# Patient Record
Sex: MALE | Race: WHITE | NOT HISPANIC OR LATINO | Employment: FULL TIME | URBAN - METROPOLITAN AREA
[De-identification: names, ages, dates, MRNs, and addresses within clinical notes are randomized per-mention and may not be internally consistent; named-entity substitution may affect disease eponyms.]

---

## 2017-01-18 ENCOUNTER — MYC MEDICAL ADVICE (OUTPATIENT)
Dept: FAMILY MEDICINE | Facility: CLINIC | Age: 37
End: 2017-01-18

## 2017-02-13 ENCOUNTER — OFFICE VISIT (OUTPATIENT)
Dept: FAMILY MEDICINE | Facility: CLINIC | Age: 37
End: 2017-02-13
Payer: COMMERCIAL

## 2017-02-13 VITALS
BODY MASS INDEX: 39.55 KG/M2 | HEIGHT: 69 IN | DIASTOLIC BLOOD PRESSURE: 118 MMHG | SYSTOLIC BLOOD PRESSURE: 174 MMHG | WEIGHT: 267 LBS | HEART RATE: 91 BPM | OXYGEN SATURATION: 98 % | TEMPERATURE: 97.8 F

## 2017-02-13 DIAGNOSIS — R39.9 URINARY SYMPTOM OR SIGN: ICD-10-CM

## 2017-02-13 DIAGNOSIS — E11.9 TYPE 2 DIABETES MELLITUS WITHOUT COMPLICATION, WITHOUT LONG-TERM CURRENT USE OF INSULIN (H): ICD-10-CM

## 2017-02-13 DIAGNOSIS — I10 ESSENTIAL HYPERTENSION WITH GOAL BLOOD PRESSURE LESS THAN 140/90: Primary | ICD-10-CM

## 2017-02-13 DIAGNOSIS — G44.209 ACUTE NON INTRACTABLE TENSION-TYPE HEADACHE: ICD-10-CM

## 2017-02-13 LAB
ALBUMIN UR-MCNC: 30 MG/DL
APPEARANCE UR: CLEAR
BILIRUB UR QL STRIP: NEGATIVE
COLOR UR AUTO: YELLOW
GLUCOSE UR STRIP-MCNC: NEGATIVE MG/DL
HBA1C MFR BLD: 6.5 % (ref 4.3–6)
HGB UR QL STRIP: NEGATIVE
KETONES UR STRIP-MCNC: NEGATIVE MG/DL
LEUKOCYTE ESTERASE UR QL STRIP: NEGATIVE
NITRATE UR QL: NEGATIVE
NON-SQ EPI CELLS #/AREA URNS LPF: NORMAL /LPF
PH UR STRIP: 6 PH (ref 5–7)
RBC #/AREA URNS AUTO: NORMAL /HPF (ref 0–2)
SP GR UR STRIP: 1.02 (ref 1–1.03)
URN SPEC COLLECT METH UR: ABNORMAL
UROBILINOGEN UR STRIP-ACNC: 0.2 EU/DL (ref 0.2–1)
WBC #/AREA URNS AUTO: NORMAL /HPF (ref 0–2)

## 2017-02-13 PROCEDURE — 93000 ELECTROCARDIOGRAM COMPLETE: CPT | Performed by: PHYSICIAN ASSISTANT

## 2017-02-13 PROCEDURE — 99214 OFFICE O/P EST MOD 30 MIN: CPT | Performed by: PHYSICIAN ASSISTANT

## 2017-02-13 PROCEDURE — 81001 URINALYSIS AUTO W/SCOPE: CPT | Performed by: PHYSICIAN ASSISTANT

## 2017-02-13 PROCEDURE — 36415 COLL VENOUS BLD VENIPUNCTURE: CPT | Performed by: PHYSICIAN ASSISTANT

## 2017-02-13 PROCEDURE — 83036 HEMOGLOBIN GLYCOSYLATED A1C: CPT | Performed by: PHYSICIAN ASSISTANT

## 2017-02-13 PROCEDURE — 82043 UR ALBUMIN QUANTITATIVE: CPT | Performed by: PHYSICIAN ASSISTANT

## 2017-02-13 RX ORDER — LISINOPRIL AND HYDROCHLOROTHIAZIDE 12.5; 2 MG/1; MG/1
1 TABLET ORAL DAILY
Qty: 90 TABLET | Refills: 0 | Status: SHIPPED | OUTPATIENT
Start: 2017-02-13 | End: 2017-04-27

## 2017-02-13 RX ORDER — METFORMIN HCL 500 MG
500 TABLET, EXTENDED RELEASE 24 HR ORAL
Qty: 90 TABLET | Refills: 0 | Status: SHIPPED | OUTPATIENT
Start: 2017-02-13 | End: 2017-04-27

## 2017-02-13 RX ORDER — PRAVASTATIN SODIUM 20 MG
20 TABLET ORAL DAILY
Qty: 90 TABLET | Refills: 0 | Status: SHIPPED | OUTPATIENT
Start: 2017-02-13 | End: 2017-04-27

## 2017-02-13 RX ORDER — AMLODIPINE BESYLATE 5 MG/1
5 TABLET ORAL DAILY
Qty: 90 TABLET | Refills: 0 | Status: SHIPPED | OUTPATIENT
Start: 2017-02-13 | End: 2017-04-27

## 2017-02-13 NOTE — NURSING NOTE
"Chief Complaint   Patient presents with     Hand Problem     Bilateral       Initial BP (!) 190/128  Pulse 91  Temp 97.8  F (36.6  C) (Oral)  Ht 5' 9\" (1.753 m)  Wt 267 lb (121.1 kg)  SpO2 98%  BMI 39.43 kg/m2 Estimated body mass index is 39.43 kg/(m^2) as calculated from the following:    Height as of this encounter: 5' 9\" (1.753 m).    Weight as of this encounter: 267 lb (121.1 kg).  Medication Reconciliation: complete   Mehrdad Blanco CMA      "

## 2017-02-13 NOTE — PATIENT INSTRUCTIONS
Make sure to come back in the next 1-2 weeks for recheck.    If things are seeming to worsen, call our nurse or think about going to the ED.

## 2017-02-13 NOTE — PROGRESS NOTES
SUBJECTIVE:                                                    Connor Emerson is a 36 year old male who presents to clinic today for the following health issues:      Hands      Duration: 2 days    Description (location/character/radiation): bilateral hands numbness    Intensity:  mild, moderate    Accompanying signs and symptoms: HA, light headed    History (similar episodes/previous evaluation): None    Precipitating or alleviating factors: None    Therapies tried and outcome: Excedrin Migraine     -Patient presents with a 2 day hx of HA, and sporadic bilateral hand numbness  -in addition to HA, there is sensation of light headedness  -no change in vision  -n/t is mostly along the palmar aspect, seems to come and go  -there is no  strength change   -there is no cough, no gross shortness of breath   -there has been limited to no chest pain; one time with inspiration yesterday, nothing since  -no jaw or left arm pain  -no diaphoresis    -he has completely stopped his medication for the past few months (he estimates around 4 months)  -also experiencing urination increase the past few weeks, especially at night    Problem list and histories reviewed & adjusted, as indicated.  Additional history: as documented    Patient Active Problem List   Diagnosis     CARDIOVASCULAR SCREENING; LDL GOAL LESS THAN 160     Low back pain     Insomnia     Obesity     HTN, goal below 140/90     Anxiety     GERD (gastroesophageal reflux disease)     Atypical chest pain     Intractable chronic migraine without aura     Type 2 diabetes mellitus without complication (H)     Adjustment disorder with mixed anxiety and depressed mood     Lateral epicondylitis of right elbow     Past Surgical History   Procedure Laterality Date     Inject block medial branch cervical/thoracic/lumbar       Orthopedic surgery       Ganesh. Rotator cuff repair.       Social History   Substance Use Topics     Smoking status: Never Smoker     Smokeless tobacco:  "Never Used     Alcohol use 0.0 oz/week     0 Standard drinks or equivalent per week      Comment: social     Family History   Problem Relation Age of Onset     CANCER Mother      uterine     CANCER Maternal Uncle      melanoma     Family History Negative Father            ROS:  C: NEGATIVE for fever, chills, change in weight  E/M: NEGATIVE for ear, mouth and throat problems  R: NEGATIVE for significant cough or SOB  CV: NEGATIVE for chest pain, palpitations or peripheral edema  MUSCULOSKELETAL: POSITIVE  for periodic n/t into the hand b/l  NEURO: POSITIVE for HA, light headedness    OBJECTIVE:                                                    BP (!) 174/118  Pulse 91  Temp 97.8  F (36.6  C) (Oral)  Ht 5' 9\" (1.753 m)  Wt 267 lb (121.1 kg)  SpO2 98%  BMI 39.43 kg/m2  Body mass index is 39.43 kg/(m^2).  GENERAL: healthy, alert and no distress  EYES: Eyes grossly normal to inspection, PERRL and conjunctivae and sclerae normal  HENT: ear canals and TM's normal, nose and mouth without ulcers or lesions  NECK: no carotid bruits  RESP: lungs clear to auscultation - no rales, rhonchi or wheezes  CV: regular rates and rhythm, normal S1 S2, no S3 or S4 and no murmur, click or rub  MS: no gross musculoskeletal defects noted, no edema  NEURO: Normal strength and tone, mentation intact, oriented times 3, speech normal, cranial nerves 2-12 intact, DTR's normal and symmetric, Romberg normal and rapid alternating movements normal    Diagnostic Test Results:  See A/P     ASSESSMENT/PLAN:                                                    1. Essential hypertension with goal blood pressure less than 140/90  2. Acute non intractable tension-type headache  BP uncontrolled. I expect this is the cause for his HA today though he does have hx of migraine as well. No evidence for ischemia on EKG. We will restart his medication today and he will plan to follow up closely with his primary care provider within the next week. He will follow up " more urgently with increasing shortness of breath or chest pain.  - EKG 12-lead complete w/read - Clinics  - amLODIPine (NORVASC) 5 MG tablet; Take 1 tablet (5 mg) by mouth daily  Dispense: 90 tablet; Refill: 0  - lisinopril-hydrochlorothiazide (PRINZIDE/ZESTORETIC) 20-12.5 MG per tablet; Take 1 tablet by mouth daily  Dispense: 90 tablet; Refill: 0  - Urine Microscopic      3. Type 2 diabetes mellitus without complication, without long-term current use of insulin (H)  Completely off of medication as above. Will update his labs today and he plans to follow up with his primary care provider. He was concerned with restarting metformin so we discussed side effects once again and a plan for slow increase.   - Hemoglobin A1c  - Albumin Random Urine Quantitative  - *UA reflex to Microscopic and Culture ([facility] (except [place] and [place])  - metFORMIN (GLUCOPHAGE-XR) 500 MG 24 hr tablet; Take 1 tablet (500 mg) by mouth daily (with dinner)  Dispense: 90 tablet; Refill: 0  - pravastatin (PRAVACHOL) 20 MG tablet; Take 1 tablet (20 mg) by mouth daily  Dispense: 90 tablet; Refill: 0    4. Urinary symptom or sign  Will screen urine today given new changes.  - *UA reflex to Microscopic and Culture ([facility] (except [place] and [place])    Jayden King PA-C  [facility]

## 2017-02-13 NOTE — MR AVS SNAPSHOT
After Visit Summary   2/13/2017    Connor Emerson    MRN: [id number]           Patient Information     Date Of Birth          1980        Visit Information        Provider Department      2/13/2017 10:00 AM Jayden King PA-C [facility]        Today's Diagnoses     Essential hypertension with goal blood pressure less than 140/90    -  1    Acute non intractable tension-type headache        Type 2 diabetes mellitus without complication, without long-term current use of insulin (H)        Urinary symptom or sign        Benign essential hypertension          Care Instructions    Make sure to come back in the next 1-2 weeks for recheck.    If things are seeming to worsen, call our nurse or think about going to the ED.        Follow-ups after your visit        Who to contact     If you have questions or need follow up information about today's clinic visit or your schedule please contact [facility] directly at [phone].  Normal or non-critical lab and imaging results will be communicated to you by [employer], letter or phone within 4 business days after the clinic has received the results. If you do not hear from us within 7 days, please contact the clinic through [employer] or phone. If you have a critical or abnormal lab result, we will notify you by phone as soon as possible.  Submit refill requests through [employer] or call your pharmacy and they will forward the refill request to us. Please allow 3 business days for your refill to be completed.          Additional Information About Your Visit        MyChart Information     [employer] gives you secure access to your electronic health record. If you see a primary care provider, you can also send messages to your care team and make appointments. If you have questions, please call your primary care clinic.  If you do not have a primary care provider, please call [phone] and they will assist you.        Care  "EveryWhere ID     This is your Care EveryWhere ID. This could be used by other organizations to access your [place] medical records  [id number]        Your Vitals Were     Pulse Temperature Height Pulse Oximetry BMI (Body Mass Index)       91 97.8  F (36.6  C) (Oral) 5' 9\" (1.753 m) 98% 39.43 kg/m2        Blood Pressure from Last 3 Encounters:   02/13/17 (!) 190/128   11/11/16 (!) 172/120   08/30/16 (!) 158/100    Weight from Last 3 Encounters:   02/13/17 267 lb (121.1 kg)   11/11/16 258 lb (117 kg)   08/30/16 245 lb 14.4 oz (111.5 kg)              We Performed the Following     *UA reflex to Microscopic and Culture ([facility] [place] (except [place] and [place])     Albumin Random Urine Quantitative     EKG 12-lead complete w/read - Clinics     Hemoglobin A1c          Where to get your medicines      These medications were sent to [facility] [address] [address]  [address] [facility] [address]     Phone:  [phone]     amLODIPine 5 MG tablet    lisinopril-hydrochlorothiazide 20-12.5 MG per tablet    metFORMIN 500 MG 24 hr tablet    pravastatin 20 MG tablet          Primary Care Provider Office Phone # Fax #    Lizandro Hughes [phone]       [facility] [address] [facility] [address]        Thank you!     Thank you for choosing [facility]  for your care. Our goal is always to provide you with excellent care. Hearing back from our patients is one way we can continue to improve our services. Please take a few minutes to complete the written survey that you may receive in the mail after your visit with us. Thank you!             Your Updated Medication List - Protect others around you: Learn how to safely use, store and throw away your medicines at www.disposemymeds.org.          This list is accurate as of: 2/13/17 10:51 AM.  Always use your most recent med list.                   Brand Name " Dispense Instructions for use    amLODIPine 5 MG tablet    NORVASC    90 tablet    Take 1 tablet (5 mg) by mouth daily       ASPIRIN NOT PRESCRIBED    INTENTIONAL     Reported on 2/13/2017       blood glucose monitoring lancets     1 Box    Use to test blood sugar 2 times daily or as directed.  Ok to substitute alternative if insurance prefers.       blood glucose monitoring meter device kit     1 kit    Use to test blood sugar 2 times daily or as directed.  Ok to substitute alternative if insurance prefers.       blood glucose monitoring test strip    ANTHONY CONTOUR NEXT    150 each    Use to test blood sugar 2 times daily or as directed.  Ok to substitute alternative if insurance prefers.       cholecalciferol 5000 UNITS Caps capsule    vitamin D3    30 capsule    Take 1 capsule (5,000 Units) by mouth daily       cyanocobalamin 1000 MCG Subl sublingual tablet     30 tablet    Place 1 tablet (1,000 mcg) under the tongue daily       EPINEPHrine 0.3 MG/0.3ML injection     1 each    Inject 0.3 mLs (0.3 mg) into the muscle once as needed for anaphylaxis       GLUCOSE MANAGEMENT Tabs     100 tablet    Take 3-4 tablets by mouth daily as needed       lisinopril-hydrochlorothiazide 20-12.5 MG per tablet    PRINZIDE/ZESTORETIC    90 tablet    Take 1 tablet by mouth daily       metFORMIN 500 MG 24 hr tablet    GLUCOPHAGE-XR    90 tablet    Take 1 tablet (500 mg) by mouth daily (with dinner)       pravastatin 20 MG tablet    PRAVACHOL    90 tablet    Take 1 tablet (20 mg) by mouth daily

## 2017-02-14 LAB
CREAT UR-MCNC: 156 MG/DL
MICROALBUMIN UR-MCNC: 154 MG/L
MICROALBUMIN/CREAT UR: 98.72 MG/G CR (ref 0–17)

## 2017-04-06 ENCOUNTER — TELEPHONE (OUTPATIENT)
Dept: FAMILY MEDICINE | Facility: CLINIC | Age: 37
End: 2017-04-06

## 2017-04-06 NOTE — LETTER
[facility]  [address] [facility] [address] [address]  Phone: [phone]  April 6, 2017      Connor Emerson  [address]  [place] [address]      Dear Connor,    We care about your health and have reviewed your health plan including your medical conditions, medications, and lab results.  Based on this review, it is recommended that you follow up regarding the following health topic(s):  -Diabetes    We recommend you take the following action(s):  -schedule a FOLLOWUP APPOINTMENT.     Please call us at the [place] (or use "[employer] to address the above recommendations.     Thank you for trusting [facility] and we appreciate the opportunity to serve you.  We look forward to supporting your healthcare needs in the future.    Healthy Regards,    Your Health Care Team/Radha SHARMA  [facility]

## 2017-04-06 NOTE — TELEPHONE ENCOUNTER
Panel Management Review      Patient has the following on his problem list:     Diabetes    ASA: Passed    Last A1C  Lab Results   Component Value Date    A1C 6.5 02/13/2017    A1C 6.4 11/11/2016    A1C 7.2 03/14/2016    A1C 8.6 09/29/2015    A1C 6.0 10/03/2013     A1C tested: Passed    Last LDL:    Lab Results   Component Value Date    CHOL 183 11/11/2016     Lab Results   Component Value Date    HDL 38 11/11/2016     Lab Results   Component Value Date     11/11/2016     Lab Results   Component Value Date    TRIG 176 11/11/2016     Lab Results   Component Value Date    CHOLHDLRATIO 5.6 09/29/2015     Lab Results   Component Value Date    NHDL 145 11/11/2016       Is the patient on a Statin? YES             Is the patient on Aspirin? NO    Medications     HMG CoA Reductase Inhibitors    pravastatin (PRAVACHOL) 20 MG tablet          Last three blood pressure readings:  BP Readings from Last 3 Encounters:   02/13/17 (!) 174/118   11/11/16 (!) 172/120   08/30/16 (!) 158/100       Date of last diabetes office visit: 10/27/15, had bloodwork last month     Tobacco History:     History   Smoking Status     Never Smoker   Smokeless Tobacco     Never Used           Composite cancer screening  Chart review shows that this patient is due/due soon for the following None  Summary:    Patient is due/failing the following:   None    Action needed:   Patient needs office visit for Follow up at some point for diabetes, blood work current. Discussed at last visit 2/13/17.    Type of outreach:NoneNone    Questions for provider review:    None                                                                                                                                    Amanda MARQUEZ M.A.       Chart routed to Care Team .

## 2017-04-26 ENCOUNTER — OFFICE VISIT (OUTPATIENT)
Dept: URGENT CARE | Facility: URGENT CARE | Age: 37
End: 2017-04-26

## 2017-04-26 VITALS
RESPIRATION RATE: 18 BRPM | BODY MASS INDEX: 39.43 KG/M2 | HEART RATE: 84 BPM | TEMPERATURE: 98.3 F | WEIGHT: 267 LBS | SYSTOLIC BLOOD PRESSURE: 132 MMHG | DIASTOLIC BLOOD PRESSURE: 78 MMHG | OXYGEN SATURATION: 97 %

## 2017-04-26 DIAGNOSIS — S61.451A CAT BITE OF HAND, RIGHT, INITIAL ENCOUNTER: Primary | ICD-10-CM
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
## 2017-04-26 PROCEDURE — 99213 OFFICE O/P EST LOW 20 MIN: CPT | Performed by: FAMILY MEDICINE

## 2017-04-26 NOTE — NURSING NOTE
"Chief Complaint   Patient presents with     Urgent Care     Cat Bite       Initial /78  Pulse 84  Temp 98.3  F (36.8  C) (Oral)  Resp 18  Wt 267 lb (121.1 kg)  SpO2 97%  BMI 39.43 kg/m2 Estimated body mass index is 39.43 kg/(m^2) as calculated from the following:    Height as of 2/13/17: 5' 9\" (1.753 m).    Weight as of this encounter: 267 lb (121.1 kg).  Medication Reconciliation: complete       Donna Hull CMA      "

## 2017-04-26 NOTE — PROGRESS NOTES
SUBJECTIVE:                                                    Connor Emerson is a 36 year old male who presents to clinic today for the following health issues:      Cat bite this afternoon. Cat up to date on shots. Apartment was dirty concerned for that.  Bit on the top of his hand he cleaned it out substantially but as the day went on it seemed to get redder and a little bit more painful.    OBJECTIVE:  /78  Pulse 84  Temp 98.3  F (36.8  C) (Oral)  Resp 18  Wt 267 lb (121.1 kg)  SpO2 97%  BMI 39.43 kg/m2  Exam; general in no apparent distress    Heart and lungs clear    Skin; tthe affected hand on the dorsum is red there are bite marks that appear to be a puncture type of wounds. Decreased range of motion secondary to pain. No evidence of lymphangitis    ASSESSMENT:  1. Cat bite; does appear infected    PLAN:  1. see above medications ; augmentin for 10 days  2. return to her primary care this week for reevaluation

## 2017-04-26 NOTE — MR AVS SNAPSHOT
After Visit Summary   4/26/2017    Connor Emerson    MRN: [id number]           Patient Information     Date Of Birth          1980        Visit Information        Provider Department      4/26/2017 5:55 PM Black Nelson MD [facility]        Today's Diagnoses     Cat bite of hand, right, initial encounter    -  1       Follow-ups after your visit        Who to contact     If you have questions or need follow up information about today's clinic visit or your schedule please contact [facility] directly at [phone].  Normal or non-critical lab and imaging results will be communicated to you by [employer], letter or phone within 4 business days after the clinic has received the results. If you do not hear from us within 7 days, please contact the clinic through [employer] or phone. If you have a critical or abnormal lab result, we will notify you by phone as soon as possible.  Submit refill requests through [employer] or call your pharmacy and they will forward the refill request to us. Please allow 3 business days for your refill to be completed.          Additional Information About Your Visit        MyChart Information     [employer] gives you secure access to your electronic health record. If you see a primary care provider, you can also send messages to your care team and make appointments. If you have questions, please call your primary care clinic.  If you do not have a primary care provider, please call [phone] and they will assist you.        Care EveryWhere ID     This is your Care EveryWhere ID. This could be used by other organizations to access your [place] medical records  [id number]        Your Vitals Were     Pulse Temperature Respirations Pulse Oximetry BMI (Body Mass Index)       84 98.3  F (36.8  C) (Oral) 18 97% 39.43 kg/m2        Blood Pressure from Last 3 Encounters:   04/27/17 (!) 151/103   04/27/17 (!) 171/118   04/26/17 132/78    Weight  from Last 3 Encounters:   04/27/17 258 lb 14.4 oz (117.4 kg)   04/27/17 258 lb (117 kg)   04/26/17 267 lb (121.1 kg)              Today, you had the following     No orders found for display         Today's Medication Changes          These changes are accurate as of: 4/26/17 11:59 PM.  If you have any questions, ask your nurse or doctor.               Start taking these medicines.        Dose/Directions    amoxicillin-clavulanate 875-125 MG per tablet   Commonly known as:  AUGMENTIN   Used for:  Cat bite of hand, right, initial encounter   Started by:  Black Nelson MD        Dose:  1 tablet   Take 1 tablet by mouth 2 times daily   Quantity:  20 tablet   Refills:  0            Where to get your medicines      These medications were sent to [facility] [address] AT [address] [address]    Hours:  24-hours Phone:  [phone]     amoxicillin-clavulanate 875-125 MG per tablet                Primary Care Provider Office Phone # Fax #    Lizandro Hughes [phone]       [facility] [address] [place] [address]        Thank you!     Thank you for choosing [facility]  for your care. Our goal is always to provide you with excellent care. Hearing back from our patients is one way we can continue to improve our services. Please take a few minutes to complete the written survey that you may receive in the mail after your visit with us. Thank you!             Your Updated Medication List - Protect others around you: Learn how to safely use, store and throw away your medicines at www.disposemymeds.org.          This list is accurate as of: 4/26/17 11:59 PM.  Always use your most recent med list.                   Brand Name Dispense Instructions for use    amoxicillin-clavulanate 875-125 MG per tablet    AUGMENTIN    20 tablet    Take 1 tablet by mouth 2 times daily        ASPIRIN NOT PRESCRIBED    INTENTIONAL     Reported on 2/13/2017       EPINEPHrine 0.3 MG/0.3ML injection     1 each    Inject 0.3 mLs (0.3 mg) into the muscle once as needed for anaphylaxis

## 2017-04-27 ENCOUNTER — TELEPHONE (OUTPATIENT)
Dept: FAMILY MEDICINE | Facility: CLINIC | Age: 37
End: 2017-04-27

## 2017-04-27 ENCOUNTER — HOSPITAL ENCOUNTER (INPATIENT)
Facility: CLINIC | Age: 37
LOS: 1 days | Discharge: HOME | DRG: 603 | End: 2017-04-27
Attending: EMERGENCY MEDICINE | Admitting: HOSPITALIST
Payer: OTHER MISCELLANEOUS

## 2017-04-27 ENCOUNTER — APPOINTMENT (OUTPATIENT)
Dept: GENERAL RADIOLOGY | Facility: CLINIC | Age: 37
DRG: 603 | End: 2017-04-27
Attending: EMERGENCY MEDICINE
Payer: OTHER MISCELLANEOUS

## 2017-04-27 ENCOUNTER — OFFICE VISIT (OUTPATIENT)
Dept: FAMILY MEDICINE | Facility: CLINIC | Age: 37
End: 2017-04-27
Payer: COMMERCIAL

## 2017-04-27 VITALS
RESPIRATION RATE: 16 BRPM | WEIGHT: 258.9 LBS | BODY MASS INDEX: 40.63 KG/M2 | HEIGHT: 67 IN | SYSTOLIC BLOOD PRESSURE: 151 MMHG | DIASTOLIC BLOOD PRESSURE: 103 MMHG | HEART RATE: 77 BPM | TEMPERATURE: 98.9 F | OXYGEN SATURATION: 96 %

## 2017-04-27 VITALS
DIASTOLIC BLOOD PRESSURE: 118 MMHG | WEIGHT: 258 LBS | SYSTOLIC BLOOD PRESSURE: 171 MMHG | RESPIRATION RATE: 18 BRPM | HEART RATE: 72 BPM | TEMPERATURE: 98.2 F | BODY MASS INDEX: 38.1 KG/M2
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
## 2017-04-27 DIAGNOSIS — S61.451A CAT BITE OF HAND, RIGHT, INITIAL ENCOUNTER: ICD-10-CM
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
## 2017-04-27 DIAGNOSIS — L03.113 CELLULITIS OF RIGHT UPPER EXTREMITY: ICD-10-CM

## 2017-04-27 DIAGNOSIS — S61.451A CAT BITE OF HAND, RIGHT, INITIAL ENCOUNTER: Primary | ICD-10-CM

## 2017-04-27 LAB
ANION GAP SERPL CALCULATED.3IONS-SCNC: 4 MMOL/L (ref 3–14)
BUN SERPL-MCNC: 13 MG/DL (ref 7–30)
CALCIUM SERPL-MCNC: 8.7 MG/DL (ref 8.5–10.1)
CHLORIDE SERPL-SCNC: 108 MMOL/L (ref 94–109)
CO2 SERPL-SCNC: 30 MMOL/L (ref 20–32)
CREAT SERPL-MCNC: 0.69 MG/DL (ref 0.66–1.25)
CRP SERPL-MCNC: 9.3 MG/L (ref 0–8)
ERYTHROCYTE [DISTWIDTH] IN BLOOD BY AUTOMATED COUNT: 12.9 % (ref 10–15)
GFR SERPL CREATININE-BSD FRML MDRD: ABNORMAL ML/MIN/1.7M2
GLUCOSE BLDC GLUCOMTR-MCNC: 117 MG/DL (ref 70–99)
GLUCOSE BLDC GLUCOMTR-MCNC: 119 MG/DL (ref 70–99)
GLUCOSE SERPL-MCNC: 133 MG/DL (ref 70–99)
HCT VFR BLD AUTO: 43.8 % (ref 40–53)
HGB BLD-MCNC: 15.2 G/DL (ref 13.3–17.7)
MCH RBC QN AUTO: 31.1 PG (ref 26.5–33)
MCHC RBC AUTO-ENTMCNC: 34.7 G/DL (ref 31.5–36.5)
MCV RBC AUTO: 90 FL (ref 78–100)
PLATELET # BLD AUTO: 187 10E9/L (ref 150–450)
POTASSIUM SERPL-SCNC: 3.9 MMOL/L (ref 3.4–5.3)
RBC # BLD AUTO: 4.88 10E12/L (ref 4.4–5.9)
SODIUM SERPL-SCNC: 142 MMOL/L (ref 133–144)
WBC # BLD AUTO: 9.3 10E9/L (ref 4–11)

## 2017-04-27 PROCEDURE — 00000146 ZZHCL STATISTIC GLUCOSE BY METER IP

## 2017-04-27 PROCEDURE — 73130 X-RAY EXAM OF HAND: CPT | Mod: RT

## 2017-04-27 PROCEDURE — 25000132 ZZH RX MED GY IP 250 OP 250 PS 637: Performed by: PHYSICIAN ASSISTANT

## 2017-04-27 PROCEDURE — 25000132 ZZH RX MED GY IP 250 OP 250 PS 637: Performed by: EMERGENCY MEDICINE

## 2017-04-27 PROCEDURE — 25000125 ZZHC RX 250: Performed by: PHYSICIAN ASSISTANT

## 2017-04-27 PROCEDURE — 86140 C-REACTIVE PROTEIN: CPT | Performed by: EMERGENCY MEDICINE

## 2017-04-27 PROCEDURE — 99207 ZZC OFFICE-HOSPITAL ADMIT: CPT | Performed by: PHYSICIAN ASSISTANT

## 2017-04-27 PROCEDURE — 85027 COMPLETE CBC AUTOMATED: CPT | Performed by: EMERGENCY MEDICINE

## 2017-04-27 PROCEDURE — 25000128 H RX IP 250 OP 636: Performed by: PHYSICIAN ASSISTANT

## 2017-04-27 PROCEDURE — 25000125 ZZHC RX 250: Performed by: EMERGENCY MEDICINE

## 2017-04-27 PROCEDURE — 12000000 ZZH R&B MED SURG/OB

## 2017-04-27 PROCEDURE — 99222 1ST HOSP IP/OBS MODERATE 55: CPT | Performed by: HOSPITALIST

## 2017-04-27 PROCEDURE — 99285 EMERGENCY DEPT VISIT HI MDM: CPT | Mod: 25

## 2017-04-27 PROCEDURE — 87040 BLOOD CULTURE FOR BACTERIA: CPT | Performed by: PHYSICIAN ASSISTANT

## 2017-04-27 PROCEDURE — 80048 BASIC METABOLIC PNL TOTAL CA: CPT | Performed by: EMERGENCY MEDICINE

## 2017-04-27 PROCEDURE — 36415 COLL VENOUS BLD VENIPUNCTURE: CPT | Performed by: PHYSICIAN ASSISTANT

## 2017-04-27 PROCEDURE — 96365 THER/PROPH/DIAG IV INF INIT: CPT

## 2017-04-27 RX ORDER — ACETAMINOPHEN 500 MG
1000 TABLET ORAL EVERY 4 HOURS PRN
Status: DISCONTINUED | OUTPATIENT
Start: 2017-04-27 | End: 2017-04-27

## 2017-04-27 RX ORDER — AMPICILLIN AND SULBACTAM 2; 1 G/1; G/1
3 INJECTION, POWDER, FOR SOLUTION INTRAMUSCULAR; INTRAVENOUS ONCE
Status: COMPLETED | OUTPATIENT
Start: 2017-04-27 | End: 2017-04-27

## 2017-04-27 RX ORDER — SODIUM CHLORIDE 9 MG/ML
INJECTION, SOLUTION INTRAVENOUS CONTINUOUS
Status: DISCONTINUED | OUTPATIENT
Start: 2017-04-27 | End: 2017-04-27 | Stop reason: HOSPADM

## 2017-04-27 RX ORDER — HYDRALAZINE HYDROCHLORIDE 20 MG/ML
10 INJECTION INTRAMUSCULAR; INTRAVENOUS EVERY 6 HOURS PRN
Status: DISCONTINUED | OUTPATIENT
Start: 2017-04-27 | End: 2017-04-27 | Stop reason: HOSPADM

## 2017-04-27 RX ORDER — NICOTINE POLACRILEX 4 MG
15-30 LOZENGE BUCCAL
Status: DISCONTINUED | OUTPATIENT
Start: 2017-04-27 | End: 2017-04-27 | Stop reason: HOSPADM

## 2017-04-27 RX ORDER — IBUPROFEN 600 MG/1
600 TABLET, FILM COATED ORAL EVERY 6 HOURS PRN
Status: DISCONTINUED | OUTPATIENT
Start: 2017-04-27 | End: 2017-04-27 | Stop reason: HOSPADM

## 2017-04-27 RX ORDER — NALOXONE HYDROCHLORIDE 0.4 MG/ML
.1-.4 INJECTION, SOLUTION INTRAMUSCULAR; INTRAVENOUS; SUBCUTANEOUS
Status: DISCONTINUED | OUTPATIENT
Start: 2017-04-27 | End: 2017-04-27 | Stop reason: HOSPADM

## 2017-04-27 RX ORDER — AMPICILLIN AND SULBACTAM 2; 1 G/1; G/1
3 INJECTION, POWDER, FOR SOLUTION INTRAMUSCULAR; INTRAVENOUS EVERY 6 HOURS
Status: DISCONTINUED | OUTPATIENT
Start: 2017-04-27 | End: 2017-04-27 | Stop reason: HOSPADM

## 2017-04-27 RX ORDER — DEXTROSE MONOHYDRATE 25 G/50ML
25-50 INJECTION, SOLUTION INTRAVENOUS
Status: DISCONTINUED | OUTPATIENT
Start: 2017-04-27 | End: 2017-04-27 | Stop reason: HOSPADM

## 2017-04-27 RX ORDER — ONDANSETRON 4 MG/1
4 TABLET, ORALLY DISINTEGRATING ORAL EVERY 6 HOURS PRN
Status: DISCONTINUED | OUTPATIENT
Start: 2017-04-27 | End: 2017-04-27 | Stop reason: HOSPADM

## 2017-04-27 RX ORDER — AMOXICILLIN 250 MG
1-2 CAPSULE ORAL 2 TIMES DAILY PRN
Status: DISCONTINUED | OUTPATIENT
Start: 2017-04-27 | End: 2017-04-27 | Stop reason: HOSPADM

## 2017-04-27 RX ORDER — METFORMIN HCL 500 MG
500 TABLET, EXTENDED RELEASE 24 HR ORAL
Status: DISCONTINUED | OUTPATIENT
Start: 2017-04-27 | End: 2017-04-27 | Stop reason: HOSPADM

## 2017-04-27 RX ORDER — LISINOPRIL 20 MG/1
20 TABLET ORAL DAILY
Status: DISCONTINUED | OUTPATIENT
Start: 2017-04-27 | End: 2017-04-27 | Stop reason: HOSPADM

## 2017-04-27 RX ORDER — PRAVASTATIN SODIUM 20 MG
20 TABLET ORAL EVERY EVENING
Status: DISCONTINUED | OUTPATIENT
Start: 2017-04-27 | End: 2017-04-27 | Stop reason: HOSPADM

## 2017-04-27 RX ORDER — ONDANSETRON 2 MG/ML
4 INJECTION INTRAMUSCULAR; INTRAVENOUS EVERY 6 HOURS PRN
Status: DISCONTINUED | OUTPATIENT
Start: 2017-04-27 | End: 2017-04-27 | Stop reason: HOSPADM

## 2017-04-27 RX ORDER — ACETAMINOPHEN 325 MG/1
650 TABLET ORAL EVERY 4 HOURS PRN
Status: DISCONTINUED | OUTPATIENT
Start: 2017-04-27 | End: 2017-04-27 | Stop reason: HOSPADM

## 2017-04-27 RX ORDER — LIDOCAINE 40 MG/G
CREAM TOPICAL
Status: DISCONTINUED | OUTPATIENT
Start: 2017-04-27 | End: 2017-04-27 | Stop reason: HOSPADM

## 2017-04-27 RX ADMIN — METFORMIN HYDROCHLORIDE 500 MG: 500 TABLET, EXTENDED RELEASE ORAL at 17:23

## 2017-04-27 RX ADMIN — SODIUM CHLORIDE: 9 INJECTION, SOLUTION INTRAVENOUS at 13:36

## 2017-04-27 RX ADMIN — ACETAMINOPHEN 1000 MG: 500 TABLET, FILM COATED ORAL at 12:12

## 2017-04-27 RX ADMIN — AMPICILLIN SODIUM AND SULBACTAM SODIUM 3 G: 2; 1 INJECTION, POWDER, FOR SOLUTION INTRAMUSCULAR; INTRAVENOUS at 17:23

## 2017-04-27 RX ADMIN — AMPICILLIN SODIUM AND SULBACTAM SODIUM 3 G: 2; 1 INJECTION, POWDER, FOR SOLUTION INTRAMUSCULAR; INTRAVENOUS at 11:09

## 2017-04-27 RX ADMIN — LISINOPRIL 20 MG: 20 TABLET ORAL at 14:56

## 2017-04-27 ASSESSMENT — PAIN DESCRIPTION - DESCRIPTORS: DESCRIPTORS: ACHING

## 2017-04-27 ASSESSMENT — ENCOUNTER SYMPTOMS
WOUND: 1
FEVER: 0

## 2017-04-27 NOTE — MR AVS SNAPSHOT
After Visit Summary   4/27/2017    Connor Emerson    MRN: [id number]           Patient Information     Date Of Birth          1980        Visit Information        Provider Department      4/27/2017 8:00 AM Denis Byrd PA-C [facility]        Today's Diagnoses     Cat bite of hand, right, initial encounter    -  1       Follow-ups after your visit        Who to contact     If you have questions or need follow up information about today's clinic visit or your schedule please contact [facility] directly at [phone].  Normal or non-critical lab and imaging results will be communicated to you by [employer], letter or phone within 4 business days after the clinic has received the results. If you do not hear from us within 7 days, please contact the clinic through [employer] or phone. If you have a critical or abnormal lab result, we will notify you by phone as soon as possible.  Submit refill requests through [employer] or call your pharmacy and they will forward the refill request to us. Please allow 3 business days for your refill to be completed.          Additional Information About Your Visit        MyChart Information     [employer] gives you secure access to your electronic health record. If you see a primary care provider, you can also send messages to your care team and make appointments. If you have questions, please call your primary care clinic.  If you do not have a primary care provider, please call [phone] and they will assist you.        Care EveryWhere ID     This is your Care EveryWhere ID. This could be used by other organizations to access your [place] medical records  [id number]        Your Vitals Were     Pulse Temperature Respirations BMI (Body Mass Index)          72 98.2  F (36.8  C) (Oral) 18 38.1 kg/m2         Blood Pressure from Last 3 Encounters:   04/27/17 (!) 171/118   04/26/17 132/78   02/13/17 (!) 174/118    Weight from Last  3 Encounters:   04/27/17 258 lb (117 kg)   04/26/17 267 lb (121.1 kg)   02/13/17 267 lb (121.1 kg)              Today, you had the following     No orders found for display       Primary Care Provider Office Phone # Fax #    Lizandro Hughes [phone]       [facility] [address] [facility] [address] [address]        Thank you!     Thank you for choosing [facility]  for your care. Our goal is always to provide you with excellent care. Hearing back from our patients is one way we can continue to improve our services. Please take a few minutes to complete the written survey that you may receive in the mail after your visit with us. Thank you!             Your Updated Medication List - Protect others around you: Learn how to safely use, store and throw away your medicines at www.disposemymeds.org.          This list is accurate as of: 4/27/17  8:47 AM.  Always use your most recent med list.                   Brand Name Dispense Instructions for use    amoxicillin-clavulanate 875-125 MG per tablet    AUGMENTIN    20 tablet    Take 1 tablet by mouth 2 times daily       ASPIRIN NOT PRESCRIBED    INTENTIONAL     Reported on 2/13/2017       EPINEPHrine 0.3 MG/0.3ML injection     1 each    Inject 0.3 mLs (0.3 mg) into the muscle once as needed for anaphylaxis

## 2017-04-27 NOTE — NURSING NOTE
"Chief Complaint   Patient presents with     Cat Bite       Initial BP (!) 171/118 (BP Location: Right arm, Patient Position: Chair, Cuff Size: Adult Large)  Pulse 72  Temp 98.2  F (36.8  C) (Oral)  Resp 18  Wt 258 lb (117 kg)  BMI 38.1 kg/m2 Estimated body mass index is 38.1 kg/(m^2) as calculated from the following:    Height as of 2/13/17: 5' 9\" (1.753 m).    Weight as of this encounter: 258 lb (117 kg).  Medication Reconciliation: complete    "

## 2017-04-27 NOTE — PHARMACY-ADMISSION MEDICATION HISTORY
"Admission medication history interview status for this patient is complete. See [facility] admission navigator for allergy information, prior to admission medications and immunization status.     Medication history interview source(s):Patient  Medication history resources (including written lists, pill bottles, clinic record):[facility] clinic list  Primary pharmacy:[facility]    Changes made to PTA medication list:  Added: ---  Deleted: ---  Changed: ---    Actions taken by pharmacist (provider contacted, etc):None     Additional medication history information:pt states he is supposed to be on a vitamin for DM, metformin, couple BP meds and a diuretic.  Stopped taking all this meds a couple months ago, \"just being lazy.\"  Finances not an issue for taking meds per pt.    Medication reconciliation/reorder completed by provider prior to medication history? No      Prior to Admission medications    Medication Sig Last Dose Taking? Auth Provider   amoxicillin-clavulanate (AUGMENTIN) 875-125 MG per tablet Take 1 tablet by mouth 2 times daily 4/27/2017 at am Yes Black Nelson MD   EPINEPHrine (EPIPEN) 0.3 MG/0.3ML injection Inject 0.3 mLs (0.3 mg) into the muscle once as needed for anaphylaxis no recent usage  Radha Shetty Ra, APRN CNP         "

## 2017-04-27 NOTE — PROGRESS NOTES
SUBJECTIVE:                                                    Connor Emerson is a 36 year old male who presents to clinic today for the following health issues:      ED/UC Followup:    Facility:  [facility]-note not yet finished for review.   Date of visit: 4/26/2017  Reason for visit: cat bite-was clearing out tenants apartment yesterday, when a cat bit his left hand. Pain and swelling worsened throughout the day, which prompted him to see UC at ~5 PM last evening. Augmentin was started, but symptoms continued to worsen today with worsening swelling, redness, and warmth. No noted streaking or fever or chills.     Current Status: sx worsened-swelling, went to work this am-needing dr note stating ok to work if so any restrictions     -patient stopped all meds approx 5 months ago. A1C in feb was 6.5. Was restarted on BP medications, but has failed to start taking these. States this is due to being lazy.     Problem list and histories reviewed & adjusted, as indicated.  Additional history: as documented    Patient Active Problem List   Diagnosis     CARDIOVASCULAR SCREENING; LDL GOAL LESS THAN 160     Low back pain     Insomnia     Obesity     HTN, goal below 140/90     Anxiety     GERD (gastroesophageal reflux disease)     Atypical chest pain     Intractable chronic migraine without aura     Type 2 diabetes mellitus without complication (H)     Adjustment disorder with mixed anxiety and depressed mood     Lateral epicondylitis of right elbow     Past Surgical History:   Procedure Laterality Date     INJECT BLOCK MEDIAL BRANCH CERVICAL/THORACIC/LUMBAR       ORTHOPEDIC SURGERY      Ganesh. Rotator cuff repair.       Social History   Substance Use Topics     Smoking status: Never Smoker     Smokeless tobacco: Never Used     Alcohol use 0.0 oz/week     0 Standard drinks or equivalent per week      Comment: social     Family History   Problem Relation Age of Onset     CANCER Mother      uterine     Family History Negative  Father      CANCER Maternal Uncle      melanoma         Current Outpatient Prescriptions   Medication Sig Dispense Refill     amoxicillin-clavulanate (AUGMENTIN) 875-125 MG per tablet Take 1 tablet by mouth 2 times daily 20 tablet 0     EPINEPHrine (EPIPEN) 0.3 MG/0.3ML injection Inject 0.3 mLs (0.3 mg) into the muscle once as needed for anaphylaxis 1 each 0     ASPIRIN NOT PRESCRIBED, INTENTIONAL, Reported on 2/13/2017       Allergies   Allergen Reactions     Vicodin [Hydrocodone-Acetaminophen] Nausea and Vomiting and GI Disturbance     Recent Labs   Lab Test  02/13/17   1052  11/11/16   1051  08/20/16   1950  06/01/16   1417  03/14/16   1359  09/29/15   1604   07/14/14   1437   A1C  6.5*  6.4*   --    --   7.2*  8.6*   --    --    LDL   --   110*   --    --    --   110   --   113   HDL   --   38*   --    --    --   33*   --   34*   TRIG   --   176*   --    --    --   204*   --   105   ALT   --   39   --   37   --   66   < >  51   CR   --   0.66  0.71  0.74  0.73  0.96   < >  0.73   GFRESTIMATED   --   >90  Non  GFR Calc    >90  Non  GFR Calc    >90  Non  GFR Calc    >90  Non  GFR Calc    88   < >  >90   GFRESTBLACK   --   >90   GFR Calc    >90   GFR Calc    >90   GFR Calc    >90   GFR Calc    >90   GFR Calc     < >  >90   POTASSIUM   --   4.0  3.7  4.1  3.3*  4.5   < >  4.2   TSH   --   1.72   --   1.38  1.34   --    --    --     < > = values in this interval not displayed.      BP Readings from Last 3 Encounters:   04/27/17 (!) 171/118   04/26/17 132/78   02/13/17 (!) 174/118    Wt Readings from Last 3 Encounters:   04/27/17 258 lb (117 kg)   04/26/17 267 lb (121.1 kg)   02/13/17 267 lb (121.1 kg)                    Reviewed and updated as needed this visit by clinical staff  Tobacco  Allergies  Meds  Problems  Med Hx  Surg Hx  Fam Hx  Soc Hx        Reviewed and  updated as needed this visit by Provider  Allergies  Meds  Problems         ROS:  Constitutional, skin, neuro, cardiovascular, pulmonary, gi systems are negative, except as otherwise noted.    OBJECTIVE:                                                    BP (!) 171/118 (BP Location: Right arm, Patient Position: Chair, Cuff Size: Adult Large)  Pulse 72  Temp 98.2  F (36.8  C) (Oral)  Resp 18  Wt 258 lb (117 kg)  BMI 38.1 kg/m2  Body mass index is 38.1 kg/(m^2).  GENERAL: alert, no distress and obese  SKIN: puncture noted on left dorsal hand with surrounding redness, swelling and warmth. Erythematous streaking vs superficial abrasion noted on dorsal left forearm.   PSYCH: mentation appears normal, affect normal/bright    Diagnostic Test Results:  none      ASSESSMENT/PLAN:                                                      (S61.451A,  W55.01XA) Cat bite of hand, right, initial encounter  (primary encounter diagnosis)  Comment: worsening per patient and questionable streaking on exam since starting augmentin. Recommending ER follow up given history of diabetes not on medication as well as significantly uncontrolled BP. Patient is in agreement with plan. ER was called and informed to expect arrival. Patient well enough to drive on own and verbally states he will go right away. Is afebrile and mentation is normal. Of note, given this being a work comp visit, BP could not be addressed, however, this uncontrolled BP is a high risk of worsening infection and possible stroke secondary to infection. Strongly recommending starting medications and following up in clinic in the upcoming weeks to discuss this in greater detail.   Plan:     Follow up: ER    Denis Byrd PA-C  [facility]

## 2017-04-27 NOTE — IP AVS SNAPSHOT
MRN:[id number]                      After Visit Summary   4/27/2017    Connor Emerson    MRN: [id number]           Thank you!     Thank you for choosing [facility] for your care. Our goal is always to provide you with excellent care. Hearing back from our patients is one way we can continue to improve our services. Please take a few minutes to complete the written survey that you may receive in the mail after you visit. If you would like to speak to someone directly about your visit please contact Patient Relations at [phone]. Thank you!          Patient Information     Date Of Birth          1980        Designated Caregiver       Most Recent Value    Caregiver    Will someone help with your care after discharge? yes    Name of designated caregiver Diamond Emerson    Phone number of caregiver     Caregiver address [address] [facility] [address]      About your hospital stay     You were admitted on:  April 27, 2017 You last received care in the:  [place] [address] [facility]    You were discharged on:  April 27, 2017       Who to Call     For medical emergencies, please call 911.  For non-urgent questions about your medical care, please call your primary care provider or clinic, [phone]          Attending Provider     Provider Specialty    Alex Parks MD Emergency Medicine    [facility], Mita Singh MD Internal Medicine       Primary Care Provider Office Phone # Fax #    Lizandro Hughes [phone]       [facility] [id number] [place] [address]        After Care Instructions     Activity       Your activity upon discharge: activity as tolerated            Diet       Follow this diet upon discharge: Orders Placed This Encounter      Combination Diet 6819-2793 Calories: Moderate Consistent CHO (4-6 CHO units/meal)                  Follow-up Appointments     Follow-up and recommended labs and tests        Follow  "up with primary care provider, Lizandro Hughes, within 7 days for hospital follow- up.  The following labs/tests are recommended: bmp.                  Pending Results     Date and Time Order Name Status Description    4/27/2017 1316 Blood culture Preliminary     4/27/2017 1316 Blood culture Preliminary             Statement of Approval     Ordered          04/27/17 2016  I have reviewed and agree with all the recommendations and orders detailed in this document.  EFFECTIVE NOW     Approved and electronically signed by:  Sanam Nowak MD             Admission Information     Date & Time Provider Department Dept. Phone    4/27/2017 Mita De Anda MD [place] [address] [facility] [phone]      Your Vitals Were     Blood Pressure Pulse Temperature Respirations Height Weight    151/103 (BP Location: Left arm) 77 98.9  F (37.2  C) (Oral) 16 1.702 m (5' 7\") 117.4 kg (258 lb 14.4 oz)    Pulse Oximetry BMI (Body Mass Index)                96% 40.55 kg/m2          My [facility] Information     [facility] gives you secure access to your electronic health record. If you see a primary care provider, you can also send messages to your care team and make appointments. If you have questions, please call your primary care clinic.  If you do not have a primary care provider, please call [phone] and they will assist you.        Care EveryWhere ID     This is your Care EveryWhere ID. This could be used by other organizations to access your [facility] medical records  [id number]           Review of your medicines      CONTINUE these medicines which have NOT CHANGED        Dose / Directions    amoxicillin-clavulanate 875-125 MG per tablet   Commonly known as:  AUGMENTIN   Used for:  Cat bite of hand, right, initial encounter        Dose:  1 tablet   Take 1 tablet by mouth 2 times daily   Quantity:  20 tablet   Refills:  0       ASPIRIN NOT PRESCRIBED   Commonly known as:  INTENTIONAL   Used for:  Type 2 diabetes mellitus with " diabetic polyneuropathy (H)        Reported on 2/13/2017   Refills:  0       EPINEPHrine 0.3 MG/0.3ML injection   Used for:  Allergic reaction, initial encounter        Dose:  0.3 mg   Inject 0.3 mLs (0.3 mg) into the muscle once as needed for anaphylaxis   Quantity:  1 each   Refills:  0                Protect others around you: Learn how to safely use, store and throw away your medicines at www.disposemymeds.org.             Medication List: This is a list of all your medications and when to take them. Check marks below indicate your daily home schedule. Keep this list as a reference.      Medications           Morning Afternoon Evening Bedtime As Needed    amoxicillin-clavulanate 875-125 MG per tablet   Commonly known as:  AUGMENTIN   Take 1 tablet by mouth 2 times daily                                ASPIRIN NOT PRESCRIBED   Commonly known as:  INTENTIONAL   Reported on 2/13/2017                                EPINEPHrine 0.3 MG/0.3ML injection   Inject 0.3 mLs (0.3 mg) into the muscle once as needed for anaphylaxis
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
## 2017-04-27 NOTE — ED NOTES
ABCs intact. Pt was bit by a cat yesterday. [facility] and was given augmentin. Pt has take 2 doses. Pt L had reddened and swollen. Pt went to clinic and sent here for further evaluation.

## 2017-04-27 NOTE — IP AVS SNAPSHOT
[place] [address] [facility]    [address]    [facility] [address]    Phone:  [phone]    Fax:  [phone]                                       After Visit Summary   4/27/2017    Connor Emerson    MRN: [id number]           After Visit Summary Signature Page     I have received my discharge instructions, and my questions have been answered. I have discussed any challenges I see with this plan with the nurse or doctor.    ..........................................................................................................................................  Patient/Patient Representative Signature      ..........................................................................................................................................  Patient Representative Print Name and Relationship to Patient    ..................................................               ................................................  Date                                            Time    ..........................................................................................................................................  Reviewed by Signature/Title    ...................................................              ..............................................  Date                                                            Time

## 2017-04-27 NOTE — PLAN OF CARE
Problem: Goal Outcome Summary  Goal: Goal Outcome Summary  Outcome: No Change  Patient admitted from ER due to cellulitis around cat bite. Reports minimal pain. Initiated on IV abx. Afebrile. Denies numbness/tingling. Mod CHO diet. Restarted on BP and diabetic medications. Ambulating independently.

## 2017-04-27 NOTE — PHARMACY
Requested by ERIC [facility] to see what meds pt has been prescribed, but has not been taking.  Pt has been prescribed lisinopril/HCTZ 20/12.5 1 tab daily, metformin ER 500mg 1 tab daily with supper, norvasc 5mg daily, pravastatin 20mg daily.  Note, these was prescribed on 2/13/17, but never filled.     Pt also had meds prescribed, but never filled in Nov 2016 (norvasc, pravastatin, metformin) and August 2016 (HCTZ, metformin, lisinopril, vitamin D).

## 2017-04-27 NOTE — TELEPHONE ENCOUNTER
"Pt calling was seen in UC last night for cat bite to the hand.       He was started on oral PCN.  States the swelling has continued to worsen over night \"my hand is so fat I can't even bend it\"   Hand is pink and warm to the touch.     Pt advised to ER as may need IV or injection of antibiotic and he is a type 2 diabetic so this does compromise his healing as well.     Pt expressed understanding and acceptance of the plan.  Pt had no further questions at this time.  Advised can call back to clinic at any time with concerns.       Diana Bernardo RN    "

## 2017-04-27 NOTE — ED PROVIDER NOTES
"  History     Chief Complaint:  Cat Bite      HPI   Connor Emerson is a 36 year old male who presents for evaluation of a cat bite to his right hand. Patient was bit by a domestic cat yesterday while at work on the dorsum of his right hand. This occurred around 1330. The patient initially put hydrogen peroxide on the wound. He then went to urgent care yesterday evening where he was prescribed Augmentin. The bite became more red and swollen this morning, prompting him to return to [facility]. The patient was referred to the ED for further evaluation. The patient has had 2 doses of Augmentin to date. He is unsure of the cat's vaccination status but the owner reported that it was up to date (later confirmed by vet contacted by family members to be up to date on vaccinations). Patient's tetanus is up to date per chart review. He notes that his hand is mildly painful. He does not report any fevers or any other symptoms.     Allergies:  Vicodin [Hydrocodone-Acetaminophen]     Medications:    Augmentin  Aspirin    Past Medical History:    Diabetes type II  GERD  Hypertension   Insomnia   Migraines     Past Surgical History:    Rotator cuff repair     Family History:    Mother - uterine cancer    Social History:  Marital Status:     Smoking status: Never smoker  Alcohol use: Yes      Review of Systems   Constitutional: Negative for fever.   Skin: Positive for wound (cat bite).   All other systems reviewed and are negative.      Physical Exam     Patient Vitals for the past 24 hrs:   BP Temp Temp src Pulse Heart Rate Resp SpO2 Height Weight   04/27/17 1059 (!) 163/116 - - - 65 18 95 % - -   04/27/17 0904 (!) 168/117 98.4  F (36.9  C) Oral 77 - 18 100 % 1.676 m (5' 6\") 117 kg (258 lb)      Physical Exam  General:   Well-nourished   Speaking in full sentences  Eyes:   Conjunctiva without injection or scleral icterus  Resp:   Lungs CTAB   No crackles, wheezing or audible rubs   Good air movement  CV:    Normal rate, regular " rhythm   S1 and S2 present   No murmur, gallop or rub  GI:   BS present   Abdomen soft without distention   Non-tender to light and deep palpation   No guarding or rebound tenderness  Skin:   Warm, dry, well perfused   2 puncture wounds with associated swelling and surrounding erythema to dorsum of R hand   No erythema on volar aspect   Able to passively flex/extend digits 1-5   Radial pulse 2+   Superficial abrasion over dorsum of R forearm  MSK:   Moves all extremities   Swelling about dorsum of hand  Neuro:   Alert   Answers questions appropriately   Moves all extremities equally   Gait stable  Psych:   Normal affect, normal mood or rebound tenderness    Emergency Department Course     Imaging:  Radiographic findings were communicated with the patient who voiced understanding of the findings.    XR Right Hand G/E 3 views  IMPRESSION: No fractures are seen in the right hand. No radiodense  foreign bodies or soft tissue gas identified. Joints are preserved and  in normal alignment.  Report per radiology.     Laboratory:  CBC:  WBC 9.3, HGB 15.2, , otherwise WNL  BMP: Glucose 133 (H), otherwise WNL (Creatinine 0.69)     Interventions:  (1109) Unasyn 3 grams IV     Emergency Department Course:  Nursing notes and vitals reviewed.  (0831) I performed an exam of the patient as documented above.    (7546) I consulted with [place] [facility] regarding patient.     Blood was drawn from the patient. This was sent for laboratory testing, findings above.    The patient was sent for a hand xray while in the emergency department, findings above.      (1018) Patient update.     (1039) Patient update. Patient was able to contact family of the cat who had contacted the vet who confirmed that cat's immunizations were up to date. Findings and plan explained to the patient who consents to admission.     (7166) I discussed the patient with Alecia Greenwood of the hospitalist service, who will admit the patient to a  medical bed for further monitoring, evaluation, and treatment.      Impression & Plan      Medical Decision Making:  Connor Emerson is a 36 year old male presenting to the ED for evaluation of a cat bite to his right hand. Vital signs on presentation as above. Physical exam notable for erythema, tenderness, and swelling about the dorsum of his right hand. Xray is obtained and is negative for retained foreign body or acute bony abnormality. The patient has been on oral antibiotics for approximately 24 hours with progression of symptoms. At this point we will plan for IV antibiotics (unasyn) and hospital admission for further treatment and care. Patient was able to contact the vet of the cat who verified that immunizations are up to date. They will also contact the family to ensure monitoring of the cat for approximately 10 days and to seek animal control should the animal develop any changes in behavior, increasing aggressiveness, foaming at the mouth, etc.  Laboratory studies reveal unremarkable CBC, BMP. Tetanus is up to date. At this point patient will be admitted to the hospitalist service under the care of Dr. De Anda for further treatment and care. All questions answered prior to admission.     Diagnosis:    ICD-10-CM   1. Cat bite of hand, right, initial encounter S61.451A    W55.01XA   2. Cellulitis of right upper extremity L03.113       Disposition:  Patient is admitted to a medical bed under the care of Dr. De Anda.       Kalen Carrion  4/27/2017   [facility] EMERGENCY DEPARTMENT    IKalen, am serving as a scribe on 4/27/2017 at 9:31 AM to personally document services performed by Dr. Parks based on my observations and the provider's statements to me.        Alex Praks MD  04/27/17 1640

## 2017-04-28 ENCOUNTER — TELEPHONE (OUTPATIENT)
Dept: FAMILY MEDICINE | Facility: CLINIC | Age: 37
End: 2017-04-28
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
## 2017-04-28 NOTE — PLAN OF CARE
Problem: Goal Outcome Summary  Goal: Goal Outcome Summary  Outcome: Adequate for Discharge Date Met:  04/28/17  Dc to home. No medications needed per pt. Doctor called as pt stated he needed to go home tonight. bp elevated, decreased with bp med and rest. Doctor informed. Right hand bite marks clean, dry and intact. Pink and edematous. Marked with marker. Pt states is looking better. Discussed with pt need to see md asap to talk to her about his diabetes and high blood pressure and follow up on cellulitus. He states he made an appointment with his pcp this evening on My Chart, to be seen first thing in the morning. Dc instructions and medications reviewed and questions answered.

## 2017-04-28 NOTE — TELEPHONE ENCOUNTER
Please contact patient for In-patient (OBS) follow up.  [phone] (home) NONE (work)    Visit date: 4/27/17  Diagnosis listed:Cat Bite Of Hand, Right, Initial Encounter  Number of visits in past 12 months:1/1      Appears patient is going to [place] now.    Alecia JONAS    [facility] Radha Webb

## 2017-04-28 NOTE — TELEPHONE ENCOUNTER
ED / Discharge Outreach Protocol    Patient Contact    Attempt # 1    Was call answered?  No.  Left message on voicemail with information to call me back.    Pennie Rabago RN

## 2017-05-03 LAB
BACTERIA SPEC CULT: NO GROWTH
BACTERIA SPEC CULT: NO GROWTH
Lab: NORMAL
Lab: NORMAL
MICRO REPORT STATUS: NORMAL
MICRO REPORT STATUS: NORMAL
SPECIMEN SOURCE: NORMAL
SPECIMEN SOURCE: NORMAL

## 2017-05-11 ENCOUNTER — OFFICE VISIT (OUTPATIENT)
Dept: FAMILY MEDICINE | Facility: CLINIC | Age: 37
End: 2017-05-11
Payer: COMMERCIAL

## 2017-05-11 VITALS
DIASTOLIC BLOOD PRESSURE: 118 MMHG | RESPIRATION RATE: 16 BRPM | HEART RATE: 82 BPM | SYSTOLIC BLOOD PRESSURE: 170 MMHG | HEIGHT: 67 IN | WEIGHT: 257.6 LBS | TEMPERATURE: 98.2 F | OXYGEN SATURATION: 99 % | BODY MASS INDEX: 40.43 KG/M2

## 2017-05-11 DIAGNOSIS — E11.9 TYPE 2 DIABETES MELLITUS WITHOUT COMPLICATION, WITHOUT LONG-TERM CURRENT USE OF INSULIN (H): Primary | ICD-10-CM

## 2017-05-11 DIAGNOSIS — G43.719 INTRACTABLE CHRONIC MIGRAINE WITHOUT AURA AND WITHOUT STATUS MIGRAINOSUS: ICD-10-CM

## 2017-05-11 DIAGNOSIS — I10 HTN, GOAL BELOW 140/90: ICD-10-CM

## 2017-05-11 PROCEDURE — 99214 OFFICE O/P EST MOD 30 MIN: CPT | Performed by: NURSE PRACTITIONER

## 2017-05-11 RX ORDER — LISINOPRIL 20 MG/1
20 TABLET ORAL DAILY
Qty: 90 TABLET | Refills: 0 | Status: SHIPPED | OUTPATIENT
Start: 2017-05-11 | End: 2017-11-27

## 2017-05-11 RX ORDER — PRAVASTATIN SODIUM 20 MG
20 TABLET ORAL DAILY
Qty: 90 TABLET | Refills: 0 | Status: SHIPPED | OUTPATIENT
Start: 2017-05-11 | End: 2017-11-27

## 2017-05-11 RX ORDER — HYDROCHLOROTHIAZIDE 12.5 MG/1
12.5 TABLET ORAL DAILY
Qty: 90 TABLET | Refills: 0 | Status: SHIPPED | OUTPATIENT
Start: 2017-05-11 | End: 2017-06-29

## 2017-05-11 RX ORDER — METFORMIN HCL 500 MG
500 TABLET, EXTENDED RELEASE 24 HR ORAL
Qty: 90 TABLET | Refills: 0 | Status: SHIPPED | OUTPATIENT
Start: 2017-05-11 | End: 2018-02-09

## 2017-05-11 NOTE — PROGRESS NOTES
SUBJECTIVE:                                                    Connor Emerson is a 37 year old male who presents to clinic today for the following health issues:    Hypertension Follow-up      Outpatient blood pressures are not being checked.    Low Salt Diet: low salt       Amount of exercise or physical activity: Walking all day during work     Problems taking medications regularly: No    Medication side effects: none    Diet: regular (no restrictions)    Pt would like to start on his medications again.     Has been having headaches again.  No meds regularly for the past year.  He is working a lot.  Moved back in with his family.  No regular marijuana or etoh now.  He is drinking mt dew, less than before.  He is eating one main meal per day.  + nocturia.  No chest pain, palpitations, sob.  He notes his weight is stable.  He reports his headache is improving.  They are the same as when his bp was uncontrolled previously.    Problem list and histories reviewed & adjusted, as indicated.  Additional history: as documented    Patient Active Problem List   Diagnosis     CARDIOVASCULAR SCREENING; LDL GOAL LESS THAN 160     Low back pain     Insomnia     Obesity     HTN, goal below 140/90     Anxiety     GERD (gastroesophageal reflux disease)     Atypical chest pain     Intractable chronic migraine without aura     Type 2 diabetes mellitus without complication (H)     Adjustment disorder with mixed anxiety and depressed mood     Lateral epicondylitis of right elbow     Cellulitis of hand, right     Past Surgical History:   Procedure Laterality Date     INJECT BLOCK MEDIAL BRANCH CERVICAL/THORACIC/LUMBAR       ORTHOPEDIC SURGERY      Ganesh. Rotator cuff repair.       Social History   Substance Use Topics     Smoking status: Never Smoker     Smokeless tobacco: Never Used     Alcohol use 0.0 oz/week     0 Standard drinks or equivalent per week      Comment: social     Family History   Problem Relation Age of Onset     CANCER  "Mother      uterine     Family History Negative Father      CANCER Maternal Uncle      melanoma           Reviewed and updated as needed this visit by clinical staff       Reviewed and updated as needed this visit by Provider         ROS:  SEE HPI.    OBJECTIVE:                                                    BP (!) 170/118 (BP Location: Right arm, Patient Position: Chair, Cuff Size: Adult Large)  Pulse 82  Temp 98.2  F (36.8  C) (Oral)  Resp 16  Ht 5' 7\" (1.702 m)  Wt 257 lb 9.6 oz (116.8 kg)  SpO2 99%  BMI 40.35 kg/m2  Body mass index is 40.35 kg/(m^2).  GENERAL: healthy, alert and no distress  RESP: lungs clear to auscultation - no rales, rhonchi or wheezes  CV: regular rate and rhythm, normal S1 S2, no S3 or S4, no murmur, click or rub, no peripheral edema and peripheral pulses strong  ABDOMEN: soft, nontender, bowel sounds normal and obese.  NEURO: Normal strength and tone, sensory exam grossly normal, mentation intact, cranial nerves 2-12 intact and rapid alternating movements normal  PSYCH: mentation appears normal, affect normal/bright    Diagnostic Test Results:  none      ASSESSMENT/PLAN:                                                    1. Type 2 diabetes mellitus without complication, without long-term current use of insulin (H)  Repeat fasting labs in 2 weeks when he sees me.  Recent labs normal in ED for unrelated cause.  Restart metformin, bp meds.  Pt agrees with plan and verbalized understanding.  - metFORMIN (GLUCOPHAGE-XR) 500 MG 24 hr tablet; Take 1 tablet (500 mg) by mouth daily (with dinner)  Dispense: 90 tablet; Refill: 0  - lisinopril (PRINIVIL/ZESTRIL) 20 MG tablet; Take 1 tablet (20 mg) by mouth daily  Dispense: 90 tablet; Refill: 0  - hydrochlorothiazide 12.5 MG TABS tablet; Take 1 tablet (12.5 mg) by mouth daily  Dispense: 90 tablet; Refill: 0  - pravastatin (PRAVACHOL) 20 MG tablet; Take 1 tablet (20 mg) by mouth daily  Dispense: 90 tablet; Refill: 0  - Comprehensive metabolic " panel; Future  - TSH with free T4 reflex; Future  - Lipid panel reflex to direct LDL; Future  - Albumin Random Urine Quantitative; Future  - Hemoglobin A1c; Future    2. HTN, goal below 140/90  Poorly controlled.  Restart meds slowly.  Tolerated well in the past.  See me in 2 weeks.  Pt agrees with plan and verbalized understanding.    3. Intractable chronic migraine without aura and without status migrainosus  Likely problematic again due to poor control of other conditions  See above.  Improving currently.    JERRY Alicia Ra, CNP  [facility]

## 2017-05-11 NOTE — NURSING NOTE
"Chief Complaint   Patient presents with     Consult     pt states that he would like to get back on his medications        Initial BP (!) 170/118 (BP Location: Right arm, Patient Position: Chair, Cuff Size: Adult Large)  Pulse 82  Temp 98.2  F (36.8  C) (Oral)  Resp 16  Ht 5' 7\" (1.702 m)  Wt 257 lb 9.6 oz (116.8 kg)  SpO2 99%  BMI 40.35 kg/m2 Estimated body mass index is 40.35 kg/(m^2) as calculated from the following:    Height as of this encounter: 5' 7\" (1.702 m).    Weight as of this encounter: 257 lb 9.6 oz (116.8 kg).  Medication Reconciliation: complete   Loli Garcia MA       "

## 2017-05-11 NOTE — MR AVS SNAPSHOT
After Visit Summary   5/11/2017    Connor Emerson    MRN: [id number]           Patient Information     Date Of Birth          1980        Visit Information        Provider Department      5/11/2017 3:00 PM Radha Shetty Ra, APRN CNP [facility]        Today's Diagnoses     Type 2 diabetes mellitus without complication, without long-term current use of insulin (H)    -  1    Intractable chronic migraine without aura and without status migrainosus           Follow-ups after your visit        Future tests that were ordered for you today     Open Future Orders        Priority Expected Expires Ordered    Comprehensive metabolic panel Routine  5/11/2018 5/11/2017    TSH with free T4 reflex Routine  5/11/2018 5/11/2017    Lipid panel reflex to direct LDL Routine  5/11/2018 5/11/2017    Albumin Random Urine Quantitative Routine  5/11/2018 5/11/2017    Hemoglobin A1c Routine  5/11/2018 5/11/2017            Who to contact     If you have questions or need follow up information about today's clinic visit or your schedule please contact [facility] directly at [phone].  Normal or non-critical lab and imaging results will be communicated to you by [employer], letter or phone within 4 business days after the clinic has received the results. If you do not hear from us within 7 days, please contact the clinic through "[employer]t or phone. If you have a critical or abnormal lab result, we will notify you by phone as soon as possible.  Submit refill requests through [employer] or call your pharmacy and they will forward the refill request to us. Please allow 3 business days for your refill to be completed.          Additional Information About Your Visit        [employer] Information     [employer] gives you secure access to your electronic health record. If you see a primary care provider, you can also send messages to your care team and make appointments. If you have questions, please call  "your primary care clinic.  If you do not have a primary care provider, please call [phone] and they will assist you.        Care EveryWhere ID     This is your Care EveryWhere ID. This could be used by other organizations to access your [place] medical records  [id number]        Your Vitals Were     Pulse Temperature Respirations Height Pulse Oximetry BMI (Body Mass Index)    82 98.2  F (36.8  C) (Oral) 16 5' 7\" (1.702 m) 99% 40.35 kg/m2       Blood Pressure from Last 3 Encounters:   05/11/17 (!) 170/118   04/27/17 (!) 151/103   04/27/17 (!) 171/118    Weight from Last 3 Encounters:   05/11/17 257 lb 9.6 oz (116.8 kg)   04/27/17 258 lb 14.4 oz (117.4 kg)   04/27/17 258 lb (117 kg)                 Today's Medication Changes          These changes are accurate as of: 5/11/17  3:28 PM.  If you have any questions, ask your nurse or doctor.               Start taking these medicines.        Dose/Directions    hydrochlorothiazide 12.5 MG Tabs tablet   Used for:  Type 2 diabetes mellitus without complication, without long-term current use of insulin (H)   Started by:  Radha Shetty Ra, APRN CNP        Dose:  12.5 mg   Take 1 tablet (12.5 mg) by mouth daily   Quantity:  90 tablet   Refills:  0       lisinopril 20 MG tablet   Commonly known as:  PRINIVIL/ZESTRIL   Used for:  Type 2 diabetes mellitus without complication, without long-term current use of insulin (H)   Started by:  Radha Shetty Ra, APRN CNP        Dose:  20 mg   Take 1 tablet (20 mg) by mouth daily   Quantity:  90 tablet   Refills:  0       metFORMIN 500 MG 24 hr tablet   Commonly known as:  GLUCOPHAGE-XR   Used for:  Type 2 diabetes mellitus without complication, without long-term current use of insulin (H)   Started by:  Radha Shetty Ra, APRN CNP        Dose:  500 mg   Take 1 tablet (500 mg) by mouth daily (with dinner)   Quantity:  90 tablet   Refills:  0       pravastatin 20 MG tablet   Commonly known as:  PRAVACHOL   Used for:  Type 2 " diabetes mellitus without complication, without long-term current use of insulin (H)   Started by:  Radha Shetty Ra, APRN CNP        Dose:  20 mg   Take 1 tablet (20 mg) by mouth daily   Quantity:  90 tablet   Refills:  0            Where to get your medicines      These medications were sent to [facility] [address]  [address] [facility] [address]     Phone:  [phone]     hydrochlorothiazide 12.5 MG Tabs tablet    lisinopril 20 MG tablet    metFORMIN 500 MG 24 hr tablet    pravastatin 20 MG tablet                Primary Care Provider Office Phone # Fax #    JERRY Alicia Ra, [phone]       [facility] [address] [facility] [address]        Thank you!     Thank you for choosing [facility]  for your care. Our goal is always to provide you with excellent care. Hearing back from our patients is one way we can continue to improve our services. Please take a few minutes to complete the written survey that you may receive in the mail after your visit with us. Thank you!             Your Updated Medication List - Protect others around you: Learn how to safely use, store and throw away your medicines at www.disposemymeds.org.          This list is accurate as of: 5/11/17  3:28 PM.  Always use your most recent med list.                   Brand Name Dispense Instructions for use    ASPIRIN NOT PRESCRIBED    INTENTIONAL     Reported on 5/11/2017       EPINEPHrine 0.3 MG/0.3ML injection     1 each    Inject 0.3 mLs (0.3 mg) into the muscle once as needed for anaphylaxis       hydrochlorothiazide 12.5 MG Tabs tablet     90 tablet    Take 1 tablet (12.5 mg) by mouth daily       lisinopril 20 MG tablet    PRINIVIL/ZESTRIL    90 tablet    Take 1 tablet (20 mg) by mouth daily       metFORMIN 500 MG 24 hr tablet    GLUCOPHAGE-XR    90 tablet    Take 1 tablet (500 mg) by mouth daily (with dinner)       pravastatin  20 MG tablet    PRAVACHOL    90 tablet    Take 1 tablet (20 mg) by mouth daily

## 2017-06-29 ENCOUNTER — OFFICE VISIT (OUTPATIENT)
Dept: FAMILY MEDICINE | Facility: CLINIC | Age: 37
End: 2017-06-29
Payer: COMMERCIAL

## 2017-06-29 VITALS
RESPIRATION RATE: 16 BRPM | SYSTOLIC BLOOD PRESSURE: 150 MMHG | DIASTOLIC BLOOD PRESSURE: 110 MMHG | TEMPERATURE: 98.1 F | HEART RATE: 67 BPM | BODY MASS INDEX: 40.25 KG/M2 | WEIGHT: 257 LBS | OXYGEN SATURATION: 97 %

## 2017-06-29 DIAGNOSIS — R40.20 LOSS OF CONSCIOUSNESS (H): ICD-10-CM

## 2017-06-29 DIAGNOSIS — R35.1 NOCTURIA: ICD-10-CM

## 2017-06-29 DIAGNOSIS — I10 HTN, GOAL BELOW 140/90: Primary | ICD-10-CM

## 2017-06-29 DIAGNOSIS — E11.9 TYPE 2 DIABETES MELLITUS WITHOUT COMPLICATION, WITHOUT LONG-TERM CURRENT USE OF INSULIN (H): ICD-10-CM

## 2017-06-29 DIAGNOSIS — M79.10 MUSCLE PAIN: ICD-10-CM

## 2017-06-29 LAB
CREAT UR-MCNC: 273 MG/DL
HBA1C MFR BLD: 6.4 % (ref 4.3–6)
MICROALBUMIN UR-MCNC: 57 MG/L
MICROALBUMIN/CREAT UR: 20.99 MG/G CR (ref 0–17)

## 2017-06-29 PROCEDURE — 84443 ASSAY THYROID STIM HORMONE: CPT | Performed by: FAMILY MEDICINE

## 2017-06-29 PROCEDURE — 82043 UR ALBUMIN QUANTITATIVE: CPT | Performed by: FAMILY MEDICINE

## 2017-06-29 PROCEDURE — 36415 COLL VENOUS BLD VENIPUNCTURE: CPT | Performed by: FAMILY MEDICINE

## 2017-06-29 PROCEDURE — 80061 LIPID PANEL: CPT | Performed by: FAMILY MEDICINE

## 2017-06-29 PROCEDURE — 80053 COMPREHEN METABOLIC PANEL: CPT | Performed by: FAMILY MEDICINE

## 2017-06-29 PROCEDURE — 83036 HEMOGLOBIN GLYCOSYLATED A1C: CPT | Performed by: FAMILY MEDICINE

## 2017-06-29 PROCEDURE — 99214 OFFICE O/P EST MOD 30 MIN: CPT | Performed by: NURSE PRACTITIONER

## 2017-06-29 RX ORDER — AMLODIPINE BESYLATE 5 MG/1
5 TABLET ORAL DAILY
Qty: 30 TABLET | Refills: 1 | Status: SHIPPED | OUTPATIENT
Start: 2017-06-29 | End: 2017-11-27

## 2017-06-29 RX ORDER — HYDROCHLOROTHIAZIDE 25 MG/1
25 TABLET ORAL DAILY
Qty: 30 TABLET | Refills: 1 | Status: SHIPPED | OUTPATIENT
Start: 2017-06-29 | End: 2017-11-27

## 2017-06-29 NOTE — MR AVS SNAPSHOT
After Visit Summary   6/29/2017    Connor Emerson    MRN: [id number]           Patient Information     Date Of Birth          1980        Visit Information        Provider Department      6/29/2017 1:20 PM Radha Shetty Ra, JERRY [facility]        Today's Diagnoses     HTN, goal below 140/90    -  1    Loss of consciousness (H)        Nocturia        Muscle pain          Care Instructions    Take the new dose of hydrochlorothiazide.  Start amlodipine every day.  Schedule with cardiology.  Schedule with urology.              Follow-ups after your visit        Additional Services     CARDIOLOGY EVAL ADULT REFERRAL       Your provider has referred you to:  [facility]: [facility] [phone]   [email or web address]    Please be aware that coverage of these services is subject to the terms and limitations of your health insurance plan.  Call member services at your health plan with any benefit or coverage questions.      Type of Referral:  New Cardiology Consult    Timeframe requested:  Within 1 month    Please bring the following to your appointment:  >>   Any x-rays, CTs or MRIs which have been performed.  Contact the facility where they were done to arrange for  prior to your scheduled appointment.    >>   List of current medications  >>   This referral request   >>   Any documents/labs given to you for this referral            NEUROLOGY ADULT REFERRAL       Your provider has referred you to: [facility]: [facility] [phone]   [email or web address]    Reason for Referral: Consult    Please be aware that coverage of these services is subject to the terms and limitations of your health insurance plan.  Call member services at your health plan with any benefit or coverage questions.      Please bring the following with you to your  appointment:    (1) Any X-Rays, CTs or MRIs which have been performed.  Contact the facility where they were done to arrange for  prior to your scheduled appointment.    (2) List of current medications  (3) This referral request   (4) Any documents/labs given to you for this referral            UROLOGY ADULT REFERRAL       Your provider has referred you to: [facility] [phone]   [email or web address]    Please be aware that coverage of these services is subject to the terms and limitations of your health insurance plan.  Call member services at your health plan with any benefit or coverage questions.      Please bring the following with you to your appointment:    (1) Any X-Rays, CTs or MRIs which have been performed.  Contact the facility where they were done to arrange for  prior to your scheduled appointment.    (2) List of current medications  (3) This referral request   (4) Any documents/labs given to you for this referral                  Who to contact     If you have questions or need follow up information about today's clinic visit or your schedule please contact [facility] directly at [phone].  Normal or non-critical lab and imaging results will be communicated to you by MyChart, letter or phone within 4 business days after the clinic has received the results. If you do not hear from us within 7 days, please contact the clinic through I Just Sharedhart or phone. If you have a critical or abnormal lab result, we will notify you by phone as soon as possible.  Submit refill requests through Trove or call your pharmacy and they will forward the refill request to us. Please allow 3 business days for your refill to be completed.          Additional Information About Your Visit        Trove Information     Trove gives you secure access to your electronic health record. If you see a primary care provider, you can also send messages  to your care team and make appointments. If you have questions, please call your primary care clinic.  If you do not have a primary care provider, please call [phone] and they will assist you.        Care EveryWhere ID     This is your Care EveryWhere ID. This could be used by other organizations to access your [place] medical records  [id number]        Your Vitals Were     Pulse Temperature Respirations Pulse Oximetry BMI (Body Mass Index)       67 98.1  F (36.7  C) (Oral) 16 97% 40.25 kg/m2        Blood Pressure from Last 3 Encounters:   06/29/17 (!) 150/110   05/11/17 (!) 170/118   04/27/17 (!) 151/103    Weight from Last 3 Encounters:   06/29/17 257 lb (116.6 kg)   05/11/17 257 lb 9.6 oz (116.8 kg)   04/27/17 258 lb 14.4 oz (117.4 kg)              We Performed the Following     CARDIOLOGY EVAL ADULT REFERRAL     CK total     NEUROLOGY ADULT REFERRAL     UROLOGY ADULT REFERRAL          Today's Medication Changes          These changes are accurate as of: 6/29/17  2:01 PM.  If you have any questions, ask your nurse or doctor.               Start taking these medicines.        Dose/Directions    amLODIPine 5 MG tablet   Commonly known as:  NORVASC   Used for:  HTN, goal below 140/90   Started by:  Radha Shetty Ra, APRN CNP        Dose:  5 mg   Take 1 tablet (5 mg) by mouth daily   Quantity:  30 tablet   Refills:  1         These medicines have changed or have updated prescriptions.        Dose/Directions    hydrochlorothiazide 25 MG tablet   Commonly known as:  HYDRODIURIL   This may have changed:    - medication strength  - how much to take   Used for:  HTN, goal below 140/90   Changed by:  Radha Shetty Ra, APRN CNP        Dose:  25 mg   Take 1 tablet (25 mg) by mouth daily   Quantity:  30 tablet   Refills:  1            Where to get your medicines      [facility] [address] [place] [address] [address] [place] [address]  [address],  [facility] [address]    Hours:  24-hours Phone:  [phone]     amLODIPine 5 MG tablet    hydrochlorothiazide 25 MG tablet                Primary Care Provider Office Phone # Fax #    JERRY lAicia Ra CARRINGTON [phone]       [facility] [address]  [facility] [address]        Equal Access to Services     [facility] : Hadii aad ku hadasho Soomaali, waaxda luqadaha, qaybta kaalmada adeegyada, waxay idiin hayaan adeeg kharash la'aan ah. So [facility] [phone].    ATENCIÓN: Si habla español, tiene a chu disposición servicios gratuitos de asistencia lingüística. Llame al [phone].    We comply with applicable federal civil rights laws and [place] laws. We do not discriminate on the basis of race, color, national origin, age, disability sex, sexual orientation or gender identity.            Thank you!     Thank you for choosing [facility]  for your care. Our goal is always to provide you with excellent care. Hearing back from our patients is one way we can continue to improve our services. Please take a few minutes to complete the written survey that you may receive in the mail after your visit with us. Thank you!             Your Updated Medication List - Protect others around you: Learn how to safely use, store and throw away your medicines at www.disposemymeds.org.          This list is accurate as of: 6/29/17  2:01 PM.  Always use your most recent med list.                   Brand Name Dispense Instructions for use Diagnosis    amLODIPine 5 MG tablet    NORVASC    30 tablet    Take 1 tablet (5 mg) by mouth daily    HTN, goal below 140/90       ASPIRIN NOT PRESCRIBED    INTENTIONAL     Reported on 5/11/2017    Type 2 diabetes mellitus with diabetic polyneuropathy (H)       EPINEPHrine 0.3 MG/0.3ML injection     1 each    Inject 0.3 mLs (0.3 mg) into the muscle once as needed for anaphylaxis    Allergic reaction, initial encounter       hydrochlorothiazide  25 MG tablet    HYDRODIURIL    30 tablet    Take 1 tablet (25 mg) by mouth daily    HTN, goal below 140/90       lisinopril 20 MG tablet    PRINIVIL/ZESTRIL    90 tablet    Take 1 tablet (20 mg) by mouth daily    Type 2 diabetes mellitus without complication, without long-term current use of insulin (H)       metFORMIN 500 MG 24 hr tablet    GLUCOPHAGE-XR    90 tablet    Take 1 tablet (500 mg) by mouth daily (with dinner)    Type 2 diabetes mellitus without complication, without long-term current use of insulin (H)       pravastatin 20 MG tablet    PRAVACHOL    90 tablet    Take 1 tablet (20 mg) by mouth daily    Type 2 diabetes mellitus without complication, without long-term current use of insulin (H)

## 2017-06-29 NOTE — NURSING NOTE
"Chief Complaint   Patient presents with     Diabetes       Initial BP (!) 150/110 (BP Location: Right arm, Patient Position: Chair, Cuff Size: Adult Large)  Pulse 67  Temp 98.1  F (36.7  C) (Oral)  Resp 16  Wt 257 lb (116.6 kg)  SpO2 97%  BMI 40.25 kg/m2 Estimated body mass index is 40.25 kg/(m^2) as calculated from the following:    Height as of 5/11/17: 5' 7\" (1.702 m).    Weight as of this encounter: 257 lb (116.6 kg).  Medication Reconciliation: complete.Holland ABDI MA      "

## 2017-06-29 NOTE — PROGRESS NOTES
SUBJECTIVE:                                                    Connor Emerson is a 37 year old male who presents to clinic today for the following health issues:      Diabetes Follow-up      Patient is checking blood sugars: every other day     Diabetic concerns: none      Symptoms of hypoglycemia (low blood sugar): unsure but has urges for sugar     Paresthesias (numbness or burning in feet) or sores: No     Date of last diabetic eye exam: 11/11/2016    Hypertension Follow-up      Outpatient blood pressures are not being checked. 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  He has continued to work and do physical labor without problem.  He denies any chest pain or sob.  He did have an episode 5/19 where he lost consciousness.  His wife was present.  She was concerned about a seizure.  EMS was called and his bp was over 200 systolic.  They wanted him to go to the hospital but he refused.  He denies doing anything differently that day.  Has not had another episode.  His wife does report him talking about chest pain that day.  He does not recall this.    He is taking his medications as directed.  He is having some muscle cramps in his calves and knee pain.  He also continues to urinate about every hour at night.  This does not occur during the day.  No change with med restart.    Problem list and histories reviewed & adjusted, as indicated.  Additional history: as documented    Patient Active Problem List   Diagnosis     CARDIOVASCULAR SCREENING; LDL GOAL  LESS THAN 160     Low back pain     Insomnia     Obesity     HTN, goal below 140/90     Anxiety     GERD (gastroesophageal reflux disease)     Atypical chest pain     Intractable chronic migraine without aura     Type 2 diabetes mellitus without complication (H)     Adjustment disorder with mixed anxiety and depressed mood     Lateral epicondylitis of right elbow     Cellulitis of hand, right     Past Surgical History:   Procedure Laterality Date     INJECT BLOCK MEDIAL BRANCH CERVICAL/THORACIC/LUMBAR       ORTHOPEDIC SURGERY      Ganesh. Rotator cuff repair.       Social History   Substance Use Topics     Smoking status: Never Smoker     Smokeless tobacco: Never Used     Alcohol use 0.0 oz/week     0 Standard drinks or equivalent per week      Comment: social     Family History   Problem Relation Age of Onset     CANCER Mother      uterine     Family History Negative Father      CANCER Maternal Uncle      melanoma           Reviewed and updated as needed this visit by clinical staff  Tobacco  Allergies  Med Hx  Surg Hx  Fam Hx  Soc Hx      Reviewed and updated as needed this visit by Provider         ROS:  SEE HPI.    OBJECTIVE:     BP (!) 150/110 (BP Location: Right arm, Patient Position: Chair, Cuff Size: Adult Large)  Pulse 67  Temp 98.1  F (36.7  C) (Oral)  Resp 16  Wt 257 lb (116.6 kg)  SpO2 97%  BMI 40.25 kg/m2  Body mass index is 40.25 kg/(m^2).  GENERAL: healthy, alert and no distress  NECK: no adenopathy, no asymmetry, masses, or scars and thyroid normal to palpation  RESP: lungs clear to auscultation - no rales, rhonchi or wheezes  CV: regular rate and rhythm, normal S1 S2, no S3 or S4, no murmur, click or rub, no peripheral edema and peripheral pulses strong  ABDOMEN: soft, nontender, no hepatosplenomegaly, no masses and bowel sounds normal  PSYCH: mentation appears normal, affect normal/bright    Diagnostic Test Results:  Results for orders placed or performed in visit on 06/29/17 (from the past  24 hour(s))   Hemoglobin A1c   Result Value Ref Range    Hemoglobin A1C 6.4 (H) 4.3 - 6.0 %       ASSESSMENT/PLAN:   1. HTN, goal below 140/90  37 y.o. Male, hx of DM, htn, here for BP follow up.  Improved since starting meds, but still elevated.  Also had an episode of htn urgency.  He declined eval at a hospital.  Discussed options.  Would like cardiology consult arranged regarding recent episode.  Will continue to work on lowering BP in the meantime.  Increase hydrochlorothiazide to 25mg daily.  Add amlodipine.    See me in 4-6 weeks.  Pt agrees with plan and verbalized understanding.  - hydrochlorothiazide (HYDRODIURIL) 25 MG tablet; Take 1 tablet (25 mg) by mouth daily  Dispense: 30 tablet; Refill: 1  - amLODIPine (NORVASC) 5 MG tablet; Take 1 tablet (5 mg) by mouth daily  Dispense: 30 tablet; Refill: 1  - CARDIOLOGY EVAL ADULT REFERRAL    2. Loss of consciousness (H)  - NEUROLOGY ADULT REFERRAL    3. Nocturia  Chronic issue for pt.  - UROLOGY ADULT REFERRAL    4. Muscle pain  - CK total; Future    JERRY Alicia Ra [facility]

## 2017-06-29 NOTE — PATIENT INSTRUCTIONS
Take the new dose of hydrochlorothiazide.  Start amlodipine every day.  Schedule with cardiology.  Schedule with urology.

## 2017-06-30 LAB
ALBUMIN SERPL-MCNC: 4 G/DL (ref 3.4–5)
ALP SERPL-CCNC: 80 U/L (ref 40–150)
ALT SERPL W P-5'-P-CCNC: 39 U/L (ref 0–70)
ANION GAP SERPL CALCULATED.3IONS-SCNC: 7 MMOL/L (ref 3–14)
AST SERPL W P-5'-P-CCNC: 24 U/L (ref 0–45)
BILIRUB SERPL-MCNC: 0.8 MG/DL (ref 0.2–1.3)
BUN SERPL-MCNC: 20 MG/DL (ref 7–30)
CALCIUM SERPL-MCNC: 8.9 MG/DL (ref 8.5–10.1)
CHLORIDE SERPL-SCNC: 108 MMOL/L (ref 94–109)
CHOLEST SERPL-MCNC: 153 MG/DL
CO2 SERPL-SCNC: 30 MMOL/L (ref 20–32)
CREAT SERPL-MCNC: 0.82 MG/DL (ref 0.66–1.25)
GFR SERPL CREATININE-BSD FRML MDRD: ABNORMAL ML/MIN/1.7M2
GLUCOSE SERPL-MCNC: 134 MG/DL (ref 70–99)
HDLC SERPL-MCNC: 37 MG/DL
LDLC SERPL CALC-MCNC: 102 MG/DL
NONHDLC SERPL-MCNC: 116 MG/DL
POTASSIUM SERPL-SCNC: 4 MMOL/L (ref 3.4–5.3)
PROT SERPL-MCNC: 7 G/DL (ref 6.8–8.8)
SODIUM SERPL-SCNC: 145 MMOL/L (ref 133–144)
TRIGL SERPL-MCNC: 70 MG/DL
TSH SERPL DL<=0.005 MIU/L-ACNC: 1.71 MU/L (ref 0.4–4)

## 2017-07-03 ENCOUNTER — DOCUMENTATION ONLY (OUTPATIENT)
Dept: FAMILY MEDICINE | Facility: CLINIC | Age: 37
End: 2017-07-03

## 2017-07-03 DIAGNOSIS — E87.0 HYPERNATREMIA: Primary | ICD-10-CM

## 2017-07-06 ENCOUNTER — RADIANT APPOINTMENT (OUTPATIENT)
Dept: GENERAL RADIOLOGY | Facility: CLINIC | Age: 37
End: 2017-07-06
Attending: FAMILY MEDICINE
Payer: COMMERCIAL

## 2017-07-06 ENCOUNTER — OFFICE VISIT (OUTPATIENT)
Dept: FAMILY MEDICINE | Facility: CLINIC | Age: 37
End: 2017-07-06
Payer: COMMERCIAL

## 2017-07-06 VITALS
WEIGHT: 256 LBS | RESPIRATION RATE: 16 BRPM | SYSTOLIC BLOOD PRESSURE: 140 MMHG | TEMPERATURE: 98.1 F | HEART RATE: 94 BPM | DIASTOLIC BLOOD PRESSURE: 120 MMHG | HEIGHT: 67 IN | BODY MASS INDEX: 40.18 KG/M2 | OXYGEN SATURATION: 97 %

## 2017-07-06 DIAGNOSIS — E11.9 TYPE 2 DIABETES MELLITUS WITHOUT COMPLICATION, WITHOUT LONG-TERM CURRENT USE OF INSULIN (H): ICD-10-CM

## 2017-07-06 DIAGNOSIS — M25.511 ACUTE PAIN OF RIGHT SHOULDER: ICD-10-CM

## 2017-07-06 DIAGNOSIS — M25.511 ACUTE PAIN OF RIGHT SHOULDER: Primary | ICD-10-CM

## 2017-07-06 PROCEDURE — 73030 X-RAY EXAM OF SHOULDER: CPT | Mod: RT

## 2017-07-06 PROCEDURE — 99214 OFFICE O/P EST MOD 30 MIN: CPT | Performed by: FAMILY MEDICINE

## 2017-07-06 NOTE — PROGRESS NOTES
SUBJECTIVE:                                                    Connor Emerson is a 37 year old male who presents to clinic today for the following health issues:      Musculoskeletal problem/pain      Duration: few weeks    Description  Location: Rt shoulder    Intensity:  moderate    Accompanying signs and symptoms: radiation of pain to shoulder blade area and lack of ROM, unable to lift arm, tightness    History  Previous similar problem: YES  Previous evaluation:  x-ray and Surgery 1999    Precipitating or alleviating factors:  Trauma or overuse: no   Aggravating factors include: lifting, exercise and overuse    Therapies tried and outcome: ice    History of open right rotator cuff repair in 1996 and had arthroscopic rotator cuff repair on the left a couple years ago.  Pts right rhomboid area started bothering him a few weeks ago and then when his son was trying to pop his back by twisting it he states his shoulder felt like is  and since then he has been having pain in the shoulder as well.        Problem list and histories reviewed & adjusted, as indicated.  Additional history: as documented    Patient Active Problem List   Diagnosis     CARDIOVASCULAR SCREENING; LDL GOAL LESS THAN 160     Low back pain     Insomnia     Obesity     HTN, goal below 140/90     Anxiety     GERD (gastroesophageal reflux disease)     Atypical chest pain     Intractable chronic migraine without aura     Type 2 diabetes mellitus without complication (H)     Adjustment disorder with mixed anxiety and depressed mood     Lateral epicondylitis of right elbow     Cellulitis of hand, right     Past Surgical History:   Procedure Laterality Date     INJECT BLOCK MEDIAL BRANCH CERVICAL/THORACIC/LUMBAR       ORTHOPEDIC SURGERY      Ganesh. Rotator cuff repair.       Social History   Substance Use Topics     Smoking status: Never Smoker     Smokeless tobacco: Never Used     Alcohol use 0.0 oz/week     0 Standard drinks or equivalent per  "week      Comment: social     Family History   Problem Relation Age of Onset     CANCER Mother      uterine     Family History Negative Father      CANCER Maternal Uncle      melanoma           Reviewed and updated as needed this visit by clinical staff       Reviewed and updated as needed this visit by Provider         ROS:  Constitutional, HEENT, cardiovascular, pulmonary, gi and gu systems are negative, except as otherwise noted.    OBJECTIVE:     BP (!) 140/120 (BP Location: Right arm, Patient Position: Chair, Cuff Size: Adult Large)  Pulse 94  Temp 98.1  F (36.7  C) (Oral)  Resp 16  Ht 5' 7\" (1.702 m)  Wt 256 lb (116.1 kg)  SpO2 97%  BMI 40.1 kg/m2  Body mass index is 40.1 kg/(m^2).  GENERAL: healthy, alert and no distress  MS: Decreased ROM in the right shoulder in all directions.  Decreased strength in the right arm as well 2/2 pain.  Cross arm negative.  Hawkin negative.  Empty can negative.  Patient is tender over the AC joint.      Diagnostic Test Results:  Xray - No evidence of fracture or AC separation     ASSESSMENT/PLAN:     1. Acute pain of right shoulder  - History and exam not consistent with anything specific  - Most likely a minor rotator cuff tear given his history of surgery in that shoulder  - Will refer to sports med to get their opinion on diagnosis   - Will most likely require some physical therapy  - For now, recommended RICE therapy and NSAIDs  - Patient has an old sling that he will use to help stabilize the shoulder  - XR Shoulder Right G/E 3 Views; Future  - ORTHO  REFERRAL    F/U pending sports med consultation     Caty Meyer,   Formerly named [facility]"

## 2017-07-06 NOTE — TELEPHONE ENCOUNTER
Written 6/29/17       Disp Refills Start End IDALIA   hydrochlorothiazide (HYDRODIURIL) 25 MG tablet 30 tablet 1 6/29/2017

## 2017-07-06 NOTE — MR AVS SNAPSHOT
After Visit Summary   7/6/2017    Connor Emerson    MRN: [id number]           Patient Information     Date Of Birth          1980        Visit Information        Provider Department      7/6/2017 3:40 PM Caty Meyer, DO [facility]        Today's Diagnoses     Acute pain of right shoulder    -  1       Follow-ups after your visit        Additional Services     ORTHO  REFERRAL       [facility] is referring you to the Orthopedic  Services at [facility].       The  Representative will assist you in the coordination of your Orthopedic and Musculoskeletal Care as prescribed by your physician.    The  Representative will call you within 1 business day to help schedule your appointment, or you may contact the  Representative at:    All areas ~ [phone]     Type of Referral : Non Surgical       Timeframe requested: 1 - 2 days    Coverage of these services is subject to the terms and limitations of your health insurance plan.  Please call member services at your health plan with any benefit or coverage questions.      If X-rays, CT or MRI's have been performed, please contact the facility where they were done to arrange for , prior to your scheduled appointment.  Please bring this referral request to your appointment and present it to your specialist.                  Who to contact     If you have questions or need follow up information about today's clinic visit or your schedule please contact [facility] directly at [phone].  Normal or non-critical lab and imaging results will be communicated to you by MyChart, letter or phone within 4 business days after the clinic has received the results. If you do not hear from us within 7 days, please contact the clinic through MyChart or phone. If you have a critical or abnormal lab result, we will notify you by phone as  "soon as possible.  Submit refill requests through Tecnoblu or call your pharmacy and they will forward the refill request to us. Please allow 3 business days for your refill to be completed.          Additional Information About Your Visit        Eyestormhart Information     Tecnoblu gives you secure access to your electronic health record. If you see a primary care provider, you can also send messages to your care team and make appointments. If you have questions, please call your primary care clinic.  If you do not have a primary care provider, please call [phone] and they will assist you.        Care EveryWhere ID     This is your Care EveryWhere ID. This could be used by other organizations to access your [place] medical records  [id number]        Your Vitals Were     Pulse Temperature Respirations Height Pulse Oximetry BMI (Body Mass Index)    94 98.1  F (36.7  C) (Oral) 16 5' 7\" (1.702 m) 97% 40.1 kg/m2       Blood Pressure from Last 3 Encounters:   07/06/17 (!) 140/120   06/29/17 (!) 150/110   05/11/17 (!) 170/118    Weight from Last 3 Encounters:   07/06/17 256 lb (116.1 kg)   06/29/17 257 lb (116.6 kg)   05/11/17 257 lb 9.6 oz (116.8 kg)              We Performed the Following     ORTHO  REFERRAL        Primary Care Provider Office Phone # Fax #    Radha JERRY [facility] [phone]       [facility] [place] [address] [facility]  [place] [address]        Equal Access to Services     [facility] Center[facility] AH: Hadii aad ku hadasho Soomaali, waaxda luqadaha, qaybta kaalmada adeegyada, waxay jd scott . So [facility] [phone].    ATENCIÓN: Si habla español, tiene a chu disposición servicios gratuitos de asistencia lingüística. Llame al [phone].    We comply with applicable federal civil rights laws and [place] laws. We do not discriminate on the basis of race, color, national origin, age, disability sex, sexual orientation or gender identity.            Thank you! "     Thank you for choosing [facility]  for your care. Our goal is always to provide you with excellent care. Hearing back from our patients is one way we can continue to improve our services. Please take a few minutes to complete the written survey that you may receive in the mail after your visit with us. Thank you!             Your Updated Medication List - Protect others around you: Learn how to safely use, store and throw away your medicines at www.disposemymeds.org.          This list is accurate as of: 7/6/17  5:02 PM.  Always use your most recent med list.                   Brand Name Dispense Instructions for use Diagnosis    amLODIPine 5 MG tablet    NORVASC    30 tablet    Take 1 tablet (5 mg) by mouth daily    HTN, goal below 140/90       ASPIRIN NOT PRESCRIBED    INTENTIONAL     Reported on 5/11/2017    Type 2 diabetes mellitus with diabetic polyneuropathy (H)       EPINEPHrine 0.3 MG/0.3ML injection     1 each    Inject 0.3 mLs (0.3 mg) into the muscle once as needed for anaphylaxis    Allergic reaction, initial encounter       hydrochlorothiazide 25 MG tablet    HYDRODIURIL    30 tablet    Take 1 tablet (25 mg) by mouth daily    HTN, goal below 140/90       lisinopril 20 MG tablet    PRINIVIL/ZESTRIL    90 tablet    Take 1 tablet (20 mg) by mouth daily    Type 2 diabetes mellitus without complication, without long-term current use of insulin (H)       metFORMIN 500 MG 24 hr tablet    GLUCOPHAGE-XR    90 tablet    Take 1 tablet (500 mg) by mouth daily (with dinner)    Type 2 diabetes mellitus without complication, without long-term current use of insulin (H)       pravastatin 20 MG tablet    PRAVACHOL    90 tablet    Take 1 tablet (20 mg) by mouth daily    Type 2 diabetes mellitus without complication, without long-term current use of insulin (H)

## 2017-07-06 NOTE — NURSING NOTE
"Chief Complaint   Patient presents with     Shoulder     Rt shoulder pain       Initial BP (!) 140/120 (BP Location: Right arm, Patient Position: Chair, Cuff Size: Adult Large)  Pulse 94  Temp 98.1  F (36.7  C) (Oral)  Resp 16  Ht 5' 7\" (1.702 m)  Wt 256 lb (116.1 kg)  SpO2 97%  BMI 40.1 kg/m2 Estimated body mass index is 40.1 kg/(m^2) as calculated from the following:    Height as of this encounter: 5' 7\" (1.702 m).    Weight as of this encounter: 256 lb (116.1 kg).  Medication Reconciliation: complete   Radha Monsivais, RADHA      "

## 2017-07-10 RX ORDER — HYDROCHLOROTHIAZIDE 12.5 MG/1
CAPSULE ORAL
Qty: 90 CAPSULE | Refills: 0 | OUTPATIENT
Start: 2017-07-10

## 2017-07-10 NOTE — TELEPHONE ENCOUNTER
Duplicate received from [facility] pharmacy - notified pharmacy there that refills were sent 6/29/17 to [facility]. Sent through as duplicate/denied

## 2017-07-24 ENCOUNTER — TELEPHONE (OUTPATIENT)
Dept: FAMILY MEDICINE | Facility: CLINIC | Age: 37
End: 2017-07-24

## 2017-07-24 NOTE — TELEPHONE ENCOUNTER
Panel Management Review      Patient has the following on his problem list:     Diabetes    ASA: Passed    Last A1C  Lab Results   Component Value Date    A1C 6.4 06/29/2017    A1C 6.5 02/13/2017    A1C 6.4 11/11/2016    A1C 7.2 03/14/2016    A1C 8.6 09/29/2015     A1C tested: Passed    Last LDL:    Lab Results   Component Value Date    CHOL 153 06/29/2017     Lab Results   Component Value Date    HDL 37 06/29/2017     Lab Results   Component Value Date     06/29/2017     Lab Results   Component Value Date    TRIG 70 06/29/2017     Lab Results   Component Value Date    CHOLHDLRATIO 5.6 09/29/2015     Lab Results   Component Value Date    NHDL 116 06/29/2017       Is the patient on a Statin? YES             Is the patient on Aspirin? NO    Medications     HMG CoA Reductase Inhibitors    pravastatin (PRAVACHOL) 20 MG tablet          Last three blood pressure readings:  BP Readings from Last 3 Encounters:   07/06/17 (!) 140/120   06/29/17 (!) 150/110   05/11/17 (!) 170/118       Date of last diabetes office visit: 6/29/17     Tobacco History:     History   Smoking Status     Never Smoker   Smokeless Tobacco     Never Used             Composite cancer screening  Chart review shows that this patient is due/due soon for the following None  Summary:    Patient is due/failing the following:   CK and Basiic Metabolic- future orders placed    Action needed:   Lab only    Type of outreach:    Sent [employer] message.    Questions for provider review:    None                                                                                                                                    Amanda MARQUEZ M.A.       Chart routed to Care Team .

## 2017-11-27 ENCOUNTER — OFFICE VISIT (OUTPATIENT)
Dept: FAMILY MEDICINE | Facility: CLINIC | Age: 37
End: 2017-11-27
Payer: COMMERCIAL

## 2017-11-27 VITALS
BODY MASS INDEX: 40.88 KG/M2 | HEART RATE: 90 BPM | RESPIRATION RATE: 18 BRPM | WEIGHT: 261 LBS | SYSTOLIC BLOOD PRESSURE: 180 MMHG | DIASTOLIC BLOOD PRESSURE: 128 MMHG | TEMPERATURE: 97.8 F

## 2017-11-27 DIAGNOSIS — M54.50 ACUTE LEFT-SIDED LOW BACK PAIN WITHOUT SCIATICA: Primary | ICD-10-CM

## 2017-11-27 DIAGNOSIS — Z13.6 CARDIOVASCULAR SCREENING; LDL GOAL LESS THAN 160: ICD-10-CM

## 2017-11-27 DIAGNOSIS — R35.0 URINARY FREQUENCY: ICD-10-CM

## 2017-11-27 DIAGNOSIS — I10 HTN, GOAL BELOW 140/90: ICD-10-CM

## 2017-11-27 DIAGNOSIS — Z13.89 SCREENING FOR DIABETIC PERIPHERAL NEUROPATHY: ICD-10-CM

## 2017-11-27 DIAGNOSIS — E11.9 TYPE 2 DIABETES MELLITUS WITHOUT COMPLICATION, WITHOUT LONG-TERM CURRENT USE OF INSULIN (H): ICD-10-CM

## 2017-11-27 PROBLEM — E66.01 MORBID OBESITY (H): Status: ACTIVE | Noted: 2017-11-27

## 2017-11-27 LAB
ALBUMIN UR-MCNC: NEGATIVE MG/DL
APPEARANCE UR: CLEAR
BILIRUB UR QL STRIP: NEGATIVE
COLOR UR AUTO: YELLOW
GLUCOSE UR STRIP-MCNC: NEGATIVE MG/DL
HBA1C MFR BLD: 6.5 % (ref 4.3–6)
HGB UR QL STRIP: NEGATIVE
KETONES UR STRIP-MCNC: NEGATIVE MG/DL
LEUKOCYTE ESTERASE UR QL STRIP: ABNORMAL
NITRATE UR QL: NEGATIVE
PH UR STRIP: 7 PH (ref 5–7)
RBC #/AREA URNS AUTO: NORMAL /HPF
SOURCE: ABNORMAL
SP GR UR STRIP: 1.02 (ref 1–1.03)
UROBILINOGEN UR STRIP-ACNC: 0.2 EU/DL (ref 0.2–1)
WBC #/AREA URNS AUTO: NORMAL /HPF

## 2017-11-27 PROCEDURE — 99207 C FOOT EXAM  NO CHARGE: CPT | Performed by: NURSE PRACTITIONER

## 2017-11-27 PROCEDURE — 81001 URINALYSIS AUTO W/SCOPE: CPT | Performed by: NURSE PRACTITIONER

## 2017-11-27 PROCEDURE — 99214 OFFICE O/P EST MOD 30 MIN: CPT | Performed by: NURSE PRACTITIONER

## 2017-11-27 PROCEDURE — 36415 COLL VENOUS BLD VENIPUNCTURE: CPT | Performed by: NURSE PRACTITIONER

## 2017-11-27 PROCEDURE — 83036 HEMOGLOBIN GLYCOSYLATED A1C: CPT | Performed by: NURSE PRACTITIONER

## 2017-11-27 RX ORDER — HYDROCHLOROTHIAZIDE 25 MG/1
25 TABLET ORAL DAILY
Qty: 90 TABLET | Refills: 0 | Status: SHIPPED | OUTPATIENT
Start: 2017-11-27 | End: 2018-08-10

## 2017-11-27 RX ORDER — CYCLOBENZAPRINE HCL 5 MG
5 TABLET ORAL 3 TIMES DAILY PRN
Qty: 30 TABLET | Refills: 0 | Status: SHIPPED | OUTPATIENT
Start: 2017-11-27 | End: 2020-05-12

## 2017-11-27 RX ORDER — LISINOPRIL 20 MG/1
20 TABLET ORAL DAILY
Qty: 90 TABLET | Refills: 0 | Status: SHIPPED | OUTPATIENT
Start: 2017-11-27 | End: 2020-05-12

## 2017-11-27 RX ORDER — AMLODIPINE BESYLATE 5 MG/1
5 TABLET ORAL DAILY
Qty: 90 TABLET | Refills: 0 | Status: SHIPPED | OUTPATIENT
Start: 2017-11-27 | End: 2018-08-10

## 2017-11-27 RX ORDER — PRAVASTATIN SODIUM 20 MG
20 TABLET ORAL DAILY
Qty: 90 TABLET | Refills: 0 | Status: SHIPPED | OUTPATIENT
Start: 2017-11-27 | End: 2020-05-12

## 2017-11-27 NOTE — PROGRESS NOTES
HPI    SUBJECTIVE:   Connor Emerson is a 37 year old male who presents to clinic today for the following health issues:      Back Pain       Duration: started about 5 days ago        Specific cause: none    Description:   Location of pain: low back left  Character of pain: sharp and dull ache  Pain radiation:none  New numbness or weakness in legs, not attributed to pain:  no     Intensity: Currently 7/10, At its worst 10/10    History:   Pain interferes with job: YES, when going back to work.   History of back problems: no prior back problems  Any previous MRI or X-rays: None  Sees a specialist for back pain:  No  Therapies tried without relief: chiropractor, NSAIDs and stretch    Alleviating factors:   Improved by: none      Precipitating factors:  Worsened by: Lifting, Bending, Standing, Sitting, Lying Flat and Walking    Functional and Psychosocial Screen (Jose Alfredo STarT Back):      Most recent score:    JOSE ALFREDO START BACK TOTAL SCORE 11/27/2017   Total Score (all 9) 5         Accompanying Signs & Symptoms:  Risk of Fracture:  None  Risk of Cauda Equina:  None  Risk of Infection:  None  Risk of Cancer:  None  Risk of Ankylosing Spondylitis:  Onset at age <35, male, AND morning back stiffness. no   Unsure what caused pain to start.  Pain does not radiate. Pain more on the L side and worsens with movement.      Hyperlipidemia Follow-Up      Taking statin?  No    Other lipid medications/supplements?:  none    Hypertension Follow-up      Outpatient blood pressures are not being checked.    Low Salt Diet: regular diet    Connor reports he has been off all of his medications for the past several months.  Metformin was giving him diarrhea, so he opted to stop all of his medications.        Problem list and histories reviewed & adjusted, as indicated.  Additional history: as documented    Current Outpatient Prescriptions   Medication Sig Dispense Refill     pravastatin (PRAVACHOL) 20 MG tablet Take 1 tablet (20 mg) by mouth  daily 90 tablet 0     lisinopril (PRINIVIL/ZESTRIL) 20 MG tablet Take 1 tablet (20 mg) by mouth daily 90 tablet 0     hydrochlorothiazide (HYDRODIURIL) 25 MG tablet Take 1 tablet (25 mg) by mouth daily 90 tablet 0     amLODIPine (NORVASC) 5 MG tablet Take 1 tablet (5 mg) by mouth daily 90 tablet 0     cyclobenzaprine (FLEXERIL) 5 MG tablet Take 1 tablet (5 mg) by mouth 3 times daily as needed for muscle spasms 30 tablet 0     metFORMIN (GLUCOPHAGE-XR) 500 MG 24 hr tablet Take 1 tablet (500 mg) by mouth daily (with dinner) (Patient not taking: Reported on 11/27/2017) 90 tablet 0     EPINEPHrine (EPIPEN) 0.3 MG/0.3ML injection Inject 0.3 mLs (0.3 mg) into the muscle once as needed for anaphylaxis (Patient not taking: Reported on 11/27/2017) 1 each 0     ASPIRIN NOT PRESCRIBED, INTENTIONAL, Reported on 5/11/2017       Allergies   Allergen Reactions     Vicodin [Hydrocodone-Acetaminophen] Nausea and Vomiting and GI Disturbance       Reviewed and updated as needed this visit by clinical staff  Tobacco  Allergies  Meds  Problems  Med Hx  Soc Hx      Reviewed and updated as needed this visit by Provider  Allergies  Meds  Problems         ROS:  Constitutional, HEENT, cardiovascular, pulmonary, gi and gu systems are negative, except as otherwise noted.      OBJECTIVE:   BP (!) 180/128 (BP Location: Right arm, Cuff Size: Adult Large)  Pulse 90  Temp 97.8  F (36.6  C) (Oral)  Resp 18  Wt 261 lb (118.4 kg)  BMI 40.88 kg/m2  Body mass index is 40.88 kg/(m^2).  GENERAL: healthy, alert and no distress  EYES: Eyes grossly normal to inspection, PERRL and conjunctivae and sclerae normal  RESP: lungs clear to auscultation - no rales, rhonchi or wheezes  CV: regular rate and rhythm, normal S1 S2, no S3 or S4, no murmur, click or rub, no peripheral edema and peripheral pulses strong  MS: back: no spinous tenderness, no paraspinal muscle tenderness, FROM though notes pain with flexion and L lateral bending, bilat straight  leg raise test normal, LE DTR's intact/equal, LE strength normal  SKIN: no suspicious lesions or rashes  PSYCH: mentation appears normal, affect normal/bright  Diabetic foot exam: normal DP and PT pulses, no trophic changes or ulcerative lesions, normal sensory exam and normal monofilament exam      Diagnostic Test Results:  Results for orders placed or performed in visit on 11/27/17   *UA reflex to Microscopic and Culture ([place] [facility] (except [place] and [place])   Result Value Ref Range    Color Urine Yellow     Appearance Urine Clear     Glucose Urine Negative NEG^Negative mg/dL    Bilirubin Urine Negative NEG^Negative    Ketones Urine Negative NEG^Negative mg/dL    Specific Gravity Urine 1.020 1.003 - 1.035    Blood Urine Negative NEG^Negative    pH Urine 7.0 5.0 - 7.0 pH    Protein Albumin Urine Negative NEG^Negative mg/dL    Urobilinogen Urine 0.2 0.2 - 1.0 EU/dL    Nitrite Urine Negative NEG^Negative    Leukocyte Esterase Urine Trace (A) NEG^Negative    Source Midstream Urine    Urine Microscopic   Result Value Ref Range    WBC Urine O - 2 OTO2^O - 2 /HPF    RBC Urine O - 2 OTO2^O - 2 /HPF   Hemoglobin A1c   Result Value Ref Range    Hemoglobin A1C 6.5 (H) 4.3 - 6.0 %       ASSESSMENT/PLAN:   1. Screening for diabetic peripheral neuropathy  - C FOOT EXAM  NO CHARGE    2. Urinary frequency  No indication of infection.   - *UA reflex to Microscopic and Culture ([place] [facility] (except [place] and [place])  - Urine Microscopic    3. HTN, goal below 140/90  Has been off medications for several months.  Discussed risks of uncontrolled HTN including but not limited to stroke and death.   Restart medications today; refilled the ones he no longer has at home.  Return for BP check in 1-2 weeks.   - hydrochlorothiazide (HYDRODIURIL) 25 MG tablet; Take 1 tablet (25 mg) by mouth daily (Patient not taking: Reported on 12/8/2017)  Dispense: 90 tablet; Refill: 0  - amLODIPine (NORVASC) 5 MG  tablet; Take 1 tablet (5 mg) by mouth daily (Patient not taking: Reported on 12/8/2017)  Dispense: 90 tablet; Refill: 0    4. Type 2 diabetes mellitus without complication, without long-term current use of insulin (H)  Discussed importance of meds and risks of uncontrolled diabetes.  He stopped medications due to metformin causing him diarrhea.  Will get an A1c today and have him make return appointment to discuss medication alternatives.   - pravastatin (PRAVACHOL) 20 MG tablet; Take 1 tablet (20 mg) by mouth daily (Patient not taking: Reported on 12/8/2017)  Dispense: 90 tablet; Refill: 0  - lisinopril (PRINIVIL/ZESTRIL) 20 MG tablet; Take 1 tablet (20 mg) by mouth daily (Patient not taking: Reported on 12/8/2017)  Dispense: 90 tablet; Refill: 0  - Hemoglobin A1c  - C FOOT EXAM  NO CHARGE    5. Acute left-sided low back pain without sciatica  Rest, ice/heat, massage, and NSAIDs.  If no improvement in 2 weeks return for follow up; sooner if worsening symptoms.   - cyclobenzaprine (FLEXERIL) 5 MG tablet; Take 1 tablet (5 mg) by mouth 3 times daily as needed for muscle spasms  Dispense: 30 tablet; Refill: 0    6. CARDIOVASCULAR SCREENING; LDL GOAL LESS THAN 160  Reviewed labs from June.  Refilled.    - pravastatin (PRAVACHOL) 20 MG tablet; Take 1 tablet (20 mg) by mouth daily (Patient not taking: Reported on 12/8/2017)  Dispense: 90 tablet; Refill: 0    EJRRY Carranza [facility]      Physical Exam

## 2017-11-27 NOTE — PATIENT INSTRUCTIONS
Start taking your BP medications and cholesterol medication.  Follow up in 1 week to have your BP rechecked and discuss your diabetes as well as changing your diabetes medications.

## 2017-11-27 NOTE — MR AVS SNAPSHOT
After Visit Summary   11/27/2017    Connor Emerson    MRN: [id number]           Patient Information     Date Of Birth          1980        Visit Information        Provider Department      11/27/2017 4:00 PM Keila Shannon APRN CNP [facility]        Today's Diagnoses     Screening for diabetic peripheral neuropathy    -  1    Urinary frequency        HTN, goal below 140/90        Type 2 diabetes mellitus without complication, without long-term current use of insulin (H)        Acute left-sided low back pain without sciatica          Care Instructions    Start taking your BP medications and cholesterol medication.  Follow up in 1 week to have your BP rechecked and discuss your diabetes as well as changing your diabetes medications.            Follow-ups after your visit        Follow-up notes from your care team     Return in about 1 week (around 12/4/2017) for diabetes and recheck BP.      Who to contact     If you have questions or need follow up information about today's clinic visit or your schedule please contact [facility] directly at [phone].  Normal or non-critical lab and imaging results will be communicated to you by [employer], letter or phone within 4 business days after the clinic has received the results. If you do not hear from us within 7 days, please contact the clinic through [employer] or phone. If you have a critical or abnormal lab result, we will notify you by phone as soon as possible.  Submit refill requests through [employer] or call your pharmacy and they will forward the refill request to us. Please allow 3 business days for your refill to be completed.          Additional Information About Your Visit        [employer] Information     [employer] gives you secure access to your electronic health record. If you see a primary care provider, you can also send messages to your care team and make appointments. If you have questions, please call  your primary care clinic.  If you do not have a primary care provider, please call [phone] and they will assist you.        Care EveryWhere ID     This is your Care EveryWhere ID. This could be used by other organizations to access your [place] medical records  [id number]        Your Vitals Were     Pulse Temperature Respirations BMI (Body Mass Index)          90 97.8  F (36.6  C) (Oral) 18 40.88 kg/m2         Blood Pressure from Last 3 Encounters:   11/27/17 (!) 180/128   07/06/17 (!) 140/120   06/29/17 (!) 150/110    Weight from Last 3 Encounters:   11/27/17 261 lb (118.4 kg)   07/06/17 256 lb (116.1 kg)   06/29/17 257 lb (116.6 kg)              We Performed the Following     *UA reflex to Microscopic and Culture ([place] [facility] [place] [facility] (except [place] and [place])     Hemoglobin A1c     Urine Microscopic          Today's Medication Changes          These changes are accurate as of: 11/27/17  4:51 PM.  If you have any questions, ask your nurse or doctor.               Start taking these medicines.        Dose/Directions    cyclobenzaprine 5 MG tablet   Commonly known as:  FLEXERIL   Used for:  Acute left-sided low back pain without sciatica   Started by:  Keila Shannon APRN CNP        Dose:  5 mg   Take 1 tablet (5 mg) by mouth 3 times daily as needed for muscle spasms   Quantity:  30 tablet   Refills:  0            Where to get your medicines      These medications were sent to [facility] [address] [place] [address] [id number] [place] [address]  [id number]  [id number]    Hours:  24-hours Phone:  [phone]     amLODIPine 5 MG tablet    cyclobenzaprine 5 MG tablet    hydrochlorothiazide 25 MG tablet    lisinopril 20 MG tablet    pravastatin 20 MG tablet                Primary Care Provider Office Phone # Fax #    JERRY Alicia Ra, [phone] [id number]  [facility] [address]        Equal Access to  Services     [facility]: Hadii adolfo Bowers, waaxda luqadaha, qaybta kaalmada jorge, elodia scott . So [facility] [phone].    ATENCIÓN: Si asya olivier, tiene a chu disposición servicios gratuitos de asistencia lingüística. Llame al [phone].    We comply with applicable federal civil rights laws and [place] laws. We do not discriminate on the basis of race, color, national origin, age, disability, sex, sexual orientation, or gender identity.            Thank you!     Thank you for choosing [facility]  for your care. Our goal is always to provide you with excellent care. Hearing back from our patients is one way we can continue to improve our services. Please take a few minutes to complete the written survey that you may receive in the mail after your visit with us. Thank you!             Your Updated Medication List - Protect others around you: Learn how to safely use, store and throw away your medicines at www.disposemymeds.org.          This list is accurate as of: 11/27/17  4:51 PM.  Always use your most recent med list.                   Brand Name Dispense Instructions for use Diagnosis    amLODIPine 5 MG tablet    NORVASC    90 tablet    Take 1 tablet (5 mg) by mouth daily    HTN, goal below 140/90       ASPIRIN NOT PRESCRIBED    INTENTIONAL     Reported on 5/11/2017    Type 2 diabetes mellitus with diabetic polyneuropathy (H)       cyclobenzaprine 5 MG tablet    FLEXERIL    30 tablet    Take 1 tablet (5 mg) by mouth 3 times daily as needed for muscle spasms    Acute left-sided low back pain without sciatica       EPINEPHrine 0.3 MG/0.3ML injection 2-pack    EPIPEN/ADRENACLICK/or ANY BX GENERIC EQUIV    1 each    Inject 0.3 mLs (0.3 mg) into the muscle once as needed for anaphylaxis    Allergic reaction, initial encounter       hydrochlorothiazide 25 MG tablet    HYDRODIURIL    90 tablet    Take 1 tablet (25 mg) by mouth daily    HTN, goal  below 140/90       lisinopril 20 MG tablet    PRINIVIL/ZESTRIL    90 tablet    Take 1 tablet (20 mg) by mouth daily    Type 2 diabetes mellitus without complication, without long-term current use of insulin (H)       metFORMIN 500 MG 24 hr tablet    GLUCOPHAGE-XR    90 tablet    Take 1 tablet (500 mg) by mouth daily (with dinner)    Type 2 diabetes mellitus without complication, without long-term current use of insulin (H)       pravastatin 20 MG tablet    PRAVACHOL    90 tablet    Take 1 tablet (20 mg) by mouth daily    Type 2 diabetes mellitus without complication, without long-term current use of insulin (H)

## 2017-12-04 ENCOUNTER — TELEPHONE (OUTPATIENT)
Dept: FAMILY MEDICINE | Facility: CLINIC | Age: 37
End: 2017-12-04

## 2017-12-04 NOTE — TELEPHONE ENCOUNTER
Panel Management Review      Patient has the following on his problem list:     Diabetes    ASA: Passed    Last A1C  Lab Results   Component Value Date    A1C 6.5 11/27/2017    A1C 6.4 06/29/2017    A1C 6.5 02/13/2017    A1C 6.4 11/11/2016    A1C 7.2 03/14/2016     A1C tested: Passed    Last LDL:    Lab Results   Component Value Date    CHOL 153 06/29/2017     Lab Results   Component Value Date    HDL 37 06/29/2017     Lab Results   Component Value Date     06/29/2017     Lab Results   Component Value Date    TRIG 70 06/29/2017     Lab Results   Component Value Date    CHOLHDLRATIO 5.6 09/29/2015     Lab Results   Component Value Date    NHDL 116 06/29/2017       Is the patient on a Statin? YES             Is the patient on Aspirin? YES    Medications     HMG CoA Reductase Inhibitors    pravastatin (PRAVACHOL) 20 MG tablet          Last three blood pressure readings:  BP Readings from Last 3 Encounters:   11/27/17 (!) 180/128   07/06/17 (!) 140/120   06/29/17 (!) 150/110       Date of last diabetes office visit: 5/11/17     Tobacco History:     History   Smoking Status     Never Smoker   Smokeless Tobacco     Never Used             Composite cancer screening  Chart review shows that this patient is due/due soon for the following None  Summary:    Patient is due/failing the following:   None    Action needed:   Patient needs office visit for Diabetes. Blood work completed on 11/27/17.    Type of outreach:    None    Questions for provider review:    None                                                                                                                                    Amanda MARQUEZ M.A.       Chart routed to Care Team .

## 2018-01-04 ENCOUNTER — TELEPHONE (OUTPATIENT)
Dept: FAMILY MEDICINE | Facility: CLINIC | Age: 38
End: 2018-01-04

## 2018-01-04 NOTE — TELEPHONE ENCOUNTER
Panel Management Review      Patient has the following on his problem list:     Diabetes    ASA: Passed    Last A1C  Lab Results   Component Value Date    A1C 6.5 11/27/2017    A1C 6.4 06/29/2017    A1C 6.5 02/13/2017    A1C 6.4 11/11/2016    A1C 7.2 03/14/2016     A1C tested: Passed    Last LDL:    Lab Results   Component Value Date    CHOL 153 06/29/2017     Lab Results   Component Value Date    HDL 37 06/29/2017     Lab Results   Component Value Date     06/29/2017     Lab Results   Component Value Date    TRIG 70 06/29/2017     Lab Results   Component Value Date    CHOLHDLRATIO 5.6 09/29/2015     Lab Results   Component Value Date    NHDL 116 06/29/2017       Is the patient on a Statin? YES             Is the patient on Aspirin? NO    Medications     HMG CoA Reductase Inhibitors    pravastatin (PRAVACHOL) 20 MG tablet          Last three blood pressure readings:  BP Readings from Last 3 Encounters:   11/27/17 (!) 180/128   07/06/17 (!) 140/120   06/29/17 (!) 150/110       Date of last diabetes office visit: 6/29/17 but had labs drawn on 11/27/17- A1C     Tobacco History:     History   Smoking Status     Never Smoker   Smokeless Tobacco     Never Used             Composite cancer screening  Chart review shows that this patient is due/due soon for the following None  Summary:    Patient is due/failing the following:   BP CHECK and FOLLOW UP    Action needed:   Patient needs office visit for Diabetic check, BP.    Type of outreach:    Phone, spoke to patient.  Scheduled for 1/8/18    Questions for provider review:    None                                                                                                                                    Amanda MARQUEZ M.A.       Chart routed to Care Team .

## 2018-02-02 ENCOUNTER — OFFICE VISIT (OUTPATIENT)
Dept: FAMILY MEDICINE | Facility: CLINIC | Age: 38
End: 2018-02-02
Payer: COMMERCIAL

## 2018-02-02 VITALS
DIASTOLIC BLOOD PRESSURE: 110 MMHG | OXYGEN SATURATION: 98 % | HEART RATE: 92 BPM | TEMPERATURE: 98.4 F | BODY MASS INDEX: 40.63 KG/M2 | WEIGHT: 259.4 LBS | SYSTOLIC BLOOD PRESSURE: 198 MMHG

## 2018-02-02 DIAGNOSIS — E11.9 TYPE 2 DIABETES MELLITUS WITHOUT COMPLICATION, WITHOUT LONG-TERM CURRENT USE OF INSULIN (H): ICD-10-CM

## 2018-02-02 DIAGNOSIS — F43.22 ADJUSTMENT DISORDER WITH ANXIOUS MOOD: ICD-10-CM

## 2018-02-02 DIAGNOSIS — I10 ESSENTIAL HYPERTENSION: Primary | ICD-10-CM

## 2018-02-02 PROCEDURE — 36415 COLL VENOUS BLD VENIPUNCTURE: CPT | Performed by: NURSE PRACTITIONER

## 2018-02-02 PROCEDURE — 80053 COMPREHEN METABOLIC PANEL: CPT | Performed by: NURSE PRACTITIONER

## 2018-02-02 PROCEDURE — 99214 OFFICE O/P EST MOD 30 MIN: CPT | Performed by: NURSE PRACTITIONER

## 2018-02-02 NOTE — PROGRESS NOTES
SUBJECTIVE:   Connor Emerson is a 37 year old male who presents to clinic today for the following health issues:      Diabetes Follow-up      Patient is checking blood sugars: not at all    Diabetic concerns: None     Symptoms of hypoglycemia (low blood sugar): none     Paresthesias (numbness or burning in feet) or sores: No     Date of last diabetic eye exam: Margos Best [facility]icyril  On 1/24/18    BP Readings from Last 2 Encounters:   11/27/17 (!) 180/128   07/06/17 (!) 140/120     Hemoglobin A1C (%)   Date Value   11/27/2017 6.5 (H)   06/29/2017 6.4 (H)     LDL Cholesterol Calculated (mg/dL)   Date Value   06/29/2017 102 (H)   11/11/2016 110 (H)     Hypertension Follow-up      Outpatient blood pressures are not being checked.    Low Salt Diet: no added salt      Amount of exercise or physical activity: Physical job    Problems taking medications regularly: Yes,  Stopped all medication    Medication side effects: none    Diet: regular (no restrictions)    Pt presents with requests for follow up.  He has not taken any of his medications for the past several months.  He is not having any chest pain.  He does note occasional sob and racing heart but attributes this to anxiety.  He reports feeling quite a bit of anxiety and having difficulty focusing on the task at hand because he is always concerned about what else is needed.  He is eating and drinking without problem.  No GI symptoms, no blurred vision, no headaches.  When asked why he is not taking his medications he does not really have an answer.  His wife prompted the visit today.  He is aware of the importance of his medications and how much better he feels when he is taking them.  He is interested in restarting prozac today as well.  Tolerated well in the past and felt that this helped.    Problem list and histories reviewed & adjusted, as indicated.  Additional history: as documented    Patient Active Problem List   Diagnosis     CARDIOVASCULAR SCREENING;  LDL GOAL LESS THAN 160     Low back pain     Insomnia     Obesity     HTN, goal below 140/90     Anxiety     GERD (gastroesophageal reflux disease)     Atypical chest pain     Intractable chronic migraine without aura     Type 2 diabetes mellitus without complication (H)     Adjustment disorder with mixed anxiety and depressed mood     Lateral epicondylitis of right elbow     Cellulitis of hand, right     Morbid obesity (H)     Past Surgical History:   Procedure Laterality Date     INJECT BLOCK MEDIAL BRANCH CERVICAL/THORACIC/LUMBAR       ORTHOPEDIC SURGERY      Ganesh. Rotator cuff repair.       Social History   Substance Use Topics     Smoking status: Never Smoker     Smokeless tobacco: Never Used     Alcohol use 0.0 oz/week     0 Standard drinks or equivalent per week      Comment: social     Family History   Problem Relation Age of Onset     CANCER Mother      uterine     Family History Negative Father      CANCER Maternal Uncle      melanoma           Reviewed and updated as needed this visit by clinical staff  Tobacco  Allergies  Meds  Med Hx  Surg Hx  Fam Hx  Soc Hx      Reviewed and updated as needed this visit by Provider         ROS:  SEE HPI.    OBJECTIVE:     BP (!) 198/110  Pulse 92  Temp 98.4  F (36.9  C) (Oral)  Wt 259 lb 6.4 oz (117.7 kg)  SpO2 98%  BMI 40.63 kg/m2  Body mass index is 40.63 kg/(m^2).  GENERAL: healthy, alert and no distress  NECK: no adenopathy, no asymmetry, masses, or scars and thyroid normal to palpation  RESP: lungs clear to auscultation - no rales, rhonchi or wheezes  CV: regular rate and rhythm, normal S1 S2, no S3 or S4, no murmur, click or rub, no peripheral edema and peripheral pulses strong  ABDOMEN: soft, nontender, bowel sounds normal and obese.  PSYCH: mentation appears normal, affect normal/bright    Diagnostic Test Results:  Results for orders placed or performed in visit on 02/02/18   Comprehensive metabolic panel   Result Value Ref Range    Sodium 143 133 -  144 mmol/L    Potassium 3.7 3.4 - 5.3 mmol/L    Chloride 108 94 - 109 mmol/L    Carbon Dioxide 26 20 - 32 mmol/L    Anion Gap 9 3 - 14 mmol/L    Glucose 177 (H) 70 - 99 mg/dL    Urea Nitrogen 15 7 - 30 mg/dL    Creatinine 0.66 0.66 - 1.25 mg/dL    GFR Estimate >90 >60 mL/min/1.7m2    GFR Estimate If Black >90 >60 mL/min/1.7m2    Calcium 9.2 8.5 - 10.1 mg/dL    Bilirubin Total 0.5 0.2 - 1.3 mg/dL    Albumin 4.0 3.4 - 5.0 g/dL    Protein Total 7.0 6.8 - 8.8 g/dL    Alkaline Phosphatase 97 40 - 150 U/L    ALT 37 0 - 70 U/L    AST 17 0 - 45 U/L       ASSESSMENT/PLAN:   1. Essential hypertension  Poorly controlled.  Discussed in depth concerns with his BP and poor compliance.  Pt is going to restart his medications now (has all of them at home) and return to see me in 1 week.  Appt was scheduled for the pt.  - Comprehensive metabolic panel    2. Adjustment disorder with anxious mood  Tolerated well in the past.  Restart now.  - FLUoxetine (PROZAC) 20 MG capsule; Take 1 capsule (20 mg) by mouth daily  Dispense: 30 capsule; Refill: 1    3. Type 2 diabetes mellitus without complication, without long-term current use of insulin (H)  Recent a1c with good control.  Continues to manage with diet.  Needs to restart medications now but will hold off on metformin as this has caused some GI upset in the past.  Control over the past few months has been without medication and has been stable.    JERRY Alicia Ra, CNP  [facility]

## 2018-02-02 NOTE — PATIENT INSTRUCTIONS
Restart your medications now.  If needed, you can wait and start your metformin after a few weeks.  See me next week.

## 2018-02-02 NOTE — NURSING NOTE
"Chief Complaint   Patient presents with     Hypertension       Initial BP (!) 198/110  Pulse 92  Temp 98.4  F (36.9  C) (Oral)  Wt 259 lb 6.4 oz (117.7 kg)  SpO2 98%  BMI 40.63 kg/m2 Estimated body mass index is 40.63 kg/(m^2) as calculated from the following:    Height as of 7/6/17: 5' 7\" (1.702 m).    Weight as of this encounter: 259 lb 6.4 oz (117.7 kg).  Medication Reconciliation: complete   Amanda MARQUEZ M.A.      "

## 2018-02-02 NOTE — MR AVS SNAPSHOT
After Visit Summary   2/2/2018    Connor Emerson    MRN: [id number]           Patient Information     Date Of Birth          1980        Visit Information        Provider Department      2/2/2018 2:40 PM Radha Shetty Ra, APRN CNP [facility]        Today's Diagnoses     Essential hypertension    -  1      Care Instructions    Restart your medications now.  If needed, you can wait and start your metformin after a few weeks.  See me next week.          Follow-ups after your visit        Who to contact     If you have questions or need follow up information about today's clinic visit or your schedule please contact [facility] directly at [phone].  Normal or non-critical lab and imaging results will be communicated to you by MyChart, letter or phone within 4 business days after the clinic has received the results. If you do not hear from us within 7 days, please contact the clinic through vSocialhart or phone. If you have a critical or abnormal lab result, we will notify you by phone as soon as possible.  Submit refill requests through CompassMD or call your pharmacy and they will forward the refill request to us. Please allow 3 business days for your refill to be completed.          Additional Information About Your Visit        MyChart Information     CompassMD gives you secure access to your electronic health record. If you see a primary care provider, you can also send messages to your care team and make appointments. If you have questions, please call your primary care clinic.  If you do not have a primary care provider, please call [phone] and they will assist you.        Care EveryWhere ID     This is your Care EveryWhere ID. This could be used by other organizations to access your [facility] medical records  [id number]        Your Vitals Were     Pulse Temperature Pulse Oximetry BMI (Body Mass Index)          92 98.4  F (36.9  C) (Oral) 98% 40.63 kg/m2          Blood Pressure from Last 3 Encounters:   02/02/18 (!) 198/110   11/27/17 (!) 180/128   07/06/17 (!) 140/120    Weight from Last 3 Encounters:   02/02/18 259 lb 6.4 oz (117.7 kg)   11/27/17 261 lb (118.4 kg)   07/06/17 256 lb (116.1 kg)              We Performed the Following     Comprehensive metabolic panel        Primary Care Provider Office Phone # Fax #    Radha Shetty, APRN [facility] [phone]       [address] [facility] [address]        Equal Access to Services     [facility]: Hadii aad ku hadasho Soomaali, waaxda luqadaha, qaybta kaalmada adeegyada, waxmichel miles hayaan adeflako scott . So [facility] [phone].    ATENCIÓN: Si habla español, tiene a chu disposición servicios gratuitos de asistencia lingüística. Llame al [phone].    We comply with applicable federal civil rights laws and [place] laws. We do not discriminate on the basis of race, color, national origin, age, disability, sex, sexual orientation, or gender identity.            Thank you!     Thank you for choosing [facility]  for your care. Our goal is always to provide you with excellent care. Hearing back from our patients is one way we can continue to improve our services. Please take a few minutes to complete the written survey that you may receive in the mail after your visit with us. Thank you!             Your Updated Medication List - Protect others around you: Learn how to safely use, store and throw away your medicines at www.disposemymeds.org.          This list is accurate as of 2/2/18  3:19 PM.  Always use your most recent med list.                   Brand Name Dispense Instructions for use Diagnosis    amLODIPine 5 MG tablet    NORVASC    90 tablet    Take 1 tablet (5 mg) by mouth daily    HTN, goal below 140/90       ASPIRIN NOT PRESCRIBED    INTENTIONAL     Reported on 5/11/2017    Type 2 diabetes mellitus with diabetic polyneuropathy (H)       cyclobenzaprine 5 MG tablet     FLEXERIL    30 tablet    Take 1 tablet (5 mg) by mouth 3 times daily as needed for muscle spasms    Acute left-sided low back pain without sciatica       EPINEPHrine 0.3 MG/0.3ML injection 2-pack    EPIPEN/ADRENACLICK/or ANY BX GENERIC EQUIV    1 each    Inject 0.3 mLs (0.3 mg) into the muscle once as needed for anaphylaxis    Allergic reaction, initial encounter       hydrochlorothiazide 25 MG tablet    HYDRODIURIL    90 tablet    Take 1 tablet (25 mg) by mouth daily    HTN, goal below 140/90       lisinopril 20 MG tablet    PRINIVIL/ZESTRIL    90 tablet    Take 1 tablet (20 mg) by mouth daily    Type 2 diabetes mellitus without complication, without long-term current use of insulin (H)       metFORMIN 500 MG 24 hr tablet    GLUCOPHAGE-XR    90 tablet    Take 1 tablet (500 mg) by mouth daily (with dinner)    Type 2 diabetes mellitus without complication, without long-term current use of insulin (H)       pravastatin 20 MG tablet    PRAVACHOL    90 tablet    Take 1 tablet (20 mg) by mouth daily    Type 2 diabetes mellitus without complication, without long-term current use of insulin (H)

## 2018-02-03 LAB
ALBUMIN SERPL-MCNC: 4 G/DL (ref 3.4–5)
ALP SERPL-CCNC: 97 U/L (ref 40–150)
ALT SERPL W P-5'-P-CCNC: 37 U/L (ref 0–70)
ANION GAP SERPL CALCULATED.3IONS-SCNC: 9 MMOL/L (ref 3–14)
AST SERPL W P-5'-P-CCNC: 17 U/L (ref 0–45)
BILIRUB SERPL-MCNC: 0.5 MG/DL (ref 0.2–1.3)
BUN SERPL-MCNC: 15 MG/DL (ref 7–30)
CALCIUM SERPL-MCNC: 9.2 MG/DL (ref 8.5–10.1)
CHLORIDE SERPL-SCNC: 108 MMOL/L (ref 94–109)
CO2 SERPL-SCNC: 26 MMOL/L (ref 20–32)
CREAT SERPL-MCNC: 0.66 MG/DL (ref 0.66–1.25)
GFR SERPL CREATININE-BSD FRML MDRD: >90 ML/MIN/1.7M2
GLUCOSE SERPL-MCNC: 177 MG/DL (ref 70–99)
POTASSIUM SERPL-SCNC: 3.7 MMOL/L (ref 3.4–5.3)
PROT SERPL-MCNC: 7 G/DL (ref 6.8–8.8)
SODIUM SERPL-SCNC: 143 MMOL/L (ref 133–144)

## 2018-02-09 ENCOUNTER — OFFICE VISIT (OUTPATIENT)
Dept: FAMILY MEDICINE | Facility: CLINIC | Age: 38
End: 2018-02-09
Payer: COMMERCIAL

## 2018-02-09 VITALS
TEMPERATURE: 98.7 F | WEIGHT: 258.6 LBS | DIASTOLIC BLOOD PRESSURE: 102 MMHG | HEART RATE: 88 BPM | SYSTOLIC BLOOD PRESSURE: 152 MMHG | BODY MASS INDEX: 40.5 KG/M2 | OXYGEN SATURATION: 96 %

## 2018-02-09 DIAGNOSIS — I10 HTN, GOAL BELOW 140/90: ICD-10-CM

## 2018-02-09 DIAGNOSIS — F43.22 ADJUSTMENT DISORDER WITH ANXIOUS MOOD: ICD-10-CM

## 2018-02-09 DIAGNOSIS — E11.9 TYPE 2 DIABETES MELLITUS WITHOUT COMPLICATION, WITHOUT LONG-TERM CURRENT USE OF INSULIN (H): Primary | ICD-10-CM

## 2018-02-09 PROCEDURE — 99214 OFFICE O/P EST MOD 30 MIN: CPT | Performed by: NURSE PRACTITIONER

## 2018-02-09 NOTE — PROGRESS NOTES
SUBJECTIVE:   Connor Emerson is a 37 year old male who presents to clinic today for the following health issues:      Hypertension Follow-up      Outpatient blood pressures are not being checked.    Low Salt Diet: no added salt      Amount of exercise or physical activity: Trying to walk more, stairs, cut down on pop    Problems taking medications regularly: No    Medication side effects: ? Appetite down    Diet: regular (no restrictions)    Pt presents with requests for follow up.  He was recently restarted on his medications for htn, lipids.  He has been taking as directed.  Feeling fatigued.  He also made major diet changes this week, no red bull, no rockstar.  His appetite is down, but this is typical when he makes these changes.  He denies chest pain, palpitations, sob.  He was not able to  the prozac last week, pharmacy didn't have it.    Problem list and histories reviewed & adjusted, as indicated.  Additional history: as documented    Patient Active Problem List   Diagnosis     CARDIOVASCULAR SCREENING; LDL GOAL LESS THAN 160     Low back pain     Insomnia     Obesity     HTN, goal below 140/90     Anxiety     GERD (gastroesophageal reflux disease)     Atypical chest pain     Intractable chronic migraine without aura     Type 2 diabetes mellitus without complication (H)     Adjustment disorder with mixed anxiety and depressed mood     Lateral epicondylitis of right elbow     Cellulitis of hand, right     Morbid obesity (H)     Past Surgical History:   Procedure Laterality Date     INJECT BLOCK MEDIAL BRANCH CERVICAL/THORACIC/LUMBAR       ORTHOPEDIC SURGERY      Ganesh. Rotator cuff repair.       Social History   Substance Use Topics     Smoking status: Never Smoker     Smokeless tobacco: Never Used     Alcohol use 0.0 oz/week     0 Standard drinks or equivalent per week      Comment: social     Family History   Problem Relation Age of Onset     CANCER Mother      uterine     Family History Negative  Father      CANCER Maternal Uncle      melanoma           Reviewed and updated as needed this visit by clinical staff  Tobacco  Allergies  Meds  Med Hx  Surg Hx  Fam Hx  Soc Hx      Reviewed and updated as needed this visit by Provider         ROS:  SEE HPI.    OBJECTIVE:     BP (!) 152/102  Pulse 88  Temp 98.7  F (37.1  C) (Oral)  Wt 258 lb 9.6 oz (117.3 kg)  SpO2 96%  BMI 40.5 kg/m2  Body mass index is 40.5 kg/(m^2).  GENERAL: healthy, alert and no distress  NECK: no adenopathy, no asymmetry, masses, or scars and thyroid normal to palpation  RESP: lungs clear to auscultation - no rales, rhonchi or wheezes  CV: regular rate and rhythm, normal S1 S2, no S3 or S4, no murmur, click or rub, no peripheral edema and peripheral pulses strong  PSYCH: mentation appears normal, affect normal/bright    Diagnostic Test Results:  Results for orders placed or performed in visit on 02/02/18   Comprehensive metabolic panel   Result Value Ref Range    Sodium 143 133 - 144 mmol/L    Potassium 3.7 3.4 - 5.3 mmol/L    Chloride 108 94 - 109 mmol/L    Carbon Dioxide 26 20 - 32 mmol/L    Anion Gap 9 3 - 14 mmol/L    Glucose 177 (H) 70 - 99 mg/dL    Urea Nitrogen 15 7 - 30 mg/dL    Creatinine 0.66 0.66 - 1.25 mg/dL    GFR Estimate >90 >60 mL/min/1.7m2    GFR Estimate If Black >90 >60 mL/min/1.7m2    Calcium 9.2 8.5 - 10.1 mg/dL    Bilirubin Total 0.5 0.2 - 1.3 mg/dL    Albumin 4.0 3.4 - 5.0 g/dL    Protein Total 7.0 6.8 - 8.8 g/dL    Alkaline Phosphatase 97 40 - 150 U/L    ALT 37 0 - 70 U/L    AST 17 0 - 45 U/L       ASSESSMENT/PLAN:   1. Adjustment disorder with anxious mood  Start prozac now, tolerated well in the past.  Reviewed r/b/se.  Pt agrees with plan and verbalized understanding.  - FLUoxetine (PROZAC) 20 MG capsule; Take 1 capsule (20 mg) by mouth daily  Dispense: 30 capsule; Refill: 1    2. Type 2 diabetes mellitus without complication, without long-term current use of insulin (H)  Add aspirin to regimen.  A1c well  controlled.  Continue with diet control.  - aspirin 81 MG EC tablet; Take 1 tablet (81 mg) by mouth daily  Dispense: 90 tablet; Refill: 3    3.  HTN  Improvement seen this week.  Continue with meds, energy should improve over time.  See me in 1 month.    JERRY Alicia Ra [facility]

## 2018-02-09 NOTE — PATIENT INSTRUCTIONS
See me in 1 month.    Continue with your current medications, add a baby aspirin and the prozac.    Schedule for an adhd assessment, you can cancel this if you feel it is not needed.

## 2018-02-09 NOTE — MR AVS SNAPSHOT
After Visit Summary   2/9/2018    Connor Emerson    MRN: [id number]           Patient Information     Date Of Birth          1980        Visit Information        Provider Department      2/9/2018 2:40 PM Radha Shetty Ra, APRN CNP [facility]        Today's Diagnoses     Type 2 diabetes mellitus without complication, without long-term current use of insulin (H)    -  1    Adjustment disorder with anxious mood          Care Instructions    See me in 1 month.    Continue with your current medications, add a baby aspirin and the prozac.    Schedule for an adhd assessment, you can cancel this if you feel it is not needed.          Follow-ups after your visit        Who to contact     If you have questions or need follow up information about today's clinic visit or your schedule please contact [facility] directly at [phone].  Normal or non-critical lab and imaging results will be communicated to you by Nykaahart, letter or phone within 4 business days after the clinic has received the results. If you do not hear from us within 7 days, please contact the clinic through Nykaahart or phone. If you have a critical or abnormal lab result, we will notify you by phone as soon as possible.  Submit refill requests through eGames or call your pharmacy and they will forward the refill request to us. Please allow 3 business days for your refill to be completed.          Additional Information About Your Visit        [email or web address] Information     eGames gives you secure access to your electronic health record. If you see a primary care provider, you can also send messages to your care team and make appointments. If you have questions, please call your primary care clinic.  If you do not have a primary care provider, please call [phone] and they will assist you.        Care EveryWhere ID     This is your Care EveryWhere ID. This could be used by other organizations to access your  [place] medical records  [id number]        Your Vitals Were     Pulse Temperature Pulse Oximetry BMI (Body Mass Index)          88 98.7  F (37.1  C) (Oral) 96% 40.5 kg/m2         Blood Pressure from Last 3 Encounters:   02/09/18 (!) 152/102   02/02/18 (!) 198/110   11/27/17 (!) 180/128    Weight from Last 3 Encounters:   02/09/18 258 lb 9.6 oz (117.3 kg)   02/02/18 259 lb 6.4 oz (117.7 kg)   11/27/17 261 lb (118.4 kg)              Today, you had the following     No orders found for display         Today's Medication Changes          These changes are accurate as of 2/9/18  3:17 PM.  If you have any questions, ask your nurse or doctor.               Start taking these medicines.        Dose/Directions    aspirin 81 MG EC tablet   Used for:  Type 2 diabetes mellitus without complication, without long-term current use of insulin (H)   Started by:  Radha Shetty Ra, APRN CNP        Dose:  81 mg   Take 1 tablet (81 mg) by mouth daily   Quantity:  90 tablet   Refills:  3         Stop taking these medicines if you haven't already. Please contact your care team if you have questions.     ASPIRIN NOT PRESCRIBED   Commonly known as:  INTENTIONAL   Stopped by:  Radha Shetty Ra, APRN CNP           metFORMIN 500 MG 24 hr tablet   Commonly known as:  GLUCOPHAGE-XR   Stopped by:  Radha Shetty Ra, APRN CNP                Where to get your medicines      These medications were sent to [facility] [address] [place] [address]  [id number] [place] [id number]    Hours:  24-hours Phone:  [phone]     aspirin 81 MG EC tablet    FLUoxetine 20 MG capsule                Primary Care Provider Office Phone # Fax #    JERRY Alicia Ra, [phone] [address] [facility] [address] [facility]  [facility] [address]        Equal Access to Services     [facility]: Hadii aad ku hadasho Soomaali, waaxda luqadaha, qaybta kaalmada adeegyada, elodia miles  sara acostagreg laroslynhaydee ah. So [facility] [phone].    ATENCIÓN: Si lissala soy, tiene a chu disposición servicios gratuitos de asistencia lingüística. Stephanie anaya [phone].    We comply with applicable federal civil rights laws and [place] laws. We do not discriminate on the basis of race, color, national origin, age, disability, sex, sexual orientation, or gender identity.            Thank you!     Thank you for choosing [facility]  for your care. Our goal is always to provide you with excellent care. Hearing back from our patients is one way we can continue to improve our services. Please take a few minutes to complete the written survey that you may receive in the mail after your visit with us. Thank you!             Your Updated Medication List - Protect others around you: Learn how to safely use, store and throw away your medicines at www.disposemymeds.org.          This list is accurate as of 2/9/18  3:17 PM.  Always use your most recent med list.                   Brand Name Dispense Instructions for use Diagnosis    amLODIPine 5 MG tablet    NORVASC    90 tablet    Take 1 tablet (5 mg) by mouth daily    HTN, goal below 140/90       aspirin 81 MG EC tablet     90 tablet    Take 1 tablet (81 mg) by mouth daily    Type 2 diabetes mellitus without complication, without long-term current use of insulin (H)       cyclobenzaprine 5 MG tablet    FLEXERIL    30 tablet    Take 1 tablet (5 mg) by mouth 3 times daily as needed for muscle spasms    Acute left-sided low back pain without sciatica       EPINEPHrine 0.3 MG/0.3ML injection 2-pack    EPIPEN/ADRENACLICK/or ANY BX GENERIC EQUIV    1 each    Inject 0.3 mLs (0.3 mg) into the muscle once as needed for anaphylaxis    Allergic reaction, initial encounter       FLUoxetine 20 MG capsule    PROzac    30 capsule    Take 1 capsule (20 mg) by mouth daily    Adjustment disorder with anxious mood       hydrochlorothiazide 25 MG tablet    HYDRODIURIL     90 tablet    Take 1 tablet (25 mg) by mouth daily    HTN, goal below 140/90       lisinopril 20 MG tablet    PRINIVIL/ZESTRIL    90 tablet    Take 1 tablet (20 mg) by mouth daily    Type 2 diabetes mellitus without complication, without long-term current use of insulin (H)       pravastatin 20 MG tablet    PRAVACHOL    90 tablet    Take 1 tablet (20 mg) by mouth daily    Type 2 diabetes mellitus without complication, without long-term current use of insulin (H)

## 2018-02-09 NOTE — NURSING NOTE
"Chief Complaint   Patient presents with     Hypertension       Initial BP (!) 152/102  Pulse 88  Temp 98.7  F (37.1  C) (Oral)  Wt 258 lb 9.6 oz (117.3 kg)  SpO2 96%  BMI 40.5 kg/m2 Estimated body mass index is 40.5 kg/(m^2) as calculated from the following:    Height as of 7/6/17: 5' 7\" (1.702 m).    Weight as of this encounter: 258 lb 9.6 oz (117.3 kg).  Medication Reconciliation: complete   Amanda MARQUEZ M.A.      "

## 2018-02-12 ENCOUNTER — MYC MEDICAL ADVICE (OUTPATIENT)
Dept: FAMILY MEDICINE | Facility: CLINIC | Age: 38
End: 2018-02-12

## 2018-04-16 ENCOUNTER — TELEPHONE (OUTPATIENT)
Dept: FAMILY MEDICINE | Facility: CLINIC | Age: 38
End: 2018-04-16

## 2018-04-16 NOTE — TELEPHONE ENCOUNTER
Panel Management Review      Patient has the following on his problem list:     Hypertension   Last three blood pressure readings:  BP Readings from Last 3 Encounters:   02/09/18 (!) 152/102   02/02/18 (!) 198/110   11/27/17 (!) 180/128     Blood pressure: FAILED    HTN Guidelines:  Age 18-59 BP range:  Less than 140/90  Age 60-85 with Diabetes:  Less than 140/90  Age 60-85 without Diabetes:  less than 150/90      Composite cancer screening  Chart review shows that this patient is due/due soon for the following None  Summary:    Patient is due/failing the following:   BP CHECK    Action needed:   Patient needs office visit for BP follow up.    Type of outreach:    Sent ShutterCal message.    Questions for provider review:    None                                                                                                                                    Ester Morrow CMA       Chart routed to Care Team .

## 2018-04-16 NOTE — LETTER
[facility]  [address] [facility] [id number] [phone]       April 30, 2018    Connor Emerson  [address] [facility]    [place] [address]    Dear Connor,    We care about your health and have reviewed your health plan and are making recommendations based on this review, to optimize your health.    You are in particular need of attention regarding:  -Blood Pressure (your goal is <150/90) BP Readings from Last 2 Encounters:   02/09/18 (!) 152/102   02/02/18 (!) 198/110        We are recommending that you:  -schedule a follow-up apt for blood pressure    In addition, here is a list of due or overdue Health Maintenance reminders.    Health Maintenance Due   Topic Date Due     HIV SCREEN (SYSTEM ASSIGNED)  05/11/1996     Flu Vaccine (1) 09/01/2017     Eye Exam - yearly  09/28/2017     A1C (Diabetes) Lab - every 6 months  05/27/2018       To address the above recommendations, we encourage you to contact us at [phone], via [employer] or by contacting Central Scheduling toll free at [phone] 24 hours a day. They will assist you with finding the most convenient time and location.    Thank you for trusting [facility] and we appreciate the opportunity to serve you.  We look forward to supporting your healthcare needs in the future.    Healthy Regards,    Your [facility] Team

## 2018-08-01 ENCOUNTER — TELEPHONE (OUTPATIENT)
Dept: FAMILY MEDICINE | Facility: CLINIC | Age: 38
End: 2018-08-01

## 2018-08-01 NOTE — TELEPHONE ENCOUNTER
Panel Management Review      Patient has the following on his problem list:     Diabetes    ASA: Failed    Last A1C  Lab Results   Component Value Date    A1C 6.5 11/27/2017    A1C 6.4 06/29/2017    A1C 6.5 02/13/2017    A1C 6.4 11/11/2016    A1C 7.2 03/14/2016     A1C tested: FAILED    Last LDL:    Lab Results   Component Value Date    CHOL 153 06/29/2017     Lab Results   Component Value Date    HDL 37 06/29/2017     Lab Results   Component Value Date     06/29/2017     Lab Results   Component Value Date    TRIG 70 06/29/2017     Lab Results   Component Value Date    CHOLHDLRATIO 5.6 09/29/2015     Lab Results   Component Value Date    NHDL 116 06/29/2017       Is the patient on a Statin? YES             Is the patient on Aspirin? YES    Medications     HMG CoA Reductase Inhibitors    pravastatin (PRAVACHOL) 20 MG tablet    Salicylates    aspirin 81 MG EC tablet          Last three blood pressure readings:  BP Readings from Last 3 Encounters:   02/09/18 (!) 152/102   02/02/18 (!) 198/110   11/27/17 (!) 180/128       Date of last diabetes office visit: 2/9/2018     Tobacco History:     History   Smoking Status     Never Smoker   Smokeless Tobacco     Never Used         Hypertension   Last three blood pressure readings:  BP Readings from Last 3 Encounters:   02/09/18 (!) 152/102   02/02/18 (!) 198/110   11/27/17 (!) 180/128     Blood pressure: FAILED    HTN Guidelines:  Age 18-59 BP range:  Less than 140/90  Age 60-85 with Diabetes:  Less than 140/90  Age 60-85 without Diabetes:  less than 150/90      Composite cancer screening  Chart review shows that this patient is due/due soon for the following None  Summary:    Patient is due/failing the following:   BP CHECK    Action needed:   Patient needs office visit for Diabetes and hypertension check.    Type of outreach:    Sent [employer] message.    Questions for provider review:    None                                                                                                                                     Ester Morrow CMA       Chart routed to Care Team .

## 2018-08-10 ENCOUNTER — OFFICE VISIT (OUTPATIENT)
Dept: FAMILY MEDICINE | Facility: CLINIC | Age: 38
End: 2018-08-10
Payer: COMMERCIAL

## 2018-08-10 VITALS
HEIGHT: 67 IN | HEART RATE: 71 BPM | BODY MASS INDEX: 38.72 KG/M2 | WEIGHT: 246.7 LBS | TEMPERATURE: 97.9 F | DIASTOLIC BLOOD PRESSURE: 110 MMHG | SYSTOLIC BLOOD PRESSURE: 146 MMHG | RESPIRATION RATE: 16 BRPM | OXYGEN SATURATION: 97 %

## 2018-08-10 DIAGNOSIS — R30.0 DYSURIA: ICD-10-CM

## 2018-08-10 DIAGNOSIS — E11.9 TYPE 2 DIABETES MELLITUS WITHOUT COMPLICATION, WITHOUT LONG-TERM CURRENT USE OF INSULIN (H): Primary | ICD-10-CM

## 2018-08-10 DIAGNOSIS — R31.0 GROSS HEMATURIA: ICD-10-CM

## 2018-08-10 DIAGNOSIS — I10 HTN, GOAL BELOW 140/90: ICD-10-CM

## 2018-08-10 DIAGNOSIS — R35.0 URINARY FREQUENCY: ICD-10-CM

## 2018-08-10 DIAGNOSIS — M77.12 LATERAL EPICONDYLITIS OF LEFT ELBOW: ICD-10-CM

## 2018-08-10 LAB
ALBUMIN UR-MCNC: 30 MG/DL
APPEARANCE UR: CLEAR
BILIRUB UR QL STRIP: ABNORMAL
COLOR UR AUTO: YELLOW
GLUCOSE UR STRIP-MCNC: NEGATIVE MG/DL
HBA1C MFR BLD: 6.3 % (ref 0–5.6)
HGB UR QL STRIP: NEGATIVE
HYALINE CASTS #/AREA URNS LPF: ABNORMAL /LPF
KETONES UR STRIP-MCNC: NEGATIVE MG/DL
LEUKOCYTE ESTERASE UR QL STRIP: NEGATIVE
MUCOUS THREADS #/AREA URNS LPF: PRESENT /LPF
NITRATE UR QL: NEGATIVE
NON-SQ EPI CELLS #/AREA URNS LPF: ABNORMAL /LPF
PH UR STRIP: 6.5 PH (ref 5–7)
PSA SERPL-ACNC: 0.62 UG/L (ref 0–4)
RBC #/AREA URNS AUTO: ABNORMAL /HPF
SOURCE: ABNORMAL
SP GR UR STRIP: 1.02 (ref 1–1.03)
UROBILINOGEN UR STRIP-ACNC: 0.2 EU/DL (ref 0.2–1)
WBC #/AREA URNS AUTO: ABNORMAL /HPF

## 2018-08-10 PROCEDURE — 99214 OFFICE O/P EST MOD 30 MIN: CPT | Performed by: NURSE PRACTITIONER

## 2018-08-10 PROCEDURE — 36415 COLL VENOUS BLD VENIPUNCTURE: CPT | Performed by: NURSE PRACTITIONER

## 2018-08-10 PROCEDURE — 81001 URINALYSIS AUTO W/SCOPE: CPT | Performed by: NURSE PRACTITIONER

## 2018-08-10 PROCEDURE — 80053 COMPREHEN METABOLIC PANEL: CPT | Performed by: NURSE PRACTITIONER

## 2018-08-10 PROCEDURE — 83036 HEMOGLOBIN GLYCOSYLATED A1C: CPT | Performed by: NURSE PRACTITIONER

## 2018-08-10 PROCEDURE — 80061 LIPID PANEL: CPT | Performed by: NURSE PRACTITIONER

## 2018-08-10 PROCEDURE — 82043 UR ALBUMIN QUANTITATIVE: CPT | Performed by: NURSE PRACTITIONER

## 2018-08-10 PROCEDURE — G0103 PSA SCREENING: HCPCS | Performed by: NURSE PRACTITIONER

## 2018-08-10 PROCEDURE — 84443 ASSAY THYROID STIM HORMONE: CPT | Performed by: NURSE PRACTITIONER

## 2018-08-10 NOTE — MR AVS SNAPSHOT
After Visit Summary   8/10/2018    Connor Emerson    MRN: [id number]           Patient Information     Date Of Birth          1980        Visit Information        Provider Department      8/10/2018 11:00 AM Radha Shetty Ra, APRN CNP [facility]        Today's Diagnoses     Type 2 diabetes mellitus without complication, without long-term current use of insulin (H)    -  1    Dysuria        Gross hematuria        Urinary frequency          Care Instructions    Start taking the 3 medications we discussed with your dinner.    See me in 2 weeks.    Ice the elbow, use the counter force brace we discussed.    We will talk more about the urinary symptoms you are experiencing when we have the lab tests back.          Follow-ups after your visit        Follow-up notes from your care team     Return in about 2 weeks (around 8/24/2018) for follow up.      Your next 10 appointments already scheduled     Aug 24, 2018  2:40 PM CDT   Office Visit with JERRY Alicia Ra, CNP   [facility]    [address] [place] [id number] [phone]           Bring a current list of meds and any records pertaining to this visit. For Physicals, please bring immunization records and any forms needing to be filled out. Please arrive 10 minutes early to complete paperwork.              Who to contact     If you have questions or need follow up information about today's clinic visit or your schedule please contact [facility] directly at [phone].  Normal or non-critical lab and imaging results will be communicated to you by MyChart, letter or phone within 4 business days after the clinic has received the results. If you do not hear from us within 7 days, please contact the clinic through MyChart or phone. If you have a critical or abnormal lab result, we will notify you by phone as soon as possible.  Submit refill requests  "through "[employer] or call your pharmacy and they will forward the refill request to us. Please allow 3 business days for your refill to be completed.          Additional Information About Your Visit        [employer] Information     "[employer] gives you secure access to your electronic health record. If you see a primary care provider, you can also send messages to your care team and make appointments. If you have questions, please call your primary care clinic.  If you do not have a primary care provider, please call [phone] and they will assist you.        Care EveryWhere ID     This is your Care EveryWhere ID. This could be used by other organizations to access your [place] medical records  [id number]        Your Vitals Were     Pulse Temperature Respirations Height Pulse Oximetry BMI (Body Mass Index)    71 97.9  F (36.6  C) (Tympanic) 16 5' 7.25\" (1.708 m) 97% 38.35 kg/m2       Blood Pressure from Last 3 Encounters:   08/10/18 (!) 146/110   02/09/18 (!) 152/102   02/02/18 (!) 198/110    Weight from Last 3 Encounters:   08/10/18 246 lb 11.2 oz (111.9 kg)   02/09/18 258 lb 9.6 oz (117.3 kg)   02/02/18 259 lb 6.4 oz (117.7 kg)              We Performed the Following     *UA reflex to Microscopic and Culture ([place] [facility] [place] [facility] (except [place] and [place])     Albumin Random Urine Quantitative with Creat Ratio     Comprehensive metabolic panel     Hemoglobin A1c     Lipid panel reflex to direct LDL Fasting     PSA, screen     TSH with free T4 reflex          Today's Medication Changes          These changes are accurate as of 8/10/18 12:08 PM.  If you have any questions, ask your nurse or doctor.               Stop taking these medicines if you haven't already. Please contact your care team if you have questions.     amLODIPine 5 MG tablet   Commonly known as:  NORVASC   Stopped by:  Radha Shetty Ra, JERRY CNP           FLUoxetine 20 MG capsule   Commonly known as:  PROzac   Stopped by:  Sena, " JERRY Garcia Ra, CNP           hydrochlorothiazide 25 MG tablet   Commonly known as:  HYDRODIURIL   Stopped by:  Radha Shetty Ra, APRN CNP                    Primary Care Provider Office Phone # Fax #    JERRY Alicia Ra, [phone] [address] [address] [facility] [address]        Equal Access to Services     [facility]: Hadii aad ku hadasho Soomaali, waaxda luqadaha, qaybta kaalmada adeegyada, waxay idiin hayaan adeeg kharash la'aahaydee . So [facility] [phone].    ATENCIÓN: Si habla español, tiene a chu disposición servicios gratuitos de asistencia lingüística. Llame al [phone].    We comply with applicable federal civil rights laws and [place] laws. We do not discriminate on the basis of race, color, national origin, age, disability, sex, sexual orientation, or gender identity.            Thank you!     Thank you for choosing [facility]  for your care. Our goal is always to provide you with excellent care. Hearing back from our patients is one way we can continue to improve our services. Please take a few minutes to complete the written survey that you may receive in the mail after your visit with us. Thank you!             Your Updated Medication List - Protect others around you: Learn how to safely use, store and throw away your medicines at www.disposemymeds.org.          This list is accurate as of 8/10/18 12:08 PM.  Always use your most recent med list.                   Brand Name Dispense Instructions for use Diagnosis    aspirin 81 MG EC tablet     90 tablet    Take 1 tablet (81 mg) by mouth daily    Type 2 diabetes mellitus without complication, without long-term current use of insulin (H)       cyclobenzaprine 5 MG tablet    FLEXERIL    30 tablet    Take 1 tablet (5 mg) by mouth 3 times daily as needed for muscle spasms    Acute left-sided low back pain without sciatica       EPINEPHrine 0.3 MG/0.3ML injection 2-pack    EPIPEN/ADRENACLICK/or ANY BX  GENERIC EQUIV    1 each    Inject 0.3 mLs (0.3 mg) into the muscle once as needed for anaphylaxis    Allergic reaction, initial encounter       lisinopril 20 MG tablet    PRINIVIL/ZESTRIL    90 tablet    Take 1 tablet (20 mg) by mouth daily    Type 2 diabetes mellitus without complication, without long-term current use of insulin (H)       pravastatin 20 MG tablet    PRAVACHOL    90 tablet    Take 1 tablet (20 mg) by mouth daily    Type 2 diabetes mellitus without complication, without long-term current use of insulin (H)

## 2018-08-10 NOTE — PATIENT INSTRUCTIONS
Start taking the 3 medications we discussed with your dinner.    See me in 2 weeks.    Ice the elbow, use the counter force brace we discussed.    We will talk more about the urinary symptoms you are experiencing when we have the lab tests back.

## 2018-08-10 NOTE — PROGRESS NOTES
SUBJECTIVE:   Connor Emerson is a 38 year old male who presents to clinic today for the following health issues:      Diabetes Follow-up      Patient is checking blood sugars: not at all    Diabetic concerns: None     Symptoms of hypoglycemia (low blood sugar): none     Paresthesias (numbness or burning in feet) or sores: No     Date of last diabetic eye exam: 6/2018    Diabetes Management Resources    Hyperlipidemia Follow-Up      Rate your low fat/cholesterol diet?: good    Taking statin?  No    Other lipid medications/supplements?:  none    Hypertension Follow-up      Outpatient blood pressures are not being checked.    Low Salt Diet: no added salt    BP Readings from Last 2 Encounters:   08/10/18 (!) 146/110   02/09/18 (!) 152/102     Hemoglobin A1C (%)   Date Value   08/10/2018 6.3 (H)   11/27/2017 6.5 (H)     LDL Cholesterol Calculated (mg/dL)   Date Value   06/29/2017 102 (H)   11/11/2016 110 (H)       Amount of exercise or physical activity: 2-3 days/week for an average of 15-30 minutes    Problems taking medications regularly: Yes,  Patient does not take medication     Medication side effects: none    Diet: regular (no restrictions)    Patient here for DM/ HTN/ hyperlipidemia follow up.  States he has not taken ANY of his medications since his last visit.  Says he has issues with remembering to take medications before he leaves in the morning.    He has been asymptomatic. Denies HA, vision changes, numbness/tingling, fatigue, CP, palpitations, SOB, abdominal/flank pain, peripheral neuropathy or other symptoms.  He has been exercising more regularly and has been cutting sugars out of his diet.  Recently saw eye doctor    Left elbow pain  Patient states he has had pain in his lateral left elbow for past few months  Pain is worst in morning when he wakes up. Feels very stiff and is 10/10 pain.  Pain worse with movement.  Described as dull and achy, rated at 2/10 now.  No recent trauma or overuse.  Has not  used any medications for pain relief.  Has been icing area occasionally.  No other joint pains. No swelling, warmth, or erythema in joint.    Urinary symptoms  Patient reports he has had issues with urination recently.  Says he drinks a lot of water during the day, but never needs to urinate during the day.  At night, he is waking up every hour to urinate.  Two weeks ago, he had two days of dysuria and saw drops of bright red blood in urine.  Hematuria and dysuria has resolved. Has mild right lower back ache still.  No fever/chills, abdominal pain, penile discharge, or other urinary symptoms.  No history of kidney stones.    Problem list and histories reviewed & adjusted, as indicated.  Additional history: as documented    Patient Active Problem List   Diagnosis     CARDIOVASCULAR SCREENING; LDL GOAL LESS THAN 160     Low back pain     Insomnia     Obesity     HTN, goal below 140/90     Anxiety     GERD (gastroesophageal reflux disease)     Atypical chest pain     Intractable chronic migraine without aura     Type 2 diabetes mellitus without complication (H)     Adjustment disorder with mixed anxiety and depressed mood     Lateral epicondylitis of right elbow     Cellulitis of hand, right     Morbid obesity (H)     Past Surgical History:   Procedure Laterality Date     INJECT BLOCK MEDIAL BRANCH CERVICAL/THORACIC/LUMBAR       ORTHOPEDIC SURGERY      Ganesh. Rotator cuff repair.       Social History   Substance Use Topics     Smoking status: Never Smoker     Smokeless tobacco: Never Used     Alcohol use 0.0 oz/week     0 Standard drinks or equivalent per week      Comment: social     Family History   Problem Relation Age of Onset     Cancer Mother      uterine     Family History Negative Father      Cancer Maternal Uncle      melanoma         Current Outpatient Prescriptions   Medication Sig Dispense Refill     EPINEPHrine (EPIPEN) 0.3 MG/0.3ML injection Inject 0.3 mLs (0.3 mg) into the muscle once as needed for  "anaphylaxis 1 each 0     aspirin 81 MG EC tablet Take 1 tablet (81 mg) by mouth daily (Patient not taking: Reported on 8/10/2018) 90 tablet 3     cyclobenzaprine (FLEXERIL) 5 MG tablet Take 1 tablet (5 mg) by mouth 3 times daily as needed for muscle spasms (Patient not taking: Reported on 8/10/2018) 30 tablet 0     lisinopril (PRINIVIL/ZESTRIL) 20 MG tablet Take 1 tablet (20 mg) by mouth daily (Patient not taking: Reported on 8/10/2018) 90 tablet 0     pravastatin (PRAVACHOL) 20 MG tablet Take 1 tablet (20 mg) by mouth daily (Patient not taking: Reported on 8/10/2018) 90 tablet 0     Allergies   Allergen Reactions     Vicodin [Hydrocodone-Acetaminophen] Nausea and Vomiting and GI Disturbance       Reviewed and updated as needed this visit by clinical staff  Tobacco  Allergies  Meds  Med Hx  Surg Hx  Fam Hx  Soc Hx      Reviewed and updated as needed this visit by Provider         ROS:  Constitutional, HEENT, cardiovascular, pulmonary, gi and gu systems are negative, except as otherwise noted.    OBJECTIVE:     BP (!) 146/110 (BP Location: Right arm, Patient Position: Chair, Cuff Size: Adult Large)  Pulse 71  Temp 97.9  F (36.6  C) (Tympanic)  Resp 16  Ht 5' 7.25\" (1.708 m)  Wt 246 lb 11.2 oz (111.9 kg)  SpO2 97%  BMI 38.35 kg/m2  Body mass index is 38.35 kg/(m^2).  GENERAL: healthy, alert and no distress  HENT: ear canals and TM's normal, nose and mouth without ulcers or lesions  NECK: no adenopathy, no asymmetry, masses, or scars and thyroid normal to palpation  RESP: lungs clear to auscultation - no rales, rhonchi or wheezes  CV: regular rate and rhythm, normal S1 S2, no S3 or S4, no murmur, click or rub, no peripheral edema and peripheral pulses strong  ABDOMEN: soft, nontender, no hepatosplenomegaly, no masses and bowel sounds normal  MS: no gross musculoskeletal defects noted, no edema  Left elbow: pain with palpation of lateral epicondyle.  No joint swelling/ erythema/ warmth.  PSYCH: mentation " appears normal, affect normal/bright    Diagnostic Test Results:  Results for orders placed or performed in visit on 08/10/18 (from the past 24 hour(s))   Hemoglobin A1c   Result Value Ref Range    Hemoglobin A1C 6.3 (H) 0 - 5.6 %       ASSESSMENT/PLAN:   1. Type 2 diabetes mellitus without complication, without long-term current use of insulin (H)  Patient with type 2 DM that is currently controlled with diet/ exercise.  HgbA1c today 6.3%  Saw eye doctor earlier this year.  Obtained lipid panel, CMP, CBC, TSH, microalbumin today  Continue with diet and exercise.   Instructed patient to re-start his ASA 81mg, statin, and lisinopril as this will help prevent DM complications. Discussed options for taking medications at different time of the day to increase medication compliance.  - Lipid panel reflex to direct LDL Fasting; Future  - Comprehensive metabolic panel; Future  - Albumin Random Urine Quantitative with Creat Ratio; Future  - Hemoglobin A1c  - TSH with free T4 reflex    2. HTN, goal below 140/90  Patient with elevated blood pressure today, has not been taking medications at all.  BP is improved from previous visits, likely due to patient working on diet and exercise.  Encouraged patient to continue with diet and exercise.  Will hold amlodipine and hydrochlorothiazide for now since BP improved.   Instructed patient to re-start lisinopril daily.  Follow up in 2 weeks for BP follow up. May need to add medications back on if BP still elevated.    3. Dysuria  4. Gross hematuria  5. Urinary frequency  Patient has had urinary issues for past few months with 2 days of hematuria & dysuria recently.  Unclear etiology at this time.   Hematuria/ dysuria symptoms seem consistent with possible kidney stone.  Urinary frequency could be due to BPH or other issue.  Obtained PSA and UA today. Will consider urology consult or other work-up depending on results.  Follow up in 2 weeks  - PSA, screen  - *UA reflex to Microscopic  and Culture ([place] and [place] [facility] (except [place] and [place])    6. Lateral epicondylitis of left elbow  Pain in left lateral elbow for past few months; consistent with lateral epicondylitis  Recommended patient ice the area TID and massage the forearm.  Also recommended patient use a counter-force brace.  Follow up if no improvement or if worsening.    JERRY Alicia Ra [facility] [place]

## 2018-08-11 LAB
ALBUMIN SERPL-MCNC: 4.1 G/DL (ref 3.4–5)
ALP SERPL-CCNC: 79 U/L (ref 40–150)
ALT SERPL W P-5'-P-CCNC: 28 U/L (ref 0–70)
ANION GAP SERPL CALCULATED.3IONS-SCNC: 8 MMOL/L (ref 3–14)
AST SERPL W P-5'-P-CCNC: 19 U/L (ref 0–45)
BILIRUB SERPL-MCNC: 0.7 MG/DL (ref 0.2–1.3)
BUN SERPL-MCNC: 12 MG/DL (ref 7–30)
CALCIUM SERPL-MCNC: 8.5 MG/DL (ref 8.5–10.1)
CHLORIDE SERPL-SCNC: 108 MMOL/L (ref 94–109)
CHOLEST SERPL-MCNC: 182 MG/DL
CO2 SERPL-SCNC: 29 MMOL/L (ref 20–32)
CREAT SERPL-MCNC: 0.68 MG/DL (ref 0.66–1.25)
CREAT UR-MCNC: 335 MG/DL
GFR SERPL CREATININE-BSD FRML MDRD: >90 ML/MIN/1.7M2
GLUCOSE SERPL-MCNC: 137 MG/DL (ref 70–99)
HDLC SERPL-MCNC: 36 MG/DL
LDLC SERPL CALC-MCNC: 121 MG/DL
MICROALBUMIN UR-MCNC: 109 MG/L
MICROALBUMIN/CREAT UR: 32.54 MG/G CR (ref 0–17)
NONHDLC SERPL-MCNC: 146 MG/DL
POTASSIUM SERPL-SCNC: 3.9 MMOL/L (ref 3.4–5.3)
PROT SERPL-MCNC: 6.8 G/DL (ref 6.8–8.8)
SODIUM SERPL-SCNC: 145 MMOL/L (ref 133–144)
TRIGL SERPL-MCNC: 126 MG/DL
TSH SERPL DL<=0.005 MIU/L-ACNC: 1.33 MU/L (ref 0.4–4)

## 2018-12-11 ENCOUNTER — ANCILLARY PROCEDURE (OUTPATIENT)
Dept: GENERAL RADIOLOGY | Facility: CLINIC | Age: 38
End: 2018-12-11
Payer: COMMERCIAL

## 2018-12-11 ENCOUNTER — OFFICE VISIT (OUTPATIENT)
Dept: URGENT CARE | Facility: URGENT CARE | Age: 38
End: 2018-12-11
Payer: COMMERCIAL

## 2018-12-11 VITALS
OXYGEN SATURATION: 97 % | TEMPERATURE: 98.7 F | SYSTOLIC BLOOD PRESSURE: 150 MMHG | HEART RATE: 104 BPM | WEIGHT: 246 LBS | BODY MASS INDEX: 38.24 KG/M2 | DIASTOLIC BLOOD PRESSURE: 100 MMHG

## 2018-12-11 DIAGNOSIS — S39.011A ABDOMINAL WALL STRAIN, INITIAL ENCOUNTER: Primary | ICD-10-CM

## 2018-12-11 DIAGNOSIS — R10.31 ABDOMINAL PAIN, RIGHT LOWER QUADRANT: ICD-10-CM

## 2018-12-11 DIAGNOSIS — I10 HTN, GOAL BELOW 140/90: ICD-10-CM

## 2018-12-11 PROCEDURE — 74019 RADEX ABDOMEN 2 VIEWS: CPT

## 2018-12-11 PROCEDURE — 99214 OFFICE O/P EST MOD 30 MIN: CPT | Performed by: FAMILY MEDICINE

## 2018-12-11 ASSESSMENT — PAIN SCALES - GENERAL: PAINLEVEL: EXTREME PAIN (8)

## 2018-12-12 NOTE — PROGRESS NOTES
Subjective:   Connor Emerson is a 38 year old male who presents for   Chief Complaint   Patient presents with     Urgent Care     Flank Pain     right side, pain started this afternnon, noticed a large bump that is sticking out and is painful      Lunch today noticed right sided discomfort  Has had no vomiting or diarrhea. Had a good appetite today. No hx of abdominal surgeries. Denies fevers. Pain at this time he rates at 8/10 when getting up and moving. At rest 4/10. Earlier today he did say that he was pushing a refrigerator and that his symptoms came on afterwards.    No nausea at this time. Has not had alcohol in 2 months.     HTN: apparently stopped taking meds a year ago. No headaches or visual changes at this time.     T2DM: Diet controlled at this time. Previously on the metformin apparently didn't tolerate it that well ( GI side effects)   Lab Results   Component Value Date    A1C 6.3 08/10/2018    A1C 6.5 11/27/2017    A1C 6.4 06/29/2017    A1C 6.5 02/13/2017    A1C 6.4 11/11/2016       Patient Active Problem List    Diagnosis Date Noted     Morbid obesity (H) 11/27/2017     Priority: Medium     Cellulitis of hand, right 04/27/2017     Priority: Medium     Lateral epicondylitis of right elbow 11/11/2016     Priority: Medium     Adjustment disorder with mixed anxiety and depressed mood 04/23/2016     Priority: Medium     Type 2 diabetes mellitus without complication (H) 10/27/2015     Priority: Medium     Intractable chronic migraine without aura 01/08/2015     Priority: Medium     Problem list name updated by automated process. Provider to review       GERD (gastroesophageal reflux disease) 12/02/2013     Priority: Medium     Atypical chest pain 12/02/2013     Priority: Medium     Anxiety 07/09/2013     Priority: Medium     HTN, goal below 140/90 07/02/2013     Priority: Medium     Obesity 11/26/2012     Priority: Medium     Insomnia 02/21/2012     Priority: Medium     Low back pain 02/04/2012      Priority: Medium     Diagnosis updated by automated process. Provider to review and confirm.       CARDIOVASCULAR SCREENING; LDL GOAL LESS THAN 160 10/28/2011     Priority: Medium       Current Outpatient Medications   Medication     aspirin 81 MG EC tablet     cyclobenzaprine (FLEXERIL) 5 MG tablet     EPINEPHrine (EPIPEN) 0.3 MG/0.3ML injection     lisinopril (PRINIVIL/ZESTRIL) 20 MG tablet     pravastatin (PRAVACHOL) 20 MG tablet     No current facility-administered medications for this visit.        ROS:  As above per HPI    Objective:   BP (!) 150/100   Pulse 104   Temp 98.7  F (37.1  C) (Oral)   Wt 111.6 kg (246 lb)   SpO2 97%   BMI 38.24 kg/m  , Body mass index is 38.24 kg/m .  Gen:  NAD, well-nourished, sitting in chair comfortably  HEENT: EOMI, sclera anicteric, Head normocephalic, ; nares patent; moist mucous membranes  Neck: trachea midline, no thyromegaly  CV:  Hemodynamically stable, RRR  Pulm:  no increased work of breathing , CTAB, no wheezes/rales/rhonchi   ABD: soft, non-distended, carnett's sign positive in R mid abdominal region overlying rectus muscle, no signs of peritonitis, allen's sign negative. No pain in RLQ.   Extrem: no cyanosis, edema or clubbing  Skin: no obvious rashes or abnormalities  Psych: Euthymic, linear thoughts, normal rate of speech    Prelim Radiology:   ABDOMEN TWO VIEWS 12/11/2018 7:49 PM      HISTORY: Palpable mass on right side of abdomen.  COMPARISON: None.                                                IMPRESSION: Moderate amount of stool. No evidence for obstruction or  free air.    Assessment & Plan:   Connor Emerson, 38 year old male who presents with:  Abdominal pain, right lower quadrant  I suspect abdominal wall strain vs rectus sheath hematoma at this time - superficial discomfort that is palpable. Given his body habitus and his attempt to push a heavy object today he likely strained the right portion of the rectus abdominal muscle. As it is in the more mid  region rather than lower region in addition to not being anticoagulated we will defer CT imaging at this time and recommend rest and analgesia. If pain becomes worse then he should go to ED for evaluation and likely imaging. Suspicion for gallbladder or appendix pathology low at this time.   - XR Abdomen 2 Views    HTN, goal below 140/90  Uncontrolled and is off BP meds- he states he will make appt to see his provider. Agitation from abdominal pain may also be contributing to the elevation today.       Koffi Stephens MD   [facility]     Options for treatment and/or follow-up care were reviewed with the patient. Connor Emerson and/or legal guardian was engaged and actively involved in the decision making process. Patient/guardian verbalized understanding of the options discussed and was satisfied with the final plan.

## 2018-12-12 NOTE — PATIENT INSTRUCTIONS
Take ibuprofen and or tylenol for pain    If symptoms get worse please call the clinic or go to emergency department    Apply heat packs to the affected area     Avoid pushing/lifting heavy weights

## 2018-12-31 ENCOUNTER — TELEPHONE (OUTPATIENT)
Dept: FAMILY MEDICINE | Facility: CLINIC | Age: 38
End: 2018-12-31

## 2018-12-31 NOTE — LETTER
[facility]  [address] [facility], [place] [address] [phone]       January 14, 2019    Connor Emerson  [address] [place] [id number]    Dear Connor,    We care about your health and have reviewed your health plan and are making recommendations based on this review, to optimize your health.    You are in particular need of attention regarding:  -Diabetes    We are recommending that you:  -schedule a FOLLOWUP OFFICE APPOINTMENT with a provider within the next 1-4 weeks as It has been over 3 months since your last appointment.     (If you see endocrinology for your diabetes then it would be helpful to have your A1c results forwarded to our office to update our records and otherwise please disregard this message.)    There are specific measures we need to closely follow and work on until the targets are met which will then minimize your chances of diabetic complications.  These measures and your recent results are listed below:    Test Frequency Target Your result   A1C lab (average glucose the last 2-3 months) Every 3-6 months <7   (<8 if other chronic diseases) Lab Results   Component Value Date    A1C 6.3 08/10/2018        Blood Pressure Every 3-6 months <140/90 BP Readings from Last 2 Encounters:   12/11/18 (!) 150/100   08/10/18 (!) 146/110        In addition, here is a list of due or overdue Health Maintenance reminders:  Health Maintenance Due   Topic Date Due     HIV SCREEN (SYSTEM ASSIGNED)  05/11/1996     Eye Exam - yearly  09/28/2017     Flu Vaccine (1) 09/01/2018     A1C (Diabetes) Lab - every 6 months  02/10/2019       To address the above recommendations, we encourage you to contact us at [phone], via D[employer]en[employer] or by contacting Central Scheduling toll free at [phone] 24 hours a day. They will assist you with finding the most convenient time and location.    Thank you for trusting [facility] and we appreciate the opportunity to serve you.   We look forward to supporting your healthcare needs in the future.    Healthy Regards,    [facility] Team

## 2018-12-31 NOTE — TELEPHONE ENCOUNTER
Panel Management Review      Patient has the following on his problem list:     Diabetes    ASA: Failed    Last A1C  Lab Results   Component Value Date    A1C 6.3 08/10/2018    A1C 6.5 11/27/2017    A1C 6.4 06/29/2017    A1C 6.5 02/13/2017    A1C 6.4 11/11/2016     A1C tested: FAILED    Last LDL:    Lab Results   Component Value Date    CHOL 182 08/10/2018     Lab Results   Component Value Date    HDL 36 08/10/2018     Lab Results   Component Value Date     08/10/2018     Lab Results   Component Value Date    TRIG 126 08/10/2018     Lab Results   Component Value Date    CHOLHDLRATIO 5.6 09/29/2015     Lab Results   Component Value Date    NHDL 146 08/10/2018       Is the patient on a Statin? YES             Is the patient on Aspirin? YES    Medications     HMG CoA Reductase Inhibitors    pravastatin (PRAVACHOL) 20 MG tablet    Salicylates    aspirin 81 MG EC tablet          Last three blood pressure readings:  BP Readings from Last 3 Encounters:   12/11/18 (!) 150/100   08/10/18 (!) 146/110   02/09/18 (!) 152/102       Date of last diabetes office visit: 8/2018     Tobacco History:     History   Smoking Status     Never Smoker   Smokeless Tobacco     Never Used           Composite cancer screening  Chart review shows that this patient is due/due soon for the following None  Summary:    Patient is due/failing the following:   A1C    Action needed:   Patient needs office visit for diabetes follow up.    Type of outreach:    Sent Thing[employer] message.    Questions for provider review:    None                                                                                                                                    Ester Morrow CMA       Chart routed to Care Team .

## 2019-05-03 ENCOUNTER — TELEPHONE (OUTPATIENT)
Dept: FAMILY MEDICINE | Facility: CLINIC | Age: 39
End: 2019-05-03

## 2019-05-03 NOTE — TELEPHONE ENCOUNTER
Panel Management Review      Patient has the following on his problem list:     Hypertension   Last three blood pressure readings:  BP Readings from Last 3 Encounters:   12/11/18 (!) 150/100   08/10/18 (!) 146/110   02/09/18 (!) 152/102     Blood pressure: FAILED    HTN Guidelines:  Less than 140/90      Composite cancer screening  Chart review shows that this patient is due/due soon for the following None  Summary:    Patient is due/failing the following:   BP CHECK    Action needed:   Patient needs nurse only appointment.    Type of outreach:    Sent [employer] message.    Questions for provider review:    None                                                                                                                                    Ester Morrow CMA       Chart routed to Care Team .

## 2019-10-11 ENCOUNTER — TELEPHONE (OUTPATIENT)
Dept: FAMILY MEDICINE | Facility: CLINIC | Age: 39
End: 2019-10-11

## 2019-10-11 NOTE — TELEPHONE ENCOUNTER
Panel Management Review      Patient has the following on his problem list:     Hypertension   Last three blood pressure readings:  BP Readings from Last 3 Encounters:   12/11/18 (!) 150/100   08/10/18 (!) 146/110   02/09/18 (!) 152/102     Blood pressure: FAILED    HTN Guidelines:  Less than 140/90      Composite cancer screening  Chart review shows that this patient is due/due soon for the following None  Summary:    Patient is due/failing the following:   BP CHECK    Action needed:   Patient needs nurse only appointment.    Type of outreach:    Sent [employer] message.    Questions for provider review:    None                                                                                                                                    Ester Morrow CMA       Chart routed to Care Team .

## 2020-01-20 ENCOUNTER — TELEPHONE (OUTPATIENT)
Dept: FAMILY MEDICINE | Facility: CLINIC | Age: 40
End: 2020-01-20

## 2020-01-20 NOTE — TELEPHONE ENCOUNTER
Panel Management Review      Patient has the following on his problem list:     Hypertension   Last three blood pressure readings:  BP Readings from Last 3 Encounters:   12/11/18 (!) 150/100   08/10/18 (!) 146/110   02/09/18 (!) 152/102     Blood pressure: FAILED    HTN Guidelines:  Less than 140/90      Composite cancer screening  Chart review shows that this patient is due/due soon for the following None  Summary:    Patient is due/failing the following:   BP CHECK    Action needed:   Patient needs nurse only appointment.    Type of outreach:    Sent [employer] message.    Questions for provider review:    None                                                                                                                                    Ester Morrow CMA       Chart routed to Care Team .

## 2020-01-20 NOTE — LETTER
[facility]  [address] [facility] [address]  [phone]       January 27, 2020    Connor Emerson  [address] [place] [id number]    Dear Connor,    We care about your health and have reviewed your health plan and are making recommendations based on this review, to optimize your health.    You are in particular need of attention regarding:  -Blood Pressure (your goal is <140/90) BP Readings from Last 2 Encounters:   12/11/18 (!) 150/100   08/10/18 (!) 146/110        We are recommending that you:  -schedule a FOLLOWUP OFFICE APPOINTMENT within the next 1-4 weeks as It has been over 6 months since your last appointment.    In addition, here is a list of due or overdue Health Maintenance reminders.    Health Maintenance Due   Topic Date Due     Preventive Care Visit  1980     HIV Screening  05/11/1993     Eye Exam  09/28/2017     A1C Lab  02/10/2019     Basic Metabolic Panel  08/10/2019     Cholesterol Lab  08/10/2019     Kidney Microalbumin Urine Test  08/10/2019     Diabetic Foot Exam  08/10/2019     Flu Vaccine (1) 09/01/2019     PHQ-2  01/01/2020       To address the above recommendations, we encourage you to contact us at [phone], via [employer] or by contacting Central Scheduling toll free at [phone] 24 hours a day. They will assist you with finding the most convenient time and location.    Thank you for trusting [facility] and we appreciate the opportunity to serve you.  We look forward to supporting your healthcare needs in the future.    Healthy Regards,    Your [facility] Team

## 2020-02-16 ENCOUNTER — HEALTH MAINTENANCE LETTER (OUTPATIENT)
Age: 40
End: 2020-02-16

## 2020-05-08 ENCOUNTER — E-VISIT (OUTPATIENT)
Dept: FAMILY MEDICINE | Facility: CLINIC | Age: 40
End: 2020-05-08

## 2020-05-08 DIAGNOSIS — Z53.9 ERRONEOUS ENCOUNTER--DISREGARD: Primary | ICD-10-CM

## 2020-05-12 ENCOUNTER — TELEPHONE (OUTPATIENT)
Dept: FAMILY MEDICINE | Facility: CLINIC | Age: 40
End: 2020-05-12

## 2020-05-12 ENCOUNTER — OFFICE VISIT (OUTPATIENT)
Dept: FAMILY MEDICINE | Facility: CLINIC | Age: 40
End: 2020-05-12

## 2020-05-12 VITALS
TEMPERATURE: 98.6 F | HEART RATE: 106 BPM | SYSTOLIC BLOOD PRESSURE: 170 MMHG | OXYGEN SATURATION: 96 % | WEIGHT: 240 LBS | RESPIRATION RATE: 16 BRPM | DIASTOLIC BLOOD PRESSURE: 116 MMHG | HEIGHT: 67 IN | BODY MASS INDEX: 37.67 KG/M2

## 2020-05-12 DIAGNOSIS — E11.65 TYPE 2 DIABETES MELLITUS WITH HYPERGLYCEMIA, WITHOUT LONG-TERM CURRENT USE OF INSULIN (H): Primary | ICD-10-CM

## 2020-05-12 DIAGNOSIS — E11.9 TYPE 2 DIABETES MELLITUS WITHOUT COMPLICATION, WITHOUT LONG-TERM CURRENT USE OF INSULIN (H): ICD-10-CM

## 2020-05-12 DIAGNOSIS — E11.9 TYPE 2 DIABETES MELLITUS WITHOUT COMPLICATION (H): Primary | ICD-10-CM

## 2020-05-12 DIAGNOSIS — I10 HTN, GOAL BELOW 140/90: ICD-10-CM

## 2020-05-12 DIAGNOSIS — R35.0 URINARY FREQUENCY: ICD-10-CM

## 2020-05-12 DIAGNOSIS — R35.0 URINARY FREQUENCY: Primary | ICD-10-CM

## 2020-05-12 LAB
ALBUMIN UR-MCNC: NEGATIVE MG/DL
ANION GAP SERPL CALCULATED.3IONS-SCNC: 9 MMOL/L (ref 3–14)
APPEARANCE UR: CLEAR
BILIRUB UR QL STRIP: NEGATIVE
BUN SERPL-MCNC: 20 MG/DL (ref 7–30)
CALCIUM SERPL-MCNC: 9.8 MG/DL (ref 8.5–10.1)
CHLORIDE SERPL-SCNC: 98 MMOL/L (ref 94–109)
CO2 SERPL-SCNC: 28 MMOL/L (ref 20–32)
COLOR UR AUTO: YELLOW
CREAT SERPL-MCNC: 0.66 MG/DL (ref 0.66–1.25)
CREAT UR-MCNC: 36 MG/DL
GFR SERPL CREATININE-BSD FRML MDRD: >90 ML/MIN/{1.73_M2}
GLUCOSE SERPL-MCNC: 490 MG/DL (ref 70–99)
GLUCOSE UR STRIP-MCNC: >=1000 MG/DL
HBA1C MFR BLD: 11.9 % (ref 0–5.6)
HGB UR QL STRIP: NEGATIVE
KETONES UR STRIP-MCNC: 15 MG/DL
LEUKOCYTE ESTERASE UR QL STRIP: NEGATIVE
MICROALBUMIN UR-MCNC: 54 MG/L
MICROALBUMIN/CREAT UR: 149.86 MG/G CR (ref 0–17)
NITRATE UR QL: NEGATIVE
PH UR STRIP: 5 PH (ref 5–7)
POTASSIUM SERPL-SCNC: 3.9 MMOL/L (ref 3.4–5.3)
SODIUM SERPL-SCNC: 135 MMOL/L (ref 133–144)
SOURCE: ABNORMAL
SP GR UR STRIP: 1.01 (ref 1–1.03)
UROBILINOGEN UR STRIP-ACNC: 0.2 EU/DL (ref 0.2–1)

## 2020-05-12 PROCEDURE — 83036 HEMOGLOBIN GLYCOSYLATED A1C: CPT | Performed by: PHYSICIAN ASSISTANT

## 2020-05-12 PROCEDURE — 80048 BASIC METABOLIC PNL TOTAL CA: CPT | Performed by: PHYSICIAN ASSISTANT

## 2020-05-12 PROCEDURE — 81003 URINALYSIS AUTO W/O SCOPE: CPT | Performed by: PHYSICIAN ASSISTANT

## 2020-05-12 PROCEDURE — 36415 COLL VENOUS BLD VENIPUNCTURE: CPT | Performed by: PHYSICIAN ASSISTANT

## 2020-05-12 PROCEDURE — 99215 OFFICE O/P EST HI 40 MIN: CPT | Performed by: PHYSICIAN ASSISTANT

## 2020-05-12 PROCEDURE — 82043 UR ALBUMIN QUANTITATIVE: CPT | Performed by: PHYSICIAN ASSISTANT

## 2020-05-12 ASSESSMENT — MIFFLIN-ST. JEOR: SCORE: 1951.22

## 2020-05-12 ASSESSMENT — ANXIETY QUESTIONNAIRES
5. BEING SO RESTLESS THAT IT IS HARD TO SIT STILL: NOT AT ALL
7. FEELING AFRAID AS IF SOMETHING AWFUL MIGHT HAPPEN: NOT AT ALL
6. BECOMING EASILY ANNOYED OR IRRITABLE: SEVERAL DAYS
3. WORRYING TOO MUCH ABOUT DIFFERENT THINGS: NOT AT ALL
2. NOT BEING ABLE TO STOP OR CONTROL WORRYING: NOT AT ALL
GAD7 TOTAL SCORE: 2
IF YOU CHECKED OFF ANY PROBLEMS ON THIS QUESTIONNAIRE, HOW DIFFICULT HAVE THESE PROBLEMS MADE IT FOR YOU TO DO YOUR WORK, TAKE CARE OF THINGS AT HOME, OR GET ALONG WITH OTHER PEOPLE: SOMEWHAT DIFFICULT
1. FEELING NERVOUS, ANXIOUS, OR ON EDGE: NOT AT ALL

## 2020-05-12 ASSESSMENT — PATIENT HEALTH QUESTIONNAIRE - PHQ9
SUM OF ALL RESPONSES TO PHQ QUESTIONS 1-9: 7
5. POOR APPETITE OR OVEREATING: SEVERAL DAYS

## 2020-05-12 NOTE — PATIENT INSTRUCTIONS
This is a great risk as we discussed. I still strongly advise the Emergency Room    If youre symptoms worsen you should call 911    You should plan a check in tomorrow or Thursday to go over symptoms and blood sugars    Continue to push fluids    You should slowly increase metformin to 2 tabs twice daily     Insulin for now is very low; we may move up but want to avoid low sugars (<70).    Start your blood pressure medications. First take lisinopril for a few days, then add back amlodipine.         Patient Education     Diet: Diabetes  Food is an important tool that you can use to control diabetes and stay healthy. Eating well-balanced meals in the correct amounts will help you control your blood glucose levels and prevent low blood sugar reactions. It will also help you reduce the health risks of diabetes. There is no one specific diet that is right for everyone with diabetes. But there are general guidelines to follow. A registered dietitian (RD) will create a tailored diet approach that s just right for you. He or she will also help you plan healthy meals and snacks. If you have any questions, call your dietitian for advice.     Guidelines for success  Talk with your healthcare provider before starting a diabetes diet or weight loss program. If you haven't talked with a dietitian yet, ask your provider for a referral. The following guidelines can help you succeed:    Select foods from the 6 food groups below. Your dietitian will help you find food choices within each group. He or she will also show you serving sizes and how many servings you can have at each meal.  ? Grains, beans, and starchy vegetables  ? Vegetables  ? Fruit  ? Milk or yogurt  ? Meat, poultry, fish, or tofu  ? Healthy fats    Check your blood sugar levels as directed by your provider. Take any medicine as prescribed by your provider.    Learn to read food labels and pick the right portion sizes.    Eat only the amount of food in your meal plan.  Eat about the same amount of food at regular times each day. Don t skip meals. Eat meals 4 to 5 hours apart, with snacks in between.    Limit alcohol. It raises blood sugar levels. Drink water or calorie-free diet drinks that use safe sweeteners.    Eat less fat to help lower your risk of heart disease. Use nonfat or low-fat dairy products and lean meats. Avoid fried foods. Use cooking oils that are unsaturated, such as olive, canola, or peanut oil.    Talk with your dietitian about safe sugar substitutes.    Avoid added salt. It can contribute to high blood pressure, which can cause heart disease. People with diabetes already have a risk of high blood pressure and heart disease.    Stay at a healthy weight. If you need to lose weight, cut down on your portion sizes. But don t skip meals. Exercise is an important part of any weight management program. Talk with your provider about an exercise program that s right for you.    For more information about the best diet plan for you, talk with a registered dietitian (RD). To find an RD in your area, contact:  ? Academy of Nutrition and Dietetics www.eatright.org  ? The American Diabetes Association 012-040-9525 www.diabetes.org  Date Last Reviewed: 8/1/2016 2000-2019 [employer]. [address] [place] [address], [place] [address]. All rights reserved. This information is not intended as a substitute for professional medical care. Always follow your healthcare professional's instructions.

## 2020-05-12 NOTE — PROGRESS NOTES
Subjective     Connor Emerson is a 40 year old male who presents to clinic today for the following health issues:    HPI   Dry Mouth and Frequent Urination      Duration: about one month    Description (location/character/radiation): notes multiple symptoms of dry mouth, frequent urination (started every 3 hours and now is every 30 minutes, especially at night, will be awake through the whole night), feeling weak, and not eating well    Intensity:  Moderate-severe; notes urination problems and    Accompanying signs and symptoms: difficulty sleeping    History (similar episodes/previous evaluation): Has DM; has not been taking any of his medications since 1.5 years ago; started his metformin again last week (old dose of 500 mg, one tablet every night since Friday)    Precipitating or alleviating factors: None  Therapies tried and outcome: greatly increasing water intake and starting metformin, has not helped    Did go to eye clinic a couple weeks ago- [facility] signed     Connor notes that he started to feel crummy around one month ago and has gotten much worse over the past week  Urinating every 30 minutes - looks clear  There is no shortness of breath  Saw eye clinic a few weeks ago but some recent blurriness (improved with new contacts)  Some palpitations, most recently last night  Has felt beyond thirsty; drinking tons of water  Has lost at least 7lbs this week  No vomiting or HA    Did recently restart metformin on Friday - 1 tab daily      Patient Active Problem List   Diagnosis     CARDIOVASCULAR SCREENING; LDL GOAL LESS THAN 160     Low back pain     Insomnia     Obesity     HTN, goal below 140/90     Anxiety     GERD (gastroesophageal reflux disease)     Atypical chest pain     Intractable chronic migraine without aura     Type 2 diabetes mellitus without complication (H)     Adjustment disorder with mixed anxiety and depressed mood     Lateral epicondylitis of right elbow     Cellulitis of  "hand, right     Morbid obesity (H)     Past Surgical History:   Procedure Laterality Date     INJECT BLOCK MEDIAL BRANCH CERVICAL/THORACIC/LUMBAR       ORTHOPEDIC SURGERY      Ganesh. Rotator cuff repair.       Social History     Tobacco Use     Smoking status: Never Smoker     Smokeless tobacco: Never Used   Substance Use Topics     Alcohol use: Yes     Alcohol/week: 0.0 standard drinks     Comment: social     Family History   Problem Relation Age of Onset     Unknown/Adopted Mother      Unknown/Adopted Father      Unknown/Adopted Paternal Grandmother      Unknown/Adopted Paternal Grandfather      Unknown/Adopted Maternal Grandfather      Unknown/Adopted Maternal Grandmother            Reviewed and updated as needed this visit by Provider         Review of Systems   Constitutional, HEENT, cardiovascular, pulmonary, gi and gu systems are negative, except as otherwise noted.      Objective    BP (!) 170/116   Pulse 106   Temp 98.6  F (37  C) (Oral)   Resp 16   Ht 1.708 m (5' 7.25\")   Wt 108.9 kg (240 lb)   SpO2 96%   BMI 37.31 kg/m    Body mass index is 37.31 kg/m .  Physical Exam   GENERAL: healthy, alert and no distress  EYES: Eyes grossly normal to inspection, PERRL and conjunctivae and sclerae normal  HENT: oral mucous membranes moist  RESP: lungs clear to auscultation - no rales, rhonchi or wheezes  CV: tachycardia, normal S1 S2, no S3 or S4 and no murmur, click or rub  ABDOMEN: soft, nontender, no hepatosplenomegaly, no masses and bowel sounds normal  MS: No peripheral edema   SKIN: no suspicious lesions or rashes  NEURO: Normal strength and tone, mentation intact and speech normal  PSYCH: mentation appears normal, affect normal/bright    Diagnostic Test Results:  Results for orders placed or performed in visit on 05/12/20 (from the past 24 hour(s))   *UA reflex to Microscopic and Culture ([place] and [facility] (except [place] and [place])    Specimen: Midstream Urine   Result Value Ref Range    " Color Urine Yellow     Appearance Urine Clear     Glucose Urine >=1000 (A) NEG^Negative mg/dL    Bilirubin Urine Negative NEG^Negative    Ketones Urine 15 (A) NEG^Negative mg/dL    Specific Gravity Urine 1.010 1.003 - 1.035    Blood Urine Negative NEG^Negative    pH Urine 5.0 5.0 - 7.0 pH    Protein Albumin Urine Negative NEG^Negative mg/dL    Urobilinogen Urine 0.2 0.2 - 1.0 EU/dL    Nitrite Urine Negative NEG^Negative    Leukocyte Esterase Urine Negative NEG^Negative    Source Midstream Urine    **Basic metabolic panel FUTURE anytime   Result Value Ref Range    Sodium 135 133 - 144 mmol/L    Potassium 3.9 3.4 - 5.3 mmol/L    Chloride 98 94 - 109 mmol/L    Carbon Dioxide PENDING 20 - 32 mmol/L    Anion Gap PENDING 3 - 14 mmol/L    Glucose PENDING 70 - 99 mg/dL    Urea Nitrogen PENDING 7 - 30 mg/dL    Creatinine PENDING 0.66 - 1.25 mg/dL    GFR Estimate PENDING >60 mL/min/[1.73_m2]    GFR Estimate If Black PENDING >60 mL/min/[1.73_m2]    Calcium PENDING 8.5 - 10.1 mg/dL   **A1C FUTURE anytime   Result Value Ref Range    Hemoglobin A1C 11.9 (H) 0 - 5.6 %           Assessment & Plan     1. Type 2 diabetes mellitus with hyperglycemia, without long-term current use of insulin (H)  2. Urinary frequency  3. HTN, goal below 140/90  Connor presents today with increased symptoms over the past month, worsening over the past week. I worried about early DKA and suggested he be seen and stablized at the ED. He refused. He verbalized he accepted the significant risks, up to and including death if he did not go, but he continued to decline. We reviewed the available lab work. A1c is signficantly elevated and his sugars are significant in the urine. HE is urinating regularly. He should get fluids and subsequently insulin drip but again refuses. I will hesitantly start a low dose insulin for him begin with plan to steadily increase, along with his metformin. He will need to restart blood pressure medicaitons. He should continue to  "hydrate extensively. I have asked for close follow up tomorrow to ensure he is stable. He agrees with this paln  - Albumin Random Urine Quantitative with Creat Ratio  - insulin glargine (LANTUS PEN) 100 UNIT/ML pen; Inject 15 Units Subcutaneous At Bedtime  Dispense: 3 mL; Refill: 0  - blood glucose (NO BRAND SPECIFIED) test strip; Use to test blood sugar 1 times daily or as directed.  Dispense: 1 Box; Refill: 0  - blood glucose monitoring (NO BRAND SPECIFIED) meter device kit; Use to test blood sugar 1 times daily or as directed.  Dispense: 1 kit; Refill: 0  - blood glucose (NO BRAND SPECIFIED) lancets standard; Use to test blood sugar 1 times daily or as directed.  Dispense: 100 each; Refill: 0  - **Basic metabolic panel FUTURE anytime  - *UA reflex to Microscopic and Culture ([place] [facility] (except [place] and [place])  - **A1C FUTURE anytime           BMI:   Estimated body mass index is 37.31 kg/m  as calculated from the following:    Height as of this encounter: 1.708 m (5' 7.25\").    Weight as of this encounter: 108.9 kg (240 lb).         Return in about 1 day (around 5/13/2020) for Set up a visit tomorrow to check in.    Jayden King PA-C  [facility]      "

## 2020-05-12 NOTE — TELEPHONE ENCOUNTER
Please call pt.  Submitted e-visit.  He needs to be seen.  Urgent care so labs can be run.  Please help schedule at [facility].  I can manage after we make sure everything is stable.  BLANKA.

## 2020-05-12 NOTE — TELEPHONE ENCOUNTER
Pt seen today.      MEKA Low notified by Rizwana from [facility] pharmacy that the pt needs pen needles ordered also.  Verbal order from Hiram King to order pen needles.      Pen needles were ordered.

## 2020-05-12 NOTE — TELEPHONE ENCOUNTER
Pt called back- started back on Metformin about a week ago.   He is urinating every 30 mins, not sleeping, not eating, drinking constantly- dry mouth. Last labs in 2018.     No fever, no cough, no sob, no chills, no loss of taste/smell, no andrews    BLANKA had wanted to send him to . He does NOT have insurance. Reached out to care coordination- adv to reach out to [facility] (gave print off with info), apply for [facility], or set up payment plan with billing. He says his wife is trying to sign them up for MA.    Olya w/ DN- worried about uncontrolled diabetes. Scheduled here for better follow up and cost.     Pt agreeable and thankful to be seen.     Joselin MODI RN

## 2020-05-13 ASSESSMENT — ANXIETY QUESTIONNAIRES: GAD7 TOTAL SCORE: 2

## 2020-05-18 ENCOUNTER — TELEPHONE (OUTPATIENT)
Dept: FAMILY MEDICINE | Facility: CLINIC | Age: 40
End: 2020-05-18

## 2020-05-18 DIAGNOSIS — E11.65 TYPE 2 DIABETES MELLITUS WITH HYPERGLYCEMIA, WITHOUT LONG-TERM CURRENT USE OF INSULIN (H): Primary | ICD-10-CM

## 2020-05-18 NOTE — TELEPHONE ENCOUNTER
Called patient an he stated he is feeling much better and symptoms have almost gone away- just has a little bit of a dry mouth stated he has been drinking plenty of water. Video visit made with LISHA Paz RN on 5/18/2020 at 12:07 PM

## 2020-05-18 NOTE — TELEPHONE ENCOUNTER
Please call Connor for a wellness check and remind that he was supposed to set up appt with Radha this week to review sugars/update how things were going.

## 2020-05-20 ENCOUNTER — VIRTUAL VISIT (OUTPATIENT)
Dept: FAMILY MEDICINE | Facility: CLINIC | Age: 40
End: 2020-05-20

## 2020-05-20 DIAGNOSIS — Z53.9 NO SHOW: Primary | ICD-10-CM

## 2020-05-20 NOTE — PROGRESS NOTES
"Connor Emerson is a 40 year old male who is being evaluated via a billable video visit.      The patient has been notified of following:     \"This video visit will be conducted via a call between you and your physician/provider. We have found that certain health care needs can be provided without the need for an in-person physical exam.  This service lets us provide the care you need with a video conversation.  If a prescription is necessary we can send it directly to your pharmacy.  If lab work is needed we can place an order for that and you can then stop by our lab to have the test done at a later time.    Video visits are billed at different rates depending on your insurance coverage.  Please reach out to your insurance provider with any questions.    If during the course of the call the physician/provider feels a video visit is not appropriate, you will not be charged for this service.\"    Patient has given verbal consent for Video visit? {YES-NO  Default Yes:4444::\"Yes\"}    How would you like to obtain your AVS? {AVS Preference:088827}    Patient would like the video invitation sent by: {video visit invitation:059268}    Will anyone else be joining your video visit? {:613150}  {If patient encounters technical issues they should call [phone] :624920}    Subjective     Connor Emerson is a 40 year old male who presents today via video visit for the following health issues:    HPI  Diabetes Follow-up      How often are you checking your blood sugar? { :632582}    What concerns do you have today about your diabetes? { :755484::\"None\"}     Do you have any of these symptoms? (Select all that apply)  { :956030}    Have you had a diabetic eye exam in the last 12 months? { :010439}        BP Readings from Last 2 Encounters:   05/12/20 (!) 170/116   12/11/18 (!) 150/100     Hemoglobin A1C (%)   Date Value   05/12/2020 11.9 (H)   08/10/2018 6.3 (H)     LDL Cholesterol Calculated (mg/dL)   Date Value   08/10/2018 121 " "(H)   06/29/2017 102 (H)       {Reference  Diabetes Management Resources :128803}    {Reference  Diabetes Log - 7 days :792823}    Hypertension Follow-up      Do you check your blood pressure regularly outside of the clinic? { :861509}     Are you following a low salt diet? { :453078}    Are your blood pressures ever more than 140 on the top number (systolic) OR more   than 90 on the bottom number (diastolic), for example 140/90? { :289664}      How many servings of fruits and vegetables do you eat daily?  { :225501}    On average, how many sweetened beverages do you drink each day (Examples: soda, juice, sweet tea, etc.  Do NOT count diet or artificially sweetened beverages)?   { 1-11:940564}    How many days per week do you exercise enough to make your heart beat faster? { :580785}    How many minutes a day do you exercise enough to make your heart beat faster? { :112670}    How many days per week do you miss taking your medication? {0-7 :551840}    {PEDS Chronic and Acute Problems (Optional):054082}     Video Start Time: {video visit start/end time for provider to select:936983}    {additonal problems for provider to add (Optional):830712}    {HIST REVIEW/ LINKS 2 (Optional):256029}    Reviewed and updated as needed this visit by Provider         Review of Systems   {ROS COMP (Optional):896537}      Objective    There were no vitals taken for this visit.  Estimated body mass index is 37.31 kg/m  as calculated from the following:    Height as of 5/12/20: 1.708 m (5' 7.25\").    Weight as of 5/12/20: 108.9 kg (240 lb).  Physical Exam     {video visit exam brief selected:106952::\"GENERAL: Healthy, alert and no distress\",\"EYES: Eyes grossly normal to inspection.  No discharge or erythema, or obvious scleral/conjunctival abnormalities.\",\"RESP: No audible wheeze, cough, or visible cyanosis.  No visible retractions or increased work of breathing.  \",\"SKIN: Visible skin clear. No significant rash, abnormal pigmentation " "or lesions.\",\"NEURO: Cranial nerves grossly intact.  Mentation and speech appropriate for age.\",\"PSYCH: Mentation appears normal, affect normal/bright, judgement and insight intact, normal speech and appearance well-groomed.\"}      {Diagnostic Test Results (Optional):157633::\"Diagnostic Test Results:\",\"Labs reviewed in Epic\"}        {PROVIDER CHARTING PREFERENCE:867101}      Video-Visit Details    Type of service:  Video Visit    Video End Time:{video visit start/end time for provider to select:976125}    Originating Location (pt. Location): {video visit patient location:998826::\"Home\"}    Distant Location (provider location):  [facility]     Platform used for Video Visit: {Virtual Visit Platforms:933863::\"AmWell\"}    No follow-ups on file.       {signature options:484094}        "

## 2020-11-22 ENCOUNTER — HEALTH MAINTENANCE LETTER (OUTPATIENT)
Age: 40
End: 2020-11-22

## 2021-04-04 ENCOUNTER — HEALTH MAINTENANCE LETTER (OUTPATIENT)
Age: 41
End: 2021-04-04

## 2021-05-29 ENCOUNTER — HEALTH MAINTENANCE LETTER (OUTPATIENT)
Age: 41
End: 2021-05-29

## 2021-09-18 ENCOUNTER — HEALTH MAINTENANCE LETTER (OUTPATIENT)
Age: 41
End: 2021-09-18

## 2022-01-08 ENCOUNTER — HEALTH MAINTENANCE LETTER (OUTPATIENT)
Age: 42
End: 2022-01-08

## 2022-01-19 ENCOUNTER — APPOINTMENT (OUTPATIENT)
Dept: GENERAL RADIOLOGY | Facility: CLINIC | Age: 42
DRG: 186 | End: 2022-01-19
Attending: EMERGENCY MEDICINE

## 2022-01-19 ENCOUNTER — APPOINTMENT (OUTPATIENT)
Dept: CT IMAGING | Facility: CLINIC | Age: 42
DRG: 186 | End: 2022-01-19
Attending: EMERGENCY MEDICINE

## 2022-01-19 ENCOUNTER — HOSPITAL ENCOUNTER (INPATIENT)
Facility: CLINIC | Age: 42
LOS: 3 days | Discharge: HOME | DRG: 186 | End: 2022-01-22
Attending: EMERGENCY MEDICINE | Admitting: INTERNAL MEDICINE

## 2022-01-19 DIAGNOSIS — J90 PLEURAL EFFUSION ON RIGHT: ICD-10-CM

## 2022-01-19 LAB
ALBUMIN SERPL-MCNC: 3 G/DL (ref 3.4–5)
ALP SERPL-CCNC: 87 U/L (ref 40–150)
ALT SERPL W P-5'-P-CCNC: 24 U/L (ref 0–70)
ANION GAP SERPL CALCULATED.3IONS-SCNC: 3 MMOL/L (ref 3–14)
AST SERPL W P-5'-P-CCNC: 12 U/L (ref 0–45)
BASE EXCESS BLDV CALC-SCNC: 4.3 MMOL/L (ref -7.7–1.9)
BASOPHILS # BLD AUTO: 0.1 10E3/UL (ref 0–0.2)
BASOPHILS NFR BLD AUTO: 1 %
BILIRUB DIRECT SERPL-MCNC: 0.2 MG/DL (ref 0–0.2)
BILIRUB SERPL-MCNC: 0.5 MG/DL (ref 0.2–1.3)
BUN SERPL-MCNC: 14 MG/DL (ref 7–30)
CALCIUM SERPL-MCNC: 9.1 MG/DL (ref 8.5–10.1)
CHLORIDE BLD-SCNC: 104 MMOL/L (ref 94–109)
CO2 SERPL-SCNC: 30 MMOL/L (ref 20–32)
CREAT SERPL-MCNC: 0.56 MG/DL (ref 0.66–1.25)
EOSINOPHIL # BLD AUTO: 0.4 10E3/UL (ref 0–0.7)
EOSINOPHIL NFR BLD AUTO: 4 %
ERYTHROCYTE [DISTWIDTH] IN BLOOD BY AUTOMATED COUNT: 12.1 % (ref 10–15)
FLUAV RNA SPEC QL NAA+PROBE: NEGATIVE
FLUBV RNA RESP QL NAA+PROBE: NEGATIVE
GFR SERPL CREATININE-BSD FRML MDRD: >90 ML/MIN/1.73M2
GLUCOSE BLD-MCNC: 399 MG/DL (ref 70–99)
GLUCOSE FLD-MCNC: 328 MG/DL
HBA1C MFR BLD: 10.1 % (ref 0–5.6)
HCO3 BLDV-SCNC: 29 MMOL/L (ref 21–28)
HCT VFR BLD AUTO: 49.4 % (ref 40–53)
HGB BLD-MCNC: 15.9 G/DL (ref 13.3–17.7)
HOLD SPECIMEN: NORMAL
HOLD SPECIMEN: NORMAL
IMM GRANULOCYTES # BLD: 0 10E3/UL
IMM GRANULOCYTES NFR BLD: 0 %
LDH FLD L TO P-CCNC: 213 U/L
LDH SERPL L TO P-CCNC: 187 U/L (ref 85–227)
LYMPHOCYTES # BLD AUTO: 1.4 10E3/UL (ref 0.8–5.3)
LYMPHOCYTES NFR BLD AUTO: 14 %
MCH RBC QN AUTO: 29.7 PG (ref 26.5–33)
MCHC RBC AUTO-ENTMCNC: 32.2 G/DL (ref 31.5–36.5)
MCV RBC AUTO: 92 FL (ref 78–100)
MONOCYTES # BLD AUTO: 0.8 10E3/UL (ref 0–1.3)
MONOCYTES NFR BLD AUTO: 8 %
NEUTROPHILS # BLD AUTO: 7.5 10E3/UL (ref 1.6–8.3)
NEUTROPHILS NFR BLD AUTO: 73 %
NRBC # BLD AUTO: 0 10E3/UL
NRBC BLD AUTO-RTO: 0 /100
O2/TOTAL GAS SETTING VFR VENT: 35 %
OXYHGB MFR BLDV: 84 % (ref 70–75)
PCO2 BLDV: 43 MM HG (ref 40–50)
PH BLDV: 7.44 [PH] (ref 7.32–7.43)
PLATELET # BLD AUTO: 381 10E3/UL (ref 150–450)
PO2 BLDV: 49 MM HG (ref 25–47)
POTASSIUM BLD-SCNC: 4 MMOL/L (ref 3.4–5.3)
PROT FLD-MCNC: 4.3 G/DL
PROT SERPL-MCNC: 7 G/DL (ref 6.8–8.8)
PROT SERPL-MCNC: 7.2 G/DL (ref 6.8–8.8)
RBC # BLD AUTO: 5.36 10E6/UL (ref 4.4–5.9)
SARS-COV-2 RNA RESP QL NAA+PROBE: NEGATIVE
SODIUM SERPL-SCNC: 137 MMOL/L (ref 133–144)
TROPONIN I SERPL HS-MCNC: 5 NG/L
TSH SERPL DL<=0.005 MIU/L-ACNC: 1.81 MU/L (ref 0.4–4)
WBC # BLD AUTO: 10.1 10E3/UL (ref 4–11)

## 2022-01-19 PROCEDURE — 258N000003 HC RX IP 258 OP 636: Performed by: EMERGENCY MEDICINE

## 2022-01-19 PROCEDURE — 84443 ASSAY THYROID STIM HORMONE: CPT | Performed by: INTERNAL MEDICINE

## 2022-01-19 PROCEDURE — 80053 COMPREHEN METABOLIC PANEL: CPT | Performed by: EMERGENCY MEDICINE

## 2022-01-19 PROCEDURE — 87636 SARSCOV2 & INF A&B AMP PRB: CPT | Performed by: EMERGENCY MEDICINE

## 2022-01-19 PROCEDURE — 84157 ASSAY OF PROTEIN OTHER: CPT | Performed by: EMERGENCY MEDICINE

## 2022-01-19 PROCEDURE — 0W993ZZ DRAINAGE OF RIGHT PLEURAL CAVITY, PERCUTANEOUS APPROACH: ICD-10-PCS | Performed by: EMERGENCY MEDICINE

## 2022-01-19 PROCEDURE — 250N000011 HC RX IP 250 OP 636: Performed by: EMERGENCY MEDICINE

## 2022-01-19 PROCEDURE — 88305 TISSUE EXAM BY PATHOLOGIST: CPT | Mod: TC | Performed by: EMERGENCY MEDICINE

## 2022-01-19 PROCEDURE — 93005 ELECTROCARDIOGRAM TRACING: CPT

## 2022-01-19 PROCEDURE — 120N000001 HC R&B MED SURG/OB

## 2022-01-19 PROCEDURE — 999N000065 XR CHEST PORT 1 VIEW

## 2022-01-19 PROCEDURE — 99285 EMERGENCY DEPT VISIT HI MDM: CPT | Mod: 25

## 2022-01-19 PROCEDURE — 250N000009 HC RX 250: Performed by: EMERGENCY MEDICINE

## 2022-01-19 PROCEDURE — 71275 CT ANGIOGRAPHY CHEST: CPT

## 2022-01-19 PROCEDURE — 83036 HEMOGLOBIN GLYCOSYLATED A1C: CPT | Performed by: INTERNAL MEDICINE

## 2022-01-19 PROCEDURE — 83615 LACTATE (LD) (LDH) ENZYME: CPT | Performed by: EMERGENCY MEDICINE

## 2022-01-19 PROCEDURE — 89051 BODY FLUID CELL COUNT: CPT | Performed by: EMERGENCY MEDICINE

## 2022-01-19 PROCEDURE — 99223 1ST HOSP IP/OBS HIGH 75: CPT | Mod: AI | Performed by: INTERNAL MEDICINE

## 2022-01-19 PROCEDURE — 32555 ASPIRATE PLEURA W/ IMAGING: CPT

## 2022-01-19 PROCEDURE — 83615 LACTATE (LD) (LDH) ENZYME: CPT | Performed by: INTERNAL MEDICINE

## 2022-01-19 PROCEDURE — 85025 COMPLETE CBC W/AUTO DIFF WBC: CPT | Performed by: EMERGENCY MEDICINE

## 2022-01-19 PROCEDURE — 272N000030 HC KIT THORACIC/CHEST DRAIN

## 2022-01-19 PROCEDURE — 96374 THER/PROPH/DIAG INJ IV PUSH: CPT | Mod: 59

## 2022-01-19 PROCEDURE — 36415 COLL VENOUS BLD VENIPUNCTURE: CPT | Performed by: EMERGENCY MEDICINE

## 2022-01-19 PROCEDURE — 82805 BLOOD GASES W/O2 SATURATION: CPT | Performed by: EMERGENCY MEDICINE

## 2022-01-19 PROCEDURE — 96361 HYDRATE IV INFUSION ADD-ON: CPT

## 2022-01-19 PROCEDURE — 82945 GLUCOSE OTHER FLUID: CPT | Performed by: EMERGENCY MEDICINE

## 2022-01-19 PROCEDURE — 250N000009 HC RX 250

## 2022-01-19 PROCEDURE — 84484 ASSAY OF TROPONIN QUANT: CPT | Performed by: EMERGENCY MEDICINE

## 2022-01-19 PROCEDURE — 82248 BILIRUBIN DIRECT: CPT | Performed by: EMERGENCY MEDICINE

## 2022-01-19 PROCEDURE — C9803 HOPD COVID-19 SPEC COLLECT: HCPCS

## 2022-01-19 RX ORDER — IBUPROFEN 600 MG/1
600 TABLET, FILM COATED ORAL EVERY 6 HOURS PRN
Status: DISCONTINUED | OUTPATIENT
Start: 2022-01-19 | End: 2022-01-22 | Stop reason: HOSPADM

## 2022-01-19 RX ORDER — NITROGLYCERIN 0.4 MG/1
0.4 TABLET SUBLINGUAL EVERY 5 MIN PRN
Status: DISCONTINUED | OUTPATIENT
Start: 2022-01-19 | End: 2022-01-22 | Stop reason: HOSPADM

## 2022-01-19 RX ORDER — CEFTRIAXONE 2 G/1
2 INJECTION, POWDER, FOR SOLUTION INTRAMUSCULAR; INTRAVENOUS EVERY 24 HOURS
Status: DISCONTINUED | OUTPATIENT
Start: 2022-01-19 | End: 2022-01-22 | Stop reason: HOSPADM

## 2022-01-19 RX ORDER — ACETAMINOPHEN 325 MG/1
650 TABLET ORAL EVERY 6 HOURS PRN
Status: DISCONTINUED | OUTPATIENT
Start: 2022-01-19 | End: 2022-01-22 | Stop reason: HOSPADM

## 2022-01-19 RX ORDER — IPRATROPIUM BROMIDE AND ALBUTEROL SULFATE 2.5; .5 MG/3ML; MG/3ML
SOLUTION RESPIRATORY (INHALATION)
Status: COMPLETED
Start: 2022-01-19 | End: 2022-01-19

## 2022-01-19 RX ORDER — LIDOCAINE HYDROCHLORIDE AND EPINEPHRINE 10; 10 MG/ML; UG/ML
1 INJECTION, SOLUTION INFILTRATION; PERINEURAL ONCE
Status: DISCONTINUED | OUTPATIENT
Start: 2022-01-19 | End: 2022-01-22 | Stop reason: HOSPADM

## 2022-01-19 RX ORDER — LIDOCAINE 40 MG/G
CREAM TOPICAL
Status: DISCONTINUED | OUTPATIENT
Start: 2022-01-19 | End: 2022-01-22 | Stop reason: HOSPADM

## 2022-01-19 RX ORDER — PROCHLORPERAZINE MALEATE 5 MG
10 TABLET ORAL EVERY 6 HOURS PRN
Status: DISCONTINUED | OUTPATIENT
Start: 2022-01-19 | End: 2022-01-22 | Stop reason: HOSPADM

## 2022-01-19 RX ORDER — AZITHROMYCIN 500 MG/5ML
500 INJECTION, POWDER, LYOPHILIZED, FOR SOLUTION INTRAVENOUS EVERY 24 HOURS
Status: COMPLETED | OUTPATIENT
Start: 2022-01-19 | End: 2022-01-20

## 2022-01-19 RX ORDER — NICOTINE POLACRILEX 4 MG
15-30 LOZENGE BUCCAL
Status: DISCONTINUED | OUTPATIENT
Start: 2022-01-19 | End: 2022-01-22 | Stop reason: HOSPADM

## 2022-01-19 RX ORDER — KETOROLAC TROMETHAMINE 30 MG/ML
30 INJECTION, SOLUTION INTRAMUSCULAR; INTRAVENOUS ONCE
Status: COMPLETED | OUTPATIENT
Start: 2022-01-19 | End: 2022-01-19

## 2022-01-19 RX ORDER — LIDOCAINE HYDROCHLORIDE AND EPINEPHRINE 10; 10 MG/ML; UG/ML
INJECTION, SOLUTION INFILTRATION; PERINEURAL
Status: DISCONTINUED
Start: 2022-01-19 | End: 2022-01-20 | Stop reason: HOSPADM

## 2022-01-19 RX ORDER — ONDANSETRON 2 MG/ML
4 INJECTION INTRAMUSCULAR; INTRAVENOUS EVERY 6 HOURS PRN
Status: DISCONTINUED | OUTPATIENT
Start: 2022-01-19 | End: 2022-01-22 | Stop reason: HOSPADM

## 2022-01-19 RX ORDER — PROCHLORPERAZINE 25 MG
25 SUPPOSITORY, RECTAL RECTAL EVERY 12 HOURS PRN
Status: DISCONTINUED | OUTPATIENT
Start: 2022-01-19 | End: 2022-01-22 | Stop reason: HOSPADM

## 2022-01-19 RX ORDER — DEXTROSE MONOHYDRATE 25 G/50ML
25-50 INJECTION, SOLUTION INTRAVENOUS
Status: DISCONTINUED | OUTPATIENT
Start: 2022-01-19 | End: 2022-01-22 | Stop reason: HOSPADM

## 2022-01-19 RX ORDER — IOPAMIDOL 755 MG/ML
500 INJECTION, SOLUTION INTRAVASCULAR ONCE
Status: COMPLETED | OUTPATIENT
Start: 2022-01-19 | End: 2022-01-19

## 2022-01-19 RX ORDER — ONDANSETRON 4 MG/1
4 TABLET, ORALLY DISINTEGRATING ORAL EVERY 6 HOURS PRN
Status: DISCONTINUED | OUTPATIENT
Start: 2022-01-19 | End: 2022-01-22 | Stop reason: HOSPADM

## 2022-01-19 RX ORDER — IPRATROPIUM BROMIDE AND ALBUTEROL SULFATE 2.5; .5 MG/3ML; MG/3ML
3 SOLUTION RESPIRATORY (INHALATION) ONCE
Status: COMPLETED | OUTPATIENT
Start: 2022-01-19 | End: 2022-01-19

## 2022-01-19 RX ORDER — ACETAMINOPHEN 650 MG/1
650 SUPPOSITORY RECTAL EVERY 6 HOURS PRN
Status: DISCONTINUED | OUTPATIENT
Start: 2022-01-19 | End: 2022-01-22 | Stop reason: HOSPADM

## 2022-01-19 RX ADMIN — SODIUM CHLORIDE 1000 ML: 9 INJECTION, SOLUTION INTRAVENOUS at 20:04

## 2022-01-19 RX ADMIN — IPRATROPIUM BROMIDE AND ALBUTEROL SULFATE 3 ML: 2.5; .5 SOLUTION RESPIRATORY (INHALATION) at 20:02

## 2022-01-19 RX ADMIN — KETOROLAC TROMETHAMINE 30 MG: 30 INJECTION, SOLUTION INTRAMUSCULAR at 20:03

## 2022-01-19 RX ADMIN — IPRATROPIUM BROMIDE AND ALBUTEROL SULFATE 3 ML: .5; 3 SOLUTION RESPIRATORY (INHALATION) at 20:02

## 2022-01-19 RX ADMIN — SODIUM CHLORIDE 90 ML: 9 INJECTION, SOLUTION INTRAVENOUS at 21:20

## 2022-01-19 RX ADMIN — IOPAMIDOL 73 ML: 755 INJECTION, SOLUTION INTRAVENOUS at 21:20

## 2022-01-19 ASSESSMENT — ACTIVITIES OF DAILY LIVING (ADL): ADLS_ACUITY_SCORE: 12

## 2022-01-20 ENCOUNTER — APPOINTMENT (OUTPATIENT)
Dept: CARDIOLOGY | Facility: CLINIC | Age: 42
DRG: 186 | End: 2022-01-20
Attending: INTERNAL MEDICINE

## 2022-01-20 ENCOUNTER — APPOINTMENT (OUTPATIENT)
Dept: ULTRASOUND IMAGING | Facility: CLINIC | Age: 42
DRG: 186 | End: 2022-01-20
Attending: INTERNAL MEDICINE

## 2022-01-20 LAB
ALBUMIN SERPL-MCNC: 2.2 G/DL (ref 3.4–5)
ALP SERPL-CCNC: 69 U/L (ref 40–150)
ALT SERPL W P-5'-P-CCNC: 18 U/L (ref 0–70)
ANION GAP SERPL CALCULATED.3IONS-SCNC: 3 MMOL/L (ref 3–14)
APPEARANCE FLD: ABNORMAL
AST SERPL W P-5'-P-CCNC: 8 U/L (ref 0–45)
ATRIAL RATE - MUSE: 122 BPM
BILIRUB SERPL-MCNC: 0.3 MG/DL (ref 0.2–1.3)
BUN SERPL-MCNC: 12 MG/DL (ref 7–30)
CALCIUM SERPL-MCNC: 8.2 MG/DL (ref 8.5–10.1)
CHLORIDE BLD-SCNC: 109 MMOL/L (ref 94–109)
CO2 SERPL-SCNC: 29 MMOL/L (ref 20–32)
COLOR FLD: ABNORMAL
CREAT SERPL-MCNC: 0.52 MG/DL (ref 0.66–1.25)
DIASTOLIC BLOOD PRESSURE - MUSE: NORMAL MMHG
EOSINOPHIL NFR FLD MANUAL: 9 %
ERYTHROCYTE [DISTWIDTH] IN BLOOD BY AUTOMATED COUNT: 12.1 % (ref 10–15)
GFR SERPL CREATININE-BSD FRML MDRD: >90 ML/MIN/1.73M2
GLUCOSE BLD-MCNC: 239 MG/DL (ref 70–99)
GLUCOSE BLDC GLUCOMTR-MCNC: 203 MG/DL (ref 70–99)
GLUCOSE BLDC GLUCOMTR-MCNC: 227 MG/DL (ref 70–99)
GLUCOSE BLDC GLUCOMTR-MCNC: 248 MG/DL (ref 70–99)
GLUCOSE BLDC GLUCOMTR-MCNC: 280 MG/DL (ref 70–99)
GLUCOSE BLDC GLUCOMTR-MCNC: 335 MG/DL (ref 70–99)
HCT VFR BLD AUTO: 43.4 % (ref 40–53)
HGB BLD-MCNC: 14 G/DL (ref 13.3–17.7)
INTERPRETATION ECG - MUSE: NORMAL
LVEF ECHO: NORMAL
LYMPHOCYTES NFR FLD MANUAL: 36 %
MCH RBC QN AUTO: 30 PG (ref 26.5–33)
MCHC RBC AUTO-ENTMCNC: 32.3 G/DL (ref 31.5–36.5)
MCV RBC AUTO: 93 FL (ref 78–100)
MONOS+MACROS NFR FLD MANUAL: 15 %
NEUTS BAND NFR FLD MANUAL: 34 %
OTHER CELLS FLD MANUAL: 6 %
P AXIS - MUSE: 53 DEGREES
PLATELET # BLD AUTO: 278 10E3/UL (ref 150–450)
POTASSIUM BLD-SCNC: 3.8 MMOL/L (ref 3.4–5.3)
PR INTERVAL - MUSE: 126 MS
PROT SERPL-MCNC: 5.7 G/DL (ref 6.8–8.8)
QRS DURATION - MUSE: 70 MS
QT - MUSE: 310 MS
QTC - MUSE: 441 MS
R AXIS - MUSE: 87 DEGREES
RBC # BLD AUTO: 4.67 10E6/UL (ref 4.4–5.9)
SODIUM SERPL-SCNC: 141 MMOL/L (ref 133–144)
SYSTOLIC BLOOD PRESSURE - MUSE: NORMAL MMHG
T AXIS - MUSE: 61 DEGREES
VENTRICULAR RATE- MUSE: 122 BPM
WBC # BLD AUTO: 7.8 10E3/UL (ref 4–11)
WBC # FLD AUTO: 1755 /UL

## 2022-01-20 PROCEDURE — 89051 BODY FLUID CELL COUNT: CPT | Performed by: INTERNAL MEDICINE

## 2022-01-20 PROCEDURE — 82040 ASSAY OF SERUM ALBUMIN: CPT | Performed by: INTERNAL MEDICINE

## 2022-01-20 PROCEDURE — 88305 TISSUE EXAM BY PATHOLOGIST: CPT | Mod: TC | Performed by: INTERNAL MEDICINE

## 2022-01-20 PROCEDURE — 83615 LACTATE (LD) (LDH) ENZYME: CPT | Performed by: INTERNAL MEDICINE

## 2022-01-20 PROCEDURE — 36415 COLL VENOUS BLD VENIPUNCTURE: CPT | Performed by: INTERNAL MEDICINE

## 2022-01-20 PROCEDURE — 250N000011 HC RX IP 250 OP 636: Performed by: INTERNAL MEDICINE

## 2022-01-20 PROCEDURE — 93306 TTE W/DOPPLER COMPLETE: CPT | Mod: 26 | Performed by: INTERNAL MEDICINE

## 2022-01-20 PROCEDURE — 84157 ASSAY OF PROTEIN OTHER: CPT | Performed by: INTERNAL MEDICINE

## 2022-01-20 PROCEDURE — 250N000012 HC RX MED GY IP 250 OP 636 PS 637: Performed by: INTERNAL MEDICINE

## 2022-01-20 PROCEDURE — 250N000013 HC RX MED GY IP 250 OP 250 PS 637: Performed by: INTERNAL MEDICINE

## 2022-01-20 PROCEDURE — 250N000009 HC RX 250: Performed by: RADIOLOGY

## 2022-01-20 PROCEDURE — 0W993ZZ DRAINAGE OF RIGHT PLEURAL CAVITY, PERCUTANEOUS APPROACH: ICD-10-PCS | Performed by: RADIOLOGY

## 2022-01-20 PROCEDURE — 87205 SMEAR GRAM STAIN: CPT | Performed by: INTERNAL MEDICINE

## 2022-01-20 PROCEDURE — 272N000706 US THORACENTESIS

## 2022-01-20 PROCEDURE — 82945 GLUCOSE OTHER FLUID: CPT | Performed by: INTERNAL MEDICINE

## 2022-01-20 PROCEDURE — 99232 SBSQ HOSP IP/OBS MODERATE 35: CPT | Performed by: INTERNAL MEDICINE

## 2022-01-20 PROCEDURE — 258N000003 HC RX IP 258 OP 636: Performed by: INTERNAL MEDICINE

## 2022-01-20 PROCEDURE — 93306 TTE W/DOPPLER COMPLETE: CPT

## 2022-01-20 PROCEDURE — 120N000001 HC R&B MED SURG/OB

## 2022-01-20 PROCEDURE — 99207 PR CDG-MDM COMPONENT: MEETS MODERATE - DOWN CODED: CPT | Performed by: INTERNAL MEDICINE

## 2022-01-20 PROCEDURE — 85027 COMPLETE CBC AUTOMATED: CPT | Performed by: INTERNAL MEDICINE

## 2022-01-20 PROCEDURE — 80053 COMPREHEN METABOLIC PANEL: CPT | Performed by: INTERNAL MEDICINE

## 2022-01-20 RX ORDER — DEXTROSE MONOHYDRATE 25 G/50ML
25-50 INJECTION, SOLUTION INTRAVENOUS
Status: DISCONTINUED | OUTPATIENT
Start: 2022-01-20 | End: 2022-01-20

## 2022-01-20 RX ORDER — NICOTINE POLACRILEX 4 MG
15-30 LOZENGE BUCCAL
Status: DISCONTINUED | OUTPATIENT
Start: 2022-01-20 | End: 2022-01-20

## 2022-01-20 RX ADMIN — INSULIN ASPART 4 UNITS: 100 INJECTION, SOLUTION INTRAVENOUS; SUBCUTANEOUS at 01:12

## 2022-01-20 RX ADMIN — INSULIN ASPART 2 UNITS: 100 INJECTION, SOLUTION INTRAVENOUS; SUBCUTANEOUS at 08:44

## 2022-01-20 RX ADMIN — INSULIN ASPART 3 UNITS: 100 INJECTION, SOLUTION INTRAVENOUS; SUBCUTANEOUS at 04:41

## 2022-01-20 RX ADMIN — IBUPROFEN 600 MG: 600 TABLET, FILM COATED ORAL at 13:18

## 2022-01-20 RX ADMIN — SODIUM CHLORIDE 500 ML: 9 INJECTION, SOLUTION INTRAVENOUS at 00:49

## 2022-01-20 RX ADMIN — AZITHROMYCIN MONOHYDRATE 250 MG: 500 INJECTION, POWDER, LYOPHILIZED, FOR SOLUTION INTRAVENOUS at 22:14

## 2022-01-20 RX ADMIN — LIDOCAINE HYDROCHLORIDE 10 ML: 10 INJECTION, SOLUTION EPIDURAL; INFILTRATION; INTRACAUDAL; PERINEURAL at 09:26

## 2022-01-20 RX ADMIN — CEFTRIAXONE 2 G: 2 INJECTION, POWDER, FOR SOLUTION INTRAMUSCULAR; INTRAVENOUS at 00:49

## 2022-01-20 RX ADMIN — AZITHROMYCIN MONOHYDRATE 500 MG: 500 INJECTION, POWDER, LYOPHILIZED, FOR SOLUTION INTRAVENOUS at 02:35

## 2022-01-20 ASSESSMENT — ACTIVITIES OF DAILY LIVING (ADL)
ADLS_ACUITY_SCORE: 3
ADLS_ACUITY_SCORE: 12

## 2022-01-20 ASSESSMENT — MIFFLIN-ST. JEOR: SCORE: 1923.66

## 2022-01-20 NOTE — ED PROVIDER NOTES
History     Chief Complaint:    Shortness of Breath       HPI   Connor Emerson is a 41 year old male who presents with presents emergency department with shortness of breath.  Patient reports that last Thursday he began getting ill and feeling shortness of breath.  He has had a little bit of cough, no fevers no nausea no vomiting.  No diarrhea predominantly shortness of breath symptoms.  No leg pain or swelling.  He is not vaccinated for COVID-19.  Was feeling worse today, somewhat at home had an albuterol nebulizer and he tried that prior to coming to the ED but did not feel much better.  Denies cigarette smoking..    Allergies:  Vicodin [Hydrocodone-Acetaminophen]     Medications:    blood glucose (NO BRAND SPECIFIED) lancets standard  blood glucose (NO BRAND SPECIFIED) test strip  blood glucose monitoring (NO BRAND SPECIFIED) meter device kit  insulin glargine (LANTUS PEN) 100 UNIT/ML pen  insulin pen needle (31G X 8 MM) 31G X 8 MM miscellaneous        Past Medical History:    Past Medical History:   Diagnosis Date     Atypical chest pain 12/2/2013     Diabetes (H)      GERD (gastroesophageal reflux disease) 12/2/2013     HTN (hypertension) 5/14/2012     HTN, goal below 140/90 7/2/2013     Insomnia 2/21/2012     Migraine headache 7/2/2013     Migraine with aura, without mention of intractable migraine without mention of status migrainosus        Patient Active Problem List    Diagnosis Date Noted     Morbid obesity (H) 11/27/2017     Priority: Medium     Cellulitis of hand, right 04/27/2017     Priority: Medium     Lateral epicondylitis of right elbow 11/11/2016     Priority: Medium     Adjustment disorder with mixed anxiety and depressed mood 04/23/2016     Priority: Medium     Type 2 diabetes mellitus without complication (H) 10/27/2015     Priority: Medium     Intractable chronic migraine without aura 01/08/2015     Priority: Medium     Problem list name updated by automated process. Provider to review        "GERD (gastroesophageal reflux disease) 12/02/2013     Priority: Medium     Atypical chest pain 12/02/2013     Priority: Medium     Anxiety 07/09/2013     Priority: Medium     HTN, goal below 140/90 07/02/2013     Priority: Medium     Obesity 11/26/2012     Priority: Medium     Insomnia 02/21/2012     Priority: Medium     Low back pain 02/04/2012     Priority: Medium     Diagnosis updated by automated process. Provider to review and confirm.       CARDIOVASCULAR SCREENING; LDL GOAL LESS THAN 160 10/28/2011     Priority: Medium        Past Surgical History:    Past Surgical History:   Procedure Laterality Date     INJECT BLOCK MEDIAL BRANCH CERVICAL/THORACIC/LUMBAR       ORTHOPEDIC SURGERY      Ganesh. Rotator cuff repair.        Family History:    family history includes Unknown/Adopted in his father, maternal grandfather, maternal grandmother, mother, paternal grandfather, and paternal grandmother.    Social History:   reports that he has never smoked. He has never used smokeless tobacco. He reports current alcohol use. He reports that he does not use drugs.    PCP: Radha Shetty Ra     Review of Systems   All other systems reviewed and are negative.        Physical Exam     Patient Vitals for the past 24 hrs:   BP Temp Temp src Pulse Resp SpO2 Height   01/19/22 2145 -- -- -- -- -- 97 % --   01/19/22 1952 (!) 189/133 100.2  F (37.9  C) Oral (!) 121 26 95 % 1.753 m (5' 9\")        Physical Exam    Gen: well appearing, in no acute distress  Oral : Mucous membranes moist,   Nose: No rhinorhea, nasal cannula in place  Ears: External near normal, without drainage  Eyes: periorbital tissues and sclera normal   Neck: supple, no abnormal swelling  Lungs:scant wheezing bilaterally, tachypneic  CV: Tachycardic rate  Abd: soft, nontender, nondistended, no rebound/guarding  Ext: no lower extremity edema  Skin: warm, dry, well perfused, no rashes/bruising/lesions on exposed skin  Neuro: alert, no gross motor or sensory deficits, "   Psych: pleasant mood, normal affect      Emergency Department Course     ECG  Sinus tachycardia rate 122, , no ST segment changes or T inversions concerning for acute ischemia    Imaging:    CT Chest Pulmonary Embolism w Contrast   Final Result   IMPRESSION:   1.  No pulmonary embolism.      2.  Massive right pleural effusion with right to left mediastinal shift. Underlying compressive consolidation right lung. Mucus or debris right lower lobe bronchus.      3.  Several indeterminate nodules in the left lung suspicious for possible metastases.      4.  Subcarinal lymphadenopathy.      NOTE: ABNORMAL REPORT      THE DICTATION ABOVE DESCRIBES AN ABNORMALITY FOR WHICH FOLLOW-UP IS NEEDED.       XR Chest Port 1 View    (Results Pending)        Laboratory:    Labs Ordered and Resulted from Time of ED Arrival to Time of ED Departure   BASIC METABOLIC PANEL - Abnormal       Result Value    Sodium 137      Potassium 4.0      Chloride 104      Carbon Dioxide (CO2) 30      Anion Gap 3      Urea Nitrogen 14      Creatinine 0.56 (*)     Calcium 9.1      Glucose 399 (*)     GFR Estimate >90     BLOOD GAS VENOUS WITH OXYHEMOGLOBIN - Abnormal    pH Venous 7.44 (*)     pCO2 Venous 43      pO2 Venous 49 (*)     Bicarbonate Venous 29 (*)     FIO2 35      Oxyhemoglobin Venous 84 (*)     Base Excess/Deficit (+/-) 4.3 (*)    HEPATIC FUNCTION PANEL - Abnormal    Bilirubin Total 0.5      Bilirubin Direct 0.2      Protein Total 7.2      Albumin 3.0 (*)     Alkaline Phosphatase 87      AST 12      ALT 24     TROPONIN I - Normal    Troponin I High Sensitivity 5     CBC WITH PLATELETS AND DIFFERENTIAL    WBC Count 10.1      RBC Count 5.36      Hemoglobin 15.9      Hematocrit 49.4      MCV 92      MCH 29.7      MCHC 32.2      RDW 12.1      Platelet Count 381      % Neutrophils 73      % Lymphocytes 14      % Monocytes 8      % Eosinophils 4      % Basophils 1      % Immature Granulocytes 0      NRBCs per 100 WBC 0      Absolute  Neutrophils 7.5      Absolute Lymphocytes 1.4      Absolute Monocytes 0.8      Absolute Eosinophils 0.4      Absolute Basophils 0.1      Absolute Immature Granulocytes 0.0      Absolute NRBCs 0.0     INFLUENZA A/B & SARS-COV2 PCR MULTIPLEX   LACTATE DEHYDROGENASE FLUID   PROTEIN FLUID   GLUCOSE FLUID   CELL COUNT BODY FLUID   NON-GYNECOLOGIC CYTOLOGY   CELL COUNT WITH DIFFERENTIAL FLUID        Procedures:      Narrative: Procedure: Thoracentesis       INDICATION: pleural effusion      LOCATION: Right pleural space      CONSENT: The patient was verbally consented regarding the risks, benefits of the procedure.      ANESTHESIA: Lidocaine with epinephrine 4 cc injected locally into the tissue        TECHNIQUE: Ultrasound was used to help guide needle placement.  Right posterior back space was identified with ultrasound.  Area was numbed intradermally and using the thoracentesis kit catheter was inserted.  Approximately 1700cc pleural fluid obtained.  Serosanguineous in color the patient tolerated the procedure well without difficulty.  There were no complications.    Interventions:    Medications   lidocaine 1% with EPINEPHrine 1:100,000 injection 1 mL (has no administration in time range)   lidocaine 1% with EPINEPHrine 1:100,000 1 %-1:167747 injection (has no administration in time range)   0.9% sodium chloride BOLUS (0 mLs Intravenous Stopped 1/19/22 2149)   ketorolac (TORADOL) injection 30 mg (30 mg Intravenous Given 1/19/22 2003)   ipratropium - albuterol 0.5 mg/2.5 mg/3 mL (DUONEB) neb solution 3 mL (3 mLs Nebulization Given 1/19/22 2002)   CT Scan Flush (90 mLs Intravenous Given 1/19/22 2120)   iopamidol (ISOVUE-370) solution 500 mL (73 mLs Intravenous Given 1/19/22 2120)        Emergency Department Course:  Past medical records, nursing notes, and vitals reviewed.  I performed an exam of the patient and obtained history, as documented above.    I rechecked the patient. Findings and plan explained to the  Patient.     Impression & Plan       Medical Decision Making:  Patient presents ED with shortness of breath for about 6 days.  He had already tested negative for COVID-19 once.  He was slightly tachycardic and hypoxic requiring a couple liters nasal cannula.  Sent patient back for CT scan due to his tachycardia and shortness of breath concerned about PE or COVID.  CT showed very large right pleural effusion.  Even some evidence of tension on the mediastinum.  Given his findings and the hypoxemia I performed a emergent thoracentesis and drained approximately 1700 ml serosanguineous drainage.  Patient tolerated the procedure well he already feels less short of breath.  We will send some fluid analysis studies.  We will asked the hospitalist to observe overnight to make sure he does not get reexpansion pneumonitis and help follow-up with fluid analysis.      Diagnosis:    ICD-10-CM    1. Pleural effusion on right  J90         Discharge Medications:  New Prescriptions    No medications on file        1/19/2022   Anmol Shay,*        Anmol Shay MD  01/19/22 2213       Anmol Shay MD  02/11/22 1015

## 2022-01-20 NOTE — PHARMACY-ADMISSION MEDICATION HISTORY
Admission medication history interview status for this patient is complete. See [facility] admission navigator for allergy information, prior to admission medications and immunization status.     Medication history interview done, indicate source(s): Patient  Medication history resources (including written lists, pill bottles, clinic record):None  Pharmacy: none    Changes made to PTA medication list:  Added: none  Changed: none  Reported as Not Taking: Lantus  Removed: none    Actions taken by pharmacist (provider contacted, etc):None     Additional medication history information: NOT TAKING ANY meds, was on Lantus at some point, but not recently. RX listed on med list is old, from 5/12/20.    Medication reconciliation/reorder completed by provider prior to medication history?  yes   (Y/N)     For patients on insulin therapy: no    Prior to Admission medications    Medication Sig Last Dose Taking? Auth Provider   insulin glargine (LANTUS PEN) 100 UNIT/ML pen Inject 15 Units Subcutaneous At Bedtime  Patient not taking: Reported on 1/20/2022 Not Taking at Unknown time - OLD Rx from 5.12.20  NO Jayden King PA-C

## 2022-01-20 NOTE — PROGRESS NOTES
Thoracentesis completed per Dr. Adamson for 1000 ml clear red fluid, patient tolerated poorly with diaphoresis, oxygen  Saturation dropped to 85%, increased coughing, placed on 2 L nasal canula. Patient recovered and transferred to room via cart, report given to Mary TEAGUE at bedside.

## 2022-01-20 NOTE — PLAN OF CARE
Problem: Gas Exchange Impaired  Goal: Optimal Gas Exchange  Intervention: Optimize Oxygenation and Ventilation  Recent Flowsheet Documentation  Taken 1/20/2022 1636 by Jhoan Celaya RN  Head of Bed (HOB) Positioning: HOB at 20-30 degrees     Problem: Activity Intolerance (Pulmonary Impairment)  Goal: Improved Activity Tolerance  Outcome: Improving     Problem: Gas Exchange Impaired  Goal: Optimal Gas Exchange  Outcome: Improving    Pt is alert and oriented x 4. He denied pain this shift. Pt is independent in room. He remains on RA, diminished lungs sound an sats is in the 90s. He denied SOB. Pt is diabetic,ACHS accu checks. Pt is on Tele- Sinus Tachy. Will continue to monitor

## 2022-01-20 NOTE — H&P
[facility]    History and Physical - Hospitalist Service       Date of Admission:  1/19/2022    Assessment & Plan      Connor Emerson is a 41 year old male admitted on 1/19/2022. He has a past medical history significant for diabetes mellitus type 2.  He presented to emergency room due to 1 week of shortness of breath.  Found to have very large right pleural effusion.    1.  Very large right pleural effusion.  Unknown etiology.  Concerning left lung nodules and subcarinal lymphadenopathy noted on CT scan.  Status post initial thoracentesis at bedside by ER provider.  -Ultrasound-guided thoracentesis tomorrow for further drainage.  -Check SARS-CoV-2 and influenza PCR's.  -Start antibiotics with azithromycin and IV ceftriaxone.  -Plan to check echocardiogram once effusion well drained.  -Also needs further investigation of above-noted pulmonary nodules and lymphadenopathy.    2.  Diabetes mellitus type 2.  Will be n.p.o. at midnight.  -Check hemoglobin A1c.  -Start aspart insulin sliding scale.    3.  Sinus tachycardia.  Suspect related to large right pleural effusion.  -Monitor on telemetry.  -Check TSH.  -Plan to check echocardiogram.       Diet: NPO for Medical/Clinical Reasons Except for: No Exceptions  Combination Diet Regular Diet Adult    DVT Prophylaxis: Pneumatic Compression Devices  Heart Catheter: Not present  Central Lines: None  Cardiac Monitoring: ACTIVE order. Indication: Tachyarrhythmias, acute (48 hours)  Code Status: Full Code      Clinically Significant Risk Factors Present on Admission                      Nathaniel Monterroso DO  Hospitalist Service  [facility]  Securely message with the Vocera Web Console (learn more here)  Text page via [email or web address] Paging/Directory         ______________________________________________________________________    Chief Complaint   Shortness of breath.    History is obtained from the patient    History of Present Illness    Connor Emerson is a 41 year old male who has a past medical history significant for diabetes mellitus type 2.  He normally takes metformin for his diabetes.  He has not been taking his metformin as regularly as he should recently.  He began feeling short of breath 7 days ago.  Shortness of breath has gotten significantly worse over the past week.  He has had a cough with this.  He has not noticed fevers or chills.  Has not had chest pain.  Does not member having shortness of breath like this previously.  Nothing at home was helping with his shortness of breath.  He did not notice anything in particular that made shortness of breath worse.  He presented to emergency room for further evaluation.  He has not had COVID-19 vaccine or influenza vaccine this year.  No other acute complaints.  Feeling significantly better after bedside thoracentesis performed in emergency room.    Review of Systems    The 10 point Review of Systems is negative other than noted in the HPI     Past Medical History    I have reviewed this patient's medical history and updated it with pertinent information if needed.   Past Medical History:   Diagnosis Date     Atypical chest pain 12/2/2013     Diabetes (H)      GERD (gastroesophageal reflux disease) 12/2/2013     HTN (hypertension) 5/14/2012     HTN, goal below 140/90 7/2/2013     Insomnia 2/21/2012     Migraine headache 7/2/2013     Migraine with aura, without mention of intractable migraine without mention of status migrainosus        Past Surgical History   I have reviewed this patient's surgical history and updated it with pertinent information if needed.  Past Surgical History:   Procedure Laterality Date     INJECT BLOCK MEDIAL BRANCH CERVICAL/THORACIC/LUMBAR       ORTHOPEDIC SURGERY      Ganesh. Rotator cuff repair.       Social History   I have reviewed this patient's social history and updated it with pertinent information if needed.  Social History     Tobacco Use     Smoking status:  Never Smoker     Smokeless tobacco: Never Used   Substance Use Topics     Alcohol use: Yes     Alcohol/week: 0.0 standard drinks     Comment: social     Drug use: No       Family History   I have reviewed this patient's family history and updated it with pertinent information if needed.  Family History   Problem Relation Age of Onset     Unknown/Adopted Mother      Unknown/Adopted Father      Unknown/Adopted Paternal Grandmother      Unknown/Adopted Paternal Grandfather      Unknown/Adopted Maternal Grandfather      Unknown/Adopted Maternal Grandmother        Prior to Admission Medications   Prior to Admission Medications   Prescriptions Last Dose Informant Patient Reported? Taking?   blood glucose (NO BRAND SPECIFIED) lancets standard Unknown at Unknown time  No Yes   Sig: Use to test blood sugar 1 times daily or as directed.   blood glucose (NO BRAND SPECIFIED) test strip Unknown at Unknown time  No Yes   Sig: Use to test blood sugar 1 times daily or as directed.   blood glucose monitoring (NO BRAND SPECIFIED) meter device kit Unknown at Unknown time  No Yes   Sig: Use to test blood sugar 1 times daily or as directed.   insulin glargine (LANTUS PEN) 100 UNIT/ML pen Unknown at Unknown time  No Yes   Sig: Inject 15 Units Subcutaneous At Bedtime   insulin pen needle (31G X 8 MM) 31G X 8 MM miscellaneous Unknown at Unknown time  No Yes   Sig: Use one pen needles daily or as directed.      Facility-Administered Medications: None     Allergies   Allergies   Allergen Reactions     Vicodin [Hydrocodone-Acetaminophen] Nausea and Vomiting and GI Disturbance       Physical Exam   Vital Signs: Temp: 100.2  F (37.9  C) Temp src: Oral BP: (!) 189/133 Pulse: (!) 121   Resp: 20 SpO2: 95 % O2 Device: Nasal cannula Oxygen Delivery: 2 LPM  Weight: 0 lbs 0 oz    Gen:  NAD, A&Ox3.  Eyes:  PERRL, sclera anicteric.  OP:  MMM, no lesions.  Neck:  Supple.  CV:  Regular, tachycardic, no loud murmurs.  Lung: Very diminished breath sounds on  right, CTA on left, normal effort.  Ab:  +BS, soft.  Skin:  Warm, dry to touch.  No rash.  Ext:  No pitting edema LE b/l.      Data   Data reviewed today: I reviewed all medications, new labs and imaging results over the last 24 hours. I personally reviewed the EKG tracing showing Sinus tachycardia.    Recent Labs   Lab 01/19/22 2006   WBC 10.1   HGB 15.9   MCV 92         POTASSIUM 4.0   CHLORIDE 104   CO2 30   BUN 14   CR 0.56*   ANIONGAP 3   TORY 9.1   *   ALBUMIN 3.0*   PROTTOTAL 7.2   BILITOTAL 0.5   ALKPHOS 87   ALT 24   AST 12

## 2022-01-20 NOTE — ED TRIAGE NOTES
Pt presents to ER by private vehicle with spouse.  Pt has had increasing shortness of breath.  Neb done at home, did not help.  Upon arrival tachypnic and room air sat of 86%, heart rate in the 120's.  Pt unable to carry on conversation and reports some pain in his abdomen for the last 5 days.  Denies N/V/D.  A/O x4.

## 2022-01-20 NOTE — PLAN OF CARE
Pt arrived on the unit at 00:30. Ambulates IND. VSS. On remote tele. IV SL. NPO since midnight. Lungs diminished. Denies pain.   Plan: US guided thoracentesis today.

## 2022-01-20 NOTE — PLAN OF CARE
"/78   Pulse 87   Temp 97  F (36.1  C) (Temporal)   Resp 18   Ht 1.753 m (5' 9\")   Wt 103.8 kg (228 lb 14.4 oz)   SpO2 95%   BMI 33.80 kg/m    Neuro: a/o x4  Cardiac: tele- sinus tach  Lungs: WNL  GI: WNL  : WNL  Pain: Denies  IV: Saline locked  Meds: azithromycin, ceftriaxone   Diet: Mod consistent carb  Activity: independent   Misc: R pleural effusion- 1.7L drained. Ultrasound guided thoracentesis- 1L removed. BG q4  Plan: Will continue to monitor and provide cares.         "

## 2022-01-20 NOTE — PROGRESS NOTES
[facility]    Medicine Progress Note - Hospitalist Service    Date of Admission:  1/19/2022    Assessment & Plan          Connor Emerson is a 41 year old male admitted on 1/19/2022. He has a past medical history significant for diabetes mellitus type 2.  He presented to emergency room due to 1 week of shortness of breath.  Found to have very large right pleural effusion.     1.  Very large right pleural effusion.  Unknown etiology.  Concerning left lung nodules and subcarinal lymphadenopathy noted on CT scan.  Status post initial thoracentesis at bedside by ER provider.  -Ultrasound-guided thoracentesis done by interventional radiology this morning for further drainage.  -SARS-CoV-2 and influenza PCR's negative.  -Continue antibiotics with azithromycin and IV ceftriaxone.  -Check echocardiogram.  -Pulmonology consult.     2.  Diabetes mellitus type 2.  Will be n.p.o. at midnight.  -Hemoglobin A1c elevated at 10.  -Now start diabetic diet.   -Start glargine insulin 15 units a day.  -continue aspart insulin sliding scale.     3.  Sinus tachycardia.  Suspect related to large right pleural effusion.  -Monitor on telemetry.  -Much improved with effusion drainage.  -Check echocardiogram.    4.  Pulmonary nodules.  -Pulmonology consult.       Diet: Moderate Consistent Carb (60 g CHO per Meal) Diet    DVT Prophylaxis: Pneumatic Compression Devices  Heart Catheter: Not present  Central Lines: None  Cardiac Monitoring: ACTIVE order. Indication: Tachyarrhythmias, acute (48 hours)  Code Status: Full Code          Nathaniel Monterroso DO  Hospitalist Service  [facility]  Securely message with the Vocera Web Console (learn more here)  Text page via [employer] Paging/Directory         Clinically Significant Risk Factors Present on Admission                # Obesity: last Body mass index is 33.8 kg/m .      ______________________________________________________________________    Interval  History   Short of breath. Coughing. Much improved from previous now that he has had fluid drainage. Denies chest pain, fevers, chills, nausea, vomiting.    Data reviewed today: I reviewed all medications, new labs and imaging results over the last 24 hours.     Physical Exam   Vital Signs: Temp: 97  F (36.1  C) Temp src: Temporal BP: 117/78 Pulse: 87   Resp: 18 SpO2: 95 % O2 Device: Nasal cannula Oxygen Delivery: 2 LPM  Weight: 228 lbs 14.4 oz  Gen:  NAD, A&Ox3.  Eyes:  PERRL, sclera anicteric.  OP:  MMM, no lesions.  Neck:  Supple.  CV:  Regular, no murmurs.  Lung: Diminished breath sounds on right, clear to auscultation on left, normal effort.  Ab:  +BS, soft.  Skin:  Warm, dry to touch.  No rash.  Ext:  No pitting edema LE b/l.      Data   Recent Labs   Lab 01/20/22  0609 01/20/22  0437 01/20/22  0041 01/19/22 2006   WBC 7.8  --   --  10.1   HGB 14.0  --   --  15.9   MCV 93  --   --  92     --   --  381     --   --  137   POTASSIUM 3.8  --   --  4.0   CHLORIDE 109  --   --  104   CO2 29  --   --  30   BUN 12  --   --  14   CR 0.52*  --   --  0.56*   ANIONGAP 3  --   --  3   TORY 8.2*  --   --  9.1   * 248* 335* 399*   ALBUMIN 2.2*  --   --  3.0*   PROTTOTAL 5.7*  --   --  7.0  7.2   BILITOTAL 0.3  --   --  0.5   ALKPHOS 69  --   --  87   ALT 18  --   --  24   AST 8  --   --  12

## 2022-01-21 ENCOUNTER — HOSPITAL ENCOUNTER (OUTPATIENT)
Facility: CLINIC | Age: 42
End: 2022-01-21
Attending: INTERNAL MEDICINE | Admitting: INTERNAL MEDICINE

## 2022-01-21 ENCOUNTER — TELEPHONE (OUTPATIENT)
Dept: PULMONOLOGY | Facility: CLINIC | Age: 42
End: 2022-01-21

## 2022-01-21 ENCOUNTER — APPOINTMENT (OUTPATIENT)
Dept: GENERAL RADIOLOGY | Facility: CLINIC | Age: 42
DRG: 186 | End: 2022-01-21
Attending: INTERNAL MEDICINE

## 2022-01-21 ENCOUNTER — APPOINTMENT (OUTPATIENT)
Dept: ULTRASOUND IMAGING | Facility: CLINIC | Age: 42
DRG: 186 | End: 2022-01-21
Attending: INTERNAL MEDICINE

## 2022-01-21 DIAGNOSIS — Z11.59 ENCOUNTER FOR SCREENING FOR OTHER VIRAL DISEASES: Primary | ICD-10-CM

## 2022-01-21 DIAGNOSIS — R59.0 MEDIASTINAL LYMPHADENOPATHY: Primary | ICD-10-CM

## 2022-01-21 LAB
ANION GAP SERPL CALCULATED.3IONS-SCNC: 4 MMOL/L (ref 3–14)
APPEARANCE FLD: ABNORMAL
BASOPHILS NFR FLD MANUAL: 1 %
BUN SERPL-MCNC: 12 MG/DL (ref 7–30)
CALCIUM SERPL-MCNC: 8.6 MG/DL (ref 8.5–10.1)
CHLORIDE BLD-SCNC: 108 MMOL/L (ref 94–109)
CO2 SERPL-SCNC: 28 MMOL/L (ref 20–32)
COLOR FLD: ABNORMAL
CREAT SERPL-MCNC: 0.52 MG/DL (ref 0.66–1.25)
EOSINOPHIL NFR FLD MANUAL: 2 %
GFR SERPL CREATININE-BSD FRML MDRD: >90 ML/MIN/1.73M2
GLUCOSE BLD-MCNC: 236 MG/DL (ref 70–99)
GLUCOSE BLDC GLUCOMTR-MCNC: 201 MG/DL (ref 70–99)
GLUCOSE BLDC GLUCOMTR-MCNC: 221 MG/DL (ref 70–99)
GLUCOSE BLDC GLUCOMTR-MCNC: 234 MG/DL (ref 70–99)
GLUCOSE BLDC GLUCOMTR-MCNC: 243 MG/DL (ref 70–99)
GLUCOSE BLDC GLUCOMTR-MCNC: 258 MG/DL (ref 70–99)
GLUCOSE BLDC GLUCOMTR-MCNC: 265 MG/DL (ref 70–99)
GLUCOSE FLD-MCNC: 229 MG/DL
LDH FLD L TO P-CCNC: 288 U/L
LYMPHOCYTES NFR FLD MANUAL: 52 %
MONOS+MACROS NFR FLD MANUAL: NORMAL %
NEUTS BAND NFR FLD MANUAL: 10 %
OTHER CELLS FLD MANUAL: 37 %
POTASSIUM BLD-SCNC: 3.7 MMOL/L (ref 3.4–5.3)
PROT FLD-MCNC: 3.8 G/DL
SODIUM SERPL-SCNC: 140 MMOL/L (ref 133–144)
WBC # FLD AUTO: 1591 /UL

## 2022-01-21 PROCEDURE — 272N000706 US THORACENTESIS

## 2022-01-21 PROCEDURE — 250N000009 HC RX 250: Performed by: RADIOLOGY

## 2022-01-21 PROCEDURE — 88342 IMHCHEM/IMCYTCHM 1ST ANTB: CPT | Mod: 26 | Performed by: PATHOLOGY

## 2022-01-21 PROCEDURE — 250N000011 HC RX IP 250 OP 636: Performed by: INTERNAL MEDICINE

## 2022-01-21 PROCEDURE — 88112 CYTOPATH CELL ENHANCE TECH: CPT | Mod: 26 | Performed by: PATHOLOGY

## 2022-01-21 PROCEDURE — 88341 IMHCHEM/IMCYTCHM EA ADD ANTB: CPT | Mod: 26 | Performed by: PATHOLOGY

## 2022-01-21 PROCEDURE — 120N000001 HC R&B MED SURG/OB

## 2022-01-21 PROCEDURE — 82310 ASSAY OF CALCIUM: CPT | Performed by: INTERNAL MEDICINE

## 2022-01-21 PROCEDURE — 99232 SBSQ HOSP IP/OBS MODERATE 35: CPT | Performed by: INTERNAL MEDICINE

## 2022-01-21 PROCEDURE — 250N000012 HC RX MED GY IP 250 OP 636 PS 637: Performed by: INTERNAL MEDICINE

## 2022-01-21 PROCEDURE — 71046 X-RAY EXAM CHEST 2 VIEWS: CPT

## 2022-01-21 PROCEDURE — 88305 TISSUE EXAM BY PATHOLOGIST: CPT | Mod: TC | Performed by: INTERNAL MEDICINE

## 2022-01-21 PROCEDURE — 258N000003 HC RX IP 258 OP 636: Performed by: INTERNAL MEDICINE

## 2022-01-21 PROCEDURE — 88305 TISSUE EXAM BY PATHOLOGIST: CPT | Mod: 26 | Performed by: PATHOLOGY

## 2022-01-21 PROCEDURE — 99223 1ST HOSP IP/OBS HIGH 75: CPT | Performed by: INTERNAL MEDICINE

## 2022-01-21 PROCEDURE — 36415 COLL VENOUS BLD VENIPUNCTURE: CPT | Performed by: INTERNAL MEDICINE

## 2022-01-21 RX ADMIN — CEFTRIAXONE 2 G: 2 INJECTION, POWDER, FOR SOLUTION INTRAMUSCULAR; INTRAVENOUS at 23:43

## 2022-01-21 RX ADMIN — INSULIN GLARGINE 15 UNITS: 100 INJECTION, SOLUTION SUBCUTANEOUS at 09:25

## 2022-01-21 RX ADMIN — LIDOCAINE HYDROCHLORIDE 10 ML: 10 INJECTION, SOLUTION EPIDURAL; INFILTRATION; INTRACAUDAL; PERINEURAL at 14:42

## 2022-01-21 RX ADMIN — CEFTRIAXONE 2 G: 2 INJECTION, POWDER, FOR SOLUTION INTRAMUSCULAR; INTRAVENOUS at 00:29

## 2022-01-21 RX ADMIN — AZITHROMYCIN MONOHYDRATE 250 MG: 500 INJECTION, POWDER, LYOPHILIZED, FOR SOLUTION INTRAVENOUS at 22:02

## 2022-01-21 ASSESSMENT — ACTIVITIES OF DAILY LIVING (ADL)
ADLS_ACUITY_SCORE: 3

## 2022-01-21 ASSESSMENT — MIFFLIN-ST. JEOR: SCORE: 1915.95

## 2022-01-21 NOTE — TELEPHONE ENCOUNTER
Received message from Interventional Pulmonology Amie-op Coordinator Carrie requesting that patient be scheduled for an Endoscopy Procedure with Dr. Dallin Agrawal - the Case Request is in.    Patient is currently inpatient at [facility] and will be tested for COVID-19 today.    Called patient, left him a voicemail message to call back regarding scheduling for this coming Tuesday 1/25/2022 if he has questions.    Spoke with Eryn in OR Scheduling, scheduled the procedure as follows:    Patient is scheduled for surgery with Dr. Dr. Dallin Agrawal    Spoke with: Amie-op Coordinator Carrie    Date of Surgery: 1/25/2022    Location: Endoscopy OR at [place]    Informed patient they will need an adult  Yes    Pre op with Provider Dr. Christine while inpatient    H&P: not required for this procedure (he would have one from being inpatient)    Pre-procedure COVID-19 Test: to be completed while inpatient at [facility] on 1/21/2022    Surgery packet: sent via [employer]     Additional comments: I am covering for Carrie this afternoon

## 2022-01-21 NOTE — CONSULTS
[facility]  Inpatient Pulmonary Consult Note (Initial)    January 21, 2022    Chief complaint:  Connor Emerson is a 41 year old male seen for   Chief Complaint   Patient presents with     Shortness of Breath     Referring physician: Nathaniel Monterroso    Reason for clinic visit / Chief complaint:   Pleural effusion    Assessment and Plan:  Massive right-sided pleural effusion with midline shift, mediastinal lymph node enlargement, left lung pulmonary nodule.  Patient now has been tapped x2 by interventional radiology, first time 1.7 L on January 20 and 2 L today.  Cytology has been pending.  Fluid is exudative.  Please obtain CT chest today to determine any abnormality in the lung parenchyma and or airways in the right lung.  In the meantime, I would recommend bronchoscopy with biopsies of mediastinal lymph nodes as early as possible which I explained to patient and I discussed with Dr. Monterroso and scheduled patient for this procedure on January 25, 2022.  Main concern at this point is malignancy or an occlusive endobronchial lesion causing complete right lung opacification and pleural effusion.     History of diabetes.      History of Present Illness:  This is a 41 years old gentleman with no known pulmonary problems admitted with increasing shortness of breath.  He has progressive shortness of breath for the last 2 weeks.  He did not recall having any other respiratory symptoms such as fever, chills, chest pain, productive cough recently.  He is tested negative for COVID and influenza on this admission.  He has been covered with ceftriaxone and azithromycin for presumed community-acquired pneumonia currently.  His fluid LDH was 213 (serum 117), fluid protein 4.3 (serum 7), 1755 WBC, 9% eosinophils, 36% lymphocyte, 34% neutrophils.  Patient's shortness of breath has improved but not back to normal after he was tapped yesterday.  He is a non-smoker.  No history of exposure to chemicals or radiation.       Allergies    Allergen Reactions     Vicodin [Hydrocodone-Acetaminophen] Nausea and Vomiting and GI Disturbance        Past Medical History:   Diagnosis Date     Atypical chest pain 12/2/2013     Diabetes (H)      GERD (gastroesophageal reflux disease) 12/2/2013     HTN (hypertension) 5/14/2012     HTN, goal below 140/90 7/2/2013     Insomnia 2/21/2012     Migraine headache 7/2/2013     Migraine with aura, without mention of intractable migraine without mention of status migrainosus         Past Surgical History:   Procedure Laterality Date     INJECT BLOCK MEDIAL BRANCH CERVICAL/THORACIC/LUMBAR       ORTHOPEDIC SURGERY      Ganesh. Rotator cuff repair.        Social History     Socioeconomic History     Marital status:      Spouse name: Not on file     Number of children: Not on file     Years of education: Not on file     Highest education level: Not on file   Occupational History     Not on file   Tobacco Use     Smoking status: Never Smoker     Smokeless tobacco: Never Used   Substance and Sexual Activity     Alcohol use: Yes     Alcohol/week: 0.0 standard drinks     Comment: social     Drug use: No     Sexual activity: Yes     Partners: Female   Other Topics Concern     Parent/sibling w/ CABG, MI or angioplasty before 65F 55M? No   Social History Narrative     Not on file     Social Determinants of Health     Financial Resource Strain: Not on file   Food Insecurity: Not on file   Transportation Needs: Not on file   Physical Activity: Not on file   Stress: Not on file   Social Connections: Not on file   Intimate Partner Violence: Not on file   Housing Stability: Not on file        Family History   Problem Relation Age of Onset     Unknown/Adopted Mother      Unknown/Adopted Father      Unknown/Adopted Paternal Grandmother      Unknown/Adopted Paternal Grandfather      Unknown/Adopted Maternal Grandfather      Unknown/Adopted Maternal Grandmother         Immunization History   Administered Date(s) Administered      HepA-Adult 04/10/2014     HepB-Adult 04/10/2014     Influenza (IIV3) PF 10/06/2009, 11/03/2010     Influenza Vaccine IM > 6 months Valent IIV4 (Alfuria,Fluzone) 10/06/2015     Pneumococcal 23 valent 10/06/2015     TDAP Vaccine (Adacel) 12/01/2011     Td (Adult), Adsorbed 05/31/2001, 12/27/2004       Current Facility-Administered Medications   Medication     acetaminophen (TYLENOL) tablet 650 mg    Or     acetaminophen (TYLENOL) Suppository 650 mg     azithromycin (ZITHROMAX) 250 mg in sodium chloride 0.9 % 250 mL intermittent infusion     cefTRIAXone (ROCEPHIN) 2 g vial to attach to  ml bag for ADULTS or NS 50 ml bag for PEDS     glucose gel 15-30 g    Or     dextrose 50 % injection 25-50 mL    Or     glucagon injection 1 mg     guaiFENesin (ROBITUSSIN) 20 mg/mL solution 10 mL     ibuprofen (ADVIL/MOTRIN) tablet 600 mg     insulin aspart (NovoLOG) injection (RAPID ACTING)     insulin aspart (NovoLOG) injection (RAPID ACTING)     insulin aspart (NovoLOG) injection (RAPID ACTING)     [START ON 1/22/2022] insulin glargine (LANTUS PEN) injection 20 Units     lidocaine (LMX4) cream     lidocaine (LMX4) cream     lidocaine 1 % 0.1-1 mL     lidocaine 1 % 0.1-1 mL     lidocaine 1% with EPINEPHrine 1:100,000 injection 1 mL     melatonin tablet 1 mg     nitroGLYcerin (NITROSTAT) sublingual tablet 0.4 mg     ondansetron (ZOFRAN-ODT) ODT tab 4 mg    Or     ondansetron (ZOFRAN) injection 4 mg     prochlorperazine (COMPAZINE) injection 10 mg    Or     prochlorperazine (COMPAZINE) tablet 10 mg    Or     prochlorperazine (COMPAZINE) suppository 25 mg     sodium chloride (PF) 0.9% PF flush 3 mL     sodium chloride (PF) 0.9% PF flush 3 mL     sodium chloride (PF) 0.9% PF flush 3 mL        Review of Systems:  I have done 10 points of review systems and all negative except for those mentioned in HPI    Physical examination  Constitutional: Oriented, not in distress  BP (!) 170/96   Pulse 111   Temp 97.6  F (36.4  C) (Temporal)   " Resp 18   Ht 1.753 m (5' 9\")   Wt 103.1 kg (227 lb 3.2 oz)   SpO2 96%   BMI 33.55 kg/m    Eyes: No icterus, nystagmus, pupils isocoric  Head and neck: normal posture and movements  Respiratory: Normal tidal breathing, no shortness of breath, no audible wheezing or stridor.  Abdomen: soft, non tender, no organomegaly  Musculoskeletal: Normal muscle mass, no deformity on hands/fingers  Integumentary:  No rash on visible skin areas on video visit  Neurological: Alert, orientedx3, no motor deficits  Psychiatric:  Mood and affect are appropriate with insight into his/her medical condition    Data:  Lab Results   Component Value Date    WBC 7.8 01/20/2022    WBC 9.3 04/27/2017     Lab Results   Component Value Date    RBC 4.67 01/20/2022    RBC 4.88 04/27/2017     Lab Results   Component Value Date    HGB 14.0 01/20/2022    HGB 15.2 04/27/2017     Lab Results   Component Value Date    HCT 43.4 01/20/2022    HCT 43.8 04/27/2017     Lab Results   Component Value Date    MCV 93 01/20/2022    MCV 90 04/27/2017     Lab Results   Component Value Date    MCH 30.0 01/20/2022    MCH 31.1 04/27/2017     Lab Results   Component Value Date    MCHC 32.3 01/20/2022    MCHC 34.7 04/27/2017     Lab Results   Component Value Date    RDW 12.1 01/20/2022    RDW 12.9 04/27/2017     Lab Results   Component Value Date     01/20/2022     04/27/2017       Lab Results   Component Value Date     01/21/2022     05/12/2020      Lab Results   Component Value Date    POTASSIUM 3.7 01/21/2022    POTASSIUM 3.9 05/12/2020     Lab Results   Component Value Date    CHLORIDE 108 01/21/2022    CHLORIDE 98 05/12/2020     Lab Results   Component Value Date    TORY 8.6 01/21/2022    TORY 9.8 05/12/2020     Lab Results   Component Value Date    CO2 28 01/21/2022    CO2 28 05/12/2020     Lab Results   Component Value Date    BUN 12 01/21/2022    BUN 20 05/12/2020     Lab Results   Component Value Date    CR 0.52 01/21/2022    CR 0.66 " 05/12/2020     Lab Results   Component Value Date     01/21/2022     01/21/2022     05/12/2020         SAGAR Christine MD   yes

## 2022-01-21 NOTE — PROGRESS NOTES
Thoracentesis consent and procedure completed by Dr. Adamson.  Tolerated well.  No complications.  VSS.  2000 mLs drained from right lung. Clear red fluid in color.  Site WDL.  Bandaid applied.  Specimen collected and sent to lab.  Reviewed discharge instructions and patient transferred to room via cart.

## 2022-01-21 NOTE — PLAN OF CARE
Pt A/O x4, VSS on RA except elevated BP. LS diminished with inspiratory wheezes. Denies pain. CMS intact. IV zithromax and rocephin. Up independently in room. Remote tele, sinus tach. Voiding adequately. Tolerating mod carb diet. . Will continue to monitor.

## 2022-01-21 NOTE — PROGRESS NOTES
[facility]    Medicine Progress Note - Hospitalist Service    Date of Admission:  1/19/2022    Assessment & Plan          Connor Emerson is a 41 year old male admitted on 1/19/2022. He has a past medical history significant for diabetes mellitus type 2.  He presented to emergency room due to 1 week of shortness of breath.  Found to have very large right pleural effusion.     1.  Very large right pleural effusion.  Unknown etiology.  Concerning left lung nodules and subcarinal lymphadenopathy noted on CT scan.  Status post initial thoracentesis at bedside by ER provider.  -Ultrasound-guided thoracentesis done by interventional radiology on 1/20 for further drainage.  -SARS-CoV-2 and influenza PCR's negative.  -Continue antibiotics with azithromycin and IV ceftriaxone.  -Echocardiogram unremarkable.  -Pulmonology consult pending.     2.  Diabetes mellitus type 2.    -Hemoglobin A1c elevated at 10.  -Increase glargine insulin to 20 units a day.  -Add aspart insulin on a carb counting basis with meals.  -Continue aspart insulin sliding scale.     3.  Sinus tachycardia.  Suspect related to large right pleural effusion.  -Much improved with effusion drainage.  -Stop telemetry monitoring.  -Echocardiogram with EF 60 to 65% and no wall motion abnormalities.     4.  Pulmonary nodules.  -Pulmonology consult.          Diet: Moderate Consistent Carb (60 g CHO per Meal) Diet    DVT Prophylaxis: Pneumatic Compression Devices  Heart Catheter: Not present  Central Lines: None  Cardiac Monitoring: ACTIVE order. Indication: Tachyarrhythmias, acute (48 hours)  Code Status: Full Code        Nathaniel Monterroso DO  Hospitalist Service  [facility]  Securely message with the [employer]oc[employer] Web Console (learn more here)  Text page via [employer] Paging/Directory         Clinically Significant Risk Factors Present on Admission             # Obesity: last Body mass index is 33.55 kg/m .       ______________________________________________________________________    Interval History   Short of breath.  Coughing.  Both improved from previous.  Denies chest pain, fevers, chills, nausea, vomiting.    Data reviewed today: I reviewed all medications, new labs and imaging results over the last 24 hours.     Physical Exam   Vital Signs: Temp: 97.6  F (36.4  C) Temp src: Temporal BP: (!) 140/73 Pulse: 84   Resp: 18 SpO2: 91 % O2 Device: None (Room air)    Weight: 227 lbs 3.2 oz  Gen:  NAD, A&Ox3.  Eyes:  PERRL, sclera anicteric.  OP:  MMM, no lesions.  Neck:  Supple.  CV:  Regular, no murmurs.  Lung: Diminished breath sounds on right, clear on left, normal effort.  Ab:  +BS, soft.  Skin:  Warm, dry to touch.  No rash.  Ext:  No pitting edema LE b/l.      Data   Recent Labs   Lab 01/21/22  1219 01/21/22  0758 01/21/22  0622 01/20/22  1309 01/20/22  0609 01/20/22  0041 01/19/22 2006   WBC  --   --   --   --  7.8  --  10.1   HGB  --   --   --   --  14.0  --  15.9   MCV  --   --   --   --  93  --  92   PLT  --   --   --   --  278  --  381   NA  --   --  140  --  141  --  137   POTASSIUM  --   --  3.7  --  3.8  --  4.0   CHLORIDE  --   --  108  --  109  --  104   CO2  --   --  28  --  29  --  30   BUN  --   --  12  --  12  --  14   CR  --   --  0.52*  --  0.52*  --  0.56*   ANIONGAP  --   --  4  --  3  --  3   TORY  --   --  8.6  --  8.2*  --  9.1   * 243* 236*   < > 239*   < > 399*   ALBUMIN  --   --   --   --  2.2*  --  3.0*   PROTTOTAL  --   --   --   --  5.7*  --  7.0  7.2   BILITOTAL  --   --   --   --  0.3  --  0.5   ALKPHOS  --   --   --   --  69  --  87   ALT  --   --   --   --  18  --  24   AST  --   --   --   --  8  --  12    < > = values in this interval not displayed.

## 2022-01-22 ENCOUNTER — APPOINTMENT (OUTPATIENT)
Dept: GENERAL RADIOLOGY | Facility: CLINIC | Age: 42
DRG: 186 | End: 2022-01-22
Attending: INTERNAL MEDICINE

## 2022-01-22 VITALS
TEMPERATURE: 97.9 F | DIASTOLIC BLOOD PRESSURE: 88 MMHG | OXYGEN SATURATION: 92 % | RESPIRATION RATE: 16 BRPM | BODY MASS INDEX: 33.22 KG/M2 | HEIGHT: 69 IN | HEART RATE: 86 BPM | SYSTOLIC BLOOD PRESSURE: 133 MMHG | WEIGHT: 224.3 LBS

## 2022-01-22 LAB
GLUCOSE BLDC GLUCOMTR-MCNC: 180 MG/DL (ref 70–99)
GLUCOSE BLDC GLUCOMTR-MCNC: 244 MG/DL (ref 70–99)
SARS-COV-2 RNA RESP QL NAA+PROBE: NEGATIVE

## 2022-01-22 PROCEDURE — 99239 HOSP IP/OBS DSCHRG MGMT >30: CPT | Performed by: INTERNAL MEDICINE

## 2022-01-22 PROCEDURE — 71046 X-RAY EXAM CHEST 2 VIEWS: CPT

## 2022-01-22 PROCEDURE — 87635 SARS-COV-2 COVID-19 AMP PRB: CPT | Performed by: INTERNAL MEDICINE

## 2022-01-22 RX ORDER — AZITHROMYCIN 250 MG/1
250 TABLET, FILM COATED ORAL DAILY
Qty: 2 TABLET | Refills: 0 | Status: SHIPPED | OUTPATIENT
Start: 2022-01-22 | End: 2022-01-24

## 2022-01-22 ASSESSMENT — ACTIVITIES OF DAILY LIVING (ADL)
ADLS_ACUITY_SCORE: 3

## 2022-01-22 ASSESSMENT — MIFFLIN-ST. JEOR: SCORE: 1902.8

## 2022-01-22 NOTE — PROGRESS NOTES
Patient's After Visit Summary was reviewed with patient and/or spouse.   Patient verbalized understanding of After Visit Summary, recommended follow up and was given an opportunity to ask questions.   Discharge medications sent home with patient/family: YES   Discharged with spouse      Siobhan Blanc RN

## 2022-01-22 NOTE — PLAN OF CARE
Pt A&O, vitals monitored on RA.  Up IND in room.  On remote tele.  Pt underwent thoracentesis today.  Very painful and SOB when sitting up in bed, feeling much better laying down.  BG checks.  Insulin administered.  IV locked.  Will continue plan of care.

## 2022-01-22 NOTE — DISCHARGE SUMMARY
[facility]  Hospitalist Discharge Summary      Date of Admission:  1/19/2022  Date of Discharge:  1/22/2022  Discharging Provider: Nathaniel Monterroso DO  Discharge Service: Hospitalist Service    Discharge Diagnoses   1.  Very large right pleural effusion.  Possible underlying  2.  Community-acquired pneumonia.  3.  Pulmonary nodules.    4.  Diabetes mellitus type 2.  5.  Sinus tachycardia.    Follow-ups Needed After Discharge   Follow-up Appointments     Follow-up and recommended labs and tests       Follow up with primary care provider, Radha Shetty, within 7 days   for hospital follow- up.  The following labs/tests are recommended: Basic   metabolic panel within 7days.  Follow-up with pulmonology within 4 days.               Discharge Disposition   Discharged to home  Condition at discharge: Stable    Hospital Course   Connor Emerson is a 41 year old male admitted on 1/19/2022. He has a past medical history significant for diabetes mellitus type 2.  He presented to emergency room due to 1 week of shortness of breath.  Found to have very large right pleural effusion.  He did undergo additional thoracentesis by ER provider on 1/19.  Had additional thoracentesis done by interventional radiology on 1/20 and 1/21.  Started on antibiotics with ceftriaxone and azithromycin.  He was seen in consultation by pulmonology on 1/21.  Does need to follow-up with pulmonology within the next 4 days for planned bronchoscopy in the outpatient setting.  This is to further evaluate cause of pleural effusion.  He is currently on antibiotics as noted above.  These will be switched to oral antibiotics with azithromycin and oral Augmentin.  Cytology from his pleural fluid was sent to the lab and is still in the preliminary stages.  He has not been taking his diabetic medication prior to hospital presentation.  He has been on glargine and aspart insulin during hospital stay.  He does need to restart his  outpatient diabetic regimen on discharge.  Follow-up with his primary care provider within 1 week for reevaluation of diabetic management.    Consultations This Hospital Stay   PULMONARY IP CONSULT    Code Status   Full Code    Time Spent on this Encounter   I spent 35 minutes with Mr. Emerson and working on discharge on 1/22/22.      Nathaniel Monterroso [facility]  [address] [facility] [address]  Phone: [phone]  Fax: [phone]  ______________________________________________________________________    Physical Exam   Vital Signs: Temp: 97.9  F (36.6  C) Temp src: Temporal BP: 133/88 Pulse: 86   Resp: 16 SpO2: 92 % O2 Device: None (Room air) Oxygen Delivery: 1 LPM  Weight: 224 lbs 4.8 oz  Gen:  NAD, A&Ox3.  Eyes:  PERRL, sclera anicteric.  OP:  MMM, no lesions.  Neck:  Supple.  CV:  Regular, no murmurs.  Lung: Diminished breath sounds on right, clear on left, normal effort.  Ab:  +BS, soft.  Skin:  Warm, dry to touch.  No rash.  Ext:  No pitting edema LE b/l.         Primary Care Physician   Radha Shetty    Discharge Orders      Reason for your hospital stay    Large right pleural effusion.     Follow-up and recommended labs and tests     Follow up with primary care provider, Radha Shetty, within 7 days for hospital follow- up.  The following labs/tests are recommended: Basic metabolic panel within 7days.  Follow-up with pulmonology within 4 days.     Activity    Your activity upon discharge: activity as tolerated     Diet    Follow this diet upon discharge: Moderate Consistent Carb (60 g CHO per Meal) Diet         Discharge Medications   Current Discharge Medication List      START taking these medications    Details   amoxicillin-clavulanate (AUGMENTIN) 875-125 MG tablet Take 1 tablet by mouth 2 times daily for 4 days  Qty: 8 tablet, Refills: 0    Associated Diagnoses: Pleural effusion on right      azithromycin (ZITHROMAX) 250 MG tablet Take 1 tablet  (250 mg) by mouth daily for 2 days  Qty: 2 tablet, Refills: 0    Associated Diagnoses: Pleural effusion on right         CONTINUE these medications which have NOT CHANGED    Details   blood glucose (NO BRAND SPECIFIED) lancets standard Use to test blood sugar 1 times daily or as directed.  Qty: 100 each, Refills: 0    Associated Diagnoses: Type 2 diabetes mellitus with hyperglycemia, without long-term current use of insulin (H)      blood glucose (NO BRAND SPECIFIED) test strip Use to test blood sugar 1 times daily or as directed.  Qty: 1 Box, Refills: 0    Associated Diagnoses: Type 2 diabetes mellitus with hyperglycemia, without long-term current use of insulin (H)      blood glucose monitoring (NO BRAND SPECIFIED) meter device kit Use to test blood sugar 1 times daily or as directed.  Qty: 1 kit, Refills: 0    Associated Diagnoses: Type 2 diabetes mellitus with hyperglycemia, without long-term current use of insulin (H)      insulin pen needle (31G X 8 MM) 31G X 8 MM miscellaneous Use one pen needles daily or as directed.  Qty: 100 each, Refills: 0    Associated Diagnoses: Type 2 diabetes mellitus without complication (H)      insulin glargine (LANTUS PEN) 100 UNIT/ML pen Inject 15 Units Subcutaneous At Bedtime  Qty: 3 mL, Refills: 0    Comments: If Lantus is not covered by insurance, may substitute Basaglar at same dose and frequency.    Associated Diagnoses: Type 2 diabetes mellitus with hyperglycemia, without long-term current use of insulin (H)           Allergies   Allergies   Allergen Reactions     Vicodin [Hydrocodone-Acetaminophen] Nausea and Vomiting and GI Disturbance

## 2022-01-24 ENCOUNTER — PREP FOR PROCEDURE (OUTPATIENT)
Dept: PULMONOLOGY | Facility: CLINIC | Age: 42
End: 2022-01-24

## 2022-01-24 ENCOUNTER — TELEPHONE (OUTPATIENT)
Dept: PULMONOLOGY | Facility: CLINIC | Age: 42
End: 2022-01-24

## 2022-01-24 LAB
PATH REPORT.COMMENTS IMP SPEC: ABNORMAL
PATH REPORT.COMMENTS IMP SPEC: YES
PATH REPORT.FINAL DX SPEC: ABNORMAL
PATH REPORT.FINAL DX SPEC: ABNORMAL
PATH REPORT.GROSS SPEC: ABNORMAL
PATH REPORT.GROSS SPEC: ABNORMAL
PATH REPORT.MICROSCOPIC SPEC OTHER STN: ABNORMAL
PATH REPORT.MICROSCOPIC SPEC OTHER STN: ABNORMAL
PATH REPORT.RELEVANT HX SPEC: ABNORMAL
PATH REPORT.RELEVANT HX SPEC: ABNORMAL

## 2022-01-24 PROCEDURE — 88112 CYTOPATH CELL ENHANCE TECH: CPT | Mod: 26

## 2022-01-24 PROCEDURE — 88305 TISSUE EXAM BY PATHOLOGIST: CPT | Mod: 26

## 2022-01-24 PROCEDURE — 88342 IMHCHEM/IMCYTCHM 1ST ANTB: CPT | Mod: 26

## 2022-01-24 PROCEDURE — 88341 IMHCHEM/IMCYTCHM EA ADD ANTB: CPT | Mod: 26

## 2022-01-24 NOTE — OR NURSING
Update after review by Anesthesia:    RE: AA: Hx Anesth Compl/Pulm Status/Diabetes  Received: Today  Britt Tobar RN Gold, Barbara Susan, MD; ELSI [facility] Anesthesiology; Dallin Agrawal MD; Loly Han APRN CNS; Leo Figueroa MD  Thank you Dr. Salazar. Our [employer] manager Kourtney helped me to scheduled the patient in PAC tomorrow at 11am. I spoke with his wife, and he has been struggling since discharge 1/22, has no pain medications or neb treatments, just oral antibiotics. Wife notes she feels like the fluid is increasing again and that patient is having quite a bit of chest pain after the multiple pleuralcenteses.     Wife reports pt had severe coughing fits where he can't breathe, and states that he hasn't been taking care of his diabetes for a while now. Wife reports one coughing spell was so severe this morning that the patient nearly fainted. I advised that we would recommend bringing him to the hospital ER for these symptoms, but wife states he does not want to go. I reinforced if he is having difficulty breathing she should not hesitate to call 911 and bring him to the hospital.     I also recommended extra strength tylenol as well as ice and heat for pain. Wife states understanding, no further questions at this time, confirms plan for appt tomorrow at 11am.     Yoko Tobar RN   PreAdmission Screening   [facility]             Previous Messages       ----- Message -----   From: Renetta Salazar MD   Sent: 1/24/2022   3:41 PM CST   To: Leo Figueroa MD, *   Subject: RE: AA: Hx Anesth Compl/Pulm Status/Diabetes     At a minimum, I recommend he be seen in PAC tomorrow (1/25).  If sx worsen, he should be seen acutely.   BSG   --    Yoko Tobar RN  PreAdmission Screening  [facility]

## 2022-01-24 NOTE — TELEPHONE ENCOUNTER
Called patient and spouse;     Per MD, procedure was changed to the Main OR  Procedure was rescheduled to Wed 1/26     Left detail message with patient and spouse's phone #   COVID test from saturday is still with-in the time frame.     My Chart message was sent,   Asked for a call back to confirm changes.     Carrie Nelson  Amie-Op Coordinator  [phone]
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
## 2022-01-25 ENCOUNTER — OFFICE VISIT (OUTPATIENT)
Dept: SURGERY | Facility: CLINIC | Age: 42
End: 2022-01-25

## 2022-01-25 ENCOUNTER — ANESTHESIA EVENT (OUTPATIENT)
Dept: SURGERY | Facility: CLINIC | Age: 42
End: 2022-01-25

## 2022-01-25 ENCOUNTER — PREP FOR PROCEDURE (OUTPATIENT)
Dept: PULMONOLOGY | Facility: CLINIC | Age: 42
End: 2022-01-25

## 2022-01-25 ENCOUNTER — PRE VISIT (OUTPATIENT)
Dept: SURGERY | Facility: CLINIC | Age: 42
End: 2022-01-25

## 2022-01-25 VITALS
WEIGHT: 228.3 LBS | SYSTOLIC BLOOD PRESSURE: 146 MMHG | OXYGEN SATURATION: 93 % | TEMPERATURE: 98.4 F | HEART RATE: 102 BPM | RESPIRATION RATE: 28 BRPM | HEIGHT: 69 IN | BODY MASS INDEX: 33.82 KG/M2 | DIASTOLIC BLOOD PRESSURE: 100 MMHG

## 2022-01-25 DIAGNOSIS — R59.0 MEDIASTINAL LYMPHADENOPATHY: ICD-10-CM

## 2022-01-25 DIAGNOSIS — Z01.818 PREOP EXAMINATION: Primary | ICD-10-CM

## 2022-01-25 PROCEDURE — 99204 OFFICE O/P NEW MOD 45 MIN: CPT | Performed by: CLINICAL NURSE SPECIALIST

## 2022-01-25 RX ORDER — CEFAZOLIN SODIUM 2 G/50ML
2 SOLUTION INTRAVENOUS SEE ADMIN INSTRUCTIONS
Status: CANCELLED | OUTPATIENT
Start: 2022-01-25

## 2022-01-25 RX ORDER — CEFAZOLIN SODIUM 2 G/50ML
2 SOLUTION INTRAVENOUS
Status: CANCELLED | OUTPATIENT
Start: 2022-01-25

## 2022-01-25 ASSESSMENT — MIFFLIN-ST. JEOR: SCORE: 1920.94

## 2022-01-25 ASSESSMENT — PAIN SCALES - GENERAL: PAINLEVEL: NO PAIN (0)

## 2022-01-25 ASSESSMENT — LIFESTYLE VARIABLES: TOBACCO_USE: 0

## 2022-01-25 NOTE — PATIENT INSTRUCTIONS
Preparing for Your Surgery      Name:  Connor Emerson   MRN:  [id number]   :  1980   Today's Date:  2022       Arriving for surgery:  Surgery date:  22  Arrival time:  5:30    Restrictions due to COVID 19       Effective 22 [facility] is implementing the following visitor policy:     1 person may accompany the patient through the Pre-Op process.      Then that person will be asked to leave the building.        There will be no visitors in the Surgery Waiting Room.        Inpatients are allowed 1 consistent visitor for the duration of their stay.      Visitors must wear a mask.      Visitors must not be ill.      Visiting hours are 8 am to 8 pm.    [facility] parking is available for anyone with mobility limitations or disabilities.  ([place]  24 hours/ 7 days a week; [facility]  7 am- 3:30 pm, Mon- Fri)    Please come to:     [facility] [address]  [address] [place] [address]    -   Parking is available in the Patient Visitor Ramp on [facility].     -   When entering the hospital you will be asked COVID screening questions, you will then be directed to Registration.  Registration will direct you to the 3rd floor Surgery waiting room.     -   Please ask if you need an escort or a wheelchair to the Surgery Waiting Room.  Preop number- [phone]?     What can I eat or drink?  -  You may eat and drink normally for up to 8 hours before your surgery. (Until 22, 11:30PM)  -  You may have clear liquids until 2 hours before surgery. (Until 22, 5:3AM)    Examples of clear liquids:  Water  Clear broth  Juices (apple, white grape, white cranberry  and cider) without pulp  Noncarbonated, powder based beverages  (lemonade and Rizwan-Aid)  Sodas (Sprite, 7-Up, ginger ale and seltzer)  Coffee or tea (without milk or cream)  Gatorade    -  No Alcohol for at least 24 hours before surgery     Which medicines can I  take?    Hold Aspirin for 7 days before surgery.   Hold Multivitamins for 7 days before surgery.  Hold Supplements for 7 days before surgery.  Hold Ibuprofen (Advil, Motrin) for 1 day before surgery--unless otherwise directed by surgeon.  Hold Naproxen (Aleve) for 4 days before surgery.      -  PLEASE TAKE these medications the day of surgery:    Augmentin    How do I prepare myself?  - Please take 2 showers before surgery using Scrubcare or Hibiclens soap.    Use this soap only from the neck to your toes.     Leave the soap on your skin for one minute--then rinse thoroughly.      You may use your own shampoo and conditioner; no other hair products.   - Please remove all jewelry and body piercings.  - No lotions, deodorants or fragrance.  - Bring your ID and insurance card.    -If you have a Deep Brain Stimulator, Spinal Cord Stimulator or any neuro stimulator device---you must bring the remote control to the hospital     - All patients are required to have a Covid-19 test within 4 days of surgery/procedure.      -Patients will be contacted by the [facility] scheduling team within 1 week of surgery to make an appointment.      - Patients may call the Scheduling team at [phone] if they have not been scheduled within 4 days of  surgery.      ALL PATIENTS GOING HOME THE SAME DAY OF SURGERY ARE REQUIRED TO HAVE A RESPONSIBLE ADULT TO DRIVE AND BE IN ATTENDANCE WITH THEM FOR 24 HOURS FOLLOWING SURGERY.      Questions or Concerns:    - For any questions regarding the day of surgery or your hospital stay, please contact the Pre Admission Nursing Office at [phone].       - If you have health changes between today and your surgery please call your surgeon.       For questions after surgery please call your surgeons office.

## 2022-01-25 NOTE — H&P
Pre-Operative H & P     CC:  Preoperative exam to assess for increased cardiopulmonary risk while undergoing surgery and anesthesia.    Date of Encounter: 1/25/2022  Primary Care Physician:  Radha Shetty Ra     Reason for visit:   Encounter Diagnoses   Name Primary?     Preop examination Yes     Mediastinal lymphadenopathy        HPI  Connor Emerson is a 41 year old male who presents for pre-operative H & P in preparation for BRONCHOSCOPY, FIBEROPTIC, endobronchial ultrasound, transbronchial biopsies, Pleuroscopy with Pleural Biopsy with Dr. Agrawal on 1/26/22 at [facility].     History is obtained from the patient, wife and chart review    Patient who is nonsmoker and without history of respiratory illness presented, and was admitted on 1/19/22 with shortness of breath and was found to have a massive right-sided pleural effusion with midline shift, mediastinal lymph node enlargement, left lung pulmonary nodule. During the admission he had thoracentesis x2 by interventional radiology, first time 1.7 liters obtained and last 2 liters. Cytology pending. He was started on antibiotics. Pulmonary consulted while in the hospital and recommended bronchoscopy with biopsies of mediastinal lymph nodes due to concern for malignancy or an occlusive endobronchial lesion causing complete right lung opacification and pleural effusion. He was counseled for above procedure    His history is otherwise significant for obesity, GERD, DM II, migraine, and anxiety/depression.    Today patient denies fever, chest pain, irregular HR, or ankle edema. When he first arrived he was coughing, and had shortness of breath with conversation. Coughing resolved after he rested. He remained somewhat short of breath throughout, but reported that this morning he was breathing better than he had since his discharge.        Hx of abnormal bleeding or anti-platelet use: Denies        Prior to Admission  Medications  Current Outpatient Medications   Medication Sig Dispense Refill     amoxicillin-clavulanate (AUGMENTIN) 875-125 MG tablet Take 1 tablet by mouth 2 times daily for 4 days 8 tablet 0     blood glucose (NO BRAND SPECIFIED) lancets standard Use to test blood sugar 1 times daily or as directed. 100 each 0     blood glucose (NO BRAND SPECIFIED) test strip Use to test blood sugar 1 times daily or as directed. 1 Box 0     blood glucose monitoring (NO BRAND SPECIFIED) meter device kit Use to test blood sugar 1 times daily or as directed. 1 kit 0     insulin glargine (LANTUS PEN) 100 UNIT/ML pen Inject 15 Units Subcutaneous At Bedtime (Patient not taking: Reported on 1/20/2022) 3 mL 0     insulin pen needle (31G X 8 MM) 31G X 8 MM miscellaneous Use one pen needles daily or as directed. 100 each 0       Family History  Family History   Problem Relation Age of Onset     Unknown/Adopted Mother      Unknown/Adopted Father      Unknown/Adopted Paternal Grandmother      Unknown/Adopted Paternal Grandfather      Unknown/Adopted Maternal Grandfather      Unknown/Adopted Maternal Grandmother        Past Medical History:   Diagnosis Date     Atypical chest pain 12/02/2013     Complication of anesthesia      Diabetes (H)      GERD (gastroesophageal reflux disease) 12/02/2013     HTN (hypertension) 05/14/2012     HTN, goal below 140/90 07/02/2013     Insomnia 02/21/2012     Mediastinal lymphadenopathy      Migraine headache 07/02/2013     Migraine with aura, without mention of intractable migraine without mention of status migrainosus      Pneumonia       Past Surgical History:   Procedure Laterality Date     INJECT BLOCK MEDIAL BRANCH CERVICAL/THORACIC/LUMBAR       ORTHOPEDIC SURGERY      Ganesh. Rotator cuff repair.      Allergies   Allergen Reactions     Vicodin [Hydrocodone-Acetaminophen] Nausea and Vomiting and GI Disturbance      Social History     Tobacco Use     Smoking status: Never Smoker     Smokeless tobacco: Never  Used   Substance Use Topics     Alcohol use: Yes     Alcohol/week: 0.0 standard drinks     Comment: social      Wt Readings from Last 1 Encounters:   01/22/22 101.7 kg (224 lb 4.8 oz)        Anesthesia Evaluation     History of anesthetic complications   Patient reports difficulty with anesthesia in 2013 with left shoulder rotator cuff repair. Was reportedly given no information about issues, but able to go home. No anesthesia since..    ROS/MED HX  ENT/Pulmonary: Comment: Massive right-sided pleural effusion with midline shift, mediastinal lymph node enlargement, left lung pulmonary nodule. During the admission he had thoracentesis x2 by interventional radiology.    Today shortness of breath and cough    (+) JERROD risk factors, hypertension, observed stopped breathing,  (-) tobacco use   Neurologic:     (+) migraines,     Cardiovascular: Comment: No activity limitations prior to acute shortness of breath incident/admission.    (+) hypertension-range: 146/100/ ----Previous cardiac testing   Echo: Date: Results:    Stress Test: Date: Results:    ECG Reviewed: Date: 1/19/22 Results:  ST, read as junctional ST depression  Cath: Date: Results:   (-) taking anticoagulants/antiplatelets   METS/Exercise Tolerance: >4 METS Comment: Had been treated in the past for HYPERTENSION but no longer takes medication.   Hematologic:  - neg hematologic  ROS     Musculoskeletal: Comment: Bilateral shoulder surgeries      GI/Hepatic: Comment: Significant heartburn symptoms but is not taking medications    (+) GERD,     Renal/Genitourinary:  - neg Renal ROS     Endo: Comment: Patient was not taking insulin prior to admission, was given insulin in the hospital but has not taken since he returned home.     (+) type II DM, Last HgA1c: 10.1, date: 1/19/22, Using insulin, - not using insulin pump. Obesity,     Psychiatric/Substance Use:     (+) psychiatric history anxiety and depression     Infectious Disease:  - neg infectious disease ROS    "  Malignancy:  - neg malignancy ROS     Other:  - neg other ROS        LABS:  CBC:   Lab Results   Component Value Date    WBC 7.8 01/20/2022    WBC 10.1 01/19/2022    HGB 14.0 01/20/2022    HGB 15.9 01/19/2022    HCT 43.4 01/20/2022    HCT 49.4 01/19/2022     01/20/2022     01/19/2022     BMP:   Lab Results   Component Value Date     01/21/2022     01/20/2022    POTASSIUM 3.7 01/21/2022    POTASSIUM 3.8 01/20/2022    CHLORIDE 108 01/21/2022    CHLORIDE 109 01/20/2022    CO2 28 01/21/2022    CO2 29 01/20/2022    BUN 12 01/21/2022    BUN 12 01/20/2022    CR 0.52 (L) 01/21/2022    CR 0.52 (L) 01/20/2022     (H) 01/22/2022     (H) 01/22/2022     COAGS:   Lab Results   Component Value Date    INR 1.01 04/25/2015     POC:   Lab Results   Component Value Date     (H) 04/27/2017     HEPATIC:   Lab Results   Component Value Date    ALBUMIN 2.2 (L) 01/20/2022    PROTTOTAL 5.7 (L) 01/20/2022    ALT 18 01/20/2022    AST 8 01/20/2022    ALKPHOS 69 01/20/2022    BILITOTAL 0.3 01/20/2022     OTHER:   Lab Results   Component Value Date    A1C 10.1 (H) 01/19/2022    TORY 8.6 01/21/2022    LIPASE 98 01/11/2006    TSH 1.81 01/19/2022    CRP 9.3 (H) 04/27/2017    SED 7 06/01/2016     BP (!) 146/100 (BP Location: Right arm, Patient Position: Chair, Cuff Size: Adult Large)   Pulse 102   Temp 98.4  F (36.9  C) (Oral)   Resp 28   Ht 1.753 m (5' 9\")   Wt 103.6 kg (228 lb 4.8 oz)   SpO2 93%   BMI 33.71 kg/m        Physical Exam  Constitutional: Awake, alert, cooperative, and appears stated age. Accompanied by wife.  Eyes: Pupils equal, round and reactive to light, extra ocular muscles intact, sclera clear, conjunctiva normal.  HENT: Normocephalic, oral pharynx with moist mucus membranes, good dentition. No goiter appreciated. Thick neck.  Respiratory: Clear to auscultation bilaterally, no crackles or wheezing. Decreased breath sounds left base. Some coughing when he first arrived. Dyspnea " when speaking.   Cardiovascular: Regular rate and rhythm, normal S1 and S2, and no murmur noted.  Carotids +2, no bruits. No edema. Palpable pulses to radial  DP and PT arteries.   GI: Normal bowel sounds, soft, non-distended, non-tender, no masses palpated.  Lymph/Hematologic: No cervical lymphadenopathy and no supraclavicular lymphadenopathy.  Genitourinary: Deferred  Skin: Warm and dry.  No rashes at anticipated surgical site.   Musculoskeletal: Limited ROM of neck. There is no redness, warmth, or swelling of the joints. Gross motor strength is normal.    Neurologic: Awake, alert, oriented to name, place and time. Cranial nerves II-XII are grossly intact. Gait is normal.   Neuropsychiatric: Calm, cooperative. Normal affect.     PRIOR LABS/DIAGNOSTIC STUDIES:   All labs and imaging personally reviewed     EK22 Sinus tachycardia, read as junctional ST depression    Echocardiogram  22    Interpretation Summary    1. Normal left ventricular size and systolic function. LVEF 60-65%.    2. No regional wall motion abnormalities.    3. Normal right ventricular size and systolic function.    4. No significant valve disease.    5. The inferior vena cava is normal.    There is no comparison study available.    22 CT Chest                                                    IMPRESSION:    1.  No pulmonary embolism.         2.  Massive right pleural effusion with right to left mediastinal shift. Underlying compressive consolidation right lung. Mucus or debris right lower lobe bronchus.         3.  Several indeterminate nodules in the left lung suspicious for possible metastases.         4.  Subcarinal lymphadenopathy.      CXR 22  IMPRESSION: Moderate right effusion, markedly improved from previous. Left lung clear. No pneumothorax.    The patient's records and results personally reviewed by this provider.     Outside records reviewed from: care everywhere    ASSESSMENT and PLAN  Connor Emerson is a 41  year old male who presents for pre-operative H & P in preparation for BRONCHOSCOPY, FIBEROPTIC, endobronchial ultrasound, transbronchial biopsies, Pleuroscopy with Pleural Biopsy with Dr. Agrawal on 1/26/22 at [facility].   RCRI: 0.9% risk of serious cardiac event  Risk classification for surgery procedure: Low  VTE risk: 0.5-3%  JERROD: 5/8=High risk   PONV: 2.    --Massive right pleural effusion, mediastinal lymph node enlargement, and left lung pulmonary nodule. S/p thoracenteses X 2 while hospitalized. Above procedure planned for further evaluation. Persistent shortness of breath. 02 sat 93% today. Respirations 28-32.  --Reports history of anesthesia issue in 2013 at OSH while having shoulder surgery. Patient was not given any details. Was able to go home. No anesthesia since.   --HYPERTENSION. /100. Reported previous treatment with medication but stopped taking. No other cardiac history, symptoms or meds. Echocardiogram above essentially normal. Was very active prior to admission.   --High risk for JERROD. Has never been tested.   --GERD. Significant symptoms. Does not take medication.   --History of migraines.   --DIABETES MELLITUS II Last A1c 10.1. Has been prescribed insulin and was given doses while hospitalized but stopped after he got home. Does not monitor BS.  --History of anxiety/depression. No meds.       Patient was discussed with Dr Leon. Will proceed to urgent procedure.       The patient is optimized and acceptable candidate for proposed procedure. Arrival time, NPO, shower and medication instructions provided by nursing staff today.      On the day of service:     Prep time: 10  minutes  Visit time: 15 minutes  Documentation time: 25minutes  ------------------------------------------  Total time: 50 minutes      JERRY Flores CNS  Preoperative Assessment Center  [facility]  Clinic and Surgery Center  Phone: [phone]  Fax:  [phone]

## 2022-01-25 NOTE — ANESTHESIA PREPROCEDURE EVALUATION
Anesthesia Pre-Procedure Evaluation    Patient: Connor Emerson   MRN: [id number] : 1980        Preoperative Diagnosis: Mediastinal lymphadenopathy [R59.0]    Procedure : Procedure(s):  BRONCHOSCOPY, FIBEROPTIC, endobronchial ultrasound, transbronchial biopsies  Pleuroscopy with Pleural Biopsy  PAC EVALUATION       Past Medical History:   Diagnosis Date     Atypical chest pain 2013     Complication of anesthesia      Diabetes (H)      GERD (gastroesophageal reflux disease) 2013     HTN (hypertension) 2012     HTN, goal below 140/90 2013     Insomnia 2012     Mediastinal lymphadenopathy      Migraine headache 2013     Migraine with aura, without mention of intractable migraine without mention of status migrainosus      Pneumonia       Past Surgical History:   Procedure Laterality Date     INJECT BLOCK MEDIAL BRANCH CERVICAL/THORACIC/LUMBAR       ORTHOPEDIC SURGERY      Ganesh. Rotator cuff repair.      Allergies   Allergen Reactions     Vicodin [Hydrocodone-Acetaminophen] Nausea and Vomiting and GI Disturbance      Social History     Tobacco Use     Smoking status: Never Smoker     Smokeless tobacco: Never Used   Substance Use Topics     Alcohol use: Yes     Alcohol/week: 0.0 standard drinks     Comment: social      Wt Readings from Last 1 Encounters:   22 101.7 kg (224 lb 4.8 oz)        Anesthesia Evaluation   Pt has had prior anesthetic. Type: General.    History of anesthetic complications   Patient reports difficulty with anesthesia in  with left shoulder rotator cuff repair. Was reportedly given no information about issues, but able to go home. No anesthesia since..    ROS/MED HX  ENT/Pulmonary: Comment: Massive right-sided pleural effusion with midline shift, mediastinal lymph node enlargement, left lung pulmonary nodule. During the admission he had thoracentesis x2 by interventional radiology.    Today shortness of breath and cough    (+) JERROD risk factors,  hypertension, observed stopped breathing,  (-) tobacco use   Neurologic:     (+) migraines,     Cardiovascular: Comment: No activity limitations prior to acute shortness of breath incident/admission.    (+) hypertension-range: 146/100/ ----Previous cardiac testing   Echo: Date: 1/20/22 Results:    Stress Test: Date: Results:    ECG Reviewed: Date: 1/19/22 Results:  ST, read as junctional ST depression  Cath: Date: Results:   (-) taking anticoagulants/antiplatelets   METS/Exercise Tolerance: >4 METS Comment: Had been treated in the past for HYPERTENSION but no longer takes medication.   Hematologic:  - neg hematologic  ROS     Musculoskeletal: Comment: Bilateral shoulder surgeries      GI/Hepatic: Comment: Significant heartburn symptoms but is not taking medications    (+) GERD, Symptomatic,     Renal/Genitourinary:  - neg Renal ROS     Endo: Comment: Patient was not taking insulin prior to admission, was given insulin in the hospital but has not taken since he returned home.     (+) type II DM, Last HgA1c: 10.1, date: 1/19/22, Using insulin, - not using insulin pump. Obesity,     Psychiatric/Substance Use:     (+) psychiatric history anxiety and depression     Infectious Disease:  - neg infectious disease ROS     Malignancy:  - neg malignancy ROS     Other:  - neg other ROS          Physical Exam    Airway      Comment: Thick neck    Mallampati: I   TM distance: > 3 FB   Neck ROM: limited   Mouth opening: > 3 cm    Respiratory Devices and Support         Dental       (+) upper retainer      Cardiovascular          Rhythm and rate: regular and tachycardia     Pulmonary       Comment: Decreased at right base    breath sounds clear to auscultation   (+) decreased breath sounds           OUTSIDE LABS:  CBC:   Lab Results   Component Value Date    WBC 7.8 01/20/2022    WBC 10.1 01/19/2022    HGB 14.0 01/20/2022    HGB 15.9 01/19/2022    HCT 43.4 01/20/2022    HCT 49.4 01/19/2022     01/20/2022      01/19/2022     BMP:   Lab Results   Component Value Date     01/21/2022     01/20/2022    POTASSIUM 3.7 01/21/2022    POTASSIUM 3.8 01/20/2022    CHLORIDE 108 01/21/2022    CHLORIDE 109 01/20/2022    CO2 28 01/21/2022    CO2 29 01/20/2022    BUN 12 01/21/2022    BUN 12 01/20/2022    CR 0.52 (L) 01/21/2022    CR 0.52 (L) 01/20/2022     (H) 01/22/2022     (H) 01/22/2022     COAGS:   Lab Results   Component Value Date    INR 1.01 04/25/2015     POC:   Lab Results   Component Value Date     (H) 04/27/2017     HEPATIC:   Lab Results   Component Value Date    ALBUMIN 2.2 (L) 01/20/2022    PROTTOTAL 5.7 (L) 01/20/2022    ALT 18 01/20/2022    AST 8 01/20/2022    ALKPHOS 69 01/20/2022    BILITOTAL 0.3 01/20/2022     OTHER:   Lab Results   Component Value Date    A1C 10.1 (H) 01/19/2022    TORY 8.6 01/21/2022    LIPASE 98 01/11/2006    TSH 1.81 01/19/2022    CRP 9.3 (H) 04/27/2017    SED 7 06/01/2016       Anesthesia Plan    ASA Status:  3   NPO Status:  NPO Appropriate    Anesthesia Type: General.     - Airway: ETT   Induction: Intravenous, Propofol.   Maintenance: Balanced.   Techniques and Equipment:     - Airway: Fiberoptic Bronchoscope, Video-Laryngoscope, Double lumen ETT         Consents    Anesthesia Plan(s) and associated risks, benefits, and realistic alternatives discussed. Questions answered and patient/representative(s) expressed understanding.    - Discussed:     - Discussed with:  Patient      - Extended Intubation/Ventilatory Support Discussed: No.      - Patient is DNR/DNI Status: No    Use of blood products discussed: No .     Postoperative Care    Pain management: IV analgesics.   PONV prophylaxis: Ondansetron (or other 5HT-3), Background Propofol Infusion     Comments:              PAC Discussion and Assessment    ASA Classification: 3  Case is suitable for: [place]  Anesthetic techniques and relevant risks discussed: GA  Invasive monitoring and risk discussed:  No    Possibility and Risk of blood transfusion discussed: No            PAC Resident/NP Anesthesia Assessment: ASSESSMENT and PLAN  Connor Emerson is a 41 year old male who presents for pre-operative H & P in preparation for BRONCHOSCOPY, FIBEROPTIC, endobronchial ultrasound, transbronchial biopsies, Pleuroscopy with Pleural Biopsy with Dr. Agrawal on 1/26/22 at [facility].   RCRI: 0.9% risk of serious cardiac event  Risk classification for surgery procedure: Low  VTE risk: 0.5-3%  JERROD: 5/8=High risk   PONV: 2.    --Massive right pleural effusion, mediastinal lymph node enlargement, and left lung pulmonary nodule. S/p thoracenteses X 2 while hospitalized. Above procedure planned for further evaluation. Persistent shortness of breath. 02 sat 93% today. Respirations 28-32.  --Reports history of anesthesia issue in 2013 at OSH while having shoulder surgery. Patient was not given any details. Was able to go home. No anesthesia since.   --HYPERTENSION. /100. Reported previous treatment with medication but stopped taking. No other cardiac history, symptoms or meds. Echocardiogram above essentially normal. Was very active prior to admission.   --High risk for JERROD. Has never been tested.   --GERD. Significant symptoms. Does not take medication.   --History of migraines.   --DIABETES MELLITUS II Last A1c 10.1. Has been prescribed insulin and was given doses while hospitalized but stopped after he got home. Does not monitor BS.  --History of anxiety/depression. No meds.       Patient was discussed with Dr Leon.       The patient is optimized and acceptable candidate for proposed procedure. Arrival time, NPO, shower and medication instructions provided by nursing staff today.      Reviewed and Signed by PAC Mid-Level Provider/Resident  Mid-Level Provider/Resident: JERRY Burns, CNS  Date: 1/24/22  Time: 11:00am                               JERRY Flores

## 2022-01-25 NOTE — H&P (VIEW-ONLY)
Pre-Operative H & P     CC:  Preoperative exam to assess for increased cardiopulmonary risk while undergoing surgery and anesthesia.    Date of Encounter: 1/25/2022  Primary Care Physician:  Radha Shetty Ra     Reason for visit:   Encounter Diagnoses   Name Primary?     Preop examination Yes     Mediastinal lymphadenopathy        HPI  Connor Emerson is a 41 year old male who presents for pre-operative H & P in preparation for BRONCHOSCOPY, FIBEROPTIC, endobronchial ultrasound, transbronchial biopsies, Pleuroscopy with Pleural Biopsy with Dr. Agrawal on 1/26/22 at [facility].     History is obtained from the patient, wife and chart review    Patient who is nonsmoker and without history of respiratory illness presented, and was admitted on 1/19/22 with shortness of breath and was found to have a massive right-sided pleural effusion with midline shift, mediastinal lymph node enlargement, left lung pulmonary nodule. During the admission he had thoracentesis x2 by interventional radiology, first time 1.7 liters obtained and last 2 liters. Cytology pending. He was started on antibiotics. Pulmonary consulted while in the hospital and recommended bronchoscopy with biopsies of mediastinal lymph nodes due to concern for malignancy or an occlusive endobronchial lesion causing complete right lung opacification and pleural effusion. He was counseled for above procedure    His history is otherwise significant for obesity, GERD, DM II, migraine, and anxiety/depression.    Today patient denies fever, chest pain, irregular HR, or ankle edema. When he first arrived he was coughing, and had shortness of breath with conversation. Coughing resolved after he rested. He remained somewhat short of breath throughout, but reported that this morning he was breathing better than he had since his discharge.        Hx of abnormal bleeding or anti-platelet use: Denies        Prior to Admission  Medications  Current Outpatient Medications   Medication Sig Dispense Refill     amoxicillin-clavulanate (AUGMENTIN) 875-125 MG tablet Take 1 tablet by mouth 2 times daily for 4 days 8 tablet 0     blood glucose (NO BRAND SPECIFIED) lancets standard Use to test blood sugar 1 times daily or as directed. 100 each 0     blood glucose (NO BRAND SPECIFIED) test strip Use to test blood sugar 1 times daily or as directed. 1 Box 0     blood glucose monitoring (NO BRAND SPECIFIED) meter device kit Use to test blood sugar 1 times daily or as directed. 1 kit 0     insulin glargine (LANTUS PEN) 100 UNIT/ML pen Inject 15 Units Subcutaneous At Bedtime (Patient not taking: Reported on 1/20/2022) 3 mL 0     insulin pen needle (31G X 8 MM) 31G X 8 MM miscellaneous Use one pen needles daily or as directed. 100 each 0       Family History  Family History   Problem Relation Age of Onset     Unknown/Adopted Mother      Unknown/Adopted Father      Unknown/Adopted Paternal Grandmother      Unknown/Adopted Paternal Grandfather      Unknown/Adopted Maternal Grandfather      Unknown/Adopted Maternal Grandmother        Past Medical History:   Diagnosis Date     Atypical chest pain 12/02/2013     Complication of anesthesia      Diabetes (H)      GERD (gastroesophageal reflux disease) 12/02/2013     HTN (hypertension) 05/14/2012     HTN, goal below 140/90 07/02/2013     Insomnia 02/21/2012     Mediastinal lymphadenopathy      Migraine headache 07/02/2013     Migraine with aura, without mention of intractable migraine without mention of status migrainosus      Pneumonia       Past Surgical History:   Procedure Laterality Date     INJECT BLOCK MEDIAL BRANCH CERVICAL/THORACIC/LUMBAR       ORTHOPEDIC SURGERY      Ganesh. Rotator cuff repair.      Allergies   Allergen Reactions     Vicodin [Hydrocodone-Acetaminophen] Nausea and Vomiting and GI Disturbance      Social History     Tobacco Use     Smoking status: Never Smoker     Smokeless tobacco: Never  Used   Substance Use Topics     Alcohol use: Yes     Alcohol/week: 0.0 standard drinks     Comment: social      Wt Readings from Last 1 Encounters:   01/22/22 101.7 kg (224 lb 4.8 oz)        Anesthesia Evaluation     History of anesthetic complications   Patient reports difficulty with anesthesia in 2013 with left shoulder rotator cuff repair. Was reportedly given no information about issues, but able to go home. No anesthesia since..    ROS/MED HX  ENT/Pulmonary: Comment: Massive right-sided pleural effusion with midline shift, mediastinal lymph node enlargement, left lung pulmonary nodule. During the admission he had thoracentesis x2 by interventional radiology.    Today shortness of breath and cough    (+) JERROD risk factors, hypertension, observed stopped breathing,  (-) tobacco use   Neurologic:     (+) migraines,     Cardiovascular: Comment: No activity limitations prior to acute shortness of breath incident/admission.    (+) hypertension-range: 146/100/ ----Previous cardiac testing   Echo: Date: Results:    Stress Test: Date: Results:    ECG Reviewed: Date: 1/19/22 Results:  ST, read as junctional ST depression  Cath: Date: Results:   (-) taking anticoagulants/antiplatelets   METS/Exercise Tolerance: >4 METS Comment: Had been treated in the past for HYPERTENSION but no longer takes medication.   Hematologic:  - neg hematologic  ROS     Musculoskeletal: Comment: Bilateral shoulder surgeries      GI/Hepatic: Comment: Significant heartburn symptoms but is not taking medications    (+) GERD,     Renal/Genitourinary:  - neg Renal ROS     Endo: Comment: Patient was not taking insulin prior to admission, was given insulin in the hospital but has not taken since he returned home.     (+) type II DM, Last HgA1c: 10.1, date: 1/19/22, Using insulin, - not using insulin pump. Obesity,     Psychiatric/Substance Use:     (+) psychiatric history anxiety and depression     Infectious Disease:  - neg infectious disease ROS    "  Malignancy:  - neg malignancy ROS     Other:  - neg other ROS        LABS:  CBC:   Lab Results   Component Value Date    WBC 7.8 01/20/2022    WBC 10.1 01/19/2022    HGB 14.0 01/20/2022    HGB 15.9 01/19/2022    HCT 43.4 01/20/2022    HCT 49.4 01/19/2022     01/20/2022     01/19/2022     BMP:   Lab Results   Component Value Date     01/21/2022     01/20/2022    POTASSIUM 3.7 01/21/2022    POTASSIUM 3.8 01/20/2022    CHLORIDE 108 01/21/2022    CHLORIDE 109 01/20/2022    CO2 28 01/21/2022    CO2 29 01/20/2022    BUN 12 01/21/2022    BUN 12 01/20/2022    CR 0.52 (L) 01/21/2022    CR 0.52 (L) 01/20/2022     (H) 01/22/2022     (H) 01/22/2022     COAGS:   Lab Results   Component Value Date    INR 1.01 04/25/2015     POC:   Lab Results   Component Value Date     (H) 04/27/2017     HEPATIC:   Lab Results   Component Value Date    ALBUMIN 2.2 (L) 01/20/2022    PROTTOTAL 5.7 (L) 01/20/2022    ALT 18 01/20/2022    AST 8 01/20/2022    ALKPHOS 69 01/20/2022    BILITOTAL 0.3 01/20/2022     OTHER:   Lab Results   Component Value Date    A1C 10.1 (H) 01/19/2022    TORY 8.6 01/21/2022    LIPASE 98 01/11/2006    TSH 1.81 01/19/2022    CRP 9.3 (H) 04/27/2017    SED 7 06/01/2016     BP (!) 146/100 (BP Location: Right arm, Patient Position: Chair, Cuff Size: Adult Large)   Pulse 102   Temp 98.4  F (36.9  C) (Oral)   Resp 28   Ht 1.753 m (5' 9\")   Wt 103.6 kg (228 lb 4.8 oz)   SpO2 93%   BMI 33.71 kg/m        Physical Exam  Constitutional: Awake, alert, cooperative, and appears stated age. Accompanied by wife.  Eyes: Pupils equal, round and reactive to light, extra ocular muscles intact, sclera clear, conjunctiva normal.  HENT: Normocephalic, oral pharynx with moist mucus membranes, good dentition. No goiter appreciated. Thick neck.  Respiratory: Clear to auscultation bilaterally, no crackles or wheezing. Decreased breath sounds left base. Some coughing when he first arrived. Dyspnea " when speaking.   Cardiovascular: Regular rate and rhythm, normal S1 and S2, and no murmur noted.  Carotids +2, no bruits. No edema. Palpable pulses to radial  DP and PT arteries.   GI: Normal bowel sounds, soft, non-distended, non-tender, no masses palpated.  Lymph/Hematologic: No cervical lymphadenopathy and no supraclavicular lymphadenopathy.  Genitourinary: Deferred  Skin: Warm and dry.  No rashes at anticipated surgical site.   Musculoskeletal: Limited ROM of neck. There is no redness, warmth, or swelling of the joints. Gross motor strength is normal.    Neurologic: Awake, alert, oriented to name, place and time. Cranial nerves II-XII are grossly intact. Gait is normal.   Neuropsychiatric: Calm, cooperative. Normal affect.     PRIOR LABS/DIAGNOSTIC STUDIES:   All labs and imaging personally reviewed     EK22 Sinus tachycardia, read as junctional ST depression    Echocardiogram  22    Interpretation Summary    1. Normal left ventricular size and systolic function. LVEF 60-65%.    2. No regional wall motion abnormalities.    3. Normal right ventricular size and systolic function.    4. No significant valve disease.    5. The inferior vena cava is normal.    There is no comparison study available.    22 CT Chest                                                    IMPRESSION:    1.  No pulmonary embolism.         2.  Massive right pleural effusion with right to left mediastinal shift. Underlying compressive consolidation right lung. Mucus or debris right lower lobe bronchus.         3.  Several indeterminate nodules in the left lung suspicious for possible metastases.         4.  Subcarinal lymphadenopathy.      CXR 22  IMPRESSION: Moderate right effusion, markedly improved from previous. Left lung clear. No pneumothorax.    The patient's records and results personally reviewed by this provider.     Outside records reviewed from: care everywhere    ASSESSMENT and PLAN  Connor Emerson is a 41  year old male who presents for pre-operative H & P in preparation for BRONCHOSCOPY, FIBEROPTIC, endobronchial ultrasound, transbronchial biopsies, Pleuroscopy with Pleural Biopsy with Dr. Agrawal on 1/26/22 at [facility].   RCRI: 0.9% risk of serious cardiac event  Risk classification for surgery procedure: Low  VTE risk: 0.5-3%  JERROD: 5/8=High risk   PONV: 2.    --Massive right pleural effusion, mediastinal lymph node enlargement, and left lung pulmonary nodule. S/p thoracenteses X 2 while hospitalized. Above procedure planned for further evaluation. Persistent shortness of breath. 02 sat 93% today. Respirations 28-32.  --Reports history of anesthesia issue in 2013 at OSH while having shoulder surgery. Patient was not given any details. Was able to go home. No anesthesia since.   --HYPERTENSION. /100. Reported previous treatment with medication but stopped taking. No other cardiac history, symptoms or meds. Echocardiogram above essentially normal. Was very active prior to admission.   --High risk for JERROD. Has never been tested.   --GERD. Significant symptoms. Does not take medication.   --History of migraines.   --DIABETES MELLITUS II Last A1c 10.1. Has been prescribed insulin and was given doses while hospitalized but stopped after he got home. Does not monitor BS.  --History of anxiety/depression. No meds.       Patient was discussed with Dr Leno. Will proceed to urgent procedure.       The patient is optimized and acceptable candidate for proposed procedure. Arrival time, NPO, shower and medication instructions provided by nursing staff today.      On the day of service:     Prep time: 10  minutes  Visit time: 15 minutes  Documentation time: 25minutes  ------------------------------------------  Total time: 50 minutes      JERRY Flores CNS  Preoperative Assessment Center  [facility]  Phone: [phone]  Fax:  [phone]

## 2022-01-26 ENCOUNTER — HOSPITAL ENCOUNTER (OUTPATIENT)
Facility: CLINIC | Age: 42
Discharge: HOME | End: 2022-01-26
Attending: INTERNAL MEDICINE | Admitting: INTERNAL MEDICINE

## 2022-01-26 ENCOUNTER — PATIENT OUTREACH (OUTPATIENT)
Dept: ONCOLOGY | Facility: CLINIC | Age: 42
End: 2022-01-26

## 2022-01-26 ENCOUNTER — ANESTHESIA (OUTPATIENT)
Dept: SURGERY | Facility: CLINIC | Age: 42
End: 2022-01-26

## 2022-01-26 ENCOUNTER — APPOINTMENT (OUTPATIENT)
Dept: GENERAL RADIOLOGY | Facility: CLINIC | Age: 42
End: 2022-01-26
Attending: STUDENT IN AN ORGANIZED HEALTH CARE EDUCATION/TRAINING PROGRAM

## 2022-01-26 VITALS
OXYGEN SATURATION: 91 % | HEIGHT: 69 IN | HEART RATE: 101 BPM | TEMPERATURE: 97.9 F | BODY MASS INDEX: 33.7 KG/M2 | WEIGHT: 227.51 LBS | DIASTOLIC BLOOD PRESSURE: 78 MMHG | SYSTOLIC BLOOD PRESSURE: 117 MMHG | RESPIRATION RATE: 16 BRPM

## 2022-01-26 DIAGNOSIS — R59.0 MEDIASTINAL LYMPHADENOPATHY: Primary | ICD-10-CM

## 2022-01-26 DIAGNOSIS — C34.90 NON-SMALL CELL LUNG CANCER, UNSPECIFIED LATERALITY (H): ICD-10-CM

## 2022-01-26 LAB
BACTERIA PLR CULT: NO GROWTH
GLUCOSE BLDC GLUCOMTR-MCNC: 202 MG/DL (ref 70–99)
GLUCOSE BLDC GLUCOMTR-MCNC: 238 MG/DL (ref 70–99)
GLUCOSE BLDC GLUCOMTR-MCNC: 256 MG/DL (ref 70–99)
GLUCOSE BLDC GLUCOMTR-MCNC: 266 MG/DL (ref 70–99)
GLUCOSE BLDC GLUCOMTR-MCNC: 279 MG/DL (ref 70–99)
GRAM STAIN RESULT: NORMAL
GRAM STAIN RESULT: NORMAL

## 2022-01-26 PROCEDURE — 88341 IMHCHEM/IMCYTCHM EA ADD ANTB: CPT | Mod: 26 | Performed by: PATHOLOGY

## 2022-01-26 PROCEDURE — 250N000013 HC RX MED GY IP 250 OP 250 PS 637: Performed by: ANESTHESIOLOGY

## 2022-01-26 PROCEDURE — 88342 IMHCHEM/IMCYTCHM 1ST ANTB: CPT | Mod: 26 | Performed by: PATHOLOGY

## 2022-01-26 PROCEDURE — 88305 TISSUE EXAM BY PATHOLOGIST: CPT | Mod: 26 | Performed by: PATHOLOGY

## 2022-01-26 PROCEDURE — 258N000003 HC RX IP 258 OP 636: Performed by: ANESTHESIOLOGY

## 2022-01-26 PROCEDURE — 71045 X-RAY EXAM CHEST 1 VIEW: CPT | Mod: 26 | Performed by: RADIOLOGY

## 2022-01-26 PROCEDURE — 370N000017 HC ANESTHESIA TECHNICAL FEE, PER MIN: Performed by: INTERNAL MEDICINE

## 2022-01-26 PROCEDURE — 710N000012 HC RECOVERY PHASE 2, PER MINUTE: Performed by: INTERNAL MEDICINE

## 2022-01-26 PROCEDURE — 710N000010 HC RECOVERY PHASE 1, LEVEL 2, PER MIN: Performed by: INTERNAL MEDICINE

## 2022-01-26 PROCEDURE — 250N000009 HC RX 250: Performed by: ANESTHESIOLOGY

## 2022-01-26 PROCEDURE — 88360 TUMOR IMMUNOHISTOCHEM/MANUAL: CPT | Mod: 26 | Performed by: PATHOLOGY

## 2022-01-26 PROCEDURE — 250N000011 HC RX IP 250 OP 636: Performed by: INTERNAL MEDICINE

## 2022-01-26 PROCEDURE — 88305 TISSUE EXAM BY PATHOLOGIST: CPT | Mod: TC | Performed by: INTERNAL MEDICINE

## 2022-01-26 PROCEDURE — 999N000141 HC STATISTIC PRE-PROCEDURE NURSING ASSESSMENT: Performed by: INTERNAL MEDICINE

## 2022-01-26 PROCEDURE — 250N000009 HC RX 250: Performed by: NURSE ANESTHETIST, CERTIFIED REGISTERED

## 2022-01-26 PROCEDURE — 250N000011 HC RX IP 250 OP 636: Performed by: ANESTHESIOLOGY

## 2022-01-26 PROCEDURE — 360N000083 HC SURGERY LEVEL 3 W/ FLUORO, PER MIN: Performed by: INTERNAL MEDICINE

## 2022-01-26 PROCEDURE — 94640 AIRWAY INHALATION TREATMENT: CPT

## 2022-01-26 PROCEDURE — 32609 THORACOSCOPY W/BX PLEURA: CPT | Performed by: INTERNAL MEDICINE

## 2022-01-26 PROCEDURE — 250N000013 HC RX MED GY IP 250 OP 250 PS 637: Performed by: STUDENT IN AN ORGANIZED HEALTH CARE EDUCATION/TRAINING PROGRAM

## 2022-01-26 PROCEDURE — 250N000009 HC RX 250: Performed by: INTERNAL MEDICINE

## 2022-01-26 PROCEDURE — 250N000025 HC SEVOFLURANE, PER MIN: Performed by: INTERNAL MEDICINE

## 2022-01-26 PROCEDURE — 82962 GLUCOSE BLOOD TEST: CPT

## 2022-01-26 PROCEDURE — 999N000065 XR CHEST PORT 1 VIEW

## 2022-01-26 PROCEDURE — 272N000001 HC OR GENERAL SUPPLY STERILE: Performed by: INTERNAL MEDICINE

## 2022-01-26 RX ORDER — CEFAZOLIN SODIUM 1 G/50ML
2 SOLUTION INTRAVENOUS SEE ADMIN INSTRUCTIONS
Status: DISCONTINUED | OUTPATIENT
Start: 2022-01-26 | End: 2022-01-26 | Stop reason: HOSPADM

## 2022-01-26 RX ORDER — OXYCODONE HYDROCHLORIDE 5 MG/1
5 TABLET ORAL
Status: COMPLETED | OUTPATIENT
Start: 2022-01-26 | End: 2022-01-26

## 2022-01-26 RX ORDER — ONDANSETRON 2 MG/ML
INJECTION INTRAMUSCULAR; INTRAVENOUS PRN
Status: DISCONTINUED | OUTPATIENT
Start: 2022-01-26 | End: 2022-01-26

## 2022-01-26 RX ORDER — IPRATROPIUM BROMIDE AND ALBUTEROL SULFATE 2.5; .5 MG/3ML; MG/3ML
3 SOLUTION RESPIRATORY (INHALATION) ONCE
Status: COMPLETED | OUTPATIENT
Start: 2022-01-26 | End: 2022-01-26

## 2022-01-26 RX ORDER — HYDROMORPHONE HYDROCHLORIDE 1 MG/ML
.2-.4 INJECTION, SOLUTION INTRAMUSCULAR; INTRAVENOUS; SUBCUTANEOUS EVERY 5 MIN PRN
Status: DISCONTINUED | OUTPATIENT
Start: 2022-01-26 | End: 2022-01-26 | Stop reason: HOSPADM

## 2022-01-26 RX ORDER — ONDANSETRON 2 MG/ML
4 INJECTION INTRAMUSCULAR; INTRAVENOUS EVERY 30 MIN PRN
Status: DISCONTINUED | OUTPATIENT
Start: 2022-01-26 | End: 2022-01-26 | Stop reason: HOSPADM

## 2022-01-26 RX ORDER — SODIUM CHLORIDE, SODIUM LACTATE, POTASSIUM CHLORIDE, CALCIUM CHLORIDE 600; 310; 30; 20 MG/100ML; MG/100ML; MG/100ML; MG/100ML
INJECTION, SOLUTION INTRAVENOUS CONTINUOUS
Status: DISCONTINUED | OUTPATIENT
Start: 2022-01-26 | End: 2022-01-26 | Stop reason: HOSPADM

## 2022-01-26 RX ORDER — SODIUM CHLORIDE, SODIUM LACTATE, POTASSIUM CHLORIDE, CALCIUM CHLORIDE 600; 310; 30; 20 MG/100ML; MG/100ML; MG/100ML; MG/100ML
INJECTION, SOLUTION INTRAVENOUS CONTINUOUS PRN
Status: DISCONTINUED | OUTPATIENT
Start: 2022-01-26 | End: 2022-01-26

## 2022-01-26 RX ORDER — ONDANSETRON 4 MG/1
4 TABLET, ORALLY DISINTEGRATING ORAL EVERY 30 MIN PRN
Status: DISCONTINUED | OUTPATIENT
Start: 2022-01-26 | End: 2022-01-26 | Stop reason: HOSPADM

## 2022-01-26 RX ORDER — ESMOLOL HYDROCHLORIDE 10 MG/ML
INJECTION INTRAVENOUS PRN
Status: DISCONTINUED | OUTPATIENT
Start: 2022-01-26 | End: 2022-01-26

## 2022-01-26 RX ORDER — HYDRALAZINE HYDROCHLORIDE 20 MG/ML
2.5-5 INJECTION INTRAMUSCULAR; INTRAVENOUS EVERY 10 MIN PRN
Status: DISCONTINUED | OUTPATIENT
Start: 2022-01-26 | End: 2022-01-26 | Stop reason: HOSPADM

## 2022-01-26 RX ORDER — LIDOCAINE HYDROCHLORIDE 20 MG/ML
INJECTION, SOLUTION INFILTRATION; PERINEURAL PRN
Status: DISCONTINUED | OUTPATIENT
Start: 2022-01-26 | End: 2022-01-26

## 2022-01-26 RX ORDER — MEPERIDINE HYDROCHLORIDE 25 MG/ML
12.5 INJECTION INTRAMUSCULAR; INTRAVENOUS; SUBCUTANEOUS
Status: DISCONTINUED | OUTPATIENT
Start: 2022-01-26 | End: 2022-01-26 | Stop reason: HOSPADM

## 2022-01-26 RX ORDER — CEFAZOLIN SODIUM 1 G/50ML
2 SOLUTION INTRAVENOUS
Status: COMPLETED | OUTPATIENT
Start: 2022-01-26 | End: 2022-01-26

## 2022-01-26 RX ORDER — ALBUTEROL SULFATE 0.83 MG/ML
2.5 SOLUTION RESPIRATORY (INHALATION) EVERY 4 HOURS PRN
Status: DISCONTINUED | OUTPATIENT
Start: 2022-01-26 | End: 2022-01-26 | Stop reason: HOSPADM

## 2022-01-26 RX ORDER — PROPOFOL 10 MG/ML
INJECTION, EMULSION INTRAVENOUS CONTINUOUS PRN
Status: DISCONTINUED | OUTPATIENT
Start: 2022-01-26 | End: 2022-01-26

## 2022-01-26 RX ORDER — FENTANYL CITRATE 50 UG/ML
25 INJECTION, SOLUTION INTRAMUSCULAR; INTRAVENOUS
Status: DISCONTINUED | OUTPATIENT
Start: 2022-01-26 | End: 2022-01-26 | Stop reason: HOSPADM

## 2022-01-26 RX ORDER — ACETAMINOPHEN 325 MG/1
975 TABLET ORAL
Status: COMPLETED | OUTPATIENT
Start: 2022-01-26 | End: 2022-01-26

## 2022-01-26 RX ORDER — PHENYLEPHRINE HCL IN 0.9% NACL 50MG/250ML
.1-1 PLASTIC BAG, INJECTION (ML) INTRAVENOUS CONTINUOUS
Status: DISCONTINUED | OUTPATIENT
Start: 2022-01-26 | End: 2022-01-26 | Stop reason: HOSPADM

## 2022-01-26 RX ORDER — PROPOFOL 10 MG/ML
INJECTION, EMULSION INTRAVENOUS PRN
Status: DISCONTINUED | OUTPATIENT
Start: 2022-01-26 | End: 2022-01-26

## 2022-01-26 RX ORDER — ALBUTEROL SULFATE 90 UG/1
AEROSOL, METERED RESPIRATORY (INHALATION) PRN
Status: DISCONTINUED | OUTPATIENT
Start: 2022-01-26 | End: 2022-01-26

## 2022-01-26 RX ORDER — OXYCODONE HYDROCHLORIDE 5 MG/1
5 TABLET ORAL EVERY 4 HOURS PRN
Status: DISCONTINUED | OUTPATIENT
Start: 2022-01-26 | End: 2022-01-26 | Stop reason: HOSPADM

## 2022-01-26 RX ORDER — LABETALOL HYDROCHLORIDE 5 MG/ML
5 INJECTION, SOLUTION INTRAVENOUS EVERY 5 MIN PRN
Status: DISCONTINUED | OUTPATIENT
Start: 2022-01-26 | End: 2022-01-26 | Stop reason: HOSPADM

## 2022-01-26 RX ORDER — FENTANYL CITRATE 50 UG/ML
25-50 INJECTION, SOLUTION INTRAMUSCULAR; INTRAVENOUS EVERY 5 MIN PRN
Status: DISCONTINUED | OUTPATIENT
Start: 2022-01-26 | End: 2022-01-26 | Stop reason: HOSPADM

## 2022-01-26 RX ORDER — FENTANYL CITRATE 50 UG/ML
INJECTION, SOLUTION INTRAMUSCULAR; INTRAVENOUS PRN
Status: DISCONTINUED | OUTPATIENT
Start: 2022-01-26 | End: 2022-01-26

## 2022-01-26 RX ADMIN — SODIUM CHLORIDE, POTASSIUM CHLORIDE, SODIUM LACTATE AND CALCIUM CHLORIDE: 600; 310; 30; 20 INJECTION, SOLUTION INTRAVENOUS at 08:50

## 2022-01-26 RX ADMIN — HUMAN INSULIN 5 UNITS: 100 INJECTION, SOLUTION SUBCUTANEOUS at 10:47

## 2022-01-26 RX ADMIN — ESMOLOL HYDROCHLORIDE 30 MG: 10 INJECTION, SOLUTION INTRAVENOUS at 09:50

## 2022-01-26 RX ADMIN — PHENYLEPHRINE HYDROCHLORIDE 200 MCG: 10 INJECTION INTRAVENOUS at 08:16

## 2022-01-26 RX ADMIN — ROCURONIUM BROMIDE 50 MG: 50 INJECTION, SOLUTION INTRAVENOUS at 07:52

## 2022-01-26 RX ADMIN — LIDOCAINE HYDROCHLORIDE 100 MG: 20 INJECTION, SOLUTION INFILTRATION; PERINEURAL at 07:50

## 2022-01-26 RX ADMIN — SODIUM CHLORIDE, POTASSIUM CHLORIDE, SODIUM LACTATE AND CALCIUM CHLORIDE: 600; 310; 30; 20 INJECTION, SOLUTION INTRAVENOUS at 07:41

## 2022-01-26 RX ADMIN — ALBUTEROL SULFATE 2 PUFF: 108 INHALANT RESPIRATORY (INHALATION) at 09:25

## 2022-01-26 RX ADMIN — PHENYLEPHRINE HYDROCHLORIDE 200 MCG: 10 INJECTION INTRAVENOUS at 08:30

## 2022-01-26 RX ADMIN — Medication 1 MCG/KG/MIN: at 08:48

## 2022-01-26 RX ADMIN — ALBUTEROL SULFATE 2 PUFF: 108 INHALANT RESPIRATORY (INHALATION) at 09:26

## 2022-01-26 RX ADMIN — ESMOLOL HYDROCHLORIDE 20 MG: 10 INJECTION, SOLUTION INTRAVENOUS at 09:47

## 2022-01-26 RX ADMIN — PHENYLEPHRINE HYDROCHLORIDE 100 MCG: 10 INJECTION INTRAVENOUS at 08:47

## 2022-01-26 RX ADMIN — NOREPINEPHRINE BITARTRATE 12.8 MCG: 1 INJECTION, SOLUTION, CONCENTRATE INTRAVENOUS at 08:35

## 2022-01-26 RX ADMIN — CEFAZOLIN 2 G: 10 INJECTION, POWDER, FOR SOLUTION INTRAVENOUS at 07:41

## 2022-01-26 RX ADMIN — NOREPINEPHRINE BITARTRATE 12.8 MCG: 1 INJECTION, SOLUTION, CONCENTRATE INTRAVENOUS at 08:40

## 2022-01-26 RX ADMIN — MIDAZOLAM 2 MG: 1 INJECTION INTRAMUSCULAR; INTRAVENOUS at 07:50

## 2022-01-26 RX ADMIN — IPRATROPIUM BROMIDE AND ALBUTEROL SULFATE 3 ML: .5; 3 SOLUTION RESPIRATORY (INHALATION) at 12:42

## 2022-01-26 RX ADMIN — NOREPINEPHRINE BITARTRATE 6.4 MCG: 1 INJECTION, SOLUTION, CONCENTRATE INTRAVENOUS at 08:30

## 2022-01-26 RX ADMIN — ROCURONIUM BROMIDE 20 MG: 50 INJECTION, SOLUTION INTRAVENOUS at 08:31

## 2022-01-26 RX ADMIN — FENTANYL CITRATE 100 MCG: 50 INJECTION, SOLUTION INTRAMUSCULAR; INTRAVENOUS at 07:50

## 2022-01-26 RX ADMIN — HUMAN INSULIN 3 UNITS: 100 INJECTION, SOLUTION SUBCUTANEOUS at 06:58

## 2022-01-26 RX ADMIN — PHENYLEPHRINE HYDROCHLORIDE 200 MCG: 10 INJECTION INTRAVENOUS at 08:45

## 2022-01-26 RX ADMIN — PROPOFOL 150 MCG/KG/MIN: 10 INJECTION, EMULSION INTRAVENOUS at 07:53

## 2022-01-26 RX ADMIN — OXYCODONE HYDROCHLORIDE 5 MG: 5 TABLET ORAL at 11:53

## 2022-01-26 RX ADMIN — PHENYLEPHRINE HYDROCHLORIDE 200 MCG: 10 INJECTION INTRAVENOUS at 08:24

## 2022-01-26 RX ADMIN — PHENYLEPHRINE HYDROCHLORIDE 200 MCG: 10 INJECTION INTRAVENOUS at 08:21

## 2022-01-26 RX ADMIN — PROPOFOL 200 MG: 10 INJECTION, EMULSION INTRAVENOUS at 07:50

## 2022-01-26 RX ADMIN — OXYCODONE HYDROCHLORIDE 5 MG: 5 TABLET ORAL at 13:23

## 2022-01-26 RX ADMIN — ALBUTEROL SULFATE 6 PUFF: 108 INHALANT RESPIRATORY (INHALATION) at 08:25

## 2022-01-26 RX ADMIN — ROCURONIUM BROMIDE 20 MG: 50 INJECTION, SOLUTION INTRAVENOUS at 09:05

## 2022-01-26 RX ADMIN — HUMAN INSULIN 5 UNITS: 100 INJECTION, SOLUTION SUBCUTANEOUS at 10:16

## 2022-01-26 RX ADMIN — SUGAMMADEX 200 MG: 100 INJECTION, SOLUTION INTRAVENOUS at 09:33

## 2022-01-26 RX ADMIN — ONDANSETRON 4 MG: 2 INJECTION INTRAMUSCULAR; INTRAVENOUS at 09:28

## 2022-01-26 RX ADMIN — ACETAMINOPHEN 975 MG: 325 TABLET, FILM COATED ORAL at 11:53

## 2022-01-26 RX ADMIN — PHENYLEPHRINE HYDROCHLORIDE 200 MCG: 10 INJECTION INTRAVENOUS at 08:07

## 2022-01-26 RX ADMIN — PHENYLEPHRINE HYDROCHLORIDE 200 MCG: 10 INJECTION INTRAVENOUS at 08:11

## 2022-01-26 RX ADMIN — HUMAN INSULIN 5 UNITS: 100 INJECTION, SOLUTION SUBCUTANEOUS at 11:16

## 2022-01-26 ASSESSMENT — MIFFLIN-ST. JEOR: SCORE: 1917.38

## 2022-01-26 NOTE — ANESTHESIA POSTPROCEDURE EVALUATION
Patient: Connor Emerson    Procedure: Procedure(s):  Right BRONCHOSCOPY, FIBEROPTIC, endobronchial ultrasound, pleural biopsy  Pleuroscopy with Pleural Biopsy       Diagnosis:Mediastinal lymphadenopathy [R59.0]  Diagnosis Additional Information: No value filed.    Anesthesia Type:  General    Note:  Disposition: Outpatient   Postop Pain Control: Uneventful            Sign Out: Well controlled pain   PONV: No   Neuro/Psych: Uneventful            Sign Out: Acceptable/Baseline neuro status   Airway/Respiratory: Uneventful            Sign Out: Acceptable/Baseline resp. status   CV/Hemodynamics: Uneventful            Sign Out: Acceptable CV status; No obvious hypovolemia; No obvious fluid overload   Other NRE: NONE   DID A NON-ROUTINE EVENT OCCUR? No    Event details/Postop Comments:  Given multiple doses of IV insulin for blood glucose management. I discussed following up with his primary care physician regarding diabetes management.            Last vitals:  Vitals Value Taken Time   /83 01/26/22 1040   Temp 37.1  C (98.8  F) 01/26/22 1010   Pulse 112 01/26/22 1041   Resp 16 01/26/22 1030   SpO2 94 % 01/26/22 1041   Vitals shown include unvalidated device data.    Electronically Signed By: Luciana Ham MD  January 26, 2022  10:42 AM

## 2022-01-26 NOTE — PROGRESS NOTES
Review of Oncology referral fr IP team for pt with lung cancer.    1/20/2022 ED to [facility]  for pleural effusion, cytology confirms malignancy.  Final Diagnosis   Pleural fluid, right side, cytology:                 Interpretation:                  Positive for pleural fluid involvement by adenocarcinoma.  See comment.       1/26/2022 bronch with IP Dr Agrawal, path pending.    Referring to Med Onc for further care; pt lives in [place], can offer [facility] or [facility] location per pt preference. Will need PET & MRI brain to complete staging.

## 2022-01-26 NOTE — ANESTHESIA PROCEDURE NOTES
Airway       Patient location during procedure: OR       Procedure Start/Stop Times: 1/26/2022 7:58 AM  Staff -        Anesthesiologist:  Art Shook MD       CRNA: Ana M Hickman APRN CRNA       Other Anesthesia Staff: Chelsea Le       Performed By: ZUNILDA and with CRNAs       Procedure performed by resident/fellow/CRNA in presence of a teaching physician.    Consent for Airway        Urgency: elective  Indications and Patient Condition       Indications for airway management: makenna-procedural       Induction type:intravenous       Mask difficulty assessment: 3 - difficult mask (inadequate, unstable, or two providers) +/- NMBA    Final Airway Details       Final airway type: endotracheal airway       Successful airway: ETT - double lumen left  Endotracheal Airway Details        Cuffed: yes       Successful intubation technique: video laryngoscopy and flexible bronchoscopy       VL Blade Size: MAC 3       Grade View of Cords: 1       Placement verification comments: (Fiberoptic bronchoscopy)       Adjucts: stylet       Position: Right       Measured from: lips       Secured at (cm): 31       Bite block used: None       ETT Double lumen (fr): 39    Post intubation assessment        Placement verified by: capnometry and equal breath sounds        Number of attempts at approach: 1       Number of other approaches attempted: 0       Secured with: silk tape (Tegaderm)       Ease of procedure: easy       Dentition: Intact and Unchanged

## 2022-01-26 NOTE — OR NURSING
Dr. Abbott called back and ok with xray results. Will come speak with patient when they are up and in phase 2. Ok to transfer to phase 2 when meets criteria.

## 2022-01-26 NOTE — BRIEF OP NOTE
[facility]    Brief Operative Note    Pre-operative diagnosis: Mediastinal lymphadenopathy [R59.0]  Post-operative diagnosis Same as pre-operative diagnosis    Procedure: Procedure(s):  Right BRONCHOSCOPY, FIBEROPTIC, endobronchial ultrasound, pleural biopsy  Pleuroscopy with Pleural Biopsy  Surgeon: Surgeon(s) and Role:     * Dallin Agrawal MD - Primary     * Kady Abbott MD - Fellow - Assisting  Anesthesia: General   Estimated Blood Loss: None    Drains: None  Specimens:   ID Type Source Tests Collected by Time Destination   1 : Right Pleural Mass Biopsy Pleural Cavity, Right SURGICAL PATHOLOGY EXAM Dallin Agrawal MD 1/26/2022  9:04 AM      Findings:   Right pleural disease , large effusion   Complications: None.  Implants: * No implants in log *

## 2022-01-26 NOTE — PROCEDURES
INTERVENTIONAL PULMONOLOGY       Procedure(s):    Chest ultrasound and interpretation (right chest)  Pleural biopsy (right chest)  Therapeutic suctioning (1 sites)    Indication:  Recurrent right sided pleural effusion     Attending of Record:  Dallin Agrawal MD     Interventional Pulmonary Fellow   Kady Abbott MD     Trainees Present:   None     Medications:    General Anesthesia - See anesthesia flowsheet for details    Sedation Time:   Per Anesthesia Care Provider    Time Out:  Performed    The patient's medical record has been reviewed.  The indication for the procedure was reviewed.  The necessary history and physical examination was performed and reviewed.  The risks, benefits and alternatives of the procedure were discussed with the the patient in detail and he had the opportunity to ask questions.  I discussed in particular the potential complications including risks of minor or life-threatening bleeding and/or infection, respiratory failure, vocal cord trauma / paralysis, pneumothorax, and discomfort. Sedation risks were also discussed including abnormal heart rhythms, low blood pressure, and respiratory failure. All questions were answered to the best of my ability.  Verbal and written informed consent was obtained.  The proposed procedure and the patient's identification were verified prior to the procedure by the physician and the nurse.    The patient was assessed for the adequacy for the procedure and to receive medications.   Mental Status:  Alert and oriented x 3  Airway examination:  Class II (Complete visualization of uvula)  Pulmonary:  Decreased breath sounds on right   CV:  RRR, no murmurs or gallops  ASA Grade:  (III)  Severe systemic disease    After clinical evaluation and reviewing the indication, risks, alternatives and benefits of the procedure the patient was deemed to be in satisfactory condition to undergo the procedure.      Immediately before administration of medications the  patient was re-assessed for adequacy to receive sedatives including the heart rate, respiratory rate, mental status, oxygen saturation, blood pressure and adequacy of pulmonary ventilation. These same parameters were continuously monitored throughout the procedure.    A Tuberculosis risk assessment was performed:  The patient has no known RISK of Tuberculosis    The procedure was performed in a negative airflow room: The patient could not be moved to a negative airflow room because of no risk of TB    Maneuvers / Procedure:      Chest ultrasound: The Right side was ultasounded and demonstrated pleural fluid. The diaphragm, heart and lung tissue were clearly visualized. Other findings include --> chest wall mass appreciated, large pleural fluid collection on right     Pleuroscopy: The patient was brought back to the operating room, placed supine on the operating table. General anesthesia was smoothly induced, endotracheal intubation was established. A surgical timeout was performed. The patient was positioned in the left  lateral decubitus position. The right  chest was prepped and draped in standard surgical fashion. We made sure all of his bony prominences were well padded and his upper extremity was not in any significant stretch. Preoperative antibiotics were given. A small, less than 1 cm incision was made in the posterior axillary line above the approximately 8th rib. Under an apneic spell, the chest cavity was entered safely. A trocar was introduced. The pleuroscope was introduced. We did multiple biopsies with the forceps and also with standard biopsy graspers. Once we had enough of the tissue for pathology, we then removed the scope, placed a red rubber catheter in the chest and with a Valsalva maneuver, placed a figure-of-eight around the red rubber catheter and sucked the remainder pneumothorax in place as we tied the suture down. A 3-0 Vicryl was used to close the skin. Steri-Strips and sterile dressings  were applied. The patient tolerated the procedure well. At the end of the procedure, the sponge, instrument and needle counts were correct. EBL was minimal    Therapeutic suctioning: 15-20min of operative time was spent clearing out the airway of debris, blood and mucous prior to the intervention.     Any disposable equipment was visually inspected and deemed to be intact immediately post procedure.      Relevant Pictures            Recommendations:   Cytology from pleural fluid positive for adenocarcinoma of suspected lung origin. Pleuroscopy performed for additional tissue collection for cytopath and genetics . Pleural effusion drained .     -->  Further management per referring provider   -->  Will discuss tunneled pleural catheter placement     MD Sandra Ruvalcaba  Interventional Pulmonology Surgery Scheduler  Office: [phone]  Email: [email or web address]

## 2022-01-26 NOTE — DISCHARGE INSTRUCTIONS
Post-Bronchoscopy Patient Instructions:    2022  Connor S Idania      Your procedure (pleuroscopy with biopsy ) was completed without any immediate complications.      Our office (Pulmonary--[phone])      You  resume your regular diet as it was prior to procedure.      Should you have any question, please do not hesitate to call our office.  [facility], [place]  Same-Day Surgery   Adult Discharge Orders & Instructions       *Take it easy when you get home.  Remember, same day surgery DOES NOT MEAN SAME DAY RECOVERY!    *Healing is a gradual process and you will need some time to recover - you may be more tired than you realize at first.    *Rest and relax for at least the first 24 hours at home.  You'll feel better and heal faster if you take good care of yourself.    For 24 hours after surgery    A responsible adult must stay with you for at least 24 hours after you leave the hospital.   Do not drink alcohol, drive, or use heavy equipment for 24 hours. This is because you have had anesthesia medications.  Avoid strenuous and risky activities for 24 hours.   You may feel lightheaded, if so, sit for a few minutes before standing.  You may need someone to help you get up. Ask for help when climbing stairs.  If you have nausea: Drink only clear liquids such as water, juice, soda, or broth. Rest may also help. Be sure to drink enough fluids. Move to a  regular diet as you feel able.  You may have a slight fever. Call the doctor if your fever is over 100 F (37.7 C) (taken under the tongue) or lasts longer than 24 hours.  You might have a dry mouth, sore throat, muscle aches or trouble sleeping.  These should go away after 24 hours.  Do not make important or legal decisions.     Call your doctor for any of the followin.  Signs of infection: fever, growing tenderness at the surgery site, a large amount of drainage or bleeding, severe pain, foul-smelling drainage, redness,  "swelling. Please call if you experience any of these symptoms.    2. If it has been 8 to 10 hours since surgery and you are still not able to urinate (pass water), please call.    3.  If you have a headache for over 24 hours, please call.    To contact a doctor, call Dr. Dallin Agrawal @ the clinic at [phone] or:    '   [phone] and ask for \"pulmonary resident \" (this is the hospital and is answered 24 hours a day)    '   Emergency Department: [facility]: [phone]       (TTY for hearing impaired: [phone])      "

## 2022-01-26 NOTE — ANESTHESIA CARE TRANSFER NOTE
Patient: Connor Emerson    Procedure: Procedure(s):  Right BRONCHOSCOPY, FIBEROPTIC, endobronchial ultrasound, pleural biopsy  Pleuroscopy with Pleural Biopsy       Diagnosis: Mediastinal lymphadenopathy [R59.0]  Diagnosis Additional Information: No value filed.    Anesthesia Type:   General     Note:    Oropharynx: oropharynx clear of all foreign objects and spontaneously breathing  Level of Consciousness: drowsy  Oxygen Supplementation: face mask  Level of Supplemental Oxygen (L/min / FiO2): 6  Independent Airway: airway patency satisfactory and stable  Dentition: dentition unchanged  Vital Signs Stable: post-procedure vital signs reviewed and stable  Report to RN Given: handoff report given  Patient transferred to: PACU    Handoff Report: Identifed the Patient, Identified the Reponsible Provider, Reviewed the pertinent medical history, Discussed the surgical course, Reviewed Intra-OP anesthesia mangement and issues during anesthesia, Set expectations for post-procedure period and Allowed opportunity for questions and acknowledgement of understanding      Vitals:  Vitals Value Taken Time   BP     Temp     Pulse 108 01/26/22 1003   Resp     SpO2 90 % 01/26/22 1003   Vitals shown include unvalidated device data.    Electronically Signed By: JERRY Salter CRNA  January 26, 2022  10:04 AM

## 2022-01-26 NOTE — OR NURSING
Dr Shook notified of recheck blood sugar. Wants 5 unit(s) of regular insulin IV and sent do phase 2. Should be rechecked before discharge.

## 2022-01-27 ENCOUNTER — TELEPHONE (OUTPATIENT)
Dept: PULMONOLOGY | Facility: CLINIC | Age: 42
End: 2022-01-27

## 2022-01-27 DIAGNOSIS — G89.18 CHEST WALL PAIN FOLLOWING SURGERY: Primary | ICD-10-CM

## 2022-01-27 DIAGNOSIS — R07.89 CHEST WALL PAIN FOLLOWING SURGERY: Primary | ICD-10-CM

## 2022-01-27 DIAGNOSIS — J90 PLEURAL EFFUSION ON RIGHT: ICD-10-CM

## 2022-01-27 LAB
PATH REPORT.COMMENTS IMP SPEC: ABNORMAL
PATH REPORT.COMMENTS IMP SPEC: ABNORMAL
PATH REPORT.COMMENTS IMP SPEC: YES
PATH REPORT.COMMENTS IMP SPEC: YES
PATH REPORT.FINAL DX SPEC: ABNORMAL
PATH REPORT.GROSS SPEC: ABNORMAL
PATH REPORT.MICROSCOPIC SPEC OTHER STN: ABNORMAL
PATH REPORT.RELEVANT HX SPEC: ABNORMAL

## 2022-01-27 RX ORDER — OXYCODONE HYDROCHLORIDE 5 MG/1
5 TABLET ORAL EVERY 6 HOURS PRN
Qty: 12 TABLET | Refills: 0 | Status: SHIPPED | OUTPATIENT
Start: 2022-01-27 | End: 2022-01-30

## 2022-01-27 NOTE — TELEPHONE ENCOUNTER
Pt's wife is calling in to report that pt was supposed to get his Augmentin refilled after his procedure.   Procedure was right bronchoscopy, fiberoptic, endobronchial US and pelural biopsy. Pleuroscopy.  They went to the [facility] to  the prescription yesterday, and pharmacist told them they didn't get one sent to them.    Last refill for Augmentin was on 1/22. Pt took last antibiotic yesterday. Family thought that pt was supposed to continue Augmentin.    Candance pt's wife is wondering if he could have something stronger than tylenol for pain. Pt is taking two tabs every 4 hours and it is not covering his pain. Pt has pain in right side and is shaking in pain. Was not sent home with pain medication.    Paged Loly Han at 1445  Per Loly, she will order 12 tabs of oxy, take q 6 hours. Will discuss side effect of med: constipation with pt's wife.  Does not need refill of Augmentin  PET scan will be Tuesday and apt w/Dr. Brewer on Wednesday.     Verified pt's preferred pharmacy address.    Called pt's wife and gave above information regarding medications and appointments. Has miralax and encouraged to increase fluids to prevent constipation. Pt will call back if any further questions. Verbalized understanding of all of the above today.    Routed to Loly and RNCC

## 2022-01-31 LAB
INTERPRETATION: NORMAL
LAB DIRECTOR COMMENTS: ABNORMAL
LAB DIRECTOR DISCLAIMER: ABNORMAL
LAB DIRECTOR INTERPRETATION: ABNORMAL
LAB DIRECTOR METHODOLOGY: ABNORMAL
LAB DIRECTOR RESULTS: ABNORMAL
SIGNIFICANT RESULTS: NORMAL
SPECIMEN DESCRIPTION: ABNORMAL
SPECIMEN DESCRIPTION: NORMAL
TEST DETAILS, MDL: NORMAL

## 2022-02-01 ENCOUNTER — HOSPITAL ENCOUNTER (OUTPATIENT)
Dept: PET IMAGING | Facility: CLINIC | Age: 42
Discharge: HOME | End: 2022-02-01
Attending: CLINICAL NURSE SPECIALIST | Admitting: CLINICAL NURSE SPECIALIST
Payer: MEDICAID

## 2022-02-01 DIAGNOSIS — R59.0 MEDIASTINAL LYMPHADENOPATHY: ICD-10-CM

## 2022-02-01 DIAGNOSIS — C34.90 NON-SMALL CELL LUNG CANCER, UNSPECIFIED LATERALITY (H): ICD-10-CM

## 2022-02-01 LAB
PATH REPORT.COMMENTS IMP SPEC: ABNORMAL
PATH REPORT.COMMENTS IMP SPEC: YES
PATH REPORT.FINAL DX SPEC: ABNORMAL
PATH REPORT.GROSS SPEC: ABNORMAL
PATH REPORT.MICROSCOPIC SPEC OTHER STN: ABNORMAL
PATH REPORT.RELEVANT HX SPEC: ABNORMAL
PHOTO IMAGE: ABNORMAL

## 2022-02-01 PROCEDURE — A9552 F18 FDG: HCPCS

## 2022-02-01 PROCEDURE — 250N000011 HC RX IP 250 OP 636

## 2022-02-01 PROCEDURE — 81445 SO NEO GSAP 5-50DNA/DNA&RNA: CPT | Performed by: INTERNAL MEDICINE

## 2022-02-01 PROCEDURE — 78816 PET IMAGE W/CT FULL BODY: CPT | Mod: 26 | Performed by: RADIOLOGY

## 2022-02-01 PROCEDURE — 74177 CT ABD & PELVIS W/CONTRAST: CPT | Mod: 26 | Performed by: RADIOLOGY

## 2022-02-01 PROCEDURE — 343N000001 HC RX 343

## 2022-02-01 PROCEDURE — 71260 CT THORAX DX C+: CPT | Mod: 26 | Performed by: RADIOLOGY

## 2022-02-01 PROCEDURE — G0452 MOLECULAR PATHOLOGY INTERPR: HCPCS | Mod: 26 | Performed by: PATHOLOGY

## 2022-02-01 PROCEDURE — 74177 CT ABD & PELVIS W/CONTRAST: CPT | Mod: 59,PI

## 2022-02-01 RX ORDER — IOPAMIDOL 755 MG/ML
135 INJECTION, SOLUTION INTRAVASCULAR ONCE
Status: COMPLETED | OUTPATIENT
Start: 2022-02-01 | End: 2022-02-01

## 2022-02-01 RX ADMIN — IOPAMIDOL 135 ML: 755 INJECTION, SOLUTION INTRAVENOUS at 14:46

## 2022-02-01 RX ADMIN — FLUDEOXYGLUCOSE F-18 13.52 MCI.: 500 INJECTION, SOLUTION INTRAVENOUS at 14:46

## 2022-02-02 ENCOUNTER — PREP FOR PROCEDURE (OUTPATIENT)
Dept: PULMONOLOGY | Facility: CLINIC | Age: 42
End: 2022-02-02

## 2022-02-02 ENCOUNTER — ONCOLOGY VISIT (OUTPATIENT)
Dept: ONCOLOGY | Facility: CLINIC | Age: 42
End: 2022-02-02
Attending: CLINICAL NURSE SPECIALIST
Payer: MEDICAID

## 2022-02-02 ENCOUNTER — ANCILLARY PROCEDURE (OUTPATIENT)
Dept: GENERAL RADIOLOGY | Facility: CLINIC | Age: 42
End: 2022-02-02
Attending: STUDENT IN AN ORGANIZED HEALTH CARE EDUCATION/TRAINING PROGRAM
Payer: MEDICAID

## 2022-02-02 VITALS
WEIGHT: 222 LBS | TEMPERATURE: 98.3 F | OXYGEN SATURATION: 100 % | RESPIRATION RATE: 18 BRPM | SYSTOLIC BLOOD PRESSURE: 175 MMHG | HEART RATE: 91 BPM | DIASTOLIC BLOOD PRESSURE: 109 MMHG | BODY MASS INDEX: 32.78 KG/M2

## 2022-02-02 DIAGNOSIS — J90 PLEURAL EFFUSION: Primary | ICD-10-CM

## 2022-02-02 DIAGNOSIS — C34.31 MALIGNANT NEOPLASM OF LOWER LOBE OF RIGHT LUNG (H): ICD-10-CM

## 2022-02-02 DIAGNOSIS — C34.90 NON-SMALL CELL LUNG CANCER, UNSPECIFIED LATERALITY (H): ICD-10-CM

## 2022-02-02 DIAGNOSIS — R59.0 MEDIASTINAL LYMPHADENOPATHY: ICD-10-CM

## 2022-02-02 DIAGNOSIS — C79.31 BRAIN METASTASIS: Primary | ICD-10-CM

## 2022-02-02 LAB — RADIOLOGIST FLAGS: NORMAL

## 2022-02-02 PROCEDURE — G0463 HOSPITAL OUTPT CLINIC VISIT: HCPCS

## 2022-02-02 PROCEDURE — 71046 X-RAY EXAM CHEST 2 VIEWS: CPT | Mod: GC | Performed by: RADIOLOGY

## 2022-02-02 PROCEDURE — 36415 COLL VENOUS BLD VENIPUNCTURE: CPT | Performed by: STUDENT IN AN ORGANIZED HEALTH CARE EDUCATION/TRAINING PROGRAM

## 2022-02-02 PROCEDURE — 99205 OFFICE O/P NEW HI 60 MIN: CPT | Performed by: STUDENT IN AN ORGANIZED HEALTH CARE EDUCATION/TRAINING PROGRAM

## 2022-02-02 RX ORDER — DEXAMETHASONE 4 MG/1
4 TABLET ORAL 2 TIMES DAILY WITH MEALS
Qty: 14 TABLET | Refills: 0 | Status: SHIPPED | OUTPATIENT
Start: 2022-02-02 | End: 2022-02-15

## 2022-02-02 RX ORDER — DEXAMETHASONE 4 MG/1
4 TABLET ORAL 2 TIMES DAILY WITH MEALS
Qty: 14 TABLET | Refills: 0 | Status: SHIPPED | OUTPATIENT
Start: 2022-02-02 | End: 2022-02-02

## 2022-02-02 ASSESSMENT — PAIN SCALES - GENERAL: PAINLEVEL: NO PAIN (0)

## 2022-02-02 NOTE — NURSING NOTE
I drew labs via venipuncture on this pt while in clinic. They tolerated this well.   Labs sent down to the first floor labs.    Luisa Mclain MA

## 2022-02-02 NOTE — LETTER
2/2/2022         RE: Connor Emerson  [address]  [place] [address]        Dear Colleague,    Thank you for referring your patient, Connor Emerson, to the [facility]. Please see a copy of my visit note below.    MEDICAL ONCOLOGY INITIAL CONSULT NOTE    PATIENT NAME: Connor Emerson  ENCOUNTER DATE: 2/2/2022    Care Team  Primary Oncologist: Bassam Persaud MD    REASON FOR CURRENT VISIT: New diagnosis of lung cancer    HISTORY OF PRESENT ILLNESS:  Mr. Connor Emerson is a 41 year old  male who is a non-smoker with PMHx of T2DM, HTN with newly diagnosed metastatic NSCLC comes for evaluation    Oncologic Hx:    Diagnosis:   Stage IV NSCLC, Rt lung adenocarcinoma with metastasis to pleura, mediastinum , rt pleural effusion and ?brain diagnosed 1/2022 (AJCC 8th edition)  PD-L1 TPS 2-3% by [employer]   NGS [facility] panel-pending    Treatment: TBD    Intent of treatment: Palliative    Oncologic course:  1/19/22 to 1/22/22-Admitted to [facility] for 2 week progressive SOB secondary to have large rt sided pleural effusion, needing thoracentesis x2 (1.7L and 2.0 L removed), cytology positive for malignancy, adenocarcinoma.   1/26/22- Rt pleural mass biopsy-Dr. Agrawal--POSITIVE FOR ADENOCARCINOMA CONSISTENT WITH LUNG PRIMARY, admixed with mesothelial hyperplasia and inflammatory infiltrate (+ TTF-1 and CK 7;  negative  p40, calretinin and WT-1. PAX8 immunostain focal +). 4th thoracentesis done simultaneously - 3L approx removed.   2/1/22- PET/CT-Right lower lobe central infiltrative FDG avid 8.2 x 9.6 cm mass representing a primary lung adenocarcinoma. Ipsilateral right perihilar, bilateral pretracheal, subcarinal and superior mediastinal michele metastases. Contralateral mildly FDG avid few lung nodules are suspicious for contralateral metastasis. At least 3 intracranial metastases in the right frontal lobe, left frontal lobe and left cerebellar hemisphere. Nonspecific mild diffuse bone marrow  uptake. Further evaluation with a spine MRI could be considered to rule out early marrow infiltration. This could also be seen with red marrow conversion.      Interval Hx:  Patient is here today to establish care, accompanied by his wife Kylie. His father with was also on speaker phone during this encounter.     Patient reports significant anxiety given his recent diagnosis. He reports that his dyspnea has improved since his thoracentesis on 01/26/2022, but still present, especially on lying down or on the left side. Some persistent pain at thoracentesis site, but slowly improving no discharge from site. Has cough but has been chronic for the last 2 to 3 months, nonexpectorant but has noticed some very small blood streaks recently. Also reports mild night sweats for the last 3 days. Notably has headaches over the last week,  he states that it is not affecting his daily activities or sleep. Is not present when he wakes up. Denies any double vision or other visual changes, speech abnormalities, facial weakness or deviation, dysphagia or odynophagia, weakness of his limbs or gait instability. Denies any abdominal pain. States that his appetite is excellent and is actually eating much more than he used to. Bowel movements are normal. No urinary complaints. No bone pain elsewhere.    REVIEW OF SYSTEMS: 14 point ROS negative other than the symptoms noted above in the HPI.    Wt Readings from Last 4 Encounters:   01/26/22 103.2 kg (227 lb 8.2 oz)   01/25/22 103.6 kg (228 lb 4.8 oz)   01/22/22 101.7 kg (224 lb 4.8 oz)   05/12/20 108.9 kg (240 lb)          Review of Systems:  A comprehensive ROS was performed and found to be negative or non-contributory with the exception of that noted in the HPI above.    Past Medical History:  GERD  Hypertension, not on medication  Type 2 diabetes mellitus, not on medications currently, previously on Metformin        Past Surgical History:  Past Surgical History:   Procedure Laterality  Date     BRONCHOSCOPY RIGID OR FLEXIBLE W/TRANSENDOSCOPIC ENDOBRONCHIAL ULTRASOUND GUIDED Bilateral 2022    Procedure: Right BRONCHOSCOPY, FIBEROPTIC, endobronchial ultrasound, pleural biopsy;  Surgeon: Dallin Agrawal MD;  Location:  OR     INJECT BLOCK MEDIAL BRANCH CERVICAL/THORACIC/LUMBAR       ORTHOPEDIC SURGERY      Ganesh. Rotator cuff repair.     PLEUROSCOPY N/A 2022    Procedure: Pleuroscopy with Pleural Biopsy;  Surgeon: Dallin Agrawal MD;  Location:  OR       Social History:  Lives with wife and 4 kids in [place]. Works as a  for an [employer] complex in [place]. Exposure to household chemicals and . No significant exposure to asbestos. No signal exposure to benzene or similar chemicals. No significant smoking history-states that he smoked 1 to 2 cigarettes occasionally per month for about 2 years in college, non-smoking since then. No significant alcohol use history. No other recreational substances. Good support system. Kids are 23, 19, 17 and 13.    Family History  Significant history for cancers on maternal side. Mother  of uterine cancer. 2 maternal uncles have possible metastatic melanoma.    Outpatient Medications:  Not currently taking any outpatient medications. Has been prescribed Metformin in the past.    Physical Exam:    Blood pressure (!) 175/109, pulse 91, temperature 98.3  F (36.8  C), temperature source Oral, resp. rate 18, weight 100.7 kg (222 lb), SpO2 100 %.     General: alert and cooperative, sitting up in chair, appears very anxious.   HEENT: sclera anicteric, EOMI, MMM. Pupils equal and reactive.   Neck: supple, normal ROM, no lymph nodes palpable.   CV: RRR, no murmurs  Resp: No evident resp distress, no use of accessory muscles of respiration. Air entry decreased in right lower lung field, dullness to percussion, no rhonchi or crepitus. Right posterior thorax with dressing for thoracentesis - no fluid or fluctuance around site,  dressing clean.   GI: soft, non-tender, non-distended, bowel sounds present and normoactive  MSK: warm and well-perfused, normal tone  Skin: no rashes on limited exam, no jaundice  Neuro: Alert and interactive, higher mental functions intact - judgement, memory, executive functioning. No evident gross cranial nerve deficits 3-6, 7, 8,9, 10, 11 and 12. No focal motor weakness or sensory abnormalities. Cerebellar function was assessed and found to be normal - no dysmetria, normal finger-nose, heel-knee test, no dysdiadochokinesia. Muscle tone is normal in all extremities. Gait observed and found to be normal.     Labs & Studies: I personally reviewed the following studies:    Recent Labs   Lab Test 01/20/22  0609 01/19/22 2006 04/27/17  1013 08/20/16  1950 06/01/16  1417   WBC 7.8 10.1 9.3 12.1* 10.2   RBC 4.67 5.36 4.88 5.25 5.07   HGB 14.0 15.9 15.2 16.4 15.7   HCT 43.4 49.4 43.8 47.3 45.8   MCV 93 92 90 90 90   MCH 30.0 29.7 31.1 31.2 31.0   MCHC 32.3 32.2 34.7 34.7 34.3   RDW 12.1 12.1 12.9 12.9 13.0    381 187 245 256   NEUTROPHIL  --  73  --  64.5 70.3     Recent Labs   Lab Test 01/26/22  1147 01/26/22  1110 01/26/22  1044 01/21/22  0758 01/21/22  0622 01/20/22  1309 01/20/22  0609 01/20/22  0041 01/19/22 2006   NA  --   --   --   --  140  --  141  --  137   POTASSIUM  --   --   --   --  3.7  --  3.8  --  4.0   CHLORIDE  --   --   --   --  108  --  109  --  104   CO2  --   --   --   --  28  --  29  --  30   ANIONGAP  --   --   --   --  4  --  3  --  3   * 256* 238*   < > 236*   < > 239*   < > 399*   BUN  --   --   --   --  12  --  12  --  14   CR  --   --   --   --  0.52*  --  0.52*  --  0.56*   TORY  --   --   --   --  8.6  --  8.2*  --  9.1    < > = values in this interval not displayed.     Recent Labs   Lab Test 01/20/22  0609 01/19/22  2006 08/10/18  1110   PROTTOTAL 5.7* 7.0  7.2 6.8   ALBUMIN 2.2* 3.0* 4.1   BILITOTAL 0.3 0.5 0.7   ALKPHOS 69 87 79   AST 8 12 19   ALT 18 24 28         PET  -CT (22):                                              IMPRESSION: In this patient with newly diagnosed adenocarcinoma of the  lun. Right lower lobe central infiltrative FDG avid 8.2 x 9.6 cm mass  representing a primary lung adenocarcinoma.   2. Ipsilateral right perihilar, bilateral pretracheal, subcarinal and  superior mediastinal michele metastases.  3. Contralateral mildly FDG avid few coronary nodules are suspicious  for contralateral metastasis.  4. At least 3 intracranial metastases in the right frontal lobe, left  frontal lobe and left cerebellar hemisphere. Brain MRI is recommended  to further evaluate.   5. Nonspecific mild diffuse bone marrow uptake. Further evaluation  with a spine MRI could be considered to rule out early marrow  infiltration. This could also be seen with red marrow conversion.          ASSESSMENT AND PLAN:  Connor Corrales is a 41-year-old male non-smoker with a past medical history of hypertension and diabetes who presented with several weeks of dyspnea and found to have right-sided pleural effusion and a large right lung hilar mass, status post bronchoscopy and EBUS on 2022, with tissue sampling demonstrating adenocarcinoma on histopathological analysis. Mr. Emerson is here to establish care with medical oncology.    # Stage IV Non-Small Cell Lung Carcinoma, with right pleural involvement, mediastinal and hilar lymphadenopathy, left lung involvement (nodule) and multiple brain metastases  # Malignant Pleural Effusion, Right  # Brain Metastases  Patient appears to have a primary right lower lobe lung primary with extensive pleural involvement (recurrent pleural effusion), solitary hypermetabolic left lung nodule (on PET, concerning for met), hilar and mediastinal LN, and multiple brain mets). No evident disease in bone or abdominal organs at this time.  Patient primary symptom is dyspnea, minimal pain, headache but no other neurological symptoms/signs or other distant  mets    We had an extensive discussion with Connor, his wife Kylie and his father Tommie (who was on speakerphone) regarding patient's new diagnosis of cancer. We reviewed his current staging (Stage IV) and our current knowledge of extent of spread. We also reviewed that the intent of treatment in this setting would be palliative (ie to control disease, prolong survival and improve QoL) rather than curative, and this would involve systemic treatment rather than surgical. We discussed the pending genetic testing (NGS) that would have a significant impact on plan for systemic treatment. Given his young age and non-smoking status with adenocarcinoma, we would anticipate that he possibly has a  mutation. While we await results, we will continue best supportive care to assist with his symptoms related to his primary tumor and his metastases. Given the fact her has required 4x thoracentesis over last 6 weeks, we will request for a pleurex catheter for drainage of effusion rather than repeat thoracentesis - he is agreeable with this. His brain mets do not seem to be causing significant symptoms - does not require hospitalization or iv steroids, will start low-dose dex for now. Will place referral to Rad Onc for definitive treatment of same as well.     Overall plan  - po dexamethasone for brain lesions (possible vasogenic edema given headaches) low-dose 4mg qdaily for now.   -CXR today with stable rt pleural effusion  - referral to Rad Onc for potential radiation options for brain mets  - complete MRI Brain on 2/5/22 (already scheduled)   - Await  PDL1 testing  - Await NGS on lung tumor specimen (1/26/22)  - Await [facility] drawn today (2/2/22)   - RTC in 2 weeks to assess above results and plan systemic treatment.     # Brain mets: PET/CT showing possible 3 brain metastasis.  He is scheduled for an MRI this Saturday.  He is remains mostly asymptomatic except for mild headaches in the last 2 to  3 days.  --Radiation oncology consult once MRI is done for possible gamma knife  --We will start low-dose dexamethasone 4 mg daily until the MRI      # Pleural effusion: Patient with recurrent pleural effusion on the right side and as needed thoracentesis 3 times in the last 3 weeks.  The last 1 was last Wednesday when 2 L was removed.    -We will speak to interventional pulmonology team for Pleurx placement    #Bone health  No bone mets.     # Cancer related pain  Minimal. No new analgesics. Pain at site of thoracentesis - tylenol PRN.     #Nutrition  Good nutritional status thus far. Counseled on appropriate diet in cancer - balanced diet, with plenty of fresh fruits, vegetables, protein, less processed carbohydrates. But also counseled on avoiding fad diets during this time.     #Psychiatry  # Situational Anxiety   - Significant Situational anxiety related to his new diagnosis of cancer. Discussed possible initiation of anti-anxiety medications - Connor declined for now, will attempt to cope. Will let us know if he feels like he needs meds for this.     #COVID vaccine: No COVID vaccine on record, did not ask today, will confirm      #Type 2 diabetes mellitus.  Was previously on Metformin, but not taking anything right now    #History of hypertension: Not on any medication.  Blood pressure today is elevated, but he also has a lot of associated anxiety.  Asked him to monitor blood pressure at home, would consider adding antihypertensive next time.    On the day of service,  Preparation time:  10 minutes  Visit time:  50 minutes  Care Coordination:  10 minutes          Again, thank you for allowing me to participate in the care of your patient.      Sincerely,    Bassam Persaud MD

## 2022-02-02 NOTE — NURSING NOTE
"Oncology Rooming Note    February 2, 2022 12:49 PM   Connor Emerson is a 41 year old male who presents for:    Chief Complaint   Patient presents with     Oncology Clinic Visit     Pt is here for a New Eval for Mediastinal Lymphadenopathy     Initial Vitals: Blood Pressure (Abnormal) 175/109   Pulse 91   Temperature 98.3  F (36.8  C) (Oral)   Respiration 18   Weight 100.7 kg (222 lb)   Oxygen Saturation 100%   Body Mass Index 32.78 kg/m   Estimated body mass index is 32.78 kg/m  as calculated from the following:    Height as of 1/26/22: 1.753 m (5' 9\").    Weight as of this encounter: 100.7 kg (222 lb). Body surface area is 2.21 meters squared.  No Pain (0) Comment: Data Unavailable   No LMP for male patient.  Allergies reviewed: Yes  Medications reviewed: Yes    Medications: Medication refills not needed today.  Pharmacy name entered into EPIC:    [facility] - A MAIL ORDER [facility] - [id number]    Clinical concerns: none       Luisa Mclain MA            "

## 2022-02-02 NOTE — PROGRESS NOTES
MEDICAL ONCOLOGY INITIAL CONSULT NOTE    PATIENT NAME: Connor Emerson  ENCOUNTER DATE: 2/2/2022    Care Team  Primary Oncologist: Bassam Persaud MD    REASON FOR CURRENT VISIT: New diagnosis of lung cancer    HISTORY OF PRESENT ILLNESS:  Mr. Connor Emerson is a 41 year old  male who is a non-smoker with PMHx of T2DM, HTN with newly diagnosed metastatic NSCLC comes for evaluation    Oncologic Hx:    Diagnosis:   Stage IV NSCLC, Rt lung adenocarcinoma with metastasis to pleura, mediastinum , rt pleural effusion and ?brain diagnosed 1/2022 (AJCC 8th edition)  PD-L1 TPS 2-3% by [place]   NGS [facility] panel-pending    Treatment: TBD    Intent of treatment: Palliative    Oncologic course:  1/19/22 to 1/22/22-Admitted to [facility] for 2 week progressive SOB secondary to have large rt sided pleural effusion, needing thoracentesis x2 (1.7L and 2.0 L removed), cytology positive for malignancy, adenocarcinoma.   1/26/22- Rt pleural mass biopsy-Dr. Agrawal--POSITIVE FOR ADENOCARCINOMA CONSISTENT WITH LUNG PRIMARY, admixed with mesothelial hyperplasia and inflammatory infiltrate (+ TTF-1 and CK 7;  negative  p40, calretinin and WT-1. PAX8 immunostain focal +). 4th thoracentesis done simultaneously - 3L approx removed.   2/1/22- PET/CT-Right lower lobe central infiltrative FDG avid 8.2 x 9.6 cm mass representing a primary lung adenocarcinoma. Ipsilateral right perihilar, bilateral pretracheal, subcarinal and superior mediastinal michele metastases. Contralateral mildly FDG avid few lung nodules are suspicious for contralateral metastasis. At least 3 intracranial metastases in the right frontal lobe, left frontal lobe and left cerebellar hemisphere. Nonspecific mild diffuse bone marrow uptake. Further evaluation with a spine MRI could be considered to rule out early marrow infiltration. This could also be seen with red marrow conversion.      Interval Hx:  Patient is here today to establish care, accompanied by his wife Kylie. His father  with was also on speaker phone during this encounter.     Patient reports significant anxiety given his recent diagnosis. He reports that his dyspnea has improved since his thoracentesis on 01/26/2022, but still present, especially on lying down or on the left side. Some persistent pain at thoracentesis site, but slowly improving no discharge from site. Has cough but has been chronic for the last 2 to 3 months, nonexpectorant but has noticed some very small blood streaks recently. Also reports mild night sweats for the last 3 days. Notably has headaches over the last week,  he states that it is not affecting his daily activities or sleep. Is not present when he wakes up. Denies any double vision or other visual changes, speech abnormalities, facial weakness or deviation, dysphagia or odynophagia, weakness of his limbs or gait instability. Denies any abdominal pain. States that his appetite is excellent and is actually eating much more than he used to. Bowel movements are normal. No urinary complaints. No bone pain elsewhere.    REVIEW OF SYSTEMS: 14 point ROS negative other than the symptoms noted above in the HPI.    Wt Readings from Last 4 Encounters:   01/26/22 103.2 kg (227 lb 8.2 oz)   01/25/22 103.6 kg (228 lb 4.8 oz)   01/22/22 101.7 kg (224 lb 4.8 oz)   05/12/20 108.9 kg (240 lb)          Review of Systems:  A comprehensive ROS was performed and found to be negative or non-contributory with the exception of that noted in the HPI above.    Past Medical History:  GERD  Hypertension, not on medication  Type 2 diabetes mellitus, not on medications currently, previously on Metformin        Past Surgical History:  Past Surgical History:   Procedure Laterality Date     BRONCHOSCOPY RIGID OR FLEXIBLE W/TRANSENDOSCOPIC ENDOBRONCHIAL ULTRASOUND GUIDED Bilateral 1/26/2022    Procedure: Right BRONCHOSCOPY, FIBEROPTIC, endobronchial ultrasound, pleural biopsy;  Surgeon: Dallin Agrawal MD;  Location: [facility] OR     [facility]  BLOCK MEDIAL BRANCH CERVICAL/THORACIC/LUMBAR       ORTHOPEDIC SURGERY      Ganesh. Rotator cuff repair.     PLEUROSCOPY N/A 2022    Procedure: Pleuroscopy with Pleural Biopsy;  Surgeon: Dallin Agrawal MD;  Location:  OR       Social History:  Lives with wife and 4 kids in [place]. Works as a  for an apartment complex in [place]. Exposure to household chemicals and . No significant exposure to asbestos. No signal exposure to benzene or similar chemicals. No significant smoking history-states that he smoked 1 to 2 cigarettes occasionally per month for about 2 years in college, non-smoking since then. No significant alcohol use history. No other recreational substances. Good support system. Kids are 23, 19, 17 and 13.    Family History  Significant history for cancers on maternal side. Mother  of uterine cancer. 2 maternal uncles have possible metastatic melanoma.    Outpatient Medications:  Not currently taking any outpatient medications. Has been prescribed Metformin in the past.    Physical Exam:    Blood pressure (!) 175/109, pulse 91, temperature 98.3  F (36.8  C), temperature source Oral, resp. rate 18, weight 100.7 kg (222 lb), SpO2 100 %.     General: alert and cooperative, sitting up in chair, appears very anxious.   HEENT: sclera anicteric, EOMI, MMM. Pupils equal and reactive.   Neck: supple, normal ROM, no lymph nodes palpable.   CV: RRR, no murmurs  Resp: No evident resp distress, no use of accessory muscles of respiration. Air entry decreased in right lower lung field, dullness to percussion, no rhonchi or crepitus. Right posterior thorax with dressing for thoracentesis - no fluid or fluctuance around site, dressing clean.   GI: soft, non-tender, non-distended, bowel sounds present and normoactive  MSK: warm and well-perfused, normal tone  Skin: no rashes on limited exam, no jaundice  Neuro: Alert and interactive, higher mental functions intact - judgement,  memory, executive functioning. No evident gross cranial nerve deficits 3-6, 7, 8,9, 10, 11 and 12. No focal motor weakness or sensory abnormalities. Cerebellar function was assessed and found to be normal - no dysmetria, normal finger-nose, heel-knee test, no dysdiadochokinesia. Muscle tone is normal in all extremities. Gait observed and found to be normal.     Labs & Studies: I personally reviewed the following studies:    Recent Labs   Lab Test 22  0609 22  1013 16  1950 16  1417   WBC 7.8 10.1 9.3 12.1* 10.2   RBC 4.67 5.36 4.88 5.25 5.07   HGB 14.0 15.9 15.2 16.4 15.7   HCT 43.4 49.4 43.8 47.3 45.8   MCV 93 92 90 90 90   MCH 30.0 29.7 31.1 31.2 31.0   MCHC 32.3 32.2 34.7 34.7 34.3   RDW 12.1 12.1 12.9 12.9 13.0    381 187 245 256   NEUTROPHIL  --  73  --  64.5 70.3     Recent Labs   Lab Test 22  1147 22  1110 22  1044 22  0758 22  0622 22  1309 22  0609 22  0041 22   NA  --   --   --   --  140  --  141  --  137   POTASSIUM  --   --   --   --  3.7  --  3.8  --  4.0   CHLORIDE  --   --   --   --  108  --  109  --  104   CO2  --   --   --   --  28  --  29  --  30   ANIONGAP  --   --   --   --  4  --  3  --  3   * 256* 238*   < > 236*   < > 239*   < > 399*   BUN  --   --   --   --  12  --  12  --  14   CR  --   --   --   --  0.52*  --  0.52*  --  0.56*   TORY  --   --   --   --  8.6  --  8.2*  --  9.1    < > = values in this interval not displayed.     Recent Labs   Lab Test 22  0609 01/19/22  2006 08/10/18  1110   PROTTOTAL 5.7* 7.0  7.2 6.8   ALBUMIN 2.2* 3.0* 4.1   BILITOTAL 0.3 0.5 0.7   ALKPHOS 69 87 79   AST 8 12 19   ALT 18 24 28         PET -CT (22):                                              IMPRESSION: In this patient with newly diagnosed adenocarcinoma of the  lun. Right lower lobe central infiltrative FDG avid 8.2 x 9.6 cm mass  representing a primary lung adenocarcinoma.   2.  Ipsilateral right perihilar, bilateral pretracheal, subcarinal and  superior mediastinal michele metastases.  3. Contralateral mildly FDG avid few coronary nodules are suspicious  for contralateral metastasis.  4. At least 3 intracranial metastases in the right frontal lobe, left  frontal lobe and left cerebellar hemisphere. Brain MRI is recommended  to further evaluate.   5. Nonspecific mild diffuse bone marrow uptake. Further evaluation  with a spine MRI could be considered to rule out early marrow  infiltration. This could also be seen with red marrow conversion.          ASSESSMENT AND PLAN:  Connor Corrales is a 41-year-old male non-smoker with a past medical history of hypertension and diabetes who presented with several weeks of dyspnea and found to have right-sided pleural effusion and a large right lung hilar mass, status post bronchoscopy and EBUS on 1/26/2022, with tissue sampling demonstrating adenocarcinoma on histopathological analysis. Mr. Emerson is here to establish care with medical oncology.    # Stage IV Non-Small Cell Lung Carcinoma, with right pleural involvement, mediastinal and hilar lymphadenopathy, left lung involvement (nodule) and multiple brain metastases  # Malignant Pleural Effusion, Right  # Brain Metastases  Patient appears to have a primary right lower lobe lung primary with extensive pleural involvement (recurrent pleural effusion), solitary hypermetabolic left lung nodule (on PET, concerning for met), hilar and mediastinal LN, and multiple brain mets). No evident disease in bone or abdominal organs at this time.  Patient primary symptom is dyspnea, minimal pain, headache but no other neurological symptoms/signs or other distant mets    We had an extensive discussion with Connor, his wife Kylie and his father Tommie (who was on speakerphone) regarding patient's new diagnosis of cancer. We reviewed his current staging (Stage IV) and our current knowledge of extent of spread. We also  reviewed that the intent of treatment in this setting would be palliative (ie to control disease, prolong survival and improve QoL) rather than curative, and this would involve systemic treatment rather than surgical. We discussed the pending genetic testing (NGS) that would have a significant impact on plan for systemic treatment. Given his young age and non-smoking status with adenocarcinoma, we would anticipate that he possibly has a  mutation. While we await results, we will continue best supportive care to assist with his symptoms related to his primary tumor and his metastases. Given the fact her has required 4x thoracentesis over last 6 weeks, we will request for a pleurex catheter for drainage of effusion rather than repeat thoracentesis - he is agreeable with this. His brain mets do not seem to be causing significant symptoms - does not require hospitalization or iv steroids, will start low-dose dex for now. Will place referral to Rad Onc for definitive treatment of same as well.     Overall plan  - po dexamethasone for brain lesions (possible vasogenic edema given headaches) low-dose 4mg qdaily for now.   -CXR today with stable rt pleural effusion  - referral to Rad Onc for potential radiation options for brain mets  - complete MRI Brain on 2/5/22 (already scheduled)   - Await  PDL1 testing  - Await NGS on lung tumor specimen (1/26/22)  - Await Liquid Biopsy NGS ([facility]) drawn today (2/2/22)   - RTC in 2 weeks to assess above results and plan systemic treatment.     # Brain mets: PET/CT showing possible 3 brain metastasis.  He is scheduled for an MRI this Saturday.  He is remains mostly asymptomatic except for mild headaches in the last 2 to 3 days.  --Radiation oncology consult once MRI is done for possible gamma knife  --We will start low-dose dexamethasone 4 mg daily until the MRI      # Pleural effusion: Patient with recurrent pleural effusion on the right side and as needed thoracentesis 3  times in the last 3 weeks.  The last 1 was last Wednesday when 2 L was removed.    -We will speak to interventional pulmonology team for Pleurx placement    #Bone health  No bone mets.     # Cancer related pain  Minimal. No new analgesics. Pain at site of thoracentesis - tylenol PRN.     #Nutrition  Good nutritional status thus far. Counseled on appropriate diet in cancer - balanced diet, with plenty of fresh fruits, vegetables, protein, less processed carbohydrates. But also counseled on avoiding fad diets during this time.     #Psychiatry  # Situational Anxiety   - Significant Situational anxiety related to his new diagnosis of cancer. Discussed possible initiation of anti-anxiety medications - Connor declined for now, will attempt to cope. Will let us know if he feels like he needs meds for this.     #COVID vaccine: No COVID vaccine on record, did not ask today, will confirm      #Type 2 diabetes mellitus.  Was previously on Metformin, but not taking anything right now    #History of hypertension: Not on any medication.  Blood pressure today is elevated, but he also has a lot of associated anxiety.  Asked him to monitor blood pressure at home, would consider adding antihypertensive next time.    On the day of service,  Preparation time:  10 minutes  Visit time:  50 minutes  Care Coordination:  10 minutes      Bassam Persaud M.D.   of Medicine  Division of Hematology, Oncology and Transplantation  [facility]

## 2022-02-02 NOTE — LETTER
February 2, 2022    RE:  Connor Emerson                              [address]   [facility] [address]            To whom it may concern:    Connor Emerson is under my professional care. He saw me in the clinicon 2/2/2022.  He may return to work 2/7/22.     Sincerely,        Bassam Persaud MD

## 2022-02-05 ENCOUNTER — HOSPITAL ENCOUNTER (OUTPATIENT)
Dept: MRI IMAGING | Facility: CLINIC | Age: 42
Discharge: HOME | End: 2022-02-05
Attending: CLINICAL NURSE SPECIALIST | Admitting: CLINICAL NURSE SPECIALIST
Payer: MEDICAID

## 2022-02-05 DIAGNOSIS — R59.0 MEDIASTINAL LYMPHADENOPATHY: ICD-10-CM

## 2022-02-05 DIAGNOSIS — C34.90 NON-SMALL CELL LUNG CANCER, UNSPECIFIED LATERALITY (H): ICD-10-CM

## 2022-02-05 PROCEDURE — 255N000002 HC RX 255 OP 636

## 2022-02-05 PROCEDURE — A9585 GADOBUTROL INJECTION: HCPCS

## 2022-02-05 PROCEDURE — 70553 MRI BRAIN STEM W/O & W/DYE: CPT

## 2022-02-05 RX ORDER — GADOBUTROL 604.72 MG/ML
10 INJECTION INTRAVENOUS ONCE
Status: COMPLETED | OUTPATIENT
Start: 2022-02-05 | End: 2022-02-05

## 2022-02-05 RX ADMIN — GADOBUTROL 10 ML: 604.72 INJECTION INTRAVENOUS at 19:20

## 2022-02-09 ENCOUNTER — NURSE TRIAGE (OUTPATIENT)
Dept: ONCOLOGY | Facility: CLINIC | Age: 42
End: 2022-02-09
Payer: MEDICAID

## 2022-02-09 DIAGNOSIS — C34.90 NON-SMALL CELL LUNG CANCER, UNSPECIFIED LATERALITY (H): Primary | ICD-10-CM

## 2022-02-09 DIAGNOSIS — Z11.59 ENCOUNTER FOR SCREENING FOR OTHER VIRAL DISEASES: Primary | ICD-10-CM

## 2022-02-09 DIAGNOSIS — C79.31 BRAIN METASTASIS: ICD-10-CM

## 2022-02-09 LAB — SCANNED LAB RESULT: NORMAL

## 2022-02-09 NOTE — TELEPHONE ENCOUNTER
[facility] Triage    Incoming Call: He has changed his mind on his anxiety meds, he would like some and he would also like something for his hiccups that have been ongoing for 3 days.  Also, he needs to talk to scheduling as he thought he had an appt tomorrow and he doesn't until 2/16.    Anti anxiety med and hiccup med    Per Dr. Persaud's 2/2/22 note: Significant Situational anxiety related to his new diagnosis of cancer. Discussed possible initiation of anti-anxiety medications - Connor declined for now, will attempt to cope. Will let us know if he feels like he needs meds for this.     Routing message to Cori and Jamari Ya as patient would like medication RX for antianxiety and his hiccups.

## 2022-02-10 ENCOUNTER — PRE VISIT (OUTPATIENT)
Dept: RADIATION ONCOLOGY | Facility: CLINIC | Age: 42
End: 2022-02-10
Payer: MEDICAID

## 2022-02-10 DIAGNOSIS — C79.31 METASTASIS TO BRAIN (H): Primary | ICD-10-CM

## 2022-02-10 NOTE — NURSING NOTE
Date: 2/10/2022   Age: 41 year old  Ethnicity:     Sex: male  : 1980   Lives In: [place], [place]      Diagnosis: Non Small Cell Lung Cancer    Prior radiation therapy:   Site Treated: at  Facility: at  Dates: at  Dose: at    Site Treated: at  Facility: at  Dates: at  Dose: at    Prior chemotherapy:   None    RN time with patient:  Educated on Gamma Knife:    Doctors: Dr. Bassam Persaud,     Pain at time of consult:  Does pt have a living will:  Does pt have an implanted cardiac device:    Has pt fallen in past week:  Does pt feel steady on his feet:       Review Since Diagnosis:    Pt having progressive SOB    22 thru 22;  Admitted to , thoracentesis x2 during the admission (1.7 liters and 2.0 liters removed), cytology showed malignancy, adenocarcinoma     22; right pleural mass biopsy, positive for adenocarcinoma consistent with lung primary, did another thoracentesis simultaneously (3 liters)    22; PET/CT, right lower lobe lesion representing primary lung adenocarcinoma, right perihilar, bilateral pretracheal, subcarinal and superior mediastinal michele mets, few lung nodules suspicious for contralateral mets.  At least 3 intracranial mets.  Unspecific mild diffuse marrow uptake.    22; pt saw Dr. Persaud in consult to establish care, pt still SOB with lying down and on left side-but is improved.  Chronic cough for 2-3 months, occasional blood streaked. Night sweats recently.  Occasional headaches last few days.  Are awaiting genetic testing to determine systemic treatment.  Recommended a Pleurex catheter.  Stated would start dexamethasone 4 mg po daily for pt having some headaches.  Referral to Rad/Onc for opinion on radiation for brain mets.  Need Brain MRI    22; Brain MRI, 2.8 x 2.8 cm lesion right frontal                                  2.1 x 2.3 cm lesion left mid frontal                                  0.5 x 0.8 cm lesion right parietal                                   0.3 x 0.4 cm lesion left parietal                                  0.2 cm lesion left frontal                                  0.3 cm lesion left frontal                                  1.5 x 1.9 cm lesion left cerebellum                                  1.0 x 1.0 cm lesion left cerebellum                                  1.1 x 1.7 cm lesion right temporal    No mutations identified in analyzed genes     2/16/22; to have Pleurex catheter placed     Chief Complaint: at consult

## 2022-02-11 DIAGNOSIS — C79.31 METASTASIS TO BRAIN (H): Primary | ICD-10-CM

## 2022-02-14 ENCOUNTER — OFFICE VISIT (OUTPATIENT)
Dept: RADIATION ONCOLOGY | Facility: CLINIC | Age: 42
End: 2022-02-14
Attending: RADIOLOGY
Payer: MEDICAID

## 2022-02-14 VITALS
HEIGHT: 69 IN | RESPIRATION RATE: 18 BRPM | BODY MASS INDEX: 31.55 KG/M2 | OXYGEN SATURATION: 95 % | HEART RATE: 102 BPM | DIASTOLIC BLOOD PRESSURE: 103 MMHG | WEIGHT: 213 LBS | SYSTOLIC BLOOD PRESSURE: 171 MMHG

## 2022-02-14 DIAGNOSIS — C79.31 METASTASIS TO BRAIN (H): ICD-10-CM

## 2022-02-14 DIAGNOSIS — C79.31 BRAIN METASTASIS: ICD-10-CM

## 2022-02-14 DIAGNOSIS — C34.90 NON-SMALL CELL LUNG CANCER, UNSPECIFIED LATERALITY (H): ICD-10-CM

## 2022-02-14 LAB — SARS-COV-2 RNA RESP QL NAA+PROBE: NEGATIVE

## 2022-02-14 PROCEDURE — 77470 SPECIAL RADIATION TREATMENT: CPT | Performed by: RADIOLOGY

## 2022-02-14 PROCEDURE — 77470 SPECIAL RADIATION TREATMENT: CPT | Mod: 26 | Performed by: RADIOLOGY

## 2022-02-14 PROCEDURE — U0005 INFEC AGEN DETEC AMPLI PROBE: HCPCS | Performed by: RADIOLOGY

## 2022-02-14 PROCEDURE — G0463 HOSPITAL OUTPT CLINIC VISIT: HCPCS | Mod: 25 | Performed by: RADIOLOGY

## 2022-02-14 RX ORDER — FAMOTIDINE 40 MG/1
40 TABLET, FILM COATED ORAL DAILY
COMMUNITY
End: 2022-07-18

## 2022-02-14 ASSESSMENT — ENCOUNTER SYMPTOMS
PHOTOPHOBIA: 0
SHORTNESS OF BREATH: 1
HEARTBURN: 1
DIZZINESS: 0
SEIZURES: 0
FREQUENCY: 1
MUSCULOSKELETAL NEGATIVE: 1
DEPRESSION: 0
HEADACHES: 0
BLOOD IN STOOL: 0
BLURRED VISION: 1
DIARRHEA: 0
WEIGHT LOSS: 1
NAUSEA: 0
COUGH: 1
VOMITING: 0
HEMOPTYSIS: 1
DOUBLE VISION: 0
NERVOUS/ANXIOUS: 1
FEVER: 0
CONSTIPATION: 0

## 2022-02-14 ASSESSMENT — MIFFLIN-ST. JEOR: SCORE: 1851.54

## 2022-02-14 NOTE — LETTER
2022         RE: Connor Emerson  [address]  [facility] [address]        Dear Colleague,    Thank you for referring your patient, Connor Emerson, to the [facility] RADIATION ONCOLOGY GAMMA KNIFE. Please see a copy of my visit note below.      HPI    Date: 2/10/2022   Age: 41 year old  Ethnicity:     Sex: male  : 1980   Lives In: [place], [place]      Diagnosis: Non Small Cell Lung Cancer    Prior radiation therapy:   none    Prior chemotherapy:   None    RN time with patient: 55 minutes in person  Educated on Gamma Knife:yes    Doctors: Dr. Bassam Persaud, Dr. Sushila Lowery     Pain at time of consult: pt denies pain at time of consult  Does pt have a living will: pt does not have  Does pt have an implanted cardiac device: pt has no implanted cardiac device    Has pt fallen in past week: pt has not fallen  Does pt feel steady on his feet:  Pt feels steady      Review Since Diagnosis:    Pt having progressive SOB, beginning of 22 thru 22;  To ER at [facility] and admitted, thoracentesis x2 during the admission (1.7 liters and 2.0 liters removed), cytology showed malignancy, adenocarcinoma     22; right pleural mass biopsy, positive for adenocarcinoma consistent with lung primary, did another thoracentesis simultaneously (3 liters)    22; PET/CT, right lower lobe lesion representing primary lung adenocarcinoma, right perihilar, bilateral pretracheal, subcarinal and superior mediastinal michele mets, few lung nodules suspicious for contralateral mets.  At least 3 intracranial mets.  Unspecific mild diffuse marrow uptake.    22; pt saw Dr. Persaud in consult to establish care, pt still SOB with lying down and on left side-but is improved.  Chronic cough for 2-3 months, occasional blood streaked. Night sweats recently.  Occasional headaches last few days.  Are awaiting genetic testing to determine systemic treatment.  Recommended a Pleurex  catheter.  Stated would start dexamethasone 4 mg po daily for pt having some headaches.  Referral to Rad/Onc for opinion on radiation for brain mets.  Need Brain MRI    2/5/22; Brain MRI, 2.8 x 2.8 cm lesion right frontal                                  2.1 x 2.3 cm lesion left mid frontal                                  0.5 x 0.8 cm lesion right parietal                                  0.3 x 0.4 cm lesion left parietal                                  0.2 cm lesion left frontal                                  0.3 cm lesion left frontal                                  1.5 x 1.9 cm lesion left cerebellum                                  1.0 x 1.0 cm lesion left cerebellum                                  1.1 x 1.7 cm lesion right temporal    No mutations identified in analyzed genes     2/16/22; to have Pleurex catheter placed and see Dr. Persaud    Chief Complaint: pt chest heavy with breathing, no headaches, vision blurred both eyes, no change in mentation.l Pt has lost 30 lbs since January, but is eating alot  Review of Systems   Constitutional: Positive for malaise/fatigue and weight loss. Negative for fever.        Pt has lost 30 lbs since January but eating alot   Eyes: Positive for blurred vision. Negative for double vision and photophobia.   Respiratory: Positive for cough, hemoptysis and shortness of breath.    Cardiovascular: Negative for chest pain and leg swelling.   Gastrointestinal: Positive for heartburn. Negative for blood in stool, constipation, diarrhea, nausea and vomiting.   Genitourinary: Positive for frequency and urgency.   Musculoskeletal: Negative.    Neurological: Negative for dizziness, seizures and headaches.   Psychiatric/Behavioral: Negative for depression. The patient is nervous/anxious.                  RADIATION ONCOLOGY CONSULTATION  DATE OF VISIT: 2/14/2022    Connor Emerson  MRN: [id number]    PROBLEM: Mr. Connor Emerson was seen in consultation at the request of Dr. Banegas  Cori for consideration of Gamma Knife SRS for new diagnosis of metastatic lung cancer to the brain.    HISTORY OF PRESENT ILLNESS: Connor Emerson is a 41-year-old man who presented with progressive shortness of breath in January 2020.  Chest x-ray demonstrated a large right-sided pleural effusion.  He underwent thoracentesis on 2 occasions and cytology was positive for malignancy consistent with adenocarcinoma.  A right pleural mass was biopsied and demonstrated adenocarcinoma consistent with lung primary (+ TTF-1 and CK 7;  negative  p40, calretinin and WT-1. PAX8 immunostain focal +).  PET CT scan on 2/1/2022 demonstrated a right lower lobe central FDG avid infiltrative process measuring 8.2 x 9.6 cm consistent with primary lung adenocarcinoma.  There were ipsilateral right perihilar, bilateral pretracheal, subcarinal and superior mediastinal michele metastasis.  In addition, contralateral mildly FDG avid lung nodules were suspicious for contralateral metastasis.  At least 3 intracranial metastasis in the right frontal lobe, left frontal lobe and left cerebellar hemisphere were noted.      MRI of the brain on 2/5/2022 demonstrated multiple brain metastasis in the orbital right frontal lobe, the posterior left middle frontal gyrus, the right parietal lobe, the lateral left parietal lobe, anterior left frontal lobe, medial inferior left frontal lobe, right temporal lobe and 2 lesions in the left cerebellar hemisphere.  The largest lesion measures 2.8 x 2.8 cm located in the right frontal lobe.  There was surrounding moderate vasogenic edema involving the left cerebellar hemisphere.    Patient continues to have frequent thoracenteses and plan is for Pleurx catheter on Wednesday, 2/16/2022.  He can tell that fluid is building up again in his right lung.  He denies confusion, difficulty with vision, imbalance, weakness, numbness, difficulty with speech, difficulty with swallowing.  He has been experiencing headaches for  the past week or so. He is eating well but has lost 30 pounds in the last 3 to 4 weeks.  He is not on dexamethasone.    PAST MEDICAL HISTORY:   Diagnosis Date     Atypical chest pain 12/02/2013     Complication of anesthesia      Diabetes (H)      GERD (gastroesophageal reflux disease) 12/02/2013     HTN (hypertension) 05/14/2012     Insomnia 02/21/2012     Mediastinal lymphadenopathy      Migraine with aura, without mention of intractable migraine without mention of status migrainosus      Pneumonia        Type 2 diabetes mellitus without complication (H)       Lateral epicondylitis of right elbow     PAST SURGICAL HISTORY:   Procedure Laterality Date     BRONCHOSCOPY RIGID OR FLEXIBLE W/TRANSENDOSCOPIC ENDOBRONCHIAL ULTRASOUND GUIDED Bilateral 1/26/2022    Procedure: Right BRONCHOSCOPY, FIBEROPTIC, endobronchial ultrasound, pleural biopsy;  Surgeon: Dallin Agrawal MD;  Location: [facility] OR     INJECT BLOCK MEDIAL BRANCH CERVICAL/THORACIC/LUMBAR       ORTHOPEDIC SURGERY      Ganesh. Rotator cuff repair.     PLEUROSCOPY N/A 1/26/2022    Procedure: Pleuroscopy with Pleural Biopsy;  Surgeon: Dallin Agrawal MD;  Location: [facility] OR     CHEMOTHERAPY HISTORY: None     PAST RADIATION THERAPY HISTORY: None     IMPLANTABLE CARDIAC DEVICE: None     MEDICATIONS:   Medication Sig     famotidine (PEPCID) 40 MG tablet Take 40 mg by mouth daily     dexamethasone (DECADRON) 4 MG tablet Take 1 tablet (4 mg) by mouth 2 times daily (with meals)     insulin glargine (LANTUS PEN) 100 UNIT/ML pen Inject 15 Units Subcutaneous At Bedtime (Patient not taking: Reported on 2/14/2022)     insulin pen needle (31G X 8 MM) 31G X 8 MM miscellaneous Use one pen needles daily or as directed. (Patient not taking: Reported on 2/14/2022)      ALLERGIES:  is allergic to vicodin [hydrocodone-acetaminophen].    SOCIAL HISTORY:   Socioeconomic History     Marital status:      Spouse name: Kylie     Number of children: 4     Years of education:  "Not on file     Highest education level: Not on file   Occupational History     Not on file   Tobacco Use     Smoking status: Never Smoker     Smokeless tobacco: Never Used   Substance and Sexual Activity     Alcohol use: Yes     Alcohol/week: 0.0 standard drinks     Comment: social     Drug use: No     Sexual activity: Yes     Partners: Female   Other Topics Concern     Parent/sibling w/ CABG, MI or angioplasty before 65F 55M? No   Social History Narrative     Not on file     Social Determinants of Health     Financial Resource Strain: Not on file   Food Insecurity: Not on file   Transportation Needs: Not on file   Physical Activity: Not on file   Stress: Not on file   Social Connections: Not on file   Intimate Partner Violence: Not on file   Housing Stability: Not on file     FAMILY HISTORY:   Problem Relation Age of Onset     Unknown/Adopted Mother      Uterine Cancer Mother      Unknown/Adopted Father      Unknown/Adopted Maternal Grandmother      Unknown/Adopted Maternal Grandfather      Stomach Cancer Maternal Grandfather      Unknown/Adopted Paternal Grandmother      Unknown/Adopted Paternal Grandfather      Melanoma Maternal Uncle      REVIEW OF SYMPTOMS:  A full 14-point review of systems was performed. All pertinent positives and negatives are noted in HPI.    FUNCTIONAL STATUS: ECO (>10% weight loss in 1 month)    PHYSICAL EXAMINATION:    BP (!) 171/103   Pulse 102   Resp 18   Ht 1.753 m (5' 9\")   Wt 96.6 kg (213 lb)   SpO2 95%   BMI 31.45 kg/m    Exam:  General: Well-developed, well-nourished, in no acute distress, diaphoretic  HEENT: Normocephalic, atraumatic, extraocular movements intact, pupils equal round reactive  Neck: Supple, normal range of motion  Cardiovascular: Regular rate and rhythm, extremities warm well perfused  Respiratory: Breathing comfortably on room air at rest, no wheezing  Extremities: Without deformity, cyanosis  Neuro: Cranial nerves II/XII are grossly intact, normal " muscle bulk and tone all 4 extremities, gait normal   Skin: No rash on limited exam, no jaundice    IMAGING/PATH: As noted in HPI.  EXAM: MR BRAIN W/O AND W CONTRAST  LOCATION: [facility]  DATE/TIME: 2/5/2022 6:59 PM     INDICATION: Mediastinal lymphadenopathy, non-small cell lung cancer, unspecified laterality (H).  COMPARISON: PET/CT 02/01/2022.  CONTRAST: 10 mL Gadavist.  TECHNIQUE: Routine multiplanar multisequence head MRI without and with intravenous contrast.     FINDINGS:  INTRACRANIAL CONTENTS: Diffusion-weighted images demonstrate no areas of restricted diffusion. There are multiple enhancing intracranial metastatic lesions. Representative examples include 2.8 x 2.8 cm peripherally enhancing lesion with surrounding   moderate vasogenic type in the orbital right frontal lobe. 2.1 x 2.3 cm peripherally enhancing lesion with moderate vasogenic type edema in the posterior left middle frontal gyrus.     0.5 x 0.8 cm peripherally enhancing lesion in the right parietal lobe. 0.3 x 0.4 cm enhancing lesion in the lateral left parietal lobe. 0.2 cm enhancing lesion in the anterior left frontal lobe. 0.3 cm enhancing lesion in the medial inferior left frontal   lobe.     1.5 x 1.9 cm peripherally enhancing lesion in the left cerebellar hemisphere. There is an adjacent 1 x 1 cm peripherally enhancing lesion in the left cerebellar hemisphere. These lesions have moderate surrounding vasogenic type edema.     1.1 x 1.7 cm enhancing lesion in the right temporal lobe with surrounding moderate vasogenic type edema.     Cerebral parenchymal volume is within normal limits. No midline shift. The basilar cisterns are patent. No cerebellar tonsillar ectopia.     SELLA: No abnormality accounting for technique.     OSSEOUS STRUCTURES/SOFT TISSUES: Normal marrow signal. The major intracranial vascular flow voids are maintained.      ORBITS: No abnormality accounting for technique.      SINUSES/MASTOIDS: Mild  mucosal thickening of the ethmoid air cells. No middle ear or mastoid effusion.                                                                       IMPRESSION:  1.  At least 9 intracranial metastases as detailed above. The dominant lesions involving the orbital right frontal lobe, the posterior left middle frontal gyrus, anterior right temporal lobe and in the left cerebellar hemisphere have surrounding moderate   vasogenic type edema.    Combined Report of:    PET and CT on  2/1/2022 3:16 PM :     INDICATION: Mediastinal lymphadenopathy; Non-small cell lung cancer,  unspecified laterality (H)     ADDITIONAL INFORMATION OBTAINED FROM EMR: 41-year-old male presenting  with 6 days of shortness of breath, was found to have a massive  right-sided pleural effusion on admission. Further workup was  undertaken including a pleural biopsy which demonstrated lung  adenocarcinoma. Thoracentesis draining almost 1800 cc of fluid.     COMPARISON: Chest CT 1/19/2022.     FINDINGS:      HEAD/NECK:     There are three rim enhancing masses in the brain parenchyma  representing intracranial metastases: In the left superior frontal  lobe 23 x 25 mm, right inferior frontal lobe 26 x 25 mm, left inferior  cerebellar hemisphere 21 x 17 mm lesions. They show peripheral FDG  avidity. They cause local mass effect with effacement of the adjacent  sulci. There is slight posterior displacement of the right frontal  horn.  Mildly FDG avid (max SUV 2.7) 1.2 cm left level 2A round node is  indeterminate.  The paranasal sinuses are clear. The mastoid air cells are clear.  Bilateral parotid glands and submandibular glands are normal.  The mucosal pharyngeal space is normal.   The thyroid gland is within normal limits.     CHEST:  Right lower lobe central, irregular, heterogenously enhancing and  intensely FDG avid mass. It measures 8.3 cm x 9.6 cm. The max SUV is  9.3. It encases the right lower lobe bronchi and result in collapse of  the majority  of the right lower lobe. Posterior to it extends to  involve the parietal pleura. Slightly cranially there is a separate  focus of nodular FDG avid pleural thickening. The medial aspect of the  mass encases and causes tapering and eventual occlusion of the right  posterior pulmonary vein. Anteromedially it extends to the mediastinum  and involves short segments of the pericardium. There is trace  pericardial effusion. The mass briefly contacts the vena cava.      There are enlarged right hilar and subcarinal FDG avid metastatic  lymphadenopathy. For example the subcarinal lymphadenopathy measures  3.3 x 2.4 cm with max SUV of 10.0. The right hilar lymphadenopathy  measures 4.4 cm with max SUV of 9.9. There are FDG avid right lower  paratracheal, left lower paratracheal slightly enlarged  lymphadenopathy. There is an FDG avid right internal mammary  lymphadenopathy. In the supraclavicular region there is an FDG avid  conglomeration of several adjacent nodules, each measuring up to 8 mm  in size.     There is residual loculated pleural effusion on the right.      In the left lung upper lobe there is an FDG avid 1.1 cm nodule. In the  lingula there is an FDG avid 4-5 mm nodule. In the left lower lobe  there is an FDG avid 7 mm nodule.      In the distal esophagus there is mild FDG uptake along the mucosa  without evident mass. This is probably due to reflux esophagitis.     There is a minimally FDG avid retrocrural subcentimeter node,  indeterminate.     ABDOMEN AND PELVIS:     There are bilateral mildly FDG avid inguinal lymph nodes, larger on  the left measuring 1.6 cm. The max SUV is 4.6. These are probably  reactive.  There is no other suspicious FDG uptake in the abdomen or pelvis.     There are no suspicious hepatic lesions. There is no splenomegaly or  evidence for splenic or pancreatic mass lesion.  There are no suspicious adrenal mass lesions or opaque gallbladder  calculi. There is symmetric nephrographic  renal phase without  hydronephrosis.     There is no evidence for diverticulitis, bowel obstruction or free  fluid.     LOWER EXTREMITIES:   No abnormal masses or hypermetabolic lesions.      BONES: There are couple ribs along the right lateral chest wall that  demonstrate focal increased FDG uptake. The max SUV measures up to  4.7, no definite corresponding abnormality on CT. There is mild  diffuse FDG metabolism by the bone marrow of the spinal axis and  proximal appendical skeleton. No definite corresponding CT  abnormality. This is indeterminate and could be further evaluated with  MRI. It could represent hyperactive red marrow versus early marrow  infiltration.                                                                   IMPRESSION: In this patient with newly diagnosed adenocarcinoma of the  lun. Right lower lobe central infiltrative FDG avid 8.2 x 9.6 cm mass  representing a primary lung adenocarcinoma.      2. Ipsilateral right perihilar, bilateral pretracheal, subcarinal and  superior mediastinal michele metastases.     3. Contralateral mildly FDG avid few coronary nodules are suspicious  for contralateral metastasis.     4. At least 3 intracranial metastases in the right frontal lobe, left  frontal lobe and left cerebellar hemisphere. Brain MRI is recommended  to further evaluate.      5. Nonspecific mild diffuse bone marrow uptake. Further evaluation  with a spine MRI could be considered to rule out early marrow  infiltration. This could also be seen with red marrow conversion.     [Consider Follow Up: 3 intracranial metastatic masses. Further  evaluation with brain MRI is recommended.]     This report will be copied to the [facility] to ensure a  provider acknowledges the finding. [facility] Center is available Monday  through Friday 8 am-3:30 pm.     I have personally reviewed the examination and initial interpretation  and I agree with the findings.     DORIAN STALLINGS MD  "    Addendum  Addendum for the item 3 of the impression part of the report. It  should say:   \"Contralateral mildly FDG avid few PULMONARY nodules suspicious for  contralateral pulmonary metastases\".     DORIAN STALLINGS MD     PATHOLOGY:  Case: [id number]   01/26/2022  Final Diagnosis  A. RIGHT PLEURAL MASS, BIOPSY:  -POSITIVE FOR ADENOCARCINOMA CONSISTENT WITH LUNG PRIMARY, admixed with mesothelial hyperplasia and inflammatory infiltrate, see comment.    Microscopic Description  Microscopic examination performed.  Immunohistochemistry studies, performed with appropriate controls on block A1, demonstrate the neoplastic cells are positive for TTF-1 and cytokeratin 7 and are negative for p40, calretinin and WT-1 (calretinin and WT 1 highlight mesothelial cells).  PAX8 immunostain is positive in a subset of neoplastic cells, and p40 immunostain is negative.    RESULT FOR IMMUNOHISTOCHEMICAL VENTANA CLONE  PD-L1 ASSAY  TUMOR PROPORTION SCORE (TPS): 2-3%  INTERPRETATION: LOW PD-L1 EXPRESSION (TPS >/=1-49%)    ASSESSMENT AND PLAN: Connor Emerson is a 41-year-old man with newly diagnosed metastatic adenocarcinoma of the lung who presents with multiple brain metastasis.  His father is also present for consultation.  I discussed the rationale for recommending treatment for his brain metastasis and reviewed options of whole brain radiation as compared with SRS.  In particular, I discussed the different effects on cognitive functioning and short-term memory with each treatment.  I also discussed in more detail the side effects and risks of gamma knife radiosurgery.  He can feel more fluid building in the right thoracic cavity but feels he is able to lie flat for treatment tomorrow.  We will plan to treat with frame based radiosurgery on 2/15/2022.  Patient and his father asked several questions which were answered such that they verbalized understanding of the issues.  Informed consent was obtained.    Please feel free to call " with any questions or concerns.    70 minutes spent on the date of the encounter doing chart review, history and exam, documentation and further activities per the note.    Annette Arango MD  Department of Radiation Oncology  [facility]    CC  Patient Care Team:  Radha Shetty Ra, JERRY SHULTZ as PCP - General (Family Practice)  Jayden King PA-C as Assigned PCP  CARMEN NORRIS

## 2022-02-14 NOTE — PROGRESS NOTES
RADIATION ONCOLOGY CONSULTATION  DATE OF VISIT: 2/14/2022    Connor Emerson  MRN: [id number]    PROBLEM: Mr. Connor Emerson was seen in consultation at the request of Dr. Bassam Persaud for consideration of Gamma Knife SRS for new diagnosis of metastatic lung cancer to the brain.    HISTORY OF PRESENT ILLNESS: Connor Emerson is a 41-year-old man who presented with progressive shortness of breath in January 2020.  Chest x-ray demonstrated a large right-sided pleural effusion.  He underwent thoracentesis on 2 occasions and cytology was positive for malignancy consistent with adenocarcinoma.  A right pleural mass was biopsied and demonstrated adenocarcinoma consistent with lung primary (+ TTF-1 and CK 7;  negative  p40, calretinin and WT-1. PAX8 immunostain focal +).  PET CT scan on 2/1/2022 demonstrated a right lower lobe central FDG avid infiltrative process measuring 8.2 x 9.6 cm consistent with primary lung adenocarcinoma.  There were ipsilateral right perihilar, bilateral pretracheal, subcarinal and superior mediastinal michele metastasis.  In addition, contralateral mildly FDG avid lung nodules were suspicious for contralateral metastasis.  At least 3 intracranial metastasis in the right frontal lobe, left frontal lobe and left cerebellar hemisphere were noted.      MRI of the brain on 2/5/2022 demonstrated multiple brain metastasis in the orbital right frontal lobe, the posterior left middle frontal gyrus, the right parietal lobe, the lateral left parietal lobe, anterior left frontal lobe, medial inferior left frontal lobe, right temporal lobe and 2 lesions in the left cerebellar hemisphere.  The largest lesion measures 2.8 x 2.8 cm located in the right frontal lobe.  There was surrounding moderate vasogenic edema involving the left cerebellar hemisphere.    Patient continues to have frequent thoracenteses and plan is for Pleurx catheter on Wednesday, 2/16/2022.  He can tell that fluid is building up again in his right  lung.  He denies confusion, difficulty with vision, imbalance, weakness, numbness, difficulty with speech, difficulty with swallowing.  He has been experiencing headaches for the past week or so. He is eating well but has lost 30 pounds in the last 3 to 4 weeks.  He is not on dexamethasone.    PAST MEDICAL HISTORY:   Diagnosis Date     Atypical chest pain 12/02/2013     Complication of anesthesia      Diabetes (H)      GERD (gastroesophageal reflux disease) 12/02/2013     HTN (hypertension) 05/14/2012     Insomnia 02/21/2012     Mediastinal lymphadenopathy      Migraine with aura, without mention of intractable migraine without mention of status migrainosus      Pneumonia        Type 2 diabetes mellitus without complication (H)       Lateral epicondylitis of right elbow     PAST SURGICAL HISTORY:   Procedure Laterality Date     BRONCHOSCOPY RIGID OR FLEXIBLE W/TRANSENDOSCOPIC ENDOBRONCHIAL ULTRASOUND GUIDED Bilateral 1/26/2022    Procedure: Right BRONCHOSCOPY, FIBEROPTIC, endobronchial ultrasound, pleural biopsy;  Surgeon: Dallin Agrawal MD;  Location: [facility] OR     INJECT BLOCK MEDIAL BRANCH CERVICAL/THORACIC/LUMBAR       ORTHOPEDIC SURGERY      Ganesh. Rotator cuff repair.     PLEUROSCOPY N/A 1/26/2022    Procedure: Pleuroscopy with Pleural Biopsy;  Surgeon: Dallin Agrawal MD;  Location: [facility] OR     CHEMOTHERAPY HISTORY: None     PAST RADIATION THERAPY HISTORY: None     IMPLANTABLE CARDIAC DEVICE: None     MEDICATIONS:   Medication Sig     famotidine (PEPCID) 40 MG tablet Take 40 mg by mouth daily     dexamethasone (DECADRON) 4 MG tablet Take 1 tablet (4 mg) by mouth 2 times daily (with meals)     insulin glargine (LANTUS PEN) 100 UNIT/ML pen Inject 15 Units Subcutaneous At Bedtime (Patient not taking: Reported on 2/14/2022)     insulin pen needle (31G X 8 MM) 31G X 8 MM miscellaneous Use one pen needles daily or as directed. (Patient not taking: Reported on 2/14/2022)      ALLERGIES:  is allergic to vicodin  "[hydrocodone-acetaminophen].    SOCIAL HISTORY:   Socioeconomic History     Marital status:      Spouse name: Kylie     Number of children: 4     Years of education: Not on file     Highest education level: Not on file   Occupational History     Not on file   Tobacco Use     Smoking status: Never Smoker     Smokeless tobacco: Never Used   Substance and Sexual Activity     Alcohol use: Yes     Alcohol/week: 0.0 standard drinks     Comment: social     Drug use: No     Sexual activity: Yes     Partners: Female   Other Topics Concern     Parent/sibling w/ CABG, MI or angioplasty before 65F 55M? No   Social History Narrative     Not on file     Social Determinants of Health     Financial Resource Strain: Not on file   Food Insecurity: Not on file   Transportation Needs: Not on file   Physical Activity: Not on file   Stress: Not on file   Social Connections: Not on file   Intimate Partner Violence: Not on file   Housing Stability: Not on file     FAMILY HISTORY:   Problem Relation Age of Onset     Unknown/Adopted Mother      Uterine Cancer Mother      Unknown/Adopted Father      Unknown/Adopted Maternal Grandmother      Unknown/Adopted Maternal Grandfather      Stomach Cancer Maternal Grandfather      Unknown/Adopted Paternal Grandmother      Unknown/Adopted Paternal Grandfather      Melanoma Maternal Uncle      REVIEW OF SYMPTOMS:  A full 14-point review of systems was performed. All pertinent positives and negatives are noted in HPI.    FUNCTIONAL STATUS: ECO (>10% weight loss in 1 month)    PHYSICAL EXAMINATION:    BP (!) 171/103   Pulse 102   Resp 18   Ht 1.753 m (5' 9\")   Wt 96.6 kg (213 lb)   SpO2 95%   BMI 31.45 kg/m    Exam:  General: Well-developed, well-nourished, in no acute distress, diaphoretic  HEENT: Normocephalic, atraumatic, extraocular movements intact, pupils equal round reactive  Neck: Supple, normal range of motion  Cardiovascular: Regular rate and rhythm, extremities warm well " perfused  Respiratory: Breathing comfortably on room air at rest, no wheezing  Extremities: Without deformity, cyanosis  Neuro: Cranial nerves II/XII are grossly intact, normal muscle bulk and tone all 4 extremities, gait normal   Skin: No rash on limited exam, no jaundice    IMAGING/PATH: As noted in HPI.  EXAM: MR BRAIN W/O AND W CONTRAST  LOCATION: [facility]  DATE/TIME: 2/5/2022 6:59 PM     INDICATION: Mediastinal lymphadenopathy, non-small cell lung cancer, unspecified laterality (H).  COMPARISON: PET/CT 02/01/2022.  CONTRAST: 10 mL Gadavist.  TECHNIQUE: Routine multiplanar multisequence head MRI without and with intravenous contrast.     FINDINGS:  INTRACRANIAL CONTENTS: Diffusion-weighted images demonstrate no areas of restricted diffusion. There are multiple enhancing intracranial metastatic lesions. Representative examples include 2.8 x 2.8 cm peripherally enhancing lesion with surrounding   moderate vasogenic type in the orbital right frontal lobe. 2.1 x 2.3 cm peripherally enhancing lesion with moderate vasogenic type edema in the posterior left middle frontal gyrus.     0.5 x 0.8 cm peripherally enhancing lesion in the right parietal lobe. 0.3 x 0.4 cm enhancing lesion in the lateral left parietal lobe. 0.2 cm enhancing lesion in the anterior left frontal lobe. 0.3 cm enhancing lesion in the medial inferior left frontal   lobe.     1.5 x 1.9 cm peripherally enhancing lesion in the left cerebellar hemisphere. There is an adjacent 1 x 1 cm peripherally enhancing lesion in the left cerebellar hemisphere. These lesions have moderate surrounding vasogenic type edema.     1.1 x 1.7 cm enhancing lesion in the right temporal lobe with surrounding moderate vasogenic type edema.     Cerebral parenchymal volume is within normal limits. No midline shift. The basilar cisterns are patent. No cerebellar tonsillar ectopia.     SELLA: No abnormality accounting for technique.     OSSEOUS  STRUCTURES/SOFT TISSUES: Normal marrow signal. The major intracranial vascular flow voids are maintained.      ORBITS: No abnormality accounting for technique.      SINUSES/MASTOIDS: Mild mucosal thickening of the ethmoid air cells. No middle ear or mastoid effusion.                                                                       IMPRESSION:  1.  At least 9 intracranial metastases as detailed above. The dominant lesions involving the orbital right frontal lobe, the posterior left middle frontal gyrus, anterior right temporal lobe and in the left cerebellar hemisphere have surrounding moderate   vasogenic type edema.    Combined Report of:    PET and CT on  2/1/2022 3:16 PM :     INDICATION: Mediastinal lymphadenopathy; Non-small cell lung cancer,  unspecified laterality (H)     ADDITIONAL INFORMATION OBTAINED FROM EMR: 41-year-old male presenting  with 6 days of shortness of breath, was found to have a massive  right-sided pleural effusion on admission. Further workup was  undertaken including a pleural biopsy which demonstrated lung  adenocarcinoma. Thoracentesis draining almost 1800 cc of fluid.     COMPARISON: Chest CT 1/19/2022.     FINDINGS:      HEAD/NECK:     There are three rim enhancing masses in the brain parenchyma  representing intracranial metastases: In the left superior frontal  lobe 23 x 25 mm, right inferior frontal lobe 26 x 25 mm, left inferior  cerebellar hemisphere 21 x 17 mm lesions. They show peripheral FDG  avidity. They cause local mass effect with effacement of the adjacent  sulci. There is slight posterior displacement of the right frontal  horn.  Mildly FDG avid (max SUV 2.7) 1.2 cm left level 2A round node is  indeterminate.  The paranasal sinuses are clear. The mastoid air cells are clear.  Bilateral parotid glands and submandibular glands are normal.  The mucosal pharyngeal space is normal.   The thyroid gland is within normal limits.     CHEST:  Right lower lobe central,  irregular, heterogenously enhancing and  intensely FDG avid mass. It measures 8.3 cm x 9.6 cm. The max SUV is  9.3. It encases the right lower lobe bronchi and result in collapse of  the majority of the right lower lobe. Posterior to it extends to  involve the parietal pleura. Slightly cranially there is a separate  focus of nodular FDG avid pleural thickening. The medial aspect of the  mass encases and causes tapering and eventual occlusion of the right  posterior pulmonary vein. Anteromedially it extends to the mediastinum  and involves short segments of the pericardium. There is trace  pericardial effusion. The mass briefly contacts the vena cava.      There are enlarged right hilar and subcarinal FDG avid metastatic  lymphadenopathy. For example the subcarinal lymphadenopathy measures  3.3 x 2.4 cm with max SUV of 10.0. The right hilar lymphadenopathy  measures 4.4 cm with max SUV of 9.9. There are FDG avid right lower  paratracheal, left lower paratracheal slightly enlarged  lymphadenopathy. There is an FDG avid right internal mammary  lymphadenopathy. In the supraclavicular region there is an FDG avid  conglomeration of several adjacent nodules, each measuring up to 8 mm  in size.     There is residual loculated pleural effusion on the right.      In the left lung upper lobe there is an FDG avid 1.1 cm nodule. In the  lingula there is an FDG avid 4-5 mm nodule. In the left lower lobe  there is an FDG avid 7 mm nodule.      In the distal esophagus there is mild FDG uptake along the mucosa  without evident mass. This is probably due to reflux esophagitis.     There is a minimally FDG avid retrocrural subcentimeter node,  indeterminate.     ABDOMEN AND PELVIS:     There are bilateral mildly FDG avid inguinal lymph nodes, larger on  the left measuring 1.6 cm. The max SUV is 4.6. These are probably  reactive.  There is no other suspicious FDG uptake in the abdomen or pelvis.     There are no suspicious hepatic  lesions. There is no splenomegaly or  evidence for splenic or pancreatic mass lesion.  There are no suspicious adrenal mass lesions or opaque gallbladder  calculi. There is symmetric nephrographic renal phase without  hydronephrosis.     There is no evidence for diverticulitis, bowel obstruction or free  fluid.     LOWER EXTREMITIES:   No abnormal masses or hypermetabolic lesions.      BONES: There are couple ribs along the right lateral chest wall that  demonstrate focal increased FDG uptake. The max SUV measures up to  4.7, no definite corresponding abnormality on CT. There is mild  diffuse FDG metabolism by the bone marrow of the spinal axis and  proximal appendical skeleton. No definite corresponding CT  abnormality. This is indeterminate and could be further evaluated with  MRI. It could represent hyperactive red marrow versus early marrow  infiltration.                                                                   IMPRESSION: In this patient with newly diagnosed adenocarcinoma of the  lun. Right lower lobe central infiltrative FDG avid 8.2 x 9.6 cm mass  representing a primary lung adenocarcinoma.      2. Ipsilateral right perihilar, bilateral pretracheal, subcarinal and  superior mediastinal michele metastases.     3. Contralateral mildly FDG avid few coronary nodules are suspicious  for contralateral metastasis.     4. At least 3 intracranial metastases in the right frontal lobe, left  frontal lobe and left cerebellar hemisphere. Brain MRI is recommended  to further evaluate.      5. Nonspecific mild diffuse bone marrow uptake. Further evaluation  with a spine MRI could be considered to rule out early marrow  infiltration. This could also be seen with red marrow conversion.     [Consider Follow Up: 3 intracranial metastatic masses. Further  evaluation with brain MRI is recommended.]     This report will be copied to the [facility] to ensure a  provider acknowledges the finding. Access  "Center is available Monday  through Friday 8 am-3:30 pm.     I have personally reviewed the examination and initial interpretation  and I agree with the findings.     DORIAN STALLINGS MD     Addendum  Addendum for the item 3 of the impression part of the report. It  should say:   \"Contralateral mildly FDG avid few PULMONARY nodules suspicious for  contralateral pulmonary metastases\".     DORIAN STALLINGS MD     PATHOLOGY:  Case: [id number]   01/26/2022  Final Diagnosis  A. RIGHT PLEURAL MASS, BIOPSY:  -POSITIVE FOR ADENOCARCINOMA CONSISTENT WITH LUNG PRIMARY, admixed with mesothelial hyperplasia and inflammatory infiltrate, see comment.    Microscopic Description  Microscopic examination performed.  Immunohistochemistry studies, performed with appropriate controls on block A1, demonstrate the neoplastic cells are positive for TTF-1 and cytokeratin 7 and are negative for p40, calretinin and WT-1 (calretinin and WT 1 highlight mesothelial cells).  PAX8 immunostain is positive in a subset of neoplastic cells, and p40 immunostain is negative.    RESULT FOR IMMUNOHISTOCHEMICAL VENTANA CLONE  PD-L1 ASSAY  TUMOR PROPORTION SCORE (TPS): 2-3%  INTERPRETATION: LOW PD-L1 EXPRESSION (TPS >/=1-49%)    ASSESSMENT AND PLAN: Connor Emerson is a 41-year-old man with newly diagnosed metastatic adenocarcinoma of the lung who presents with multiple brain metastasis.  His father is also present for consultation.  I discussed the rationale for recommending treatment for his brain metastasis and reviewed options of whole brain radiation as compared with SRS.  In particular, I discussed the different effects on cognitive functioning and short-term memory with each treatment.  I also discussed in more detail the side effects and risks of gamma knife radiosurgery.  He can feel more fluid building in the right thoracic cavity but feels he is able to lie flat for treatment tomorrow.  We will plan to treat with frame based radiosurgery on 2/15/2022.  " Patient and his father asked several questions which were answered such that they verbalized understanding of the issues.  Informed consent was obtained.    Please feel free to call with any questions or concerns.    70 minutes spent on the date of the encounter doing chart review, history and exam, documentation and further activities per the note.    Annette Arango MD  Department of Radiation Oncology  [facility]    CC  Patient Care Team:  Radha Shetty Ra, JERRY SHULTZ as PCP - General (Family Practice)  Jayden King PA-C as Assigned PCP  CARMEN NORRIS

## 2022-02-14 NOTE — PROGRESS NOTES
HPI    Date: 2/10/2022   Age: 41 year old  Ethnicity:     Sex: male  : 1980   Lives In: [place], [place]      Diagnosis: Non Small Cell Lung Cancer    Prior radiation therapy:   none    Prior chemotherapy:   None    RN time with patient: 55 minutes in person  Educated on Gamma Knife:yes    Doctors: Dr. Bassam Persaud, Dr. Sushila Lowery     Pain at time of consult: pt denies pain at time of consult  Does pt have a living will: pt does not have  Does pt have an implanted cardiac device: pt has no implanted cardiac device    Has pt fallen in past week: pt has not fallen  Does pt feel steady on his feet:  Pt feels steady      Review Since Diagnosis:    Pt having progressive SOB, beginning of 22 thru 22;  To ER at [facility] and admitted, thoracentesis x2 during the admission (1.7 liters and 2.0 liters removed), cytology showed malignancy, adenocarcinoma     22; right pleural mass biopsy, positive for adenocarcinoma consistent with lung primary, did another thoracentesis simultaneously (3 liters)    22; PET/CT, right lower lobe lesion representing primary lung adenocarcinoma, right perihilar, bilateral pretracheal, subcarinal and superior mediastinal michele mets, few lung nodules suspicious for contralateral mets.  At least 3 intracranial mets.  Unspecific mild diffuse marrow uptake.    22; pt saw Dr. Persaud in consult to establish care, pt still SOB with lying down and on left side-but is improved.  Chronic cough for 2-3 months, occasional blood streaked. Night sweats recently.  Occasional headaches last few days.  Are awaiting genetic testing to determine systemic treatment.  Recommended a Pleurex catheter.  Stated would start dexamethasone 4 mg po daily for pt having some headaches.  Referral to Rad/Onc for opinion on radiation for brain mets.  Need Brain MRI    22; Brain MRI, 2.8 x 2.8 cm lesion right frontal                                  2.1 x 2.3 cm lesion  left mid frontal                                  0.5 x 0.8 cm lesion right parietal                                  0.3 x 0.4 cm lesion left parietal                                  0.2 cm lesion left frontal                                  0.3 cm lesion left frontal                                  1.5 x 1.9 cm lesion left cerebellum                                  1.0 x 1.0 cm lesion left cerebellum                                  1.1 x 1.7 cm lesion right temporal    No mutations identified in analyzed genes     2/16/22; to have Pleurex catheter placed and see Dr. Persaud    Chief Complaint: pt chest heavy with breathing, no headaches, vision blurred both eyes, no change in mentation.l Pt has lost 30 lbs since January, but is eating alot  Review of Systems   Constitutional: Positive for malaise/fatigue and weight loss. Negative for fever.        Pt has lost 30 lbs since January but eating alot   Eyes: Positive for blurred vision. Negative for double vision and photophobia.   Respiratory: Positive for cough, hemoptysis and shortness of breath.    Cardiovascular: Negative for chest pain and leg swelling.   Gastrointestinal: Positive for heartburn. Negative for blood in stool, constipation, diarrhea, nausea and vomiting.   Genitourinary: Positive for frequency and urgency.   Musculoskeletal: Negative.    Neurological: Negative for dizziness, seizures and headaches.   Psychiatric/Behavioral: Negative for depression. The patient is nervous/anxious.

## 2022-02-14 NOTE — PROGRESS NOTES
MEDICAL ONCOLOGY FOLLOW UP NOTE    PATIENT NAME: Connor Emerson  ENCOUNTER DATE: 2/16/2022    Care Team  Primary Oncologist: Bassam Persaud MD    REASON FOR CURRENT VISIT: F/u of lung cancer    HISTORY OF PRESENT ILLNESS:  Mr. Connor Emerson is a 41 year old  male who is a non-smoker with PMHx of T2DM, HTN with newly diagnosed metastatic NSCLC comes for evaluation    Oncologic Hx:    Diagnosis:   Stage IV NSCLC, Rt lung adenocarcinoma with metastasis to pleura, mediastinum , rt pleural effusion and brain diagnosed 1/2022 (AJCC 8th edition)  PD-L1 TPS 2-3% by [place]   NGS [facility] panel-EML4:ALK rearragement  NGS Guardant- GNAS R201H, KRAS K5E- No ALK    Treatment:   2/15/22- GK to 11 briain lesions  Anticipating Alectinib 600 mg BID    Intent of treatment: Palliative    Oncologic course:  1/19/22 to 1/22/22-Admitted to [facility] for 2 week progressive SOB secondary to have large rt sided pleural effusion, needing thoracentesis x2 (1.7L and 2.0 L removed), cytology positive for malignancy, adenocarcinoma.   1/26/22- Rt pleural mass biopsy-Dr. Agrawal--POSITIVE FOR ADENOCARCINOMA CONSISTENT WITH LUNG PRIMARY, admixed with mesothelial hyperplasia and inflammatory infiltrate (+ TTF-1 and CK 7;  negative  p40, calretinin and WT-1. PAX8 immunostain focal +). 4th thoracentesis done simultaneously - 3L approx removed.   2/1/22- PET/CT-Right lower lobe central infiltrative FDG avid 8.2 x 9.6 cm mass representing a primary lung adenocarcinoma. Ipsilateral right perihilar, bilateral pretracheal, subcarinal and superior mediastinal michele metastases. Contralateral mildly FDG avid few lung nodules are suspicious for contralateral metastasis. At least 3 intracranial metastases in the right frontal lobe, left frontal lobe and left cerebellar hemisphere. Nonspecific mild diffuse bone marrow uptake. Further evaluation with a spine MRI could be considered to rule out early marrow infiltration. This could also be seen with red marrow  conversion.  2/5/22-  Brain MRI- At least 9 intracranial metastases as detailed above. The dominant lesions involving the orbital right frontal lobe, the posterior left middle frontal gyrus, anterior right temporal lobe and in the left cerebellar hemisphere have surrounding moderate vasogenic type edema.  2/15/22- Saw Dr. Arango from [facility]- Rcd GK to 12 lesion in bran  2/16/22- Pleurex placement       Interval Hx:  Patient is here today with his wife Kylie. His father with was also on speaker phone during this encounter.   He got pleurex placement today and also finished GK treatment yesterday.   He is c/o headaches today and rt chest pain following the pleurex.  He has no new symptoms since last time I saw him.  He he did endorse some anxiety since her last visit.  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       REVIEW OF SYSTEMS: 14 point ROS negative other than the symptoms noted above in the HPI.    Wt Readings from Last 4 Encounters:   02/14/22 96.6 kg (213 lb)   02/02/22 100.7 kg (222 lb)   01/26/22 103.2 kg (227 lb 8.2 oz)   01/25/22 103.6 kg (228 lb 4.8 oz)          Review of Systems:  A comprehensive ROS was performed and found to be negative or non-contributory with the exception of that noted in the HPI above.    Past Medical History:  GERD  Hypertension, not on medication  Type 2 diabetes mellitus, not on medications currently, previously on Metformin        Past Surgical History:  Past Surgical History:   Procedure Laterality Date     BRONCHOSCOPY RIGID OR FLEXIBLE W/TRANSENDOSCOPIC ENDOBRONCHIAL ULTRASOUND GUIDED Bilateral 1/26/2022    Procedure: Right BRONCHOSCOPY, FIBEROPTIC, endobronchial ultrasound, pleural biopsy;  Surgeon: Dallin Agrawal MD;  Location: [facility] OR     INJECT BLOCK MEDIAL BRANCH CERVICAL/THORACIC/LUMBAR       ORTHOPEDIC SURGERY      Ganesh. Rotator cuff repair.     PLEUROSCOPY N/A 1/26/2022     Procedure: Pleuroscopy with Pleural Biopsy;  Surgeon: Dallin Agrawal MD;  Location:  OR       Social History:  Lives with wife and 4 kids in [place]. Works as a  for an apartment complex in [place]. Exposure to household chemicals and . No significant exposure to asbestos. No signal exposure to benzene or similar chemicals. No significant smoking history-states that he smoked 1 to 2 cigarettes occasionally per month for about 2 years in college, non-smoking since then. No significant alcohol use history. No other recreational substances. Good support system. Kids are 23, 19, 17 and 13.    Family History  Significant history for cancers on maternal side. Mother  of uterine cancer. 2 maternal uncles have possible metastatic melanoma.    Outpatient Medications:  Not currently taking any outpatient medications. Has been prescribed Metformin in the past.    Physical Exam:    Blood pressure 132/84, pulse 97, temperature 98.2  F (36.8  C), temperature source Oral, resp. rate 16, weight 95.9 kg (211 lb 6.7 oz), SpO2 96 %.     General: alert and cooperative, sitting up in chair, appears very anxious.   HEENT: sclera anicteric, EOMI, MMM. Pupils equal and reactive.   Neck: supple, normal ROM, no lymph nodes palpable.   CV: RRR, no murmurs  Resp: Right chest Pleurx catheter  GI: soft, non-tender, non-distended, bowel sounds present and normoactive  MSK: warm and well-perfused, normal tone  Skin: no rashes on limited exam, no jaundice  Neuro: Alert awake and oriented x4, strength is 5 on 5 throughout, sensations are grossly intact    Labs & Studies: I personally reviewed the following studies:    Recent Labs   Lab Test 22  0609 22  2006 17  1013 16  1950 16  1417   WBC 7.8 10.1 9.3 12.1* 10.2   RBC 4.67 5.36 4.88 5.25 5.07   HGB 14.0 15.9 15.2 16.4 15.7   HCT 43.4 49.4 43.8 47.3 45.8   MCV 93 92 90 90 90   MCH 30.0 29.7 31.1 31.2 31.0   MCHC 32.3 32.2 34.7  34.7 34.3   RDW 12.1 12.1 12.9 12.9 13.0    381 187 245 256   NEUTROPHIL  --  73  --  64.5 70.3     Recent Labs   Lab Test 01/26/22  1147 01/26/22  1110 01/26/22  1044 01/21/22  0758 01/21/22  0622 01/20/22  1309 01/20/22  0609 01/20/22  0041 01/19/22 2006   NA  --   --   --   --  140  --  141  --  137   POTASSIUM  --   --   --   --  3.7  --  3.8  --  4.0   CHLORIDE  --   --   --   --  108  --  109  --  104   CO2  --   --   --   --  28  --  29  --  30   ANIONGAP  --   --   --   --  4  --  3  --  3   * 256* 238*   < > 236*   < > 239*   < > 399*   BUN  --   --   --   --  12  --  12  --  14   CR  --   --   --   --  0.52*  --  0.52*  --  0.56*   TORY  --   --   --   --  8.6  --  8.2*  --  9.1    < > = values in this interval not displayed.     Recent Labs   Lab Test 01/20/22  0609 01/19/22  2006 08/10/18  1110   PROTTOTAL 5.7* 7.0  7.2 6.8   ALBUMIN 2.2* 3.0* 4.1   BILITOTAL 0.3 0.5 0.7   ALKPHOS 69 87 79   AST 8 12 19   ALT 18 24 28       ASSESSMENT AND PLAN:  Connor Corrales is a 41-year-old male non-smoker with a past medical history of hypertension and diabetes who presented with several weeks of dyspnea and found to have right-sided pleural effusion and a large right lung hilar mass, status post bronchoscopy and EBUS on 1/26/2022, with tissue sampling demonstrating adenocarcinoma on histopathological analysis. Mr. Emerson is here to establish care with medical oncology.    Stage IV NSCLC, Rt lung adenocarcinoma with metastasis to pleura, mediastinum , rt pleural effusion and brain diagnosed 1/2022 (AJCC 8th edition)  PD-L1 TPS 2-3% by [place]   NGS [facility] panel-EML4:ALK rearragement; chr2:[id number], chr2:[id number]  NGS Guardant- GNAS R201H, KRAS K5E- No ALK    He is s/p GK to 11 briain lesions, will be starting dexamethasone soon that is prescribed by Rad Onc, most likely for the next 2 weeks.  I discussed the overall prognosis for ALK rearrangement lung cancer with a median overall survival > 7 years  based on the most recent update of the WINSOME study.  I discussed the treatment options including alectinib, brigatinib and lorlatinib.  All the 3 drugs are equally efficacious but have not been tested against each other.  When the longer follow-up time I would prefer like to him Alectinib 600 mg BID.  I then discussed the overall outcomes with this drug including a very high CNS response rates and also high response rates even outside of the CNS. I then discussed the common and uncommon side effects of alectinib including transaminitis, CPK elevation, nausea, vomiting , etc.  I also provided written information on the expected side effects.  Of note, patient does not have insurance yet and therefore we are working on getting free drug approval directly from the company.  I suggested starting treatment in about 2 weeks once he is done with the steroids.  He is agreeable.  I discussed that for the first month month and a half, he would need labs every 2 weeks to monitor LFTs and also CPK.  He is agreeable.    Plan  Will work on free drug approval from company (as pt does not have insurance)  Plan to start Alectinib in 2 weks after he is done with steroids  Will need labs prior to starting alectinib  RTC with NP/PA 2 weeks after starting Alectinib  Ct CAP in 2.5 months  RTC with me after 2.5 months        # Brain mets: Brain MRI with several brain mets, s/p GK to 11-12 brain mets. Will start dexamentasone today per rad onc recommendations.   Will need Brain MRI every 3 months      # Pleural effusion: s/p pleurex palcement  2/16/22  Was in pain today following procedure.   -Oxycodone prescribed today for pleuritic pain  Has f/u with IP in 4 weeks      # Cancer related pain-  Was in pain today following procedure.   -Oxycodone prescribed today for pleuritic pain       #Nutrition  Good nutritional status thus far. Counseled on appropriate diet in cancer - balanced diet, with plenty of fresh fruits, vegetables, protein,  less processed carbohydrates. But also counseled on avoiding fad diets during this time.     #Psychiatry  # Situational Anxiety   - Significant Situational anxiety related to his new diagnosis of cancer. Discussed possible initiation of anti-anxiety medications - Connor declined for now, will attempt to cope. Will let us know if he feels like he needs meds for this.     #COVID vaccine: No COVID vaccine on record, did not ask today, will confirm      #Type 2 diabetes mellitus.  Was previously on Metformin, but not taking anything right now, will request f/u with PCP as his BS will worsen on steroids.    #History of hypertension: Not on any medication.  Blood pressure today is elevated, but he also has a lot of associated anxiety.  Asked him to monitor blood pressure at home, would consider adding antihypertensive next time.    On the day of service,  Preparation time:  5 minutes  Visit time:  30 minutes  Care Coordination:  5 minutes      Bassam Persaud M.D.   of Medicine  Division of Hematology, Oncology and Transplantation  [facility]

## 2022-02-15 ENCOUNTER — OFFICE VISIT (OUTPATIENT)
Dept: RADIATION ONCOLOGY | Facility: CLINIC | Age: 42
End: 2022-02-15
Attending: NEUROLOGICAL SURGERY
Payer: MEDICAID

## 2022-02-15 ENCOUNTER — HOSPITAL ENCOUNTER (OUTPATIENT)
Dept: MRI IMAGING | Facility: CLINIC | Age: 42
End: 2022-02-15
Attending: RADIOLOGY
Payer: MEDICAID

## 2022-02-15 ENCOUNTER — PATIENT OUTREACH (OUTPATIENT)
Dept: PULMONOLOGY | Facility: CLINIC | Age: 42
End: 2022-02-15

## 2022-02-15 ENCOUNTER — OFFICE VISIT (OUTPATIENT)
Dept: RADIATION ONCOLOGY | Facility: CLINIC | Age: 42
End: 2022-02-15
Attending: RADIOLOGY
Payer: MEDICAID

## 2022-02-15 VITALS
HEART RATE: 97 BPM | DIASTOLIC BLOOD PRESSURE: 103 MMHG | RESPIRATION RATE: 16 BRPM | SYSTOLIC BLOOD PRESSURE: 155 MMHG | OXYGEN SATURATION: 94 %

## 2022-02-15 VITALS — HEART RATE: 97 BPM | SYSTOLIC BLOOD PRESSURE: 149 MMHG | RESPIRATION RATE: 14 BRPM | DIASTOLIC BLOOD PRESSURE: 96 MMHG

## 2022-02-15 DIAGNOSIS — C34.31 MALIGNANT NEOPLASM OF LOWER LOBE OF RIGHT LUNG (H): ICD-10-CM

## 2022-02-15 DIAGNOSIS — C79.31 METASTASIS TO BRAIN (H): ICD-10-CM

## 2022-02-15 DIAGNOSIS — C79.31 BRAIN METASTASIS: Primary | ICD-10-CM

## 2022-02-15 DIAGNOSIS — C79.31 METASTASIS TO BRAIN (H): Primary | ICD-10-CM

## 2022-02-15 PROCEDURE — 77295 3-D RADIOTHERAPY PLAN: CPT | Mod: 26 | Performed by: RADIOLOGY

## 2022-02-15 PROCEDURE — 255N000002 HC RX 255 OP 636: Performed by: RADIOLOGY

## 2022-02-15 PROCEDURE — A9585 GADOBUTROL INJECTION: HCPCS | Performed by: RADIOLOGY

## 2022-02-15 PROCEDURE — 77295 3-D RADIOTHERAPY PLAN: CPT | Performed by: RADIOLOGY

## 2022-02-15 PROCEDURE — 77432 STEREOTACTIC RADIATION TRMT: CPT | Performed by: RADIOLOGY

## 2022-02-15 PROCEDURE — 77334 RADIATION TREATMENT AID(S): CPT | Performed by: RADIOLOGY

## 2022-02-15 PROCEDURE — 77300 RADIATION THERAPY DOSE PLAN: CPT | Mod: 59

## 2022-02-15 PROCEDURE — 77334 RADIATION TREATMENT AID(S): CPT | Mod: 26 | Performed by: RADIOLOGY

## 2022-02-15 PROCEDURE — 77300 RADIATION THERAPY DOSE PLAN: CPT | Mod: 26 | Performed by: RADIOLOGY

## 2022-02-15 PROCEDURE — 250N000013 HC RX MED GY IP 250 OP 250 PS 637: Performed by: RADIOLOGY

## 2022-02-15 PROCEDURE — 77370 RADIATION PHYSICS CONSULT: CPT | Performed by: RADIOLOGY

## 2022-02-15 PROCEDURE — 250N000009 HC RX 250: Performed by: RADIOLOGY

## 2022-02-15 PROCEDURE — 77371 SRS MULTISOURCE: CPT | Performed by: RADIOLOGY

## 2022-02-15 PROCEDURE — 77263 THER RADIOLOGY TX PLNG CPLX: CPT | Performed by: RADIOLOGY

## 2022-02-15 PROCEDURE — 70552 MRI BRAIN STEM W/DYE: CPT

## 2022-02-15 PROCEDURE — 77300 RADIATION THERAPY DOSE PLAN: CPT | Performed by: RADIOLOGY

## 2022-02-15 PROCEDURE — 70552 MRI BRAIN STEM W/DYE: CPT | Mod: 26 | Performed by: RADIOLOGY

## 2022-02-15 PROCEDURE — 250N000011 HC RX IP 250 OP 636: Performed by: RADIOLOGY

## 2022-02-15 RX ORDER — HYDROCODONE BITARTRATE AND ACETAMINOPHEN 5; 325 MG/1; MG/1
1-2 TABLET ORAL EVERY 6 HOURS PRN
Status: DISCONTINUED | OUTPATIENT
Start: 2022-02-15 | End: 2022-02-15 | Stop reason: HOSPADM

## 2022-02-15 RX ORDER — ONDANSETRON 4 MG/1
8 TABLET, ORALLY DISINTEGRATING ORAL EVERY 6 HOURS PRN
Status: DISCONTINUED | OUTPATIENT
Start: 2022-02-15 | End: 2022-02-15 | Stop reason: HOSPADM

## 2022-02-15 RX ORDER — ACETAMINOPHEN 325 MG/1
650 TABLET ORAL EVERY 4 HOURS PRN
Status: DISCONTINUED | OUTPATIENT
Start: 2022-02-15 | End: 2022-02-15 | Stop reason: HOSPADM

## 2022-02-15 RX ORDER — LORAZEPAM 0.5 MG/1
.5-1 TABLET ORAL
Status: DISCONTINUED | OUTPATIENT
Start: 2022-02-15 | End: 2022-02-15 | Stop reason: HOSPADM

## 2022-02-15 RX ORDER — LIDOCAINE 40 MG/G
1 CREAM TOPICAL SEE ADMIN INSTRUCTIONS
Status: COMPLETED | OUTPATIENT
Start: 2022-02-15 | End: 2022-02-15

## 2022-02-15 RX ORDER — GADOBUTROL 604.72 MG/ML
10 INJECTION INTRAVENOUS ONCE
Status: COMPLETED | OUTPATIENT
Start: 2022-02-15 | End: 2022-02-15

## 2022-02-15 RX ORDER — LORAZEPAM 0.5 MG/1
.5-2 TABLET ORAL
Status: COMPLETED | OUTPATIENT
Start: 2022-02-15 | End: 2022-02-15

## 2022-02-15 RX ORDER — ONDANSETRON 2 MG/ML
4 INJECTION INTRAMUSCULAR; INTRAVENOUS
Status: DISCONTINUED | OUTPATIENT
Start: 2022-02-15 | End: 2022-02-15 | Stop reason: HOSPADM

## 2022-02-15 RX ADMIN — LIDOCAINE 1 G: 40 CREAM TOPICAL at 05:54

## 2022-02-15 RX ADMIN — GADOBUTROL 10 ML: 604.72 INJECTION INTRAVENOUS at 07:21

## 2022-02-15 RX ADMIN — BUPIVACAINE HYDROCHLORIDE 30 ML: 7.5 INJECTION, SOLUTION EPIDURAL; RETROBULBAR at 05:53

## 2022-02-15 RX ADMIN — LORAZEPAM 2 MG: 0.5 TABLET ORAL at 05:54

## 2022-02-15 NOTE — PROGRESS NOTES
Called the patient today to collect medical insurance information in order to set up order with [employer] for Pleurx supplies.     Patient states he does not currently have insurance information.  He is working on getting it and has medical assistance insurance, but doesn't have the information.

## 2022-02-15 NOTE — LETTER
2/15/2022         RE: Connor Emerson  [address]  [place] [address]        Dear Colleague,    Thank you for referring your patient, Connor Emerson, to the [facility] RADIATION ONCOLOGY GAMMA KNIFE. Please see a copy of my visit note below.      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  Date of Treatment: February 15, 2022  Referring Physicians: Bassam Persaud, Tumor Registry    GAMMA KNIFE PROCEDURE NOTE and TREATMENT SUMMARY    Treatment Summary:     Treatment Site Dose Isodose Modality Collimator (mm) Shots   LT Inf Cerebellar 20 GY 60% [place] 60 4mm 1   RT Lat Temporal 20 GY 70% [place] 60 4,8,16mm 5   LT Inf Frontal 22 Gy 80% [place] 60 4,8,16mm 3   RT Occipital 22 Gy 85% [place] 60 4mm 1   LT Ant Frontal 22 Gy 80% [place] 60 4,8,16mm 3   RT Parietal 22 Gy 85% [place] 60 8mm 1   RT Sup Parietal 20 GY 70% [place] 60 8,16mm 8   LT Sup Parietal 22 Gy 80% [place] 60 4,8mm 2   LT Post Cerebellar 18 Gy 55% [place] 60 4,8,16mm 15   RT Ant Frontal 18 Gy 50% [place] 60 4,16mm 8   LT Post Frontal 18 Gy 50% [place] 60 4,8,16mm 26           DESCRIPTION OF PROCEDURE:  On 2/15/2022, Mr. Emerson was brought to the Gamma Knife [facility]  After sedation and topical anesthetic, the head frame was put on by Dr. Moran.    The patient was then taken to the Department of Radiology where a stereotactic brain MRI was performed.  The patient was admitted to the  care area while treatment planning was completed.      These images were then sent to the [employer] Gamma Knife computer system where a three dimensional stereotactic plan was generated.   The patient was then treated on the [employer] Gamma Knife.  Treatment involved 11 targets.    The [employer] Gamma Knife IconTM Plan software was used to create a highly conformal dose distribution using the number and size collimators detailed  above.     TREATMENT:    Mr. Emerson was brought to the Gamma Knife suite.  The patient was identified by 2 methods. A timeout was performed to confirm the correct patient and correct procedure. The site was identified by an MRI image and treatment planning software, which defined the treatment area.     A cone beam CT was done and compared to the MRI to look for frame motion  We evaluated the frame manually to ensure it was still secure.     The treatment was delivered using the [employer] Gamma Knife IconTM without complication.  The head frame was removed and the patient was discharged home in stable condition.  The patient tolerated the treatment well and had no complications.    FOLLOW-UP PLANS:  The Gamma Knife Nurse Coordinator will call the patient tomorrow for short-term follow up.  Written discharge instructions were given to the patient. The patient will have a follow up brain MRI in 3 months.     Approved by:  Annette Arango MD        Again, thank you for allowing me to participate in the care of your patient.        Sincerely,        Annette Arango MD

## 2022-02-15 NOTE — TELEPHONE ENCOUNTER
Faxed Care [employer] form to [employer] along with notes from the last imaging of chest to support the need for the Pleurx.    Spoke with Harvey from [employer] who is processing the claim and will be in touch regarding the coverage.

## 2022-02-15 NOTE — PROGRESS NOTES
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  Date of Treatment: February 15, 2022  Referring Physicians: Bassam Persaud, Tumor Registry    GAMMA KNIFE PROCEDURE NOTE and TREATMENT SUMMARY    Treatment Summary:     Treatment Site Dose Isodose Modality Collimator (mm) Shots   LT Inf Cerebellar 20 GY 60% [place] 60 4mm 1   RT Lat Temporal 20 GY 70% [place] 60 4,8,16mm 5   LT Inf Frontal 22 Gy 80% [place] 60 4,8,16mm 3   RT Occipital 22 Gy 85% [place] 60 4mm 1   LT Ant Frontal 22 Gy 80% [place] 60 4,8,16mm 3   RT Parietal 22 Gy 85% [place] 60 8mm 1   RT Sup Parietal 20 GY 70% [place] 60 8,16mm 8   LT Sup Parietal 22 Gy 80% [place] 60 4,8mm 2   LT Post Cerebellar 18 Gy 55% [place] 60 4,8,16mm 15   RT Ant Frontal 18 Gy 50% [place] 60 4,16mm 8   LT Post Frontal 18 Gy 50% [place] 60 4,8,16mm 26           DESCRIPTION OF PROCEDURE:  On 2/15/2022, Mr. Emerson was brought to the Gamma Knife suite at the [facility].  After sedation and topical anesthetic, the head frame was put on by Dr. Moran.    The patient was then taken to the Department of Radiology where a stereotactic brain MRI was performed.  The patient was admitted to the  care area while treatment planning was completed.      These images were then sent to the [employer] Gamma Knife computer system where a three dimensional stereotactic plan was generated.   The patient was then treated on the [employer] Gamma Knife.  Treatment involved 11 targets.    The [employer] Gamma Knife IconTM Plan software was used to create a highly conformal dose distribution using the number and size collimators detailed above.     TREATMENT:    Mr. Emerson was brought to the Gamma Knife suite.  The patient was identified by 2 methods. A timeout was performed to confirm the correct patient and correct procedure. The site was identified by an MRI image and treatment planning software, which  defined the treatment area.     A cone beam CT was done and compared to the MRI to look for frame motion  We evaluated the frame manually to ensure it was still secure.     The treatment was delivered using the [employer] Gamma Knife [employer] without complication.  The head frame was removed and the patient was discharged home in stable condition.  The patient tolerated the treatment well and had no complications.    FOLLOW-UP PLANS:  The Gamma Knife Nurse Coordinator will call the patient tomorrow for short-term follow up.  Written discharge instructions were given to the patient. The patient will have a follow up brain MRI in 3 months.     Approved by:  Annette Arango MD

## 2022-02-15 NOTE — PATIENT INSTRUCTIONS
DECADRON SCHEDULE    Patient Name: Connor Emerson    Decadron (Dexamethasone) is a drug, a type of steroid, which is often used in patients with brain tumors to reduce swelling in the brain.  It must be taken carefully and cannot be stopped abruptly.  The dose must be gradually reduced or tapered.  The table below provides exact instructions regarding how to take it.      Day Date Total Daily Dose (mg) Number of 4 mg tablets to take at      Breakfast Lunch Supper Bedtime   1-7 2/15/22 thru 2/21/22 12 2 0 1 0   8-15 2/22/22 thru 3/1/22 8 2 0 0 0   16-21 3/2/22 thru 3/7/22 6 1 1/2 0 0 0   22-28 3/8/22 thru 3/14/22 4 1 0 0 0   29-35 3/15/22 thru 3/21/22 2 1/2 0 0 0    3/22/22 0 DONE                                                                                Other Instructions: Call if any problems                                 Take one of the Famotidine (Pepcid) daily while on the dexamethasone      Dr. Stephen Moran

## 2022-02-15 NOTE — PROGRESS NOTES
Patient arrived in [place] s/p head frame placement and MRI at 0745.  Emergency removal kit at bedside.  BP 140s/90s, HR 90s.  Sleepy, oriented x4.  Frame intact, pin sites without drainage. Denies pain.  Taking PO fluids well.  Awaiting breakfast order.  Wife at bedside, supportive.    Addendum : Tolerated breakfast with no c/o nausea.  Ambulated to bathroom with SBA.  Voiding without difficulty.  To Radiation Oncology for treatment at 0930 via cart.  Transported by Gamma Knife RN.

## 2022-02-15 NOTE — LETTER
2/15/2022         RE: Connor Emerson  [address]  [place] [address]        Dear Colleague,    Thank you for referring your patient, Connor Emerson, to the [facility] RADIATION ONCOLOGY GAMMA KNIFE. Please see a copy of my visit note below.    Patient arrived in [facility] s/p head frame placement and MRI at 0745.  Emergency removal kit at bedside.  BP 140s/90s, HR 90s.  Sleepy, oriented x4.  Frame intact, pin sites without drainage. Denies pain.  Taking PO fluids well.  Awaiting breakfast order.  Wife at bedside, supportive.    Addendum : Tolerated breakfast with no c/o nausea.  Ambulated to bathroom with SBA.  Voiding without difficulty.  To Radiation Oncology for treatment at 0930 via cart.  Transported by Gamma Knife RN.      See Dictated Note.        Again, thank you for allowing me to participate in the care of your patient.      Sincerely,    Stephen Moran MD

## 2022-02-15 NOTE — NURSING NOTE
Dr. Stephen Moran put patient back on dexamethasone with a tapering schedule, see detailed taper in chart.  Pt had been put on dexamethasone on 2/2/22, was given a seven day supply of the drug, when it ran out patient stated he called a triage line and was told he did not need to get a refill.  Connor having his Gamma Knife treatment today to 11 lesions in his brain.  Rx for dexamethasone 4mg tablets #60 called in to [facility] off of [facility] in [place].  Directions for the taper gone over with patient and his wife, both voice understanding and two copies of the taper were given to them.  Connor is currently taking one pepcid daily and he was told to continue that while on the dexamethasone.

## 2022-02-16 ENCOUNTER — HOSPITAL ENCOUNTER (OUTPATIENT)
Facility: CLINIC | Age: 42
Discharge: HOME | End: 2022-02-16
Attending: INTERNAL MEDICINE | Admitting: INTERNAL MEDICINE
Payer: MEDICAID

## 2022-02-16 ENCOUNTER — APPOINTMENT (OUTPATIENT)
Dept: GENERAL RADIOLOGY | Facility: CLINIC | Age: 42
End: 2022-02-16
Attending: INTERNAL MEDICINE
Payer: MEDICAID

## 2022-02-16 ENCOUNTER — ONCOLOGY VISIT (OUTPATIENT)
Dept: ONCOLOGY | Facility: CLINIC | Age: 42
End: 2022-02-16
Attending: STUDENT IN AN ORGANIZED HEALTH CARE EDUCATION/TRAINING PROGRAM
Payer: MEDICAID

## 2022-02-16 ENCOUNTER — DOCUMENTATION ONLY (OUTPATIENT)
Dept: ONCOLOGY | Facility: CLINIC | Age: 42
End: 2022-02-16

## 2022-02-16 VITALS
HEIGHT: 69 IN | OXYGEN SATURATION: 96 % | WEIGHT: 211.42 LBS | SYSTOLIC BLOOD PRESSURE: 132 MMHG | BODY MASS INDEX: 31.31 KG/M2 | HEART RATE: 97 BPM | TEMPERATURE: 98.2 F | RESPIRATION RATE: 16 BRPM | DIASTOLIC BLOOD PRESSURE: 84 MMHG

## 2022-02-16 VITALS
BODY MASS INDEX: 31.22 KG/M2 | WEIGHT: 211.42 LBS | OXYGEN SATURATION: 96 % | RESPIRATION RATE: 16 BRPM | TEMPERATURE: 98.2 F | SYSTOLIC BLOOD PRESSURE: 132 MMHG | DIASTOLIC BLOOD PRESSURE: 84 MMHG | HEART RATE: 97 BPM

## 2022-02-16 DIAGNOSIS — C34.31 MALIGNANT NEOPLASM OF LOWER LOBE OF RIGHT LUNG (H): ICD-10-CM

## 2022-02-16 DIAGNOSIS — G89.3 CANCER ASSOCIATED PAIN: Primary | ICD-10-CM

## 2022-02-16 DIAGNOSIS — J90 PLEURAL EFFUSION: Primary | ICD-10-CM

## 2022-02-16 DIAGNOSIS — C79.31 METASTASIS TO BRAIN (H): Primary | ICD-10-CM

## 2022-02-16 LAB
GLUCOSE BLDC GLUCOMTR-MCNC: 303 MG/DL (ref 70–99)
GLUCOSE BLDC GLUCOMTR-MCNC: 355 MG/DL (ref 70–99)
GLUCOSE BLDC GLUCOMTR-MCNC: 397 MG/DL (ref 70–99)
GLUCOSE BLDC GLUCOMTR-MCNC: 428 MG/DL (ref 70–99)

## 2022-02-16 PROCEDURE — 99215 OFFICE O/P EST HI 40 MIN: CPT | Performed by: STUDENT IN AN ORGANIZED HEALTH CARE EDUCATION/TRAINING PROGRAM

## 2022-02-16 PROCEDURE — 71045 X-RAY EXAM CHEST 1 VIEW: CPT | Mod: 26 | Performed by: RADIOLOGY

## 2022-02-16 PROCEDURE — 250N000012 HC RX MED GY IP 250 OP 636 PS 637: Performed by: STUDENT IN AN ORGANIZED HEALTH CARE EDUCATION/TRAINING PROGRAM

## 2022-02-16 PROCEDURE — 32550 INSERT PLEURAL CATH: CPT | Performed by: INTERNAL MEDICINE

## 2022-02-16 PROCEDURE — 82962 GLUCOSE BLOOD TEST: CPT

## 2022-02-16 PROCEDURE — 32551 INSERTION OF CHEST TUBE: CPT | Performed by: INTERNAL MEDICINE

## 2022-02-16 PROCEDURE — 999N000065 XR CHEST PORT 1 VIEW

## 2022-02-16 PROCEDURE — G0463 HOSPITAL OUTPT CLINIC VISIT: HCPCS

## 2022-02-16 RX ORDER — NICOTINE POLACRILEX 4 MG
15-30 LOZENGE BUCCAL
Status: DISCONTINUED | OUTPATIENT
Start: 2022-02-16 | End: 2022-02-16 | Stop reason: HOSPADM

## 2022-02-16 RX ORDER — OXYCODONE HYDROCHLORIDE 5 MG/1
5 TABLET ORAL EVERY 6 HOURS PRN
Qty: 12 TABLET | Refills: 0 | Status: SHIPPED | OUTPATIENT
Start: 2022-02-16 | End: 2022-03-18

## 2022-02-16 RX ORDER — PROCHLORPERAZINE MALEATE 10 MG
10 TABLET ORAL EVERY 6 HOURS PRN
Qty: 30 TABLET | Refills: 2 | Status: SHIPPED | OUTPATIENT
Start: 2022-03-01 | End: 2023-05-17

## 2022-02-16 RX ORDER — DEXTROSE MONOHYDRATE 25 G/50ML
25-50 INJECTION, SOLUTION INTRAVENOUS
Status: DISCONTINUED | OUTPATIENT
Start: 2022-02-16 | End: 2022-02-16 | Stop reason: HOSPADM

## 2022-02-16 RX ADMIN — INSULIN ASPART 3 UNITS: 100 INJECTION, SOLUTION INTRAVENOUS; SUBCUTANEOUS at 14:11

## 2022-02-16 ASSESSMENT — PAIN SCALES - GENERAL: PAINLEVEL: WORST PAIN (10)

## 2022-02-16 NOTE — PROCEDURES
INTERVENTIONAL PULMONOLOGY       Procedure(s):    Chest ultrasound and interpretation (right chest)  Tunnelled pleural catheter placement (right chest)    Indication:  Malignant pleural effusion    Attending of Record:  Dallin Agrawal MD     Interventional Pulmonary Fellow   Kady Abbott MD     Trainees Present:   None     Medications:    None    Sedation Time:   None    Time Out:  Performed    The patient's medical record has been reviewed.  The indication for the procedure was reviewed.  The necessary history and physical examination was performed and reviewed.  The risks, benefits and alternatives of the procedure were discussed with the the patient in detail and he had the opportunity to ask questions.  I discussed in particular the potential complications including risks of minor or life-threatening bleeding and/or infection, respiratory failure, vocal cord trauma / paralysis, pneumothorax, and discomfort. Sedation risks were also discussed including abnormal heart rhythms, low blood pressure, and respiratory failure. All questions were answered to the best of my ability.  Verbal and written informed consent was obtained.  The proposed procedure and the patient's identification were verified prior to the procedure by the physician and the nurse, respiratory therapist.    The patient was assessed for the adequacy for the procedure and to receive medications.   Mental Status:  Alert and oriented x 3  Airway examination:  Class I (Complete visualization of soft palate)  Pulmonary:  Clear to ausculation bilaterally  CV:  RRR, no murmurs or gallops  ASA Grade:  (I)  Normal healthy patient    After clinical evaluation and reviewing the indication, risks, alternatives and benefits of the procedure the patient was deemed to be in satisfactory condition to undergo the procedure.           A Tuberculosis risk assessment was performed:  The patient has no known RISK of Tuberculosis    The procedure was performed in a  negative airflow room: Yes    Maneuvers / Procedure:      Chest ultrasound: The Right side was ultasounded and demonstrated pleural fluid. The diaphragm, heart and lung tissue were clearly visualized. Other findings include complex right pleural effusion  Indwelling pleural catheter insertion: Once the site was marked using US, the pleurX catheter kit was used for the procedure. The patient's right chest was prepped in sterile fashion. A total of 15 1%lidocaine used to anesthetize the area. A finder needle was used to aspirate fluid. The wire was advanced using seldinger technique. A 1cm incision was made approximately 5cm anterior to the wire and at the wire site. The tunneling device was used to make a track towards the wire and insert the chest tube. A dilator was used to enlarge the track, then the peel-away catheter was used to insert the chest tube into the pleural space. The catheter was sutured into place using 2.0silk. A sterile dressing was placed. A total of 100cc was drained     Any disposable equipment was visually inspected and deemed to be intact immediately post procedure.      Relevant Pictures      Recommendations:     -->  Successful chest US and right pleurx placement  -->  Drain daily. Call if no drainage for 2 consecutive days   -->  Clinic in 4 weeks to followup pleurx management   -->  Suture removal in 10-12 days   -->  Has complicated pleural space and may need a more directed pleurx placement if not draining properly       Dallin Agrawal MD, MHA    of Medicine  Interventional Pulmonary  Department of Pulmonary, Allergy, Critical Care and Sleep Medicine   [facility]  Pager: [phone]   Office: [phone]  Email: [email or web address]    Elodia MONAHAN, OCN  Clinical Nurse Specialist  Department of Thoracic Surgery  Office: [phone]  Email: [email or web address]    Sandra Nelson  Interventional Pulmonology Surgery    Office: [phone]  Email: [email or web address]

## 2022-02-16 NOTE — NURSING NOTE
"Oncology Rooming Note    February 16, 2022 5:18 PM   Connor Emerson is a 41 year old male who presents for:    Chief Complaint   Patient presents with     Oncology Clinic Visit     Malignant neoplasm of lower lobe of right lung (H)     Initial Vitals: /84   Pulse 97   Temp 98.2  F (36.8  C) (Oral)   Resp 16   Wt 95.9 kg (211 lb 6.7 oz)   SpO2 96%   BMI 31.22 kg/m   Estimated body mass index is 31.22 kg/m  as calculated from the following:    Height as of an earlier encounter on 2/16/22: 1.753 m (5' 9\").    Weight as of this encounter: 95.9 kg (211 lb 6.7 oz). Body surface area is 2.16 meters squared.  Worst Pain (10) Comment: Data Unavailable   No LMP for male patient.  Allergies reviewed: Yes  Medications reviewed: Yes    Medications: MEDICATION REFILLS NEEDED TODAY. Provider was notified.  Pharmacy name entered into EPIC:    [facility] [place]  [place] - [address]    Clinical concerns: Patient requests pain medication.        Cindi Burnett LPN February 16, 2022 5:19 PM              "

## 2022-02-16 NOTE — OR NURSING
Endo teamed informed of patients npo status. Patient reports eating a small burger at 1100 and drinking water until 1300. MD aware.  MD paged regarding patients blood sugar of 428 and 397 on different fingers. Dr. Morocho called RN and stated she would put in sliding scale insulin orders.    Lillie Mckeon RN

## 2022-02-16 NOTE — OP NOTE
Procedure Date: 02/15/2022    PREOPERATIVE DIAGNOSES:  Metastatic non-small cell lung cancer with brain metastasis.    POSTOPERATIVE DIAGNOSES:  Metastatic non-small cell lung cancer with brain metastasis.    PROCEDURES:     1.  Placement of Leksell stereotactic head frame.  2.  Gamma knife treatment.    ATTENDING SURGEON:  Stepehn Moran MD    ANESTHESIA:  Local anesthetic.    INDICATIONS FOR PROCEDURE:  The patient is a 41-year-old male with newly diagnosed lung cancer, who was noted to have multiple brain metastases.  He was brought to the Gamma Knife suite for treatment of his multiple metastases.    DESCRIPTION OF PROCEDURE:  After obtaining informed consent, the patient was brought to the Gamma Knife suite and correctly identified.  Oral Ativan was administered for head frame placement.  The Leksell stereotactic head frame was then secured to the patient's head using 4 pins after the intended pin sites were prepared in the usual sterile fashion, infiltrated with local anesthetic.  Accurate and secure placement in the frame was verified.  The patient was then transferred to the MRI scanner.  Stereotactic MRI of the brain was obtained.  The images were transferred to the Gamma Knife treatment planning station, and a 3-dimensional treatment plan was generated as follows:  Eleven targets were identified and outlined 3 dimensionally.  Two of the targets were greater than 3 cm in diameter.  The rest of the targets were all under 3 cm in diameter.  We created a treatment plan for each of the targets, and the patient was then placed in the unit and the treatment was carried out as follows:      Target 1 in the left inferior cerebellum was treated to 20 Gy at the 60% isodose line.  A single shot was used to treatment volume of 1.997 mL.    Target 2 in the right lateral temporal region was treated to 20 Gy at the 70% isodose line.  Five shots were used to treatment volume of 2.59 mL.    Target 3 in the left inferior  frontal region was treated to 22 Gy at the 80% isodose line.  Three shots were used to treatment volume of 0.106 mL.    Target 4 in the right occipital region was treated to 22 Gy at the 85% isodose line.  A single shot was used to treatment volume of 0.019 mL.   Target 5 in the left anterior frontal region was treated to 22 Gy at the 80% isodose line.  Three shots were used to treatment volume of 0.143 mL.  Target 6 in the right parietal region was treated to 22 Gy at the 85% isodose line.  A single shot was used to a treatment volume of 0.223 mL.  Target 7 in the right superior parietal region was treated to 20 Gy at the 70% isodose line.  Eight shots were used to treatment volume of 1.326 mL.  Target 8 in the left superior parietal region was treated to 22 Gy to the 80% isodose line.  Two shots were used to treatment volume of 0.252 mL.  Target 9 in the left posterior cerebellum was treated to 18 Gy at the 55% isodose line.  Fifteen shots were used to treatment volume of 5.59 mL.  Target 10 in the right anterior frontal region was treated to 18 Gy at the 50% isodose line.  Eight shots were used to treatment volume of 16.5 mL.    Target 11 in the left posterior frontal region was treated to 18 Gy at the 50% isodose line.  Twenty-six shots were used to treatment volume of 13.57 mL.    At the completion of treatment, the patient was taken out of the treatment unit and the stereotactic head frame removed.  The pin sites were inspected for integrity.  A sterile dressing was applied.  The patient was given discharge instructions and a plan for followup.  No immediate complications were noted.      Dr. Moran was present for the key neurosurgical portions of the procedure and immediately available throughout the entire procedure.    Stephen Moran MD        D: 2022   T: 2022   MT: JI    Name:     CAMILA CORREIA  MRN:      [id number]        Account:        [id number]   :      1980            Procedure Date: 02/15/2022     Document: [id number]

## 2022-02-16 NOTE — LETTER
2/16/2022         RE: Connor Emerson  [address]  [place] [place] [facility] [place] [address]        Dear Colleague,    Thank you for referring your patient, Connor Emerson, to the [facility]. Please see a copy of my visit note below.    MEDICAL ONCOLOGY FOLLOW UP NOTE    PATIENT NAME: Connor Emerson  ENCOUNTER DATE: 2/16/2022    Care Team  Primary Oncologist: Bassam Persaud MD    REASON FOR CURRENT VISIT: F/u of lung cancer    HISTORY OF PRESENT ILLNESS:  Mr. Connor Emerson is a 41 year old  male who is a non-smoker with PMHx of T2DM, HTN with newly diagnosed metastatic NSCLC comes for evaluation    Oncologic Hx:    Diagnosis:   Stage IV NSCLC, Rt lung adenocarcinoma with metastasis to pleura, mediastinum , rt pleural effusion and brain diagnosed 1/2022 (AJCC 8th edition)  PD-L1 TPS 2-3% by [place]   NGS [facility] panel-EML4:ALK rearragement  NGS [facility]ant- GNAS R201H, KRAS K5E- No ALK    Treatment:   2/15/22- GK to 11 briain lesions  Anticipating Alectinib 600 mg BID    Intent of treatment: Palliative    Oncologic course:  1/19/22 to 1/22/22-Admitted to [facility] for 2 week progressive SOB secondary to have large rt sided pleural effusion, needing thoracentesis x2 (1.7L and 2.0 L removed), cytology positive for malignancy, adenocarcinoma.   1/26/22- Rt pleural mass biopsy-Dr. Agrawal--POSITIVE FOR ADENOCARCINOMA CONSISTENT WITH LUNG PRIMARY, admixed with mesothelial hyperplasia and inflammatory infiltrate (+ TTF-1 and CK 7;  negative  p40, calretinin and WT-1. PAX8 immunostain focal +). 4th thoracentesis done simultaneously - 3L approx removed.   2/1/22- PET/CT-Right lower lobe central infiltrative FDG avid 8.2 x 9.6 cm mass representing a primary lung adenocarcinoma. Ipsilateral right perihilar, bilateral pretracheal, subcarinal and superior mediastinal michele metastases. Contralateral mildly FDG avid few lung nodules are suspicious for contralateral metastasis. At least 3 intracranial metastases in the  right frontal lobe, left frontal lobe and left cerebellar hemisphere. Nonspecific mild diffuse bone marrow uptake. Further evaluation with a spine MRI could be considered to rule out early marrow infiltration. This could also be seen with red marrow conversion.  2/5/22-  Brain MRI- At least 9 intracranial metastases as detailed above. The dominant lesions involving the orbital right frontal lobe, the posterior left middle frontal gyrus, anterior right temporal lobe and in the left cerebellar hemisphere have surrounding moderate vasogenic type edema.  2/15/22- Saw Dr. Arango from [facility]- Rcd GK to 12 lesion in bran  2/16/22- Pleurex placement       Interval Hx:  Patient is here today with his wife Kylie. His father with was also on speaker phone during this encounter.   He got pleurex placement today and also finished GK treatment yesterday.   He is c/o headaches today and rt chest pain following the pleurex.  He has no new symptoms since last time I saw him.  He he did endorse some anxiety since her last visit.  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       REVIEW OF SYSTEMS: 14 point ROS negative other than the symptoms noted above in the HPI.    Wt Readings from Last 4 Encounters:   02/14/22 96.6 kg (213 lb)   02/02/22 100.7 kg (222 lb)   01/26/22 103.2 kg (227 lb 8.2 oz)   01/25/22 103.6 kg (228 lb 4.8 oz)          Review of Systems:  A comprehensive ROS was performed and found to be negative or non-contributory with the exception of that noted in the HPI above.    Past Medical History:  GERD  Hypertension, not on medication  Type 2 diabetes mellitus, not on medications currently, previously on Metformin        Past Surgical History:  Past Surgical History:   Procedure Laterality Date     BRONCHOSCOPY RIGID OR FLEXIBLE W/TRANSENDOSCOPIC ENDOBRONCHIAL ULTRASOUND GUIDED Bilateral 1/26/2022    Procedure: Right  BRONCHOSCOPY, FIBEROPTIC, endobronchial ultrasound, pleural biopsy;  Surgeon: Dallin Agrawal MD;  Location:  OR     INJECT BLOCK MEDIAL BRANCH CERVICAL/THORACIC/LUMBAR       ORTHOPEDIC SURGERY      Ganesh. Rotator cuff repair.     PLEUROSCOPY N/A 2022    Procedure: Pleuroscopy with Pleural Biopsy;  Surgeon: Dallin Agrawal MD;  Location:  OR       Social History:  Lives with wife and 4 kids in [place]. Works as a  for an apartment complex in [place]. Exposure to household chemicals and . No significant exposure to asbestos. No signal exposure to benzene or similar chemicals. No significant smoking history-states that he smoked 1 to 2 cigarettes occasionally per month for about 2 years in college, non-smoking since then. No significant alcohol use history. No other recreational substances. Good support system. Kids are 23, 19, 17 and 13.    Family History  Significant history for cancers on maternal side. Mother  of uterine cancer. 2 maternal uncles have possible metastatic melanoma.    Outpatient Medications:  Not currently taking any outpatient medications. Has been prescribed Metformin in the past.    Physical Exam:    Blood pressure 132/84, pulse 97, temperature 98.2  F (36.8  C), temperature source Oral, resp. rate 16, weight 95.9 kg (211 lb 6.7 oz), SpO2 96 %.     General: alert and cooperative, sitting up in chair, appears very anxious.   HEENT: sclera anicteric, EOMI, MMM. Pupils equal and reactive.   Neck: supple, normal ROM, no lymph nodes palpable.   CV: RRR, no murmurs  Resp: Right chest Pleurx catheter  GI: soft, non-tender, non-distended, bowel sounds present and normoactive  MSK: warm and well-perfused, normal tone  Skin: no rashes on limited exam, no jaundice  Neuro: Alert awake and oriented x4, strength is 5 on 5 throughout, sensations are grossly intact    Labs & Studies: I personally reviewed the following studies:    Recent Labs   Lab Test  01/20/22  0609 01/19/22 2006 04/27/17  1013 08/20/16  1950 06/01/16  1417   WBC 7.8 10.1 9.3 12.1* 10.2   RBC 4.67 5.36 4.88 5.25 5.07   HGB 14.0 15.9 15.2 16.4 15.7   HCT 43.4 49.4 43.8 47.3 45.8   MCV 93 92 90 90 90   MCH 30.0 29.7 31.1 31.2 31.0   MCHC 32.3 32.2 34.7 34.7 34.3   RDW 12.1 12.1 12.9 12.9 13.0    381 187 245 256   NEUTROPHIL  --  73  --  64.5 70.3     Recent Labs   Lab Test 01/26/22  1147 01/26/22  1110 01/26/22  1044 01/21/22  0758 01/21/22  0622 01/20/22  1309 01/20/22  0609 01/20/22  0041 01/19/22 2006   NA  --   --   --   --  140  --  141  --  137   POTASSIUM  --   --   --   --  3.7  --  3.8  --  4.0   CHLORIDE  --   --   --   --  108  --  109  --  104   CO2  --   --   --   --  28  --  29  --  30   ANIONGAP  --   --   --   --  4  --  3  --  3   * 256* 238*   < > 236*   < > 239*   < > 399*   BUN  --   --   --   --  12  --  12  --  14   CR  --   --   --   --  0.52*  --  0.52*  --  0.56*   TORY  --   --   --   --  8.6  --  8.2*  --  9.1    < > = values in this interval not displayed.     Recent Labs   Lab Test 01/20/22  0609 01/19/22  2006 08/10/18  1110   PROTTOTAL 5.7* 7.0  7.2 6.8   ALBUMIN 2.2* 3.0* 4.1   BILITOTAL 0.3 0.5 0.7   ALKPHOS 69 87 79   AST 8 12 19   ALT 18 24 28       ASSESSMENT AND PLAN:  Connor Corrales is a 41-year-old male non-smoker with a past medical history of hypertension and diabetes who presented with several weeks of dyspnea and found to have right-sided pleural effusion and a large right lung hilar mass, status post bronchoscopy and EBUS on 1/26/2022, with tissue sampling demonstrating adenocarcinoma on histopathological analysis. Mr. Emerson is here to establish care with medical oncology.    Stage IV NSCLC, Rt lung adenocarcinoma with metastasis to pleura, mediastinum , rt pleural effusion and brain diagnosed 1/2022 (AJCC 8th edition)  PD-L1 TPS 2-3% by [place]   NGS [facility] panel-EML4:ALK rearragement; chr2:[id number], chr2:[id number]  NGS Guardant- GNAS  R201H, KRAS K5E- No ALK    He is s/p GK to 11 briain lesions, will be starting dexamethasone soon that is prescribed by Rad Onc, most likely for the next 2 weeks.  I discussed the overall prognosis for ALK rearrangement lung cancer with a median overall survival > 7 years based on the most recent update of the WINSOME study.  I discussed the treatment options including alectinib, brigatinib and lorlatinib.  All the 3 drugs are equally efficacious but have not been tested against each other.  When the longer follow-up time I would prefer like to him Alectinib 600 mg BID.  I then discussed the overall outcomes with this drug including a very high CNS response rates and also high response rates even outside of the CNS. I then discussed the common and uncommon side effects of alectinib including transaminitis, CPK elevation, nausea, vomiting , etc.  I also provided written information on the expected side effects.  Of note, patient does not have insurance yet and therefore we are working on getting free drug approval directly from the company.  I suggested starting treatment in about 2 weeks once he is done with the steroids.  He is agreeable.  I discussed that for the first month month and a half, he would need labs every 2 weeks to monitor LFTs and also CPK.  He is agreeable.    Plan  Will work on free drug approval from company (as pt does not have insurance)  Plan to start Alectinib in 2 weks after he is done with steroids  Will need labs prior to starting alectinib  RTC with NP/PA 2 weeks after starting Alectinib  Ct CAP in 2.5 months  RTC with me after 2.5 months        # Brain mets: Brain MRI with several brain mets, s/p GK to 11-12 brain mets. Will start dexamentasone today per rad onc recommendations.   Will need Brain MRI every 3 months      # Pleural effusion: s/p pleurex palcement  2/16/22  Was in pain today following procedure.   -Oxycodone prescribed today for pleuritic pain  Has f/u with IP in 4 weeks      #  Cancer related pain-  Was in pain today following procedure.   -Oxycodone prescribed today for pleuritic pain       #Nutrition  Good nutritional status thus far. Counseled on appropriate diet in cancer - balanced diet, with plenty of fresh fruits, vegetables, protein, less processed carbohydrates. But also counseled on avoiding fad diets during this time.     #Psychiatry  # Situational Anxiety   - Significant Situational anxiety related to his new diagnosis of cancer. Discussed possible initiation of anti-anxiety medications - Connor declined for now, will attempt to cope. Will let us know if he feels like he needs meds for this.     #COVID vaccine: No COVID vaccine on record, did not ask today, will confirm      #Type 2 diabetes mellitus.  Was previously on Metformin, but not taking anything right now, will request f/u with PCP as his BS will worsen on steroids.    #History of hypertension: Not on any medication.  Blood pressure today is elevated, but he also has a lot of associated anxiety.  Asked him to monitor blood pressure at home, would consider adding antihypertensive next time.    On the day of service,  Preparation time:  5 minutes  Visit time:  30 minutes  Care Coordination:  5 minutes          Again, thank you for allowing me to participate in the care of your patient.      Sincerely,    Bassam Persaud MD

## 2022-02-16 NOTE — OR NURSING
Patient underwent pleurex insertion with 100 mL drainage, local anesthetic only, patient tolerated.  Blood glucose rechecked intra procedure following insulin administration. BG was 355 on recheck at 1500, 4 units given subcutaneous via verbal order from Dr. Agrawal at 1511. Encouraged by Dr. Agrawal to follow up with PCP in regards to elevated blood glucose levels. Transferred to  and report given to recovery RN.

## 2022-02-17 ENCOUNTER — TELEPHONE (OUTPATIENT)
Dept: RADIATION ONCOLOGY | Facility: CLINIC | Age: 42
End: 2022-02-17
Payer: MEDICAID

## 2022-02-17 ENCOUNTER — TELEPHONE (OUTPATIENT)
Dept: ONCOLOGY | Facility: CLINIC | Age: 42
End: 2022-02-17
Payer: MEDICAID

## 2022-02-17 ENCOUNTER — TELEPHONE (OUTPATIENT)
Dept: FAMILY MEDICINE | Facility: CLINIC | Age: 42
End: 2022-02-17
Payer: MEDICAID

## 2022-02-17 ENCOUNTER — PATIENT OUTREACH (OUTPATIENT)
Dept: PULMONOLOGY | Facility: CLINIC | Age: 42
End: 2022-02-17
Payer: MEDICAID

## 2022-02-17 DIAGNOSIS — Z79.899 ENCOUNTER FOR LONG-TERM (CURRENT) USE OF MEDICATIONS: Primary | ICD-10-CM

## 2022-02-17 DIAGNOSIS — C34.31 MALIGNANT NEOPLASM OF LOWER LOBE OF RIGHT LUNG (H): ICD-10-CM

## 2022-02-17 NOTE — TELEPHONE ENCOUNTER
Patient calling stating he was recently diagnosed with Brain Cancer, Lung Cancer and Cancer in Lymph nodes and was advised by his oncologist that he needs to see his PCP ASAP and get his Diabetes under control.  He states that he has been really watching what he eats, but admits that he has not been taking any medications or watching his blood sugars.  He states that when they checked his blood sugars the other day they were 428, 301 and 258.  He is currently on Dexamthasone as well for his Cancer.  Requesting an appointment with Radha as she knows him and his wife and would like to only see her.  Please review your schedule and advise when you can see him.    Call patient back at [phone]    [facility]    Hilary Ahumada RN

## 2022-02-17 NOTE — TELEPHONE ENCOUNTER
Faxed [employer] patient assistance program completed form along with a copy of the patient's 's license and income statement.      Email sent to Giselle in the patient assistance program to verify that all steps have been taken to get the patient's supplies covered.

## 2022-02-17 NOTE — TELEPHONE ENCOUNTER
A telephone voice message was left for Connor on 2/16/22 in follow up to his Gamma Knife treatment on 2/15/22.  I tried Connor again today 2/17/22 and no answer.

## 2022-02-17 NOTE — TELEPHONE ENCOUNTER
Pt does not currently have active insurance.    Free Drug Application Initiated  Medication: Alecensa  Sponsor: "[employer] Pt Assistance Foundation  Phone # [phone]  Fax #[phone]  Additional Information : LM for pt on 2/16 and 2/17 to get consent to fill out his portion, sign on his behalf and need household income    Dr. Persaud signed HCP portion of bravo and I have routed this to the [facility] pool for review, when reviewed,will send application in to "[employer].

## 2022-02-18 PROBLEM — C79.31 BRAIN METASTASIS: Status: ACTIVE | Noted: 2022-02-18

## 2022-02-18 NOTE — TELEPHONE ENCOUNTER
Free drug bravo faxed in 2/18 @12:58pm-- HCP portion only, cannot reach pt. Left patient three messages and sent MyChart message.

## 2022-02-21 DIAGNOSIS — J90 PLEURAL EFFUSION: Primary | ICD-10-CM

## 2022-02-22 ENCOUNTER — MYC MEDICAL ADVICE (OUTPATIENT)
Dept: ONCOLOGY | Facility: CLINIC | Age: 42
End: 2022-02-22
Payer: MEDICAID

## 2022-02-22 DIAGNOSIS — C34.31 MALIGNANT NEOPLASM OF LOWER LOBE OF RIGHT LUNG (H): Primary | ICD-10-CM

## 2022-02-23 ENCOUNTER — TELEPHONE (OUTPATIENT)
Dept: ONCOLOGY | Facility: CLINIC | Age: 42
End: 2022-02-23
Payer: MEDICAID

## 2022-02-23 ENCOUNTER — PATIENT OUTREACH (OUTPATIENT)
Dept: ONCOLOGY | Facility: CLINIC | Age: 42
End: 2022-02-23
Payer: MEDICAID

## 2022-02-23 DIAGNOSIS — R06.6 HICCUPS: ICD-10-CM

## 2022-02-23 DIAGNOSIS — B37.0 THRUSH: Primary | ICD-10-CM

## 2022-02-23 RX ORDER — METOCLOPRAMIDE 5 MG/1
5 TABLET ORAL 3 TIMES DAILY PRN
Qty: 30 TABLET | Refills: 1 | Status: SHIPPED | OUTPATIENT
Start: 2022-02-23 | End: 2022-07-07

## 2022-02-23 RX ORDER — NYSTATIN 100000/ML
200000 SUSPENSION, ORAL (FINAL DOSE FORM) ORAL 4 TIMES DAILY
Qty: 60 ML | Refills: 1 | Status: SHIPPED | OUTPATIENT
Start: 2022-02-23 | End: 2022-07-07

## 2022-02-23 NOTE — TELEPHONE ENCOUNTER
"Oral Chemotherapy Monitoring Program    Lab Monitoring Plan  CPK Q2 weeks for the first 6 weeks, CMP Q2 weeks for the first 2 months, then monthly, Check BP & HR monthly  Labs drawn outside of [place]: no  Subjective/Objective:  Connor Emerson is a 41 year old male contacted by phone for an initial visit for oral chemotherapy education.  Pt was told by Dr. Persaud to wait until he was done with his steroids (1 more week) before starting his chemo. Pt plans on starting on 3/2/22.     ORAL CHEMOTHERAPY 2/16/2022 2/17/2022 2/23/2022   Assessment Type Initial Work up Lab Monitoring;Other;Chart Review New Teach   Diagnosis Code Non-Small Cell Lung Cancer Non-Small Cell Lung Cancer Non-Small Cell Lung Cancer   Providers Dr. Cori Persaud   [facility]   Drug Name Alecensa (alectinib) Alecensa (alectinib) Alecensa (alectinib)   Dose 600 mg 600 mg 600 mg   Current Schedule BID BID BID   Cycle Details Continuous Continuous Continuous   Any new drug interactions? No - -   Is the dose as ordered appropriate for the patient? Yes - -       Last PHQ-2 Score on record:   PHQ-2 ( 1999 Pfizer) 1/25/2022 5/12/2020   Q1: Little interest or pleasure in doing things 0 0   Q2: Feeling down, depressed or hopeless 0 0   PHQ-2 Score 0 0   PHQ-2 Total Score (12-17 Years)- Positive if 3 or more points; Administer PHQ-A if positive - 0       Vitals:  BP:   BP Readings from Last 1 Encounters:   02/16/22 132/84     Wt Readings from Last 1 Encounters:   02/16/22 95.9 kg (211 lb 6.7 oz)     Estimated body surface area is 2.16 meters squared as calculated from the following:    Height as of 2/16/22: 1.753 m (5' 9\").    Weight as of 2/16/22: 95.9 kg (211 lb 6.7 oz).    Labs:  No results found for NA within last 30 days.     No results found for K within last 30 days.     No results found for CA within last 30 days.     No results found for Mag within last 30 days.     No results found for Phos " within last 30 days.     No results found for ALBUMIN within last 30 days.     No results found for BUN within last 30 days.     No results found for CR within last 30 days.     No results found for AST within last 30 days.     No results found for ALT within last 30 days.     No results found for BILITOTAL within last 30 days.     No results found for WBC within last 30 days.     No results found for HGB within last 30 days.     No results found for PLT within last 30 days.     No results found for ANC within last 30 days.       Assessment:  Patient is appropriate to start therapy.    Plan:  Basic chemotherapy teaching was reviewed with the patient including indication, start date of therapy, dose, administration, adverse effects, missed doses, food and drug interactions, monitoring, side effect management, office contact information, and safe handling. Written materials were provided and all questions answered.    Follow-Up:  1 week following start (pt planning on starting after he is done with his steroid, on 3/2/22)    Mai Aguilar, PharmD, BCACP  Oral Chemotherapy Monitoring Program  [facility]  [phone]  February 23, 2022

## 2022-02-23 NOTE — TELEPHONE ENCOUNTER
The following information was communicated by phone and MyChart to  Connor .    When you receive your medication from [facility], do not start taking it until you have spoken with one of our oral chemotherpy pharmacists.  They can be reached by calling [phone].    Juliette Marquez CPhT  [facility]  Oncology Pharmacy Liaison   [email or web address]  [phone] (phone  [phone] (fax

## 2022-02-23 NOTE — TELEPHONE ENCOUNTER
[facility] Triage - [facility], has not started chemo, lung cancer with brain mets.    MyCart Message: Hiccups throughout the night,  fatigue and down 35 lbs in last 2.5 weeks.    Voiding and Stooling  Case of water a day minimum is what he is drinking, always thirsty -   Still feeling dehydrated, lips cracked, hands cracked  Eating a lot of food,   No fever  No headache, no dizziness  No CP  No SOB  Heartburn - taking pepcid which does not help  Drain pain which is normal and Dr. Agrawal is working with him on that.    Paging Dr. Persaud to advise on medication for hiccups and insatiable thirst, and weight loss. 1201 pm    Routing to Dr. Persaud and Jamari Ya    FOLLOW UP:  Dr. Persaud returned page - reglan for hiccups, BMP for a lab, nystatin for thrush (RX have been sent) insatiable thirst and fatigue can be from radiation or lung cancer.  Continue to to eat and drink, let us know if that changes.    Spoke to Connor and let him know the above follow up from Dr. Persaud, that I placed the lab order and he can make appt at a [facility] clinic that is closest to him and to call back with any questions.  Patient happy with this plan.    Routing to Dr. Ct Ya  Routing to Dr. Persaud and Jamari Chisholm

## 2022-02-24 ENCOUNTER — HOSPITAL ENCOUNTER (EMERGENCY)
Facility: CLINIC | Age: 42
Discharge: HOME | End: 2022-02-24
Attending: EMERGENCY MEDICINE | Admitting: EMERGENCY MEDICINE
Payer: MEDICAID

## 2022-02-24 ENCOUNTER — APPOINTMENT (OUTPATIENT)
Dept: CT IMAGING | Facility: CLINIC | Age: 42
End: 2022-02-24
Attending: EMERGENCY MEDICINE
Payer: MEDICAID

## 2022-02-24 ENCOUNTER — APPOINTMENT (OUTPATIENT)
Dept: GENERAL RADIOLOGY | Facility: CLINIC | Age: 42
End: 2022-02-24
Attending: EMERGENCY MEDICINE
Payer: MEDICAID

## 2022-02-24 ENCOUNTER — OFFICE VISIT (OUTPATIENT)
Dept: FAMILY MEDICINE | Facility: CLINIC | Age: 42
End: 2022-02-24
Payer: MEDICAID

## 2022-02-24 VITALS
DIASTOLIC BLOOD PRESSURE: 80 MMHG | HEART RATE: 111 BPM | BODY MASS INDEX: 30.57 KG/M2 | WEIGHT: 207 LBS | TEMPERATURE: 97.7 F | SYSTOLIC BLOOD PRESSURE: 120 MMHG | OXYGEN SATURATION: 97 % | RESPIRATION RATE: 20 BRPM

## 2022-02-24 VITALS
DIASTOLIC BLOOD PRESSURE: 89 MMHG | OXYGEN SATURATION: 97 % | HEART RATE: 103 BPM | SYSTOLIC BLOOD PRESSURE: 166 MMHG | RESPIRATION RATE: 20 BRPM | TEMPERATURE: 97.6 F

## 2022-02-24 DIAGNOSIS — R06.00 DYSPNEA, UNSPECIFIED TYPE: ICD-10-CM

## 2022-02-24 DIAGNOSIS — Z79.899 ENCOUNTER FOR LONG-TERM (CURRENT) USE OF MEDICATIONS: ICD-10-CM

## 2022-02-24 DIAGNOSIS — C34.31 MALIGNANT NEOPLASM OF LOWER LOBE OF RIGHT LUNG (H): ICD-10-CM

## 2022-02-24 DIAGNOSIS — Z79.4 TYPE 2 DIABETES MELLITUS WITH HYPERGLYCEMIA, WITH LONG-TERM CURRENT USE OF INSULIN (H): Primary | ICD-10-CM

## 2022-02-24 DIAGNOSIS — E11.65 TYPE 2 DIABETES MELLITUS WITH HYPERGLYCEMIA, WITH LONG-TERM CURRENT USE OF INSULIN (H): Primary | ICD-10-CM

## 2022-02-24 LAB
ANION GAP SERPL CALCULATED.3IONS-SCNC: 6 MMOL/L (ref 3–14)
BASOPHILS # BLD AUTO: 0 10E3/UL (ref 0–0.2)
BASOPHILS NFR BLD AUTO: 0 %
BUN SERPL-MCNC: 23 MG/DL (ref 7–30)
CALCIUM SERPL-MCNC: 8.4 MG/DL (ref 8.5–10.1)
CHLORIDE BLD-SCNC: 100 MMOL/L (ref 94–109)
CO2 SERPL-SCNC: 27 MMOL/L (ref 20–32)
CREAT SERPL-MCNC: 0.49 MG/DL (ref 0.66–1.25)
D DIMER PPP FEU-MCNC: 0.84 UG/ML FEU (ref 0–0.5)
EOSINOPHIL # BLD AUTO: 0.5 10E3/UL (ref 0–0.7)
EOSINOPHIL NFR BLD AUTO: 4 %
ERYTHROCYTE [DISTWIDTH] IN BLOOD BY AUTOMATED COUNT: 13.4 % (ref 10–15)
GFR SERPL CREATININE-BSD FRML MDRD: >90 ML/MIN/1.73M2
GLUCOSE BLD-MCNC: 434 MG/DL (ref 70–99)
GLUCOSE BLDC GLUCOMTR-MCNC: 360 MG/DL (ref 70–99)
HCT VFR BLD AUTO: 46.1 % (ref 40–53)
HGB BLD-MCNC: 15.7 G/DL (ref 13.3–17.7)
LYMPHOCYTES # BLD AUTO: 1.9 10E3/UL (ref 0.8–5.3)
LYMPHOCYTES NFR BLD AUTO: 15 %
MCH RBC QN AUTO: 30 PG (ref 26.5–33)
MCHC RBC AUTO-ENTMCNC: 34.1 G/DL (ref 31.5–36.5)
MCV RBC AUTO: 88 FL (ref 78–100)
MONOCYTES # BLD AUTO: 1 10E3/UL (ref 0–1.3)
MONOCYTES NFR BLD AUTO: 8 %
NEUTROPHILS # BLD AUTO: 9.6 10E3/UL (ref 1.6–8.3)
NEUTROPHILS NFR BLD AUTO: 73 %
PLATELET # BLD AUTO: 255 10E3/UL (ref 150–450)
POTASSIUM BLD-SCNC: 3.8 MMOL/L (ref 3.4–5.3)
RBC # BLD AUTO: 5.24 10E6/UL (ref 4.4–5.9)
SODIUM SERPL-SCNC: 133 MMOL/L (ref 133–144)
TROPONIN I SERPL HS-MCNC: 6 NG/L
WBC # BLD AUTO: 13.1 10E3/UL (ref 4–11)

## 2022-02-24 PROCEDURE — 96374 THER/PROPH/DIAG INJ IV PUSH: CPT | Mod: 59

## 2022-02-24 PROCEDURE — 82310 ASSAY OF CALCIUM: CPT | Performed by: EMERGENCY MEDICINE

## 2022-02-24 PROCEDURE — 93005 ELECTROCARDIOGRAM TRACING: CPT

## 2022-02-24 PROCEDURE — 36415 COLL VENOUS BLD VENIPUNCTURE: CPT | Performed by: EMERGENCY MEDICINE

## 2022-02-24 PROCEDURE — 99285 EMERGENCY DEPT VISIT HI MDM: CPT | Mod: 25

## 2022-02-24 PROCEDURE — 82550 ASSAY OF CK (CPK): CPT | Performed by: NURSE PRACTITIONER

## 2022-02-24 PROCEDURE — 80061 LIPID PANEL: CPT | Performed by: NURSE PRACTITIONER

## 2022-02-24 PROCEDURE — 80048 BASIC METABOLIC PNL TOTAL CA: CPT | Performed by: NURSE PRACTITIONER

## 2022-02-24 PROCEDURE — 85379 FIBRIN DEGRADATION QUANT: CPT | Performed by: EMERGENCY MEDICINE

## 2022-02-24 PROCEDURE — 74177 CT ABD & PELVIS W/CONTRAST: CPT

## 2022-02-24 PROCEDURE — 36415 COLL VENOUS BLD VENIPUNCTURE: CPT | Performed by: NURSE PRACTITIONER

## 2022-02-24 PROCEDURE — 250N000009 HC RX 250: Performed by: EMERGENCY MEDICINE

## 2022-02-24 PROCEDURE — 99215 OFFICE O/P EST HI 40 MIN: CPT | Performed by: NURSE PRACTITIONER

## 2022-02-24 PROCEDURE — 85025 COMPLETE CBC W/AUTO DIFF WBC: CPT | Performed by: NURSE PRACTITIONER

## 2022-02-24 PROCEDURE — 250N000013 HC RX MED GY IP 250 OP 250 PS 637: Performed by: EMERGENCY MEDICINE

## 2022-02-24 PROCEDURE — 258N000003 HC RX IP 258 OP 636: Performed by: EMERGENCY MEDICINE

## 2022-02-24 PROCEDURE — 71046 X-RAY EXAM CHEST 2 VIEWS: CPT

## 2022-02-24 PROCEDURE — 84484 ASSAY OF TROPONIN QUANT: CPT | Performed by: EMERGENCY MEDICINE

## 2022-02-24 PROCEDURE — 96361 HYDRATE IV INFUSION ADD-ON: CPT

## 2022-02-24 PROCEDURE — 250N000011 HC RX IP 250 OP 636: Performed by: EMERGENCY MEDICINE

## 2022-02-24 PROCEDURE — 82043 UR ALBUMIN QUANTITATIVE: CPT | Performed by: NURSE PRACTITIONER

## 2022-02-24 RX ORDER — GLUCOSAMINE HCL/CHONDROITIN SU 500-400 MG
CAPSULE ORAL
Qty: 100 EACH | Refills: 3 | Status: SHIPPED | OUTPATIENT
Start: 2022-02-24 | End: 2023-07-13

## 2022-02-24 RX ORDER — LANCETS
EACH MISCELLANEOUS
Qty: 100 EACH | Refills: 6 | Status: SHIPPED | OUTPATIENT
Start: 2022-02-24 | End: 2023-05-17

## 2022-02-24 RX ORDER — IOPAMIDOL 755 MG/ML
500 INJECTION, SOLUTION INTRAVASCULAR ONCE
Status: COMPLETED | OUTPATIENT
Start: 2022-02-24 | End: 2022-02-24

## 2022-02-24 RX ADMIN — IOPAMIDOL 85 ML: 755 INJECTION, SOLUTION INTRAVENOUS at 16:21

## 2022-02-24 RX ADMIN — SODIUM CHLORIDE 500 ML: 9 INJECTION, SOLUTION INTRAVENOUS at 15:20

## 2022-02-24 RX ADMIN — INSULIN HUMAN 9.39 UNITS: 100 INJECTION, SOLUTION PARENTERAL at 16:09

## 2022-02-24 RX ADMIN — SODIUM CHLORIDE 85 ML: 9 INJECTION, SOLUTION INTRAVENOUS at 16:21

## 2022-02-24 ASSESSMENT — PAIN SCALES - GENERAL: PAINLEVEL: EXTREME PAIN (8)

## 2022-02-24 ASSESSMENT — ENCOUNTER SYMPTOMS
SHORTNESS OF BREATH: 1
WEAKNESS: 1
VOMITING: 1

## 2022-02-24 ASSESSMENT — ASTHMA QUESTIONNAIRES: ACT_TOTALSCORE: 18

## 2022-02-24 NOTE — PROGRESS NOTES
"  Assessment & Plan     Type 2 diabetes mellitus with hyperglycemia, with long-term current use of insulin (H)  Will need to start management with insulin.  Discussed titration of lantus, but will complete this after ED visit and eating has resumed.    - Lipid panel reflex to direct LDL Non-fasting; Future  - Albumin Random Urine Quantitative with Creat Ratio; Future  - blood glucose monitoring (NO BRAND SPECIFIED) meter device kit; Use to test blood sugar 2 times daily or as directed. Preferred blood glucose meter OR supplies to accompany: Blood Glucose Monitor Brands: per insurance.  - blood glucose (NO BRAND SPECIFIED) test strip; Use to test blood sugar 2 times daily or as directed. To accompany: Blood Glucose Monitor Brands: per insurance.  - blood glucose calibration (NO BRAND SPECIFIED) solution; To accompany: Blood Glucose Monitor Brands: per insurance.  - thin (NO BRAND SPECIFIED) lancets; Use with lanceting device. To accompany: Blood Glucose Monitor Brands: per insurance.  - alcohol swab prep pads; Use to swab area of injection/jabier as directed.  - insulin glargine (LANTUS PEN) 100 UNIT/ML pen; Inject 10 Units Subcutaneous At Bedtime  - Lipid panel reflex to direct LDL Non-fasting  - Albumin Random Urine Quantitative with Creat Ratio    Malignant neoplasm of lower lobe of right lung (H)  Working with oncology.  - Comprehensive metabolic panel  - CBC with Platelets & Differential  - CK total    Encounter for long-term (current) use of medications  - Comprehensive metabolic panel  - CBC with Platelets & Differential  - CK total    SOB  Concern regarding new onset/worsening sob with decreased output from R chest catheter.  He will present to the ED for further eval.  His wife will drive him.    Reviewed chart for 10 minutes.         BMI:   Estimated body mass index is 30.57 kg/m  as calculated from the following:    Height as of 2/16/22: 1.753 m (5' 9\").    Weight as of this encounter: 93.9 kg (207 lb). "           No follow-ups on file.    Radha Shetty, JERRY CNP  [facility] Ryan is a 41 year old who presents for the following health issues     History of Present Illness     Asthma:  He presents for follow up of asthma.  He has some cough, no wheezing, and no shortness of breath. He is not using a relief medication. He does not miss any doses of his controller medication throughout the week.Patient is aware of the following triggers: none. The patient has had a visit to the Emergency Room, Urgent Care or Hospital due to asthma since the last clinic visit. He has been to the Emergency Room or Urgent Care 0 times.He has had a Hospitalization 0 times.    He eats 0-1 servings of fruits and vegetables daily.He consumes 1 sweetened beverage(s) daily.He exercises with enough effort to increase his heart rate 60 or more minutes per day.  He exercises with enough effort to increase his heart rate 7 days per week. He is missing 7 dose(s) of medications per week.       Diabetes Follow-up Blood sugars running at around 500. Oncologist has been checking BS. Oncology ordered labs to be done here today. Has pleural cath to drain lungs. Having continual Hiccups, [facility] out of Reglan.      How often are you checking your blood sugar? Not at all    What concerns do you have today about your diabetes? Brain radiation Monday, brain cx feeding off sugars. Dx Brain CX 2 weeks ago.Tumors in Brain, lung, chest and abd. Cancer originated in right lung.     Do you have any of these symptoms? (Select all that apply)  Excessive thirst and Blurry vision, weight loss.    Have you had a diabetic eye exam in the last 12 months? Yes 2 mo ago        BP Readings from Last 2 Encounters:   03/09/22 (!) 133/98   02/24/22 (!) 166/89     Hemoglobin A1C POCT (%)   Date Value   05/12/2020 11.9 (H)   08/10/2018 6.3 (H)     Hemoglobin A1C (%)   Date Value   01/19/2022 10.1 (H)     LDL Cholesterol Calculated  (mg/dL)   Date Value   02/24/2022 107 (H)   08/10/2018 121 (H)   06/29/2017 102 (H)         Follow up.  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  Dx 1/2022.  Since that time has had thoracentesis.  Also has Pleurx catheter in R chest for drainage.  He has been draining this at home but just over the past 2 days notes sob and no drainage coming from catheter.  He feels winded, especially with exertion.  He has also had issues with nausea and vomiting since that time.    He needs to resume treatment of his DM.  He has been eating well up until the past few days.      Review of Systems   Constitutional, HEENT, cardiovascular, pulmonary, gi and gu systems are negative, except as otherwise noted.      Objective    /80   Pulse 111   Temp 97.7  F (36.5  C) (Oral)   Resp 20   Wt 93.9 kg (207 lb)   SpO2 97%   BMI 30.57 kg/m    Body mass index is 30.57 kg/m .  Physical Exam   GENERAL: healthy, alert and no distress  RESP: lungs clear but appears sob when talking.  CV: regular rate and rhythm, normal S1 S2, no S3 or S4, no murmur, click or rub, no peripheral edema and peripheral pulses strong  PSYCH: mentation appears normal, affect normal/bright

## 2022-02-24 NOTE — ED TRIAGE NOTES
Pt presents to ED from PCP clinic. Pt was in for a diabetes maintenance appt. Pt has a drain placed in his right side to drain fluid off his lung due to cancer. Pt's PCP feel the drain is not functioning properly and he sounds like he has congestion in his lungs. Referred to ED for eval. Pt states that he has been vomiting and feeling short of breath for the last few days. ABC intact.

## 2022-02-24 NOTE — ED NOTES
Unable to aspirate any pleural fluid per MD request. Unable to pull back any fluid or air when attempting.

## 2022-02-25 LAB
ATRIAL RATE - MUSE: 94 BPM
CREAT UR-MCNC: 23 MG/DL
DIASTOLIC BLOOD PRESSURE - MUSE: NORMAL MMHG
INTERPRETATION ECG - MUSE: NORMAL
MICROALBUMIN UR-MCNC: <5 MG/L
MICROALBUMIN/CREAT UR: NORMAL MG/G{CREAT}
P AXIS - MUSE: 38 DEGREES
PR INTERVAL - MUSE: 132 MS
QRS DURATION - MUSE: 94 MS
QT - MUSE: 354 MS
QTC - MUSE: 442 MS
R AXIS - MUSE: 36 DEGREES
SYSTOLIC BLOOD PRESSURE - MUSE: NORMAL MMHG
T AXIS - MUSE: 21 DEGREES
VENTRICULAR RATE- MUSE: 94 BPM

## 2022-02-26 ENCOUNTER — TELEPHONE (OUTPATIENT)
Dept: INTERNAL MEDICINE | Facility: CLINIC | Age: 42
End: 2022-02-26
Payer: MEDICAID

## 2022-02-26 LAB
ALBUMIN SERPL-MCNC: 3 G/DL (ref 3.4–5)
ALP SERPL-CCNC: 126 U/L (ref 40–150)
ALT SERPL W P-5'-P-CCNC: 23 U/L (ref 0–70)
ANION GAP SERPL CALCULATED.3IONS-SCNC: 11 MMOL/L (ref 3–14)
AST SERPL W P-5'-P-CCNC: 12 U/L (ref 0–45)
BILIRUB SERPL-MCNC: 0.6 MG/DL (ref 0.2–1.3)
BUN SERPL-MCNC: 26 MG/DL (ref 7–30)
CALCIUM SERPL-MCNC: 8.6 MG/DL (ref 8.5–10.1)
CHLORIDE BLD-SCNC: 97 MMOL/L (ref 94–109)
CHOLEST SERPL-MCNC: 208 MG/DL
CK SERPL-CCNC: 31 U/L (ref 30–300)
CO2 SERPL-SCNC: 23 MMOL/L (ref 20–32)
CREAT SERPL-MCNC: 0.66 MG/DL (ref 0.66–1.25)
FASTING STATUS PATIENT QL REPORTED: YES
GFR SERPL CREATININE-BSD FRML MDRD: >90 ML/MIN/1.73M2
GLUCOSE BLD-MCNC: 552 MG/DL (ref 70–99)
HDLC SERPL-MCNC: 33 MG/DL
LDLC SERPL CALC-MCNC: 107 MG/DL
NONHDLC SERPL-MCNC: 175 MG/DL
POTASSIUM BLD-SCNC: 4.5 MMOL/L (ref 3.4–5.3)
PROT SERPL-MCNC: 6.3 G/DL (ref 6.8–8.8)
SODIUM SERPL-SCNC: 131 MMOL/L (ref 133–144)
TRIGL SERPL-MCNC: 338 MG/DL

## 2022-02-26 NOTE — TELEPHONE ENCOUNTER
On Call MD for Litzy. I do not cover Beverley, but I wanted to help the lab    Lab, [facility], called with abnormal labs.  After the OV with CARRINGTON Lowery, the patient went to ER.   No need for further call today

## 2022-02-28 ENCOUNTER — PATIENT OUTREACH (OUTPATIENT)
Dept: ONCOLOGY | Facility: CLINIC | Age: 42
End: 2022-02-28
Payer: MEDICAID

## 2022-02-28 ENCOUNTER — TELEPHONE (OUTPATIENT)
Dept: PULMONOLOGY | Facility: CLINIC | Age: 42
End: 2022-02-28
Payer: MEDICAID

## 2022-02-28 DIAGNOSIS — Z11.59 ENCOUNTER FOR SCREENING FOR OTHER VIRAL DISEASES: Primary | ICD-10-CM

## 2022-02-28 NOTE — PROGRESS NOTES
Called to check in on how Pt was feeling, blood sugar levels, and planned OC start date. Pt states he is feeling well, no questions at this time. Has abstained from sugar product and working with PCP to manage. States #'s are starting to come down, confirms that he is starting alectinib on 3/2

## 2022-03-01 NOTE — TELEPHONE ENCOUNTER
Spoke with patient to schedule procedure with Dr. Antonio   Procedure was scheduled on 03/09 at [facility] OR  Patient will have H&P with: not neded    Patient is aware a COVID-19 test is needed before their procedure. The test should be with-in 4 days of their procedure.   Test Details: Date 03/08 Location [facility]    Patient is aware a / is needed day of surgery.   Surgery letter was sent via [employer], patient has my direct contact information for any further questions.

## 2022-03-08 ENCOUNTER — LAB (OUTPATIENT)
Dept: LAB | Facility: CLINIC | Age: 42
End: 2022-03-08
Payer: MEDICAID

## 2022-03-08 DIAGNOSIS — Z11.59 ENCOUNTER FOR SCREENING FOR OTHER VIRAL DISEASES: ICD-10-CM

## 2022-03-08 LAB — SARS-COV-2 RNA RESP QL NAA+PROBE: NEGATIVE

## 2022-03-08 PROCEDURE — U0003 INFECTIOUS AGENT DETECTION BY NUCLEIC ACID (DNA OR RNA); SEVERE ACUTE RESPIRATORY SYNDROME CORONAVIRUS 2 (SARS-COV-2) (CORONAVIRUS DISEASE [COVID-19]), AMPLIFIED PROBE TECHNIQUE, MAKING USE OF HIGH THROUGHPUT TECHNOLOGIES AS DESCRIBED BY CMS-2020-01-R: HCPCS

## 2022-03-08 PROCEDURE — U0005 INFEC AGEN DETEC AMPLI PROBE: HCPCS

## 2022-03-09 ENCOUNTER — HOSPITAL ENCOUNTER (OUTPATIENT)
Facility: CLINIC | Age: 42
Discharge: HOME | End: 2022-03-09
Attending: STUDENT IN AN ORGANIZED HEALTH CARE EDUCATION/TRAINING PROGRAM | Admitting: STUDENT IN AN ORGANIZED HEALTH CARE EDUCATION/TRAINING PROGRAM
Payer: MEDICAID

## 2022-03-09 ENCOUNTER — TELEPHONE (OUTPATIENT)
Dept: ONCOLOGY | Facility: CLINIC | Age: 42
End: 2022-03-09
Payer: MEDICAID

## 2022-03-09 ENCOUNTER — TELEPHONE (OUTPATIENT)
Dept: NEUROSURGERY | Facility: CLINIC | Age: 42
End: 2022-03-09

## 2022-03-09 VITALS
BODY MASS INDEX: 30.34 KG/M2 | WEIGHT: 200.18 LBS | HEIGHT: 68 IN | RESPIRATION RATE: 20 BRPM | HEART RATE: 76 BPM | OXYGEN SATURATION: 95 % | SYSTOLIC BLOOD PRESSURE: 133 MMHG | DIASTOLIC BLOOD PRESSURE: 98 MMHG

## 2022-03-09 LAB — GLUCOSE BLDC GLUCOMTR-MCNC: 344 MG/DL (ref 70–99)

## 2022-03-09 PROCEDURE — 82962 GLUCOSE BLOOD TEST: CPT

## 2022-03-09 PROCEDURE — 250N000009 HC RX 250: Performed by: STUDENT IN AN ORGANIZED HEALTH CARE EDUCATION/TRAINING PROGRAM

## 2022-03-09 PROCEDURE — 32551 INSERTION OF CHEST TUBE: CPT | Performed by: STUDENT IN AN ORGANIZED HEALTH CARE EDUCATION/TRAINING PROGRAM

## 2022-03-09 PROCEDURE — 258N000003 HC RX IP 258 OP 636: Performed by: STUDENT IN AN ORGANIZED HEALTH CARE EDUCATION/TRAINING PROGRAM

## 2022-03-09 PROCEDURE — 250N000011 HC RX IP 250 OP 636: Performed by: STUDENT IN AN ORGANIZED HEALTH CARE EDUCATION/TRAINING PROGRAM

## 2022-03-09 PROCEDURE — 32561 LYSE CHEST FIBRIN INIT DAY: CPT | Performed by: STUDENT IN AN ORGANIZED HEALTH CARE EDUCATION/TRAINING PROGRAM

## 2022-03-09 RX ORDER — LIDOCAINE 40 MG/G
CREAM TOPICAL
Status: DISCONTINUED | OUTPATIENT
Start: 2022-03-09 | End: 2022-03-09 | Stop reason: HOSPADM

## 2022-03-09 RX ADMIN — DORNASE ALFA 50 ML: 1 SOLUTION RESPIRATORY (INHALATION) at 15:32

## 2022-03-09 NOTE — DISCHARGE INSTRUCTIONS
Post-Bronchoscopy Patient Instructions:    March 9, 2022  Connor Emerson      Your procedure (TPA/dornase instillation into pleurx catheter) was completed without any immediate complications.    Please drain your catheter after 2 hours and drain at least every other day for a week.       Please message or call our office in a week in week if you are still having minimal drainage.

## 2022-03-09 NOTE — ORAL ONC MGMT
Oral Chemotherapy Monitoring Program    Subjective/Objective:  Connor Emerson is a 41 year old male contacted by phone for a follow-up visit for oral chemotherapy.  Connor started Alectinib on 3/4/22. He was taking dose incorrectly at 2 tablets (300 mg) po BID due to a misunderstanding with the directions. He will start now taking 4 tablets (600 mg) po BID. He confirms taking with meals and has not missed any doses. He reports experiencing some nausea the first couple days but that has self-resolved. He also reports feeling fatigued, encouraged him stay as active as he can, but give himself opportunities to rest as needed.    ORAL CHEMOTHERAPY 2/16/2022 2/17/2022 2/23/2022 3/9/2022   Assessment Type Initial Work up Lab Monitoring;Other;Chart Review New Teach Initial Follow up   Diagnosis Code Non-Small Cell Lung Cancer Non-Small Cell Lung Cancer Non-Small Cell Lung Cancer Non-Small Cell Lung Cancer   Providers Dr. Cori Persaud   [facility]   Drug Name Alecensa (alectinib) Alecensa (alectinib) Alecensa (alectinib) Alecensa (alectinib)   Dose 600 mg 600 mg 600 mg 600 mg   Current Schedule BID BID BID BID   Cycle Details Continuous Continuous Continuous Continuous   Start Date of Last Cycle - - - 3/4/2022   Adherence Assessment - - - Adherent   Adverse Effects - - - Nausea;Fatigue   Any new drug interactions? No - - No   Is the dose as ordered appropriate for the patient? Yes - - -       Last PHQ-2 Score on record:   PHQ-2 ( 1999 Pfizer) 2/24/2022 1/25/2022   Q1: Little interest or pleasure in doing things 0 0   Q2: Feeling down, depressed or hopeless 0 0   PHQ-2 Score 0 0   PHQ-2 Total Score (12-17 Years)- Positive if 3 or more points; Administer PHQ-A if positive - -   Q1: Little interest or pleasure in doing things Not at all -   Q2: Feeling down, depressed or hopeless Not at all -   PHQ-2 Score 0 -       Vitals:  BP:   BP Readings from Last  "1 Encounters:   02/24/22 (!) 166/89     Wt Readings from Last 1 Encounters:   02/24/22 93.9 kg (207 lb)     Estimated body surface area is 2.14 meters squared as calculated from the following:    Height as of 2/16/22: 1.753 m (5' 9\").    Weight as of 2/24/22: 93.9 kg (207 lb).    Labs:  _  Result Component Current Result Ref Range   Sodium 133 (2/24/2022) 133 - 144 mmol/L     _  Result Component Current Result Ref Range   Potassium 3.8 (2/24/2022) 3.4 - 5.3 mmol/L     _  Result Component Current Result Ref Range   Calcium 8.4 (L) (2/24/2022) 8.5 - 10.1 mg/dL     No results found for Mag within last 30 days.     No results found for Phos within last 30 days.     _  Result Component Current Result Ref Range   Albumin 3.0 (L) (2/24/2022) 3.4 - 5.0 g/dL     _  Result Component Current Result Ref Range   Urea Nitrogen 23 (2/24/2022) 7 - 30 mg/dL     _  Result Component Current Result Ref Range   Creatinine 0.49 (L) (2/24/2022) 0.66 - 1.25 mg/dL     _  Result Component Current Result Ref Range   AST 12 (2/24/2022) 0 - 45 U/L     _  Result Component Current Result Ref Range   ALT 23 (2/24/2022) 0 - 70 U/L     _  Result Component Current Result Ref Range   Bilirubin Total 0.6 (2/24/2022) 0.2 - 1.3 mg/dL     _  Result Component Current Result Ref Range   WBC Count 13.1 (H) (2/24/2022) 4.0 - 11.0 10e3/uL     _  Result Component Current Result Ref Range   Hemoglobin 15.7 (2/24/2022) 13.3 - 17.7 g/dL     _  Result Component Current Result Ref Range   Platelet Count 255 (2/24/2022) 150 - 450 10e3/uL     No results found for ANC within last 30 days.     _  Result Component Current Result Ref Range   Absolute Neutrophils 9.6 (H) (2/24/2022) 1.6 - 8.3 10e3/uL          Assessment/Plan:  Connor is tolerating alectinib, however, at lower dose due to misunderstanding reading pill bottle directions and will increase dose to prescribed dose of 600 mg po BID with meals. Encouraged him to reach out if questions/concerns. "     Follow-Up:  Needs lab apppointment mid to late next week. He has standing orders and will call to make a lab appointment.     Erna Rashid, PharmD, [facility]  Oral Chemotherapy Monitoring Program  [facility]  [phone]  March 9, 2022

## 2022-03-09 NOTE — OP NOTE
INTERVENTIONAL PULMONOLOGY       Procedure(s):    Tunneled catheter interrogation , lytic instillation     Indication:  Malignant pleural effusion , catheter malfunction     Attending of Record:  Sushila Antonio MD     Interventional Pulmonary Fellow   Kady Abbott MD     Trainees Present:   None     Medications:    None    Sedation Time:   None    Time Out:  Performed    The patient's medical record has been reviewed.  The indication for the procedure was reviewed.  The necessary history and physical examination was performed and reviewed.  The risks, benefits and alternatives of the procedure were discussed with the the patient in detail and he had the opportunity to ask questions.  I discussed in particular the potential complications including risks of minor or life-threatening bleeding and/or infection, respiratory failure, vocal cord trauma / paralysis, pneumothorax, and discomfort. Sedation risks were also discussed including abnormal heart rhythms, low blood pressure, and respiratory failure. All questions were answered to the best of my ability.  Verbal and written informed consent was obtained.  The proposed procedure and the patient's identification were verified prior to the procedure by the physician and the nurse.    The patient was assessed for the adequacy for the procedure and to receive medications.   Mental Status:  Alert and oriented x 3  Airway examination:  Class II (Complete visualization of uvula)  Pulmonary:  Decreased breathsounds in bilateral bases  CV:  RRR, no murmurs or gallops  ASA Grade:  (III)  Severe systemic disease    After clinical evaluation and reviewing the indication, risks, alternatives and benefits of the procedure the patient was deemed to be in satisfactory condition to undergo the procedure.      Immediately before administration of medications the patient was re-assessed for adequacy to receive sedatives including the heart rate, respiratory rate, mental status,  oxygen saturation, blood pressure and adequacy of pulmonary ventilation. These same parameters were continuously monitored throughout the procedure.    A Tuberculosis risk assessment was performed:  The patient has no known RISK of Tuberculosis    The procedure was performed in a negative airflow room: The patient could not be moved to a negative airflow room because of no risk of TB    Maneuvers / Procedure:      Tunneled catheter was prepped , flushed with saline . Flushes forward and draws back .   Ultrasound revealed right sided pleural fluid, small in caliber, complex appearing by ultrasound   TPA/Dornase 50 ml was instilled  , followed by 5 ml saline     Any disposable equipment was visually inspected and deemed to be intact immediately post procedure.      Relevant Pictures      Recommendations:   Patent catheter, small effusion which is complex. Lytics instilled ( 50 ml + 5 ml) with recommendation for 2 hour dwell time , after which patient should drain it.     -->  Further follow up with primary pulmonologist     MD Sandra Ruvalcaba  Interventional Pulmonology Surgery Scheduler  Office: [phone]  Email: [email or web address]

## 2022-03-09 NOTE — OR NURSING
TPA flush through pleurex catheter performed by Dr. Antonio & Dr. Abbott, no sedating medications given, patient tolerated well. Transferred to  and report given to recovery RN.

## 2022-03-16 ENCOUNTER — TELEPHONE (OUTPATIENT)
Dept: ONCOLOGY | Facility: CLINIC | Age: 42
End: 2022-03-16
Payer: MEDICAID

## 2022-03-16 ENCOUNTER — NURSE TRIAGE (OUTPATIENT)
Dept: ONCOLOGY | Facility: CLINIC | Age: 42
End: 2022-03-16
Payer: MEDICAID

## 2022-03-16 DIAGNOSIS — C34.31 MALIGNANT NEOPLASM OF LOWER LOBE OF RIGHT LUNG (H): Primary | ICD-10-CM

## 2022-03-16 NOTE — TELEPHONE ENCOUNTER
Oral Chemotherapy Monitoring Program     Placed call to Connor Emerson in follow up of alecensia oral chemotherapy. This was to discuss recommendations from Dr. Persaud to complete a CMP and a CK asap due to concerns of pt's symptoms of severe muscle aches and pain. Pt stated understanding and will call the [facility] clinic across from his work after we speak to see if he can get in. Discussed that the results and next steps will be determined by Chelsea Villegas CNP at their upcoming appt on 3/21 per Dr. Persaud. Pt verbalized understanding and agrees to plan. No further questions or concerns at this time, but encouraged pt to call if any were to arise.      Mai Aguilar, PharmD, BCACP  Oral Chemotherapy Monitoring Program  [facility]  [phone]  March 16, 2022

## 2022-03-16 NOTE — TELEPHONE ENCOUNTER
[facility] Triage    Jennifer Message:  Hello, so am I supposed to be this sore taking this med? I actually hurt pretty bad! Its more like an ache. This ache is every muscle I have. It's hard to even walk? I read this was a side effect of this pill. Will this go away? Qing concerned. Thanks guys.       Patient is taking Alecensa and he is sore.  Every muscle aches - Both legs, arms, thighs, chest , pectorals hurts like he just finished at the gym. When he walks his legs shake.  He has to hold the wall.  Walking upstairs hurts so bad.  Last 3 days unable to sleep well at night.  Winded due to pain and his need to walk a lot at work.    Took 5 mg of percocet and didn't touch it before he went to work.    No fever  No CP  No SOB  NO bruising    Advised patient I will route this to Oral Chemo, Dr. Persaud and Jamari Ya for follow up and one of us will get back to him.    Routing to Oral Chemo Pharmacy, Cori Devries and Jamari Ya

## 2022-03-17 ENCOUNTER — LAB (OUTPATIENT)
Dept: LAB | Facility: CLINIC | Age: 42
End: 2022-03-17
Payer: MEDICAID

## 2022-03-17 DIAGNOSIS — Z79.899 ENCOUNTER FOR LONG-TERM (CURRENT) USE OF MEDICATIONS: ICD-10-CM

## 2022-03-17 DIAGNOSIS — Z11.59 NEED FOR HEPATITIS C SCREENING TEST: ICD-10-CM

## 2022-03-17 DIAGNOSIS — C34.31 MALIGNANT NEOPLASM OF LOWER LOBE OF RIGHT LUNG (H): ICD-10-CM

## 2022-03-17 DIAGNOSIS — Z11.4 SCREENING FOR HIV (HUMAN IMMUNODEFICIENCY VIRUS): ICD-10-CM

## 2022-03-17 LAB
BASOPHILS # BLD AUTO: 0.1 10E3/UL (ref 0–0.2)
BASOPHILS NFR BLD AUTO: 1 %
EOSINOPHIL # BLD AUTO: 0.3 10E3/UL (ref 0–0.7)
EOSINOPHIL NFR BLD AUTO: 4 %
ERYTHROCYTE [DISTWIDTH] IN BLOOD BY AUTOMATED COUNT: 14.7 % (ref 10–15)
HCT VFR BLD AUTO: 41.6 % (ref 40–53)
HGB BLD-MCNC: 13.1 G/DL (ref 13.3–17.7)
LYMPHOCYTES # BLD AUTO: 1.8 10E3/UL (ref 0.8–5.3)
LYMPHOCYTES NFR BLD AUTO: 25 %
MCH RBC QN AUTO: 29.6 PG (ref 26.5–33)
MCHC RBC AUTO-ENTMCNC: 31.5 G/DL (ref 31.5–36.5)
MCV RBC AUTO: 94 FL (ref 78–100)
MONOCYTES # BLD AUTO: 0.5 10E3/UL (ref 0–1.3)
MONOCYTES NFR BLD AUTO: 7 %
NEUTROPHILS # BLD AUTO: 4.5 10E3/UL (ref 1.6–8.3)
NEUTROPHILS NFR BLD AUTO: 63 %
PLATELET # BLD AUTO: 241 10E3/UL (ref 150–450)
RBC # BLD AUTO: 4.42 10E6/UL (ref 4.4–5.9)
WBC # BLD AUTO: 7.1 10E3/UL (ref 4–11)

## 2022-03-17 PROCEDURE — 82550 ASSAY OF CK (CPK): CPT

## 2022-03-17 PROCEDURE — 85025 COMPLETE CBC W/AUTO DIFF WBC: CPT

## 2022-03-17 PROCEDURE — 36415 COLL VENOUS BLD VENIPUNCTURE: CPT

## 2022-03-17 PROCEDURE — 80053 COMPREHEN METABOLIC PANEL: CPT

## 2022-03-18 ENCOUNTER — PATIENT OUTREACH (OUTPATIENT)
Dept: ONCOLOGY | Facility: CLINIC | Age: 42
End: 2022-03-18
Payer: MEDICAID

## 2022-03-18 DIAGNOSIS — G89.3 CANCER ASSOCIATED PAIN: ICD-10-CM

## 2022-03-18 LAB
ALBUMIN SERPL-MCNC: 3.2 G/DL (ref 3.4–5)
ALP SERPL-CCNC: 169 U/L (ref 40–150)
ALT SERPL W P-5'-P-CCNC: 23 U/L (ref 0–70)
ANION GAP SERPL CALCULATED.3IONS-SCNC: 6 MMOL/L (ref 3–14)
AST SERPL W P-5'-P-CCNC: 11 U/L (ref 0–45)
BILIRUB SERPL-MCNC: 1 MG/DL (ref 0.2–1.3)
BUN SERPL-MCNC: 12 MG/DL (ref 7–30)
CALCIUM SERPL-MCNC: 9.1 MG/DL (ref 8.5–10.1)
CHLORIDE BLD-SCNC: 99 MMOL/L (ref 94–109)
CK SERPL-CCNC: 40 U/L (ref 30–300)
CO2 SERPL-SCNC: 30 MMOL/L (ref 20–32)
CREAT SERPL-MCNC: 0.59 MG/DL (ref 0.66–1.25)
GFR SERPL CREATININE-BSD FRML MDRD: >90 ML/MIN/1.73M2
GLUCOSE BLD-MCNC: 586 MG/DL (ref 70–99)
POTASSIUM BLD-SCNC: 4.5 MMOL/L (ref 3.4–5.3)
PROT SERPL-MCNC: 6.5 G/DL (ref 6.8–8.8)
SODIUM SERPL-SCNC: 135 MMOL/L (ref 133–144)

## 2022-03-18 RX ORDER — OXYCODONE HYDROCHLORIDE 5 MG/1
5 TABLET ORAL EVERY 6 HOURS PRN
Qty: 30 TABLET | Refills: 0 | Status: SHIPPED | OUTPATIENT
Start: 2022-03-18 | End: 2022-04-28

## 2022-03-18 NOTE — PROGRESS NOTES
Called Pt regarding high glucose labs. Dr Persaud wants ED and hold alectinib. If insulin admin for diabetes has been an issue discuss further options.  Pt reports some factors may have contributed to #'s. Said this AM had large glass of OJ but would check blood sugar while on the phone. It was 459. We discussed quite a bit and Pt felt he would like to try to treat at home, but would go in if he felt symptomatic or #'s were uncontrolled over weekend. His primary concern was the musculoskeletal pain described in 3/17 encounter. Pt requested refill of 5 mg oxy (see refill encounter). Has 1 left and uses infrequently, but his need of one yesterday and the oncoming weekend give him concern. Regardless of ED or pain status, Pt will hold alectinib. Has follow-up appt with Chelsea ESQUIVEL on Monday already scheduled. Updated Dr Persaud who signed the refill. Notified Pt of refill.

## 2022-03-18 NOTE — TELEPHONE ENCOUNTER
Refill Request    Date of most recent appointment:  2/16/22  Next upcoming appointment:   3/21/22    Medication requested:  oxyCODONE (ROXICODONE) 5 MG tablet  Quantity:  12  Last fill date:  2/16/22  Person requesting refill:  Pt  Notes:    Has 1 pill left. Occasionally takes, has been using for the systematic MSK pain.     Prescribing provider(s):  Dr Persaud  Refill approved/denied:  Approved    Disposition of prescription:  GENARO brasher

## 2022-03-18 NOTE — PROGRESS NOTES
MEDICAL ONCOLOGY FOLLOW UP NOTE    PATIENT NAME: Connor Emerson  ENCOUNTER DATE: 3/21/2022    Care Team  Primary Oncologist: Bassam Persaud MD    REASON FOR CURRENT VISIT: F/u of lung cancer    HISTORY OF PRESENT ILLNESS:  Mr. Connor Emerson is a 41 year old  male who is a non-smoker with PMHx of T2DM, HTN with metastatic NSCLC comes for follow up     Oncologic Hx:    Diagnosis:   Stage IV NSCLC, Rt lung adenocarcinoma with metastasis to pleura, mediastinum , rt pleural effusion and brain diagnosed 1/2022 (AJCC 8th edition)  PD-L1 TPS 2-3% by [place]   NGS [facility] panel-EML4:ALK rearragement  NGS Guardant- GNAS R201H, KRAS K5E- No ALK    Treatment:   2/15/22- GK to 11 briain lesions  Alectinib 600 mg BID started 3/2  Dose reduced to 450 mg BID due to grade 3 myalgias 3/21/22    Intent of treatment: Palliative    Oncologic course:  1/19/22 to 1/22/22-Admitted to [facility] for 2 week progressive SOB secondary to have large rt sided pleural effusion, needing thoracentesis x2 (1.7L and 2.0 L removed), cytology positive for malignancy, adenocarcinoma.   1/26/22- Rt pleural mass biopsy-Dr. Agrawal--POSITIVE FOR ADENOCARCINOMA CONSISTENT WITH LUNG PRIMARY, admixed with mesothelial hyperplasia and inflammatory infiltrate (+ TTF-1 and CK 7;  negative  p40, calretinin and WT-1. PAX8 immunostain focal +). 4th thoracentesis done simultaneously - 3L approx removed.   2/1/22- PET/CT-Right lower lobe central infiltrative FDG avid 8.2 x 9.6 cm mass representing a primary lung adenocarcinoma. Ipsilateral right perihilar, bilateral pretracheal, subcarinal and superior mediastinal michele metastases. Contralateral mildly FDG avid few lung nodules are suspicious for contralateral metastasis. At least 3 intracranial metastases in the right frontal lobe, left frontal lobe and left cerebellar hemisphere. Nonspecific mild diffuse bone marrow uptake. Further evaluation with a spine MRI could be considered to rule out early marrow infiltration. This  could also be seen with red marrow conversion.  2/5/22-  Brain MRI- At least 9 intracranial metastases as detailed above. The dominant lesions involving the orbital right frontal lobe, the posterior left middle frontal gyrus, anterior right temporal lobe and in the left cerebellar hemisphere have surrounding moderate vasogenic type edema.  2/15/22- Saw Dr. Arango from [facility]- Rcd GK to 12 lesion in bran  2/16/22- Pleurex placement     Interval Hx:  Patient is here today alone in the clinic. He started the alectinib around March 2 and was tolerating okay initially but then last Monday (3/14), he noticed onset of diffuse muscle soreness bilaterally, throughout his body. It was so severe that he was unable to really do anything. He also had severe fatigue and was laying in bed for much of the week as a result. We instructed him to hold his alectinib and he has noticed great improvement in both myalgias and fatigue since.     He has had blood sugars in the 300's since last week and has been correcting with insulin. He has a blood pressure cuff at home but does not routinely check his blood pressure. He reports he used to be on a HTN agent.     He is now off the dex. No neurological symptoms but does note dry eye and some blurred vision. He is using artifical tears. He was seem by his eye provider late last fall.      He is getting less output from his pleurex, getting only 20 ml out yesterday. He denies worsening dyspnea as he has previously experienced prompting ED visit. No/f/c. No cough.    REVIEW OF SYSTEMS: 14 point ROS negative other than the symptoms noted above in the HPI.    Wt Readings from Last 4 Encounters:   03/21/22 93.9 kg (207 lb)   03/09/22 90.8 kg (200 lb 2.8 oz)   02/24/22 93.9 kg (207 lb)   02/16/22 95.9 kg (211 lb 6.7 oz)      Review of Systems:  A comprehensive ROS was performed and found to be negative or non-contributory with the exception of that noted in the HPI above.    Past Medical  History:  GERD  Hypertension, not on medication  Type 2 diabetes mellitus, not on medications currently, previously on Metformin    Past Surgical History:  Past Surgical History:   Procedure Laterality Date     BRONCHOSCOPY RIGID OR FLEXIBLE W/TRANSENDOSCOPIC ENDOBRONCHIAL ULTRASOUND GUIDED Bilateral 2022    Procedure: Right BRONCHOSCOPY, FIBEROPTIC, endobronchial ultrasound, pleural biopsy;  Surgeon: Dallin Agrawal MD;  Location:  OR     INJECT BLOCK MEDIAL BRANCH CERVICAL/THORACIC/LUMBAR       INSERT CHEST TUBE Right 2022    Procedure: INSERTION, CATHETER, INTERCOSTAL, FOR DRAINAGE;  Surgeon: Dallin Agrawal MD;  Location: [facility]     INSERT CHEST TUBE Right 3/9/2022    Procedure: INSERTION, CATHETER, INTERCOSTAL, FOR DRAINAGE;  Surgeon: Sushila Antonio MD;  Location:  GI     ORTHOPEDIC SURGERY      Ganesh. Rotator cuff repair.     PLEUROSCOPY N/A 2022    Procedure: Pleuroscopy with Pleural Biopsy;  Surgeon: Dallin Agrawal MD;  Location:  OR       Social History:  Lives with wife and 4 kids in [place]. Works as a  for an [employer] in [place]. Exposure to household chemicals and . No significant exposure to asbestos. No signal exposure to benzene or similar chemicals. No significant smoking history-states that he smoked 1 to 2 cigarettes occasionally per month for about 2 years in college, non-smoking since then. No significant alcohol use history. No other recreational substances. Good support system. Kids are 23, 19, 17 and 13.    Family History  Significant history for cancers on maternal side. Mother  of uterine cancer. 2 maternal uncles have possible metastatic melanoma.    Outpatient Medications:  Not currently taking any outpatient medications. Has been prescribed Metformin in the past.    Physical Exam:    Blood pressure (!) 157/100, pulse 96, temperature 98.9  F (37.2  C), temperature source Oral, weight 93.9 kg (207 lb), SpO2 98 %.      General: alert and cooperative, sitting up in chair  HEENT: sclera anicteric, EOMI, MMM. Pupils equal and reactive.   Neck: supple, normal ROM, no lymph nodes palpable.   CV: RRR, no murmurs  Resp: Right chest Pleurx catheter, RL and RML diminished  GI: soft, non-tender, non-distended, bowel sounds present and normoactive  MSK: warm and well-perfused, normal tone  Skin: no rashes on limited exam, no jaundice  Neuro: Alert awake and oriented x4, strength is 5 on 5 throughout, sensations are grossly intact    Labs & Studies: I personally reviewed the following studies:  Most Recent 3 CBC's:Recent Labs   Lab Test 03/17/22  1055 02/24/22  1224 01/20/22  0609   WBC 7.1 13.1* 7.8   HGB 13.1* 15.7 14.0   MCV 94 88 93    255 278     Most Recent 3 BMP's:  Recent Labs   Lab Test 03/17/22  1055 03/09/22  1356 02/24/22  1602 02/24/22  1425 02/24/22  1224     --   --  133 131*   POTASSIUM 4.5  --   --  3.8 4.5   CHLORIDE 99  --   --  100 97   CO2 30  --   --  27 23   BUN 12  --   --  23 26   CR 0.59*  --   --  0.49* 0.66   ANIONGAP 6  --   --  6 11   TORY 9.1  --   --  8.4* 8.6   * 344* 360* 434* 552*    Most Recent 2 LFT's:  Recent Labs   Lab Test 03/17/22  1055 02/24/22  1224   AST 11 12   ALT 23 23   ALKPHOS 169* 126   BILITOTAL 1.0 0.6    Most Recent TSH and T4:  Recent Labs   Lab Test 01/19/22  2006   TSH 1.81     I reviewed the above labs today.    ASSESSMENT AND PLAN:  Connor Corrales is a 41-year-old male non-smoker with a past medical history of hypertension and diabetes who presented with several weeks of dyspnea and found to have right-sided pleural effusion and a large right lung hilar mass, status post bronchoscopy and EBUS on 1/26/2022, with tissue sampling demonstrating adenocarcinoma on histopathological analysis.     Stage IV NSCLC, Rt lung adenocarcinoma with metastasis to pleura, mediastinum , rt pleural effusion and brain diagnosed 1/2022 (AJCC 8th edition)  PD-L1 TPS 2-3% by [place]    NGS [facility] panel-EML4:ALK rearragement; chr2:[id number], chr2:[id number]  NGS Guardant- GNAS R201H, KRAS K5E- No ALK    Overall prognosis for ALK rearrangement lung cancer is median overall survival > 7 years based on the most recent update of the WINSOME study. He began Alectinib 600 mg BID 3/2/22 and unfortunately developed grade 3 myalgias which have improved with holding drug. CK and lytes were normal. Given severity of SE, will dose reduce to 450 mg BID. He will monitor for return of SE.     Plan  Dose reduce alectinib to 450 mg BID, he will start this dose tonight.  Labs in 2 weeks  Oral Chemotherapy Pharmacy to continue to assist in toxicity monitoring   Ct CAP 5/2, visit with Dr. Persaud after  Continue to follow with PCP for DM II control given ongoing hyperglycemia    #grade 3 myalgias: onset ~2 weeks after starting alectinib. Now has improved with holding therapy. Dose reduction as above to 450 mg BID.    #grade 2 fatigue: monitor for less severe fatigue with dose reduction     # Brain mets: Brain MRI with several brain mets, s/p GK to 11-12 brain mets. Now off dexamenthasone  -Will need Brain MRI every 3 months    #dry eyes  #blurred vision  Continue artificial tears. He understands to return to his eye provider promptly if this continues. No neuro sx.     # Pleural effusion: s/p pleurex palcement  2/16/22  -still some local discomfort, tylenol prn but not really needing  -decreased amt of drainage. No increase SOB.   -Has f/u with IP next week, he will message Ade Valdivia sooner prn     #Psychiatry  # Situational Anxiety   - Feels more irritable lately. Declined referral to therapist and denies medication intervention for now. PCP can help manage this too if he has established relationship with her.     #COVID vaccine: No COVID vaccine on record, confirm at next visit    #Type 2 diabetes mellitus  #severe hyperglycemia  -declined ER last week when glucose was 586  -has been in the 300's since last week.  Wanted to check finger prick today in clinic but machine not working. On lantus per PCP, asked he message her today for further direction for continued elevated glucose readings.     #History of hypertension: Asked him to monitor blood pressure at home as he has not been. Add HTN agent pending trend.     50 minutes spent on the date of the encounter doing chart review, review of test results, interpretation of tests, patient visit, documentation and discussion with other provider(s)     Chelsea Villegas CNP on 3/21/2022 at 4:25 PM

## 2022-03-21 ENCOUNTER — ONCOLOGY VISIT (OUTPATIENT)
Dept: ONCOLOGY | Facility: CLINIC | Age: 42
End: 2022-03-21
Attending: NURSE PRACTITIONER
Payer: MEDICAID

## 2022-03-21 VITALS
BODY MASS INDEX: 31.47 KG/M2 | WEIGHT: 207 LBS | TEMPERATURE: 98.9 F | HEART RATE: 96 BPM | SYSTOLIC BLOOD PRESSURE: 157 MMHG | OXYGEN SATURATION: 98 % | DIASTOLIC BLOOD PRESSURE: 100 MMHG

## 2022-03-21 DIAGNOSIS — F43.23 ADJUSTMENT DISORDER WITH MIXED ANXIETY AND DEPRESSED MOOD: ICD-10-CM

## 2022-03-21 DIAGNOSIS — E11.9 TYPE 2 DIABETES MELLITUS WITHOUT COMPLICATION, UNSPECIFIED WHETHER LONG TERM INSULIN USE (H): ICD-10-CM

## 2022-03-21 DIAGNOSIS — C34.31 MALIGNANT NEOPLASM OF LOWER LOBE OF RIGHT LUNG (H): Primary | ICD-10-CM

## 2022-03-21 DIAGNOSIS — J90 PLEURAL EFFUSION ON RIGHT: ICD-10-CM

## 2022-03-21 DIAGNOSIS — C79.31 BRAIN METASTASIS: ICD-10-CM

## 2022-03-21 PROCEDURE — 99215 OFFICE O/P EST HI 40 MIN: CPT | Performed by: NURSE PRACTITIONER

## 2022-03-21 PROCEDURE — G0463 HOSPITAL OUTPT CLINIC VISIT: HCPCS

## 2022-03-21 ASSESSMENT — PAIN SCALES - GENERAL: PAINLEVEL: NO PAIN (0)

## 2022-03-21 NOTE — NURSING NOTE
"Oncology Rooming Note    March 21, 2022 2:04 PM   Connor Emerson is a 41 year old male who presents for:    Chief Complaint   Patient presents with     Oncology Clinic Visit     Initial Vitals: There were no vitals taken for this visit. Estimated body mass index is 30.44 kg/m  as calculated from the following:    Height as of 3/9/22: 1.727 m (5' 8\").    Weight as of 3/9/22: 90.8 kg (200 lb 2.8 oz). There is no height or weight on file to calculate BSA.  Data Unavailable Comment: Data Unavailable   No LMP for male patient.  Allergies reviewed: Yes  Medications reviewed: Yes    Medications: Medication refills not needed today.  Pharmacy name entered into EPIC:    [facility] [place], [place] - [id number]  [facility] - [place], [place] - [phone]    Clinical concerns: none       Christi Valdez, EMT            "

## 2022-03-26 DIAGNOSIS — J90 PLEURAL EFFUSION: Primary | ICD-10-CM

## 2022-03-28 ENCOUNTER — TELEPHONE (OUTPATIENT)
Dept: ONCOLOGY | Facility: CLINIC | Age: 42
End: 2022-03-28
Payer: MEDICAID

## 2022-03-28 NOTE — TELEPHONE ENCOUNTER
Subjective/Objective:  Connor Emerson is a 41 year old male contacted by phone for a follow-up visit for oral chemotherapy.  Pt confirms they are taking Alecensa 450mg twice daily. Pt reports no missed doses. Pt reports starting cycle on 3/21/22. Pt reports tolerating the medication well. Still a little fatigued, nothing like before. The muscles and aches and pain have gone away. Overall he is doing well, expect he states over the past week, he has been having terrible vision. He states he can not read essentially anything. States he is unable to read anything on his phone, he can't read signs while driving, although continues to drive and go to work. He is wondering if this is due to his brain mets or something else. We did discuss that the visual disturbance found with Alecensa is between 5-10%. Pt reports he last seen ophthalmologist back in Dec. Does wear contacts. Cleaning them like he should and changing them out frequently. Otherwise, no other questions or concerns at this time.     ORAL CHEMOTHERAPY 2/16/2022 2/17/2022 2/23/2022 3/9/2022 3/16/2022 3/18/2022 3/28/2022   Assessment Type Initial Work up Lab Monitoring;Other;Chart Review New Teach Initial Follow up Other Chart Review Initial Follow up;Other   Diagnosis Code Non-Small Cell Lung Cancer Non-Small Cell Lung Cancer Non-Small Cell Lung Cancer Non-Small Cell Lung Cancer Non-Small Cell Lung Cancer Non-Small Cell Lung Cancer Non-Small Cell Lung Cancer   Providers Dr. Cori Persaud   [facility]   Drug Name Alecensa (alectinib) Alecensa (alectinib) Alecensa (alectinib) Alecensa (alectinib) Alecensa (alectinib) Alecensa (alectinib) Alecensa (alectinib)   Dose 600 mg 600 mg 600 mg 600 mg 600 mg - 450 mg   Current Schedule BID BID BID BID BID - BID   Cycle Details Continuous Continuous Continuous Continuous Continuous Drug  "on Hold -   Start Date of Last Cycle - - - 3/4/2022 - - 3/21/2022   Doses missed in last 2 weeks - - - - - - 0   Adherence Assessment - - - Adherent - - -   Adverse Effects - - - Nausea;Fatigue - - -   Any new drug interactions? No - - No - - -   Is the dose as ordered appropriate for the patient? Yes - - - - - -   Is the patient currently in pain? - - - - Yes - -   Does the patient feel the pain is currently being managed by a provider? - - - - No - -       Vitals:  BP:   BP Readings from Last 1 Encounters:   03/21/22 (!) 157/100     Wt Readings from Last 1 Encounters:   03/21/22 93.9 kg (207 lb)     Estimated body surface area is 2.12 meters squared as calculated from the following:    Height as of 3/9/22: 1.727 m (5' 8\").    Weight as of 3/21/22: 93.9 kg (207 lb).    Labs:  _  Result Component Current Result Ref Range   Sodium 135 (3/17/2022) 133 - 144 mmol/L     _  Result Component Current Result Ref Range   Potassium 4.5 (3/17/2022) 3.4 - 5.3 mmol/L     _  Result Component Current Result Ref Range   Calcium 9.1 (3/17/2022) 8.5 - 10.1 mg/dL     No results found for Mag within last 30 days.     No results found for Phos within last 30 days.     _  Result Component Current Result Ref Range   Albumin 3.2 (L) (3/17/2022) 3.4 - 5.0 g/dL     _  Result Component Current Result Ref Range   Urea Nitrogen 12 (3/17/2022) 7 - 30 mg/dL     _  Result Component Current Result Ref Range   Creatinine 0.59 (L) (3/17/2022) 0.66 - 1.25 mg/dL       _  Result Component Current Result Ref Range   AST 11 (3/17/2022) 0 - 45 U/L     _  Result Component Current Result Ref Range   ALT 23 (3/17/2022) 0 - 70 U/L     _  Result Component Current Result Ref Range   Bilirubin Total 1.0 (3/17/2022) 0.2 - 1.3 mg/dL       _  Result Component Current Result Ref Range   WBC Count 7.1 (3/17/2022) 4.0 - 11.0 10e3/uL     _  Result Component Current Result Ref Range   Hemoglobin 13.1 (L) (3/17/2022) 13.3 - 17.7 g/dL     _  Result Component Current Result " Ref Range   Platelet Count 241 (3/17/2022) 150 - 450 10e3/uL     No results found for ANC within last 30 days.       Assessment/Plan:  Pt is currently tolerating therapy well. Plan to continue Alecensa 450mg twice daily as prescribed. IB message sent to Dr. Persaud in regards to visual disturances. I did recommend he make an appt with his opthalmologist as soon as possible to rule out any potential issues. Pt verbalized understanding and agrees to plan. Encouraged pt to call with any issues or concerns.    Follow-Up:  Will follow-up with any recommendations from Dr. Persaud. Pt will follow-up with us in regards to when his eye apt is.      Mai Aguilar, PharmD, BCACP  Oral Chemotherapy Monitoring Program  [facility]  [phone]  March 28, 2022

## 2022-03-29 ENCOUNTER — HOSPITAL ENCOUNTER (OUTPATIENT)
Dept: GENERAL RADIOLOGY | Facility: CLINIC | Age: 42
Discharge: HOME | End: 2022-03-29
Attending: INTERNAL MEDICINE
Payer: MEDICAID

## 2022-03-29 ENCOUNTER — VIRTUAL VISIT (OUTPATIENT)
Dept: PULMONOLOGY | Facility: CLINIC | Age: 42
End: 2022-03-29
Attending: INTERNAL MEDICINE
Payer: MEDICAID

## 2022-03-29 ENCOUNTER — HOSPITAL ENCOUNTER (OUTPATIENT)
Dept: CT IMAGING | Facility: CLINIC | Age: 42
Discharge: HOME | End: 2022-03-29
Attending: INTERNAL MEDICINE
Payer: MEDICAID

## 2022-03-29 DIAGNOSIS — J90 PLEURAL EFFUSION: ICD-10-CM

## 2022-03-29 DIAGNOSIS — J90 PLEURAL EFFUSION: Primary | ICD-10-CM

## 2022-03-29 PROCEDURE — 99204 OFFICE O/P NEW MOD 45 MIN: CPT | Mod: 95 | Performed by: INTERNAL MEDICINE

## 2022-03-29 PROCEDURE — 71045 X-RAY EXAM CHEST 1 VIEW: CPT

## 2022-03-29 PROCEDURE — 71260 CT THORAX DX C+: CPT

## 2022-03-29 PROCEDURE — 250N000009 HC RX 250: Performed by: INTERNAL MEDICINE

## 2022-03-29 PROCEDURE — 250N000011 HC RX IP 250 OP 636: Performed by: INTERNAL MEDICINE

## 2022-03-29 RX ORDER — IOPAMIDOL 755 MG/ML
500 INJECTION, SOLUTION INTRAVASCULAR ONCE
Status: COMPLETED | OUTPATIENT
Start: 2022-03-29 | End: 2022-03-29

## 2022-03-29 RX ADMIN — SODIUM CHLORIDE 63 ML: 9 INJECTION, SOLUTION INTRAVENOUS at 08:22

## 2022-03-29 RX ADMIN — IOPAMIDOL 100 ML: 755 INJECTION, SOLUTION INTRAVENOUS at 08:21

## 2022-03-29 NOTE — LETTER
3/29/2022     RE: Connor Emerson  [address]  [facility] [address]     Dear Colleague,    Thank you for referring your patient, Connor Emerson, to the [facility] at [facility]. Please see a copy of my visit note below.    Connor is a 41 year old who is being evaluated via a billable video visit.      How would you like to obtain your AVS? MyChart  If the video visit is dropped, the invitation should be resent by: Text to cell phone: [phone]   Will anyone else be joining your video visit? No  See note    LUNG NODULE & INTERVENTIONAL PULMONARY CLINIC  CLINICS & SURGERY [place], [facility]     Connor Emerson MRN# [id number]   Age: 41 year old YOB: 1980     Reason for Consultation: lung mass     Assessment and Plan:    1. Stage 4 NSCLC with malignant pleural effusion s/p pleurx. CTA chest with minimal residual effusion. Will plan to remove the pleurx within the next few weeks. He is agreeable.        Billing: I spent 45-59min (New, 96389) on the date of the encounter doing chart review, history and exam, documentation and further activities as described in this note.    Dallin Agrawal MD   of Medicine  Interventional Pulmonology  Department of Pulmonary, Allergy, Critical Care and Sleep Medicine   [facility]  Pager: [phone]          History:     Connor Emerson is a 41 year old male with sig h/o for NSCLC, GERD, HTN who is here for evaluation/followup of lung mass .    - No new resp sx or complaints. Denies dyspnea or cough.   - has malignant pleural effusion s/p pleurx placement. No drainage in the last week. CTA chest with small residual effusion and improved tumor burden and mediastinal LAD  - Personal hx of cancer: NSCLC  - Family hx of cancer: na  - Exposure hx: Denies asbestos or radon exposure   - Tobacco hx: Never smoker    - My interpretation of the images relevant for this visit includes: small right effusion   - My interpretation of the PFT's relevant for this visit includes: None     Other active medical problems include:   - Has DM2. Stable    - has HTN and GERD. Stable            Past Medical History:      Past Medical History:   Diagnosis Date     Atypical chest pain 12/02/2013     Complication of anesthesia      Diabetes (H)      GERD (gastroesophageal reflux disease) 12/02/2013     HTN (hypertension) 05/14/2012     HTN, goal below 140/90 07/02/2013     Insomnia 02/21/2012     Mediastinal lymphadenopathy      Migraine headache 07/02/2013     Migraine with aura, without mention of intractable migraine without mention of status migrainosus      Pneumonia            Past Surgical History:      Past Surgical History:   Procedure Laterality Date     BRONCHOSCOPY RIGID OR FLEXIBLE W/TRANSENDOSCOPIC ENDOBRONCHIAL ULTRASOUND GUIDED Bilateral 1/26/2022    Procedure: Right BRONCHOSCOPY, FIBEROPTIC, endobronchial ultrasound, pleural biopsy;  Surgeon: Dallin Agrawal MD;  Location: [facility] OR     INJECT BLOCK MEDIAL BRANCH CERVICAL/THORACIC/LUMBAR       INSERT CHEST TUBE Right 2/16/2022    Procedure: INSERTION, CATHETER, INTERCOSTAL, FOR DRAINAGE;  Surgeon: Dallin Agrawal MD;  Location: [facility] GI     INSERT CHEST TUBE Right 3/9/2022    Procedure: INSERTION, CATHETER, INTERCOSTAL, FOR DRAINAGE;  Surgeon: Sushila Antonio MD;  Location: [facility] GI     ORTHOPEDIC SURGERY      Ganesh. Rotator cuff repair.     PLEUROSCOPY N/A 1/26/2022    Procedure: Pleuroscopy with Pleural Biopsy;  Surgeon: Dallin Agrawal MD;  Location: [facility] OR          Social History:     Social History     Tobacco Use     Smoking status: Never Smoker     Smokeless tobacco: Never Used   Substance Use Topics     Alcohol use: Yes     Alcohol/week: 0.0 standard drinks     Comment: social          Family History:     Family History   Problem Relation Age of Onset     Unknown/Adopted Mother       Uterine Cancer Mother      Unknown/Adopted Father      Unknown/Adopted Maternal Grandmother      Unknown/Adopted Maternal Grandfather      Stomach Cancer Maternal Grandfather      Unknown/Adopted Paternal Grandmother      Unknown/Adopted Paternal Grandfather      Melanoma Maternal Uncle            Allergies:      Allergies   Allergen Reactions     Vicodin [Hydrocodone-Acetaminophen] Nausea and Vomiting and GI Disturbance          Medications:     Current Outpatient Medications   Medication Sig     alcohol swab prep pads Use to swab area of injection/jabier as directed.     alectinib (ALECENSA) 150 MG CAPS Take 4 capsules (600 mg) by mouth 2 times daily (Patient not taking: Reported on 3/21/2022)     blood glucose (NO BRAND SPECIFIED) lancets standard Use to test blood sugar 1 times daily or as directed.     blood glucose (NO BRAND SPECIFIED) test strip Use to test blood sugar 2 times daily or as directed. To accompany: Blood Glucose Monitor Brands: per insurance.     blood glucose (NO BRAND SPECIFIED) test strip Use to test blood sugar 1 times daily or as directed.     blood glucose calibration (NO BRAND SPECIFIED) solution To accompany: Blood Glucose Monitor Brands: per insurance.     blood glucose monitoring (NO BRAND SPECIFIED) meter device kit Use to test blood sugar 2 times daily or as directed. Preferred blood glucose meter OR supplies to accompany: Blood Glucose Monitor Brands: per insurance.     blood glucose monitoring (NO BRAND SPECIFIED) meter device kit Use to test blood sugar 1 times daily or as directed.     famotidine (PEPCID) 40 MG tablet Take 40 mg by mouth daily     insulin glargine (LANTUS PEN) 100 UNIT/ML pen Inject 10 Units Subcutaneous At Bedtime     insulin glargine (LANTUS PEN) 100 UNIT/ML pen Inject 15 Units Subcutaneous At Bedtime     insulin pen needle (31G X 8 MM) 31G X 8 MM miscellaneous Use one pen needles daily or as directed.     metoclopramide (REGLAN) 5 MG tablet Take 1 tablet (5  mg) by mouth 3 times daily as needed (hiccups)     nystatin (MYCOSTATIN) 821695 UNIT/ML suspension Take 2 mLs (200,000 Units) by mouth 4 times daily     oxyCODONE (ROXICODONE) 5 MG tablet Take 1 tablet (5 mg) by mouth every 6 hours as needed for pain     prochlorperazine (COMPAZINE) 10 MG tablet Take 1 tablet (10 mg) by mouth every 6 hours as needed (Nausea/Vomiting)     thin (NO BRAND SPECIFIED) lancets Use with lanceting device. To accompany: Blood Glucose Monitor Brands: per insurance.     No current facility-administered medications for this visit.          Review of Systems:     CONSTITUTIONAL: negative for fever, chills, change in weight  INTEGUMENTARY/SKIN: no rash or obvious new lesions  ENT/MOUTH: no sore throat, new sinus pain or nasal drainage  RESP: see interval history  CV: negative for chest pain, palpitations or peripheral edema  GI: no nausea, vomiting, change in stools  : no dysuria  MUSCULOSKELETAL: no myalgias, arthralgias  ENDOCRINE: blood sugars with adequate control  PSYCHIATRIC: mood stable  LYMPHATIC: no new lymphadenopathy  HEME: no bleeding or easy bruisability  NEURO: no numbness, weakness, headaches         Physical Exam:     Constitutional - looks well, in no apparent distress  Eyes - no redness or discharge  Respiratory -breathing appears comfortable. No wheeze or rhonchi.   Cardiac -- Normal rate, rhythm.   Skin - No appreciable discoloration or lesions (very limited exam)  Neurological - No apparent tremors. Speech fluent and articlate  Psychiatric - no signs of delirium or anxiety          Current Laboratory Data:   All laboratory and imaging data reviewed.    Results for orders placed or performed during the hospital encounter of 03/29/22 (from the past 24 hour(s))   XR Chest 1 View    Narrative    CHEST ONE VIEW March 29, 2022 7:52 AM     HISTORY: Pleural effusion.    COMPARISON: 2/24/2022.      Impression    IMPRESSION: Tunneled pleural catheter noted on the right. There  is  persistent right basilar airspace opacity and probable small effusion.  Left hemithorax is clear. No pneumothorax on either side.    REAL ALEXANDER MD         SYSTEM ID:  [id number]              Previous Cardiology Imaging   No results found for this or any previous visit (from the past 8760 hour(s)).             Again, thank you for allowing me to participate in the care of your patient.      Sincerely,    Dallin Agrawal MD

## 2022-03-29 NOTE — PROGRESS NOTES
Connor is a 41 year old who is being evaluated via a billable video visit.      How would you like to obtain your AVS? [employer]  If the video visit is dropped, the invitation should be resent by: Text to cell phone: [phone]   Will anyone else be joining your video visit? No  See note

## 2022-03-29 NOTE — PROGRESS NOTES
LUNG NODULE & INTERVENTIONAL PULMONARY CLINIC  [facility]     [facility] MRN# [id number]   Age: 41 year old YOB: 1980     Reason for Consultation: lung mass     Assessment and Plan:    1. Stage 4 NSCLC with malignant pleural effusion s/p pleurx. CTA chest with minimal residual effusion. Will plan to remove the pleurx within the next few weeks. He is agreeable.        Billing: I spent 45-59min (New, 72894) on the date of the encounter doing chart review, history and exam, documentation and further activities as described in this note.    Dallin Agrawal MD   of Medicine  Interventional Pulmonology  Department of Pulmonary, Allergy, Critical Care and Sleep Medicine   [facility]  Pager: [phone]          History:     Connor Emerson is a 41 year old male with sig h/o for NSCLC, GERD, HTN who is here for evaluation/followup of lung mass .    - No new resp sx or complaints. Denies dyspnea or cough.   - has malignant pleural effusion s/p pleurx placement. No drainage in the last week. CTA chest with small residual effusion and improved tumor burden and mediastinal LAD  - Personal hx of cancer: NSCLC  - Family hx of cancer: na  - Exposure hx: Denies asbestos or radon exposure   - Tobacco hx: Never smoker   - My interpretation of the images relevant for this visit includes: small right effusion   - My interpretation of the PFT's relevant for this visit includes: None     Other active medical problems include:   - Has DM2. Stable    - has HTN and GERD. Stable            Past Medical History:      Past Medical History:   Diagnosis Date     Atypical chest pain 12/02/2013     Complication of anesthesia      Diabetes (H)      GERD (gastroesophageal reflux disease) 12/02/2013     HTN (hypertension) 05/14/2012     HTN, goal below 140/90 07/02/2013     Insomnia 02/21/2012     Mediastinal lymphadenopathy       Migraine headache 07/02/2013     Migraine with aura, without mention of intractable migraine without mention of status migrainosus      Pneumonia            Past Surgical History:      Past Surgical History:   Procedure Laterality Date     BRONCHOSCOPY RIGID OR FLEXIBLE W/TRANSENDOSCOPIC ENDOBRONCHIAL ULTRASOUND GUIDED Bilateral 1/26/2022    Procedure: Right BRONCHOSCOPY, FIBEROPTIC, endobronchial ultrasound, pleural biopsy;  Surgeon: Dallin Agrawal MD;  Location[facility] OR     INJECT BLOCK MEDIAL BRANCH CERVICAL/THORACIC/LUMBAR       INSERT CHEST TUBE Right 2/16/2022    Procedure: INSERTION, CATHETER, INTERCOSTAL, FOR DRAINAGE;  Surgeon: Dallin Agrawal MD;  Location: [facility]     INSERT CHEST TUBE Right 3/9/2022    Procedure: INSERTION, CATHETER, INTERCOSTAL, FOR DRAINAGE;  Surgeon: Sushila Antonio MD;  Location: [facility]     ORTHOPEDIC SURGERY      Ganesh. Rotator cuff repair.     PLEUROSCOPY N/A 1/26/2022    Procedure: Pleuroscopy with Pleural Biopsy;  Surgeon: Dallin Agrawal MD;  Location: [facility] OR          Social History:     Social History     Tobacco Use     Smoking status: Never Smoker     Smokeless tobacco: Never Used   Substance Use Topics     Alcohol use: Yes     Alcohol/week: 0.0 standard drinks     Comment: social          Family History:     Family History   Problem Relation Age of Onset     Unknown/Adopted Mother      Uterine Cancer Mother      Unknown/Adopted Father      Unknown/Adopted Maternal Grandmother      Unknown/Adopted Maternal Grandfather      Stomach Cancer Maternal Grandfather      Unknown/Adopted Paternal Grandmother      Unknown/Adopted Paternal Grandfather      Melanoma Maternal Uncle            Allergies:      Allergies   Allergen Reactions     Vicodin [Hydrocodone-Acetaminophen] Nausea and Vomiting and GI Disturbance          Medications:     Current Outpatient Medications   Medication Sig     alcohol swab prep pads Use to swab area of injection/jabier as directed.     alectinib (ALECENSA)  150 MG CAPS Take 4 capsules (600 mg) by mouth 2 times daily (Patient not taking: Reported on 3/21/2022)     blood glucose (NO BRAND SPECIFIED) lancets standard Use to test blood sugar 1 times daily or as directed.     blood glucose (NO BRAND SPECIFIED) test strip Use to test blood sugar 2 times daily or as directed. To accompany: Blood Glucose Monitor Brands: per insurance.     blood glucose (NO BRAND SPECIFIED) test strip Use to test blood sugar 1 times daily or as directed.     blood glucose calibration (NO BRAND SPECIFIED) solution To accompany: Blood Glucose Monitor Brands: per insurance.     blood glucose monitoring (NO BRAND SPECIFIED) meter device kit Use to test blood sugar 2 times daily or as directed. Preferred blood glucose meter OR supplies to accompany: Blood Glucose Monitor Brands: per insurance.     blood glucose monitoring (NO BRAND SPECIFIED) meter device kit Use to test blood sugar 1 times daily or as directed.     famotidine (PEPCID) 40 MG tablet Take 40 mg by mouth daily     insulin glargine (LANTUS PEN) 100 UNIT/ML pen Inject 10 Units Subcutaneous At Bedtime     insulin glargine (LANTUS PEN) 100 UNIT/ML pen Inject 15 Units Subcutaneous At Bedtime     insulin pen needle (31G X 8 MM) 31G X 8 MM miscellaneous Use one pen needles daily or as directed.     metoclopramide (REGLAN) 5 MG tablet Take 1 tablet (5 mg) by mouth 3 times daily as needed (hiccups)     nystatin (MYCOSTATIN) 193825 UNIT/ML suspension Take 2 mLs (200,000 Units) by mouth 4 times daily     oxyCODONE (ROXICODONE) 5 MG tablet Take 1 tablet (5 mg) by mouth every 6 hours as needed for pain     prochlorperazine (COMPAZINE) 10 MG tablet Take 1 tablet (10 mg) by mouth every 6 hours as needed (Nausea/Vomiting)     thin (NO BRAND SPECIFIED) lancets Use with lanceting device. To accompany: Blood Glucose Monitor Brands: per insurance.     No current facility-administered medications for this visit.          Review of Systems:      CONSTITUTIONAL: negative for fever, chills, change in weight  INTEGUMENTARY/SKIN: no rash or obvious new lesions  ENT/MOUTH: no sore throat, new sinus pain or nasal drainage  RESP: see interval history  CV: negative for chest pain, palpitations or peripheral edema  GI: no nausea, vomiting, change in stools  : no dysuria  MUSCULOSKELETAL: no myalgias, arthralgias  ENDOCRINE: blood sugars with adequate control  PSYCHIATRIC: mood stable  LYMPHATIC: no new lymphadenopathy  HEME: no bleeding or easy bruisability  NEURO: no numbness, weakness, headaches         Physical Exam:     Constitutional - looks well, in no apparent distress  Eyes - no redness or discharge  Respiratory -breathing appears comfortable. No wheeze or rhonchi.   Cardiac -- Normal rate, rhythm.   Skin - No appreciable discoloration or lesions (very limited exam)  Neurological - No apparent tremors. Speech fluent and articlate  Psychiatric - no signs of delirium or anxiety          Current Laboratory Data:   All laboratory and imaging data reviewed.    Results for orders placed or performed during the hospital encounter of 03/29/22 (from the past 24 hour(s))   XR Chest 1 View    Narrative    CHEST ONE VIEW March 29, 2022 7:52 AM     HISTORY: Pleural effusion.    COMPARISON: 2/24/2022.      Impression    IMPRESSION: Tunneled pleural catheter noted on the right. There is  persistent right basilar airspace opacity and probable small effusion.  Left hemithorax is clear. No pneumothorax on either side.    REAL ALEXANDER MD         SYSTEM ID:  [id number]              Previous Cardiology Imaging   No results found for this or any previous visit (from the past 8760 hour(s)).

## 2022-03-30 NOTE — PROGRESS NOTES
"Pt called CO 3 issues;     1) hiccups for the past 3 days. Has tried both increasing and decreasing fluid intake with no results. I asked about an antacid like a tums as I had heard that works occasionally and he replied he only as taken Rx'd pepcid. Wonders if this is RT his disease.     2) Has thrush. Thick white coating on his tongue, gums and buccal areas. Not \"far down\" his throat yet. There for \"past few days\" not painful. Pt brushed a lot trying to get rid of it before googling and learning about thrush.     3) Really low on energy. Not activity tolerance, wakes up with low energy. States \" can barely move my legs\" and \"I've been sitting..abdominal tenderness work for 3 hours and haven't gotten hardly anything done because I have no energy\".       Said I would chat with you and get back to him, his preferred pharmacy is [facility]. If you are busy let me know what you think and I will get them Rx'd. Thanks!     Spoke with Dr Persaud. Rx'd Reglan for hiccups, agrees with trying a contact style antacid. Rx'd Nystatin for Steroid related Thrush. Notes fatigue is most likely RT Gamma Knife on 2/15 and this should receede. May want to contact Rad Onc for specifics on when this is expected and when/if intervention is warranted.    Called and relayed information to Pt, who verbalized understanding.    " 97.7

## 2022-03-31 ENCOUNTER — TELEPHONE (OUTPATIENT)
Dept: ONCOLOGY | Facility: CLINIC | Age: 42
End: 2022-03-31
Payer: MEDICAID

## 2022-03-31 NOTE — TELEPHONE ENCOUNTER
Oral Chemotherapy Monitoring Program     Received verbal OK to call and speak to wife in regards to ophthalmologist appt. Wife states she had not had a chance to make appt yet. She will call as soon as we hang up the phone and will call our OC line back to let us know when the appt is scheduled for.     Mai Aguilar, PharmD, BCACP  Oral Chemotherapy Monitoring Program  [facility]  [phone]  March 31, 2022

## 2022-03-31 NOTE — TELEPHONE ENCOUNTER
Oral Chemotherapy Monitoring Program     Placed call to Connor Emerson in follow up to see if he was able to get his ophthalmologist appt scheduled. Pt stated he has not yet made an appt as his schedule has been very busy, but he will call his wife now to have her call and schedule. I will plan to call him back this afternoon to see if there are any updates. He continues to state that his eyes have not improved much and in fact seem to be worse during the evening.     Mai Aguilar, PharmD, BCACP  Oral Chemotherapy Monitoring Program  [facility]  [phone]  March 31, 2022

## 2022-04-01 ENCOUNTER — TELEPHONE (OUTPATIENT)
Dept: ONCOLOGY | Facility: CLINIC | Age: 42
End: 2022-04-01
Payer: MEDICAID

## 2022-04-01 NOTE — ORAL ONC MGMT
Oral Chemotherapy Monitoring Program     Placed call to patient in follow up of oral chemotherapy. Left message requesting call back. No drug names were mentioned. Will update when response received.     Ayde Bateman, PharmD, BCOP  Hematology/Oncology Clinical Pharmacist  [facility]  [facility]

## 2022-04-01 NOTE — ORAL ONC MGMT
"Oral Chemotherapy Monitoring Program     Spoke to patient's wife regarding visual disturbances and Alecensa. She didn't have many details about whether the visual disturbances are getting worse, but she said \"it's pretty bad,\" and stated that Connor's not much of a complainer, so it's hard for her to assess. She said he has a very hard time seeing things close up and keeps squinting at everything.    Instructed her that Connor should hold the Alecensa over the weekend, per Dr Persaud.   They have an ophthalmology appt on Monday at 3. They also have labs on Monday. We will follow up after his labs and appointment. Candance expressed understanding of this and had no further questions.     Ayde Bateman, PharmD, BCOP  Hematology/Oncology Clinical Pharmacist  [facility]  [facility]    "

## 2022-04-02 ENCOUNTER — HOSPITAL ENCOUNTER (INPATIENT)
Facility: CLINIC | Age: 42
LOS: 3 days | Discharge: HOME | End: 2022-04-05
Attending: EMERGENCY MEDICINE | Admitting: HOSPITALIST
Payer: MEDICAID

## 2022-04-02 ENCOUNTER — APPOINTMENT (OUTPATIENT)
Dept: INTERVENTIONAL RADIOLOGY/VASCULAR | Facility: CLINIC | Age: 42
End: 2022-04-02
Attending: EMERGENCY MEDICINE
Payer: MEDICAID

## 2022-04-02 ENCOUNTER — APPOINTMENT (OUTPATIENT)
Dept: CT IMAGING | Facility: CLINIC | Age: 42
End: 2022-04-02
Attending: EMERGENCY MEDICINE
Payer: MEDICAID

## 2022-04-02 ENCOUNTER — HOSPITAL ENCOUNTER (EMERGENCY)
Facility: CLINIC | Age: 42
Discharge: ANOTHER HOSPITAL | End: 2022-04-02
Attending: EMERGENCY MEDICINE | Admitting: EMERGENCY MEDICINE
Payer: MEDICAID

## 2022-04-02 VITALS
HEIGHT: 69 IN | RESPIRATION RATE: 16 BRPM | BODY MASS INDEX: 30.36 KG/M2 | TEMPERATURE: 99.8 F | DIASTOLIC BLOOD PRESSURE: 75 MMHG | HEART RATE: 86 BPM | WEIGHT: 205 LBS | SYSTOLIC BLOOD PRESSURE: 122 MMHG | OXYGEN SATURATION: 99 %

## 2022-04-02 DIAGNOSIS — R65.20 SEVERE SEPSIS (H): ICD-10-CM

## 2022-04-02 DIAGNOSIS — A41.9 SEVERE SEPSIS (H): ICD-10-CM

## 2022-04-02 DIAGNOSIS — Z79.4 TYPE 2 DIABETES MELLITUS WITH HYPERGLYCEMIA, WITH LONG-TERM CURRENT USE OF INSULIN (H): ICD-10-CM

## 2022-04-02 DIAGNOSIS — E11.65 TYPE 2 DIABETES MELLITUS WITH HYPERGLYCEMIA, WITH LONG-TERM CURRENT USE OF INSULIN (H): ICD-10-CM

## 2022-04-02 DIAGNOSIS — T85.9XXA COMPLICATION OF CHEST TUBE, INITIAL ENCOUNTER: ICD-10-CM

## 2022-04-02 DIAGNOSIS — L02.213 CHEST WALL ABSCESS: ICD-10-CM

## 2022-04-02 LAB
ALBUMIN SERPL-MCNC: 3.6 G/DL (ref 3.4–5)
ALP SERPL-CCNC: 127 U/L (ref 40–150)
ALT SERPL W P-5'-P-CCNC: 20 U/L (ref 0–70)
ANION GAP SERPL CALCULATED.3IONS-SCNC: 5 MMOL/L (ref 3–14)
AST SERPL W P-5'-P-CCNC: 12 U/L (ref 0–45)
BASOPHILS # BLD AUTO: 0.1 10E3/UL (ref 0–0.2)
BASOPHILS NFR BLD AUTO: 0 %
BILIRUB SERPL-MCNC: 1.2 MG/DL (ref 0.2–1.3)
BUN SERPL-MCNC: 9 MG/DL (ref 7–30)
CALCIUM SERPL-MCNC: 8.8 MG/DL (ref 8.5–10.1)
CHLORIDE BLD-SCNC: 103 MMOL/L (ref 94–109)
CO2 SERPL-SCNC: 25 MMOL/L (ref 20–32)
CREAT SERPL-MCNC: 0.54 MG/DL (ref 0.66–1.25)
EOSINOPHIL # BLD AUTO: 0 10E3/UL (ref 0–0.7)
EOSINOPHIL NFR BLD AUTO: 0 %
ERYTHROCYTE [DISTWIDTH] IN BLOOD BY AUTOMATED COUNT: 14.6 % (ref 10–15)
GFR SERPL CREATININE-BSD FRML MDRD: >90 ML/MIN/1.73M2
GLUCOSE BLD-MCNC: 357 MG/DL (ref 70–99)
GLUCOSE BLDC GLUCOMTR-MCNC: 315 MG/DL (ref 70–99)
GLUCOSE BLDC GLUCOMTR-MCNC: 337 MG/DL (ref 70–99)
GRAM STAIN RESULT: ABNORMAL
GRAM STAIN RESULT: ABNORMAL
HCO3 BLDV-SCNC: 26 MMOL/L (ref 21–28)
HCT VFR BLD AUTO: 42.3 % (ref 40–53)
HGB BLD-MCNC: 13.9 G/DL (ref 13.3–17.7)
HOLD SPECIMEN: NORMAL
IMM GRANULOCYTES # BLD: 0.1 10E3/UL
IMM GRANULOCYTES NFR BLD: 0 %
INR PPP: 1.03 (ref 0.85–1.15)
LACTATE BLD-SCNC: 1.1 MMOL/L
LACTATE SERPL-SCNC: 2.9 MMOL/L (ref 0.7–2)
LYMPHOCYTES # BLD AUTO: 2.1 10E3/UL (ref 0.8–5.3)
LYMPHOCYTES NFR BLD AUTO: 9 %
MCH RBC QN AUTO: 30.2 PG (ref 26.5–33)
MCHC RBC AUTO-ENTMCNC: 32.9 G/DL (ref 31.5–36.5)
MCV RBC AUTO: 92 FL (ref 78–100)
MONOCYTES # BLD AUTO: 1 10E3/UL (ref 0–1.3)
MONOCYTES NFR BLD AUTO: 5 %
NEUTROPHILS # BLD AUTO: 19.4 10E3/UL (ref 1.6–8.3)
NEUTROPHILS NFR BLD AUTO: 86 %
NRBC # BLD AUTO: 0 10E3/UL
NRBC BLD AUTO-RTO: 0 /100
PCO2 BLDV: 41 MM HG (ref 40–50)
PH BLDV: 7.4 [PH] (ref 7.32–7.43)
PLATELET # BLD AUTO: 229 10E3/UL (ref 150–450)
PO2 BLDV: 55 MM HG (ref 25–47)
POTASSIUM BLD-SCNC: 3.7 MMOL/L (ref 3.4–5.3)
PROT SERPL-MCNC: 6.5 G/DL (ref 6.8–8.8)
RBC # BLD AUTO: 4.6 10E6/UL (ref 4.4–5.9)
SAO2 % BLDV: 88 % (ref 94–100)
SARS-COV-2 RNA RESP QL NAA+PROBE: NEGATIVE
SODIUM SERPL-SCNC: 133 MMOL/L (ref 133–144)
WBC # BLD AUTO: 22.7 10E3/UL (ref 4–11)

## 2022-04-02 PROCEDURE — 99221 1ST HOSP IP/OBS SF/LOW 40: CPT | Mod: AI | Performed by: HOSPITALIST

## 2022-04-02 PROCEDURE — 250N000012 HC RX MED GY IP 250 OP 636 PS 637: Performed by: HOSPITALIST

## 2022-04-02 PROCEDURE — 36415 COLL VENOUS BLD VENIPUNCTURE: CPT | Performed by: EMERGENCY MEDICINE

## 2022-04-02 PROCEDURE — 71260 CT THORAX DX C+: CPT

## 2022-04-02 PROCEDURE — C9803 HOPD COVID-19 SPEC COLLECT: HCPCS

## 2022-04-02 PROCEDURE — 250N000011 HC RX IP 250 OP 636: Performed by: HOSPITALIST

## 2022-04-02 PROCEDURE — 99285 EMERGENCY DEPT VISIT HI MDM: CPT | Mod: 25

## 2022-04-02 PROCEDURE — 87040 BLOOD CULTURE FOR BACTERIA: CPT | Performed by: EMERGENCY MEDICINE

## 2022-04-02 PROCEDURE — 87077 CULTURE AEROBIC IDENTIFY: CPT | Performed by: RADIOLOGY

## 2022-04-02 PROCEDURE — 250N000011 HC RX IP 250 OP 636: Performed by: EMERGENCY MEDICINE

## 2022-04-02 PROCEDURE — 80053 COMPREHEN METABOLIC PANEL: CPT | Performed by: EMERGENCY MEDICINE

## 2022-04-02 PROCEDURE — 0WP930Z REMOVAL OF DRAINAGE DEVICE FROM RIGHT PLEURAL CAVITY, PERCUTANEOUS APPROACH: ICD-10-PCS | Performed by: RADIOLOGY

## 2022-04-02 PROCEDURE — 82803 BLOOD GASES ANY COMBINATION: CPT

## 2022-04-02 PROCEDURE — 96375 TX/PRO/DX INJ NEW DRUG ADDON: CPT

## 2022-04-02 PROCEDURE — 272N000602 HC WOUND GLUE CR1

## 2022-04-02 PROCEDURE — 85025 COMPLETE CBC W/AUTO DIFF WBC: CPT | Performed by: EMERGENCY MEDICINE

## 2022-04-02 PROCEDURE — 87205 SMEAR GRAM STAIN: CPT | Performed by: RADIOLOGY

## 2022-04-02 PROCEDURE — 120N000001 HC R&B MED SURG/OB

## 2022-04-02 PROCEDURE — 85610 PROTHROMBIN TIME: CPT | Performed by: EMERGENCY MEDICINE

## 2022-04-02 PROCEDURE — 83605 ASSAY OF LACTIC ACID: CPT | Performed by: EMERGENCY MEDICINE

## 2022-04-02 PROCEDURE — 250N000011 HC RX IP 250 OP 636: Performed by: RADIOLOGY

## 2022-04-02 PROCEDURE — 250N000009 HC RX 250: Performed by: RADIOLOGY

## 2022-04-02 PROCEDURE — 96367 TX/PROPH/DG ADDL SEQ IV INF: CPT

## 2022-04-02 PROCEDURE — 96374 THER/PROPH/DIAG INJ IV PUSH: CPT

## 2022-04-02 PROCEDURE — 87635 SARS-COV-2 COVID-19 AMP PRB: CPT | Performed by: EMERGENCY MEDICINE

## 2022-04-02 PROCEDURE — 99207 PR CDG-EXAM COMPONENT: MEETS EXP PROBLEM FOCUSED - DOWN CODED: CPT | Performed by: HOSPITALIST

## 2022-04-02 PROCEDURE — 96365 THER/PROPH/DIAG IV INF INIT: CPT | Mod: 59

## 2022-04-02 PROCEDURE — 250N000013 HC RX MED GY IP 250 OP 250 PS 637: Performed by: HOSPITALIST

## 2022-04-02 PROCEDURE — 0H95XZZ DRAINAGE OF CHEST SKIN, EXTERNAL APPROACH: ICD-10-PCS | Performed by: RADIOLOGY

## 2022-04-02 PROCEDURE — 258N000003 HC RX IP 258 OP 636: Performed by: EMERGENCY MEDICINE

## 2022-04-02 PROCEDURE — 99152 MOD SED SAME PHYS/QHP 5/>YRS: CPT

## 2022-04-02 RX ORDER — NICOTINE POLACRILEX 4 MG
15-30 LOZENGE BUCCAL
Status: DISCONTINUED | OUTPATIENT
Start: 2022-04-02 | End: 2022-04-05 | Stop reason: HOSPADM

## 2022-04-02 RX ORDER — CEFAZOLIN SODIUM 1 G/50ML
2000 SOLUTION INTRAVENOUS ONCE
Status: COMPLETED | OUTPATIENT
Start: 2022-04-02 | End: 2022-04-02

## 2022-04-02 RX ORDER — NALOXONE HYDROCHLORIDE 0.4 MG/ML
0.2 INJECTION, SOLUTION INTRAMUSCULAR; INTRAVENOUS; SUBCUTANEOUS
Status: DISCONTINUED | OUTPATIENT
Start: 2022-04-02 | End: 2022-04-02 | Stop reason: HOSPADM

## 2022-04-02 RX ORDER — NALOXONE HYDROCHLORIDE 0.4 MG/ML
0.4 INJECTION, SOLUTION INTRAMUSCULAR; INTRAVENOUS; SUBCUTANEOUS
Status: DISCONTINUED | OUTPATIENT
Start: 2022-04-02 | End: 2022-04-02 | Stop reason: HOSPADM

## 2022-04-02 RX ORDER — ACETAMINOPHEN 650 MG/1
650 SUPPOSITORY RECTAL EVERY 6 HOURS PRN
Status: DISCONTINUED | OUTPATIENT
Start: 2022-04-02 | End: 2022-04-05 | Stop reason: HOSPADM

## 2022-04-02 RX ORDER — ONDANSETRON 2 MG/ML
4 INJECTION INTRAMUSCULAR; INTRAVENOUS ONCE
Status: COMPLETED | OUTPATIENT
Start: 2022-04-02 | End: 2022-04-02

## 2022-04-02 RX ORDER — LIDOCAINE 40 MG/G
CREAM TOPICAL
Status: DISCONTINUED | OUTPATIENT
Start: 2022-04-02 | End: 2022-04-05 | Stop reason: HOSPADM

## 2022-04-02 RX ORDER — PROCHLORPERAZINE MALEATE 10 MG
10 TABLET ORAL EVERY 6 HOURS PRN
Status: DISCONTINUED | OUTPATIENT
Start: 2022-04-02 | End: 2022-04-05 | Stop reason: HOSPADM

## 2022-04-02 RX ORDER — NALOXONE HYDROCHLORIDE 0.4 MG/ML
0.2 INJECTION, SOLUTION INTRAMUSCULAR; INTRAVENOUS; SUBCUTANEOUS
Status: DISCONTINUED | OUTPATIENT
Start: 2022-04-02 | End: 2022-04-05 | Stop reason: HOSPADM

## 2022-04-02 RX ORDER — FAMOTIDINE 20 MG/1
40 TABLET, FILM COATED ORAL DAILY
Status: DISCONTINUED | OUTPATIENT
Start: 2022-04-03 | End: 2022-04-05 | Stop reason: HOSPADM

## 2022-04-02 RX ORDER — ONDANSETRON 4 MG/1
4 TABLET, ORALLY DISINTEGRATING ORAL EVERY 6 HOURS PRN
Status: DISCONTINUED | OUTPATIENT
Start: 2022-04-02 | End: 2022-04-05 | Stop reason: HOSPADM

## 2022-04-02 RX ORDER — ONDANSETRON 2 MG/ML
4 INJECTION INTRAMUSCULAR; INTRAVENOUS EVERY 6 HOURS PRN
Status: DISCONTINUED | OUTPATIENT
Start: 2022-04-02 | End: 2022-04-05 | Stop reason: HOSPADM

## 2022-04-02 RX ORDER — AMOXICILLIN 250 MG
1 CAPSULE ORAL 2 TIMES DAILY PRN
Status: DISCONTINUED | OUTPATIENT
Start: 2022-04-02 | End: 2022-04-05 | Stop reason: HOSPADM

## 2022-04-02 RX ORDER — OXYCODONE HYDROCHLORIDE 5 MG/1
5 TABLET ORAL EVERY 6 HOURS PRN
Status: DISCONTINUED | OUTPATIENT
Start: 2022-04-02 | End: 2022-04-05 | Stop reason: HOSPADM

## 2022-04-02 RX ORDER — ACETAMINOPHEN 325 MG/1
650 TABLET ORAL EVERY 6 HOURS PRN
Status: DISCONTINUED | OUTPATIENT
Start: 2022-04-02 | End: 2022-04-05 | Stop reason: HOSPADM

## 2022-04-02 RX ORDER — NALOXONE HYDROCHLORIDE 0.4 MG/ML
0.4 INJECTION, SOLUTION INTRAMUSCULAR; INTRAVENOUS; SUBCUTANEOUS
Status: DISCONTINUED | OUTPATIENT
Start: 2022-04-02 | End: 2022-04-05 | Stop reason: HOSPADM

## 2022-04-02 RX ORDER — IOPAMIDOL 755 MG/ML
100 INJECTION, SOLUTION INTRAVASCULAR ONCE
Status: COMPLETED | OUTPATIENT
Start: 2022-04-02 | End: 2022-04-02

## 2022-04-02 RX ORDER — DEXTROSE MONOHYDRATE 25 G/50ML
25-50 INJECTION, SOLUTION INTRAVENOUS
Status: DISCONTINUED | OUTPATIENT
Start: 2022-04-02 | End: 2022-04-05 | Stop reason: HOSPADM

## 2022-04-02 RX ORDER — PIPERACILLIN SODIUM, TAZOBACTAM SODIUM 3; .375 G/15ML; G/15ML
3.38 INJECTION, POWDER, LYOPHILIZED, FOR SOLUTION INTRAVENOUS EVERY 6 HOURS
Status: DISCONTINUED | OUTPATIENT
Start: 2022-04-02 | End: 2022-04-04

## 2022-04-02 RX ORDER — FENTANYL CITRATE 50 UG/ML
25-50 INJECTION, SOLUTION INTRAMUSCULAR; INTRAVENOUS EVERY 5 MIN PRN
Status: DISCONTINUED | OUTPATIENT
Start: 2022-04-02 | End: 2022-04-02 | Stop reason: HOSPADM

## 2022-04-02 RX ORDER — NYSTATIN 100000/ML
200000 SUSPENSION, ORAL (FINAL DOSE FORM) ORAL 4 TIMES DAILY
Status: DISCONTINUED | OUTPATIENT
Start: 2022-04-02 | End: 2022-04-02

## 2022-04-02 RX ORDER — PROCHLORPERAZINE 25 MG
25 SUPPOSITORY, RECTAL RECTAL EVERY 12 HOURS PRN
Status: DISCONTINUED | OUTPATIENT
Start: 2022-04-02 | End: 2022-04-05 | Stop reason: HOSPADM

## 2022-04-02 RX ORDER — AMOXICILLIN 250 MG
2 CAPSULE ORAL 2 TIMES DAILY PRN
Status: DISCONTINUED | OUTPATIENT
Start: 2022-04-02 | End: 2022-04-05 | Stop reason: HOSPADM

## 2022-04-02 RX ORDER — HYDROMORPHONE HYDROCHLORIDE 1 MG/ML
0.5 INJECTION, SOLUTION INTRAMUSCULAR; INTRAVENOUS; SUBCUTANEOUS
Status: DISCONTINUED | OUTPATIENT
Start: 2022-04-02 | End: 2022-04-02

## 2022-04-02 RX ORDER — METOCLOPRAMIDE 5 MG/1
5 TABLET ORAL 3 TIMES DAILY PRN
Status: DISCONTINUED | OUTPATIENT
Start: 2022-04-02 | End: 2022-04-05 | Stop reason: HOSPADM

## 2022-04-02 RX ORDER — HYDROMORPHONE HYDROCHLORIDE 1 MG/ML
0.5 INJECTION, SOLUTION INTRAMUSCULAR; INTRAVENOUS; SUBCUTANEOUS
Status: DISCONTINUED | OUTPATIENT
Start: 2022-04-02 | End: 2022-04-02 | Stop reason: HOSPADM

## 2022-04-02 RX ORDER — FLUMAZENIL 0.1 MG/ML
0.2 INJECTION, SOLUTION INTRAVENOUS
Status: DISCONTINUED | OUTPATIENT
Start: 2022-04-02 | End: 2022-04-02 | Stop reason: HOSPADM

## 2022-04-02 RX ADMIN — FENTANYL CITRATE 50 MCG: 50 INJECTION, SOLUTION INTRAMUSCULAR; INTRAVENOUS at 17:03

## 2022-04-02 RX ADMIN — MIDAZOLAM HYDROCHLORIDE 0.5 MG: 1 INJECTION, SOLUTION INTRAMUSCULAR; INTRAVENOUS at 16:55

## 2022-04-02 RX ADMIN — MIDAZOLAM HYDROCHLORIDE 1 MG: 1 INJECTION, SOLUTION INTRAMUSCULAR; INTRAVENOUS at 17:03

## 2022-04-02 RX ADMIN — ONDANSETRON 4 MG: 2 INJECTION INTRAMUSCULAR; INTRAVENOUS at 11:31

## 2022-04-02 RX ADMIN — INSULIN ASPART 4 UNITS: 100 INJECTION, SOLUTION INTRAVENOUS; SUBCUTANEOUS at 20:06

## 2022-04-02 RX ADMIN — OXYCODONE HYDROCHLORIDE 5 MG: 5 TABLET ORAL at 20:53

## 2022-04-02 RX ADMIN — LIDOCAINE HYDROCHLORIDE 25 ML: 10 INJECTION, SOLUTION INFILTRATION; PERINEURAL at 17:13

## 2022-04-02 RX ADMIN — HYDROMORPHONE HYDROCHLORIDE 0.5 MG: 1 INJECTION, SOLUTION INTRAMUSCULAR; INTRAVENOUS; SUBCUTANEOUS at 11:32

## 2022-04-02 RX ADMIN — PIPERACILLIN SODIUM AND TAZOBACTAM SODIUM 3.38 G: 3; .375 INJECTION, POWDER, LYOPHILIZED, FOR SOLUTION INTRAVENOUS at 21:23

## 2022-04-02 RX ADMIN — VANCOMYCIN HYDROCHLORIDE 2000 MG: 1 INJECTION, POWDER, LYOPHILIZED, FOR SOLUTION INTRAVENOUS at 13:38

## 2022-04-02 RX ADMIN — SODIUM CHLORIDE, POTASSIUM CHLORIDE, SODIUM LACTATE AND CALCIUM CHLORIDE 1000 ML: 600; 310; 30; 20 INJECTION, SOLUTION INTRAVENOUS at 12:51

## 2022-04-02 RX ADMIN — FENTANYL CITRATE 25 MCG: 50 INJECTION, SOLUTION INTRAMUSCULAR; INTRAVENOUS at 16:55

## 2022-04-02 RX ADMIN — IOPAMIDOL 100 ML: 755 INJECTION, SOLUTION INTRAVENOUS at 12:20

## 2022-04-02 RX ADMIN — TAZOBACTAM SODIUM AND PIPERACILLIN SODIUM 4.5 G: 500; 4 INJECTION, SOLUTION INTRAVENOUS at 12:57

## 2022-04-02 ASSESSMENT — ENCOUNTER SYMPTOMS
FEVER: 0
SHORTNESS OF BREATH: 1
CHILLS: 1
ABDOMINAL PAIN: 1
FEVER: 0
CHILLS: 1

## 2022-04-02 ASSESSMENT — ACTIVITIES OF DAILY LIVING (ADL)
ADLS_ACUITY_SCORE: 7

## 2022-04-02 NOTE — PHARMACY-ADMISSION MEDICATION HISTORY
Pharmacy Medication History  Admission medication history interview status for the 4/2/2022  admission is complete. See EPIC admission navigator for prior to admission medications     Location of Interview: Patient room  Medication history sources: Patient and Surescripts    Significant changes made to the medication list:  lantus 10 units q AM  alectinib 3x 150mg bid    In the past week, patient estimated taking medication this percent of the time: greater than 90%    Additional medication history information:   Pt takes Lantus 10 units in the AM  Did not take his alectinib this AM  Pt states his oxycodone dose is 0.5mg but per dispense report it is likely 5mg IR    Medication reconciliation completed by provider prior to medication history? No    Time spent in this activity: 15min    Prior to Admission medications    Medication Sig Last Dose Taking? Auth Provider   alectinib (ALECENSA) 150 MG CAPS Take 450 mg by mouth 2 times daily  4/1/2022 at Unknown time Yes Bassam Persaud MD   famotidine (PEPCID) 40 MG tablet Take 40 mg by mouth daily prn at prn Yes Reported, Patient   insulin glargine (LANTUS PEN) 100 UNIT/ML pen Inject 10 Units Subcutaneous At Bedtime  Patient taking differently: Inject 10 Units Subcutaneous every morning  4/2/2022 at AM Yes Radha Shetty Ra, APRN CNP   metoclopramide (REGLAN) 5 MG tablet Take 1 tablet (5 mg) by mouth 3 times daily as needed (hiccups) prn at prn Yes Bassam Persaud MD   oxyCODONE (ROXICODONE) 5 MG tablet Take 1 tablet (5 mg) by mouth every 6 hours as needed for pain prn at prn Yes Bassam Persaud MD   prochlorperazine (COMPAZINE) 10 MG tablet Take 1 tablet (10 mg) by mouth every 6 hours as needed (Nausea/Vomiting) prn at prn Yes Bassam Persaud MD   alcohol swab prep pads Use to swab area of injection/jabier as directed.   Radha Shetty Ra, APRN CNP   blood glucose (NO BRAND SPECIFIED) lancets standard Use to test blood sugar 1 times daily or as  directed.   Jayden King PA-C   blood glucose (NO BRAND SPECIFIED) test strip Use to test blood sugar 2 times daily or as directed. To accompany: Blood Glucose Monitor Brands: per insurance.   Radha Shetty Ra, APRN CNP   blood glucose (NO BRAND SPECIFIED) test strip Use to test blood sugar 1 times daily or as directed.   Jayden King PA-C   blood glucose calibration (NO BRAND SPECIFIED) solution To accompany: Blood Glucose Monitor Brands: per insurance.   Radha Shetty Ra, APRN CNP   blood glucose monitoring (NO BRAND SPECIFIED) meter device kit Use to test blood sugar 2 times daily or as directed. Preferred blood glucose meter OR supplies to accompany: Blood Glucose Monitor Brands: per insurance.   Radha Shetty Ra, APRN CNP   blood glucose monitoring (NO BRAND SPECIFIED) meter device kit Use to test blood sugar 1 times daily or as directed.   Jayden King PA-C   insulin pen needle (31G X 8 MM) 31G X 8 MM miscellaneous Use one pen needles daily or as directed.   Jayden King PA-C   nystatin (MYCOSTATIN) 343489 UNIT/ML suspension Take 2 mLs (200,000 Units) by mouth 4 times daily  Patient not taking: Reported on 4/2/2022 Not Taking at Unknown time  Bassam Persaud MD   thin (NO BRAND SPECIFIED) lancets Use with lanceting device. To accompany: Blood Glucose Monitor Brands: per insurance.   Radha Shetty Ra, APRN CNP       The information provided in this note is only as accurate as the sources available at the time of update(s)

## 2022-04-02 NOTE — IR NOTE
RADIOLOGY POST PROCEDURE NOTE    Patient name: Connor Emerson  MRN: [id number]  : 1980    Pre-procedure diagnosis: Infected RIght Pleural PleurX catheter.  Post-procedure diagnosis: Same    Procedure Date/Time: 2022  5:34 PM  Procedure: Removal of right tunneled PleurX catheter.  Tip sent to lab for evaluation.  No complications.  I and D of raised area of erythema and pus drained, also with dressing placed.  Estimated blood loss: 8 ml  Specimen(s) collected with description:  Tunneled PleurX catheter, as described.    The patient tolerated the procedure well with no immediate complications.  Significant findings:  Please see above.    See imaging dictation for procedural details.    Provider name: Alex Adamson MD  Assistant(s):None

## 2022-04-02 NOTE — H&P
[facility]    History and Physical - Hospitalist Service       Date of Admission:  4/2/2022    Assessment & Plan       Connor Emerson is a 41 year old male admitted on 4/2/2022 for sepsis secondary to infected pleurex catheter.      He has a past medical history significant for metastatic lung cancer with mets to the brain, right-sided loculated pleural effusion status post Pleurx catheter, diabetes, hypertension, anxiety, GERD.    Severe sepsis 2/2 infected of right sided pleurex catheter   Loculated malignant pleural effusion  *Developed pain last night which worsened this am  * CT scan of chest reveals fluid and stranding does appear to track outside the pleural space into the chest wall along the pleural catheter. This may indicate spread of infection.  Plan  -- admit to inpatient   -- started on vanc and Zosyn at [facility] ED, will continue.   -- blood cultures obtained, follow up results  -- IR consulted, plan to remove catheter today  -- ID consult    Metastatic lung cancer with mets to the brain  -- s/p radiation and currrently on PO chemotherapy -alectinib 450 mg p.o. twice daily  -- follows with oncology at the [facility]  --Per patient, he was instructed to hold his p.o. chemotherapy drug at this time.  -- will hold Alectinib for now  --Patient to follow-up with the [facility] on discharge, he has an appointment coming up     DM-II  --Uncontrolled, Hgb A1c 10.1  patient admits he has been compliant in the past but just recently got back with his PCP and has now taken better control of his diabetes.    --Continue PTA Lantus 15 units  --Sliding scale insulin coverage    Hypertension  --Not on any meds at home, blood pressure currently controlled.  --Monitor blood pressure.    Anxiety  GERD.  -- noted. Not on any meds.        Diet:  Diabetic diet  DVT Prophylaxis: Pneumatic Compression Devices  Heart Catheter: Not present  Central Lines: None  Cardiac Monitoring: None  Code Status:   Full  "    Clinically Significant Risk Factors Present on Admission                  # Obesity: Estimated body mass index is 30.27 kg/m  as calculated from the following:    Height as of an earlier encounter on 4/2/22: 1.753 m (5' 9\").    Weight as of an earlier encounter on 4/2/22: 93 kg (205 lb).      Disposition Plan   Expected Discharge:  TBD  Anticipated discharge location:  Awaiting care coordination huddle  Delays:          The patient's care was discussed with the Patient.    Stefanie Linares MD  Hospitalist Service  [facility]  Securely message with the Vocera Web Console (learn more here)  Text page via [employer] Paging/Directory         ______________________________________________________________________    Chief Complaint   Chest wall pain    History is obtained from the patient    History of Present Illness   Connor Emerson is a 41 year old male with a history significant for metastatic lung cancer complicated by malignant pleural effusion status post Pleurx catheter placement who presented with chest wall pain this morning.    He has a past medical history significant for metastatic lung cancer with mets to the brain, right-sided loculated pleural effusion status post Pleurx catheter, diabetes, hypertension, anxiety, GERD.    Patient notes that he woke up this morning with chest wall pain along the site of the Pleurx catheter placement.  He will also reports associated chills but denies any fevers.  He notes abdominal pain which resolved after 2 episodes of nonbloody vomiting.  Decided to present to [facility] ED when his symptoms did not resolve.    In the ED, vitals revealed temperature 99.5  F, pulse 99, heart rate 104, blood pressure 144/91, SPO2 97% on room air.  BMP unremarkable, lactic 1.1, blood glucose 357, WBC 22.7.  Covid negative.CT chest revealed fluid and stranding tracks outside of the pleural space into the chest wall along the pleural cath.  This may indicate spread of " infection    Review of Systems    The 10 point Review of Systems is negative other than noted in the HPI or here.     Past Medical History    I have reviewed this patient's medical history and updated it with pertinent information if needed.   Past Medical History:   Diagnosis Date     Atypical chest pain 12/02/2013     Complication of anesthesia      Diabetes (H)      GERD (gastroesophageal reflux disease) 12/02/2013     HTN (hypertension) 05/14/2012     HTN, goal below 140/90 07/02/2013     Insomnia 02/21/2012     Mediastinal lymphadenopathy      Migraine headache 07/02/2013     Migraine with aura, without mention of intractable migraine without mention of status migrainosus      Pneumonia        Past Surgical History   I have reviewed this patient's surgical history and updated it with pertinent information if needed.  Past Surgical History:   Procedure Laterality Date     BRONCHOSCOPY RIGID OR FLEXIBLE W/TRANSENDOSCOPIC ENDOBRONCHIAL ULTRASOUND GUIDED Bilateral 1/26/2022    Procedure: Right BRONCHOSCOPY, FIBEROPTIC, endobronchial ultrasound, pleural biopsy;  Surgeon: Dallin Agrawal MD;  Location: [facility] OR     INJECT BLOCK MEDIAL BRANCH CERVICAL/THORACIC/LUMBAR       INSERT CHEST TUBE Right 2/16/2022    Procedure: INSERTION, CATHETER, INTERCOSTAL, FOR DRAINAGE;  Surgeon: Dallin Agrawal MD;  Location: [facility] GI     INSERT CHEST TUBE Right 3/9/2022    Procedure: INSERTION, CATHETER, INTERCOSTAL, FOR DRAINAGE;  Surgeon: Sushila Antonio MD;  Location: [facility] GI     ORTHOPEDIC SURGERY      Ganesh. Rotator cuff repair.     PLEUROSCOPY N/A 1/26/2022    Procedure: Pleuroscopy with Pleural Biopsy;  Surgeon: Dallin Agrawal MD;  Location: [facility] OR       Social History   I have reviewed this patient's social history and updated it with pertinent information if needed.  Social History     Tobacco Use     Smoking status: Never Smoker     Smokeless tobacco: Never Used   Substance Use Topics     Alcohol use: Yes     Alcohol/week: 0.0  standard drinks     Comment: social     Drug use: No       Family History   I have reviewed this patient's family history and updated it with pertinent information if needed.  Family History   Problem Relation Age of Onset     Unknown/Adopted Mother      Uterine Cancer Mother      Unknown/Adopted Father      Unknown/Adopted Maternal Grandmother      Unknown/Adopted Maternal Grandfather      Stomach Cancer Maternal Grandfather      Unknown/Adopted Paternal Grandmother      Unknown/Adopted Paternal Grandfather      Melanoma Maternal Uncle        Prior to Admission Medications   Prior to Admission Medications   Prescriptions Last Dose Informant Patient Reported? Taking?   alcohol swab prep pads   No No   Sig: Use to swab area of injection/jabier as directed.   alectinib (ALECENSA) 150 MG CAPS   Yes No   Sig: Take 4 capsules (600 mg) by mouth 2 times daily   Patient not taking: Reported on 3/21/2022   blood glucose (NO BRAND SPECIFIED) lancets standard   No No   Sig: Use to test blood sugar 1 times daily or as directed.   blood glucose (NO BRAND SPECIFIED) test strip   No No   Sig: Use to test blood sugar 1 times daily or as directed.   blood glucose (NO BRAND SPECIFIED) test strip   No No   Sig: Use to test blood sugar 2 times daily or as directed. To accompany: Blood Glucose Monitor Brands: per insurance.   blood glucose calibration (NO BRAND SPECIFIED) solution   No No   Sig: To accompany: Blood Glucose Monitor Brands: per insurance.   blood glucose monitoring (NO BRAND SPECIFIED) meter device kit   No No   Sig: Use to test blood sugar 1 times daily or as directed.   blood glucose monitoring (NO BRAND SPECIFIED) meter device kit   No No   Sig: Use to test blood sugar 2 times daily or as directed. Preferred blood glucose meter OR supplies to accompany: Blood Glucose Monitor Brands: per insurance.   famotidine (PEPCID) 40 MG tablet   Yes No   Sig: Take 40 mg by mouth daily   insulin glargine (LANTUS PEN) 100 UNIT/ML pen    No No   Sig: Inject 15 Units Subcutaneous At Bedtime   insulin glargine (LANTUS PEN) 100 UNIT/ML pen   No No   Sig: Inject 10 Units Subcutaneous At Bedtime   insulin pen needle (31G X 8 MM) 31G X 8 MM miscellaneous   No No   Sig: Use one pen needles daily or as directed.   metoclopramide (REGLAN) 5 MG tablet   No No   Sig: Take 1 tablet (5 mg) by mouth 3 times daily as needed (hiccups)   nystatin (MYCOSTATIN) 130876 UNIT/ML suspension   No No   Sig: Take 2 mLs (200,000 Units) by mouth 4 times daily   oxyCODONE (ROXICODONE) 5 MG tablet   No No   Sig: Take 1 tablet (5 mg) by mouth every 6 hours as needed for pain   prochlorperazine (COMPAZINE) 10 MG tablet   No No   Sig: Take 1 tablet (10 mg) by mouth every 6 hours as needed (Nausea/Vomiting)   thin (NO BRAND SPECIFIED) lancets   No No   Sig: Use with lanceting device. To accompany: Blood Glucose Monitor Brands: per insurance.      Facility-Administered Medications: None     Allergies   Allergies   Allergen Reactions     Vicodin [Hydrocodone-Acetaminophen] Nausea and Vomiting and GI Disturbance       Physical Exam   Vital Signs: Temp: 99.5  F (37.5  C) Temp src: Oral BP: 125/80 Pulse: 99   Resp: 18 SpO2: 97 % O2 Device: None (Room air)    Weight: 0 lbs 0 oz    General Appearance: Well appearing for stated age.  Respiratory: CTAB, no rales or ronchi, right sided chest wall pain along the tract of pleurecx catheter.  Cardiovascular: S1, S2 normal, no murmurs  GI: non-tender on palpation, BS present      Data   Data reviewed today: I reviewed all medications, new labs and imaging results over the last 24 hours. I personally reviewed the CT chest  image(s) showing stranding along the chest wall at the site of the catheter. .    Recent Labs   Lab 04/02/22  1115 04/02/22  1114   WBC 22.7*  --    HGB 13.9  --    MCV 92  --      --    INR  --  1.03   NA  --  133   POTASSIUM  --  3.7   CHLORIDE  --  103   CO2  --  25   BUN  --  9   CR  --  0.54*   ANIONGAP  --  5    TORY  --  8.8   GLC  --  357*   ALBUMIN  --  3.6   PROTTOTAL  --  6.5*   BILITOTAL  --  1.2   ALKPHOS  --  127   ALT  --  20   AST  --  12     Recent Results (from the past 24 hour(s))   CT Chest w Contrast    Narrative    EXAM: CT CHEST WITH CONTRAST  LOCATION: [facility]  DATE/TIME: 04/02/2022, 12:14 PM    INDICATION: Right-sided Pleurx catheter. Appears to have erythema infection posterior to insertion site. Evaluate abscess and catheter location.  COMPARISON: None.  TECHNIQUE: CT chest with IV contrast. Multiplanar reformats were obtained. Dose reduction techniques were used.    CONTRAST: 100 mL Isovue 370.    FINDINGS:   LUNGS AND PLEURA: Loculated effusion on the right with Pleurx catheter in place. Some associated compressive atelectasis and/or infiltrate. Fluid does appear to track along the catheter into the chest wall.    MEDIASTINUM/AXILLAE: A few mildly prominent lymph nodes are noted which are nonspecific and may be reactive in this setting. No aneurysm.    CORONARY ARTERY CALCIFICATION: None.    UPPER ABDOMEN: No acute findings.    MUSCULOSKELETAL: No frankly destructive bony lesions.      Impression    IMPRESSION:   1.  Pleural catheter remains in the pleural space, however, fluid and stranding does appear to track outside the pleural space into the chest wall along the pleural catheter. This may indicate spread of infection.

## 2022-04-02 NOTE — ED TRIAGE NOTES
Pt presents for chest pain and shortness of breath since last night at approx 0330. Pt states progressive increasing shortness of breath. Currently being treated for lung cancer. ABC intact, A&Ox4.

## 2022-04-02 NOTE — PROGRESS NOTES
RECEIVING UNIT ED HANDOFF REVIEW    ED Nurse Handoff Report was reviewed by: Oumou Sandoval RN on April 2, 2022 at 6:15 PM

## 2022-04-02 NOTE — IR NOTE
Interventional Radiology Intra-procedural Nursing Note    Patient Name: Connor Emerson  Medical Record Number: [id number]  Today's Date: April 2, 2022    Start Time: 1656  End of procedure time: 1713  Procedure: Tunneled Right Pleurex Catheter removal with iv moderate sedation  Report given to: Reggie UREÑA RN  Time pt departs:  1720 to ED 1      Other Notes: right pleurex catheter removed and tip sent to lab  meds given  Lidocaine 1% intradermal 25 ml  Versed IVP 1.5 mg  Fentanyl IVP 75 mcg    Last sedation dose 1703    Alton Briscoe RN

## 2022-04-02 NOTE — ED NOTES
Bed: ED01  Expected date:   Expected time:   Means of arrival:   Comments:  [facility] from [facility] 41M sepsis from chest wound

## 2022-04-02 NOTE — ED NOTES
[facility]  ED Nurse Handoff Report    ED Chief complaint: Wound Check (PT SENT HERE FROM [facility] ER to be treated for infection from drain in right chest that was placed  after surgery for lung cancer)      ED Diagnosis:   Final diagnoses:   None       Code Status: Full Code    Allergies:   Allergies   Allergen Reactions     Vicodin [Hydrocodone-Acetaminophen] Nausea and Vomiting and GI Disturbance       Patient Story: Hx of lung cancer and diabetes type 2. Went to [facility] r/t chest pain from abscess on the R flank of the chest.  Focused Assessment:  Alert and oriented x4. In 2L of O2 via NC at baseline. Dilaudid dropped his blood pressure at [facility] and made him feel sick, therefore, the patient is trying to avoid this medication. Ambulatory    Labs Ordered and Resulted from Time of ED Arrival to Time of ED Departure   ISTAT GASES LACTATE VENOUS POCT - Abnormal       Result Value    Lactic Acid POCT 1.1      Bicarbonate Venous POCT 26      O2 Sat, Venous POCT 88 (*)     pCO2V Venous POCT 41      pH Venous POCT 7.40      pO2 Venous POCT 55 (*)    BLOOD CULTURE     IR Chest Tube Removal Tunneled Right    (Results Pending)         Treatments and/or interventions provided: Abx prior to arrival  Patient's response to treatments and/or interventions: Tolerated wel    To be done/followed up on inpatient unit:  Continue with plan of care per admitting MD.      Does this patient have any cognitive concerns?: N/A    Activity level - Baseline/Home:  Independent  Activity Level - Current:   Independent    Patient's Preferred language: English   Needed?: No    Isolation: None  Infection: Not Applicable  Patient tested for COVID 19 prior to admission: YES  Bariatric?: No    Vital Signs:   Vitals:    04/02/22 1449 04/02/22 1500   BP: (!) 144/91 (!) 129/92   Pulse: 99 99   Resp:  18   Temp: 99.5  F (37.5  C)    TempSrc: Oral    SpO2: 97% 97%       Cardiac Rhythm:     Was the PSS-3 completed:   Yes  What  interventions are required if any?               Family Comments: No family at bedside  OBS brochure/video discussed/provided to patient/family: N/A                For the majority of the shift this patient's behavior was Green.       ED NURSE PHONE NUMBER: *[phone]

## 2022-04-02 NOTE — ED PROVIDER NOTES
History     Chief Complaint:  Wound Check       HPI   Connor Emerson is a 41 year old male with history of lung cancer with metastatic disease to the brain and diabetes who presents to the emergency department as a transfer from [facility] emergency department for infection of a drainage tube.  He had a drain tube placed related to his cancer treatment and began having chills and increasing pain over the past several days.  He was found to have evidence of severe sepsis and interventional radiology was contacted and requested that he be transferred here so they can intervene upon this drainage tube.  He reports that he is having some pain.  He is not had any fevers.  No vomiting or diarrhea today.  He did have some vomiting earlier.  He denies other concerns.  He has not taken his oral chemotherapeutic for approximately 3 days.    Allergies:  Allergies   Allergen Reactions     Vicodin [Hydrocodone-Acetaminophen] Nausea and Vomiting and GI Disturbance        Medications:    Current Facility-Administered Medications   Medication     sodium chloride (PF) 0.9% PF flush 3 mL     sodium chloride (PF) 0.9% PF flush 3 mL     Current Outpatient Medications   Medication     alcohol swab prep pads     alectinib (ALECENSA) 150 MG CAPS     blood glucose (NO BRAND SPECIFIED) lancets standard     blood glucose (NO BRAND SPECIFIED) test strip     blood glucose (NO BRAND SPECIFIED) test strip     blood glucose calibration (NO BRAND SPECIFIED) solution     blood glucose monitoring (NO BRAND SPECIFIED) meter device kit     blood glucose monitoring (NO BRAND SPECIFIED) meter device kit     famotidine (PEPCID) 40 MG tablet     insulin glargine (LANTUS PEN) 100 UNIT/ML pen     insulin glargine (LANTUS PEN) 100 UNIT/ML pen     insulin pen needle (31G X 8 MM) 31G X 8 MM miscellaneous     metoclopramide (REGLAN) 5 MG tablet     nystatin (MYCOSTATIN) 795859 UNIT/ML suspension     oxyCODONE (ROXICODONE) 5 MG tablet     prochlorperazine  (COMPAZINE) 10 MG tablet     thin (NO BRAND SPECIFIED) lancets        Past Medical History:    Past Medical History:   Diagnosis Date     Atypical chest pain 12/02/2013     Complication of anesthesia      Diabetes (H)      GERD (gastroesophageal reflux disease) 12/02/2013     HTN (hypertension) 05/14/2012     HTN, goal below 140/90 07/02/2013     Insomnia 02/21/2012     Mediastinal lymphadenopathy      Migraine headache 07/02/2013     Migraine with aura, without mention of intractable migraine without mention of status migrainosus      Pneumonia         Past Surgical History:    Past Surgical History:   Procedure Laterality Date     BRONCHOSCOPY RIGID OR FLEXIBLE W/TRANSENDOSCOPIC ENDOBRONCHIAL ULTRASOUND GUIDED Bilateral 1/26/2022    Procedure: Right BRONCHOSCOPY, FIBEROPTIC, endobronchial ultrasound, pleural biopsy;  Surgeon: Dallin Agrawal MD;  Location: [facility] OR     INJECT BLOCK MEDIAL BRANCH CERVICAL/THORACIC/LUMBAR       INSERT CHEST TUBE Right 2/16/2022    Procedure: INSERTION, CATHETER, INTERCOSTAL, FOR DRAINAGE;  Surgeon: Dallin Agrawal MD;  Location: [facility] GI     INSERT CHEST TUBE Right 3/9/2022    Procedure: INSERTION, CATHETER, INTERCOSTAL, FOR DRAINAGE;  Surgeon: Sushila Antonio MD;  Location:  GI     ORTHOPEDIC SURGERY      Ganesh. Rotator cuff repair.     PLEUROSCOPY N/A 1/26/2022    Procedure: Pleuroscopy with Pleural Biopsy;  Surgeon: Dallin Agrawal MD;  Location:  OR        Family History:    Family History   Problem Relation Age of Onset     Unknown/Adopted Mother      Uterine Cancer Mother      Unknown/Adopted Father      Unknown/Adopted Maternal Grandmother      Unknown/Adopted Maternal Grandfather      Stomach Cancer Maternal Grandfather      Unknown/Adopted Paternal Grandmother      Unknown/Adopted Paternal Grandfather      Melanoma Maternal Uncle       Social History  Here by self    Review of Systems   Review of Systems   Constitutional: Positive for chills. Negative for fever.    Cardiovascular: Positive for chest pain.   All other systems reviewed and are negative.       Physical Exam   Vitals:   ED Triage Vitals   Enc Vitals Group      BP 04/02/22 1449 (!) 144/91      Pulse 04/02/22 1449 99      Resp 04/02/22 1500 18      Temp 04/02/22 1449 99.5  F (37.5  C)      Temp src 04/02/22 1449 Oral      SpO2 04/02/22 1449 97 %      Weight --       Height --       Head Circumference --       Peak Flow --       Pain Score --       Pain Loc --       Pain Edu? --       Excl. in GC? --         Physical Exam  Eyes:  The pupils are equal and round    Conjunctivae and sclerae are normal  ENT:    The nose is normal    Pinnae are normal  CV:  Regular rate and rhythm     No edema  Resp:  Lungs are clear    Non-labored    No rales    No wheezing   GI:  Abdomen is soft and non-tender, there is no rigidity    No distension    No rebound tenderness   MS:  Normal muscular tone    No asymmetric leg swelling    Chest tube and right chest wall.  Erythema present.  Tender to palpation.  Skin:  No rash or acute skin lesions noted  Neuro:   Awake, alert.      Speech is normal and fluent.    Face is symmetric.     Moves all extremities    Emergency Department Course       Laboratory:  Laboratory findings were communicated with the patient who voiced understanding of the findings.    Labs Ordered and Resulted from Time of ED Arrival to Time of ED Departure   ISTAT GASES LACTATE VENOUS POCT - Abnormal       Result Value    Lactic Acid POCT 1.1      Bicarbonate Venous POCT 26      O2 Sat, Venous POCT 88 (*)     pCO2V Venous POCT 41      pH Venous POCT 7.40      pO2 Venous POCT 55 (*)    BLOOD CULTURE        Interventions:  Medications   sodium chloride (PF) 0.9% PF flush 3 mL (has no administration in time range)   sodium chloride (PF) 0.9% PF flush 3 mL (has no administration in time range)   HYDROmorphone (PF) (DILAUDID) injection 0.5 mg (has no administration in time range)         Emergency Department  Course:  Nursing notes and vitals reviewed.  I performed an exam of the patient as documented above.     Patient will be transported to interventional radiology for removal of his tunneled chest tube.    I spoke with the hospitalist service who will admit the patient.    Impression & Plan      Medical Decision Making:  Connor Emerson is a 41 year old male who presents to the emergency department with tube in the right side of the chest wall.  There is erythema and swelling around the chest tube site.  He was transferred here from [facility].  He had already received vancomycin and Zosyn.  He is not currently due for antibiotics here.  A second blood culture was obtained and repeat lactate was obtained which had normalized.  I spoke with Dr. Browne of the interventional cardiology service who will come into the hospital to remove the catheter.    I spoke with the hospitalist as well who will accept the patient as an admission.  Pain medicine occasions given for pain control.    Diagnosis:  1. Severe sepsis (H)    2. Complication of chest tube, initial encounter         Disposition:   Admission    4/2/2022    EMERGENCY DEPARTMENT       Shiv Howell MD  05/03/17 [id number]        Shiv Howell MD  04/02/22 3000

## 2022-04-02 NOTE — ED PROVIDER NOTES
History   Chief Complaint:  Shortness of Breath       The history is provided by the patient.      Connor Emerson is a 41 year old male with history of diabetes, GERD, hypertension, obesity, type 2 diabetes, and lung cancer who presents with shortness of breath. The patient reports shortness of breath and chest pain starting last night. He states his chest pain is around his chest catheter drain going into his rib cage on the right side. He states that is shortness of breath is due to his chest pain. He also reports abdominal pain and chills and shaking that occurred last night. The patient states he took 5 mg of oxycodone at 0330, another an hour later, and 2 more at 1030 this morning. The patient states he is currently being treated for lung cancer and states his pleurex catheter is scheduled to be removed soon. He denies experiencing any fevers.     Imaging from [facility] on 3/29/22:   CT chest with contrast    1.  Right lower lobe lung mass and scattered bilateral pulmonary nodules have decreased in size.   2.  Mediastinal and right hilar adenopathy has improved.   3.  Small loculated-appearing right pleural effusion is similar in size to the prior study.    Review of Systems   Constitutional: Positive for chills. Negative for fever.   Respiratory: Positive for shortness of breath.    Cardiovascular: Positive for chest pain.   Gastrointestinal: Positive for abdominal pain.   All other systems reviewed and are negative.      Allergies:  Vicodin [Hydrocodone-Acetaminophen]    Medications:  blood glucose (NO BRAND SPECIFIED) lancets standard  famotidine (PEPCID) 40 MG tablet  insulin glargine (LANTUS PEN) 100 UNIT/ML pen  insulin pen needle (31G X 8 MM) 31G X 8 MM miscellaneous  metoclopramide (REGLAN) 5 MG tablet  nystatin (MYCOSTATIN) 860328 UNIT/ML suspension  oxyCODONE (ROXICODONE) 5 MG tablet  prochlorperazine (COMPAZINE) 10 MG tablet  thin (NO BRAND SPECIFIED) lancets  ATENOLOL 50 MG  "TAB   OMEPRAZOLE 20 MG CAP, DELAYED RELEASE       Past Medical History:     Complication of anesthesia   Diabetes    GERD (gastroesophageal reflux disease)   HTN (hypertension)    Insomnia   Mediastinal lymphadenopathy   Migraine with aura  Obesity  Anxiety  Type 2 diabetes mellitus   Adjustment disorder with mixed anxiety and depressed mood  Lateral epicondylitis of right elbow  Cellulitis of hand, right  Pleural effusion on right  Malignant neoplasm of lower lobe of right lung   Brain metastasis     Past Surgical History:    Bronchoscopy   Inject block medial branch cervical/thoracic/lumbar   Insert chest tube x 2  Bilateral rotator cuff repair   Pleuroscopy     Family History:    Mother: uterine cancer   Father: asthma   Sister: asthma   Brother: asthma     Social History:  The patient presents to the emergency department with his wife.   The patient arrived to the emergency department via car.     Physical Exam     Patient Vitals for the past 24 hrs:   BP Temp Pulse Resp SpO2 Height Weight   04/02/22 1330 128/81 -- 93 -- 99 % -- --   04/02/22 1315 127/78 -- 89 -- 99 % -- --   04/02/22 1300 125/74 -- 85 -- 99 % -- --   04/02/22 1245 113/63 -- 90 -- 98 % -- --   04/02/22 1202 104/54 -- 79 -- 94 % -- --   04/02/22 1159 -- -- 81 -- (!) 85 % -- --   04/02/22 1156 -- -- 81 -- (!) 84 % -- --   04/02/22 1130 101/57 -- 88 -- 93 % -- --   04/02/22 1110 136/87 -- 117 -- 95 % -- --   04/02/22 1105 (!) 139/91 -- -- -- -- -- --   04/02/22 1104 -- 99.8  F (37.7  C) 116 16 97 % 1.753 m (5' 9\") 93 kg (205 lb)       Physical Exam  Nursing note and vitals reviewed.  HENT:   Mouth/Throat: Moist mucous membranes.   Eyes: EOMI, nonicteric sclera  Cardiovascular: tachycardic, regular rhythm, no murmurs, rubs, or gallops  Pulmonary/Chest: Effort normal and breath sounds normal. No respiratory distress. No wheezes. No rales.  Pleurx catheter site appears without infection at the skin insertion site, however posteriorly where it is " tunneled, there is induration and erythema suggestive of infection/possible abscess.  Manipulation of the catheter at this location is very painful.  Abdominal: Soft. Nontender, nondistended, no guarding or rigidity.   Musculoskeletal: Normal range of motion.   Neurological: Alert. Moves all extremities spontaneously.   Skin: Skin is warm and dry. No rash noted.       Emergency Department Course     Imaging:  CT Chest w Contrast   Final Result   IMPRESSION:    1.  Pleural catheter remains in the pleural space, however, fluid and stranding does appear to track outside the pleural space into the chest wall along the pleural catheter. This may indicate spread of infection.           Imaging independently reviewed and agree with radiologist interpretation.     Laboratory:  Labs Ordered and Resulted from Time of ED Arrival to Time of ED Departure   COMPREHENSIVE METABOLIC PANEL - Abnormal       Result Value    Sodium 133      Potassium 3.7      Chloride 103      Carbon Dioxide (CO2) 25      Anion Gap 5      Urea Nitrogen 9      Creatinine 0.54 (*)     Calcium 8.8      Glucose 357 (*)     Alkaline Phosphatase 127      AST 12      ALT 20      Protein Total 6.5 (*)     Albumin 3.6      Bilirubin Total 1.2      GFR Estimate >90     LACTIC ACID WHOLE BLOOD - Abnormal    Lactic Acid 2.9 (*)    CBC WITH PLATELETS AND DIFFERENTIAL - Abnormal    WBC Count 22.7 (*)     RBC Count 4.60      Hemoglobin 13.9      Hematocrit 42.3      MCV 92      MCH 30.2      MCHC 32.9      RDW 14.6      Platelet Count 229      % Neutrophils 86      % Lymphocytes 9      % Monocytes 5      % Eosinophils 0      % Basophils 0      % Immature Granulocytes 0      NRBCs per 100 WBC 0      Absolute Neutrophils 19.4 (*)     Absolute Lymphocytes 2.1      Absolute Monocytes 1.0      Absolute Eosinophils 0.0      Absolute Basophils 0.1      Absolute Immature Granulocytes 0.1      Absolute NRBCs 0.0     INR - Normal    INR 1.03     COVID-19 VIRUS (CORONAVIRUS) BY  [facility]        Emergency Department Course:             Reviewed:  I reviewed nursing notes, vitals, past medical history and Care Everywhere    Assessments:  1112 I obtained history and examined the patient as noted above.   1312 I rechecked the patient and explained findings.     Consults:  1301 I spoke with Dr. Adamson of interventional radiology, regarding the patient.   1316 I spoke with Dr. Kole Polanco from [facility] emergency department, regarding the patient.     Interventions:  1131: ondansetron (ZOFRAN) injection 4 mg, IV   1132: HYDROmorphone (PF) (DILAUDID) injection 0.5 mg, IV   1251: lactated ringers BOLUS 1,000 mL, IV   1257: piperacillin-tazobactam (ZOSYN) intermittent infusion 4.5 g, IV   1338: vancomycin (VANCOCIN) 2,000 mg in sodium chloride 0.9 % 500 mL intermittent infusion, IV     Disposition:  The patient was transferred to [facility] emergency department via ambulance. Dr. Polanco accepted the patient for transfer.     Impression & Plan     CMS Diagnoses:   The patient has signs of Severe Sepsis     The patient has signs of Severe Sepsis as evidenced by:    1. 2 SIRS criteria, AND  2. Suspected infection, AND   3. Organ dysfunction: Lactic Acidosis with value >2.0    Time severe sepsis diagnosis confirmed: 1143  04/02/22 as this was the time when Lactate resulted, and the level was > 2.0    3 Hour Severe Sepsis Bundle Completion:    1. Initial Lactic Acid Result:   Recent Labs   Lab Test 04/02/22  1131   LACT 2.9*     2. Blood Cultures before Antibiotics: 1/2 obtained.  3. Broad Spectrum Antibiotics Administered:  yes       Anti-infectives (From admission through now)    Start     Dose/Rate Route Frequency Ordered Stop    04/02/22 1325  vancomycin (VANCOCIN) 2,000 mg in sodium chloride 0.9 % 500 mL intermittent infusion         2,000 mg  over 2 Hours Intravenous ONCE 04/02/22 1322 04/02/22 1409    04/02/22 1245  piperacillin-tazobactam (ZOSYN) intermittent infusion 4.5 g        Note to Pharmacy: For  "[facility], [facility] and [facility]: For Zosyn-naive patients, use the \"Zosyn initial dose + extended infusion\" order panel.    4.5 g  200 mL/hr over 30 Minutes Intravenous ONCE 04/02/22 1243 04/02/22 1329          4. Is initial hypotension present?     No (IV fluid bolus NOT required). IV Fluid volume administered: 1L                Severe Sepsis reassessment:  1. Repeat Lactic Acid Level within 6 hours of time zero: Will be obtained @ [facility]  2. MAP>65 after initial IVF bolus, will continue to monitor fluid status and vital signs      Medical Decision Making:  Patient presents with right chest wall pain at the site of his Pleurx catheter.  On exam this appears clearly infected with induration and possible abscess.  CT did not fully capture this, but does suggest infection.  Significant leukocytosis with associated lactic acidosis suggests severe sepsis.  Patient received broad-spectrum antibiotics with vancomycin and Zosyn.  I contacted interventional radiology on-call, Dr. Aceves, who plans on removing the catheter later today.  He requested we transfer to [facility] where he has the staff and the equipment to perform this procedure.  I contacted Dr. Kole Polanco in the emergency department there who accepted patient for an ED to ED transfer where upon arrival Dr. Browne states he will arrange and perform the procedure.  Patient and wife are in agreement with this plan.  All questions answered.    Critical Care Time: was 40 minutes for this patient excluding procedures    Diagnosis:    ICD-10-CM    1. Severe sepsis (H)  A41.9     R65.20    2. Chest wall abscess  L02.213        Scribe Disclosure:  Joselin MUNGUIA, am serving as a scribe at 11:12 AM on 4/2/2022 to document services personally performed by Terrance Das MD based on my observations and the provider's statements to me.          Terrance Das MD  04/02/22 1432    "

## 2022-04-02 NOTE — ED NOTES
Bed: ED01  Expected date:   Expected time:   Means of arrival:   Comments:  Patient coming back form IR

## 2022-04-03 LAB
ALBUMIN SERPL-MCNC: 2.6 G/DL (ref 3.4–5)
ALP SERPL-CCNC: 84 U/L (ref 40–150)
ALT SERPL W P-5'-P-CCNC: 12 U/L (ref 0–70)
ANION GAP SERPL CALCULATED.3IONS-SCNC: 3 MMOL/L (ref 3–14)
AST SERPL W P-5'-P-CCNC: 7 U/L (ref 0–45)
BASOPHILS # BLD AUTO: 0 10E3/UL (ref 0–0.2)
BASOPHILS NFR BLD AUTO: 0 %
BILIRUB SERPL-MCNC: 0.9 MG/DL (ref 0.2–1.3)
BUN SERPL-MCNC: 10 MG/DL (ref 7–30)
CALCIUM SERPL-MCNC: 8.4 MG/DL (ref 8.5–10.1)
CHLORIDE BLD-SCNC: 106 MMOL/L (ref 94–109)
CO2 SERPL-SCNC: 28 MMOL/L (ref 20–32)
CREAT SERPL-MCNC: 0.54 MG/DL (ref 0.66–1.25)
EOSINOPHIL # BLD AUTO: 0.1 10E3/UL (ref 0–0.7)
EOSINOPHIL NFR BLD AUTO: 1 %
ERYTHROCYTE [DISTWIDTH] IN BLOOD BY AUTOMATED COUNT: 15.2 % (ref 10–15)
GFR SERPL CREATININE-BSD FRML MDRD: >90 ML/MIN/1.73M2
GLUCOSE BLD-MCNC: 305 MG/DL (ref 70–99)
GLUCOSE BLDC GLUCOMTR-MCNC: 198 MG/DL (ref 70–99)
GLUCOSE BLDC GLUCOMTR-MCNC: 224 MG/DL (ref 70–99)
GLUCOSE BLDC GLUCOMTR-MCNC: 247 MG/DL (ref 70–99)
GLUCOSE BLDC GLUCOMTR-MCNC: 272 MG/DL (ref 70–99)
GLUCOSE BLDC GLUCOMTR-MCNC: 282 MG/DL (ref 70–99)
HCT VFR BLD AUTO: 35.4 % (ref 40–53)
HGB BLD-MCNC: 11.8 G/DL (ref 13.3–17.7)
IMM GRANULOCYTES # BLD: 0.1 10E3/UL
IMM GRANULOCYTES NFR BLD: 1 %
LYMPHOCYTES # BLD AUTO: 1.2 10E3/UL (ref 0.8–5.3)
LYMPHOCYTES NFR BLD AUTO: 11 %
MCH RBC QN AUTO: 30.5 PG (ref 26.5–33)
MCHC RBC AUTO-ENTMCNC: 33.3 G/DL (ref 31.5–36.5)
MCV RBC AUTO: 92 FL (ref 78–100)
MONOCYTES # BLD AUTO: 0.7 10E3/UL (ref 0–1.3)
MONOCYTES NFR BLD AUTO: 6 %
NEUTROPHILS # BLD AUTO: 9.7 10E3/UL (ref 1.6–8.3)
NEUTROPHILS NFR BLD AUTO: 81 %
NRBC # BLD AUTO: 0 10E3/UL
NRBC BLD AUTO-RTO: 0 /100
PLATELET # BLD AUTO: 168 10E3/UL (ref 150–450)
POTASSIUM BLD-SCNC: 3.5 MMOL/L (ref 3.4–5.3)
PROT SERPL-MCNC: 5.5 G/DL (ref 6.8–8.8)
RBC # BLD AUTO: 3.87 10E6/UL (ref 4.4–5.9)
SODIUM SERPL-SCNC: 137 MMOL/L (ref 133–144)
WBC # BLD AUTO: 11.8 10E3/UL (ref 4–11)

## 2022-04-03 PROCEDURE — 258N000003 HC RX IP 258 OP 636

## 2022-04-03 PROCEDURE — 99232 SBSQ HOSP IP/OBS MODERATE 35: CPT | Performed by: INTERNAL MEDICINE

## 2022-04-03 PROCEDURE — 250N000013 HC RX MED GY IP 250 OP 250 PS 637: Performed by: HOSPITALIST

## 2022-04-03 PROCEDURE — 85025 COMPLETE CBC W/AUTO DIFF WBC: CPT | Performed by: HOSPITALIST

## 2022-04-03 PROCEDURE — 250N000012 HC RX MED GY IP 250 OP 636 PS 637: Performed by: HOSPITALIST

## 2022-04-03 PROCEDURE — 80053 COMPREHEN METABOLIC PANEL: CPT | Performed by: INTERNAL MEDICINE

## 2022-04-03 PROCEDURE — 36415 COLL VENOUS BLD VENIPUNCTURE: CPT | Performed by: INTERNAL MEDICINE

## 2022-04-03 PROCEDURE — 250N000011 HC RX IP 250 OP 636: Performed by: HOSPITALIST

## 2022-04-03 PROCEDURE — 250N000011 HC RX IP 250 OP 636

## 2022-04-03 PROCEDURE — 120N000001 HC R&B MED SURG/OB

## 2022-04-03 RX ADMIN — PIPERACILLIN SODIUM AND TAZOBACTAM SODIUM 3.38 G: 3; .375 INJECTION, POWDER, LYOPHILIZED, FOR SOLUTION INTRAVENOUS at 21:50

## 2022-04-03 RX ADMIN — VANCOMYCIN HYDROCHLORIDE 1500 MG: 5 INJECTION, POWDER, LYOPHILIZED, FOR SOLUTION INTRAVENOUS at 00:37

## 2022-04-03 RX ADMIN — PIPERACILLIN SODIUM AND TAZOBACTAM SODIUM 3.38 G: 3; .375 INJECTION, POWDER, LYOPHILIZED, FOR SOLUTION INTRAVENOUS at 08:06

## 2022-04-03 RX ADMIN — INSULIN ASPART 3 UNITS: 100 INJECTION, SOLUTION INTRAVENOUS; SUBCUTANEOUS at 09:33

## 2022-04-03 RX ADMIN — PIPERACILLIN SODIUM AND TAZOBACTAM SODIUM 3.38 G: 3; .375 INJECTION, POWDER, LYOPHILIZED, FOR SOLUTION INTRAVENOUS at 02:30

## 2022-04-03 RX ADMIN — FAMOTIDINE 40 MG: 20 TABLET ORAL at 08:06

## 2022-04-03 RX ADMIN — ACETAMINOPHEN 650 MG: 325 TABLET ORAL at 21:49

## 2022-04-03 RX ADMIN — VANCOMYCIN HYDROCHLORIDE 1500 MG: 5 INJECTION, POWDER, LYOPHILIZED, FOR SOLUTION INTRAVENOUS at 13:42

## 2022-04-03 RX ADMIN — INSULIN GLARGINE 10 UNITS: 100 INJECTION, SOLUTION SUBCUTANEOUS at 03:03

## 2022-04-03 RX ADMIN — PIPERACILLIN SODIUM AND TAZOBACTAM SODIUM 3.38 G: 3; .375 INJECTION, POWDER, LYOPHILIZED, FOR SOLUTION INTRAVENOUS at 16:40

## 2022-04-03 RX ADMIN — ACETAMINOPHEN 650 MG: 325 TABLET ORAL at 08:06

## 2022-04-03 ASSESSMENT — ACTIVITIES OF DAILY LIVING (ADL)
ADLS_ACUITY_SCORE: 7

## 2022-04-03 NOTE — PLAN OF CARE
Goal Outcome Evaluation:      7518-2109:    A/O x4. VSS on 1 L NC, cont pulse ox.  Up ind in room. On Consistent carb diet, tolerating well. Q4h VS. Denied N/V/SOB. C/o dull pain 2/10 at removed drain Right lower chest site, no med given. Right lower chest site dressing DCI.  L PIV SL with int abx.   @ 0200. Continue to monitor care. Discharge pending improvement.

## 2022-04-03 NOTE — PHARMACY-VANCOMYCIN DOSING SERVICE
"Pharmacy Vancomycin Initial Note  Date of Service 2022  Patient's  1980  41 year old, male    Indication: pleurex catheter infection    Current estimated CrCl = Estimated Creatinine Clearance: 199.6 mL/min (A) (based on SCr of 0.54 mg/dL (L)).    Creatinine for last 3 days  2022: 11:14 AM Creatinine 0.54 mg/dL    Recent Vancomycin Level(s) for last 3 days  No results found for requested labs within last 72 hours.      Vancomycin IV Administrations (past 72 hours)                   vancomycin (VANCOCIN) 2,000 mg in sodium chloride 0.9 % 500 mL intermittent infusion (mg) 2,000 mg New Bag 22 1338                Nephrotoxins and other renal medications (From now, onward)    Start     Dose/Rate Route Frequency Ordered Stop    22 2100  piperacillin-tazobactam (ZOSYN) 3.375 g vial to attach to  mL bag        Note to Pharmacy: For [facility], [facility] and [facility]: For Zosyn-naive patients, use the \"Zosyn initial dose + extended infusion\" order panel.    3.375 g  over 30 Minutes Intravenous EVERY 6 HOURS 22            Contrast Orders - past 72 hours (72h ago, onward)            None          [employer] Prediction of Planned Initial Vancomycin Regimen  Loading dose: 2000 mg at 13:38 2022.  Regimen: 1500 mg IV every 12 hours.  Start time: 01:00 on 2022  Exposure target: AUC24 (range)400-600 mg/L.hr   AUC24,ss: 553 mg/L.hr  Probability of AUC24 > 400: 80 %  Ctrough,ss: 16.6 mg/L  Probability of Ctrough,ss > 20: 36 %  Probability of nephrotoxicity (Lodise CURTIS ): 12 %        Plan:  1. Received vancomycin 2000 mg IV x1, continue vancomycin 1500 mg IV q12h.   2. Vancomycin monitoring method: AUC  3. Vancomycin therapeutic monitoring goal: 400-600 mg*h/L  4. Pharmacy will check vancomycin levels as appropriate in 1-3 Days.    5. Serum creatinine levels will be ordered daily for the first week of therapy and at least twice weekly for subsequent weeks.      [place], [facility]   "

## 2022-04-03 NOTE — CONSULTS
"CLINICAL NUTRITION SERVICES  -  ASSESSMENT NOTE      Recommendations Ordered by Registered Dietitian (RD):   10am vanilla Glucerna  2pm vanilla Ensure Max protein    Malnutrition:   % Weight Loss:  > 7.5% in 3 months (severe malnutrition)  % Intake:  <75% for > 7 days (moderate malnutrition)  Subcutaneous Fat Loss:  None observed  Muscle Loss:  None observed - suspect masked by adiposity   Fluid Retention:  None noted    Malnutrition Diagnosis: Moderate malnutrition  In Context of:  Acute on Chronic illness or disease        REASON FOR ASSESSMENT  Connor Emerson is a 41 year old male seen by Registered Dietitian for Provider Order - Nutrition Education - uncontrolled diabetes    NUTRITION HISTORY  Chart reviewed and discussed with patient/spouse  Report adequate understanding of diabetes diet and decline diet teaching  Over the past few months while on steroid, reports \"huge\" appetite and PO intake, yet was still losing weight   In the past 1 week, he has been eating less than normal d/t a decreasing appetite from chemo pills  Just prior to admission states that he was unable to finish even a bowel of spaghetti   Usually takes 2-3 Pure Protein drinks/day   No known food allergies     CURRENT NUTRITION ORDERS  Diet Order:     Moderate consistent CHO     Current Intake/Tolerance:  Fair appetite, had salad for lunch   Interested in supplements here   No questions on current diet     NUTRITION FOCUSED PHYSICAL ASSESSMENT FOR DIAGNOSING MALNUTRITION)  Yes         Observed:    No nutrition-related physical findings observed - suspected losses masked by adiposity     Obtained from Chart/Interdisciplinary Team:  None noted     ANTHROPOMETRICS  Height: 5' 9\"   Weight: 93 kg (205 lb) - 4/2  BMI: 30.27 kg/m^2  Weight Status:  Obesity Grade I BMI 30-34.9  IBW: 73 kg   % IBW: 127%  Weight History: Down 22 lbs in the past 3 months (10%)  Wt Readings from Last 20 Encounters:   04/02/22 93 kg (205 lb)   03/21/22 93.9 kg (207 lb) "   03/09/22 90.8 kg (200 lb 2.8 oz)   02/24/22 93.9 kg (207 lb)   02/16/22 95.9 kg (211 lb 6.7 oz)   02/16/22 95.9 kg (211 lb 6.7 oz)   02/14/22 96.6 kg (213 lb)   02/02/22 100.7 kg (222 lb)   01/26/22 103.2 kg (227 lb 8.2 oz)   01/25/22 103.6 kg (228 lb 4.8 oz)   01/22/22 101.7 kg (224 lb 4.8 oz)   05/12/20 108.9 kg (240 lb)   12/11/18 111.6 kg (246 lb)   08/10/18 111.9 kg (246 lb 11.2 oz)   02/09/18 117.3 kg (258 lb 9.6 oz)   02/02/18 117.7 kg (259 lb 6.4 oz)   11/27/17 118.4 kg (261 lb)   07/06/17 116.1 kg (256 lb)   06/29/17 116.6 kg (257 lb)   05/11/17 116.8 kg (257 lb 9.6 oz)       LABS  Labs reviewed  Recent Labs   Lab 04/03/22  1354 04/03/22  0747 04/03/22  0744 04/03/22  0201 04/02/22  2205 04/02/22  1909   * 305* 272* 282* 337* 315*     Lab Results   Component Value Date    A1C 10.1 01/19/2022    A1C 11.9 05/12/2020    A1C 6.3 08/10/2018    A1C 6.5 11/27/2017    A1C 6.4 06/29/2017    A1C 6.5 02/13/2017       MEDICATIONS  Medications reviewed      ASSESSED NUTRITION NEEDS PER APPROVED PRACTICE GUIDELINES:    Dosing Weight 78 kg - adjusted  Estimated Energy Needs: 9044-5109 kcals (25-30 Kcal/Kg)  Justification: obese, repletion   Estimated Protein Needs:  grams protein (1.2-1.5 g pro/Kg)  Justification: preservation of lean body mass  Estimated Fluid Needs: 1 mL/kcal      MALNUTRITION:  % Weight Loss:  > 7.5% in 3 months (severe malnutrition)  % Intake:  <75% for > 7 days (moderate malnutrition)  Subcutaneous Fat Loss:  None observed  Muscle Loss:  None observed - suspect masked by adiposity   Fluid Retention:  None noted    Malnutrition Diagnosis: Moderate malnutrition  In Context of:  Acute on Chronic illness or disease    NUTRITION DIAGNOSIS:  Inadequate oral intake related to decreased appetite as evidenced by intakes <75% for the past 1 week       NUTRITION INTERVENTIONS  Recommendations / Nutrition Prescription  Mod CHO diet   10am vanilla Glucerna  2pm vanilla Ensure Max protein      Implementation  Nutrition education: Patient declined  Medical Food Supplement: as above      Nutrition Goals  Patient will consume 75% meals and supplements   BGM <180      MONITORING AND EVALUATION:  Progress towards goals will be monitored and evaluated per protocol and Practice Guidelines      Diana Rojas RD, LD  Clinical Dietitian   Weekend pager [phone]

## 2022-04-03 NOTE — PLAN OF CARE
Goal Outcome Evaluation:    Shift Note: 04/02/2022 8796-3413  Arrived to unit at 1900 s/p drain removal. A/O x4, VSS on 2L O2. Up independently in room. Tolerating wean off O2. Consistent carb diet, corrective insulin as ordered. Reported pain 8/10 in L flank, oxycodone 5mg given with pain improvement of 2/10. Denies nausea. ID consulted- gram stain showed gram + cocci and WBC. MD alerted, no change to tx plan. Discharge TBD.

## 2022-04-03 NOTE — PROGRESS NOTES
"[facility]    Hospitalist Progress Note    Assessment & Plan   Connor Emerson is a 41 year old male with PMHx of metastatic lung cancer with mets to the brain and R sided loculated pleural effusion for which he has a PleurX catheter, hypertension, DM II, GERD and anxiety who was admitted on 4/2/2022 for sepsis dt infected PleurX catheter.      Severe sepsis dt infected R pleurex catheter, s/p removal per IR on 4/2/22   Loculated malignant R pleural effusion  * Underwent placement of PleurX catheter per interventional pulm at [facility] on 3/9/22 for mgmt of loculated malignant effusion. Follows with pulm at the . Seen in virtual visit on 3/29 with Dr. Agrawal and felt to be doing well. CTA chest 3/29 showed minimal residual effusion and plan was to remove the PLeurX in the coming weeks.   * Present[facility] ED with increased pain that had started the day prior (started 4/1).   * Afebrile, initially tachycardic but BPs stable. WBC 22.7, lactate 2.9.   * CT chest obtained and showed \"Pleural catheter remains in the pleural space, however, fluid and stranding does appear to track outside the pleural space into the chest wall along the pleural catheter. This may indicate spread of infection.\"  * Started on broad spectrum abx with Vanco and Zosyn and transferred to [facility] for IR evaluation.   * IR removed PleurX catheter on 4/2 PM. Tip sent for culture  No complications. I&D of raised area of erythema and pus drained per Dr. Adamson  -- remains afebrile, WBC improved; await culture data  -- ID following, cont Vanco and Zosyn for now  -- thoracic surgery following for possible debridement -- no plans for surgical intervention at his time but will cont following  -- presently on 1L NC -- wean as able, cont pulmonary toilet with IS/OOB    Metastatic lung cancer with mets to the brain  * Malignancy diagnosed in 1/2022. Follows with Dr. Persaud of [facility] oncology.   * S/p radiation for brain mets in " 2/2022,  * Currrently on oral chemo with alectinib 450mg po BID though pt held after 4/1 AM dose per his oncologist recs given development of pain as above.   -- holding oral chemo  -- OP follow up with oncology after discharge (has appt with oCri on 4/6, may need to reschedule)     DM II, uncontrolled  * A1c 10.1. Manages with Lantus 10U daily.   * Started Lantus 15U daily and mid dose sliding scale insulin on admission  -- BG significantly elevated today, into 300s -- will increase Lantus to 20U BID, change sliding scale insulin to high dose protocol    Recent Labs   Lab 04/03/22  0747 04/03/22  0744 04/03/22  0201 04/02/22  2205 04/02/22  1909 04/02/22  1114   * 272* 282* 337* 315* 357*       Hypertension  * Not on BP meds at baseline. BPs stable this stay    GERD  Chronic and stable on H2 blocker     Anxiety  * Not on meds at baseline     FEN: no IVFs, lytes stable, mod CHO diet  DVT Prophylaxis: PCDs  Code Status: Full Code    Disposition: Discharge pending response to treatment, plans for antibiotics -- suspect 3+ days still.     Frieda Poalnco    Interval History   Seen this morning. Tired but alert and conversing appropriately No specific complaints. Some mild tenderness on R side but no significant pain. Denies sob/cough. Presently on 1L NC. Appetite not great but no abd pain/n/v.     -Data reviewed today: I reviewed all new labs and imaging results over the last 24 hours. I personally reviewed no images or EKG's today.    Physical Exam   Temp: 98.4  F (36.9  C) Temp src: Oral BP: 113/68 Pulse: 84   Resp: 16 SpO2: 96 % O2 Device: Nasal cannula Oxygen Delivery: 1 LPM  There were no vitals filed for this visit.  Vital Signs with Ranges  Temp:  [98.4  F (36.9  C)-99.8  F (37.7  C)] 98.4  F (36.9  C)  Pulse:  [] 84  Resp:  [16-29] 16  BP: (101-154)/() 113/68  SpO2:  [84 %-99 %] 96 %  No intake/output data recorded.    Constitutional: Resting comfortably, alert and conversing  appropriately, NAD  Respiratory: BS diminished towards bases but otherwise CTA, no wheeze, no increased work of breathing  Cardiovascular: HRRR, no MGR, no LE edema  GI: S, NT, ND, +BS  Skin/Integumen: warm, moist  Other: +R sided chest wall tenderness/swelling    Medications       famotidine  40 mg Oral Daily     insulin aspart  1-7 Units Subcutaneous TID AC     insulin aspart  1-5 Units Subcutaneous At Bedtime     insulin glargine  15 Units Subcutaneous QAM AC     piperacillin-tazobactam  3.375 g Intravenous Q6H     sodium chloride (PF)  3 mL Intracatheter Q8H     sodium chloride (PF)  3 mL Intracatheter Q8H     vancomycin  1,500 mg Intravenous Q12H       Data   Recent Labs   Lab 04/03/22  0747 04/03/22  0744 04/03/22  0201 04/02/22  1909 04/02/22  1115 04/02/22  1114   WBC 11.8*  --   --   --  22.7*  --    HGB 11.8*  --   --   --  13.9  --    MCV 92  --   --   --  92  --      --   --   --  229  --    INR  --   --   --   --   --  1.03     --   --   --   --  133   POTASSIUM 3.5  --   --   --   --  3.7   CHLORIDE 106  --   --   --   --  103   CO2 28  --   --   --   --  25   BUN 10  --   --   --   --  9   CR 0.54*  --   --   --   --  0.54*   ANIONGAP 3  --   --   --   --  5   TORY 8.4*  --   --   --   --  8.8   * 272* 282*   < >  --  357*   ALBUMIN 2.6*  --   --   --   --  3.6   PROTTOTAL 5.5*  --   --   --   --  6.5*   BILITOTAL 0.9  --   --   --   --  1.2   ALKPHOS 84  --   --   --   --  127   ALT 12  --   --   --   --  20   AST 7  --   --   --   --  12    < > = values in this interval not displayed.       Recent Results (from the past 24 hour(s))   CT Chest w Contrast    Narrative    EXAM: CT CHEST WITH CONTRAST  LOCATION: [facility]  DATE/TIME: 04/02/2022, 12:14 PM    INDICATION: Right-sided Pleurx catheter. Appears to have erythema infection posterior to insertion site. Evaluate abscess and catheter location.  COMPARISON: None.  TECHNIQUE: CT chest with IV contrast.  Multiplanar reformats were obtained. Dose reduction techniques were used.    CONTRAST: 100 mL Isovue 370.    FINDINGS:   LUNGS AND PLEURA: Loculated effusion on the right with Pleurx catheter in place. Some associated compressive atelectasis and/or infiltrate. Fluid does appear to track along the catheter into the chest wall.    MEDIASTINUM/AXILLAE: A few mildly prominent lymph nodes are noted which are nonspecific and may be reactive in this setting. No aneurysm.    CORONARY ARTERY CALCIFICATION: None.    UPPER ABDOMEN: No acute findings.    MUSCULOSKELETAL: No frankly destructive bony lesions.      Impression    IMPRESSION:   1.  Pleural catheter remains in the pleural space, however, fluid and stranding does appear to track outside the pleural space into the chest wall along the pleural catheter. This may indicate spread of infection.     IR Chest Tube Removal Tunneled Right    Narrative    41-year-old patient with a right-sided tunneled Pleurx catheter,  thought to be placed February 16, 2022. Patient presents with  increasing pain and inflammatory change on CT exam, plan is for  removal due to presumed infection.    TECHNIQUE: Patient was brought to the interventional radiology  department and informed consent obtained. Patient was placed in a left  lateral decubitus position. The catheter and surrounding skin were  prepped and draped in standard sterile fashion. 1% lidocaine was used  for local anesthesia. With sharp and blunt dissection, the  subcutaneous cuff was removed from the surrounding soft tissue. The  tube was then removed and the tip was placed in a sterile container  and sent to the lab for evaluation. There was an additional area  posterior to initial access point that appeared to be a boil at the  skin surface. 1% lidocaine was used and an incision and drainage was  performed as pus was expelled from the tract. Vaseline gauze was  applied to the skin entry site and additional dressing applied to  the  secondary bone drainage site. Patient tolerated the procedure well.    Sedation: A moderate level of sedation was achieved with 1.5 mg IV  Versed and 75 mcg IV fentanyl.  Sedation time: 17 minutes  Please note the above medications administered by the interventional  radiology staff under my direct supervision.    Vital signs were monitored and remained stable throughout the  procedure.  Fluoroscopic time: None  Contrast: None  Local anesthetic: 25 mL of 1% lidocaine.  Imaging: None.      Impression    IMPRESSION:   1. Successful removal right tunneled pleural Pleurx catheter. Tip of  the catheter sent to the lab for evaluation.  2. Incision and drainage of a site along the tract, a suspected  infected boil, as pus was discharged after removal of the tube.    LV LÓPEZ MD         SYSTEM ID:  [id number]

## 2022-04-03 NOTE — CONSULTS
[facility]    Infectious Disease Consultation     Date of Admission:  4/2/2022  Date of Consult : 04/03/22      Assessment:  1. Right sided chest wall infection at pleurx cathter insertion site with concern for chest wall abscess. Cathter tip with GPC, S.aurues most likely. Has swelling and significant tenderness concerning for deeper infection in the setting of poorly controlled diabetes  2. Loculated right malignant pleural effusion which was being drained by the pleurx cathter , unclear whether effusion is infected  3. Metastatic non small cell lung cancer (s/p radiation/ on PO chemotherapy) with malignant right pleural effusion and brain metastases, s/p gamma knife treatment on 2/15  4. Uncontrolled diabetes with HbA1c of 10.1%    Recommendations:  1. Thoracic surgical evaluation for surgical debridement of right chest abscess in the setting of immunocompromise  2. Catheter has been removed, follow tip culture  3. Follow blood cx  4. Continue Vancomycin/ zosyn for now, antibiotic de escalation once cx data are available  5. Pleural fluid is likely infected given tracking into the chest wall .    Cristina Mathis MD    Reason for Consult   I was asked to evaluate this patient for antimicrobial recommendations for infected pleurx cathter in the setting of malignant pleural effusion..    Primary Care Physician   Radha Shetty    Chief Complaint   Chest wall pain    History is obtained from the patient and medical records    History of Present Illness   Connor Emerson is a 41 year old male with uncontrolled diabetes (HbA1c of 10.1%)  and metastatic non small cell lung cancer (s/p radiation/ on PO chemotherapy) with malignant right pleural effusion and brain metastases, s/p gamma knife treatment on 2/15. He had a right pleurx cathter placed for malignant pleural effusion on 3/9 who has been hospitalized with chest wall pain around the catheter site with chills and vomiting as well as purulent  drainage.      He was afebrile on admission but had marked leukocytosis of 22.7K with sepsis syndrome. Blood cxs are negative,  Pleurx cathter was removed with a small local I&D by IR and drainage of purulence,  tip cx is pending but gram stain shows GPC.and ID has been asked to assist with antibiotic management.    Patient continues to have significant pain an tenderness with swelling of the right chest wall.    Antimicrobial therapy  4/1 Vancomycin, Zosyn    Past Medical History   I have reviewed this patient's medical history and updated it with pertinent information if needed.   Past Medical History:   Diagnosis Date     Atypical chest pain 12/02/2013     Complication of anesthesia      Diabetes (H)      GERD (gastroesophageal reflux disease) 12/02/2013     HTN (hypertension) 05/14/2012     HTN, goal below 140/90 07/02/2013     Insomnia 02/21/2012     Mediastinal lymphadenopathy      Migraine headache 07/02/2013     Migraine with aura, without mention of intractable migraine without mention of status migrainosus      Pneumonia        Past Surgical History   I have reviewed this patient's surgical history and updated it with pertinent information if needed.  Past Surgical History:   Procedure Laterality Date     BRONCHOSCOPY RIGID OR FLEXIBLE W/TRANSENDOSCOPIC ENDOBRONCHIAL ULTRASOUND GUIDED Bilateral 1/26/2022    Procedure: Right BRONCHOSCOPY, FIBEROPTIC, endobronchial ultrasound, pleural biopsy;  Surgeon: Dallin Agrawal MD;  Location: [facility] OR     INJECT BLOCK MEDIAL BRANCH CERVICAL/THORACIC/LUMBAR       INSERT CHEST TUBE Right 2/16/2022    Procedure: INSERTION, CATHETER, INTERCOSTAL, FOR DRAINAGE;  Surgeon: Dallin Agrawal MD;  Location: [facility] GI     INSERT CHEST TUBE Right 3/9/2022    Procedure: INSERTION, CATHETER, INTERCOSTAL, FOR DRAINAGE;  Surgeon: Sushila Antonio MD;  Location: [facility] GI     IR CHEST TUBE REMOVAL TUNNELED RIGHT  4/2/2022     ORTHOPEDIC SURGERY      Ganesh. Rotator cuff repair.     PLEUROSCOPY N/A  1/26/2022    Procedure: Pleuroscopy with Pleural Biopsy;  Surgeon: Dallin Agrawal MD;  Location: [facility] OR       Prior to Admission Medications   Prior to Admission Medications   Prescriptions Last Dose Informant Patient Reported? Taking?   alcohol swab prep pads  Self No No   Sig: Use to swab area of injection/jabier as directed.   alectinib (ALECENSA) 150 MG CAPS 4/1/2022 at Unknown time Self Yes Yes   Sig: Take 450 mg by mouth 2 times daily    blood glucose (NO BRAND SPECIFIED) lancets standard  Self No No   Sig: Use to test blood sugar 1 times daily or as directed.   blood glucose (NO BRAND SPECIFIED) test strip  Self No No   Sig: Use to test blood sugar 1 times daily or as directed.   blood glucose (NO BRAND SPECIFIED) test strip  Self No No   Sig: Use to test blood sugar 2 times daily or as directed. To accompany: Blood Glucose Monitor Brands: per insurance.   blood glucose calibration (NO BRAND SPECIFIED) solution  Self No No   Sig: To accompany: Blood Glucose Monitor Brands: per insurance.   blood glucose monitoring (NO BRAND SPECIFIED) meter device kit  Self No No   Sig: Use to test blood sugar 1 times daily or as directed.   blood glucose monitoring (NO BRAND SPECIFIED) meter device kit  Self No No   Sig: Use to test blood sugar 2 times daily or as directed. Preferred blood glucose meter OR supplies to accompany: Blood Glucose Monitor Brands: per insurance.   famotidine (PEPCID) 40 MG tablet prn at prn Self Yes Yes   Sig: Take 40 mg by mouth daily   insulin glargine (LANTUS PEN) 100 UNIT/ML pen 4/2/2022 at AM Self No Yes   Sig: Inject 10 Units Subcutaneous At Bedtime   Patient taking differently: Inject 10 Units Subcutaneous every morning    insulin pen needle (31G X 8 MM) 31G X 8 MM miscellaneous  Self No No   Sig: Use one pen needles daily or as directed.   metoclopramide (REGLAN) 5 MG tablet prn at prn Self No Yes   Sig: Take 1 tablet (5 mg) by mouth 3 times daily as needed (hiccups)   nystatin  (MYCOSTATIN) 694502 UNIT/ML suspension Not Taking at Unknown time Self No No   Sig: Take 2 mLs (200,000 Units) by mouth 4 times daily   Patient not taking: Reported on 4/2/2022   oxyCODONE (ROXICODONE) 5 MG tablet prn at prn Self No Yes   Sig: Take 1 tablet (5 mg) by mouth every 6 hours as needed for pain   prochlorperazine (COMPAZINE) 10 MG tablet prn at prn Self No Yes   Sig: Take 1 tablet (10 mg) by mouth every 6 hours as needed (Nausea/Vomiting)   thin (NO BRAND SPECIFIED) lancets  Self No No   Sig: Use with lanceting device. To accompany: Blood Glucose Monitor Brands: per insurance.      Facility-Administered Medications: None     Allergies   Allergies   Allergen Reactions     Vicodin [Hydrocodone-Acetaminophen] Nausea and Vomiting and GI Disturbance       Immunization History   Immunization History   Administered Date(s) Administered     HepA-Adult 04/10/2014     HepB-Adult 04/10/2014     Influenza (IIV3) PF 10/06/2009, 11/03/2010     Influenza Vaccine IM > 6 months Valent IIV4 (Alfuria,Fluzone) 10/06/2015     Pneumococcal 23 valent 10/06/2015     TDAP Vaccine (Adacel) 12/01/2011     Td (Adult), Adsorbed 05/31/2001, 12/27/2004       Social History   I have reviewed this patient's social history and updated it with pertinent information if needed. Connor Emerson  reports that he has never smoked. He has never used smokeless tobacco. He reports current alcohol use. He reports that he does not use drugs.    Family History   I have reviewed this patient's family history and updated it with pertinent information if needed.   Family History   Problem Relation Age of Onset     Unknown/Adopted Mother      Uterine Cancer Mother      Unknown/Adopted Father      Unknown/Adopted Maternal Grandmother      Unknown/Adopted Maternal Grandfather      Stomach Cancer Maternal Grandfather      Unknown/Adopted Paternal Grandmother      Unknown/Adopted Paternal Grandfather      Melanoma Maternal Uncle        Review of Systems   The  10 point Review of Systems is negative other than noted in the HPI or here.     Physical Exam   Temp: 98.4  F (36.9  C) Temp src: Oral BP: 113/68 Pulse: 84   Resp: 16 SpO2: 96 % O2 Device: Nasal cannula Oxygen Delivery: 1 LPM  Vital Signs with Ranges  Temp:  [98.4  F (36.9  C)-99.8  F (37.7  C)] 98.4  F (36.9  C)  Pulse:  [] 84  Resp:  [16-29] 16  BP: (101-154)/() 113/68  SpO2:  [84 %-99 %] 96 %  0 lbs 0 oz  There is no height or weight on file to calculate BMI.    GENERAL APPEARANCE:  awake  EYES: Eyes grossly normal to inspection  NECK: no adenopathy  RESP: lungs clear  Chest wall : right sided chest wall swelling and significant tenderness   CV: regular rates and rhythm  ABDOMEN: soft, nontender  MS: extremities normal  SKIN: no suspicious lesions or rashes  Psych: affect normal       Data   All laboratory data reviewed  Component      Latest Ref Rng & Units 4/3/2022   Sodium      133 - 144 mmol/L 137   Potassium      3.4 - 5.3 mmol/L 3.5   Chloride      94 - 109 mmol/L 106   Carbon Dioxide      20 - 32 mmol/L 28   Anion Gap      3 - 14 mmol/L 3   Urea Nitrogen      7 - 30 mg/dL 10   Creatinine      0.66 - 1.25 mg/dL 0.54 (L)   Calcium      8.5 - 10.1 mg/dL 8.4 (L)   Glucose      70 - 99 mg/dL 305 (H)   Alkaline Phosphatase      40 - 150 U/L 84   AST      0 - 45 U/L 7   ALT      0 - 70 U/L 12   Protein Total      6.8 - 8.8 g/dL 5.5 (L)   Albumin      3.4 - 5.0 g/dL 2.6 (L)   Bilirubin Total      0.2 - 1.3 mg/dL 0.9     Component      Latest Ref Rng & Units 4/3/2022   WBC      4.0 - 11.0 10e3/uL 11.8 (H)   RBC Count      4.40 - 5.90 10e6/uL 3.87 (L)   Hemoglobin      13.3 - 17.7 g/dL 11.8 (L)   Hematocrit      40.0 - 53.0 % 35.4 (L)   MCV      78 - 100 fL 92   MCH      26.5 - 33.0 pg 30.5   MCHC      31.5 - 36.5 g/dL 33.3   RDW      10.0 - 15.0 % 15.2 (H)   Platelet Count      150 - 450 10e3/uL 168     Microbiology  4/2 blood cx pending  4/2 Right pleural cavity  leural Cavity, Right; Foreign Body           0 Result Notes    Gram Stain Result   Abnormal   2+ Gram positive cocci      4+ WBC seen   Predominantly PMNs           Imaging  4/2 CT chest  EXAM: CT CHEST WITH CONTRAST  LOCATION: [facility]  DATE/TIME: 04/02/2022, 12:14 PM     INDICATION: Right-sided Pleurx catheter. Appears to have erythema infection posterior to insertion site. Evaluate abscess and catheter location.  COMPARISON: None.  TECHNIQUE: CT chest with IV contrast. Multiplanar reformats were obtained. Dose reduction techniques were used.     CONTRAST: 100 mL Isovue 370.     FINDINGS:   LUNGS AND PLEURA: Loculated effusion on the right with Pleurx catheter in place. Some associated compressive atelectasis and/or infiltrate. Fluid does appear to track along the catheter into the chest wall.     MEDIASTINUM/AXILLAE: A few mildly prominent lymph nodes are noted which are nonspecific and may be reactive in this setting. No aneurysm.     CORONARY ARTERY CALCIFICATION: None.     UPPER ABDOMEN: No acute findings.     MUSCULOSKELETAL: No frankly destructive bony lesions.                                                                      IMPRESSION:   1.  Pleural catheter remains in the pleural space, however, fluid and stranding does appear to track outside the pleural space into the chest wall along the pleural catheter. This may indicate spread of infection

## 2022-04-03 NOTE — PROVIDER NOTIFICATION
MD Notification    Notified Person: On Call MD    Notified Person Name: Stefanie Pugh    Notification Date/Time: 04/02/22 10:25 PM    Notification Interaction: [employer] Messaging    Purpose of Notification:  Re [address] Gram stain from 5:35pm showed Gram+ cocci & WBC    Orders Received:  Sounds good.. thanks. He is on the right antibiotic so nothing to do    Comments:

## 2022-04-03 NOTE — PLAN OF CARE
Goal Outcome Evaluation:     Shift Note: 04/03/2022 [id number]  A/O x4. Up independently in room. Tolerating wean off O2 (per pt, sats mid 90s at baseline). Elevated BP, temp 100.1, Tylenol given x1. Consistent carb diet, corrective insulin as ordered. Reports some mild pain with movement. Denies nausea. Thoracic sx following, to continue abx as planned. Discharge TBD.

## 2022-04-03 NOTE — PROGRESS NOTES
THORACIC SURGERY    42 yo man  Malignant right pleural effusion.  PleuRx catheter placed 02/2022.  Only had minimal drainage    Infected pleuRx catheter removed yesterday  Gram + cocci    On examination, some tenderness and induration subcutaneous tissue along the track of catheter.  No fluctuance    On CT chest done yesterday:  some pleural thickening no fluid collection to drain    No intervention at this time.  Antibiotics as per ID,  Will follow      Discussed    ASHLEY MANLEY MD [facility] ONCOLOGY THORACIC SURGERY  CELL:  [phone]  OFFICE: [phone]

## 2022-04-03 NOTE — PLAN OF CARE
Care plan note:      Recent Vitals:  Temp: 98.3  F (36.8  C) Temp src: Oral BP: 124/82 Pulse: 78   Resp: 16 SpO2: 97 % O2 Device: Nasal cannula      Orientation/Neuro: Alert and Oriented x 4  Pain: Pain is controlled with current analgesics.  Medication(s) being used: acetaminophen.   Tele: NA.   IV medications: IV antibiotics   Mobility: up Independently   Skin: Wounds: right lateral chest.   GI: WDL  : WDL     Diet: Tolerating diet:   Well  Orders Placed This Encounter      Moderate Consistent Carb (60 g CHO per Meal) Diet      Safety/Concerns:  None  Aggression Color: Green    Plan: Pt has remained afebrile, stable vitals, dressing on lateral chest CDI.   Blood glucose level improving.    Continue to monitor.      Daniele Zamora RN     Goal Outcome Evaluation: Improving.

## 2022-04-04 ENCOUNTER — HOME INFUSION (PRE-WILLOW HOME INFUSION) (OUTPATIENT)
Dept: PHARMACY | Facility: CLINIC | Age: 42
End: 2022-04-04

## 2022-04-04 LAB
ANION GAP SERPL CALCULATED.3IONS-SCNC: 5 MMOL/L (ref 3–14)
BUN SERPL-MCNC: 10 MG/DL (ref 7–30)
CALCIUM SERPL-MCNC: 8.4 MG/DL (ref 8.5–10.1)
CHLORIDE BLD-SCNC: 106 MMOL/L (ref 94–109)
CO2 SERPL-SCNC: 27 MMOL/L (ref 20–32)
CREAT SERPL-MCNC: 0.54 MG/DL (ref 0.66–1.25)
ERYTHROCYTE [DISTWIDTH] IN BLOOD BY AUTOMATED COUNT: 14.6 % (ref 10–15)
GFR SERPL CREATININE-BSD FRML MDRD: >90 ML/MIN/1.73M2
GLUCOSE BLD-MCNC: 299 MG/DL (ref 70–99)
GLUCOSE BLDC GLUCOMTR-MCNC: 127 MG/DL (ref 70–99)
GLUCOSE BLDC GLUCOMTR-MCNC: 148 MG/DL (ref 70–99)
GLUCOSE BLDC GLUCOMTR-MCNC: 152 MG/DL (ref 70–99)
GLUCOSE BLDC GLUCOMTR-MCNC: 196 MG/DL (ref 70–99)
GLUCOSE BLDC GLUCOMTR-MCNC: 265 MG/DL (ref 70–99)
HCT VFR BLD AUTO: 37.7 % (ref 40–53)
HGB BLD-MCNC: 12.2 G/DL (ref 13.3–17.7)
MCH RBC QN AUTO: 30.3 PG (ref 26.5–33)
MCHC RBC AUTO-ENTMCNC: 32.4 G/DL (ref 31.5–36.5)
MCV RBC AUTO: 94 FL (ref 78–100)
PLATELET # BLD AUTO: 171 10E3/UL (ref 150–450)
POTASSIUM BLD-SCNC: 3.5 MMOL/L (ref 3.4–5.3)
RBC # BLD AUTO: 4.02 10E6/UL (ref 4.4–5.9)
SODIUM SERPL-SCNC: 138 MMOL/L (ref 133–144)
VANCOMYCIN SERPL-MCNC: 7.4 MG/L
WBC # BLD AUTO: 9 10E3/UL (ref 4–11)

## 2022-04-04 PROCEDURE — 120N000001 HC R&B MED SURG/OB

## 2022-04-04 PROCEDURE — 250N000013 HC RX MED GY IP 250 OP 250 PS 637: Performed by: HOSPITALIST

## 2022-04-04 PROCEDURE — 80048 BASIC METABOLIC PNL TOTAL CA: CPT

## 2022-04-04 PROCEDURE — 99232 SBSQ HOSP IP/OBS MODERATE 35: CPT | Performed by: HOSPITALIST

## 2022-04-04 PROCEDURE — 258N000003 HC RX IP 258 OP 636

## 2022-04-04 PROCEDURE — 80202 ASSAY OF VANCOMYCIN: CPT

## 2022-04-04 PROCEDURE — 85027 COMPLETE CBC AUTOMATED: CPT

## 2022-04-04 PROCEDURE — 250N000011 HC RX IP 250 OP 636: Performed by: HOSPITALIST

## 2022-04-04 PROCEDURE — 250N000011 HC RX IP 250 OP 636

## 2022-04-04 PROCEDURE — 258N000003 HC RX IP 258 OP 636: Performed by: HOSPITALIST

## 2022-04-04 PROCEDURE — 36415 COLL VENOUS BLD VENIPUNCTURE: CPT

## 2022-04-04 RX ADMIN — FAMOTIDINE 40 MG: 20 TABLET ORAL at 10:03

## 2022-04-04 RX ADMIN — ONDANSETRON 4 MG: 2 INJECTION INTRAMUSCULAR; INTRAVENOUS at 15:28

## 2022-04-04 RX ADMIN — VANCOMYCIN HYDROCHLORIDE 1500 MG: 5 INJECTION, POWDER, LYOPHILIZED, FOR SOLUTION INTRAVENOUS at 00:59

## 2022-04-04 RX ADMIN — ENOXAPARIN SODIUM 40 MG: 40 INJECTION SUBCUTANEOUS at 11:45

## 2022-04-04 RX ADMIN — VANCOMYCIN HYDROCHLORIDE 1250 MG: 5 INJECTION, POWDER, LYOPHILIZED, FOR SOLUTION INTRAVENOUS at 19:48

## 2022-04-04 RX ADMIN — PIPERACILLIN SODIUM AND TAZOBACTAM SODIUM 3.38 G: 3; .375 INJECTION, POWDER, LYOPHILIZED, FOR SOLUTION INTRAVENOUS at 03:04

## 2022-04-04 RX ADMIN — VANCOMYCIN HYDROCHLORIDE 1250 MG: 5 INJECTION, POWDER, LYOPHILIZED, FOR SOLUTION INTRAVENOUS at 12:11

## 2022-04-04 ASSESSMENT — ACTIVITIES OF DAILY LIVING (ADL)
ADLS_ACUITY_SCORE: 7

## 2022-04-04 NOTE — PLAN OF CARE
Goal Outcome Evaluation:    Plan of care discussed with patient and family at bedside.     A/O x 4.  VSS on RA. R chest tube incision site--2 dry scabs; +2 edema around insertion site; tender to palpitation.  LS --clear x all lobes; RLL/LLL-diminished equally bilaterally. LIM.  C/O nausea; relief with PRN Zofran; no appetite; ; no coverage given.  Continent of bowel and bladder. Up independently in room. Took a shower this evening.  Discharge pending plan of care.

## 2022-04-04 NOTE — PHARMACY-VANCOMYCIN DOSING SERVICE
Pharmacy Vancomycin Note  Date of Service 2022  Patient's  1980   41 year old, male    Indication: Pleurex catheter infection  Day of Therapy: 2  Current vancomycin regimen:  1500 mg IV q12h  Current vancomycin monitoring method: AUC  Current vancomycin therapeutic monitoring goal: 400-600 mg*h/L    InsightRX Prediction of Current Vancomycin Regimen  Loading dose: N/A  Regimen: 1500 mg IV every 12 hours.  Start time: 12:59 on 2022  Exposure target: AUC24 (range)400-600 mg/L.hr   AUC24,ss: 358 mg/L.hr  Probability of AUC24 > 400: 28 %  Ctrough,ss: 7.0 mg/L  Probability of Ctrough,ss > 20: 0 %  Probability of nephrotoxicity (Lodise CURTIS ): 4 %      Current estimated CrCl = Estimated Creatinine Clearance: 199.6 mL/min (A) (based on SCr of 0.54 mg/dL (L)).    Creatinine for last 3 days  2022: 11:14 AM Creatinine 0.54 mg/dL  4/3/2022:  7:47 AM Creatinine 0.54 mg/dL  2022:  8:41 AM Creatinine 0.54 mg/dL    Recent Vancomycin Levels (past 3 days)  2022:  8:41 AM Vancomycin 7.4 mg/L    Vancomycin IV Administrations (past 72 hours)                   vancomycin 1500 mg in 0.9% NaCl 250 ml intermittent infusion 1,500 mg (mg) 1,500 mg New Bag 22 0059     1,500 mg New Bag 22 1342     1,500 mg New Bag  0037    vancomycin (VANCOCIN) 2,000 mg in sodium chloride 0.9 % 500 mL intermittent infusion (mg) 2,000 mg New Bag 22 1338                Nephrotoxins and other renal medications (From now, onward)    Start     Dose/Rate Route Frequency Ordered Stop    22 0100  vancomycin 1500 mg in 0.9% NaCl 250 ml intermittent infusion 1,500 mg         1,500 mg  over 90 Minutes Intravenous EVERY 12 HOURS 22 2112               Contrast Orders - past 72 hours (72h ago, onward)            None          Interpretation of levels and current regimen:  Vancomycin level is reflective of AUC less than 400    Has serum creatinine changed greater than 50% in last 72 hours: No    Urine  output:  unable to determine    Renal Function: Stable    InsightRX Prediction of Planned New Vancomycin Regimen  Loading dose: N/A  Regimen: 1250 mg IV every 8 hours.  Start time: 12:59 on 04/04/2022  Exposure target: AUC24 (range)400-600 mg/L.hr   AUC24,ss: 447 mg/L.hr  Probability of AUC24 > 400: 72 %  Ctrough,ss: 11 mg/L  Probability of Ctrough,ss > 20: 1 %  Probability of nephrotoxicity (Lodise CURTIS 2009): 7 %      Plan:  1. Increase Dose to 1250 mg q8h  2. Vancomycin monitoring method: AUC  3. Vancomycin therapeutic monitoring goal: 400-600 mg*h/L  4. Pharmacy will check vancomycin levels as appropriate in 1-3 Days.  5. Serum creatinine levels will be ordered daily for the first week of therapy and at least twice weekly for subsequent weeks.    Kiera Valenzuela, [facility]

## 2022-04-04 NOTE — PROGRESS NOTES
Pt has full coverage for iv abx through their [place] Medicaid plan with a monthly deductible of $3.55.         (GENAR[facility] Thurman) In reference to admission date 04/02/2022 to check for iv abx coverage.           Please contact Intake with any questions, [phone] or In Basket pool, [facility] ([phone]).

## 2022-04-04 NOTE — PLAN OF CARE
Goal Outcome Evaluation:      0747-2346:    A/O x4. VSS on 0.5 L NC as needed, cont pulse ox.  Up ind in room. On Consistent carb diet, tolerating well. Q4h VS. Denied N/V/SOB. C/o little tender at removed drain Right lower chest site, no med given.  L PIV SL with int abx. Continue to monitor care.Thoracic sx following, to continue abx as planned. Discharge pending improvement.

## 2022-04-04 NOTE — PLAN OF CARE
Goal Outcome Evaluation:      A&Ox4. VSS on room air. Blood sugar check, coverage given per order. Complained of chest discomfort, which got better as shift went on. Tender at chest tube removal site. Up ind in room. IV SL. discharge pending.

## 2022-04-04 NOTE — PROGRESS NOTES
"[facility]    Medicine Progress Note - Hospitalist Service    Date of Admission:  4/2/2022    Assessment & Plan            Connor Emerson is a 41 year old male admitted on 4/2/2022.   PMHx of metastatic lung cancer with mets to the brain and R sided loculated pleural effusion for which he has a PleurX catheter, hypertension, DM II, GERD and anxiety who was admitted on 4/2/2022 for sepsis dt infected PleurX catheter.      Severe sepsis dt infected R pleurex catheter, s/p removal per IR on 4/2/22   Loculated malignant R pleural effusion  * Underwent placement of PleurX catheter per interventional pulm at [facility] on 3/9/22 for mgmt of loculated malignant effusion. Follows with pulm at the . Seen in virtual visit on 3/29 with Dr. Agrawal and felt to be doing well. CTA chest 3/29 showed minimal residual effusion and plan was to remove the PLeurX in the coming weeks.   * Presented to [facility] [place] ED with increased pain that had started the day prior (started 4/1).   * Afebrile, initially tachycardic but BPs stable. WBC 22.7, lactate 2.9.   * CT chest obtained and showed \"Pleural catheter remains in the pleural space, however, fluid and stranding does appear to track outside the pleural space into the chest wall along the pleural catheter. This may indicate spread of infection.\"  * Started on broad spectrum abx with Vanco and Zosyn and transferred to [facility] for IR evaluation.  Zosyn stopped 4/4 per ID.   * IR removed PleurX catheter on 4/2 PM. Tip sent for culture  No complications. I&D of raised area of erythema and pus drained per Dr. Adamson  * Thoracic Surg following, no indication for debridement at this time    -- remains afebrile, leukocytosis resolved 4/4  -- tip culture positive for Staph aureus, sensitivities pending  -- ID following, cont Vanco, stop zosyn  -- weaned to room air     Metastatic lung cancer with mets to the brain  * Malignancy diagnosed in 1/2022. Follows with Dr. Presaud of [facility] " "oncology.   * S/p radiation for brain mets in 2/2022,  * Currrently on oral chemo with alectinib 450mg po BID though pt held after 4/1 AM dose per his oncologist recs given development of pain as above.   -- holding oral chemo  -- OP follow up with oncology after discharge (has appt with Cori on 4/6, may need to reschedule)     DM II, uncontrolled  * A1c 10.1. Manages with Lantus 10U daily.   -- Lantus 20U BID, high dose ssi  -- start aspart 1u/10g carb with meals     Hypertension  * Not on BP meds at baseline. BPs stable this stay     GERD  Chronic and stable on H2 blocker     Anxiety  * Not on meds at baseline    Moderate malnutrition   Has had >7.5% weight loss in 3 months and <75% oral intake for >7 days.   - nutrition following         Diet: Moderate Consistent Carb (60 g CHO per Meal) Diet    DVT Prophylaxis: Enoxaparin (Lovenox) SQ  Heart Catheter: Not present  Central Lines: None  Cardiac Monitoring: None  Code Status: Full Code      Disposition Plan   Expected Discharge: 04/06/2022     Anticipated discharge location:  Awaiting care coordination huddle  Delays:            The patient's care was discussed with the Bedside Nurse and Patient.    Anmol Ordoñez MD  Hospitalist Service  [facility]  Securely message with the Vocera Web Console (learn more here)  Text page via VoltServer Paging/Directory         Clinically Significant Risk Factors Present on Admission             # Obesity: Estimated body mass index is 30.27 kg/m  as calculated from the following:    Height as of an earlier encounter on 4/2/22: 1.753 m (5' 9\").    Weight as of an earlier encounter on 4/2/22: 93 kg (205 lb).      ______________________________________________________________________    Interval History   Reports some productive cough overnight and mild wheeze but no dyspnea or fever/chills.  Pain is improving.  No rash or diarrhea with antibiotics.  No other complaints.     Data reviewed today: I reviewed all " medications, new labs and imaging results over the last 24 hours. I personally reviewed no images or EKG's today.    Physical Exam   Vital Signs: Temp: 99  F (37.2  C) Temp src: Oral BP: (!) 145/96 Pulse: 88   Resp: 17 SpO2: 94 % O2 Device: None (Room air) Oxygen Delivery: 1/2 LPM  Weight: 0 lbs 0 oz  General Appearance: Young male in NAD  Respiratory: very mild expiratory wheeze, no crackles or tachypnea   Cardiovascular: RRR, normal s1/s2 without murmur  GI: abdomen soft, normal bowel sounds  Skin:   Other: Alert and appropriate, cranial nerves grossly intact      Data   Recent Labs   Lab 04/04/22  0911 04/04/22  0841 04/04/22  0144 04/03/22  1354 04/03/22  0747 04/02/22  1909 04/02/22  1115 04/02/22  1114   WBC  --  9.0  --   --  11.8*  --  22.7*  --    HGB  --  12.2*  --   --  11.8*  --  13.9  --    MCV  --  94  --   --  92  --  92  --    PLT  --  171  --   --  168  --  229  --    INR  --   --   --   --   --   --   --  1.03   NA  --  138  --   --  137  --   --  133   POTASSIUM  --  3.5  --   --  3.5  --   --  3.7   CHLORIDE  --  106  --   --  106  --   --  103   CO2  --  27  --   --  28  --   --  25   BUN  --  10  --   --  10  --   --  9   CR  --  0.54*  --   --  0.54*  --   --  0.54*   ANIONGAP  --  5  --   --  3  --   --  5   TORY  --  8.4*  --   --  8.4*  --   --  8.8   * 299* 196*   < > 305*   < >  --  357*   ALBUMIN  --   --   --   --  2.6*  --   --  3.6   PROTTOTAL  --   --   --   --  5.5*  --   --  6.5*   BILITOTAL  --   --   --   --  0.9  --   --  1.2   ALKPHOS  --   --   --   --  84  --   --  127   ALT  --   --   --   --  12  --   --  20   AST  --   --   --   --  7  --   --  12    < > = values in this interval not displayed.

## 2022-04-04 NOTE — CONSULTS
Care Management Initial Consult    General Information  Assessment completed with: Patient,    Type of CM/SW Visit: CM Role Introduction    Primary Care Provider verified and updated as needed: Yes   Readmission within the last 30 days: no previous admission in last 30 days      Reason for Consult: discharge planning  Advance Care Planning:            Communication Assessment  Patient's communication style: spoken language (English or Bilingual)    Hearing Difficulty or Deaf: no   Wear Glasses or Blind: no    Cognitive  Cognitive/Neuro/Behavioral: WDL                      Living Environment:   People in home: child(elsa), dependent, spouse  Kylie  Current living Arrangements: apartment      Able to return to prior arrangements: yes       Family/Social Support:  Care provided by: self  Provides care for: spouse, child(elsa)  Marital Status:   Wife, Children  Kylie       Description of Support System: Supportive, Involved    Support Assessment: Adequate family and caregiver support, Adequate social supports    Current Resources:   Patient receiving home care services: No     Community Resources: [facility]  Equipment currently used at home: none  Supplies currently used at home: Other (had pluerx supplies )    Employment/Financial:  Employment Status: other (see comments) (currently working)        Financial Concerns: No concerns identified   Referral to Financial Worker: No       Lifestyle & Psychosocial Needs:  Social Determinants of Health     Tobacco Use: Low Risk      Smoking Tobacco Use: Never Smoker     Smokeless Tobacco Use: Never Used   Alcohol Use: Not on file   Financial Resource Strain: Not on file   Food Insecurity: Not on file   Transportation Needs: Not on file   Physical Activity: Not on file   Stress: Not on file   Social Connections: Not on file   Intimate Partner Violence: Not on file   Depression: Not at risk     PHQ-2 Score: 0   Housing Stability: Not on file       Functional  Status:  Prior to admission patient needed assistance: independent                   Additional Information:  Writer met with the patient at the bedside. Patient is A+Ox4 up independently. Writer went over role and scope of safe discharge planning.  Patient is being followed by Hospitalist, Nutrition, Thoracic and ID during this stay.  He has been weaned to room air and does not have oxygen at home.  Patient is continuing to work and has a follow up [facility] 4/6 he states that this may need to be rescheduled.  Will send a benefit check for [facility] in case there is an extended need for IV antx.  Patient is aware that we will continue to follow him for discharge planning and needs as identified.      Kerry Larry RN

## 2022-04-04 NOTE — PROGRESS NOTES
[facility]    Infectious Disease Progress Note    Date of Service : 04/04/2022     Assessment:  1. Right sided chest wall soft tissue infection at pleurx cathter insertion site. No drainable abscess a this time. Cathter tip growing S.aurues pending sensitivities.   2. Loculated right malignant pleural effusion which was being drained by the pleurx cathter , unclear whether effusion is infected  3. Metastatic non small cell lung cancer (s/p radiation/ on PO chemotherapy) with malignant right pleural effusion and brain metastases, s/p gamma knife treatment on 2/15  4. Uncontrolled diabetes with HbA1c of 10.1%     Recommendations  1. Appreciate thoracic surgical evaluation  2. Discontinue Zosyn, continue vancomycin for now. Antibiotic de-escalation based on sensitivities  3. Follow cx/sensitivities  4. Follow clinical status, given poor glycemic control , infection may evolve into an abscess, will continue to monitor      Cristina Mathis MD    Interval History   Low grade fever last night, leukocytosis improving, still significant tenderness and some swelling right chest wall but feels a little better. No appetite    Antimicrobial therapy  4/1 Vancomycin  Prior  4/1-4/3 Zosyn    Physical Exam   Temp: 98.1  F (36.7  C) Temp src: Oral BP: (!) 136/92 Pulse: 68   Resp: 16 SpO2: 95 % O2 Device: Nasal cannula Oxygen Delivery: 1/2 LPM  There were no vitals filed for this visit.  Vital Signs with Ranges  Temp:  [98.1  F (36.7  C)-100.1  F (37.8  C)] 98.1  F (36.7  C)  Pulse:  [68-96] 68  Resp:  [16-20] 16  BP: (113-143)/(68-92) 136/92  SpO2:  [93 %-97 %] 95 %    GENERAL APPEARANCE:  awake  EYES: Eyes grossly normal to inspection  NECK: no adenopathy  RESP: lungs clear  Chest wall : right sided chest wall swelling and tenderness   CV: regular rates and rhythm  ABDOMEN: soft, nontender  MS: extremities normal  SKIN: tattooes    Other:    Medications       famotidine  40 mg Oral Daily     insulin aspart  1-10  Units Subcutaneous TID AC     insulin aspart  1-7 Units Subcutaneous At Bedtime     insulin glargine  20 Units Subcutaneous BID     piperacillin-tazobactam  3.375 g Intravenous Q6H     sodium chloride (PF)  3 mL Intracatheter Q8H     sodium chloride (PF)  3 mL Intracatheter Q8H     vancomycin  1,500 mg Intravenous Q12H       Data   All microbiology laboratory data reviewed.  Recent Labs   Lab Test 04/03/22  0747 04/02/22  1115 03/17/22  1055   WBC 11.8* 22.7* 7.1   HGB 11.8* 13.9 13.1*   HCT 35.4* 42.3 41.6   MCV 92 92 94    229 241     Recent Labs   Lab Test 04/03/22  0747 04/02/22  1114 03/17/22  1055   CR 0.54* 0.54* 0.59*     Recent Labs   Lab Test 06/01/16  1417   SED 7     Microbiology  4/2 blood cx negative  4/2 Right pleural catheter s.aurues   Pleural Cavity, Right; Foreign Body         0 Result Notes    Culture Culture in progress       3+ Staphylococcus aureus         Imaging  4/2 CT chest  EXAM: CT CHEST WITH CONTRAST  LOCATION: [facility]  DATE/TIME: 04/02/2022, 12:14 PM     INDICATION: Right-sided Pleurx catheter. Appears to have erythema infection posterior to insertion site. Evaluate abscess and catheter location.  COMPARISON: None.  TECHNIQUE: CT chest with IV contrast. Multiplanar reformats were obtained. Dose reduction techniques were used.     CONTRAST: 100 mL Isovue 370.     FINDINGS:   LUNGS AND PLEURA: Loculated effusion on the right with Pleurx catheter in place. Some associated compressive atelectasis and/or infiltrate. Fluid does appear to track along the catheter into the chest wall.     MEDIASTINUM/AXILLAE: A few mildly prominent lymph nodes are noted which are nonspecific and may be reactive in this setting. No aneurysm.     CORONARY ARTERY CALCIFICATION: None.     UPPER ABDOMEN: No acute findings.     MUSCULOSKELETAL: No frankly destructive bony lesions.                                                                      IMPRESSION:   1.  Pleural catheter  remains in the pleural space, however, fluid and stranding does appear to track outside the pleural space into the chest wall along the pleural catheter. This may indicate spread of infection

## 2022-04-05 VITALS
DIASTOLIC BLOOD PRESSURE: 94 MMHG | HEART RATE: 75 BPM | RESPIRATION RATE: 16 BRPM | SYSTOLIC BLOOD PRESSURE: 142 MMHG | TEMPERATURE: 98.3 F | OXYGEN SATURATION: 93 %

## 2022-04-05 LAB
BACTERIA SPEC CULT: ABNORMAL
CREAT SERPL-MCNC: 0.62 MG/DL (ref 0.66–1.25)
CRP SERPL-MCNC: 97.7 MG/L (ref 0–8)
GFR SERPL CREATININE-BSD FRML MDRD: >90 ML/MIN/1.73M2
GLUCOSE BLDC GLUCOMTR-MCNC: 132 MG/DL (ref 70–99)
GLUCOSE BLDC GLUCOMTR-MCNC: 166 MG/DL (ref 70–99)

## 2022-04-05 PROCEDURE — 250N000011 HC RX IP 250 OP 636: Performed by: HOSPITALIST

## 2022-04-05 PROCEDURE — 99239 HOSP IP/OBS DSCHRG MGMT >30: CPT | Performed by: HOSPITALIST

## 2022-04-05 PROCEDURE — 250N000012 HC RX MED GY IP 250 OP 636 PS 637: Performed by: HOSPITALIST

## 2022-04-05 PROCEDURE — 258N000003 HC RX IP 258 OP 636: Performed by: HOSPITALIST

## 2022-04-05 PROCEDURE — 36415 COLL VENOUS BLD VENIPUNCTURE: CPT | Performed by: SPECIALIST

## 2022-04-05 PROCEDURE — 250N000013 HC RX MED GY IP 250 OP 250 PS 637: Performed by: HOSPITALIST

## 2022-04-05 PROCEDURE — 86140 C-REACTIVE PROTEIN: CPT | Performed by: SPECIALIST

## 2022-04-05 PROCEDURE — 250N000011 HC RX IP 250 OP 636: Performed by: SPECIALIST

## 2022-04-05 PROCEDURE — 82565 ASSAY OF CREATININE: CPT | Performed by: HOSPITALIST

## 2022-04-05 RX ORDER — CEFADROXIL 500 MG/1
500 CAPSULE ORAL 2 TIMES DAILY
Qty: 20 CAPSULE | Refills: 0 | Status: SHIPPED | OUTPATIENT
Start: 2022-04-05 | End: 2022-04-15

## 2022-04-05 RX ORDER — CEFAZOLIN SODIUM 2 G/100ML
2 INJECTION, SOLUTION INTRAVENOUS EVERY 8 HOURS
Status: DISCONTINUED | OUTPATIENT
Start: 2022-04-05 | End: 2022-04-05 | Stop reason: HOSPADM

## 2022-04-05 RX ADMIN — INSULIN ASPART 2 UNITS: 100 INJECTION, SOLUTION INTRAVENOUS; SUBCUTANEOUS at 09:39

## 2022-04-05 RX ADMIN — FAMOTIDINE 40 MG: 20 TABLET ORAL at 08:48

## 2022-04-05 RX ADMIN — CEFAZOLIN SODIUM 2 G: 2 INJECTION, SOLUTION INTRAVENOUS at 08:41

## 2022-04-05 RX ADMIN — VANCOMYCIN HYDROCHLORIDE 1250 MG: 5 INJECTION, POWDER, LYOPHILIZED, FOR SOLUTION INTRAVENOUS at 04:18

## 2022-04-05 ASSESSMENT — ACTIVITIES OF DAILY LIVING (ADL)
WALKING_OR_CLIMBING_STAIRS_DIFFICULTY: NO
FALL_HISTORY_WITHIN_LAST_SIX_MONTHS: NO
ADLS_ACUITY_SCORE: 3
DOING_ERRANDS_INDEPENDENTLY_DIFFICULTY: NO
CONCENTRATING,_REMEMBERING_OR_MAKING_DECISIONS_DIFFICULTY: NO
ADLS_ACUITY_SCORE: 3
TOILETING_ISSUES: NO
ADLS_ACUITY_SCORE: 3
WEAR_GLASSES_OR_BLIND: NO
CHANGE_IN_FUNCTIONAL_STATUS_SINCE_ONSET_OF_CURRENT_ILLNESS/INJURY: NO
ADLS_ACUITY_SCORE: 3
DRESSING/BATHING_DIFFICULTY: NO
ADLS_ACUITY_SCORE: 7
ADLS_ACUITY_SCORE: 3
DIFFICULTY_EATING/SWALLOWING: NO
ADLS_ACUITY_SCORE: 3

## 2022-04-05 NOTE — PROGRESS NOTES
[facility]    Infectious Disease Progress Note    Date of Service : 04/05/2022     Assessment:  1. Right sided chest wall soft tissue infection at pleurx cathter insertion site. No drainable abscess a this time. Cathter tip growing S.aureus MSSA  2. Loculated right malignant pleural effusion which was being drained by the pleurx cathter , unclear whether effusion is infected  3. Metastatic non small cell lung cancer (s/p radiation/ on PO chemotherapy) with malignant right pleural effusion and brain metastases, s/p gamma knife treatment on 2/15  4. Uncontrolled diabetes with HbA1c of 10.1%     Recommendations  1. Vancomycin to Cefazolin  2. Can transition to oral Cefadroxil 500 mg PO BID at diacharge for 10 days  3. Glycemic control    Cristina Mathis MD    Interval History   Feels a lot better, chest wall tenderness and induration have improved, tolerating antibiotics without side effects    Antimicrobial therapy  4/1 Vancomycin  Prior  4/1-4/3 Zosyn    Physical Exam   Temp: 98.3  F (36.8  C) Temp src: Oral BP: (!) 143/89 Pulse: 74   Resp: 16 SpO2: 92 % O2 Device: None (Room air)    There were no vitals filed for this visit.  Vital Signs with Ranges  Temp:  [98.1  F (36.7  C)-99  F (37.2  C)] 98.3  F (36.8  C)  Pulse:  [74-93] 74  Resp:  [16-18] 16  BP: (128-145)/(81-99) 143/89  SpO2:  [90 %-95 %] 92 %    GENERAL APPEARANCE:  awake  EYES: Eyes grossly normal to inspection  NECK: no adenopathy  RESP: lungs clear  Chest wall : right sided chest wall swelling and tenderness improved   CV: regular rates and rhythm  ABDOMEN: soft, nontender  MS: extremities normal  SKIN: tattooes    Other:    Medications       enoxaparin ANTICOAGULANT  40 mg Subcutaneous Q24H     famotidine  40 mg Oral Daily     insulin aspart   Subcutaneous TID AC     insulin aspart  1-10 Units Subcutaneous TID AC     insulin aspart  1-7 Units Subcutaneous At Bedtime     insulin glargine  20 Units Subcutaneous BID     sodium  chloride (PF)  3 mL Intracatheter Q8H     sodium chloride (PF)  3 mL Intracatheter Q8H     vancomycin  1,250 mg Intravenous Q8H       Data   All microbiology laboratory data reviewed.  Recent Labs   Lab Test 04/04/22  0841 04/03/22  0747 04/02/22  1115   WBC 9.0 11.8* 22.7*   HGB 12.2* 11.8* 13.9   HCT 37.7* 35.4* 42.3   MCV 94 92 92    168 229     Recent Labs   Lab Test 04/04/22  0841 04/03/22  0747 04/02/22  1114   CR 0.54* 0.54* 0.54*     Recent Labs   Lab Test 06/01/16  1417   SED 7     Microbiology  4/2 blood cx negative  4/2 Right pleural catheter s.aureus  Pleural Cavity, Right; Foreign Body          0 Result Notes    Culture 3+ Staphylococcus aureus Abnormal             Resulting Agency: [facility]       Susceptibility     Staphylococcus aureus     TRUNG     Clindamycin <=0.25 ug/mL Susceptible     Erythromycin <=0.25 ug/mL Susceptible     Gentamicin <=0.5 ug/mL Susceptible     Oxacillin <=0.25 ug/mL Susceptible 1     Tetracycline <=1.0 ug/mL Susceptible     Trimethoprim/Sulfamethoxazole <=0.5/9.5 u... Susceptible     Vancomycin <=0.5 ug/mL Susceptible                   Imaging  4/2 CT chest  EXAM: CT CHEST WITH CONTRAST  LOCATION: [facility]  DATE/TIME: 04/02/2022, 12:14 PM     INDICATION: Right-sided Pleurx catheter. Appears to have erythema infection posterior to insertion site. Evaluate abscess and catheter location.  COMPARISON: None.  TECHNIQUE: CT chest with IV contrast. Multiplanar reformats were obtained. Dose reduction techniques were used.     CONTRAST: 100 mL Isovue 370.     FINDINGS:   LUNGS AND PLEURA: Loculated effusion on the right with Pleurx catheter in place. Some associated compressive atelectasis and/or infiltrate. Fluid does appear to track along the catheter into the chest wall.     MEDIASTINUM/AXILLAE: A few mildly prominent lymph nodes are noted which are nonspecific and may be reactive in this setting. No aneurysm.     CORONARY ARTERY CALCIFICATION:  None.     UPPER ABDOMEN: No acute findings.     MUSCULOSKELETAL: No frankly destructive bony lesions.                                                                      IMPRESSION:   1.  Pleural catheter remains in the pleural space, however, fluid and stranding does appear to track outside the pleural space into the chest wall along the pleural catheter. This may indicate spread of infection

## 2022-04-05 NOTE — PROGRESS NOTES
Care Management Discharge Note    Discharge Date: 04/05/2022       Discharge Disposition: Home    Discharge Services: None    Discharge DME: None    Discharge Transportation: family or friend will provide    Private pay costs discussed: Not applicable    PAS Confirmation Code:    Patient/family educated on Medicare website which has current facility and service quality ratings:      Education Provided on the Discharge Plan:    Persons Notified of Discharge Plans: bedside RN  Patient/Family in Agreement with the Plan: yes    Handoff Referral Completed: Yes    Additional Information:  Patient ready for discharge home.  Met with pt to discuss discharge home and follow-up scheduled for April 11th.  Per pt, no needs for discharge as pt has all supplies at home.  Pt's spouse will be transporting pt home today.    ZAHIDA Boucher RN, BSN, OCN   Inpatient Care Coordination [address]  Office: [phone]

## 2022-04-05 NOTE — PLAN OF CARE
Goal Outcome Evaluation:      A&Ox4, VSS on RA ex slight HTN. Denies SOB and pain. Reports tenderness with palpitation at previous pleurX site. Site with dried scabs MCKENZIE. Up ind in room. R PIV SL with int abx. Plan pending overall improvement.

## 2022-04-05 NOTE — PLAN OF CARE
Goal Outcome Evaluation:      Discharge Note    Patient discharged to home via private vehicle  accompanied by significant other .  IV: Discontinued  Prescriptions filled and given to patient/family.   Belongings reviewed and sent with patient.   Home medications returned to patient: NA  Equipment sent with: patient, N/A.   patient verbalizes understanding of discharge instructions. AVS given to patient.

## 2022-04-05 NOTE — DISCHARGE SUMMARY
[facility]  Hospitalist Discharge Summary      Date of Admission:  4/2/2022  Date of Discharge:  4/5/2022  Discharging Provider: Anmol Ordoñez MD  Discharge Service: Hospitalist Service    Discharge Diagnoses   Severe sepsis due to MSSA infection of right PleurX catheter s/p removal  Hx loculated malignant right pleural effusion  Metastatic lung cancer with mets to the brain  DM II, uncontrolled, on long term insulin  HTN  GERD  Anxiety  Moderate malnutrition     Follow-ups Needed After Discharge   Follow-up Appointments     Follow-up and recommended labs and tests       Follow up with Cori of [facility] oncology as scheduled next Monday.             Unresulted Labs Ordered in the Past 30 Days of this Admission     Date and Time Order Name Status Description    4/2/2022  3:07 PM Blood Culture Peripheral Blood Preliminary     4/2/2022  2:30 PM Blood Culture Peripheral Blood Preliminary       These results will be followed up by: Hospitalist service     Discharge Disposition   Discharged to home  Condition at discharge: Stable    Hospital Course              Connor Emerson is a 41 year old male admitted on 4/2/2022.   PMHx of metastatic lung cancer with mets to the brain and R sided loculated pleural effusion for which he has a PleurX catheter, hypertension, DM II, GERD and anxiety who was admitted on 4/2/2022 for sepsis dt infected PleurX catheter.        Severe sepsis dt MSSA infection of R pleurex catheter, s/p removal per IR on 4/2/22   Hx Loculated malignant R pleural effusion  Underwent placement of PleurX catheter at [facility] on 3/9/22 for mgmt of loculated malignant effusion with plan for removal in upcoming weeks.  He presented with onset of pain at tube site starting 4/1; at arrival was tachycardic with leukocytosis (22.7) and elevated lactic acid (2.9).  CT chest showed fluid and stranding tracking outside the pleural space into chest wall along pleural catheter.  IR was consulted and  removed catheter 4/2 with report of pustular drainage and tip culture growing MSSA.  Thoracic Surg was consulted who felt no surgical indication necessary given minimal pleural fluid and lack of any signs of abscess.  Initially treated with broad spectrum coverage for sepsis, narrowed to Ancef once sensitivities returned with plan to transition to cefadroxil for an additional 10 days at discharge per ID.     Metastatic lung cancer with mets to the brain  Malignancy diagnosed in 1/2022. Follows with Dr. Persaud of [facility] oncology.  S/p radiation for brain mets in 2/2022.  Oral chemo was held in setting of sepsis and he has outpatient follow up with Oncology within 1 week to discuss resumption.      DM II, uncontrolled, on long term insulin  A1c 10.1.  PTA Lantus of 10 units daily was increased to 20 units bid with much improvement in glucose control.  Will continue at discharge.      Hypertension  Not on antihypertensives at baseline.  Mildly hypertensive this admission.  Follow up with PCP.      GERD  Chronic and stable on H2 blocker     Anxiety  Not on meds at baseline    Moderate malnutrition   Has had >7.5% weight loss in 3 months and <75% oral intake for >7 days.  Nutrition consulted.         Consultations This Hospital Stay   NUTRITION SERVICES ADULT IP CONSULT  INFECTIOUS DISEASES IP CONSULT  PHARMACY TO DOSE VANCO  CARE MANAGEMENT / SOCIAL WORK IP CONSULT  THORACIC SURGERY IP CONSULT    Code Status   Full Code    Time Spent on this Encounter   I, Anmol Ordoñez MD, personally saw the patient today and spent greater than 30 minutes discharging this patient.       Anmol Ordoñez MD  [place] [address] ONCOLOGY  [facility] [address]  [facility] [address]  Phone: [phone]  ______________________________________________________________________    Physical Exam   Vital Signs: Temp: 98.3  F (36.8  C) Temp src: Oral BP: (!) 142/94 Pulse: 75   Resp: 16 SpO2: 93 % O2 Device: None (Room air)     Weight: 0 lbs 0 oz  General Appearance: Young male in NAD  Respiratory: lungs CTAB, no wheezes or crackles, no tachypnea   Cardiovascular: RRR, normal s1/s2 without murmur  GI: abdomen soft, normal bowel sounds  Skin: no fluctuance or erythema overlying prior PleurX catheter site, only minimal tenderness to palpation  Other: Alert and appropriate, cranial nerves grossly intact         Primary Care Physician   Radha Shetty    Discharge Orders      Reason for your hospital stay    You were admitted for infected PleurX catheter which has been removed and your infection is improving with antibiotics.     Follow-up and recommended labs and tests     Follow up with Cori of [facility] oncology as scheduled next Monday.     Activity    Your activity upon discharge: activity as tolerated     Diet    Follow this diet upon discharge:       Moderate Consistent Carb (60 g CHO per Meal) Diet       Significant Results and Procedures   Most Recent 3 CBC's:Recent Labs   Lab Test 04/04/22  0841 04/03/22  0747 04/02/22  1115   WBC 9.0 11.8* 22.7*   HGB 12.2* 11.8* 13.9   MCV 94 92 92    168 229     Most Recent 3 BMP's:Recent Labs   Lab Test 04/05/22  0918 04/05/22  0724 04/05/22  0155 04/04/22  2052 04/04/22  0911 04/04/22  0841 04/03/22  1354 04/03/22  0747 04/02/22  1909 04/02/22  1114   NA  --   --   --   --   --  138  --  137  --  133   POTASSIUM  --   --   --   --   --  3.5  --  3.5  --  3.7   CHLORIDE  --   --   --   --   --  106  --  106  --  103   CO2  --   --   --   --   --  27  --  28  --  25   BUN  --   --   --   --   --  10  --  10  --  9   CR  --  0.62*  --   --   --  0.54*  --  0.54*  --  0.54*   ANIONGAP  --   --   --   --   --  5  --  3  --  5   TORY  --   --   --   --   --  8.4*  --  8.4*  --  8.8   *  --  132* 152*   < > 299*   < > 305*   < > 357*    < > = values in this interval not displayed.     Most Recent 2 LFT's:Recent Labs   Lab Test 04/03/22  0747 04/02/22  1114   AST 7 12   ALT 12 20    ALKPHOS 84 127   BILITOTAL 0.9 1.2     Most Recent ESR & CRP:Recent Labs   Lab Test 04/05/22  0724 04/27/17  1013 06/01/16  1417   SED  --   --  7   CRP 97.7*   < >  --     < > = values in this interval not displayed.   ,   Results for orders placed or performed during the hospital encounter of 04/02/22   IR Chest Tube Removal Tunneled Right    Narrative    41-year-old patient with a right-sided tunneled Pleurx catheter,  thought to be placed February 16, 2022. Patient presents with  increasing pain and inflammatory change on CT exam, plan is for  removal due to presumed infection.    TECHNIQUE: Patient was brought to the interventional radiology  department and informed consent obtained. Patient was placed in a left  lateral decubitus position. The catheter and surrounding skin were  prepped and draped in standard sterile fashion. 1% lidocaine was used  for local anesthesia. With sharp and blunt dissection, the  subcutaneous cuff was removed from the surrounding soft tissue. The  tube was then removed and the tip was placed in a sterile container  and sent to the lab for evaluation. There was an additional area  posterior to initial access point that appeared to be a boil at the  skin surface. 1% lidocaine was used and an incision and drainage was  performed as pus was expelled from the tract. Vaseline gauze was  applied to the skin entry site and additional dressing applied to the  secondary bone drainage site. Patient tolerated the procedure well.    Sedation: A moderate level of sedation was achieved with 1.5 mg IV  Versed and 75 mcg IV fentanyl.  Sedation time: 17 minutes  Please note the above medications administered by the interventional  radiology staff under my direct supervision.    Vital signs were monitored and remained stable throughout the  procedure.  Fluoroscopic time: None  Contrast: None  Local anesthetic: 25 mL of 1% lidocaine.  Imaging: None.      Impression    IMPRESSION:   1. Successful  removal right tunneled pleural Pleurx catheter. Tip of  the catheter sent to the lab for evaluation.  2. Incision and drainage of a site along the tract, a suspected  infected boil, as pus was discharged after removal of the tube.    LV LÓPEZ MD         SYSTEM ID:  [id number]       Discharge Medications   Current Discharge Medication List      START taking these medications    Details   cefadroxil (DURICEF) 500 MG capsule Take 1 capsule (500 mg) by mouth 2 times daily for 10 days  Qty: 20 capsule, Refills: 0    Associated Diagnoses: Complication of chest tube, initial encounter         CONTINUE these medications which have CHANGED    Details   insulin glargine (LANTUS PEN) 100 UNIT/ML pen Inject 20 Units Subcutaneous 2 times daily  Qty: 15 mL, Refills: 0    Comments: If Lantus is not covered by insurance, may substitute Basaglar or Semglee or other insulin glargine product per insurance preference at same dose and frequency.    Associated Diagnoses: Type 2 diabetes mellitus with hyperglycemia, with long-term current use of insulin (H)         CONTINUE these medications which have NOT CHANGED    Details   alectinib (ALECENSA) 150 MG CAPS Take 450 mg by mouth 2 times daily   Qty: 240 capsule, Refills: 11    Comments: Patient receiving free drug from . This is a place [facility] for medication review.      famotidine (PEPCID) 40 MG tablet Take 40 mg by mouth daily      metoclopramide (REGLAN) 5 MG tablet Take 1 tablet (5 mg) by mouth 3 times daily as needed (hiccups)  Qty: 30 tablet, Refills: 1    Associated Diagnoses: Hiccups      oxyCODONE (ROXICODONE) 5 MG tablet Take 1 tablet (5 mg) by mouth every 6 hours as needed for pain  Qty: 30 tablet, Refills: 0    Associated Diagnoses: Cancer associated pain      prochlorperazine (COMPAZINE) 10 MG tablet Take 1 tablet (10 mg) by mouth every 6 hours as needed (Nausea/Vomiting)  Qty: 30 tablet, Refills: 2    Associated Diagnoses: Malignant neoplasm of lower lobe  of right lung (H)      alcohol swab prep pads Use to swab area of injection/jabier as directed.  Qty: 100 each, Refills: 3    Associated Diagnoses: Type 2 diabetes mellitus with hyperglycemia, with long-term current use of insulin (H)      blood glucose (NO BRAND SPECIFIED) lancets standard Use to test blood sugar 1 times daily or as directed.  Qty: 100 each, Refills: 0    Associated Diagnoses: Type 2 diabetes mellitus with hyperglycemia, without long-term current use of insulin (H)      !! blood glucose (NO BRAND SPECIFIED) test strip Use to test blood sugar 2 times daily or as directed. To accompany: Blood Glucose Monitor Brands: per insurance.  Qty: 100 strip, Refills: 6    Associated Diagnoses: Type 2 diabetes mellitus with hyperglycemia, with long-term current use of insulin (H)      !! blood glucose (NO BRAND SPECIFIED) test strip Use to test blood sugar 1 times daily or as directed.  Qty: 1 Box, Refills: 0    Associated Diagnoses: Type 2 diabetes mellitus with hyperglycemia, without long-term current use of insulin (H)      blood glucose calibration (NO BRAND SPECIFIED) solution To accompany: Blood Glucose Monitor Brands: per insurance.  Qty: 1 each, Refills: 0    Associated Diagnoses: Type 2 diabetes mellitus with hyperglycemia, with long-term current use of insulin (H)      !! blood glucose monitoring (NO BRAND SPECIFIED) meter device kit Use to test blood sugar 2 times daily or as directed. Preferred blood glucose meter OR supplies to accompany: Blood Glucose Monitor Brands: per insurance.  Qty: 1 kit, Refills: 0    Associated Diagnoses: Type 2 diabetes mellitus with hyperglycemia, with long-term current use of insulin (H)      !! blood glucose monitoring (NO BRAND SPECIFIED) meter device kit Use to test blood sugar 1 times daily or as directed.  Qty: 1 kit, Refills: 0    Associated Diagnoses: Type 2 diabetes mellitus with hyperglycemia, without long-term current use of insulin (H)      insulin pen needle  (31G X 8 MM) 31G X 8 MM miscellaneous Use one pen needles daily or as directed.  Qty: 100 each, Refills: 0    Associated Diagnoses: Type 2 diabetes mellitus without complication (H)      nystatin (MYCOSTATIN) 704691 UNIT/ML suspension Take 2 mLs (200,000 Units) by mouth 4 times daily  Qty: 60 mL, Refills: 1    Associated Diagnoses: Thrush      thin (NO BRAND SPECIFIED) lancets Use with lanceting device. To accompany: Blood Glucose Monitor Brands: per insurance.  Qty: 100 each, Refills: 6    Associated Diagnoses: Type 2 diabetes mellitus with hyperglycemia, with long-term current use of insulin (H)       !! - Potential duplicate medications found. Please discuss with provider.        Allergies   Allergies   Allergen Reactions     Vicodin [Hydrocodone-Acetaminophen] Nausea and Vomiting and GI Disturbance

## 2022-04-06 ENCOUNTER — PATIENT OUTREACH (OUTPATIENT)
Dept: CARE COORDINATION | Facility: CLINIC | Age: 42
End: 2022-04-06
Payer: MEDICAID

## 2022-04-06 NOTE — PROGRESS NOTES
Clinic Care Coordination Contact  [facility]/Voicemail       Clinical Data: Care Coordinator Outreach  Outreach attempted x 1.  Left message on patient's voicemail with call back information and requested return call.    Plan: Care Coordinator will try to reach patient again in 1-2 business days.    LAURENCE Barboza, B.S. [facility] Care Coordination  [facility]:  [place], [place] and [place]  [phone]  [email or web address]

## 2022-04-06 NOTE — LETTER
[facility] CARE COORDINATION  [facility]  April 7, 2022    Connor Emerson  [id number]   [place] [address]      Dear Connor,    I am a clinic community health worker who works with JERRY Alicia Ra, CNP at the [facility]. I wanted to thank you for spending the time to talk with me.  Below is a description of clinic care coordination and how I can further assist you.      The clinic care coordination team is made up of a registered nurse,  and community health worker who understand the health care system. The goal of clinic care coordination is to help you manage your health and improve access to the health care system in the most efficient manner. The team can assist you in meeting your health care goals by providing education, coordinating services, strengthening the communication among your providers and supporting you with any resource needs.    Please feel free to contact me at [phone] with any questions or concerns. We are focused on providing you with the highest-quality healthcare experience possible and that all starts with you.     Sincerely,     Stephanie Vazquez, LAURENCE, B.S. [facility]  Clinic Care Coordination  [facility]:  [place], [place] and [place]  [phone]  [email or web address]

## 2022-04-07 ENCOUNTER — APPOINTMENT (OUTPATIENT)
Dept: NURSING | Facility: CLINIC | Age: 42
End: 2022-04-07
Payer: MEDICAID

## 2022-04-07 LAB
BACTERIA BLD CULT: NO GROWTH
BACTERIA BLD CULT: NO GROWTH
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
## 2022-04-10 NOTE — PROGRESS NOTES
MEDICAL ONCOLOGY FOLLOW UP NOTE    PATIENT NAME: Connor Emerson  ENCOUNTER DATE: 4/11/2022    Care Team  Primary Oncologist: Bassam Persaud MD    REASON FOR CURRENT VISIT: F/u of lung cancer    HISTORY OF PRESENT ILLNESS:  Mr. Connor Emerson is a 41 year old  male who is a non-smoker with PMHx of T2DM, HTN with metastatic NSCLC comes for follow up     Oncologic Hx:    Diagnosis:   Stage IV NSCLC, Rt lung adenocarcinoma with metastasis to pleura, mediastinum , rt pleural effusion and brain diagnosed 1/2022 (AJCC 8th edition)  PD-L1 TPS 2-3% by [place]   NGS [facility] panel-EML4:ALK rearragement  NGS Guardant- GNAS R201H, KRAS K5E- No ALK    Treatment:   Current:  3/2/22- now- Alectinib 600 mg BID (Dose reduced to 450 mg BID due to grade 3 myalgias 3/21/22)  )  Past:  2/15/22- GK to 11 briain lesions    Intent of treatment: Palliative    Oncologic course:  1/19/22 to 1/22/22-Admitted to [facility] for 2 week progressive SOB secondary to have large rt sided pleural effusion, needing thoracentesis x2 (1.7L and 2.0 L removed), cytology positive for malignancy, adenocarcinoma.   1/26/22- Rt pleural mass biopsy-Dr. Agrawal--POSITIVE FOR ADENOCARCINOMA CONSISTENT WITH LUNG PRIMARY, admixed with mesothelial hyperplasia and inflammatory infiltrate (+ TTF-1 and CK 7;  negative  p40, calretinin and WT-1. PAX8 immunostain focal +). 4th thoracentesis done simultaneously - 3L approx removed.   2/1/22- PET/CT-Right lower lobe central infiltrative FDG avid 8.2 x 9.6 cm mass representing a primary lung adenocarcinoma. Ipsilateral right perihilar, bilateral pretracheal, subcarinal and superior mediastinal michele metastases. Contralateral mildly FDG avid few lung nodules are suspicious for contralateral metastasis. At least 3 intracranial metastases in the right frontal lobe, left frontal lobe and left cerebellar hemisphere. Nonspecific mild diffuse bone marrow uptake. Further evaluation with a spine MRI could be considered to rule out early marrow  infiltration. This could also be seen with red marrow conversion.  2/5/22-  Brain MRI- At least 9 intracranial metastases as detailed above. The dominant lesions involving the orbital right frontal lobe, the posterior left middle frontal gyrus, anterior right temporal lobe and in the left cerebellar hemisphere have surrounding moderate vasogenic type edema.  2/15/22- Saw Dr. Arango from [facility] Onc- Rcd GK to 12 lesion in bran  2/16/22- Pleurex placement   3/2/22- Started Alectinib 600 mg BID  3/21/22- Dose reduced to 450 mg BID due to grade 3 myalgias   4/2/22 to 4/5/22- Admitted at [facility] for- Severe sepsis due to MSSA infection of right PleurX catheter s/p removal- He presented with onset of pain at tube site starting 4/1; at arrival was tachycardic with leukocytosis (22.7) and elevated lactic acid (2.9).  CT chest showed fluid and stranding tracking outside the pleural space into chest wall along pleural catheter.  IR was consulted and removed catheter 4/2 with report of pustular drainage and tip culture growing MSSA.  Thoracic Surg was consulted who felt no surgical indication necessary given minimal pleural fluid and lack of any signs of abscess.  Initially treated with broad spectrum coverage for sepsis, narrowed to Ancef once sensitivities returned with plan to transition to cefadroxil for an additional 10 days at discharge per ID.  Held drug 4/2 to 4/11     He works as a maintenance manger for apartment complexes      Interval Hx:  Patient is here today alone in the clinic for follow up. He has been holding Alectinib since his hospitalizaton.  He had decreased dose to 450 mg BID which had helped with the myalgias and fatigue.  Breathing is stable, no cough, no fever or chills.  He has a few more day of antibiotics left.  He had blurring of vision which is now resolved  Vision tested last week, had change in prescription for glasses  Blood sugars are getting much better, checks daily  He continues to work  daily, mild fatigue, but able to do all the things, independent of ADL and IADL      REVIEW OF SYSTEMS: 14 point ROS negative other than the symptoms noted above in the HPI.    Wt Readings from Last 4 Encounters:   04/02/22 93 kg (205 lb)   03/21/22 93.9 kg (207 lb)   03/09/22 90.8 kg (200 lb 2.8 oz)   02/24/22 93.9 kg (207 lb)      Review of Systems:  A comprehensive ROS was performed and found to be negative or non-contributory with the exception of that noted in the HPI above.    Past Medical History:  GERD  Hypertension, not on medication  Type 2 diabetes mellitus, not on medications currently, previously on Metformin    Past Surgical History:  Past Surgical History:   Procedure Laterality Date     BRONCHOSCOPY RIGID OR FLEXIBLE W/TRANSENDOSCOPIC ENDOBRONCHIAL ULTRASOUND GUIDED Bilateral 1/26/2022    Procedure: Right BRONCHOSCOPY, FIBEROPTIC, endobronchial ultrasound, pleural biopsy;  Surgeon: Dallin Agrawal MD;  Location: [facility] OR     INJECT BLOCK MEDIAL BRANCH CERVICAL/THORACIC/LUMBAR       INSERT CHEST TUBE Right 2/16/2022    Procedure: INSERTION, CATHETER, INTERCOSTAL, FOR DRAINAGE;  Surgeon: Dallin Agrawal MD;  Location: [facility] GI     INSERT CHEST TUBE Right 3/9/2022    Procedure: INSERTION, CATHETER, INTERCOSTAL, FOR DRAINAGE;  Surgeon: Sushila Antonio MD;  Location: [facility] GI     IR CHEST TUBE REMOVAL TUNNELED RIGHT  4/2/2022     ORTHOPEDIC SURGERY      Ganesh. Rotator cuff repair.     PLEUROSCOPY N/A 1/26/2022    Procedure: Pleuroscopy with Pleural Biopsy;  Surgeon: Dallin Agrawal MD;  Location:  OR       Social History:  Lives with wife and 4 kids in [place]. Works as a  for an apartment complex in [place]. Exposure to household chemicals and . No significant exposure to asbestos. No signal exposure to benzene or similar chemicals. No significant smoking history-states that he smoked 1 to 2 cigarettes occasionally per month for about 2 years in college, non-smoking since  then. No significant alcohol use history. No other recreational substances. Good support system. Kids are 23, 19, 17 and 13.    Family History  Significant history for cancers on maternal side. Mother  of uterine cancer. 2 maternal uncles have possible metastatic melanoma.    Outpatient Medications:  Not currently taking any outpatient medications. Has been prescribed Metformin in the past.    Physical Exam:    Blood pressure (!) 169/98, pulse 84, temperature 98.3  F (36.8  C), temperature source Oral, weight 95.6 kg (210 lb 12.8 oz), SpO2 98 %.     General: alert and cooperative, sitting up in chair  HEENT: sclera anicteric, EOMI, MMM. Pupils equal and reactive.   Neck: supple, normal ROM, no lymph nodes palpable.   CV: RRR, no murmurs  Resp: Right chest Pleurx catheter, RL and RML diminished  GI: soft, non-tender, non-distended, bowel sounds present and normoactive  MSK: warm and well-perfused, normal tone  Skin: no rashes on limited exam, no jaundice  Neuro: Alert awake and oriented x4, strength is 5 on 5 throughout, sensations are grossly intact    Labs & Studies: I personally reviewed the following studies:  Most Recent 3 CBC's:  Recent Labs   Lab Test 22  0841 22  0747 22  1115   WBC 9.0 11.8* 22.7*   HGB 12.2* 11.8* 13.9   MCV 94 92 92    168 229     Most Recent 3 BMP's:  Recent Labs   Lab Test 22  0918 22  0724 22  0155 22  2052 22  0911 22  0841 22  1354 22  0747 22  1909 22  1114   NA  --   --   --   --   --  138  --  137  --  133   POTASSIUM  --   --   --   --   --  3.5  --  3.5  --  3.7   CHLORIDE  --   --   --   --   --  106  --  106  --  103   CO2  --   --   --   --   --  27  --  28  --  25   BUN  --   --   --   --   --  10  --  10  --  9   CR  --  0.62*  --   --   --  0.54*  --  0.54*  --  0.54*   ANIONGAP  --   --   --   --   --  5  --  3  --  5   TORY  --   --   --   --   --  8.4*  --  8.4*  --  8.8   *   --  132* 152*   < > 299*   < > 305*   < > 357*    < > = values in this interval not displayed.    Most Recent 2 LFT's:  Recent Labs   Lab Test 04/03/22  0747 04/02/22  1114   AST 7 12   ALT 12 20   ALKPHOS 84 127   BILITOTAL 0.9 1.2    Most Recent TSH and T4:  Recent Labs   Lab Test 01/19/22 2006   TSH 1.81     I reviewed the above labs today.    ASSESSMENT AND PLAN:  Connor Corrales is a 41-year-old male non-smoker with a past medical history of hypertension and diabetes who presented with several weeks of dyspnea and found to have right-sided pleural effusion and a large right lung hilar mass, status post bronchoscopy and EBUS on 1/26/2022, with tissue sampling demonstrating adenocarcinoma on histopathological analysis.     Stage IV NSCLC, Rt lung adenocarcinoma with metastasis to pleura, mediastinum , rt pleural effusion and brain diagnosed 1/2022 (AJCC 8th edition)  PD-L1 TPS 2-3% by [place]   NGS [facility] panel-EML4:ALK rearragement; chr2:[id number], chr2:[id number]  NGS Guardant- GNAS R201H, KRAS K5E- No ALK    Overall prognosis for ALK rearrangement lung cancer is median overall survival > 7 years based on the most recent update of the WINSOME study. He began Alectinib 600 mg BID 3/2/22 and unfortunately developed grade 3 myalgias which have improved with lowering the dose 450 mg BID. He was holding drug 4/2 to 4/11 due to MSSA infection from pleurex which is removed. He will resume it today, He will monitor for return of SE. Discussed reuslts of CT scan on 4/2 which is already showing some response. He will resume back the 450 mg BID dose today.     Plan  Resume alectinib at 450 mg BID, he will start this dose tonight.  RTC with NP/PA in 3 weeks   CT in 2 months  RTC with me after CT  Oral Chemotherapy Pharmacy to continue to assist in toxicity monitoring   Continue to follow with PCP for DM II control and HTN      #MSSA infection, Sepsis  # Pleural effusion: s/p pleurex palcement  2/16/22. Then on 4/2 right PleurX  catheter s/p removal for severe sepsis due to MSSA infection.  Wllfinish cefadroxil for an additional 10 days (will be done in a few days)    #grade 3 myalgias: resolved.  onset ~2 weeks after starting alectinib. Now has improved with holding therapy and Dose reduction as above to 450 mg BID.    #grade 2 fatigue: monitor for less severe fatigue with dose reduction     # Brain mets: Brain MRI with several brain mets, s/p GK to 11-12 brain mets. Now off dexamenthasone  -Will need Brain MRI every 3 months, next MRI 5/2/22    # T2 DM- A1c 10.1.  PTA Lantus of 10 units daily was increased to 20 units bid while in aptient, with much improvement in glucose control.   Encouraged f/u with PCP for management of glucose and insulin    # HTN- previous;y on hydrochlorothiazide, continues to be high today  -will start hydrochlorothiazide, monitor BP at home and will f/u with PCP     #dry eyes  #blurred vision  Continue artificial tears. Saw ophthalmology last week, changed prescription, mow improved.  No neuro sx.     #Psychiatry  # Situational Anxiety   - Feels more irritable lately. Declined referral to therapist and denies medication intervention for now. PCP can help manage this too if he has established relationship with her.     #COVID vaccine: No COVID vaccine on record, confirm at next visit    On the day of service  Chart review: 5 minutes  Visit duration: 30 minutes  Care coordination: 5 minutes    Bassam Persaud MD    Hematology, Oncology and Transplantation

## 2022-04-11 ENCOUNTER — ONCOLOGY VISIT (OUTPATIENT)
Dept: ONCOLOGY | Facility: CLINIC | Age: 42
End: 2022-04-11
Attending: STUDENT IN AN ORGANIZED HEALTH CARE EDUCATION/TRAINING PROGRAM
Payer: MEDICAID

## 2022-04-11 VITALS
SYSTOLIC BLOOD PRESSURE: 169 MMHG | BODY MASS INDEX: 31.13 KG/M2 | WEIGHT: 210.8 LBS | DIASTOLIC BLOOD PRESSURE: 98 MMHG | TEMPERATURE: 98.3 F | HEART RATE: 84 BPM | OXYGEN SATURATION: 98 %

## 2022-04-11 DIAGNOSIS — C34.31 MALIGNANT NEOPLASM OF LOWER LOBE OF RIGHT LUNG (H): ICD-10-CM

## 2022-04-11 DIAGNOSIS — I10 PRIMARY HYPERTENSION: Primary | ICD-10-CM

## 2022-04-11 PROCEDURE — G0463 HOSPITAL OUTPT CLINIC VISIT: HCPCS

## 2022-04-11 PROCEDURE — 99215 OFFICE O/P EST HI 40 MIN: CPT | Performed by: STUDENT IN AN ORGANIZED HEALTH CARE EDUCATION/TRAINING PROGRAM

## 2022-04-11 RX ORDER — HYDROCHLOROTHIAZIDE 25 MG/1
25 TABLET ORAL DAILY
Qty: 30 TABLET | Refills: 3 | Status: SHIPPED | OUTPATIENT
Start: 2022-04-11 | End: 2022-09-12

## 2022-04-11 ASSESSMENT — PAIN SCALES - GENERAL: PAINLEVEL: MILD PAIN (2)

## 2022-04-11 NOTE — NURSING NOTE
"Oncology Rooming Note    April 11, 2022 2:24 PM   Connor Emerson is a 41 year old male who presents for:    Chief Complaint   Patient presents with     Oncology Clinic Visit     Rtn Lung CA     Initial Vitals: BP (!) 169/98   Pulse 84   Temp 98.3  F (36.8  C) (Oral)   Wt 95.6 kg (210 lb 12.8 oz)   SpO2 98%   BMI 31.13 kg/m   Estimated body mass index is 31.13 kg/m  as calculated from the following:    Height as of 4/2/22: 1.753 m (5' 9\").    Weight as of this encounter: 95.6 kg (210 lb 12.8 oz). Body surface area is 2.16 meters squared.  Mild Pain (2) Comment: Data Unavailable   No LMP for male patient.  Allergies reviewed: Yes  Medications reviewed: Yes    Medications: MEDICATION REFILLS NEEDED TODAY. Provider was NOT notified. Oxycodone, Pt still has some.  Pharmacy name entered into EPIC:    [email or web address] [place] - [place], [place] - [id number]  [facility] - [place], [place] - [phone]    Clinical concerns: None       Lanie Amin, EMT on April 11, 2022 at 2:26 PM          "

## 2022-04-11 NOTE — LETTER
4/11/2022         RE: Connor Emerson  [address]  [facility] [address]        Dear Colleague,    Thank you for referring your patient, Connor Emerson, to the [facility]. Please see a copy of my visit note below.    MEDICAL ONCOLOGY FOLLOW UP NOTE    PATIENT NAME: Connor Emerson  ENCOUNTER DATE: 4/11/2022    Care Team  Primary Oncologist: Bassam Persaud MD    REASON FOR CURRENT VISIT: F/u of lung cancer    HISTORY OF PRESENT ILLNESS:  Mr. Connor Emerson is a 41 year old  male who is a non-smoker with PMHx of T2DM, HTN with metastatic NSCLC comes for follow up     Oncologic Hx:    Diagnosis:   Stage IV NSCLC, Rt lung adenocarcinoma with metastasis to pleura, mediastinum , rt pleural effusion and brain diagnosed 1/2022 (AJCC 8th edition)  PD-L1 TPS 2-3% by [place]   NGS [facility] panel-EML4:ALK rearragement  NGS Guardant- GNAS R201H, KRAS K5E- No ALK    Treatment:   Current:  3/2/22- now- Alectinib 600 mg BID (Dose reduced to 450 mg BID due to grade 3 myalgias 3/21/22)  )  Past:  2/15/22- GK to 11 briain lesions    Intent of treatment: Palliative    Oncologic course:  1/19/22 to 1/22/22-Admitted to [facility] for 2 week progressive SOB secondary to have large rt sided pleural effusion, needing thoracentesis x2 (1.7L and 2.0 L removed), cytology positive for malignancy, adenocarcinoma.   1/26/22- Rt pleural mass biopsy-Dr. Agrawal--POSITIVE FOR ADENOCARCINOMA CONSISTENT WITH LUNG PRIMARY, admixed with mesothelial hyperplasia and inflammatory infiltrate (+ TTF-1 and CK 7;  negative  p40, calretinin and WT-1. PAX8 immunostain focal +). 4th thoracentesis done simultaneously - 3L approx removed.   2/1/22- PET/CT-Right lower lobe central infiltrative FDG avid 8.2 x 9.6 cm mass representing a primary lung adenocarcinoma. Ipsilateral right perihilar, bilateral pretracheal, subcarinal and superior mediastinal michele metastases. Contralateral mildly FDG avid few lung nodules are suspicious for  contralateral metastasis. At least 3 intracranial metastases in the right frontal lobe, left frontal lobe and left cerebellar hemisphere. Nonspecific mild diffuse bone marrow uptake. Further evaluation with a spine MRI could be considered to rule out early marrow infiltration. This could also be seen with red marrow conversion.  2/5/22-  Brain MRI- At least 9 intracranial metastases as detailed above. The dominant lesions involving the orbital right frontal lobe, the posterior left middle frontal gyrus, anterior right temporal lobe and in the left cerebellar hemisphere have surrounding moderate vasogenic type edema.  2/15/22- Saw Dr. Arango from Rad Onc- Rcd GK to 12 lesion in bran  2/16/22- Pleurex placement   3/2/22- Started Alectinib 600 mg BID  3/21/22- Dose reduced to 450 mg BID due to grade 3 myalgias   4/2/22 to 4/5/22- Admitted at [facility] for- Severe sepsis due to MSSA infection of right PleurX catheter s/p removal- He presented with onset of pain at tube site starting 4/1; at arrival was tachycardic with leukocytosis (22.7) and elevated lactic acid (2.9).  CT chest showed fluid and stranding tracking outside the pleural space into chest wall along pleural catheter.  IR was consulted and removed catheter 4/2 with report of pustular drainage and tip culture growing MSSA.  Thoracic Surg was consulted who felt no surgical indication necessary given minimal pleural fluid and lack of any signs of abscess.  Initially treated with broad spectrum coverage for sepsis, narrowed to Ancef once sensitivities returned with plan to transition to cefadroxil for an additional 10 days at discharge per ID.  Held drug 4/2 to 4/11     He works as a maintenance manger for apartment complexes      Interval Hx:  Patient is here today alone in the clinic for follow up. He has been holding Alectinib since his hospitalizaton.  He had decreased dose to 450 mg BID which had helped with the myalgias and fatigue.  Breathing is stable,  no cough, no fever or chills.  He has a few more day of antibiotics left.  He had blurring of vision which is now resolved  Vision tested last week, had change in prescription for glasses  Blood sugars are getting much better, checks daily  He continues to work daily, mild fatigue, but able to do all the things, independent of ADL and IADL      REVIEW OF SYSTEMS: 14 point ROS negative other than the symptoms noted above in the HPI.    Wt Readings from Last 4 Encounters:   04/02/22 93 kg (205 lb)   03/21/22 93.9 kg (207 lb)   03/09/22 90.8 kg (200 lb 2.8 oz)   02/24/22 93.9 kg (207 lb)      Review of Systems:  A comprehensive ROS was performed and found to be negative or non-contributory with the exception of that noted in the HPI above.    Past Medical History:  GERD  Hypertension, not on medication  Type 2 diabetes mellitus, not on medications currently, previously on Metformin    Past Surgical History:  Past Surgical History:   Procedure Laterality Date     BRONCHOSCOPY RIGID OR FLEXIBLE W/TRANSENDOSCOPIC ENDOBRONCHIAL ULTRASOUND GUIDED Bilateral 1/26/2022    Procedure: Right BRONCHOSCOPY, FIBEROPTIC, endobronchial ultrasound, pleural biopsy;  Surgeon: Dallin Agrawal MD;  Location: [facility] OR     INJECT BLOCK MEDIAL BRANCH CERVICAL/THORACIC/LUMBAR       INSERT CHEST TUBE Right 2/16/2022    Procedure: INSERTION, CATHETER, INTERCOSTAL, FOR DRAINAGE;  Surgeon: Dallin Agrawal MD;  Location: [facility] GI     INSERT CHEST TUBE Right 3/9/2022    Procedure: INSERTION, CATHETER, INTERCOSTAL, FOR DRAINAGE;  Surgeon: Sushila Antonio MD;  Location: [facility] GI     IR CHEST TUBE REMOVAL TUNNELED RIGHT  4/2/2022     ORTHOPEDIC SURGERY      Ganesh. Rotator cuff repair.     PLEUROSCOPY N/A 1/26/2022    Procedure: Pleuroscopy with Pleural Biopsy;  Surgeon: Dallin Agrawal MD;  Location: [facility] OR       Social History:  Lives with wife and 4 kids in [place]. Works as a  for an apartment complex in [place]. Exposure to  household chemicals and . No significant exposure to asbestos. No signal exposure to benzene or similar chemicals. No significant smoking history-states that he smoked 1 to 2 cigarettes occasionally per month for about 2 years in college, non-smoking since then. No significant alcohol use history. No other recreational substances. Good support system. Kids are 23, 19, 17 and 13.    Family History  Significant history for cancers on maternal side. Mother  of uterine cancer. 2 maternal uncles have possible metastatic melanoma.    Outpatient Medications:  Not currently taking any outpatient medications. Has been prescribed Metformin in the past.    Physical Exam:    Blood pressure (!) 169/98, pulse 84, temperature 98.3  F (36.8  C), temperature source Oral, weight 95.6 kg (210 lb 12.8 oz), SpO2 98 %.     General: alert and cooperative, sitting up in chair  HEENT: sclera anicteric, EOMI, MMM. Pupils equal and reactive.   Neck: supple, normal ROM, no lymph nodes palpable.   CV: RRR, no murmurs  Resp: Right chest Pleurx catheter, RL and RML diminished  GI: soft, non-tender, non-distended, bowel sounds present and normoactive  MSK: warm and well-perfused, normal tone  Skin: no rashes on limited exam, no jaundice  Neuro: Alert awake and oriented x4, strength is 5 on 5 throughout, sensations are grossly intact    Labs & Studies: I personally reviewed the following studies:  Most Recent 3 CBC's:  Recent Labs   Lab Test 22  0841 22  0747 22  1115   WBC 9.0 11.8* 22.7*   HGB 12.2* 11.8* 13.9   MCV 94 92 92    168 229     Most Recent 3 BMP's:  Recent Labs   Lab Test 22  0918 22  0724 22  0155 22  2052 22  0911 22  0841 22  1354 22  0747 22  1909 22  1114   NA  --   --   --   --   --  138  --  137  --  133   POTASSIUM  --   --   --   --   --  3.5  --  3.5  --  3.7   CHLORIDE  --   --   --   --   --  106  --  106  --  103   CO2  --    --   --   --   --  27  --  28  --  25   BUN  --   --   --   --   --  10  --  10  --  9   CR  --  0.62*  --   --   --  0.54*  --  0.54*  --  0.54*   ANIONGAP  --   --   --   --   --  5  --  3  --  5   TORY  --   --   --   --   --  8.4*  --  8.4*  --  8.8   *  --  132* 152*   < > 299*   < > 305*   < > 357*    < > = values in this interval not displayed.    Most Recent 2 LFT's:  Recent Labs   Lab Test 04/03/22  0747 04/02/22  1114   AST 7 12   ALT 12 20   ALKPHOS 84 127   BILITOTAL 0.9 1.2    Most Recent TSH and T4:  Recent Labs   Lab Test 01/19/22 2006   TSH 1.81     I reviewed the above labs today.    ASSESSMENT AND PLAN:  Connor Corrales is a 41-year-old male non-smoker with a past medical history of hypertension and diabetes who presented with several weeks of dyspnea and found to have right-sided pleural effusion and a large right lung hilar mass, status post bronchoscopy and EBUS on 1/26/2022, with tissue sampling demonstrating adenocarcinoma on histopathological analysis.     Stage IV NSCLC, Rt lung adenocarcinoma with metastasis to pleura, mediastinum , rt pleural effusion and brain diagnosed 1/2022 (AJCC 8th edition)  PD-L1 TPS 2-3% by [place]   NGS [facility] panel-EML4:ALK rearragement; chr2:[id number], chr2:[id number]  NGS Guardant- GNAS R201H, KRAS K5E- No ALK    Overall prognosis for ALK rearrangement lung cancer is median overall survival > 7 years based on the most recent update of the WINSOME study. He began Alectinib 600 mg BID 3/2/22 and unfortunately developed grade 3 myalgias which have improved with lowering the dose 450 mg BID. He was holding drug 4/2 to 4/11 due to MSSA infection from pleurex which is removed. He will resume it today, He will monitor for return of SE. Discussed reuslts of CT scan on 4/2 which is already showing some response. He will resume back the 450 mg BID dose today.     Plan  Resume alectinib at 450 mg BID, he will start this dose tonight.  RTC with NP/PA in 3 weeks   CT in 2  months  RTC with me after CT  Oral Chemotherapy Pharmacy to continue to assist in toxicity monitoring   Continue to follow with PCP for DM II control and HTN      #MSSA infection, Sepsis  # Pleural effusion: s/p pleurex palcement  2/16/22. Then on 4/2 right PleurX catheter s/p removal for severe sepsis due to MSSA infection.  Wllfinish cefadroxil for an additional 10 days (will be done in a few days)    #grade 3 myalgias: resolved.  onset ~2 weeks after starting alectinib. Now has improved with holding therapy and Dose reduction as above to 450 mg BID.    #grade 2 fatigue: monitor for less severe fatigue with dose reduction     # Brain mets: Brain MRI with several brain mets, s/p GK to 11-12 brain mets. Now off dexamenthasone  -Will need Brain MRI every 3 months, next MRI 5/2/22    # T2 DM- A1c 10.1.  PTA Lantus of 10 units daily was increased to 20 units bid while in aptient, with much improvement in glucose control.   Encouraged f/u with PCP for management of glucose and insulin    # HTN- previous;y on hydrochlorothiazide, continues to be high today  -will start hydrochlorothiazide, monitor BP at home and will f/u with PCP     #dry eyes  #blurred vision  Continue artificial tears. Saw ophthalmology last week, changed prescription, mow improved.  No neuro sx.     #Psychiatry  # Situational Anxiety   - Feels more irritable lately. Declined referral to therapist and denies medication intervention for now. PCP can help manage this too if he has established relationship with her.     #COVID vaccine: No COVID vaccine on record, confirm at next visit    On the day of service  Chart review: 5 minutes  Visit duration: 30 minutes  Care coordination: 5 minutes          Again, thank you for allowing me to participate in the care of your patient.      Sincerely,    Bassam Persaud MD

## 2022-04-18 ENCOUNTER — LAB (OUTPATIENT)
Dept: LAB | Facility: CLINIC | Age: 42
End: 2022-04-18
Payer: MEDICAID

## 2022-04-18 ENCOUNTER — TELEPHONE (OUTPATIENT)
Dept: ONCOLOGY | Facility: CLINIC | Age: 42
End: 2022-04-18

## 2022-04-18 DIAGNOSIS — Z11.59 NEED FOR HEPATITIS C SCREENING TEST: ICD-10-CM

## 2022-04-18 DIAGNOSIS — Z79.899 ENCOUNTER FOR LONG-TERM (CURRENT) USE OF MEDICATIONS: ICD-10-CM

## 2022-04-18 DIAGNOSIS — Z11.4 SCREENING FOR HIV (HUMAN IMMUNODEFICIENCY VIRUS): ICD-10-CM

## 2022-04-18 DIAGNOSIS — C34.31 MALIGNANT NEOPLASM OF LOWER LOBE OF RIGHT LUNG (H): ICD-10-CM

## 2022-04-18 LAB
BASOPHILS # BLD AUTO: 0.1 10E3/UL (ref 0–0.2)
BASOPHILS NFR BLD AUTO: 1 %
EOSINOPHIL # BLD AUTO: 0.4 10E3/UL (ref 0–0.7)
EOSINOPHIL NFR BLD AUTO: 3 %
ERYTHROCYTE [DISTWIDTH] IN BLOOD BY AUTOMATED COUNT: 14.4 % (ref 10–15)
HCT VFR BLD AUTO: 42.1 % (ref 40–53)
HGB BLD-MCNC: 13.8 G/DL (ref 13.3–17.7)
LYMPHOCYTES # BLD AUTO: 2.5 10E3/UL (ref 0.8–5.3)
LYMPHOCYTES NFR BLD AUTO: 23 %
MCH RBC QN AUTO: 30.5 PG (ref 26.5–33)
MCHC RBC AUTO-ENTMCNC: 32.8 G/DL (ref 31.5–36.5)
MCV RBC AUTO: 93 FL (ref 78–100)
MONOCYTES # BLD AUTO: 0.7 10E3/UL (ref 0–1.3)
MONOCYTES NFR BLD AUTO: 6 %
NEUTROPHILS # BLD AUTO: 7.3 10E3/UL (ref 1.6–8.3)
NEUTROPHILS NFR BLD AUTO: 67 %
PLATELET # BLD AUTO: 263 10E3/UL (ref 150–450)
RBC # BLD AUTO: 4.52 10E6/UL (ref 4.4–5.9)
WBC # BLD AUTO: 10.8 10E3/UL (ref 4–11)

## 2022-04-18 PROCEDURE — 80053 COMPREHEN METABOLIC PANEL: CPT

## 2022-04-18 PROCEDURE — 82550 ASSAY OF CK (CPK): CPT

## 2022-04-18 PROCEDURE — 85025 COMPLETE CBC W/AUTO DIFF WBC: CPT

## 2022-04-18 PROCEDURE — 36415 COLL VENOUS BLD VENIPUNCTURE: CPT

## 2022-04-18 NOTE — ORAL ONC MGMT
"Oral Chemotherapy Monitoring Program    Subjective/Objective:  Connor Emerson is a 41 year old male contacted by phone for a follow-up visit for oral chemotherapy.  Connor resumed alectinib on 4/11/22, since then he reports that he is doing ok but back to feeling like he is in \"slow motion\" which he has felt since day 1 on this drug. He is doing ok, but feels almost instantly better whenever the drug is held. Connor did see an eye doctor recently and feels that the vision issues he was having are mostly resolved.    ORAL CHEMOTHERAPY 2/23/2022 3/9/2022 3/16/2022 3/18/2022 3/28/2022 4/1/2022 4/18/2022   Assessment Type New Teach Initial Follow up Other Chart Review Initial Follow up;Other Left Voicemail Lab Monitoring   Diagnosis Code Non-Small Cell Lung Cancer Non-Small Cell Lung Cancer Non-Small Cell Lung Cancer Non-Small Cell Lung Cancer Non-Small Cell Lung Cancer Non-Small Cell Lung Cancer Non-Small Cell Lung Cancer   Providers Dr. Cori Persaud   [facility]   Drug Name Alecensa (alectinib) Alecensa (alectinib) Alecensa (alectinib) Alecensa (alectinib) Alecensa (alectinib) Alecensa (alectinib) Alecensa (alectinib)   Dose 600 mg 600 mg 600 mg - 450 mg 450 mg 450 mg   Current Schedule BID BID BID - BID BID BID   Cycle Details Continuous Continuous Continuous Drug on Hold - - Continuous   Start Date of Last Cycle - 3/4/2022 - - 3/21/2022 - 4/11/2022   Doses missed in last 2 weeks - - - - 0 - 0   Adherence Assessment - Adherent - - Adherent - Adherent   Adverse Effects - Nausea;Fatigue - - Other (See Note for Details) - Fatigue   Other (See Note for Details) - - - - Eye disturbance  - -   Pharmacist intervention(other) - - - - Yes - -   Intervention(s) - - - - Referral to oncology provider - -   Any new drug interactions? - No - - - - -   Is the dose as ordered appropriate for the " "patient? - - - - - - -   Is the patient currently in pain? - - Yes - - - -   Does the patient feel the pain is currently being managed by a provider? - - No - - - -       Last PHQ-2 Score on record:   PHQ-2 ( 1999 Pfizer) 2/24/2022 1/25/2022   Q1: Little interest or pleasure in doing things 0 0   Q2: Feeling down, depressed or hopeless 0 0   PHQ-2 Score 0 0   PHQ-2 Total Score (12-17 Years)- Positive if 3 or more points; Administer PHQ-A if positive - -   Q1: Little interest or pleasure in doing things Not at all -   Q2: Feeling down, depressed or hopeless Not at all -   PHQ-2 Score 0 -       Vitals:  BP:   BP Readings from Last 1 Encounters:   04/11/22 (!) 169/98     Wt Readings from Last 1 Encounters:   04/11/22 95.6 kg (210 lb 12.8 oz)     Estimated body surface area is 2.16 meters squared as calculated from the following:    Height as of 4/2/22: 1.753 m (5' 9\").    Weight as of 4/11/22: 95.6 kg (210 lb 12.8 oz).    Labs:  _  Result Component Current Result Ref Range   Sodium 138 (4/4/2022) 133 - 144 mmol/L     _  Result Component Current Result Ref Range   Potassium 3.5 (4/4/2022) 3.4 - 5.3 mmol/L     _  Result Component Current Result Ref Range   Calcium 8.4 (L) (4/4/2022) 8.5 - 10.1 mg/dL     No results found for Mag within last 30 days.     No results found for Phos within last 30 days.     _  Result Component Current Result Ref Range   Albumin 2.6 (L) (4/3/2022) 3.4 - 5.0 g/dL     _  Result Component Current Result Ref Range   Urea Nitrogen 10 (4/4/2022) 7 - 30 mg/dL     _  Result Component Current Result Ref Range   Creatinine 0.62 (L) (4/5/2022) 0.66 - 1.25 mg/dL     _  Result Component Current Result Ref Range   AST 7 (4/3/2022) 0 - 45 U/L     _  Result Component Current Result Ref Range   ALT 12 (4/3/2022) 0 - 70 U/L     _  Result Component Current Result Ref Range   Bilirubin Total 0.9 (4/3/2022) 0.2 - 1.3 mg/dL     _  Result Component Current Result Ref Range   WBC Count 10.8 (4/18/2022) 4.0 - 11.0 " "10e3/uL     _  Result Component Current Result Ref Range   Hemoglobin 13.8 (4/18/2022) 13.3 - 17.7 g/dL     _  Result Component Current Result Ref Range   Platelet Count 263 (4/18/2022) 150 - 450 10e3/uL     No results found for ANC within last 30 days.     _  Result Component Current Result Ref Range   Absolute Neutrophils 7.3 (4/18/2022) 1.6 - 8.3 10e3/uL          Assessment/Plan:  Connor overall is doing well, he confirms taking the correct dose of 450mg BID and has not missed any doses since resuming therapy on 4/11/22. He has started taking hydrochlorothiazide for hypertension. A blood pressure was not taken at today's lab appointment and he has not measured his blood pressure at home for about a week. Connor does have a follow-up appointment with his PCP scheduled for May 6th, I recommended monitoring his blood pressure so he could bring some numbers to that appointment in case any dose adjustments needed to be made. We did discuss what \"normal\" blood pressures might look like and when it would be important to call in regards to more critical readings. At the time of our call, the CMP from today was still in process but the CBC looked great. Connor will call if any side effects or hypertension were to worsen, otherwise the next labs are scheduled for 5/2 and a visit with Dr Persaud on 5/4.    Follow-Up:  Connor will monitor blood pressure at home    Vanessa Chua, PharmD, BCPS  Oral Chemotherapy Monitoring Program  [facility]  [phone]    "

## 2022-04-19 LAB
ALBUMIN SERPL-MCNC: 3.7 G/DL (ref 3.4–5)
ALP SERPL-CCNC: 179 U/L (ref 40–150)
ALT SERPL W P-5'-P-CCNC: 19 U/L (ref 0–70)
ANION GAP SERPL CALCULATED.3IONS-SCNC: 5 MMOL/L (ref 3–14)
AST SERPL W P-5'-P-CCNC: 9 U/L (ref 0–45)
BILIRUB SERPL-MCNC: 0.6 MG/DL (ref 0.2–1.3)
BUN SERPL-MCNC: 16 MG/DL (ref 7–30)
CALCIUM SERPL-MCNC: 9.8 MG/DL (ref 8.5–10.1)
CHLORIDE BLD-SCNC: 101 MMOL/L (ref 94–109)
CK SERPL-CCNC: 24 U/L (ref 30–300)
CO2 SERPL-SCNC: 31 MMOL/L (ref 20–32)
CREAT SERPL-MCNC: 0.61 MG/DL (ref 0.66–1.25)
GFR SERPL CREATININE-BSD FRML MDRD: >90 ML/MIN/1.73M2
GLUCOSE BLD-MCNC: 398 MG/DL (ref 70–99)
POTASSIUM BLD-SCNC: 4.1 MMOL/L (ref 3.4–5.3)
PROT SERPL-MCNC: 7 G/DL (ref 6.8–8.8)
SODIUM SERPL-SCNC: 137 MMOL/L (ref 133–144)

## 2022-04-20 ENCOUNTER — TELEPHONE (OUTPATIENT)
Dept: PULMONOLOGY | Facility: CLINIC | Age: 42
End: 2022-04-20
Payer: MEDICAID

## 2022-04-20 NOTE — TELEPHONE ENCOUNTER
Called patient to schedule procedure with Dr. Dallin Agrawal, there was no answer.  Left message with my direct line [phone].

## 2022-04-28 ENCOUNTER — MYC REFILL (OUTPATIENT)
Dept: ONCOLOGY | Facility: CLINIC | Age: 42
End: 2022-04-28
Payer: MEDICAID

## 2022-04-28 DIAGNOSIS — G89.3 CANCER ASSOCIATED PAIN: ICD-10-CM

## 2022-04-29 RX ORDER — OXYCODONE HYDROCHLORIDE 5 MG/1
5 TABLET ORAL EVERY 6 HOURS PRN
Qty: 30 TABLET | Refills: 0 | Status: SHIPPED | OUTPATIENT
Start: 2022-04-29 | End: 2022-05-10

## 2022-04-29 NOTE — TELEPHONE ENCOUNTER
[facility] Triage    Refill Request    Date of most recent appointment:  4/11/22 Cori  Next upcoming appointment:  5/4/22 Cori    Medication requested:  Oxy 5 mg  Quantity:  30  Last fill date:  3/18/22  Person requesting refill:  Connor  Notes:  Number left     0            Side Effects  None  Take before bed    Prescribing provider(s):  Cori    Paged chelsea Villegas at [phone] as patient is out of medication  Routing to Chelsea Villegas as Cori is out

## 2022-04-30 ENCOUNTER — HEALTH MAINTENANCE LETTER (OUTPATIENT)
Age: 42
End: 2022-04-30

## 2022-05-01 ENCOUNTER — TRANSFERRED RECORDS (OUTPATIENT)
Dept: HEALTH INFORMATION MANAGEMENT | Facility: CLINIC | Age: 42
End: 2022-05-01

## 2022-05-01 LAB — RETINOPATHY: NORMAL

## 2022-05-02 ENCOUNTER — OFFICE VISIT (OUTPATIENT)
Dept: NEUROSURGERY | Facility: CLINIC | Age: 42
End: 2022-05-02
Attending: NEUROLOGICAL SURGERY
Payer: MEDICAID

## 2022-05-02 ENCOUNTER — HOSPITAL ENCOUNTER (OUTPATIENT)
Dept: CT IMAGING | Facility: CLINIC | Age: 42
Discharge: HOME | End: 2022-05-02
Attending: STUDENT IN AN ORGANIZED HEALTH CARE EDUCATION/TRAINING PROGRAM | Admitting: STUDENT IN AN ORGANIZED HEALTH CARE EDUCATION/TRAINING PROGRAM
Payer: MEDICAID

## 2022-05-02 ENCOUNTER — LAB (OUTPATIENT)
Dept: LAB | Facility: CLINIC | Age: 42
End: 2022-05-02
Payer: MEDICAID

## 2022-05-02 ENCOUNTER — HOSPITAL ENCOUNTER (OUTPATIENT)
Dept: MRI IMAGING | Facility: CLINIC | Age: 42
Discharge: HOME | End: 2022-05-02
Attending: NEUROLOGICAL SURGERY | Admitting: NEUROLOGICAL SURGERY
Payer: MEDICAID

## 2022-05-02 VITALS — DIASTOLIC BLOOD PRESSURE: 87 MMHG | SYSTOLIC BLOOD PRESSURE: 135 MMHG | OXYGEN SATURATION: 96 % | HEART RATE: 78 BPM

## 2022-05-02 DIAGNOSIS — C34.31 MALIGNANT NEOPLASM OF LOWER LOBE OF RIGHT LUNG (H): ICD-10-CM

## 2022-05-02 DIAGNOSIS — G89.3 CANCER ASSOCIATED PAIN: ICD-10-CM

## 2022-05-02 DIAGNOSIS — C79.31 METASTASIS TO BRAIN (H): ICD-10-CM

## 2022-05-02 DIAGNOSIS — C79.31 BRAIN METASTASIS: Primary | ICD-10-CM

## 2022-05-02 DIAGNOSIS — Z79.899 ENCOUNTER FOR LONG-TERM (CURRENT) USE OF MEDICATIONS: ICD-10-CM

## 2022-05-02 LAB
BASOPHILS # BLD AUTO: 0 10E3/UL (ref 0–0.2)
BASOPHILS NFR BLD AUTO: 1 %
EOSINOPHIL # BLD AUTO: 0.4 10E3/UL (ref 0–0.7)
EOSINOPHIL NFR BLD AUTO: 5 %
ERYTHROCYTE [DISTWIDTH] IN BLOOD BY AUTOMATED COUNT: 14.6 % (ref 10–15)
HCT VFR BLD AUTO: 41.3 % (ref 40–53)
HGB BLD-MCNC: 13.9 G/DL (ref 13.3–17.7)
LYMPHOCYTES # BLD AUTO: 2.8 10E3/UL (ref 0.8–5.3)
LYMPHOCYTES NFR BLD AUTO: 33 %
MCH RBC QN AUTO: 30.9 PG (ref 26.5–33)
MCHC RBC AUTO-ENTMCNC: 33.7 G/DL (ref 31.5–36.5)
MCV RBC AUTO: 92 FL (ref 78–100)
MONOCYTES # BLD AUTO: 0.8 10E3/UL (ref 0–1.3)
MONOCYTES NFR BLD AUTO: 9 %
NEUTROPHILS # BLD AUTO: 4.5 10E3/UL (ref 1.6–8.3)
NEUTROPHILS NFR BLD AUTO: 52 %
PLATELET # BLD AUTO: 220 10E3/UL (ref 150–450)
RBC # BLD AUTO: 4.5 10E6/UL (ref 4.4–5.9)
WBC # BLD AUTO: 8.5 10E3/UL (ref 4–11)

## 2022-05-02 PROCEDURE — 250N000011 HC RX IP 250 OP 636: Performed by: STUDENT IN AN ORGANIZED HEALTH CARE EDUCATION/TRAINING PROGRAM

## 2022-05-02 PROCEDURE — 80053 COMPREHEN METABOLIC PANEL: CPT

## 2022-05-02 PROCEDURE — 85025 COMPLETE CBC W/AUTO DIFF WBC: CPT

## 2022-05-02 PROCEDURE — A9585 GADOBUTROL INJECTION: HCPCS | Performed by: NEUROLOGICAL SURGERY

## 2022-05-02 PROCEDURE — 74177 CT ABD & PELVIS W/CONTRAST: CPT

## 2022-05-02 PROCEDURE — 36415 COLL VENOUS BLD VENIPUNCTURE: CPT

## 2022-05-02 PROCEDURE — 255N000002 HC RX 255 OP 636: Performed by: NEUROLOGICAL SURGERY

## 2022-05-02 PROCEDURE — 250N000009 HC RX 250: Performed by: STUDENT IN AN ORGANIZED HEALTH CARE EDUCATION/TRAINING PROGRAM

## 2022-05-02 PROCEDURE — 99207 PR NO CHARGE LOS: CPT | Performed by: NEUROLOGICAL SURGERY

## 2022-05-02 PROCEDURE — 70553 MRI BRAIN STEM W/O & W/DYE: CPT

## 2022-05-02 PROCEDURE — G0463 HOSPITAL OUTPT CLINIC VISIT: HCPCS

## 2022-05-02 PROCEDURE — 82550 ASSAY OF CK (CPK): CPT

## 2022-05-02 RX ORDER — IOPAMIDOL 755 MG/ML
500 INJECTION, SOLUTION INTRAVASCULAR ONCE
Status: COMPLETED | OUTPATIENT
Start: 2022-05-02 | End: 2022-05-02

## 2022-05-02 RX ORDER — GADOBUTROL 604.72 MG/ML
10 INJECTION INTRAVENOUS ONCE
Status: COMPLETED | OUTPATIENT
Start: 2022-05-02 | End: 2022-05-02

## 2022-05-02 RX ADMIN — SODIUM CHLORIDE 65 ML: 9 INJECTION, SOLUTION INTRAVENOUS at 07:40

## 2022-05-02 RX ADMIN — IOPAMIDOL 100 ML: 755 INJECTION, SOLUTION INTRAVENOUS at 07:40

## 2022-05-02 RX ADMIN — GADOBUTROL 10 ML: 604.72 INJECTION INTRAVENOUS at 07:39

## 2022-05-02 NOTE — NURSING NOTE
"Connor Emerson is a 41 year old male who presents for:  Chief Complaint   Patient presents with     RECHECK     Gamma knife 3 mo follow-up  Review CT        Vitals:    Vitals:    05/02/22 0916   BP: 135/87   Pulse: 78   SpO2: 96%       BMI:  Estimated body mass index is 31.13 kg/m  as calculated from the following:    Height as of 4/2/22: 5' 9\" (1.753 m).    Weight as of 4/11/22: 210 lb 12.8 oz (95.6 kg).    Pain Score:  Data Unavailable        Amendo Phorn      "

## 2022-05-02 NOTE — LETTER
"    5/2/2022         RE: Connor Emerson  [address]  [facility] [address]        Dear Colleague,    Thank you for referring your patient, Connor Emerson, to the [facility] [place]. Please see a copy of my visit note below.    It was a pleasure to see Connor Emerson today in Neurosurgery Clinic. He is a 41 year old male who underwent radiosurgery to multiple brain metastasis in February 2022.  He is here for routine follow-up.    He describes some changes in his vision and is seeing the ophthalmologist for this and sounds like he has a worsening prescription but no other obvious neurologic cause for the problem.  Otherwise he is doing well.    Vitals:    05/02/22 0916   BP: 135/87   Pulse: 78   SpO2: 96%     Estimated body mass index is 31.13 kg/m  as calculated from the following:    Height as of 4/2/22: 1.753 m (5' 9\").    Weight as of 4/11/22: 95.6 kg (210 lb 12.8 oz).  Data Unavailable    Awake alert and oriented.  Neurologically intact.    Imaging: MRI of the brain today shows positive response to treatment with radiosurgery of his brain metastasis.  The larger cystic metastases have not changed much in side but clearly have not grown.  The imaging was reviewed with the patient shown to the patient in clinic today.    Assessment: Status post radiosurgery for brain metastasis, stable.    Plan: I would like to see him back in 3 months with a repeat MRI of the brain.         Again, thank you for allowing me to participate in the care of your patient.        Sincerely,        Stephen Moran MD    "

## 2022-05-03 LAB
ALBUMIN SERPL-MCNC: 3.9 G/DL (ref 3.4–5)
ALP SERPL-CCNC: 163 U/L (ref 40–150)
ALT SERPL W P-5'-P-CCNC: 29 U/L (ref 0–70)
ANION GAP SERPL CALCULATED.3IONS-SCNC: 9 MMOL/L (ref 3–14)
AST SERPL W P-5'-P-CCNC: 16 U/L (ref 0–45)
BILIRUB SERPL-MCNC: 0.9 MG/DL (ref 0.2–1.3)
BUN SERPL-MCNC: 13 MG/DL (ref 7–30)
CALCIUM SERPL-MCNC: 9.1 MG/DL (ref 8.5–10.1)
CHLORIDE BLD-SCNC: 98 MMOL/L (ref 94–109)
CK SERPL-CCNC: 38 U/L (ref 30–300)
CO2 SERPL-SCNC: 29 MMOL/L (ref 20–32)
CREAT SERPL-MCNC: 0.64 MG/DL (ref 0.66–1.25)
GFR SERPL CREATININE-BSD FRML MDRD: >90 ML/MIN/1.73M2
GLUCOSE BLD-MCNC: 385 MG/DL (ref 70–99)
POTASSIUM BLD-SCNC: 3.8 MMOL/L (ref 3.4–5.3)
PROT SERPL-MCNC: 7.2 G/DL (ref 6.8–8.8)
SODIUM SERPL-SCNC: 136 MMOL/L (ref 133–144)

## 2022-05-04 ENCOUNTER — ONCOLOGY VISIT (OUTPATIENT)
Dept: ONCOLOGY | Facility: CLINIC | Age: 42
End: 2022-05-04
Attending: STUDENT IN AN ORGANIZED HEALTH CARE EDUCATION/TRAINING PROGRAM
Payer: MEDICAID

## 2022-05-04 VITALS
WEIGHT: 216 LBS | OXYGEN SATURATION: 97 % | TEMPERATURE: 98.2 F | SYSTOLIC BLOOD PRESSURE: 144 MMHG | DIASTOLIC BLOOD PRESSURE: 96 MMHG | HEART RATE: 95 BPM | BODY MASS INDEX: 31.9 KG/M2

## 2022-05-04 DIAGNOSIS — C34.31 MALIGNANT NEOPLASM OF LOWER LOBE OF RIGHT LUNG (H): Primary | ICD-10-CM

## 2022-05-04 DIAGNOSIS — C34.31 MALIGNANT NEOPLASM OF LOWER LOBE OF RIGHT LUNG (H): ICD-10-CM

## 2022-05-04 DIAGNOSIS — G89.3 CANCER ASSOCIATED PAIN: ICD-10-CM

## 2022-05-04 PROCEDURE — G0463 HOSPITAL OUTPT CLINIC VISIT: HCPCS

## 2022-05-04 PROCEDURE — 99215 OFFICE O/P EST HI 40 MIN: CPT | Performed by: STUDENT IN AN ORGANIZED HEALTH CARE EDUCATION/TRAINING PROGRAM

## 2022-05-04 ASSESSMENT — PAIN SCALES - GENERAL: PAINLEVEL: EXTREME PAIN (8)

## 2022-05-04 NOTE — PROGRESS NOTES
MEDICAL ONCOLOGY FOLLOW UP NOTE    PATIENT NAME: Connor Emerson  ENCOUNTER DATE: 5/4/2022    Care Team  Primary Oncologist: Bassam Persaud MD    REASON FOR CURRENT VISIT: F/u of lung cancer    HISTORY OF PRESENT ILLNESS:  Mr. Connor Emerson is a 41 year old  male who is a non-smoker with PMHx of T2DM, HTN with metastatic NSCLC comes for follow up     Oncologic Hx:    Diagnosis:   Stage IV NSCLC, Rt lung adenocarcinoma with metastasis to pleura, mediastinum , rt pleural effusion and brain diagnosed 1/2022 (AJCC 8th edition)  PD-L1 TPS 2-3% by [place]   NGS [facility] panel-EML4:ALK rearragement  NGS Guardant- GNAS R201H, KRAS K5E- No ALK    Treatment:   Current:  3/2/22- now- Alectinib 600 mg BID (Dose reduced to 450 mg BID due to grade 3 myalgias 3/21/22)  )  Past:  2/15/22- GK to 11 briain lesions    Intent of treatment: Palliative    Oncologic course:  1/19/22 to 1/22/22-Admitted to [facility] for 2 week progressive SOB secondary to have large rt sided pleural effusion, needing thoracentesis x2 (1.7L and 2.0 L removed), cytology positive for malignancy, adenocarcinoma.   1/26/22- Rt pleural mass biopsy-Dr. Agrawal--POSITIVE FOR ADENOCARCINOMA CONSISTENT WITH LUNG PRIMARY, admixed with mesothelial hyperplasia and inflammatory infiltrate (+ TTF-1 and CK 7;  negative  p40, calretinin and WT-1. PAX8 immunostain focal +). 4th thoracentesis done simultaneously - 3L approx removed.   2/1/22- PET/CT-Right lower lobe central infiltrative FDG avid 8.2 x 9.6 cm mass representing a primary lung adenocarcinoma. Ipsilateral right perihilar, bilateral pretracheal, subcarinal and superior mediastinal michele metastases. Contralateral mildly FDG avid few lung nodules are suspicious for contralateral metastasis. At least 3 intracranial metastases in the right frontal lobe, left frontal lobe and left cerebellar hemisphere. Nonspecific mild diffuse bone marrow uptake. Further evaluation with a spine MRI could be considered to rule out early marrow  infiltration. This could also be seen with red marrow conversion.  2/5/22-  Brain MRI- At least 9 intracranial metastases as detailed above. The dominant lesions involving the orbital right frontal lobe, the posterior left middle frontal gyrus, anterior right temporal lobe and in the left cerebellar hemisphere have surrounding moderate vasogenic type edema.  2/15/22- Saw Dr. Arango from [facility] Onc- Rcd GK to 12 lesion in bran  2/16/22- Pleurex placement   3/2/22- Started Alectinib 600 mg BID  3/21/22- Dose reduced to 450 mg BID due to grade 3 myalgias and fatigue  4/2/22 to 4/5/22- Admitted at [facility] for- Severe sepsis due to MSSA infection of right PleurX catheter s/p removal- He presented with onset of pain at tube site starting 4/1; at arrival was tachycardic with leukocytosis (22.7) and elevated lactic acid (2.9).  CT chest showed fluid and stranding tracking outside the pleural space into chest wall along pleural catheter.  IR was consulted and removed catheter 4/2 with report of pustular drainage and tip culture growing MSSA.  Thoracic Surg was consulted who felt no surgical indication necessary given minimal pleural fluid and lack of any signs of abscess.  Initially treated with broad spectrum coverage for sepsis, narrowed to Ancef once sensitivities returned with plan to transition to cefadroxil for an additional 10 days at discharge per ID. Held drug 4/2 to 4/11 5/2/22- CT CAP- Overall, positive response to therapy with decreased size of right lower lobe and right pleural-based masses, pulmonary metastases, hilar and mediastinal lymphadenopathy. However, a single right posterior pleural-based mass has slightly increased in size since 2/24/2022. No metastatic disease in the abdomen and pelvis. Right Pleurx catheter has been removed. Trace right pleural effusion and right basilar atelectasis.  5/2/22- Brain MRI- The previously demonstrated brain metastases are mildly diminished in size versus to 2/5/2022.  The degree of edema is also diminished but not completely resolved. Probable trace amounts of intralesional bleeding demonstrated on the gradient sequence within the metastases. No definite new metastasis or progressive mass effect. No hydrocephalus or infarct.     He works as a maintenance manger for apartment complexes      Interval Hx:  Patient is here today alone in the clinic for follow up.  Continues on 450 mg BID of Alectinib.  Since the last visit he has noticed bilateral lateral hip pain, constant, requiring 3-4 oxycodone/day and keeping him up from sleep  Has mild aches and pains in muscle  Has ongoing fatigue, but able to push himself to work daily  Breathing is stable, no cough, no fever or chills.  Blood sugars are still high, but getting slightly better, follows PCP  He continues to work daily,  independent of ADL and IADL      REVIEW OF SYSTEMS: 14 point ROS negative other than the symptoms noted above in the HPI.    Wt Readings from Last 4 Encounters:   04/11/22 95.6 kg (210 lb 12.8 oz)   04/02/22 93 kg (205 lb)   03/21/22 93.9 kg (207 lb)   03/09/22 90.8 kg (200 lb 2.8 oz)      Review of Systems:  A comprehensive ROS was performed and found to be negative or non-contributory with the exception of that noted in the HPI above.    Past Medical History:  GERD  Hypertension, not on medication  Type 2 diabetes mellitus, not on medications currently, previously on Metformin    Past Surgical History:  Past Surgical History:   Procedure Laterality Date     BRONCHOSCOPY RIGID OR FLEXIBLE W/TRANSENDOSCOPIC ENDOBRONCHIAL ULTRASOUND GUIDED Bilateral 1/26/2022    Procedure: Right BRONCHOSCOPY, FIBEROPTIC, endobronchial ultrasound, pleural biopsy;  Surgeon: Dallin Agrawal MD;  [facility]     INJECT BLOCK MEDIAL BRANCH CERVICAL/THORACIC/LUMBAR       INSERT CHEST TUBE Right 2/16/2022    Procedure: INSERTION, CATHETER, INTERCOSTAL, FOR DRAINAGE;  Surgeon: Dallin Agrawal MD;  Location: [facility] GI     INSERT CHEST  TUBE Right 3/9/2022    Procedure: INSERTION, CATHETER, INTERCOSTAL, FOR DRAINAGE;  Surgeon: Sushila Antonio MD;  [facility]     IR CHEST TUBE REMOVAL TUNNELED RIGHT  2022     ORTHOPEDIC SURGERY      Ganesh. Rotator cuff repair.     PLEUROSCOPY N/A 2022    Procedure: Pleuroscopy with Pleural Biopsy;  Surgeon: Dallin Agrawal MD;  Location:  OR       Social History:  Lives with wife and 4 kids in [place]. Works as a  for an [employer] complex in [place]. Exposure to household chemicals and . No significant exposure to asbestos. No signal exposure to benzene or similar chemicals. No significant smoking history-states that he smoked 1 to 2 cigarettes occasionally per month for about 2 years in college, non-smoking since then. No significant alcohol use history. No other recreational substances. Good support system. Kids are 23, 19, 17 and 13.    Family History  Significant history for cancers on maternal side. Mother  of uterine cancer. 2 maternal uncles have possible metastatic melanoma.    Outpatient Medications:  Not currently taking any outpatient medications. Has been prescribed Metformin in the past.    Physical Exam:    Blood pressure (!) 144/96, pulse 95, temperature 98.2  F (36.8  C), temperature source Oral, weight 98 kg (216 lb), SpO2 97 %.     General: alert and cooperative, sitting up in chair  HEENT: sclera anicteric, EOMI, MMM. Pupils equal and reactive.   Neck: supple, normal ROM, no lymph nodes palpable.   CV: RRR, no murmurs  Resp: Right chest Pleurx catheter, RL and RML diminished  GI: soft, non-tender, non-distended, bowel sounds present and normoactive  MSK: warm and well-perfused, normal tone  Skin: no rashes on limited exam, no jaundice  Neuro: Alert awake and oriented x4, strength is 5 on 5 throughout, sensations are grossly intact    Labs & Studies: I personally reviewed the following studies:  Most Recent 3 CBC's:  Recent Labs   Lab Test  05/02/22  1006 04/18/22  1331 04/04/22  0841   WBC 8.5 10.8 9.0   HGB 13.9 13.8 12.2*   MCV 92 93 94    263 171     Most Recent 3 BMP's:  Recent Labs   Lab Test 05/02/22  1006 04/18/22  1331 04/05/22  0918 04/05/22  0724 04/04/22  0911 04/04/22  0841    137  --   --   --  138   POTASSIUM 3.8 4.1  --   --   --  3.5   CHLORIDE 98 101  --   --   --  106   CO2 29 31  --   --   --  27   BUN 13 16  --   --   --  10   CR 0.64* 0.61*  --  0.62*  --  0.54*   ANIONGAP 9 5  --   --   --  5   TORY 9.1 9.8  --   --   --  8.4*   * 398* 166*  --    < > 299*    < > = values in this interval not displayed.    Most Recent 2 LFT's:  Recent Labs   Lab Test 05/02/22  1006 04/18/22  1331   AST 16 9   ALT 29 19   ALKPHOS 163* 179*   BILITOTAL 0.9 0.6    Most Recent TSH and T4:  Recent Labs   Lab Test 01/19/22 2006   TSH 1.81     I reviewed the above labs today.    ASSESSMENT AND PLAN:  Connor Corrales is a 41-year-old male non-smoker with a past medical history of hypertension and diabetes who presented with several weeks of dyspnea and found to have right-sided pleural effusion and a large right lung hilar mass, status post bronchoscopy and EBUS on 1/26/2022, with tissue sampling demonstrating adenocarcinoma on histopathological analysis.     Stage IV NSCLC, Rt lung adenocarcinoma with metastasis to pleura, mediastinum , rt pleural effusion and brain diagnosed 1/2022 (AJCC 8th edition)  PD-L1 TPS 2-3% by [place]   NGS [facility] panel-EML4:ALK rearragement; chr2:[id number], chr2:[id number]  NGS [facility]- GNAS R201H, KRAS K5E- No ALK    Overall prognosis for ALK rearrangement lung cancer is median overall survival > 7 years based on the most recent update of the WINSOME study. He began Alectinib 600 mg BID 3/2/22 and unfortunately developed grade 3 myalgias which have improved with lowering the dose 450 mg BID. He was holding drug 4/2 to 4/11 due to MSSA infection from pleurex which is removed. Resumed at 450 mg BID, doing well,  CT with good OK. Brain MRI is stable.     Plan  Continue alectinib at 450 mg BID  Oral Chemotherapy Pharmacy to continue to assist in toxicity monitoring    # Bilateral lateral hip pain- unclear cause, no cancer lesion in the PET/CT scan in the area.  --use oxycodone prn  -will get pelvic MRI ASAP     #grade 3 myalgias: resolved.  onset ~2 weeks after starting alectinib. Now has improved with holding therapy and Dose reduction as above to 450 mg BID.    #grade 2 fatigue: monitor for now     #MSSA infection, Sepsis at pleurex site- resolved  # Pleural effusion: s/p pleurex palcement  2/16/22. Then on 4/2 right PleurX catheter s/p removal for severe sepsis due to MSSA infection.      # Brain mets: Brain MRI with several brain mets, s/p GK to 11-12 brain mets. Recent MRI 5/2 showed stable disease.  -Will need Brain MRI every 3 months, next MRI 8/2/22    # T2 DM- A1c 10.1.  PTA Lantus 20 units bid while in aptient, with much improvement in glucose control. Of note pt does not have any meal time insulin, he has f/up appt with PCP soon.  Encouraged f/u with PCP for management of glucose and insulin    # HTN- restarted hydrochlorothiazide, will continue , bP today 144/96  -onitor BP at home and will f/u with PCP     #dry eyes  #blurred vision  Continue artificial tears. Saw ophthalmology previosuly, changed prescription, mow improved.  No neuro sx.     #Psychiatry  # Situational Anxiety   - Feels more irritable lately. Declined referral to therapist and denies medication intervention for now. PCP can help manage this too if he has established relationship with her.     #COVID vaccine: No COVID vaccine on record, confirm at next visit    Overall plan  MRI hips ASAP   RTC with NP/PA in 1 month   CT CAP in 2 months   RTC with me after CT    On the day of service  Chart review: 5 minutes  Visit duration: 35 minutes  Care coordination: 5 minutes    Bassam Persaud MD    Hematology, Oncology and  Transplantation

## 2022-05-04 NOTE — LETTER
5/4/2022         RE: Connor Emerson  [address]  [place] [address]        Dear Colleague,    Thank you for referring your patient, Connor Emerson, to the [facility]. Please see a copy of my visit note below.    MEDICAL ONCOLOGY FOLLOW UP NOTE    PATIENT NAME: Connor Emerson  ENCOUNTER DATE: 5/4/2022    Care Team  Primary Oncologist: Bassam Persaud MD    REASON FOR CURRENT VISIT: F/u of lung cancer    HISTORY OF PRESENT ILLNESS:  Mr. Connor Emerson is a 41 year old  male who is a non-smoker with PMHx of T2DM, HTN with metastatic NSCLC comes for follow up     Oncologic Hx:    Diagnosis:   Stage IV NSCLC, Rt lung adenocarcinoma with metastasis to pleura, mediastinum , rt pleural effusion and brain diagnosed 1/2022 (AJCC 8th edition)  PD-L1 TPS 2-3% by [place]   NGS [facility] panel-EML4:ALK rearragement  NGS Guardant- GNAS R201H, KRAS K5E- No ALK    Treatment:   Current:  3/2/22- now- Alectinib 600 mg BID (Dose reduced to 450 mg BID due to grade 3 myalgias 3/21/22)  )  Past:  2/15/22- GK to 11 briain lesions    Intent of treatment: Palliative    Oncologic course:  1/19/22 to 1/22/22-Admitted to [facility] for 2 week progressive SOB secondary to have large rt sided pleural effusion, needing thoracentesis x2 (1.7L and 2.0 L removed), cytology positive for malignancy, adenocarcinoma.   1/26/22- Rt pleural mass biopsy-Dr. Agrawal--POSITIVE FOR ADENOCARCINOMA CONSISTENT WITH LUNG PRIMARY, admixed with mesothelial hyperplasia and inflammatory infiltrate (+ TTF-1 and CK 7;  negative  p40, calretinin and WT-1. PAX8 immunostain focal +). 4th thoracentesis done simultaneously - 3L approx removed.   2/1/22- PET/CT-Right lower lobe central infiltrative FDG avid 8.2 x 9.6 cm mass representing a primary lung adenocarcinoma. Ipsilateral right perihilar, bilateral pretracheal, subcarinal and superior mediastinal michele metastases. Contralateral mildly FDG avid few lung nodules are suspicious for  contralateral metastasis. At least 3 intracranial metastases in the right frontal lobe, left frontal lobe and left cerebellar hemisphere. Nonspecific mild diffuse bone marrow uptake. Further evaluation with a spine MRI could be considered to rule out early marrow infiltration. This could also be seen with red marrow conversion.  2/5/22-  Brain MRI- At least 9 intracranial metastases as detailed above. The dominant lesions involving the orbital right frontal lobe, the posterior left middle frontal gyrus, anterior right temporal lobe and in the left cerebellar hemisphere have surrounding moderate vasogenic type edema.  2/15/22- [facility] Dr. Arango [facility]- Rcd GK to 12 lesion in bran  2/16/22- Pleurex placement   3/2/22- Started Alectinib 600 mg BID  3/21/22- Dose reduced to 450 mg BID due to grade 3 myalgias and fatigue  4/2/22 to 4/5/22- Admitted at [facility] for- Severe sepsis due to MSSA infection of right PleurX catheter s/p removal- He presented with onset of pain at tube site starting 4/1; at arrival was tachycardic with leukocytosis (22.7) and elevated lactic acid (2.9).  CT chest showed fluid and stranding tracking outside the pleural space into chest wall along pleural catheter.  IR was consulted and removed catheter 4/2 with report of pustular drainage and tip culture growing MSSA.  Thoracic Surg was consulted who felt no surgical indication necessary given minimal pleural fluid and lack of any signs of abscess.  Initially treated with broad spectrum coverage for sepsis, narrowed to Ancef once sensitivities returned with plan to transition to cefadroxil for an additional 10 days at discharge per ID. Held drug 4/2 to 4/11 5/2/22- CT CAP- Overall, positive response to therapy with decreased size of right lower lobe and right pleural-based masses, pulmonary metastases, hilar and mediastinal lymphadenopathy. However, a single right posterior pleural-based mass has slightly increased in size since 2/24/2022.  No metastatic disease in the abdomen and pelvis. Right Pleurx catheter has been removed. Trace right pleural effusion and right basilar atelectasis.  5/2/22- Brain MRI- The previously demonstrated brain metastases are mildly diminished in size versus to 2/5/2022. The degree of edema is also diminished but not completely resolved. Probable trace amounts of intralesional bleeding demonstrated on the gradient sequence within the metastases. No definite new metastasis or progressive mass effect. No hydrocephalus or infarct.     He works as a maintenance manger for apartment complexes      Interval Hx:  Patient is here today alone in the clinic for follow up.  Continues on 450 mg BID of Alectinib.  Since the last visit he has noticed bilateral lateral hip pain, constant, requiring 3-4 oxycodone/day and keeping him up from sleep  Has mild aches and pains in muscle  Has ongoing fatigue, but able to push himself to work daily  Breathing is stable, no cough, no fever or chills.  Blood sugars are still high, but getting slightly better, follows PCP  He continues to work daily,  independent of ADL and IADL      REVIEW OF SYSTEMS: 14 point ROS negative other than the symptoms noted above in the HPI.    Wt Readings from Last 4 Encounters:   04/11/22 95.6 kg (210 lb 12.8 oz)   04/02/22 93 kg (205 lb)   03/21/22 93.9 kg (207 lb)   03/09/22 90.8 kg (200 lb 2.8 oz)      Review of Systems:  A comprehensive ROS was performed and found to be negative or non-contributory with the exception of that noted in the HPI above.    Past Medical History:  GERD  Hypertension, not on medication  Type 2 diabetes mellitus, not on medications currently, previously on Metformin    Past Surgical History:  Past Surgical History:   Procedure Laterality Date     BRONCHOSCOPY RIGID OR FLEXIBLE W/TRANSENDOSCOPIC ENDOBRONCHIAL ULTRASOUND GUIDED Bilateral 1/26/2022    Procedure: Right BRONCHOSCOPY, FIBEROPTIC, endobronchial ultrasound, pleural biopsy;   Surgeon: Dallin Agrawal MD;  Location: [facility] OR     INJECT BLOCK MEDIAL BRANCH CERVICAL/THORACIC/LUMBAR       INSERT CHEST TUBE Right 2022    Procedure: INSERTION, CATHETER, INTERCOSTAL, FOR DRAINAGE;  Surgeon: Dallin Agrawal MD;  Location: [facility] GI     INSERT CHEST TUBE Right 3/9/2022    Procedure: INSERTION, CATHETER, INTERCOSTAL, FOR DRAINAGE;  Surgeon: Sushila Antonio MD;  Location: [facility] GI     IR CHEST TUBE REMOVAL TUNNELED RIGHT  2022     ORTHOPEDIC SURGERY      Ganesh. Rotator cuff repair.     PLEUROSCOPY N/A 2022    Procedure: Pleuroscopy with Pleural Biopsy;  Surgeon: Dallin Agrawal MD;  [facility]       Social History:  Lives with wife and 4 kids in [place]. Works as a  for an apartment complex in [place]. Exposure to household chemicals and . No significant exposure to asbestos. No signal exposure to benzene or similar chemicals. No significant smoking history-states that he smoked 1 to 2 cigarettes occasionally per month for about 2 years in college, non-smoking since then. No significant alcohol use history. No other recreational substances. Good support system. Kids are 23, 19, 17 and 13.    Family History  Significant history for cancers on maternal side. Mother  of uterine cancer. 2 maternal uncles have possible metastatic melanoma.    Outpatient Medications:  Not currently taking any outpatient medications. Has been prescribed Metformin in the past.    Physical Exam:    Blood pressure (!) 144/96, pulse 95, temperature 98.2  F (36.8  C), temperature source Oral, weight 98 kg (216 lb), SpO2 97 %.     General: alert and cooperative, sitting up in chair  HEENT: sclera anicteric, EOMI, MMM. Pupils equal and reactive.   Neck: supple, normal ROM, no lymph nodes palpable.   CV: RRR, no murmurs  Resp: Right chest Pleurx catheter, RL and RML diminished  GI: soft, non-tender, non-distended, bowel sounds present and normoactive  MSK: warm and well-perfused,  normal tone  Skin: no rashes on limited exam, no jaundice  Neuro: Alert awake and oriented x4, strength is 5 on 5 throughout, sensations are grossly intact    Labs & Studies: I personally reviewed the following studies:  Most Recent 3 CBC's:  Recent Labs   Lab Test 05/02/22  1006 04/18/22  1331 04/04/22  0841   WBC 8.5 10.8 9.0   HGB 13.9 13.8 12.2*   MCV 92 93 94    263 171     Most Recent 3 BMP's:  Recent Labs   Lab Test 05/02/22  1006 04/18/22  1331 04/05/22  0918 04/05/22  0724 04/04/22  0911 04/04/22  0841    137  --   --   --  138   POTASSIUM 3.8 4.1  --   --   --  3.5   CHLORIDE 98 101  --   --   --  106   CO2 29 31  --   --   --  27   BUN 13 16  --   --   --  10   CR 0.64* 0.61*  --  0.62*  --  0.54*   ANIONGAP 9 5  --   --   --  5   TORY 9.1 9.8  --   --   --  8.4*   * 398* 166*  --    < > 299*    < > = values in this interval not displayed.    Most Recent 2 LFT's:  Recent Labs   Lab Test 05/02/22  1006 04/18/22  1331   AST 16 9   ALT 29 19   ALKPHOS 163* 179*   BILITOTAL 0.9 0.6    Most Recent TSH and T4:  Recent Labs   Lab Test 01/19/22  2006   TSH 1.81     I reviewed the above labs today.    ASSESSMENT AND PLAN:  Connor Corrales is a 41-year-old male non-smoker with a past medical history of hypertension and diabetes who presented with several weeks of dyspnea and found to have right-sided pleural effusion and a large right lung hilar mass, status post bronchoscopy and EBUS on 1/26/2022, with tissue sampling demonstrating adenocarcinoma on histopathological analysis.     Stage IV NSCLC, Rt lung adenocarcinoma with metastasis to pleura, mediastinum , rt pleural effusion and brain diagnosed 1/2022 (AJCC 8th edition)  PD-L1 TPS 2-3% by [place]   NGS [facility] panel-EML4:ALK rearragement; chr2:[id number], chr2:[id number]  NGS [employer]- GNAS R201H, KRAS K5E- No ALK    Overall prognosis for ALK rearrangement lung cancer is median overall survival > 7 years based on the most recent update of the WINSOME  study. He began Alectinib 600 mg BID 3/2/22 and unfortunately developed grade 3 myalgias which have improved with lowering the dose 450 mg BID. He was holding drug 4/2 to 4/11 due to MSSA infection from pleurex which is removed. Resumed at 450 mg BID, doing well, CT with good KS. Brain MRI is stable.     Plan  Continue alectinib at 450 mg BID  Oral Chemotherapy Pharmacy to continue to assist in toxicity monitoring    # Bilateral lateral hip pain- unclear cause, no cancer lesion in the PET/CT scan in the area.  --use oxycodone prn  -will get pelvic MRI ASAP     #grade 3 myalgias: resolved.  onset ~2 weeks after starting alectinib. Now has improved with holding therapy and Dose reduction as above to 450 mg BID.    #grade 2 fatigue: monitor for now     #MSSA infection, Sepsis at pleurex site- resolved  # Pleural effusion: s/p pleurex palcement  2/16/22. Then on 4/2 right PleurX catheter s/p removal for severe sepsis due to MSSA infection.    # Brain mets: Brain MRI with several brain mets, s/p GK to 11-12 brain mets. Recent MRI 5/2 showed stable disease.  -Will need Brain MRI every 3 months, next MRI 8/2/22    # T2 DM- A1c 10.1.  PTA Lantus 20 units bid while in aptient, with much improvement in glucose control. Of note pt does not have any meal time insulin, he has f/up appt with PCP soon.  Encouraged f/u with PCP for management of glucose and insulin    # HTN- restarted hydrochlorothiazide, will continue , bP today 144/96  -onitor BP at home and will f/u with PCP     #dry eyes  #blurred vision  Continue artificial tears. Saw ophthalmology previosuly, changed prescription, mow improved.  No neuro sx.     #Psychiatry  # Situational Anxiety   - Feels more irritable lately. Declined referral to therapist and denies medication intervention for now. PCP can help manage this too if he has established relationship with her.     #COVID vaccine: No COVID vaccine on record, confirm at next visit    Overall plan  MRI hips ASAP    RTC with NP/PA in 1 month   CT CAP in 2 months   RTC with me after CT    On the day of service  Chart review: 5 minutes  Visit duration: 35 minutes  Care coordination: 5 minutes    Again, thank you for allowing me to participate in the care of your patient.      Sincerely,    Bassam Persaud MD

## 2022-05-04 NOTE — NURSING NOTE
"Oncology Rooming Note    May 4, 2022 2:59 PM   Connor Emerson is a 41 year old male who presents for:    Chief Complaint   Patient presents with     Oncology Clinic Visit     Rtn for small cell lung cancer     Initial Vitals: BP (!) 144/96   Pulse 95   Temp 98.2  F (36.8  C) (Oral)   Wt 98 kg (216 lb)   SpO2 97%   BMI 31.90 kg/m   Estimated body mass index is 31.9 kg/m  as calculated from the following:    Height as of 4/2/22: 1.753 m (5' 9\").    Weight as of this encounter: 98 kg (216 lb). Body surface area is 2.18 meters squared.  Extreme Pain (8) Comment: Data Unavailable   No LMP for male patient.  Allergies reviewed: Yes  Medications reviewed: Yes    Medications: Medication refills not needed today.  Pharmacy name entered into EPIC:    "[facility] - [facility] [place], [place] - [id number]  [facility] - [place], [place] - [address]    Clinical concerns: Pt would like to talk to provider about pain in both hips he is experiencing. MD was notified.      Christi Valdez, EMT            "

## 2022-05-05 DIAGNOSIS — C34.31 MALIGNANT NEOPLASM OF LOWER LOBE OF RIGHT LUNG (H): Primary | ICD-10-CM

## 2022-05-05 DIAGNOSIS — C34.31 MALIGNANT NEOPLASM OF LOWER LOBE OF RIGHT LUNG (H): ICD-10-CM

## 2022-05-07 ENCOUNTER — DOCUMENTATION ONLY (OUTPATIENT)
Dept: ONCOLOGY | Facility: CLINIC | Age: 42
End: 2022-05-07
Payer: MEDICAID

## 2022-05-10 ENCOUNTER — MYC REFILL (OUTPATIENT)
Dept: ONCOLOGY | Facility: CLINIC | Age: 42
End: 2022-05-10
Payer: MEDICAID

## 2022-05-10 DIAGNOSIS — G89.3 CANCER ASSOCIATED PAIN: ICD-10-CM

## 2022-05-11 RX ORDER — OXYCODONE HYDROCHLORIDE 5 MG/1
5 TABLET ORAL EVERY 6 HOURS PRN
Qty: 30 TABLET | Refills: 0 | Status: SHIPPED | OUTPATIENT
Start: 2022-05-11 | End: 2022-05-27

## 2022-05-11 NOTE — TELEPHONE ENCOUNTER
Per Chelsea Vilelgas: Would like to add the tylenol and see if that helps. He can try diclofenac gel to hips bilaterally too. And try and move up imaging. Will refill some oxycodone for now but need to try and figure out cause.     Call placed to Connor and relayed above information:

## 2022-05-11 NOTE — TELEPHONE ENCOUNTER
Per Chelsea Villegas, wondering if he is taking tylenol?   Has not been taking tylenol.     Advised to try adding   1,000 mg of tylenol every 8 hours to see if that helps.     He will be getting a call to see about trying to move up MR of hips.

## 2022-05-11 NOTE — TELEPHONE ENCOUNTER
Narcotic Refill Request    Medication(s) requested:  Oxycodone 5mg   Person Requesting Refill: Connor   What pain is the medication treating: in hips and legs   How is the medication being taken?:Takes 1 in am 1 at noon and 2 at night   Does pt have enough for today? yes  Is pain being adequately controlled on the current regimen?: does not feel like it is touching his pain, would like to increase pain meds   Experiencing any side effects from medication?: None    Date of most recent appointment:  5/4/22 DR Persaud   Any No Show Visits:No   Next appointment:   6/1/22 Chelsea Villegas   Last fill date and by whom:  4/29/22 Chelsea Villegas    Reviewed: No Access     Routed/Paged provider: chelsea Villegas

## 2022-05-12 ENCOUNTER — TELEPHONE (OUTPATIENT)
Dept: NEUROSURGERY | Facility: CLINIC | Age: 42
End: 2022-05-12
Payer: MEDICAID

## 2022-05-12 NOTE — TELEPHONE ENCOUNTER
Unable to leave a voice message, mailbox is full. Patient needs an appointment in 3 months from 5/2/22 with Dr. Moran, with an MRI prior to the appointment.

## 2022-05-16 ENCOUNTER — TELEPHONE (OUTPATIENT)
Dept: ONCOLOGY | Facility: CLINIC | Age: 42
End: 2022-05-16

## 2022-05-16 ENCOUNTER — LAB (OUTPATIENT)
Dept: LAB | Facility: CLINIC | Age: 42
End: 2022-05-16
Payer: MEDICAID

## 2022-05-16 ENCOUNTER — ANCILLARY PROCEDURE (OUTPATIENT)
Dept: MRI IMAGING | Facility: CLINIC | Age: 42
End: 2022-05-16
Attending: STUDENT IN AN ORGANIZED HEALTH CARE EDUCATION/TRAINING PROGRAM
Payer: MEDICAID

## 2022-05-16 DIAGNOSIS — C34.31 MALIGNANT NEOPLASM OF LOWER LOBE OF RIGHT LUNG (H): ICD-10-CM

## 2022-05-16 DIAGNOSIS — Z79.899 ENCOUNTER FOR LONG-TERM (CURRENT) USE OF MEDICATIONS: ICD-10-CM

## 2022-05-16 DIAGNOSIS — G89.3 CANCER ASSOCIATED PAIN: ICD-10-CM

## 2022-05-16 LAB
ALBUMIN SERPL-MCNC: 3.7 G/DL (ref 3.4–5)
ALP SERPL-CCNC: 159 U/L (ref 40–150)
ALT SERPL W P-5'-P-CCNC: 26 U/L (ref 0–70)
ANION GAP SERPL CALCULATED.3IONS-SCNC: 6 MMOL/L (ref 3–14)
AST SERPL W P-5'-P-CCNC: 12 U/L (ref 0–45)
BASOPHILS # BLD AUTO: 0 10E3/UL (ref 0–0.2)
BASOPHILS NFR BLD AUTO: 1 %
BILIRUB SERPL-MCNC: 0.6 MG/DL (ref 0.2–1.3)
BUN SERPL-MCNC: 15 MG/DL (ref 7–30)
CALCIUM SERPL-MCNC: 9.3 MG/DL (ref 8.5–10.1)
CHLORIDE BLD-SCNC: 101 MMOL/L (ref 94–109)
CK SERPL-CCNC: 58 U/L (ref 30–300)
CO2 SERPL-SCNC: 31 MMOL/L (ref 20–32)
CREAT SERPL-MCNC: 0.73 MG/DL (ref 0.66–1.25)
EOSINOPHIL # BLD AUTO: 0.2 10E3/UL (ref 0–0.7)
EOSINOPHIL NFR BLD AUTO: 3 %
ERYTHROCYTE [DISTWIDTH] IN BLOOD BY AUTOMATED COUNT: 13.2 % (ref 10–15)
GFR SERPL CREATININE-BSD FRML MDRD: >90 ML/MIN/1.73M2
GLUCOSE BLD-MCNC: 430 MG/DL (ref 70–99)
HCT VFR BLD AUTO: 39.7 % (ref 40–53)
HGB BLD-MCNC: 13.7 G/DL (ref 13.3–17.7)
IMM GRANULOCYTES # BLD: 0 10E3/UL
IMM GRANULOCYTES NFR BLD: 1 %
LYMPHOCYTES # BLD AUTO: 1.8 10E3/UL (ref 0.8–5.3)
LYMPHOCYTES NFR BLD AUTO: 23 %
MCH RBC QN AUTO: 31.4 PG (ref 26.5–33)
MCHC RBC AUTO-ENTMCNC: 34.5 G/DL (ref 31.5–36.5)
MCV RBC AUTO: 91 FL (ref 78–100)
MONOCYTES # BLD AUTO: 0.5 10E3/UL (ref 0–1.3)
MONOCYTES NFR BLD AUTO: 7 %
NEUTROPHILS # BLD AUTO: 5.3 10E3/UL (ref 1.6–8.3)
NEUTROPHILS NFR BLD AUTO: 65 %
NRBC # BLD AUTO: 0 10E3/UL
NRBC BLD AUTO-RTO: 0 /100
PLATELET # BLD AUTO: 239 10E3/UL (ref 150–450)
POTASSIUM BLD-SCNC: 3.4 MMOL/L (ref 3.4–5.3)
PROT SERPL-MCNC: 6.8 G/DL (ref 6.8–8.8)
RBC # BLD AUTO: 4.37 10E6/UL (ref 4.4–5.9)
SODIUM SERPL-SCNC: 138 MMOL/L (ref 133–144)
WBC # BLD AUTO: 7.9 10E3/UL (ref 4–11)

## 2022-05-16 PROCEDURE — 80053 COMPREHEN METABOLIC PANEL: CPT

## 2022-05-16 PROCEDURE — 255N000002 HC RX 255 OP 636: Performed by: STUDENT IN AN ORGANIZED HEALTH CARE EDUCATION/TRAINING PROGRAM

## 2022-05-16 PROCEDURE — 73723 MRI JOINT LWR EXTR W/O&W/DYE: CPT | Mod: 50

## 2022-05-16 PROCEDURE — A9585 GADOBUTROL INJECTION: HCPCS | Performed by: STUDENT IN AN ORGANIZED HEALTH CARE EDUCATION/TRAINING PROGRAM

## 2022-05-16 PROCEDURE — 73723 MRI JOINT LWR EXTR W/O&W/DYE: CPT | Mod: LT

## 2022-05-16 PROCEDURE — 82550 ASSAY OF CK (CPK): CPT

## 2022-05-16 PROCEDURE — 36415 COLL VENOUS BLD VENIPUNCTURE: CPT

## 2022-05-16 PROCEDURE — 85025 COMPLETE CBC W/AUTO DIFF WBC: CPT

## 2022-05-16 RX ORDER — GADOBUTROL 604.72 MG/ML
10 INJECTION INTRAVENOUS ONCE
Status: COMPLETED | OUTPATIENT
Start: 2022-05-16 | End: 2022-05-16

## 2022-05-16 RX ORDER — GADOBUTROL 604.72 MG/ML
0.1 INJECTION INTRAVENOUS ONCE
Status: DISCONTINUED | OUTPATIENT
Start: 2022-05-16 | End: 2022-05-16

## 2022-05-16 RX ADMIN — GADOBUTROL 10 ML: 604.72 INJECTION INTRAVENOUS at 14:38

## 2022-05-16 NOTE — TELEPHONE ENCOUNTER
Oral Chemotherapy Monitoring Program    Subjective/Objective:  Connor Emerson is a 42 year old male contacted by phone for a follow-up visit for oral chemotherapy. Connor confirms taking the appropriate dose of Alecensa 450mg (3x 150mg tablet) twice daily. Denies missed doses, medication changes or recent hospital or ED visits. Patient reports that his vision has changed recently - it improved from -4.0 to be -2.0, but upon receiving his new glasses he is unable to see with them. He plans to follow-up with his eye doctor regarding this.     ORAL CHEMOTHERAPY 3/16/2022 3/18/2022 3/28/2022 4/1/2022 4/18/2022 5/7/2022 5/16/2022   Assessment Type Other Chart Review Initial Follow up;Other Left Voicemail Lab Monitoring Chart Review Lab Monitoring;Monthly Follow up   Diagnosis Code Non-Small Cell Lung Cancer Non-Small Cell Lung Cancer Non-Small Cell Lung Cancer Non-Small Cell Lung Cancer Non-Small Cell Lung Cancer Non-Small Cell Lung Cancer Non-Small Cell Lung Cancer   Providers Dr. Cori Persaud   [facility]   Drug Name Alecensa (alectinib) Alecensa (alectinib) Alecensa (alectinib) Alecensa (alectinib) Alecensa (alectinib) Alecensa (alectinib) Alecensa (alectinib)   Dose 600 mg - 450 mg 450 mg 450 mg 450 mg 450 mg   Current Schedule BID - BID BID BID BID BID   Cycle Details Continuous Drug on Hold - - Continuous Continuous Continuous   Start Date of Last Cycle - - 3/21/2022 - 4/11/2022 - -   Doses missed in last 2 weeks - - 0 - 0 - 0   Adherence Assessment - - Adherent - Adherent - Adherent   Adverse Effects - - Other (See Note for Details) - Fatigue - Fatigue   Other (See Note for Details) - - Eye disturbance  - - - -   Pharmacist intervention(other) - - Yes - - - -   Intervention(s) - - Referral to oncology provider - - - -   Any new drug interactions? - - - - - - No   Is the dose as  "ordered appropriate for the patient? - - - - - - Yes   Is the patient currently in pain? Yes - - - - - -   Does the patient feel the pain is currently being managed by a provider? No - - - - - -   Since the last time we talked, have you been hospitalized or used the emergency room? - - - - - - No       Last PHQ-2 Score on record:   PHQ-2 ( 1999 [employer]) 2/24/2022 1/25/2022   Q1: Little interest or pleasure in doing things 0 0   Q2: Feeling down, depressed or hopeless 0 0   PHQ-2 Score 0 0   PHQ-2 Total Score (12-17 Years)- Positive if 3 or more points; Administer PHQ-A if positive - -   Q1: Little interest or pleasure in doing things Not at all -   Q2: Feeling down, depressed or hopeless Not at all -   PHQ-2 Score 0 -       Vitals:  BP:   BP Readings from Last 1 Encounters:   05/04/22 (!) 144/96     Wt Readings from Last 1 Encounters:   05/04/22 98 kg (216 lb)     Estimated body surface area is 2.18 meters squared as calculated from the following:    Height as of 4/2/22: 1.753 m (5' 9\").    Weight as of 5/4/22: 98 kg (216 lb).    Labs:  _  Result Component Current Result Ref Range   Sodium 138 (5/16/2022) 133 - 144 mmol/L     _  Result Component Current Result Ref Range   Potassium 3.4 (5/16/2022) 3.4 - 5.3 mmol/L     _  Result Component Current Result Ref Range   Calcium 9.3 (5/16/2022) 8.5 - 10.1 mg/dL     No results found for Mag within last 30 days.     No results found for Phos within last 30 days.     _  Result Component Current Result Ref Range   Albumin 3.7 (5/16/2022) 3.4 - 5.0 g/dL     _  Result Component Current Result Ref Range   Urea Nitrogen 15 (5/16/2022) 7 - 30 mg/dL     _  Result Component Current Result Ref Range   Creatinine 0.73 (5/16/2022) 0.66 - 1.25 mg/dL     _  Result Component Current Result Ref Range   AST 12 (5/16/2022) 0 - 45 U/L     _  Result Component Current Result Ref Range   ALT 26 (5/16/2022) 0 - 70 U/L     _  Result Component Current Result Ref Range   Bilirubin Total 0.6 " "(5/16/2022) 0.2 - 1.3 mg/dL     _  Result Component Current Result Ref Range   WBC Count 7.9 (5/16/2022) 4.0 - 11.0 10e3/uL     _  Result Component Current Result Ref Range   Hemoglobin 13.7 (5/16/2022) 13.3 - 17.7 g/dL     _  Result Component Current Result Ref Range   Platelet Count 239 (5/16/2022) 150 - 450 10e3/uL     No results found for ANC within last 30 days.     _  Result Component Current Result Ref Range   Absolute Neutrophils 5.3 (5/16/2022) 1.6 - 8.3 10e3/uL        Assessment/Plan:  Connor is tolerating therapy well. Lab results demonstrate an elevated blood glucose of 430 - patient indicated \"I've got bad habits and need to work on it.\" The remainder of the labs have no concerns. Continue Alecensa therapy as planned. Encouraged patient to work on improved blood sugar control as this could further contribute to the vision issues he is having.     Follow-Up:  5/31: labs  6/1: appt with Chelsea Cordova, PharmD, BCACP  Hematology/Oncology Clinical Pharmacist  Oral Chemotherapy Monitoring Program  [facility]  [phone]   "

## 2022-05-23 NOTE — TELEPHONE ENCOUNTER
Left message to please call the triage line at [phone] option 5 option 2 and ask to speak to a triage nurse regarding your my chart message

## 2022-05-24 NOTE — TELEPHONE ENCOUNTER
Unusual event with SOB, could not catch his breath and felt like he could not breathe this happened on Sunday night after they were out on the boat.     Today is feeling better, states breathing is heavy at times, like when his lungs were filled with fluid. That was a couple of weeks ago, they took the drainage out of his side but his breathing now feels the same    Was in hospital for severe sepsis and had chest tube at that time.   This is all the time now, feels a little SOB. No headache.   Denies fever.   Denies pain with breathing.   States is doing well with drinking fluids.    12:39 Paged Dr Persaud    13:20 Paged Dr Persaud for 2nd time     13:27 Dr Persaud called back:   Lets get chest x ray first and will see if needs to have further scans from there.     [facility] is closest location for him to get chest x ray done.   Patient requesting Thursday or Friday for chest x ray as he is in [place] right now and not coming back until tomorrow.     If develops fever or SOB then needs to go to ER where he is located.

## 2022-05-31 ENCOUNTER — LAB (OUTPATIENT)
Dept: LAB | Facility: CLINIC | Age: 42
End: 2022-05-31
Payer: MEDICAID

## 2022-05-31 DIAGNOSIS — C34.31 MALIGNANT NEOPLASM OF LOWER LOBE OF RIGHT LUNG (H): ICD-10-CM

## 2022-05-31 DIAGNOSIS — Z79.899 ENCOUNTER FOR LONG-TERM (CURRENT) USE OF MEDICATIONS: ICD-10-CM

## 2022-05-31 LAB
ALBUMIN SERPL-MCNC: 4 G/DL (ref 3.4–5)
ALP SERPL-CCNC: 131 U/L (ref 40–150)
ALT SERPL W P-5'-P-CCNC: 22 U/L (ref 0–70)
ANION GAP SERPL CALCULATED.3IONS-SCNC: 8 MMOL/L (ref 3–14)
AST SERPL W P-5'-P-CCNC: 9 U/L (ref 0–45)
BASOPHILS # BLD AUTO: 0.1 10E3/UL (ref 0–0.2)
BASOPHILS NFR BLD AUTO: 1 %
BILIRUB SERPL-MCNC: 0.8 MG/DL (ref 0.2–1.3)
BUN SERPL-MCNC: 24 MG/DL (ref 7–30)
CALCIUM SERPL-MCNC: 9.3 MG/DL (ref 8.5–10.1)
CHLORIDE BLD-SCNC: 106 MMOL/L (ref 94–109)
CK SERPL-CCNC: 52 U/L (ref 30–300)
CO2 SERPL-SCNC: 26 MMOL/L (ref 20–32)
CREAT SERPL-MCNC: 0.77 MG/DL (ref 0.66–1.25)
EOSINOPHIL # BLD AUTO: 0.4 10E3/UL (ref 0–0.7)
EOSINOPHIL NFR BLD AUTO: 4 %
ERYTHROCYTE [DISTWIDTH] IN BLOOD BY AUTOMATED COUNT: 13.4 % (ref 10–15)
GFR SERPL CREATININE-BSD FRML MDRD: >90 ML/MIN/1.73M2
GLUCOSE BLD-MCNC: 417 MG/DL (ref 70–99)
HCT VFR BLD AUTO: 42.1 % (ref 40–53)
HGB BLD-MCNC: 13.9 G/DL (ref 13.3–17.7)
LYMPHOCYTES # BLD AUTO: 2.4 10E3/UL (ref 0.8–5.3)
LYMPHOCYTES NFR BLD AUTO: 27 %
MCH RBC QN AUTO: 31.3 PG (ref 26.5–33)
MCHC RBC AUTO-ENTMCNC: 33 G/DL (ref 31.5–36.5)
MCV RBC AUTO: 95 FL (ref 78–100)
MONOCYTES # BLD AUTO: 0.6 10E3/UL (ref 0–1.3)
MONOCYTES NFR BLD AUTO: 6 %
NEUTROPHILS # BLD AUTO: 5.6 10E3/UL (ref 1.6–8.3)
NEUTROPHILS NFR BLD AUTO: 62 %
PLATELET # BLD AUTO: 211 10E3/UL (ref 150–450)
POTASSIUM BLD-SCNC: 4 MMOL/L (ref 3.4–5.3)
PROT SERPL-MCNC: 7.2 G/DL (ref 6.8–8.8)
RBC # BLD AUTO: 4.44 10E6/UL (ref 4.4–5.9)
SODIUM SERPL-SCNC: 140 MMOL/L (ref 133–144)
WBC # BLD AUTO: 9 10E3/UL (ref 4–11)

## 2022-05-31 PROCEDURE — 36415 COLL VENOUS BLD VENIPUNCTURE: CPT

## 2022-05-31 PROCEDURE — 82550 ASSAY OF CK (CPK): CPT

## 2022-05-31 PROCEDURE — 80053 COMPREHEN METABOLIC PANEL: CPT

## 2022-05-31 PROCEDURE — 85025 COMPLETE CBC W/AUTO DIFF WBC: CPT

## 2022-06-01 ENCOUNTER — HOSPITAL ENCOUNTER (OUTPATIENT)
Dept: GENERAL RADIOLOGY | Facility: CLINIC | Age: 42
Discharge: HOME | End: 2022-06-01
Attending: STUDENT IN AN ORGANIZED HEALTH CARE EDUCATION/TRAINING PROGRAM | Admitting: STUDENT IN AN ORGANIZED HEALTH CARE EDUCATION/TRAINING PROGRAM
Payer: COMMERCIAL

## 2022-06-01 DIAGNOSIS — C34.31 MALIGNANT NEOPLASM OF LOWER LOBE OF RIGHT LUNG (H): ICD-10-CM

## 2022-06-01 PROCEDURE — 71046 X-RAY EXAM CHEST 2 VIEWS: CPT

## 2022-06-02 NOTE — TELEPHONE ENCOUNTER
Left voice message for patient to return the call to schedule a follow up appointment in 3 months from 5/2/22, with an MRI prior.

## 2022-06-06 ENCOUNTER — TELEPHONE (OUTPATIENT)
Dept: ONCOLOGY | Facility: CLINIC | Age: 42
End: 2022-06-06
Payer: COMMERCIAL

## 2022-06-06 NOTE — TELEPHONE ENCOUNTER
Oral Chemotherapy Monitoring Program    Subjective/Objective:  Connor Emerson is a 42 year old male contacted by phone for a follow-up visit for oral chemotherapy. Connor confirmed taking 3 tablets (450 mg) alectinib twice daily and reports no missed doses. Patient states he is tolerating therapy well and denies all side effects including nausea, diarrhea, and constipation.    ORAL CHEMOTHERAPY 3/18/2022 3/28/2022 4/1/2022 4/18/2022 5/7/2022 5/16/2022 6/6/2022   Assessment Type Chart Review Initial Follow up;Other Left Voicemail Lab Monitoring Chart Review Lab Monitoring;Monthly Follow up Monthly Follow up   Diagnosis Code Non-Small Cell Lung Cancer Non-Small Cell Lung Cancer Non-Small Cell Lung Cancer Non-Small Cell Lung Cancer Non-Small Cell Lung Cancer Non-Small Cell Lung Cancer Non-Small Cell Lung Cancer   Providers Dr. Cori Persaud   [facility]   Drug Name Alecensa (alectinib) Alecensa (alectinib) Alecensa (alectinib) Alecensa (alectinib) Alecensa (alectinib) Alecensa (alectinib) Alecensa (alectinib)   Dose - 450 mg 450 mg 450 mg 450 mg 450 mg 450 mg   Current Schedule - BID BID BID BID BID BID   Cycle Details Drug on Hold - - Continuous Continuous Continuous Continuous   Start Date of Last Cycle - 3/21/2022 - 4/11/2022 - - -   Doses missed in last 2 weeks - 0 - 0 - 0 -   Adherence Assessment - Adherent - Adherent - Adherent -   Adverse Effects - Other (See Note for Details) - Fatigue - Fatigue -   Other (See Note for Details) - Eye disturbance  - - - - -   Pharmacist intervention(other) - Yes - - - - -   Intervention(s) - Referral to oncology provider - - - - -   Any new drug interactions? - - - - - No -   Is the dose as ordered appropriate for the patient? - - - - - Yes -   Is the patient currently in pain? - - - - - - -   Does the patient feel the pain is currently being  "managed by a provider? - - - - - - -   Since the last time we talked, have you been hospitalized or used the emergency room? - - - - - No -       Last PHQ-2 Score on record:   PHQ-2 ( 1999 Pfizer) 2/24/2022 1/25/2022   Q1: Little interest or pleasure in doing things 0 0   Q2: Feeling down, depressed or hopeless 0 0   PHQ-2 Score 0 0   PHQ-2 Total Score (12-17 Years)- Positive if 3 or more points; Administer PHQ-A if positive - -   Q1: Little interest or pleasure in doing things Not at all -   Q2: Feeling down, depressed or hopeless Not at all -   PHQ-2 Score 0 -       Vitals:  BP:   BP Readings from Last 1 Encounters:   05/04/22 (!) 144/96     Wt Readings from Last 1 Encounters:   05/04/22 98 kg (216 lb)     Estimated body surface area is 2.18 meters squared as calculated from the following:    Height as of 4/2/22: 1.753 m (5' 9\").    Weight as of 5/4/22: 98 kg (216 lb).    Labs:  _  Result Component Current Result Ref Range   Sodium 140 (5/31/2022) 133 - 144 mmol/L     _  Result Component Current Result Ref Range   Potassium 4.0 (5/31/2022) 3.4 - 5.3 mmol/L     _  Result Component Current Result Ref Range   Calcium 9.3 (5/31/2022) 8.5 - 10.1 mg/dL     No results found for Mag within last 30 days.     No results found for Phos within last 30 days.     _  Result Component Current Result Ref Range   Albumin 4.0 (5/31/2022) 3.4 - 5.0 g/dL     _  Result Component Current Result Ref Range   Urea Nitrogen 24 (5/31/2022) 7 - 30 mg/dL     _  Result Component Current Result Ref Range   Creatinine 0.77 (5/31/2022) 0.66 - 1.25 mg/dL     _  Result Component Current Result Ref Range   AST 9 (5/31/2022) 0 - 45 U/L     _  Result Component Current Result Ref Range   ALT 22 (5/31/2022) 0 - 70 U/L     _  Result Component Current Result Ref Range   Bilirubin Total 0.8 (5/31/2022) 0.2 - 1.3 mg/dL     _  Result Component Current Result Ref Range   WBC Count 9.0 (5/31/2022) 4.0 - 11.0 10e3/uL     _  Result Component Current Result Ref " Range   Hemoglobin 13.9 (5/31/2022) 13.3 - 17.7 g/dL     _  Result Component Current Result Ref Range   Platelet Count 211 (5/31/2022) 150 - 450 10e3/uL     No results found for ANC within last 30 days.     _  Result Component Current Result Ref Range   Absolute Neutrophils 5.6 (5/31/2022) 1.6 - 8.3 10e3/uL          Assessment/Plan:  Continue alectinib therapy.    Follow-Up:  Review labs on 6/14.        Edy Watson, Pharmacy Intern  Oral Chemotherapy Monitoring Program  [phone]

## 2022-06-10 ENCOUNTER — APPOINTMENT (OUTPATIENT)
Dept: MRI IMAGING | Facility: CLINIC | Age: 42
End: 2022-06-10
Attending: EMERGENCY MEDICINE
Payer: COMMERCIAL

## 2022-06-10 ENCOUNTER — MYC MEDICAL ADVICE (OUTPATIENT)
Dept: RADIATION ONCOLOGY | Facility: CLINIC | Age: 42
End: 2022-06-10
Payer: COMMERCIAL

## 2022-06-10 ENCOUNTER — HOSPITAL ENCOUNTER (EMERGENCY)
Facility: CLINIC | Age: 42
Discharge: LEFT BEFORE DISCHARGE | End: 2022-06-10
Attending: EMERGENCY MEDICINE | Admitting: EMERGENCY MEDICINE
Payer: COMMERCIAL

## 2022-06-10 VITALS
HEART RATE: 100 BPM | OXYGEN SATURATION: 98 % | WEIGHT: 220 LBS | SYSTOLIC BLOOD PRESSURE: 204 MMHG | BODY MASS INDEX: 32.49 KG/M2 | TEMPERATURE: 97.8 F | RESPIRATION RATE: 18 BRPM | DIASTOLIC BLOOD PRESSURE: 127 MMHG

## 2022-06-10 DIAGNOSIS — C79.31 BRAIN METASTASIS: ICD-10-CM

## 2022-06-10 DIAGNOSIS — Z53.29 LEFT AGAINST MEDICAL ADVICE: ICD-10-CM

## 2022-06-10 DIAGNOSIS — I10 UNCONTROLLED HYPERTENSION: ICD-10-CM

## 2022-06-10 DIAGNOSIS — G93.6 VASOGENIC BRAIN EDEMA (H): ICD-10-CM

## 2022-06-10 DIAGNOSIS — E11.65 UNCONTROLLED TYPE 2 DIABETES MELLITUS WITH HYPERGLYCEMIA (H): ICD-10-CM

## 2022-06-10 DIAGNOSIS — Z91.148 NONCOMPLIANCE WITH MEDICATION REGIMEN: ICD-10-CM

## 2022-06-10 DIAGNOSIS — R47.01 EXPRESSIVE APHASIA: ICD-10-CM

## 2022-06-10 LAB
ANION GAP SERPL CALCULATED.3IONS-SCNC: 6 MMOL/L (ref 3–14)
BASOPHILS # BLD AUTO: 0 10E3/UL (ref 0–0.2)
BASOPHILS NFR BLD AUTO: 1 %
BUN SERPL-MCNC: 20 MG/DL (ref 7–30)
CALCIUM SERPL-MCNC: 9.1 MG/DL (ref 8.5–10.1)
CHLORIDE BLD-SCNC: 108 MMOL/L (ref 94–109)
CO2 SERPL-SCNC: 27 MMOL/L (ref 20–32)
CREAT SERPL-MCNC: 0.55 MG/DL (ref 0.66–1.25)
EOSINOPHIL # BLD AUTO: 0.2 10E3/UL (ref 0–0.7)
EOSINOPHIL NFR BLD AUTO: 3 %
ERYTHROCYTE [DISTWIDTH] IN BLOOD BY AUTOMATED COUNT: 13.1 % (ref 10–15)
GFR SERPL CREATININE-BSD FRML MDRD: >90 ML/MIN/1.73M2
GLUCOSE BLD-MCNC: 301 MG/DL (ref 70–99)
HCT VFR BLD AUTO: 41.7 % (ref 40–53)
HGB BLD-MCNC: 14.2 G/DL (ref 13.3–17.7)
HOLD SPECIMEN: NORMAL
HOLD SPECIMEN: NORMAL
IMM GRANULOCYTES # BLD: 0 10E3/UL
IMM GRANULOCYTES NFR BLD: 0 %
LYMPHOCYTES # BLD AUTO: 2 10E3/UL (ref 0.8–5.3)
LYMPHOCYTES NFR BLD AUTO: 28 %
MCH RBC QN AUTO: 31.2 PG (ref 26.5–33)
MCHC RBC AUTO-ENTMCNC: 34.1 G/DL (ref 31.5–36.5)
MCV RBC AUTO: 92 FL (ref 78–100)
MONOCYTES # BLD AUTO: 0.5 10E3/UL (ref 0–1.3)
MONOCYTES NFR BLD AUTO: 7 %
NEUTROPHILS # BLD AUTO: 4.3 10E3/UL (ref 1.6–8.3)
NEUTROPHILS NFR BLD AUTO: 61 %
NRBC # BLD AUTO: 0 10E3/UL
NRBC BLD AUTO-RTO: 0 /100
PLATELET # BLD AUTO: 199 10E3/UL (ref 150–450)
POTASSIUM BLD-SCNC: 3.6 MMOL/L (ref 3.4–5.3)
RBC # BLD AUTO: 4.55 10E6/UL (ref 4.4–5.9)
SARS-COV-2 RNA RESP QL NAA+PROBE: NEGATIVE
SODIUM SERPL-SCNC: 141 MMOL/L (ref 133–144)
WBC # BLD AUTO: 7.1 10E3/UL (ref 4–11)

## 2022-06-10 PROCEDURE — 250N000011 HC RX IP 250 OP 636: Performed by: EMERGENCY MEDICINE

## 2022-06-10 PROCEDURE — 99285 EMERGENCY DEPT VISIT HI MDM: CPT | Mod: 25

## 2022-06-10 PROCEDURE — 255N000002 HC RX 255 OP 636: Performed by: EMERGENCY MEDICINE

## 2022-06-10 PROCEDURE — C9803 HOPD COVID-19 SPEC COLLECT: HCPCS

## 2022-06-10 PROCEDURE — 85025 COMPLETE CBC W/AUTO DIFF WBC: CPT | Performed by: EMERGENCY MEDICINE

## 2022-06-10 PROCEDURE — 80048 BASIC METABOLIC PNL TOTAL CA: CPT | Performed by: EMERGENCY MEDICINE

## 2022-06-10 PROCEDURE — 36415 COLL VENOUS BLD VENIPUNCTURE: CPT | Performed by: EMERGENCY MEDICINE

## 2022-06-10 PROCEDURE — 70553 MRI BRAIN STEM W/O & W/DYE: CPT

## 2022-06-10 PROCEDURE — A9585 GADOBUTROL INJECTION: HCPCS | Performed by: EMERGENCY MEDICINE

## 2022-06-10 PROCEDURE — U0003 INFECTIOUS AGENT DETECTION BY NUCLEIC ACID (DNA OR RNA); SEVERE ACUTE RESPIRATORY SYNDROME CORONAVIRUS 2 (SARS-COV-2) (CORONAVIRUS DISEASE [COVID-19]), AMPLIFIED PROBE TECHNIQUE, MAKING USE OF HIGH THROUGHPUT TECHNOLOGIES AS DESCRIBED BY CMS-2020-01-R: HCPCS | Performed by: EMERGENCY MEDICINE

## 2022-06-10 PROCEDURE — 96374 THER/PROPH/DIAG INJ IV PUSH: CPT | Mod: 59

## 2022-06-10 PROCEDURE — 258N000003 HC RX IP 258 OP 636: Performed by: EMERGENCY MEDICINE

## 2022-06-10 PROCEDURE — 96361 HYDRATE IV INFUSION ADD-ON: CPT

## 2022-06-10 RX ORDER — DEXAMETHASONE 2 MG/1
2 TABLET ORAL 3 TIMES DAILY
Qty: 21 TABLET | Refills: 0 | Status: ON HOLD | OUTPATIENT
Start: 2022-06-10 | End: 2022-06-17

## 2022-06-10 RX ORDER — DEXAMETHASONE SODIUM PHOSPHATE 10 MG/ML
10 INJECTION, SOLUTION INTRAMUSCULAR; INTRAVENOUS ONCE
Status: COMPLETED | OUTPATIENT
Start: 2022-06-10 | End: 2022-06-10

## 2022-06-10 RX ORDER — GADOBUTROL 604.72 MG/ML
10 INJECTION INTRAVENOUS ONCE
Status: COMPLETED | OUTPATIENT
Start: 2022-06-10 | End: 2022-06-10

## 2022-06-10 RX ADMIN — DEXAMETHASONE SODIUM PHOSPHATE 10 MG: 10 INJECTION, SOLUTION INTRAMUSCULAR; INTRAVENOUS at 15:15

## 2022-06-10 RX ADMIN — GADOBUTROL 10 ML: 604.72 INJECTION INTRAVENOUS at 12:25

## 2022-06-10 RX ADMIN — SODIUM CHLORIDE 1000 ML: 9 INJECTION, SOLUTION INTRAVENOUS at 12:09

## 2022-06-10 ASSESSMENT — ENCOUNTER SYMPTOMS
NUMBNESS: 0
FACIAL ASYMMETRY: 1
CONFUSION: 0
SPEECH DIFFICULTY: 1
HEADACHES: 0
WEAKNESS: 0
ACTIVITY CHANGE: 0

## 2022-06-10 NOTE — ED TRIAGE NOTES
Pt here with wife for evaluation of headache and confusion. Pt has known lung cancer with mets to brain. Wife told to come here for scans. Pt answers questions appropriately. Currently taking oral chemotherapy and radiation.      Triage Assessment     Row Name 06/10/22 1127       Triage Assessment (Adult)    Airway WDL WDL       Respiratory WDL    Respiratory WDL WDL       Skin Circulation/Temperature WDL    Skin Circulation/Temperature WDL WDL       Cardiac WDL    Cardiac WDL WDL       Peripheral/Neurovascular WDL    Peripheral Neurovascular WDL WDL       Cognitive/Neuro/Behavioral WDL    Cognitive/Neuro/Behavioral WDL WDL

## 2022-06-10 NOTE — DISCHARGE INSTRUCTIONS
Return immediately if you change your mind regarding admission or if you have any worsening symptoms.  Please call neurosurgery to arrange follow-up.  Take steroids as instructed.  However, you will have even worse high blood sugar which can lead to diabetic ketoacidosis and death.  Please do not miss any doses of your insulin or blood pressure medication.

## 2022-06-10 NOTE — TELEPHONE ENCOUNTER
As per Dr Moran, patient should go to the ED for evaluation.   Called patient and patient's spouse, Candance. They will go to [facility] ED ASAP.

## 2022-06-10 NOTE — PROGRESS NOTES
Clinical history, imaging and plans reviewed myself as well as with Dr. Dean Moran and Dr. Hendricks had a conversation regarding patient, imaging and plans   Final recommendation to transfer to [facility] in Dr. Hendricks care for possible surgical intervention   Our team will sign off at this time    All in agreement with plans

## 2022-06-10 NOTE — ED PROVIDER NOTES
"  History     Chief Complaint:  Word finding difficulty    The history is provided by the patient, the spouse and medical records. The history is limited by the condition of the patient (Speech change).      Connor Emerson is a 42 year old male with hypertension, diabetes, and metastatic non-small cell lung cancer with brain metastasis status post gamma knife radiation in February on [place] who presents with word finding difficulty.  Connor just told his wife yesterday that for the last week he has had difficulties finding words and it is taking him much longer to perform his usual tasks.  His wife did notice this to a mild degree a few days ago but did not say anything.  They contacted neurosurgery yesterday who had recommended brain MRI.  However, today he had to leave work because he felt his symptoms were worsening, unable to even write words for his job.  They called again neurosurgery and were recommended to visit the emergency department.  He has not had any numbness or tingling nor weakness although his wife is concerned that he may have right facial droop today.  He denies any vision changes.  He denies any pain including headache.    Review of Systems   Constitutional: Negative for activity change.   Eyes: Negative for visual disturbance.   Musculoskeletal: Negative for gait problem.   Neurological: Positive for facial asymmetry (per wife) and speech difficulty. Negative for weakness, numbness and headaches.   Psychiatric/Behavioral: Negative for confusion.   All other systems reviewed and are negative.    Allergies:  Vicodin [Hydrocodone-Acetaminophen]    Medications:    hydrochlorothiazide (HYDRODIURIL) 25 MG tablet  alectinib (ALECENSA) 150 MG CAPS  Connor's wife says he will not take his insulin (Lantus and Novolog) because he \"does not care anymore\".    Past Medical History:    Past Medical History:   Diagnosis Date     Atypical chest pain 12/02/2013     Complication of anesthesia      Diabetes (H)  "     GERD (gastroesophageal reflux disease) 12/02/2013     HTN (hypertension) 05/14/2012     Insomnia 02/21/2012     Mediastinal lymphadenopathy      Migraine headache 07/02/2013     Pneumonia      Patient Active Problem List    Diagnosis Date Noted     Complication of chest tube, initial encounter 04/02/2022     Priority: Medium     Severe sepsis (H) 04/02/2022     Priority: Medium     Brain metastasis (H) 02/18/2022     Priority: Medium     Malignant neoplasm of lower lobe of right lung (H) 02/15/2022     Priority: Medium     Pleural effusion on right 01/19/2022     Priority: Medium     Morbid obesity (H) 11/27/2017     Priority: Medium     Cellulitis of hand, right 04/27/2017     Priority: Medium     Lateral epicondylitis of right elbow 11/11/2016     Priority: Medium     Adjustment disorder with mixed anxiety and depressed mood 04/23/2016     Priority: Medium     Anxiety 07/09/2013     Priority: Medium     Obesity 11/26/2012     Priority: Medium     Insomnia 02/21/2012     Priority: Medium     Low back pain 02/04/2012        Past Surgical History:    Past Surgical History:   Procedure Laterality Date     BRONCHOSCOPY RIGID OR FLEXIBLE W/TRANSENDOSCOPIC ENDOBRONCHIAL ULTRASOUND GUIDED Bilateral 1/26/2022    Procedure: Right BRONCHOSCOPY, FIBEROPTIC, endobronchial ultrasound, pleural biopsy;  Surgeon: Dallin Agrawal MD;  Location: [facility] OR     INJECT BLOCK MEDIAL BRANCH CERVICAL/THORACIC/LUMBAR       INSERT CHEST TUBE Right 2/16/2022    Procedure: INSERTION, CATHETER, INTERCOSTAL, FOR DRAINAGE;  Surgeon: Dallin Agrawal MD;  Location: [facility] GI     INSERT CHEST TUBE Right 3/9/2022    Procedure: INSERTION, CATHETER, INTERCOSTAL, FOR DRAINAGE;  Surgeon: Sushila Antonio MD;  Location: [facility]     IR CHEST TUBE REMOVAL TUNNELED RIGHT  4/2/2022     ORTHOPEDIC SURGERY      Ganesh. Rotator cuff repair.     PLEUROSCOPY N/A 1/26/2022    Procedure: Pleuroscopy with Pleural Biopsy;  Surgeon: Dallin Agrawal MD;  Location: [facility] OR      Social History:  The patient presents to the ED with his wife.   Has 3 children.   Does not smoke.     Physical Exam     Patient Vitals for the past 24 hrs:   BP Temp Temp src Pulse Resp SpO2 Weight   06/10/22 1805 -- -- -- -- -- 98 % --   06/10/22 1800 (!) 204/127 -- -- 100 -- 98 % --   06/10/22 1635 -- -- -- -- -- 100 % --   06/10/22 1630 (!) 194/123 -- -- 91 -- 98 % --   06/10/22 1605 -- -- -- -- -- 94 % --   06/10/22 1600 (!) 177/137 -- -- 80 -- 96 % --   06/10/22 1430 (!) 163/111 -- -- 66 -- -- --   06/10/22 1400 (!) 163/108 -- -- 67 -- -- --   06/10/22 1330 (!) 155/111 -- -- 79 -- -- --   06/10/22 1308 (!) 150/115 -- -- 73 -- -- --   06/10/22 1200 (!) 165/113 -- -- 76 -- -- --   06/10/22 1157 (!) 153/107 -- -- 70 -- -- --   06/10/22 1129 (!) 175/112 97.8  F (36.6  C) Temporal 85 18 98 % 99.8 kg (220 lb)     Physical Exam  General: Well-developed and well-nourished. Well appearing middle aged  man. Cooperative.  Head:  Atraumatic.  Eyes:  Conjunctivae, lids, and sclerae are normal.  ENT:    Normal nose. Moist mucous membranes.  Neck:  Supple. Normal range of motion.  CV:  Regular rate and rhythm. Normal heart sounds with no murmurs, rubs, or gallops detected.  Resp:  No respiratory distress. Clear to auscultation bilaterally without decreased breath sounds, wheezing, rales, or rhonchi.  GI:  Soft. Non-distended. Non-tender.    MS:  Normal ROM. No bilateral lower extremity edema.  Skin:  Warm. Non-diaphoretic. No pallor.  Neuro: Awake. A&Ox3.     Strength 5/5 bilateral upper and lower extremities.    No pronator drift.    Sensation intact to light touch.    No facial droop. No dysarthria.    Moderate expressive aphasia.  No receptive aphasia.    PERRL.   Psych:  Normal mood and affect. Normal speech.  Vitals reviewed.    Emergency Department Course   Imaging:  MR Brain w/o & w Contrast   Final Result   IMPRESSION:       1. Multiple intracranial metastases, with interval enlargement  of the    dominant lesion within the left frontal lobe and increased surrounding   vasogenic edema with 2 mm rightward shift of the septum pellucidum.      PADMINI DUNCAN MD            SYSTEM ID:  [id number]        Laboratory:  Labs Ordered and Resulted from Time of ED Arrival to Time of ED Departure   BASIC METABOLIC PANEL - Abnormal       Result Value    Sodium 141      Potassium 3.6      Chloride 108      Carbon Dioxide (CO2) 27      Anion Gap 6      Urea Nitrogen 20      Creatinine 0.55 (*)     Calcium 9.1      Glucose 301 (*)     GFR Estimate >90     COVID-19 VIRUS (CORONAVIRUS) BY PCR - Normal    SARS CoV2 PCR Negative     CBC WITH PLATELETS AND DIFFERENTIAL    WBC Count 7.1      RBC Count 4.55      Hemoglobin 14.2      Hematocrit 41.7      MCV 92      MCH 31.2      MCHC 34.1      RDW 13.1      Platelet Count 199      % Neutrophils 61      % Lymphocytes 28      % Monocytes 7      % Eosinophils 3      % Basophils 1      % Immature Granulocytes 0      NRBCs per 100 WBC 0      Absolute Neutrophils 4.3      Absolute Lymphocytes 2.0      Absolute Monocytes 0.5      Absolute Eosinophils 0.2      Absolute Basophils 0.0      Absolute Immature Granulocytes 0.0      Absolute NRBCs 0.0       Emergency Department Course:  Reviewed:  I reviewed nursing notes, vitals, and past medical history.    Assessments:   I obtained history and examined the patient as noted above.   1515 I rechecked the patient and explained findings.   1800 I had a long discussion with Connor and his wife about hospitalization.  He adamantly refuses and would like to leave [facility].  1820 I had a long discussion in the presence of nurse Gilliam with Connor and his wife as well as his brother, Russell, by phone regarding risks of leaving AGAINST MEDICAL ADVICE.    Consults:   1342 I was informed [facility] neurosurgery has refused to take my consult because this patient may require transfer and there is no bed availability at [facility].  1357 I spoke with Dr. Hendricks,  [facility] neurosurgeon.  1444 I spoke with Dr. Hendricks, [facility] neurosurgeon.  1455 I spoke with Olman Mcmahon neurosurgery PA, who agrees with Dr. Hendricks's plan of care and can consult on this patient at [facility].   1459 I spoke with Dr. Hendricks, [facility] neurosurgeon who requests transfer to the [place] for possible resection.  1516 I spoke with Olman Mcmahon neurosurgery PA, again.    Interventions:  1209 1L NS bolus IV  1515 10 mg Decadron IV  Ordered 20 U Lantus and 3 untils Novolog per sliding scale but patient left AMA before administration    Disposition:  Left AMA.    Impression & Plan    Medical Decision Making:  Connor is a 42-year-old man with non-small cell lung cancer with brain metastasis who is presenting with aphasia.  He has noticed this for about a week but it worsened today and he was recommended to visit the emergency department by his neurosurgeon, Dr. Moran.  He denies any other symptoms and appears generally well on exam although he does have at least moderate expressive aphasia.  He does not have any receptive aphasia or focal deficits/weakness.  He was sent for MRI of the brain with and without contrast which reveals the known intracranial metastasis with interval enlargement of the dominant left frontal lobe lesion that also has increased surrounding vasogenic edema and 2 mm rightward shift of the septum pellucidum.  Laboratory studies are significant only for hyperglycemia to 301.  This patient is a known diabetic and does not take his insulin.  There is no evidence of DKA.      I made several phone calls with Dr. Moran's team as well as Dr. Hendricks, [facility] neurosurgeon, and ultimately the plan was for transfer to the [facility] for possible resection of this tume.  Recommendation was also for steroids starting with Decadron 10 mg was given IV here.  I am concerned as he is already hyperglycemic  and corticosteroids will worsen this.  I gave him IV fluids and ordered his home dose of both Lantus and sliding scale NovoLog.  I updated him and his wife on plan for admission.  Initially Connor seemed to acquiesce to recommendation for admission.  However, as he was waiting for a bed to become available at the [facility], he then indicated he wanted to leave.  I went over the risks of leaving AGAINST MEDICAL ADVICE with Connor and his wife as well as his brother, Russell, by phone.  Connor does understand that he may not be able to see a neurosurgeon to discuss resection in a timely manner if he is discharged.  Further, he understands that he needs steroids to help with the edema and that he will have worsening hyperglycemia.  He understands that hyperglycemia can lead to DKA and untreated brain metastasis, hyperglycemia/DKA, and hypertension (which worsened while he was here - likely due to anxiety/frustration) can all lead to permanent disability or death.  I offered medications for anxiety or pain to get him to agree to admission, but he stated neither of these were the issue and that he simply wanted to go home.  Although he does have expressive aphasia, he made his wishes quite clear.  He does not have any receptive aphasia and is oriented.  He does understand this decision and accepts responsibility for it.  He and his wife understand that he should certainly return immediately if he changes his mind regarding admission or if he has worsening symptoms or new concerns.  His wife also agrees to try to arrange follow-up with neurosurgery as an outpatient as soon as possible.  He agrees to start taking his insulin at home and continue his antihypertensives.  I will continue 3 times daily 2 mg Decadron as recommended by Dr. Hendricks and he and his wife understand to take this as instructed with next dose tomorrow.  All questions answered.  Discharged at his request AGAINST MEDICAL  ADVICE.    Covid-19  Connor Emerson was evaluated during a global COVID-19 pandemic, which necessitated consideration that the patient might be at risk for infection with the SARS-CoV-2 virus that causes COVID-19.   Applicable protocols for evaluation were followed during the patient's care.     Diagnosis:    ICD-10-CM    1. Expressive aphasia  R47.01    2. Brain metastasis (H)  C79.31    3. Uncontrolled type 2 diabetes mellitus with hyperglycemia (H)  E11.65    4. Uncontrolled hypertension  I10    5. Noncompliance with medication regimen  Z91.14    6. Left against medical advice  Z53.29    7. Vasogenic brain edema (H)  G93.6        Discharge Medications:  New Prescriptions    DEXAMETHASONE (DECADRON) 2 MG TABLET    Take 1 tablet (2 mg) by mouth 3 times daily for 7 days          Andie Arboleda MD  06/11/22 1142

## 2022-06-10 NOTE — ED NOTES
[facility]  ED Nurse Handoff Report    Connor Emerson is a 42 year old male   ED Chief complaint: Altered Mental Status  . ED Diagnosis:   Final diagnoses:   None     Allergies:   Allergies   Allergen Reactions     Vicodin [Hydrocodone-Acetaminophen] Nausea and Vomiting and GI Disturbance       Code Status: Full Code  Activity level - Baseline/Home:  Independent. Activity Level - Current:   Stand by Assist. Lift room needed: No. Bariatric: No   Needed: No   Isolation: No. Infection: Not Applicable.     Vital Signs:   Vitals:    06/10/22 1308 06/10/22 1330 06/10/22 1400 06/10/22 1430   BP: (!) 150/115 (!) 155/111 (!) 163/108 (!) 163/111   Pulse: 73 79 67 66   Resp:       Temp:       TempSrc:       SpO2:       Weight:           Cardiac Rhythm:  ,      Pain level:    Patient confused: Yes. Patient Falls Risk: Yes.   Elimination Status: Has voided   Patient Report - Initial Complaint: Altered mental status. Focused Assessment:   13:30 Respiratory MM       Details:   Respiratory (Adult) - Airway WDL: .WDL except (Hx of small cell lung cancer)         13:30 Cardiac MM      Details:   Cardiac (Adult) - Cardiac WDL: .WDL except; all   Chest Pain Assessment - Associated Signs/Symptoms: hypertension          13:30 Neuro Cognitive MM      Details:   Neuro Cognitive (Adult) - Cognitive/Neuro/Behavioral WDL: .WDL except (Pt has hx of small cell lung cancer w/ mets in brain. Over the past week pt states he has been increasingly confused and has a headache. Wife states she has noticed the confusion over the past couple days. ) Level of Consciousness: confused  Arousal Level: opens eyes spontaneously  Orientation: oriented x 4  Speech: word-finding difficulty  Mood/Behavior: calm; cooperative   [place] Coma Scale - Best Eye Response: 4-->(E4) spontaneous  Best Motor Response: 6-->(M6) obeys commands  Best Verbal Response: 4-->(V4) confused  Gilbert Coma Scale Score: 14   Stroke Exam Continued - Level of  Consciousness: confused  Arousal Level: opens eyes spontaneously  Orientation: oriented x 4  Speech: word-finding difficulty            Tests Performed: Labs, imaging. Abnormal Results:   Labs Ordered and Resulted from Time of ED Arrival to Time of ED Departure   BASIC METABOLIC PANEL - Abnormal       Result Value    Sodium 141      Potassium 3.6      Chloride 108      Carbon Dioxide (CO2) 27      Anion Gap 6      Urea Nitrogen 20      Creatinine 0.55 (*)     Calcium 9.1      Glucose 301 (*)     GFR Estimate >90     COVID-19 VIRUS (CORONAVIRUS) BY PCR - Normal    SARS CoV2 PCR Negative     CBC WITH PLATELETS AND DIFFERENTIAL    WBC Count 7.1      RBC Count 4.55      Hemoglobin 14.2      Hematocrit 41.7      MCV 92      MCH 31.2      MCHC 34.1      RDW 13.1      Platelet Count 199      % Neutrophils 61      % Lymphocytes 28      % Monocytes 7      % Eosinophils 3      % Basophils 1      % Immature Granulocytes 0      NRBCs per 100 WBC 0      Absolute Neutrophils 4.3      Absolute Lymphocytes 2.0      Absolute Monocytes 0.5      Absolute Eosinophils 0.2      Absolute Basophils 0.0      Absolute Immature Granulocytes 0.0      Absolute NRBCs 0.0       MR Brain w/o & w Contrast   Final Result   IMPRESSION:       1. Multiple intracranial metastases, with interval enlargement  of the   dominant lesion within the left frontal lobe and increased surrounding   vasogenic edema with 2 mm rightward shift of the septum pellucidum.      PADMINI DUNCAN MD            SYSTEM ID:  [id number]        .   Treatments provided: See MAR. Frequent monitoring.  Family Comments: Wife present at bedside. Involved and supportive with care.  OBS brochure/video discussed/provided to patient:  No  ED Medications:   Medications   dexamethasone PF (DECADRON) injection 10 mg (has no administration in time range)   0.9% sodium chloride BOLUS (1,000 mLs Intravenous New Bag 6/10/22 1209)   gadobutrol (GADAVIST) injection 10 mL (10 mLs Intravenous Given  6/10/22 1225)     Drips infusing:  No  For the majority of the shift, the patient's behavior Green. Interventions performed were N/a.    Sepsis treatment initiated: No     Patient tested for COVID 19 prior to admission: YES    ED Nurse Name/Phone Number: Tawana Faith RN,   2:49 PM

## 2022-06-13 ENCOUNTER — TELEPHONE (OUTPATIENT)
Dept: NEUROSURGERY | Facility: CLINIC | Age: 42
End: 2022-06-13
Payer: COMMERCIAL

## 2022-06-13 NOTE — TELEPHONE ENCOUNTER
Health Call Center    Phone Message    May a detailed message be left on voicemail: yes     Reason for Call:  Diagnosis and/or Symptoms: Brain metastasis (H)     Patients wife called and states that patient was just seen in the ER on 6/10/22, and was to be transferred to the hospital. Patient left Against Medical Advice.    Patient was advised to schedule an appt with Dr. Santana.    Please call patients wife back at [phone], ASAP    Action Taken: Message routed to:  [facility] Neurosurgery    Travel Screening: Not Applicable

## 2022-06-13 NOTE — TELEPHONE ENCOUNTER
Health Call Center    Phone Message    May a detailed message be left on voicemail: yes     Reason for Call: Other: Patients spouse calling requesting to be scheduled with Dr. Santana. Patient scheduled with Dr. Moran on 8/1/2022. Dr. Moran is out of the office for 2 weeks and spouse is concerned with getting patient in sooner than 8/1/2022. Patient was told to schedule with Dr. Santana.    Please call spouse back at [phone].    Action Taken: Message routed to:  [facility] Neurology    Travel Screening: Not Applicable

## 2022-06-14 ENCOUNTER — APPOINTMENT (OUTPATIENT)
Dept: LAB | Facility: CLINIC | Age: 42
End: 2022-06-14
Payer: COMMERCIAL

## 2022-06-14 ENCOUNTER — TELEPHONE (OUTPATIENT)
Dept: FAMILY MEDICINE | Facility: CLINIC | Age: 42
End: 2022-06-14

## 2022-06-14 DIAGNOSIS — E11.65 TYPE 2 DIABETES MELLITUS WITH HYPERGLYCEMIA, WITH LONG-TERM CURRENT USE OF INSULIN (H): ICD-10-CM

## 2022-06-14 DIAGNOSIS — Z79.4 TYPE 2 DIABETES MELLITUS WITH HYPERGLYCEMIA, WITH LONG-TERM CURRENT USE OF INSULIN (H): ICD-10-CM

## 2022-06-14 LAB
BASOPHILS # BLD AUTO: 0 10E3/UL (ref 0–0.2)
BASOPHILS NFR BLD AUTO: 0 %
EOSINOPHIL # BLD AUTO: 0 10E3/UL (ref 0–0.7)
EOSINOPHIL NFR BLD AUTO: 0 %
ERYTHROCYTE [DISTWIDTH] IN BLOOD BY AUTOMATED COUNT: 12.9 % (ref 10–15)
HCT VFR BLD AUTO: 41 % (ref 40–53)
HGB BLD-MCNC: 13.6 G/DL (ref 13.3–17.7)
LYMPHOCYTES # BLD AUTO: 1.7 10E3/UL (ref 0.8–5.3)
LYMPHOCYTES NFR BLD AUTO: 14 %
MCH RBC QN AUTO: 31.3 PG (ref 26.5–33)
MCHC RBC AUTO-ENTMCNC: 33.2 G/DL (ref 31.5–36.5)
MCV RBC AUTO: 95 FL (ref 78–100)
MONOCYTES # BLD AUTO: 0.7 10E3/UL (ref 0–1.3)
MONOCYTES NFR BLD AUTO: 6 %
NEUTROPHILS # BLD AUTO: 9.6 10E3/UL (ref 1.6–8.3)
NEUTROPHILS NFR BLD AUTO: 80 %
PLATELET # BLD AUTO: 270 10E3/UL (ref 150–450)
RBC # BLD AUTO: 4.34 10E6/UL (ref 4.4–5.9)
WBC # BLD AUTO: 12 10E3/UL (ref 4–11)

## 2022-06-14 PROCEDURE — 85025 COMPLETE CBC W/AUTO DIFF WBC: CPT

## 2022-06-14 PROCEDURE — 80053 COMPREHEN METABOLIC PANEL: CPT

## 2022-06-14 PROCEDURE — 82550 ASSAY OF CK (CPK): CPT

## 2022-06-14 PROCEDURE — 36415 COLL VENOUS BLD VENIPUNCTURE: CPT

## 2022-06-14 NOTE — TELEPHONE ENCOUNTER
Spoke with Dereje, Dr Santana stated patient was going to be transferred from [facility] to [facility] but patient signed out against medical advice last week.    Today Dr Santana states patient should come to ED [facility] tomorrow, for evaluation by [facility].    Wife voices understanding.

## 2022-06-14 NOTE — TELEPHONE ENCOUNTER
Routing refill request to provider for review/approval because:  Drug not active on patient's medication list    Seen in ED 6/10/22- left AMA   Ordered 20 U Lantus and 3 untils Novolog per sliding scale but patient left AMA before administration    Routing to PCP     Joselin MODI RN

## 2022-06-14 NOTE — TELEPHONE ENCOUNTER
Patient is requesting a refill on Novolog, however its not on current med list.     Zoe Fernandez CPhT  [facility]  [address] [place] [address] [phone]

## 2022-06-15 ENCOUNTER — HOSPITAL ENCOUNTER (INPATIENT)
Facility: CLINIC | Age: 42
LOS: 2 days | Discharge: HOME | End: 2022-06-17
Attending: EMERGENCY MEDICINE | Admitting: STUDENT IN AN ORGANIZED HEALTH CARE EDUCATION/TRAINING PROGRAM
Payer: COMMERCIAL

## 2022-06-15 ENCOUNTER — TELEPHONE (OUTPATIENT)
Dept: ONCOLOGY | Facility: CLINIC | Age: 42
End: 2022-06-15
Payer: COMMERCIAL

## 2022-06-15 DIAGNOSIS — E11.65 TYPE 2 DIABETES MELLITUS WITH HYPERGLYCEMIA, WITH LONG-TERM CURRENT USE OF INSULIN (H): ICD-10-CM

## 2022-06-15 DIAGNOSIS — C79.31 BRAIN METASTASIS: Primary | ICD-10-CM

## 2022-06-15 DIAGNOSIS — C34.31 MALIGNANT NEOPLASM OF LOWER LOBE OF RIGHT LUNG (H): ICD-10-CM

## 2022-06-15 DIAGNOSIS — Z79.4 TYPE 2 DIABETES MELLITUS WITH HYPERGLYCEMIA, WITH LONG-TERM CURRENT USE OF INSULIN (H): ICD-10-CM

## 2022-06-15 DIAGNOSIS — Z20.822 CONTACT WITH AND (SUSPECTED) EXPOSURE TO COVID-19: ICD-10-CM

## 2022-06-15 DIAGNOSIS — Z85.118 PERSONAL HISTORY OF MALIGNANT NEOPLASM OF LUNG: ICD-10-CM

## 2022-06-15 DIAGNOSIS — R47.01 APHASIA: ICD-10-CM

## 2022-06-15 LAB
ALBUMIN SERPL-MCNC: 3.9 G/DL (ref 3.4–5)
ALP SERPL-CCNC: 134 U/L (ref 40–150)
ALT SERPL W P-5'-P-CCNC: 20 U/L (ref 0–70)
ANION GAP SERPL CALCULATED.3IONS-SCNC: 7 MMOL/L (ref 3–14)
ANION GAP SERPL CALCULATED.3IONS-SCNC: 8 MMOL/L (ref 3–14)
AST SERPL W P-5'-P-CCNC: 13 U/L (ref 0–45)
BASOPHILS # BLD AUTO: 0 10E3/UL (ref 0–0.2)
BASOPHILS NFR BLD AUTO: 0 %
BILIRUB SERPL-MCNC: 0.8 MG/DL (ref 0.2–1.3)
BUN SERPL-MCNC: 22 MG/DL (ref 7–30)
BUN SERPL-MCNC: 22 MG/DL (ref 7–30)
CALCIUM SERPL-MCNC: 9 MG/DL (ref 8.5–10.1)
CALCIUM SERPL-MCNC: 9.4 MG/DL (ref 8.5–10.1)
CHLORIDE BLD-SCNC: 103 MMOL/L (ref 94–109)
CHLORIDE BLD-SCNC: 106 MMOL/L (ref 94–109)
CK SERPL-CCNC: 34 U/L (ref 30–300)
CO2 SERPL-SCNC: 25 MMOL/L (ref 20–32)
CO2 SERPL-SCNC: 29 MMOL/L (ref 20–32)
CREAT SERPL-MCNC: 0.59 MG/DL (ref 0.66–1.25)
CREAT SERPL-MCNC: 0.64 MG/DL (ref 0.66–1.25)
EOSINOPHIL # BLD AUTO: 0.1 10E3/UL (ref 0–0.7)
EOSINOPHIL NFR BLD AUTO: 1 %
ERYTHROCYTE [DISTWIDTH] IN BLOOD BY AUTOMATED COUNT: 13 % (ref 10–15)
GFR SERPL CREATININE-BSD FRML MDRD: >90 ML/MIN/1.73M2
GFR SERPL CREATININE-BSD FRML MDRD: >90 ML/MIN/1.73M2
GLUCOSE BLD-MCNC: 394 MG/DL (ref 70–99)
GLUCOSE BLD-MCNC: 471 MG/DL (ref 70–99)
GLUCOSE BLDC GLUCOMTR-MCNC: 250 MG/DL (ref 70–99)
GLUCOSE BLDC GLUCOMTR-MCNC: 385 MG/DL (ref 70–99)
HCT VFR BLD AUTO: 41.7 % (ref 40–53)
HGB BLD-MCNC: 14.4 G/DL (ref 13.3–17.7)
HOLD SPECIMEN: NORMAL
IMM GRANULOCYTES # BLD: 0.1 10E3/UL
IMM GRANULOCYTES NFR BLD: 1 %
LYMPHOCYTES # BLD AUTO: 2.4 10E3/UL (ref 0.8–5.3)
LYMPHOCYTES NFR BLD AUTO: 18 %
MCH RBC QN AUTO: 31.1 PG (ref 26.5–33)
MCHC RBC AUTO-ENTMCNC: 34.5 G/DL (ref 31.5–36.5)
MCV RBC AUTO: 90 FL (ref 78–100)
MONOCYTES # BLD AUTO: 0.8 10E3/UL (ref 0–1.3)
MONOCYTES NFR BLD AUTO: 6 %
NEUTROPHILS # BLD AUTO: 10 10E3/UL (ref 1.6–8.3)
NEUTROPHILS NFR BLD AUTO: 74 %
NRBC # BLD AUTO: 0 10E3/UL
NRBC BLD AUTO-RTO: 0 /100
PLATELET # BLD AUTO: 290 10E3/UL (ref 150–450)
POTASSIUM BLD-SCNC: 3.2 MMOL/L (ref 3.4–5.3)
POTASSIUM BLD-SCNC: 3.3 MMOL/L (ref 3.4–5.3)
PROT SERPL-MCNC: 6.5 G/DL (ref 6.8–8.8)
RBC # BLD AUTO: 4.63 10E6/UL (ref 4.4–5.9)
SARS-COV-2 RNA RESP QL NAA+PROBE: NEGATIVE
SODIUM SERPL-SCNC: 139 MMOL/L (ref 133–144)
SODIUM SERPL-SCNC: 139 MMOL/L (ref 133–144)
WBC # BLD AUTO: 13.4 10E3/UL (ref 4–11)

## 2022-06-15 PROCEDURE — 36415 COLL VENOUS BLD VENIPUNCTURE: CPT | Performed by: PHYSICIAN ASSISTANT

## 2022-06-15 PROCEDURE — C9803 HOPD COVID-19 SPEC COLLECT: HCPCS | Performed by: EMERGENCY MEDICINE

## 2022-06-15 PROCEDURE — 80048 BASIC METABOLIC PNL TOTAL CA: CPT | Performed by: PHYSICIAN ASSISTANT

## 2022-06-15 PROCEDURE — 250N000013 HC RX MED GY IP 250 OP 250 PS 637: Performed by: EMERGENCY MEDICINE

## 2022-06-15 PROCEDURE — 99223 1ST HOSP IP/OBS HIGH 75: CPT | Mod: GC | Performed by: NEUROLOGICAL SURGERY

## 2022-06-15 PROCEDURE — 250N000012 HC RX MED GY IP 250 OP 636 PS 637: Performed by: EMERGENCY MEDICINE

## 2022-06-15 PROCEDURE — U0005 INFEC AGEN DETEC AMPLI PROBE: HCPCS | Performed by: EMERGENCY MEDICINE

## 2022-06-15 PROCEDURE — 99285 EMERGENCY DEPT VISIT HI MDM: CPT | Performed by: EMERGENCY MEDICINE

## 2022-06-15 PROCEDURE — 99223 1ST HOSP IP/OBS HIGH 75: CPT | Mod: AI | Performed by: STUDENT IN AN ORGANIZED HEALTH CARE EDUCATION/TRAINING PROGRAM

## 2022-06-15 PROCEDURE — 250N000011 HC RX IP 250 OP 636: Performed by: EMERGENCY MEDICINE

## 2022-06-15 PROCEDURE — 250N000012 HC RX MED GY IP 250 OP 636 PS 637: Performed by: STUDENT IN AN ORGANIZED HEALTH CARE EDUCATION/TRAINING PROGRAM

## 2022-06-15 PROCEDURE — 85025 COMPLETE CBC W/AUTO DIFF WBC: CPT | Performed by: PHYSICIAN ASSISTANT

## 2022-06-15 PROCEDURE — 120N000005 HC R&B MS OVERFLOW UMMC

## 2022-06-15 RX ORDER — INSULIN GLARGINE 100 [IU]/ML
INJECTION, SOLUTION SUBCUTANEOUS
Qty: 15 ML | Refills: 0 | OUTPATIENT
Start: 2022-06-15

## 2022-06-15 RX ORDER — NICOTINE POLACRILEX 4 MG
15-30 LOZENGE BUCCAL
Status: DISCONTINUED | OUTPATIENT
Start: 2022-06-15 | End: 2022-06-17 | Stop reason: HOSPADM

## 2022-06-15 RX ORDER — OXYCODONE HYDROCHLORIDE 5 MG/1
5 TABLET ORAL EVERY 6 HOURS PRN
Status: DISCONTINUED | OUTPATIENT
Start: 2022-06-15 | End: 2022-06-17 | Stop reason: HOSPADM

## 2022-06-15 RX ORDER — HYDROCHLOROTHIAZIDE 25 MG/1
25 TABLET ORAL DAILY
Status: DISCONTINUED | OUTPATIENT
Start: 2022-06-16 | End: 2022-06-17 | Stop reason: HOSPADM

## 2022-06-15 RX ORDER — NALOXONE HYDROCHLORIDE 0.4 MG/ML
0.4 INJECTION, SOLUTION INTRAMUSCULAR; INTRAVENOUS; SUBCUTANEOUS
Status: DISCONTINUED | OUTPATIENT
Start: 2022-06-15 | End: 2022-06-17 | Stop reason: HOSPADM

## 2022-06-15 RX ORDER — PROCHLORPERAZINE MALEATE 5 MG
10 TABLET ORAL EVERY 6 HOURS PRN
Status: DISCONTINUED | OUTPATIENT
Start: 2022-06-15 | End: 2022-06-17 | Stop reason: HOSPADM

## 2022-06-15 RX ORDER — LEVETIRACETAM 500 MG/1
1000 TABLET ORAL 2 TIMES DAILY
Status: DISCONTINUED | OUTPATIENT
Start: 2022-06-15 | End: 2022-06-17 | Stop reason: HOSPADM

## 2022-06-15 RX ORDER — LIDOCAINE 40 MG/G
CREAM TOPICAL
Status: DISCONTINUED | OUTPATIENT
Start: 2022-06-15 | End: 2022-06-17 | Stop reason: HOSPADM

## 2022-06-15 RX ORDER — DEXTROSE MONOHYDRATE 25 G/50ML
25-50 INJECTION, SOLUTION INTRAVENOUS
Status: DISCONTINUED | OUTPATIENT
Start: 2022-06-15 | End: 2022-06-17 | Stop reason: HOSPADM

## 2022-06-15 RX ORDER — NALOXONE HYDROCHLORIDE 0.4 MG/ML
0.2 INJECTION, SOLUTION INTRAMUSCULAR; INTRAVENOUS; SUBCUTANEOUS
Status: DISCONTINUED | OUTPATIENT
Start: 2022-06-15 | End: 2022-06-17 | Stop reason: HOSPADM

## 2022-06-15 RX ORDER — DEXAMETHASONE 2 MG/1
2 TABLET ORAL EVERY 8 HOURS SCHEDULED
Status: DISCONTINUED | OUTPATIENT
Start: 2022-06-15 | End: 2022-06-17

## 2022-06-15 RX ORDER — METOCLOPRAMIDE 5 MG/1
5 TABLET ORAL 3 TIMES DAILY PRN
Status: DISCONTINUED | OUTPATIENT
Start: 2022-06-15 | End: 2022-06-17 | Stop reason: HOSPADM

## 2022-06-15 RX ORDER — POTASSIUM CHLORIDE 20MEQ/15ML
20 LIQUID (ML) ORAL ONCE
Status: COMPLETED | OUTPATIENT
Start: 2022-06-15 | End: 2022-06-15

## 2022-06-15 RX ORDER — FAMOTIDINE 20 MG/1
40 TABLET, FILM COATED ORAL DAILY
Status: DISCONTINUED | OUTPATIENT
Start: 2022-06-16 | End: 2022-06-17 | Stop reason: HOSPADM

## 2022-06-15 RX ORDER — LORAZEPAM 0.5 MG/1
1 TABLET ORAL ONCE
Status: COMPLETED | OUTPATIENT
Start: 2022-06-15 | End: 2022-06-15

## 2022-06-15 RX ADMIN — POTASSIUM CHLORIDE 20 MEQ: 20 SOLUTION ORAL at 19:14

## 2022-06-15 RX ADMIN — DEXAMETHASONE 2 MG: 2 TABLET ORAL at 21:45

## 2022-06-15 RX ADMIN — INSULIN GLARGINE 20 UNITS: 100 INJECTION, SOLUTION SUBCUTANEOUS at 23:38

## 2022-06-15 RX ADMIN — INSULIN ASPART 5 UNITS: 100 INJECTION, SOLUTION INTRAVENOUS; SUBCUTANEOUS at 19:06

## 2022-06-15 RX ADMIN — LEVETIRACETAM 1000 MG: 500 TABLET, FILM COATED ORAL at 19:13

## 2022-06-15 RX ADMIN — LORAZEPAM 1 MG: 0.5 TABLET ORAL at 17:24

## 2022-06-15 ASSESSMENT — ACTIVITIES OF DAILY LIVING (ADL)
TOILETING_ISSUES: NO
ADLS_ACUITY_SCORE: 35
CHANGE_IN_FUNCTIONAL_STATUS_SINCE_ONSET_OF_CURRENT_ILLNESS/INJURY: NO
DRESSING/BATHING_DIFFICULTY: NO
HEARING_DIFFICULTY_OR_DEAF: NO
ADLS_ACUITY_SCORE: 35
FALL_HISTORY_WITHIN_LAST_SIX_MONTHS: NO
WALKING_OR_CLIMBING_STAIRS_DIFFICULTY: NO
CONCENTRATING,_REMEMBERING_OR_MAKING_DECISIONS_DIFFICULTY: NO
DIFFICULTY_EATING/SWALLOWING: NO
DIFFICULTY_COMMUNICATING: NO
DOING_ERRANDS_INDEPENDENTLY_DIFFICULTY: NO
WEAR_GLASSES_OR_BLIND: OTHER (SEE COMMENTS)
ADLS_ACUITY_SCORE: 20

## 2022-06-15 NOTE — CONSULTS
[facility]    NEUROSURGERY CONSULTATION NOTE    This consultation was requested by Dr. Montana from the Emergency service.    Reason for Consultation: L frontal lobe mass    HPI: Connor Emerson is a right-handed 42 year old male with PMHx of Diabetes mellitus type 2, HTN, and recently diagnosed Stage IV NSCLC with brain metastasis (s/p gamma knife radiation- 2/2022, followed by Dr. Moran) who presented to ED at recommendation of Neurosurgery for evaluation and possible intervention.     Patient states he had new onset of delayed speech (word finding difficulty and difficulty getting his words out) and difficulty writing for the past week and presented at [facility] (6/10). At that time, Neurosurgery team planned to transfer to [facility] however patient elected to go home. He was followed up with and advised to present to ED due to these new symptoms and recent MRI Brain demonstrating interval enlargement of left frontal lobe mass with significant vasogenic edema and mass effect. He continues to endorse word-finding and writing difficulty, but denies any new symptoms including headache, confusion, dizziness, vision changes, nausea, weakness, numbness, sensory changes, or tingling.     Patient left [place] on 2mg Decadron TID. Of note, his blood glucose levels are in 300s and 400s.       PAST MEDICAL HISTORY:   Past Medical History:   Diagnosis Date    Atypical chest pain 12/02/2013    Cancer (H)     Complication of anesthesia     Diabetes (H)     GERD (gastroesophageal reflux disease) 12/02/2013    HTN (hypertension) 05/14/2012    HTN, goal below 140/90 07/02/2013    Insomnia 02/21/2012    Mediastinal lymphadenopathy     Migraine headache 07/02/2013    Migraine with aura, without mention of intractable migraine without mention of status migrainosus     Pneumonia        PAST SURGICAL HISTORY:   Past Surgical History:   Procedure Laterality Date    BRONCHOSCOPY RIGID OR  "FLEXIBLE W/TRANSENDOSCOPIC ENDOBRONCHIAL ULTRASOUND GUIDED Bilateral 1/26/2022    Procedure: Right BRONCHOSCOPY, FIBEROPTIC, endobronchial ultrasound, pleural biopsy;  Surgeon: Dallin Agrawal MD;  Location: [facility] OR    INJECT BLOCK MEDIAL BRANCH CERVICAL/THORACIC/LUMBAR      INSERT CHEST TUBE Right 2/16/2022    Procedure: INSERTION, CATHETER, INTERCOSTAL, FOR DRAINAGE;  Surgeon: Dallin Agrawal MD;  Location: [facility] GI    INSERT CHEST TUBE Right 3/9/2022    Procedure: INSERTION, CATHETER, INTERCOSTAL, FOR DRAINAGE;  Surgeon: Sushila Antonio MD;  Location: [facility] GI    IR CHEST TUBE REMOVAL TUNNELED RIGHT  4/2/2022    ORTHOPEDIC SURGERY      Ganesh. Rotator cuff repair.    PLEUROSCOPY N/A 1/26/2022    Procedure: Pleuroscopy with Pleural Biopsy;  Surgeon: Dallin Agrawal MD;  Location: [facility] OR       FAMILY HISTORY:   Family History   Problem Relation Age of Onset    Unknown/Adopted Mother     Uterine Cancer Mother     Unknown/Adopted Father     Unknown/Adopted Maternal Grandmother     Unknown/Adopted Maternal Grandfather     Stomach Cancer Maternal Grandfather     Unknown/Adopted Paternal Grandmother     Unknown/Adopted Paternal Grandfather     Melanoma Maternal Uncle        SOCIAL HISTORY:   Social History     Tobacco Use    Smoking status: Never Smoker    Smokeless tobacco: Never Used   Substance Use Topics    Alcohol use: Yes     Alcohol/week: 0.0 standard drinks     Comment: social       MEDICATIONS:  (Not in a hospital admission)      Allergies:  Allergies   Allergen Reactions    Vicodin [Hydrocodone-Acetaminophen] Nausea and Vomiting and GI Disturbance       ROS: 10 point ROS were all negative except for pertinent positives noted in my HPI.    Physical exam:   Blood pressure (!) 157/104, pulse 61, temperature 97.6  F (36.4  C), temperature source Oral, resp. rate 16, height 1.753 m (5' 9\"), weight 99.8 kg (220 lb), SpO2 100 %.  CV: HR and BP as noted above  PULM: breathing comfortably on room air  ABD: soft, " non-distended  NEUROLOGIC:  -- Awake; Alert; oriented x 3  -- Follows commands briskly  -- +repetition, calculation, and naming  -- Speech fluent, spontaneous, delayed. Expressive aphasia with mild word finding difficulty  -- no gaze preference. No apparent hemineglect.  Cranial Nerves:  -- visual fields full to confrontation, PERRL 3-2mm bilat and brisk, extraocular movements intact  -- face symmetric (mild right nasolabial flattening), tongue midline  -- sensory V1-V3 intact bilaterally  -- palate elevates symmetrically, uvula midline  -- hearing grossly intact bilat  -- Trapezii 5/5 strength bilat symmetric  -- Cerebellar: Finger nose finger without dysmetria, intact rapid alternating motions bilaterally    Motor:  Normal bulk / tone; no tremor, rigidity, or bradykinesia.  No muscle wasting or fasciculations  Mild R pronator drift    Sensation intact to light touch in bilateral upper and lower extremities    Reflexes normal throughout    LABS:  Recent Labs   Lab 06/15/22  1533 06/14/22  1321 06/10/22  1208    139 141   POTASSIUM 3.2* 3.3* 3.6   CHLORIDE 103 106 108   CO2 29 25 27   ANIONGAP 7 8 6   * 471* 301*   BUN 22 22 20   CR 0.59* 0.64* 0.55*   TORY 9.4 9.0 9.1       Recent Labs   Lab 06/15/22  1533   WBC 13.4*   RBC 4.63   HGB 14.4   HCT 41.7   MCV 90   MCH 31.1   MCHC 34.5   RDW 13.0          IMAGING:  MRI Brain w/ and w/o 6/10:                                                              IMPRESSION:   1. Multiple intracranial metastases, with interval enlargement  of the dominant lesion within the left frontal lobe and increased surrounding vasogenic edema with 2 mm rightward shift of the septum pellucidum    ASSESSMENT:  Connor Emerson is a right-handed 42 year old male with PMHx of Diabetes mellitus type 2, HTN, and recently diagnosed Stage IV NSCLC with brain metastasis (s/p gamma knife radiation- 2/2022, followed by Dr. Moran) who presents to ED with about 1 week of expressive aphasia  with word-finding difficulty and difficulty writing and MRI Brain demonstrating interval enlargement of left frontal lobe mass with significant vasogenic edema and mass effect.     Clinically Significant Risk Factors Present on Admission                  # Compression of brain     RECOMMENDATIONS:  - Admit to medicine for management of medical comorbidities including hyperglycemia  - Consult to Oncology  - Keppra 1g BID  - Decadron 2mg TID  - Neurosurgery to order any additional imaging  - Neurosurgery will discuss timing and decision on intervention    The patient was discussed with Dr. Burnett, neurosurgery chief resident, and Dr. Banks, neurosurgery staff, and they agree with the above.    Lit Sena MD  Neurosurgery PGY-1  I have seen this patient with the resident and formulated a plan and agree with this note.  AMP

## 2022-06-15 NOTE — ED TRIAGE NOTES
Coming to be admitted for brain surgery.     Triage Assessment     Row Name 06/15/22 1453       Triage Assessment (Adult)    Airway WDL WDL       Respiratory WDL    Respiratory WDL WDL       Skin Circulation/Temperature WDL    Skin Circulation/Temperature WDL WDL       Cardiac WDL    Cardiac WDL WDL

## 2022-06-15 NOTE — ORAL ONC MGMT
Oral Chemotherapy Monitoring Program  Lab Follow Up    Reviewed lab results from 6/15/22    Assessment & Plan:  Results are concerning for blood glucose 471. Also potassium is a bit on the low ended at 3.3. He has an appointment with his primary care provider next week and will discuss with them at this time    Questions answered to patient's satisfaction.    Follow-Up:  Due for labs in 2 weeks. Patient will make appointment    Erna Rashid PharmD, [facility]  Oral Chemotherapy Monitoring Program  [facility]  [phone]  Becca 15, 2022        ORAL CHEMOTHERAPY 3/28/2022 4/1/2022 4/18/2022 5/7/2022 5/16/2022 6/6/2022 6/15/2022   Assessment Type Initial Follow up;Other Left Voicemail Lab Monitoring Chart Review Lab Monitoring;Monthly Follow up Monthly Follow up Lab Monitoring;Monthly Follow up   Diagnosis Code Non-Small Cell Lung Cancer Non-Small Cell Lung Cancer Non-Small Cell Lung Cancer Non-Small Cell Lung Cancer Non-Small Cell Lung Cancer Non-Small Cell Lung Cancer Non-Small Cell Lung Cancer   Providers Dr. Cori Persaud   [facility]   Drug Name Alecensa (alectinib) Alecensa (alectinib) Alecensa (alectinib) Alecensa (alectinib) Alecensa (alectinib) Alecensa (alectinib) Alecensa (alectinib)   Dose 450 mg 450 mg 450 mg 450 mg 450 mg 450 mg 450 mg   Current Schedule BID BID BID BID BID BID BID   Cycle Details - - Continuous Continuous Continuous Continuous Continuous   Start Date of Last Cycle 3/21/2022 - 4/11/2022 - - - -   Doses missed in last 2 weeks 0 - 0 - 0 - -   Adherence Assessment Adherent - Adherent - Adherent - -   Adverse Effects Other (See Note for Details) - Fatigue - Fatigue - -   Other (See Note for Details) Eye disturbance  - - - - - -   Pharmacist intervention(other) Yes - - - - - -   Intervention(s) Referral to oncology provider - - - - - -   Any new drug  interactions? - - - - No - -   Is the dose as ordered appropriate for the patient? - - - - Yes - -   Is the patient currently in pain? - - - - - - -   Does the patient feel the pain is currently being managed by a provider? - - - - - - -   Since the last time we talked, have you been hospitalized or used the emergency room? - - - - No - -       Labs:  _  Result Component Current Result Ref Range   Sodium 139 (6/14/2022) 133 - 144 mmol/L     _  Result Component Current Result Ref Range   Potassium 3.3 (L) (6/14/2022) 3.4 - 5.3 mmol/L     _  Result Component Current Result Ref Range   Calcium 9.0 (6/14/2022) 8.5 - 10.1 mg/dL     No results found for Mag within last 30 days.     No results found for Phos within last 30 days.     _  Result Component Current Result Ref Range   Albumin 3.9 (6/14/2022) 3.4 - 5.0 g/dL     _  Result Component Current Result Ref Range   Urea Nitrogen 22 (6/14/2022) 7 - 30 mg/dL     _  Result Component Current Result Ref Range   Creatinine 0.64 (L) (6/14/2022) 0.66 - 1.25 mg/dL     _  Result Component Current Result Ref Range   AST 13 (6/14/2022) 0 - 45 U/L     _  Result Component Current Result Ref Range   ALT 20 (6/14/2022) 0 - 70 U/L     _  Result Component Current Result Ref Range   Bilirubin Total 0.8 (6/14/2022) 0.2 - 1.3 mg/dL     _  Result Component Current Result Ref Range   WBC Count 12.0 (H) (6/14/2022) 4.0 - 11.0 10e3/uL     _  Result Component Current Result Ref Range   Hemoglobin 13.6 (6/14/2022) 13.3 - 17.7 g/dL     _  Result Component Current Result Ref Range   Platelet Count 270 (6/14/2022) 150 - 450 10e3/uL     No results found for ANC within last 30 days.     _  Result Component Current Result Ref Range   Absolute Neutrophils 9.6 (H) (6/14/2022) 1.6 - 8.3 10e3/uL

## 2022-06-15 NOTE — H&P
"[facility]    History and Physical - Hospitalist Service, GOLD TEAM        Date of Admission:  6/15/2022    Assessment & Plan      Connor Emerson is a 42 year old male admitted on 6/15/2022. He has a PMH of Stage IV NSCLC c/b mets to brain (s/p gamma knife in Feb 2022) on Alectinib, HTN, DM II, and anxiety admitted for aphasia 2/2 brain mets.    Stage IV NSCLC c/b brain mets  Follows with Dr. Persaud, currently on Alectinib, s/p gamma knife radiation in Feb 2022. Worsening lesions seen on MRI brain on 6/10 with multiple mets and 2mm right shift. Worsening expressive aphasia and exhaustive effort with neuro exam.   - Neurosurgery consulted, appreciate assistance.   - Dexamethasone 2mg q8hr   - Keppra 1g BID   - Surgical intervention plan pending   - Oncology consulted, appreciate assistance.   - Continue alectinib 450mg BID   - OT consulted   - Neuro checks     DM II  Steroid induced hyperglycemia  Worsening blood sugars given recent steroid treatments   - Lantus 20 units daily   - MDSSI    - Hypoglycemia protocol   - Continue PPI     HTN   - Continue hydrochlorothiazide       Diet:   Regular  DVT Prophylaxis: Pneumatic Compression Devices pending surgical planning  Heart Catheter: Not present  Central Lines: None  Cardiac Monitoring: None  Code Status:   FULL    Clinically Significant Risk Factors Present on Admission                  # Obesity: Estimated body mass index is 32.49 kg/m  as calculated from the following:    Height as of this encounter: 1.753 m (5' 9\").    Weight as of this encounter: 99.8 kg (220 lb).      Disposition Plan   Expected Discharge:  Pending surgical plan with NSG   Anticipated discharge location:  Awaiting care coordination huddle  Delays:            The patient's care was discussed with the Bedside Nurse, Patient and Patient's Family.    Sara Daigle MD  Hospitalist Service, GOLD TEAM   [facility]  Securely message with the [employer] Web Console (learn more here)  Text page via [facility] Paging/Directory   Please see signed in provider for up to date coverage information      ______________________________________________________________________    Chief Complaint   Worsening brain mets, expressive aphasia    History is obtained from the patient and electronic health record    History of Present Illness   Connor Emerson is a 42 year old male who has a PMH of Stage IV NSCLC c/b mets to brain (s/p gamma knife in Feb 2022) on Alectinib, HTN, DM II, and anxiety admitted for aphasia. He describes word finding difficulty over the last few weeks that was worsening in the last few days. He did present to the ED on 6/10 and was planning to be admitted however had worsening anxiety and had to leave prior to admission. Now he is continuing to have symptoms which he describes as being able to think of what he wants to say but not get out the words. He also describes feeling strained when trying to smile or talk. He is also having difficulty writing. His wife was concerned that he might have a mild droop of the right side of his face. She is also quite worried about trying to keep his blood sugar down as it has been much higher when on steroids.     He denies any headaches, vision changes, blurred vision, nausea, abdominal pain, changes in bowel or bladder. He has no changes or difficulty with gait.     Review of Systems    The 10 point Review of Systems is negative other than noted in the HPI or here.     Past Medical History    I have reviewed this patient's medical history and updated it with pertinent information if needed.   Past Medical History:   Diagnosis Date     Atypical chest pain 12/02/2013     Cancer (H)      Complication of anesthesia      Diabetes (H)      GERD (gastroesophageal reflux disease) 12/02/2013     HTN (hypertension) 05/14/2012     HTN, goal below 140/90 07/02/2013     Insomnia 02/21/2012      Mediastinal lymphadenopathy      Migraine headache 07/02/2013     Migraine with aura, without mention of intractable migraine without mention of status migrainosus      Pneumonia        Past Surgical History   I have reviewed this patient's surgical history and updated it with pertinent information if needed.  Past Surgical History:   Procedure Laterality Date     BRONCHOSCOPY RIGID OR FLEXIBLE W/TRANSENDOSCOPIC ENDOBRONCHIAL ULTRASOUND GUIDED Bilateral 1/26/2022    Procedure: Right BRONCHOSCOPY, FIBEROPTIC, endobronchial ultrasound, pleural biopsy;  Surgeon: Dallin Agrawal MD;  Location: [facility] OR     INJECT BLOCK MEDIAL BRANCH CERVICAL/THORACIC/LUMBAR       INSERT CHEST TUBE Right 2/16/2022    Procedure: INSERTION, CATHETER, INTERCOSTAL, FOR DRAINAGE;  Surgeon: Dallin Agrawal MD;  Location: [facility] GI     INSERT CHEST TUBE Right 3/9/2022    Procedure: INSERTION, CATHETER, INTERCOSTAL, FOR DRAINAGE;  Surgeon: Sushila Antonio MD;  Location: [facility] GI     IR CHEST TUBE REMOVAL TUNNELED RIGHT  4/2/2022     ORTHOPEDIC SURGERY      Ganesh. Rotator cuff repair.     PLEUROSCOPY N/A 1/26/2022    Procedure: Pleuroscopy with Pleural Biopsy;  Surgeon: Dallin Agrawal MD;  Location: [facility] OR       Social History   I have reviewed this patient's social history and updated it with pertinent information if needed.  Social History     Tobacco Use     Smoking status: Never Smoker     Smokeless tobacco: Never Used   Substance Use Topics     Alcohol use: Yes     Alcohol/week: 0.0 standard drinks     Comment: social     Drug use: No       Family History   I have reviewed this patient's family history and updated it with pertinent information if needed.  Family History   Problem Relation Age of Onset     Unknown/Adopted Mother      Uterine Cancer Mother      Unknown/Adopted Father      Unknown/Adopted Maternal Grandmother      Unknown/Adopted Maternal Grandfather      Stomach Cancer Maternal Grandfather      Unknown/Adopted Paternal Grandmother       Unknown/Adopted Paternal Grandfather      Melanoma Maternal Uncle        Prior to Admission Medications   Prior to Admission Medications   Prescriptions Last Dose Informant Patient Reported? Taking?   alcohol swab prep pads  Self No No   Sig: Use to swab area of injection/jabier as directed.   alectinib (ALECENSA) 150 MG CAPS   No No   Sig: Take 3 capsules (450 mg) by mouth 2 times daily (with meals)   blood glucose (NO BRAND SPECIFIED) lancets standard  Self No No   Sig: Use to test blood sugar 1 times daily or as directed.   blood glucose (NO BRAND SPECIFIED) test strip  Self No No   Sig: Use to test blood sugar 1 times daily or as directed.   blood glucose (NO BRAND SPECIFIED) test strip  Self No No   Sig: Use to test blood sugar 2 times daily or as directed. To accompany: Blood Glucose Monitor Brands: per insurance.   blood glucose calibration (NO BRAND SPECIFIED) solution  Self No No   Sig: To accompany: Blood Glucose Monitor Brands: per insurance.   blood glucose monitoring (NO BRAND SPECIFIED) meter device kit  Self No No   Sig: Use to test blood sugar 1 times daily or as directed.   blood glucose monitoring (NO BRAND SPECIFIED) meter device kit  Self No No   Sig: Use to test blood sugar 2 times daily or as directed. Preferred blood glucose meter OR supplies to accompany: Blood Glucose Monitor Brands: per insurance.   dexamethasone (DECADRON) 2 MG tablet   No No   Sig: Take 1 tablet (2 mg) by mouth 3 times daily for 7 days   famotidine (PEPCID) 40 MG tablet  Self Yes No   Sig: Take 40 mg by mouth daily   hydrochlorothiazide (HYDRODIURIL) 25 MG tablet   No No   Sig: Take 1 tablet (25 mg) by mouth in the morning.   insulin glargine (LANTUS PEN) 100 UNIT/ML pen   No No   Sig: Inject 20 Units Subcutaneous At Bedtime   insulin pen needle (31G X 8 MM) 31G X 8 MM miscellaneous  Self No No   Sig: Use one pen needles daily or as directed.   metoclopramide (REGLAN) 5 MG tablet  Self No No   Sig: Take 1 tablet (5 mg) by  mouth 3 times daily as needed (hiccups)   nystatin (MYCOSTATIN) 134896 UNIT/ML suspension  Self No No   Sig: Take 2 mLs (200,000 Units) by mouth 4 times daily   Patient not taking: No sig reported   oxyCODONE (ROXICODONE) 5 MG tablet   No No   Sig: Take 1 tablet (5 mg) by mouth every 6 hours as needed for pain   prochlorperazine (COMPAZINE) 10 MG tablet  Self No No   Sig: Take 1 tablet (10 mg) by mouth every 6 hours as needed (Nausea/Vomiting)   thin (NO BRAND SPECIFIED) lancets  Self No No   Sig: Use with lanceting device. To accompany: Blood Glucose Monitor Brands: per insurance.      Facility-Administered Medications: None     Allergies   Allergies   Allergen Reactions     Vicodin [Hydrocodone-Acetaminophen] Nausea and Vomiting and GI Disturbance       Physical Exam   Vital Signs: Temp: 97.6  F (36.4  C) Temp src: Oral BP: (!) 146/101 Pulse: 54   Resp: 16 SpO2: 100 % O2 Device: None (Room air)    Weight: 220 lbs 0 oz    Gen: alert, pleasant, cooperative, non-toxic  HEENT: EOMI, no conjunctival icterus, tracking appropriately, pupils equal and reactive  Resp: CTAB, no crackles or wheezes, no increased WOB  Cardiac: RRR, no S3/S4, no M/R/G appreciated  GI: soft, non-tender, non-distended  Ext: WWP, no edema, spontaneous movement in all 4  Neuro: AOx3, CN intact although unable to smile with teeth to assess any mouth droop - no facial droop appreciated. He described straining with trying to smile and was unable to raise sides of mouth even with significant focus. Also got quite fatigued and mentally exhausted with finger-nose touch but was able to do it without tremor.       Data   Data reviewed today: I reviewed all medications, new labs and imaging results over the last 24 hours. Labs and Imaging reviewed in Epic, pertinent discussed in A&P.

## 2022-06-15 NOTE — ED PROVIDER NOTES
[facility] DE[facility]MENT ([facility])  6/15/22  History     Chief Complaint   Patient presents with     admission     The history is provided by the patient and medical records.     Connor Emerson is a 42 year old male with a past medical history significant for metastatic non-small cell lung cancer w/ brain mets (s/p gamma knife radiation- 2/2022) who presents to the Emergency Department for admission to neurosurgery for metastatic brain cancer.  Patient was initially evaluated at  [facility] last week (6/10) for expressive aphasia likely thought to be secondary to brain metastases.  Patient did undergo a MRI of the brain with and without contrast which revealed no intracranial metastases with interval enlargement of the dominant left frontal lobe lesion that also has increased surrounding vasogenic edema and 2 mm rightward shift of the septum pellucidum.  Laboratory studies only significant for hyperglycemia to 301.  Plan was made for the patient to be transferred here to the [facility] for possible resection.  Recommendation was also for steroids starting with Decadron 10 mg which  he was given.  While awaiting admission, patient decided he wanted to leave [place].  He returns to the [facility] ED today for Neurosurgery evaluation and admission.  Patient reports since leaving the outside ED he has been experiencing the same symptoms with word finding difficulty and expressive aphasia.  He denies any new headaches, numbness, or weakness.  No other symptoms noted.    MRI OF THE BRAIN WITHOUT AND WITH CONTRAST 6/10/2022  IMPRESSION:   1. Multiple intracranial metastases, with interval enlargement  of the  dominant lesion within the left frontal lobe and increased surrounding  vasogenic edema with 2 mm rightward shift of the septum pellucidum.    Past Medical History  Past Medical History:   Diagnosis Date     Atypical chest pain 12/02/2013     Cancer (H)      Complication of anesthesia       Diabetes (H)      GERD (gastroesophageal reflux disease) 12/02/2013     HTN (hypertension) 05/14/2012     HTN, goal below 140/90 07/02/2013     Insomnia 02/21/2012     Mediastinal lymphadenopathy      Migraine headache 07/02/2013     Migraine with aura, without mention of intractable migraine without mention of status migrainosus      Pneumonia      Past Surgical History:   Procedure Laterality Date     BRONCHOSCOPY RIGID OR FLEXIBLE W/TRANSENDOSCOPIC ENDOBRONCHIAL ULTRASOUND GUIDED Bilateral 1/26/2022    Procedure: Right BRONCHOSCOPY, FIBEROPTIC, endobronchial ultrasound, pleural biopsy;  Surgeon: Dallin Agrawal MD;  Location: [facility] OR     INJECT BLOCK MEDIAL BRANCH CERVICAL/THORACIC/LUMBAR       INSERT CHEST TUBE Right 2/16/2022    Procedure: INSERTION, CATHETER, INTERCOSTAL, FOR DRAINAGE;  Surgeon: Dallin Agrawal MD;  Location: [facility] GI     INSERT CHEST TUBE Right 3/9/2022    Procedure: INSERTION, CATHETER, INTERCOSTAL, FOR DRAINAGE;  Surgeon: Sushila Antonio MD;  Location: [facility] GI     IR CHEST TUBE REMOVAL TUNNELED RIGHT  4/2/2022     ORTHOPEDIC SURGERY      Ganesh. Rotator cuff repair.     PLEUROSCOPY N/A 1/26/2022    Procedure: Pleuroscopy with Pleural Biopsy;  Surgeon: Dallin Agrawal MD;  Location: [facility] OR     alcohol swab prep pads  alectinib (ALECENSA) 150 MG CAPS  blood glucose (NO BRAND SPECIFIED) lancets standard  blood glucose (NO BRAND SPECIFIED) test strip  blood glucose (NO BRAND SPECIFIED) test strip  blood glucose calibration (NO BRAND SPECIFIED) solution  blood glucose monitoring (NO BRAND SPECIFIED) meter device kit  blood glucose monitoring (NO BRAND SPECIFIED) meter device kit  dexamethasone (DECADRON) 2 MG tablet  famotidine (PEPCID) 40 MG tablet  hydrochlorothiazide (HYDRODIURIL) 25 MG tablet  insulin glargine (LANTUS PEN) 100 UNIT/ML pen  insulin pen needle (31G X 8 MM) 31G X 8 MM miscellaneous  metoclopramide (REGLAN) 5 MG tablet  nystatin (MYCOSTATIN) 435355 UNIT/ML suspension  oxyCODONE  "(ROXICODONE) 5 MG tablet  prochlorperazine (COMPAZINE) 10 MG tablet  thin (NO BRAND SPECIFIED) lancets      Allergies   Allergen Reactions     Vicodin [Hydrocodone-Acetaminophen] Nausea and Vomiting and GI Disturbance     Family History  Family History   Problem Relation Age of Onset     Unknown/Adopted Mother      Uterine Cancer Mother      Unknown/Adopted Father      Unknown/Adopted Maternal Grandmother      Unknown/Adopted Maternal Grandfather      Stomach Cancer Maternal Grandfather      Unknown/Adopted Paternal Grandmother      Unknown/Adopted Paternal Grandfather      Melanoma Maternal Uncle      Social History   Social History     Tobacco Use     Smoking status: Never Smoker     Smokeless tobacco: Never Used   Substance Use Topics     Alcohol use: Yes     Alcohol/week: 0.0 standard drinks     Comment: social     Drug use: No      Past medical history, past surgical history, medications, allergies, family history, and social history were reviewed with the patient. No additional pertinent items.     I have reviewed the Medications, Allergies, Past Medical and Surgical History, and Social History in the Epic system.    Review of Systems  A complete review of systems was performed with pertinent positives and negatives noted in the HPI, and all other systems negative.    Physical Exam   BP: (!) 157/104  Pulse: 61  Temp: 97.6  F (36.4  C)  Resp: 16  Height: 175.3 cm (5' 9\")  Weight: 99.8 kg (220 lb)  SpO2: 100 %      Physical Exam  Vitals and nursing note reviewed.   Constitutional:       General: He is not in acute distress.     Appearance: He is well-developed. He is not diaphoretic.   HENT:      Head: Normocephalic and atraumatic.      Mouth/Throat:      Pharynx: No oropharyngeal exudate.   Eyes:      General: No scleral icterus.        Right eye: No discharge.         Left eye: No discharge.      Pupils: Pupils are equal, round, and reactive to light.   Cardiovascular:      Rate and Rhythm: Normal rate and " regular rhythm.      Heart sounds: Normal heart sounds. No murmur heard.    No friction rub. No gallop.   Pulmonary:      Effort: Pulmonary effort is normal. No respiratory distress.      Breath sounds: Normal breath sounds. No wheezing.   Chest:      Chest wall: No tenderness.   Abdominal:      General: Bowel sounds are normal. There is no distension.      Palpations: Abdomen is soft.      Tenderness: There is no abdominal tenderness.   Musculoskeletal:         General: No tenderness or deformity. Normal range of motion.      Cervical back: Normal range of motion and neck supple.   Skin:     General: Skin is warm and dry.      Coloration: Skin is not pale.      Findings: No erythema or rash.   Neurological:      Mental Status: He is alert and oriented to person, place, and time.      Cranial Nerves: No cranial nerve deficit.      Comments: Speech difficulty.         ED Course     At 4:08 PM the patient was seen and examined by Mahin Montana DO in Room [facility].     Procedures             Results for orders placed or performed during the hospital encounter of 06/15/22 (from the past 24 hour(s))   [place] Draw    Narrative    The following orders were created for panel order [place] Draw.  Procedure                               Abnormality         Status                     ---------                               -----------         ------                     Extra Blue Top Tube[[id number]]                              In process                 Extra Red Top Tube[[id number]]                               In process                 Extra Green Top (Lithium...[[id number]]                      In process                 Extra Purple Top Tube[[id number]]                            In process                   Please view results for these tests on the individual orders.   CBC with platelets differential    Narrative    The following orders were created for panel order CBC with platelets differential.  Procedure                                Abnormality         Status                     ---------                               -----------         ------                     CBC with platelets and d...[[id number]]  Abnormal            Final result                 Please view results for these tests on the individual orders.   Basic metabolic panel   Result Value Ref Range    Sodium 139 133 - 144 mmol/L    Potassium 3.2 (L) 3.4 - 5.3 mmol/L    Chloride 103 94 - 109 mmol/L    Carbon Dioxide (CO2) 29 20 - 32 mmol/L    Anion Gap 7 3 - 14 mmol/L    Urea Nitrogen 22 7 - 30 mg/dL    Creatinine 0.59 (L) 0.66 - 1.25 mg/dL    Calcium 9.4 8.5 - 10.1 mg/dL    Glucose 394 (H) 70 - 99 mg/dL    GFR Estimate >90 >60 mL/min/1.73m2   CBC with platelets and differential   Result Value Ref Range    WBC Count 13.4 (H) 4.0 - 11.0 10e3/uL    RBC Count 4.63 4.40 - 5.90 10e6/uL    Hemoglobin 14.4 13.3 - 17.7 g/dL    Hematocrit 41.7 40.0 - 53.0 %    MCV 90 78 - 100 fL    MCH 31.1 26.5 - 33.0 pg    MCHC 34.5 31.5 - 36.5 g/dL    RDW 13.0 10.0 - 15.0 %    Platelet Count 290 150 - 450 10e3/uL    % Neutrophils 74 %    % Lymphocytes 18 %    % Monocytes 6 %    % Eosinophils 1 %    % Basophils 0 %    % Immature Granulocytes 1 %    NRBCs per 100 WBC 0 <1 /100    Absolute Neutrophils 10.0 (H) 1.6 - 8.3 10e3/uL    Absolute Lymphocytes 2.4 0.8 - 5.3 10e3/uL    Absolute Monocytes 0.8 0.0 - 1.3 10e3/uL    Absolute Eosinophils 0.1 0.0 - 0.7 10e3/uL    Absolute Basophils 0.0 0.0 - 0.2 10e3/uL    Absolute Immature Granulocytes 0.1 <=0.4 10e3/uL    Absolute NRBCs 0.0 10e3/uL     Medications - No data to display       Assessments & Plan (with Medical Decision Making)   Is a 42-year-old male with metastatic lung cancer with known brain metastasis who presents with expressive aphasia.  Patient was seen for this with initial plan for admission for steroids and possible resection.  Patient left AMA but is returning to continue plan with treatment.  He denies any new symptoms since last  visit.  He notes some difficulty with speech, there is no other acute abnormalities on exam.  I discussed case with Neurosurgery recommends starting levetiracetam 1 g twice a day and dexamethasone 2 mg every 8 hours.  Patient was noted to be hyperglycemic and was given insulin.  Neurosurgery will follow patient as a consult and patient may need surgical intervention.  Patient will be admitted to Oncology for further monitoring work-up and treatment.    I have reviewed the nursing notes.    I have reviewed the findings, diagnosis, plan and need for follow up with the patient.    New Prescriptions    No medications on file       Final diagnoses:   None       IBernard am serving as a trained medical scribe to document services personally performed by Mahin Montana DO, based on the provider's statements to me.      Mahin MUNGUIA DO, was physically present and have reviewed and verified the accuracy of this note documented by Bernard Peña.     Mahin Montana DO  6/15/2022   [facility] EMERGENCY DEPARTMENT     Mahin Montana DO  06/15/22 2915

## 2022-06-16 ENCOUNTER — APPOINTMENT (OUTPATIENT)
Dept: OCCUPATIONAL THERAPY | Facility: CLINIC | Age: 42
End: 2022-06-16
Attending: STUDENT IN AN ORGANIZED HEALTH CARE EDUCATION/TRAINING PROGRAM
Payer: COMMERCIAL

## 2022-06-16 ENCOUNTER — APPOINTMENT (OUTPATIENT)
Dept: CT IMAGING | Facility: CLINIC | Age: 42
End: 2022-06-16
Attending: INTERNAL MEDICINE
Payer: COMMERCIAL

## 2022-06-16 LAB
ANION GAP SERPL CALCULATED.3IONS-SCNC: 7 MMOL/L (ref 3–14)
BASOPHILS # BLD AUTO: 0 10E3/UL (ref 0–0.2)
BASOPHILS NFR BLD AUTO: 0 %
BUN SERPL-MCNC: 24 MG/DL (ref 7–30)
CALCIUM SERPL-MCNC: 8.5 MG/DL (ref 8.5–10.1)
CHLORIDE BLD-SCNC: 106 MMOL/L (ref 94–109)
CO2 SERPL-SCNC: 26 MMOL/L (ref 20–32)
CREAT SERPL-MCNC: 0.56 MG/DL (ref 0.66–1.25)
EOSINOPHIL # BLD AUTO: 0.2 10E3/UL (ref 0–0.7)
EOSINOPHIL NFR BLD AUTO: 2 %
ERYTHROCYTE [DISTWIDTH] IN BLOOD BY AUTOMATED COUNT: 13 % (ref 10–15)
GFR SERPL CREATININE-BSD FRML MDRD: >90 ML/MIN/1.73M2
GLUCOSE BLD-MCNC: 274 MG/DL (ref 70–99)
GLUCOSE BLDC GLUCOMTR-MCNC: 241 MG/DL (ref 70–99)
GLUCOSE BLDC GLUCOMTR-MCNC: 247 MG/DL (ref 70–99)
GLUCOSE BLDC GLUCOMTR-MCNC: 279 MG/DL (ref 70–99)
GLUCOSE BLDC GLUCOMTR-MCNC: 299 MG/DL (ref 70–99)
GLUCOSE BLDC GLUCOMTR-MCNC: 399 MG/DL (ref 70–99)
HCT VFR BLD AUTO: 40 % (ref 40–53)
HGB BLD-MCNC: 13.5 G/DL (ref 13.3–17.7)
IMM GRANULOCYTES # BLD: 0.1 10E3/UL
IMM GRANULOCYTES NFR BLD: 1 %
LYMPHOCYTES # BLD AUTO: 2.4 10E3/UL (ref 0.8–5.3)
LYMPHOCYTES NFR BLD AUTO: 22 %
MCH RBC QN AUTO: 30.6 PG (ref 26.5–33)
MCHC RBC AUTO-ENTMCNC: 33.8 G/DL (ref 31.5–36.5)
MCV RBC AUTO: 91 FL (ref 78–100)
MONOCYTES # BLD AUTO: 0.7 10E3/UL (ref 0–1.3)
MONOCYTES NFR BLD AUTO: 6 %
NEUTROPHILS # BLD AUTO: 7.7 10E3/UL (ref 1.6–8.3)
NEUTROPHILS NFR BLD AUTO: 69 %
NRBC # BLD AUTO: 0 10E3/UL
NRBC BLD AUTO-RTO: 0 /100
PLATELET # BLD AUTO: 267 10E3/UL (ref 150–450)
POTASSIUM BLD-SCNC: 3 MMOL/L (ref 3.4–5.3)
RBC # BLD AUTO: 4.41 10E6/UL (ref 4.4–5.9)
SODIUM SERPL-SCNC: 139 MMOL/L (ref 133–144)
WBC # BLD AUTO: 11.1 10E3/UL (ref 4–11)

## 2022-06-16 PROCEDURE — 250N000013 HC RX MED GY IP 250 OP 250 PS 637: Performed by: STUDENT IN AN ORGANIZED HEALTH CARE EDUCATION/TRAINING PROGRAM

## 2022-06-16 PROCEDURE — 80048 BASIC METABOLIC PNL TOTAL CA: CPT | Performed by: STUDENT IN AN ORGANIZED HEALTH CARE EDUCATION/TRAINING PROGRAM

## 2022-06-16 PROCEDURE — 99221 1ST HOSP IP/OBS SF/LOW 40: CPT | Mod: GC | Performed by: STUDENT IN AN ORGANIZED HEALTH CARE EDUCATION/TRAINING PROGRAM

## 2022-06-16 PROCEDURE — 99233 SBSQ HOSP IP/OBS HIGH 50: CPT | Performed by: NURSE PRACTITIONER

## 2022-06-16 PROCEDURE — 74177 CT ABD & PELVIS W/CONTRAST: CPT | Mod: 26 | Performed by: RADIOLOGY

## 2022-06-16 PROCEDURE — 97535 SELF CARE MNGMENT TRAINING: CPT | Mod: GO

## 2022-06-16 PROCEDURE — 250N000013 HC RX MED GY IP 250 OP 250 PS 637: Performed by: INTERNAL MEDICINE

## 2022-06-16 PROCEDURE — 120N000005 HC R&B MS OVERFLOW UMMC

## 2022-06-16 PROCEDURE — 36415 COLL VENOUS BLD VENIPUNCTURE: CPT | Performed by: STUDENT IN AN ORGANIZED HEALTH CARE EDUCATION/TRAINING PROGRAM

## 2022-06-16 PROCEDURE — 99223 1ST HOSP IP/OBS HIGH 75: CPT | Performed by: NURSE PRACTITIONER

## 2022-06-16 PROCEDURE — 71260 CT THORAX DX C+: CPT | Mod: 26 | Performed by: RADIOLOGY

## 2022-06-16 PROCEDURE — 250N000011 HC RX IP 250 OP 636: Performed by: STUDENT IN AN ORGANIZED HEALTH CARE EDUCATION/TRAINING PROGRAM

## 2022-06-16 PROCEDURE — 99233 SBSQ HOSP IP/OBS HIGH 50: CPT | Performed by: INTERNAL MEDICINE

## 2022-06-16 PROCEDURE — 85025 COMPLETE CBC W/AUTO DIFF WBC: CPT | Performed by: STUDENT IN AN ORGANIZED HEALTH CARE EDUCATION/TRAINING PROGRAM

## 2022-06-16 PROCEDURE — 250N000011 HC RX IP 250 OP 636

## 2022-06-16 PROCEDURE — 74177 CT ABD & PELVIS W/CONTRAST: CPT

## 2022-06-16 PROCEDURE — 97530 THERAPEUTIC ACTIVITIES: CPT | Mod: GO

## 2022-06-16 PROCEDURE — 97165 OT EVAL LOW COMPLEX 30 MIN: CPT | Mod: GO

## 2022-06-16 PROCEDURE — 250N000009 HC RX 250

## 2022-06-16 RX ORDER — POTASSIUM CHLORIDE 750 MG/1
40 TABLET, EXTENDED RELEASE ORAL ONCE
Status: COMPLETED | OUTPATIENT
Start: 2022-06-16 | End: 2022-06-16

## 2022-06-16 RX ORDER — IOPAMIDOL 755 MG/ML
110 INJECTION, SOLUTION INTRAVASCULAR ONCE
Status: COMPLETED | OUTPATIENT
Start: 2022-06-16 | End: 2022-06-16

## 2022-06-16 RX ADMIN — DEXAMETHASONE 2 MG: 2 TABLET ORAL at 06:42

## 2022-06-16 RX ADMIN — POTASSIUM CHLORIDE 40 MEQ: 750 TABLET, EXTENDED RELEASE ORAL at 09:13

## 2022-06-16 RX ADMIN — HYDROCHLOROTHIAZIDE 25 MG: 25 TABLET ORAL at 07:44

## 2022-06-16 RX ADMIN — LEVETIRACETAM 1000 MG: 500 TABLET, FILM COATED ORAL at 19:46

## 2022-06-16 RX ADMIN — SODIUM CHLORIDE, PRESERVATIVE FREE 81 ML: 5 INJECTION INTRAVENOUS at 17:15

## 2022-06-16 RX ADMIN — IOPAMIDOL 110 ML: 755 INJECTION, SOLUTION INTRAVENOUS at 17:15

## 2022-06-16 RX ADMIN — LEVETIRACETAM 1000 MG: 500 TABLET, FILM COATED ORAL at 07:44

## 2022-06-16 RX ADMIN — DEXAMETHASONE 2 MG: 2 TABLET ORAL at 13:03

## 2022-06-16 RX ADMIN — DEXAMETHASONE 2 MG: 2 TABLET ORAL at 22:31

## 2022-06-16 RX ADMIN — FAMOTIDINE 40 MG: 20 TABLET ORAL at 07:44

## 2022-06-16 ASSESSMENT — ACTIVITIES OF DAILY LIVING (ADL)
ADLS_ACUITY_SCORE: 22
ADLS_ACUITY_SCORE: 20
ADLS_ACUITY_SCORE: 22
ADLS_ACUITY_SCORE: 22
ADLS_ACUITY_SCORE: 20

## 2022-06-16 NOTE — PROGRESS NOTES
[facility]    Medicine Progress Note - Hospitalist Service, GOLD TEAM 7    Date of Admission:  6/15/2022    Assessment & Plan       Connor Emerson is a 42 year old male admitted on 6/15/2022. He has a PMH of Stage IV NSCLC c/b mets to brain (s/p gamma knife in Feb 2022) on Alectinib, HTN, DM II, and anxiety admitted for aphasia 2/2 brain mets.    Changes today:  -awaiting neurosurgery plan but tentatively may have surgery over the weekend (scheduled as such due to case load)  -speech consult  -hold home chemo med per heme/onc  -increased glargine, added carb coverage, increased sliding scale intensity for hyperglycemia  -CT CAP for surveillance of cancer progression    Stage IV NSCLC c/b brain mets  Expressive aphasia- worsening  Follows with Dr. Persaud, currently on Alectinib, s/p gamma knife radiation in Feb 2022. Worsening lesions seen on MRI brain on 6/10 with multiple mets and 2mm right shift. Worsening expressive aphasia and exhaustive effort with neuro exam.   - Neurosurgery consulted, appreciate assistance.   - Dexamethasone 2mg q8hr oral   - Keppra 1g BID   - Surgical intervention plan pending   - Oncology consulted, appreciate assistance.   - Hold alectinib 450mg BID   - OT consulted   - Neuro checks  - palliative consultation for symptoms, establishing care     DM II  Steroid induced hyperglycemia  Worsening blood sugars given recent steroid treatments   - Lantus 25 units daily   - high dose SSI   - carb coverage insulin   - Hypoglycemia protocol   - Continue PPI     HTN   - Continue hydrochlorothiazide    Hypokalemia  - considered insulin deficiency but no signs of acidosis  - replete and recheck in AM       Diet: Combination Diet Regular Diet Adult    DVT Prophylaxis: Pneumatic Compression Devices given possible upcoming procedure  Heart Catheter: Not present  Central Lines: None  Cardiac Monitoring: None  Code Status: Full Code      Disposition Plan  "  Expected Discharge: 06/17/2022     Anticipated discharge location: TBD  Delays: pending surgical plan       The patient's care was discussed with the Bedside Nurse, Patient and Heme/onc, neurosurgery Consultant.    Cynthia Espinoza MD  Hospitalist Service, [address]  Securely message with the Vocera Web Console (learn more here)  Text page via [facility] Paging/Directory   Please see signed in provider for up to date coverage information      Clinically Significant Risk Factors Present on Admission                  # Obesity: Estimated body mass index is 32.49 kg/m  as calculated from the following:    Height as of this encounter: 1.753 m (5' 9\").    Weight as of this encounter: 99.8 kg (220 lb).   ______________________________________________________________________    Interval History   Connor is frustrated with the inability to express himself. Answers in short answers, has a pause before he speaks. Doesn't feel like he is slurring his words but says he is having word-finding difficulties. No fever/chills. No pain. No nausea/emesis. Endorses some anxiety Doesn't endorse insomnia. He is anxious to know the surgical recovery plan.    Data reviewed today: I reviewed all medications, new labs and imaging results over the last 24 hours. I personally reviewed no images or EKG's today.    Physical Exam   Vital Signs: Temp: 97.9  F (36.6  C) Temp src: Oral BP: (!) 143/91 Pulse: 66   Resp: 18 SpO2: 94 % O2 Device: None (Room air)    Weight: 220 lbs 0 oz  Constitutional: Awake, alert and in no distress. Laying flat in bed .  Head: Normocephalic. Atraumatic.   EENT: Conjugate gaze, nares patent, no swelling of lips/tongue  Cardiovascular:  Warm, well perfused. No edema  Respiratory: Breathing comfortably on room air  Musculoskeletal: extremities normal- no gross deformities noted and normal muscle tone  Skin: no suspicious lesions, rashes, jaundice, or cyanosis "   Neuro: no slurring, stuttering. Words are clear with normal articulation. Short responses and pauses noted prior to his reponses. Cranial nerves II-XII intact  Psychiatric:affect is frustrated    Data   Recent Labs   Lab 06/16/22  1242 06/16/22  0757 06/16/22  0424 06/15/22  1902 06/15/22  1533 06/14/22  1321   WBC  --   --  11.1*  --  13.4* 12.0*   HGB  --   --  13.5  --  14.4 13.6   MCV  --   --  91  --  90 95   PLT  --   --  267  --  290 270   NA  --   --  139  --  139 139   POTASSIUM  --   --  3.0*  --  3.2* 3.3*   CHLORIDE  --   --  106  --  103 106   CO2  --   --  26  --  29 25   BUN  --   --  24  --  22 22   CR  --   --  0.56*  --  0.59* 0.64*   ANIONGAP  --   --  7  --  7 8   TORY  --   --  8.5  --  9.4 9.0   * 241* 274*   < > 394* 471*   ALBUMIN  --   --   --   --   --  3.9   PROTTOTAL  --   --   --   --   --  6.5*   BILITOTAL  --   --   --   --   --  0.8   ALKPHOS  --   --   --   --   --  134   ALT  --   --   --   --   --  20   AST  --   --   --   --   --  13    < > = values in this interval not displayed.       Medications       [Held by provider] alectinib  450 mg Oral BID w/meals     dexamethasone  2 mg Oral Q8H [facility]     famotidine  40 mg Oral Daily     hydrochlorothiazide  25 mg Oral Daily     insulin aspart   Subcutaneous Daily with breakfast     insulin aspart   Subcutaneous Daily with lunch     insulin aspart   Subcutaneous Daily with supper     insulin aspart  1-10 Units Subcutaneous TID AC     insulin aspart  1-7 Units Subcutaneous At Bedtime     insulin glargine  25 Units Subcutaneous At Bedtime     levETIRAcetam  1,000 mg Oral BID     sodium chloride (PF)  3 mL Intracatheter Q8H

## 2022-06-16 NOTE — CONSULTS
[facility]   Palliative Care Consultation Note    Patient: Connor Emerson  Date of Admission:  6/15/2022    Requesting Clinician / Team: Hospitalist Per oncology team request  Reason for consult: Symptom management  Patient and family support    Code status: Full Code    Impression & Recommendations:  42-year-old man with recently diagnosed stage IV non-small cell lung cancer with brain metastases, status post gamma knife radiation February 2022, on alectinib (dose reduced due to bothersome side effects).  Initial presenting symptom was shortness of breath related to large right pleural effusion status post thoracenteses and Pleurx catheter (now removed).  He is now admitted with CT that showed up word finding difficulty and difficulty writing for approximately the last week.  He denies other neurological symptoms.  Brain MRI demonstrates interval enlargement of left frontal lobe mass with significant vasogenic edema and mass-effect.  Neurosurgery and oncology are consulted.  Palliative care consulted to assist with symptom management and establish care.    Past medical history also significant for DM2, GERD, HTN, migraines with aura, and insomnia.      Recommendations/symptoms/plan of care:  1.  Pain, denies any significant pain.  He has been prescribed oxycodone IR as needed as an outpatient for bilateral hip pain.  Per note review pain was quite bothersome and prevented him from sleeping.  Currently denies pain and states has used no oxycodone recently.  Of note, alectinib was reduced due to significant complaints of myalgias.  Continue as needed oxycodone.  2.  Right-sided chest pressure/tightness.  He states this is not a new symptom but is a bit worse than normal.  3.  Some discomfort with breathing, denies shortness of breath but does have sensation of noticing breathing/need to deep breathe when talking.  4.  Concern for adjustment to life-threatening disease with expectation for anxiety and mood  "disturbance.  He tells me that his mood is good. Of note, his mom  12 years ago from cancer (uterine I believe).  4.  Hyperglycemia secondary to steroids and diabetes.  5.  Complex psychosocial/financial situation.  He is the sole wage earner as his wife is a stay-at-home mom caring for a disabled daughter.  He has 4 children total.  When asked what he is thinking about, his responses \"when can I go back to work.\"  6.  Our team will try to explore symptoms more with him.  He was not very talkative on exam, somewhat withdrawn.  Frustration with word finding likely contributing.  7.  Willl arrange for outpatient palliative care.      [facility].  Not addressed today    Follow up:   1.  We will follow while inpatient and arrange for outpatient palliative care follow-up      Thank you for the opportunity to participate in the care of this patient and family. Our team: will continue to follow.     During regular M-F work hours -- if you are not sure who specifically to contact -- please contact us by calling us directly at the Palliative Care Main Line [phone]    After regular work hours and on weekends/holidays, you can leave a message at [phone]      History of Present Illness:  History gathered today from: patient, family/loved ones, medical chart  Connor Emerson is a 42 year old male with recently diagnosed stage IV non-small cell lung cancer with brain metastases, status post gamma knife radiation 2022, on alectinib (dose reduced due to bothersome side effects).  Initial presenting symptom was shortness of breath related to large right pleural effusion status post thoracenteses and Pleurx catheter (now removed).  He is now admitted with CT that showed up word finding difficulty and difficulty writing for approximately the last week.  He denies other neurological symptoms.  Brain MRI demonstrates interval enlargement of left frontal lobe mass with significant vasogenic edema and " mass-effect.  Neurosurgery and oncology are consulted.  Palliative care consulted to assist with symptom management and establish care.    Past medical history also significant for DM2, GERD, HTN, migraines with aura, and insomnia      Prognosis, Goals, & Planning:      Functional Status just prior to hospitalization: 0 (Fully active, able to carry on all activities without restriction)      Patient's decision making preferences: independently          Patient has decision-making capacity today for complex decisions: Yes            I have concerns about the patient/family's health literacy today: Yes    No           Patient has a completed Health Care Directive: Unknown or unable to assess.      Code status: Full Code    Coping, Meaning, & Spirituality:   Mood, coping, and/or meaning in the context of serious illness were addressed today: Yes  Summary/Comments: Difficult to explore this much with him.  When asked about how he is doing with coping and stress, he simply says good.  Again not sure how much word finding frustration contributes.     Palliative Symptom Data:  # Pain severity the last 12 hours: low  # Dyspnea severity the last 12 hours: low  # Nausea severity the last 12 hours: none  # Anxiety severity the last 12 hours: moderate    Patient is on opioids: assessed and bowels ok/no needed changes to plan of care today.    ROS:  Comprehensive ROS is reviewed and is negative except as here & per HPI:  Constitutional: no reported issue  Eyes: No reported issue  ENT: No reported issue  Cardiovascular: No reported issue  Respiratory: Right-sided chest tightness and notices breathing when talking  GI: Denies issue  : No reported history  MSK: No reported issue  Integumentary: Denies issue  Neurological: Word finding difficulty and/or difficulty writing, denies other neurological complaint  Psychiatric: Acknowledges complex psychosocial situation and financial stressors but overall reports mood is good     Past  Medical History:  Past Medical History:   Diagnosis Date     Atypical chest pain 12/02/2013     Cancer (H)      Complication of anesthesia      Diabetes (H)      GERD (gastroesophageal reflux disease) 12/02/2013     HTN (hypertension) 05/14/2012     HTN, goal below 140/90 07/02/2013     Insomnia 02/21/2012     Mediastinal lymphadenopathy      Migraine headache 07/02/2013     Migraine with aura, without mention of intractable migraine without mention of status migrainosus      Pneumonia         Past Surgical History:  Past Surgical History:   Procedure Laterality Date     BRONCHOSCOPY RIGID OR FLEXIBLE W/TRANSENDOSCOPIC ENDOBRONCHIAL ULTRASOUND GUIDED Bilateral 1/26/2022    Procedure: Right BRONCHOSCOPY, FIBEROPTIC, endobronchial ultrasound, pleural biopsy;  Surgeon: Dallin Agrawal MD;  Location: [facility] OR     INJECT BLOCK MEDIAL BRANCH CERVICAL/THORACIC/LUMBAR       INSERT CHEST TUBE Right 2/16/2022    Procedure: INSERTION, CATHETER, INTERCOSTAL, FOR DRAINAGE;  Surgeon: Dallin Agrawal MD;  Location: [facility] GI     INSERT CHEST TUBE Right 3/9/2022    Procedure: INSERTION, CATHETER, INTERCOSTAL, FOR DRAINAGE;  Surgeon: Sushila Antonio MD;  Location: [facility] GI     IR CHEST TUBE REMOVAL TUNNELED RIGHT  4/2/2022     ORTHOPEDIC SURGERY      Ganesh. Rotator cuff repair.     PLEUROSCOPY N/A 1/26/2022    Procedure: Pleuroscopy with Pleural Biopsy;  Surgeon: Dallin Agrawal MD;  Location: [facility] OR         Family History:  Family History   Problem Relation Age of Onset     Unknown/Adopted Mother      Uterine Cancer Mother      Unknown/Adopted Father      Unknown/Adopted Maternal Grandmother      Unknown/Adopted Maternal Grandfather      Stomach Cancer Maternal Grandfather      Unknown/Adopted Paternal Grandmother      Unknown/Adopted Paternal Grandfather      Melanoma Maternal Uncle          Social History     Socioeconomic History     Marital status:      Spouse name: Kylie     Number of children: 4     Years of education: None      Highest education level: None   Tobacco Use     Smoking status: Never Smoker     Smokeless tobacco: Never Used   Substance and Sexual Activity     Alcohol use: Yes     Alcohol/week: 0.0 standard drinks     Comment: social     Drug use: No     Sexual activity: Yes     Partners: Female   Other Topics Concern     Parent/sibling w/ CABG, MI or angioplasty before 65F 55M? No   Occasionally smoked 1 to 2 cigarettes when in college, no use since then.  Denies substance abuse.  Denies alcohol abuse.     Allergies:  Allergies   Allergen Reactions     Vicodin [Hydrocodone-Acetaminophen] Nausea and Vomiting and GI Disturbance        Medications:  I have reviewed this patient's medication profile and medications from this hospitalization.       Lab Results: personally reviewed.   Urea Nitrogen   Date Value Ref Range Status   06/16/2022 24 7 - 30 mg/dL Final   05/12/2020 20 7 - 30 mg/dL Final     Creatinine   Date Value Ref Range Status   06/16/2022 0.56 (L) 0.66 - 1.25 mg/dL Final   05/12/2020 0.66 0.66 - 1.25 mg/dL Final     AST   Date Value Ref Range Status   06/14/2022 13 0 - 45 U/L Final   08/10/2018 19 0 - 45 U/L Final     ALT   Date Value Ref Range Status   06/14/2022 20 0 - 70 U/L Final   08/10/2018 28 0 - 70 U/L Final     Alkaline Phosphatase   Date Value Ref Range Status   06/14/2022 134 40 - 150 U/L Final   08/10/2018 79 40 - 150 U/L Final     Albumin   Date Value Ref Range Status   06/14/2022 3.9 3.4 - 5.0 g/dL Final   08/10/2018 4.1 3.4 - 5.0 g/dL Final       RADIOLOGY:  XR Chest 2 Views    Result Date: 6/1/2022  CHEST TWO VIEWS 6/1/2022 8:40 AM HISTORY: Malignant neoplasm of lower lobe of right lung (H). COMPARISON: 5/2/2022. FINDINGS: Small right pleural effusion and probable mild right basilar atelectasis appears similar to the prior CT. Left lung is clear. No pneumothorax. Heart size normal.     IMPRESSION: Appearance of the chest similar to 5/2/2022.  REAL AELXANDER MD   SYSTEM ID:  [id number]    MR Brain  w/o & w Contrast    Result Date: 6/10/2022  MRI OF THE BRAIN WITHOUT AND WITH CONTRAST 6/10/2022 1:03 PM COMPARISON: 5 2022 and prior brain MRI HISTORY:  Brain mass or lesion TECHNIQUE: Multi-sequence, multi-planar MRI images of the brain were acquired before and after contrast (10 mL Gadavist). FINDINGS: Multiple intracranial metastases are again noted within the cerebellum, right temporal lobe and and the bilateral frontal lobes. Increased size of the dominant lesion within the left frontal lobe now measuring 2.4 x 2.9 cm transaxial with thick irregular peripheral enhancement and increasing surrounding vasogenic edema. Edema extends to involve the paramedian frontal lobe and the corpus callosum and there is 2 mm rightward shift of the septum pellucidum. Additional small foci of intracranial metastatic disease are not significantly changed from prior, and perhaps there is decreased vasogenic edema associated with the right temporal and left cerebellar lesions. No denny intraparenchymal hemorrhage. No extra axial fluid collection. No evidence for acute infarction.  The paranasal sinuses are clear. The temporal bone structures are clear.. No abnormal bone marrow signal. The skull base and cranium are unremarkable.. The orbits are unremarkable.. Limited evaluation of the upper cervical spine and maxillofacial structures is normal. Stable borderline enlarged cervical lymph nodes, only partially included in the field-of-view.     IMPRESSION: 1. Multiple intracranial metastases, with interval enlargement  of the dominant lesion within the left frontal lobe and increased surrounding vasogenic edema with 2 mm rightward shift of the septum pellucidum. PADMINI DUNCAN MD   SYSTEM ID:  [id number]        Physical Exam:  Temp:  [97.5  F (36.4  C)-97.9  F (36.6  C)] 97.6  F (36.4  C)  Pulse:  [54-66] 54  Resp:  [16-18] 16  BP: (124-146)/() 133/90  SpO2:  [94 %-97 %] 96 %  Wt Readings from Last 3 Encounters:   06/16/22 93.8  kg (206 lb 12.8 oz)   06/10/22 99.8 kg (220 lb)   05/04/22 98 kg (216 lb)      Alert, comfortable appearing, NAD.  Head NC, AT  Eyes anicteric without injection  Face symmetric, eyes conjugate  Mouth grossly normal, no drainage, speech fluent.  Neck grossly normal, no significant limits in ROM  Lungs mild increased work of breathing when talking, no respiratory distress  CV RR, normal rate, no edema  Abd soft, ntnd, benign  Extrems no deformities, no edema  Skin warm, dry  MSK joints of hand normal; muscle bulk and tone in the bilat UE, LE proximally & distally  Neuro face symmetric, AO, no tremor, does not talk much but is able to find appropriate words  Neuropsych seems withdrawn somewhat, agreeable and pleasant to talk with.       TTS: I have personally spent a total of 75 minutes today reviewing patient's medical record, consultation with the medical providers, and assessing patient and syptoms and providing emotional support with than 50% of this time spent in direct and indirect counseling and coordination of care re discussion with patient about symptom burden, psychosocial assessment          JERRY Woods, [facility]  Palliative Care  [phone]

## 2022-06-16 NOTE — PROGRESS NOTES
Patient admitted to: 5C  Admitted from: ED  Arrived by: Patient transport.  Reason for admission: Neurosurgery consult.  Patient accompanied by: belonging.  Belongings: Clothes, cell phone, wallet, and medications.  Teaching: Pt oriented to unit.   Skin double check to be completed in the morning.

## 2022-06-16 NOTE — PLAN OF CARE
"Goal Outcome Evaluation:  Afebrile. One episode of hypertension on admission (141/88); OVSS on RA. Up independent. Voiding well per pt report. No BM overnight; waiting for stool sample for r/o C Diff. Pt denies pain, nausea, and SOB at this time. Neuro checks completed q 4 hours; no deficits noted. Some intermittent mild aphasia; word-finding difficulty. Glucose checks ACHS. Pt currently resting in bed. Plan for neurosurgery consult today. RN skin double check will also need to be completed today.    Problem: Plan of Care - These are the overarching goals to be used throughout the patient stay.    Goal: Plan of Care Review/Shift Note  Description: The Plan of Care Review/Shift note should be completed every shift.  The Outcome Evaluation is a brief statement about your assessment that the patient is improving, declining, or no change.  This information will be displayed automatically on your shift note.  Outcome: Ongoing, Progressing  Goal: Patient-Specific Goal (Individualized)  Description: You can add care plan individualizations to a care plan. Examples of Individualization might be:  \"Parent requests to be called daily at 9am for status\", \"I have a hard time hearing out of my right ear\", or \"Do not touch me to wake me up as it startles me\".  Outcome: Ongoing, Progressing  Goal: Absence of Hospital-Acquired Illness or Injury  Outcome: Ongoing, Progressing  Intervention: Identify and Manage Fall Risk  Recent Flowsheet Documentation  Taken 6/15/2022 2300 by Alecia Arias, RN  Safety Promotion/Fall Prevention:    assistive device/personal items within reach    chemotherapeutic precautions    clutter free environment maintained    fall prevention program maintained    nonskid shoes/slippers when out of bed    patient and family education  Intervention: Prevent Skin Injury  Recent Flowsheet Documentation  Taken 6/15/2022 2300 by Alecia Arias, RN  Body Position: position changed independently  Intervention: " Prevent and Manage VTE (Venous Thromboembolism) Risk  Recent Flowsheet Documentation  Taken 6/15/2022 2300 by Alecia Arias RN  VTE Prevention/Management: SCDs (sequential compression devices) off  Activity Management: up ad avni  Goal: Optimal Comfort and Wellbeing  Outcome: Ongoing, Progressing  Goal: Readiness for Transition of Care  Outcome: Ongoing, Progressing  Intervention: Mutually Develop Transition Plan  Recent Flowsheet Documentation  Taken 6/15/2022 2234 by Alecia Arias RN  Equipment Currently Used at Home: none     Problem: Hyperosmolar Hyperglycemic State  Goal: Serum Glucose and Electrolytes Within Desired Range  Outcome: Ongoing, Progressing     Problem: Pain Acute  Goal: Acceptable Pain Control and Functional Ability  Outcome: Ongoing, Progressing  Intervention: Prevent or Manage Pain  Recent Flowsheet Documentation  Taken 6/15/2022 2300 by Alecia Arias RN  Medication Review/Management: medications reviewed     Problem: Hyperglycemia  Goal: Blood Glucose Level Within Targeted Range  Outcome: Ongoing, Progressing     Problem: Anemia (Chemotherapy Effects)  Goal: Anemia Symptom Improvement  Outcome: Ongoing, Progressing  Intervention: Monitor and Manage Anemia  Recent Flowsheet Documentation  Taken 6/15/2022 2300 by Alecia Arias RN  Safety Promotion/Fall Prevention:    assistive device/personal items within reach    chemotherapeutic precautions    clutter free environment maintained    fall prevention program maintained    nonskid shoes/slippers when out of bed    patient and family education     Problem: Urinary Bleeding Risk or Actual (Chemotherapy Effects)  Goal: Absence of Hematuria  Outcome: Ongoing, Progressing     Problem: Nausea and Vomiting (Chemotherapy Effects)  Goal: Fluid and Electrolyte Balance  Outcome: Ongoing, Progressing  Intervention: Prevent and Manage Nausea and Vomiting  Recent Flowsheet Documentation  Taken 6/15/2022 2300 by Alecia Arias RN  Oral  Care: oral rinse provided     Problem: Neurotoxicity (Chemotherapy Effects)  Goal: Neurotoxicity Symptom Control  Outcome: Ongoing, Progressing     Problem: Neutropenia (Chemotherapy Effects)  Goal: Absence of Infection  Outcome: Ongoing, Progressing  Intervention: Prevent Infection and Maximize Resistance  Recent Flowsheet Documentation  Taken 6/15/2022 2300 by Alecia Arias, RN  Oral Care: oral rinse provided     Problem: Oral Mucositis (Chemotherapy Effects)  Goal: Improved Oral Mucous Membrane Integrity  Outcome: Ongoing, Progressing  Intervention: Promote Oral Comfort and Health  Recent Flowsheet Documentation  Taken 6/15/2022 2300 by Alecia Arias, RN  Oral Care: oral rinse provided     Problem: Thrombocytopenia Bleeding Risk (Chemotherapy Effects)  Goal: Absence of Bleeding  Outcome: Ongoing, Progressing     Problem: Fatigue  Goal: Improved Activity Tolerance  Outcome: Ongoing, Progressing  Intervention: Promote Improved Energy  Recent Flowsheet Documentation  Taken 6/15/2022 2300 by Alecia Arias, RN  Activity Management: up ad avni

## 2022-06-16 NOTE — PROGRESS NOTES
06/16/22 0948   Quick Adds   Type of Visit Initial Occupational Therapy Evaluation   Living Environment   People in Home spouse   Current Living Arrangements apartment   Living Environment Comments Pt reports living in an apartment with family at baseline. Pt reports no stairs to enter apartment.   Self-Care   Usual Activity Tolerance moderate   Current Activity Tolerance moderate   Equipment Currently Used at Home none   Activity/Exercise/Self-Care Comment Pt reports IND with basic ADL prior to arrival. Declines balance issues at this time.   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 Pt expresses difficulty texting.   General Information   Onset of Illness/Injury or Date of Surgery 06/15/22   Referring Physician Sara Daigle MD   Patient/Family Therapy Goal Statement (OT) not stated   Additional Occupational Profile Info/Pertinent History of Current Problem Connor Emerson is a right-handed 42 year old male with PMHx of Diabetes mellitus type 2, HTN, and recently diagnosed Stage IV NSCLC with brain metastasis (s/p gamma knife radiation- 2/2022, followed by Dr. Moran) who presented to ED at recommendation of Neurosurgery for evaluation and possible intervention. Patient states he had new onset of delayed speech (word finding difficulty and difficulty getting his words out) and difficulty writing for the past week and presented at [facility] (6/10). He continues to endorse word-finding and writing difficulty, but denies any new symptoms including headache, confusion, dizziness, vision changes, nausea, weakness, numbness, sensory changes, or tingling.   General Observations and Info pt supine in bed upon arrival, agreeable to therapy   Cognitive Status Examination   Orientation Status orientation to person, place and time  "  Follows Commands increased processing time needed;delayed response/completion   Cognitive Status Comments Pt reports difficulty with word finding and reports slowed response time. Endorses difficulty texting.   Visual Perception   Impact of Vision Impairment on Function (Vision) Pt denies vision changes   Sensory   Sensory Quick Adds No deficits were identified   Pain Assessment   Patient Currently in Pain No   Integumentary/Edema   Integumentary/Edema no deficits were identifed   Range of Motion Comprehensive   General Range of Motion no range of motion deficits identified   Strength Comprehensive (MMT)   General Manual Muscle Testing (MMT) Assessment no strength deficits identified   Bed Mobility   Bed Mobility No deficits identified   Transfers   Transfers No deficits identified   Clinical Impression   Criteria for Skilled Therapeutic Interventions Met (OT) Yes, treatment indicated   OT Diagnosis OT order for \"worsening aphasia and writing skills due to brain mets, please assess for accomodative therapies\"   Influenced by the following impairments cognition, aphasia, home mgmt   OT Problem List-Impairments impacting ADL problems related to;cognition;communication   Assessment of Occupational Performance 1-3 Performance Deficits   Identified Performance Deficits home mgmt   Planned Therapy Interventions (OT) IADL retraining;cognition;progressive activity/exercise;risk factor education   Clinical Decision Making Complexity (OT) low complexity   Anticipated Equipment Needs Upon Discharge (OT)   (TBD with further therapy)   Risk & Benefits of therapy have been explained patient;evaluation/treatment results reviewed   OT Discharge Planning   OT Discharge Recommendation (DC Rec) home with outpatient occupational therapy   OT Rationale for DC Rec Pending medical POC and LOS, at this time anticipate pt would be able to discharge to home with OP SLP and OP OT follow up for higher level skills (aphasia and cognition). " Would also recommend driving safety eval.   OT Brief overview of current status Up IND in room, aphasia noted, pt reports slowed processing   Total Evaluation Time (Minutes)   Total Evaluation Time (Minutes) 6   OT Goals   Therapy Frequency (OT) 2 times/wk   OT Predicted Duration/Target Date for Goal Attainment 06/24/22   OT Goals Cognition;Meal Preparation   OT: Meal Preparation Supervision/stand-by assist   OT: Cognitive Patient/caregiver will verbalize understanding of cognitive assessment results/recommendations as needed for safe discharge planning

## 2022-06-16 NOTE — PLAN OF CARE
Shift:   VS: Temp: 97.6  F (36.4  C) Temp src: Oral BP: (!) 133/90 Pulse: 54   Resp: 16 SpO2: 96 % O2 Device: None (Room air)    Pain: Denies  Neuro: A&OX4, calls appropriately. Neuros intact except some word finding difficulties.   Cardiac:   Denies chest pain/palpitations  Respiratory: RA, denies SOB  GI/Diet/Appetite: Good oral intake, no nausea/emesis. ACHS, BG in 200s, on both sliding scales and carb coverage.   :  Adequate UO, no BM this shift  LDA's: PIV SL  Skin: No new deficit noted  Activity: Up ad avni  Tests/Procedures:   Pertinent Labs/Lab Collection:      Plan: Palliative and speech consult. Neuro surgery and Heme/onc following. Continue with cares and update team with any changes.

## 2022-06-16 NOTE — PROGRESS NOTES
"[facility], [place]   Neurosurgery Progress Note:    Date of service: 6/16/2022    Assessment: Connor Emerson is a right-handed 42 year old male with PMHx of Diabetes mellitus type 2, HTN, and recently diagnosed Stage IV NSCLC with brain metastasis (s/p gamma knife radiation- 2/2022, followed by Dr. Moran) who presented to ED at recommendation of Neurosurgery for evaluation and possible intervention.     He reports that approximately 1 week ago he experienced new onset of delayed speech (word finding difficulty and difficulty getting his words out) and difficulty writing. Recent MRI Brain demonstrated interval enlargement of left frontal lobe mass with significant vasogenic edema and mass effect. Upon admission he continues to endorse word-finding and writing difficulty, but denies any new symptoms including headache, confusion, dizziness, vision changes, nausea, weakness, numbness, sensory changes, or tingling.     Clinically Significant Risk Factors Present on Admission                   # Obesity: Estimated body mass index is 30.54 kg/m  as calculated from the following:    Height as of this encounter: 1.753 m (5' 9\").    Weight as of this encounter: 93.8 kg (206 lb 12.8 oz).   # Compression of brain  # HTN  # Stage IV NSCLC with brain metastasis      Objective:   Temp:  [97.5  F (36.4  C)-97.9  F (36.6  C)] 97.9  F (36.6  C)  Pulse:  [54-66] 66  Resp:  [16-18] 18  BP: (124-157)/() 143/91  SpO2:  [94 %-100 %] 94 %  No intake/output data recorded.    Gen: Appears comfortable, NAD  Neurologic:  - Alert & Oriented to person, place, time, and situation  - Follows commands briskly  - Speech fluent, spontaneous. Delayed. Expressive aphasia with mild word finding difficulty  - No gaze preference. No apparent hemineglect.  - PERRL, EOMI  - Strong eye closure, jaw clench, and cheek puff  - Face symmetric with sensation intact to light touch  - Palate elevates symmetrically, uvula midline, " tongue protrudes midline  - Trapezii and sternocleidomastoid muscles 5/5 bilaterally  - No pronator drift noted today     Del Tr Bi WE WF Gr   R 5 5 5 5 5 5   L 5 5 5 5 5 5    HF KE KF DF PF EHL   R 5 5 5 5 5 5   L 5 5 5 5 5 5     Interval History:  He denies headache or vision changes.  He continues to have delayed speech but is able to answer questions appropriately. No pronator drift noted today and strength is 5/5 in all extremities.     Plan:  - Serial neuro checks  - SBP <160  - Appreciate Oncology Consult  - Keppra 1g BID  - Decadron 2mg TID  - Neurosurgery to order any additional imaging  - Neurosurgery will discuss timing and decision on surgical intervention    Niranjan Kruger, APRN, CNP  Neurosurgery  Pager [phone]      I have spent a total of 25 minutes total time counseling, coordination, and chart review, over 50% of the time was spent in direct patient care.     LABS  Recent Labs   Lab Test 06/16/22  0424 06/15/22  1533 06/14/22  1321   WBC 11.1* 13.4* 12.0*   HGB 13.5 14.4 13.6   MCV 91 90 95    290 270       Recent Labs   Lab Test 06/16/22  0757 06/16/22  0424 06/15/22  2147 06/15/22  1902 06/15/22  1533 06/14/22  1321   NA  --  139  --   --  139 139   POTASSIUM  --  3.0*  --   --  3.2* 3.3*   CHLORIDE  --  106  --   --  103 106   CO2  --  26  --   --  29 25   BUN  --  24  --   --  22 22   CR  --  0.56*  --   --  0.59* 0.64*   ANIONGAP  --  7  --   --  7 8   TORY  --  8.5  --   --  9.4 9.0   * 274* 385*   < > 394* 471*    < > = values in this interval not displayed.       IMAGING    No results found for this or any previous visit (from the past 24 hour(s)).

## 2022-06-16 NOTE — CONSULTS
Oncology  Consult Note   Date of Service: 06/16/2022    Patient: Connor Emerson  MRN: [id number]  Admission Date: 6/15/2022  Hospital Day # 1    Reason for Consult: NSCLC with brain mets admitted for possible neurosurgical intervention and steroids. Please assist with if needs to continue chemotherapy and ongoing treatment planning given worsening of brain mets.    History of Present Illness:    Connor Emerson is a 42 year old male with a history of Stage IV NSCLC c/b mets to brain (s/p gamma knife in Feb 2022) on Alectinib, DM II (worsened by steroids), and anxiety who was presented for aphasia. He had word finding difficulty over the last few weeks that was worsening in the last few days. He presented to [facility] ED on 6/10 for evaluation of his symptoms. During that visit, he had an MRI brain which showed multiple intracranial metastases, with interval enlargement of the dominant lesion within the left frontal lobe and increased surrounding vasogenic edema with 2 mm rightward shift of the septum pellucidum. Due to his worsening anxiety, he left against medical advice. His symptoms continued to progress to where he could not even write at work so he decided to go to the ED for evaluation and treatment. Neurosurgery was consulted who started him on keppra and decadron 2 mg TID given his hyperglycemia.     Oncologic History:  Diagnosis:   Stage IV NSCLC, Rt lung adenocarcinoma with metastasis to pleura, mediastinum , rt pleural effusion and brain diagnosed 1/2022 (AJCC 8th edition)  PD-L1 TPS 2-3% by Luis Lopez   NGS [facility] panel-EML4:ALK rearragement  NGS Guardant- GNAS R201H, KRAS K5E- No ALK     Treatment:   Current:  3/2/22- now- Alectinib 600 mg BID (Dose reduced to 450 mg BID due to grade 3 myalgias 3/21/22)  Past:  2/15/22- GK to 11 briain lesions     Intent of treatment: Palliative     Oncologic course:  1/19/22 to 1/22/22-Admitted to [facility] for 2 week progressive SOB secondary to have large rt sided pleural  effusion, needing thoracentesis x2 (1.7L and 2.0 L removed), cytology positive for malignancy, adenocarcinoma.   1/26/22- Rt pleural mass biopsy-Dr. Agrawal--POSITIVE FOR ADENOCARCINOMA CONSISTENT WITH LUNG PRIMARY, admixed with mesothelial hyperplasia and inflammatory infiltrate (+ TTF-1 and CK 7;  negative p40, calretinin and WT-1. PAX8 immunostain focal +). 4th thoracentesis done simultaneously - 3L approx removed.   2/1/22- PET/CT-Right lower lobe central infiltrative FDG avid 8.2 x 9.6 cm mass representing a primary lung adenocarcinoma. Ipsilateral right perihilar, bilateral pretracheal, subcarinal and superior mediastinal michele metastases. Contralateral mildly FDG avid few lung nodules are suspicious for contralateral metastasis. At least 3 intracranial metastases in the right frontal lobe, left frontal lobe and left cerebellar hemisphere. Nonspecific mild diffuse bone marrow uptake. Further evaluation with a spine MRI could be considered to rule out early marrow infiltration. This could also be seen with red marrow conversion.  2/5/22-  Brain MRI- At least 9 intracranial metastases as detailed above. The dominant lesions involving the orbital right frontal lobe, the posterior left middle frontal gyrus, anterior right temporal lobe and in the left cerebellar hemisphere have surrounding moderate vasogenic type edema.  2/15/22- Saw Dr. Arango from Rad Onc- Rcd GK to 12 lesion in bran  2/16/22- Pleurex placement   3/2/22- Started Alectinib 600 mg BID  3/21/22- Dose reduced to 450 mg BID due to grade 3 myalgias and fatigue  4/2/22 to 4/5/22- Admitted at [facility] for- Severe sepsis due to MSSA infection of right PleurX catheter s/p removal- He presented with onset of pain at tube site starting 4/1; at arrival was tachycardic with leukocytosis (22.7) and elevated lactic acid (2.9).  CT chest showed fluid and stranding tracking outside the pleural space into chest wall along pleural catheter.  IR was consulted and  removed catheter 4/2 with report of pustular drainage and tip culture growing MSSA.  Thoracic Surg was consulted who felt no surgical indication necessary given minimal pleural fluid and lack of any signs of abscess.  Initially treated with broad spectrum coverage for sepsis, narrowed to Ancef once sensitivities returned with plan to transition to cefadroxil for an additional 10 days at discharge per ID. Held drug 4/2 to 4/11 5/2/22- CT CAP- Overall, positive response to therapy with decreased size of right lower lobe and right pleural-based masses, pulmonary metastases, hilar and mediastinal lymphadenopathy. However, a single right posterior pleural-based mass has slightly increased in size since 2/24/2022. No metastatic disease in the abdomen and pelvis. Right Pleurx catheter has been removed. Trace right pleural effusion and right basilar atelectasis.  5/2/22- Brain MRI- The previously demonstrated brain metastases are mildly diminished in size versus to 2/5/2022. The degree of edema is also diminished but not completely resolved. Probable trace amounts of intralesional bleeding demonstrated on the gradient sequence within the metastases. No definite new metastasis or progressive mass effect. No hydrocephalus or infarct.     Review of Systems: A comprehensive ROS was performed and found to be negative or non-contributory with the exception of that noted in the HPI above.    Outpatient Medications reviewed.    Physical Exam:    Temp:  [97.5  F (36.4  C)-97.9  F (36.6  C)] 97.9  F (36.6  C)  Pulse:  [54-66] 66  Resp:  [16-18] 18  BP: (124-157)/() 143/91  SpO2:  [94 %-100 %] 94 %     General:  Appeared comfortable   HEENT: NCAT, pupils equal and round, EOMI, no scleral icterus  Lungs: Breathing comfortably on room air  Heart: Regular rate and rhythm  Abdomen: Soft and nondistended  Extremities: Without edema  Neuro: Alert and oriented, speech delayed with expressive aphasia and word finding difficulty      Labs:  CMPRecent Labs   Lab 06/16/22  0424 06/15/22  1533 06/14/22  1321 06/10/22  1208    139 139 141   POTASSIUM 3.0* 3.2* 3.3* 3.6   CHLORIDE 106 103 106 108   CO2 26 29 25 27   ANIONGAP 7 7 8 6   BUN 24 22 22 20   CR 0.56* 0.59* 0.64* 0.55*   GFRESTIMATED >90 >90 >90 >90   TORY 8.5 9.4 9.0 9.1   PROTTOTAL  --   --  6.5*  --    ALBUMIN  --   --  3.9  --    BILITOTAL  --   --  0.8  --    ALKPHOS  --   --  134  --    AST  --   --  13  --    ALT  --   --  20  --      Recent Labs   Lab 06/16/22  0757 06/16/22  0424 06/15/22  2147 06/15/22  1902 06/15/22  1533   * 274* 385* 250* 394*     CBC  Recent Labs   Lab 06/16/22 0424 06/15/22  1533 06/14/22  1321 06/10/22  1208   WBC 11.1* 13.4* 12.0* 7.1   RBC 4.41 4.63 4.34* 4.55   HGB 13.5 14.4 13.6 14.2   HCT 40.0 41.7 41.0 41.7   MCV 91 90 95 92   MCH 30.6 31.1 31.3 31.2   MCHC 33.8 34.5 33.2 34.1   RDW 13.0 13.0 12.9 13.1    290 270 199     COAGSNo lab results found in last 7 days.    Invalid input(s): FIBRINOGEN     Imaging:  As noted in HPI    Pathology:  No new pathology    Assessment and Plan:  Connor Emerson is a 42 year old male with a history of Stage IV NSCLC c/b mets to brain (s/p gamma knife in Feb 2022) on Alectinib, DM II (worsened by steroids), and anxiety who presented with aphasia and found to have vasogenic edema and enlargement of left frontal lobe lesion.     #Stage IV NSCLC, mets to brain (s/p gamma knife in Feb 2022)   Mr. Emerson was diagnosed with Stage IV NSCLC with mets to the brain in 02/2022. He underwent gamma knife with radiation oncology and was then started on Alectinib 3/2/2022 for positive EML4:ALK rearrangement. He now presents with enlargement of the frontal lobe lesion concerning for progression on Alectinib as evident on MRI brain on 6/10. Given the progression, we would hold the Alectinib at this time. Because there is a new lesion present, it would be beneficial to obtain a CT CAP for restaging to help with future  planning. It would also be of benefit to have radiation oncology determine if radiation would be of any benefit in this scenario.     Neurologic findings also include extensive vasogenic edema which is currently treated with steroids. His case is complicated by the fact that he will likely need higher steroid dose but he has significant hyperglycemia. He will need better glucose control as this can also attribute to the edema. Once the glucose is under control, there may be more room to increase the steroid dose. We will touch base with Dr. Persaud as well to discuss further inpatient care.    Recommendations:   - Hold Alectinib  - Cont Keppra 1g BID  - Cont Decadron 2mg TID  - Maintain blood glucose levels <200  - CT CAP for restaging  - Palliative consult for symptom management  - Rad/onc consult for possible radiation  - Endocrine consult for glucose control if no improvement within next few days    Patient was seen and plan of care was discussed with attending physician Dr. Nadir Guzmán.    The oncology service will continue to follow with you. Please do not hesitate to page with questions or concerns.     Valerie Torres MD   Heme/Onc/Transplant Fellow  Pgr #[phone]      Admission Reconciliation is Completed  Discharge Reconciliation is Not Complete Admission Reconciliation is Completed  Discharge Reconciliation is Completed

## 2022-06-16 NOTE — PLAN OF CARE
"/85 (BP Location: Left arm)   Pulse 56   Temp 98.5  F (36.9  C) (Oral)   Resp 16   Ht 1.753 m (5' 9\")   Wt 93.8 kg (206 lb 12.8 oz)   SpO2 97%   BMI 30.54 kg/m      VSS. Q4 hour neuro checks intact - slight difficulty with word finding at times. Blood sugars ACHS with sliding scale and carb coverage. Patient pre-dinner blood sugar was 399, please follow up with bedtime coverage and page provider if blood sugar is still elevated. Up independent in the room. Patient will have surgery this Saturday or Sunday. CT scan of chest/abdomen/pelvis completed this shift. SOB on exertion, but on room air. Denies pain, nausea, diarrhea. Still needs a stool sample.     Goal Outcome Evaluation:        Problem: Plan of Care - These are the overarching goals to be used throughout the patient stay.    Goal: Plan of Care Review/Shift Note  Description: The Plan of Care Review/Shift note should be completed every shift.  The Outcome Evaluation is a brief statement about your assessment that the patient is improving, declining, or no change.  This information will be displayed automatically on your shift note.  Outcome: Ongoing, Progressing  Goal: Patient-Specific Goal (Individualized)  Description: You can add care plan individualizations to a care plan. Examples of Individualization might be:  \"Parent requests to be called daily at 9am for status\", \"I have a hard time hearing out of my right ear\", or \"Do not touch me to wake me up as it startles me\".  Outcome: Ongoing, Progressing  Goal: Absence of Hospital-Acquired Illness or Injury  Outcome: Ongoing, Progressing  Intervention: Identify and Manage Fall Risk  Recent Flowsheet Documentation  Taken 6/16/2022 1600 by Loli Marinelli, RN  Safety Promotion/Fall Prevention:   assistive device/personal items within reach   chemotherapeutic precautions   clutter free environment maintained   fall prevention program maintained   nonskid shoes/slippers when out of bed   patient and " family education  Intervention: Prevent and Manage VTE (Venous Thromboembolism) Risk  Recent Flowsheet Documentation  Taken 6/16/2022 1600 by Loli Marinelli RN  Activity Management: up ad avni  Goal: Optimal Comfort and Wellbeing  Outcome: Ongoing, Progressing  Goal: Readiness for Transition of Care  Outcome: Ongoing, Progressing     Problem: Hyperosmolar Hyperglycemic State  Goal: Serum Glucose and Electrolytes Within Desired Range  Outcome: Ongoing, Progressing     Problem: Pain Acute  Goal: Acceptable Pain Control and Functional Ability  Outcome: Ongoing, Progressing  Intervention: Prevent or Manage Pain  Recent Flowsheet Documentation  Taken 6/16/2022 1600 by Loli Marinelli RN  Medication Review/Management: medications reviewed     Problem: Hyperglycemia  Goal: Blood Glucose Level Within Targeted Range  Outcome: Ongoing, Progressing     Problem: Anemia (Chemotherapy Effects)  Goal: Anemia Symptom Improvement  Outcome: Ongoing, Progressing  Intervention: Monitor and Manage Anemia  Recent Flowsheet Documentation  Taken 6/16/2022 1600 by Loli Marinelli RN  Safety Promotion/Fall Prevention:   assistive device/personal items within reach   chemotherapeutic precautions   clutter free environment maintained   fall prevention program maintained   nonskid shoes/slippers when out of bed   patient and family education     Problem: Urinary Bleeding Risk or Actual (Chemotherapy Effects)  Goal: Absence of Hematuria  Outcome: Ongoing, Progressing     Problem: Nausea and Vomiting (Chemotherapy Effects)  Goal: Fluid and Electrolyte Balance  Outcome: Ongoing, Progressing     Problem: Neurotoxicity (Chemotherapy Effects)  Goal: Neurotoxicity Symptom Control  Outcome: Ongoing, Progressing     Problem: Neutropenia (Chemotherapy Effects)  Goal: Absence of Infection  Outcome: Ongoing, Progressing     Problem: Oral Mucositis (Chemotherapy Effects)  Goal: Improved Oral Mucous Membrane Integrity  Outcome: Ongoing, Progressing      Problem: Thrombocytopenia Bleeding Risk (Chemotherapy Effects)  Goal: Absence of Bleeding  Outcome: Ongoing, Progressing     Problem: Fatigue  Goal: Improved Activity Tolerance  Outcome: Ongoing, Progressing  Intervention: Promote Improved Energy  Recent Flowsheet Documentation  Taken 6/16/2022 1600 by Loli Marinelli RN  Activity Management: up ad avni

## 2022-06-17 ENCOUNTER — APPOINTMENT (OUTPATIENT)
Dept: SPEECH THERAPY | Facility: CLINIC | Age: 42
End: 2022-06-17
Attending: INTERNAL MEDICINE
Payer: COMMERCIAL

## 2022-06-17 VITALS
OXYGEN SATURATION: 97 % | TEMPERATURE: 98.4 F | SYSTOLIC BLOOD PRESSURE: 135 MMHG | RESPIRATION RATE: 16 BRPM | BODY MASS INDEX: 30.17 KG/M2 | WEIGHT: 203.7 LBS | HEART RATE: 49 BPM | DIASTOLIC BLOOD PRESSURE: 97 MMHG | HEIGHT: 69 IN

## 2022-06-17 DIAGNOSIS — C34.90 NON-SMALL CELL LUNG CANCER, UNSPECIFIED LATERALITY (H): Primary | ICD-10-CM

## 2022-06-17 LAB
ANION GAP SERPL CALCULATED.3IONS-SCNC: 7 MMOL/L (ref 3–14)
BASOPHILS # BLD AUTO: 0 10E3/UL (ref 0–0.2)
BASOPHILS NFR BLD AUTO: 0 %
BUN SERPL-MCNC: 20 MG/DL (ref 7–30)
CALCIUM SERPL-MCNC: 8.9 MG/DL (ref 8.5–10.1)
CHLORIDE BLD-SCNC: 105 MMOL/L (ref 94–109)
CO2 SERPL-SCNC: 29 MMOL/L (ref 20–32)
CREAT SERPL-MCNC: 0.56 MG/DL (ref 0.66–1.25)
EOSINOPHIL # BLD AUTO: 0.1 10E3/UL (ref 0–0.7)
EOSINOPHIL NFR BLD AUTO: 1 %
ERYTHROCYTE [DISTWIDTH] IN BLOOD BY AUTOMATED COUNT: 13.2 % (ref 10–15)
GFR SERPL CREATININE-BSD FRML MDRD: >90 ML/MIN/1.73M2
GLUCOSE BLD-MCNC: 214 MG/DL (ref 70–99)
GLUCOSE BLDC GLUCOMTR-MCNC: 237 MG/DL (ref 70–99)
HCT VFR BLD AUTO: 43 % (ref 40–53)
HGB BLD-MCNC: 14.9 G/DL (ref 13.3–17.7)
IMM GRANULOCYTES # BLD: 0.1 10E3/UL
IMM GRANULOCYTES NFR BLD: 1 %
LYMPHOCYTES # BLD AUTO: 2.6 10E3/UL (ref 0.8–5.3)
LYMPHOCYTES NFR BLD AUTO: 18 %
MCH RBC QN AUTO: 31.4 PG (ref 26.5–33)
MCHC RBC AUTO-ENTMCNC: 34.7 G/DL (ref 31.5–36.5)
MCV RBC AUTO: 91 FL (ref 78–100)
MONOCYTES # BLD AUTO: 0.8 10E3/UL (ref 0–1.3)
MONOCYTES NFR BLD AUTO: 6 %
NEUTROPHILS # BLD AUTO: 10.5 10E3/UL (ref 1.6–8.3)
NEUTROPHILS NFR BLD AUTO: 74 %
NRBC # BLD AUTO: 0 10E3/UL
NRBC BLD AUTO-RTO: 0 /100
PLATELET # BLD AUTO: 291 10E3/UL (ref 150–450)
POTASSIUM BLD-SCNC: 3.4 MMOL/L (ref 3.4–5.3)
RBC # BLD AUTO: 4.74 10E6/UL (ref 4.4–5.9)
SODIUM SERPL-SCNC: 141 MMOL/L (ref 133–144)
WBC # BLD AUTO: 14.1 10E3/UL (ref 4–11)

## 2022-06-17 PROCEDURE — 99231 SBSQ HOSP IP/OBS SF/LOW 25: CPT | Performed by: INTERNAL MEDICINE

## 2022-06-17 PROCEDURE — 250N000011 HC RX IP 250 OP 636: Performed by: INTERNAL MEDICINE

## 2022-06-17 PROCEDURE — 92523 SPEECH SOUND LANG COMPREHEN: CPT | Mod: GN | Performed by: SPEECH-LANGUAGE PATHOLOGIST

## 2022-06-17 PROCEDURE — 99239 HOSP IP/OBS DSCHRG MGMT >30: CPT | Performed by: INTERNAL MEDICINE

## 2022-06-17 PROCEDURE — 36415 COLL VENOUS BLD VENIPUNCTURE: CPT | Performed by: INTERNAL MEDICINE

## 2022-06-17 PROCEDURE — 250N000013 HC RX MED GY IP 250 OP 250 PS 637: Performed by: STUDENT IN AN ORGANIZED HEALTH CARE EDUCATION/TRAINING PROGRAM

## 2022-06-17 PROCEDURE — 80048 BASIC METABOLIC PNL TOTAL CA: CPT | Performed by: INTERNAL MEDICINE

## 2022-06-17 PROCEDURE — 99233 SBSQ HOSP IP/OBS HIGH 50: CPT | Performed by: NURSE PRACTITIONER

## 2022-06-17 PROCEDURE — 250N000011 HC RX IP 250 OP 636: Performed by: STUDENT IN AN ORGANIZED HEALTH CARE EDUCATION/TRAINING PROGRAM

## 2022-06-17 PROCEDURE — 85025 COMPLETE CBC W/AUTO DIFF WBC: CPT | Performed by: INTERNAL MEDICINE

## 2022-06-17 RX ORDER — DEXAMETHASONE 4 MG/1
4 TABLET ORAL EVERY 8 HOURS SCHEDULED
Status: DISCONTINUED | OUTPATIENT
Start: 2022-06-17 | End: 2022-06-17 | Stop reason: HOSPADM

## 2022-06-17 RX ORDER — LEVETIRACETAM 1000 MG/1
1000 TABLET ORAL 2 TIMES DAILY
Qty: 60 TABLET | Refills: 1 | Status: ON HOLD | OUTPATIENT
Start: 2022-06-17 | End: 2022-07-19

## 2022-06-17 RX ORDER — DEXAMETHASONE 4 MG/1
4 TABLET ORAL EVERY 8 HOURS
Qty: 30 TABLET | Refills: 3 | Status: SHIPPED | OUTPATIENT
Start: 2022-06-17 | End: 2022-06-24

## 2022-06-17 RX ORDER — DEXAMETHASONE 2 MG/1
4 TABLET ORAL 3 TIMES DAILY
Qty: 21 TABLET | Refills: 3 | Status: SHIPPED | OUTPATIENT
Start: 2022-06-17 | End: 2022-06-24

## 2022-06-17 RX ORDER — LEVETIRACETAM 1000 MG/1
1000 TABLET ORAL 2 TIMES DAILY
Qty: 60 TABLET | Refills: 1 | Status: SHIPPED | OUTPATIENT
Start: 2022-06-17 | End: 2022-06-17

## 2022-06-17 RX ADMIN — DEXAMETHASONE 4 MG: 4 TABLET ORAL at 09:51

## 2022-06-17 RX ADMIN — DEXAMETHASONE 2 MG: 2 TABLET ORAL at 06:26

## 2022-06-17 RX ADMIN — DEXAMETHASONE 4 MG: 4 TABLET ORAL at 14:32

## 2022-06-17 RX ADMIN — HYDROCHLOROTHIAZIDE 25 MG: 25 TABLET ORAL at 08:14

## 2022-06-17 RX ADMIN — LEVETIRACETAM 1000 MG: 500 TABLET, FILM COATED ORAL at 08:14

## 2022-06-17 RX ADMIN — FAMOTIDINE 40 MG: 20 TABLET ORAL at 08:14

## 2022-06-17 ASSESSMENT — ACTIVITIES OF DAILY LIVING (ADL)
ADLS_ACUITY_SCORE: 22

## 2022-06-17 NOTE — CONSULTS
Department of Radiation Oncology                   [place] [address]  [address] [place]  [address]  Office:  [phone]  Fax:  [phone]   Radiation Oncology Clinic  [address] [place] [address]  Phone:  [phone]  Fax:  [phone]     RE: Connor Emerson : 1980   MRN: [id number] IWONA: Froylan 15, 2022       INPATIENT CONSULT NOTE       PROBLEM: productive aphasia in the setting of previously treated brain metastases    HISTORY OF PRESENT ILLNESS:   Mr. 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 He was previously treated by Dr. Arango.    Briefly, he presented in 2020 with shortness of breath and was found to have a large right-sided pleural effusion. Eventual work-up revealed a 8.2 x 9.6 cm right lung adenocarcinoma with ipsilateral hilar and bilateral mediastinal lymphadenopathy and contralateral pulmonary metastases. He also was found to have at least 13 lesions in his brain parenchyma. He underwent gamma knife stereotactic radiosurgery, single fraction frame based to all of his brain metastases (11 total targets). He had a Pleurx catheter placed on 2022 and was started on alectinib on 3/2/2022.    Surveillance brain MRI on 2022 showed resolution or decrease size of all of his treated lesions and no new lesions.    More recently, he presented to the [facility] emergency department on 6/10/2022 with complaints of approximately 1 week of word finding difficulties. MRI brain demonstrated interval enlargement of the left posterior frontal lobe lesion with increased surrounding edema and two mm rightward shift. He was started on dexamethasone 2 mg every 8 hours and declined transfer to [facility]. He  did return to the emergency department on 6/15/2022 at [facility] and was subsequently admitted for further management. Examination by neurosurgery showed expressive aphasia with mild word finding difficulty, mild right pronator drift, and no other significant changes.    Radiation oncology was consulted for input on diagnosis and management. On interview, he denies neurologic symptoms other than word finding difficulties, including specifically headaches, nausea, weakness, numbness, or other changes. He reports improvement with the steroids but is sometimes still frustrated by his productive aphasia.    PMH:   Past Medical History:   Diagnosis Date    Atypical chest pain 12/02/2013    Cancer (H)     Complication of anesthesia     Diabetes (H)     GERD (gastroesophageal reflux disease) 12/02/2013    HTN (hypertension) 05/14/2012    HTN, goal below 140/90 07/02/2013    Insomnia 02/21/2012    Mediastinal lymphadenopathy     Migraine headache 07/02/2013    Migraine with aura, without mention of intractable migraine without mention of status migrainosus     Pneumonia        PSH:  Past Surgical History:   Procedure Laterality Date    BRONCHOSCOPY RIGID OR FLEXIBLE W/TRANSENDOSCOPIC ENDOBRONCHIAL ULTRASOUND GUIDED Bilateral 1/26/2022    Procedure: Right BRONCHOSCOPY, FIBEROPTIC, endobronchial ultrasound, pleural biopsy;  Surgeon: Dallin Agrawal MD;  Location: [facility] OR    INJECT BLOCK MEDIAL BRANCH CERVICAL/THORACIC/LUMBAR      INSERT CHEST TUBE Right 2/16/2022    Procedure: INSERTION, CATHETER, INTERCOSTAL, FOR DRAINAGE;  Surgeon: Dallin Agrawal MD;  Location: [facility] GI    INSERT CHEST TUBE Right 3/9/2022    Procedure: INSERTION, CATHETER, INTERCOSTAL, FOR DRAINAGE;  Surgeon: Sushila Antonio MD;  Location: [facility] GI    IR CHEST TUBE REMOVAL TUNNELED RIGHT  4/2/2022    ORTHOPEDIC SURGERY      Ganesh. Rotator cuff repair.    PLEUROSCOPY N/A 1/26/2022    Procedure: Pleuroscopy with Pleural Biopsy;  Surgeon: Dallin Agrawal MD;  Location:  [facility] OR       MEDICATIONS:  No current outpatient medications on file.       ALLERGY:  Allergies   Allergen Reactions    Vicodin [Hydrocodone-Acetaminophen] Nausea and Vomiting and GI Disturbance       FAMILY HISTORY:  Family History   Problem Relation Age of Onset    Unknown/Adopted Mother     Uterine Cancer Mother     Unknown/Adopted Father     Unknown/Adopted Maternal Grandmother     Unknown/Adopted Maternal Grandfather     Stomach Cancer Maternal Grandfather     Unknown/Adopted Paternal Grandmother     Unknown/Adopted Paternal Grandfather     Melanoma Maternal Uncle        SOCIAL HISTORY  Social History     Tobacco Use    Smoking status: Never Smoker    Smokeless tobacco: Never Used   Substance Use Topics    Alcohol use: Yes     Alcohol/week: 0.0 standard drinks     Comment: social       ECOG PERFORMANCE STATUS: 3 (currently inpatient, previously 0)    HISTORY OF RADIATION:   Yes, pulmonary for stereotactic radiosurgery on 2/15/2022 as below:  Treatment Site Dose Isodose Modality Collimator (mm) Shots   LT Inf Cerebellar 20 GY 60% [place] 60 4mm 1   RT Lat Temporal 20 GY 70% [place] 60 4,8,16mm 5   LT Inf Frontal 22 Gy 80% [place] 60 4,8,16mm 3   RT Occipital 22 Gy 85% [place] 60 4mm 1   LT Ant Frontal 22 Gy 80% [place] 60 4,8,16mm 3   RT Parietal 22 Gy 85% [place] 60 8mm 1   RT Sup Parietal 20 GY 70% [place] 60 8,16mm 8   LT Sup Parietal 22 Gy 80% [place] 60 4,8mm 2   LT Post Cerebellar 18 Gy 55% [place] 60 4,8,16mm 15   RT Ant Frontal 18 Gy 50% [place]alt 60 4,16mm 8   LT Post Frontal 18 Gy 50% Cobalt 60 4,8,16mm 26                                                                                                      IMPLANTED CARDIAC DEVICE: None  PREGNANCY RISK: N/A    REVIEW OF SYMPTOMS: Pertinent negatives and positives as per HPI    PHYSICAL EXAMINATION:    Gen: Laying comfortably in hospital bed in no acute distress  Neurologic/MSK: Alert and oriented. Occasional mild expressive aphasia. Moves all extremities.  Psych:  Occasionally frustrated    IMAGING:  MRI Brain 6/10/2022  IMPRESSION:      1. Multiple intracranial metastases, with interval enlargement  of the  dominant lesion within the left frontal lobe and increased surrounding  vasogenic edema with 2 mm rightward shift of the septum pellucidum.    ASSESSMENT AND PLAN:   This is a 42-year-old man with ALK mutated stage IV metastatic non-small cell lung cancer (adenocarcinoma) status post gamma knife stereotactic radiosurgery to multiple intracranial metastasis, now presenting with expressive aphasia and crease in size of a previously treated lesion in the left frontal lobe with significant surrounding edema.    We discussed with the patient the differential diagnosis for this mass, namely progressive disease versus potentially radionecrosis following his previous radiosurgery treatment. In either case, steroids are recommended his initial care and he has had improvement with this. One option would be to continue on steroids and perform a short interval MRI with the goal to minimize invasive procedures. Alternatively, a craniotomy and resection would be both diagnostic and therapeutic, and is a reasonable option given the size and symptoms associated with the mass. We discussed these options with neurosurgery who is tentatively planning resection.    We deferred the decision on resection to the patient and the neurosurgery team. For now, we would recommend continuing steroids. If he does undergo surgery and pathology reveals recurrent tumor, reirradiation could be considered at that point, though it would be at high risk of future necrosis given prior treatment to this area.     We also discussed with medical oncology the possibility of radionecrosis, and that this may not represent alectinib failure.    Our team will continue to follow peripherally and his outpatient radiation oncologist, Dr. Arango, was alerted.    Thank you for allowing us to participate in this patient's  care.  Please feel free to call with any questions or concerns.    The patient was discussed with staff, Dr. Agrawal, who agrees with the above assessment and plan. He was discharged prior to being physically seen by Dr. Agrawal.      William Barton MD PGY-5  Radiation Oncology, [facility] [place]  [phone] clinic  Pager [phone]     I talked to the resident.  I agree with the resident's note and plan of care.      FABY Agrawal M.D.  Department of Radiation Oncology  [facility]

## 2022-06-17 NOTE — PROGRESS NOTES
"   06/17/22 0847   General Information   Onset of Illness/Injury or Date of Surgery 06/10/22   Referring Physician Cynthia Espinoza MD   Pertinent History of Current Problem Per H&P: \"Connor Emerson is a 42 year old male with a history of Stage IV NSCLC c/b mets to brain (s/p gamma knife in Feb 2022) on Alectinib, DM II (worsened by steroids), and anxiety who presented with aphasia and word finding difficulty over last few weeks.He presented to [facility] ED on 6/10 for evaluation of his symptoms. MRI brain showed multiple intracranial metastases, with interval enlargement of the dominant lesion within the left frontal lobe and increased surrounding vasogenic edema with 2 mm rightward shift of the septum pellucidum. Due to his worsening anxiety, he left AMA. His symptoms continued to progress to where he could not write at work so he decided to go to the ED for re-evaluation and treatment. \"   General Observations Patient was alert and upright in bed for today's evaluation   Type of Evaluation   Type of Evaluation Speech, Language, Cognition   Oral Motor   Oral Musculature generally intact   Dentition (Oral Motor)   Dentition (Oral Motor) adequate dentition   Facial Symmetry (Oral Motor)   Facial Symmetry (Oral Motor) WNL   Lip Function (Oral Motor)   Lip Range of Motion (Oral Motor) protrusion impairment;retraction impairment;other (see comments)  (Patieht with difficulty coordinating labial movements)   Lip Strength (Oral Motor) WNL   Protrusion, Lip Range of Motion bilateral;minimal impairment   Retraction, Lip Range of Motion bilateral;minimal impairment   Tongue Function (Oral Motor)   Tongue ROM (Oral Motor) WNL   Jaw Function (Oral Motor)   Jaw Function (Oral Motor) WNL   Cough/Swallow/Gag Reflex (Oral Motor)   Soft Palate/Velum (Oral Motor) WNL   Volitional Throat Clear/Cough (Oral Motor) WNL   Volitional Swallow (Oral Motor) WNL   Vocal Quality/Secretion Management (Oral Motor)   Vocal Quality (Oral Motor) WNL "   Speech   Vocal Loudness (Motor Speech) WNL   Speech Intelligibility (Motor Speech) WNL   Breath Support (Motor Speech) intact   Speech Fluency (Motor Speech) WNL   Rate/Prosody (Motor Speech) WNL   Articulation (Motor Speech) WNL   Phonation (motor speech) Adequate   Western Aphasia Battery- Revised Bedside Record From   Spontaneous Speech Content Score (out of 10) 9   Spontaneous Speech Fluency Score (out of 10) 9   Auditory Verbal Comprehension Score (out of 10) 9   Sequential Commands Score (out of 10) 10   Repetition Score (out of 10) 10   Object Naming Score (out of 10) 10   Bedside Aphasia Sum 57   WAB-R Bedside Aphasia Score 95   Reading Score (out of 10) 10   Writing Score (out of 10) 10   Bedside Language Sum 77   Bedside Language Score 96.25   Bedside Aphasia Classification Anomic Aphasia   Aphasia Severity Level Mild Aphasia   Auditory Comprehension   Follows Commands (Auditory Comprehension) WNL   Paragraph Comprehension (Auditory Comprehension) WNL   Object Identification (Auditory Comprehension) WNL   Yes/No Questions (Auditory Comprehension) WNL   Verbal Expression   Confrontational Naming (Verbal Expression) WNL   Conversational Speech (Verbal Expression) connected speech   Automatic Speech (Verbal Expression) WNL   Responsive Naming (Verbal Expression) WNL   Repetition Skills (Verbal Expression) WNL   Word Finding Skills (Verbal Expression) generative naming   Generative Naming, Word Finding (Verbal Expression) achieved with cues   Connected Speech, Conversational (Verbal Expression) minimal impairment;other (see comments)  (Mild anomia)   Reading Comprehension   Oral Reading Ability (Reading Comprehension) WNL   Comprehension Level (Reading Comprehension) WNL   Written Language   Written Expression (Written Language) sentence level   Sentence Level, Written Expression (Written Language) intact   Cognition   Cognitive Status Patient was alert and cooperative   Orientation Status (Cognition)  oriented x 4   Affect/Mental Status (Cognition) flat/blunted affect   Follows Commands (Cognition) WNL   General Therapy Interventions   Planned Therapy Interventions Language   Language Verbal expression;Written expression   Clinical Impression   Criteria for Skilled Therapeutic Interventions Met (SLP Eval) Yes, treatment indicated   SLP Diagnosis Mild Anomic Aphasia   Risks & Benefits of therapy have been explained evaluation/treatment results reviewed;care plan/treatment goals reviewed;participants voiced agreement with care plan;participants included;patient   Clinical Impression Comments Patient completed speech and language evaluation per provider orders. 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 Patient verbal expression demonstrates WFL automatic speech and confrontational naming, with rare (0-19%) anomic speech tendencies during generative naming tasks and in structured conversation. He can verbally express functional wants and needs independently. Noted increased processing time for conversational level tasks. Patient auditory comprehension is WFL at the unstructured conversational level. Reading comprehension appears WFL for reading paragraph level texts out loud and comprehension of paragraph level information. Written expression appears WFL, with patient demonstrating adequate written expression at the sentence leve, though does require increased processing time. Overall, patient reported that after evaluation, his language skills have significantly improved over the past few days, and he thought he performed better today than he initially anticipated. SLP will follow for expressive language treatment, targeting functional word finding strategies for conversational speech and in functional writing tasks.   SLP Discharge Planning   SLP Discharge Recommendation home with outpatient speech therapy   SLP Rationale for DC Rec Below baseline expressive  language skills   SLP Brief overview of current status  Overall patient demonstrates and self-reports below baseline expressive verbal and written language skills, but generally improving over hospital course. Continue to follow for use of word finding and speech strategies, with focus on functional word finding. May benefit from outpatient SLP services upon d/c if he feels language continues below baseline.    Total Evaluation Time   Total Evaluation Time (Minutes) 20

## 2022-06-17 NOTE — PROGRESS NOTES
Hematology / Oncology  Daily Progress Note   Date of Service: 06/17/2022  Patient: Connor Emerson  MRN: [id number]  Admission Date: 6/15/2022  Hospital Day # 2  Cancer Diagnosis: ALK mutated stage IV NSCLC-adenocarcinoma  Primary Outpatient Oncologist: Dr Bassam Persaud  Current Treatment Plan: Alectinib     Recommendations:   - Findings on MR Brain shows multiple intracranial metastases, with interval enlargement of the dominant lesion within the left frontal lobe and increased surrounding vasogenic edema with 2 mm rightward shift of the septum pellucidum. After discussion with [facility] this am, the changes on MRI may be due to radiation changes from previous Gamma Knife procedure. Please see their note for details. Given the positive response noted on CT CAP with no metastatic disease in abd or pelvis and decreased size of RLL and right pleural based masses, patient's presenting symptoms and imaging findings may not be caused by progression of disease.   - Resume Alectinib  - Recommend increasing dexamethasone to 4mg TID. Has been struggling with steroid induced hyperglycemia and will likely need further coverage to control blood glucose    Assessment & Plan:   Connor Emerson is a 42 year old male with a history of Stage IV NSCLC c/b mets to brain (s/p gamma knife in Feb 2022) on Alectinib, DM II (worsened by steroids), and anxiety who presented with aphasia and word finding difficulty over last few weeks.   He presented to [facility] ED on 6/10 for evaluation of his symptoms. MRI brain showed multiple intracranial metastases, with interval enlargement of the dominant lesion within the left frontal lobe and increased surrounding vasogenic edema with 2 mm rightward shift of the septum pellucidum. Due to his worsening anxiety, he left AMA. His symptoms continued to progress to where he could not write at work so he decided to go to the ED for re-evaluation and treatment.     #Stage IV NSCLC, mets to brain (s/p gamma  knife in Feb 2022)   # Aphasia  Follows with Dr Persaud  Initially diagnosed with Stage IV NSCLC with mets to the brain in 02/2022. Rt pleural mass biopsy pathology showed adenocarcinoma consistent with lung primary. PET/CT showed Right lower lobe central infiltrative FDG avid 8.2 x 9.6 cm mass representing a primary lung adenocarcinoma. Ipsilateral right perihilar, bilateral pretracheal, subcarinal and superior mediastinal michele metastases. Contralateral mildly FDG avid few lung nodules are suspicious for contralateral metastasis. Brain MRI showed at least 9 intracranial metastases.   He underwent gamma knife with radiation oncology to 12 lesions in the brain.  Started on Alectinib 3/2/2022 for positive EML4:ALK rearrangement. Of note, patient did show a overall positive response to treatment on 5/2/22 per CT CAP and MR Brain.  - MR Brain (6/16) shows multiple intracranial metastases, with interval enlargement  of the dominant lesion within the left frontal lobe and increased surrounding vasogenic edema with 2 mm rightward shift of the septum pellucidum.  - CT CAP (6/16) shows slightly decreased size of right lower lobe and right pleural-based masses. No new pulmonary nodules or lymphadenopathy; No evidence of metastatic disease in the abdomen or pelvis.   He now presents with enlargement of the frontal lobe lesion concerning for progression on Alectinib as evident on MRI brain on 6/10. Per Rad Onc, this could also be radionecrosis given previous radiation vs progressive disease. Given overall picture with improvement in Lung mass and no new metastatic disease, it would be unusual to have progression of disease in this setting, though not ruled out. Per RadOnc, could continue steroids and assess improvement with interval MR Brain imaging vs further NSGY intervention for both diagnostic and therapeutic benefit of this lesion.   - Resume Alectinib  - Recommend increasing dexamethasone to 4mg TID.    - Has been  "struggling with steroid induced hyperglycemia and will likely need further coverage to control blood glucose, per primary team  - Will ensure close follow up with outpatient oncology if patient were to discharge   - Requested JULIO visit within 1 week   - Will follow up with Dr Persaud on 7/6    Oncologic History:  See Consult note from 6/16    Patient was seen and plan of care was discussed with attending physician Dr. Guzmán.    Thank you for the opportunity to partake in this patients plan of care. Please do not hesitate to page with questions. We will continue to follow with you.     Lanie Holbrook (Nelson), PASHERRON   Hematology/Oncology   Pager: [phone]     I spent >35 minutes face-to-face and/or coordinating or discussing care plan. Over 50% of our time on the unit was spent counseling the patient and/or coordinating care    ___________________________________________________________________    Subjective & Interval History:    No acute events noted overnight.  Connor is overall feeling ok this morning. Improving with steroids. Discussed findings on MRI and our recommendations. Patient agreeable. All questions answered at bedside.    Physical Exam:    Blood pressure (!) 140/90, pulse (!) 48, temperature 98  F (36.7  C), temperature source Oral, resp. rate 16, height 1.753 m (5' 9\"), weight 92.4 kg (203 lb 11.2 oz), SpO2 96 %.    Constitutional: Pleasant male lying in bed. Awake and conversational. Non- toxic appearing. No acute distress.   HEENT: Normocephalic, atraumatic. Sclerae anicteric. PERRLA. EOM intact.   Respiratory: Breathing comfortable with no increased work on room air. No signs of respiratory distress or accessory muscle use.   GI: Abdomen is soft, non-distended, protuberant.  Skin: Skin is clean, dry, intact. No jaundice appreciated. Multiple tattoos present.  Neurologic: Alert and oriented. Speech grossly normal. Grossly nonfocal. Memory and thought process preserved.    Neuropsychiatric: Calm, affect " congruent to situation. Appropriate mood and affect. Good judgment and insight. No visual/auditory hallucinations.     Labs & Studies: I personally reviewed the following studies:  ROUTINE LABS (Last four results):  CMP  Recent Labs   Lab 06/17/22  0848 06/16/22  2211 06/16/22  1950 06/16/22  1738 06/16/22  0757 06/16/22 0424 06/15/22  1902 06/15/22  1533 06/14/22  1321     --   --   --   --  139  --  139 139   POTASSIUM 3.4  --   --   --   --  3.0*  --  3.2* 3.3*   CHLORIDE 105  --   --   --   --  106  --  103 106   CO2 29  --   --   --   --  26  --  29 25   ANIONGAP 7  --   --   --   --  7  --  7 8   * 247* 299* 399*   < > 274*   < > 394* 471*   BUN 20  --   --   --   --  24  --  22 22   CR 0.56*  --   --   --   --  0.56*  --  0.59* 0.64*   GFRESTIMATED >90  --   --   --   --  >90  --  >90 >90   TORY 8.9  --   --   --   --  8.5  --  9.4 9.0   PROTTOTAL  --   --   --   --   --   --   --   --  6.5*   ALBUMIN  --   --   --   --   --   --   --   --  3.9   BILITOTAL  --   --   --   --   --   --   --   --  0.8   ALKPHOS  --   --   --   --   --   --   --   --  134   AST  --   --   --   --   --   --   --   --  13   ALT  --   --   --   --   --   --   --   --  20    < > = values in this interval not displayed.     CBC  Recent Labs   Lab 06/17/22 0848 06/16/22  0424 06/15/22  1533 06/14/22  1321   WBC 14.1* 11.1* 13.4* 12.0*   RBC 4.74 4.41 4.63 4.34*   HGB 14.9 13.5 14.4 13.6   HCT 43.0 40.0 41.7 41.0   MCV 91 91 90 95   MCH 31.4 30.6 31.1 31.3   MCHC 34.7 33.8 34.5 33.2   RDW 13.2 13.0 13.0 12.9    267 290 270     INRNo lab results found in last 7 days.    Medications list for reference:  Current Facility-Administered Medications   Medication     [Held by provider] alectinib (ALECENSA) capsule 450 mg     dexamethasone (DECADRON) tablet 4 mg     glucose gel 15-30 g    Or     dextrose 50 % injection 25-50 mL    Or     glucagon injection 1 mg     famotidine (PEPCID) tablet 40 mg     hydrochlorothiazide  (HYDRODIURIL) tablet 25 mg     insulin aspart (NovoLOG) injection (RAPID ACTING)     insulin aspart (NovoLOG) injection (RAPID ACTING)     insulin aspart (NovoLOG) injection (RAPID ACTING)     insulin aspart (NovoLOG) injection (RAPID ACTING)     insulin aspart (NovoLOG) injection (RAPID ACTING)     insulin glargine (LANTUS PEN) injection 25 Units     levETIRAcetam (KEPPRA) tablet 1,000 mg     lidocaine (LMX4) cream     lidocaine 1 % 0.1-1 mL     melatonin tablet 1 mg     metoclopramide (REGLAN) tablet 5 mg     naloxone (NARCAN) injection 0.2 mg    Or     naloxone (NARCAN) injection 0.4 mg    Or     naloxone (NARCAN) injection 0.2 mg    Or     naloxone (NARCAN) injection 0.4 mg     oxyCODONE (ROXICODONE) tablet 5 mg     prochlorperazine (COMPAZINE) tablet 10 mg     sodium chloride (PF) 0.9% PF flush 3 mL     sodium chloride (PF) 0.9% PF flush 3 mL

## 2022-06-17 NOTE — PROGRESS NOTES
Pt discharged to: Home  Via: personal vehicle  Accompanied by: father  Belongings: sent with patient  Teaching: reviewed discharge medications/follow up appointments  Clinic appointment: needs to make follow up with Dr. Moran neurosurgeon ASAP pt verbalized understanding. Picking up 2 medications (decadron, Keppra) at [facility] in [place].

## 2022-06-17 NOTE — DISCHARGE SUMMARY
[facility]  Hospitalist Discharge Summary      Date of Admission:  6/15/2022  Date of Discharge:  6/17/2022  4:00 PM  Discharging Provider: Joss Rouse MD  Discharge Service: Hospitalist Service, GOLD TEAM 7    Discharge Diagnoses   Lung cancer with brain mets - presenting as aphasia  Type 2 diabetes, uncontrolled secondary to steroids    Follow-ups Needed After Discharge   I advised to the patient and relayed through his nurse that follow-up needed to be early next week - Monday preferred.  Via text communication - medical oncology aware and would be following up with the patient early next week to confirm he's doing ok.  I was going into the patient's room when neurosurgery MD - Dr. Banks was there as well (in hallway) - I had received information from XRT service that their plan recommendations were to increase the Decadron and that the abnormality seen on imaging was likely related to radiation inflammatory reaction.  Dr. Banks was made aware of that conclusion and she left down the hallway with neurosurg PA.  Dr. Agrawal from radiation oncology was ok with my discharging the patient with whom I talked about this patient's case.  I briefly updated Dr. Reynoso(?sp) from neurosurg as well.    Discharge Disposition   Discharged to home  Condition at discharge: Stable    Hospital Course   The patient notes some improvement in his difficulty with word finding and still notes some slowness of speech.  He denies any headache.  Denies any difficulty walking or using upper extremities.  Patient wanting to discharge and get back to working - I advised that he needs to see his PMD and neurosurgeon prior to return to work.  Once radiation oncology had given impression of wanting to increase Decadron and to monitor / follow his symptoms, patient had no further indication for staying in the hospital.  I discharged the patient on increased Decadron dosing and the Keppra.      Consultations This Hospital Stay   ONCOLOGY ADULT IP CONSULT  OCCUPATIONAL THERAPY ADULT IP CONSULT  SPEECH LANGUAGE PATH ADULT IP CONSULT  PALLIATIVE CARE ADULT IP CONSULT  RADIATION ONCOLOGY IP CONSULT    Code Status   Full Code    Time Spent on this Encounter   I, Joss Rouse MD, personally saw the patient today and spent greater than 30 minutes discharging this patient.       Joss Rouse MD  [facility] [address] [facility]  [address] [place] [id number]  Phone: [phone]  Fax: [phone]  ______________________________________________________________________    Physical Exam   Vital Signs: Temp: 98.4  F (36.9  C) Temp src: Oral BP: (!) 135/97 Pulse: (!) 49   Resp: 16 SpO2: 97 % O2 Device: None (Room air)    Weight: 203 lbs 11.2 oz  General Appearance: Up walking around his hospital room without difficulty.  Minimal slowing of speech and rare minor word finding difficulty  Respiratory: lungs clear  Cardiovascular: slow HR, no murmur  GI: soft, nontender  Skin: unremarkable  Neurologic exam:  Speech fluent with occasional minimal slowness  Strength and coordination of extremities are normal        Primary Care Physician   Radha Shetty    Discharge Orders      Reason for your hospital stay    Difficulty speaking.     Activity    Your activity upon discharge: no lifting, driving, or strenuous exercise for - to be determined.  Primary MD to assess and address in the future.     Discharge Instructions    Call your neurosurgeon's office ASAP for appointment next Monday - to follow-up on your difficulty speaking.     [facility] Follow-up and recommended labs and tests    Follow up with Dr. Moran , at (location with clinic name or [facility]) [place], within 3 days  to evaluate medication change, to evaluate treatment change, for hospital follow- up, and regarding new diagnosis. The following labs/tests are recommended: MRI.    Appointments on [place] and/or  [facility] (with [facility] or [facility] provider or service). Call [phone] if you haven't heard regarding these appointments within 7 days of discharge.     Full Code    As per prior documentation     Diet    Follow this diet upon discharge: Moderate Consistent Carb (60 g CHO per Meal) Diet     Check Out Appointment Request    Please schedule Connor with an JULIO early next week  with labs. Please keep his appointment with Dr Persaud on 4/6 as well.     Significant Results and Procedures   Results for orders placed or performed during the hospital encounter of 06/15/22   CT Chest/Abdomen/Pelvis w Contrast    Narrative    EXAMINATION: CT CHEST/ABDOMEN/PELVIS W CONTRAST, 6/16/2022 5:34 PM    TECHNIQUE:  Helical CT images from the thoracic inlet through the  symphysis pubis were obtained  with contrast. Contrast dose: Isovue  370 110ml    COMPARISON: 5/2/2022    HISTORY: New brain metastases. Evaluate progression of primary cancer.    FINDINGS:    Chest:   Thyroid is unremarkable. Heart is not enlarged. No pericardial  effusion. Normal caliber thoracic aorta and main pulmonary artery.  Conventional great arch anatomy. No new or worsened hilar or  mediastinal adenopathy.    Central tracheobronchial tree is patent. Decreased size of right  posterior pleural-based lesion measuring 1.1 x 2.2 cm (series 2, image  29), previously about 1.6 x 2.2 cm. Slight decreased size of right  lower lobe paravertebral lesion measuring 1.4 x 1.7 cm (series 2,  image 29), previously 1.6 x 2.1 cm, with stable mild surrounding  atelectasis. No new or enlarging pulmonary nodules. No new focal  consolidation. Trace right effusion similar to prior. No pneumothorax.    Abdomen and pelvis:   No focal liver lesion. Decompressed gallbladder which appears normal.  Spleen, adrenals, and pancreas are normal. Symmetric enhancement of  the kidneys without evidence of hydronephrosis or nephrolithiasis.  Stable fluid attenuating simple cysts bilaterally.  Coarse prostatic  calcifications. Normal caliber small and large bowel without evidence  of obstruction. Appendix is normal. No free intraperitoneal fluid or  air. Stable small fat-containing umbilical hernia.    Abdominal aorta is patent and without focal aneurysm. The major  branches of the abdominal aorta are grossly patent. The portal vein is  patent. No pathologically enlarged lymph nodes in the abdomen or  pelvis.    Bones and soft tissues: No acute or suspicious osseous lesion. Right  humeral head suture anchors.      Impression    IMPRESSION:   1. Slightly decreased size of right lower lobe and right pleural-based  masses. No new pulmonary nodules or lymphadenopathy.  2. No evidence of metastatic disease in the abdomen or pelvis.     I have personally reviewed the examination and initial interpretation  and I agree with the findings.    GABRIELLA GARCIA MD         SYSTEM ID:  [id number]     Discharge Medications   Current Discharge Medication List      START taking these medications    Details   levETIRAcetam (KEPPRA) 1000 MG tablet Take 1 tablet (1,000 mg) by mouth 2 times daily  Qty: 60 tablet, Refills: 1    Associated Diagnoses: Brain metastasis (H); Malignant neoplasm of lower lobe of right lung (H)         CONTINUE these medications which have CHANGED    Details   !! dexamethasone (DECADRON) 2 MG tablet Take 2 tablets (4 mg) by mouth 3 times daily  Qty: 21 tablet, Refills: 3    Associated Diagnoses: Brain metastasis (H); Malignant neoplasm of lower lobe of right lung (H)      !! dexamethasone (DECADRON) 4 MG tablet Take 1 tablet (4 mg) by mouth every 8 hours  Qty: 30 tablet, Refills: 3    Associated Diagnoses: Brain metastasis (H)       !! - Potential duplicate medications found. Please discuss with provider.      CONTINUE these medications which have NOT CHANGED    Details   alcohol swab prep pads Use to swab area of injection/jabier as directed.  Qty: 100 each, Refills: 3    Associated Diagnoses: Type  2 diabetes mellitus with hyperglycemia, with long-term current use of insulin (H)      alectinib (ALECENSA) 150 MG CAPS Take 3 capsules (450 mg) by mouth 2 times daily (with meals)  Qty: 180 capsule, Refills: 11    Comments: Dose change  Associated Diagnoses: Malignant neoplasm of lower lobe of right lung (H)      blood glucose (NO BRAND SPECIFIED) lancets standard Use to test blood sugar 1 times daily or as directed.  Qty: 100 each, Refills: 0    Associated Diagnoses: Type 2 diabetes mellitus with hyperglycemia, without long-term current use of insulin (H)      !! blood glucose (NO BRAND SPECIFIED) test strip Use to test blood sugar 2 times daily or as directed. To accompany: Blood Glucose Monitor Brands: per insurance.  Qty: 100 strip, Refills: 6    Associated Diagnoses: Type 2 diabetes mellitus with hyperglycemia, with long-term current use of insulin (H)      !! blood glucose (NO BRAND SPECIFIED) test strip Use to test blood sugar 1 times daily or as directed.  Qty: 1 Box, Refills: 0    Associated Diagnoses: Type 2 diabetes mellitus with hyperglycemia, without long-term current use of insulin (H)      blood glucose calibration (NO BRAND SPECIFIED) solution To accompany: Blood Glucose Monitor Brands: per insurance.  Qty: 1 each, Refills: 0    Associated Diagnoses: Type 2 diabetes mellitus with hyperglycemia, with long-term current use of insulin (H)      !! blood glucose monitoring (NO BRAND SPECIFIED) meter device kit Use to test blood sugar 2 times daily or as directed. Preferred blood glucose meter OR supplies to accompany: Blood Glucose Monitor Brands: per insurance.  Qty: 1 kit, Refills: 0    Associated Diagnoses: Type 2 diabetes mellitus with hyperglycemia, with long-term current use of insulin (H)      !! blood glucose monitoring (NO BRAND SPECIFIED) meter device kit Use to test blood sugar 1 times daily or as directed.  Qty: 1 kit, Refills: 0    Associated Diagnoses: Type 2 diabetes mellitus with  hyperglycemia, without long-term current use of insulin (H)      famotidine (PEPCID) 40 MG tablet Take 40 mg by mouth daily      hydrochlorothiazide (HYDRODIURIL) 25 MG tablet Take 1 tablet (25 mg) by mouth in the morning.  Qty: 30 tablet, Refills: 3    Associated Diagnoses: Primary hypertension      insulin glargine (LANTUS PEN) 100 UNIT/ML pen Inject 20 Units Subcutaneous At Bedtime  Qty: 15 mL, Refills: 0    Comments: If Lantus is not covered by insurance, may substitute Basaglar or Semglee or other insulin glargine product per insurance preference at same dose and frequency.    Associated Diagnoses: Type 2 diabetes mellitus with hyperglycemia, with long-term current use of insulin (H)      insulin pen needle (31G X 8 MM) 31G X 8 MM miscellaneous Use one pen needles daily or as directed.  Qty: 100 each, Refills: 0    Associated Diagnoses: Type 2 diabetes mellitus without complication (H)      metoclopramide (REGLAN) 5 MG tablet Take 1 tablet (5 mg) by mouth 3 times daily as needed (hiccups)  Qty: 30 tablet, Refills: 1    Associated Diagnoses: Hiccups      nystatin (MYCOSTATIN) 348006 UNIT/ML suspension Take 2 mLs (200,000 Units) by mouth 4 times daily  Qty: 60 mL, Refills: 1    Associated Diagnoses: Thrush      oxyCODONE (ROXICODONE) 5 MG tablet Take 1 tablet (5 mg) by mouth every 6 hours as needed for pain  Qty: 30 tablet, Refills: 0    Associated Diagnoses: Cancer associated pain      prochlorperazine (COMPAZINE) 10 MG tablet Take 1 tablet (10 mg) by mouth every 6 hours as needed (Nausea/Vomiting)  Qty: 30 tablet, Refills: 2    Associated Diagnoses: Malignant neoplasm of lower lobe of right lung (H)      thin (NO BRAND SPECIFIED) lancets Use with lanceting device. To accompany: Blood Glucose Monitor Brands: per insurance.  Qty: 100 each, Refills: 6    Associated Diagnoses: Type 2 diabetes mellitus with hyperglycemia, with long-term current use of insulin (H)       !! - Potential duplicate medications found.  Please discuss with provider.        Allergies   Allergies   Allergen Reactions     Vicodin [Hydrocodone-Acetaminophen] Nausea and Vomiting and GI Disturbance

## 2022-06-17 NOTE — PROGRESS NOTES
"[facility], [place]   Neurosurgery Progress Note:    Date of service: 6/17/2022    Assessment: Connor Emerson is a right-handed 42 year old male with PMHx of Diabetes mellitus type 2, HTN, and recently diagnosed Stage IV NSCLC with brain metastasis (s/p gamma knife radiation- 2/2022, followed by Dr. Moran) who presented to ED at recommendation of Neurosurgery for evaluation and possible intervention.     He reports that approximately 1 week ago he experienced new onset of delayed speech (word finding difficulty and difficulty getting his words out) and difficulty writing. Recent MRI Brain demonstrated interval enlargement of left frontal lobe mass with significant vasogenic edema and mass effect. Upon admission he continues to endorse word-finding and writing difficulty, but denies any new symptoms including headache, confusion, dizziness, vision changes, nausea, weakness, numbness, sensory changes, or tingling.     Clinically Significant Risk Factors Present on Admission              # Obesity: Estimated body mass index is 30.08 kg/m  as calculated from the following:    Height as of this encounter: 1.753 m (5' 9\").    Weight as of this encounter: 92.4 kg (203 lb 11.2 oz).   # Compression of brain  # HTN  # Stage IV NSCLC with brain metastasis      Objective:   Temp:  [97.5  F (36.4  C)-97.9  F (36.6  C)] 97.9  F (36.6  C)  Pulse:  [54-66] 66  Resp:  [16-18] 18  BP: (124-157)/() 143/91  SpO2:  [94 %-100 %] 94 %  No intake/output data recorded.    Gen: Appears comfortable, NAD  Neurologic:  - Alert & Oriented to person, place, time, and situation  - Follows commands briskly  - Speech fluent, spontaneous.  Expressive aphasia with mild word finding difficulty -improving  - No gaze preference. No apparent hemineglect.  - PERRL, EOMI  - Strong eye closure, jaw clench, and cheek puff  - Face symmetric with sensation intact to light touch  - Palate elevates symmetrically, uvula midline, " tongue protrudes midline  - Trapezii and sternocleidomastoid muscles 5/5 bilaterally  - No pronator drift noted today     Del Tr Bi WE WF Gr   R 5 5 5 5 5 5   L 5 5 5 5 5 5    HF KE KF DF PF EHL   R 5 5 5 5 5 5   L 5 5 5 5 5 5     Interval History:  He denies headache or vision changes. Expressive aphasia improving. He has not attempted to write while in the hospital. CT Chest/abd/pelvis revealed no new evidence of metastasis.  Will obtain stealth CT prior to any surgical intervention. Tentative surgical intervention on Sunday with Dr. Banks.     Plan:  - Serial neuro checks  - SBP <160  - Appreciate Oncology Consult  - Keppra 1g BID  - Decadron 2mg TID  - Stealth head CT with fiducials prior to surgical intervention, likely Saturday evening. (Neurosurgery will order).   - Tentative neurosurgical intervention on Sunday    JERRY Bernal, CNP  Neurosurgery  Pager [phone]      I have spent a total of 25 minutes total time counseling, coordination, and chart review, over 50% of the time was spent in direct patient care.     LABS  Recent Labs   Lab Test 06/17/22  0848 06/16/22  0424 06/15/22  1533   WBC 14.1* 11.1* 13.4*   HGB 14.9 13.5 14.4   MCV 91 91 90    267 290       Recent Labs   Lab Test 06/16/22  2211 06/16/22  1950 06/16/22  1738 06/16/22  0757 06/16/22  0424 06/15/22  1902 06/15/22  1533 06/14/22  1321   NA  --   --   --   --  139  --  139 139   POTASSIUM  --   --   --   --  3.0*  --  3.2* 3.3*   CHLORIDE  --   --   --   --  106  --  103 106   CO2  --   --   --   --  26  --  29 25   BUN  --   --   --   --  24  --  22 22   CR  --   --   --   --  0.56*  --  0.59* 0.64*   ANIONGAP  --   --   --   --  7  --  7 8   TORY  --   --   --   --  8.5  --  9.4 9.0   * 299* 399*   < > 274*   < > 394* 471*    < > = values in this interval not displayed.       IMAGING    No results found for this or any previous visit (from the past 24 hour(s)).

## 2022-06-17 NOTE — PROGRESS NOTES
[facility]  Palliative Care Daily Progress Note       Recommendations & Counseling       Patient planned for discharge today,he tells me he is open to establishing care with Palliative Care clinic. Referral sent and patient should be called to schedule an appointment to continue helping assess for symptoms, provide support to patient and family and complete advanced care planning.      Total time spent was 15 minutes,  >50% of time was spent counseling and/or coordination of care regarding support with coping, assistance with follow up.    [facility] -[phone]   Team Consult Pager [phone] (answered 8am-430pm M-F) - ok to text page via [employer] / After-Hours Answering Service [phone] / Palliative Clinic in the [facility] at the [facility] - [phone] (scheduling); [phone] (triage).      Assessments          Connor Emerson is a 42-year-old man with PMH that includes recently diagnosed stage IV non-small cell lung cancer with brain metastases, status post gamma knife radiation February 2022, on alectinib (dose reduced due to bothersome side effects).  Initial presenting symptom was shortness of breath related to large right pleural effusion status post thoracenteses and Pleurx catheter (now removed).  Pt admitted 6/15/22 admitted with word finding difficulty and difficulty writing for approximately the last week, brain MRI demonstrates interval enlargement of left frontal lobe mass with significant vasogenic edema and mass-effect.  Neurosurgery and oncology are consulted.  Palliative care consulted to assist with symptom management and establish care.    Today, the patient was seen for:  --Metastatic NSCLC with brain mets, new enlargement of brain mass with new neuro deficits  --support with coping    Prognosis, Goals, or Advance Care Planning was addressed today with: Yes.  Mood, coping, and/or meaning in the context of serious illness were  addressed today: Yes.  Summary/Comments:            Interval History:     Chart review/discussion with unit or clinical team members:   No acute events overnight    Per patient or family/caregivers today:  Pt does not endorse pain, dyspnea, nausea today. Happy to be going home. Family bedside and supportive.             Review of Systems:     Besides above, an additional 1 system ROS was reviewed and is unremarkable          Medications:     I have reviewed this patient's medication profile and medications during this hospitalization.           Physical Exam:   Vitals were reviewed  Temp: 98.4  F (36.9  C) Temp src: Oral BP: (!) 135/97 Pulse: (!) 49   Resp: 16 SpO2: 97 % O2 Device: None (Room air)    Gen: NAD, sitting up comfortably, pleasant and cooperative with interview and exam  HEENT: normocephalic, no perioral lesions  Pulm: no tachypnea, no increased work of breathing  LE: no edema bilaterally on gross visualization  Skin: no rash or jaundice on limited exam  Neuro: AOx3  Psych: mood-affect congruent               Data Reviewed:     Reviewed recent pertinent imaging.    Reviewed recent labs.

## 2022-06-17 NOTE — PROGRESS NOTES
"   06/17/22 0847   General Information   Pertinent History of Current Problem Per H&P: \"   General Observations Patient was alert and upright in bed for today's evaluation   Type of Evaluation   Type of Evaluation Speech, Language, Cognition   Oral Motor   Oral Musculature generally intact   Dentition (Oral Motor)   Dentition (Oral Motor) adequate dentition   Facial Symmetry (Oral Motor)   Facial Symmetry (Oral Motor) WNL   Lip Function (Oral Motor)   Lip Range of Motion (Oral Motor) protrusion impairment;retraction impairment;other (see comments)  (Patieht with difficulty coordinating labial movements)   Lip Strength (Oral Motor) WNL   Protrusion, Lip Range of Motion bilateral;minimal impairment   Retraction, Lip Range of Motion bilateral;minimal impairment   Tongue Function (Oral Motor)   Tongue ROM (Oral Motor) WNL   Jaw Function (Oral Motor)   Jaw Function (Oral Motor) WNL   Cough/Swallow/Gag Reflex (Oral Motor)   Soft Palate/Velum (Oral Motor) WNL   Volitional Throat Clear/Cough (Oral Motor) WNL   Volitional Swallow (Oral Motor) WNL   Vocal Quality/Secretion Management (Oral Motor)   Vocal Quality (Oral Motor) WNL   Speech   Vocal Loudness (Motor Speech) WNL   Speech Intelligibility (Motor Speech) WNL   Breath Support (Motor Speech) intact   Speech Fluency (Motor Speech) WNL   Rate/Prosody (Motor Speech) WNL   Articulation (Motor Speech) WNL   Phonation (motor speech) Adequate   Western Aphasia Battery- Revised Bedside Record From   Spontaneous Speech Content Score (out of 10) 9   Spontaneous Speech Fluency Score (out of 10) 9   Auditory Verbal Comprehension Score (out of 10) 9   Sequential Commands Score (out of 10) 10   Repetition Score (out of 10) 10   Object Naming Score (out of 10) 10   Bedside Aphasia Sum 57   WAB-R Bedside Aphasia Score 95   Reading Score (out of 10) 10   Writing Score (out of 10) 10   Bedside Language Sum 77   Bedside Language Score 96.25   Bedside Aphasia Classification Anomic Aphasia "   Aphasia Severity Level Mild Aphasia   Auditory Comprehension   Follows Commands (Auditory Comprehension) WNL   Paragraph Comprehension (Auditory Comprehension) WNL   Object Identification (Auditory Comprehension) WNL   Yes/No Questions (Auditory Comprehension) WNL   Verbal Expression   Confrontational Naming (Verbal Expression) WNL   Conversational Speech (Verbal Expression) connected speech   Automatic Speech (Verbal Expression) WNL   Responsive Naming (Verbal Expression) WNL   Repetition Skills (Verbal Expression) WNL   Word Finding Skills (Verbal Expression) generative naming   Generative Naming, Word Finding (Verbal Expression) achieved with cues   Connected Speech, Conversational (Verbal Expression) minimal impairment;other (see comments)  (Mild anomia)   Reading Comprehension   Oral Reading Ability (Reading Comprehension) WNL   Comprehension Level (Reading Comprehension) WNL   Written Language   Written Expression (Written Language) sentence level   Sentence Level, Written Expression (Written Language) intact   Cognition   Cognitive Status Patient was alert and cooperative   Orientation Status (Cognition) oriented x 4   Affect/Mental Status (Cognition) flat/blunted affect   Follows Commands (Cognition) WNL   General Therapy Interventions   Planned Therapy Interventions Language   Language Verbal expression;Written expression   Clinical Impression   Criteria for Skilled Therapeutic Interventions Met (SLP Eval) Yes, treatment indicated   SLP Diagnosis Mild Anomic Aphasia   Risks & Benefits of therapy have been explained evaluation/treatment results reviewed;care plan/treatment goals reviewed;participants voiced agreement with care plan;participants included;patient   Clinical Impression Comments Patient completed speech and language evaluation per provider orders. 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 Patient verbal expression demonstrates WFL automatic  speech and confrontational naming, with rare (0-19%) anomic speech tendencies during generative naming tasks and in structured conversation. He can verbally express functional wants and needs independently. Noted increased processing time for conversational level tasks. Patient auditory comprehension is WFL at the unstructured conversational level. Reading comprehension appears WFL for reading paragraph level texts out loud and comprehension of paragraph level information. Written expression appears WFL, with patient demonstrating adequate written expression at the sentence leve, though does require increased processing time. Overall, patient reported that after evaluation, his language skills have significantly improved over the past few days, and he thought he performed better today than he initially anticipated. SLP will follow for expressive language treatment, targeting functional word finding strategies for conversational speech and in functional writing tasks.   SLP Discharge Planning   SLP Discharge Recommendation home with outpatient speech therapy   SLP Rationale for DC Rec Below baseline expressive language skills   SLP Brief overview of current status  Overall patient demonstrates and self-reports below baseline expressive verbal and written language skills, but generally improving over hospital course. Continue to follow for use of word finding and speech strategies, with focus on functional word finding. May benefit from outpatient SLP services upon d/c if he feels language continues below baseline.    Total Evaluation Time   Total Evaluation Time (Minutes) 20

## 2022-06-17 NOTE — PLAN OF CARE
"Goal Outcome Evaluation:  Afebrile. OVSS on RA. Up independent. Voiding well per pt report. No BM overnight; waiting for stool sample for r/o C Diff. Pt denies pain, nausea, and SOB at this time. Neuro checks completed q 4 hours; expressive mild aphasia; word-finding difficulty. Glucose checks ACHS; mid to upper 200's; corrected per sliding scale insulin. Pt currently resting in bed. Plan for neurosurgery on Saturday or Sunday.    Problem: Plan of Care - These are the overarching goals to be used throughout the patient stay.    Goal: Plan of Care Review/Shift Note  Description: The Plan of Care Review/Shift note should be completed every shift.  The Outcome Evaluation is a brief statement about your assessment that the patient is improving, declining, or no change.  This information will be displayed automatically on your shift note.  Outcome: Ongoing, Progressing  Goal: Patient-Specific Goal (Individualized)  Description: You can add care plan individualizations to a care plan. Examples of Individualization might be:  \"Parent requests to be called daily at 9am for status\", \"I have a hard time hearing out of my right ear\", or \"Do not touch me to wake me up as it startles me\".  Outcome: Ongoing, Progressing  Goal: Absence of Hospital-Acquired Illness or Injury  Outcome: Ongoing, Progressing  Intervention: Identify and Manage Fall Risk  Recent Flowsheet Documentation  Taken 6/16/2022 2000 by Alecia Arias, RN  Safety Promotion/Fall Prevention:    assistive device/personal items within reach    clutter free environment maintained    fall prevention program maintained    nonskid shoes/slippers when out of bed    patient and family education  Intervention: Prevent Skin Injury  Recent Flowsheet Documentation  Taken 6/16/2022 2000 by Alecia Arias, RN  Body Position: position changed independently  Intervention: Prevent and Manage VTE (Venous Thromboembolism) Risk  Recent Flowsheet Documentation  Taken 6/16/2022 " 2000 by Alecia Arias, RN  VTE Prevention/Management: SCDs (sequential compression devices) off  Activity Management: up ad avni  Goal: Optimal Comfort and Wellbeing  Outcome: Ongoing, Progressing  Goal: Readiness for Transition of Care  Outcome: Ongoing, Progressing     Problem: Hyperosmolar Hyperglycemic State  Goal: Serum Glucose and Electrolytes Within Desired Range  Outcome: Ongoing, Progressing     Problem: Pain Acute  Goal: Acceptable Pain Control and Functional Ability  Outcome: Ongoing, Progressing  Intervention: Prevent or Manage Pain  Recent Flowsheet Documentation  Taken 6/16/2022 2000 by Alecia Arias RN  Medication Review/Management: medications reviewed     Problem: Hyperglycemia  Goal: Blood Glucose Level Within Targeted Range  Outcome: Ongoing, Progressing     Problem: Anemia (Chemotherapy Effects)  Goal: Anemia Symptom Improvement  Outcome: Ongoing, Progressing  Intervention: Monitor and Manage Anemia  Recent Flowsheet Documentation  Taken 6/16/2022 2000 by Alecia Arias RN  Safety Promotion/Fall Prevention:    assistive device/personal items within reach    clutter free environment maintained    fall prevention program maintained    nonskid shoes/slippers when out of bed    patient and family education     Problem: Urinary Bleeding Risk or Actual (Chemotherapy Effects)  Goal: Absence of Hematuria  Outcome: Ongoing, Progressing     Problem: Nausea and Vomiting (Chemotherapy Effects)  Goal: Fluid and Electrolyte Balance  Outcome: Ongoing, Progressing  Intervention: Prevent and Manage Nausea and Vomiting  Recent Flowsheet Documentation  Taken 6/16/2022 2000 by Alecia Arias, RN  Oral Care:    oral rinse provided    teeth brushed     Problem: Neurotoxicity (Chemotherapy Effects)  Goal: Neurotoxicity Symptom Control  Outcome: Ongoing, Progressing     Problem: Neutropenia (Chemotherapy Effects)  Goal: Absence of Infection  Outcome: Ongoing, Progressing  Intervention: Prevent  Infection and Maximize Resistance  Recent Flowsheet Documentation  Taken 6/16/2022 2000 by Alecia Arias, RN  Oral Care:    oral rinse provided    teeth brushed     Problem: Oral Mucositis (Chemotherapy Effects)  Goal: Improved Oral Mucous Membrane Integrity  Outcome: Ongoing, Progressing  Intervention: Promote Oral Comfort and Health  Recent Flowsheet Documentation  Taken 6/16/2022 2000 by Alecia Arias, RN  Oral Care:    oral rinse provided    teeth brushed     Problem: Thrombocytopenia Bleeding Risk (Chemotherapy Effects)  Goal: Absence of Bleeding  Outcome: Ongoing, Progressing     Problem: Fatigue  Goal: Improved Activity Tolerance  Outcome: Ongoing, Progressing  Intervention: Promote Improved Energy  Recent Flowsheet Documentation  Taken 6/16/2022 2000 by Alecia Arias, RN  Activity Management: up ad avni

## 2022-06-17 NOTE — DISCHARGE INSTRUCTIONS
Follow-up patient's neurosurgeon Dr. Moran -- patient to call for appointment ASAP - for Monday, this coming week.  AYLIN Rouse MD

## 2022-06-18 DIAGNOSIS — Z71.89 OTHER SPECIFIED COUNSELING: ICD-10-CM

## 2022-06-19 ENCOUNTER — PATIENT OUTREACH (OUTPATIENT)
Dept: CARE COORDINATION | Facility: CLINIC | Age: 42
End: 2022-06-19
Payer: COMMERCIAL

## 2022-06-19 NOTE — PROGRESS NOTES
Clinic Care Coordination Contact  [facility]: Post-Discharge Note  SITUATION                                                      Admission:    Admission Date: 06/15/22   Reason for Admission: Difficulty speaking  Discharge:   Discharge Date: 06/17/22  Discharge Diagnosis: Difficulty speaking    BACKGROUND                                                      Per hospital discharge summary and inpatient provider notes:    Cononr Emerson is a 42 year old male who has a PMH of Stage IV NSCLC c/b mets to brain (s/p gamma knife in Feb 2022) on Alectinib, HTN, DM II, and anxiety admitted for aphasia. He describes word finding difficulty over the last few weeks that was worsening in the last few days. He did present to the ED on 6/10 and was planning to be admitted however had worsening anxiety and had to leave prior to admission. Now he is continuing to have symptoms which he describes as being able to think of what he wants to say but not get out the words. He also describes feeling strained when trying to smile or talk. He is also having difficulty writing. His wife was concerned that he might have a mild droop of the right side of his face. She is also quite worried about trying to keep his blood sugar down as it has been much higher when on steroids.      He denies any headaches, vision changes, blurred vision, nausea, abdominal pain, changes in bowel or bladder. He has no changes or difficulty with gait.     ASSESSMENT      Enrollment  Primary Care Care Coordination Status: Declined    Discharge Assessment  How are you doing now that you are home?: I am doing good. The steriods are working and I got my speech back.  How are your symptoms? (Red Flag symptoms escalate to triage hotline per guidelines): Improved  Do you feel your condition is stable enough to be safe at home until your provider visit?: Yes  Does the patient have their discharge instructions? : Yes  Does the patient have questions regarding their  discharge instructions? : No  Were you started on any new medications or were there changes to any of your previous medications? : Yes  Does the patient have all of their medications?: Yes  Do you have questions regarding any of your medications? : No  Do you have all of your needed medical supplies or equipment (DME)?  (i.e. oxygen tank, CPAP, cane, etc.): Yes  Discharge follow-up appointment scheduled within 14 calendar days? : Yes  Discharge Follow Up Appointment Date: 06/20/22  Discharge Follow Up Appointment Scheduled with?: Specialty Care Provider                  PLAN                                                      Outpatient Plan: Follow up with Dr. Moran , at (location with clinic name or city) [place],  within 3 days to evaluate medication change, to evaluate treatment  change, for hospital follow- up, and regarding new diagnosis. The  following labs/tests are recommended: MRI.    Future Appointments   Date Time Provider Department Center   6/20/2022  9:00 AM Stephen Sousa, SLP [facility]   6/20/2022 10:00 AM [facility]U OT WAITLIST [facility]   6/24/2022 10:30 AM Radha Shetty Ra, APRN CNP [facility]   7/5/2022  1:00 PM RSCCCT1 [facility]   7/6/2022  3:00 PM Bassam Persaud MD [facility]   8/1/2022 10:00 AM [facility] [place] [facility]   8/1/2022 11:20 AM Stephen Moran MD [facility]         For any urgent concerns, please contact our 24 hour nurse triage line: [phone] ([phone])       LAURENCE Ortega  [phone]  [facility]

## 2022-06-20 ENCOUNTER — HOSPITAL (OUTPATIENT)
Dept: RADIATION ONCOLOGY | Facility: CLINIC | Age: 42
End: 2022-06-20
Payer: COMMERCIAL

## 2022-06-20 NOTE — CARE PLAN
Speech Language Therapy Discharge Summary    Reason for therapy discharge:    Discharged to home.    Progress towards therapy goal(s). See goals on Care Plan in Epic electronic health record for goal details.  Goals not met.  Barriers to achieving goals:   discharge from facility.    Therapy recommendation(s):    Patient may benefit from ongoing SLP services for language if he feels he continues to be below baseline for overall language function. He verbalized feeling he was naturally improving language skills, but was not sure if it would fully improve. Instructured to follow-up with primary care provider for SLP consult.

## 2022-06-20 NOTE — PROGRESS NOTES
Please see the consult note from 6/17/2022. The patient was discharged before the patient was  seen.    I discussed the patient with the medical team and Dr Bernstein. The patient may have tumor progression vs tumor necrosis. It is uncertain which is the predominant process. The decision regarding surgery should be deferred to NSG team.  If medical management is favored, the patient needs increased steroids and a very short follow up MRI.    FABY Agrawal M.D.  Department of Radiation Oncology  [facility]

## 2022-06-20 NOTE — PLAN OF CARE
Occupational Therapy Discharge Summary    Reason for therapy discharge:    Discharged to home with outpatient therapy.    Progress towards therapy goal(s). See goals on Care Plan in [facility] electronic health record for goal details.  Goals partially met.  Barriers to achieving goals:   discharge from facility.    Therapy recommendation(s):    Continued therapy is recommended.  Rationale/Recommendations:  to maximize functional IND.

## 2022-06-24 ENCOUNTER — OFFICE VISIT (OUTPATIENT)
Dept: FAMILY MEDICINE | Facility: CLINIC | Age: 42
End: 2022-06-24
Payer: COMMERCIAL

## 2022-06-24 ENCOUNTER — OFFICE VISIT (OUTPATIENT)
Dept: NEUROSURGERY | Facility: CLINIC | Age: 42
End: 2022-06-24
Payer: COMMERCIAL

## 2022-06-24 VITALS — HEART RATE: 63 BPM | OXYGEN SATURATION: 97 % | DIASTOLIC BLOOD PRESSURE: 89 MMHG | SYSTOLIC BLOOD PRESSURE: 134 MMHG

## 2022-06-24 VITALS
TEMPERATURE: 98.1 F | OXYGEN SATURATION: 98 % | HEART RATE: 72 BPM | WEIGHT: 203.5 LBS | SYSTOLIC BLOOD PRESSURE: 120 MMHG | DIASTOLIC BLOOD PRESSURE: 68 MMHG | RESPIRATION RATE: 18 BRPM | BODY MASS INDEX: 30.05 KG/M2

## 2022-06-24 DIAGNOSIS — Z79.4 TYPE 2 DIABETES MELLITUS WITHOUT COMPLICATION, WITH LONG-TERM CURRENT USE OF INSULIN (H): Primary | ICD-10-CM

## 2022-06-24 DIAGNOSIS — C79.31 BRAIN METASTASIS: Primary | ICD-10-CM

## 2022-06-24 DIAGNOSIS — F41.9 ANXIETY: ICD-10-CM

## 2022-06-24 DIAGNOSIS — C79.31 BRAIN METASTASIS: ICD-10-CM

## 2022-06-24 DIAGNOSIS — E11.9 TYPE 2 DIABETES MELLITUS WITHOUT COMPLICATION, WITH LONG-TERM CURRENT USE OF INSULIN (H): Primary | ICD-10-CM

## 2022-06-24 DIAGNOSIS — G93.6 VASOGENIC CEREBRAL EDEMA (H): ICD-10-CM

## 2022-06-24 LAB — HBA1C MFR BLD: 12.3 % (ref 0–5.6)

## 2022-06-24 PROCEDURE — 83036 HEMOGLOBIN GLYCOSYLATED A1C: CPT | Performed by: NURSE PRACTITIONER

## 2022-06-24 PROCEDURE — 99214 OFFICE O/P EST MOD 30 MIN: CPT | Performed by: NEUROLOGICAL SURGERY

## 2022-06-24 PROCEDURE — 36415 COLL VENOUS BLD VENIPUNCTURE: CPT | Performed by: NURSE PRACTITIONER

## 2022-06-24 PROCEDURE — 99495 TRANSJ CARE MGMT MOD F2F 14D: CPT | Performed by: NURSE PRACTITIONER

## 2022-06-24 RX ORDER — CITALOPRAM HYDROBROMIDE 20 MG/1
20 TABLET ORAL EVERY EVENING
Qty: 90 TABLET | Refills: 0 | Status: SHIPPED | OUTPATIENT
Start: 2022-06-24 | End: 2022-11-22

## 2022-06-24 RX ORDER — DEXAMETHASONE 4 MG/1
4 TABLET ORAL
Qty: 30 TABLET | Refills: 3 | Status: ON HOLD | COMMUNITY
Start: 2022-06-24 | End: 2022-06-30

## 2022-06-24 ASSESSMENT — PAIN SCALES - GENERAL
PAINLEVEL: NO PAIN (0)
PAINLEVEL: NO PAIN (0)

## 2022-06-24 ASSESSMENT — ANXIETY QUESTIONNAIRES
5. BEING SO RESTLESS THAT IT IS HARD TO SIT STILL: SEVERAL DAYS
2. NOT BEING ABLE TO STOP OR CONTROL WORRYING: NOT AT ALL
7. FEELING AFRAID AS IF SOMETHING AWFUL MIGHT HAPPEN: NOT AT ALL
GAD7 TOTAL SCORE: 6
4. TROUBLE RELAXING: SEVERAL DAYS
1. FEELING NERVOUS, ANXIOUS, OR ON EDGE: SEVERAL DAYS
6. BECOMING EASILY ANNOYED OR IRRITABLE: SEVERAL DAYS
GAD7 TOTAL SCORE: 6
7. FEELING AFRAID AS IF SOMETHING AWFUL MIGHT HAPPEN: NOT AT ALL
3. WORRYING TOO MUCH ABOUT DIFFERENT THINGS: MORE THAN HALF THE DAYS
8. IF YOU CHECKED OFF ANY PROBLEMS, HOW DIFFICULT HAVE THESE MADE IT FOR YOU TO DO YOUR WORK, TAKE CARE OF THINGS AT HOME, OR GET ALONG WITH OTHER PEOPLE?: NOT DIFFICULT AT ALL
GAD7 TOTAL SCORE: 6

## 2022-06-24 NOTE — PATIENT INSTRUCTIONS
Patient Instructions    Surgery scheduled for Left stealth Craniotomy and tumor resection with Stephen Moran MD     Pre-Operative   You will need a Stereotactic MRI 1-3 days before surgery. The Surgery Scheduler will help coordinate this appointment.      Surgical risks: blood clots in the leg or lung, problems urinating, nerve damage, drainage from the incision, infection,stiffness  Pre-operative physical with primary care physician within 30 days of surgical date.   You will spend 2-3 days in hospital   Shower  You will need to shower the night before and morning of surgery using the antimicrobial soap given to you  You can wash your hair using a gently shampoo such as [employer]  Eating/Drinking  Stop all solid foods 8 hours before surgery.  Keep drinking clear liquids until 4 hours before surgery. Clear liquids include water, clear juice, black coffee, or clear tea without milk, Gatorade, clear soda  Medications  Discontinue Aspirin, NSAIDs (Advil/Ibuprofen, Naproxen,Nuprin,Relafen/Nabumetone, Diclofenac,Meloxicam, Aleve, Celebrex) x 7 days prior to surgical date.  May try tylenol for pain 1000 mg three times per day for pain  Discontinue Plavix x 7-10 days prior to surgical date, stay off Plavix 3-4 days post operatively  Discontinue Xarelto 5-7 days prior to surgery  Discontinue coumadin/warfarin 5-7 days prior to surgery. INR needs to be <1.4.   If you are on chronic pain medication (oxycodone, Percocet, hydrocodone, Vicodin, Norco, Dilaudid, morphine, MS Contin, naltrexone, Suboxone, etc) or have a pain contract you need  to contact the pain medication prescriber and/or the prescriber who holds the pain contract with you. A plan needs to be created between you and the provider on how to take your chronic pain medication leading up to surgery and what you will be taking to control pain/who will be prescribing that pain medication during recovery.    As part of preparation for your upcoming procedure  you are required to have a test for the novel Coronavirus, COVID-19  Please call the drive-up testing number to schedule your appointment. The test needs to be completed within 4 days (96) hours of surgery.   Scheduling Number: [phone]   You may NOT receive the COVID-19 vaccine 72 hours before or after surgery.      Post-Operative  Incision Care  You may get your incision wet in the shower. Use a gentle shampoo with no fragrance, such as baby shampoo, until incision is completely healed. Allow soap and water to run over the incision, and gently pat it dry.   Do not soak in a bath, hot tub, or pool until the incision is completely healed (4-6 weeks after surgery)  Avoid coloring your hair or permanent styling until cleared by your surgeon  Pain Management  Dealing with pain  As your body heals, you might feel a stabbing, burning, or aching pain. You may also have some numbness.  Everyone feels pain differently, we may ask you to rate your pain using a pain scale. This will let us know how much pain you feel.   Keep in mind that medicine won't take away all of your pain. It helps to try other ways to relax and ease pain.   Things to help with pain  After surgery, we will give you medicine for your pain. These medications work well, but they can make you drowsy, itchy, or sick to your stomach. If we give you narcotics for pain, try to take the pills with food.   For mild to moderate pain, you can take medication such as Tylenol. These can be used with narcotics, but make sure that your narcotic does not contain Tylenol.   Do NOT drive while taking narcotic pain medication  Do NOT drink alcohol while using any pain medication  You can utilize ice as needed (no longer than 20 minutes at one time)  Refills: please call a few days before you run out.  Activity/Restrictions  We encourage short frequent walks, increasing as tolerated  No driving until you are seen in clinic and cleared by your neurosurgeon or while on  narcotics.  If you have had a seizure, you may not drive for at least 3 months according to [place] law.  No strenuous activity  No lifting more than 10 pounds until you are seen in clinic (a gallon of milk weighs approximately 8 pounds)   Results/Steroids  If you have not received the results of the pathology (diagnosis) at the time of discharge, our office will call you with these results as soon as they become available, or we will discuss them with you during your clinic visit, whichever is first.   If you are on a steroid medication (decadron or dexamethasone) please follow the detailed instructions with the prescription to slowly decrease the amount you are taking.     Post-Op Appointments  You will need to schedule an appointment with one of our nurses for suture/staple removal at one of our clinic locations 2 weeks after your surgery. If you live far away, you may see your primary care doctor for a wound check at 2 weeks.   Follow up in clinic at 6 weeks and again at 12 weeks (3 months). You will need to obtain imaging prior to appointment.  Please call our clinic at [phone] to schedule an appointment with the Neurosurgery teams.     Resources  Go to the nearest Emergency Room for Evaluation if you develop:  Acute changes in the level of consciousness (increased confusion, memory loss, speech abnormalities)  Any change in hearing or vision  Increased headaches  New onset of seizures.  Please call if you have:  Increased pain, redness, drainage, swelling at your incision  Fevers > 101.5 F degrees  Please call the clinic at [phone] and ask for the NURSE LINE      [facility]  Phone: [phone]  Fax: [phone]

## 2022-06-24 NOTE — PROGRESS NOTES
"  Assessment & Plan     Type 2 diabetes mellitus without complication, with long-term current use of insulin (H)  Discussed importance of better control.  Scheduled with MTM.  Stressed importance of appt.  His symptoms are improving.  Labs today.  - Hemoglobin A1c; Future  - Med Therapy Management Referral  - Hemoglobin A1c    Anxiety  Discussed with neurosurgeon.  Trial celexa for anxiety.  - citalopram (CELEXA) 20 MG tablet; Take 1 tablet (20 mg) by mouth every evening    Brain metastasis (H)  Following with neurosurgery.               BMI:   Estimated body mass index is 30.05 kg/m  as calculated from the following:    Height as of 6/15/22: 1.753 m (5' 9\").    Weight as of this encounter: 92.3 kg (203 lb 8 oz).           No follow-ups on file.    Radha Shetty, JERRY CNP  [facility] [place] Ryan is a 42 year old, presenting for the following health issues:  Hypertension, Hospital F/U, Diabetes, Pre-Op Exam (Needs to discuss pre-op), and Anxiety (Would like to discuss getting medication for anxiety)      History of Present Illness       Reason for visit:  Check up    He eats 0-1 servings of fruits and vegetables daily.He consumes 1 sweetened beverage(s) daily. He exercises with enough effort to increase his heart rate 3 or less days per week.   He is taking medications regularly.  Today's SADIA-7 Score: 6       Post Discharge Outreach 6/19/2022   Admission Date 6/15/2022   Reason for Admission Difficulty speaking   Discharge Date 6/17/2022   Discharge Diagnosis Difficulty speaking   How are you doing now that you are home? I am doing good. The steriods are working and I got my speech back.   How are your symptoms? (Red Flag symptoms escalate to triage hotline per guidelines) Improved   Do you feel your condition is stable enough to be safe at home until your provider visit? Yes   Does the patient have their discharge instructions?  Yes   Does the patient have questions regarding " their discharge instructions?  No   Were you started on any new medications or were there changes to any of your previous medications?  Yes   Does the patient have all of their medications? Yes   Do you have questions regarding any of your medications?  No   Do you have all of your needed medical supplies or equipment (DME)?  (i.e. oxygen tank, CPAP, cane, etc.) Yes   Discharge follow-up appointment scheduled within 14 calendar days?  Yes   Discharge Follow Up Appointment Date 6/20/2022   Discharge Follow Up Appointment Scheduled with? Specialty Care Provider     Hospital Follow-up Visit:    Hospital/Nursing Home/IP Rehab Facility: [facility]  Date of Admission: 06/15/2022  Date of Discharge: 06/17/2022   Reason(s) for Admission: Difficulty speaking     Was your hospitalization related to COVID-19? No   Problems taking medications regularly:  None  Medication changes since discharge: steroid changed to once a day today  Problems adhering to non-medication therapy:  None    Summary of hospitalization:  [facility] discharge summary reviewed  Diagnostic Tests/Treatments reviewed.  Follow up needed: none  Other Healthcare Providers Involved in Patient s Care:         None  Update since discharge: stable.     Stable/improving.  Following with neurosurgery.  Planned resection of brain lesion.  Speech improved.  He is breathing easier, believes residual breathing issues relate more to increased anxiety.  Interested in treating anxiety.  No chest pain.  His weight has stabilized, feels he is less thirsty and not urinating as much.  Wife helping with med management.    Post Discharge Medication Reconciliation: discharge medications reconciled and changed, per note/orders.  Plan of care communicated with patient         Review of Systems   Constitutional, HEENT, cardiovascular, pulmonary, gi and gu systems are negative, except as otherwise noted.      Objective     /68 (BP Location: Right arm, Patient Position: Sitting, Cuff Size: Adult Regular)   Pulse 72   Temp 98.1  F (36.7  C) (Oral)   Resp 18   Wt 92.3 kg (203 lb 8 oz)   SpO2 98%   BMI 30.05 kg/m    Body mass index is 30.05 kg/m .  Physical Exam   GENERAL: healthy, alert and no distress  NECK: no adenopathy, no asymmetry, masses, or scars and thyroid normal to palpation  RESP: lungs clear to auscultation - no rales, rhonchi or wheezes  CV: regular rate and rhythm, normal S1 S2, no S3 or S4, no murmur, click or rub, no peripheral edema and peripheral pulses strong  ABDOMEN: soft, nontender, no hepatosplenomegaly, no masses and bowel sounds normal  MS: no gross musculoskeletal defects noted, no edema      Last Comprehensive Metabolic Panel:  Sodium   Date Value Ref Range Status   06/27/2022 137 133 - 144 mmol/L Final   05/12/2020 135 133 - 144 mmol/L Final     Potassium   Date Value Ref Range Status   06/28/2022 3.8 3.4 - 5.3 mmol/L Final   05/12/2020 3.9 3.4 - 5.3 mmol/L Final     Chloride   Date Value Ref Range Status   06/27/2022 101 94 - 109 mmol/L Final   05/12/2020 98 94 - 109 mmol/L Final     Carbon Dioxide   Date Value Ref Range Status   05/12/2020 28 20 - 32 mmol/L Final     Carbon Dioxide (CO2)   Date Value Ref Range Status   06/27/2022 29 20 - 32 mmol/L Final     Anion Gap   Date Value Ref Range Status   06/27/2022 7 3 - 14 mmol/L Final   05/12/2020 9 3 - 14 mmol/L Final     Glucose   Date Value Ref Range Status   06/28/2022 370 (H) 70 - 99 mg/dL Final   05/12/2020 490 (H) 70 - 99 mg/dL Final     Comment:     Results confirmed by repeat test  Critical Value called to and read back by  REESE GRANADO ON 05.12.20 BY KM 1340  Fasting specimen  CARSON LEDESMA 1347 5/12/20WD       GLUCOSE BY METER POCT   Date Value Ref Range Status   06/30/2022 311 (H) 70 - 99 mg/dL Final     Urea Nitrogen   Date Value Ref Range Status   06/27/2022 14 7 - 30 mg/dL Final   05/12/2020 20 7 - 30 mg/dL Final     Creatinine   Date  Value Ref Range Status   06/27/2022 0.58 (L) 0.66 - 1.25 mg/dL Final   05/12/2020 0.66 0.66 - 1.25 mg/dL Final     GFR Estimate   Date Value Ref Range Status   06/27/2022 >90 >60 mL/min/1.73m2 Final     Comment:     Effective December 21, 2021 eGFRcr in adults is calculated using the 2021 CKD-EPI creatinine equation which includes age and gender (Minh et al., NE, DOI: 10.1056/VYQZwv3309158)   05/12/2020 >90 >60 mL/min/[1.73_m2] Final     Comment:     Non  GFR Calc  Starting 12/18/2018, serum creatinine based estimated GFR (eGFR) will be   calculated using the Chronic Kidney Disease Epidemiology Collaboration   (CKD-EPI) equation.       Calcium   Date Value Ref Range Status   06/27/2022 8.6 8.5 - 10.1 mg/dL Final   05/12/2020 9.8 8.5 - 10.1 mg/dL Final     Bilirubin Total   Date Value Ref Range Status   06/14/2022 0.8 0.2 - 1.3 mg/dL Final   08/10/2018 0.7 0.2 - 1.3 mg/dL Final     Alkaline Phosphatase   Date Value Ref Range Status   06/14/2022 134 40 - 150 U/L Final   08/10/2018 79 40 - 150 U/L Final     ALT   Date Value Ref Range Status   06/14/2022 20 0 - 70 U/L Final   08/10/2018 28 0 - 70 U/L Final     AST   Date Value Ref Range Status   06/14/2022 13 0 - 45 U/L Final   08/10/2018 19 0 - 45 U/L Final             Lab Results   Component Value Date    A1C 12.3 06/24/2022    A1C 10.1 01/19/2022    A1C 11.9 05/12/2020    A1C 6.3 08/10/2018    A1C 6.5 11/27/2017    A1C 6.4 06/29/2017    A1C 6.5 02/13/2017                     .  ..

## 2022-06-24 NOTE — NURSING NOTE
Reviewed pre- and post-operative instructions as outlined in the After Visit Summary/Patient Instructions with patient.   Surgery folder was given to patient    Patient Education Topic: Procedure with Dr. Moran     Learner(s): Patient    Knowledge Level: Basic    Readiness to Learn: Ready    Method:  Verbal explanation and Written material     Outcome: Able to verbalize instructions    Barriers to Learning: No barrier    Covid Testing: patient educated they will need to have a test for the novel Coronavirus, COVID-19.The PCR test needs to be completed within 4 days (96) hours of surgery. . Order Placed.  Scheduling Number: [phone]    NDI/VERN: n/a    Patient had the opportunity for questions to be answered. Advised Patient and Significant other/Spouse  to call clinic with any questions/concerns. Gave patient antibacterial soap for pre-surgery skin preparation.

## 2022-06-24 NOTE — PROGRESS NOTES
"It was a pleasure to see Connor Emerson today in Neurosurgery Clinic. He is a 42 year old male who has a history of radiosurgery to multiple brain metastasis from his lung cancer.    He was admitted to the hospital approximately 2 weeks ago with significant cerebral edema and a larger left frontal lesion.  The other previously treated lesions appeared stable.  He improved significantly on dexamethasone and is here today to follow-up for further discussion of treatment.    He is currently on 4 mg 3 times daily of dexamethasone and notices some side effects from this.    Vitals:    06/24/22 0816   BP: 134/89   Pulse: 63   SpO2: 97%     Estimated body mass index is 30.08 kg/m  as calculated from the following:    Height as of 6/15/22: 1.753 m (5' 9\").    Weight as of 6/17/22: 92.4 kg (203 lb 11.2 oz).  No Pain (0)    Awake alert and oriented.  Bilateral upper and lower extremity strength is 5 out of 5 in all muscle groups.    No pronator drift  Symmetrical over the hands and index fingers.    Imaging: Review of his MRI from June and May shows that the lesion in the left frontal region appears to have increased in size with significant cerebral edema and brain compression.  While this could certainly represent radiation necrosis might also represent recurrent tumor.  The imaging was reviewed with the patient shown the patient clinic today.    Assessment: Recurrent metastasis versus cerebral edema.    Plan: Given his symptoms and his need for significant steroids and the increasing size of the lesion I think that a craniotomy for resection of the lesion would be beneficial for 2 reasons.  It would help identify the pathology of lesion whether this is necrosis versus recurrent tumor.  It would also likely speed his ability to wean off of steroids.  We also discussed a watchful waiting with repeat imaging in 4 to 6 weeks while weaning the steroids.  Overall I would favor a craniotomy.    The risks benefits and alternatives " to the procedure were discussed. Risks include, but are not limited to, bleeding, infection, neurologic injury or stroke, seizure, spinal fluid leakage, or need for further surgery or inability to achieve the surgical goals, possible off-label use of substances or devices. Questions were solicited and answered, and the patient wishes to proceed with surgery.

## 2022-06-24 NOTE — LETTER
"    6/24/2022         RE: Connor Emerson  [address]  [facility] [address]        Dear Colleague,    Thank you for referring your patient, Connor Emerson, to the [facility]. Please see a copy of my visit note below.    It was a pleasure to see Connor Emerson today in Neurosurgery Clinic. He is a 42 year old male who has a history of radiosurgery to multiple brain metastasis from his lung cancer.    He was admitted to the hospital approximately 2 weeks ago with significant cerebral edema and a larger left frontal lesion.  The other previously treated lesions appeared stable.  He improved significantly on dexamethasone and is here today to follow-up for further discussion of treatment.    He is currently on 4 mg 3 times daily of dexamethasone and notices some side effects from this.    Vitals:    06/24/22 0816   BP: 134/89   Pulse: 63   SpO2: 97%     Estimated body mass index is 30.08 kg/m  as calculated from the following:    Height as of 6/15/22: 1.753 m (5' 9\").    Weight as of 6/17/22: 92.4 kg (203 lb 11.2 oz).  No Pain (0)    Awake alert and oriented.  Bilateral upper and lower extremity strength is 5 out of 5 in all muscle groups.    No pronator drift  Symmetrical over the hands and index fingers.    Imaging: Review of his MRI from June and May shows that the lesion in the left frontal region appears to have increased in size with significant cerebral edema and brain compression.  While this could certainly represent radiation necrosis might also represent recurrent tumor.  The imaging was reviewed with the patient shown the patient clinic today.    Assessment: Recurrent metastasis versus cerebral edema.    Plan: Given his symptoms and his need for significant steroids and the increasing size of the lesion I think that a craniotomy for resection of the lesion would be beneficial for 2 reasons.  It would help identify the pathology of lesion whether this is necrosis " versus recurrent tumor.  It would also likely speed his ability to wean off of steroids.  We also discussed a watchful waiting with repeat imaging in 4 to 6 weeks while weaning the steroids.  Overall I would favor a craniotomy.    The risks benefits and alternatives to the procedure were discussed. Risks include, but are not limited to, bleeding, infection, neurologic injury or stroke, seizure, spinal fluid leakage, or need for further surgery or inability to achieve the surgical goals, possible off-label use of substances or devices. Questions were solicited and answered, and the patient wishes to proceed with surgery.             Again, thank you for allowing me to participate in the care of your patient.        Sincerely,        Stephen Moran MD

## 2022-06-26 ENCOUNTER — MYC MEDICAL ADVICE (OUTPATIENT)
Dept: NEUROSURGERY | Facility: CLINIC | Age: 42
End: 2022-06-26

## 2022-06-27 ENCOUNTER — HOSPITAL ENCOUNTER (OUTPATIENT)
Dept: MRI IMAGING | Facility: CLINIC | Age: 42
Discharge: HOME | End: 2022-06-27
Attending: NEUROLOGICAL SURGERY | Admitting: NEUROLOGICAL SURGERY
Payer: COMMERCIAL

## 2022-06-27 ENCOUNTER — OFFICE VISIT (OUTPATIENT)
Dept: FAMILY MEDICINE | Facility: CLINIC | Age: 42
End: 2022-06-27
Payer: COMMERCIAL

## 2022-06-27 VITALS
TEMPERATURE: 97.6 F | HEART RATE: 55 BPM | DIASTOLIC BLOOD PRESSURE: 81 MMHG | OXYGEN SATURATION: 97 % | SYSTOLIC BLOOD PRESSURE: 129 MMHG | WEIGHT: 203.6 LBS | BODY MASS INDEX: 30.07 KG/M2

## 2022-06-27 DIAGNOSIS — C79.31 BRAIN METASTASIS: Primary | ICD-10-CM

## 2022-06-27 DIAGNOSIS — Z79.4 TYPE 2 DIABETES MELLITUS WITH HYPERGLYCEMIA, WITH LONG-TERM CURRENT USE OF INSULIN (H): ICD-10-CM

## 2022-06-27 DIAGNOSIS — C34.31 MALIGNANT NEOPLASM OF LOWER LOBE OF RIGHT LUNG (H): ICD-10-CM

## 2022-06-27 DIAGNOSIS — C79.31 BRAIN METASTASIS: ICD-10-CM

## 2022-06-27 DIAGNOSIS — E11.65 TYPE 2 DIABETES MELLITUS WITH HYPERGLYCEMIA, WITH LONG-TERM CURRENT USE OF INSULIN (H): ICD-10-CM

## 2022-06-27 DIAGNOSIS — G93.6 VASOGENIC CEREBRAL EDEMA (H): ICD-10-CM

## 2022-06-27 LAB
ANION GAP SERPL CALCULATED.3IONS-SCNC: 7 MMOL/L (ref 3–14)
BUN SERPL-MCNC: 14 MG/DL (ref 7–30)
CALCIUM SERPL-MCNC: 8.6 MG/DL (ref 8.5–10.1)
CHLORIDE BLD-SCNC: 101 MMOL/L (ref 94–109)
CO2 SERPL-SCNC: 29 MMOL/L (ref 20–32)
CREAT SERPL-MCNC: 0.58 MG/DL (ref 0.66–1.25)
ERYTHROCYTE [DISTWIDTH] IN BLOOD BY AUTOMATED COUNT: 13.4 % (ref 10–15)
GFR SERPL CREATININE-BSD FRML MDRD: >90 ML/MIN/1.73M2
GLUCOSE BLD-MCNC: 451 MG/DL (ref 70–99)
HCT VFR BLD AUTO: 44.9 % (ref 40–53)
HGB BLD-MCNC: 15 G/DL (ref 13.3–17.7)
MCH RBC QN AUTO: 31.3 PG (ref 26.5–33)
MCHC RBC AUTO-ENTMCNC: 33.4 G/DL (ref 31.5–36.5)
MCV RBC AUTO: 94 FL (ref 78–100)
PLATELET # BLD AUTO: 218 10E3/UL (ref 150–450)
POTASSIUM BLD-SCNC: 4.2 MMOL/L (ref 3.4–5.3)
RBC # BLD AUTO: 4.79 10E6/UL (ref 4.4–5.9)
SARS-COV-2 RNA RESP QL NAA+PROBE: NEGATIVE
SODIUM SERPL-SCNC: 137 MMOL/L (ref 133–144)
WBC # BLD AUTO: 17 10E3/UL (ref 4–11)

## 2022-06-27 PROCEDURE — A9585 GADOBUTROL INJECTION: HCPCS | Performed by: NEUROLOGICAL SURGERY

## 2022-06-27 PROCEDURE — 80048 BASIC METABOLIC PNL TOTAL CA: CPT | Performed by: NURSE PRACTITIONER

## 2022-06-27 PROCEDURE — 70552 MRI BRAIN STEM W/DYE: CPT | Mod: 26 | Performed by: STUDENT IN AN ORGANIZED HEALTH CARE EDUCATION/TRAINING PROGRAM

## 2022-06-27 PROCEDURE — 255N000002 HC RX 255 OP 636: Performed by: NEUROLOGICAL SURGERY

## 2022-06-27 PROCEDURE — 85027 COMPLETE CBC AUTOMATED: CPT | Performed by: NURSE PRACTITIONER

## 2022-06-27 PROCEDURE — 70552 MRI BRAIN STEM W/DYE: CPT

## 2022-06-27 PROCEDURE — 36415 COLL VENOUS BLD VENIPUNCTURE: CPT | Performed by: NURSE PRACTITIONER

## 2022-06-27 PROCEDURE — 99214 OFFICE O/P EST MOD 30 MIN: CPT | Performed by: NURSE PRACTITIONER

## 2022-06-27 PROCEDURE — U0005 INFEC AGEN DETEC AMPLI PROBE: HCPCS | Performed by: NURSE PRACTITIONER

## 2022-06-27 PROCEDURE — U0003 INFECTIOUS AGENT DETECTION BY NUCLEIC ACID (DNA OR RNA); SEVERE ACUTE RESPIRATORY SYNDROME CORONAVIRUS 2 (SARS-COV-2) (CORONAVIRUS DISEASE [COVID-19]), AMPLIFIED PROBE TECHNIQUE, MAKING USE OF HIGH THROUGHPUT TECHNOLOGIES AS DESCRIBED BY CMS-2020-01-R: HCPCS | Performed by: NURSE PRACTITIONER

## 2022-06-27 RX ORDER — GADOBUTROL 604.72 MG/ML
10 INJECTION INTRAVENOUS ONCE
Status: COMPLETED | OUTPATIENT
Start: 2022-06-27 | End: 2022-06-27

## 2022-06-27 RX ADMIN — GADOBUTROL 9 ML: 604.72 INJECTION INTRAVENOUS at 07:46

## 2022-06-27 ASSESSMENT — PAIN SCALES - GENERAL: PAINLEVEL: NO PAIN (0)

## 2022-06-27 NOTE — PROGRESS NOTES
[facility]  [address] [facility] [address]  Phone: [phone]  Primary Provider: Cyrus Morrison Ra  Pre-op Performing Provider: CYRUS MORRISON RA      PREOPERATIVE EVALUATION:  Today's date: 6/27/2022    Connor Emerson is a 42 year old male who presents for a preoperative evaluation.    Surgical Information:  Surgery/Procedure: Left stealth craniotomy for tumor resection with motor mapping  Surgery Location: [facility]  Surgeon: Stephen Moran MD  Surgery Date: 6/28/2022  Time of Surgery: 12:30 PM  Where patient plans to recover: At home with family  Fax number for surgical facility: Note does not need to be faxed, will be available electronically in Epic.    Type of Anesthesia Anticipated: General    Assessment & Plan     The proposed surgical procedure is considered INTERMEDIATE risk.    Brain metastasis (H)  Surgery planned.  - Asymptomatic COVID-19 Virus (Coronavirus) by PCR Nose    Vasogenic cerebral edema (H)  Steroids currently.  - Asymptomatic COVID-19 Virus (Coronavirus) by PCR Nose    Type 2 diabetes mellitus with hyperglycemia, with long-term current use of insulin (H)  Poor control, improving.  Visit with MTM later today to adjust treatment plan.  - insulin glargine (LANTUS PEN) 100 UNIT/ML pen; Inject 20 Units Subcutaneous 2 times daily  - Basic metabolic panel  (Ca, Cl, CO2, Creat, Gluc, K, Na, BUN); Future  - CBC with platelets; Future  - Basic metabolic panel  (Ca, Cl, CO2, Creat, Gluc, K, Na, BUN)  - CBC with platelets    Malignant neoplasm of lower lobe of right lung (H)  Following with oncology.           Risks and Recommendations:  The patient has the following additional risks and recommendations for perioperative complications:   - Consult Hospitalist / IM to assist with post-op medical management   - Recurrent use of steroids  Diabetes:  Meeting with MTM to discuss adjustment of treatment plan as well as preprocedure  management.        RECOMMENDATION:  APPROVAL GIVEN to proceed with proposed procedure, without further diagnostic evaluation as procedure is necessary now despite poorly controlled blood sugars.  Unfortunately, the patient did not come to his appointment with the medication management pharmacist this afternoon.  Anticipate delay healing due to elevated blood sugars.  Will need additional care following surgery to regulate blood sugars as his treatment plan needs to be updated, and we were unable to do this for him today.  Also, we were unable to leave a voicemail for the patient as his mailbox was full.          Subjective     HPI related to upcoming procedure: history of radiosurgery to multiple brain metastasis from lung cancer.  Recent significant cerebral edema and larger L frontal lesion.  Treated with PO steroid.  Craniotomy for resection of lesion planned.      Preop Questions 6/27/2022   1. Have you ever had a heart attack or stroke? No   2. Have you ever had surgery on your heart or blood vessels, such as a stent placement, a coronary artery bypass, or surgery on an artery in your head, neck, heart, or legs? No   3. Do you have chest pain with activity? No   4. Do you have a history of  heart failure? No   5. Do you currently have a cold, bronchitis or symptoms of other infection? No   6. Do you have a cough, shortness of breath, or wheezing? No   7. Do you or anyone in your family have previous history of blood clots? No   8. Do you or does anyone in your family have a serious bleeding problem such as prolonged bleeding following surgeries or cuts? No   9. Have you ever had problems with anemia or been told to take iron pills? No   10. Have you had any abnormal blood loss such as black, tarry or bloody stools? No   11. Have you ever had a blood transfusion? No   12. Are you willing to have a blood transfusion if it is medically needed before, during, or after your surgery? NO -    13. Have you or any of  your relatives ever had problems with anesthesia? No   14. Do you have sleep apnea, excessive snoring or daytime drowsiness? No   15. Do you have any artifical heart valves or other implanted medical devices like a pacemaker, defibrillator, or continuous glucose monitor? No   16. Do you have artificial joints? No   17. Are you allergic to latex? No       Health Care Directive:  Patient does not have a Health Care Directive or Living Will:     Preoperative Review of :   reviewed - controlled substances reflected in medication list.      Status of Chronic Conditions:    Diabetes  Poor control, although improving.  Continued steroid use.  Compliant with BID long acting insulin.  Meeting with MTM this afternoon to discuss mealtime insulin.  Improving polyuria and polydipsia.  Weight has stabilized.       Fasting sugars have been coming down.  Consistently now around 250 when fasting.    Weight has stabilized.  Excessive thirst has decreased as well as urinary frequency.    No chest pain.  Breathing is stable.  Anxiety increased, will be starting new medication.      Review of Systems  Constitutional, neuro, ENT, endocrine, pulmonary, cardiac, gastrointestinal, genitourinary, musculoskeletal, integument and psychiatric systems are negative, except as otherwise noted.    Patient Active Problem List    Diagnosis Date Noted     Complication of chest tube, initial encounter 04/02/2022     Priority: Medium     Severe sepsis (H) 04/02/2022     Priority: Medium     Brain metastasis (H) 02/18/2022     Priority: Medium     Malignant neoplasm of lower lobe of right lung (H) 02/15/2022     Priority: Medium     Pleural effusion on right 01/19/2022     Priority: Medium     Morbid obesity (H) 11/27/2017     Priority: Medium     Cellulitis of hand, right 04/27/2017     Priority: Medium     Lateral epicondylitis of right elbow 11/11/2016     Priority: Medium     Adjustment disorder with mixed anxiety and depressed mood 04/23/2016      Priority: Medium     Type 2 diabetes mellitus without complication (H) 10/27/2015     Priority: Medium     Intractable chronic migraine without aura 01/08/2015     Priority: Medium     Problem list name updated by automated process. Provider to review       GERD (gastroesophageal reflux disease) 12/02/2013     Priority: Medium     Atypical chest pain 12/02/2013     Priority: Medium     Anxiety 07/09/2013     Priority: Medium     HTN, goal below 140/90 07/02/2013     Priority: Medium     Obesity 11/26/2012     Priority: Medium     Insomnia 02/21/2012     Priority: Medium     Low back pain 02/04/2012     Priority: Medium     Diagnosis updated by automated process. Provider to review and confirm.       CARDIOVASCULAR SCREENING; LDL GOAL LESS THAN 160 10/28/2011     Priority: Medium      Past Medical History:   Diagnosis Date     Atypical chest pain 12/02/2013     Cancer (H)      Complication of anesthesia      Diabetes (H)      GERD (gastroesophageal reflux disease) 12/02/2013     HTN (hypertension) 05/14/2012     HTN, goal below 140/90 07/02/2013     Insomnia 02/21/2012     Mediastinal lymphadenopathy      Migraine headache 07/02/2013     Migraine with aura, without mention of intractable migraine without mention of status migrainosus      Pneumonia      Past Surgical History:   Procedure Laterality Date     BRONCHOSCOPY RIGID OR FLEXIBLE W/TRANSENDOSCOPIC ENDOBRONCHIAL ULTRASOUND GUIDED Bilateral 1/26/2022    Procedure: Right BRONCHOSCOPY, FIBEROPTIC, endobronchial ultrasound, pleural biopsy;  Surgeon: Dallin Agrawal MD;  Location: [facility] OR     INJECT BLOCK MEDIAL BRANCH CERVICAL/THORACIC/LUMBAR       INSERT CHEST TUBE Right 2/16/2022    Procedure: INSERTION, CATHETER, INTERCOSTAL, FOR DRAINAGE;  Surgeon: Dallin Agrawal MD;  Location: [facility] GI     INSERT CHEST TUBE Right 3/9/2022    Procedure: INSERTION, CATHETER, INTERCOSTAL, FOR DRAINAGE;  Surgeon: Sushila Antonio MD;  Location: [facility] GI     IR CHEST TUBE REMOVAL  TUNNELED RIGHT  4/2/2022     ORTHOPEDIC SURGERY      Ganesh. Rotator cuff repair.     PLEUROSCOPY N/A 1/26/2022    Procedure: Pleuroscopy with Pleural Biopsy;  Surgeon: Dallin Agrawal MD;  Location: [facility] OR     Current Outpatient Medications   Medication Sig Dispense Refill     alcohol swab prep pads Use to swab area of injection/jabier as directed. 100 each 3     alectinib (ALECENSA) 150 MG CAPS Take 3 capsules (450 mg) by mouth 2 times daily (with meals) 180 capsule 11     blood glucose (NO BRAND SPECIFIED) lancets standard Use to test blood sugar 1 times daily or as directed. 100 each 0     blood glucose (NO BRAND SPECIFIED) test strip Use to test blood sugar 2 times daily or as directed. To accompany: Blood Glucose Monitor Brands: per insurance. 100 strip 6     blood glucose (NO BRAND SPECIFIED) test strip Use to test blood sugar 1 times daily or as directed. 1 Box 0     blood glucose calibration (NO BRAND SPECIFIED) solution To accompany: Blood Glucose Monitor Brands: per insurance. 1 each 0     blood glucose monitoring (NO BRAND SPECIFIED) meter device kit Use to test blood sugar 2 times daily or as directed. Preferred blood glucose meter OR supplies to accompany: Blood Glucose Monitor Brands: per insurance. 1 kit 0     blood glucose monitoring (NO BRAND SPECIFIED) meter device kit Use to test blood sugar 1 times daily or as directed. 1 kit 0     citalopram (CELEXA) 20 MG tablet Take 1 tablet (20 mg) by mouth every evening 90 tablet 0     dexamethasone (DECADRON) 4 MG tablet Take 1 tablet (4 mg) by mouth daily (with breakfast) 30 tablet 3     famotidine (PEPCID) 40 MG tablet Take 40 mg by mouth daily       hydrochlorothiazide (HYDRODIURIL) 25 MG tablet Take 1 tablet (25 mg) by mouth in the morning. 30 tablet 3     insulin glargine (LANTUS PEN) 100 UNIT/ML pen Inject 20 Units Subcutaneous At Bedtime 15 mL 0     insulin pen needle (31G X 8 MM) 31G X 8 MM miscellaneous Use one pen needles daily or as directed. 100  each 0     levETIRAcetam (KEPPRA) 1000 MG tablet Take 1 tablet (1,000 mg) by mouth 2 times daily 60 tablet 1     metoclopramide (REGLAN) 5 MG tablet Take 1 tablet (5 mg) by mouth 3 times daily as needed (hiccups) 30 tablet 1     oxyCODONE (ROXICODONE) 5 MG tablet Take 1 tablet (5 mg) by mouth every 6 hours as needed for pain 30 tablet 0     prochlorperazine (COMPAZINE) 10 MG tablet Take 1 tablet (10 mg) by mouth every 6 hours as needed (Nausea/Vomiting) 30 tablet 2     thin (NO BRAND SPECIFIED) lancets Use with lanceting device. To accompany: Blood Glucose Monitor Brands: per insurance. 100 each 6     nystatin (MYCOSTATIN) 760853 UNIT/ML suspension Take 2 mLs (200,000 Units) by mouth 4 times daily (Patient not taking: No sig reported) 60 mL 1       Allergies   Allergen Reactions     Vicodin [Hydrocodone-Acetaminophen] Nausea and Vomiting and GI Disturbance        Social History     Tobacco Use     Smoking status: Never Smoker     Smokeless tobacco: Never Used   Substance Use Topics     Alcohol use: Yes     Alcohol/week: 0.0 standard drinks     Comment: social     Family History   Problem Relation Age of Onset     Unknown/Adopted Mother      Uterine Cancer Mother      Unknown/Adopted Father      Unknown/Adopted Maternal Grandmother      Unknown/Adopted Maternal Grandfather      Stomach Cancer Maternal Grandfather      Unknown/Adopted Paternal Grandmother      Unknown/Adopted Paternal Grandfather      Melanoma Maternal Uncle      History   Drug Use No         Objective     /81 (BP Location: Right arm, Patient Position: Sitting, Cuff Size: Adult Regular)   Pulse 55   Temp 97.6  F (36.4  C) (Oral)   Wt 92.4 kg (203 lb 9.6 oz)   SpO2 97%   BMI 30.07 kg/m      Physical Exam    GENERAL APPEARANCE: healthy, alert and no distress     EYES: Eyes grossly normal to inspection     HENT: ear canals and TM's normal and nose and mouth without ulcers or lesions     NECK: no adenopathy, no asymmetry, masses, or scars and  thyroid normal to palpation     RESP: lungs clear to auscultation - no rales, rhonchi or wheezes     CV: regular rates and rhythm, normal S1 S2, no S3 or S4 and no murmur, click or rub     ABDOMEN:  soft, nontender, no HSM or masses and bowel sounds normal     NEURO: Normal strength and tone, sensory exam grossly normal, mentation intact and speech normal     PSYCH: mentation appears normal. and affect normal/bright     LYMPHATICS: No cervical adenopathy    Recent Labs   Lab Test 06/24/22  1102 06/17/22  0848 06/16/22  0424 04/02/22  1115 04/02/22  1114 01/20/22  0609 01/19/22 2006   HGB  --  14.9 13.5   < >  --    < > 15.9   PLT  --  291 267   < >  --    < > 381   INR  --   --   --   --  1.03  --   --    NA  --  141 139   < > 133   < > 137   POTASSIUM  --  3.4 3.0*   < > 3.7   < > 4.0   CR  --  0.56* 0.56*   < > 0.54*   < > 0.56*   A1C 12.3*  --   --   --   --   --  10.1*    < > = values in this interval not displayed.      Last Comprehensive Metabolic Panel:  Sodium   Date Value Ref Range Status   06/27/2022 137 133 - 144 mmol/L Final   05/12/2020 135 133 - 144 mmol/L Final     Potassium   Date Value Ref Range Status   06/27/2022 4.2 3.4 - 5.3 mmol/L Final   05/12/2020 3.9 3.4 - 5.3 mmol/L Final     Chloride   Date Value Ref Range Status   06/27/2022 101 94 - 109 mmol/L Final   05/12/2020 98 94 - 109 mmol/L Final     Carbon Dioxide   Date Value Ref Range Status   05/12/2020 28 20 - 32 mmol/L Final     Carbon Dioxide (CO2)   Date Value Ref Range Status   06/27/2022 29 20 - 32 mmol/L Final     Anion Gap   Date Value Ref Range Status   06/27/2022 7 3 - 14 mmol/L Final   05/12/2020 9 3 - 14 mmol/L Final     Glucose   Date Value Ref Range Status   06/27/2022 451 (HH) 70 - 99 mg/dL Final   05/12/2020 490 (H) 70 - 99 mg/dL Final     Comment:     Results confirmed by repeat test  Critical Value called to and read back by  REESE GRANADO ON 05.12.20 BY KM 1340  Fasting specimen  CARSON LEDESMA 1347 5/12/20WD       Urea Nitrogen    Date Value Ref Range Status   06/27/2022 14 7 - 30 mg/dL Final   05/12/2020 20 7 - 30 mg/dL Final     Creatinine   Date Value Ref Range Status   06/27/2022 0.58 (L) 0.66 - 1.25 mg/dL Final   05/12/2020 0.66 0.66 - 1.25 mg/dL Final     GFR Estimate   Date Value Ref Range Status   06/27/2022 >90 >60 mL/min/1.73m2 Final     Comment:     Effective December 21, 2021 eGFRcr in adults is calculated using the 2021 CKD-EPI creatinine equation which includes age and gender (Minh et al., NEJM, DOI: 10.1056/ITXHnn4959944)   05/12/2020 >90 >60 mL/min/[1.73_m2] Final     Comment:     Non  GFR Calc  Starting 12/18/2018, serum creatinine based estimated GFR (eGFR) will be   calculated using the Chronic Kidney Disease Epidemiology Collaboration   (CKD-EPI) equation.       Calcium   Date Value Ref Range Status   06/27/2022 8.6 8.5 - 10.1 mg/dL Final   05/12/2020 9.8 8.5 - 10.1 mg/dL Final     Bilirubin Total   Date Value Ref Range Status   06/14/2022 0.8 0.2 - 1.3 mg/dL Final   08/10/2018 0.7 0.2 - 1.3 mg/dL Final     Alkaline Phosphatase   Date Value Ref Range Status   06/14/2022 134 40 - 150 U/L Final   08/10/2018 79 40 - 150 U/L Final     ALT   Date Value Ref Range Status   06/14/2022 20 0 - 70 U/L Final   08/10/2018 28 0 - 70 U/L Final     AST   Date Value Ref Range Status   06/14/2022 13 0 - 45 U/L Final   08/10/2018 19 0 - 45 U/L Final             Lab Results   Component Value Date    A1C 12.3 06/24/2022    A1C 10.1 01/19/2022    A1C 11.9 05/12/2020    A1C 6.3 08/10/2018    A1C 6.5 11/27/2017    A1C 6.4 06/29/2017    A1C 6.5 02/13/2017     Lab Results   Component Value Date    WBC 17.0 06/27/2022    WBC 9.3 04/27/2017     Lab Results   Component Value Date    RBC 4.79 06/27/2022    RBC 4.88 04/27/2017     Lab Results   Component Value Date    HGB 15.0 06/27/2022    HGB 15.2 04/27/2017     Lab Results   Component Value Date    HCT 44.9 06/27/2022    HCT 43.8 04/27/2017     No components found for: MCT  Lab  Results   Component Value Date    MCV 94 06/27/2022    MCV 90 04/27/2017     Lab Results   Component Value Date    MCH 31.3 06/27/2022    MCH 31.1 04/27/2017     Lab Results   Component Value Date    MCHC 33.4 06/27/2022    MCHC 34.7 04/27/2017     Lab Results   Component Value Date    RDW 13.4 06/27/2022    RDW 12.9 04/27/2017     Lab Results   Component Value Date     06/27/2022     04/27/2017         Diagnostics:  Labs pending at this time.  Results will be reviewed when available.   Recent echo and ekg.    Revised Cardiac Risk Index (RCRI):  The patient has the following serious cardiovascular risks for perioperative complications:   - Diabetes Mellitus (on Insulin) = 1 point     RCRI Interpretation: 1 point: Class II (low risk - 0.9% complication rate)           Signed Electronically by: JERRY Alicia Ra CNP  Copy of this evaluation report is provided to requesting physician.

## 2022-06-27 NOTE — H&P (VIEW-ONLY)
[facility]  [address] [facility] [address]  Phone: [phone]  Primary Provider: Cyrus Morrison Ra  Pre-op Performing Provider: CYRUS MORRISON RA      PREOPERATIVE EVALUATION:  Today's date: 6/27/2022    Connor Emerson is a 42 year old male who presents for a preoperative evaluation.    Surgical Information:  Surgery/Procedure: Left stealth craniotomy for tumor resection with motor mapping  Surgery Location: [facility]  Surgeon: Stephen Moran MD  Surgery Date: 6/28/2022  Time of Surgery: 12:30 PM  Where patient plans to recover: At home with family  Fax number for surgical facility: Note does not need to be faxed, will be available electronically in Epic.    Type of Anesthesia Anticipated: General    Assessment & Plan     The proposed surgical procedure is considered INTERMEDIATE risk.    Brain metastasis (H)  Surgery planned.  - Asymptomatic COVID-19 Virus (Coronavirus) by PCR Nose    Vasogenic cerebral edema (H)  Steroids currently.  - Asymptomatic COVID-19 Virus (Coronavirus) by PCR Nose    Type 2 diabetes mellitus with hyperglycemia, with long-term current use of insulin (H)  Poor control, improving.  Visit with MTM later today to adjust treatment plan.  - insulin glargine (LANTUS PEN) 100 UNIT/ML pen; Inject 20 Units Subcutaneous 2 times daily  - Basic metabolic panel  (Ca, Cl, CO2, Creat, Gluc, K, Na, BUN); Future  - CBC with platelets; Future  - Basic metabolic panel  (Ca, Cl, CO2, Creat, Gluc, K, Na, BUN)  - CBC with platelets    Malignant neoplasm of lower lobe of right lung (H)  Following with oncology.           Risks and Recommendations:  The patient has the following additional risks and recommendations for perioperative complications:   - Consult Hospitalist / IM to assist with post-op medical management   - Recurrent use of steroids  Diabetes:  Meeting with MTM to discuss adjustment of treatment plan as well as preprocedure  management.        RECOMMENDATION:  APPROVAL GIVEN to proceed with proposed procedure, without further diagnostic evaluation as procedure is necessary now despite poorly controlled blood sugars.  Unfortunately, the patient did not come to his appointment with the medication management pharmacist this afternoon.  Anticipate delay healing due to elevated blood sugars.  Will need additional care following surgery to regulate blood sugars as his treatment plan needs to be updated, and we were unable to do this for him today.  Also, we were unable to leave a voicemail for the patient as his mailbox was full.          Subjective     HPI related to upcoming procedure: history of radiosurgery to multiple brain metastasis from lung cancer.  Recent significant cerebral edema and larger L frontal lesion.  Treated with PO steroid.  Craniotomy for resection of lesion planned.      Preop Questions 6/27/2022   1. Have you ever had a heart attack or stroke? No   2. Have you ever had surgery on your heart or blood vessels, such as a stent placement, a coronary artery bypass, or surgery on an artery in your head, neck, heart, or legs? No   3. Do you have chest pain with activity? No   4. Do you have a history of  heart failure? No   5. Do you currently have a cold, bronchitis or symptoms of other infection? No   6. Do you have a cough, shortness of breath, or wheezing? No   7. Do you or anyone in your family have previous history of blood clots? No   8. Do you or does anyone in your family have a serious bleeding problem such as prolonged bleeding following surgeries or cuts? No   9. Have you ever had problems with anemia or been told to take iron pills? No   10. Have you had any abnormal blood loss such as black, tarry or bloody stools? No   11. Have you ever had a blood transfusion? No   12. Are you willing to have a blood transfusion if it is medically needed before, during, or after your surgery? NO -    13. Have you or any of  your relatives ever had problems with anesthesia? No   14. Do you have sleep apnea, excessive snoring or daytime drowsiness? No   15. Do you have any artifical heart valves or other implanted medical devices like a pacemaker, defibrillator, or continuous glucose monitor? No   16. Do you have artificial joints? No   17. Are you allergic to latex? No       Health Care Directive:  Patient does not have a Health Care Directive or Living Will:     Preoperative Review of :   reviewed - controlled substances reflected in medication list.      Status of Chronic Conditions:    Diabetes  Poor control, although improving.  Continued steroid use.  Compliant with BID long acting insulin.  Meeting with MTM this afternoon to discuss mealtime insulin.  Improving polyuria and polydipsia.  Weight has stabilized.       Fasting sugars have been coming down.  Consistently now around 250 when fasting.    Weight has stabilized.  Excessive thirst has decreased as well as urinary frequency.    No chest pain.  Breathing is stable.  Anxiety increased, will be starting new medication.      Review of Systems  Constitutional, neuro, ENT, endocrine, pulmonary, cardiac, gastrointestinal, genitourinary, musculoskeletal, integument and psychiatric systems are negative, except as otherwise noted.    Patient Active Problem List    Diagnosis Date Noted     Complication of chest tube, initial encounter 04/02/2022     Priority: Medium     Severe sepsis (H) 04/02/2022     Priority: Medium     Brain metastasis (H) 02/18/2022     Priority: Medium     Malignant neoplasm of lower lobe of right lung (H) 02/15/2022     Priority: Medium     Pleural effusion on right 01/19/2022     Priority: Medium     Morbid obesity (H) 11/27/2017     Priority: Medium     Cellulitis of hand, right 04/27/2017     Priority: Medium     Lateral epicondylitis of right elbow 11/11/2016     Priority: Medium     Adjustment disorder with mixed anxiety and depressed mood 04/23/2016      Priority: Medium     Type 2 diabetes mellitus without complication (H) 10/27/2015     Priority: Medium     Intractable chronic migraine without aura 01/08/2015     Priority: Medium     Problem list name updated by automated process. Provider to review       GERD (gastroesophageal reflux disease) 12/02/2013     Priority: Medium     Atypical chest pain 12/02/2013     Priority: Medium     Anxiety 07/09/2013     Priority: Medium     HTN, goal below 140/90 07/02/2013     Priority: Medium     Obesity 11/26/2012     Priority: Medium     Insomnia 02/21/2012     Priority: Medium     Low back pain 02/04/2012     Priority: Medium     Diagnosis updated by automated process. Provider to review and confirm.       CARDIOVASCULAR SCREENING; LDL GOAL LESS THAN 160 10/28/2011     Priority: Medium      Past Medical History:   Diagnosis Date     Atypical chest pain 12/02/2013     Cancer (H)      Complication of anesthesia      Diabetes (H)      GERD (gastroesophageal reflux disease) 12/02/2013     HTN (hypertension) 05/14/2012     HTN, goal below 140/90 07/02/2013     Insomnia 02/21/2012     Mediastinal lymphadenopathy      Migraine headache 07/02/2013     Migraine with aura, without mention of intractable migraine without mention of status migrainosus      Pneumonia      Past Surgical History:   Procedure Laterality Date     BRONCHOSCOPY RIGID OR FLEXIBLE W/TRANSENDOSCOPIC ENDOBRONCHIAL ULTRASOUND GUIDED Bilateral 1/26/2022    Procedure: Right BRONCHOSCOPY, FIBEROPTIC, endobronchial ultrasound, pleural biopsy;  Surgeon: Dallin Agrawal MD;  Location: [facility] OR     INJECT BLOCK MEDIAL BRANCH CERVICAL/THORACIC/LUMBAR       INSERT CHEST TUBE Right 2/16/2022    Procedure: INSERTION, CATHETER, INTERCOSTAL, FOR DRAINAGE;  Surgeon: Dallin Agrawal MD;  Location: [facility] GI     INSERT CHEST TUBE Right 3/9/2022    Procedure: INSERTION, CATHETER, INTERCOSTAL, FOR DRAINAGE;  Surgeon: Sushila Antonio MD;  Location: [facility] GI     IR CHEST TUBE REMOVAL  TUNNELED RIGHT  4/2/2022     ORTHOPEDIC SURGERY      Ganesh. Rotator cuff repair.     PLEUROSCOPY N/A 1/26/2022    Procedure: Pleuroscopy with Pleural Biopsy;  Surgeon: Dallin Agrawal MD;  Location: [facility] OR     Current Outpatient Medications   Medication Sig Dispense Refill     alcohol swab prep pads Use to swab area of injection/jabier as directed. 100 each 3     alectinib (ALECENSA) 150 MG CAPS Take 3 capsules (450 mg) by mouth 2 times daily (with meals) 180 capsule 11     blood glucose (NO BRAND SPECIFIED) lancets standard Use to test blood sugar 1 times daily or as directed. 100 each 0     blood glucose (NO BRAND SPECIFIED) test strip Use to test blood sugar 2 times daily or as directed. To accompany: Blood Glucose Monitor Brands: per insurance. 100 strip 6     blood glucose (NO BRAND SPECIFIED) test strip Use to test blood sugar 1 times daily or as directed. 1 Box 0     blood glucose calibration (NO BRAND SPECIFIED) solution To accompany: Blood Glucose Monitor Brands: per insurance. 1 each 0     blood glucose monitoring (NO BRAND SPECIFIED) meter device kit Use to test blood sugar 2 times daily or as directed. Preferred blood glucose meter OR supplies to accompany: Blood Glucose Monitor Brands: per insurance. 1 kit 0     blood glucose monitoring (NO BRAND SPECIFIED) meter device kit Use to test blood sugar 1 times daily or as directed. 1 kit 0     citalopram (CELEXA) 20 MG tablet Take 1 tablet (20 mg) by mouth every evening 90 tablet 0     dexamethasone (DECADRON) 4 MG tablet Take 1 tablet (4 mg) by mouth daily (with breakfast) 30 tablet 3     famotidine (PEPCID) 40 MG tablet Take 40 mg by mouth daily       hydrochlorothiazide (HYDRODIURIL) 25 MG tablet Take 1 tablet (25 mg) by mouth in the morning. 30 tablet 3     insulin glargine (LANTUS PEN) 100 UNIT/ML pen Inject 20 Units Subcutaneous At Bedtime 15 mL 0     insulin pen needle (31G X 8 MM) 31G X 8 MM miscellaneous Use one pen needles daily or as directed. 100  each 0     levETIRAcetam (KEPPRA) 1000 MG tablet Take 1 tablet (1,000 mg) by mouth 2 times daily 60 tablet 1     metoclopramide (REGLAN) 5 MG tablet Take 1 tablet (5 mg) by mouth 3 times daily as needed (hiccups) 30 tablet 1     oxyCODONE (ROXICODONE) 5 MG tablet Take 1 tablet (5 mg) by mouth every 6 hours as needed for pain 30 tablet 0     prochlorperazine (COMPAZINE) 10 MG tablet Take 1 tablet (10 mg) by mouth every 6 hours as needed (Nausea/Vomiting) 30 tablet 2     thin (NO BRAND SPECIFIED) lancets Use with lanceting device. To accompany: Blood Glucose Monitor Brands: per insurance. 100 each 6     nystatin (MYCOSTATIN) 572670 UNIT/ML suspension Take 2 mLs (200,000 Units) by mouth 4 times daily (Patient not taking: No sig reported) 60 mL 1       Allergies   Allergen Reactions     Vicodin [Hydrocodone-Acetaminophen] Nausea and Vomiting and GI Disturbance        Social History     Tobacco Use     Smoking status: Never Smoker     Smokeless tobacco: Never Used   Substance Use Topics     Alcohol use: Yes     Alcohol/week: 0.0 standard drinks     Comment: social     Family History   Problem Relation Age of Onset     Unknown/Adopted Mother      Uterine Cancer Mother      Unknown/Adopted Father      Unknown/Adopted Maternal Grandmother      Unknown/Adopted Maternal Grandfather      Stomach Cancer Maternal Grandfather      Unknown/Adopted Paternal Grandmother      Unknown/Adopted Paternal Grandfather      Melanoma Maternal Uncle      History   Drug Use No         Objective     /81 (BP Location: Right arm, Patient Position: Sitting, Cuff Size: Adult Regular)   Pulse 55   Temp 97.6  F (36.4  C) (Oral)   Wt 92.4 kg (203 lb 9.6 oz)   SpO2 97%   BMI 30.07 kg/m      Physical Exam    GENERAL APPEARANCE: healthy, alert and no distress     EYES: Eyes grossly normal to inspection     HENT: ear canals and TM's normal and nose and mouth without ulcers or lesions     NECK: no adenopathy, no asymmetry, masses, or scars and  thyroid normal to palpation     RESP: lungs clear to auscultation - no rales, rhonchi or wheezes     CV: regular rates and rhythm, normal S1 S2, no S3 or S4 and no murmur, click or rub     ABDOMEN:  soft, nontender, no HSM or masses and bowel sounds normal     NEURO: Normal strength and tone, sensory exam grossly normal, mentation intact and speech normal     PSYCH: mentation appears normal. and affect normal/bright     LYMPHATICS: No cervical adenopathy    Recent Labs   Lab Test 06/24/22  1102 06/17/22  0848 06/16/22  0424 04/02/22  1115 04/02/22  1114 01/20/22  0609 01/19/22 2006   HGB  --  14.9 13.5   < >  --    < > 15.9   PLT  --  291 267   < >  --    < > 381   INR  --   --   --   --  1.03  --   --    NA  --  141 139   < > 133   < > 137   POTASSIUM  --  3.4 3.0*   < > 3.7   < > 4.0   CR  --  0.56* 0.56*   < > 0.54*   < > 0.56*   A1C 12.3*  --   --   --   --   --  10.1*    < > = values in this interval not displayed.      Last Comprehensive Metabolic Panel:  Sodium   Date Value Ref Range Status   06/27/2022 137 133 - 144 mmol/L Final   05/12/2020 135 133 - 144 mmol/L Final     Potassium   Date Value Ref Range Status   06/27/2022 4.2 3.4 - 5.3 mmol/L Final   05/12/2020 3.9 3.4 - 5.3 mmol/L Final     Chloride   Date Value Ref Range Status   06/27/2022 101 94 - 109 mmol/L Final   05/12/2020 98 94 - 109 mmol/L Final     Carbon Dioxide   Date Value Ref Range Status   05/12/2020 28 20 - 32 mmol/L Final     Carbon Dioxide (CO2)   Date Value Ref Range Status   06/27/2022 29 20 - 32 mmol/L Final     Anion Gap   Date Value Ref Range Status   06/27/2022 7 3 - 14 mmol/L Final   05/12/2020 9 3 - 14 mmol/L Final     Glucose   Date Value Ref Range Status   06/27/2022 451 (HH) 70 - 99 mg/dL Final   05/12/2020 490 (H) 70 - 99 mg/dL Final     Comment:     Results confirmed by repeat test  Critical Value called to and read back by  REESE GRANADO ON 05.12.20 BY KM 1340  Fasting specimen  CARSON LEDESMA 1347 5/12/20WD       Urea Nitrogen    Date Value Ref Range Status   06/27/2022 14 7 - 30 mg/dL Final   05/12/2020 20 7 - 30 mg/dL Final     Creatinine   Date Value Ref Range Status   06/27/2022 0.58 (L) 0.66 - 1.25 mg/dL Final   05/12/2020 0.66 0.66 - 1.25 mg/dL Final     GFR Estimate   Date Value Ref Range Status   06/27/2022 >90 >60 mL/min/1.73m2 Final     Comment:     Effective December 21, 2021 eGFRcr in adults is calculated using the 2021 CKD-EPI creatinine equation which includes age and gender (Minh et al., NEJM, DOI: 10.1056/MHYUee8144703)   05/12/2020 >90 >60 mL/min/[1.73_m2] Final     Comment:     Non  GFR Calc  Starting 12/18/2018, serum creatinine based estimated GFR (eGFR) will be   calculated using the Chronic Kidney Disease Epidemiology Collaboration   (CKD-EPI) equation.       Calcium   Date Value Ref Range Status   06/27/2022 8.6 8.5 - 10.1 mg/dL Final   05/12/2020 9.8 8.5 - 10.1 mg/dL Final     Bilirubin Total   Date Value Ref Range Status   06/14/2022 0.8 0.2 - 1.3 mg/dL Final   08/10/2018 0.7 0.2 - 1.3 mg/dL Final     Alkaline Phosphatase   Date Value Ref Range Status   06/14/2022 134 40 - 150 U/L Final   08/10/2018 79 40 - 150 U/L Final     ALT   Date Value Ref Range Status   06/14/2022 20 0 - 70 U/L Final   08/10/2018 28 0 - 70 U/L Final     AST   Date Value Ref Range Status   06/14/2022 13 0 - 45 U/L Final   08/10/2018 19 0 - 45 U/L Final             Lab Results   Component Value Date    A1C 12.3 06/24/2022    A1C 10.1 01/19/2022    A1C 11.9 05/12/2020    A1C 6.3 08/10/2018    A1C 6.5 11/27/2017    A1C 6.4 06/29/2017    A1C 6.5 02/13/2017     Lab Results   Component Value Date    WBC 17.0 06/27/2022    WBC 9.3 04/27/2017     Lab Results   Component Value Date    RBC 4.79 06/27/2022    RBC 4.88 04/27/2017     Lab Results   Component Value Date    HGB 15.0 06/27/2022    HGB 15.2 04/27/2017     Lab Results   Component Value Date    HCT 44.9 06/27/2022    HCT 43.8 04/27/2017     No components found for: MCT  Lab  Results   Component Value Date    MCV 94 06/27/2022    MCV 90 04/27/2017     Lab Results   Component Value Date    MCH 31.3 06/27/2022    MCH 31.1 04/27/2017     Lab Results   Component Value Date    MCHC 33.4 06/27/2022    MCHC 34.7 04/27/2017     Lab Results   Component Value Date    RDW 13.4 06/27/2022    RDW 12.9 04/27/2017     Lab Results   Component Value Date     06/27/2022     04/27/2017         Diagnostics:  Labs pending at this time.  Results will be reviewed when available.   Recent echo and ekg.    Revised Cardiac Risk Index (RCRI):  The patient has the following serious cardiovascular risks for perioperative complications:   - Diabetes Mellitus (on Insulin) = 1 point     RCRI Interpretation: 1 point: Class II (low risk - 0.9% complication rate)           Signed Electronically by: JERRY Alicia Ra CNP  Copy of this evaluation report is provided to requesting physician.

## 2022-06-28 ENCOUNTER — ANESTHESIA EVENT (OUTPATIENT)
Dept: SURGERY | Facility: CLINIC | Age: 42
End: 2022-06-28
Payer: COMMERCIAL

## 2022-06-28 ENCOUNTER — TELEPHONE (OUTPATIENT)
Dept: FAMILY MEDICINE | Facility: CLINIC | Age: 42
End: 2022-06-28

## 2022-06-28 ENCOUNTER — ANESTHESIA (OUTPATIENT)
Dept: SURGERY | Facility: CLINIC | Age: 42
End: 2022-06-28
Payer: COMMERCIAL

## 2022-06-28 ENCOUNTER — HOSPITAL ENCOUNTER (INPATIENT)
Facility: CLINIC | Age: 42
LOS: 2 days | Discharge: HOME | End: 2022-06-30
Attending: NEUROLOGICAL SURGERY | Admitting: NEUROLOGICAL SURGERY
Payer: COMMERCIAL

## 2022-06-28 DIAGNOSIS — C79.31 BRAIN METASTASIS: Primary | ICD-10-CM

## 2022-06-28 DIAGNOSIS — Z41.9 SURGERY, ELECTIVE: ICD-10-CM

## 2022-06-28 DIAGNOSIS — Z79.4 TYPE 2 DIABETES MELLITUS WITH HYPERGLYCEMIA, WITH LONG-TERM CURRENT USE OF INSULIN (H): ICD-10-CM

## 2022-06-28 DIAGNOSIS — E11.65 TYPE 2 DIABETES MELLITUS WITH HYPERGLYCEMIA, WITH LONG-TERM CURRENT USE OF INSULIN (H): ICD-10-CM

## 2022-06-28 LAB
ABO/RH(D): NORMAL
ANTIBODY SCREEN: NEGATIVE
APTT PPP: 21 SECONDS (ref 22–38)
BASOPHILS # BLD AUTO: 0 10E3/UL (ref 0–0.2)
BASOPHILS NFR BLD AUTO: 0 %
EOSINOPHIL # BLD AUTO: 0.3 10E3/UL (ref 0–0.7)
EOSINOPHIL NFR BLD AUTO: 2 %
ERYTHROCYTE [DISTWIDTH] IN BLOOD BY AUTOMATED COUNT: 13.1 % (ref 10–15)
GLUCOSE BLD-MCNC: 370 MG/DL (ref 70–99)
GLUCOSE BLDC GLUCOMTR-MCNC: 202 MG/DL (ref 70–99)
GLUCOSE BLDC GLUCOMTR-MCNC: 235 MG/DL (ref 70–99)
GLUCOSE BLDC GLUCOMTR-MCNC: 236 MG/DL (ref 70–99)
GLUCOSE BLDC GLUCOMTR-MCNC: 291 MG/DL (ref 70–99)
GLUCOSE BLDC GLUCOMTR-MCNC: 357 MG/DL (ref 70–99)
GLUCOSE BLDC GLUCOMTR-MCNC: 378 MG/DL (ref 70–99)
HCT VFR BLD AUTO: 44.9 % (ref 40–53)
HGB BLD-MCNC: 15.4 G/DL (ref 13.3–17.7)
IMM GRANULOCYTES # BLD: 0.1 10E3/UL
IMM GRANULOCYTES NFR BLD: 1 %
INR PPP: 0.9 (ref 0.85–1.15)
LYMPHOCYTES # BLD AUTO: 4.3 10E3/UL (ref 0.8–5.3)
LYMPHOCYTES NFR BLD AUTO: 29 %
MCH RBC QN AUTO: 31.4 PG (ref 26.5–33)
MCHC RBC AUTO-ENTMCNC: 34.3 G/DL (ref 31.5–36.5)
MCV RBC AUTO: 91 FL (ref 78–100)
MONOCYTES # BLD AUTO: 0.8 10E3/UL (ref 0–1.3)
MONOCYTES NFR BLD AUTO: 5 %
NEUTROPHILS # BLD AUTO: 9.5 10E3/UL (ref 1.6–8.3)
NEUTROPHILS NFR BLD AUTO: 63 %
NRBC # BLD AUTO: 0 10E3/UL
NRBC BLD AUTO-RTO: 0 /100
PLATELET # BLD AUTO: 231 10E3/UL (ref 150–450)
POTASSIUM BLD-SCNC: 3.8 MMOL/L (ref 3.4–5.3)
RBC # BLD AUTO: 4.91 10E6/UL (ref 4.4–5.9)
SPECIMEN EXPIRATION DATE: NORMAL
WBC # BLD AUTO: 15.1 10E3/UL (ref 4–11)

## 2022-06-28 PROCEDURE — 36415 COLL VENOUS BLD VENIPUNCTURE: CPT | Performed by: PHYSICIAN ASSISTANT

## 2022-06-28 PROCEDURE — 250N000011 HC RX IP 250 OP 636: Performed by: SURGERY

## 2022-06-28 PROCEDURE — 84132 ASSAY OF SERUM POTASSIUM: CPT | Performed by: SURGERY

## 2022-06-28 PROCEDURE — 86850 RBC ANTIBODY SCREEN: CPT | Performed by: PHYSICIAN ASSISTANT

## 2022-06-28 PROCEDURE — 200N000001 HC R&B ICU

## 2022-06-28 PROCEDURE — 272N000282 HC OR IOM SUPPLIES OPNP: Performed by: NEUROLOGICAL SURGERY

## 2022-06-28 PROCEDURE — 999N000141 HC STATISTIC PRE-PROCEDURE NURSING ASSESSMENT: Performed by: NEUROLOGICAL SURGERY

## 2022-06-28 PROCEDURE — 250N000012 HC RX MED GY IP 250 OP 636 PS 637: Performed by: PHYSICIAN ASSISTANT

## 2022-06-28 PROCEDURE — 250N000009 HC RX 250: Performed by: SURGERY

## 2022-06-28 PROCEDURE — 250N000012 HC RX MED GY IP 250 OP 636 PS 637: Performed by: SURGERY

## 2022-06-28 PROCEDURE — 250N000009 HC RX 250: Performed by: NEUROLOGICAL SURGERY

## 2022-06-28 PROCEDURE — 85730 THROMBOPLASTIN TIME PARTIAL: CPT | Performed by: PHYSICIAN ASSISTANT

## 2022-06-28 PROCEDURE — 250N000011 HC RX IP 250 OP 636: Performed by: PHYSICIAN ASSISTANT

## 2022-06-28 PROCEDURE — 85610 PROTHROMBIN TIME: CPT | Performed by: PHYSICIAN ASSISTANT

## 2022-06-28 PROCEDURE — 61510 CRNEC TREPH EXC BRN TUM STTL: CPT | Mod: AS | Performed by: PHYSICIAN ASSISTANT

## 2022-06-28 PROCEDURE — C1713 ANCHOR/SCREW BN/BN,TIS/BN: HCPCS | Performed by: NEUROLOGICAL SURGERY

## 2022-06-28 PROCEDURE — 85004 AUTOMATED DIFF WBC COUNT: CPT | Performed by: PHYSICIAN ASSISTANT

## 2022-06-28 PROCEDURE — 272N000001 HC OR GENERAL SUPPLY STERILE: Performed by: NEUROLOGICAL SURGERY

## 2022-06-28 PROCEDURE — 922N000001 HC NEURO MONITORING SERVICE, UP TO 7 HOURS (T1FEE): Performed by: NEUROLOGICAL SURGERY

## 2022-06-28 PROCEDURE — 258N000003 HC RX IP 258 OP 636: Performed by: ANESTHESIOLOGY

## 2022-06-28 PROCEDURE — 258N000003 HC RX IP 258 OP 636: Performed by: SURGERY

## 2022-06-28 PROCEDURE — 00B00ZZ EXCISION OF BRAIN, OPEN APPROACH: ICD-10-PCS | Performed by: NEUROLOGICAL SURGERY

## 2022-06-28 PROCEDURE — 8E09XBF COMPUTER ASSISTED PROCEDURE OF HEAD AND NECK REGION, WITH FLUOROSCOPY: ICD-10-PCS | Performed by: NEUROLOGICAL SURGERY

## 2022-06-28 PROCEDURE — 4A11X4G MONITORING OF PERIPHERAL NERVOUS ELECTRICAL ACTIVITY, INTRAOPERATIVE, EXTERNAL APPROACH: ICD-10-PCS | Performed by: NEUROLOGICAL SURGERY

## 2022-06-28 PROCEDURE — 250N000013 HC RX MED GY IP 250 OP 250 PS 637: Performed by: PHYSICIAN ASSISTANT

## 2022-06-28 PROCEDURE — 99232 SBSQ HOSP IP/OBS MODERATE 35: CPT | Performed by: PHYSICIAN ASSISTANT

## 2022-06-28 PROCEDURE — 88307 TISSUE EXAM BY PATHOLOGIST: CPT | Mod: TC | Performed by: NEUROLOGICAL SURGERY

## 2022-06-28 PROCEDURE — 360N000079 HC SURGERY LEVEL 6, PER MIN: Performed by: NEUROLOGICAL SURGERY

## 2022-06-28 PROCEDURE — 250N000011 HC RX IP 250 OP 636: Performed by: ANESTHESIOLOGY

## 2022-06-28 PROCEDURE — 250N000012 HC RX MED GY IP 250 OP 636 PS 637: Performed by: ANESTHESIOLOGY

## 2022-06-28 PROCEDURE — 250N000011 HC RX IP 250 OP 636: Performed by: NEUROLOGICAL SURGERY

## 2022-06-28 PROCEDURE — 61510 CRNEC TREPH EXC BRN TUM STTL: CPT | Performed by: NEUROLOGICAL SURGERY

## 2022-06-28 PROCEDURE — 370N000017 HC ANESTHESIA TECHNICAL FEE, PER MIN: Performed by: NEUROLOGICAL SURGERY

## 2022-06-28 PROCEDURE — 82947 ASSAY GLUCOSE BLOOD QUANT: CPT | Performed by: SURGERY

## 2022-06-28 PROCEDURE — 710N000010 HC RECOVERY PHASE 1, LEVEL 2, PER MIN: Performed by: NEUROLOGICAL SURGERY

## 2022-06-28 PROCEDURE — 272N000004 HC RX 272: Performed by: NEUROLOGICAL SURGERY

## 2022-06-28 PROCEDURE — 250N000025 HC SEVOFLURANE, PER MIN: Performed by: NEUROLOGICAL SURGERY

## 2022-06-28 PROCEDURE — 61781 SCAN PROC CRANIAL INTRA: CPT | Performed by: NEUROLOGICAL SURGERY

## 2022-06-28 DEVICE — IMP PLATE SYN BURR HOLE COVER 17MM 04.503.023: Type: IMPLANTABLE DEVICE | Site: CRANIAL | Status: FUNCTIONAL

## 2022-06-28 DEVICE — IMP PLATE SYN MATRIXNEURO STR 2 HOLE 12MM  04.503.062: Type: IMPLANTABLE DEVICE | Site: CRANIAL | Status: FUNCTIONAL

## 2022-06-28 DEVICE — IMP SCR SYN MATRIX LOW PRO 1.5X04MM SELF DRILL 04.503.104.01: Type: IMPLANTABLE DEVICE | Site: CRANIAL | Status: FUNCTIONAL

## 2022-06-28 RX ORDER — CITALOPRAM HYDROBROMIDE 20 MG/1
20 TABLET ORAL EVERY EVENING
Status: DISCONTINUED | OUTPATIENT
Start: 2022-06-28 | End: 2022-06-28

## 2022-06-28 RX ORDER — HYDROMORPHONE HCL IN WATER/PF 6 MG/30 ML
0.2 PATIENT CONTROLLED ANALGESIA SYRINGE INTRAVENOUS EVERY 5 MIN PRN
Status: DISCONTINUED | OUTPATIENT
Start: 2022-06-28 | End: 2022-06-28 | Stop reason: HOSPADM

## 2022-06-28 RX ORDER — ONDANSETRON 2 MG/ML
4 INJECTION INTRAMUSCULAR; INTRAVENOUS EVERY 6 HOURS PRN
Status: DISCONTINUED | OUTPATIENT
Start: 2022-06-28 | End: 2022-06-30 | Stop reason: HOSPADM

## 2022-06-28 RX ORDER — NALOXONE HYDROCHLORIDE 0.4 MG/ML
0.2 INJECTION, SOLUTION INTRAMUSCULAR; INTRAVENOUS; SUBCUTANEOUS
Status: DISCONTINUED | OUTPATIENT
Start: 2022-06-28 | End: 2022-06-30 | Stop reason: HOSPADM

## 2022-06-28 RX ORDER — DEXAMETHASONE SODIUM PHOSPHATE 4 MG/ML
4 INJECTION, SOLUTION INTRA-ARTICULAR; INTRALESIONAL; INTRAMUSCULAR; INTRAVENOUS; SOFT TISSUE EVERY 24 HOURS
Status: DISCONTINUED | OUTPATIENT
Start: 2022-07-02 | End: 2022-06-29

## 2022-06-28 RX ORDER — LIDOCAINE HYDROCHLORIDE 20 MG/ML
INJECTION, SOLUTION INFILTRATION; PERINEURAL PRN
Status: DISCONTINUED | OUTPATIENT
Start: 2022-06-28 | End: 2022-06-28

## 2022-06-28 RX ORDER — HYDRALAZINE HYDROCHLORIDE 20 MG/ML
10-20 INJECTION INTRAMUSCULAR; INTRAVENOUS EVERY 30 MIN PRN
Status: DISCONTINUED | OUTPATIENT
Start: 2022-06-28 | End: 2022-06-30 | Stop reason: HOSPADM

## 2022-06-28 RX ORDER — PROPOFOL 10 MG/ML
INJECTION, EMULSION INTRAVENOUS CONTINUOUS PRN
Status: DISCONTINUED | OUTPATIENT
Start: 2022-06-28 | End: 2022-06-28

## 2022-06-28 RX ORDER — POLYETHYLENE GLYCOL 3350 17 G/17G
17 POWDER, FOR SOLUTION ORAL DAILY
Status: DISCONTINUED | OUTPATIENT
Start: 2022-06-29 | End: 2022-06-30 | Stop reason: HOSPADM

## 2022-06-28 RX ORDER — MAGNESIUM HYDROXIDE 1200 MG/15ML
LIQUID ORAL PRN
Status: DISCONTINUED | OUTPATIENT
Start: 2022-06-28 | End: 2022-06-28 | Stop reason: HOSPADM

## 2022-06-28 RX ORDER — FENTANYL CITRATE 50 UG/ML
INJECTION, SOLUTION INTRAMUSCULAR; INTRAVENOUS PRN
Status: DISCONTINUED | OUTPATIENT
Start: 2022-06-28 | End: 2022-06-28

## 2022-06-28 RX ORDER — ONDANSETRON 4 MG/1
4 TABLET, ORALLY DISINTEGRATING ORAL EVERY 30 MIN PRN
Status: DISCONTINUED | OUTPATIENT
Start: 2022-06-28 | End: 2022-06-28 | Stop reason: HOSPADM

## 2022-06-28 RX ORDER — ONDANSETRON 4 MG/1
4 TABLET, ORALLY DISINTEGRATING ORAL EVERY 6 HOURS PRN
Status: DISCONTINUED | OUTPATIENT
Start: 2022-06-28 | End: 2022-06-30 | Stop reason: HOSPADM

## 2022-06-28 RX ORDER — DEXAMETHASONE SODIUM PHOSPHATE 4 MG/ML
2 INJECTION, SOLUTION INTRA-ARTICULAR; INTRALESIONAL; INTRAMUSCULAR; INTRAVENOUS; SOFT TISSUE EVERY 24 HOURS
Status: DISCONTINUED | OUTPATIENT
Start: 2022-07-06 | End: 2022-06-29

## 2022-06-28 RX ORDER — CALCIUM CARBONATE 500 MG/1
1000 TABLET, CHEWABLE ORAL 4 TIMES DAILY PRN
Status: DISCONTINUED | OUTPATIENT
Start: 2022-06-28 | End: 2022-06-30 | Stop reason: HOSPADM

## 2022-06-28 RX ORDER — PROCHLORPERAZINE MALEATE 10 MG
10 TABLET ORAL EVERY 6 HOURS PRN
Status: DISCONTINUED | OUTPATIENT
Start: 2022-06-28 | End: 2022-06-30 | Stop reason: HOSPADM

## 2022-06-28 RX ORDER — ACETAMINOPHEN 325 MG/1
650 TABLET ORAL EVERY 4 HOURS PRN
Status: DISCONTINUED | OUTPATIENT
Start: 2022-07-01 | End: 2022-06-30 | Stop reason: HOSPADM

## 2022-06-28 RX ORDER — METHOCARBAMOL 500 MG/1
1000 TABLET, FILM COATED ORAL EVERY 6 HOURS PRN
Status: DISCONTINUED | OUTPATIENT
Start: 2022-06-28 | End: 2022-06-30 | Stop reason: HOSPADM

## 2022-06-28 RX ORDER — OXYCODONE HYDROCHLORIDE 5 MG/1
5 TABLET ORAL EVERY 4 HOURS PRN
Status: DISCONTINUED | OUTPATIENT
Start: 2022-06-28 | End: 2022-06-28 | Stop reason: HOSPADM

## 2022-06-28 RX ORDER — SODIUM CHLORIDE, SODIUM LACTATE, POTASSIUM CHLORIDE, CALCIUM CHLORIDE 600; 310; 30; 20 MG/100ML; MG/100ML; MG/100ML; MG/100ML
INJECTION, SOLUTION INTRAVENOUS CONTINUOUS
Status: DISCONTINUED | OUTPATIENT
Start: 2022-06-28 | End: 2022-06-28 | Stop reason: HOSPADM

## 2022-06-28 RX ORDER — LABETALOL HYDROCHLORIDE 5 MG/ML
10-40 INJECTION, SOLUTION INTRAVENOUS EVERY 10 MIN PRN
Status: DISCONTINUED | OUTPATIENT
Start: 2022-06-28 | End: 2022-06-30 | Stop reason: HOSPADM

## 2022-06-28 RX ORDER — LIDOCAINE 40 MG/G
CREAM TOPICAL
Status: DISCONTINUED | OUTPATIENT
Start: 2022-06-28 | End: 2022-06-28 | Stop reason: HOSPADM

## 2022-06-28 RX ORDER — LABETALOL HYDROCHLORIDE 5 MG/ML
10 INJECTION, SOLUTION INTRAVENOUS ONCE
Status: COMPLETED | OUTPATIENT
Start: 2022-06-28 | End: 2022-06-28

## 2022-06-28 RX ORDER — METOCLOPRAMIDE 5 MG/1
5 TABLET ORAL 3 TIMES DAILY PRN
Status: DISCONTINUED | OUTPATIENT
Start: 2022-06-28 | End: 2022-06-30 | Stop reason: HOSPADM

## 2022-06-28 RX ORDER — DEXAMETHASONE SODIUM PHOSPHATE 4 MG/ML
4 INJECTION, SOLUTION INTRA-ARTICULAR; INTRALESIONAL; INTRAMUSCULAR; INTRAVENOUS; SOFT TISSUE EVERY 6 HOURS
Status: DISCONTINUED | OUTPATIENT
Start: 2022-06-28 | End: 2022-06-29

## 2022-06-28 RX ORDER — DEXTROSE MONOHYDRATE 25 G/50ML
25-50 INJECTION, SOLUTION INTRAVENOUS
Status: DISCONTINUED | OUTPATIENT
Start: 2022-06-28 | End: 2022-06-30 | Stop reason: HOSPADM

## 2022-06-28 RX ORDER — DEXAMETHASONE SODIUM PHOSPHATE 4 MG/ML
1 INJECTION, SOLUTION INTRA-ARTICULAR; INTRALESIONAL; INTRAMUSCULAR; INTRAVENOUS; SOFT TISSUE EVERY 24 HOURS
Status: DISCONTINUED | OUTPATIENT
Start: 2022-07-10 | End: 2022-06-29

## 2022-06-28 RX ORDER — OXYCODONE HYDROCHLORIDE 5 MG/1
10 TABLET ORAL EVERY 4 HOURS PRN
Status: DISCONTINUED | OUTPATIENT
Start: 2022-06-28 | End: 2022-06-30 | Stop reason: HOSPADM

## 2022-06-28 RX ORDER — BUPIVACAINE HYDROCHLORIDE AND EPINEPHRINE 5; 5 MG/ML; UG/ML
INJECTION, SOLUTION PERINEURAL PRN
Status: DISCONTINUED | OUTPATIENT
Start: 2022-06-28 | End: 2022-06-28 | Stop reason: HOSPADM

## 2022-06-28 RX ORDER — CEFAZOLIN SODIUM/WATER 2 G/20 ML
2 SYRINGE (ML) INTRAVENOUS SEE ADMIN INSTRUCTIONS
Status: DISCONTINUED | OUTPATIENT
Start: 2022-06-28 | End: 2022-06-28 | Stop reason: HOSPADM

## 2022-06-28 RX ORDER — HYDROMORPHONE HCL IN WATER/PF 6 MG/30 ML
0.2 PATIENT CONTROLLED ANALGESIA SYRINGE INTRAVENOUS
Status: DISCONTINUED | OUTPATIENT
Start: 2022-06-28 | End: 2022-06-30 | Stop reason: HOSPADM

## 2022-06-28 RX ORDER — LIDOCAINE 40 MG/G
CREAM TOPICAL
Status: DISCONTINUED | OUTPATIENT
Start: 2022-06-28 | End: 2022-06-30 | Stop reason: HOSPADM

## 2022-06-28 RX ORDER — HYDROCHLOROTHIAZIDE 25 MG/1
25 TABLET ORAL DAILY
Status: DISCONTINUED | OUTPATIENT
Start: 2022-06-29 | End: 2022-06-30 | Stop reason: HOSPADM

## 2022-06-28 RX ORDER — HYDROMORPHONE HCL IN WATER/PF 6 MG/30 ML
0.4 PATIENT CONTROLLED ANALGESIA SYRINGE INTRAVENOUS
Status: DISCONTINUED | OUTPATIENT
Start: 2022-06-28 | End: 2022-06-30 | Stop reason: HOSPADM

## 2022-06-28 RX ORDER — DEXAMETHASONE SODIUM PHOSPHATE 4 MG/ML
4 INJECTION, SOLUTION INTRA-ARTICULAR; INTRALESIONAL; INTRAMUSCULAR; INTRAVENOUS; SOFT TISSUE EVERY 12 HOURS
Status: DISCONTINUED | OUTPATIENT
Start: 2022-06-30 | End: 2022-06-29

## 2022-06-28 RX ORDER — FENTANYL CITRATE 0.05 MG/ML
25 INJECTION, SOLUTION INTRAMUSCULAR; INTRAVENOUS EVERY 5 MIN PRN
Status: DISCONTINUED | OUTPATIENT
Start: 2022-06-28 | End: 2022-06-28 | Stop reason: HOSPADM

## 2022-06-28 RX ORDER — LEVETIRACETAM 500 MG/1
1000 TABLET ORAL 2 TIMES DAILY
Status: DISCONTINUED | OUTPATIENT
Start: 2022-06-28 | End: 2022-06-30 | Stop reason: HOSPADM

## 2022-06-28 RX ORDER — AMOXICILLIN 250 MG
1 CAPSULE ORAL 2 TIMES DAILY
Status: DISCONTINUED | OUTPATIENT
Start: 2022-06-28 | End: 2022-06-30 | Stop reason: HOSPADM

## 2022-06-28 RX ORDER — NALOXONE HYDROCHLORIDE 0.4 MG/ML
0.4 INJECTION, SOLUTION INTRAMUSCULAR; INTRAVENOUS; SUBCUTANEOUS
Status: DISCONTINUED | OUTPATIENT
Start: 2022-06-28 | End: 2022-06-30 | Stop reason: HOSPADM

## 2022-06-28 RX ORDER — ACETAMINOPHEN 325 MG/1
975 TABLET ORAL EVERY 8 HOURS
Status: DISCONTINUED | OUTPATIENT
Start: 2022-06-28 | End: 2022-06-30 | Stop reason: HOSPADM

## 2022-06-28 RX ORDER — BACITRACIN ZINC 500 [USP'U]/G
OINTMENT TOPICAL PRN
Status: DISCONTINUED | OUTPATIENT
Start: 2022-06-28 | End: 2022-06-28 | Stop reason: HOSPADM

## 2022-06-28 RX ORDER — BISACODYL 10 MG
10 SUPPOSITORY, RECTAL RECTAL DAILY PRN
Status: DISCONTINUED | OUTPATIENT
Start: 2022-06-28 | End: 2022-06-30 | Stop reason: HOSPADM

## 2022-06-28 RX ORDER — DEXAMETHASONE SODIUM PHOSPHATE 4 MG/ML
INJECTION, SOLUTION INTRA-ARTICULAR; INTRALESIONAL; INTRAMUSCULAR; INTRAVENOUS; SOFT TISSUE PRN
Status: DISCONTINUED | OUTPATIENT
Start: 2022-06-28 | End: 2022-06-28

## 2022-06-28 RX ORDER — PROPOFOL 10 MG/ML
INJECTION, EMULSION INTRAVENOUS PRN
Status: DISCONTINUED | OUTPATIENT
Start: 2022-06-28 | End: 2022-06-28

## 2022-06-28 RX ORDER — CEFAZOLIN SODIUM/WATER 2 G/20 ML
2 SYRINGE (ML) INTRAVENOUS
Status: DISCONTINUED | OUTPATIENT
Start: 2022-06-28 | End: 2022-06-28 | Stop reason: HOSPADM

## 2022-06-28 RX ORDER — PROCHLORPERAZINE MALEATE 10 MG
10 TABLET ORAL EVERY 6 HOURS PRN
Status: DISCONTINUED | OUTPATIENT
Start: 2022-06-28 | End: 2022-06-28

## 2022-06-28 RX ORDER — HYDROXYZINE HYDROCHLORIDE 25 MG/1
25 TABLET, FILM COATED ORAL EVERY 6 HOURS PRN
Status: DISCONTINUED | OUTPATIENT
Start: 2022-06-28 | End: 2022-06-30 | Stop reason: HOSPADM

## 2022-06-28 RX ORDER — NICOTINE POLACRILEX 4 MG
15-30 LOZENGE BUCCAL
Status: DISCONTINUED | OUTPATIENT
Start: 2022-06-28 | End: 2022-06-30 | Stop reason: HOSPADM

## 2022-06-28 RX ORDER — ONDANSETRON 2 MG/ML
4 INJECTION INTRAMUSCULAR; INTRAVENOUS EVERY 30 MIN PRN
Status: DISCONTINUED | OUTPATIENT
Start: 2022-06-28 | End: 2022-06-28 | Stop reason: HOSPADM

## 2022-06-28 RX ORDER — OXYCODONE HYDROCHLORIDE 5 MG/1
5 TABLET ORAL EVERY 4 HOURS PRN
Status: DISCONTINUED | OUTPATIENT
Start: 2022-06-28 | End: 2022-06-30 | Stop reason: HOSPADM

## 2022-06-28 RX ORDER — NYSTATIN 100000/ML
200000 SUSPENSION, ORAL (FINAL DOSE FORM) ORAL 4 TIMES DAILY
Status: DISCONTINUED | OUTPATIENT
Start: 2022-06-28 | End: 2022-06-30 | Stop reason: HOSPADM

## 2022-06-28 RX ORDER — SODIUM CHLORIDE 9 MG/ML
INJECTION, SOLUTION INTRAVENOUS CONTINUOUS PRN
Status: DISCONTINUED | OUTPATIENT
Start: 2022-06-28 | End: 2022-06-28

## 2022-06-28 RX ORDER — ONDANSETRON 2 MG/ML
INJECTION INTRAMUSCULAR; INTRAVENOUS PRN
Status: DISCONTINUED | OUTPATIENT
Start: 2022-06-28 | End: 2022-06-28

## 2022-06-28 RX ORDER — FAMOTIDINE 20 MG/1
40 TABLET, FILM COATED ORAL DAILY
Status: DISCONTINUED | OUTPATIENT
Start: 2022-06-28 | End: 2022-06-28

## 2022-06-28 RX ADMIN — LABETALOL HYDROCHLORIDE 10 MG: 5 INJECTION INTRAVENOUS at 15:24

## 2022-06-28 RX ADMIN — PROPOFOL 200 MCG/KG/MIN: 10 INJECTION, EMULSION INTRAVENOUS at 13:35

## 2022-06-28 RX ADMIN — DEXAMETHASONE SODIUM PHOSPHATE 4 MG: 4 INJECTION, SOLUTION INTRAMUSCULAR; INTRAVENOUS at 20:03

## 2022-06-28 RX ADMIN — LIDOCAINE HYDROCHLORIDE 80 MG: 20 INJECTION, SOLUTION INFILTRATION; PERINEURAL at 13:15

## 2022-06-28 RX ADMIN — SUCCINYLCHOLINE CHLORIDE 100 MG: 20 INJECTION, SOLUTION INTRAMUSCULAR; INTRAVENOUS; PARENTERAL at 13:15

## 2022-06-28 RX ADMIN — MIDAZOLAM 2 MG: 1 INJECTION INTRAMUSCULAR; INTRAVENOUS at 13:07

## 2022-06-28 RX ADMIN — SODIUM CHLORIDE: 9 INJECTION, SOLUTION INTRAVENOUS at 13:15

## 2022-06-28 RX ADMIN — MIDAZOLAM HYDROCHLORIDE 2 MG: 1 INJECTION, SOLUTION INTRAMUSCULAR; INTRAVENOUS at 12:15

## 2022-06-28 RX ADMIN — PROPOFOL 200 MG: 10 INJECTION, EMULSION INTRAVENOUS at 13:15

## 2022-06-28 RX ADMIN — PHENYLEPHRINE HYDROCHLORIDE 0.5 MCG/KG/MIN: 10 INJECTION INTRAVENOUS at 13:57

## 2022-06-28 RX ADMIN — SODIUM CHLORIDE 6 UNITS: 9 INJECTION, SOLUTION INTRAVENOUS at 15:09

## 2022-06-28 RX ADMIN — Medication 200000 UNITS: at 21:25

## 2022-06-28 RX ADMIN — HYDRALAZINE HYDROCHLORIDE 10 MG: 20 INJECTION INTRAMUSCULAR; INTRAVENOUS at 19:29

## 2022-06-28 RX ADMIN — SODIUM CHLORIDE 10 UNITS: 9 INJECTION, SOLUTION INTRAVENOUS at 12:45

## 2022-06-28 RX ADMIN — FAMOTIDINE 40 MG: 20 TABLET ORAL at 18:47

## 2022-06-28 RX ADMIN — FENTANYL CITRATE 25 MCG: 50 INJECTION, SOLUTION INTRAMUSCULAR; INTRAVENOUS at 15:44

## 2022-06-28 RX ADMIN — INSULIN ASPART 9 UNITS: 100 INJECTION, SOLUTION INTRAVENOUS; SUBCUTANEOUS at 19:04

## 2022-06-28 RX ADMIN — LEVETIRACETAM 1000 MG: 500 TABLET, FILM COATED ORAL at 20:01

## 2022-06-28 RX ADMIN — FENTANYL CITRATE 100 MCG: 50 INJECTION, SOLUTION INTRAMUSCULAR; INTRAVENOUS at 13:15

## 2022-06-28 RX ADMIN — HYDRALAZINE HYDROCHLORIDE 20 MG: 20 INJECTION INTRAMUSCULAR; INTRAVENOUS at 18:50

## 2022-06-28 RX ADMIN — REMIFENTANIL HYDROCHLORIDE 0.05 MCG/KG/MIN: 1 INJECTION, POWDER, LYOPHILIZED, FOR SOLUTION INTRAVENOUS at 13:42

## 2022-06-28 RX ADMIN — Medication 2 G: at 13:15

## 2022-06-28 RX ADMIN — ACETAMINOPHEN 975 MG: 325 TABLET ORAL at 18:48

## 2022-06-28 RX ADMIN — PROPOFOL 200 MG: 10 INJECTION, EMULSION INTRAVENOUS at 13:43

## 2022-06-28 RX ADMIN — SODIUM CHLORIDE, POTASSIUM CHLORIDE, SODIUM LACTATE AND CALCIUM CHLORIDE: 600; 310; 30; 20 INJECTION, SOLUTION INTRAVENOUS at 12:14

## 2022-06-28 RX ADMIN — ONDANSETRON 4 MG: 2 INJECTION INTRAMUSCULAR; INTRAVENOUS at 14:17

## 2022-06-28 RX ADMIN — DEXAMETHASONE SODIUM PHOSPHATE 10 MG: 4 INJECTION, SOLUTION INTRA-ARTICULAR; INTRALESIONAL; INTRAMUSCULAR; INTRAVENOUS; SOFT TISSUE at 13:45

## 2022-06-28 RX ADMIN — INSULIN GLARGINE 20 UNITS: 100 INJECTION, SOLUTION SUBCUTANEOUS at 20:27

## 2022-06-28 ASSESSMENT — ACTIVITIES OF DAILY LIVING (ADL)
ADLS_ACUITY_SCORE: 33
ADLS_ACUITY_SCORE: 22

## 2022-06-28 NOTE — INTERVAL H&P NOTE
"I have reviewed the surgical (or preoperative) H&P that is linked to this encounter, and examined the patient. There are no significant changes    Clinical Conditions Present on Arrival:  Clinically Significant Risk Factors Present on Admission                   # Overweight: Estimated body mass index is 29.83 kg/m  as calculated from the following:    Height as of this encounter: 1.753 m (5' 9\").    Weight as of this encounter: 91.6 kg (202 lb).       "

## 2022-06-28 NOTE — ANESTHESIA PROCEDURE NOTES
Arterial Line Procedure Note    Pre-Procedure   Staff -        Anesthesiologist:  Rudi Rivers DO       Performed By: anesthesiologist       Location: pre-op       Pre-Anesthestic Checklist: patient identified, IV checked, risks and benefits discussed, informed consent, monitors and equipment checked, pre-op evaluation and at physician/surgeon's request  Timeout:       Correct Patient: Yes        Correct Procedure: Yes        Correct Site: Yes        Correct Position: Yes   Line Placement:   This line was placed Pre Induction starting at 6/28/2022 12:22 PM and ending at 6/28/2022 12:26 PM  Procedure   Procedure: arterial line       Laterality: right       Insertion Site: radial.  Sterile Prep        All elements of maximal sterile barrier technique followed       Patient Prep/Sterile Barriers: hand hygiene, sterile gloves, hat, mask, draped, sterile gown, draped       Skin prep: Chloraprep  Insertion/Injection        Technique: Seldinger Technique and ultrasound guided (No image saved)        1. Ultrasound was used to evaluate the access site.       2. Artery evaluated via ultrasound for patency/adequacy.       3. Using real-time ultrasound the needle/catheter was observed entering the artery/vein.       5. The visualized structures were anatomically normal.       6. There were no apparent abnormal pathologic findings.       Catheter Type/Size: 20 gauge, 1.75 in/4.5 cm quick cath (integral wire)  Narrative        Tegaderm dressing used.       Complications: None apparent,        Arterial waveform: Yes        IBP within 10% of NIBP: Yes

## 2022-06-28 NOTE — ANESTHESIA PROCEDURE NOTES
Airway       Patient location during procedure: OR       Procedure Start/Stop Times: 6/28/2022 1:17 PM  Staff -        CRNA: Ray Varner APRN CRNA       Performed By: CRNA  Consent for Airway        Urgency: elective  Indications and Patient Condition       Indications for airway management: makenna-procedural       Induction type:intravenous       Mask difficulty assessment: 1 - vent by mask    Final Airway Details       Final airway type: endotracheal airway       Successful airway: ETT - single  Endotracheal Airway Details        ETT size (mm): 8.0       Cuffed: yes       Cuff volume (mL): 10       Successful intubation technique: video laryngoscopy       VL Blade Size: Glidescope 4       Grade View of Cords: 1       Adjucts: stylet       Position: Right       Measured from: gums/teeth       Secured at (cm): 23       Bite block used: None    Post intubation assessment        Placement verified by: capnometry, equal breath sounds and chest rise        Number of attempts at approach: 1       Secured with: pink tape       Ease of procedure: easy       Dentition: Intact and Unchanged    Medication(s) Administered   Medication Administration Time: 6/28/2022 1:17 PM

## 2022-06-28 NOTE — ANESTHESIA PREPROCEDURE EVALUATION
Anesthesia Pre-Procedure Evaluation    Patient: Connor Emerson   MRN: [id number] : 1980        Procedure : Procedure(s):  Left stealth craniotomy for tumor resection with motor mapping          Past Medical History:   Diagnosis Date     Atypical chest pain 2013     Cancer (H)      Complication of anesthesia      Diabetes (H)      GERD (gastroesophageal reflux disease) 2013     HTN (hypertension) 2012     HTN, goal below 140/90 2013     Insomnia 2012     Mediastinal lymphadenopathy      Migraine headache 2013     Migraine with aura, without mention of intractable migraine without mention of status migrainosus      Pneumonia       Past Surgical History:   Procedure Laterality Date     BRONCHOSCOPY RIGID OR FLEXIBLE W/TRANSENDOSCOPIC ENDOBRONCHIAL ULTRASOUND GUIDED Bilateral 2022    Procedure: Right BRONCHOSCOPY, FIBEROPTIC, endobronchial ultrasound, pleural biopsy;  Surgeon: Dallin Agrawal MD;  Location: [facility]     INJECT BLOCK MEDIAL BRANCH CERVICAL/THORACIC/LUMBAR       INSERT CHEST TUBE Right 2022    Procedure: INSERTION, CATHETER, INTERCOSTAL, FOR DRAINAGE;  Surgeon: Dallin Agrawal MD;  Location: [facility]     INSERT CHEST TUBE Right 3/9/2022    Procedure: INSERTION, CATHETER, INTERCOSTAL, FOR DRAINAGE;  Surgeon: Sushila Antonio MD;  Location: [facility]     IR CHEST TUBE REMOVAL TUNNELED RIGHT  2022     ORTHOPEDIC SURGERY      Ganesh. Rotator cuff repair.     PLEUROSCOPY N/A 2022    Procedure: Pleuroscopy with Pleural Biopsy;  Surgeon: Dallin Agrawal MD;  Location: [facility]      Allergies   Allergen Reactions     Vicodin [Hydrocodone-Acetaminophen] Nausea and Vomiting and GI Disturbance      Social History     Tobacco Use     Smoking status: Never Smoker     Smokeless tobacco: Never Used   Substance Use Topics     Alcohol use: Yes     Alcohol/week: 0.0 standard drinks     Comment: social      Wt Readings from Last 1 Encounters:   22 91.6 kg (202 lb)         Anesthesia Evaluation            ROS/MED HX  ENT/Pulmonary: Comment: Metastatic Lung CA    (+) recent URI,     Neurologic: Comment: Mets to the brain. Vasogenic cerebral edema. Recent steroid use.     (+) migraines,     Cardiovascular:     (+) hypertension-----    METS/Exercise Tolerance:     Hematologic:  - neg hematologic  ROS     Musculoskeletal:  - neg musculoskeletal ROS     GI/Hepatic:     (+) GERD,     Renal/Genitourinary:       Endo:     (+) type I DM, Using insulin, - not using insulin pump. Obesity,     Psychiatric/Substance Use:       Infectious Disease:       Malignancy:       Other:            Physical Exam    Airway        Mallampati: II   TM distance: > 3 FB   Neck ROM: full   Mouth opening: > 3 cm    Respiratory Devices and Support         Dental  no notable dental history         Cardiovascular   cardiovascular exam normal          Pulmonary   pulmonary exam normal                OUTSIDE LABS:  CBC:   Lab Results   Component Value Date    WBC 15.1 (H) 06/28/2022    WBC 17.0 (H) 06/27/2022    HGB 15.4 06/28/2022    HGB 15.0 06/27/2022    HCT 44.9 06/28/2022    HCT 44.9 06/27/2022     06/28/2022     06/27/2022     BMP:   Lab Results   Component Value Date     06/27/2022     06/17/2022    POTASSIUM 3.8 06/28/2022    POTASSIUM 4.2 06/27/2022    CHLORIDE 101 06/27/2022    CHLORIDE 105 06/17/2022    CO2 29 06/27/2022    CO2 29 06/17/2022    BUN 14 06/27/2022    BUN 20 06/17/2022    CR 0.58 (L) 06/27/2022    CR 0.56 (L) 06/17/2022     (H) 06/28/2022     (HH) 06/27/2022     COAGS:   Lab Results   Component Value Date    PTT 21 (L) 06/28/2022    INR 0.90 06/28/2022     POC:   Lab Results   Component Value Date     (H) 04/27/2017     HEPATIC:   Lab Results   Component Value Date    ALBUMIN 3.9 06/14/2022    PROTTOTAL 6.5 (L) 06/14/2022    ALT 20 06/14/2022    AST 13 06/14/2022    ALKPHOS 134 06/14/2022    BILITOTAL 0.8 06/14/2022     OTHER:   Lab Results    Component Value Date    PH 7.40 04/02/2022    LACT 1.1 04/02/2022    A1C 12.3 (H) 06/24/2022    TORY 8.6 06/27/2022    LIPASE 98 01/11/2006    TSH 1.81 01/19/2022    CRP 97.7 (H) 04/05/2022    SED 7 06/01/2016       Anesthesia Plan    ASA Status:  3   NPO Status:  NPO Appropriate    Anesthesia Type: General.     - Airway: ETT   Induction: Intravenous, Propofol.   Maintenance: TIVA.   Techniques and Equipment:     - Lines/Monitors: Arterial Line, 2nd IV     Consents    Anesthesia Plan(s) and associated risks, benefits, and realistic alternatives discussed. Questions answered and patient/representative(s) expressed understanding.    - Discussed:     - Discussed with:  Patient         Postoperative Care    Pain management: IV analgesics.   PONV prophylaxis: Ondansetron (or other 5HT-3), Dexamethasone or Solumedrol     Comments:    Other Comments: Steroids per Dr Moran  Remifentanil gtt  Precedex gtt  Propofol gtt            Rudi Rivers DO, DO

## 2022-06-28 NOTE — TELEPHONE ENCOUNTER
Spoke to Radha Shetty about this.  She hesitates to give him insulin now as he is not eating without him being monitored.  She is advising they need to assess it when he gets to the hospital.  Radha advised they tried to help the pt yesterday.  He had 2 [facility] pharmacists appts, but neither one happened.  We tried several times to get a hold of the pt.      Called Id[facility] and left a message to call us back.

## 2022-06-28 NOTE — ANESTHESIA CARE TRANSFER NOTE
Patient: Connor Emerson    Procedure: Procedure(s):  Left stealth craniotomy for tumor resection with motor mapping       Diagnosis: Brain metastasis (H) [C79.31]  Vasogenic cerebral edema (H) [G93.6]  Diagnosis Additional Information: No value filed.    Anesthesia Type:   General     Note:    Oropharynx: oropharynx clear of all foreign objects  Level of Consciousness: awake  Oxygen Supplementation: face mask    Independent Airway: airway patency satisfactory and stable  Dentition: dentition unchanged  Vital Signs Stable: post-procedure vital signs reviewed and stable  Report to RN Given: handoff report given  Patient transferred to: PACU    Handoff Report: Identifed the Patient, Identified the Reponsible Provider, Reviewed the pertinent medical history, Discussed the surgical course, Reviewed Intra-OP anesthesia mangement and issues during anesthesia, Set expectations for post-procedure period and Allowed opportunity for questions and acknowledgement of understanding        Electronically Signed By: JERRY Cassidy CRNA  June 28, 2022  2:52 PM

## 2022-06-28 NOTE — CONSULTS
[facility]  Consult Note - Hospitalist Service     Date of Admission:  6/28/2022  Consult Requested by: Joann Monroy PA-C  Reason for Consult: Medical management-glucose  PRIMARY CARE PROVIDER:    Radha Shetty Ra    Assessment & Plan   Connor Emerson is a 42 year old male admitted on 6/28/2022.    Past medical history significant for Metastatic lung cancer with mets to the brain, Vasogenic cerebral edema, Insulin dependent DM2, Overweight, HTN, GERD, MDD with anxiety, Migraines, Insomnia who underwent an elective left stealth craniotomy with intraop SSEP phase reversal and resection of brain tumor with microdissection.      Metastatic lung cancer with mets to the brain  Vasogenic cerebral edema  S/p left stealth craniotomy with intraop SSEP phase reversal and resection of brain tumor with microdissection  POD# 0. Follows with Oncology as an outpatient.    - Neurosurgery is managing.   --Defer analgesic management, DVT prophylaxis, PT/OT.     --Defer steroid management and resumption of PTA alectinib 450 mg BID and Keppra 1000 BID to surgery.    - HGB check in the morning.    - Encourage utilization of incentive spirometer.     Insulin dependent DM2, uncontrolled  Steroid and stress induced hyperglycemia  A1c of 12.3% from 6/24/22.  PTA medications include Lantus 20 units BID.  Patient was taking Decadron 4 mg every morning PTA and now will be receiving IV Decadron taper.  - Currently on clear liquid diet; advance per Neurosurgery (recommned mod carb).  - Resumed on Lantus 20 units BID.     --Will adjust Lantus daily PRN.    - High intensity sliding scale.    - Prandial insulin with 1 unit per 15 grams of carbs.    - Glucose checks QID and at 2AM.    - Hypoglycemic protocol.      Overweight  Body mass index is 29.83 kg/m .  Increase in all-cause morbidity and mortality.   - Encourage weight loss.  - Follow up with PCP regarding ongoing management.      HTN  - Goal SBP < 140.    -  "Resumed PTA hydrochlorothiazide 25 mg every morning.  Hold parameters in place.      GERD  Patient is no longer PTA Pepcid 40 mg/d.    - Will discontinue Pepcid at this time.      MDD with anxiety  Discussed PTA Celexa medication with the patient and his wife.  This medication was started the day prior to surgery and believed it was only for preparation leading up to the surgery.  The patient does not want to continue this medication.    - Stop PTA Celexa and will need to address whether to continue at discharge or follow up with prescribing provider.      Migraines  Not currently on any medications.    - Defer analgesic management to Neurosurgery.      Insomnia  Not currently on any medications.      Clinically Significant Risk Factors Present on Admission                    # Overweight: Estimated body mass index is 29.83 kg/m  as calculated from the following:    Height as of this encounter: 1.753 m (5' 9\").    Weight as of this encounter: 91.6 kg (202 lb).          Diet: Advance Diet as Tolerated: Clear Liquid Diet     DVT Prophylaxis: Defer to primary service   Heart Catheter: Not present  Central Lines: None  Cardiac Monitoring: None  Code Status: Full Code; confirmed with the patient.      Disposition Plan    Per Neurosurgery       The patient's care was discussed with the Bedside Nurse, Patient and Patient's Family.    The patient has been discussed with Dr. Bledsoe, who agrees with the assessment and plan at this time.    At this time, I'd like to thank Joann Monroy PA-C for consulting the Hospitalist service.  We will continue to follow.        Art Mustafa PA-C  [facility]  Securely message with the [employer] Web Console (learn more here)  Text page via [employer] Paging/Directory    ______________________________________________________________________    Chief Complaint   Elective left stealth craniotomy with intraop SSEP phase reversal and resection of brain tumor with " microdissection.    History is obtained from the patient and EMR.      History of Present Illness   Connor Emerson is a 42 year old male with a past medical history significant for Metastatic lung cancer with mets to the brain, Vasogenic cerebral edema, Insulin dependent DM2, Overweight, HTN, GERD, MDD with anxiety, Migraines, Insomnia who underwent an elective left stealth craniotomy with intraop SSEP phase reversal and resection of brain tumor with microdissection.      Surgery was performed under general anesthesia.  EBL was documented at 50 ml.      Patient was seen in his hospital room where he was lying comfortably in bed upon arrival.  Patient's wife was present at the bedside.  Initially, we reviewed his medical and surgical history as well as his home medications.  Patient indicated that he is no longer taking Pepcid.  We discussed his prescribed Celexa and he indicated that he thought that this was a medication to be used in preparation for his surgery.  He did not think that it would be a long-lasting medication and as such requested that he not take this medication or it be given to him.    Upon questioning, patient indicated that he has had some fatigue and weakness lately but it has not been too terrible.  He has experienced some chest palpitations previously but none currently.  He has baseline shortness of breath secondary to his lung cancer.  Both the patient and his wife indicated that his speech has improved since the surgery.    Reviewed plans regarding diabetes management.  Informed the patient and his wife that he is currently receiving IV Decadron and to anticipate some elevated blood sugars.  Discussed plans regarding sliding scale insulin as well as prandial insulin and resuming home Lantus.    Review of Systems   The 10 point Review of Systems is negative other than noted in the HPI.    Past Medical History    I have reviewed this patient's medical history and updated it with pertinent  information if needed.   Past Medical History:   Diagnosis Date     Atypical chest pain 12/02/2013     Cancer (H)      Complication of anesthesia      Diabetes (H)      GERD (gastroesophageal reflux disease) 12/02/2013     HTN (hypertension) 05/14/2012     HTN, goal below 140/90 07/02/2013     Insomnia 02/21/2012     Mediastinal lymphadenopathy      Migraine headache 07/02/2013     Migraine with aura, without mention of intractable migraine without mention of status migrainosus      Pneumonia    Metastatic lung cancer with mets to the brain, Vasogenic cerebral edema, Insulin dependent DM2, Overweight, HTN, GERD, MDD with anxiety, Migraines, Insomnia    Past Surgical History   I have reviewed this patient's surgical history and updated it with pertinent information if needed.  Past Surgical History:   Procedure Laterality Date     BRONCHOSCOPY RIGID OR FLEXIBLE W/TRANSENDOSCOPIC ENDOBRONCHIAL ULTRASOUND GUIDED Bilateral 1/26/2022    Procedure: Right BRONCHOSCOPY, FIBEROPTIC, endobronchial ultrasound, pleural biopsy;  Surgeon: Dallin Agrawal MD;  Location: [facility] OR     INJECT BLOCK MEDIAL BRANCH CERVICAL/THORACIC/LUMBAR       INSERT CHEST TUBE Right 2/16/2022    Procedure: INSERTION, CATHETER, INTERCOSTAL, FOR DRAINAGE;  Surgeon: Dallin Agrawal MD;  Location: [facility]     INSERT CHEST TUBE Right 3/9/2022    Procedure: INSERTION, CATHETER, INTERCOSTAL, FOR DRAINAGE;  Surgeon: Sushila Antonio MD;  Location: [facility]     IR CHEST TUBE REMOVAL TUNNELED RIGHT  4/2/2022     ORTHOPEDIC SURGERY      Ganesh. Rotator cuff repair.     PLEUROSCOPY N/A 1/26/2022    Procedure: Pleuroscopy with Pleural Biopsy;  Surgeon: Dallin Agrawal MD;  Location: [facility]       Social History   I have reviewed this patient's social history and updated it with pertinent information if needed.  Patient resides in an apartment in [place], [place] with his wife, kids and dogs.  He has never smoked.  He consumes alcohol socially.  He does not use  illicit drugs.    Social History     Tobacco Use     Smoking status: Never Smoker     Smokeless tobacco: Never Used   Vaping Use     Vaping Use: Never used   Substance Use Topics     Alcohol use: Yes     Alcohol/week: 0.0 standard drinks     Comment: social     Drug use: No       Family History   I have reviewed this patient's family history and updated it with pertinent information if needed.   Family History   Problem Relation Age of Onset     Unknown/Adopted Mother      Uterine Cancer Mother      Unknown/Adopted Father      Unknown/Adopted Maternal Grandmother      Unknown/Adopted Maternal Grandfather      Stomach Cancer Maternal Grandfather      Unknown/Adopted Paternal Grandmother      Unknown/Adopted Paternal Grandfather      Melanoma Maternal Uncle    Patient indicated that there is cancer in multiple members of his family the maternal side.  He denied any history of heart attacks, stroke, diabetes.    Medications   Prior to Admission Medications   Prescriptions Last Dose Informant Patient Reported? Taking?   alcohol swab prep pads  Self No No   Sig: Use to swab area of injection/jabier as directed.   alectinib (ALECENSA) 150 MG CAPS 6/27/2022 at 2100  No Yes   Sig: Take 3 capsules (450 mg) by mouth 2 times daily (with meals)   blood glucose (NO BRAND SPECIFIED) lancets standard  Self No No   Sig: Use to test blood sugar 1 times daily or as directed.   blood glucose (NO BRAND SPECIFIED) test strip  Self No No   Sig: Use to test blood sugar 1 times daily or as directed.   blood glucose (NO BRAND SPECIFIED) test strip  Self No No   Sig: Use to test blood sugar 2 times daily or as directed. To accompany: Blood Glucose Monitor Brands: per insurance.   blood glucose calibration (NO BRAND SPECIFIED) solution  Self No No   Sig: To accompany: Blood Glucose Monitor Brands: per insurance.   blood glucose monitoring (NO BRAND SPECIFIED) meter device kit  Self No No   Sig: Use to test blood sugar 1 times daily or as  directed.   blood glucose monitoring (NO BRAND SPECIFIED) meter device kit  Self No No   Sig: Use to test blood sugar 2 times daily or as directed. Preferred blood glucose meter OR supplies to accompany: Blood Glucose Monitor Brands: per insurance.   citalopram (CELEXA) 20 MG tablet 6/27/2022 at 2100  No Yes   Sig: Take 1 tablet (20 mg) by mouth every evening   dexamethasone (DECADRON) 4 MG tablet 6/27/2022 at 0800  Yes Yes   Sig: Take 1 tablet (4 mg) by mouth daily (with breakfast)   famotidine (PEPCID) 40 MG tablet More than a month at Unknown time Self Yes Yes   Sig: Take 40 mg by mouth daily   hydrochlorothiazide (HYDRODIURIL) 25 MG tablet 6/27/2022 at 0800  No Yes   Sig: Take 1 tablet (25 mg) by mouth in the morning.   insulin glargine (LANTUS PEN) 100 UNIT/ML pen 6/27/2022 at 2100  No Yes   Sig: Inject 20 Units Subcutaneous 2 times daily   insulin pen needle (31G X 8 MM) 31G X 8 MM miscellaneous  Self No No   Sig: Use one pen needles daily or as directed.   levETIRAcetam (KEPPRA) 1000 MG tablet never  No No   Sig: Take 1 tablet (1,000 mg) by mouth 2 times daily   metoclopramide (REGLAN) 5 MG tablet  Self No No   Sig: Take 1 tablet (5 mg) by mouth 3 times daily as needed (hiccups)   nystatin (MYCOSTATIN) 599357 UNIT/ML suspension   No No   Sig: Take 2 mLs (200,000 Units) by mouth 4 times daily   Patient not taking: No sig reported   oxyCODONE (ROXICODONE) 5 MG tablet Past Month at Unknown time  No Yes   Sig: Take 1 tablet (5 mg) by mouth every 6 hours as needed for pain   prochlorperazine (COMPAZINE) 10 MG tablet  Self No No   Sig: Take 1 tablet (10 mg) by mouth every 6 hours as needed (Nausea/Vomiting)   thin (NO BRAND SPECIFIED) lancets  Self No No   Sig: Use with lanceting device. To accompany: Blood Glucose Monitor Brands: per insurance.      Facility-Administered Medications: None     Allergies   Allergies   Allergen Reactions     Vicodin [Hydrocodone-Acetaminophen] Nausea and Vomiting and GI Disturbance  "      Physical Exam   /87   Pulse 69   Temp 98.2  F (36.8  C)   Resp 18   Ht 1.753 m (5' 9\")   Wt 91.6 kg (202 lb)   SpO2 96%   BMI 29.83 kg/m      Constitutional: Awake, alert, cooperative, no apparent distress.    ENT: Normocephalic, left sided craniotomy surgical site present and appeared c/d/i.  Oral pharynx with moist mucus membranes, tonsils without erythema or exudates.  Eyes pupils are equal, round and reactive to light; extra occular movements intact.  Normal sclera.    Neck: Supple, symmetrical, trachea midline, no adenopathy.  Pulmonary: No increased work of breathing, good air exchange, clear to auscultation bilaterally, no crackles or wheezing.  Cardiovascular: Regular rate and rhythm, normal S1 and S2, no S3 or S4, and no murmur noted.  GI: Normal bowel sounds, soft, non-distended, non-tender.    Skin/Integumen: Visualized skin appeared clear.  Neuro: CN II-XII grossly intact.  Upper and lower extremities strength, coordination and sensation intact bilaterally.    Psych:  Alert and oriented x 3. Normal affect.  Extremities: No lower extremity edema noted, and calves are non-tender to palpation bilaterally.      Data   I personally reviewed no images or EKG's today.  Results for orders placed or performed during the hospital encounter of 06/28/22 (from the past 24 hour(s))   CBC with platelets differential    Narrative    The following orders were created for panel order CBC with platelets differential.  Procedure                               Abnormality         Status                     ---------                               -----------         ------                     CBC with platelets and d...[[id number]]  Abnormal            Final result                 Please view results for these tests on the individual orders.   INR   Result Value Ref Range    INR 0.90 0.85 - 1.15   Partial thromboplastin time   Result Value Ref Range    aPTT 21 (L) 22 - 38 Seconds   ABO/Rh type and screen    " Narrative    The following orders were created for panel order ABO/Rh type and screen.  Procedure                               Abnormality         Status                     ---------                               -----------         ------                     Adult Type and Screen[[id number]]                            Final result                 Please view results for these tests on the individual orders.   Potassium - Pre-Op   Result Value Ref Range    Potassium 3.8 3.4 - 5.3 mmol/L   Glucose - Pre-Op   Result Value Ref Range    Glucose 370 (H) 70 - 99 mg/dL   CBC with platelets and differential   Result Value Ref Range    WBC Count 15.1 (H) 4.0 - 11.0 10e3/uL    RBC Count 4.91 4.40 - 5.90 10e6/uL    Hemoglobin 15.4 13.3 - 17.7 g/dL    Hematocrit 44.9 40.0 - 53.0 %    MCV 91 78 - 100 fL    MCH 31.4 26.5 - 33.0 pg    MCHC 34.3 31.5 - 36.5 g/dL    RDW 13.1 10.0 - 15.0 %    Platelet Count 231 150 - 450 10e3/uL    % Neutrophils 63 %    % Lymphocytes 29 %    % Monocytes 5 %    % Eosinophils 2 %    % Basophils 0 %    % Immature Granulocytes 1 %    NRBCs per 100 WBC 0 <1 /100    Absolute Neutrophils 9.5 (H) 1.6 - 8.3 10e3/uL    Absolute Lymphocytes 4.3 0.8 - 5.3 10e3/uL    Absolute Monocytes 0.8 0.0 - 1.3 10e3/uL    Absolute Eosinophils 0.3 0.0 - 0.7 10e3/uL    Absolute Basophils 0.0 0.0 - 0.2 10e3/uL    Absolute Immature Granulocytes 0.1 <=0.4 10e3/uL    Absolute NRBCs 0.0 10e3/uL   Adult Type and Screen   Result Value Ref Range    ABO/RH(D) O POS     Antibody Screen Negative Negative    SPECIMEN EXPIRATION DATE [id number]    Glucose by meter   Result Value Ref Range    GLUCOSE BY METER POCT 235 (H) 70 - 99 mg/dL   Glucose by meter   Result Value Ref Range    GLUCOSE BY METER POCT 236 (H) 70 - 99 mg/dL   Glucose by meter   Result Value Ref Range    GLUCOSE BY METER POCT 202 (H) 70 - 99 mg/dL   Glucose by meter   Result Value Ref Range    GLUCOSE BY METER POCT 291 (H) 70 - 99 mg/dL   Glucose by meter   Result  Value Ref Range    GLUCOSE BY METER POCT 357 (H) 70 - 99 mg/dL

## 2022-06-28 NOTE — OP NOTE
Procedure Date: 06/28/2022    PREOPERATIVE DIAGNOSIS:  Brain metastasis from metastatic lung cancer, status post radiosurgery, recurrence versus radiation necrosis.    POSTOPERATIVE DIAGNOSIS:  Brain metastasis from metastatic lung cancer, status post radiosurgery, recurrence versus radiation necrosis.    PROCEDURE:  Left Stealth craniotomy with intraoperative SSEP phase reversal and resection of brain tumor with microdissection.    SURGEON:  Stephen Moran MD.    ASSISTANT:  Marva Coronel PA-C.    ANESTHESIA:  General endotracheal anesthesia.    ESTIMATED BLOOD LOSS:  50 mL.    INDICATIONS FOR PROCEDURE:  The patient is a 42-year-old male with a history of metastatic lung cancer, who previously underwent radiosurgery to multiple brain metastases.  His lesion in his left frontal lobe has increased in size with a significant increase in vasogenic cerebral edema with significant symptoms and brain compression, although he improved on steroids.  He was brought to the operating room for resection of the lesion in order to provide both diagnostic certainty and help accelerate recovery from his symptomatic recovery. Please note that Marva Coronel PA-C's assistance was needed for positioning, retraction, suctioning, and closure.     DESCRIPTION OF PROCEDURE:  The patient was brought to the operating room.  General endotracheal anesthesia was induced.  The patient was positioned supine and the Chester head hopkins with the head slightly turned to the right.  The [employer] registration system was registered and used to plan the incision and craniotomy.  The left side of the head was prepped and draped in sterile fashion and a linear incision was made over the planned craniotomy.  A single bur hole was placed posteriorly and then the bone flap was turned around the intended tumor resection.  Once this was done, the dura was opened and reflected laterally.  A strip electrode was placed posteriorly and SSEP phase reversal was run  which showed good separation from the central sulcus.  Once this was done, a small corticectomy was made directly over the tumor and the tumor and cyst cavity was identified.  Specimens were obtained for permanent pathology, both via direct resection and using the Sonopet to resect the margins of the lesion.  Once this was done and hemostasis was achieved, the dura was reapproximated with 4-0 Nurolon.  The bone flap was plated back into place with the [employer] low profile cranial plating system.  The galea was closed with 2-0 inverted interrupted Vicryl and a running 3-0 nylon was used for skin.  The patient was awakened, extubated, and taken to recovery room in good condition.    Stephen Moran MD        D: 2022   T: 2022   MT: BARI    Name:     CAMILA CORREIA  MRN:      [id number]        Account:        [id number]   :      1980           Procedure Date: 2022     Document: [id number]

## 2022-06-28 NOTE — ANESTHESIA POSTPROCEDURE EVALUATION
Patient: Connor Emerson    Procedure: Procedure(s):  Left stealth craniotomy for tumor resection with motor mapping       Anesthesia Type:  General    Note:  Disposition: Inpatient   Postop Pain Control: Uneventful            Sign Out: Well controlled pain   PONV: No   Neuro/Psych: Uneventful            Sign Out: Acceptable/Baseline neuro status   Airway/Respiratory: Uneventful            Sign Out: Acceptable/Baseline resp. status   CV/Hemodynamics: Uneventful            Sign Out: Acceptable CV status   Other NRE: NONE   DID A NON-ROUTINE EVENT OCCUR? No           Last vitals:  Vitals Value Taken Time   /87 06/28/22 1641   Temp 36.7  C (98  F) 06/28/22 1600   Pulse 59 06/28/22 1646   Resp 13 06/28/22 1646   SpO2 95 % 06/28/22 1646   Vitals shown include unvalidated device data.    Electronically Signed By: Boni Carl MD  June 28, 2022  4:48 PM

## 2022-06-28 NOTE — PROGRESS NOTES
Post op note    42-year-old male with a history of metastatic lung cancer, who previously underwent radiosurgery to multiple brain metastases.  His lesion in his left frontal lobe has increased in size with a significant increase in vasogenic cerebral edema with significant symptoms and brain compression. POD0 s/p left Stealth craniotomy with intraoperative SSEP phase reversal and resection of brain tumor with microdissection with Dr. Moran. EBL 50mL. No intraoperative complications.     PLAN:   -ICU overnight   -HOB30  -SBP<140  -Pain management  -Continue keppra  -Decadron 4q6 x 48 hours then wean 2 weeks to off- this taper has been placed  -Brain MRI w/wo contrast tomorrow   -Plans reviewed with Dr. Moran   -Call or page on call JULIO with questions    Marva AMEZCUA [facility]  [address] [address]    Tel [phone]

## 2022-06-28 NOTE — TELEPHONE ENCOUNTER
Received a call from Jj from Dr. Moran's office at [facility] [phone].    Pt saw Radha for his pre-op.  His blood sugar runs high.  His sugar last night was in the 400's.  This morning it was 324.  He is NPO now.  She is wondering if Radha Shetty wants some insulin to cover him.  He usually uses lantus insulin.      He didn't meet with [facility] yesterday.      The surgery is at noon today      Call wife, Candance at [phone] as pt turns off phone.  No consent to communicate.  Jj advised to call her then and she will get a hold of them.

## 2022-06-28 NOTE — BRIEF OP NOTE
[facility]    Brief Operative Note    Pre-operative diagnosis: Brain metastasis (H) [C79.31]  Vasogenic cerebral edema (H) [G93.6]  Post-operative diagnosis Same as pre-operative diagnosis    Procedure: Procedure(s):  Left stealth craniotomy for tumor resection with motor mapping  Surgeon: Surgeon(s) and Role:     * Stephen Moran MD - Primary     * Marva Coronel PA-C - Assisting  Anesthesia: General   Estimated Blood Loss: 50ml    Drains: None  Specimens:   ID Type Source Tests Collected by Time Destination   1 : LEFT FRONTAL TUMOR Tissue Brain SURGICAL PATHOLOGY EXAM Stephen Moran MD 6/28/2022  2:02 PM      Findings:   None.  Complications: None.  Implants:   Implant Name Type Inv. Item Serial No.  Lot No. LRB No. Used Action   IMP PLATE SYN THEA HOLE COVER 17MM 04.503.023 - [id number] Metallic Hardware/[employer] IMP PLATE SYN THEA HOLE COVER 17MM 04.503.023  SYNTHES-STRATEC 42    08 [id number] 2022 Left 1 Implanted   IMP PLATE SYN MATRIXNEURO STR 2 HOLE 12MM  04.503.062 - [id number] Metallic Hardware/[employer] IMP PLATE SYN MATRIXNEURO STR 2 HOLE 12MM  04.503.062  SYNTHES-STRATEC 42    08 08JUN 2022 Left 2 Implanted   IMP SCR SYN MATRIX LOW PRO 1.5X04MM SELF DRILL 04.503.104.01 - [id number] Metallic Hardware/[employer] IMP SCR SYN MATRIX LOW PRO 1.5X04MM SELF DRILL [id number]  SYNTHES-STRATEC 42    08 08JUN 2022 Left 8 Implanted     [id number]

## 2022-06-29 ENCOUNTER — APPOINTMENT (OUTPATIENT)
Dept: OCCUPATIONAL THERAPY | Facility: CLINIC | Age: 42
End: 2022-06-29
Attending: PHYSICIAN ASSISTANT
Payer: COMMERCIAL

## 2022-06-29 ENCOUNTER — APPOINTMENT (OUTPATIENT)
Dept: PHYSICAL THERAPY | Facility: CLINIC | Age: 42
End: 2022-06-29
Attending: PHYSICIAN ASSISTANT
Payer: COMMERCIAL

## 2022-06-29 ENCOUNTER — APPOINTMENT (OUTPATIENT)
Dept: MRI IMAGING | Facility: CLINIC | Age: 42
End: 2022-06-29
Attending: PHYSICIAN ASSISTANT
Payer: COMMERCIAL

## 2022-06-29 LAB
GLUCOSE BLDC GLUCOMTR-MCNC: 269 MG/DL (ref 70–99)
GLUCOSE BLDC GLUCOMTR-MCNC: 282 MG/DL (ref 70–99)
GLUCOSE BLDC GLUCOMTR-MCNC: 318 MG/DL (ref 70–99)
GLUCOSE BLDC GLUCOMTR-MCNC: 361 MG/DL (ref 70–99)

## 2022-06-29 PROCEDURE — A9585 GADOBUTROL INJECTION: HCPCS | Performed by: NEUROLOGICAL SURGERY

## 2022-06-29 PROCEDURE — 97161 PT EVAL LOW COMPLEX 20 MIN: CPT | Mod: GP

## 2022-06-29 PROCEDURE — 99233 SBSQ HOSP IP/OBS HIGH 50: CPT | Performed by: PHYSICIAN ASSISTANT

## 2022-06-29 PROCEDURE — 250N000013 HC RX MED GY IP 250 OP 250 PS 637: Performed by: PHYSICIAN ASSISTANT

## 2022-06-29 PROCEDURE — 250N000011 HC RX IP 250 OP 636: Performed by: NEUROLOGICAL SURGERY

## 2022-06-29 PROCEDURE — 97165 OT EVAL LOW COMPLEX 30 MIN: CPT | Mod: GO | Performed by: OCCUPATIONAL THERAPIST

## 2022-06-29 PROCEDURE — 120N000001 HC R&B MED SURG/OB

## 2022-06-29 PROCEDURE — 250N000012 HC RX MED GY IP 250 OP 636 PS 637: Performed by: PHYSICIAN ASSISTANT

## 2022-06-29 PROCEDURE — 250N000011 HC RX IP 250 OP 636: Performed by: PHYSICIAN ASSISTANT

## 2022-06-29 PROCEDURE — 97116 GAIT TRAINING THERAPY: CPT | Mod: GP

## 2022-06-29 PROCEDURE — 255N000002 HC RX 255 OP 636: Performed by: NEUROLOGICAL SURGERY

## 2022-06-29 PROCEDURE — 70553 MRI BRAIN STEM W/O & W/DYE: CPT

## 2022-06-29 RX ORDER — DEXAMETHASONE 1 MG
1 TABLET ORAL DAILY
Status: DISCONTINUED | OUTPATIENT
Start: 2022-07-06 | End: 2022-06-30 | Stop reason: HOSPADM

## 2022-06-29 RX ORDER — DEXAMETHASONE 4 MG/1
4 TABLET ORAL 2 TIMES DAILY
Status: DISCONTINUED | OUTPATIENT
Start: 2022-06-29 | End: 2022-06-30 | Stop reason: HOSPADM

## 2022-06-29 RX ORDER — GADOBUTROL 604.72 MG/ML
10 INJECTION INTRAVENOUS ONCE
Status: COMPLETED | OUTPATIENT
Start: 2022-06-29 | End: 2022-06-29

## 2022-06-29 RX ORDER — DEXAMETHASONE 4 MG/1
4 TABLET ORAL DAILY
Status: DISCONTINUED | OUTPATIENT
Start: 2022-07-02 | End: 2022-06-30 | Stop reason: HOSPADM

## 2022-06-29 RX ORDER — DEXAMETHASONE 2 MG/1
2 TABLET ORAL 2 TIMES DAILY
Status: DISCONTINUED | OUTPATIENT
Start: 2022-07-04 | End: 2022-06-30 | Stop reason: HOSPADM

## 2022-06-29 RX ADMIN — INSULIN GLARGINE 20 UNITS: 100 INJECTION, SOLUTION SUBCUTANEOUS at 07:50

## 2022-06-29 RX ADMIN — Medication 200000 UNITS: at 13:07

## 2022-06-29 RX ADMIN — GADOBUTROL 10 ML: 604.72 INJECTION INTRAVENOUS at 08:56

## 2022-06-29 RX ADMIN — LEVETIRACETAM 1000 MG: 500 TABLET, FILM COATED ORAL at 07:55

## 2022-06-29 RX ADMIN — INSULIN ASPART 10 UNITS: 100 INJECTION, SOLUTION INTRAVENOUS; SUBCUTANEOUS at 13:05

## 2022-06-29 RX ADMIN — INSULIN ASPART 5 UNITS: 100 INJECTION, SOLUTION INTRAVENOUS; SUBCUTANEOUS at 10:00

## 2022-06-29 RX ADMIN — INSULIN ASPART 9 UNITS: 100 INJECTION, SOLUTION INTRAVENOUS; SUBCUTANEOUS at 17:32

## 2022-06-29 RX ADMIN — DEXAMETHASONE 4 MG: 4 TABLET ORAL at 20:00

## 2022-06-29 RX ADMIN — DEXAMETHASONE SODIUM PHOSPHATE 4 MG: 4 INJECTION, SOLUTION INTRAMUSCULAR; INTRAVENOUS at 07:52

## 2022-06-29 RX ADMIN — Medication 200000 UNITS: at 07:55

## 2022-06-29 RX ADMIN — INSULIN ASPART 8 UNITS: 100 INJECTION, SOLUTION INTRAVENOUS; SUBCUTANEOUS at 13:04

## 2022-06-29 RX ADMIN — Medication 200000 UNITS: at 19:59

## 2022-06-29 RX ADMIN — ACETAMINOPHEN 975 MG: 325 TABLET ORAL at 08:44

## 2022-06-29 RX ADMIN — ACETAMINOPHEN 975 MG: 325 TABLET ORAL at 17:31

## 2022-06-29 RX ADMIN — Medication 200000 UNITS: at 16:26

## 2022-06-29 RX ADMIN — INSULIN ASPART 6 UNITS: 100 INJECTION, SOLUTION INTRAVENOUS; SUBCUTANEOUS at 07:47

## 2022-06-29 RX ADMIN — DEXAMETHASONE SODIUM PHOSPHATE 4 MG: 4 INJECTION, SOLUTION INTRAMUSCULAR; INTRAVENOUS at 01:12

## 2022-06-29 RX ADMIN — INSULIN ASPART 5 UNITS: 100 INJECTION, SOLUTION INTRAVENOUS; SUBCUTANEOUS at 17:33

## 2022-06-29 RX ADMIN — HYDROCHLOROTHIAZIDE 25 MG: 25 TABLET ORAL at 07:55

## 2022-06-29 RX ADMIN — ACETAMINOPHEN 975 MG: 325 TABLET ORAL at 01:12

## 2022-06-29 RX ADMIN — LEVETIRACETAM 1000 MG: 500 TABLET, FILM COATED ORAL at 19:59

## 2022-06-29 ASSESSMENT — ACTIVITIES OF DAILY LIVING (ADL)
ADLS_ACUITY_SCORE: 22

## 2022-06-29 NOTE — PLAN OF CARE
Pt. In room air, CV: SR with no ectopy, good perfusion with Bp within goal, clear lungs, voiding spontaneously at commode and ambulating well, good PO and meals eaten, no neurological deficits and neuro exam intact, pt' pleasant and supportive, transported by resource nurse in wheel chair to 7th floor without incident.

## 2022-06-29 NOTE — PROGRESS NOTES
[facility]    Medicine Progress Note - Hospitalist Service    Date of Admission:  6/28/2022    Assessment & Plan      Connor Emerson is a 42 year old male admitted on 6/28/2022.     Past medical history significant for metastatic lung cancer with mets to the brain, vasogenic cerebral edema, insulin dependent DM2, overweight, HTN, GERD, MDD with anxiety, migraines, insomnia who underwent an elective left stealth craniotomy with intraop SSEP phase reversal and resection of brain tumor with microdissection.       Metastatic lung cancer with mets to the brain  Vasogenic cerebral edema  S/p left stealth craniotomy with intraop SSEP phase reversal and resection of brain tumor with microdissection (6/28/2022)  Follows with Oncology as an outpatient. Post-op brain MRI w/wo contrast completed 6/29; he was transitioned to the floor by neurosurgery.  - Neurosurgery is managing.               --Defer analgesic management, DVT prophylaxis, PT/OT.                 --Defer steroid management and resumption of PTA alectinib 450 mg BID and Keppra 1000 BID to surgery.  - PT/OT  - Encourage utilization of incentive spirometer.      Insulin dependent DM2, uncontrolled  Steroid and stress induced hyperglycemia  A1c of 12.3% from 6/24/22. PTA medications include Lantus 20 units BID. Patient was taking Decadron 4 mg every morning PTA and now will be receiving IV Decadron taper.  - Diet advanced per Neurosurgery; recommend mod carb.  - Increase Lantus to 25 units BID.  - High intensity sliding scale.    - Adjust prandial insulin to be 1 unit per 8 grams of carbs with meals and snacks.    - Glucose checks QID and at 2AM.    - Hypoglycemic protocol.       Overweight  Body mass index is 29.83 kg/m . Increase in all-cause morbidity and mortality.   - Encourage weight loss.  - Follow up with PCP regarding ongoing management.       HTN  - Goal SBP < 140.    - Resumed PTA hydrochlorothiazide 25 mg every morning. Hold  "parameters in place.       GERD  Patient is no longer taking PTA Pepcid 40 mg/d.    - Will discontinue Pepcid at this time.       MDD with anxiety  Discussed PTA Celexa medication with the patient and his wife. This medication was started the day prior to surgery and believed it was only for preparation leading up to the surgery. The patient does not want to continue this medication.    - Stop PTA Celexa and will need to address whether to continue at discharge or follow up with prescribing provider.       Migraines  Not currently on any medications.    - Defer analgesic management to Neurosurgery.       Insomnia  Not currently on any medications.         Diet: Moderate Consistent Carb (60 g CHO per Meal) Diet    DVT Prophylaxis: Pneumatic Compression Devices  Heart Catheter: Not present  Central Lines: None  Cardiac Monitoring: None  Code Status: Full Code      Disposition Plan      Expected Discharge Date: 06/30/2022      Destination: home with family          The patient's care was discussed with the Attending Physician, Dr. Ordoñez, Bedside Nurse, Patient and Primary team.    [facility]  Securely message with the Vocera Web Console (learn more here)  Text page via PreApps Paging/Directory       I have independently evaluated the patient and agree with PA-S note above.     Mago Good PA-C        Clinically Significant Risk Factors Present on Admission                # Overweight: Estimated body mass index is 29.83 kg/m  as calculated from the following:    Height as of this encounter: 1.753 m (5' 9\").    Weight as of this encounter: 91.6 kg (202 lb).        ______________________________________________________________________    Interval History   Patient is afebrile with stable vital signs. No acute events overnight. He is neurologically intact without any focal deficits. The patient's diet has been advanced, and he was able to eat breakfast and " lunch today. Blood sugars notably elevated in high 200s-300s; poorly controlled at home. He denies any headaches, dizziness, lightheadedness, seizures, chest pain, shortness of breath, abdominal pain, nausea or vomiting. No flatus or BM yet. Urinating without difficulty. Standard post-op brain MRI this morning with potential to move out of ICU.     Data reviewed today: I reviewed all medications, new labs and imaging results over the last 24 hours.     Physical Exam   Vital Signs: Temp: 99  F (37.2  C) Temp src: Oral BP: 135/69 Pulse: 70   Resp: 16 SpO2: 94 % O2 Device: None (Room air) Oxygen Delivery: 2 LPM  Weight: 202 lbs 0 oz   Constitutional: Resting comfortably. Well-appearing. No acute distress.  HEENT: Normocephalic. Craniotomy incision over left scalp; healing well without surrounding erythema or drainage. PERRL, EOM grossly intact. Moist mucous membranes.  Respiratory: Lungs clear to auscultation bilaterally. No wheezes, rhonchi, or rales. No increased work of breathing.  Cardiovascular: Regular rate and rhythm, no obvious murmur. No peripheral edema. DP pulses 2+ and equal.  GI: Soft, non-tender, non-distended. Active bowel sounds.  Musculoskeletal: Moving all extremities. No calf tenderness to palpation.  Neurologic: Alert and oriented X3. CN II-XII grossly intact.   Skin: Warm and dry. No rashes. No cyanosis.      Data   Recent Labs   Lab 06/29/22  1303 06/29/22  0746 06/28/22  2125 06/28/22  1414 06/28/22  1109 06/27/22  0945   WBC  --   --   --   --  15.1* 17.0*   HGB  --   --   --   --  15.4 15.0   MCV  --   --   --   --  91 94   PLT  --   --   --   --  231 218   INR  --   --   --   --  0.90  --    NA  --   --   --   --   --  137   POTASSIUM  --   --   --   --  3.8 4.2   CHLORIDE  --   --   --   --   --  101   CO2  --   --   --   --   --  29   BUN  --   --   --   --   --  14   CR  --   --   --   --   --  0.58*   ANIONGAP  --   --   --   --   --  7   TORY  --   --   --   --   --  8.6   * 282*  378*   < > 370* 451*    < > = values in this interval not displayed.     Recent Results (from the past 24 hour(s))   MR Brain w/o & w Contrast    Narrative    MR BRAIN WITHOUT AND WITH CONTRAST 6/29/2022 9:52 AM    INDICATION: Status post left craniotomy for metastasis resection.    TECHNIQUE: Noncontrast and contrast enhanced MRI of the brain.  CONTRAST: 10 mL IV Gadavist.    COMPARISON: 6/27/2022 and 6/10/2022 brain MRIs.    FINDINGS:  Interval left frontoparietal craniotomy with  redemonstration of the rim-enhancing lesion in the left frontal lobe.  This now measures 2.1 x 2.2 x 3.0 cm, with a small amount of air  anteriorly and a layering hemorrhagic component posteriorly relating  to recent intervention. Enhancement at the margins of this cavity is  less conspicuous, with less noticeable thickening at the margins.  Adjacent FLAIR hyperintensity persists, with a moderate adjacent FLAIR  hyperintensity that has improved since previous.    Rim-enhancing lesion measuring 2.2 x 1.6 cm in AP and transverse  dimensions in the anterior-inferior right frontal lobe demonstrates an  interval decrease in size since 6/10/2022, at which time it measured  2.5 x 1.9 cm. Additional punctate focus of enhancement in the anterior  left frontal white matter appears unchanged, with an unchanged  punctate focus of enhancement at the inferior left frontal lobe. 9 mm  rim-enhancing lesion in the left cerebellar hemisphere is unchanged.  Additional 5 mm rim-enhancing lesion in the left cerebellar hemisphere  is subtle but similarly unchanged.    Left-sided thin dural enhancement is presumably postoperative in  nature, and attention on follow-up exams will be of benefit. No  additional extra-axial collection. Susceptibility relating to  hemorrhage/blood products again seen with the metastatic lesions. The  cerebellar lesions demonstrate an accompanying diffusion restriction.  No findings for acute infarct. Minimal left-to-right midline  shift  appears relatively unchanged. No parenchymal volume loss. Nondilated  ventricles. Major intracranial vascular flow voids are preserved.  Cerebellar tonsils are normally positioned.    Paranasal sinuses are clear. No fluid in the mastoid air cells. Recent  postoperative changes of a left frontoparietal craniotomy.  Unremarkable sella. Remainder unchanged.      Impression    IMPRESSION:   1.  Left frontoparietal craniotomy for left frontal lobe mass  resection with expected postoperative changes including a small amount  of air in the anterior margin of the resection cavity and layering  blood products posteriorly in the resection cavity. While there is a  persistent rim enhancement at the margins of this site, it is less  thickened and less conspicuous than it had been previously. The  rim-enhancing focus also decreased in size, now measuring 2.1 x 2.2 cm  in AP and transverse dimensions, as compared to 2.9 x 2.4 cm  previously. Accompanying thin left-sided dural enhancement is  presumably postoperative in nature.    2.  Additional metastatic lesions redemonstrated elsewhere. While the  cystic rim-enhancing lesion in the right frontal lobe has decreased in  size, smaller metastatic lesions described above appear relatively  unchanged.    3.  No superimposed acute intracranial abnormality.    VIDYA MARIA MD         SYSTEM ID:  [id number]

## 2022-06-29 NOTE — PLAN OF CARE
Hx of metastatic lung cancer with mets to the brain, POD#1 L frontoparietal crani resection of brain tumor with microdissection by Dr. Moran. Pt A&O x4. VSS, SBP<140, on RA. LS clear. CMS and Neuro's intact. Denies pain. Incision MCKENZIE, approximated, sutures intact. PT cleared patient to be up independently. Voiding in bathroom. +BS, BM yesterday per patient. Decadron taper. Mod CHO diet,  elevated BG, sliding scale insulin and carb count insulin given per MAR. Pt scoring green on Aggression Stop Light Tool. Plans to discharge to home tomorrow, pending.

## 2022-06-29 NOTE — PROGRESS NOTES
06/29/22 1444   Quick Adds   Type of Visit Initial PT Evaluation   Living Environment   People in Home spouse   Current Living Arrangements apartment   Home Accessibility no concerns   Transportation Anticipated family or friend will provide   Self-Care   Usual Activity Tolerance good   Current Activity Tolerance moderate   Regular Exercise No   Equipment Currently Used at Home none   Fall history within last six months yes   Activity/Exercise/Self-Care Comment Independent at baseline, works full time in [employer] as a supervisor. Teenage kids at home.   General Information   Onset of Illness/Injury or Date of Surgery 06/28/22   Referring Physician Marva Coronel PA-C   Patient/Family Therapy Goals Statement (PT) to go home   Pertinent History of Current Problem (include personal factors and/or comorbidities that impact the POC) Per chart:    Past medical history significant for Metastatic lung cancer with mets to the brain, Vasogenic cerebral edema, Insulin dependent DM2, Overweight, HTN, GERD, MDD with anxiety, Migraines, Insomnia who underwent an elective left stealth craniotomy with intraop SSEP phase reversal and resection of brain tumor with microdissection.   Existing Precautions/Restrictions fall   Weight-Bearing Status - LLE full weight-bearing   Weight-Bearing Status - RLE full weight-bearing   Cognition   Affect/Mental Status (Cognition) WFL   Orientation Status (Cognition) oriented x 4   Integumentary/Edema   Integumentary/Edema Comments incision CDI   Posture    Posture Not impaired   Range of Motion (ROM)   Range of Motion ROM is WNL   Strength (Manual Muscle Testing)   Strength (Manual Muscle Testing) strength is WNL   Bed Mobility   Bed Mobility no deficits identified   Transfers   Transfers no deficits identified   Comment, (Transfers) no device   Gait/Stairs (Locomotion)   Garrard Level (Gait) supervision   Distance in Feet (Required for LE Total Joints) 100ft   Comment, (Gait/Stairs)  steady without device   Balance   Balance Comments 24/24 on DGI, no deficits. See DGI section for details.   Coordination   Coordination no deficits were identified   Clinical Impression   Criteria for Skilled Therapeutic Intervention Yes, treatment indicated   PT Diagnosis (PT) impaired functional mobility   Influenced by the following impairments impaired activity tolerance   Functional limitations due to impairments gait   Clinical Presentation (PT Evaluation Complexity) Stable/Uncomplicated   Clinical Presentation Rationale clinical judgement   Clinical Decision Making (Complexity) low complexity   Planned Therapy Interventions (PT) gait training   Risk & Benefits of therapy have been explained evaluation/treatment results reviewed;care plan/treatment goals reviewed;risks/benefits reviewed;current/potential barriers reviewed;participants voiced agreement with care plan;participants included;patient   PT Discharge Planning   PT Discharge Recommendation (DC Rec) home with assist   PT Rationale for DC Rec Patient presents near his baseline for functional mobility, scoring 24/24 on DGI and is able to ambulate long hallway distances without device. Anticipated to be able to d/c home when medically stable w/ support of spouse as needed.   PT Brief overview of current status SUP   Total Evaluation Time   Total Evaluation Time (Minutes) 10   Physical Therapy Goals   PT Frequency One time eval and treatment only   PT Predicted Duration/Target Date for Goal Attainment 06/29/22   PT Goals Transfers;Gait   PT: Transfers Independent   PT: Gait Independent;Greater than 200 feet

## 2022-06-29 NOTE — PROGRESS NOTES
"   06/29/22 1015   Quick Adds   Type of Visit Initial Occupational Therapy Evaluation   Living Environment   People in Home spouse   Current Living Arrangements apartment   Home Accessibility no concerns   Transportation Anticipated family or friend will provide  (was driving up until a month ago)   Living Environment Comments tub shower with grab bars.   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 family helps with cooking, cleaning, laundry   General Information   Onset of Illness/Injury or Date of Surgery 06/28/22   Referring Physician Marva Coronel PA-C   Patient/Family Therapy Goal Statement (OT) \"I'm feeling so much better\"   Additional Occupational Profile Info/Pertinent History of Current Problem Past medical history significant for Metastatic lung cancer with mets to the brain, Vasogenic cerebral edema, Insulin dependent DM2, Overweight, HTN, GERD, MDD with anxiety, Migraines, Insomnia who underwent an elective left stealth craniotomy with intraop SSEP phase reversal and resection of brain tumor with microdissection.   Existing Precautions/Restrictions   (crani)   General Observations and Info pt supine in bed, just finishing lunch. agreeable to OT eval   Cognitive Status Examination   Orientation Status orientation to person, place and time   Follows Commands WNL   Cognitive Status Comments pt reports everything feels back to normal. he has not had any word finding difficulty, able to write and use his phone without difficulty   Visual Perception   Visual Impairment/Limitations WNL   Impact of Vision Impairment on Function (Vision) denies any vision changes   Sensory   Sensory Quick Adds No deficits were identified   Pain " Assessment   Patient Currently in Pain No   Integumentary/Edema   Integumentary/Edema no deficits were identifed   Posture   Posture not impaired   Range of Motion Comprehensive   General Range of Motion no range of motion deficits identified   Strength Comprehensive (MMT)   General Manual Muscle Testing (MMT) Assessment no strength deficits identified   Muscle Tone Assessment   Muscle Tone Quick Adds No deficits were identified   Coordination   Upper Extremity Coordination No deficits were identified   Bed Mobility   Bed Mobility No deficits identified   Transfers   Transfers No deficits identified   Balance   Balance Comments defer to PT   Activities of Daily Living   BADL Assessment/Intervention lower body dressing;grooming   Lower Body Dressing Assessment/Training   Position (Lower Body Dressing) edge of bed sitting   [place] Level (Lower Body Dressing) independent   Grooming Assessment/Training   Position (Grooming) unsupported sitting   [place] Level (Grooming) independent   Clinical Impression   Criteria for Skilled Therapeutic Interventions Met (OT) Evaluation only   Clinical Decision Making Complexity (OT) low complexity   Risk & Benefits of therapy have been explained evaluation/treatment results reviewed;care plan/treatment goals reviewed;risks/benefits reviewed;current/potential barriers reviewed;participants voiced agreement with care plan;participants included;patient   OT Discharge Planning   OT Discharge Recommendation (DC Rec) home with assist   OT Rationale for DC Rec pt feeling much better after surgery. I with ADL's and functional transfers at this time. all word finding and difficulty with writing has gone away.  Pt states he stopped driving a month ago because he didn't feel comfortable. educated on OP OT for pre driving screen if he would like to get back to driving and wants it checked out. no other acute OT needs at this time.   Total Evaluation Time (Minutes)   Total Evaluation  Time (Minutes) 20

## 2022-06-29 NOTE — PLAN OF CARE
Physical Therapy Discharge Summary    Reason for therapy discharge:    All goals and outcomes met, no further needs identified.    Progress towards therapy goal(s). See goals on Care Plan in Epic electronic health record for goal details.  Goals met    Therapy recommendation(s):    No further therapy is recommended. Patient near baseline for functional mobility, able to ambulate long hallway distances without a device and scored a 24/24 on the DGI.     Humera Polanco, PT

## 2022-06-29 NOTE — PROGRESS NOTES
[facility]  Neurosurgery Daily Progress Note    Assessment & Plan   Procedure(s):  Left stealth craniotomy for tumor resection with motor mapping   -1 Day Post-Op  Patient seen in ICU. He is doing well and denies headache, dizziness, nausea, vision/speech changes. Incision CDI. Post op MRI completed.     MR BRAIN WITHOUT AND WITH CONTRAST 6/29/2022 9:52 AM  IMPRESSION:   1.  Left frontoparietal craniotomy for left frontal lobe mass  resection with expected postoperative changes including a small amount  of air in the anterior margin of the resection cavity and layering  blood products posteriorly in the resection cavity. While there is a  persistent rim enhancement at the margins of this site, it is less  thickened and less conspicuous than it had been previously. The  rim-enhancing focus also decreased in size, now measuring 2.1 x 2.2 cm  in AP and transverse dimensions, as compared to 2.9 x 2.4 cm  previously. Accompanying thin left-sided dural enhancement is  presumably postoperative in nature.  2.  Additional metastatic lesions redemonstrated elsewhere. While the  cystic rim-enhancing lesion in the right frontal lobe has decreased in  size, smaller metastatic lesions described above appear relatively  unchanged.  3.  No superimposed acute intracranial abnormality.    Plan:  - Okay to transfer to the floor   - Continue supportive and symptomatic treatment  - Decadron taper as ordered   - Continue Keppra   - Routine wound care   - Utilize pain control measures as needed   - PT/OT   - Appreciate assistance from other specialties     Discussed with Dr. Dean Joy, CNP  [facility]surgery  [address] [place] [address]  Tel [phone]  Pager [phone]    Interval History   Stable     Physical Exam   Temp: 98.5  F (36.9  C) Temp src: Oral BP: 130/70 Pulse: 54   Resp: 14 SpO2: 93 % O2 Device: None (Room air) Oxygen  Delivery: 2 LPM  Vitals:    06/28/22 1115   Weight: 91.6 kg (202 lb)     Vital Signs with Ranges  Temp:  [98  F (36.7  C)-98.7  F (37.1  C)] 98.5  F (36.9  C)  Pulse:  [] 54  Resp:  [9-25] 14  BP: (109-170)/() 130/70  MAP:  [84 mmHg-137 mmHg] 137 mmHg  Arterial Line BP: (109-188)/() 137/136  SpO2:  [92 %-98 %] 93 %  I/O last 3 completed shifts:  In: 1300 [I.V.:1300]  Out: 625 [Urine:625]    Mental status:  Alert and oriented x 3, speech is fluent.  Cranial nerves:  II-XII intact.   Motor:  Moves all extremities equally   Sensation:  Intact  Coordination:  Smooth finger to nose testing.   Negative pronator drift.    Incision: CDI     Medications        acetaminophen  975 mg Oral Q8H     alectinib  450 mg Oral BID w/meals     dexamethasone  4 mg Intravenous Q6H    Followed by     [START ON 6/30/2022] dexamethasone  4 mg Intravenous Q12H    Followed by     [START ON 7/2/2022] dexamethasone  4 mg Intravenous Q24H    Followed by     [START ON 7/6/2022] dexamethasone  2 mg Intravenous Q24H    Followed by     [START ON 7/10/2022] dexamethasone  1 mg Intravenous Q24H     hydrochlorothiazide  25 mg Oral Daily     insulin aspart   Subcutaneous TID w/meals     insulin aspart  1-10 Units Subcutaneous TID AC     insulin aspart  1-7 Units Subcutaneous At Bedtime     insulin glargine  20 Units Subcutaneous BID     levETIRAcetam  1,000 mg Oral BID     nystatin  200,000 Units Oral 4x Daily     polyethylene glycol  17 g Oral Daily     senna-docusate  1 tablet Oral BID     sodium chloride (PF)  3 mL Intracatheter Q8H       [facility]   [address]  [place] [address]  Tel [phone]  Pager [phone]

## 2022-06-30 ENCOUNTER — TELEPHONE (OUTPATIENT)
Dept: PHARMACY | Facility: CLINIC | Age: 42
End: 2022-06-30

## 2022-06-30 VITALS
SYSTOLIC BLOOD PRESSURE: 133 MMHG | BODY MASS INDEX: 29.92 KG/M2 | HEIGHT: 69 IN | WEIGHT: 202 LBS | DIASTOLIC BLOOD PRESSURE: 85 MMHG | HEART RATE: 63 BPM | OXYGEN SATURATION: 99 % | TEMPERATURE: 99.1 F | RESPIRATION RATE: 16 BRPM

## 2022-06-30 LAB
GLUCOSE BLDC GLUCOMTR-MCNC: 307 MG/DL (ref 70–99)
GLUCOSE BLDC GLUCOMTR-MCNC: 311 MG/DL (ref 70–99)

## 2022-06-30 PROCEDURE — 99233 SBSQ HOSP IP/OBS HIGH 50: CPT | Performed by: PHYSICIAN ASSISTANT

## 2022-06-30 PROCEDURE — 250N000013 HC RX MED GY IP 250 OP 250 PS 637: Performed by: PHYSICIAN ASSISTANT

## 2022-06-30 PROCEDURE — 250N000011 HC RX IP 250 OP 636: Performed by: NEUROLOGICAL SURGERY

## 2022-06-30 RX ORDER — DEXAMETHASONE 4 MG/1
4 TABLET ORAL DAILY
Qty: 2 TABLET | Refills: 0 | Status: SHIPPED | OUTPATIENT
Start: 2022-07-02 | End: 2022-07-07

## 2022-06-30 RX ORDER — METHOCARBAMOL 500 MG/1
500 TABLET, FILM COATED ORAL EVERY 6 HOURS PRN
Qty: 40 TABLET | Refills: 0 | Status: SHIPPED | OUTPATIENT
Start: 2022-06-30 | End: 2022-10-26

## 2022-06-30 RX ORDER — DEXAMETHASONE 2 MG/1
2 TABLET ORAL
Qty: 2 TABLET | Refills: 0 | Status: SHIPPED | OUTPATIENT
Start: 2022-07-04 | End: 2022-07-07

## 2022-06-30 RX ORDER — OXYCODONE HYDROCHLORIDE 5 MG/1
5 TABLET ORAL EVERY 4 HOURS PRN
Qty: 40 TABLET | Refills: 0 | Status: SHIPPED | OUTPATIENT
Start: 2022-06-30 | End: 2022-08-09

## 2022-06-30 RX ORDER — DEXAMETHASONE 1 MG
1 TABLET ORAL DAILY
Qty: 2 TABLET | Refills: 0 | Status: SHIPPED | OUTPATIENT
Start: 2022-07-06 | End: 2022-07-18

## 2022-06-30 RX ORDER — DEXAMETHASONE 2 MG/1
2 TABLET ORAL
Qty: 2 TABLET | Refills: 0 | Status: SHIPPED | OUTPATIENT
Start: 2022-06-30 | End: 2022-06-30

## 2022-06-30 RX ORDER — DEXAMETHASONE 4 MG/1
4 TABLET ORAL 2 TIMES DAILY
Qty: 4 TABLET | Refills: 0 | Status: SHIPPED | OUTPATIENT
Start: 2022-06-30 | End: 2022-07-07

## 2022-06-30 RX ADMIN — INSULIN ASPART 4 UNITS: 100 INJECTION, SOLUTION INTRAVENOUS; SUBCUTANEOUS at 08:46

## 2022-06-30 RX ADMIN — LEVETIRACETAM 1000 MG: 500 TABLET, FILM COATED ORAL at 07:47

## 2022-06-30 RX ADMIN — DEXAMETHASONE 4 MG: 4 TABLET ORAL at 08:45

## 2022-06-30 RX ADMIN — HYDROCHLOROTHIAZIDE 25 MG: 25 TABLET ORAL at 07:47

## 2022-06-30 RX ADMIN — INSULIN ASPART 7 UNITS: 100 INJECTION, SOLUTION INTRAVENOUS; SUBCUTANEOUS at 08:46

## 2022-06-30 RX ADMIN — ACETAMINOPHEN 975 MG: 325 TABLET ORAL at 01:30

## 2022-06-30 RX ADMIN — INSULIN ASPART 4 UNITS: 100 INJECTION, SOLUTION INTRAVENOUS; SUBCUTANEOUS at 12:38

## 2022-06-30 RX ADMIN — ACETAMINOPHEN 975 MG: 325 TABLET ORAL at 08:45

## 2022-06-30 RX ADMIN — INSULIN ASPART 7 UNITS: 100 INJECTION, SOLUTION INTRAVENOUS; SUBCUTANEOUS at 12:38

## 2022-06-30 ASSESSMENT — ACTIVITIES OF DAILY LIVING (ADL)
ADLS_ACUITY_SCORE: 22

## 2022-06-30 NOTE — DISCHARGE SUMMARY
[facility]    Discharge Summary   [facility] Neurosurgery    Date of Admission:  6/28/2022  Date of Discharge:  6/30/2022  Discharging Provider: Marva Coronel PA-C  Date of Service (when I saw the patient): 06/30/22    Discharge Diagnoses   Active Problems:    Surgery, elective      History of Present Illness   Connor Emerson is a 42-year-old male with a history of metastatic lung cancer, who previously underwent radiosurgery to multiple brain metastases.  His lesion in his left frontal lobe has increased in size with a significant increase in vasogenic cerebral edema with significant symptoms and brain compression, although he improved on steroids. Surgical intervention was recommended.     Hospital Course   Connor Emerson was admitted on 6/28/2022. The patient is a 42-year-old male with a history of metastatic lung cancer, who previously underwent radiosurgery to multiple brain metastases.  His lesion in his left frontal lobe has increased in size with a significant increase in vasogenic cerebral edema with significant symptoms and brain compression, although he improved on steroids.  He was brought to the operating room for resection of the lesion in order to provide both diagnostic certainty and help accelerate recovery from his symptomatic recovery.    On 06/28/22, patient underwent left Stealth craniotomy with intraoperative SSEP phase reversal and resection of brain tumor with microdissection with Dr Moran. No intraoperative complications. EBL 50 ml. Patient admitted for pain management, neurologic monitoring, wound care. Patient meeting all discharge on 06/30/222. Patient was cleared by Neurosurgery for discharge to home in stable condition.      - Therapies evaluated and recommend discharge to home  - Routine post op care plan  - Routine wound care and activity restrictions  - Pain medications prescribed at discharge  - Follow up with NSG clinic as scheduled     I have  discussed the following assessment and plan with Dr. Moran who is in agreement with initial plan and will follow up with any further recommendations.    Marva Coronel PA-C  [facility]  [address] [address]    Tel [phone]  Pager [phone]      Pending Results   These results will be followed up by Dr. Moran  Unresulted Labs Ordered in the Past 30 Days of this Admission     Date and Time Order Name Status Description    6/28/2022  2:09 PM Surgical Pathology Exam In process           Code Status   Full Code    Primary Care Physician   Radha Shetty    Physical Exam   Temp: 98.2  F (36.8  C) Temp src: Oral BP: 128/85 Pulse: 60   Resp: 18 SpO2: 98 % O2 Device: None (Room air)    Vitals:    06/28/22 1115   Weight: 202 lb (91.6 kg)     Vital Signs with Ranges  Temp:  [98.2  F (36.8  C)-99  F (37.2  C)] 98.2  F (36.8  C)  Pulse:  [54-92] 60  Resp:  [9-56] 18  BP: (120-135)/(69-85) 128/85  SpO2:  [93 %-98 %] 98 %  I/O last 3 completed shifts:  In: 780 [P.O.:780]  Out: -     Awake, alert, intact  Speech intact  II-XII grossly intact  Incision is c/d/i   Strength 5/5 throughout   Negative drift  Smooth finger to nose testing     Time Spent on this Encounter   I, Marva Coronel PA-C, personally saw the patient today and spent less than or equal to 30 minutes discharging this patient.    Discharge Disposition   Discharged to home  Condition at discharge: Stable    Consultations This Hospital Stay   PHYSICAL THERAPY ADULT IP CONSULT  OCCUPATIONAL THERAPY ADULT IP CONSULT  HOSPITALIST IP CONSULT    Discharge Orders      Medication Therapy Management Referral      Reason for your hospital stay    Left stealth craniotomy for tumor resection with motor mapping     Follow-up and recommended labs and tests     Your Neurosurgical follow up appointments have been scheduled. You may call [phone] to make, confirm or change your follow-up  Neurosurgery appointment dates and/or times.     Activity    Your activity upon discharge: Limit heavy lifting until follow up visit. Ok to walk as tolerated. No high impact activities until follow-up visit.     Wound care and dressings    Instructions to care for your wound at home: keep wound clean and dry, may get incision wet in shower but do not soak or scrub, ice to area for comfort.     Diet    Follow this diet upon discharge: Regular     Discharge Medications   Current Discharge Medication List      START taking these medications    Details   methocarbamol (ROBAXIN) 500 MG tablet Take 1 tablet (500 mg) by mouth every 6 hours as needed for muscle spasms  Qty: 40 tablet, Refills: 0    Associated Diagnoses: Brain metastasis (H)      !! oxyCODONE (ROXICODONE) 5 MG tablet Take 1 tablet (5 mg) by mouth every 4 hours as needed for moderate to severe pain  Qty: 40 tablet, Refills: 0    Associated Diagnoses: Brain metastasis (H)       !! - Potential duplicate medications found. Please discuss with provider.      CONTINUE these medications which have CHANGED    Details   !! dexamethasone (DECADRON) 1 MG tablet Take 1 tablet (1 mg) by mouth daily for 2 days  Qty: 2 tablet, Refills: 0    Associated Diagnoses: Brain metastasis (H)      !! dexamethasone (DECADRON) 2 MG tablet Take 1 tablet (2 mg) by mouth daily (with breakfast) for 2 days  Qty: 2 tablet, Refills: 0    Associated Diagnoses: Brain metastasis (H)      !! dexamethasone (DECADRON) 4 MG tablet Take 1 tablet (4 mg) by mouth 2 times daily for 2 days  Qty: 4 tablet, Refills: 0    Associated Diagnoses: Brain metastasis (H)      !! dexamethasone (DECADRON) 4 MG tablet Take 1 tablet (4 mg) by mouth daily for 2 days  Qty: 2 tablet, Refills: 0    Associated Diagnoses: Brain metastasis (H)       !! - Potential duplicate medications found. Please discuss with provider.      CONTINUE these medications which have NOT CHANGED    Details   alectinib (ALECENSA) 150 MG CAPS  Take 3 capsules (450 mg) by mouth 2 times daily (with meals)  Qty: 180 capsule, Refills: 11    Comments: Dose change  Associated Diagnoses: Malignant neoplasm of lower lobe of right lung (H)      citalopram (CELEXA) 20 MG tablet Take 1 tablet (20 mg) by mouth every evening  Qty: 90 tablet, Refills: 0    Associated Diagnoses: Anxiety      famotidine (PEPCID) 40 MG tablet Take 40 mg by mouth daily      hydrochlorothiazide (HYDRODIURIL) 25 MG tablet Take 1 tablet (25 mg) by mouth in the morning.  Qty: 30 tablet, Refills: 3    Associated Diagnoses: Primary hypertension      insulin glargine (LANTUS PEN) 100 UNIT/ML pen Inject 20 Units Subcutaneous 2 times daily  Qty: 15 mL, Refills: 0    Comments: If Lantus is not covered by insurance, may substitute Basaglar or Semglee or other insulin glargine product per insurance preference at same dose and frequency.    Associated Diagnoses: Type 2 diabetes mellitus with hyperglycemia, with long-term current use of insulin (H)      !! oxyCODONE (ROXICODONE) 5 MG tablet Take 1 tablet (5 mg) by mouth every 6 hours as needed for pain  Qty: 30 tablet, Refills: 0    Associated Diagnoses: Cancer associated pain      alcohol swab prep pads Use to swab area of injection/jabier as directed.  Qty: 100 each, Refills: 3    Associated Diagnoses: Type 2 diabetes mellitus with hyperglycemia, with long-term current use of insulin (H)      blood glucose (NO BRAND SPECIFIED) lancets standard Use to test blood sugar 1 times daily or as directed.  Qty: 100 each, Refills: 0    Associated Diagnoses: Type 2 diabetes mellitus with hyperglycemia, without long-term current use of insulin (H)      !! blood glucose (NO BRAND SPECIFIED) test strip Use to test blood sugar 2 times daily or as directed. To accompany: Blood Glucose Monitor Brands: per insurance.  Qty: 100 strip, Refills: 6    Associated Diagnoses: Type 2 diabetes mellitus with hyperglycemia, with long-term current use of insulin (H)      !! blood  glucose (NO BRAND SPECIFIED) test strip Use to test blood sugar 1 times daily or as directed.  Qty: 1 Box, Refills: 0    Associated Diagnoses: Type 2 diabetes mellitus with hyperglycemia, without long-term current use of insulin (H)      blood glucose calibration (NO BRAND SPECIFIED) solution To accompany: Blood Glucose Monitor Brands: per insurance.  Qty: 1 each, Refills: 0    Associated Diagnoses: Type 2 diabetes mellitus with hyperglycemia, with long-term current use of insulin (H)      !! blood glucose monitoring (NO BRAND SPECIFIED) meter device kit Use to test blood sugar 2 times daily or as directed. Preferred blood glucose meter OR supplies to accompany: Blood Glucose Monitor Brands: per insurance.  Qty: 1 kit, Refills: 0    Associated Diagnoses: Type 2 diabetes mellitus with hyperglycemia, with long-term current use of insulin (H)      !! blood glucose monitoring (NO BRAND SPECIFIED) meter device kit Use to test blood sugar 1 times daily or as directed.  Qty: 1 kit, Refills: 0    Associated Diagnoses: Type 2 diabetes mellitus with hyperglycemia, without long-term current use of insulin (H)      insulin pen needle (31G X 8 MM) 31G X 8 MM miscellaneous Use one pen needles daily or as directed.  Qty: 100 each, Refills: 0    Associated Diagnoses: Type 2 diabetes mellitus without complication (H)      levETIRAcetam (KEPPRA) 1000 MG tablet Take 1 tablet (1,000 mg) by mouth 2 times daily  Qty: 60 tablet, Refills: 1    Associated Diagnoses: Brain metastasis (H); Malignant neoplasm of lower lobe of right lung (H)      metoclopramide (REGLAN) 5 MG tablet Take 1 tablet (5 mg) by mouth 3 times daily as needed (hiccups)  Qty: 30 tablet, Refills: 1    Associated Diagnoses: Hiccups      nystatin (MYCOSTATIN) 963501 UNIT/ML suspension Take 2 mLs (200,000 Units) by mouth 4 times daily  Qty: 60 mL, Refills: 1    Associated Diagnoses: Thrush      prochlorperazine (COMPAZINE) 10 MG tablet Take 1 tablet (10 mg) by mouth every 6  hours as needed (Nausea/Vomiting)  Qty: 30 tablet, Refills: 2    Associated Diagnoses: Malignant neoplasm of lower lobe of right lung (H)      thin (NO BRAND SPECIFIED) lancets Use with lanceting device. To accompany: Blood Glucose Monitor Brands: per insurance.  Qty: 100 each, Refills: 6    Associated Diagnoses: Type 2 diabetes mellitus with hyperglycemia, with long-term current use of insulin (H)       !! - Potential duplicate medications found. Please discuss with provider.        Allergies   Allergies   Allergen Reactions     Vicodin [Hydrocodone-Acetaminophen] Nausea and Vomiting and GI Disturbance

## 2022-06-30 NOTE — PROGRESS NOTES
[facility]    Medicine Progress Note - Hospitalist Service    Date of Admission:  6/28/2022    Assessment & Plan    Connor Emerson is a 42 year old male admitted on 6/28/2022.     Past medical history significant for metastatic lung cancer with mets to the brain, vasogenic cerebral edema, insulin dependent DM2, overweight, HTN, GERD, MDD with anxiety, migraines, insomnia who underwent an elective left stealth craniotomy with intraop SSEP phase reversal and resection of brain tumor with microdissection.       Metastatic lung cancer with mets to the brain  Vasogenic cerebral edema  S/p left stealth craniotomy with intraop SSEP phase reversal and resection of brain tumor with microdissection (6/28/2022)  Follows with Oncology as an outpatient. Post-op brain MRI w/wo contrast completed 6/29; he was transitioned to the floor by neurosurgery.  - Neurosurgery is managing; cleared for discharge.               --Defer analgesic management, DVT prophylaxis, PT/OT.                 --Defer steroid management and resumption of PTA alectinib 450 mg BID and Keppra 1000 BID to surgery.  - PT/OT; cleared for discharge.     Insulin dependent DM2, uncontrolled  Steroid and stress induced hyperglycemia  A1c of 12.3% from 6/24/22. PTA medications include Lantus 20 units BID. Patient was taking Decadron 4 mg every morning PTA and now will be receiving IV Decadron taper. Initially BGs in high 200s to 300s following surgery. As patient transitioned from NPO to full diet, insulin was adjusted to 25 units BID with 1:8 units to carbs with food. Patient's blood sugars still noted in high 200s to 300s. Regimen adjusted for discharge as below. Discussed the need to add short-acting insulin to at-home regimen and patient was agreeable. Also discussed need for close follow-up given tapering of steroid regimen and changes made to insulin; the patient will get scheduled with his PCP.  - Recommend mod carb diet.  - Increase  Lantus to 30 units BID.  - Start Novolog 15 units with each meal + sliding scale at discharge   - Glucose checks before meals and at bedtime. Keep a log for PCP    - call clinic with any concerns/questions about BG. He has follow up next Tuesday     Overweight  Body mass index is 29.83 kg/m . Increase in all-cause morbidity and mortality.   - Encourage weight loss.  - Follow up with PCP regarding ongoing management.       HTN  - Goal SBP < 140.    - Cont PTA hydrochlorothiazide 25 mg every morning.      GERD  Patient is no longer taking PTA Pepcid 40 mg/d.    - Will discontinue Pepcid at this time.       MDD with anxiety  Discussed PTA Celexa medication with the patient and his wife. This medication was started the day prior to surgery and believed it was only for preparation leading up to the surgery. The patient does not want to continue this medication.    - Stop PTA Celexa and will need to address whether to continue at discharge or follow up with prescribing provider.       Migraines  Not currently on any medications.    - Defer analgesic management to Neurosurgery.       Insomnia  Not currently on any medications.       Diet: Moderate Consistent Carb (60 g CHO per Meal) Diet    DVT Prophylaxis: Pneumatic Compression Devices  Heart Catheter: Not present  Central Lines: None  Cardiac Monitoring: None  Code Status: Full Code      Disposition Plan       Expected Discharge Date: 06/30/2022      Destination: home with family          Ok to discharge from medicine standpoint       ALEJANDRO Potter  Hospitalist Service  [facility]  Securely message with the Vocera Web Console (learn more here)  Text page via [employer] Paging/Directory     I have independently evaluated the patient and agree with the information in this note.     This patient was discussed with Dr. Ordoñez who agrees with the above plan.    Mago Good PA-C      Clinically Significant Risk Factors Present on Admission         "       # Overweight: Estimated body mass index is 29.83 kg/m  as calculated from the following:    Height as of this encounter: 1.753 m (5' 9\").    Weight as of this encounter: 91.6 kg (202 lb).      ___________________________________________________________________    Interval History   The patient is afebrile with stable vital signs. He continues to recover well without noted neurological deficits. Cleared by neurosurgery for discharge. His blood sugars; however, remain high in the 300s. Discussed changes to insulin regimen in the setting of continued steroids. He denies any new or concerning symptoms including headaches, dizziness, lightheadedness, or confusion. Patient back to baseline per PT and OT.    Data reviewed today: I reviewed all medications, new labs and imaging results over the last 24 hours. I personally reviewed no images or EKG's today.    Physical Exam   Vital Signs: Temp: 98.2  F (36.8  C) Temp src: Oral BP: 128/85 Pulse: 60   Resp: 18 SpO2: 98 % O2 Device: None (Room air)    Weight: 202 lbs 0 oz     Constitutional: Resting comfortably. Well-appearing. No acute distress.  HEENT: Normocephalic. Craniotomy incision over left scalp; healing well without surrounding erythema or drainage. PERRL, EOM grossly intact.  Respiratory: Clear breath sounds. No increased work of breathing.  Cardiovascular: Regular rate and rhythm. No peripheral edema. Well-perfused.  Musculoskeletal: Moving all extremities.  Neurologic: Alert and oriented X3. CN II-XII grossly intact.   Skin: Warm and dry.     Data   Recent Labs   Lab 06/30/22  0736 06/29/22  2137 06/29/22  1723 06/28/22  1414 06/28/22  1109 06/27/22  0945   WBC  --   --   --   --  15.1* 17.0*   HGB  --   --   --   --  15.4 15.0   MCV  --   --   --   --  91 94   PLT  --   --   --   --  231 218   INR  --   --   --   --  0.90  --    NA  --   --   --   --   --  137   POTASSIUM  --   --   --   --  3.8 4.2   CHLORIDE  --   --   --   --   --  101   CO2  --   --   " --   --   --  29   BUN  --   --   --   --   --  14   CR  --   --   --   --   --  0.58*   ANIONGAP  --   --   --   --   --  7   TORY  --   --   --   --   --  8.6   * 269* 361*   < > 370* 451*    < > = values in this interval not displayed.

## 2022-06-30 NOTE — PLAN OF CARE
POD#2 L frontoparietal crani resection of brain tumor with microdissection by Dr. Moran, Hx of metastatic lung cancer with mets to the brain. Pt A&O x4. VSS, SBP<140, on RA. LS clear. CMS and Neuro's intact. Denies pain. Incision MCKENZIE, approximated, sutures intact. Showered. Voiding in bathroom. +BS. Decadron taper. Mod CHO diet,  elevated BG, sliding scale insulin and carb count insulin given per MAR. Pt scoring green on Aggression Stop Light Tool. Discharge home today, wife will be picking patient up at 1600, door #[phone].

## 2022-06-30 NOTE — PROGRESS NOTES
Cleared for discharge from NSGY standpoint   Will need clearance from hospitalist service    Marva Coronel PA-C  [facility] Neurosurgery  [address]  [address]  [place] [address]    Tel [phone]  Pager [phone]

## 2022-07-01 ENCOUNTER — TRANSFERRED RECORDS (OUTPATIENT)
Dept: HEALTH INFORMATION MANAGEMENT | Facility: CLINIC | Age: 42
End: 2022-07-01

## 2022-07-01 ENCOUNTER — PATIENT OUTREACH (OUTPATIENT)
Dept: CARE COORDINATION | Facility: CLINIC | Age: 42
End: 2022-07-01

## 2022-07-01 DIAGNOSIS — Z71.89 OTHER SPECIFIED COUNSELING: ICD-10-CM

## 2022-07-01 LAB — RETINOPATHY: NORMAL

## 2022-07-01 NOTE — PROGRESS NOTES
"Clinic Care Coordination Contact  [facility]: Post-Discharge Note  SITUATION                                                      Admission:    Admission Date: 06/28/22   Reason for Admission: Surgery, elective  Discharge:   Discharge Date: 06/30/22  Discharge Diagnosis: Surgery, elective    BACKGROUND                                                      Per hospital discharge summary and inpatient provider notes:Connor Emerson is a 42-year-old male with a history of metastatic lung cancer, who previously underwent radiosurgery to multiple brain metastases.  His lesion in his left frontal lobe has increased in size with a significant increase in vasogenic cerebral edema with significant symptoms and brain compression, although he improved on steroids. Surgical intervention was recommended.       ASSESSMENT           Discharge Assessment  How are you doing now that you are home?: \" I am doing ok \"  How are your symptoms? (Red Flag symptoms escalate to triage hotline per guidelines): Improved  Do you feel your condition is stable enough to be safe at home until your provider visit?: Yes  Does the patient have their discharge instructions? : Yes  Does the patient have questions regarding their discharge instructions? : No  Were you started on any new medications or were there changes to any of your previous medications? : Yes  Does the patient have all of their medications?: Yes  Do you have questions regarding any of your medications? : No  Do you have all of your needed medical supplies or equipment (DME)?  (i.e. oxygen tank, CPAP, cane, etc.): Yes  Discharge follow-up appointment scheduled within 14 calendar days? : Yes  Discharge Follow Up Appointment Date: 07/06/22  Discharge Follow Up Appointment Scheduled with?: Specialty Care Provider                  PLAN                                                      Outpatient Plan:Your Neurosurgical follow up appointments have been scheduled. You may call " [phone] to make, confirm or change  your follow-up Neurosurgery appointment dates and/or times.  Follow-up and recommended labs and tests  Follow up with primary care provider on Tuesday as planned. Keep a log of blood sugars until that time.     Future Appointments   Date Time Provider Department Center   7/5/2022  1:00 PM RSCCCT1 RHSCCT RS[facility]   7/5/2022  2:00 PM Ester Carl [facility]   7/6/2022  3:00 PM Bassam Persaud MD [facility]   7/12/2022 10:00 AM Nurse,  Spine/Brain [facility] RSCC   7/26/2022  2:00 PM Damaris Hawthorne, CNS [facility]   8/1/2022 10:00 AM RHMR1 [facility]   8/1/2022 11:20 AM Stephen Moran MD [facility] RSCC   8/3/2022  9:30 AM Esperanza Lovell APRN CNP [facility] EA   8/9/2022  1:00 PM Alex Oliveros PA-C [facility] RSCC   8/29/2022  1:30 PM Radha Shetty Ra, APRN CNP [facility]   10/3/2022  2:00 PM Stephen Moran MD R[facility]RS RS         For any urgent concerns, please contact our 24 hour nurse triage line: [phone] ([phone])         Juliana Wong

## 2022-07-05 ENCOUNTER — HOSPITAL ENCOUNTER (OUTPATIENT)
Dept: CT IMAGING | Facility: CLINIC | Age: 42
Discharge: HOME | End: 2022-07-05
Attending: STUDENT IN AN ORGANIZED HEALTH CARE EDUCATION/TRAINING PROGRAM | Admitting: STUDENT IN AN ORGANIZED HEALTH CARE EDUCATION/TRAINING PROGRAM
Payer: COMMERCIAL

## 2022-07-05 DIAGNOSIS — C34.31 MALIGNANT NEOPLASM OF LOWER LOBE OF RIGHT LUNG (H): ICD-10-CM

## 2022-07-05 LAB
PATH REPORT.COMMENTS IMP SPEC: NORMAL
PATH REPORT.FINAL DX SPEC: NORMAL
PATH REPORT.GROSS SPEC: NORMAL
PATH REPORT.MICROSCOPIC SPEC OTHER STN: NORMAL
PATH REPORT.RELEVANT HX SPEC: NORMAL
PHOTO IMAGE: NORMAL

## 2022-07-05 PROCEDURE — 250N000009 HC RX 250: Performed by: STUDENT IN AN ORGANIZED HEALTH CARE EDUCATION/TRAINING PROGRAM

## 2022-07-05 PROCEDURE — 88307 TISSUE EXAM BY PATHOLOGIST: CPT | Mod: 26 | Performed by: SPECIALIST

## 2022-07-05 PROCEDURE — 250N000011 HC RX IP 250 OP 636: Performed by: STUDENT IN AN ORGANIZED HEALTH CARE EDUCATION/TRAINING PROGRAM

## 2022-07-05 PROCEDURE — 74177 CT ABD & PELVIS W/CONTRAST: CPT

## 2022-07-05 PROCEDURE — 88342 IMHCHEM/IMCYTCHM 1ST ANTB: CPT | Mod: 26 | Performed by: SPECIALIST

## 2022-07-05 RX ORDER — IOPAMIDOL 755 MG/ML
500 INJECTION, SOLUTION INTRAVASCULAR ONCE
Status: COMPLETED | OUTPATIENT
Start: 2022-07-05 | End: 2022-07-05

## 2022-07-05 RX ADMIN — SODIUM CHLORIDE 65 ML: 9 INJECTION, SOLUTION INTRAVENOUS at 12:43

## 2022-07-05 RX ADMIN — IOPAMIDOL 90 ML: 755 INJECTION, SOLUTION INTRAVENOUS at 12:43

## 2022-07-06 NOTE — PROGRESS NOTES
Medication Therapy Management (MTM) Encounter    ASSESSMENT:                            Medication Adherence/Access: No issues identified    History of metastatic lung cancer, multiple brain metastases: continues to work with oncology and neurosurgery    Seizure:  None, he is not currently taking Keppra    Chronic Pain: Patient would benefit from pleural effusion drainage, he is expecting to set this up today to get this done.    Nausea:  Stable, no symptoms    Depression: recently started citalopram and tolerating, educated that it takes 4-6 weeks to see best effect.    Type 2 Diabetes: Patient is not meeting A1c goal of < 7%. Self monitoring of blood glucose is not at goal of fasting  mg/dL. Patient would benefit from ideal SMBG: recommend continous glucose monitor.  Ran out of time to fully discuss other diabetic options. Per [employer] [place] online formulary, some preferred options: Bydureon, Victoza, Jardiance, Tradjenta, pioglitazone, glipizide Recommend against metformin due to history of needing imaging for cancer. Due for annual foot exam. Due for annual eye exam.     Hypertension: Stable. Patient is not meeting blood pressure goal of < 130/80mmHg, but previous readings at goal and currently on dexamethasone    Hyperlipidemia: Patient is not on moderate intensity statin which is indicated based on 2019 ACC/AHA guidelines for lipid management.      GERD: Stable.    Vaccines: Per ACIP/CDC due for COVID-19, Prevnar-20, Tdap, Hepatitis B vaccines, we did not have time to discuss today    PLAN:                            Diabetes:   1.  Please start Freestyle Mohan 2 continous glucose monitor.  You can find videos on how to start this at [email or web address]  2. We sent you a reader device but you can also download an bravo on your phone to check blood sugars-FreeStyle Mohan 2 bravo  3.  Sent you an invite for us to see your blood sugars  4.  Be sure to check your blood sugars about every 8 hours or three  times a day so we can see your blood sugar trends    Future consideration: ask about statin and vaccines.  Diabetic options to discuss: Victoza/Bydureon, Jardiance    Follow-up: Return in 2 weeks (on 7/21/2022) for MTM Pharmacist Visit, phone visit.    SUBJECTIVE/OBJECTIVE:                          Connor Emerson is a 42 year old male called for a transitions of care visit. He was discharged from [facility] on 6/30/22 for elective surgery, resection of lesion in left frontal lobe, hisotry of multiple brain metastases.      He had in person visit today, called and he did not realize his visit was changed to today, we changed to telephone visit.    Reason for visit: diabetes.    Allergies/ADRs: Reviewed in chart  Past Medical History: Reviewed in chart  Tobacco: He reports that he has never smoked. He has never used smokeless tobacco.  Alcohol: rarely  Caffeine: yesterday mountain dew and diet coke or water  Activity: walk all day long,     Medication Adherence/Access:   Patient uses pill box(es).  His wife sets them up for him.  Per patient, misses medication 0 times per week.   Medication barriers: none.   The patient fills medications at [facility]: YES.    History of metastatic lung cancer, multiple brain metastases: Current medication is alectinib 450mg twice daily, dexamethasone 1mg every day for 2 days and then he is finished with this. Side effects: reporting mood changes-agitated  Following up with Neurosurgery Dr. Moran 8/1 and follows with Oncology Dr. Persaud     Seizure:  Current medication is none, he reports not taking levetiracetam/Keppra 1000mg twice daily.  He reports he was told he could take it but does necessarily need to    Chronic pain:  Current medications include: methocarbamol 500mg every 6 hours as needed (given for surgery, once), oxycodone 5mg every 6 hours as needed (reports taking 1 out of 40, not a big fan of taking pain medications). Not having any  "pain except for pain in right lung, pleural effusion in right lung, waiting for call from [facility] to get this scheduled for a drain. Reports no side effects    Nausea:  Current medication is prochlorperazine 10mg every 6 hours as needed, metoclopramide for hiccups (he is done with this).  No current symptoms.  Hiccups happened for 3 weeks while on chemotherapy and this is done    Depression:  Current medications include: Citalopram 20mg once daily, recently started per dispensing records, he is not aware that he is taking this. Patient reports that depression symptoms are worsened. No thoughts of harming himself, short with everyone.  PHQ-9 SCORE 4/3/2013 2020   PHQ-9 Total Score 12 -   PHQ-9 Total Score - 7     Type 2 Diabetes:  Currently taking Lantus 30 units once a day, Novolog 1-3 units with meals twice a day, Has a scale for Novolog, 1 unit per 50 over 150 mg/dL. Steroid are increasing blood sugars.  He is interested in other medications. Patient is not experiencing side effects.  Blood sugar monitorin time(s) daily. Ranges (patient reported): 300 mg/dL  Last night 301  Today 264  Ran out of time to discuss low and high blood sugars symptoms  Eye exam: due  Foot exam: due  Diet/Exercise: twice  Aspirin: No  Statin: No   ACEi/ARB: No.   Urine Albumin:   Lab Results   Component Value Date    UMALCR  2022      Comment:      Unable to calculate:  Urine creatinine or albumin value below detectable level      Lab Results   Component Value Date    A1C 12.3 2022    A1C 10.1 2022    A1C 11.9 2020    A1C 6.3 08/10/2018    A1C 6.5 2017    A1C 6.4 2017    A1C 6.5 2017       Wt Readings from Last 4 Encounters:   22 202 lb (91.6 kg)   22 203 lb 9.6 oz (92.4 kg)   22 203 lb 8 oz (92.3 kg)   22 203 lb 11.2 oz (92.4 kg)     Estimated body mass index is 29.83 kg/m  as calculated from the following:    Height as of 22: 5' 9\" (1.753 m).    Weight as " of 6/28/22: 202 lb (91.6 kg).    Hypertension: Current medications include hydrochlorothiazide 25mg every day.  Patient does self-monitor blood pressure. He does not know what it is.  Patient reports no current medication side effects.  BP Readings from Last 3 Encounters:   06/30/22 133/85   06/27/22 129/81   06/24/22 120/68     Hyperlipidemia: Current therapy includes no current medications.   The 10-year ASCVD risk score (Mic HOLBROOK Jr., et al., 2013) is: 6.7%    Values used to calculate the score:      Age: 42 years      Sex: Male      Is Non- : No      Diabetic: Yes      Tobacco smoker: No      Systolic Blood Pressure: 133 mmHg      Is BP treated: No      HDL Cholesterol: 33 mg/dL      Total Cholesterol: 208 mg/dL  Recent Labs   Lab Test 02/24/22  1224 08/10/18  1110 11/11/16  1051 09/29/15  1604 07/14/14  1437   CHOL 208* 182   < > 184 168   HDL 33* 36*   < > 33* 34*   * 121*   < > 110 113   TRIG 338* 126   < > 204* 105   CHOLHDLRATIO  --   --   --  5.6* 5.0    < > = values in this interval not displayed.       GERD: Current medications include: Pepcid (famotidine) 40mg once daily as needed. Patient reports no current symptoms.  Patient feels that current regimen is effective.    Most Recent Immunizations   Administered Date(s) Administered     HepA-Adult 04/10/2014     HepB-Adult 04/10/2014     Influenza (IIV3) PF 11/03/2010     Influenza Vaccine IM > 6 months Valent IIV4 (Alfuria,Fluzone) 10/06/2015     Pneumococcal 23 valent 10/06/2015     TDAP Vaccine (Adacel) 12/01/2011     Td (Adult), Adsorbed 12/27/2004     Today's Vitals: There were no vitals taken for this visit.  ----------------  Post Discharge Medication Reconciliation Status: discharge medications reconciled and changed, per note/orders.    I spent 25 minutes with this patient today. All changes were made via collaborative practice agreement with Radha Shetty, JERRY CNP. A copy of the visit note was provided to the  patient's provider(s).    The patient was sent via [employer] a summary of these recommendations.     Ester Carl, PharmD [facility]  Medication Therapy Management Practitioner   #[phone]    Telemedicine Visit Details  Type of service:  Telephone visit  Start Time: 8:09am  End Time: 8:34 AM  Originating Location (patient location): [place]  Distant Location (provider location):  [facility]     Medication Therapy Recommendations  Type 2 diabetes mellitus without complication (H)    Current Medication: insulin aspart (NOVOLOG PEN) 100 UNIT/ML pen   Rationale: Medication requires monitoring - Needs additional monitoring - Effectiveness   Recommendation: Self-Monitoring - FreeStyle Mohan 2 Sensor Misc   Status: Accepted per CPA

## 2022-07-07 ENCOUNTER — VIRTUAL VISIT (OUTPATIENT)
Dept: PHARMACY | Facility: CLINIC | Age: 42
End: 2022-07-07
Attending: NEUROLOGICAL SURGERY
Payer: COMMERCIAL

## 2022-07-07 DIAGNOSIS — K21.9 GERD (GASTROESOPHAGEAL REFLUX DISEASE): ICD-10-CM

## 2022-07-07 DIAGNOSIS — Z41.9 SURGERY, ELECTIVE: ICD-10-CM

## 2022-07-07 DIAGNOSIS — I10 HTN, GOAL BELOW 140/90: ICD-10-CM

## 2022-07-07 DIAGNOSIS — C79.31 BRAIN METASTASIS: ICD-10-CM

## 2022-07-07 DIAGNOSIS — J90 PLEURAL EFFUSION ON RIGHT: ICD-10-CM

## 2022-07-07 DIAGNOSIS — Z71.85 VACCINE COUNSELING: ICD-10-CM

## 2022-07-07 DIAGNOSIS — K21.00 GASTROESOPHAGEAL REFLUX DISEASE WITH ESOPHAGITIS WITHOUT HEMORRHAGE: ICD-10-CM

## 2022-07-07 DIAGNOSIS — E78.5 HYPERLIPIDEMIA LDL GOAL <100: ICD-10-CM

## 2022-07-07 DIAGNOSIS — C34.31 MALIGNANT NEOPLASM OF LOWER LOBE OF RIGHT LUNG (H): ICD-10-CM

## 2022-07-07 DIAGNOSIS — Z79.4 TYPE 2 DIABETES MELLITUS WITHOUT COMPLICATION, WITH LONG-TERM CURRENT USE OF INSULIN (H): Primary | ICD-10-CM

## 2022-07-07 DIAGNOSIS — E11.9 TYPE 2 DIABETES MELLITUS WITHOUT COMPLICATION, WITH LONG-TERM CURRENT USE OF INSULIN (H): Primary | ICD-10-CM

## 2022-07-07 DIAGNOSIS — F43.23 ADJUSTMENT DISORDER WITH MIXED ANXIETY AND DEPRESSED MOOD: ICD-10-CM

## 2022-07-07 DIAGNOSIS — R11.0 NAUSEA: ICD-10-CM

## 2022-07-07 PROCEDURE — 99607 MTMS BY PHARM ADDL 15 MIN: CPT | Performed by: PHARMACIST

## 2022-07-07 PROCEDURE — 99605 MTMS BY PHARM NP 15 MIN: CPT | Performed by: PHARMACIST

## 2022-07-07 NOTE — PATIENT INSTRUCTIONS
Recommendations from today's MTM visit:                                                    MTM (medication therapy management) is a service provided by a clinical pharmacist designed to help you get the most of out of your medicines.     Diabetes:   1.  Please start Freestyle Mohan 2 continous glucose monitor.  You can find videos on how to start this at [email or web address]  2. We sent you a reader device but you can also download an bravo on your phone to check blood sugars-FreeStyle Mohan 2 bravo  3.  Sent you an invite for us to see your blood sugars  4.  Be sure to check your blood sugars about every 8 hours or three times a day so we can see your blood sugar trends    Follow-up: Return in 2 weeks (on 7/21/2022) for MTM Pharmacist Visit, phone visit.    To schedule another MTM appointment, please call the clinic directly or you may call the MTM scheduling line at [phone] or toll-free at [phone].     My Clinical Pharmacist's contact information:                                                      It was a pleasure seeing you today!  Please feel free to contact me with any questions or concerns you have.      Ester Carl, [facility]D [facility]  Medication Therapy Management Practitioner   #[phone]    It was great to speak with you today.  I value your experience and would be very thankful for your time with providing feedback on our clinic survey. You may receive a survey via email or text message in the next few days.

## 2022-07-12 ENCOUNTER — MYC MEDICAL ADVICE (OUTPATIENT)
Dept: NEUROSURGERY | Facility: CLINIC | Age: 42
End: 2022-07-12

## 2022-07-12 ENCOUNTER — TELEPHONE (OUTPATIENT)
Dept: NEUROSURGERY | Facility: CLINIC | Age: 42
End: 2022-07-12

## 2022-07-12 NOTE — TELEPHONE ENCOUNTER
Attempted to reach out to patient, no answer. Unable to leave message, mailbox full. Patient missed suture removal visit today at 10:00 AM.  Sent patient a message on [employer] to call our clinic back.

## 2022-07-12 NOTE — TELEPHONE ENCOUNTER
Patient responded to [employer] message stating he slept through his appointment. Provided scheduling number to call and re-schedule 2 week RN appointment. Advised patient he should be seen this week for suture removal.

## 2022-07-13 ENCOUNTER — PATIENT OUTREACH (OUTPATIENT)
Dept: ONCOLOGY | Facility: CLINIC | Age: 42
End: 2022-07-13

## 2022-07-13 NOTE — PROGRESS NOTES
Attempted to reach Pt regarding missed appt. No answer VM full.    11:13 AM  Reached Pt, Is ok just had a day of fatigue and sleeping. Pt has appt wit Dr Moran today Re: having issue with Right hand shaking in sporadic episodes, has sudden onset and resolution.   OK for scheduling to reach out and reschedule Pt, he was going to call us today for this reason.

## 2022-07-14 ENCOUNTER — OFFICE VISIT (OUTPATIENT)
Dept: NEUROSURGERY | Facility: CLINIC | Age: 42
End: 2022-07-14
Attending: NEUROLOGICAL SURGERY
Payer: COMMERCIAL

## 2022-07-14 VITALS
BODY MASS INDEX: 29.92 KG/M2 | WEIGHT: 202 LBS | OXYGEN SATURATION: 97 % | HEIGHT: 69 IN | HEART RATE: 84 BPM | DIASTOLIC BLOOD PRESSURE: 90 MMHG | SYSTOLIC BLOOD PRESSURE: 138 MMHG

## 2022-07-14 DIAGNOSIS — Z98.890 STATUS POST CRANIOTOMY: Primary | ICD-10-CM

## 2022-07-14 PROCEDURE — 99207 PR NO CHARGE NURSE ONLY: CPT

## 2022-07-14 ASSESSMENT — PAIN SCALES - GENERAL: PAINLEVEL: NO PAIN (0)

## 2022-07-14 NOTE — PROGRESS NOTES
Patient reports new intermittent right hand tremors once steroid taper completed.   Reported mistakenly only taking 1 tab of the Keppra daily instead of 2 tabs, as it is prescribed.   As per Garett REYES, start taking Keppra as prescribed and notify clinic if tremors continue/do not improve.  Patient understands.

## 2022-07-14 NOTE — PROGRESS NOTES
"Connor Emerson is a 42 year old male who presents for:  Chief Complaint   Patient presents with     status post surgery        Initial Vitals:  BP (!) 138/90   Pulse 84   Ht 5' 9\" (1.753 m)   Wt 202 lb (91.6 kg)   SpO2 97%   BMI 29.83 kg/m   Estimated body mass index is 29.83 kg/m  as calculated from the following:    Height as of this encounter: 5' 9\" (1.753 m).    Weight as of this encounter: 202 lb (91.6 kg).. Body surface area is 2.11 meters squared. BP completed using cuff size: large  No Pain (0)    Nursing Comments:     Ana Maria Arreguin MA    "

## 2022-07-14 NOTE — LETTER
7/14/2022         RE: Connor Emerson  [address]  [place] [address]        Dear Colleague,    Thank you for referring your patient, Connor Emerson, to the [facility] [place]. Please see a copy of my visit note below.    Nurse Suture/Staple removal visit note:    Post-op Nurse Visit:    Patient presents today s/p Left Stealth craniotomy with intraoperative SSEP phase reversal and resection of brain tumor with microdissection on 6/28 per the order of Stephen Moran MD .     Pain  0/10 .     Pain Relief Measures:  Oxycodone: none  Tylenol: none  Robaxin: none  Ice: none  Weaned from steroid?: Yes  Weaned from anti-seizure med?: No    Neuro Assessment  Denies new onset of weakness, acute changes in the level of consciousness (increased confusion, memory loss, speech abnormalities), any change in hearing or vision, increased headaches, or new onset of seizures.    Symptoms compared to pre-op: Improved. Right hand tremors are new.   Equal strength to bilateral upper extremities  Neurologically intact     Incision   Incision inspected. Edges well-approximated. No redness, swelling, drainage, warmth, or fever noted. suture(s) removed without difficulty.           Patient reports new intermittent right hand tremors once steroid taper completed.   Reported mistakenly only taking 1 tab of the Keppra daily instead of 2 tabs, as it is prescribed.   As per Garett REYES, start taking Keppra as prescribed and notify clinic if tremors continue/do not improve.  Patient understands.       Again, thank you for allowing me to participate in the care of your patient.        Sincerely,         Spine/Brain Nurse

## 2022-07-14 NOTE — PROGRESS NOTES
Nurse Suture/Staple removal visit note:    Post-op Nurse Visit:    Patient presents today s/p Left Stealth craniotomy with intraoperative SSEP phase reversal and resection of brain tumor with microdissection on 6/28 per the order of Stephen Moran MD .     Pain  0/10 .     Pain Relief Measures:  Oxycodone: none  Tylenol: none  Robaxin: none  Ice: none  Weaned from steroid?: Yes  Weaned from anti-seizure med?: No    Neuro Assessment  Denies new onset of weakness, acute changes in the level of consciousness (increased confusion, memory loss, speech abnormalities), any change in hearing or vision, increased headaches, or new onset of seizures.    Symptoms compared to pre-op: Improved. Right hand tremors are new.   Equal strength to bilateral upper extremities  Neurologically intact     Incision   Incision inspected. Edges well-approximated. No redness, swelling, drainage, warmth, or fever noted. suture(s) removed without difficulty.

## 2022-07-14 NOTE — PATIENT INSTRUCTIONS
Instructions for Patient  Keep your incision clean and dry at all times.  No bathing, swimming, or submerging in water until incision is well healed.    Post operative activity limitations: 4 weeks, -No bending forward, no lifting anything greater than 10 pounds  Return in 2/4 weeks for follow up appointment and CT head/ Brain MRI prior  Weaning from narcotic pain medications: When it is time, start weaning by extending the time between doses. For example, if you're taking 2 tabs every 4 hours, spread it out to 2 tabs over 4.5, 5, 6 hours. At that point you can certainly cut down to 1 tab, then wean to an as needed basis until completely done with them.   For refills, please call our clinic. A nurse will call you back to obtain a pain assessment. Please call 3-4 business days before you run out so we can ensure there is a provider available at the location you prefer for .  Call the clinic or go to Emergency Room if you develop any new pain, drainage, swelling, or fever. Go to the Emergency Room if sudden onset of Acute changes in the level of consciousness (increased confusion, memory loss, speech abnormalities) ,Any change in hearing or vision , increased headaches, or New onset of seizures.  Please call clinic with any further questions or concerns    ZAHIDA Rollins  [facility]   [address] [address]  T:  [phone]  F:  [phone]

## 2022-07-18 ENCOUNTER — HOSPITAL ENCOUNTER (OUTPATIENT)
Facility: CLINIC | Age: 42
Setting detail: OBSERVATION
Discharge: HOME | End: 2022-07-19
Attending: EMERGENCY MEDICINE | Admitting: INTERNAL MEDICINE
Payer: COMMERCIAL

## 2022-07-18 ENCOUNTER — APPOINTMENT (OUTPATIENT)
Dept: GENERAL RADIOLOGY | Facility: CLINIC | Age: 42
End: 2022-07-18
Attending: EMERGENCY MEDICINE
Payer: COMMERCIAL

## 2022-07-18 ENCOUNTER — APPOINTMENT (OUTPATIENT)
Dept: CT IMAGING | Facility: CLINIC | Age: 42
End: 2022-07-18
Attending: EMERGENCY MEDICINE
Payer: COMMERCIAL

## 2022-07-18 ENCOUNTER — TELEPHONE (OUTPATIENT)
Dept: NEUROSURGERY | Facility: CLINIC | Age: 42
End: 2022-07-18

## 2022-07-18 DIAGNOSIS — C79.31 BRAIN METASTASIS: ICD-10-CM

## 2022-07-18 DIAGNOSIS — R73.9 HYPERGLYCEMIA: ICD-10-CM

## 2022-07-18 DIAGNOSIS — Z09 HOSPITAL DISCHARGE FOLLOW-UP: Primary | ICD-10-CM

## 2022-07-18 DIAGNOSIS — E11.65 TYPE 2 DIABETES MELLITUS WITH HYPERGLYCEMIA, WITH LONG-TERM CURRENT USE OF INSULIN (H): ICD-10-CM

## 2022-07-18 DIAGNOSIS — R56.9 SEIZURE (H): ICD-10-CM

## 2022-07-18 DIAGNOSIS — Z79.4 TYPE 2 DIABETES MELLITUS WITH HYPERGLYCEMIA, WITH LONG-TERM CURRENT USE OF INSULIN (H): ICD-10-CM

## 2022-07-18 DIAGNOSIS — C34.31 MALIGNANT NEOPLASM OF LOWER LOBE OF RIGHT LUNG (H): ICD-10-CM

## 2022-07-18 LAB
ALBUMIN SERPL-MCNC: 3.5 G/DL (ref 3.4–5)
ALP SERPL-CCNC: 164 U/L (ref 40–150)
ALT SERPL W P-5'-P-CCNC: 21 U/L (ref 0–70)
ANION GAP SERPL CALCULATED.3IONS-SCNC: 10 MMOL/L (ref 3–14)
AST SERPL W P-5'-P-CCNC: 7 U/L (ref 0–45)
ATRIAL RATE - MUSE: 117 BPM
BASOPHILS # BLD AUTO: 0 10E3/UL (ref 0–0.2)
BASOPHILS NFR BLD AUTO: 0 %
BILIRUB DIRECT SERPL-MCNC: 0.2 MG/DL (ref 0–0.2)
BILIRUB SERPL-MCNC: 0.7 MG/DL (ref 0.2–1.3)
BUN SERPL-MCNC: 13 MG/DL (ref 7–30)
CALCIUM SERPL-MCNC: 8.7 MG/DL (ref 8.5–10.1)
CHLORIDE BLD-SCNC: 102 MMOL/L (ref 94–109)
CO2 SERPL-SCNC: 27 MMOL/L (ref 20–32)
CREAT SERPL-MCNC: 0.66 MG/DL (ref 0.66–1.25)
DIASTOLIC BLOOD PRESSURE - MUSE: NORMAL MMHG
EOSINOPHIL # BLD AUTO: 0.3 10E3/UL (ref 0–0.7)
EOSINOPHIL NFR BLD AUTO: 3 %
ERYTHROCYTE [DISTWIDTH] IN BLOOD BY AUTOMATED COUNT: 14.5 % (ref 10–15)
ETHANOL SERPL-MCNC: <0.01 G/DL
GFR SERPL CREATININE-BSD FRML MDRD: >90 ML/MIN/1.73M2
GLUCOSE BLD-MCNC: 621 MG/DL (ref 70–99)
GLUCOSE BLDC GLUCOMTR-MCNC: 262 MG/DL (ref 70–99)
GLUCOSE BLDC GLUCOMTR-MCNC: 297 MG/DL (ref 70–99)
GLUCOSE BLDC GLUCOMTR-MCNC: 300 MG/DL (ref 70–99)
GLUCOSE BLDC GLUCOMTR-MCNC: 318 MG/DL (ref 70–99)
GLUCOSE BLDC GLUCOMTR-MCNC: 339 MG/DL (ref 70–99)
GLUCOSE BLDC GLUCOMTR-MCNC: 385 MG/DL (ref 70–99)
GLUCOSE BLDC GLUCOMTR-MCNC: 504 MG/DL (ref 70–99)
GLUCOSE BLDC GLUCOMTR-MCNC: 586 MG/DL (ref 70–99)
HCT VFR BLD AUTO: 37.6 % (ref 40–53)
HGB BLD-MCNC: 12.7 G/DL (ref 13.3–17.7)
HOLD SPECIMEN: NORMAL
IMM GRANULOCYTES # BLD: 0.1 10E3/UL
IMM GRANULOCYTES NFR BLD: 1 %
INTERPRETATION ECG - MUSE: NORMAL
KETONES BLD-SCNC: 0.1 MMOL/L (ref 0–0.6)
LYMPHOCYTES # BLD AUTO: 4.2 10E3/UL (ref 0.8–5.3)
LYMPHOCYTES NFR BLD AUTO: 43 %
Lab: NORMAL
MCH RBC QN AUTO: 31.7 PG (ref 26.5–33)
MCHC RBC AUTO-ENTMCNC: 33.8 G/DL (ref 31.5–36.5)
MCV RBC AUTO: 94 FL (ref 78–100)
MONOCYTES # BLD AUTO: 0.7 10E3/UL (ref 0–1.3)
MONOCYTES NFR BLD AUTO: 7 %
NEUTROPHILS # BLD AUTO: 4.6 10E3/UL (ref 1.6–8.3)
NEUTROPHILS NFR BLD AUTO: 46 %
NRBC # BLD AUTO: 0 10E3/UL
NRBC BLD AUTO-RTO: 0 /100
P AXIS - MUSE: 38 DEGREES
PERFORMING LABORATORY: NORMAL
PLATELET # BLD AUTO: 247 10E3/UL (ref 150–450)
POTASSIUM BLD-SCNC: 3 MMOL/L (ref 3.4–5.3)
PR INTERVAL - MUSE: 150 MS
PROT SERPL-MCNC: 6.4 G/DL (ref 6.8–8.8)
QRS DURATION - MUSE: 92 MS
QT - MUSE: 346 MS
QTC - MUSE: 482 MS
R AXIS - MUSE: 28 DEGREES
RBC # BLD AUTO: 4.01 10E6/UL (ref 4.4–5.9)
SARS-COV-2 RNA RESP QL NAA+PROBE: NEGATIVE
SODIUM SERPL-SCNC: 139 MMOL/L (ref 133–144)
SPECIMEN STATUS: NORMAL
SYSTOLIC BLOOD PRESSURE - MUSE: NORMAL MMHG
T AXIS - MUSE: 32 DEGREES
TEST NAME: NORMAL
VENTRICULAR RATE- MUSE: 117 BPM
WBC # BLD AUTO: 9.8 10E3/UL (ref 4–11)

## 2022-07-18 PROCEDURE — 250N000013 HC RX MED GY IP 250 OP 250 PS 637: Performed by: INTERNAL MEDICINE

## 2022-07-18 PROCEDURE — 99207 PR NO CHARGE LOS: CPT | Performed by: INTERNAL MEDICINE

## 2022-07-18 PROCEDURE — 250N000011 HC RX IP 250 OP 636

## 2022-07-18 PROCEDURE — 250N000012 HC RX MED GY IP 250 OP 636 PS 637: Performed by: EMERGENCY MEDICINE

## 2022-07-18 PROCEDURE — 258N000003 HC RX IP 258 OP 636: Performed by: INTERNAL MEDICINE

## 2022-07-18 PROCEDURE — 250N000011 HC RX IP 250 OP 636: Performed by: EMERGENCY MEDICINE

## 2022-07-18 PROCEDURE — 71046 X-RAY EXAM CHEST 2 VIEWS: CPT

## 2022-07-18 PROCEDURE — 99285 EMERGENCY DEPT VISIT HI MDM: CPT | Mod: 25

## 2022-07-18 PROCEDURE — 36415 COLL VENOUS BLD VENIPUNCTURE: CPT | Performed by: EMERGENCY MEDICINE

## 2022-07-18 PROCEDURE — G0378 HOSPITAL OBSERVATION PER HR: HCPCS

## 2022-07-18 PROCEDURE — 80177 DRUG SCRN QUAN LEVETIRACETAM: CPT | Performed by: INTERNAL MEDICINE

## 2022-07-18 PROCEDURE — 82010 KETONE BODYS QUAN: CPT | Performed by: EMERGENCY MEDICINE

## 2022-07-18 PROCEDURE — 85025 COMPLETE CBC W/AUTO DIFF WBC: CPT | Performed by: EMERGENCY MEDICINE

## 2022-07-18 PROCEDURE — 70450 CT HEAD/BRAIN W/O DYE: CPT

## 2022-07-18 PROCEDURE — 96372 THER/PROPH/DIAG INJ SC/IM: CPT | Mod: XS

## 2022-07-18 PROCEDURE — G0463 HOSPITAL OUTPT CLINIC VISIT: HCPCS | Performed by: PHYSICIAN ASSISTANT

## 2022-07-18 PROCEDURE — 84999 UNLISTED CHEMISTRY PROCEDURE: CPT | Performed by: INTERNAL MEDICINE

## 2022-07-18 PROCEDURE — 82077 ASSAY SPEC XCP UR&BREATH IA: CPT | Performed by: EMERGENCY MEDICINE

## 2022-07-18 PROCEDURE — U0003 INFECTIOUS AGENT DETECTION BY NUCLEIC ACID (DNA OR RNA); SEVERE ACUTE RESPIRATORY SYNDROME CORONAVIRUS 2 (SARS-COV-2) (CORONAVIRUS DISEASE [COVID-19]), AMPLIFIED PROBE TECHNIQUE, MAKING USE OF HIGH THROUGHPUT TECHNOLOGIES AS DESCRIBED BY CMS-2020-01-R: HCPCS | Performed by: EMERGENCY MEDICINE

## 2022-07-18 PROCEDURE — 258N000003 HC RX IP 258 OP 636: Performed by: EMERGENCY MEDICINE

## 2022-07-18 PROCEDURE — 96361 HYDRATE IV INFUSION ADD-ON: CPT

## 2022-07-18 PROCEDURE — 96372 THER/PROPH/DIAG INJ SC/IM: CPT | Performed by: EMERGENCY MEDICINE

## 2022-07-18 PROCEDURE — 250N000011 HC RX IP 250 OP 636: Performed by: PHYSICIAN ASSISTANT

## 2022-07-18 PROCEDURE — 82310 ASSAY OF CALCIUM: CPT | Performed by: EMERGENCY MEDICINE

## 2022-07-18 PROCEDURE — 96375 TX/PRO/DX INJ NEW DRUG ADDON: CPT

## 2022-07-18 PROCEDURE — 250N000013 HC RX MED GY IP 250 OP 250 PS 637: Performed by: EMERGENCY MEDICINE

## 2022-07-18 PROCEDURE — C9803 HOPD COVID-19 SPEC COLLECT: HCPCS

## 2022-07-18 PROCEDURE — 93005 ELECTROCARDIOGRAM TRACING: CPT

## 2022-07-18 PROCEDURE — 82248 BILIRUBIN DIRECT: CPT | Performed by: EMERGENCY MEDICINE

## 2022-07-18 PROCEDURE — 99223 1ST HOSP IP/OBS HIGH 75: CPT | Mod: AI | Performed by: INTERNAL MEDICINE

## 2022-07-18 PROCEDURE — 96374 THER/PROPH/DIAG INJ IV PUSH: CPT

## 2022-07-18 PROCEDURE — 120N000001 HC R&B MED SURG/OB

## 2022-07-18 RX ORDER — LIDOCAINE 40 MG/G
CREAM TOPICAL
Status: DISCONTINUED | OUTPATIENT
Start: 2022-07-18 | End: 2022-07-19 | Stop reason: HOSPADM

## 2022-07-18 RX ORDER — ONDANSETRON 4 MG/1
4 TABLET, ORALLY DISINTEGRATING ORAL EVERY 6 HOURS PRN
Status: DISCONTINUED | OUTPATIENT
Start: 2022-07-18 | End: 2022-07-19 | Stop reason: HOSPADM

## 2022-07-18 RX ORDER — MAGNESIUM HYDROXIDE/ALUMINUM HYDROXICE/SIMETHICONE 120; 1200; 1200 MG/30ML; MG/30ML; MG/30ML
30 SUSPENSION ORAL EVERY 4 HOURS PRN
Status: DISCONTINUED | OUTPATIENT
Start: 2022-07-18 | End: 2022-07-19 | Stop reason: HOSPADM

## 2022-07-18 RX ORDER — LEVETIRACETAM 500 MG/1
1000 TABLET ORAL 2 TIMES DAILY
Status: DISCONTINUED | OUTPATIENT
Start: 2022-07-18 | End: 2022-07-19 | Stop reason: HOSPADM

## 2022-07-18 RX ORDER — ONDANSETRON 2 MG/ML
4 INJECTION INTRAMUSCULAR; INTRAVENOUS EVERY 6 HOURS PRN
Status: DISCONTINUED | OUTPATIENT
Start: 2022-07-18 | End: 2022-07-19 | Stop reason: HOSPADM

## 2022-07-18 RX ORDER — ACETAMINOPHEN 650 MG/1
650 SUPPOSITORY RECTAL EVERY 6 HOURS PRN
Status: DISCONTINUED | OUTPATIENT
Start: 2022-07-18 | End: 2022-07-19 | Stop reason: HOSPADM

## 2022-07-18 RX ORDER — DEXTROSE MONOHYDRATE 25 G/50ML
25-50 INJECTION, SOLUTION INTRAVENOUS
Status: DISCONTINUED | OUTPATIENT
Start: 2022-07-18 | End: 2022-07-19 | Stop reason: HOSPADM

## 2022-07-18 RX ORDER — POTASSIUM CHLORIDE 1.5 G/1.58G
40 POWDER, FOR SOLUTION ORAL ONCE
Status: COMPLETED | OUTPATIENT
Start: 2022-07-18 | End: 2022-07-18

## 2022-07-18 RX ORDER — DEXAMETHASONE 2 MG/1
2 TABLET ORAL DAILY
Status: DISCONTINUED | OUTPATIENT
Start: 2022-07-18 | End: 2022-07-19 | Stop reason: HOSPADM

## 2022-07-18 RX ORDER — LORAZEPAM 2 MG/ML
1 INJECTION INTRAMUSCULAR EVERY 4 HOURS PRN
Status: DISCONTINUED | OUTPATIENT
Start: 2022-07-18 | End: 2022-07-19 | Stop reason: HOSPADM

## 2022-07-18 RX ORDER — ACETAMINOPHEN 325 MG/1
650 TABLET ORAL EVERY 6 HOURS PRN
Status: DISCONTINUED | OUTPATIENT
Start: 2022-07-18 | End: 2022-07-19 | Stop reason: HOSPADM

## 2022-07-18 RX ORDER — LANOLIN ALCOHOL/MO/W.PET/CERES
3 CREAM (GRAM) TOPICAL
Status: DISCONTINUED | OUTPATIENT
Start: 2022-07-18 | End: 2022-07-19 | Stop reason: HOSPADM

## 2022-07-18 RX ORDER — NICOTINE POLACRILEX 4 MG
15-30 LOZENGE BUCCAL
Status: DISCONTINUED | OUTPATIENT
Start: 2022-07-18 | End: 2022-07-19 | Stop reason: HOSPADM

## 2022-07-18 RX ORDER — LORAZEPAM 2 MG/ML
INJECTION INTRAMUSCULAR
Status: COMPLETED
Start: 2022-07-18 | End: 2022-07-18

## 2022-07-18 RX ADMIN — LEVETIRACETAM 2000 MG: 100 INJECTION, SOLUTION INTRAVENOUS at 02:26

## 2022-07-18 RX ADMIN — LEVETIRACETAM 1000 MG: 500 TABLET, FILM COATED ORAL at 07:58

## 2022-07-18 RX ADMIN — SODIUM CHLORIDE 1000 ML: 9 INJECTION, SOLUTION INTRAVENOUS at 02:02

## 2022-07-18 RX ADMIN — INSULIN ASPART 10 UNITS: 100 INJECTION, SOLUTION INTRAVENOUS; SUBCUTANEOUS at 03:12

## 2022-07-18 RX ADMIN — SODIUM CHLORIDE 1000 ML: 9 INJECTION, SOLUTION INTRAVENOUS at 05:49

## 2022-07-18 RX ADMIN — INSULIN GLARGINE 30 UNITS: 100 INJECTION, SOLUTION SUBCUTANEOUS at 04:19

## 2022-07-18 RX ADMIN — LORAZEPAM 1 MG: 2 INJECTION INTRAMUSCULAR at 01:54

## 2022-07-18 RX ADMIN — LEVETIRACETAM 1000 MG: 500 TABLET, FILM COATED ORAL at 19:49

## 2022-07-18 RX ADMIN — DEXAMETHASONE 2 MG: 2 TABLET ORAL at 09:30

## 2022-07-18 RX ADMIN — POTASSIUM CHLORIDE 40 MEQ: 1.5 POWDER, FOR SOLUTION ORAL at 03:11

## 2022-07-18 ASSESSMENT — ACTIVITIES OF DAILY LIVING (ADL)
VISION_MANAGEMENT: CONTACT
ADLS_ACUITY_SCORE: 22
ADLS_ACUITY_SCORE: 35
DOING_ERRANDS_INDEPENDENTLY_DIFFICULTY: YES
DIFFICULTY_EATING/SWALLOWING: NO
NUMBER_OF_TIMES_PATIENT_HAS_FALLEN_WITHIN_LAST_SIX_MONTHS: 1
FALL_HISTORY_WITHIN_LAST_SIX_MONTHS: YES
WALKING_OR_CLIMBING_STAIRS_DIFFICULTY: NO
ADLS_ACUITY_SCORE: 35
CONCENTRATING,_REMEMBERING_OR_MAKING_DECISIONS_DIFFICULTY: NO
ADLS_ACUITY_SCORE: 22
WEAR_GLASSES_OR_BLIND: YES
ADLS_ACUITY_SCORE: 22
DRESSING/BATHING_DIFFICULTY: NO
ADLS_ACUITY_SCORE: 35
TOILETING_ISSUES: NO
ADLS_ACUITY_SCORE: 22
ADLS_ACUITY_SCORE: 22

## 2022-07-18 ASSESSMENT — ENCOUNTER SYMPTOMS
FEVER: 0
SEIZURES: 1
NUMBNESS: 0
WEAKNESS: 0

## 2022-07-18 NOTE — ED TRIAGE NOTES
Pt arrives via EMS from home after clonic tonic seizure. Patient has history of lung and brain cancer. Had brain surgery 6/28. Has been having right arm seizure/tremors since surgery. Tonight had full clonic tonic surgery. Had witness right arm seizure by EMS. Blood sugar HIGH for EMS. Left 18 G IV, 100 mL NS given by EMS.     Patient arrives alert and oriented. Denies headache.    Dr. Roberts called to bedside for seizure prevention.    Seizure pads in place.      Triage Assessment     Row Name 07/18/22 0158       Triage Assessment (Adult)    Airway WDL WDL       Respiratory WDL    Respiratory WDL WDL       Skin Circulation/Temperature WDL    Skin Circulation/Temperature WDL WDL       Cardiac WDL    Cardiac WDL WDL       Peripheral/Neurovascular WDL    Peripheral Neurovascular WDL WDL       Cognitive/Neuro/Behavioral WDL    Cognitive/Neuro/Behavioral WDL X

## 2022-07-18 NOTE — H&P
"History and Physical     Connor Emerson MRN# [id number]   YOB: 1980 Age: 42 year old      Date of Admission:  7/18/2022    Primary care provider: Radha Shetty Ra          Assessment and Plan:     Summary of Stay: Connor Emerson is a 42 year old male with a history of htn/T2dm, migraines, metastatic lung cancer with mets to the brain s/p craniotomy and tumor removal on 6/28/2022 admitted on 7/18/2022 with seizures.     On review of his chart - he was admitted to the [facility]  In mid June with some speech issues, although not formally concluded seems as if they were thinking the sx were mediated by seizures as he was started on leviteracetam at that time.  He, nor his wife recall this and state they never picked up this medication. He was then hospitalized at [facility] for his brain surgery and this was just \"continued\" at discharge- I suspect that if it hadn't been part of his usual list they would have at least started him on a post op course.     Yesterday he was feeling just off.  He can't really describe it but knew things were not right and messaged his neurosurgeon via my chart later that evening.  He then proceeded to have a succession of 3 generalized tonic clonic seizures in a row.  Not totally sure how long it lasted.  No one was home so these were unwitnessed.  He was conscious for most of them but still with some amnesia as to what happened.  He did not bite his tongue or have urinary incontinence.  He states over the past week that he's been having episodic shaking episodes of his right hand -sudden onset and resolution.     He has been feeling fatigued as well but denies any new fevers or other infectious symptoms.     In the ER VS moderately htnsive but otherwise VSS, he's afebrile.  Labs mostly notable for severe hyperglycemia at 621    I am asked to admit him for seizures and severe hyperglycemia     Problem List:   Seizures He certainly seems high risk for seizures with metastatic cancer and " recent craniotomy. Not a drinker.  Presentation not completely consistent but I'd err on the side of these being real.  ? If has been having partial seizures that are progressing?  He was given 2 grams IV levetiracetam in the ER   -cont levetiracetam at 1 gm bid and will have prn lorazepam available  -sz precautions  -Neuro consultation for appropriate choice of medications, determination of further investigation (eeg)    Hyperglycemia   BG typically run in the 300 range.  Clearly needs better control.  Didn't take his glargine yesterday as was not feeling right but otherwise has been compliant with that and with his aspart that he takes 1 U for every 50 > 175 bid.   -glargine 30 will be continued  -given 15 unit(s) aspart in the ER and see where he shakes out.  Likely needs a combination of increased glargine and carb counting control vs just a straight up dose of aspart with meals   -I don't think he needs an insulin gtt although we might need it if unable to get his BG into the 200 range over the next 6 hours or so    htn await med rec, BPs have settled out      Metastatic lung cancer   Not sure if he's on any agents for that at the moment  Marva Coronel from [facility] did add in low dose dex for what looks like some radiation necrosis on CT scan     COVID 19 pending  No documentation of vaccination in chart    DVT Prophylaxis: Pneumatic Compression Devices  Code Status: Full Code  Functional Status: independent  Heart: not needed  Access:  PIV             Chief Complaint:     Seizures        History of Present Illness:   Connor Emerson is a 42 year old male with a history of htn/T2dm, migraines, metastatic lung cancer with mets to the brain s/p craniotomy and tumor removal on 6/28/2022 admitted on 7/18/2022 with seizures.     On review of his chart - [facility]  [facility] with some speech issues, although not formally concluded seems as if they were thinking the sx were mediated by seizures as he was  "started on leviteracetam at that time.  He, nor his wife recall this and state they never picked up this medication. He was then hospitalized at [facility] for his brain surgery and this was just \"continued\" at discharge- I suspect that if it hadn't been part of his usual list they would have at least started him on a post op course.     Yesterday he was feeling just off.  He can't really describe it but knew things were not right and messaged his neurosurgeon via my chart later that evening.  He then proceeded to have a succession of 3 generalized tonic clonic seizures in a row.  Not totally sure how long it lasted.  No one was home so these were unwitnessed.  He was conscious for most of them but still with some amnesia as to what happened.  He did not bite his tongue or have urinary incontinence.  He states over the past week that he's been having episodic shaking episodes of his right hand -sudden onset and resolution.     He has been feeling fatigued as well but denies any new fevers or other infectious symptoms.     In the ER VS moderately htnsive but otherwise VSS, he's afebrile.  Labs mostly notable for severe hyperglycemia at 621    I am asked to admit him for seizures and severe hyperglycemia       The history is obtained in discussion with the ER provider Dr Roberts and the patient and his wife with good reliability      Epic and Care everywhere were extensively reviewed        Past Medical History:     Past Medical History:   Diagnosis Date     Atypical chest pain 12/02/2013     Cancer (H)      Complication of anesthesia      Diabetes (H)      GERD (gastroesophageal reflux disease) 12/02/2013     HTN (hypertension) 05/14/2012     HTN, goal below 140/90 07/02/2013     Insomnia 02/21/2012     Mediastinal lymphadenopathy      Migraine headache 07/02/2013     Migraine with aura, without mention of intractable migraine without mention of status migrainosus      Pneumonia              Past Surgical History:     Past " Surgical History:   Procedure Laterality Date     BRONCHOSCOPY RIGID OR FLEXIBLE W/TRANSENDOSCOPIC ENDOBRONCHIAL ULTRASOUND GUIDED Bilateral 1/26/2022    Procedure: Right BRONCHOSCOPY, FIBEROPTIC, endobronchial ultrasound, pleural biopsy;  Surgeon: Dallin Agrawal MD;  Location: [facility] OR     INJECT BLOCK MEDIAL BRANCH CERVICAL/THORACIC/LUMBAR       INSERT CHEST TUBE Right 2/16/2022    Procedure: INSERTION, CATHETER, INTERCOSTAL, FOR DRAINAGE;  Surgeon: Dallin Agrawal MD;  Location: [facility] GI     INSERT CHEST TUBE Right 3/9/2022    Procedure: INSERTION, CATHETER, INTERCOSTAL, FOR DRAINAGE;  Surgeon: Sushila Antonio MD;  Location: [facility] GI     IR CHEST TUBE REMOVAL TUNNELED RIGHT  4/2/2022     OPTICAL TRACKING SYSTEM CRANIOTOMY, EXCISE TUMOR, COMBINED Left 6/28/2022    Procedure: Left stealth craniotomy for tumor resection with motor mapping;  Surgeon: Stephen Moran MD;  Location:  OR     ORTHOPEDIC SURGERY      Ganesh. Rotator cuff repair.     PLEUROSCOPY N/A 1/26/2022    Procedure: Pleuroscopy with Pleural Biopsy;  Surgeon: Dallin Agrawal MD;  Location: [facility] OR             Social History:     Social History     Tobacco Use     Smoking status: Never Smoker     Smokeless tobacco: Never Used   Substance Use Topics     Alcohol use: Yes     Alcohol/week: 0.0 standard drinks     Comment: social             Family History:     Family History   Problem Relation Age of Onset     Unknown/Adopted Mother      Uterine Cancer Mother      Unknown/Adopted Father      Unknown/Adopted Maternal Grandmother      Unknown/Adopted Maternal Grandfather      Stomach Cancer Maternal Grandfather      Unknown/Adopted Paternal Grandmother      Unknown/Adopted Paternal Grandfather      Melanoma Maternal Uncle             Allergies:     Allergies   Allergen Reactions     Vicodin [Hydrocodone-Acetaminophen] Nausea and Vomiting and GI Disturbance             Medications:     Await med rec          Review of Systems:     A Comprehensive greater than  10 system review of systems was carried out.  Pertinent positives and negatives are noted above.  Otherwise negative for contributory information.           Physical Exam:   Blood pressure 138/87, pulse 96, temperature 98.4  F (36.9  C), temperature source Oral, resp. rate 20, SpO2 93 %.  Exam:    General:  Pleasant nad looks stated age  HEENT:  Head nc/at sclera clear PERR O/P:  Mask in place  Neck is supple  Lungs: cta b nl effort   CV:  RRR no m/r/g no le edema  Abd:  S/nt/nd no r/g  Neuro:  Cn 2-12 grossly intact and thorne   Alert and oriented affect appropriate   Skin:  W/d no c/c               Data:          Lab Results   Component Value Date     07/18/2022     05/12/2020    Lab Results   Component Value Date    CHLORIDE 102 07/18/2022    CHLORIDE 98 05/12/2020    Lab Results   Component Value Date    BUN 13 07/18/2022    BUN 20 05/12/2020      Lab Results   Component Value Date    POTASSIUM 3.0 07/18/2022    POTASSIUM 3.9 05/12/2020    Lab Results   Component Value Date    CO2 27 07/18/2022    CO2 28 05/12/2020    Lab Results   Component Value Date    CR 0.66 07/18/2022    CR 0.66 05/12/2020        Lab Results   Component Value Date    WBC 9.8 07/18/2022    HGB 12.7 (L) 07/18/2022    HCT 37.6 (L) 07/18/2022    MCV 94 07/18/2022     07/18/2022     Lab Results   Component Value Date     (HH) 07/18/2022     Lab Results   Component Value Date    AST 7 07/18/2022    ALT 21 07/18/2022    ALKPHOS 164 (H) 07/18/2022    BILITOTAL 0.7 07/18/2022            Imaging:     Recent Results (from the past 24 hour(s))   Head CT w/o contrast    Narrative    EXAM: CT HEAD W/O CONTRAST  LOCATION: [facility]  DATE/TIME: 7/18/2022 2:47 AM    INDICATION: Recent radiotherapy for known brain malignancy (metastatic lesion) with new seizure today.  COMPARISON: Brain MRI 06/29/2022.  TECHNIQUE: Routine CT Head without IV contrast. Multiplanar reformats. Dose reduction techniques were  used.    FINDINGS:  INTRACRANIAL CONTENTS: Vasogenic edema left frontal lobe is grossly stable compared to the prior study. Previously seen underlying lesion on the brain MRI is much better appreciated on that study. Cystic area in the right orbital frontal region is   grossly stable. No definite new lesion or hemorrhage. Small focus of low-attenuation in the left cerebellar hemisphere with metastatic lesions seen on MRI. Other lesions are not well evaluated on CT. No CT evidence of acute infarct.    VISUALIZED ORBITS/SINUSES/MASTOIDS: No intraorbital abnormality. No paranasal sinus mucosal disease. No middle ear or mastoid effusion.    BONES/SOFT TISSUES: Previous left frontal craniotomy.      Impression    IMPRESSION:  1.  Vasogenic edema deep to the left frontal craniotomy is grossly stable. Underlying lesion is much better appreciated on previous MRI.    2.  Scattered additional lesions that are visualized are grossly unchanged. Multiple lesions seen on the previous MRI are not visible on this CT.    3.  No hemorrhage or recent infarct by CT.   Chest XR,  PA & LAT    Narrative    EXAM: XR CHEST 2 VW  LOCATION: [facility]  DATE/TIME: 7/18/2022 3:00 AM    INDICATION: Seizure. Cough.  COMPARISON: 6/1/2022.    FINDINGS: The heart size is normal. Mild atelectasis or scarring at the lung bases. The lungs are otherwise clear. No pneumothorax or pleural effusion.      Impression    IMPRESSION: Mild bibasilar atelectasis or scar.

## 2022-07-18 NOTE — PHARMACY-ADMISSION MEDICATION HISTORY
Admission medication history interview status for this patient is complete. See [facility] admission navigator for allergy information, prior to admission medications and immunization status.     Medication history interview done, indicate source(s): Patient  Medication history resources (including written lists, pill bottles, clinic record): [employer] dispense records  Pharmacy: [id number] [place], [place] - [address] AT [place] [address] [phone]      Changes made to PTA medication list:  Reported as Not Taking: Levetiracetam, Methocarbamol, Oxycodone, Prochlorperazine    Actions taken by pharmacist (provider contacted, etc):None     Additional medication history information:None    Medication reconciliation/reorder completed by provider prior to medication history?  Yes     For patients on insulin therapy:   Do you use sliding scale insulin based on blood sugars? Yes - 1 unit per 50 over 175mg/dL  What is your pre-meal insulin coverage? None / Correctional scale  Do you typically eat three meals a day? Not assessed  How many times do you check your blood glucose per day?   How many episodes of hypoglycemia do you typically have per month? Not assessed  Do you have a Continuous Glucose Monitor (CGM)?  Yes      Prior to Admission medications    Medication Sig Last Dose Taking? Auth Provider Long Term End Date   alectinib (ALECENSA) 150 MG CAPS Take 3 capsules (450 mg) by mouth 2 times daily (with meals) 7/17/2022 at PM Yes Bassam Persaud MD     blood glucose (NO BRAND SPECIFIED) test strip Use to test blood sugar 2 times daily or as directed. To accompany: Blood Glucose Monitor Brands: per insurance.  Yes Radha Shetty Ra, APRN CNP     blood glucose calibration (NO BRAND SPECIFIED) solution To accompany: Blood Glucose Monitor Brands: per insurance.  Yes Radha Shetty Ra, APRN CNP     blood glucose monitoring (NO BRAND SPECIFIED) meter device kit Use to test blood  sugar 2 times daily or as directed. Preferred blood glucose meter OR supplies to accompany: Blood Glucose Monitor Brands: per insurance.  Yes Radha Shetty Ra, APRN CNP     citalopram (CELEXA) 20 MG tablet Take 1 tablet (20 mg) by mouth every evening Past Week Yes Radha Shetty Ra, APRN CNP Yes    Continuous Blood Gluc Sensor (FREESTYLE IRENE 2 SENSOR) MISC 1 each every 14 days Use 1 sensor every 14 days. Use to read blood sugars per 's instructions.  Yes Radha Shetty Ra, APRN CNP     hydrochlorothiazide (HYDRODIURIL) 25 MG tablet Take 1 tablet (25 mg) by mouth in the morning. 7/17/2022 at AM Yes Bassam Persaud MD Yes    insulin aspart (NOVOLOG PEN) 100 UNIT/ML pen Inject 1-10 Units Subcutaneous 3 times daily (before meals) Past Week at Unknown time Yes Mago Good PA-C Yes    insulin glargine (LANTUS PEN) 100 UNIT/ML pen Inject 30 Units Subcutaneous daily Past Week at Unknown time Yes Mago Good PA-C Yes    insulin pen needle (31G X 8 MM) 31G X 8 MM miscellaneous Use one pen needles daily or as directed.  Yes Jayden King PA-C     alcohol swab prep pads Use to swab area of injection/jabier as directed.   Radha Shetty Ra, APRN CNP     levETIRAcetam (KEPPRA) 1000 MG tablet Take 1 tablet (1,000 mg) by mouth 2 times daily  Patient not taking: Reported on 7/7/2022   Joss Rouse MD Yes    methocarbamol (ROBAXIN) 500 MG tablet Take 1 tablet (500 mg) by mouth every 6 hours as needed for muscle spasms  Patient not taking: Reported on 7/7/2022   Marva Coronel PA-C     oxyCODONE (ROXICODONE) 5 MG tablet Take 1 tablet (5 mg) by mouth every 4 hours as needed for moderate to severe pain  Patient not taking: Reported on 7/7/2022   Marva Coronel PA-C     prochlorperazine (COMPAZINE) 10 MG tablet Take 1 tablet (10 mg) by mouth every 6 hours as needed (Nausea/Vomiting)  Patient not taking: Reported on 7/7/2022   Bassam Persaud MD     thin (NO BRAND  SPECIFIED) lancets Use with lanceting device. To accompany: Blood Glucose Monitor Brands: per insurance.   Radha Shetty Ra, APRN CNP

## 2022-07-18 NOTE — PROGRESS NOTES
Yazmin from Dr. Borrego in the ED     Patient  is s/p Left Stealth craniotomy with intraoperative SSEP phase reversal and resection of brain tumor with microdissection on 6/28/22 with Stephen Moran MD . Incision check 7/14 noted he was doing well with concerns of intermittnet right hand tremor- he was noted to not be taking Keppra correctly and was updated by our team on correct dosage. Presents to [facility] with seizure activity- EDMD notes he has NOT been taking Keppra or insulin and blood glucose 586. Seizures under control now, intact on exam.    Head CT obtained and stable when compared to prior with stable vasogenic edema deep to left frontal craniotomy. Plan- admission, seizure management per Neurology, will start low dose steroids to assist with radiation necrosis- 2mg decadron daily orders placed. Dr Moran reviewed clinical history, imaging and plans and is in agreement.      Dr. Borrego in agreement     Marva Coronel PA-C  [facility]  [address]  [address] [place] [id number]    Tel [phone]

## 2022-07-18 NOTE — PROGRESS NOTES
Followed up on patient admitted by my colleague this AM.  He was feeling much better this afternoon.  No ongoing acute neuro complaints.  He is on steroids and keppra.  Will continue for now.  Exam stable.  Appreciate neurosurgery input.  Await neurology consult.  For DM, he has baseline poor control and now steroid use.  I have increased evening lantus to 15 units PM dose in addition to 30 units this AM.  I also added carb count insulin with meals.

## 2022-07-18 NOTE — PLAN OF CARE
PRIMARY DIAGNOSIS: Seizure   [id number]  OUTPATIENT/OBSERVATION GOALS TO BE MET BEFORE DISCHARGE:  1. ADLs back to baseline: No    2. Activity and level of assistance: Up with standby assistance.    3. Pain status: Pain free.    4. Return to near baseline physical activity: No     Discharge Planner Nurse   Safe discharge environment identified: No  Barriers to discharge: Yes       Entered by: Marli Simon RN 07/18/2022    Please review provider order for any additional goals.   Nurse to notify provider when observation goals have been met and patient is ready for discharge.      Patient alert and oriented. On room air. Denies pain. On combination diet. Declined breakfast. Blood sugar checks, carb count. Iv infusing. PRN ativan available. On seizure precautions.

## 2022-07-18 NOTE — CONSULTS
"HOSPITAL NEUROLOGY    History of the Present Illness:      42 year old male with metastatic lung cancer presented to the ED 7/18/2022 with seizures.  He underwent radiosurgery in late June to address multiple brain metastases.  The notes from that time suggest he was to continue levetiracetam.  He states he was not aware of this however and was not taking any seizure medication.      He has never had a seizure before.  He began having involuntary tremors of his right arm starting about a week ago.  This involved his right arm involuntarily \"shooting up\" and he would have to physically pull it back down with his left hand.  This goes on for about 10 minutes and there is no associated alterations in consciousness.  Over the last week, he felt \"something doesn't feel right.  No good vibes.\"  Last night, he woke up to get a drink of water.  He began having his typical right arm tremors.  But this time, the right arm, \"shot so far in the air that it took me with it, I was rolling around in glass, rolling around in frying pans.\"  No alterations in consciousness, he remembers this entire event well.  His legs (both legs) seemed to be moving involuntary as well, \"I don't know if it was just the fact that I was flailing on the ground.\"  This went on for about 10 minutes in his estimation.  Afterwards, the right arm was limp but after about 15 minutes, he could move it again.  No incontinence or tongue biting.      No other weakness, numbness, vision changes    Review of systems:  Constitutional: As mentioned above.   Eyes: No blurred vision, eye congestion, ptosis, miosis, or diplopia  ENT: No hearing loss or tinnitus, no rhinorrhea  Cardiovascular: No chest pain or palpitation  Respiratory: No shortness of breath  Gastrointestinal: No abdominal pain, diarrhea or constipation  Genitourinary: No burning on micturition   Musculoskeletal: No muscle ache, neck pain, back pain, arthritis  Skin: +bruising / purplish discoloration " left upper arm (also abdomen, but this likely is from the area of his diabetes injections)  Neurological: As mentioned above.  Psychiatric:anixety No  depression   All other systems negative or as noted in the history of the present illness.     Past Medical History:   Diagnosis Date     Atypical chest pain 12/02/2013     Cancer (H)      Complication of anesthesia      Diabetes (H)      GERD (gastroesophageal reflux disease) 12/02/2013     HTN (hypertension) 05/14/2012     HTN, goal below 140/90 07/02/2013     Insomnia 02/21/2012     Mediastinal lymphadenopathy      Migraine headache 07/02/2013     Migraine with aura, without mention of intractable migraine without mention of status migrainosus      Pneumonia         Past Surgical History:   Procedure Laterality Date     BRONCHOSCOPY RIGID OR FLEXIBLE W/TRANSENDOSCOPIC ENDOBRONCHIAL ULTRASOUND GUIDED Bilateral 1/26/2022    Procedure: Right BRONCHOSCOPY, FIBEROPTIC, endobronchial ultrasound, pleural biopsy;  Surgeon: Dallin Agrawal MD;  Location: [facility] OR     INJECT BLOCK MEDIAL BRANCH CERVICAL/THORACIC/LUMBAR       INSERT CHEST TUBE Right 2/16/2022    Procedure: INSERTION, CATHETER, INTERCOSTAL, FOR DRAINAGE;  Surgeon: Dallin Agrawal MD;  Location: [facility] GI     INSERT CHEST TUBE Right 3/9/2022    Procedure: INSERTION, CATHETER, INTERCOSTAL, FOR DRAINAGE;  Surgeon: Sushila Antonio MD;  Location: [facility] GI     IR CHEST TUBE REMOVAL TUNNELED RIGHT  4/2/2022     OPTICAL TRACKING SYSTEM CRANIOTOMY, EXCISE TUMOR, COMBINED Left 6/28/2022    Procedure: Left stealth craniotomy for tumor resection with motor mapping;  Surgeon: Stephen Moran MD;  Location:  OR     ORTHOPEDIC SURGERY      Ganesh. Rotator cuff repair.     PLEUROSCOPY N/A 1/26/2022    Procedure: Pleuroscopy with Pleural Biopsy;  Surgeon: Dallin Agrawal MD;  Location: [facility] OR        Social History     Tobacco Use     Smoking status: Never Smoker     Smokeless tobacco: Never Used   Vaping Use     Vaping Use: Never  used   Substance Use Topics     Alcohol use: Yes     Alcohol/week: 0.0 standard drinks     Comment: social     Drug use: No        Family History   Problem Relation Age of Onset     Unknown/Adopted Mother      Uterine Cancer Mother      Unknown/Adopted Father      Unknown/Adopted Maternal Grandmother      Unknown/Adopted Maternal Grandfather      Stomach Cancer Maternal Grandfather      Unknown/Adopted Paternal Grandmother      Unknown/Adopted Paternal Grandfather      Melanoma Maternal Uncle       No known family history of neurologic illness    Current Facility-Administered Medications:      0.9% sodium chloride BOLUS, 1,000 mL, Intravenous, Once, Mliey Castelan MD, Last Rate: 100 mL/hr at 07/18/22 1157, Rate Verify at 07/18/22 1157     acetaminophen (TYLENOL) tablet 650 mg, 650 mg, Oral, Q6H PRN **OR** acetaminophen (TYLENOL) Suppository 650 mg, 650 mg, Rectal, Q6H PRN, Miley Castelan MD     alum & mag hydroxide-simethicone (MAALOX) suspension 30 mL, 30 mL, Oral, Q4H PRN, Miley Castelan MD     dexamethasone (DECADRON) tablet 2 mg, 2 mg, Oral, Daily, Marva Coronel PA-C, 2 mg at 07/18/22 0930     glucose gel 15-30 g, 15-30 g, Oral, Q15 Min PRN **OR** dextrose 50 % injection 25-50 mL, 25-50 mL, Intravenous, Q15 Min PRN **OR** glucagon injection 1 mg, 1 mg, Subcutaneous, Q15 Min PRN, Miley Castelan MD     insulin aspart (NovoLOG) injection (RAPID ACTING), 1-7 Units, Subcutaneous, TID ACAnnelise Karen T, MD, 4 Units at 07/18/22 1330     insulin aspart (NovoLOG) injection (RAPID ACTING), 1-5 Units, Subcutaneous, At Bedtime, Miley Castelan MD     insulin aspart (NovoLOG) injection (RAPID ACTING), , Subcutaneous, TID AC, Saman Schaefer DO     insulin glargine (LANTUS PEN) injection 15 Units, 15 Units, Subcutaneous, At Bedtime, Saman Schaefer DO     [START ON 7/19/2022] insulin glargine (LANTUS PEN) injection 30 Units, 30 Units, Subcutaneous, QAM ALO Saman Schaefer,      levETIRAcetam (KEPPRA) tablet 1,000 mg,  "1,000 mg, Oral, BID, Miley Castelan MD, 1,000 mg at 07/18/22 0758     lidocaine (LMX4) cream, , Topical, Q1H PRN, Miley Castelan MD     lidocaine 1 % 0.1-1 mL, 0.1-1 mL, Other, Q1H PRN, Miley Castelan MD     LORazepam (ATIVAN) injection 1 mg, 1 mg, Intravenous, Q4H PRN, Miley Castelan MD     melatonin tablet 3 mg, 3 mg, Oral, At Bedtime PRN, Miley Castelan MD     ondansetron (ZOFRAN ODT) ODT tab 4 mg, 4 mg, Oral, Q6H PRN **OR** ondansetron (ZOFRAN) injection 4 mg, 4 mg, Intravenous, Q6H PRN, Miley Castelan MD     sodium chloride (PF) 0.9% PF flush 3 mL, 3 mL, Intracatheter, Q8H, Miley Castelan MD, 3 mL at 07/18/22 0549     sodium chloride (PF) 0.9% PF flush 3 mL, 3 mL, Intracatheter, q1 min prn, Miley Castelan MD    Allergies:  Allergies   Allergen Reactions     Vicodin [Hydrocodone-Acetaminophen] Nausea and Vomiting and GI Disturbance       Physical Examination:     BP (!) 158/96 (BP Location: Right arm)   Pulse 79   Temp 98.2  F (36.8  C) (Oral)   Resp 20   Wt 94.4 kg (208 lb 1.6 oz)   SpO2 96%   BMI 30.73 kg/m      Body mass index is 30.73 kg/m .    GEN: Alert. NAD. Normal affect. Cooperative.   HEENT: Normocephalic / atraumatic, no disc edema noted on fundoscopic exam  NECK/BACK: No meningismus.   CV: RRR. No carotid bruits.   EXT: No cyanosis. No edema. No erythema.   SKIN: +Area of purplish discoloration / bruising left upper arm      NEUROLOGICAL:   MENTAL STATUS:   Alert and oriented x 3 (-1 does not know date, knows month and year, states \"in general I don't know the date\"). Thought process and content unremarkable. Follows commands appropriately. Speech fluent.     CN:   II: Visual fields intact. PERRLA.   III, IV, VI: EOMI.    V: Symmetric facial sensation to light touch.   VII: Face symmetric.   VIII: Hearing symmetric. No nystagmus.   IX, X: Symmetric palatal rise.   XI: Symmetric shoulder shrug.   XII: Tongue midline with symmetric movements.     MOTOR:   Normal tone. Normal bulk. No tremor.      "    RIGHT UE: LEFT UE   Deltoid              5/5             5/5   Biceps              5/5              5/5   Triceps             5/5              5/5   Wrist Ext           5/5             5/5  Finger abd        5/5             5/5             RIGHT LE: LEFT LE:   HF     5/5            5/5   PF                      5/5            5/5   DF                     5/5             5/5     REFLEXES:       RIGHT:                LEFT:   Biceps        2/4                      2/4   Brachioradialis      2/4                      2/4   Patellar               2-3/4                      2/4   Achilles              2-3/4                      2/4    Plantar response flexor b/l. No clonus.     SENSATION:   Light touch intact and symmetric.     CEREBELLAR:   Normal F-N-F. Normal H-S. No dysmetria. No nystagmus.     GAIT:   deferred       Review of Diagnostics:  I personally reviewed and interpreted the followin/18/22 02:03   Sodium 139   Potassium 3.0 (L)   Chloride 102   Carbon Dioxide 27   Urea Nitrogen 13   Creatinine 0.66   GFR Estimate >90   Calcium 8.7   Anion Gap 10   Albumin 3.5   Protein Total 6.4 (L)   Alkaline Phosphatase 164 (H)   ALT 21   AST 7   Bilirubin Direct 0.2   Bilirubin Total 0.7     Vitamin B12:   Lab Results   Component Value Date    B12 364 2012         Complete Blood Count:    Recent Labs   Lab Test 22  0203 22  1109 22  0945 22  0848   WBC 9.8 15.1* 17.0* 14.1*   RBC 4.01* 4.91 4.79 4.74   HGB 12.7* 15.4 15.0 14.9   HCT 37.6* 44.9 44.9 43.0    231 218 291   LYMPH 43 29  --  18        HgA1c:   Lab Results   Component Value Date    A1C 12.3 (H) 2022        Thyroid Stimulating Hormone:   Lab Results   Component Value Date    TSH 1.81 2022    TSH 1.33 08/10/2018        Head CT (2022)  IMPRESSION:  1.  Vasogenic edema deep to the left frontal craniotomy is grossly stable. Underlying lesion is much better appreciated on previous MRI.     2.  Scattered  additional lesions that are visualized are grossly unchanged. Multiple lesions seen on the previous MRI are not visible on this CT.     3.  No hemorrhage or recent infarct by CT.    Impression:  Mr. Emerson is a 42 year old male admitted 7/18/2022 with first time seizure (though perhaps the previous events this week represent simple seizures as well).  This correlates with the areas of edema from the recent surgery.  Neurosurgery has weighed in regarding steroids.  I agree with continuing Keppra 1000mg BID.      Even though this event did not cause any alterations in consciousness, I told him not to drive for 3 months.  Such a violent event would be devastating if driving.  We went over other restrictions such as no climbing up heights, using power tools, swimming, tub baths, working near open flames / camp fires.      I discussed possible side effects of Keppra including drowsiness, irritability, and depression and to seek attention with any of these as this could necessitate changing his antiseizure medicine.      He will need an EEG and repeat MRI (he already has an MRI scheduled in a couple of weeks) .  Though this is not going to  and can be done as an outpatient as well as long as no further events.      He will need to follow up with general neurology as an outpatient to manage his seizure medication.  Should follow up within 1-2 months (assuming no further events or issues).      Manuel Morris MD (general neurology)  pgr [phone]    1. Hospital discharge follow-up    2. Seizure (H)    3. Hyperglycemia

## 2022-07-18 NOTE — PLAN OF CARE
End of shift summary- [id number]  Dx: Seizures and hyperglycemia  A/O: Alert and Oriented x4  Diet: Regular  Fluids: has Normal Saline 0.9% running at 100 mL per hour.  Transfer: SBA/Ind  Bathroom: Voiding in urinal  Pain: denying pain.  Telemetry Monitoring: Yes - normal sinus rhythm  Treatment: Seizure precautions, Keppra BID, blood sugars, telemetry, IVF, Neurology consult  Discharge Plans: tbd        Blood pressure (!) 142/76, pulse 89, temperature 98  F (36.7  C), temperature source Oral, resp. rate 18, SpO2 95 %.

## 2022-07-18 NOTE — ED PROVIDER NOTES
History   Chief Complaint:  Seizures       The history is provided by the patient.      Connor Emerson is a 42 year old male with history of type 2 diabetes and lung cancer who presents with seizures. The patient reports that today he had 3 seizures. He denies visual disturbance, weakness, numbness, fever, and congestion. The patient mentions having a deep cough which started after developing lung cancer. He mentions having a brain tumor removed on June 28th of this year. The patient says he had two beers today. He also mentions that his wife is out of town, and he has been inconsistent with taking his insulin the past few days.  Although There is listed in the patient's medication list, he is not aware that he was taking this medication and has been prescribed an antiepileptic drug.    Review of Systems   Constitutional: Negative for fever.   HENT: Negative for congestion.    Eyes: Negative for visual disturbance.   Neurological: Positive for seizures. Negative for weakness and numbness.   All other systems reviewed and are negative.    Allergies:  Viodin    Medications:  Celexa  Decadron  Pepcid  Hydrodiuril  Keppra  Robaxin  Roxicodone  Compazine    Past Medical History:     Insomnia  Obesity  Anxiety  GERD  Chronic migraine  Type 2 diabetes  Adjustment disorder   Epicondylitis right elbow  Cellulitis of right hand  Pleural effusion  Neoplasm lower lobe right lung  Brain metastasis  Severe sepsis    Past Surgical History:    Bronchoscopy  Block medial branch  Insertion chest tube  Left stealth craniotomy  Rotator cuff repair  Pleuroscopy    Family History:    Mother: Uterine cancer    Social History:  Patient unaccompanied.    Physical Exam     Patient Vitals for the past 24 hrs:   BP Temp Temp src Pulse Resp SpO2   07/18/22 0330 (!) 143/95 -- -- 98 -- 95 %   07/18/22 0315 (!) 144/90 -- -- 102 -- 94 %   07/18/22 0245 -- -- -- 110 20 92 %   07/18/22 0230 (!) 162/101 -- -- 107 20 91 %   07/18/22 0215 (!) 154/92 --  -- 105 22 92 %   07/18/22 0200 (!) 155/111 -- -- 114 22 91 %   07/18/22 0156 (!) 188/111 98.4  F (36.9  C) Oral 120 20 100 %       Physical Exam  Gen: alert  HEENT: PERRL, oropharynx clear  Neck: normal ROM  CV: RRR, no murmurs  Pulm: breath sounds equal, lungs clear  Abd: Soft, nontender  Back: no evidence of injury, no cva tenderness  MSK: no deformity, moves all extremities  Skin: no rash  Neuro: alert, oriented x3, PERRL, EOMI, CN 2-7 and 9-12 intact, 5/5 grasp BUE, 5/5 elbow flexion and extension BUE, 5/5 shoulder flexionBUE, 5/5 hip flexion, knee flexion, knee extension, plantar and dorsiflexion BLE, no pronator drift,  no dysdiadochokinesia, normal finger-nose-finger testing       Emergency Department Course   ECG  ECG results from 02/24/22   EKG 12 lead     Value    Systolic Blood Pressure     Diastolic Blood Pressure     Ventricular Rate 94    Atrial Rate 94    RI Interval 132    QRS Duration 94        QTc 442    P Axis 38    R AXIS 36    T Axis 21    Interpretation ECG      Sinus rhythm  Possible Left atrial enlargement  Borderline ECG  When compared with ECG of 19-JAN-2022 19:52,  Nonspecific T wave abnormality no longer evident in Lateral leads  Confirmed by - EMERGENCY ROOM, PHYSICIAN (Larissa),  JUMANA CUMMINGS (Lakeshia) on 2/25/2022 6:48:19 AM         Imaging:  Chest XR,  PA & LAT   Final Result   IMPRESSION: Mild bibasilar atelectasis or scar.      Head CT w/o contrast   Final Result   IMPRESSION:   1.  Vasogenic edema deep to the left frontal craniotomy is grossly stable. Underlying lesion is much better appreciated on previous MRI.      2.  Scattered additional lesions that are visualized are grossly unchanged. Multiple lesions seen on the previous MRI are not visible on this CT.      3.  No hemorrhage or recent infarct by CT.        Report per radiology    Laboratory:  Labs Ordered and Resulted from Time of ED Arrival to Time of ED Departure   BASIC METABOLIC PANEL - Abnormal       Result  Value    Sodium 139      Potassium 3.0 (*)     Chloride 102      Carbon Dioxide (CO2) 27      Anion Gap 10      Urea Nitrogen 13      Creatinine 0.66      Calcium 8.7      Glucose 621 (*)     GFR Estimate >90     HEPATIC FUNCTION PANEL - Abnormal    Bilirubin Total 0.7      Bilirubin Direct 0.2      Protein Total 6.4 (*)     Albumin 3.5      Alkaline Phosphatase 164 (*)     AST 7      ALT 21     CBC WITH PLATELETS AND DIFFERENTIAL - Abnormal    WBC Count 9.8      RBC Count 4.01 (*)     Hemoglobin 12.7 (*)     Hematocrit 37.6 (*)     MCV 94      MCH 31.7      MCHC 33.8      RDW 14.5      Platelet Count 247      % Neutrophils 46      % Lymphocytes 43      % Monocytes 7      % Eosinophils 3      % Basophils 0      % Immature Granulocytes 1      NRBCs per 100 WBC 0      Absolute Neutrophils 4.6      Absolute Lymphocytes 4.2      Absolute Monocytes 0.7      Absolute Eosinophils 0.3      Absolute Basophils 0.0      Absolute Immature Granulocytes 0.1      Absolute NRBCs 0.0     GLUCOSE BY METER - Abnormal    GLUCOSE BY METER POCT 586 (*)    GLUCOSE BY METER - Abnormal    GLUCOSE BY METER POCT 504 (*)    KETONE BETA-HYDROXYBUTYRATE QUANTITATIVE, RAPID - Normal    Ketone (Beta-Hydroxybutyrate) Quantitative 0.1     ETHYL ALCOHOL LEVEL - Normal    Alcohol ethyl <0.01     KEPPRA (LEVETIRACETAM) LEVEL   GLUCOSE MONITOR NURSING POCT   COVID-19 VIRUS (CORONAVIRUS) BY PCR   GLUCOSE MONITOR NURSING POCT      Emergency Department Course:     Reviewed:  I reviewed nursing notes, vitals, past medical history and Care Everywhere    Assessments:  0153 I obtained history and examined the patient as noted above.   0205 I rechecked the patient and explained findings.   0256 I rechecked the patient and explained findings.    Consults:  0326 I spoke with Dr. Coronel, neurosurgery, regarding the patient.  0340 I spoke with Dr. Castelan, hospitalist, regarding the patient.    Interventions:  0154 Ativan 1 mg IV  0202 NS 1 L IV  0226 Keppra 2,000 mg  IV  0311 Klor-Co 40 mEq Oral  0312 Novolog 10 units subcutaneous    Disposition:  The patient was admitted to the hospital under the care of Dr. Castelan.     Impression & Plan     Medical Decision Making:  Seizure-status post craniotomy.  CT head is stable.  No signs of hemorrhage.  Patient with seizure x3 prior to arrival.  No additional seizure activity in the emergency department.  Ativan given.  Patient is Keppra listed in chart however has not been taking this.  Is not aware that he was previously prescribed antiepileptic drug.  Keppra load given.  Keppra level sent and pending to confirm that he is not taking.  Consulted with neurosurgery-elizabeth for admit at [facility].  Recommend neurology consultation for seizure control.  No indications for acute steroids.    Hyperglycemia-likely secondary to medication noncompliance.  Poorly controlled diabetes at baseline.  No signs of DKA.  Will resume home Lantus.  Aspirin given.  Fluids given.    Hypokalemia-likely related to hydrochlorothiazide.  Potassium replacement given.    Case was discussed with Dr. Castelan of the hospital service admitted to medical bed.      Diagnosis:    ICD-10-CM    1. Seizure (H)  R56.9    2. Hyperglycemia  R73.9        Discharge Medications:  New Prescriptions    No medications on file       Scribe Disclosure:  Fifi MUNGUIA, am serving as a scribe at 3:42 AM on 7/18/2022 to document services personally performed by Leatha Roberts based on my observations and the provider's statements to me.          Leatha Roberts MD  07/18/22 0427

## 2022-07-18 NOTE — PROGRESS NOTES
"CLINICAL NUTRITION SERVICES - ASSESSMENT NOTE     Nutrition Prescription    RECOMMENDATIONS FOR MDs/PROVIDERS TO ORDER:  None    Malnutrition Status:    Moderate malnutrition in the context of chronic illness    Recommendations already ordered by Registered Dietitian (RD):  None    Future/Additional Recommendations:  Monitor oral intake     REASON FOR ASSESSMENT  Connor Emerson is a/an 42 year old male assessed by the dietitian for Admission Nutrition Risk Screen for weight loss    NUTRITION HISTORY  Patient states that he has not had any recent weight loss. He has had a low appetite for 1-2 weeks prior to admission. He is having 3 meals/day and does not want any nutrition supplement at this time.     CURRENT NUTRITION ORDERS  Diet: High Consistent Carbohydrate  Intake/Tolerance: No intake recorded since admission    LABS  BG >300, K 3.0(L)    MEDICATIONS  Novolog, lantus    ANTHROPOMETRICS  Height: 5' 9\"  Most Recent Weight: 94.4 kg (208 lb 1.6 oz)    IBW: 73 kg  BMI: Obesity Grade I BMI 30-34.9  Weight History:   Wt Readings from Last 10 Encounters:   07/18/22 94.4 kg (208 lb 1.6 oz)   07/14/22 91.6 kg (202 lb)   06/28/22 91.6 kg (202 lb)   06/27/22 92.4 kg (203 lb 9.6 oz)   06/24/22 92.3 kg (203 lb 8 oz)   06/17/22 92.4 kg (203 lb 11.2 oz)   06/10/22 99.8 kg (220 lb)   05/04/22 98 kg (216 lb)   04/11/22 95.6 kg (210 lb 12.8 oz)   04/02/22 93 kg (205 lb)   Dosing Weight: 73 kg IBW    ASSESSED NUTRITION NEEDS  Estimated Energy Needs: 8561-0658 kcals/day (25 - 30 kcals/kg)  Justification: Maintenance  Estimated Protein Needs: 58-73 grams protein/day (0.8 - 1 grams of pro/kg)  Justification: Maintenance  Estimated Fluid Needs:  (1 mL/kcal)   Justification: Maintenance    MALNUTRITION  % Intake: < 75% for > 7 days (moderate)  % Weight Loss: 5% in 1 month (moderate)  Subcutaneous Fat Loss: None observed  Muscle Loss: None observed  Fluid Accumulation/Edema: None noted  Malnutrition Diagnosis: Moderate malnutrition in " the context of chronic illness    NUTRITION DIAGNOSIS  Malnutrition related to chronic illness as evidenced by decreased appetite and 5% weight loss in 1 month    INTERVENTIONS  Implementation  None at this time    Goals  Patient to consume % of nutritionally adequate meal trays TID, or the equivalent with supplements/snacks.     Monitoring/Evaluation  Progress toward goals will be monitored and evaluated per protocol.  Ria Ferrer RDN

## 2022-07-18 NOTE — ED NOTES
DATE:  7/18/2022   TIME OF RECEIPT FROM LAB:  0249  LAB TEST:  Glucose  LAB VALUE:  621  RESULTS GIVEN WITH READ-BACK TO (PROVIDER):  Leatha Roberts  TIME LAB VALUE REPORTED TO PROVIDER:   0249

## 2022-07-18 NOTE — PLAN OF CARE
Problem: Malnutrition  Goal: Improved Nutritional Intake  Outcome: Ongoing, Progressing     Problem: Oral Intake Inadequate  Goal: Improved Oral Intake  Outcome: Ongoing, Progressing   Goal Outcome Evaluation:      Patient reports consuming 3 meals/day with a low appetite. No intake recorded during admission. Will monitor.    Ria Ferrer RDN

## 2022-07-18 NOTE — ED NOTES
[facility]  ED Nurse Handoff Report    Connor Emerson is a 42 year old male   ED Chief complaint: Seizures  . ED Diagnosis:   Final diagnoses:   Seizure (H)   Hyperglycemia     Allergies:   Allergies   Allergen Reactions     Vicodin [Hydrocodone-Acetaminophen] Nausea and Vomiting and GI Disturbance       Code Status: Full Code  Activity level - Baseline/Home:  Independent. Activity Level - Current:   Stand by Assist. Lift room needed: No. Bariatric: No   Needed: No   Isolation: No. Infection: Not Applicable.     Vital Signs:   Vitals:    07/18/22 0230 07/18/22 0245 07/18/22 0315 07/18/22 0330   BP: (!) 162/101  (!) 144/90 (!) 143/95   Pulse: 107 110 102 98   Resp: 20 20     Temp:       TempSrc:       SpO2: 91% 92% 94% 95%       Cardiac Rhythm:  ,      Pain level:    Patient confused: No. Patient Falls Risk: Yes.   Elimination Status: Has voided   Patient Report - Initial Complaint: Pt arrives via EMS from home after clonic tonic seizure. Patient has history of lung and brain cancer. Had brain surgery 6/28. Has been having right arm seizure/tremors since surgery. Tonight had full clonic tonic surgery. Had witness right arm seizure by EMS. Blood sugar HIGH for EMS. Left 18 G IV, 100 mL NS given by EMS.      Patient arrives alert and oriented. Denies headache.     Dr. Roberts called to bedside for seizure prevention.     Seizure pads in place. . Focused Assessment:   03:48 Neuro Cognitive Neuro Cognitive (Adult) - Cognitive/Neuro/Behavioral WDL: .WDL except (C/o multiple sezure tomight. Known brain tumor that pt had surgery on in june. Pt unable to recall meds he is currently taking. Pt A&Ox4 upon assessment. Right sided arm tremor intermittent) Level of Consciousness: alert  Arousal Level: opens eyes spontaneously  Orientation: oriented x 4  Speech: spontaneous; clear; logical  Mood/Behavior: calm; cooperative   [place] Coma Scale - Best Eye Response: 4-->(E4) spontaneous  Best Motor Response:  6-->(M6) obeys commands  Best Verbal Response: 5-->(V5) oriented  [place] Coma Scale Score: 15   Stroke Exam Continued - Level of Consciousness: alert  Arousal Level: opens eyes spontaneously  Orientation: oriented x 4  Speech: spontaneous; clear; logical   Motor Strength - Left Upper Motor Strength: 5 - active movement against gravity and full resistance  Right Upper Motor Strength: 5 - active movement against gravity and full resistance  Left Lower Motor Strength: 5 - active movement against gravity and full resistance  Right Lower Motor Strength: 5 - active movement against gravity and full resistance         Tests Performed: Labs and Imaging. Abnormal Results:   Labs Ordered and Resulted from Time of ED Arrival to Time of ED Departure   BASIC METABOLIC PANEL - Abnormal       Result Value    Sodium 139      Potassium 3.0 (*)     Chloride 102      Carbon Dioxide (CO2) 27      Anion Gap 10      Urea Nitrogen 13      Creatinine 0.66      Calcium 8.7      Glucose 621 (*)     GFR Estimate >90     HEPATIC FUNCTION PANEL - Abnormal    Bilirubin Total 0.7      Bilirubin Direct 0.2      Protein Total 6.4 (*)     Albumin 3.5      Alkaline Phosphatase 164 (*)     AST 7      ALT 21     CBC WITH PLATELETS AND DIFFERENTIAL - Abnormal    WBC Count 9.8      RBC Count 4.01 (*)     Hemoglobin 12.7 (*)     Hematocrit 37.6 (*)     MCV 94      MCH 31.7      MCHC 33.8      RDW 14.5      Platelet Count 247      % Neutrophils 46      % Lymphocytes 43      % Monocytes 7      % Eosinophils 3      % Basophils 0      % Immature Granulocytes 1      NRBCs per 100 WBC 0      Absolute Neutrophils 4.6      Absolute Lymphocytes 4.2      Absolute Monocytes 0.7      Absolute Eosinophils 0.3      Absolute Basophils 0.0      Absolute Immature Granulocytes 0.1      Absolute NRBCs 0.0     GLUCOSE BY METER - Abnormal    GLUCOSE BY METER POCT 586 (*)    KETONE BETA-HYDROXYBUTYRATE QUANTITATIVE, RAPID - Normal    Ketone (Beta-Hydroxybutyrate) Quantitative  0.1     ETHYL ALCOHOL LEVEL - Normal    Alcohol ethyl <0.01     KEPPRA (LEVETIRACETAM) LEVEL   GLUCOSE MONITOR NURSING POCT   COVID-19 VIRUS (CORONAVIRUS) BY PCR     Chest XR,  PA & LAT   Final Result   IMPRESSION: Mild bibasilar atelectasis or scar.      Head CT w/o contrast   Final Result   IMPRESSION:   1.  Vasogenic edema deep to the left frontal craniotomy is grossly stable. Underlying lesion is much better appreciated on previous MRI.      2.  Scattered additional lesions that are visualized are grossly unchanged. Multiple lesions seen on the previous MRI are not visible on this CT.      3.  No hemorrhage or recent infarct by CT.      .   Treatments provided: See MAR- IVF, Keppra, ativan,   Family Comments: Wife and son at bedside in ED  OBS brochure/video discussed/provided to patient:  N/A  ED Medications:   Medications   insulin glargine (LANTUS PEN) injection 30 Units (has no administration in time range)   LORazepam (ATIVAN) 2 MG/ML injection (1 mg  Given 7/18/22 0154)   0.9% sodium chloride BOLUS (0 mLs Intravenous Stopped 7/18/22 0312)   levETIRAcetam (KEPPRA) 2,000 mg in sodium chloride 0.9 % 250 mL intermittent infusion (0 mg Intravenous Stopped 7/18/22 0241)   insulin aspart (NovoLOG) injection (RAPID ACTING) (10 Units Subcutaneous Given 7/18/22 0312)   potassium chloride (KLOR-CON) Packet 40 mEq (40 mEq Oral Given 7/18/22 0311)     Drips infusing:  No  For the majority of the shift, the patient's behavior Green. Interventions performed were N/A.    Sepsis treatment initiated: No     Patient tested for COVID 19 prior to admission: YES    ED Nurse Name/Phone Number: Esperanza Cuevas RN,   3:49 AM  RECEIVING UNIT ED HANDOFF REVIEW    Above ED Nurse Handoff Report was reviewed: Yes  Reviewed by: Antwan Montero RN on July 18, 2022 at 11:46 AM

## 2022-07-18 NOTE — PLAN OF CARE
ER admit. Vss A&Ox4 . No sz witnessed. Denies shaking in right arm. . Pt steady and would like to be up ind. Education given to pt on safety and fall prevention. Pt agrees to call if he has any concerns or any changes.

## 2022-07-18 NOTE — CONSULTS
"NEUROSURGERY CONSULT    Neurosurgery was asked by Dr. Roberts to consult for seizure activity.      PLAN:    Mr. Emerson's most recent imaging exhibits stable  Vasogenic edema deep to the left frontal craniotomy. We do not recommend surgical intervention at this time. We have initiated 2 mg of decadron to be administered every day to assist with the radiation necrosis. Neurology has been asked to evaluate and treat seizure activity. Keppra has also been initiated and Mr. Emerson states that he is doing much better.     We also discussed signs of a worsening problem that he should seek being evaluated.     Please page our on-call service for any questions or concerns.     It has been a pleasure meeting Connor Emerson. Thank you for having us be involved in his care.    ______________________________________________________________________    HPI:    Connor Emerson is a pleasant 42 year old male who is well known to the Neurosurgery team as Dr. Moran performed a left stealth craniotomy for tumor resection with motor mapping who unfortunately presented to the [facility] Emergency Department for consultation after suffering three unwitnessed seizures. He does admit that over the last week he has been experiencing \"shaking\" episodes. He also admits that he has not been compliant with his antiepileptic or diabetes medication. There are no bowel or bladder changes. No other concerns are voiced.    Past Medical History:   Diagnosis Date     Atypical chest pain 12/02/2013     Cancer (H)      Complication of anesthesia      Diabetes (H)      GERD (gastroesophageal reflux disease) 12/02/2013     HTN (hypertension) 05/14/2012     HTN, goal below 140/90 07/02/2013     Insomnia 02/21/2012     Mediastinal lymphadenopathy      Migraine headache 07/02/2013     Migraine with aura, without mention of intractable migraine without mention of status migrainosus      Pneumonia        Past Surgical History:   Procedure Laterality Date     " BRONCHOSCOPY RIGID OR FLEXIBLE W/TRANSENDOSCOPIC ENDOBRONCHIAL ULTRASOUND GUIDED Bilateral 1/26/2022    Procedure: Right BRONCHOSCOPY, FIBEROPTIC, endobronchial ultrasound, pleural biopsy;  Surgeon: Dallin Agrawal MD;  Location: [facility] OR     INJECT BLOCK MEDIAL BRANCH CERVICAL/THORACIC/LUMBAR       INSERT CHEST TUBE Right 2/16/2022    Procedure: INSERTION, CATHETER, INTERCOSTAL, FOR DRAINAGE;  Surgeon: Dallin Agrawal MD;  Location: [facility] GI     INSERT CHEST TUBE Right 3/9/2022    Procedure: INSERTION, CATHETER, INTERCOSTAL, FOR DRAINAGE;  Surgeon: Sushila Antonio MD;  Location: [facility] GI     IR CHEST TUBE REMOVAL TUNNELED RIGHT  4/2/2022     OPTICAL TRACKING SYSTEM CRANIOTOMY, EXCISE TUMOR, COMBINED Left 6/28/2022    Procedure: Left stealth craniotomy for tumor resection with motor mapping;  Surgeon: Stephen Moran MD;  Location: [facility] OR     ORTHOPEDIC SURGERY      Ganesh. Rotator cuff repair.     PLEUROSCOPY N/A 1/26/2022    Procedure: Pleuroscopy with Pleural Biopsy;  Surgeon: Dallin Agrawal MD;  Location: [facility] OR       Allergies   Allergen Reactions     Vicodin [Hydrocodone-Acetaminophen] Nausea and Vomiting and GI Disturbance       Social History     Tobacco Use     Smoking status: Never Smoker     Smokeless tobacco: Never Used   Substance Use Topics     Alcohol use: Yes     Alcohol/week: 0.0 standard drinks     Comment: social       Family History   Problem Relation Age of Onset     Unknown/Adopted Mother      Uterine Cancer Mother      Unknown/Adopted Father      Unknown/Adopted Maternal Grandmother      Unknown/Adopted Maternal Grandfather      Stomach Cancer Maternal Grandfather      Unknown/Adopted Paternal Grandmother      Unknown/Adopted Paternal Grandfather      Melanoma Maternal Uncle        Scheduled Medications      sodium chloride 0.9%  1,000 mL Intravenous Once     dexamethasone  2 mg Oral Daily     insulin aspart  1-7 Units Subcutaneous TID AC     insulin aspart  1-5 Units Subcutaneous At Bedtime      [START ON 7/19/2022] insulin glargine  30 Units Subcutaneous Daily     levETIRAcetam  1,000 mg Oral BID     sodium chloride (PF)  3 mL Intracatheter Q8H       Home Medications  Celexa  Decadron  Pepcid  Hydrodiuril  Keppra  Robaxin  Roxicodone  Compazine    PRN Medications    acetaminophen **OR** acetaminophen, alum & mag hydroxide-simethicone, glucose **OR** dextrose **OR** glucagon, lidocaine 4%, lidocaine (buffered or not buffered), LORazepam, melatonin, ondansetron **OR** ondansetron, sodium chloride (PF)    ROS: 10 point ROS neg other than the symptoms noted above in the HPI.    Vitals:    /81   Pulse 76   Temp 98  F (36.7  C) (Oral)   Resp 19   SpO2 96%   There is no height or weight on file to calculate BMI.  I/O last 3 completed shifts:  In: -   Out: 1225 [Urine:1225]    Exam:    Pt examined in ED28. Pt appears comfortable and in no apparent distress, moving all extremities.     Head: Normocephalic, without obvious abnormality, atraumatic, no facial asymmetry.   Eyes: conjunctivae/corneas clear. PERRL, EOM's intact.   Throat: lips, mucosa, and tongue normal; teeth and gums normal.   Neck: supple, symmetrical, trachea midline, no adenopathy and thyroid: not enlarged, symmetric, no tenderness/mass/nodules.   Lungs: clear to auscultation bilaterally.   Heart: regular rate and rhythm.   Abdomen: soft, non-tender; bowel sounds normal; no masses, no organomegaly.   Pulses: 2+ and symmetric.   Skin: Skin color, texture, turgor normal. No rashes or lesions.     Alert and oriented to date and time. Able to recall the current president of [place].   CN II: Able to read nametag, VF full with gross confrontation.   CN III,IV,VI: MARVIN, Extraocular movements full, absent for nystagmus, absent for ptosis.   CN V: Full sensation in V1,V2,V3, jaw clench symmetrical.   CN VII: Face symmetrical and with equal strength, able to puff cheeks out.   CN VIII: Able to hear conversation.   CN IX: Able to push tongue  against bilateral cheeks.   CN X: Palate elevates symmetrically, uvula midline.   CN XI: Elevate shoulders symmetrical, full strength when turning head from side to side.   CNXII: Tongue protrudes midline, absent for fasciculation.   Able to name 3/3 objects.   Finger to nose slow and accurate.   Rapid hand movements intact.   Absent for upper and lower extremity drift.     GCS:   Eye: eyes open spontaneously (4)   Motor: obeys commands (6)   Verbal: oriented (5)   Composite score: 15     Bilateral upper extremities 5/5. Bilateral bicep and triceps reflexes 2/4. Sensation intact throughout. Normal ROM.   Bilateral lower extremities 5/5. Normal sensation t/o bilaterally. Bilateral patellar 2/4 and achilles reflex 1/4.   Calves soft and non-tender.     ECG/Telemetry:  ECG results from 02/24/22   EKG 12 lead     Value     Systolic Blood Pressure       Diastolic Blood Pressure       Ventricular Rate 94     Atrial Rate 94     WA Interval 132     QRS Duration 94          QTc 442     P Axis 38     R AXIS 36     T Axis 21     Interpretation ECG         Sinus rhythm  Possible Left atrial enlargement  Borderline ECG  When compared with ECG of 19-JAN-2022 19:52,  Nonspecific T wave abnormality no longer evident in Lateral leads  Confirmed by - EMERGENCY ROOM, PHYSICIAN ([id number]),  JUMANA CUMMINGS ([place]) on 2/25/2022 6:48:19 AM         Imaging: CT HEAD W/O CONTRAST - 7/18/2022 2:47 AM  Impression per radiology read - I have personally reviewed the images with the patient.    1.  Vasogenic edema deep to the left frontal craniotomy is grossly stable. Underlying lesion is much better appreciated on previous MRI.     2.  Scattered additional lesions that are visualized are grossly unchanged. Multiple lesions seen on the previous MRI are not visible on this CT.     3.  No hemorrhage or recent infarct by CT.    Available labs at time of consult:       Recent Labs   Lab 07/18/22  0203   WBC 9.8   HGB 12.7*   HCT 37.6*   MCV  94        Recent Labs   Lab 07/18/22  0203   WBC 9.8   HGB 12.7*   HCT 37.6*   MCV 94        No results for input(s): SED, CRP in the last 168 hours.  Recent Labs   Lab 07/18/22  0203   HGB 12.7*     No results for input(s): INR in the last 168 hours.  Recent Labs   Lab 07/18/22  0203        Recent Labs   Lab 07/18/22  0203   WBC 9.8         Respectfully,    ERICKSON Neri, PA-C  [facility] Neurosurgery  [facility]     Tel: [phone]      All imaging, physical findings, and the above plan have been reviewed with Dr. Moran.

## 2022-07-19 ENCOUNTER — TELEPHONE (OUTPATIENT)
Dept: NEUROSURGERY | Facility: CLINIC | Age: 42
End: 2022-07-19

## 2022-07-19 VITALS
HEART RATE: 91 BPM | SYSTOLIC BLOOD PRESSURE: 126 MMHG | RESPIRATION RATE: 18 BRPM | WEIGHT: 208.1 LBS | OXYGEN SATURATION: 95 % | BODY MASS INDEX: 30.73 KG/M2 | TEMPERATURE: 98.4 F | DIASTOLIC BLOOD PRESSURE: 89 MMHG

## 2022-07-19 LAB
ANION GAP SERPL CALCULATED.3IONS-SCNC: 4 MMOL/L (ref 3–14)
BUN SERPL-MCNC: 11 MG/DL (ref 7–30)
CALCIUM SERPL-MCNC: 8 MG/DL (ref 8.5–10.1)
CHLORIDE BLD-SCNC: 108 MMOL/L (ref 94–109)
CO2 SERPL-SCNC: 29 MMOL/L (ref 20–32)
CREAT SERPL-MCNC: 0.54 MG/DL (ref 0.66–1.25)
ERYTHROCYTE [DISTWIDTH] IN BLOOD BY AUTOMATED COUNT: 14.5 % (ref 10–15)
GFR SERPL CREATININE-BSD FRML MDRD: >90 ML/MIN/1.73M2
GLUCOSE BLD-MCNC: 250 MG/DL (ref 70–99)
GLUCOSE BLDC GLUCOMTR-MCNC: 270 MG/DL (ref 70–99)
GLUCOSE BLDC GLUCOMTR-MCNC: 271 MG/DL (ref 70–99)
GLUCOSE BLDC GLUCOMTR-MCNC: 322 MG/DL (ref 70–99)
HCT VFR BLD AUTO: 34.6 % (ref 40–53)
HGB BLD-MCNC: 11.6 G/DL (ref 13.3–17.7)
MCH RBC QN AUTO: 31.9 PG (ref 26.5–33)
MCHC RBC AUTO-ENTMCNC: 33.5 G/DL (ref 31.5–36.5)
MCV RBC AUTO: 95 FL (ref 78–100)
MISCELLANEOUS TEST 1 (ARUP): NORMAL
PLATELET # BLD AUTO: 215 10E3/UL (ref 150–450)
POTASSIUM BLD-SCNC: 3.1 MMOL/L (ref 3.4–5.3)
POTASSIUM BLD-SCNC: 3.5 MMOL/L (ref 3.4–5.3)
RBC # BLD AUTO: 3.64 10E6/UL (ref 4.4–5.9)
SODIUM SERPL-SCNC: 141 MMOL/L (ref 133–144)
WBC # BLD AUTO: 8.9 10E3/UL (ref 4–11)

## 2022-07-19 PROCEDURE — 36415 COLL VENOUS BLD VENIPUNCTURE: CPT | Performed by: INTERNAL MEDICINE

## 2022-07-19 PROCEDURE — 85027 COMPLETE CBC AUTOMATED: CPT | Performed by: INTERNAL MEDICINE

## 2022-07-19 PROCEDURE — G0378 HOSPITAL OBSERVATION PER HR: HCPCS

## 2022-07-19 PROCEDURE — 84132 ASSAY OF SERUM POTASSIUM: CPT | Mod: 91 | Performed by: INTERNAL MEDICINE

## 2022-07-19 PROCEDURE — 80048 BASIC METABOLIC PNL TOTAL CA: CPT | Performed by: INTERNAL MEDICINE

## 2022-07-19 PROCEDURE — 99239 HOSP IP/OBS DSCHRG MGMT >30: CPT | Performed by: INTERNAL MEDICINE

## 2022-07-19 PROCEDURE — 250N000013 HC RX MED GY IP 250 OP 250 PS 637: Performed by: INTERNAL MEDICINE

## 2022-07-19 PROCEDURE — 250N000011 HC RX IP 250 OP 636: Performed by: PHYSICIAN ASSISTANT

## 2022-07-19 PROCEDURE — 96372 THER/PROPH/DIAG INJ SC/IM: CPT

## 2022-07-19 RX ORDER — POTASSIUM CHLORIDE 1500 MG/1
40 TABLET, EXTENDED RELEASE ORAL ONCE
Status: COMPLETED | OUTPATIENT
Start: 2022-07-19 | End: 2022-07-19

## 2022-07-19 RX ORDER — HYDROCHLOROTHIAZIDE 25 MG/1
25 TABLET ORAL DAILY
Status: DISCONTINUED | OUTPATIENT
Start: 2022-07-19 | End: 2022-07-19 | Stop reason: HOSPADM

## 2022-07-19 RX ORDER — CITALOPRAM HYDROBROMIDE 20 MG/1
20 TABLET ORAL EVERY EVENING
Status: DISCONTINUED | OUTPATIENT
Start: 2022-07-19 | End: 2022-07-19 | Stop reason: HOSPADM

## 2022-07-19 RX ORDER — LEVETIRACETAM 1000 MG/1
1000 TABLET ORAL 2 TIMES DAILY
Qty: 60 TABLET | Refills: 0 | Status: SHIPPED | OUTPATIENT
Start: 2022-07-19 | End: 2022-08-24

## 2022-07-19 RX ORDER — DEXAMETHASONE 2 MG/1
2 TABLET ORAL DAILY
Qty: 30 TABLET | Refills: 0 | Status: SHIPPED | OUTPATIENT
Start: 2022-07-20 | End: 2022-08-29

## 2022-07-19 RX ADMIN — DEXAMETHASONE 2 MG: 2 TABLET ORAL at 09:30

## 2022-07-19 RX ADMIN — POTASSIUM CHLORIDE 40 MEQ: 1500 TABLET, EXTENDED RELEASE ORAL at 09:30

## 2022-07-19 RX ADMIN — LEVETIRACETAM 1000 MG: 500 TABLET, FILM COATED ORAL at 09:30

## 2022-07-19 RX ADMIN — HYDROCHLOROTHIAZIDE 25 MG: 25 TABLET ORAL at 09:30

## 2022-07-19 ASSESSMENT — ACTIVITIES OF DAILY LIVING (ADL)
DEPENDENT_IADLS:: MEAL PREPARATION
ADLS_ACUITY_SCORE: 22

## 2022-07-19 NOTE — PLAN OF CARE
Goal Outcome Evaluation:    A/O x 4, VSS on RA. denies pain, denies R  arm shaking. On seizure precautions.  and 297.  Tele SR

## 2022-07-19 NOTE — PROGRESS NOTES
Care Management Discharge Note    Discharge Date: 07/19/2022       Discharge Disposition: Home    Discharge Services: None    Discharge DME: None    Discharge Transportation: family or friend will provide    Private pay costs discussed: Not applicable      Patient/family educated on Medicare website which has current facility and service quality ratings: no    Education Provided on the Discharge Plan:  Yes  Persons Notified of Discharge Plans: Patient  Patient/Family in Agreement with the Plan: yes    Handoff Referral Completed: Yes    Additional Information:  Pt identified as a Service Bundle #4. Please refer to previous care management note for assessment. No care management needs identified at this time. Please consult CM/SW  if discharge needs should arise.      Maranda Hayes, RN      Maranda Hayes RN Case Manager  Inpatient Care Coordination  [facility]   [phone]

## 2022-07-19 NOTE — CONSULTS
Care Management Initial Consult    General Information  Assessment completed with: Patient,    Type of CM/SW Visit: Initial Assessment    Primary Care Provider verified and updated as needed: Yes   Readmission within the last 30 days: current reason for admission unrelated to previous admission   Return Category: New Diagnosis  Reason for Consult: discharge planning, care coordination/care conference     Communication Assessment  Patient's communication style: spoken language (English or Bilingual)    Hearing Difficulty or Deaf: no   Wear Glasses or Blind: yes    Cognitive  Cognitive/Neuro/Behavioral: WDL  Level of Consciousness: alert  Arousal Level: opens eyes spontaneously  Orientation: oriented x 4  Mood/Behavior: calm, cooperative  Best Language: 0 - No aphasia  Speech: spontaneous, clear, logical    Living Environment:   People in home: child(elsa), dependent, spouse           Able to return to prior arrangements: yes       Family/Social Support:  Care provided by: self  Provides care for:    Marital Status:   Wife  Candance       Description of Support System: Supportive, Involved    Support Assessment: Patient communicates needs well met, Adequate family and caregiver support    Current Resources:   Patient receiving home care services: No     Community Resources: None  Equipment currently used at home: none    Employment/Financial:  Employment Status: other (see comments) (Maintainance Supervisor)        Financial Concerns: No concerns identified           Lifestyle & Psychosocial Needs:  Social Determinants of Health     Tobacco Use: Low Risk      Smoking Tobacco Use: Never Smoker     Smokeless Tobacco Use: Never Used   Alcohol Use: Not on file   Financial Resource Strain: Not on file   Food Insecurity: Not on file   Transportation Needs: Not on file   Physical Activity: Not on file   Stress: Not on file   Social Connections: Not on file   Intimate Partner Violence: Not on file   Depression: Not at  risk     PHQ-2 Score: 0   Housing Stability: Not on file       Functional Status:  Prior to admission patient needed assistance:   Dependent ADLs:: Independent  Dependent IADLs:: Medication Preparation       Mental Health Status:  Mental Health Status: No Current Concerns             Values/Beliefs:  Spiritual, Cultural Beliefs, Confucianist Practices, Values that affect care: no         Care Management Follow Up    Length of Stay (days): 1    Expected Discharge Date: 07/19/2022     Concerns to be Addressed: denies needs/concerns at this time, no discharge needs identified     Patient plan of care discussed at interdisciplinary rounds: Yes    Anticipated Discharge Disposition: Home     Anticipated Discharge Services: None  Anticipated Discharge DME: None    Patient/family educated on Medicare website which has current facility and service quality ratings: no  Education Provided on the Discharge Plan:  Yes  Patient/Family in Agreement with the Plan: yes    Referrals Placed by CM/SW:  None  Private pay costs discussed: Not applicable    Additional Information:  Care management following as patient identified as an unplanned readmission risk.   Met with patient at bedside. Patient lives at home with his family. Patient states that wife is assisting with medication set up and management. Patient is independent with ADL's. Patient works as a  and is looking forward to returning to work.   No concerns from patient with returning home upon discharge.    Patient has multiple previously scheduled appointments with MTM, PCP, and neurosurgery.     No care management needs identified at this time.     Patient's wife to transport upon discharge.          ZAHIDA Robb RN Case Manager  Inpatient Care Coordination  [facility]   [phone]

## 2022-07-19 NOTE — DISCHARGE SUMMARY
[facility]  Discharge Summary  Hospitalist    Date of Admission:  7/18/2022  Date of Discharge:  7/19/2022  1:15 PM  Provider:  Saman Schaefer DO [facility]    Discharge Diagnoses   1.  Seizure  2.  Vasogenic edema following radiation      Other medical issues:  Past Medical History:   Diagnosis Date     Atypical chest pain 12/02/2013     Cancer (H)      Complication of anesthesia      Diabetes (H)      GERD (gastroesophageal reflux disease) 12/02/2013     HTN (hypertension) 05/14/2012     HTN, goal below 140/90 07/02/2013     Insomnia 02/21/2012     Mediastinal lymphadenopathy      Migraine headache 07/02/2013     Migraine with aura, without mention of intractable migraine without mention of status migrainosus      Pneumonia        History of Present Illness   Connor Emerson is an 42 year old male who presented with a seizure.  Please see the admission history and physical for full details.    Hospital Course   Connor Emerson was admitted on 7/18/2022.  The following problems were addressed during his hospitalization:    Mr. Emerson had a left stealth craniotomy for tumor resection in June.  He also has a history of some radiation.  He presented with a seizure.  He was reportedly supposed to be on Keppra BID but apparently there was some miscommunication and he was only taking it once daily.   Once he presented he was given BID Keppra and 2 mg daily decadron for vasogenic edema.  He did well and had no further acute neuro complaints.  Neurology and neurosurgery felt he could discharge with outpatient follow-up.  I did advise him to not drive for now until cleared in follow-up.      Other issues:  The patient has chronically poorly controlled DM.  The decadron exacerbated his glucose values further and his lantus dose was doubled to BID.  He may also need more meal adjustment but he wanted to return to his baseline meal coverage initially on discharge.  I recommended he follow-up with his diabetes clinic  in the near future and contact them earlier if glucoses start getting markedly high or low which I discussed in detail with him.      Significant Results and Procedures   See below    Pending Results     Unresulted Labs Ordered in the Past 30 Days of this Admission     Date and Time Order Name Status Description    7/18/2022  7:04 AM [id number]: [facility] Miscellaneous Test In process           Code Status   Full Code       Primary Care Physician   Radha Shetty    Blood pressure 126/89, pulse 91, temperature 98.4  F (36.9  C), temperature source Oral, resp. rate 18, weight 94.4 kg (208 lb 1.6 oz), SpO2 95 %.    Alert, oriented, appears comfortable and is communicating well.    Discharge Disposition   Discharged to home    Consultations This Hospital Stay   NEUROLOGY IP CONSULT    Time Spent on this Encounter   I, Saman Schaefer DO, personally saw the patient today and spent greater than 30 minutes discharging this patient.    Discharge Orders      Medication Therapy Management Referral      Reason for your hospital stay    Seizures     Follow-up and recommended labs and tests     1.  Follow-up with neurosurgery for MRI and clinic visit as you already have scheduled 8/1.  2.  Follow-up with your diabetes provider as you already have scheduled in a couple weeks.  3.  Follow-up with [facility] (RN to please give patient # to schedule) in 1 month.  You should have an EEG in the next couple weeks prior to your neurosurgery/neurology follow-up.  4.  Recommend primary provider follow-up in the next 1-2 weeks for a hospital recheck and blood pressure recheck.  I recommend you have your potassium level drawn day of follow-up.     Activity    Your activity upon discharge: activity as tolerated     When to contact your care team    Call if questions.  Notify provider/seek evaluation if new neurologic complaints/seizures.  If you blood sugars are running over 300 or under 70 also notify your diabetic  provider to discuss insulin dose changes.     Diet    Follow this diet upon discharge:  Diabetic     EEG    Send results to [facility] Dr. Dean AMEZCUA [facility] will call you to coordinate your care as prescribed by your provider. If you don't hear from a representative within 2 business days, please call [phone].       Discharge Medications   Current Discharge Medication List      START taking these medications    Details   dexamethasone (DECADRON) 2 MG tablet Take 1 tablet (2 mg) by mouth daily  Qty: 30 tablet, Refills: 0    Associated Diagnoses: Seizure (H)         CONTINUE these medications which have CHANGED    Details   insulin glargine (LANTUS PEN) 100 UNIT/ML pen Inject 30 Units Subcutaneous 2 times daily  Qty: 15 mL, Refills: 0    Comments: If Lantus is not covered by insurance, may substitute Basaglar or Semglee or other insulin glargine product per insurance preference at same dose and frequency.  Associated Diagnoses: Type 2 diabetes mellitus with hyperglycemia, with long-term current use of insulin (H)      levETIRAcetam (KEPPRA) 1000 MG tablet Take 1 tablet (1,000 mg) by mouth 2 times daily  Qty: 60 tablet, Refills: 0    Associated Diagnoses: Brain metastasis (H); Malignant neoplasm of lower lobe of right lung (H)         CONTINUE these medications which have NOT CHANGED    Details   alectinib (ALECENSA) 150 MG CAPS Take 3 capsules (450 mg) by mouth 2 times daily (with meals)  Qty: 180 capsule, Refills: 11    Comments: Dose change  Associated Diagnoses: Malignant neoplasm of lower lobe of right lung (H)      blood glucose (NO BRAND SPECIFIED) test strip Use to test blood sugar 2 times daily or as directed. To accompany: Blood Glucose Monitor Brands: per insurance.  Qty: 100 strip, Refills: 6    Associated Diagnoses: Type 2 diabetes mellitus with hyperglycemia, with long-term current use of insulin (H)      blood glucose calibration (NO BRAND SPECIFIED)  solution To accompany: Blood Glucose Monitor Brands: per insurance.  Qty: 1 each, Refills: 0    Associated Diagnoses: Type 2 diabetes mellitus with hyperglycemia, with long-term current use of insulin (H)      blood glucose monitoring (NO BRAND SPECIFIED) meter device kit Use to test blood sugar 2 times daily or as directed. Preferred blood glucose meter OR supplies to accompany: Blood Glucose Monitor Brands: per insurance.  Qty: 1 kit, Refills: 0    Associated Diagnoses: Type 2 diabetes mellitus with hyperglycemia, with long-term current use of insulin (H)      citalopram (CELEXA) 20 MG tablet Take 1 tablet (20 mg) by mouth every evening  Qty: 90 tablet, Refills: 0    Associated Diagnoses: Anxiety      Continuous Blood Gluc Sensor (FREESTYLE IRENE 2 SENSOR) MISC 1 each every 14 days Use 1 sensor every 14 days. Use to read blood sugars per 's instructions.  Qty: 2 each, Refills: 5    Associated Diagnoses: Type 2 diabetes mellitus without complication, with long-term current use of insulin (H)      hydrochlorothiazide (HYDRODIURIL) 25 MG tablet Take 1 tablet (25 mg) by mouth in the morning.  Qty: 30 tablet, Refills: 3    Associated Diagnoses: Primary hypertension      insulin aspart (NOVOLOG PEN) 100 UNIT/ML pen Inject 1-10 Units Subcutaneous 3 times daily (before meals)  Qty: 15 mL, Refills: 0    Comments: Refills per PCP  Associated Diagnoses: Type 2 diabetes mellitus with hyperglycemia, with long-term current use of insulin (H)      insulin pen needle (31G X 8 MM) 31G X 8 MM miscellaneous Use one pen needles daily or as directed.  Qty: 100 each, Refills: 0    Associated Diagnoses: Type 2 diabetes mellitus without complication (H)      alcohol swab prep pads Use to swab area of injection/jabier as directed.  Qty: 100 each, Refills: 3    Associated Diagnoses: Type 2 diabetes mellitus with hyperglycemia, with long-term current use of insulin (H)      methocarbamol (ROBAXIN) 500 MG tablet Take 1 tablet (500  mg) by mouth every 6 hours as needed for muscle spasms  Qty: 40 tablet, Refills: 0    Associated Diagnoses: Brain metastasis (H)      oxyCODONE (ROXICODONE) 5 MG tablet Take 1 tablet (5 mg) by mouth every 4 hours as needed for moderate to severe pain  Qty: 40 tablet, Refills: 0    Associated Diagnoses: Brain metastasis (H)      prochlorperazine (COMPAZINE) 10 MG tablet Take 1 tablet (10 mg) by mouth every 6 hours as needed (Nausea/Vomiting)  Qty: 30 tablet, Refills: 2    Associated Diagnoses: Malignant neoplasm of lower lobe of right lung (H)      thin (NO BRAND SPECIFIED) lancets Use with lanceting device. To accompany: Blood Glucose Monitor Brands: per insurance.  Qty: 100 each, Refills: 6    Associated Diagnoses: Type 2 diabetes mellitus with hyperglycemia, with long-term current use of insulin (H)             Allergies   Allergies   Allergen Reactions     Vicodin [Hydrocodone-Acetaminophen] Nausea and Vomiting and GI Disturbance     Data   Recent Labs   Lab 07/19/22  0630 07/18/22  0203   WBC 8.9 9.8   HGB 11.6* 12.7*   HCT 34.6* 37.6*   MCV 95 94    247     Recent Labs   Lab 07/19/22  1312 07/19/22  1239 07/19/22  0929 07/19/22  0630 07/18/22  0356 07/18/22  0203   NA  --   --   --  141  --  139   POTASSIUM 3.5  --   --  3.1*  --  3.0*   CHLORIDE  --   --   --  108  --  102   CO2  --   --   --  29  --  27   ANIONGAP  --   --   --  4  --  10   GLC  --  322* 271* 250*   < > 621*   BUN  --   --   --  11  --  13   CR  --   --   --  0.54*  --  0.66   GFRESTIMATED  --   --   --  >90  --  >90   TORY  --   --   --  8.0*  --  8.7   PROTTOTAL  --   --   --   --   --  6.4*   ALBUMIN  --   --   --   --   --  3.5   BILITOTAL  --   --   --   --   --  0.7   ALKPHOS  --   --   --   --   --  164*   AST  --   --   --   --   --  7   ALT  --   --   --   --   --  21    < > = values in this interval not displayed.       Results for orders placed or performed during the hospital encounter of 07/18/22   Head CT w/o contrast     Narrative    EXAM: CT HEAD W/O CONTRAST  LOCATION: [facility]  DATE/TIME: 7/18/2022 2:47 AM    INDICATION: Recent radiotherapy for known brain malignancy (metastatic lesion) with new seizure today.  COMPARISON: Brain MRI 06/29/2022.  TECHNIQUE: Routine CT Head without IV contrast. Multiplanar reformats. Dose reduction techniques were used.    FINDINGS:  INTRACRANIAL CONTENTS: Vasogenic edema left frontal lobe is grossly stable compared to the prior study. Previously seen underlying lesion on the brain MRI is much better appreciated on that study. Cystic area in the right orbital frontal region is   grossly stable. No definite new lesion or hemorrhage. Small focus of low-attenuation in the left cerebellar hemisphere with metastatic lesions seen on MRI. Other lesions are not well evaluated on CT. No CT evidence of acute infarct.    VISUALIZED ORBITS/SINUSES/MASTOIDS: No intraorbital abnormality. No paranasal sinus mucosal disease. No middle ear or mastoid effusion.    BONES/SOFT TISSUES: Previous left frontal craniotomy.      Impression    IMPRESSION:  1.  Vasogenic edema deep to the left frontal craniotomy is grossly stable. Underlying lesion is much better appreciated on previous MRI.    2.  Scattered additional lesions that are visualized are grossly unchanged. Multiple lesions seen on the previous MRI are not visible on this CT.    3.  No hemorrhage or recent infarct by CT.   Chest XR,  PA & LAT    Narrative    EXAM: XR CHEST 2 VW  LOCATION: [facility]  DATE/TIME: 7/18/2022 3:00 AM    INDICATION: Seizure. Cough.  COMPARISON: 6/1/2022.    FINDINGS: The heart size is normal. Mild atelectasis or scarring at the lung bases. The lungs are otherwise clear. No pneumothorax or pleural effusion.      Impression    IMPRESSION: Mild bibasilar atelectasis or scar.

## 2022-07-19 NOTE — PLAN OF CARE
Aox4, vitals within baseline. Last  and 377 upon discharge. Tele SR. PIV SL. K+ resulted 3.1 this morning and was replaced after provider notified and initiated K+ protocol. Up ad avni in room. Voiding spontaneously without difficulty. Seizure precautions maintained. Showered this shift independently. Will discharge today.     Reviewed discharge instructions and medications with patient. Questions answered. Patient discharged to home with spouse, discharge instructions, medications (decadron), and belongings.    PIV removed, tele removed, K+ recheck drawn right before discharge.

## 2022-07-19 NOTE — PLAN OF CARE
End of shift summary ([id number])    Pt Ox4. VSS on RA. Denied pain. . Tele SR, denied CP. PIV to left intact, SL. Up ad avni in room, steady gait, denies dizziness. Seizure precautions maintained. Neuro following. Will continue POC.     Goal Outcome Evaluation:    Plan of Care Reviewed With: patient     Overall Patient Progress: improving

## 2022-07-19 NOTE — TELEPHONE ENCOUNTER
[facility] Sent following msg to [facility].   Please contact Dr. Schaefer at  [phone].  MRN:  [id number]

## 2022-07-20 ENCOUNTER — PATIENT OUTREACH (OUTPATIENT)
Dept: CARE COORDINATION | Facility: CLINIC | Age: 42
End: 2022-07-20

## 2022-07-20 NOTE — PROGRESS NOTES
[facility] Resource Center Contact  [facility]/Voicemail     Clinical Data: Care Coordinator Outreach - TCM      Outreach attempted x 1.  Left message on patient's voicemail, providing [facility]'s 24/7 scheduling and nurse triage phone number [phone] ([phone]) for questions/concerns and/or to schedule an appt with an [facility] provider, if they do not have a PCP.      Plan:  [facility] will try to reach patient again in 1-2 business days.       Nicole Westbrook RN  [facility], [facility]    *Connected Care Resource Team does NOT follow patient ongoing. Referrals are identified based on internal discharge reports and the outreach is to ensure patient has an understanding of their discharge instructions.

## 2022-07-21 NOTE — PROGRESS NOTES
[facility] Care Resource Center Contact  [facility]/Voicemail     Clinical Data: Care Coordinator Outreach - TCM      Outreach attempted x 2.  Left message on patient's voicemail, providing [facility]'s 24/7 scheduling and nurse triage phone number [phone] ([phone]) for questions/concerns and/or to schedule an appt with an [facility] provider, if they do not have a PCP.      Plan:  [facility] will do no further outreaches at this time.       Nicole Westbrook RN  [facility] Resource [place], [facility]    *Connected Care Resource Team does NOT follow patient ongoing. Referrals are identified based on internal discharge reports and the outreach is to ensure patient has an understanding of their discharge instructions.

## 2022-07-26 ENCOUNTER — HOSPITAL ENCOUNTER (OUTPATIENT)
Dept: NEUROLOGY | Facility: CLINIC | Age: 42
Discharge: HOME | End: 2022-07-26
Attending: INTERNAL MEDICINE | Admitting: INTERNAL MEDICINE
Payer: COMMERCIAL

## 2022-07-26 ENCOUNTER — TELEPHONE (OUTPATIENT)
Dept: ONCOLOGY | Facility: CLINIC | Age: 42
End: 2022-07-26

## 2022-07-26 ENCOUNTER — VIRTUAL VISIT (OUTPATIENT)
Dept: RADIATION ONCOLOGY | Facility: HOSPITAL | Age: 42
End: 2022-07-26
Attending: CLINICAL NURSE SPECIALIST
Payer: COMMERCIAL

## 2022-07-26 DIAGNOSIS — R56.9 SEIZURE (H): ICD-10-CM

## 2022-07-26 DIAGNOSIS — C34.90 NON-SMALL CELL LUNG CANCER, UNSPECIFIED LATERALITY (H): ICD-10-CM

## 2022-07-26 PROCEDURE — 95816 EEG AWAKE AND DROWSY: CPT

## 2022-08-01 ENCOUNTER — OFFICE VISIT (OUTPATIENT)
Dept: NEUROSURGERY | Facility: CLINIC | Age: 42
End: 2022-08-01
Attending: NEUROLOGICAL SURGERY
Payer: COMMERCIAL

## 2022-08-01 ENCOUNTER — HOSPITAL ENCOUNTER (OUTPATIENT)
Dept: MRI IMAGING | Facility: CLINIC | Age: 42
Discharge: HOME | End: 2022-08-01
Attending: NEUROLOGICAL SURGERY
Payer: COMMERCIAL

## 2022-08-01 VITALS — DIASTOLIC BLOOD PRESSURE: 88 MMHG | OXYGEN SATURATION: 96 % | SYSTOLIC BLOOD PRESSURE: 149 MMHG | HEART RATE: 70 BPM

## 2022-08-01 DIAGNOSIS — I67.89 RADIATION THERAPY INDUCED BRAIN NECROSIS: ICD-10-CM
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
## 2022-08-01 DIAGNOSIS — G93.6 VASOGENIC CEREBRAL EDEMA (H): Primary | ICD-10-CM

## 2022-08-01 DIAGNOSIS — C79.31 BRAIN METASTASIS: ICD-10-CM

## 2022-08-01 PROCEDURE — 70553 MRI BRAIN STEM W/O & W/DYE: CPT

## 2022-08-01 PROCEDURE — 99024 POSTOP FOLLOW-UP VISIT: CPT | Performed by: NEUROLOGICAL SURGERY

## 2022-08-01 PROCEDURE — 255N000002 HC RX 255 OP 636: Performed by: NEUROLOGICAL SURGERY

## 2022-08-01 PROCEDURE — A9585 GADOBUTROL INJECTION: HCPCS | Performed by: NEUROLOGICAL SURGERY

## 2022-08-01 PROCEDURE — G0463 HOSPITAL OUTPT CLINIC VISIT: HCPCS

## 2022-08-01 RX ORDER — GADOBUTROL 604.72 MG/ML
10 INJECTION INTRAVENOUS ONCE
Status: COMPLETED | OUTPATIENT
Start: 2022-08-01 | End: 2022-08-01

## 2022-08-01 RX ORDER — DEXAMETHASONE 4 MG/1
4 TABLET ORAL
Qty: 30 TABLET | Refills: 1 | Status: SHIPPED | OUTPATIENT
Start: 2022-08-01 | End: 2022-09-14

## 2022-08-01 RX ADMIN — GADOBUTROL 10 ML: 604.72 INJECTION INTRAVENOUS at 10:12

## 2022-08-01 ASSESSMENT — PAIN SCALES - GENERAL: PAINLEVEL: NO PAIN (0)

## 2022-08-01 NOTE — LETTER
"    8/1/2022         RE: Connor Emerson  [address]  [place] [address]        Dear Colleague,    Thank you for referring your patient, Connor Emerson, to the [facility] [place]. Please see a copy of my visit note below.    It was a pleasure to see Connor Emerosn today in Neurosurgery Clinic. He is a 42 year old male who previously underwent radiosurgery for brain metastasis and had a craniotomy and Becca for what was either recurrent metastasis or radiation necrosis.    The pathology was consistent with radiation necrosis without evidence of recurrent tumor.  The patient was readmitted in mid July with seizure after issues with medications.  He returns today for follow-up.    He feels like his symptoms are returning with his tremor on the right side and worse word finding difficulty and speech problems.  These are similar to previous symptoms when his edema was worse.    Vitals:    08/01/22 1117   BP: (!) 149/88   Pulse: 70   SpO2: 96%     Estimated body mass index is 30.73 kg/m  as calculated from the following:    Height as of 7/14/22: 1.753 m (5' 9\").    Weight as of 7/18/22: 94.4 kg (208 lb 1.6 oz).  No Pain (0)    Awake alert and oriented  Well-healed cranial incision.  Right pronator drift.  Bilateral upper and lower extremity strength 5 out of 5 in all muscle groups.    Imaging: Updated MRI today shows worsening vasogenic cerebral edema around the enhancing lesion.  The imaging was reviewed with the patient shown to the patient clinic today.    Assessment: 1.  Status post craniotomy and radiosurgery.  2.  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    Plan: I have increased his dexamethasone to 4 mg daily.  Hopefully this will help improve some of his symptoms.  I instructed him to contact us if this does not help so that we might increase it more.    Given his pathology is radiation necrosis, I will reach out to Dr. Persaud to see if " Avastin might be an option given his diabetes and challenges with dexamethasone.  I will also reach out to Radha Shetty NP to let her know that we are increasing his dexamethasone which may require further adjustments of his insulin and diabetes management.  We will see him back in 6 weeks to see how he is doing.         Again, thank you for allowing me to participate in the care of your patient.        Sincerely,        Stephen Moran MD

## 2022-08-01 NOTE — PROGRESS NOTES
"It was a pleasure to see Connor Emerson today in Neurosurgery Clinic. He is a 42 year old male who previously underwent radiosurgery for brain metastasis and had a craniotomy and June for what was either recurrent metastasis or radiation necrosis.    The pathology was consistent with radiation necrosis without evidence of recurrent tumor.  The patient was readmitted in mid July with seizure after issues with medications.  He returns today for follow-up.    He feels like his symptoms are returning with his tremor on the right side and worse word finding difficulty and speech problems.  These are similar to previous symptoms when his edema was worse.    Vitals:    08/01/22 1117   BP: (!) 149/88   Pulse: 70   SpO2: 96%     Estimated body mass index is 30.73 kg/m  as calculated from the following:    Height as of 7/14/22: 1.753 m (5' 9\").    Weight as of 7/18/22: 94.4 kg (208 lb 1.6 oz).  No Pain (0)    Awake alert and oriented  Well-healed cranial incision.  Right pronator drift.  Bilateral upper and lower extremity strength 5 out of 5 in all muscle groups.    Imaging: Updated MRI today shows worsening vasogenic cerebral edema around the enhancing lesion.  The imaging was reviewed with the patient shown to the patient clinic today.    Assessment: 1.  Status post craniotomy and radiosurgery.  2.  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    Plan: I have increased his dexamethasone to 4 mg daily.  Hopefully this will help improve some of his symptoms.  I instructed him to contact us if this does not help so that we might increase it more.    Given his pathology is radiation necrosis, I will reach out to Dr. Persaud to see if Avastin might be an option given his diabetes and challenges with dexamethasone.  I will also reach out to Radha Shetty NP to let her know that we are increasing his dexamethasone which may require further adjustments of his insulin and diabetes management.  We will see him " back in 6 weeks to see how he is doing.

## 2022-08-04 ENCOUNTER — PATIENT OUTREACH (OUTPATIENT)
Dept: ONCOLOGY | Facility: CLINIC | Age: 42
End: 2022-08-04

## 2022-08-04 ENCOUNTER — MYC REFILL (OUTPATIENT)
Dept: FAMILY MEDICINE | Facility: CLINIC | Age: 42
End: 2022-08-04

## 2022-08-04 ENCOUNTER — TELEPHONE (OUTPATIENT)
Dept: ONCOLOGY | Facility: CLINIC | Age: 42
End: 2022-08-04

## 2022-08-04 DIAGNOSIS — C79.31 BRAIN METASTASIS: ICD-10-CM

## 2022-08-04 RX ORDER — OXYCODONE HYDROCHLORIDE 5 MG/1
5 TABLET ORAL EVERY 4 HOURS PRN
Qty: 40 TABLET | Refills: 0 | Status: CANCELLED | OUTPATIENT
Start: 2022-08-04

## 2022-08-04 NOTE — TELEPHONE ENCOUNTER
Oral Chemotherapy Monitoring Program    Subjective/Objective:  Connor Emerson is a 42 year old male contacted by phone for a follow-up visit for oral chemotherapy alectinib. oCnnor confirmed dose of 450 mg BID. He is adherent and has not missed any doses in the last 2 weeks. He has been experiencing some memory problems which is most likely due to brain metastasis and not chemotherapy. He denied any adverse effects including NVD and constipation. He has not started any new medications and was last hospitalized on 7/18 due to a seizure and vasogenic edema.     ORAL CHEMOTHERAPY 4/1/2022 4/18/2022 5/7/2022 5/16/2022 6/6/2022 6/15/2022 8/4/2022   Assessment Type Left Voicemail Lab Monitoring Chart Review Lab Monitoring;Monthly Follow up Monthly Follow up Lab Monitoring;Monthly Follow up Monthly Follow up   Diagnosis Code Non-Small Cell Lung Cancer Non-Small Cell Lung Cancer Non-Small Cell Lung Cancer Non-Small Cell Lung Cancer Non-Small Cell Lung Cancer Non-Small Cell Lung Cancer Non-Small Cell Lung Cancer   Providers Dr. Cori Persaud   [facility]   Drug Name Alecensa (alectinib) Alecensa (alectinib) Alecensa (alectinib) Alecensa (alectinib) Alecensa (alectinib) Alecensa (alectinib) Alecensa (alectinib)   Dose 450 mg 450 mg 450 mg 450 mg 450 mg 450 mg 450 mg   Current Schedule BID BID BID BID BID BID BID   Cycle Details - Continuous Continuous Continuous Continuous Continuous Continuous   Start Date of Last Cycle - 4/11/2022 - - - - -   Doses missed in last 2 weeks - 0 - 0 - - 0   Adherence Assessment - Adherent - Adherent - - Adherent   Adverse Effects - Fatigue - Fatigue - - No AE identified during assessment   Other (See Note for Details) - - - - - - -   Pharmacist intervention(other) - - - - - - -   Intervention(s) - - - - - - -   Any new drug interactions? - - - No - - -   Is  "the dose as ordered appropriate for the patient? - - - Yes - - -   Is the patient currently in pain? - - - - - - -   Does the patient feel the pain is currently being managed by a provider? - - - - - - -   Since the last time we talked, have you been hospitalized or used the emergency room? - - - No - - -       Last PHQ-2 Score on record:   PHQ-2 ( 1999 [employer]) 6/27/2022 2/24/2022   Q1: Little interest or pleasure in doing things 0 0   Q2: Feeling down, depressed or hopeless 0 0   PHQ-2 Score 0 0   PHQ-2 Total Score (12-17 Years)- Positive if 3 or more points; Administer PHQ-A if positive - -   Q1: Little interest or pleasure in doing things Not at all Not at all   Q2: Feeling down, depressed or hopeless Not at all Not at all   PHQ-2 Score 0 0       Vitals:  BP:   BP Readings from Last 1 Encounters:   08/01/22 (!) 149/88     Wt Readings from Last 1 Encounters:   07/18/22 94.4 kg (208 lb 1.6 oz)     Estimated body surface area is 2.14 meters squared as calculated from the following:    Height as of 7/14/22: 1.753 m (5' 9\").    Weight as of 7/18/22: 94.4 kg (208 lb 1.6 oz).    Labs:  _  Result Component Current Result Ref Range   Sodium 141 (7/19/2022) 133 - 144 mmol/L     _  Result Component Current Result Ref Range   Potassium 3.5 (7/19/2022) 3.4 - 5.3 mmol/L     _  Result Component Current Result Ref Range   Calcium 8.0 (L) (7/19/2022) 8.5 - 10.1 mg/dL     No results found for Mag within last 30 days.     No results found for Phos within last 30 days.     _  Result Component Current Result Ref Range   Albumin 3.5 (7/18/2022) 3.4 - 5.0 g/dL     _  Result Component Current Result Ref Range   Urea Nitrogen 11 (7/19/2022) 7 - 30 mg/dL     _  Result Component Current Result Ref Range   Creatinine 0.54 (L) (7/19/2022) 0.66 - 1.25 mg/dL     _  Result Component Current Result Ref Range   AST 7 (7/18/2022) 0 - 45 U/L     _  Result Component Current Result Ref Range   ALT 21 (7/18/2022) 0 - 70 U/L     _  Result Component " Current Result Ref Range   Bilirubin Total 0.7 (7/18/2022) 0.2 - 1.3 mg/dL     _  Result Component Current Result Ref Range   WBC Count 8.9 (7/19/2022) 4.0 - 11.0 10e3/uL     _  Result Component Current Result Ref Range   Hemoglobin 11.6 (L) (7/19/2022) 13.3 - 17.7 g/dL     _  Result Component Current Result Ref Range   Platelet Count 215 (7/19/2022) 150 - 450 10e3/uL     No results found for ANC within last 30 days.     _  Result Component Current Result Ref Range   Absolute Neutrophils 4.6 (7/18/2022) 1.6 - 8.3 10e3/uL      Assessment/Plan:  Patient is tolerating alectinib therapy and it should be continued as planned.     Follow-Up:  Provider visit and labs to be scheduled for August.     Refill Due:  4/21/23    Shraddha Morley  Pharmacy Intern  Oral Chemotherapy Monitoring Program   [facility]  [phone]

## 2022-08-04 NOTE — PROGRESS NOTES
"Called Pt to relay, Per Dr. Persaud, that \"I want to know if he is taking his alectinib and if he is mangaing his blood glucose well\"  VM full.  "

## 2022-08-09 ENCOUNTER — MYC REFILL (OUTPATIENT)
Dept: FAMILY MEDICINE | Facility: CLINIC | Age: 42
End: 2022-08-09

## 2022-08-09 ENCOUNTER — MYC MEDICAL ADVICE (OUTPATIENT)
Dept: NEUROSURGERY | Facility: CLINIC | Age: 42
End: 2022-08-09

## 2022-08-09 DIAGNOSIS — C79.31 BRAIN METASTASIS: ICD-10-CM

## 2022-08-09 NOTE — TELEPHONE ENCOUNTER
Contacting patient to discuss pain assessment over the phone for his refill request.     Attempted to call patient multiple times, unable to LM , VM is full. Will reach out to patient via [facility] to call our clinic.

## 2022-08-09 NOTE — TELEPHONE ENCOUNTER
Patient calling for a refill of oxycodone.     DOS: 6/28/22  Procedure: Left Stealth craniotomy with intraoperative SSEP phase reversal and resection of brain tumor with microdissection.  Surgeon: Dr. Moran  Weeks Post Op: 6 weeks    Current symptom(s): incision site tenderness/burning pain but denies s/s of infection site. Encouraged patient to send photo of incision site anyway.  Patient clarified he is not having true headaches just pain at incision site area.    Current pain management: applies ice frequently which helps, currently on decadron, states he is not taking tylenol. Advised him that its ok to take. He said he has tylenol on hand. Reports he Is out of oxy, he ran out a few days ago. Reports he only takes 2 tabs oxy prn per day.     Patient said he is doing ok and denies any new or worsening symptoms. denies any N/T, weakness, no bowel or bladder incontinence, no foot drop or drag, no saddle anesthesia, no new seizures, no audio or visual changes. Reports his speech has improved and right hand tremor is improving.       Last fill: 6/30/22 #40  Next visit: 10/3 with Dr Moran    Medication pended for your approval, if appropriate. Pharmacy verified.     Any patient questions or concerns: no    Informed patient request will be forwarded to care team.

## 2022-08-10 RX ORDER — OXYCODONE HYDROCHLORIDE 5 MG/1
5 TABLET ORAL EVERY 4 HOURS PRN
Qty: 40 TABLET | Refills: 0 | Status: SHIPPED | OUTPATIENT
Start: 2022-08-10 | End: 2022-09-07

## 2022-08-17 ENCOUNTER — VIRTUAL VISIT (OUTPATIENT)
Dept: ONCOLOGY | Facility: CLINIC | Age: 42
End: 2022-08-17
Attending: STUDENT IN AN ORGANIZED HEALTH CARE EDUCATION/TRAINING PROGRAM
Payer: COMMERCIAL
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
## 2022-08-17 DIAGNOSIS — C34.31 MALIGNANT NEOPLASM OF LOWER LOBE OF RIGHT LUNG (H): Primary | ICD-10-CM

## 2022-08-17 DIAGNOSIS — I67.89 RADIATION THERAPY INDUCED BRAIN NECROSIS: ICD-10-CM

## 2022-08-17 PROCEDURE — 99215 OFFICE O/P EST HI 40 MIN: CPT | Mod: 95 | Performed by: STUDENT IN AN ORGANIZED HEALTH CARE EDUCATION/TRAINING PROGRAM

## 2022-08-17 PROCEDURE — G0463 HOSPITAL OUTPT CLINIC VISIT: HCPCS | Mod: PN,RTG | Performed by: STUDENT IN AN ORGANIZED HEALTH CARE EDUCATION/TRAINING PROGRAM

## 2022-08-17 NOTE — PROGRESS NOTES
MEDICAL ONCOLOGY FOLLOW UP NOTE    PATIENT NAME: Connor Emerson  ENCOUNTER DATE: 8/17/2022    Care Team  Primary Oncologist: Bassam Persaud MD    REASON FOR CURRENT VISIT: F/u of lung cancer    HISTORY OF PRESENT ILLNESS:  Mr. Connor Emerson is a 41 year old  male who is a non-smoker with PMHx of T2DM, HTN with metastatic NSCLC comes for follow up     Oncologic Hx:    Diagnosis:   Stage IV NSCLC, Rt lung adenocarcinoma with metastasis to pleura, mediastinum , rt pleural effusion and brain diagnosed 1/2022 (AJCC 8th edition)  PD-L1 TPS 2-3% by [place]   NGS [facility] panel-EML4:ALK rearragement  NGS Guardant- GNAS R201H, KRAS K5E- No ALK    Treatment:   Current:  3/2/22- now- Alectinib 600 mg BID (Dose reduced to 450 mg BID due to grade 3 myalgias 3/21/22)  )  Past:  2/15/22- GK to 11 briain lesions    Intent of treatment: Palliative    Oncologic course:  1/19/22 to 1/22/22-Admitted to [facility] for 2 week progressive SOB secondary to have large rt sided pleural effusion, needing thoracentesis x2 (1.7L and 2.0 L removed), cytology positive for malignancy, adenocarcinoma.   1/26/22- Rt pleural mass biopsy-Dr. Agrawal--POSITIVE FOR ADENOCARCINOMA CONSISTENT WITH LUNG PRIMARY, admixed with mesothelial hyperplasia and inflammatory infiltrate (+ TTF-1 and CK 7;  negative  p40, calretinin and WT-1. PAX8 immunostain focal +). 4th thoracentesis done simultaneously - 3L approx removed.   2/1/22- PET/CT-Right lower lobe central infiltrative FDG avid 8.2 x 9.6 cm mass representing a primary lung adenocarcinoma. Ipsilateral right perihilar, bilateral pretracheal, subcarinal and superior mediastinal michele metastases. Contralateral mildly FDG avid few lung nodules are suspicious for contralateral metastasis. At least 3 intracranial metastases in the right frontal lobe, left frontal lobe and left cerebellar hemisphere. Nonspecific mild diffuse bone marrow uptake. Further evaluation with a spine MRI could be considered to rule out early marrow  infiltration. This could also be seen with red marrow conversion.  2/5/22-  Brain MRI- At least 9 intracranial metastases as detailed above. The dominant lesions involving the orbital right frontal lobe, the posterior left middle frontal gyrus, anterior right temporal lobe and in the left cerebellar hemisphere have surrounding moderate vasogenic type edema.  2/15/22- Saw Dr. Arango from [facility]- Rcd GK to 12 lesion in bran  2/16/22- Pleurex placement   3/2/22- Started Alectinib 600 mg BID  3/21/22- Dose reduced to 450 mg BID due to grade 3 myalgias and fatigue  4/2/22 to 4/5/22- Admitted at [facility] for- Severe sepsis due to MSSA infection of right PleurX catheter s/p removal- He presented with onset of pain at tube site starting 4/1; at arrival was tachycardic with leukocytosis (22.7) and elevated lactic acid (2.9).  CT chest showed fluid and stranding tracking outside the pleural space into chest wall along pleural catheter.  IR was consulted and removed catheter 4/2 with report of pustular drainage and tip culture growing MSSA.  Thoracic Surg was consulted who felt no surgical indication necessary given minimal pleural fluid and lack of any signs of abscess.  Initially treated with broad spectrum coverage for sepsis, narrowed to Ancef once sensitivities returned with plan to transition to cefadroxil for an additional 10 days at discharge per ID. Held drug 4/2 to 4/11 5/2/22- CT CAP- Overall, positive response to therapy with decreased size of right lower lobe and right pleural-based masses, pulmonary metastases, hilar and mediastinal lymphadenopathy. However, a single right posterior pleural-based mass has slightly increased in size since 2/24/2022. No metastatic disease in the abdomen and pelvis. Right Pleurx catheter has been removed. Trace right pleural effusion and right basilar atelectasis.  5/2/22- Brain MRI- The previously demonstrated brain metastases are mildly diminished in size versus to 2/5/2022.  The degree of edema is also diminished but not completely resolved. Probable trace amounts of intralesional bleeding demonstrated on the gradient sequence within the metastases. No definite new metastasis or progressive mass effect. No hydrocephalus or infarct.    6/15/22 to 6/17/22- Admitted at [facility]-with aphasia and word finding difficulty over last few weeks.  He presented to [facility] ED on 6/10 for evaluation of his symptoms. MRI brain showed multiple intracranial metastases, with interval enlargement of the dominant lesion within the left frontal lobe and increased surrounding vasogenic edema with 2 mm rightward shift of the septum pellucidum. Due to his worsening anxiety, he left AMA. His symptoms continued to progress to where he could not write at work so he decided to go to the ED for re-evaluation and treatment. Evaluated by NSGY, Rad Onc (radiaiton necrosis vs tumor progression).  6/16/22- MR Brain (6/16) shows multiple intracranial metastases, with interval enlargement  of the dominant lesion within the left frontal lobe and increased surrounding vasogenic edema with 2 mm rightward shift of the septum pellucidum.  6/16/22- - CT CAP shows slightly decreased size of right lower lobe and right pleural-based masses. No new pulmonary nodules or lymphadenopathy; No evidence of metastatic disease in the abdomen or pelvis.   6/28/22 to 6/30/22- Admitted at [facility]- Elective left Stealth craniotomy with resection of brain tumor due to ongoing symptoms. No intraoperative complications. EBL 50 ml.  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  7/5/22- Ct CAP- Right lower lobe low-density nodules are not significantly changed. A small left upper lobe pulmonary nodule is also unchanged. Trace pleural fluid on the right has increased slightly. No convincing evidence for metastatic disease in the abdomen or pelvis.  7/18/22 to 7/19/22- Admitted to [facility] for seizure- Reportedly was only taking once Keppra instead of  twice daily. Also resumed on dexamethasone 2 mg daily  8/1/22- Brain MRI- Redemonstrated postsurgical changes status post left frontoparietal Craniotomy. Interval increase in size of the dominant ring-enhancing lesion in the left posterior superior frontal lobe with increased moderate surrounding vasogenic edema and local mass effect resulting in narrowing of the supratentorial ventricular system. No significant midline shift/herniation at this time  8/1/22- Dex was increased to 4 mg daily by Dr. Moran    He works as a maintenance manger for apartment complexes    Interval Hx:  Patient is here today alone in the clinic for follow up.  He is on 450 mg BID of Alectinib. He is adherent and has not missed any doses in the last 2 weeks.  He c/o of some rt sided pain in the chest noticed 2 weeks, seems pleuritic in nature as it gets worse with cough or deep breathing, usually does not take any pain meds.  Overall symptoms of radiation necrosis are resolving, tremors resolved, speech is better and memory problems have improved. No weakness. He continues to take dexamethasone 4 mg daily. Last couple days., BS are now increased to 200-350 mg, after increasing the dex to  4mg.   He is managing the BS with his primary. He is currently taking 30 Units long acting, and takes novolog for mealtime. PCP is helping adjust the dose.   He feels like his symptoms are returning with his tremor on the right side and worse word finding difficulty and speech problems.  These are similar to previous symptoms when his edema was worse.  Has mild aches and pains in muscle  Has ongoing fatigue, but able to push himself to work daily  Breathing is stable, no cough, no fever or chills.  He continues to work daily,  independent of ADL and IADL    REVIEW OF SYSTEMS: 14 point ROS negative other than the symptoms noted above in the HPI.    Wt Readings from Last 4 Encounters:   07/18/22 94.4 kg (208 lb 1.6 oz)   07/14/22 91.6 kg (202 lb)   06/28/22  91.6 kg (202 lb)   06/27/22 92.4 kg (203 lb 9.6 oz)      Review of Systems:  A comprehensive ROS was performed and found to be negative or non-contributory with the exception of that noted in the HPI above.    Past Medical History:  GERD  Hypertension, not on medication  Type 2 diabetes mellitus, not on medications currently, previously on Metformin    Past Surgical History:  Past Surgical History:   Procedure Laterality Date     BRONCHOSCOPY RIGID OR FLEXIBLE W/TRANSENDOSCOPIC ENDOBRONCHIAL ULTRASOUND GUIDED Bilateral 1/26/2022    Procedure: Right BRONCHOSCOPY, FIBEROPTIC, endobronchial ultrasound, pleural biopsy;  Surgeon: Dallin Agrawal MD;  Location: [facility] OR     INJECT BLOCK MEDIAL BRANCH CERVICAL/THORACIC/LUMBAR       INSERT CHEST TUBE Right 2/16/2022    Procedure: INSERTION, CATHETER, INTERCOSTAL, FOR DRAINAGE;  Surgeon: Dallin Agrawal MD;  Location: [facility] GI     INSERT CHEST TUBE Right 3/9/2022    Procedure: INSERTION, CATHETER, INTERCOSTAL, FOR DRAINAGE;  Surgeon: Sushila Antonio MD;  Location: [facility] GI     IR CHEST TUBE REMOVAL TUNNELED RIGHT  4/2/2022     OPTICAL TRACKING SYSTEM CRANIOTOMY, EXCISE TUMOR, COMBINED Left 6/28/2022    Procedure: Left stealth craniotomy for tumor resection with motor mapping;  Surgeon: Stephen Moran MD;  Location:  OR     ORTHOPEDIC SURGERY      Ganesh. Rotator cuff repair.     PLEUROSCOPY N/A 1/26/2022    Procedure: Pleuroscopy with Pleural Biopsy;  Surgeon: Dallin Agrawal MD;  Location:  OR       Social History:  Lives with wife and 4 kids in [place]. Works as a  for an apartment complex in [place]. Exposure to household chemicals and . No significant exposure to asbestos. No signal exposure to benzene or similar chemicals. No significant smoking history-states that he smoked 1 to 2 cigarettes occasionally per month for about 2 years in college, non-smoking since then. No significant alcohol use history. No other recreational  substances. Good support system. Kids are 23, 19, 17 and 13.    Family History  Significant history for cancers on maternal side. Mother  of uterine cancer. 2 maternal uncles have possible metastatic melanoma.    Outpatient Medications:  Not currently taking any outpatient medications. Has been prescribed Metformin in the past.    Physical Exam:    There were no vitals taken for this visit.     General: alert and cooperative, sitting up in chair  HEENT: sclera anicteric, EOMI, MMM. Pupils equal and reactive.   Neck: supple, normal ROM, no lymph nodes palpable.   CV: RRR, no murmurs  Resp: Right chest Pleurx catheter, RL and RML diminished  GI: soft, non-tender, non-distended, bowel sounds present and normoactive  MSK: warm and well-perfused, normal tone  Skin: no rashes on limited exam, no jaundice  Neuro: Alert awake and oriented x4, strength is 5 on 5 throughout, sensations are grossly intact    Labs & Studies: I personally reviewed the following studies:  Most Recent 3 CBC's:  Recent Labs   Lab Test 22  0630 22  0203 22  1109   WBC 8.9 9.8 15.1*   HGB 11.6* 12.7* 15.4   MCV 95 94 91    247 231     Most Recent 3 BMP's:  Recent Labs   Lab Test 22  1312 22  1239 22  0929 22  0630 22  0356 22  0203 22  1109 22  0945   NA  --   --   --  141  --  139  --  137   POTASSIUM 3.5  --   --  3.1*  --  3.0*   < > 4.2   CHLORIDE  --   --   --  108  --  102  --  101   CO2  --   --   --  29  --  27  --  29   BUN  --   --   --  11  --  13  --  14   CR  --   --   --  0.54*  --  0.66  --  0.58*   ANIONGAP  --   --   --  4  --  10  --  7   TORY  --   --   --  8.0*  --  8.7  --  8.6   GLC  --  322* 271* 250*   < > 621*   < > 451*    < > = values in this interval not displayed.    Most Recent 2 LFT's:  Recent Labs   Lab Test 22  0203 22  1321   AST 7 13   ALT 21 20   ALKPHOS 164* 134   BILITOTAL 0.7 0.8    Most Recent TSH and T4:  Recent Labs   Lab  Test 01/19/22 2006   TSH 1.81     I reviewed the above labs today.    ASSESSMENT AND PLAN:  Connor Corrales is a 41-year-old male non-smoker with a past medical history of hypertension and diabetes who presented with several weeks of dyspnea and found to have right-sided pleural effusion and a large right lung hilar mass, status post bronchoscopy and EBUS on 1/26/2022, with tissue sampling demonstrating adenocarcinoma on histopathological analysis.     Stage IV NSCLC, Rt lung adenocarcinoma with metastasis to pleura, mediastinum , rt pleural effusion and brain diagnosed 1/2022 (AJCC 8th edition)  PD-L1 TPS 2-3% by [place]   NGS [facility] panel-EML4:ALK rearragement; chr2:[id number], chr2:[id number]  NGS Guardant- GNAS R201H, KRAS K5E- No ALK    Overall prognosis for ALK rearrangement lung cancer is median overall survival > 7 years based on the most recent update of the WINSOME study. He began Alectinib 600 mg BID 3/2/22 and unfortunately developed grade 3 myalgias which have improved with lowering the dose 450 mg BID. He was holding drug 4/2 to 4/11 due to MSSA infection from pleurex which is removed. Resumed at 450 mg BID, doing well, initial CT with good ME in the chest.  Unfortunately developed enlargement of one of the lesions in the brain and back in June but eventually turned out to be radiation necrosis (see below).  Continues to be on alectinib for 450 mg tolerating it okay except for fatigue.  Currently on steroids at 4 mg which is making his sugars uncontrollably high in the 200s to 350s.  Will therefore consider adding bevacizumab in addition to alectinib for controlling his radiation necrosis, which is shown to be pretty effective and there is good data to use a combination safely and several of references are outlined below.  Most recently has developed some right-sided pleuritic chest pain would like to know the etiology, therefore we will order a CT of the  chest    https://www.scienceScoop.itrect.com/science/article/pii/M4567210214824471  Https://www.ncbi.nlm.nih.gov/pmc/articles/QKW2664849/  Https://www.frontiersin.org/articles/10.3389/fonc.2021.899511/full  https://ascopubs.org/doi/full/10.1200/PO.20.60756    # Brain mets: Brain MRI with several brain mets, s/p GK to 11-12 brain mets. F/u Brain MRI in June was showing enlargement of the one of the lesions along with edema, therefore had to undergo craniotomy followed by resection, the final biopsy consistent with radiation necrosis.  Has been having issues with controlling the radiation necrosis and swelling requiring steroids, currently on 4 mg of dexamethasone which is managed by neurosurgery.  Most recently with edema had ongoing tremors and slurred speech.  Those have now resolved after increasing the dexamethasone to 4 mg, however he is always having a hard time controlling her sugars with the dexamethasone.  We have discussed with Dr. Moran about possibility using of bevacizumab for radiation necrosis so that we can wean off of the steroids and control the radiation necrosis.  --Will discuss with Dr. Moran on who should be ordering bevacizumab, plan for 15 mg/kg every 3 weeks    # T2 DM- A1c 10.1.  PTA Lantus 30 units bid and has novolog, recent increase in BS following increase in dex to 4 mg.   -F/u with PCP for BS , encouraged f/u with PCP for management of glucose and insulin      # Bilateral lateral hip pain- unclear cause, resolved.    #grade 3 myalgias: resolved.  onset ~2 weeks after starting alectinib. Now has improved with holding therapy and Dose reduction as above to 450 mg BID.    #grade 2 fatigue: monitor for now     #MSSA infection, Sepsis at pleurex site- resolved  # Pleural effusion: s/p pleurex palcement  2/16/22. Then on 4/2 right PleurX catheter s/p removal for severe sepsis due to MSSA infection.      # HTN- conitnue hydrochlorothiazide,  -Monitor BP at home and will f/u with PCP     #dry  eyes  #blurred vision  Continue artificial tears. Saw ophthalmology previosuly, changed prescription, mow improved.  No neuro sx.     #Psychiatry  # Situational Anxiety   - Feels more irritable lately. Declined referral to therapist and denies medication intervention for now. PCP can help manage this too if he has established relationship with her.     #COVID vaccine: No COVID vaccine on record, confirm at next visit    Overall plan  CT chest ASAP   RTC with me after CT next week      On the day of service  Chart review: 5 minutes  Visit duration: 30 minutes  Care coordination: 5 minutes    Bassam Persaud MD    Hematology, Oncology and Transplantation    Connor is a 42 year old who is being evaluated via a billable video visit.      Pt is exhausted even after 14-15 hours of sleep a day.    How would you like to obtain your AVS? MyChart  If the video visit is dropped, the invitation should be resent by: Text to cell phone: [phone]  Will anyone else be joining your video visit? No      Sravanthi VAZ    Video-Visit Details    Video Start Time: 11:20 AM    Type of service:  Video Visit    Video End Time:11:20 AM    Originating Location (pt. Location): Home    Distant Location (provider location):  [facility]     Platform used for Video Visit: Crowd Cast

## 2022-08-17 NOTE — LETTER
8/17/2022         RE: Connor Emerson  [address]  [facility] [address]        Dear Colleague,    Thank you for referring your patient, Connor Emerson, to the [facility]. Please see a copy of my visit note below.    MEDICAL ONCOLOGY FOLLOW UP NOTE    PATIENT NAME: Connor Emerson  ENCOUNTER DATE: 8/17/2022    Care Team  Primary Oncologist: Bassam Persaud MD    REASON FOR CURRENT VISIT: F/u of lung cancer    HISTORY OF PRESENT ILLNESS:  Mr. Connor Emerson is a 41 year old  male who is a non-smoker with PMHx of T2DM, HTN with metastatic NSCLC comes for follow up     Oncologic Hx:    Diagnosis:   Stage IV NSCLC, Rt lung adenocarcinoma with metastasis to pleura, mediastinum , rt pleural effusion and brain diagnosed 1/2022 (AJCC 8th edition)  PD-L1 TPS 2-3% by [place]   NGS [facility] panel-EML4:ALK rearragement  NGS Guardant- GNAS R201H, KRAS K5E- No ALK    Treatment:   Current:  3/2/22- now- Alectinib 600 mg BID (Dose reduced to 450 mg BID due to grade 3 myalgias 3/21/22)  )  Past:  2/15/22- GK to 11 briain lesions    Intent of treatment: Palliative    Oncologic course:  1/19/22 to 1/22/22-Admitted to [facility] for 2 week progressive SOB secondary to have large rt sided pleural effusion, needing thoracentesis x2 (1.7L and 2.0 L removed), cytology positive for malignancy, adenocarcinoma.   1/26/22- Rt pleural mass biopsy-Dr. Agrawal--POSITIVE FOR ADENOCARCINOMA CONSISTENT WITH LUNG PRIMARY, admixed with mesothelial hyperplasia and inflammatory infiltrate (+ TTF-1 and CK 7;  negative  p40, calretinin and WT-1. PAX8 immunostain focal +). 4th thoracentesis done simultaneously - 3L approx removed.   2/1/22- PET/CT-Right lower lobe central infiltrative FDG avid 8.2 x 9.6 cm mass representing a primary lung adenocarcinoma. Ipsilateral right perihilar, bilateral pretracheal, subcarinal and superior mediastinal michele metastases. Contralateral mildly FDG avid few lung nodules are suspicious for  contralateral metastasis. At least 3 intracranial metastases in the right frontal lobe, left frontal lobe and left cerebellar hemisphere. Nonspecific mild diffuse bone marrow uptake. Further evaluation with a spine MRI could be considered to rule out early marrow infiltration. This could also be seen with red marrow conversion.  2/5/22-  Brain MRI- At least 9 intracranial metastases as detailed above. The dominant lesions involving the orbital right frontal lobe, the posterior left middle frontal gyrus, anterior right temporal lobe and in the left cerebellar hemisphere have surrounding moderate vasogenic type edema.  2/15/22- Saw Dr. Arango from Rad Onc- Rcd GK to 12 lesion in bran  2/16/22- Pleurex placement   3/2/22- Started Alectinib 600 mg BID  3/21/22- Dose reduced to 450 mg BID due to grade 3 myalgias and fatigue  4/2/22 to 4/5/22- Admitted at [facility] for- Severe sepsis due to MSSA infection of right PleurX catheter s/p removal- He presented with onset of pain at tube site starting 4/1; at arrival was tachycardic with leukocytosis (22.7) and elevated lactic acid (2.9).  CT chest showed fluid and stranding tracking outside the pleural space into chest wall along pleural catheter.  IR was consulted and removed catheter 4/2 with report of pustular drainage and tip culture growing MSSA.  Thoracic Surg was consulted who felt no surgical indication necessary given minimal pleural fluid and lack of any signs of abscess.  Initially treated with broad spectrum coverage for sepsis, narrowed to Ancef once sensitivities returned with plan to transition to cefadroxil for an additional 10 days at discharge per ID. Held drug 4/2 to 4/11 5/2/22- CT CAP- Overall, positive response to therapy with decreased size of right lower lobe and right pleural-based masses, pulmonary metastases, hilar and mediastinal lymphadenopathy. However, a single right posterior pleural-based mass has slightly increased in size since 2/24/2022.  No metastatic disease in the abdomen and pelvis. Right Pleurx catheter has been removed. Trace right pleural effusion and right basilar atelectasis.  5/2/22- Brain MRI- The previously demonstrated brain metastases are mildly diminished in size versus to 2/5/2022. The degree of edema is also diminished but not completely resolved. Probable trace amounts of intralesional bleeding demonstrated on the gradient sequence within the metastases. No definite new metastasis or progressive mass effect. No hydrocephalus or infarct.    6/15/22 to 6/17/22- Admitted at [facility]-with aphasia and word finding difficulty over last few weeks.  He presented to [facility] ED on 6/10 for evaluation of his symptoms. MRI brain showed multiple intracranial metastases, with interval enlargement of the dominant lesion within the left frontal lobe and increased surrounding vasogenic edema with 2 mm rightward shift of the septum pellucidum. Due to his worsening anxiety, he left AMA. His symptoms continued to progress to where he could not write at work so he decided to go to the ED for re-evaluation and treatment. Evaluated by NSGY, Rad Onc (radiaiton necrosis vs tumor progression).  6/16/22- MR Brain (6/16) shows multiple intracranial metastases, with interval enlargement  of the dominant lesion within the left frontal lobe and increased surrounding vasogenic edema with 2 mm rightward shift of the septum pellucidum.  6/16/22- - CT CAP shows slightly decreased size of right lower lobe and right pleural-based masses. No new pulmonary nodules or lymphadenopathy; No evidence of metastatic disease in the abdomen or pelvis.   6/28/22 to 6/30/22- Admitted at [facility]- Elective left Stealth craniotomy with resection of brain tumor due to ongoing symptoms. No intraoperative complications. EBL 50 ml.  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  7/5/22- Ct CAP- Right lower lobe low-density nodules are not significantly changed. A small left upper lobe  pulmonary nodule is also unchanged. Trace pleural fluid on the right has increased slightly. No convincing evidence for metastatic disease in the abdomen or pelvis.  7/18/22 to 7/19/22- Admitted to [facility] for seizure- Reportedly was only taking once Keppra instead of twice daily. Also resumed on dexamethasone 2 mg daily  8/1/22- Brain MRI- Redemonstrated postsurgical changes status post left frontoparietal Craniotomy. Interval increase in size of the dominant ring-enhancing lesion in the left posterior superior frontal lobe with increased moderate surrounding vasogenic edema and local mass effect resulting in narrowing of the supratentorial ventricular system. No significant midline shift/herniation at this time  8/1/22- Dex was increased to 4 mg daily by Dr. Moran    He works as a maintenance manger for apartment complexes    Interval Hx:  Patient is here today alone in the clinic for follow up.  He is on 450 mg BID of Alectinib. He is adherent and has not missed any doses in the last 2 weeks.  He c/o of some rt sided pain in the chest noticed 2 weeks, seems pleuritic in nature as it gets worse with cough or deep breathing, usually does not take any pain meds.  Overall symptoms of radiation necrosis are resolving, tremors resolved, speech is better and memory problems have improved. No weakness. He continues to take dexamethasone 4 mg daily. Last couple days., BS are now increased to 200-350 mg, after increasing the dex to  4mg.   He is managing the BS with his primary. He is currently taking 30 Units long acting, and takes novolog for mealtime. PCP is helping adjust the dose.   He feels like his symptoms are returning with his tremor on the right side and worse word finding difficulty and speech problems.  These are similar to previous symptoms when his edema was worse.  Has mild aches and pains in muscle  Has ongoing fatigue, but able to push himself to work daily  Breathing is stable, no cough, no fever or  chills.  He continues to work daily,  independent of ADL and IADL    REVIEW OF SYSTEMS: 14 point ROS negative other than the symptoms noted above in the HPI.    Wt Readings from Last 4 Encounters:   07/18/22 94.4 kg (208 lb 1.6 oz)   07/14/22 91.6 kg (202 lb)   06/28/22 91.6 kg (202 lb)   06/27/22 92.4 kg (203 lb 9.6 oz)      Review of Systems:  A comprehensive ROS was performed and found to be negative or non-contributory with the exception of that noted in the HPI above.    Past Medical History:  GERD  Hypertension, not on medication  Type 2 diabetes mellitus, not on medications currently, previously on Metformin    Past Surgical History:  Past Surgical History:   Procedure Laterality Date     BRONCHOSCOPY RIGID OR FLEXIBLE W/TRANSENDOSCOPIC ENDOBRONCHIAL ULTRASOUND GUIDED Bilateral 1/26/2022    Procedure: Right BRONCHOSCOPY, FIBEROPTIC, endobronchial ultrasound, pleural biopsy;  Surgeon: Dallin Agrawal MD;  Location: [facility] OR     INJECT BLOCK MEDIAL BRANCH CERVICAL/THORACIC/LUMBAR       INSERT CHEST TUBE Right 2/16/2022    Procedure: INSERTION, CATHETER, INTERCOSTAL, FOR DRAINAGE;  Surgeon: Dallin Agrawal MD;  Location: [facility] GI     INSERT CHEST TUBE Right 3/9/2022    Procedure: INSERTION, CATHETER, INTERCOSTAL, FOR DRAINAGE;  Surgeon: Sushila Antonio MD;  Location: [facility] GI     IR CHEST TUBE REMOVAL TUNNELED RIGHT  4/2/2022     OPTICAL TRACKING SYSTEM CRANIOTOMY, EXCISE TUMOR, COMBINED Left 6/28/2022    Procedure: Left stealth craniotomy for tumor resection with motor mapping;  Surgeon: Stephen Moran MD;  Location:  OR     ORTHOPEDIC SURGERY      Ganesh. Rotator cuff repair.     PLEUROSCOPY N/A 1/26/2022    Procedure: Pleuroscopy with Pleural Biopsy;  Surgeon: Dallin Agrawal MD;  Location:  OR       Social History:  Lives with wife and 4 kids in [place]. Works as a  for an apartment complex in [place]. Exposure to household chemicals and . No significant exposure to  asbestos. No signal exposure to benzene or similar chemicals. No significant smoking history-states that he smoked 1 to 2 cigarettes occasionally per month for about 2 years in college, non-smoking since then. No significant alcohol use history. No other recreational substances. Good support system. Kids are 23, 19, 17 and 13.    Family History  Significant history for cancers on maternal side. Mother  of uterine cancer. 2 maternal uncles have possible metastatic melanoma.    Outpatient Medications:  Not currently taking any outpatient medications. Has been prescribed Metformin in the past.    Physical Exam:    There were no vitals taken for this visit.     General: alert and cooperative, sitting up in chair  HEENT: sclera anicteric, EOMI, MMM. Pupils equal and reactive.   Neck: supple, normal ROM, no lymph nodes palpable.   CV: RRR, no murmurs  Resp: Right chest Pleurx catheter, RL and RML diminished  GI: soft, non-tender, non-distended, bowel sounds present and normoactive  MSK: warm and well-perfused, normal tone  Skin: no rashes on limited exam, no jaundice  Neuro: Alert awake and oriented x4, strength is 5 on 5 throughout, sensations are grossly intact    Labs & Studies: I personally reviewed the following studies:  Most Recent 3 CBC's:  Recent Labs   Lab Test 22  0630 22  0203 22  1109   WBC 8.9 9.8 15.1*   HGB 11.6* 12.7* 15.4   MCV 95 94 91    247 231     Most Recent 3 BMP's:  Recent Labs   Lab Test 22  1312 22  1239 22  0929 22  0630 22  0356 22  0203 22  1109 22  0945   NA  --   --   --  141  --  139  --  137   POTASSIUM 3.5  --   --  3.1*  --  3.0*   < > 4.2   CHLORIDE  --   --   --  108  --  102  --  101   CO2  --   --   --  29  --  27  --  29   BUN  --   --   --  11  --  13  --  14   CR  --   --   --  0.54*  --  0.66  --  0.58*   ANIONGAP  --   --   --  4  --  10  --  7   TORY  --   --   --  8.0*  --  8.7  --  8.6   GLC  --   322* 271* 250*   < > 621*   < > 451*    < > = values in this interval not displayed.    Most Recent 2 LFT's:  Recent Labs   Lab Test 07/18/22  0203 06/14/22  1321   AST 7 13   ALT 21 20   ALKPHOS 164* 134   BILITOTAL 0.7 0.8    Most Recent TSH and T4:  Recent Labs   Lab Test 01/19/22 2006   TSH 1.81     I reviewed the above labs today.    ASSESSMENT AND PLAN:  Connor Corrales is a 41-year-old male non-smoker with a past medical history of hypertension and diabetes who presented with several weeks of dyspnea and found to have right-sided pleural effusion and a large right lung hilar mass, status post bronchoscopy and EBUS on 1/26/2022, with tissue sampling demonstrating adenocarcinoma on histopathological analysis.     Stage IV NSCLC, Rt lung adenocarcinoma with metastasis to pleura, mediastinum , rt pleural effusion and brain diagnosed 1/2022 (AJCC 8th edition)  PD-L1 TPS 2-3% by [place]   NGS [facility] panel-EML4:ALK rearragement; chr2:[id number], chr2:[id number]  NGS Guardant- GNAS R201H, KRAS K5E- No ALK    Overall prognosis for ALK rearrangement lung cancer is median overall survival > 7 years based on the most recent update of the WINSOME study. He began Alectinib 600 mg BID 3/2/22 and unfortunately developed grade 3 myalgias which have improved with lowering the dose 450 mg BID. He was holding drug 4/2 to 4/11 due to MSSA infection from pleurex which is removed. Resumed at 450 mg BID, doing well, initial CT with good ME in the chest.  Unfortunately developed enlargement of one of the lesions in the brain and back in June but eventually turned out to be radiation necrosis (see below).  Continues to be on alectinib for 450 mg tolerating it okay except for fatigue.  Currently on steroids at 4 mg which is making his sugars uncontrollably high in the 200s to 350s.  Will therefore consider adding bevacizumab in addition to alectinib for controlling his radiation necrosis, which is shown to be pretty effective and there is  good data to use a combination safely and several of references are outlined below.  Most recently has developed some right-sided pleuritic chest pain would like to know the etiology, therefore we will order a CT of the chest    https://www.science[employer].com/science/article/pii/[email or web address]  Https://www.ncbi.nlm.nih.gov/pmc/articles/ZGD6365474/  Https://www.frontiersin.org/articles/10.3389/fonc.2021.650961/full  https://ascopubs.org/doi/full/10.1200/PO.20.14375    # Brain mets: Brain MRI with several brain mets, s/p GK to 11-12 brain mets. F/u Brain MRI in June was showing enlargement of the one of the lesions along with edema, therefore had to undergo craniotomy followed by resection, the final biopsy consistent with radiation necrosis.  Has been having issues with controlling the radiation necrosis and swelling requiring steroids, currently on 4 mg of dexamethasone which is managed by neurosurgery.  Most recently with edema had ongoing tremors and slurred speech.  Those have now resolved after increasing the dexamethasone to 4 mg, however he is always having a hard time controlling her sugars with the dexamethasone.  We have discussed with Dr. Moran about possibility using of bevacizumab for radiation necrosis so that we can wean off of the steroids and control the radiation necrosis.  --Will discuss with Dr. Moran on who should be ordering bevacizumab, plan for 15 mg/kg every 3 weeks    # T2 DM- A1c 10.1.  PTA Lantus 30 units bid and has novolog, recent increase in BS following increase in dex to 4 mg.   -F/u with PCP for BS , encouraged f/u with PCP for management of glucose and insulin      # Bilateral lateral hip pain- unclear cause, resolved.    #grade 3 myalgias: resolved.  onset ~2 weeks after starting alectinib. Now has improved with holding therapy and Dose reduction as above to 450 mg BID.    #grade 2 fatigue: monitor for now     #MSSA infection, Sepsis at pleurex site- resolved  # Pleural effusion:  s/p pleurex palcement  2/16/22. Then on 4/2 right PleurX catheter s/p removal for severe sepsis due to MSSA infection.      # HTN- conitnue hydrochlorothiazide,  -Monitor BP at home and will f/u with PCP     #dry eyes  #blurred vision  Continue artificial tears. Saw ophthalmology previosuly, changed prescription, mow improved.  No neuro sx.     #Psychiatry  # Situational Anxiety   - Feels more irritable lately. Declined referral to therapist and denies medication intervention for now. PCP can help manage this too if he has established relationship with her.     #COVID vaccine: No COVID vaccine on record, confirm at next visit    Overall plan  CT chest ASAP   RTC with me after CT next week      On the day of service  Chart review: 5 minutes  Visit duration: 30 minutes  Care coordination: 5 minutes    Bassam Persaud MD    Hematology, Oncology and Transplantation    Connor is a 42 year old who is being evaluated via a billable video visit.      Pt is exhausted even after 14-15 hours of sleep a day.    How would you like to obtain your AVS? MyChart  If the video visit is dropped, the invitation should be resent by: Text to cell phone: [phone]  Will anyone else be joining your video visit? No      Sravanthi Olivera VF        Again, thank you for allowing me to participate in the care of your patient.      Sincerely,    Bassam Persaud MD

## 2022-08-21 ENCOUNTER — MYC MEDICAL ADVICE (OUTPATIENT)
Dept: PHARMACY | Facility: CLINIC | Age: 42
End: 2022-08-21

## 2022-08-22 NOTE — TELEPHONE ENCOUNTER
Called to set up appt- no ans, no vm.  Will also send [employer] message.    Alecia ACKERMAN  [facility]   [facility]

## 2022-08-22 NOTE — TELEPHONE ENCOUNTER
Please call patient to schedule MTM visit, can see [place] pharmacist too as another option as I have limited availability over the next few weeks.

## 2022-08-24 ENCOUNTER — MYC MEDICAL ADVICE (OUTPATIENT)
Dept: NEUROSURGERY | Facility: CLINIC | Age: 42
End: 2022-08-24

## 2022-08-24 DIAGNOSIS — C34.31 MALIGNANT NEOPLASM OF LOWER LOBE OF RIGHT LUNG (H): ICD-10-CM

## 2022-08-24 DIAGNOSIS — C79.31 BRAIN METASTASIS: ICD-10-CM

## 2022-08-24 DIAGNOSIS — R56.9 SEIZURES (H): Primary | ICD-10-CM

## 2022-08-24 RX ORDER — LEVETIRACETAM 1000 MG/1
1000 TABLET ORAL 2 TIMES DAILY
Qty: 60 TABLET | Refills: 0 | Status: SHIPPED | OUTPATIENT
Start: 2022-08-24 | End: 2022-09-28

## 2022-08-29 ENCOUNTER — OFFICE VISIT (OUTPATIENT)
Dept: FAMILY MEDICINE | Facility: CLINIC | Age: 42
End: 2022-08-29
Payer: COMMERCIAL

## 2022-08-29 VITALS
SYSTOLIC BLOOD PRESSURE: 127 MMHG | OXYGEN SATURATION: 96 % | DIASTOLIC BLOOD PRESSURE: 79 MMHG | WEIGHT: 217.4 LBS | HEART RATE: 75 BPM | TEMPERATURE: 97.7 F | BODY MASS INDEX: 32.1 KG/M2

## 2022-08-29 DIAGNOSIS — E11.8 DIABETES MELLITUS TYPE 2 WITH COMPLICATIONS (H): Primary | ICD-10-CM

## 2022-08-29 PROCEDURE — 99213 OFFICE O/P EST LOW 20 MIN: CPT | Performed by: NURSE PRACTITIONER

## 2022-08-29 ASSESSMENT — PAIN SCALES - GENERAL: PAINLEVEL: NO PAIN (0)

## 2022-08-29 NOTE — PROGRESS NOTES
"  Assessment & Plan     Type 2 diabetes mellitus without complication (H)  Needs update with MTM.  Pt agreeable.                 BMI:   Estimated body mass index is 32.1 kg/m  as calculated from the following:    Height as of 7/14/22: 1.753 m (5' 9\").    Weight as of this encounter: 98.6 kg (217 lb 6.4 oz).           No follow-ups on file.    Radha Shetty, JERRY CNP  [facility] Ryan is a 42 year old, presenting for the following health issues:  Diabetes      History of Present Illness       Diabetes:   He presents for follow up of diabetes.  He is checking home blood glucose three times daily. He checks blood glucose before meals.  Blood glucose is sometimes over 200 and never under 70. He is aware of hypoglycemia symptoms including lethargy. He is concerned about other.  He is not experiencing numbness or burning in feet, excessive thirst, blurry vision, weight changes or redness, sores or blisters on feet. The patient has had a diabetic eye exam in the last 12 months. Eye exam performed on [facility]. Location of last eye exam 3months ago.        He eats 0-1 servings of fruits and vegetables daily.He consumes 1 sweetened beverage(s) daily.He exercises with enough effort to increase his heart rate 30 to 60 minutes per day.  He exercises with enough effort to increase his heart rate 3 or less days per week.   He is taking medications regularly.       Anxiety has improved, but is still present.    Following treatment regimen.  He has noted his blood sugars have decreased, but with the addition of steroids they are again difficult to control.    Still having difficulty breathing, improved from previous visits.      Review of Systems   Constitutional, HEENT, cardiovascular, pulmonary, gi and gu systems are negative, except as otherwise noted.      Objective    /79 (BP Location: Right arm, Patient Position: Sitting, Cuff Size: Adult Large)   Pulse 75   Temp 97.7 "  F (36.5  C) (Oral)   Wt 98.6 kg (217 lb 6.4 oz)   SpO2 96%   BMI 32.10 kg/m    Body mass index is 32.1 kg/m .  Physical Exam   GENERAL: healthy, alert and no distress  RESP: lungs clear to auscultation - no rales, rhonchi or wheezes  CV: regular rate and rhythm, normal S1 S2, no S3 or S4, no murmur, click or rub, no peripheral edema and peripheral pulses strong  PSYCH: mentation appears normal, affect normal/bright    No results found for any visits on 08/29/22.                .  ..

## 2022-09-01 ENCOUNTER — LAB (OUTPATIENT)
Dept: LAB | Facility: CLINIC | Age: 42
End: 2022-09-01
Attending: STUDENT IN AN ORGANIZED HEALTH CARE EDUCATION/TRAINING PROGRAM
Payer: COMMERCIAL

## 2022-09-01 ENCOUNTER — TELEPHONE (OUTPATIENT)
Dept: ONCOLOGY | Facility: CLINIC | Age: 42
End: 2022-09-01

## 2022-09-01 ENCOUNTER — HOSPITAL ENCOUNTER (OUTPATIENT)
Dept: CT IMAGING | Facility: CLINIC | Age: 42
Discharge: HOME | End: 2022-09-01
Attending: STUDENT IN AN ORGANIZED HEALTH CARE EDUCATION/TRAINING PROGRAM
Payer: COMMERCIAL

## 2022-09-01 ENCOUNTER — INFUSION THERAPY VISIT (OUTPATIENT)
Dept: INFUSION THERAPY | Facility: CLINIC | Age: 42
End: 2022-09-01
Attending: STUDENT IN AN ORGANIZED HEALTH CARE EDUCATION/TRAINING PROGRAM
Payer: COMMERCIAL

## 2022-09-01 VITALS
DIASTOLIC BLOOD PRESSURE: 84 MMHG | RESPIRATION RATE: 16 BRPM | OXYGEN SATURATION: 96 % | SYSTOLIC BLOOD PRESSURE: 137 MMHG | TEMPERATURE: 98.4 F | HEART RATE: 71 BPM

## 2022-09-01 DIAGNOSIS — Z79.899 ENCOUNTER FOR LONG-TERM (CURRENT) USE OF MEDICATIONS: ICD-10-CM

## 2022-09-01 DIAGNOSIS — D53.8 OTHER SPECIFIED NUTRITIONAL ANEMIAS: Primary | ICD-10-CM

## 2022-09-01 DIAGNOSIS — C34.31 MALIGNANT NEOPLASM OF LOWER LOBE OF RIGHT LUNG (H): ICD-10-CM

## 2022-09-01 DIAGNOSIS — D50.9 IRON DEFICIENCY ANEMIA, UNSPECIFIED IRON DEFICIENCY ANEMIA TYPE: Primary | ICD-10-CM

## 2022-09-01 DIAGNOSIS — D50.9 IRON DEFICIENCY ANEMIA, UNSPECIFIED IRON DEFICIENCY ANEMIA TYPE: ICD-10-CM

## 2022-09-01 LAB
ABO/RH(D): NORMAL
ALBUMIN SERPL-MCNC: 2.9 G/DL (ref 3.4–5)
ALP SERPL-CCNC: 127 U/L (ref 40–150)
ALT SERPL W P-5'-P-CCNC: 31 U/L (ref 0–70)
ANION GAP SERPL CALCULATED.3IONS-SCNC: 6 MMOL/L (ref 3–14)
ANTIBODY SCREEN: NEGATIVE
AST SERPL W P-5'-P-CCNC: 7 U/L (ref 0–45)
BASOPHILS # BLD AUTO: 0 10E3/UL (ref 0–0.2)
BASOPHILS NFR BLD AUTO: 0 %
BILIRUB SERPL-MCNC: 0.6 MG/DL (ref 0.2–1.3)
BLD PROD TYP BPU: NORMAL
BLOOD COMPONENT TYPE: NORMAL
BUN SERPL-MCNC: 19 MG/DL (ref 7–30)
CALCIUM SERPL-MCNC: 8 MG/DL (ref 8.5–10.1)
CHLORIDE BLD-SCNC: 101 MMOL/L (ref 94–109)
CK SERPL-CCNC: 53 U/L (ref 30–300)
CO2 SERPL-SCNC: 30 MMOL/L (ref 20–32)
CODING SYSTEM: NORMAL
CREAT BLD-MCNC: 0.6 MG/DL (ref 0.7–1.3)
CREAT SERPL-MCNC: 0.56 MG/DL (ref 0.66–1.25)
CROSSMATCH: NORMAL
EOSINOPHIL # BLD AUTO: 0.1 10E3/UL (ref 0–0.7)
EOSINOPHIL NFR BLD AUTO: 2 %
ERYTHROCYTE [DISTWIDTH] IN BLOOD BY AUTOMATED COUNT: 15.1 % (ref 10–15)
FERRITIN SERPL-MCNC: 473 NG/ML (ref 26–388)
GFR SERPL CREATININE-BSD FRML MDRD: >60 ML/MIN/1.73M2
GFR SERPL CREATININE-BSD FRML MDRD: >90 ML/MIN/1.73M2
GLUCOSE BLD-MCNC: 430 MG/DL (ref 70–99)
HCT VFR BLD AUTO: 18.8 % (ref 40–53)
HGB BLD-MCNC: 6.2 G/DL (ref 13.3–17.7)
IMM GRANULOCYTES # BLD: 0.1 10E3/UL
IMM GRANULOCYTES NFR BLD: 2 %
IRON SATN MFR SERPL: 39 % (ref 15–46)
IRON SERPL-MCNC: 109 UG/DL (ref 35–180)
ISSUE DATE AND TIME: NORMAL
LYMPHOCYTES # BLD AUTO: 1.6 10E3/UL (ref 0.8–5.3)
LYMPHOCYTES NFR BLD AUTO: 40 %
MCH RBC QN AUTO: 33 PG (ref 26.5–33)
MCHC RBC AUTO-ENTMCNC: 33 G/DL (ref 31.5–36.5)
MCV RBC AUTO: 100 FL (ref 78–100)
MONOCYTES # BLD AUTO: 0.2 10E3/UL (ref 0–1.3)
MONOCYTES NFR BLD AUTO: 6 %
NEUTROPHILS # BLD AUTO: 2.1 10E3/UL (ref 1.6–8.3)
NEUTROPHILS NFR BLD AUTO: 50 %
NRBC # BLD AUTO: 0 10E3/UL
NRBC BLD AUTO-RTO: 1 /100
PLATELET # BLD AUTO: 126 10E3/UL (ref 150–450)
POTASSIUM BLD-SCNC: 3.5 MMOL/L (ref 3.4–5.3)
PROT SERPL-MCNC: 5.2 G/DL (ref 6.8–8.8)
RBC # BLD AUTO: 1.88 10E6/UL (ref 4.4–5.9)
SODIUM SERPL-SCNC: 137 MMOL/L (ref 133–144)
SPECIMEN EXPIRATION DATE: NORMAL
TIBC SERPL-MCNC: 279 UG/DL (ref 240–430)
UNIT ABO/RH: NORMAL
UNIT NUMBER: NORMAL
UNIT STATUS: NORMAL
UNIT TYPE ISBT: 5100
VIT B12 SERPL-MCNC: 521 PG/ML (ref 232–1245)
WBC # BLD AUTO: 4.1 10E3/UL (ref 4–11)

## 2022-09-01 PROCEDURE — 71260 CT THORAX DX C+: CPT

## 2022-09-01 PROCEDURE — P9016 RBC LEUKOCYTES REDUCED: HCPCS | Performed by: STUDENT IN AN ORGANIZED HEALTH CARE EDUCATION/TRAINING PROGRAM

## 2022-09-01 PROCEDURE — 250N000009 HC RX 250: Performed by: STUDENT IN AN ORGANIZED HEALTH CARE EDUCATION/TRAINING PROGRAM

## 2022-09-01 PROCEDURE — 82565 ASSAY OF CREATININE: CPT | Mod: 91

## 2022-09-01 PROCEDURE — 82607 VITAMIN B-12: CPT

## 2022-09-01 PROCEDURE — 82374 ASSAY BLOOD CARBON DIOXIDE: CPT

## 2022-09-01 PROCEDURE — 85025 COMPLETE CBC W/AUTO DIFF WBC: CPT

## 2022-09-01 PROCEDURE — 86901 BLOOD TYPING SEROLOGIC RH(D): CPT | Performed by: STUDENT IN AN ORGANIZED HEALTH CARE EDUCATION/TRAINING PROGRAM

## 2022-09-01 PROCEDURE — 36415 COLL VENOUS BLD VENIPUNCTURE: CPT

## 2022-09-01 PROCEDURE — 82550 ASSAY OF CK (CPK): CPT

## 2022-09-01 PROCEDURE — 36415 COLL VENOUS BLD VENIPUNCTURE: CPT | Performed by: STUDENT IN AN ORGANIZED HEALTH CARE EDUCATION/TRAINING PROGRAM

## 2022-09-01 PROCEDURE — 86923 COMPATIBILITY TEST ELECTRIC: CPT | Performed by: STUDENT IN AN ORGANIZED HEALTH CARE EDUCATION/TRAINING PROGRAM

## 2022-09-01 PROCEDURE — 82728 ASSAY OF FERRITIN: CPT

## 2022-09-01 PROCEDURE — 83550 IRON BINDING TEST: CPT

## 2022-09-01 PROCEDURE — 36430 TRANSFUSION BLD/BLD COMPNT: CPT

## 2022-09-01 PROCEDURE — 250N000011 HC RX IP 250 OP 636: Performed by: STUDENT IN AN ORGANIZED HEALTH CARE EDUCATION/TRAINING PROGRAM

## 2022-09-01 RX ORDER — IOPAMIDOL 755 MG/ML
106 INJECTION, SOLUTION INTRAVASCULAR ONCE
Status: COMPLETED | OUTPATIENT
Start: 2022-09-01 | End: 2022-09-01

## 2022-09-01 RX ORDER — HEPARIN SODIUM (PORCINE) LOCK FLUSH IV SOLN 100 UNIT/ML 100 UNIT/ML
5 SOLUTION INTRAVENOUS
Status: CANCELLED | OUTPATIENT
Start: 2022-09-01

## 2022-09-01 RX ORDER — HEPARIN SODIUM,PORCINE 10 UNIT/ML
5 VIAL (ML) INTRAVENOUS
Status: CANCELLED | OUTPATIENT
Start: 2022-09-01

## 2022-09-01 RX ADMIN — IOPAMIDOL 106 ML: 755 INJECTION, SOLUTION INTRAVENOUS at 08:15

## 2022-09-01 RX ADMIN — SODIUM CHLORIDE 70 ML: 9 INJECTION, SOLUTION INTRAVENOUS at 08:15

## 2022-09-01 ASSESSMENT — PAIN SCALES - GENERAL: PAINLEVEL: NO PAIN (0)

## 2022-09-01 NOTE — PROGRESS NOTES
Infusion Nursing Note:  Connor Emerson presents today for RBC.    Patient seen by provider today: No   present during visit today: Not Applicable.    Note: Patient oriented to infusion center, call light system, and room. Reviewed transfusion process with patient. Patient stated understanding, all questions answered.     Patient reports no changes in home medications since they were reviewed on 8/29/22, declined to review them individually today.     BP slightly elevated post transfusion, but within range of pre-transfusion readings. Patient reports that he has BP monitor at home. Writer encouraged patient to monitor his BP throughout the evening, and seek medical attention should it rise, or he becomes symptomatic. Patient stated understanding, all questions answered.     Patient sent message to [facility] to inquire if his provider would like more routine lab checks, since his Hgb was so low today. Patient states he will follow up with [facility] and schedule appointments as needed.     Intravenous Access:  Labs drawn without difficulty.  Peripheral IV placed.    Treatment Conditions:  Lab Results   Component Value Date    HGB 6.2 (LL) 09/01/2022    WBC 4.1 09/01/2022    ANEU 7.8 08/20/2016    ANEUTAUTO 2.1 09/01/2022     (L) 09/01/2022      Results reviewed, labs MET treatment parameters, ok to proceed with treatment.  Blood transfusion consent signed 9/1/22.    Post Infusion Assessment:  Patient tolerated infusion without incident.  Blood return noted pre and post infusion.  Site patent and intact, free from redness, edema or discomfort.  No evidence of extravasations.  Access discontinued per protocol.     Discharge Plan:   Discharge instructions reviewed with: Patient and friend.  Patient and/or family verbalized understanding of discharge instructions and all questions answered.  AVS to patient via [employer].  Patient will schedule next appointment as needed.    Patient discharged in stable condition  accompanied by: friend.  Departure Mode: Ambulatory.      Carol Carpenter RN

## 2022-09-01 NOTE — ORAL ONC MGMT
Oral Chemotherapy Monitoring Program  Lab Follow Up    Reviewed lab results from 9/1/22.    ORAL CHEMOTHERAPY 4/1/2022 4/18/2022 5/7/2022 5/16/2022 6/6/2022 6/15/2022 8/4/2022   Assessment Type Left Voicemail Lab Monitoring Chart Review Lab Monitoring;Monthly Follow up Monthly Follow up Lab Monitoring;Monthly Follow up Monthly Follow up   Diagnosis Code Non-Small Cell Lung Cancer Non-Small Cell Lung Cancer Non-Small Cell Lung Cancer Non-Small Cell Lung Cancer Non-Small Cell Lung Cancer Non-Small Cell Lung Cancer Non-Small Cell Lung Cancer   Providers Dr. Cori Persaud   [facility]   Drug Name Alecensa (alectinib) Alecensa (alectinib) Alecensa (alectinib) Alecensa (alectinib) Alecensa (alectinib) Alecensa (alectinib) Alecensa (alectinib)   Dose 450 mg 450 mg 450 mg 450 mg 450 mg 450 mg 450 mg   Current Schedule BID BID BID BID BID BID BID   Cycle Details - Continuous Continuous Continuous Continuous Continuous Continuous   Start Date of Last Cycle - 4/11/2022 - - - - -   Doses missed in last 2 weeks - 0 - 0 - - 0   Adherence Assessment - Adherent - Adherent - - Adherent   Adverse Effects - Fatigue - Fatigue - - No AE identified during assessment   Other (See Note for Details) - - - - - - -   Pharmacist intervention(other) - - - - - - -   Intervention(s) - - - - - - -   Any new drug interactions? - - - No - - -   Is the dose as ordered appropriate for the patient? - - - Yes - - -   Is the patient currently in pain? - - - - - - -   Does the patient feel the pain is currently being managed by a provider? - - - - - - -   Since the last time we talked, have you been hospitalized or used the emergency room? - - - No - - -       Labs:  _  Result Component Current Result Ref Range   Sodium 137 (9/1/2022) 133 - 144 mmol/L     _  Result Component Current Result Ref Range   Potassium 3.5  (9/1/2022) 3.4 - 5.3 mmol/L     _  Result Component Current Result Ref Range   Calcium 8.0 (L) (9/1/2022) 8.5 - 10.1 mg/dL     No results found for Mag within last 30 days.     No results found for Phos within last 30 days.     _  Result Component Current Result Ref Range   Albumin 2.9 (L) (9/1/2022) 3.4 - 5.0 g/dL     _  Result Component Current Result Ref Range   Urea Nitrogen 19 (9/1/2022) 7 - 30 mg/dL     _  Result Component Current Result Ref Range   Creatinine 0.56 (L) (9/1/2022) 0.66 - 1.25 mg/dL     _  Result Component Current Result Ref Range   AST 7 (9/1/2022) 0 - 45 U/L     _  Result Component Current Result Ref Range   ALT 31 (9/1/2022) 0 - 70 U/L     _  Result Component Current Result Ref Range   Bilirubin Total 0.6 (9/1/2022) 0.2 - 1.3 mg/dL     _  Result Component Current Result Ref Range   WBC Count 4.1 (9/1/2022) 4.0 - 11.0 10e3/uL     _  Result Component Current Result Ref Range   Hemoglobin 6.2 (LL) (9/1/2022) 13.3 - 17.7 g/dL     _  Result Component Current Result Ref Range   Platelet Count 126 (L) (9/1/2022) 150 - 450 10e3/uL     No results found for ANC within last 30 days.     _  Result Component Current Result Ref Range   Absolute Neutrophils 2.1 (9/1/2022) 1.6 - 8.3 10e3/uL        Assessment & Plan:  Results are concerning for Grade 3 Anemia - patient directed to [facility] for blood transfusion today.    Unable to get a hold of patient or leave a message but sent [employer] message with update that Dr Persaud would like Connor to hold the Alecensa until Hemoglobin improves and it can be determined why his hemoglobin dropped so low.      Follow-Up:  Check for provider visit to be scheduled    Vanessa Chua, PharmD, BCPS  Oral Chemotherapy Monitoring Program  [facility]  [phone]

## 2022-09-01 NOTE — TELEPHONE ENCOUNTER
"DATE:  9/1/22  TIME OF RECEIPT FROM LAB:  0905   SITUATION: LAB TEST & VALUE is  Hemoglobin 6.2  Platelets  126  WBC 4.1  BACKGROUND: Dx right lung cancer  Previous Lab Values from Date: not available  RESULTS PAGED TO (PROVIDER):  Dr. Persaud  TIME LAB VALUE REPORTED TO PROVIDER: 0909     RECOMMENDATION:    FOLLOW-UP PLAN:    0911 this writer called pt, Pt states is \"so fatigued and was not understanding why so exhausted, getting difficult out of bed, constantly sleeping.\"     Pt is drinking a large volume of water \"like crazy\".    Pt has safe transportation if needs to go somewhere for blood transfusion and is familiar with [facility] as came from labs.   Instructed patient to seek care immediately for worsening symptoms, including: fever, chest pain, shortness of breath, dizziness, uncontrolled bleeding, headaches.       0927 Per Dr. Persaud see if any transfusion appts available today to txt outpatient. If pt actively bleeding send to emergency room.     As of 0940, Spoke to Luciana TEAGUE in [facility], no appts today 9/1/22, (Soonest availability would be Saturday 9/3/220)    0944 Spoke to Ester in [facility] could possible do T&S and 1 unit of RBC, Needs orders, consent, earliest at 12:30pm.     1000 Blood consent done with Maranda Bryant PA-C and Amber Scheierl CNP.     1005 Confirmed with Ester TEAGUE in [facility] for 12:30pm with T&S and Transfusion for 1 Unit of RBC, Fax to [phone]        Verified plan with the patient, who verbalized will be at [facility] at 12;30pm today.             "

## 2022-09-07 ENCOUNTER — MYC REFILL (OUTPATIENT)
Dept: FAMILY MEDICINE | Facility: CLINIC | Age: 42
End: 2022-09-07

## 2022-09-07 DIAGNOSIS — C79.31 BRAIN METASTASIS: ICD-10-CM

## 2022-09-08 RX ORDER — OXYCODONE HYDROCHLORIDE 5 MG/1
5 TABLET ORAL EVERY 4 HOURS PRN
Qty: 40 TABLET | Refills: 0 | Status: ON HOLD | OUTPATIENT
Start: 2022-09-08 | End: 2022-11-09

## 2022-09-08 NOTE — TELEPHONE ENCOUNTER
Patient requesting refill of Oxycodone.     DOS: 6/28/22  Procedure: Left Stealth craniotomy with intraoperative SSEP phase reversal and resection of brain tumor with microdissection.  Surgeon: Dr. Moran  Weeks Post Op: 10 weeks 2 days   Last refilled: 8/10 #40    Pain assessment completed via My Chart. Please see encounter for further details. Refill request will be forwarded to care team.     Per Alex Oliveros PA-C this will be patients final refill, updated patient via [facility].

## 2022-09-12 ENCOUNTER — APPOINTMENT (OUTPATIENT)
Dept: LAB | Facility: CLINIC | Age: 42
End: 2022-09-12
Attending: NURSE PRACTITIONER
Payer: COMMERCIAL

## 2022-09-12 ENCOUNTER — ONCOLOGY VISIT (OUTPATIENT)
Dept: ONCOLOGY | Facility: CLINIC | Age: 42
End: 2022-09-12
Attending: NURSE PRACTITIONER
Payer: COMMERCIAL

## 2022-09-12 VITALS
TEMPERATURE: 98 F | BODY MASS INDEX: 32.19 KG/M2 | HEART RATE: 71 BPM | DIASTOLIC BLOOD PRESSURE: 88 MMHG | OXYGEN SATURATION: 98 % | WEIGHT: 218 LBS | SYSTOLIC BLOOD PRESSURE: 136 MMHG | RESPIRATION RATE: 16 BRPM

## 2022-09-12 DIAGNOSIS — Z13.29 SCREENING FOR HYPOTHYROIDISM: ICD-10-CM

## 2022-09-12 DIAGNOSIS — Z79.899 ENCOUNTER FOR LONG-TERM (CURRENT) USE OF MEDICATIONS: ICD-10-CM

## 2022-09-12 DIAGNOSIS — Z11.4 SCREENING FOR HIV (HUMAN IMMUNODEFICIENCY VIRUS): ICD-10-CM

## 2022-09-12 DIAGNOSIS — I10 PRIMARY HYPERTENSION: ICD-10-CM

## 2022-09-12 DIAGNOSIS — C34.31 MALIGNANT NEOPLASM OF LOWER LOBE OF RIGHT LUNG (H): Primary | ICD-10-CM

## 2022-09-12 DIAGNOSIS — D64.9 NORMOCYTIC ANEMIA: ICD-10-CM

## 2022-09-12 DIAGNOSIS — Z11.59 NEED FOR HEPATITIS C SCREENING TEST: ICD-10-CM

## 2022-09-12 LAB
ALBUMIN SERPL BCG-MCNC: 4.1 G/DL (ref 3.5–5.2)
ALP SERPL-CCNC: 105 U/L (ref 40–129)
ALT SERPL W P-5'-P-CCNC: 31 U/L (ref 10–50)
ANION GAP SERPL CALCULATED.3IONS-SCNC: 12 MMOL/L (ref 7–15)
AST SERPL W P-5'-P-CCNC: 20 U/L (ref 10–50)
BASOPHILS # BLD AUTO: 0.1 10E3/UL (ref 0–0.2)
BASOPHILS NFR BLD AUTO: 0 %
BILIRUB SERPL-MCNC: 0.7 MG/DL
BUN SERPL-MCNC: 20.3 MG/DL (ref 6–20)
CALCIUM SERPL-MCNC: 9.7 MG/DL (ref 8.6–10)
CHLORIDE SERPL-SCNC: 100 MMOL/L (ref 98–107)
CK SERPL-CCNC: 61 U/L (ref 39–308)
CREAT SERPL-MCNC: 0.66 MG/DL (ref 0.67–1.17)
DEPRECATED HCO3 PLAS-SCNC: 28 MMOL/L (ref 22–29)
EOSINOPHIL # BLD AUTO: 0.1 10E3/UL (ref 0–0.7)
EOSINOPHIL NFR BLD AUTO: 1 %
ERYTHROCYTE [DISTWIDTH] IN BLOOD BY AUTOMATED COUNT: 14.1 % (ref 10–15)
GFR SERPL CREATININE-BSD FRML MDRD: >90 ML/MIN/1.73M2
GLUCOSE SERPL-MCNC: 374 MG/DL (ref 70–99)
HCT VFR BLD AUTO: 41.1 % (ref 40–53)
HGB BLD-MCNC: 14.4 G/DL (ref 13.3–17.7)
IMM GRANULOCYTES # BLD: 0.1 10E3/UL
IMM GRANULOCYTES NFR BLD: 0 %
LYMPHOCYTES # BLD AUTO: 4.2 10E3/UL (ref 0.8–5.3)
LYMPHOCYTES NFR BLD AUTO: 31 %
MCH RBC QN AUTO: 33 PG (ref 26.5–33)
MCHC RBC AUTO-ENTMCNC: 35 G/DL (ref 31.5–36.5)
MCV RBC AUTO: 94 FL (ref 78–100)
MONOCYTES # BLD AUTO: 0.8 10E3/UL (ref 0–1.3)
MONOCYTES NFR BLD AUTO: 6 %
NEUTROPHILS # BLD AUTO: 8.6 10E3/UL (ref 1.6–8.3)
NEUTROPHILS NFR BLD AUTO: 62 %
NRBC # BLD AUTO: 0 10E3/UL
NRBC BLD AUTO-RTO: 0 /100
PLATELET # BLD AUTO: 252 10E3/UL (ref 150–450)
POTASSIUM SERPL-SCNC: 3.6 MMOL/L (ref 3.4–5.3)
PROT SERPL-MCNC: 6 G/DL (ref 6.4–8.3)
RBC # BLD AUTO: 4.36 10E6/UL (ref 4.4–5.9)
RETICS # AUTO: 0.13 10E6/UL (ref 0.03–0.1)
RETICS/RBC NFR AUTO: 3 % (ref 0.5–2)
SODIUM SERPL-SCNC: 140 MMOL/L (ref 136–145)
TSH SERPL DL<=0.005 MIU/L-ACNC: 2.56 UIU/ML (ref 0.3–4.2)
WBC # BLD AUTO: 13.8 10E3/UL (ref 4–11)

## 2022-09-12 PROCEDURE — 80053 COMPREHEN METABOLIC PANEL: CPT | Performed by: NURSE PRACTITIONER

## 2022-09-12 PROCEDURE — 87389 HIV-1 AG W/HIV-1&-2 AB AG IA: CPT | Performed by: NURSE PRACTITIONER

## 2022-09-12 PROCEDURE — 85025 COMPLETE CBC W/AUTO DIFF WBC: CPT | Performed by: NURSE PRACTITIONER

## 2022-09-12 PROCEDURE — 85045 AUTOMATED RETICULOCYTE COUNT: CPT | Performed by: NURSE PRACTITIONER

## 2022-09-12 PROCEDURE — 36415 COLL VENOUS BLD VENIPUNCTURE: CPT | Performed by: NURSE PRACTITIONER

## 2022-09-12 PROCEDURE — 82040 ASSAY OF SERUM ALBUMIN: CPT | Performed by: NURSE PRACTITIONER

## 2022-09-12 PROCEDURE — G0463 HOSPITAL OUTPT CLINIC VISIT: HCPCS

## 2022-09-12 PROCEDURE — 82550 ASSAY OF CK (CPK): CPT | Performed by: NURSE PRACTITIONER

## 2022-09-12 PROCEDURE — 99215 OFFICE O/P EST HI 40 MIN: CPT | Mod: 24 | Performed by: NURSE PRACTITIONER

## 2022-09-12 PROCEDURE — 86803 HEPATITIS C AB TEST: CPT | Performed by: NURSE PRACTITIONER

## 2022-09-12 PROCEDURE — 84443 ASSAY THYROID STIM HORMONE: CPT | Performed by: NURSE PRACTITIONER

## 2022-09-12 RX ORDER — HYDROCHLOROTHIAZIDE 25 MG/1
25 TABLET ORAL DAILY
Qty: 30 TABLET | Refills: 3 | Status: SHIPPED | OUTPATIENT
Start: 2022-09-12 | End: 2023-04-06

## 2022-09-12 ASSESSMENT — PAIN SCALES - GENERAL: PAINLEVEL: NO PAIN (0)

## 2022-09-12 NOTE — NURSING NOTE
"Oncology Rooming Note    September 12, 2022 4:47 PM   Connor Emerson is a 42 year old male who presents for:    Chief Complaint   Patient presents with     Blood Draw     Labs drawn by RN via , vitals taken.     Initial Vitals: /88   Pulse 71   Temp 98  F (36.7  C)   Resp 16   Wt 98.9 kg (218 lb)   SpO2 98%   BMI 32.19 kg/m   Estimated body mass index is 32.19 kg/m  as calculated from the following:    Height as of 7/14/22: 1.753 m (5' 9\").    Weight as of this encounter: 98.9 kg (218 lb). Body surface area is 2.19 meters squared.  No Pain (0) Comment: Data Unavailable   No LMP for male patient.  Allergies reviewed: Yes  Medications reviewed: Yes    Medications: Medication refills not needed today.  Pharmacy name entered into EPIC:    [facility] [place] - [place], [place] - [address] [facility] [place] - [address] [place] [address]  [facility], [place] - [address] [id number] - [place], [place] - [address] [facility] AT [place] [address] & [facility]    Clinical concerns: Pt would like to know how chemo would go while also using steroids, and would like to know if he should start back on chemo today. Chelsea was notified.      Flakito Lay            "

## 2022-09-12 NOTE — NURSING NOTE
Chief Complaint   Patient presents with     Blood Draw     Labs drawn by RN via , vitals taken.     Labs collected from venipuncture by RN. Vitals taken. Checked in for appointment(s).    Ava Norman RN

## 2022-09-12 NOTE — PROGRESS NOTES
MEDICAL ONCOLOGY FOLLOW UP NOTE    PATIENT NAME: Connor Emerson  ENCOUNTER DATE: 9/12/2022    Care Team  Primary Oncologist: Bassam Persaud MD    REASON FOR CURRENT VISIT: F/u of lung cancer    HISTORY OF PRESENT ILLNESS:  Mr. Connor Emerson is a 42 year old  male who is a non-smoker with PMHx of T2DM, HTN with metastatic NSCLC comes for follow up     Oncologic Hx:    Diagnosis:   Stage IV NSCLC, Rt lung adenocarcinoma with metastasis to pleura, mediastinum , rt pleural effusion and brain diagnosed 1/2022 (AJCC 8th edition)  PD-L1 TPS 2-3% by [facility]   NGS [facility] panel-EML4:ALK rearragement  NGS Guardant- GNAS R201H, KRAS K5E- No ALK    Treatment:   Current:  3/2/22- now- Alectinib 600 mg BID (Dose reduced to 450 mg BID due to grade 3 myalgias 3/21/22)  )  Past:  2/15/22- GK to 11 briain lesions    Intent of treatment: Palliative    Oncologic course:  1/19/22 to 1/22/22-Admitted to [facility] for 2 week progressive SOB secondary to have large rt sided pleural effusion, needing thoracentesis x2 (1.7L and 2.0 L removed), cytology positive for malignancy, adenocarcinoma.   1/26/22- Rt pleural mass biopsy-Dr. Agrawal--POSITIVE FOR ADENOCARCINOMA CONSISTENT WITH LUNG PRIMARY, admixed with mesothelial hyperplasia and inflammatory infiltrate (+ TTF-1 and CK 7;  negative  p40, calretinin and WT-1. PAX8 immunostain focal +). 4th thoracentesis done simultaneously - 3L approx removed.   2/1/22- PET/CT-Right lower lobe central infiltrative FDG avid 8.2 x 9.6 cm mass representing a primary lung adenocarcinoma. Ipsilateral right perihilar, bilateral pretracheal, subcarinal and superior mediastinal michele metastases. Contralateral mildly FDG avid few lung nodules are suspicious for contralateral metastasis. At least 3 intracranial metastases in the right frontal lobe, left frontal lobe and left cerebellar hemisphere. Nonspecific mild diffuse bone marrow uptake. Further evaluation with a spine MRI could be considered to rule out early marrow  infiltration. This could also be seen with red marrow conversion.  2/5/22-  Brain MRI- At least 9 intracranial metastases as detailed above. The dominant lesions involving the orbital right frontal lobe, the posterior left middle frontal gyrus, anterior right temporal lobe and in the left cerebellar hemisphere have surrounding moderate vasogenic type edema.  2/15/22- Saw Dr. Arango from [facility]- Rcd GK to 12 lesion in bran  2/16/22- Pleurex placement   3/2/22- Started Alectinib 600 mg BID  3/21/22- Dose reduced to 450 mg BID due to grade 3 myalgias and fatigue  4/2/22 to 4/5/22- Admitted at [facility] for- Severe sepsis due to MSSA infection of right PleurX catheter s/p removal- He presented with onset of pain at tube site starting 4/1; at arrival was tachycardic with leukocytosis (22.7) and elevated lactic acid (2.9).  CT chest showed fluid and stranding tracking outside the pleural space into chest wall along pleural catheter.  IR was consulted and removed catheter 4/2 with report of pustular drainage and tip culture growing MSSA.  Thoracic Surg was consulted who felt no surgical indication necessary given minimal pleural fluid and lack of any signs of abscess.  Initially treated with broad spectrum coverage for sepsis, narrowed to Ancef once sensitivities returned with plan to transition to cefadroxil for an additional 10 days at discharge per ID. Held drug 4/2 to 4/11 5/2/22- CT CAP- Overall, positive response to therapy with decreased size of right lower lobe and right pleural-based masses, pulmonary metastases, hilar and mediastinal lymphadenopathy. However, a single right posterior pleural-based mass has slightly increased in size since 2/24/2022. No metastatic disease in the abdomen and pelvis. Right Pleurx catheter has been removed. Trace right pleural effusion and right basilar atelectasis.  5/2/22- Brain MRI- The previously demonstrated brain metastases are mildly diminished in size versus to 2/5/2022.  The degree of edema is also diminished but not completely resolved. Probable trace amounts of intralesional bleeding demonstrated on the gradient sequence within the metastases. No definite new metastasis or progressive mass effect. No hydrocephalus or infarct.    6/15/22 to 6/17/22- Admitted at [facility]-with aphasia and word finding difficulty over last few weeks.  He presented to [facility] ED on 6/10 for evaluation of his symptoms. MRI brain showed multiple intracranial metastases, with interval enlargement of the dominant lesion within the left frontal lobe and increased surrounding vasogenic edema with 2 mm rightward shift of the septum pellucidum. Due to his worsening anxiety, he left AMA. His symptoms continued to progress to where he could not write at work so he decided to go to the ED for re-evaluation and treatment. Evaluated by NSGY, Rad Onc (radiaiton necrosis vs tumor progression).  6/16/22- MR Brain (6/16) shows multiple intracranial metastases, with interval enlargement  of the dominant lesion within the left frontal lobe and increased surrounding vasogenic edema with 2 mm rightward shift of the septum pellucidum.  6/16/22- - CT CAP shows slightly decreased size of right lower lobe and right pleural-based masses. No new pulmonary nodules or lymphadenopathy; No evidence of metastatic disease in the abdomen or pelvis.   6/28/22 to 6/30/22- Admitted at [facility]- Elective left Stealth craniotomy with resection of brain tumor due to ongoing symptoms. No intraoperative complications. EBL 50 ml.  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  7/5/22- Ct CAP- Right lower lobe low-density nodules are not significantly changed. A small left upper lobe pulmonary nodule is also unchanged. Trace pleural fluid on the right has increased slightly. No convincing evidence for metastatic disease in the abdomen or pelvis.  7/18/22 to 7/19/22- Admitted to [facility] for seizure- Reportedly was only taking once Keppra instead of  twice daily. Also resumed on dexamethasone 2 mg daily  8/1/22- Brain MRI- Redemonstrated postsurgical changes status post left frontoparietal Craniotomy. Interval increase in size of the dominant ring-enhancing lesion in the left posterior superior frontal lobe with increased moderate surrounding vasogenic edema and local mass effect resulting in narrowing of the supratentorial ventricular system. No significant midline shift/herniation at this time  8/1/22- Dex was increased to 4 mg daily by Dr. Moran    He works as a maintenance manger for apartment complexes    Interval Hx:  Held alectinib since last Thursday  Still tired, dragging  Not sleeping great at night  No dark/tarry stools   Sugars at home high in morning--250's--400 at HS  Takes lantus and novolog, recently met with PCP  Still occasional right pleuretic chest pain  He continues to take dexamethasone 4 mg daily, no new neuro sx  Breathing is stable, no cough, no fever or chills.  He continues to work daily,  independent of ADL and IADL    REVIEW OF SYSTEMS: 14 point ROS negative other than the symptoms noted above in the HPI.    Wt Readings from Last 4 Encounters:   08/29/22 98.6 kg (217 lb 6.4 oz)   07/18/22 94.4 kg (208 lb 1.6 oz)   07/14/22 91.6 kg (202 lb)   06/28/22 91.6 kg (202 lb)      Review of Systems:  A comprehensive ROS was performed and found to be negative or non-contributory with the exception of that noted in the HPI above.    Past Medical History:  GERD  Hypertension, not on medication  Type 2 diabetes mellitus, not on medications currently, previously on Metformin    Past Surgical History:  Past Surgical History:   Procedure Laterality Date     BRONCHOSCOPY RIGID OR FLEXIBLE W/TRANSENDOSCOPIC ENDOBRONCHIAL ULTRASOUND GUIDED Bilateral 1/26/2022    Procedure: Right BRONCHOSCOPY, FIBEROPTIC, endobronchial ultrasound, pleural biopsy;  Surgeon: Dallin Agrawal MD;  Location: [facility] OR     INJECT BLOCK MEDIAL BRANCH CERVICAL/THORACIC/LUMBAR        INSERT CHEST TUBE Right 2022    Procedure: INSERTION, CATHETER, INTERCOSTAL, FOR DRAINAGE;  Surgeon: Dallin Agrawal MD;  Location:  GI     INSERT CHEST TUBE Right 3/9/2022    Procedure: INSERTION, CATHETER, INTERCOSTAL, FOR DRAINAGE;  Surgeon: Sushila Antonio MD;  Location:  GI     IR CHEST TUBE REMOVAL TUNNELED RIGHT  2022     OPTICAL TRACKING SYSTEM CRANIOTOMY, EXCISE TUMOR, COMBINED Left 2022    Procedure: Left stealth craniotomy for tumor resection with motor mapping;  Surgeon: Stephen Moran MD;  Location:  OR     ORTHOPEDIC SURGERY      Ganesh. Rotator cuff repair.     PLEUROSCOPY N/A 2022    Procedure: Pleuroscopy with Pleural Biopsy;  Surgeon: Dallin Agrawal MD;  Location:  OR       Social History:  Lives with wife and 4 kids in [place]. Works as a  for an apartment complex in [place]. Exposure to household chemicals and . No significant exposure to asbestos. No signal exposure to benzene or similar chemicals. No significant smoking history-states that he smoked 1 to 2 cigarettes occasionally per month for about 2 years in college, non-smoking since then. No significant alcohol use history. No other recreational substances. Good support system. Kids are 23, 19, 17 and 13.    Family History  Significant history for cancers on maternal side. Mother  of uterine cancer. 2 maternal uncles have possible metastatic melanoma.    Outpatient Medications:  Not currently taking any outpatient medications. Has been prescribed Metformin in the past.    Physical Exam:    Blood pressure 136/88, pulse 71, temperature 98  F (36.7  C), resp. rate 16, weight 98.9 kg (218 lb), SpO2 98 %.     General: alert and cooperative, sitting up in chair  HEENT: sclera anicteric, EOMI, MMM. Pupils equal and reactive.   Resp: breathing comfortably   GI: soft, non-tender, non-distended, bowel sounds present and normoactive  MSK: warm and well-perfused, normal tone  Skin:  no rashes on limited exam, no jaundice  Neuro: Alert awake and oriented x4, strength is 5 on 5 throughout, sensations are grossly intact    Labs & Studies: I personally reviewed the following studies:  Most Recent 3 CBC's:  Recent Labs   Lab Test 09/12/22  1642 09/01/22  0839 07/19/22  0630   WBC 13.8* 4.1 8.9   HGB 14.4 6.2* 11.6*   MCV 94 100 95    126* 215     Most Recent 3 BMP's:  Recent Labs   Lab Test 09/12/22  1642 09/01/22  0839 09/01/22  0818 07/19/22  1312 07/19/22  1239 07/19/22  0929 07/19/22  0630    137  --   --   --   --  141   POTASSIUM 3.6 3.5  --  3.5  --   --  3.1*   CHLORIDE 100 101  --   --   --   --  108   CO2 28 30  --   --   --   --  29   BUN 20.3* 19  --   --   --   --  11   CR 0.66* 0.56* 0.6*  --   --   --  0.54*   ANIONGAP 12 6  --   --   --   --  4   TORY 9.7 8.0*  --   --   --   --  8.0*   * 430*  --   --  322*   < > 250*    < > = values in this interval not displayed.    Most Recent 2 LFT's:  Recent Labs   Lab Test 09/12/22 1642 09/01/22  0839   AST 20 7   ALT 31 31   ALKPHOS 105 127   BILITOTAL 0.7 0.6    Most Recent TSH and T4:  Recent Labs   Lab Test 01/19/22  2006   TSH 1.81     I reviewed the above labs today.    Imaging:   CT CHEST WITH CONTRAST  9/1/2022 8:34 AM     CLINICAL HISTORY: Stage 4 NSCLC, new right-sided chest pain, evaluate.  Malignant neoplasm of lower lobe of right lung (H).     TECHNIQUE: CT chest with IV contrast. Multiplanar reformats were  obtained. Dose reduction techniques were used.  CONTRAST: 106mL Isovue-370     COMPARISON: 7/5/2022.     FINDINGS:   LUNGS AND PLEURA: Right lower lobe lung nodule medially on series 5  image 169 measures 1.8 x 1.1 cm, not appreciably changed. A pleural  nodule seen previously has nearly completely resolved, with only 2-3  mm of residual pleural thickening on series 4 image 63. Trace pleural  fluid in the posterior costophrenic sulcus is unchanged. No  pneumothorax.     3 mm left upper lobe pulmonary nodule  image 54 of series 5 is stable.  Stable interstitial opacities and/or scarring at the right lung base  is stable. No new findings.     MEDIASTINUM/AXILLAE: No lymphadenopathy. Small, partially calcified  subcarinal and paraesophageal lymph nodes are stable. No coronary  artery calcification. No central pulmonary embolus.     UPPER ABDOMEN: No significant finding.     MUSCULOSKELETAL: No suspicious bone lesions. No rib fractures  identified.                                                                      IMPRESSION:   1.  Near resolution of previously seen right pleural nodule.  2.  Stable right lower lobe pulmonary nodule.  3.  No new findings. No specific abnormality to explain the patient's  chest pain.      AMELIA SORENSEN MD          ASSESSMENT AND PLAN:  Stage IV NSCLC, Rt lung adenocarcinoma with metastasis to pleura, mediastinum , rt pleural effusion and brain diagnosed 1/2022 (AJCC 8th edition)  PD-L1 TPS 2-3% by Nibley   NGS [facility] panel-EML4:ALK rearragement; chr2:[id number], chr2:[id number]  NGS Guardant- GNAS R201H, KRAS K5E- No ALK    Overall prognosis for ALK rearrangement lung cancer is median overall survival > 7 years based on the most recent update of the WINSOME study. He began Alectinib 600 mg BID 3/2/22 and unfortunately developed grade 3 myalgias which have improved with lowering the dose 450 mg BID. He was holding drug 4/2 to 4/11 due to MSSA infection from pleurex which is removed. Resumed at 450 mg BID, doing well, initial CT with good SC in the chest. Had pleuretic right sided chest pain so CT complete 9/1 which shows ongoing SC in chest.   -pending hgb tomorrow, resume 450 mg BID dose alectinib     #normocytic anemia: sudden drop to <7, requiring blood transfusion. Lab work up unrevealing. Has recovered to baseline today at 14--> will recheck tomorrow to confirm.   -recheck hgb q2 weeks x3   -consider EGD if hgb drops again, risk for GI bleed with chronic steroid use    # Brain mets:  Brain MRI with several brain mets, s/p GK to 11-12 brain mets. F/u Brain MRI in June was showing enlargement of the one of the lesions along with edema, therefore had to undergo craniotomy followed by resection, the final biopsy consistent with radiation necrosis.  Has been having issues with controlling the radiation necrosis and swelling requiring steroids, currently on 4 mg of dexamethasone which is managed by neurosurgery.  -Dr. Persaud in discussion with Dr. Moran about possibility using of bevacizumab for radiation necrosis so that we can wean off of the steroids and control the radiation necrosis--15 mg/kg every 3 weeks    # T2 DM- A1c 10.1.  PTA Lantus 30 units bid and has novolog, recent increase in BS following increase in dex to 4 mg.   -F/u with PCP for BS , encouraged f/u with PCP for management of glucose and insulin    #grade 3 myalgias: resolved.  onset ~2 weeks after starting alectinib. Now has improved with holding therapy and Dose reduction as above to 450 mg BID.    #grade 2 fatigue: worse since holding alectinib and in spite of drastic hgb improvement. Will check TSH.     #MSSA infection, Sepsis at pleurex site- resolved  # Pleural effusion: s/p pleurex palcement  2/16/22. Then on 4/2 right PleurX catheter s/p removal for severe sepsis due to MSSA infection.    # HTN- conitnue hydrochlorothiazide  -Monitor BP at home and will f/u with PCP     #dry eyes  #blurred vision  Continue artificial tears. Saw ophthalmology previosuly, changed prescription, mow improved.  No neuro sx.     #Psychiatry  # Situational Anxiety   - Feels more irritable lately--steroids contributing. Declined referral to therapist and denies medication intervention for now. PCP can help manage this too if he has established relationship with him    #COVID vaccine: No COVID vaccine on record, discuss at next visit    Overall plan  Repeat hgb tomorrow, restart alectinib 450 mg if stable  hgb every 2 weeks x3  RTC with me in ~4  weeks  Ct C/A and Dr. Persaud ~11/2  RTC with me after CT next week    50 minutes spent on the date of the encounter doing chart review, review of test results, interpretation of tests, patient visit, documentation and discussion with other provider(s)     Chelsea Villegas CNP on 9/12/2022 at 5:39 PM

## 2022-09-13 ENCOUNTER — MYC MEDICAL ADVICE (OUTPATIENT)
Dept: ONCOLOGY | Facility: CLINIC | Age: 42
End: 2022-09-13

## 2022-09-13 ENCOUNTER — OFFICE VISIT (OUTPATIENT)
Dept: FAMILY MEDICINE | Facility: CLINIC | Age: 42
End: 2022-09-13
Payer: COMMERCIAL

## 2022-09-13 ENCOUNTER — TELEPHONE (OUTPATIENT)
Dept: ONCOLOGY | Facility: CLINIC | Age: 42
End: 2022-09-13

## 2022-09-13 VITALS
OXYGEN SATURATION: 96 % | WEIGHT: 216 LBS | HEART RATE: 84 BPM | BODY MASS INDEX: 31.9 KG/M2 | SYSTOLIC BLOOD PRESSURE: 144 MMHG | DIASTOLIC BLOOD PRESSURE: 96 MMHG | TEMPERATURE: 98.5 F

## 2022-09-13 DIAGNOSIS — G89.3 CANCER ASSOCIATED PAIN: Primary | ICD-10-CM

## 2022-09-13 DIAGNOSIS — I10 BENIGN ESSENTIAL HYPERTENSION: ICD-10-CM

## 2022-09-13 DIAGNOSIS — Z79.899 MEDICAL MARIJUANA USE: ICD-10-CM

## 2022-09-13 LAB
ALBUMIN SERPL-MCNC: 3.5 G/DL (ref 3.4–5)
ALP SERPL-CCNC: 109 U/L (ref 40–150)
ALT SERPL W P-5'-P-CCNC: 36 U/L (ref 0–70)
ANION GAP SERPL CALCULATED.3IONS-SCNC: 6 MMOL/L (ref 3–14)
AST SERPL W P-5'-P-CCNC: 11 U/L (ref 0–45)
BASOPHILS # BLD AUTO: 0.1 10E3/UL (ref 0–0.2)
BASOPHILS NFR BLD AUTO: 1 %
BILIRUB SERPL-MCNC: 0.9 MG/DL (ref 0.2–1.3)
BUN SERPL-MCNC: 15 MG/DL (ref 7–30)
CALCIUM SERPL-MCNC: 9.1 MG/DL (ref 8.5–10.1)
CHLORIDE BLD-SCNC: 103 MMOL/L (ref 94–109)
CK SERPL-CCNC: 38 U/L (ref 30–300)
CO2 SERPL-SCNC: 31 MMOL/L (ref 20–32)
CREAT SERPL-MCNC: 0.58 MG/DL (ref 0.66–1.25)
EOSINOPHIL # BLD AUTO: 0.2 10E3/UL (ref 0–0.7)
EOSINOPHIL NFR BLD AUTO: 1 %
ERYTHROCYTE [DISTWIDTH] IN BLOOD BY AUTOMATED COUNT: 14.6 % (ref 10–15)
GFR SERPL CREATININE-BSD FRML MDRD: >90 ML/MIN/1.73M2
GLUCOSE BLD-MCNC: 423 MG/DL (ref 70–99)
HCT VFR BLD AUTO: 41.5 % (ref 40–53)
HCV AB SERPL QL IA: NONREACTIVE
HGB BLD-MCNC: 14.3 G/DL (ref 13.3–17.7)
HIV 1+2 AB+HIV1 P24 AG SERPL QL IA: NONREACTIVE
LYMPHOCYTES # BLD AUTO: 4.2 10E3/UL (ref 0.8–5.3)
LYMPHOCYTES NFR BLD AUTO: 37 %
MCH RBC QN AUTO: 33.3 PG (ref 26.5–33)
MCHC RBC AUTO-ENTMCNC: 34.5 G/DL (ref 31.5–36.5)
MCV RBC AUTO: 97 FL (ref 78–100)
MONOCYTES # BLD AUTO: 0.7 10E3/UL (ref 0–1.3)
MONOCYTES NFR BLD AUTO: 7 %
NEUTROPHILS # BLD AUTO: 6 10E3/UL (ref 1.6–8.3)
NEUTROPHILS NFR BLD AUTO: 54 %
PLATELET # BLD AUTO: 248 10E3/UL (ref 150–450)
POTASSIUM BLD-SCNC: 4.2 MMOL/L (ref 3.4–5.3)
PROT SERPL-MCNC: 5.8 G/DL (ref 6.8–8.8)
RBC # BLD AUTO: 4.3 10E6/UL (ref 4.4–5.9)
SODIUM SERPL-SCNC: 140 MMOL/L (ref 133–144)
WBC # BLD AUTO: 11.1 10E3/UL (ref 4–11)

## 2022-09-13 PROCEDURE — 82550 ASSAY OF CK (CPK): CPT | Performed by: FAMILY MEDICINE

## 2022-09-13 PROCEDURE — 80053 COMPREHEN METABOLIC PANEL: CPT | Performed by: FAMILY MEDICINE

## 2022-09-13 PROCEDURE — 99214 OFFICE O/P EST MOD 30 MIN: CPT | Performed by: FAMILY MEDICINE

## 2022-09-13 PROCEDURE — 85025 COMPLETE CBC W/AUTO DIFF WBC: CPT | Performed by: FAMILY MEDICINE

## 2022-09-13 PROCEDURE — 36415 COLL VENOUS BLD VENIPUNCTURE: CPT | Performed by: FAMILY MEDICINE

## 2022-09-13 RX ORDER — LISINOPRIL 10 MG/1
10 TABLET ORAL DAILY
Qty: 90 TABLET | Refills: 1 | Status: SHIPPED | OUTPATIENT
Start: 2022-09-13 | End: 2022-10-03 | Stop reason: DRUGHIGH

## 2022-09-13 ASSESSMENT — PAIN SCALES - GENERAL: PAINLEVEL: NO PAIN (0)

## 2022-09-13 ASSESSMENT — ENCOUNTER SYMPTOMS
ROS GI COMMENTS: POOR APPETITE
CONSTITUTIONAL NEGATIVE: 1
HEADACHES: 1

## 2022-09-13 NOTE — TELEPHONE ENCOUNTER
DATE:  9/13/22     TIME OF RECEIPT FROM LAB: 2:41pm      LAB TEST:  Glucose     LAB VALUE:  423    RESULTS PAGED TO (PROVIDER):  2:42 Message sent to Chelsea Villegas     TIME LAB VALUE REPORTED TO PROVIDER: 2:44pm Chelsea Villegas acknowledge lab value     RECOMMENDATIONS: Call placed to Connor to let him know his BS was elevated at 423, he states that this is where his BS normally is, he continues to give himself insulin.

## 2022-09-13 NOTE — PROGRESS NOTES
"  Assessment and Plan    (G89.3) Cancer associated pain  (primary encounter diagnosis)  Comment:   Plan:     (Z79.899) Medical marijuana use  Comment: certified, they will reach out to him.  Plan:     (I10) Benign essential hypertension  Comment: DM, not on ACEI, BP consistently borderline or frankly high, starting lisinopril.  Has DM appt in a few weeks, so can have BP reheck then  Plan: lisinopril (ZESTRIL) 10 MG tablet        RTC in 2w for BP recheck    Kole London MD      Prashanth Ryan is a 42 year old, presenting for the following health issues:  Medication Request    PT is present for medical marijuana consult    History of Present Illness       Reason for visit:  Med card    He eats 2-3 servings of fruits and vegetables daily.He consumes 0 sweetened beverage(s) daily.He exercises with enough effort to increase his heart rate 60 or more minutes per day.  He exercises with enough effort to increase his heart rate 3 or less days per week.   He is taking medications regularly.     \"I'm miserable\", but smokes MJ regularly.  Finds it is somewhat helpful.  Pain is due to cancer.  Also has issues with appetite.      Pt notes that he is out of his hydrochlorothiazide, will be picking more up later this morning.  Is not currently on ACI/ARB, notes that he was on lisinopril in the past, but no longer is.  Not sure why.  Willing to start.    Review of Systems   Constitutional: Negative.    Cardiovascular: Positive for chest pain.   Gastrointestinal:        Poor appetite   Neurological: Positive for headaches.            Objective    BP (!) 144/96 (BP Location: Right arm, Patient Position: Sitting, Cuff Size: Adult Large)   Pulse 84   Temp 98.5  F (36.9  C) (Oral)   Wt 98 kg (216 lb)   SpO2 96%   BMI 31.90 kg/m    Body mass index is 31.9 kg/m .  Physical Exam  Vitals and nursing note reviewed.   Constitutional:       Appearance: Normal appearance.   Skin:     General: Skin is warm and dry.   Neurological:      " General: No focal deficit present.      Mental Status: He is alert and oriented to person, place, and time.   Psychiatric:         Mood and Affect: Mood normal.         Behavior: Behavior normal.

## 2022-09-14 DIAGNOSIS — G93.6 VASOGENIC CEREBRAL EDEMA (H): ICD-10-CM

## 2022-09-14 RX ORDER — DEXAMETHASONE 4 MG/1
TABLET ORAL
Qty: 60 TABLET | Refills: 0 | Status: ON HOLD | OUTPATIENT
Start: 2022-09-14 | End: 2022-11-09

## 2022-09-18 NOTE — TELEPHONE ENCOUNTER
"Refill request for Decadron.     Per Dr. Moran's last OV note on 8/1: \"I have increased his dexamethasone to 4 mg daily\". \"We will see him back in 6 weeks to see how he is doing.     Patient is scheduled to see Dr. Moran on 10/3. Will route to care team to review.   " 81.6

## 2022-09-26 ENCOUNTER — DOCUMENTATION ONLY (OUTPATIENT)
Dept: ONCOLOGY | Facility: CLINIC | Age: 42
End: 2022-09-26

## 2022-09-27 ENCOUNTER — MYC REFILL (OUTPATIENT)
Dept: NEUROSURGERY | Facility: CLINIC | Age: 42
End: 2022-09-27

## 2022-09-27 DIAGNOSIS — C79.31 BRAIN METASTASIS: ICD-10-CM

## 2022-09-27 DIAGNOSIS — C34.31 MALIGNANT NEOPLASM OF LOWER LOBE OF RIGHT LUNG (H): ICD-10-CM

## 2022-09-27 RX ORDER — LEVETIRACETAM 1000 MG/1
1000 TABLET ORAL 2 TIMES DAILY
Qty: 60 TABLET | Refills: 0 | Status: CANCELLED | OUTPATIENT
Start: 2022-09-27

## 2022-09-27 NOTE — PROGRESS NOTES
MEDICAL ONCOLOGY FOLLOW UP NOTE    PATIENT NAME: Connor Emerson  ENCOUNTER DATE: 9/28/2022    Care Team  Primary Oncologist: Bassam Persaud MD    REASON FOR CURRENT VISIT: F/u of lung cancer    HISTORY OF PRESENT ILLNESS:  Mr. Connor Emerson is a 42 year old  male who is a non-smoker with PMHx of T2DM, HTN with metastatic NSCLC comes for follow up     Oncologic Hx:    Diagnosis:   Stage IV NSCLC, Rt lung adenocarcinoma with metastasis to pleura, mediastinum , rt pleural effusion and brain diagnosed 1/2022 (AJCC 8th edition)  PD-L1 TPS 2-3% by [facility]   NGS [facility] panel-EML4:ALK rearragement  NGS Guardant- GNAS R201H, KRAS K5E- No ALK    Treatment:   Current:  3/2/22- now- Alectinib 600 mg BID (Dose reduced to 450 mg BID due to grade 3 myalgias 3/21/22)  9/28/22- Bevacizumab for radiation necrosis  )  Past:  2/15/22- GK to 11 briain lesions    Intent of treatment: Palliative    Oncologic course:  1/19/22 to 1/22/22-Admitted to [facility] for 2 week progressive SOB secondary to have large rt sided pleural effusion, needing thoracentesis x2 (1.7L and 2.0 L removed), cytology positive for malignancy, adenocarcinoma.   1/26/22- Rt pleural mass biopsy-Dr. Agrawal--POSITIVE FOR ADENOCARCINOMA CONSISTENT WITH LUNG PRIMARY, admixed with mesothelial hyperplasia and inflammatory infiltrate (+ TTF-1 and CK 7;  negative  p40, calretinin and WT-1. PAX8 immunostain focal +). 4th thoracentesis done simultaneously - 3L approx removed.   2/1/22- PET/CT-Right lower lobe central infiltrative FDG avid 8.2 x 9.6 cm mass representing a primary lung adenocarcinoma. Ipsilateral right perihilar, bilateral pretracheal, subcarinal and superior mediastinal michele metastases. Contralateral mildly FDG avid few lung nodules are suspicious for contralateral metastasis. At least 3 intracranial metastases in the right frontal lobe, left frontal lobe and left cerebellar hemisphere. Nonspecific mild diffuse bone marrow uptake. Further evaluation with a spine MRI  could be considered to rule out early marrow infiltration. This could also be seen with red marrow conversion.  2/5/22-  Brain MRI- At least 9 intracranial metastases as detailed above. The dominant lesions involving the orbital right frontal lobe, the posterior left middle frontal gyrus, anterior right temporal lobe and in the left cerebellar hemisphere have surrounding moderate vasogenic type edema.  2/15/22- Saw Dr. Arango from [facility] Onc- Rcd GK to 12 lesion in bran  2/16/22- Pleurex placement   3/2/22- Started Alectinib 600 mg BID  3/21/22- Dose reduced to 450 mg BID due to grade 3 myalgias and fatigue  4/2/22 to 4/5/22- Admitted at [facility] for- Severe sepsis due to MSSA infection of right PleurX catheter s/p removal- He presented with onset of pain at tube site starting 4/1; at arrival was tachycardic with leukocytosis (22.7) and elevated lactic acid (2.9).  CT chest showed fluid and stranding tracking outside the pleural space into chest wall along pleural catheter.  IR was consulted and removed catheter 4/2 with report of pustular drainage and tip culture growing MSSA.  Thoracic Surg was consulted who felt no surgical indication necessary given minimal pleural fluid and lack of any signs of abscess.  Initially treated with broad spectrum coverage for sepsis, narrowed to Ancef once sensitivities returned with plan to transition to cefadroxil for an additional 10 days at discharge per ID. Held drug 4/2 to 4/11 5/2/22- CT CAP- Overall, positive response to therapy with decreased size of right lower lobe and right pleural-based masses, pulmonary metastases, hilar and mediastinal lymphadenopathy. However, a single right posterior pleural-based mass has slightly increased in size since 2/24/2022. No metastatic disease in the abdomen and pelvis. Right Pleurx catheter has been removed. Trace right pleural effusion and right basilar atelectasis.  5/2/22- Brain MRI- The previously demonstrated brain metastases are  mildly diminished in size versus to 2/5/2022. The degree of edema is also diminished but not completely resolved. Probable trace amounts of intralesional bleeding demonstrated on the gradient sequence within the metastases. No definite new metastasis or progressive mass effect. No hydrocephalus or infarct.    6/15/22 to 6/17/22- Admitted at [facility]-with aphasia and word finding difficulty over last few weeks.  He presented to [facility] ED on 6/10 for evaluation of his symptoms. MRI brain showed multiple intracranial metastases, with interval enlargement of the dominant lesion within the left frontal lobe and increased surrounding vasogenic edema with 2 mm rightward shift of the septum pellucidum. Due to his worsening anxiety, he left AMA. His symptoms continued to progress to where he could not write at work so he decided to go to the ED for re-evaluation and treatment. Evaluated by NSGY, Rad Onc (radiaiton necrosis vs tumor progression).  6/16/22- MR Brain (6/16) shows multiple intracranial metastases, with interval enlargement  of the dominant lesion within the left frontal lobe and increased surrounding vasogenic edema with 2 mm rightward shift of the septum pellucidum.  6/16/22- - CT CAP shows slightly decreased size of right lower lobe and right pleural-based masses. No new pulmonary nodules or lymphadenopathy; No evidence of metastatic disease in the abdomen or pelvis.   6/28/22 to 6/30/22- Admitted at [facility]- Elective left Stealth craniotomy with resection of brain tumor due to ongoing symptoms. No intraoperative complications. EBL 50 ml.  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  7/5/22- Ct CAP- Right lower lobe low-density nodules are not significantly changed. A small left upper lobe pulmonary nodule is also unchanged. Trace pleural fluid on the right has increased slightly. No convincing evidence for metastatic disease in the abdomen or pelvis.  7/18/22 to 7/19/22- Admitted to [facility] for seizure-  Reportedly was only taking once Keppra instead of twice daily. Also resumed on dexamethasone 2 mg daily  8/1/22- Brain MRI- Redemonstrated postsurgical changes status post left frontoparietal Craniotomy. Interval increase in size of the dominant ring-enhancing lesion in the left posterior superior frontal lobe with increased moderate surrounding vasogenic edema and local mass effect resulting in narrowing of the supratentorial ventricular system. No significant midline shift/herniation at this time  8/1/22- Dex was increased to 4 mg daily by Dr. Moran  9/1/22- CT Chest- Near resolution of previously seen right pleural nodule. Stable right lower lobe pulmonary nodule    He works as a maintenance manger for apartment complexes    Interval Hx:  Connor is here prior to starting Bevacizumab for Radiation necrosis. There was a delay in getting insurance approval.  On Alectinib 450 mg twice daily, has been out for 3 days  He continues to be on Dex 4 mg daily and having a hard time controlling BS, range between 200-400  He checks his BS twice daily, takes long acting 30 units, and also sliding scale for correction  Today AM it was 270  The insulin is managed by PCP and now also following endocrinology  Also on Keppra 1000 mg bID.  He seems irritable and   Family is afraid to be around him or talk to him  He was started on lisioprila nd hydrochlorothiazide for HTN  Not sleeping great at night intermittently  No dark/tarry stools   Breathing is stable, no cough, no fever or chills.  Has noticed some wheezing recently  Has exertional SOB on takign flight of stairs  Continues to have pain in the hips, legs and upper torso   He continues to work daily,  independent of ADL and IADL    REVIEW OF SYSTEMS: 14 point ROS negative other than the symptoms noted above in the HPI.    Wt Readings from Last 4 Encounters:   09/13/22 98 kg (216 lb)   09/12/22 98.9 kg (218 lb)   08/29/22 98.6 kg (217 lb 6.4 oz)   07/18/22 94.4 kg (208 lb 1.6 oz)       Review of Systems:  A comprehensive ROS was performed and found to be negative or non-contributory with the exception of that noted in the HPI above.    Past Medical History:  GERD  Hypertension, not on medication  Type 2 diabetes mellitus, not on medications currently, previously on Metformin    Past Surgical History:  Past Surgical History:   Procedure Laterality Date     BRONCHOSCOPY RIGID OR FLEXIBLE W/TRANSENDOSCOPIC ENDOBRONCHIAL ULTRASOUND GUIDED Bilateral 1/26/2022    Procedure: Right BRONCHOSCOPY, FIBEROPTIC, endobronchial ultrasound, pleural biopsy;  Surgeon: Dallin Agrawal MD;  Location: [facility] OR     INJECT BLOCK MEDIAL BRANCH CERVICAL/THORACIC/LUMBAR       INSERT CHEST TUBE Right 2/16/2022    Procedure: INSERTION, CATHETER, INTERCOSTAL, FOR DRAINAGE;  Surgeon: Dlalin Agrawal MD;  Location: [facility] GI     INSERT CHEST TUBE Right 3/9/2022    Procedure: INSERTION, CATHETER, INTERCOSTAL, FOR DRAINAGE;  Surgeon: Sushila Antonio MD;  Location: [facility] GI     IR CHEST TUBE REMOVAL TUNNELED RIGHT  4/2/2022     OPTICAL TRACKING SYSTEM CRANIOTOMY, EXCISE TUMOR, COMBINED Left 6/28/2022    Procedure: Left stealth craniotomy for tumor resection with motor mapping;  Surgeon: Stephen Moran MD;  Location:  OR     ORTHOPEDIC SURGERY      Ganesh. Rotator cuff repair.     PLEUROSCOPY N/A 1/26/2022    Procedure: Pleuroscopy with Pleural Biopsy;  Surgeon: Dallin Agrawal MD;  Location:  OR       Social History:  Lives with wife and 4 kids in [place]. Works as a  for an apartment complex in [place]. Exposure to household chemicals and . No significant exposure to asbestos. No signal exposure to benzene or similar chemicals. No significant smoking history-states that he smoked 1 to 2 cigarettes occasionally per month for about 2 years in college, non-smoking since then. No significant alcohol use history. No other recreational substances. Good support system. Kids are 23, 19, 17 and  "13.    Family History  Significant history for cancers on maternal side. Mother  of uterine cancer. 2 maternal uncles have possible metastatic melanoma.    Outpatient Medications:  Current Outpatient Medications   Medication     alcohol swab prep pads     alectinib (ALECENSA) 150 MG CAPS     blood glucose (NO BRAND SPECIFIED) test strip     blood glucose calibration (NO BRAND SPECIFIED) solution     blood glucose monitoring (NO BRAND SPECIFIED) meter device kit     citalopram (CELEXA) 20 MG tablet     Continuous Blood Gluc  (FREESTYLE IRENE 2 READER) YVES     Continuous Blood Gluc Sensor (FREESTYLE IRENE 2 SENSOR) MISC     dexamethasone (DECADRON) 4 MG tablet     hydrochlorothiazide (HYDRODIURIL) 25 MG tablet     insulin aspart (NOVOLOG PEN) 100 UNIT/ML pen     insulin glargine (LANTUS PEN) 100 UNIT/ML pen     insulin pen needle (31G X 8 MM) 31G X 8 MM miscellaneous     levETIRAcetam (KEPPRA) 1000 MG tablet     lisinopril (ZESTRIL) 10 MG tablet     methocarbamol (ROBAXIN) 500 MG tablet     oxyCODONE (ROXICODONE) 5 MG tablet     prochlorperazine (COMPAZINE) 10 MG tablet     thin (NO BRAND SPECIFIED) lancets     No current facility-administered medications for this visit.       Physical Exam:    Blood pressure (!) 149/90, pulse 74, temperature 98.1  F (36.7  C), temperature source Oral, resp. rate 16, height 1.753 m (5' 9.02\"), weight 98.1 kg (216 lb 4.8 oz), SpO2 99 %.   General: alert and cooperative, sitting up in chair  HEENT: sclera anicteric, EOMI, MMM. Pupils equal and reactive.   Resp: breathing comfortably   GI: soft, non-tender, non-distended, bowel sounds present and normoactive  MSK: warm and well-perfused, normal tone  Skin: no rashes on limited exam, no jaundice  Neuro: Alert awake and oriented x4, strength is 5 on 5 throughout, sensations are grossly intact    Labs & Studies: I personally reviewed the following studies:  Most Recent 3 CBC's:  Recent Labs   Lab Test 22  0852 " 09/12/22  1642 09/01/22  0839   WBC 11.1* 13.8* 4.1   HGB 14.3 14.4 6.2*   MCV 97 94 100    252 126*     Most Recent 3 BMP's:  Recent Labs   Lab Test 09/13/22  0852 09/12/22  1642 09/01/22  0839    140 137   POTASSIUM 4.2 3.6 3.5   CHLORIDE 103 100 101   CO2 31 28 30   BUN 15 20.3* 19   CR 0.58* 0.66* 0.56*   ANIONGAP 6 12 6   TORY 9.1 9.7 8.0*   * 374* 430*    Most Recent 2 LFT's:  Recent Labs   Lab Test 09/13/22  0852 09/12/22  1642   AST 11 20   ALT 36 31   ALKPHOS 109 105   BILITOTAL 0.9 0.7    Most Recent TSH and T4:  Recent Labs   Lab Test 09/12/22  1642   TSH 2.56          ASSESSMENT AND PLAN:  Stage IV NSCLC, Rt lung adenocarcinoma with metastasis to pleura, mediastinum , rt pleural effusion and brain diagnosed 1/2022 (AJCC 8th edition)  PD-L1 TPS 2-3% by [facility]   NGS [facility] panel-EML4:ALK rearragement; chr2:[id number], chr2:[id number]  NGS Guardant- GNAS R201H, KRAS K5E- No ALK    He began Alectinib 600 mg BID 3/2/22 and unfortunately developed grade 3 myalgias which have improved with lowering the dose 450 mg BID. He was holding drug 4/2 to 4/11 due to MSSA infection from pleurex which is removed. Resumed at 450 mg BID. 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  Initial CT with good NC in the chest. Had pleuretic right sided chest pain so CT chest on 9/1 which shows ongoing NC in chest. Due to ongoing myalgias (Although CK is normal), we will dose reduce to 300 mg BID, can consider going back up 450 mg BID if he improves. Labs wnl today.  Refilled alectinib today, had missed for last 3 days  -Continue alectinib 300 mg BID  -Other appts with me as scheduled    Plan  Arrange for future infusions for bevacizumab   Cancel appt with me on 10/12   Appt with Np/PA prior to cycle 2 of Bevacizumab on 10/19   Ct already scheduled   RTC with me after CT (move appt to Oct 31)      # Brain mets:  #  Radiation necrosis  Baseline Brain MRI with several brain mets, s/p GK to 11-12 brain mets. F/u Brain MRI in June was showing enlargement of the one of the lesions along with edema, therefore had to undergo craniotomy followed by resection, the final biopsy consistent with radiation necrosis.  Has been having issues with controlling the radiation necrosis and swelling requiring steroids, currently on 4 mg of dexamethasone which is managed by neurosurgery.  -Will start bevacizumab for radiation necrosis so that we can wean off of the steroids and control the radiation necrosis--15 mg/kg every 3 weeks, plan for at least 3 months of therapy. Will discuss with Dr. Moran about weaning dexamethasone,  There is good data on safety of bevacizumab with alectinib.  Discussed potential SE of bevacizumab today. He will check his BP at home regularly.  Https://pubmed.ncbi.nlm.nih.gov/24392958/  -my plan is to continue bevacizumab every 3 weeks and get a follow-up MRI possibly in early November.  At the time we can consdier tapering the dexamethasone to 2 mg daily for 2-3 weeks then stop it, Will confirm plan with Dr. Moran.   -Next Brain MRI 10/26/2022 ordered      #grade 3 myalgias: resolved after lowering dose, now seems to have recrudescence,  moderate in severity, will decrease dose to 300 mg BID    #normocytic anemia: sudden drop to <7, requiring 1 unit blood transfusion. Lab work up unrevealing. Has recovered to baseline today at 14  -recheck hgb q2 weeks x3   -consider EGD if hgb drops again, risk for GI bleed with chronic steroid use      # T2 DM- A1c 10.1.  PTA Lantus 30 units bid and has novolog, recent increase in BS following increase in dex to 4 mg.   BS not currently well controlled, range b/w 20--400, dex not helping.   -F/u with PCP for BS , will most likley need further adjustment, may be add something scheduled at meal time and sliding scale in addition to it      #grade 2 fatigue: worse since holding alectinib  and in spite of drastic hgb improvement. Will check TSH.     #MSSA infection, Sepsis at pleurex site- resolved  # Pleural effusion: s/p pleurex palcement  2/16/22. Then on 4/2 right PleurX catheter s/p removal for severe sepsis due to MSSA infection.    # HTN- conitnue hydrochlorothiazide and lisinopril  -Monitor BP at home and will f/u with PCP     #dry eyes  #blurred vision  Continue artificial tears. Saw ophthalmology previosuly, changed prescription, mow improved.  No neuro sx.     #Psychiatry  # Situational Anxiety   - Feels more irritable lately--steroids contributing. Declined referral to therapist and denies medication intervention for now. PCP can help manage this too if he has established relationship with him    #COVID vaccine: No COVID vaccine on record, discuss at next visit    On the day of service  Chart review: 5 minutes  Visit duration: 30 minutes  Care coordination: 5 minutes    Bassam Persaud MD    Hematology, Oncology and Transplantation

## 2022-09-28 ENCOUNTER — ONCOLOGY VISIT (OUTPATIENT)
Dept: ONCOLOGY | Facility: CLINIC | Age: 42
End: 2022-09-28
Attending: STUDENT IN AN ORGANIZED HEALTH CARE EDUCATION/TRAINING PROGRAM
Payer: COMMERCIAL

## 2022-09-28 ENCOUNTER — APPOINTMENT (OUTPATIENT)
Dept: LAB | Facility: CLINIC | Age: 42
End: 2022-09-28
Attending: STUDENT IN AN ORGANIZED HEALTH CARE EDUCATION/TRAINING PROGRAM
Payer: COMMERCIAL

## 2022-09-28 VITALS
RESPIRATION RATE: 16 BRPM | SYSTOLIC BLOOD PRESSURE: 149 MMHG | DIASTOLIC BLOOD PRESSURE: 90 MMHG | WEIGHT: 216.3 LBS | HEART RATE: 74 BPM | OXYGEN SATURATION: 99 % | TEMPERATURE: 98.1 F | HEIGHT: 69 IN | BODY MASS INDEX: 32.04 KG/M2
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
## 2022-09-28 DIAGNOSIS — I67.89 RADIATION THERAPY INDUCED BRAIN NECROSIS: Primary | ICD-10-CM

## 2022-09-28 DIAGNOSIS — C34.31 MALIGNANT NEOPLASM OF LOWER LOBE OF RIGHT LUNG (H): ICD-10-CM

## 2022-09-28 DIAGNOSIS — C79.31 BRAIN METASTASIS: ICD-10-CM

## 2022-09-28 DIAGNOSIS — C34.31 MALIGNANT NEOPLASM OF LOWER LOBE OF RIGHT LUNG (H): Primary | ICD-10-CM

## 2022-09-28 LAB
ALBUMIN UR-MCNC: NEGATIVE MG/DL
CK SERPL-CCNC: 243 U/L (ref 39–308)
MAGNESIUM SERPL-MCNC: 1.8 MG/DL (ref 1.7–2.3)
POTASSIUM SERPL-SCNC: 4.2 MMOL/L (ref 3.4–5.3)

## 2022-09-28 PROCEDURE — 82550 ASSAY OF CK (CPK): CPT

## 2022-09-28 PROCEDURE — 81003 URINALYSIS AUTO W/O SCOPE: CPT | Performed by: STUDENT IN AN ORGANIZED HEALTH CARE EDUCATION/TRAINING PROGRAM

## 2022-09-28 PROCEDURE — G0463 HOSPITAL OUTPT CLINIC VISIT: HCPCS

## 2022-09-28 PROCEDURE — 250N000011 HC RX IP 250 OP 636: Performed by: STUDENT IN AN ORGANIZED HEALTH CARE EDUCATION/TRAINING PROGRAM

## 2022-09-28 PROCEDURE — 99215 OFFICE O/P EST HI 40 MIN: CPT | Performed by: STUDENT IN AN ORGANIZED HEALTH CARE EDUCATION/TRAINING PROGRAM

## 2022-09-28 PROCEDURE — 36415 COLL VENOUS BLD VENIPUNCTURE: CPT | Performed by: STUDENT IN AN ORGANIZED HEALTH CARE EDUCATION/TRAINING PROGRAM

## 2022-09-28 PROCEDURE — 84132 ASSAY OF SERUM POTASSIUM: CPT | Performed by: STUDENT IN AN ORGANIZED HEALTH CARE EDUCATION/TRAINING PROGRAM

## 2022-09-28 PROCEDURE — 258N000003 HC RX IP 258 OP 636: Performed by: STUDENT IN AN ORGANIZED HEALTH CARE EDUCATION/TRAINING PROGRAM

## 2022-09-28 PROCEDURE — 96413 CHEMO IV INFUSION 1 HR: CPT

## 2022-09-28 PROCEDURE — 83735 ASSAY OF MAGNESIUM: CPT | Performed by: STUDENT IN AN ORGANIZED HEALTH CARE EDUCATION/TRAINING PROGRAM

## 2022-09-28 RX ORDER — LORAZEPAM 2 MG/ML
1 INJECTION INTRAMUSCULAR EVERY 6 HOURS PRN
Status: CANCELLED | OUTPATIENT
Start: 2022-09-28

## 2022-09-28 RX ORDER — ALBUTEROL SULFATE 0.83 MG/ML
2.5 SOLUTION RESPIRATORY (INHALATION)
Status: CANCELLED | OUTPATIENT
Start: 2022-09-28

## 2022-09-28 RX ORDER — EPINEPHRINE 1 MG/ML
0.3 INJECTION, SOLUTION INTRAMUSCULAR; SUBCUTANEOUS EVERY 5 MIN PRN
Status: CANCELLED | OUTPATIENT
Start: 2022-10-26

## 2022-09-28 RX ORDER — DIPHENHYDRAMINE HYDROCHLORIDE 50 MG/ML
50 INJECTION INTRAMUSCULAR; INTRAVENOUS
Status: CANCELLED
Start: 2022-09-28

## 2022-09-28 RX ORDER — ALBUTEROL SULFATE 90 UG/1
1-2 AEROSOL, METERED RESPIRATORY (INHALATION)
Status: CANCELLED
Start: 2022-10-26

## 2022-09-28 RX ORDER — LORAZEPAM 2 MG/ML
1 INJECTION INTRAMUSCULAR EVERY 6 HOURS PRN
Status: CANCELLED | OUTPATIENT
Start: 2022-10-26

## 2022-09-28 RX ORDER — METHYLPREDNISOLONE SODIUM SUCCINATE 125 MG/2ML
125 INJECTION, POWDER, LYOPHILIZED, FOR SOLUTION INTRAMUSCULAR; INTRAVENOUS
Status: CANCELLED
Start: 2022-10-26

## 2022-09-28 RX ORDER — HEPARIN SODIUM,PORCINE 10 UNIT/ML
5 VIAL (ML) INTRAVENOUS
Status: CANCELLED | OUTPATIENT
Start: 2022-10-26

## 2022-09-28 RX ORDER — HEPARIN SODIUM (PORCINE) LOCK FLUSH IV SOLN 100 UNIT/ML 100 UNIT/ML
5 SOLUTION INTRAVENOUS
Status: CANCELLED | OUTPATIENT
Start: 2022-10-26

## 2022-09-28 RX ORDER — MEPERIDINE HYDROCHLORIDE 25 MG/ML
25 INJECTION INTRAMUSCULAR; INTRAVENOUS; SUBCUTANEOUS EVERY 30 MIN PRN
Status: CANCELLED | OUTPATIENT
Start: 2022-10-26

## 2022-09-28 RX ORDER — HEPARIN SODIUM,PORCINE 10 UNIT/ML
5 VIAL (ML) INTRAVENOUS
Status: CANCELLED | OUTPATIENT
Start: 2022-09-28

## 2022-09-28 RX ORDER — HEPARIN SODIUM (PORCINE) LOCK FLUSH IV SOLN 100 UNIT/ML 100 UNIT/ML
5 SOLUTION INTRAVENOUS
Status: CANCELLED | OUTPATIENT
Start: 2022-09-28

## 2022-09-28 RX ORDER — ALBUTEROL SULFATE 0.83 MG/ML
2.5 SOLUTION RESPIRATORY (INHALATION)
Status: CANCELLED | OUTPATIENT
Start: 2022-10-26

## 2022-09-28 RX ORDER — MEPERIDINE HYDROCHLORIDE 25 MG/ML
25 INJECTION INTRAMUSCULAR; INTRAVENOUS; SUBCUTANEOUS EVERY 30 MIN PRN
Status: CANCELLED | OUTPATIENT
Start: 2022-09-28

## 2022-09-28 RX ORDER — EPINEPHRINE 1 MG/ML
0.3 INJECTION, SOLUTION INTRAMUSCULAR; SUBCUTANEOUS EVERY 5 MIN PRN
Status: CANCELLED | OUTPATIENT
Start: 2022-09-28

## 2022-09-28 RX ORDER — METHYLPREDNISOLONE SODIUM SUCCINATE 125 MG/2ML
125 INJECTION, POWDER, LYOPHILIZED, FOR SOLUTION INTRAMUSCULAR; INTRAVENOUS
Status: CANCELLED
Start: 2022-09-28

## 2022-09-28 RX ORDER — ALBUTEROL SULFATE 90 UG/1
1-2 AEROSOL, METERED RESPIRATORY (INHALATION)
Status: CANCELLED
Start: 2022-09-28

## 2022-09-28 RX ORDER — LEVETIRACETAM 1000 MG/1
1000 TABLET ORAL 2 TIMES DAILY
Qty: 60 TABLET | Refills: 0 | Status: SHIPPED | OUTPATIENT
Start: 2022-09-28 | End: 2022-10-31

## 2022-09-28 RX ORDER — DIPHENHYDRAMINE HYDROCHLORIDE 50 MG/ML
50 INJECTION INTRAMUSCULAR; INTRAVENOUS
Status: CANCELLED
Start: 2022-10-26

## 2022-09-28 RX ADMIN — BEVACIZUMAB 1400 MG: 400 INJECTION, SOLUTION INTRAVENOUS at 13:15

## 2022-09-28 RX ADMIN — SODIUM CHLORIDE 250 ML: 9 INJECTION, SOLUTION INTRAVENOUS at 13:14

## 2022-09-28 ASSESSMENT — PAIN SCALES - GENERAL: PAINLEVEL: NO PAIN (0)

## 2022-09-28 NOTE — LETTER
9/28/2022         RE: Connor Emerson  [address]  [facility] [address]        Dear Colleague,    Thank you for referring your patient, Connor Emerson, to the [facility]. Please see a copy of my visit note below.    MEDICAL ONCOLOGY FOLLOW UP NOTE    PATIENT NAME: Connor Emerson  ENCOUNTER DATE: 9/28/2022    Care Team  Primary Oncologist: Bassam Persaud MD    REASON FOR CURRENT VISIT: F/u of lung cancer    HISTORY OF PRESENT ILLNESS:  Mr. Connor Emerson is a 42 year old  male who is a non-smoker with PMHx of T2DM, HTN with metastatic NSCLC comes for follow up     Oncologic Hx:    Diagnosis:   Stage IV NSCLC, Rt lung adenocarcinoma with metastasis to pleura, mediastinum , rt pleural effusion and brain diagnosed 1/2022 (AJCC 8th edition)  PD-L1 TPS 2-3% by [place]   NGS [facility] panel-EML4:ALK rearragement  NGS Guardant- GNAS R201H, KRAS K5E- No ALK    Treatment:   Current:  3/2/22- now- Alectinib 600 mg BID (Dose reduced to 450 mg BID due to grade 3 myalgias 3/21/22)  9/28/22- Bevacizumab for radiation necrosis  )  Past:  2/15/22- GK to 11 briain lesions    Intent of treatment: Palliative    Oncologic course:  1/19/22 to 1/22/22-Admitted to [facility] for 2 week progressive SOB secondary to have large rt sided pleural effusion, needing thoracentesis x2 (1.7L and 2.0 L removed), cytology positive for malignancy, adenocarcinoma.   1/26/22- Rt pleural mass biopsy-Dr. Agrawal--POSITIVE FOR ADENOCARCINOMA CONSISTENT WITH LUNG PRIMARY, admixed with mesothelial hyperplasia and inflammatory infiltrate (+ TTF-1 and CK 7;  negative  p40, calretinin and WT-1. PAX8 immunostain focal +). 4th thoracentesis done simultaneously - 3L approx removed.   2/1/22- PET/CT-Right lower lobe central infiltrative FDG avid 8.2 x 9.6 cm mass representing a primary lung adenocarcinoma. Ipsilateral right perihilar, bilateral pretracheal, subcarinal and superior mediastinal michele metastases. Contralateral mildly FDG  avid few lung nodules are suspicious for contralateral metastasis. At least 3 intracranial metastases in the right frontal lobe, left frontal lobe and left cerebellar hemisphere. Nonspecific mild diffuse bone marrow uptake. Further evaluation with a spine MRI could be considered to rule out early marrow infiltration. This could also be seen with red marrow conversion.  2/5/22-  Brain MRI- At least 9 intracranial metastases as detailed above. The dominant lesions involving the orbital right frontal lobe, the posterior left middle frontal gyrus, anterior right temporal lobe and in the left cerebellar hemisphere have surrounding moderate vasogenic type edema.  2/15/22- Saw Dr. Arango from Rad Onc- Rcd GK to 12 lesion in bran  2/16/22- Pleurex placement   3/2/22- Started Alectinib 600 mg BID  3/21/22- Dose reduced to 450 mg BID due to grade 3 myalgias and fatigue  4/2/22 to 4/5/22- Admitted at [facility] for- Severe sepsis due to MSSA infection of right PleurX catheter s/p removal- He presented with onset of pain at tube site starting 4/1; at arrival was tachycardic with leukocytosis (22.7) and elevated lactic acid (2.9).  CT chest showed fluid and stranding tracking outside the pleural space into chest wall along pleural catheter.  IR was consulted and removed catheter 4/2 with report of pustular drainage and tip culture growing MSSA.  Thoracic Surg was consulted who felt no surgical indication necessary given minimal pleural fluid and lack of any signs of abscess.  Initially treated with broad spectrum coverage for sepsis, narrowed to Ancef once sensitivities returned with plan to transition to cefadroxil for an additional 10 days at discharge per ID. Held drug 4/2 to 4/11 5/2/22- CT CAP- Overall, positive response to therapy with decreased size of right lower lobe and right pleural-based masses, pulmonary metastases, hilar and mediastinal lymphadenopathy. However, a single right posterior pleural-based mass has  slightly increased in size since 2/24/2022. No metastatic disease in the abdomen and pelvis. Right Pleurx catheter has been removed. Trace right pleural effusion and right basilar atelectasis.  5/2/22- Brain MRI- The previously demonstrated brain metastases are mildly diminished in size versus to 2/5/2022. The degree of edema is also diminished but not completely resolved. Probable trace amounts of intralesional bleeding demonstrated on the gradient sequence within the metastases. No definite new metastasis or progressive mass effect. No hydrocephalus or infarct.    6/15/22 to 6/17/22- Admitted at [facility]-with aphasia and word finding difficulty over last few weeks.  He presented to [facility] ED on 6/10 for evaluation of his symptoms. MRI brain showed multiple intracranial metastases, with interval enlargement of the dominant lesion within the left frontal lobe and increased surrounding vasogenic edema with 2 mm rightward shift of the septum pellucidum. Due to his worsening anxiety, he left AMA. His symptoms continued to progress to where he could not write at work so he decided to go to the ED for re-evaluation and treatment. Evaluated by [facility] Rad Onc (radiaiton necrosis vs tumor progression).  6/16/22- MR Brain (6/16) shows multiple intracranial metastases, with interval enlargement  of the dominant lesion within the left frontal lobe and increased surrounding vasogenic edema with 2 mm rightward shift of the septum pellucidum.  6/16/22- - CT CAP shows slightly decreased size of right lower lobe and right pleural-based masses. No new pulmonary nodules or lymphadenopathy; No evidence of metastatic disease in the abdomen or pelvis.   6/28/22 to 6/30/22- Admitted at [facility]- Elective left Stealth craniotomy with resection of brain tumor due to ongoing symptoms. No intraoperative complications. EBL 50 ml.  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  7/5/22- Ct CAP- Right lower lobe low-density nodules are not  significantly changed. A small left upper lobe pulmonary nodule is also unchanged. Trace pleural fluid on the right has increased slightly. No convincing evidence for metastatic disease in the abdomen or pelvis.  7/18/22 to 7/19/22- Admitted to [facility] for seizure- Reportedly was only taking once Keppra instead of twice daily. Also resumed on dexamethasone 2 mg daily  8/1/22- Brain MRI- Redemonstrated postsurgical changes status post left frontoparietal Craniotomy. Interval increase in size of the dominant ring-enhancing lesion in the left posterior superior frontal lobe with increased moderate surrounding vasogenic edema and local mass effect resulting in narrowing of the supratentorial ventricular system. No significant midline shift/herniation at this time  8/1/22- Dex was increased to 4 mg daily by Dr. Moran  9/1/22- CT Chest- Near resolution of previously seen right pleural nodule. Stable right lower lobe pulmonary nodule    He works as a maintenance manger for apartment complexes    Interval Hx:  Connor is here prior to starting Bevacizumab for Radiation necrosis. There was a delay in getting insurance approval.  On Alectinib 450 mg twice daily, has been out for 3 days  He continues to be on Dex 4 mg daily and having a hard time controlling BS, range between 200-400  He checks his BS twice daily, takes long acting 30 units, and also sliding scale for correction  Today AM it was 270  The insulin is managed by PCP and now also following endocrinology  Also on Keppra 1000 mg bID.  He seems irritable and   Family is afraid to be around him or talk to him  He was started on lisioprila nd hydrochlorothiazide for HTN  Not sleeping great at night intermittently  No dark/tarry stools   Breathing is stable, no cough, no fever or chills.  Has noticed some wheezing recently  Has exertional SOB on takign flight of stairs  Continues to have pain in the hips, legs and upper torso   He continues to work daily,  independent of ADL  and IADL    REVIEW OF SYSTEMS: 14 point ROS negative other than the symptoms noted above in the HPI.    Wt Readings from Last 4 Encounters:   09/13/22 98 kg (216 lb)   09/12/22 98.9 kg (218 lb)   08/29/22 98.6 kg (217 lb 6.4 oz)   07/18/22 94.4 kg (208 lb 1.6 oz)      Review of Systems:  A comprehensive ROS was performed and found to be negative or non-contributory with the exception of that noted in the HPI above.    Past Medical History:  GERD  Hypertension, not on medication  Type 2 diabetes mellitus, not on medications currently, previously on Metformin    Past Surgical History:  Past Surgical History:   Procedure Laterality Date     BRONCHOSCOPY RIGID OR FLEXIBLE W/TRANSENDOSCOPIC ENDOBRONCHIAL ULTRASOUND GUIDED Bilateral 1/26/2022    Procedure: Right BRONCHOSCOPY, FIBEROPTIC, endobronchial ultrasound, pleural biopsy;  Surgeon: Dallin Agrawal MD;  Location: [facility] OR     INJECT BLOCK MEDIAL BRANCH CERVICAL/THORACIC/LUMBAR       INSERT CHEST TUBE Right 2/16/2022    Procedure: INSERTION, CATHETER, INTERCOSTAL, FOR DRAINAGE;  Surgeon: Dallin Agrawal MD;  Location: [facility] GI     INSERT CHEST TUBE Right 3/9/2022    Procedure: INSERTION, CATHETER, INTERCOSTAL, FOR DRAINAGE;  Surgeon: Sushila Antonio MD;  Location: [facility] GI     IR CHEST TUBE REMOVAL TUNNELED RIGHT  4/2/2022     OPTICAL TRACKING SYSTEM CRANIOTOMY, EXCISE TUMOR, COMBINED Left 6/28/2022    Procedure: Left stealth craniotomy for tumor resection with motor mapping;  Surgeon: Stephen Moran MD;  Location:  OR     ORTHOPEDIC SURGERY      Ganesh. Rotator cuff repair.     PLEUROSCOPY N/A 1/26/2022    Procedure: Pleuroscopy with Pleural Biopsy;  Surgeon: Dallin Agrawal MD;  Location:  OR       Social History:  Lives with wife and 4 kids in [place]. Works as a  for an apartment complex in [place]. Exposure to household chemicals and . No significant exposure to asbestos. No signal exposure to benzene or similar chemicals. No  "significant smoking history-states that he smoked 1 to 2 cigarettes occasionally per month for about 2 years in college, non-smoking since then. No significant alcohol use history. No other recreational substances. Good support system. Kids are 23, 19, 17 and 13.    Family History  Significant history for cancers on maternal side. Mother  of uterine cancer. 2 maternal uncles have possible metastatic melanoma.    Outpatient Medications:  Current Outpatient Medications   Medication     alcohol swab prep pads     alectinib (ALECENSA) 150 MG CAPS     blood glucose (NO BRAND SPECIFIED) test strip     blood glucose calibration (NO BRAND SPECIFIED) solution     blood glucose monitoring (NO BRAND SPECIFIED) meter device kit     citalopram (CELEXA) 20 MG tablet     Continuous Blood Gluc  (FREESTYLE IRENE 2 READER) YVES     Continuous Blood Gluc Sensor (FREESTYLE IRENE 2 SENSOR) MISC     dexamethasone (DECADRON) 4 MG tablet     hydrochlorothiazide (HYDRODIURIL) 25 MG tablet     insulin aspart (NOVOLOG PEN) 100 UNIT/ML pen     insulin glargine (LANTUS PEN) 100 UNIT/ML pen     insulin pen needle (31G X 8 MM) 31G X 8 MM miscellaneous     levETIRAcetam (KEPPRA) 1000 MG tablet     lisinopril (ZESTRIL) 10 MG tablet     methocarbamol (ROBAXIN) 500 MG tablet     oxyCODONE (ROXICODONE) 5 MG tablet     prochlorperazine (COMPAZINE) 10 MG tablet     thin (NO BRAND SPECIFIED) lancets     No current facility-administered medications for this visit.       Physical Exam:    Blood pressure (!) 149/90, pulse 74, temperature 98.1  F (36.7  C), temperature source Oral, resp. rate 16, height 1.753 m (5' 9.02\"), weight 98.1 kg (216 lb 4.8 oz), SpO2 99 %.   General: alert and cooperative, sitting up in chair  HEENT: sclera anicteric, EOMI, MMM. Pupils equal and reactive.   Resp: breathing comfortably   GI: soft, non-tender, non-distended, bowel sounds present and normoactive  MSK: warm and well-perfused, normal tone  Skin: no rashes on " limited exam, no jaundice  Neuro: Alert awake and oriented x4, strength is 5 on 5 throughout, sensations are grossly intact    Labs & Studies: I personally reviewed the following studies:  Most Recent 3 CBC's:  Recent Labs   Lab Test 09/13/22  0852 09/12/22  1642 09/01/22  0839   WBC 11.1* 13.8* 4.1   HGB 14.3 14.4 6.2*   MCV 97 94 100    252 126*     Most Recent 3 BMP's:  Recent Labs   Lab Test 09/13/22  0852 09/12/22  1642 09/01/22  0839    140 137   POTASSIUM 4.2 3.6 3.5   CHLORIDE 103 100 101   CO2 31 28 30   BUN 15 20.3* 19   CR 0.58* 0.66* 0.56*   ANIONGAP 6 12 6   TORY 9.1 9.7 8.0*   * 374* 430*    Most Recent 2 LFT's:  Recent Labs   Lab Test 09/13/22  0852 09/12/22  1642   AST 11 20   ALT 36 31   ALKPHOS 109 105   BILITOTAL 0.9 0.7    Most Recent TSH and T4:  Recent Labs   Lab Test 09/12/22  1642   TSH 2.56          ASSESSMENT AND PLAN:  Stage IV NSCLC, Rt lung adenocarcinoma with metastasis to pleura, mediastinum , rt pleural effusion and brain diagnosed 1/2022 (AJCC 8th edition)  PD-L1 TPS 2-3% by [place]   NGS [facility] panel-EML4:ALK rearragement; chr2:[id number], chr2:[id number]  NGS Guardant- GNAS R201H, KRAS K5E- No ALK    He began Alectinib 600 mg BID 3/2/22 and unfortunately developed grade 3 myalgias which have improved with lowering the dose 450 mg BID. He was holding drug 4/2 to 4/11 due to MSSA infection from pleurex which is removed. Resumed at 450 mg BID. 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  Initial CT with good ME in the chest. Had pleuretic right sided chest pain so CT chest on 9/1 which shows ongoing ME in chest. Due to ongoing myalgias (Although CK is normal), we will dose reduce to 300 mg BID, can consider going back up 450 mg BID if he improves. Labs wnl today.  Refilled alectinib today, had missed for last 3 days  -Continue alectinib 300 mg BID  -Other appts with me as  scheduled    Plan  Arrange for future infusions for bevacizumab   Cancel appt with me on 10/12   Appt with Np/PA prior to cycle 2 of Bevacizumab on 10/19   Ct already scheduled   RTC with me after CT (move appt to Oct 31)      # Brain mets:  # Radiation necrosis  Baseline Brain MRI with several brain mets, s/p GK to 11-12 brain mets. F/u Brain MRI in June was showing enlargement of the one of the lesions along with edema, therefore had to undergo craniotomy followed by resection, the final biopsy consistent with radiation necrosis.  Has been having issues with controlling the radiation necrosis and swelling requiring steroids, currently on 4 mg of dexamethasone which is managed by neurosurgery.  -Will start bevacizumab for radiation necrosis so that we can wean off of the steroids and control the radiation necrosis--15 mg/kg every 3 weeks, plan for at least 3 months of therapy. Will discuss with Dr. Moran about weaning dexamethasone,  There is good data on safety of bevacizumab with alectinib.  Discussed potential SE of bevacizumab today. He will check his BP at home regularly.  Https://pubmed.ncbi.nlm.nih.gov/34365203/  -my plan is to continue bevacizumab every 3 weeks and get a follow-up MRI possibly in early November.  At the time we can consdier tapering the dexamethasone to 2 mg daily for 2-3 weeks then stop it, Will confirm plan with Dr. Moran.   -Next Brain MRI 10/26/2022 ordered      #grade 3 myalgias: resolved after lowering dose, now seems to have recrudescence,  moderate in severity, will decrease dose to 300 mg BID    #normocytic anemia: sudden drop to <7, requiring 1 unit blood transfusion. Lab work up unrevealing. Has recovered to baseline today at 14  -recheck hgb q2 weeks x3   -consider EGD if hgb drops again, risk for GI bleed with chronic steroid use      # T2 DM- A1c 10.1.  PTA Lantus 30 units bid and has novolog, recent increase in BS following increase in dex to 4 mg.   BS not currently well  controlled, range b/w 20--400, dex not helping.   -F/u with PCP for BS , will most likley need further adjustment, may be add something scheduled at meal time and sliding scale in addition to it      #grade 2 fatigue: worse since holding alectinib and in spite of drastic hgb improvement. Will check TSH.     #MSSA infection, Sepsis at pleurex site- resolved  # Pleural effusion: s/p pleurex palcement  2/16/22. Then on 4/2 right PleurX catheter s/p removal for severe sepsis due to MSSA infection.    # HTN- conitnue hydrochlorothiazide and lisinopril  -Monitor BP at home and will f/u with PCP     #dry eyes  #blurred vision  Continue artificial tears. Saw ophthalmology previosuly, changed prescription, mow improved.  No neuro sx.     #Psychiatry  # Situational Anxiety   - Feels more irritable lately--steroids contributing. Declined referral to therapist and denies medication intervention for now. PCP can help manage this too if he has established relationship with him    #COVID vaccine: No COVID vaccine on record, discuss at next visit    On the day of service  Chart review: 5 minutes  Visit duration: 30 minutes  Care coordination: 5 minutes        Again, thank you for allowing me to participate in the care of your patient.      Sincerely,    Bassam Persaud MD

## 2022-09-28 NOTE — PROGRESS NOTES
Infusion Nursing Note:  Connor Emerson presents today for C1D1 Bevacizumab.    Patient seen by provider today: Yes: Dr. Persaud   present during visit today: Not Applicable.    Note: Connor presents to infusion today feeling well following his clinic appointment. Denies any questions or concerns.    Patient new to oncology infusion room and is receiving Avastin for the first time. Pt oriented to infusion room, including chair, nutrition station and call light. Writer reinforced chemotherapy teaching/side effects and schedule. Patient declined any additional printed information on Avastin.    Pt instructed to call care coordinator, triage (or MD on call if after hours/weekends) with chills/temp >=100.5, questions/concerns. Pt stated understanding of plan.     Intravenous Access:  Peripheral IV placed.    Treatment Conditions:  Results reviewed, labs MET treatment parameters, ok to proceed with treatment.  Urine: Negative.  BP WDL.    Post Infusion Assessment:  Patient tolerated infusion without incident.  Blood return noted pre and post infusion.  Site patent and intact, free from redness, edema or discomfort.  No evidence of extravasations.  Access discontinued per protocol.     Discharge Plan:   Patient declined prescription refills.  Discharge instructions reviewed with: Patient and Family.  Patient and/or family verbalized understanding of discharge instructions and all questions answered.  Copy of AVS reviewed with patient and/or family.  Patient will return 10/19 for next appointment.  Patient discharged in stable condition accompanied by: wife.  Departure Mode: Ambulatory.      Teri Franz RN

## 2022-09-28 NOTE — PATIENT INSTRUCTIONS
Contact Numbers  [facility]: [phone] (for symptom and scheduling needs)    Please call the [facility] Triage line if you experience a temperature greater than or equal to 100.4, shaking chills, have uncontrolled nausea, vomiting and/or diarrhea, dizziness, shortness of breath, chest pain, bleeding, unexplained bruising, or if you have any other new/concerning symptoms, questions or concerns.     If you are having any concerning symptoms or wish to speak to a provider before your next infusion visit, please call your care coordinator or triage to notify them so we can adequately serve you.     If you need a refill on a narcotic prescription or other medication, please call triage before your infusion appointment.             Lab Results:  Recent Results (from the past 12 hour(s))   Protein qualitative urine    Collection Time: 09/28/22 11:16 AM   Result Value Ref Range    Protein Albumin Urine Negative Negative mg/dL   Potassium    Collection Time: 09/28/22 11:16 AM   Result Value Ref Range    Potassium 4.2 3.4 - 5.3 mmol/L   Magnesium    Collection Time: 09/28/22 11:16 AM   Result Value Ref Range    Magnesium 1.8 1.7 - 2.3 mg/dL   CK total    Collection Time: 09/28/22 11:16 AM   Result Value Ref Range     39 - 308 U/L

## 2022-09-28 NOTE — NURSING NOTE
"Oncology Rooming Note    September 28, 2022 11:24 AM   Connor Emerson is a 42 year old male who presents for:    Chief Complaint   Patient presents with     Blood Draw     Labs drawn with piv start by rn.  VS taken.     Oncology Clinic Visit     [facility] RETURN - NON SMALL CELL LUNG CANCER     Initial Vitals: BP (!) 149/90 (BP Location: Right arm, Patient Position: Sitting, Cuff Size: Adult Large)   Pulse 74   Temp 98.1  F (36.7  C) (Oral)   Resp 16   Ht 1.753 m (5' 9.02\")   Wt 98.1 kg (216 lb 4.8 oz)   SpO2 99%   BMI 31.93 kg/m   Estimated body mass index is 31.93 kg/m  as calculated from the following:    Height as of this encounter: 1.753 m (5' 9.02\").    Weight as of this encounter: 98.1 kg (216 lb 4.8 oz). Body surface area is 2.19 meters squared.  No Pain (0) Comment: Data Unavailable   No LMP for male patient.  Allergies reviewed: Yes  Medications reviewed: Yes    Medications: Medication refills not needed today.  Pharmacy name entered into EPIC:    [facility] - [place], [place] - [address] [facility] [place], [place] [address] [place] [address] AVE   [facility] [place] [id number] - [place], [place] - [address] [facility] [place] [address] & [id number] [place], [place] - [address] AT [facility] & [place] [address]    Clinical concerns: No new concerns. Cori was notified.      Jose Cai LPN            "

## 2022-09-29 NOTE — PROGRESS NOTES
Medication Therapy Management (MTM) Encounter    ASSESSMENT:                            Medication Adherence/Access: See below for considerations    History of metastatic lung cancer, multiple brain metastases: dexamethasone and bevacizumab (26% incidence per UpToDate) can elevated blood sugars, see below for diabetes.  Continues to work with oncology and neurosurgery.    Seizure:  stable    Depression: Patient would benefit from starting citalopram.    Type 2 Diabetes: Patient is not meeting A1c goal of < 7%. Patient is not meeting average glucose goal of <150mg/dL. Patient would benefit from Basal Insulin (Lantus) :  increase dose to 35 units twice daily, Bolus / Rapid Acting Insulin (Novolog) : increase adjustment scale dose to 1:25 and Increase in home glucose monitoring, we placed continous glucose monitor on in clinic today.  Per rule of 1800 needs 1 unit to reduce insulin by 25mg/dL and 1 units to cover 7 grams of carbohydrates.  With blood sugars being high overall will increase Lantus.  Can consider other medications but with dexamethasone being weaned off would like to see what the continous glucose monitor gives for numbers next week.    Per [employer] [place] online formulary, some preferred options: Bydureon, Victoza, Jardiance, Tradjenta, pioglitazone, glipizide Recommend against metformin due to history of needing imaging for cancer.    Hypertension: Patient is not meeting blood pressure goal of < 130/80mmHg. Patient would benefit from the following changes - increasing the dose of lisinopril and recheck basic metabolic panel at his October labs.    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 Patient is not on moderate intensity statin which is indicated based on 2019 ACC/AHA guidelines for lipid management.        Vaccines: Per due for COVID-19, Prevnar-20, Tdap, Hepatitis B vaccines.  He  "reports he has not had vaccines discussed with him.  Per CDC \"Vaccines might be less effective during the period of altered immunocompetence. Non-live vaccines might best be deferred during a period of altered immunocompetence; in this circumstance, the concern is with effectiveness and not safety. Additionally, if a non-live vaccine is administered during the period of altered immunocompetence, it might need to be repeated after immune function has improved.\"      PLAN:                            Diabetes:   1.   Increase Lantus 35 units twice daily (gray/silver)  2.  Novolog (blue/orange)  An insulin correction scale use 1 unit rapid-acting insulin (Humalog or Novolog) to correct for every 25 mg/dL that blood glucose is above 150 mg/dL.    <50 mg/dL:           take no insulin injections  50-69 mg/dL:        minus 4 units of insulin  70-99 mg/dL:        minus 2 units   100-150 mg/dL:    no adjustment  150-174 mg/dL:    add 1 unit of insulin  175-199 mg/dL:    add 2 units  200-224 mg/dL:    add 3 units   225-249 mg/dL:    add 4 units    250-274 mg/dL:    add 5 units  275-299 mg/dL:    add 6 units  300-324 mg/dL:    add 7 units  325-349 mg/dL:    add 8 units  350-374 mg/dL:    add 9 units  375-399 mg/dL:    add 10 units  >400 mg/dL:         add 11 units and call your doctor    High Blood Pressure:   Lisinopril increase to 2 of the 10mg tablets, 20mg every day   Recheck basic metabolic panel with starting lisinopril in October    Depression: start citalopram 20mg once daily    Cholesterol:  Start rosuvastatin 5mg once daily     Vaccines:  Ask oncology about vaccines    Follow-up: Return in about 1 week (around 10/10/2022) for [facility] Pharmacist Visit, phone visit.    SUBJECTIVE/OBJECTIVE:                          Connor Emerson is a 42 year old male coming in person for a follow up visit.  He was last seen by MTM 7/7/22       Reason for visit: continous glucose monitor placement and diabetes.    Allergies/ADRs: Reviewed in " chart  Past Medical History: Reviewed in chart  Tobacco: He reports that he has never smoked. He has never used smokeless tobacco.  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  His wife sets them up for him.  Per patient, misses medication 0 times per week.   Medication barriers: none.   The patient fills medications at [place]: No, he goes to [facility].     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  He reports he had to leave works after the bevacizumab last week  Certified for Medical Marijuana 22  Following up with Neurosurgery Dr. Moran and follows with Oncology Dr. Persaud      Hemoglobin   Date Value Ref Range Status   2022 14.3 13.3 - 17.7 g/dL Final   2017 15.2 13.3 - 17.7 g/dL Final   ]    Seizure:  Current medication is levetiracetam/Keppra 1000mg twice daily. No seizures reported  Side effects: none     Depression:  Current medications include: none, he has not been taking Citalopram 20mg once daily. Patient reports he is very irritable, his wife can not stand to be around him this past month. No thoughts of harming himself.  SADIA-7 SCORE 2020   Total Score - 6 (mild anxiety)   Total Score 2 6     Type 2 Diabetes:  Currently taking Lantus 30 units twice a day, Novolog adjustment scale 1 unit per 50 over 150 mg/dL, takes after breakfast and after dinner, right after eating. Steroid are increasing blood sugars.  Patient is not experiencing side effects.  Blood sugar monitorin time(s) daily. Ranges  (patient reported): 300 mg/dL  This morning 260  The other morning 240  Hypoglycemia: reporting none  Hyperglycemia: he is fatigued  Eye exam: done ~May  Foot exam: due  Diet/Exercise: twice  Aspirin: No  Statin: No   ACEi/ARB: No.   Urine Albumin:   Lab Results   Component Value Date    UCRR 23 02/24/2022    MICROL <5 02/24/2022    UMALCR  02/24/2022      Comment:      Unable to calculate:  Urine creatinine or albumin value below detectable level     Lab Results   Component Value Date    A1C 12.3 06/24/2022    A1C 10.1 01/19/2022    A1C 11.9 05/12/2020    A1C 6.3 08/10/2018    A1C 6.5 11/27/2017    A1C 6.4 06/29/2017    A1C 6.5 02/13/2017     Estimated Creatinine Clearance: 187.8 mL/min (A) (based on SCr of 0.58 mg/dL (L)).  GFR Estimate   Date Value Ref Range Status   09/13/2022 >90 >60 mL/min/1.73m2 Final     Comment:     Effective December 21, 2021 eGFRcr in adults is calculated using the 2021 CKD-EPI creatinine equation which includes age and gender (Minh et al., NEJM, DOI: 10.1056/BZWZhe5130552)   05/12/2020 >90 >60 mL/min/[1.73_m2] Final     Comment:     Non  GFR Calc  Starting 12/18/2018, serum creatinine based estimated GFR (eGFR) will be   calculated using the Chronic Kidney Disease Epidemiology Collaboration   (CKD-EPI) equation.       GFR, ESTIMATED POCT   Date Value Ref Range Status   09/01/2022 >60 >60 mL/min/1.73m2 Final     GFR Estimate If Black   Date Value Ref Range Status   05/12/2020 >90 >60 mL/min/[1.73_m2] Final     Comment:      GFR Calc  Starting 12/18/2018, serum creatinine based estimated GFR (eGFR) will be   calculated using the Chronic Kidney Disease Epidemiology Collaboration   (CKD-EPI) equation.       Wt Readings from Last 4 Encounters:   10/03/22 218 lb 9.6 oz (99.2 kg)   09/28/22 216 lb 4.8 oz (98.1 kg)   09/13/22 216 lb (98 kg)   09/12/22 218 lb (98.9 kg)     Estimated body mass index is 31.93 kg/m  as calculated from the following:    Height as of  "9/28/22: 5' 9.02\" (1.753 m).    Weight as of 9/28/22: 216 lb 4.8 oz (98.1 kg).     Component      Latest Ref Rng & Units 9/29/2015   C-Peptide      0.9 - 6.9 ng/mL 6.4     Hypertension: Current medications include hydrochlorothiazide 25mg every day, lisinopril 10mg every day (started 9/13/22) takes at 6:30am.  Patient reports no current medication side effects.  BP Readings from Last 3 Encounters:   09/28/22 (!) 149/90   09/13/22 (!) 144/96   09/12/22 136/88     Potassium   Date Value Ref Range Status   09/28/2022 4.2 3.4 - 5.3 mmol/L Final   09/13/2022 4.2 3.4 - 5.3 mmol/L Final   05/12/2020 3.9 3.4 - 5.3 mmol/L Final       Hyperlipidemia: Current therapy includes no current medications.   The 10-year ASCVD risk score (Micjon HOLBROOK Jr., et al., 2013) is: 6.7%    Values used to calculate the score:      Age: 42 years      Sex: Male      Is Non- : No      Diabetic: Yes      Tobacco smoker: No      Systolic Blood Pressure: 133 mmHg      Is BP treated: No      HDL Cholesterol: 33 mg/dL      Total Cholesterol: 208 mg/dL          Recent Labs   Lab Test 02/24/22  1224 08/10/18  1110 11/11/16  1051 09/29/15  1604 07/14/14  1437   CHOL 208* 182   < > 184 168   HDL 33* 36*   < > 33* 34*   * 121*   < > 110 113   TRIG 338* 126   < > 204* 105   CHOLHDLRATIO  --   --   --  5.6* 5.0    < > = values in this interval not displayed.       Today's Vitals: BP (!) 142/96   Pulse 82   Wt 218 lb 9.6 oz (99.2 kg)   SpO2 97%   BMI 32.27 kg/m    ----------------    I spent 35 minutes with this patient today. All changes were made via collaborative practice agreement with Radha Shetty, JERRY CNP. A copy of the visit note was provided to the patient's provider(s).    The patient was given a summary of these recommendations.     Ester Carl PharmD [facility]  Medication Therapy Management Practitioner   #[phone]     Medication Therapy Recommendations  Adjustment disorder with mixed anxiety and depressed " mood    Current Medication: citalopram (CELEXA) 20 MG tablet   Rationale: Does not understand instructions - Adherence - Adherence   Status: Patient Agreed - Adherence/Education         HTN, goal below 140/90    Current Medication: lisinopril (ZESTRIL) 10 MG tablet (Discontinued)   Rationale: Dose too low - Dosage too low - Effectiveness   Recommendation: Increase Dose   Status: Accepted per CPA         Hyperlipidemia LDL goal <100    Current Medication: rosuvastatin (CRESTOR) 5 MG tablet   Rationale: Untreated condition - Needs additional medication therapy - Indication   Recommendation: Start Medication   Status: Accepted per CPA         Type 2 diabetes mellitus with hyperglycemia, with long-term current use of insulin (H)    Current Medication: insulin glargine (LANTUS PEN) 100 UNIT/ML pen   Rationale: Dose too low - Dosage too low - Effectiveness   Recommendation: Increase Dose - Increase Lantus and Novolog   Status: Accepted per CPA

## 2022-10-03 ENCOUNTER — OFFICE VISIT (OUTPATIENT)
Dept: NEUROSURGERY | Facility: CLINIC | Age: 42
End: 2022-10-03
Attending: NEUROLOGICAL SURGERY
Payer: COMMERCIAL

## 2022-10-03 ENCOUNTER — OFFICE VISIT (OUTPATIENT)
Dept: PHARMACY | Facility: CLINIC | Age: 42
End: 2022-10-03
Payer: COMMERCIAL

## 2022-10-03 VITALS — OXYGEN SATURATION: 95 % | SYSTOLIC BLOOD PRESSURE: 181 MMHG | HEART RATE: 83 BPM | DIASTOLIC BLOOD PRESSURE: 111 MMHG

## 2022-10-03 VITALS
BODY MASS INDEX: 32.27 KG/M2 | DIASTOLIC BLOOD PRESSURE: 96 MMHG | SYSTOLIC BLOOD PRESSURE: 142 MMHG | WEIGHT: 218.6 LBS | OXYGEN SATURATION: 97 % | HEART RATE: 82 BPM

## 2022-10-03 DIAGNOSIS — C79.31 BRAIN METASTASIS: ICD-10-CM

## 2022-10-03 DIAGNOSIS — C34.31 MALIGNANT NEOPLASM OF LOWER LOBE OF RIGHT LUNG (H): ICD-10-CM

## 2022-10-03 DIAGNOSIS — R56.9 SEIZURE (H): ICD-10-CM

## 2022-10-03 DIAGNOSIS — E78.5 HYPERLIPIDEMIA LDL GOAL <100: ICD-10-CM

## 2022-10-03 DIAGNOSIS — C79.31 BRAIN METASTASIS: Primary | ICD-10-CM

## 2022-10-03 DIAGNOSIS — Z71.85 VACCINE COUNSELING: ICD-10-CM

## 2022-10-03 DIAGNOSIS — E11.65 TYPE 2 DIABETES MELLITUS WITH HYPERGLYCEMIA, WITH LONG-TERM CURRENT USE OF INSULIN (H): ICD-10-CM

## 2022-10-03 DIAGNOSIS — I10 HTN, GOAL BELOW 140/90: Primary | ICD-10-CM

## 2022-10-03 DIAGNOSIS — Z79.4 TYPE 2 DIABETES MELLITUS WITH HYPERGLYCEMIA, WITH LONG-TERM CURRENT USE OF INSULIN (H): ICD-10-CM

## 2022-10-03 PROCEDURE — 99606 MTMS BY PHARM EST 15 MIN: CPT | Performed by: PHARMACIST

## 2022-10-03 PROCEDURE — 99207 PR NO CHARGE LOS: CPT | Performed by: NEUROLOGICAL SURGERY

## 2022-10-03 PROCEDURE — 99607 MTMS BY PHARM ADDL 15 MIN: CPT | Performed by: PHARMACIST

## 2022-10-03 PROCEDURE — G0463 HOSPITAL OUTPT CLINIC VISIT: HCPCS

## 2022-10-03 RX ORDER — ROSUVASTATIN CALCIUM 5 MG/1
5 TABLET, COATED ORAL DAILY
Qty: 90 TABLET | Refills: 1 | Status: SHIPPED | OUTPATIENT
Start: 2022-10-03 | End: 2022-10-27 | Stop reason: SINTOL

## 2022-10-03 RX ORDER — LISINOPRIL 20 MG/1
20 TABLET ORAL DAILY
Qty: 30 TABLET | Refills: 2 | Status: SHIPPED | OUTPATIENT
Start: 2022-10-03 | End: 2022-10-27 | Stop reason: DRUGHIGH

## 2022-10-03 ASSESSMENT — PAIN SCALES - GENERAL: PAINLEVEL: SEVERE PAIN (6)

## 2022-10-03 NOTE — PROGRESS NOTES
"It was a pleasure to see Connor Emerson today in Neurosurgery Clinic. He is a 42 year old male who underwent:    Procedure Date: 06/28/2022     PREOPERATIVE DIAGNOSIS:  Brain metastasis from metastatic lung cancer, status post radiosurgery, recurrence versus radiation necrosis.     POSTOPERATIVE DIAGNOSIS:  Brain metastasis from metastatic lung cancer, status post radiosurgery, recurrence versus radiation necrosis.     PROCEDURE:  Left Stealth craniotomy with intraoperative SSEP phase reversal and resection of brain tumor with microdissection.     SURGEON:  Stephen Moran MD.     ASSISTANT:  Marva Coronel PA-C.     ANESTHESIA:  General endotracheal anesthesia.     ESTIMATED BLOOD LOSS:  50 mL.    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    Given the patient's radiation necrosis and significant edema as well as difficulty with steroids due to side effects he has started bevacizumab with Dr. Persaud and also has a steroid taper.  He is scheduled for a MRI later in October.    Vitals:    10/03/22 1413   BP: (!) 181/111   Pulse: 83   SpO2: 95%     Estimated body mass index is 32.27 kg/m  as calculated from the following:    Height as of 9/28/22: 1.753 m (5' 9.02\").    Weight as of an earlier encounter on 10/3/22: 99.2 kg (218 lb 9.6 oz).  Severe Pain (6)    Awake and alert.  Speech clear and fluent  Well-healed cranial incision.  No pronator drift.    Imaging: No new imaging.    Assessment: Status post craniotomy.    Plan: This point we will see what his MRI looks like later this month.  Hopefully a short course of Avastin will help treat his radiation necrosis.     "

## 2022-10-03 NOTE — PATIENT INSTRUCTIONS
Recommendations from today's MTM visit:                                                      Diabetes:   1.   Increase Lantus 35 units twice daily (gray/silver)  2.  Novolog (blue/orange)  An insulin correction scale use 1 unit rapid-acting insulin (Humalog or Novolog) to correct for every 25 mg/dL that blood glucose is above 150 mg/dL.    <50 mg/dL:           take no insulin injections  50-69 mg/dL:        minus 4 units of insulin  70-99 mg/dL:        minus 2 units   100-150 mg/dL:    no adjustment  150-174 mg/dL:    add 1 unit of insulin  175-199 mg/dL:    add 2 units  200-224 mg/dL:    add 3 units   225-249 mg/dL:    add 4 units    250-274 mg/dL:    add 5 units  275-299 mg/dL:    add 6 units  300-324 mg/dL:    add 7 units  325-349 mg/dL:    add 8 units  350-374 mg/dL:    add 9 units  375-399 mg/dL:    add 10 units  >400 mg/dL:         add 11 units and call your doctor    High Blood Pressure:   Lisinopril increase to 2 of the 10mg tablets, 20mg every day   Recheck basic metabolic panel with increase lisinopril in October    Depression: start citalopram 20mg once daily    Cholesterol:  Start rosuvastatin 5mg once daily     Vaccines:  Ask oncology about vaccines    Follow-up: Return in about 1 week (around 10/10/2022) for MTM Pharmacist Visit, phone visit.    To schedule another MTM appointment, please call the clinic directly or you may call the MTM scheduling line at [phone] or toll-free at [phone].     My Clinical Pharmacist's contact information:                                                      It was a pleasure seeing you today!  Please feel free to contact me with any questions or concerns you have.      Ester Carl, PharmD [facility]  Medication Therapy Management Practitioner   #[phone]    It was great to speak with you today.  I value your experience and would be very thankful for your time with providing feedback on our clinic survey. You may receive a survey via email or text message  in the next few days.

## 2022-10-03 NOTE — LETTER
"    10/3/2022         RE: Connor Emerson  [address]  [place] [address]        Dear Colleague,    Thank you for referring your patient, Connor Emerson, to the [facility] [place]. Please see a copy of my visit note below.    It was a pleasure to see Connor Emerson today in Neurosurgery Clinic. He is a 42 year old male who underwent:    Procedure Date: 06/28/2022     PREOPERATIVE DIAGNOSIS:  Brain metastasis from metastatic lung cancer, status post radiosurgery, recurrence versus radiation necrosis.     POSTOPERATIVE DIAGNOSIS:  Brain metastasis from metastatic lung cancer, status post radiosurgery, recurrence versus radiation necrosis.     PROCEDURE:  Left Stealth craniotomy with intraoperative SSEP phase reversal and resection of brain tumor with microdissection.     SURGEON:  Stephen Moran MD.     ASSISTANT:  Marva Coronel PA-C.     ANESTHESIA:  General endotracheal anesthesia.     ESTIMATED BLOOD LOSS:  50 mL.    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    Given the patient's radiation necrosis and significant edema as well as difficulty with steroids due to side effects he has started bevacizumab with Dr. Persaud and also has a steroid taper.  He is scheduled for a MRI later in October.    Vitals:    10/03/22 1413   BP: (!) 181/111   Pulse: 83   SpO2: 95%     Estimated body mass index is 32.27 kg/m  as calculated from the following:    Height as of 9/28/22: 1.753 m (5' 9.02\").    Weight as of an earlier encounter on 10/3/22: 99.2 kg (218 lb 9.6 oz).  Severe Pain (6)    Awake and alert.  Speech clear and fluent  Well-healed cranial incision.  No pronator drift.    Imaging: No new imaging.    Assessment: Status post craniotomy.    Plan: This point we will see what his MRI looks like later this month.  Hopefully a short course of Avastin will help treat his radiation necrosis.         Again, thank you for allowing me to participate " in the care of your patient.        Sincerely,        Stephen Moran MD

## 2022-10-03 NOTE — NURSING NOTE
"Connor Emerson is a 42 year old male who presents for:  Chief Complaint   Patient presents with     Follow Up     12 WK F/U. DOS: 6/28/22. Left stealth craniotomy for tumor resection with motor mapping         Initial Vitals:  BP (!) 181/111   Pulse 83   SpO2 95%  Estimated body mass index is 32.27 kg/m  as calculated from the following:    Height as of 9/28/22: 1.753 m (5' 9.02\").    Weight as of an earlier encounter on 10/3/22: 99.2 kg (218 lb 9.6 oz).. There is no height or weight on file to calculate BSA. BP completed using cuff size: regular  Severe Pain (6)    Nursing Comments:     Romina Espinoza MA    "

## 2022-10-03 NOTE — Clinical Note
Dr. Persaud, what do you think of vaccines for Connor? We will follow-up in 1 week regarding blood sugars, he is now using the continous glucose monitor which will help to adjust insulin easier.  Thanks Ester

## 2022-10-05 ENCOUNTER — TELEPHONE (OUTPATIENT)
Dept: ONCOLOGY | Facility: CLINIC | Age: 42
End: 2022-10-05

## 2022-10-05 NOTE — TELEPHONE ENCOUNTER
Oral Chemotherapy Monitoring Program     Placed call to patient in follow up of oral chemotherapy. Called Connor to see how he is feeling on the reduced dose of Alecensa. He said he has not received the new dose yet and has not heard from [facility] pharmacy. He is currently not taking any oral chemo as he waits for the shipment.     I asked Juliette to investigate the status of the delivery. Pt has been off the medication since ~9/25/22.     Maranda Calabrese, PharmD  Hematology/Oncology Clinical Pharmacist  [facility]  [facility]

## 2022-10-10 ENCOUNTER — TELEPHONE (OUTPATIENT)
Dept: PHARMACY | Facility: CLINIC | Age: 42
End: 2022-10-10

## 2022-10-10 NOTE — TELEPHONE ENCOUNTER
Called patient x 3.  No showed for telephone visit, unable to leave voicemail.  Sent mychart.  Would like to adjust insulin.

## 2022-10-11 ENCOUNTER — VIRTUAL VISIT (OUTPATIENT)
Dept: PHARMACY | Facility: CLINIC | Age: 42
End: 2022-10-11
Payer: COMMERCIAL

## 2022-10-11 DIAGNOSIS — F43.23 ADJUSTMENT DISORDER WITH MIXED ANXIETY AND DEPRESSED MOOD: Primary | ICD-10-CM

## 2022-10-11 DIAGNOSIS — I10 HTN, GOAL BELOW 140/90: ICD-10-CM

## 2022-10-11 DIAGNOSIS — E11.65 TYPE 2 DIABETES MELLITUS WITH HYPERGLYCEMIA, WITH LONG-TERM CURRENT USE OF INSULIN (H): ICD-10-CM

## 2022-10-11 DIAGNOSIS — C34.31 MALIGNANT NEOPLASM OF LOWER LOBE OF RIGHT LUNG (H): ICD-10-CM

## 2022-10-11 DIAGNOSIS — Z71.85 VACCINE COUNSELING: ICD-10-CM

## 2022-10-11 DIAGNOSIS — E78.5 HYPERLIPIDEMIA LDL GOAL <100: ICD-10-CM

## 2022-10-11 DIAGNOSIS — C79.31 BRAIN METASTASIS: ICD-10-CM

## 2022-10-11 DIAGNOSIS — Z79.4 TYPE 2 DIABETES MELLITUS WITH HYPERGLYCEMIA, WITH LONG-TERM CURRENT USE OF INSULIN (H): ICD-10-CM

## 2022-10-11 PROCEDURE — 99607 MTMS BY PHARM ADDL 15 MIN: CPT | Performed by: PHARMACIST

## 2022-10-11 PROCEDURE — 99606 MTMS BY PHARM EST 15 MIN: CPT | Performed by: PHARMACIST

## 2022-10-11 NOTE — PATIENT INSTRUCTIONS
Recommendations from today's MTM visit:                                                      Diabetes:    1.  Increase Lantus 38 units twice daily (gray/silver)  2.  Start Novolog 6 units at lunch and dinner plus adjustment scale 1:25 mg/dL  An insulin correction scale use 1 unit rapid-acting insulin (Humalog or Novolog) to correct for every 25 mg/dL that blood glucose is above 150 mg/dL.    <50 mg/dL:           take no insulin injections  50-69 mg/dL:        minus 4 units of insulin  70-99 mg/dL:        minus 2 units   100-150 mg/dL:    no adjustment  150-174 mg/dL:    add 1 unit of insulin  175-199 mg/dL:    add 2 units  200-224 mg/dL:    add 3 units   225-249 mg/dL:    add 4 units    250-274 mg/dL:    add 5 units  275-299 mg/dL:    add 6 units  300-324 mg/dL:    add 7 units  325-349 mg/dL:    add 8 units  350-374 mg/dL:    add 9 units  375-399 mg/dL:    add 10 units  >400 mg/dL:         add 11 units and call your doctor    3.  Turn off alarms on continous glucose monitor setting in his phone and start using this again.    Oncology is okay with non-live vaccines, you can schedule vaccines with [facility] or your pharmacy.  Suggest you start with influenza and pneumonia vaccines.    Follow-up: Return in 6 days (on 10/17/2022) for MTM Pharmacist Visit, phone visit.    To schedule another MTM appointment, please call the clinic directly or you may call the MTM scheduling line at [phone] or toll-free at [phone].     My Clinical Pharmacist's contact information:                                                      It was a pleasure seeing you today!  Please feel free to contact me with any questions or concerns you have.      Ester Carl, PharmD [facility]  Medication Therapy Management Practitioner   #[phone]    It was great to speak with you today.  I value your experience and would be very thankful for your time with providing feedback on our clinic survey. You may receive a survey via email  or text message in the next few days.

## 2022-10-11 NOTE — PROGRESS NOTES
"Medication Therapy Management (MTM) Encounter    ASSESSMENT:                            Medication Adherence/Access: spoke to pharmacy and insulin was covered with no issues.    History of metastatic lung cancer, multiple brain metastases: weaning off dexamethasone which may improve blood sugars and need to take into consideration  Continues to work with oncology and neurosurgery.    Depression: will need to continue citalopram for 4-6 weeks to determine efficacy    Type 2 Diabetes: Patient is not meeting average glucose goal of <150mg/dL. Patient would benefit from Basal Insulin (Lantus) :  increase dose by 10% to 38 units twice daily (difficult to adjust without blood sugars for last several days and Bolus / Rapid Acting Insulin (Novolog) : Start at dose : 6 units with lunch and dinner, not eating breakfast per adjustment scale.  Per rule of 500 1 units of insulin covers 6 grams of carbohydrates and rule of 1800 1:20.    Per [employer] [place] online formulary, some preferred options: Bydureon, Victoza, Jardiance, Tradjenta, pioglitazone, glipizide Recommend against metformin due to history of needing imaging for cancer.    Hypertension: Patient would benefit from the following changes - continued blood pressure monitoring.  No blood pressure today to review, basic metabolic panel scheduled to complete    Hyperlipidemia: Pt will be due for fasting labs in 2-3 months.     Vaccines: Per due for COVID-19, Prevnar-20, Tdap, Hepatitis B vaccines.  He reports he can get these scheduled  Per CDC \"Vaccines might be less effective during the period of altered immunocompetence. Non-live vaccines might best be deferred during a period of altered immunocompetence; in this circumstance, the concern is with effectiveness and not safety. Additionally, if a non-live vaccine is administered during the period of altered immunocompetence, it might need to be repeated after immune function has improved.\"    PLAN:                        "     Diabetes:    1.  Increase Lantus 38 units twice daily  2.  Start Novolog 6 units at lunch and dinner plus adjustment scale 1:25 mg/dL  3.  Turn off alarms on continous glucose monitor setting in his phone and start using this again.    Oncology is okay with non-live vaccines, you can schedule vaccines with [facility] or your pharmacy.  Suggest you start with influenza and pneumonia vaccines.    Follow-up: Return in 6 days (on 10/17/2022) for MTM Pharmacist Visit, phone visit.    SUBJECTIVE/OBJECTIVE:                          Connor Emerson is a 42 year old male called for a follow-up visit.  Today's visit is a follow-up MTM visit from 10/3/22     Reason for visit: reports Lantus/Novolog cost issue.  Sweaty and not feeling good    Allergies/ADRs: Reviewed in chart  Past Medical History: Reviewed in chart  Tobacco: He reports that he has never smoked. He has never used smokeless tobacco.  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  His wife sets them up for him.  Per patient, misses medication 0 times per week.   Medication barriers: none.   The patient fills medications at [place]: No, he goes to [facility].     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    Certified for Medical Marijuana 9/13/22  Following up with Neurosurgery Dr. Moran and follows with Oncology Dr. Persaud      Hemoglobin   Date Value Ref Range Status   09/13/2022 14.3 13.3 - 17.7 g/dL Final   04/27/2017 15.2 13.3 - 17.7 g/dL Final   ]    Depression:  Current medications include:  Citalopram 20mg once daily. Patient reports he was  able to start this medications last week. Side effects: nausea but improved  SADIA-7 SCORE 5/12/2020 6/24/2022   Total Score - 6 (mild anxiety)   Total Score 2 6     Type 2 Diabetes:  Currently taking Lantus 35 units twice a day since last visit, Novolog adjustment scale 1 unit per 25 over 150 mg/dL, takes after breakfast and after dinner. Steroid are increasing blood sugars.  Patient is not experiencing side effects.  Blood sugar monitoring:  had continous glucose monitor but alarms were going off all the time so he shut it off and went back to checking 2 time(s) daily. Ranges (patient reported):  This morning 260 mg/dL  The other morning 240 mg/dL  Last night 350, did not eat much last night  Got as low as 150 mg/dL one morning, he does not recall when  Eating Breakfast, protein drink, he is not sure the carbohydrate  Lunch sandwich with chips  Dinner soup  Hypoglycemia: reporting none  Hyperglycemia: he is fatigued, felt confused and not very good yesterday when his blood sugars were very high, he attributed this to drinking large glass of orange juice  Eye exam: done ~May  Foot exam: due  Diet/Exercise: twice  Aspirin: No  Statin: No   ACEi/ARB: No.   Urine Albumin:   Lab Results   Component Value Date    UCRR 23 02/24/2022    MICROL <5 02/24/2022    UMALCR  02/24/2022      Comment:      Unable to calculate:  Urine creatinine or albumin value below detectable level     Lab Results   Component Value Date    A1C 12.3 06/24/2022    A1C 10.1 01/19/2022    A1C 11.9 05/12/2020    A1C 6.3 08/10/2018    A1C 6.5 11/27/2017    A1C 6.4 06/29/2017    A1C 6.5 02/13/2017     Estimated Creatinine Clearance: 187.8 mL/min (A) (based on SCr of 0.58 mg/dL (L)).  GFR Estimate   Date Value Ref Range Status   09/13/2022 >90 >60 mL/min/1.73m2 Final     Comment:     Effective December 21, 2021 eGFRcr in adults is calculated using the 2021 CKD-EPI creatinine equation which includes age and gender (Minh amaya al., NEJ, DOI:  "10.1056/CQPDal2438328)   05/12/2020 >90 >60 mL/min/[1.73_m2] Final     Comment:     Non  GFR Calc  Starting 12/18/2018, serum creatinine based estimated GFR (eGFR) will be   calculated using the Chronic Kidney Disease Epidemiology Collaboration   (CKD-EPI) equation.       GFR, ESTIMATED POCT   Date Value Ref Range Status   09/01/2022 >60 >60 mL/min/1.73m2 Final     GFR Estimate If Black   Date Value Ref Range Status   05/12/2020 >90 >60 mL/min/[1.73_m2] Final     Comment:      GFR Calc  Starting 12/18/2018, serum creatinine based estimated GFR (eGFR) will be   calculated using the Chronic Kidney Disease Epidemiology Collaboration   (CKD-EPI) equation.       Wt Readings from Last 4 Encounters:   10/03/22 218 lb 9.6 oz (99.2 kg)   09/28/22 216 lb 4.8 oz (98.1 kg)   09/13/22 216 lb (98 kg)   09/12/22 218 lb (98.9 kg)     Estimated body mass index is 31.93 kg/m  as calculated from the following:    Height as of 9/28/22: 5' 9.02\" (1.753 m).    Weight as of 9/28/22: 216 lb 4.8 oz (98.1 kg).     Component      Latest Ref Rng & Units 9/29/2015   C-Peptide      0.9 - 6.9 ng/mL 6.4     Hypertension: Current medications include hydrochlorothiazide 25mg every day, lisinopril 20mg every day (increased last visit) takes at 6:30am.  Patient reports no current medication side effects.  BP Readings from Last 3 Encounters:   10/03/22 (!) 181/111   10/03/22 (!) 142/96   09/28/22 (!) 149/90     Potassium   Date Value Ref Range Status   09/28/2022 4.2 3.4 - 5.3 mmol/L Final   09/13/2022 4.2 3.4 - 5.3 mmol/L Final   05/12/2020 3.9 3.4 - 5.3 mmol/L Final       Hyperlipidemia: Current therapy includes rosuvastatin 5mg every day. Reports no side effects after starting.  The 10-year ASCVD risk score ([place] HOLBROOK Jr., et al., 2013) is: 6.7%    Values used to calculate the score:      Age: 42 years      Sex: Male      Is Non- : No      Diabetic: Yes      Tobacco smoker: No      Systolic Blood " Pressure: 133 mmHg      Is BP treated: No      HDL Cholesterol: 33 mg/dL      Total Cholesterol: 208 mg/dL          Recent Labs   Lab Test 02/24/22  1224 08/10/18  1110 11/11/16  1051 09/29/15  1604 07/14/14  1437   CHOL 208* 182   < > 184 168   HDL 33* 36*   < > 33* 34*   * 121*   < > 110 113   TRIG 338* 126   < > 204* 105   CHOLHDLRATIO  --   --   --  5.6* 5.0    < > = values in this interval not displayed.       Today's Vitals: There were no vitals taken for this visit.  ----------------  I spent 20 minutes with this patient today. All changes were made via collaborative practice agreement with JERRY Alicia Ra, CNP. A copy of the visit note was provided to the patient's provider(s).    The patient was sent via [facility] a summary of these recommendations.     Ester Carl, PharmD [facility]  Medication Therapy Management Practitioner   #[phone]    Telemedicine Visit Details  Type of service:  Telephone visit  Start Time: 2:44pm  End Time: 3:04 PM  Originating Location (patient location): Home  Distant Location (provider location):  [facility] [place]     Medication Therapy Recommendations  No medication therapy recommendations to display

## 2022-10-13 ENCOUNTER — TELEPHONE (OUTPATIENT)
Dept: PHARMACY | Facility: CLINIC | Age: 42
End: 2022-10-13

## 2022-10-13 NOTE — ORAL ONC MGMT
Oral Chemotherapy Monitoring Program     Placed call to patient in follow up of oral chemotherapy. I called to see when he received the new dosing of alecensa and when he started on that dose. Connor did not answer and his mailbox was full to unable to leave a voicemail. Patient has a provider appt next week so we'll review appt for additional details.     Raphael Dubose, PharmD, MS  Hematology/Oncology Clinical Pharmacist  [facility]

## 2022-10-13 NOTE — PROGRESS NOTES
"Medication Therapy Management (MTM) Encounter    ASSESSMENT:                            Medication Adherence/Access: {adherencechoices:321170}    History of metastatic lung cancer, multiple brain metastases: ***  Continues to work with oncology and neurosurgery.    Depression: ***, will need to continue citalopram for 4-6 weeks to determine efficacy    Type 2 Diabetes:  {DiabetesAssessGoals:546827}.  Per rule of 500 1 units of insulin covers 6 grams of carbohydrates and rule of 1800 1:20.    Per [employer] [place] online formulary, some preferred options: Bydureon, Victoza, Jardiance, Tradjenta, pioglitazone, glipizide Recommend against metformin due to history of needing imaging for cancer.    Hypertension: {stable?:772280}    Hyperlipidemia: Pt will be due for fasting labs in 2-3 months.     Vaccines: Per due for COVID-19, Prevnar-20, Tdap, Hepatitis B vaccines.  He reports he can get these scheduled  Per CDC \"Vaccines might be less effective during the period of altered immunocompetence. Non-live vaccines might best be deferred during a period of altered immunocompetence; in this circumstance, the concern is with effectiveness and not safety. Additionally, if a non-live vaccine is administered during the period of altered immunocompetence, it might need to be repeated after immune function has improved.\"    PLAN:                            ***  Lantus 45 units twice daily  Novolog 8 units at meals and   An insulin correction scale use 1 unit rapid-acting insulin to correct for every 20 mg/dL that blood glucose is above 150 mg/dL.    <50 mg/dL:           take no insulin injections  50-69 mg/dL:        minus 4 units   70-99 mg/dL:        minus 2 units   100-150 mg/dL:    no adjustment  150-169 mg/dL:    add 1 unit  170-189 mg/dL:    add 2 units  190-209 mg/dL:    add 3 units  210-229 mg/dL:    add 4 units   230-249 mg/dL:    add 5 units    250-269 mg/dL:    add 6 units  270-289 mg/dL:    add 7 units  290-309 mg/dL:    " add 8 units  310-329 mg/dL:    add 9 units  330-349 mg/dL:    add 10 units  350-369 mg/dL:    add 11 units  370-389 mg/dL:    add 12 units  390-409 mg/dL:    add 13 units  >410 mg/dL:         add 14 units and call your doctor    Follow-up: {followuptest2:443364}    SUBJECTIVE/OBJECTIVE:                          Connor Emerson is a 42 year old male called for a follow-up visit.  Today's visit is a follow-up MT visit from 10/11/22.     Reason for visit: ***.    Allergies/ADRs: {1/2/3/4/5:787414}  Past Medical History: {1/2/3/4/5:823473}  Tobacco: He reports that he has never smoked. He has never used smokeless tobacco.  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  His wife sets them up for him.  Per patient, misses medication 0 times per week.   Medication barriers: none.   The patient fills medications at [place]: No, he goes to [facility].     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    Certified for Medical Marijuana 9/13/22  Following up with Neurosurgery Dr. Moran and follows with Oncology Dr. Persaud      Hemoglobin   Date Value Ref Range Status   09/13/2022 14.3 13.3 - 17.7 g/dL Final   04/27/2017 15.2 13.3 - 17.7 g/dL Final   ]    Depression:  Current medications include:  Citalopram 20mg once daily. Patient reports he was able to start this medications last week. Side effects: nausea but improved  SADIA-7 SCORE 5/12/2020 6/24/2022   Total Score - 6 (mild anxiety)   Total Score 2 6     Type 2 Diabetes:  Currently taking Lantus 38 units twice a day since last visit, Novolog 6 units at lunch and dinner and adjustment scale 1 unit  per 25 over 150 mg/dL, takes after breakfast and after dinner. Steroid are increasing blood sugars.  Patient is not experiencing side effects.  Blood sugar monitoring:  had continous glucose monitor    Eating Breakfast, protein drink, he is not sure the carbohydrate  Lunch sandwich with chips  Dinner soup  Hypoglycemia: reporting none  Hyperglycemia: he is fatigued, felt confused and not very good yesterday when his blood sugars were very high, he attributed this to drinking large glass of orange juice  Eye exam: done ~May  Foot exam: due  Diet/Exercise: twice  Aspirin: No  Statin: No   ACEi/ARB: No.   Urine Albumin:   Lab Results   Component Value Date    UCRR 23 02/24/2022    MICROL <5 02/24/2022    UMALCR  02/24/2022      Comment:      Unable to calculate:  Urine creatinine or albumin value below detectable level     Lab Results   Component Value Date    A1C 12.3 06/24/2022    A1C 10.1 01/19/2022    A1C 11.9 05/12/2020    A1C 6.3 08/10/2018    A1C 6.5 11/27/2017    A1C 6.4 06/29/2017    A1C 6.5 02/13/2017     Estimated Creatinine Clearance: 187.8 mL/min (A) (based on SCr of 0.58 mg/dL (L)).  GFR Estimate   Date Value Ref Range Status   09/13/2022 >90 >60 mL/min/1.73m2 Final     Comment:     Effective December 21, 2021 eGFRcr in adults is calculated using the 2021 CKD-EPI creatinine equation which includes age and gender (Minh amaya al., NEJ, DOI: 10.1056/HEWGcf6507029)   05/12/2020 >90 >60 mL/min/[1.73_m2] Final     Comment:     Non  GFR Calc  Starting 12/18/2018, serum creatinine based estimated GFR (eGFR) will be   calculated using the Chronic Kidney Disease Epidemiology Collaboration   (CKD-EPI) equation.       GFR, ESTIMATED POCT   Date Value Ref Range Status   09/01/2022 >60 >60 mL/min/1.73m2 Final     GFR Estimate If Black   Date Value Ref Range Status   05/12/2020 >90 >60 mL/min/[1.73_m2] Final     Comment:      GFR Calc  Starting 12/18/2018, serum creatinine based estimated  "GFR (eGFR) will be   calculated using the Chronic Kidney Disease Epidemiology Collaboration   (CKD-EPI) equation.       Wt Readings from Last 4 Encounters:   10/03/22 218 lb 9.6 oz (99.2 kg)   09/28/22 216 lb 4.8 oz (98.1 kg)   09/13/22 216 lb (98 kg)   09/12/22 218 lb (98.9 kg)     Estimated body mass index is 31.93 kg/m  as calculated from the following:    Height as of 9/28/22: 5' 9.02\" (1.753 m).    Weight as of 9/28/22: 216 lb 4.8 oz (98.1 kg).     Component      Latest Ref Rng & Units 9/29/2015   C-Peptide      0.9 - 6.9 ng/mL 6.4     Hypertension: Current medications include hydrochlorothiazide 25mg every day, lisinopril 20mg every day (increased last visit) takes at 6:30am.  Patient reports no current medication side effects.  BP Readings from Last 3 Encounters:   10/03/22 (!) 181/111   10/03/22 (!) 142/96   09/28/22 (!) 149/90     Potassium   Date Value Ref Range Status   09/28/2022 4.2 3.4 - 5.3 mmol/L Final   09/13/2022 4.2 3.4 - 5.3 mmol/L Final   05/12/2020 3.9 3.4 - 5.3 mmol/L Final       Hyperlipidemia: Current therapy includes rosuvastatin 5mg every day. Reports no side effects after starting.  The 10-year ASCVD risk score ([place] HOLBROOK Jr., et al., 2013) is: 6.7%    Values used to calculate the score:      Age: 42 years      Sex: Male      Is Non- : No      Diabetic: Yes      Tobacco smoker: No      Systolic Blood Pressure: 133 mmHg      Is BP treated: No      HDL Cholesterol: 33 mg/dL      Total Cholesterol: 208 mg/dL          Recent Labs   Lab Test 02/24/22  1224 08/10/18  1110 11/11/16  1051 09/29/15  1604 07/14/14  1437   CHOL 208* 182   < > 184 168   HDL 33* 36*   < > 33* 34*   * 121*   < > 110 113   TRIG 338* 126   < > 204* 105   CHOLHDLRATIO  --   --   --  5.6* 5.0    < > = values in this interval not displayed.     Today's Vitals: There were no vitals taken for this visit.  ----------------  {RENETTA?:768032}    I spent {[facility] total time 3:415890} with this patient " "today{MTMpartdbillingquestion:203964}. { :431537}. A copy of the visit note was provided to the patient's provider(s).    The patient {GIVEN/NOT GIVEN:301197::\"was given\"} a summary of these recommendations. {covisit:594322}    ***    Telemedicine Visit Details  Type of service:  {telemedvisitmtm:490996::\"Telephone visit\"}  Start Time: {video/phone visit start time:152948}  End Time: {video/phone visit end time:152948}  Originating Location (patient location): [place]  Distant Location (provider location):  [facility]     Medication Therapy Recommendations  No medication therapy recommendations to display       "

## 2022-10-17 ENCOUNTER — VIRTUAL VISIT (OUTPATIENT)
Dept: PHARMACY | Facility: CLINIC | Age: 42
End: 2022-10-17
Payer: COMMERCIAL

## 2022-10-17 DIAGNOSIS — C34.31 MALIGNANT NEOPLASM OF LOWER LOBE OF RIGHT LUNG (H): ICD-10-CM

## 2022-10-17 DIAGNOSIS — E11.65 TYPE 2 DIABETES MELLITUS WITH HYPERGLYCEMIA, WITH LONG-TERM CURRENT USE OF INSULIN (H): Primary | ICD-10-CM

## 2022-10-17 DIAGNOSIS — C79.31 BRAIN METASTASIS: ICD-10-CM

## 2022-10-17 DIAGNOSIS — Z79.4 TYPE 2 DIABETES MELLITUS WITH HYPERGLYCEMIA, WITH LONG-TERM CURRENT USE OF INSULIN (H): Primary | ICD-10-CM

## 2022-10-17 PROCEDURE — 99607 MTMS BY PHARM ADDL 15 MIN: CPT | Performed by: PHARMACIST

## 2022-10-17 PROCEDURE — 99606 MTMS BY PHARM EST 15 MIN: CPT | Performed by: PHARMACIST

## 2022-10-17 NOTE — PROGRESS NOTES
Medication Therapy Management (MTM) Encounter    ASSESSMENT:                            Medication Adherence/Access: See below for considerations    History of metastatic lung cancer, multiple brain metastases: continues to work with oncology and neurosurgery.  Restarted alectinib this past weekend and will be getting bevacizumab this week which may had to hyper glycemia, weaning off dexamethasone.  Per Anna-comp alectinib: hyperglycemia (22% to 36%),  Bevacizumab-Hyperglycemia (26%),     Type 2 Diabetes:  Patient is not meeting goal of >70% time in target with continuous glucose monitoring. Per rule of 500 1 units of insulin covers 5 grams of carbohydrates and rule of 1800 1:18.  Will increase insulin and after contrast dye for MR of brain, will restart metformin to help with insulin resistance.    Per [employer] [place] online formulary, some preferred options: Bydureon, Victoza, Jardiance, Tradjenta, pioglitazone, glipizide     PLAN:                            1.  Increase to Lantus 45 units twice daily  2.  Increase Novolog 8 units at meals when eating and   An insulin correction scale use 1 unit rapid-acting insulin to correct for every 20 mg/dL that blood glucose is above 150 mg/dL.    <50 mg/dL:           take no insulin injections  50-69 mg/dL:        minus 4 units   70-99 mg/dL:        minus 2 units   100-150 mg/dL:    no adjustment  150-169 mg/dL:    add 1 unit  170-189 mg/dL:    add 2 units  190-209 mg/dL:    add 3 units  210-229 mg/dL:    add 4 units   230-249 mg/dL:    add 5 units    250-269 mg/dL:    add 6 units  270-289 mg/dL:    add 7 units  290-309 mg/dL:    add 8 units  310-329 mg/dL:    add 9 units  330-349 mg/dL:    add 10 units  350-369 mg/dL:    add 11 units  370-389 mg/dL:    add 12 units  390-409 mg/dL:    add 13 units  >410 mg/dL:         add 14 units and call your doctor    Next visit: review blood pressure plan    Follow-up: Return in 10 days (on 10/27/2022) for MTM Pharmacist Visit, phone  visit.    SUBJECTIVE/OBJECTIVE:                          Connor Emerson is a 42 year old male called for a follow-up visit.  Today's visit is a follow-up MTM visit from 10/11/22.     Reason for visit: diabetes.    Allergies/ADRs: Reviewed in chart  Past Medical History: Reviewed in chart  Tobacco: He reports that he has never smoked. He has never used smokeless tobacco.  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  His wife sets them up for him.  Per patient, misses medication 0 times per week.   Medication barriers: none.   The patient fills medications at [facility]: No, he goes to [facility].     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  Side effects: high blood sugars, lost appetite, not feeling good, blood feels thick, fatigued  Certified for Medical Marijuana 9/13/22  Following up with Neurosurgery Dr. Moran and follows with Oncology Dr. Persaud      Hemoglobin   Date Value Ref Range Status   09/13/2022 14.3 13.3 - 17.7 g/dL Final   04/27/2017 15.2 13.3 - 17.7 g/dL Final   ]    Type 2 Diabetes:  Currently taking Lantus 38 units twice a day since last visit, Novolog 6 units at lunch and dinner and adjustment scale 1 unit per 25 over 150 mg/dL, takes after lunch and after dinner.  Patient is not experiencing side effects.  Overall not feeling well with restarting chemotherapy  Blood sugars from continous glucose monitor is different than his glucometer (runs lower)  Blood sugar monitoring:  had continous glucose monitor  SMGB in 300s and high 200s    Not eating Breakfast  Lunch sandwich with chips yesterday  Dinner last night was  "fidelia  Hypoglycemia: reporting none  Hyperglycemia: he is fatigued, his \"blood feels thick\"  Eye exam: done ~May  Foot exam: due  Diet/Exercise: twice  Aspirin: No  Statin: No   ACEi/ARB: No.   Urine Albumin:   Lab Results   Component Value Date    UCRR 23 02/24/2022    MICROL <5 02/24/2022    UMALCR  02/24/2022      Comment:      Unable to calculate:  Urine creatinine or albumin value below detectable level     Lab Results   Component Value Date    A1C 12.3 06/24/2022    A1C 10.1 01/19/2022    A1C 11.9 05/12/2020    A1C 6.3 08/10/2018    A1C 6.5 11/27/2017    A1C 6.4 06/29/2017    A1C 6.5 02/13/2017     Estimated Creatinine Clearance: 187.8 mL/min (A) (based on SCr of 0.58 mg/dL (L)).  GFR Estimate   Date Value Ref Range Status   09/13/2022 >90 >60 mL/min/1.73m2 Final     Comment:     Effective December 21, 2021 eGFRcr in adults is calculated using the 2021 CKD-EPI creatinine equation which includes age and gender (Minh et al., NEJM, DOI: 10.1056/XFJJwt9502368)   05/12/2020 >90 >60 mL/min/[1.73_m2] Final     Comment:     Non  GFR Calc  Starting 12/18/2018, serum creatinine based estimated GFR (eGFR) will be   calculated using the Chronic Kidney Disease Epidemiology Collaboration   (CKD-EPI) equation.       GFR, ESTIMATED POCT   Date Value Ref Range Status   09/01/2022 >60 >60 mL/min/1.73m2 Final     GFR Estimate If Black   Date Value Ref Range Status   05/12/2020 >90 >60 mL/min/[1.73_m2] Final     Comment:      GFR Calc  Starting 12/18/2018, serum creatinine based estimated GFR (eGFR) will be   calculated using the Chronic Kidney Disease Epidemiology Collaboration   (CKD-EPI) equation.       Wt Readings from Last 4 Encounters:   10/03/22 218 lb 9.6 oz (99.2 kg)   09/28/22 216 lb 4.8 oz (98.1 kg)   09/13/22 216 lb (98 kg)   09/12/22 218 lb (98.9 kg)     Estimated body mass index is 31.93 kg/m  as calculated from the following:    Height as of 9/28/22: 5' 9.02\" (1.753 m).    " Weight as of 9/28/22: 216 lb 4.8 oz (98.1 kg).     Component      Latest Ref Rng & Units 9/29/2015   C-Peptide      0.9 - 6.9 ng/mL 6.4         Today's Vitals: There were no vitals taken for this visit.  ----------------      I spent 13 minutes with this patient today. All changes were made via collaborative practice agreement with JERRY Alicia Ra, CNP. A copy of the visit note was provided to the patient's provider(s).    The patient was sent via [employer] a summary of these recommendations.     Ester Carl, PharmD [facility]  Medication Therapy Management Practitioner   #[phone]    Telemedicine Visit Details  Type of service:  Telephone visit  Start Time: 10:04am  End Time: 10:17 AM  Originating Location (patient location): [place]  Distant Location (provider location):  [facility]     Medication Therapy Recommendations  Type 2 diabetes mellitus with hyperglycemia, with long-term current use of insulin (H)    Current Medication: insulin glargine (LANTUS PEN) 100 UNIT/ML pen   Rationale: Dose too low - Dosage too low - Effectiveness   Recommendation: Increase Dose - Increase Novolog and Lantus   Status: Accepted per CPA

## 2022-10-17 NOTE — PATIENT INSTRUCTIONS
Recommendations from today's MTM visit:                                                      1.  Increase to Lantus 45 units twice daily  2.  Increase Novolog 8 units at meals when eating and   An insulin correction scale use 1 unit rapid-acting insulin to correct for every 20 mg/dL that blood glucose is above 150 mg/dL.    <50 mg/dL:           take no insulin injections  50-69 mg/dL:        minus 4 units   70-99 mg/dL:        minus 2 units   100-150 mg/dL:    no adjustment  150-169 mg/dL:    add 1 unit  170-189 mg/dL:    add 2 units  190-209 mg/dL:    add 3 units  210-229 mg/dL:    add 4 units   230-249 mg/dL:    add 5 units    250-269 mg/dL:    add 6 units  270-289 mg/dL:    add 7 units  290-309 mg/dL:    add 8 units  310-329 mg/dL:    add 9 units  330-349 mg/dL:    add 10 units  350-369 mg/dL:    add 11 units  370-389 mg/dL:    add 12 units  390-409 mg/dL:    add 13 units  >410 mg/dL:         add 14 units and call your doctor    Next visit: review blood pressure plan    Follow-up: Return in 10 days (on 10/27/2022) for MTM Pharmacist Visit, phone visit.    To schedule another MTM appointment, please call the clinic directly or you may call the MTM scheduling line at [phone] or toll-free at [phone].     My Clinical Pharmacist's contact information:                                                      It was a pleasure seeing you today!  Please feel free to contact me with any questions or concerns you have.      Ester Carl, PharmD [facility]  Medication Therapy Management Practitioner   #[phone]    It was great to speak with you today.  I value your experience and would be very thankful for your time with providing feedback on our clinic survey. You may receive a survey via email or text message in the next few days.

## 2022-10-18 ENCOUNTER — ONCOLOGY VISIT (OUTPATIENT)
Dept: ONCOLOGY | Facility: CLINIC | Age: 42
End: 2022-10-18
Attending: NURSE PRACTITIONER
Payer: COMMERCIAL

## 2022-10-18 ENCOUNTER — APPOINTMENT (OUTPATIENT)
Dept: LAB | Facility: CLINIC | Age: 42
End: 2022-10-18
Attending: NURSE PRACTITIONER
Payer: COMMERCIAL

## 2022-10-18 VITALS
RESPIRATION RATE: 16 BRPM | BODY MASS INDEX: 32.24 KG/M2 | HEART RATE: 53 BPM | DIASTOLIC BLOOD PRESSURE: 102 MMHG | OXYGEN SATURATION: 97 % | WEIGHT: 218.4 LBS | SYSTOLIC BLOOD PRESSURE: 162 MMHG | TEMPERATURE: 97.9 F

## 2022-10-18 DIAGNOSIS — C34.31 MALIGNANT NEOPLASM OF LOWER LOBE OF RIGHT LUNG (H): ICD-10-CM

## 2022-10-18 DIAGNOSIS — I10 HTN, GOAL BELOW 140/90: Primary | ICD-10-CM

## 2022-10-18 LAB
ALBUMIN SERPL BCG-MCNC: 3.9 G/DL (ref 3.5–5.2)
ALP SERPL-CCNC: 76 U/L (ref 40–129)
ALT SERPL W P-5'-P-CCNC: 19 U/L (ref 10–50)
ANION GAP SERPL CALCULATED.3IONS-SCNC: 15 MMOL/L (ref 7–15)
AST SERPL W P-5'-P-CCNC: 16 U/L (ref 10–50)
BASOPHILS # BLD AUTO: 0.1 10E3/UL (ref 0–0.2)
BASOPHILS NFR BLD AUTO: 0 %
BILIRUB SERPL-MCNC: 0.6 MG/DL
BUN SERPL-MCNC: 22.1 MG/DL (ref 6–20)
CALCIUM SERPL-MCNC: 9 MG/DL (ref 8.6–10)
CHLORIDE SERPL-SCNC: 102 MMOL/L (ref 98–107)
CREAT SERPL-MCNC: 0.51 MG/DL (ref 0.67–1.17)
DEPRECATED HCO3 PLAS-SCNC: 27 MMOL/L (ref 22–29)
EOSINOPHIL # BLD AUTO: 0.2 10E3/UL (ref 0–0.7)
EOSINOPHIL NFR BLD AUTO: 1 %
ERYTHROCYTE [DISTWIDTH] IN BLOOD BY AUTOMATED COUNT: 13.6 % (ref 10–15)
GFR SERPL CREATININE-BSD FRML MDRD: >90 ML/MIN/1.73M2
GLUCOSE SERPL-MCNC: 151 MG/DL (ref 70–99)
HCT VFR BLD AUTO: 41 % (ref 40–53)
HGB BLD-MCNC: 14.4 G/DL (ref 13.3–17.7)
IMM GRANULOCYTES # BLD: 0.2 10E3/UL
IMM GRANULOCYTES NFR BLD: 2 %
LYMPHOCYTES # BLD AUTO: 5.2 10E3/UL (ref 0.8–5.3)
LYMPHOCYTES NFR BLD AUTO: 38 %
MCH RBC QN AUTO: 33 PG (ref 26.5–33)
MCHC RBC AUTO-ENTMCNC: 35.1 G/DL (ref 31.5–36.5)
MCV RBC AUTO: 94 FL (ref 78–100)
MONOCYTES # BLD AUTO: 0.6 10E3/UL (ref 0–1.3)
MONOCYTES NFR BLD AUTO: 5 %
NEUTROPHILS # BLD AUTO: 7.4 10E3/UL (ref 1.6–8.3)
NEUTROPHILS NFR BLD AUTO: 54 %
NRBC # BLD AUTO: 0.1 10E3/UL
NRBC BLD AUTO-RTO: 1 /100
PLAT MORPH BLD: NORMAL
PLATELET # BLD AUTO: 213 10E3/UL (ref 150–450)
POTASSIUM SERPL-SCNC: 3.2 MMOL/L (ref 3.4–5.3)
PROT SERPL-MCNC: 5.9 G/DL (ref 6.4–8.3)
RBC # BLD AUTO: 4.37 10E6/UL (ref 4.4–5.9)
RBC MORPH BLD: NORMAL
SODIUM SERPL-SCNC: 144 MMOL/L (ref 136–145)
WBC # BLD AUTO: 13.7 10E3/UL (ref 4–11)

## 2022-10-18 PROCEDURE — 36415 COLL VENOUS BLD VENIPUNCTURE: CPT | Performed by: NURSE PRACTITIONER

## 2022-10-18 PROCEDURE — 80053 COMPREHEN METABOLIC PANEL: CPT | Performed by: NURSE PRACTITIONER

## 2022-10-18 PROCEDURE — 99215 OFFICE O/P EST HI 40 MIN: CPT | Performed by: NURSE PRACTITIONER

## 2022-10-18 PROCEDURE — G0463 HOSPITAL OUTPT CLINIC VISIT: HCPCS

## 2022-10-18 PROCEDURE — 36592 COLLECT BLOOD FROM PICC: CPT

## 2022-10-18 PROCEDURE — 85025 COMPLETE CBC W/AUTO DIFF WBC: CPT | Performed by: NURSE PRACTITIONER

## 2022-10-18 ASSESSMENT — PAIN SCALES - GENERAL: PAINLEVEL: NO PAIN (0)

## 2022-10-18 NOTE — NURSING NOTE
PIV removal done on pt per LG orders in left upper forearm. Saline locked per chart verified. Pt tolerated well. See flowsheets for more detail.     Amber Wilkins, RADHA on 10/18/2022 at 9:38 AM

## 2022-10-18 NOTE — NURSING NOTE
"Oncology Rooming Note    October 18, 2022 8:58 AM   Connor Emerson is a 42 year old male who presents for:    Chief Complaint   Patient presents with     Blood Draw     Labs drawn with piv start by rn.  VS taken.     Oncology Clinic Visit     HTN, goal below 140/90; Malignant neoplasm of lower lobe of right lung (H)      Initial Vitals: BP (!) 162/102   Pulse 53   Temp 97.9  F (36.6  C) (Oral)   Resp 16   Wt 99.1 kg (218 lb 6.4 oz)   SpO2 97%   BMI 32.24 kg/m   Estimated body mass index is 32.24 kg/m  as calculated from the following:    Height as of 9/28/22: 1.753 m (5' 9.02\").    Weight as of this encounter: 99.1 kg (218 lb 6.4 oz). Body surface area is 2.2 meters squared.  No Pain (0) Comment: Data Unavailable   No LMP for male patient.  Allergies reviewed: Yes  Medications reviewed: Yes    Medications: Medication refills not needed today.  Pharmacy name entered into EPIC:    [facility] [place] [place] [facility] [address]  [facility] [place] [address] [place] [address]  [facility] [place] [facility] [place][note 3] [id number] - [place], [place] - [address] AT [place] [address] & [id number] - [place], [place] - [address] AT [facility] & [place] [address]    Clinical concerns:    Pt would like to switch his pharmacy from [facility] to [facility].    Haja Irizarry            "

## 2022-10-18 NOTE — PROGRESS NOTES
MEDICAL ONCOLOGY FOLLOW UP NOTE    PATIENT NAME: Connor Emerson  ENCOUNTER DATE: 10/18/2022    Care Team  Primary Oncologist: Bassam Persaud MD    REASON FOR CURRENT VISIT: F/u of lung cancer    HISTORY OF PRESENT ILLNESS:  Mr. Connor Emerson is a 42 year old  male who is a non-smoker with PMHx of T2DM, HTN with metastatic NSCLC comes for follow up     Oncologic Hx:    Diagnosis:   Stage IV NSCLC, Rt lung adenocarcinoma with metastasis to pleura, mediastinum , rt pleural effusion and brain diagnosed 1/2022 (AJCC 8th edition)  PD-L1 TPS 2-3% by [place]   NGS [facility] panel-EML4:ALK rearragement  NGS Guardant- GNAS R201H, KRAS K5E- No ALK    Treatment:   Current:  3/2/22- now- Alectinib 300 mg BID (Dose reduced to 450 mg BID from 600 mg BID due to grade 3 myalgias 3/21/22, again reduced to 300 mg BID 9/28/22)  9/28/22- Bevacizumab for radiation necrosis  )  Past:  2/15/22- GK to 11 briain lesions    Intent of treatment: Palliative    Oncologic course:  1/19/22 to 1/22/22-Admitted to [facility] for 2 week progressive SOB secondary to have large rt sided pleural effusion, needing thoracentesis x2 (1.7L and 2.0 L removed), cytology positive for malignancy, adenocarcinoma.   1/26/22- Rt pleural mass biopsy-Dr. Agrawal--POSITIVE FOR ADENOCARCINOMA CONSISTENT WITH LUNG PRIMARY, admixed with mesothelial hyperplasia and inflammatory infiltrate (+ TTF-1 and CK 7;  negative  p40, calretinin and WT-1. PAX8 immunostain focal +). 4th thoracentesis done simultaneously - 3L approx removed.   2/1/22- PET/CT-Right lower lobe central infiltrative FDG avid 8.2 x 9.6 cm mass representing a primary lung adenocarcinoma. Ipsilateral right perihilar, bilateral pretracheal, subcarinal and superior mediastinal michele metastases. Contralateral mildly FDG avid few lung nodules are suspicious for contralateral metastasis. At least 3 intracranial metastases in the right frontal lobe, left frontal lobe and left cerebellar hemisphere. Nonspecific mild diffuse  bone marrow uptake. Further evaluation with a spine MRI could be considered to rule out early marrow infiltration. This could also be seen with red marrow conversion.  2/5/22-  Brain MRI- At least 9 intracranial metastases as detailed above. The dominant lesions involving the orbital right frontal lobe, the posterior left middle frontal gyrus, anterior right temporal lobe and in the left cerebellar hemisphere have surrounding moderate vasogenic type edema.  2/15/22- Saw Dr. Arango from Rad Onc- Rcd GK to 12 lesion in bran  2/16/22- Pleurex placement   3/2/22- Started Alectinib 600 mg BID  3/21/22- Dose reduced to 450 mg BID due to grade 3 myalgias and fatigue  4/2/22 to 4/5/22- Admitted at [facility] for- Severe sepsis due to MSSA infection of right PleurX catheter s/p removal- He presented with onset of pain at tube site starting 4/1; at arrival was tachycardic with leukocytosis (22.7) and elevated lactic acid (2.9).  CT chest showed fluid and stranding tracking outside the pleural space into chest wall along pleural catheter.  IR was consulted and removed catheter 4/2 with report of pustular drainage and tip culture growing MSSA.  Thoracic Surg was consulted who felt no surgical indication necessary given minimal pleural fluid and lack of any signs of abscess.  Initially treated with broad spectrum coverage for sepsis, narrowed to Ancef once sensitivities returned with plan to transition to cefadroxil for an additional 10 days at discharge per ID. Held drug 4/2 to 4/11 5/2/22- CT CAP- Overall, positive response to therapy with decreased size of right lower lobe and right pleural-based masses, pulmonary metastases, hilar and mediastinal lymphadenopathy. However, a single right posterior pleural-based mass has slightly increased in size since 2/24/2022. No metastatic disease in the abdomen and pelvis. Right Pleurx catheter has been removed. Trace right pleural effusion and right basilar atelectasis.  5/2/22-  Brain MRI- The previously demonstrated brain metastases are mildly diminished in size versus to 2/5/2022. The degree of edema is also diminished but not completely resolved. Probable trace amounts of intralesional bleeding demonstrated on the gradient sequence within the metastases. No definite new metastasis or progressive mass effect. No hydrocephalus or infarct.    6/15/22 to 6/17/22- Admitted at [facility]-with aphasia and word finding difficulty over last few weeks.  He presented to [facility] ED on 6/10 for evaluation of his symptoms. MRI brain showed multiple intracranial metastases, with interval enlargement of the dominant lesion within the left frontal lobe and increased surrounding vasogenic edema with 2 mm rightward shift of the septum pellucidum. Due to his worsening anxiety, he left AMA. His symptoms continued to progress to where he could not write at work so he decided to go to the ED for re-evaluation and treatment. Evaluated by JATINDER, Rad Onc (radiaiton necrosis vs tumor progression).  6/16/22- MR Brain (6/16) shows multiple intracranial metastases, with interval enlargement  of the dominant lesion within the left frontal lobe and increased surrounding vasogenic edema with 2 mm rightward shift of the septum pellucidum.  6/16/22- - CT CAP shows slightly decreased size of right lower lobe and right pleural-based masses. No new pulmonary nodules or lymphadenopathy; No evidence of metastatic disease in the abdomen or pelvis.   6/28/22 to 6/30/22- Admitted at [facility]- Elective left Stealth craniotomy with resection of brain tumor due to ongoing symptoms. No intraoperative complications. EBL 50 ml.  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  7/5/22- Ct CAP- Right lower lobe low-density nodules are not significantly changed. A small left upper lobe pulmonary nodule is also unchanged. Trace pleural fluid on the right has increased slightly. No convincing evidence for metastatic disease in the abdomen or  pelvis.  7/18/22 to 7/19/22- Admitted to [facility] for seizure- Reportedly was only taking once Keppra instead of twice daily. Also resumed on dexamethasone 2 mg daily  8/1/22- Brain MRI- Redemonstrated postsurgical changes status post left frontoparietal Craniotomy. Interval increase in size of the dominant ring-enhancing lesion in the left posterior superior frontal lobe with increased moderate surrounding vasogenic edema and local mass effect resulting in narrowing of the supratentorial ventricular system. No significant midline shift/herniation at this time  8/1/22- Dex was increased to 4 mg daily by Dr. Moran  9/1/22- CT Chest- Near resolution of previously seen right pleural nodule. Stable right lower lobe pulmonary nodule    He works as a maintenance manger for apartment complexes    Interval Hx:  Connor is here prior to second dose of Bevacizumab for Radiation necrosis. Feels like the first infusion went well, tired the next day but otherwise no acute SE.     About one week ago he restarted his alectinib at 300 mg BID, he had been off of it for some time prior as he ran out and was not sure how to get more.  Despite holding, he has ongoing diffuse body aches. This is the same pain he has had for months that is unchanged. Hips, legs, torso.     His lantus was recently increased. This morning his BS was 150 which is the lowest it has been for a while. He is doing his dex taper as outlined, down to 1 mg daily.     He was started on lisiopril and hydrochlorothiazide for HTN. Not checking BP at home.     Breathing is stable, no cough, no fever or chills.    He continues to work daily,  independent of ADL and IADL    REVIEW OF SYSTEMS: 14 point ROS negative other than the symptoms noted above in the HPI.    Wt Readings from Last 4 Encounters:   10/18/22 99.1 kg (218 lb 6.4 oz)   10/03/22 99.2 kg (218 lb 9.6 oz)   09/28/22 98.1 kg (216 lb 4.8 oz)   09/13/22 98 kg (216 lb)      Review of Systems:  A comprehensive ROS was  performed and found to be negative or non-contributory with the exception of that noted in the HPI above.    Past Medical History:  GERD  Hypertension, not on medication  Type 2 diabetes mellitus, not on medications currently, previously on Metformin    Past Surgical History:  Past Surgical History:   Procedure Laterality Date     BRONCHOSCOPY RIGID OR FLEXIBLE W/TRANSENDOSCOPIC ENDOBRONCHIAL ULTRASOUND GUIDED Bilateral 1/26/2022    Procedure: Right BRONCHOSCOPY, FIBEROPTIC, endobronchial ultrasound, pleural biopsy;  Surgeon: Dallin Agrawal MD;  Location: [facility] OR     INJECT BLOCK MEDIAL BRANCH CERVICAL/THORACIC/LUMBAR       INSERT CHEST TUBE Right 2/16/2022    Procedure: INSERTION, CATHETER, INTERCOSTAL, FOR DRAINAGE;  Surgeon: Dallin Agrawal MD;  Location: [facility] GI     INSERT CHEST TUBE Right 3/9/2022    Procedure: INSERTION, CATHETER, INTERCOSTAL, FOR DRAINAGE;  Surgeon: Sushila Antonio MD;  Location: [facility] GI     IR CHEST TUBE REMOVAL TUNNELED RIGHT  4/2/2022     OPTICAL TRACKING SYSTEM CRANIOTOMY, EXCISE TUMOR, COMBINED Left 6/28/2022    Procedure: Left stealth craniotomy for tumor resection with motor mapping;  Surgeon: Stephen Moran MD;  Location:  OR     ORTHOPEDIC SURGERY      Ganesh. Rotator cuff repair.     PLEUROSCOPY N/A 1/26/2022    Procedure: Pleuroscopy with Pleural Biopsy;  Surgeon: Dallin Agrawal MD;  Location:  OR       Social History:  Lives with wife and 4 kids in [place]. Works as a  for an apartment complex in [place]. Exposure to household chemicals and . No significant exposure to asbestos. No signal exposure to benzene or similar chemicals. No significant smoking history-states that he smoked 1 to 2 cigarettes occasionally per month for about 2 years in college, non-smoking since then. No significant alcohol use history. No other recreational substances. Good support system. Kids are 23, 19, 17 and 13.    Family History  Significant history for cancers  on maternal side. Mother  of uterine cancer. 2 maternal uncles have possible metastatic melanoma.    Outpatient Medications:  Current Outpatient Medications   Medication     alcohol swab prep pads     alectinib (ALECENSA) 150 MG CAPS     blood glucose (NO BRAND SPECIFIED) test strip     blood glucose calibration (NO BRAND SPECIFIED) solution     blood glucose monitoring (NO BRAND SPECIFIED) meter device kit     Continuous Blood Gluc  (FREESTYLE IRENE 2 READER) YVES     Continuous Blood Gluc Sensor (FREESTYLE IRENE 2 SENSOR) MISC     dexamethasone (DECADRON) 4 MG tablet     hydrochlorothiazide (HYDRODIURIL) 25 MG tablet     insulin aspart (NOVOLOG PEN) 100 UNIT/ML pen     insulin glargine (LANTUS PEN) 100 UNIT/ML pen     insulin pen needle (31G X 8 MM) 31G X 8 MM miscellaneous     levETIRAcetam (KEPPRA) 1000 MG tablet     lisinopril (ZESTRIL) 20 MG tablet     rosuvastatin (CRESTOR) 5 MG tablet     thin (NO BRAND SPECIFIED) lancets     citalopram (CELEXA) 20 MG tablet     methocarbamol (ROBAXIN) 500 MG tablet     oxyCODONE (ROXICODONE) 5 MG tablet     prochlorperazine (COMPAZINE) 10 MG tablet     No current facility-administered medications for this visit.       Physical Exam:    Blood pressure (!) 162/102, pulse 53, temperature 97.9  F (36.6  C), temperature source Oral, resp. rate 16, weight 99.1 kg (218 lb 6.4 oz), SpO2 97 %.   General: alert and cooperative, sitting up in chair  HEENT: sclera anicteric, EOMI, MMM. Pupils equal and reactive.   Resp: breathing comfortably   GI: soft, non-tender, non-distended, bowel sounds present and normoactive  MSK: warm and well-perfused, normal tone  Skin: no rashes on limited exam, no jaundice  Neuro: Alert awake and oriented x4, strength is 5 on 5 throughout, sensations are grossly intact    Labs & Studies: I personally reviewed the following studies:  Most Recent 3 CBC's:  Recent Labs   Lab Test 10/18/22  0833 22  0852 22  1642   WBC 13.7* 11.1* 13.8*    HGB 14.4 14.3 14.4   MCV 94 97 94    248 252     Most Recent 3 BMP's:  Recent Labs   Lab Test 10/18/22  0833 09/28/22  1116 09/13/22  0852 09/12/22  1642     --  140 140   POTASSIUM 3.2* 4.2 4.2 3.6   CHLORIDE 102  --  103 100   CO2 27  --  31 28   BUN 22.1*  --  15 20.3*   CR 0.51*  --  0.58* 0.66*   ANIONGAP 15  --  6 12   TORY 9.0  --  9.1 9.7   *  --  423* 374*    Most Recent 2 LFT's:  Recent Labs   Lab Test 09/13/22  0852 09/12/22  1642   AST 11 20   ALT 36 31   ALKPHOS 109 105   BILITOTAL 0.9 0.7    Most Recent TSH and T4:  Recent Labs   Lab Test 09/12/22  1642   TSH 2.56        ASSESSMENT AND PLAN:  Stage IV NSCLC, Rt lung adenocarcinoma with metastasis to pleura, mediastinum , rt pleural effusion and brain diagnosed 1/2022 (AJCC 8th edition)  PD-L1 TPS 2-3% by [place]   NGS [facility] panel-EML4:ALK rearragement; chr2:[id number], chr2:[id number]  NGS Guardant- GNAS R201H, KRAS K5E- No ALK    He began Alectinib 600 mg BID 3/2/22 and unfortunately developed grade 3 myalgias which have improved with lowering the dose 450 mg BID. He was holding drug 4/2 to 4/11 due to MSSA infection from pleurex which is removed. Resumed at 450 mg BID. 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  Initial CT with good CT in the chest. Had pleuretic right sided chest pain so CT chest on 9/1 which shows ongoing CT in chest. Due to ongoing myalgias (Although CK is normal), we dose reduced to 300 mg BID--recently restarted this dosing 1 weeks ago due to delay of refill.   -Continue alectinib 300 mg BID    Plan  Hold bevacizumab today due to HTN  Video with me next Tues   RTC for labs and imaging next Wed at , add bevacizumab   Follow up with Dr. Persaud Oct 31 in follow up of imaging    # Brain mets:  # Radiation necrosis  Baseline Brain MRI with several brain mets, s/p GK to 11-12 brain mets. F/u Brain MRI in June was  showing enlargement of the one of the lesions along with edema, therefore had to undergo craniotomy followed by resection, the final biopsy consistent with radiation necrosis.  Had issues with radiation necrosis and swelling requiring steroids but these were driving up blood sugars drastically  -Began bevacizumab for radiation necrosis so that we can wean off of the steroids and control the radiation necrosis--15 mg/kg every 3 weeks, plan for at least 3 months of therapy. Dexamethasone taper through 10/24 as outlined in chart on 9/30/22  -Next Brain MRI 10/26/2022    # G3 diastolic HTN today in clinic so need to hold bevacizumab as above  -Monitor BP at home daily. Increase lisinopril to 40 mg, continue hydrochlorothiazide    #grade 3 myalgias: CK has been normal. initially resolved after lowering dose though Connor recalls it has never really improved. ?unclear etiology?  Monitor with reduced 300 mg BID dose    #hypokalemia: mild and he denies PO intake limitations thus will recheck with next weeks labs that are already scheduled    #normocytic anemia: sudden drop to <7, requiring 1 unit blood transfusion. Lab work up unrevealing. Has recovered to baseline  -consider EGD if hgb drops again, risk for GI bleed with chronic steroid use    # T2 DM- A1c 10.1.    -F/u with PCP for BS. Sugar better today with recent addition of lantus    #grade 2 fatigue: ongoing. Monitor any improvement with dose reduction     #MSSA infection, Sepsis at pleurex site- resolved  # Pleural effusion: s/p pleurex palcement  2/16/22. Then on 4/2 right PleurX catheter s/p removal for severe sepsis due to MSSA infection.    #dry eyes  #blurred vision  Continue artificial tears. Saw ophthalmology previosuly, changed prescription, mow improved.  No neuro sx.     #Psychiatry  # Situational Anxiety   - irritable at times, may be better off steroids. Declined referral to therapist and denies medication intervention for now. PCP can help manage this  too if he has established relationship with him    #COVID vaccine: No COVID vaccine on record, did not discuss today.     60 minutes spent on the date of the encounter doing chart review, review of test results, interpretation of tests, patient visit, documentation and discussion with other provider(s)     Chelsea Villegas CNP on 10/18/2022 at 1:56 PM

## 2022-10-18 NOTE — NURSING NOTE
Chief Complaint   Patient presents with     Blood Draw     Labs drawn with piv start by rn.  VS taken.     Labs drawn with PIV start by rn.  Pt tolerated well.  VS taken (elevated bps x 2--see flowsheet--relayed to provider) and pt checked in for next appt.    Giuliana Silvestre RN

## 2022-10-19 ENCOUNTER — MYC MEDICAL ADVICE (OUTPATIENT)
Dept: ONCOLOGY | Facility: CLINIC | Age: 42
End: 2022-10-19

## 2022-10-20 NOTE — TELEPHONE ENCOUNTER
[facility] Triage - Mailbox full [phone] - [place]yl[place] [phone] LM to have her let Connor know to call triage at [phone] option 5 option 2    [employer] [place] Message: pain management    Virtual visit with Chelsea Villegas on 10/25/22

## 2022-10-24 NOTE — TELEPHONE ENCOUNTER
Mailbox is full and can not accept messages.     Forwarded message to Chelsea Villegas so she is aware of information for appointment tomorrow.

## 2022-10-25 DIAGNOSIS — C34.31 MALIGNANT NEOPLASM OF LOWER LOBE OF RIGHT LUNG (H): Primary | ICD-10-CM

## 2022-10-25 NOTE — PROGRESS NOTES
Medication Therapy Management (MTM) Encounter    ASSESSMENT:                            Medication Adherence/Access: See below for considerations; note that he continues to take Alecensa even though oncology note on 10/19 said to hold    History of metastatic lung cancer, multiple brain metastases: appointment scheduled for follow-up 10/31  continues to work with oncology and neurosurgery.  Continues alectinib and received bevacizumab this week which may had to hyper glycemia/hypertension, weaning off dexamethasone.  Per Anna-comp alectinib: hyperglycemia (22% to 36%),  Bevacizumab-Hyperglycemia (26%), hypertensive crisis     Type 2 Diabetes: Self monitoring of blood glucose is not at goal of fasting  mg/dL. Patient would benefit from Metformin :  Start at dose : 500mg ER every day now that his contrast dye is done.  Hoping this will help with insulin resistance.  Would like him to take rapid acting insulin before meals, will change his scale that he is using to cover meals.  Per [employer] online formulary, some preferred options: Bydureon, Victoza, Jardiance, Tradjenta, pioglitazone, glipizide   We discussed treatment of hypoglycemia today.    Hypertension: Patient is not meeting blood pressure goal of < 130/80mmHg. Patient would benefit from the following changes - increasing the dose of lisinopril as oncology recommended, would possibly benefit from addition of amlodipine.      PLAN:                            Diabetes:  1.  Start using Mohan 2 continous glucose monitor  2.  Start Metformin ER 500mg once weekly with food and if tolerating with no nausea or diarrhea increase dose week 2 to metformin ER 1000mg (2 tablets) once daily with food; week 3 increase metformin ER 1500mg (3 tablets) once daily with food  3.  Novolog scale with correction--be sure to check blood sugars before eating and taking insulin with a meal    An insulin correction scale use 1 unit rapid-acting insulin to correct for every 20  mg/dL that blood glucose is above 150 mg/dL.    <100 mg/dL:        take no insulin injections  100-150 mg/dL:    2 units  150-169 mg/dL:    3 unit  170-189 mg/dL:    4 units  190-209 mg/dL:    5 units  210-229 mg/dL:    6 units   230-249 mg/dL:    7 units    250-269 mg/dL:    8 units  270-289 mg/dL:    10 units  290-309 mg/dL:    12 units  310-329 mg/dL:    14 units  330-349 mg/dL:    16 units  350-369 mg/dL:    18 units  370-389 mg/dL:    20 units  >390 mg/dL:         22 units and let us know you are running this high     High Blood Pressure: increase lisinopril 40mg every day   Check your blood pressure daily, your goal is <130/80, be sure to sit 5 minutes before checking    Follow-up: Return in 12 days (on 11/8/2022) for MTM Pharmacist Visit, phone visit.    SUBJECTIVE/OBJECTIVE:                          Connor Emerson is a 42 year old male called for a follow-up visit.  Today's visit is a follow-up MTM visit from 10/17/22     Reason for visit: diabetes and hypertension.    Allergies/ADRs: Reviewed in chart  Past Medical History: Reviewed in chart  Tobacco: He reports that he has never smoked. He has never used smokeless tobacco.  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  His wife sets them up for him.  Per patient, misses medication 0 times per week.   Medication barriers: none.   The patient fills medications at [place]: [facility]     History of metastatic lung cancer, multiple brain metastases: Current medication is alectinib 300mg twice daily, dexamethasone 1mg every day and a few left (cutting the 4mg dose in 1/4), will stop soon he states probably by the end of the week, Bevacizumab every 3 weeks (given 10/26)  Side effects: high blood sugars, lost appetite, high blood pressure  Certified for Medical Marijuana 9/13/22  Following up with Neurosurgery Dr. Moran and follows with Oncology  Dr. Persaud      Hemoglobin   Date Value Ref Range Status   10/18/2022 14.4 13.3 - 17.7 g/dL Final   04/27/2017 15.2 13.3 - 17.7 g/dL Final   ]    Type 2 Diabetes:  Currently taking Lantus 36 units twice a day since last visit, Novolog adjustment scale 1 unit per 20 over 150 mg/dL, takes after lunch and after dinner, he states can be 1 hour after.  Patient is not experiencing side effects.  Side effects: none did have metformin-nausea/upset stomach for 1 week in the past.  Overall feeling much better, less achy  Blood sugars from continous glucose monitor is different than his glucometer (runs lower)  Blood sugar monitoring: he has not been using his continous glucose monitor.  SMGB  150 on the morning and 250 in evenings after eating 1 hour  156 was lowest reading, yesterday morning  [place] before work and lunch and dinner not big appetite.  Hypoglycemia: reporting none  Hyperglycemia: none  Eye exam: done ~May  Foot exam: due  Aspirin: No  Statin: No   ACEi/ARB: No.   Urine Albumin:   Lab Results   Component Value Date    UCRR 23 02/24/2022    MICROL <5 02/24/2022    UMALCR  02/24/2022      Comment:      Unable to calculate:  Urine creatinine or albumin value below detectable level     Lab Results   Component Value Date    A1C 12.3 06/24/2022    A1C 10.1 01/19/2022    A1C 11.9 05/12/2020    A1C 6.3 08/10/2018    A1C 6.5 11/27/2017    A1C 6.4 06/29/2017    A1C 6.5 02/13/2017       Estimated Creatinine Clearance: 213.6 mL/min (A) (based on SCr of 0.51 mg/dL (L)).    GFR Estimate   Date Value Ref Range Status   10/18/2022 >90 >60 mL/min/1.73m2 Final     Comment:     Effective December 21, 2021 eGFRcr in adults is calculated using the 2021 CKD-EPI creatinine equation which includes age and gender (Minh amaya al., NEJM, DOI: 10.1056/RZZOzw6543523)   05/12/2020 >90 >60 mL/min/[1.73_m2] Final     Comment:     Non  GFR Calc  Starting 12/18/2018, serum creatinine based estimated GFR (eGFR) will be  "  calculated using the Chronic Kidney Disease Epidemiology Collaboration   (CKD-EPI) equation.       GFR, ESTIMATED POCT   Date Value Ref Range Status   09/01/2022 >60 >60 mL/min/1.73m2 Final     GFR Estimate If Black   Date Value Ref Range Status   05/12/2020 >90 >60 mL/min/[1.73_m2] Final     Comment:      GFR Calc  Starting 12/18/2018, serum creatinine based estimated GFR (eGFR) will be   calculated using the Chronic Kidney Disease Epidemiology Collaboration   (CKD-EPI) equation.       Estimated body mass index is 31.99 kg/m  as calculated from the following:    Height as of 9/28/22: 5' 9.02\" (1.753 m).    Weight as of 10/26/22: 216 lb 11.2 oz (98.3 kg).    Component      Latest Ref Rng & Units 9/29/2015   C-Peptide      0.9 - 6.9 ng/mL 6.4     Hypertension: Current medications include hydrochlorothiazide 25mg every day, lisinopril 20mg every day (he did not know he was to increase the lisinopril from his 10/18 oncology appointment).  Received potassium yesterday 40meq orally. Patient does self-monitor blood pressure. Work yesterday 150/high 90s.  Patient reports the following medication side effects: low potassium.  BP Readings from Last 3 Encounters:   10/26/22 (!) 144/97   10/18/22 (!) 162/102   10/03/22 (!) 181/111     Potassium   Date Value Ref Range Status   10/26/2022 3.3 (L) 3.4 - 5.3 mmol/L Final   09/13/2022 4.2 3.4 - 5.3 mmol/L Final   05/12/2020 3.9 3.4 - 5.3 mmol/L Final     Hyperlipidemia: Current therapy includes none, rosuvastatin on hold due to muscle achiness, this is resolving. Could be attributed to alectinib.  Recent Labs   Lab Test 02/24/22  1224 08/10/18  1110 11/11/16  1051 09/29/15  1604   CHOL 208* 182   < > 184   HDL 33* 36*   < > 33*   * 121*   < > 110   TRIG 338* 126   < > 204*   CHOLHDLRATIO  --   --   --  5.6*    < > = values in this interval not displayed.     Vaccines:  Most Recent Immunizations   Administered Date(s) Administered     HepA-Adult 04/10/2014 "     HepB-Adult 04/10/2014     Influenza (IIV3) PF 11/03/2010     Influenza Vaccine IM > 6 months Valent IIV4 (Alfuria,Fluzone) 10/06/2015     Pneumococcal 23 valent 10/06/2015     TDAP Vaccine (Adacel) 12/01/2011     Td (Adult), Adsorbed 12/27/2004     Today's Vitals: There were no vitals taken for this visit.  ----------------    I spent 23 minutes with this patient today. All changes were made via collaborative practice agreement with JERRY Alicia Ra, CNP. A copy of the visit note was provided to the patient's provider(s).    The patient was sent via [employer] a summary of these recommendations.     Nadege ChinchillaD 4 Student    Telemedicine Visit Details  Type of service:  Telephone visit  Start Time: 12:01pm  End Time: 12:24pm  Originating Location (pt. Location): Home      Distant Location (provider location):  On-site  Provider has received verbal consent for a visit from the patient? Yes     Medication Therapy Recommendations  HTN, goal below 140/90    Current Medication: lisinopril (ZESTRIL) 20 MG tablet (Discontinued)   Rationale: Dose too low - Dosage too low - Effectiveness   Recommendation: Increase Dose   Status: Accepted per CPA         Type 2 diabetes mellitus with hyperglycemia, with long-term current use of insulin (H)    Current Medication: insulin aspart (NOVOLOG PEN) 100 UNIT/ML pen   Rationale: Dose too low - Dosage too low - Effectiveness   Recommendation: Increase Dose - Increase monitoring, change to CGM   Status: Accepted per CPA          Current Medication: insulin glargine (LANTUS PEN) 100 UNIT/ML pen   Rationale: Synergistic therapy - Needs additional medication therapy - Indication   Recommendation: Start Medication - METFORMIN HCL ER PO   Status: Accepted per CPA

## 2022-10-26 ENCOUNTER — HOSPITAL ENCOUNTER (OUTPATIENT)
Dept: CT IMAGING | Facility: CLINIC | Age: 42
Discharge: HOME | End: 2022-10-26
Attending: NURSE PRACTITIONER
Payer: COMMERCIAL

## 2022-10-26 ENCOUNTER — HOSPITAL ENCOUNTER (OUTPATIENT)
Dept: MRI IMAGING | Facility: CLINIC | Age: 42
Discharge: HOME | End: 2022-10-26
Attending: STUDENT IN AN ORGANIZED HEALTH CARE EDUCATION/TRAINING PROGRAM
Payer: COMMERCIAL

## 2022-10-26 ENCOUNTER — INFUSION THERAPY VISIT (OUTPATIENT)
Dept: INFUSION THERAPY | Facility: CLINIC | Age: 42
End: 2022-10-26
Attending: STUDENT IN AN ORGANIZED HEALTH CARE EDUCATION/TRAINING PROGRAM
Payer: COMMERCIAL

## 2022-10-26 VITALS
RESPIRATION RATE: 16 BRPM | DIASTOLIC BLOOD PRESSURE: 97 MMHG | WEIGHT: 216.7 LBS | OXYGEN SATURATION: 98 % | HEART RATE: 68 BPM | TEMPERATURE: 97.4 F | SYSTOLIC BLOOD PRESSURE: 144 MMHG | BODY MASS INDEX: 31.99 KG/M2

## 2022-10-26 DIAGNOSIS — C34.31 MALIGNANT NEOPLASM OF LOWER LOBE OF RIGHT LUNG (H): ICD-10-CM

## 2022-10-26 DIAGNOSIS — C79.31 BRAIN METASTASIS: ICD-10-CM
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
## 2022-10-26 DIAGNOSIS — I67.89 RADIATION THERAPY INDUCED BRAIN NECROSIS: ICD-10-CM

## 2022-10-26 DIAGNOSIS — C34.31 MALIGNANT NEOPLASM OF LOWER LOBE OF RIGHT LUNG (H): Primary | ICD-10-CM

## 2022-10-26 LAB
ALBUMIN UR-MCNC: NEGATIVE MG/DL
CK SERPL-CCNC: 48 U/L (ref 39–308)
CRP SERPL-MCNC: 3.16 MG/L
ERYTHROCYTE [SEDIMENTATION RATE] IN BLOOD BY WESTERGREN METHOD: 5 MM/HR (ref 0–15)
LDH SERPL L TO P-CCNC: 289 U/L (ref 0–250)
MAGNESIUM SERPL-MCNC: 1.7 MG/DL (ref 1.7–2.3)
POTASSIUM SERPL-SCNC: 3.3 MMOL/L (ref 3.4–5.3)

## 2022-10-26 PROCEDURE — 258N000003 HC RX IP 258 OP 636: Performed by: STUDENT IN AN ORGANIZED HEALTH CARE EDUCATION/TRAINING PROGRAM

## 2022-10-26 PROCEDURE — 71260 CT THORAX DX C+: CPT

## 2022-10-26 PROCEDURE — 83615 LACTATE (LD) (LDH) ENZYME: CPT | Performed by: NURSE PRACTITIONER

## 2022-10-26 PROCEDURE — 84132 ASSAY OF SERUM POTASSIUM: CPT | Performed by: STUDENT IN AN ORGANIZED HEALTH CARE EDUCATION/TRAINING PROGRAM

## 2022-10-26 PROCEDURE — 81003 URINALYSIS AUTO W/O SCOPE: CPT | Performed by: STUDENT IN AN ORGANIZED HEALTH CARE EDUCATION/TRAINING PROGRAM

## 2022-10-26 PROCEDURE — 83735 ASSAY OF MAGNESIUM: CPT | Performed by: STUDENT IN AN ORGANIZED HEALTH CARE EDUCATION/TRAINING PROGRAM

## 2022-10-26 PROCEDURE — 70553 MRI BRAIN STEM W/O & W/DYE: CPT

## 2022-10-26 PROCEDURE — 82085 ASSAY OF ALDOLASE: CPT | Performed by: NURSE PRACTITIONER

## 2022-10-26 PROCEDURE — 85652 RBC SED RATE AUTOMATED: CPT | Performed by: NURSE PRACTITIONER

## 2022-10-26 PROCEDURE — 250N000011 HC RX IP 250 OP 636: Performed by: NURSE PRACTITIONER

## 2022-10-26 PROCEDURE — 255N000002 HC RX 255 OP 636: Performed by: STUDENT IN AN ORGANIZED HEALTH CARE EDUCATION/TRAINING PROGRAM

## 2022-10-26 PROCEDURE — 86140 C-REACTIVE PROTEIN: CPT | Performed by: NURSE PRACTITIONER

## 2022-10-26 PROCEDURE — 36415 COLL VENOUS BLD VENIPUNCTURE: CPT

## 2022-10-26 PROCEDURE — A9585 GADOBUTROL INJECTION: HCPCS | Performed by: STUDENT IN AN ORGANIZED HEALTH CARE EDUCATION/TRAINING PROGRAM

## 2022-10-26 PROCEDURE — 96413 CHEMO IV INFUSION 1 HR: CPT

## 2022-10-26 PROCEDURE — 82550 ASSAY OF CK (CPK): CPT | Performed by: NURSE PRACTITIONER

## 2022-10-26 PROCEDURE — 250N000013 HC RX MED GY IP 250 OP 250 PS 637: Performed by: STUDENT IN AN ORGANIZED HEALTH CARE EDUCATION/TRAINING PROGRAM

## 2022-10-26 PROCEDURE — 250N000009 HC RX 250: Performed by: NURSE PRACTITIONER

## 2022-10-26 PROCEDURE — 250N000011 HC RX IP 250 OP 636: Performed by: STUDENT IN AN ORGANIZED HEALTH CARE EDUCATION/TRAINING PROGRAM

## 2022-10-26 RX ORDER — IOPAMIDOL 755 MG/ML
500 INJECTION, SOLUTION INTRAVASCULAR ONCE
Status: COMPLETED | OUTPATIENT
Start: 2022-10-26 | End: 2022-10-26

## 2022-10-26 RX ORDER — POTASSIUM CHLORIDE 1500 MG/1
40 TABLET, EXTENDED RELEASE ORAL ONCE
Status: COMPLETED | OUTPATIENT
Start: 2022-10-26 | End: 2022-10-26

## 2022-10-26 RX ORDER — GADOBUTROL 604.72 MG/ML
10 INJECTION INTRAVENOUS ONCE
Status: COMPLETED | OUTPATIENT
Start: 2022-10-26 | End: 2022-10-26

## 2022-10-26 RX ADMIN — SODIUM CHLORIDE 65 ML: 9 INJECTION, SOLUTION INTRAVENOUS at 11:49

## 2022-10-26 RX ADMIN — SODIUM CHLORIDE 250 ML: 9 INJECTION, SOLUTION INTRAVENOUS at 09:43

## 2022-10-26 RX ADMIN — GADOBUTROL 10 ML: 604.72 INJECTION INTRAVENOUS at 11:09

## 2022-10-26 RX ADMIN — POTASSIUM CHLORIDE 40 MEQ: 1500 TABLET, EXTENDED RELEASE ORAL at 09:54

## 2022-10-26 RX ADMIN — BEVACIZUMAB 1400 MG: 400 INJECTION, SOLUTION INTRAVENOUS at 10:14

## 2022-10-26 RX ADMIN — IOPAMIDOL 90 ML: 755 INJECTION, SOLUTION INTRAVENOUS at 11:47

## 2022-10-26 ASSESSMENT — PAIN SCALES - GENERAL: PAINLEVEL: NO PAIN (0)

## 2022-10-26 NOTE — PROGRESS NOTES
Infusion Nursing Note:  Connor Emerson presents today for Labs + Cycle 2 Avastin.    Patient seen by provider today: No.  Patient missed appointment with Chelsea Villegas CNP on 10/25/22.   present during visit today: Not Applicable.    Note:   Patient reports is feeling well today.  Patient denies fevers, chills or signs of infection.    BP = 144/97.    Writer spoke with Dr. Persaud today regarding missed appointment/K+ of 3.3 and BP today.  TORB Dr. Persaud/Deidre Casanova RN:  Okay to proceed with Avastin today.  Please replace K+ today per Electrolyte Replacement Protocol.  Patient needs to monitor his BP at home.  Patient needs to contact Dr. Persaud if BP >/= 160/100.  -Writer reviewed plan with patient.  Patient verbalized understanding of plan.  -Patient confirms that he does have a BP machine at home.  -Writer instructed patient to check his BP daily.    KCL 40meq PO x 1 given today per Electrolyte Replacement Protocol.    Intravenous Access:  Labs drawn without difficulty.  Peripheral IV placed.  PIV site C/D/I.  PIV flushes well + excellent blood return.  PIV to be discontinued by Imaging Department after completion of MRI and CT scan today.    K+ = 3.3  MG = 1.7  Protein Qualitative Urine = Negative    Treatment Conditions:  Results reviewed, labs MET treatment parameters, ok to proceed with treatment.    Post Infusion Assessment:  Patient tolerated infusion without incident.  Blood return noted pre and post infusion.  Site patent and intact, free from redness, edema or discomfort.  No evidence of extravasations.  Access discontinued per protocol.     Discharge Plan:   Discharge instructions reviewed with: Patient.  Patient and/or family verbalized understanding of discharge instructions and all questions answered.  Patient discharged in stable condition accompanied by: self.  Departure Mode: Ambulatory.  10/31/22: Appointment with Dr. Persaud.  11/16/22: Labs, Appointment with Lili Alvarenga PA-C +  Cycle 3 Avastin.    Deidre Casanova, RN, BSN, OCN on 10/26/2022 at 11:03 AM

## 2022-10-27 ENCOUNTER — VIRTUAL VISIT (OUTPATIENT)
Dept: PHARMACY | Facility: CLINIC | Age: 42
End: 2022-10-27
Payer: COMMERCIAL

## 2022-10-27 DIAGNOSIS — C34.31 MALIGNANT NEOPLASM OF LOWER LOBE OF RIGHT LUNG (H): ICD-10-CM

## 2022-10-27 DIAGNOSIS — C79.31 BRAIN METASTASIS: ICD-10-CM

## 2022-10-27 DIAGNOSIS — I10 HTN, GOAL BELOW 140/90: ICD-10-CM

## 2022-10-27 DIAGNOSIS — Z79.4 TYPE 2 DIABETES MELLITUS WITH HYPERGLYCEMIA, WITH LONG-TERM CURRENT USE OF INSULIN (H): Primary | ICD-10-CM

## 2022-10-27 DIAGNOSIS — E11.65 TYPE 2 DIABETES MELLITUS WITH HYPERGLYCEMIA, WITH LONG-TERM CURRENT USE OF INSULIN (H): Primary | ICD-10-CM

## 2022-10-27 LAB — ALDOLASE SERPL-CCNC: 3.5 U/L

## 2022-10-27 PROCEDURE — 99606 MTMS BY PHARM EST 15 MIN: CPT | Performed by: PHARMACIST

## 2022-10-27 PROCEDURE — 99607 MTMS BY PHARM ADDL 15 MIN: CPT | Performed by: PHARMACIST

## 2022-10-27 RX ORDER — LISINOPRIL 40 MG/1
40 TABLET ORAL DAILY
Qty: 30 TABLET | Refills: 2 | Status: SHIPPED | OUTPATIENT
Start: 2022-10-27 | End: 2023-04-06

## 2022-10-27 RX ORDER — METFORMIN HCL 500 MG
TABLET, EXTENDED RELEASE 24 HR ORAL
Qty: 90 TABLET | Refills: 2 | Status: SHIPPED | OUTPATIENT
Start: 2022-10-27 | End: 2023-04-06

## 2022-10-27 NOTE — PATIENT INSTRUCTIONS
Recommendations from today's MTM visit:                                                    Diabetes:  1.  Start using Mohan 2 continous glucose monitor  2.  Start Metformin ER 500mg once weekly with food and if tolerating with no nausea or diarrhea increase dose week 2 to metformin ER 1000mg (2 tablets) once daily with food; week 3 increase metformin ER 1500mg (3 tablets) once daily with food  3.  Novolog scale with correction--be sure to check blood sugars before eating and taking insulin with a meal    An insulin correction scale use 1 unit rapid-acting insulin to correct for every 20 mg/dL that blood glucose is above 150 mg/dL.    <100 mg/dL:        take no insulin injections  100-150 mg/dL:    2 units  150-169 mg/dL:    3 unit  170-189 mg/dL:    4 units  190-209 mg/dL:    5 units  210-229 mg/dL:    6 units   230-249 mg/dL:    7 units    250-269 mg/dL:    8 units  270-289 mg/dL:    10 units  290-309 mg/dL:    12 units  310-329 mg/dL:    14 units  330-349 mg/dL:    16 units  350-369 mg/dL:    18 units  370-389 mg/dL:    20 units  >390 mg/dL:         22 units and let us know you are running this high     High Blood Pressure: increase lisinopril 40mg every day   Check your blood pressure daily, your goal is <130/80, be sure to sit 5 minutes before checking    Follow-up: Return in 12 days (on 11/8/2022) for MTM Pharmacist Visit, phone visit.    To schedule another MTM appointment, please call the clinic directly or you may call the MTM scheduling line at [phone] or toll-free at [phone].     My Clinical Pharmacist's contact information:                                                      It was a pleasure seeing you today!  Please feel free to contact me with any questions or concerns you have.      Ester Carl, PharmD [facility]  Medication Therapy Management Practitioner   #[phone]    It was great to speak with you today.  I value your experience and would be very thankful for your time with  providing feedback on our clinic survey. You may receive a survey via email or text message in the next few days.

## 2022-10-27 NOTE — Clinical Note
Seeing you 10/31, note he is still taking alectinib, phone encounter 10/19 said to stop. Off the rosuvastatin.  Increased the lisinopril today but thinking he will need to start amlodipine.  Also starting metformin to help insulin resistance and hoping he will start using his continous glucose monitor again.  11/8 is my next follow-up Ester Carl, PharmD [facility] Medication Therapy Management Practitioner  #[phone]

## 2022-10-31 DIAGNOSIS — C79.31 BRAIN METASTASIS: ICD-10-CM

## 2022-10-31 DIAGNOSIS — C34.31 MALIGNANT NEOPLASM OF LOWER LOBE OF RIGHT LUNG (H): ICD-10-CM

## 2022-10-31 RX ORDER — LEVETIRACETAM 1000 MG/1
1000 TABLET ORAL 2 TIMES DAILY
Qty: 60 TABLET | Refills: 0 | Status: SHIPPED | OUTPATIENT
Start: 2022-10-31 | End: 2022-11-17

## 2022-10-31 NOTE — TELEPHONE ENCOUNTER
Levetiracetam Refill   Last prescribing provider: Dr Persaud     Last clinic visit date: 10/18/22 Chelsea Villegas     Recommendations for requested medication (if none, N/A): Copied from chart note 10/18/22 Chelsea Villegas     levETIRAcetam (KEPPRA) 1000 MG tablet       Any other pertinent information (if none, N/A): N/A    Refilled: Y/N, if NO, why?

## 2022-11-02 ENCOUNTER — MYC MEDICAL ADVICE (OUTPATIENT)
Dept: ONCOLOGY | Facility: CLINIC | Age: 42
End: 2022-11-02

## 2022-11-02 ENCOUNTER — PATIENT OUTREACH (OUTPATIENT)
Dept: ONCOLOGY | Facility: CLINIC | Age: 42
End: 2022-11-02

## 2022-11-02 NOTE — PROGRESS NOTES
Reached out to Pt a few times since missed 13/31 appt, GEOVANNY full. Mycharted message to reach out to clinic,

## 2022-11-07 ENCOUNTER — ONCOLOGY VISIT (OUTPATIENT)
Dept: ONCOLOGY | Facility: CLINIC | Age: 42
End: 2022-11-07
Attending: STUDENT IN AN ORGANIZED HEALTH CARE EDUCATION/TRAINING PROGRAM
Payer: COMMERCIAL

## 2022-11-07 ENCOUNTER — ANCILLARY PROCEDURE (OUTPATIENT)
Dept: CT IMAGING | Facility: CLINIC | Age: 42
End: 2022-11-07
Attending: STUDENT IN AN ORGANIZED HEALTH CARE EDUCATION/TRAINING PROGRAM
Payer: COMMERCIAL

## 2022-11-07 ENCOUNTER — NURSE TRIAGE (OUTPATIENT)
Dept: ONCOLOGY | Facility: CLINIC | Age: 42
End: 2022-11-07

## 2022-11-07 ENCOUNTER — HOSPITAL ENCOUNTER (INPATIENT)
Facility: CLINIC | Age: 42
LOS: 2 days | Discharge: HOME | End: 2022-11-09
Attending: EMERGENCY MEDICINE | Admitting: INTERNAL MEDICINE
Payer: COMMERCIAL

## 2022-11-07 VITALS
TEMPERATURE: 98.1 F | SYSTOLIC BLOOD PRESSURE: 161 MMHG | HEART RATE: 103 BPM | WEIGHT: 218.5 LBS | OXYGEN SATURATION: 99 % | DIASTOLIC BLOOD PRESSURE: 116 MMHG | BODY MASS INDEX: 32.25 KG/M2 | RESPIRATION RATE: 16 BRPM

## 2022-11-07 DIAGNOSIS — R07.9 CHEST PAIN, UNSPECIFIED TYPE: ICD-10-CM

## 2022-11-07 DIAGNOSIS — Z20.822 CONTACT WITH AND (SUSPECTED) EXPOSURE TO COVID-19: ICD-10-CM

## 2022-11-07 DIAGNOSIS — R51.9 NONINTRACTABLE EPISODIC HEADACHE, UNSPECIFIED HEADACHE TYPE: ICD-10-CM

## 2022-11-07 DIAGNOSIS — I26.93 SINGLE SUBSEGMENTAL PULMONARY EMBOLISM WITHOUT ACUTE COR PULMONALE (H): Primary | ICD-10-CM

## 2022-11-07 DIAGNOSIS — C34.31 PRIMARY MALIGNANT NEOPLASM OF RIGHT LOWER LOBE OF LUNG (H): ICD-10-CM

## 2022-11-07 DIAGNOSIS — C79.31 BRAIN METASTASIS: ICD-10-CM

## 2022-11-07 DIAGNOSIS — R51.9 NONINTRACTABLE HEADACHE, UNSPECIFIED CHRONICITY PATTERN, UNSPECIFIED HEADACHE TYPE: Primary | ICD-10-CM

## 2022-11-07 DIAGNOSIS — I10 ESSENTIAL HYPERTENSION, BENIGN: ICD-10-CM

## 2022-11-07 DIAGNOSIS — I26.99 PULMONARY EMBOLISM (H): ICD-10-CM

## 2022-11-07 DIAGNOSIS — I26.99 PULMONARY EMBOLISM, UNSPECIFIED CHRONICITY, UNSPECIFIED PULMONARY EMBOLISM TYPE, UNSPECIFIED WHETHER ACUTE COR PULMONALE PRESENT (H): ICD-10-CM

## 2022-11-07 DIAGNOSIS — R51.9 NONINTRACTABLE HEADACHE, UNSPECIFIED CHRONICITY PATTERN, UNSPECIFIED HEADACHE TYPE: ICD-10-CM

## 2022-11-07 DIAGNOSIS — C34.31 MALIGNANT NEOPLASM OF LOWER LOBE OF RIGHT LUNG (H): Primary | ICD-10-CM

## 2022-11-07 LAB
ALBUMIN SERPL BCG-MCNC: 4.1 G/DL (ref 3.5–5.2)
ALP SERPL-CCNC: 79 U/L (ref 40–129)
ALT SERPL W P-5'-P-CCNC: 23 U/L (ref 10–50)
ANION GAP SERPL CALCULATED.3IONS-SCNC: 11 MMOL/L (ref 7–15)
AST SERPL W P-5'-P-CCNC: 20 U/L (ref 10–50)
BASOPHILS # BLD AUTO: 0.1 10E3/UL (ref 0–0.2)
BASOPHILS NFR BLD AUTO: 1 %
BILIRUB SERPL-MCNC: 1 MG/DL
BUN SERPL-MCNC: 16.7 MG/DL (ref 6–20)
CALCIUM SERPL-MCNC: 9.6 MG/DL (ref 8.6–10)
CHLORIDE SERPL-SCNC: 104 MMOL/L (ref 98–107)
CK SERPL-CCNC: 32 U/L (ref 39–308)
CREAT SERPL-MCNC: 0.68 MG/DL (ref 0.67–1.17)
D DIMER PPP FEU-MCNC: 0.51 UG/ML FEU (ref 0–0.5)
DEPRECATED HCO3 PLAS-SCNC: 28 MMOL/L (ref 22–29)
EOSINOPHIL # BLD AUTO: 0.3 10E3/UL (ref 0–0.7)
EOSINOPHIL NFR BLD AUTO: 3 %
ERYTHROCYTE [DISTWIDTH] IN BLOOD BY AUTOMATED COUNT: 13.5 % (ref 10–15)
GFR SERPL CREATININE-BSD FRML MDRD: >90 ML/MIN/1.73M2
GLUCOSE SERPL-MCNC: 192 MG/DL (ref 70–99)
HCT VFR BLD AUTO: 40.1 % (ref 40–53)
HGB BLD-MCNC: 14.3 G/DL (ref 13.3–17.7)
IMM GRANULOCYTES # BLD: 0 10E3/UL
IMM GRANULOCYTES NFR BLD: 0 %
LYMPHOCYTES # BLD AUTO: 2.3 10E3/UL (ref 0.8–5.3)
LYMPHOCYTES NFR BLD AUTO: 20 %
MCH RBC QN AUTO: 32.9 PG (ref 26.5–33)
MCHC RBC AUTO-ENTMCNC: 35.7 G/DL (ref 31.5–36.5)
MCV RBC AUTO: 92 FL (ref 78–100)
MONOCYTES # BLD AUTO: 0.8 10E3/UL (ref 0–1.3)
MONOCYTES NFR BLD AUTO: 7 %
NEUTROPHILS # BLD AUTO: 7.9 10E3/UL (ref 1.6–8.3)
NEUTROPHILS NFR BLD AUTO: 69 %
NRBC # BLD AUTO: 0 10E3/UL
NRBC BLD AUTO-RTO: 0 /100
PLATELET # BLD AUTO: 195 10E3/UL (ref 150–450)
POTASSIUM SERPL-SCNC: 3.6 MMOL/L (ref 3.4–5.3)
PROT SERPL-MCNC: 6.6 G/DL (ref 6.4–8.3)
RADIOLOGIST FLAGS: ABNORMAL
RBC # BLD AUTO: 4.34 10E6/UL (ref 4.4–5.9)
SODIUM SERPL-SCNC: 143 MMOL/L (ref 136–145)
TROPONIN T SERPL HS-MCNC: 11 NG/L
WBC # BLD AUTO: 11.4 10E3/UL (ref 4–11)

## 2022-11-07 PROCEDURE — 96365 THER/PROPH/DIAG IV INF INIT: CPT | Performed by: NURSE PRACTITIONER

## 2022-11-07 PROCEDURE — 250N000011 HC RX IP 250 OP 636: Performed by: EMERGENCY MEDICINE

## 2022-11-07 PROCEDURE — 120N000002 HC R&B MED SURG/OB UMMC

## 2022-11-07 PROCEDURE — 93010 ELECTROCARDIOGRAM REPORT: CPT | Performed by: INTERNAL MEDICINE

## 2022-11-07 PROCEDURE — 85379 FIBRIN DEGRADATION QUANT: CPT | Performed by: STUDENT IN AN ORGANIZED HEALTH CARE EDUCATION/TRAINING PROGRAM

## 2022-11-07 PROCEDURE — 82550 ASSAY OF CK (CPK): CPT | Performed by: STUDENT IN AN ORGANIZED HEALTH CARE EDUCATION/TRAINING PROGRAM

## 2022-11-07 PROCEDURE — G0463 HOSPITAL OUTPT CLINIC VISIT: HCPCS

## 2022-11-07 PROCEDURE — 93005 ELECTROCARDIOGRAM TRACING: CPT

## 2022-11-07 PROCEDURE — 85025 COMPLETE CBC W/AUTO DIFF WBC: CPT | Performed by: STUDENT IN AN ORGANIZED HEALTH CARE EDUCATION/TRAINING PROGRAM

## 2022-11-07 PROCEDURE — 96366 THER/PROPH/DIAG IV INF ADDON: CPT | Performed by: NURSE PRACTITIONER

## 2022-11-07 PROCEDURE — 99285 EMERGENCY DEPT VISIT HI MDM: CPT | Performed by: NURSE PRACTITIONER

## 2022-11-07 PROCEDURE — 99223 1ST HOSP IP/OBS HIGH 75: CPT | Mod: AI | Performed by: INTERNAL MEDICINE

## 2022-11-07 PROCEDURE — 36415 COLL VENOUS BLD VENIPUNCTURE: CPT | Performed by: STUDENT IN AN ORGANIZED HEALTH CARE EDUCATION/TRAINING PROGRAM

## 2022-11-07 PROCEDURE — 80053 COMPREHEN METABOLIC PANEL: CPT | Performed by: STUDENT IN AN ORGANIZED HEALTH CARE EDUCATION/TRAINING PROGRAM

## 2022-11-07 PROCEDURE — 99285 EMERGENCY DEPT VISIT HI MDM: CPT | Mod: 25 | Performed by: NURSE PRACTITIONER

## 2022-11-07 PROCEDURE — 84484 ASSAY OF TROPONIN QUANT: CPT | Performed by: STUDENT IN AN ORGANIZED HEALTH CARE EDUCATION/TRAINING PROGRAM

## 2022-11-07 PROCEDURE — 99215 OFFICE O/P EST HI 40 MIN: CPT | Performed by: STUDENT IN AN ORGANIZED HEALTH CARE EDUCATION/TRAINING PROGRAM

## 2022-11-07 PROCEDURE — 71275 CT ANGIOGRAPHY CHEST: CPT | Mod: GC | Performed by: RADIOLOGY

## 2022-11-07 PROCEDURE — 70450 CT HEAD/BRAIN W/O DYE: CPT | Mod: GC | Performed by: RADIOLOGY

## 2022-11-07 PROCEDURE — 96375 TX/PRO/DX INJ NEW DRUG ADDON: CPT | Performed by: NURSE PRACTITIONER

## 2022-11-07 RX ORDER — NICOTINE POLACRILEX 4 MG
15-30 LOZENGE BUCCAL
Status: DISCONTINUED | OUTPATIENT
Start: 2022-11-07 | End: 2022-11-09 | Stop reason: HOSPADM

## 2022-11-07 RX ORDER — AMOXICILLIN 250 MG
2 CAPSULE ORAL 2 TIMES DAILY PRN
Status: DISCONTINUED | OUTPATIENT
Start: 2022-11-07 | End: 2022-11-09 | Stop reason: HOSPADM

## 2022-11-07 RX ORDER — DEXTROSE MONOHYDRATE 25 G/50ML
25-50 INJECTION, SOLUTION INTRAVENOUS
Status: DISCONTINUED | OUTPATIENT
Start: 2022-11-07 | End: 2022-11-09 | Stop reason: HOSPADM

## 2022-11-07 RX ORDER — AMOXICILLIN 250 MG
1 CAPSULE ORAL 2 TIMES DAILY PRN
Status: DISCONTINUED | OUTPATIENT
Start: 2022-11-07 | End: 2022-11-09 | Stop reason: HOSPADM

## 2022-11-07 RX ORDER — IOPAMIDOL 755 MG/ML
71 INJECTION, SOLUTION INTRAVASCULAR ONCE
Status: COMPLETED | OUTPATIENT
Start: 2022-11-07 | End: 2022-11-07

## 2022-11-07 RX ORDER — HYDROCHLOROTHIAZIDE 25 MG/1
25 TABLET ORAL DAILY
Status: DISCONTINUED | OUTPATIENT
Start: 2022-11-08 | End: 2022-11-09 | Stop reason: HOSPADM

## 2022-11-07 RX ORDER — LISINOPRIL 20 MG/1
40 TABLET ORAL DAILY
Status: DISCONTINUED | OUTPATIENT
Start: 2022-11-08 | End: 2022-11-09 | Stop reason: HOSPADM

## 2022-11-07 RX ORDER — LABETALOL HYDROCHLORIDE 5 MG/ML
10 INJECTION, SOLUTION INTRAVENOUS EVERY 6 HOURS PRN
Status: DISCONTINUED | OUTPATIENT
Start: 2022-11-07 | End: 2022-11-08

## 2022-11-07 RX ORDER — LIDOCAINE 40 MG/G
CREAM TOPICAL
Status: DISCONTINUED | OUTPATIENT
Start: 2022-11-07 | End: 2022-11-09 | Stop reason: HOSPADM

## 2022-11-07 RX ORDER — HEPARIN SODIUM 10000 [USP'U]/100ML
0-5000 INJECTION, SOLUTION INTRAVENOUS CONTINUOUS
Status: DISPENSED | OUTPATIENT
Start: 2022-11-07 | End: 2022-11-08

## 2022-11-07 RX ORDER — POLYETHYLENE GLYCOL 3350 17 G/17G
17 POWDER, FOR SOLUTION ORAL DAILY PRN
Status: DISCONTINUED | OUTPATIENT
Start: 2022-11-07 | End: 2022-11-09 | Stop reason: HOSPADM

## 2022-11-07 RX ADMIN — IOPAMIDOL 71 ML: 755 INJECTION, SOLUTION INTRAVASCULAR at 16:18

## 2022-11-07 RX ADMIN — HEPARIN SODIUM 1800 UNITS/HR: 10000 INJECTION, SOLUTION INTRAVENOUS at 21:28

## 2022-11-07 ASSESSMENT — PAIN SCALES - GENERAL: PAINLEVEL: NO PAIN (0)

## 2022-11-07 ASSESSMENT — ACTIVITIES OF DAILY LIVING (ADL)
ADLS_ACUITY_SCORE: 35
ADLS_ACUITY_SCORE: 35

## 2022-11-07 NOTE — TELEPHONE ENCOUNTER
[facility] Triage    Jennifer:Question?! My lung hurts. Not just hurts but painful. I'm nauseated. I'm wheezing! And headaches, right where my incision. I've missed 3 days of work and 3 days last week. Maybe my headache is from the weaning off my steroids? Need to know what u want me to do? Thanks      854 AM unable to reach patient as his mailbox is full.

## 2022-11-07 NOTE — TELEPHONE ENCOUNTER
[facility] Triage    Patient has appt with Dr. Persaud on Wednesday    MyChart Message: lung hurts, nauseated, wheezing, headaches, missed work - since last week and has been in bed since.  Not eating.    Connor and his wife called back:    No fever  Lung pain - rates 8/10 - when he breathes in and coughs the lung pain is the entire right side.  Under arm to buttock area.  Drinking water - not eating  Head hurts - dull achey pain, unable to function, 10/10  Taking tylenol and IB and this doesn't touch the pain  Wednesday he had jaw pain also, sharp pain in his throat - but that has since gone away.  This is unlike him, he usually works 40 hours a week    Paged Chelsea Villegas to advise 1153 am   Routing to Cori Riley and Jamari Tran would like to have him see Cori today instead of Wednesday if possible.    Paged Dr. Persaud to advise  Dr. Persaud returned page and would like to see him today instead of Wednesday.    Connor (per his wife) will take the 230 appt today and arrive at 215 for check in.  Patient and wife are aware Wednesday's appt will be canceled.    CCOD notified of schedule change and that patient is aware.  CCOD will change appt day and time.

## 2022-11-07 NOTE — ED PROVIDER NOTES
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  Patient reports has been feeling fatigued for the past 2 weeks, unable to work his normal amount.  He has been having dull headaches intermittently.  Reports some slight shortness of breath with inability to take deep breaths.  He has had intermittent nausea and vomiting.  Denies fevers or chills, chest pain, vision changes or other neurological deficits, diarrhea or constipation.    Reports he has been regularly taking his antihypertensive medications.  Previously been on dexamethasone, tapering off since 10/21/2022.    Past Medical History  Past Medical History:   Diagnosis Date     Atypical chest pain 12/02/2013     Cancer (H)      Complication of anesthesia      Diabetes (H)      GERD (gastroesophageal reflux disease) 12/02/2013     HTN (hypertension) 05/14/2012     HTN, goal below 140/90 07/02/2013     Insomnia 02/21/2012     Mediastinal lymphadenopathy      Migraine headache 07/02/2013     Migraine with aura, without mention of intractable migraine without mention of status migrainosus      Pneumonia      Past Surgical History:   Procedure Laterality Date     BRONCHOSCOPY RIGID OR FLEXIBLE W/TRANSENDOSCOPIC ENDOBRONCHIAL ULTRASOUND GUIDED Bilateral 1/26/2022    Procedure: Right BRONCHOSCOPY, FIBEROPTIC, endobronchial ultrasound, pleural biopsy;  Surgeon: Dallin Agrawal MD;  Location: [facility] OR     INJECT BLOCK MEDIAL BRANCH CERVICAL/THORACIC/LUMBAR       INSERT CHEST TUBE Right 2/16/2022    Procedure: INSERTION, CATHETER, INTERCOSTAL, FOR DRAINAGE;  Surgeon:  Dallin Agrawal MD;  Location: [facility] GI     INSERT CHEST TUBE Right 3/9/2022    Procedure: INSERTION, CATHETER, INTERCOSTAL, FOR DRAINAGE;  Surgeon: Sushila Antonio MD;  Location: [facility] GI     IR CHEST TUBE REMOVAL TUNNELED RIGHT  4/2/2022     OPTICAL TRACKING SYSTEM CRANIOTOMY, EXCISE TUMOR, COMBINED Left 6/28/2022    Procedure: Left stealth craniotomy for tumor resection with motor mapping;  Surgeon: Stephen Moran MD;  Location:  OR     ORTHOPEDIC SURGERY      Ganesh. Rotator cuff repair.     PLEUROSCOPY N/A 1/26/2022    Procedure: Pleuroscopy with Pleural Biopsy;  Surgeon: Dallin Agrawal MD;  Location: [facility] OR     alectinib (ALECENSA) 150 MG CAPS  citalopram (CELEXA) 20 MG tablet  hydrochlorothiazide (HYDRODIURIL) 25 MG tablet  insulin aspart (NOVOLOG PEN) 100 UNIT/ML pen  insulin glargine (LANTUS PEN) 100 UNIT/ML pen  levETIRAcetam (KEPPRA) 1000 MG tablet  lisinopril (ZESTRIL) 40 MG tablet  metFORMIN (GLUCOPHAGE XR) 500 MG 24 hr tablet  oxyCODONE (ROXICODONE) 5 MG tablet  prochlorperazine (COMPAZINE) 10 MG tablet  alcohol swab prep pads  blood glucose (NO BRAND SPECIFIED) test strip  blood glucose calibration (NO BRAND SPECIFIED) solution  blood glucose monitoring (NO BRAND SPECIFIED) meter device kit  dexamethasone (DECADRON) 4 MG tablet  insulin pen needle (31G X 8 MM) 31G X 8 MM miscellaneous  thin (NO BRAND SPECIFIED) lancets      Allergies   Allergen Reactions     Vicodin [Hydrocodone-Acetaminophen] Nausea and Vomiting and GI Disturbance     Family History  Family History   Problem Relation Age of Onset     Unknown/Adopted Mother      Uterine Cancer Mother      Unknown/Adopted Father      Unknown/Adopted Maternal Grandmother      Unknown/Adopted Maternal Grandfather      Stomach Cancer Maternal Grandfather      Unknown/Adopted Paternal Grandmother      Unknown/Adopted Paternal Grandfather      Melanoma Maternal Uncle      Social History   Social History     Tobacco Use     Smoking status: Never      Smokeless tobacco: Never   Vaping Use     Vaping Use: Never used   Substance Use Topics     Alcohol use: Yes     Alcohol/week: 0.0 standard drinks     Comment: social     Drug use: No      Past medical history, past surgical history, medications, allergies, family history, and social history were reviewed with the patient. No additional pertinent items.       Review of Systems   Constitutional: Positive for activity change and fatigue. Negative for chills and fever.   HENT: Negative.    Eyes: Negative.    Respiratory: Positive for shortness of breath.    Cardiovascular: Negative.    Gastrointestinal: Positive for nausea and vomiting.   Endocrine: Negative.    Genitourinary: Negative.    Musculoskeletal: Negative.    Skin: Negative.    Allergic/Immunologic: Negative.    Neurological: Positive for headaches.   Hematological: Negative.    Psychiatric/Behavioral: Negative.      A complete review of systems was performed with pertinent positives and negatives noted in the HPI, and all other systems negative.    Physical Exam   BP: (!) 163/111  Pulse: 84  Temp: 98.4  F (36.9  C)  Resp: 16  SpO2: 98 %  Physical Exam  Constitutional:       General: He is not in acute distress.     Appearance: He is ill-appearing.   HENT:      Head: Normocephalic and atraumatic.   Cardiovascular:      Rate and Rhythm: Normal rate and regular rhythm.   Pulmonary:      Effort: Pulmonary effort is normal.      Breath sounds: Decreased breath sounds present.   Abdominal:      General: Bowel sounds are normal.      Palpations: Abdomen is soft.   Musculoskeletal:         General: Normal range of motion.      Cervical back: Normal range of motion and neck supple.   Skin:     General: Skin is warm and dry.      Capillary Refill: Capillary refill takes less than 2 seconds.   Neurological:      General: No focal deficit present.      Mental Status: He is alert.   Psychiatric:         Mood and Affect: Mood normal.         Behavior: Behavior normal.          ED Course      Procedures            Results for orders placed or performed during the hospital encounter of 11/07/22   Hemoglobin A1c     Status: Abnormal   Result Value Ref Range    Hemoglobin A1C 9.5 (H) <5.7 %   Heparin Unfractionated Anti Xa Level     Status: Abnormal   Result Value Ref Range    Anti Xa Unfractionated Heparin >1.10 (HH) For Reference Range, See Comment IU/mL    Narrative    Therapeutic Range: UFH: 0.25-0.50 IU/mL for low intensity dosing,  0.30-0.70 IU/mL for high intensity dosing DVT and PE.  This test is not validated for other direct factor X inhibitors (e.g. rivaroxaban, apixaban, edoxaban, betrixaban, fondaparinux) and should not be used for monitoring of other medications.   Extra Tube (Holman Draw)     Status: None    Narrative    The following orders were created for panel order Extra Tube (Holman Draw).  Procedure                               Abnormality         Status                     ---------                               -----------         ------                     Extra Green Top (Lithium...[[id number]]                      Final result                 Please view results for these tests on the individual orders.   Extra Green Top (Lithium Heparin) Tube     Status: None   Result Value Ref Range    Hold Specimen [facility]    Glucose by meter     Status: Abnormal   Result Value Ref Range    GLUCOSE BY METER POCT 151 (H) 70 - 99 mg/dL   Heparin Unfractionated Anti Xa Level     Status: Abnormal   Result Value Ref Range    Anti Xa Unfractionated Heparin >1.10 (HH) For Reference Range, See Comment IU/mL    Narrative    Therapeutic Range: UFH: 0.25-0.50 IU/mL for low intensity dosing,  0.30-0.70 IU/mL for high intensity dosing DVT and PE.  This test is not validated for other direct factor X inhibitors (e.g. rivaroxaban, apixaban, edoxaban, betrixaban, fondaparinux) and should not be used for monitoring of other medications.   Basic metabolic panel     Status: Abnormal   Result  Value Ref Range    Sodium 140 136 - 145 mmol/L    Potassium 3.2 (L) 3.4 - 5.3 mmol/L    Chloride 103 98 - 107 mmol/L    Carbon Dioxide (CO2) 23 22 - 29 mmol/L    Anion Gap 14 7 - 15 mmol/L    Urea Nitrogen 15.1 6.0 - 20.0 mg/dL    Creatinine 0.57 (L) 0.67 - 1.17 mg/dL    Calcium 8.4 (L) 8.6 - 10.0 mg/dL    Glucose 147 (H) 70 - 99 mg/dL    GFR Estimate >90 >60 mL/min/1.73m2   CBC with platelets     Status: Abnormal   Result Value Ref Range    WBC Count 8.4 4.0 - 11.0 10e3/uL    RBC Count 4.13 (L) 4.40 - 5.90 10e6/uL    Hemoglobin 13.6 13.3 - 17.7 g/dL    Hematocrit 39.7 (L) 40.0 - 53.0 %    MCV 96 78 - 100 fL    MCH 32.9 26.5 - 33.0 pg    MCHC 34.3 31.5 - 36.5 g/dL    RDW 13.8 10.0 - 15.0 %    Platelet Count 170 150 - 450 10e3/uL   Glucose by meter     Status: Abnormal   Result Value Ref Range    GLUCOSE BY METER POCT 164 (H) 70 - 99 mg/dL     Medications   heparin 25,000 units in 0.45% NaCl 250 mL ANTICOAGULANT infusion (1,500 Units/hr Intravenous New Bag 11/8/22 3956)   lidocaine 1 % 0.1-1 mL (has no administration in time range)   lidocaine (LMX4) cream (has no administration in time range)   sodium chloride (PF) 0.9% PF flush 3 mL (3 mLs Intracatheter Not Given 11/8/22 9923)   sodium chloride (PF) 0.9% PF flush 3 mL (has no administration in time range)   melatonin tablet 1 mg (has no administration in time range)   Patient is already receiving anticoagulation with heparin, enoxaparin (LOVENOX), warfarin (COUMADIN)  or other anticoagulant medication (has no administration in time range)   senna-docusate (SENOKOT-S/PERICOLACE) 8.6-50 MG per tablet 1 tablet (has no administration in time range)     Or   senna-docusate (SENOKOT-S/PERICOLACE) 8.6-50 MG per tablet 2 tablet (has no administration in time range)   polyethylene glycol (MIRALAX) Packet 17 g (has no administration in time range)   glucose gel 15-30 g (has no administration in time range)     Or   dextrose 50 % injection 25-50 mL (has no administration in  time range)     Or   glucagon injection 1 mg (has no administration in time range)   insulin aspart (NovoLOG) injection (RAPID ACTING) (1 Units Subcutaneous Given 11/8/22 0813)   insulin aspart (NovoLOG) injection (RAPID ACTING) (1 Units Subcutaneous Not Given 11/8/22 0145)   hydrochlorothiazide (HYDRODIURIL) tablet 25 mg (25 mg Oral Given 11/8/22 0814)   lisinopril (ZESTRIL) tablet 40 mg (40 mg Oral Given 11/8/22 0814)   labetalol (NORMODYNE/TRANDATE) injection 10 mg (10 mg Intravenous Given 11/8/22 0659)   ketorolac (TORADOL) injection 30 mg (has no administration in time range)   gadobutrol (GADAVIST) injection 10 mL (has no administration in time range)   heparin ANTICOAGULANT Loading dose for HIGH INTENSITY TREATMENT * Give BEFORE starting heparin infusion (7,950 Units Intravenous Given 11/7/22 2125)   potassium chloride ER (KLOR-CON M) CR tablet 40 mEq (40 mEq Oral Given 11/8/22 1009)        Assessments & Plan (with Medical Decision Making)   This is a 42-year-old male patient with a history of lung cancer as well as radiation induced brain necrosis sent in from his oncology clinic for hypertension and positive PE on CT scan.    Patient was Initially evaluated by the triage physician who ordered labs and heparin drip.    Patient's blood pressure elevated initially in triage at 163/111, however came down at the time of reassessment, currently 147/99 with a heart rate of 76 without intervention.  He is denying any headache at the moment.  We will continue to closely monitor and consider IV medications to help control his blood pressure to become elevated given that he has been compliant with taking his oral medications and presented with an elevated blood pressure despite this.    Heparin drip was initiated in the emergency department for treatment of his right-sided PE.    Consulted oncology who agreed with heparin drip and advised admission to medicine service with oncology following.  Was discussed with the  medicine team who agreed to admit him to their service.    Altered with neurology about concern for possible press, they advised MRI brain with and without contrast was ordered as well as IV antihypertensives to slowly bring down blood pressure over period of 4 to 5 hours to ultimate goal of 140/90.     I have reviewed the nursing notes. I have reviewed the findings, diagnosis, plan and need for follow up with the patient.    New Prescriptions    No medications on file       Final diagnoses:   Pulmonary embolism (H)       --  JERRY Pierre CNP  [facility] EMERGENCY DEPARTMENT  11/7/2022     Amber Link APRN CNP  11/08/22 1206    --    ED Attending Physician Attestation    I Reva Velez MD, cared for this patient with the Advanced Practice Provider (JULIO). I have performed a history and physical examination of the patient independent of the JULIO. I reviewed the JULIO's documentation above and agree with the documented findings and plan of care. I personally provided a substantive portion of the care for this patient.    I personally performed the substantive portion of the medical decision making for this visit - please see the JULIO's documentation for full details.    Key management decisions made by me and carried out under my direction: Evaluation and heparin    I personally performed the substantive portion of the history for this visit - please see the JULIO's documentation for full details.      I personally performed the substantive portion of the physical exam - please see the JULIO's documentation for full details.  I concur with the JULIO exam findings.      Summary of HPI, PE, ED Course   Patient is a 42 year old male evaluated in the emergency department for PE revealed CT done through clinic. Exam notable for NAD. ED course notable for patient started on heparin. After the completion of care in the emergency department, the patient was admitted to inpatient.    Critical Care &  Procedures  Not applicable.    Medical Decision Making  The medical record was reviewed and interpreted.  Current labs reviewed and interpreted.  Previous labs reviewed and interpreted.      Reva Velez MD  Emergency Medicine          Agustin, Reva LYNNE MD  11/30/22 6502

## 2022-11-07 NOTE — NURSING NOTE
EKG was performed today per order written by Bassam Persaud.  Name and  verified with patient. Patient tolerated well without incident. File transmitted to chart.    Flakito Lay on 2022 at 3:27 PM

## 2022-11-07 NOTE — NURSING NOTE
"Oncology Rooming Note    November 7, 2022 2:53 PM   Connor Emerson is a 42 year old male who presents for:    Chief Complaint   Patient presents with     Oncology Clinic Visit     Non-small cell lung cancer      Initial Vitals: BP (!) 161/116 (BP Location: Right arm, Patient Position: Sitting, Cuff Size: Adult Large)   Pulse 103   Temp 98.1  F (36.7  C) (Oral)   Resp 16   Wt 99.1 kg (218 lb 8 oz)   SpO2 99%   BMI 32.25 kg/m   Estimated body mass index is 32.25 kg/m  as calculated from the following:    Height as of 9/28/22: 1.753 m (5' 9.02\").    Weight as of this encounter: 99.1 kg (218 lb 8 oz). Body surface area is 2.2 meters squared.  No Pain (0) Comment: Data Unavailable   No LMP for male patient.  Allergies reviewed: Yes  Medications reviewed: Yes    Medications: Medication refills not needed today.  Pharmacy name entered into EPIC:    [facility] [place], [place] - [address]  [facility] [place], [place] [address] [place] [place] [address]   [facility] [place], [place] [id number] - [place], [place] - [address] [facility] AT [place] [address] & [id number] [place], [place] - [address] AT [facility] & [place] [address]    Clinical concerns: None      Haja Irizarry            "

## 2022-11-07 NOTE — PROGRESS NOTES
MEDICAL ONCOLOGY FOLLOW UP NOTE    PATIENT NAME: Connor Emerson  ENCOUNTER DATE: 11/7/2022    Care Team  Primary Oncologist: Bassam Persaud MD    REASON FOR CURRENT VISIT: F/u of lung cancer    HISTORY OF PRESENT ILLNESS:  Mr. Connor Emerson is a 42 year old  male who is a non-smoker with PMHx of T2DM, HTN with metastatic NSCLC comes for follow up     Oncologic Hx:    Diagnosis:   Stage IV NSCLC, Rt lung adenocarcinoma with metastasis to pleura, mediastinum , rt pleural effusion and brain diagnosed 1/2022 (AJCC 8th edition)  PD-L1 TPS 2-3% by Penelope   NGS [facility] panel-EML4:ALK rearragement  NGS Guardant- GNAS R201H, KRAS K5E- No ALK    Treatment:   Current:  3/2/22- now- Alectinib 300 mg BID (Dose reduced to 450 mg BID from 600 mg BID due to grade 3 myalgias 3/21/22, again reduced to 300 mg BID 9/28/22)  9/28/22- Bevacizumab for radiation necrosis  )  Past:  2/15/22- GK to 11 briain lesions    Intent of treatment: Palliative    Oncologic course:  1/19/22 to 1/22/22-Admitted to [facility] for 2 week progressive SOB secondary to have large rt sided pleural effusion, needing thoracentesis x2 (1.7L and 2.0 L removed), cytology positive for malignancy, adenocarcinoma.   1/26/22- Rt pleural mass biopsy-Dr. Agrawal--POSITIVE FOR ADENOCARCINOMA CONSISTENT WITH LUNG PRIMARY, admixed with mesothelial hyperplasia and inflammatory infiltrate (+ TTF-1 and CK 7;  negative  p40, calretinin and WT-1. PAX8 immunostain focal +). 4th thoracentesis done simultaneously - 3L approx removed.   2/1/22- PET/CT-Right lower lobe central infiltrative FDG avid 8.2 x 9.6 cm mass representing a primary lung adenocarcinoma. Ipsilateral right perihilar, bilateral pretracheal, subcarinal and superior mediastinal michele metastases. Contralateral mildly FDG avid few lung nodules are suspicious for contralateral metastasis. At least 3 intracranial metastases in the right frontal lobe, left frontal lobe and left cerebellar hemisphere. Nonspecific mild diffuse  bone marrow uptake. Further evaluation with a spine MRI could be considered to rule out early marrow infiltration. This could also be seen with red marrow conversion.  2/5/22-  Brain MRI- At least 9 intracranial metastases as detailed above. The dominant lesions involving the orbital right frontal lobe, the posterior left middle frontal gyrus, anterior right temporal lobe and in the left cerebellar hemisphere have surrounding moderate vasogenic type edema.  2/15/22- Saw Dr. Arango from Rad Onc- Rcd GK to 12 lesion in bran  2/16/22- Pleurex placement   3/2/22- Started Alectinib 600 mg BID  3/21/22- Dose reduced to 450 mg BID due to grade 3 myalgias and fatigue  4/2/22 to 4/5/22- Admitted at [facility] for- Severe sepsis due to MSSA infection of right PleurX catheter s/p removal- He presented with onset of pain at tube site starting 4/1; at arrival was tachycardic with leukocytosis (22.7) and elevated lactic acid (2.9).  CT chest showed fluid and stranding tracking outside the pleural space into chest wall along pleural catheter.  IR was consulted and removed catheter 4/2 with report of pustular drainage and tip culture growing MSSA.  Thoracic Surg was consulted who felt no surgical indication necessary given minimal pleural fluid and lack of any signs of abscess.  Initially treated with broad spectrum coverage for sepsis, narrowed to Ancef once sensitivities returned with plan to transition to cefadroxil for an additional 10 days at discharge per ID. Held drug 4/2 to 4/11 5/2/22- CT CAP- Overall, positive response to therapy with decreased size of right lower lobe and right pleural-based masses, pulmonary metastases, hilar and mediastinal lymphadenopathy. However, a single right posterior pleural-based mass has slightly increased in size since 2/24/2022. No metastatic disease in the abdomen and pelvis. Right Pleurx catheter has been removed. Trace right pleural effusion and right basilar atelectasis.  5/2/22-  Brain MRI- The previously demonstrated brain metastases are mildly diminished in size versus to 2/5/2022. The degree of edema is also diminished but not completely resolved. Probable trace amounts of intralesional bleeding demonstrated on the gradient sequence within the metastases. No definite new metastasis or progressive mass effect. No hydrocephalus or infarct.    6/15/22 to 6/17/22- Admitted at [facility]-with aphasia and word finding difficulty over last few weeks.  He presented to [facility] ED on 6/10 for evaluation of his symptoms. MRI brain showed multiple intracranial metastases, with interval enlargement of the dominant lesion within the left frontal lobe and increased surrounding vasogenic edema with 2 mm rightward shift of the septum pellucidum. Due to his worsening anxiety, he left AMA. His symptoms continued to progress to where he could not write at work so he decided to go to the ED for re-evaluation and treatment. Evaluated by JATINDER, Rad Onc (radiaiton necrosis vs tumor progression).  6/16/22- MR Brain (6/16) shows multiple intracranial metastases, with interval enlargement  of the dominant lesion within the left frontal lobe and increased surrounding vasogenic edema with 2 mm rightward shift of the septum pellucidum.  6/16/22- - CT CAP shows slightly decreased size of right lower lobe and right pleural-based masses. No new pulmonary nodules or lymphadenopathy; No evidence of metastatic disease in the abdomen or pelvis.   6/28/22 to 6/30/22- Admitted at [facility]- Elective left Stealth craniotomy with resection of brain tumor due to ongoing symptoms. No intraoperative complications. EBL 50 ml.  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  7/5/22- Ct CAP- Right lower lobe low-density nodules are not significantly changed. A small left upper lobe pulmonary nodule is also unchanged. Trace pleural fluid on the right has increased slightly. No convincing evidence for metastatic disease in the abdomen or  pelvis.  7/18/22 to 7/19/22- Admitted to [facility] for seizure- Reportedly was only taking once Keppra instead of twice daily. Also resumed on dexamethasone 2 mg daily  8/1/22- Brain MRI- Redemonstrated postsurgical changes status post left frontoparietal Craniotomy. Interval increase in size of the dominant ring-enhancing lesion in the left posterior superior frontal lobe with increased moderate surrounding vasogenic edema and local mass effect resulting in narrowing of the supratentorial ventricular system. No significant midline shift/herniation at this time  8/1/22- Dex was increased to 4 mg daily by Dr. Moran  9/1/22- CT Chest- Near resolution of previously seen right pleural nodule. Stable right lower lobe pulmonary nodule  9/28/22- Bevacizumab for radiation necrosis  10/26/22- Bevacizumab   10/26/22- Ct CAP- Stable posterior medial right lower lobe 1.9 x 1.1 cm nodule series 8 image 176. Adjacent stable scarring and atelectasis. The previously noted pleural nodule posteriorly on the right is not currently clearly identified. Stable left upper lobe 0.3 cm nodule image 56  10/26/22- Brain MRI- Overall improved appearance of multiple intracranial metastases with near resolution of associated edema and diminished enhancement and size of multiple residual lesions. No definite new or progressive metastasis.    He works as a maintenance manger for apartment complexes    Interval Hx:  Connor is here with his significant other after missing 2 last appointments with oncology.  Last dose of bevacizumab was on 10/26.  For the last 2 weeks he has been feeling terrible.  He has been spending most of the time in the bed and not going to work.  He typically works 40 hours/week but for the last 2 weeks has been missing 2 or 3 days in a week.  He says he is feels fatigued and has noticed new onset dull headaches in the vertex of the head,  intermittent, no aggravating or reliving factors, not associated in any focal neurological  deficits.  No new vision problems.  He is taking his antihypertensives lisinopril and HCTZ regularly and says that he has not missed any doses.  He has also noticed that he is having new right sided pleuritic chest pain on the lateral side that goes down a little bit, this is also new.  He has been tapered off of dexamethasone since 10/31.  He is currently taking alectinib 300 mg twice daily as prescribed.  He continues to have on and off body aches but overall stable, he has been off of his statin as well.  Breathing is stable, no cough, no fever or chills.  He has been mostly in bed from the last Wednesday  He has not been eating well for the past but for the last week or so  He has been doing  Tylenol, ibuprofen and ice packs with minimal  He has been sleeping in the bed most of the time, last Wednesday  He also noticed intemittent nausea/vomiitng, had 2 epsidoes of vomiting  Currently on 300 mg BID of alectinib      REVIEW OF SYSTEMS: 14 point ROS negative other than the symptoms noted above in the HPI.    Wt Readings from Last 4 Encounters:   10/26/22 98.3 kg (216 lb 11.2 oz)   10/18/22 99.1 kg (218 lb 6.4 oz)   10/03/22 99.2 kg (218 lb 9.6 oz)   09/28/22 98.1 kg (216 lb 4.8 oz)      Review of Systems:  A comprehensive ROS was performed and found to be negative or non-contributory with the exception of that noted in the HPI above.    Past Medical History:  GERD  Hypertension, not on medication  Type 2 diabetes mellitus, not on medications currently, previously on Metformin    Past Surgical History:  Past Surgical History:   Procedure Laterality Date     BRONCHOSCOPY RIGID OR FLEXIBLE W/TRANSENDOSCOPIC ENDOBRONCHIAL ULTRASOUND GUIDED Bilateral 1/26/2022    Procedure: Right BRONCHOSCOPY, FIBEROPTIC, endobronchial ultrasound, pleural biopsy;  Surgeon: Dallin Agrawal MD;  Location: [facility] OR     INJECT BLOCK MEDIAL BRANCH CERVICAL/THORACIC/LUMBAR       INSERT CHEST TUBE Right 2/16/2022    Procedure: INSERTION,  CATHETER, INTERCOSTAL, FOR DRAINAGE;  Surgeon: Dallin Agrawal MD;  Location:  GI     INSERT CHEST TUBE Right 3/9/2022    Procedure: INSERTION, CATHETER, INTERCOSTAL, FOR DRAINAGE;  Surgeon: Sushila Antonio MD;  Location:  GI     IR CHEST TUBE REMOVAL TUNNELED RIGHT  2022     OPTICAL TRACKING SYSTEM CRANIOTOMY, EXCISE TUMOR, COMBINED Left 2022    Procedure: Left stealth craniotomy for tumor resection with motor mapping;  Surgeon: Stephen Moran MD;  Location:  OR     ORTHOPEDIC SURGERY      Ganesh. Rotator cuff repair.     PLEUROSCOPY N/A 2022    Procedure: Pleuroscopy with Pleural Biopsy;  Surgeon: Dallin Agrawal MD;  Location:  OR       Social History:  Lives with wife and 4 kids in [place]. Works as a  for an apartment complex in [place]. Exposure to household chemicals and . No significant exposure to asbestos. No signal exposure to benzene or similar chemicals. No significant smoking history-states that he smoked 1 to 2 cigarettes occasionally per month for about 2 years in college, non-smoking since then. No significant alcohol use history. No other recreational substances. Good support system. Kids are 23, 19, 17 and 13.    Family History  Significant history for cancers on maternal side. Mother  of uterine cancer. 2 maternal uncles have possible metastatic melanoma.    Outpatient Medications:  Current Outpatient Medications   Medication     alcohol swab prep pads     alectinib (ALECENSA) 150 MG CAPS     blood glucose (NO BRAND SPECIFIED) test strip     blood glucose calibration (NO BRAND SPECIFIED) solution     blood glucose monitoring (NO BRAND SPECIFIED) meter device kit     citalopram (CELEXA) 20 MG tablet     Continuous Blood Gluc  (FREESTYLE IRENE 2 READER) YVES     Continuous Blood Gluc Sensor (FREESTYLE IRENE 2 SENSOR) MISC     dexamethasone (DECADRON) 4 MG tablet     hydrochlorothiazide (HYDRODIURIL) 25 MG tablet     insulin  aspart (NOVOLOG PEN) 100 UNIT/ML pen     insulin glargine (LANTUS PEN) 100 UNIT/ML pen     insulin pen needle (31G X 8 MM) 31G X 8 MM miscellaneous     levETIRAcetam (KEPPRA) 1000 MG tablet     lisinopril (ZESTRIL) 40 MG tablet     metFORMIN (GLUCOPHAGE XR) 500 MG 24 hr tablet     oxyCODONE (ROXICODONE) 5 MG tablet     prochlorperazine (COMPAZINE) 10 MG tablet     thin (NO BRAND SPECIFIED) lancets     No current facility-administered medications for this visit.       Physical Exam:    Blood pressure (!) 161/116, pulse 103, temperature 98.1  F (36.7  C), temperature source Oral, resp. rate 16, weight 99.1 kg (218 lb 8 oz), SpO2 99 %.   General: alert and cooperative, sitting up in chair  HEENT: sclera anicteric, EOMI, MMM. Pupils equal and reactive.   Resp: breathing comfortably   GI: soft, non-tender, non-distended, bowel sounds present and normoactive  MSK: warm and well-perfused, normal tone  Skin: no rashes on limited exam, no jaundice  Neuro: Alert awake and oriented x4, strength is 5 on 5 throughout, sensations are grossly intact    Labs & Studies: I personally reviewed the following studies:  Most Recent 3 CBC's:  Recent Labs   Lab Test 10/18/22  0833 09/13/22  0852 09/12/22  1642   WBC 13.7* 11.1* 13.8*   HGB 14.4 14.3 14.4   MCV 94 97 94    248 252     Most Recent 3 BMP's:  Recent Labs   Lab Test 10/26/22  0832 10/18/22  0833 09/28/22  1116 09/13/22  0852 09/12/22  1642   NA  --  144  --  140 140   POTASSIUM 3.3* 3.2* 4.2 4.2 3.6   CHLORIDE  --  102  --  103 100   CO2  --  27  --  31 28   BUN  --  22.1*  --  15 20.3*   CR  --  0.51*  --  0.58* 0.66*   ANIONGAP  --  15  --  6 12   TORY  --  9.0  --  9.1 9.7   GLC  --  151*  --  423* 374*    Most Recent 2 LFT's:  Recent Labs   Lab Test 10/18/22  0833 09/13/22  0852   AST 16 11   ALT 19 36   ALKPHOS 76 109   BILITOTAL 0.6 0.9    Most Recent TSH and T4:  Recent Labs   Lab Test 09/12/22  1642   TSH 2.56        ASSESSMENT AND PLAN:  Stage IV NSCLC, Rt lung  adenocarcinoma with metastasis to pleura, mediastinum , rt pleural effusion and brain diagnosed 1/2022 (AJCC 8th edition)  PD-L1 TPS 2-3% by [place]   NGS [facility] panel-EML4:ALK rearragement; chr2:[id number], chr2:[id number]  NGS Guardant- GNAS R201H, KRAS K5E- No ALK    He began Alectinib 600 mg BID 3/2/22 and unfortunately developed grade 3 myalgias which have improved with lowering the dose 450 mg BID. He was holding drug 4/2 to 4/11 due to MSSA infection from pleurex which is removed. Resumed at 450 mg BID. Initial CT with good HI in the chest. Had pleuretic right sided chest pain so CT chest on 9/1 which shows ongoing HI in chest. Due to ongoing myalgias (Although CK is normal), we dose reduced to 300 mg BID, will Continue alectinib 300 mg BID.  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    Howver, today has symptoms concerning for SE from bevacizumab.    Plan  Will continue alectinib 300 mg twice daily  Next CT in approximately 2 months  -=-We will do basic labs today, including CBC, CMP, troponin, CK and D-dimer, EKG (for work up below)  -ER referral (see below)      # Hypertensive urgency/emergency  -His blood pressure today is about 165/116.  This is despite taking his antihypertensive medications for today.  Due to his ongoing headaches, and really worried about posterior reversible encephalopathy syndrome(PRES), that is an rare but known side effect of bevacizumab.  We will try to image his brain with a CT of the head stat and send him to the ER for close monitoring and control of his blood pressure.  He might need intravenous antihypertensives  Discussed this with pt   -- ER referral    # New pleuritic chest pain  -Has new right-sided pleuritic chest pain, has previous history of cancer in the outside.  Due to ongoing tachycardiam, concern for VTE secondary to bevacizumab as well.  We will draw labs today and  get CT of the chest stat to rule out a PE.  -- ER referral       # Brain mets:  # Radiation necrosis  Baseline Brain MRI with several brain mets, s/p GK to 11-12 brain mets. F/u Brain MRI in June was showing enlargement of the one of the lesions along with edema, therefore had to undergo craniotomy followed by resection, the final biopsy consistent with radiation necrosis.  Had issues with radiation necrosis and swelling requiring steroids but these were driving up blood sugars drastically  -Began bevacizumab for radiation necrosis --15 mg/kg every 3 weeks, plan for at least 3 months of therapy.  Dexamethasone taper through 10/31. S/p 2 doses of Bevacixumab now, last one 10/26   -Recent Brain MRI showing Rx effect and resolution of radiation necrosis  Next Brain MRI 1/2023    # Type 2 Diabetes: Self monitoring of blood glucose is not at goal of fasting  mg/dL. Now off of dexamethasone. Started metformin 500mg ER every day    --Start using Mohan 2 continous glucose monitor  -- Metformin ER 500mg once weekly with food and if tolerating with no nausea or diarrhea increase dose week 2 to metformin ER 1000mg (2 tablets) once daily with food; week 3 increase metformin ER 1500mg (3 tablets) once daily with food  -- Novolog scale with correction--be sure to check blood sugars before eating and taking insulin with a meal        # G3 diastolic HTN - led to one dose hold of bevacizumab   -Monitor BP at home daily. On  lisinopril to 40 mg, and hydrochlorothiazide, will cosndier adding amlodipine  -see above     #grade 3 myalgias: CK has been normal. initially resolved after lowering dose though Connor recalls it has never really improved. ?unclear etiology?  Monitor with reduced 300 mg BID dose  -off of rosuvastatin now    #hypokalemia: mild and he denies PO intake limitations thus will recheck with next weeks labs that are already scheduled    #normocytic anemia: sudden drop to <7, requiring 1 unit blood transfusion. Lab  work up unrevealing. Has recovered to baseline  -consider EGD if hgb drops again, risk for GI bleed with chronic steroid use      #grade 2 fatigue: ongoing. Monitor any improvement with dose reduction     #MSSA infection, Sepsis at pleurex site- resolved  # Pleural effusion: s/p pleurex palcement  2/16/22. Then on 4/2 right PleurX catheter s/p removal for severe sepsis due to MSSA infection.    #dry eyes  #blurred vision  Continue artificial tears. Saw ophthalmology previosuly, changed prescription, mow improved.  No neuro sx.     #Psychiatry  # Situational Anxiety   - irritable at times, may be better off steroids. Declined referral to therapist and denies medication intervention for now. PCP can help manage this too if he has established relationship with him    #COVID vaccine: No COVID vaccine on record, did not discuss today.       On the day of service  Chart review: 5 minutes  Visit duration: 50 minutes  Care coordination: 5 minutes    Bassam Persaud MD    Hematology, Oncology and Transplantation

## 2022-11-07 NOTE — LETTER
11/7/2022         RE: Connor Emerson  [address]  [facility] [address]        Dear Colleague,    Thank you for referring your patient, Connor Emerson, to the [facility]. Please see a copy of my visit note below.    MEDICAL ONCOLOGY FOLLOW UP NOTE    PATIENT NAME: Connor Emerson  ENCOUNTER DATE: 11/7/2022    Care Team  Primary Oncologist: Bassam Persaud MD    REASON FOR CURRENT VISIT: F/u of lung cancer    HISTORY OF PRESENT ILLNESS:  Mr. Connor Emerson is a 42 year old  male who is a non-smoker with PMHx of T2DM, HTN with metastatic NSCLC comes for follow up     Oncologic Hx:    Diagnosis:   Stage IV NSCLC, Rt lung adenocarcinoma with metastasis to pleura, mediastinum , rt pleural effusion and brain diagnosed 1/2022 (AJCC 8th edition)  PD-L1 TPS 2-3% by [place]   NGS [facility] panel-EML4:ALK rearragement  NGS Guardant- GNAS R201H, KRAS K5E- No ALK    Treatment:   Current:  3/2/22- now- Alectinib 300 mg BID (Dose reduced to 450 mg BID from 600 mg BID due to grade 3 myalgias 3/21/22, again reduced to 300 mg BID 9/28/22)  9/28/22- Bevacizumab for radiation necrosis  )  Past:  2/15/22- GK to 11 briain lesions    Intent of treatment: Palliative    Oncologic course:  1/19/22 to 1/22/22-Admitted to [facility] for 2 week progressive SOB secondary to have large rt sided pleural effusion, needing thoracentesis x2 (1.7L and 2.0 L removed), cytology positive for malignancy, adenocarcinoma.   1/26/22- Rt pleural mass biopsy-Dr. Agrawal--POSITIVE FOR ADENOCARCINOMA CONSISTENT WITH LUNG PRIMARY, admixed with mesothelial hyperplasia and inflammatory infiltrate (+ TTF-1 and CK 7;  negative  p40, calretinin and WT-1. PAX8 immunostain focal +). 4th thoracentesis done simultaneously - 3L approx removed.   2/1/22- PET/CT-Right lower lobe central infiltrative FDG avid 8.2 x 9.6 cm mass representing a primary lung adenocarcinoma. Ipsilateral right perihilar, bilateral pretracheal, subcarinal and superior  mediastinal michele metastases. Contralateral mildly FDG avid few lung nodules are suspicious for contralateral metastasis. At least 3 intracranial metastases in the right frontal lobe, left frontal lobe and left cerebellar hemisphere. Nonspecific mild diffuse bone marrow uptake. Further evaluation with a spine MRI could be considered to rule out early marrow infiltration. This could also be seen with red marrow conversion.  2/5/22-  Brain MRI- At least 9 intracranial metastases as detailed above. The dominant lesions involving the orbital right frontal lobe, the posterior left middle frontal gyrus, anterior right temporal lobe and in the left cerebellar hemisphere have surrounding moderate vasogenic type edema.  2/15/22- Saw Dr. Arango from Rad Onc- Rcd GK to 12 lesion in bran  2/16/22- Pleurex placement   3/2/22- Started Alectinib 600 mg BID  3/21/22- Dose reduced to 450 mg BID due to grade 3 myalgias and fatigue  4/2/22 to 4/5/22- Admitted at [facility] for- Severe sepsis due to MSSA infection of right PleurX catheter s/p removal- He presented with onset of pain at tube site starting 4/1; at arrival was tachycardic with leukocytosis (22.7) and elevated lactic acid (2.9).  CT chest showed fluid and stranding tracking outside the pleural space into chest wall along pleural catheter.  IR was consulted and removed catheter 4/2 with report of pustular drainage and tip culture growing MSSA.  Thoracic Surg was consulted who felt no surgical indication necessary given minimal pleural fluid and lack of any signs of abscess.  Initially treated with broad spectrum coverage for sepsis, narrowed to Ancef once sensitivities returned with plan to transition to cefadroxil for an additional 10 days at discharge per ID. Held drug 4/2 to 4/11 5/2/22- CT CAP- Overall, positive response to therapy with decreased size of right lower lobe and right pleural-based masses, pulmonary metastases, hilar and mediastinal lymphadenopathy.  However, a single right posterior pleural-based mass has slightly increased in size since 2/24/2022. No metastatic disease in the abdomen and pelvis. Right Pleurx catheter has been removed. Trace right pleural effusion and right basilar atelectasis.  5/2/22- Brain MRI- The previously demonstrated brain metastases are mildly diminished in size versus to 2/5/2022. The degree of edema is also diminished but not completely resolved. Probable trace amounts of intralesional bleeding demonstrated on the gradient sequence within the metastases. No definite new metastasis or progressive mass effect. No hydrocephalus or infarct.    6/15/22 to 6/17/22- Admitted at [facility]-with aphasia and word finding difficulty over last few weeks.  He presented to [facility] ED on 6/10 for evaluation of his symptoms. MRI brain showed multiple intracranial metastases, with interval enlargement of the dominant lesion within the left frontal lobe and increased surrounding vasogenic edema with 2 mm rightward shift of the septum pellucidum. Due to his worsening anxiety, he left AMA. His symptoms continued to progress to where he could not write at work so he decided to go to the ED for re-evaluation and treatment. Evaluated by NSGY, Rad Onc (radiaiton necrosis vs tumor progression).  6/16/22- MR Brain (6/16) shows multiple intracranial metastases, with interval enlargement  of the dominant lesion within the left frontal lobe and increased surrounding vasogenic edema with 2 mm rightward shift of the septum pellucidum.  6/16/22- - CT CAP shows slightly decreased size of right lower lobe and right pleural-based masses. No new pulmonary nodules or lymphadenopathy; No evidence of metastatic disease in the abdomen or pelvis.   6/28/22 to 6/30/22- Admitted at [facility]- Elective left Stealth craniotomy with resection of brain tumor due to ongoing symptoms. No intraoperative complications. EBL 50 ml.  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  7/5/22-  Ct CAP- Right lower lobe low-density nodules are not significantly changed. A small left upper lobe pulmonary nodule is also unchanged. Trace pleural fluid on the right has increased slightly. No convincing evidence for metastatic disease in the abdomen or pelvis.  7/18/22 to 7/19/22- Admitted to [facility] for seizure- Reportedly was only taking once Keppra instead of twice daily. Also resumed on dexamethasone 2 mg daily  8/1/22- Brain MRI- Redemonstrated postsurgical changes status post left frontoparietal Craniotomy. Interval increase in size of the dominant ring-enhancing lesion in the left posterior superior frontal lobe with increased moderate surrounding vasogenic edema and local mass effect resulting in narrowing of the supratentorial ventricular system. No significant midline shift/herniation at this time  8/1/22- Dex was increased to 4 mg daily by Dr. Moran  9/1/22- CT Chest- Near resolution of previously seen right pleural nodule. Stable right lower lobe pulmonary nodule  9/28/22- Bevacizumab for radiation necrosis  10/26/22- Bevacizumab   10/26/22- Ct CAP- Stable posterior medial right lower lobe 1.9 x 1.1 cm nodule series 8 image 176. Adjacent stable scarring and atelectasis. The previously noted pleural nodule posteriorly on the right is not currently clearly identified. Stable left upper lobe 0.3 cm nodule image 56  10/26/22- Brain MRI- Overall improved appearance of multiple intracranial metastases with near resolution of associated edema and diminished enhancement and size of multiple residual lesions. No definite new or progressive metastasis.    He works as a maintenance manger for apartment complexes    Interval Hx:  Connor is here with his significant other after missing 2 last appointments with oncology.  Last dose of bevacizumab was on 10/26.  For the last 2 weeks he has been feeling terrible.  He has been spending most of the time in the bed and not going to work.  He typically works 40 hours/week  but for the last 2 weeks has been missing 2 or 3 days in a week.  He says he is feels fatigued and has noticed new onset dull headaches in the vertex of the head,  intermittent, no aggravating or reliving factors, not associated in any focal neurological deficits.  No new vision problems.  He is taking his antihypertensives lisinopril and HCTZ regularly and says that he has not missed any doses.  He has also noticed that he is having new right sided pleuritic chest pain on the lateral side that goes down a little bit, this is also new.  He has been tapered off of dexamethasone since 10/31.  He is currently taking alectinib 300 mg twice daily as prescribed.  He continues to have on and off body aches but overall stable, he has been off of his statin as well.  Breathing is stable, no cough, no fever or chills.  He has been mostly in bed from the last Wednesday  He has not been eating well for the past but for the last week or so  He has been doing  Tylenol, ibuprofen and ice packs with minimal  He has been sleeping in the bed most of the time, last Wednesday  He also noticed intemittent nausea/vomiitng, had 2 epsidoes of vomiting  Currently on 300 mg BID of alectinib      REVIEW OF SYSTEMS: 14 point ROS negative other than the symptoms noted above in the HPI.    Wt Readings from Last 4 Encounters:   10/26/22 98.3 kg (216 lb 11.2 oz)   10/18/22 99.1 kg (218 lb 6.4 oz)   10/03/22 99.2 kg (218 lb 9.6 oz)   09/28/22 98.1 kg (216 lb 4.8 oz)      Review of Systems:  A comprehensive ROS was performed and found to be negative or non-contributory with the exception of that noted in the HPI above.    Past Medical History:  GERD  Hypertension, not on medication  Type 2 diabetes mellitus, not on medications currently, previously on Metformin    Past Surgical History:  Past Surgical History:   Procedure Laterality Date     BRONCHOSCOPY RIGID OR FLEXIBLE W/TRANSENDOSCOPIC ENDOBRONCHIAL ULTRASOUND GUIDED Bilateral 1/26/2022     Procedure: Right BRONCHOSCOPY, FIBEROPTIC, endobronchial ultrasound, pleural biopsy;  Surgeon: Dallin Agrawal MD;  Location: [facility] OR     INJECT BLOCK MEDIAL BRANCH CERVICAL/THORACIC/LUMBAR       INSERT CHEST TUBE Right 2022    Procedure: INSERTION, CATHETER, INTERCOSTAL, FOR DRAINAGE;  Surgeon: Dallin Agrawal MD;  Location: [facility] GI     INSERT CHEST TUBE Right 3/9/2022    Procedure: INSERTION, CATHETER, INTERCOSTAL, FOR DRAINAGE;  Surgeon: Sushila Antonio MD;  Location: [facility] GI     IR CHEST TUBE REMOVAL TUNNELED RIGHT  2022     OPTICAL TRACKING SYSTEM CRANIOTOMY, EXCISE TUMOR, COMBINED Left 2022    Procedure: Left stealth craniotomy for tumor resection with motor mapping;  Surgeon: Stephen Moran MD;  Location:  OR     ORTHOPEDIC SURGERY      Ganesh. Rotator cuff repair.     PLEUROSCOPY N/A 2022    Procedure: Pleuroscopy with Pleural Biopsy;  Surgeon: Dallin Agrawal MD;  Location:  OR       Social History:  Lives with wife and 4 kids in [place]. Works as a  for an [email or web address] complex in [place]. Exposure to household chemicals and . No significant exposure to asbestos. No signal exposure to benzene or similar chemicals. No significant smoking history-states that he smoked 1 to 2 cigarettes occasionally per month for about 2 years in college, non-smoking since then. No significant alcohol use history. No other recreational substances. Good support system. Kids are 23, 19, 17 and 13.    Family History  Significant history for cancers on maternal side. Mother  of uterine cancer. 2 maternal uncles have possible metastatic melanoma.    Outpatient Medications:  Current Outpatient Medications   Medication     alcohol swab prep pads     alectinib (ALECENSA) 150 MG CAPS     blood glucose (NO BRAND SPECIFIED) test strip     blood glucose calibration (NO BRAND SPECIFIED) solution     blood glucose monitoring (NO BRAND SPECIFIED) meter device kit     citalopram  (CELEXA) 20 MG tablet     Continuous Blood Gluc  (FREESTYLE IRENE 2 READER) YVES     Continuous Blood Gluc Sensor (FREESTYLE IRENE 2 SENSOR) MISC     dexamethasone (DECADRON) 4 MG tablet     hydrochlorothiazide (HYDRODIURIL) 25 MG tablet     insulin aspart (NOVOLOG PEN) 100 UNIT/ML pen     insulin glargine (LANTUS PEN) 100 UNIT/ML pen     insulin pen needle (31G X 8 MM) 31G X 8 MM miscellaneous     levETIRAcetam (KEPPRA) 1000 MG tablet     lisinopril (ZESTRIL) 40 MG tablet     metFORMIN (GLUCOPHAGE XR) 500 MG 24 hr tablet     oxyCODONE (ROXICODONE) 5 MG tablet     prochlorperazine (COMPAZINE) 10 MG tablet     thin (NO BRAND SPECIFIED) lancets     No current facility-administered medications for this visit.       Physical Exam:    Blood pressure (!) 161/116, pulse 103, temperature 98.1  F (36.7  C), temperature source Oral, resp. rate 16, weight 99.1 kg (218 lb 8 oz), SpO2 99 %.   General: alert and cooperative, sitting up in chair  HEENT: sclera anicteric, EOMI, MMM. Pupils equal and reactive.   Resp: breathing comfortably   GI: soft, non-tender, non-distended, bowel sounds present and normoactive  MSK: warm and well-perfused, normal tone  Skin: no rashes on limited exam, no jaundice  Neuro: Alert awake and oriented x4, strength is 5 on 5 throughout, sensations are grossly intact    Labs & Studies: I personally reviewed the following studies:  Most Recent 3 CBC's:  Recent Labs   Lab Test 10/18/22  0833 09/13/22  0852 09/12/22  1642   WBC 13.7* 11.1* 13.8*   HGB 14.4 14.3 14.4   MCV 94 97 94    248 252     Most Recent 3 BMP's:  Recent Labs   Lab Test 10/26/22  0832 10/18/22  0833 09/28/22  1116 09/13/22  0852 09/12/22  1642   NA  --  144  --  140 140   POTASSIUM 3.3* 3.2* 4.2 4.2 3.6   CHLORIDE  --  102  --  103 100   CO2  --  27  --  31 28   BUN  --  22.1*  --  15 20.3*   CR  --  0.51*  --  0.58* 0.66*   ANIONGAP  --  15  --  6 12   TORY  --  9.0  --  9.1 9.7   GLC  --  151*  --  423* 374*    Most  Recent 2 LFT's:  Recent Labs   Lab Test 10/18/22  0833 09/13/22  0852   AST 16 11   ALT 19 36   ALKPHOS 76 109   BILITOTAL 0.6 0.9    Most Recent TSH and T4:  Recent Labs   Lab Test 09/12/22  1642   TSH 2.56        ASSESSMENT AND PLAN:  Stage IV NSCLC, Rt lung adenocarcinoma with metastasis to pleura, mediastinum , rt pleural effusion and brain diagnosed 1/2022 (AJCC 8th edition)  PD-L1 TPS 2-3% by Doyle   NGS [facility] panel-EML4:ALK rearragement; chr2:[id number], chr2:[id number]  NGS Guardant- GNAS R201H, KRAS K5E- No ALK    He began Alectinib 600 mg BID 3/2/22 and unfortunately developed grade 3 myalgias which have improved with lowering the dose 450 mg BID. He was holding drug 4/2 to 4/11 due to MSSA infection from pleurex which is removed. Resumed at 450 mg BID. Initial CT with good OK in the chest. Had pleuretic right sided chest pain so CT chest on 9/1 which shows ongoing OK in chest. Due to ongoing myalgias (Although CK is normal), we dose reduced to 300 mg BID, will Continue alectinib 300 mg BID.  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    Howver, today has symptoms concerning for SE from bevacizumab.    Plan  Will continue alectinib 300 mg twice daily  Next CT in approximately 2 months  -=-We will do basic labs today, including CBC, CMP, troponin, CK and D-dimer, EKG (for work up below)  -ER referral (see below)      # Hypertensive urgency/emergency  -His blood pressure today is about 165/116.  This is despite taking his antihypertensive medications for today.  Due to his ongoing headaches, and really worried about posterior reversible encephalopathy syndrome(PRES), that is an rare but known side effect of bevacizumab.  We will try to image his brain with a CT of the head stat and send him to the ER for close monitoring and control of his blood pressure.  He might need intravenous antihypertensives  Discussed  this with pt   -- ER referral    # New pleuritic chest pain  -Has new right-sided pleuritic chest pain, has previous history of cancer in the outside.  Due to ongoing tachycardiam, concern for VTE secondary to bevacizumab as well.  We will draw labs today and get CT of the chest stat to rule out a PE.  -- ER referral       # Brain mets:  # Radiation necrosis  Baseline Brain MRI with several brain mets, s/p GK to 11-12 brain mets. F/u Brain MRI in June was showing enlargement of the one of the lesions along with edema, therefore had to undergo craniotomy followed by resection, the final biopsy consistent with radiation necrosis.  Had issues with radiation necrosis and swelling requiring steroids but these were driving up blood sugars drastically  -Began bevacizumab for radiation necrosis --15 mg/kg every 3 weeks, plan for at least 3 months of therapy.  Dexamethasone taper through 10/31. S/p 2 doses of Bevacixumab now, last one 10/26   -Recent Brain MRI showing Rx effect and resolution of radiation necrosis  Next Brain MRI 1/2023    # Type 2 Diabetes: Self monitoring of blood glucose is not at goal of fasting  mg/dL. Now off of dexamethasone. Started metformin 500mg ER every day    --Start using [employer] 2 continous glucose monitor  -- Metformin ER 500mg once weekly with food and if tolerating with no nausea or diarrhea increase dose week 2 to metformin ER 1000mg (2 tablets) once daily with food; week 3 increase metformin ER 1500mg (3 tablets) once daily with food  -- Novolog scale with correction--be sure to check blood sugars before eating and taking insulin with a meal        # G3 diastolic HTN - led to one dose hold of bevacizumab   -Monitor BP at home daily. On  lisinopril to 40 mg, and hydrochlorothiazide, will cosndier adding amlodipine  -see above     #grade 3 myalgias: CK has been normal. initially resolved after lowering dose though Connor recalls it has never really improved. ?unclear etiology?  Monitor  with reduced 300 mg BID dose  -off of rosuvastatin now    #hypokalemia: mild and he denies PO intake limitations thus will recheck with next weeks labs that are already scheduled    #normocytic anemia: sudden drop to <7, requiring 1 unit blood transfusion. Lab work up unrevealing. Has recovered to baseline  -consider EGD if hgb drops again, risk for GI bleed with chronic steroid use      #grade 2 fatigue: ongoing. Monitor any improvement with dose reduction     #MSSA infection, Sepsis at pleurex site- resolved  # Pleural effusion: s/p pleurex palcement  2/16/22. Then on 4/2 right PleurX catheter s/p removal for severe sepsis due to MSSA infection.    #dry eyes  #blurred vision  Continue artificial tears. Saw ophthalmology previosuly, changed prescription, mow improved.  No neuro sx.     #Psychiatry  # Situational Anxiety   - irritable at times, may be better off steroids. Declined referral to therapist and denies medication intervention for now. PCP can help manage this too if he has established relationship with him    #COVID vaccine: No COVID vaccine on record, did not discuss today.       On the day of service  Chart review: 5 minutes  Visit duration: 50 minutes  Care coordination: 5 minutes        Again, thank you for allowing me to participate in the care of your patient.      Sincerely,    Bassam Persaud MD

## 2022-11-08 ENCOUNTER — APPOINTMENT (OUTPATIENT)
Dept: ULTRASOUND IMAGING | Facility: CLINIC | Age: 42
End: 2022-11-08
Payer: COMMERCIAL

## 2022-11-08 ENCOUNTER — APPOINTMENT (OUTPATIENT)
Dept: MRI IMAGING | Facility: CLINIC | Age: 42
End: 2022-11-08
Attending: EMERGENCY MEDICINE
Payer: COMMERCIAL

## 2022-11-08 LAB
ANION GAP SERPL CALCULATED.3IONS-SCNC: 14 MMOL/L (ref 7–15)
ATRIAL RATE - MUSE: 78 BPM
BUN SERPL-MCNC: 15.1 MG/DL (ref 6–20)
CALCIUM SERPL-MCNC: 8.4 MG/DL (ref 8.6–10)
CHLORIDE SERPL-SCNC: 103 MMOL/L (ref 98–107)
CREAT SERPL-MCNC: 0.57 MG/DL (ref 0.67–1.17)
DEPRECATED HCO3 PLAS-SCNC: 23 MMOL/L (ref 22–29)
DIASTOLIC BLOOD PRESSURE - MUSE: NORMAL MMHG
ERYTHROCYTE [DISTWIDTH] IN BLOOD BY AUTOMATED COUNT: 13.8 % (ref 10–15)
GFR SERPL CREATININE-BSD FRML MDRD: >90 ML/MIN/1.73M2
GLUCOSE BLDC GLUCOMTR-MCNC: 135 MG/DL (ref 70–99)
GLUCOSE BLDC GLUCOMTR-MCNC: 135 MG/DL (ref 70–99)
GLUCOSE BLDC GLUCOMTR-MCNC: 151 MG/DL (ref 70–99)
GLUCOSE BLDC GLUCOMTR-MCNC: 155 MG/DL (ref 70–99)
GLUCOSE BLDC GLUCOMTR-MCNC: 164 MG/DL (ref 70–99)
GLUCOSE SERPL-MCNC: 147 MG/DL (ref 70–99)
HBA1C MFR BLD: 9.5 %
HCT VFR BLD AUTO: 39.7 % (ref 40–53)
HGB BLD-MCNC: 13.6 G/DL (ref 13.3–17.7)
HOLD SPECIMEN: NORMAL
INTERPRETATION ECG - MUSE: NORMAL
MCH RBC QN AUTO: 32.9 PG (ref 26.5–33)
MCHC RBC AUTO-ENTMCNC: 34.3 G/DL (ref 31.5–36.5)
MCV RBC AUTO: 96 FL (ref 78–100)
P AXIS - MUSE: 31 DEGREES
PLATELET # BLD AUTO: 170 10E3/UL (ref 150–450)
POTASSIUM SERPL-SCNC: 3.2 MMOL/L (ref 3.4–5.3)
POTASSIUM SERPL-SCNC: 3.5 MMOL/L (ref 3.4–5.3)
PR INTERVAL - MUSE: 146 MS
QRS DURATION - MUSE: 84 MS
QT - MUSE: 414 MS
QTC - MUSE: 471 MS
R AXIS - MUSE: 22 DEGREES
RBC # BLD AUTO: 4.13 10E6/UL (ref 4.4–5.9)
SARS-COV-2 RNA RESP QL NAA+PROBE: NEGATIVE
SODIUM SERPL-SCNC: 140 MMOL/L (ref 136–145)
SYSTOLIC BLOOD PRESSURE - MUSE: NORMAL MMHG
T AXIS - MUSE: 41 DEGREES
UFH PPP CHRO-ACNC: 0.7 IU/ML
UFH PPP CHRO-ACNC: >1.1 IU/ML
UFH PPP CHRO-ACNC: >1.1 IU/ML
VENTRICULAR RATE- MUSE: 78 BPM
WBC # BLD AUTO: 8.4 10E3/UL (ref 4–11)

## 2022-11-08 PROCEDURE — 36415 COLL VENOUS BLD VENIPUNCTURE: CPT

## 2022-11-08 PROCEDURE — 85520 HEPARIN ASSAY: CPT | Performed by: INTERNAL MEDICINE

## 2022-11-08 PROCEDURE — 70553 MRI BRAIN STEM W/O & W/DYE: CPT | Mod: 26 | Performed by: RADIOLOGY

## 2022-11-08 PROCEDURE — 99222 1ST HOSP IP/OBS MODERATE 55: CPT | Mod: GC | Performed by: PSYCHIATRY & NEUROLOGY

## 2022-11-08 PROCEDURE — 250N000013 HC RX MED GY IP 250 OP 250 PS 637

## 2022-11-08 PROCEDURE — U0003 INFECTIOUS AGENT DETECTION BY NUCLEIC ACID (DNA OR RNA); SEVERE ACUTE RESPIRATORY SYNDROME CORONAVIRUS 2 (SARS-COV-2) (CORONAVIRUS DISEASE [COVID-19]), AMPLIFIED PROBE TECHNIQUE, MAKING USE OF HIGH THROUGHPUT TECHNOLOGIES AS DESCRIBED BY CMS-2020-01-R: HCPCS | Performed by: NURSE PRACTITIONER

## 2022-11-08 PROCEDURE — 250N000011 HC RX IP 250 OP 636

## 2022-11-08 PROCEDURE — 70553 MRI BRAIN STEM W/O & W/DYE: CPT

## 2022-11-08 PROCEDURE — 93970 EXTREMITY STUDY: CPT | Mod: 26 | Performed by: RADIOLOGY

## 2022-11-08 PROCEDURE — 36415 COLL VENOUS BLD VENIPUNCTURE: CPT | Performed by: EMERGENCY MEDICINE

## 2022-11-08 PROCEDURE — 99223 1ST HOSP IP/OBS HIGH 75: CPT | Mod: GC | Performed by: INTERNAL MEDICINE

## 2022-11-08 PROCEDURE — 250N000011 HC RX IP 250 OP 636: Performed by: EMERGENCY MEDICINE

## 2022-11-08 PROCEDURE — A9585 GADOBUTROL INJECTION: HCPCS | Performed by: INTERNAL MEDICINE

## 2022-11-08 PROCEDURE — 99232 SBSQ HOSP IP/OBS MODERATE 35: CPT | Mod: GC | Performed by: INTERNAL MEDICINE

## 2022-11-08 PROCEDURE — 85520 HEPARIN ASSAY: CPT | Performed by: EMERGENCY MEDICINE

## 2022-11-08 PROCEDURE — 84132 ASSAY OF SERUM POTASSIUM: CPT

## 2022-11-08 PROCEDURE — 250N000011 HC RX IP 250 OP 636: Performed by: STUDENT IN AN ORGANIZED HEALTH CARE EDUCATION/TRAINING PROGRAM

## 2022-11-08 PROCEDURE — 93970 EXTREMITY STUDY: CPT

## 2022-11-08 PROCEDURE — 85027 COMPLETE CBC AUTOMATED: CPT

## 2022-11-08 PROCEDURE — 80048 BASIC METABOLIC PNL TOTAL CA: CPT

## 2022-11-08 PROCEDURE — 255N000002 HC RX 255 OP 636: Performed by: INTERNAL MEDICINE

## 2022-11-08 PROCEDURE — 83036 HEMOGLOBIN GLYCOSYLATED A1C: CPT

## 2022-11-08 PROCEDURE — 120N000002 HC R&B MED SURG/OB UMMC

## 2022-11-08 RX ORDER — POTASSIUM CHLORIDE 750 MG/1
40 TABLET, EXTENDED RELEASE ORAL ONCE
Status: COMPLETED | OUTPATIENT
Start: 2022-11-08 | End: 2022-11-08

## 2022-11-08 RX ORDER — LABETALOL HYDROCHLORIDE 5 MG/ML
10 INJECTION, SOLUTION INTRAVENOUS EVERY 4 HOURS PRN
Status: DISCONTINUED | OUTPATIENT
Start: 2022-11-08 | End: 2022-11-09 | Stop reason: HOSPADM

## 2022-11-08 RX ORDER — ENOXAPARIN SODIUM 100 MG/ML
1 INJECTION SUBCUTANEOUS EVERY 12 HOURS
Status: DISCONTINUED | OUTPATIENT
Start: 2022-11-08 | End: 2022-11-09 | Stop reason: HOSPADM

## 2022-11-08 RX ORDER — KETOROLAC TROMETHAMINE 30 MG/ML
30 INJECTION, SOLUTION INTRAMUSCULAR; INTRAVENOUS EVERY 6 HOURS PRN
Status: DISCONTINUED | OUTPATIENT
Start: 2022-11-08 | End: 2022-11-08

## 2022-11-08 RX ORDER — HYDRALAZINE HYDROCHLORIDE 20 MG/ML
10 INJECTION INTRAMUSCULAR; INTRAVENOUS EVERY 6 HOURS PRN
Status: DISCONTINUED | OUTPATIENT
Start: 2022-11-08 | End: 2022-11-08

## 2022-11-08 RX ORDER — GADOBUTROL 604.72 MG/ML
10 INJECTION INTRAVENOUS ONCE
Status: COMPLETED | OUTPATIENT
Start: 2022-11-08 | End: 2022-11-08

## 2022-11-08 RX ORDER — OLANZAPINE 2.5 MG/1
2.5 TABLET, FILM COATED ORAL AT BEDTIME
Status: DISCONTINUED | OUTPATIENT
Start: 2022-11-08 | End: 2022-11-08

## 2022-11-08 RX ORDER — HYDRALAZINE HYDROCHLORIDE 20 MG/ML
10 INJECTION INTRAMUSCULAR; INTRAVENOUS EVERY 4 HOURS PRN
Status: DISCONTINUED | OUTPATIENT
Start: 2022-11-08 | End: 2022-11-09 | Stop reason: HOSPADM

## 2022-11-08 RX ORDER — OLANZAPINE 2.5 MG/1
2.5 TABLET, FILM COATED ORAL
Status: DISCONTINUED | OUTPATIENT
Start: 2022-11-08 | End: 2022-11-09 | Stop reason: HOSPADM

## 2022-11-08 RX ORDER — LEVETIRACETAM 500 MG/1
1000 TABLET ORAL 2 TIMES DAILY
Status: DISCONTINUED | OUTPATIENT
Start: 2022-11-08 | End: 2022-11-09 | Stop reason: HOSPADM

## 2022-11-08 RX ORDER — PROPRANOLOL HYDROCHLORIDE 10 MG/1
20 TABLET ORAL 2 TIMES DAILY PRN
Status: DISCONTINUED | OUTPATIENT
Start: 2022-11-08 | End: 2022-11-09 | Stop reason: HOSPADM

## 2022-11-08 RX ORDER — LABETALOL HYDROCHLORIDE 5 MG/ML
10 INJECTION, SOLUTION INTRAVENOUS EVERY 4 HOURS
Status: DISCONTINUED | OUTPATIENT
Start: 2022-11-08 | End: 2022-11-08

## 2022-11-08 RX ADMIN — HEPARIN SODIUM 1500 UNITS/HR: 10000 INJECTION, SOLUTION INTRAVENOUS at 08:09

## 2022-11-08 RX ADMIN — LABETALOL HYDROCHLORIDE 10 MG: 5 INJECTION, SOLUTION INTRAVENOUS at 06:59

## 2022-11-08 RX ADMIN — HYDROCHLOROTHIAZIDE 25 MG: 25 TABLET ORAL at 08:14

## 2022-11-08 RX ADMIN — INSULIN ASPART 1 UNITS: 100 INJECTION, SOLUTION INTRAVENOUS; SUBCUTANEOUS at 08:13

## 2022-11-08 RX ADMIN — GADOBUTROL 10 ML: 604.72 INJECTION INTRAVENOUS at 12:21

## 2022-11-08 RX ADMIN — PROPRANOLOL HYDROCHLORIDE 20 MG: 10 TABLET ORAL at 18:50

## 2022-11-08 RX ADMIN — LISINOPRIL 40 MG: 40 TABLET ORAL at 08:14

## 2022-11-08 RX ADMIN — LABETALOL HYDROCHLORIDE 10 MG: 5 INJECTION, SOLUTION INTRAVENOUS at 20:31

## 2022-11-08 RX ADMIN — LEVETIRACETAM 1000 MG: 500 TABLET, FILM COATED ORAL at 20:27

## 2022-11-08 RX ADMIN — LABETALOL HYDROCHLORIDE 10 MG: 5 INJECTION, SOLUTION INTRAVENOUS at 14:19

## 2022-11-08 RX ADMIN — ENOXAPARIN SODIUM 100 MG: 100 INJECTION SUBCUTANEOUS at 20:26

## 2022-11-08 RX ADMIN — POTASSIUM CHLORIDE 40 MEQ: 750 TABLET, EXTENDED RELEASE ORAL at 10:09

## 2022-11-08 RX ADMIN — HYDRALAZINE HYDROCHLORIDE 10 MG: 20 INJECTION INTRAMUSCULAR; INTRAVENOUS at 16:38

## 2022-11-08 ASSESSMENT — ACTIVITIES OF DAILY LIVING (ADL)
ADLS_ACUITY_SCORE: 35
ADLS_ACUITY_SCORE: 18
ADLS_ACUITY_SCORE: 35
ADLS_ACUITY_SCORE: 18
ADLS_ACUITY_SCORE: 35
ADLS_ACUITY_SCORE: 18
ADLS_ACUITY_SCORE: 35
ADLS_ACUITY_SCORE: 35
ADLS_ACUITY_SCORE: 18
ADLS_ACUITY_SCORE: 35
ADLS_ACUITY_SCORE: 18
ADLS_ACUITY_SCORE: 35

## 2022-11-08 ASSESSMENT — ENCOUNTER SYMPTOMS
VOMITING: 1
FEVER: 0
ALLERGIC/IMMUNOLOGIC NEGATIVE: 1
MUSCULOSKELETAL NEGATIVE: 1
ENDOCRINE NEGATIVE: 1
FATIGUE: 1
SHORTNESS OF BREATH: 1
NAUSEA: 1
HEADACHES: 1
EYES NEGATIVE: 1
CHILLS: 0
HEMATOLOGIC/LYMPHATIC NEGATIVE: 1
ACTIVITY CHANGE: 1
CARDIOVASCULAR NEGATIVE: 1
PSYCHIATRIC NEGATIVE: 1

## 2022-11-08 NOTE — UTILIZATION REVIEW
Admission Status; Secondary Review Determination         Under the authority of the Utilization Management Committee, the utilization review process indicated a secondary review on the above patient.  The review outcome is based on review of the medical records, discussions with staff, and applying clinical experience noted on the date of the review.        (x)      Inpatient Status Appropriate - This patient's medical care is consistent with medical management for inpatient care and reasonable inpatient medical practice.       RATIONALE FOR DETERMINATION   The patient is a 42-year-old male with a very complex medical history with stage IV NSCLC LC, right lung adenocarcinoma with metastasis to pleura, mediastinal metastasis, right pleural effusion and brain metastasis with subsequent radiation necrosis being treated by the drug Bevacizumab.  He also has a right lower lobe pulmonary embolus thought to be related to oncology condition versus treatments, he is currently on a heparin drip.  Neurology has been consulted due to persisting headache and oncology is recommended transition from heparin to Lovenox SQ every 12 hours.  Based on acute treatment for pulmonary embolus and underlying instability with multiple ongoing acute issues, recommend maintaining inpatient status.      The severity of illness, intensity of service provided, expected LOS and risk for adverse outcome make the care complex, high risk and appropriate for hospital admission.        The information on this document is developed by the utilization review team in order for the business office to ensure compliance.  This only denotes the appropriateness of proper admission status and does not reflect the quality of care rendered.         The definitions of Inpatient Status and Observation Status used in making the determination above are those provided in the CMS Coverage Manual, Chapter 1 and Chapter 6, section 70.4.      Sincerely,     Keith  MD Hue  Physician Advisor  Utilization Review/ Case Management  [facility].

## 2022-11-08 NOTE — PROGRESS NOTES
[facility]    Progress Note - Medicine Service, JANET TEAM 4       Date of Admission:  11/7/2022    Assessment & Plan      Connor Emerson is a 42 year old male with PMH significant for malignant neoplasm of lower lobe of right lung with treatment of alectinib and bevacizumab for radiation brain necrosis,T2DM, HTN and GERD who is admitted for management of PE.     Today's changes :  - MRI no evidence of PRES findings, stable necrosis/mets areas  - US LE no DVT noted  - Appreciate Oncology reccs  - Appreciate Neuro reccs  - Off heparin @1800, Levonox 1mg/kg Q12h subcutaneous starting @ 1800  - Expected discharge tomorrow      #PE  Provoked by metastatic NSCLC vs bevacizumab. Pleuritic chest pain, N/V.  Paged Neurology post-MRI for long-term AC.  - Neuro reccs :   - Follow official brain MRI   - Discuss with oncology for consideration of switching of bevacizumab   - If unable to switch chemotherapy, can trial low dose of propranolol 20mg twice daily      - O2 with O2 sat >90%  - off heparin switched to levonox 1mg/kg q12hsc  - Discharge on apixaban PO     #HTN Urgency   Adm with /111. No evidence of end-organ damage. States to be compliant with HTN medications. Ongoing concern about posterior reversible encephalopathy syndrome(PRES), that is an rare but known side effect of bevacizumab. CT head was unremarkable. MRI brain revealed no PRES, stable necrotic/mets areas and no new enhancement. Oncology consulted see below reccs.   - goal 140/90  - pta HTCZ  - pta lisinopril   - labetalol PRN   - Hydralazine PRN    #Headache  #NSCLC with brain mets  Followed OP by Dr. Persaud. On  alectinib BID and bevacizumab every three week. 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  Per Neuro, his h/o migraines could be attributed to bevacizumab and this would align when he started  the medication.     For headaches :   Neuro reccs :   - Follow official brain MRI   - Discuss with oncology for consideration of switching of bevacizumab   - If unable to switch chemotherapy, can trial low dose of propranolol 20mg twice daily      -pta alectinib BID per oncology  -Oncology reccs 11/8 :    - Recommend switching from Heparin to Lovenox SC   - Initiate Olanzapine 2.5mg qPM PO to help with apetite.   - Recommend stopping Avastin at this time given increase risk of clot/bleeding with this medication.    - Pt has follow up apt on 11/16/22   - Pt should continue alectinib 300mg BID PO    #Generalized weakness  PT/OT consult for assessment      #T2DM  Off steroids since 10/31. Adm glu 198 , 6/22 A1C 12.3  Home regimen metformin, glargine 35 U and novolog scale with correction.  -MDSS  -hold pta metformin  -A1C  -hypoglycemia protocol     Chronic     #Myalgia  H/o of statin induced myalgia. Has been off rosuvastatin with improved sx     #Psychiatry   #Situational anxiety  # H/o seizures  - denied taking citalopram  - PTA Keppra daily     Diet:  Regular   DVT Prophylaxis: heparin gtt for PE  Heart Catheter: Not present  Fluids: NA  Central Lines: None  Cardiac Monitoring: None  Code Status:  Full Code  Disposition Plan      Expected Discharge Date: 11/08/2022                The patient's care was discussed with the Attending Physician, Dr. Dominguez and Patient.    Tom Ware MD  Medicine Service, [facility] TEAM [address] [place] [address]  Securely message with the Vocera Web Console (learn more here)  Text page via [facility] Paging/Directory   Please see signed in provider for up to date coverage information      Clinically Significant Risk Factors Present on Admission        # Hypokalemia: Lowest K = 3.2 mmol/L (Ref range: 3.4-5.3) in last 2 days, will replace as needed   # Hypocalcemia: Lowest Ca = 8.4 mg/dL (Ref range: 8.5 - 10.1 mg/dL) in last 2 days, will monitor and replace  as appropriate         # Hypertension: home medication list includes antihypertensive(s)    # DMII: A1C = 9.5 % (Ref range: <5.7 %) within past 3 months            ______________________________________________________________________    Interval History   Pt seen at bedside in am rounds. Currently in no acute distress stating that his R sided pleuritic and headache have regressed with sleep. However, pt reports a h/o intermittent dull aching headache coming his craniotomy site preventing him of going to work. He mentioned headaches have started to get worst last Wednesday and are different in nature, severity and frequency have increased, which they last for few hours and resolve spontaneoulsy. No improvement w/ Tylenol/NSAIDS and icepack. Pt otherwise denies numbness, weakness and gait disturbances.       Data reviewed today: I reviewed all medications, new labs and imaging results over the last 24 hours. I personally reviewed the head CT image(s) showing No intracranial pathology, the brain MRI image(s) showing No evidence of PRES, stable post-op radiation/mets area and the B/L LE US image(s) showing No DVT.    Physical Exam   Vital Signs: Temp: 98.2  F (36.8  C) Temp src: Oral BP: (!) 146/103 Pulse: 77   Resp: 16 SpO2: 95 % O2 Device: None (Room air)    Weight: 0 lbs 0 oz  General Appearance: Sitting in bed, NAD  Respiratory: non-labored on RA  Cardiovascular: RRR, extremities warm, no edema  GI: S/NT/ND  Skin: No rash or lesions noted      Data   Recent Labs   Lab 11/08/22  0813 11/08/22  0618 11/08/22  0145 11/07/22  1531   WBC  --  8.4  --  11.4*   HGB  --  13.6  --  14.3   MCV  --  96  --  92   PLT  --  170  --  195   NA  --  140  --  143   POTASSIUM  --  3.2*  --  3.6   CHLORIDE  --  103  --  104   CO2  --  23  --  28   BUN  --  15.1  --  16.7   CR  --  0.57*  --  0.68   ANIONGAP  --  14  --  11   TORY  --  8.4*  --  9.6   * 147* 151* 192*   ALBUMIN  --   --   --  4.1   PROTTOTAL  --   --   --  6.6    BILITOTAL  --   --   --  1.0   ALKPHOS  --   --   --  79   ALT  --   --   --  23   AST  --   --   --  20     Recent Results (from the past 24 hour(s))   CT Head w/o Contrast    Narrative    EXAM: CT HEAD W/O CONTRAST  11/7/2022 4:33 PM     HISTORY:  Headache; Neurologic deficit, non-traumatic; None of the  following: Altered mental status, language deficit, sensory loss,  visual symptoms, or weakness; Nonintractable headache, unspecified  chronicity pattern, unspecified headache type       COMPARISON:  MR 10/26/2022 and 6/29/2022 and CT head 7/18/2022.    TECHNIQUE: Using multidetector thin collimation helical acquisition  technique, axial, coronal and sagittal CT images from the skull base  to the vertex were obtained without intravenous contrast.   (topogram) image(s) also obtained and reviewed.    FINDINGS:  No acute intracranial hemorrhage, mass effect, or midline shift. No  acute loss of gray-white matter differentiation in the cerebral  hemispheres. Ventricles are proportionate to the cerebral sulci  without acute hydrocephalus. Clear basal cisterns. Mild  periventricular low attenuation consistent with likely changes of  chronic small vessel ischemic disease. Post surgical changes of prior  left frontal craniotomy with underlying frontal hypodensity  correlating with known metastatic lesion much better appreciated on MR  brain 10/26/2022 and 6/29/2022. Interval decrease in the right frontal  hypodensity measuring up to approximately 1.4 cm (previously  approximately 2.0 cm), correlating with known restricting MRI  metastatic lesion. Additional smaller metastatic lesions not well  appreciated on CT evaluation, better seen on MRI 10/26/2022.    The bony calvaria and the bones of the skull base are normal aside  from postoperative changes as mentioned above. The visualized portions  of the paranasal sinuses and mastoid air cells are clear. Grossly  normal orbits.       Impression    IMPRESSION:  1. No  acute/interval intracranial pathology.  2. Chronic/known metastatic sequela better evaluated on MR 10/26/2022  with postoperative changes of prior left frontal craniotomy.    I have personally reviewed the examination and initial interpretation  and I agree with the findings.    AMELIA MARTINES MD         SYSTEM ID:  [id number]   CT Chest Pulmonary Embolism w Contrast   Result Value    Radiologist flags Pulmonary embolism (Urgent)    Narrative    EXAMINATION: CTA pulmonary angiogram, 11/7/2022 4:41 PM     COMPARISON: Chest CT 10/26/2022    HISTORY: Chest pain, unspecified type; Male sex; No imaging to r/o PE  in the last 24 hours; Pulmonary Embolism Rule-Out Criteria (PERC)     TECHNIQUE: Volumetric helical acquisition of CT images of the chest  from the lung apices to the kidneys were acquired after the  administration of 71 mL of Isovue-370 IV contrast. Post-processed  multiplanar and/or MIP reformations were obtained, archived to PACS  and used in interpretation of this study.     FINDINGS:      Contrast bolus is: adequate.  Exam is positive for acute pulmonary  embolism. Small filling defect in the right lower lobe pulmonary  artery (series 7, image 122). Filling defect is slightly eccentric,  which may represent more chronic thrombus.    The largest right ventricle transaxial diameter is (measured from  endocardium to endocardium): 4.1 cm   The largest left ventricle transaxial diameter is (measured from  endocardium to endocardium): 4.8 cm  RV/LV ratio is: 0.85 (if ratio greater than 1.1 then sign is  suspicious for right heart strain)  Reflux of contrast into the IVC? no  Paradoxical bowing of the interventricular septum to the left? no    Lungs: The central tracheobronchial tree is patent. Mild mosaic  attenuation in the upper lobes, likely due to images obtained during  expiration. No pneumothorax. Linear atelectasis in the right middle  and lower lobes. Stable 1.9 x 1.5 cm nodule in the medial right  "lower  lobe (series 8, image 161). Stable 3 mm solid pulmonary nodule in the  left upper lobe (series 8, image 60). Trace right pleural effusion,  similar to prior.    Mediastinum: The visualized thyroid gland is unremarkable. The heart  is not enlarged. No significant pericardial effusion. The ascending  aorta and main pulmonary artery are normal in caliber. No mediastinal  or axillary lymphadenopathy.    Upper abdomen: Right renal cysts. Colonic diverticulosis. The spleen  is borderline enlarged, measuring 12 cm.    Bones/Soft Tissues: Degenerative changes in the visualized spine. No  acute osseous abnormalities or suspicious bony lesions.      Impression    IMPRESSION:   1. Small pulmonary embolism in the right lower lobe pulmonary artery.  The filling defect has an eccentric appearance, which may indicate  more chronic thrombus. No evidence for right heart strain.  2. Stable 1.9 x 1.5 cm nodule in the right lower lobe.      [Urgent Result: Pulmonary embolism]    Finding was identified on 11/7/2022 4:29 PM.     Dr. Bassam Persaud was contacted by Dr. Isa Marquez at 11/7/2022 4:37  PM and verbalized understanding of the urgent finding.       In the event of a positive result for acute pulmonary embolism  resulting in right heart strain, consider calling the   [facility] hospital  for \"PERT (Pulmonary Embolism Response Team)  Activation?    PERT -- Pulmonary Embolism Response Team (Multidisciplinary team  including cardiology, interventional radiology, critical care,  hematology)    I have personally reviewed the examination and initial interpretation  and I agree with the findings.    CESAR PEÑALOZA MD         SYSTEM ID:  [id number]     Medications     heparin 1,500 Units/hr (11/08/22 0809)     - MEDICATION INSTRUCTIONS -         hydrochlorothiazide  25 mg Oral Daily     insulin aspart  1-7 Units Subcutaneous TID AC     insulin aspart  1-5 Units Subcutaneous At Bedtime     lisinopril  40 mg Oral Daily     sodium " chloride (PF)  3 mL Intracatheter Q8H

## 2022-11-08 NOTE — PLAN OF CARE
Goal Outcome Evaluation:    BP (!) 158/107 (BP Location: Left arm, Cuff Size: Adult Regular)   Pulse 78   Temp 98.3  F (36.8  C) (Oral)   Resp 16   SpO2 94%     [id number]    Admitted for management of PE, on Hep gtt. A&Ox4, denied pain. Denied SOB or coughing. H/o NSCLC of R lower lobe of the lung mets to brain. 10A > 1.10, Hep gtt held for 1 hour and will restart 300 units lower than the current order. Stat brain MRI, MD okay to be done sometime in the morning. MRI checklist done and previous RN sent a copy to MRI unit.

## 2022-11-08 NOTE — CONSULTS
Oncology  Consult Note   Date of Service: 11/08/2022    Patient: Connor Emerson  MRN: [id number]  Admission Date: 11/7/2022  Hospital Day # 1  Cancer Diagnosis: Stage IV NSCLC  Primary Outpatient Oncologist: Dr. 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 Pleural, Brain s/p RT Brain (02/2022) and craniotomy 06/2022 currently on Alectinib 300mg BID and Bevacizumab for radiation necrosis (last dose 10/26/2022) who is currently hospitalized for a recently diagnosed PE.     # Stage IV NSCLC  # Right Lower Lobe Pulmonary Embolism  # Uncontrolled Hypertension  # Headache  #Generalized weakness    Patient with Stage IV NSCLC now with a provoked RLL PE in the setting of underlying malignancy and Avastin. Recommend stopping Avastin at this time given increase risk of clot/bleeding with this medication. His last dose of Avastin was 10/26/2022. Given that avastin has a half life of about 3 weeks, will be cautious with anticoagulation at this time as patient is at a high risk of bleeding. Patient is currently on Heparin drip, recommend switching to Lovenox instead of a DOAC at this time, given his increased risk of bleeding. Patient to follow up closely with oncology outpatient (Has follow up appointment on 11/16/22)  and can be switched to a DOAC when safe to do so as his kidney and liver function is normal. As far as duration for A/C goes, given underlying stage IV malignancy recommend indefinite AC. B/L LE Doppler US showed no evidence of DVT (11/8/22). Patient should continue  Alectinib 300mg BID PO.    Uncontrolled HTN. Recommend optimization of BP medications for better BP control prior to discharge, especially in the setting of headaches, brain mets  and anticoagulation.     Intermittent headaches. MRI 11/8/22 show no findings of posterior reversible encephalopathy. Stable postsurgical changes and multifocal areas of radiation necrosis and intracranial metastases. No new focus of intracranial enhancement. Neurology following. Appreciate recs.    Generalized weakness. Consider PT/OT consult for assessment and recommendations.Recommend initiating Olanzapine at a low dose of 5mg PO at nighttime to help with appetite as eating better may improve energy level.    Recommendations:   - Discontinue Avastin (Discussed with primary oncologist Dr. Persaud who agrees with this)   - Recommend switching from Heparin to Lovenox 1mg/kg/12hours SC  - Recommend starting Olanzapine 5mg qPM PO to help with appetite.  - Consider PT/OT assessment for generalized weakness.  - Patient has a scheduled close oncologic follow up with oncology on  11/16/22.      We will continue to follow this patient. Please do not hesitate to page with any questions or concerns.    Patient was seen and plan of care was discussed with attending physician Dr. Augustine.    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 Pleural, Brain s/p RT Brain (02/2022) and craniotomy 06/2022 currently on Alectinib 300mg BID and Bevacizumab for radiation necrosis (last dose 10/26/2022) who is currently hospitalized for a recently diagnosed PE. Patient presented to his regular oncology for a follow-up appointment on 11/7/22 after missing 2 prior visits and was noted to have right sided pleuritic chest pain, generalized weakness, headache, nausea and a few episodes of vomiting ongoing for about 1-2 weeks. A CTA Chest was obtained which showed right lower lobe PE with questionable chronicity, and  no right heart strain. Stable RLL 1.0 cm X 1.5 cm lesion noted. CT head w/o contrast show No acute/interval intracranial pathology with chronic/known metastatic sequela. Patient was sent to the ED for further management. In the ED patient was started on Heparin drip. He complained of worsening headache and Neurology was consulted with recommendation for Brain MRI. Oncology has been consulted for further management recommendations.    Patient states that his headache is better this AM. BP is better controlled though still not at goal. He denies any current Chest pain or SOB. SPO2 95% on room air. No leg pain or swelling he is able to ambulate but complains of generalized weakness.     Oncologic History:     Diagnosis:   Stage IV NSCLC, Rt lung adenocarcinoma with metastasis to pleura, mediastinum , rt pleural effusion and brain diagnosed 1/2022 (AJCC 8th edition)  PD-L1 TPS 2-3% by [place]   NGS [facility] panel-EML4:ALK rearragement  NGS [facility]- GNAS R201H, KRAS K5E- No ALK     Treatment:   Current:  3/2/22- now- Alectinib 300 mg BID (Dose reduced to 450 mg BID from 600 mg BID due to grade 3 myalgias 3/21/22, again reduced to 300 mg BID 9/28/22)  9/28/22- Bevacizumab for radiation necrosis  )  Past:  2/15/22- GK to 11 briain lesions     Intent of treatment: Palliative    I spent >45 minutes face-to-face and/or coordinating or discussing care plan. Over 50% of our time on the unit was spent counseling the patient and/or coordinating care      Review of Systems:  A comprehensive ROS was performed and found to be negative or non-contributory with the exception of that noted in the HPI above.    Past Medical History:  Past Medical History:   Diagnosis Date    Atypical chest pain 12/02/2013    Cancer (H)     Complication of anesthesia     Diabetes (H)     GERD (gastroesophageal reflux disease) 12/02/2013    HTN (hypertension) 05/14/2012    HTN, goal below 140/90 07/02/2013    Insomnia 02/21/2012    Mediastinal  lymphadenopathy     Migraine headache 07/02/2013    Migraine with aura, without mention of intractable migraine without mention of status migrainosus     Pneumonia        Past Surgical History:  Past Surgical History:   Procedure Laterality Date    BRONCHOSCOPY RIGID OR FLEXIBLE W/TRANSENDOSCOPIC ENDOBRONCHIAL ULTRASOUND GUIDED Bilateral 1/26/2022    Procedure: Right BRONCHOSCOPY, FIBEROPTIC, endobronchial ultrasound, pleural biopsy;  Surgeon: Dallin Agrawal MD;  Location: [facility] OR    INJECT BLOCK MEDIAL BRANCH CERVICAL/THORACIC/LUMBAR      INSERT CHEST TUBE Right 2/16/2022    Procedure: INSERTION, CATHETER, INTERCOSTAL, FOR DRAINAGE;  Surgeon: Dallin Agrawal MD;  Location: [facility] GI    INSERT CHEST TUBE Right 3/9/2022    Procedure: INSERTION, CATHETER, INTERCOSTAL, FOR DRAINAGE;  Surgeon: Sushila Antonio MD;  Location: [facility] GI    IR CHEST TUBE REMOVAL TUNNELED RIGHT  4/2/2022    OPTICAL TRACKING SYSTEM CRANIOTOMY, EXCISE TUMOR, COMBINED Left 6/28/2022    Procedure: Left stealth craniotomy for tumor resection with motor mapping;  Surgeon: Stephen Moran MD;  Location:  OR    ORTHOPEDIC SURGERY      Ganesh. Rotator cuff repair.    PLEUROSCOPY N/A 1/26/2022    Procedure: Pleuroscopy with Pleural Biopsy;  Surgeon: Dallin Agrawal MD;  Location: [facility] OR       Social History:  Social History     Socioeconomic History    Marital status:      Spouse name: Kylie    Number of children: 4   Tobacco Use    Smoking status: Never    Smokeless tobacco: Never   Vaping Use    Vaping Use: Never used   Substance and Sexual Activity    Alcohol use: Yes     Alcohol/week: 0.0 standard drinks     Comment: social    Drug use: No    Sexual activity: Yes     Partners: Female   Other Topics Concern    Parent/sibling w/ CABG, MI or angioplasty before 65F 55M? No        Family History  Family History   Problem Relation Age of Onset    Unknown/Adopted Mother     Uterine Cancer Mother     Unknown/Adopted Father     Unknown/Adopted  Maternal Grandmother     Unknown/Adopted Maternal Grandfather     Stomach Cancer Maternal Grandfather     Unknown/Adopted Paternal Grandmother     Unknown/Adopted Paternal Grandfather     Melanoma Maternal Uncle        Outpatient Medications:  [COMPLETED] CT Flush  [COMPLETED] iopamidol (ISOVUE-370) solution 71 mL    alectinib (ALECENSA) 150 MG CAPS, Take 2 capsules (300 mg) by mouth 2 times daily  citalopram (CELEXA) 20 MG tablet, Take 1 tablet (20 mg) by mouth every evening  hydrochlorothiazide (HYDRODIURIL) 25 MG tablet, Take 1 tablet (25 mg) by mouth daily  insulin aspart (NOVOLOG PEN) 100 UNIT/ML pen, Inject 2 Units Subcutaneous 3 times daily (before meals) Plus adjustment scale 1:20 for blood sugars >150mg/dL max daily 50 units  insulin glargine (LANTUS PEN) 100 UNIT/ML pen, Inject 36 Units Subcutaneous 2 times daily Increase as directed, Max daily dose 100 units  levETIRAcetam (KEPPRA) 1000 MG tablet, Take 1 tablet (1,000 mg) by mouth 2 times daily  lisinopril (ZESTRIL) 40 MG tablet, Take 1 tablet (40 mg) by mouth daily  metFORMIN (GLUCOPHAGE XR) 500 MG 24 hr tablet, Week 1: Take one of the 500mg tablet once daily with food; Week 2: increase to 2 of the 500mg (1000mg) tablets per day; Week 3: increase to 3 of the 500mg (1500mg) tablets per day  oxyCODONE (ROXICODONE) 5 MG tablet, Take 1 tablet (5 mg) by mouth every 4 hours as needed for moderate to severe pain  prochlorperazine (COMPAZINE) 10 MG tablet, Take 1 tablet (10 mg) by mouth every 6 hours as needed (Nausea/Vomiting)  alcohol swab prep pads, Use to swab area of injection/jabier as directed.  blood glucose (NO BRAND SPECIFIED) test strip, Use to test blood sugar 2 times daily or as directed. To accompany: Blood Glucose Monitor Brands: per insurance.  blood glucose calibration (NO BRAND SPECIFIED) solution, To accompany: Blood Glucose Monitor Brands: per insurance.  blood glucose monitoring (NO BRAND SPECIFIED) meter device kit, Use to test blood sugar 2  times daily or as directed. Preferred blood glucose meter OR supplies to accompany: Blood Glucose Monitor Brands: per insurance.  dexamethasone (DECADRON) 4 MG tablet, FOLLOW TAPERING SCHEDULE PROVIDED BY DOCTOR (Patient not taking: Reported on 11/8/2022)  insulin pen needle (31G X 8 MM) 31G X 8 MM miscellaneous, Use one pen needles daily or as directed.  thin (NO BRAND SPECIFIED) lancets, Use with lanceting device. To accompany: Blood Glucose Monitor Brands: per insurance.         Physical Exam:    Blood pressure (!) 146/103, pulse 77, temperature 98.2  F (36.8  C), temperature source Oral, resp. rate 16, SpO2 95 %.    Constitutional: Awake and conversational. Non- toxic appearing. No acute distress. Well developed, hydrated, and nourished. Appears stated age.   HEENT: Normocephalic, atraumatic without tenderness or palpable masses/depressions. Sclerae anicteric. PERRLA. EOM intact.  Lymph: Neck supple. No significant adenopathy noted.   Respiratory: Breathing comfortable with no increased work on room air. Good air exchange. No signs of respiratory distress or accessory muscle use. Lungs clear to auscultation bilaterally, no crackles or wheezing noted.  Cardiovascular: Regular rate and rhythm. Normal S1 and S2. No murmurs, rubs, or gallops. No peripheral edema.    GI: Abdomen is soft, non-distended, non-tender and without distension. Bowel sounds present and normoactive. No masses or hepatosplenomegaly appreciated.  Skin: Skin is clean, dry, intact. No jaundice appreciated.   Musculoskeletal/ Extremities: No redness, warmth, or swelling of the joints appreciated. Distal pulses palpable. Nailbeds pink and without cyanosis or clubbing.   Neurologic: Alert and oriented. Speech normal. Grossly nonfocal. Memory and thought process preserved. Moves extremities.    Neuropsychiatric: Calm, affect congruent to situation. Appropriate mood and affect. Good judgment and insight. No visual/auditory hallucinations.       Labs &  Studies: I personally reviewed the following studies:  ROUTINE LABS (Last four results):  CMP  Recent Labs   Lab 11/08/22  0813 11/08/22  0618 11/08/22  0145 11/07/22  1531   NA  --  140  --  143   POTASSIUM  --  3.2*  --  3.6   CHLORIDE  --  103  --  104   CO2  --  23  --  28   ANIONGAP  --  14  --  11   * 147* 151* 192*   BUN  --  15.1  --  16.7   CR  --  0.57*  --  0.68   GFRESTIMATED  --  >90  --  >90   TORY  --  8.4*  --  9.6   PROTTOTAL  --   --   --  6.6   ALBUMIN  --   --   --  4.1   BILITOTAL  --   --   --  1.0   ALKPHOS  --   --   --  79   AST  --   --   --  20   ALT  --   --   --  23     CBC  Recent Labs   Lab 11/08/22  0618 11/07/22  1531   WBC 8.4 11.4*   RBC 4.13* 4.34*   HGB 13.6 14.3   HCT 39.7* 40.1   MCV 96 92   MCH 32.9 32.9   MCHC 34.3 35.7   RDW 13.8 13.5    195     INRNo lab results found in last 7 days.    CTA Chest 11/7/22                                                     IMPRESSION:   1. Small pulmonary embolism in the right lower lobe pulmonary artery.  The filling defect has an eccentric appearance, which may indicate  more chronic thrombus. No evidence for right heart strain.  2. Stable 1.9 x 1.5 cm nodule in the right lower lobe.     MRI Brain 11/8/22  IMPRESSION:  1. No MRI findings of posterior reversible encephalopathy.  2. Stable postsurgical changes and multifocal areas of radiation  necrosis and intracranial metastases. No new focus of intracranial  enhancement.  This result has not been signed. Information might be incomplete.    B/L Doppler US 11/8/22                                                              IMPRESSION:  1.  No evidence of deep venous thrombosis in either lower extremity.     I have personally reviewed the examination and initial interpretation  and I agree with the findings.       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 Pleural, Brain s/p RT Brain (02/2022) and craniotomy 06/2022 currently on Alectinib 300mg BID and Bevacizumab for radiation necrosis (last dose 10/26/2022) who is currently hospitalized for a recently diagnosed PE.     PMH of T2DM, HTN, GERD and     PHYSICAL EXAM  Blood pressure (!) 146/103, pulse 77, temperature 98.2  F (36.8  C), temperature source Oral, resp. rate 16, SpO2 95 %.  Constitutional: Awake and conversational. Non- toxic appearing. No acute distress. Well developed, hydrated, and nourished. Appears stated age.   HEENT: Normocephalic, atraumatic without tenderness or palpable masses/depressions. Sclerae anicteric. PERRLA. EOM intact.  Lymph: Neck supple. No significant adenopathy noted.   Respiratory: Breathing comfortable with no increased work on room air. Good air exchange. No signs of respiratory distress or accessory muscle use. Lungs clear to auscultation bilaterally, no crackles or wheezing noted.  Cardiovascular: Regular rate and rhythm. Normal S1 and S2. No murmurs, rubs, or gallops. No peripheral edema.    GI: Abdomen is soft, non-distended, non-tender and without distension. Bowel sounds present and normoactive. No masses or hepatosplenomegaly appreciated.  Skin: Skin is clean, dry, intact. No jaundice appreciated.   Musculoskeletal/ Extremities: No redness, warmth, or swelling of the joints appreciated. Distal pulses palpable. Nailbeds pink and without cyanosis or clubbing.   Neurologic: Alert and oriented. Speech normal. Grossly nonfocal. Memory and thought process preserved. Moves extremities.    Neuropsychiatric: Calm, affect congruent to situation. Appropriate mood and affect. Good judgment and insight. No visual/auditory hallucinations.    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 Pleural, Brain s/p RT Brain (02/2022) and craniotomy 06/2022  currently on Alectinib 300mg BID and Bevacizumab for radiation necrosis (last dose 10/26/2022) who is currently hospitalized for a recently diagnosed PE.   The plan for anticoagulation has to take into account his brain metastases and recent treatment with bevacizumab which increases the risk of bleeding.  Lovenox is a better approach than a DOAC because of the shorter half-life.  I discussed the risks and potential benefits of anticoagulation strategies and he is agreeable to Lovenox.  The current issue with hypertension requires attention to blood pressure and management of this issue is a central concern and we appreciate the tremendous efforts to get his blood pressure under control.  He has orders for hydralazine, hydrochlorothiazide, labetalol, lisinopril, and propranolol.  .   - Recommend switching from Heparin to Lovenox 1mg/kg/12hours SC  - Recommend starting Olanzapine 5mg qPM PO to help with appetite.  - Consider PT/OT assessment for generalized weakness.  - Patient has a scheduled close oncologic follow up with oncology on  11/16/22.      Thank you for allowing us to participate in this patient's care.  The patient was seen and evaluated by me.  I discussed the patient with the fellow and agree with the findings and plan in the note, which was edited by me.    Sincerely,     Vito Augustine MD  Professor  [facility]  [phone]    I spent 35 minutes with the patient more than 50% of which was in counseling and coordination of care.

## 2022-11-08 NOTE — CONSULTS
[facility]  Neurology Consultation    Patient Name:  Connor Emerson  MRN:  [id number]    :  1980  Date of Service:  2022  Primary care provider:  Radha Shetty Ra      Neurology consultation service was asked to see Connor Emerson by JERRY Pierre to evaluate concern for PRES    Chief Complaint: Headaches    History of Present Illness:   Patient is a reliable historian. He has been having headaches ever since craniotomy in . They are always under his incision site. About 2 weeks ago, the pain intensified and headaches became more frequent. No triggers or palliating factors. He is not sure if there is change in headache with position. He snores at night. Takes ibuprofen once/day (600 mg at a time) for the past week with no relief. Tylenol, ice packs do not help either. Has been having nausea with the headaches for the past week and vomited once today. No photophobia, phonophobia, visual disturbance, gait disturbance, rhinorrhea/congestion/conjunctival injection with the headaches. Pt denies hx of seizure/seizure-like activity. He denies any neurological symptoms in general.     ROS  A comprehensive ROS was performed and pertinent findings were included in HPI.     PMH  Past Medical History:   Diagnosis Date     Atypical chest pain 2013     Cancer (H)      Complication of anesthesia      Diabetes (H)      GERD (gastroesophageal reflux disease) 2013     HTN (hypertension) 2012     HTN, goal below 140/90 2013     Insomnia 2012     Mediastinal lymphadenopathy      Migraine headache 2013     Migraine with aura, without mention of intractable migraine without mention of status migrainosus      Pneumonia      Past Surgical History:   Procedure Laterality Date     BRONCHOSCOPY RIGID OR FLEXIBLE W/TRANSENDOSCOPIC ENDOBRONCHIAL ULTRASOUND GUIDED Bilateral 2022    Procedure: Right BRONCHOSCOPY, FIBEROPTIC,  endobronchial ultrasound, pleural biopsy;  Surgeon: Dallin Agrawal MD;  Location: [facility] OR     INJECT BLOCK MEDIAL BRANCH CERVICAL/THORACIC/LUMBAR       INSERT CHEST TUBE Right 2/16/2022    Procedure: INSERTION, CATHETER, INTERCOSTAL, FOR DRAINAGE;  Surgeon: Dallin Agrawal MD;  Location: [facility] GI     INSERT CHEST TUBE Right 3/9/2022    Procedure: INSERTION, CATHETER, INTERCOSTAL, FOR DRAINAGE;  Surgeon: Sushila Antonio MD;  Location: [facility] GI     IR CHEST TUBE REMOVAL TUNNELED RIGHT  4/2/2022     OPTICAL TRACKING SYSTEM CRANIOTOMY, EXCISE TUMOR, COMBINED Left 6/28/2022    Procedure: Left stealth craniotomy for tumor resection with motor mapping;  Surgeon: Stephen Moran MD;  Location:  OR     ORTHOPEDIC SURGERY      Ganesh. Rotator cuff repair.     PLEUROSCOPY N/A 1/26/2022    Procedure: Pleuroscopy with Pleural Biopsy;  Surgeon: Dallin Agrawal MD;  Location: [facility] OR       Medications   I have personally reviewed the patient's medication list.     Allergies  I have personally reviewed the patient's allergy list.     Social History  Denies tobacco use. Uses marijuana several times per week. Drinks alcohol socially. Works in maintenance. Has 4 children.     Family History    No known family history of neurological disease.       Physical Examination   Vitals: BP (!) 147/99   Pulse 76   Temp 98.4  F (36.9  C) (Oral)   Resp 16   SpO2 98%   General: Lying in bed, NAD  Head: NC/AT  Eyes: no icterus, op pink and moist  Cardiac: RRR. Extremities warm, no edema.   Respiratory: non-labored on RA  GI: S/NT/ND  Skin: No rash or lesion on exposed skin  Psych: Mood pleasant, affect congruent  Neuro:  Mental status: Awake, alert, attentive, oriented to self, time, place, and circumstance. Language is fluent and coherent with intact comprehension of complex commands, naming and repetition.  Cranial nerves: VFF, Right pupil is 1 mm larger than left pupil. Both are briskly reactive to light.conjugate gaze, EOMI, facial sensation  intact, face symmetric, shoulder shrug strong, tongue/uvula midline, no dysarthria.   Motor: Normal bulk and tone. No abnormal movements. 5/5 strength bilaterally in deltoids, biceps, triceps, hand , hip extensors, knee flexion, plantarflexion, dorsiflexion. Hip flexion is 5- on the left and 5 on the right. Knee extension is 5- on the left and 5 on the right.   Reflexes: 2+ biceps, brachioradialis tendon reflexes bilaterally. Right Achilles tendon reflex is 2+ and left is trace. Babinski sign is absent bilaterally.   Sensory: Intact to light touch x 4 extremities. Proprioception is intact in both big toes.   Coordination: FNF and HS without ataxia or dysmetria.  Gait: Deferred    Investigations   I have personally reviewed pertinent labs, tests, and radiological imaging. Discussion of notable findings is included under Impression.     Impression  Mr. Emerson is a 42-year-old man with history of type 2 diabetes, hypertension, metastatic non-small cell lung cancer with known metastatic spread to the brain status post brain radiation in February 2022 and craniotomy with resection of tumor (pathology report showing radiation necrosis and no evidence of tumor) in June 2022. Patient reports intermittent severe headaches underneath incision site ever since the left craniotomy in 2002; severity and frequency of these headaches increased about 2 weeks ago from once every few days to now having 10/10 aching sensation under incision site almost continuously for the last 6 days that has prevented him from being able to go to work. The headache has not changed in quality or location, thus I am most suspicious for persistent post-craniotomy headaches as the etiology, as he did have an MRI brain with and without contrast on 10/26 (when he had already been experiencing these headaches for months) and MRI showed only expected findings.     That being said, patient last received Avastin on 10/26/2022. There are numerous case  reports of PRES secondary to Avastin, so this is a reasonable consideration in the setting of increased headaches.  Elevated diastolic blood pressure noted at oncology clinic is also an independent risk factor for PRES. No report of seizures/seizure-like activity or confusion/AMS to support a diagnosis of PRES, but it is still reasonable to consider.     Finally, patient does have weakness of proximal LLE compared to RLE. If MRI brain is unrevealing,     Recommendations  - MRI brain with and without contrast (tumor protocol). This is not urgent.   -Recommend goal blood pressure 140/90 lowered slowly over the next 6 hours while waiting for MRI brain.  - Further recommendations pending result of brain MRI    Thank you for involving Neurology in the care of Connor Emerson.  Please do not hesitate to call with questions/concerns (consult pager [phone]).      Patient was discussed with Dr. Sierra.     Kendrick French MD  PGY-4 Neurology Resident   November 8, 2022      Addendum:  S/ Seen by neurology day team. Feels that headache is improved this morning after getting some rest. Reports that current headaches started about two weeks ago and only change was starting bevacizumab. Denies surgical incision-site pain that resolved prior to current onset of headaches. Has phonophobia and phonophobia, but no nausea/vomiting.    Denies neuropathic symptoms, weakness, or falls.    O/ VSS.   MSE: Alert and conversant. Oriented to self, location, month/year. Follows simple commands. Naming intact.  CN: Gaze is conjugate. EOMI, no nystagmus. Pupils are equal, round, reactive to light. Facial sensation intact to light touch along the V1-V3 distributions. Facial movements symmetric. Hearing is intact to conversation. Tongue protrudes midline.  Motor: No abnormal movements.     Right Left   Shoulder abduction 5/5 5/5   Elbow flexion 5/5 5/5   Elbow extension 5/5 5/5   Wrist extension 5/5 5/5   Wrist flexion 5/5 5/5   Finger extension  5/5 5/5   Hip flexion  5/5  5/5   Knee extension 5/5 5/5 (impersistent activation)   Knee flexion 5/5 5/5   Ankle plantarflexion 5/5 5/5   Ankle dorsiflexion 5/5 5/5      Reflexes: 2+ biceps, brachioradialis, patellar, and Achilles reflexes. No clonus. Toes mute.  Sensory: Vibration intact in the UEs, 11sec at L hallux in LE, and 13sec at R hallux in LE. Intact to pinprick in the upper and lower extremities.  Coordination: FNF intact without dysmetria.  Gait: Normal stride length. No ataxia.     Brain MRI 11/8/2022: preliminary review with the neurology team shows prior metastatic disease, no significant changes compared to prior. No evidence of PRES.    AR/ 42yoM with h/o non-small cell lung cancer with mets to the brain s/p crani and resection and radiation who is being seen in evaluation for headache. Brain MRI was performed due to concern for PRES and preliminary review was negative.    Reports a history of migraines, but this headache is different. In reviewing the literature, bevacizumab may be associated in to up to 49% on the treatment. This would align temporally with when he started the medication. Consider switching off of bevacizmab (if able). Otherwise, if not possible, can trial a low dose of propranolol for headaches and to concurrently treat BP.     With regard to the above noted possible peripheral neuropathy, no notable motor or sensory deficits on repeat exam in the morning (see updated neurologic examination). Patient also denies motor-sensory symptoms and has not had falls. Therefore, no additional work-up at this time.    #Headache  -Follow official brain MRI  -Discuss with oncology for consideration of switching of bevacizumab  -If unable to switch chemotherapy, can trial low dose of propranolol 20mg twice daily    Neurology will sign off.    The patient was discussed with Dr. Sierra.    Sofia Murray MD  Neurology PGY-4  11/08/2022 3:13 PM

## 2022-11-08 NOTE — H&P
[facility]    History and Physical - Medicine Service, MAR[facility] TEAM        Date of Admission:  11/7/2022    Assessment & Plan     Connor Emerson is a 42 year old male with PMH significant for malignant neoplasm of lower lobe of right lung with treatment of alectinib and bevacizumab for radiation brain necrosis,T2DM, HTN and GERD who is admitted for management of PE.      #PE  Provoked by metastatic NSCLC vs bevacizumab. Pleuritic chest pain, N/V.   -tele  -O2 with O2 sat >90%  -heparin gtt  - would be reasonable to initiate Warfarin vs DOAC for long-term anticoagulation    #HTN Urgency   Adm with /111. No evidence of end-organ damage. States to be compliant with HTN medications. Ongoing concern about posterior reversible encephalopathy syndrome(PRES), that is an rare but known side effect of bevacizumab. CT head was unremarkable.  - goal 140/90  - MRI brain per neurology d/t ongoing c/f PRES  - pta HTCZ  - pta lisinopril   - labetalol PRN     #NSCLC with brain mets  Followed OP by Dr. Persaud. On  alectinib BID and bevacizumab every three week. 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    -pta alectinib BID per oncology  -oncology consulted, appreciate recommendations  -planned OP CT in 2 months    #T2DM  Off steroids since 10/31. Adm glu 198 , 6/22 A1C 12.3  Home regimen metformin, glargine 35 U and novolog scale with correction.  -MDSS  -hold pta metformin  -A1C  -hypoglycemia protocol      Chronic    #Myalgia  H/o of statin induced myalgia. Has been off rosuvastatin with improved sx    #Psychiatry   #Situational anxiety  -denied taking citalopram     Diet:  Regular   DVT Prophylaxis: heparin gtt for PE  Heart Catheter: Not present  Fluids: NA  Central Lines: None  Cardiac Monitoring: None  Code Status:  Full Code    Clinically Significant Risk Factors Present on  Admission                  # Hypertension: home medication list includes antihypertensive(s)             Disposition Plan      Expected Discharge Date: 11/09/2022                The patient's care was discussed with the Attending Physician, Dr. Quiles.    AMALIA ENCINAS MD  Medicine Service, [facility]  Securely message with the Vocera Web Console (learn more here)  Text page via [facility] Paging/Directory   Please see signed in provider for up to date coverage information    ______________________________________________________________________    Chief Complaint   Oncology clinic referal    History is obtained from the patient    History of Present Illness   Connor Emerson is a 42 year old male with PMH significant for malignant neoplasm of lower lobe of right lung with treatment of alectinib and bevacizumab for radiation brain necrosis,T2DM, HTN and GERD who presents to the ED from oncology clinic for hypertension and PE.    Today he went to his oncology clinic for a follow up. There he stated that he has been feeling fatigued for the past 2 weeks, unable to work his normal amount.  He has been having dull headaches intermittently.  Reports some slight shortness of breath with inability to take deep breaths. He endorses new right sided pleuritic chest pain on the lateral side that goes down a little bit.  Oncology clinic decided to get CT head and CT PE and refer him to ED.  Imaging revealed a smal PE in there R. Lower lobe pulmonary artery, no acute intracranial pathology and a stable nodule in the right lower lobe.   He has had intermittent nausea and vomiting.  Denies fevers or chills, chest pain, vision changes or other neurological deficits, diarrhea or constipation.  He has been doing  Tylenol, ibuprofen and ice packs with minimal resolution.  Reports he has been regularly taking his antihypertensive medications.  Previously been on  dexamethasone, tapering off since 10/21/2022 and last dose on 10/31/22  He also has on and off body aches that are chronic and stable, he has been off of his statin as well.  In the ED his vitals were significant for /111. He was started on heparin gtt.     Review of Systems    The 10 point Review of Systems is negative other than noted in the HPI or here.     Past Medical History    I have reviewed this patient's medical history and updated it with pertinent information if needed.   Past Medical History:   Diagnosis Date     Atypical chest pain 12/02/2013     Cancer (H)      Complication of anesthesia      Diabetes (H)      GERD (gastroesophageal reflux disease) 12/02/2013     HTN (hypertension) 05/14/2012     HTN, goal below 140/90 07/02/2013     Insomnia 02/21/2012     Mediastinal lymphadenopathy      Migraine headache 07/02/2013     Migraine with aura, without mention of intractable migraine without mention of status migrainosus      Pneumonia         Past Surgical History   I have reviewed this patient's surgical history and updated it with pertinent information if needed.  Past Surgical History:   Procedure Laterality Date     BRONCHOSCOPY RIGID OR FLEXIBLE W/TRANSENDOSCOPIC ENDOBRONCHIAL ULTRASOUND GUIDED Bilateral 1/26/2022    Procedure: Right BRONCHOSCOPY, FIBEROPTIC, endobronchial ultrasound, pleural biopsy;  Surgeon: Dallin Agrawal MD;  Location: [facility] OR     INJECT BLOCK MEDIAL BRANCH CERVICAL/THORACIC/LUMBAR       INSERT CHEST TUBE Right 2/16/2022    Procedure: INSERTION, CATHETER, INTERCOSTAL, FOR DRAINAGE;  Surgeon: Dallin Agrawal MD;  Location: [facility] GI     INSERT CHEST TUBE Right 3/9/2022    Procedure: INSERTION, CATHETER, INTERCOSTAL, FOR DRAINAGE;  Surgeon: Sushila Antonio MD;  Location: [facility] GI     IR CHEST TUBE REMOVAL TUNNELED RIGHT  4/2/2022     OPTICAL TRACKING SYSTEM CRANIOTOMY, EXCISE TUMOR, COMBINED Left 6/28/2022    Procedure: Left stealth craniotomy for tumor resection with motor  mapping;  Surgeon: Stephen Moran MD;  Location:  OR     ORTHOPEDIC SURGERY      Ganesh. Rotator cuff repair.     PLEUROSCOPY N/A 2022    Procedure: Pleuroscopy with Pleural Biopsy;  Surgeon: Dallin Agrawal MD;  Location:  OR        Social History   I have personally reviewed the social history with the patient showing.    Family History   I have reviewed this patient's family history and updated it with pertinent information if needed.  Family History   Problem Relation Age of Onset     Unknown/Adopted Mother      Uterine Cancer Mother      Unknown/Adopted Father      Unknown/Adopted Maternal Grandmother      Unknown/Adopted Maternal Grandfather      Stomach Cancer Maternal Grandfather      Unknown/Adopted Paternal Grandmother      Unknown/Adopted Paternal Grandfather      Melanoma Maternal Uncle        Prior to Admission Medications   Prior to Admission Medications   Prescriptions Last Dose Informant Patient Reported? Taking?   Continuous Blood Gluc  (FREESTYLE IRENE 2 READER) YVES   Yes No   Patient not taking: Reported on 10/27/2022   Continuous Blood Gluc Sensor (FREESTYLE IRENE 2 SENSOR) MISC   No No   Si each every 14 days Use 1 sensor every 14 days. Use to read blood sugars per 's instructions.   Patient not taking: Reported on 10/27/2022   alcohol swab prep pads  Self No No   Sig: Use to swab area of injection/jabier as directed.   alectinib (ALECENSA) 150 MG CAPS   No No   Sig: Take 2 capsules (300 mg) by mouth 2 times daily   blood glucose (NO BRAND SPECIFIED) test strip  Self No No   Sig: Use to test blood sugar 2 times daily or as directed. To accompany: Blood Glucose Monitor Brands: per insurance.   blood glucose calibration (NO BRAND SPECIFIED) solution  Self No No   Sig: To accompany: Blood Glucose Monitor Brands: per insurance.   blood glucose monitoring (NO BRAND SPECIFIED) meter device kit  Self No No   Sig: Use to test blood sugar 2 times daily or as directed.  Preferred blood glucose meter OR supplies to accompany: Blood Glucose Monitor Brands: per insurance.   citalopram (CELEXA) 20 MG tablet   No No   Sig: Take 1 tablet (20 mg) by mouth every evening   dexamethasone (DECADRON) 4 MG tablet   No No   Sig: FOLLOW TAPERING SCHEDULE PROVIDED BY DOCTOR   Patient taking differently: Take 1 mg by mouth daily   hydrochlorothiazide (HYDRODIURIL) 25 MG tablet   No No   Sig: Take 1 tablet (25 mg) by mouth daily   insulin aspart (NOVOLOG PEN) 100 UNIT/ML pen   No No   Sig: Inject 2 Units Subcutaneous 3 times daily (before meals) Plus adjustment scale 1:20 for blood sugars >150mg/dL max daily 50 units   insulin glargine (LANTUS PEN) 100 UNIT/ML pen   No No   Sig: Inject 36 Units Subcutaneous 2 times daily Increase as directed, Max daily dose 100 units   insulin pen needle (31G X 8 MM) 31G X 8 MM miscellaneous  Self No No   Sig: Use one pen needles daily or as directed.   levETIRAcetam (KEPPRA) 1000 MG tablet   No No   Sig: Take 1 tablet (1,000 mg) by mouth 2 times daily   lisinopril (ZESTRIL) 40 MG tablet   No No   Sig: Take 1 tablet (40 mg) by mouth daily   metFORMIN (GLUCOPHAGE XR) 500 MG 24 hr tablet   No No   Sig: Week 1: Take one of the 500mg tablet once daily with food; Week 2: increase to 2 of the 500mg (1000mg) tablets per day; Week 3: increase to 3 of the 500mg (1500mg) tablets per day   oxyCODONE (ROXICODONE) 5 MG tablet   No No   Sig: Take 1 tablet (5 mg) by mouth every 4 hours as needed for moderate to severe pain   Patient not taking: Reported on 10/27/2022   prochlorperazine (COMPAZINE) 10 MG tablet   No No   Sig: Take 1 tablet (10 mg) by mouth every 6 hours as needed (Nausea/Vomiting)   Patient not taking: Reported on 10/27/2022   thin (NO BRAND SPECIFIED) lancets  Self No No   Sig: Use with lanceting device. To accompany: Blood Glucose Monitor Brands: per insurance.      Facility-Administered Medications: None     Allergies   Allergies   Allergen Reactions     Vicodin  [Hydrocodone-Acetaminophen] Nausea and Vomiting and GI Disturbance       Physical Exam   Vital Signs: Temp: 98.4  F (36.9  C) Temp src: Oral BP: (!) 147/99 Pulse: 76   Resp: 16 SpO2: 98 % O2 Device: None (Room air)    Weight: 0 lbs 0 oz    Constitutional: awake, alert, cooperative, no apparent distress, and appears stated age  Eyes:  pupils equal, round and reactive to light, extra ocular muscles intact  ENT: Normocephalic, without obvious abnormality, atraumatic,  Respiratory: No increased work of breathing, crackles on the R lower and middle lobe, wheezing on the R Lower lobe  Cardiovascular: Normal apical impulse, regular rate and rhythm, normal S1 and S2, no S3 or S4, and no murmur noted  GI: active bowel sound, nontender, soft, no rebound, no guarding  Skin: no bruising or bleeding  Musculoskeletal: There is no redness, warmth, or swelling of the joints.    Neurologic: Awake, alert, oriented to name, place and time.  Cranial nerves II-XII are grossly intact.     Data   Data reviewed today: I reviewed all medications, new labs and imaging results over the last 24 hours.    Recent Labs   Lab 11/07/22  1531   WBC 11.4*   HGB 14.3   MCV 92         POTASSIUM 3.6   CHLORIDE 104   CO2 28   BUN 16.7   CR 0.68   ANIONGAP 11   TORY 9.6   *   ALBUMIN 4.1   PROTTOTAL 6.6   BILITOTAL 1.0   ALKPHOS 79   ALT 23   AST 20

## 2022-11-08 NOTE — PROGRESS NOTES
Shift:  [id number]    VS:  Pt slightly hypertensive, max /117. PRN labetalol given per orders. Otherwise VSS  Pain:  Denies  Neuro:  A/Ox4, denies current headache  Cardiac:  WDL  Respiratory:  WDL  Diet/Appetite:  Regular diet, poor appetite. Pt declined breakfast and lunch, only drank half bottle of diet coke.  GI/:  WDL  LDAs:  PIV infusing heparin  Skin:  WDL  Activity:  Up independently in room and to bathroom  Test/Procedures: MRI: unchanged from previous. US LE: negative for DVT  Labs:  Potassium: 3.2, replaced per protocol. Awaiting re-check. Heparin Xa: 1.10, infusion reduced per protocol. Awaiting re-check.      Report called to Dhaval on 7D, transport requested.

## 2022-11-08 NOTE — ED TRIAGE NOTES
"Pt was sent from the clinic for a PE. Pt denies any current sob. Pt reports right sided \"lung pain\" worse with deep breaths.      Triage Assessment     Row Name 11/07/22 1925       Triage Assessment (Adult)    Airway WDL WDL       Respiratory WDL    Respiratory WDL WDL       Skin Circulation/Temperature WDL    Skin Circulation/Temperature WDL WDL       Peripheral/Neurovascular WDL    Peripheral Neurovascular WDL WDL       Cognitive/Neuro/Behavioral WDL    Cognitive/Neuro/Behavioral WDL WDL              "

## 2022-11-09 VITALS
TEMPERATURE: 98.7 F | HEART RATE: 80 BPM | HEIGHT: 67 IN | DIASTOLIC BLOOD PRESSURE: 92 MMHG | OXYGEN SATURATION: 94 % | BODY MASS INDEX: 32.85 KG/M2 | RESPIRATION RATE: 18 BRPM | WEIGHT: 209.3 LBS | SYSTOLIC BLOOD PRESSURE: 135 MMHG

## 2022-11-09 LAB
GLUCOSE BLDC GLUCOMTR-MCNC: 158 MG/DL (ref 70–99)
GLUCOSE BLDC GLUCOMTR-MCNC: 165 MG/DL (ref 70–99)
POTASSIUM SERPL-SCNC: 3.4 MMOL/L (ref 3.4–5.3)

## 2022-11-09 PROCEDURE — 84132 ASSAY OF SERUM POTASSIUM: CPT | Performed by: INTERNAL MEDICINE

## 2022-11-09 PROCEDURE — 250N000013 HC RX MED GY IP 250 OP 250 PS 637: Performed by: INTERNAL MEDICINE

## 2022-11-09 PROCEDURE — 36415 COLL VENOUS BLD VENIPUNCTURE: CPT | Performed by: INTERNAL MEDICINE

## 2022-11-09 PROCEDURE — 999N000147 HC STATISTIC PT IP EVAL DEFER

## 2022-11-09 PROCEDURE — 250N000012 HC RX MED GY IP 250 OP 636 PS 637

## 2022-11-09 PROCEDURE — 250N000011 HC RX IP 250 OP 636: Performed by: STUDENT IN AN ORGANIZED HEALTH CARE EDUCATION/TRAINING PROGRAM

## 2022-11-09 PROCEDURE — 99239 HOSP IP/OBS DSCHRG MGMT >30: CPT | Mod: GC | Performed by: INTERNAL MEDICINE

## 2022-11-09 PROCEDURE — 250N000011 HC RX IP 250 OP 636

## 2022-11-09 PROCEDURE — 250N000013 HC RX MED GY IP 250 OP 250 PS 637

## 2022-11-09 PROCEDURE — 999N000111 HC STATISTIC OT IP EVAL DEFER: Performed by: OCCUPATIONAL THERAPIST

## 2022-11-09 RX ORDER — POTASSIUM CHLORIDE 750 MG/1
40 TABLET, EXTENDED RELEASE ORAL ONCE
Status: COMPLETED | OUTPATIENT
Start: 2022-11-09 | End: 2022-11-09

## 2022-11-09 RX ORDER — OXYCODONE HYDROCHLORIDE 5 MG/1
5 TABLET ORAL EVERY 4 HOURS PRN
Qty: 10 TABLET | Refills: 0 | Status: SHIPPED | OUTPATIENT
Start: 2022-11-09 | End: 2022-12-26

## 2022-11-09 RX ORDER — PROPRANOLOL HYDROCHLORIDE 20 MG/1
20 TABLET ORAL 2 TIMES DAILY
Qty: 60 TABLET | Refills: 0 | Status: SHIPPED | OUTPATIENT
Start: 2022-11-09 | End: 2022-11-22

## 2022-11-09 RX ORDER — ENOXAPARIN SODIUM 100 MG/ML
1 INJECTION SUBCUTANEOUS EVERY 12 HOURS
Qty: 28 ML | Refills: 0 | Status: SHIPPED | OUTPATIENT
Start: 2022-11-09 | End: 2022-11-23

## 2022-11-09 RX ORDER — METHOCARBAMOL 500 MG/1
500 TABLET, FILM COATED ORAL 4 TIMES DAILY
Qty: 30 TABLET | Refills: 0 | Status: SHIPPED | OUTPATIENT
Start: 2022-11-09 | End: 2023-05-17

## 2022-11-09 RX ORDER — METHOCARBAMOL 500 MG/1
500 TABLET, FILM COATED ORAL 4 TIMES DAILY
Status: DISCONTINUED | OUTPATIENT
Start: 2022-11-09 | End: 2022-11-09 | Stop reason: HOSPADM

## 2022-11-09 RX ADMIN — LABETALOL HYDROCHLORIDE 10 MG: 5 INJECTION, SOLUTION INTRAVENOUS at 12:33

## 2022-11-09 RX ADMIN — HYDROCHLOROTHIAZIDE 25 MG: 25 TABLET ORAL at 08:35

## 2022-11-09 RX ADMIN — ENOXAPARIN SODIUM 100 MG: 100 INJECTION SUBCUTANEOUS at 08:35

## 2022-11-09 RX ADMIN — INSULIN ASPART 1 UNITS: 100 INJECTION, SOLUTION INTRAVENOUS; SUBCUTANEOUS at 08:59

## 2022-11-09 RX ADMIN — POTASSIUM CHLORIDE 40 MEQ: 750 TABLET, EXTENDED RELEASE ORAL at 13:08

## 2022-11-09 RX ADMIN — LABETALOL HYDROCHLORIDE 10 MG: 5 INJECTION, SOLUTION INTRAVENOUS at 02:08

## 2022-11-09 RX ADMIN — LISINOPRIL 40 MG: 40 TABLET ORAL at 08:35

## 2022-11-09 RX ADMIN — LEVETIRACETAM 1000 MG: 500 TABLET, FILM COATED ORAL at 08:35

## 2022-11-09 ASSESSMENT — ACTIVITIES OF DAILY LIVING (ADL)
ADLS_ACUITY_SCORE: 18
DEPENDENT_IADLS:: MEDICATION MANAGEMENT
ADLS_ACUITY_SCORE: 18

## 2022-11-09 NOTE — PLAN OF CARE
Physical Therapy: Orders received and appreciated. Chart reviewed and discussed with care team.? Physical Therapy not indicated due to lack of mobility concerns.?In discussion with pt, he denied any changes with balance, strength or endurance and reported being IND in room. Defer discharge recommendations to medical team.? Will complete orders.

## 2022-11-09 NOTE — DISCHARGE SUMMARY
[facility]  Discharge Summary - Medicine & Pediatrics       Date of Admission:  11/7/2022  Date of Discharge:  11/9/11  Discharging Provider: Dr. 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 Pt's symptoms improving daily with MRI imaging findings not significant for new pathology, however Ct chest revealing R Lower lobe PE, which was managed according to protocol.     Today's changes :     #PE  #HTN Urgency   #Headache  #NSCLC with brain mets   #Generalized weakness  #T2DM    Chronic  #Myalgia  #Psychiatry   #Situational anxiety  # H/o seizures      Follow-ups Needed After Discharge   Follow-up Appointments     Adult [facility] Follow-up and recommended labs and tests      Follow up with primary care provider, Radha Shetty, within 7 days   to evaluate medication change, to evaluate treatment change, and for   hospital follow- up.  The following labs/tests are recommended such as   CBC.    Follow up with Dr. Persaud oncologist, at [facility] hematology, oncology   transplantation clinic on November 16th 2022  to evaluate medication   change such as discontinuing Avastin change, to evaluate treatment change,   and for hospital follow- up.    Appointments on [place] and/or [facility] (with [facility] or [facility]   provider or service). Call [phone] if you haven't heard regarding   these appointments within 7 days of discharge.             Unresulted Labs Ordered in the Past 30 Days of this Admission     Date and Time Order Name Status Description    11/9/2022  8:49 AM Potassium In process       These results will be followed up by PCP    Discharge Disposition   Discharged to home  Condition  at discharge: Stable    Hospital Course     Connor Emerson is a 42 year old male with PMH significant for malignant neoplasm of lower lobe of right lung with treatment of alectinib and bevacizumab for radiation brain necrosis,T2DM, HTN and GERD who is admitted for management of PE.     #PE  Provoked by metastatic NSCLC vs bevacizumab. Pleuritic chest pain, N/V.  Paged Neurology post-MRI for long-term AC.  - Pt to continue Lovenox 1mg/kg BID subcutaneous at home, will be instructed by the nurse     #HTN Urgency   Adm with /111. No evidence of end-organ damage. States to be compliant with HTN medications. Ongoing concern about posterior reversible encephalopathy syndrome(PRES), that is an rare but known side effect of bevacizumab. CT head was unremarkable. MRI brain revealed no PRES, stable necrotic/mets areas and no new enhancement. Oncology consulted see below reccs.   - PTA HTCZ  - PTA lisinopril   - Prescribed propranolol 20mg BID  - Pt to follow up with PCP in regards to BP mgmt     #Headache  #NSCLC with brain mets  Followed OP by Dr. Persaud. On  alectinib BID and bevacizumab every three week. 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  Per Neuro, his h/o migraines could be attributed to bevacizumab and this would align when he started the medication.     - Prescribed propranolol 20mg BID  - Pt to follow up with PCP in regards to BP mgmt    - PTA alectinib BID per oncology  - Discontinue Avastin (Discussed with primary oncologist Dr. Persaud who agrees with this)   - Recommend switching from Heparin to Lovenox 1mg/kg/12hours SC  - Recommend starting Olanzapine 5mg qPM PO to help with appetite.  - Consider PT/OT assessment for generalized weakness.  - Patient has a scheduled close oncologic follow up with oncology on  11/16/22.  #Generalized weakness  Occupational Therapy not indicated due to pt at ADL  baseline, denies change in function.     #T2DM  Off steroids since 10/31. Adm glu 198 , 6/22 A1C 12.3  Home regimen metformin, glargine 35 U and novolog scale with correction.  -PTA metformin       Chronic     #Myalgia  H/o of statin induced myalgia. Has been off rosuvastatin with improved sx     #Psychiatry   #Situational anxiety  # H/o seizures  - denied taking citalopram  - PTA Keppra daily     Consultations This Hospital Stay   PHARMACY IP CONSULT  PHARMACY IP CONSULT  ONCOLOGY ADULT IP CONSULT  PHYSICAL THERAPY ADULT IP CONSULT  OCCUPATIONAL THERAPY ADULT IP CONSULT  CARE MANAGEMENT / SOCIAL WORK IP CONSULT    Code Status   Full Code       The patient was discussed with Dr. Alberto Ware MD  Teaching service [place] [address] Medical service  Formerly [facility] [address] [address]  Phone: [phone]  ______________________________________________________________________    Physical Exam   Vital Signs: Temp: 98.7  F (37.1  C) Temp src: Oral BP: (!) 151/94 Pulse: 80   Resp: 18 SpO2: 94 % O2 Device: None (Room air)    Weight: 209 lbs 4.8 oz  General Appearance:  Sitting in bed, NAD, awake, alert, oriented  Respiratory: non-labored on RA  Cardiovascular: RRR, extremities warm, no edema  GI: S/NT/ND  Skin: No rash or lesions noted  Other: no neew onset of neuropathy or headache        Primary Care Physician   Radha Shetty    Discharge Orders      Reason for your hospital stay    You were hospitalized for a recently diagnosed PE an intermittent severe headache. A chest x-ray has been taken and has revealed right lower lobe pulmonary embolism. Also, an MRI of your brain revealed no specific findings of posterior reversible encephalopathy, stable postsurgical changes and multifocal areas of radiation necrosis and intracranial metastases and no new focus of intracranial enhancement. For the above mentioned reasons, you have been treated initiated on a anticoagulation  therapy, beta-blockers and anti-hypertensive medicaitons which you will need to continue at home. You were also admitted with uncontrolled hypertension, which was also treated as stated above. Upon discharge today, your overall health has been improving.     Activity    Your activity upon discharge: activity as tolerated     Adult [facility] Follow-up and recommended labs and tests    Follow up with primary care provider, Radha Shetty, within 7 days to evaluate medication change, to evaluate treatment change, and for hospital follow- up.  The following labs/tests are recommended such as CBC.    Follow up with Dr. Persaud oncologist, at [facility] hematology, oncology transplantation clinic on November 16th 2022  to evaluate medication change such as discontinuing Avastin change, to evaluate treatment change, and for hospital follow- up.    Appointments on [place] and/or [facility] (with [facility] or [facility] provider or service). Call [phone] if you haven't heard regarding these appointments within 7 days of discharge.     Diet    Follow this diet upon discharge: Orders Placed This Encounter      Combination Diet Regular Diet Adult  Can resume regular diet       Significant Results and Procedures   Most Recent 3 CBC's:Recent Labs   Lab Test 11/08/22  0618 11/07/22  1531 10/18/22  0833   WBC 8.4 11.4* 13.7*   HGB 13.6 14.3 14.4   MCV 96 92 94    195 213     Most Recent 3 BMP's:Recent Labs   Lab Test 11/09/22  1104 11/09/22  0731 11/09/22  0208 11/08/22  2134 11/08/22  1708 11/08/22  1435 11/08/22  0813 11/08/22  0618 11/08/22  0145 11/07/22  1531 10/26/22  0832 10/18/22  0833   NA  --   --   --   --   --   --   --  140  --  143  --  144   POTASSIUM 3.4  --   --   --   --  3.5  --  3.2*  --  3.6   < > 3.2*   CHLORIDE  --   --   --   --   --   --   --  103  --  104  --  102   CO2  --   --   --   --   --   --   --  23  --  28  --  27   BUN  --   --   --   --   --   --   --  15.1  --  16.7  --  22.1*   CR  --    --   --   --   --   --   --  0.57*  --  0.68  --  0.51*   ANIONGAP  --   --   --   --   --   --   --  14  --  11  --  15   TORY  --   --   --   --   --   --   --  8.4*  --  9.6  --  9.0   GLC  --  158* 165* 155*   < >  --    < > 147*   < > 192*  --  151*    < > = values in this interval not displayed.     Most Recent 3 INR's:Recent Labs   Lab Test 06/28/22  1109 04/02/22  1114 04/25/15  1700   INR 0.90 1.03 1.01     Most Recent 3 Troponin's:Recent Labs   Lab Test 08/20/16  1950   TROPI <0.015  The 99th percentile for upper reference range is 0.045 ug/L.  Troponin values in   the range of 0.045 - 0.120 ug/L may be associated with risks of adverse   clinical events.       Most Recent D-dimer:Recent Labs   Lab Test 11/07/22  1531   DD 0.51*     7-Day Micro Results     Collected Updated Procedure Result Status      11/08/2022 1240 11/08/2022 1411 Asymptomatic COVID-19 Virus (Coronavirus) by PCR Nose [[id number]]    Swab from Nose    Final result Component Value   SARS CoV2 PCR Negative   NEGATIVE: SARS-CoV-2 (COVID-19) RNA not detected, presumed negative.                Most Recent Urinalysis:Recent Labs   Lab Test 10/26/22  0832 09/28/22  1116 05/12/20  1005 08/10/18  1209   COLOR  --   --  Yellow Yellow   APPEARANCE  --   --  Clear Clear   URINEGLC  --   --  >=1000* Negative   URINEBILI  --   --  Negative Small*   URINEKETONE  --   --  15* Negative   SG  --   --  1.010 1.025   UBLD  --   --  Negative Negative   URINEPH  --   --  5.0 6.5   PROTEIN Negative   < > Negative 30*   UROBILINOGEN  --   --  0.2 0.2   NITRITE  --   --  Negative Negative   LEUKEST  --   --  Negative Negative   RBCU  --   --   --  O - 2   WBCU  --   --   --  0 - 5    < > = values in this interval not displayed.     Most Recent ESR & CRP:Recent Labs   Lab Test 10/26/22  0832   SED 5   CRP 3.16   ,   Results for orders placed or performed during the hospital encounter of 11/07/22   MR Brain w/o & w Contrast    Narrative    EXAM: MR BRAIN W/O & W  CONTRAST  11/8/2022 12:21 PM     HISTORY:  r/o PRES, hx brain necrosis from radiation       COMPARISON:  MRI 10/26/2022, 8/1/2022, 6/29/2022, 6/27/2022, 6/10/2022    TECHNIQUE: Sagittal and axial T1-weighted, axial diffusion,  multiplanar T2 FLAIR with fat saturation images were obtained without  intravenous contrast. Following intravenous gadolinium-based contrast  administration, axial T2-weighted, axial susceptibility, and  T1-weighted images (in multiple planes) were obtained.    CONTRAST: 10mL Gadavist.    FINDINGS:  Stable left frontal lobe craniotomy and mass resection changes with  trace marginal T2 hyperintense signal compatible with edema/gliosis.    Stable right frontal lobe triangular-shaped, punctate left frontal  lobe (axial series 3, image 60; axial series 5 and 11, image 14; and  left cerebellar cortex (axial series 3, image 51; axial series 5 and  11, image 5) T2 hyperintensity, both with persistent internal  abnormally high signal on diffusion weighted images and subtle  postcontrast enhancement.    There is no mass effect, midline shift, or acute intracranial  hemorrhage. The ventricles are proportionate to the cerebral sulci.  Major intracranial vascular structures are within normal limits.    No suspicious abnormality of the skull marrow signal. Clear paranasal  sinuses. Mastoid air cells are clear. No focal abnormality of the  pituitary gland, sella, skull base and upper cervical spinal  structures on sagittal images. The orbits are normal.      Impression    IMPRESSION:  1. No MRI findings of posterior reversible encephalopathy.  2. Stable postsurgical changes and multifocal areas of radiation  necrosis and intracranial metastases. No new focus of intracranial  enhancement.    I have personally reviewed the examination and initial interpretation  and I agree with the findings.    ORLANDO PAREDES MD         SYSTEM ID:  [id number]   US Lower Extremity Venous Duplex Bilateral    Narrative     EXAMINATION: DOPPLER VENOUS ULTRASOUND OF BILATERAL LOWER EXTREMITIES,  11/8/2022 1:33 PM     COMPARISON: None.    HISTORY: 42-year-old male with recent PE, concern for DVT.    TECHNIQUE:  Gray-scale evaluation with compression, spectral flow and  color Doppler assessment of the deep venous system of both legs from  groin to knee, and then at the ankles.    FINDINGS:  In both lower extremities, the common femoral, femoral, popliteal and  posterior tibial veins demonstrate normal compressibility and blood  flow.      Impression    IMPRESSION:  1.  No evidence of deep venous thrombosis in either lower extremity.    I have personally reviewed the examination and initial interpretation  and I agree with the findings.    RINA WHEELER MD         SYSTEM ID:  [id number]       Discharge Medications   Current Discharge Medication List      START taking these medications    Details   enoxaparin ANTICOAGULANT (LOVENOX) 100 MG/ML syringe Inject 1 mL (100 mg) Subcutaneous every 12 hours for 14 days  Qty: 28 mL, Refills: 0    Associated Diagnoses: Single subsegmental pulmonary embolism without acute cor pulmonale (H)      methocarbamol (ROBAXIN) 500 MG tablet Take 1 tablet (500 mg) by mouth 4 times daily  Qty: 30 tablet, Refills: 0    Associated Diagnoses: Nonintractable episodic headache, unspecified headache type      propranolol (INDERAL) 20 MG tablet Take 1 tablet (20 mg) by mouth 2 times daily for 30 days  Qty: 60 tablet, Refills: 0    Associated Diagnoses: Nonintractable episodic headache, unspecified headache type         CONTINUE these medications which have NOT CHANGED    Details   alectinib (ALECENSA) 150 MG CAPS Take 2 capsules (300 mg) by mouth 2 times daily  Qty: 168 capsule, Refills: 0    Associated Diagnoses: Malignant neoplasm of lower lobe of right lung (H)      citalopram (CELEXA) 20 MG tablet Take 1 tablet (20 mg) by mouth every evening  Qty: 90 tablet, Refills: 0    Associated Diagnoses: Anxiety       hydrochlorothiazide (HYDRODIURIL) 25 MG tablet Take 1 tablet (25 mg) by mouth daily  Qty: 30 tablet, Refills: 3    Associated Diagnoses: Primary hypertension      insulin aspart (NOVOLOG PEN) 100 UNIT/ML pen Inject 2 Units Subcutaneous 3 times daily (before meals) Plus adjustment scale 1:20 for blood sugars >150mg/dL max daily 50 units  Qty: 45 mL, Refills: 2    Comments: <100 mg/dL:take no insulin injections; 100-150 mg/dL:2 units; 150-169 mg/dL:3 units; 170-189 mg/dL 4 units; 190-209 mg/dL:5 units; 210-229 mg/dL:6 units ; 230-249 mg/dL:7 units; 250-269 mg/dL:8 units; 270-289 mg/dL:10 units; 290-309 mg/dL: 12 units; etc  Associated Diagnoses: Type 2 diabetes mellitus with hyperglycemia, with long-term current use of insulin (H)      insulin glargine (LANTUS PEN) 100 UNIT/ML pen Inject 36 Units Subcutaneous 2 times daily Increase as directed, Max daily dose 100 units  Qty: 30 mL, Refills: 2    Comments: If Lantus is not covered by insurance, may substitute Basaglar or Semglee or other insulin glargine product per insurance preference at same dose and frequency.    Associated Diagnoses: Type 2 diabetes mellitus with hyperglycemia, with long-term current use of insulin (H)      levETIRAcetam (KEPPRA) 1000 MG tablet Take 1 tablet (1,000 mg) by mouth 2 times daily  Qty: 60 tablet, Refills: 0    Associated Diagnoses: Malignant neoplasm of lower lobe of right lung (H); Brain metastasis (H)      lisinopril (ZESTRIL) 40 MG tablet Take 1 tablet (40 mg) by mouth daily  Qty: 30 tablet, Refills: 2    Associated Diagnoses: HTN, goal below 140/90      metFORMIN (GLUCOPHAGE XR) 500 MG 24 hr tablet Week 1: Take one of the 500mg tablet once daily with food; Week 2: increase to 2 of the 500mg (1000mg) tablets per day; Week 3: increase to 3 of the 500mg (1500mg) tablets per day  Qty: 90 tablet, Refills: 2    Associated Diagnoses: Type 2 diabetes mellitus with hyperglycemia, with long-term current use of insulin (H)      oxyCODONE  (ROXICODONE) 5 MG tablet Take 1 tablet (5 mg) by mouth every 4 hours as needed for moderate to severe pain  Qty: 40 tablet, Refills: 0    Associated Diagnoses: Brain metastasis (H)      prochlorperazine (COMPAZINE) 10 MG tablet Take 1 tablet (10 mg) by mouth every 6 hours as needed (Nausea/Vomiting)  Qty: 30 tablet, Refills: 2    Associated Diagnoses: Malignant neoplasm of lower lobe of right lung (H)      alcohol swab prep pads Use to swab area of injection/jabier as directed.  Qty: 100 each, Refills: 3    Associated Diagnoses: Type 2 diabetes mellitus with hyperglycemia, with long-term current use of insulin (H)      blood glucose (NO BRAND SPECIFIED) test strip Use to test blood sugar 2 times daily or as directed. To accompany: Blood Glucose Monitor Brands: per insurance.  Qty: 100 strip, Refills: 6    Associated Diagnoses: Type 2 diabetes mellitus with hyperglycemia, with long-term current use of insulin (H)      blood glucose calibration (NO BRAND SPECIFIED) solution To accompany: Blood Glucose Monitor Brands: per insurance.  Qty: 1 each, Refills: 0    Associated Diagnoses: Type 2 diabetes mellitus with hyperglycemia, with long-term current use of insulin (H)      blood glucose monitoring (NO BRAND SPECIFIED) meter device kit Use to test blood sugar 2 times daily or as directed. Preferred blood glucose meter OR supplies to accompany: Blood Glucose Monitor Brands: per insurance.  Qty: 1 kit, Refills: 0    Associated Diagnoses: Type 2 diabetes mellitus with hyperglycemia, with long-term current use of insulin (H)      insulin pen needle (31G X 8 MM) 31G X 8 MM miscellaneous Use one pen needles daily or as directed.  Qty: 100 each, Refills: 0    Associated Diagnoses: Type 2 diabetes mellitus without complication (H)      thin (NO BRAND SPECIFIED) lancets Use with lanceting device. To accompany: Blood Glucose Monitor Brands: per insurance.  Qty: 100 each, Refills: 6    Associated Diagnoses: Type 2 diabetes mellitus  with hyperglycemia, with long-term current use of insulin (H)         STOP taking these medications       dexamethasone (DECADRON) 4 MG tablet Comments:   Reason for Stopping:             Allergies   Allergies   Allergen Reactions     Vicodin [Hydrocodone-Acetaminophen] Nausea and Vomiting and GI Disturbance

## 2022-11-09 NOTE — PLAN OF CARE
"Goal Outcome Evaluation:  BP (!) 135/92   Pulse 80   Temp 98.7  F (37.1  C) (Oral)   Resp 18   Ht 1.7 m (5' 6.93\")   Wt 94.9 kg (209 lb 4.8 oz)   SpO2 94%   BMI 32.85 kg/m      A&O x 4, AVSS ex 's - 150's. Gave prn labetalol 10mg x 1.  PO potassium replaced 3.4 and pt discharging home.  PIV removed.         Discharge  D: Orders for discharge and outpatient medications written.  I: Home medications and return to clinic schedule reviewed with patient. Discharge instructions and parameters for calling Health Care Provider reviewed. Patient left at 1330 accompanied by his wife.   A: Patient/family verbalized understanding and was ready for discharge.   P: Patient instructed to  medications in Pharmacy. Follow up as scheduled per discharge note.                           "

## 2022-11-09 NOTE — PLAN OF CARE
Goal Outcome Evaluation:  [id number]    Hypertensive. OVSS on RA. Labatolol given x1 with effect. Last /89 at 0630. BP goal 140/90.  . Denies pain but endorses intermittent dull headache. Denies chest pain. Denies nausea & SOB.

## 2022-11-09 NOTE — CONSULTS
Care Management Initial Consult    General Information  Assessment completed with: Patient (by phone)  Type of CM/SW Visit: Initial Assessment  Primary Care Provider verified and updated as needed: Yes   Readmission within the last 30 days:     Advance Care Planning: Advance Care Planning Reviewed: no concerns identified          Communication Assessment  Patient's communication style: spoken language (English or Bilingual)    Hearing Difficulty or Deaf: no   Wear Glasses or Blind: no    Cognitive  Cognitive/Neuro/Behavioral: WDL                      Living Environment:   People in home:  (wife and 3 kids)     Current living Arrangements: apartment      Able to return to prior arrangements: yes       Family/Social Support:  Care provided by: self  Provides care for: child(elsa)  Marital Status:     Description of Support System: Supportive, Involved   (family)             Current Resources:   Patient receiving home care services: No  Community Resources: OP Infusion ([id number]  Equipment currently used at home: grab bar, toilet, grab bar, tub/shower  Supplies currently used at home: None    Employment/Financial:  Employment Status: employed full-time     Financial Concerns: No concerns identified        Functional Status:  Prior to admission patient needed assistance:   Dependent ADLs:: Independent  Dependent IADLs:: Medication Management        Values/Beliefs:  Spiritual, Cultural Beliefs, Temple Practices, Values that affect care:  (not discussed)               Additional Information:  Initial assessment completed due to high risk readmission score. Has ride home with wife. Care management will follow for any discharge needs.          Carol Phillip RN, [place]  Float Care Coordinator  Covering 7D [facility]   Pager: [phone]

## 2022-11-09 NOTE — CARE PLAN
Occupational Therapy: Orders received. Chart reviewed and discussed with care team.? Occupational Therapy not indicated due to pt at ADL baseline, denies change in function. Pt reports no IP OT needs.? Defer discharge recommendations to medical team.? Will complete orders.

## 2022-11-09 NOTE — PROGRESS NOTES
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     Elevated BP throughout shift, labetalol x2, hydralazine x1, dull  headache @ 5/10, propranolol x1 for headache, BP parameter SBP </= 160, DBP </= 90, labetalol and hydralazine q4 if BP above parameters, A&O x4, up ad avni, RA sating WDL, regular diet, glucose ACHS, no insulin given this shift d/t values WDL, now switched from hep drip to Lovenox q12 hrs, Alecensa 300 mg  cap not given this shift d/t med not available-wife will bring from tomorrow, voiding spontaneously, LBM 11/07/22, no skin issues noted, continue with POC  "

## 2022-11-11 ENCOUNTER — PATIENT OUTREACH (OUTPATIENT)
Dept: CARE COORDINATION | Facility: CLINIC | Age: 42
End: 2022-11-11

## 2022-11-11 NOTE — PROGRESS NOTES
Clinic Care Coordination Contact  [facility]/Voicemail       Clinical Data: Care Coordinator Outreach  Outreach attempted x 2.  Left message on patient's voicemail with call back information and requested return call.  Plan: Care Coordinator will do no further outreaches at this time.    LAURENCE Ortega  [phone]  [facility]

## 2022-11-15 NOTE — PROGRESS NOTES
MEDICAL ONCOLOGY FOLLOW UP NOTE    PATIENT NAME: Connor Emerson  ENCOUNTER DATE: 11/16/2022    Care Team  Primary Oncologist: Bassam Persaud MD    REASON FOR CURRENT VISIT: F/u of lung cancer    HISTORY OF PRESENT ILLNESS:  Mr. Connor Emerson is a 42 year old  male who is a non-smoker with PMHx of T2DM, HTN with metastatic NSCLC comes for follow up     Oncologic Hx:    Diagnosis:   Stage IV NSCLC, Rt lung adenocarcinoma with metastasis to pleura, mediastinum , rt pleural effusion and brain diagnosed 1/2022 (AJCC 8th edition)  PD-L1 TPS 2-3% by [place]   NGS [facility] panel-EML4:ALK rearragement  NGS Guardant- GNAS R201H, KRAS K5E- No ALK    Treatment:   Current:  3/2/22- now- Alectinib 300 mg BID (Dose reduced to 450 mg BID from 600 mg BID due to grade 3 myalgias 3/21/22, again reduced to 300 mg BID 9/28/22)  9/28/22- Bevacizumab for radiation necrosis  )  Past:  2/15/22- GK to 11 briain lesions    Intent of treatment: Palliative    Oncologic course:  1/19/22 to 1/22/22-Admitted to [facility] for 2 week progressive SOB secondary to have large rt sided pleural effusion, needing thoracentesis x2 (1.7L and 2.0 L removed), cytology positive for malignancy, adenocarcinoma.   1/26/22- Rt pleural mass biopsy-Dr. Agrawal--POSITIVE FOR ADENOCARCINOMA CONSISTENT WITH LUNG PRIMARY, admixed with mesothelial hyperplasia and inflammatory infiltrate (+ TTF-1 and CK 7;  negative  p40, calretinin and WT-1. PAX8 immunostain focal +). 4th thoracentesis done simultaneously - 3L approx removed.   2/1/22- PET/CT-Right lower lobe central infiltrative FDG avid 8.2 x 9.6 cm mass representing a primary lung adenocarcinoma. Ipsilateral right perihilar, bilateral pretracheal, subcarinal and superior mediastinal michele metastases. Contralateral mildly FDG avid few lung nodules are suspicious for contralateral metastasis. At least 3 intracranial metastases in the right frontal lobe, left frontal lobe and left cerebellar hemisphere. Nonspecific mild diffuse  bone marrow uptake. Further evaluation with a spine MRI could be considered to rule out early marrow infiltration. This could also be seen with red marrow conversion.  2/5/22-  Brain MRI- At least 9 intracranial metastases as detailed above. The dominant lesions involving the orbital right frontal lobe, the posterior left middle frontal gyrus, anterior right temporal lobe and in the left cerebellar hemisphere have surrounding moderate vasogenic type edema.  2/15/22- Saw Dr. Arango from Rad Onc- Rcd GK to 12 lesion in bran  2/16/22- Pleurex placement   3/2/22- Started Alectinib 600 mg BID  3/21/22- Dose reduced to 450 mg BID due to grade 3 myalgias and fatigue  4/2/22 to 4/5/22- Admitted at [facility] for- Severe sepsis due to MSSA infection of right PleurX catheter s/p removal- He presented with onset of pain at tube site starting 4/1; at arrival was tachycardic with leukocytosis (22.7) and elevated lactic acid (2.9).  CT chest showed fluid and stranding tracking outside the pleural space into chest wall along pleural catheter.  IR was consulted and removed catheter 4/2 with report of pustular drainage and tip culture growing MSSA.  Thoracic Surg was consulted who felt no surgical indication necessary given minimal pleural fluid and lack of any signs of abscess.  Initially treated with broad spectrum coverage for sepsis, narrowed to Ancef once sensitivities returned with plan to transition to cefadroxil for an additional 10 days at discharge per ID. Held drug 4/2 to 4/11 5/2/22- CT CAP- Overall, positive response to therapy with decreased size of right lower lobe and right pleural-based masses, pulmonary metastases, hilar and mediastinal lymphadenopathy. However, a single right posterior pleural-based mass has slightly increased in size since 2/24/2022. No metastatic disease in the abdomen and pelvis. Right Pleurx catheter has been removed. Trace right pleural effusion and right basilar atelectasis.  5/2/22-  Brain MRI- The previously demonstrated brain metastases are mildly diminished in size versus to 2/5/2022. The degree of edema is also diminished but not completely resolved. Probable trace amounts of intralesional bleeding demonstrated on the gradient sequence within the metastases. No definite new metastasis or progressive mass effect. No hydrocephalus or infarct.    6/15/22 to 6/17/22- Admitted at [facility]-with aphasia and word finding difficulty over last few weeks.  He presented to [facility] ED on 6/10 for evaluation of his symptoms. MRI brain showed multiple intracranial metastases, with interval enlargement of the dominant lesion within the left frontal lobe and increased surrounding vasogenic edema with 2 mm rightward shift of the septum pellucidum. Due to his worsening anxiety, he left AMA. His symptoms continued to progress to where he could not write at work so he decided to go to the ED for re-evaluation and treatment. Evaluated by JATINDER, Rad Onc (radiaiton necrosis vs tumor progression).  6/16/22- MR Brain (6/16) shows multiple intracranial metastases, with interval enlargement  of the dominant lesion within the left frontal lobe and increased surrounding vasogenic edema with 2 mm rightward shift of the septum pellucidum.  6/16/22- - CT CAP shows slightly decreased size of right lower lobe and right pleural-based masses. No new pulmonary nodules or lymphadenopathy; No evidence of metastatic disease in the abdomen or pelvis.   6/28/22 to 6/30/22- Admitted at [facility]- Elective left Stealth craniotomy with resection of brain tumor due to ongoing symptoms. No intraoperative complications. EBL 50 ml.  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  7/5/22- Ct CAP- Right lower lobe low-density nodules are not significantly changed. A small left upper lobe pulmonary nodule is also unchanged. Trace pleural fluid on the right has increased slightly. No convincing evidence for metastatic disease in the abdomen or  pelvis.  7/18/22 to 7/19/22- Admitted to [facility] for seizure- Reportedly was only taking once Keppra instead of twice daily. Also resumed on dexamethasone 2 mg daily  8/1/22- Brain MRI- Redemonstrated postsurgical changes status post left frontoparietal Craniotomy. Interval increase in size of the dominant ring-enhancing lesion in the left posterior superior frontal lobe with increased moderate surrounding vasogenic edema and local mass effect resulting in narrowing of the supratentorial ventricular system. No significant midline shift/herniation at this time  8/1/22- Dex was increased to 4 mg daily by Dr. Moran  9/1/22- CT Chest- Near resolution of previously seen right pleural nodule. Stable right lower lobe pulmonary nodule  9/28/22- Bevacizumab for radiation necrosis  10/26/22- Bevacizumab   10/26/22- Ct CAP- Stable posterior medial right lower lobe 1.9 x 1.1 cm nodule series 8 image 176. Adjacent stable scarring and atelectasis. The previously noted pleural nodule posteriorly on the right is not currently clearly identified. Stable left upper lobe 0.3 cm nodule image 56  10/26/22- Brain MRI- Overall improved appearance of multiple intracranial metastases with near resolution of associated edema and diminished enhancement and size of multiple residual lesions. No definite new or progressive metastasis.  11/7/22 to 11/9/22- Admitted for PE and HTN urgency- Small pulmonary embolism in the right lower lobe pulmonary artery. started on Lovenox, Brain MRI neg for PRES.    He works as a maintenance manger for apartment complexes    Interval Hx:  Connor is here to follow up after recent hospital discharge. He feels much better now. He is improving   Anticoagulation is going well. No bleeding.   He started going back to work on Monday, able to work ok. , no limitations.  Currently on 300 mg BID of alectinib,   Headaches have resolved now, pleuritic chest pain has most resolved, has occasional pain now  Energy levels are  getting back to normal  Breathing is stable, no cough, no fever or chills.  He continues to have on and off body aches but overall stable, he has been off of his statin as well.  Able to eat and drink well  Independent of ADL and IADL      REVIEW OF SYSTEMS: 14 point ROS negative other than the symptoms noted above in the HPI.    Wt Readings from Last 4 Encounters:   11/09/22 94.9 kg (209 lb 4.8 oz)   11/07/22 99.1 kg (218 lb 8 oz)   10/26/22 98.3 kg (216 lb 11.2 oz)   10/18/22 99.1 kg (218 lb 6.4 oz)      Review of Systems:  A comprehensive ROS was performed and found to be negative or non-contributory with the exception of that noted in the HPI above.    Past Medical History:  GERD  Hypertension, not on medication  Type 2 diabetes mellitus, not on medications currently, previously on Metformin    Past Surgical History:  Past Surgical History:   Procedure Laterality Date     BRONCHOSCOPY RIGID OR FLEXIBLE W/TRANSENDOSCOPIC ENDOBRONCHIAL ULTRASOUND GUIDED Bilateral 1/26/2022    Procedure: Right BRONCHOSCOPY, FIBEROPTIC, endobronchial ultrasound, pleural biopsy;  Surgeon: Dallin Agrawal MD;  Location: [facility] OR     INJECT BLOCK MEDIAL BRANCH CERVICAL/THORACIC/LUMBAR       INSERT CHEST TUBE Right 2/16/2022    Procedure: INSERTION, CATHETER, INTERCOSTAL, FOR DRAINAGE;  Surgeon: Dallin Agrawal MD;  Location: [facility] GI     INSERT CHEST TUBE Right 3/9/2022    Procedure: INSERTION, CATHETER, INTERCOSTAL, FOR DRAINAGE;  Surgeon: Sushila Antonio MD;  Location: [facility] GI     IR CHEST TUBE REMOVAL TUNNELED RIGHT  4/2/2022     OPTICAL TRACKING SYSTEM CRANIOTOMY, EXCISE TUMOR, COMBINED Left 6/28/2022    Procedure: Left stealth craniotomy for tumor resection with motor mapping;  Surgeon: Stephen Moran MD;  Location:  OR     ORTHOPEDIC SURGERY      Ganesh. Rotator cuff repair.     PLEUROSCOPY N/A 1/26/2022    Procedure: Pleuroscopy with Pleural Biopsy;  Surgeon: Dallin Agrawal MD;  Location: [facility] OR       Social History:  Lives with  "wife and 4 kids in [place]. Works as a  for an apartment complex in [place]. Exposure to household chemicals and . No significant exposure to asbestos. No signal exposure to benzene or similar chemicals. No significant smoking history-states that he smoked 1 to 2 cigarettes occasionally per month for about 2 years in college, non-smoking since then. No significant alcohol use history. No other recreational substances. Good support system. Kids are 23, 19, 17 and 13.    Family History  Significant history for cancers on maternal side. Mother  of uterine cancer. 2 maternal uncles have possible metastatic melanoma.    Outpatient Medications:  Current Outpatient Medications   Medication     alcohol swab prep pads     alectinib (ALECENSA) 150 MG CAPS     blood glucose (NO BRAND SPECIFIED) test strip     blood glucose calibration (NO BRAND SPECIFIED) solution     blood glucose monitoring (NO BRAND SPECIFIED) meter device kit     citalopram (CELEXA) 20 MG tablet     enoxaparin ANTICOAGULANT (LOVENOX) 100 MG/ML syringe     hydrochlorothiazide (HYDRODIURIL) 25 MG tablet     insulin aspart (NOVOLOG PEN) 100 UNIT/ML pen     insulin glargine (LANTUS PEN) 100 UNIT/ML pen     insulin pen needle (31G X 8 MM) 31G X 8 MM miscellaneous     levETIRAcetam (KEPPRA) 1000 MG tablet     lisinopril (ZESTRIL) 40 MG tablet     metFORMIN (GLUCOPHAGE XR) 500 MG 24 hr tablet     methocarbamol (ROBAXIN) 500 MG tablet     oxyCODONE (ROXICODONE) 5 MG tablet     prochlorperazine (COMPAZINE) 10 MG tablet     propranolol (INDERAL) 20 MG tablet     thin (NO BRAND SPECIFIED) lancets     No current facility-administered medications for this visit.       Physical Exam:    Height 1.753 m (5' 9\"), weight 94.8 kg (209 lb).   General: alert and cooperative, sitting up in chair  HEENT: sclera anicteric, EOMI, MMM. Pupils equal and reactive.   Resp: breathing comfortably   GI: soft, non-tender, non-distended, bowel sounds " present and normoactive  MSK: warm and well-perfused, normal tone  Skin: no rashes on limited exam, no jaundice  Neuro: Alert awake and oriented x4, strength is 5 on 5 throughout, sensations are grossly intact    Labs & Studies: I personally reviewed the following studies:  Most Recent 3 CBC's:  Recent Labs   Lab Test 11/08/22  0618 11/07/22  1531 10/18/22  0833   WBC 8.4 11.4* 13.7*   HGB 13.6 14.3 14.4   MCV 96 92 94    195 213     Most Recent 3 BMP's:  Recent Labs   Lab Test 11/09/22  1104 11/09/22  0731 11/09/22  0208 11/08/22  2134 11/08/22  1708 11/08/22  1435 11/08/22  0813 11/08/22  0618 11/08/22  0145 11/07/22  1531 10/26/22  0832 10/18/22  0833   NA  --   --   --   --   --   --   --  140  --  143  --  144   POTASSIUM 3.4  --   --   --   --  3.5  --  3.2*  --  3.6   < > 3.2*   CHLORIDE  --   --   --   --   --   --   --  103  --  104  --  102   CO2  --   --   --   --   --   --   --  23  --  28  --  27   BUN  --   --   --   --   --   --   --  15.1  --  16.7  --  22.1*   CR  --   --   --   --   --   --   --  0.57*  --  0.68  --  0.51*   ANIONGAP  --   --   --   --   --   --   --  14  --  11  --  15   TORY  --   --   --   --   --   --   --  8.4*  --  9.6  --  9.0   GLC  --  158* 165* 155*   < >  --    < > 147*   < > 192*  --  151*    < > = values in this interval not displayed.    Most Recent 2 LFT's:  Recent Labs   Lab Test 11/07/22  1531 10/18/22  0833   AST 20 16   ALT 23 19   ALKPHOS 79 76   BILITOTAL 1.0 0.6    Most Recent TSH and T4:  Recent Labs   Lab Test 09/12/22  1642   TSH 2.56        ASSESSMENT AND PLAN:  Stage IV NSCLC, Rt lung adenocarcinoma with metastasis to pleura, mediastinum , rt pleural effusion and brain diagnosed 1/2022 (AJCC 8th edition)  PD-L1 TPS 2-3% by [place]   NGS [facility] panel-EML4:ALK rearragement; chr2:[id number], chr2:[id number]  NGS Guardant- GNAS R201H, KRAS K5E- No ALK    He began Alectinib 600 mg BID 3/2/22 and unfortunately developed grade 3 myalgias which have improved  with lowering the dose 450 mg BID. He was holding drug 4/2 to 4/11 due to MSSA infection from pleurex which is removed. Resumed at 450 mg BID. Initial CT with good VT in the chest. Had pleuretic right sided chest pain so CT chest on 9/1 which shows ongoing VT in chest. Due to ongoing myalgias (Although CK is normal), we dose reduced to 300 mg BID, will Continue alectinib 300 mg BID.He is doing well since his discharge from hospitalization back to work.  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        Plan   RTC with [place] in 1 month   CT and Brain MRI early Jan 2023   RTC with me after scans        # PE: provoked by malignancy vs bevacizumab  -New right-sided pleuritic chest pain, tachycardiam, CT showing rt sided sub segmental PE wo   -- Started on lovenox , will switch to DOAC todday, will start rivaroxiabn 20 mg daily, sent prescription waqas  -counselled on the transition from lovenox to Rivaroxiban      # Hypertensive urgency- resolved  # G3 diastolic HTN - led to one dose hold of bevacizumab and now discontinued  Adm with /111. No evidence of end-organ damage. Most likley from Bevacizumab. Recent MRI brain revealed no PRES, stable necrotic/mets areas and no new enhancement.  - PTA HTCZ  - PTA lisinopril   -On propranolol 20mg BID  - Pt to follow up with PCP in regards to BP mgmt       # Brain mets:  # Radiation necrosis  Baseline Brain MRI with several brain mets, s/p GK to 11-12 brain mets. F/u Brain MRI in June was showing enlargement of the one of the lesions along with edema, therefore had to undergo craniotomy followed by resection, the final biopsy consistent with radiation necrosis.  Had issues with radiation necrosis and swelling requiring steroids but these were driving up blood sugars drastically  -Began bevacizumab for radiation necrosis --15 mg/kg every 3 weeks, plan for at least 3 months of  therapy.  Dexamethasone taper through 10/31. S/p 2 doses of Bevacixumab now, last one 10/26   -Recent Brain MRI showing Rx effect and resolution of radiation necrosis, will have to watch closely given he is off of bevaciuzumab  Next Brain MRI 1/2023    # Type 2 Diabetes: Self monitoring of blood glucose is not at goal of fasting  mg/dL. Now off of dexamethasone. Started metformin 500mg ER every day    --Started using Zeus 2 continous glucose monitor  --FOllows medication management-   --on  Metformin and insulin  -- Novolog scale with correction--be sure to check blood sugars before eating and taking insulin with a meal       #grade 3 myalgias: CK has been normal. initially resolved after lowering dose though Connor recalls it has never really improved. ?unclear etiology?  Monitor with reduced 300 mg BID dose  -off of rosuvastatin now    #hypokalemia: mild and he denies PO intake limitations thus will recheck with next weeks labs that are already scheduled    #normocytic anemia: sudden drop to <7, requiring 1 unit blood transfusion. Lab work up unrevealing. Has recovered to baseline  -consider EGD if hgb drops again, risk for GI bleed with chronic steroid use      #grade 2 fatigue: ongoing. Monitor any improvement with dose reduction     #MSSA infection, Sepsis at pleurex site- resolved  # Pleural effusion: s/p pleurex palcement  2/16/22. Then on 4/2 right PleurX catheter s/p removal for severe sepsis due to MSSA infection.    #dry eyes  #blurred vision  Continue artificial tears. Saw ophthalmology previosuly, changed prescription, mow improved.  No neuro sx.     #Psychiatry  # Situational Anxiety   - irritable at times, may be better off steroids. Declined referral to therapist and denies medication intervention for now. PCP can help manage this too if he has established relationship with him    #COVID vaccine: No COVID vaccine on record, did not discuss today.       On the day of service  Chart review: 5  minutes  Visit duration: 30 minutes  Care coordination: 5 minutes    Bassam Persaud MD    Hematology, Oncology and Transplantation      Connor is a 42 year old who is being evaluated via a billable video visit.      How would you like to obtain your AVS? MyChart  If the video visit is dropped, the invitation should be resent by: Send to e-mail at: [email or web address]  Will anyone else be joining your video visit? Whit Hancock    Video-Visit Details    Video Start Time: 10:46 AM    Type of service:  Video Visit    Video End Time:10:46 AM    Originating Location (pt. Location): Home        Distant Location (provider location):  On-site    Platform used for Video Visit: Well

## 2022-11-16 ENCOUNTER — VIRTUAL VISIT (OUTPATIENT)
Dept: ONCOLOGY | Facility: CLINIC | Age: 42
End: 2022-11-16
Attending: STUDENT IN AN ORGANIZED HEALTH CARE EDUCATION/TRAINING PROGRAM
Payer: COMMERCIAL

## 2022-11-16 VITALS — WEIGHT: 209 LBS | HEIGHT: 69 IN | BODY MASS INDEX: 30.96 KG/M2

## 2022-11-16 DIAGNOSIS — C79.31 BRAIN METASTASIS: ICD-10-CM

## 2022-11-16 DIAGNOSIS — C34.31 PRIMARY MALIGNANT NEOPLASM OF RIGHT LOWER LOBE OF LUNG (H): ICD-10-CM

## 2022-11-16 DIAGNOSIS — I26.99 PULMONARY EMBOLISM, UNSPECIFIED CHRONICITY, UNSPECIFIED PULMONARY EMBOLISM TYPE, UNSPECIFIED WHETHER ACUTE COR PULMONALE PRESENT (H): Primary | ICD-10-CM

## 2022-11-16 PROCEDURE — G0463 HOSPITAL OUTPT CLINIC VISIT: HCPCS | Mod: PN,RTG | Performed by: STUDENT IN AN ORGANIZED HEALTH CARE EDUCATION/TRAINING PROGRAM

## 2022-11-16 PROCEDURE — 99215 OFFICE O/P EST HI 40 MIN: CPT | Mod: 95 | Performed by: STUDENT IN AN ORGANIZED HEALTH CARE EDUCATION/TRAINING PROGRAM

## 2022-11-16 ASSESSMENT — PAIN SCALES - GENERAL: PAINLEVEL: MODERATE PAIN (4)

## 2022-11-16 NOTE — LETTER
11/16/2022         RE: Connor Emerson  [address]  [facility] [address]        Dear Colleague,    Thank you for referring your patient, Connor Emerson, to the [facility]. Please see a copy of my visit note below.    MEDICAL ONCOLOGY FOLLOW UP NOTE    PATIENT NAME: Connor Emerson  ENCOUNTER DATE: 11/16/2022    Care Team  Primary Oncologist: Bassam Persaud MD    REASON FOR CURRENT VISIT: F/u of lung cancer    HISTORY OF PRESENT ILLNESS:  Mr. Connor Emerson is a 42 year old  male who is a non-smoker with PMHx of T2DM, HTN with metastatic NSCLC comes for follow up     Oncologic Hx:    Diagnosis:   Stage IV NSCLC, Rt lung adenocarcinoma with metastasis to pleura, mediastinum , rt pleural effusion and brain diagnosed 1/2022 (AJCC 8th edition)  PD-L1 TPS 2-3% by [place]   NGS [facility] panel-EML4:ALK rearragement  NGS Guardant- GNAS R201H, KRAS K5E- No ALK    Treatment:   Current:  3/2/22- now- Alectinib 300 mg BID (Dose reduced to 450 mg BID from 600 mg BID due to grade 3 myalgias 3/21/22, again reduced to 300 mg BID 9/28/22)  9/28/22- Bevacizumab for radiation necrosis  )  Past:  2/15/22- GK to 11 briain lesions    Intent of treatment: Palliative    Oncologic course:  1/19/22 to 1/22/22-Admitted to [facility] for 2 week progressive SOB secondary to have large rt sided pleural effusion, needing thoracentesis x2 (1.7L and 2.0 L removed), cytology positive for malignancy, adenocarcinoma.   1/26/22- Rt pleural mass biopsy-Dr. Agrawal--POSITIVE FOR ADENOCARCINOMA CONSISTENT WITH LUNG PRIMARY, admixed with mesothelial hyperplasia and inflammatory infiltrate (+ TTF-1 and CK 7;  negative  p40, calretinin and WT-1. PAX8 immunostain focal +). 4th thoracentesis done simultaneously - 3L approx removed.   2/1/22- PET/CT-Right lower lobe central infiltrative FDG avid 8.2 x 9.6 cm mass representing a primary lung adenocarcinoma. Ipsilateral right perihilar, bilateral pretracheal, subcarinal and superior  mediastinal michele metastases. Contralateral mildly FDG avid few lung nodules are suspicious for contralateral metastasis. At least 3 intracranial metastases in the right frontal lobe, left frontal lobe and left cerebellar hemisphere. Nonspecific mild diffuse bone marrow uptake. Further evaluation with a spine MRI could be considered to rule out early marrow infiltration. This could also be seen with red marrow conversion.  2/5/22-  Brain MRI- At least 9 intracranial metastases as detailed above. The dominant lesions involving the orbital right frontal lobe, the posterior left middle frontal gyrus, anterior right temporal lobe and in the left cerebellar hemisphere have surrounding moderate vasogenic type edema.  2/15/22- Saw Dr. Arango from Rad Onc- Rcd GK to 12 lesion in bran  2/16/22- Pleurex placement   3/2/22- Started Alectinib 600 mg BID  3/21/22- Dose reduced to 450 mg BID due to grade 3 myalgias and fatigue  4/2/22 to 4/5/22- Admitted at [facility] for- Severe sepsis due to MSSA infection of right PleurX catheter s/p removal- He presented with onset of pain at tube site starting 4/1; at arrival was tachycardic with leukocytosis (22.7) and elevated lactic acid (2.9).  CT chest showed fluid and stranding tracking outside the pleural space into chest wall along pleural catheter.  IR was consulted and removed catheter 4/2 with report of pustular drainage and tip culture growing MSSA.  Thoracic Surg was consulted who felt no surgical indication necessary given minimal pleural fluid and lack of any signs of abscess.  Initially treated with broad spectrum coverage for sepsis, narrowed to Ancef once sensitivities returned with plan to transition to cefadroxil for an additional 10 days at discharge per ID. Held drug 4/2 to 4/11 5/2/22- CT CAP- Overall, positive response to therapy with decreased size of right lower lobe and right pleural-based masses, pulmonary metastases, hilar and mediastinal lymphadenopathy.  However, a single right posterior pleural-based mass has slightly increased in size since 2/24/2022. No metastatic disease in the abdomen and pelvis. Right Pleurx catheter has been removed. Trace right pleural effusion and right basilar atelectasis.  5/2/22- Brain MRI- The previously demonstrated brain metastases are mildly diminished in size versus to 2/5/2022. The degree of edema is also diminished but not completely resolved. Probable trace amounts of intralesional bleeding demonstrated on the gradient sequence within the metastases. No definite new metastasis or progressive mass effect. No hydrocephalus or infarct.    6/15/22 to 6/17/22- Admitted at [facility]-with aphasia and word finding difficulty over last few weeks.  He presented to [facility] ED on 6/10 for evaluation of his symptoms. MRI brain showed multiple intracranial metastases, with interval enlargement of the dominant lesion within the left frontal lobe and increased surrounding vasogenic edema with 2 mm rightward shift of the septum pellucidum. Due to his worsening anxiety, he left AMA. His symptoms continued to progress to where he could not write at work so he decided to go to the ED for re-evaluation and treatment. Evaluated by NSGY, Rad Onc (radiaiton necrosis vs tumor progression).  6/16/22- MR Brain (6/16) shows multiple intracranial metastases, with interval enlargement  of the dominant lesion within the left frontal lobe and increased surrounding vasogenic edema with 2 mm rightward shift of the septum pellucidum.  6/16/22- - CT CAP shows slightly decreased size of right lower lobe and right pleural-based masses. No new pulmonary nodules or lymphadenopathy; No evidence of metastatic disease in the abdomen or pelvis.   6/28/22 to 6/30/22- Admitted at [facility]- Elective left Stealth craniotomy with resection of brain tumor due to ongoing symptoms. No intraoperative complications. EBL 50 ml.  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  7/5/22-  Ct CAP- Right lower lobe low-density nodules are not significantly changed. A small left upper lobe pulmonary nodule is also unchanged. Trace pleural fluid on the right has increased slightly. No convincing evidence for metastatic disease in the abdomen or pelvis.  7/18/22 to 7/19/22- Admitted to [facility] for seizure- Reportedly was only taking once Keppra instead of twice daily. Also resumed on dexamethasone 2 mg daily  8/1/22- Brain MRI- Redemonstrated postsurgical changes status post left frontoparietal Craniotomy. Interval increase in size of the dominant ring-enhancing lesion in the left posterior superior frontal lobe with increased moderate surrounding vasogenic edema and local mass effect resulting in narrowing of the supratentorial ventricular system. No significant midline shift/herniation at this time  8/1/22- Dex was increased to 4 mg daily by Dr. Moran  9/1/22- CT Chest- Near resolution of previously seen right pleural nodule. Stable right lower lobe pulmonary nodule  9/28/22- Bevacizumab for radiation necrosis  10/26/22- Bevacizumab   10/26/22- Ct CAP- Stable posterior medial right lower lobe 1.9 x 1.1 cm nodule series 8 image 176. Adjacent stable scarring and atelectasis. The previously noted pleural nodule posteriorly on the right is not currently clearly identified. Stable left upper lobe 0.3 cm nodule image 56  10/26/22- Brain MRI- Overall improved appearance of multiple intracranial metastases with near resolution of associated edema and diminished enhancement and size of multiple residual lesions. No definite new or progressive metastasis.  11/7/22 to 11/9/22- Admitted for PE and HTN urgency- Small pulmonary embolism in the right lower lobe pulmonary artery. started on Lovenox, Brain MRI neg for PRES.    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 He feels much better now. He is improving   Anticoagulation is  going well. No bleeding.   He started going back to work on Monday, able to work ok. , no limitations.  Currently on 300 mg BID of alectinib,   Headaches have resolved now, pleuritic chest pain has most resolved, has occasional pain now  Energy levels are getting back to normal  Breathing is stable, no cough, no fever or chills.  He continues to have on and off body aches but overall stable, he has been off of his statin as well.  Able to eat and drink well  Independent of ADL and IADL      REVIEW OF SYSTEMS: 14 point ROS negative other than the symptoms noted above in the HPI.    Wt Readings from Last 4 Encounters:   11/09/22 94.9 kg (209 lb 4.8 oz)   11/07/22 99.1 kg (218 lb 8 oz)   10/26/22 98.3 kg (216 lb 11.2 oz)   10/18/22 99.1 kg (218 lb 6.4 oz)      Review of Systems:  A comprehensive ROS was performed and found to be negative or non-contributory with the exception of that noted in the HPI above.    Past Medical History:  GERD  Hypertension, not on medication  Type 2 diabetes mellitus, not on medications currently, previously on Metformin    Past Surgical History:  Past Surgical History:   Procedure Laterality Date     BRONCHOSCOPY RIGID OR FLEXIBLE W/TRANSENDOSCOPIC ENDOBRONCHIAL ULTRASOUND GUIDED Bilateral 1/26/2022    Procedure: Right BRONCHOSCOPY, FIBEROPTIC, endobronchial ultrasound, pleural biopsy;  Surgeon: Dallin Agrawal MD;  Location: [facility] OR     INJECT BLOCK MEDIAL BRANCH CERVICAL/THORACIC/LUMBAR       INSERT CHEST TUBE Right 2/16/2022    Procedure: INSERTION, CATHETER, INTERCOSTAL, FOR DRAINAGE;  Surgeon: Dallin Agrawal MD;  Location: [facility] GI     INSERT CHEST TUBE Right 3/9/2022    Procedure: INSERTION, CATHETER, INTERCOSTAL, FOR DRAINAGE;  Surgeon: Sushila Antonio MD;  Location: [facility] GI     IR CHEST TUBE REMOVAL TUNNELED RIGHT  4/2/2022     OPTICAL TRACKING SYSTEM CRANIOTOMY, EXCISE TUMOR, COMBINED Left 6/28/2022    Procedure: Left stealth craniotomy for tumor resection with motor mapping;   Surgeon: Stephen Moran MD;  Location:  OR     ORTHOPEDIC SURGERY      Ganesh. Rotator cuff repair.     PLEUROSCOPY N/A 2022    Procedure: Pleuroscopy with Pleural Biopsy;  Surgeon: Dallin Agrawal MD;  Location:  OR       Social History:  Lives with wife and 4 kids in [place]. Works as a  for an apartment complex in [place]. Exposure to household chemicals and . No significant exposure to asbestos. No signal exposure to benzene or similar chemicals. No significant smoking history-states that he smoked 1 to 2 cigarettes occasionally per month for about 2 years in college, non-smoking since then. No significant alcohol use history. No other recreational substances. Good support system. Kids are 23, 19, 17 and 13.    Family History  Significant history for cancers on maternal side. Mother  of uterine cancer. 2 maternal uncles have possible metastatic melanoma.    Outpatient Medications:  Current Outpatient Medications   Medication     alcohol swab prep pads     alectinib (ALECENSA) 150 MG CAPS     blood glucose (NO BRAND SPECIFIED) test strip     blood glucose calibration (NO BRAND SPECIFIED) solution     blood glucose monitoring (NO BRAND SPECIFIED) meter device kit     citalopram (CELEXA) 20 MG tablet     enoxaparin ANTICOAGULANT (LOVENOX) 100 MG/ML syringe     hydrochlorothiazide (HYDRODIURIL) 25 MG tablet     insulin aspart (NOVOLOG PEN) 100 UNIT/ML pen     insulin glargine (LANTUS PEN) 100 UNIT/ML pen     insulin pen needle (31G X 8 MM) 31G X 8 MM miscellaneous     levETIRAcetam (KEPPRA) 1000 MG tablet     lisinopril (ZESTRIL) 40 MG tablet     metFORMIN (GLUCOPHAGE XR) 500 MG 24 hr tablet     methocarbamol (ROBAXIN) 500 MG tablet     oxyCODONE (ROXICODONE) 5 MG tablet     prochlorperazine (COMPAZINE) 10 MG tablet     propranolol (INDERAL) 20 MG tablet     thin (NO BRAND SPECIFIED) lancets     No current facility-administered medications for this visit.  "      Physical Exam:    Height 1.753 m (5' 9\"), weight 94.8 kg (209 lb).   General: alert and cooperative, sitting up in chair  HEENT: sclera anicteric, EOMI, MMM. Pupils equal and reactive.   Resp: breathing comfortably   GI: soft, non-tender, non-distended, bowel sounds present and normoactive  MSK: warm and well-perfused, normal tone  Skin: no rashes on limited exam, no jaundice  Neuro: Alert awake and oriented x4, strength is 5 on 5 throughout, sensations are grossly intact    Labs & Studies: I personally reviewed the following studies:  Most Recent 3 CBC's:  Recent Labs   Lab Test 11/08/22  0618 11/07/22  1531 10/18/22  0833   WBC 8.4 11.4* 13.7*   HGB 13.6 14.3 14.4   MCV 96 92 94    195 213     Most Recent 3 BMP's:  Recent Labs   Lab Test 11/09/22  1104 11/09/22  0731 11/09/22  0208 11/08/22  2134 11/08/22  1708 11/08/22  1435 11/08/22  0813 11/08/22  0618 11/08/22  0145 11/07/22  1531 10/26/22  0832 10/18/22  0833   NA  --   --   --   --   --   --   --  140  --  143  --  144   POTASSIUM 3.4  --   --   --   --  3.5  --  3.2*  --  3.6   < > 3.2*   CHLORIDE  --   --   --   --   --   --   --  103  --  104  --  102   CO2  --   --   --   --   --   --   --  23  --  28  --  27   BUN  --   --   --   --   --   --   --  15.1  --  16.7  --  22.1*   CR  --   --   --   --   --   --   --  0.57*  --  0.68  --  0.51*   ANIONGAP  --   --   --   --   --   --   --  14  --  11  --  15   TORY  --   --   --   --   --   --   --  8.4*  --  9.6  --  9.0   GLC  --  158* 165* 155*   < >  --    < > 147*   < > 192*  --  151*    < > = values in this interval not displayed.    Most Recent 2 LFT's:  Recent Labs   Lab Test 11/07/22  1531 10/18/22  0833   AST 20 16   ALT 23 19   ALKPHOS 79 76   BILITOTAL 1.0 0.6    Most Recent TSH and T4:  Recent Labs   Lab Test 09/12/22  1642   TSH 2.56        ASSESSMENT AND PLAN:  Stage IV NSCLC, Rt lung adenocarcinoma with metastasis to pleura, mediastinum , rt pleural effusion and brain diagnosed " 1/2022 (AJCC 8th edition)  PD-L1 TPS 2-3% by [place]   NGS [facility] panel-EML4:ALK rearragement; chr2:[id number], chr2:[id number]  NGS Guardant- GNAS R201H, KRAS K5E- No ALK    He began Alectinib 600 mg BID 3/2/22 and unfortunately developed grade 3 myalgias which have improved with lowering the dose 450 mg BID. He was holding drug 4/2 to 4/11 due to MSSA infection from pleurex which is removed. Resumed at 450 mg BID. Initial CT with good CA in the chest. Had pleuretic right sided chest pain so CT chest on 9/1 which shows ongoing CA in chest. Due to ongoing myalgias (Although CK is normal), we dose reduced to 300 mg BID, will Continue alectinib 300 mg BID.He is doing well since his discharge from hospitalization back to work.  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        Plan   RTC with [facility] in 1 month   CT and Brain MRI early Jan 2023   RTC with me after scans        # PE: provoked by malignancy vs bevacizumab  -New right-sided pleuritic chest pain, tachycardiam, CT showing rt sided sub segmental PE wo   -- Started on lovenox , will switch to DOAC todday, will start rivaroxiabn 20 mg daily, sent prescription waqas  -counselled on the transition from lovenox to Rivaroxiban      # Hypertensive urgency- resolved  # G3 diastolic HTN - led to one dose hold of bevacizumab and now discontinued  Adm with /111. No evidence of end-organ damage. Most likley from Bevacizumab. Recent MRI brain revealed no PRES, stable necrotic/mets areas and no new enhancement.  - PTA HTCZ  - PTA lisinopril   -On propranolol 20mg BID  - Pt to follow up with PCP in regards to BP mgmt       # Brain mets:  # Radiation necrosis  Baseline Brain MRI with several brain mets, s/p GK to 11-12 brain mets. F/u Brain MRI in June was showing enlargement of the one of the lesions along with edema, therefore had to undergo craniotomy followed by resection,  the final biopsy consistent with radiation necrosis.  Had issues with radiation necrosis and swelling requiring steroids but these were driving up blood sugars drastically  -Began bevacizumab for radiation necrosis --15 mg/kg every 3 weeks, plan for at least 3 months of therapy.  Dexamethasone taper through 10/31. S/p 2 doses of Bevacixumab now, last one 10/26   -Recent Brain MRI showing Rx effect and resolution of radiation necrosis, will have to watch closely given he is off of bevaciuzumab  Next Brain MRI 1/2023    # Type 2 Diabetes: Self monitoring of blood glucose is not at goal of fasting  mg/dL. Now off of dexamethasone. Started metformin 500mg ER every day    --Started using Nirvanix 2 continous glucose monitor  --FOllows medication management-   --on  Metformin and insulin  -- Novolog scale with correction--be sure to check blood sugars before eating and taking insulin with a meal       #grade 3 myalgias: CK has been normal. initially resolved after lowering dose though Connor recalls it has never really improved. ?unclear etiology?  Monitor with reduced 300 mg BID dose  -off of rosuvastatin now    #hypokalemia: mild and he denies PO intake limitations thus will recheck with next weeks labs that are already scheduled    #normocytic anemia: sudden drop to <7, requiring 1 unit blood transfusion. Lab work up unrevealing. Has recovered to baseline  -consider EGD if hgb drops again, risk for GI bleed with chronic steroid use      #grade 2 fatigue: ongoing. Monitor any improvement with dose reduction     #MSSA infection, Sepsis at pleurex site- resolved  # Pleural effusion: s/p pleurex palcement  2/16/22. Then on 4/2 right PleurX catheter s/p removal for severe sepsis due to MSSA infection.    #dry eyes  #blurred vision  Continue artificial tears. Saw ophthalmology previosuly, changed prescription, mow improved.  No neuro sx.     #Psychiatry  # Situational Anxiety   - irritable at times, may be better off  steroids. Declined referral to therapist and denies medication intervention for now. PCP can help manage this too if he has established relationship with him    #COVID vaccine: No COVID vaccine on record, did not discuss today.       On the day of service  Chart review: 5 minutes  Visit duration: 30 minutes  Care coordination: 5 minutes      Connor is a 42 year old who is being evaluated via a billable video visit.      How would you like to obtain your AVS? MyChart  If the video visit is dropped, the invitation should be resent by: Send to e-mail at: [email or web address]  Will anyone else be joining your video visit? No            Again, thank you for allowing me to participate in the care of your patient.      Sincerely,    Bassam Persaud MD

## 2022-11-17 ENCOUNTER — TELEPHONE (OUTPATIENT)
Dept: PEDIATRICS | Facility: CLINIC | Age: 42
End: 2022-11-17

## 2022-11-17 ENCOUNTER — MYC REFILL (OUTPATIENT)
Dept: ONCOLOGY | Facility: CLINIC | Age: 42
End: 2022-11-17

## 2022-11-17 DIAGNOSIS — Z79.4 TYPE 2 DIABETES MELLITUS WITHOUT COMPLICATION, WITH LONG-TERM CURRENT USE OF INSULIN (H): Primary | ICD-10-CM

## 2022-11-17 DIAGNOSIS — C34.31 MALIGNANT NEOPLASM OF LOWER LOBE OF RIGHT LUNG (H): ICD-10-CM

## 2022-11-17 DIAGNOSIS — C79.31 BRAIN METASTASIS: ICD-10-CM

## 2022-11-17 DIAGNOSIS — E11.9 TYPE 2 DIABETES MELLITUS WITHOUT COMPLICATION, WITH LONG-TERM CURRENT USE OF INSULIN (H): Primary | ICD-10-CM

## 2022-11-17 NOTE — TELEPHONE ENCOUNTER
Calling to schedule MTM visit, he reports his Mohan 2 sensors have not been refilled for some reason.  He states is blood sugars are good 127, 136.  He was recently in the hospital for blood clot.  Sent Mohan 2 continous glucose sensorts to [facility] pharmacy and scheduled appointment 11/22 to see MTM in person to check blood pressure.

## 2022-11-18 RX ORDER — LEVETIRACETAM 1000 MG/1
1000 TABLET ORAL 2 TIMES DAILY
Qty: 60 TABLET | Refills: 0 | Status: SHIPPED | OUTPATIENT
Start: 2022-11-18 | End: 2022-12-18

## 2022-11-18 NOTE — TELEPHONE ENCOUNTER
Keppra Refill   Last prescribing provider: Dr Persaud    Last clinic visit date: 11/16/22 DR Persaud     Recommendations for requested medication (if none, N/A): N/A    Any other pertinent information (if none, N/A): N/A    Refilled: Y/N, if NO, why?

## 2022-11-20 ENCOUNTER — HEALTH MAINTENANCE LETTER (OUTPATIENT)
Age: 42
End: 2022-11-20

## 2022-11-21 DIAGNOSIS — C34.31 MALIGNANT NEOPLASM OF LOWER LOBE OF RIGHT LUNG (H): Primary | ICD-10-CM

## 2022-11-21 NOTE — PROGRESS NOTES
"Medication Therapy Management (MTM) Encounter    ASSESSMENT:                            Medication Adherence/Access: See below for considerations    Pain:  Uncertain etiology for pain, appreciate oncology to evalute and also set up next available provider visit with Radha Shetty CNP.  Per Anna-comp/Uptodate on alectinib: \"Neuromuscular & skeletal: Asthenia (?41%), back pain (12%), increased creatine phosphokinase in blood specimen (37% to 43%), musculoskeletal pain (?29%), myalgia (?29%)\"     Pulmonary embolism: see Lexicomp dosing recommendation for Xarelto as \"15 mg twice daily with food for 21 days followed by 20 mg once daily with food.\"  Will verify with oncology about stopping Lovenox and starting Xarelto at 20mg every day dosing    History of metastatic lung cancer, multiple brain metastases: due to make oncology follow-up appointment, placed future CBC order per hospital discharge recommendations    Type 2 Diabetes: Patient is not meeting A1c goal of < 7%. Self monitoring of blood glucose is not at goal of fasting  mg/dL.  He would benefit from holding Lantus as he is taking this as needed and increasing metformin if tolerated.  Recommend to use continous glucose monitor and to re-evaluate need for long acting insulin based on results.    Hyperlipidemia: Patient is not on moderate intensity statin which is indicated based on 2019 ACC/AHA guidelines for lipid management.  Holding until myalgias are resolved or etiology determined.    Hypertension/headaches: Patient is meeting blood pressure goal of < 130/80mmHg. Refilled propranolol, continue this medication, no headaches since starting    Depression: improved.    Vaccines:  Discussed oncology is okay with him getting vaccines and he is high risk with being immunocompromised,  Recommended getting when able, previously discussed this.  Due for COVID-19, Prevnar-20, Tdap, Hepatitis B vaccines.  Per CDC \"Vaccines might be less effective during the " "period of altered immunocompetence. Non-live vaccines might best be deferred during a period of altered immunocompetence; in this circumstance, the concern is with effectiveness and not safety. Additionally, if a non-live vaccine is administered during the period of altered immunocompetence, it might need to be repeated after immune function has improved.\"    PLAN:                            Diabetes:  1.  Hold Lantus for now  2.  Can continue to take Novolog before meals the corrective dose  3.  On Sunday increase metformin to 1500mg (3 tablets) per day.  4.  Start using Econ[employer] 2 continous glucose monitor    High Blood pressure: great job  BP Readings from Last 3 Encounters:   11/22/22 100/70   11/09/22 (!) 135/92   11/07/22 (!) 161/116     Lung clot:  Xarelto start 20 mg once daily with food.  I will check dosing with oncology.  You would stop the Lovenox.    Future visit: we schedule with your doctor Radha Lowery for next year (next available time) and ordered CBC today to be done in the future per discharge instructions    Follow-up: Return in about 2 weeks (around 12/6/2022) for MTM Pharmacist Visit, phone visit.    SUBJECTIVE/OBJECTIVE:                          Connor Emerson is a 42 year old male coming in for a follow-up visit.  Today's visit is a follow-up MTM visit from 10/27/22.  He was discharged from [facility] 11/9/22 for pulmonary embolism.    Reason for visit: blood pressure and diabetes check.  Continues to have hip pain, really sore.    Allergies/ADRs: Reviewed in chart  Past Medical History: Reviewed in chart  Tobacco: He reports that he has never smoked. He has never used smokeless tobacco.  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  His wife sets them up for him.  He does feel like he is taking a lot of pills.  Per patient, misses medication 0 " times per week.   Medication barriers: none.   The patient fills medications at [facility]: No, he goes to [facility].    Pain:  Reporting pain in his hips and overall pain and reduced appetite. He is having difficulty walking today.  Pain is progressively worsening.  Discontinue rosuvastatin as likely cause but continues to have pain.  His alectinib dose was also reduced.  He has not had any headaches since being discharged from the hospital. Current medication is oxycodone 5mg twice daily as needed, methocarbamol 500mg at bedtime.  He took 2 oxycodone yesterday and plans to take 2 tablets today.  Side effects: decreased appetite, some stomach pain, no constipation    Pulmonary embolism:  Current medication is Lovenox injecitons 100mg twice daily.  He has not started the Xarelto 20mg every day which was changed from Lovenox 1mg/kg twice daily  11/16 by Dr. Palumbo, Oncology.  Side effects: bruising at injection site.    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  Next brain MRI 1/2023  Finished dexamethasone 1mg every day and a few left (cutting the 4mg dose in 1/4), will stop soon he states probably by the end of the week,   Side effects:lost appetite, some stomach pain, hip pain and myalgias  Certified for Medical Marijuana 9/13/22  Following up with Neurosurgery Dr. Moran and follows with Oncology Dr. Persaud      Hemoglobin   Date Value Ref Range Status   11/08/2022 13.6 13.3 - 17.7 g/dL Final   04/27/2017 15.2 13.3 - 17.7 g/dL Final   ]    Depression:  Current medications include: Citalopram 20mg once daily. Patient reports that depression symptoms are improved since starting, he states his wife can notice the difference of him taking this one.  He is having less angry outbursts.    Type 2 Diabetes:   "Currently taking metformin 2 of the 1000mg ER once daily, just started last Sunday.  Continues to get some upset stomach and some diarrhea but now this is settling and he thinks he can increase the metformin dose.  Lantus 36 units daily as needed, he is taking if blood sugars are 180 or above and if good numbers 150 he does not take.  Maybe taking Lantus twice a week.    Novolog adjustment scale 1 unit per 20 over 150 mg/dL, he does not take this often and takes the corrective dose as needed when he checks his blood sugars morning and night and before eating lunch.    Side effects: metformin-nausea/upset stomach for 1 week in the past.  Blood sugars from continous glucose monitor is different than his glucometer (runs lower)  Blood sugar monitoring: he has not been using his continous glucose monitor.  [facility] and he does not have his glucometer with him today  150 this morning and 130 yesterday morning, 180 last night  Will place on continous glucose monitor  [place] before work and lunch and dinner not big appetite.  Hypoglycemia: reporting none  Hyperglycemia: none  Eye exam: done ~May  Foot exam: due  Aspirin: No  Statin: No   ACEi/ARB: No.   Urine Albumin:   Lab Results   Component Value Date    UCRR 23 02/24/2022    MICROL <5 02/24/2022    UMALCR  02/24/2022      Comment:      Unable to calculate:  Urine creatinine or albumin value below detectable level     Lab Results   Component Value Date    A1C 9.5 11/08/2022    A1C 12.3 06/24/2022    A1C 10.1 01/19/2022    A1C 11.9 05/12/2020    A1C 6.3 08/10/2018    A1C 6.5 11/27/2017    A1C 6.4 06/29/2017    A1C 6.5 02/13/2017       Estimated Creatinine Clearance: 192.5 mL/min (A) (based on SCr of 0.57 mg/dL (L)).    Estimated body mass index is 32.46 kg/m  as calculated from the following:    Height as of 11/16/22: 5' 9\" (1.753 m).    Weight as of this encounter: 219 lb 12.8 oz (99.7 kg).    Component      Latest Ref Rng & Units 9/29/2015   C-Peptide      0.9 - 6.9 " ng/mL 6.4     Hypertension: Current medications include   hydrochlorothiazide 25mg every day,   lisinopril 40mg every day,  Propranolol 20mg twice daily (started for headaches)  Patient does self-monitor blood pressure, but not lately.  Patient reports the following medication side effects: low potassium.  BP Readings from Last 3 Encounters:   11/22/22 100/70   11/09/22 (!) 135/92   11/07/22 (!) 161/116     Potassium   Date Value Ref Range Status   11/09/2022 3.4 3.4 - 5.3 mmol/L Final     Comment:     Specimen slightly hemolyzed, potassium may be falsely elevated.     09/13/2022 4.2 3.4 - 5.3 mmol/L Final   05/12/2020 3.9 3.4 - 5.3 mmol/L Final       Hyperlipidemia: Current therapy includes none, rosuvastatin on hold due myalgias, these symptoms are continuing, he is not sure it was the rosuvastatin causing the pain, progressively getting wore. Could be attributed to alectinib or statin.  Recent Labs   Lab Test 02/24/22  1224 08/10/18  1110 11/11/16  1051 09/29/15  1604   CHOL 208* 182   < > 184   HDL 33* 36*   < > 33*   * 121*   < > 110   TRIG 338* 126   < > 204*   CHOLHDLRATIO  --   --   --  5.6*    < > = values in this interval not displayed.     Vaccines:  Most Recent Immunizations   Administered Date(s) Administered     HepA-Adult 04/10/2014     HepB-Adult 04/10/2014     Influenza (IIV3) PF 11/03/2010     Influenza Vaccine >6 months (Alfuria,Fluzone) 10/06/2015     Pneumococcal 23 valent 10/06/2015     TDAP Vaccine (Adacel) 12/01/2011     Td (Adult), Adsorbed 12/27/2004     Today's Vitals: /70   Pulse 62   Wt 219 lb 12.8 oz (99.7 kg)   SpO2 97%   BMI 32.46 kg/m    ----------------  Post Discharge Medication Reconciliation Status: discharge medications reconciled and changed, per note/orders.    I spent 40 minutes with this patient today. All changes were made via collaborative practice agreement with JERRY Alicia Ra, CNP. A copy of the visit note was provided to the patient's  provider(s).    The patient was given a summary of these recommendations.     Ester Carl, PharmD [facility]  Medication Therapy Management Practitioner   #[phone]     Medication Therapy Recommendations  Pulmonary embolism (H)    Current Medication: rivaroxaban ANTICOAGULANT (XARELTO) 20 MG TABS tablet   Rationale: Dose too low - Dosage too low - Effectiveness   Recommendation: Increase Frequency   Status: Declined per Provider         Type 2 diabetes mellitus with hyperglycemia, with long-term current use of insulin (H)    Current Medication: insulin glargine (LANTUS PEN) 100 UNIT/ML pen   Rationale: Does not understand instructions - Adherence - Adherence   Recommendation: Discontinue Medication   Status: Accepted per CPA         Type 2 diabetes mellitus without complication (H)    Current Medication: metFORMIN (GLUCOPHAGE XR) 500 MG 24 hr tablet   Rationale: Dose too low - Dosage too low - Effectiveness   Recommendation: Increase Dose   Status: Accepted per CPA

## 2022-11-22 ENCOUNTER — OFFICE VISIT (OUTPATIENT)
Dept: PHARMACY | Facility: CLINIC | Age: 42
End: 2022-11-22
Payer: COMMERCIAL

## 2022-11-22 VITALS
OXYGEN SATURATION: 97 % | WEIGHT: 219.8 LBS | DIASTOLIC BLOOD PRESSURE: 70 MMHG | SYSTOLIC BLOOD PRESSURE: 100 MMHG | HEART RATE: 62 BPM | BODY MASS INDEX: 32.46 KG/M2

## 2022-11-22 DIAGNOSIS — C34.31 MALIGNANT NEOPLASM OF LOWER LOBE OF RIGHT LUNG (H): Primary | ICD-10-CM

## 2022-11-22 DIAGNOSIS — I10 HTN, GOAL BELOW 140/90: ICD-10-CM

## 2022-11-22 DIAGNOSIS — C79.31 BRAIN METASTASIS: ICD-10-CM

## 2022-11-22 DIAGNOSIS — R51.9 NONINTRACTABLE EPISODIC HEADACHE, UNSPECIFIED HEADACHE TYPE: ICD-10-CM

## 2022-11-22 DIAGNOSIS — F43.23 ADJUSTMENT DISORDER WITH MIXED ANXIETY AND DEPRESSED MOOD: ICD-10-CM

## 2022-11-22 DIAGNOSIS — E11.65 TYPE 2 DIABETES MELLITUS WITH HYPERGLYCEMIA, WITH LONG-TERM CURRENT USE OF INSULIN (H): ICD-10-CM

## 2022-11-22 DIAGNOSIS — I26.99 PULMONARY EMBOLISM, UNSPECIFIED CHRONICITY, UNSPECIFIED PULMONARY EMBOLISM TYPE, UNSPECIFIED WHETHER ACUTE COR PULMONALE PRESENT (H): ICD-10-CM

## 2022-11-22 DIAGNOSIS — E78.5 HYPERLIPIDEMIA LDL GOAL <100: ICD-10-CM

## 2022-11-22 DIAGNOSIS — Z79.4 TYPE 2 DIABETES MELLITUS WITH HYPERGLYCEMIA, WITH LONG-TERM CURRENT USE OF INSULIN (H): ICD-10-CM

## 2022-11-22 DIAGNOSIS — M79.10 MYALGIA: ICD-10-CM

## 2022-11-22 DIAGNOSIS — Z71.85 VACCINE COUNSELING: ICD-10-CM

## 2022-11-22 PROCEDURE — 99607 MTMS BY PHARM ADDL 15 MIN: CPT | Performed by: PHARMACIST

## 2022-11-22 PROCEDURE — 99606 MTMS BY PHARM EST 15 MIN: CPT | Performed by: PHARMACIST

## 2022-11-22 RX ORDER — CITALOPRAM HYDROBROMIDE 20 MG/1
20 TABLET ORAL EVERY EVENING
Qty: 90 TABLET | Refills: 1 | Status: SHIPPED | OUTPATIENT
Start: 2022-11-22 | End: 2023-04-06

## 2022-11-22 RX ORDER — PROPRANOLOL HYDROCHLORIDE 20 MG/1
20 TABLET ORAL 2 TIMES DAILY
Qty: 60 TABLET | Refills: 5 | Status: SHIPPED | OUTPATIENT
Start: 2022-11-22 | End: 2023-04-06

## 2022-11-22 NOTE — PATIENT INSTRUCTIONS
Recommendations from today's MTM visit:                                                      Diabetes:  1.  Hold Lantus for now  2.  Can continue to take Novolog before meals the corrective dose  3.  On Sunday increase metformin to 1500mg (3 tablets) per day.  4.  Start using Mohan 2 continous glucose monitor    High Blood pressure: great job  BP Readings from Last 3 Encounters:   11/22/22 100/70   11/09/22 (!) 135/92   11/07/22 (!) 161/116     Lung clot:  Xarelto start 20 mg once daily with food.  I will check dosing with oncology.  You would stop the Lovenox.    Future visit: we schedule with your doctor Radah Lowery for next year and ordered CBC today    Follow-up: Return in about 2 weeks (around 12/6/2022) for MTM Pharmacist Visit, phone visit.  To schedule another MTM appointment, please call the clinic directly or you may call the MTM scheduling line at [phone] or toll-free at [phone].     My Clinical Pharmacist's contact information:                                                      It was a pleasure seeing you today!  Please feel free to contact me with any questions or concerns you have.      Ester Carl, PharmD [facility]  Medication Therapy Management Practitioner   #[phone]    It was great to speak with you today.  I value your experience and would be very thankful for your time with providing feedback on our clinic survey. You may receive a survey via email or text message in the next few days.

## 2022-11-22 NOTE — Clinical Note
Dr. Persaud, can you verify the Xarelto dosing--in Anna-comp I see the Xarelto 15mg twice daily for 21 days and then change to 20mg every day, he was to stop his Lovenox and start right (he has been continuing Lovenox).  Blood pressure is much better today!  Thanks Ester Carl, PharmD [facility] Medication Therapy Management Practitioner  #[phone]

## 2022-12-18 ENCOUNTER — MYC REFILL (OUTPATIENT)
Dept: ONCOLOGY | Facility: CLINIC | Age: 42
End: 2022-12-18

## 2022-12-18 DIAGNOSIS — C34.31 MALIGNANT NEOPLASM OF LOWER LOBE OF RIGHT LUNG (H): ICD-10-CM

## 2022-12-18 DIAGNOSIS — C79.31 BRAIN METASTASIS: ICD-10-CM

## 2022-12-19 ENCOUNTER — PATIENT OUTREACH (OUTPATIENT)
Dept: ONCOLOGY | Facility: CLINIC | Age: 42
End: 2022-12-19

## 2022-12-19 RX ORDER — LEVETIRACETAM 1000 MG/1
1000 TABLET ORAL 2 TIMES DAILY
Qty: 60 TABLET | Refills: 3 | Status: SHIPPED | OUTPATIENT
Start: 2022-12-19 | End: 2023-05-17

## 2022-12-19 NOTE — PROGRESS NOTES
Called Pt to ask about missed visit, refills, and how he is doing. No answer Left message on machine to call back.

## 2022-12-23 ENCOUNTER — TELEPHONE (OUTPATIENT)
Dept: ONCOLOGY | Facility: CLINIC | Age: 42
End: 2022-12-23

## 2022-12-23 NOTE — TELEPHONE ENCOUNTER
Oral Chemotherapy Monitoring Program    Placed call to patient in follow up of Alecensa (alectinib) therapy.    Left message to please call back in follow up of therapy. No patient or drug names were mentioned.    Hardy Faith  Pharmacy Intern  Oral Chemotherapy Monitoring Program  [facility]  [phone]

## 2022-12-26 ENCOUNTER — PATIENT OUTREACH (OUTPATIENT)
Dept: ONCOLOGY | Facility: CLINIC | Age: 42
End: 2022-12-26

## 2022-12-26 DIAGNOSIS — C79.31 BRAIN METASTASIS: ICD-10-CM

## 2022-12-26 RX ORDER — OXYCODONE HYDROCHLORIDE 5 MG/1
5 TABLET ORAL EVERY 4 HOURS PRN
Qty: 30 TABLET | Refills: 0 | Status: SHIPPED | OUTPATIENT
Start: 2022-12-26 | End: 2023-01-09

## 2022-12-26 NOTE — PROGRESS NOTES
Connor is requesting a refill on Oxycodone.  Last received when discharged from the hospital on 11/9.     Confirmed upcoming imaging and in person appt.  Discussed the importance of getting scheduled imaging done and also clinic follow up.     Oxycodone Rx pended and routed to Dr. Persaud.     Melani Salcido RNCC

## 2022-12-29 ENCOUNTER — PATIENT OUTREACH (OUTPATIENT)
Dept: ONCOLOGY | Facility: CLINIC | Age: 42
End: 2022-12-29

## 2022-12-29 ENCOUNTER — TELEPHONE (OUTPATIENT)
Dept: ONCOLOGY | Facility: CLINIC | Age: 42
End: 2022-12-29

## 2022-12-29 ENCOUNTER — APPOINTMENT (OUTPATIENT)
Dept: GENERAL RADIOLOGY | Facility: CLINIC | Age: 42
End: 2022-12-29
Attending: EMERGENCY MEDICINE
Payer: COMMERCIAL

## 2022-12-29 ENCOUNTER — HOSPITAL ENCOUNTER (EMERGENCY)
Facility: CLINIC | Age: 42
Discharge: HOME | End: 2022-12-29
Attending: EMERGENCY MEDICINE | Admitting: EMERGENCY MEDICINE
Payer: COMMERCIAL

## 2022-12-29 VITALS
OXYGEN SATURATION: 97 % | SYSTOLIC BLOOD PRESSURE: 161 MMHG | RESPIRATION RATE: 18 BRPM | TEMPERATURE: 98.6 F | DIASTOLIC BLOOD PRESSURE: 106 MMHG | HEART RATE: 75 BPM | WEIGHT: 215 LBS | BODY MASS INDEX: 31.75 KG/M2

## 2022-12-29 DIAGNOSIS — M25.851 HIP IMPINGEMENT SYNDROME, RIGHT: ICD-10-CM

## 2022-12-29 DIAGNOSIS — M25.552 BILATERAL HIP PAIN: ICD-10-CM

## 2022-12-29 DIAGNOSIS — C34.31 MALIGNANT NEOPLASM OF LOWER LOBE OF RIGHT LUNG (H): ICD-10-CM

## 2022-12-29 DIAGNOSIS — M25.551 BILATERAL HIP PAIN: ICD-10-CM

## 2022-12-29 DIAGNOSIS — M25.852 HIP IMPINGEMENT SYNDROME, LEFT: ICD-10-CM

## 2022-12-29 LAB
ALBUMIN SERPL BCG-MCNC: 4.5 G/DL (ref 3.5–5.2)
ALP SERPL-CCNC: 83 U/L (ref 40–129)
ALT SERPL W P-5'-P-CCNC: 17 U/L (ref 10–50)
ANION GAP SERPL CALCULATED.3IONS-SCNC: 13 MMOL/L (ref 7–15)
AST SERPL W P-5'-P-CCNC: 20 U/L (ref 10–50)
BASOPHILS # BLD AUTO: 0 10E3/UL (ref 0–0.2)
BASOPHILS NFR BLD AUTO: 1 %
BILIRUB SERPL-MCNC: 0.6 MG/DL
BUN SERPL-MCNC: 20.4 MG/DL (ref 6–20)
CALCIUM SERPL-MCNC: 9.5 MG/DL (ref 8.6–10)
CHLORIDE SERPL-SCNC: 104 MMOL/L (ref 98–107)
CREAT SERPL-MCNC: 0.7 MG/DL (ref 0.67–1.17)
DEPRECATED HCO3 PLAS-SCNC: 27 MMOL/L (ref 22–29)
EOSINOPHIL # BLD AUTO: 0.3 10E3/UL (ref 0–0.7)
EOSINOPHIL NFR BLD AUTO: 4 %
ERYTHROCYTE [DISTWIDTH] IN BLOOD BY AUTOMATED COUNT: 12.4 % (ref 10–15)
GFR SERPL CREATININE-BSD FRML MDRD: >90 ML/MIN/1.73M2
GLUCOSE SERPL-MCNC: 140 MG/DL (ref 70–99)
HCT VFR BLD AUTO: 41 % (ref 40–53)
HGB BLD-MCNC: 13.4 G/DL (ref 13.3–17.7)
HOLD SPECIMEN: NORMAL
HOLD SPECIMEN: NORMAL
IMM GRANULOCYTES # BLD: 0 10E3/UL
IMM GRANULOCYTES NFR BLD: 0 %
LYMPHOCYTES # BLD AUTO: 2.5 10E3/UL (ref 0.8–5.3)
LYMPHOCYTES NFR BLD AUTO: 32 %
MCH RBC QN AUTO: 31.3 PG (ref 26.5–33)
MCHC RBC AUTO-ENTMCNC: 32.7 G/DL (ref 31.5–36.5)
MCV RBC AUTO: 96 FL (ref 78–100)
MONOCYTES # BLD AUTO: 0.6 10E3/UL (ref 0–1.3)
MONOCYTES NFR BLD AUTO: 7 %
NEUTROPHILS # BLD AUTO: 4.3 10E3/UL (ref 1.6–8.3)
NEUTROPHILS NFR BLD AUTO: 56 %
NRBC # BLD AUTO: 0 10E3/UL
NRBC BLD AUTO-RTO: 0 /100
PLATELET # BLD AUTO: 249 10E3/UL (ref 150–450)
POTASSIUM SERPL-SCNC: 3.4 MMOL/L (ref 3.4–5.3)
PROT SERPL-MCNC: 6.8 G/DL (ref 6.4–8.3)
RBC # BLD AUTO: 4.28 10E6/UL (ref 4.4–5.9)
SODIUM SERPL-SCNC: 144 MMOL/L (ref 136–145)
WBC # BLD AUTO: 7.8 10E3/UL (ref 4–11)

## 2022-12-29 PROCEDURE — 73522 X-RAY EXAM HIPS BI 3-4 VIEWS: CPT

## 2022-12-29 PROCEDURE — 85025 COMPLETE CBC W/AUTO DIFF WBC: CPT | Performed by: EMERGENCY MEDICINE

## 2022-12-29 PROCEDURE — 80053 COMPREHEN METABOLIC PANEL: CPT | Performed by: EMERGENCY MEDICINE

## 2022-12-29 PROCEDURE — 82550 ASSAY OF CK (CPK): CPT

## 2022-12-29 PROCEDURE — 36415 COLL VENOUS BLD VENIPUNCTURE: CPT | Performed by: EMERGENCY MEDICINE

## 2022-12-29 PROCEDURE — 99284 EMERGENCY DEPT VISIT MOD MDM: CPT

## 2022-12-29 ASSESSMENT — ACTIVITIES OF DAILY LIVING (ADL): ADLS_ACUITY_SCORE: 33

## 2022-12-29 ASSESSMENT — ENCOUNTER SYMPTOMS: ARTHRALGIAS: 1

## 2022-12-29 NOTE — TELEPHONE ENCOUNTER
Oral Chemotherapy Monitoring Program    Subjective/Objective:  Connor Emerson is a 42 year old male contacted by phone for a follow-up visit for oral chemotherapy. Connor was not fairing well during our conversation, and reports having grade 3 myalgia/arthralgias around his hips not being resolved using both non-pharmacological interventions such as heat and pharmacological interventions with oxycodone 5 mg tablet. He historically has had pain and used ibuprofen and acetaminophen, but these did not resolve the pain. He reports the pain today being 10/10 when at rest, localized and non-radiating, with it only diminishing when he moves around. It has recently affected his work-life/personal life, and he does not feel as if this is currently being managed. He currently is taking Alecensa (alectinib) and has been adherent with the dose and regimen, and believes his pain may be related to this medication, as it has increased despite dose reduction during the past months. He does not report taking any new medications as of recently, and has been able to get adequate nutrition and water intake. No other side effects reported including any SOB/loss of consciousness.    ORAL CHEMOTHERAPY 9/1/2022 9/28/2022 10/5/2022 10/13/2022 11/21/2022 12/23/2022 12/29/2022   Assessment Type Lab Monitoring;Other Refill Other Left Voicemail Refill Left Voicemail;Monthly Follow up Monthly Follow up   Diagnosis Code Non-Small Cell Lung Cancer Non-Small Cell Lung Cancer Non-Small Cell Lung Cancer Non-Small Cell Lung Cancer Non-Small Cell Lung Cancer Non-Small Cell Lung Cancer Non-Small Cell Lung Cancer   Providers Dr. Cori Persaud   [facility]   Drug Name Alecensa (alectinib) Alecensa (alectinib) Alecensa (alectinib) Alecensa (alectinib) Alecensa (alectinib) Alecensa (alectinib) Alecensa (alectinib)   Dose 450  mg 300 mg 300 mg 300 mg 300 mg 300 mg 300 mg   Current Schedule BID BID BID BID BID BID BID   Cycle Details Drug on Hold Continuous Continuous Continuous Continuous Continuous Continuous   Start Date of Last Cycle - - - - - - -   Doses missed in last 2 weeks - - - - - - 0   Adherence Assessment - - - - - - Adherent   Adverse Effects Anemia - - - - - Myalgias/Arthralgias   Myalgias/Arthralgias - - - - - - Grade 3   Pharmacist Intervention(myalgias/arthralgias) - - - - - - Yes   Intervention(s) - - - - - - Referral to emergency/urgent care;Patient education   Pharmacist Intervention(anemia) Yes - - - - - -   Intervention(s) Recommend drug hold;Referral to oncology provider - - - - - -   Other (See Note for Details) - - - - - - -   Pharmacist intervention(other) - - - - - - -   Intervention(s) - - - - - - -   Any new drug interactions? - - - - - - No   Is the dose as ordered appropriate for the patient? - - - - - - Yes   Is the patient currently in pain? - - - - - - Yes   Does the patient feel the pain is currently being managed by a provider? - - - - - - No   Has the patient been assessed within the past 6 months for depression? - - - - - - No   Has the patient missed any days of school, work, or other routine activity? - - - - - - Yes   Days missed in the past month: - - - - - - More than 5 days   Since the last time we talked, have you been hospitalized or used the emergency room? - - - - - - Yes   What medical service did you use? - - - - - - Emergency Room       Last PHQ-2 Score on record:   PHQ-2 ( 1999 [employer]) 6/27/2022 2/24/2022   Q1: Little interest or pleasure in doing things 0 0   Q2: Feeling down, depressed or hopeless 0 0   PHQ-2 Score 0 0   PHQ-2 Total Score (12-17 Years)- Positive if 3 or more points; Administer PHQ-A if positive - -   Q1: Little interest or pleasure in doing things Not at all Not at all   Q2: Feeling down, depressed or hopeless Not at all Not at all   PHQ-2 Score 0 0       Vitals:  BP:  "  BP Readings from Last 1 Encounters:   11/22/22 100/70     Wt Readings from Last 1 Encounters:   11/22/22 99.7 kg (219 lb 12.8 oz)     Estimated body surface area is 2.2 meters squared as calculated from the following:    Height as of 11/16/22: 1.753 m (5' 9\").    Weight as of 11/22/22: 99.7 kg (219 lb 12.8 oz).    Assessment/Plan:  Because the patient is currently in pain that is severe (10/10) that is not being controlled using both pharmacological/non-pharmacological intervention, I recommended he go to the ED to get this assessed, as he reported that he lives nearby [facility] in [place], [place]. He will not be meeting with Dr. Persaud until 1/9, so it is ideal that he go receive care before pain/condition gets worse, and direction on whether to hold his Alecensa (alectinib). The patient agrees with this and said he will go get assessed. Will monitor closely for any changes in therapy on our end, and additionally, I sent Dr. Persaud an in-basket message to address this situation.    Follow-Up:  Provider appt w/Dr. Persaud on 1/9/23 @2:15PM; will look for changes based on ED visit.    Refill Due:  1/17/23    Hardy Faith  Pharmacy Intern  Oral Chemotherapy Monitoring Program  [facility]  [phone]    "

## 2022-12-30 ENCOUNTER — TELEPHONE (OUTPATIENT)
Dept: ONCOLOGY | Facility: CLINIC | Age: 42
End: 2022-12-30

## 2022-12-30 DIAGNOSIS — C34.31 MALIGNANT NEOPLASM OF LOWER LOBE OF RIGHT LUNG (H): Primary | ICD-10-CM

## 2022-12-30 LAB — CK SERPL-CCNC: 74 U/L (ref 39–308)

## 2022-12-30 NOTE — ED TRIAGE NOTES
Presents to ED with c/o increased bilateral hip pain over the past month. No known injury. Patient has stage 4 cancer and has chronic joint pain, but this pain is worse and not managed by current pain medications. Oncologist told him to be seen in ED to be evaluated. Daily oral chemo, xarelto, has home oxycodone last used yesterday but uneffective     Triage Assessment     Row Name 12/29/22 4954       Triage Assessment (Adult)    Airway WDL WDL       Respiratory WDL    Respiratory WDL WDL       Cardiac WDL    Cardiac WDL WDL

## 2022-12-30 NOTE — ORAL ONC MGMT
Oral Chemotherapy Monitoring Program     Placed call to patient in follow up of oral chemotherapy. Dr. Persaud would like him to hold the alectinib until his next appointment with her on 1/9/23. Patient expressed understanding.    Zakiya Sheridan, PharmD, BCPS  Hematology/Oncology Clinical Pharmacist  Oral Chemotherapy Monitoring Program  [facility]  [phone]

## 2022-12-30 NOTE — ED PROVIDER NOTES
"  History   Chief Complaint:  Hip Pain       The history is provided by the patient.      Connor Emerson is a 42 year old male, currently undergoing daily Alecensa oral chemotherapy for stage 4 cancer, anticoagulated on Xarelto, who presents with joint pain, with the worst pain in his hips bilateral. He also reports pain in the shoulders, knuckles, knees, and ankles. He reports the pain makes it difficult to move. He plans to see his oncologist next week for a 3 month check-up on his cancer. Denies fever. Reports his urine is \"almost clear.\" He sees an oncologist at the [facility].    Review of Systems   Musculoskeletal: Positive for arthralgias (shoulders, knuckles, knees, ankles, and hips).   All other systems reviewed and are negative.    Allergies:  Vicodin [Hydrocodone-Acetaminophen]    Medications:  Imitrex  Atenolol  Omeprazole  Alectinib  Insulin aspart  Insulin glargine  Hydrodiuril  Keppra  Lisinopril   Robaxin  Oxycodone  Compazine  Inderal  Xarelto    Past Medical History:     Hyperlipidemia   Insomnia  Hypertension   GERD  Chronic migraine  Diabetes mellitus, type 2  Lateral epicondylitis of right elbow  Malignant neoplasm of lower lobe of right lung   Brain metastasis  Sepsis  Seizure  PE  Anemia  Radiation therapy induced brain necrosis  Pleural effusion on right     Past Surgical History:    Bronchoscopy bilateral   Inject block medial branch cervical/thoracic/lumbar  Insert chest tube  Optical tracking system craniotomy, excise tumor, combined  Rotator cuff repair, bilateral   Pleuroscopy     Family History:    Mother- uterine cancer    Social History:  The patient presents to the ED with a .  PCP: Radha Shetty Ra     Physical Exam     Patient Vitals for the past 24 hrs:   BP Temp Temp src Pulse Resp SpO2 Weight   12/29/22 1832 (!) 161/106 98.6  F (37  C) Temporal 75 18 97 % 97.5 kg (215 lb)       Physical Exam  General: Patient is well appearing. No distress.  Head: Atraumatic.  Eyes: " Conjunctivae are normal. No scleral icterus.  Neck: Normal range of motion. Neck supple.   Cardiovascular: Normal rate, regular rhythm, normal heart sounds and intact distal pulses.   Pulmonary/Chest: Breath sounds normal. No respiratory distress.  Abdominal: Soft. Bowel sounds are normal. No distension. No tenderness. No rebound or guarding.   Musculoskeletal: No swelling, redness, warmth, or tenderness to any of the joints.  Ambulatory to the room.  Skin: Warm and dry. No rash noted. Not diaphoretic.     Emergency Department Course   Imaging:  XR Pelvis w 1 View Each Hip   Final Result   IMPRESSION: No osseous lesion. Broadening and hypertrophic change in the femoral head neck junctions bilaterally can be associated with cam-type impingement.        Report per radiology    Laboratory:  Labs Ordered and Resulted from Time of ED Arrival to Time of ED Departure   COMPREHENSIVE METABOLIC PANEL - Abnormal       Result Value    Sodium 144      Potassium 3.4      Chloride 104      Carbon Dioxide (CO2) 27      Anion Gap 13      Urea Nitrogen 20.4 (*)     Creatinine 0.70      Calcium 9.5      Glucose 140 (*)     Alkaline Phosphatase 83      AST 20      ALT 17      Protein Total 6.8      Albumin 4.5      Bilirubin Total 0.6      GFR Estimate >90     CBC WITH PLATELETS AND DIFFERENTIAL - Abnormal    WBC Count 7.8      RBC Count 4.28 (*)     Hemoglobin 13.4      Hematocrit 41.0      MCV 96      MCH 31.3      MCHC 32.7      RDW 12.4      Platelet Count 249      % Neutrophils 56      % Lymphocytes 32      % Monocytes 7      % Eosinophils 4      % Basophils 1      % Immature Granulocytes 0      NRBCs per 100 WBC 0      Absolute Neutrophils 4.3      Absolute Lymphocytes 2.5      Absolute Monocytes 0.6      Absolute Eosinophils 0.3      Absolute Basophils 0.0      Absolute Immature Granulocytes 0.0      Absolute NRBCs 0.0        Emergency Department Course:       Reviewed:  I reviewed nursing notes, vitals and past medical  history    Assessments:  2036 I obtained history and examined the patient as noted above. They were amenable to plan for discharge.     Disposition:  The patient was discharged to home.     Impression & Plan   Medical Decision Making:  Pt has active cancer treatment that may account for polyarthralgia and no signs of infection inflammation outwardly.  Already on multidose regimen.  No new lesion in hips which is what is bothering him the worse at this point.   No red flags for lumbar spine nor vascular.  Has impingement type findings on XR of the joints.  Has plan for full workup with oncologist this coming week and understands expectant instructions as well as return.    Critical Care Time: was 0 minutes for this patient excluding procedures    Diagnosis:    ICD-10-CM    1. Bilateral hip pain  M25.551     M25.552       2. Hip impingement syndrome, left  M25.852       3. Hip impingement syndrome, right  M25.851           Discharge Medications:  Discharge Medication List as of 12/29/2022  8:53 PM          Scribe Disclosure:  I, Paula Zamudio, am serving as a scribe at 8:35 PM on 12/29/2022 to document services personally performed by Mahin Almanza MD based on my observations and the provider's statements to me.          Mahin Almanza MD  12/30/22 0046

## 2023-01-06 ENCOUNTER — HOSPITAL ENCOUNTER (OUTPATIENT)
Dept: MRI IMAGING | Facility: CLINIC | Age: 43
Discharge: HOME | End: 2023-01-06
Attending: STUDENT IN AN ORGANIZED HEALTH CARE EDUCATION/TRAINING PROGRAM
Payer: COMMERCIAL

## 2023-01-06 ENCOUNTER — HOSPITAL ENCOUNTER (OUTPATIENT)
Dept: CT IMAGING | Facility: CLINIC | Age: 43
Discharge: HOME | End: 2023-01-06
Attending: STUDENT IN AN ORGANIZED HEALTH CARE EDUCATION/TRAINING PROGRAM
Payer: COMMERCIAL

## 2023-01-06 DIAGNOSIS — C34.31 PRIMARY MALIGNANT NEOPLASM OF RIGHT LOWER LOBE OF LUNG (H): ICD-10-CM

## 2023-01-06 DIAGNOSIS — C79.31 BRAIN METASTASIS: ICD-10-CM

## 2023-01-06 PROCEDURE — 250N000011 HC RX IP 250 OP 636: Performed by: STUDENT IN AN ORGANIZED HEALTH CARE EDUCATION/TRAINING PROGRAM

## 2023-01-06 PROCEDURE — 258N000003 HC RX IP 258 OP 636: Performed by: STUDENT IN AN ORGANIZED HEALTH CARE EDUCATION/TRAINING PROGRAM

## 2023-01-06 PROCEDURE — 255N000002 HC RX 255 OP 636: Performed by: STUDENT IN AN ORGANIZED HEALTH CARE EDUCATION/TRAINING PROGRAM

## 2023-01-06 PROCEDURE — A9585 GADOBUTROL INJECTION: HCPCS | Performed by: STUDENT IN AN ORGANIZED HEALTH CARE EDUCATION/TRAINING PROGRAM

## 2023-01-06 PROCEDURE — 71260 CT THORAX DX C+: CPT

## 2023-01-06 PROCEDURE — 70553 MRI BRAIN STEM W/O & W/DYE: CPT

## 2023-01-06 RX ORDER — IOPAMIDOL 755 MG/ML
500 INJECTION, SOLUTION INTRAVASCULAR ONCE
Status: COMPLETED | OUTPATIENT
Start: 2023-01-06 | End: 2023-01-06

## 2023-01-06 RX ORDER — GADOBUTROL 604.72 MG/ML
10 INJECTION INTRAVENOUS ONCE
Status: COMPLETED | OUTPATIENT
Start: 2023-01-06 | End: 2023-01-06

## 2023-01-06 RX ADMIN — SODIUM CHLORIDE 65 ML: 9 INJECTION, SOLUTION INTRAVENOUS at 09:32

## 2023-01-06 RX ADMIN — GADOBUTROL 10 ML: 604.72 INJECTION INTRAVENOUS at 09:48

## 2023-01-06 RX ADMIN — IOPAMIDOL 100 ML: 755 INJECTION, SOLUTION INTRAVENOUS at 09:32

## 2023-01-09 ENCOUNTER — ONCOLOGY VISIT (OUTPATIENT)
Dept: ONCOLOGY | Facility: CLINIC | Age: 43
End: 2023-01-09
Attending: STUDENT IN AN ORGANIZED HEALTH CARE EDUCATION/TRAINING PROGRAM
Payer: COMMERCIAL

## 2023-01-09 VITALS
SYSTOLIC BLOOD PRESSURE: 153 MMHG | BODY MASS INDEX: 31.85 KG/M2 | TEMPERATURE: 98.7 F | DIASTOLIC BLOOD PRESSURE: 97 MMHG | WEIGHT: 215.7 LBS | HEART RATE: 68 BPM | OXYGEN SATURATION: 97 % | RESPIRATION RATE: 18 BRPM

## 2023-01-09 DIAGNOSIS — C34.31 PRIMARY MALIGNANT NEOPLASM OF RIGHT LOWER LOBE OF LUNG (H): ICD-10-CM

## 2023-01-09 DIAGNOSIS — J18.9 PNEUMONIA OF LEFT UPPER LOBE DUE TO INFECTIOUS ORGANISM: ICD-10-CM

## 2023-01-09 DIAGNOSIS — C79.31 BRAIN METASTASIS: ICD-10-CM

## 2023-01-09 DIAGNOSIS — M19.90 ARTHRITIS: ICD-10-CM

## 2023-01-09 DIAGNOSIS — I26.99 PULMONARY EMBOLISM, UNSPECIFIED CHRONICITY, UNSPECIFIED PULMONARY EMBOLISM TYPE, UNSPECIFIED WHETHER ACUTE COR PULMONALE PRESENT (H): ICD-10-CM

## 2023-01-09 DIAGNOSIS — G89.3 CANCER ASSOCIATED PAIN: Primary | ICD-10-CM

## 2023-01-09 LAB
BASOPHILS # BLD AUTO: 0.1 10E3/UL (ref 0–0.2)
BASOPHILS NFR BLD AUTO: 1 %
CRP SERPL-MCNC: <3 MG/L
EOSINOPHIL # BLD AUTO: 0.4 10E3/UL (ref 0–0.7)
EOSINOPHIL NFR BLD AUTO: 4 %
ERYTHROCYTE [DISTWIDTH] IN BLOOD BY AUTOMATED COUNT: 12 % (ref 10–15)
ERYTHROCYTE [SEDIMENTATION RATE] IN BLOOD BY WESTERGREN METHOD: 6 MM/HR (ref 0–15)
HCT VFR BLD AUTO: 42 % (ref 40–53)
HGB BLD-MCNC: 14.1 G/DL (ref 13.3–17.7)
IMM GRANULOCYTES # BLD: 0 10E3/UL
IMM GRANULOCYTES NFR BLD: 0 %
LYMPHOCYTES # BLD AUTO: 2.5 10E3/UL (ref 0.8–5.3)
LYMPHOCYTES NFR BLD AUTO: 26 %
MCH RBC QN AUTO: 30.9 PG (ref 26.5–33)
MCHC RBC AUTO-ENTMCNC: 33.6 G/DL (ref 31.5–36.5)
MCV RBC AUTO: 92 FL (ref 78–100)
MONOCYTES # BLD AUTO: 0.6 10E3/UL (ref 0–1.3)
MONOCYTES NFR BLD AUTO: 6 %
NEUTROPHILS # BLD AUTO: 6 10E3/UL (ref 1.6–8.3)
NEUTROPHILS NFR BLD AUTO: 63 %
NRBC # BLD AUTO: 0 10E3/UL
NRBC BLD AUTO-RTO: 0 /100
PLATELET # BLD AUTO: 238 10E3/UL (ref 150–450)
RBC # BLD AUTO: 4.56 10E6/UL (ref 4.4–5.9)
WBC # BLD AUTO: 9.5 10E3/UL (ref 4–11)

## 2023-01-09 PROCEDURE — 36415 COLL VENOUS BLD VENIPUNCTURE: CPT | Performed by: STUDENT IN AN ORGANIZED HEALTH CARE EDUCATION/TRAINING PROGRAM

## 2023-01-09 PROCEDURE — 99215 OFFICE O/P EST HI 40 MIN: CPT | Performed by: STUDENT IN AN ORGANIZED HEALTH CARE EDUCATION/TRAINING PROGRAM

## 2023-01-09 PROCEDURE — G0463 HOSPITAL OUTPT CLINIC VISIT: HCPCS

## 2023-01-09 PROCEDURE — 85652 RBC SED RATE AUTOMATED: CPT | Performed by: STUDENT IN AN ORGANIZED HEALTH CARE EDUCATION/TRAINING PROGRAM

## 2023-01-09 PROCEDURE — 86140 C-REACTIVE PROTEIN: CPT | Performed by: STUDENT IN AN ORGANIZED HEALTH CARE EDUCATION/TRAINING PROGRAM

## 2023-01-09 PROCEDURE — 85004 AUTOMATED DIFF WBC COUNT: CPT | Performed by: STUDENT IN AN ORGANIZED HEALTH CARE EDUCATION/TRAINING PROGRAM

## 2023-01-09 RX ORDER — AZITHROMYCIN 250 MG/1
TABLET, FILM COATED ORAL
Qty: 6 TABLET | Refills: 0 | Status: SHIPPED | OUTPATIENT
Start: 2023-01-09 | End: 2023-01-14

## 2023-01-09 RX ORDER — OXYCODONE HYDROCHLORIDE 5 MG/1
5 TABLET ORAL EVERY 4 HOURS PRN
Qty: 60 TABLET | Refills: 0 | Status: SHIPPED | OUTPATIENT
Start: 2023-01-09 | End: 2023-01-28

## 2023-01-09 ASSESSMENT — PAIN SCALES - GENERAL: PAINLEVEL: MODERATE PAIN (5)

## 2023-01-09 NOTE — LETTER
1/9/2023         RE: Connor Emerson  [address]  [facility] [address]        Dear Colleague,    Thank you for referring your patient, Connor Emerson, to the [facility]. Please see a copy of my visit note below.    MEDICAL ONCOLOGY FOLLOW UP NOTE    PATIENT NAME: Connor Emerson  ENCOUNTER DATE: 1/9/2023    Care Team  Primary Oncologist: Bassam Persaud MD    REASON FOR CURRENT VISIT: F/u of lung cancer    HISTORY OF PRESENT ILLNESS:  Mr. Connor Emerson is a 42 year old  male who is a non-smoker with PMHx of T2DM, HTN with metastatic NSCLC comes for follow up     Oncologic Hx:    Diagnosis:   Stage IV NSCLC, Rt lung adenocarcinoma with metastasis to pleura, mediastinum , rt pleural effusion and brain diagnosed 1/2022 (AJCC 8th edition)  PD-L1 TPS 2-3% by Rosa Sanchez   NGS [facility] panel-EML4:ALK rearragement  NGS Guardant- GNAS R201H, KRAS K5E- No ALK    Treatment:   Current:  3/2/22- now- Alectinib 300 mg BID (Dose reduced to 450 mg BID from 600 mg BID due to grade 3 myalgias 3/21/22, again reduced to 300 mg BID 9/28/22)    )  Past:  2/15/22- GK to 11 briain lesions  6/28/22- Craniotomy, resection  9/28/22-10/26- Bevacizumab for radiation necrosis (stopped due to PE)    Intent of treatment: Palliative    Oncologic course:  1/19/22 to 1/22/22-Admitted to [facility] for 2 week progressive SOB secondary to have large rt sided pleural effusion, needing thoracentesis x2 (1.7L and 2.0 L removed), cytology positive for malignancy, adenocarcinoma.   1/26/22- Rt pleural mass biopsy-Dr. Agrawal--POSITIVE FOR ADENOCARCINOMA CONSISTENT WITH LUNG PRIMARY, admixed with mesothelial hyperplasia and inflammatory infiltrate (+ TTF-1 and CK 7;  negative  p40, calretinin and WT-1. PAX8 immunostain focal +). 4th thoracentesis done simultaneously - 3L approx removed.   2/1/22- PET/CT-Right lower lobe central infiltrative FDG avid 8.2 x 9.6 cm mass representing a primary lung adenocarcinoma. Ipsilateral right  perihilar, bilateral pretracheal, subcarinal and superior mediastinal michele metastases. Contralateral mildly FDG avid few lung nodules are suspicious for contralateral metastasis. At least 3 intracranial metastases in the right frontal lobe, left frontal lobe and left cerebellar hemisphere. Nonspecific mild diffuse bone marrow uptake. Further evaluation with a spine MRI could be considered to rule out early marrow infiltration. This could also be seen with red marrow conversion.  2/5/22-  Brain MRI- At least 9 intracranial metastases as detailed above. The dominant lesions involving the orbital right frontal lobe, the posterior left middle frontal gyrus, anterior right temporal lobe and in the left cerebellar hemisphere have surrounding moderate vasogenic type edema.  2/15/22- Saw Dr. Arango from [facility]- Rcd GK to 12 lesion in bran  2/16/22- Pleurex placement   3/2/22- Started Alectinib 600 mg BID  3/21/22- Dose reduced to 450 mg BID due to grade 3 myalgias and fatigue  4/2/22 to 4/5/22- Admitted at [facility] for- Severe sepsis due to MSSA infection of right PleurX catheter s/p removal- He presented with onset of pain at tube site starting 4/1; at arrival was tachycardic with leukocytosis (22.7) and elevated lactic acid (2.9).  CT chest showed fluid and stranding tracking outside the pleural space into chest wall along pleural catheter.  IR was consulted and removed catheter 4/2 with report of pustular drainage and tip culture growing MSSA.  Thoracic Surg was consulted who felt no surgical indication necessary given minimal pleural fluid and lack of any signs of abscess.  Initially treated with broad spectrum coverage for sepsis, narrowed to Ancef once sensitivities returned with plan to transition to cefadroxil for an additional 10 days at discharge per ID. Held drug 4/2 to 4/11 5/2/22- CT CAP- Overall, positive response to therapy with decreased size of right lower lobe and right pleural-based masses,  pulmonary metastases, hilar and mediastinal lymphadenopathy. However, a single right posterior pleural-based mass has slightly increased in size since 2/24/2022. No metastatic disease in the abdomen and pelvis. Right Pleurx catheter has been removed. Trace right pleural effusion and right basilar atelectasis.  5/2/22- Brain MRI- The previously demonstrated brain metastases are mildly diminished in size versus to 2/5/2022. The degree of edema is also diminished but not completely resolved. Probable trace amounts of intralesional bleeding demonstrated on the gradient sequence within the metastases. No definite new metastasis or progressive mass effect. No hydrocephalus or infarct.    6/15/22 to 6/17/22- Admitted at [facility]-with aphasia and word finding difficulty over last few weeks.  He presented to [facility] ED on 6/10 for evaluation of his symptoms. MRI brain showed multiple intracranial metastases, with interval enlargement of the dominant lesion within the left frontal lobe and increased surrounding vasogenic edema with 2 mm rightward shift of the septum pellucidum. Due to his worsening anxiety, he left AMA. His symptoms continued to progress to where he could not write at work so he decided to go to the ED for re-evaluation and treatment. Evaluated by NSGY, Rad Onc (radiaiton necrosis vs tumor progression).  6/16/22- MR Brain (6/16) shows multiple intracranial metastases, with interval enlargement  of the dominant lesion within the left frontal lobe and increased surrounding vasogenic edema with 2 mm rightward shift of the septum pellucidum.  6/16/22- - CT CAP shows slightly decreased size of right lower lobe and right pleural-based masses. No new pulmonary nodules or lymphadenopathy; No evidence of metastatic disease in the abdomen or pelvis.   6/28/22 to 6/30/22- Admitted at [facility]- Elective left Stealth craniotomy with resection of brain tumor due to ongoing symptoms. No intraoperative complications. EBL 50 ml.  Path  showing radiation necrosis- no evidence of tumor.  7/5/22- Ct CAP- Right lower lobe low-density nodules are not significantly changed. A small left upper lobe pulmonary nodule is also unchanged. Trace pleural fluid on the right has increased slightly. No convincing evidence for metastatic disease in the abdomen or pelvis.  7/18/22 to 7/19/22- Admitted to [facility] for seizure- Reportedly was only taking once Keppra instead of twice daily. Also resumed on dexamethasone 2 mg daily  8/1/22- Brain MRI- Redemonstrated postsurgical changes status post left frontoparietal Craniotomy. Interval increase in size of the dominant ring-enhancing lesion in the left posterior superior frontal lobe with increased moderate surrounding vasogenic edema and local mass effect resulting in narrowing of the supratentorial ventricular system. No significant midline shift/herniation at this time  8/1/22- Dex was increased to 4 mg daily by Dr. Moran  9/1/22- CT Chest- Near resolution of previously seen right pleural nodule. Stable right lower lobe pulmonary nodule  9/28/22- Bevacizumab for radiation necrosis  10/26/22- Bevacizumab   10/26/22- Ct CAP- Stable posterior medial right lower lobe 1.9 x 1.1 cm nodule series 8 image 176. Adjacent stable scarring and atelectasis. The previously noted pleural nodule posteriorly on the right is not currently clearly identified. Stable left upper lobe 0.3 cm nodule image 56  10/26/22- Brain MRI- Overall improved appearance of multiple intracranial metastases with near resolution of associated edema and diminished enhancement and size of multiple residual lesions. No definite new or progressive metastasis.  11/7/22 to 11/9/22- Admitted for PE and HTN urgency- Small pulmonary embolism in the right lower lobe pulmonary artery. started on Lovenox, Brain MRI neg for PRES.  12/29/22- ED visit- bilateral hip pain, pain in shoulders, knuckles, knees, and ankles- holding alectinib since 12/20/22 1/6/23- Ct CAP- Mild  groundglass nodularity in the left upper lobe is new since the previous exam, and may be infectious in etiology. No other significant interval change. Pulmonary nodules are not significantly changed.  1/6/23- Brain MRI- Stable to diminished sequelae of intracranial metastasis and treatment changes. No new or progressive metastasis. No superimposed acute intracranial finding.        He works as a maintenance manger for apartment complexes    Interval Hx:  Connor is here to follow up.Was in ER end of Dec for 1-2 weeks of bilateral hip pain, pain in shoulders, knuckles, knees, and ankles- holding alectinib since 12/20/22  Work up was neg  The arthralgia Started one month ago  Has moderate to severe Pain in the joints, all llarge and small jopints (nearly every single joints)  Joints in the hands, shoulders, toes, hips and knees  He has been doing heat packs and has tried asa, tylenol, ibupfren ( 6 times daily)  Takes oxycodone 4 times daily which helps with the pain, able to manage the pain  Stopped alectinib 1 weeks, notices about 25% improvement  Everything else is ok  He continues to work daily  Recently noticed cough is worse than usual, bringing up clear sputum  No fever or chills  Breathing is stable, no cough, no fever or chills.  Able to eat and drink well  Independent of ADL and IADL      REVIEW OF SYSTEMS: 14 point ROS negative other than the symptoms noted above in the HPI.    Wt Readings from Last 4 Encounters:   12/29/22 97.5 kg (215 lb)   11/22/22 99.7 kg (219 lb 12.8 oz)   11/16/22 94.8 kg (209 lb)   11/09/22 94.9 kg (209 lb 4.8 oz)      Review of Systems:  A comprehensive ROS was performed and found to be negative or non-contributory with the exception of that noted in the HPI above.    Past Medical History:  GERD  Hypertension, not on medication  Type 2 diabetes mellitus, not on medications currently, previously on Metformin    Past Surgical History:  Past Surgical History:   Procedure Laterality Date      BRONCHOSCOPY RIGID OR FLEXIBLE W/TRANSENDOSCOPIC ENDOBRONCHIAL ULTRASOUND GUIDED Bilateral 2022    Procedure: Right BRONCHOSCOPY, FIBEROPTIC, endobronchial ultrasound, pleural biopsy;  Surgeon: Dallin Agrawal MD;  Location: [facility] OR     INJECT BLOCK MEDIAL BRANCH CERVICAL/THORACIC/LUMBAR       INSERT CHEST TUBE Right 2022    Procedure: INSERTION, CATHETER, INTERCOSTAL, FOR DRAINAGE;  Surgeon: Dallin Agrawal MD;  [facility]: [facility] GI     INSERT CHEST TUBE Right 3/9/2022    Procedure: INSERTION, CATHETER, INTERCOSTAL, FOR DRAINAGE;  Surgeon: Sushila Antonio MD;  [facility]: [facility] GI     IR CHEST TUBE REMOVAL TUNNELED RIGHT  2022     OPTICAL TRACKING SYSTEM CRANIOTOMY, EXCISE TUMOR, COMBINED Left 2022    Procedure: Left stealth craniotomy for tumor resection with motor mapping;  Surgeon: Stephen Moran MD;  Location:  OR     ORTHOPEDIC SURGERY      Ganesh. Rotator cuff repair.     PLEUROSCOPY N/A 2022    Procedure: Pleuroscopy with Pleural Biopsy;  Surgeon: Dallin Agrawal MD;  Location:  OR       Social History:  Lives with wife and 4 kids in [place]. Works as a  for an apartment complex in [place]. Exposure to household chemicals and . No significant exposure to asbestos. No signal exposure to benzene or similar chemicals. No significant smoking history-states that he smoked 1 to 2 cigarettes occasionally per month for about 2 years in college, non-smoking since then. No significant alcohol use history. No other recreational substances. Good support system. Kids are 23, 19, 17 and 13.    Family History  Significant history for cancers on maternal side. Mother  of uterine cancer. 2 maternal uncles have possible metastatic melanoma.    Outpatient Medications:  Current Outpatient Medications   Medication     alcohol swab prep pads     alectinib (ALECENSA) 150 MG CAPS     blood glucose (NO BRAND SPECIFIED) test strip     blood glucose calibration (NO BRAND  SPECIFIED) solution     blood glucose monitoring (NO BRAND SPECIFIED) meter device kit     citalopram (CELEXA) 20 MG tablet     Continuous Blood Gluc Sensor (FREESTYLE IRENE 2 SENSOR) MISC     hydrochlorothiazide (HYDRODIURIL) 25 MG tablet     insulin aspart (NOVOLOG PEN) 100 UNIT/ML pen     insulin glargine (LANTUS PEN) 100 UNIT/ML pen     insulin pen needle (31G X 8 MM) 31G X 8 MM miscellaneous     levETIRAcetam (KEPPRA) 1000 MG tablet     lisinopril (ZESTRIL) 40 MG tablet     metFORMIN (GLUCOPHAGE XR) 500 MG 24 hr tablet     methocarbamol (ROBAXIN) 500 MG tablet     oxyCODONE (ROXICODONE) 5 MG tablet     prochlorperazine (COMPAZINE) 10 MG tablet     propranolol (INDERAL) 20 MG tablet     rivaroxaban ANTICOAGULANT (XARELTO) 20 MG TABS tablet     thin (NO BRAND SPECIFIED) lancets     No current facility-administered medications for this visit.       Physical Exam:    Blood pressure (!) 153/97, pulse 68, temperature 98.7  F (37.1  C), temperature source Oral, resp. rate 18, weight 97.8 kg (215 lb 11.2 oz), SpO2 97 %.   General: alert and cooperative, sitting up in chair  HEENT: sclera anicteric, EOMI, MMM. Pupils equal and reactive.   Resp: breathing comfortably   GI: soft, non-tender, non-distended, bowel sounds present and normoactive  MSK: warm and well-perfused, normal tone  Skin: no rashes on limited exam, no jaundice  Neuro: Alert awake and oriented x4, strength is 5 on 5 throughout, sensations are grossly intact    Labs & Studies: I personally reviewed the following studies:  Most Recent 3 CBC's:  Recent Labs   Lab Test 12/29/22  1841 11/08/22  0618 11/07/22  1531   WBC 7.8 8.4 11.4*   HGB 13.4 13.6 14.3   MCV 96 96 92    170 195     Most Recent 3 BMP's:  Recent Labs   Lab Test 12/29/22  1841 11/09/22  1104 11/09/22  0731 11/09/22  0208 11/08/22  1708 11/08/22  1435 11/08/22  0813 11/08/22  0618 11/08/22  0145 11/07/22  1531     --   --   --   --   --   --  140  --  143   POTASSIUM 3.4 3.4  --    --   --  3.5  --  3.2*  --  3.6   CHLORIDE 104  --   --   --   --   --   --  103  --  104   CO2 27  --   --   --   --   --   --  23  --  28   BUN 20.4*  --   --   --   --   --   --  15.1  --  16.7   CR 0.70  --   --   --   --   --   --  0.57*  --  0.68   ANIONGAP 13  --   --   --   --   --   --  14  --  11   TORY 9.5  --   --   --   --   --   --  8.4*  --  9.6   *  --  158* 165*   < >  --    < > 147*   < > 192*    < > = values in this interval not displayed.    Most Recent 2 LFT's:  Recent Labs   Lab Test 12/29/22  1841 11/07/22  1531   AST 20 20   ALT 17 23   ALKPHOS 83 79   BILITOTAL 0.6 1.0    Most Recent TSH and T4:  Recent Labs   Lab Test 09/12/22  1642   TSH 2.56        ASSESSMENT AND PLAN:  Stage IV NSCLC, Rt lung adenocarcinoma with metastasis to pleura, mediastinum , rt pleural effusion and brain diagnosed 1/2022 (AJCC 8th edition)  PD-L1 TPS 2-3% by Pablo   NGS [facility] panel-EML4:ALK rearragement; chr2:[id number], chr2:[id number]  NGS Guardant- GNAS R201H, KRAS K5E- No ALK    He began Alectinib 600 mg BID 3/2/22 and unfortunately developed grade 3 myalgias which have improved with lowering the dose 450 mg BID. He was holding drug 4/2/22 to 4/11/22 due to MSSA infection from pleurex which is removed. Resumed at 450 mg BID. Due to ongoing myalgias (Although CK is normal), we dose reduced to 300 mg BID.   In Dec 2022, developed Gr 3 arthralgia, and we held drug since 12/20/22. Some improvement in arthralgias sicne stopping the drug. Will continue to hold the drug for now. Overall he has had a near CR to Rx in the lung, has a residual rt LL lesion.  We will give break for another 2 weeks.  Going forward we might discontinue alectinib and switch to other Rx. Other drugs like brigatinib or lorlatinib or ceritinib are options. However, the incidence of arthralgias are similar but may be numerically slightly lower. Would avoid certinib due to lower CNS efficacy compared to brigatinib or lorlatininib. Would  avoid lorlatinib due to cognitive issues given he has had similar symptoms with radiation necrosis.  Will most likely start on brigatinib. Will start at lower dose 90 mg daily and stay there for 2 weeks (instead of 1) then go up from there.    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        Plan  Will most likkarina switch to brigatinib, will work on approval  RTC with me in 2 weeks    # Grade 3 arthralgias most likley related to alectinib, now improved 25% after stopping alectinib  CK and aldolase has been normal   -All joints affected, no morning stiffness, normal ESRa nd CRP  -has tried ASA, NSAIDS, tylenol with suboptimal relief  -Now on oxycodone 5 mg every 4 hrs, which is tolerable        # PE: provoked by malignancy vs bevacizumab in 11/2022  -New right-sided pleuritic chest pain, tachycardiam, CT showing rt sided sub segmental PE wo   -- Started on lovenox , switched to rivaroxiabn 20 mg daily      # Hypertensive urgency- resolved- today BP is high, missed meds today  # G3 diastolic HTN - led to one dose hold of bevacizumab and now discontinued  Adm with /111. No evidence of end-organ damage. Most likley from Bevacizumab. Recent MRI brain revealed no PRES, stable necrotic/mets areas and no new enhancement.  - PTA HTCZ  - PTA lisinopril   -On propranolol 20mg BID  - Pt to follow up with PCP in regards to BP mgmt       # Brain mets:  # Radiation necrosis  Baseline Brain MRI with several brain mets, s/p GK to 11-12 brain mets. F/u Brain MRI in June was showing enlargement of the one of the lesions along with edema, therefore had to undergo craniotomy followed by resection, the final biopsy consistent with radiation necrosis.  Had issues with radiation necrosis and swelling requiring steroids but these were driving up blood sugars drastically  -Began bevacizumab for radiation necrosis --15 mg/kg every 3 weeks,  plan for at least 3 months of therapy.  Dexamethasone taper through 10/31. S/p 2 doses of Bevacixumab now, last one 10/26 , but had to hold it given he had HTN urgency and PE.  -Recent Brain MRI showing Rx effect and resolution of radiation necrosis, will have to watch closely given he is off of bevaciuzumab  Next Brain MRI 1/2023    # Type 2 Diabetes: Self monitoring of blood glucose is not at goal of fasting  mg/dL. Now off of dexamethasone. Started metformin 500mg ER every day    --Started using Quantum Voyage 2 continous glucose monitor  --FOllows medication management-   --on  Metformin and insulin  -- Novolog scale with correction--be sure to check blood sugars before eating and taking insulin with a meal       #grade 3 myalgias: CK has been normal. initially resolved after lowering dose though Connor recalls it has never really improved even with 300 mg BID dose  -off of rosuvastatin now    #hypokalemia: mild and he denies PO intake limitations thus will recheck with next weeks labs that are already scheduled    #normocytic anemia: sudden drop to <7, requiring 1 unit blood transfusion. Lab work up unrevealing. Has recovered to baseline  -consider EGD if hgb drops again, risk for GI bleed with chronic steroid use      #grade 2 fatigue: ongoing. Monitor any improvement with dose reduction     #MSSA infection, Sepsis at pleurex site- resolved  # Pleural effusion: s/p pleurex palcement  2/16/22. Then on 4/2 right PleurX catheter s/p removal for severe sepsis due to MSSA infection.    #dry eyes  #blurred vision  Continue artificial tears. Saw ophthalmology previosuly, changed prescription, mow improved.  No neuro sx.     #Psychiatry  # Situational Anxiety   - irritable at times, may be better off steroids. Declined referral to therapist and denies medication intervention for now. PCP can help manage this too if he has established relationship with him    #COVID vaccine: No COVID vaccine on record, did not discuss  today.       On the day of service  Chart review: 5 minutes  Visit duration: 30 minutes  Care coordination: 5 minutes    Bassam Persaud MD    Hematology, Oncology and Transplantation

## 2023-01-09 NOTE — NURSING NOTE
"Oncology Rooming Note    January 9, 2023 2:41 PM   Connor Emerson is a 42 year old male who presents for:    Chief Complaint   Patient presents with     Oncology Clinic Visit     RTN for Small Lung Cell Cancer     Initial Vitals: Blood Pressure (Abnormal) 153/97   Pulse 68   Temperature 98.7  F (37.1  C) (Oral)   Respiration 18   Weight 97.8 kg (215 lb 11.2 oz)   Oxygen Saturation 97%   Body Mass Index 31.85 kg/m   Estimated body mass index is 31.85 kg/m  as calculated from the following:    Height as of 11/16/22: 1.753 m (5' 9\").    Weight as of this encounter: 97.8 kg (215 lb 11.2 oz). Body surface area is 2.18 meters squared.  Moderate Pain (5) Comment: Data Unavailable   No LMP for male patient.  Allergies reviewed: Yes  Medications reviewed: Yes    Medications: Medication refills not needed today.  Pharmacy name entered into EPIC:    [facility] [place] - [place], [place] - [address] [facility] [place] [address] [place] [address]  [facility] [place] [id number] - [place], [place] - [address] [facility] AT [place] [address] & [id number] [place], [place] - [address] AT [facility] & [place] [id number]    Clinical concerns: none       Luisa Mclain MA            "

## 2023-01-09 NOTE — NURSING NOTE
Patient labs drawn in clinic via venipuncture. Patient tolerated well and was discharged. Specimen(s) sent to first floor lab for processing. See flowsheet for details.      Reyna Tavarez CMA on 1/9/2023 at 3:41 PM

## 2023-01-09 NOTE — PROGRESS NOTES
MEDICAL ONCOLOGY FOLLOW UP NOTE    PATIENT NAME: Connor Emerson  ENCOUNTER DATE: 1/9/2023    Care Team  Primary Oncologist: Bassam Persaud MD    REASON FOR CURRENT VISIT: F/u of lung cancer    HISTORY OF PRESENT ILLNESS:  Mr. Connor Emerson is a 42 year old  male who is a non-smoker with PMHx of T2DM, HTN with metastatic NSCLC comes for follow up     Oncologic Hx:    Diagnosis:   Stage IV NSCLC, Rt lung adenocarcinoma with metastasis to pleura, mediastinum , rt pleural effusion and brain diagnosed 1/2022 (AJCC 8th edition)  PD-L1 TPS 2-3% by [place]   NGS [facility] panel-EML4:ALK rearragement  NGS Guardant- GNAS R201H, KRAS K5E- No ALK    Treatment:   Current:  3/2/22- now- Alectinib 300 mg BID (Dose reduced to 450 mg BID from 600 mg BID due to grade 3 myalgias 3/21/22, again reduced to 300 mg BID 9/28/22)    )  Past:  2/15/22- GK to 11 briain lesions  6/28/22- Craniotomy, resection  9/28/22-10/26- Bevacizumab for radiation necrosis (stopped due to PE)    Intent of treatment: Palliative    Oncologic course:  1/19/22 to 1/22/22-Admitted to [facility] for 2 week progressive SOB secondary to have large rt sided pleural effusion, needing thoracentesis x2 (1.7L and 2.0 L removed), cytology positive for malignancy, adenocarcinoma.   1/26/22- Rt pleural mass biopsy-Dr. Agrawal--POSITIVE FOR ADENOCARCINOMA CONSISTENT WITH LUNG PRIMARY, admixed with mesothelial hyperplasia and inflammatory infiltrate (+ TTF-1 and CK 7;  negative  p40, calretinin and WT-1. PAX8 immunostain focal +). 4th thoracentesis done simultaneously - 3L approx removed.   2/1/22- PET/CT-Right lower lobe central infiltrative FDG avid 8.2 x 9.6 cm mass representing a primary lung adenocarcinoma. Ipsilateral right perihilar, bilateral pretracheal, subcarinal and superior mediastinal michele metastases. Contralateral mildly FDG avid few lung nodules are suspicious for contralateral metastasis. At least 3 intracranial metastases in the right frontal lobe, left frontal lobe  and left cerebellar hemisphere. Nonspecific mild diffuse bone marrow uptake. Further evaluation with a spine MRI could be considered to rule out early marrow infiltration. This could also be seen with red marrow conversion.  2/5/22-  Brain MRI- At least 9 intracranial metastases as detailed above. The dominant lesions involving the orbital right frontal lobe, the posterior left middle frontal gyrus, anterior right temporal lobe and in the left cerebellar hemisphere have surrounding moderate vasogenic type edema.  2/15/22- Saw Dr. Arango from Rad Onc- Rcd GK to 12 lesion in bran  2/16/22- Pleurex placement   3/2/22- Started Alectinib 600 mg BID  3/21/22- Dose reduced to 450 mg BID due to grade 3 myalgias and fatigue  4/2/22 to 4/5/22- Admitted at [facility] for- Severe sepsis due to MSSA infection of right PleurX catheter s/p removal- He presented with onset of pain at tube site starting 4/1; at arrival was tachycardic with leukocytosis (22.7) and elevated lactic acid (2.9).  CT chest showed fluid and stranding tracking outside the pleural space into chest wall along pleural catheter.  IR was consulted and removed catheter 4/2 with report of pustular drainage and tip culture growing MSSA.  Thoracic Surg was consulted who felt no surgical indication necessary given minimal pleural fluid and lack of any signs of abscess.  Initially treated with broad spectrum coverage for sepsis, narrowed to Ancef once sensitivities returned with plan to transition to cefadroxil for an additional 10 days at discharge per ID. Held drug 4/2 to 4/11 5/2/22- CT CAP- Overall, positive response to therapy with decreased size of right lower lobe and right pleural-based masses, pulmonary metastases, hilar and mediastinal lymphadenopathy. However, a single right posterior pleural-based mass has slightly increased in size since 2/24/2022. No metastatic disease in the abdomen and pelvis. Right Pleurx catheter has been removed. Trace right  pleural effusion and right basilar atelectasis.  5/2/22- Brain MRI- The previously demonstrated brain metastases are mildly diminished in size versus to 2/5/2022. The degree of edema is also diminished but not completely resolved. Probable trace amounts of intralesional bleeding demonstrated on the gradient sequence within the metastases. No definite new metastasis or progressive mass effect. No hydrocephalus or infarct.    6/15/22 to 6/17/22- Admitted at [facility]-with aphasia and word finding difficulty over last few weeks.  He presented to [facility] ED on 6/10 for evaluation of his symptoms. MRI brain showed multiple intracranial metastases, with interval enlargement of the dominant lesion within the left frontal lobe and increased surrounding vasogenic edema with 2 mm rightward shift of the septum pellucidum. Due to his worsening anxiety, he left AMA. His symptoms continued to progress to where he could not write at work so he decided to go to the ED for re-evaluation and treatment. Evaluated by NSGY, Rad Onc (radiaiton necrosis vs tumor progression).  6/16/22- MR Brain (6/16) shows multiple intracranial metastases, with interval enlargement  of the dominant lesion within the left frontal lobe and increased surrounding vasogenic edema with 2 mm rightward shift of the septum pellucidum.  6/16/22- - CT CAP shows slightly decreased size of right lower lobe and right pleural-based masses. No new pulmonary nodules or lymphadenopathy; No evidence of metastatic disease in the abdomen or pelvis.   6/28/22 to 6/30/22- Admitted at [facility]- Elective left Stealth craniotomy with resection of brain tumor due to ongoing symptoms. No intraoperative complications. EBL 50 ml.  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  7/5/22- Ct CAP- Right lower lobe low-density nodules are not significantly changed. A small left upper lobe pulmonary nodule is also unchanged. Trace pleural fluid on the right has increased slightly. No  convincing evidence for metastatic disease in the abdomen or pelvis.  7/18/22 to 7/19/22- Admitted to [facility] for seizure- Reportedly was only taking once Keppra instead of twice daily. Also resumed on dexamethasone 2 mg daily  8/1/22- Brain MRI- Redemonstrated postsurgical changes status post left frontoparietal Craniotomy. Interval increase in size of the dominant ring-enhancing lesion in the left posterior superior frontal lobe with increased moderate surrounding vasogenic edema and local mass effect resulting in narrowing of the supratentorial ventricular system. No significant midline shift/herniation at this time  8/1/22- Dex was increased to 4 mg daily by Dr. Moran  9/1/22- CT Chest- Near resolution of previously seen right pleural nodule. Stable right lower lobe pulmonary nodule  9/28/22- Bevacizumab for radiation necrosis  10/26/22- Bevacizumab   10/26/22- Ct CAP- Stable posterior medial right lower lobe 1.9 x 1.1 cm nodule series 8 image 176. Adjacent stable scarring and atelectasis. The previously noted pleural nodule posteriorly on the right is not currently clearly identified. Stable left upper lobe 0.3 cm nodule image 56  10/26/22- Brain MRI- Overall improved appearance of multiple intracranial metastases with near resolution of associated edema and diminished enhancement and size of multiple residual lesions. No definite new or progressive metastasis.  11/7/22 to 11/9/22- Admitted for PE and HTN urgency- Small pulmonary embolism in the right lower lobe pulmonary artery. started on Lovenox, Brain MRI neg for PRES.  12/29/22- ED visit- bilateral hip pain, pain in shoulders, knuckles, knees, and ankles- holding alectinib since 12/20/22 1/6/23- Ct CAP- Mild groundglass nodularity in the left upper lobe is new since the previous exam, and may be infectious in etiology. No other significant interval change. Pulmonary nodules are not significantly changed.  1/6/23- Brain MRI- Stable to diminished sequelae of  intracranial metastasis and treatment changes. No new or progressive metastasis. No superimposed acute intracranial finding.        He works as a maintenance manger for apartment complexes    Interval Hx:  Connor is here to follow up.Was in ER end of Dec for 1-2 weeks of bilateral hip pain, pain in shoulders, knuckles, knees, and ankles- holding alectinib since 12/20/22  Work up was neg  The arthralgia Started one month ago  Has moderate to severe Pain in the joints, all llarge and small jopints (nearly every single joints)  Joints in the hands, shoulders, toes, hips and knees  He has been doing heat packs and has tried asa, tylenol, ibupfren ( 6 times daily)  Takes oxycodone 4 times daily which helps with the pain, able to manage the pain  Stopped alectinib 1 weeks, notices about 25% improvement  Everything else is ok  He continues to work daily  Recently noticed cough is worse than usual, bringing up clear sputum  No fever or chills  Breathing is stable, no cough, no fever or chills.  Able to eat and drink well  Independent of ADL and IADL      REVIEW OF SYSTEMS: 14 point ROS negative other than the symptoms noted above in the HPI.    Wt Readings from Last 4 Encounters:   12/29/22 97.5 kg (215 lb)   11/22/22 99.7 kg (219 lb 12.8 oz)   11/16/22 94.8 kg (209 lb)   11/09/22 94.9 kg (209 lb 4.8 oz)      Review of Systems:  A comprehensive ROS was performed and found to be negative or non-contributory with the exception of that noted in the HPI above.    Past Medical History:  GERD  Hypertension, not on medication  Type 2 diabetes mellitus, not on medications currently, previously on Metformin    Past Surgical History:  Past Surgical History:   Procedure Laterality Date     BRONCHOSCOPY RIGID OR FLEXIBLE W/TRANSENDOSCOPIC ENDOBRONCHIAL ULTRASOUND GUIDED Bilateral 1/26/2022    Procedure: Right BRONCHOSCOPY, FIBEROPTIC, endobronchial ultrasound, pleural biopsy;  Surgeon: Dallin Agrawal MD;  Location: [facility] OR     [facility]  BLOCK MEDIAL BRANCH CERVICAL/THORACIC/LUMBAR       INSERT CHEST TUBE Right 2022    Procedure: INSERTION, CATHETER, INTERCOSTAL, FOR DRAINAGE;  Surgeon: Dallin Agrawal MD;  Location: [facility] GI     INSERT CHEST TUBE Right 3/9/2022    Procedure: INSERTION, CATHETER, INTERCOSTAL, FOR DRAINAGE;  Surgeon: Sushila Antonio MD;  Location: [facility] GI     IR CHEST TUBE REMOVAL TUNNELED RIGHT  2022     OPTICAL TRACKING SYSTEM CRANIOTOMY, EXCISE TUMOR, COMBINED Left 2022    Procedure: Left stealth craniotomy for tumor resection with motor mapping;  Surgeon: Stephen Moran MD;  Location:  OR     ORTHOPEDIC SURGERY      Ganesh. Rotator cuff repair.     PLEUROSCOPY N/A 2022    Procedure: Pleuroscopy with Pleural Biopsy;  Surgeon: Dallin Agrawal MD;  Location:  OR       Social History:  Lives with wife and 4 kids in [place]. Works as a  for an apartment complex in [place]. Exposure to household chemicals and . No significant exposure to asbestos. No signal exposure to benzene or similar chemicals. No significant smoking history-states that he smoked 1 to 2 cigarettes occasionally per month for about 2 years in college, non-smoking since then. No significant alcohol use history. No other recreational substances. Good support system. Kids are 23, 19, 17 and 13.    Family History  Significant history for cancers on maternal side. Mother  of uterine cancer. 2 maternal uncles have possible metastatic melanoma.    Outpatient Medications:  Current Outpatient Medications   Medication     alcohol swab prep pads     alectinib (ALECENSA) 150 MG CAPS     blood glucose (NO BRAND SPECIFIED) test strip     blood glucose calibration (NO BRAND SPECIFIED) solution     blood glucose monitoring (NO BRAND SPECIFIED) meter device kit     citalopram (CELEXA) 20 MG tablet     Continuous Blood Gluc Sensor (FREESTYLE IRENE 2 SENSOR) MISC     hydrochlorothiazide (HYDRODIURIL) 25 MG tablet     insulin  aspart (NOVOLOG PEN) 100 UNIT/ML pen     insulin glargine (LANTUS PEN) 100 UNIT/ML pen     insulin pen needle (31G X 8 MM) 31G X 8 MM miscellaneous     levETIRAcetam (KEPPRA) 1000 MG tablet     lisinopril (ZESTRIL) 40 MG tablet     metFORMIN (GLUCOPHAGE XR) 500 MG 24 hr tablet     methocarbamol (ROBAXIN) 500 MG tablet     oxyCODONE (ROXICODONE) 5 MG tablet     prochlorperazine (COMPAZINE) 10 MG tablet     propranolol (INDERAL) 20 MG tablet     rivaroxaban ANTICOAGULANT (XARELTO) 20 MG TABS tablet     thin (NO BRAND SPECIFIED) lancets     No current facility-administered medications for this visit.       Physical Exam:    Blood pressure (!) 153/97, pulse 68, temperature 98.7  F (37.1  C), temperature source Oral, resp. rate 18, weight 97.8 kg (215 lb 11.2 oz), SpO2 97 %.   General: alert and cooperative, sitting up in chair  HEENT: sclera anicteric, EOMI, MMM. Pupils equal and reactive.   Resp: breathing comfortably   GI: soft, non-tender, non-distended, bowel sounds present and normoactive  MSK: warm and well-perfused, normal tone  Skin: no rashes on limited exam, no jaundice  Neuro: Alert awake and oriented x4, strength is 5 on 5 throughout, sensations are grossly intact    Labs & Studies: I personally reviewed the following studies:  Most Recent 3 CBC's:  Recent Labs   Lab Test 12/29/22  1841 11/08/22  0618 11/07/22  1531   WBC 7.8 8.4 11.4*   HGB 13.4 13.6 14.3   MCV 96 96 92    170 195     Most Recent 3 BMP's:  Recent Labs   Lab Test 12/29/22  1841 11/09/22  1104 11/09/22  0731 11/09/22  0208 11/08/22  1708 11/08/22  1435 11/08/22  0813 11/08/22  0618 11/08/22  0145 11/07/22  1531     --   --   --   --   --   --  140  --  143   POTASSIUM 3.4 3.4  --   --   --  3.5  --  3.2*  --  3.6   CHLORIDE 104  --   --   --   --   --   --  103  --  104   CO2 27  --   --   --   --   --   --  23  --  28   BUN 20.4*  --   --   --   --   --   --  15.1  --  16.7   CR 0.70  --   --   --   --   --   --  0.57*  --  0.68    ANIONGAP 13  --   --   --   --   --   --  14  --  11   TORY 9.5  --   --   --   --   --   --  8.4*  --  9.6   *  --  158* 165*   < >  --    < > 147*   < > 192*    < > = values in this interval not displayed.    Most Recent 2 LFT's:  Recent Labs   Lab Test 12/29/22  1841 11/07/22  1531   AST 20 20   ALT 17 23   ALKPHOS 83 79   BILITOTAL 0.6 1.0    Most Recent TSH and T4:  Recent Labs   Lab Test 09/12/22  1642   TSH 2.56        ASSESSMENT AND PLAN:  Stage IV NSCLC, Rt lung adenocarcinoma with metastasis to pleura, mediastinum , rt pleural effusion and brain diagnosed 1/2022 (AJCC 8th edition)  PD-L1 TPS 2-3% by [place]   NGS [facility] panel-EML4:ALK rearragement; chr2:[id number], chr2:[id number]  NGS Guardant- GNAS R201H, KRAS K5E- No ALK    He began Alectinib 600 mg BID 3/2/22 and unfortunately developed grade 3 myalgias which have improved with lowering the dose 450 mg BID. He was holding drug 4/2/22 to 4/11/22 due to MSSA infection from pleurex which is removed. Resumed at 450 mg BID. Due to ongoing myalgias (Although CK is normal), we dose reduced to 300 mg BID.   In Dec 2022, developed Gr 3 arthralgia, and we held drug since 12/20/22. Some improvement in arthralgias sicne stopping the drug. Will continue to hold the drug for now. Overall he has had a near CR to Rx in the lung, has a residual rt LL lesion.  We will give break for another 2 weeks.  Going forward we might discontinue alectinib and switch to other Rx. Other drugs like brigatinib or lorlatinib or ceritinib are options. However, the incidence of arthralgias are similar but may be numerically slightly lower. Would avoid certinib due to lower CNS efficacy compared to brigatinib or lorlatininib. Would avoid lorlatinib due to cognitive issues given he has had similar symptoms with radiation necrosis.  Will most likely start on brigatinib. Will start at lower dose 90 mg daily and stay there for 2 weeks (instead of 1) then go up from there.    Overall  prognosis for ALK rearrangement lung cancer is median overall survival > 7 years based on the most recent update of the WINSOME study.        Plan  Will most andre switch to brigatinib, will work on approval  RTC with me in 2 weeks    # Grade 3 arthralgias most likley related to alectinib, now improved 25% after stopping alectinib  CK and aldolase has been normal   -All joints affected, no morning stiffness, normal ESRa nd CRP  -has tried ASA, NSAIDS, tylenol with suboptimal relief  -Now on oxycodone 5 mg every 4 hrs, which is tolerable        # PE: provoked by malignancy vs bevacizumab in 11/2022  -New right-sided pleuritic chest pain, tachycardiam, CT showing rt sided sub segmental PE wo   -- Started on lovenox , switched to rivaroxiabn 20 mg daily      # Hypertensive urgency- resolved- today BP is high, missed meds today  # G3 diastolic HTN - led to one dose hold of bevacizumab and now discontinued  Adm with /111. No evidence of end-organ damage. Most andre from Bevacizumab. Recent MRI brain revealed no PRES, stable necrotic/mets areas and no new enhancement.  - PTA HTCZ  - PTA lisinopril   -On propranolol 20mg BID  - Pt to follow up with PCP in regards to BP mgmt       # Brain mets:  # Radiation necrosis  Baseline Brain MRI with several brain mets, s/p GK to 11-12 brain mets. F/u Brain MRI in June was showing enlargement of the one of the lesions along with edema, therefore had to undergo craniotomy followed by resection, the final biopsy consistent with radiation necrosis.  Had issues with radiation necrosis and swelling requiring steroids but these were driving up blood sugars drastically  -Began bevacizumab for radiation necrosis --15 mg/kg every 3 weeks, plan for at least 3 months of therapy.  Dexamethasone taper through 10/31. S/p 2 doses of Bevacixumab now, last one 10/26 , but had to hold it given he had HTN urgency and PE.  -Recent Brain MRI showing Rx effect and resolution of radiation necrosis,  will have to watch closely given he is off of bevaciuzumab  Next Brain MRI 1/2023    # Type 2 Diabetes: Self monitoring of blood glucose is not at goal of fasting  mg/dL. Now off of dexamethasone. Started metformin 500mg ER every day    --Started using Mohan 2 continous glucose monitor  --FOllows medication management-   --on  Metformin and insulin  -- Novolog scale with correction--be sure to check blood sugars before eating and taking insulin with a meal       #grade 3 myalgias: CK has been normal. initially resolved after lowering dose though Connor recalls it has never really improved even with 300 mg BID dose  -off of rosuvastatin now    #hypokalemia: mild and he denies PO intake limitations thus will recheck with next weeks labs that are already scheduled    #normocytic anemia: sudden drop to <7, requiring 1 unit blood transfusion. Lab work up unrevealing. Has recovered to baseline  -consider EGD if hgb drops again, risk for GI bleed with chronic steroid use      #grade 2 fatigue: ongoing. Monitor any improvement with dose reduction     #MSSA infection, Sepsis at pleurex site- resolved  # Pleural effusion: s/p pleurex palcement  2/16/22. Then on 4/2 right PleurX catheter s/p removal for severe sepsis due to MSSA infection.    #dry eyes  #blurred vision  Continue artificial tears. Saw ophthalmology previosuly, changed prescription, mow improved.  No neuro sx.     #Psychiatry  # Situational Anxiety   - irritable at times, may be better off steroids. Declined referral to therapist and denies medication intervention for now. PCP can help manage this too if he has established relationship with him    #COVID vaccine: No COVID vaccine on record, did not discuss today.       On the day of service  Chart review: 5 minutes  Visit duration: 30 minutes  Care coordination: 5 minutes    Bassam Persaud MD    Hematology, Oncology and Transplantation

## 2023-01-11 ENCOUNTER — TELEPHONE (OUTPATIENT)
Dept: ONCOLOGY | Facility: CLINIC | Age: 43
End: 2023-01-11

## 2023-01-11 DIAGNOSIS — C34.90 NON-SMALL CELL LUNG CANCER (NSCLC) (H): Primary | ICD-10-CM

## 2023-01-11 NOTE — TELEPHONE ENCOUNTER
PA Initiation    Medication: Alunbrig 30mg and 180mg PA Pending  Insurance Company: [employer] [place] - Phone [phone] Fax [phone]  Pharmacy Filling the Rx:    Filling Pharmacy Phone:    Filling Pharmacy Fax:    Start Date: 1/11/2023    Form being faxed - once pa is completed will have to to exception then will have to do the same for the 180 dose.  Faxed to [phone] 1:24pm 1/11     Juliette Marquez CPhT  [facility]  Oncology Pharmacy Liaison II  [email or web address]  [phone] (phone  [phone] (fax

## 2023-01-13 NOTE — TELEPHONE ENCOUNTER
[place]   :     Juliette Marquez CPhT  [facility]  Oncology Pharmacy Liaison II  [email or web address]  [phone] (phone  [phone] (fax

## 2023-01-16 ENCOUNTER — TELEPHONE (OUTPATIENT)
Dept: ONCOLOGY | Facility: CLINIC | Age: 43
End: 2023-01-16

## 2023-01-16 DIAGNOSIS — Z79.899 ENCOUNTER FOR LONG-TERM (CURRENT) USE OF MEDICATIONS: Primary | ICD-10-CM

## 2023-01-16 DIAGNOSIS — C34.31 PRIMARY MALIGNANT NEOPLASM OF RIGHT LOWER LOBE OF LUNG (H): ICD-10-CM

## 2023-01-16 DIAGNOSIS — C79.31 BRAIN METASTASIS: ICD-10-CM

## 2023-01-16 NOTE — TELEPHONE ENCOUNTER
Prior Authorization Approval    Authorization Effective Date: 1/1/2023  Authorization Expiration Date: 1/14/2024  Medication: Alunbrig 30mg and 180mg PA Approved  Approved Dose/Quantity: 30mg ramp up 105/28 and 180mg 30/30  Reference #: [id number]   Insurance Company: [facility] - Phone [phone] Fax [phone]  Expected CoPay: $3     CoPay Card Available:      Foundation Assistance Needed:    Which Pharmacy is filling the prescription (Not needed for infusion/clinic administered): [facility] - [place], [place] - [address]  Pharmacy Notified: Yes  Patient Notified:          Juliette Marquez CPhT  [facility]  Oncology Pharmacy Liaison II  [email or web address]  [phone] (phone  [phone] (fax

## 2023-01-16 NOTE — ORAL ONC MGMT
Oral Chemotherapy Monitoring Program    Lab Monitoring Plan  CBC, CMP, CK total, Amylase, Lipase qmonth and prn   Subjective/Objective:  Connor Emerson is a 42 year old male contacted by phone for an initial visit for oral chemotherapy education.      ORAL CHEMOTHERAPY 11/21/2022 12/23/2022 12/29/2022 12/30/2022 1/11/2023 1/11/2023 1/16/2023   Assessment Type Refill Left Voicemail;Monthly Follow up Monthly Follow up Other Discontinuation Initial Work up New Teach   Stop Date - - - - 1/11/2023 - -   Reason for Discontinuation - - - - Unacceptable toxicity - -   Diagnosis Code Non-Small Cell Lung Cancer Non-Small Cell Lung Cancer Non-Small Cell Lung Cancer Non-Small Cell Lung Cancer Non-Small Cell Lung Cancer Non-Small Cell Lung Cancer Non-Small Cell Lung Cancer   Providers Dr Cori Persaud   [facility]   Drug Name Alecensa (alectinib) Alecensa (alectinib) Alecensa (alectinib) Alecensa (alectinib) Alecensa (alectinib) Alunbrig (brigatinib) Alunbrig (brigatinib)   Dose 300 mg 300 mg 300 mg 300 mg - Other: Other:   Current Schedule BID BID BID BID - Daily Daily   Cycle Details Continuous Continuous Continuous Drug on Hold - Continuous Continuous   Start Date of Last Cycle - - - - - - -   Planned next cycle start date - - - - - 2/1/2023 2/1/2023   Doses missed in last 2 weeks - - 0 - - - -   Adherence Assessment - - Adherent - - - -   Adverse Effects - - Myalgias/Arthralgias - - - -   Myalgias/Arthralgias - - Grade 3 - - - -   Pharmacist Intervention(myalgias/arthralgias) - - Yes - - - -   Intervention(s) - - Referral to emergency/urgent care;Patient education - - - -   Pharmacist Intervention(anemia) - - - - - - -   Intervention(s) - - - - - - -   Other (See Note for Details) - - - - - - -   Pharmacist intervention(other) - - - - - - -   Intervention(s) - - - - - - -   Any new drug  "interactions? - - No - - - Yes   Pharmacist Intervention? - - - - - - Yes   Intervention(s) - - - - - - Patient Education   Is the dose as ordered appropriate for the patient? - - Yes - - - Yes   Is the patient currently in pain? - - Yes - - - -   Does the patient feel the pain is currently being managed by a provider? - - No - - - -   Has the patient been assessed within the past 6 months for depression? - - No - - - -   Has the patient missed any days of school, work, or other routine activity? - - Yes - - - -   Days missed in the past month: - - More than 5 days - - - -   Since the last time we talked, have you been hospitalized or used the emergency room? - - Yes - - - -   What medical service did you use? - - Emergency Room - - - -       Last PHQ-2 Score on record:   PHQ-2 ( 1999 [employer]) 6/27/2022 2/24/2022   Q1: Little interest or pleasure in doing things 0 0   Q2: Feeling down, depressed or hopeless 0 0   PHQ-2 Score 0 0   PHQ-2 Total Score (12-17 Years)- Positive if 3 or more points; Administer PHQ-A if positive - -   Q1: Little interest or pleasure in doing things Not at all Not at all   Q2: Feeling down, depressed or hopeless Not at all Not at all   PHQ-2 Score 0 0       Vitals:  BP:   BP Readings from Last 1 Encounters:   01/09/23 (!) 153/97     Wt Readings from Last 1 Encounters:   01/09/23 97.8 kg (215 lb 11.2 oz)     Estimated body surface area is 2.18 meters squared as calculated from the following:    Height as of 11/16/22: 1.753 m (5' 9\").    Weight as of 1/9/23: 97.8 kg (215 lb 11.2 oz).    Labs:  _  Result Component Current Result Ref Range   Sodium 144 (12/29/2022) 136 - 145 mmol/L   _  Result Component Current Result Ref Range   Potassium 3.4 (12/29/2022) 3.4 - 5.3 mmol/L   _  Result Component Current Result Ref Range   Calcium 9.5 (12/29/2022) 8.6 - 10.0 mg/dL   _  Result Component Current Result Ref Range   Albumin 4.5 (12/29/2022) 3.5 - 5.2 g/dL   _  Result Component Current Result Ref Range "   Urea Nitrogen 20.4 (H) (12/29/2022) 6.0 - 20.0 mg/dL   _  Result Component Current Result Ref Range   Creatinine 0.70 (12/29/2022) 0.67 - 1.17 mg/dL   _  Result Component Current Result Ref Range   AST 20 (12/29/2022) 10 - 50 U/L   _  Result Component Current Result Ref Range   ALT 17 (12/29/2022) 10 - 50 U/L   _  Result Component Current Result Ref Range   Bilirubin Total 0.6 (12/29/2022) <=1.2 mg/dL   _  Result Component Current Result Ref Range   WBC Count 9.5 (1/9/2023) 4.0 - 11.0 10e3/uL   _  Result Component Current Result Ref Range   Hemoglobin 14.1 (1/9/2023) 13.3 - 17.7 g/dL   _  Result Component Current Result Ref Range   Platelet Count 238 (1/9/2023) 150 - 450 10e3/uL   _  Result Component Current Result Ref Range   Absolute Neutrophils 6.0 (1/9/2023) 1.6 - 8.3 10e3/uL      Assessment:  Patient is appropriate to start therapy.    Plan:  Basic chemotherapy teaching was reviewed with the patient including indication, start date of therapy, dose, administration, adverse effects, missed doses, food and drug interactions, monitoring, side effect management, office contact information, and safe handling. Written materials were provided and all questions answered. Patient is aware not to start until about 1 week after Dr. Persaud appointment and aware medication will not be released until after the appointment.     Follow-Up:  1/25/2023: Dr. Persaud appointment and then release medication to specialty pharmacy     Celso Nichols, PharmD  Hematology/Oncology Clinical Pharmacist  [facility]  [facility]

## 2023-01-19 ENCOUNTER — TELEPHONE (OUTPATIENT)
Dept: ONCOLOGY | Facility: CLINIC | Age: 43
End: 2023-01-19

## 2023-01-19 ENCOUNTER — HOSPITAL ENCOUNTER (OUTPATIENT)
Dept: GENERAL RADIOLOGY | Facility: CLINIC | Age: 43
Discharge: HOME | End: 2023-01-19
Attending: STUDENT IN AN ORGANIZED HEALTH CARE EDUCATION/TRAINING PROGRAM | Admitting: STUDENT IN AN ORGANIZED HEALTH CARE EDUCATION/TRAINING PROGRAM
Payer: COMMERCIAL

## 2023-01-19 DIAGNOSIS — R10.9 RIGHT LATERAL ABDOMINAL PAIN: Primary | ICD-10-CM

## 2023-01-19 DIAGNOSIS — R10.9 RIGHT LATERAL ABDOMINAL PAIN: ICD-10-CM

## 2023-01-19 PROCEDURE — 71046 X-RAY EXAM CHEST 2 VIEWS: CPT

## 2023-01-19 NOTE — TELEPHONE ENCOUNTER
Oncology Nurse Triage - Pain    Situation: Connor reporting the following symptoms: Pain    Background:   Treating Provider: Dr. Persaud    Date of last office visit: 1/9/23 Dr Persaud    Recent Treatments: 10/26/22 D1C2 bevacizumab    Assessment:     Location: below right arm pit  Onset; 2 days  Duration/Frequency:continuous sharp pain  Rates: 5/10  Quality: sharp  Current med(s) used:  Oxycodone taking about 4 tablets while awake.   Worsens or relieves with: If lean to right can feel it more, feels it when sleeping and can feel with deep breaths  Pain does not interfered with ADL's.   Pt is still on Xarelto.     Denies fevers/chills, cough, sore throat, chest pain, SOB, headache, n/v, bowel & bladder issues, abdominal pain.     Recommendations:   Instructed patient to seek care immediately for worsening symptoms, including: fever, chest pain, shortness of breath, dizziness.    [id number] Paged Dr. Persaud  [id number] Per Dr. Persaud, ARIELLE for Chest X-ray 2 views. Pt to continue with Oxycodone as needed.     [id number]  Scheduling request sent to call patient.     [phone] confirmed pt contacted and scheduled for Chest X-ray.

## 2023-01-24 NOTE — PROGRESS NOTES
MEDICAL ONCOLOGY FOLLOW UP NOTE    PATIENT NAME: Connor Emerson  ENCOUNTER DATE: 1/25/2023    Care Team  Primary Oncologist: Bassam Persaud MD    REASON FOR CURRENT VISIT: F/u of lung cancer    HISTORY OF PRESENT ILLNESS:  Mr. Connor Emerson is a 42 year old  male who is a non-smoker with PMHx of T2DM, HTN with metastatic NSCLC comes for follow up     Oncologic Hx:    Diagnosis:   Stage IV NSCLC, Rt lung adenocarcinoma with metastasis to pleura, mediastinum , rt pleural effusion and brain diagnosed 1/2022 (AJCC 8th edition)  PD-L1 TPS 2-3% by [facility]   NGS [facility] panel-EML4:ALK rearragement  NGS Guardant- GNAS R201H, KRAS K5E- No ALK    Treatment:   Current:  3/2/22- now- Alectinib 300 mg BID (Dose reduced to 450 mg BID from 600 mg BID due to grade 3 myalgias 3/21/22, again reduced to 300 mg BID 9/28/22)    )  Past:  2/15/22- GK to 11 briain lesions  6/28/22- Craniotomy, resection  9/28/22-10/26- Bevacizumab for radiation necrosis (stopped due to PE)    Intent of treatment: Palliative    Oncologic course:  1/19/22 to 1/22/22-Admitted to [facility] for 2 week progressive SOB secondary to have large rt sided pleural effusion, needing thoracentesis x2 (1.7L and 2.0 L removed), cytology positive for malignancy, adenocarcinoma.   1/26/22- Rt pleural mass biopsy-Dr. Agrawal--POSITIVE FOR ADENOCARCINOMA CONSISTENT WITH LUNG PRIMARY, admixed with mesothelial hyperplasia and inflammatory infiltrate (+ TTF-1 and CK 7;  negative  p40, calretinin and WT-1. PAX8 immunostain focal +). 4th thoracentesis done simultaneously - 3L approx removed.   2/1/22- PET/CT-Right lower lobe central infiltrative FDG avid 8.2 x 9.6 cm mass representing a primary lung adenocarcinoma. Ipsilateral right perihilar, bilateral pretracheal, subcarinal and superior mediastinal michele metastases. Contralateral mildly FDG avid few lung nodules are suspicious for contralateral metastasis. At least 3 intracranial metastases in the right frontal lobe, left frontal lobe  and left cerebellar hemisphere. Nonspecific mild diffuse bone marrow uptake. Further evaluation with a spine MRI could be considered to rule out early marrow infiltration. This could also be seen with red marrow conversion.  2/5/22-  Brain MRI- At least 9 intracranial metastases as detailed above. The dominant lesions involving the orbital right frontal lobe, the posterior left middle frontal gyrus, anterior right temporal lobe and in the left cerebellar hemisphere have surrounding moderate vasogenic type edema.  2/15/22- Saw Dr. Arango from Rad Onc- Rcd GK to 12 lesion in bran  2/16/22- Pleurex placement   3/2/22- Started Alectinib 600 mg BID  3/21/22- Dose reduced to 450 mg BID due to grade 3 myalgias and fatigue  4/2/22 to 4/5/22- Admitted at [facility] for- Severe sepsis due to MSSA infection of right PleurX catheter s/p removal- He presented with onset of pain at tube site starting 4/1; at arrival was tachycardic with leukocytosis (22.7) and elevated lactic acid (2.9).  CT chest showed fluid and stranding tracking outside the pleural space into chest wall along pleural catheter.  IR was consulted and removed catheter 4/2 with report of pustular drainage and tip culture growing MSSA.  Thoracic Surg was consulted who felt no surgical indication necessary given minimal pleural fluid and lack of any signs of abscess.  Initially treated with broad spectrum coverage for sepsis, narrowed to Ancef once sensitivities returned with plan to transition to cefadroxil for an additional 10 days at discharge per ID. Held drug 4/2 to 4/11 5/2/22- CT CAP- Overall, positive response to therapy with decreased size of right lower lobe and right pleural-based masses, pulmonary metastases, hilar and mediastinal lymphadenopathy. However, a single right posterior pleural-based mass has slightly increased in size since 2/24/2022. No metastatic disease in the abdomen and pelvis. Right Pleurx catheter has been removed. Trace right  pleural effusion and right basilar atelectasis.  5/2/22- Brain MRI- The previously demonstrated brain metastases are mildly diminished in size versus to 2/5/2022. The degree of edema is also diminished but not completely resolved. Probable trace amounts of intralesional bleeding demonstrated on the gradient sequence within the metastases. No definite new metastasis or progressive mass effect. No hydrocephalus or infarct.    6/15/22 to 6/17/22- Admitted at [facility]-with aphasia and word finding difficulty over last few weeks.  He presented to [facility] ED on 6/10 for evaluation of his symptoms. MRI brain showed multiple intracranial metastases, with interval enlargement of the dominant lesion within the left frontal lobe and increased surrounding vasogenic edema with 2 mm rightward shift of the septum pellucidum. Due to his worsening anxiety, he left AMA. His symptoms continued to progress to where he could not write at work so he decided to go to the ED for re-evaluation and treatment. Evaluated by NSGY, Rad Onc (radiaiton necrosis vs tumor progression).  6/16/22- MR Brain (6/16) shows multiple intracranial metastases, with interval enlargement  of the dominant lesion within the left frontal lobe and increased surrounding vasogenic edema with 2 mm rightward shift of the septum pellucidum.  6/16/22- - CT CAP shows slightly decreased size of right lower lobe and right pleural-based masses. No new pulmonary nodules or lymphadenopathy; No evidence of metastatic disease in the abdomen or pelvis.   6/28/22 to 6/30/22- Admitted at [facility]- Elective left Stealth craniotomy with resection of brain tumor due to ongoing symptoms. No intraoperative complications. EBL 50 ml.  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  7/5/22- Ct CAP- Right lower lobe low-density nodules are not significantly changed. A small left upper lobe pulmonary nodule is also unchanged. Trace pleural fluid on the right has increased slightly. No  convincing evidence for metastatic disease in the abdomen or pelvis.  7/18/22 to 7/19/22- Admitted to [facility] for seizure- Reportedly was only taking once Keppra instead of twice daily. Also resumed on dexamethasone 2 mg daily  8/1/22- Brain MRI- Redemonstrated postsurgical changes status post left frontoparietal Craniotomy. Interval increase in size of the dominant ring-enhancing lesion in the left posterior superior frontal lobe with increased moderate surrounding vasogenic edema and local mass effect resulting in narrowing of the supratentorial ventricular system. No significant midline shift/herniation at this time  8/1/22- Dex was increased to 4 mg daily by Dr. Moran  9/1/22- CT Chest- Near resolution of previously seen right pleural nodule. Stable right lower lobe pulmonary nodule  9/28/22- Bevacizumab for radiation necrosis  10/26/22- Bevacizumab   10/26/22- Ct CAP- Stable posterior medial right lower lobe 1.9 x 1.1 cm nodule series 8 image 176. Adjacent stable scarring and atelectasis. The previously noted pleural nodule posteriorly on the right is not currently clearly identified. Stable left upper lobe 0.3 cm nodule image 56  10/26/22- Brain MRI- Overall improved appearance of multiple intracranial metastases with near resolution of associated edema and diminished enhancement and size of multiple residual lesions. No definite new or progressive metastasis.  11/7/22 to 11/9/22- Admitted for PE and HTN urgency- Small pulmonary embolism in the right lower lobe pulmonary artery. started on Lovenox, Brain MRI neg for PRES.  12/29/22- ED visit- bilateral hip pain, pain in shoulders, knuckles, knees, and ankles- holding alectinib since 12/20/22 1/6/23- Ct CAP- Mild groundglass nodularity in the left upper lobe is new since the previous exam, and may be infectious in etiology. No other significant interval change. Pulmonary nodules are not significantly changed.  1/6/23- Brain MRI- Stable to diminished sequelae of  intracranial metastasis and treatment changes. No new or progressive metastasis. No superimposed acute intracranial finding.        He works as a maintenance manger for apartment complexes    Interval Hx:  Connor is here to follow up. He got CXR last week for new symptoms of pain below rt arm pit which was unremarkable,  He continues to have the diffuse MSK painm is about 25% better since holding alctinib last month  He has bilateral hip pain, pain in shoulders, knuckles, knees, and ankles- holding alectinib since 12/20/22  Has moderate to severe Pain in the joints, all llarge and small jopints (nearly every single joints)  Joints in the hands, shoulders, toes, hips and knees  He has been doing heat packs and has tried asa, tylenol, ibupfren ( 6 times daily) with no benfit  Takes oxycodone 4 times daily which helps with the pain, able to manage the pain with some benefit  Everything else is ok  He continues to work daily  Recently noticed cough is worse than usual, bringing up clear sputum  No fever or chills  Breathing is stable, no cough, no fever or chills.  Able to eat and drink well  Independent of ADL and IADL      REVIEW OF SYSTEMS: 14 point ROS negative other than the symptoms noted above in the HPI.    Wt Readings from Last 4 Encounters:   01/09/23 97.8 kg (215 lb 11.2 oz)   12/29/22 97.5 kg (215 lb)   11/22/22 99.7 kg (219 lb 12.8 oz)   11/16/22 94.8 kg (209 lb)      Review of Systems:  A comprehensive ROS was performed and found to be negative or non-contributory with the exception of that noted in the HPI above.    Past Medical History:  GERD  Hypertension, not on medication  Type 2 diabetes mellitus, not on medications currently, previously on Metformin    Past Surgical History:  Past Surgical History:   Procedure Laterality Date     BRONCHOSCOPY RIGID OR FLEXIBLE W/TRANSENDOSCOPIC ENDOBRONCHIAL ULTRASOUND GUIDED Bilateral 1/26/2022    Procedure: Right BRONCHOSCOPY, FIBEROPTIC, endobronchial ultrasound,  pleural biopsy;  Surgeon: Dallin Agrawal MD;  Location: [facility] OR     INJECT BLOCK MEDIAL BRANCH CERVICAL/THORACIC/LUMBAR       INSERT CHEST TUBE Right 2022    Procedure: INSERTION, CATHETER, INTERCOSTAL, FOR DRAINAGE;  Surgeon: Dallin Agrawal MD;  Location: [facility] GI     INSERT CHEST TUBE Right 3/9/2022    Procedure: INSERTION, CATHETER, INTERCOSTAL, FOR DRAINAGE;  Surgeon: Sushila Antonio MD;  Location: [facility] GI     IR CHEST TUBE REMOVAL TUNNELED RIGHT  2022     OPTICAL TRACKING SYSTEM CRANIOTOMY, EXCISE TUMOR, COMBINED Left 2022    Procedure: Left stealth craniotomy for tumor resection with motor mapping;  Surgeon: Stephen Moran MD;  Location:  OR     ORTHOPEDIC SURGERY      Ganesh. Rotator cuff repair.     PLEUROSCOPY N/A 2022    Procedure: Pleuroscopy with Pleural Biopsy;  Surgeon: Dallin Agrawal MD;  Location:  OR       Social History:  Lives with wife and 4 kids in [place]. Works as a  for an apartment complex in [place]. Exposure to household chemicals and . No significant exposure to asbestos. No signal exposure to benzene or similar chemicals. No significant smoking history-states that he smoked 1 to 2 cigarettes occasionally per month for about 2 years in college, non-smoking since then. No significant alcohol use history. No other recreational substances. Good support system. Kids are 23, 19, 17 and 13.    Family History  Significant history for cancers on maternal side. Mother  of uterine cancer. 2 maternal uncles have possible metastatic melanoma.    Outpatient Medications:  Current Outpatient Medications   Medication     alcohol swab prep pads     blood glucose (NO BRAND SPECIFIED) test strip     blood glucose calibration (NO BRAND SPECIFIED) solution     blood glucose monitoring (NO BRAND SPECIFIED) meter device kit     citalopram (CELEXA) 20 MG tablet     Continuous Blood Gluc Sensor (FREESTYLE IRENE 2 SENSOR) MISC     hydrochlorothiazide  (HYDRODIURIL) 25 MG tablet     insulin aspart (NOVOLOG PEN) 100 UNIT/ML pen     insulin glargine (LANTUS PEN) 100 UNIT/ML pen     insulin pen needle (31G X 8 MM) 31G X 8 MM miscellaneous     levETIRAcetam (KEPPRA) 1000 MG tablet     lisinopril (ZESTRIL) 40 MG tablet     metFORMIN (GLUCOPHAGE XR) 500 MG 24 hr tablet     methocarbamol (ROBAXIN) 500 MG tablet     oxyCODONE (ROXICODONE) 5 MG tablet     prochlorperazine (COMPAZINE) 10 MG tablet     propranolol (INDERAL) 20 MG tablet     rivaroxaban ANTICOAGULANT (XARELTO) 20 MG TABS tablet     thin (NO BRAND SPECIFIED) lancets     No current facility-administered medications for this visit.     PHYSICAL EXAMINATION ATTAINABLE DURING VIDEO VISIT:  CONSTITUTIONAL - Pt looks like stated age, pleasant, not in acute distress. Not obese.  NEURO: Oriented to time, person, and places. No tremor.  ENT, MOUTH: Pupils are equal.  Sclerae are anicteric.  Moist oral mucosa. No oral thrush.   NECK:  No jugular venous distention.  No thyroid enlargement.   RESPIRATORY: talk nl, no sob during conversation, no cough.   MUSCULOSKELETAL/EXTREMITIES:  No edema.  No joint deformity. Normal range of motion  SKIN:  No petechiae.  No rash.  No signs of cellulitis.   PSYCHIATRIC: Normal mood and affect. Good memory. Proper insight and judgement.   THE REST OF A COMPREHENSIVE PHYSICIAL EXAM IS DEFERRED DUE TO COVID-19 PUBLIC HEALTH EMERGENCY RELATED VIDEO VISIT RESCTRICTION.      Labs & Studies: I personally reviewed the following studies:  Most Recent 3 CBC's:  Recent Labs   Lab Test 01/09/23  1529 12/29/22  1841 11/08/22  0618   WBC 9.5 7.8 8.4   HGB 14.1 13.4 13.6   MCV 92 96 96    249 170     Most Recent 3 BMP's:  Recent Labs   Lab Test 12/29/22  1841 11/09/22  1104 11/09/22  0731 11/09/22  0208 11/08/22  1708 11/08/22  1435 11/08/22  0813 11/08/22  0618 11/08/22  0145 11/07/22  1531     --   --   --   --   --   --  140  --  143   POTASSIUM 3.4 3.4  --   --   --  3.5  --  3.2*   --  3.6   CHLORIDE 104  --   --   --   --   --   --  103  --  104   CO2 27  --   --   --   --   --   --  23  --  28   BUN 20.4*  --   --   --   --   --   --  15.1  --  16.7   CR 0.70  --   --   --   --   --   --  0.57*  --  0.68   ANIONGAP 13  --   --   --   --   --   --  14  --  11   TORY 9.5  --   --   --   --   --   --  8.4*  --  9.6   *  --  158* 165*   < >  --    < > 147*   < > 192*    < > = values in this interval not displayed.    Most Recent 2 LFT's:  Recent Labs   Lab Test 12/29/22  1841 11/07/22  1531   AST 20 20   ALT 17 23   ALKPHOS 83 79   BILITOTAL 0.6 1.0    Most Recent TSH and T4:  Recent Labs   Lab Test 09/12/22  1642   TSH 2.56        ASSESSMENT AND PLAN:  Stage IV NSCLC, Rt lung adenocarcinoma with metastasis to pleura, mediastinum , rt pleural effusion and brain diagnosed 1/2022 (AJCC 8th edition)  PD-L1 TPS 2-3% by [facility]   NGS [facility] panel-EML4:ALK rearragement; chr2:[id number], chr2:[id number]  NGS Guardant- GNAS R201H, KRAS K5E- No ALK    He began Alectinib 600 mg BID 3/2/22 and unfortunately developed grade 3 myalgias which have improved with lowering the dose 450 mg BID. He was holding drug 4/2/22 to 4/11/22 due to MSSA infection from pleurex which is removed. Resumed at 450 mg BID. Due to ongoing myalgias (Although CK is normal), we dose reduced to 300 mg BID.   In Dec 2022, developed Gr 3 arthralgia, and we stopped drug since 12/20/22. Some improvement (25%) in arthralgias sicne stopping the drug. However, still conitnues to have it.  Overall he has had a near CR to Rx in the lung, has a residual rt LL lesion. Plan was to start brigatinib.  Given persistent symptoms, will hold off starting brigatinib.  Will start on trial of steroids for the unremitting arhtalgia.  Will f/up in 2 weeks and consider starting brigatinib after 2 weeks  Discussed that the incidence of arthralgias are similar but may be numerically slightly lower. Would avoid certinib due to lower CNS efficacy compared  to brigatinib or lorlatininib. Would avoid lorlatinib due to cognitive issues given he has had similar symptoms with radiation necrosis.  Plan to start at lower dose 90 mg daily and stay there for 2 weeks (instead of 1) then go up from there.  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        Plan  RTC with me in 2 weeks    # Grade 3 arthralgias most likley related to alectinib, now improved 25% after stopping alectinib, however was expecting resolution  CK and aldolase has been normal   -All joints affected, no morning stiffness, normal ESRa nd CRP  -has tried ASA, NSAIDS, tylenol with suboptimal relief  -Now on oxycodone 5 mg every 4 hrs, which is tolerable  -will add trial of PO steroids predisone 40 mg for 2 weeks        # PE: provoked by malignancy vs bevacizumab in 11/2022  -New right-sided pleuritic chest pain, tachycardiam, CT showing rt sided sub segmental PE wo   -- on rivaroxiabn 20 mg daily      # Hypertensive urgency- resolved- today BP is high, missed meds today  # G3 diastolic HTN - led to one dose hold of bevacizumab and now discontinued  Adm with /111. No evidence of end-organ damage. Most likley from Bevacizumab. Recent MRI brain revealed no PRES, stable necrotic/mets areas and no new enhancement.  - PTA HTCZ  - PTA lisinopril   -On propranolol 20mg BID  - Pt to follow up with PCP in regards to BP mgmt       # Brain mets:  # Radiation necrosis  Baseline Brain MRI with several brain mets, s/p GK to 11-12 brain mets. F/u Brain MRI in June was showing enlargement of the one of the lesions along with edema, therefore had to undergo craniotomy followed by resection, the final biopsy consistent with radiation necrosis.  Had issues with radiation necrosis and swelling requiring steroids but these were driving up blood sugars drastically  -Began bevacizumab for radiation necrosis --15 mg/kg every 3 weeks,  plan for at least 3 months of therapy.  Dexamethasone taper through 10/31. S/p 2 doses of Bevacixumab now, last one 10/26 , but had to hold it given he had HTN urgency and PE.  -Recent Brain MRI showing Rx effect and resolution of radiation necrosis, will have to watch closely given he is off of bevaciuzumab  Next Brain MRI 4/2023    # Type 2 Diabetes: Self monitoring of blood glucose is not at goal of fasting  mg/dL. Now off of dexamethasone. Started metformin 500mg ER every day    --Started using Spotbros 2 continous glucose monitor  --FOllows medication management-   --on  Metformin and insulin  -- Novolog scale with correction--be sure to check blood sugars before eating and taking insulin with a meal       #grade 3 myalgias: CK has been normal. initially resolved after lowering dose though Connor recalls it has never really improved even with 300 mg BID dose  -off of rosuvastatin now    #hypokalemia: mild and he denies PO intake limitations thus will recheck with next weeks labs that are already scheduled    #normocytic anemia: sudden drop to <7, requiring 1 unit blood transfusion. Lab work up unrevealing. Has recovered to baseline  -consider EGD if hgb drops again, risk for GI bleed with chronic steroid use      #grade 2 fatigue: ongoing. Monitor any improvement with dose reduction     #MSSA infection, Sepsis at pleurex site- resolved  # Pleural effusion: s/p pleurex palcement  2/16/22. Then on 4/2 right PleurX catheter s/p removal for severe sepsis due to MSSA infection.    #dry eyes  #blurred vision  Continue artificial tears. Saw ophthalmology previosuly, changed prescription, mow improved.  No neuro sx.     #Psychiatry  # Situational Anxiety   - irritable at times, may be better off steroids. Declined referral to therapist and denies medication intervention for now. PCP can help manage this too if he has established relationship with him    #COVID vaccine: No COVID vaccine on record, did not discuss  today.       On the day of service  Chart review: 5 minutes  Visit duration: 30 minutes  Care coordination: 5 minutes    Bassam Persaud MD    Hematology, Oncology and Transplantation    Connor is a 42 year old who is being evaluated via a billable video visit.      How would you like to obtain your AVS? MyChart  If the video visit is dropped, the invitation should be resent by: Text to cell phone: [phone]  Will anyone else be joining your video visit? Whit Bennett         Video-Visit Details    Type of service:  Video Visit   Video Start Time: 1:21 PM  Video End Time:1:21 PM    Originating Location (pt. Location): Home    Distant Location (provider location):  On-site  Platform used for Video Visit: SabinaWell

## 2023-01-25 ENCOUNTER — VIRTUAL VISIT (OUTPATIENT)
Dept: ONCOLOGY | Facility: CLINIC | Age: 43
End: 2023-01-25
Attending: STUDENT IN AN ORGANIZED HEALTH CARE EDUCATION/TRAINING PROGRAM
Payer: COMMERCIAL

## 2023-01-25 DIAGNOSIS — C34.31 MALIGNANT NEOPLASM OF LOWER LOBE OF RIGHT LUNG (H): Primary | ICD-10-CM

## 2023-01-25 DIAGNOSIS — J18.9 PNEUMONIA OF LEFT UPPER LOBE DUE TO INFECTIOUS ORGANISM: ICD-10-CM

## 2023-01-25 PROCEDURE — 99215 OFFICE O/P EST HI 40 MIN: CPT | Mod: 95 | Performed by: STUDENT IN AN ORGANIZED HEALTH CARE EDUCATION/TRAINING PROGRAM

## 2023-01-25 RX ORDER — PREDNISONE 20 MG/1
40 TABLET ORAL DAILY
Qty: 28 TABLET | Refills: 0 | Status: SHIPPED | OUTPATIENT
Start: 2023-01-25 | End: 2023-02-08

## 2023-01-25 NOTE — LETTER
1/25/2023         RE: Connor Emerson  [address]  [facility] [facility] [address]        Dear Colleague,    Thank you for referring your patient, Connor Emerson, to the [facility]. Please see a copy of my visit note below.    MEDICAL ONCOLOGY FOLLOW UP NOTE    PATIENT NAME: Connor Emerson  ENCOUNTER DATE: 1/25/2023    Care Team  Primary Oncologist: Bassam Persaud MD    REASON FOR CURRENT VISIT: F/u of lung cancer    HISTORY OF PRESENT ILLNESS:  Mr. Connor Emerson is a 42 year old  male who is a non-smoker with PMHx of T2DM, HTN with metastatic NSCLC comes for follow up     Oncologic Hx:    Diagnosis:   Stage IV NSCLC, Rt lung adenocarcinoma with metastasis to pleura, mediastinum , rt pleural effusion and brain diagnosed 1/2022 (AJCC 8th edition)  PD-L1 TPS 2-3% by Alligator   NGS [facility] panel-EML4:ALK rearragement  NGS Guardant- GNAS R201H, KRAS K5E- No ALK    Treatment:   Current:  3/2/22- now- Alectinib 300 mg BID (Dose reduced to 450 mg BID from 600 mg BID due to grade 3 myalgias 3/21/22, again reduced to 300 mg BID 9/28/22)    )  Past:  2/15/22- GK to 11 briain lesions  6/28/22- Craniotomy, resection  9/28/22-10/26- Bevacizumab for radiation necrosis (stopped due to PE)    Intent of treatment: Palliative    Oncologic course:  1/19/22 to 1/22/22-Admitted to [facility] for 2 week progressive SOB secondary to have large rt sided pleural effusion, needing thoracentesis x2 (1.7L and 2.0 L removed), cytology positive for malignancy, adenocarcinoma.   1/26/22- Rt pleural mass biopsy-Dr. Agrawal--POSITIVE FOR ADENOCARCINOMA CONSISTENT WITH LUNG PRIMARY, admixed with mesothelial hyperplasia and inflammatory infiltrate (+ TTF-1 and CK 7;  negative  p40, calretinin and WT-1. PAX8 immunostain focal +). 4th thoracentesis done simultaneously - 3L approx removed.   2/1/22- PET/CT-Right lower lobe central infiltrative FDG avid 8.2 x 9.6 cm mass representing a primary lung adenocarcinoma. Ipsilateral right  perihilar, bilateral pretracheal, subcarinal and superior mediastinal michele metastases. Contralateral mildly FDG avid few lung nodules are suspicious for contralateral metastasis. At least 3 intracranial metastases in the right frontal lobe, left frontal lobe and left cerebellar hemisphere. Nonspecific mild diffuse bone marrow uptake. Further evaluation with a spine MRI could be considered to rule out early marrow infiltration. This could also be seen with red marrow conversion.  2/5/22-  Brain MRI- At least 9 intracranial metastases as detailed above. The dominant lesions involving the orbital right frontal lobe, the posterior left middle frontal gyrus, anterior right temporal lobe and in the left cerebellar hemisphere have surrounding moderate vasogenic type edema.  2/15/22- Saw Dr. Arango from [facility] Onc- Rcd GK to 12 lesion in bran  2/16/22- Pleurex placement   3/2/22- Started Alectinib 600 mg BID  3/21/22- Dose reduced to 450 mg BID due to grade 3 myalgias and fatigue  4/2/22 to 4/5/22- Admitted at [facility] for- Severe sepsis due to MSSA infection of right PleurX catheter s/p removal- He presented with onset of pain at tube site starting 4/1; at arrival was tachycardic with leukocytosis (22.7) and elevated lactic acid (2.9).  CT chest showed fluid and stranding tracking outside the pleural space into chest wall along pleural catheter.  IR was consulted and removed catheter 4/2 with report of pustular drainage and tip culture growing MSSA.  Thoracic Surg was consulted who felt no surgical indication necessary given minimal pleural fluid and lack of any signs of abscess.  Initially treated with broad spectrum coverage for sepsis, narrowed to Ancef once sensitivities returned with plan to transition to cefadroxil for an additional 10 days at discharge per ID. Held drug 4/2 to 4/11 5/2/22- CT CAP- Overall, positive response to therapy with decreased size of right lower lobe and right pleural-based masses,  pulmonary metastases, hilar and mediastinal lymphadenopathy. However, a single right posterior pleural-based mass has slightly increased in size since 2/24/2022. No metastatic disease in the abdomen and pelvis. Right Pleurx catheter has been removed. Trace right pleural effusion and right basilar atelectasis.  5/2/22- Brain MRI- The previously demonstrated brain metastases are mildly diminished in size versus to 2/5/2022. The degree of edema is also diminished but not completely resolved. Probable trace amounts of intralesional bleeding demonstrated on the gradient sequence within the metastases. No definite new metastasis or progressive mass effect. No hydrocephalus or infarct.    6/15/22 to 6/17/22- Admitted at [facility]-with aphasia and word finding difficulty over last few weeks.  He presented to [facility] ED on 6/10 for evaluation of his symptoms. MRI brain showed multiple intracranial metastases, with interval enlargement of the dominant lesion within the left frontal lobe and increased surrounding vasogenic edema with 2 mm rightward shift of the septum pellucidum. Due to his worsening anxiety, he left AMA. His symptoms continued to progress to where he could not write at work so he decided to go to the ED for re-evaluation and treatment. Evaluated by NSGY, Rad Onc (radiaiton necrosis vs tumor progression).  6/16/22- MR Brain (6/16) shows multiple intracranial metastases, with interval enlargement  of the dominant lesion within the left frontal lobe and increased surrounding vasogenic edema with 2 mm rightward shift of the septum pellucidum.  6/16/22- - CT CAP shows slightly decreased size of right lower lobe and right pleural-based masses. No new pulmonary nodules or lymphadenopathy; No evidence of metastatic disease in the abdomen or pelvis.   6/28/22 to 6/30/22- Admitted at [facility]- Elective left Stealth craniotomy with resection of brain tumor due to ongoing symptoms. No intraoperative complications. EBL 50 ml.  Path  showing radiation necrosis- no evidence of tumor.  7/5/22- Ct CAP- Right lower lobe low-density nodules are not significantly changed. A small left upper lobe pulmonary nodule is also unchanged. Trace pleural fluid on the right has increased slightly. No convincing evidence for metastatic disease in the abdomen or pelvis.  7/18/22 to 7/19/22- Admitted to [facility] for seizure- Reportedly was only taking once Keppra instead of twice daily. Also resumed on dexamethasone 2 mg daily  8/1/22- Brain MRI- Redemonstrated postsurgical changes status post left frontoparietal Craniotomy. Interval increase in size of the dominant ring-enhancing lesion in the left posterior superior frontal lobe with increased moderate surrounding vasogenic edema and local mass effect resulting in narrowing of the supratentorial ventricular system. No significant midline shift/herniation at this time  8/1/22- Dex was increased to 4 mg daily by Dr. Moran  9/1/22- CT Chest- Near resolution of previously seen right pleural nodule. Stable right lower lobe pulmonary nodule  9/28/22- Bevacizumab for radiation necrosis  10/26/22- Bevacizumab   10/26/22- Ct CAP- Stable posterior medial right lower lobe 1.9 x 1.1 cm nodule series 8 image 176. Adjacent stable scarring and atelectasis. The previously noted pleural nodule posteriorly on the right is not currently clearly identified. Stable left upper lobe 0.3 cm nodule image 56  10/26/22- Brain MRI- Overall improved appearance of multiple intracranial metastases with near resolution of associated edema and diminished enhancement and size of multiple residual lesions. No definite new or progressive metastasis.  11/7/22 to 11/9/22- Admitted for PE and HTN urgency- Small pulmonary embolism in the right lower lobe pulmonary artery. started on Lovenox, Brain MRI neg for PRES.  12/29/22- ED visit- bilateral hip pain, pain in shoulders, knuckles, knees, and ankles- holding alectinib since 12/20/22 1/6/23- Ct CAP- Mild  groundglass nodularity in the left upper lobe is new since the previous exam, and may be infectious in etiology. No other significant interval change. Pulmonary nodules are not significantly changed.  1/6/23- Brain MRI- Stable to diminished sequelae of intracranial metastasis and treatment changes. No new or progressive metastasis. No superimposed acute intracranial finding.        He works as a maintenance manger for apartment complexes    Interval Hx:  Connor is here to follow up. He got CXR last week for new symptoms of pain below rt arm pit which was unremarkable,  He continues to have the diffuse MSK painm is about 25% better since holding alctinib last month  He has bilateral hip pain, pain in shoulders, knuckles, knees, and ankles- holding alectinib since 12/20/22  Has moderate to severe Pain in the joints, all llarge and small jopints (nearly every single joints)  Joints in the hands, shoulders, toes, hips and knees  He has been doing heat packs and has tried asa, tylenol, ibupfren ( 6 times daily) with no benfit  Takes oxycodone 4 times daily which helps with the pain, able to manage the pain with some benefit  Everything else is ok  He continues to work daily  Recently noticed cough is worse than usual, bringing up clear sputum  No fever or chills  Breathing is stable, no cough, no fever or chills.  Able to eat and drink well  Independent of ADL and IADL      REVIEW OF SYSTEMS: 14 point ROS negative other than the symptoms noted above in the HPI.    Wt Readings from Last 4 Encounters:   01/09/23 97.8 kg (215 lb 11.2 oz)   12/29/22 97.5 kg (215 lb)   11/22/22 99.7 kg (219 lb 12.8 oz)   11/16/22 94.8 kg (209 lb)      Review of Systems:  A comprehensive ROS was performed and found to be negative or non-contributory with the exception of that noted in the HPI above.    Past Medical History:  GERD  Hypertension, not on medication  Type 2 diabetes mellitus, not on medications currently, previously on  Metformin    Past Surgical History:  Past Surgical History:   Procedure Laterality Date     BRONCHOSCOPY RIGID OR FLEXIBLE W/TRANSENDOSCOPIC ENDOBRONCHIAL ULTRASOUND GUIDED Bilateral 2022    Procedure: Right BRONCHOSCOPY, FIBEROPTIC, endobronchial ultrasound, pleural biopsy;  Surgeon: Dallin Agrawal MD;  Location:  OR     INJECT BLOCK MEDIAL BRANCH CERVICAL/THORACIC/LUMBAR       INSERT CHEST TUBE Right 2022    Procedure: INSERTION, CATHETER, INTERCOSTAL, FOR DRAINAGE;  Surgeon: Dallin Agrawal MD;  Location: [facility]     INSERT CHEST TUBE Right 3/9/2022    Procedure: INSERTION, CATHETER, INTERCOSTAL, FOR DRAINAGE;  Surgeon: Sushila Antonio MD;  Location: [facility]     IR CHEST TUBE REMOVAL TUNNELED RIGHT  2022     OPTICAL TRACKING SYSTEM CRANIOTOMY, EXCISE TUMOR, COMBINED Left 2022    Procedure: Left stealth craniotomy for tumor resection with motor mapping;  Surgeon: Stephen Moran MD;  Location:  OR     ORTHOPEDIC SURGERY      Ganesh. Rotator cuff repair.     PLEUROSCOPY N/A 2022    Procedure: Pleuroscopy with Pleural Biopsy;  Surgeon: Dallin Agrawal MD;  Location:  OR       Social History:  Lives with wife and 4 kids in [place]. Works as a  for an apartment complex in [place]. Exposure to household chemicals and . No significant exposure to asbestos. No signal exposure to benzene or similar chemicals. No significant smoking history-states that he smoked 1 to 2 cigarettes occasionally per month for about 2 years in college, non-smoking since then. No significant alcohol use history. No other recreational substances. Good support system. Kids are 23, 19, 17 and 13.    Family History  Significant history for cancers on maternal side. Mother  of uterine cancer. 2 maternal uncles have possible metastatic melanoma.    Outpatient Medications:  Current Outpatient Medications   Medication     alcohol swab prep pads     blood glucose (NO BRAND SPECIFIED)  test strip     blood glucose calibration (NO BRAND SPECIFIED) solution     blood glucose monitoring (NO BRAND SPECIFIED) meter device kit     citalopram (CELEXA) 20 MG tablet     Continuous Blood Gluc Sensor (FREESTYLE IRENE 2 SENSOR) MISC     hydrochlorothiazide (HYDRODIURIL) 25 MG tablet     insulin aspart (NOVOLOG PEN) 100 UNIT/ML pen     insulin glargine (LANTUS PEN) 100 UNIT/ML pen     insulin pen needle (31G X 8 MM) 31G X 8 MM miscellaneous     levETIRAcetam (KEPPRA) 1000 MG tablet     lisinopril (ZESTRIL) 40 MG tablet     metFORMIN (GLUCOPHAGE XR) 500 MG 24 hr tablet     methocarbamol (ROBAXIN) 500 MG tablet     oxyCODONE (ROXICODONE) 5 MG tablet     prochlorperazine (COMPAZINE) 10 MG tablet     propranolol (INDERAL) 20 MG tablet     rivaroxaban ANTICOAGULANT (XARELTO) 20 MG TABS tablet     thin (NO BRAND SPECIFIED) lancets     No current facility-administered medications for this visit.     PHYSICAL EXAMINATION ATTAINABLE DURING VIDEO VISIT:  CONSTITUTIONAL - Pt looks like stated age, pleasant, not in acute distress. Not obese.  NEURO: Oriented to time, person, and places. No tremor.  ENT, MOUTH: Pupils are equal.  Sclerae are anicteric.  Moist oral mucosa. No oral thrush.   NECK:  No jugular venous distention.  No thyroid enlargement.   RESPIRATORY: talk nl, no sob during conversation, no cough.   MUSCULOSKELETAL/EXTREMITIES:  No edema.  No joint deformity. Normal range of motion  SKIN:  No petechiae.  No rash.  No signs of cellulitis.   PSYCHIATRIC: Normal mood and affect. Good memory. Proper insight and judgement.   THE REST OF A COMPREHENSIVE PHYSICIAL EXAM IS DEFERRED DUE TO COVID-19 PUBLIC HEALTH EMERGENCY RELATED VIDEO VISIT RESCTRICTION.      Labs & Studies: I personally reviewed the following studies:  Most Recent 3 CBC's:  Recent Labs   Lab Test 01/09/23  1529 12/29/22  1841 11/08/22  0618   WBC 9.5 7.8 8.4   HGB 14.1 13.4 13.6   MCV 92 96 96    249 170     Most Recent 3 BMP's:  Recent Labs    Lab Test 12/29/22  1841 11/09/22  1104 11/09/22  0731 11/09/22  0208 11/08/22  1708 11/08/22  1435 11/08/22  0813 11/08/22  0618 11/08/22  0145 11/07/22  1531     --   --   --   --   --   --  140  --  143   POTASSIUM 3.4 3.4  --   --   --  3.5  --  3.2*  --  3.6   CHLORIDE 104  --   --   --   --   --   --  103  --  104   CO2 27  --   --   --   --   --   --  23  --  28   BUN 20.4*  --   --   --   --   --   --  15.1  --  16.7   CR 0.70  --   --   --   --   --   --  0.57*  --  0.68   ANIONGAP 13  --   --   --   --   --   --  14  --  11   TORY 9.5  --   --   --   --   --   --  8.4*  --  9.6   *  --  158* 165*   < >  --    < > 147*   < > 192*    < > = values in this interval not displayed.    Most Recent 2 LFT's:  Recent Labs   Lab Test 12/29/22  1841 11/07/22  1531   AST 20 20   ALT 17 23   ALKPHOS 83 79   BILITOTAL 0.6 1.0    Most Recent TSH and T4:  Recent Labs   Lab Test 09/12/22  1642   TSH 2.56        ASSESSMENT AND PLAN:  Stage IV NSCLC, Rt lung adenocarcinoma with metastasis to pleura, mediastinum , rt pleural effusion and brain diagnosed 1/2022 (AJCC 8th edition)  PD-L1 TPS 2-3% by McLeod   NGS [facility] panel-EML4:ALK rearragement; chr2:[id number], chr2:[id number]  NGS Guardant- GNAS R201H, KRAS K5E- No ALK    He began Alectinib 600 mg BID 3/2/22 and unfortunately developed grade 3 myalgias which have improved with lowering the dose 450 mg BID. He was holding drug 4/2/22 to 4/11/22 due to MSSA infection from pleurex which is removed. Resumed at 450 mg BID. Due to ongoing myalgias (Although CK is normal), we dose reduced to 300 mg BID.   In Dec 2022, developed Gr 3 arthralgia, and we stopped drug since 12/20/22. Some improvement (25%) in arthralgias sicne stopping the drug. However, still conitnues to have it.  Overall he has had a near CR to Rx in the lung, has a residual rt LL lesion. Plan was to start brigatinib.  Given persistent symptoms, will hold off starting brigatinib.  Will start on trial of  steroids for the unremitting arhtalgia.  Will f/up in 2 weeks and consider starting brigatinib after 2 weeks  Discussed that the incidence of arthralgias are similar but may be numerically slightly lower. Would avoid certinib due to lower CNS efficacy compared to brigatinib or lorlatininib. Would avoid lorlatinib due to cognitive issues given he has had similar symptoms with radiation necrosis.  Plan to start at lower dose 90 mg daily and stay there for 2 weeks (instead of 1) then go up from there.  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        Plan  RTC with me in 2 weeks    # Grade 3 arthralgias most likley related to alectinib, now improved 25% after stopping alectinib, however was expecting resolution  CK and aldolase has been normal   -All joints affected, no morning stiffness, normal ESRa nd CRP  -has tried ASA, NSAIDS, tylenol with suboptimal relief  -Now on oxycodone 5 mg every 4 hrs, which is tolerable  -will add trial of PO steroids predisone 40 mg for 2 weeks        # PE: provoked by malignancy vs bevacizumab in 11/2022  -New right-sided pleuritic chest pain, tachycardiam, CT showing rt sided sub segmental PE wo   -- on rivaroxiabn 20 mg daily      # Hypertensive urgency- resolved- today BP is high, missed meds today  # G3 diastolic HTN - led to one dose hold of bevacizumab and now discontinued  Adm with /111. No evidence of end-organ damage. Most likley from Bevacizumab. Recent MRI brain revealed no PRES, stable necrotic/mets areas and no new enhancement.  - PTA HTCZ  - PTA lisinopril   -On propranolol 20mg BID  - Pt to follow up with PCP in regards to BP mgmt       # Brain mets:  # Radiation necrosis  Baseline Brain MRI with several brain mets, s/p GK to 11-12 brain mets. F/u Brain MRI in June was showing enlargement of the one of the lesions along with edema, therefore had to undergo craniotomy  followed by resection, the final biopsy consistent with radiation necrosis.  Had issues with radiation necrosis and swelling requiring steroids but these were driving up blood sugars drastically  -Began bevacizumab for radiation necrosis --15 mg/kg every 3 weeks, plan for at least 3 months of therapy.  Dexamethasone taper through 10/31. S/p 2 doses of Bevacixumab now, last one 10/26 , but had to hold it given he had HTN urgency and PE.  -Recent Brain MRI showing Rx effect and resolution of radiation necrosis, will have to watch closely given he is off of bevaciuzumab  Next Brain MRI 4/2023    # Type 2 Diabetes: Self monitoring of blood glucose is not at goal of fasting  mg/dL. Now off of dexamethasone. Started metformin 500mg ER every day    --Started using LIKECHARITY 2 continous glucose monitor  --FOllows medication management-   --on  Metformin and insulin  -- Novolog scale with correction--be sure to check blood sugars before eating and taking insulin with a meal       #grade 3 myalgias: CK has been normal. initially resolved after lowering dose though Connor recalls it has never really improved even with 300 mg BID dose  -off of rosuvastatin now    #hypokalemia: mild and he denies PO intake limitations thus will recheck with next weeks labs that are already scheduled    #normocytic anemia: sudden drop to <7, requiring 1 unit blood transfusion. Lab work up unrevealing. Has recovered to baseline  -consider EGD if hgb drops again, risk for GI bleed with chronic steroid use      #grade 2 fatigue: ongoing. Monitor any improvement with dose reduction     #MSSA infection, Sepsis at pleurex site- resolved  # Pleural effusion: s/p pleurex palcement  2/16/22. Then on 4/2 right PleurX catheter s/p removal for severe sepsis due to MSSA infection.    #dry eyes  #blurred vision  Continue artificial tears. Saw ophthalmology previosuly, changed prescription, mow improved.  No neuro sx.     #Psychiatry  # Situational Anxiety   -  irritable at times, may be better off steroids. Declined referral to therapist and denies medication intervention for now. PCP can help manage this too if he has established relationship with him    #COVID vaccine: No COVID vaccine on record, did not discuss today.       On the day of service  Chart review: 5 minutes  Visit duration: 30 minutes  Care coordination: 5 minutes    Bassam Persaud MD    Hematology, Oncology and Transplantation    Connor is a 42 year old who is being evaluated via a billable video visit.      How would you like to obtain your AVS? MyChart  If the video visit is dropped, the invitation should be resent by: Text to cell phone: [phone]  Will anyone else be joining your video visit? Whit Bennett         Video-Visit Details    Type of service:  Video Visit   Video Start Time: 1:21 PM  Video End Time:1:21 PM    Originating Location (pt. Location): Home    Distant Location (provider location):  On-site  Platform used for Video Visit: Daniel

## 2023-01-28 ENCOUNTER — MYC REFILL (OUTPATIENT)
Dept: ONCOLOGY | Facility: CLINIC | Age: 43
End: 2023-01-28

## 2023-01-28 DIAGNOSIS — G89.3 CANCER ASSOCIATED PAIN: ICD-10-CM

## 2023-02-01 RX ORDER — OXYCODONE HYDROCHLORIDE 5 MG/1
5 TABLET ORAL EVERY 4 HOURS PRN
Qty: 60 TABLET | Refills: 0 | Status: SHIPPED | OUTPATIENT
Start: 2023-02-01 | End: 2023-03-02

## 2023-02-01 NOTE — TELEPHONE ENCOUNTER
Narcotic Refill Request    Medication(s) requested:  Oxycodone   Person Requesting Refill:Connor (pt)  What pain is the medication treating: bones  How is the medication being taken?:  Takes about 2-3 tablets in a 24hour period  Does pt have enough for today? 2 left  Is pain being adequately controlled on the current regimen?: okay  Experiencing any side effects from medication?: denies    Date of most recent appointment:  1/25/23 Dr. Persaud  Any No Show Visits:none recently  Next appointment:   2/8/23 Dr. Persaud  Last fill date and by whom:  Dr. Persaud on 1/9/23   Reviewed: not an agent    Routed to Dr. Persaud

## 2023-02-02 NOTE — TELEPHONE ENCOUNTER
Chino Chery : Withdrew from program.    LFD:  10/14/2022    Juliette Marquez CPhT  [facility]  Oncology Pharmacy Liaison II  [email or web address]  [phone] (phone  [phone] (fax

## 2023-02-07 NOTE — PROGRESS NOTES
Video-Visit Details    Type of service:  Video Visit    Video Start Time (time video started): 12 pm    Video End Time (time video stopped): 12: 30 pm    Originating Location (pt. Location): Home        Distant Location (provider location):  On-site    Mode of Communication:  Video Conference via [facility] ONCOLOGY FOLLOW UP NOTE    PATIENT NAME: Connor Emerson  ENCOUNTER DATE: 2/8/2023    Care Team  Primary Oncologist: Bassam Persaud MD    REASON FOR CURRENT VISIT: F/u of lung cancer    HISTORY OF PRESENT ILLNESS:  Mr. Connor Emerson is a 42 year old  male who is a non-smoker with PMHx of T2DM, HTN with metastatic NSCLC comes for follow up     Oncologic Hx:    Diagnosis:   Stage IV NSCLC, Rt lung adenocarcinoma with metastasis to pleura, mediastinum , rt pleural effusion and brain diagnosed 1/2022 (AJCC 8th edition)  PD-L1 TPS 2-3% by [place]   NGS [facility] panel-EML4:ALK rearragement  NGS Guardant- GNAS R201H, KRAS K5E- No ALK    Treatment:   Current:  3/2/22- now- Alectinib 300 mg BID (Dose reduced to 450 mg BID from 600 mg BID due to grade 3 myalgias 3/21/22, again reduced to 300 mg BID 9/28/22)    )  Past:  2/15/22- GK to 11 briain lesions  6/28/22- Craniotomy, resection  9/28/22-10/26- Bevacizumab for radiation necrosis (stopped due to PE)    Intent of treatment: Palliative    Oncologic course:  1/19/22 to 1/22/22-Admitted to [facility] for 2 week progressive SOB secondary to have large rt sided pleural effusion, needing thoracentesis x2 (1.7L and 2.0 L removed), cytology positive for malignancy, adenocarcinoma.   1/26/22- Rt pleural mass biopsy-Dr. Agrawal--POSITIVE FOR ADENOCARCINOMA CONSISTENT WITH LUNG PRIMARY, admixed with mesothelial hyperplasia and inflammatory infiltrate (+ TTF-1 and CK 7;  negative  p40, calretinin and WT-1. PAX8 immunostain focal +). 4th thoracentesis done simultaneously - 3L approx removed.   2/1/22- PET/CT-Right lower lobe central infiltrative FDG avid 8.2 x 9.6 cm mass  representing a primary lung adenocarcinoma. Ipsilateral right perihilar, bilateral pretracheal, subcarinal and superior mediastinal michele metastases. Contralateral mildly FDG avid few lung nodules are suspicious for contralateral metastasis. At least 3 intracranial metastases in the right frontal lobe, left frontal lobe and left cerebellar hemisphere. Nonspecific mild diffuse bone marrow uptake. Further evaluation with a spine MRI could be considered to rule out early marrow infiltration. This could also be seen with red marrow conversion.  2/5/22-  Brain MRI- At least 9 intracranial metastases as detailed above. The dominant lesions involving the orbital right frontal lobe, the posterior left middle frontal gyrus, anterior right temporal lobe and in the left cerebellar hemisphere have surrounding moderate vasogenic type edema.  2/15/22- Saw Dr. Arango from [facility] Onc- Rcd GK to 12 lesion in bran  2/16/22- Pleurex placement   3/2/22- Started Alectinib 600 mg BID  3/21/22- Dose reduced to 450 mg BID due to grade 3 myalgias and fatigue  4/2/22 to 4/5/22- Admitted at [facility] for- Severe sepsis due to MSSA infection of right PleurX catheter s/p removal- He presented with onset of pain at tube site starting 4/1; at arrival was tachycardic with leukocytosis (22.7) and elevated lactic acid (2.9).  CT chest showed fluid and stranding tracking outside the pleural space into chest wall along pleural catheter.  IR was consulted and removed catheter 4/2 with report of pustular drainage and tip culture growing MSSA.  Thoracic Surg was consulted who felt no surgical indication necessary given minimal pleural fluid and lack of any signs of abscess.  Initially treated with broad spectrum coverage for sepsis, narrowed to Ancef once sensitivities returned with plan to transition to cefadroxil for an additional 10 days at discharge per ID. Held drug 4/2 to 4/11 5/2/22- CT CAP- Overall, positive response to therapy with decreased  size of right lower lobe and right pleural-based masses, pulmonary metastases, hilar and mediastinal lymphadenopathy. However, a single right posterior pleural-based mass has slightly increased in size since 2/24/2022. No metastatic disease in the abdomen and pelvis. Right Pleurx catheter has been removed. Trace right pleural effusion and right basilar atelectasis.  5/2/22- Brain MRI- The previously demonstrated brain metastases are mildly diminished in size versus to 2/5/2022. The degree of edema is also diminished but not completely resolved. Probable trace amounts of intralesional bleeding demonstrated on the gradient sequence within the metastases. No definite new metastasis or progressive mass effect. No hydrocephalus or infarct.    6/15/22 to 6/17/22- Admitted at [facility]-with aphasia and word finding difficulty over last few weeks.  He presented to [facility] ED on 6/10 for evaluation of his symptoms. MRI brain showed multiple intracranial metastases, with interval enlargement of the dominant lesion within the left frontal lobe and increased surrounding vasogenic edema with 2 mm rightward shift of the septum pellucidum. Due to his worsening anxiety, he left AMA. His symptoms continued to progress to where he could not write at work so he decided to go to the ED for re-evaluation and treatment. Evaluated by NSGY, Rad Onc (radiaiton necrosis vs tumor progression).  6/16/22- MR Brain (6/16) shows multiple intracranial metastases, with interval enlargement  of the dominant lesion within the left frontal lobe and increased surrounding vasogenic edema with 2 mm rightward shift of the septum pellucidum.  6/16/22- - CT CAP shows slightly decreased size of right lower lobe and right pleural-based masses. No new pulmonary nodules or lymphadenopathy; No evidence of metastatic disease in the abdomen or pelvis.   6/28/22 to 6/30/22- Admitted at [facility]- Elective left Stealth craniotomy with resection of brain tumor due to ongoing  symptoms. No intraoperative complications. EBL 50 ml.  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  7/5/22- Ct CAP- Right lower lobe low-density nodules are not significantly changed. A small left upper lobe pulmonary nodule is also unchanged. Trace pleural fluid on the right has increased slightly. No convincing evidence for metastatic disease in the abdomen or pelvis.  7/18/22 to 7/19/22- Admitted to [facility] for seizure- Reportedly was only taking once Keppra instead of twice daily. Also resumed on dexamethasone 2 mg daily  8/1/22- Brain MRI- Redemonstrated postsurgical changes status post left frontoparietal Craniotomy. Interval increase in size of the dominant ring-enhancing lesion in the left posterior superior frontal lobe with increased moderate surrounding vasogenic edema and local mass effect resulting in narrowing of the supratentorial ventricular system. No significant midline shift/herniation at this time  8/1/22- Dex was increased to 4 mg daily by Dr. Moran  9/1/22- CT Chest- Near resolution of previously seen right pleural nodule. Stable right lower lobe pulmonary nodule  9/28/22- Bevacizumab for radiation necrosis  10/26/22- Bevacizumab   10/26/22- Ct CAP- Stable posterior medial right lower lobe 1.9 x 1.1 cm nodule series 8 image 176. Adjacent stable scarring and atelectasis. The previously noted pleural nodule posteriorly on the right is not currently clearly identified. Stable left upper lobe 0.3 cm nodule image 56  10/26/22- Brain MRI- Overall improved appearance of multiple intracranial metastases with near resolution of associated edema and diminished enhancement and size of multiple residual lesions. No definite new or progressive metastasis.  11/7/22 to 11/9/22- Admitted for PE and HTN urgency- Small pulmonary embolism in the right lower lobe pulmonary artery. started on Lovenox, Brain MRI neg for PRES.  12/29/22- ED visit- bilateral hip pain, pain in shoulders, knuckles, knees, and  ankles- holding alectinib since 12/20/22 1/6/23- Ct CAP- Mild groundglass nodularity in the left upper lobe is new since the previous exam, and may be infectious in etiology. No other significant interval change. Pulmonary nodules are not significantly changed.  1/6/23- Brain MRI- Stable to diminished sequelae of intracranial metastasis and treatment changes. No new or progressive metastasis. No superimposed acute intracranial finding.        He works as a maintenance manger for apartment complexes    Interval Hx:  Connor is here to follow up.   He started steroids 10 days ago, 3 days after starting steroids his pain started to improve significantly  He continues to have the diffuse MSK painm is about 25% better since holding alctinib last month  The pain is now resolved about 1 week ago, he does not have any pain now  He has not taken any pain meds in the last few days  Everything else is ok  He continues to work daily  Recently noticed cough is worse than usual, bringing up clear sputum  No fever or chills  Breathing is stable, no cough, no fever or chills.  Able to eat and drink well  Independent of ADL and IADL    ECOG PS 0    REVIEW OF SYSTEMS: 14 point ROS negative other than the symptoms noted above in the HPI.    Wt Readings from Last 4 Encounters:   01/09/23 97.8 kg (215 lb 11.2 oz)   12/29/22 97.5 kg (215 lb)   11/22/22 99.7 kg (219 lb 12.8 oz)   11/16/22 94.8 kg (209 lb)      Review of Systems:  A comprehensive ROS was performed and found to be negative or non-contributory with the exception of that noted in the HPI above.    Past Medical History:  GERD  Hypertension, not on medication  Type 2 diabetes mellitus, not on medications currently, previously on Metformin    Past Surgical History:  Past Surgical History:   Procedure Laterality Date     BRONCHOSCOPY RIGID OR FLEXIBLE W/TRANSENDOSCOPIC ENDOBRONCHIAL ULTRASOUND GUIDED Bilateral 1/26/2022    Procedure: Right BRONCHOSCOPY, FIBEROPTIC, endobronchial  ultrasound, pleural biopsy;  Surgeon: Dallin Agrawal MD;  Location: [facility] OR     INJECT BLOCK MEDIAL BRANCH CERVICAL/THORACIC/LUMBAR       INSERT CHEST TUBE Right 2022    Procedure: INSERTION, CATHETER, INTERCOSTAL, FOR DRAINAGE;  Surgeon: Dallin Agrawal MD;  Location: [facility] GI     INSERT CHEST TUBE Right 3/9/2022    Procedure: INSERTION, CATHETER, INTERCOSTAL, FOR DRAINAGE;  Surgeon: Sushila Antonio MD;  Location: [facility] GI     IR CHEST TUBE REMOVAL TUNNELED RIGHT  2022     OPTICAL TRACKING SYSTEM CRANIOTOMY, EXCISE TUMOR, COMBINED Left 2022    Procedure: Left stealth craniotomy for tumor resection with motor mapping;  Surgeon: Stephen Moran MD;  Location:  OR     ORTHOPEDIC SURGERY      Ganesh. Rotator cuff repair.     PLEUROSCOPY N/A 2022    Procedure: Pleuroscopy with Pleural Biopsy;  Surgeon: Dallin Agrawal MD;  Location:  OR       Social History:  Lives with wife and 4 kids in [place]. Works as a  for an [employer] complex in [place]. Exposure to household chemicals and . No significant exposure to asbestos. No signal exposure to benzene or similar chemicals. No significant smoking history-states that he smoked 1 to 2 cigarettes occasionally per month for about 2 years in college, non-smoking since then. No significant alcohol use history. No other recreational substances. Good support system. Kids are 23, 19, 17 and 13.    Family History  Significant history for cancers on maternal side. Mother  of uterine cancer. 2 maternal uncles have possible metastatic melanoma.    Outpatient Medications:  Current Outpatient Medications   Medication     alcohol swab prep pads     blood glucose (NO BRAND SPECIFIED) test strip     blood glucose calibration (NO BRAND SPECIFIED) solution     blood glucose monitoring (NO BRAND SPECIFIED) meter device kit     citalopram (CELEXA) 20 MG tablet     Continuous Blood Gluc Sensor (FREESTYLE IRENE 2 SENSOR) [facility] [place]      hydrochlorothiazide (HYDRODIURIL) 25 MG tablet     insulin aspart (NOVOLOG PEN) 100 UNIT/ML pen     insulin glargine (LANTUS PEN) 100 UNIT/ML pen     insulin pen needle (31G X 8 MM) 31G X 8 MM miscellaneous     levETIRAcetam (KEPPRA) 1000 MG tablet     lisinopril (ZESTRIL) 40 MG tablet     metFORMIN (GLUCOPHAGE XR) 500 MG 24 hr tablet     methocarbamol (ROBAXIN) 500 MG tablet     oxyCODONE (ROXICODONE) 5 MG tablet     predniSONE (DELTASONE) 20 MG tablet     prochlorperazine (COMPAZINE) 10 MG tablet     propranolol (INDERAL) 20 MG tablet     rivaroxaban ANTICOAGULANT (XARELTO) 20 MG TABS tablet     thin (NO BRAND SPECIFIED) lancets     No current facility-administered medications for this visit.     PHYSICAL EXAMINATION ATTAINABLE DURING VIDEO VISIT:  CONSTITUTIONAL - Pt looks like stated age, pleasant, not in acute distress. Not obese.  NEURO: Oriented to time, person, and places. No tremor.  ENT, MOUTH: Pupils are equal.  Sclerae are anicteric.  Moist oral mucosa. No oral thrush.   NECK:  No jugular venous distention.  No thyroid enlargement.   RESPIRATORY: talk nl, no sob during conversation, no cough.   MUSCULOSKELETAL/EXTREMITIES:  No edema.  No joint deformity. Normal range of motion  SKIN:  No petechiae.  No rash.  No signs of cellulitis.   PSYCHIATRIC: Normal mood and affect. Good memory. Proper insight and judgement.   THE REST OF A COMPREHENSIVE PHYSICIAL EXAM IS DEFERRED DUE TO COVID-19 PUBLIC HEALTH EMERGENCY RELATED VIDEO VISIT RESCTRICTION.      Labs & Studies: I personally reviewed the following studies:  Most Recent 3 CBC's:  Recent Labs   Lab Test 01/09/23  1529 12/29/22  1841 11/08/22  0618   WBC 9.5 7.8 8.4   HGB 14.1 13.4 13.6   MCV 92 96 96    249 170     Most Recent 3 BMP's:  Recent Labs   Lab Test 12/29/22  1841 11/09/22  1104 11/09/22  0731 11/09/22  0208 11/08/22  1708 11/08/22  1435 11/08/22  0813 11/08/22  0618 11/08/22  0145 11/07/22  1531     --   --   --   --   --   --  140   --  143   POTASSIUM 3.4 3.4  --   --   --  3.5  --  3.2*  --  3.6   CHLORIDE 104  --   --   --   --   --   --  103  --  104   CO2 27  --   --   --   --   --   --  23  --  28   BUN 20.4*  --   --   --   --   --   --  15.1  --  16.7   CR 0.70  --   --   --   --   --   --  0.57*  --  0.68   ANIONGAP 13  --   --   --   --   --   --  14  --  11   TORY 9.5  --   --   --   --   --   --  8.4*  --  9.6   *  --  158* 165*   < >  --    < > 147*   < > 192*    < > = values in this interval not displayed.    Most Recent 2 LFT's:  Recent Labs   Lab Test 12/29/22  1841 11/07/22  1531   AST 20 20   ALT 17 23   ALKPHOS 83 79   BILITOTAL 0.6 1.0    Most Recent TSH and T4:  Recent Labs   Lab Test 09/12/22  1642   TSH 2.56        ASSESSMENT AND PLAN:  Stage IV NSCLC, Rt lung adenocarcinoma with metastasis to pleura, mediastinum , rt pleural effusion and brain diagnosed 1/2022 (AJCC 8th edition)  PD-L1 TPS 2-3% by [place]   NGS [facility] panel-EML4:ALK rearragement; chr2:[id number], chr2:[id number]  NGS Guardant- GNAS R201H, KRAS K5E- No ALK    He began Alectinib 600 mg BID 3/2/22 and unfortunately developed grade 3 myalgias which have improved with lowering the dose 450 mg BID. He was holding drug 4/2/22 to 4/11/22 due to MSSA infection from pleurex which is removed. Resumed at 450 mg BID. Due to ongoing myalgias (Although CK is normal), we dose reduced to 300 mg BID.   In Dec 2022, developed Gr 3 arthralgia, and we stopped drug since 12/20/22. Had unremitting arthalgias, eventully had to starte PO steroids which led to improvement and resolved.   Overall he has had a near CR to Rx in the lung, has a residual rt LL lesion. Plan was to start brigatinib.  Discussed that the incidence of arthralgias are similar but may be numerically slightly lower. Would avoid certinib due to lower CNS efficacy compared to brigatinib or lorlatininib. Would avoid lorlatinib due to cognitive issues given he has had similar symptoms with radiation  necrosis.  Plan to start at lower dose 90 mg daily and stay there for 2 weeks -> 120 mg for 1 week->150 mg daily for 1 week-> then 180 mg daily  Overall prognosis for ALK rearrangement lung cancer is median overall survival > 7 years based on the most recent update of the WINSOME study.      Plan  RTC with NP/PA for tox check in 3 weeks   CT in 2 months   RTC with me after CT    # Grade 3 arthralgias most likley related to alectinib, unremiittign with tylenol, NSAIDs or xocyodoen,  CK and aldolase has been normal   -All joints affected, no morning stiffness, normal ESRa nd CRP  Eventually started trial of prednisone 40 mg for 2 weeks, his symptoms started improving 2-3 days into steroids and resolved 1 week ago, he will finish 4 more days of prednsione.    # PE: provoked by malignancy vs bevacizumab in 11/2022  -New right-sided pleuritic chest pain, tachycardiam, CT showing rt sided sub segmental PE wo   -- on rivaroxiabn 20 mg daily      # Hypertensive urgency- resolved- today BP is high, missed meds today  # G3 diastolic HTN - led to one dose hold of bevacizumab and now discontinued  Adm with /111. No evidence of end-organ damage. Most likley from Bevacizumab. Recent MRI brain revealed no PRES, stable necrotic/mets areas and no new enhancement.  - PTA HTCZ  - PTA lisinopril   -On propranolol 20mg BID  - Pt to follow up with PCP in regards to BP mgmt       # Brain mets:  # Radiation necrosis  Baseline Brain MRI with several brain mets, s/p GK to 11-12 brain mets. F/u Brain MRI in June was showing enlargement of the one of the lesions along with edema, therefore had to undergo craniotomy followed by resection, the final biopsy consistent with radiation necrosis.  Had issues with radiation necrosis and swelling requiring steroids but these were driving up blood sugars drastically  -Began bevacizumab for radiation necrosis --15 mg/kg every 3 weeks, plan for at least 3 months of therapy.  Dexamethasone taper through  10/31. S/p 2 doses of Bevacixumab now, last one 10/26 , but had to hold it given he had HTN urgency and PE.  -Recent Brain MRI showing Rx effect and resolution of radiation necrosis, will have to watch closely given he is off of bevaciuzumab  Next Brain MRI 4/2023    # Type 2 Diabetes: Self monitoring of blood glucose is not at goal of fasting  mg/dL. Now off of dexamethasone. Started metformin 500mg ER every day    --Started using CS-Keys 2 continous glucose monitor  --FOllows medication management-   --on  Metformin and insulin  -- Novolog scale with correction--be sure to check blood sugars before eating and taking insulin with a meal       #grade 3 myalgias: CK has been normal. initially resolved after lowering dose though Connor recalls it has never really improved even with 300 mg BID dose  -off of rosuvastatin now    #hypokalemia: mild and he denies PO intake limitations thus will recheck with next weeks labs that are already scheduled    #normocytic anemia: sudden drop to <7, requiring 1 unit blood transfusion. Lab work up unrevealing. Has recovered to baseline  -consider EGD if hgb drops again, risk for GI bleed with chronic steroid use      #grade 2 fatigue: ongoing. Monitor any improvement with dose reduction     #MSSA infection, Sepsis at pleurex site- resolved  # Pleural effusion: s/p pleurex palcement  2/16/22. Then on 4/2 right PleurX catheter s/p removal for severe sepsis due to MSSA infection.    #dry eyes  #blurred vision  Continue artificial tears. Saw ophthalmology previosuly, changed prescription, mow improved.  No neuro sx.     #Psychiatry  # Situational Anxiety   - irritable at times, may be better off steroids. Declined referral to therapist and denies medication intervention for now. PCP can help manage this too if he has established relationship with him    #COVID vaccine: No COVID vaccine on record, did not discuss today.       On the day of service  Chart review: 5 minutes  Visit  duration: 30 minutes  Care coordination: 5 minutes    Bassam Persaud MD    Hematology, Oncology and Transplantation

## 2023-02-08 ENCOUNTER — TELEPHONE (OUTPATIENT)
Dept: ONCOLOGY | Facility: CLINIC | Age: 43
End: 2023-02-08

## 2023-02-08 ENCOUNTER — VIRTUAL VISIT (OUTPATIENT)
Dept: ONCOLOGY | Facility: CLINIC | Age: 43
End: 2023-02-08
Attending: STUDENT IN AN ORGANIZED HEALTH CARE EDUCATION/TRAINING PROGRAM
Payer: COMMERCIAL

## 2023-02-08 DIAGNOSIS — C34.31 MALIGNANT NEOPLASM OF LOWER LOBE OF RIGHT LUNG (H): ICD-10-CM
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
## 2023-02-08 DIAGNOSIS — C34.31 MALIGNANT NEOPLASM OF LOWER LOBE OF RIGHT LUNG (H): Primary | ICD-10-CM

## 2023-02-08 DIAGNOSIS — I67.89 RADIATION THERAPY INDUCED BRAIN NECROSIS: Primary | ICD-10-CM

## 2023-02-08 DIAGNOSIS — I67.89 RADIATION THERAPY INDUCED BRAIN NECROSIS: ICD-10-CM

## 2023-02-08 PROCEDURE — 99215 OFFICE O/P EST HI 40 MIN: CPT | Mod: VID | Performed by: STUDENT IN AN ORGANIZED HEALTH CARE EDUCATION/TRAINING PROGRAM

## 2023-02-08 PROCEDURE — G0463 HOSPITAL OUTPT CLINIC VISIT: HCPCS | Mod: PN,GT | Performed by: STUDENT IN AN ORGANIZED HEALTH CARE EDUCATION/TRAINING PROGRAM

## 2023-02-08 RX ORDER — PROCHLORPERAZINE MALEATE 10 MG
10 TABLET ORAL EVERY 6 HOURS PRN
Qty: 30 TABLET | Refills: 2 | Status: SHIPPED | OUTPATIENT
Start: 2023-02-08 | End: 2023-05-17

## 2023-02-08 NOTE — LETTER
2/8/2023         RE: Connor Emerson  [address]  [facility] [address]        Dear Colleague,    Thank you for referring your patient, Connor Emerson, to the [facility]. Please see a copy of my visit note below.    Video-Visit Details    Type of service:  Video Visit    Video Start Time (time video started): 12 pm    Video End Time (time video stopped): 12: 30 pm    Originating Location (pt. Location): Home        Distant Location (provider location):  On-site    Mode of Communication:  Video Conference via [facility] ONCOLOGY FOLLOW UP NOTE    PATIENT NAME: Connor Emerson  ENCOUNTER DATE: 2/8/2023    Care Team  Primary Oncologist: Bassam Persaud MD    REASON FOR CURRENT VISIT: F/u of lung cancer    HISTORY OF PRESENT ILLNESS:  Mr. Connor Emerson is a 42 year old  male who is a non-smoker with PMHx of T2DM, HTN with metastatic NSCLC comes for follow up     Oncologic Hx:    Diagnosis:   Stage IV NSCLC, Rt lung adenocarcinoma with metastasis to pleura, mediastinum , rt pleural effusion and brain diagnosed 1/2022 (AJCC 8th edition)  PD-L1 TPS 2-3% by Lena   NGS [facility] panel-EML4:ALK rearragement  NGS Guardant- GNAS R201H, KRAS K5E- No ALK    Treatment:   Current:  3/2/22- now- Alectinib 300 mg BID (Dose reduced to 450 mg BID from 600 mg BID due to grade 3 myalgias 3/21/22, again reduced to 300 mg BID 9/28/22)    )  Past:  2/15/22- GK to 11 briain lesions  6/28/22- Craniotomy, resection  9/28/22-10/26- Bevacizumab for radiation necrosis (stopped due to PE)    Intent of treatment: Palliative    Oncologic course:  1/19/22 to 1/22/22-Admitted to [facility] for 2 week progressive SOB secondary to have large rt sided pleural effusion, needing thoracentesis x2 (1.7L and 2.0 L removed), cytology positive for malignancy, adenocarcinoma.   1/26/22- Rt pleural mass biopsy-Dr. Agrawal--POSITIVE FOR ADENOCARCINOMA CONSISTENT WITH LUNG PRIMARY, admixed with mesothelial  hyperplasia and inflammatory infiltrate (+ TTF-1 and CK 7;  negative  p40, calretinin and WT-1. PAX8 immunostain focal +). 4th thoracentesis done simultaneously - 3L approx removed.   2/1/22- PET/CT-Right lower lobe central infiltrative FDG avid 8.2 x 9.6 cm mass representing a primary lung adenocarcinoma. Ipsilateral right perihilar, bilateral pretracheal, subcarinal and superior mediastinal michele metastases. Contralateral mildly FDG avid few lung nodules are suspicious for contralateral metastasis. At least 3 intracranial metastases in the right frontal lobe, left frontal lobe and left cerebellar hemisphere. Nonspecific mild diffuse bone marrow uptake. Further evaluation with a spine MRI could be considered to rule out early marrow infiltration. This could also be seen with red marrow conversion.  2/5/22-  Brain MRI- At least 9 intracranial metastases as detailed above. The dominant lesions involving the orbital right frontal lobe, the posterior left middle frontal gyrus, anterior right temporal lobe and in the left cerebellar hemisphere have surrounding moderate vasogenic type edema.  2/15/22- Saw Dr. Arango from Rad Onc- Rcd GK to 12 lesion in bran  2/16/22- Pleurex placement   3/2/22- Started Alectinib 600 mg BID  3/21/22- Dose reduced to 450 mg BID due to grade 3 myalgias and fatigue  4/2/22 to 4/5/22- Admitted at [facility] for- Severe sepsis due to MSSA infection of right PleurX catheter s/p removal- He presented with onset of pain at tube site starting 4/1; at arrival was tachycardic with leukocytosis (22.7) and elevated lactic acid (2.9).  CT chest showed fluid and stranding tracking outside the pleural space into chest wall along pleural catheter.  IR was consulted and removed catheter 4/2 with report of pustular drainage and tip culture growing MSSA.  Thoracic Surg was consulted who felt no surgical indication necessary given minimal pleural fluid and lack of any signs of abscess.  Initially treated  with broad spectrum coverage for sepsis, narrowed to Ancef once sensitivities returned with plan to transition to cefadroxil for an additional 10 days at discharge per ID. Held drug 4/2 to 4/11 5/2/22- CT CAP- Overall, positive response to therapy with decreased size of right lower lobe and right pleural-based masses, pulmonary metastases, hilar and mediastinal lymphadenopathy. However, a single right posterior pleural-based mass has slightly increased in size since 2/24/2022. No metastatic disease in the abdomen and pelvis. Right Pleurx catheter has been removed. Trace right pleural effusion and right basilar atelectasis.  5/2/22- Brain MRI- The previously demonstrated brain metastases are mildly diminished in size versus to 2/5/2022. The degree of edema is also diminished but not completely resolved. Probable trace amounts of intralesional bleeding demonstrated on the gradient sequence within the metastases. No definite new metastasis or progressive mass effect. No hydrocephalus or infarct.    6/15/22 to 6/17/22- Admitted at [facility]-with aphasia and word finding difficulty over last few weeks.  He presented to [facility] ED on 6/10 for evaluation of his symptoms. MRI brain showed multiple intracranial metastases, with interval enlargement of the dominant lesion within the left frontal lobe and increased surrounding vasogenic edema with 2 mm rightward shift of the septum pellucidum. Due to his worsening anxiety, he left AMA. His symptoms continued to progress to where he could not write at work so he decided to go to the ED for re-evaluation and treatment. Evaluated by NSGY, Rad Onc (radiaiton necrosis vs tumor progression).  6/16/22- MR Brain (6/16) shows multiple intracranial metastases, with interval enlargement  of the dominant lesion within the left frontal lobe and increased surrounding vasogenic edema with 2 mm rightward shift of the septum pellucidum.  6/16/22- - CT CAP shows slightly decreased size of right  lower lobe and right pleural-based masses. No new pulmonary nodules or lymphadenopathy; No evidence of metastatic disease in the abdomen or pelvis.   6/28/22 to 6/30/22- Admitted at [facility]- Elective left Stealth craniotomy with resection of brain tumor due to ongoing symptoms. No intraoperative complications. EBL 50 ml.  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  7/5/22- Ct CAP- Right lower lobe low-density nodules are not significantly changed. A small left upper lobe pulmonary nodule is also unchanged. Trace pleural fluid on the right has increased slightly. No convincing evidence for metastatic disease in the abdomen or pelvis.  7/18/22 to 7/19/22- Admitted to [facility] for seizure- Reportedly was only taking once Keppra instead of twice daily. Also resumed on dexamethasone 2 mg daily  8/1/22- Brain MRI- Redemonstrated postsurgical changes status post left frontoparietal Craniotomy. Interval increase in size of the dominant ring-enhancing lesion in the left posterior superior frontal lobe with increased moderate surrounding vasogenic edema and local mass effect resulting in narrowing of the supratentorial ventricular system. No significant midline shift/herniation at this time  8/1/22- Dex was increased to 4 mg daily by Dr. Moran  9/1/22- CT Chest- Near resolution of previously seen right pleural nodule. Stable right lower lobe pulmonary nodule  9/28/22- Bevacizumab for radiation necrosis  10/26/22- Bevacizumab   10/26/22- Ct CAP- Stable posterior medial right lower lobe 1.9 x 1.1 cm nodule series 8 image 176. Adjacent stable scarring and atelectasis. The previously noted pleural nodule posteriorly on the right is not currently clearly identified. Stable left upper lobe 0.3 cm nodule image 56  10/26/22- Brain MRI- Overall improved appearance of multiple intracranial metastases with near resolution of associated edema and diminished enhancement and size of multiple residual lesions. No definite new or  progressive metastasis.  11/7/22 to 11/9/22- Admitted for PE and HTN urgency- Small pulmonary embolism in the right lower lobe pulmonary artery. started on Lovenox, Brain MRI neg for PRES.  12/29/22- ED visit- bilateral hip pain, pain in shoulders, knuckles, knees, and ankles- holding alectinib since 12/20/22 1/6/23- Ct CAP- Mild groundglass nodularity in the left upper lobe is new since the previous exam, and may be infectious in etiology. No other significant interval change. Pulmonary nodules are not significantly changed.  1/6/23- Brain MRI- Stable to diminished sequelae of intracranial metastasis and treatment changes. No new or progressive metastasis. No superimposed acute intracranial finding.        He works as a maintenance manger for apartment complexes    Interval Hx:  Connor is here to follow up.   He started steroids 10 days ago, 3 days after starting steroids his pain started to improve significantly  He continues to have the diffuse MSK painm is about 25% better since holding alctinib last month  The pain is now resolved about 1 week ago, he does not have any pain now  He has not taken any pain meds in the last few days  Everything else is ok  He continues to work daily  Recently noticed cough is worse than usual, bringing up clear sputum  No fever or chills  Breathing is stable, no cough, no fever or chills.  Able to eat and drink well  Independent of ADL and IADL    ECOG PS 0    REVIEW OF SYSTEMS: 14 point ROS negative other than the symptoms noted above in the HPI.    Wt Readings from Last 4 Encounters:   01/09/23 97.8 kg (215 lb 11.2 oz)   12/29/22 97.5 kg (215 lb)   11/22/22 99.7 kg (219 lb 12.8 oz)   11/16/22 94.8 kg (209 lb)      Review of Systems:  A comprehensive ROS was performed and found to be negative or non-contributory with the exception of that noted in the HPI above.    Past Medical History:  GERD  Hypertension, not on medication  Type 2 diabetes mellitus, not on medications  currently, previously on Metformin    Past Surgical History:  Past Surgical History:   Procedure Laterality Date     BRONCHOSCOPY RIGID OR FLEXIBLE W/TRANSENDOSCOPIC ENDOBRONCHIAL ULTRASOUND GUIDED Bilateral 2022    Procedure: Right BRONCHOSCOPY, FIBEROPTIC, endobronchial ultrasound, pleural biopsy;  Surgeon: Dallin Agrawal MD;  Location:  OR     INJECT BLOCK MEDIAL BRANCH CERVICAL/THORACIC/LUMBAR       INSERT CHEST TUBE Right 2022    Procedure: INSERTION, CATHETER, INTERCOSTAL, FOR DRAINAGE;  Surgeon: Dallin Agrawal MD;  Location: [facility] GI     INSERT CHEST TUBE Right 3/9/2022    Procedure: INSERTION, CATHETER, INTERCOSTAL, FOR DRAINAGE;  Surgeon: Sushila Antonio MD;  Location:  GI     IR CHEST TUBE REMOVAL TUNNELED RIGHT  2022     OPTICAL TRACKING SYSTEM CRANIOTOMY, EXCISE TUMOR, COMBINED Left 2022    Procedure: Left stealth craniotomy for tumor resection with motor mapping;  Surgeon: Stephen Moran MD;  Location:  OR     ORTHOPEDIC SURGERY      Ganesh. Rotator cuff repair.     PLEUROSCOPY N/A 2022    Procedure: Pleuroscopy with Pleural Biopsy;  Surgeon: Dallin Agrawal MD;  Location:  OR       Social History:  Lives with wife and 4 kids in [place]. Works as a  for an apartment complex in [place]. Exposure to household chemicals and . No significant exposure to asbestos. No signal exposure to benzene or similar chemicals. No significant smoking history-states that he smoked 1 to 2 cigarettes occasionally per month for about 2 years in college, non-smoking since then. No significant alcohol use history. No other recreational substances. Good support system. Kids are 23, 19, 17 and 13.    Family History  Significant history for cancers on maternal side. Mother  of uterine cancer. 2 maternal uncles have possible metastatic melanoma.    Outpatient Medications:  Current Outpatient Medications   Medication     alcohol swab prep pads     blood  glucose (NO BRAND SPECIFIED) test strip     blood glucose calibration (NO BRAND SPECIFIED) solution     blood glucose monitoring (NO BRAND SPECIFIED) meter device kit     citalopram (CELEXA) 20 MG tablet     Continuous Blood Gluc Sensor (FREESTYLE IRENE 2 SENSOR) MISC     hydrochlorothiazide (HYDRODIURIL) 25 MG tablet     insulin aspart (NOVOLOG PEN) 100 UNIT/ML pen     insulin glargine (LANTUS PEN) 100 UNIT/ML pen     insulin pen needle (31G X 8 MM) 31G X 8 MM miscellaneous     levETIRAcetam (KEPPRA) 1000 MG tablet     lisinopril (ZESTRIL) 40 MG tablet     metFORMIN (GLUCOPHAGE XR) 500 MG 24 hr tablet     methocarbamol (ROBAXIN) 500 MG tablet     oxyCODONE (ROXICODONE) 5 MG tablet     predniSONE (DELTASONE) 20 MG tablet     prochlorperazine (COMPAZINE) 10 MG tablet     propranolol (INDERAL) 20 MG tablet     rivaroxaban ANTICOAGULANT (XARELTO) 20 MG TABS tablet     thin (NO BRAND SPECIFIED) lancets     No current facility-administered medications for this visit.     PHYSICAL EXAMINATION ATTAINABLE DURING VIDEO VISIT:  CONSTITUTIONAL - Pt looks like stated age, pleasant, not in acute distress. Not obese.  NEURO: Oriented to time, person, and places. No tremor.  ENT, MOUTH: Pupils are equal.  Sclerae are anicteric.  Moist oral mucosa. No oral thrush.   NECK:  No jugular venous distention.  No thyroid enlargement.   RESPIRATORY: talk nl, no sob during conversation, no cough.   MUSCULOSKELETAL/EXTREMITIES:  No edema.  No joint deformity. Normal range of motion  SKIN:  No petechiae.  No rash.  No signs of cellulitis.   PSYCHIATRIC: Normal mood and affect. Good memory. Proper insight and judgement.   THE REST OF A COMPREHENSIVE PHYSICIAL EXAM IS DEFERRED DUE TO COVID-19 PUBLIC HEALTH EMERGENCY RELATED VIDEO VISIT RESCTRICTION.      Labs & Studies: I personally reviewed the following studies:  Most Recent 3 CBC's:  Recent Labs   Lab Test 01/09/23  1529 12/29/22  1841 11/08/22  0618   WBC 9.5 7.8 8.4   HGB 14.1 13.4 13.6    MCV 92 96 96    249 170     Most Recent 3 BMP's:  Recent Labs   Lab Test 12/29/22  1841 11/09/22  1104 11/09/22  0731 11/09/22  0208 11/08/22  1708 11/08/22  1435 11/08/22  0813 11/08/22  0618 11/08/22  0145 11/07/22  1531     --   --   --   --   --   --  140  --  143   POTASSIUM 3.4 3.4  --   --   --  3.5  --  3.2*  --  3.6   CHLORIDE 104  --   --   --   --   --   --  103  --  104   CO2 27  --   --   --   --   --   --  23  --  28   BUN 20.4*  --   --   --   --   --   --  15.1  --  16.7   CR 0.70  --   --   --   --   --   --  0.57*  --  0.68   ANIONGAP 13  --   --   --   --   --   --  14  --  11   TORY 9.5  --   --   --   --   --   --  8.4*  --  9.6   *  --  158* 165*   < >  --    < > 147*   < > 192*    < > = values in this interval not displayed.    Most Recent 2 LFT's:  Recent Labs   Lab Test 12/29/22  1841 11/07/22  1531   AST 20 20   ALT 17 23   ALKPHOS 83 79   BILITOTAL 0.6 1.0    Most Recent TSH and T4:  Recent Labs   Lab Test 09/12/22  1642   TSH 2.56        ASSESSMENT AND PLAN:  Stage IV NSCLC, Rt lung adenocarcinoma with metastasis to pleura, mediastinum , rt pleural effusion and brain diagnosed 1/2022 (AJCC 8th edition)  PD-L1 TPS 2-3% by [place]   NGS [facility] panel-EML4:ALK rearragement; chr2:[id number], chr2:[id number]  NGS Guardant- GNAS R201H, KRAS K5E- No ALK    He began Alectinib 600 mg BID 3/2/22 and unfortunately developed grade 3 myalgias which have improved with lowering the dose 450 mg BID. He was holding drug 4/2/22 to 4/11/22 due to MSSA infection from pleurex which is removed. Resumed at 450 mg BID. Due to ongoing myalgias (Although CK is normal), we dose reduced to 300 mg BID.   In Dec 2022, developed Gr 3 arthralgia, and we stopped drug since 12/20/22. Had unremitting arthalgias, eventully had to starte PO steroids which led to improvement and resolved.   Overall he has had a near CR to Rx in the lung, has a residual rt LL lesion. Plan was to start brigatinib.  Discussed  that the incidence of arthralgias are similar but may be numerically slightly lower. Would avoid certinib due to lower CNS efficacy compared to brigatinib or lorlatininib. Would avoid lorlatinib due to cognitive issues given he has had similar symptoms with radiation necrosis.  Plan to start at lower dose 90 mg daily and stay there for 2 weeks -> 120 mg for 1 week->150 mg daily for 1 week-> then 180 mg daily  Overall prognosis for ALK rearrangement lung cancer is median overall survival > 7 years based on the most recent update of the WINSOME study.      Plan  RTC with NP/PA for tox check in 3 weeks   CT in 2 months   RTC with me after CT    # Grade 3 arthralgias most likley related to alectinib, unremiittign with tylenol, NSAIDs or xocyodoen,  CK and aldolase has been normal   -All joints affected, no morning stiffness, normal ESRa nd CRP  Eventually started trial of prednisone 40 mg for 2 weeks, his symptoms started improving 2-3 days into steroids and resolved 1 week ago, he will finish 4 more days of prednsione.    # PE: provoked by malignancy vs bevacizumab in 11/2022  -New right-sided pleuritic chest pain, tachycardiam, CT showing rt sided sub segmental PE wo   -- on rivaroxiabn 20 mg daily      # Hypertensive urgency- resolved- today BP is high, missed meds today  # G3 diastolic HTN - led to one dose hold of bevacizumab and now discontinued  Adm with /111. No evidence of end-organ damage. Most likley from Bevacizumab. Recent MRI brain revealed no PRES, stable necrotic/mets areas and no new enhancement.  - PTA HTCZ  - PTA lisinopril   -On propranolol 20mg BID  - Pt to follow up with PCP in regards to BP mgmt       # Brain mets:  # Radiation necrosis  Baseline Brain MRI with several brain mets, s/p GK to 11-12 brain mets. F/u Brain MRI in June was showing enlargement of the one of the lesions along with edema, therefore had to undergo craniotomy followed by resection, the final biopsy consistent with  radiation necrosis.  Had issues with radiation necrosis and swelling requiring steroids but these were driving up blood sugars drastically  -Began bevacizumab for radiation necrosis --15 mg/kg every 3 weeks, plan for at least 3 months of therapy.  Dexamethasone taper through 10/31. S/p 2 doses of Bevacixumab now, last one 10/26 , but had to hold it given he had HTN urgency and PE.  -Recent Brain MRI showing Rx effect and resolution of radiation necrosis, will have to watch closely given he is off of bevaciuzumab  Next Brain MRI 4/2023    # Type 2 Diabetes: Self monitoring of blood glucose is not at goal of fasting  mg/dL. Now off of dexamethasone. Started metformin 500mg ER every day    --Started using Analyze Re 2 continous glucose monitor  --FOllows medication management-   --on  Metformin and insulin  -- Novolog scale with correction--be sure to check blood sugars before eating and taking insulin with a meal       #grade 3 myalgias: CK has been normal. initially resolved after lowering dose though Connor recalls it has never really improved even with 300 mg BID dose  -off of rosuvastatin now    #hypokalemia: mild and he denies PO intake limitations thus will recheck with next weeks labs that are already scheduled    #normocytic anemia: sudden drop to <7, requiring 1 unit blood transfusion. Lab work up unrevealing. Has recovered to baseline  -consider EGD if hgb drops again, risk for GI bleed with chronic steroid use      #grade 2 fatigue: ongoing. Monitor any improvement with dose reduction     #MSSA infection, Sepsis at pleurex site- resolved  # Pleural effusion: s/p pleurex palcement  2/16/22. Then on 4/2 right PleurX catheter s/p removal for severe sepsis due to MSSA infection.    #dry eyes  #blurred vision  Continue artificial tears. Saw ophthalmology previosuly, changed prescription, mow improved.  No neuro sx.     #Psychiatry  # Situational Anxiety   - irritable at times, may be better off steroids.  Declined referral to therapist and denies medication intervention for now. PCP can help manage this too if he has established relationship with him    #COVID vaccine: No COVID vaccine on record, did not discuss today.       On the day of service  Chart review: 5 minutes  Visit duration: 30 minutes  Care coordination: 5 minutes    Bassam Persaud MD    Hematology, Oncology and Transplantation

## 2023-02-08 NOTE — NURSING NOTE
Is the patient currently in the state of [place]? YES    Visit mode:VIDEO    If the visit is dropped, the patient can be reconnected by: VIDEO VISIT: Text to cell phone: [phone]    Will anyone else be joining the visit? NO      How would you like to obtain your AVS? MyChart    Are changes needed to the allergy or medication list? NO    Comments or concerns related to today's visit: N/A  Loli Bravo, Virtual Visit Facilitator

## 2023-02-08 NOTE — ORAL ONC MGMT
Oral Chemotherapy Monitoring Program     Placed call to patient in follow up of planned start of Alunbrig therapy. Prescription for Alunbrig ramp up released to Connor's pharmacy today. He will start when he gets the medication delivery, hopefully in the next day or so. Planning for follow up labs on 3/9/2023 and check in with him about a week after he starts Alunbring. We reviewed the ramp up dosing schedule over first 4 weeks. He expressed understanding and agreement with this plan and thanked me for the call and care.     Jayden ChinchillaD  [facility]  [phone]  February 8, 2023

## 2023-02-09 ENCOUNTER — APPOINTMENT (OUTPATIENT)
Dept: GENERAL RADIOLOGY | Facility: CLINIC | Age: 43
End: 2023-02-09
Attending: EMERGENCY MEDICINE
Payer: COMMERCIAL

## 2023-02-09 ENCOUNTER — HOSPITAL ENCOUNTER (EMERGENCY)
Facility: CLINIC | Age: 43
Discharge: HOME | End: 2023-02-09
Attending: EMERGENCY MEDICINE | Admitting: EMERGENCY MEDICINE
Payer: COMMERCIAL

## 2023-02-09 VITALS
DIASTOLIC BLOOD PRESSURE: 101 MMHG | OXYGEN SATURATION: 97 % | SYSTOLIC BLOOD PRESSURE: 171 MMHG | RESPIRATION RATE: 20 BRPM | HEART RATE: 89 BPM | TEMPERATURE: 98.6 F

## 2023-02-09 DIAGNOSIS — R03.0 ELEVATED BLOOD PRESSURE READING WITHOUT DIAGNOSIS OF HYPERTENSION: ICD-10-CM

## 2023-02-09 DIAGNOSIS — J98.01 BRONCHOSPASM: ICD-10-CM

## 2023-02-09 DIAGNOSIS — R06.00 DYSPNEA, UNSPECIFIED TYPE: ICD-10-CM

## 2023-02-09 LAB
ANION GAP SERPL CALCULATED.3IONS-SCNC: 14 MMOL/L (ref 7–15)
BASOPHILS # BLD AUTO: 0.1 10E3/UL (ref 0–0.2)
BASOPHILS NFR BLD AUTO: 0 %
BUN SERPL-MCNC: 18 MG/DL (ref 6–20)
CALCIUM SERPL-MCNC: 9.5 MG/DL (ref 8.6–10)
CHLORIDE SERPL-SCNC: 103 MMOL/L (ref 98–107)
CREAT SERPL-MCNC: 0.53 MG/DL (ref 0.67–1.17)
DEPRECATED HCO3 PLAS-SCNC: 25 MMOL/L (ref 22–29)
EOSINOPHIL # BLD AUTO: 0.3 10E3/UL (ref 0–0.7)
EOSINOPHIL NFR BLD AUTO: 3 %
ERYTHROCYTE [DISTWIDTH] IN BLOOD BY AUTOMATED COUNT: 12.4 % (ref 10–15)
GFR SERPL CREATININE-BSD FRML MDRD: >90 ML/MIN/1.73M2
GLUCOSE SERPL-MCNC: 231 MG/DL (ref 70–99)
HCT VFR BLD AUTO: 49.7 % (ref 40–53)
HGB BLD-MCNC: 16.7 G/DL (ref 13.3–17.7)
HOLD SPECIMEN: NORMAL
HOLD SPECIMEN: NORMAL
IMM GRANULOCYTES # BLD: 0.1 10E3/UL
IMM GRANULOCYTES NFR BLD: 0 %
LYMPHOCYTES # BLD AUTO: 1.9 10E3/UL (ref 0.8–5.3)
LYMPHOCYTES NFR BLD AUTO: 17 %
MCH RBC QN AUTO: 31.3 PG (ref 26.5–33)
MCHC RBC AUTO-ENTMCNC: 33.6 G/DL (ref 31.5–36.5)
MCV RBC AUTO: 93 FL (ref 78–100)
MONOCYTES # BLD AUTO: 0.6 10E3/UL (ref 0–1.3)
MONOCYTES NFR BLD AUTO: 6 %
NEUTROPHILS # BLD AUTO: 8.2 10E3/UL (ref 1.6–8.3)
NEUTROPHILS NFR BLD AUTO: 74 %
NRBC # BLD AUTO: 0 10E3/UL
NRBC BLD AUTO-RTO: 0 /100
PLATELET # BLD AUTO: 170 10E3/UL (ref 150–450)
POTASSIUM SERPL-SCNC: 3.8 MMOL/L (ref 3.4–5.3)
RBC # BLD AUTO: 5.34 10E6/UL (ref 4.4–5.9)
SODIUM SERPL-SCNC: 142 MMOL/L (ref 136–145)
TROPONIN T SERPL HS-MCNC: 7 NG/L
WBC # BLD AUTO: 11.2 10E3/UL (ref 4–11)

## 2023-02-09 PROCEDURE — 85025 COMPLETE CBC W/AUTO DIFF WBC: CPT | Performed by: EMERGENCY MEDICINE

## 2023-02-09 PROCEDURE — 250N000011 HC RX IP 250 OP 636: Performed by: EMERGENCY MEDICINE

## 2023-02-09 PROCEDURE — 94640 AIRWAY INHALATION TREATMENT: CPT

## 2023-02-09 PROCEDURE — 36415 COLL VENOUS BLD VENIPUNCTURE: CPT | Performed by: EMERGENCY MEDICINE

## 2023-02-09 PROCEDURE — 84484 ASSAY OF TROPONIN QUANT: CPT | Performed by: EMERGENCY MEDICINE

## 2023-02-09 PROCEDURE — 96374 THER/PROPH/DIAG INJ IV PUSH: CPT

## 2023-02-09 PROCEDURE — 250N000009 HC RX 250

## 2023-02-09 PROCEDURE — 71046 X-RAY EXAM CHEST 2 VIEWS: CPT

## 2023-02-09 PROCEDURE — 99285 EMERGENCY DEPT VISIT HI MDM: CPT | Mod: 25

## 2023-02-09 PROCEDURE — 80048 BASIC METABOLIC PNL TOTAL CA: CPT | Performed by: EMERGENCY MEDICINE

## 2023-02-09 PROCEDURE — 93005 ELECTROCARDIOGRAM TRACING: CPT

## 2023-02-09 RX ORDER — IPRATROPIUM BROMIDE AND ALBUTEROL SULFATE 2.5; .5 MG/3ML; MG/3ML
SOLUTION RESPIRATORY (INHALATION)
Status: COMPLETED
Start: 2023-02-09 | End: 2023-02-09

## 2023-02-09 RX ORDER — METHYLPREDNISOLONE SODIUM SUCCINATE 125 MG/2ML
125 INJECTION, POWDER, LYOPHILIZED, FOR SOLUTION INTRAMUSCULAR; INTRAVENOUS ONCE
Status: COMPLETED | OUTPATIENT
Start: 2023-02-09 | End: 2023-02-09

## 2023-02-09 RX ORDER — ALBUTEROL SULFATE 90 UG/1
2 AEROSOL, METERED RESPIRATORY (INHALATION) EVERY 6 HOURS PRN
Qty: 18 G | Refills: 0 | Status: SHIPPED | OUTPATIENT
Start: 2023-02-09 | End: 2023-03-01

## 2023-02-09 RX ORDER — PREDNISONE 20 MG/1
40 TABLET ORAL DAILY
Qty: 10 TABLET | Refills: 0 | Status: SHIPPED | OUTPATIENT
Start: 2023-02-09 | End: 2023-02-14

## 2023-02-09 RX ADMIN — IPRATROPIUM BROMIDE AND ALBUTEROL SULFATE 3 ML: .5; 2.5 SOLUTION RESPIRATORY (INHALATION) at 19:44

## 2023-02-09 RX ADMIN — METHYLPREDNISOLONE SODIUM SUCCINATE 125 MG: 125 INJECTION, POWDER, FOR SOLUTION INTRAMUSCULAR; INTRAVENOUS at 20:05

## 2023-02-09 ASSESSMENT — ACTIVITIES OF DAILY LIVING (ADL): ADLS_ACUITY_SCORE: 35

## 2023-02-10 ENCOUNTER — PATIENT OUTREACH (OUTPATIENT)
Dept: ONCOLOGY | Facility: CLINIC | Age: 43
End: 2023-02-10

## 2023-02-10 LAB
ATRIAL RATE - MUSE: 86 BPM
DIASTOLIC BLOOD PRESSURE - MUSE: NORMAL MMHG
INTERPRETATION ECG - MUSE: NORMAL
P AXIS - MUSE: 0 DEGREES
PR INTERVAL - MUSE: 138 MS
QRS DURATION - MUSE: 74 MS
QT - MUSE: 388 MS
QTC - MUSE: 464 MS
R AXIS - MUSE: 43 DEGREES
SYSTOLIC BLOOD PRESSURE - MUSE: NORMAL MMHG
T AXIS - MUSE: 57 DEGREES
VENTRICULAR RATE- MUSE: 86 BPM

## 2023-02-10 NOTE — PROGRESS NOTES
"Pt reports he is still \"Wheezing bad and breathing heavy\", started steroids and albuterol inhaler last night post ED. Has questions about if he does not take OC.   Per Dr LEROY, do not start and stop, can cause pneumonitis. If doesn't Tx can be months to about a year. With Tx can be 7-8 years. Of course hard to predict. What the progression will look like is very individual, but Pt does have CNS involvement. Will see how albuterol and prednisone work over weekend.    Pt's main donaldo was about pain. Discussed the palliative team, what they do, and how they can help. Pt will discuss with family over weekend and let us know what he is choosing. I said we would enter a palliative referral based on his choice and level of pain at that time (urgent vs consult). Pt verbalized understanding.   "

## 2023-02-10 NOTE — ED PROVIDER NOTES
"    History     Chief Complaint:  Shortness of Breath       HPI   Connor Emerson is a 42 year old male with a history of lung cancer who presents for evaluation of sudden onset short of breath.  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  At approximately 5:30 PM, the patient felt \"off\" and at 6:30 PM became suddenly short of breath.  He presented here with wheezing noted and was administered a nebulizer, with significant proved.  He denies fevers, chills, chest pain, cough, or other acute concerns.  Of note: Patient was recently diagnosed with DVT and started on rivaroxaban.        Review of External Notes: Reviewed oncology visit from yesterday    ROS:  Review of Systems    Allergies:  Vicodin [Hydrocodone-Acetaminophen]     Medications:    alcohol swab prep pads  blood glucose (NO BRAND SPECIFIED) test strip  blood glucose calibration (NO BRAND SPECIFIED) solution  blood glucose monitoring (NO BRAND SPECIFIED) meter device kit  brigatinib (ALUNBRIG) 30 MG TABS tablet  citalopram (CELEXA) 20 MG tablet  Continuous Blood Gluc Sensor (FREESTYLE IRENE 2 SENSOR) MISC  hydrochlorothiazide (HYDRODIURIL) 25 MG tablet  insulin aspart (NOVOLOG PEN) 100 UNIT/ML pen  insulin glargine (LANTUS PEN) 100 UNIT/ML pen  insulin pen needle (31G X 8 MM) 31G X 8 MM miscellaneous  levETIRAcetam (KEPPRA) 1000 MG tablet  lisinopril (ZESTRIL) 40 MG tablet  metFORMIN (GLUCOPHAGE XR) 500 MG 24 hr tablet  methocarbamol (ROBAXIN) 500 MG tablet  oxyCODONE (ROXICODONE) 5 MG tablet  prochlorperazine (COMPAZINE) 10 MG tablet  prochlorperazine (COMPAZINE) 10 MG tablet  propranolol (INDERAL) 20 MG tablet  rivaroxaban ANTICOAGULANT (XARELTO) 20 MG TABS tablet  thin (NO BRAND SPECIFIED) lancets        Past Medical History:    Past Medical History:   Diagnosis Date     Atypical chest pain 12/02/2013     Cancer (H)      Complication of anesthesia      Diabetes (H)      GERD (gastroesophageal reflux disease) 12/02/2013     HTN " (hypertension) 05/14/2012     HTN, goal below 140/90 07/02/2013     Insomnia 02/21/2012     Mediastinal lymphadenopathy      Migraine headache 07/02/2013     Migraine with aura, without mention of intractable migraine without mention of status migrainosus      Pneumonia        Past Surgical History:    Past Surgical History:   Procedure Laterality Date     BRONCHOSCOPY RIGID OR FLEXIBLE W/TRANSENDOSCOPIC ENDOBRONCHIAL ULTRASOUND GUIDED Bilateral 1/26/2022    Procedure: Right BRONCHOSCOPY, FIBEROPTIC, endobronchial ultrasound, pleural biopsy;  Surgeon: Dallin Agrawal MD;  Location: [facility] OR     INJECT BLOCK MEDIAL BRANCH CERVICAL/THORACIC/LUMBAR       INSERT CHEST TUBE Right 2/16/2022    Procedure: INSERTION, CATHETER, INTERCOSTAL, FOR DRAINAGE;  Surgeon: Dallin Agrawal MD;  Location: [facility] GI     INSERT CHEST TUBE Right 3/9/2022    Procedure: INSERTION, CATHETER, INTERCOSTAL, FOR DRAINAGE;  Surgeon: Sushila Antonio MD;  Location: [facility] GI     IR CHEST TUBE REMOVAL TUNNELED RIGHT  4/2/2022     OPTICAL TRACKING SYSTEM CRANIOTOMY, EXCISE TUMOR, COMBINED Left 6/28/2022    Procedure: Left stealth craniotomy for tumor resection with motor mapping;  Surgeon: Stephen Moran MD;  Location:  OR     ORTHOPEDIC SURGERY      Ganesh. Rotator cuff repair.     PLEUROSCOPY N/A 1/26/2022    Procedure: Pleuroscopy with Pleural Biopsy;  Surgeon: Dallin Agrawal MD;  [facility]        Family History:    family history includes Melanoma in his maternal uncle; Stomach Cancer in his maternal grandfather; Unknown/Adopted in his father, maternal grandfather, maternal grandmother, mother, paternal grandfather, and paternal grandmother; Uterine Cancer in his mother.    Social History:   reports that he has never smoked. He has never used smokeless tobacco. He reports current alcohol use. He reports that he does not use drugs.  PCP: Radha Shetty Ra     Physical Exam     Patient Vitals for the past 24 hrs:   BP Temp Temp src Pulse Resp  SpO2   02/09/23 1945 (!) 188/118 -- -- 86 -- 99 %   02/09/23 1935 (!) 206/130 -- -- 90 -- 100 %   02/09/23 1920 (!) 208/128 98.6  F (37  C) Temporal -- -- --   02/09/23 1919 -- -- -- -- -- 100 %   02/09/23 1917 -- -- -- 91 20 --        Physical Exam  Constitutional: Alert, attentive  HENT:    Nose: Nose normal.    Mouth/Throat: Oropharynx is clear, mucous membranes are moist   Eyes: EOM are normal.   CV: regular rate and rhythm; no murmurs, rubs or gallups  Chest: Effort normal, trace residual end expiratory wheezing bilaterally   GI:  There is no tenderness. No distension. Normal bowel sounds  MSK: Normal range of motion. No LE edema  Neurological: Alert, attentive  Skin: Skin is warm and dry.      Emergency Department Course   ECG  Normal sinus rhythm, normal intervals and axis, rate 86, no STEMI.  No significant change from November 7, 2022    Imaging:  Chest XR,  PA & LAT   Final Result   IMPRESSION: Slightly elevated right hemidiaphragm with mild right basilar atelectasis or scarring, unchanged. No new airspace opacities, pleural effusions, or pneumothorax. Nonenlarged cardiac silhouette.        Report per radiology    Laboratory:  Labs Ordered and Resulted from Time of ED Arrival to Time of ED Departure   BASIC METABOLIC PANEL - Abnormal       Result Value    Sodium 142      Potassium 3.8      Chloride 103      Carbon Dioxide (CO2) 25      Anion Gap 14      Urea Nitrogen 18.0      Creatinine 0.53 (*)     Calcium 9.5      Glucose 231 (*)     GFR Estimate >90     CBC WITH PLATELETS AND DIFFERENTIAL - Abnormal    WBC Count 11.2 (*)     RBC Count 5.34      Hemoglobin 16.7      Hematocrit 49.7      MCV 93      MCH 31.3      MCHC 33.6      RDW 12.4      Platelet Count 170      % Neutrophils 74      % Lymphocytes 17      % Monocytes 6      % Eosinophils 3      % Basophils 0      % Immature Granulocytes 0      NRBCs per 100 WBC 0      Absolute Neutrophils 8.2      Absolute Lymphocytes 1.9      Absolute Monocytes 0.6       Absolute Eosinophils 0.3      Absolute Basophils 0.1      Absolute Immature Granulocytes 0.1      Absolute NRBCs 0.0     TROPONIN T, HIGH SENSITIVITY - Normal    Troponin T, High Sensitivity 7          Procedures       Emergency Department Course & Assessments:             Interventions:  Medications   ipratropium - albuterol 0.5 mg/2.5 mg/3 mL (DUONEB) 0.5-2.5 (3) MG/3ML neb solution (3 mLs  Given 2/9/23 1944)   methylPREDNISolone sodium succinate (solu-MEDROL) injection 125 mg (125 mg Intravenous Given 2/9/23 2005)        Independent Interpretation (X-rays, CTs, rhythm strip):  No wide mediastinum, pneumothorax, pneumonia on chest xray    Assessments:  On final recheck, patient remains without wheezing, dyspnea, or other acute concerns    Disposition:  The patient was discharged to home.     Impression & Plan      Medical Decision Making:  This is a 42-year-old male with history of lung cancer presents for relation of sudden onset shortness of breath.  Initial presentation was consistent with acute bronchospasm, possibly precipitated by exposure to dust via removing carpet today.  Symptoms are resolved after 1 DuoNeb.  PE is highly unlikely given patient's current NOAC therapy.  Chest x-ray shows no widened mediastinum, pneumothorax, pneumonia.  No signs or symptoms to suggest ACS.  On recheck, symptoms remain resolved.  In this context, where on 1 neb resolved bronchospasm, we had a shared decision making regarding possible prednisone I will defer at this time.  Will refill inhaler.  Primary care follow-up in 3 to 5 days return precautions for chest pain, shortness of breath, or any other concerns.  Finally, advised patient that his blood pressure has been elevated throughout this visit and the need to follow-up with primary care regarding this.  No signs or symptoms of hypertensive emergency.      Diagnosis:    ICD-10-CM    1. Dyspnea, unspecified type  R06.00       2. Elevated blood pressure reading without  diagnosis of hypertension  R03.0       3. Bronchospasm  J98.01            Discharge Medications:  Discharge Medication List as of 2/9/2023  9:01 PM      START taking these medications    Details   albuterol (PROAIR HFA/PROVENTIL HFA/VENTOLIN HFA) 108 (90 Base) MCG/ACT inhaler Inhale 2 puffs into the lungs every 6 hours as needed for shortness of breath, wheezing or cough, Disp-18 g, R-0, E-PrescribePharmacy may dispense brand covered by insurance (Proair, or proventil or ventolin or generic albuterol inhaler)                         Logan Thapa MD  02/09/23 [id number]

## 2023-02-10 NOTE — ED TRIAGE NOTES
Here for sob started bout 30 minutes ago associated with lightheadedness and headache. History of lung cancer. ABCs intact.      Triage Assessment     Row Name 02/09/23 1917       Triage Assessment (Adult)    Airway WDL WDL       Respiratory WDL    Respiratory WDL WDL       Cardiac WDL    Cardiac WDL WDL

## 2023-02-17 ENCOUNTER — TELEPHONE (OUTPATIENT)
Dept: ONCOLOGY | Facility: CLINIC | Age: 43
End: 2023-02-17
Payer: MEDICAID

## 2023-02-17 NOTE — ORAL ONC MGMT
Oral Chemotherapy Monitoring Program    Subjective/Objective:  Connor Emerson is a 42 year old male contacted by phone for a follow-up visit for oral chemotherapy.  Connor was contacted for an initial assessment of brigatinib, Connor let me know he actually hasn't started yet as he is concerned about the impact on his quality of life. Will send update to care team. Connor would be willing to move up his 3/3 appt with Chelsea Villegas to discuss further. He also mentions no significant improvement since 2/8 ER visit for wheezing, he is still taking the steroids prescribed and reports using the albuterol inhaler about 2x daily - 2 puffs each time, and still has some pain on his right side.    ORAL CHEMOTHERAPY 12/29/2022 12/30/2022 1/11/2023 1/11/2023 1/16/2023 2/8/2023 2/17/2023   Assessment Type Monthly Follow up Other Discontinuation Initial Work up New Teach Refill;Other Other   Stop Date - - 1/11/2023 - - - -   Reason for Discontinuation - - Unacceptable toxicity - - - -   Diagnosis Code Non-Small Cell Lung Cancer Non-Small Cell Lung Cancer Non-Small Cell Lung Cancer Non-Small Cell Lung Cancer Non-Small Cell Lung Cancer Non-Small Cell Lung Cancer Non-Small Cell Lung Cancer   Providers Dr Cori Persaud   [facility]   Drug Name Alecensa (alectinib) Alecensa (alectinib) Alecensa (alectinib) Alunbrig (brigatinib) Alunbrig (brigatinib) Alunbrig (brigatinib) Alunbrig (brigatinib)   Dose 300 mg 300 mg - Other: Other: 90 mg -   Current Schedule BID BID - Daily Daily Daily -   Cycle Details Continuous Drug on Hold - Continuous Continuous Other -   Start Date of Last Cycle - - - - - - -   Planned next cycle start date - - - 2/1/2023 2/1/2023 - -   Doses missed in last 2 weeks 0 - - - - - -   Adherence Assessment Adherent - - - - - -   Adverse Effects Myalgias/Arthralgias - - - - - -  "  Myalgias/Arthralgias Grade 3 - - - - - -   Pharmacist Intervention(myalgias/arthralgias) Yes - - - - - -   Intervention(s) Referral to emergency/urgent care;Patient education - - - - - -   Pharmacist Intervention(anemia) - - - - - - -   Intervention(s) - - - - - - -   Other (See Note for Details) - - - - - - -   Pharmacist intervention(other) - - - - - - -   Intervention(s) - - - - - - -   Any new drug interactions? No - - - Yes - -   Pharmacist Intervention? - - - - Yes - -   Intervention(s) - - - - Patient Education - -   Is the dose as ordered appropriate for the patient? Yes - - - Yes - -   Is the patient currently in pain? Yes - - - - - -   Does the patient feel the pain is currently being managed by a provider? No - - - - - -   Has the patient been assessed within the past 6 months for depression? No - - - - - -   Has the patient missed any days of school, work, or other routine activity? Yes - - - - - -   Days missed in the past month: More than 5 days - - - - - -   Since the last time we talked, have you been hospitalized or used the emergency room? Yes - - - - - -   What medical service did you use? Emergency Room - - - - - -       Last PHQ-2 Score on record:   PHQ-2 ( 1999 [employer]) 6/27/2022 2/24/2022   Q1: Little interest or pleasure in doing things 0 0   Q2: Feeling down, depressed or hopeless 0 0   PHQ-2 Score 0 0   PHQ-2 Total Score (12-17 Years)- Positive if 3 or more points; Administer PHQ-A if positive - -   Q1: Little interest or pleasure in doing things Not at all Not at all   Q2: Feeling down, depressed or hopeless Not at all Not at all   PHQ-2 Score 0 0       Vitals:  BP:   BP Readings from Last 1 Encounters:   02/09/23 (!) 171/101     Wt Readings from Last 1 Encounters:   01/09/23 97.8 kg (215 lb 11.2 oz)     Estimated body surface area is 2.18 meters squared as calculated from the following:    Height as of 11/16/22: 1.753 m (5' 9\").    Weight as of 1/9/23: 97.8 kg (215 lb 11.2 " oz).    Labs:  _  Result Component Current Result Ref Range   Sodium 142 (2/9/2023) 136 - 145 mmol/L     _  Result Component Current Result Ref Range   Potassium 3.8 (2/9/2023) 3.4 - 5.3 mmol/L     _  Result Component Current Result Ref Range   Calcium 9.5 (2/9/2023) 8.6 - 10.0 mg/dL     No results found for Mag within last 30 days.     No results found for Phos within last 30 days.     No results found for ALBUMIN within last 30 days.     _  Result Component Current Result Ref Range   Urea Nitrogen 18.0 (2/9/2023) 6.0 - 20.0 mg/dL     _  Result Component Current Result Ref Range   Creatinine 0.53 (L) (2/9/2023) 0.67 - 1.17 mg/dL     No results found for AST within last 30 days.     No results found for ALT within last 30 days.     No results found for BILITOTAL within last 30 days.     _  Result Component Current Result Ref Range   WBC Count 11.2 (H) (2/9/2023) 4.0 - 11.0 10e3/uL     _  Result Component Current Result Ref Range   Hemoglobin 16.7 (2/9/2023) 13.3 - 17.7 g/dL     _  Result Component Current Result Ref Range   Platelet Count 170 (2/9/2023) 150 - 450 10e3/uL     No results found for ANC within last 30 days.     _  Result Component Current Result Ref Range   Absolute Neutrophils 8.2 (2/9/2023) 1.6 - 8.3 10e3/uL            Follow-Up:  Message sent to care team re: plan?  Pharmacy will follow for updates.    Vanessa Diaz, PharmD, [facility]  Oral Chemotherapy Monitoring Program  [facility]  [phone]

## 2023-02-21 NOTE — PROGRESS NOTES
MEDICAL ONCOLOGY FOLLOW UP NOTE    PATIENT NAME: Connor Emerson  ENCOUNTER DATE: 2/22/2023    Care Team  Primary Oncologist: Bassam Persaud MD    REASON FOR CURRENT VISIT: F/u of lung cancer    HISTORY OF PRESENT ILLNESS:  Mr. Connor Emerson is a 42 year old  male who is a non-smoker with PMHx of T2DM, HTN with metastatic NSCLC comes for follow up     Oncologic Hx:    Diagnosis:   Stage IV NSCLC, Rt lung adenocarcinoma with metastasis to pleura, mediastinum , rt pleural effusion and brain diagnosed 1/2022 (AJCC 8th edition)  PD-L1 TPS 2-3% by [place]   NGS [facility] panel-EML4:ALK rearragement  NGS Guardant- GNAS R201H, KRAS K5E- No ALK    Treatment:   Current:    2/23/2022- Anticipated to start Brigatinib    3/2/22- 12/2022 Alectinib 300 mg BID (Dose reduced to 450 mg BID from 600 mg BID due to grade 3 myalgias 3/21/22, again reduced to 300 mg BID 9/28/22)    )  Past:  2/15/22- GK to 11 briain lesions  6/28/22- Craniotomy, resection  9/28/22-10/26- Bevacizumab for radiation necrosis (stopped due to PE)    Intent of treatment: Palliative    Oncologic course:  1/19/22 to 1/22/22-Admitted to [facility] for 2 week progressive SOB secondary to have large rt sided pleural effusion, needing thoracentesis x2 (1.7L and 2.0 L removed), cytology positive for malignancy, adenocarcinoma.   1/26/22- Rt pleural mass biopsy-Dr. Agrawal--POSITIVE FOR ADENOCARCINOMA CONSISTENT WITH LUNG PRIMARY, admixed with mesothelial hyperplasia and inflammatory infiltrate (+ TTF-1 and CK 7;  negative  p40, calretinin and WT-1. PAX8 immunostain focal +). 4th thoracentesis done simultaneously - 3L approx removed.   2/1/22- PET/CT-Right lower lobe central infiltrative FDG avid 8.2 x 9.6 cm mass representing a primary lung adenocarcinoma. Ipsilateral right perihilar, bilateral pretracheal, subcarinal and superior mediastinal michele metastases. Contralateral mildly FDG avid few lung nodules are suspicious for contralateral metastasis. At least 3 intracranial  metastases in the right frontal lobe, left frontal lobe and left cerebellar hemisphere. Nonspecific mild diffuse bone marrow uptake. Further evaluation with a spine MRI could be considered to rule out early marrow infiltration. This could also be seen with red marrow conversion.  2/5/22-  Brain MRI- At least 9 intracranial metastases as detailed above. The dominant lesions involving the orbital right frontal lobe, the posterior left middle frontal gyrus, anterior right temporal lobe and in the left cerebellar hemisphere have surrounding moderate vasogenic type edema.  2/15/22- Saw Dr. Arango from [facility] Onc- Rcd GK to 12 lesion in bran  2/16/22- Pleurex placement   3/2/22- Started Alectinib 600 mg BID  3/21/22- Dose reduced to 450 mg BID due to grade 3 myalgias and fatigue  4/2/22 to 4/5/22- Admitted at [facility] for- Severe sepsis due to MSSA infection of right PleurX catheter s/p removal- He presented with onset of pain at tube site starting 4/1; at arrival was tachycardic with leukocytosis (22.7) and elevated lactic acid (2.9).  CT chest showed fluid and stranding tracking outside the pleural space into chest wall along pleural catheter.  IR was consulted and removed catheter 4/2 with report of pustular drainage and tip culture growing MSSA.  Thoracic Surg was consulted who felt no surgical indication necessary given minimal pleural fluid and lack of any signs of abscess.  Initially treated with broad spectrum coverage for sepsis, narrowed to Ancef once sensitivities returned with plan to transition to cefadroxil for an additional 10 days at discharge per ID. Held drug 4/2 to 4/11 5/2/22- CT CAP- Overall, positive response to therapy with decreased size of right lower lobe and right pleural-based masses, pulmonary metastases, hilar and mediastinal lymphadenopathy. However, a single right posterior pleural-based mass has slightly increased in size since 2/24/2022. No metastatic disease in the abdomen and pelvis.  Right Pleurx catheter has been removed. Trace right pleural effusion and right basilar atelectasis.  5/2/22- Brain MRI- The previously demonstrated brain metastases are mildly diminished in size versus to 2/5/2022. The degree of edema is also diminished but not completely resolved. Probable trace amounts of intralesional bleeding demonstrated on the gradient sequence within the metastases. No definite new metastasis or progressive mass effect. No hydrocephalus or infarct.    6/15/22 to 6/17/22- Admitted at [facility]-with aphasia and word finding difficulty over last few weeks.  He presented to [facility] ED on 6/10 for evaluation of his symptoms. MRI brain showed multiple intracranial metastases, with interval enlargement of the dominant lesion within the left frontal lobe and increased surrounding vasogenic edema with 2 mm rightward shift of the septum pellucidum. Due to his worsening anxiety, he left AMA. His symptoms continued to progress to where he could not write at work so he decided to go to the ED for re-evaluation and treatment. Evaluated by NSGY, Rad Onc (radiaiton necrosis vs tumor progression).  6/16/22- MR Brain (6/16) shows multiple intracranial metastases, with interval enlargement  of the dominant lesion within the left frontal lobe and increased surrounding vasogenic edema with 2 mm rightward shift of the septum pellucidum.  6/16/22- - CT CAP shows slightly decreased size of right lower lobe and right pleural-based masses. No new pulmonary nodules or lymphadenopathy; No evidence of metastatic disease in the abdomen or pelvis.   6/28/22 to 6/30/22- Admitted at [facility]- Elective left Stealth craniotomy with resection of brain tumor due to ongoing symptoms. No intraoperative complications. EBL 50 ml.  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  7/5/22- Ct CAP- Right lower lobe low-density nodules are not significantly changed. A small left upper lobe pulmonary nodule is also unchanged. Trace pleural  fluid on the right has increased slightly. No convincing evidence for metastatic disease in the abdomen or pelvis.  7/18/22 to 7/19/22- Admitted to [facility] for seizure- Reportedly was only taking once Keppra instead of twice daily. Also resumed on dexamethasone 2 mg daily  8/1/22- Brain MRI- Redemonstrated postsurgical changes status post left frontoparietal Craniotomy. Interval increase in size of the dominant ring-enhancing lesion in the left posterior superior frontal lobe with increased moderate surrounding vasogenic edema and local mass effect resulting in narrowing of the supratentorial ventricular system. No significant midline shift/herniation at this time  8/1/22- Dex was increased to 4 mg daily by Dr. Moran  9/1/22- CT Chest- Near resolution of previously seen right pleural nodule. Stable right lower lobe pulmonary nodule  9/28/22- Bevacizumab for radiation necrosis  10/26/22- Bevacizumab   10/26/22- Ct CAP- Stable posterior medial right lower lobe 1.9 x 1.1 cm nodule series 8 image 176. Adjacent stable scarring and atelectasis. The previously noted pleural nodule posteriorly on the right is not currently clearly identified. Stable left upper lobe 0.3 cm nodule image 56  10/26/22- Brain MRI- Overall improved appearance of multiple intracranial metastases with near resolution of associated edema and diminished enhancement and size of multiple residual lesions. No definite new or progressive metastasis.  11/7/22 to 11/9/22- Admitted for PE and HTN urgency- Small pulmonary embolism in the right lower lobe pulmonary artery. started on Lovenox, Brain MRI neg for PRES.  12/29/22- ED visit- bilateral hip pain, pain in shoulders, knuckles, knees, and ankles- holding alectinib since 12/20/22 1/6/23- Ct CAP- Mild groundglass nodularity in the left upper lobe is new since the previous exam, and may be infectious in etiology. No other significant interval change. Pulmonary nodules are not significantly changed.  1/6/23-  Brain MRI- Stable to diminished sequelae of intracranial metastasis and treatment changes. No new or progressive metastasis. No superimposed acute intracranial finding.        He works as a maintenance manger for apartment complexes    Interval Hx:  Connor is here to follow up.   He has not yet started brigatinib, he had some question regarding prognosis.  He did not start it because he was fearing of the side effects of Rx especially pain.  I reassured him today that we will do our best to manage the potential SE, including pain.  Everything else is ok  He continues to work daily  Recently noticed cough is worse than usual, bringing up clear sputum  No fever or chills  Breathing is stable, no cough, no fever or chills.  Able to eat and drink well  Independent of ADL and IADL    ECOG PS 0    REVIEW OF SYSTEMS: 14 point ROS negative other than the symptoms noted above in the HPI.    Wt Readings from Last 4 Encounters:   01/09/23 97.8 kg (215 lb 11.2 oz)   12/29/22 97.5 kg (215 lb)   11/22/22 99.7 kg (219 lb 12.8 oz)   11/16/22 94.8 kg (209 lb)      Review of Systems:  A comprehensive ROS was performed and found to be negative or non-contributory with the exception of that noted in the HPI above.    Past Medical History:  GERD  Hypertension, not on medication  Type 2 diabetes mellitus, not on medications currently, previously on Metformin    Past Surgical History:  Past Surgical History:   Procedure Laterality Date     BRONCHOSCOPY RIGID OR FLEXIBLE W/TRANSENDOSCOPIC ENDOBRONCHIAL ULTRASOUND GUIDED Bilateral 1/26/2022    Procedure: Right BRONCHOSCOPY, FIBEROPTIC, endobronchial ultrasound, pleural biopsy;  Surgeon: Dallin Agrawal MD;  Location: [facility] OR     INJECT BLOCK MEDIAL BRANCH CERVICAL/THORACIC/LUMBAR       INSERT CHEST TUBE Right 2/16/2022    Procedure: INSERTION, CATHETER, INTERCOSTAL, FOR DRAINAGE;  Surgeon: Dallin Agrawal MD;  Location: [facility] GI     INSERT CHEST TUBE Right 3/9/2022    Procedure: INSERTION,  CATHETER, INTERCOSTAL, FOR DRAINAGE;  Surgeon: Sushila Antonio MD;  Location:  GI     IR CHEST TUBE REMOVAL TUNNELED RIGHT  2022     OPTICAL TRACKING SYSTEM CRANIOTOMY, EXCISE TUMOR, COMBINED Left 2022    Procedure: Left stealth craniotomy for tumor resection with motor mapping;  Surgeon: Stephen Moran MD;  Location:  OR     ORTHOPEDIC SURGERY      Ganesh. Rotator cuff repair.     PLEUROSCOPY N/A 2022    Procedure: Pleuroscopy with Pleural Biopsy;  Surgeon: Dallin Agrawal MD;  Location:  OR       Social History:  Lives with wife and 4 kids in [place]. Works as a  for an apartment complex in [place]. Exposure to household chemicals and . No significant exposure to asbestos. No signal exposure to benzene or similar chemicals. No significant smoking history-states that he smoked 1 to 2 cigarettes occasionally per month for about 2 years in college, non-smoking since then. No significant alcohol use history. No other recreational substances. Good support system. Kids are 23, 19, 17 and 13.    Family History  Significant history for cancers on maternal side. Mother  of uterine cancer. 2 maternal uncles have possible metastatic melanoma.    Outpatient Medications:  Current Outpatient Medications   Medication     albuterol (PROAIR HFA/PROVENTIL HFA/VENTOLIN HFA) 108 (90 Base) MCG/ACT inhaler     alcohol swab prep pads     blood glucose (NO BRAND SPECIFIED) test strip     blood glucose calibration (NO BRAND SPECIFIED) solution     blood glucose monitoring (NO BRAND SPECIFIED) meter device kit     brigatinib (ALUNBRIG) 30 MG TABS tablet     citalopram (CELEXA) 20 MG tablet     Continuous Blood Gluc Sensor (FREESTYLE IRENE 2 SENSOR) MISC     hydrochlorothiazide (HYDRODIURIL) 25 MG tablet     insulin aspart (NOVOLOG PEN) 100 UNIT/ML pen     insulin glargine (LANTUS PEN) 100 UNIT/ML pen     insulin pen needle (31G X 8 MM) 31G X 8 MM miscellaneous     levETIRAcetam  (KEPPRA) 1000 MG tablet     lisinopril (ZESTRIL) 40 MG tablet     metFORMIN (GLUCOPHAGE XR) 500 MG 24 hr tablet     methocarbamol (ROBAXIN) 500 MG tablet     oxyCODONE (ROXICODONE) 5 MG tablet     prochlorperazine (COMPAZINE) 10 MG tablet     prochlorperazine (COMPAZINE) 10 MG tablet     propranolol (INDERAL) 20 MG tablet     rivaroxaban ANTICOAGULANT (XARELTO) 20 MG TABS tablet     thin (NO BRAND SPECIFIED) lancets     No current facility-administered medications for this visit.     PHYSICAL EXAMINATION ATTAINABLE DURING VIDEO VISIT:  CONSTITUTIONAL - Pt looks like stated age, pleasant, not in acute distress. Not obese.  NEURO: Oriented to time, person, and places. No tremor.  ENT, MOUTH: Pupils are equal.  Sclerae are anicteric.  Moist oral mucosa. No oral thrush.   NECK:  No jugular venous distention.  No thyroid enlargement.   RESPIRATORY: talk nl, no sob during conversation, no cough.   MUSCULOSKELETAL/EXTREMITIES:  No edema.  No joint deformity. Normal range of motion  SKIN:  No petechiae.  No rash.  No signs of cellulitis.   PSYCHIATRIC: Normal mood and affect. Good memory. Proper insight and judgement.   THE REST OF A COMPREHENSIVE PHYSICIAL EXAM IS DEFERRED DUE TO COVID-19 PUBLIC HEALTH EMERGENCY RELATED VIDEO VISIT RESCTRICTION.      Labs & Studies: I personally reviewed the following studies:  Most Recent 3 CBC's:  Recent Labs   Lab Test 02/09/23 1929 01/09/23  1529 12/29/22  1841   WBC 11.2* 9.5 7.8   HGB 16.7 14.1 13.4   MCV 93 92 96    238 249     Most Recent 3 BMP's:  Recent Labs   Lab Test 02/09/23 1929 12/29/22  1841 11/09/22  1104 11/09/22  0731 11/08/22  0813 11/08/22  0618    144  --   --   --  140   POTASSIUM 3.8 3.4 3.4  --    < > 3.2*   CHLORIDE 103 104  --   --   --  103   CO2 25 27  --   --   --  23   BUN 18.0 20.4*  --   --   --  15.1   CR 0.53* 0.70  --   --   --  0.57*   ANIONGAP 14 13  --   --   --  14   TORY 9.5 9.5  --   --   --  8.4*   * 140*  --  158*   < > 147*     < > = values in this interval not displayed.    Most Recent 2 LFT's:  Recent Labs   Lab Test 12/29/22  1841 11/07/22  1531   AST 20 20   ALT 17 23   ALKPHOS 83 79   BILITOTAL 0.6 1.0    Most Recent TSH and T4:  Recent Labs   Lab Test 09/12/22  1642   TSH 2.56        ASSESSMENT AND PLAN:  Stage IV NSCLC, Rt lung adenocarcinoma with metastasis to pleura, mediastinum , rt pleural effusion and brain diagnosed 1/2022 (AJCC 8th edition)  PD-L1 TPS 2-3% by [place]   NGS [facility] panel-EML4:ALK rearragement; chr2:[id number], chr2:[id number]  NGS Guardant- GNAS R201H, KRAS K5E- No ALK    He began Alectinib 600 mg BID 3/2/22 and unfortunately developed grade 3 myalgias which have improved with lowering the dose 450 mg BID. He was holding drug 4/2/22 to 4/11/22 due to MSSA infection from pleurex which is removed. Resumed at 450 mg BID. Due to ongoing myalgias (Although CK is normal), we dose reduced to 300 mg BID.   In Dec 2022, developed Gr 3 arthralgia, and we stopped drug since 12/20/22. Had unremitting arthalgias, eventully had to starte PO steroids which led to improvement and resolved.   Overall he has had a near CR to Rx in the lung, has a residual rt LL lesion. Plan was to start brigatinib  2 weeks ago.However, he has not yet started it, he is fearing of the SE of Rx especially diffuse MSK pain that he had last time. I reassured him that we will help with pain management and also include a palliative care in our team.  Discussed that the incidence of arthralgias are similar but may be numerically slightly lower. Would avoid certinib due to lower CNS efficacy compared to brigatinib or lorlatininib. Would avoid lorlatinib due to cognitive issues given he has had similar symptoms with radiation necrosis.  Plan to start at lower dose 90 mg daily and stay there for 2 weeks -> 120 mg for 1 week->150 mg daily for 1 week-> then 180 mg daily  Overall prognosis for ALK rearrangement lung cancer is median overall survival > 7  years based on the most recent update of the WINSOME study.      Plan  RTC with NP/PA for tox check in 2 weeks   Other appts as scheduled    # Grade 3 arthralgias most likley related to alectinib, unremiittign with tylenol, NSAIDs or xocyodoen,  CK and aldolase has been normal   -All joints affected, no morning stiffness, normal ESRa nd CRP  Eventually started trial of prednisone 40 mg for 2 weeks, his symptoms resolbved immediately.   -Will get palliative care consult for pain management        # PE: provoked by malignancy vs bevacizumab in 11/2022  -New right-sided pleuritic chest pain, tachycardiam, CT showing rt sided sub segmental PE wo   -- on rivaroxiabn 20 mg daily        # G3 diastolic HTN - led to one dose hold of bevacizumab and now discontinued  Adm with /111. No evidence of end-organ damage. Most likley from Bevacizumab. Recent MRI brain revealed no PRES, stable necrotic/mets areas and no new enhancement.  - PTA HTCZ  - PTA lisinopril   -On propranolol 20mg BID  - Pt to follow up with PCP in regards to BP mgmt       # Brain mets:  # Radiation necrosis  Baseline Brain MRI with several brain mets, s/p GK to 11-12 brain mets. F/u Brain MRI in June was showing enlargement of the one of the lesions along with edema, therefore had to undergo craniotomy followed by resection, the final biopsy consistent with radiation necrosis.  Had issues with radiation necrosis and swelling requiring steroids but these were driving up blood sugars drastically  -Began bevacizumab for radiation necrosis --15 mg/kg every 3 weeks, plan for at least 3 months of therapy.  Dexamethasone taper through 10/31. S/p 2 doses of Bevacixumab now, last one 10/26 , but had to hold it given he had HTN urgency and PE.  -Recent Brain MRI showing Rx effect and resolution of radiation necrosis, will have to watch closely given he is off of bevaciuzumab  Next Brain MRI 4/2023    # Type 2 Diabetes: Self monitoring of blood glucose is not at goal  of fasting  mg/dL. Now off of dexamethasone. Started metformin 500mg ER every day    --Started using [employer] 2 continous glucose monitor  --FOllows medication management-   --on  Metformin and insulin  -- Novolog scale with correction--be sure to check blood sugars before eating and taking insulin with a meal       #grade 3 myalgias: CK has been normal. initially resolved after lowering dose though oCnnor recalls it has never really improved even with 300 mg BID dose  -off of rosuvastatin now    #hypokalemia: mild and he denies PO intake limitations thus will recheck with next weeks labs that are already scheduled    #normocytic anemia: sudden drop to <7, requiring 1 unit blood transfusion. Lab work up unrevealing. Has recovered to baseline  -consider EGD if hgb drops again, risk for GI bleed with chronic steroid use      #grade 2 fatigue: ongoing. Monitor any improvement with dose reduction     #MSSA infection, Sepsis at pleurex site- resolved  # Pleural effusion: s/p pleurex palcement  2/16/22. Then on 4/2 right PleurX catheter s/p removal for severe sepsis due to MSSA infection.    #dry eyes  #blurred vision  Continue artificial tears. Saw ophthalmology previosuly, changed prescription, mow improved.  No neuro sx.     #Psychiatry  # Situational Anxiety   - irritable at times, may be better off steroids. Declined referral to therapist and denies medication intervention for now. PCP can help manage this too if he has established relationship with him    #COVID vaccine: No COVID vaccine on record, did not discuss today.       On the day of service  Chart review: 5 minutes  Visit duration: 30 minutes  Care coordination: 5 minutes    Bassam Persaud MD    Hematology, Oncology and Transplantation    Video-Visit Details    Type of service:  Video Visit    Video Start Time (time video started): 2 pm    Video End Time (time video stopped): 2:30 pm    Originating Location (pt. Location):  Work        Distant Location (provider location):  On-site    Mode of Communication:  Video Conference via [employer]

## 2023-02-22 ENCOUNTER — VIRTUAL VISIT (OUTPATIENT)
Dept: ONCOLOGY | Facility: CLINIC | Age: 43
End: 2023-02-22
Attending: STUDENT IN AN ORGANIZED HEALTH CARE EDUCATION/TRAINING PROGRAM
Payer: COMMERCIAL

## 2023-02-22 DIAGNOSIS — I67.89 RADIATION THERAPY INDUCED BRAIN NECROSIS: Primary | ICD-10-CM
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
## 2023-02-22 DIAGNOSIS — C79.31 BRAIN METASTASIS: ICD-10-CM

## 2023-02-22 DIAGNOSIS — C34.31 MALIGNANT NEOPLASM OF LOWER LOBE OF RIGHT LUNG (H): ICD-10-CM

## 2023-02-22 PROCEDURE — 99215 OFFICE O/P EST HI 40 MIN: CPT | Mod: VID | Performed by: STUDENT IN AN ORGANIZED HEALTH CARE EDUCATION/TRAINING PROGRAM

## 2023-02-22 PROCEDURE — G0463 HOSPITAL OUTPT CLINIC VISIT: HCPCS | Mod: 95,25,GT | Performed by: STUDENT IN AN ORGANIZED HEALTH CARE EDUCATION/TRAINING PROGRAM

## 2023-02-22 NOTE — LETTER
2/22/2023         RE: Connor Emerson  [address]  [facility] [address]        Dear Colleague,    Thank you for referring your patient, Connor Emerson, to the [facility]. Please see a copy of my visit note below.        MEDICAL ONCOLOGY FOLLOW UP NOTE    PATIENT NAME: Connor Emerson  ENCOUNTER DATE: 2/22/2023    Care Team  Primary Oncologist: Bassam Persaud MD    REASON FOR CURRENT VISIT: F/u of lung cancer    HISTORY OF PRESENT ILLNESS:  Mr. Connor Emerson is a 42 year old  male who is a non-smoker with PMHx of T2DM, HTN with metastatic NSCLC comes for follow up     Oncologic Hx:    Diagnosis:   Stage IV NSCLC, Rt lung adenocarcinoma with metastasis to pleura, mediastinum , rt pleural effusion and brain diagnosed 1/2022 (AJCC 8th edition)  PD-L1 TPS 2-3% by [place]   NGS [facility] panel-EML4:ALK rearragement  NGS Guardant- GNAS R201H, KRAS K5E- No ALK    Treatment:   Current:    2/23/2022- Anticipated to start Brigatinib    3/2/22- 12/2022 Alectinib 300 mg BID (Dose reduced to 450 mg BID from 600 mg BID due to grade 3 myalgias 3/21/22, again reduced to 300 mg BID 9/28/22)    )  Past:  2/15/22- GK to 11 briain lesions  6/28/22- Craniotomy, resection  9/28/22-10/26- Bevacizumab for radiation necrosis (stopped due to PE)    Intent of treatment: Palliative    Oncologic course:  1/19/22 to 1/22/22-Admitted to [facility] for 2 week progressive SOB secondary to have large rt sided pleural effusion, needing thoracentesis x2 (1.7L and 2.0 L removed), cytology positive for malignancy, adenocarcinoma.   1/26/22- Rt pleural mass biopsy-Dr. Agrawal--POSITIVE FOR ADENOCARCINOMA CONSISTENT WITH LUNG PRIMARY, admixed with mesothelial hyperplasia and inflammatory infiltrate (+ TTF-1 and CK 7;  negative  p40, calretinin and WT-1. PAX8 immunostain focal +). 4th thoracentesis done simultaneously - 3L approx removed.   2/1/22- PET/CT-Right lower lobe central infiltrative FDG avid 8.2 x 9.6 cm mass  representing a primary lung adenocarcinoma. Ipsilateral right perihilar, bilateral pretracheal, subcarinal and superior mediastinal michele metastases. Contralateral mildly FDG avid few lung nodules are suspicious for contralateral metastasis. At least 3 intracranial metastases in the right frontal lobe, left frontal lobe and left cerebellar hemisphere. Nonspecific mild diffuse bone marrow uptake. Further evaluation with a spine MRI could be considered to rule out early marrow infiltration. This could also be seen with red marrow conversion.  2/5/22-  Brain MRI- At least 9 intracranial metastases as detailed above. The dominant lesions involving the orbital right frontal lobe, the posterior left middle frontal gyrus, anterior right temporal lobe and in the left cerebellar hemisphere have surrounding moderate vasogenic type edema.  2/15/22- Saw Dr. Arango from [facility] Onc- Rcd GK to 12 lesion in bran  2/16/22- Pleurex placement   3/2/22- Started Alectinib 600 mg BID  3/21/22- Dose reduced to 450 mg BID due to grade 3 myalgias and fatigue  4/2/22 to 4/5/22- Admitted at [facility] for- Severe sepsis due to MSSA infection of right PleurX catheter s/p removal- He presented with onset of pain at tube site starting 4/1; at arrival was tachycardic with leukocytosis (22.7) and elevated lactic acid (2.9).  CT chest showed fluid and stranding tracking outside the pleural space into chest wall along pleural catheter.  IR was consulted and removed catheter 4/2 with report of pustular drainage and tip culture growing MSSA.  Thoracic Surg was consulted who felt no surgical indication necessary given minimal pleural fluid and lack of any signs of abscess.  Initially treated with broad spectrum coverage for sepsis, narrowed to Ancef once sensitivities returned with plan to transition to cefadroxil for an additional 10 days at discharge per ID. Held drug 4/2 to 4/11 5/2/22- CT CAP- Overall, positive response to therapy with decreased  size of right lower lobe and right pleural-based masses, pulmonary metastases, hilar and mediastinal lymphadenopathy. However, a single right posterior pleural-based mass has slightly increased in size since 2/24/2022. No metastatic disease in the abdomen and pelvis. Right Pleurx catheter has been removed. Trace right pleural effusion and right basilar atelectasis.  5/2/22- Brain MRI- The previously demonstrated brain metastases are mildly diminished in size versus to 2/5/2022. The degree of edema is also diminished but not completely resolved. Probable trace amounts of intralesional bleeding demonstrated on the gradient sequence within the metastases. No definite new metastasis or progressive mass effect. No hydrocephalus or infarct.    6/15/22 to 6/17/22- Admitted at [facility]-with aphasia and word finding difficulty over last few weeks.  He presented to [facility] ED on 6/10 for evaluation of his symptoms. MRI brain showed multiple intracranial metastases, with interval enlargement of the dominant lesion within the left frontal lobe and increased surrounding vasogenic edema with 2 mm rightward shift of the septum pellucidum. Due to his worsening anxiety, he left AMA. His symptoms continued to progress to where he could not write at work so he decided to go to the ED for re-evaluation and treatment. Evaluated by NSGY, Rad Onc (radiaiton necrosis vs tumor progression).  6/16/22- MR Brain (6/16) shows multiple intracranial metastases, with interval enlargement  of the dominant lesion within the left frontal lobe and increased surrounding vasogenic edema with 2 mm rightward shift of the septum pellucidum.  6/16/22- - CT CAP shows slightly decreased size of right lower lobe and right pleural-based masses. No new pulmonary nodules or lymphadenopathy; No evidence of metastatic disease in the abdomen or pelvis.   6/28/22 to 6/30/22- Admitted at [facility]- Elective left Stealth craniotomy with resection of brain tumor due to ongoing  symptoms. No intraoperative complications. EBL 50 ml.  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  7/5/22- Ct CAP- Right lower lobe low-density nodules are not significantly changed. A small left upper lobe pulmonary nodule is also unchanged. Trace pleural fluid on the right has increased slightly. No convincing evidence for metastatic disease in the abdomen or pelvis.  7/18/22 to 7/19/22- Admitted to [facility] for seizure- Reportedly was only taking once Keppra instead of twice daily. Also resumed on dexamethasone 2 mg daily  8/1/22- Brain MRI- Redemonstrated postsurgical changes status post left frontoparietal Craniotomy. Interval increase in size of the dominant ring-enhancing lesion in the left posterior superior frontal lobe with increased moderate surrounding vasogenic edema and local mass effect resulting in narrowing of the supratentorial ventricular system. No significant midline shift/herniation at this time  8/1/22- Dex was increased to 4 mg daily by Dr. Moran  9/1/22- CT Chest- Near resolution of previously seen right pleural nodule. Stable right lower lobe pulmonary nodule  9/28/22- Bevacizumab for radiation necrosis  10/26/22- Bevacizumab   10/26/22- Ct CAP- Stable posterior medial right lower lobe 1.9 x 1.1 cm nodule series 8 image 176. Adjacent stable scarring and atelectasis. The previously noted pleural nodule posteriorly on the right is not currently clearly identified. Stable left upper lobe 0.3 cm nodule image 56  10/26/22- Brain MRI- Overall improved appearance of multiple intracranial metastases with near resolution of associated edema and diminished enhancement and size of multiple residual lesions. No definite new or progressive metastasis.  11/7/22 to 11/9/22- Admitted for PE and HTN urgency- Small pulmonary embolism in the right lower lobe pulmonary artery. started on Lovenox, Brain MRI neg for PRES.  12/29/22- ED visit- bilateral hip pain, pain in shoulders, knuckles, knees, and  ankles- holding alectinib since 12/20/22 1/6/23- Ct CAP- Mild groundglass nodularity in the left upper lobe is new since the previous exam, and may be infectious in etiology. No other significant interval change. Pulmonary nodules are not significantly changed.  1/6/23- Brain MRI- Stable to diminished sequelae of intracranial metastasis and treatment changes. No new or progressive metastasis. No superimposed acute intracranial finding.        He works as a maintenance manger for apartment complexes    Interval Hx:  Connor is here to follow up.   He has not yet started brigatinib, he had some question regarding prognosis.  He did not start it because he was fearing of the side effects of Rx especially pain.  I reassured him today that we will do our best to manage the potential SE, including pain.  Everything else is ok  He continues to work daily  Recently noticed cough is worse than usual, bringing up clear sputum  No fever or chills  Breathing is stable, no cough, no fever or chills.  Able to eat and drink well  Independent of ADL and IADL    ECOG PS 0    REVIEW OF SYSTEMS: 14 point ROS negative other than the symptoms noted above in the HPI.    Wt Readings from Last 4 Encounters:   01/09/23 97.8 kg (215 lb 11.2 oz)   12/29/22 97.5 kg (215 lb)   11/22/22 99.7 kg (219 lb 12.8 oz)   11/16/22 94.8 kg (209 lb)      Review of Systems:  A comprehensive ROS was performed and found to be negative or non-contributory with the exception of that noted in the HPI above.    Past Medical History:  GERD  Hypertension, not on medication  Type 2 diabetes mellitus, not on medications currently, previously on Metformin    Past Surgical History:  Past Surgical History:   Procedure Laterality Date     BRONCHOSCOPY RIGID OR FLEXIBLE W/TRANSENDOSCOPIC ENDOBRONCHIAL ULTRASOUND GUIDED Bilateral 1/26/2022    Procedure: Right BRONCHOSCOPY, FIBEROPTIC, endobronchial ultrasound, pleural biopsy;  Surgeon: Dallin Agrawal MD;  Location:  OR      INJECT BLOCK MEDIAL BRANCH CERVICAL/THORACIC/LUMBAR       INSERT CHEST TUBE Right 2022    Procedure: INSERTION, CATHETER, INTERCOSTAL, FOR DRAINAGE;  Surgeon: Dallin Agrawal MD;  Location: [facility] GI     INSERT CHEST TUBE Right 3/9/2022    Procedure: INSERTION, CATHETER, INTERCOSTAL, FOR DRAINAGE;  Surgeon: Sushila Antonio MD;  Location:  GI     IR CHEST TUBE REMOVAL TUNNELED RIGHT  2022     OPTICAL TRACKING SYSTEM CRANIOTOMY, EXCISE TUMOR, COMBINED Left 2022    Procedure: Left stealth craniotomy for tumor resection with motor mapping;  Surgeon: Stephen Moran MD;  Location:  OR     ORTHOPEDIC SURGERY      Ganesh. Rotator cuff repair.     PLEUROSCOPY N/A 2022    Procedure: Pleuroscopy with Pleural Biopsy;  Surgeon: Dallin Agrawal MD;  Location:  OR       Social History:  Lives with wife and 4 kids in [place]. Works as a  for an apartment complex in [place]. Exposure to household chemicals and . No significant exposure to asbestos. No signal exposure to benzene or similar chemicals. No significant smoking history-states that he smoked 1 to 2 cigarettes occasionally per month for about 2 years in college, non-smoking since then. No significant alcohol use history. No other recreational substances. Good support system. Kids are 23, 19, 17 and 13.    Family History  Significant history for cancers on maternal side. Mother  of uterine cancer. 2 maternal uncles have possible metastatic melanoma.    Outpatient Medications:  Current Outpatient Medications   Medication     albuterol (PROAIR HFA/PROVENTIL HFA/VENTOLIN HFA) 108 (90 Base) MCG/ACT inhaler     alcohol swab prep pads     blood glucose (NO BRAND SPECIFIED) test strip     blood glucose calibration (NO BRAND SPECIFIED) solution     blood glucose monitoring (NO BRAND SPECIFIED) meter device kit     brigatinib (ALUNBRIG) 30 MG TABS tablet     citalopram (CELEXA) 20 MG tablet     Continuous Blood Gluc  Sensor (FREESTYLE IRENE 2 SENSOR) MISC     hydrochlorothiazide (HYDRODIURIL) 25 MG tablet     insulin aspart (NOVOLOG PEN) 100 UNIT/ML pen     insulin glargine (LANTUS PEN) 100 UNIT/ML pen     insulin pen needle (31G X 8 MM) 31G X 8 MM miscellaneous     levETIRAcetam (KEPPRA) 1000 MG tablet     lisinopril (ZESTRIL) 40 MG tablet     metFORMIN (GLUCOPHAGE XR) 500 MG 24 hr tablet     methocarbamol (ROBAXIN) 500 MG tablet     oxyCODONE (ROXICODONE) 5 MG tablet     prochlorperazine (COMPAZINE) 10 MG tablet     prochlorperazine (COMPAZINE) 10 MG tablet     propranolol (INDERAL) 20 MG tablet     rivaroxaban ANTICOAGULANT (XARELTO) 20 MG TABS tablet     thin (NO BRAND SPECIFIED) lancets     No current facility-administered medications for this visit.     PHYSICAL EXAMINATION ATTAINABLE DURING VIDEO VISIT:  CONSTITUTIONAL - Pt looks like stated age, pleasant, not in acute distress. Not obese.  NEURO: Oriented to time, person, and places. No tremor.  ENT, MOUTH: Pupils are equal.  Sclerae are anicteric.  Moist oral mucosa. No oral thrush.   NECK:  No jugular venous distention.  No thyroid enlargement.   RESPIRATORY: talk nl, no sob during conversation, no cough.   MUSCULOSKELETAL/EXTREMITIES:  No edema.  No joint deformity. Normal range of motion  SKIN:  No petechiae.  No rash.  No signs of cellulitis.   PSYCHIATRIC: Normal mood and affect. Good memory. Proper insight and judgement.   THE REST OF A COMPREHENSIVE PHYSICIAL EXAM IS DEFERRED DUE TO COVID-19 PUBLIC HEALTH EMERGENCY RELATED VIDEO VISIT RESCTRICTION.      Labs & Studies: I personally reviewed the following studies:  Most Recent 3 CBC's:  Recent Labs   Lab Test 02/09/23 1929 01/09/23  1529 12/29/22  1841   WBC 11.2* 9.5 7.8   HGB 16.7 14.1 13.4   MCV 93 92 96    238 249     Most Recent 3 BMP's:  Recent Labs   Lab Test 02/09/23  1929 12/29/22  1841 11/09/22  1104 11/09/22  0731 11/08/22  0813 11/08/22  0618    144  --   --   --  140   POTASSIUM 3.8 3.4  3.4  --    < > 3.2*   CHLORIDE 103 104  --   --   --  103   CO2 25 27  --   --   --  23   BUN 18.0 20.4*  --   --   --  15.1   CR 0.53* 0.70  --   --   --  0.57*   ANIONGAP 14 13  --   --   --  14   TORY 9.5 9.5  --   --   --  8.4*   * 140*  --  158*   < > 147*    < > = values in this interval not displayed.    Most Recent 2 LFT's:  Recent Labs   Lab Test 12/29/22  1841 11/07/22  1531   AST 20 20   ALT 17 23   ALKPHOS 83 79   BILITOTAL 0.6 1.0    Most Recent TSH and T4:  Recent Labs   Lab Test 09/12/22  1642   TSH 2.56        ASSESSMENT AND PLAN:  Stage IV NSCLC, Rt lung adenocarcinoma with metastasis to pleura, mediastinum , rt pleural effusion and brain diagnosed 1/2022 (AJCC 8th edition)  PD-L1 TPS 2-3% by [facility]   NGS [facility] panel-EML4:ALK rearragement; chr2:[id number], chr2:[id number]  NGS Guardant- GNAS R201H, KRAS K5E- No ALK    He began Alectinib 600 mg BID 3/2/22 and unfortunately developed grade 3 myalgias which have improved with lowering the dose 450 mg BID. He was holding drug 4/2/22 to 4/11/22 due to MSSA infection from pleurex which is removed. Resumed at 450 mg BID. Due to ongoing myalgias (Although CK is normal), we dose reduced to 300 mg BID.   In Dec 2022, developed Gr 3 arthralgia, and we stopped drug since 12/20/22. Had unremitting arthalgias, eventully had to starte PO steroids which led to improvement and resolved.   Overall he has had a near CR to Rx in the lung, has a residual rt LL lesion. Plan was to start brigatinib  2 weeks ago.However, he has not yet started it, he is fearing of the SE of Rx especially diffuse MSK pain that he had last time. I reassured him that we will help with pain management and also include a palliative care in our team.  Discussed that the incidence of arthralgias are similar but may be numerically slightly lower. Would avoid certinib due to lower CNS efficacy compared to brigatinib or lorlatininib. Would avoid lorlatinib due to cognitive issues given he has  had similar symptoms with radiation necrosis.  Plan to start at lower dose 90 mg daily and stay there for 2 weeks -> 120 mg for 1 week->150 mg daily for 1 week-> then 180 mg daily  Overall prognosis for ALK rearrangement lung cancer is median overall survival > 7 years based on the most recent update of the WINSOME study.      Plan  RTC with NP/PA for tox check in 2 weeks   Other appts as scheduled    # Grade 3 arthralgias most likley related to alectinib, unremiittign with tylenol, NSAIDs or xocyodoen,  CK and aldolase has been normal   -All joints affected, no morning stiffness, normal ESRa nd CRP  Eventually started trial of prednisone 40 mg for 2 weeks, his symptoms resolbved immediately.   -Will get palliative care consult for pain management        # PE: provoked by malignancy vs bevacizumab in 11/2022  -New right-sided pleuritic chest pain, tachycardiam, CT showing rt sided sub segmental PE wo   -- on rivaroxiabn 20 mg daily        # G3 diastolic HTN - led to one dose hold of bevacizumab and now discontinued  Adm with /111. No evidence of end-organ damage. Most likley from Bevacizumab. Recent MRI brain revealed no PRES, stable necrotic/mets areas and no new enhancement.  - PTA HTCZ  - PTA lisinopril   -On propranolol 20mg BID  - Pt to follow up with PCP in regards to BP mgmt       # Brain mets:  # Radiation necrosis  Baseline Brain MRI with several brain mets, s/p GK to 11-12 brain mets. F/u Brain MRI in June was showing enlargement of the one of the lesions along with edema, therefore had to undergo craniotomy followed by resection, the final biopsy consistent with radiation necrosis.  Had issues with radiation necrosis and swelling requiring steroids but these were driving up blood sugars drastically  -Began bevacizumab for radiation necrosis --15 mg/kg every 3 weeks, plan for at least 3 months of therapy.  Dexamethasone taper through 10/31. S/p 2 doses of Bevacixumab now, last one 10/26 , but had to  hold it given he had HTN urgency and PE.  -Recent Brain MRI showing Rx effect and resolution of radiation necrosis, will have to watch closely given he is off of bevaciuzumab  Next Brain MRI 4/2023    # Type 2 Diabetes: Self monitoring of blood glucose is not at goal of fasting  mg/dL. Now off of dexamethasone. Started metformin 500mg ER every day    --Started using [employer] 2 continous glucose monitor  --FOllows medication management-   --on  Metformin and insulin  -- Novolog scale with correction--be sure to check blood sugars before eating and taking insulin with a meal       #grade 3 myalgias: CK has been normal. initially resolved after lowering dose though Connor recalls it has never really improved even with 300 mg BID dose  -off of rosuvastatin now    #hypokalemia: mild and he denies PO intake limitations thus will recheck with next weeks labs that are already scheduled    #normocytic anemia: sudden drop to <7, requiring 1 unit blood transfusion. Lab work up unrevealing. Has recovered to baseline  -consider EGD if hgb drops again, risk for GI bleed with chronic steroid use      #grade 2 fatigue: ongoing. Monitor any improvement with dose reduction     #MSSA infection, Sepsis at pleurex site- resolved  # Pleural effusion: s/p pleurex palcement  2/16/22. Then on 4/2 right PleurX catheter s/p removal for severe sepsis due to MSSA infection.    #dry eyes  #blurred vision  Continue artificial tears. Saw ophthalmology previosuly, changed prescription, mow improved.  No neuro sx.     #Psychiatry  # Situational Anxiety   - irritable at times, may be better off steroids. Declined referral to therapist and denies medication intervention for now. PCP can help manage this too if he has established relationship with him    #COVID vaccine: No COVID vaccine on record, did not discuss today.       On the day of service  Chart review: 5 minutes  Visit duration: 30 minutes  Care coordination: 5 minutes    Bassam Persaud   MD    Hematology, Oncology and Transplantation    Video-Visit Details    Type of service:  Video Visit  Video Start Time (time video started): 2 pm  Video End Time (time video stopped): 2:30 pm  Originating Location (pt. Location): Work  Distant Location (provider location):  On-site    Mode of Communication:  Video Conference via [employer]

## 2023-02-22 NOTE — NURSING NOTE
Is the patient currently in the state of [place]? YES    Visit mode:VIDEO    If the visit is dropped, the patient can be reconnected by: VIDEO VISIT: Text to cell phone: [phone]    Will anyone else be joining the visit? NO      How would you like to obtain your AVS? MyChart    Are changes needed to the allergy or medication list? NO    Reason for visit: Connor Emerson 42 year old male presents today for f/u on NSCLC.  Loli Bravo, Virtual Facilitator

## 2023-03-01 ENCOUNTER — VIRTUAL VISIT (OUTPATIENT)
Dept: PALLIATIVE CARE | Facility: CLINIC | Age: 43
End: 2023-03-01
Attending: FAMILY MEDICINE
Payer: COMMERCIAL

## 2023-03-01 DIAGNOSIS — C79.31 NSCLC METASTATIC TO BRAIN (H): Primary | ICD-10-CM

## 2023-03-01 DIAGNOSIS — C34.90 NSCLC METASTATIC TO BRAIN (H): Primary | ICD-10-CM

## 2023-03-01 NOTE — NURSING NOTE
Is the patient currently in the state of [place]? YES    Visit mode:VIDEO    If the visit is dropped, the patient can be reconnected by: VIDEO VISIT: Text to cell phone: [phone]    Will anyone else be joining the visit? NO      How would you like to obtain your AVS? MyChart    Are changes needed to the allergy or medication list? NO     Patient declined individual allergy and medication review by support staff because pt states nothing has changed since last reviewed on February 22nd 2023.    Reason for visit: New Patient Visit    Herber VAZ

## 2023-03-01 NOTE — LETTER
3/1/2023       RE: Connor Emerson  [address]  [facility] [address]     Dear Colleague,    Thank you for referring your patient, Connor Emerson, to the [facility] [place] [facility] at [facility]. Please see a copy of my visit note below.    Video-Visit Details      I was not able to connect with Connor either through [employer] or [employer] today.  The MILIND Craven was able to speak to him very briefly when his phone froze and despite my calling him three times--twice via a land line and the third via the evidanza bravo we did not connect.  I will send him a [employer] message urging him to reschedule ASAP.    Ajith Brewer MD MS FAAFP  [facility]  Office [phone]  Fax [phone]

## 2023-03-01 NOTE — PROGRESS NOTES
Video-Visit Details    Type of service:  Video Visit    Video Start Time (time video started): 124pm    Video End Time (time video stopped): 134 pm    Originating Location (pt. Location): Home        Distant Location (provider location):  Off-site    Mode of Communication:  Video Conference via [facility] ONCOLOGY FOLLOW UP NOTE    PATIENT NAME: Connor Emerson  ENCOUNTER DATE: 3/3/2023    Care Team  Primary Oncologist: Bassam Persaud MD    REASON FOR CURRENT VISIT: F/u of lung cancer    HISTORY OF PRESENT ILLNESS:  Mr. Connor Emerson is a 42 year old  male who is a non-smoker with PMHx of T2DM, HTN with metastatic NSCLC comes for follow up     Oncologic Hx:    Diagnosis:   Stage IV NSCLC, Rt lung adenocarcinoma with metastasis to pleura, mediastinum , rt pleural effusion and brain diagnosed 1/2022 (AJCC 8th edition)  PD-L1 TPS 2-3% by [place]   NGS [facility] panel-EML4:ALK rearragement  NGS Guardant- GNAS R201H, KRAS K5E- No ALK    Treatment:    2/23/2022- current: Brigatinib. Dose reduced to 60 mg due to cough after two days of 90 mg daily     3/2/22- 12/2022 Alectinib 300 mg BID (Dose reduced to 450 mg BID from 600 mg BID due to grade 3 myalgias 3/21/22, again reduced to 300 mg BID 9/28/22)    )  Past:  2/15/22- GK to 11 briain lesions  6/28/22- Craniotomy, resection  9/28/22-10/26- Bevacizumab for radiation necrosis (stopped due to PE)    Intent of treatment: Palliative    Oncologic course:  1/19/22 to 1/22/22-Admitted to [facility] for 2 week progressive SOB secondary to have large rt sided pleural effusion, needing thoracentesis x2 (1.7L and 2.0 L removed), cytology positive for malignancy, adenocarcinoma.   1/26/22- Rt pleural mass biopsy-Dr. Agrawal--POSITIVE FOR ADENOCARCINOMA CONSISTENT WITH LUNG PRIMARY, admixed with mesothelial hyperplasia and inflammatory infiltrate (+ TTF-1 and CK 7;  negative  p40, calretinin and WT-1. PAX8 immunostain focal +). 4th thoracentesis done simultaneously - 3L approx  removed.   2/1/22- PET/CT-Right lower lobe central infiltrative FDG avid 8.2 x 9.6 cm mass representing a primary lung adenocarcinoma. Ipsilateral right perihilar, bilateral pretracheal, subcarinal and superior mediastinal michele metastases. Contralateral mildly FDG avid few lung nodules are suspicious for contralateral metastasis. At least 3 intracranial metastases in the right frontal lobe, left frontal lobe and left cerebellar hemisphere. Nonspecific mild diffuse bone marrow uptake. Further evaluation with a spine MRI could be considered to rule out early marrow infiltration. This could also be seen with red marrow conversion.  2/5/22-  Brain MRI- At least 9 intracranial metastases as detailed above. The dominant lesions involving the orbital right frontal lobe, the posterior left middle frontal gyrus, anterior right temporal lobe and in the left cerebellar hemisphere have surrounding moderate vasogenic type edema.  2/15/22- Saw Dr. Arango from Rad Onc- Rcd GK to 12 lesion in bran  2/16/22- Pleurex placement   3/2/22- Started Alectinib 600 mg BID  3/21/22- Dose reduced to 450 mg BID due to grade 3 myalgias and fatigue  4/2/22 to 4/5/22- Admitted at [facility] for- Severe sepsis due to MSSA infection of right PleurX catheter s/p removal- He presented with onset of pain at tube site starting 4/1; at arrival was tachycardic with leukocytosis (22.7) and elevated lactic acid (2.9).  CT chest showed fluid and stranding tracking outside the pleural space into chest wall along pleural catheter.  IR was consulted and removed catheter 4/2 with report of pustular drainage and tip culture growing MSSA.  Thoracic Surg was consulted who felt no surgical indication necessary given minimal pleural fluid and lack of any signs of abscess.  Initially treated with broad spectrum coverage for sepsis, narrowed to Ancef once sensitivities returned with plan to transition to cefadroxil for an additional 10 days at discharge per ID. Held  drug 4/2 to 4/11 5/2/22- CT CAP- Overall, positive response to therapy with decreased size of right lower lobe and right pleural-based masses, pulmonary metastases, hilar and mediastinal lymphadenopathy. However, a single right posterior pleural-based mass has slightly increased in size since 2/24/2022. No metastatic disease in the abdomen and pelvis. Right Pleurx catheter has been removed. Trace right pleural effusion and right basilar atelectasis.  5/2/22- Brain MRI- The previously demonstrated brain metastases are mildly diminished in size versus to 2/5/2022. The degree of edema is also diminished but not completely resolved. Probable trace amounts of intralesional bleeding demonstrated on the gradient sequence within the metastases. No definite new metastasis or progressive mass effect. No hydrocephalus or infarct.    6/15/22 to 6/17/22- Admitted at [facility]-with aphasia and word finding difficulty over last few weeks.  He presented to [facility] ED on 6/10 for evaluation of his symptoms. MRI brain showed multiple intracranial metastases, with interval enlargement of the dominant lesion within the left frontal lobe and increased surrounding vasogenic edema with 2 mm rightward shift of the septum pellucidum. Due to his worsening anxiety, he left AMA. His symptoms continued to progress to where he could not write at work so he decided to go to the ED for re-evaluation and treatment. Evaluated by NSGY, Rad Onc (radiaiton necrosis vs tumor progression).  6/16/22- MR Brain (6/16) shows multiple intracranial metastases, with interval enlargement  of the dominant lesion within the left frontal lobe and increased surrounding vasogenic edema with 2 mm rightward shift of the septum pellucidum.  6/16/22- - CT CAP shows slightly decreased size of right lower lobe and right pleural-based masses. No new pulmonary nodules or lymphadenopathy; No evidence of metastatic disease in the abdomen or pelvis.   6/28/22 to 6/30/22- Admitted  at [facility]- Elective left Stealth craniotomy with resection of brain tumor due to ongoing symptoms. No intraoperative complications. EBL 50 ml.  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  7/5/22- Ct CAP- Right lower lobe low-density nodules are not significantly changed. A small left upper lobe pulmonary nodule is also unchanged. Trace pleural fluid on the right has increased slightly. No convincing evidence for metastatic disease in the abdomen or pelvis.  7/18/22 to 7/19/22- Admitted to [facility] for seizure- Reportedly was only taking once Keppra instead of twice daily. Also resumed on dexamethasone 2 mg daily  8/1/22- Brain MRI- Redemonstrated postsurgical changes status post left frontoparietal Craniotomy. Interval increase in size of the dominant ring-enhancing lesion in the left posterior superior frontal lobe with increased moderate surrounding vasogenic edema and local mass effect resulting in narrowing of the supratentorial ventricular system. No significant midline shift/herniation at this time  8/1/22- Dex was increased to 4 mg daily by Dr. Moran  9/1/22- CT Chest- Near resolution of previously seen right pleural nodule. Stable right lower lobe pulmonary nodule  9/28/22- Bevacizumab for radiation necrosis  10/26/22- Bevacizumab   10/26/22- Ct CAP- Stable posterior medial right lower lobe 1.9 x 1.1 cm nodule series 8 image 176. Adjacent stable scarring and atelectasis. The previously noted pleural nodule posteriorly on the right is not currently clearly identified. Stable left upper lobe 0.3 cm nodule image 56  10/26/22- Brain MRI- Overall improved appearance of multiple intracranial metastases with near resolution of associated edema and diminished enhancement and size of multiple residual lesions. No definite new or progressive metastasis.  11/7/22 to 11/9/22- Admitted for PE and HTN urgency- Small pulmonary embolism in the right lower lobe pulmonary artery. started on Lovenox, Brain MRI neg for  PRES.  12/29/22- ED visit- bilateral hip pain, pain in shoulders, knuckles, knees, and ankles- holding alectinib since 12/20/22 1/6/23- Ct CAP- Mild groundglass nodularity in the left upper lobe is new since the previous exam, and may be infectious in etiology. No other significant interval change. Pulmonary nodules are not significantly changed.  1/6/23- Brain MRI- Stable to diminished sequelae of intracranial metastasis and treatment changes. No new or progressive metastasis. No superimposed acute intracranial finding.      He works as a maintenance manger for [email or web address]    Interval Hx:  Connor is here to follow up on his cough. He reports that day 1 of brigatinib he developed a terrible cough. The cough was hard to stop once it started and felt like a constant tickle. Within 24 hours of holding the brigatinib at our direction, his cough improved. Now, 48 hours after holding, he feels he is back to baseline.     He has pain on the right side of his chest that feels sore. This started with his cough and is a new pain. He denies any changes in his work of breathing, denies acute sob or change in activity tolerance. He is wheezy at times, using his inhaler helps.     ECOG PS 0    REVIEW OF SYSTEMS: 14 point ROS negative other than the symptoms noted above in the HPI.    Wt Readings from Last 4 Encounters:   01/09/23 97.8 kg (215 lb 11.2 oz)   12/29/22 97.5 kg (215 lb)   11/22/22 99.7 kg (219 lb 12.8 oz)   11/16/22 94.8 kg (209 lb)      Review of Systems:  A comprehensive ROS was performed and found to be negative or non-contributory with the exception of that noted in the HPI above.    Past Medical History:  GERD  Hypertension, not on medication  Type 2 diabetes mellitus, not on medications currently, previously on Metformin    Past Surgical History:  Past Surgical History:   Procedure Laterality Date     BRONCHOSCOPY RIGID OR FLEXIBLE W/TRANSENDOSCOPIC ENDOBRONCHIAL ULTRASOUND GUIDED Bilateral 1/26/2022     Procedure: Right BRONCHOSCOPY, FIBEROPTIC, endobronchial ultrasound, pleural biopsy;  Surgeon: Dallin Agrawal MD;  Location: [facility] OR     INJECT BLOCK MEDIAL BRANCH CERVICAL/THORACIC/LUMBAR       INSERT CHEST TUBE Right 2022    Procedure: INSERTION, CATHETER, INTERCOSTAL, FOR DRAINAGE;  Surgeon: Dallin Agrawal MD;  Location: [facility] GI     INSERT CHEST TUBE Right 3/9/2022    Procedure: INSERTION, CATHETER, INTERCOSTAL, FOR DRAINAGE;  Surgeon: Sushila Antonio MD;  Location: [facility] GI     IR CHEST TUBE REMOVAL TUNNELED RIGHT  2022     OPTICAL TRACKING SYSTEM CRANIOTOMY, EXCISE TUMOR, COMBINED Left 2022    Procedure: Left stealth craniotomy for tumor resection with motor mapping;  Surgeon: Stephen Moran MD;  Location:  OR     ORTHOPEDIC SURGERY      Ganesh. Rotator cuff repair.     PLEUROSCOPY N/A 2022    Procedure: Pleuroscopy with Pleural Biopsy;  Surgeon: Dallin Agrawal MD;  Location:  OR       Social History:  Lives with wife and 4 kids in [place]. Works as a  for an [employer] complex in [place]. Exposure to household chemicals and . No significant exposure to asbestos. No signal exposure to benzene or similar chemicals. No significant smoking history-states that he smoked 1 to 2 cigarettes occasionally per month for about 2 years in college, non-smoking since then. No significant alcohol use history. No other recreational substances. Good support system. Kids are 23, 19, 17 and 13.    Family History  Significant history for cancers on maternal side. Mother  of uterine cancer. 2 maternal uncles have possible metastatic melanoma.    Outpatient Medications:  Current Outpatient Medications   Medication     albuterol (PROAIR HFA/PROVENTIL HFA/VENTOLIN HFA) 108 (90 Base) MCG/ACT inhaler     alcohol swab prep pads     blood glucose (NO BRAND SPECIFIED) test strip     blood glucose calibration (NO BRAND SPECIFIED) solution     blood glucose monitoring (NO BRAND  SPECIFIED) meter device kit     brigatinib (ALUNBRIG) 30 MG TABS tablet     citalopram (CELEXA) 20 MG tablet     Continuous Blood Gluc Sensor (FREESTYLE IRENE 2 SENSOR) MISC     hydrochlorothiazide (HYDRODIURIL) 25 MG tablet     insulin aspart (NOVOLOG PEN) 100 UNIT/ML pen     insulin glargine (LANTUS PEN) 100 UNIT/ML pen     insulin pen needle (31G X 8 MM) 31G X 8 MM miscellaneous     levETIRAcetam (KEPPRA) 1000 MG tablet     lisinopril (ZESTRIL) 40 MG tablet     metFORMIN (GLUCOPHAGE XR) 500 MG 24 hr tablet     methocarbamol (ROBAXIN) 500 MG tablet     oxyCODONE (ROXICODONE) 5 MG tablet     prochlorperazine (COMPAZINE) 10 MG tablet     prochlorperazine (COMPAZINE) 10 MG tablet     propranolol (INDERAL) 20 MG tablet     rivaroxaban ANTICOAGULANT (XARELTO) 20 MG TABS tablet     thin (NO BRAND SPECIFIED) lancets     No current facility-administered medications for this visit.     PHYSICAL EXAMINATION ATTAINABLE DURING VIDEO VISIT:  CONSTITUTIONAL - Pt looks like stated age, pleasant, not in acute distress. Not obese.  NEURO: Oriented to time, person, and places. No tremor.  ENT, MOUTH: Pupils are equal.  Sclerae are anicteric.  Moist oral mucosa. No oral thrush.   NECK:  No jugular venous distention.  No thyroid enlargement.   RESPIRATORY: talk nl, no sob during conversation, no cough.   MUSCULOSKELETAL/EXTREMITIES:  No edema.  No joint deformity. Normal range of motion  SKIN:  No petechiae.  No rash.  No signs of cellulitis.   PSYCHIATRIC: Normal mood and affect. Good memory. Proper insight and judgement.   THE REST OF A COMPREHENSIVE PHYSICIAL EXAM IS DEFERRED DUE TO COVID-19 PUBLIC HEALTH EMERGENCY RELATED VIDEO VISIT RESCTRICTION.      Labs & Studies: I personally reviewed the following studies:  Most Recent 3 CBC's:  Recent Labs   Lab Test 02/09/23  1929 01/09/23  1529 12/29/22  1841   WBC 11.2* 9.5 7.8   HGB 16.7 14.1 13.4   MCV 93 92 96    238 249     Most Recent 3 BMP's:  Recent Labs   Lab Test  02/09/23  1929 12/29/22  1841 11/09/22  1104 11/09/22  0731 11/08/22  0813 11/08/22  0618    144  --   --   --  140   POTASSIUM 3.8 3.4 3.4  --    < > 3.2*   CHLORIDE 103 104  --   --   --  103   CO2 25 27  --   --   --  23   BUN 18.0 20.4*  --   --   --  15.1   CR 0.53* 0.70  --   --   --  0.57*   ANIONGAP 14 13  --   --   --  14   TORY 9.5 9.5  --   --   --  8.4*   * 140*  --  158*   < > 147*    < > = values in this interval not displayed.    Most Recent 2 LFT's:  Recent Labs   Lab Test 12/29/22  1841 11/07/22  1531   AST 20 20   ALT 17 23   ALKPHOS 83 79   BILITOTAL 0.6 1.0    Most Recent TSH and T4:  Recent Labs   Lab Test 09/12/22  1642   TSH 2.56        ASSESSMENT AND PLAN:  Stage IV NSCLC, Rt lung adenocarcinoma with metastasis to pleura, mediastinum , rt pleural effusion and brain diagnosed 1/2022 (AJCC 8th edition)  PD-L1 TPS 2-3% by [place]   N[facility] panel-EML4:ALK rearragement; chr2:[id number], chr2:[id number]  NGS Guardant- GNAS R201H, KRAS K5E- No ALK    He began Alectinib 600 mg BID 3/2/22 and unfortunately developed grade 3 myalgias which have improved with lowering the dose 450 mg BID. He was holding drug 4/2/22 to 4/11/22 due to MSSA infection from pleurex which is removed. Resumed at 450 mg BID. Due to ongoing myalgias (Although CK is normal), we dose reduced to 300 mg BID.     In Dec 2022, developed Gr 3 arthralgia, and we stopped drug since 12/20/22. Had unremitting arthalgias, eventully had to starte PO steroids which led to improvement and resolved. Radiographicaly, he has had a near CR to Rx in the lung, has a residual rt LL lesion.     Began brigatinib 2/22/23 (delayed due to pt hesitancy). Developed severe cough on day 2 so brigatinib was held and now after 48 hours has resolved. Will restart at 60 mg daily dose as per package insert; okay'd him to start this tomorrow    Plan  Start brigatinib 60 mg today or tomorrow  Stop over the weekend if cough returns  Oral pharmacy to  check in early next week; if doing well, can push out my follow up   Other appts as scheduled    #cough  2/2 brigatinib. See discussion above.     #right chest/rib pain  This started with his cough and feels msk. We discussed supportive cares and it should improve with resolution of cough.     # Grade 3 arthralgias most likley related to alectinib, unremiittign with tylenol, NSAIDs or xocyodoen,  CK and aldolase has been normal   -All joints affected, no morning stiffness, normal ESRa nd CRP  -recently met with palliative and was started on methadone      # PE: provoked by malignancy vs bevacizumab in 11/2022  -New right-sided pleuritic chest pain, tachycardiam, CT showing rt sided sub segmental PE wo   -- on rivaroxiabn 20 mg daily    # G3 diastolic HTN - led to one dose hold of bevacizumab and now discontinued  Adm with /111. No evidence of end-organ damage. Most likley from Bevacizumab. Recent MRI brain revealed no PRES, stable necrotic/mets areas and no new enhancement.  - HTCZ, lisinopril, and propranolol, mgmt per PCP     # Brain mets:  # Radiation necrosis  Baseline Brain MRI with several brain mets, s/p GK to 11-12 brain mets. F/u Brain MRI in June was showing enlargement of the one of the lesions along with edema, therefore had to undergo craniotomy followed by resection, the final biopsy consistent with radiation necrosis.  Had issues with radiation necrosis and swelling requiring steroids but these were driving up blood sugars drastically  -Began bevacizumab for radiation necrosis --15 mg/kg every 3 weeks, plan for at least 3 months of therapy.  Dexamethasone taper through 10/31. S/p 2 doses of Bevacixumab now, last one 10/26 , but had to hold it given he had HTN urgency and PE.  -Recent Brain MRI showing Rx effect and resolution of radiation necrosis, will have to watch closely given he is off of bevaciuzumab  -Next Brain MRI 4/2023    # Type 2 Diabetes: Self monitoring of blood glucose is not at  goal of fasting  mg/dL. Now off of dexamethasone. Started metformin 500mg ER every day    --Started using [employer] 2 continous glucose monitor  --FOllows medication management-   --on  Metformin and insulin    #grade 3 myalgias: CK has been normal. initially resolved after lowering dose though Connor recalls it has never really improved even with 300 mg BID dose  -off of rosuvastatin now    #normocytic anemia: sudden drop to <7, requiring 1 unit blood transfusion. Lab work up unrevealing. Has recovered to baseline  -consider EGD if hgb drops again, risk for GI bleed with chronic steroid use    #MSSA infection, Sepsis at pleurex site- resolved  # Pleural effusion: s/p pleurex palcement  2/16/22. Then on 4/2 right PleurX catheter s/p removal for severe sepsis due to MSSA infection.      50 minutes spent on the date of the encounter doing chart review, review of test results, interpretation of tests, patient visit, documentation and discussion with other provider(s)     Chelsea Villegas CNP on 3/3/2023 at 1:55 PM

## 2023-03-01 NOTE — PROGRESS NOTES
Video-Visit Details      I was not able to connect with Connor either through [employer] or [employer] today.  The [facility] was able to speak to him very briefly when his phone froze and despite my calling him three times--twice via a land line and the third via the [employer] bravo we did not connect.  I will send him a EDITD message urging him to reschedule ASAP.    Ajith Brewer MD MS FAAFP  [facility]  Office [phone]  Fax [phone]

## 2023-03-02 ENCOUNTER — VIRTUAL VISIT (OUTPATIENT)
Dept: PALLIATIVE CARE | Facility: CLINIC | Age: 43
End: 2023-03-02
Attending: FAMILY MEDICINE
Payer: COMMERCIAL

## 2023-03-02 ENCOUNTER — PATIENT OUTREACH (OUTPATIENT)
Dept: ONCOLOGY | Facility: CLINIC | Age: 43
End: 2023-03-02

## 2023-03-02 DIAGNOSIS — C34.90 NSCLC METASTATIC TO BRAIN (H): ICD-10-CM

## 2023-03-02 DIAGNOSIS — G89.3 CANCER ASSOCIATED PAIN: Primary | ICD-10-CM

## 2023-03-02 DIAGNOSIS — C79.31 NSCLC METASTATIC TO BRAIN (H): ICD-10-CM

## 2023-03-02 DIAGNOSIS — Z51.5 PALLIATIVE CARE PATIENT: ICD-10-CM

## 2023-03-02 PROCEDURE — 99215 OFFICE O/P EST HI 40 MIN: CPT | Mod: 95 | Performed by: FAMILY MEDICINE

## 2023-03-02 PROCEDURE — 99417 PROLNG OP E/M EACH 15 MIN: CPT | Performed by: FAMILY MEDICINE

## 2023-03-02 PROCEDURE — G0463 HOSPITAL OUTPT CLINIC VISIT: HCPCS | Mod: PN,GT | Performed by: FAMILY MEDICINE

## 2023-03-02 RX ORDER — CELECOXIB 100 MG/1
100 CAPSULE ORAL 2 TIMES DAILY
Qty: 60 CAPSULE | Refills: 4 | Status: SHIPPED | OUTPATIENT
Start: 2023-03-02 | End: 2023-06-23

## 2023-03-02 RX ORDER — OXYCODONE HYDROCHLORIDE 5 MG/1
5-10 TABLET ORAL EVERY 4 HOURS PRN
Qty: 60 TABLET | Refills: 0 | Status: SHIPPED | OUTPATIENT
Start: 2023-03-02 | End: 2023-03-17

## 2023-03-02 RX ORDER — METHADONE HYDROCHLORIDE 5 MG/1
2.5 TABLET ORAL EVERY 12 HOURS
Qty: 15 TABLET | Refills: 0 | Status: SHIPPED | OUTPATIENT
Start: 2023-03-02 | End: 2023-04-04

## 2023-03-02 NOTE — NURSING NOTE
Is the patient currently in the state of [place]? YES    Visit mode:VIDEO    If the visit is dropped, the patient can be reconnected by: VIDEO VISIT: Text to cell phone: [phone]    Will anyone else be joining the visit? NO      How would you like to obtain your AVS? MyChart    Are changes needed to the allergy or medication list? NO    Reason for visit: no

## 2023-03-02 NOTE — LETTER
3/2/2023       RE: Connor Emerson  [address]  [facility] [address]     Dear Colleague,    Thank you for [facility] your patient, Connor Emerson[facility]. Please see a copy of my visit note below.    Palliative Care Outpatient Clinic Consultation Note    Patient:  Connor Emerson    Chief Complaint:   Connor Emerson 42 year old male who is presenting to the palliative medicine clinic today at the request of Dr. Persaud for a palliative care consultation secondary to TKI induced pain.   The patient's primary care provider is:  Radha Shetty Ra.     History of Present Illness:  43 yo with ALK mutation NSCLC with brain mets s/p GK treatments.      Per Dr. 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, mediastinum , rt pleural effusion and brain diagnosed 1/2022 (AJCC 8th edition)  PD-L1 TPS 2-3% by [place]   NGS [facility] panel-EML4:ALK rearragement  NGS Guardant- GNAS R201H, KRAS K5E- No ALK     Treatment:   Current:  3/2/22- now- Alectinib 300 mg BID (Dose reduced to 450 mg BID from 600 mg BID due to grade 3 myalgias 3/21/22, again reduced to 300 mg BID 9/28/22)     )  Past:  2/15/22- GK to 11 brain lesions  6/28/22- Craniotomy, resection  9/28/22-10/26- Bevacizumab for radiation necrosis (stopped due to PE)     Intent of treatment: Palliative     Oncologic course:  1/19/22 to 1/22/22-Admitted to [facility] for 2 week progressive SOB secondary to have large rt sided pleural effusion, needing thoracentesis x2 (1.7L and 2.0 L removed), cytology positive for malignancy, adenocarcinoma.   1/26/22- Rt pleural mass biopsy-Dr. Agrawal--POSITIVE FOR ADENOCARCINOMA CONSISTENT WITH LUNG PRIMARY, admixed with mesothelial hyperplasia and inflammatory infiltrate (+ TTF-1 and CK 7;  negative  p40, calretinin and WT-1. PAX8 immunostain focal  +). 4th thoracentesis done simultaneously - 3L approx removed.   2/1/22- PET/CT-Right lower lobe central infiltrative FDG avid 8.2 x 9.6 cm mass representing a primary lung adenocarcinoma. Ipsilateral right perihilar, bilateral pretracheal, subcarinal and superior mediastinal michele metastases. Contralateral mildly FDG avid few lung nodules are suspicious for contralateral metastasis. At least 3 intracranial metastases in the right frontal lobe, left frontal lobe and left cerebellar hemisphere. Nonspecific mild diffuse bone marrow uptake. Further evaluation with a spine MRI could be considered to rule out early marrow infiltration. This could also be seen with red marrow conversion.  2/5/22-  Brain MRI- At least 9 intracranial metastases as detailed above. The dominant lesions involving the orbital right frontal lobe, the posterior left middle frontal gyrus, anterior right temporal lobe and in the left cerebellar hemisphere have surrounding moderate vasogenic type edema.  2/15/22- Saw Dr. Arango from Rad Onc- Rcd GK to 12 lesion in bran  2/16/22- Pleurex placement   3/2/22- Started Alectinib 600 mg BID  3/21/22- Dose reduced to 450 mg BID due to grade 3 myalgias and fatigue  4/2/22 to 4/5/22- Admitted at [facility] for- Severe sepsis due to MSSA infection of right PleurX catheter s/p removal- He presented with onset of pain at tube site starting 4/1; at arrival was tachycardic with leukocytosis (22.7) and elevated lactic acid (2.9).  CT chest showed fluid and stranding tracking outside the pleural space into chest wall along pleural catheter.  IR was consulted and removed catheter 4/2 with report of pustular drainage and tip culture growing MSSA.  Thoracic Surg was consulted who felt no surgical indication necessary given minimal pleural fluid and lack of any signs of abscess.  Initially treated with broad spectrum coverage for sepsis, narrowed to Ancef once sensitivities returned with plan to transition to  cefadroxil for an additional 10 days at discharge per ID. Held drug 4/2 to 4/11 5/2/22- CT CAP- Overall, positive response to therapy with decreased size of right lower lobe and right pleural-based masses, pulmonary metastases, hilar and mediastinal lymphadenopathy. However, a single right posterior pleural-based mass has slightly increased in size since 2/24/2022. No metastatic disease in the abdomen and pelvis. Right Pleurx catheter has been removed. Trace right pleural effusion and right basilar atelectasis.  5/2/22- Brain MRI- The previously demonstrated brain metastases are mildly diminished in size versus to 2/5/2022. The degree of edema is also diminished but not completely resolved. Probable trace amounts of intralesional bleeding demonstrated on the gradient sequence within the metastases. No definite new metastasis or progressive mass effect. No hydrocephalus or infarct.     6/15/22 to 6/17/22- Admitted at [facility]-with aphasia and word finding difficulty over last few weeks.  He presented to [facility] ED on 6/10 for evaluation of his symptoms. MRI brain showed multiple intracranial metastases, with interval enlargement of the dominant lesion within the left frontal lobe and increased surrounding vasogenic edema with 2 mm rightward shift of the septum pellucidum. Due to his worsening anxiety, he left AMA. His symptoms continued to progress to where he could not write at work so he decided to go to the ED for re-evaluation and treatment. Evaluated by NSGY, Rad Onc (radiaiton necrosis vs tumor progression).  6/16/22- MR Brain (6/16) shows multiple intracranial metastases, with interval enlargement  of the dominant lesion within the left frontal lobe and increased surrounding vasogenic edema with 2 mm rightward shift of the septum pellucidum.  6/16/22- - CT CAP shows slightly decreased size of right lower lobe and right pleural-based masses. No new pulmonary nodules or lymphadenopathy; No evidence of metastatic  disease in the abdomen or pelvis.   6/28/22 to 6/30/22- Admitted at [facility]- Elective left Stealth craniotomy with resection of brain tumor due to ongoing symptoms. No intraoperative complications. EBL 50 ml.  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  7/5/22- Ct CAP- Right lower lobe low-density nodules are not significantly changed. A small left upper lobe pulmonary nodule is also unchanged. Trace pleural fluid on the right has increased slightly. No convincing evidence for metastatic disease in the abdomen or pelvis.  7/18/22 to 7/19/22- Admitted to [facility] for seizure- Reportedly was only taking once Keppra instead of twice daily. Also resumed on dexamethasone 2 mg daily  8/1/22- Brain MRI- Redemonstrated postsurgical changes status post left frontoparietal Craniotomy. Interval increase in size of the dominant ring-enhancing lesion in the left posterior superior frontal lobe with increased moderate surrounding vasogenic edema and local mass effect resulting in narrowing of the supratentorial ventricular system. No significant midline shift/herniation at this time  8/1/22- Dex was increased to 4 mg daily by Dr. Moran  9/1/22- CT Chest- Near resolution of previously seen right pleural nodule. Stable right lower lobe pulmonary nodule  9/28/22- Bevacizumab for radiation necrosis  10/26/22- Bevacizumab   10/26/22- Ct CAP- Stable posterior medial right lower lobe 1.9 x 1.1 cm nodule series 8 image 176. Adjacent stable scarring and atelectasis. The previously noted pleural nodule posteriorly on the right is not currently clearly identified. Stable left upper lobe 0.3 cm nodule image 56  10/26/22- Brain MRI- Overall improved appearance of multiple intracranial metastases with near resolution of associated edema and diminished enhancement and size of multiple residual lesions. No definite new or progressive metastasis.  11/7/22 to 11/9/22- Admitted for PE and HTN urgency- Small pulmonary embolism in the right lower  lobe pulmonary artery. started on Lovenox, Brain MRI neg for PRES.  12/29/22- ED visit- bilateral hip pain, pain in shoulders, knuckles, knees, and ankles- holding alectinib since 12/20/22 1/6/23- Ct CAP- Mild groundglass nodularity in the left upper lobe is new since the previous exam, and may be infectious in etiology. No other significant interval change. Pulmonary nodules are not significantly changed.  1/6/23- Brain MRI- Stable to diminished sequelae of intracranial metastasis and treatment changes. No new or progressive metastasis. No superimposed acute intracranial finding.        Distressing Symptom/s:pain related to TKI and feels overall stiff.  Uses 5 mg Oxycodone 2 tabs twice a day to get through the day.  No drowsiness. No constipation.  It helps 'sometimes'  Steroids helped a lot last time.  He was started on prednisone a few weeks ago due to trouble breathing and while he was on the steroids his pain was about 50% better.      Patient's Disease Understanding: good; understands the treatments are palliative in nature and he hopes with some more time additional treatments may become available.    Coping:pretty well though he worries his life will be shorter than he wants and he is worried about having bad side effects from his brigatanib.    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; he likes the freedom to do what needs to be done; no one is 'micromanaging' him; wife  is a stay at home mom--daughter has Beetles-Barley syndrome and she is applying to be a PCA  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'  Substance Use/History of misuse: brother had alcohol and drug use problem and has been sober for 7 years;  Financial Concerns: no; his dad is prepared to help if needed  Spiritual Background: grew up celebrating Religious Holy days;   Spiritual Concerns/Needs: none      Social History     Tobacco Use     Smoking status: Never     Smokeless tobacco: Never   Vaping Use     Vaping Use: Never used   Substance Use Topics     Alcohol use: Yes     Alcohol/week: 0.0 standard drinks     Comment: social     Drug use: No       Family History  Family History   Problem Relation Age of Onset     Unknown/Adopted Mother      Uterine Cancer Mother      Unknown/Adopted Father      Unknown/Adopted Maternal Grandmother      Unknown/Adopted Maternal Grandfather      Stomach Cancer Maternal Grandfather      Unknown/Adopted Paternal Grandmother      Unknown/Adopted Paternal Grandfather      Melanoma Maternal Uncle        Advance Care Planning:  Advance Directive:   None completed, directed to [employer] website  Where is written copy located: n/a  Health Care Agent Contact Information: would be wife  POLST:   n/a    Allergies   Allergen Reactions     Vicodin [Hydrocodone-Acetaminophen] Nausea and Vomiting and GI Disturbance     Current Outpatient Medications   Medication Sig Dispense Refill     albuterol (PROAIR HFA/PROVENTIL HFA/VENTOLIN HFA) 108 (90 Base) MCG/ACT inhaler Inhale 2 puffs into the lungs every 6 hours as needed for shortness of breath, wheezing or cough 18 g 0     alcohol swab prep pads Use to swab area of injection/jabier as directed. 100 each 3     blood glucose (NO BRAND SPECIFIED) test strip Use to test blood sugar 2 times daily or as directed. To accompany: Blood Glucose Monitor Brands: per insurance. 100 strip 6     blood glucose calibration (NO BRAND SPECIFIED) solution To accompany:  Blood Glucose Monitor Brands: per insurance. 1 each 0     blood glucose monitoring (NO BRAND SPECIFIED) meter device kit Use to test blood sugar 2 times daily or as directed. Preferred blood glucose meter OR supplies to accompany: Blood Glucose Monitor Brands: per insurance. 1 kit 0     brigatinib (ALUNBRIG) 30 MG TABS tablet Take 3 tablets (90 mg) by mouth once daily for 2 weeks, then take 4 tablets (120 mg) by mouth once daily for 1 week, then 5 tablets (150 mg) by mouth once daily for 1 week 105 tablet 0     citalopram (CELEXA) 20 MG tablet Take 1 tablet (20 mg) by mouth every evening 90 tablet 1     Continuous Blood Gluc Sensor (FREESTYLE IRENE 2 SENSOR) MISC 1 each every 14 days Apply as directed per  instructions (Patient not taking: Reported on 11/22/2022) 2 each 11     hydrochlorothiazide (HYDRODIURIL) 25 MG tablet Take 1 tablet (25 mg) by mouth daily 30 tablet 3     insulin aspart (NOVOLOG PEN) 100 UNIT/ML pen Inject 2 Units Subcutaneous 3 times daily (before meals) Plus adjustment scale 1:20 for blood sugars >150mg/dL max daily 50 units 45 mL 2     insulin glargine (LANTUS PEN) 100 UNIT/ML pen Inject 10-20 Units Subcutaneous daily On hold 11/22. Increase as directed, Max daily dose 100 units 30 mL 2     insulin pen needle (31G X 8 MM) 31G X 8 MM miscellaneous Use one pen needles daily or as directed. 100 each 0     levETIRAcetam (KEPPRA) 1000 MG tablet Take 1 tablet (1,000 mg) by mouth 2 times daily 60 tablet 3     lisinopril (ZESTRIL) 40 MG tablet Take 1 tablet (40 mg) by mouth daily 30 tablet 2     metFORMIN (GLUCOPHAGE XR) 500 MG 24 hr tablet Week 1: Take one of the 500mg tablet once daily with food; Week 2: increase to 2 of the 500mg (1000mg) tablets per day; Week 3: increase to 3 of the 500mg (1500mg) tablets per day 90 tablet 2     methocarbamol (ROBAXIN) 500 MG tablet Take 1 tablet (500 mg) by mouth 4 times daily (Patient not taking: Reported on 1/9/2023) 30 tablet 0     oxyCODONE  (ROXICODONE) 5 MG tablet Take 1 tablet (5 mg) by mouth every 4 hours as needed for moderate to severe pain 60 tablet 0     prochlorperazine (COMPAZINE) 10 MG tablet Take 1 tablet (10 mg) by mouth every 6 hours as needed for nausea or vomiting 30 tablet 2     prochlorperazine (COMPAZINE) 10 MG tablet Take 1 tablet (10 mg) by mouth every 6 hours as needed (Nausea/Vomiting) 30 tablet 2     propranolol (INDERAL) 20 MG tablet Take 1 tablet (20 mg) by mouth 2 times daily 60 tablet 5     rivaroxaban ANTICOAGULANT (XARELTO) 20 MG TABS tablet Take 1 tablet (20 mg) by mouth daily (with dinner) (Patient taking differently: Take 20 mg by mouth daily (with dinner) Per Pt he is still using this Meds. 01/25/2023) 90 tablet 3     thin (NO BRAND SPECIFIED) lancets Use with lanceting device. To accompany: Blood Glucose Monitor Brands: per insurance. 100 each 6     Past Medical History:   Diagnosis Date     Atypical chest pain 12/02/2013     Cancer (H)      Complication of anesthesia      Diabetes (H)      GERD (gastroesophageal reflux disease) 12/02/2013     HTN (hypertension) 05/14/2012     HTN, goal below 140/90 07/02/2013     Insomnia 02/21/2012     Mediastinal lymphadenopathy      Migraine headache 07/02/2013     Migraine with aura, without mention of intractable migraine without mention of status migrainosus      Pneumonia      Past Surgical History:   Procedure Laterality Date     BRONCHOSCOPY RIGID OR FLEXIBLE W/TRANSENDOSCOPIC ENDOBRONCHIAL ULTRASOUND GUIDED Bilateral 1/26/2022    Procedure: Right BRONCHOSCOPY, FIBEROPTIC, endobronchial ultrasound, pleural biopsy;  Surgeon: Dallin Agrawal MD;  Location: [facility] OR     INJECT BLOCK MEDIAL BRANCH CERVICAL/THORACIC/LUMBAR       INSERT CHEST TUBE Right 2/16/2022    Procedure: INSERTION, CATHETER, INTERCOSTAL, FOR DRAINAGE;  Surgeon: Dallin Agrawal MD;  Location: [facility] GI     INSERT CHEST TUBE Right 3/9/2022    Procedure: INSERTION, CATHETER, INTERCOSTAL, FOR DRAINAGE;  Surgeon: Paco  MD Sushila;  Location:  GI     IR CHEST TUBE REMOVAL TUNNELED RIGHT  2022     OPTICAL TRACKING SYSTEM CRANIOTOMY, EXCISE TUMOR, COMBINED Left 2022    Procedure: Left stealth craniotomy for tumor resection with motor mapping;  Surgeon: Stephen Moran MD;  Location:  OR     ORTHOPEDIC SURGERY      Ganesh. Rotator cuff repair.     PLEUROSCOPY N/A 2022    Procedure: Pleuroscopy with Pleural Biopsy;  Surgeon: Dallin Agrawal MD;  Location:  OR       REVIEW OF SYSTEMS:   ROS: 10 point ROS neg other than the symptoms noted above in the HPI and here:  Palliative Symptom Review (0=no symptom/no concern, 1=mild, 2=moderate, 3=severe):      Pain:2-3      Fatigue: 1      Nausea: 0      Constipation: 0      Diarrhea: 0      Depressive Symptoms: 0      Anxiety: 0      Drowsiness: 1      Poor Appetite: 0      Shortness of Breath: 1-2--more of a cough      Insomnia: 0      Other: 0      Overall (0 good/no concerns, 3 very poor):  1-2      GENERAL APPEARANCE: healthy, alert and no distress; neatly groomed  EYES: Eyes grossly normal to inspection, PERRLA, conjunctivae and sclerae without injection or discharge, EOM intact   RESP:  no increased work of breathing; speaks in complete sentences;   MS: No musculoskeletal defects are noted  SKIN: No suspicious lesions or rashes, hydration status appears adequate with normal skin turgor   PSYCH: Alert and oriented x3; speech- coherent , normal rate and volume; able to articulate logical thoughts, able to abstract reason, no tangential thoughts, no hallucinations or delusions, mentation appears normal, Mood is euthymic. Affect is appropriate for this mood state and bright. Thought content is free of suicidal ideation, hallucinations, and delusions.  Eye contact is good during conversation.     Data Reviewed:  LABS: 2023 Cr 0.53; Hgb 16.7; 2022 alb 4.5  IMAGIN2023 CT CAP W/CONTRAST  FINDINGS:   LUNGS AND PLEURA: Trace amount of pleural fluid on  the right,  unchanged. Pulmonary nodule in the right lower lobe posteriorly and  medially (series 8 image 188) is unchanged, measuring 2 x 1.2 cm.  Scattered scarring and/or linear atelectasis at the right lung base.  There is mild ground-glass nodularity in the left upper lobe, new  since the previous exam. A 0.4 cm left upper lobe pulmonary nodule  (series 8 image 67) is unchanged.    2023 BRAIN MR W/O & W/CONTRAST  FINDINGS: Craniotomy overlying the left frontal lobe. Irregular  enhancement in the underlying brain parenchyma is mildly diminished.  Faint enhancement associated with the previously described bifrontal  lesions unchanged. No new enhancing intracranial lesion. FLAIR and  diffusion hyperintense lesions within the right temporal lobe and  lateral left cerebellum again noted. The more lateral left cerebellar  lesion on the prior exam is diminished in conspicuity. The other  lesions are stable. None of them appear progressed. These lesions are  associated with signal loss on the gradient images consistent with  mineralization or hemosiderin deposition. Similar changes around the  right frontal lesion, left operative cavity with a few punctate foci  in the bilateral parietal regions. These were all present previously.  Minor mucosal thickening in the sinuses. Mastoid air cells appear free  from significant disease. Intraorbital contents are unremarkable.  Ventricles are within normal limits in size for the patient's age.  Intracranial flow voids are intact. There is no mass effect, midline  shift, or extraaxial collection.    EC2023 QTc 464 msec     Impressions:  ECO  Decision Making Capacity:  Very present  PDMP review:  Yes, no concerns    Metastatic NSCLC with ALK rearrangement  S/p GK to brain mets and craniotomy for excision  Cancer associated pain    Goals of Care:  Connor wants to continue cancer-directed therapies as long as the side effects are tolerable.  He is a FULL code  should he have a cardiac arrest. He is OK being cared for in the acute care hospital if needed    Recommendations & Counseling:  Start Methadone 2.5 mg po BID  Continue oxycodone 5-10 mg po q 4 hours prn; refill sent  Narcan nasal spray also sent to pharmacy  Start Celecoxib 100 mg po BID--hold for stomach ache--Connor is not currently on an anticoagulant  Check ECG in 2 weeks to monitor QTc while initiating methadone  F/up in 4 weeks and nurse will call for symptom check in a week.    Counseling: All of the above was explained to the patient in lay language. The patient has verbalized a clear understanding of the discussion, asked appropriate questions, which have been answered to patient's apparent satisfaction. The patient is in agreement with the above plan.    73 minutes were spent on the date of the encounter doing chart review, history and exam, documentation and further activities as noted above.    Ajith Brewer MD MS FAAFP  [facility]  Office [phone]  Fax [phone]

## 2023-03-02 NOTE — PATIENT INSTRUCTIONS
It was good to meet you today, Connor.    Here are the things we talked about:  Continue the oxycodone as needed for breakthrough pain  Start the methadone 2.5 mg by mouth (1/2 tablet) twice a day  Please call if you get constipated  Start Celebrex 100 mg by mouth twice a day--stop it if you get a stomach ache  Get an EKG done in two weeks either at Dr. Persaud's office or Radha Shetty's to monitor the methadone's effect on your heart    Don't forget to  your inhaler from the pharmacy.    Please got to the [employer] [place] website and download an advanced care plan document.  We recommend anyone over 40 complete one (they're a fancy living will). I think it is a good idea for people with serious illnesses to complete them.  Once it is done you can bring it to any upcoming appointment and the team can help you get it uploaded into the electronic medical record.    Someone from the team will reach out to schedule a follow up appointment in 4 weeks.    How to get a hold of us:  For non-urgent matters, MyChart works best.    For more urgent matters, or if you prefer not to use MyChart, call our clinic nurse coordinator Clover TEAGUE or Pat HUANG at [phone].    We have an on-call number for evenings and weekends. Please call this only if you are having uncontrolled symptoms or serious side effects from your medicines: [phone].     For refills, please give us a week (5 working days) notice. We don't always have providers available everyday to do refills. If you call the day you run out of your medicine, we may not be able to refill it in time, so call 5 days in advance!    Ajith Brewer MD MS FAAFP  [facility]  Office [phone]  Fax [phone]

## 2023-03-02 NOTE — PROGRESS NOTES
Video-Visit Details    Type of service:  Video Visit    Video Start Time (time video started): 1440    Video End Time (time video stopped): 1545    Originating Location (pt. Location): Other at work in [place]        Distant Location (provider location):  On-site    Mode of Communication:  Video Conference via [facility]      Palliative Care Outpatient Clinic Consultation Note    Patient:  Connor Emerson    Chief Complaint:   Connor Emerson 42 year old male who is presenting to the palliative medicine clinic today at the request of Dr. Persaud for a palliative care consultation secondary to TKI induced pain.   The patient's primary care provider is:  Radha Shetty Ra.     History of Present Illness:  43 yo with ALK mutation NSCLC with brain mets s/p GK treatments.      Per Dr. 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, mediastinum , rt pleural effusion and brain diagnosed 1/2022 (AJCC 8th edition)  PD-L1 TPS 2-3% by [place]   NGS [facility] panel-EML4:ALK rearragement  NGS [facility]- GNAS R201H, KRAS K5E- No ALK     Treatment:   Current:  3/2/22- now- Alectinib 300 mg BID (Dose reduced to 450 mg BID from 600 mg BID due to grade 3 myalgias 3/21/22, again reduced to 300 mg BID 9/28/22)     )  Past:  2/15/22- GK to 11 brain lesions  6/28/22- Craniotomy, resection  9/28/22-10/26- Bevacizumab for radiation necrosis (stopped due to PE)     Intent of treatment: Palliative     Oncologic course:  1/19/22 to 1/22/22-Admitted to [facility] for 2 week progressive SOB secondary to have large rt sided pleural effusion, needing thoracentesis x2 (1.7L and 2.0 L removed), cytology positive for malignancy, adenocarcinoma.   1/26/22- Rt pleural mass biopsy-Dr. Agrawal--POSITIVE FOR ADENOCARCINOMA CONSISTENT WITH LUNG PRIMARY, admixed with mesothelial hyperplasia and inflammatory infiltrate (+ TTF-1 and CK 7;  negative  p40, calretinin and WT-1.  PAX8 immunostain focal +). 4th thoracentesis done simultaneously - 3L approx removed.   2/1/22- PET/CT-Right lower lobe central infiltrative FDG avid 8.2 x 9.6 cm mass representing a primary lung adenocarcinoma. Ipsilateral right perihilar, bilateral pretracheal, subcarinal and superior mediastinal michele metastases. Contralateral mildly FDG avid few lung nodules are suspicious for contralateral metastasis. At least 3 intracranial metastases in the right frontal lobe, left frontal lobe and left cerebellar hemisphere. Nonspecific mild diffuse bone marrow uptake. Further evaluation with a spine MRI could be considered to rule out early marrow infiltration. This could also be seen with red marrow conversion.  2/5/22-  Brain MRI- At least 9 intracranial metastases as detailed above. The dominant lesions involving the orbital right frontal lobe, the posterior left middle frontal gyrus, anterior right temporal lobe and in the left cerebellar hemisphere have surrounding moderate vasogenic type edema.  2/15/22- Saw Dr. Arango from Rad Onc- Rcd GK to 12 lesion in bran  2/16/22- Pleurex placement   3/2/22- Started Alectinib 600 mg BID  3/21/22- Dose reduced to 450 mg BID due to grade 3 myalgias and fatigue  4/2/22 to 4/5/22- Admitted at [facility] for- Severe sepsis due to MSSA infection of right PleurX catheter s/p removal- He presented with onset of pain at tube site starting 4/1; at arrival was tachycardic with leukocytosis (22.7) and elevated lactic acid (2.9).  CT chest showed fluid and stranding tracking outside the pleural space into chest wall along pleural catheter.  IR was consulted and removed catheter 4/2 with report of pustular drainage and tip culture growing MSSA.  Thoracic Surg was consulted who felt no surgical indication necessary given minimal pleural fluid and lack of any signs of abscess.  Initially treated with broad spectrum coverage for sepsis, narrowed to Ancef once sensitivities returned with plan to  transition to cefadroxil for an additional 10 days at discharge per ID. Held drug 4/2 to 4/11 5/2/22- CT CAP- Overall, positive response to therapy with decreased size of right lower lobe and right pleural-based masses, pulmonary metastases, hilar and mediastinal lymphadenopathy. However, a single right posterior pleural-based mass has slightly increased in size since 2/24/2022. No metastatic disease in the abdomen and pelvis. Right Pleurx catheter has been removed. Trace right pleural effusion and right basilar atelectasis.  5/2/22- Brain MRI- The previously demonstrated brain metastases are mildly diminished in size versus to 2/5/2022. The degree of edema is also diminished but not completely resolved. Probable trace amounts of intralesional bleeding demonstrated on the gradient sequence within the metastases. No definite new metastasis or progressive mass effect. No hydrocephalus or infarct.     6/15/22 to 6/17/22- Admitted at [facility]-with aphasia and word finding difficulty over last few weeks.  He presented to [facility] ED on 6/10 for evaluation of his symptoms. MRI brain showed multiple intracranial metastases, with interval enlargement of the dominant lesion within the left frontal lobe and increased surrounding vasogenic edema with 2 mm rightward shift of the septum pellucidum. Due to his worsening anxiety, he left AMA. His symptoms continued to progress to where he could not write at work so he decided to go to the ED for re-evaluation and treatment. Evaluated by NSGY, Rad Onc (radiaiton necrosis vs tumor progression).  6/16/22- MR Brain (6/16) shows multiple intracranial metastases, with interval enlargement  of the dominant lesion within the left frontal lobe and increased surrounding vasogenic edema with 2 mm rightward shift of the septum pellucidum.  6/16/22- - CT CAP shows slightly decreased size of right lower lobe and right pleural-based masses. No new pulmonary nodules or lymphadenopathy; No evidence of  metastatic disease in the abdomen or pelvis.   6/28/22 to 6/30/22- Admitted at [facility]- Elective left Stealth craniotomy with resection of brain tumor due to ongoing symptoms. No intraoperative complications. EBL 50 ml.  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  7/5/22- Ct CAP- Right lower lobe low-density nodules are not significantly changed. A small left upper lobe pulmonary nodule is also unchanged. Trace pleural fluid on the right has increased slightly. No convincing evidence for metastatic disease in the abdomen or pelvis.  7/18/22 to 7/19/22- Admitted to [facility] for seizure- Reportedly was only taking once Keppra instead of twice daily. Also resumed on dexamethasone 2 mg daily  8/1/22- Brain MRI- Redemonstrated postsurgical changes status post left frontoparietal Craniotomy. Interval increase in size of the dominant ring-enhancing lesion in the left posterior superior frontal lobe with increased moderate surrounding vasogenic edema and local mass effect resulting in narrowing of the supratentorial ventricular system. No significant midline shift/herniation at this time  8/1/22- Dex was increased to 4 mg daily by Dr. Moran  9/1/22- CT Chest- Near resolution of previously seen right pleural nodule. Stable right lower lobe pulmonary nodule  9/28/22- Bevacizumab for radiation necrosis  10/26/22- Bevacizumab   10/26/22- Ct CAP- Stable posterior medial right lower lobe 1.9 x 1.1 cm nodule series 8 image 176. Adjacent stable scarring and atelectasis. The previously noted pleural nodule posteriorly on the right is not currently clearly identified. Stable left upper lobe 0.3 cm nodule image 56  10/26/22- Brain MRI- Overall improved appearance of multiple intracranial metastases with near resolution of associated edema and diminished enhancement and size of multiple residual lesions. No definite new or progressive metastasis.  11/7/22 to 11/9/22- Admitted for PE and HTN urgency- Small pulmonary embolism in the  right lower lobe pulmonary artery. started on Lovenox, Brain MRI neg for PRES.  12/29/22- ED visit- bilateral hip pain, pain in shoulders, knuckles, knees, and ankles- holding alectinib since 12/20/22 1/6/23- Ct CAP- Mild groundglass nodularity in the left upper lobe is new since the previous exam, and may be infectious in etiology. No other significant interval change. Pulmonary nodules are not significantly changed.  1/6/23- Brain MRI- Stable to diminished sequelae of intracranial metastasis and treatment changes. No new or progressive metastasis. No superimposed acute intracranial finding.        Distressing Symptom/s:pain related to TKI and feels overall stiff.  Uses 5 mg Oxycodone 2 tabs twice a day to get through the day.  No drowsiness. No constipation.  It helps 'sometimes'  Steroids helped a lot last time.  He was started on prednisone a few weeks ago due to trouble breathing and while he was on the steroids his pain was about 50% better.      Patient's Disease Understanding: good; understands the treatments are palliative in nature and he hopes with some more time additional treatments may become available.    Coping:pretty well though he worries his life will be shorter than he wants and he is worried about having bad side effects from his brigatanib.    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; he likes the freedom to do what needs to be done; no one is  'micromanaging' him; wife is a stay at home mom--daughter has Beetles-Barley syndrome and she is applying to be a PCA  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'  Substance Use/History of misuse: brother had alcohol and drug use problem and has been sober for 7 years;  Financial Concerns: no; his dad is prepared to help if needed  Spiritual Background: grew up celebrating [employer] Holy days;   Spiritual Concerns/Needs: none      Social History     Tobacco Use     Smoking status: Never     Smokeless tobacco: Never   Vaping Use     Vaping Use: Never used   Substance Use Topics     Alcohol use: Yes     Alcohol/week: 0.0 standard drinks     Comment: social     Drug use: No       Family History  Family History   Problem Relation Age of Onset     Unknown/Adopted Mother      Uterine Cancer Mother      Unknown/Adopted Father      Unknown/Adopted Maternal Grandmother      Unknown/Adopted Maternal Grandfather      Stomach Cancer Maternal Grandfather      Unknown/Adopted Paternal Grandmother      Unknown/Adopted Paternal Grandfather      Melanoma Maternal Uncle        Advance Care Planning:  Advance Directive:   None completed, directed to [employer] website  Where is written copy located: n/a  Health Care Agent Contact Information: would be wife  POLST:   n/a    Allergies   Allergen Reactions     Vicodin [Hydrocodone-Acetaminophen] Nausea and Vomiting and GI Disturbance     Current Outpatient Medications   Medication Sig Dispense Refill     albuterol (PROAIR HFA/PROVENTIL HFA/VENTOLIN HFA) 108 (90 Base) MCG/ACT inhaler Inhale 2 puffs into the lungs every 6 hours as needed for shortness of breath, wheezing or cough 18 g 0     alcohol swab prep pads Use to swab area of injection/jabier as directed. 100 each 3     blood glucose (NO BRAND SPECIFIED) test strip Use to test blood sugar 2 times daily or as directed. To accompany: Blood Glucose Monitor Brands: per insurance. 100 strip 6     blood glucose calibration (NO BRAND  SPECIFIED) solution To accompany: Blood Glucose Monitor Brands: per insurance. 1 each 0     blood glucose monitoring (NO BRAND SPECIFIED) meter device kit Use to test blood sugar 2 times daily or as directed. Preferred blood glucose meter OR supplies to accompany: Blood Glucose Monitor Brands: per insurance. 1 kit 0     brigatinib (ALUNBRIG) 30 MG TABS tablet Take 3 tablets (90 mg) by mouth once daily for 2 weeks, then take 4 tablets (120 mg) by mouth once daily for 1 week, then 5 tablets (150 mg) by mouth once daily for 1 week 105 tablet 0     citalopram (CELEXA) 20 MG tablet Take 1 tablet (20 mg) by mouth every evening 90 tablet 1     Continuous Blood Gluc Sensor (FREESTYLE IRENE 2 SENSOR) MISC 1 each every 14 days Apply as directed per  instructions (Patient not taking: Reported on 11/22/2022) 2 each 11     hydrochlorothiazide (HYDRODIURIL) 25 MG tablet Take 1 tablet (25 mg) by mouth daily 30 tablet 3     insulin aspart (NOVOLOG PEN) 100 UNIT/ML pen Inject 2 Units Subcutaneous 3 times daily (before meals) Plus adjustment scale 1:20 for blood sugars >150mg/dL max daily 50 units 45 mL 2     insulin glargine (LANTUS PEN) 100 UNIT/ML pen Inject 10-20 Units Subcutaneous daily On hold 11/22. Increase as directed, Max daily dose 100 units 30 mL 2     insulin pen needle (31G X 8 MM) 31G X 8 MM miscellaneous Use one pen needles daily or as directed. 100 each 0     levETIRAcetam (KEPPRA) 1000 MG tablet Take 1 tablet (1,000 mg) by mouth 2 times daily 60 tablet 3     lisinopril (ZESTRIL) 40 MG tablet Take 1 tablet (40 mg) by mouth daily 30 tablet 2     metFORMIN (GLUCOPHAGE XR) 500 MG 24 hr tablet Week 1: Take one of the 500mg tablet once daily with food; Week 2: increase to 2 of the 500mg (1000mg) tablets per day; Week 3: increase to 3 of the 500mg (1500mg) tablets per day 90 tablet 2     methocarbamol (ROBAXIN) 500 MG tablet Take 1 tablet (500 mg) by mouth 4 times daily (Patient not taking: Reported on  1/9/2023) 30 tablet 0     oxyCODONE (ROXICODONE) 5 MG tablet Take 1 tablet (5 mg) by mouth every 4 hours as needed for moderate to severe pain 60 tablet 0     prochlorperazine (COMPAZINE) 10 MG tablet Take 1 tablet (10 mg) by mouth every 6 hours as needed for nausea or vomiting 30 tablet 2     prochlorperazine (COMPAZINE) 10 MG tablet Take 1 tablet (10 mg) by mouth every 6 hours as needed (Nausea/Vomiting) 30 tablet 2     propranolol (INDERAL) 20 MG tablet Take 1 tablet (20 mg) by mouth 2 times daily 60 tablet 5     rivaroxaban ANTICOAGULANT (XARELTO) 20 MG TABS tablet Take 1 tablet (20 mg) by mouth daily (with dinner) (Patient taking differently: Take 20 mg by mouth daily (with dinner) Per Pt he is still using this Meds. 01/25/2023) 90 tablet 3     thin (NO BRAND SPECIFIED) lancets Use with lanceting device. To accompany: Blood Glucose Monitor Brands: per insurance. 100 each 6     Past Medical History:   Diagnosis Date     Atypical chest pain 12/02/2013     Cancer (H)      Complication of anesthesia      Diabetes (H)      GERD (gastroesophageal reflux disease) 12/02/2013     HTN (hypertension) 05/14/2012     HTN, goal below 140/90 07/02/2013     Insomnia 02/21/2012     Mediastinal lymphadenopathy      Migraine headache 07/02/2013     Migraine with aura, without mention of intractable migraine without mention of status migrainosus      Pneumonia      Past Surgical History:   Procedure Laterality Date     BRONCHOSCOPY RIGID OR FLEXIBLE W/TRANSENDOSCOPIC ENDOBRONCHIAL ULTRASOUND GUIDED Bilateral 1/26/2022    Procedure: Right BRONCHOSCOPY, FIBEROPTIC, endobronchial ultrasound, pleural biopsy;  Surgeon: Dallin Agrawal MD;  Location: [facility] OR     INJECT BLOCK MEDIAL BRANCH CERVICAL/THORACIC/LUMBAR       INSERT CHEST TUBE Right 2/16/2022    Procedure: INSERTION, CATHETER, INTERCOSTAL, FOR DRAINAGE;  Surgeon: Dallin Agrawal MD;  Location: [facility] GI     INSERT CHEST TUBE Right 3/9/2022    Procedure: INSERTION, CATHETER,  INTERCOSTAL, FOR DRAINAGE;  Surgeon: Sushila Antonio MD;  Location: [facility] GI     IR CHEST TUBE REMOVAL TUNNELED RIGHT  2022     OPTICAL TRACKING SYSTEM CRANIOTOMY, EXCISE TUMOR, COMBINED Left 2022    Procedure: Left stealth craniotomy for tumor resection with motor mapping;  Surgeon: Stephen Moran MD;  Location:  OR     ORTHOPEDIC SURGERY      Ganesh. Rotator cuff repair.     PLEUROSCOPY N/A 2022    Procedure: Pleuroscopy with Pleural Biopsy;  Surgeon: Dallin Agrawal MD;  Location: [facility] OR       REVIEW OF SYSTEMS:   ROS: 10 point ROS neg other than the symptoms noted above in the HPI and here:  Palliative Symptom Review (0=no symptom/no concern, 1=mild, 2=moderate, 3=severe):      Pain:2-3      Fatigue: 1      Nausea: 0      Constipation: 0      Diarrhea: 0      Depressive Symptoms: 0      Anxiety: 0      Drowsiness: 1      Poor Appetite: 0      Shortness of Breath: 1-2--more of a cough      Insomnia: 0      Other: 0      Overall (0 good/no concerns, 3 very poor):  1-2      GENERAL APPEARANCE: healthy, alert and no distress; neatly groomed  EYES: Eyes grossly normal to inspection, PERRLA, conjunctivae and sclerae without injection or discharge, EOM intact   RESP:  no increased work of breathing; speaks in complete sentences;   MS: No musculoskeletal defects are noted  SKIN: No suspicious lesions or rashes, hydration status appears adequate with normal skin turgor   PSYCH: Alert and oriented x3; speech- coherent , normal rate and volume; able to articulate logical thoughts, able to abstract reason, no tangential thoughts, no hallucinations or delusions, mentation appears normal, Mood is euthymic. Affect is appropriate for this mood state and bright. Thought content is free of suicidal ideation, hallucinations, and delusions.  Eye contact is good during conversation.     Data Reviewed:  LABS: 2023 Cr 0.53; Hgb 16.7; 2022 alb 4.5  IMAGIN2023 CT CAP W/CONTRAST  FINDINGS:   LUNGS AND  PLEURA: Trace amount of pleural fluid on the right,  unchanged. Pulmonary nodule in the right lower lobe posteriorly and  medially (series 8 image 188) is unchanged, measuring 2 x 1.2 cm.  Scattered scarring and/or linear atelectasis at the right lung base.  There is mild ground-glass nodularity in the left upper lobe, new  since the previous exam. A 0.4 cm left upper lobe pulmonary nodule  (series 8 image 67) is unchanged.    2023 BRAIN MR W/O & W/CONTRAST  FINDINGS: Craniotomy overlying the left frontal lobe. Irregular  enhancement in the underlying brain parenchyma is mildly diminished.  Faint enhancement associated with the previously described bifrontal  lesions unchanged. No new enhancing intracranial lesion. FLAIR and  diffusion hyperintense lesions within the right temporal lobe and  lateral left cerebellum again noted. The more lateral left cerebellar  lesion on the prior exam is diminished in conspicuity. The other  lesions are stable. None of them appear progressed. These lesions are  associated with signal loss on the gradient images consistent with  mineralization or hemosiderin deposition. Similar changes around the  right frontal lesion, left operative cavity with a few punctate foci  in the bilateral parietal regions. These were all present previously.  Minor mucosal thickening in the sinuses. Mastoid air cells appear free  from significant disease. Intraorbital contents are unremarkable.  Ventricles are within normal limits in size for the patient's age.  Intracranial flow voids are intact. There is no mass effect, midline  shift, or extraaxial collection.    EC2023 QTc 464 msec     Impressions:  ECO  Decision Making Capacity:  Very present  PDMP review:  Yes, no concerns    Metastatic NSCLC with ALK rearrangement  S/p GK to brain mets and craniotomy for excision  Cancer associated pain    Goals of Care:  Connor wants to continue cancer-directed therapies as long as the side  effects are tolerable.  He is a FULL code should he have a cardiac arrest. He is OK being cared for in the acute care hospital if needed    Recommendations & Counseling:  Start Methadone 2.5 mg po BID  Continue oxycodone 5-10 mg po q 4 hours prn; refill sent  Narcan nasal spray also sent to pharmacy  Start Celecoxib 100 mg po BID--hold for stomach ache--Connor is not currently on an anticoagulant  Check ECG in 2 weeks to monitor QTc while initiating methadone  F/up in 4 weeks and nurse will call for symptom check in a week.    Counseling: All of the above was explained to the patient in lay language. The patient has verbalized a clear understanding of the discussion, asked appropriate questions, which have been answered to patient's apparent satisfaction. The patient is in agreement with the above plan.    73 minutes were spent on the date of the encounter doing chart review, history and exam, documentation and further activities as noted above.    Ajith Brewer MD MS FAAFP  [facility] Palliative Care Service  Office [phone]  Fax [phone]

## 2023-03-03 ENCOUNTER — VIRTUAL VISIT (OUTPATIENT)
Dept: ONCOLOGY | Facility: CLINIC | Age: 43
End: 2023-03-03
Attending: NURSE PRACTITIONER
Payer: COMMERCIAL

## 2023-03-03 ENCOUNTER — TELEPHONE (OUTPATIENT)
Dept: ONCOLOGY | Facility: CLINIC | Age: 43
End: 2023-03-03
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
## 2023-03-03 DIAGNOSIS — C79.31 BRAIN METASTASIS: ICD-10-CM

## 2023-03-03 DIAGNOSIS — I26.99 PULMONARY EMBOLISM, UNSPECIFIED CHRONICITY, UNSPECIFIED PULMONARY EMBOLISM TYPE, UNSPECIFIED WHETHER ACUTE COR PULMONALE PRESENT (H): ICD-10-CM

## 2023-03-03 DIAGNOSIS — C34.31 MALIGNANT NEOPLASM OF LOWER LOBE OF RIGHT LUNG (H): Primary | ICD-10-CM

## 2023-03-03 DIAGNOSIS — R05.1 ACUTE COUGH: ICD-10-CM

## 2023-03-03 DIAGNOSIS — J98.4 PNEUMONITIS: ICD-10-CM

## 2023-03-03 DIAGNOSIS — I10 HTN, GOAL BELOW 140/90: ICD-10-CM

## 2023-03-03 DIAGNOSIS — I67.89 RADIATION THERAPY INDUCED BRAIN NECROSIS: Primary | ICD-10-CM

## 2023-03-03 DIAGNOSIS — C34.31 MALIGNANT NEOPLASM OF LOWER LOBE OF RIGHT LUNG (H): ICD-10-CM

## 2023-03-03 PROCEDURE — 99215 OFFICE O/P EST HI 40 MIN: CPT | Mod: VID | Performed by: NURSE PRACTITIONER

## 2023-03-03 NOTE — NURSING NOTE
Is the patient currently in the state of [place]? YES    Visit mode:VIDEO    If the visit is dropped, the patient can be reconnected by: VIDEO VISIT: Text to cell phone: [phone]    Will anyone else be joining the visit? NO      How would you like to obtain your AVS? MyChart    Are changes needed to the allergy or medication list? NO    Patient declined individual allergy and medication review by support staff because pt states nothing has changed since last reviewed yesterday March 2nd 2023.    Reason for visit: Return Visit    Herber VAZ

## 2023-03-03 NOTE — TELEPHONE ENCOUNTER
Prior Authorization Retail Medication Request    Medication/Dose: METHADONE HCL 5MG TABS  ICD code (if different than what is on RX):    Previously Tried and Failed:    Rationale:      Insurance Name:  Blue Plus   Insurance ID:  [id number]      Pharmacy Information (if different than what is on RX)  Name:  [facility]  Phone:  [phone]

## 2023-03-03 NOTE — TELEPHONE ENCOUNTER
Form completed and sent to Doctor via interoffice mail for review and signature.    Prior Authorization Retail Medication Request    Medication/Dose: NALOXONE HCL 4MG/0.1ML LIQUID   ICD code (if different than what is on RX):    Previously Tried and Failed:    Rationale:      Insurance Name:  Blue Plus  Insurance ID:  [id number]      Pharmacy Information (if different than what is on RX)  Name:  [facility]  Phone:  [phone]

## 2023-03-04 NOTE — TELEPHONE ENCOUNTER
Central Prior Authorization Team   Phone: [phone]      PA Initiation    Medication: NALOXONE HCL 4MG/0.1ML LIQUID -PA initiated  Insurance Company: [facility] - Phone [phone] Fax [phone]  Pharmacy Filling the Rx: [id number] [address] AT [place] [address]  Filling Pharmacy Phone: [phone]  Filling Pharmacy Fax:    Start Date: 3/4/2023

## 2023-03-04 NOTE — TELEPHONE ENCOUNTER
Central Prior Authorization Team   Phone: [phone]      PA Initiation    Medication: METHADONE HCL 5MG TABS-PA initiated  Insurance Company: Blue Plus [facility] - Phone [phone] Fax [phone]  Pharmacy Filling the Rx: [id number] [address] AT [place] [address]  Filling Pharmacy Phone: [phone]  Filling Pharmacy Fax:    Start Date: 3/4/2023

## 2023-03-07 ENCOUNTER — TELEPHONE (OUTPATIENT)
Dept: ONCOLOGY | Facility: CLINIC | Age: 43
End: 2023-03-07
Payer: MEDICAID

## 2023-03-07 NOTE — TELEPHONE ENCOUNTER
Patient cannot be identified by [employer] through C[facility]. I spoke to Vasiliy and he will fax the form to my attention.

## 2023-03-07 NOTE — ORAL ONC MGMT
Oral Chemotherapy Monitoring Program    Subjective/Objective:  Connor Emerson is a 42 year old male contacted by phone for a follow-up visit for oral chemotherapy.  Connor indicated that things are going well since resuming Alunbrig at the reduced dose of 60mg on Saturday 3/4/2023. He said his cough is almost absent. He denied any other adverse effects.    ORAL CHEMOTHERAPY 1/11/2023 1/11/2023 1/16/2023 2/8/2023 2/17/2023 3/1/2023 3/7/2023   Assessment Type Discontinuation Initial Work up New Teach Refill;Other Other Other Initial Follow up   Stop Date 1/11/2023 - - - - - -   Reason for Discontinuation Unacceptable toxicity - - - - - -   Diagnosis Code Non-Small Cell Lung Cancer Non-Small Cell Lung Cancer Non-Small Cell Lung Cancer Non-Small Cell Lung Cancer Non-Small Cell Lung Cancer - Non-Small Cell Lung Cancer   Providers Dr Cori Persaud - Dr Persaud   [facility]   Drug Name Alecensa (alectinib) Alunbrig (brigatinib) Alunbrig (brigatinib) Alunbrig (brigatinib) Alunbrig (brigatinib) Alunbrig (brigatinib) Alunbrig (brigatinib)   Dose - Other: Other: 90 mg - - 60 mg   Current Schedule - Daily Daily Daily - - Daily   Cycle Details - Continuous Continuous Other - Drug on Hold Continuous   Start Date of Last Cycle - - - - - - 3/4/2023   Planned next cycle start date - 2/1/2023 2/1/2023 - - - -   Doses missed in last 2 weeks - - - - - - 0   Adherence Assessment - - - - - - Adherent   Adverse Effects - - - - - Other (See Note for Details) No AE identified during assessment   Myalgias/Arthralgias - - - - - - -   Pharmacist Intervention(myalgias/arthralgias) - - - - - - -   Intervention(s) - - - - - - -   Pharmacist Intervention(anemia) - - - - - - -   Intervention(s) - - - - - - -   Other (See Note for Details) - - - - - - -   Pharmacist intervention(other) - - - - - Yes -   Intervention(s) - - - - - Recommend drug hold  "-   Any new drug interactions? - - Yes - - - No   Pharmacist Intervention? - - Yes - - - -   Intervention(s) - - Patient Education - - - -   Is the dose as ordered appropriate for the patient? - - Yes - - - Yes   Is the patient currently in pain? - - - - - - -   Does the patient feel the pain is currently being managed by a provider? - - - - - - -   Has the patient been assessed within the past 6 months for depression? - - - - - - -   Has the patient missed any days of school, work, or other routine activity? - - - - - - -   Days missed in the past month: - - - - - - -   Since the last time we talked, have you been hospitalized or used the emergency room? - - - - - - -   What medical service did you use? - - - - - - -       Last PHQ-2 Score on record:   PHQ-2 ( 1999 [employer]) 6/27/2022 2/24/2022   Q1: Little interest or pleasure in doing things 0 0   Q2: Feeling down, depressed or hopeless 0 0   PHQ-2 Score 0 0   PHQ-2 Total Score (12-17 Years)- Positive if 3 or more points; Administer PHQ-A if positive - -   Q1: Little interest or pleasure in doing things Not at all Not at all   Q2: Feeling down, depressed or hopeless Not at all Not at all   PHQ-2 Score 0 0       Vitals:  BP:   BP Readings from Last 1 Encounters:   02/09/23 (!) 171/101     Wt Readings from Last 1 Encounters:   01/09/23 97.8 kg (215 lb 11.2 oz)     Estimated body surface area is 2.18 meters squared as calculated from the following:    Height as of 11/16/22: 1.753 m (5' 9\").    Weight as of 1/9/23: 97.8 kg (215 lb 11.2 oz).    Labs:  _  Result Component Current Result Ref Range   Sodium 142 (2/9/2023) 136 - 145 mmol/L     _  Result Component Current Result Ref Range   Potassium 3.8 (2/9/2023) 3.4 - 5.3 mmol/L     _  Result Component Current Result Ref Range   Calcium 9.5 (2/9/2023) 8.6 - 10.0 mg/dL     No results found for Mag within last 30 days.     No results found for Phos within last 30 days.     No results found for ALBUMIN within last 30 days. "     _  Result Component Current Result Ref Range   Urea Nitrogen 18.0 (2/9/2023) 6.0 - 20.0 mg/dL     _  Result Component Current Result Ref Range   Creatinine 0.53 (L) (2/9/2023) 0.67 - 1.17 mg/dL     No results found for AST within last 30 days.     No results found for ALT within last 30 days.     No results found for BILITOTAL within last 30 days.     _  Result Component Current Result Ref Range   WBC Count 11.2 (H) (2/9/2023) 4.0 - 11.0 10e3/uL     _  Result Component Current Result Ref Range   Hemoglobin 16.7 (2/9/2023) 13.3 - 17.7 g/dL     _  Result Component Current Result Ref Range   Platelet Count 170 (2/9/2023) 150 - 450 10e3/uL     No results found for ANC within last 30 days.     _  Result Component Current Result Ref Range   Absolute Neutrophils 8.2 (2/9/2023) 1.6 - 8.3 10e3/uL          Assessment/Plan:  Connor appears to be doing well since resuming Alunbrig at the reduced dose of 60mg daily. Continue.    Follow-Up:        Jayden Herrera PharmD  [facility]  [phone]  March 7, 2023

## 2023-03-09 NOTE — TELEPHONE ENCOUNTER
Prior Authorization Not Needed per Insurance-Per Joselin at [employer]. This does not need a PA. It is covered. Pharmacy has a paid claim from 3/2/23.    Medication: NALOXONE HCL 4MG/0.1ML LIQUID -PA not needed  Insurance Company: [employer] [facility] - Phone [phone] Fax [phone]  Expected CoPay:      Pharmacy Filling the Rx: [id number] [id number] AT [place] [address]  Pharmacy Notified: No  Patient Notified: No

## 2023-03-09 NOTE — TELEPHONE ENCOUNTER
I received a denial for methadone.  I spoke to Joselin at [facility]. She states this is still under review pending review of information I faxed them earlier today.

## 2023-03-09 NOTE — TELEPHONE ENCOUNTER
Faxed additional information to [id number] on 3/8/23, but I got a 2nd request today. I re-sent via [employer] for the 2nd time.

## 2023-03-10 ENCOUNTER — PATIENT OUTREACH (OUTPATIENT)
Dept: PALLIATIVE CARE | Facility: CLINIC | Age: 43
End: 2023-03-10
Payer: MEDICAID

## 2023-03-10 ENCOUNTER — TELEPHONE (OUTPATIENT)
Dept: ONCOLOGY | Facility: CLINIC | Age: 43
End: 2023-03-10
Payer: MEDICAID

## 2023-03-10 NOTE — TELEPHONE ENCOUNTER
I spoke to Maame at [employer]. She states the absence of denial reason is a known issue with their system for medicaid patients. She is refaxing this denial to me now. I let her know it is imperative that I receive this quickly.

## 2023-03-10 NOTE — PROGRESS NOTES
[facility]: Palliative Care                                                                                        Called patient to check in following new methadone prescribed last week. He has unfortunately not received methadone yet - denied by insurance. Note, albuterol is also denied.     PA team is aware of the denial, but unclear how to move forward - they are waiting to hear back from insurance on denial criteria and how to move forward. Will wait to hear from PA team with next steps. Patient is aware.     He does report he was able to get celecoxib and has started. Still having a good amount of pain as he doesn't have methadone.     Will update ONC RNCC that he still does not have albuterol.     Plan to continue to work with PA team as well as check in with patient next week.     Signature:  Clover Mariscal, VINHN, RN, OCN

## 2023-03-10 NOTE — ORAL ONC MGMT
Oral Chemotherapy Monitoring Program     Placed call to patient in follow up of oral chemotherapy. Connor missed his lab appointment yesterday - he was not aware he had an appointment. He will reschedule his appointment for 3/15. Will follow up after labs are completed.     Ayde Bateman, PharmD, [facility]  Hematology/Oncology Clinical Pharmacist  [facility]  [facility]

## 2023-03-12 NOTE — TELEPHONE ENCOUNTER
PRIOR AUTHORIZATION DENIED    Medication: METHADONE HCL 5MG TABS-PA denied    Denial Date: 3/9/2023    Denial Rational: Must try/fail Bel[place]uca film, fentanyl patch and morphine ER tab        Appeal Information:

## 2023-03-13 NOTE — TELEPHONE ENCOUNTER
MEDICATION APPEAL APPROVED-Apparently this was denied prior to additional information.  Information that was faxed on 3/9 was reviewed as appeal and was approved.    Medication: METHADONE HCL 5MG TABS-PA approved  Authorization Effective Date:    Authorization Expiration Date:    Approved Dose/Quantity:   Reference #:     Insurance Company: [employer] - Phone [phone] Fax [phone]  Expected CoPay:       CoPay Card Available:      Foundation Assistance Needed:    Which Pharmacy is filling the prescription (Not needed for infusion/clinic administered): [id number] - [place], [place] - [address] AT [place] [address]

## 2023-03-13 NOTE — PROGRESS NOTES
[facility]: Palliative Care                                                                                        Received note from PA team methadone was approved. Called to make sure patient was aware/see if he had picked up med, but unable to reach him. Did leave general VM and advised I would follow up with [facility] message. Plan to call to check in end of week/early next week re: methadone start.     [employer] message sent    Signature:  Clover Mraiscal, VINHN, RN, OCN

## 2023-03-15 ENCOUNTER — LAB (OUTPATIENT)
Dept: LAB | Facility: CLINIC | Age: 43
End: 2023-03-15
Payer: COMMERCIAL

## 2023-03-15 DIAGNOSIS — C34.31 PRIMARY MALIGNANT NEOPLASM OF RIGHT LOWER LOBE OF LUNG (H): ICD-10-CM

## 2023-03-15 DIAGNOSIS — C79.31 BRAIN METASTASIS: ICD-10-CM

## 2023-03-15 DIAGNOSIS — C34.90 NON-SMALL CELL LUNG CANCER (NSCLC) (H): ICD-10-CM

## 2023-03-15 DIAGNOSIS — Z79.899 ENCOUNTER FOR LONG-TERM (CURRENT) USE OF MEDICATIONS: ICD-10-CM

## 2023-03-15 LAB
ALBUMIN SERPL BCG-MCNC: 4.2 G/DL (ref 3.5–5.2)
ALP SERPL-CCNC: 84 U/L (ref 40–129)
ALT SERPL W P-5'-P-CCNC: 17 U/L (ref 10–50)
AMYLASE SERPL-CCNC: 75 U/L (ref 28–100)
ANION GAP SERPL CALCULATED.3IONS-SCNC: 11 MMOL/L (ref 7–15)
AST SERPL W P-5'-P-CCNC: 22 U/L (ref 10–50)
BASOPHILS # BLD AUTO: 0 10E3/UL (ref 0–0.2)
BASOPHILS NFR BLD AUTO: 0 %
BILIRUB SERPL-MCNC: 0.5 MG/DL
BUN SERPL-MCNC: 18.1 MG/DL (ref 6–20)
CALCIUM SERPL-MCNC: 9.7 MG/DL (ref 8.6–10)
CHLORIDE SERPL-SCNC: 109 MMOL/L (ref 98–107)
CK SERPL-CCNC: 115 U/L (ref 39–308)
CREAT SERPL-MCNC: 0.71 MG/DL (ref 0.67–1.17)
DEPRECATED HCO3 PLAS-SCNC: 29 MMOL/L (ref 22–29)
EOSINOPHIL # BLD AUTO: 0.4 10E3/UL (ref 0–0.7)
EOSINOPHIL NFR BLD AUTO: 4 %
ERYTHROCYTE [DISTWIDTH] IN BLOOD BY AUTOMATED COUNT: 13.2 % (ref 10–15)
GFR SERPL CREATININE-BSD FRML MDRD: >90 ML/MIN/1.73M2
GLUCOSE SERPL-MCNC: 203 MG/DL (ref 70–99)
HCT VFR BLD AUTO: 43 % (ref 40–53)
HGB BLD-MCNC: 14.2 G/DL (ref 13.3–17.7)
LIPASE SERPL-CCNC: 69 U/L (ref 13–60)
LYMPHOCYTES # BLD AUTO: 2.4 10E3/UL (ref 0.8–5.3)
LYMPHOCYTES NFR BLD AUTO: 27 %
MCH RBC QN AUTO: 29.3 PG (ref 26.5–33)
MCHC RBC AUTO-ENTMCNC: 33 G/DL (ref 31.5–36.5)
MCV RBC AUTO: 89 FL (ref 78–100)
MONOCYTES # BLD AUTO: 0.6 10E3/UL (ref 0–1.3)
MONOCYTES NFR BLD AUTO: 7 %
NEUTROPHILS # BLD AUTO: 5.5 10E3/UL (ref 1.6–8.3)
NEUTROPHILS NFR BLD AUTO: 61 %
PLATELET # BLD AUTO: 224 10E3/UL (ref 150–450)
POTASSIUM SERPL-SCNC: 4.4 MMOL/L (ref 3.4–5.3)
PROT SERPL-MCNC: 6.5 G/DL (ref 6.4–8.3)
RBC # BLD AUTO: 4.84 10E6/UL (ref 4.4–5.9)
SODIUM SERPL-SCNC: 149 MMOL/L (ref 136–145)
WBC # BLD AUTO: 8.9 10E3/UL (ref 4–11)

## 2023-03-15 PROCEDURE — 85025 COMPLETE CBC W/AUTO DIFF WBC: CPT

## 2023-03-15 PROCEDURE — 83690 ASSAY OF LIPASE: CPT

## 2023-03-15 PROCEDURE — 36415 COLL VENOUS BLD VENIPUNCTURE: CPT

## 2023-03-15 PROCEDURE — 82550 ASSAY OF CK (CPK): CPT

## 2023-03-15 PROCEDURE — 82150 ASSAY OF AMYLASE: CPT

## 2023-03-15 PROCEDURE — 80053 COMPREHEN METABOLIC PANEL: CPT

## 2023-03-16 ENCOUNTER — MYC MEDICAL ADVICE (OUTPATIENT)
Dept: ONCOLOGY | Facility: CLINIC | Age: 43
End: 2023-03-16
Payer: COMMERCIAL

## 2023-03-16 NOTE — TELEPHONE ENCOUNTER
Oral Chemotherapy Monitoring Program  Lab Follow Up    Reviewed lab results from 3/15/23.    ORAL CHEMOTHERAPY 2/8/2023 2/17/2023 3/1/2023 3/7/2023 3/10/2023 3/13/2023 3/16/2023   Assessment Type Refill;Other Other Other Initial Follow up Lab Monitoring Other Lab Monitoring   Stop Date - - - - - - -   Reason for Discontinuation - - - - - - -   Diagnosis Code Non-Small Cell Lung Cancer Non-Small Cell Lung Cancer - Non-Small Cell Lung Cancer Non-Small Cell Lung Cancer Non-Small Cell Lung Cancer Non-Small Cell Lung Cancer   Providers Dr Cori Persaud - Dr Cori Persaud   [facility]   Drug Name Alunbrig (brigatinib) Alunbrig (brigatinib) Alunbrig (brigatinib) Alunbrig (brigatinib) Alunbrig (brigatinib) Alunbrig (brigatinib) Alunbrig (brigatinib)   Dose 90 mg - - 60 mg 60 mg - 60 mg   Current Schedule Daily - - Daily Daily - Daily   Cycle Details Other - Drug on Hold Continuous Continuous - Continuous   Start Date of Last Cycle - - - 3/4/2023 - - -   Planned next cycle start date - - - - - - -   Doses missed in last 2 weeks - - - 0 - - -   Adherence Assessment - - - Adherent - - -   Adverse Effects - - Other (See Note for Details) No AE identified during assessment - - -   Myalgias/Arthralgias - - - - - - -   Pharmacist Intervention(myalgias/arthralgias) - - - - - - -   Intervention(s) - - - - - - -   Pharmacist Intervention(anemia) - - - - - - -   Intervention(s) - - - - - - -   Other (See Note for Details) - - - - - - -   Pharmacist intervention(other) - - Yes - - - -   Intervention(s) - - Recommend drug hold - - - -   Any new drug interactions? - - - No - - -   Pharmacist Intervention? - - - - - - -   Intervention(s) - - - - - - -   Is the dose as ordered appropriate for the patient? - - - Yes - - -   Is the patient currently in pain? - - - - - - -   Does the patient feel the pain is currently being managed by a provider?  - - - - - - -   Has the patient been assessed within the past 6 months for depression? - - - - - - -   Has the patient missed any days of school, work, or other routine activity? - - - - - - -   Days missed in the past month: - - - - - - -   Since the last time we talked, have you been hospitalized or used the emergency room? - - - - - - -   What medical service did you use? - - - - - - -       Labs:  _  Result Component Current Result Ref Range   Sodium 149 (H) (3/15/2023) 136 - 145 mmol/L     _  Result Component Current Result Ref Range   Potassium 4.4 (3/15/2023) 3.4 - 5.3 mmol/L     _  Result Component Current Result Ref Range   Calcium 9.7 (3/15/2023) 8.6 - 10.0 mg/dL     No results found for Mag within last 30 days.     No results found for Phos within last 30 days.     _  Result Component Current Result Ref Range   Albumin 4.2 (3/15/2023) 3.5 - 5.2 g/dL     _  Result Component Current Result Ref Range   Urea Nitrogen 18.1 (3/15/2023) 6.0 - 20.0 mg/dL     _  Result Component Current Result Ref Range   Creatinine 0.71 (3/15/2023) 0.67 - 1.17 mg/dL     _  Result Component Current Result Ref Range   AST 22 (3/15/2023) 10 - 50 U/L     _  Result Component Current Result Ref Range   ALT 17 (3/15/2023) 10 - 50 U/L     _  Result Component Current Result Ref Range   Bilirubin Total 0.5 (3/15/2023) <=1.2 mg/dL     _  Result Component Current Result Ref Range   WBC Count 8.9 (3/15/2023) 4.0 - 11.0 10e3/uL     _  Result Component Current Result Ref Range   Hemoglobin 14.2 (3/15/2023) 13.3 - 17.7 g/dL     _  Result Component Current Result Ref Range   Platelet Count 224 (3/15/2023) 150 - 450 10e3/uL     No results found for ANC within last 30 days.     _  Result Component Current Result Ref Range   Absolute Neutrophils 5.5 (3/15/2023) 1.6 - 8.3 10e3/uL        Assessment & Plan:  Results show no concerning abnormalities. MyChart message sent to the patient on the results. Continue Alunbrig therapy as planned.      Follow-Up:  4/3: labs  4/5: appt with Dr. Cori Cordova, PharmD, BCACP  Hematology/Oncology Clinical Pharmacist  Oral Chemotherapy Monitoring Program  [facility]  [phone]

## 2023-03-17 ENCOUNTER — PATIENT OUTREACH (OUTPATIENT)
Dept: PALLIATIVE CARE | Facility: CLINIC | Age: 43
End: 2023-03-17
Payer: COMMERCIAL

## 2023-03-17 DIAGNOSIS — G89.3 CANCER ASSOCIATED PAIN: ICD-10-CM

## 2023-03-17 RX ORDER — OXYCODONE HYDROCHLORIDE 5 MG/1
5-10 TABLET ORAL EVERY 4 HOURS PRN
Qty: 60 TABLET | Refills: 0 | Status: SHIPPED | OUTPATIENT
Start: 2023-03-17 | End: 2023-04-07

## 2023-03-17 NOTE — PROGRESS NOTES
[facility]: Palliative Care                                                                                        Called patient to check in re: new methadone prescription.     He says really his biggest complaint is chemo has been hard. After he got steroids last time from Dr. Persaud he felt pretty good. He is now off the steroids and he says his whole body hurts. Muscles, bones, everything.     He has started the methadone as of Monday PM. Confirmed he is taking a half tab every 12 hours as prescribed. No relief or change in pain at this point. He understands methadone takes a several days to be fully effective so he is hopeful there will be improvement eventually.    He has continued oxycodone, usually takes 6 tabs daily - 2 TID. Has only #10 pills left and only got #50/60 tabs at last fill. He says he feels like in the last week he has needed more oxycodone due to pain.     The last few days he has been feeling more tired and sleeping more. He doesn't really know if he can say it's related to methadone or everything else going on for him.     We talked about MD's recommendation for an EKG within 2 weeks of starting methadone, he is okay with that and is happy to for a  to reach out.     He does need more oxycodone, hoping this can be signed off on before 5PM so he can get from the [facility]. Will route update and request to MD.     Last refill: 3/2/2023  Last office visit: 3/2/2023  Scheduled for follow up 4/4/2023    [place]  Report reviewed    Signature:  Clover Mariscal, VINHN, RN, OCN

## 2023-03-20 NOTE — PROGRESS NOTES
Virtual Visit Details    Type of service:  Video Visit   Video Start Time: 1215  Video End Time:12 27pm    Originating Location (pt. Location): Work    Distant Location (provider location):  Off-site  Platform used for Video Visit: [facility] ONCOLOGY FOLLOW UP NOTE    PATIENT NAME: Connor Emerson  ENCOUNTER DATE: 3/23/2023    Care Team  Primary Oncologist: Bassam Persaud MD    REASON FOR CURRENT VISIT: F/u of lung cancer    HISTORY OF PRESENT ILLNESS:  MrBailee Emerson is a 42 year old  male who is a non-smoker with PMHx of T2DM, HTN with metastatic NSCLC comes for follow up     Oncologic Hx:    Diagnosis:   Stage IV NSCLC, Rt lung adenocarcinoma with metastasis to pleura, mediastinum , rt pleural effusion and brain diagnosed 1/2022 (AJCC 8th edition)  PD-L1 TPS 2-3% by Rosalie   NGS [facility] panel-EML4:ALK rearragement  NGS Guardant- GNAS R201H, KRAS K5E- No ALK    Treatment:    2/23/2022- current: Brigatinib. Dose reduced to 60 mg due to cough after two days of 90 mg daily     3/2/22- 12/2022 Alectinib 300 mg BID (Dose reduced to 450 mg BID from 600 mg BID due to grade 3 myalgias 3/21/22, again reduced to 300 mg BID 9/28/22)    )  Past:  2/15/22- GK to 11 briain lesions  6/28/22- Craniotomy, resection  9/28/22-10/26- Bevacizumab for radiation necrosis (stopped due to PE)    Intent of treatment: Palliative    Oncologic course:  1/19/22 to 1/22/22-Admitted to [facility] for 2 week progressive SOB secondary to have large rt sided pleural effusion, needing thoracentesis x2 (1.7L and 2.0 L removed), cytology positive for malignancy, adenocarcinoma.   1/26/22- Rt pleural mass biopsy-Dr. Agrawal--POSITIVE FOR ADENOCARCINOMA CONSISTENT WITH LUNG PRIMARY, admixed with mesothelial hyperplasia and inflammatory infiltrate (+ TTF-1 and CK 7;  negative  p40, calretinin and WT-1. PAX8 immunostain focal +). 4th thoracentesis done simultaneously - 3L approx removed.   2/1/22- PET/CT-Right lower lobe central infiltrative FDG avid  8.2 x 9.6 cm mass representing a primary lung adenocarcinoma. Ipsilateral right perihilar, bilateral pretracheal, subcarinal and superior mediastinal michele metastases. Contralateral mildly FDG avid few lung nodules are suspicious for contralateral metastasis. At least 3 intracranial metastases in the right frontal lobe, left frontal lobe and left cerebellar hemisphere. Nonspecific mild diffuse bone marrow uptake. Further evaluation with a spine MRI could be considered to rule out early marrow infiltration. This could also be seen with red marrow conversion.  2/5/22-  Brain MRI- At least 9 intracranial metastases as detailed above. The dominant lesions involving the orbital right frontal lobe, the posterior left middle frontal gyrus, anterior right temporal lobe and in the left cerebellar hemisphere have surrounding moderate vasogenic type edema.  2/15/22- Saw Dr. Arango from [facility] Onc- Rcd GK to 12 lesion in bran  2/16/22- Pleurex placement   3/2/22- Started Alectinib 600 mg BID  3/21/22- Dose reduced to 450 mg BID due to grade 3 myalgias and fatigue  4/2/22 to 4/5/22- Admitted at [facility] for- Severe sepsis due to MSSA infection of right PleurX catheter s/p removal- He presented with onset of pain at tube site starting 4/1; at arrival was tachycardic with leukocytosis (22.7) and elevated lactic acid (2.9).  CT chest showed fluid and stranding tracking outside the pleural space into chest wall along pleural catheter.  IR was consulted and removed catheter 4/2 with report of pustular drainage and tip culture growing MSSA.  Thoracic Surg was consulted who felt no surgical indication necessary given minimal pleural fluid and lack of any signs of abscess.  Initially treated with broad spectrum coverage for sepsis, narrowed to Ancef once sensitivities returned with plan to transition to cefadroxil for an additional 10 days at discharge per ID. Held drug 4/2 to 4/11 5/2/22- CT CAP- Overall, positive response to therapy  with decreased size of right lower lobe and right pleural-based masses, pulmonary metastases, hilar and mediastinal lymphadenopathy. However, a single right posterior pleural-based mass has slightly increased in size since 2/24/2022. No metastatic disease in the abdomen and pelvis. Right Pleurx catheter has been removed. Trace right pleural effusion and right basilar atelectasis.  5/2/22- Brain MRI- The previously demonstrated brain metastases are mildly diminished in size versus to 2/5/2022. The degree of edema is also diminished but not completely resolved. Probable trace amounts of intralesional bleeding demonstrated on the gradient sequence within the metastases. No definite new metastasis or progressive mass effect. No hydrocephalus or infarct.    6/15/22 to 6/17/22- Admitted at [facility]-with aphasia and word finding difficulty over last few weeks.  He presented to [facility] ED on 6/10 for evaluation of his symptoms. MRI brain showed multiple intracranial metastases, with interval enlargement of the dominant lesion within the left frontal lobe and increased surrounding vasogenic edema with 2 mm rightward shift of the septum pellucidum. Due to his worsening anxiety, he left AMA. His symptoms continued to progress to where he could not write at work so he decided to go to the ED for re-evaluation and treatment. Evaluated by NSGY, Rad Onc (radiaiton necrosis vs tumor progression).  6/16/22- MR Brain (6/16) shows multiple intracranial metastases, with interval enlargement  of the dominant lesion within the left frontal lobe and increased surrounding vasogenic edema with 2 mm rightward shift of the septum pellucidum.  6/16/22- - CT CAP shows slightly decreased size of right lower lobe and right pleural-based masses. No new pulmonary nodules or lymphadenopathy; No evidence of metastatic disease in the abdomen or pelvis.   6/28/22 to 6/30/22- Admitted at [facility]- Elective left Stealth craniotomy with resection of brain tumor  due to ongoing symptoms. No intraoperative complications. EBL 50 ml.  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  7/5/22- Ct CAP- Right lower lobe low-density nodules are not significantly changed. A small left upper lobe pulmonary nodule is also unchanged. Trace pleural fluid on the right has increased slightly. No convincing evidence for metastatic disease in the abdomen or pelvis.  7/18/22 to 7/19/22- Admitted to [facility] for seizure- Reportedly was only taking once Keppra instead of twice daily. Also resumed on dexamethasone 2 mg daily  8/1/22- Brain MRI- Redemonstrated postsurgical changes status post left frontoparietal Craniotomy. Interval increase in size of the dominant ring-enhancing lesion in the left posterior superior frontal lobe with increased moderate surrounding vasogenic edema and local mass effect resulting in narrowing of the supratentorial ventricular system. No significant midline shift/herniation at this time  8/1/22- Dex was increased to 4 mg daily by Dr. Moran  9/1/22- CT Chest- Near resolution of previously seen right pleural nodule. Stable right lower lobe pulmonary nodule  9/28/22- Bevacizumab for radiation necrosis  10/26/22- Bevacizumab   10/26/22- Ct CAP- Stable posterior medial right lower lobe 1.9 x 1.1 cm nodule series 8 image 176. Adjacent stable scarring and atelectasis. The previously noted pleural nodule posteriorly on the right is not currently clearly identified. Stable left upper lobe 0.3 cm nodule image 56  10/26/22- Brain MRI- Overall improved appearance of multiple intracranial metastases with near resolution of associated edema and diminished enhancement and size of multiple residual lesions. No definite new or progressive metastasis.  11/7/22 to 11/9/22- Admitted for PE and HTN urgency- Small pulmonary embolism in the right lower lobe pulmonary artery. started on Lovenox, Brain MRI neg for PRES.  12/29/22- ED visit- bilateral hip pain, pain in shoulders, knuckles,  knees, and ankles- holding alectinib since 12/20/22 1/6/23- Ct CAP- Mild groundglass nodularity in the left upper lobe is new since the previous exam, and may be infectious in etiology. No other significant interval change. Pulmonary nodules are not significantly changed.  1/6/23- Brain MRI- Stable to diminished sequelae of intracranial metastasis and treatment changes. No new or progressive metastasis. No superimposed acute intracranial finding.      He works as a maintenance manger for apartment complexes    Interval Hx:  Connor is here to follow up. He tolerated the re-start of brigatinib 60 mg since our last meeting. He denies return of his cough or sob. He does have wheezing at times but uses his inhaler only 1x per week.     He has some generalized stiffness/myalgias but this is much better that prior and is working with palliative for methadone mgmt. He reports the discomfort is not impairing his ability to work, function, or do things. He does have some pain near his left rib, previously this was on the right side. It is tender to the touch, no worse with breathing, not constant, just sometimes there.      ECOG PS 0    REVIEW OF SYSTEMS: 14 point ROS negative other than the symptoms noted above in the HPI.    Wt Readings from Last 4 Encounters:   01/09/23 97.8 kg (215 lb 11.2 oz)   12/29/22 97.5 kg (215 lb)   11/22/22 99.7 kg (219 lb 12.8 oz)   11/16/22 94.8 kg (209 lb)      Review of Systems:  A comprehensive ROS was performed and found to be negative or non-contributory with the exception of that noted in the HPI above.    Past Medical History:  GERD  Hypertension, not on medication  Type 2 diabetes mellitus, not on medications currently, previously on Metformin    Past Surgical History:  Past Surgical History:   Procedure Laterality Date     BRONCHOSCOPY RIGID OR FLEXIBLE W/TRANSENDOSCOPIC ENDOBRONCHIAL ULTRASOUND GUIDED Bilateral 1/26/2022    Procedure: Right BRONCHOSCOPY, FIBEROPTIC, endobronchial  ultrasound, pleural biopsy;  Surgeon: Dallin Agrawal MD;  Location: [facility] OR     INJECT BLOCK MEDIAL BRANCH CERVICAL/THORACIC/LUMBAR       INSERT CHEST TUBE Right 2022    Procedure: INSERTION, CATHETER, INTERCOSTAL, FOR DRAINAGE;  Surgeon: Dallin Agrawal MD;  Location: [facility] GI     INSERT CHEST TUBE Right 3/9/2022    Procedure: INSERTION, CATHETER, INTERCOSTAL, FOR DRAINAGE;  Surgeon: Sushila Antonio MD;  Location: [facility] GI     IR CHEST TUBE REMOVAL TUNNELED RIGHT  2022     OPTICAL TRACKING SYSTEM CRANIOTOMY, EXCISE TUMOR, COMBINED Left 2022    Procedure: Left stealth craniotomy for tumor resection with motor mapping;  Surgeon: Stephen Moran MD;  Location:  OR     ORTHOPEDIC SURGERY      Ganesh. Rotator cuff repair.     PLEUROSCOPY N/A 2022    Procedure: Pleuroscopy with Pleural Biopsy;  Surgeon: Dallin Agrawal MD;  Location:  OR       Social History:  Lives with wife and 4 kids in [place]. Works as a  for an [employer] complex in [place]. Exposure to household chemicals and . No significant exposure to asbestos. No signal exposure to benzene or similar chemicals. No significant smoking history-states that he smoked 1 to 2 cigarettes occasionally per month for about 2 years in college, non-smoking since then. No significant alcohol use history. No other recreational substances. Good support system. Kids are 23, 19, 17 and 13.    Family History  Significant history for cancers on maternal side. Mother  of uterine cancer. 2 maternal uncles have possible metastatic melanoma.    Outpatient Medications:  Current Outpatient Medications   Medication     albuterol (PROAIR HFA/PROVENTIL HFA/VENTOLIN HFA) 108 (90 Base) MCG/ACT inhaler     alcohol swab prep pads     blood glucose (NO BRAND SPECIFIED) test strip     blood glucose calibration (NO BRAND SPECIFIED) solution     blood glucose monitoring (NO BRAND SPECIFIED) meter device kit     brigatinib (ALUNBRIG) 30 MG  TABS tablet     celecoxib (CELEBREX) 100 MG capsule     citalopram (CELEXA) 20 MG tablet     Continuous Blood Gluc Sensor (FREESTYLE IRENE 2 SENSOR) MISC     hydrochlorothiazide (HYDRODIURIL) 25 MG tablet     insulin aspart (NOVOLOG PEN) 100 UNIT/ML pen     insulin glargine (LANTUS PEN) 100 UNIT/ML pen     insulin pen needle (31G X 8 MM) 31G X 8 MM miscellaneous     levETIRAcetam (KEPPRA) 1000 MG tablet     lisinopril (ZESTRIL) 40 MG tablet     metFORMIN (GLUCOPHAGE XR) 500 MG 24 hr tablet     methadone (DOLOPHINE) 5 MG tablet     methocarbamol (ROBAXIN) 500 MG tablet     naloxone (NARCAN) 4 MG/0.1ML nasal spray     oxyCODONE (ROXICODONE) 5 MG tablet     prochlorperazine (COMPAZINE) 10 MG tablet     prochlorperazine (COMPAZINE) 10 MG tablet     propranolol (INDERAL) 20 MG tablet     rivaroxaban ANTICOAGULANT (XARELTO) 20 MG TABS tablet     thin (NO BRAND SPECIFIED) lancets     No current facility-administered medications for this visit.     PHYSICAL EXAMINATION ATTAINABLE DURING VIDEO VISIT:  CONSTITUTIONAL - Pt looks like stated age, pleasant, not in acute distress. Not obese.  NEURO: Oriented to time, person, and places. No tremor.  ENT, MOUTH: Pupils are equal.  Sclerae are anicteric.  Moist oral mucosa. No oral thrush.   NECK:  No jugular venous distention.  No thyroid enlargement.   RESPIRATORY: talk nl, no sob during conversation, no cough.   MUSCULOSKELETAL/EXTREMITIES:  No edema.  No joint deformity. Normal range of motion  SKIN:  No petechiae.  No rash.  No signs of cellulitis.   PSYCHIATRIC: Normal mood and affect. Good memory. Proper insight and judgement.   THE REST OF A COMPREHENSIVE PHYSICIAL EXAM IS DEFERRED DUE TO COVID-19 PUBLIC HEALTH EMERGENCY RELATED VIDEO VISIT RESCTRICTION.      Labs & Studies: I personally reviewed the following studies:  Most Recent 3 CBC's:  Recent Labs   Lab Test 03/15/23  1432 02/09/23  1929 01/09/23  1529   WBC 8.9 11.2* 9.5   HGB 14.2 16.7 14.1   MCV 89 93 92     170 238     Most Recent 3 BMP's:  Recent Labs   Lab Test 03/15/23  1432 02/09/23  1929 12/29/22  1841   * 142 144   POTASSIUM 4.4 3.8 3.4   CHLORIDE 109* 103 104   CO2 29 25 27   BUN 18.1 18.0 20.4*   CR 0.71 0.53* 0.70   ANIONGAP 11 14 13   TORY 9.7 9.5 9.5   * 231* 140*    Most Recent 2 LFT's:  Recent Labs   Lab Test 03/15/23  1432 12/29/22  1841   AST 22 20   ALT 17 17   ALKPHOS 84 83   BILITOTAL 0.5 0.6    Most Recent TSH and T4:  Recent Labs   Lab Test 09/12/22  1642   TSH 2.56        ASSESSMENT AND PLAN:  Stage IV NSCLC, Rt lung adenocarcinoma with metastasis to pleura, mediastinum , rt pleural effusion and brain diagnosed 1/2022 (AJCC 8th edition)  PD-L1 TPS 2-3% by [employer]   NGS [facility] panel-EML4:ALK rearragement; chr2:[id number], chr2:[id number]  NGS Guardant- GNAS R201H, KRAS K5E- No ALK    He began Alectinib 600 mg BID 3/2/22 and unfortunately developed grade 3 myalgias which have improved with lowering the dose 450 mg BID. He was holding drug 4/2/22 to 4/11/22 due to MSSA infection from pleurex which is removed. Resumed at 450 mg BID. Due to ongoing myalgias (Although CK is normal), we dose reduced to 300 mg BID.     In Dec 2022, developed Gr 3 arthralgia, and we stopped drug since 12/20/22. Had unremitting arthalgias, eventully had to starte PO steroids which led to improvement and resolved. Radiographicaly, he has had a near CR to Rx in the lung, has a residual rt LL lesion.     Began brigatinib 2/22/23 (delayed due to pt hesitancy). Developed severe cough on day 2 so brigatinib was held and cough resolved with in 24-48 hours. He restarted 60 mg daily now a couple weeks ago and has been tolerating without return of cough.  Thus I think we can increase to 90 mg starting tomorrow    Plan  Increase brigatinib to 90 mg starting tomorrow  Reach out to us if develops new toxicity  Other appts as scheduled    #right chest/rib pain---> intermittent, now on left  Reproducible and intermittent, worse  with movement of torso. Unsure of etiology but we agreed to monitor given mild, possibly msk, and no correlating respiratory or cardiac symptoms.      # Grade 3 arthralgias most likley related to alectinib, unremiittign with tylenol, NSAIDs or xocyodoen,  CK and aldolase has been normal   -All joints affected, no morning stiffness, normal ESRa nd CRP  -following with palliative; on methadone      # PE: provoked by malignancy vs bevacizumab in 11/2022  -New right-sided pleuritic chest pain, tachycardiam, CT showing rt sided sub segmental PE wo   -- on rivaroxiabn 20 mg daily    # G3 diastolic HTN - led to one dose hold of bevacizumab and now discontinued  Adm with /111. No evidence of end-organ damage. Most likley from Bevacizumab. Recent MRI brain revealed no PRES, stable necrotic/mets areas and no new enhancement.  - HTCZ, lisinopril, and propranolol, mgmt per PCP     # Brain mets:  # Radiation necrosis  Baseline Brain MRI with several brain mets, s/p GK to 11-12 brain mets. F/u Brain MRI in June was showing enlargement of the one of the lesions along with edema, therefore had to undergo craniotomy followed by resection, the final biopsy consistent with radiation necrosis.  Had issues with radiation necrosis and swelling requiring steroids but these were driving up blood sugars drastically  -Began bevacizumab for radiation necrosis --15 mg/kg every 3 weeks, plan for at least 3 months of therapy.  Dexamethasone taper through 10/31. S/p 2 doses of Bevacixumab now, last one 10/26 , but had to hold it given he had HTN urgency and PE.  -Recent Brain MRI showing Rx effect and resolution of radiation necrosis, will have to watch closely given he is off of bevaciuzumab  -Next Brain MRI 4/2023    # Type 2 Diabetes: Self monitoring of blood glucose is not at goal of fasting  mg/dL. Now off of dexamethasone. Started metformin 500mg ER every day    --Started using Mohan 2 continous glucose monitor  --FOllows  medication management-   --on  Metformin and insulin    #normocytic anemia: sudden drop to <7, requiring 1 unit blood transfusion. Lab work up unrevealing. Has recovered to baseline  -consider EGD if hgb drops again, risk for GI bleed with chronic steroid use    #MSSA infection, Sepsis at pleurex site- resolved  # Pleural effusion: s/p pleurex palcement  2/16/22. Then on 4/2 right PleurX catheter s/p removal for severe sepsis due to MSSA infection.      30 minutes spent on the date of the encounter doing chart review, review of test results, interpretation of tests, patient visit, documentation and discussion with other provider(s)     Chelsea Villegas CNP on 3/23/2023 at 12:43 PM

## 2023-03-23 ENCOUNTER — VIRTUAL VISIT (OUTPATIENT)
Dept: ONCOLOGY | Facility: CLINIC | Age: 43
End: 2023-03-23
Attending: NURSE PRACTITIONER
Payer: COMMERCIAL

## 2023-03-23 ENCOUNTER — DOCUMENTATION ONLY (OUTPATIENT)
Dept: ONCOLOGY | Facility: CLINIC | Age: 43
End: 2023-03-23

## 2023-03-23 DIAGNOSIS — C79.31 BRAIN METASTASIS: ICD-10-CM

## 2023-03-23 DIAGNOSIS — C34.31 MALIGNANT NEOPLASM OF LOWER LOBE OF RIGHT LUNG (H): Primary | ICD-10-CM

## 2023-03-23 DIAGNOSIS — R07.89 ATYPICAL CHEST PAIN: ICD-10-CM

## 2023-03-23 DIAGNOSIS — R73.9 HYPERGLYCEMIA: ICD-10-CM

## 2023-03-23 PROCEDURE — 99214 OFFICE O/P EST MOD 30 MIN: CPT | Mod: VID | Performed by: NURSE PRACTITIONER

## 2023-03-23 NOTE — NURSING NOTE
Is the patient currently in the state of [place]? YES    Visit mode:VIDEO    If the visit is dropped, the patient can be reconnected by: VIDEO VISIT: Text to cell phone: [phone]    Will anyone else be joining the visit? NO      How would you like to obtain your AVS? MyChart    Are changes needed to the allergy or medication list? NO    Reason for visit: Connor Emerson 42 year old male presents today for f/u on lung cancer. Pt reports on going right sided pain that now radiates to the left side. Pt states left sided pain has now been consistent since yesterday. Pt rates pain as moderate at 4 on pain scale for today.  Loli Bravo, Virtual Facilitator

## 2023-03-31 ENCOUNTER — TELEPHONE (OUTPATIENT)
Dept: ONCOLOGY | Facility: CLINIC | Age: 43
End: 2023-03-31
Payer: MEDICAID

## 2023-03-31 DIAGNOSIS — I67.89 RADIATION THERAPY INDUCED BRAIN NECROSIS: Primary | ICD-10-CM
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
## 2023-03-31 DIAGNOSIS — C34.31 MALIGNANT NEOPLASM OF LOWER LOBE OF RIGHT LUNG (H): ICD-10-CM

## 2023-03-31 NOTE — ORAL ONC MGMT
Oral Chemotherapy Monitoring Program    Subjective/Objective:  Connor Emerson is a 42 year old male contacted by phone for a dose increase assessment for oral chemotherapy. Connor confirms increasing to Brigatinib 90 mg po daily about a week ago. He denies cough. He does report his is fatigued, his energy levels are very low and he is sleeping alot. He said this has been going on for about two weeks and he will address with Dr Persaud at his followup appt next week.     Assessment/Plan:  Connor is tolerating Brigatinib dose increase with notable fatigue which he will discuss with Dr Persaud next week. Continue taking as prescribed.     Follow-Up:  4/3 labs/imaging appts and 4/5 Dr Persaud appt    Erna Rashid,PharmD, [facility]  Oral Chemotherapy Monitoring Program  [facility]  [phone]  March 31, 2023        ORAL CHEMOTHERAPY 3/1/2023 3/7/2023 3/10/2023 3/13/2023 3/16/2023 3/23/2023 3/31/2023   Assessment Type Other Initial Follow up Lab Monitoring Other Lab Monitoring Other Other   Stop Date - - - - - - -   Reason for Discontinuation - - - - - - -   Diagnosis Code - Non-Small Cell Lung Cancer Non-Small Cell Lung Cancer Non-Small Cell Lung Cancer Non-Small Cell Lung Cancer Non-Small Cell Lung Cancer Non-Small Cell Lung Cancer   Providers - Dr Cori Persaud   Clinic Name/Location - [facility]   Drug Name Alunbrig (brigatinib) Alunbrig (brigatinib) Alunbrig (brigatinib) Alunbrig (brigatinib) Alunbrig (brigatinib) Alunbrig (brigatinib) Alunbrig (brigatinib)   Dose - 60 mg 60 mg - 60 mg 90 mg 90 mg   Current Schedule - Daily Daily - Daily Daily Daily   Cycle Details Drug on Hold Continuous Continuous - Continuous Continuous Continuous   Start Date of Last Cycle - 3/4/2023 - - - - -   Planned next cycle start date - - - - - - -   Doses missed in last 2 weeks - 0 - - - - -   Adherence Assessment - Adherent - - - - -  "  Adverse Effects Other (See Note for Details) No AE identified during assessment - - - - -   Myalgias/Arthralgias - - - - - - -   Pharmacist Intervention(myalgias/arthralgias) - - - - - - -   Intervention(s) - - - - - - -   Pharmacist Intervention(anemia) - - - - - - -   Intervention(s) - - - - - - -   Other (See Note for Details) - - - - - - -   Pharmacist intervention(other) Yes - - - - - -   Intervention(s) Recommend drug hold - - - - - -   Any new drug interactions? - No - - - - -   Pharmacist Intervention? - - - - - - -   Intervention(s) - - - - - - -   Is the dose as ordered appropriate for the patient? - Yes - - - - -   Is the patient currently in pain? - - - - - - -   Does the patient feel the pain is currently being managed by a provider? - - - - - - -   Has the patient been assessed within the past 6 months for depression? - - - - - - -   Has the patient missed any days of school, work, or other routine activity? - - - - - - -   Days missed in the past month: - - - - - - -   Since the last time we talked, have you been hospitalized or used the emergency room? - - - - - - -   What medical service did you use? - - - - - - -       Last PHQ-2 Score on record:   PHQ-2 ( 1999 [employer]) 6/27/2022 2/24/2022   Q1: Little interest or pleasure in doing things 0 0   Q2: Feeling down, depressed or hopeless 0 0   PHQ-2 Score 0 0   PHQ-2 Total Score (12-17 Years)- Positive if 3 or more points; Administer PHQ-A if positive - -   Q1: Little interest or pleasure in doing things Not at all Not at all   Q2: Feeling down, depressed or hopeless Not at all Not at all   PHQ-2 Score 0 0       Vitals:  BP:   BP Readings from Last 1 Encounters:   02/09/23 (!) 171/101     Wt Readings from Last 1 Encounters:   01/09/23 97.8 kg (215 lb 11.2 oz)     Estimated body surface area is 2.18 meters squared as calculated from the following:    Height as of 11/16/22: 1.753 m (5' 9\").    Weight as of 1/9/23: 97.8 kg (215 lb 11.2 " oz).    Labs:  _  Result Component Current Result Ref Range   Sodium 149 (H) (3/15/2023) 136 - 145 mmol/L     _  Result Component Current Result Ref Range   Potassium 4.4 (3/15/2023) 3.4 - 5.3 mmol/L     _  Result Component Current Result Ref Range   Calcium 9.7 (3/15/2023) 8.6 - 10.0 mg/dL     No results found for Mag within last 30 days.     No results found for Phos within last 30 days.     _  Result Component Current Result Ref Range   Albumin 4.2 (3/15/2023) 3.5 - 5.2 g/dL     _  Result Component Current Result Ref Range   Urea Nitrogen 18.1 (3/15/2023) 6.0 - 20.0 mg/dL     _  Result Component Current Result Ref Range   Creatinine 0.71 (3/15/2023) 0.67 - 1.17 mg/dL     _  Result Component Current Result Ref Range   AST 22 (3/15/2023) 10 - 50 U/L     _  Result Component Current Result Ref Range   ALT 17 (3/15/2023) 10 - 50 U/L     _  Result Component Current Result Ref Range   Bilirubin Total 0.5 (3/15/2023) <=1.2 mg/dL     _  Result Component Current Result Ref Range   WBC Count 8.9 (3/15/2023) 4.0 - 11.0 10e3/uL     _  Result Component Current Result Ref Range   Hemoglobin 14.2 (3/15/2023) 13.3 - 17.7 g/dL     _  Result Component Current Result Ref Range   Platelet Count 224 (3/15/2023) 150 - 450 10e3/uL     No results found for ANC within last 30 days.     _  Result Component Current Result Ref Range   Absolute Neutrophils 5.5 (3/15/2023) 1.6 - 8.3 10e3/uL

## 2023-04-04 ENCOUNTER — VIRTUAL VISIT (OUTPATIENT)
Dept: RADIATION ONCOLOGY | Facility: HOSPITAL | Age: 43
End: 2023-04-04
Attending: FAMILY MEDICINE
Payer: COMMERCIAL

## 2023-04-04 DIAGNOSIS — Z51.5 PALLIATIVE CARE PATIENT: Primary | ICD-10-CM

## 2023-04-04 DIAGNOSIS — G89.3 CANCER ASSOCIATED PAIN: ICD-10-CM

## 2023-04-04 DIAGNOSIS — C79.31 METASTASIS TO BRAIN (H): ICD-10-CM

## 2023-04-04 DIAGNOSIS — C34.90 NON-SMALL CELL LUNG CANCER, UNSPECIFIED LATERALITY (H): ICD-10-CM

## 2023-04-04 PROCEDURE — 99215 OFFICE O/P EST HI 40 MIN: CPT | Mod: VID | Performed by: FAMILY MEDICINE

## 2023-04-04 PROCEDURE — G0463 HOSPITAL OUTPT CLINIC VISIT: HCPCS | Mod: PN,GT | Performed by: FAMILY MEDICINE

## 2023-04-04 RX ORDER — METHADONE HYDROCHLORIDE 5 MG/1
TABLET ORAL
Qty: 45 TABLET | Refills: 0 | Status: SHIPPED | OUTPATIENT
Start: 2023-04-04 | End: 2023-06-17

## 2023-04-04 NOTE — PROGRESS NOTES
Virtual Visit Details    Type of service:  Video Visit   Video Start Time: 1:57 PM  Video End Time:1423    Originating Location (pt. Location): Other work in [place]    Distant Location (provider location):  On-site  Platform used for Video Visit: [facility]    Palliative Care Outpatient Clinic Progress Note    Patient Name: Connor Emerson  Primary Provider: Radha Shetty Ra    Impression & Recommendations & Counseling:  Connor Emerson is a 42 year old male with history of NSCLC with ALK rearrangement and mets to the brain s/p gamma knife treatments and craniotomy open excision and currently on a second line TKI. He is experiencing 'stiffness' from the TKI.  ECO  Decisional Capacity: very present     PDMP review:  Yes, no concerns     Metastatic NSCLC with ALK rearrangement  S/p GK to brain mets and craniotomy for excision  Cancer associated pain  TKI associated muscle stiffness     Goals of Care:  Connor wants to continue cancer-directed therapies as long as the side effects are tolerable.  He is a FULL code should he have a cardiac arrest. He is OK being cared for in the acute care hospital if needed     Recommendations & Counseling:  Continue cancer care per Dr. Persaud and his team  Increase  Methadone from 2.5 mg po BID to 2.5 mg in AM and 5 mg at HS; he has not felt over sedated.  Continue oxycodone 5-10 mg po q 4 hours prn; he will call when he needs a refill  Narcan nasal spray also sent to pharmacy  Start Celecoxib 100 mg po BID--hold for stomach ache--Connor is not currently on an anticoagulant  Check ECG in 2 weeks to monitor QTc while titrating methadone  F/up in 4 weeks and sooner prn.    Counseling: All of the above was explained to the patient in lay language. The patient has verbalized a clear understanding of the discussion, asked appropriate questions, which have been answered to patient's apparent satisfaction. The patient is in agreement with the above plan.      Chief Complaint/Patient ID:  "Connor Emerson 42 year old male with PMHx of  NSCLC with ALK rearrangement and mets to the brain s/p gamma knife treatments and craniotomy open excision and currently on a second line TKI. He is experiencing 'stiffness' from the TKI.      Last Palliative care appointment: 03/02/2023 with me     Reviewed: yes, no concerns    Interim History:  Connor Emerson is a 42 year old male who is seen today for follow up with Palliative Care via billable video visit.      Pain: \"stiffness' returned about a week ago; It's not as bad as it was earlier in the winter and it started about five weeks after starting a new TKI.  He's been using the methadone 2.5mg po Bid;     Appetite/Nausea: appetite decreased; doesn't feel hungry; willing to use Boost or Ensure BID and continue with one bigger meal a day     Bowels: none     Sleep: 'I sleep like a baby'     Mood:feels 'aggravated more' than at other times; this shows up at home some times; open to a referral to palliative ; he worries about what his emotional legacy might be with his family     Coping:  Overall OK but getting worried with the stiffness recurring.    Family History- Reviewed in Epic.    Allergies   Allergen Reactions     Vicodin [Hydrocodone-Acetaminophen] Nausea and Vomiting and GI Disturbance       Social History:  Pertinent changes to social history/social situation since last visit: daughter Sylvia (sp?) is having a two level spinal fusion next week related to her Beetles-Barley syndrome  Key support resources: wife and his dad  Advance Directive Status:  No ACP completed at this time    Social History     Tobacco Use     Smoking status: Never     Smokeless tobacco: Never   Vaping Use     Vaping status: Never Used     Passive vaping exposure: Yes   Substance Use Topics     Alcohol use: Yes     Alcohol/week: 0.0 standard drinks of alcohol     Comment: social     Drug use: No         Allergies   Allergen Reactions     Vicodin " [Hydrocodone-Acetaminophen] Nausea and Vomiting and GI Disturbance     Current Outpatient Medications   Medication Sig Dispense Refill     albuterol (PROAIR HFA/PROVENTIL HFA/VENTOLIN HFA) 108 (90 Base) MCG/ACT inhaler Inhale 2 puffs into the lungs every 6 hours as needed for shortness of breath, wheezing or cough 18 g 0     alcohol swab prep pads Use to swab area of injection/jabier as directed. 100 each 3     blood glucose (NO BRAND SPECIFIED) test strip Use to test blood sugar 2 times daily or as directed. To accompany: Blood Glucose Monitor Brands: per insurance. 100 strip 6     blood glucose calibration (NO BRAND SPECIFIED) solution To accompany: Blood Glucose Monitor Brands: per insurance. 1 each 0     blood glucose monitoring (NO BRAND SPECIFIED) meter device kit Use to test blood sugar 2 times daily or as directed. Preferred blood glucose meter OR supplies to accompany: Blood Glucose Monitor Brands: per insurance. 1 kit 0     brigatinib (ALUNBRIG) 30 MG TABS tablet Take 3 tablets (90 mg) by mouth once daily for 2 weeks, then take 4 tablets (120 mg) by mouth once daily for 1 week, then 5 tablets (150 mg) by mouth once daily for 1 week 105 tablet 0     celecoxib (CELEBREX) 100 MG capsule Take 1 capsule (100 mg) by mouth 2 times daily 60 capsule 4     citalopram (CELEXA) 20 MG tablet Take 1 tablet (20 mg) by mouth every evening 90 tablet 1     hydrochlorothiazide (HYDRODIURIL) 25 MG tablet Take 1 tablet (25 mg) by mouth daily 30 tablet 3     insulin aspart (NOVOLOG PEN) 100 UNIT/ML pen Inject 2 Units Subcutaneous 3 times daily (before meals) Plus adjustment scale 1:20 for blood sugars >150mg/dL max daily 50 units 45 mL 2     insulin glargine (LANTUS PEN) 100 UNIT/ML pen Inject 10-20 Units Subcutaneous daily On hold 11/22. Increase as directed, Max daily dose 100 units 30 mL 2     insulin pen needle (31G X 8 MM) 31G X 8 MM miscellaneous Use one pen needles daily or as directed. 100 each 0     levETIRAcetam  (KEPPRA) 1000 MG tablet Take 1 tablet (1,000 mg) by mouth 2 times daily 60 tablet 3     lisinopril (ZESTRIL) 40 MG tablet Take 1 tablet (40 mg) by mouth daily 30 tablet 2     metFORMIN (GLUCOPHAGE XR) 500 MG 24 hr tablet Week 1: Take one of the 500mg tablet once daily with food; Week 2: increase to 2 of the 500mg (1000mg) tablets per day; Week 3: increase to 3 of the 500mg (1500mg) tablets per day 90 tablet 2     methadone (DOLOPHINE) 5 MG tablet Take 0.5 tablets (2.5 mg) by mouth every 12 hours 15 tablet 0     naloxone (NARCAN) 4 MG/0.1ML nasal spray Spray 1 spray (4 mg) into one nostril alternating nostrils as needed for opioid reversal every 2-3 minutes until assistance arrives 0.2 mL 3     oxyCODONE (ROXICODONE) 5 MG tablet Take 1-2 tablets (5-10 mg) by mouth every 4 hours as needed for moderate to severe pain 60 tablet 0     prochlorperazine (COMPAZINE) 10 MG tablet Take 1 tablet (10 mg) by mouth every 6 hours as needed for nausea or vomiting 30 tablet 2     prochlorperazine (COMPAZINE) 10 MG tablet Take 1 tablet (10 mg) by mouth every 6 hours as needed (Nausea/Vomiting) 30 tablet 2     propranolol (INDERAL) 20 MG tablet Take 1 tablet (20 mg) by mouth 2 times daily 60 tablet 5     rivaroxaban ANTICOAGULANT (XARELTO) 20 MG TABS tablet Take 1 tablet (20 mg) by mouth daily (with dinner) (Patient taking differently: Take 20 mg by mouth daily (with dinner) Per Pt he is still using this Meds. 01/25/2023) 90 tablet 3     thin (NO BRAND SPECIFIED) lancets Use with lanceting device. To accompany: Blood Glucose Monitor Brands: per insurance. 100 each 6     Continuous Blood Gluc Sensor (FREESTYLE IRENE 2 SENSOR) [facility] 1 each every 14 days Apply as directed per  instructions (Patient not taking: Reported on 11/22/2022) 2 each 11     methocarbamol (ROBAXIN) 500 MG tablet Take 1 tablet (500 mg) by mouth 4 times daily (Patient not taking: Reported on 1/9/2023) 30 tablet 0     Past Medical History:   Diagnosis Date      Atypical chest pain 12/02/2013     Cancer (H)      Complication of anesthesia      Diabetes (H)      GERD (gastroesophageal reflux disease) 12/02/2013     HTN (hypertension) 05/14/2012     HTN, goal below 140/90 07/02/2013     Insomnia 02/21/2012     Mediastinal lymphadenopathy      Migraine headache 07/02/2013     Migraine with aura, without mention of intractable migraine without mention of status migrainosus      Pneumonia      Past Surgical History:   Procedure Laterality Date     BRONCHOSCOPY RIGID OR FLEXIBLE W/TRANSENDOSCOPIC ENDOBRONCHIAL ULTRASOUND GUIDED Bilateral 1/26/2022    Procedure: Right BRONCHOSCOPY, FIBEROPTIC, endobronchial ultrasound, pleural biopsy;  Surgeon: Dallin Agrawal MD;  Location: [facility] OR     INJECT BLOCK MEDIAL BRANCH CERVICAL/THORACIC/LUMBAR       INSERT CHEST TUBE Right 2/16/2022    Procedure: INSERTION, CATHETER, INTERCOSTAL, FOR DRAINAGE;  Surgeon: Dallin Agrawal MD;  Location: [facility] GI     INSERT CHEST TUBE Right 3/9/2022    Procedure: INSERTION, CATHETER, INTERCOSTAL, FOR DRAINAGE;  Surgeon: Sushila Antonio MD;  Location: [facility] GI     IR CHEST TUBE REMOVAL TUNNELED RIGHT  4/2/2022     OPTICAL TRACKING SYSTEM CRANIOTOMY, EXCISE TUMOR, COMBINED Left 6/28/2022    Procedure: Left stealth craniotomy for tumor resection with motor mapping;  Surgeon: Stephen Moran MD;  Location:  OR     ORTHOPEDIC SURGERY      Ganesh. Rotator cuff repair.     PLEUROSCOPY N/A 1/26/2022    Procedure: Pleuroscopy with Pleural Biopsy;  Surgeon: Dallin Agrawal MD;  Location: [facility] OR         Physical Exam:   GENERAL APPEARANCE:  healthy, alert and no distress; neatly groomed  EYES: Eyes grossly normal to inspection, PERRLA, conjunctivae and sclerae without injection or discharge, EOM intact   RESP:  no increased work of breathing; speaks in complete sentences;   MS: No musculoskeletal defects are noted  SKIN: No suspicious lesions or rashes, hydration status appears adequate with normal skin turgor    PSYCH: Alert and oriented x3; speech- coherent , normal rate and volume; able to articulate logical thoughts, able to abstract reason, no tangential thoughts, no hallucinations or delusions, mentation appears normal, Mood is euthymic. Affect is appropriate for this mood state and bright. Thought content is free of suicidal ideation, hallucinations, and delusions.  Eye contact is good during conversation.       Key Data Reviewed:  LABS: 03/15/2023- Cr 0.71, Albumin 4.2,  Hgb 14.2,      IMAGING: no recent imaging for review    EC2023 QTc 464 msec    40 minutes were spent on the date of the encounter doing chart review, history and exam, documentation and further activities as noted above.    Ajith Brewer MD MS FAAFP  [facility]  Office [phone]  Fax [phone]

## 2023-04-04 NOTE — PATIENT INSTRUCTIONS
"It was good to see you today, Connor.  I'm glad we had a chance to visit today.  Sara Thayer \"Good Job\" is my song recommendation.    Here are the things we talked about:  Keep taking the methadone 1/2 tablet in the morning and increase to a whole tablet (5 mg) in the evening before bed.    Get an ECG at any of our clinics in the next couple of weeks.    I sent in a request to set you up with an appointment with either Lanie or Maame who are social workers on my team.    Someone from the team will reach out to schedule a follow up appointment in 4 weeks and I can see you sooner, if needed.       How to get a hold of us:  For non-urgent matters, MyChart works best.    For more urgent matters, or if you prefer not to use MyChart, call our clinic nurse coordinator Reyna Ma RN at [phone]    We have an on-call number for evenings and weekends. Please call this only if you are having uncontrolled symptoms or serious side effects from your medicines: [phone].     For refills, please give us a week (5 working days) notice. We don't always have providers available everyday to do refills. If you call the day you run out of your medicine, we may not be able to refill it in time, so call 5 days in advance!    Ajith Brewer MD MS FAAFP  [facility]  Office [phone]  Fax [phone]     "

## 2023-04-04 NOTE — NURSING NOTE
Is the patient currently in the state of [place]? YES    Visit mode:VIDEO    If the visit is dropped, the patient can be reconnected by: VIDEO VISIT: Text to cell phone: [phone]    Will anyone else be joining the visit? NO      How would you like to obtain your AVS? MyChart    Are changes needed to the allergy or medication list? NO    Reason for visit: follow up

## 2023-04-06 ENCOUNTER — OFFICE VISIT (OUTPATIENT)
Dept: FAMILY MEDICINE | Facility: CLINIC | Age: 43
End: 2023-04-06
Payer: COMMERCIAL

## 2023-04-06 VITALS
DIASTOLIC BLOOD PRESSURE: 119 MMHG | TEMPERATURE: 97.6 F | BODY MASS INDEX: 32.69 KG/M2 | RESPIRATION RATE: 20 BRPM | WEIGHT: 220.7 LBS | HEART RATE: 67 BPM | SYSTOLIC BLOOD PRESSURE: 181 MMHG | OXYGEN SATURATION: 94 % | HEIGHT: 69 IN

## 2023-04-06 DIAGNOSIS — I67.89 RADIATION THERAPY INDUCED BRAIN NECROSIS: ICD-10-CM

## 2023-04-06 DIAGNOSIS — C79.31 MALIGNANT NEOPLASM METASTATIC TO BRAIN (H): ICD-10-CM

## 2023-04-06 DIAGNOSIS — Z79.4 TYPE 2 DIABETES MELLITUS WITH HYPERGLYCEMIA, WITH LONG-TERM CURRENT USE OF INSULIN (H): ICD-10-CM

## 2023-04-06 DIAGNOSIS — I10 PRIMARY HYPERTENSION: ICD-10-CM

## 2023-04-06 DIAGNOSIS — E66.01 MORBID OBESITY (H): ICD-10-CM

## 2023-04-06 DIAGNOSIS — C34.90 NON-SMALL CELL LUNG CANCER, UNSPECIFIED LATERALITY (H): ICD-10-CM

## 2023-04-06 DIAGNOSIS — R56.9 SEIZURE (H): ICD-10-CM

## 2023-04-06 DIAGNOSIS — E11.65 TYPE 2 DIABETES MELLITUS WITH HYPERGLYCEMIA, WITH LONG-TERM CURRENT USE OF INSULIN (H): ICD-10-CM

## 2023-04-06 DIAGNOSIS — Z79.899 ENCOUNTER FOR LONG-TERM (CURRENT) USE OF MEDICATIONS: ICD-10-CM

## 2023-04-06 DIAGNOSIS — R51.9 NONINTRACTABLE EPISODIC HEADACHE, UNSPECIFIED HEADACHE TYPE: ICD-10-CM

## 2023-04-06 DIAGNOSIS — F43.23 ADJUSTMENT DISORDER WITH MIXED ANXIETY AND DEPRESSED MOOD: ICD-10-CM
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
## 2023-04-06 PROBLEM — I26.99 PULMONARY EMBOLISM (H): Status: RESOLVED | Noted: 2022-11-07 | Resolved: 2023-04-06

## 2023-04-06 LAB
ALBUMIN SERPL BCG-MCNC: 4.7 G/DL (ref 3.5–5.2)
ALP SERPL-CCNC: 87 U/L (ref 40–129)
ALT SERPL W P-5'-P-CCNC: 19 U/L (ref 10–50)
AMYLASE SERPL-CCNC: 67 U/L (ref 28–100)
ANION GAP SERPL CALCULATED.3IONS-SCNC: 14 MMOL/L (ref 7–15)
AST SERPL W P-5'-P-CCNC: 19 U/L (ref 10–50)
BASOPHILS # BLD AUTO: 0.1 10E3/UL (ref 0–0.2)
BASOPHILS NFR BLD AUTO: 1 %
BILIRUB SERPL-MCNC: 0.5 MG/DL
BUN SERPL-MCNC: 12.2 MG/DL (ref 6–20)
CALCIUM SERPL-MCNC: 9.9 MG/DL (ref 8.6–10)
CHLORIDE SERPL-SCNC: 105 MMOL/L (ref 98–107)
CHOLEST SERPL-MCNC: 206 MG/DL
CK SERPL-CCNC: 114 U/L (ref 39–308)
CREAT SERPL-MCNC: 0.7 MG/DL (ref 0.67–1.17)
CREAT UR-MCNC: 172 MG/DL
DEPRECATED HCO3 PLAS-SCNC: 26 MMOL/L (ref 22–29)
EOSINOPHIL # BLD AUTO: 0.5 10E3/UL (ref 0–0.7)
EOSINOPHIL NFR BLD AUTO: 5 %
ERYTHROCYTE [DISTWIDTH] IN BLOOD BY AUTOMATED COUNT: 13.3 % (ref 10–15)
FASTING STATUS PATIENT QL REPORTED: NO
FASTING STATUS PATIENT QL REPORTED: NO
GFR SERPL CREATININE-BSD FRML MDRD: >90 ML/MIN/1.73M2
GLUCOSE SERPL-MCNC: 141 MG/DL (ref 70–99)
GLUCOSE SERPL-MCNC: 141 MG/DL (ref 70–99)
GLUCOSE SERPL-MCNC: 144 MG/DL (ref 70–99)
HCT VFR BLD AUTO: 45.2 % (ref 40–53)
HDLC SERPL-MCNC: 46 MG/DL
HGB BLD-MCNC: 15.2 G/DL (ref 13.3–17.7)
LDLC SERPL CALC-MCNC: 135 MG/DL
LIPASE SERPL-CCNC: 49 U/L (ref 13–60)
LYMPHOCYTES # BLD AUTO: 2.6 10E3/UL (ref 0.8–5.3)
LYMPHOCYTES NFR BLD AUTO: 26 %
MCH RBC QN AUTO: 29.6 PG (ref 26.5–33)
MCHC RBC AUTO-ENTMCNC: 33.6 G/DL (ref 31.5–36.5)
MCV RBC AUTO: 88 FL (ref 78–100)
MICROALBUMIN UR-MCNC: 53.3 MG/L
MICROALBUMIN/CREAT UR: 30.99 MG/G CR (ref 0–17)
MONOCYTES # BLD AUTO: 0.7 10E3/UL (ref 0–1.3)
MONOCYTES NFR BLD AUTO: 7 %
NEUTROPHILS # BLD AUTO: 6.3 10E3/UL (ref 1.6–8.3)
NEUTROPHILS NFR BLD AUTO: 62 %
NONHDLC SERPL-MCNC: 160 MG/DL
PHOSPHATE SERPL-MCNC: 3.3 MG/DL (ref 2.5–4.5)
PLATELET # BLD AUTO: 240 10E3/UL (ref 150–450)
POTASSIUM SERPL-SCNC: 4 MMOL/L (ref 3.4–5.3)
PROT SERPL-MCNC: 7.1 G/DL (ref 6.4–8.3)
RBC # BLD AUTO: 5.14 10E6/UL (ref 4.4–5.9)
SODIUM SERPL-SCNC: 145 MMOL/L (ref 136–145)
TRIGL SERPL-MCNC: 123 MG/DL
WBC # BLD AUTO: 10.2 10E3/UL (ref 4–11)

## 2023-04-06 PROCEDURE — 82947 ASSAY GLUCOSE BLOOD QUANT: CPT | Performed by: NURSE PRACTITIONER

## 2023-04-06 PROCEDURE — 85025 COMPLETE CBC W/AUTO DIFF WBC: CPT | Performed by: NURSE PRACTITIONER

## 2023-04-06 PROCEDURE — 36415 COLL VENOUS BLD VENIPUNCTURE: CPT | Performed by: NURSE PRACTITIONER

## 2023-04-06 PROCEDURE — 82043 UR ALBUMIN QUANTITATIVE: CPT | Performed by: NURSE PRACTITIONER

## 2023-04-06 PROCEDURE — 82570 ASSAY OF URINE CREATININE: CPT | Performed by: NURSE PRACTITIONER

## 2023-04-06 PROCEDURE — 82150 ASSAY OF AMYLASE: CPT | Performed by: NURSE PRACTITIONER

## 2023-04-06 PROCEDURE — 99214 OFFICE O/P EST MOD 30 MIN: CPT | Performed by: NURSE PRACTITIONER

## 2023-04-06 PROCEDURE — 80061 LIPID PANEL: CPT | Performed by: NURSE PRACTITIONER

## 2023-04-06 PROCEDURE — 80053 COMPREHEN METABOLIC PANEL: CPT | Performed by: NURSE PRACTITIONER

## 2023-04-06 PROCEDURE — 83690 ASSAY OF LIPASE: CPT | Performed by: NURSE PRACTITIONER

## 2023-04-06 PROCEDURE — 82550 ASSAY OF CK (CPK): CPT | Performed by: NURSE PRACTITIONER

## 2023-04-06 PROCEDURE — 83036 HEMOGLOBIN GLYCOSYLATED A1C: CPT | Performed by: NURSE PRACTITIONER

## 2023-04-06 PROCEDURE — 84100 ASSAY OF PHOSPHORUS: CPT | Performed by: NURSE PRACTITIONER

## 2023-04-06 RX ORDER — ALBUTEROL SULFATE 90 UG/1
2 AEROSOL, METERED RESPIRATORY (INHALATION) EVERY 6 HOURS PRN
Qty: 18 G | Refills: 0 | Status: SHIPPED | OUTPATIENT
Start: 2023-04-06

## 2023-04-06 RX ORDER — PROPRANOLOL HYDROCHLORIDE 20 MG/1
20 TABLET ORAL 2 TIMES DAILY
Qty: 180 TABLET | Refills: 1 | Status: SHIPPED | OUTPATIENT
Start: 2023-04-06 | End: 2023-08-16

## 2023-04-06 RX ORDER — METFORMIN HCL 500 MG
TABLET, EXTENDED RELEASE 24 HR ORAL
Qty: 270 TABLET | Refills: 1 | Status: ON HOLD | OUTPATIENT
Start: 2023-04-06 | End: 2023-05-20

## 2023-04-06 RX ORDER — LISINOPRIL 40 MG/1
40 TABLET ORAL DAILY
Qty: 90 TABLET | Refills: 1 | Status: SHIPPED | OUTPATIENT
Start: 2023-04-06 | End: 2023-08-10

## 2023-04-06 RX ORDER — HYDROCHLOROTHIAZIDE 25 MG/1
25 TABLET ORAL DAILY
Qty: 90 TABLET | Refills: 1 | Status: SHIPPED | OUTPATIENT
Start: 2023-04-06 | End: 2023-08-10

## 2023-04-06 RX ORDER — CITALOPRAM HYDROBROMIDE 20 MG/1
20 TABLET ORAL EVERY EVENING
Qty: 90 TABLET | Refills: 1 | Status: SHIPPED | OUTPATIENT
Start: 2023-04-06 | End: 2023-10-06

## 2023-04-06 ASSESSMENT — ANXIETY QUESTIONNAIRES
IF YOU CHECKED OFF ANY PROBLEMS ON THIS QUESTIONNAIRE, HOW DIFFICULT HAVE THESE PROBLEMS MADE IT FOR YOU TO DO YOUR WORK, TAKE CARE OF THINGS AT HOME, OR GET ALONG WITH OTHER PEOPLE: SOMEWHAT DIFFICULT
8. IF YOU CHECKED OFF ANY PROBLEMS, HOW DIFFICULT HAVE THESE MADE IT FOR YOU TO DO YOUR WORK, TAKE CARE OF THINGS AT HOME, OR GET ALONG WITH OTHER PEOPLE?: SOMEWHAT DIFFICULT
6. BECOMING EASILY ANNOYED OR IRRITABLE: NEARLY EVERY DAY
1. FEELING NERVOUS, ANXIOUS, OR ON EDGE: SEVERAL DAYS
2. NOT BEING ABLE TO STOP OR CONTROL WORRYING: SEVERAL DAYS
GAD7 TOTAL SCORE: 11
4. TROUBLE RELAXING: SEVERAL DAYS
3. WORRYING TOO MUCH ABOUT DIFFERENT THINGS: SEVERAL DAYS
5. BEING SO RESTLESS THAT IT IS HARD TO SIT STILL: NEARLY EVERY DAY
GAD7 TOTAL SCORE: 11
7. FEELING AFRAID AS IF SOMETHING AWFUL MIGHT HAPPEN: SEVERAL DAYS
7. FEELING AFRAID AS IF SOMETHING AWFUL MIGHT HAPPEN: SEVERAL DAYS

## 2023-04-06 ASSESSMENT — PAIN SCALES - GENERAL: PAINLEVEL: MILD PAIN (3)

## 2023-04-06 NOTE — PROGRESS NOTES
SUBJECTIVE:   CC: Connor is an 42 year old who presents for preventative health visit.       4/6/2023    12:54 PM   Additional Questions   Roomed by Mckenzie Hancock   Accompanied by self         4/6/2023    12:54 PM   Patient Reported Additional Medications   Patient reports taking the following new medications Methadone, Celebrex, brigatinib     Healthy Habits:     Taking medications regularly:  1    PHQ-2 Total Score: 1  History of Present Illness       Reason for visit:  Routine    He eats 0-1 servings of fruits and vegetables daily.He consumes 0 sweetened beverage(s) daily.He exercises with enough effort to increase his heart rate 60 or more minutes per day.  He exercises with enough effort to increase his heart rate 3 or less days per week. He is missing 1 dose(s) of medications per week.  Today's SADIA-7 Score: 11          Has not used insulin in over 1 month.  At that time he was told to stop his lantus and continue with his mealtime insulin only.  Since that time he has not checked his blood sugars since that time.  He has drastically decreased sugar intake.  Changed to diet pop.  Weight is stable.  Appetite is stable.  No urinary frequency.  Still taking metformin.  Following with palliative care for pain management.  He reports this is controlled and allows him to continue working.  He has been out of his blood pressure medications for about 1 week.  He denies chest pain or LE edema.  He has tightness in his chest at baseline.      Today's PHQ-2 Score:       4/6/2023    12:48 PM   PHQ-2 ( 1999 [employer])   Q1: Little interest or pleasure in doing things 0   Q2: Feeling down, depressed or hopeless 1   PHQ-2 Score 1   Q1: Little interest or pleasure in doing things Not at all   Q2: Feeling down, depressed or hopeless Several days   PHQ-2 Score 1       Have you ever done Advance Care Planning? (For example, a Health Directive, POLST, or a discussion with a medical provider or your loved ones about your wishes): No,  advance care planning information given to patient to review.  Patient declined advance care planning discussion at this time.    Social History     Tobacco Use     Smoking status: Never     Smokeless tobacco: Never   Vaping Use     Vaping status: Never Used     Passive vaping exposure: Yes   Substance Use Topics     Alcohol use: Yes     Alcohol/week: 0.0 standard drinks of alcohol     Comment: social              View : No data to display.                   View : No data to display.                Last PSA:   PSA   Date Value Ref Range Status   08/10/2018 0.62 0 - 4 ug/L Final     Comment:     Assay Method:  Chemiluminescence using Siemens Vista analyzer       Reviewed orders with patient. Reviewed health maintenance and updated orders accordingly - Yes  Patient Active Problem List   Diagnosis     CARDIOVASCULAR SCREENING; LDL GOAL LESS THAN 160     Low back pain     Insomnia     Obesity     HTN, goal below 140/90     Anxiety     GERD (gastroesophageal reflux disease)     Atypical chest pain     Intractable chronic migraine without aura     Type 2 diabetes mellitus without complication (H)     Adjustment disorder with mixed anxiety and depressed mood     Lateral epicondylitis of right elbow     Cellulitis of hand, right     Morbid obesity (H)     Pleural effusion on right     Malignant neoplasm of lower lobe of right lung (H)     Brain metastasis     Complication of chest tube, initial encounter     Severe sepsis (H)     Surgery, elective     Seizure (H)     Hyperglycemia     Radiation therapy induced brain necrosis     Other specified nutritional anemias     Medical marijuana use     Pulmonary embolism (H)     Past Surgical History:   Procedure Laterality Date     BRONCHOSCOPY RIGID OR FLEXIBLE W/TRANSENDOSCOPIC ENDOBRONCHIAL ULTRASOUND GUIDED Bilateral 1/26/2022    Procedure: Right BRONCHOSCOPY, FIBEROPTIC, endobronchial ultrasound, pleural biopsy;  Surgeon: Dallin Agrawal MD;  Location: [facility] OR     INJECT BLOCK  MEDIAL BRANCH CERVICAL/THORACIC/LUMBAR       INSERT CHEST TUBE Right 2/16/2022    Procedure: INSERTION, CATHETER, INTERCOSTAL, FOR DRAINAGE;  Surgeon: Dallin Agrawal MD;  Location: [facility] GI     INSERT CHEST TUBE Right 3/9/2022    Procedure: INSERTION, CATHETER, INTERCOSTAL, FOR DRAINAGE;  Surgeon: Sushila Antonio MD;  Location: [facility] GI     IR CHEST TUBE REMOVAL TUNNELED RIGHT  4/2/2022     OPTICAL TRACKING SYSTEM CRANIOTOMY, EXCISE TUMOR, COMBINED Left 6/28/2022    Procedure: Left stealth craniotomy for tumor resection with motor mapping;  Surgeon: Stephen Moran MD;  Location:  OR     ORTHOPEDIC SURGERY      Ganesh. Rotator cuff repair.     PLEUROSCOPY N/A 1/26/2022    Procedure: Pleuroscopy with Pleural Biopsy;  Surgeon: Dallin Agrawal MD;  Location:  OR       Social History     Tobacco Use     Smoking status: Never     Smokeless tobacco: Never   Vaping Use     Vaping status: Never Used     Passive vaping exposure: Yes   Substance Use Topics     Alcohol use: Yes     Alcohol/week: 0.0 standard drinks of alcohol     Comment: social     Family History   Problem Relation Age of Onset     Unknown/Adopted Mother      Uterine Cancer Mother      Unknown/Adopted Father      Unknown/Adopted Maternal Grandmother      Unknown/Adopted Maternal Grandfather      Stomach Cancer Maternal Grandfather      Unknown/Adopted Paternal Grandmother      Unknown/Adopted Paternal Grandfather      Melanoma Maternal Uncle            Reviewed and updated as needed this visit by clinical staff   Tobacco  Allergies  Meds              Reviewed and updated as needed this visit by Provider                     Review of Systems  CONSTITUTIONAL: NEGATIVE for fever, chills, change in weight  INTEGUMENTARY/SKIN: NEGATIVE for worrisome rashes, moles or lesions  EYES: NEGATIVE for vision changes or irritation  ENT: NEGATIVE for ear, mouth and throat problems  RESP: NEGATIVE for significant cough or SOB  CV: NEGATIVE for chest pain, palpitations  "or peripheral edema  GI: NEGATIVE for nausea, abdominal pain, heartburn, or change in bowel habits   male: negative for dysuria, hematuria, decreased urinary stream, erectile dysfunction, urethral discharge  MUSCULOSKELETAL: NEGATIVE for significant arthralgias or myalgia  NEURO: NEGATIVE for weakness, dizziness or paresthesias  PSYCHIATRIC: NEGATIVE for changes in mood or affect    OBJECTIVE:   BP (!) 181/119 (BP Location: Right arm, Patient Position: Sitting, Cuff Size: Adult Large)   Pulse 67   Temp 97.6  F (36.4  C) (Oral)   Resp 20   Ht 1.753 m (5' 9.02\")   Wt 100.1 kg (220 lb 11.2 oz)   SpO2 94%   BMI 32.58 kg/m      Physical Exam  GENERAL: healthy, alert and no distress  EYES: Eyes grossly normal to inspection  HENT: ear canals and TM's normal, nose and mouth without ulcers or lesions  NECK: no adenopathy, no asymmetry, masses, or scars and thyroid normal to palpation  RESP: tight throughout with intermittent wheezing.  CV: regular rate and rhythm, normal S1 S2, no S3 or S4, no murmur, click or rub, no peripheral edema and peripheral pulses strong  ABDOMEN: soft, nontender, no hepatosplenomegaly, no masses and bowel sounds normal  NEURO: Normal strength and tone, mentation intact and speech normal  PSYCH: mentation appears normal, affect normal/bright    Diagnostic Test Results:  Labs reviewed in Epic    ASSESSMENT/PLAN:   (I10) Primary hypertension  Comment: above goal, out of medications.  Asymptomatic.  Plan: hydrochlorothiazide (HYDRODIURIL) 25 MG tablet        Monitor for any change in symptoms, ED if this occurs.  Restart medications now.   lisinopril (ZESTRIL) 40 MG tablet, propranolol         (INDERAL) 20 MG tablet      (F43.23) Adjustment disorder with mixed anxiety and depressed mood  Comment: increased stressors, but overall helpful.  Plan: citalopram (CELEXA) 20 MG tablet        Refilled.    (E11.65,  Z79.4) Type 2 diabetes mellitus with hyperglycemia, with long-term current use of " "insulin (H)  Comment: recheck a1c today.  Was having low sugars, no insulin recently.  Plan: Lipid panel reflex to direct LDL Non-fasting,         Albumin Random Urine Quantitative with Creat         Ratio, metFORMIN (GLUCOPHAGE XR) 500 MG 24 hr         tablet, Hemoglobin A1c            (R51.9) Nonintractable episodic headache, unspecified headache type  Comment:   Plan: propranolol (INDERAL) 20 MG tablet            (C34.90) Non-small cell lung cancer, unspecified laterality (H)  Comment: following with oncology.  Plan: albuterol (PROAIR HFA/PROVENTIL HFA/VENTOLIN         HFA) 108 (90 Base) MCG/ACT inhaler            (E66.01) Morbid obesity (H)  Comment:   Plan:     (R56.9) Seizure (H)  Comment: following with oncology and neurology.  Plan: monitor.    (Z79.899) Encounter for long-term (current) use of medications  Comment:   Plan:     (C34.31) Primary malignant neoplasm of right lower lobe of lung (H)  Comment: following with oncology.  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  Plan:           COUNSELING:   Reviewed preventive health counseling, as reflected in patient instructions      BMI:   Estimated body mass index is 32.58 kg/m  as calculated from the following:    Height as of this encounter: 1.753 m (5' 9.02\").    Weight as of this encounter: 100.1 kg (220 lb 11.2 oz).         He reports that he has never smoked. He has never used smokeless tobacco.        Radha Shetty, JERRY CNP  [facility]  "

## 2023-04-07 ENCOUNTER — MYC REFILL (OUTPATIENT)
Dept: PALLIATIVE CARE | Facility: CLINIC | Age: 43
End: 2023-04-07
Payer: MEDICAID

## 2023-04-07 DIAGNOSIS — C34.31 MALIGNANT NEOPLASM OF LOWER LOBE OF RIGHT LUNG (H): ICD-10-CM

## 2023-04-07 DIAGNOSIS — I67.89 RADIATION THERAPY INDUCED BRAIN NECROSIS: Primary | ICD-10-CM

## 2023-04-07 DIAGNOSIS — G89.3 CANCER ASSOCIATED PAIN: ICD-10-CM
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
## 2023-04-07 LAB — HBA1C MFR BLD: 8.3 % (ref 0–5.6)

## 2023-04-07 RX ORDER — OXYCODONE HYDROCHLORIDE 5 MG/1
5-10 TABLET ORAL EVERY 4 HOURS PRN
Qty: 60 TABLET | Refills: 0 | Status: SHIPPED | OUTPATIENT
Start: 2023-04-07 | End: 2023-04-30

## 2023-04-07 NOTE — TELEPHONE ENCOUNTER
[facility]: Palliative Care                                                                                        Received mychart message from patient requesting refill of oxycodone.     Last refill: 3/17/2023  Last office visit: 4/4/2023  Scheduled for follow up 5/16/2023    Will route request to MD for review.     Reviewed [place]  Report.       Signature:  Clover Mariscal, VINHN, RN, OCN

## 2023-04-20 ENCOUNTER — HOSPITAL ENCOUNTER (OUTPATIENT)
Dept: CT IMAGING | Facility: CLINIC | Age: 43
Discharge: HOME | End: 2023-04-20
Attending: STUDENT IN AN ORGANIZED HEALTH CARE EDUCATION/TRAINING PROGRAM
Payer: COMMERCIAL

## 2023-04-20 ENCOUNTER — HOSPITAL ENCOUNTER (OUTPATIENT)
Dept: MRI IMAGING | Facility: CLINIC | Age: 43
Discharge: HOME | End: 2023-04-20
Attending: STUDENT IN AN ORGANIZED HEALTH CARE EDUCATION/TRAINING PROGRAM
Payer: COMMERCIAL

## 2023-04-20 ENCOUNTER — MYC MEDICAL ADVICE (OUTPATIENT)
Dept: ONCOLOGY | Facility: CLINIC | Age: 43
End: 2023-04-20
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
## 2023-04-20 DIAGNOSIS — C34.31 MALIGNANT NEOPLASM OF LOWER LOBE OF RIGHT LUNG (H): ICD-10-CM

## 2023-04-20 DIAGNOSIS — I67.89 RADIATION THERAPY INDUCED BRAIN NECROSIS: Primary | ICD-10-CM

## 2023-04-20 DIAGNOSIS — C79.31 MALIGNANT NEOPLASM METASTATIC TO BRAIN (H): ICD-10-CM

## 2023-04-20 DIAGNOSIS — I67.89 RADIATION THERAPY INDUCED BRAIN NECROSIS: ICD-10-CM
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
## 2023-04-20 PROCEDURE — 258N000003 HC RX IP 258 OP 636: Performed by: STUDENT IN AN ORGANIZED HEALTH CARE EDUCATION/TRAINING PROGRAM

## 2023-04-20 PROCEDURE — 70553 MRI BRAIN STEM W/O & W/DYE: CPT

## 2023-04-20 PROCEDURE — A9585 GADOBUTROL INJECTION: HCPCS | Performed by: STUDENT IN AN ORGANIZED HEALTH CARE EDUCATION/TRAINING PROGRAM

## 2023-04-20 PROCEDURE — 255N000002 HC RX 255 OP 636: Performed by: STUDENT IN AN ORGANIZED HEALTH CARE EDUCATION/TRAINING PROGRAM

## 2023-04-20 PROCEDURE — 250N000011 HC RX IP 250 OP 636: Performed by: STUDENT IN AN ORGANIZED HEALTH CARE EDUCATION/TRAINING PROGRAM

## 2023-04-20 PROCEDURE — 74177 CT ABD & PELVIS W/CONTRAST: CPT

## 2023-04-20 RX ORDER — GADOBUTROL 604.72 MG/ML
10 INJECTION INTRAVENOUS ONCE
Status: COMPLETED | OUTPATIENT
Start: 2023-04-20 | End: 2023-04-20

## 2023-04-20 RX ORDER — IOPAMIDOL 755 MG/ML
500 INJECTION, SOLUTION INTRAVASCULAR ONCE
Status: COMPLETED | OUTPATIENT
Start: 2023-04-20 | End: 2023-04-20

## 2023-04-20 RX ORDER — DEXAMETHASONE 4 MG/1
6 TABLET ORAL
Qty: 30 TABLET | Refills: 1 | Status: SHIPPED | OUTPATIENT
Start: 2023-04-20 | End: 2023-04-28

## 2023-04-20 RX ADMIN — IOPAMIDOL 100 ML: 755 INJECTION, SOLUTION INTRAVENOUS at 08:05

## 2023-04-20 RX ADMIN — SODIUM CHLORIDE 65 ML: 9 INJECTION, SOLUTION INTRAVENOUS at 08:05

## 2023-04-20 RX ADMIN — GADOBUTROL 10 ML: 604.72 INJECTION INTRAVENOUS at 08:37

## 2023-04-21 NOTE — TELEPHONE ENCOUNTER
"Oncology Nurse Triage  Situation:   Connor reporting the following symptoms: headache    Background:   Treating Provider: Dr. Persaud/Chelsea Villegas    Date of last office visit: 3/23/23    Recent Treatments:  2/23/2022- current: Brigatinib. Dose reduced to 60 mg due to cough after two days of 90 mg daily   3/2/22- 12/2022 Alectinib 300 mg BID (Dose reduced to 450 mg BID from 600 mg BID due to grade 3 myalgias 3/21/22, again reduced to 300 mg BID 9/28/22)  2/15/22- GK to 11 briain lesions  6/28/22- Craniotomy, resection  9/28/22-10/26- Bevacizumab for radiation necrosis (stopped due to PE)    Assessment:     Onset: 2 weeks  Pt reports he talked w/ Dr. Persaud last night, who prescribed Dexamethasone 6mg PO qAM and pt has appt with him on Monday 4/24. Pt states headaches feel like something is \"pushing\" on his head, rates 5/10, states it is a dull aching pain and is constantly there. He states changing positions does not seem to help, ice and sleep doesn't help. Pt has taking PRN Oxycodone with no relief, has not taken any Ibuprofen or Tylenol. He has been stretching his neck without relief.   Pt expressed he is eager to talk with dr about results of MRI yesterday, said he can wait until Monday's appt.     Recommendations:   Writer reviewed dosing of dexamethasone, recommend he take in morning with something to eat, as it does give energy and make effect sleep if he takes too late in day. Pt states he has been previously prescribed steroid, said he plans to go  from pharmacy on his break either at 1030 or 12pm. Writer reviewed side effects of jitteriness, increase appetite, puffy face, reviewed benefits of dex of helpful for swelling, pain, gives energy. Writer recommended if pain worsens or cognitive status changes, to go to ED to be evaluated, especially going into the weekend. Pt voiced understanding.     Routed to Dr. Persaud and Jamari, RNCC.         "

## 2023-04-24 ENCOUNTER — VIRTUAL VISIT (OUTPATIENT)
Dept: ONCOLOGY | Facility: CLINIC | Age: 43
End: 2023-04-24
Attending: STUDENT IN AN ORGANIZED HEALTH CARE EDUCATION/TRAINING PROGRAM
Payer: COMMERCIAL

## 2023-04-24 DIAGNOSIS — C34.31 MALIGNANT NEOPLASM OF LOWER LOBE OF RIGHT LUNG (H): ICD-10-CM

## 2023-04-24 PROCEDURE — 99215 OFFICE O/P EST HI 40 MIN: CPT | Mod: VID | Performed by: STUDENT IN AN ORGANIZED HEALTH CARE EDUCATION/TRAINING PROGRAM

## 2023-04-24 NOTE — PROGRESS NOTES
MEDICAL ONCOLOGY FOLLOW UP NOTE    PATIENT NAME: Connor Emerson  ENCOUNTER DATE: 4/24/2023    Care Team  Primary Oncologist: Bassam Persaud MD    REASON FOR CURRENT VISIT: F/u of lung cancer    HISTORY OF PRESENT ILLNESS:  Mr. Connor Emerson is a 42 year old  male who is a non-smoker with PMHx of T2DM, HTN with metastatic NSCLC comes for follow up     Oncologic Hx:    Diagnosis:   Stage IV NSCLC, Rt lung adenocarcinoma with metastasis to pleura, mediastinum , rt pleural effusion and brain diagnosed 1/2022 (AJCC 8th edition)  PD-L1 TPS 2-3% by [place]   NGS [facility] panel-EML4:ALK rearragement  NGS Guardant- GNAS R201H, KRAS K5E- No ALK    Treatment:    2/23/2022- current: Brigatinib. Dose reduced to 60 mg due to cough after two days of 90 mg daily -->now back on 90 mg    3/2/22- 12/2022 Alectinib 300 mg BID (Dose reduced to 450 mg BID from 600 mg BID due to grade 3 myalgias 3/21/22, again reduced to 300 mg BID 9/28/22)    )  Past:  2/15/22- GK to 11 brain lesions  6/28/22- Craniotomy, resection  9/28/22-10/26- Bevacizumab for radiation necrosis (stopped due to PE)      Intent of treatment: Palliative    Oncologic course:  1/19/22 to 1/22/22-Admitted to [facility] for 2 week progressive SOB secondary to have large rt sided pleural effusion, needing thoracentesis x2 (1.7L and 2.0 L removed), cytology positive for malignancy, adenocarcinoma.   1/26/22- Rt pleural mass biopsy-Dr. Agrawal--POSITIVE FOR ADENOCARCINOMA CONSISTENT WITH LUNG PRIMARY, admixed with mesothelial hyperplasia and inflammatory infiltrate (+ TTF-1 and CK 7;  negative  p40, calretinin and WT-1. PAX8 immunostain focal +). 4th thoracentesis done simultaneously - 3L approx removed.   2/1/22- PET/CT-Right lower lobe central infiltrative FDG avid 8.2 x 9.6 cm mass representing a primary lung adenocarcinoma. Ipsilateral right perihilar, bilateral pretracheal, subcarinal and superior mediastinal michele metastases. Contralateral mildly FDG avid few lung nodules are  suspicious for contralateral metastasis. At least 3 intracranial metastases in the right frontal lobe, left frontal lobe and left cerebellar hemisphere. Nonspecific mild diffuse bone marrow uptake. Further evaluation with a spine MRI could be considered to rule out early marrow infiltration. This could also be seen with red marrow conversion.  2/5/22-  Brain MRI- At least 9 intracranial metastases as detailed above. The dominant lesions involving the orbital right frontal lobe, the posterior left middle frontal gyrus, anterior right temporal lobe and in the left cerebellar hemisphere have surrounding moderate vasogenic type edema.  2/15/22- Saw Dr. Arango from Rad Onc- Rcd GK to 12 lesion in bran  2/16/22- Pleurex placement   3/2/22- Started Alectinib 600 mg BID  3/21/22- Dose reduced to 450 mg BID due to grade 3 myalgias and fatigue  4/2/22 to 4/5/22- Admitted at [facility] for- Severe sepsis due to MSSA infection of right PleurX catheter s/p removal- He presented with onset of pain at tube site starting 4/1; at arrival was tachycardic with leukocytosis (22.7) and elevated lactic acid (2.9).  CT chest showed fluid and stranding tracking outside the pleural space into chest wall along pleural catheter.  IR was consulted and removed catheter 4/2 with report of pustular drainage and tip culture growing MSSA.  Thoracic Surg was consulted who felt no surgical indication necessary given minimal pleural fluid and lack of any signs of abscess.  Initially treated with broad spectrum coverage for sepsis, narrowed to Ancef once sensitivities returned with plan to transition to cefadroxil for an additional 10 days at discharge per ID. Held drug 4/2 to 4/11 5/2/22- CT CAP- Overall, positive response to therapy with decreased size of right lower lobe and right pleural-based masses, pulmonary metastases, hilar and mediastinal lymphadenopathy. However, a single right posterior pleural-based mass has slightly increased in size  since 2/24/2022. No metastatic disease in the abdomen and pelvis. Right Pleurx catheter has been removed. Trace right pleural effusion and right basilar atelectasis.  5/2/22- Brain MRI- The previously demonstrated brain metastases are mildly diminished in size versus to 2/5/2022. The degree of edema is also diminished but not completely resolved. Probable trace amounts of intralesional bleeding demonstrated on the gradient sequence within the metastases. No definite new metastasis or progressive mass effect. No hydrocephalus or infarct.    6/15/22 to 6/17/22- Admitted at [facility]-with aphasia and word finding difficulty over last few weeks.  He presented to [facility] ED on 6/10 for evaluation of his symptoms. MRI brain showed multiple intracranial metastases, with interval enlargement of the dominant lesion within the left frontal lobe and increased surrounding vasogenic edema with 2 mm rightward shift of the septum pellucidum. Due to his worsening anxiety, he left AMA. His symptoms continued to progress to where he could not write at work so he decided to go to the ED for re-evaluation and treatment. Evaluated by NSGY, Rad Onc (radiaiton necrosis vs tumor progression).  6/16/22- MR Brain (6/16) shows multiple intracranial metastases, with interval enlargement  of the dominant lesion within the left frontal lobe and increased surrounding vasogenic edema with 2 mm rightward shift of the septum pellucidum.  6/16/22- - CT CAP shows slightly decreased size of right lower lobe and right pleural-based masses. No new pulmonary nodules or lymphadenopathy; No evidence of metastatic disease in the abdomen or pelvis.   6/28/22 to 6/30/22- Admitted at [facility]- Elective left Stealth craniotomy with resection of brain tumor due to ongoing symptoms. No intraoperative complications. EBL 50 ml.  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  7/5/22- Ct CAP- Right lower lobe low-density nodules are not significantly changed. A small  left upper lobe pulmonary nodule is also unchanged. Trace pleural fluid on the right has increased slightly. No convincing evidence for metastatic disease in the abdomen or pelvis.  7/18/22 to 7/19/22- Admitted to [facility] for seizure- Reportedly was only taking once Keppra instead of twice daily. Also resumed on dexamethasone 2 mg daily  8/1/22- Brain MRI- Redemonstrated postsurgical changes status post left frontoparietal Craniotomy. Interval increase in size of the dominant ring-enhancing lesion in the left posterior superior frontal lobe with increased moderate surrounding vasogenic edema and local mass effect resulting in narrowing of the supratentorial ventricular system. No significant midline shift/herniation at this time  8/1/22- Dex was increased to 4 mg daily by Dr. Moran  9/1/22- CT Chest- Near resolution of previously seen right pleural nodule. Stable right lower lobe pulmonary nodule  9/28/22- Bevacizumab for radiation necrosis  10/26/22- Bevacizumab   10/26/22- Ct CAP- Stable posterior medial right lower lobe 1.9 x 1.1 cm nodule series 8 image 176. Adjacent stable scarring and atelectasis. The previously noted pleural nodule posteriorly on the right is not currently clearly identified. Stable left upper lobe 0.3 cm nodule image 56  10/26/22- Brain MRI- Overall improved appearance of multiple intracranial metastases with near resolution of associated edema and diminished enhancement and size of multiple residual lesions. No definite new or progressive metastasis.  11/7/22 to 11/9/22- Admitted for PE and HTN urgency- Small pulmonary embolism in the right lower lobe pulmonary artery. started on Lovenox, Brain MRI neg for PRES.  12/29/22- ED visit- bilateral hip pain, pain in shoulders, knuckles, knees, and ankles- holding alectinib since 12/20/22 1/6/23- Ct CAP- Mild groundglass nodularity in the left upper lobe is new since the previous exam, and may be infectious in etiology. No other significant interval  change. Pulmonary nodules are not significantly changed.  1/6/23- Brain MRI- Stable to diminished sequelae of intracranial metastasis and treatment changes. No new or progressive metastasis. No superimposed acute intracranial finding.    2/23/2022- Start Brigatinib. Dose reduced to 60 mg due to cough after two days of 90 mg daily -  3/23/23-Brigatinib  (increase to 90 mg)  4/20/23- CT CAP- Stable- Previously noted mild groundglass nodularity in the left upper lobe has resolved.Pulmonary nodules are unchanged.Trace amount pleural fluid on the right has decreased slightly.  4/20/23- Brain MRI- Possile progression- Largest metastasis within the right frontal lobe has increased in size with worsening peripheral nodular enhancement and worsening vasogenic edema contributing to new right to left midline shift.  Multiple new/enlarging metastases scattered throughout the cerebral hemispheres and cerebellum.    He works as a maintenance manger for apartment complexes    Interval Hx:  Connor is here to follow up. We discussed his MR and CT results. He is currently on 90 mg brigatinib and tolerating it well.His joints aches and pains are only minor and he does not have any breathign issues.  He started taking the dexamethasone last week and noticed that his headaches resolved soon after. He denies any new neurologic symptoms. Continues to work full time.    ECOG PS 0    REVIEW OF SYSTEMS: 14 point ROS negative other than the symptoms noted above in the HPI.    Wt Readings from Last 4 Encounters:   04/06/23 100.1 kg (220 lb 11.2 oz)   01/09/23 97.8 kg (215 lb 11.2 oz)   12/29/22 97.5 kg (215 lb)   11/22/22 99.7 kg (219 lb 12.8 oz)      Review of Systems:  A comprehensive ROS was performed and found to be negative or non-contributory with the exception of that noted in the HPI above.    Past Medical History:  GERD  Hypertension, not on medication  Type 2 diabetes mellitus, not on medications currently, previously on  Metformin    Past Surgical History:  Past Surgical History:   Procedure Laterality Date     BRONCHOSCOPY RIGID OR FLEXIBLE W/TRANSENDOSCOPIC ENDOBRONCHIAL ULTRASOUND GUIDED Bilateral 2022    Procedure: Right BRONCHOSCOPY, FIBEROPTIC, endobronchial ultrasound, pleural biopsy;  Surgeon: Dallin Agrawal MD;  Location:  OR     INJECT BLOCK MEDIAL BRANCH CERVICAL/THORACIC/LUMBAR       INSERT CHEST TUBE Right 2022    Procedure: INSERTION, CATHETER, INTERCOSTAL, FOR DRAINAGE;  Surgeon: Dallin Agrawal MD;  Location: [facility]     INSERT CHEST TUBE Right 3/9/2022    Procedure: INSERTION, CATHETER, INTERCOSTAL, FOR DRAINAGE;  Surgeon: Sushila Antonio MD;  Location: [facility]     IR CHEST TUBE REMOVAL TUNNELED RIGHT  2022     OPTICAL TRACKING SYSTEM CRANIOTOMY, EXCISE TUMOR, COMBINED Left 2022    Procedure: Left stealth craniotomy for tumor resection with motor mapping;  Surgeon: Stephen Moran MD;  Location:  OR     ORTHOPEDIC SURGERY      Ganesh. Rotator cuff repair.     PLEUROSCOPY N/A 2022    Procedure: Pleuroscopy with Pleural Biopsy;  Surgeon: Dallin Agrawal MD;  Location:  OR       Social History:  Lives with wife and 4 kids in [place]. Works as a  for an apartment complex in [place]. Exposure to household chemicals and . No significant exposure to asbestos. No signal exposure to benzene or similar chemicals. No significant smoking history-states that he smoked 1 to 2 cigarettes occasionally per month for about 2 years in college, non-smoking since then. No significant alcohol use history. No other recreational substances. Good support system. Kids are 23, 19, 17 and 13.    Family History  Significant history for cancers on maternal side. Mother  of uterine cancer. 2 maternal uncles have possible metastatic melanoma.    Outpatient Medications:  Current Outpatient Medications   Medication     albuterol (PROAIR HFA/PROVENTIL HFA/VENTOLIN HFA) 108 (90  Base) MCG/ACT inhaler     alcohol swab prep pads     blood glucose (NO BRAND SPECIFIED) test strip     blood glucose calibration (NO BRAND SPECIFIED) solution     blood glucose monitoring (NO BRAND SPECIFIED) meter device kit     brigatinib (ALUNBRIG) 30 MG TABS tablet     celecoxib (CELEBREX) 100 MG capsule     citalopram (CELEXA) 20 MG tablet     Continuous Blood Gluc Sensor (FREESTYLE IRENE 2 SENSOR) MISC     dexamethasone (DECADRON) 4 MG tablet     hydrochlorothiazide (HYDRODIURIL) 25 MG tablet     insulin aspart (NOVOLOG PEN) 100 UNIT/ML pen     insulin glargine (LANTUS PEN) 100 UNIT/ML pen     insulin pen needle (31G X 8 MM) 31G X 8 MM miscellaneous     levETIRAcetam (KEPPRA) 1000 MG tablet     lisinopril (ZESTRIL) 40 MG tablet     metFORMIN (GLUCOPHAGE XR) 500 MG 24 hr tablet     methadone (DOLOPHINE) 5 MG tablet     methocarbamol (ROBAXIN) 500 MG tablet     naloxone (NARCAN) 4 MG/0.1ML nasal spray     oxyCODONE (ROXICODONE) 5 MG tablet     prochlorperazine (COMPAZINE) 10 MG tablet     prochlorperazine (COMPAZINE) 10 MG tablet     propranolol (INDERAL) 20 MG tablet     rivaroxaban ANTICOAGULANT (XARELTO) 20 MG TABS tablet     thin (NO BRAND SPECIFIED) lancets     No current facility-administered medications for this visit.     PHYSICAL EXAMINATION ATTAINABLE DURING VIDEO VISIT:  CONSTITUTIONAL - Pt looks like stated age, pleasant, not in acute distress. Not obese.  NEURO: Oriented to time, person, and places. No tremor.  ENT, MOUTH: Pupils are equal.  Sclerae are anicteric.  Moist oral mucosa. No oral thrush.   NECK:  No jugular venous distention.  No thyroid enlargement.   RESPIRATORY: talk nl, no sob during conversation, no cough.   MUSCULOSKELETAL/EXTREMITIES:  No edema.  No joint deformity. Normal range of motion  SKIN:  No petechiae.  No rash.  No signs of cellulitis.   PSYCHIATRIC: Normal mood and affect. Good memory. Proper insight and judgement.   THE REST OF A COMPREHENSIVE PHYSICIAL EXAM IS DEFERRED  DUE TO COVID-19 PUBLIC HEALTH EMERGENCY RELATED VIDEO VISIT RESCTRICTION.      Labs & Studies: I personally reviewed the following studies:  Most Recent 3 CBC's:  Recent Labs   Lab Test 04/06/23  1400 03/15/23  1432 02/09/23  1929   WBC 10.2 8.9 11.2*   HGB 15.2 14.2 16.7   MCV 88 89 93    224 170     Most Recent 3 BMP's:  Recent Labs   Lab Test 04/06/23  1400 03/15/23  1432 02/09/23  1929    149* 142   POTASSIUM 4.0 4.4 3.8   CHLORIDE 105 109* 103   CO2 26 29 25   BUN 12.2 18.1 18.0   CR 0.70 0.71 0.53*   ANIONGAP 14 11 14   TORY 9.9 9.7 9.5   *  141*  144* 203* 231*    Most Recent 2 LFT's:  Recent Labs   Lab Test 04/06/23  1400 03/15/23  1432   AST 19 22   ALT 19 17   ALKPHOS 87 84   BILITOTAL 0.5 0.5    Most Recent TSH and T4:  Recent Labs   Lab Test 09/12/22  1642   TSH 2.56        ASSESSMENT AND PLAN:  Stage IV NSCLC, Rt lung adenocarcinoma with metastasis to pleura, mediastinum , rt pleural effusion and brain diagnosed 1/2022 (AJCC 8th edition)  PD-L1 TPS 2-3% by [place]   NGS [facility] panel-EML4:ALK rearragement; chr2:[id number], chr2:[id number]  NGS Guardant- GNAS R201H, KRAS K5E- No ALK    He began Alectinib 600 mg BID 3/2/22 and unfortunately developed grade 3 myalgias which have improved with lowering the dose 450 mg BID. He was holding drug 4/2/22 to 4/11/22 due to MSSA infection from pleurex which is removed. Resumed at 450 mg BID. Due to ongoing myalgias (Although CK is normal), we dose reduced to 300 mg BID.     In Dec 2022, developed Gr 3 arthralgia, and we stopped drug since 12/20/22. Had unremitting arthalgias, eventully had to starte PO steroids which led to improvement and resolved. Radiographicaly, he has had a near CR to Rx in the lung, has a residual rt LL lesion.     Began brigatinib 2/22/23 (delayed due to pt hesitancy). Developed severe cough on day 2 so brigatinib was held and cough resolved with in 24-48 hours. He restarted 60 mg daily and upped it to 90 mg.Restging CT  showing stable diseas ein the lung, however, MRI with several contrast enhancement and increase in size of previously treated lesions. I discussed going up to 180 mg daily. He is agreeable.   He will f/u with [facility] in 2 weeks for tox check.    Plan  F/u with [facility] in 2 weeks      # Brain mets:  # Radiation necrosis  Baseline Brain MRI with several brain mets, s/p GK to 11-12 brain mets. F/u Brain MRI in June 2022 was showing enlargement of the one of the lesions along with edema, therefore had to undergo craniotomy followed by resection, the final biopsy consistent with radiation necrosis.  Had issues with radiation necrosis and swelling requiring steroids but these were driving up blood sugars drastically  -Was on bevacizumab for radiation necrosis --15 mg/kg every 3 weeks.  S/p 2 doses of Bevacixumab, now on hold due HTN urgency and PE.  MRI in 4/2023 now showing contrast enhancement of lesions and a dominat lesion int he rt frontal regionw ith edema, several other smaller lesion, un lcear to me how many are new.  -continue with dex 4 mg daily  -F/u with Dr. Moran to consider NSGY resection of the dominant lesion in rt frontal region  -will consider more GK depending on the overlap with pervious sites of radiation,        # Grade 3 arthralgias most likley related to alectinib, unremiittign with tylenol, NSAIDs or xocyodoen,- nearly resolved with steroids  CK and aldolase has been normal   -All joints affected, no morning stiffness, normal ESRa nd CRP  -following with palliative;   -on methadone 2.5 mg in AM and 5 mg at HS; oxycodone 5-10 mg po q 4 hours prn; Celecoxib 100 mg po BID--held for stomach ache-        # PE: provoked by malignancy vs bevacizumab in 11/2022  -New right-sided pleuritic chest pain, tachycardiam, CT showing rt sided sub segmental PE wo   -- on rivaroxiabn 20 mg daily- stopped taking it  -    # G3 diastolic HTN - led to one dose hold of bevacizumab and now discontinued  Adm with /111. No  evidence of end-organ damage. Most likley from Bevacizumab. Recent MRI brain revealed no PRES, stable necrotic/mets areas and no new enhancement.  - HTCZ, lisinopril, and propranolol, mgmt per PCP     # Type 2 Diabetes: Self monitoring of blood glucose is not at goal of fasting  mg/dL. Now off of dexamethasone. Started metformin 500mg ER every day    --Started using STORYS.JP 2 continous glucose monitor  --FOllows medication management-   --on  Metformin and insulin    #normocytic anemia: sudden drop to <7, requiring 1 unit blood transfusion. Lab work up unrevealing. Has recovered to baseline  -consider EGD if hgb drops again, risk for GI bleed with chronic steroid use        #right chest/rib pain---> intermittent- improved  Reproducible and intermittent, worse with movement of torso. Unsure of etiology but we agreed to monitor given mild, possibly msk, and no correlating respiratory or cardiac symptoms.        #MSSA infection, Sepsis at pleurex site- resolved  # Pleural effusion: s/p pleurex palcement  2/16/22. Then on 4/2 right PleurX catheter s/p removal for severe sepsis due to MSSA infection.      On the day of service  Chart review: 5 minutes  Visit duration: 30 minutes  Care coordination: 5 minutes    Bassam Persaud MD    Hematology, Oncology and Transplantation        Virtual Visit Details    Type of service:  Video Visit   Video Start Time: 4:19 PM  Video End Time:4:19 PM    Originating Location (pt. Location): Home    Distant Location (provider location):  On-site  Platform used for Video Visit: Daniel

## 2023-04-24 NOTE — NURSING NOTE
Is the patient currently in the state of [place]? YES    Visit mode:VIDEO    If the visit is dropped, the patient can be reconnected by: VIDEO VISIT: Text to cell phone: [phone]    Will anyone else be joining the visit? NO      How would you like to obtain your AVS? MyChart    Are changes needed to the allergy or medication list? NO    Patient denies any changes since echeck-in regarding medication and allergies and states all information entered during echeck-in remains accurate.    Reason for visit: Video Visit    Herber VAZ

## 2023-04-24 NOTE — LETTER
4/24/2023         RE: Connor Emerson  [address]  [facility] [address]        Dear Colleague,    Thank you for referring your patient, Connor Emerson, to the [facility]. Please see a copy of my visit note below.        MEDICAL ONCOLOGY FOLLOW UP NOTE    PATIENT NAME: Connor Emerson  ENCOUNTER DATE: 4/24/2023    Care Team  Primary Oncologist: Bassam Persaud MD    REASON FOR CURRENT VISIT: F/u of lung cancer    HISTORY OF PRESENT ILLNESS:  Mr. Connor Emerson is a 42 year old  male who is a non-smoker with PMHx of T2DM, HTN with metastatic NSCLC comes for follow up     Oncologic Hx:    Diagnosis:   Stage IV NSCLC, Rt lung adenocarcinoma with metastasis to pleura, mediastinum , rt pleural effusion and brain diagnosed 1/2022 (AJCC 8th edition)  PD-L1 TPS 2-3% by [place]   NGS [facility] panel-EML4:ALK rearragement  NGS Guardant- GNAS R201H, KRAS K5E- No ALK    Treatment:    2/23/2022- current: Brigatinib. Dose reduced to 60 mg due to cough after two days of 90 mg daily -->now back on 90 mg    3/2/22- 12/2022 Alectinib 300 mg BID (Dose reduced to 450 mg BID from 600 mg BID due to grade 3 myalgias 3/21/22, again reduced to 300 mg BID 9/28/22)    )  Past:  2/15/22- GK to 11 brain lesions  6/28/22- Craniotomy, resection  9/28/22-10/26- Bevacizumab for radiation necrosis (stopped due to PE)      Intent of treatment: Palliative    Oncologic course:  1/19/22 to 1/22/22-Admitted to [facility] for 2 week progressive SOB secondary to have large rt sided pleural effusion, needing thoracentesis x2 (1.7L and 2.0 L removed), cytology positive for malignancy, adenocarcinoma.   1/26/22- Rt pleural mass biopsy-Dr. Agrawal--POSITIVE FOR ADENOCARCINOMA CONSISTENT WITH LUNG PRIMARY, admixed with mesothelial hyperplasia and inflammatory infiltrate (+ TTF-1 and CK 7;  negative  p40, calretinin and WT-1. PAX8 immunostain focal +). 4th thoracentesis done simultaneously - 3L approx removed.   2/1/22- PET/CT-Right  lower lobe central infiltrative FDG avid 8.2 x 9.6 cm mass representing a primary lung adenocarcinoma. Ipsilateral right perihilar, bilateral pretracheal, subcarinal and superior mediastinal michele metastases. Contralateral mildly FDG avid few lung nodules are suspicious for contralateral metastasis. At least 3 intracranial metastases in the right frontal lobe, left frontal lobe and left cerebellar hemisphere. Nonspecific mild diffuse bone marrow uptake. Further evaluation with a spine MRI could be considered to rule out early marrow infiltration. This could also be seen with red marrow conversion.  2/5/22-  Brain MRI- At least 9 intracranial metastases as detailed above. The dominant lesions involving the orbital right frontal lobe, the posterior left middle frontal gyrus, anterior right temporal lobe and in the left cerebellar hemisphere have surrounding moderate vasogenic type edema.  2/15/22- Saw Dr. Arango from Rad Onc- Rcd GK to 12 lesion in bran  2/16/22- Pleurex placement   3/2/22- Started Alectinib 600 mg BID  3/21/22- Dose reduced to 450 mg BID due to grade 3 myalgias and fatigue  4/2/22 to 4/5/22- Admitted at [facility] for- Severe sepsis due to MSSA infection of right PleurX catheter s/p removal- He presented with onset of pain at tube site starting 4/1; at arrival was tachycardic with leukocytosis (22.7) and elevated lactic acid (2.9).  CT chest showed fluid and stranding tracking outside the pleural space into chest wall along pleural catheter.  IR was consulted and removed catheter 4/2 with report of pustular drainage and tip culture growing MSSA.  Thoracic Surg was consulted who felt no surgical indication necessary given minimal pleural fluid and lack of any signs of abscess.  Initially treated with broad spectrum coverage for sepsis, narrowed to Ancef once sensitivities returned with plan to transition to cefadroxil for an additional 10 days at discharge per ID. Held drug 4/2 to 4/11 5/2/22- CT  CAP- Overall, positive response to therapy with decreased size of right lower lobe and right pleural-based masses, pulmonary metastases, hilar and mediastinal lymphadenopathy. However, a single right posterior pleural-based mass has slightly increased in size since 2/24/2022. No metastatic disease in the abdomen and pelvis. Right Pleurx catheter has been removed. Trace right pleural effusion and right basilar atelectasis.  5/2/22- Brain MRI- The previously demonstrated brain metastases are mildly diminished in size versus to 2/5/2022. The degree of edema is also diminished but not completely resolved. Probable trace amounts of intralesional bleeding demonstrated on the gradient sequence within the metastases. No definite new metastasis or progressive mass effect. No hydrocephalus or infarct.    6/15/22 to 6/17/22- Admitted at [facility]-with aphasia and word finding difficulty over last few weeks.  He presented to [facility] ED on 6/10 for evaluation of his symptoms. MRI brain showed multiple intracranial metastases, with interval enlargement of the dominant lesion within the left frontal lobe and increased surrounding vasogenic edema with 2 mm rightward shift of the septum pellucidum. Due to his worsening anxiety, he left AMA. His symptoms continued to progress to where he could not write at work so he decided to go to the ED for re-evaluation and treatment. Evaluated by NSGY, Rad Onc (radiaiton necrosis vs tumor progression).  6/16/22- MR Brain (6/16) shows multiple intracranial metastases, with interval enlargement  of the dominant lesion within the left frontal lobe and increased surrounding vasogenic edema with 2 mm rightward shift of the septum pellucidum.  6/16/22- - CT CAP shows slightly decreased size of right lower lobe and right pleural-based masses. No new pulmonary nodules or lymphadenopathy; No evidence of metastatic disease in the abdomen or pelvis.   6/28/22 to 6/30/22- Admitted at [facility]- Elective left Stealth  craniotomy with resection of brain tumor due to ongoing symptoms. No intraoperative complications. EBL 50 ml.  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  7/5/22- Ct CAP- Right lower lobe low-density nodules are not significantly changed. A small left upper lobe pulmonary nodule is also unchanged. Trace pleural fluid on the right has increased slightly. No convincing evidence for metastatic disease in the abdomen or pelvis.  7/18/22 to 7/19/22- Admitted to [facility] for seizure- Reportedly was only taking once Keppra instead of twice daily. Also resumed on dexamethasone 2 mg daily  8/1/22- Brain MRI- Redemonstrated postsurgical changes status post left frontoparietal Craniotomy. Interval increase in size of the dominant ring-enhancing lesion in the left posterior superior frontal lobe with increased moderate surrounding vasogenic edema and local mass effect resulting in narrowing of the supratentorial ventricular system. No significant midline shift/herniation at this time  8/1/22- Dex was increased to 4 mg daily by Dr. Moran  9/1/22- CT Chest- Near resolution of previously seen right pleural nodule. Stable right lower lobe pulmonary nodule  9/28/22- Bevacizumab for radiation necrosis  10/26/22- Bevacizumab   10/26/22- Ct CAP- Stable posterior medial right lower lobe 1.9 x 1.1 cm nodule series 8 image 176. Adjacent stable scarring and atelectasis. The previously noted pleural nodule posteriorly on the right is not currently clearly identified. Stable left upper lobe 0.3 cm nodule image 56  10/26/22- Brain MRI- Overall improved appearance of multiple intracranial metastases with near resolution of associated edema and diminished enhancement and size of multiple residual lesions. No definite new or progressive metastasis.  11/7/22 to 11/9/22- Admitted for PE and HTN urgency- Small pulmonary embolism in the right lower lobe pulmonary artery. started on Lovenox, Brain MRI neg for PRES.  12/29/22- ED visit-  bilateral hip pain, pain in shoulders, knuckles, knees, and ankles- holding alectinib since 12/20/22 1/6/23- Ct CAP- Mild groundglass nodularity in the left upper lobe is new since the previous exam, and may be infectious in etiology. No other significant interval change. Pulmonary nodules are not significantly changed.  1/6/23- Brain MRI- Stable to diminished sequelae of intracranial metastasis and treatment changes. No new or progressive metastasis. No superimposed acute intracranial finding.    2/23/2022- Start Brigatinib. Dose reduced to 60 mg due to cough after two days of 90 mg daily -  3/23/23-Brigatinib  (increase to 90 mg)  4/20/23- CT CAP- Stable- Previously noted mild groundglass nodularity in the left upper lobe has resolved.Pulmonary nodules are unchanged.Trace amount pleural fluid on the right has decreased slightly.  4/20/23- Brain MRI- Possile progression- Largest metastasis within the right frontal lobe has increased in size with worsening peripheral nodular enhancement and worsening vasogenic edema contributing to new right to left midline shift.  Multiple new/enlarging metastases scattered throughout the cerebral hemispheres and cerebellum.    He works as a maintenance manger for apartment complexes    Interval Hx:  Connor is here to follow up. We discussed his MR and CT results. He is currently on 90 mg brigatinib and tolerating it well.His joints aches and pains are only minor and he does not have any breathign issues.  He started taking the dexamethasone last week and noticed that his headaches resolved soon after. He denies any new neurologic symptoms. Continues to work full time.    ECOG PS 0    REVIEW OF SYSTEMS: 14 point ROS negative other than the symptoms noted above in the HPI.    Wt Readings from Last 4 Encounters:   04/06/23 100.1 kg (220 lb 11.2 oz)   01/09/23 97.8 kg (215 lb 11.2 oz)   12/29/22 97.5 kg (215 lb)   11/22/22 99.7 kg (219 lb 12.8 oz)      Review of Systems:  A  comprehensive ROS was performed and found to be negative or non-contributory with the exception of that noted in the HPI above.    Past Medical History:  GERD  Hypertension, not on medication  Type 2 diabetes mellitus, not on medications currently, previously on Metformin    Past Surgical History:  Past Surgical History:   Procedure Laterality Date    BRONCHOSCOPY RIGID OR FLEXIBLE W/TRANSENDOSCOPIC ENDOBRONCHIAL ULTRASOUND GUIDED Bilateral 1/26/2022    Procedure: Right BRONCHOSCOPY, FIBEROPTIC, endobronchial ultrasound, pleural biopsy;  Surgeon: Dallin Agrawal MD;  Location:  OR    INJECT BLOCK MEDIAL BRANCH CERVICAL/THORACIC/LUMBAR      INSERT CHEST TUBE Right 2/16/2022    Procedure: INSERTION, CATHETER, INTERCOSTAL, FOR DRAINAGE;  Surgeon: Dallin Agrawal MD;  Location: [facility]    INSERT CHEST TUBE Right 3/9/2022    Procedure: INSERTION, CATHETER, INTERCOSTAL, FOR DRAINAGE;  Surgeon: Sushila Antonio MD;  Location: [facility]    IR CHEST TUBE REMOVAL TUNNELED RIGHT  4/2/2022    OPTICAL TRACKING SYSTEM CRANIOTOMY, EXCISE TUMOR, COMBINED Left 6/28/2022    Procedure: Left stealth craniotomy for tumor resection with motor mapping;  Surgeon: Stephen Moran MD;  Location:  OR    ORTHOPEDIC SURGERY      Ganesh. Rotator cuff repair.    PLEUROSCOPY N/A 1/26/2022    Procedure: Pleuroscopy with Pleural Biopsy;  Surgeon: Dallin Agrawal MD;  Location:  OR       Social History:  Lives with wife and 4 kids in [place]. Works as a  for an apartment complex in [place]. Exposure to household chemicals and . No significant exposure to asbestos. No signal exposure to benzene or similar chemicals. No significant smoking history-states that he smoked 1 to 2 cigarettes occasionally per month for about 2 years in college, non-smoking since then. No significant alcohol use history. No other recreational substances. Good support system. Kids are 23, 19, 17 and 13.    Family History  Significant  history for cancers on maternal side. Mother  of uterine cancer. 2 maternal uncles have possible metastatic melanoma.    Outpatient Medications:  Current Outpatient Medications   Medication    albuterol (PROAIR HFA/PROVENTIL HFA/VENTOLIN HFA) 108 (90 Base) MCG/ACT inhaler    alcohol swab prep pads    blood glucose (NO BRAND SPECIFIED) test strip    blood glucose calibration (NO BRAND SPECIFIED) solution    blood glucose monitoring (NO BRAND SPECIFIED) meter device kit    brigatinib (ALUNBRIG) 30 MG TABS tablet    celecoxib (CELEBREX) 100 MG capsule    citalopram (CELEXA) 20 MG tablet    Continuous Blood Gluc Sensor (FREESTYLE IRENE 2 SENSOR) MISC    dexamethasone (DECADRON) 4 MG tablet    hydrochlorothiazide (HYDRODIURIL) 25 MG tablet    insulin aspart (NOVOLOG PEN) 100 UNIT/ML pen    insulin glargine (LANTUS PEN) 100 UNIT/ML pen    insulin pen needle (31G X 8 MM) 31G X 8 MM miscellaneous    levETIRAcetam (KEPPRA) 1000 MG tablet    lisinopril (ZESTRIL) 40 MG tablet    metFORMIN (GLUCOPHAGE XR) 500 MG 24 hr tablet    methadone (DOLOPHINE) 5 MG tablet    methocarbamol (ROBAXIN) 500 MG tablet    naloxone (NARCAN) 4 MG/0.1ML nasal spray    oxyCODONE (ROXICODONE) 5 MG tablet    prochlorperazine (COMPAZINE) 10 MG tablet    prochlorperazine (COMPAZINE) 10 MG tablet    propranolol (INDERAL) 20 MG tablet    rivaroxaban ANTICOAGULANT (XARELTO) 20 MG TABS tablet    thin (NO BRAND SPECIFIED) lancets     No current facility-administered medications for this visit.     PHYSICAL EXAMINATION ATTAINABLE DURING VIDEO VISIT:  CONSTITUTIONAL - Pt looks like stated age, pleasant, not in acute distress. Not obese.  NEURO: Oriented to time, person, and places. No tremor.  ENT, MOUTH: Pupils are equal.  Sclerae are anicteric.  Moist oral mucosa. No oral thrush.   NECK:  No jugular venous distention.  No thyroid enlargement.   RESPIRATORY: talk nl, no sob during conversation, no cough.   MUSCULOSKELETAL/EXTREMITIES:  No edema.  No joint  deformity. Normal range of motion  SKIN:  No petechiae.  No rash.  No signs of cellulitis.   PSYCHIATRIC: Normal mood and affect. Good memory. Proper insight and judgement.   THE REST OF A COMPREHENSIVE PHYSICIAL EXAM IS DEFERRED DUE TO COVID-19 PUBLIC HEALTH EMERGENCY RELATED VIDEO VISIT RESCTRICTION.      Labs & Studies: I personally reviewed the following studies:  Most Recent 3 CBC's:  Recent Labs   Lab Test 04/06/23  1400 03/15/23  1432 02/09/23  1929   WBC 10.2 8.9 11.2*   HGB 15.2 14.2 16.7   MCV 88 89 93    224 170     Most Recent 3 BMP's:  Recent Labs   Lab Test 04/06/23  1400 03/15/23  1432 02/09/23  1929    149* 142   POTASSIUM 4.0 4.4 3.8   CHLORIDE 105 109* 103   CO2 26 29 25   BUN 12.2 18.1 18.0   CR 0.70 0.71 0.53*   ANIONGAP 14 11 14   TORY 9.9 9.7 9.5   *  141*  144* 203* 231*    Most Recent 2 LFT's:  Recent Labs   Lab Test 04/06/23  1400 03/15/23  1432   AST 19 22   ALT 19 17   ALKPHOS 87 84   BILITOTAL 0.5 0.5    Most Recent TSH and T4:  Recent Labs   Lab Test 09/12/22  1642   TSH 2.56        ASSESSMENT AND PLAN:  Stage IV NSCLC, Rt lung adenocarcinoma with metastasis to pleura, mediastinum , rt pleural effusion and brain diagnosed 1/2022 (AJCC 8th edition)  PD-L1 TPS 2-3% by [place]   NGS [facility] panel-EML4:ALK rearragement; chr2:[id number], chr2:[id number]  NGS Guardant- GNAS R201H, KRAS K5E- No ALK    He began Alectinib 600 mg BID 3/2/22 and unfortunately developed grade 3 myalgias which have improved with lowering the dose 450 mg BID. He was holding drug 4/2/22 to 4/11/22 due to MSSA infection from pleurex which is removed. Resumed at 450 mg BID. Due to ongoing myalgias (Although CK is normal), we dose reduced to 300 mg BID.     In Dec 2022, developed Gr 3 arthralgia, and we stopped drug since 12/20/22. Had unremitting arthalgias, eventully had to starte PO steroids which led to improvement and resolved. Radiographicaly, he has had a near CR to Rx in the lung, has a residual  rt LL lesion.     Began brigatinib 2/22/23 (delayed due to pt hesitancy). Developed severe cough on day 2 so brigatinib was held and cough resolved with in 24-48 hours. He restarted 60 mg daily and upped it to 90 mg.Restging CT showing stable diseas ein the lung, however, MRI with several contrast enhancement and increase in size of previously treated lesions. I discussed going up to 180 mg daily. He is agreeable.   He will f/u with Chelsea in 2 weeks for tox check.    Plan  F/u with Chelsea in 2 weeks      # Brain mets:  # Radiation necrosis  Baseline Brain MRI with several brain mets, s/p GK to 11-12 brain mets. F/u Brain MRI in June 2022 was showing enlargement of the one of the lesions along with edema, therefore had to undergo craniotomy followed by resection, the final biopsy consistent with radiation necrosis.  Had issues with radiation necrosis and swelling requiring steroids but these were driving up blood sugars drastically  -Was on bevacizumab for radiation necrosis --15 mg/kg every 3 weeks.  S/p 2 doses of Bevacixumab, now on hold due HTN urgency and PE.  MRI in 4/2023 now showing contrast enhancement of lesions and a dominat lesion int he rt frontal regionw ith edema, several other smaller lesion, un lcear to me how many are new.  -continue with dex 4 mg daily  -F/u with Dr. Moran to consider NSGY resection of the dominant lesion in rt frontal region  -will consider more GK depending on the overlap with pervious sites of radiation,        # Grade 3 arthralgias most likley related to alectinib, unremiittign with tylenol, NSAIDs or xocyodoen,- nearly resolved with steroids  CK and aldolase has been normal   -All joints affected, no morning stiffness, normal ESRa nd CRP  -following with palliative;   -on methadone 2.5 mg in AM and 5 mg at HS; oxycodone 5-10 mg po q 4 hours prn; Celecoxib 100 mg po BID--held for stomach ache-        # PE: provoked by malignancy vs bevacizumab in 11/2022  -New right-sided  pleuritic chest pain, tachycardiam, CT showing rt sided sub segmental PE wo   -- on rivaroxiabn 20 mg daily- stopped taking it  -    # G3 diastolic HTN - led to one dose hold of bevacizumab and now discontinued  Adm with /111. No evidence of end-organ damage. Most likley from Bevacizumab. Recent MRI brain revealed no PRES, stable necrotic/mets areas and no new enhancement.  - HTCZ, lisinopril, and propranolol, mgmt per PCP     # Type 2 Diabetes: Self monitoring of blood glucose is not at goal of fasting  mg/dL. Now off of dexamethasone. Started metformin 500mg ER every day    --Started using [employer] 2 continous glucose monitor  --FOllows medication management-   --on  Metformin and insulin    #normocytic anemia: sudden drop to <7, requiring 1 unit blood transfusion. Lab work up unrevealing. Has recovered to baseline  -consider EGD if hgb drops again, risk for GI bleed with chronic steroid use        #right chest/rib pain---> intermittent- improved  Reproducible and intermittent, worse with movement of torso. Unsure of etiology but we agreed to monitor given mild, possibly msk, and no correlating respiratory or cardiac symptoms.        #MSSA infection, Sepsis at pleurex site- resolved  # Pleural effusion: s/p pleurex palcement  2/16/22. Then on 4/2 right PleurX catheter s/p removal for severe sepsis due to MSSA infection.      On the day of service  Chart review: 5 minutes  Visit duration: 30 minutes  Care coordination: 5 minutes    Bassam Persaud MD    Hematology, Oncology and Transplantation        Virtual Visit Details    Type of service:  Video Visit   Video Start Time: 4:19 PM  Video End Time:4:19 PM    Originating Location (pt. Location): Home    Distant Location (provider location):  On-site  Platform used for Video Visit: Daniel

## 2023-04-25 NOTE — TELEPHONE ENCOUNTER
LVM asking pt to call back to Triage to discuss headaches. PH: [phone], option 5, option 2    Nurse Triage SBAR    Situation: R ear ringing and HA significantly worse since yesterday    Background:    DX: Stage IV NSCLC, Rt lung adenocarcinoma with metastasis to pleura, mediastinum , rt pleural effusion and brain diagnosed 1/2022    Provider: Dr. Persaud, Jamari Ya, RNCC    Most recent appointment: 4/24/23 virtual visit with Dr. Persaud    Upcoming appointments: 5/1223 with Chelsea Villegas    Most recent treatment:   Recent Treatment:2/23/22: current: Brigatinib. Does reduced to 60 mg due to cough after two days of 90 mg daily  3/2/22-12/2022 Alectinib 300 mg BID (does reduced to 450 mg reduced to 300 mg BID 9/28/22)  2/15/22-GK to 11 brain lesions  6/28/22: Craniotomy, resection  9/28/22-10/26-Bevacizumub of radiation necrosis (stopped d/t PE)    Assessment:     Onset: Yesterday got worse, goes into R side of neck. It's like waking up to stiff neck, no vision problems    Location: Right side of head, ear ringing went down last night, some ringing but not as bad today;  Dr. Persaud prescribed steroids last week.    Worsens or wakes pt up from sleep? Yes    Radiating: R neck hurts; it's a dull achy pain; feels like a kink in his neck that doesn't go away.    Is pt currently on a steroid? Yes What is current dose? 1 pill every day, 4 mg/tablet. (Dr. LEROY said to take one pill not 1.5 pills as prescribed.)    Other things tried: Tried ice already, but it hasn't helped.    Rates: 2/10, now. If wakes up and pain is not there, then it's not there; pain is 5/10 at worst and is annoying    Temperature: No (100.4F or higher if neutropenia suspected?)    LOC: Yes AxOx3, no confusion or altered consciousness    No nausea/vomiting/restlessness, drowsiness, visual changes, soreness in jaw/shoulders. Does not interfere with activity.    Pt drinks sugar free orange pop, one coffee each a.m. does not affect  him.    Recommendations:   Pt states that Dr. Persaud told him on Fri or Mon that he would get in touch with Dr. Moran about surgery. Pt has not heard back. Pt is wondering what the status is and is curious about the plan of care.    This writer educated pt to observe for change in headache or head pain, drink clear liquids if unknown origin, rest in a dark, quiet room, elevate head, apply ice or heat depending on preference to head and neck, take medications as prescribed.    Pt advised to call back with worsening sx.    Routed to Dr. Persaud and Celso Ya RNCC

## 2023-04-27 ENCOUNTER — PRE VISIT (OUTPATIENT)
Dept: NEUROSURGERY | Facility: CLINIC | Age: 43
End: 2023-04-27
Payer: MEDICAID

## 2023-04-27 NOTE — TELEPHONE ENCOUNTER
NEUROSURGERY- NEW PREVISIT PLANNING       Record Status/Location     Referring Provider 4/24/23 Progress Note Bassam Persaud MD   Diagnosis   consider NSGY resection of the dominant lesion in rt frontal region   MRI (HEAD, NECK, SPINE) PACS 4/20/23 [facility]   CT     X-ray     INJECTION     PHYSICAL THERAPY     SURGERY 6/28/22 Left stealth craniotomy for tumor resection with motor mapping

## 2023-04-28 ENCOUNTER — OFFICE VISIT (OUTPATIENT)
Dept: NEUROSURGERY | Facility: CLINIC | Age: 43
End: 2023-04-28
Payer: COMMERCIAL

## 2023-04-28 VITALS — SYSTOLIC BLOOD PRESSURE: 165 MMHG | DIASTOLIC BLOOD PRESSURE: 103 MMHG | OXYGEN SATURATION: 96 % | HEART RATE: 90 BPM

## 2023-04-28 DIAGNOSIS — I67.89 RADIATION THERAPY INDUCED BRAIN NECROSIS: ICD-10-CM

## 2023-04-28 DIAGNOSIS — C79.31 METASTASIS TO BRAIN (H): Primary | ICD-10-CM
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
## 2023-04-28 PROCEDURE — 99214 OFFICE O/P EST MOD 30 MIN: CPT | Performed by: NEUROLOGICAL SURGERY

## 2023-04-28 RX ORDER — DEXAMETHASONE 2 MG/1
TABLET ORAL
Qty: 42 TABLET | Refills: 0 | Status: SHIPPED | OUTPATIENT
Start: 2023-04-28 | End: 2023-05-17

## 2023-04-28 ASSESSMENT — PAIN SCALES - GENERAL: PAINLEVEL: MILD PAIN (3)

## 2023-04-28 NOTE — NURSING NOTE
"Connor Emerson is a 42 year old male who presents for:  Chief Complaint   Patient presents with     RECHECK     Right sided headache pressure in head        Initial Vitals:  BP (!) 165/103   Pulse 90   SpO2 96%  Estimated body mass index is 32.58 kg/m  as calculated from the following:    Height as of 4/6/23: 5' 9.02\" (1.753 m).    Weight as of 4/6/23: 220 lb 11.2 oz (100.1 kg).. There is no height or weight on file to calculate BSA. BP completed using cuff size: regular  Mild Pain (3)    Nursing Comments:       Radha Montano    "

## 2023-04-28 NOTE — LETTER
"    4/28/2023         RE: Connor Emerson  [address]  [facility] [address]        Dear Colleague,    Thank you for referring your patient, Connor Emerson, to the [facility]. Please see a copy of my visit note below.    It was a pleasure to see Connor Emerson today in Neurosurgery Clinic. He is a 42 year old male with metastatic non-small cell lung cancer who is previously undergone radiosurgery to multiple brain metastases as well as a left frontal craniotomy in February and June 2022 respectively.  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  He has been doing well until recent headaches started.  He denies any other significant neurologic symptoms.    He had a recent MRI and was started on dexamethasone which have improved his headaches.    Vitals:    04/28/23 1430   BP: (!) 165/103   Pulse: 90   SpO2: 96%     Estimated body mass index is 32.58 kg/m  as calculated from the following:    Height as of 4/6/23: 1.753 m (5' 9.02\").    Weight as of 4/6/23: 100.1 kg (220 lb 11.2 oz).  Mild Pain (3)    Well-healed incision  Bilateral upper and lower extremity strength is 5 out of 5 in all muscle groups    Imaging: Review of his MRI from a few days ago along with February 2022, October 2022 in January of this year shows clear improvement in the treated lesions after radiosurgery although there has been some increase in size recently the lesions still remain inside the previous treatment fields.  Imaging was reviewed with the patient and shown to the patient clinic today.    Assessment: Brain metastasis, status post radiosurgery with either recurrence or radiation necrosis.  Vasogenic cerebral edema.    Plan: I think it would be reasonable to try a course of steroids and reimage in 6 weeks.  A craniotomy would certainly be feasible if needed for the right frontal lesion but my suspicion is based on his previous pathology that this likely represents radiation " necrosis.  I have given him a steroid taper over the next month and we will reimage with a follow-up in 6 weeks.         Again, thank you for allowing me to participate in the care of your patient.        Sincerely,        Stephen Moran MD

## 2023-04-28 NOTE — PROGRESS NOTES
"It was a pleasure to see Connor Emerson today in Neurosurgery Clinic. He is a 42 year old male with metastatic non-small cell lung cancer who is previously undergone radiosurgery to multiple brain metastases as well as a left frontal craniotomy in February and June 2022 respectively.  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  He has been doing well until recent headaches started.  He denies any other significant neurologic symptoms.    He had a recent MRI and was started on dexamethasone which have improved his headaches.    Vitals:    04/28/23 1430   BP: (!) 165/103   Pulse: 90   SpO2: 96%     Estimated body mass index is 32.58 kg/m  as calculated from the following:    Height as of 4/6/23: 1.753 m (5' 9.02\").    Weight as of 4/6/23: 100.1 kg (220 lb 11.2 oz).  Mild Pain (3)    Well-healed incision  Bilateral upper and lower extremity strength is 5 out of 5 in all muscle groups    Imaging: Review of his MRI from a few days ago along with February 2022, October 2022 in January of this year shows clear improvement in the treated lesions after radiosurgery although there has been some increase in size recently the lesions still remain inside the previous treatment fields.  Imaging was reviewed with the patient and shown to the patient clinic today.    Assessment: Brain metastasis, status post radiosurgery with either recurrence or radiation necrosis.  Vasogenic cerebral edema.    Plan: I think it would be reasonable to try a course of steroids and reimage in 6 weeks.  A craniotomy would certainly be feasible if needed for the right frontal lesion but my suspicion is based on his previous pathology that this likely represents radiation necrosis.  I have given him a steroid taper over the next month and we will reimage with a follow-up in 6 weeks.     "

## 2023-05-04 ENCOUNTER — PATIENT OUTREACH (OUTPATIENT)
Dept: ONCOLOGY | Facility: CLINIC | Age: 43
End: 2023-05-04
Payer: MEDICAID

## 2023-05-04 NOTE — PROGRESS NOTES
Pt reports he is miserable from lack of sleep. Started a week ago. Usually in bed around 5 or 6 until 8 or 9, then is awake for the rest of the evening. Kicked in a week ago was not gradual. The lack of sleep is beginning to effect his work and temperament. Pt started on Dexamethasone 4/28 and is taking it in the AM as advised. No other Sx today. Noted his stomach was kind of crampy and like he was constipated, but was still having BMs, this has gradually improved as the week has progressed.    Pt going out of town tomorrow morning through Sunday. Is hoping for an Rx to  #[id number] [employer] today so he can have it for his trip. With [place] and Dr Persaud out this week I messaged Dr Brewer's Team.

## 2023-05-05 DIAGNOSIS — Z53.9 ERRONEOUS ENCOUNTER--DISREGARD: Primary | ICD-10-CM

## 2023-05-05 NOTE — PROGRESS NOTES
This was opened erroneously  Ajith Brewer MD MS FAAFP  Text me via [facility].  [facility]  Office [phone]  Fax [phone]

## 2023-05-08 ENCOUNTER — VIRTUAL VISIT (OUTPATIENT)
Dept: ONCOLOGY | Facility: CLINIC | Age: 43
End: 2023-05-08
Payer: COMMERCIAL

## 2023-05-08 ENCOUNTER — PATIENT OUTREACH (OUTPATIENT)
Dept: PALLIATIVE CARE | Facility: CLINIC | Age: 43
End: 2023-05-08

## 2023-05-08 DIAGNOSIS — F43.21 GRIEF: ICD-10-CM

## 2023-05-08 DIAGNOSIS — F41.9 ANXIETY: Primary | ICD-10-CM

## 2023-05-08 DIAGNOSIS — Z51.5 PALLIATIVE CARE PATIENT: ICD-10-CM

## 2023-05-08 DIAGNOSIS — G47.00 INSOMNIA: Primary | ICD-10-CM

## 2023-05-08 PROCEDURE — 90791 PSYCH DIAGNOSTIC EVALUATION: CPT | Mod: VID | Performed by: SOCIAL WORKER

## 2023-05-08 RX ORDER — ZOLPIDEM TARTRATE 5 MG/1
5 TABLET ORAL
Qty: 20 TABLET | Refills: 0 | Status: SHIPPED | OUTPATIENT
Start: 2023-05-08 | End: 2023-06-30

## 2023-05-08 RX ORDER — ESCITALOPRAM OXALATE 10 MG/1
10 TABLET ORAL DAILY
Qty: 30 TABLET | Refills: 3 | Status: ON HOLD | OUTPATIENT
Start: 2023-05-08 | End: 2023-05-20

## 2023-05-08 NOTE — NURSING NOTE
Is the patient currently in the state of [place]? YES    Visit mode:VIDEO    If the visit is dropped, the patient can be reconnected by: VIDEO VISIT: Text to cell phone: [phone]    Will anyone else be joining the visit? NO      Patient confirms medications and allergies are accurate via patients echeck in completion, and or denies any changes since last reviewed/verified.     Patient declined individual allergy and medication review by support staff because pt recently reviewed and stated all are correct.     Stephani Oden, Virtual Facilitator    How would you like to obtain your AVS? MyChart    Are changes needed to the allergy or medication list? NO    Reason for visit: Video Visit (Consult)

## 2023-05-08 NOTE — TELEPHONE ENCOUNTER
[facility]: Palliative Care                                                                                Spoke with patient on May 4, 2023 regarding his reports of insomnia as reported to his oncology care team.  He is currently taking dexamethasone for the next month.  He has noted increased issues with sleep, and is asking for medication assistance.  Patient is okay to address the medication on Monday May if as he is sleeping the state at 5 AM on May 4.    Per Dr. Brewer we will try Ambien 5 mg for a few days.  Patient will check in with palliative care coordinator and if not helping may increase to 10 mg every night.    Updated patient with the plan.  We will also send a [employer] message regarding sleep hygiene habits.     Pat  Palliative Care Nurse Coordinator  [phone]

## 2023-05-08 NOTE — TELEPHONE ENCOUNTER
[facility]: Palliative Care                                                                                Spoke with patient on  Thursday, May 4 around 3:30 PM.  He reports insomnia as described to his oncology care team (see previous documentation) he is leaving town tomorrow very early in the morning.  He rather wait till Monday morning to address getting any new medications.    Pat  Palliative Care Nurse Coordinator  [phone]

## 2023-05-08 NOTE — PROGRESS NOTES
"    Video-Visit Details    Type of service:  Video Visit    Video Visit Start Time:1:06    Video End Time:1:40    Originating Location (pt. Location): Other work        Distant Location (provider location):  Off-site     Platform used for Video Visit: [employer]     Palliative Care Counseling Services - Initial Assessment    PLEASE NOTE:  THIS IS A MENTAL HEALTH NOTE.  OTHER PROVIDERS VIEWING THIS NOTE SHOULD USE THIS ONLY FOR UNDERSTANDING THE CONTEXT OF THE PATIENT S EXPERIENCE.  TOPICS DESCRIBED IN THIS NOTE SHOULD NOT BE REFERENCED TO THE PATIENT BY MEDICAL PROVIDERS    Connor Emerson is a 42 year old man with diagnosis of NSCLC , seen today for initial palliative care clinical assessment via [employer] video.  Connor was at his place of work for our session today.     Referred by: Dr. Brewer (palliative MD)    Presenting Issues: Coping with illness and Anxiety    Preferred Name: Connor    Mental Status Exam: (List all that apply)      Appearance: Appropriate      Eye Contact: Good       Orientation: Yes, x4      Mood: Normal      Affect: Appropriate      Thought Content: Clear         Thought Form: Logical      Psychomotor Behavior: Normal    Family:       Marital status:     Years :     Years together: 25     Name of spouse/partner: Kylie       Children: 4 children ages 25, 22, 19 and 15.  Youngest lives with disabilities and Kylie is her caregiver.       Parents: Mother , father living      Siblings: not asked      Other:     Support system: Family--identifies his children as his main support.  He and Kylie \"have grown apart\" and Connor notes that they have had very little communication for the past several months despite living together.     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    Education highest level: not asked     Financial:       Descriptor: Tight \"we're getting by, but it's tight\"      Health insurance: Medical assistance    Legal concerns: No       Area(s) of concern: Not applicable    Health Care Directive: Has one:  No       If yes, copy in EMR: Not Available       Basic information regarding health care directive provided: Yes       Health Care Agent(s): No health care directive:  Surrogate  health care decision maker is per legal succession -- Connor is aware that his wife would be his decision maker unless a health care directive appoints someone else. He voices that this is ok for now.       POLST? no    Medical History/Issues (patient account): Diagnosed with lung cancer after he presented to the ED with respiratory distress in January 2022.  Admitted to the hospital and told of his diagnosis shortly before he was discharged 5 days later. Had gamma knife, brain surgery and chemo.  \"Chemo was rough\" due to difficult side effects including pain.  Palliative then got involved and he reports that the pain is much better managed now.     .  Recently told by team that he has a tumor pressing against side of his head; prescribed steroids and back on chemo. Was not sure if he wanted to go back on chemo because the side effects were so difficult the first time, but palliative has helped with the pain and it is managed at this time.     Connor reports he was told that if he did not do more chemotherapy he would be measuring time in months.  Now that he is on chemo \"I really don't know.  I don't know where I am with all of this\"    He has another set of scans scheduled in a few weeks.     Pain/Discomfort Issues: Pain, Fatigue, Sleep problems and Anxiety recently developed insomnia.  Describes difficulty falling asleep as well as onset of feeling of dread or " "fear something bad is about to happen, will sometimes \"burst into tears\"     Coping: \"things are rough right now\" -- difficulty in his marriage \"we're not really talking\" \"I guess we grew apart\", increased anxiety, insomnia; sense \"I'm not me\"     Sleep: recently developed insomnia -- possible he is having anxiety attacks?     Sexual Health/Intimacy: not directly asked but he shares over the course of the session today that he feels emotionally disconnected from his wife and has for the past several months.     Mental Health History and Current Review of Symptoms (patient account):     Depression in past?: No     Treatment in past?: was prescribed fluoxetine by his primary \"a while ago\"-- does not take it now.  Shares it was helpful in the past.   Treatment currently?:      Depression symptoms currently?:  - Anhedonia:  Yes  Enjoys time with family, does not have energy for hobbies like carpentry \"I'll go into my wood shop and just be overwhelmed\"   - Hopeless or down mood:  No -- endorses down mood, but also clear he does not feel hopeless \"I don't think I'm depressed\"   - Sleep problems (too much or too little):  Yes -- recent onset of insomnia.    - Fatigue:  Yes  Related to illness, sleep difficulties    - Appetite (too much or too little):  Not asked   - Excessively negative self perception:  No  - Trouble concentrating:  Yes  - Motor slowness:  No  - Current and/or recent thoughts of suicide:  No    Suicide risk screen:  - Past thoughts of suicide?  No  - Past attempts to end own life?  No  - If yes, how? No history  - Protective factors currently? Family, not depressed, no history   - Safety plan needed currently? Not indicated at this time    Anxiety in past?: Yes   Treatment in past?  Yes medication  Treatment currently?  No     Anxiety symptoms currently?  - Nervous, on edge:  Yes  - Sleep problems:  Yes  - Worrying a lot/hard to manage worries:  Yes  Specific recent areas of worry/fear/concern: worries " "about illness  - Tense, hard to relax:  No  - Feeling restless, hard to sit still:  No  - Irritable:  Yes  - Feeling of dread/ afraid that something awful may happen:  Yes  - How long?   - Impacts on daily life? Irritability in particular has been impacting personal relationships both at home and at his place of work \"my boss told me I think I'm talking, but I'm actually yelling\" also describes sense \"this isn't me\"     Struggling with uncertainty \"should I be planning my ?\"     Any other mental health diagnosis or symptoms in past?:  No    Any family history of mental health concerns?  No      Positive Bipolar Disorder screen?  No   Positive Thought Disorder screen?  Not asked   History of learning difficulties?  Not asked       Grief vs Depression:  Endorses symptoms for: Grief    Psychological Trauma and/or Major Losses:   None shared today     Nightmares?    na  Thought about when not wanting to think about? na  Tried hard not to think about it? na  Avoided situations that reminded you of it? na  [place] constantly on guard, watchful, or easily startled? na  Felt numb or detached from others, activities, or your surroundings? na    Safety Screen:  History of being harmed or controlled by someone close to you?  No  Being hurt or controlled by someone close to you?  No  Worried will be harmed in future?  No  Worried will harm someone else in future?  No    CD History:   Using alcohol actively? no    Amount per week:   Current concerns (self or other) about alcohol or drug use? No  Past concerns and/or CD treatment? No  Has \"med card\"-- uses edibles     Medications:   Any current concerns? No  Taking as prescribed currently? No     Rebeka/Spirituality:       Belong to a rebeka community: No     Specify:        Identify with a particular Sikhism: Raised Muslim; does not practice, \"left that when I was 16\" but since diagnosis has felt compelled to re-connect with Sikhism in some way.  He is not sure how      " "Identify as spiritual: Yes -- in sense of trying to make sense of this challenge in his life at this time.       How find expression: not explored today    Hope:      What do you hope for:    Difficulty answering this question today because \"it's really day by day\" for right now.  Scans will hopefully give some clarity in a few weeks, but difficult to know what to hope for when we do not know how his cancer is responding to treatment.        What gives you hope:    Rest          Perceived Needs: Emotional processing, possible couples counseling, goals of are support, anxiety support,     Resource needs/Referrals: None    Assessment:    Clinical Assessment Summary     Connor Emerson is a 42 year old man. This patient was referred by Dr. Brewer for initial palliative care clinical social work assessment and attend the interview today via [employer] video. They are diagnosed with metastatic NSCLC and in addition to their medical diagnosis also meets criteria for anxiety disorder, possible panic disorder. Their symptoms include persistent feelings of worry, feeling of dread / like something awful is about to happen followed by tearfulness that are contributing to sleep disturbance, irritability, difficulty focusing on hobbies  . These symptoms began: Connor reports he was prescribed fluoxetine for anxiety \"a while back\" no longer takes this med. Does not report history of depression.  No reports of SI; He has experienced functional impairment in coping, sleep, and personal relationships as well as working relationships for the last several montsh. It appears that contributing factors for their anxiety include: recently received news that he has another brain met \"that's pressing against head -- he had to start chemotherapy and he on steroids.  Uncertainty about prognosis and what to expect.  Another scan is scheduled for a few weeks \"I really don't know where I am with this\". Has 4 children, 2 under the age of 18.  His wife is " primary caregiver to their youngest child who is disabled.       Connor presents as a 42 year old man, facing anxiety, grief, and metastatic lung cancer with brain mets. Their relationships consist of his wife and children.  He reports in recent months there has been strain on his relationship with his wife.  They identify as a part of [not discussed] culture. Their medical condition has the potential to influence their mental health in the following ways: ongoing anxiety and irritability, ongoing issues with sleep.       Other mental health diagnoses that were considered include: PTSD, Major depressive disorder, adjustment disorder . The symptoms related to these possible diagnoses can currently be explained by anxiety disorder.   It is medically necessary for this client to receive outpatient psychotherapy services at this time. If their current mental health symptoms go untreated it is likely that distress will continue and impact significant relationships and quality of life. My recommendations for services for this client include psychotherapy, CBT, narrative therapy, couples counseling.  I have have also notified Dr Duval as Connor shares he is interested in starting an anti anxiety medication to help with symptoms. The client's prognosis from a mental health perspective is good.    Intervention: Initial palliative care counseling / clinical social work evaluation was conducted.  Palliative Care Counseling interventions available were discussed, including counseling related to serious illness, behavioral interventions for symptom management, consultation regarding goals of care/health care directive/POLST, and other interventions specific to the patient's situation or concerns.     Plan: Will have schedulers reach out to set up follow up sessions.      DSM5 Diagnoses:   300.02 (F41.1) Generalized Anxiety Disorder    Time Spent with Patient/Family: 34 minutes   (Start 1:06, end 1:40)    LISETH Barahona,  LICSW     DO NOT SEND ANY LETTERS

## 2023-05-08 NOTE — LETTER
"    2023         RE: Connor Emerson  [address]  [facility] [address]        Dear Colleague,    Thank you for referring your patient, Connor Emerson, to the [facility]. Please see a copy of my visit note below.        Video-Visit Details    Type of service:  Video Visit    Video Visit Start Time:1:06    Video End Time:1:40    Originating Location (pt. Location): Other work        Distant Location (provider location):  Off-site     Platform used for Video Visit: [facility]     Palliative Care Counseling Services - Initial Assessment    PLEASE NOTE:  THIS IS A MENTAL HEALTH NOTE.  OTHER PROVIDERS VIEWING THIS NOTE SHOULD USE THIS ONLY FOR UNDERSTANDING THE CONTEXT OF THE PATIENT S EXPERIENCE.  TOPICS DESCRIBED IN THIS NOTE SHOULD NOT BE REFERENCED TO THE PATIENT BY MEDICAL PROVIDERS    Connor Emerson is a 42 year old man with diagnosis of NSCLC , seen today for initial palliative care clinical assessment via [employer] video.  Connor was at his place of work for our session today.     Referred by: Dr. Brewer (palliative MD)    Presenting Issues: Coping with illness and Anxiety    Preferred Name: Connor    Mental Status Exam: (List all that apply)      Appearance: Appropriate      Eye Contact: Good       Orientation: Yes, x4      Mood: Normal      Affect: Appropriate      Thought Content: Clear         Thought Form: Logical      Psychomotor Behavior: Normal    Family:       Marital status:     Years :     Years together: 25     Name of spouse/partner: Kylie       Children: 4 children ages 25, 22, 19 and 15.  Youngest lives with disabilities and Kylie is her caregiver.       Parents: Mother , father living      Siblings: not asked      Other:     Support system: Family--identifies his children as his main support.  He and Kylie \"have grown apart\" and Connor notes that they have had very little communication for the past several months despite living " "together.     Living situation: Apartment   Difficulty accessing and/or getting around living space: No   Other concerns: No     Employment history:      Current employment status:  Employed full time         Kind of work:        Spouses/SO current employment status: at home with youngest.  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    Education highest level: not asked     Financial:       Descriptor: Tight \"we're getting by, but it's tight\"      Health insurance: Medical assistance    Legal concerns: No       Area(s) of concern: Not applicable    Health Care Directive: Has one:  No       If yes, copy in EMR: Not Available       Basic information regarding health care directive provided: Yes       Health Care Agent(s): No health care directive:  Surrogate  health care decision maker is per legal succession -- Connor is aware that his wife would be his decision maker unless a health care directive appoints someone else. He voices that this is ok for now.       POLST? no    Medical History/Issues (patient account): Diagnosed with lung cancer after he presented to the ED with respiratory distress in January 2022.  Admitted to the hospital and told of his diagnosis shortly before he was discharged 5 days later. Had gamma knife, brain surgery and chemo.  \"Chemo was rough\" due to difficult side effects including pain.  Palliative then got involved and he reports that the pain is much better managed now.     .  Recently told by team that he has a tumor pressing against side of his head; prescribed steroids and back on chemo. Was not sure if he wanted to go back on chemo because the side effects were so difficult the first time, but palliative has helped with the pain and it is managed at this time.     Connor reports he was told that if he did not do more chemotherapy he would be measuring time in months.  Now that he is on chemo \"I really don't know.  I don't know " "where I am with all of this\"    He has another set of scans scheduled in a few weeks.     Pain/Discomfort Issues: Pain, Fatigue, Sleep problems and Anxiety recently developed insomnia.  Describes difficulty falling asleep as well as onset of feeling of dread or fear something bad is about to happen, will sometimes \"burst into tears\"     Coping: \"things are rough right now\" -- difficulty in his marriage \"we're not really talking\" \"I guess we grew apart\", increased anxiety, insomnia; sense \"I'm not me\"     Sleep: recently developed insomnia -- possible he is having anxiety attacks?     Sexual Health/Intimacy: not directly asked but he shares over the course of the session today that he feels emotionally disconnected from his wife and has for the past several months.     Mental Health History and Current Review of Symptoms (patient account):     Depression in past?: No     Treatment in past?: was prescribed fluoxetine by his primary \"a while ago\"-- does not take it now.  Shares it was helpful in the past.   Treatment currently?:      Depression symptoms currently?:  - Anhedonia:  Yes  Enjoys time with family, does not have energy for hobbies like carpentry \"I'll go into my wood shop and just be overwhelmed\"   - Hopeless or down mood:  No -- endorses down mood, but also clear he does not feel hopeless \"I don't think I'm depressed\"   - Sleep problems (too much or too little):  Yes -- recent onset of insomnia.    - Fatigue:  Yes  Related to illness, sleep difficulties    - Appetite (too much or too little):  Not asked   - Excessively negative self perception:  No  - Trouble concentrating:  Yes  - Motor slowness:  No  - Current and/or recent thoughts of suicide:  No    Suicide risk screen:  - Past thoughts of suicide?  No  - Past attempts to end own life?  No  - If yes, how? No history  - Protective factors currently? Family, not depressed, no history   - Safety plan needed currently? Not indicated at this time    Anxiety " "in past?: Yes   Treatment in past?  Yes medication  Treatment currently?  No     Anxiety symptoms currently?  - Nervous, on edge:  Yes  - Sleep problems:  Yes  - Worrying a lot/hard to manage worries:  Yes  Specific recent areas of worry/fear/concern: worries about illness  - Tense, hard to relax:  No  - Feeling restless, hard to sit still:  No  - Irritable:  Yes  - Feeling of dread/ afraid that something awful may happen:  Yes  - How long?   - Impacts on daily life? Irritability in particular has been impacting personal relationships both at home and at his place of work \"my boss told me I think I'm talking, but I'm actually yelling\" also describes sense \"this isn't me\"     Struggling with uncertainty \"should I be planning my ?\"     Any other mental health diagnosis or symptoms in past?:  No    Any family history of mental health concerns?  No      Positive Bipolar Disorder screen?  No   Positive Thought Disorder screen?  Not asked   History of learning difficulties?  Not asked       Grief vs Depression:  Endorses symptoms for: Grief    Psychological Trauma and/or Major Losses:   None shared today     Nightmares?    na  Thought about when not wanting to think about? na  Tried hard not to think about it? na  Avoided situations that reminded you of it? na  [place] constantly on guard, watchful, or easily startled? na  Felt numb or detached from others, activities, or your surroundings? na    Safety Screen:  History of being harmed or controlled by someone close to you?  No  Being hurt or controlled by someone close to you?  No  Worried will be harmed in future?  No  Worried will harm someone else in future?  No    CD History:   Using alcohol actively? no    Amount per week:   Current concerns (self or other) about alcohol or drug use? No  Past concerns and/or CD treatment? No  Has \"med card\"-- uses edibles     Medications:   Any current concerns? No  Taking as prescribed currently? No     Rebeka/Spirituality:      " " Belong to a luciano community: No     Specify:        Identify with a particular Mosque: Raised Zoroastrian; does not practice, \"left that when I was 16\" but since diagnosis has felt compelled to re-connect with Mosque in some way.  He is not sure how      Identify as spiritual: Yes -- in sense of trying to make sense of this challenge in his life at this time.       How find expression: not explored today    Hope:      What do you hope for:    Difficulty answering this question today because \"it's really day by day\" for right now.  Scans will hopefully give some clarity in a few weeks, but difficult to know what to hope for when we do not know how his cancer is responding to treatment.        What gives you hope:    Rest          Perceived Needs: Emotional processing, possible couples counseling, goals of are support, anxiety support,     Resource needs/Referrals: None    Assessment:    Clinical Assessment Summary     Connor Emerson is a 42 year old man. This patient was referred by Dr. Brewer for initial palliative care clinical social work assessment and attend the interview today via [employer] video. They are diagnosed with metastatic NSCLC and in addition to their medical diagnosis also meets criteria for anxiety disorder, possible panic disorder. Their symptoms include persistent feelings of worry, feeling of dread / like something awful is about to happen followed by tearfulness that are contributing to sleep disturbance, irritability, difficulty focusing on hobbies  . These symptoms began: Connor reports he was prescribed fluoxetine for anxiety \"a while back\" no longer takes this med. Does not report history of depression.  No reports of SI; He has experienced functional impairment in coping, sleep, and personal relationships as well as working relationships for the last several montsh. It appears that contributing factors for their anxiety include: recently received news that he has another brain met \"that's " "pressing against head -- he had to start chemotherapy and he on steroids.  Uncertainty about prognosis and what to expect.  Another scan is scheduled for a few weeks \"I really don't know where I am with this\". Has 4 children, 2 under the age of 18.  His wife is primary caregiver to their youngest child who is disabled.       Connor presents as a 42 year old man, facing anxiety, grief, and metastatic lung cancer with brain mets. Their relationships consist of his wife and children.  He reports in recent months there has been strain on his relationship with his wife.  They identify as a part of [not discussed] culture. Their medical condition has the potential to influence their mental health in the following ways: ongoing anxiety and irritability, ongoing issues with sleep.       Other mental health diagnoses that were considered include: PTSD, Major depressive disorder, adjustment disorder . The symptoms related to these possible diagnoses can currently be explained by anxiety disorder.   It is medically necessary for this client to receive outpatient psychotherapy services at this time. If their current mental health symptoms go untreated it is likely that distress will continue and impact significant relationships and quality of life. My recommendations for services for this client include psychotherapy, CBT, narrative therapy, couples counseling.  I have have also notified Dr Duval as Connor shares he is interested in starting an anti anxiety medication to help with symptoms. The client's prognosis from a mental health perspective is good.    Intervention: Initial palliative care counseling / clinical social work evaluation was conducted.  Palliative Care Counseling interventions available were discussed, including counseling related to serious illness, behavioral interventions for symptom management, consultation regarding goals of care/health care directive/POLST, and other interventions specific to the " patient's situation or concerns.     Plan: Will have schedulers reach out to set up follow up sessions.      DSM5 Diagnoses:   300.02 (F41.1) Generalized Anxiety Disorder    Time Spent with Patient/Family: 34 minutes   (Start 1:06, end 1:40)    LISETH Barahona, EDUIN     DO NOT SEND ANY LETTERS                Again, thank you for allowing me to participate in the care of your patient.        Sincerely,        EDUIN Barahona

## 2023-05-09 ENCOUNTER — TELEPHONE (OUTPATIENT)
Dept: ONCOLOGY | Facility: CLINIC | Age: 43
End: 2023-05-09
Payer: MEDICAID

## 2023-05-09 DIAGNOSIS — I67.89 RADIATION THERAPY INDUCED BRAIN NECROSIS: Primary | ICD-10-CM
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
## 2023-05-09 DIAGNOSIS — C34.31 MALIGNANT NEOPLASM OF LOWER LOBE OF RIGHT LUNG (H): ICD-10-CM

## 2023-05-09 NOTE — ORAL ONC MGMT
Called patient to confirm he is still taking 180 mg Alunbrig daily and that it is still going well, and how much supply he has on hand.     Had to leave a voicemail, no drug names were mentioned.    Patient will see Chelsea Villegas on Friday - IB from Oral Chemo team forwarded to her to sign 180mg order if appropriate as Dr Persaud is out of office.    Vanessa Diaz, PharmD, [facility]  Oral Chemotherapy Monitoring Program  [facility]  [phone]

## 2023-05-09 NOTE — PROGRESS NOTES
I spoke to Connor and he has picked up the Ambien I ordered earlier.  I also addressed the issues Lanie GUPTA uncovered in terms of anxiety and some irritability.  He had a good response to prozac many years ago and is willing to try lexapro.  I sent in a prescription for lexapro 10 mg po q am.  He knows it may not be effective immediately and that it may interfere with sleep so he will use it first thing in the morning.  He also understands that a lot of his sleep issues and some of the irritability are likely due to the steroids he is currently on.  He'll let us know on Wednesday or Thursday how his sleeping is going.    Ajith Brewer MD MS FAAFP  Text me via [employer].  [facility]  Office [phone]  Fax [phone]

## 2023-05-17 ENCOUNTER — ONCOLOGY VISIT (OUTPATIENT)
Dept: ONCOLOGY | Facility: CLINIC | Age: 43
End: 2023-05-17
Attending: NURSE PRACTITIONER
Payer: COMMERCIAL

## 2023-05-17 ENCOUNTER — HOSPITAL ENCOUNTER (INPATIENT)
Facility: CLINIC | Age: 43
LOS: 3 days | Discharge: HOME | End: 2023-05-20
Attending: FAMILY MEDICINE | Admitting: INTERNAL MEDICINE
Payer: COMMERCIAL

## 2023-05-17 ENCOUNTER — APPOINTMENT (OUTPATIENT)
Dept: GENERAL RADIOLOGY | Facility: CLINIC | Age: 43
End: 2023-05-17
Attending: FAMILY MEDICINE
Payer: COMMERCIAL

## 2023-05-17 ENCOUNTER — APPOINTMENT (OUTPATIENT)
Dept: CT IMAGING | Facility: CLINIC | Age: 43
End: 2023-05-17
Attending: FAMILY MEDICINE
Payer: COMMERCIAL

## 2023-05-17 ENCOUNTER — NURSE TRIAGE (OUTPATIENT)
Dept: ONCOLOGY | Facility: CLINIC | Age: 43
End: 2023-05-17
Payer: MEDICAID

## 2023-05-17 ENCOUNTER — MYC MEDICAL ADVICE (OUTPATIENT)
Dept: ONCOLOGY | Facility: CLINIC | Age: 43
End: 2023-05-17
Payer: MEDICAID

## 2023-05-17 ENCOUNTER — TELEPHONE (OUTPATIENT)
Dept: PHARMACY | Facility: CLINIC | Age: 43
End: 2023-05-17

## 2023-05-17 ENCOUNTER — NURSE TRIAGE (OUTPATIENT)
Dept: ONCOLOGY | Facility: CLINIC | Age: 43
End: 2023-05-17

## 2023-05-17 ENCOUNTER — APPOINTMENT (OUTPATIENT)
Dept: LAB | Facility: CLINIC | Age: 43
End: 2023-05-17
Attending: NURSE PRACTITIONER
Payer: COMMERCIAL

## 2023-05-17 VITALS
OXYGEN SATURATION: 96 % | WEIGHT: 205.91 LBS | HEIGHT: 69 IN | TEMPERATURE: 98.4 F | DIASTOLIC BLOOD PRESSURE: 97 MMHG | RESPIRATION RATE: 16 BRPM | SYSTOLIC BLOOD PRESSURE: 141 MMHG | HEART RATE: 130 BPM | BODY MASS INDEX: 30.5 KG/M2

## 2023-05-17 DIAGNOSIS — C79.31 MALIGNANT NEOPLASM METASTATIC TO BRAIN (H): ICD-10-CM

## 2023-05-17 DIAGNOSIS — C34.31 MALIGNANT NEOPLASM OF LOWER LOBE OF RIGHT LUNG (H): ICD-10-CM

## 2023-05-17 DIAGNOSIS — C34.91: ICD-10-CM
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
## 2023-05-17 DIAGNOSIS — Z20.822 CONTACT WITH AND (SUSPECTED) EXPOSURE TO COVID-19: ICD-10-CM

## 2023-05-17 DIAGNOSIS — C34.31 MALIGNANT NEOPLASM OF LOWER LOBE OF RIGHT LUNG (H): Primary | ICD-10-CM

## 2023-05-17 DIAGNOSIS — I67.89 RADIATION THERAPY INDUCED BRAIN NECROSIS: ICD-10-CM

## 2023-05-17 DIAGNOSIS — C78.01 MALIGNANT NEOPLASM METASTATIC TO BOTH LUNGS (H): ICD-10-CM

## 2023-05-17 DIAGNOSIS — E11.65 TYPE 2 DIABETES MELLITUS WITH HYPERGLYCEMIA, WITHOUT LONG-TERM CURRENT USE OF INSULIN (H): ICD-10-CM

## 2023-05-17 DIAGNOSIS — C78.02 MALIGNANT NEOPLASM METASTATIC TO BOTH LUNGS (H): ICD-10-CM

## 2023-05-17 DIAGNOSIS — Z79.4 TYPE 2 DIABETES MELLITUS WITH HYPERGLYCEMIA, WITH LONG-TERM CURRENT USE OF INSULIN (H): ICD-10-CM

## 2023-05-17 DIAGNOSIS — E11.65 TYPE 2 DIABETES MELLITUS WITH HYPERGLYCEMIA, WITH LONG-TERM CURRENT USE OF INSULIN (H): ICD-10-CM

## 2023-05-17 DIAGNOSIS — Z77.22 CONTACT WITH AND (SUSPECTED) EXPOSURE TO ENVIRONMENTAL TOBACCO SMOKE (ACUTE) (CHRONIC): ICD-10-CM

## 2023-05-17 DIAGNOSIS — Z91.148 NONCOMPLIANCE WITH MEDICATION REGIMEN: ICD-10-CM

## 2023-05-17 DIAGNOSIS — C79.31 MALIGNANT NEOPLASM METASTATIC TO BRAIN (H): Primary | ICD-10-CM

## 2023-05-17 DIAGNOSIS — R73.9 HYPERGLYCEMIA: ICD-10-CM

## 2023-05-17 DIAGNOSIS — E86.0 DEHYDRATION: ICD-10-CM

## 2023-05-17 DIAGNOSIS — C34.92 PRIMARY MALIGNANT NEOPLASM OF LUNG, LEFT (H): ICD-10-CM

## 2023-05-17 DIAGNOSIS — C79.31 METASTASIS TO BRAIN (H): ICD-10-CM

## 2023-05-17 LAB
ALBUMIN SERPL BCG-MCNC: 4.4 G/DL (ref 3.5–5.2)
ALBUMIN UR-MCNC: NEGATIVE MG/DL
ALP SERPL-CCNC: 95 U/L (ref 40–129)
ALT SERPL W P-5'-P-CCNC: 19 U/L (ref 10–50)
ANION GAP SERPL CALCULATED.3IONS-SCNC: 17 MMOL/L (ref 7–15)
ANION GAP SERPL CALCULATED.3IONS-SCNC: 22 MMOL/L (ref 7–15)
APPEARANCE UR: CLEAR
AST SERPL W P-5'-P-CCNC: 17 U/L (ref 10–50)
B-OH-BUTYR SERPL-SCNC: 1.8 MMOL/L
BASOPHILS # BLD AUTO: 0 10E3/UL (ref 0–0.2)
BASOPHILS # BLD AUTO: 0.1 10E3/UL (ref 0–0.2)
BASOPHILS NFR BLD AUTO: 0 %
BASOPHILS NFR BLD AUTO: 0 %
BILIRUB SERPL-MCNC: 0.9 MG/DL
BILIRUB UR QL STRIP: NEGATIVE
BUN SERPL-MCNC: 27.5 MG/DL (ref 6–20)
BUN SERPL-MCNC: 28.6 MG/DL (ref 6–20)
C PNEUM DNA SPEC QL NAA+PROBE: NOT DETECTED
CALCIUM SERPL-MCNC: 10 MG/DL (ref 8.6–10)
CALCIUM SERPL-MCNC: 9.9 MG/DL (ref 8.6–10)
CHLORIDE SERPL-SCNC: 87 MMOL/L (ref 98–107)
CHLORIDE SERPL-SCNC: 88 MMOL/L (ref 98–107)
COLOR UR AUTO: ABNORMAL
CREAT SERPL-MCNC: 0.58 MG/DL (ref 0.67–1.17)
CREAT SERPL-MCNC: 0.74 MG/DL (ref 0.67–1.17)
CREAT SERPL-MCNC: 0.79 MG/DL (ref 0.67–1.17)
DEPRECATED HCO3 PLAS-SCNC: 20 MMOL/L (ref 22–29)
DEPRECATED HCO3 PLAS-SCNC: 25 MMOL/L (ref 22–29)
EOSINOPHIL # BLD AUTO: 0.2 10E3/UL (ref 0–0.7)
EOSINOPHIL # BLD AUTO: 0.2 10E3/UL (ref 0–0.7)
EOSINOPHIL NFR BLD AUTO: 1 %
EOSINOPHIL NFR BLD AUTO: 1 %
ERYTHROCYTE [DISTWIDTH] IN BLOOD BY AUTOMATED COUNT: 12.1 % (ref 10–15)
ERYTHROCYTE [DISTWIDTH] IN BLOOD BY AUTOMATED COUNT: 12.2 % (ref 10–15)
FLUAV H1 2009 PAND RNA SPEC QL NAA+PROBE: NOT DETECTED
FLUAV H1 RNA SPEC QL NAA+PROBE: NOT DETECTED
FLUAV H3 RNA SPEC QL NAA+PROBE: NOT DETECTED
FLUAV RNA SPEC QL NAA+PROBE: NOT DETECTED
FLUBV RNA SPEC QL NAA+PROBE: NOT DETECTED
GFR SERPL CREATININE-BSD FRML MDRD: >90 ML/MIN/1.73M2
GLUCOSE BLDC GLUCOMTR-MCNC: 176 MG/DL (ref 70–99)
GLUCOSE BLDC GLUCOMTR-MCNC: 199 MG/DL (ref 70–99)
GLUCOSE BLDC GLUCOMTR-MCNC: 223 MG/DL (ref 70–99)
GLUCOSE BLDC GLUCOMTR-MCNC: 254 MG/DL (ref 70–99)
GLUCOSE BLDC GLUCOMTR-MCNC: 258 MG/DL (ref 70–99)
GLUCOSE BLDC GLUCOMTR-MCNC: 293 MG/DL (ref 70–99)
GLUCOSE BLDC GLUCOMTR-MCNC: 325 MG/DL (ref 70–99)
GLUCOSE BLDC GLUCOMTR-MCNC: 358 MG/DL (ref 70–99)
GLUCOSE BLDC GLUCOMTR-MCNC: 441 MG/DL (ref 70–99)
GLUCOSE BLDC GLUCOMTR-MCNC: 476 MG/DL (ref 70–99)
GLUCOSE BLDC GLUCOMTR-MCNC: 514 MG/DL (ref 70–99)
GLUCOSE SERPL-MCNC: 563 MG/DL (ref 70–99)
GLUCOSE SERPL-MCNC: 627 MG/DL (ref 70–99)
GLUCOSE UR STRIP-MCNC: >=1000 MG/DL
HADV DNA SPEC QL NAA+PROBE: NOT DETECTED
HCO3 BLDV-SCNC: 25 MMOL/L (ref 21–28)
HCOV PNL SPEC NAA+PROBE: NOT DETECTED
HCT VFR BLD AUTO: 48.2 % (ref 40–53)
HCT VFR BLD AUTO: 48.9 % (ref 40–53)
HGB BLD-MCNC: 17.5 G/DL (ref 13.3–17.7)
HGB BLD-MCNC: 17.6 G/DL (ref 13.3–17.7)
HGB UR QL STRIP: NEGATIVE
HMPV RNA SPEC QL NAA+PROBE: NOT DETECTED
HPIV1 RNA SPEC QL NAA+PROBE: NOT DETECTED
HPIV2 RNA SPEC QL NAA+PROBE: NOT DETECTED
HPIV3 RNA SPEC QL NAA+PROBE: NOT DETECTED
HPIV4 RNA SPEC QL NAA+PROBE: NOT DETECTED
IMM GRANULOCYTES # BLD: 0.1 10E3/UL
IMM GRANULOCYTES # BLD: 0.1 10E3/UL
IMM GRANULOCYTES NFR BLD: 0 %
IMM GRANULOCYTES NFR BLD: 1 %
KETONES UR STRIP-MCNC: 60 MG/DL
LACTATE BLD-SCNC: 3.5 MMOL/L
LACTATE SERPL-SCNC: 1.3 MMOL/L (ref 0.7–2)
LEUKOCYTE ESTERASE UR QL STRIP: NEGATIVE
LYMPHOCYTES # BLD AUTO: 4.2 10E3/UL (ref 0.8–5.3)
LYMPHOCYTES # BLD AUTO: 4.4 10E3/UL (ref 0.8–5.3)
LYMPHOCYTES NFR BLD AUTO: 26 %
LYMPHOCYTES NFR BLD AUTO: 26 %
M PNEUMO DNA SPEC QL NAA+PROBE: NOT DETECTED
MAGNESIUM SERPL-MCNC: 2 MG/DL (ref 1.7–2.3)
MAGNESIUM SERPL-MCNC: 2.1 MG/DL (ref 1.7–2.3)
MCH RBC QN AUTO: 30.8 PG (ref 26.5–33)
MCH RBC QN AUTO: 31.3 PG (ref 26.5–33)
MCHC RBC AUTO-ENTMCNC: 36 G/DL (ref 31.5–36.5)
MCHC RBC AUTO-ENTMCNC: 36.3 G/DL (ref 31.5–36.5)
MCV RBC AUTO: 85 FL (ref 78–100)
MCV RBC AUTO: 87 FL (ref 78–100)
MONOCYTES # BLD AUTO: 0.7 10E3/UL (ref 0–1.3)
MONOCYTES # BLD AUTO: 0.8 10E3/UL (ref 0–1.3)
MONOCYTES NFR BLD AUTO: 5 %
MONOCYTES NFR BLD AUTO: 5 %
NEUTROPHILS # BLD AUTO: 10.8 10E3/UL (ref 1.6–8.3)
NEUTROPHILS # BLD AUTO: 11.4 10E3/UL (ref 1.6–8.3)
NEUTROPHILS NFR BLD AUTO: 67 %
NEUTROPHILS NFR BLD AUTO: 68 %
NITRATE UR QL: NEGATIVE
NRBC # BLD AUTO: 0 10E3/UL
NRBC # BLD AUTO: 0 10E3/UL
NRBC BLD AUTO-RTO: 0 /100
NRBC BLD AUTO-RTO: 0 /100
OSMOLALITY SERPL: 315 MMOL/KG (ref 275–295)
PCO2 BLDV: 25 MM HG (ref 40–50)
PH BLDV: 7.61 [PH] (ref 7.32–7.43)
PH UR STRIP: 6 [PH] (ref 5–7)
PLATELET # BLD AUTO: 267 10E3/UL (ref 150–450)
PLATELET # BLD AUTO: 306 10E3/UL (ref 150–450)
PO2 BLDV: 47 MM HG (ref 25–47)
POTASSIUM SERPL-SCNC: 3.7 MMOL/L (ref 3.4–5.3)
POTASSIUM SERPL-SCNC: 4.3 MMOL/L (ref 3.4–5.3)
POTASSIUM SERPL-SCNC: 4.8 MMOL/L (ref 3.4–5.3)
PROT SERPL-MCNC: 6.8 G/DL (ref 6.4–8.3)
RBC # BLD AUTO: 5.63 10E6/UL (ref 4.4–5.9)
RBC # BLD AUTO: 5.69 10E6/UL (ref 4.4–5.9)
RBC URINE: <1 /HPF
RSV RNA SPEC QL NAA+PROBE: NOT DETECTED
RSV RNA SPEC QL NAA+PROBE: NOT DETECTED
RV+EV RNA SPEC QL NAA+PROBE: NOT DETECTED
SAO2 % BLDV: 91 % (ref 94–100)
SARS-COV-2 RNA RESP QL NAA+PROBE: NEGATIVE
SODIUM SERPL-SCNC: 129 MMOL/L (ref 136–145)
SODIUM SERPL-SCNC: 130 MMOL/L (ref 136–145)
SP GR UR STRIP: 1.03 (ref 1–1.03)
SQUAMOUS EPITHELIAL: <1 /HPF
TROPONIN T SERPL HS-MCNC: 19 NG/L
UROBILINOGEN UR STRIP-MCNC: NORMAL MG/DL
WBC # BLD AUTO: 16.1 10E3/UL (ref 4–11)
WBC # BLD AUTO: 17 10E3/UL (ref 4–11)
WBC URINE: 2 /HPF

## 2023-05-17 PROCEDURE — 87635 SARS-COV-2 COVID-19 AMP PRB: CPT | Performed by: NURSE PRACTITIONER

## 2023-05-17 PROCEDURE — 99285 EMERGENCY DEPT VISIT HI MDM: CPT | Mod: 25 | Performed by: FAMILY MEDICINE

## 2023-05-17 PROCEDURE — 83605 ASSAY OF LACTIC ACID: CPT | Performed by: FAMILY MEDICINE

## 2023-05-17 PROCEDURE — 258N000003 HC RX IP 258 OP 636: Performed by: FAMILY MEDICINE

## 2023-05-17 PROCEDURE — 85025 COMPLETE CBC W/AUTO DIFF WBC: CPT | Performed by: NURSE PRACTITIONER

## 2023-05-17 PROCEDURE — 83930 ASSAY OF BLOOD OSMOLALITY: CPT | Performed by: FAMILY MEDICINE

## 2023-05-17 PROCEDURE — 87486 CHLMYD PNEUM DNA AMP PROBE: CPT | Performed by: NURSE PRACTITIONER

## 2023-05-17 PROCEDURE — 80053 COMPREHEN METABOLIC PANEL: CPT | Performed by: NURSE PRACTITIONER

## 2023-05-17 PROCEDURE — 96360 HYDRATION IV INFUSION INIT: CPT | Performed by: FAMILY MEDICINE

## 2023-05-17 PROCEDURE — 250N000013 HC RX MED GY IP 250 OP 250 PS 637: Performed by: INTERNAL MEDICINE

## 2023-05-17 PROCEDURE — 36415 COLL VENOUS BLD VENIPUNCTURE: CPT | Performed by: NURSE PRACTITIONER

## 2023-05-17 PROCEDURE — 70450 CT HEAD/BRAIN W/O DYE: CPT

## 2023-05-17 PROCEDURE — 120N000002 HC R&B MED SURG/OB UMMC

## 2023-05-17 PROCEDURE — 99215 OFFICE O/P EST HI 40 MIN: CPT | Performed by: NURSE PRACTITIONER

## 2023-05-17 PROCEDURE — 84484 ASSAY OF TROPONIN QUANT: CPT | Performed by: FAMILY MEDICINE

## 2023-05-17 PROCEDURE — 85025 COMPLETE CBC W/AUTO DIFF WBC: CPT | Performed by: FAMILY MEDICINE

## 2023-05-17 PROCEDURE — 258N000003 HC RX IP 258 OP 636

## 2023-05-17 PROCEDURE — 83735 ASSAY OF MAGNESIUM: CPT | Performed by: NURSE PRACTITIONER

## 2023-05-17 PROCEDURE — 83735 ASSAY OF MAGNESIUM: CPT | Performed by: EMERGENCY MEDICINE

## 2023-05-17 PROCEDURE — 82962 GLUCOSE BLOOD TEST: CPT

## 2023-05-17 PROCEDURE — 93010 ELECTROCARDIOGRAM REPORT: CPT | Performed by: FAMILY MEDICINE

## 2023-05-17 PROCEDURE — 82310 ASSAY OF CALCIUM: CPT | Performed by: NURSE PRACTITIONER

## 2023-05-17 PROCEDURE — 82040 ASSAY OF SERUM ALBUMIN: CPT | Performed by: FAMILY MEDICINE

## 2023-05-17 PROCEDURE — 81001 URINALYSIS AUTO W/SCOPE: CPT | Performed by: FAMILY MEDICINE

## 2023-05-17 PROCEDURE — 99223 1ST HOSP IP/OBS HIGH 75: CPT | Performed by: INTERNAL MEDICINE

## 2023-05-17 PROCEDURE — 83605 ASSAY OF LACTIC ACID: CPT

## 2023-05-17 PROCEDURE — 99291 CRITICAL CARE FIRST HOUR: CPT | Mod: 25 | Performed by: FAMILY MEDICINE

## 2023-05-17 PROCEDURE — 36415 COLL VENOUS BLD VENIPUNCTURE: CPT | Performed by: FAMILY MEDICINE

## 2023-05-17 PROCEDURE — 84132 ASSAY OF SERUM POTASSIUM: CPT | Performed by: EMERGENCY MEDICINE

## 2023-05-17 PROCEDURE — 250N000011 HC RX IP 250 OP 636: Performed by: INTERNAL MEDICINE

## 2023-05-17 PROCEDURE — 87040 BLOOD CULTURE FOR BACTERIA: CPT | Performed by: FAMILY MEDICINE

## 2023-05-17 PROCEDURE — 82010 KETONE BODYS QUAN: CPT | Performed by: FAMILY MEDICINE

## 2023-05-17 PROCEDURE — G0463 HOSPITAL OUTPT CLINIC VISIT: HCPCS | Performed by: NURSE PRACTITIONER

## 2023-05-17 PROCEDURE — 250N000011 HC RX IP 250 OP 636: Performed by: FAMILY MEDICINE

## 2023-05-17 PROCEDURE — 36415 COLL VENOUS BLD VENIPUNCTURE: CPT | Performed by: EMERGENCY MEDICINE

## 2023-05-17 PROCEDURE — 82565 ASSAY OF CREATININE: CPT | Performed by: INTERNAL MEDICINE

## 2023-05-17 PROCEDURE — 250N000009 HC RX 250: Performed by: FAMILY MEDICINE

## 2023-05-17 PROCEDURE — 71045 X-RAY EXAM CHEST 1 VIEW: CPT

## 2023-05-17 PROCEDURE — 258N000002 HC RX IP 258 OP 250: Performed by: FAMILY MEDICINE

## 2023-05-17 RX ORDER — PROCHLORPERAZINE MALEATE 5 MG
10 TABLET ORAL EVERY 6 HOURS PRN
Status: DISCONTINUED | OUTPATIENT
Start: 2023-05-17 | End: 2023-05-20 | Stop reason: HOSPADM

## 2023-05-17 RX ORDER — SODIUM CHLORIDE 450 MG/100ML
1000 INJECTION, SOLUTION INTRAVENOUS CONTINUOUS
Status: DISCONTINUED | OUTPATIENT
Start: 2023-05-17 | End: 2023-05-19

## 2023-05-17 RX ORDER — ONDANSETRON 2 MG/ML
4 INJECTION INTRAMUSCULAR; INTRAVENOUS EVERY 6 HOURS PRN
Status: DISCONTINUED | OUTPATIENT
Start: 2023-05-17 | End: 2023-05-20 | Stop reason: HOSPADM

## 2023-05-17 RX ORDER — ENOXAPARIN SODIUM 100 MG/ML
40 INJECTION SUBCUTANEOUS EVERY 24 HOURS
Status: DISCONTINUED | OUTPATIENT
Start: 2023-05-17 | End: 2023-05-20 | Stop reason: HOSPADM

## 2023-05-17 RX ORDER — BISACODYL 5 MG
10 TABLET, DELAYED RELEASE (ENTERIC COATED) ORAL DAILY PRN
Status: DISCONTINUED | OUTPATIENT
Start: 2023-05-17 | End: 2023-05-20 | Stop reason: HOSPADM

## 2023-05-17 RX ORDER — DOCUSATE SODIUM 100 MG/1
100 CAPSULE, LIQUID FILLED ORAL 2 TIMES DAILY
Status: DISCONTINUED | OUTPATIENT
Start: 2023-05-17 | End: 2023-05-17

## 2023-05-17 RX ORDER — OXYCODONE HYDROCHLORIDE 5 MG/1
5-10 TABLET ORAL EVERY 4 HOURS PRN
Status: DISCONTINUED | OUTPATIENT
Start: 2023-05-17 | End: 2023-05-20 | Stop reason: HOSPADM

## 2023-05-17 RX ORDER — METFORMIN HCL 500 MG
1500 TABLET, EXTENDED RELEASE 24 HR ORAL
Status: DISCONTINUED | OUTPATIENT
Start: 2023-05-17 | End: 2023-05-18

## 2023-05-17 RX ORDER — DEXAMETHASONE 2 MG/1
2 TABLET ORAL
Status: DISCONTINUED | OUTPATIENT
Start: 2023-05-18 | End: 2023-05-17

## 2023-05-17 RX ORDER — CELECOXIB 100 MG/1
100 CAPSULE ORAL 2 TIMES DAILY
Status: DISCONTINUED | OUTPATIENT
Start: 2023-05-17 | End: 2023-05-20 | Stop reason: HOSPADM

## 2023-05-17 RX ORDER — NALOXONE HYDROCHLORIDE 0.4 MG/ML
0.4 INJECTION, SOLUTION INTRAMUSCULAR; INTRAVENOUS; SUBCUTANEOUS
Status: DISCONTINUED | OUTPATIENT
Start: 2023-05-17 | End: 2023-05-20 | Stop reason: HOSPADM

## 2023-05-17 RX ORDER — DEXTROSE MONOHYDRATE 25 G/50ML
25-50 INJECTION, SOLUTION INTRAVENOUS
Status: DISCONTINUED | OUTPATIENT
Start: 2023-05-17 | End: 2023-05-20 | Stop reason: HOSPADM

## 2023-05-17 RX ORDER — ONDANSETRON 4 MG/1
4 TABLET, ORALLY DISINTEGRATING ORAL EVERY 6 HOURS PRN
Status: DISCONTINUED | OUTPATIENT
Start: 2023-05-17 | End: 2023-05-20 | Stop reason: HOSPADM

## 2023-05-17 RX ORDER — PROCHLORPERAZINE 25 MG
25 SUPPOSITORY, RECTAL RECTAL EVERY 12 HOURS PRN
Status: DISCONTINUED | OUTPATIENT
Start: 2023-05-17 | End: 2023-05-20 | Stop reason: HOSPADM

## 2023-05-17 RX ORDER — SODIUM CHLORIDE 9 MG/ML
INJECTION, SOLUTION INTRAVENOUS
Status: COMPLETED
Start: 2023-05-17 | End: 2023-05-17

## 2023-05-17 RX ORDER — NALOXONE HYDROCHLORIDE 0.4 MG/ML
0.2 INJECTION, SOLUTION INTRAMUSCULAR; INTRAVENOUS; SUBCUTANEOUS
Status: DISCONTINUED | OUTPATIENT
Start: 2023-05-17 | End: 2023-05-20 | Stop reason: HOSPADM

## 2023-05-17 RX ORDER — ONDANSETRON 2 MG/ML
4 INJECTION INTRAMUSCULAR; INTRAVENOUS EVERY 30 MIN PRN
Status: DISCONTINUED | OUTPATIENT
Start: 2023-05-17 | End: 2023-05-17

## 2023-05-17 RX ORDER — PROPRANOLOL HYDROCHLORIDE 10 MG/1
20 TABLET ORAL 2 TIMES DAILY
Status: DISCONTINUED | OUTPATIENT
Start: 2023-05-17 | End: 2023-05-20 | Stop reason: HOSPADM

## 2023-05-17 RX ORDER — LISINOPRIL 40 MG/1
40 TABLET ORAL DAILY
Status: DISCONTINUED | OUTPATIENT
Start: 2023-05-17 | End: 2023-05-20 | Stop reason: HOSPADM

## 2023-05-17 RX ORDER — POTASSIUM CHLORIDE 7.45 MG/ML
10 INJECTION INTRAVENOUS ONCE
Status: COMPLETED | OUTPATIENT
Start: 2023-05-17 | End: 2023-05-17

## 2023-05-17 RX ORDER — POTASSIUM CHLORIDE 7.45 MG/ML
10 INJECTION INTRAVENOUS
Status: DISCONTINUED | OUTPATIENT
Start: 2023-05-17 | End: 2023-05-20 | Stop reason: HOSPADM

## 2023-05-17 RX ORDER — DEXTROSE MONOHYDRATE 25 G/50ML
25-50 INJECTION, SOLUTION INTRAVENOUS
Status: DISCONTINUED | OUTPATIENT
Start: 2023-05-17 | End: 2023-05-18

## 2023-05-17 RX ORDER — ALBUTEROL SULFATE 90 UG/1
2 AEROSOL, METERED RESPIRATORY (INHALATION) EVERY 6 HOURS PRN
Status: DISCONTINUED | OUTPATIENT
Start: 2023-05-17 | End: 2023-05-20 | Stop reason: HOSPADM

## 2023-05-17 RX ORDER — ZOLPIDEM TARTRATE 5 MG/1
5 TABLET ORAL
Status: DISCONTINUED | OUTPATIENT
Start: 2023-05-17 | End: 2023-05-20 | Stop reason: HOSPADM

## 2023-05-17 RX ORDER — LIDOCAINE 40 MG/G
CREAM TOPICAL
Status: DISCONTINUED | OUTPATIENT
Start: 2023-05-17 | End: 2023-05-20 | Stop reason: HOSPADM

## 2023-05-17 RX ORDER — METHADONE HYDROCHLORIDE 5 MG/1
5 TABLET ORAL EVERY EVENING
Status: DISCONTINUED | OUTPATIENT
Start: 2023-05-17 | End: 2023-05-20 | Stop reason: HOSPADM

## 2023-05-17 RX ORDER — DEXAMETHASONE 4 MG/1
4 TABLET ORAL
Status: DISCONTINUED | OUTPATIENT
Start: 2023-05-18 | End: 2023-05-18

## 2023-05-17 RX ORDER — NICOTINE POLACRILEX 4 MG
15-30 LOZENGE BUCCAL
Status: DISCONTINUED | OUTPATIENT
Start: 2023-05-17 | End: 2023-05-20 | Stop reason: HOSPADM

## 2023-05-17 RX ORDER — CITALOPRAM HYDROBROMIDE 20 MG/1
20 TABLET ORAL EVERY EVENING
Status: DISCONTINUED | OUTPATIENT
Start: 2023-05-17 | End: 2023-05-20 | Stop reason: HOSPADM

## 2023-05-17 RX ORDER — DEXAMETHASONE 4 MG/1
4 TABLET ORAL
Status: ON HOLD | COMMUNITY
End: 2023-05-20

## 2023-05-17 RX ORDER — BISACODYL 5 MG
5 TABLET, DELAYED RELEASE (ENTERIC COATED) ORAL DAILY PRN
Status: DISCONTINUED | OUTPATIENT
Start: 2023-05-17 | End: 2023-05-20 | Stop reason: HOSPADM

## 2023-05-17 RX ORDER — HYDROCHLOROTHIAZIDE 25 MG/1
25 TABLET ORAL DAILY
Status: DISCONTINUED | OUTPATIENT
Start: 2023-05-17 | End: 2023-05-20 | Stop reason: HOSPADM

## 2023-05-17 RX ADMIN — SODIUM CHLORIDE 1000 ML: 9 INJECTION, SOLUTION INTRAVENOUS at 13:29

## 2023-05-17 RX ADMIN — HUMAN INSULIN: 100 INJECTION, SOLUTION SUBCUTANEOUS at 20:37

## 2023-05-17 RX ADMIN — HYDROCHLOROTHIAZIDE 25 MG: 25 TABLET ORAL at 21:01

## 2023-05-17 RX ADMIN — HUMAN INSULIN 5.5 UNITS/HR: 100 INJECTION, SOLUTION SUBCUTANEOUS at 15:09

## 2023-05-17 RX ADMIN — POTASSIUM CHLORIDE 10 MEQ: 7.46 INJECTION, SOLUTION INTRAVENOUS at 15:52

## 2023-05-17 RX ADMIN — Medication 5 MG: at 23:49

## 2023-05-17 RX ADMIN — Medication 1000 ML: at 13:28

## 2023-05-17 RX ADMIN — SODIUM CHLORIDE 500 ML: 9 INJECTION, SOLUTION INTRAVENOUS at 15:55

## 2023-05-17 RX ADMIN — CELECOXIB 100 MG: 100 CAPSULE ORAL at 21:49

## 2023-05-17 RX ADMIN — LISINOPRIL 40 MG: 40 TABLET ORAL at 21:01

## 2023-05-17 RX ADMIN — SODIUM CHLORIDE 1000 ML: 4.5 INJECTION, SOLUTION INTRAVENOUS at 18:55

## 2023-05-17 RX ADMIN — CITALOPRAM HYDROBROMIDE 20 MG: 20 TABLET ORAL at 19:58

## 2023-05-17 RX ADMIN — DEXTROSE AND SODIUM CHLORIDE 1000 ML: 5; 450 INJECTION, SOLUTION INTRAVENOUS at 21:22

## 2023-05-17 RX ADMIN — PROPRANOLOL HYDROCHLORIDE 20 MG: 10 TABLET ORAL at 19:58

## 2023-05-17 RX ADMIN — SODIUM CHLORIDE 1000 ML: 9 INJECTION, SOLUTION INTRAVENOUS at 13:28

## 2023-05-17 RX ADMIN — ENOXAPARIN SODIUM 40 MG: 40 INJECTION SUBCUTANEOUS at 18:58

## 2023-05-17 ASSESSMENT — ACTIVITIES OF DAILY LIVING (ADL)
FALL_HISTORY_WITHIN_LAST_SIX_MONTHS: YES
CHANGE_IN_FUNCTIONAL_STATUS_SINCE_ONSET_OF_CURRENT_ILLNESS/INJURY: NO
DRESSING/BATHING_DIFFICULTY: NO
DIFFICULTY_EATING/SWALLOWING: NO
NUMBER_OF_TIMES_PATIENT_HAS_FALLEN_WITHIN_LAST_SIX_MONTHS: 1
ADLS_ACUITY_SCORE: 18
ADLS_ACUITY_SCORE: 35
TOILETING_ISSUES: NO
ADLS_ACUITY_SCORE: 35
EQUIPMENT_CURRENTLY_USED_AT_HOME: GRAB BAR, TUB/SHOWER;GRAB BAR, TOILET
WALKING_OR_CLIMBING_STAIRS_DIFFICULTY: NO
DOING_ERRANDS_INDEPENDENTLY_DIFFICULTY: NO
ADLS_ACUITY_SCORE: 35
WEAR_GLASSES_OR_BLIND: NO
ADLS_ACUITY_SCORE: 35
CONCENTRATING,_REMEMBERING_OR_MAKING_DECISIONS_DIFFICULTY: NO

## 2023-05-17 ASSESSMENT — PAIN SCALES - GENERAL: PAINLEVEL: NO PAIN (0)

## 2023-05-17 NOTE — TELEPHONE ENCOUNTER
"Oncology Nurse Triage - Reporting Symptoms  Situation:   Connor reporting the following symptoms: worsening wheezing, ringing in his head that he is worried is related to his brain tumor, fatigue, weakness, dehydration, R sided chest pain.    Background:   Treating Provider: Dr. Persaud,   DX: Malignant neoplasm lower lobe R lung  Date of last office visit: Chelsea Villegas CNP on 5/9/23--no show, cancelled apt on 5/4/23 with Chelsea, 4/24/23--had apt with Dr. Persaud  Date of next office visit: tomorrow with Chelsea Villegas CNP    Recent treatments: Yes: Pt has been on brigatinib for a while, on dexamethasone    Assessment  Onset of symptoms: One week  Pt reports worsening wheezing x 1 week, is using inhaler and it does help; has non-productive cough, R-sided chest pain, 7/10.  Fatigue, weakness, feels dehydrated, feels ringing in his head.  Has not taken any oxycodone for pain d/t having consumed alcohol past two nights.   Has been able to sleep well.  Feels he is very \"dry\" and can't get enough water. Drinking a lot of water. Does not think he is eating enough and has lost his appetite.  Says he has a fever, but has not taken his temperature. States his son said he felt warm yesterday.  No dysuria, no nausea/vomiting.     Pt has apt scheduled with Chelsea Villegas tomorrow but he does not feel this can wait until tomorrow. Pt sounds very anxious on the phone.     Recommendations:   Paged provider: [id number]: Chelsea Villegas--sent message to Chelsea with above pt concerns.   Per Chelsea, he can be scheduled in her 1115 apt slot w/labs prior    Called Connor to offer apt with Chelsea today.  Pt confirmed he can come to apt.  Updated Chelsea. Would like pt to come to the lab apt at 1030 today.  Message sent to [facility] to call pt and schedule.              "

## 2023-05-17 NOTE — NURSING NOTE
"Oncology Rooming Note    May 17, 2023 11:36 AM   Connor Emerson is a 43 year old male who presents for:    Chief Complaint   Patient presents with     Blood Draw     Labs drawn by RN in Lab via Right Arm VPT.      Oncology Clinic Visit     [facility] RETURN - NON SMALL CELL LUNG CANCER     Initial Vitals: BP (!) 141/97   Pulse (!) 130   Temp 98.4  F (36.9  C) (Oral)   Resp 16   Ht 1.753 m (5' 9.02\")   Wt 93.4 kg (205 lb 14.6 oz)   SpO2 96%   BMI 30.39 kg/m   Estimated body mass index is 30.39 kg/m  as calculated from the following:    Height as of this encounter: 1.753 m (5' 9.02\").    Weight as of this encounter: 93.4 kg (205 lb 14.6 oz). Body surface area is 2.13 meters squared.  No Pain (0) Comment: Data Unavailable   No LMP for male patient.  Allergies reviewed: Yes  Medications reviewed: Yes    Medications: Medication refills not needed today.  Pharmacy name entered into EPIC:    [facility] - [place], [place] - [address]  [facility] [place] [address] [place] [place] [address]   [facility] [place] [address] [id number] - [place], [place] - [address] [facility] [address] [place] [address] & [id number] - [place], [place] - [address] AT [facility] & [place] [id number]    Clinical concerns: Pulse 130 bpm.     Jose Cai LPN            "

## 2023-05-17 NOTE — H&P
[facility]    History and Physical - Hospitalist Service, GOLD TEAM        Date of Admission:  5/17/2023    Assessment & Plan      Connor Emerson is a very pleasant 43-year-old  male admitted on 5/17/2023.  Patient unfortunately carries a diagnosis of metastatic non-small cell lung carcinoma with brain metastases.  Patient takes PTA Decadron.  Patient is presenting to this facility today with hyperosmolar hyperglycemic state.  Patient was started on insulin drip in the emergency department.  Endocrinology will be consulted to manage transition to basal bolus insulin regimen.  Patient reports recent 15 pound unintended weight loss.  Patient reports tinnitus, periorbital edema, and anorexia.  Patient will be admitted to the hospitalist service for management of hyperosmolar hyperglycemic state.    #Insulin-requiring type 2 diabetes mellitus  #Hyperosmolar hyperglycemic state  - Continue PTA insulin regimen  - Most recent hemoglobin A1c equals 8.3%  - Endocrinology consultation for diabetes management  - Consistent carbohydrate diet  - High-dose correction scale NovoLog in the setting of Decadron    #Metastatic non-small cell lung carcinoma  #Brain metastases  #Tinnitus  #Periorbital edema  #Anorexia  - Oncology consultation for additional recommendations  - Per neurosurgery, metastatic brain lesions appear stable  - Patient reports approximately 15-pound unintended weight loss  - Nutrition consultation  - Continue PTA pain regimen    #Hypertension  - Continue PTA antihypertensive regimen  - Continue to closely monitor blood pressure  - Make any further medication adjustments as necessary       Diet: Combination Diet Regular Diet Adult; Moderate Consistent Carb (60 g CHO per Meal) Diet    DVT Prophylaxis: Enoxaparin (Lovenox) SQ  Heart Catheter: Not present  Lines: None     Cardiac Monitoring: None  Code Status: Full Code      Clinically Significant Risk Factors  "Present on Admission         # Hyponatremia: Lowest Na = 129 mmol/L in last 2 days, will monitor as appropriate     # Anion Gap Metabolic Acidosis: Highest Anion Gap = 22 mmol/L in last 2 days, will monitor and treat as appropriate      # Hypertension: Noted on problem list     # DMII: A1C = 8.3 % (Ref range: 0.0 - 5.6 %) within past 6 months    # Obesity: Estimated body mass index is 30.27 kg/m  as calculated from the following:    Height as of this encounter: 1.753 m (5' 9\").    Weight as of this encounter: 93 kg (205 lb).            Disposition Plan      Expected Discharge Date: 05/19/2023                  Kole Graham DO, MHS  Hospitalist Service, [facility]  Securely message with [employer] (more info)  Text page via [facility] Paging/Directory   See signed in provider for up to date coverage information    ______________________________________________________________________    Chief Complaint   Hyperosmolar hyperglycemic state.    History is obtained from the patient & electronic medical record.    History of Present Illness   Connor Emerson is a very pleasant 43-year-old  male admitted on 5/17/2023.  Patient unfortunately carries a diagnosis of metastatic non-small cell lung carcinoma with brain metastases.  Patient takes PTA Decadron.  Patient is presenting to this facility today with hyperosmolar hyperglycemic state.  Patient was started on insulin drip in the emergency department.  Endocrinology will be consulted to manage transition to basal bolus insulin regimen.  Patient reports recent 15 pound unintended weight loss.  Patient reports tinnitus, periorbital edema, and anorexia.  Patient will be admitted to the hospitalist service for management of hyperosmolar hyperglycemic state.    Past Medical History    Past Medical History:   Diagnosis Date     Atypical chest pain 12/02/2013     Cancer (H)      Complication of anesthesia      Diabetes (H)  "     GERD (gastroesophageal reflux disease) 2013     History of pulmonary embolism      HTN (hypertension) 2012     HTN, goal below 140/90 2013     Insomnia 2012     Mediastinal lymphadenopathy      Migraine headache 2013     Migraine with aura, without mention of intractable migraine without mention of status migrainosus      Pneumonia        Past Surgical History   Past Surgical History:   Procedure Laterality Date     BRONCHOSCOPY RIGID OR FLEXIBLE W/TRANSENDOSCOPIC ENDOBRONCHIAL ULTRASOUND GUIDED Bilateral 2022    Procedure: Right BRONCHOSCOPY, FIBEROPTIC, endobronchial ultrasound, pleural biopsy;  Surgeon: Dallin Agrawal MD;  Location: [facility] OR     INJECT BLOCK MEDIAL BRANCH CERVICAL/THORACIC/LUMBAR       INSERT CHEST TUBE Right 2022    Procedure: INSERTION, CATHETER, INTERCOSTAL, FOR DRAINAGE;  Surgeon: Dallin Agrawal MD;  Location: [facility] GI     INSERT CHEST TUBE Right 3/9/2022    Procedure: INSERTION, CATHETER, INTERCOSTAL, FOR DRAINAGE;  Surgeon: Sushila Antonio MD;  Location: [facility] GI     IR CHEST TUBE REMOVAL TUNNELED RIGHT  2022     OPTICAL TRACKING SYSTEM CRANIOTOMY, EXCISE TUMOR, COMBINED Left 2022    Procedure: Left stealth craniotomy for tumor resection with motor mapping;  Surgeon: Stephen Moran MD;  Location:  OR     ORTHOPEDIC SURGERY      Ganesh. Rotator cuff repair.     PLEUROSCOPY N/A 2022    Procedure: Pleuroscopy with Pleural Biopsy;  Surgeon: Dallin Agrawal MD;  Location: [facility] OR       Prior to Admission Medications   Prior to Admission Medications   Prescriptions Last Dose Informant Patient Reported? Taking?   Continuous Blood Gluc Sensor (FREESTYLE IRENE 2 SENSOR) MISC   No No   Si each every 14 days Apply as directed per  instructions   albuterol (PROAIR HFA/PROVENTIL HFA/VENTOLIN HFA) 108 (90 Base) MCG/ACT inhaler Past Week  No Yes   Sig: Inhale 2 puffs into the lungs every 6 hours as needed for shortness of breath,  wheezing or cough   alcohol swab prep pads  Self No No   Sig: Use to swab area of injection/jabier as directed.   blood glucose (NO BRAND SPECIFIED) test strip  Self No No   Sig: Use to test blood sugar 2 times daily or as directed. To accompany: Blood Glucose Monitor Brands: per insurance.   blood glucose calibration (NO BRAND SPECIFIED) solution  Self No No   Sig: To accompany: Blood Glucose Monitor Brands: per insurance.   blood glucose monitoring (NO BRAND SPECIFIED) meter device kit  Self No No   Sig: Use to test blood sugar 2 times daily or as directed. Preferred blood glucose meter OR supplies to accompany: Blood Glucose Monitor Brands: per insurance.   brigatinib (ALUNBRIG) 30 MG TABS tablet 5/16/2023  No Yes   Sig: Take 3 tablets (90 mg) by mouth daily   celecoxib (CELEBREX) 100 MG capsule 5/16/2023  No Yes   Sig: Take 1 capsule (100 mg) by mouth 2 times daily   citalopram (CELEXA) 20 MG tablet 5/16/2023  No Yes   Sig: Take 1 tablet (20 mg) by mouth every evening   dexamethasone (DECADRON) 2 MG tablet 5/16/2023  No Yes   Sig: Take 2 tablets (4 mg) by mouth daily (with breakfast) for 14 days, THEN 1 tablet (2 mg) daily (with breakfast) for 14 days.   escitalopram (LEXAPRO) 10 MG tablet Not Taking  No No   Sig: Take 1 tablet (10 mg) by mouth daily   Patient not taking: Reported on 5/17/2023   hydrochlorothiazide (HYDRODIURIL) 25 MG tablet 5/16/2023  No Yes   Sig: Take 1 tablet (25 mg) by mouth daily   insulin aspart (NOVOLOG PEN) 100 UNIT/ML pen Past Month  No Yes   Sig: Inject 2 Units Subcutaneous 3 times daily (before meals) Plus adjustment scale 1:20 for blood sugars >150mg/dL max daily 50 units   insulin glargine (LANTUS PEN) 100 UNIT/ML pen Past Month  No Yes   Sig: Inject 10-20 Units Subcutaneous daily On hold 11/22. Increase as directed, Max daily dose 100 units   insulin pen needle (31G X 8 MM) 31G X 8 MM miscellaneous  Self No No   Sig: Use one pen needles daily or as directed.   lisinopril (ZESTRIL)  40 MG tablet 5/16/2023  No Yes   Sig: Take 1 tablet (40 mg) by mouth daily   metFORMIN (GLUCOPHAGE XR) 500 MG 24 hr tablet Past Month  No Yes   Sig: Take 3 tablets daily.   methadone (DOLOPHINE) 5 MG tablet 5/16/2023  No Yes   Sig: Take 0.5 tablets (2.5 mg) by mouth every morning AND 1 tablet (5 mg) At Bedtime.   naloxone (NARCAN) 4 MG/0.1ML nasal spray Unknown  No Yes   Sig: Spray 1 spray (4 mg) into one nostril alternating nostrils as needed for opioid reversal every 2-3 minutes until assistance arrives   oxyCODONE (ROXICODONE) 5 MG tablet Past Week  No Yes   Sig: Take 1-2 tablets (5-10 mg) by mouth every 4 hours as needed for moderate to severe pain   propranolol (INDERAL) 20 MG tablet 5/16/2023  No Yes   Sig: Take 1 tablet (20 mg) by mouth 2 times daily   zolpidem (AMBIEN) 5 MG tablet Past Week  No Yes   Sig: Take 1 tablet (5 mg) by mouth nightly as needed for sleep      Facility-Administered Medications: None        Physical Exam   Vital Signs: Temp: 98.6  F (37  C) Temp src: Oral BP: (!) 136/94 Pulse: 86   Resp: 18 SpO2: 96 %      Weight: 205 lbs 0 oz    GENERAL: Alert and oriented x 3; patient is pleasant; lethargic.  HEENT: Normocephalic; atraumatic; PERRLA; MMM; periorbital edema.  CV: RRR; normal S1, S2; no rubs, murmurs, or gallops.  RESP: Lung fields clear to aucultation B/L; no wheezing or crepitations.  GI: Abdomen is soft, nontender, nondistended; no organomegaly; normal bowel sounds.  : Deferred genital examination.   MSK: No clubbing, cyanosis, or edema.  DERM: Skin is intact; no rash, lesions, or skin breakdown.  NEURO: No focal deficits appreciated; strength & sensorium are grossly intact.  PSYCH: No active hallucinations; affect, insight appear within normal limits.    Medical Decision Making       75 MINUTES SPENT BY ME on the date of service doing chart review, history, exam, documentation & further activities per the note.      Data     I have personally reviewed the following data over the  past 24 hrs:    17.0 (H)  \   17.6   / 306     130 (L) 88 (L) 28.6 (H) /  293 (H)   4.3 20 (L) 0.79 \       ALT: 19 AST: 17 AP: 95 TBILI: 0.9   ALB: 4.4 TOT PROTEIN: 6.8 LIPASE: N/A       Trop: 19 BNP: N/A       Procal: N/A CRP: N/A Lactic Acid: 1.3         Imaging results reviewed over the past 24 hrs:   Recent Results (from the past 24 hour(s))   Head CT w/o contrast    Narrative    CT OF THE HEAD WITHOUT CONTRAST  5/17/2023 2:04 PM     COMPARISON: Brain MRI 4/20/2023.    HISTORY:  Known metastasis, headache.    TECHNIQUE: Axial CT images of the head from the skull base to the  vertex were acquired without IV contrast.    FINDINGS: Known metastatic nodule in the inferior aspect of the right  frontal lobe with surrounding vasogenic edema again noted. Presumed  metastatic nodule in the superior aspect of the left cerebral  hemisphere again noted. Postoperative change consisting of a left  frontal craniotomy and biopsy site in the left frontal lobe again  noted. Gray-white differentiation of the brain is otherwise normal.    The ventricles and basal cisterns are within normal limits in  configuration. There is no midline shift. There are no extra-axial  fluid collections.    No intracranial hemorrhage or recent infarct.    The visualized paranasal sinuses are well-aerated. There is no  mastoiditis. There are no fractures of the visualized bones.       Impression    IMPRESSION:  1. Stable head CT demonstrating presumed metastatic nodules in the  inferior right frontal lobe and superior aspect of the left cerebellar  hemisphere as well as postoperative changes to the high left frontal  lobe.  2. No evidence for acute intracranial pathology.      Radiation dose for this scan was reduced using automated exposure  control, adjustment of the mA and/or kV according to patient size, or  iterative reconstruction technique.    DENIZ ABDULLAHI MD         SYSTEM ID:  [id number]   XR Chest 1 View    Narrative    CHEST ONE VIEW   5/17/2023 2:10 PM     HISTORY:  Dyspnea.    COMPARISON: 2/9/2023.      Impression    IMPRESSION: Negative chest. Lungs clear.    RINA GIRALDO MD         SYSTEM ID:  [id number]

## 2023-05-17 NOTE — Clinical Note
5/17/2023         RE: Connor Emerson  [address]  [facility] [address]        Dear Colleague,    Thank you for referring your patient, Connor Emerson, to the [facility]. Please see a copy of my visit note below.        MEDICAL ONCOLOGY FOLLOW UP NOTE    PATIENT NAME: Connor Emerson  ENCOUNTER DATE: 5/17/2023    Care Team  Primary Oncologist: Bassam Persaud MD    REASON FOR CURRENT VISIT: F/u of lung cancer    HISTORY OF PRESENT ILLNESS:  Mr. Connor Emerson is a 42 year old  male who is a non-smoker with PMHx of T2DM, HTN with metastatic NSCLC comes for follow up     Oncologic Hx:    Diagnosis:   Stage IV NSCLC, Rt lung adenocarcinoma with metastasis to pleura, mediastinum , rt pleural effusion and brain diagnosed 1/2022 (AJCC 8th edition)  PD-L1 TPS 2-3% by [place]   NGS [facility] panel-EML4:ALK rearragement  NGS Guardant- GNAS R201H, KRAS K5E- No ALK    Treatment:    2/23/2022- current: Brigatinib. Dose reduced to 60 mg due to cough after two days of 90 mg daily -->now back on 90 mg    3/2/22- 12/2022 Alectinib 300 mg BID (Dose reduced to 450 mg BID from 600 mg BID due to grade 3 myalgias 3/21/22, again reduced to 300 mg BID 9/28/22)    )  Past:  2/15/22- GK to 11 brain lesions  6/28/22- Craniotomy, resection  9/28/22-10/26- Bevacizumab for radiation necrosis (stopped due to PE)      Intent of treatment: Palliative    Oncologic course:  1/19/22 to 1/22/22-Admitted to [facility] for 2 week progressive SOB secondary to have large rt sided pleural effusion, needing thoracentesis x2 (1.7L and 2.0 L removed), cytology positive for malignancy, adenocarcinoma.   1/26/22- Rt pleural mass biopsy-Dr. Agrawal--POSITIVE FOR ADENOCARCINOMA CONSISTENT WITH LUNG PRIMARY, admixed with mesothelial hyperplasia and inflammatory infiltrate (+ TTF-1 and CK 7;  negative  p40, calretinin and WT-1. PAX8 immunostain focal +). 4th thoracentesis done simultaneously - 3L approx removed.   2/1/22- PET/CT-Right  lower lobe central infiltrative FDG avid 8.2 x 9.6 cm mass representing a primary lung adenocarcinoma. Ipsilateral right perihilar, bilateral pretracheal, subcarinal and superior mediastinal michele metastases. Contralateral mildly FDG avid few lung nodules are suspicious for contralateral metastasis. At least 3 intracranial metastases in the right frontal lobe, left frontal lobe and left cerebellar hemisphere. Nonspecific mild diffuse bone marrow uptake. Further evaluation with a spine MRI could be considered to rule out early marrow infiltration. This could also be seen with red marrow conversion.  2/5/22-  Brain MRI- At least 9 intracranial metastases as detailed above. The dominant lesions involving the orbital right frontal lobe, the posterior left middle frontal gyrus, anterior right temporal lobe and in the left cerebellar hemisphere have surrounding moderate vasogenic type edema.  2/15/22- Saw Dr. Arango from Rad Onc- Rcd GK to 12 lesion in bran  2/16/22- Pleurex placement   3/2/22- Started Alectinib 600 mg BID  3/21/22- Dose reduced to 450 mg BID due to grade 3 myalgias and fatigue  4/2/22 to 4/5/22- Admitted at [facility] for- Severe sepsis due to MSSA infection of right PleurX catheter s/p removal- He presented with onset of pain at tube site starting 4/1; at arrival was tachycardic with leukocytosis (22.7) and elevated lactic acid (2.9).  CT chest showed fluid and stranding tracking outside the pleural space into chest wall along pleural catheter.  IR was consulted and removed catheter 4/2 with report of pustular drainage and tip culture growing MSSA.  Thoracic Surg was consulted who felt no surgical indication necessary given minimal pleural fluid and lack of any signs of abscess.  Initially treated with broad spectrum coverage for sepsis, narrowed to Ancef once sensitivities returned with plan to transition to cefadroxil for an additional 10 days at discharge per ID. Held drug 4/2 to 4/11 5/2/22- CT  CAP- Overall, positive response to therapy with decreased size of right lower lobe and right pleural-based masses, pulmonary metastases, hilar and mediastinal lymphadenopathy. However, a single right posterior pleural-based mass has slightly increased in size since 2/24/2022. No metastatic disease in the abdomen and pelvis. Right Pleurx catheter has been removed. Trace right pleural effusion and right basilar atelectasis.  5/2/22- Brain MRI- The previously demonstrated brain metastases are mildly diminished in size versus to 2/5/2022. The degree of edema is also diminished but not completely resolved. Probable trace amounts of intralesional bleeding demonstrated on the gradient sequence within the metastases. No definite new metastasis or progressive mass effect. No hydrocephalus or infarct.    6/15/22 to 6/17/22- Admitted at [facility]-with aphasia and word finding difficulty over last few weeks.  He presented to [facility] ED on 6/10 for evaluation of his symptoms. MRI brain showed multiple intracranial metastases, with interval enlargement of the dominant lesion within the left frontal lobe and increased surrounding vasogenic edema with 2 mm rightward shift of the septum pellucidum. Due to his worsening anxiety, he left AMA. His symptoms continued to progress to where he could not write at work so he decided to go to the ED for re-evaluation and treatment. Evaluated by NSGY, Rad Onc (radiaiton necrosis vs tumor progression).  6/16/22- MR Brain (6/16) shows multiple intracranial metastases, with interval enlargement  of the dominant lesion within the left frontal lobe and increased surrounding vasogenic edema with 2 mm rightward shift of the septum pellucidum.  6/16/22- - CT CAP shows slightly decreased size of right lower lobe and right pleural-based masses. No new pulmonary nodules or lymphadenopathy; No evidence of metastatic disease in the abdomen or pelvis.   6/28/22 to 6/30/22- Admitted at [facility]- Elective left Stealth  craniotomy with resection of brain tumor due to ongoing symptoms. No intraoperative complications. EBL 50 ml.  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  7/5/22- Ct CAP- Right lower lobe low-density nodules are not significantly changed. A small left upper lobe pulmonary nodule is also unchanged. Trace pleural fluid on the right has increased slightly. No convincing evidence for metastatic disease in the abdomen or pelvis.  7/18/22 to 7/19/22- Admitted to [facility] for seizure- Reportedly was only taking once Keppra instead of twice daily. Also resumed on dexamethasone 2 mg daily  8/1/22- Brain MRI- Redemonstrated postsurgical changes status post left frontoparietal Craniotomy. Interval increase in size of the dominant ring-enhancing lesion in the left posterior superior frontal lobe with increased moderate surrounding vasogenic edema and local mass effect resulting in narrowing of the supratentorial ventricular system. No significant midline shift/herniation at this time  8/1/22- Dex was increased to 4 mg daily by Dr. Moran  9/1/22- CT Chest- Near resolution of previously seen right pleural nodule. Stable right lower lobe pulmonary nodule  9/28/22- Bevacizumab for radiation necrosis  10/26/22- Bevacizumab   10/26/22- Ct CAP- Stable posterior medial right lower lobe 1.9 x 1.1 cm nodule series 8 image 176. Adjacent stable scarring and atelectasis. The previously noted pleural nodule posteriorly on the right is not currently clearly identified. Stable left upper lobe 0.3 cm nodule image 56  10/26/22- Brain MRI- Overall improved appearance of multiple intracranial metastases with near resolution of associated edema and diminished enhancement and size of multiple residual lesions. No definite new or progressive metastasis.  11/7/22 to 11/9/22- Admitted for PE and HTN urgency- Small pulmonary embolism in the right lower lobe pulmonary artery. started on Lovenox, Brain MRI neg for PRES.  12/29/22- ED visit-  "bilateral hip pain, pain in shoulders, knuckles, knees, and ankles- holding alectinib since 12/20/22 1/6/23- Ct CAP- Mild groundglass nodularity in the left upper lobe is new since the previous exam, and may be infectious in etiology. No other significant interval change. Pulmonary nodules are not significantly changed.  1/6/23- Brain MRI- Stable to diminished sequelae of intracranial metastasis and treatment changes. No new or progressive metastasis. No superimposed acute intracranial finding.    2/23/2022- Start Brigatinib. Dose reduced to 60 mg due to cough after two days of 90 mg daily -  3/23/23-Brigatinib  (increase to 90 mg)  4/20/23- CT CAP- Stable- Previously noted mild groundglass nodularity in the left upper lobe has resolved.Pulmonary nodules are unchanged.Trace amount pleural fluid on the right has decreased slightly.  4/20/23- Brain MRI- Possile progression- Largest metastasis within the right frontal lobe has increased in size with worsening peripheral nodular enhancement and worsening vasogenic edema contributing to new right to left midline shift.  Multiple new/enlarging metastases scattered throughout the cerebral hemispheres and cerebellum.    He works as a maintenance manger for apartment complexes    Interval Hx:  Connor is here to follow up. He called us because he is feeling unwell, very thirsty, frequent urination, feels like he is \"dragging\". His chest feels heavier and he has been wheezing. He also has more fullness of his right eye. He is taking dex as prescribed.     ECOG PS 0    REVIEW OF SYSTEMS: 14 point ROS negative other than the symptoms noted above in the HPI.    Wt Readings from Last 4 Encounters:   04/06/23 100.1 kg (220 lb 11.2 oz)   01/09/23 97.8 kg (215 lb 11.2 oz)   12/29/22 97.5 kg (215 lb)   11/22/22 99.7 kg (219 lb 12.8 oz)      Review of Systems:  A comprehensive ROS was performed and found to be negative or non-contributory with the exception of that noted in the HPI " above.    Past Medical History:  GERD  Hypertension, not on medication  Type 2 diabetes mellitus, not on medications currently, previously on Metformin    Past Surgical History:  Past Surgical History:   Procedure Laterality Date     BRONCHOSCOPY RIGID OR FLEXIBLE W/TRANSENDOSCOPIC ENDOBRONCHIAL ULTRASOUND GUIDED Bilateral 2022    Procedure: Right BRONCHOSCOPY, FIBEROPTIC, endobronchial ultrasound, pleural biopsy;  Surgeon: Dallin Agrawal MD;  Location: [facility] OR     INJECT BLOCK MEDIAL BRANCH CERVICAL/THORACIC/LUMBAR       INSERT CHEST TUBE Right 2022    Procedure: INSERTION, CATHETER, INTERCOSTAL, FOR DRAINAGE;  Surgeon: Dallin Agrawal MD;  Location: [facility] GI     INSERT CHEST TUBE Right 3/9/2022    Procedure: INSERTION, CATHETER, INTERCOSTAL, FOR DRAINAGE;  Surgeon: Sushila Antonio MD;  Location: [facility] GI     IR CHEST TUBE REMOVAL TUNNELED RIGHT  2022     OPTICAL TRACKING SYSTEM CRANIOTOMY, EXCISE TUMOR, COMBINED Left 2022    Procedure: Left stealth craniotomy for tumor resection with motor mapping;  Surgeon: Stephen Moran MD;  Location:  OR     ORTHOPEDIC SURGERY      Ganesh. Rotator cuff repair.     PLEUROSCOPY N/A 2022    Procedure: Pleuroscopy with Pleural Biopsy;  Surgeon: Dallin Agrawal MD;  Location:  OR       Social History:  Lives with wife and 4 kids in [place]. Works as a  for an apartment complex in [place]. Exposure to household chemicals and . No significant exposure to asbestos. No signal exposure to benzene or similar chemicals. No significant smoking history-states that he smoked 1 to 2 cigarettes occasionally per month for about 2 years in college, non-smoking since then. No significant alcohol use history. No other recreational substances. Good support system. Kids are 23, 19, 17 and 13.    Family History  Significant history for cancers on maternal side. Mother  of uterine cancer. 2 maternal uncles have possible metastatic  melanoma.    Outpatient Medications:  Current Outpatient Medications   Medication     albuterol (PROAIR HFA/PROVENTIL HFA/VENTOLIN HFA) 108 (90 Base) MCG/ACT inhaler     alcohol swab prep pads     blood glucose (NO BRAND SPECIFIED) test strip     blood glucose calibration (NO BRAND SPECIFIED) solution     blood glucose monitoring (NO BRAND SPECIFIED) meter device kit     brigatinib (ALUNBRIG) 30 MG TABS tablet     brigatinib (ALUNBRIG) 30 MG TABS tablet     celecoxib (CELEBREX) 100 MG capsule     citalopram (CELEXA) 20 MG tablet     Continuous Blood Gluc Sensor (FREESTYLE IRENE 2 SENSOR) MISC     dexamethasone (DECADRON) 2 MG tablet     escitalopram (LEXAPRO) 10 MG tablet     hydrochlorothiazide (HYDRODIURIL) 25 MG tablet     insulin aspart (NOVOLOG PEN) 100 UNIT/ML pen     insulin glargine (LANTUS PEN) 100 UNIT/ML pen     insulin pen needle (31G X 8 MM) 31G X 8 MM miscellaneous     levETIRAcetam (KEPPRA) 1000 MG tablet     lisinopril (ZESTRIL) 40 MG tablet     metFORMIN (GLUCOPHAGE XR) 500 MG 24 hr tablet     methadone (DOLOPHINE) 5 MG tablet     methocarbamol (ROBAXIN) 500 MG tablet     naloxone (NARCAN) 4 MG/0.1ML nasal spray     oxyCODONE (ROXICODONE) 5 MG tablet     prochlorperazine (COMPAZINE) 10 MG tablet     prochlorperazine (COMPAZINE) 10 MG tablet     propranolol (INDERAL) 20 MG tablet     rivaroxaban ANTICOAGULANT (XARELTO) 20 MG TABS tablet     thin (NO BRAND SPECIFIED) lancets     zolpidem (AMBIEN) 5 MG tablet     No current facility-administered medications for this visit.     General: No acute distress  HEENT: Sclera anicteric. Oral mucosa pink and moist.  No mucositis or thrush  Lymph: No lymphadenopathy in neck  Heart: Regular, rate, and rhythm  Lungs: Clear to ascultation bilaterally  Abdomen: Positive bowel sounds. Soft, non-distended, non-tender. No organomegaly or mass.   Extremities: no lower extremity edema  Neuro: Cranial nerves grossly intact  Rash: none    Labs & Studies: I personally  reviewed the following studies:  Most Recent 3 CBC's:  Recent Labs   Lab Test 04/06/23  1400 03/15/23  1432 02/09/23  1929   WBC 10.2 8.9 11.2*   HGB 15.2 14.2 16.7   MCV 88 89 93    224 170     Most Recent 3 BMP's:  Recent Labs   Lab Test 04/06/23  1400 03/15/23  1432 02/09/23  1929    149* 142   POTASSIUM 4.0 4.4 3.8   CHLORIDE 105 109* 103   CO2 26 29 25   BUN 12.2 18.1 18.0   CR 0.70 0.71 0.53*   ANIONGAP 14 11 14   TORY 9.9 9.7 9.5   *  141*  144* 203* 231*    Most Recent 2 LFT's:  Recent Labs   Lab Test 04/06/23  1400 03/15/23  1432   AST 19 22   ALT 19 17   ALKPHOS 87 84   BILITOTAL 0.5 0.5    Most Recent TSH and T4:  Recent Labs   Lab Test 09/12/22  1642   TSH 2.56        ASSESSMENT AND PLAN:  Stage IV NSCLC, Rt lung adenocarcinoma with metastasis to pleura, mediastinum , rt pleural effusion and brain diagnosed 1/2022 (AJCC 8th edition)  PD-L1 TPS 2-3% by [place]   NGS [facility] panel-EML4:ALK rearragement; chr2:[id number], chr2:[id number]  NGS Guardant- GNAS R201H, KRAS K5E- No ALK    He began Alectinib 600 mg BID 3/2/22 and unfortunately developed grade 3 myalgias which have improved with lowering the dose 450 mg BID. He was holding drug 4/2/22 to 4/11/22 due to MSSA infection from pleurex which is removed. Resumed at 450 mg BID. Due to ongoing myalgias (Although CK is normal), we dose reduced to 300 mg BID.     In Dec 2022, developed Gr 3 arthralgia, and we stopped drug since 12/20/22. Had unremitting arthalgias, eventully had to starte PO steroids which led to improvement and resolved. Radiographicaly, he has had a near CR to Rx in the lung, has a residual rt LL lesion.     Began brigatinib 2/22/23 (delayed due to pt hesitancy). Developed severe cough on day 2 so brigatinib was held and cough resolved with in 24-48 hours. He restarted 60 mg daily and upped it to 90 mg.Restging CT showing stable disease in the lung, however, MRI with several contrast enhancement and increase in size of  previously treated lesions. I discussed going up to 180 mg daily. He is agreeable.     He will f/u with [facility] in 2 weeks for tox check.    Plan  F/u with Chelsea in 2 weeks      # Brain mets:  # Radiation necrosis  Baseline Brain MRI with several brain mets, s/p GK to 11-12 brain mets. F/u Brain MRI in June 2022 was showing enlargement of the one of the lesions along with edema, therefore had to undergo craniotomy followed by resection, the final biopsy consistent with radiation necrosis.  Had issues with radiation necrosis and swelling requiring steroids but these were driving up blood sugars drastically  -Was on bevacizumab for radiation necrosis --15 mg/kg every 3 weeks.  S/p 2 doses of Bevacixumab, now on hold due HTN urgency and PE.  MRI in 4/2023 now showing contrast enhancement of lesions and a dominat lesion int he rt frontal regionw ith edema, several other smaller lesion, un lcear to me how many are new.  -continue with dex 4 mg daily  -F/u with Dr. Moran to consider NSGY resection of the dominant lesion in rt frontal region  -will consider more GK depending on the overlap with pervious sites of radiation,      # Grade 3 arthralgias most likley related to alectinib, unremiittign with tylenol, NSAIDs or xocyodoen,- nearly resolved with steroids  CK and aldolase has been normal   -All joints affected, no morning stiffness, normal ESRa nd CRP  -following with palliative;   -on methadone 2.5 mg in AM and 5 mg at HS; oxycodone 5-10 mg po q 4 hours prn; Celecoxib 100 mg po BID--held for stomach ache-    # PE: provoked by malignancy vs bevacizumab in 11/2022  -New right-sided pleuritic chest pain, tachycardiam, CT showing rt sided sub segmental PE wo   -- on rivaroxiabn 20 mg daily- stopped taking it    # G3 diastolic HTN - led to one dose hold of bevacizumab and now discontinued  Adm with /111. No evidence of end-organ damage. Most likley from Bevacizumab. Recent MRI brain revealed no PRES, stable  necrotic/mets areas and no new enhancement.  - HTCZ, lisinopril, and propranolol, mgmt per PCP     # Type 2 Diabetes: Self monitoring of blood glucose is not at goal of fasting  mg/dL. Now off of dexamethasone. Started metformin 500mg ER every day    --Started using Mohan 2 continous glucose monitor  --FOllows medication management-   --on  Metformin and insulin    #normocytic anemia: sudden drop to <7, requiring 1 unit blood transfusion. Lab work up unrevealing. Has recovered to baseline  -consider EGD if hgb drops again, risk for GI bleed with chronic steroid use        #right chest/rib pain---> intermittent- improved  Reproducible and intermittent, worse with movement of torso. Unsure of etiology but we agreed to monitor given mild, possibly msk, and no correlating respiratory or cardiac symptoms.        #MSSA infection, Sepsis at pleurex site- resolved  # Pleural effusion: s/p pleurex palcement  2/16/22. Then on 4/2 right PleurX catheter s/p removal for severe sepsis due to MSSA infection.            MEDICAL ONCOLOGY FOLLOW UP NOTE    PATIENT NAME: Connor Emerson  ENCOUNTER DATE: 5/17/2023    Care Team  Primary Oncologist: Bassam Persaud MD    REASON FOR CURRENT VISIT: F/u of lung cancer    HISTORY OF PRESENT ILLNESS:  Mr. Connor Emerson is a 42 year old  male who is a non-smoker with PMHx of T2DM, HTN with metastatic NSCLC comes for follow up     Oncologic Hx:    Diagnosis:   Stage IV NSCLC, Rt lung adenocarcinoma with metastasis to pleura, mediastinum , rt pleural effusion and brain diagnosed 1/2022 (AJCC 8th edition)  PD-L1 TPS 2-3% by [place]   NGS [facility] panel-EML4:ALK rearragement  NGS Guardant- GNAS R201H, KRAS K5E- No ALK    Treatment:    2/23/2022- current: Brigatinib. Dose reduced to 60 mg due to cough after two days of 90 mg daily -->back on 90 mg---> increased to 180 mg at last visit due to CNS progression     3/2/22- 12/2022 Alectinib 300 mg BID (Dose reduced to 450 mg BID from 600 mg BID due  to grade 3 myalgias 3/21/22, again reduced to 300 mg BID 9/28/22)    )  Past:  2/15/22- GK to 11 brain lesions  6/28/22- Craniotomy, resection  9/28/22-10/26- Bevacizumab for radiation necrosis (stopped due to PE)      Intent of treatment: Palliative    Oncologic course:  1/19/22 to 1/22/22-Admitted to [facility] for 2 week progressive SOB secondary to have large rt sided pleural effusion, needing thoracentesis x2 (1.7L and 2.0 L removed), cytology positive for malignancy, adenocarcinoma.   1/26/22- Rt pleural mass biopsy-Dr. Agrawal--POSITIVE FOR ADENOCARCINOMA CONSISTENT WITH LUNG PRIMARY, admixed with mesothelial hyperplasia and inflammatory infiltrate (+ TTF-1 and CK 7;  negative  p40, calretinin and WT-1. PAX8 immunostain focal +). 4th thoracentesis done simultaneously - 3L approx removed.   2/1/22- PET/CT-Right lower lobe central infiltrative FDG avid 8.2 x 9.6 cm mass representing a primary lung adenocarcinoma. Ipsilateral right perihilar, bilateral pretracheal, subcarinal and superior mediastinal michele metastases. Contralateral mildly FDG avid few lung nodules are suspicious for contralateral metastasis. At least 3 intracranial metastases in the right frontal lobe, left frontal lobe and left cerebellar hemisphere. Nonspecific mild diffuse bone marrow uptake. Further evaluation with a spine MRI could be considered to rule out early marrow infiltration. This could also be seen with red marrow conversion.  2/5/22-  Brain MRI- At least 9 intracranial metastases as detailed above. The dominant lesions involving the orbital right frontal lobe, the posterior left middle frontal gyrus, anterior right temporal lobe and in the left cerebellar hemisphere have surrounding moderate vasogenic type edema.  2/15/22- Saw Dr. Arango from Rad Onc- Rcd GK to 12 lesion in bran  2/16/22- Pleurex placement   3/2/22- Started Alectinib 600 mg BID  3/21/22- Dose reduced to 450 mg BID due to grade 3 myalgias and fatigue  4/2/22 to 4/5/22-  Admitted at [facility] for- Severe sepsis due to MSSA infection of right PleurX catheter s/p removal- He presented with onset of pain at tube site starting 4/1; at arrival was tachycardic with leukocytosis (22.7) and elevated lactic acid (2.9).  CT chest showed fluid and stranding tracking outside the pleural space into chest wall along pleural catheter.  IR was consulted and removed catheter 4/2 with report of pustular drainage and tip culture growing MSSA.  Thoracic Surg was consulted who felt no surgical indication necessary given minimal pleural fluid and lack of any signs of abscess.  Initially treated with broad spectrum coverage for sepsis, narrowed to Ancef once sensitivities returned with plan to transition to cefadroxil for an additional 10 days at discharge per ID. Held drug 4/2 to 4/11 5/2/22- CT CAP- Overall, positive response to therapy with decreased size of right lower lobe and right pleural-based masses, pulmonary metastases, hilar and mediastinal lymphadenopathy. However, a single right posterior pleural-based mass has slightly increased in size since 2/24/2022. No metastatic disease in the abdomen and pelvis. Right Pleurx catheter has been removed. Trace right pleural effusion and right basilar atelectasis.  5/2/22- Brain MRI- The previously demonstrated brain metastases are mildly diminished in size versus to 2/5/2022. The degree of edema is also diminished but not completely resolved. Probable trace amounts of intralesional bleeding demonstrated on the gradient sequence within the metastases. No definite new metastasis or progressive mass effect. No hydrocephalus or infarct.    6/15/22 to 6/17/22- Admitted at [facility]-with aphasia and word finding difficulty over last few weeks.  He presented to [facility] ED on 6/10 for evaluation of his symptoms. MRI brain showed multiple intracranial metastases, with interval enlargement of the dominant lesion within the left frontal lobe and increased surrounding  vasogenic edema with 2 mm rightward shift of the septum pellucidum. Due to his worsening anxiety, he left AMA. His symptoms continued to progress to where he could not write at work so he decided to go to the ED for re-evaluation and treatment. Evaluated by JATINDER, Rad Onc (radiaiton necrosis vs tumor progression).  6/16/22- MR Brain (6/16) shows multiple intracranial metastases, with interval enlargement  of the dominant lesion within the left frontal lobe and increased surrounding vasogenic edema with 2 mm rightward shift of the septum pellucidum.  6/16/22- - CT CAP shows slightly decreased size of right lower lobe and right pleural-based masses. No new pulmonary nodules or lymphadenopathy; No evidence of metastatic disease in the abdomen or pelvis.   6/28/22 to 6/30/22- Admitted at [facility]- Elective left Stealth craniotomy with resection of brain tumor due to ongoing symptoms. No intraoperative complications. EBL 50 ml.  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  7/5/22- Ct CAP- Right lower lobe low-density nodules are not significantly changed. A small left upper lobe pulmonary nodule is also unchanged. Trace pleural fluid on the right has increased slightly. No convincing evidence for metastatic disease in the abdomen or pelvis.  7/18/22 to 7/19/22- Admitted to [facility] for seizure- Reportedly was only taking once Keppra instead of twice daily. Also resumed on dexamethasone 2 mg daily  8/1/22- Brain MRI- Redemonstrated postsurgical changes status post left frontoparietal Craniotomy. Interval increase in size of the dominant ring-enhancing lesion in the left posterior superior frontal lobe with increased moderate surrounding vasogenic edema and local mass effect resulting in narrowing of the supratentorial ventricular system. No significant midline shift/herniation at this time  8/1/22- Dex was increased to 4 mg daily by Dr. Moran  9/1/22- CT Chest- Near resolution of previously seen right pleural nodule.  Stable right lower lobe pulmonary nodule  9/28/22- Bevacizumab for radiation necrosis  10/26/22- Bevacizumab   10/26/22- Ct CAP- Stable posterior medial right lower lobe 1.9 x 1.1 cm nodule series 8 image 176. Adjacent stable scarring and atelectasis. The previously noted pleural nodule posteriorly on the right is not currently clearly identified. Stable left upper lobe 0.3 cm nodule image 56  10/26/22- Brain MRI- Overall improved appearance of multiple intracranial metastases with near resolution of associated edema and diminished enhancement and size of multiple residual lesions. No definite new or progressive metastasis.  11/7/22 to 11/9/22- Admitted for PE and HTN urgency- Small pulmonary embolism in the right lower lobe pulmonary artery. started on Lovenox, Brain MRI neg for PRES.  12/29/22- ED visit- bilateral hip pain, pain in shoulders, knuckles, knees, and ankles- holding alectinib since 12/20/22 1/6/23- Ct CAP- Mild groundglass nodularity in the left upper lobe is new since the previous exam, and may be infectious in etiology. No other significant interval change. Pulmonary nodules are not significantly changed.  1/6/23- Brain MRI- Stable to diminished sequelae of intracranial metastasis and treatment changes. No new or progressive metastasis. No superimposed acute intracranial finding.    2/23/2022- Start Brigatinib. Dose reduced to 60 mg due to cough after two days of 90 mg daily -  3/23/23-Brigatinib  (increase to 90 mg)  4/20/23- CT CAP- Stable- Previously noted mild groundglass nodularity in the left upper lobe has resolved.Pulmonary nodules are unchanged.Trace amount pleural fluid on the right has decreased slightly.  4/20/23- Brain MRI- Possile progression- Largest metastasis within the right frontal lobe has increased in size with worsening peripheral nodular enhancement and worsening vasogenic edema contributing to new right to left midline shift.  Multiple new/enlarging metastases scattered  "throughout the cerebral hemispheres and cerebellum.    He works as a maintenance manger for apartment complexes    Interval Hx:  Connor is here to follow up. He called us because he is feeling unwell, very thirsty, frequent urination, feels like he is \"dragging\". His chest feels heavier and he has been wheezing. He also has more fullness of his right eye. He is taking dex as prescribed.     ECOG PS 0    REVIEW OF SYSTEMS: 14 point ROS negative other than the symptoms noted above in the HPI.    Wt Readings from Last 4 Encounters:   04/06/23 100.1 kg (220 lb 11.2 oz)   01/09/23 97.8 kg (215 lb 11.2 oz)   12/29/22 97.5 kg (215 lb)   11/22/22 99.7 kg (219 lb 12.8 oz)      Review of Systems:  A comprehensive ROS was performed and found to be negative or non-contributory with the exception of that noted in the HPI above.    Past Medical History:  GERD  Hypertension, not on medication  Type 2 diabetes mellitus, not on medications currently, previously on Metformin    Past Surgical History:  Past Surgical History:   Procedure Laterality Date     BRONCHOSCOPY RIGID OR FLEXIBLE W/TRANSENDOSCOPIC ENDOBRONCHIAL ULTRASOUND GUIDED Bilateral 1/26/2022    Procedure: Right BRONCHOSCOPY, FIBEROPTIC, endobronchial ultrasound, pleural biopsy;  Surgeon: Dallin Agrawal MD;  Location: [facility] OR     INJECT BLOCK MEDIAL BRANCH CERVICAL/THORACIC/LUMBAR       INSERT CHEST TUBE Right 2/16/2022    Procedure: INSERTION, CATHETER, INTERCOSTAL, FOR DRAINAGE;  Surgeon: Dallin Agrawal MD;  Location: [facility] GI     INSERT CHEST TUBE Right 3/9/2022    Procedure: INSERTION, CATHETER, INTERCOSTAL, FOR DRAINAGE;  Surgeon: Sushila Antonio MD;  Location: [facility] GI     IR CHEST TUBE REMOVAL TUNNELED RIGHT  4/2/2022     OPTICAL TRACKING SYSTEM CRANIOTOMY, EXCISE TUMOR, COMBINED Left 6/28/2022    Procedure: Left stealth craniotomy for tumor resection with motor mapping;  Surgeon: Stephen Moran MD;  Location:  OR     ORTHOPEDIC SURGERY      Ganesh. Rotator cuff " repair.     PLEUROSCOPY N/A 2022    Procedure: Pleuroscopy with Pleural Biopsy;  Surgeon: Dallin Agrawal MD;  Location:  OR       Social History:  Lives with wife and 4 kids in [place]. Works as a  for an apartment complex in [place]. Exposure to household chemicals and . No significant exposure to asbestos. No signal exposure to benzene or similar chemicals. No significant smoking history-states that he smoked 1 to 2 cigarettes occasionally per month for about 2 years in college, non-smoking since then. No significant alcohol use history. No other recreational substances. Good support system. Kids are 23, 19, 17 and 13.    Family History  Significant history for cancers on maternal side. Mother  of uterine cancer. 2 maternal uncles have possible metastatic melanoma.    Outpatient Medications:  Current Outpatient Medications   Medication     albuterol (PROAIR HFA/PROVENTIL HFA/VENTOLIN HFA) 108 (90 Base) MCG/ACT inhaler     alcohol swab prep pads     blood glucose (NO BRAND SPECIFIED) test strip     blood glucose calibration (NO BRAND SPECIFIED) solution     blood glucose monitoring (NO BRAND SPECIFIED) meter device kit     brigatinib (ALUNBRIG) 30 MG TABS tablet     brigatinib (ALUNBRIG) 30 MG TABS tablet     celecoxib (CELEBREX) 100 MG capsule     citalopram (CELEXA) 20 MG tablet     Continuous Blood Gluc Sensor (FREESTYLE IRENE 2 SENSOR) MISC     dexamethasone (DECADRON) 2 MG tablet     escitalopram (LEXAPRO) 10 MG tablet     hydrochlorothiazide (HYDRODIURIL) 25 MG tablet     insulin aspart (NOVOLOG PEN) 100 UNIT/ML pen     insulin glargine (LANTUS PEN) 100 UNIT/ML pen     insulin pen needle (31G X 8 MM) 31G X 8 MM miscellaneous     levETIRAcetam (KEPPRA) 1000 MG tablet     lisinopril (ZESTRIL) 40 MG tablet     metFORMIN (GLUCOPHAGE XR) 500 MG 24 hr tablet     methadone (DOLOPHINE) 5 MG tablet     methocarbamol (ROBAXIN) 500 MG tablet     naloxone (NARCAN) 4 MG/0.1ML  nasal spray     oxyCODONE (ROXICODONE) 5 MG tablet     prochlorperazine (COMPAZINE) 10 MG tablet     prochlorperazine (COMPAZINE) 10 MG tablet     propranolol (INDERAL) 20 MG tablet     rivaroxaban ANTICOAGULANT (XARELTO) 20 MG TABS tablet     thin (NO BRAND SPECIFIED) lancets     zolpidem (AMBIEN) 5 MG tablet     No current facility-administered medications for this visit.     General: No acute distress  HEENT: Sclera anicteric. Oral mucosa pink and moist.  No mucositis or thrush  Lymph: No lymphadenopathy in neck  Heart: Regular, rate, and rhythm  Lungs: Clear to ascultation bilaterally  Abdomen: Positive bowel sounds. Soft, non-distended, non-tender. No organomegaly or mass.   Extremities: no lower extremity edema  Neuro: Cranial nerves grossly intact  Rash: none    Labs & Studies: I personally reviewed the following studies:  Most Recent 3 CBC's:  Recent Labs   Lab Test 04/06/23  1400 03/15/23  1432 02/09/23  1929   WBC 10.2 8.9 11.2*   HGB 15.2 14.2 16.7   MCV 88 89 93    224 170     Most Recent 3 BMP's:  Recent Labs   Lab Test 04/06/23  1400 03/15/23  1432 02/09/23  1929    149* 142   POTASSIUM 4.0 4.4 3.8   CHLORIDE 105 109* 103   CO2 26 29 25   BUN 12.2 18.1 18.0   CR 0.70 0.71 0.53*   ANIONGAP 14 11 14   TORY 9.9 9.7 9.5   *  141*  144* 203* 231*    Most Recent 2 LFT's:  Recent Labs   Lab Test 04/06/23  1400 03/15/23  1432   AST 19 22   ALT 19 17   ALKPHOS 87 84   BILITOTAL 0.5 0.5    Most Recent TSH and T4:  Recent Labs   Lab Test 09/12/22  1642   TSH 2.56        ASSESSMENT AND PLAN:  Stage IV NSCLC, 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 He was holding drug 4/2/22 to 4/11/22 due to MSSA infection from pleurex which is removed. Resumed at 450 mg BID. Due to ongoing myalgias (Although CK is normal), we dose reduced to 300 mg BID.     In Dec 2022, developed Gr 3 arthralgia, and we stopped drug since 12/20/22. Had unremitting arthalgias, eventully had to starte PO steroids which led to improvement and resolved. Radiographicaly, he has had a near CR to Rx in the lung, has a residual rt LL lesion.     Began brigatinib 2/22/23 (delayed due to pt hesitancy). Developed severe cough on day 2 so brigatinib was held and cough resolved with in 24-48 hours. He restarted 60 mg daily and upped it to 90 mg.Restging CT showing stable disease in the lung, however, MRI with several contrast enhancement and increase in size of previously treated lesions. His dose was increased to 180 mg daily.     Unfortunately missed tox follow up and today called in feeling very unwell. He is found to have severe hyperglycemia today with a glucose of 627. He also has polyuria and polydipsia, along with hyponatremia. I recommended Connor transport to the ED for management of this with insulin. I do not think this is safe to manage outpatient given he will likely need increase of steroids (pending brain imaging) which would further increase his risk for hyperglycemia in the setting of poorly controlled diabetes.     I called and gave report to the ED provider. Caroline wife is transporting him there from clinic which he is comfortable with.     # Brain mets:  # Radiation necrosis  Recently met with Dr. Moran and started on steroied. Concern for progression based on clinical assessment today. Recommend NGS consult and brain MRI     #hyperglycemia:  Acute on chronic. Needs frequent monitoring in ED/inpt.       30 minutes spent on the date of the encounter doing chart review, review of test results, interpretation of tests, patient visit, documentation and  discussion with other provider(s)     Chelsea Villegas CNP on 5/17/2023 at 1:01 PM             Again, thank you for allowing me to participate in the care of your patient.        Sincerely,        Chelsea Villegas CNP

## 2023-05-17 NOTE — NURSING NOTE
"Pt not feeling well today. States he's felt like his heart is racing.  in lab; /97. Pt also states that his Right Eye has really been bothering him- having lots of pressure in that eye secondary to tumor. Pt also states that lately it just feels like he's \"dragging\". Sadaf Villegas NP paged to notify of Pt's symptoms.     Chief Complaint   Patient presents with     Blood Draw     Labs drawn by RN in Lab via Right Arm VPT.      Sushila Larry RN    "

## 2023-05-17 NOTE — PROGRESS NOTES
MEDICAL ONCOLOGY FOLLOW UP NOTE    PATIENT NAME: Connor Emerson  ENCOUNTER DATE: 5/17/2023    Care Team  Primary Oncologist: Bassam Persaud MD    REASON FOR CURRENT VISIT: F/u of lung cancer    HISTORY OF PRESENT ILLNESS:  Mr. Connor Emerson is a 42 year old  male who is a non-smoker with PMHx of T2DM, HTN with metastatic NSCLC comes for follow up     Oncologic Hx:    Diagnosis:   Stage IV NSCLC, Rt lung adenocarcinoma with metastasis to pleura, mediastinum , rt pleural effusion and brain diagnosed 1/2022 (AJCC 8th edition)  PD-L1 TPS 2-3% by [place]   NGS [facility] panel-EML4:ALK rearragement  NGS Guardant- GNAS R201H, KRAS K5E- No ALK    Treatment:    2/23/2022- current: Brigatinib. Dose reduced to 60 mg due to cough after two days of 90 mg daily -->back on 90 mg---> increased to 180 mg at last visit due to CNS progression     3/2/22- 12/2022 Alectinib 300 mg BID (Dose reduced to 450 mg BID from 600 mg BID due to grade 3 myalgias 3/21/22, again reduced to 300 mg BID 9/28/22)    )  Past:  2/15/22- GK to 11 brain lesions  6/28/22- Craniotomy, resection  9/28/22-10/26- Bevacizumab for radiation necrosis (stopped due to PE)      Intent of treatment: Palliative    Oncologic course:  1/19/22 to 1/22/22-Admitted to [facility] for 2 week progressive SOB secondary to have large rt sided pleural effusion, needing thoracentesis x2 (1.7L and 2.0 L removed), cytology positive for malignancy, adenocarcinoma.   1/26/22- Rt pleural mass biopsy-Dr. Agrawal--POSITIVE FOR ADENOCARCINOMA CONSISTENT WITH LUNG PRIMARY, admixed with mesothelial hyperplasia and inflammatory infiltrate (+ TTF-1 and CK 7;  negative  p40, calretinin and WT-1. PAX8 immunostain focal +). 4th thoracentesis done simultaneously - 3L approx removed.   2/1/22- PET/CT-Right lower lobe central infiltrative FDG avid 8.2 x 9.6 cm mass representing a primary lung adenocarcinoma. Ipsilateral right perihilar, bilateral pretracheal, subcarinal and superior mediastinal michele  metastases. Contralateral mildly FDG avid few lung nodules are suspicious for contralateral metastasis. At least 3 intracranial metastases in the right frontal lobe, left frontal lobe and left cerebellar hemisphere. Nonspecific mild diffuse bone marrow uptake. Further evaluation with a spine MRI could be considered to rule out early marrow infiltration. This could also be seen with red marrow conversion.  2/5/22-  Brain MRI- At least 9 intracranial metastases as detailed above. The dominant lesions involving the orbital right frontal lobe, the posterior left middle frontal gyrus, anterior right temporal lobe and in the left cerebellar hemisphere have surrounding moderate vasogenic type edema.  2/15/22- Saw Dr. Arango from Rad Onc- Rcd GK to 12 lesion in bran  2/16/22- Pleurex placement   3/2/22- Started Alectinib 600 mg BID  3/21/22- Dose reduced to 450 mg BID due to grade 3 myalgias and fatigue  4/2/22 to 4/5/22- Admitted at [facility] for- Severe sepsis due to MSSA infection of right PleurX catheter s/p removal- He presented with onset of pain at tube site starting 4/1; at arrival was tachycardic with leukocytosis (22.7) and elevated lactic acid (2.9).  CT chest showed fluid and stranding tracking outside the pleural space into chest wall along pleural catheter.  IR was consulted and removed catheter 4/2 with report of pustular drainage and tip culture growing MSSA.  Thoracic Surg was consulted who felt no surgical indication necessary given minimal pleural fluid and lack of any signs of abscess.  Initially treated with broad spectrum coverage for sepsis, narrowed to Ancef once sensitivities returned with plan to transition to cefadroxil for an additional 10 days at discharge per ID. Held drug 4/2 to 4/11 5/2/22- CT CAP- Overall, positive response to therapy with decreased size of right lower lobe and right pleural-based masses, pulmonary metastases, hilar and mediastinal lymphadenopathy. However, a single  right posterior pleural-based mass has slightly increased in size since 2/24/2022. No metastatic disease in the abdomen and pelvis. Right Pleurx catheter has been removed. Trace right pleural effusion and right basilar atelectasis.  5/2/22- Brain MRI- The previously demonstrated brain metastases are mildly diminished in size versus to 2/5/2022. The degree of edema is also diminished but not completely resolved. Probable trace amounts of intralesional bleeding demonstrated on the gradient sequence within the metastases. No definite new metastasis or progressive mass effect. No hydrocephalus or infarct.    6/15/22 to 6/17/22- Admitted at [facility]-with aphasia and word finding difficulty over last few weeks.  He presented to [facility] ED on 6/10 for evaluation of his symptoms. MRI brain showed multiple intracranial metastases, with interval enlargement of the dominant lesion within the left frontal lobe and increased surrounding vasogenic edema with 2 mm rightward shift of the septum pellucidum. Due to his worsening anxiety, he left AMA. His symptoms continued to progress to where he could not write at work so he decided to go to the ED for re-evaluation and treatment. Evaluated by NSGY, Rad Onc (radiaiton necrosis vs tumor progression).  6/16/22- MR Brain (6/16) shows multiple intracranial metastases, with interval enlargement  of the dominant lesion within the left frontal lobe and increased surrounding vasogenic edema with 2 mm rightward shift of the septum pellucidum.  6/16/22- - CT CAP shows slightly decreased size of right lower lobe and right pleural-based masses. No new pulmonary nodules or lymphadenopathy; No evidence of metastatic disease in the abdomen or pelvis.   6/28/22 to 6/30/22- Admitted at [facility]- Elective left Stealth craniotomy with resection of brain tumor due to ongoing symptoms. No intraoperative complications. EBL 50 ml.  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  7/5/22- Ct CAP- Right lower  lobe low-density nodules are not significantly changed. A small left upper lobe pulmonary nodule is also unchanged. Trace pleural fluid on the right has increased slightly. No convincing evidence for metastatic disease in the abdomen or pelvis.  7/18/22 to 7/19/22- Admitted to [facility] for seizure- Reportedly was only taking once Keppra instead of twice daily. Also resumed on dexamethasone 2 mg daily  8/1/22- Brain MRI- Redemonstrated postsurgical changes status post left frontoparietal Craniotomy. Interval increase in size of the dominant ring-enhancing lesion in the left posterior superior frontal lobe with increased moderate surrounding vasogenic edema and local mass effect resulting in narrowing of the supratentorial ventricular system. No significant midline shift/herniation at this time  8/1/22- Dex was increased to 4 mg daily by Dr. Moran  9/1/22- CT Chest- Near resolution of previously seen right pleural nodule. Stable right lower lobe pulmonary nodule  9/28/22- Bevacizumab for radiation necrosis  10/26/22- Bevacizumab   10/26/22- Ct CAP- Stable posterior medial right lower lobe 1.9 x 1.1 cm nodule series 8 image 176. Adjacent stable scarring and atelectasis. The previously noted pleural nodule posteriorly on the right is not currently clearly identified. Stable left upper lobe 0.3 cm nodule image 56  10/26/22- Brain MRI- Overall improved appearance of multiple intracranial metastases with near resolution of associated edema and diminished enhancement and size of multiple residual lesions. No definite new or progressive metastasis.  11/7/22 to 11/9/22- Admitted for PE and HTN urgency- Small pulmonary embolism in the right lower lobe pulmonary artery. started on Lovenox, Brain MRI neg for PRES.  12/29/22- ED visit- bilateral hip pain, pain in shoulders, knuckles, knees, and ankles- holding alectinib since 12/20/22 1/6/23- Ct CAP- Mild groundglass nodularity in the left upper lobe is new since the previous exam,  "and may be infectious in etiology. No other significant interval change. Pulmonary nodules are not significantly changed.  1/6/23- Brain MRI- Stable to diminished sequelae of intracranial metastasis and treatment changes. No new or progressive metastasis. No superimposed acute intracranial finding.    2/23/2022- Start Brigatinib. Dose reduced to 60 mg due to cough after two days of 90 mg daily -  3/23/23-Brigatinib  (increase to 90 mg)  4/20/23- CT CAP- Stable- Previously noted mild groundglass nodularity in the left upper lobe has resolved.Pulmonary nodules are unchanged.Trace amount pleural fluid on the right has decreased slightly.  4/20/23- Brain MRI- Possile progression- Largest metastasis within the right frontal lobe has increased in size with worsening peripheral nodular enhancement and worsening vasogenic edema contributing to new right to left midline shift.  Multiple new/enlarging metastases scattered throughout the cerebral hemispheres and cerebellum.    He works as a maintenance manger for apartment complexes    Interval Hx:  Connor is here to follow up. He called us because he is feeling unwell, very thirsty, frequent urination, feels like he is \"dragging\". His chest feels heavier and he has been wheezing. He also has more fullness of his right eye. He is taking dex as prescribed.     ECOG PS 0    REVIEW OF SYSTEMS: 14 point ROS negative other than the symptoms noted above in the HPI.    Wt Readings from Last 4 Encounters:   04/06/23 100.1 kg (220 lb 11.2 oz)   01/09/23 97.8 kg (215 lb 11.2 oz)   12/29/22 97.5 kg (215 lb)   11/22/22 99.7 kg (219 lb 12.8 oz)      Review of Systems:  A comprehensive ROS was performed and found to be negative or non-contributory with the exception of that noted in the HPI above.    Past Medical History:  GERD  Hypertension, not on medication  Type 2 diabetes mellitus, not on medications currently, previously on Metformin    Past Surgical History:  Past Surgical History: "   Procedure Laterality Date     BRONCHOSCOPY RIGID OR FLEXIBLE W/TRANSENDOSCOPIC ENDOBRONCHIAL ULTRASOUND GUIDED Bilateral 2022    Procedure: Right BRONCHOSCOPY, FIBEROPTIC, endobronchial ultrasound, pleural biopsy;  Surgeon: Dallin Agrawal MD;  Location: [facility]     INJECT BLOCK MEDIAL BRANCH CERVICAL/THORACIC/LUMBAR       INSERT CHEST TUBE Right 2022    Procedure: INSERTION, CATHETER, INTERCOSTAL, FOR DRAINAGE;  Surgeon: Dallin Agrawal MD;  Location: [facility] GI     INSERT CHEST TUBE Right 3/9/2022    Procedure: INSERTION, CATHETER, INTERCOSTAL, FOR DRAINAGE;  Surgeon: Sushila Antonio MD;  Location: [facility] GI     IR CHEST TUBE REMOVAL TUNNELED RIGHT  2022     OPTICAL TRACKING SYSTEM CRANIOTOMY, EXCISE TUMOR, COMBINED Left 2022    Procedure: Left stealth craniotomy for tumor resection with motor mapping;  Surgeon: Stephen Moran MD;  Location:  OR     ORTHOPEDIC SURGERY      Ganesh. Rotator cuff repair.     PLEUROSCOPY N/A 2022    Procedure: Pleuroscopy with Pleural Biopsy;  Surgeon: Dallin Agrawal MD;  Location:  OR       Social History:  Lives with wife and 4 kids in [place]. Works as a  for an apartment complex in [place]. Exposure to household chemicals and . No significant exposure to asbestos. No signal exposure to benzene or similar chemicals. No significant smoking history-states that he smoked 1 to 2 cigarettes occasionally per month for about 2 years in college, non-smoking since then. No significant alcohol use history. No other recreational substances. Good support system. Kids are 23, 19, 17 and 13.    Family History  Significant history for cancers on maternal side. Mother  of uterine cancer. 2 maternal uncles have possible metastatic melanoma.    Outpatient Medications:  Current Outpatient Medications   Medication     albuterol (PROAIR HFA/PROVENTIL HFA/VENTOLIN HFA) 108 (90 Base) MCG/ACT inhaler     alcohol swab prep pads     blood  glucose (NO BRAND SPECIFIED) test strip     blood glucose calibration (NO BRAND SPECIFIED) solution     blood glucose monitoring (NO BRAND SPECIFIED) meter device kit     brigatinib (ALUNBRIG) 30 MG TABS tablet     brigatinib (ALUNBRIG) 30 MG TABS tablet     celecoxib (CELEBREX) 100 MG capsule     citalopram (CELEXA) 20 MG tablet     Continuous Blood Gluc Sensor (FREESTYLE IRENE 2 SENSOR) MISC     dexamethasone (DECADRON) 2 MG tablet     escitalopram (LEXAPRO) 10 MG tablet     hydrochlorothiazide (HYDRODIURIL) 25 MG tablet     insulin aspart (NOVOLOG PEN) 100 UNIT/ML pen     insulin glargine (LANTUS PEN) 100 UNIT/ML pen     insulin pen needle (31G X 8 MM) 31G X 8 MM miscellaneous     levETIRAcetam (KEPPRA) 1000 MG tablet     lisinopril (ZESTRIL) 40 MG tablet     metFORMIN (GLUCOPHAGE XR) 500 MG 24 hr tablet     methadone (DOLOPHINE) 5 MG tablet     methocarbamol (ROBAXIN) 500 MG tablet     naloxone (NARCAN) 4 MG/0.1ML nasal spray     oxyCODONE (ROXICODONE) 5 MG tablet     prochlorperazine (COMPAZINE) 10 MG tablet     prochlorperazine (COMPAZINE) 10 MG tablet     propranolol (INDERAL) 20 MG tablet     rivaroxaban ANTICOAGULANT (XARELTO) 20 MG TABS tablet     thin (NO BRAND SPECIFIED) lancets     zolpidem (AMBIEN) 5 MG tablet     No current facility-administered medications for this visit.     General: No acute distress  HEENT: Sclera anicteric. Oral mucosa pink and moist.  No mucositis or thrush  Lymph: No lymphadenopathy in neck  Heart: Regular, rate, and rhythm  Lungs: Clear to ascultation bilaterally  Abdomen: Positive bowel sounds. Soft, non-distended, non-tender. No organomegaly or mass.   Extremities: no lower extremity edema  Neuro: Cranial nerves grossly intact  Rash: none    Labs & Studies: I personally reviewed the following studies:  Most Recent 3 CBC's:  Recent Labs   Lab Test 04/06/23  1400 03/15/23  1432 02/09/23  1929   WBC 10.2 8.9 11.2*   HGB 15.2 14.2 16.7   MCV 88 89 93    224 170     Most  Recent 3 BMP's:  Recent Labs   Lab Test 04/06/23  1400 03/15/23  1432 02/09/23  1929    149* 142   POTASSIUM 4.0 4.4 3.8   CHLORIDE 105 109* 103   CO2 26 29 25   BUN 12.2 18.1 18.0   CR 0.70 0.71 0.53*   ANIONGAP 14 11 14   TORY 9.9 9.7 9.5   *  141*  144* 203* 231*    Most Recent 2 LFT's:  Recent Labs   Lab Test 04/06/23  1400 03/15/23  1432   AST 19 22   ALT 19 17   ALKPHOS 87 84   BILITOTAL 0.5 0.5    Most Recent TSH and T4:  Recent Labs   Lab Test 09/12/22  1642   TSH 2.56        ASSESSMENT AND PLAN:  Stage IV NSCLC, Rt lung adenocarcinoma with metastasis to pleura, mediastinum , rt pleural effusion and brain diagnosed 1/2022 (AJCC 8th edition)  PD-L1 TPS 2-3% by [employer]   NGS [facility] panel-EML4:ALK rearragement; chr2:[id number], chr2:[id number]  NGS Guardant- GNAS R201H, KRAS K5E- No ALK    He began Alectinib 600 mg BID 3/2/22 and unfortunately developed grade 3 myalgias which have improved with lowering the dose 450 mg BID. He was holding drug 4/2/22 to 4/11/22 due to MSSA infection from pleurex which is removed. Resumed at 450 mg BID. Due to ongoing myalgias (Although CK is normal), we dose reduced to 300 mg BID.     In Dec 2022, developed Gr 3 arthralgia, and we stopped drug since 12/20/22. Had unremitting arthalgias, eventully had to starte PO steroids which led to improvement and resolved. Radiographicaly, he has had a near CR to Rx in the lung, has a residual rt LL lesion.     Began brigatinib 2/22/23 (delayed due to pt hesitancy). Developed severe cough on day 2 so brigatinib was held and cough resolved with in 24-48 hours. He restarted 60 mg daily and upped it to 90 mg.Restging CT showing stable disease in the lung, however, MRI with several contrast enhancement and increase in size of previously treated lesions. His dose was increased to 180 mg daily.     Unfortunately missed tox follow up and today called in feeling very unwell. He is found to have severe hyperglycemia today with a glucose  of 627. He also has polyuria and polydipsia, along with hyponatremia. I recommended Connor transport to the ED for management of this with insulin. I do not think this is safe to manage outpatient given he will likely need increase of steroids (pending brain imaging) which would further increase his risk for hyperglycemia in the setting of poorly controlled diabetes.     I called and gave report to the ED provider. Caroline wife is transporting him there from clinic which he is comfortable with.     # Brain mets:  # Radiation necrosis  Recently met with Dr. Moran and started on steroied. Concern for progression based on clinical assessment today. Recommend NGS consult and brain MRI     #hyperglycemia:  Acute on chronic. Concern for progression to DKA. Needs frequent monitoring in ED/inpt.     30 minutes spent on the date of the encounter doing chart review, review of test results, interpretation of tests, patient visit, documentation and discussion with other provider(s)     Chelsea Villegas, CNP on 5/17/2023 at 1:01 PM

## 2023-05-17 NOTE — NURSING NOTE
Asymptomatic COVID swab done nasopharyngeal.    RVP swab done nasopharyngeal.    Patient tolerated well and had no issues.    Jose Cai LPN

## 2023-05-17 NOTE — LETTER
May 20, 2023      Connor Emerson  [address]   [facility] [address]     Diabetes Management Team Discharge Instructions     Glucose Control Regimen:      - Lantus (glargine) 70 units at bedtime. Decrease dose by 5 units for any overnight low blood sugars  - Meal time insulin as follows:     Pre-Meal Blood Sugar Mealtime insulin dose (while on 2mg dexamethasone) Correction for high blood sugar dose while on 2mg dexamethasone (no meal)   <70 Treat low blood sugar Treat low blood sugar    18 0   101-130 20 2   131-160 22 4   161-190 24 6   191-220 26 8   221-250 28 10   251-280 30 12   281-310 32 14   311-340 34 16   341-370 36 18   371-400 38 20   401+ 40 22   If you get any low blood sugars after taking short-acting insulin, decrease all doses by 2 units going forward.  Do this every time you have a low within 4 hours of taking short-acting insulin.                     - Resume your Metformin  mg in AM and 1000 mg at dinner.  Can go back to 1500 mg at dinner if that works best for you.   - Check blood sugars using griffin CGM or glucometer at least four times daily   - Keep a journal of your readings to bring into clinic                 Blood Glucose Checks: three times daily before meals, and at bedtime.     Follow up with your primary care doctor or endocrinology within 2 weeks, especially when your steroids stop (last dose of dexamethasone 6/1/23) as your insulin needs will likely go down.     If you have urgent questions or concerns regarding your blood sugars or insulin, you may contact [phone] (the main hospital ). Ask to speak with the endocrinologist on call.     Your target A1c value is less than 7%.

## 2023-05-17 NOTE — TELEPHONE ENCOUNTER
This patient is due for MTM follow-up. I called the patient to schedule an appointment.      No answer, I was not able to leave a message.     Patient is not reachable after four attempts (he read 3 of the mychart sent). We will stop reaching out to the patient at this time. Please let us know if we can assist in this patient's care in the future. Routed to PCP as OSMAN Carl, PharmD BCPS  Medication Therapy Management Practitioner   #[phone]

## 2023-05-17 NOTE — TELEPHONE ENCOUNTER
DATE/TIME OF CALL RECEIVED FROM LAB:  05/17/23 at 11:02 AM   LAB TEST:  Glucose   LAB VALUE:  627  PROVIDER NOTIFIED?: Yes  PROVIDER NAME: Chelsea Villegas  DATE/TIME LAB VALUE REPORTED TO PROVIDER: 05/17/23 @1103  MECHANISM OF PROVIDER NOTIFICATION: Message sent to provider. Pt being seen by provider this mornign.  PROVIDER RESPONSE: 1106 provider acknowledged critical lab. Nothing further needed from triage.

## 2023-05-17 NOTE — ED PROVIDER NOTES
"    [facility] EMERGENCY DEPARTMENT ([facility])     May 17, 2023     History     Chief Complaint   Patient presents with     Hyperglycemia     Pt was at clinic and was sent here with BS in 700's     HPI  Connor Emerson is a 43 year old male with a past medical history which includes DM2, lung cancer with brain metastasis who presents to the Emergency Department for evaluation of hyperglycemia.     The patient is a type 2 diabetic who controls his diabetes with insulin. However, he has not taken insulin for weeks. States he did not run out of insulin and has just been \"lazy\". He has been hyperglycemic in the past. Today, he feels sluggish and wheezy and reports a headache. He was at [facility] today, but due to high blood sugars was referred to the ED. His wife drove him here. No cold symptoms, cough, fevers or chills. No vomiting. States that he feels thirsty and has been urinating frequently. Of note, he reports that he is diagnosed with a brain tumor and is on a standard dose of dexamethasone for this. Has not taken his dexamethasone today. He does note that he has some right eye redness which started yesterday. His vision has been worsening recently which he feels is a complication of the brain tumor. His blood glucose was 627 at 10:23 AM today. Blood glucose 514 at 1:06 PM here in the ED.    Per chart review, the patient was seen by [facility] today in an office visit. The patient has multiple intracranial metastases; per 4/20/23 brain MRI the largest metastasis within the right frontal lobe has increased in size with worsening peripheral nodular enhancement and worsening vasogenic edema contributing to new right to left midline shift. The patient is on palliative chemotherapy. At clinic today he reported feeling unwell, thirsty with frequent urination, fatigue, chest heaviness and wheezing, as well as increased fullness of the right eye. D/t glucose of 627 was referred to the " ED.      Past Medical History  Past Medical History:   Diagnosis Date     Atypical chest pain 12/02/2013     Cancer (H)      Complication of anesthesia      Diabetes (H)      GERD (gastroesophageal reflux disease) 12/02/2013     History of pulmonary embolism      HTN (hypertension) 05/14/2012     HTN, goal below 140/90 07/02/2013     Insomnia 02/21/2012     Mediastinal lymphadenopathy      Migraine headache 07/02/2013     Migraine with aura, without mention of intractable migraine without mention of status migrainosus      Pneumonia      Past Surgical History:   Procedure Laterality Date     BRONCHOSCOPY RIGID OR FLEXIBLE W/TRANSENDOSCOPIC ENDOBRONCHIAL ULTRASOUND GUIDED Bilateral 1/26/2022    Procedure: Right BRONCHOSCOPY, FIBEROPTIC, endobronchial ultrasound, pleural biopsy;  Surgeon: Dallin Agrawal MD;  Location: [facility] OR     INJECT BLOCK MEDIAL BRANCH CERVICAL/THORACIC/LUMBAR       INSERT CHEST TUBE Right 2/16/2022    Procedure: INSERTION, CATHETER, INTERCOSTAL, FOR DRAINAGE;  Surgeon: Dallin Agrawal MD;  Location: [facility] GI     INSERT CHEST TUBE Right 3/9/2022    Procedure: INSERTION, CATHETER, INTERCOSTAL, FOR DRAINAGE;  Surgeon: Sushila Antonio MD;  Location: [facility] GI     IR CHEST TUBE REMOVAL TUNNELED RIGHT  4/2/2022     OPTICAL TRACKING SYSTEM CRANIOTOMY, EXCISE TUMOR, COMBINED Left 6/28/2022    Procedure: Left stealth craniotomy for tumor resection with motor mapping;  Surgeon: Stephen Moran MD;  Location:  OR     ORTHOPEDIC SURGERY      Ganesh. Rotator cuff repair.     PLEUROSCOPY N/A 1/26/2022    Procedure: Pleuroscopy with Pleural Biopsy;  Surgeon: Dallin Agrawal MD;  Location: [facility] OR     albuterol (PROAIR HFA/PROVENTIL HFA/VENTOLIN HFA) 108 (90 Base) MCG/ACT inhaler  alcohol swab prep pads  blood glucose (NO BRAND SPECIFIED) test strip  blood glucose calibration (NO BRAND SPECIFIED) solution  blood glucose monitoring (NO BRAND SPECIFIED) meter device kit  brigatinib (ALUNBRIG) 30 MG TABS  tablet  brigatinib (ALUNBRIG) 30 MG TABS tablet  celecoxib (CELEBREX) 100 MG capsule  citalopram (CELEXA) 20 MG tablet  Continuous Blood Gluc Sensor (FREESTYLE IRENE 2 SENSOR) MISC  dexamethasone (DECADRON) 2 MG tablet  escitalopram (LEXAPRO) 10 MG tablet  hydrochlorothiazide (HYDRODIURIL) 25 MG tablet  insulin aspart (NOVOLOG PEN) 100 UNIT/ML pen  insulin glargine (LANTUS PEN) 100 UNIT/ML pen  insulin pen needle (31G X 8 MM) 31G X 8 MM miscellaneous  levETIRAcetam (KEPPRA) 1000 MG tablet  lisinopril (ZESTRIL) 40 MG tablet  metFORMIN (GLUCOPHAGE XR) 500 MG 24 hr tablet  methadone (DOLOPHINE) 5 MG tablet  methocarbamol (ROBAXIN) 500 MG tablet  naloxone (NARCAN) 4 MG/0.1ML nasal spray  oxyCODONE (ROXICODONE) 5 MG tablet  prochlorperazine (COMPAZINE) 10 MG tablet  prochlorperazine (COMPAZINE) 10 MG tablet  propranolol (INDERAL) 20 MG tablet  rivaroxaban ANTICOAGULANT (XARELTO) 20 MG TABS tablet  thin (NO BRAND SPECIFIED) lancets  zolpidem (AMBIEN) 5 MG tablet      Allergies   Allergen Reactions     Vicodin [Hydrocodone-Acetaminophen] Nausea and Vomiting and GI Disturbance     Family History  Family History   Problem Relation Age of Onset     Unknown/Adopted Mother      Uterine Cancer Mother      Unknown/Adopted Father      Unknown/Adopted Maternal Grandmother      Unknown/Adopted Maternal Grandfather      Stomach Cancer Maternal Grandfather      Unknown/Adopted Paternal Grandmother      Unknown/Adopted Paternal Grandfather      Melanoma Maternal Uncle      Social History   Social History     Tobacco Use     Smoking status: Never     Smokeless tobacco: Never   Vaping Use     Vaping status: Never Used     Passive vaping exposure: Yes   Substance Use Topics     Alcohol use: Yes     Alcohol/week: 0.0 standard drinks of alcohol     Comment: social     Drug use: No      Past medical history, past surgical history, medications, allergies, family history, and social history were reviewed with the patient. No additional  pertinent items.      A medically appropriate review of systems was performed with pertinent positives and negatives noted in the HPI, and all other systems negative.    Physical Exam   BP: (!) 173/116  Pulse: (!) 130  Temp: 98  F (36.7  C)  Resp: 24  SpO2: 100 %  Physical Exam  Vitals and nursing note reviewed.   Constitutional:       General: He is in acute distress.      Appearance: Normal appearance. He is ill-appearing. He is not toxic-appearing.   HENT:      Head: Atraumatic.      Mouth/Throat:      Mouth: Mucous membranes are dry.   Eyes:      General: Lids are normal. No scleral icterus.        Right eye: No foreign body, discharge or hordeolum.      Extraocular Movements: Extraocular movements intact.      Conjunctiva/sclera:      Right eye: Right conjunctiva is injected.      Pupils: Pupils are equal, round, and reactive to light.      Right eye: No corneal abrasion.   Cardiovascular:      Rate and Rhythm: Regular rhythm. Tachycardia present.      Heart sounds: Normal heart sounds.      Comments: Regular tachycardia at a rate of 130  Pulmonary:      Effort: Respiratory distress present.      Breath sounds: Normal breath sounds.      Comments: Patient is tachypneic with shallow breaths but can speak in complete sentences  Abdominal:      Palpations: Abdomen is soft.      Tenderness: There is no abdominal tenderness.   Musculoskeletal:         General: No deformity.      Cervical back: Neck supple.   Skin:     General: Skin is warm.   Neurological:      General: No focal deficit present.      Mental Status: He is alert and oriented to person, place, and time.      Cranial Nerves: Cranial nerves 2-12 are intact.      Sensory: Sensation is intact.      Coordination: Coordination is intact.           ED Course, Procedures, & Data   1:17 PM  The patient was seen and examined by Vito Bailey MD in Room ED08.     ED Course as of 05/17/23 1611   Wed May 17, 2023   1320 Glucose by meter(!!)   1320 GLUCOSE BY METER  POCT(!!): 514     Procedures       ED Course Selections:        EKG Interpretation:      Interpreted by Vito Bailey MD  Time reviewed: 1620  Symptoms at time of EKG: Hyperglycemia  Rhythm: normal sinus   Rate: Normal  Axis: Normal  Ectopy: none  Conduction: normal  ST Segments/ T Waves: No ST-T wave changes and No acute ischemic changes  Q Waves: inferior leads  Comparison to prior: Unchanged    Clinical Impression: Normal sinus rhythm with age-indeterminate inferior infarct unchanged from previous                           Results for orders placed or performed during the hospital encounter of 05/17/23   Head CT w/o contrast     Status: None    Narrative    CT OF THE HEAD WITHOUT CONTRAST  5/17/2023 2:04 PM     COMPARISON: Brain MRI 4/20/2023.    HISTORY:  Known metastasis, headache.    TECHNIQUE: Axial CT images of the head from the skull base to the  vertex were acquired without IV contrast.    FINDINGS: Known metastatic nodule in the inferior aspect of the right  frontal lobe with surrounding vasogenic edema again noted. Presumed  metastatic nodule in the superior aspect of the left cerebral  hemisphere again noted. Postoperative change consisting of a left  frontal craniotomy and biopsy site in the left frontal lobe again  noted. Gray-white differentiation of the brain is otherwise normal.    The ventricles and basal cisterns are within normal limits in  configuration. There is no midline shift. There are no extra-axial  fluid collections.    No intracranial hemorrhage or recent infarct.    The visualized paranasal sinuses are well-aerated. There is no  mastoiditis. There are no fractures of the visualized bones.       Impression    IMPRESSION:  1. Stable head CT demonstrating presumed metastatic nodules in the  inferior right frontal lobe and superior aspect of the left cerebellar  hemisphere as well as postoperative changes to the high left frontal  lobe.  2. No evidence for acute intracranial  pathology.      Radiation dose for this scan was reduced using automated exposure  control, adjustment of the mA and/or kV according to patient size, or  iterative reconstruction technique.    DENIZ ABDULLAHI MD         SYSTEM ID:  [id number]   XR Chest 1 View     Status: None    Narrative    CHEST ONE VIEW  5/17/2023 2:10 PM     HISTORY:  Dyspnea.    COMPARISON: 2/9/2023.      Impression    IMPRESSION: Negative chest. Lungs clear.    RINA GIRALDO MD         SYSTEM ID:  [id number]   Glucose by meter     Status: Abnormal   Result Value Ref Range    GLUCOSE BY METER POCT 514 (HH) 70 - 99 mg/dL   Troponin T, High Sensitivity     Status: Normal   Result Value Ref Range    Troponin T, High Sensitivity 19 <=22 ng/L   Osmolality     Status: Abnormal   Result Value Ref Range    Osmolality Blood 315 (H) 275 - 295 mmol/kg    Narrative    Greater than 385 mmol/kg relates to stupor in hyperglycemia   Greater than 400 mmol/kg can relate to seizures   Greater than 420 mmol/kg can be lethal    Serum Osmalar Gap:   Normal <10   Larger suggest unmeasured substances present in serum (ethanol, methanol, isopropanol, mannitol, ethylene glycol).   Ketone Beta-Hydroxybutyrate Quantitative     Status: Abnormal   Result Value Ref Range    Ketone (Beta-Hydroxybutyrate) Quantitative 1.80 (HH) <=0.30 mmol/L   CBC with platelets and differential     Status: Abnormal   Result Value Ref Range    WBC Count 17.0 (H) 4.0 - 11.0 10e3/uL    RBC Count 5.63 4.40 - 5.90 10e6/uL    Hemoglobin 17.6 13.3 - 17.7 g/dL    Hematocrit 48.9 40.0 - 53.0 %    MCV 87 78 - 100 fL    MCH 31.3 26.5 - 33.0 pg    MCHC 36.0 31.5 - 36.5 g/dL    RDW 12.1 10.0 - 15.0 %    Platelet Count 306 150 - 450 10e3/uL    % Neutrophils 67 %    % Lymphocytes 26 %    % Monocytes 5 %    % Eosinophils 1 %    % Basophils 0 %    % Immature Granulocytes 1 %    NRBCs per 100 WBC 0 <1 /100    Absolute Neutrophils 11.4 (H) 1.6 - 8.3 10e3/uL    Absolute Lymphocytes 4.4 0.8 - 5.3 10e3/uL     Absolute Monocytes 0.8 0.0 - 1.3 10e3/uL    Absolute Eosinophils 0.2 0.0 - 0.7 10e3/uL    Absolute Basophils 0.0 0.0 - 0.2 10e3/uL    Absolute Immature Granulocytes 0.1 <=0.4 10e3/uL    Absolute NRBCs 0.0 10e3/uL   iStat Gases (lactate) venous, POCT     Status: Abnormal   Result Value Ref Range    Lactic Acid POCT 3.5 (H) <=2.0 mmol/L    Bicarbonate Venous POCT 25 21 - 28 mmol/L    O2 Sat, Venous POCT 91 (L) 94 - 100 %    pCO2V Venous POCT 25 (L) 40 - 50 mm Hg    pH Venous POCT 7.61 (HH) 7.32 - 7.43    pO2 Venous POCT 47 25 - 47 mm Hg   Comprehensive metabolic panel     Status: Abnormal   Result Value Ref Range    Sodium 130 (L) 136 - 145 mmol/L    Potassium 4.3 3.4 - 5.3 mmol/L    Chloride 88 (L) 98 - 107 mmol/L    Carbon Dioxide (CO2) 20 (L) 22 - 29 mmol/L    Anion Gap 22 (H) 7 - 15 mmol/L    Urea Nitrogen 28.6 (H) 6.0 - 20.0 mg/dL    Creatinine 0.79 0.67 - 1.17 mg/dL    Calcium 10.0 8.6 - 10.0 mg/dL    Glucose 563 (HH) 70 - 99 mg/dL    Alkaline Phosphatase 95 40 - 129 U/L    AST 17 10 - 50 U/L    ALT 19 10 - 50 U/L    Protein Total 6.8 6.4 - 8.3 g/dL    Albumin 4.4 3.5 - 5.2 g/dL    Bilirubin Total 0.9 <=1.2 mg/dL    GFR Estimate >90 >60 mL/min/1.73m2   Glucose by meter     Status: Abnormal   Result Value Ref Range    GLUCOSE BY METER POCT 476 (H) 70 - 99 mg/dL   Glucose by meter     Status: Abnormal   Result Value Ref Range    GLUCOSE BY METER POCT 441 (H) 70 - 99 mg/dL   CBC with platelets differential     Status: Abnormal    Narrative    The following orders were created for panel order CBC with platelets differential.  Procedure                               Abnormality         Status                     ---------                               -----------         ------                     CBC with platelets and d...[[id number]]  Abnormal            Final result                 Please view results for these tests on the individual orders.   Results for orders placed or performed in visit on 05/17/23    Magnesium     Status: Normal   Result Value Ref Range    Magnesium 2.1 1.7 - 2.3 mg/dL   Basic metabolic panel     Status: Abnormal   Result Value Ref Range    Sodium 129 (L) 136 - 145 mmol/L    Potassium 4.8 3.4 - 5.3 mmol/L    Chloride 87 (L) 98 - 107 mmol/L    Carbon Dioxide (CO2) 25 22 - 29 mmol/L    Anion Gap 17 (H) 7 - 15 mmol/L    Urea Nitrogen 27.5 (H) 6.0 - 20.0 mg/dL    Creatinine 0.74 0.67 - 1.17 mg/dL    Calcium 9.9 8.6 - 10.0 mg/dL    Glucose 627 (HH) 70 - 99 mg/dL    GFR Estimate >90 >60 mL/min/1.73m2   CBC with platelets and differential     Status: Abnormal   Result Value Ref Range    WBC Count 16.1 (H) 4.0 - 11.0 10e3/uL    RBC Count 5.69 4.40 - 5.90 10e6/uL    Hemoglobin 17.5 13.3 - 17.7 g/dL    Hematocrit 48.2 40.0 - 53.0 %    MCV 85 78 - 100 fL    MCH 30.8 26.5 - 33.0 pg    MCHC 36.3 31.5 - 36.5 g/dL    RDW 12.2 10.0 - 15.0 %    Platelet Count 267 150 - 450 10e3/uL    % Neutrophils 68 %    % Lymphocytes 26 %    % Monocytes 5 %    % Eosinophils 1 %    % Basophils 0 %    % Immature Granulocytes 0 %    NRBCs per 100 WBC 0 <1 /100    Absolute Neutrophils 10.8 (H) 1.6 - 8.3 10e3/uL    Absolute Lymphocytes 4.2 0.8 - 5.3 10e3/uL    Absolute Monocytes 0.7 0.0 - 1.3 10e3/uL    Absolute Eosinophils 0.2 0.0 - 0.7 10e3/uL    Absolute Basophils 0.1 0.0 - 0.2 10e3/uL    Absolute Immature Granulocytes 0.1 <=0.4 10e3/uL    Absolute NRBCs 0.0 10e3/uL   Asymptomatic COVID-19 Virus (Coronavirus) by PCR Nasopharyngeal     Status: Normal    Specimen: Nasopharyngeal; Swab   Result Value Ref Range    SARS CoV2 PCR Negative Negative    Narrative    Testing was performed using the XpGyst Xpress SARS-CoV-2 Assay on the Cepheid Gene-Xpert Instrument Systems. Additional information about this Emergency Use Authorization (EUA) assay can be found via the Lab Guide. This test should be ordered for the detection of SARS-CoV-2 in individuals who meet SARS-CoV-2 clinical and/or epidemiological criteria as well as from  individuals without symptoms or other reasons to suspect COVID-19. Test performance for asymptomatic patients has only been established in anterior nasal swab specimens. This test is for in vitro diagnostic use under the FDA EUA for laboratories certified under CLIA to perform high complexity testing. This test has not been FDA cleared or approved. A negative result does not rule out the presence of PCR inhibitors in the specimen or target RNA concentration below the limit of detection for the assay. The possibility of a false negative should be considered if the patient's recent exposure or clinical presentation suggests COVID-19. This test was validated by [facility]. These Laboratories are certified under the Clinical Laboratory Improvement Amendments (CLIA) as qualified to perform high complexity testing.     Respiratory Panel PCR     Status: Normal    Specimen: Nasopharyngeal; Swab   Result Value Ref Range    Adenovirus Not Detected Not Detected    Coronavirus Not Detected Not Detected    Human Metapneumovirus Not Detected Not Detected    Human Rhin/Enterovirus Not Detected Not Detected    Influenza A Not Detected Not Detected    Influenza A, H1 Not Detected Not Detected    Influenza A 2009 H1N1 Not Detected Not Detected    Influenza A, H3 Not Detected Not Detected    Influenza B Not Detected Not Detected    Parainfluenza Virus 1 Not Detected Not Detected    Parainfluenza Virus 2 Not Detected Not Detected    Parainfluenza Virus 3 Not Detected Not Detected    Parainfluenza Virus 4 Not Detected Not Detected    Respiratory Syncytial Virus A Not Detected Not Detected    Respiratory Syncytial Virus B Not Detected Not Detected    Chlamydia Pneumoniae Not Detected Not Detected    Mycoplasma Pneumoniae Not Detected Not Detected    Narrative    The [employer] Respiratory Panel is a qualitative nucleic acid, multiplex, in vitro diagnostic test for the simultaneous detection and identification of multiple  respiratory viral and bacterial nucleic acids in nasopharyngeal swabs collected in viral transport media from individual exhibiting signs and symptoms of respiratory infection. The assay has received FDA approval for the testing of nasopharyngeal (NP) swabs only. The [facility] Laboratory at [facility] has validated the performance characteristics for bronchial alveolar lavage specimens. This test is used for clinical purposes and should not be regarded as investigational or for research. This laboratory is certified under the Clinical Laboratory Improvement Amendments of 1988 (CLIA-88) as qualified to perform high complexity clinical laboratory testing.    CBC with platelets differential     Status: Abnormal    Narrative    The following orders were created for panel order CBC with platelets differential.  Procedure                               Abnormality         Status                     ---------                               -----------         ------                     CBC with platelets and d...[[id number]]  Abnormal            Final result                 Please view results for these tests on the individual orders.     Medications   dextrose 50 % injection 25-50 mL (has no administration in time range)   0.9% sodium chloride BOLUS (1,000 mLs Intravenous $New Bag 5/17/23 1329)     Followed by   0.9% sodium chloride BOLUS ( Intravenous Restarted 5/17/23 1440)     Followed by   0.45% sodium chloride infusion (has no administration in time range)   dextrose 5% and 0.45% NaCl infusion (has no administration in time range)   potassium chloride 10 mEq in 100 mL sterile water infusion (10 mEq Intravenous $New Bag 5/17/23 1552)   potassium chloride 10 mEq in 100 mL sterile water infusion (has no administration in time range)   insulin 1 unit/1 mL in NS (NovoLIN, HumuLIN Regular) drip -ED DKA algorithm (5.5 Units/hr Intravenous Rate/Dose Verify 5/17/23 1537)   ondansetron (ZOFRAN)  injection 4 mg (has no administration in time range)   sodium chloride 0.9 % infusion (has no administration in time range)   sodium chloride 0.9 % infusion (500 mLs  $New Bag 5/17/23 5745)     Labs Ordered and Resulted from Time of ED Arrival to Time of ED Departure   GLUCOSE BY METER - Abnormal       Result Value    GLUCOSE BY METER POCT 514 (*)    OSMOLALITY - Abnormal    Osmolality Blood 315 (*)    KETONE BETA-HYDROXYBUTYRATE QUANTITATIVE, RAPID - Abnormal    Ketone (Beta-Hydroxybutyrate) Quantitative 1.80 (*)    CBC WITH PLATELETS AND DIFFERENTIAL - Abnormal    WBC Count 17.0 (*)     RBC Count 5.63      Hemoglobin 17.6      Hematocrit 48.9      MCV 87      MCH 31.3      MCHC 36.0      RDW 12.1      Platelet Count 306      % Neutrophils 67      % Lymphocytes 26      % Monocytes 5      % Eosinophils 1      % Basophils 0      % Immature Granulocytes 1      NRBCs per 100 WBC 0      Absolute Neutrophils 11.4 (*)     Absolute Lymphocytes 4.4      Absolute Monocytes 0.8      Absolute Eosinophils 0.2      Absolute Basophils 0.0      Absolute Immature Granulocytes 0.1      Absolute NRBCs 0.0     ISTAT GASES LACTATE VENOUS POCT - Abnormal    Lactic Acid POCT 3.5 (*)     Bicarbonate Venous POCT 25      O2 Sat, Venous POCT 91 (*)     pCO2V Venous POCT 25 (*)     pH Venous POCT 7.61 (*)     pO2 Venous POCT 47     COMPREHENSIVE METABOLIC PANEL - Abnormal    Sodium 130 (*)     Potassium 4.3      Chloride 88 (*)     Carbon Dioxide (CO2) 20 (*)     Anion Gap 22 (*)     Urea Nitrogen 28.6 (*)     Creatinine 0.79      Calcium 10.0      Glucose 563 (*)     Alkaline Phosphatase 95      AST 17      ALT 19      Protein Total 6.8      Albumin 4.4      Bilirubin Total 0.9      GFR Estimate >90     GLUCOSE BY METER - Abnormal    GLUCOSE BY METER POCT 476 (*)    GLUCOSE BY METER - Abnormal    GLUCOSE BY METER POCT 441 (*)    TROPONIN T, HIGH SENSITIVITY - Normal    Troponin T, High Sensitivity 19     GLUCOSE MONITOR NURSING POCT   LACTIC  ACID WHOLE BLOOD   ROUTINE [facility] WITH MICROSCOPIC REFLEX TO CULTURE   LACTIC ACID WHOLE BLOOD   BLOOD CULTURE   BLOOD CULTURE     XR Chest 1 View   Final Result   IMPRESSION: Negative chest. Lungs clear.      RINA GIRALDO MD            SYSTEM ID:  [id number]      Head CT w/o contrast   Final Result   IMPRESSION:   1. Stable head CT demonstrating presumed metastatic nodules in the   inferior right frontal lobe and superior aspect of the left cerebellar   hemisphere as well as postoperative changes to the high left frontal   lobe.   2. No evidence for acute intracranial pathology.         Radiation dose for this scan was reduced using automated exposure   control, adjustment of the mA and/or kV according to patient size, or   iterative reconstruction technique.      DENIZ ABDULLAHI MD            SYSTEM ID:  [id number]             Critical care was performed.   Critical Care Addendum  My initial assessment, based on my review of nursing observations, review of vital signs, focused history, physical exam and review of cardiac rhythm monitor, established a high suspicion that Connor Emerson has respiratory distress and a high risk electrolyte abnormality, which requires immediate intervention, and therefore he is critically ill.     After the initial assessment, the care team initiated multiple lab tests, initiated IV fluid administration, initiated medication therapy with IV insulin, initiated intensive non-invasive respiratory support and consulted with Neurosurgery to provide stabilization care. Due to the critical nature of this patient, I reassessed nursing observations, vital signs, physical exam, review of cardiac rhythm monitor, interpretation of Labs, chest x-ray and head CT, mental status, neurologic status and respiratory status multiple times prior to his disposition.     Time also spent performing documentation, reviewing test results, discussion with consultants, coordination of care and and Serial exam.      Critical care time (excluding teaching time and procedures): 45 minutes.     Assessment & Plan    A 43-year-old man with history of stage IV non-small cell lung cancer with metastasis including to the brain, type 2 diabetes, presents due to feeling weak and out of breath, and noted to have blood glucose over 700.  Differential diagnosis of otherwise unexplained hyperglycemia includes DKA/HHN, adverse reaction to treatment with steroids for brain mets with edema, noncompliance with insulin, occult infection such as UTI, pneumonia, skin and soft tissue, abdominal sepsis, atypical coronary syndrome, dietary noncompliance or medication interactions, dehydration, other endocrine dysfunction.  On exam initially the patient was in some distress, tachypneic, rapid shallow breaths but speaking in full sentences, oriented x3, tachycardic to 130 and hypertensive.  Patient's mucous membranes are dry, he has injection of the right conjunctiva without other ocular findings.  Neck is supple.  Lungs clear to auscultation.  Abdominal exam benign.  No focal neurologic deficits.  Reviewed his labs from clinic with blood sugar of 627 at that point with elevated anion gap, our initial blood glucose 514, his blood gas reveals mixed respiratory alkalosis and metabolic acidosis, serum ketones elevated.  White blood cell count elevated 17,000.  Patient was complaining also of severe headache.  I discussed the case with neurosurgery.  We obtained a stat head CT, personally interpreted by myself and also reviewed the radiologist interpretation, which shows intracranial mets with edema but no midline shift and the appearance is stable compared to his most recent imaging in April.  Patient's chest x-ray is clear.  Was treated with IV fluids, IV potassium, insulin drip.  Blood glucose is improving, vital signs improved, he is no longer tachypneic.  Neuro exam stable  Repeat lactic acid improved 1.3.  Patient appears to be clinically improving,  I feel his lactic acidosis was likely related to dehydration more so than sepsis.  Hyperglycemia almost certainly related to treatment with steroids and noncompliance with insulin in the setting of dehydration and malignant cancer.  We will plan for admission to the medicine service.  I will discuss with the hospitalist.  The patient appears to be improving on insulin drip.  I have reviewed the nursing notes. I have reviewed the findings, diagnosis, plan and need for follow up with the patient.    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   IVito MD, was physically present and have reviewed and verified the accuracy of this note documented by Jessica Bailey.    Vito Bailey MD  [facility] EMERGENCY DEPARTMENT  5/17/2023     Vito Bailey MD  05/17/23 [id number]

## 2023-05-17 NOTE — PHARMACY-ADMISSION MEDICATION HISTORY
"Admission Medication History    Admission medication history is complete. The information provided in this note is only as accurate as the sources available at the time of the update.    Information Source(s):     Patient    Dispense Report    Pertinent Information:     Patient-reported medications are consistent with fill history.    Patient reports that he last used both his Lantus and Novolog insulin pens \"a couple of weeks ago\". He reports that he has not been very adherent to these medications.    Patient reports that he last took his metformin \"a couple of weeks ago\".    Patient is currently taking citalopram and has a new prescription for escitalopram, which has not been picked up yet. Patient is unsure whether he is supposed to discontinue the citalopram upon starting the escitalopram.     Patient's reports that his wife will bring in home supply of Alunbrig tomorrow morning.     Changes made to PTA medication list:    Added: None    Deleted:     Patient no longer taking, per patient and dispense report:  o Levetiracetam 1000 mg tablet  o Methocarbamol 500 mg tablet  o Prochlorperazine 10 mg tablet  o Rivaroxaban 20 mg tablet    Changed: None    Allergies reviewed with patient and updates made in EHR.    Medication History Completed By: Maranda Alvarenga, Student Pharmacist  5/17/2023 5:56 PM    Medication Sig Last Dose Taking? Auth Provider   albuterol (PROAIR HFA/PROVENTIL HFA/VENTOLIN HFA) 108 (90 Base) MCG/ACT inhaler Inhale 2 puffs into the lungs every 6 hours as needed for shortness of breath, wheezing or cough Past Week Yes Radha Shetty Ra, APRN CNP   brigatinib (ALUNBRIG) 30 MG TABS tablet Take 3 tablets (90 mg) by mouth daily 5/16/2023 Yes Bassam Persaud MD   celecoxib (CELEBREX) 100 MG capsule Take 1 capsule (100 mg) by mouth 2 times daily 5/16/2023 Yes Ajith Brewer MD   citalopram (CELEXA) 20 MG tablet Take 1 tablet (20 mg) by mouth every evening 5/16/2023 Yes Radha Shetty Ra, " APRN CNP   dexamethasone (DECADRON) 4 MG tablet Take 4 mg by mouth daily (with breakfast) 5/16/2023 Yes Reported, Patient   hydrochlorothiazide (HYDRODIURIL) 25 MG tablet Take 1 tablet (25 mg) by mouth daily 5/16/2023 Yes Radha Shetty Ra, APRN CNP   insulin aspart (NOVOLOG PEN) 100 UNIT/ML pen Inject 2 Units Subcutaneous 3 times daily (before meals) Plus adjustment scale 1:20 for blood sugars >150mg/dL max daily 50 units Past Month Yes Rdaha Shetty Ra, APRN CNP   insulin glargine (LANTUS PEN) 100 UNIT/ML pen Inject 10-20 Units Subcutaneous daily On hold 11/22. Increase as directed, Max daily dose 100 units Past Month Yes Radha Shetty Ra, APRN CNP   lisinopril (ZESTRIL) 40 MG tablet Take 1 tablet (40 mg) by mouth daily 5/16/2023 Yes Radha Shetty Ra, APRN CNP   metFORMIN (GLUCOPHAGE XR) 500 MG 24 hr tablet Take 3 tablets daily. Past Month Yes Radha Shetty Ra, APRN CNP   methadone (DOLOPHINE) 5 MG tablet Take 0.5 tablets (2.5 mg) by mouth every morning AND 1 tablet (5 mg) At Bedtime. 5/16/2023 Yes Ajith Brewer MD   oxyCODONE (ROXICODONE) 5 MG tablet Take 1-2 tablets (5-10 mg) by mouth every 4 hours as needed for moderate to severe pain Past Week Yes Connor Ahumada APRN CNP   propranolol (INDERAL) 20 MG tablet Take 1 tablet (20 mg) by mouth 2 times daily 5/16/2023 Yes Radha Shetty Ra, APRN CNP   zolpidem (AMBIEN) 5 MG tablet Take 1 tablet (5 mg) by mouth nightly as needed for sleep Past Week Yes Ajith Brewer MD

## 2023-05-18 LAB
ALBUMIN SERPL BCG-MCNC: 3.3 G/DL (ref 3.5–5.2)
ALP SERPL-CCNC: 58 U/L (ref 40–129)
ALT SERPL W P-5'-P-CCNC: 16 U/L (ref 10–50)
ANION GAP SERPL CALCULATED.3IONS-SCNC: 9 MMOL/L (ref 7–15)
AST SERPL W P-5'-P-CCNC: 16 U/L (ref 10–50)
ATRIAL RATE - MUSE: 97 BPM
BASOPHILS # BLD AUTO: 0 10E3/UL (ref 0–0.2)
BASOPHILS NFR BLD AUTO: 0 %
BILIRUB SERPL-MCNC: 0.7 MG/DL
BUN SERPL-MCNC: 14.3 MG/DL (ref 6–20)
CALCIUM SERPL-MCNC: 8.1 MG/DL (ref 8.6–10)
CHLORIDE SERPL-SCNC: 99 MMOL/L (ref 98–107)
CREAT SERPL-MCNC: 0.55 MG/DL (ref 0.67–1.17)
DEPRECATED HCO3 PLAS-SCNC: 27 MMOL/L (ref 22–29)
DIASTOLIC BLOOD PRESSURE - MUSE: NORMAL MMHG
EOSINOPHIL # BLD AUTO: 0.4 10E3/UL (ref 0–0.7)
EOSINOPHIL NFR BLD AUTO: 4 %
ERYTHROCYTE [DISTWIDTH] IN BLOOD BY AUTOMATED COUNT: 12.2 % (ref 10–15)
GFR SERPL CREATININE-BSD FRML MDRD: >90 ML/MIN/1.73M2
GLUCOSE BLDC GLUCOMTR-MCNC: 106 MG/DL (ref 70–99)
GLUCOSE BLDC GLUCOMTR-MCNC: 110 MG/DL (ref 70–99)
GLUCOSE BLDC GLUCOMTR-MCNC: 128 MG/DL (ref 70–99)
GLUCOSE BLDC GLUCOMTR-MCNC: 137 MG/DL (ref 70–99)
GLUCOSE BLDC GLUCOMTR-MCNC: 138 MG/DL (ref 70–99)
GLUCOSE BLDC GLUCOMTR-MCNC: 156 MG/DL (ref 70–99)
GLUCOSE BLDC GLUCOMTR-MCNC: 164 MG/DL (ref 70–99)
GLUCOSE BLDC GLUCOMTR-MCNC: 169 MG/DL (ref 70–99)
GLUCOSE BLDC GLUCOMTR-MCNC: 170 MG/DL (ref 70–99)
GLUCOSE BLDC GLUCOMTR-MCNC: 171 MG/DL (ref 70–99)
GLUCOSE BLDC GLUCOMTR-MCNC: 192 MG/DL (ref 70–99)
GLUCOSE BLDC GLUCOMTR-MCNC: 195 MG/DL (ref 70–99)
GLUCOSE BLDC GLUCOMTR-MCNC: 202 MG/DL (ref 70–99)
GLUCOSE BLDC GLUCOMTR-MCNC: 203 MG/DL (ref 70–99)
GLUCOSE BLDC GLUCOMTR-MCNC: 204 MG/DL (ref 70–99)
GLUCOSE BLDC GLUCOMTR-MCNC: 212 MG/DL (ref 70–99)
GLUCOSE BLDC GLUCOMTR-MCNC: 216 MG/DL (ref 70–99)
GLUCOSE BLDC GLUCOMTR-MCNC: 218 MG/DL (ref 70–99)
GLUCOSE BLDC GLUCOMTR-MCNC: 230 MG/DL (ref 70–99)
GLUCOSE BLDC GLUCOMTR-MCNC: 238 MG/DL (ref 70–99)
GLUCOSE BLDC GLUCOMTR-MCNC: 239 MG/DL (ref 70–99)
GLUCOSE BLDC GLUCOMTR-MCNC: 252 MG/DL (ref 70–99)
GLUCOSE BLDC GLUCOMTR-MCNC: 266 MG/DL (ref 70–99)
GLUCOSE BLDC GLUCOMTR-MCNC: 282 MG/DL (ref 70–99)
GLUCOSE BLDC GLUCOMTR-MCNC: 309 MG/DL (ref 70–99)
GLUCOSE SERPL-MCNC: 154 MG/DL (ref 70–99)
HCT VFR BLD AUTO: 40.2 % (ref 40–53)
HGB BLD-MCNC: 13.7 G/DL (ref 13.3–17.7)
IMM GRANULOCYTES # BLD: 0.1 10E3/UL
IMM GRANULOCYTES NFR BLD: 1 %
INTERPRETATION ECG - MUSE: NORMAL
LYMPHOCYTES # BLD AUTO: 3.9 10E3/UL (ref 0.8–5.3)
LYMPHOCYTES NFR BLD AUTO: 36 %
MAGNESIUM SERPL-MCNC: 1.9 MG/DL (ref 1.7–2.3)
MCH RBC QN AUTO: 30.6 PG (ref 26.5–33)
MCHC RBC AUTO-ENTMCNC: 34.1 G/DL (ref 31.5–36.5)
MCV RBC AUTO: 90 FL (ref 78–100)
MONOCYTES # BLD AUTO: 0.5 10E3/UL (ref 0–1.3)
MONOCYTES NFR BLD AUTO: 5 %
NEUTROPHILS # BLD AUTO: 5.9 10E3/UL (ref 1.6–8.3)
NEUTROPHILS NFR BLD AUTO: 54 %
NRBC # BLD AUTO: 0 10E3/UL
NRBC BLD AUTO-RTO: 0 /100
P AXIS - MUSE: 21 DEGREES
PHOSPHATE SERPL-MCNC: 2.5 MG/DL (ref 2.5–4.5)
PLATELET # BLD AUTO: 192 10E3/UL (ref 150–450)
POTASSIUM SERPL-SCNC: 3.2 MMOL/L (ref 3.4–5.3)
POTASSIUM SERPL-SCNC: 4 MMOL/L (ref 3.4–5.3)
PR INTERVAL - MUSE: 138 MS
PROT SERPL-MCNC: 4.9 G/DL (ref 6.4–8.3)
QRS DURATION - MUSE: 74 MS
QT - MUSE: 380 MS
QTC - MUSE: 482 MS
R AXIS - MUSE: 2 DEGREES
RBC # BLD AUTO: 4.47 10E6/UL (ref 4.4–5.9)
SODIUM SERPL-SCNC: 135 MMOL/L (ref 136–145)
SYSTOLIC BLOOD PRESSURE - MUSE: NORMAL MMHG
T AXIS - MUSE: 30 DEGREES
VENTRICULAR RATE- MUSE: 97 BPM
WBC # BLD AUTO: 10.8 10E3/UL (ref 4–11)

## 2023-05-18 PROCEDURE — 120N000002 HC R&B MED SURG/OB UMMC

## 2023-05-18 PROCEDURE — 250N000009 HC RX 250: Performed by: FAMILY MEDICINE

## 2023-05-18 PROCEDURE — 80053 COMPREHEN METABOLIC PANEL: CPT | Performed by: INTERNAL MEDICINE

## 2023-05-18 PROCEDURE — 99418 PROLNG IP/OBS E/M EA 15 MIN: CPT | Performed by: NURSE PRACTITIONER

## 2023-05-18 PROCEDURE — 250N000012 HC RX MED GY IP 250 OP 636 PS 637: Performed by: INTERNAL MEDICINE

## 2023-05-18 PROCEDURE — 250N000013 HC RX MED GY IP 250 OP 250 PS 637: Performed by: INTERNAL MEDICINE

## 2023-05-18 PROCEDURE — 36415 COLL VENOUS BLD VENIPUNCTURE: CPT | Performed by: STUDENT IN AN ORGANIZED HEALTH CARE EDUCATION/TRAINING PROGRAM

## 2023-05-18 PROCEDURE — 250N000011 HC RX IP 250 OP 636: Performed by: FAMILY MEDICINE

## 2023-05-18 PROCEDURE — 99233 SBSQ HOSP IP/OBS HIGH 50: CPT | Performed by: STUDENT IN AN ORGANIZED HEALTH CARE EDUCATION/TRAINING PROGRAM

## 2023-05-18 PROCEDURE — 85004 AUTOMATED DIFF WBC COUNT: CPT | Performed by: INTERNAL MEDICINE

## 2023-05-18 PROCEDURE — 84100 ASSAY OF PHOSPHORUS: CPT | Performed by: INTERNAL MEDICINE

## 2023-05-18 PROCEDURE — 250N000011 HC RX IP 250 OP 636: Performed by: INTERNAL MEDICINE

## 2023-05-18 PROCEDURE — 84132 ASSAY OF SERUM POTASSIUM: CPT | Performed by: STUDENT IN AN ORGANIZED HEALTH CARE EDUCATION/TRAINING PROGRAM

## 2023-05-18 PROCEDURE — 250N000009 HC RX 250: Performed by: NURSE PRACTITIONER

## 2023-05-18 PROCEDURE — 83735 ASSAY OF MAGNESIUM: CPT | Performed by: STUDENT IN AN ORGANIZED HEALTH CARE EDUCATION/TRAINING PROGRAM

## 2023-05-18 PROCEDURE — 258N000002 HC RX IP 258 OP 250

## 2023-05-18 PROCEDURE — 99223 1ST HOSP IP/OBS HIGH 75: CPT | Performed by: NURSE PRACTITIONER

## 2023-05-18 PROCEDURE — 258N000002 HC RX IP 258 OP 250: Performed by: FAMILY MEDICINE

## 2023-05-18 PROCEDURE — 99222 1ST HOSP IP/OBS MODERATE 55: CPT | Mod: GC | Performed by: INTERNAL MEDICINE

## 2023-05-18 PROCEDURE — 250N000013 HC RX MED GY IP 250 OP 250 PS 637: Performed by: STUDENT IN AN ORGANIZED HEALTH CARE EDUCATION/TRAINING PROGRAM

## 2023-05-18 PROCEDURE — 250N000012 HC RX MED GY IP 250 OP 636 PS 637: Performed by: NURSE PRACTITIONER

## 2023-05-18 PROCEDURE — 36415 COLL VENOUS BLD VENIPUNCTURE: CPT | Performed by: INTERNAL MEDICINE

## 2023-05-18 RX ORDER — POTASSIUM CHLORIDE 1500 MG/1
40 TABLET, EXTENDED RELEASE ORAL ONCE
Status: COMPLETED | OUTPATIENT
Start: 2023-05-18 | End: 2023-05-18

## 2023-05-18 RX ORDER — DEXAMETHASONE 2 MG/1
2 TABLET ORAL
Status: DISCONTINUED | OUTPATIENT
Start: 2023-05-19 | End: 2023-05-20 | Stop reason: HOSPADM

## 2023-05-18 RX ORDER — SODIUM CHLORIDE 450 MG/100ML
INJECTION, SOLUTION INTRAVENOUS
Status: COMPLETED
Start: 2023-05-18 | End: 2023-05-18

## 2023-05-18 RX ORDER — MULTIVITAMIN,THERAPEUTIC
1 TABLET ORAL DAILY
Status: DISCONTINUED | OUTPATIENT
Start: 2023-05-18 | End: 2023-05-20 | Stop reason: HOSPADM

## 2023-05-18 RX ADMIN — HUMAN INSULIN 5.5 UNITS/HR: 100 INJECTION, SOLUTION SUBCUTANEOUS at 03:10

## 2023-05-18 RX ADMIN — DEXAMETHASONE 4 MG: 4 TABLET ORAL at 10:39

## 2023-05-18 RX ADMIN — HUMAN INSULIN: 100 INJECTION, SOLUTION SUBCUTANEOUS at 09:38

## 2023-05-18 RX ADMIN — SODIUM CHLORIDE 1000 ML: 4.5 INJECTION, SOLUTION INTRAVENOUS at 20:56

## 2023-05-18 RX ADMIN — DEXTROSE AND SODIUM CHLORIDE 1000 ML: 5; 450 INJECTION, SOLUTION INTRAVENOUS at 07:37

## 2023-05-18 RX ADMIN — Medication 5 MG: at 20:34

## 2023-05-18 RX ADMIN — CELECOXIB 100 MG: 100 CAPSULE ORAL at 20:34

## 2023-05-18 RX ADMIN — LISINOPRIL 40 MG: 40 TABLET ORAL at 08:32

## 2023-05-18 RX ADMIN — INSULIN ASPART 5 UNITS: 100 INJECTION, SOLUTION INTRAVENOUS; SUBCUTANEOUS at 13:20

## 2023-05-18 RX ADMIN — HUMAN INSULIN: 100 INJECTION, SOLUTION SUBCUTANEOUS at 13:50

## 2023-05-18 RX ADMIN — SODIUM CHLORIDE 1000 ML: 4.5 INJECTION, SOLUTION INTRAVENOUS at 17:37

## 2023-05-18 RX ADMIN — CITALOPRAM HYDROBROMIDE 20 MG: 20 TABLET ORAL at 20:35

## 2023-05-18 RX ADMIN — POTASSIUM CHLORIDE 40 MEQ: 1500 TABLET, EXTENDED RELEASE ORAL at 08:31

## 2023-05-18 RX ADMIN — THIAMINE HCL TAB 100 MG 100 MG: 100 TAB at 17:38

## 2023-05-18 RX ADMIN — INSULIN ASPART 8 UNITS: 100 INJECTION, SOLUTION INTRAVENOUS; SUBCUTANEOUS at 17:48

## 2023-05-18 RX ADMIN — POTASSIUM & SODIUM PHOSPHATES POWDER PACK 280-160-250 MG 1 PACKET: 280-160-250 PACK at 16:54

## 2023-05-18 RX ADMIN — Medication 2.5 MG: at 08:31

## 2023-05-18 RX ADMIN — HUMAN INSULIN 16 UNITS/HR: 100 INJECTION, SOLUTION SUBCUTANEOUS at 19:34

## 2023-05-18 RX ADMIN — INSULIN GLARGINE 35 UNITS: 100 INJECTION, SOLUTION SUBCUTANEOUS at 13:55

## 2023-05-18 RX ADMIN — CELECOXIB 100 MG: 100 CAPSULE ORAL at 08:32

## 2023-05-18 RX ADMIN — HYDROCHLOROTHIAZIDE 25 MG: 25 TABLET ORAL at 08:32

## 2023-05-18 RX ADMIN — PROPRANOLOL HYDROCHLORIDE 20 MG: 10 TABLET ORAL at 08:31

## 2023-05-18 RX ADMIN — THERA TABS 1 TABLET: TAB at 17:38

## 2023-05-18 RX ADMIN — PROPRANOLOL HYDROCHLORIDE 20 MG: 10 TABLET ORAL at 20:34

## 2023-05-18 RX ADMIN — POTASSIUM & SODIUM PHOSPHATES POWDER PACK 280-160-250 MG 1 PACKET: 280-160-250 PACK at 20:34

## 2023-05-18 RX ADMIN — SODIUM CHLORIDE 1000 ML: 4.5 INJECTION, SOLUTION INTRAVENOUS at 09:48

## 2023-05-18 RX ADMIN — ENOXAPARIN SODIUM 40 MG: 40 INJECTION SUBCUTANEOUS at 18:53

## 2023-05-18 RX ADMIN — HUMAN INSULIN 10 UNITS/HR: 100 INJECTION, SOLUTION SUBCUTANEOUS at 16:36

## 2023-05-18 RX ADMIN — HUMAN INSULIN 12 UNITS/HR: 100 INJECTION, SOLUTION SUBCUTANEOUS at 22:02

## 2023-05-18 RX ADMIN — DEXTROSE AND SODIUM CHLORIDE 1000 ML: 5; 450 INJECTION, SOLUTION INTRAVENOUS at 02:23

## 2023-05-18 ASSESSMENT — ACTIVITIES OF DAILY LIVING (ADL)
ADLS_ACUITY_SCORE: 18

## 2023-05-18 NOTE — PLAN OF CARE
Goal Outcome Evaluation:  [id number]    Patient is A&O x4. Call light within reach. Able to make needs known effectively. Independent in the room. Voids spontaneously without difficulty.    RA. Moderate consistent Carb(60g) diet. PIV on R hand, SL, intact and patent. PIV on L AC, SL, intact and patent. PIV on L hand, infusing with continuous D5% and 0.45% NaCl at 250ml/hr and insulin drip 1unit/1ml,  Algorithm 3. Denies pain, chest pain, SOB, n/v and n/t. BP elevated, notified provider.    RN managed of K(3.7) and Mg(2.0). AM lab draws placed. Latest Lab values reported to provider.

## 2023-05-18 NOTE — PROGRESS NOTES
CLINICAL NUTRITION SERVICES - ASSESSMENT NOTE     Nutrition Prescription    RECOMMENDATIONS FOR MDs/PROVIDERS TO ORDER:  - none at present.  - Please add Phosphorus replacement protocol as likely to drop with decrease in blood sugars (refeeding syndrome).     Malnutrition Status:    Patient does not meet two of the established criteria necessary for diagnosing malnutrition but is at risk for malnutrition    Recommendations already ordered by Registered Dietitian (RD):  - start multivitamin plus 100 mg thiamine x 5 days to support carb metabolism.   - add Phosphorus to last lab check (none ordered this admission).     Future/Additional Recommendations:  - follow po intake, blood sugars.  RD available for further diet education but no needs apparent at present.      REASON FOR ASSESSMENT  Connor Emerson is a/an 43 year old male assessed by the dietitian for Provider Order -Unintended weight loss.    Chart reviewed.  Per chart, PMH includes DM2 (dx'd 2015), metastatic NSCLC diagnosed in 1/2022 with mets to brain (GK to 11 brain lesions 2/15/22, craniotomy w/ resection 6/28/22), GERD, HTN, h/o PE.     NUTRITION HISTORY  Pt told oncologist that he missed his insulin on and off for 2-3 weeks prior to admission and then didn't feel well for a week PTA.  Pt reported to Endo CNP that he didn't take any of his lantus or novolog or metformin for at least the past week.  His BG would be in the 120-130s when he took a smaller dose of prescribed lantus and novolog and took metformin as ordered. He states this is why he stopped taking them.  He states that he is aware of foods with and without carbs and knows how to read food labels.  Wife also knows how to carb count as their daughter has diabetes as well.  He doesn't think that dosing his insulin based on carb intake will work for him since his schedule is too sporadic. He states he lost all cravings for chips or sweets.      Also per Endo CNP note:     Diet: no restrictions - 2  "meals per day   Bf: rarely eats breakfast   Lunch: varies  Dinner: varies  Snacks: not really a snacker   Beverages: water, diet coke, zero sugar orange pop, 1 cup of coffee in AM - creamer only    Issues with food insecurity:  no  Recent weight changes:  Lost about 20 lbs in past 1 month   Exercise: feet all day - maintenance     Also noted procrastinating and forgetting as barriers to compliance with prescribed regimen. He feels if he had a simplified plan he would definitely follow it as he never wants to feel how he did over the past week again. Interestingly, he notes that he falls asleep after work by 5:30 on the couch and then wakes up around 10 pm and is unable to fall asleep all night.      CURRENT NUTRITION ORDERS  Diet: Moderate Consistent Carbohydrate (60 g CHO/meal)  Intake/Tolerance: Pt states he tolerated lunch.  Had a Caesar salad and some other carb free items.  His only carb was the small amount of croutons and small amount in salad dressing.     LABS  Labs reviewed  5/17 Lactic acid 3.5.  Ketones 1.8  Blood sugars variable as no meal insulin ordered with need for variable drip rate.    GI  Per charting, BM 5/17 PTA.  GI sx of hyperglycemia have resolved now.     MEDICATIONS  Medications reviewed  Insulin drip (expect to taper off 5/18)  Lantus--35 U started   Novolog w/ meals: 1unit per 8 g CHO AC meals/snacks    ANTHROPOMETRICS  Height: 175.3 cm (5' 9\")  Most Recent Weight: 93 kg (205 lb)    IBW: 72.7 kg (128 % IBW)  BMI: Obesity Grade I BMI 30-34.9  Weight History: Wt loss of 6.7% in ~6 weeks. Doesn't meet criteria for significance. Weights documented within the past month appear to be copied from 4/6 clinic visit so unable to show time frame for wt loss in < 1 month or 1 week.   Wt Readings from Last 20 Encounters:   05/17/23 93 kg (205 lb)   05/17/23 93.4 kg (205 lb 14.6 oz)   04/06/23 100.1 kg (220 lb 11.2 oz)   01/09/23 97.8 kg (215 lb 11.2 oz)   12/29/22 97.5 kg (215 lb)   11/22/22 99.7 kg " (219 lb 12.8 oz)   11/16/22 94.8 kg (209 lb)   11/09/22 94.9 kg (209 lb 4.8 oz)   11/07/22 99.1 kg (218 lb 8 oz)   10/26/22 98.3 kg (216 lb 11.2 oz)   10/18/22 99.1 kg (218 lb 6.4 oz)   10/03/22 99.2 kg (218 lb 9.6 oz)   09/28/22 98.1 kg (216 lb 4.8 oz)   09/13/22 98 kg (216 lb)   09/12/22 98.9 kg (218 lb)   08/29/22 98.6 kg (217 lb 6.4 oz)   07/18/22 94.4 kg (208 lb 1.6 oz)   07/14/22 91.6 kg (202 lb)   06/28/22 91.6 kg (202 lb)   06/27/22 92.4 kg (203 lb 9.6 oz)       Dosing Weight: 77.8 kg (based on 5/17 wt of 93 kg and IBW of 72.7 kg)    ASSESSED NUTRITION NEEDS  Estimated Energy Needs: 1556 - 1945+  kcals/day (20 - 25+ kcals/kg)  Justification: Obese plus potentially increased needs  Estimated Protein Needs: 94+ grams protein/day (1.2+)  Justification: Obesity guidelines  Estimated Fluid Needs: 2334 - 2723 mL/day (30 - 35 mL/kg)   Justification: Increased needs (w/ dehydration, hyperglycemia)and Per provider pending fluid status    PHYSICAL FINDINGS  See malnutrition section below.    MALNUTRITION  % Intake: < 75% for > 7 days (moderate)  % Weight Loss: Weight loss does not meet criteria--and likely majority related to hyperglycemia.  Subcutaneous Fat Loss: None observed  Muscle Loss: None observed  Fluid Accumulation/Edema: None noted  Malnutrition Diagnosis: Patient does not meet two of the established criteria necessary for diagnosing malnutrition but is at risk for malnutrition    NUTRITION DIAGNOSIS  Impaired nutrient utilization related to lack of insulin/DM Rx as evidenced by hyperosmolar hyperglycemic state, reported thirst, increased urination, and associated weight loss.     INTERVENTIONS  Implementation  Nutrition Education: Encouraged pt focus on his motivation for following through on compliance with diabetes Rx and to avoid all or nothing thinking if he falls short of his goals at times.    Multivitamin/mineral supplement therapy     Goals  Patient to consume % of nutritionally adequate meal  trays TID, or the equivalent with supplements/snacks.     Monitoring/Evaluation  Progress toward goals will be monitored and evaluated per protocol.    Charline Escobedo) Robert CRUZ, LD   6B and 8A Med/surg (M-F) Pager: [phone]  Weekend/holiday pager: [phone]

## 2023-05-18 NOTE — PROGRESS NOTES
6MS ADMISSION    D: Patient admitted from ED via [facility] for Brain Metastasis and hyperglycemia.     I: Upon arrival to the unit patient was oriented to room, unit, and call light. Patient s height, weight, and vital signs were obtained. Allergies reviewed and allergy band applied. Provider notified of patient s arrival on the unit. Adult AVS completed. Head to toe assessment completed. Education assessment completed. Care plan initiated.    A: Vital signs taken, BP elevated. Notified provider. No complaints of pain. Two RN skin assessment completed, Yes. Second RN was Max IRVING No skin issues. Bed Algorithm can be found in PCS flow sheets (Support Surface Algorithm) and on IP [facility] NURSE RESOURCE TAB, was this used during this assessment? Yes. Was a pulsate mattress ordered, No.    P: Continue to monitor patient s blood sugar and BP and intervene as needed. Continue with plan of care. Notify provider with any concerns or changes in patient status.

## 2023-05-18 NOTE — UTILIZATION REVIEW
Admission Status; Secondary Review Determination       Under the authority of the Utilization Management Committee, the utilization review process indicated a secondary review on the above patient. The review outcome is based on review of the medical records, discussions with staff, and applying clinical experience noted on the date of the review.     (x) Inpatient Status Appropriate - This patient's medical care is consistent with medical management for inpatient care and reasonable inpatient medical practice.     RATIONALE FOR DETERMINATION   43-year-old  male with a history of metastatic non-small cell lung carcinoma with brain metastases was admitted on 5/17/2023, presenting with a hyperosmolar hyperglycemic state. The patient was started on an insulin drip in the emergency department and an endocrinology consultation has been requested to manage the transition to a basal bolus insulin regimen. He reports recent unintended weight loss of 15 pounds, tinnitus, periorbital edema, and anorexia. The hospitalist service will manage his hyperosmolar hyperglycemic state during his hospital stay. His most recent hemoglobin A1c level was 8.3%, indicating poorly controlled diabetes, and a high-dose correction scale NovoLog will be implemented due to concurrent Decadron therapy. An oncology consultation has been arranged to address his metastatic lung and brain cancer, while a nutrition consultation has been ordered to address his unintended weight loss and anorexia.   Patient requires inpatient admission versus short stay observation or outpatient treatment for the following reasons:        Severity of Illness: The patient has metastatic non-small cell lung cancer with brain metastases, a severe and life-threatening condition. Moreover, he presents with a hyperosmolar hyperglycemic state, a serious metabolic complication of diabetes that requires aggressive treatment and careful monitoring. His condition is  further complicated by symptoms of tinnitus, periorbital edema, anorexia, and significant weight loss (Reference: Laurent AE, Maria Del Carmen GASTELUM, Jaime JM, Gaxiola JN. Hyperglycemic crises in adult patients with diabetes. Diabetes Care. 2009;32(7):1252-3441.).        Expected Length of Stay: Given his current metabolic complication, it is likely that the patient will need more than two nights of hospital care for stabilization and proper transition to a basal bolus insulin regimen. Furthermore, the consultation with endocrinology, oncology, and nutrition teams also suggests a prolonged stay (Reference: Hollis SANCHEZ, Maria Del Carmen GASTELUM. Hyperosmolar hyperglycemic state: a historic review of the clinical presentation, diagnosis, and treatment. Diabetes Care. 2014;37(11):3633-3251.).        Intensity of Services: The patient's condition requires intensive management, including the administration of an insulin drip, close monitoring of blood glucose levels, consultation with various specialists, and potential adjustments in his antihypertensive and cancer treatment regimen (Reference: American Diabetes Association. Standards of Medical Care in Diabetes--2021. Diabetes Care. 2021;44(Supplement 1):S1-S232.).    The expected length of stay at the time of admission was more than 2 nights because of the severity of illness, intensity of service provided, and risk for adverse outcome. Inpatient admission is appropriate.         This document was produced using voice recognition software       The information on this document is developed by the utilization review team in order for the business office to ensure compliance. This only denotes the appropriateness of proper admission status and does not reflect the quality of care rendered.   The definitions of Inpatient Status and Observation Status used in making the determination above are those provided in the CMS Coverage Manual, Chapter 1 and Chapter 6, section 70.4.   Sincerely,   JOSE RAMON  VIANNEY LOPEZ MD   System Medical Director   Utilization Management   [facility].

## 2023-05-18 NOTE — PROGRESS NOTES
[facility]    Medicine Progress Note - Hospitalist Service, GOLD TEAM 21    Date of Admission:  5/17/2023    Assessment & Plan   Connor Emerson is a 43-year-old  male admitted on 5/17/2023. He has history of metastatic non-small cell lung carcinoma with brain metastases on a recent course of Decadron and presents with polyuria, polydipsia, and generally feeling well found to have BG in 600's and diagnosed with hyperosmolar hyperglycemic state. He was started on an insulin drip and admitted to hospital medicine.     #Insulin-requiring type 2 diabetes mellitus  #Hyperosmolar hyperglycemic state  Elevated BG likely due to recent steroid course.  - Endocrinology consulted, appreciate recommendations  - Continue insulin drip for now. Likely transition to subQ tomorrow.   - Continue IV fluids per protocol  - Close monitoring of electrolytes   - Consistent carbohydrate diet    #Metastatic non-small cell lung carcinoma  #Brain metastases  #Tinnitus  #Periorbital edema  #Anorexia  Patient follows with Dr. Persaud from Oncology and Dr. Moran from Neurosurgery. Was seen by Dr. Moran on 4/28 and was started on Decadron for symptoms of headache and eye pressure. He was supposed to be taking 4 mg daily x 14 days, then 2 mg daily x 14 days to end 5/17; however in talking to the patient it seems he may have been taking 4 mg daily and never switched to 2 mg? He has also not been checking his BG during this time.  - Oncology consulted, appreciate recommendations  - Per Oncology, plan for 2 mg dexamethasone daily x 14 days and then stop  -- Oncology also recommends increasing brigatinib to 180 mg daily per Dr. Persaud's note on 4/24/23  - Nutrition consultation  - Continue PTA pain regimen     #Hypertension  - Continue PTA antihypertensive regimen  - Continue to closely monitor blood pressure        Diet: Combination Diet Regular Diet Adult; Moderate Consistent Carb (60 g CHO per  "Meal) Diet    DVT Prophylaxis: Enoxaparin (Lovenox) SQ  Heart Catheter: Not present  Lines: None     Cardiac Monitoring: None  Code Status: Full Code      Clinically Significant Risk Factors        # Hypokalemia: Lowest K = 3.2 mmol/L in last 2 days, will replace as needed  # Hyponatremia: Lowest Na = 129 mmol/L in last 2 days, will monitor as appropriate  # Hypocalcemia: Lowest Ca = 8.1 mg/dL in last 2 days, will monitor and replace as appropriate  # Hypercalcemia: corrected calcium is >10.1, will monitor as appropriate   # Anion Gap Metabolic Acidosis: Highest Anion Gap = 22 mmol/L in last 2 days, will monitor and treat as appropriate  # Hypoalbuminemia: Lowest albumin = 3.3 g/dL at 5/18/2023  6:09 AM, will monitor as appropriate     # Hypertension: Noted on problem list       # DMII: A1C = 8.3 % (Ref range: 0.0 - 5.6 %) within past 6 months, PRESENT ON ADMISSION  # Obesity: Estimated body mass index is 30.27 kg/m  as calculated from the following:    Height as of this encounter: 1.753 m (5' 9\").    Weight as of this encounter: 93 kg (205 lb)., PRESENT ON ADMISSION          Disposition Plan     Expected Discharge Date: 05/19/2023      Destination: home with family            Paula Jacobo MD  Hospitalist Service, GOLD TEAM 21  [facility]  Securely message with [employer] (more info)  Text page via [facility] Paging/Directory   See signed in provider for up to date coverage information  ______________________________________________________________________    Interval History   Feeling much better today. Denies any chest pain, abdominal pain, nausea, or vomiting. More energy today. Questions and concerns answered and addressed.     Physical Exam   Vital Signs: Temp: 98.6  F (37  C) Temp src: Oral BP: (!) 152/102 Pulse: 66   Resp: 16 SpO2: 95 % O2 Device: None (Room air)    Weight: 205 lbs 0 oz    General: Awake, alert, no distress. Pleasant and conversant  Eyes: Sclera " anicteric. No conjunctival injection. PERRLA.   Respiratory: Breathing comfortably in room air   Extremities: No lower extremity edema.   Skin: Warm, dry. No rash on visible skin.   Neuro: Alert, oriented to conversation and situation. Face symmetric, speech intact. Moves all extremities.   Psych: Normal affect.       Medical Decision Making       55 MINUTES SPENT BY ME on the date of service doing chart review, history, exam, documentation & further activities per the note.      Data     I have personally reviewed the following data over the past 24 hrs:    10.8  \   13.7   / 192     135 (L) 99 14.3 /  203 (H)   4.0 27 0.55 (L) \       ALT: 16 AST: 16 AP: 58 TBILI: 0.7   ALB: 3.3 (L) TOT PROTEIN: 4.9 (L) LIPASE: N/A       Procal: N/A CRP: N/A Lactic Acid: N/A         Imaging results reviewed over the past 24 hrs:   No results found for this or any previous visit (from the past 24 hour(s)).

## 2023-05-18 NOTE — CONSULTS
"New In-Patient Diabetes/Hyperglycemia Management Consult  Connor Emerson  Age: 43 year old  MRN # [id number]   YOB: 1980    Chief Complaint: metastatic non-small cell lung carcinoma with brain metastases.   Reason for consult: \"HHS\"  Consult requested by: Kole Graham DO    All information gathered via chart review and face-to-face interview and assessment  Additional historian: none  Unique sources of records reviewed:  All records reviewed available via Epic   Professional  utilized --> No    History of Present Illness:  Connor Emerson is a very pleasant 43-year-old  male admitted on 5/17/2023.  Patient unfortunately carries a diagnosis of metastatic non-small cell lung carcinoma with brain metastases.  Patient takes PTA Decadron.  Patient is presenting to this facility today with hyperosmolar hyperglycemic state.  Patient was started on insulin drip in the emergency department.  Endocrinology will be consulted to manage transition to basal bolus insulin regimen.  Patient reports recent 15 pound unintended weight loss.  Patient reports tinnitus, periorbital edema, and anorexia.  Patient will be admitted to the hospitalist service for management of hyperosmolar hyperglycemic state.    Diabetes focused:    Connor Emerson is a very pleasant 43 year old male with a long-standing hx of T2DM diagnosed in 2015 following symptomatic presentation of polydipsia to his PCP office which resulted in A1c of  8.6%, he was started on Metformin at that time.  He has been intermittent with his compliance with metformin.      Office notes from 5/12/2020 reveal he again presented with sxs of hyperglycemia - dry mouth, urinary frequency and had not been taking his metformin.  A1c was 11.9%.  He was re-started on his Metformin and started on a low dose of Lantus 15 unit(s) daily and that time.    Previous admission from 9/28/2022 reveals he has been followed by Endocrinology - he does not recall seeing " an Endocrinologist.   PTA meds as follows:   Mohan CGM - not using   Novolog fixed dose w meals TID 2 unit(s) with meals - last dose at least 1 month ago  Lantus 10-20 unit(s) once daily - last dose at least 1 month ago   Novolog custom 1/20 >150 TID AC sliding scale insulin - not using   Metformin XR 1500 mg at supper - same, not using, last dose at least 1 month ago    Connor reports  when checking BG and taking meds - BG would be 120-130's and that is why he would stop meds.  He would take about 5-6 unit(s) on lantus per day, maybe 2 unit(s) with 1-3 meals, no sliding scale insulin, and 1500 mg of Metformin XR at supper.     Currently, he is feeling much better.  Stomach pains, dry mouth all resolved.  He is eating and denies nausea.  No NPO or other changes known to the steroid plan.   Would like to get off the IV insulin and we reviewed why today given the very high doses with the steroids, that may be a challenge but by tomorrow we might very well be able to safely.   He verbalizes understanding.   He would like a plan that is simplified for home so he is able to comply.       Last dose insulin PTA:  See above   Inpatient regimen at time of consult:  On IV insulin, no meal insulin ordered, rates on drip variable as result. On Dex 4 mg daily.   Addm:  12:47 PM Hospitalist updates he will go to 2 mg daily starting TOMORROW     BG at time of consult: 137 - 514 mg/dL    Unique/Relevant Labs:   Na 135 (129 on admission)  K 3.2 (4.3 on admission)  Bicarb 27 (20 on admission)  Anion Gap 9 (17 on admission)  Creat 0.55/GFR >90  Hgb 13.7  Lactic acid 1.3  BhB 1.8  Osmolality 315  Covid negative  Hgb A1c 8.3% (4/6/2023)      Orders Placed This Encounter      Combination Diet Regular Diet Adult; Moderate Consistent Carb (60 g CHO per Meal) Diet    Other Active/Contributory Medical Problems:   D5W-containing solutions/medications/confounders: none  Planned Procedures/surgeries: none  ELOS:  TBD    Diabetes:  Recent Labs    Lab 05/18/23  0840 05/18/23  0704 05/18/23  0609 05/18/23  0604 05/18/23  0503 05/18/23  0407   * 164* 154* 170* 110* 204*       Diabetes Type:  Type 2 Diabetes  SADIA-65 rafael/c-peptide/islet cell rafael - none found in Epic review   Glutamic acid <5 9/2015  C peptide 6.4 9/2015    Diabetes Duration: 9/29/2015 presented with A1c of 8.6%    Diabetes Control:   Lab Results   Component Value Date    A1C 8.3 04/06/2023    A1C 9.5 11/08/2022    A1C 12.3 06/24/2022    A1C 10.1 01/19/2022    A1C 11.9 05/12/2020    A1C 6.3 08/10/2018    A1C 6.5 11/27/2017    A1C 6.4 06/29/2017    A1C 6.5 02/13/2017       Usual (PTA) Diabetes Regimen:   Medications:   Has not taken any medications for past month at least   Mohan CGM - not using   Novolog fixed dose w meals TID 2 unit(s) with meals - last dose at least 1 month ago  Lantus 10-20 unit(s) once daily - last dose at least 1 month ago   Novolog custom 1/20 >150 TID AC sliding scale insulin - not using   Metformin XR 1500 mg at supper - same, not using, last dose at least 1 month ago    BG monitoring frequency:  None past month or longer  BG trends at home: when checking and taking meds - BG would be 120-130's and that is why he would stop meds.       Diet: no restrictions - 2 meals per day   Bf: rarely eats breakfast   Lunch: varies  Dinner: varies  Snacks: not really a snacker   Beverages: water, diet coke, zero sugar orange pop, 1 cup of coffee in AM - creamer only    Issues with food insecurity:  no  Recent weight changes:  Lost about 20 lbs in past 1 month   Exercise: feet all day - maintenance     Ability to [place] Prescribed Regimen:  Procrastinate - I forget     Diabetes Complications:    retinopathy  no  last dilated eye exam: 6 months ago   Peripheral neuropathy:  no  Nephropathy:  kidneys  microalbuminuria +  Known autonomic neuropathy: denies, specifically gastroparesis:  no, gastric-emptying test no  Dizziness/fainting when standing: no  Neurogenic bladder:  no    History of DKA:   +   Able to Detect Hypoglycemia: yes  Safety Kit:  Yes - glucagon and tablets  Medic Alert:  no  Usual Diabetes Care Provider/CDCES: Radha Shetty Ra    Review of Systems:  CC:  denies all this AM   Constitutional:   No fever, no chills  ENT/Mouth:   Hearing at level of conversation, denies hearing changes, no ear pain, no sore throat, no rhinorrhea, no difficulty swallowing  Eyes:  No eye pain, no discharge, no vision changes  CV:   No CP/SOB, no new edema  Resp:  No cough, no wheezing  GI:   Denies nausea, no vomiting, no constipation, no diarrhea  :  No dysuria, no frequency, no hematuria  Musk:  No joint swelling/pain, No back pain  Skin/heme:   No new rashes/bruises/open areas.  No pruritis  Neuro:   No new weakness, no numbness/tingling, no headache  Psych:   No new anxiety, denies changes/worsening mood concerns  Endocrine:  No polyuria, no polydipsia    Past medical, family and social histories are reviewed and updated.    Past Medical History  Past Medical History:   Diagnosis Date     Atypical chest pain 12/02/2013     Cancer (H)      Complication of anesthesia      Diabetes (H)      GERD (gastroesophageal reflux disease) 12/02/2013     History of pulmonary embolism      HTN (hypertension) 05/14/2012     HTN, goal below 140/90 07/02/2013     Insomnia 02/21/2012     Mediastinal lymphadenopathy      Migraine headache 07/02/2013     Migraine with aura, without mention of intractable migraine without mention of status migrainosus      Pneumonia        Family History  Family History   Problem Relation Age of Onset     Unknown/Adopted Mother      Uterine Cancer Mother      Unknown/Adopted Father      Unknown/Adopted Maternal Grandmother      Unknown/Adopted Maternal Grandfather      Stomach Cancer Maternal Grandfather      Unknown/Adopted Paternal Grandmother      Unknown/Adopted Paternal Grandfather      Melanoma Maternal Uncle        Social History  Social History     Socioeconomic  "History     Marital status:      Spouse name: Kylie     Number of children: 4   Tobacco Use     Smoking status: Never     Smokeless tobacco: Never   Vaping Use     Vaping status: Never Used     Passive vaping exposure: Yes   Substance and Sexual Activity     Alcohol use: Yes     Alcohol/week: 0.0 standard drinks of alcohol     Comment: social     Drug use: No     Sexual activity: Yes     Partners: Female   Other Topics Concern     Parent/sibling w/ CABG, MI or angioplasty before 65F 55M? No     Marital Status:   Lives with wife in [place]    Physical Exam:  BP (!) 152/102 (BP Location: Right arm)   Pulse 66   Temp 98.6  F (37  C) (Oral)   Resp 16   Ht 1.753 m (5' 9\")   Wt 93 kg (205 lb)   SpO2 95%   BMI 30.27 kg/m        General:  Pleasant, no acute distress.  HEENT: Hearing WNL   Lungs: non-labored, no cough, no SOB/wheezing   Mental status: Alert, oriented x3, communicating clearly, memory intact.  Psych: calm, appropriate mood, congruent affect.    Laboratory  Recent Labs   Lab Test 05/18/23  0840 05/18/23  0704 05/18/23  0609 05/17/23  1947 05/17/23  1921 05/17/23  1618 05/17/23  1611 05/17/23  1428 05/17/23  1326   NA  --   --  135*  --   --   --   --   --  130*   POTASSIUM  --   --  3.2*  --  3.7  --   --   --  4.3   CHLORIDE  --   --  99  --   --   --   --   --  88*   CO2  --   --  27  --   --   --   --   --  20*   ANIONGAP  --   --  9  --   --   --   --   --  22*   * 164* 154*   < >  --    < >  --    < > 563*   BUN  --   --  14.3  --   --   --   --   --  28.6*   CR  --   --  0.55*  --   --   --  0.58*  --  0.79   TORY  --   --  8.1*  --   --   --   --   --  10.0    < > = values in this interval not displayed.     CBC RESULTS:   Recent Labs   Lab Test 05/18/23  0609   WBC 10.8   RBC 4.47   HGB 13.7   HCT 40.2   MCV 90   MCH 30.6   MCHC 34.1   RDW 12.2        Liver Function Studies -   Recent Labs   Lab Test 05/18/23  0609   PROTTOTAL 4.9*   ALBUMIN 3.3*   BILITOTAL 0.7 "   ALKPHOS 58   AST 16   ALT 16       Active Medications  Current Facility-Administered Medications   Medication     0.45% sodium chloride infusion     albuterol (PROVENTIL HFA/VENTOLIN HFA) inhaler     bisacodyl (DULCOLAX) EC tablet 5 mg    Or     bisacodyl (DULCOLAX) EC tablet 10 mg     brigatinib (ALUNBRIG) tablet 90 mg     celecoxib (celeBREX) capsule 100 mg     citalopram (celeXA) tablet 20 mg     dexamethasone (DECADRON) tablet 4 mg     dextrose 5% and 0.45% NaCl infusion     dextrose 50 % injection 25-50 mL     glucose gel 15-30 g    Or     dextrose 50 % injection 25-50 mL    Or     glucagon injection 1 mg     enoxaparin ANTICOAGULANT (LOVENOX) injection 40 mg     hydrochlorothiazide (HYDRODIURIL) tablet 25 mg     insulin 1 unit/1 mL in NS (NovoLIN, HumuLIN Regular) drip -ED DKA algorithm     [Held by provider] insulin aspart (NovoLOG) injection (RAPID ACTING)     [Held by provider] insulin aspart (NovoLOG) injection (RAPID ACTING)     [Held by provider] insulin aspart (NovoLOG) injection (RAPID ACTING)     [Held by provider] insulin glargine (LANTUS PEN) injection 10-20 Units     lidocaine (LMX4) cream     lidocaine 1 % 0.1-1 mL     lisinopril (ZESTRIL) tablet 40 mg     melatonin tablet 1 mg     [Held by provider] metFORMIN (GLUCOPHAGE XR) 24 hr tablet 1,500 mg     methadone (DOLOPHINE) half-tab 2.5 mg     methadone (DOLOPHINE) tablet 5 mg     naloxone (NARCAN) injection 0.2 mg    Or     naloxone (NARCAN) injection 0.4 mg    Or     naloxone (NARCAN) injection 0.2 mg    Or     naloxone (NARCAN) injection 0.4 mg     ondansetron (ZOFRAN ODT) ODT tab 4 mg    Or     ondansetron (ZOFRAN) injection 4 mg     oxyCODONE (ROXICODONE) tablet 5-10 mg     potassium chloride 10 mEq in 100 mL sterile water infusion     prochlorperazine (COMPAZINE) injection 10 mg    Or     prochlorperazine (COMPAZINE) tablet 10 mg    Or     prochlorperazine (COMPAZINE) suppository 25 mg     propranolol (INDERAL) tablet 20 mg     sodium  chloride (PF) 0.9% PF flush 3 mL     sodium chloride (PF) 0.9% PF flush 3 mL     [Held by provider] zolpidem (AMBIEN) tablet 5 mg     No current outpatient medications on file.       Assessment:    1)  Type 2 Diabetes Mellitus; presented in DKA (elevated BG, bicarb 20, AG 17, ketones 1.8) with hx of variable control/med compliance.  A1c 8.3% - in context of anemia/complex illness  2)  Acute steroid exacerbated hyperglycemia   3)  Metastatic non-small cell lung carcinoma with brain metastases      Plan:      -  Now that metabolic derangements resolved, will switch to NON-DKA IV insulin protocol. Is off dextrose fluids and PO has been advanced.  Will add meal insulin.     -  Will add lantus dose of 35 unit(s) now (1200) to reduce the significant rates with plan to transition in AM - recommend reduction in Alg 2 once given for safety and titrate from there.     -  Add Novolog meal coverage 1 unit(s) per 8 g cho AC meals/snacks    -  BG monitoring per IV q 1-2 hours     -  PTA meds : HOLDING Metformin at this time - will resume once more metabolically stable, off drip and closer to discharge.     -  Hypoglycemia protocol    -  Recommend carb counting protocol    -  Education :  Has done insulin in past - has barriers to regimen, needs a simplified plan so he remains compliant.     -  Outpatient follow up: prefers PCP for follow up     -  Please do not hesitate to contact the team with any questions/concerns.     -  Please notify inpatient diabetes service if changes are planned that will impact glycemia, such as NPO, starting/adjusting steroids, enteral feeding, parenteral feeding, dextrose fluids or procedures.       -  Thank you for this consult, IDS will follow    JERRY [facility], [facility]  Inpatient Diabetes Management Service  Pager - [phone]  Available on YapTime    To contact Endocrine Diabetes service:   From 7 AM-5 PM: page inpatient diabetes provider who is following the patient that day (see filed  or incomplete progress notes/consult notes).  If uncertain of provider assignment: page job code 3 *'s,  [phone] then enter [phone].  For questions or updates from 5PM-7AM: page the diabetes job code for on call fellow: [phone]    Please notify inpatient diabetes service if changes are planned to steroids, nutrition, or if procedures are planned requiring prolonged NPO status.Diabetes Management Team job code: [phone]    Follow up was done today via virtual/telephone encounter  Start of call [phone]  End of call 1148    I spent a total of 110 minutes on the date of the encounter doing prep/post-work, chart review, history and exam, documentation and further activities per the note including lab review, multidisciplinary communication, counseling the patient and/or coordinating care regarding acute hyper/hypoglycemic management, as well as discharge management and planning/communication.  See note for details.

## 2023-05-18 NOTE — CONSULTS
Oncology  Consult Note   Date of Service: 05/17/2023    Patient: Connor Emerson  MRN: [id number]  Admission Date: 5/17/2023  Hospital Day # 0  Cancer Diagnosis: NSCLC  Primary Outpatient Oncologist: Dr. Persaud KATARZYNA  Current Treatment Plan:  brigatinib 180 mg daily     Reason for Consult: brain mets on steroid, admit for HHS/hyperglycemia    History of Present Illness:    Connor Emerson is a 42 year old  male who is a non-smoker with PMHx of T2DM, HTN with metastatic NSCLC, who presented with HHS.    The pt had a h/o metastatic NSCLC diagnosed in 1/2022, f/up by Dr. Persaud at the [facility]. His has been on alectinib (stopped due to persistent arthralgias), then brigatinib since 2/22/2023. His last imagings prior to this admission are CT c/a/p (4/20) that showed no e/o progression with stable pulm nodules and MRI brain (4/20) that showed progression based on the increased size of the largest mets in the R frontal lobe and multiple new/enlarging mets throughout the cerebral/cerebellum hemispheres. Due to the Mri brain from 4/20 showing progression of his brain mets, his briganitib was increased to 180mg daily from 90mg. He was cont on dex 4mg daily. He was also seen by NSG on 4/28, who suggested to cont on dex taper over the next month and reimage in 6 wks, given that the R frontal lesion can be radiation necrosis.     Yesterday, pt was seen by our clinic due to feeling unwell. He was found to have hyperglycemia with glu of 627, along with polyuria and polydipsia. Due to this, pt was sent to the ED.     W/up in the ED showed AG 22, corrected Na 139-140, K 4.3, Scr 0.79, Ca 10, wnl LFTs, positive serum ketone 1.8, VBG 7.16/25/--/25, WBC 16.1. U/A was neg, neg covid-19/flu/RSV, neg CXR. It was felt that the pt has HHS, started on insulin gtt, fluids.    At the time of my encounter, pt states that he has missed his insulin for 2-3 wks on and off prior to admission. Then for one week prior to admission, he  felt unwell, having polyuria, polydipsia. The patient does not report any SOB, CP, abd pain/n/v/d, dysuria, joint pain, rash, or fever. No recent weight loss. ROS positive for R eyelid swelling on admission, which the pt states it has gone now.     Oncologic History (from Dr. Persaud's note):  Diagnosis:   Stage IV NSCLC, Rt lung adenocarcinoma with metastasis to pleura, mediastinum , rt pleural effusion and brain diagnosed 1/2022 (AJCC 8th edition)  PD-L1 TPS 2-3% by [employer]   NGS [facility] panel-EML4:ALK rearragement  NGS [facility]- GNAS R201H, KRAS K5E- No ALK     Treatment:    2/23/2023- current: Brigatinib. Dose reduced to 60 mg due to cough after two days of 90 mg daily -->then back on 90 mg ---> most recently upto 180 mg daily.     3/2/22- 12/2022 Alectinib 300 mg BID (Dose reduced to 450 mg BID from 600 mg BID due to grade 3 myalgias 3/21/22, again reduced to 300 mg BID 9/28/22)     Past:  2/15/22- GK to 11 brain lesions  6/28/22- Craniotomy, resection  9/28/22-10/26- Bevacizumab for radiation necrosis (stopped due to PE)      Intent of treatment: Palliative     Oncologic course:  1/19/22 to 1/22/22-Admitted to [facility] for 2 week progressive SOB secondary to have large rt sided pleural effusion, needing thoracentesis x2 (1.7L and 2.0 L removed), cytology positive for malignancy, adenocarcinoma.   1/26/22- Rt pleural mass biopsy-Dr. Agrawal--POSITIVE FOR ADENOCARCINOMA CONSISTENT WITH LUNG PRIMARY, admixed with mesothelial hyperplasia and inflammatory infiltrate (+ TTF-1 and CK 7;  negative  p40, calretinin and WT-1. PAX8 immunostain focal +). 4th thoracentesis done simultaneously - 3L approx removed.   2/1/22- PET/CT-Right lower lobe central infiltrative FDG avid 8.2 x 9.6 cm mass representing a primary lung adenocarcinoma. Ipsilateral right perihilar, bilateral pretracheal, subcarinal and superior mediastinal michele metastases. Contralateral mildly FDG avid few lung nodules are suspicious for contralateral  metastasis. At least 3 intracranial metastases in the right frontal lobe, left frontal lobe and left cerebellar hemisphere. Nonspecific mild diffuse bone marrow uptake. Further evaluation with a spine MRI could be considered to rule out early marrow infiltration. This could also be seen with red marrow conversion.  2/5/22-  Brain MRI- At least 9 intracranial metastases as detailed above. The dominant lesions involving the orbital right frontal lobe, the posterior left middle frontal gyrus, anterior right temporal lobe and in the left cerebellar hemisphere have surrounding moderate vasogenic type edema.  2/15/22- Saw Dr. Arango from Rad Onc- Rcd GK to 12 lesion in bran  2/16/22- Pleurex placement   3/2/22- Started Alectinib 600 mg BID  3/21/22- Dose reduced to 450 mg BID due to grade 3 myalgias and fatigue  4/2/22 to 4/5/22- Admitted at [facility] for- Severe sepsis due to MSSA infection of right PleurX catheter s/p removal- He presented with onset of pain at tube site starting 4/1; at arrival was tachycardic with leukocytosis (22.7) and elevated lactic acid (2.9).  CT chest showed fluid and stranding tracking outside the pleural space into chest wall along pleural catheter.  IR was consulted and removed catheter 4/2 with report of pustular drainage and tip culture growing MSSA.  Thoracic Surg was consulted who felt no surgical indication necessary given minimal pleural fluid and lack of any signs of abscess.  Initially treated with broad spectrum coverage for sepsis, narrowed to Ancef once sensitivities returned with plan to transition to cefadroxil for an additional 10 days at discharge per ID. Held drug 4/2 to 4/11 5/2/22- CT CAP- Overall, positive response to therapy with decreased size of right lower lobe and right pleural-based masses, pulmonary metastases, hilar and mediastinal lymphadenopathy. However, a single right posterior pleural-based mass has slightly increased in size since 2/24/2022. No metastatic  disease in the abdomen and pelvis. Right Pleurx catheter has been removed. Trace right pleural effusion and right basilar atelectasis.  5/2/22- Brain MRI- The previously demonstrated brain metastases are mildly diminished in size versus to 2/5/2022. The degree of edema is also diminished but not completely resolved. Probable trace amounts of intralesional bleeding demonstrated on the gradient sequence within the metastases. No definite new metastasis or progressive mass effect. No hydrocephalus or infarct.     6/15/22 to 6/17/22- Admitted at [facility]-with aphasia and word finding difficulty over last few weeks.  He presented to [facility] ED on 6/10 for evaluation of his symptoms. MRI brain showed multiple intracranial metastases, with interval enlargement of the dominant lesion within the left frontal lobe and increased surrounding vasogenic edema with 2 mm rightward shift of the septum pellucidum. Due to his worsening anxiety, he left AMA. His symptoms continued to progress to where he could not write at work so he decided to go to the ED for re-evaluation and treatment. Evaluated by NSGY, Rad Onc (radiaiton necrosis vs tumor progression).  6/16/22- MR Brain (6/16) shows multiple intracranial metastases, with interval enlargement  of the dominant lesion within the left frontal lobe and increased surrounding vasogenic edema with 2 mm rightward shift of the septum pellucidum.  6/16/22- - CT CAP shows slightly decreased size of right lower lobe and right pleural-based masses. No new pulmonary nodules or lymphadenopathy; No evidence of metastatic disease in the abdomen or pelvis.   6/28/22 to 6/30/22- Admitted at [facility]- Elective left Stealth craniotomy with resection of brain tumor due to ongoing symptoms. No intraoperative complications. EBL 50 ml.  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  7/5/22- Ct CAP- Right lower lobe low-density nodules are not significantly changed. A small left upper lobe pulmonary  nodule is also unchanged. Trace pleural fluid on the right has increased slightly. No convincing evidence for metastatic disease in the abdomen or pelvis.  7/18/22 to 7/19/22- Admitted to [facility] for seizure- Reportedly was only taking once Keppra instead of twice daily. Also resumed on dexamethasone 2 mg daily  8/1/22- Brain MRI- Redemonstrated postsurgical changes status post left frontoparietal Craniotomy. Interval increase in size of the dominant ring-enhancing lesion in the left posterior superior frontal lobe with increased moderate surrounding vasogenic edema and local mass effect resulting in narrowing of the supratentorial ventricular system. No significant midline shift/herniation at this time  8/1/22- Dex was increased to 4 mg daily by Dr. Moran  9/1/22- CT Chest- Near resolution of previously seen right pleural nodule. Stable right lower lobe pulmonary nodule  9/28/22- Bevacizumab for radiation necrosis  10/26/22- Bevacizumab   10/26/22- Ct CAP- Stable posterior medial right lower lobe 1.9 x 1.1 cm nodule series 8 image 176. Adjacent stable scarring and atelectasis. The previously noted pleural nodule posteriorly on the right is not currently clearly identified. Stable left upper lobe 0.3 cm nodule image 56  10/26/22- Brain MRI- Overall improved appearance of multiple intracranial metastases with near resolution of associated edema and diminished enhancement and size of multiple residual lesions. No definite new or progressive metastasis.  11/7/22 to 11/9/22- Admitted for PE and HTN urgency- Small pulmonary embolism in the right lower lobe pulmonary artery. started on Lovenox, Brain MRI neg for PRES.  12/29/22- ED visit- bilateral hip pain, pain in shoulders, knuckles, knees, and ankles- holding alectinib since 12/20/22 1/6/23- Ct CAP- Mild groundglass nodularity in the left upper lobe is new since the previous exam, and may be infectious in etiology. No other significant interval change. Pulmonary nodules  are not significantly changed.  1/6/23- Brain MRI- Stable to diminished sequelae of intracranial metastasis and treatment changes. No new or progressive metastasis. No superimposed acute intracranial finding.    2/23/2022- Start Brigatinib. Dose reduced to 60 mg due to cough after two days of 90 mg daily -  3/23/23-Brigatinib  (increase to 90 mg)  4/20/23- CT CAP- Stable- Previously noted mild groundglass nodularity in the left upper lobe has resolved.Pulmonary nodules are unchanged.Trace amount pleural fluid on the right has decreased slightly.  4/20/23- Brain MRI- Possile progression- Largest metastasis within the right frontal lobe has increased in size with worsening peripheral nodular enhancement and worsening vasogenic edema contributing to new right to left midline shift.  Multiple new/enlarging metastases scattered throughout the cerebral hemispheres and cerebellum.    Review of Systems:  A comprehensive ROS was performed and found to be negative or non-contributory with the exception of that noted in the HPI above.    Past Medical History:  Past Medical History:   Diagnosis Date     Atypical chest pain 12/02/2013     Cancer (H)      Complication of anesthesia      Diabetes (H)      GERD (gastroesophageal reflux disease) 12/02/2013     History of pulmonary embolism      HTN (hypertension) 05/14/2012     HTN, goal below 140/90 07/02/2013     Insomnia 02/21/2012     Mediastinal lymphadenopathy      Migraine headache 07/02/2013     Migraine with aura, without mention of intractable migraine without mention of status migrainosus      Pneumonia        Past Surgical History:  Past Surgical History:   Procedure Laterality Date     BRONCHOSCOPY RIGID OR FLEXIBLE W/TRANSENDOSCOPIC ENDOBRONCHIAL ULTRASOUND GUIDED Bilateral 1/26/2022    Procedure: Right BRONCHOSCOPY, FIBEROPTIC, endobronchial ultrasound, pleural biopsy;  Surgeon: Dallin Agrawal MD;  Location: [facility] OR     INJECT BLOCK MEDIAL BRANCH  CERVICAL/THORACIC/LUMBAR       INSERT CHEST TUBE Right 2/16/2022    Procedure: INSERTION, CATHETER, INTERCOSTAL, FOR DRAINAGE;  Surgeon: Dallin Agrawal MD;  Location: [facility] GI     INSERT CHEST TUBE Right 3/9/2022    Procedure: INSERTION, CATHETER, INTERCOSTAL, FOR DRAINAGE;  Surgeon: Sushila Antonio MD;  Location: [facility] GI     IR CHEST TUBE REMOVAL TUNNELED RIGHT  4/2/2022     OPTICAL TRACKING SYSTEM CRANIOTOMY, EXCISE TUMOR, COMBINED Left 6/28/2022    Procedure: Left stealth craniotomy for tumor resection with motor mapping;  Surgeon: Stephen Moran MD;  Location:  OR     ORTHOPEDIC SURGERY      Ganesh. Rotator cuff repair.     PLEUROSCOPY N/A 1/26/2022    Procedure: Pleuroscopy with Pleural Biopsy;  Surgeon: Dallin Agrawal MD;  Location:  OR       Social History:  Social History     Socioeconomic History     Marital status:      Spouse name: Kylie     Number of children: 4   Tobacco Use     Smoking status: Never     Smokeless tobacco: Never   Vaping Use     Vaping status: Never Used     Passive vaping exposure: Yes   Substance and Sexual Activity     Alcohol use: Yes     Alcohol/week: 0.0 standard drinks of alcohol     Comment: social     Drug use: No     Sexual activity: Yes     Partners: Female   Other Topics Concern     Parent/sibling w/ CABG, MI or angioplasty before 65F 55M? No        Family History  Family History   Problem Relation Age of Onset     Unknown/Adopted Mother      Uterine Cancer Mother      Unknown/Adopted Father      Unknown/Adopted Maternal Grandmother      Unknown/Adopted Maternal Grandfather      Stomach Cancer Maternal Grandfather      Unknown/Adopted Paternal Grandmother      Unknown/Adopted Paternal Grandfather      Melanoma Maternal Uncle        Outpatient Medications:  No current facility-administered medications on file prior to encounter.  albuterol (PROAIR HFA/PROVENTIL HFA/VENTOLIN HFA) 108 (90 Base) MCG/ACT inhaler, Inhale 2 puffs into the lungs every 6 hours as  needed for shortness of breath, wheezing or cough  brigatinib (ALUNBRIG) 30 MG TABS tablet, Take 3 tablets (90 mg) by mouth daily  celecoxib (CELEBREX) 100 MG capsule, Take 1 capsule (100 mg) by mouth 2 times daily  citalopram (CELEXA) 20 MG tablet, Take 1 tablet (20 mg) by mouth every evening  dexamethasone (DECADRON) 4 MG tablet, Take 4 mg by mouth daily (with breakfast)  hydrochlorothiazide (HYDRODIURIL) 25 MG tablet, Take 1 tablet (25 mg) by mouth daily  insulin aspart (NOVOLOG PEN) 100 UNIT/ML pen, Inject 2 Units Subcutaneous 3 times daily (before meals) Plus adjustment scale 1:20 for blood sugars >150mg/dL max daily 50 units  insulin glargine (LANTUS PEN) 100 UNIT/ML pen, Inject 10-20 Units Subcutaneous daily On hold 11/22. Increase as directed, Max daily dose 100 units  lisinopril (ZESTRIL) 40 MG tablet, Take 1 tablet (40 mg) by mouth daily  metFORMIN (GLUCOPHAGE XR) 500 MG 24 hr tablet, Take 3 tablets daily.  methadone (DOLOPHINE) 5 MG tablet, Take 0.5 tablets (2.5 mg) by mouth every morning AND 1 tablet (5 mg) At Bedtime.  naloxone (NARCAN) 4 MG/0.1ML nasal spray, Spray 1 spray (4 mg) into one nostril alternating nostrils as needed for opioid reversal every 2-3 minutes until assistance arrives  oxyCODONE (ROXICODONE) 5 MG tablet, Take 1-2 tablets (5-10 mg) by mouth every 4 hours as needed for moderate to severe pain  propranolol (INDERAL) 20 MG tablet, Take 1 tablet (20 mg) by mouth 2 times daily  zolpidem (AMBIEN) 5 MG tablet, Take 1 tablet (5 mg) by mouth nightly as needed for sleep  alcohol swab prep pads, Use to swab area of injection/jabier as directed.  blood glucose (NO BRAND SPECIFIED) test strip, Use to test blood sugar 2 times daily or as directed. To accompany: Blood Glucose Monitor Brands: per insurance.  blood glucose calibration (NO BRAND SPECIFIED) solution, To accompany: Blood Glucose Monitor Brands: per insurance.  blood glucose monitoring (NO BRAND SPECIFIED) meter device kit, Use to test  "blood sugar 2 times daily or as directed. Preferred blood glucose meter OR supplies to accompany: Blood Glucose Monitor Brands: per insurance.  Continuous Blood Gluc Sensor (FREESTYLE IRENE 2 SENSOR) MISC, 1 each every 14 days Apply as directed per  instructions  escitalopram (LEXAPRO) 10 MG tablet, Take 1 tablet (10 mg) by mouth daily (Patient not taking: Reported on 5/17/2023)  insulin pen needle (31G X 8 MM) 31G X 8 MM miscellaneous, Use one pen needles daily or as directed.         Physical Exam:    Blood pressure (!) 148/112, pulse 80, temperature 98.6  F (37  C), temperature source Oral, resp. rate 16, height 1.753 m (5' 9\"), weight 93 kg (205 lb), SpO2 97 %.  General:  Well appearing adult female in NAD.  Alert and oriented.  Eyes:  No erythema or discharge  Respiratory:  Breathing comfortably on room air.  No wheezing or distress.  Musculoskeletal:  Full ROM of the bilateral upper extremities.  Skin:  No visible concerning skin rashes or lesions  Neuro:  No notable tremor and dyskinetic movements.  Psych:  Mood and affect appear normal.     The rest of a comprehensive physical examination is deferred due to video visit restrictions.       Labs & Studies: I personally reviewed the following studies:  ROUTINE LABS (Last four results):  [facility]  Recent Labs   Lab 05/17/23  2145 05/17/23  2044 05/17/23  1947 05/17/23  1921 05/17/23  1837 05/17/23  1618 05/17/23  1611 05/17/23  1428 05/17/23  1326 05/17/23  1306 05/17/23  1023   NA  --   --   --   --   --   --   --   --  130*  --  129*   POTASSIUM  --   --   --  3.7  --   --   --   --  4.3  --  4.8   CHLORIDE  --   --   --   --   --   --   --   --  88*  --  87*   CO2  --   --   --   --   --   --   --   --  20*  --  25   ANIONGAP  --   --   --   --   --   --   --   --  22*  --  17*   * 176* 258*  --  254*   < >  --    < > 563*   < > 627*   BUN  --   --   --   --   --   --   --   --  28.6*  --  27.5*   CR  --   --   --   --   --   --  0.58*  --  0.79 "  --  0.74   GFRESTIMATED  --   --   --   --   --   --  >90  --  >90  --  >90   TORY  --   --   --   --   --   --   --   --  10.0  --  9.9   MAG  --   --   --  2.0  --   --   --   --   --   --  2.1   PROTTOTAL  --   --   --   --   --   --   --   --  6.8  --   --    ALBUMIN  --   --   --   --   --   --   --   --  4.4  --   --    BILITOTAL  --   --   --   --   --   --   --   --  0.9  --   --    ALKPHOS  --   --   --   --   --   --   --   --  95  --   --    AST  --   --   --   --   --   --   --   --  17  --   --    ALT  --   --   --   --   --   --   --   --  19  --   --     < > = values in this interval not displayed.     CBC  Recent Labs   Lab 05/17/23  1326 05/17/23  1023   WBC 17.0* 16.1*   RBC 5.63 5.69   HGB 17.6 17.5   HCT 48.9 48.2   MCV 87 85   MCH 31.3 30.8   MCHC 36.0 36.3   RDW 12.1 12.2    267     MRI brain w/w/o contrast 4/20/2023  IMPRESSION:  1. Largest metastasis within the right frontal lobe has increased in size with worsening peripheral nodular enhancement and worsening vasogenic edema contributing to new right to left midline shift.   2. Multiple new/enlarging metastases scattered throughout the cerebral hemispheres and cerebellum.    CT c/a/p 4/20/2023  IMPRESSION:   1.  Previously noted mild groundglass nodularity in the left upper lobe has resolved.  2.  Pulmonary nodules are unchanged.  3.  Trace amount pleural fluid on the right has decreased slightly.    CXR 5/17/2023  IMPRESSION: Negative chest. Lungs clear.    CT head 5/17/2023  IMPRESSION:  1. Stable head CT demonstrating presumed metastatic nodules in the inferior right frontal lobe and superior aspect of the left cerebellar hemisphere as well as postoperative changes to the high left frontal  lobe.  2. No evidence for acute intracranial pathology.    Assessment & Plan:   Connor Emerson is a 42 year old  male who is a non-smoker with PMHx of T2DM, HTN with metastatic NSCLC, who presented with HHS.    #Metastatic NSCLC with brain  metastases  #Hyperosmolar hyperglycemic state    1. The pt was admitted for HHS, in the setting of missing his insulin doses. No fever, infectious w/up by primary team was unremarkable. Pt was started on insulin gtt and fluids, and his labs (gap, glu) have improved as well as his symptoms.     2. The pt has been f/up by Dr. Persaud in our cancer clinic. His recent MRI brain from 4/20 showed progression of dz and there fore his brigatinib was increased to 180mg po daily. He also saw NSG Dr. Moran on 4/28 and plan was made for dexamethasone taper (4mg x2 wk, then 2mg x2 wks and off) with f/up imaging later. The pt has not reduced his dex dose and he was cont on 4mg daily after admission. We recommend to reduce his dex to 2mg, cont for 2 wks and stop.     Recommendations:   -please cont brigatinib 180mg po daily   -please decrease his dexamethasone to 2mg daily, cont for 2wks and then stop. The pt will have f/up with NSG with f/up brain imaging later (f/up appt with NSG Dr. Moran and MRI brain currently scheduled on 6/16).     Thank you for this interesting consult. We will continue to follow this patient. Please do not hesitate to page with any questions or concerns.    Patient was seen and plan of care was discussed with attending physician Dr. Martinez. Attestation to follow.     Lee Moraes MD, [facility] PGY4  Hematology/Oncology   Pager: [phone]        Physician Attestation   I saw this patient with the resident and agree with the resident/fellow's findings and plan of care as documented in the note.      Nino findings: Mr. Emerson is a 43 year old man with metastatic NSCLC to brain and type 2 diabetes mellitus. He is hospitalized for hyperosmolar hyperglycemic state on the [facility]. Following diagnosis of possible radiation necrosis as well as new small metastatic brain lesions he was started on dexamethasone 4 mg daily and increased dose of brigatinib. Dexamethasone was to be continued for 2 weeks, with  plan to taper down to 2 mg dexamethasone daily for 2 weeks, then off. He has not yet tapered down, which we would recommend at this time as he is overdue and this may help him control his blood sugars for home going. We also recommended that he continue on taking his brigatinib 180 mg daily. He has follow-up with neurosurgery scheduled coming up    We will follow peripherally.     Berhane Miller MD  Date of Service (when I saw the patient): 05/18/23

## 2023-05-18 NOTE — PLAN OF CARE
Goal Outcome Evaluation:      Plan of Care Reviewed With: patient    Overall Patient Progress: improvingOverall Patient Progress: improving    Outcome Evaluation: tolerating po intake, encouraged consistent carb intake (no need to over restrict), add multivit and thiamine

## 2023-05-18 NOTE — ED NOTES
Pt A/O x 4 very pleasant and cooperative. Independent with ADL's and ambulation.  PIV infusing L AC - insulin drip - Algorithm 2.  2 PIV's SL. R AC and L hand.  Voiding spontaneously. Last BM was this morning, pt refused Colace.

## 2023-05-19 DIAGNOSIS — I67.89 RADIATION THERAPY INDUCED BRAIN NECROSIS: Primary | ICD-10-CM

## 2023-05-19 DIAGNOSIS — C34.31 MALIGNANT NEOPLASM OF LOWER LOBE OF RIGHT LUNG (H): ICD-10-CM
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
## 2023-05-19 LAB
ALBUMIN SERPL BCG-MCNC: 3.5 G/DL (ref 3.5–5.2)
ALP SERPL-CCNC: 59 U/L (ref 40–129)
ALT SERPL W P-5'-P-CCNC: 18 U/L (ref 10–50)
ANION GAP SERPL CALCULATED.3IONS-SCNC: 7 MMOL/L (ref 7–15)
AST SERPL W P-5'-P-CCNC: 16 U/L (ref 10–50)
BASOPHILS # BLD AUTO: 0 10E3/UL (ref 0–0.2)
BASOPHILS NFR BLD AUTO: 0 %
BILIRUB SERPL-MCNC: 0.6 MG/DL
BUN SERPL-MCNC: 10.9 MG/DL (ref 6–20)
CALCIUM SERPL-MCNC: 9 MG/DL (ref 8.6–10)
CHLORIDE SERPL-SCNC: 101 MMOL/L (ref 98–107)
CREAT SERPL-MCNC: 0.53 MG/DL (ref 0.67–1.17)
DEPRECATED HCO3 PLAS-SCNC: 28 MMOL/L (ref 22–29)
EOSINOPHIL # BLD AUTO: 0.3 10E3/UL (ref 0–0.7)
EOSINOPHIL NFR BLD AUTO: 2 %
ERYTHROCYTE [DISTWIDTH] IN BLOOD BY AUTOMATED COUNT: 12.3 % (ref 10–15)
GFR SERPL CREATININE-BSD FRML MDRD: >90 ML/MIN/1.73M2
GLUCOSE BLDC GLUCOMTR-MCNC: 101 MG/DL (ref 70–99)
GLUCOSE BLDC GLUCOMTR-MCNC: 129 MG/DL (ref 70–99)
GLUCOSE BLDC GLUCOMTR-MCNC: 130 MG/DL (ref 70–99)
GLUCOSE BLDC GLUCOMTR-MCNC: 134 MG/DL (ref 70–99)
GLUCOSE BLDC GLUCOMTR-MCNC: 134 MG/DL (ref 70–99)
GLUCOSE BLDC GLUCOMTR-MCNC: 141 MG/DL (ref 70–99)
GLUCOSE BLDC GLUCOMTR-MCNC: 145 MG/DL (ref 70–99)
GLUCOSE BLDC GLUCOMTR-MCNC: 150 MG/DL (ref 70–99)
GLUCOSE BLDC GLUCOMTR-MCNC: 154 MG/DL (ref 70–99)
GLUCOSE BLDC GLUCOMTR-MCNC: 164 MG/DL (ref 70–99)
GLUCOSE BLDC GLUCOMTR-MCNC: 178 MG/DL (ref 70–99)
GLUCOSE BLDC GLUCOMTR-MCNC: 185 MG/DL (ref 70–99)
GLUCOSE BLDC GLUCOMTR-MCNC: 186 MG/DL (ref 70–99)
GLUCOSE BLDC GLUCOMTR-MCNC: 187 MG/DL (ref 70–99)
GLUCOSE BLDC GLUCOMTR-MCNC: 200 MG/DL (ref 70–99)
GLUCOSE BLDC GLUCOMTR-MCNC: 212 MG/DL (ref 70–99)
GLUCOSE BLDC GLUCOMTR-MCNC: 216 MG/DL (ref 70–99)
GLUCOSE BLDC GLUCOMTR-MCNC: 222 MG/DL (ref 70–99)
GLUCOSE BLDC GLUCOMTR-MCNC: 227 MG/DL (ref 70–99)
GLUCOSE BLDC GLUCOMTR-MCNC: 240 MG/DL (ref 70–99)
GLUCOSE BLDC GLUCOMTR-MCNC: 274 MG/DL (ref 70–99)
GLUCOSE BLDC GLUCOMTR-MCNC: 282 MG/DL (ref 70–99)
GLUCOSE BLDC GLUCOMTR-MCNC: 286 MG/DL (ref 70–99)
GLUCOSE BLDC GLUCOMTR-MCNC: 98 MG/DL (ref 70–99)
GLUCOSE SERPL-MCNC: 212 MG/DL (ref 70–99)
HCT VFR BLD AUTO: 41.6 % (ref 40–53)
HGB BLD-MCNC: 14.3 G/DL (ref 13.3–17.7)
IMM GRANULOCYTES # BLD: 0.1 10E3/UL
IMM GRANULOCYTES NFR BLD: 1 %
LYMPHOCYTES # BLD AUTO: 2.9 10E3/UL (ref 0.8–5.3)
LYMPHOCYTES NFR BLD AUTO: 20 %
MAGNESIUM SERPL-MCNC: 1.8 MG/DL (ref 1.7–2.3)
MCH RBC QN AUTO: 31 PG (ref 26.5–33)
MCHC RBC AUTO-ENTMCNC: 34.4 G/DL (ref 31.5–36.5)
MCV RBC AUTO: 90 FL (ref 78–100)
MONOCYTES # BLD AUTO: 0.6 10E3/UL (ref 0–1.3)
MONOCYTES NFR BLD AUTO: 4 %
NEUTROPHILS # BLD AUTO: 10.6 10E3/UL (ref 1.6–8.3)
NEUTROPHILS NFR BLD AUTO: 73 %
NRBC # BLD AUTO: 0 10E3/UL
NRBC BLD AUTO-RTO: 0 /100
PHOSPHATE SERPL-MCNC: 2.4 MG/DL (ref 2.5–4.5)
PLATELET # BLD AUTO: 185 10E3/UL (ref 150–450)
POTASSIUM SERPL-SCNC: 5.1 MMOL/L (ref 3.4–5.3)
PROT SERPL-MCNC: 5.1 G/DL (ref 6.4–8.3)
RBC # BLD AUTO: 4.62 10E6/UL (ref 4.4–5.9)
SODIUM SERPL-SCNC: 136 MMOL/L (ref 136–145)
WBC # BLD AUTO: 14.4 10E3/UL (ref 4–11)

## 2023-05-19 PROCEDURE — 258N000002 HC RX IP 258 OP 250

## 2023-05-19 PROCEDURE — 120N000002 HC R&B MED SURG/OB UMMC

## 2023-05-19 PROCEDURE — 258N000003 HC RX IP 258 OP 636: Performed by: NURSE PRACTITIONER

## 2023-05-19 PROCEDURE — 250N000009 HC RX 250: Performed by: NURSE PRACTITIONER

## 2023-05-19 PROCEDURE — 250N000012 HC RX MED GY IP 250 OP 636 PS 637: Performed by: STUDENT IN AN ORGANIZED HEALTH CARE EDUCATION/TRAINING PROGRAM

## 2023-05-19 PROCEDURE — 84100 ASSAY OF PHOSPHORUS: CPT | Performed by: STUDENT IN AN ORGANIZED HEALTH CARE EDUCATION/TRAINING PROGRAM

## 2023-05-19 PROCEDURE — 80053 COMPREHEN METABOLIC PANEL: CPT | Performed by: INTERNAL MEDICINE

## 2023-05-19 PROCEDURE — 250N000013 HC RX MED GY IP 250 OP 250 PS 637: Performed by: NURSE PRACTITIONER

## 2023-05-19 PROCEDURE — 99233 SBSQ HOSP IP/OBS HIGH 50: CPT | Performed by: NURSE PRACTITIONER

## 2023-05-19 PROCEDURE — 250N000011 HC RX IP 250 OP 636: Performed by: INTERNAL MEDICINE

## 2023-05-19 PROCEDURE — 99232 SBSQ HOSP IP/OBS MODERATE 35: CPT | Performed by: STUDENT IN AN ORGANIZED HEALTH CARE EDUCATION/TRAINING PROGRAM

## 2023-05-19 PROCEDURE — 83735 ASSAY OF MAGNESIUM: CPT | Performed by: STUDENT IN AN ORGANIZED HEALTH CARE EDUCATION/TRAINING PROGRAM

## 2023-05-19 PROCEDURE — 36415 COLL VENOUS BLD VENIPUNCTURE: CPT | Performed by: INTERNAL MEDICINE

## 2023-05-19 PROCEDURE — 85025 COMPLETE CBC W/AUTO DIFF WBC: CPT | Performed by: INTERNAL MEDICINE

## 2023-05-19 PROCEDURE — 250N000011 HC RX IP 250 OP 636: Performed by: FAMILY MEDICINE

## 2023-05-19 PROCEDURE — 250N000013 HC RX MED GY IP 250 OP 250 PS 637: Performed by: INTERNAL MEDICINE

## 2023-05-19 RX ORDER — METFORMIN HCL 500 MG
500 TABLET, EXTENDED RELEASE 24 HR ORAL 2 TIMES DAILY WITH MEALS
Status: DISCONTINUED | OUTPATIENT
Start: 2023-05-19 | End: 2023-05-20 | Stop reason: HOSPADM

## 2023-05-19 RX ORDER — SODIUM CHLORIDE 450 MG/100ML
INJECTION, SOLUTION INTRAVENOUS
Status: COMPLETED
Start: 2023-05-19 | End: 2023-05-19

## 2023-05-19 RX ORDER — SODIUM CHLORIDE 9 MG/ML
INJECTION, SOLUTION INTRAVENOUS CONTINUOUS
Status: DISCONTINUED | OUTPATIENT
Start: 2023-05-19 | End: 2023-05-20 | Stop reason: HOSPADM

## 2023-05-19 RX ADMIN — PROPRANOLOL HYDROCHLORIDE 20 MG: 10 TABLET ORAL at 08:10

## 2023-05-19 RX ADMIN — SODIUM CHLORIDE 1000 ML: 4.5 INJECTION, SOLUTION INTRAVENOUS at 14:15

## 2023-05-19 RX ADMIN — HUMAN INSULIN 8.5 UNITS/HR: 100 INJECTION, SOLUTION SUBCUTANEOUS at 18:36

## 2023-05-19 RX ADMIN — POTASSIUM & SODIUM PHOSPHATES POWDER PACK 280-160-250 MG 1 PACKET: 280-160-250 PACK at 19:17

## 2023-05-19 RX ADMIN — SODIUM CHLORIDE 1000 ML: 4.5 INJECTION, SOLUTION INTRAVENOUS at 11:08

## 2023-05-19 RX ADMIN — THIAMINE HCL TAB 100 MG 100 MG: 100 TAB at 08:12

## 2023-05-19 RX ADMIN — PROPRANOLOL HYDROCHLORIDE 20 MG: 10 TABLET ORAL at 19:17

## 2023-05-19 RX ADMIN — DEXTROSE AND SODIUM CHLORIDE 1000 ML: 5; 450 INJECTION, SOLUTION INTRAVENOUS at 00:38

## 2023-05-19 RX ADMIN — Medication 5 MG: at 19:17

## 2023-05-19 RX ADMIN — POTASSIUM & SODIUM PHOSPHATES POWDER PACK 280-160-250 MG 1 PACKET: 280-160-250 PACK at 16:31

## 2023-05-19 RX ADMIN — THERA TABS 1 TABLET: TAB at 08:10

## 2023-05-19 RX ADMIN — INSULIN ASPART 10 UNITS: 100 INJECTION, SOLUTION INTRAVENOUS; SUBCUTANEOUS at 08:36

## 2023-05-19 RX ADMIN — POTASSIUM & SODIUM PHOSPHATES POWDER PACK 280-160-250 MG 1 PACKET: 280-160-250 PACK at 12:14

## 2023-05-19 RX ADMIN — CELECOXIB 100 MG: 100 CAPSULE ORAL at 19:17

## 2023-05-19 RX ADMIN — METFORMIN HYDROCHLORIDE 500 MG: 500 TABLET, EXTENDED RELEASE ORAL at 16:38

## 2023-05-19 RX ADMIN — LISINOPRIL 40 MG: 40 TABLET ORAL at 08:10

## 2023-05-19 RX ADMIN — HYDROCHLOROTHIAZIDE 25 MG: 25 TABLET ORAL at 08:12

## 2023-05-19 RX ADMIN — HUMAN INSULIN 4 UNITS/HR: 100 INJECTION, SOLUTION SUBCUTANEOUS at 07:56

## 2023-05-19 RX ADMIN — HUMAN INSULIN 10 UNITS/HR: 100 INJECTION, SOLUTION SUBCUTANEOUS at 15:01

## 2023-05-19 RX ADMIN — CITALOPRAM HYDROBROMIDE 20 MG: 20 TABLET ORAL at 19:17

## 2023-05-19 RX ADMIN — CELECOXIB 100 MG: 100 CAPSULE ORAL at 08:10

## 2023-05-19 RX ADMIN — Medication 2.5 MG: at 08:12

## 2023-05-19 RX ADMIN — HUMAN INSULIN 8 UNITS/HR: 100 INJECTION, SOLUTION SUBCUTANEOUS at 01:49

## 2023-05-19 RX ADMIN — DEXAMETHASONE 2 MG: 2 TABLET ORAL at 08:12

## 2023-05-19 RX ADMIN — HUMAN INSULIN 6 UNITS/HR: 100 INJECTION, SOLUTION SUBCUTANEOUS at 11:07

## 2023-05-19 RX ADMIN — ENOXAPARIN SODIUM 40 MG: 40 INJECTION SUBCUTANEOUS at 19:19

## 2023-05-19 RX ADMIN — METFORMIN HYDROCHLORIDE 500 MG: 500 TABLET, EXTENDED RELEASE ORAL at 12:14

## 2023-05-19 RX ADMIN — DEXTROSE AND SODIUM CHLORIDE 1000 ML: 5; 450 INJECTION, SOLUTION INTRAVENOUS at 04:51

## 2023-05-19 RX ADMIN — SODIUM CHLORIDE: 9 INJECTION, SOLUTION INTRAVENOUS at 19:23

## 2023-05-19 ASSESSMENT — ACTIVITIES OF DAILY LIVING (ADL)
ADLS_ACUITY_SCORE: 18
DEPENDENT_IADLS:: INDEPENDENT
ADLS_ACUITY_SCORE: 18

## 2023-05-19 NOTE — DISCHARGE INSTRUCTIONS
IP Diabetes Management Team Discharge Instructions    Glucose Control Regimen:     - Lantus (glargine) 70 units at bedtime. Decrease dose by 5 units for any overnight low blood sugars  - Meal time insulin as follows:     Pre-Meal Blood Sugar Mealtime insulin dose (while on 2mg dexamethasone) Correction for high blood sugar dose while on 2mg dexamethasone (no meal)   <70 Treat low blood sugar Treat low blood sugar    18 0   101-130 20 2   131-160 22 4   161-190 24 6   191-220 26 8   221-250 28 10   251-280 30 12   281-310 32 14   311-340 34 16   341-370 36 18   371-400 38 20   401+ 40 22   If you get any low blood sugars after taking short-acting insulin, decrease all doses by 2 units going forward.  Do this every time you have a low within 4 hours of taking short-acting insulin.         - Resume your Metformin  mg in AM and 1000 mg at dinner.  Can go back to 1500 mg at dinner if that works best for you.   - Check blood sugars using griffin CGM or glucometer at least four times daily   - Keep a journal of your readings to bring into clinic                 Blood Glucose Checks: three times daily before meals, and at bedtime.    Follow up with your primary care doctor or endocrinology within 2 weeks, especially when your steroids stop (last dose of dexamethasone 6/1/23) as your insulin needs will likely go down.    If you have urgent questions or concerns regarding your blood sugars or insulin, you may contact [phone] (the main hospital ). Ask to speak with the endocrinologist on call.    Your target A1c value is less than 7%.     Thank you for letting the Diabetes Management Team be involved in your care!  JERRY Austin CNP

## 2023-05-19 NOTE — PLAN OF CARE
"Goal Outcome Evaluation:         VS: /78 (BP Location: Right arm)   Pulse 74   Temp 98.7  F (37.1  C) (Oral)   Resp 18   Ht 1.753 m (5' 9\")   Wt 93 kg (205 lb)   SpO2 95%   BMI 30.27 kg/m       O2: Room air    Output: Void without difficulty    Last BM: 5/17   Activity: Independent    Up for meals? Yes    Skin: Visible skin intact   Pain: Denies pain    CMS: A/O x4 - Denies chest pain, sob, n/t    Dressing: N/a   Diet: Moderate carb diet . Carb coverage with meal . Hourly blood sugar check due to insulin drip.    LDA: x1 PIV in R.hand  ( infusing IV insulin and 0.45 sodium @ 250 ml/hr ) and x 1 IV PIV  AC ( SL). x1 PIV SL   Equipment: Insulin pump , IV pole , personal items , call light with in reach    Plan: Plan of care ongoing    Additional Info:                       "

## 2023-05-19 NOTE — PROGRESS NOTES
Patient alert and oriented times four. Hourly blood sugar checks. On insulin drip and carb steady diet. Currently in Algorithm 2. Blood sugars fluctuated throughout shift between 98 and 282. Possible removal of insulin drip [id number]. Text Shannon Eldridge with questions regarding dosing. Ate 100% of breakfast and lunch. Independent in room. Possible discharge tomorrow, 5/20. Continent of bowel and bladder. Last BM 5/18. Calm and cooperative.     Patient's most recent vital signs are:     Vital signs:  BP: 122/85  Temp: 97.6  HR: 69  RR: 18  SpO2: 97 %     Patient does not have new respiratory symptoms.  Patient does not have new sore throat.  Patient does not have a fever greater than 99.5.

## 2023-05-19 NOTE — PROGRESS NOTES
Diabetes Consult Daily  Progress Note          Assessment/Plan:     HPI:  Connor Emerson is a very pleasant 43-year-old  male admitted on 5/17/2023.  Patient unfortunately carries a diagnosis of metastatic non-small cell lung carcinoma with brain metastases.  Patient takes PTA Decadron.  Patient is presenting to this facility today with hyperosmolar hyperglycemic state.  Patient was started on insulin drip in the emergency department.  Endocrinology will be consulted to manage transition to basal bolus insulin regimen.  Patient reports recent 15 pound unintended weight loss.  Patient reports tinnitus, periorbital edema, and anorexia.  Patient will be admitted to the hospitalist service for management of hyperosmolar hyperglycemic state.    Assessment:     1)  Type 2 Diabetes Mellitus; presented in DKA (elevated BG, bicarb 20, AG 17, ketones 1.8) with hx of variable control/med compliance.  A1c 8.3% - in context of anemia/complex illness  2)  Acute steroid exacerbated hyperglycemia   3)  Metastatic non-small cell lung carcinoma with brain metastases        Plan:       -  remains on NON-DKA IV insulin protocol. Was still on dextrose fluids, now stopped this AM and PO has been advanced.  Planning for transition today pending BG stablity.    -  Lantus 70 Unit(s) at 2000 and will CONTINUE IV INSULIN THRU UNTIL TOMORROW AM - will trend this afternoon and update dosing once more stability achieved.     -  WILL NEED REVIEW IN AM FOR AM DOSING FOR FINAL TRANSITION OFF - IV INSULIN NEEDS HAVE REMAINED SUBSTANTIAL     -  Novolog meal coverage 1 unit(s) per 6 g cho AC meals/snacks - increase to 1 per 4 g cho starting at lunch -> additional increase to 1:4 g cho Cover all intake in a timely fashion.     -  BG monitoring per IV q 1-2 hours     -  PTA meds : will resume  Metformin  mg this AM and 500 mg at 1800 hrs     -  Hypoglycemia protocol    -  Recommend carb counting protocol    -   Education :  Has done insulin in past - has barriers to regimen, needs a simplified plan so he remains compliant.     -  Outpatient follow up: prefers PCP for follow up     -  Please do not hesitate to contact the team with any questions/concerns.     -  Please notify inpatient diabetes service if changes are planned that will impact glycemia, such as NPO, starting/adjusting steroids, enteral feeding, parenteral feeding, dextrose fluids or procedures.      - Diabetes service will continue to follow.  Please do not hesitate to contact the team with any questions/concerns.      - Please notify inpatient diabetes service if changes are planned that will impact glycemia, such as NPO, starting/adjusting steroids, enteral feeding, parenteral feeding, dextrose fluids or procedures.     Recommendations for Discharge:   ** Instructions to patient were posted in AVS and discussed   Medications and supplies are to be ordered by primary service on discharge.   If there are problems or issues with ordering for discharge, you may contact the pharmacist directly for assistance     *please use the DIAB non-branded discharge supply order set ([id number])*     -blood glucose monitoring three times daily before meals and at bedtime  or resume your CGM - keep a journal of your readings to bring into clinic    -resume your Metformin  mg in AM and 1000 mg at dinner.  Can go back to 1500 mg at dinner if that works best for you.   -Glargine (Lantus, or Basaglar, per insurance coverage) ** units daily in the **  -Aspart (Novolog or Humalog, per insurance coverage): fixed dose ** units TID with meals  -Aspart (Novolog or Humalog, per insurance coverage): ** intensity sliding scale (see below)       -follow up with endocrinology provider closer to home in either [place] or [place]      -upon discharge, patient will need the following supplies: * confirms he does need all of the following supplies --> Lantus Solostar pens, Novolog  "Flexpens, \"BD\" (32G x 4mm) insulin pen needles, glucometer, lancets, and test strips (if brand of meter not known, can be ordered \"no brand specified\" and note to pharmacy: \"can substitute per insurance coverage\"), sharps container, alcohol swabs.       ** Test claim done for CGM and both Mohan and Dexcom are covered.  Would benefit patient to have a CGM**.       Plan discussed with patient, bedside RN/primary team via this note and vocera.          Interval History:     The last 24 hours progress and nursing notes reviewed.      Unfortunately, he was still getting dextrose D5% 0.45 at 250 ml/hr (12.5 g cho per hour) which is a significant impact.      Data from drip trends is NOT accurate and the Dex is tapering to 2 mg from 4 mg today.     Last potassium 4.0     Labs have now resulted - stable and appropriate for resumption of Metformin XR today  Will start at lower dosing 500 mg BID     BG trend:  Stable but requiring a very significant amount of insulin.   Appears (as noted above, has been getting the dextrose at 250 ml/hr) so the drip rates on IV insulin are inaccurate with the dextrose in background.     Will stop that now this AM as he is and has been eating well, no nausea.   Will calculate through this AM period with the dextrose fluids off, timely cho coverage and the new Dex 2 mg reduction. Goal is transition to start at 1400 as to not overlap with the lantus which was given yesterday.      Plan for discharge tomorrow.   Communicated with primary team.      0813 recommended the RN reduce the Algorithm to 2 for safety now that dextrose fluids stopped, patient care order sent.     1123 second recommendation for RN to reduce the Algorithm to 2 for safety, again patient care order sent followed by a phone call, acknowledged.      12:15 PM  Call from RN the patient BG low at 98 mg/dL - the Alg was never reduced per recommendations.  Orders now to reduce to Algorithm 1 with a BG re-check in 30 minutes at 1235.  "     2:04 PM  BG remain labile and no consistently on drip.  Will trend out futher until this afternoon to have best chance at a successful transition off.  Will review again at around 1700 and attempt transition.  Will update note at that time.     Addm:  5:03 PM  IV insulin rates continue to be very high and he is not appropriate for full transition off with IV insulin rates this high ranging from 0 - 11.5 unit(s) per hour.  Will increase cho ratio again and give 70 unit(s) of lantus at 2000 hrs with plan to fully transition in AM with BID lantus dosing once the additional insulin needs known following the trends overnight with the additional IV on top of the 70 unit(s) of lantus.       Planned Procedures/surgeries: none  D5W-containing solutions/medications: was on D5% 0.45 at 250 ml/hr - stopped at 0800 this AM 5/19.      Recent Labs   Lab 05/19/23  0647 05/19/23  0550 05/19/23  0449 05/19/23  0347 05/19/23  0245 05/19/23  0145   * 150* 134* 187* 186* 178*         Nutrition:     Orders Placed This Encounter      Combination Diet Regular Diet Adult; Moderate Consistent Carb (60 g CHO per Meal) Diet          PTA Regimen:     Medications:   Has not taken any medications for past month at least   Mohan CGM - not using   Novolog fixed dose w meals TID 2 unit(s) with meals - last dose at least 1 month ago  Lantus 10-20 unit(s) once daily - last dose at least 1 month ago   Novolog custom 1/20 >150 TID AC sliding scale insulin - not using   Metformin XR 1500 mg at supper - same, not using, last dose at least 1 month ago     BG monitoring frequency:  None past month or longer  BG trends at home: when checking and taking meds - BG would be 120-130's and that is why he would stop meds.              Review of Systems:   CC: denies          Medications:   Steroid planning:  Dex 4 mg -> to Dex 2 mg 5/19       Physical Exam:   /87 (BP Location: Right arm)   Pulse 92   Temp 99.1  F (37.3  C) (Oral)   Resp 18    "Ht 1.753 m (5' 9\")   Wt 93 kg (205 lb)   SpO2 97%   BMI 30.27 kg/m      General:   A&O, NAD, pleasant  HEENT:   Hearing WNL.   Lungs:  unremarkable, no new cough, no SOB  Psych:   Cooperative, appropriate mood, congruent affect          Data:     Lab Results   Component Value Date    A1C 8.3 04/06/2023    A1C 9.5 11/08/2022    A1C 12.3 06/24/2022    A1C 10.1 01/19/2022    A1C 11.9 05/12/2020    A1C 6.3 08/10/2018    A1C 6.5 11/27/2017    A1C 6.4 06/29/2017    A1C 6.5 02/13/2017        CBC RESULTS: Recent Labs   Lab Test 05/19/23  0846   WBC 14.4*   RBC 4.62   HGB 14.3   HCT 41.6   MCV 90   MCH 31.0   MCHC 34.4   RDW 12.3          Recent Labs   Lab Test 05/19/23  0647 05/19/23  0550 05/18/23  1312 05/18/23  1231 05/18/23  0704 05/18/23  0609 05/17/23  1618 05/17/23  1611 05/17/23  1428 05/17/23  1326   NA  --   --   --   --   --  135*  --   --   --  130*   POTASSIUM  --   --   --  4.0  --  3.2*   < >  --   --  4.3   CHLORIDE  --   --   --   --   --  99  --   --   --  88*   CO2  --   --   --   --   --  27  --   --   --  20*   ANIONGAP  --   --   --   --   --  9  --   --   --  22*   * 150*   < >  --    < > 154*   < >  --    < > 563*   BUN  --   --   --   --   --  14.3  --   --   --  28.6*   CR  --   --   --   --   --  0.55*  --  0.58*  --  0.79   TORY  --   --   --   --   --  8.1*  --   --   --  10.0    < > = values in this interval not displayed.     Liver Function Studies -   Recent Labs   Lab Test 05/18/23  0609   PROTTOTAL 4.9*   ALBUMIN 3.3*   BILITOTAL 0.7   ALKPHOS 58   AST 16   ALT 16     Lab Results   Component Value Date    INR 0.90 06/28/2022    INR 1.03 04/02/2022    INR 1.01 04/25/2015       JERRY Jimenez CNP, BC-ADM  Inpatient Diabetes Management Service  Pager - [phone]  Available on [employer]     To contact Endocrine Diabetes service:   From 7AM-5PM: page inpatient diabetes provider who is following the patient that day (see filed or incomplete progress notes/consult notes).  " If uncertain of provider assignment: page job code [phone]. (To page job code in-house dial 3 stars, 777 then enter number).  For questions or updates AFTER HOURS from 5PM-7AM: page the diabetes job code for on call fellow: [phone]    Please notify inpatient diabetes service if changes are planned to steroids, nutrition, or if procedures are planned requiring prolonged NPO status. Diabetes Management Team job code: [phone]    Follow up was done today via virtual/telephone encounter with RN and patient   Start of call 8:58  End of call 9:00    Start of call 9:01  End of call 9:09    Numerous vocera messages with RN re: this patient to manage this IV in an attempt to transition successfully off IV insulin this AM      I spent a total of >60 minutes on the date of the encounter doing prep/post-work, chart review, history and exam, documentation and further activities per the note including lab review, multidisciplinary communication, counseling the patient and/or coordinating care regarding acute hyper/hypoglycemic management, as well as discharge management and planning/communication.  See note for details.

## 2023-05-19 NOTE — CONSULTS
Consulted by Shannon Eldridge to run a test claim for CGM.    [facility] Per insurance, the following are covered and preferred under the patient's plans:       Freestyle Mohan 1/2/3 reader/sensors - $0    Dexcom G6/G7 transmitter//sensors - $0      Please feel free to contact me with any other test claims, prior authorizations, or insurance questions regarding outpatient medications.     Thanks!      Mariajose Rubio [facility] Discharge Pharmacy Liaison  Pronouns: She/Her/Hers    [facility]  [address] [place] [address]  [address] [place] [address] [address] [id number]   [email or web address]  [email or web address]   Phone: [phone]  Pager: [phone]  Fax: [phone]

## 2023-05-19 NOTE — PROGRESS NOTES
[facility]    Medicine Progress Note - Hospitalist Service, GOLD TEAM 21    Date of Admission:  5/17/2023    Assessment & Plan   Connor Emerson is a 43-year-old  male admitted on 5/17/2023. He has history of metastatic non-small cell lung carcinoma with brain metastases on a recent course of Decadron and presents with polyuria, polydipsia, and generally feeling well found to have BG in 600's and diagnosed with hyperosmolar hyperglycemic state. He was started on an insulin drip and admitted to hospital medicine.     #Insulin-requiring type 2 diabetes mellitus  #Hyperosmolar hyperglycemic state  Elevated BG likely due to recent steroid course.  - Endocrinology consulted, appreciate recommendations  - Continue insulin drip for now. Hopeful to transition to SubQ later today.   - Close monitoring of electrolytes   - Consistent carbohydrate diet    #Metastatic non-small cell lung carcinoma  #Brain metastases  #Tinnitus  #Periorbital edema  #Anorexia  Patient follows with Dr. Persaud from Oncology and Dr. Moran from Neurosurgery. Was seen by Dr. Moran on 4/28 and was started on Decadron for symptoms of headache and eye pressure. He was supposed to be taking 4 mg daily x 14 days, then 2 mg daily x 14 days to end 5/17; however in talking to the patient it seems he may have been taking 4 mg daily and never switched to 2 mg? He has also not been checking his BG during this time.  - Oncology consulted, appreciate recommendations  - Per Oncology, plan for 2 mg dexamethasone daily x 14 days and then stop  -- Oncology also recommends increasing brigatinib to 180 mg daily per Dr. Persaud's note on 4/24/23  - Nutrition consultation  - Continue PTA pain regimen     #Hypertension  - Continue PTA antihypertensive regimen  - Continue to closely monitor blood pressure        Diet: Combination Diet Regular Diet Adult; Moderate Consistent Carb (60 g CHO per Meal) Diet    DVT  "Prophylaxis: Enoxaparin (Lovenox) SQ  Heart Catheter: Not present  Lines: None     Cardiac Monitoring: None  Code Status: Full Code      Clinically Significant Risk Factors        # Hypokalemia: Lowest K = 3.2 mmol/L in last 2 days, will replace as needed   # Hypocalcemia: Lowest Ca = 8.1 mg/dL in last 2 days, will monitor and replace as appropriate     # Hypoalbuminemia: Lowest albumin = 3.3 g/dL at 5/18/2023  6:09 AM, will monitor as appropriate     # Hypertension: Noted on problem list       # DMII: A1C = 8.3 % (Ref range: 0.0 - 5.6 %) within past 6 months, PRESENT ON ADMISSION  # Obesity: Estimated body mass index is 30.27 kg/m  as calculated from the following:    Height as of this encounter: 1.753 m (5' 9\").    Weight as of this encounter: 93 kg (205 lb)., PRESENT ON ADMISSION          Disposition Plan     Expected Discharge Date: 05/19/2023      Destination: home with family            Paula Jacobo MD  Hospitalist Service, GOLD TEAM [address] [place] [address]  Securely message with [employer] (more info)  Text page via [facility] Paging/Directory   See signed in provider for up to date coverage information  ______________________________________________________________________    Interval History   Continues to feel better today. Eager to be off insulin drip. Eating and drinking well. No other questions or concerns.     Physical Exam   Vital Signs: Temp: 98.4  F (36.9  C) Temp src: Oral BP: (!) 149/89 Pulse: 72   Resp: 16 SpO2: 96 % O2 Device: None (Room air)    Weight: 205 lbs 0 oz    General: Awake, alert, no distress. Pleasant and conversant  Eyes: Sclera anicteric. No conjunctival injection. PERRLA.   Respiratory: Breathing comfortably in room air   Extremities: No lower extremity edema.   Skin: Warm, dry. No rash on visible skin.   Neuro: Alert, oriented to conversation and situation. Face symmetric, speech intact. Moves all extremities.   Psych: Normal affect.       Medical " Decision Making       45 MINUTES SPENT BY ME on the date of service doing chart review, history, exam, documentation & further activities per the note.      Data     I have personally reviewed the following data over the past 24 hrs:    14.4 (H)  \   14.3   / 185     136 101 10.9 /  286 (H)   5.1 28 0.53 (L) \       ALT: 18 AST: 16 AP: 59 TBILI: 0.6   ALB: 3.5 TOT PROTEIN: 5.1 (L) LIPASE: N/A       Imaging results reviewed over the past 24 hrs:   No results found for this or any previous visit (from the past 24 hour(s)).

## 2023-05-19 NOTE — PLAN OF CARE
Goal Outcome Evaluation:  [id number]     Patient is A&O x4. Call light within reach. Able to make needs known effectively. Independent in the room. Voids spontaneously without difficulty. LBM: 05/17.     RA. Moderate consistent Carb(60g) diet. Carb coverage with meal. PIV on L hand, SL, intact and patent. PIV on L AC, SL, intact and patent. PIV on R hand, infusing with continuous D5% & 0.45% NS at 250ml/hr and insulin drip 1unit/1ml ADULT IV infusion,  Algorithm 4. Denies pain, chest pain, SOB, n/v and n/t.      RN managed of K(4.0), Phos(2.5) replaced with Oral K & Na phosphates and Mg(1.9). AM lab draws placed.

## 2023-05-19 NOTE — CONSULTS
Care Management Initial Consult    General Information  Assessment completed with: Connor Mustafa  Type of CM/SW Visit: Initial Assessment    Primary Care Provider verified and updated as needed: No   Readmission within the last 30 days:        Reason for Consult: discharge planning  Advance Care Planning: Advance Care Planning Reviewed: no concerns identified          Communication Assessment  Patient's communication style: spoken language (English or Bilingual)    Hearing Difficulty or Deaf: no   Wear Glasses or Blind: no (contact lenses)    Cognitive  Cognitive/Neuro/Behavioral: WDL                      Living Environment:   People in home: child(elsa), dependent, child(elsa), adult, spouse  spouse Radha, special needs daughter  Current living Arrangements: apartment      Able to return to prior arrangements: yes       Family/Social Support:  Care provided by: self  Provides care for: no one, unable/limited ability to care for self  Marital Status:   Wife, Children          Description of Support System: Supportive, Involved    Support Assessment: Adequate family and caregiver support, Adequate social supports    Current Resources:   Patient receiving home care services: No     Community Resources:    Equipment currently used at home: grab bar, tub/shower, grab bar, toilet  Supplies currently used at home: Diabetic Supplies    Employment/Financial:  Employment Status: employed full-time        Financial Concerns:  (finances are tight. missing wages secondary to hospitalization)           Does the patient's insurance plan have a 3 day qualifying hospital stay waiver?  N/A    Lifestyle & Psychosocial Needs:  Social Determinants of Health     Tobacco Use: Low Risk  (5/17/2023)    Patient History      Smoking Tobacco Use: Never      Smokeless Tobacco Use: Never      Passive Exposure: Not on file   Alcohol Use: Not on file   Financial Resource Strain: Not on file   Food Insecurity: Not on file   Transportation  Needs: Not on file   Physical Activity: Not on file   Stress: Not on file   Social Connections: Not on file   Intimate Partner Violence: Not on file   Depression: Not at risk (4/6/2023)    PHQ-2      PHQ-2 Score: 1   Housing Stability: Not on file       Functional Status:  Prior to admission patient needed assistance:   Dependent ADLs:: Independent  Dependent IADLs:: Independent  Assesssment of Functional Status: At functional baseline    Mental Health Status:  Mental Health Status: Other (see comment) (anxiety)  Mental Health Management: Medication    Chemical Dependency Status:  Chemical Dependency Status: No Current Concerns             Values/Beliefs:  Spiritual, Cultural Beliefs, Episcopal Practices, Values that affect care: no               Additional Information:  Per Chart Review:  Connor Emerson is a 42 year old  male who is a non-smoker with PMHx of T2DM, HTN with metastatic NSCLC, who presented with HHS.     The pt had a h/o metastatic NSCLC diagnosed in 1/2022, f/up by Dr. Persaud at the [facility]. His has been on alectinib (stopped due to persistent arthralgias), then brigatinib since 2/22/2023. His last imagings prior to this admission are CT c/a/p (4/20) that showed no e/o progression with stable pulm nodules and MRI brain (4/20) that showed progression based on the increased size of the largest mets in the R frontal lobe and multiple new/enlarging mets throughout the cerebral/cerebellum hemispheres. Due to the Mri brain from 4/20 showing progression of his brain mets, his briganitib was increased to 180mg daily from 90mg. He was cont on dex 4mg daily. He was also seen by NSG on 4/28, who suggested to cont on dex taper over the next month and reimage in 6 wks, given that the R frontal lesion can be radiation necrosis.          This RNCC met with Connor at bedside to introduce role of RNCC. Complete initial assessment and discuss discharge planning. Connor states he was diagnosed with lung  "cancer in January 2022 when he was having difficulty breathing; he was admitted  at that time and received  diagnosis during that acute stay. Connor states the cancer metastased to his brain and last year he underwent gamma knife brain surgery and chemotherapy.  Connor states he felt \"terrible on chemo with muscle aches and pains all over his body\" He had made the decision to stop chemo in lieu of quality of life vs quantity, however, his oncologist reiterated that he had come so far that he hated to have him give up now. A referral was made to palliative to see if they could help with the pain. Palliative prescribed some new meds and he reports that the pain is much better managed now. Connor states he has four children, 3 sons 25, 22, 19, with 19 year old being in the [employer], he also has a 15 year old special needs daughter whom his wife provides PCA services for. Connor albarran he and his wife are having some difficulty, have grown apart and is lacking some emotional support in this area, feels it is the stress of his diagnosis and his anxiety. Connor states he is now on a med for his anxiety and feels that it is helping. Connor albarran he is still working full time and feels the need to discharge as soon as possible so he can go back to work. This RNCC encouraged Connor to take time for himself to recuperate. Connor states his wife will transport him home when discharge is imminent.    Alexandra JUSTICEN RN CCM  RN Care Coordinator 8A and 10 ICU  [address] [place] [address]  [email or web address]   Office: (10 ICU) [phone]   Pager: [phone]    For Weekend & Holiday on call RN Care Coordinator:  (Tasks: Home care, home infusion, medical equipment/oxygen, transportation, IMM & COREY forms, etc.)   [employer] ([phone]) Saturday & Sunday; ([phone])  Recognized Holidays  Pager #1: [phone] Units: 4A, 4C, 4E, 5A & 5B   Pager #2: [phone] Units: 6A, 6B, 6C, 6D  Pager " #3: [phone] Units: 7A, 7B, 7C, 7D & 5C   Pager #4: [phone] Units: 5 Ortho, 8A, 10 ICU, & [facility]      For Weekend & Holiday on call Social Work:  (Tasks: TCU, transportation, Hospice, adjustment to illness counseling, Health Care Directives, Child Protection and Domestic Violence concerns, Vulnerable Adult, IMM forms, etc.)   [place] (0800 [phone] 1630) Saturday and Sunday  Pager: [phone] Units: 4A, 4C, 4E, 5A and 5B   Pager: [phone] Units: 6A, 6B, 6C, 6D   Pager: [phone]: 7A, 7B, 7C, 7D, and 5C      [facility] ([phone]) Saturday and Sunday  Units: 5 Ortho, 8A, and 10 ICU   Pager: [phone]

## 2023-05-20 VITALS
OXYGEN SATURATION: 100 % | SYSTOLIC BLOOD PRESSURE: 142 MMHG | HEART RATE: 81 BPM | WEIGHT: 205 LBS | BODY MASS INDEX: 30.36 KG/M2 | RESPIRATION RATE: 17 BRPM | HEIGHT: 69 IN | TEMPERATURE: 97.6 F | DIASTOLIC BLOOD PRESSURE: 87 MMHG

## 2023-05-20 LAB
ALBUMIN SERPL BCG-MCNC: 3.8 G/DL (ref 3.5–5.2)
ALP SERPL-CCNC: 60 U/L (ref 40–129)
ALT SERPL W P-5'-P-CCNC: 20 U/L (ref 10–50)
ANION GAP SERPL CALCULATED.3IONS-SCNC: 8 MMOL/L (ref 7–15)
AST SERPL W P-5'-P-CCNC: 15 U/L (ref 10–50)
BASOPHILS # BLD AUTO: 0 10E3/UL (ref 0–0.2)
BASOPHILS NFR BLD AUTO: 0 %
BILIRUB SERPL-MCNC: 0.5 MG/DL
BUN SERPL-MCNC: 14 MG/DL (ref 6–20)
CALCIUM SERPL-MCNC: 9.1 MG/DL (ref 8.6–10)
CHLORIDE SERPL-SCNC: 105 MMOL/L (ref 98–107)
CREAT SERPL-MCNC: 0.55 MG/DL (ref 0.67–1.17)
DEPRECATED HCO3 PLAS-SCNC: 29 MMOL/L (ref 22–29)
EOSINOPHIL # BLD AUTO: 0.3 10E3/UL (ref 0–0.7)
EOSINOPHIL NFR BLD AUTO: 2 %
ERYTHROCYTE [DISTWIDTH] IN BLOOD BY AUTOMATED COUNT: 12.7 % (ref 10–15)
GFR SERPL CREATININE-BSD FRML MDRD: >90 ML/MIN/1.73M2
GLUCOSE BLDC GLUCOMTR-MCNC: 104 MG/DL (ref 70–99)
GLUCOSE BLDC GLUCOMTR-MCNC: 105 MG/DL (ref 70–99)
GLUCOSE BLDC GLUCOMTR-MCNC: 115 MG/DL (ref 70–99)
GLUCOSE BLDC GLUCOMTR-MCNC: 125 MG/DL (ref 70–99)
GLUCOSE BLDC GLUCOMTR-MCNC: 139 MG/DL (ref 70–99)
GLUCOSE BLDC GLUCOMTR-MCNC: 140 MG/DL (ref 70–99)
GLUCOSE BLDC GLUCOMTR-MCNC: 143 MG/DL (ref 70–99)
GLUCOSE BLDC GLUCOMTR-MCNC: 143 MG/DL (ref 70–99)
GLUCOSE BLDC GLUCOMTR-MCNC: 147 MG/DL (ref 70–99)
GLUCOSE BLDC GLUCOMTR-MCNC: 154 MG/DL (ref 70–99)
GLUCOSE BLDC GLUCOMTR-MCNC: 161 MG/DL (ref 70–99)
GLUCOSE BLDC GLUCOMTR-MCNC: 161 MG/DL (ref 70–99)
GLUCOSE BLDC GLUCOMTR-MCNC: 165 MG/DL (ref 70–99)
GLUCOSE BLDC GLUCOMTR-MCNC: 175 MG/DL (ref 70–99)
GLUCOSE SERPL-MCNC: 93 MG/DL (ref 70–99)
HCT VFR BLD AUTO: 44.1 % (ref 40–53)
HGB BLD-MCNC: 15.1 G/DL (ref 13.3–17.7)
IMM GRANULOCYTES # BLD: 0.1 10E3/UL
IMM GRANULOCYTES NFR BLD: 1 %
LYMPHOCYTES # BLD AUTO: 4 10E3/UL (ref 0.8–5.3)
LYMPHOCYTES NFR BLD AUTO: 32 %
MAGNESIUM SERPL-MCNC: 2.4 MG/DL (ref 1.7–2.3)
MCH RBC QN AUTO: 31.3 PG (ref 26.5–33)
MCHC RBC AUTO-ENTMCNC: 34.2 G/DL (ref 31.5–36.5)
MCV RBC AUTO: 92 FL (ref 78–100)
MONOCYTES # BLD AUTO: 0.5 10E3/UL (ref 0–1.3)
MONOCYTES NFR BLD AUTO: 4 %
NEUTROPHILS # BLD AUTO: 7.7 10E3/UL (ref 1.6–8.3)
NEUTROPHILS NFR BLD AUTO: 61 %
NRBC # BLD AUTO: 0 10E3/UL
NRBC BLD AUTO-RTO: 0 /100
PHOSPHATE SERPL-MCNC: 3.2 MG/DL (ref 2.5–4.5)
PHOSPHATE SERPL-MCNC: 3.5 MG/DL (ref 2.5–4.5)
PLATELET # BLD AUTO: 196 10E3/UL (ref 150–450)
POTASSIUM SERPL-SCNC: 3.9 MMOL/L (ref 3.4–5.3)
PROT SERPL-MCNC: 5.4 G/DL (ref 6.4–8.3)
RBC # BLD AUTO: 4.82 10E6/UL (ref 4.4–5.9)
SODIUM SERPL-SCNC: 142 MMOL/L (ref 136–145)
WBC # BLD AUTO: 12.5 10E3/UL (ref 4–11)

## 2023-05-20 PROCEDURE — 250N000012 HC RX MED GY IP 250 OP 636 PS 637: Performed by: STUDENT IN AN ORGANIZED HEALTH CARE EDUCATION/TRAINING PROGRAM

## 2023-05-20 PROCEDURE — 250N000013 HC RX MED GY IP 250 OP 250 PS 637: Performed by: INTERNAL MEDICINE

## 2023-05-20 PROCEDURE — 83735 ASSAY OF MAGNESIUM: CPT | Performed by: INTERNAL MEDICINE

## 2023-05-20 PROCEDURE — 85025 COMPLETE CBC W/AUTO DIFF WBC: CPT | Performed by: INTERNAL MEDICINE

## 2023-05-20 PROCEDURE — 99233 SBSQ HOSP IP/OBS HIGH 50: CPT | Performed by: INTERNAL MEDICINE

## 2023-05-20 PROCEDURE — 36415 COLL VENOUS BLD VENIPUNCTURE: CPT | Performed by: INTERNAL MEDICINE

## 2023-05-20 PROCEDURE — 80053 COMPREHEN METABOLIC PANEL: CPT | Performed by: INTERNAL MEDICINE

## 2023-05-20 PROCEDURE — 84100 ASSAY OF PHOSPHORUS: CPT | Performed by: INTERNAL MEDICINE

## 2023-05-20 PROCEDURE — 99239 HOSP IP/OBS DSCHRG MGMT >30: CPT | Performed by: STUDENT IN AN ORGANIZED HEALTH CARE EDUCATION/TRAINING PROGRAM

## 2023-05-20 PROCEDURE — 250N000013 HC RX MED GY IP 250 OP 250 PS 637: Performed by: NURSE PRACTITIONER

## 2023-05-20 RX ORDER — CONTAINER,EMPTY
EACH MISCELLANEOUS
Qty: 1 EACH | Refills: 0 | Status: SHIPPED | OUTPATIENT
Start: 2023-05-20 | End: 2023-12-27

## 2023-05-20 RX ORDER — METFORMIN HCL 500 MG
1000 TABLET, EXTENDED RELEASE 24 HR ORAL
COMMUNITY
Start: 2023-05-20 | End: 2023-06-13

## 2023-05-20 RX ORDER — METFORMIN HCL 500 MG
500 TABLET, EXTENDED RELEASE 24 HR ORAL
Qty: 270 TABLET | Refills: 1 | COMMUNITY
Start: 2023-05-20 | End: 2023-08-16

## 2023-05-20 RX ORDER — DEXAMETHASONE 2 MG/1
2 TABLET ORAL
Qty: 12 TABLET | Refills: 0 | Status: SHIPPED | OUTPATIENT
Start: 2023-05-21 | End: 2023-06-23

## 2023-05-20 RX ORDER — BLOOD PRESSURE TEST KIT
KIT MISCELLANEOUS
Qty: 100 EACH | Refills: 0 | Status: SHIPPED | OUTPATIENT
Start: 2023-05-20

## 2023-05-20 RX ADMIN — DEXAMETHASONE 2 MG: 2 TABLET ORAL at 08:31

## 2023-05-20 RX ADMIN — POTASSIUM & SODIUM PHOSPHATES POWDER PACK 280-160-250 MG 1 PACKET: 280-160-250 PACK at 03:00

## 2023-05-20 RX ADMIN — POTASSIUM & SODIUM PHOSPHATES POWDER PACK 280-160-250 MG 1 PACKET: 280-160-250 PACK at 00:08

## 2023-05-20 RX ADMIN — CELECOXIB 100 MG: 100 CAPSULE ORAL at 08:31

## 2023-05-20 RX ADMIN — LISINOPRIL 40 MG: 40 TABLET ORAL at 08:31

## 2023-05-20 RX ADMIN — THERA TABS 1 TABLET: TAB at 08:31

## 2023-05-20 RX ADMIN — THIAMINE HCL TAB 100 MG 100 MG: 100 TAB at 08:31

## 2023-05-20 RX ADMIN — HYDROCHLOROTHIAZIDE 25 MG: 25 TABLET ORAL at 08:31

## 2023-05-20 RX ADMIN — POTASSIUM & SODIUM PHOSPHATES POWDER PACK 280-160-250 MG 1 PACKET: 280-160-250 PACK at 07:10

## 2023-05-20 RX ADMIN — PROPRANOLOL HYDROCHLORIDE 20 MG: 10 TABLET ORAL at 08:31

## 2023-05-20 RX ADMIN — Medication 2.5 MG: at 08:31

## 2023-05-20 RX ADMIN — METFORMIN HYDROCHLORIDE 500 MG: 500 TABLET, EXTENDED RELEASE ORAL at 08:31

## 2023-05-20 ASSESSMENT — ACTIVITIES OF DAILY LIVING (ADL)
ADLS_ACUITY_SCORE: 18

## 2023-05-20 NOTE — DISCHARGE SUMMARY
"[facility]  Hospitalist Discharge Summary      Date of Admission:  5/17/2023  Date of Discharge:  5/20/2023  Discharging Provider: Paula Jacobo MD  Discharge Service: Hospitalist Service, GOLD TEAM 21    Discharge Diagnoses   #Insulin-requiring type 2 diabetes mellitus  #Hyperosmolar hyperglycemic state  #Metastatic non-small cell lung carcinoma  #Brain metastases  #Tinnitus  #Periorbital edema  #Anorexia  #Hypertension    Clinically Significant Risk Factors     # DMII: A1C = 8.3 % (Ref range: 0.0 - 5.6 %) within past 6 months  # Obesity: Estimated body mass index is 30.27 kg/m  as calculated from the following:    Height as of this encounter: 1.753 m (5' 9\").    Weight as of this encounter: 93 kg (205 lb).       Follow-ups Needed After Discharge   Follow-up Appointments     Adult [facility] Follow-up and recommended labs and tests      It is important that you see your primary care doctor for follow up. Your   blood sugar will likely decrease once you stop taking dexamethasone (last   dose on June 1st). Follow up with primary care provider, Radha Shetty, within 7-10 days for hospital follow- up and blood sugar check   with medication adjustment. Please bring your blood sugar journal with   you.     Appointments on [place] and/or [facility] (with [facility] or [facility]   provider or service). Call [phone] if you haven't heard regarding   these appointments within 7 days of discharge.             Unresulted Labs Ordered in the Past 30 Days of this Admission     Date and Time Order Name Status Description    5/17/2023  1:22 PM Blood Culture Peripheral Blood Preliminary     5/17/2023  1:22 PM Blood Culture Hand, Right Preliminary       These results will be followed up by Hospitalist.     Discharge Disposition   Discharged to home  Condition at discharge: Good    Hospital Course   Connor Emerson is a 43-year-old male admitted on 5/17/2023. He has history of " metastatic non-small cell lung carcinoma with brain metastases on a recent course of Decadron and presents with polyuria, polydipsia, and generally feeling unwell found to have BG in 600's and diagnosed with hyperosmolar hyperglycemic state. He was started on an insulin drip and admitted to hospital medicine.     #Insulin-requiring type 2 diabetes mellitus  #Hyperosmolar hyperglycemic state  Elevated BG likely due to recent steroid course.  - Endocrinology consulted, appreciate recommendations  - Was titrated off insulin drip and transitioned to SubQ regimen:   - Glargine 70 units at bedtime and instruct patient to decrease dose by 5 units for any overnight hypoglycemia   -please provide the following scale to patient in discharge paperwork for his mealtime dosing:     Pre-Meal Blood Sugar Mealtime insulin dose (while on 2mg dexamethasone) Correction for high blood sugar dose while on 2mg dexamethasone (no meal)   <70 Treat low blood sugar Treat low blood sugar    18 0   101-130 20 2   131-160 22 4   161-190 24 6   191-220 26 8   221-250 28 10   251-280 30 12   281-310 32 14   311-340 34 16   341-370 36 18   371-400 38 20   401+ 40 22   If you get any low blood sugars after taking short-acting insulin, decrease all doses by 2 units going forward.  Do this every time you have a low within 4 hours of taking short-acting insulin.         -Can resume home metformin   -check blood sugars using griffin CGM or glucometer at least QID  -should have close followup especially at the time of any steroid dose changes with primary care or endocrine    #Metastatic non-small cell lung carcinoma  #Brain metastases  #Tinnitus  #Periorbital edema  #Anorexia  Patient follows with Dr. Persaud from Oncology and Dr. Moran from Neurosurgery. Was seen by Dr. Moran on 4/28 and was started on Decadron for symptoms of headache and eye pressure. He was supposed to be taking 4 mg daily x 14 days, then 2 mg daily x 14 days to end 5/17;  however in talking to the patient it seems he may have been taking 4 mg daily and never switched to 2 mg? He has also not been checking his BG during this time.  - Oncology consulted, appreciate recommendations  - Per Oncology, plan for 2 mg dexamethasone daily x 14 days (last dose 6/1/23) and then stop  -- Oncology also recommends increasing brigatinib to 180 mg daily per Dr. Persaud's note on 4/24/23  - Nutrition consultation  - Continue PTA pain regimen     #Hypertension  - Continue PTA antihypertensive regimen       Consultations This Hospital Stay   NUTRITION SERVICES ADULT IP CONSULT  ONCOLOGY ADULT IP CONSULT  ENDOCRINE DIABETES ADULT IP CONSULT  PHARMACY LIAISON FOR MEDICATION COVERAGE CONSULT  CARE MANAGEMENT / SOCIAL WORK IP CONSULT    Code Status   Full Code    Time Spent on this Encounter   I, Paula Jacobo MD, personally saw the patient today and spent greater than 30 minutes discharging this patient.       Paula Jacobo MD  [facility] MED SURG  [facility] [facility] [address]  Phone: [phone]  Fax: [phone]  ______________________________________________________________________    Physical Exam   Vital Signs: Temp: 97.6  F (36.4  C) Temp src: Oral BP: (!) 142/87 Pulse: 81   Resp: 17 SpO2: 100 % O2 Device: None (Room air)    Weight: 205 lbs 0 oz    General: Awake, alert, no distress. Pleasant and conversant  Eyes: Sclera anicteric. No conjunctival injection.   Neck: Supple, full ROM, no adenopathy.  CV: Normal rate and rhythm, normal S1 and S2. No murmurs, rubs, gallops.  Respiratory: Lungs clear to auscultation bilaterally. No wheezing, rhonchi, or rales.  Abdomen: Soft, non-distended. Non-tender to palpation. No rebound tenderness or guarding. +BS.   MSK: No joint redness, deformity or swelling.   Extremities: No lower extremity edema.   Skin: Warm, dry. No rash on visible skin.   Neuro: Alert, oriented to conversation and situation. Face symmetric, speech  intact. Moves all extremities.   Psych: Normal affect.          Primary Care Physician   Radha Shetty    Discharge Orders      Reason for your hospital stay    High blood sugar     Activity    Your activity upon discharge: activity as tolerated     Adult [facility] Follow-up and recommended labs and tests    It is important that you see your primary care doctor for follow up. Your blood sugar will likely decrease once you stop taking dexamethasone (last dose on June 1st). Follow up with primary care provider, Radha Shetty, within 7-10 days for hospital follow- up and blood sugar check with medication adjustment. Please bring your blood sugar journal with you.     Appointments on [place] and/or [facility] (with [facility] or [facility] provider or service). Call [phone] if you haven't heard regarding these appointments within 7 days of discharge.     Diet    Follow this diet upon discharge: Regular       Significant Results and Procedures   Most Recent 3 CBC's:Recent Labs   Lab Test 05/20/23  0840 05/19/23  0846 05/18/23  0609   WBC 12.5* 14.4* 10.8   HGB 15.1 14.3 13.7   MCV 92 90 90    185 192     Most Recent 3 BMP's:Recent Labs   Lab Test 05/20/23  1241 05/20/23  1209 05/20/23  1108 05/20/23  0602 05/20/23  0601 05/19/23  0900 05/19/23  0846 05/18/23  1312 05/18/23  1231 05/18/23  0704 05/18/23  0609   NA  --   --   --   --  142  --  136  --   --   --  135*   POTASSIUM  --   --   --   --  3.9  --  5.1  --  4.0  --  3.2*   CHLORIDE  --   --   --   --  105  --  101  --   --   --  99   CO2  --   --   --   --  29  --  28  --   --   --  27   BUN  --   --   --   --  14.0  --  10.9  --   --   --  14.3   CR  --   --   --   --  0.55*  --  0.53*  --   --   --  0.55*   ANIONGAP  --   --   --   --  8  --  7  --   --   --  9   TORY  --   --   --   --  9.1  --  9.0  --   --   --  8.1*   * 143* 143*   < > 93   < > 212*   < >  --    < > 154*    < > = values in this interval not displayed.     Most Recent 2  LFT's:Recent Labs   Lab Test 05/20/23  0601 05/19/23  0846   AST 15 16   ALT 20 18   ALKPHOS 60 59   BILITOTAL 0.5 0.6   ,   Results for orders placed or performed during the hospital encounter of 05/17/23   Head CT w/o contrast    Narrative    CT OF THE HEAD WITHOUT CONTRAST  5/17/2023 2:04 PM     COMPARISON: Brain MRI 4/20/2023.    HISTORY:  Known metastasis, headache.    TECHNIQUE: Axial CT images of the head from the skull base to the  vertex were acquired without IV contrast.    FINDINGS: Known metastatic nodule in the inferior aspect of the right  frontal lobe with surrounding vasogenic edema again noted. Presumed  metastatic nodule in the superior aspect of the left cerebral  hemisphere again noted. Postoperative change consisting of a left  frontal craniotomy and biopsy site in the left frontal lobe again  noted. Gray-white differentiation of the brain is otherwise normal.    The ventricles and basal cisterns are within normal limits in  configuration. There is no midline shift. There are no extra-axial  fluid collections.    No intracranial hemorrhage or recent infarct.    The visualized paranasal sinuses are well-aerated. There is no  mastoiditis. There are no fractures of the visualized bones.       Impression    IMPRESSION:  1. Stable head CT demonstrating presumed metastatic nodules in the  inferior right frontal lobe and superior aspect of the left cerebellar  hemisphere as well as postoperative changes to the high left frontal  lobe.  2. No evidence for acute intracranial pathology.      Radiation dose for this scan was reduced using automated exposure  control, adjustment of the mA and/or kV according to patient size, or  iterative reconstruction technique.    DENIZ ABDULLAHI MD         SYSTEM ID:  [id number]   XR Chest 1 View    Narrative    CHEST ONE VIEW  5/17/2023 2:10 PM     HISTORY:  Dyspnea.    COMPARISON: 2/9/2023.      Impression    IMPRESSION: Negative chest. Lungs clear.    RINA GIRALDO,  MD         SYSTEM ID:  [id number]       Discharge Medications   Current Discharge Medication List      START taking these medications    Details   !! Alcohol Swabs PADS Use to swab the area of the injection or jabier as directed. Per insurance coverage  Qty: 100 each, Refills: 0    Associated Diagnoses: Type 2 diabetes mellitus with hyperglycemia, without long-term current use of insulin (H)      blood glucose (NO BRAND SPECIFIED) lancets standard To use to test glucose level in the blood Use to test blood sugar  4  times daily as directed. To accompany glucose monitor brands per insurance coverage.  Qty: 100 each, Refills: 3    Associated Diagnoses: Type 2 diabetes mellitus with hyperglycemia, without long-term current use of insulin (H)      blood glucose (NO BRAND SPECIFIED) test strip To use to test glucose level in the blood Use to test blood sugar  4 times daily as directed. To accompany glucose monitor brands per insurance coverage.  Qty: 100 strip, Refills: 3    Associated Diagnoses: Type 2 diabetes mellitus with hyperglycemia, without long-term current use of insulin (H)      !! blood glucose monitoring (NO BRAND SPECIFIED) meter device kit Use as directed. Per insurance coverage  Qty: 1 kit, Refills: 0    Associated Diagnoses: Type 2 diabetes mellitus with hyperglycemia, without long-term current use of insulin (H)      !! insulin pen needle (32G X 4 MM) 32G X 4 MM miscellaneous Use as directed by provider. Per insurance coverage  Qty: 100 each, Refills: 0    Associated Diagnoses: Type 2 diabetes mellitus with hyperglycemia, without long-term current use of insulin (H)      Sharps Container MISC Use as directed to dispose of needles, lancets and other sharps  Qty: 1 each, Refills: 0    Associated Diagnoses: Type 2 diabetes mellitus with hyperglycemia, without long-term current use of insulin (H)       !! - Potential duplicate medications found. Please discuss with provider.      CONTINUE these medications  which have CHANGED    Details   brigatinib (ALUNBRIG) 30 MG TABS tablet Take 6 tablets (180 mg) by mouth daily  Qty: 90 tablet, Refills: 0    Associated Diagnoses: Malignant neoplasm of lower lobe of right lung (H); Radiation therapy induced brain necrosis      dexamethasone (DECADRON) 2 MG tablet Take 1 tablet (2 mg) by mouth daily (with breakfast) for 12 doses  Qty: 12 tablet, Refills: 0    Associated Diagnoses: Malignant neoplasm metastatic to brain (H)      insulin aspart (NOVOLOG PEN) 100 UNIT/ML pen Inject 0-40 Units Subcutaneous 3 times daily (before meals) Per discharge instructions sliding scale  Qty: 45 mL, Refills: 2    Associated Diagnoses: Type 2 diabetes mellitus with hyperglycemia, with long-term current use of insulin (H)      insulin glargine (LANTUS PEN) 100 UNIT/ML pen Inject 70 Units Subcutaneous At Bedtime  Qty: 30 mL, Refills: 2    Comments: If Lantus is not covered by insurance, may substitute Basaglar or Semglee or other insulin glargine product per insurance preference at same dose and frequency.    Associated Diagnoses: Type 2 diabetes mellitus with hyperglycemia, with long-term current use of insulin (H)      !! metFORMIN (GLUCOPHAGE XR) 500 MG 24 hr tablet Take 1 tablet (500 mg) by mouth daily (with breakfast)  Qty: 270 tablet, Refills: 1    Associated Diagnoses: Type 2 diabetes mellitus with hyperglycemia, with long-term current use of insulin (H)       !! - Potential duplicate medications found. Please discuss with provider.      CONTINUE these medications which have NOT CHANGED    Details   albuterol (PROAIR HFA/PROVENTIL HFA/VENTOLIN HFA) 108 (90 Base) MCG/ACT inhaler Inhale 2 puffs into the lungs every 6 hours as needed for shortness of breath, wheezing or cough  Qty: 18 g, Refills: 0    Comments: Pharmacy may dispense brand covered by insurance (Proair, or proventil or ventolin or generic albuterol inhaler)  Associated Diagnoses: Non-small cell lung cancer, unspecified laterality  (H)      celecoxib (CELEBREX) 100 MG capsule Take 1 capsule (100 mg) by mouth 2 times daily  Qty: 60 capsule, Refills: 4    Comments: This is for cancer associated pain  Associated Diagnoses: Cancer associated pain      citalopram (CELEXA) 20 MG tablet Take 1 tablet (20 mg) by mouth every evening  Qty: 90 tablet, Refills: 1    Associated Diagnoses: Adjustment disorder with mixed anxiety and depressed mood      hydrochlorothiazide (HYDRODIURIL) 25 MG tablet Take 1 tablet (25 mg) by mouth daily  Qty: 90 tablet, Refills: 1    Associated Diagnoses: Primary hypertension      lisinopril (ZESTRIL) 40 MG tablet Take 1 tablet (40 mg) by mouth daily  Qty: 90 tablet, Refills: 1      !! metFORMIN (GLUCOPHAGE XR) 500 MG 24 hr tablet Take 2 tablets (1,000 mg) by mouth daily (with dinner)      methadone (DOLOPHINE) 5 MG tablet Take 0.5 tablets (2.5 mg) by mouth every morning AND 1 tablet (5 mg) At Bedtime.  Qty: 45 tablet, Refills: 0    Associated Diagnoses: Cancer associated pain      naloxone (NARCAN) 4 MG/0.1ML nasal spray Spray 1 spray (4 mg) into one nostril alternating nostrils as needed for opioid reversal every 2-3 minutes until assistance arrives  Qty: 0.2 mL, Refills: 3    Associated Diagnoses: Cancer associated pain      oxyCODONE (ROXICODONE) 5 MG tablet Take 1-2 tablets (5-10 mg) by mouth every 4 hours as needed for moderate to severe pain  Qty: 60 tablet, Refills: 0    Associated Diagnoses: Cancer associated pain      propranolol (INDERAL) 20 MG tablet Take 1 tablet (20 mg) by mouth 2 times daily  Qty: 180 tablet, Refills: 1    Associated Diagnoses: Nonintractable episodic headache, unspecified headache type      zolpidem (AMBIEN) 5 MG tablet Take 1 tablet (5 mg) by mouth nightly as needed for sleep  Qty: 20 tablet, Refills: 0    Associated Diagnoses: Insomnia      !! alcohol swab prep pads Use to swab area of injection/jabier as directed.  Qty: 100 each, Refills: 3    Associated Diagnoses: Type 2 diabetes mellitus  with hyperglycemia, with long-term current use of insulin (H)      !! blood glucose monitoring (NO BRAND SPECIFIED) meter device kit Use to test blood sugar 2 times daily or as directed. Preferred blood glucose meter OR supplies to accompany: Blood Glucose Monitor Brands: per insurance.  Qty: 1 kit, Refills: 0    Associated Diagnoses: Type 2 diabetes mellitus with hyperglycemia, with long-term current use of insulin (H)      Continuous Blood Gluc Sensor (FREESTYLE IRENE 2 SENSOR) MISC 1 each every 14 days Apply as directed per  instructions  Qty: 2 each, Refills: 11    Associated Diagnoses: Type 2 diabetes mellitus without complication, with long-term current use of insulin (H)      !! insulin pen needle (31G X 8 MM) 31G X 8 MM miscellaneous Use one pen needles daily or as directed.  Qty: 100 each, Refills: 0    Associated Diagnoses: Type 2 diabetes mellitus without complication (H)       !! - Potential duplicate medications found. Please discuss with provider.      STOP taking these medications       escitalopram (LEXAPRO) 10 MG tablet Comments:   Reason for Stopping:             Allergies   Allergies   Allergen Reactions     Vicodin [Hydrocodone-Acetaminophen] Nausea and Vomiting and GI Disturbance

## 2023-05-20 NOTE — PROGRESS NOTES
[id number]    Patient is alert and oriented x 4, calm, and able to make needs known effectively. Independent in room with transfers and cares. Continent of bowel and bladder, denies any concerns. Regular consistent carbohydrate diet. Appetite good. Carb coverage insulin provided with dinner.     Continuous insulin drip infusion running with NS at 10 ml/hr. Restarted algorithm 1 with insulin at 0.5 ml/hr at 2000 after lantus insulin. PIV intact and patent.     VSS. Denies any new concerns. Continue with plan of care.

## 2023-05-20 NOTE — PROGRESS NOTES
"  VS: BP (!) 142/87 (BP Location: Left arm, Patient Position: Semi-Richardson's, Cuff Size: Adult Regular)   Pulse 81   Temp 97.6  F (36.4  C) (Oral)   Resp 17   Ht 1.753 m (5' 9\")   Wt 93 kg (205 lb)   SpO2 100%   BMI 30.27 kg/m  '   O2: On RA   Output: Continent of bowel and bladder.    Last BM: 05/20. Bowel sounds active in all quadrants.    Activity: Independent    Skin: Skin WDL   Pain: Denies pain.    CMS: WDL    Dressing: N/A   Diet: Moderate Carb Diet   LDA: IV taken out and Cont. Infusion discontinued at 1240.    Equipment: Pt belongings within reach.    Plan: Discharged home this afternoon.    Additional Info:         "

## 2023-05-20 NOTE — PROGRESS NOTES
Diabetes Consult Daily  Progress Note          Assessment/Plan:     HPI:  Connor Emerson is a very pleasant 43-year-old  male admitted on 5/17/2023.  Patient unfortunately carries a diagnosis of metastatic non-small cell lung carcinoma with brain metastases.  Patient takes PTA Decadron.  Patient is presenting to this facility today with hyperosmolar hyperglycemic state.  Patient was started on insulin drip in the emergency department.  Endocrinology will be consulted to manage transition to basal bolus insulin regimen.  Patient reports recent 15 pound unintended weight loss.  Patient reports tinnitus, periorbital edema, and anorexia.  Patient will be admitted to the hospitalist service for management of hyperosmolar hyperglycemic state.    Assessment:     1)  Type 2 Diabetes Mellitus; presented in DKA (elevated BG, bicarb 20, AG 17, ketones 1.8) with hx of variable control/med compliance.  A1c 8.3% - in context of anemia/complex illness  2)  Acute steroid exacerbated hyperglycemia   3)  Metastatic non-small cell lung carcinoma with brain metastases  4) Day of discharge requiring 70 of lantus + 0.5-1u/hr insulin gtt + 16 units with breakfast appear to be keeping BG within 100s at this time on 2mg dexamethasone  5) would discharge using an estimated total daily dose of ~130 units while on this dose of dexamethasone.  Note that doses will likely change after dexamethasone dose changes so he should have close followup        Plan:     Recommended discharge regimen at this time:  -glargine 70 units at bedtime and instruct patient to decrease dose by 5 units for any overnight hypoglycemia  -please provide the following scale to patient in discharge paperwork for his mealtime dosing:    Pre-Meal Blood Sugar Mealtime insulin dose (while on 2mg dexamethasone) Correction for high blood sugar dose while on 2mg dexamethasone (no meal)   <70 Treat low blood sugar Treat low blood sugar     18 0   101-130 20 2   131-160 22 4   161-190 24 6   191-220 26 8   221-250 28 10   251-280 30 12   281-310 32 14   311-340 34 16   341-370 36 18   371-400 38 20   401+ 40 22   If you get any low blood sugars after taking short-acting insulin, decrease all doses by 2 units going forward.  Do this every time you have a low within 4 hours of taking short-acting insulin.       -Can continue/resume home metformin   -check blood sugars using griffin CGM or glucometer at least QID  -should have close followup especially at the time of any steroid dose changes with primary care or endocrine    Plan discussed with patient and primary team    CBC RESULTS: Recent Labs   Lab Test 05/19/23  0846   WBC 14.4*   RBC 4.62   HGB 14.3   HCT 41.6   MCV 90   MCH 31.0   MCHC 34.4   RDW 12.3          Recent Labs   Lab Test 05/19/23  0647 05/19/23  0550 05/18/23  1312 05/18/23  1231 05/18/23  0704 05/18/23  0609 05/17/23  1618 05/17/23  1611 05/17/23  1428 05/17/23  1326   NA  --   --   --   --   --  135*  --   --   --  130*   POTASSIUM  --   --   --  4.0  --  3.2*   < >  --   --  4.3   CHLORIDE  --   --   --   --   --  99  --   --   --  88*   CO2  --   --   --   --   --  27  --   --   --  20*   ANIONGAP  --   --   --   --   --  9  --   --   --  22*   * 150*   < >  --    < > 154*   < >  --    < > 563*   BUN  --   --   --   --   --  14.3  --   --   --  28.6*   CR  --   --   --   --   --  0.55*  --  0.58*  --  0.79   TORY  --   --   --   --   --  8.1*  --   --   --  10.0    < > = values in this interval not displayed.     Liver Function Studies -   Recent Labs   Lab Test 05/18/23  0609   PROTTOTAL 4.9*   ALBUMIN 3.3*   BILITOTAL 0.7   ALKPHOS 58   AST 16   ALT 16     Lab Results   Component Value Date    INR 0.90 06/28/2022    INR 1.03 04/02/2022    INR 1.01 04/25/2015     Physical exam:  -vitals reviewed in epic  General: well appearing, sitting in chair.    HEENT: normocephalic, atraumatic. Oral mucous membranes moist.    Lungs: unlabored respiration, no cough  ABD: rounded, nondistended  Skin: warm and dry, no obvious lesions  MSK:  moves all extremities  Mental status:  alert, oriented  Psych:   mood and affect appear appropriate       Shiv Lee MD  Inpatient Diabetes Service  Pager   [phone]  Date of service: 5/20/2023    To contact Endocrine Diabetes service:   From 7AM-5PM: page inpatient diabetes provider who is following the patient that day (see filed or incomplete progress notes/consult notes).  If uncertain of provider assignment: page job code [id number]. (To page job code in-house dial 3 stars, 777 then enter number).  For questions or updates AFTER HOURS from 5PM-7AM: page the diabetes job code for on call fellow: [phone]    Please notify inpatient diabetes service if changes are planned to steroids, nutrition, or if procedures are planned requiring prolonged NPO status. Diabetes Management Team job code: [email or web address]     I spent a total of 50 minutes on the date of the encounter doing prep/post-work, chart review, history and exam, documentation and further activities per the note including lab review, multidisciplinary communication, counseling the patient and/or coordinating care regarding acute hyper/hypoglycemic management, as well as discharge management and planning/communication.  See note for details.

## 2023-05-20 NOTE — PROGRESS NOTES
6MS DISCHARGE    D: Patient discharged to home at 1415. Patient accompanied by transport and picked up by son.     I: Discharge prescriptions given to patient. All discharge medications and instructions reviewed with patient. Patient instructed to seek care if experiencing worsening symptoms.  Other phone numbers to call with questions or concerns after discharge reviewed. Right PIV removed. Education completed.    A: Patient verbalized understanding of discharge medications and instructions. Prescribed home medications given to patient.  Belongings returned to patient.    P: Patient to follow-up on June 16th with Dr. Moran and Radha GUPTA on June 30th.

## 2023-05-21 ENCOUNTER — MYC REFILL (OUTPATIENT)
Dept: PALLIATIVE CARE | Facility: CLINIC | Age: 43
End: 2023-05-21
Payer: MEDICAID

## 2023-05-21 DIAGNOSIS — G89.3 CANCER ASSOCIATED PAIN: ICD-10-CM

## 2023-05-22 ENCOUNTER — PATIENT OUTREACH (OUTPATIENT)
Dept: CARE COORDINATION | Facility: CLINIC | Age: 43
End: 2023-05-22
Payer: MEDICAID

## 2023-05-22 LAB
BACTERIA BLD CULT: NO GROWTH
BACTERIA BLD CULT: NO GROWTH

## 2023-05-22 RX ORDER — OXYCODONE HYDROCHLORIDE 5 MG/1
5-10 TABLET ORAL EVERY 4 HOURS PRN
Qty: 60 TABLET | Refills: 0 | Status: SHIPPED | OUTPATIENT
Start: 2023-05-22 | End: 2023-05-26

## 2023-05-22 NOTE — TELEPHONE ENCOUNTER
Received [employer] message from patient requesting refill of oxycodone.     Last refill: 5/1/23  Last office visit: 4/4/23, brief phone conversation 5/8  Scheduled for follow up pending, msg sent to scheduling.     Will route request to MD for review.     Reviewed [place].

## 2023-05-22 NOTE — PROGRESS NOTES
Clinic Care Coordination Contact  [facility]: Post-Discharge Note  SITUATION                                                      Admission:    Admission Date: 05/17/23   Reason for Admission: polyuria, polydipsia, and generally feeling unwell  Discharge:   Discharge Date: 05/20/23  Discharge Diagnosis: Insulin-requiring type 2 diabetes mellitus  #Hyperosmolar hyperglycemic state  #Metastatic non-small cell lung carcinoma  #Brain metastases  #Tinnitus  #Periorbital edema  #Anorexia  #Hypertension    BACKGROUND                                                      Per hospital discharge summary and inpatient provider notes:    Connor Emerson is a 43-year-old male admitted on 5/17/2023. He has history of metastatic non-small cell lung carcinoma with brain metastases on a recent course of Decadron and presents with polyuria, polydipsia, and generally feeling unwell found to have BG in 600's and diagnosed with hyperosmolar hyperglycemic state. He was started on an insulin drip and admitted to hospital medicine.    ASSESSMENT           Discharge Assessment  How are you doing now that you are home?: pretty good  How are your symptoms? (Red Flag symptoms escalate to triage hotline per guidelines): Improved  Do you feel your condition is stable enough to be safe at home until your provider visit?: Yes  Does the patient have their discharge instructions? : Yes  Does the patient have questions regarding their discharge instructions? : No (wife has reviewed and has no questions per patient)  Were you started on any new medications or were there changes to any of your previous medications? : No (request for pain medication has been sent to provider today)  Does the patient have all of their medications?: Yes  Do you have questions regarding any of your medications? : No (let him know he likely has access to [facility] pharmacist and diabetic educator if needed and he can send a [employer] message to his provider for assistance if  "needed. Wife currently \"taking over\" medications and he is doing well currently)  Do you have all of your needed medical supplies or equipment (DME)?  (i.e. oxygen tank, CPAP, cane, etc.): Yes  Discharge follow-up appointment scheduled within 14 calendar days? : No  Is patient agreeable to assistance with scheduling? : No (patient states he will send [employer] message to his provider for appointment and declines assistance from RN)         Post-op (Clinicians Only)  Did the patient have surgery or a procedure: No  Urinary Status: voiding without complaint/concerns    Patient doing well. Blood sugar readings 210-230 and was told they should continue to decrease once steroid therapy has been completed.  Wife helping manage medications. Patient has requested pain medication from provider today. No further questions or concerns per patient. 24/7 [facility] nurse triage phone number provided to patient.       PLAN                                                      Outpatient Plan:  Follow-up Appointments     Adult [facility] Follow-up and recommended labs and tests      It is important that you see your primary care doctor for follow up. Your   blood sugar will likely decrease once you stop taking dexamethasone (last   dose on June 1st). Follow up with primary care provider, Radha Shetty, within 7-10 days for hospital follow- up and blood sugar check   with medication adjustment. Please bring your blood sugar journal with   you.     Future Appointments   Date Time Provider Department Center   6/16/2023  9:00 AM [facility]   6/16/2023 11:00 AM Stephen Moran MD [facility]    6/30/2023 11:00 AM Radha Shetty Ra, APRN CNP [facility]         For any urgent concerns, please contact our 24 hour nurse triage line: [phone] ([phone])         Nciole Westbrook RN                "

## 2023-05-25 ENCOUNTER — TELEPHONE (OUTPATIENT)
Dept: NEUROSURGERY | Facility: CLINIC | Age: 43
End: 2023-05-25
Payer: MEDICAID

## 2023-05-25 NOTE — TELEPHONE ENCOUNTER
Patient's pharmacy in [place] has shortage of dexamethasone and oxycodone and are unable to refill his medication. Patient requesting prescriptions to be sent to [facility] pharmacy instead.

## 2023-05-25 NOTE — TELEPHONE ENCOUNTER
Sent messages to the respective prescribers of needed medications to resend prescriptions to the [facility] pharmacy as per patient request.

## 2023-05-26 DIAGNOSIS — G89.3 CANCER ASSOCIATED PAIN: Primary | ICD-10-CM

## 2023-05-26 DIAGNOSIS — G89.3 CANCER ASSOCIATED PAIN: ICD-10-CM

## 2023-05-26 RX ORDER — OXYCODONE HYDROCHLORIDE 5 MG/1
5-10 TABLET ORAL EVERY 4 HOURS PRN
Qty: 60 TABLET | Refills: 0 | Status: ON HOLD | OUTPATIENT
Start: 2023-05-26 | End: 2023-06-29

## 2023-05-26 RX ORDER — OXYCODONE HYDROCHLORIDE 5 MG/1
5-10 TABLET ORAL EVERY 4 HOURS PRN
Qty: 60 TABLET | Refills: 0 | Status: SHIPPED | OUTPATIENT
Start: 2023-05-26 | End: 2023-06-17

## 2023-05-26 NOTE — TELEPHONE ENCOUNTER
Resending prescription for oxycodone to a different pharmacy who has it in stock.    VINH StaplesN, RN  Palliative Care Nurse Clinician    [phone] (Direct)  [phone] (Main)  [phone] (Appointment Scheduling)

## 2023-05-30 ENCOUNTER — TELEPHONE (OUTPATIENT)
Dept: ONCOLOGY | Facility: CLINIC | Age: 43
End: 2023-05-30
Payer: MEDICAID

## 2023-05-30 NOTE — TELEPHONE ENCOUNTER
Oral Chemotherapy Monitoring Program    Called to follow-up on discharge and update on brigatinib. Patient's voicemail was full so unable to leave a message.    Edy Watson, Pharmacy Intern  Oral Chemotherapy Monitoring Program  [phone]

## 2023-05-31 ENCOUNTER — MYC MEDICAL ADVICE (OUTPATIENT)
Dept: ONCOLOGY | Facility: CLINIC | Age: 43
End: 2023-05-31
Payer: MEDICAID

## 2023-06-01 ENCOUNTER — TELEPHONE (OUTPATIENT)
Dept: ONCOLOGY | Facility: CLINIC | Age: 43
End: 2023-06-01
Payer: MEDICAID

## 2023-06-01 NOTE — ORAL ONC MGMT
Oral Chemotherapy Monitoring Program    Subjective/Objective:  Connor Emerson is a 43 year old male contacted by phone for a follow-up visit for oral chemotherapy.  Connor confirms taking brigatinib 180 mg (6x30 mg tabs) daily. He states he has been taking this since hospital discharge. He has noticed a significant increase in muscle and joint aches since increasing his dose, which he takes methadone and oxycodone for, but states they are not effective. He has not tried anything else for this. Recommended that he try using either acetaminophen or ibuprofen as needed for joint or muscle aches. He does not feel the need for further intervention at this time, and plans to continue on the 180 mg daily dose until his next appointment with Dr Persaud later this month. No other adverse effects, missed doses, or medication changes recently. He states that he has ~2 weeks of brigatinib on hand, even with taking 6 tablets daily. Last refill was delivered 5/16 per pharmacy records and would only have been a 15 day supply on full dose, but he states he had some leftover from prior fills.         3/31/2023     3:00 PM 4/7/2023     8:00 AM 5/9/2023    10:00 AM 5/9/2023    11:00 AM 5/9/2023    12:00 PM 5/30/2023     1:00 PM 6/1/2023     2:00 PM   ORAL CHEMOTHERAPY   Assessment Type Other Refill  Refill  Left Voicemail Monthly Follow up   Diagnosis Code Non-Small Cell Lung Cancer Non-Small Cell Lung Cancer Non-Small Cell Lung Cancer Non-Small Cell Lung Cancer Non-Small Cell Lung Cancer Non-Small Cell Lung Cancer Non-Small Cell Lung Cancer   Providers Dr Cori Persaud   [facility]   Drug Name Alunbrig (brigatinib) Alunbrig (brigatinib) Alunbrig (brigatinib) Alunbrig (brigatinib) Alunbrig (brigatinib) Alunbrig (brigatinib) Alunbrig (brigatinib)   Dose 90 mg 90 mg 180 mg 90 mg 180 mg 180 mg 180 mg  "  Current Schedule Daily Daily Daily Daily Daily Daily Daily   Cycle Details Continuous Continuous Continuous Continuous Continuous Continuous Continuous   Start Date of Last Cycle       5/20/2023   Planned next cycle start date       6/15/2023   Doses missed in last 2 weeks       0   Adherence Assessment       Adherent   Adverse Effects       Myalgias/Arthralgias   Myalgias/Arthralgias       Grade 2   Pharmacist Intervention(myalgias/arthralgias)       Yes   Intervention(s)       OTC recommendation   Any new drug interactions?       No   Is the dose as ordered appropriate for the patient?       Yes       Last PHQ-2 Score on record:       4/6/2023    12:48 PM 6/27/2022     9:00 AM   PHQ-2 ( 1999 Pf[employer])   Q1: Little interest or pleasure in doing things 0 0   Q2: Feeling down, depressed or hopeless 1 0   PHQ-2 Score 1 0   Q1: Little interest or pleasure in doing things Not at all Not at all   Q2: Feeling down, depressed or hopeless Several days Not at all   PHQ-2 Score 1 0       Vitals:  BP:   BP Readings from Last 1 Encounters:   05/20/23 (!) 142/87     Wt Readings from Last 1 Encounters:   05/17/23 93 kg (205 lb)     Estimated body surface area is 2.13 meters squared as calculated from the following:    Height as of 5/17/23: 1.753 m (5' 9\").    Weight as of 5/17/23: 93 kg (205 lb).    Labs:  _  Result Component Current Result Ref Range   Sodium 142 (5/20/2023) 136 - 145 mmol/L     _  Result Component Current Result Ref Range   Potassium 3.9 (5/20/2023) 3.4 - 5.3 mmol/L     _  Result Component Current Result Ref Range   Calcium 9.1 (5/20/2023) 8.6 - 10.0 mg/dL     _  Result Component Current Result Ref Range   Magnesium 2.4 (H) (5/20/2023) 1.7 - 2.3 mg/dL     _  Result Component Current Result Ref Range   Phosphorus 3.2 (5/20/2023) 2.5 - 4.5 mg/dL     _  Result Component Current Result Ref Range   Albumin 3.8 (5/20/2023) 3.5 - 5.2 g/dL     _  Result Component Current Result Ref Range   Urea Nitrogen 14.0 (5/20/2023) " 6.0 - 20.0 mg/dL     _  Result Component Current Result Ref Range   Creatinine 0.55 (L) (5/20/2023) 0.67 - 1.17 mg/dL     _  Result Component Current Result Ref Range   AST 15 (5/20/2023) 10 - 50 U/L     _  Result Component Current Result Ref Range   ALT 20 (5/20/2023) 10 - 50 U/L     _  Result Component Current Result Ref Range   Bilirubin Total 0.5 (5/20/2023) <=1.2 mg/dL     _  Result Component Current Result Ref Range   WBC Count 12.5 (H) (5/20/2023) 4.0 - 11.0 10e3/uL     _  Result Component Current Result Ref Range   Hemoglobin 15.1 (5/20/2023) 13.3 - 17.7 g/dL     _  Result Component Current Result Ref Range   Platelet Count 196 (5/20/2023) 150 - 450 10e3/uL     No results found for ANC within last 30 days.     _  Result Component Current Result Ref Range   Absolute Neutrophils 7.7 (5/20/2023) 1.6 - 8.3 10e3/uL          Assessment/Plan:  Connor is taking brigatinib 180 mg daily. He recently increased to this dose and is having myalgias and arthralgias since increasing. He will try acetaminophen or ibuprofen for these, but does not require any other intervention at this time.     Follow-Up:  Later this month with Dr Persaud (scheduling to set up)    Refill Due:  ~6/15    Ayde Bateman, PharmD, BCOP  Hematology/Oncology Clinical Pharmacist  [facility]  [facility]  [phone]

## 2023-06-02 ENCOUNTER — HEALTH MAINTENANCE LETTER (OUTPATIENT)
Age: 43
End: 2023-06-02

## 2023-06-06 ENCOUNTER — VIRTUAL VISIT (OUTPATIENT)
Dept: ONCOLOGY | Facility: CLINIC | Age: 43
End: 2023-06-06
Attending: NURSE PRACTITIONER
Payer: COMMERCIAL

## 2023-06-06 ENCOUNTER — VIRTUAL VISIT (OUTPATIENT)
Dept: RADIATION ONCOLOGY | Facility: HOSPITAL | Age: 43
End: 2023-06-06
Attending: FAMILY MEDICINE
Payer: COMMERCIAL

## 2023-06-06 VITALS — BODY MASS INDEX: 30.36 KG/M2 | HEIGHT: 69 IN | WEIGHT: 205 LBS

## 2023-06-06 DIAGNOSIS — C34.90 NON-SMALL CELL LUNG CANCER, UNSPECIFIED LATERALITY (H): ICD-10-CM

## 2023-06-06 DIAGNOSIS — C34.31 MALIGNANT NEOPLASM OF LOWER LOBE OF RIGHT LUNG (H): Primary | ICD-10-CM

## 2023-06-06 DIAGNOSIS — Z51.5 PALLIATIVE CARE PATIENT: Primary | ICD-10-CM

## 2023-06-06 DIAGNOSIS — M79.10 MYALGIA: ICD-10-CM

## 2023-06-06 DIAGNOSIS — Z79.4 TYPE 2 DIABETES MELLITUS WITH KETOACIDOSIS WITHOUT COMA, WITH LONG-TERM CURRENT USE OF INSULIN (H): ICD-10-CM

## 2023-06-06 DIAGNOSIS — C79.31 MALIGNANT NEOPLASM METASTATIC TO BRAIN (H): ICD-10-CM

## 2023-06-06 DIAGNOSIS — E11.10 TYPE 2 DIABETES MELLITUS WITH KETOACIDOSIS WITHOUT COMA, WITH LONG-TERM CURRENT USE OF INSULIN (H): ICD-10-CM

## 2023-06-06 DIAGNOSIS — G89.3 CANCER ASSOCIATED PAIN: ICD-10-CM

## 2023-06-06 DIAGNOSIS — C79.31 METASTASIS TO BRAIN (H): ICD-10-CM

## 2023-06-06 PROCEDURE — 99215 OFFICE O/P EST HI 40 MIN: CPT | Mod: 93 | Performed by: NURSE PRACTITIONER

## 2023-06-06 PROCEDURE — 99215 OFFICE O/P EST HI 40 MIN: CPT | Mod: VID | Performed by: FAMILY MEDICINE

## 2023-06-06 ASSESSMENT — PAIN SCALES - GENERAL: PAINLEVEL: NO PAIN (0)

## 2023-06-06 NOTE — PATIENT INSTRUCTIONS
"It was good to see you today, Connor.  Thanks for the reference to Tate Mckeon.  I just watched the video of his song, \"Cancer.\"  That is gut wrenching. Thank you for guiding me to it. I am sorry things have been shitty for you.    Here are the things we talked about:  Increase the Methadone to 5 mg twice a day  Get an EKG done in the clinic in 3-4 weeks to monitor the effect of the methadone on your heart.  It is OK to stop the Celebrex and see if your pain worsens.  If it does, then restart it and if it doesn't  then stay off it.    Try using Ambien 1/2 tablet (2.5 mg) at bedtime for a couple of nights to see if it affects your sleep and day time drowsiness.    Check with the dispensing pharmacist at your medical cannabis dispensary to see if they have any other formulations that might help with stimulating your appetite.    Someone from the team will reach out to schedule a follow up appointment in 4 weeks and we can always see you sooner if needed.       How to get a hold of us:  For non-urgent matters, MyChart works best.    For more urgent matters, or if you prefer not to use MyChart, call our clinic nurse coordinator Reyna Ma RN at [phone]    We have an on-call number for evenings and weekends. Please call this only if you are having uncontrolled symptoms or serious side effects from your medicines: [phone].     For refills, please give us a week (5 working days) notice. We don't always have providers available everyday to do refills. If you call the day you run out of your medicine, we may not be able to refill it in time, so call 5 days in advance!    Ajith Brewer MD MS FAAFP CAQHPM  [facility]  Office [phone]  Fax [phone]     " (0) none

## 2023-06-06 NOTE — NURSING NOTE
Is the patient currently in the state of [place]? YES    Visit mode:VIDEO    If the visit is dropped, the patient can be reconnected by: VIDEO VISIT: Text to cell phone: [phone]    Will anyone else be joining the visit? NO      How would you like to obtain your AVS? MyChart    Are changes needed to the allergy or medication list? NO    Reason for visit: RECHECK      No other pt vitals to report per pt    Purnima Elder VF

## 2023-06-06 NOTE — PROGRESS NOTES
Virtual Visit Details    Type of service:  Video Visit   Video Start Time: 11:29 AM  Video End Time:11:56 AM    Originating Location (pt. Location): Home    Distant Location (provider location):  On-site  Platform used for Video Visit: [facility]     Palliative Care Outpatient Clinic Progress Note    Patient Name: Connor Emerson  Primary Provider: Radha Shetty Ra    Impression & Recommendations & Counseling:  Connor Emerson is a 42 year old male with history of NSCLC with ALK rearrangement and mets to the brain s/p gamma knife treatments and craniotomy open excision and currently on a second line TKI. He is experiencing 'stiffness' from the TKI.  ECO  Decisional Capacity: very present     PDMP review:  Yes, no concerns     Metastatic NSCLC with ALK rearrangement  S/p GK to brain mets and craniotomy for excision  Cancer associated pain  TKI associated muscle stiffness     Goals of Care:  Connor wants to continue cancer-directed therapies as long as the side effects are tolerable.  He is a FULL code should he have a cardiac arrest. He is OK being cared for in the acute care hospital if needed.     Recommendations & Counseling:  Continue cancer care per Dr. Persaud and his team  Increase Methadone from 2.5 mg in AM and 5 mg at HS to 5 mg po BID; he has not felt over sedated.  Continue oxycodone 5-10 mg po q 4 hours prn; he will call when he needs a refill  Reduce Ambien to 2.5 mg at HS for a few days to see if it isn't as effective and reduce daytime somnolence.  Narcan nasal spray also sent to pharmacy  Stop Celecoxib 100 mg po BID and if stiffness worsens or pain does, then resume it.  Check ECG in 4 weeks to monitor QTc while titrating methadone  Connor will ask Chelsea Corrales at his appointment later today if it is time for a short steroid burst for his stiffness.  F/up in 4 weeks and sooner prn.     Counseling: All of the above was explained to the patient in lay language. The patient has verbalized a clear  understanding of the discussion, asked appropriate questions, which have been answered to patient's apparent satisfaction. The patient is in agreement with the above plan.        Chief Complaint/Patient ID: Connor Emerson 42 year old male with PMHx of  NSCLC with ALK rearrangement and mets to the brain s/p gamma knife treatments and craniotomy open excision and currently on a second line TKI. He is experiencing 'stiffness' from the TKI.      Last Palliative care appointment: 5/8/2023 with me     Reviewed: yes, no concerns    Interim History:  Connor Emerson is a 43 year old male who is seen today for follow up with Palliative Care via billable video visit.      Pain:  muscles are inflamed and a lot of stiffness; no falls since he was having seizures a year ago    Appetite/Nausea: poor; he's taken in two notches on his belt in the past two weeks;  He is using medical cannabis; Connor was encouraged to reach out to his disensing pharmacist to see if there are other formulations that might help his appetite     Bowels: no current concerns     Sleep: 'amazing' since starting Ambien     Mood: wife says he is in a 'shitty' mood always though Connor feels he is in a 'que sera, sera' mood; he feels like he is lacking ambition to see his [employer] projects through.    Seizures:  None he or his family notices     Coping:  Overall he is struggling due to the stiffness that is painful.    Family History- Reviewed in Epic.    Allergies   Allergen Reactions     Vicodin [Hydrocodone-Acetaminophen] Nausea and Vomiting and GI Disturbance       Social History:  Pertinent changes to social history/social situation since last visit: boss is putting pressure on him and recently told him he's a 'liability'  Key support resources:wife   Advance Directive Status: no ACP docs in Epic    Social History     Tobacco Use     Smoking status: Never     Smokeless tobacco: Never   Vaping Use     Vaping status: Never Used     Passive vaping exposure:  Yes   Substance Use Topics     Alcohol use: Yes     Alcohol/week: 0.0 standard drinks of alcohol     Comment: social     Drug use: No         Allergies   Allergen Reactions     Vicodin [Hydrocodone-Acetaminophen] Nausea and Vomiting and GI Disturbance     Current Outpatient Medications   Medication Sig Dispense Refill     albuterol (PROAIR HFA/PROVENTIL HFA/VENTOLIN HFA) 108 (90 Base) MCG/ACT inhaler Inhale 2 puffs into the lungs every 6 hours as needed for shortness of breath, wheezing or cough 18 g 0     alcohol swab prep pads Use to swab area of injection/jabier as directed. 100 each 3     Alcohol Swabs PADS Use to swab the area of the injection or jabier as directed. Per insurance coverage 100 each 0     blood glucose (NO BRAND SPECIFIED) lancets standard To use to test glucose level in the blood Use to test blood sugar  4  times daily as directed. To accompany glucose monitor brands per insurance coverage. 100 each 3     blood glucose (NO BRAND SPECIFIED) test strip To use to test glucose level in the blood Use to test blood sugar  4 times daily as directed. To accompany glucose monitor brands per insurance coverage. 100 strip 3     blood glucose monitoring (NO BRAND SPECIFIED) meter device kit Use as directed. Per insurance coverage 1 kit 0     blood glucose monitoring (NO BRAND SPECIFIED) meter device kit Use to test blood sugar 2 times daily or as directed. Preferred blood glucose meter OR supplies to accompany: Blood Glucose Monitor Brands: per insurance. 1 kit 0     brigatinib (ALUNBRIG) 30 MG TABS tablet Take 6 tablets (180 mg) by mouth daily 90 tablet 0     celecoxib (CELEBREX) 100 MG capsule Take 1 capsule (100 mg) by mouth 2 times daily 60 capsule 4     citalopram (CELEXA) 20 MG tablet Take 1 tablet (20 mg) by mouth every evening 90 tablet 1     Continuous Blood Gluc Sensor (FREESTYLE IRENE 2 SENSOR) [employer] [facility] [employer] 1 each every 14 days Apply as directed per  instructions 2 each 11      hydrochlorothiazide (HYDRODIURIL) 25 MG tablet Take 1 tablet (25 mg) by mouth daily 90 tablet 1     insulin aspart (NOVOLOG PEN) 100 UNIT/ML pen Inject 0-40 Units Subcutaneous 3 times daily (before meals) Per discharge instructions sliding scale 45 mL 2     insulin glargine (LANTUS PEN) 100 UNIT/ML pen Inject 70 Units Subcutaneous At Bedtime 30 mL 2     insulin pen needle (31G X 8 MM) 31G X 8 MM miscellaneous Use one pen needles daily or as directed. 100 each 0     insulin pen needle (32G X 4 MM) 32G X 4 MM miscellaneous Use as directed by provider. Per insurance coverage 100 each 0     lisinopril (ZESTRIL) 40 MG tablet Take 1 tablet (40 mg) by mouth daily 90 tablet 1     metFORMIN (GLUCOPHAGE XR) 500 MG 24 hr tablet Take 1 tablet (500 mg) by mouth daily (with breakfast) 270 tablet 1     metFORMIN (GLUCOPHAGE XR) 500 MG 24 hr tablet Take 2 tablets (1,000 mg) by mouth daily (with dinner)       methadone (DOLOPHINE) 5 MG tablet Take 0.5 tablets (2.5 mg) by mouth every morning AND 1 tablet (5 mg) At Bedtime. 45 tablet 0     naloxone (NARCAN) 4 MG/0.1ML nasal spray Spray 1 spray (4 mg) into one nostril alternating nostrils as needed for opioid reversal every 2-3 minutes until assistance arrives 0.2 mL 3     oxyCODONE (ROXICODONE) 5 MG tablet Take 1-2 tablets (5-10 mg) by mouth every 4 hours as needed for moderate to severe pain 60 tablet 0     oxyCODONE (ROXICODONE) 5 MG tablet Take 1-2 tablets (5-10 mg) by mouth every 4 hours as needed for severe pain 60 tablet 0     propranolol (INDERAL) 20 MG tablet Take 1 tablet (20 mg) by mouth 2 times daily 180 tablet 1     Sharps Container MISC Use as directed to dispose of needles, lancets and other sharps 1 each 0     zolpidem (AMBIEN) 5 MG tablet Take 1 tablet (5 mg) by mouth nightly as needed for sleep 20 tablet 0     dexamethasone (DECADRON) 2 MG tablet Take 1 tablet (2 mg) by mouth daily (with breakfast) for 12 doses 12 tablet 0     Past Medical History:   Diagnosis Date      Atypical chest pain 12/02/2013     Cancer (H)      Complication of anesthesia      Diabetes (H)      GERD (gastroesophageal reflux disease) 12/02/2013     History of pulmonary embolism      HTN (hypertension) 05/14/2012     HTN, goal below 140/90 07/02/2013     Insomnia 02/21/2012     Mediastinal lymphadenopathy      Migraine headache 07/02/2013     Migraine with aura, without mention of intractable migraine without mention of status migrainosus      Pneumonia      Past Surgical History:   Procedure Laterality Date     BRONCHOSCOPY RIGID OR FLEXIBLE W/TRANSENDOSCOPIC ENDOBRONCHIAL ULTRASOUND GUIDED Bilateral 1/26/2022    Procedure: Right BRONCHOSCOPY, FIBEROPTIC, endobronchial ultrasound, pleural biopsy;  Surgeon: Dallin Agrawal MD;  Location: [facility] OR     INJECT BLOCK MEDIAL BRANCH CERVICAL/THORACIC/LUMBAR       INSERT CHEST TUBE Right 2/16/2022    Procedure: INSERTION, CATHETER, INTERCOSTAL, FOR DRAINAGE;  Surgeon: Dallin Agrawal MD;  Location: [facility] GI     INSERT CHEST TUBE Right 3/9/2022    Procedure: INSERTION, CATHETER, INTERCOSTAL, FOR DRAINAGE;  Surgeon: Sushila Antonio MD;  Location: [facility] GI     IR CHEST TUBE REMOVAL TUNNELED RIGHT  4/2/2022     OPTICAL TRACKING SYSTEM CRANIOTOMY, EXCISE TUMOR, COMBINED Left 6/28/2022    Procedure: Left stealth craniotomy for tumor resection with motor mapping;  Surgeon: Stephen Moran MD;  Location:  OR     ORTHOPEDIC SURGERY      Ganesh. Rotator cuff repair.     PLEUROSCOPY N/A 1/26/2022    Procedure: Pleuroscopy with Pleural Biopsy;  Surgeon: Dallin Agrawal MD;  Location: [facility] OR       Physical Exam:   GENERAL APPEARANCE: alert and no distress; neatly groomed  EYES: Eyes grossly normal to inspection, PERRLA, conjunctivae and sclerae without injection or discharge, EOM intact   RESP:  no increased work of breathing; speaks in complete sentences;   MS: No musculoskeletal defects are noted; paucity of spontaneous movement  SKIN: No suspicious lesions or rashes,  hydration status appears adequate with normal skin turgor   PSYCH: Alert and oriented x3; speech- coherent , normal rate and volume; able to articulate logical thoughts, able to abstract reason, no tangential thoughts, no hallucinations or delusions, mentation appears normal, Mood is euthymic. Affect is appropriate for this mood state and bright. Thought content is free of suicidal ideation, hallucinations, and delusions.  Eye contact is good during conversation.       Key Data Reviewed:  LABS: 5/20/2023- Cr 0.55, Albumin 3.8,  Hgb 15.1,      IMAGING: HEAD CT WO CONTRAST 5/17/2023                                                                   IMPRESSION:  1. Stable head CT demonstrating presumed metastatic nodules in the  inferior right frontal lobe and superior aspect of the left cerebellar  hemisphere as well as postoperative changes to the high left frontal  lobe.  2. No evidence for acute intracranial pathology.     ECG:  QTc 482 (5/17/2023) was 464 last winter    42 minutes spent on the date of the encounter doing chart review, history and exam, patient education & counseling, documentation and other activities as noted above.    Ajith Brewer MD MS FAAFP CAQHPM  [facility] [place] [facility]  Office [phone]  Fax [phone]

## 2023-06-06 NOTE — Clinical Note
6/6/2023         RE: Connor Emerson  [address]  [facility] [address]        Dear Colleague,    Thank you for referring your patient, Connor Emerson, to the [facility]. Please see a copy of my visit note below.    Telephone visit: length of phone call 12 minutes      MEDICAL ONCOLOGY FOLLOW UP NOTE    PATIENT NAME: Connor Emerson  ENCOUNTER DATE: 6/6/2023    Care Team  Primary Oncologist: Bassam Persaud MD    REASON FOR CURRENT VISIT: F/u of lung cancer    HISTORY OF PRESENT ILLNESS:  Mr. Connor Emerson is a 42 year old  male who is a non-smoker with PMHx of T2DM, HTN with metastatic NSCLC comes for follow up     Oncologic Hx:    Diagnosis:   Stage IV NSCLC, Rt lung adenocarcinoma with metastasis to pleura, mediastinum , rt pleural effusion and brain diagnosed 1/2022 (AJCC 8th edition)  PD-L1 TPS 2-3% by [place]   NGS [facility] panel-EML4:ALK rearragement  NGS Guardant- GNAS R201H, KRAS K5E- No ALK    Treatment:    2/23/2022- current: Brigatinib. Dose reduced to 60 mg due to cough after two days of 90 mg daily -->back on 90 mg---> increased to 180 mg at last visit due to CNS progression     3/2/22- 12/2022 Alectinib 300 mg BID (Dose reduced to 450 mg BID from 600 mg BID due to grade 3 myalgias 3/21/22, again reduced to 300 mg BID 9/28/22)    )  Past:  2/15/22- GK to 11 brain lesions  6/28/22- Craniotomy, resection  9/28/22-10/26- Bevacizumab for radiation necrosis (stopped due to PE)      Intent of treatment: Palliative    Oncologic course:  1/19/22 to 1/22/22-Admitted to [facility] for 2 week progressive SOB secondary to have large rt sided pleural effusion, needing thoracentesis x2 (1.7L and 2.0 L removed), cytology positive for malignancy, adenocarcinoma.   1/26/22- Rt pleural mass biopsy-Dr. Agrawal--POSITIVE FOR ADENOCARCINOMA CONSISTENT WITH LUNG PRIMARY, admixed with mesothelial hyperplasia and inflammatory infiltrate (+ TTF-1 and CK 7;  negative  p40, calretinin and WT-1. PAX8  immunostain focal +). 4th thoracentesis done simultaneously - 3L approx removed.   2/1/22- PET/CT-Right lower lobe central infiltrative FDG avid 8.2 x 9.6 cm mass representing a primary lung adenocarcinoma. Ipsilateral right perihilar, bilateral pretracheal, subcarinal and superior mediastinal michele metastases. Contralateral mildly FDG avid few lung nodules are suspicious for contralateral metastasis. At least 3 intracranial metastases in the right frontal lobe, left frontal lobe and left cerebellar hemisphere. Nonspecific mild diffuse bone marrow uptake. Further evaluation with a spine MRI could be considered to rule out early marrow infiltration. This could also be seen with red marrow conversion.  2/5/22-  Brain MRI- At least 9 intracranial metastases as detailed above. The dominant lesions involving the orbital right frontal lobe, the posterior left middle frontal gyrus, anterior right temporal lobe and in the left cerebellar hemisphere have surrounding moderate vasogenic type edema.  2/15/22- Saw Dr. Arango from Rad Onc- Rcd GK to 12 lesion in bran  2/16/22- Pleurex placement   3/2/22- Started Alectinib 600 mg BID  3/21/22- Dose reduced to 450 mg BID due to grade 3 myalgias and fatigue  4/2/22 to 4/5/22- Admitted at [facility] for- Severe sepsis due to MSSA infection of right PleurX catheter s/p removal- He presented with onset of pain at tube site starting 4/1; at arrival was tachycardic with leukocytosis (22.7) and elevated lactic acid (2.9).  CT chest showed fluid and stranding tracking outside the pleural space into chest wall along pleural catheter.  IR was consulted and removed catheter 4/2 with report of pustular drainage and tip culture growing MSSA.  Thoracic Surg was consulted who felt no surgical indication necessary given minimal pleural fluid and lack of any signs of abscess.  Initially treated with broad spectrum coverage for sepsis, narrowed to Ancef once sensitivities returned with plan to  transition to cefadroxil for an additional 10 days at discharge per ID. Held drug 4/2 to 4/11 5/2/22- CT CAP- Overall, positive response to therapy with decreased size of right lower lobe and right pleural-based masses, pulmonary metastases, hilar and mediastinal lymphadenopathy. However, a single right posterior pleural-based mass has slightly increased in size since 2/24/2022. No metastatic disease in the abdomen and pelvis. Right Pleurx catheter has been removed. Trace right pleural effusion and right basilar atelectasis.  5/2/22- Brain MRI- The previously demonstrated brain metastases are mildly diminished in size versus to 2/5/2022. The degree of edema is also diminished but not completely resolved. Probable trace amounts of intralesional bleeding demonstrated on the gradient sequence within the metastases. No definite new metastasis or progressive mass effect. No hydrocephalus or infarct.    6/15/22 to 6/17/22- Admitted at [facility]-with aphasia and word finding difficulty over last few weeks.  He presented to [facility] ED on 6/10 for evaluation of his symptoms. MRI brain showed multiple intracranial metastases, with interval enlargement of the dominant lesion within the left frontal lobe and increased surrounding vasogenic edema with 2 mm rightward shift of the septum pellucidum. Due to his worsening anxiety, he left AMA. His symptoms continued to progress to where he could not write at work so he decided to go to the ED for re-evaluation and treatment. Evaluated by NSGY, Rad Onc (radiaiton necrosis vs tumor progression).  6/16/22- MR Brain (6/16) shows multiple intracranial metastases, with interval enlargement  of the dominant lesion within the left frontal lobe and increased surrounding vasogenic edema with 2 mm rightward shift of the septum pellucidum.  6/16/22- - CT CAP shows slightly decreased size of right lower lobe and right pleural-based masses. No new pulmonary nodules or lymphadenopathy; No evidence of  metastatic disease in the abdomen or pelvis.   6/28/22 to 6/30/22- Admitted at [facility]- Elective left Stealth craniotomy with resection of brain tumor due to ongoing symptoms. No intraoperative complications. EBL 50 ml.  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  7/5/22- Ct CAP- Right lower lobe low-density nodules are not significantly changed. A small left upper lobe pulmonary nodule is also unchanged. Trace pleural fluid on the right has increased slightly. No convincing evidence for metastatic disease in the abdomen or pelvis.  7/18/22 to 7/19/22- Admitted to [facility] for seizure- Reportedly was only taking once Keppra instead of twice daily. Also resumed on dexamethasone 2 mg daily  8/1/22- Brain MRI- Redemonstrated postsurgical changes status post left frontoparietal Craniotomy. Interval increase in size of the dominant ring-enhancing lesion in the left posterior superior frontal lobe with increased moderate surrounding vasogenic edema and local mass effect resulting in narrowing of the supratentorial ventricular system. No significant midline shift/herniation at this time  8/1/22- Dex was increased to 4 mg daily by Dr. Moran  9/1/22- CT Chest- Near resolution of previously seen right pleural nodule. Stable right lower lobe pulmonary nodule  9/28/22- Bevacizumab for radiation necrosis  10/26/22- Bevacizumab   10/26/22- Ct CAP- Stable posterior medial right lower lobe 1.9 x 1.1 cm nodule series 8 image 176. Adjacent stable scarring and atelectasis. The previously noted pleural nodule posteriorly on the right is not currently clearly identified. Stable left upper lobe 0.3 cm nodule image 56  10/26/22- Brain MRI- Overall improved appearance of multiple intracranial metastases with near resolution of associated edema and diminished enhancement and size of multiple residual lesions. No definite new or progressive metastasis.  11/7/22 to 11/9/22- Admitted for PE and HTN urgency- Small pulmonary embolism in the  right lower lobe pulmonary artery. started on Lovenox, Brain MRI neg for PRES.  12/29/22- ED visit- bilateral hip pain, pain in shoulders, knuckles, knees, and ankles- holding alectinib since 12/20/22 1/6/23- Ct CAP- Mild groundglass nodularity in the left upper lobe is new since the previous exam, and may be infectious in etiology. No other significant interval change. Pulmonary nodules are not significantly changed.  1/6/23- Brain MRI- Stable to diminished sequelae of intracranial metastasis and treatment changes. No new or progressive metastasis. No superimposed acute intracranial finding.    2/23/2022- Start Brigatinib. Dose reduced to 60 mg due to cough after two days of 90 mg daily -  3/23/23-Brigatinib  (increase to 90 mg)  4/20/23- CT CAP- Stable- Previously noted mild groundglass nodularity in the left upper lobe has resolved.Pulmonary nodules are unchanged.Trace amount pleural fluid on the right has decreased slightly.  4/20/23- Brain MRI- Possile progression- Largest metastasis within the right frontal lobe has increased in size with worsening peripheral nodular enhancement and worsening vasogenic edema contributing to new right to left midline shift.  Multiple new/enlarging metastases scattered throughout the cerebral hemispheres and cerebellum. Started on dexamethasone by NGS on 4/28/23 5/17/23- presents to clinic with glucose 627, admission to hospital for stability on insulin drip    He works as a maintenance manger for apartment complexes    Interval Hx:  Severe arthralgias with 180 mg dose  Has a headache the last 3 days, no other neuro symptoms  The blood sugars were stable  Has sharp pleuretic pain, has been there since his pleurex removed really     ECOG PS 0    REVIEW OF SYSTEMS: 14 point ROS negative other than the symptoms noted above in the HPI.    Wt Readings from Last 4 Encounters:   06/06/23 93 kg (205 lb)   05/17/23 93 kg (205 lb)   05/17/23 93.4 kg (205 lb 14.6 oz)   04/06/23 100.1 kg  (220 lb 11.2 oz)      Review of Systems:  A comprehensive ROS was performed and found to be negative or non-contributory with the exception of that noted in the HPI above.    Past Medical History:  GERD  Hypertension, not on medication  Type 2 diabetes mellitus, not on medications currently, previously on Metformin    Past Surgical History:  Past Surgical History:   Procedure Laterality Date     BRONCHOSCOPY RIGID OR FLEXIBLE W/TRANSENDOSCOPIC ENDOBRONCHIAL ULTRASOUND GUIDED Bilateral 1/26/2022    Procedure: Right BRONCHOSCOPY, FIBEROPTIC, endobronchial ultrasound, pleural biopsy;  Surgeon: Dallin Agrawal MD;  Location: [facility] OR     INJECT BLOCK MEDIAL BRANCH CERVICAL/THORACIC/LUMBAR       INSERT CHEST TUBE Right 2/16/2022    Procedure: INSERTION, CATHETER, INTERCOSTAL, FOR DRAINAGE;  Surgeon: Dallin Agrawal MD;  Location: [facility] GI     INSERT CHEST TUBE Right 3/9/2022    Procedure: INSERTION, CATHETER, INTERCOSTAL, FOR DRAINAGE;  Surgeon: Sushila Antonio MD;  Location: [facility] GI     IR CHEST TUBE REMOVAL TUNNELED RIGHT  4/2/2022     OPTICAL TRACKING SYSTEM CRANIOTOMY, EXCISE TUMOR, COMBINED Left 6/28/2022    Procedure: Left stealth craniotomy for tumor resection with motor mapping;  Surgeon: Stephen Moran MD;  Location:  OR     ORTHOPEDIC SURGERY      Ganesh. Rotator cuff repair.     PLEUROSCOPY N/A 1/26/2022    Procedure: Pleuroscopy with Pleural Biopsy;  Surgeon: Dallin gArawal MD;  Location:  OR       Social History:  Lives with wife and 4 kids in [place]. Works as a  for an apartment complex in [place]. Exposure to household chemicals and . No significant exposure to asbestos. No signal exposure to benzene or similar chemicals. No significant smoking history-states that he smoked 1 to 2 cigarettes occasionally per month for about 2 years in college, non-smoking since then. No significant alcohol use history. No other recreational substances. Good support system. Kids are 23,  19, 17 and 13.    Family History  Significant history for cancers on maternal side. Mother  of uterine cancer. 2 maternal uncles have possible metastatic melanoma.    Outpatient Medications:  Current Outpatient Medications   Medication     albuterol (PROAIR HFA/PROVENTIL HFA/VENTOLIN HFA) 108 (90 Base) MCG/ACT inhaler     alcohol swab prep pads     Alcohol Swabs PADS     blood glucose (NO BRAND SPECIFIED) lancets standard     blood glucose (NO BRAND SPECIFIED) test strip     blood glucose monitoring (NO BRAND SPECIFIED) meter device kit     blood glucose monitoring (NO BRAND SPECIFIED) meter device kit     brigatinib (ALUNBRIG) 30 MG TABS tablet     celecoxib (CELEBREX) 100 MG capsule     citalopram (CELEXA) 20 MG tablet     Continuous Blood Gluc Sensor (FREESTYLE IRENE 2 SENSOR) MISC     hydrochlorothiazide (HYDRODIURIL) 25 MG tablet     insulin aspart (NOVOLOG PEN) 100 UNIT/ML pen     insulin glargine (LANTUS PEN) 100 UNIT/ML pen     insulin pen needle (31G X 8 MM) 31G X 8 MM miscellaneous     insulin pen needle (32G X 4 MM) 32G X 4 MM miscellaneous     lisinopril (ZESTRIL) 40 MG tablet     metFORMIN (GLUCOPHAGE XR) 500 MG 24 hr tablet     metFORMIN (GLUCOPHAGE XR) 500 MG 24 hr tablet     methadone (DOLOPHINE) 5 MG tablet     naloxone (NARCAN) 4 MG/0.1ML nasal spray     oxyCODONE (ROXICODONE) 5 MG tablet     oxyCODONE (ROXICODONE) 5 MG tablet     propranolol (INDERAL) 20 MG tablet     [employer] MISC     zolpidem (AMBIEN) 5 MG tablet     dexamethasone (DECADRON) 2 MG tablet     No current facility-administered medications for this visit.     Objective:  There were no vitals taken for this visit.  General: alert and no distress  Psych: Alert and oriented times; coherent speech, normal rate and volume, able to articulate logical thoughts, able to abstract reason, no tangential thoughts, no hallucinations or delusions  Patient's affect is appropriate.   Pulm: Speaking in full sentences, unlabored, no  "audible wheezes or cough.  The rest of a comprehensive physical examination is deferred due to PHE (public health emergency) video restrictions\"    Labs & Studies: I personally reviewed the following studies:  Most Recent 3 CBC's:  Recent Labs   Lab Test 05/20/23  0840 05/19/23  0846 05/18/23  0609   WBC 12.5* 14.4* 10.8   HGB 15.1 14.3 13.7   MCV 92 90 90    185 192     Most Recent 3 BMP's:  Recent Labs   Lab Test 05/20/23  1241 05/20/23  1209 05/20/23  1108 05/20/23  0602 05/20/23  0601 05/19/23  0900 05/19/23  0846 05/18/23  1312 05/18/23  1231 05/18/23  0704 05/18/23  0609   NA  --   --   --   --  142  --  136  --   --   --  135*   POTASSIUM  --   --   --   --  3.9  --  5.1  --  4.0  --  3.2*   CHLORIDE  --   --   --   --  105  --  101  --   --   --  99   CO2  --   --   --   --  29  --  28  --   --   --  27   BUN  --   --   --   --  14.0  --  10.9  --   --   --  14.3   CR  --   --   --   --  0.55*  --  0.53*  --   --   --  0.55*   ANIONGAP  --   --   --   --  8  --  7  --   --   --  9   TORY  --   --   --   --  9.1  --  9.0  --   --   --  8.1*   * 143* 143*   < > 93   < > 212*   < >  --    < > 154*    < > = values in this interval not displayed.    Most Recent 2 LFT's:  Recent Labs   Lab Test 05/20/23  0601 05/19/23  0846   AST 15 16   ALT 20 18   ALKPHOS 60 59   BILITOTAL 0.5 0.6    Most Recent TSH and T4:  Recent Labs   Lab Test 09/12/22  1642   TSH 2.56        ASSESSMENT AND PLAN:  Stage IV NSCLC, Rt lung adenocarcinoma with metastasis to pleura, mediastinum , rt pleural effusion and brain diagnosed 1/2022 (AJCC 8th edition)  PD-L1 TPS 2-3% by [place]   NGS [facility] panel-EML4:ALK rearragement; chr2:[id number], chr2:[id number]  NGS Guardant- GNAS R201H, KRAS K5E- No ALK    He began Alectinib 600 mg BID 3/2/22 and unfortunately developed grade 3 myalgias which have improved with lowering the dose 450 mg BID. He was holding drug 4/2/22 to 4/11/22 due to MSSA infection from pleurex which is removed. " Resumed at 450 mg BID. Due to ongoing myalgias (Although CK is normal), we dose reduced to 300 mg BID.     In Dec 2022, developed Gr 3 arthralgia, and we stopped drug since 12/20/22. Had unremitting arthalgias, eventully had to starte PO steroids which led to improvement and resolved. Radiographicaly, he has had a near CR to Rx in the lung, has a residual rt LL lesion.     Began brigatinib 2/22/23 (delayed due to pt hesitancy). Developed severe cough on day 2 so brigatinib was held and cough resolved with in 24-48 hours. He restarted 60 mg daily and upped it to 90 mg.Restging CT showing stable disease in the lung, however, MRI with several contrast enhancement and increase in size of previously treated lesions. His dose was increased to 180 mg daily to address CNS disease.     Unfortunately missed tox follow up then called in feeling very unwell. He was found to have severe hyperglycemia and was admitted. Since discharge 5/20, he has been on 180 mg brigatinib but recently developed myalgias. He had severe myalgias/arthralgias with alectinib too. We discussed trying to manage with pain medication (Working with palliative) but ultimately we may need to hold/dose reduce. I outlined my recommendation to either hold or dose reduce to 120 mg (4 tabs) should his myalgias be too severe in the coming days.     He will see Dr. Persaud for follow up as scheduled later this month. Will confirm no need for chest imaging prior.     #sharp right chest pain:  I suspect this is residual nerve damage from his prior pleurex given its onset and longevity. Hopefully methadone will be helpful for this if it is in fact neuropathic pain.      # Brain mets:  # Radiation necrosis  Per discharge summary, dex 2 mg daily x14 days, completed 6/1. Has follow up MRI later this month    #headache:  No other neuro sx but we will monitor closely. Recommended he try tylenol and ibuprofen    #hyperglycemia:  Acute on chronic--prompting last admission.  Reports glucose has been stable. Following with endo.     40 minutes spent on the date of the encounter doing chart review, review of test results, interpretation of tests, patient visit, documentation and discussion with other provider(s), in addition to 12 minutes spent on the phone with the patient.     Chelsea Villegas CNP on 6/6/2023 at 5:14 PM              Again, thank you for allowing me to participate in the care of your patient.        Sincerely,        Chelsea Villegas CNP

## 2023-06-06 NOTE — PROGRESS NOTES
Telephone visit: length of phone call 12 minutes      MEDICAL ONCOLOGY FOLLOW UP NOTE    PATIENT NAME: Connor Emerson  ENCOUNTER DATE: 6/6/2023    Care Team  Primary Oncologist: Bassam Persaud MD    REASON FOR CURRENT VISIT: F/u of lung cancer    HISTORY OF PRESENT ILLNESS:  Mr. Connor Emerson is a 42 year old  male who is a non-smoker with PMHx of T2DM, HTN with metastatic NSCLC comes for follow up     Oncologic Hx:    Diagnosis:   Stage IV NSCLC, Rt lung adenocarcinoma with metastasis to pleura, mediastinum , rt pleural effusion and brain diagnosed 1/2022 (AJCC 8th edition)  PD-L1 TPS 2-3% by [place]   NGS [facility] panel-EML4:ALK rearragement  NGS Guardant- GNAS R201H, KRAS K5E- No ALK    Treatment:    2/23/2022- current: Brigatinib. Dose reduced to 60 mg due to cough after two days of 90 mg daily -->back on 90 mg---> increased to 180 mg at last visit due to CNS progression     3/2/22- 12/2022 Alectinib 300 mg BID (Dose reduced to 450 mg BID from 600 mg BID due to grade 3 myalgias 3/21/22, again reduced to 300 mg BID 9/28/22)    )  Past:  2/15/22- GK to 11 brain lesions  6/28/22- Craniotomy, resection  9/28/22-10/26- Bevacizumab for radiation necrosis (stopped due to PE)      Intent of treatment: Palliative    Oncologic course:  1/19/22 to 1/22/22-Admitted to [facility] for 2 week progressive SOB secondary to have large rt sided pleural effusion, needing thoracentesis x2 (1.7L and 2.0 L removed), cytology positive for malignancy, adenocarcinoma.   1/26/22- Rt pleural mass biopsy-Dr. Agrawal--POSITIVE FOR ADENOCARCINOMA CONSISTENT WITH LUNG PRIMARY, admixed with mesothelial hyperplasia and inflammatory infiltrate (+ TTF-1 and CK 7;  negative  p40, calretinin and WT-1. PAX8 immunostain focal +). 4th thoracentesis done simultaneously - 3L approx removed.   2/1/22- PET/CT-Right lower lobe central infiltrative FDG avid 8.2 x 9.6 cm mass representing a primary lung adenocarcinoma. Ipsilateral right perihilar, bilateral pretracheal,  subcarinal and superior mediastinal michele metastases. Contralateral mildly FDG avid few lung nodules are suspicious for contralateral metastasis. At least 3 intracranial metastases in the right frontal lobe, left frontal lobe and left cerebellar hemisphere. Nonspecific mild diffuse bone marrow uptake. Further evaluation with a spine MRI could be considered to rule out early marrow infiltration. This could also be seen with red marrow conversion.  2/5/22-  Brain MRI- At least 9 intracranial metastases as detailed above. The dominant lesions involving the orbital right frontal lobe, the posterior left middle frontal gyrus, anterior right temporal lobe and in the left cerebellar hemisphere have surrounding moderate vasogenic type edema.  2/15/22- Saw Dr. Arango from Rad Onc- Rcd GK to 12 lesion in bran  2/16/22- Pleurex placement   3/2/22- Started Alectinib 600 mg BID  3/21/22- Dose reduced to 450 mg BID due to grade 3 myalgias and fatigue  4/2/22 to 4/5/22- Admitted at [facility] for- Severe sepsis due to MSSA infection of right PleurX catheter s/p removal- He presented with onset of pain at tube site starting 4/1; at arrival was tachycardic with leukocytosis (22.7) and elevated lactic acid (2.9).  CT chest showed fluid and stranding tracking outside the pleural space into chest wall along pleural catheter.  IR was consulted and removed catheter 4/2 with report of pustular drainage and tip culture growing MSSA.  Thoracic Surg was consulted who felt no surgical indication necessary given minimal pleural fluid and lack of any signs of abscess.  Initially treated with broad spectrum coverage for sepsis, narrowed to Ancef once sensitivities returned with plan to transition to cefadroxil for an additional 10 days at discharge per ID. Held drug 4/2 to 4/11 5/2/22- CT CAP- Overall, positive response to therapy with decreased size of right lower lobe and right pleural-based masses, pulmonary metastases, hilar and  mediastinal lymphadenopathy. However, a single right posterior pleural-based mass has slightly increased in size since 2/24/2022. No metastatic disease in the abdomen and pelvis. Right Pleurx catheter has been removed. Trace right pleural effusion and right basilar atelectasis.  5/2/22- Brain MRI- The previously demonstrated brain metastases are mildly diminished in size versus to 2/5/2022. The degree of edema is also diminished but not completely resolved. Probable trace amounts of intralesional bleeding demonstrated on the gradient sequence within the metastases. No definite new metastasis or progressive mass effect. No hydrocephalus or infarct.    6/15/22 to 6/17/22- Admitted at [facility]-with aphasia and word finding difficulty over last few weeks.  He presented to [facility] ED on 6/10 for evaluation of his symptoms. MRI brain showed multiple intracranial metastases, with interval enlargement of the dominant lesion within the left frontal lobe and increased surrounding vasogenic edema with 2 mm rightward shift of the septum pellucidum. Due to his worsening anxiety, he left AMA. His symptoms continued to progress to where he could not write at work so he decided to go to the ED for re-evaluation and treatment. Evaluated by NSGY, Rad Onc (radiaiton necrosis vs tumor progression).  6/16/22- MR Brain (6/16) shows multiple intracranial metastases, with interval enlargement  of the dominant lesion within the left frontal lobe and increased surrounding vasogenic edema with 2 mm rightward shift of the septum pellucidum.  6/16/22- - CT CAP shows slightly decreased size of right lower lobe and right pleural-based masses. No new pulmonary nodules or lymphadenopathy; No evidence of metastatic disease in the abdomen or pelvis.   6/28/22 to 6/30/22- Admitted at [facility]- Elective left Stealth craniotomy with resection of brain tumor due to ongoing symptoms. No intraoperative complications. EBL 50 ml.  Path showing radiation necrosis- no  evidence of tumor.  7/5/22- Ct CAP- Right lower lobe low-density nodules are not significantly changed. A small left upper lobe pulmonary nodule is also unchanged. Trace pleural fluid on the right has increased slightly. No convincing evidence for metastatic disease in the abdomen or pelvis.  7/18/22 to 7/19/22- Admitted to [facility] for seizure- Reportedly was only taking once Keppra instead of twice daily. Also resumed on dexamethasone 2 mg daily  8/1/22- Brain MRI- Redemonstrated postsurgical changes status post left frontoparietal Craniotomy. Interval increase in size of the dominant ring-enhancing lesion in the left posterior superior frontal lobe with increased moderate surrounding vasogenic edema and local mass effect resulting in narrowing of the supratentorial ventricular system. No significant midline shift/herniation at this time  8/1/22- Dex was increased to 4 mg daily by Dr. Moran  9/1/22- CT Chest- Near resolution of previously seen right pleural nodule. Stable right lower lobe pulmonary nodule  9/28/22- Bevacizumab for radiation necrosis  10/26/22- Bevacizumab   10/26/22- Ct CAP- Stable posterior medial right lower lobe 1.9 x 1.1 cm nodule series 8 image 176. Adjacent stable scarring and atelectasis. The previously noted pleural nodule posteriorly on the right is not currently clearly identified. Stable left upper lobe 0.3 cm nodule image 56  10/26/22- Brain MRI- Overall improved appearance of multiple intracranial metastases with near resolution of associated edema and diminished enhancement and size of multiple residual lesions. No definite new or progressive metastasis.  11/7/22 to 11/9/22- Admitted for PE and HTN urgency- Small pulmonary embolism in the right lower lobe pulmonary artery. started on Lovenox, Brain MRI neg for PRES.  12/29/22- ED visit- bilateral hip pain, pain in shoulders, knuckles, knees, and ankles- holding alectinib since 12/20/22 1/6/23- Ct CAP- Mild groundglass nodularity in the  left upper lobe is new since the previous exam, and may be infectious in etiology. No other significant interval change. Pulmonary nodules are not significantly changed.  1/6/23- Brain MRI- Stable to diminished sequelae of intracranial metastasis and treatment changes. No new or progressive metastasis. No superimposed acute intracranial finding.    2/23/2022- Start Brigatinib. Dose reduced to 60 mg due to cough after two days of 90 mg daily -  3/23/23-Brigatinib  (increase to 90 mg)  4/20/23- CT CAP- Stable- Previously noted mild groundglass nodularity in the left upper lobe has resolved.Pulmonary nodules are unchanged.Trace amount pleural fluid on the right has decreased slightly.  4/20/23- Brain MRI- Possile progression- Largest metastasis within the right frontal lobe has increased in size with worsening peripheral nodular enhancement and worsening vasogenic edema contributing to new right to left midline shift.  Multiple new/enlarging metastases scattered throughout the cerebral hemispheres and cerebellum. Started on dexamethasone by NGS on 4/28/23 5/17/23- presents to clinic with glucose 627, admission to hospital for stability on insulin drip    He works as a maintenance manger for apartment complexes    Interval Hx:  Severe arthralgias with 180 mg dose  Has a headache the last 3 days, no other neuro symptoms  The blood sugars were stable  Has sharp pleuretic pain, has been there since his pleurex removed really     ECOG PS 0    REVIEW OF SYSTEMS: 14 point ROS negative other than the symptoms noted above in the HPI.    Wt Readings from Last 4 Encounters:   06/06/23 93 kg (205 lb)   05/17/23 93 kg (205 lb)   05/17/23 93.4 kg (205 lb 14.6 oz)   04/06/23 100.1 kg (220 lb 11.2 oz)      Review of Systems:  A comprehensive ROS was performed and found to be negative or non-contributory with the exception of that noted in the HPI above.    Past Medical History:  GERD  Hypertension, not on medication  Type 2 diabetes  mellitus, not on medications currently, previously on Metformin    Past Surgical History:  Past Surgical History:   Procedure Laterality Date     BRONCHOSCOPY RIGID OR FLEXIBLE W/TRANSENDOSCOPIC ENDOBRONCHIAL ULTRASOUND GUIDED Bilateral 2022    Procedure: Right BRONCHOSCOPY, FIBEROPTIC, endobronchial ultrasound, pleural biopsy;  Surgeon: Dallin Agrawal MD;  Location: [facility] OR     INJECT BLOCK MEDIAL BRANCH CERVICAL/THORACIC/LUMBAR       INSERT CHEST TUBE Right 2022    Procedure: INSERTION, CATHETER, INTERCOSTAL, FOR DRAINAGE;  Surgeon: Dallin Agrawal MD;  Location: [facility] GI     INSERT CHEST TUBE Right 3/9/2022    Procedure: INSERTION, CATHETER, INTERCOSTAL, FOR DRAINAGE;  Surgeon: Sushila Antonio MD;  Location: [facility] GI     IR CHEST TUBE REMOVAL TUNNELED RIGHT  2022     OPTICAL TRACKING SYSTEM CRANIOTOMY, EXCISE TUMOR, COMBINED Left 2022    Procedure: Left stealth craniotomy for tumor resection with motor mapping;  Surgeon: Stephen Moran MD;  Location:  OR     ORTHOPEDIC SURGERY      Ganesh. Rotator cuff repair.     PLEUROSCOPY N/A 2022    Procedure: Pleuroscopy with Pleural Biopsy;  Surgeon: Dallin Agrawal MD;  Location:  OR       Social History:  Lives with wife and 4 kids in [place]. Works as a  for an apartment complex in [place]. Exposure to household chemicals and . No significant exposure to asbestos. No signal exposure to benzene or similar chemicals. No significant smoking history-states that he smoked 1 to 2 cigarettes occasionally per month for about 2 years in college, non-smoking since then. No significant alcohol use history. No other recreational substances. Good support system. Kids are 23, 19, 17 and 13.    Family History  Significant history for cancers on maternal side. Mother  of uterine cancer. 2 maternal uncles have possible metastatic melanoma.    Outpatient Medications:  Current Outpatient Medications   Medication      "albuterol (PROAIR HFA/PROVENTIL HFA/VENTOLIN HFA) 108 (90 Base) MCG/ACT inhaler     alcohol swab prep pads     Alcohol Swabs PADS     blood glucose (NO BRAND SPECIFIED) lancets standard     blood glucose (NO BRAND SPECIFIED) test strip     blood glucose monitoring (NO BRAND SPECIFIED) meter device kit     blood glucose monitoring (NO BRAND SPECIFIED) meter device kit     brigatinib (ALUNBRIG) 30 MG TABS tablet     celecoxib (CELEBREX) 100 MG capsule     citalopram (CELEXA) 20 MG tablet     Continuous Blood Gluc Sensor (FREESTYLE IRENE 2 SENSOR) MISC     hydrochlorothiazide (HYDRODIURIL) 25 MG tablet     insulin aspart (NOVOLOG PEN) 100 UNIT/ML pen     insulin glargine (LANTUS PEN) 100 UNIT/ML pen     insulin pen needle (31G X 8 MM) 31G X 8 MM miscellaneous     insulin pen needle (32G X 4 MM) 32G X 4 MM miscellaneous     lisinopril (ZESTRIL) 40 MG tablet     metFORMIN (GLUCOPHAGE XR) 500 MG 24 hr tablet     metFORMIN (GLUCOPHAGE XR) 500 MG 24 hr tablet     methadone (DOLOPHINE) 5 MG tablet     naloxone (NARCAN) 4 MG/0.1ML nasal spray     oxyCODONE (ROXICODONE) 5 MG tablet     oxyCODONE (ROXICODONE) 5 MG tablet     propranolol (INDERAL) 20 MG tablet     [employer] MISC     zolpidem (AMBIEN) 5 MG tablet     dexamethasone (DECADRON) 2 MG tablet     No current facility-administered medications for this visit.     Objective:  There were no vitals taken for this visit.  General: alert and no distress  Psych: Alert and oriented times; coherent speech, normal rate and volume, able to articulate logical thoughts, able to abstract reason, no tangential thoughts, no hallucinations or delusions  Patient's affect is appropriate.   Pulm: Speaking in full sentences, unlabored, no audible wheezes or cough.  The rest of a comprehensive physical examination is deferred due to PHE (public health emergency) video restrictions\"    Labs & Studies: I personally reviewed the following studies:  Most Recent 3 CBC's:  Recent Labs   Lab " Test 05/20/23  0840 05/19/23  0846 05/18/23  0609   WBC 12.5* 14.4* 10.8   HGB 15.1 14.3 13.7   MCV 92 90 90    185 192     Most Recent 3 BMP's:  Recent Labs   Lab Test 05/20/23  1241 05/20/23  1209 05/20/23  1108 05/20/23  0602 05/20/23  0601 05/19/23  0900 05/19/23  0846 05/18/23  1312 05/18/23  1231 05/18/23  0704 05/18/23  0609   NA  --   --   --   --  142  --  136  --   --   --  135*   POTASSIUM  --   --   --   --  3.9  --  5.1  --  4.0  --  3.2*   CHLORIDE  --   --   --   --  105  --  101  --   --   --  99   CO2  --   --   --   --  29  --  28  --   --   --  27   BUN  --   --   --   --  14.0  --  10.9  --   --   --  14.3   CR  --   --   --   --  0.55*  --  0.53*  --   --   --  0.55*   ANIONGAP  --   --   --   --  8  --  7  --   --   --  9   TORY  --   --   --   --  9.1  --  9.0  --   --   --  8.1*   * 143* 143*   < > 93   < > 212*   < >  --    < > 154*    < > = values in this interval not displayed.    Most Recent 2 LFT's:  Recent Labs   Lab Test 05/20/23  0601 05/19/23  0846   AST 15 16   ALT 20 18   ALKPHOS 60 59   BILITOTAL 0.5 0.6    Most Recent TSH and T4:  Recent Labs   Lab Test 09/12/22  1642   TSH 2.56        ASSESSMENT AND PLAN:  Stage IV NSCLC, Rt lung adenocarcinoma with metastasis to pleura, mediastinum , rt pleural effusion and brain diagnosed 1/2022 (AJCC 8th edition)  PD-L1 TPS 2-3% by [place]   NGS [facility] panel-EML4:ALK rearragement; chr2:[id number], chr2:[id number]  NGS [facility]- GNAS R201H, KRAS K5E- No ALK    He began Alectinib 600 mg BID 3/2/22 and unfortunately developed grade 3 myalgias which have improved with lowering the dose 450 mg BID. He was holding drug 4/2/22 to 4/11/22 due to MSSA infection from pleurex which is removed. Resumed at 450 mg BID. Due to ongoing myalgias (Although CK is normal), we dose reduced to 300 mg BID.     In Dec 2022, developed Gr 3 arthralgia, and we stopped drug since 12/20/22. Had unremitting arthalgias, eventully had to starte PO steroids which  led to improvement and resolved. Radiographicaly, he has had a near CR to Rx in the lung, has a residual rt LL lesion.     Began brigatinib 2/22/23 (delayed due to pt hesitancy). Developed severe cough on day 2 so brigatinib was held and cough resolved with in 24-48 hours. He restarted 60 mg daily and upped it to 90 mg.Restging CT showing stable disease in the lung, however, MRI with several contrast enhancement and increase in size of previously treated lesions. His dose was increased to 180 mg daily to address CNS disease.     Unfortunately missed tox follow up then called in feeling very unwell. He was found to have severe hyperglycemia and was admitted. Since discharge 5/20, he has been on 180 mg brigatinib but recently developed myalgias. He had severe myalgias/arthralgias with alectinib too. We discussed trying to manage with pain medication (Working with palliative) but ultimately we may need to hold/dose reduce. I outlined my recommendation to either hold or dose reduce to 120 mg (4 tabs) should his myalgias be too severe in the coming days.     He will see Dr. Persaud for follow up as scheduled later this month. Will confirm no need for chest imaging prior.     #sharp right chest pain:  I suspect this is residual nerve damage from his prior pleurex given its onset and longevity. Hopefully methadone will be helpful for this if it is in fact neuropathic pain.      # Brain mets:  # Radiation necrosis  Per discharge summary, dex 2 mg daily x14 days, completed 6/1. Has follow up MRI later this month    #headache:  No other neuro sx but we will monitor closely. Recommended he try tylenol and ibuprofen    #hyperglycemia:  Acute on chronic--prompting last admission. Reports glucose has been stable. Following with endo.     40 minutes spent on the date of the encounter doing chart review, review of test results, interpretation of tests, patient visit, documentation and discussion with other provider(s), in addition  to 12 minutes spent on the phone with the patient.     Chelsea Villegas CNP on 6/6/2023 at 5:14 PM

## 2023-06-12 ENCOUNTER — TELEPHONE (OUTPATIENT)
Dept: PALLIATIVE CARE | Facility: CLINIC | Age: 43
End: 2023-06-12
Payer: MEDICAID

## 2023-06-12 NOTE — TELEPHONE ENCOUNTER
Patient reports itching with increased dose of Methadadone. Per Dr. Brewer, patient instructed to go back to Methadone 2.5 mg in the am and 5 mg in the pm. Patient reports he already took 5 mg this am, so he will start new dose tomorrow and report back on Thursday how the itching is going.     Patient denies starting any new medications from oncology.    Will update Dr. Brewer.    Patient verbalized understanding and had no further questions.    Ayde Francis RN  Palliative Care Nurse Clinician    [phone] (Direct)  [phone] (Main)  [phone] (Appointment Scheduling)

## 2023-06-13 ENCOUNTER — MYC REFILL (OUTPATIENT)
Dept: FAMILY MEDICINE | Facility: CLINIC | Age: 43
End: 2023-06-13
Payer: MEDICAID

## 2023-06-13 DIAGNOSIS — E11.65 TYPE 2 DIABETES MELLITUS WITH HYPERGLYCEMIA, WITH LONG-TERM CURRENT USE OF INSULIN (H): Primary | ICD-10-CM

## 2023-06-13 DIAGNOSIS — C34.31 MALIGNANT NEOPLASM OF LOWER LOBE OF RIGHT LUNG (H): ICD-10-CM

## 2023-06-13 DIAGNOSIS — Z79.4 TYPE 2 DIABETES MELLITUS WITH HYPERGLYCEMIA, WITH LONG-TERM CURRENT USE OF INSULIN (H): Primary | ICD-10-CM

## 2023-06-13 DIAGNOSIS — I67.89 RADIATION THERAPY INDUCED BRAIN NECROSIS: Primary | ICD-10-CM
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
## 2023-06-14 ENCOUNTER — TELEPHONE (OUTPATIENT)
Dept: ONCOLOGY | Facility: CLINIC | Age: 43
End: 2023-06-14
Payer: MEDICAID

## 2023-06-14 NOTE — ORAL ONC MGMT
Oral Chemotherapy Monitoring Program    Subjective/Objective:  Connor Emerson is a 43 year old male contacted by phone for a follow-up visit for oral chemotherapy.  Connor was asked about his current status with longstanding soreness/myalgia resulting from Alunbrig therapy. Connor reports having dropped his methadone dose down back to the original dosing - 2.5 mg in the am and 5 mg in the pm - per provider instruction due to increased intolerable itching. He has not felt his soreness increase at all since this change, though it is early.  The soreness is persistent, but manageable.  Connor reports utilizing ibuprofen and acetaminophen as well to manage pain, though is unsure of how effective it is.  Connor was told to stop Celebrex upon increasing methadone by his provider, and has not started taking it again as of yet.      Connor reports not having decreased his Alunbrig dose that was an option provided by [facility] due to his myalgia issues - he is still taking six 30 mg tablets daily. He still has 2 full bottles on hand, which is unusual since his last supply from [facility] was 90 tablets around 5/15, which should only last 15 days on his current dose.  Patient may have stored up supply from holds.    Additionally, Connor reports some mild indentations when taking off socks in the evening, indicative of minor (likely grade 1) edema.  When asked about blood pressure monitoring, Connor reports not checking often and will measure blood pressure later today and more frequently going forward.        4/7/2023     8:00 AM 5/9/2023    10:00 AM 5/9/2023    11:00 AM 5/9/2023    12:00 PM 5/30/2023     1:00 PM 6/1/2023     2:00 PM 6/14/2023    10:00 AM   ORAL CHEMOTHERAPY   Assessment Type Refill  Refill  Left Voicemail Monthly Follow up Monthly Follow up   Diagnosis Code Non-Small Cell Lung Cancer Non-Small Cell Lung Cancer Non-Small Cell Lung Cancer Non-Small Cell Lung Cancer Non-Small Cell Lung Cancer Non-Small Cell Lung Cancer  "Non-Small Cell Lung Cancer   Providers Dr Cori Persaud   [facility]   Drug Name Alunbrig (brigatinib) Alunbrig (brigatinib) Alunbrig (brigatinib) Alunbrig (brigatinib) Alunbrig (brigatinib) Alunbrig (brigatinib) Alunbrig (brigatinib)   Dose 90 mg 180 mg 90 mg 180 mg 180 mg 180 mg 180 mg   Current Schedule Daily Daily Daily Daily Daily Daily Daily   Cycle Details Continuous Continuous Continuous Continuous Continuous Continuous Continuous   Start Date of Last Cycle      5/20/2023 5/20/2023   Planned next cycle start date      6/15/2023 6/24/2023   Doses missed in last 2 weeks      0 0   Adherence Assessment      Adherent    Adverse Effects      Myalgias/Arthralgias Myalgias/Arthralgias;Edema   Myalgias/Arthralgias      Grade 2 Grade 2   Pharmacist Intervention(myalgias/arthralgias)      Yes No   Intervention(s)      OTC recommendation    Home BPs       not done   Any new drug interactions?      No No   Is the dose as ordered appropriate for the patient?      Yes Yes       Last PHQ-2 Score on record:       4/6/2023    12:48 PM 6/27/2022     9:00 AM   PHQ-2 ( 1999 [employer])   Q1: Little interest or pleasure in doing things 0 0   Q2: Feeling down, depressed or hopeless 1 0   PHQ-2 Score 1 0   Q1: Little interest or pleasure in doing things Not at all Not at all   Q2: Feeling down, depressed or hopeless Several days Not at all   PHQ-2 Score 1 0       Vitals:  BP:   BP Readings from Last 1 Encounters:   05/20/23 (!) 142/87     Wt Readings from Last 1 Encounters:   06/06/23 93 kg (205 lb)     Estimated body surface area is 2.13 meters squared as calculated from the following:    Height as of 6/6/23: 1.753 m (5' 9\").    Weight as of 6/6/23: 93 kg (205 lb).    Labs:  _  Result Component Current Result Ref Range   Sodium 142 (5/20/2023) 136 - 145 mmol/L     _  Result Component Current Result Ref " Range   Potassium 3.9 (5/20/2023) 3.4 - 5.3 mmol/L     _  Result Component Current Result Ref Range   Calcium 9.1 (5/20/2023) 8.6 - 10.0 mg/dL     _  Result Component Current Result Ref Range   Magnesium 2.4 (H) (5/20/2023) 1.7 - 2.3 mg/dL     _  Result Component Current Result Ref Range   Phosphorus 3.2 (5/20/2023) 2.5 - 4.5 mg/dL     _  Result Component Current Result Ref Range   Albumin 3.8 (5/20/2023) 3.5 - 5.2 g/dL     _  Result Component Current Result Ref Range   Urea Nitrogen 14.0 (5/20/2023) 6.0 - 20.0 mg/dL     _  Result Component Current Result Ref Range   Creatinine 0.55 (L) (5/20/2023) 0.67 - 1.17 mg/dL     _  Result Component Current Result Ref Range   AST 15 (5/20/2023) 10 - 50 U/L     _  Result Component Current Result Ref Range   ALT 20 (5/20/2023) 10 - 50 U/L     _  Result Component Current Result Ref Range   Bilirubin Total 0.5 (5/20/2023) <=1.2 mg/dL     _  Result Component Current Result Ref Range   WBC Count 12.5 (H) (5/20/2023) 4.0 - 11.0 10e3/uL     _  Result Component Current Result Ref Range   Hemoglobin 15.1 (5/20/2023) 13.3 - 17.7 g/dL     _  Result Component Current Result Ref Range   Platelet Count 196 (5/20/2023) 150 - 450 10e3/uL     No results found for ANC within last 30 days.     _  Result Component Current Result Ref Range   Absolute Neutrophils 7.7 (5/20/2023) 1.6 - 8.3 10e3/uL          Assessment/Plan:  Monitor status of myalgia carefully - may increase due to decreased methadone dose.  Change in Alunbrig may be warranted in the future.  Monitor edema.  Check in after [facility] appointment 6/19.    Follow-Up:  Labs 6/16 followed by appt 6/19    Refill Due:  ~6/24    Thank you,  Niranjan Martin, PharmD  Oral Chemotherapy Pharmacist Team

## 2023-06-15 NOTE — TELEPHONE ENCOUNTER
Routing refill request to provider for review/approval because:  Medication is reported/historical    Stefanie MACE RN   [facility]

## 2023-06-16 ENCOUNTER — LAB (OUTPATIENT)
Dept: LAB | Facility: CLINIC | Age: 43
End: 2023-06-16
Attending: NEUROLOGICAL SURGERY
Payer: COMMERCIAL

## 2023-06-16 ENCOUNTER — OFFICE VISIT (OUTPATIENT)
Dept: NEUROSURGERY | Facility: CLINIC | Age: 43
End: 2023-06-16
Payer: COMMERCIAL

## 2023-06-16 ENCOUNTER — HOSPITAL ENCOUNTER (OUTPATIENT)
Dept: MRI IMAGING | Facility: CLINIC | Age: 43
Discharge: HOME | End: 2023-06-16
Attending: NEUROLOGICAL SURGERY
Payer: COMMERCIAL

## 2023-06-16 VITALS — SYSTOLIC BLOOD PRESSURE: 142 MMHG | OXYGEN SATURATION: 100 % | HEART RATE: 81 BPM | DIASTOLIC BLOOD PRESSURE: 87 MMHG

## 2023-06-16 DIAGNOSIS — C34.31 MALIGNANT NEOPLASM OF LOWER LOBE OF RIGHT LUNG (H): ICD-10-CM
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
## 2023-06-16 DIAGNOSIS — C79.31 METASTATIC CANCER TO BRAIN (H): ICD-10-CM

## 2023-06-16 DIAGNOSIS — I67.89 RADIATION THERAPY INDUCED BRAIN NECROSIS: ICD-10-CM

## 2023-06-16 DIAGNOSIS — G93.6 VASOGENIC CEREBRAL EDEMA (H): Primary | ICD-10-CM

## 2023-06-16 DIAGNOSIS — C79.31 METASTASIS TO BRAIN (H): ICD-10-CM

## 2023-06-16 DIAGNOSIS — Z79.899 ENCOUNTER FOR LONG-TERM (CURRENT) USE OF MEDICATIONS: ICD-10-CM

## 2023-06-16 DIAGNOSIS — C79.31 MALIGNANT NEOPLASM METASTATIC TO BRAIN (H): ICD-10-CM

## 2023-06-16 LAB
ALBUMIN SERPL BCG-MCNC: 4.1 G/DL (ref 3.5–5.2)
ALP SERPL-CCNC: 103 U/L (ref 40–129)
ALT SERPL W P-5'-P-CCNC: 20 U/L (ref 0–70)
AMYLASE SERPL-CCNC: 134 U/L (ref 28–100)
ANION GAP SERPL CALCULATED.3IONS-SCNC: 11 MMOL/L (ref 7–15)
AST SERPL W P-5'-P-CCNC: 17 U/L (ref 0–45)
BASOPHILS # BLD AUTO: 0 10E3/UL (ref 0–0.2)
BASOPHILS NFR BLD AUTO: 0 %
BILIRUB SERPL-MCNC: 0.6 MG/DL
BUN SERPL-MCNC: 11.4 MG/DL (ref 6–20)
CALCIUM SERPL-MCNC: 9.1 MG/DL (ref 8.6–10)
CHLORIDE SERPL-SCNC: 104 MMOL/L (ref 98–107)
CK SERPL-CCNC: 51 U/L (ref 39–308)
CREAT SERPL-MCNC: 0.59 MG/DL (ref 0.67–1.17)
DEPRECATED HCO3 PLAS-SCNC: 27 MMOL/L (ref 22–29)
EOSINOPHIL # BLD AUTO: 0.2 10E3/UL (ref 0–0.7)
EOSINOPHIL NFR BLD AUTO: 3 %
ERYTHROCYTE [DISTWIDTH] IN BLOOD BY AUTOMATED COUNT: 13.3 % (ref 10–15)
GFR SERPL CREATININE-BSD FRML MDRD: >90 ML/MIN/1.73M2
GLUCOSE SERPL-MCNC: 331 MG/DL (ref 70–99)
HCT VFR BLD AUTO: 42.6 % (ref 40–53)
HGB BLD-MCNC: 14.8 G/DL (ref 13.3–17.7)
IMM GRANULOCYTES # BLD: 0 10E3/UL
IMM GRANULOCYTES NFR BLD: 1 %
LIPASE SERPL-CCNC: 252 U/L (ref 13–60)
LYMPHOCYTES # BLD AUTO: 1.8 10E3/UL (ref 0.8–5.3)
LYMPHOCYTES NFR BLD AUTO: 27 %
MCH RBC QN AUTO: 31.7 PG (ref 26.5–33)
MCHC RBC AUTO-ENTMCNC: 34.7 G/DL (ref 31.5–36.5)
MCV RBC AUTO: 91 FL (ref 78–100)
MONOCYTES # BLD AUTO: 0.4 10E3/UL (ref 0–1.3)
MONOCYTES NFR BLD AUTO: 6 %
NEUTROPHILS # BLD AUTO: 4.2 10E3/UL (ref 1.6–8.3)
NEUTROPHILS NFR BLD AUTO: 63 %
NRBC # BLD AUTO: 0 10E3/UL
NRBC BLD AUTO-RTO: 0 /100
PHOSPHATE SERPL-MCNC: 3.1 MG/DL (ref 2.5–4.5)
PLATELET # BLD AUTO: 185 10E3/UL (ref 150–450)
POTASSIUM SERPL-SCNC: 3.8 MMOL/L (ref 3.4–5.3)
PROT SERPL-MCNC: 6.3 G/DL (ref 6.4–8.3)
RBC # BLD AUTO: 4.67 10E6/UL (ref 4.4–5.9)
SODIUM SERPL-SCNC: 142 MMOL/L (ref 136–145)
WBC # BLD AUTO: 6.7 10E3/UL (ref 4–11)

## 2023-06-16 PROCEDURE — 99214 OFFICE O/P EST MOD 30 MIN: CPT | Performed by: NEUROLOGICAL SURGERY

## 2023-06-16 PROCEDURE — 85025 COMPLETE CBC W/AUTO DIFF WBC: CPT

## 2023-06-16 PROCEDURE — 82550 ASSAY OF CK (CPK): CPT

## 2023-06-16 PROCEDURE — 36415 COLL VENOUS BLD VENIPUNCTURE: CPT

## 2023-06-16 PROCEDURE — 80053 COMPREHEN METABOLIC PANEL: CPT

## 2023-06-16 PROCEDURE — 255N000002 HC RX 255 OP 636: Performed by: NEUROLOGICAL SURGERY

## 2023-06-16 PROCEDURE — 70553 MRI BRAIN STEM W/O & W/DYE: CPT

## 2023-06-16 PROCEDURE — A9585 GADOBUTROL INJECTION: HCPCS | Performed by: NEUROLOGICAL SURGERY

## 2023-06-16 PROCEDURE — 82150 ASSAY OF AMYLASE: CPT

## 2023-06-16 PROCEDURE — 84100 ASSAY OF PHOSPHORUS: CPT

## 2023-06-16 PROCEDURE — 83690 ASSAY OF LIPASE: CPT

## 2023-06-16 RX ORDER — METFORMIN HCL 500 MG
1000 TABLET, EXTENDED RELEASE 24 HR ORAL
Qty: 180 TABLET | Refills: 0 | Status: SHIPPED | OUTPATIENT
Start: 2023-06-16 | End: 2023-08-16

## 2023-06-16 RX ORDER — DEXAMETHASONE 1 MG
1 TABLET ORAL
Qty: 30 TABLET | Refills: 0 | Status: ON HOLD | OUTPATIENT
Start: 2023-06-16 | End: 2023-06-29

## 2023-06-16 RX ORDER — GADOBUTROL 604.72 MG/ML
9 INJECTION INTRAVENOUS ONCE
Status: COMPLETED | OUTPATIENT
Start: 2023-06-16 | End: 2023-06-16

## 2023-06-16 RX ADMIN — GADOBUTROL 9 ML: 604.72 INJECTION INTRAVENOUS at 09:18

## 2023-06-16 NOTE — PATIENT INSTRUCTIONS
Patient Instructions    Surgery scheduled at [facility] for craniotomy and tumor resection with Stephen Moran MD     Pre-Operative  You will need a Stereotactic MRI 1-3 days before surgery. The Surgery Scheduler will help coordinate this appointment.      Surgical risks: blood clots in the leg or lung, problems urinating, nerve damage, drainage from the incision, infection,stiffness  Pre-operative physical with primary care physician within 30 days of surgical date.   You will spend 2-3 days in hospital   Shower  You will need to shower the night before and morning of surgery using the antimicrobial soap given to you  You can wash your hair using a gently shampoo such as [employer]  Eating/Drinking  Stop all solid foods 8 hours before surgery.  Keep drinking clear liquids until 4 hours before surgery. Clear liquids include water, clear juice, black coffee, or clear tea without milk, Gatorade, clear soda  Medications  Discontinue Aspirin, NSAIDs (Advil/Ibuprofen, Naproxen, Nuprin, Relafen/Nabumetone, Diclofenac, Meloxicam, Aleve, Celebrex) x 7 days prior to surgical date.  May try tylenol for pain 1000 mg three times per day for pain  If you are on chronic pain medication (oxycodone, Percocet, hydrocodone, Vicodin, Norco, Dilaudid, morphine, MS Contin, naltrexone, Suboxone, etc) or have a pain contract we will reach out to your pain clinic to gather your most recent records. Continue obtaining your pain medications from your current provider until surgery. Our team will manage your acute post-op pain in the hospital and during the recovery period. Your pain team will continue to manage your chronic pain. Please contact your provider who manages your pain medications to inform them of your upcoming surgery.    Post-Operative  Incision Care  You may get your incision wet in the shower. Use a gentle shampoo with no fragrance, such as baby shampoo, until incision is completely healed. Allow soap  and water to run over the incision, and gently pat it dry.   Do not soak in a bath, hot tub, or pool until the incision is completely healed (4-6 weeks after surgery)  Avoid coloring your hair or permanent styling until cleared by your surgeon  Pain Management  Dealing with pain  As your body heals, you might feel a stabbing, burning, or aching pain. You may also have some numbness.  Everyone feels pain differently, we may ask you to rate your pain using a pain scale. This will let us know how much pain you feel.   Keep in mind that medicine won't take away all of your pain. It helps to try other ways to relax and ease pain.   Things to help with pain  After surgery, we will give you medicine for your pain. These medications work well, but they can make you drowsy, itchy, or sick to your stomach. If we give you narcotics for pain, try to take the pills with food.   For mild to moderate pain, you can take medication such as Tylenol. These can be used with narcotics, but make sure that your narcotic does not contain Tylenol.   Do NOT drive while taking narcotic pain medication  Do NOT drink alcohol while using any pain medication  You can utilize ice as needed (no longer than 20 minutes at one time)  Refills: please call a few days before you run out.  Activity/Restrictions  We encourage short frequent walks, increasing as tolerated  No driving until you are seen in clinic and cleared by your neurosurgeon or while on narcotics.  If you have had a seizure, you may not drive for at least 3 months according to [place] law.  No strenuous activity  No lifting more than 10 pounds until you are seen in clinic (a gallon of milk weighs approximately 8 pounds)      Post-Op Appointments  You will need to schedule an appointment with one of our nurses for suture/staple removal at one of our clinic locations 2 weeks after your surgery. If you live far away, you may see your primary care doctor for a wound check at 2 weeks.    Follow up in clinic at 6 weeks and again at 12 weeks (3 months).   Please call our clinic at [phone] to schedule an appointment with the Neurosurgery teams.     Resources  Go to the nearest Emergency Room for Evaluation if you develop:  Acute changes in the level of consciousness (increased confusion, memory loss, speech abnormalities)  Any change in hearing or vision  Increased headaches  New onset of seizures.  Please call if you have:  Increased pain, redness, drainage, swelling at your incision  Fevers > 101.5 F degrees  Please call the clinic at [phone] and ask for the NURSE LINE    If you are currently employed, you will need to be off work for 4 weeks for recovery and healing. If you are having a brain biopsy, you will likely be able to return to work sooner.  Please fax any FMLA/short term disability paperwork to [phone] prior to surgery  You may call our clinic when you'd like to return to work and we can provide a work letter  To allow staff adequate time to complete paperwork, please send as soon as possible     [facility]  Phone: [phone]  Fax: [phone]

## 2023-06-16 NOTE — NURSING NOTE
"Patient prescribed methadone by his palliative care provider Dr. Brewer.     Per  :    \"I would continue the current methadone  dose and add on prn oxycodone for the post op pain.  You can also consult the inpatient palliative team at whichever hospital Connor has his surgery and they can assist as well. I can then help in the outpatient space once he is discharged.  Methadone is also available as a liquid so if he has some swallowing difficulties post op with pills that option would be available\".    Update sent to [facility].    "

## 2023-06-16 NOTE — NURSING NOTE
Lon called requesting a refill of the below medication which has been pended for you:     Requested Prescriptions     Pending Prescriptions Disp Refills    ONETOUCH DELICA LANCETS FINE MISC 200 each 1     Sig: Check blood sugar twice daily Dx: 250.00       Last Appointment Date: 1/12/2018  Next Appointment Date: 4/16/2018    No Known Allergies Reviewed pre- and post-operative instructions as outlined in the After Visit Summary/Patient Instructions with patient.   Surgery folder was given to patient    Patient Education Topic: Procedure with Dr. Mroan     Learner(s): Patient    Knowledge Level: Basic    Readiness to Learn: Ready    Method:  Verbal explanation    Outcome: Able to verbalize instructions    Barriers to Learning: No barrier    Covid Testing: n/a     NDI/VERN: n/a    Pain Clinic: Patient's chronic pain is being managed by Dr. Ajith Brewer with Palliative Medicine, RN to contact pain clinic to obtain records. RN to update [facility] team when received.    Patient had the opportunity for questions to be answered. Advised Patient and Significant other/Spouse  to call clinic with any questions/concerns. Gave patient antibacterial soap for pre-surgery skin preparation.

## 2023-06-16 NOTE — LETTER
"    6/16/2023         RE: Connor Emerson  [address]  [place] [address]        Dear Colleague,    Thank you for referring your patient, Connor Emerson, to the [facility]. Please see a copy of my visit note below.    It was a pleasure to see Connor Emerson today in Neurosurgery Clinic. He is a 43 year old male who is here today for follow-up of his brain MRI.  He was hospitalized in May with difficulty with his blood sugars.  The patient reports worsening headaches and more problems with walking.    He is currently weaned off his steroids.    Vitals:    06/16/23 1057   BP: (!) 142/87   Pulse: 81   SpO2: 100%     Estimated body mass index is 30.27 kg/m  as calculated from the following:    Height as of 6/6/23: 1.753 m (5' 9\").    Weight as of 6/6/23: 93 kg (205 lb).  Data Unavailable    Awake and alert.  Bilateral upper and lower extremity strength is 5 out of 5 in all muscle groups  No pronator drift  Symmetrical over the hands and index fingers.    Imaging: Review of his updated MRI shows worsening of the vasogenic cerebral edema around the tumor as well as increasing enhancement.  This is more concerning for recurrence at this point although could certainly also still be radiation necrosis.      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    Plan: Given the significant mass effect from the lesion, I think that surgical intervention at this point would be the best course of action.  I have recommended a right Stealth craniotomy for resection of the lesion.  This will help provide pathologic diagnosis and help relieve some of the mass effect.  I am starting him on a low-dose of steroids, 1 mg daily to help reduce some of the swelling and have reached out to his primary care provider to get assistance with managing his blood sugars.  We will work on scheduling his surgery here in the near future.   "       Again, thank you for allowing me to participate in the care of your patient.        Sincerely,        Stephen Moran MD

## 2023-06-16 NOTE — PROGRESS NOTES
"It was a pleasure to see Connor Emerson today in Neurosurgery Clinic. He is a 43 year old male who is here today for follow-up of his brain MRI.  He was hospitalized in May with difficulty with his blood sugars.  The patient reports worsening headaches and more problems with walking.    He is currently weaned off his steroids.    Vitals:    06/16/23 1057   BP: (!) 142/87   Pulse: 81   SpO2: 100%     Estimated body mass index is 30.27 kg/m  as calculated from the following:    Height as of 6/6/23: 1.753 m (5' 9\").    Weight as of 6/6/23: 93 kg (205 lb).  Data Unavailable    Awake and alert.  Bilateral upper and lower extremity strength is 5 out of 5 in all muscle groups  No pronator drift  Symmetrical over the hands and index fingers.    Imaging: Review of his updated MRI shows worsening of the vasogenic cerebral edema around the tumor as well as increasing enhancement.  This is more concerning for recurrence at this point although could certainly also still be radiation necrosis.      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    Plan: Given the significant mass effect from the lesion, I think that surgical intervention at this point would be the best course of action.  I have recommended a right Stealth craniotomy for resection of the lesion.  This will help provide pathologic diagnosis and help relieve some of the mass effect.  I am starting him on a low-dose of steroids, 1 mg daily to help reduce some of the swelling and have reached out to his primary care provider to get assistance with managing his blood sugars.  We will work on scheduling his surgery here in the near future.     "

## 2023-06-17 ENCOUNTER — MYC REFILL (OUTPATIENT)
Dept: RADIATION ONCOLOGY | Facility: HOSPITAL | Age: 43
End: 2023-06-17
Payer: MEDICAID

## 2023-06-17 ENCOUNTER — MYC REFILL (OUTPATIENT)
Dept: PALLIATIVE CARE | Facility: CLINIC | Age: 43
End: 2023-06-17
Payer: MEDICAID

## 2023-06-17 DIAGNOSIS — G89.3 CANCER ASSOCIATED PAIN: ICD-10-CM

## 2023-06-17 RX ORDER — OXYCODONE HYDROCHLORIDE 5 MG/1
5-10 TABLET ORAL EVERY 4 HOURS PRN
Qty: 60 TABLET | Refills: 0 | Status: CANCELLED | OUTPATIENT
Start: 2023-06-17

## 2023-06-18 NOTE — PROGRESS NOTES
MEDICAL ONCOLOGY FOLLOW UP NOTE    PATIENT NAME: Connor Emerson  ENCOUNTER DATE: 6/19/2023    Care Team  Primary Oncologist: Bassam Persaud MD    REASON FOR CURRENT VISIT: F/u of lung cancer    HISTORY OF PRESENT ILLNESS:  Mr. Connor Emerson is a 42 year old  male who is a non-smoker with PMHx of T2DM, HTN with metastatic NSCLC comes for follow up     Oncologic Hx:    Diagnosis:   Stage IV NSCLC, Rt lung adenocarcinoma with metastasis to pleura, mediastinum , rt pleural effusion and brain diagnosed 1/2022 (AJCC 8th edition)  PD-L1 TPS 2-3% by [place]   NGS [facility] panel-EML4:ALK rearragement  NGS Guardant- GNAS R201H, KRAS K5E- No ALK    Treatment:    2/23/2022- current: Brigatinib. Dose reduced to 60 mg due to cough after two days of 90 mg daily -->back on 90 mg---> increased to 180 mg at last visit due to CNS progression     3/2/22- 12/2022 Alectinib 300 mg BID (Dose reduced to 450 mg BID from 600 mg BID due to grade 3 myalgias 3/21/22, again reduced to 300 mg BID 9/28/22)    )  Past:  2/15/22- GK to 11 brain lesions  6/28/22- Craniotomy, resection  9/28/22-10/26- Bevacizumab for radiation necrosis (stopped due to PE)      Intent of treatment: Palliative    Oncologic course:  1/19/22 to 1/22/22-Admitted to [facility] for 2 week progressive SOB secondary to have large rt sided pleural effusion, needing thoracentesis x2 (1.7L and 2.0 L removed), cytology positive for malignancy, adenocarcinoma.   1/26/22- Rt pleural mass biopsy-Dr. Agrawal--POSITIVE FOR ADENOCARCINOMA CONSISTENT WITH LUNG PRIMARY, admixed with mesothelial hyperplasia and inflammatory infiltrate (+ TTF-1 and CK 7;  negative  p40, calretinin and WT-1. PAX8 immunostain focal +). 4th thoracentesis done simultaneously - 3L approx removed.   2/1/22- PET/CT-Right lower lobe central infiltrative FDG avid 8.2 x 9.6 cm mass representing a primary lung adenocarcinoma. Ipsilateral right perihilar, bilateral pretracheal, subcarinal and superior mediastinal michele  metastases. Contralateral mildly FDG avid few lung nodules are suspicious for contralateral metastasis. At least 3 intracranial metastases in the right frontal lobe, left frontal lobe and left cerebellar hemisphere. Nonspecific mild diffuse bone marrow uptake. Further evaluation with a spine MRI could be considered to rule out early marrow infiltration. This could also be seen with red marrow conversion.  2/5/22-  Brain MRI- At least 9 intracranial metastases as detailed above. The dominant lesions involving the orbital right frontal lobe, the posterior left middle frontal gyrus, anterior right temporal lobe and in the left cerebellar hemisphere have surrounding moderate vasogenic type edema.  2/15/22- Saw Dr. Arango from Rad Onc- Rcd GK to 12 lesion in bran  2/16/22- Pleurex placement   3/2/22- Started Alectinib 600 mg BID  3/21/22- Dose reduced to 450 mg BID due to grade 3 myalgias and fatigue  4/2/22 to 4/5/22- Admitted at [facility] for- Severe sepsis due to MSSA infection of right PleurX catheter s/p removal- He presented with onset of pain at tube site starting 4/1; at arrival was tachycardic with leukocytosis (22.7) and elevated lactic acid (2.9).  CT chest showed fluid and stranding tracking outside the pleural space into chest wall along pleural catheter.  IR was consulted and removed catheter 4/2 with report of pustular drainage and tip culture growing MSSA.  Thoracic Surg was consulted who felt no surgical indication necessary given minimal pleural fluid and lack of any signs of abscess.  Initially treated with broad spectrum coverage for sepsis, narrowed to Ancef once sensitivities returned with plan to transition to cefadroxil for an additional 10 days at discharge per ID. Held drug 4/2 to 4/11 5/2/22- CT CAP- Overall, positive response to therapy with decreased size of right lower lobe and right pleural-based masses, pulmonary metastases, hilar and mediastinal lymphadenopathy. However, a single  right posterior pleural-based mass has slightly increased in size since 2/24/2022. No metastatic disease in the abdomen and pelvis. Right Pleurx catheter has been removed. Trace right pleural effusion and right basilar atelectasis.  5/2/22- Brain MRI- The previously demonstrated brain metastases are mildly diminished in size versus to 2/5/2022. The degree of edema is also diminished but not completely resolved. Probable trace amounts of intralesional bleeding demonstrated on the gradient sequence within the metastases. No definite new metastasis or progressive mass effect. No hydrocephalus or infarct.    6/15/22 to 6/17/22- Admitted at [facility]-with aphasia and word finding difficulty over last few weeks.  He presented to [facility] ED on 6/10 for evaluation of his symptoms. MRI brain showed multiple intracranial metastases, with interval enlargement of the dominant lesion within the left frontal lobe and increased surrounding vasogenic edema with 2 mm rightward shift of the septum pellucidum. Due to his worsening anxiety, he left AMA. His symptoms continued to progress to where he could not write at work so he decided to go to the ED for re-evaluation and treatment. Evaluated by NSGY, Rad Onc (radiaiton necrosis vs tumor progression).  6/16/22- MR Brain (6/16) shows multiple intracranial metastases, with interval enlargement  of the dominant lesion within the left frontal lobe and increased surrounding vasogenic edema with 2 mm rightward shift of the septum pellucidum.  6/16/22- - CT CAP shows slightly decreased size of right lower lobe and right pleural-based masses. No new pulmonary nodules or lymphadenopathy; No evidence of metastatic disease in the abdomen or pelvis.   6/28/22 to 6/30/22- Admitted at [facility]- Elective left Stealth craniotomy with resection of brain tumor due to ongoing symptoms. No intraoperative complications. EBL 50 ml.  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  7/5/22- Ct CAP- Right lower  lobe low-density nodules are not significantly changed. A small left upper lobe pulmonary nodule is also unchanged. Trace pleural fluid on the right has increased slightly. No convincing evidence for metastatic disease in the abdomen or pelvis.  7/18/22 to 7/19/22- Admitted to [facility] for seizure- Reportedly was only taking once Keppra instead of twice daily. Also resumed on dexamethasone 2 mg daily  8/1/22- Brain MRI- Redemonstrated postsurgical changes status post left frontoparietal Craniotomy. Interval increase in size of the dominant ring-enhancing lesion in the left posterior superior frontal lobe with increased moderate surrounding vasogenic edema and local mass effect resulting in narrowing of the supratentorial ventricular system. No significant midline shift/herniation at this time    8/1/22- Dex was increased to 4 mg daily by Dr. Moran    9/1/22- CT Chest- Near resolution of previously seen right pleural nodule. Stable right lower lobe pulmonary nodule    9/28/22- Bevacizumab for radiation necrosis    10/26/22- Bevacizumab     10/26/22- Ct CAP- Stable posterior medial right lower lobe 1.9 x 1.1 cm nodule series 8 image 176. Adjacent stable scarring and atelectasis. The previously noted pleural nodule posteriorly on the right is not currently clearly identified. Stable left upper lobe 0.3 cm nodule image 56    10/26/22- Brain MRI- Overall improved appearance of multiple intracranial metastases with near resolution of associated edema and diminished enhancement and size of multiple residual lesions. No definite new or progressive metastasis.    11/7/22 to 11/9/22- Admitted for PE and HTN urgency- Small pulmonary embolism in the right lower lobe pulmonary artery. started on Lovenox, Brain MRI neg for PRES.    12/29/22- ED visit- bilateral hip pain, pain in shoulders, knuckles, knees, and ankles- holding alectinib since 12/20/22 1/6/23- Ct CAP- Mild groundglass nodularity in the left upper lobe is new since  the previous exam, and may be infectious in etiology. No other significant interval change. Pulmonary nodules are not significantly changed.    1/6/23- Brain MRI- Stable to diminished sequelae of intracranial metastasis and treatment changes. No new or progressive metastasis. No superimposed acute intracranial finding.     2/23/2022- Start Brigatinib. Dose reduced to 60 mg due to cough after two days of 90 mg daily -  3/23/23-Brigatinib  (increase to 90 mg)    4/20/23- CT CAP- Stable- Previously noted mild groundglass nodularity in the left upper lobe has resolved.Pulmonary nodules are unchanged.Trace amount pleural fluid on the right has decreased slightly.    4/20/23- Brain MRI- Possile progression- Largest metastasis within the right frontal lobe has increased in size with worsening peripheral nodular enhancement and worsening vasogenic edema contributing to new right to left midline shift.  Multiple new/enlarging metastases scattered throughout the cerebral hemispheres and cerebellum. Started on dexamethasone by NGS on 4/28/23 5/17/23- presents to clinic with glucose 627, admission to hospital for stability on insulin drip    6/16/23- Brain MRI- Interval increase in size of the necrotic lesion in the anterior aspect of the right frontal lobe from 2.4 x 2.3 x 2.4 cm previously to 3.0 x 3.5 x 2.8 cm on the current study. Differential diagnosis remains radiation necrosis versus tumor progression or a combination of both. Continued surveillance recommended. Mass effect due to slightly increased vasogenic edema surrounding the right frontal lesion resulted in increase of leftward midline shift of the anterior interhemispheric fissure from 0.7 cm previously  to 0.9 cm currently. Interval increase in size in two adjacent metastases at the frontoparietal junction on the left. The remaining scattered intra-axial enhancing nodules are not changed appreciably in size or appearance since the comparison study.No evidence  for acute intracranial pathology.   Dr. Moran- Due to worsening headaches and more problems with walking. Recommended a right Stealth craniotomy for resection of the lesion.  This will help provide pathologic diagnosis and help relieve some of the mass effect.  I am starting him on a low-dose of steroids, 1 mg daily     He works as a maintenance manger for apartment complexes    Interval Hx:  Currently on 1 mg daily of dexamethasone, Checks BS twice: ~300-350 range  Taking insulin: 70 units+corrected  Also takes Anti-HTN meds, today BP is high, claims taking all anti HTN meds, is anxious and in pain, which may be contributing  Still taking brigatinib 180 mg,  Since starting the 1 mg dex, headaches has improved a little bit  Still having moderate to severe arthralgias with 180 mg dose, did not want to reduce  On methadone and oxycodone    Has sharp pleuretic pain, has been there since his pleurex removed really   He is scheduled for surgery on 6/27.    ECOG PS 0    REVIEW OF SYSTEMS: 14 point ROS negative other than the symptoms noted above in the HPI.    Wt Readings from Last 4 Encounters:   06/19/23 98 kg (216 lb)   06/06/23 93 kg (205 lb)   05/17/23 93 kg (205 lb)   05/17/23 93.4 kg (205 lb 14.6 oz)      Review of Systems:  A comprehensive ROS was performed and found to be negative or non-contributory with the exception of that noted in the HPI above.    Past Medical History:  GERD  Hypertension, not on medication  Type 2 diabetes mellitus, not on medications currently, previously on Metformin    Past Surgical History:  Past Surgical History:   Procedure Laterality Date     BRONCHOSCOPY RIGID OR FLEXIBLE W/TRANSENDOSCOPIC ENDOBRONCHIAL ULTRASOUND GUIDED Bilateral 1/26/2022    Procedure: Right BRONCHOSCOPY, FIBEROPTIC, endobronchial ultrasound, pleural biopsy;  Surgeon: Dallin Agrawal MD;  Location: [facility] OR     INJECT BLOCK MEDIAL BRANCH CERVICAL/THORACIC/LUMBAR       INSERT CHEST TUBE Right 2/16/2022    Procedure:  INSERTION, CATHETER, INTERCOSTAL, FOR DRAINAGE;  Surgeon: Dallin Agrawal MD;  Location:  GI     INSERT CHEST TUBE Right 3/9/2022    Procedure: INSERTION, CATHETER, INTERCOSTAL, FOR DRAINAGE;  Surgeon: Sushila Antonio MD;  Location:  GI     IR CHEST TUBE REMOVAL TUNNELED RIGHT  2022     OPTICAL TRACKING SYSTEM CRANIOTOMY, EXCISE TUMOR, COMBINED Left 2022    Procedure: Left stealth craniotomy for tumor resection with motor mapping;  Surgeon: Stephen Moran MD;  Location:  OR     ORTHOPEDIC SURGERY      Ganesh. Rotator cuff repair.     PLEUROSCOPY N/A 2022    Procedure: Pleuroscopy with Pleural Biopsy;  Surgeon: Dallin Agrawal MD;  Location:  OR       Social History:  Lives with wife and 4 kids in [place]. Works as a  for an apartment complex in [place]. Exposure to household chemicals and . No significant exposure to asbestos. No signal exposure to benzene or similar chemicals. No significant smoking history-states that he smoked 1 to 2 cigarettes occasionally per month for about 2 years in college, non-smoking since then. No significant alcohol use history. No other recreational substances. Good support system. Kids are 23, 19, 17 and 13.    Family History  Significant history for cancers on maternal side. Mother  of uterine cancer. 2 maternal uncles have possible metastatic melanoma.    Outpatient Medications:  Current Outpatient Medications   Medication     alcohol swab prep pads     Alcohol Swabs PADS     blood glucose (NO BRAND SPECIFIED) lancets standard     blood glucose (NO BRAND SPECIFIED) test strip     blood glucose monitoring (NO BRAND SPECIFIED) meter device kit     blood glucose monitoring (NO BRAND SPECIFIED) meter device kit     brigatinib (ALUNBRIG) 30 MG TABS tablet     celecoxib (CELEBREX) 100 MG capsule     citalopram (CELEXA) 20 MG tablet     Continuous Blood Gluc Sensor (FREESTYLE IRENE 2 SENSOR) MISC     dexamethasone (DECADRON)  "1 MG tablet     hydrochlorothiazide (HYDRODIURIL) 25 MG tablet     insulin aspart (NOVOLOG PEN) 100 UNIT/ML pen     insulin glargine (LANTUS PEN) 100 UNIT/ML pen     insulin pen needle (31G X 8 MM) 31G X 8 MM miscellaneous     insulin pen needle (32G X 4 MM) 32G X 4 MM miscellaneous     lisinopril (ZESTRIL) 40 MG tablet     metFORMIN (GLUCOPHAGE XR) 500 MG 24 hr tablet     metFORMIN (GLUCOPHAGE XR) 500 MG 24 hr tablet     methadone (DOLOPHINE) 5 MG tablet     oxyCODONE (ROXICODONE) 5 MG tablet     oxyCODONE (ROXICODONE) 5 MG tablet     propranolol (INDERAL) 20 MG tablet     [employer] Container MISC     zolpidem (AMBIEN) 5 MG tablet     albuterol (PROAIR HFA/PROVENTIL HFA/VENTOLIN HFA) 108 (90 Base) MCG/ACT inhaler     dexamethasone (DECADRON) 2 MG tablet     naloxone (NARCAN) 4 MG/0.1ML nasal spray     No current facility-administered medications for this visit.     Objective:  BP (!) 180/115   Pulse 68   Temp 98.8  F (37.1  C) (Oral)   Resp 12   Wt 98 kg (216 lb)   SpO2 98%   BMI 31.90 kg/m    There were no vitals taken for this visit.  General: alert and no distress  Psych: Alert and oriented times; coherent speech, normal rate and volume, able to articulate logical thoughts, able to abstract reason, no tangential thoughts, no hallucinations or delusions  Patient's affect is appropriate.   Pulm: Speaking in full sentences, unlabored, no audible wheezes or cough.  The rest of a comprehensive physical examination is deferred due to PHE (public health emergency) video restrictions\"    Labs & Studies: I personally reviewed the following studies:  Most Recent 3 CBC's:  Recent Labs   Lab Test 06/16/23  0841 05/20/23  0840 05/19/23  0846   WBC 6.7 12.5* 14.4*   HGB 14.8 15.1 14.3   MCV 91 92 90    196 185     Most Recent 3 BMP's:  Recent Labs   Lab Test 06/16/23  0841 05/20/23  1241 05/20/23  1209 05/20/23  0602 05/20/23  0601 05/19/23  0900 05/19/23  0846     --   --   --  142  --  136   POTASSIUM 3.8  " --   --   --  3.9  --  5.1   CHLORIDE 104  --   --   --  105  --  101   CO2 27  --   --   --  29  --  28   BUN 11.4  --   --   --  14.0  --  10.9   CR 0.59*  --   --   --  0.55*  --  0.53*   ANIONGAP 11  --   --   --  8  --  7   TORY 9.1  --   --   --  9.1  --  9.0   * 140* 143*   < > 93   < > 212*    < > = values in this interval not displayed.    Most Recent 2 LFT's:  Recent Labs   Lab Test 06/16/23  0841 05/20/23  0601   AST 17 15   ALT 20 20   ALKPHOS 103 60   BILITOTAL 0.6 0.5    Most Recent TSH and T4:  Recent Labs   Lab Test 09/12/22  1642   TSH 2.56        ASSESSMENT AND PLAN:  Stage IV NSCLC, Rt lung adenocarcinoma with metastasis to pleura, mediastinum , rt pleural effusion and brain diagnosed 1/2022 (AJCC 8th edition)  PD-L1 TPS 2-3% by [place]   NGS [facility] panel-EML4:ALK rearragement; chr2:[id number], chr2:[id number]  NGS [facility]- GNAS R201H, KRAS K5E- No ALK    He began Alectinib 600 mg BID 3/2/22 and unfortunately developed grade 3 myalgias which have improved with lowering the dose 450 mg BID. He was holding drug 4/2/22 to 4/11/22 due to MSSA infection from pleurex which is removed. Resumed at 450 mg BID. Due to ongoing myalgias (Although CK is normal), we dose reduced to 300 mg BID.     In Dec 2022, developed Gr 3 arthralgia, and we stopped drug 12/20/22. Had unremitting arthalgias, eventully had to starte PO steroids which led to improvement and resolved. Radiographicaly, he has had a near CR to Rx in the lung, has a residual rt LL lesion.     Began brigatinib 2/22/23 (delayed due to pt hesitancy). Developed severe cough on day 2 so brigatinib was held and cough resolved with in 24-48 hours. He restarted 60 mg daily and upped it to 90 mg.Restging CT showing stable disease in the lung, however, MRI with several contrast enhancement and increase in size of previously treated lesions. His dose was increased to 180 mg daily to address possible CNS disease progression.     Unfortunately missed tox  follow up then called in feeling very unwell. He was found to have severe hyperglycemia and was admitted. Since discharge 5/20, he has been on 180 mg brigatinib but recently developed myalgias. These are slightly less severe myalgias/arthralgias then with alectinib too. We discussed trying to manage with pain medication (Working with palliative) but ultimately we may need to hold/dose reduce. I outlined my recommendation to either hold or dose reduce to 120 mg (4 tabs) should his myalgias be too severe in the coming days.     He is scheduled for craniotomy  nd resection of enlarging brain mass (see below) on 6/27. We will continue brigatinib till 6/24 and hold it 2 days prior to surgery and begin it immediatelly after surgery provided no complications. If final path shows disease progression,will consider switch to lorlatinib. Pt is agreeable.    Plan  CT July 10   RTC with me after CT    # Brain mets:  # Radiation necrosis  #headache:  No other neuro sx but we will monitor closely.   Baseline Brain MRI with several brain mets, s/p GK to 11-12 brain mets. F/u Brain MRI in June 2022 was showing enlargement of the one of the lesions along with edema, therefore had to undergo craniotomy followed by resection, the final biopsy consistent with radiation necrosis.  Had issues with radiation necrosis and swelling requiring steroids but these were driving up blood sugars drastically. Was on bevacizumab for radiation necrosis --15 mg/kg every 3 weeks.  S/p 2 doses of Bevacixumab, now on hold due HTN urgency and PE. MRI in 4/2023 now showing contrast enhancement of lesions and a dominat lesion int he rt frontal regionw ith edema, several other smaller lesion, un lcear to me how many are new. Short term MRI showing increase in size of the rt frontal lesion, scheduled for resectionby Dr. Moran on 6/27.  Currently on Dex 2 mg daily.  Recommended he try tylenol and ibuprofen for headache.    Stop Brigatinib from 6/25  Can resume  it immediately after surgery if no complications           # Grade 3 arthralgias most likley related to alectinib, unremiittign with tylenol, NSAIDs or xocyodoen,- nearly resolved with steroids  CK and aldolase has been normal   -All joints affected, no morning stiffness, normal ESRa nd CRP  -following with palliative;   -on methadone 2.5 mg in AM and 5 mg at HS; oxycodone 5-10 mg po q 4 hours prn; Celecoxib 100 mg po BID           # PE: provoked by malignancy vs bevacizumab in 11/2022  -New right-sided pleuritic chest pain, tachycardiam, CT showing rt sided sub segmental PE wo   -- on rivaroxiabn 20 mg daily- stopped taking it  -     # G3 diastolic HTN - led to one dose hold of bevacizumab and now discontinued  Todays /115. Asked him to go to ER, but pt attributed this toto anxietya nd pain. Adviesed pt to got o ER if he has any enurologic symptoms, rxplained risk of stroke or other CV problems.   Pt agreed to monitor BP at home.  -WIll ioncrease hydrochlorothiazide to 50 mg and conitnue lisinoprila nd propranol     # Type 2 Diabetes:   #hyperglycemia: Acute on chronic--prompting last admission. Reports glucose has been stable. Following with endo.  Admission 2 times ofr uncontrolled blood sugar ISelf monitoring of blood glucose is not at goal of fasting  mg/dL. Last . On insulin and metformin 500mg ER every day , managing BG with iocnrease insuling requirement with dex   --Started using Mohan 2 continous glucose monitor  --FOllows medication management-   --on  Metformin and insulin         #normocytic anemia: sudden drop to <7, requiring 1 unit blood transfusion. Lab work up unrevealing. Has recovered to baseline  -consider EGD if hgb drops again, risk for GI bleed with chronic steroid use           #right chest/rib pain---> intermittent- improved  Reproducible and intermittent, worse with movement of torso. Unsure of etiology but we agreed to monitor given mild, possibly msk, and no correlating  respiratory or cardiac symptoms.          #MSSA infection, Sepsis at pleurex site- resolved  # Pleural effusion: s/p pleurex palcement  2/16/22. Then on 4/2 right PleurX catheter s/p removal for severe sepsis due to MSSA infection.      #sharp right chest pain:  I suspect this is residual nerve damage from his prior pleurex given its onset and longevity. Hopefully methadone will be helpful for this if it is in fact neuropathic pain.        On the day of service  Chart review: 5 minutes  Visit duration: 30 minutes  Care coordination: 5 minutes    Bassam Persaud MD    Hematology, Oncology and Transplantation

## 2023-06-19 ENCOUNTER — ONCOLOGY VISIT (OUTPATIENT)
Dept: ONCOLOGY | Facility: CLINIC | Age: 43
End: 2023-06-19
Attending: STUDENT IN AN ORGANIZED HEALTH CARE EDUCATION/TRAINING PROGRAM
Payer: COMMERCIAL

## 2023-06-19 VITALS
DIASTOLIC BLOOD PRESSURE: 115 MMHG | BODY MASS INDEX: 31.9 KG/M2 | HEART RATE: 68 BPM | SYSTOLIC BLOOD PRESSURE: 180 MMHG | OXYGEN SATURATION: 98 % | TEMPERATURE: 98.8 F | WEIGHT: 216 LBS | RESPIRATION RATE: 12 BRPM

## 2023-06-19 DIAGNOSIS — C34.31 MALIGNANT NEOPLASM OF LOWER LOBE OF RIGHT LUNG (H): ICD-10-CM
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
## 2023-06-19 DIAGNOSIS — I67.89 RADIATION THERAPY INDUCED BRAIN NECROSIS: Primary | ICD-10-CM

## 2023-06-19 PROCEDURE — G0463 HOSPITAL OUTPT CLINIC VISIT: HCPCS | Performed by: STUDENT IN AN ORGANIZED HEALTH CARE EDUCATION/TRAINING PROGRAM

## 2023-06-19 PROCEDURE — 99215 OFFICE O/P EST HI 40 MIN: CPT | Performed by: STUDENT IN AN ORGANIZED HEALTH CARE EDUCATION/TRAINING PROGRAM

## 2023-06-19 ASSESSMENT — PAIN SCALES - GENERAL: PAINLEVEL: NO PAIN (0)

## 2023-06-19 NOTE — PATIENT INSTRUCTIONS
Stop Brigatinib from 6/25  Can resume it immediately after surgery if no complications  CT July 10  RTC with me after CT

## 2023-06-19 NOTE — TELEPHONE ENCOUNTER
Received [employer] message from patient requesting refill of oxycodone.     Last refill: 5/27/23  Last office visit: 6/6/23  Scheduled for follow up 6/29/23     Will route request to MD for review.     Reviewed [place]  Report.

## 2023-06-19 NOTE — NURSING NOTE
"Oncology Rooming Note    June 19, 2023 2:04 PM   Connor Emerson is a 43 year old male who presents for:    Chief Complaint   Patient presents with     Oncology Clinic Visit     Non-small cell lung ca     Initial Vitals: BP (!) 180/115   Pulse 68   Temp 98.8  F (37.1  C) (Oral)   Resp 12   Wt 98 kg (216 lb)   SpO2 98%   BMI 31.90 kg/m   Estimated body mass index is 31.9 kg/m  as calculated from the following:    Height as of 6/6/23: 1.753 m (5' 9\").    Weight as of this encounter: 98 kg (216 lb). Body surface area is 2.18 meters squared.  No Pain (0) Comment: Data Unavailable   No LMP for male patient.  Allergies reviewed: Yes  Medications reviewed: Yes    Medications: Medication refills not needed today.  Pharmacy name entered into EPIC:    [facility]  [facility] - [place], [place] - [address]  [facility] [place], [place] - [facility] [address]   [facility], [address] [id number] [place] - [address] [facility] AT [place] [address] & [facility] #[id number] [place], [place] - [address] AT [facility] & [address] [facility]ARMAC[facility] [place], [place] - [address]    Clinical concerns:  none      Joselin Nielsen"

## 2023-06-19 NOTE — LETTER
6/19/2023         RE: Connor Emerson  [address]  [place] [facility] [address]        Dear Colleague,    Thank you for referring your patient, Connor Emerson, to the [facility]. Please see a copy of my visit note below.    MEDICAL ONCOLOGY FOLLOW UP NOTE    PATIENT NAME: Connor Emerson  ENCOUNTER DATE: 6/19/2023    Care Team  Primary Oncologist: Bassam Persaud MD    REASON FOR CURRENT VISIT: F/u of lung cancer    HISTORY OF PRESENT ILLNESS:  Mr. Connor Emerson is a 42 year old  male who is a non-smoker with PMHx of T2DM, HTN with metastatic NSCLC comes for follow up     Oncologic Hx:    Diagnosis:   Stage IV NSCLC, Rt lung adenocarcinoma with metastasis to pleura, mediastinum , rt pleural effusion and brain diagnosed 1/2022 (AJCC 8th edition)  PD-L1 TPS 2-3% by [place]   NGS [facility] panel-EML4:ALK rearragement  NGS Guardant- GNAS R201H, KRAS K5E- No ALK    Treatment:    2/23/2022- current: Brigatinib. Dose reduced to 60 mg due to cough after two days of 90 mg daily -->back on 90 mg---> increased to 180 mg at last visit due to CNS progression     3/2/22- 12/2022 Alectinib 300 mg BID (Dose reduced to 450 mg BID from 600 mg BID due to grade 3 myalgias 3/21/22, again reduced to 300 mg BID 9/28/22)    )  Past:  2/15/22- GK to 11 brain lesions  6/28/22- Craniotomy, resection  9/28/22-10/26- Bevacizumab for radiation necrosis (stopped due to PE)      Intent of treatment: Palliative    Oncologic course:  1/19/22 to 1/22/22-Admitted to [facility] for 2 week progressive SOB secondary to have large rt sided pleural effusion, needing thoracentesis x2 (1.7L and 2.0 L removed), cytology positive for malignancy, adenocarcinoma.   1/26/22- Rt pleural mass biopsy-Dr. Agrawal--POSITIVE FOR ADENOCARCINOMA CONSISTENT WITH LUNG PRIMARY, admixed with mesothelial hyperplasia and inflammatory infiltrate (+ TTF-1 and CK 7;  negative  p40, calretinin and WT-1. PAX8 immunostain focal +). 4th thoracentesis done  simultaneously - 3L approx removed.   2/1/22- PET/CT-Right lower lobe central infiltrative FDG avid 8.2 x 9.6 cm mass representing a primary lung adenocarcinoma. Ipsilateral right perihilar, bilateral pretracheal, subcarinal and superior mediastinal michele metastases. Contralateral mildly FDG avid few lung nodules are suspicious for contralateral metastasis. At least 3 intracranial metastases in the right frontal lobe, left frontal lobe and left cerebellar hemisphere. Nonspecific mild diffuse bone marrow uptake. Further evaluation with a spine MRI could be considered to rule out early marrow infiltration. This could also be seen with red marrow conversion.  2/5/22-  Brain MRI- At least 9 intracranial metastases as detailed above. The dominant lesions involving the orbital right frontal lobe, the posterior left middle frontal gyrus, anterior right temporal lobe and in the left cerebellar hemisphere have surrounding moderate vasogenic type edema.  2/15/22- Saw Dr. Arango from [facility]- Rcd GK to 12 lesion in bran  2/16/22- Pleurex placement   3/2/22- Started Alectinib 600 mg BID  3/21/22- Dose reduced to 450 mg BID due to grade 3 myalgias and fatigue  4/2/22 to 4/5/22- Admitted at [facility] for- Severe sepsis due to MSSA infection of right PleurX catheter s/p removal- He presented with onset of pain at tube site starting 4/1; at arrival was tachycardic with leukocytosis (22.7) and elevated lactic acid (2.9).  CT chest showed fluid and stranding tracking outside the pleural space into chest wall along pleural catheter.  IR was consulted and removed catheter 4/2 with report of pustular drainage and tip culture growing MSSA.  Thoracic Surg was consulted who felt no surgical indication necessary given minimal pleural fluid and lack of any signs of abscess.  Initially treated with broad spectrum coverage for sepsis, narrowed to Ancef once sensitivities returned with plan to transition to cefadroxil for an additional 10  days at discharge per ID. Held drug 4/2 to 4/11 5/2/22- CT CAP- Overall, positive response to therapy with decreased size of right lower lobe and right pleural-based masses, pulmonary metastases, hilar and mediastinal lymphadenopathy. However, a single right posterior pleural-based mass has slightly increased in size since 2/24/2022. No metastatic disease in the abdomen and pelvis. Right Pleurx catheter has been removed. Trace right pleural effusion and right basilar atelectasis.  5/2/22- Brain MRI- The previously demonstrated brain metastases are mildly diminished in size versus to 2/5/2022. The degree of edema is also diminished but not completely resolved. Probable trace amounts of intralesional bleeding demonstrated on the gradient sequence within the metastases. No definite new metastasis or progressive mass effect. No hydrocephalus or infarct.    6/15/22 to 6/17/22- Admitted at [facility]-with aphasia and word finding difficulty over last few weeks.  He presented to [facility] ED on 6/10 for evaluation of his symptoms. MRI brain showed multiple intracranial metastases, with interval enlargement of the dominant lesion within the left frontal lobe and increased surrounding vasogenic edema with 2 mm rightward shift of the septum pellucidum. Due to his worsening anxiety, he left AMA. His symptoms continued to progress to where he could not write at work so he decided to go to the ED for re-evaluation and treatment. Evaluated by NSGY, Rad Onc (radiaiton necrosis vs tumor progression).  6/16/22- MR Brain (6/16) shows multiple intracranial metastases, with interval enlargement  of the dominant lesion within the left frontal lobe and increased surrounding vasogenic edema with 2 mm rightward shift of the septum pellucidum.  6/16/22- - CT CAP shows slightly decreased size of right lower lobe and right pleural-based masses. No new pulmonary nodules or lymphadenopathy; No evidence of metastatic disease in the abdomen or pelvis.    6/28/22 to 6/30/22- Admitted at [facility]- Elective left Stealth craniotomy with resection of brain tumor due to ongoing symptoms. No intraoperative complications. EBL 50 ml.  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  7/5/22- Ct CAP- Right lower lobe low-density nodules are not significantly changed. A small left upper lobe pulmonary nodule is also unchanged. Trace pleural fluid on the right has increased slightly. No convincing evidence for metastatic disease in the abdomen or pelvis.  7/18/22 to 7/19/22- Admitted to [facility] for seizure- Reportedly was only taking once Keppra instead of twice daily. Also resumed on dexamethasone 2 mg daily  8/1/22- Brain MRI- Redemonstrated postsurgical changes status post left frontoparietal Craniotomy. Interval increase in size of the dominant ring-enhancing lesion in the left posterior superior frontal lobe with increased moderate surrounding vasogenic edema and local mass effect resulting in narrowing of the supratentorial ventricular system. No significant midline shift/herniation at this time    8/1/22- Dex was increased to 4 mg daily by Dr. Moran    9/1/22- CT Chest- Near resolution of previously seen right pleural nodule. Stable right lower lobe pulmonary nodule    9/28/22- Bevacizumab for radiation necrosis    10/26/22- Bevacizumab     10/26/22- Ct CAP- Stable posterior medial right lower lobe 1.9 x 1.1 cm nodule series 8 image 176. Adjacent stable scarring and atelectasis. The previously noted pleural nodule posteriorly on the right is not currently clearly identified. Stable left upper lobe 0.3 cm nodule image 56    10/26/22- Brain MRI- Overall improved appearance of multiple intracranial metastases with near resolution of associated edema and diminished enhancement and size of multiple residual lesions. No definite new or progressive metastasis.    11/7/22 to 11/9/22- Admitted for PE and HTN urgency- Small pulmonary embolism in the right lower lobe pulmonary  artery. started on Lovenox, Brain MRI neg for PRES.    12/29/22- ED visit- bilateral hip pain, pain in shoulders, knuckles, knees, and ankles- holding alectinib since 12/20/22 1/6/23- Ct CAP- Mild groundglass nodularity in the left upper lobe is new since the previous exam, and may be infectious in etiology. No other significant interval change. Pulmonary nodules are not significantly changed.    1/6/23- Brain MRI- Stable to diminished sequelae of intracranial metastasis and treatment changes. No new or progressive metastasis. No superimposed acute intracranial finding.     2/23/2022- Start Brigatinib. Dose reduced to 60 mg due to cough after two days of 90 mg daily -  3/23/23-Brigatinib  (increase to 90 mg)    4/20/23- CT CAP- Stable- Previously noted mild groundglass nodularity in the left upper lobe has resolved.Pulmonary nodules are unchanged.Trace amount pleural fluid on the right has decreased slightly.    4/20/23- Brain MRI- Possile progression- Largest metastasis within the right frontal lobe has increased in size with worsening peripheral nodular enhancement and worsening vasogenic edema contributing to new right to left midline shift.  Multiple new/enlarging metastases scattered throughout the cerebral hemispheres and cerebellum. Started on dexamethasone by NGS on 4/28/23 5/17/23- presents to clinic with glucose 627, admission to hospital for stability on insulin drip    6/16/23- Brain MRI- Interval increase in size of the necrotic lesion in the anterior aspect of the right frontal lobe from 2.4 x 2.3 x 2.4 cm previously to 3.0 x 3.5 x 2.8 cm on the current study. Differential diagnosis remains radiation necrosis versus tumor progression or a combination of both. Continued surveillance recommended. Mass effect due to slightly increased vasogenic edema surrounding the right frontal lesion resulted in increase of leftward midline shift of the anterior interhemispheric fissure from 0.7 cm previously  to  0.9 cm currently. Interval increase in size in two adjacent metastases at the frontoparietal junction on the left. The remaining scattered intra-axial enhancing nodules are not changed appreciably in size or appearance since the comparison study.No evidence for acute intracranial pathology.   Dr. Moran- Due to worsening headaches and more problems with walking. Recommended a right Stealth craniotomy for resection of the lesion.  This will help provide pathologic diagnosis and help relieve some of the mass effect.  I am starting him on a low-dose of steroids, 1 mg daily     He works as a maintenance manger for apartment complexes    Interval Hx:  Currently on 1 mg daily of dexamethasone, Checks BS twice: ~300-350 range  Taking insulin: 70 units+corrected  Also takes Anti-HTN meds, today BP is high, claims taking all anti HTN meds, is anxious and in pain, which may be contributing  Still taking brigatinib 180 mg,  Since starting the 1 mg dex, headaches has improved a little bit  Still having moderate to severe arthralgias with 180 mg dose, did not want to reduce  On methadone and oxycodone    Has sharp pleuretic pain, has been there since his pleurex removed really   He is scheduled for surgery on 6/27.    ECOG PS 0    REVIEW OF SYSTEMS: 14 point ROS negative other than the symptoms noted above in the HPI.    Wt Readings from Last 4 Encounters:   06/19/23 98 kg (216 lb)   06/06/23 93 kg (205 lb)   05/17/23 93 kg (205 lb)   05/17/23 93.4 kg (205 lb 14.6 oz)      Review of Systems:  A comprehensive ROS was performed and found to be negative or non-contributory with the exception of that noted in the HPI above.    Past Medical History:  GERD  Hypertension, not on medication  Type 2 diabetes mellitus, not on medications currently, previously on Metformin    Past Surgical History:  Past Surgical History:   Procedure Laterality Date    BRONCHOSCOPY RIGID OR FLEXIBLE W/TRANSENDOSCOPIC ENDOBRONCHIAL ULTRASOUND GUIDED Bilateral  2022    Procedure: Right BRONCHOSCOPY, FIBEROPTIC, endobronchial ultrasound, pleural biopsy;  Surgeon: Dallin Agrawal MD;  Location: [facility] OR    INJECT BLOCK MEDIAL BRANCH CERVICAL/THORACIC/LUMBAR      INSERT CHEST TUBE Right 2022    Procedure: INSERTION, CATHETER, INTERCOSTAL, FOR DRAINAGE;  Surgeon: Dallin Agrawal MD;  Location: [facility] GI    INSERT CHEST TUBE Right 3/9/2022    Procedure: INSERTION, CATHETER, INTERCOSTAL, FOR DRAINAGE;  Surgeon: Sushila Antonio MD;  Location: [facility] GI    IR CHEST TUBE REMOVAL TUNNELED RIGHT  2022    OPTICAL TRACKING SYSTEM CRANIOTOMY, EXCISE TUMOR, COMBINED Left 2022    Procedure: Left stealth craniotomy for tumor resection with motor mapping;  Surgeon: Stephen Moran MD;  Location:  OR    ORTHOPEDIC SURGERY      Ganesh. Rotator cuff repair.    PLEUROSCOPY N/A 2022    Procedure: Pleuroscopy with Pleural Biopsy;  Surgeon: Dallin Agrawal MD;  Location:  OR       Social History:  Lives with wife and 4 kids in [place]. Works as a  for an [employer] complex in [place]. Exposure to household chemicals and . No significant exposure to asbestos. No signal exposure to benzene or similar chemicals. No significant smoking history-states that he smoked 1 to 2 cigarettes occasionally per month for about 2 years in college, non-smoking since then. No significant alcohol use history. No other recreational substances. Good support system. Kids are 23, 19, 17 and 13.    Family History  Significant history for cancers on maternal side. Mother  of uterine cancer. 2 maternal uncles have possible metastatic melanoma.    Outpatient Medications:  Current Outpatient Medications   Medication    alcohol swab prep pads    Alcohol Swabs PADS    blood glucose (NO BRAND SPECIFIED) lancets standard    blood glucose (NO BRAND SPECIFIED) test strip    blood glucose monitoring (NO BRAND SPECIFIED) meter device kit    blood glucose monitoring (NO BRAND  "SPECIFIED) meter device kit    brigatinib (ALUNBRIG) 30 MG TABS tablet    celecoxib (CELEBREX) 100 MG capsule    citalopram (CELEXA) 20 MG tablet    Continuous Blood Gluc Sensor (FREESTYLE IRENE 2 SENSOR) MISC    dexamethasone (DECADRON) 1 MG tablet    hydrochlorothiazide (HYDRODIURIL) 25 MG tablet    insulin aspart (NOVOLOG PEN) 100 UNIT/ML pen    insulin glargine (LANTUS PEN) 100 UNIT/ML pen    insulin pen needle (31G X 8 MM) 31G X 8 MM miscellaneous    insulin pen needle (32G X 4 MM) 32G X 4 MM miscellaneous    lisinopril (ZESTRIL) 40 MG tablet    metFORMIN (GLUCOPHAGE XR) 500 MG 24 hr tablet    metFORMIN (GLUCOPHAGE XR) 500 MG 24 hr tablet    methadone (DOLOPHINE) 5 MG tablet    oxyCODONE (ROXICODONE) 5 MG tablet    oxyCODONE (ROXICODONE) 5 MG tablet    propranolol (INDERAL) 20 MG tablet    [employer] Container MISC    zolpidem (AMBIEN) 5 MG tablet    albuterol (PROAIR HFA/PROVENTIL HFA/VENTOLIN HFA) 108 (90 Base) MCG/ACT inhaler    dexamethasone (DECADRON) 2 MG tablet    naloxone (NARCAN) 4 MG/0.1ML nasal spray     No current facility-administered medications for this visit.     Objective:  BP (!) 180/115   Pulse 68   Temp 98.8  F (37.1  C) (Oral)   Resp 12   Wt 98 kg (216 lb)   SpO2 98%   BMI 31.90 kg/m    There were no vitals taken for this visit.  General: alert and no distress  Psych: Alert and oriented times; coherent speech, normal rate and volume, able to articulate logical thoughts, able to abstract reason, no tangential thoughts, no hallucinations or delusions  Patient's affect is appropriate.   Pulm: Speaking in full sentences, unlabored, no audible wheezes or cough.  The rest of a comprehensive physical examination is deferred due to PHE (public health emergency) video restrictions\"    Labs & Studies: I personally reviewed the following studies:  Most Recent 3 CBC's:  Recent Labs   Lab Test 06/16/23  0841 05/20/23  0840 05/19/23  0846   WBC 6.7 12.5* 14.4*   HGB 14.8 15.1 14.3   MCV 91 92 90   PLT " 185 196 185     Most Recent 3 BMP's:  Recent Labs   Lab Test 06/16/23  0841 05/20/23  1241 05/20/23  1209 05/20/23  0602 05/20/23  0601 05/19/23  0900 05/19/23  0846     --   --   --  142  --  136   POTASSIUM 3.8  --   --   --  3.9  --  5.1   CHLORIDE 104  --   --   --  105  --  101   CO2 27  --   --   --  29  --  28   BUN 11.4  --   --   --  14.0  --  10.9   CR 0.59*  --   --   --  0.55*  --  0.53*   ANIONGAP 11  --   --   --  8  --  7   TORY 9.1  --   --   --  9.1  --  9.0   * 140* 143*   < > 93   < > 212*    < > = values in this interval not displayed.    Most Recent 2 LFT's:  Recent Labs   Lab Test 06/16/23  0841 05/20/23  0601   AST 17 15   ALT 20 20   ALKPHOS 103 60   BILITOTAL 0.6 0.5    Most Recent TSH and T4:  Recent Labs   Lab Test 09/12/22  1642   TSH 2.56        ASSESSMENT AND PLAN:  Stage IV NSCLC, Rt lung adenocarcinoma with metastasis to pleura, mediastinum , rt pleural effusion and brain diagnosed 1/2022 (AJCC 8th edition)  PD-L1 TPS 2-3% by [place]   NGS [facility] panel-EML4:ALK rearragement; chr2:[id number], chr2:[id number]  NGS Guardant- GNAS R201H, KRAS K5E- No ALK    He began Alectinib 600 mg BID 3/2/22 and unfortunately developed grade 3 myalgias which have improved with lowering the dose 450 mg BID. He was holding drug 4/2/22 to 4/11/22 due to MSSA infection from pleurex which is removed. Resumed at 450 mg BID. Due to ongoing myalgias (Although CK is normal), we dose reduced to 300 mg BID.     In Dec 2022, developed Gr 3 arthralgia, and we stopped drug 12/20/22. Had unremitting arthalgias, eventully had to starte PO steroids which led to improvement and resolved. Radiographicaly, he has had a near CR to Rx in the lung, has a residual rt LL lesion.     Began brigatinib 2/22/23 (delayed due to pt hesitancy). Developed severe cough on day 2 so brigatinib was held and cough resolved with in 24-48 hours. He restarted 60 mg daily and upped it to 90 mg.Restging CT showing stable disease in  the lung, however, MRI with several contrast enhancement and increase in size of previously treated lesions. His dose was increased to 180 mg daily to address possible CNS disease progression.     Unfortunately missed tox follow up then called in feeling very unwell. He was found to have severe hyperglycemia and was admitted. Since discharge 5/20, he has been on 180 mg brigatinib but recently developed myalgias. These are slightly less severe myalgias/arthralgias then with alectinib too. We discussed trying to manage with pain medication (Working with palliative) but ultimately we may need to hold/dose reduce. I outlined my recommendation to either hold or dose reduce to 120 mg (4 tabs) should his myalgias be too severe in the coming days.     He is scheduled for craniotomy  nd resection of enlarging brain mass (see below) on 6/27. We will continue brigatinib till 6/24 and hold it 2 days prior to surgery and begin it immediatelly after surgery provided no complications. If final path shows disease progression,will consider switch to lorlatinib. Pt is agreeable.    Plan  CT July 10   RTC with me after CT    # Brain mets:  # Radiation necrosis  #headache:  No other neuro sx but we will monitor closely.   Baseline Brain MRI with several brain mets, s/p GK to 11-12 brain mets. F/u Brain MRI in June 2022 was showing enlargement of the one of the lesions along with edema, therefore had to undergo craniotomy followed by resection, the final biopsy consistent with radiation necrosis.  Had issues with radiation necrosis and swelling requiring steroids but these were driving up blood sugars drastically. Was on bevacizumab for radiation necrosis --15 mg/kg every 3 weeks.  S/p 2 doses of Bevacixumab, now on hold due HTN urgency and PE. MRI in 4/2023 now showing contrast enhancement of lesions and a dominat lesion int he rt frontal regionw ith edema, several other smaller lesion, un lcear to me how many are new. Short term MRI  showing increase in size of the rt frontal lesion, scheduled for resectionby Dr. Moran on 6/27.  Currently on Dex 2 mg daily.  Recommended he try tylenol and ibuprofen for headache.    Stop Brigatinib from 6/25  Can resume it immediately after surgery if no complications           # Grade 3 arthralgias most likley related to alectinib, unremiittign with tylenol, NSAIDs or xocyodoen,- nearly resolved with steroids  CK and aldolase has been normal   -All joints affected, no morning stiffness, normal ESRa nd CRP  -following with palliative;   -on methadone 2.5 mg in AM and 5 mg at HS; oxycodone 5-10 mg po q 4 hours prn; Celecoxib 100 mg po BID           # PE: provoked by malignancy vs bevacizumab in 11/2022  -New right-sided pleuritic chest pain, tachycardiam, CT showing rt sided sub segmental PE wo   -- on rivaroxiabn 20 mg daily- stopped taking it  -     # G3 diastolic HTN - led to one dose hold of bevacizumab and now discontinued  Todays /115. Asked him to go to ER, but pt attributed this toto anxietya nd pain. Adviesed pt to got o ER if he has any enurologic symptoms, rxplained risk of stroke or other CV problems.   Pt agreed to monitor BP at home.  -WIll ioncrease hydrochlorothiazide to 50 mg and conitnue lisinoprila nd propranol     # Type 2 Diabetes:   #hyperglycemia: Acute on chronic--prompting last admission. Reports glucose has been stable. Following with endo.  Admission 2 times ofr uncontrolled blood sugar ISelf monitoring of blood glucose is not at goal of fasting  mg/dL. Last . On insulin and metformin 500mg ER every day , managing BG with iocnrease insuling requirement with dex   --Started using Mohan 2 continous glucose monitor  --FOllows medication management-   --on  Metformin and insulin         #normocytic anemia: sudden drop to <7, requiring 1 unit blood transfusion. Lab work up unrevealing. Has recovered to baseline  -consider EGD if hgb drops again, risk for GI bleed with chronic  steroid use           #right chest/rib pain---> intermittent- improved  Reproducible and intermittent, worse with movement of torso. Unsure of etiology but we agreed to monitor given mild, possibly msk, and no correlating respiratory or cardiac symptoms.          #MSSA infection, Sepsis at pleurex site- resolved  # Pleural effusion: s/p pleurex palcement  2/16/22. Then on 4/2 right PleurX catheter s/p removal for severe sepsis due to MSSA infection.      #sharp right chest pain:  I suspect this is residual nerve damage from his prior pleurex given its onset and longevity. Hopefully methadone will be helpful for this if it is in fact neuropathic pain.        On the day of service  Chart review: 5 minutes  Visit duration: 30 minutes  Care coordination: 5 minutes    Bassam Persaud MD    Hematology, Oncology and Transplantation

## 2023-06-19 NOTE — TELEPHONE ENCOUNTER
Received [employer] message from patient requesting refill of methadone.     Last refill: 4/11/23  Last office visit: 6/6/23  Scheduled for follow up 6/29/23     Will route request to MD for review.     Reviewed [place]  Report.

## 2023-06-20 RX ORDER — METHADONE HYDROCHLORIDE 5 MG/1
TABLET ORAL
Qty: 45 TABLET | Refills: 0 | Status: SHIPPED | OUTPATIENT
Start: 2023-06-20 | End: 2023-08-06

## 2023-06-20 RX ORDER — OXYCODONE HYDROCHLORIDE 5 MG/1
5-10 TABLET ORAL EVERY 4 HOURS PRN
Qty: 60 TABLET | Refills: 0 | Status: SHIPPED | OUTPATIENT
Start: 2023-06-20 | End: 2023-06-26

## 2023-06-21 ENCOUNTER — TELEPHONE (OUTPATIENT)
Dept: NEUROSURGERY | Facility: CLINIC | Age: 43
End: 2023-06-21
Payer: MEDICAID

## 2023-06-21 ENCOUNTER — TELEPHONE (OUTPATIENT)
Dept: ONCOLOGY | Facility: CLINIC | Age: 43
End: 2023-06-21
Payer: MEDICAID

## 2023-06-21 NOTE — ORAL ONC MGMT
Oral Chemotherapy Monitoring Program     Placed call to patient in follow up of oral chemotherapy. Called Connor to see what dose he is currently taking and where he is at in terms of refill needs. He states he is still taking to 180 mg but it was previously discussed with him he could potentially decrease to 120 mg due to adverse effects. His next Rx was written for 180 mg tablets, his preference would be to continue with the 30 mg tablets so he would still have the option to dose decrease. He said he still has ~ 2 weeks of meds on hand. Will request MD to sign updated Rx using 30 mg tablets and release to pharmacy when able. Will update when response received.     Erna Rashid, PharmD, [facility]  Oral Chemotherapy Monitoring Program  [facility]  [phone]  June 21, 2023

## 2023-06-21 NOTE — TELEPHONE ENCOUNTER
POST OP MEDICATION INFORMATION:     I would continue the current methadone  dose and add on prn oxycodone for the post op pain.  You can also consult the inpatient palliative team at whichever hospital Connor has his surgery and they can assist as well. I can then help in the outpatient space once he is discharged.  Methadone is also available as a liquid so if he has some swallowing difficulties post op with pills that option would be available.     ------------------------------------------------------------------------------------------------------------------------------------------------    From: Bassam Persaud MD   Sent: 6/19/2023   2:49 PM CDT   To: Stephen Moran MD   Subject: brigatinib                                       IMPORTANT INFORMATION:   I asked him to hold brigatinib from Sunday 25 June, ok to begin the day after surgery if no complications.

## 2023-06-22 ENCOUNTER — DOCUMENTATION ONLY (OUTPATIENT)
Dept: NEUROSURGERY | Facility: CLINIC | Age: 43
End: 2023-06-22

## 2023-06-22 ENCOUNTER — TELEPHONE (OUTPATIENT)
Dept: FAMILY MEDICINE | Facility: CLINIC | Age: 43
End: 2023-06-22

## 2023-06-22 NOTE — TELEPHONE ENCOUNTER
Tried to call the pt back.  Unable to leave a message as his mailbox is full.      Mychart sent advising pt should let us know if his blood sugars are > 400.

## 2023-06-22 NOTE — TELEPHONE ENCOUNTER
Radha Shetty Ra, APRN CNP  P Rm Triage; Stephen Moran MD  Absolutely.  I will forward it on to our nurses to give him a call.     Thanks,   BLANKA           Previous Messages       ----- Message -----   From: Stephen Moran MD   Sent: 6/16/2023  11:13 AM CDT   To: JERRY Ailcia Ra, CNP   Subject: starting dexamethasone                           Connor Garcia's scan looks worse and we are talking about surgery. I am starting him on as low a dose of dexamethsone as I can, but I think he will feel better with that.     Can you follow up with him about his sugars to make sure that is going ok?     Thanks

## 2023-06-22 NOTE — PROGRESS NOTES
----- Message -----   From: Stephen Moran MD   Sent: 6/20/2023   7:38 AM CDT   To: Bassam Persaud MD; Marva Coronel PA-C   Subject: RE: brigatinib                                   Thanks. Sounds good.   ----- Message -----   From: Bassam Persaud MD   Sent: 6/19/2023   2:49 PM CDT   To: Stephen Moran MD   Subject: brigatinib                                       Suha Singleton,     I asked him to hold brigatinib from Sunday 25 June, ok to begin the day after surgery if no complications.

## 2023-06-22 NOTE — TELEPHONE ENCOUNTER
Called the pt.      His blood sugar this morning was 300.  He said that is a pretty medium number for him.  He said 300 is about his norm.  He says he is ok.  He said he sees a specialist for his diabetes, not seeing who that is.  He says it is at the [facility].      He needs to schedule a pre-op exam.  Radha is out of the office the rest of the week.  Pt is having surgery on 6/27/23.  Hiram King is the only one with an appt tomorrow.  Pt scheduled with Hiram King.    Will forward to Radha Shetty.

## 2023-06-23 ENCOUNTER — OFFICE VISIT (OUTPATIENT)
Dept: FAMILY MEDICINE | Facility: CLINIC | Age: 43
End: 2023-06-23
Payer: COMMERCIAL

## 2023-06-23 VITALS
OXYGEN SATURATION: 95 % | HEART RATE: 62 BPM | SYSTOLIC BLOOD PRESSURE: 138 MMHG | TEMPERATURE: 97.7 F | RESPIRATION RATE: 22 BRPM | WEIGHT: 218.2 LBS | HEIGHT: 69 IN | BODY MASS INDEX: 32.32 KG/M2 | DIASTOLIC BLOOD PRESSURE: 90 MMHG

## 2023-06-23 DIAGNOSIS — C79.31 MALIGNANT NEOPLASM METASTATIC TO BRAIN (H): ICD-10-CM

## 2023-06-23 DIAGNOSIS — I67.89 RADIATION THERAPY INDUCED BRAIN NECROSIS: ICD-10-CM

## 2023-06-23 DIAGNOSIS — Z79.4 TYPE 2 DIABETES MELLITUS WITHOUT COMPLICATION, WITH LONG-TERM CURRENT USE OF INSULIN (H): ICD-10-CM

## 2023-06-23 DIAGNOSIS — E11.9 TYPE 2 DIABETES MELLITUS WITHOUT COMPLICATION, WITH LONG-TERM CURRENT USE OF INSULIN (H): ICD-10-CM

## 2023-06-23 DIAGNOSIS — Z01.818 PREOP GENERAL PHYSICAL EXAM: Primary | ICD-10-CM
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
## 2023-06-23 DIAGNOSIS — C34.31 MALIGNANT NEOPLASM OF LOWER LOBE OF RIGHT LUNG (H): ICD-10-CM

## 2023-06-23 PROCEDURE — 99214 OFFICE O/P EST MOD 30 MIN: CPT | Performed by: PHYSICIAN ASSISTANT

## 2023-06-23 NOTE — PROGRESS NOTES
[facility]  [address] [address] [address]  Phone: [phone]  Primary Provider: Radha Shetty Ra  Pre-op Performing Provider: JOHN LEDESMA      PREOPERATIVE EVALUATION:  Today's date: 6/23/2023    Connor Emerson is a 43 year old male who presents for a preoperative evaluation.      6/23/2023     9:55 AM   Additional Questions   Roomed by Mckenzie GUARDADO   Accompanied by Self         6/23/2023     9:55 AM   Patient Reported Additional Medications   Patient reports taking the following new medications None     Surgical Information:  Surgery/Procedure: Craniotomy and resection of tumor  Surgery Location: [facility]  Surgeon: Dr. Moran  Surgery Date: 6/27/2023  Time of Surgery: 3:00 PM  Where patient plans to recover: At home with family  Fax number for surgical facility: Note does not need to be faxed, will be available electronically in Epic.    Assessment & Plan     The proposed surgical procedure is considered HIGH risk.    1. Preop general physical exam  2. Brain metastasis  3. Radiation therapy induced brain necrosis  4. Malignant neoplasm of lower lobe of right lung (H)  Connor is healthy for his upcoming procedure without further work up    5. Type 2 diabetes mellitus without complication, with long-term current use of insulin (H)  This has suffered from poor control lately with the addition of dexamethasone. We reviewed insulin and oral hypoglycemic use surrounding his pre-operative time.       Antiplatelet or Anticoagulation Medication Instructions:   - Patient is on no antiplatelet or anticoagulation medications.    Additional Medication Instructions:  See AVS for other medication hold times    RECOMMENDATION:  APPROVAL GIVEN to proceed with proposed procedure, without further diagnostic evaluation.      Subjective       HPI related to upcoming procedure: Brain metastasis (primary lung) with increasing right sided tumor        6/23/2023     9:50 AM   Preop  Questions   1. Have you ever had a heart attack or stroke? No   2. Have you ever had surgery on your heart or blood vessels, such as a stent placement, a coronary artery bypass, or surgery on an artery in your head, neck, heart, or legs? No   3. Do you have chest pain with activity? No   4. Do you have a history of  heart failure? No   5. Do you currently have a cold, bronchitis or symptoms of other infection? No   6. Do you have a cough, shortness of breath, or wheezing? No   7. Do you or anyone in your family have previous history of blood clots? No   8. Do you or does anyone in your family have a serious bleeding problem such as prolonged bleeding following surgeries or cuts? No   9. Have you ever had problems with anemia or been told to take iron pills? No   10. Have you had any abnormal blood loss such as black, tarry or bloody stools? No   11. Have you ever had a blood transfusion? No   12. Are you willing to have a blood transfusion if it is medically needed before, during, or after your surgery? Yes   13. Have you or any of your relatives ever had problems with anesthesia? No   14. Do you have sleep apnea, excessive snoring or daytime drowsiness? No   15. Do you have any artifical heart valves or other implanted medical devices like a pacemaker, defibrillator, or continuous glucose monitor? No   16. Do you have artificial joints? No   17. Are you allergic to latex? No       Health Care Directive:  Patient does not have a Health Care Directive or Living Will: Has prior code order on hand    Preoperative Review of :   reviewed - controlled substances reflected in medication list.      Status of Chronic Conditions:  DIABETES - Patient has a longstanding history of DiabetesType Type II . Patient is being treated with oral agents and insulin injections and denies significant side effects. Control has been only fair - worsened with dexamethasone. Complicating factors include but are not limited to:  hypertension and obesity .     HYPERTENSION - Patient has longstanding history of HTN , currently denies any symptoms referable to elevated blood pressure. Specifically denies chest pain, palpitations, dyspnea, orthopnea, PND or peripheral edema. Blood pressure readings have not been in normal range, though not significantly out of range. Current medication regimen is as listed below. Patient denies any side effects of medication.       Review of Systems  Constitutional, neuro, ENT, endocrine, pulmonary, cardiac, gastrointestinal, genitourinary, musculoskeletal, integument and psychiatric systems are negative, except as otherwise noted.    Patient Active Problem List    Diagnosis Date Noted     Dehydration 05/17/2023     Priority: Medium     Malignant neoplasm metastatic to both lungs (H) 05/17/2023     Priority: Medium     Medical marijuana use 09/13/2022     Priority: Medium     Other specified nutritional anemias 09/01/2022     Priority: Medium     Radiation therapy induced brain necrosis 08/17/2022     Priority: Medium     Seizure (H) 07/18/2022     Priority: Medium     Hyperglycemia 07/18/2022     Priority: Medium     Surgery, elective 06/28/2022     Priority: Medium     Complication of chest tube, initial encounter 04/02/2022     Priority: Medium     Severe sepsis (H) 04/02/2022     Priority: Medium     Brain metastasis 02/18/2022     Priority: Medium     Malignant neoplasm of lower lobe of right lung (H) 02/15/2022     Priority: Medium     Pleural effusion on right 01/19/2022     Priority: Medium     Morbid obesity (H) 11/27/2017     Priority: Medium     Cellulitis of hand, right 04/27/2017     Priority: Medium     Lateral epicondylitis of right elbow 11/11/2016     Priority: Medium     Adjustment disorder with mixed anxiety and depressed mood 04/23/2016     Priority: Medium     Type 2 diabetes mellitus without complication (H) 10/27/2015     Priority: Medium     Intractable chronic migraine without aura  01/08/2015     Priority: Medium     Problem list name updated by automated process. Provider to review       GERD (gastroesophageal reflux disease) 12/02/2013     Priority: Medium     Atypical chest pain 12/02/2013     Priority: Medium     Anxiety 07/09/2013     Priority: Medium     HTN, goal below 140/90 07/02/2013     Priority: Medium     Obesity 11/26/2012     Priority: Medium     Insomnia 02/21/2012     Priority: Medium     Low back pain 02/04/2012     Priority: Medium     Diagnosis updated by automated process. Provider to review and confirm.       CARDIOVASCULAR SCREENING; LDL GOAL LESS THAN 160 10/28/2011     Priority: Medium      Past Medical History:   Diagnosis Date     Atypical chest pain 12/02/2013     Cancer (H)      Complication of anesthesia      Diabetes (H)      GERD (gastroesophageal reflux disease) 12/02/2013     History of pulmonary embolism      HTN (hypertension) 05/14/2012     HTN, goal below 140/90 07/02/2013     Insomnia 02/21/2012     Mediastinal lymphadenopathy      Migraine headache 07/02/2013     Migraine with aura, without mention of intractable migraine without mention of status migrainosus      Pneumonia      Past Surgical History:   Procedure Laterality Date     BRONCHOSCOPY RIGID OR FLEXIBLE W/TRANSENDOSCOPIC ENDOBRONCHIAL ULTRASOUND GUIDED Bilateral 1/26/2022    Procedure: Right BRONCHOSCOPY, FIBEROPTIC, endobronchial ultrasound, pleural biopsy;  Surgeon: Dallin Agrawal MD;  Location: [facility] OR     INJECT BLOCK MEDIAL BRANCH CERVICAL/THORACIC/LUMBAR       INSERT CHEST TUBE Right 2/16/2022    Procedure: INSERTION, CATHETER, INTERCOSTAL, FOR DRAINAGE;  Surgeon: Dallin Agrawal MD;  Location: [facility] GI     INSERT CHEST TUBE Right 3/9/2022    Procedure: INSERTION, CATHETER, INTERCOSTAL, FOR DRAINAGE;  Surgeon: Sushila Antonio MD;  Location: [facility] GI     IR CHEST TUBE REMOVAL TUNNELED RIGHT  4/2/2022     OPTICAL TRACKING SYSTEM CRANIOTOMY, EXCISE TUMOR, COMBINED Left 6/28/2022    Procedure:  Left stealth craniotomy for tumor resection with motor mapping;  Surgeon: Stephen Moran MD;  Location:  OR     ORTHOPEDIC SURGERY      Ganesh. Rotator cuff repair.     PLEUROSCOPY N/A 1/26/2022    Procedure: Pleuroscopy with Pleural Biopsy;  Surgeon: Dallin Agrawal MD;  Location: [facility] OR     Current Outpatient Medications   Medication Sig Dispense Refill     albuterol (PROAIR HFA/PROVENTIL HFA/VENTOLIN HFA) 108 (90 Base) MCG/ACT inhaler Inhale 2 puffs into the lungs every 6 hours as needed for shortness of breath, wheezing or cough 18 g 0     alcohol swab prep pads Use to swab area of injection/jabier as directed. 100 each 3     Alcohol Swabs PADS Use to swab the area of the injection or jabier as directed. Per insurance coverage 100 each 0     blood glucose (NO BRAND SPECIFIED) lancets standard To use to test glucose level in the blood Use to test blood sugar  4  times daily as directed. To accompany glucose monitor brands per insurance coverage. 100 each 3     blood glucose (NO BRAND SPECIFIED) test strip To use to test glucose level in the blood Use to test blood sugar  4 times daily as directed. To accompany glucose monitor brands per insurance coverage. 100 strip 3     blood glucose monitoring (NO BRAND SPECIFIED) meter device kit Use as directed. Per insurance coverage 1 kit 0     blood glucose monitoring (NO BRAND SPECIFIED) meter device kit Use to test blood sugar 2 times daily or as directed. Preferred blood glucose meter OR supplies to accompany: Blood Glucose Monitor Brands: per insurance. 1 kit 0     brigatinib (ALUNBRIG) 30 MG TABS tablet Take 6 tablets (180 mg) by mouth daily 90 tablet 0     citalopram (CELEXA) 20 MG tablet Take 1 tablet (20 mg) by mouth every evening 90 tablet 1     Continuous Blood Gluc Sensor (FREESTYLE IRENE 2 SENSOR) [facility] 1 each every 14 days Apply as directed per  instructions 2 each 11     dexamethasone (DECADRON) 1 MG tablet Take 1 tablet (1 mg) by mouth daily  (with breakfast) 30 tablet 0     hydrochlorothiazide (HYDRODIURIL) 25 MG tablet Take 1 tablet (25 mg) by mouth daily 90 tablet 1     insulin aspart (NOVOLOG PEN) 100 UNIT/ML pen Inject 0-40 Units Subcutaneous 3 times daily (before meals) Per discharge instructions sliding scale 45 mL 2     insulin glargine (LANTUS PEN) 100 UNIT/ML pen Inject 70 Units Subcutaneous At Bedtime 30 mL 2     insulin pen needle (31G X 8 MM) 31G X 8 MM miscellaneous Use one pen needles daily or as directed. 100 each 0     insulin pen needle (32G X 4 MM) 32G X 4 MM miscellaneous Use as directed by provider. Per insurance coverage 100 each 0     lisinopril (ZESTRIL) 40 MG tablet Take 1 tablet (40 mg) by mouth daily 90 tablet 1     metFORMIN (GLUCOPHAGE XR) 500 MG 24 hr tablet Take 2 tablets (1,000 mg) by mouth daily (with dinner) 180 tablet 0     metFORMIN (GLUCOPHAGE XR) 500 MG 24 hr tablet Take 1 tablet (500 mg) by mouth daily (with breakfast) 270 tablet 1     methadone (DOLOPHINE) 5 MG tablet Take 0.5 tablets (2.5 mg) by mouth every morning AND 1 tablet (5 mg) At Bedtime. 45 tablet 0     naloxone (NARCAN) 4 MG/0.1ML nasal spray Spray 1 spray (4 mg) into one nostril alternating nostrils as needed for opioid reversal every 2-3 minutes until assistance arrives 0.2 mL 3     oxyCODONE (ROXICODONE) 5 MG tablet Take 1-2 tablets (5-10 mg) by mouth every 4 hours as needed for severe pain 60 tablet 0     oxyCODONE (ROXICODONE) 5 MG tablet Take 1-2 tablets (5-10 mg) by mouth every 4 hours as needed for moderate to severe pain 60 tablet 0     propranolol (INDERAL) 20 MG tablet Take 1 tablet (20 mg) by mouth 2 times daily 180 tablet 1     Sharps Container MISC Use as directed to dispose of needles, lancets and other sharps 1 each 0     zolpidem (AMBIEN) 5 MG tablet Take 1 tablet (5 mg) by mouth nightly as needed for sleep 20 tablet 0       Allergies   Allergen Reactions     Vicodin [Hydrocodone-Acetaminophen] Nausea and Vomiting and GI Disturbance     "    Social History     Tobacco Use     Smoking status: Never     Passive exposure: Never     Smokeless tobacco: Never   Substance Use Topics     Alcohol use: Yes     Alcohol/week: 0.0 standard drinks of alcohol     Comment: social       History   Drug Use No         Objective     BP (!) 138/90   Pulse 62   Temp 97.7  F (36.5  C) (Oral)   Resp 22   Ht 1.753 m (5' 9.02\")   Wt 99 kg (218 lb 3.2 oz)   SpO2 95%   BMI 32.21 kg/m      Physical Exam    GENERAL APPEARANCE: healthy, alert and no distress     EYES: EOMI,  PERRL     HENT: ear canals and TM's normal and nose and mouth without ulcers or lesions     RESP: lungs clear to auscultation - no rales, rhonchi or wheezes     CV: regular rates and rhythm without ectopy     ABDOMEN:  soft, nontender, no HSM or masses and bowel sounds normal     MS: No peripheral edema      PSYCH: mentation appears normal. and affect normal/bright    Recent Labs   Lab Test 06/16/23  0841 05/20/23  0840 05/20/23  0601 05/17/23  1023 04/06/23  1400 11/08/22  0618 11/08/22  0127 07/18/22  0203 06/28/22  1109 04/02/22  1115 04/02/22  1114   HGB 14.8 15.1  --    < > 15.2   < >  --    < > 15.4   < >  --     196  --    < > 240   < >  --    < > 231   < >  --    INR  --   --   --   --   --   --   --   --  0.90  --  1.03     --  142   < > 145   < >  --    < >  --    < > 133   POTASSIUM 3.8  --  3.9   < > 4.0   < >  --    < > 3.8   < > 3.7   CR 0.59*  --  0.55*   < > 0.70   < >  --    < >  --    < > 0.54*   A1C  --   --   --   --  8.3*  --  9.5*  --   --    < >  --     < > = values in this interval not displayed.        Diagnostics:  No labs were ordered during this visit.   No EKG this visit, completed in the last 90 days.    Revised Cardiac Risk Index (RCRI):  The patient has the following serious cardiovascular risks for perioperative complications:   - Diabetes Mellitus (on Insulin) = 1 point     RCRI Interpretation: 1 point: Class II (low risk - 0.9% complication rate)       "     Signed Electronically by: Jayden King PA-C  Copy of this evaluation report is provided to requesting physician.

## 2023-06-23 NOTE — PATIENT INSTRUCTIONS
[phone] for [facility] pharmacist Ester    For informational purposes only. Not to replace the advice of your health care provider. Copyright   ,  [facility]. All rights reserved. Clinically reviewed by Frances Leon MD. [email or web address] REV .  Preparing for Your Surgery  Getting started  A nurse will call you to review your health history and instructions. They will give you an arrival time based on your scheduled surgery time. Please be ready to share:  Your doctor's clinic name and phone number  Your medical, surgical, and anesthesia history  A list of allergies and sensitivities  A list of medicines, including herbal treatments and over-the-counter drugs  Whether the patient has a legal guardian (ask how to send us the papers in advance)  Please tell us if you're pregnant--or if there's any chance you might be pregnant. Some surgeries may injure a fetus (unborn baby), so they require a pregnancy test. Surgeries that are safe for a fetus don't always need a test, and you can choose whether to have one.   If you have a child who's having surgery, please ask for a copy of Preparing for Your Child's Surgery.    Preparing for surgery  Within 10 to 30 days of surgery: Have a pre-op exam (sometimes called an H&P, or History and Physical). This can be done at a clinic or pre-operative center.  If you're having a , you may not need this exam. Talk to your care team.  At your pre-op exam, talk to your care team about all medicines you take. If you need to stop any medicines before surgery, ask when to start taking them again.  We do this for your safety. Many medicines can make you bleed too much during surgery. Some change how well surgery (anesthesia) drugs work.  Call your insurance company to let them know you're having surgery. (If you don't have insurance, call [phone].)  Call your clinic if there's any change in your health. This includes signs of a cold or flu (sore  throat, runny nose, cough, rash, fever). It also includes a scrape or scratch near the surgery site.  If you have questions on the day of surgery, call your hospital or surgery center.  Eating and drinking guidelines  For your safety: Unless your surgeon tells you otherwise, follow the guidelines below.  Eat and drink as usual until 8 hours before you arrive for surgery. After that, no food or milk.  Drink clear liquids until 2 hours before you arrive. These are liquids you can see through, like water, Gatorade, and Propel Water. They also include plain black coffee and tea (no cream or milk), candy, and breath mints. You can spit out gum when you arrive.  If you drink alcohol: Stop drinking it the night before surgery.  If your care team tells you to take medicine on the morning of surgery, it's okay to take it with a sip of water.  Preventing infection  Shower or bathe the night before and morning of your surgery. Follow the instructions your clinic gave you. (If no instructions, use regular soap.)  Don't shave or clip hair near your surgery site. We'll remove the hair if needed.  Don't smoke or vape the morning of surgery. You may chew nicotine gum up to 2 hours before surgery. A nicotine patch is okay.  Note: Some surgeries require you to completely quit smoking and nicotine. Check with your surgeon.  Your care team will make every effort to keep you safe from infection. We will:  Clean our hands often with soap and water (or an alcohol-based hand rub).  Clean the skin at your surgery site with a special soap that kills germs.  Give you a special gown to keep you warm. (Cold raises the risk of infection.)  Wear special hair covers, masks, gowns and gloves during surgery.  Give antibiotic medicine, if prescribed. Not all surgeries need antibiotics.  What to bring on the day of surgery  Photo ID and insurance card  Copy of your health care directive, if you have one  Glasses and hearing aids (bring cases)  You  can't wear contacts during surgery  Inhaler and eye drops, if you use them (tell us about these when you arrive)  CPAP machine or breathing device, if you use them  A few personal items, if spending the night  If you have . . .  A pacemaker, ICD (cardiac defibrillator) or other implant: Bring the ID card.  An implanted stimulator: Bring the remote control.  A legal guardian: Bring a copy of the certified (court-stamped) guardianship papers.  Please remove any jewelry, including body piercings. Leave jewelry and other valuables at home.  If you're going home the day of surgery  You must have a responsible adult drive you home. They should stay with you overnight as well.  If you don't have someone to stay with you, and you aren't safe to go home alone, we may keep you overnight. Insurance often won't pay for this.  After surgery  If it's hard to control your pain or you need more pain medicine, please call your surgeon's office.  Questions?   If you have any questions for your care team, list them here: _________________________________________________________________________________________________________________________________________________________________________ ____________________________________ ____________________________________ ____________________________________    How to Take Your Medication Before Surgery  - Take all of your medications before surgery except as noted below  - HOLD (do not take) your METFORMIN on the morning of surgery.  - STOP taking all vitamins and herbal supplements 14 days before surgery.  - Take only 56 units of Lantus the night prior to surgery; Do NOT take NOVOLOG the morning of surgery  - STOP Brigatinib after Saturday's doses  - Hold hydrochlorothiazide the morning of surgery

## 2023-06-24 ENCOUNTER — HOSPITAL ENCOUNTER (OUTPATIENT)
Dept: MRI IMAGING | Facility: CLINIC | Age: 43
Discharge: HOME | End: 2023-06-24
Attending: NEUROLOGICAL SURGERY | Admitting: NEUROLOGICAL SURGERY
Payer: COMMERCIAL

## 2023-06-24 DIAGNOSIS — C79.31 METASTATIC CANCER TO BRAIN (H): ICD-10-CM

## 2023-06-24 PROCEDURE — 70552 MRI BRAIN STEM W/DYE: CPT

## 2023-06-24 PROCEDURE — A9585 GADOBUTROL INJECTION: HCPCS | Performed by: NEUROLOGICAL SURGERY

## 2023-06-24 PROCEDURE — 255N000002 HC RX 255 OP 636: Performed by: NEUROLOGICAL SURGERY

## 2023-06-24 RX ORDER — GADOBUTROL 604.72 MG/ML
20 INJECTION INTRAVENOUS ONCE
Status: COMPLETED | OUTPATIENT
Start: 2023-06-24 | End: 2023-06-24

## 2023-06-24 RX ADMIN — GADOBUTROL 20 ML: 604.72 INJECTION INTRAVENOUS at 13:07

## 2023-06-26 NOTE — PROGRESS NOTES
PTA medications updated by Medication Scribe prior to surgery via phone call with patient (last doses completed by Nurse)     Medication history sources: Patient's family/friend (SPOUSE), Surescripts and H&P  In the past week, patient estimated taking medication this percent of the time: Greater than 90%      Significant changes made to the medication list:  None      Additional medication history information:   None    Medication reconciliation completed by provider prior to medication history? No    Time spent in this activity: 40 MINUTES    The information provided in this note is only as accurate as the sources available at the time of update(s)      Prior to Admission medications    Medication Sig Last Dose Taking? Auth Provider Long Term End Date   albuterol (PROAIR HFA/PROVENTIL HFA/VENTOLIN HFA) 108 (90 Base) MCG/ACT inhaler Inhale 2 puffs into the lungs every 6 hours as needed for shortness of breath, wheezing or cough  at PRN Yes Radha Shetty Ra, APRN CNP Yes    brigatinib (ALUNBRIG) 30 MG TABS tablet Take 6 tablets (180 mg) by mouth daily  at AM Yes Paula Jacobo MD     citalopram (CELEXA) 20 MG tablet Take 1 tablet (20 mg) by mouth every evening  at AM Yes Radha Shetty Ra, APRN CNP Yes    dexamethasone (DECADRON) 1 MG tablet Take 1 tablet (1 mg) by mouth daily (with breakfast)  at AM Yes Stephen Moran MD Yes    hydrochlorothiazide (HYDRODIURIL) 25 MG tablet Take 1 tablet (25 mg) by mouth daily  at AM Yes aRdha Shetty Ra, APRN CNP Yes    insulin aspart (NOVOLOG PEN) 100 UNIT/ML pen Inject 0-40 Units Subcutaneous 3 times daily (before meals) Per discharge instructions sliding scale  at AM Yes Paula Jacobo MD Yes    insulin glargine (LANTUS PEN) 100 UNIT/ML pen Inject 70 Units Subcutaneous At Bedtime  at AM Yes Paula Jacobo MD Yes    lisinopril (ZESTRIL) 40 MG tablet Take 1 tablet (40 mg) by mouth daily  at AM Yes Radha Shetty Ra, APRN CNP Yes    metFORMIN (GLUCOPHAGE XR)  500 MG 24 hr tablet Take 2 tablets (1,000 mg) by mouth daily (with dinner)  at PM Yes Radha Shetty Ra, APRN CNP Yes    metFORMIN (GLUCOPHAGE XR) 500 MG 24 hr tablet Take 1 tablet (500 mg) by mouth daily (with breakfast)  at AM Yes Paula Jacobo MD Yes    methadone (DOLOPHINE) 5 MG tablet Take 0.5 tablets (2.5 mg) by mouth every morning AND 1 tablet (5 mg) At Bedtime.  at PM Yes Ajith Brewer MD     naloxone (NARCAN) 4 MG/0.1ML nasal spray Spray 1 spray (4 mg) into one nostril alternating nostrils as needed for opioid reversal every 2-3 minutes until assistance arrives  Yes Ajith Brewer MD Yes    oxyCODONE (ROXICODONE) 5 MG tablet Take 1-2 tablets (5-10 mg) by mouth every 4 hours as needed for moderate to severe pain  at PRN Yes Ajith Brewer MD     propranolol (INDERAL) 20 MG tablet Take 1 tablet (20 mg) by mouth 2 times daily  at AM Yes Radha Shetty Ra, JERRY SHULTZ Yes    zolpidem (AMBIEN) 5 MG tablet Take 1 tablet (5 mg) by mouth nightly as needed for sleep  at PRN Yes Ajith Brewer MD     alcohol swab prep pads Use to swab area of injection/jabier as directed.   Radha Shetty Ra, APRN CNP     Alcohol Swabs PADS Use to swab the area of the injection or jabier as directed. Per insurance coverage   Paula Jacobo MD     blood glucose (NO BRAND SPECIFIED) lancets standard To use to test glucose level in the blood Use to test blood sugar  4  times daily as directed. To accompany glucose monitor brands per insurance coverage.   Paula Jacobo MD     blood glucose (NO BRAND SPECIFIED) test strip To use to test glucose level in the blood Use to test blood sugar  4 times daily as directed. To accompany glucose monitor brands per insurance coverage.   Paula Jacobo MD     blood glucose monitoring (NO BRAND SPECIFIED) meter device kit Use as directed. Per insurance coverage   Paula Jacobo MD     blood glucose monitoring (NO BRAND SPECIFIED) meter device kit Use to test blood sugar 2  times daily or as directed. Preferred blood glucose meter OR supplies to accompany: Blood Glucose Monitor Brands: per insurance.   Radha Shetty Ra, APRN CNP     Continuous Blood Gluc Sensor (FREESTYLE IRENE 2 SENSOR) MISC 1 each every 14 days Apply as directed per  instructions   Radha Shetty Ra, APRN CNP     insulin pen needle (31G X 8 MM) 31G X 8 MM miscellaneous Use one pen needles daily or as directed.   Jayden King PA-C     insulin pen needle (32G X 4 MM) 32G X 4 MM miscellaneous Use as directed by provider. Per insurance coverage   Paula Jacobo MD     Sharps Container MISC Use as directed to dispose of needles, lancets and other sharps   Paula Jacobo MD

## 2023-06-27 ENCOUNTER — HOSPITAL ENCOUNTER (INPATIENT)
Facility: CLINIC | Age: 43
LOS: 2 days | Discharge: HOME | End: 2023-06-29
Attending: NEUROLOGICAL SURGERY | Admitting: NEUROLOGICAL SURGERY
Payer: COMMERCIAL

## 2023-06-27 ENCOUNTER — ANESTHESIA EVENT (OUTPATIENT)
Dept: SURGERY | Facility: CLINIC | Age: 43
End: 2023-06-27
Payer: COMMERCIAL

## 2023-06-27 ENCOUNTER — ANESTHESIA (OUTPATIENT)
Dept: SURGERY | Facility: CLINIC | Age: 43
End: 2023-06-27
Payer: COMMERCIAL

## 2023-06-27 ENCOUNTER — TELEPHONE (OUTPATIENT)
Dept: ONCOLOGY | Facility: CLINIC | Age: 43
End: 2023-06-27

## 2023-06-27 DIAGNOSIS — D49.6 BRAIN TUMOR (H): Primary | ICD-10-CM
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
## 2023-06-27 DIAGNOSIS — C34.31 MALIGNANT NEOPLASM OF LOWER LOBE OF RIGHT LUNG (H): ICD-10-CM

## 2023-06-27 DIAGNOSIS — I67.89 RADIATION THERAPY INDUCED BRAIN NECROSIS: Primary | ICD-10-CM

## 2023-06-27 LAB
ABO/RH(D): NORMAL
ANTIBODY SCREEN: NEGATIVE
APTT PPP: 23 SECONDS (ref 22–38)
BASOPHILS # BLD AUTO: 0.1 10E3/UL (ref 0–0.2)
BASOPHILS NFR BLD AUTO: 1 %
CREAT SERPL-MCNC: 0.67 MG/DL (ref 0.67–1.17)
EOSINOPHIL # BLD AUTO: 0.2 10E3/UL (ref 0–0.7)
EOSINOPHIL NFR BLD AUTO: 2 %
ERYTHROCYTE [DISTWIDTH] IN BLOOD BY AUTOMATED COUNT: 12.6 % (ref 10–15)
GFR SERPL CREATININE-BSD FRML MDRD: >90 ML/MIN/1.73M2
GLUCOSE BLDC GLUCOMTR-MCNC: 127 MG/DL (ref 70–99)
GLUCOSE BLDC GLUCOMTR-MCNC: 150 MG/DL (ref 70–99)
GLUCOSE BLDC GLUCOMTR-MCNC: 178 MG/DL (ref 70–99)
GLUCOSE BLDC GLUCOMTR-MCNC: 204 MG/DL (ref 70–99)
GLUCOSE SERPL-MCNC: 204 MG/DL (ref 70–99)
HCT VFR BLD AUTO: 45.9 % (ref 40–53)
HGB BLD-MCNC: 15.9 G/DL (ref 13.3–17.7)
IMM GRANULOCYTES # BLD: 0 10E3/UL
IMM GRANULOCYTES NFR BLD: 0 %
INR PPP: 0.95 (ref 0.85–1.15)
LYMPHOCYTES # BLD AUTO: 2.3 10E3/UL (ref 0.8–5.3)
LYMPHOCYTES NFR BLD AUTO: 21 %
MCH RBC QN AUTO: 30.8 PG (ref 26.5–33)
MCHC RBC AUTO-ENTMCNC: 34.6 G/DL (ref 31.5–36.5)
MCV RBC AUTO: 89 FL (ref 78–100)
MONOCYTES # BLD AUTO: 0.6 10E3/UL (ref 0–1.3)
MONOCYTES NFR BLD AUTO: 6 %
NEUTROPHILS # BLD AUTO: 7.6 10E3/UL (ref 1.6–8.3)
NEUTROPHILS NFR BLD AUTO: 70 %
NRBC # BLD AUTO: 0 10E3/UL
NRBC BLD AUTO-RTO: 0 /100
PLATELET # BLD AUTO: 294 10E3/UL (ref 150–450)
POTASSIUM SERPL-SCNC: 3.5 MMOL/L (ref 3.4–5.3)
RBC # BLD AUTO: 5.16 10E6/UL (ref 4.4–5.9)
SPECIMEN EXPIRATION DATE: NORMAL
WBC # BLD AUTO: 10.7 10E3/UL (ref 4–11)

## 2023-06-27 PROCEDURE — 85730 THROMBOPLASTIN TIME PARTIAL: CPT | Performed by: PHYSICIAN ASSISTANT

## 2023-06-27 PROCEDURE — 61781 SCAN PROC CRANIAL INTRA: CPT | Performed by: NEUROLOGICAL SURGERY

## 2023-06-27 PROCEDURE — 61510 CRNEC TREPH EXC BRN TUM STTL: CPT | Performed by: NEUROLOGICAL SURGERY

## 2023-06-27 PROCEDURE — 250N000011 HC RX IP 250 OP 636: Mod: JZ | Performed by: NURSE ANESTHETIST, CERTIFIED REGISTERED

## 2023-06-27 PROCEDURE — 200N000001 HC R&B ICU

## 2023-06-27 PROCEDURE — 278N000051 HC OR IMPLANT GENERAL: Performed by: NEUROLOGICAL SURGERY

## 2023-06-27 PROCEDURE — 250N000011 HC RX IP 250 OP 636: Performed by: PHYSICIAN ASSISTANT

## 2023-06-27 PROCEDURE — 85025 COMPLETE CBC W/AUTO DIFF WBC: CPT | Performed by: PHYSICIAN ASSISTANT

## 2023-06-27 PROCEDURE — 99232 SBSQ HOSP IP/OBS MODERATE 35: CPT | Performed by: PHYSICIAN ASSISTANT

## 2023-06-27 PROCEDURE — 250N000011 HC RX IP 250 OP 636: Performed by: ANESTHESIOLOGY

## 2023-06-27 PROCEDURE — 00B00ZX EXCISION OF BRAIN, OPEN APPROACH, DIAGNOSTIC: ICD-10-PCS | Performed by: NEUROLOGICAL SURGERY

## 2023-06-27 PROCEDURE — 250N000013 HC RX MED GY IP 250 OP 250 PS 637: Performed by: PHYSICIAN ASSISTANT

## 2023-06-27 PROCEDURE — 84132 ASSAY OF SERUM POTASSIUM: CPT | Performed by: SURGERY

## 2023-06-27 PROCEDURE — 88307 TISSUE EXAM BY PATHOLOGIST: CPT | Mod: 26 | Performed by: SPECIALIST

## 2023-06-27 PROCEDURE — 88307 TISSUE EXAM BY PATHOLOGIST: CPT | Mod: TC | Performed by: NEUROLOGICAL SURGERY

## 2023-06-27 PROCEDURE — 370N000017 HC ANESTHESIA TECHNICAL FEE, PER MIN: Performed by: NEUROLOGICAL SURGERY

## 2023-06-27 PROCEDURE — 272N000001 HC OR GENERAL SUPPLY STERILE: Performed by: NEUROLOGICAL SURGERY

## 2023-06-27 PROCEDURE — 258N000003 HC RX IP 258 OP 636: Performed by: NURSE ANESTHETIST, CERTIFIED REGISTERED

## 2023-06-27 PROCEDURE — C1713 ANCHOR/SCREW BN/BN,TIS/BN: HCPCS | Performed by: NEUROLOGICAL SURGERY

## 2023-06-27 PROCEDURE — 250N000009 HC RX 250: Performed by: NURSE ANESTHETIST, CERTIFIED REGISTERED

## 2023-06-27 PROCEDURE — 82565 ASSAY OF CREATININE: CPT | Performed by: SURGERY

## 2023-06-27 PROCEDURE — 258N000003 HC RX IP 258 OP 636: Performed by: PHYSICIAN ASSISTANT

## 2023-06-27 PROCEDURE — 8E09XBZ COMPUTER ASSISTED PROCEDURE OF HEAD AND NECK REGION: ICD-10-PCS | Performed by: NEUROLOGICAL SURGERY

## 2023-06-27 PROCEDURE — 36415 COLL VENOUS BLD VENIPUNCTURE: CPT | Performed by: PHYSICIAN ASSISTANT

## 2023-06-27 PROCEDURE — 250N000012 HC RX MED GY IP 250 OP 636 PS 637: Performed by: PHYSICIAN ASSISTANT

## 2023-06-27 PROCEDURE — 85610 PROTHROMBIN TIME: CPT | Performed by: PHYSICIAN ASSISTANT

## 2023-06-27 PROCEDURE — 86901 BLOOD TYPING SEROLOGIC RH(D): CPT | Performed by: PHYSICIAN ASSISTANT

## 2023-06-27 PROCEDURE — 82947 ASSAY GLUCOSE BLOOD QUANT: CPT | Performed by: SURGERY

## 2023-06-27 PROCEDURE — 250N000025 HC SEVOFLURANE, PER MIN: Performed by: NEUROLOGICAL SURGERY

## 2023-06-27 PROCEDURE — 360N000079 HC SURGERY LEVEL 6, PER MIN: Performed by: NEUROLOGICAL SURGERY

## 2023-06-27 PROCEDURE — 00U20JZ SUPPLEMENT DURA MATER WITH SYNTHETIC SUBSTITUTE, OPEN APPROACH: ICD-10-PCS | Performed by: NEUROLOGICAL SURGERY

## 2023-06-27 PROCEDURE — 999N000141 HC STATISTIC PRE-PROCEDURE NURSING ASSESSMENT: Performed by: NEUROLOGICAL SURGERY

## 2023-06-27 PROCEDURE — 258N000003 HC RX IP 258 OP 636: Performed by: SURGERY

## 2023-06-27 PROCEDURE — 710N000009 HC RECOVERY PHASE 1, LEVEL 1, PER MIN: Performed by: NEUROLOGICAL SURGERY

## 2023-06-27 PROCEDURE — 250N000009 HC RX 250: Performed by: NEUROLOGICAL SURGERY

## 2023-06-27 PROCEDURE — 86850 RBC ANTIBODY SCREEN: CPT | Performed by: PHYSICIAN ASSISTANT

## 2023-06-27 DEVICE — IMPLANTABLE DEVICE: Type: IMPLANTABLE DEVICE | Site: CRANIAL | Status: FUNCTIONAL

## 2023-06-27 DEVICE — IMP SCR SYN MATRIX LOW PRO 1.5X04MM SELF DRILL 04.503.104.01: Type: IMPLANTABLE DEVICE | Site: CRANIAL | Status: FUNCTIONAL

## 2023-06-27 DEVICE — GRAFT DURAGEN 2X2" ID220: Type: IMPLANTABLE DEVICE | Site: CRANIAL | Status: FUNCTIONAL

## 2023-06-27 DEVICE — IMP PLATE SYN BURR HOLE COVER 17MM 04.503.023: Type: IMPLANTABLE DEVICE | Site: CRANIAL | Status: FUNCTIONAL

## 2023-06-27 RX ORDER — HYDROCHLOROTHIAZIDE 25 MG/1
25 TABLET ORAL DAILY
Status: DISCONTINUED | OUTPATIENT
Start: 2023-06-28 | End: 2023-06-29 | Stop reason: HOSPADM

## 2023-06-27 RX ORDER — LIDOCAINE HYDROCHLORIDE 20 MG/ML
INJECTION, SOLUTION INFILTRATION; PERINEURAL PRN
Status: DISCONTINUED | OUTPATIENT
Start: 2023-06-27 | End: 2023-06-27

## 2023-06-27 RX ORDER — NALOXONE HYDROCHLORIDE 0.4 MG/ML
0.2 INJECTION, SOLUTION INTRAMUSCULAR; INTRAVENOUS; SUBCUTANEOUS
Status: DISCONTINUED | OUTPATIENT
Start: 2023-06-27 | End: 2023-06-29 | Stop reason: HOSPADM

## 2023-06-27 RX ORDER — SODIUM CHLORIDE 9 MG/ML
INJECTION, SOLUTION INTRAVENOUS CONTINUOUS PRN
Status: DISCONTINUED | OUTPATIENT
Start: 2023-06-27 | End: 2023-06-27

## 2023-06-27 RX ORDER — POLYETHYLENE GLYCOL 3350 17 G/17G
17 POWDER, FOR SOLUTION ORAL DAILY
Status: DISCONTINUED | OUTPATIENT
Start: 2023-06-28 | End: 2023-06-29 | Stop reason: HOSPADM

## 2023-06-27 RX ORDER — HYDRALAZINE HYDROCHLORIDE 20 MG/ML
10-20 INJECTION INTRAMUSCULAR; INTRAVENOUS EVERY 30 MIN PRN
Status: DISCONTINUED | OUTPATIENT
Start: 2023-06-27 | End: 2023-06-29 | Stop reason: HOSPADM

## 2023-06-27 RX ORDER — LABETALOL HYDROCHLORIDE 5 MG/ML
10-40 INJECTION, SOLUTION INTRAVENOUS EVERY 10 MIN PRN
Status: DISCONTINUED | OUTPATIENT
Start: 2023-06-27 | End: 2023-06-29 | Stop reason: HOSPADM

## 2023-06-27 RX ORDER — ONDANSETRON 2 MG/ML
INJECTION INTRAMUSCULAR; INTRAVENOUS PRN
Status: DISCONTINUED | OUTPATIENT
Start: 2023-06-27 | End: 2023-06-27

## 2023-06-27 RX ORDER — NICOTINE POLACRILEX 4 MG
15-30 LOZENGE BUCCAL
Status: DISCONTINUED | OUTPATIENT
Start: 2023-06-27 | End: 2023-06-29 | Stop reason: HOSPADM

## 2023-06-27 RX ORDER — HYDROMORPHONE HCL IN WATER/PF 6 MG/30 ML
0.2 PATIENT CONTROLLED ANALGESIA SYRINGE INTRAVENOUS EVERY 5 MIN PRN
Status: DISCONTINUED | OUTPATIENT
Start: 2023-06-27 | End: 2023-06-27 | Stop reason: HOSPADM

## 2023-06-27 RX ORDER — HYDROMORPHONE HCL IN WATER/PF 6 MG/30 ML
0.4 PATIENT CONTROLLED ANALGESIA SYRINGE INTRAVENOUS EVERY 5 MIN PRN
Status: DISCONTINUED | OUTPATIENT
Start: 2023-06-27 | End: 2023-06-27 | Stop reason: HOSPADM

## 2023-06-27 RX ORDER — METFORMIN HCL 500 MG
500 TABLET, EXTENDED RELEASE 24 HR ORAL
Status: DISCONTINUED | OUTPATIENT
Start: 2023-06-28 | End: 2023-06-27

## 2023-06-27 RX ORDER — DEXAMETHASONE 4 MG/1
4 TABLET ORAL EVERY 6 HOURS SCHEDULED
Status: DISPENSED | OUTPATIENT
Start: 2023-06-27 | End: 2023-06-28

## 2023-06-27 RX ORDER — BACITRACIN ZINC 500 [USP'U]/G
OINTMENT TOPICAL PRN
Status: DISCONTINUED | OUTPATIENT
Start: 2023-06-27 | End: 2023-06-27 | Stop reason: HOSPADM

## 2023-06-27 RX ORDER — SODIUM CHLORIDE, SODIUM LACTATE, POTASSIUM CHLORIDE, CALCIUM CHLORIDE 600; 310; 30; 20 MG/100ML; MG/100ML; MG/100ML; MG/100ML
INJECTION, SOLUTION INTRAVENOUS CONTINUOUS
Status: DISCONTINUED | OUTPATIENT
Start: 2023-06-27 | End: 2023-06-27 | Stop reason: HOSPADM

## 2023-06-27 RX ORDER — PROPOFOL 10 MG/ML
INJECTION, EMULSION INTRAVENOUS CONTINUOUS PRN
Status: DISCONTINUED | OUTPATIENT
Start: 2023-06-27 | End: 2023-06-27

## 2023-06-27 RX ORDER — LISINOPRIL 40 MG/1
40 TABLET ORAL DAILY
Status: DISCONTINUED | OUTPATIENT
Start: 2023-06-28 | End: 2023-06-29 | Stop reason: HOSPADM

## 2023-06-27 RX ORDER — NALOXONE HYDROCHLORIDE 0.4 MG/ML
0.4 INJECTION, SOLUTION INTRAMUSCULAR; INTRAVENOUS; SUBCUTANEOUS
Status: DISCONTINUED | OUTPATIENT
Start: 2023-06-27 | End: 2023-06-29 | Stop reason: HOSPADM

## 2023-06-27 RX ORDER — FENTANYL CITRATE 50 UG/ML
INJECTION, SOLUTION INTRAMUSCULAR; INTRAVENOUS PRN
Status: DISCONTINUED | OUTPATIENT
Start: 2023-06-27 | End: 2023-06-27

## 2023-06-27 RX ORDER — HYDROMORPHONE HCL IN WATER/PF 6 MG/30 ML
0.2 PATIENT CONTROLLED ANALGESIA SYRINGE INTRAVENOUS
Status: DISCONTINUED | OUTPATIENT
Start: 2023-06-27 | End: 2023-06-29 | Stop reason: HOSPADM

## 2023-06-27 RX ORDER — LIDOCAINE 40 MG/G
CREAM TOPICAL
Status: DISCONTINUED | OUTPATIENT
Start: 2023-06-27 | End: 2023-06-29 | Stop reason: HOSPADM

## 2023-06-27 RX ORDER — BISACODYL 10 MG
10 SUPPOSITORY, RECTAL RECTAL DAILY PRN
Status: DISCONTINUED | OUTPATIENT
Start: 2023-06-27 | End: 2023-06-29 | Stop reason: HOSPADM

## 2023-06-27 RX ORDER — ONDANSETRON 4 MG/1
4 TABLET, ORALLY DISINTEGRATING ORAL EVERY 30 MIN PRN
Status: DISCONTINUED | OUTPATIENT
Start: 2023-06-27 | End: 2023-06-27 | Stop reason: HOSPADM

## 2023-06-27 RX ORDER — OXYCODONE HYDROCHLORIDE 5 MG/1
10-15 TABLET ORAL
Status: DISCONTINUED | OUTPATIENT
Start: 2023-06-27 | End: 2023-06-29 | Stop reason: HOSPADM

## 2023-06-27 RX ORDER — METHADONE HYDROCHLORIDE 10 MG/ML
5 INJECTION, SOLUTION INTRAMUSCULAR; INTRAVENOUS; SUBCUTANEOUS ONCE
Status: COMPLETED | OUTPATIENT
Start: 2023-06-27 | End: 2023-06-27

## 2023-06-27 RX ORDER — OXYCODONE HYDROCHLORIDE 5 MG/1
5-10 TABLET ORAL EVERY 4 HOURS PRN
Status: DISCONTINUED | OUTPATIENT
Start: 2023-06-27 | End: 2023-06-27

## 2023-06-27 RX ORDER — CEFAZOLIN SODIUM/WATER 2 G/20 ML
2 SYRINGE (ML) INTRAVENOUS SEE ADMIN INSTRUCTIONS
Status: DISCONTINUED | OUTPATIENT
Start: 2023-06-27 | End: 2023-06-27 | Stop reason: HOSPADM

## 2023-06-27 RX ORDER — METHOCARBAMOL 750 MG/1
750 TABLET, FILM COATED ORAL EVERY 6 HOURS PRN
Status: DISCONTINUED | OUTPATIENT
Start: 2023-06-27 | End: 2023-06-29 | Stop reason: HOSPADM

## 2023-06-27 RX ORDER — SODIUM CHLORIDE 9 MG/ML
INJECTION, SOLUTION INTRAVENOUS CONTINUOUS
Status: DISCONTINUED | OUTPATIENT
Start: 2023-06-27 | End: 2023-06-29

## 2023-06-27 RX ORDER — PROPOFOL 10 MG/ML
INJECTION, EMULSION INTRAVENOUS PRN
Status: DISCONTINUED | OUTPATIENT
Start: 2023-06-27 | End: 2023-06-27

## 2023-06-27 RX ORDER — DEXMEDETOMIDINE HYDROCHLORIDE 4 UG/ML
INJECTION, SOLUTION INTRAVENOUS PRN
Status: DISCONTINUED | OUTPATIENT
Start: 2023-06-27 | End: 2023-06-27

## 2023-06-27 RX ORDER — ACETAMINOPHEN 325 MG/1
650 TABLET ORAL EVERY 4 HOURS PRN
Status: DISCONTINUED | OUTPATIENT
Start: 2023-06-30 | End: 2023-06-29 | Stop reason: HOSPADM

## 2023-06-27 RX ORDER — DEXAMETHASONE SODIUM PHOSPHATE 4 MG/ML
4 INJECTION, SOLUTION INTRA-ARTICULAR; INTRALESIONAL; INTRAMUSCULAR; INTRAVENOUS; SOFT TISSUE EVERY 6 HOURS SCHEDULED
Status: DISPENSED | OUTPATIENT
Start: 2023-06-27 | End: 2023-06-28

## 2023-06-27 RX ORDER — EPHEDRINE SULFATE 50 MG/ML
INJECTION, SOLUTION INTRAMUSCULAR; INTRAVENOUS; SUBCUTANEOUS PRN
Status: DISCONTINUED | OUTPATIENT
Start: 2023-06-27 | End: 2023-06-27

## 2023-06-27 RX ORDER — HYDROMORPHONE HYDROCHLORIDE 1 MG/ML
0.5 INJECTION, SOLUTION INTRAMUSCULAR; INTRAVENOUS; SUBCUTANEOUS
Status: DISCONTINUED | OUTPATIENT
Start: 2023-06-27 | End: 2023-06-29 | Stop reason: HOSPADM

## 2023-06-27 RX ORDER — FENTANYL CITRATE 50 UG/ML
25 INJECTION, SOLUTION INTRAMUSCULAR; INTRAVENOUS EVERY 5 MIN PRN
Status: DISCONTINUED | OUTPATIENT
Start: 2023-06-27 | End: 2023-06-27 | Stop reason: HOSPADM

## 2023-06-27 RX ORDER — DEXTROSE MONOHYDRATE 25 G/50ML
25-50 INJECTION, SOLUTION INTRAVENOUS
Status: DISCONTINUED | OUTPATIENT
Start: 2023-06-27 | End: 2023-06-29 | Stop reason: HOSPADM

## 2023-06-27 RX ORDER — CITALOPRAM HYDROBROMIDE 20 MG/1
20 TABLET ORAL EVERY EVENING
Status: DISCONTINUED | OUTPATIENT
Start: 2023-06-28 | End: 2023-06-29 | Stop reason: HOSPADM

## 2023-06-27 RX ORDER — ONDANSETRON 2 MG/ML
4 INJECTION INTRAMUSCULAR; INTRAVENOUS EVERY 6 HOURS PRN
Status: DISCONTINUED | OUTPATIENT
Start: 2023-06-27 | End: 2023-06-29 | Stop reason: HOSPADM

## 2023-06-27 RX ORDER — GLYCOPYRROLATE 0.2 MG/ML
INJECTION, SOLUTION INTRAMUSCULAR; INTRAVENOUS PRN
Status: DISCONTINUED | OUTPATIENT
Start: 2023-06-27 | End: 2023-06-27

## 2023-06-27 RX ORDER — ALBUTEROL SULFATE 90 UG/1
2 AEROSOL, METERED RESPIRATORY (INHALATION) EVERY 6 HOURS PRN
Status: DISCONTINUED | OUTPATIENT
Start: 2023-06-27 | End: 2023-06-29 | Stop reason: HOSPADM

## 2023-06-27 RX ORDER — METFORMIN HCL 500 MG
1000 TABLET, EXTENDED RELEASE 24 HR ORAL
Status: DISCONTINUED | OUTPATIENT
Start: 2023-06-27 | End: 2023-06-27

## 2023-06-27 RX ORDER — FENTANYL CITRATE 50 UG/ML
50 INJECTION, SOLUTION INTRAMUSCULAR; INTRAVENOUS EVERY 5 MIN PRN
Status: DISCONTINUED | OUTPATIENT
Start: 2023-06-27 | End: 2023-06-27 | Stop reason: HOSPADM

## 2023-06-27 RX ORDER — PROPRANOLOL HYDROCHLORIDE 20 MG/1
20 TABLET ORAL 2 TIMES DAILY
Status: DISCONTINUED | OUTPATIENT
Start: 2023-06-27 | End: 2023-06-29 | Stop reason: HOSPADM

## 2023-06-27 RX ORDER — ONDANSETRON 4 MG/1
4 TABLET, ORALLY DISINTEGRATING ORAL EVERY 6 HOURS PRN
Status: DISCONTINUED | OUTPATIENT
Start: 2023-06-27 | End: 2023-06-29 | Stop reason: HOSPADM

## 2023-06-27 RX ORDER — CEFAZOLIN SODIUM/WATER 2 G/20 ML
2 SYRINGE (ML) INTRAVENOUS
Status: COMPLETED | OUTPATIENT
Start: 2023-06-27 | End: 2023-06-27

## 2023-06-27 RX ORDER — LABETALOL HYDROCHLORIDE 5 MG/ML
INJECTION, SOLUTION INTRAVENOUS PRN
Status: DISCONTINUED | OUTPATIENT
Start: 2023-06-27 | End: 2023-06-27

## 2023-06-27 RX ORDER — MAGNESIUM HYDROXIDE 1200 MG/15ML
LIQUID ORAL PRN
Status: DISCONTINUED | OUTPATIENT
Start: 2023-06-27 | End: 2023-06-27 | Stop reason: HOSPADM

## 2023-06-27 RX ORDER — DEXAMETHASONE SODIUM PHOSPHATE 4 MG/ML
INJECTION, SOLUTION INTRA-ARTICULAR; INTRALESIONAL; INTRAMUSCULAR; INTRAVENOUS; SOFT TISSUE PRN
Status: DISCONTINUED | OUTPATIENT
Start: 2023-06-27 | End: 2023-06-27

## 2023-06-27 RX ORDER — ONDANSETRON 2 MG/ML
4 INJECTION INTRAMUSCULAR; INTRAVENOUS EVERY 30 MIN PRN
Status: DISCONTINUED | OUTPATIENT
Start: 2023-06-27 | End: 2023-06-27 | Stop reason: HOSPADM

## 2023-06-27 RX ORDER — AMOXICILLIN 250 MG
1 CAPSULE ORAL 2 TIMES DAILY
Status: DISCONTINUED | OUTPATIENT
Start: 2023-06-27 | End: 2023-06-29 | Stop reason: HOSPADM

## 2023-06-27 RX ORDER — LABETALOL HYDROCHLORIDE 5 MG/ML
10 INJECTION, SOLUTION INTRAVENOUS
Status: DISCONTINUED | OUTPATIENT
Start: 2023-06-27 | End: 2023-06-27 | Stop reason: HOSPADM

## 2023-06-27 RX ORDER — ACETAMINOPHEN 325 MG/1
975 TABLET ORAL EVERY 8 HOURS
Status: DISCONTINUED | OUTPATIENT
Start: 2023-06-27 | End: 2023-06-29 | Stop reason: HOSPADM

## 2023-06-27 RX ORDER — METHADONE HYDROCHLORIDE 5 MG/1
5 TABLET ORAL AT BEDTIME
Status: DISCONTINUED | OUTPATIENT
Start: 2023-06-28 | End: 2023-06-29 | Stop reason: HOSPADM

## 2023-06-27 RX ORDER — BUPIVACAINE HYDROCHLORIDE AND EPINEPHRINE 2.5; 5 MG/ML; UG/ML
INJECTION, SOLUTION INFILTRATION; PERINEURAL PRN
Status: DISCONTINUED | OUTPATIENT
Start: 2023-06-27 | End: 2023-06-27 | Stop reason: HOSPADM

## 2023-06-27 RX ADMIN — ROCURONIUM BROMIDE 10 MG: 50 INJECTION, SOLUTION INTRAVENOUS at 16:30

## 2023-06-27 RX ADMIN — DEXAMETHASONE SODIUM PHOSPHATE 4 MG: 4 INJECTION, SOLUTION INTRAMUSCULAR; INTRAVENOUS at 21:34

## 2023-06-27 RX ADMIN — DEXAMETHASONE SODIUM PHOSPHATE 10 MG: 4 INJECTION, SOLUTION INTRA-ARTICULAR; INTRALESIONAL; INTRAMUSCULAR; INTRAVENOUS; SOFT TISSUE at 16:00

## 2023-06-27 RX ADMIN — ROCURONIUM BROMIDE 10 MG: 50 INJECTION, SOLUTION INTRAVENOUS at 17:00

## 2023-06-27 RX ADMIN — LIDOCAINE HYDROCHLORIDE 100 MG: 20 INJECTION, SOLUTION INFILTRATION; PERINEURAL at 15:36

## 2023-06-27 RX ADMIN — ACETAMINOPHEN 975 MG: 325 TABLET, FILM COATED ORAL at 21:34

## 2023-06-27 RX ADMIN — GLYCOPYRROLATE 0.2 MG: 0.2 INJECTION, SOLUTION INTRAMUSCULAR; INTRAVENOUS at 16:19

## 2023-06-27 RX ADMIN — SENNOSIDES AND DOCUSATE SODIUM 1 TABLET: 50; 8.6 TABLET ORAL at 21:34

## 2023-06-27 RX ADMIN — HUMAN INSULIN 2 UNITS/HR: 100 INJECTION, SOLUTION SUBCUTANEOUS at 16:03

## 2023-06-27 RX ADMIN — PHENYLEPHRINE HYDROCHLORIDE 0.3 MCG/KG/MIN: 10 INJECTION INTRAVENOUS at 16:00

## 2023-06-27 RX ADMIN — SODIUM CHLORIDE, POTASSIUM CHLORIDE, SODIUM LACTATE AND CALCIUM CHLORIDE: 600; 310; 30; 20 INJECTION, SOLUTION INTRAVENOUS at 14:08

## 2023-06-27 RX ADMIN — SODIUM CHLORIDE: 9 INJECTION, SOLUTION INTRAVENOUS at 20:34

## 2023-06-27 RX ADMIN — PROPOFOL 20 MG: 10 INJECTION, EMULSION INTRAVENOUS at 16:36

## 2023-06-27 RX ADMIN — ROCURONIUM BROMIDE 10 MG: 50 INJECTION, SOLUTION INTRAVENOUS at 16:10

## 2023-06-27 RX ADMIN — OXYCODONE HYDROCHLORIDE 15 MG: 5 TABLET ORAL at 21:34

## 2023-06-27 RX ADMIN — FENTANYL CITRATE 50 MCG: 50 INJECTION, SOLUTION INTRAMUSCULAR; INTRAVENOUS at 18:20

## 2023-06-27 RX ADMIN — Medication 2 G: at 15:34

## 2023-06-27 RX ADMIN — Medication 2.5 MG: at 17:20

## 2023-06-27 RX ADMIN — LABETALOL HYDROCHLORIDE 10 MG: 5 INJECTION INTRAVENOUS at 17:43

## 2023-06-27 RX ADMIN — FENTANYL CITRATE 50 MCG: 50 INJECTION, SOLUTION INTRAMUSCULAR; INTRAVENOUS at 18:37

## 2023-06-27 RX ADMIN — SUGAMMADEX 200 MG: 100 INJECTION, SOLUTION INTRAVENOUS at 17:36

## 2023-06-27 RX ADMIN — ROCURONIUM BROMIDE 50 MG: 50 INJECTION, SOLUTION INTRAVENOUS at 15:37

## 2023-06-27 RX ADMIN — PROPOFOL 50 MG: 10 INJECTION, EMULSION INTRAVENOUS at 15:50

## 2023-06-27 RX ADMIN — NICARDIPINE HYDROCHLORIDE 2.5 MG/HR: 0.2 INJECTION INTRAVENOUS at 21:43

## 2023-06-27 RX ADMIN — HYDROMORPHONE HYDROCHLORIDE 0.5 MG: 1 INJECTION, SOLUTION INTRAMUSCULAR; INTRAVENOUS; SUBCUTANEOUS at 20:34

## 2023-06-27 RX ADMIN — LABETALOL HYDROCHLORIDE 10 MG: 5 INJECTION INTRAVENOUS at 17:45

## 2023-06-27 RX ADMIN — PROPRANOLOL HYDROCHLORIDE 20 MG: 20 TABLET ORAL at 21:41

## 2023-06-27 RX ADMIN — INSULIN GLARGINE 56 UNITS: 100 INJECTION, SOLUTION SUBCUTANEOUS at 22:33

## 2023-06-27 RX ADMIN — MIDAZOLAM 2 MG: 1 INJECTION INTRAMUSCULAR; INTRAVENOUS at 15:28

## 2023-06-27 RX ADMIN — DEXMEDETOMIDINE HYDROCHLORIDE 8 MCG: 200 INJECTION INTRAVENOUS at 16:34

## 2023-06-27 RX ADMIN — PROPOFOL 250 MG: 10 INJECTION, EMULSION INTRAVENOUS at 15:36

## 2023-06-27 RX ADMIN — PROPOFOL 25 MCG/KG/MIN: 10 INJECTION, EMULSION INTRAVENOUS at 16:05

## 2023-06-27 RX ADMIN — PROPOFOL 20 MG: 10 INJECTION, EMULSION INTRAVENOUS at 16:35

## 2023-06-27 RX ADMIN — METHADONE HYDROCHLORIDE 5 MG: 10 INJECTION, SOLUTION INTRAMUSCULAR; INTRAVENOUS; SUBCUTANEOUS at 16:00

## 2023-06-27 RX ADMIN — Medication 5 MG: at 16:27

## 2023-06-27 RX ADMIN — ONDANSETRON 4 MG: 2 INJECTION INTRAMUSCULAR; INTRAVENOUS at 17:22

## 2023-06-27 RX ADMIN — FENTANYL CITRATE 100 MCG: 50 INJECTION, SOLUTION INTRAMUSCULAR; INTRAVENOUS at 15:36

## 2023-06-27 RX ADMIN — DEXMEDETOMIDINE HYDROCHLORIDE 8 MCG: 200 INJECTION INTRAVENOUS at 17:39

## 2023-06-27 RX ADMIN — HYDROMORPHONE HYDROCHLORIDE 0.4 MG: 0.2 INJECTION, SOLUTION INTRAMUSCULAR; INTRAVENOUS; SUBCUTANEOUS at 18:48

## 2023-06-27 RX ADMIN — Medication 2.5 MG: at 17:00

## 2023-06-27 RX ADMIN — SODIUM CHLORIDE: 0.9 INJECTION, SOLUTION INTRAVENOUS at 15:50

## 2023-06-27 ASSESSMENT — ACTIVITIES OF DAILY LIVING (ADL)
ADLS_ACUITY_SCORE: 18
ADLS_ACUITY_SCORE: 20
ADLS_ACUITY_SCORE: 33

## 2023-06-27 NOTE — TELEPHONE ENCOUNTER
PA Initiation    Medication: ALUNBRIG 30 MG PO TABS  Insurance Company: [employer] - Phone [phone] Fax [phone]  Pharmacy Filling the Rx: [facility] - [place], [place] - [address]  Filling Pharmacy Phone:    Filling Pharmacy Fax:    Start Date: 6/27/2023    Juliette Marquez CPhT  [facility]  Oncology Pharmacy Liaison II  [email or web address]  [phone] (phone  [phone] (fax

## 2023-06-27 NOTE — ANESTHESIA CARE TRANSFER NOTE
Patient: Connor Emerson    Procedure: Procedure(s):  Right stealth craniotomy and resection of tumor       Diagnosis: Vasogenic cerebral edema (H) [G93.6]  Metastatic cancer to brain (H) [C79.31]  Diagnosis Additional Information: No value filed.    Anesthesia Type:   General     Note:      Level of Consciousness: awake  Oxygen Supplementation: face mask  Level of Supplemental Oxygen (L/min / FiO2): 8  Independent Airway: airway patency satisfactory and stable  Dentition: dentition unchanged  Vital Signs Stable: post-procedure vital signs reviewed and stable  Report to RN Given: handoff report given  Patient transferred to: PACU  Comments: Patient breathing spontaneously.  Follows commands.  Suctioned and extubated.  Exchanging air well.  Transferred to PACU with 8L O2 via mask.  Monitors on.  VSS.  Patent IV.  Report and transfer of care to RN.    Handoff Report: Identifed the Patient, Identified the Reponsible Provider, Reviewed the pertinent medical history, Discussed the surgical course, Reviewed Intra-OP anesthesia mangement and issues during anesthesia, Set expectations for post-procedure period and Allowed opportunity for questions and acknowledgement of understanding      Vitals:  Vitals Value Taken Time   /83 06/27/23 1800   Temp 36.4  C (97.5  F) 06/27/23 1756   Pulse 80 06/27/23 1807   Resp 15 06/27/23 1807   SpO2 94 % 06/27/23 1807   Vitals shown include unvalidated device data.    Electronically Signed By: JERRY Gross CRNA  June 27, 2023  6:08 PM

## 2023-06-27 NOTE — ANESTHESIA PROCEDURE NOTES
Arterial Line Procedure Note    Pre-Procedure   Staff -        Anesthesiologist:  Mahin Castillo DO       Performed By: anesthesiologist       Location: pre-op       Pre-Anesthestic Checklist: patient identified, IV checked, site marked, risks and benefits discussed, informed consent, monitors and equipment checked, pre-op evaluation and at physician/surgeon's request  Timeout:       Correct Patient: Yes        Correct Procedure: Yes        Correct Site: Yes        Correct Position: Yes   Line Placement:   This line was placed Post Induction  Procedure   Procedure: arterial line       Laterality: left       Insertion Site: radial.  Sterile Prep        All elements of maximal sterile barrier technique followed       Patient Prep/Sterile Barriers: hand hygiene, sterile gloves, hat, mask, draped, draped       Skin prep: Chloraprep  Insertion/Injection        Technique: Seldinger Technique and ultrasound guided        1. Ultrasound was used to evaluate the access site.       2. Artery evaluated via ultrasound for patency/adequacy.       3. Using real-time ultrasound the needle/catheter was observed entering the artery/vein.       4. Permanent image was captured and entered into the patient's record.       5. The visualized structures were anatomically normal.       6. There were no apparent abnormal pathologic findings.       Catheter Type/Size: 20 gauge, 1.75 in/4.5 cm quick cath (integral wire)  Narrative        Tegaderm dressing used.       Complications: None apparent,        Arterial waveform: Yes        IBP within 10% of NIBP: Yes

## 2023-06-27 NOTE — BRIEF OP NOTE
"[facility]    Brief Operative Note    Pre-operative diagnosis: Vasogenic cerebral edema (H) [G93.6]  Metastatic cancer to brain (H) [C79.31]  Post-operative diagnosis Same as pre-operative diagnosis    Procedure: Procedure(s):  Right stealth craniotomy and resection of tumor  Surgeon: Surgeon(s) and Role:     * Stephen Moran MD - Primary  Anesthesia: General   Estimated Blood Loss: 100ml    Drains: None  Specimens:   ID Type Source Tests Collected by Time Destination   1 : metastasis vs radiation necrosis Tissue Brain SURGICAL PATHOLOGY EXAM Stephen Moran MD 6/27/2023  4:48 PM      Findings:   None.  Complications: None.  Implants:   Implant Name Type Inv. Item Serial No.  Lot No. LRB No. Used Action   GRAFT DURAGEN 2X2\"  - [id number] Other GRAFT DURAGEN 2X2\"   [employer] [id number] Right 1 Implanted   IMP PLATE SYN THEA HOLE COVER 17MM 04.503.023 - [id number] Metallic Hardware/[employer] IMP PLATE SYN THEA HOLE COVER 17MM 04.503.023  [employer]  Right 1 Implanted   IMP PLATE SYN MATRIXNEURO DOUBLE Y 6H 21MM 04.503.069 - [id number] Metallic Hardware/[employer] IMP PLATE SYN MATRIXNEURO DOUBLE Y 6H 21MM 04.503.069  Vivoxid-STRATEC  Right 1 Implanted     [id number]      "

## 2023-06-27 NOTE — OP NOTE
Procedure Date: 06/27/2023    PREOPERATIVE DIAGNOSIS:  Metastatic lung cancer with brain metastases, status post radiosurgery.    POSTOPERATIVE DIAGNOSIS:  Metastatic lung cancer with brain metastases, status post radiosurgery.    PROCEDURE:  Right Stealth craniotomy and resection of brain metastasis.    SURGEON:  Stephen Moran MD.    ANESTHESIA:  General endotracheal anesthesia.    ESTIMATED BLOOD LOSS:  100 mL.    INDICATIONS:  The patient is a 43-year-old male with metastatic lung cancer, who has had previously treated brain metastasis who had worsening of a right frontal metastasis with increasing vasogenic edema and brain swelling with worsening brain compression.  He was brought for debulking and pathologic diagnosis.    DESCRIPTION OF PROCEDURE:  The patient was brought to the operating room.  General endotracheal anesthesia was induced.  The patient was positioned supine in the [place] head hopkins with the head turned to the left.  The head was prepped and draped in sterile fashion after the Stealth neuronavigation system was registered and used to plan the incision and craniotomy.  A right sided incision was made and the scalp was reflected anteriorly exposing the frontal bone above the orbit.  The Stealth was used to confirm the location of the craniotomy and a single bur hole was placed under the temporalis muscle and then a bone flap turned from this area.  The dura was opened and reflected anteriorly and the microscope was sterilely draped and brought into the field. Using Stealth neuronavigation, a small corticectomy was made and the lesion was entered.  It had the appearance of significant necrosis; however, multiple specimens were obtained, both directly and with the Sonopet.  The lesion was debulked.  Once this was felt to be completely debulked and multiple specimens obtained for permanent pathology, hemostasis was achieved.  The dura was reapproximated with 4-0 Nurolon.  A 2 x 2 Duragen was  placed over the dural defect and the bone flap plated back into place.  Temporalis fascia was closed and then the galea was closed with 2-0 inverted interrupted Vicryl, running 3-0 nylon was used for skin.  The patient was then awakened, extubated, and taken to the recovery room in good condition.    Stephen Moran MD        D: 2023   T: 2023   MT: [email or web address]    Name:     CAMILA CORREIABailee  MRN:      [id number]        Account:        [id number]   :      1980           Procedure Date: 2023     Document: [id number]

## 2023-06-27 NOTE — ANESTHESIA PROCEDURE NOTES
Airway       Patient location during procedure: OR       Procedure Start/Stop Times: 6/27/2023 3:40 PM  Staff -        Anesthesiologist:  Mahin Castillo DO       CRNA: Rosa New APRN CRNA       Performed By: CRNA  Consent for Airway        Urgency: elective  Indications and Patient Condition       Indications for airway management: makenna-procedural and airway protection       Induction type:intravenous       Mask difficulty assessment: 2 - vent by mask + OA or adjuvant +/- NMBA    Final Airway Details       Final airway type: endotracheal airway       Successful airway: ETT - single and Oral  Endotracheal Airway Details        ETT size (mm): 8.0       Cuffed: yes       Successful intubation technique: video laryngoscopy       VL Blade Size: Rodriguez 4       Grade View of Cords: 1       Adjucts: stylet       Position: Right       Measured from: lips       Secured at (cm): 23       Bite block used: None    Post intubation assessment        Placement verified by: capnometry, equal breath sounds and chest rise        Number of attempts at approach: 1       Number of other approaches attempted: 0       Secured with: pink tape       Ease of procedure: easy       Dentition: Unchanged    Medication(s) Administered   Medication Administration Time: 6/27/2023 3:40 PM

## 2023-06-27 NOTE — ANESTHESIA PREPROCEDURE EVALUATION
Anesthesia Pre-Procedure Evaluation    Patient: Connor Emerson   MRN: [id number] : 1980        Procedure : Procedure(s):  Right stealth craniotomy and resection of tumor          Past Medical History:   Diagnosis Date     Atypical chest pain 2013     Cancer (H)      Complication of anesthesia      Diabetes (H)      GERD (gastroesophageal reflux disease) 2013     History of pulmonary embolism      HTN (hypertension) 2012     HTN, goal below 140/90 2013     Insomnia 2012     Mediastinal lymphadenopathy      Migraine headache 2013     Migraine with aura, without mention of intractable migraine without mention of status migrainosus      Pneumonia       Past Surgical History:   Procedure Laterality Date     BRONCHOSCOPY RIGID OR FLEXIBLE W/TRANSENDOSCOPIC ENDOBRONCHIAL ULTRASOUND GUIDED Bilateral 2022    Procedure: Right BRONCHOSCOPY, FIBEROPTIC, endobronchial ultrasound, pleural biopsy;  Surgeon: Dallin Agrawal MD;  Location: [facility] OR     INJECT BLOCK MEDIAL BRANCH CERVICAL/THORACIC/LUMBAR       INSERT CHEST TUBE Right 2022    Procedure: INSERTION, CATHETER, INTERCOSTAL, FOR DRAINAGE;  Surgeon: Dallin Agrawal MD;  Location: [facility] GI     INSERT CHEST TUBE Right 3/9/2022    Procedure: INSERTION, CATHETER, INTERCOSTAL, FOR DRAINAGE;  Surgeon: Sushila Antonio MD;  Location: [facility] GI     IR CHEST TUBE REMOVAL TUNNELED RIGHT  2022     OPTICAL TRACKING SYSTEM CRANIOTOMY, EXCISE TUMOR, COMBINED Left 2022    Procedure: Left stealth craniotomy for tumor resection with motor mapping;  Surgeon: Stephen Moran MD;  Location:  OR     ORTHOPEDIC SURGERY      Ganesh. Rotator cuff repair.     PLEUROSCOPY N/A 2022    Procedure: Pleuroscopy with Pleural Biopsy;  Surgeon: Dallin Agrawal MD;  Location: [facility] OR      Allergies   Allergen Reactions     Vicodin [Hydrocodone-Acetaminophen] Nausea and Vomiting and GI Disturbance      Social History     Tobacco Use     Smoking  status: Never     Passive exposure: Never     Smokeless tobacco: Never   Substance Use Topics     Alcohol use: Yes     Alcohol/week: 0.0 standard drinks of alcohol     Comment: social      Wt Readings from Last 1 Encounters:   06/27/23 98.9 kg (218 lb 1.6 oz)        Anesthesia Evaluation            ROS/MED HX  ENT/Pulmonary: Comment: Stage IV NSCLC, Rt lung adenocarcinoma with metastasis to pleura, mediastinum , rt pleural effusion and brain diagnosed 1/2022 (AJCC 8th edition)    (+) recent URI,     Neurologic: Comment: Recurrent Mets to the brain. Vasogenic cerebral edema.     Brain mets:  Radiation necrosis  headache    (+) migraines,     Cardiovascular:     (+) hypertension-----Taking blood thinners     METS/Exercise Tolerance:     Hematologic: Comments: normocytic anemia    PE: provoked by malignancy vs bevacizumab in 11/2022    (+) History of blood clots, anemia,     Musculoskeletal:       GI/Hepatic:     (+) GERD,     Renal/Genitourinary:       Endo:     (+) type II DM, Using insulin, - not using insulin pump. Obesity,     Psychiatric/Substance Use:       Infectious Disease: Comment: MSSA infection, Sepsis at pleurex site- resolved      Malignancy:       Other:      (+) , H/O Chronic Pain,        Physical Exam    Airway        Mallampati: II   TM distance: > 3 FB   Neck ROM: full   Mouth opening: > 3 cm    Respiratory Devices and Support         Dental           Cardiovascular   cardiovascular exam normal          Pulmonary   pulmonary exam normal                OUTSIDE LABS:  CBC:   Lab Results   Component Value Date    WBC 10.7 06/27/2023    WBC 6.7 06/16/2023    HGB 15.9 06/27/2023    HGB 14.8 06/16/2023    HCT 45.9 06/27/2023    HCT 42.6 06/16/2023     06/27/2023     06/16/2023     BMP:   Lab Results   Component Value Date     06/16/2023     05/20/2023    POTASSIUM 3.5 06/27/2023    POTASSIUM 3.8 06/16/2023    CHLORIDE 104 06/16/2023    CHLORIDE 105 05/20/2023    CO2 27 06/16/2023     CO2 29 05/20/2023    BUN 11.4 06/16/2023    BUN 14.0 05/20/2023    CR 0.67 06/27/2023    CR 0.59 (L) 06/16/2023     (H) 06/27/2023     (H) 06/16/2023     COAGS:   Lab Results   Component Value Date    PTT 23 06/27/2023    INR 0.95 06/27/2023     POC:   Lab Results   Component Value Date     (H) 04/27/2017     HEPATIC:   Lab Results   Component Value Date    ALBUMIN 4.1 06/16/2023    PROTTOTAL 6.3 (L) 06/16/2023    ALT 20 06/16/2023    AST 17 06/16/2023    ALKPHOS 103 06/16/2023    BILITOTAL 0.6 06/16/2023     OTHER:   Lab Results   Component Value Date    PH 7.61 (HH) 05/17/2023    LACT 1.3 05/17/2023    A1C 8.3 (H) 04/06/2023    TORY 9.1 06/16/2023    PHOS 3.1 06/16/2023    MAG 2.4 (H) 05/20/2023    LIPASE 252 (H) 06/16/2023    AMYLASE 134 (H) 06/16/2023    TSH 2.56 09/12/2022    CRP 97.7 (H) 04/05/2022    SED 6 01/09/2023       Anesthesia Plan    ASA Status:  3   NPO Status:  NPO Appropriate    Anesthesia Type: General.     - Airway: ETT   Induction: Intravenous.   Maintenance: Balanced.        Consents    Anesthesia Plan(s) and associated risks, benefits, and realistic alternatives discussed. Questions answered and patient/representative(s) expressed understanding.    - Discussed:     - Discussed with:  Patient      - Extended Intubation/Ventilatory Support Discussed: No.      - Patient is DNR/DNI Status: No    Use of blood products discussed: Yes.     - Discussed with: Patient.     - Consented: consented to blood products            Reason for refusal: other.     Postoperative Care    Pain management: IV analgesics, Oral pain medications, Multi-modal analgesia.   PONV prophylaxis: Ondansetron (or other 5HT-3), Dexamethasone or Solumedrol, Background Propofol Infusion     Comments:                Mahin Castillo DO

## 2023-06-28 ENCOUNTER — APPOINTMENT (OUTPATIENT)
Dept: OCCUPATIONAL THERAPY | Facility: CLINIC | Age: 43
End: 2023-06-28
Attending: PHYSICIAN ASSISTANT
Payer: COMMERCIAL

## 2023-06-28 ENCOUNTER — APPOINTMENT (OUTPATIENT)
Dept: MRI IMAGING | Facility: CLINIC | Age: 43
End: 2023-06-28
Attending: PHYSICIAN ASSISTANT
Payer: COMMERCIAL

## 2023-06-28 LAB
ANION GAP SERPL CALCULATED.3IONS-SCNC: 11 MMOL/L (ref 7–15)
BUN SERPL-MCNC: 15.5 MG/DL (ref 6–20)
CALCIUM SERPL-MCNC: 9 MG/DL (ref 8.6–10)
CHLORIDE SERPL-SCNC: 103 MMOL/L (ref 98–107)
CREAT SERPL-MCNC: 0.53 MG/DL (ref 0.67–1.17)
DEPRECATED HCO3 PLAS-SCNC: 25 MMOL/L (ref 22–29)
ERYTHROCYTE [DISTWIDTH] IN BLOOD BY AUTOMATED COUNT: 12.4 % (ref 10–15)
GFR SERPL CREATININE-BSD FRML MDRD: >90 ML/MIN/1.73M2
GLUCOSE BLDC GLUCOMTR-MCNC: 160 MG/DL (ref 70–99)
GLUCOSE BLDC GLUCOMTR-MCNC: 170 MG/DL (ref 70–99)
GLUCOSE BLDC GLUCOMTR-MCNC: 244 MG/DL (ref 70–99)
GLUCOSE BLDC GLUCOMTR-MCNC: 255 MG/DL (ref 70–99)
GLUCOSE BLDC GLUCOMTR-MCNC: 304 MG/DL (ref 70–99)
GLUCOSE SERPL-MCNC: 187 MG/DL (ref 70–99)
HCT VFR BLD AUTO: 42.5 % (ref 40–53)
HGB BLD-MCNC: 14.7 G/DL (ref 13.3–17.7)
LIPASE SERPL-CCNC: 19 U/L (ref 13–60)
MCH RBC QN AUTO: 30.9 PG (ref 26.5–33)
MCHC RBC AUTO-ENTMCNC: 34.6 G/DL (ref 31.5–36.5)
MCV RBC AUTO: 90 FL (ref 78–100)
PLATELET # BLD AUTO: 270 10E3/UL (ref 150–450)
POTASSIUM SERPL-SCNC: 3.7 MMOL/L (ref 3.4–5.3)
RBC # BLD AUTO: 4.75 10E6/UL (ref 4.4–5.9)
SODIUM SERPL-SCNC: 139 MMOL/L (ref 136–145)
WBC # BLD AUTO: 17.3 10E3/UL (ref 4–11)

## 2023-06-28 PROCEDURE — 83690 ASSAY OF LIPASE: CPT | Performed by: NEUROLOGICAL SURGERY

## 2023-06-28 PROCEDURE — 250N000013 HC RX MED GY IP 250 OP 250 PS 637: Performed by: PHYSICIAN ASSISTANT

## 2023-06-28 PROCEDURE — 250N000011 HC RX IP 250 OP 636: Mod: JZ | Performed by: PHYSICIAN ASSISTANT

## 2023-06-28 PROCEDURE — 97535 SELF CARE MNGMENT TRAINING: CPT | Mod: GO

## 2023-06-28 PROCEDURE — 99418 PROLNG IP/OBS E/M EA 15 MIN: CPT | Performed by: NURSE PRACTITIONER

## 2023-06-28 PROCEDURE — 250N000012 HC RX MED GY IP 250 OP 636 PS 637: Performed by: PHYSICIAN ASSISTANT

## 2023-06-28 PROCEDURE — 255N000002 HC RX 255 OP 636: Performed by: NEUROLOGICAL SURGERY

## 2023-06-28 PROCEDURE — 258N000003 HC RX IP 258 OP 636: Performed by: PHYSICIAN ASSISTANT

## 2023-06-28 PROCEDURE — 99207 PR NO BILLABLE SERVICE THIS VISIT: CPT | Performed by: HOSPITALIST

## 2023-06-28 PROCEDURE — 120N000013 HC R&B IMCU

## 2023-06-28 PROCEDURE — A9585 GADOBUTROL INJECTION: HCPCS | Performed by: NEUROLOGICAL SURGERY

## 2023-06-28 PROCEDURE — 85027 COMPLETE CBC AUTOMATED: CPT | Performed by: PHYSICIAN ASSISTANT

## 2023-06-28 PROCEDURE — 97530 THERAPEUTIC ACTIVITIES: CPT | Mod: GO

## 2023-06-28 PROCEDURE — 70553 MRI BRAIN STEM W/O & W/DYE: CPT

## 2023-06-28 PROCEDURE — 99255 IP/OBS CONSLTJ NEW/EST HI 80: CPT | Performed by: NURSE PRACTITIONER

## 2023-06-28 PROCEDURE — 80048 BASIC METABOLIC PNL TOTAL CA: CPT | Performed by: PHYSICIAN ASSISTANT

## 2023-06-28 PROCEDURE — 250N000012 HC RX MED GY IP 250 OP 636 PS 637: Performed by: NURSE PRACTITIONER

## 2023-06-28 PROCEDURE — 97166 OT EVAL MOD COMPLEX 45 MIN: CPT | Mod: GO

## 2023-06-28 RX ORDER — DEXAMETHASONE 4 MG/1
4 TABLET ORAL 2 TIMES DAILY
Status: DISCONTINUED | OUTPATIENT
Start: 2023-06-29 | End: 2023-06-29 | Stop reason: HOSPADM

## 2023-06-28 RX ORDER — DEXAMETHASONE 2 MG/1
2 TABLET ORAL 2 TIMES DAILY
Status: DISCONTINUED | OUTPATIENT
Start: 2023-07-02 | End: 2023-06-28

## 2023-06-28 RX ORDER — DEXAMETHASONE 2 MG/1
2 TABLET ORAL 2 TIMES DAILY
Status: DISCONTINUED | OUTPATIENT
Start: 2023-07-03 | End: 2023-06-29 | Stop reason: HOSPADM

## 2023-06-28 RX ORDER — DEXAMETHASONE 1 MG
1 TABLET ORAL DAILY
Status: DISCONTINUED | OUTPATIENT
Start: 2023-07-10 | End: 2023-06-28

## 2023-06-28 RX ORDER — DEXAMETHASONE 2 MG/1
2 TABLET ORAL DAILY
Status: DISCONTINUED | OUTPATIENT
Start: 2023-07-07 | End: 2023-06-28

## 2023-06-28 RX ORDER — DEXAMETHASONE 1 MG
1 TABLET ORAL DAILY
Status: DISCONTINUED | OUTPATIENT
Start: 2023-07-10 | End: 2023-06-29 | Stop reason: HOSPADM

## 2023-06-28 RX ORDER — LORAZEPAM 0.5 MG/1
0.5 TABLET ORAL EVERY 6 HOURS PRN
Status: DISCONTINUED | OUTPATIENT
Start: 2023-06-28 | End: 2023-06-29 | Stop reason: HOSPADM

## 2023-06-28 RX ORDER — GADOBUTROL 604.72 MG/ML
9 INJECTION INTRAVENOUS ONCE
Status: COMPLETED | OUTPATIENT
Start: 2023-06-28 | End: 2023-06-28

## 2023-06-28 RX ORDER — DEXAMETHASONE 2 MG/1
2 TABLET ORAL DAILY
Status: DISCONTINUED | OUTPATIENT
Start: 2023-07-07 | End: 2023-06-29 | Stop reason: HOSPADM

## 2023-06-28 RX ORDER — DEXAMETHASONE 4 MG/1
4 TABLET ORAL 2 TIMES DAILY
Status: DISCONTINUED | OUTPATIENT
Start: 2023-06-28 | End: 2023-06-28

## 2023-06-28 RX ADMIN — GADOBUTROL 9 ML: 604.72 INJECTION INTRAVENOUS at 05:42

## 2023-06-28 RX ADMIN — ACETAMINOPHEN 975 MG: 325 TABLET, FILM COATED ORAL at 06:10

## 2023-06-28 RX ADMIN — POLYETHYLENE GLYCOL 3350 17 G: 17 POWDER, FOR SOLUTION ORAL at 09:04

## 2023-06-28 RX ADMIN — CITALOPRAM HYDROBROMIDE 20 MG: 20 TABLET ORAL at 20:07

## 2023-06-28 RX ADMIN — DEXAMETHASONE 4 MG: 4 TABLET ORAL at 06:10

## 2023-06-28 RX ADMIN — DEXAMETHASONE 4 MG: 4 TABLET ORAL at 17:41

## 2023-06-28 RX ADMIN — PROPRANOLOL HYDROCHLORIDE 20 MG: 20 TABLET ORAL at 20:10

## 2023-06-28 RX ADMIN — OXYCODONE HYDROCHLORIDE 15 MG: 5 TABLET ORAL at 04:02

## 2023-06-28 RX ADMIN — DEXAMETHASONE SODIUM PHOSPHATE 4 MG: 4 INJECTION, SOLUTION INTRAMUSCULAR; INTRAVENOUS at 11:32

## 2023-06-28 RX ADMIN — SENNOSIDES AND DOCUSATE SODIUM 1 TABLET: 50; 8.6 TABLET ORAL at 20:08

## 2023-06-28 RX ADMIN — HYDROMORPHONE HYDROCHLORIDE 0.5 MG: 1 INJECTION, SOLUTION INTRAMUSCULAR; INTRAVENOUS; SUBCUTANEOUS at 01:57

## 2023-06-28 RX ADMIN — METHADONE HYDROCHLORIDE 2.5 MG: 5 TABLET ORAL at 09:04

## 2023-06-28 RX ADMIN — ACETAMINOPHEN 975 MG: 325 TABLET, FILM COATED ORAL at 13:08

## 2023-06-28 RX ADMIN — INSULIN ASPART 1 UNITS: 100 INJECTION, SOLUTION INTRAVENOUS; SUBCUTANEOUS at 09:03

## 2023-06-28 RX ADMIN — ACETAMINOPHEN 975 MG: 325 TABLET, FILM COATED ORAL at 20:07

## 2023-06-28 RX ADMIN — INSULIN ASPART 3 UNITS: 100 INJECTION, SOLUTION INTRAVENOUS; SUBCUTANEOUS at 15:44

## 2023-06-28 RX ADMIN — INSULIN GLARGINE 56 UNITS: 100 INJECTION, SOLUTION SUBCUTANEOUS at 22:36

## 2023-06-28 RX ADMIN — OXYCODONE HYDROCHLORIDE 15 MG: 5 TABLET ORAL at 00:57

## 2023-06-28 RX ADMIN — ONDANSETRON 4 MG: 4 TABLET, ORALLY DISINTEGRATING ORAL at 11:34

## 2023-06-28 RX ADMIN — SODIUM CHLORIDE: 9 INJECTION, SOLUTION INTRAVENOUS at 22:49

## 2023-06-28 RX ADMIN — SODIUM CHLORIDE: 9 INJECTION, SOLUTION INTRAVENOUS at 11:37

## 2023-06-28 RX ADMIN — METHADONE HYDROCHLORIDE 5 MG: 5 TABLET ORAL at 22:33

## 2023-06-28 RX ADMIN — SENNOSIDES AND DOCUSATE SODIUM 1 TABLET: 50; 8.6 TABLET ORAL at 09:04

## 2023-06-28 ASSESSMENT — ACTIVITIES OF DAILY LIVING (ADL)
ADLS_ACUITY_SCORE: 20

## 2023-06-28 NOTE — PROGRESS NOTES
SBAR given to floor 73 Rn, pt transferred via wheelchair, all belongings sent with pt. Neuro;s intact, was up in the chair with SBA. VSS. Afebrile, gaona dcd this morning, bladder scanned, only 60 ml noted. PO encouraged, MIV NS infusing. Family at the bedside. Updated about POC. Care ongoing

## 2023-06-28 NOTE — ANESTHESIA POSTPROCEDURE EVALUATION
Patient: Connor Emerson    Procedure: Procedure(s):  Right stealth craniotomy and resection of tumor       Anesthesia Type:  General    Note:  Disposition: ICU (for neuromonitoring)   Postop Pain Control: Uneventful            Sign Out: Well controlled pain   PONV: No   Neuro/Psych: Uneventful            Sign Out: Acceptable/Baseline neuro status   Airway/Respiratory: Uneventful            Sign Out: Acceptable/Baseline resp. status   CV/Hemodynamics: Uneventful            Sign Out: Acceptable CV status   Other NRE: NONE   DID A NON-ROUTINE EVENT OCCUR? No           Last vitals:  Vitals Value Taken Time   /77 06/27/23 1900   Temp 36.4  C (97.5  F) 06/27/23 1900   Pulse 52 06/27/23 1909   Resp 16 06/27/23 1910   SpO2 98 % 06/27/23 1911   Vitals shown include unvalidated device data.    Electronically Signed By: Niranjan Lundberg MD  June 27, 2023  8:08 PM

## 2023-06-28 NOTE — PROGRESS NOTES
06/28/23 0925   Appointment Info   Signing Clinician's Name / Credentials (OT) Ana M Carlson, OTR/L   Living Environment   People in Home child(elsa), adult;spouse   Current Living Arrangements apartment   Home Accessibility no concerns   Transportation Anticipated family or friend will provide   Living Environment Comments Pt lives in apartment w/ spouse and multiple teenage/adult children. Pt has elevator access. He has a tub shower w/ grab bars.   Self-Care   Usual Activity Tolerance good   Current Activity Tolerance moderate   Equipment Currently Used at Home none   Fall history within last six months no   Activity/Exercise/Self-Care Comment Pt independent w/ ADL tasks and mobility at baseline w/out AD. Reports dressing had been more difficult PTA, requiring more time to complete.   Instrumental Activities of Daily Living (IADL)   IADL Comments Pt shares home mgmt w/ spouse. He works in [employer], had not been driving recently. Spouse manages his meds.   General Information   Onset of Illness/Injury or Date of Surgery 06/27/23   Referring Physician Joann Monroy PA-C   Patient/Family Therapy Goal Statement (OT) go home   Additional Occupational Profile Info/Pertinent History of Current Problem Connor Emerson is a pleasant 43 year old male with a past medical history of insulin-dependent diabetes, hypertension, GERD, and lung cancer with metastases to brain who has been suffering from significant cerebral edema, causing headaches and difficulty with ambulation.  He was admitted by neurosurgery on 6/27/2023 and underwent a right Stealth craniotomy for resection of the lesion   Existing Precautions/Restrictions fall  (crani)   Limitations/Impairments safety/cognitive   Cognitive Status Examination   Orientation Status orientation to person, place and time   Follows Commands over 90% accuracy;delayed response/completion;increased processing time needed   Memory Deficit minimal deficit;short-term memory    Attention Deficit minimal deficit;concentration   Executive Function Deficit moderate deficit;planning/decision-making;organization/sequencing   Cognitive Status Comments Pt AOx4, following commands but needs inc time for processing/some delay noted. Pt w/ mild STM deficits during screen but reports he has not noticed any functional changes. Will continue to monitor.   Cognitive Screens/Assessments   Cognitive Assessments Completed Blessed Orientation-Memory-Concentration   Blessed Orientation-Memory-Concentration Test:  Total Weighted Score out of 28 8   Blessed Orientation-Memory-Concentration Test Norms 0-8 equals normal to mild impairment   Blessed Orientation-Memory-Concentration Interpretation Pt needing cues and inc time to recite months in reverse order, able to recall 3/5 details of name/address after a few minutes. Score indicates mild cognitive deficits which could impact I/ADL independence and safety.   Visual Perception   Visual Impairment/Limitations WFL  (contacts)   Sensory   Sensory Quick Adds sensation intact   Pain Assessment   Patient Currently in Pain Yes, see Vital Sign flowsheet  (pain at R temple - not worse w/ activity)   Range of Motion Comprehensive   General Range of Motion no range of motion deficits identified   Strength Comprehensive (MMT)   General Manual Muscle Testing (MMT) Assessment no strength deficits identified   Coordination   Upper Extremity Coordination No deficits were identified   Bed Mobility   Bed Mobility supine-sit   Supine-Sit [place] (Bed Mobility) supervision   Transfers   Transfers sit-stand transfer   Sit-Stand Transfer   Sit-Stand [place] (Transfers) contact guard   Balance   Balance Comments pt w/ no major LOB observed - CGA-SBA while ambulating today w/out AD   Activities of Daily Living   BADL Assessment/Intervention upper body dressing;lower body dressing;bathing;grooming;toileting   Bathing Assessment/Intervention   [place] Level (Bathing)  minimum assist (75% patient effort)   Comment, (Bathing) per clinical judgement   Upper Body Dressing Assessment/Training   Comment, (Upper Body Dressing) per clinical judgement   [place] Level (Upper Body Dressing) supervision   Lower Body Dressing Assessment/Training   [place] Level (Lower Body Dressing) supervision   Grooming Assessment/Training   Position (Grooming) unsupported standing   [place] Level (Grooming) supervision   Toileting   Comment, (Toileting) per clinical judgement   [place] Level (Toileting) supervision   Clinical Impression   Criteria for Skilled Therapeutic Interventions Met (OT) Yes, treatment indicated   OT Diagnosis decreased I/ADL independence   OT Problem List-Impairments impacting ADL problems related to;activity tolerance impaired;cognition;pain;post-surgical precautions   Assessment of Occupational Performance 3-5 Performance Deficits   Identified Performance Deficits decreased independence w/ bathing, home mgmt, driving   Planned Therapy Interventions (OT) ADL retraining;IADL retraining;cognition;strengthening;transfer training;home program guidelines;progressive activity/exercise;risk factor education   Clinical Decision Making Complexity (OT) moderate complexity   Anticipated Equipment Needs Upon Discharge (OT)   (likely none needed)   Risk & Benefits of therapy have been explained evaluation/treatment results reviewed;care plan/treatment goals reviewed;risks/benefits reviewed;current/potential barriers reviewed;participants voiced agreement with care plan;participants included;patient   OT Total Evaluation Time   OT Eval, Moderate Complexity Minutes (87596) 10   OT Goals   Therapy Frequency (OT) Daily   OT Predicted Duration/Target Date for Goal Attainment 06/30/23   OT Goals Hygiene/Grooming;Upper Body Dressing;Lower Body Dressing;Transfers;Toilet Transfer/Toileting;OT Goal 1;Cognition   Interventions   Interventions Quick Adds Therapeutic  Activity;Self-Care/Home Management   Self-Care/Home Management   Self-Care/Home Mgmt/ADL, Compensatory, Meal Prep Minutes (64456) 10   Treatment Detail/Skilled Intervention Pt in bed upon arrival, agreeable to OT session. pt AOx4 but does have some delay/inc processing time needed. Pt reporting mild pain at R temple but denies dizziness/nausea. Educated pt on post-op crani precautions, verbalizes understanding. VSS at rest on RA , /77. SBA supine<>sit w/ VC for technique and use of bed rail. Pt w/ good sitting balance at EOB - SBA. Pt stood from EOB w/ CGA. Some slight unsteadiness initially but no LOB. Pt marched in place before ambulating over to sink w/ CGA and no AD. Pt stood at sink for 2 g/h tasks w/ SBA. At end of session, left in chair w/ all needs met, alarm on and RN updated. VSS, /79 and lines intact.   Therapeutic Activities   Therapeutic Activity Minutes (65146) 10   Symptoms noted during/after treatment fatigue   Treatment Detail/Skilled Intervention Pt ambulated in room a few laps w/ CGA and no AD. Pt reports BLE feel somewhat weak d/t being in bed but better than before surgery. Ambulated in hallway ~350ft w/ CGA and no AD. w/c follow for safety. Pt tolerating well, steady on his feet w/ no LOB. VSS throughout, pt denies inc in pain.   OT Discharge Planning   OT Plan monitor cog and complete SLUMS, full body dressing, tub transfer, discuss OP OT w/ pt if indicated (referral not placed yet)   OT Discharge Recommendation (DC Rec) home with assist;home with outpatient occupational therapy   OT Rationale for DC Rec Pt functioning below baseline, limited by pain, post-op restrictions, cognitive deficits. Anticipate he will be safe to d/c home w/ family assist for bathing, heavy IADL tasks, meds and driving. Pt may also benefit from OP OT to further address high level cognition.   OT Brief overview of current status CGA in hallway w/out AD, 8 on SBT indicating mild cog deficits   Total Session  Time   Timed Code Treatment Minutes 20   Total Session Time (sum of timed and untimed services) 30

## 2023-06-28 NOTE — PROGRESS NOTES
Neuro: Patient alert and oriented, c/o pain in right temple not resolved with 0.5 mg iv dilaudid. Discussed pain management plan with patient, hospitalist and neurosurgery. Plan to increase po oxycodone to 15 mg. Pain management team consulted by neurosurgery. Patient will continue his regularly scheduled po methadone also. Pt consistantly rated pain 8/10. Ice applied and oxycodone, tylenol given as scheduled. Administering po oxy 15 mg q 3 hour. Currently pt rating pain 2/10 and controlled. Neurologically intact with no deficits.    CV: Sinus bradycardic. On nicardipine for short amount of time. BP currently 115-130's systolic.     PULM; Lungs clear. Pt on 2 lpm nc. ETCO2 40-43.     GI: consistant carb with carb counting. lantus given as ordered as well as sliding scale insulin.     : gaona in for post op monitoring. Urine sedimented. Gaona cares as ordered    Lines: arterial line, positional. BP cuff correlates intermittently  But not consistent. 2 PIV's    Integumentary: pt intact with scab on left forehead, right head incision from ear to midline with sutures. Scant sanguinous drainage.

## 2023-06-28 NOTE — CONSULTS
[facility]  Consult Note - Hospitalist Service     Date of Admission:  6/27/2023  Consult Requested by: Joann Monroy PA-C  Reason for Consult: Postoperative medical management of chronic conditions including insulin-dependent diabetes.  PRIMARY CARE PROVIDER:    Radha Shetty Ra    Assessment & Plan   Connor Emerson is a pleasant 43 year old male with a past medical history of insulin-dependent diabetes, hypertension, GERD, and lung cancer with metastases to brain who has been suffering from significant cerebral edema, causing headaches and difficulty with ambulation.  He was admitted by neurosurgery on 6/27/2023 and underwent a right Stealth craniotomy for resection of the lesion.  Hospitalist service was consulted for postoperative management of diabetes and chronic conditions.    Lung cancer with brain metastases, cerebral edema  Status post right Stealth craniotomy for resection of brain lesion  --For routine postoperative cares including pain management, DVT prophylaxis, neurochecks, bowel regimen, and PT/OT to orthopedic surgery  --Patient currently on dexamethasone, defer to NSG for dosing    Type 2 diabetes, insulin-dependent  Hemoglobin A1c in April was 8.3.  PTA on metformin, Lantus 70 units daily, and sliding scale NovoLog with meals.  -- Hold PTA metformin  -- Resume Lantus at decreased dose of 56 units tonight, will try to increase back to his home regimen as able  -- PTA med list states patient takes NovoLog up to 40 units at a time.  After discussion with the patient and family member, this does not seem accurate, he typically does not take that much insulin, and works out for sliding scale.  -- Moderate dose SSI NovoLog ordered, can increase as necessary  -- NovoLog 1 unit per 15 g carbohydrate 3 times daily with meals ordered  -- Anticipate fluctuations in blood glucose as patient is on dexamethasone, may develop hyperglycemia.  He has not yet eaten, but is able to  start a diet and advance as tolerated per neurosurgery.    Hypertension  --BP parameters per NSG.  --For now, would hold PTA hydrochlorothiazide.  --Resume PTA propranolol 20 mg twice daily, lisinopril 40 mg daily with hold parameters  -- BMP in a.m.      Diet: Combination Diet     DVT Prophylaxis: Defer to primary service   Heart Catheter: PRESENT, indication: Strict 1-2 Hour I&O    Disposition Plan       Expected Discharge Date: 06/29/2023      Destination: home with family        Entered: ERIC Mazariegos 06/27/2023, 9:06 PM        The patient's care was discussed with the patient, patient's family, and bedside nurse.    The patient has been discussed with Dr. Hook, who agrees with the assessment and plan at this time.      The hospitalist service would like to thank Joann Monroy PA-C for the consult.  We will continue to follow.      ERIC Mazariegos  [facility]    ______________________________________________________________________      History of Present Illness    History is obtained from the patient, family members, and EMR.      Connor Emerson is a pleasant 43 year old male with a past medical history of insulin-dependent diabetes, hypertension, GERD, and lung cancer with metastases to brain who has been suffering from significant cerebral edema, causing headaches and difficulty with ambulation.  His updated MRI shows worsening of the vasogenic cerebral edema around the tumor as well as increasing enhancement.  This is considered to be concerning for recurrence of his cancer although could be due to radiation necrosis.  He was admitted by neurosurgery on 6/27/2023 and underwent a right Stealth craniotomy for resection of the lesion.  Hospitalist service was consulted for postoperative management of diabetes and chronic conditions.    Patient doing well postoperatively.  He reports having a headache, which is not unexpected.  His vital signs are stable.  Lab  work is reviewed.  Discussed his diabetes regimen with him and his family.  Normally he takes Lantus in the morning time, 70 units, and only took 56 units this morning at the recommendation of his primary provider in anticipation of surgery.  He also takes NovoLog per sliding scale with meals.  Last hemoglobin A1c 8.3.  He tries to maintain a carbohydrate consistent diet.  Currently denies any fever, chills, chest pain, shortness of breath, focal weakness, abdominal pain, nausea, vomiting, diarrhea, or other concerning symptoms.  He states that he is hungry, requesting to eat.    Review of Systems   The 10 point Review of Systems is negative other than noted in the HPI.    Past Medical History    I have reviewed this patient's medical history and updated it with pertinent information if needed.   Past Medical History:   Diagnosis Date     Atypical chest pain 12/02/2013     Cancer (H)      Complication of anesthesia      Diabetes (H)      GERD (gastroesophageal reflux disease) 12/02/2013     History of pulmonary embolism      HTN (hypertension) 05/14/2012     HTN, goal below 140/90 07/02/2013     Insomnia 02/21/2012     Mediastinal lymphadenopathy      Migraine headache 07/02/2013     Migraine with aura, without mention of intractable migraine without mention of status migrainosus      Pneumonia        Past Surgical History   I have reviewed this patient's surgical history and updated it with pertinent information if needed.  Past Surgical History:   Procedure Laterality Date     BRONCHOSCOPY RIGID OR FLEXIBLE W/TRANSENDOSCOPIC ENDOBRONCHIAL ULTRASOUND GUIDED Bilateral 1/26/2022    Procedure: Right BRONCHOSCOPY, FIBEROPTIC, endobronchial ultrasound, pleural biopsy;  Surgeon: Dallin Agrawal MD;  Location: [facility] OR     INJECT BLOCK MEDIAL BRANCH CERVICAL/THORACIC/LUMBAR       INSERT CHEST TUBE Right 2/16/2022    Procedure: INSERTION, CATHETER, INTERCOSTAL, FOR DRAINAGE;  Surgeon: Dallin Agrawal MD;  Location: [facility] GI      INSERT CHEST TUBE Right 3/9/2022    Procedure: INSERTION, CATHETER, INTERCOSTAL, FOR DRAINAGE;  Surgeon: Sushila Antonio MD;  [facility]     IR CHEST TUBE REMOVAL TUNNELED RIGHT  2022     OPTICAL TRACKING SYSTEM CRANIOTOMY, EXCISE TUMOR, COMBINED Left 2022    Procedure: Left stealth craniotomy for tumor resection with motor mapping;  Surgeon: Stephen Moran MD;  Location:  OR     ORTHOPEDIC SURGERY      Ganesh. Rotator cuff repair.     PLEUROSCOPY N/A 2022    Procedure: Pleuroscopy with Pleural Biopsy;  Surgeon: Dallin Agrawal MD;  Location:  OR       Social History   I have reviewed this patient's social history and updated it with pertinent information if needed.     Social History     Tobacco Use     Smoking status: Never     Passive exposure: Never     Smokeless tobacco: Never   Vaping Use     Vaping Use: Never used   Substance Use Topics     Alcohol use: Yes     Alcohol/week: 0.0 standard drinks of alcohol     Comment: social     Drug use: No       Medications   Prior to Admission Medications   Prescriptions Last Dose Informant Patient Reported? Taking?   Alcohol Swabs PADS  Spouse/Significant Other No No   Sig: Use to swab the area of the injection or jabier as directed. Per insurance coverage   Continuous Blood Gluc Sensor (FREESTYLE IRENE 2 SENSOR) MISC  Spouse/Significant Other No No   Si each every 14 days Apply as directed per  instructions   Sharps Container MISC  Spouse/Significant Other No No   Sig: Use as directed to dispose of needles, lancets and other sharps   albuterol (PROAIR HFA/PROVENTIL HFA/VENTOLIN HFA) 108 (90 Base) MCG/ACT inhaler  at PRN Spouse/Significant Other No Yes   Sig: Inhale 2 puffs into the lungs every 6 hours as needed for shortness of breath, wheezing or cough   alcohol swab prep pads  Spouse/Significant Other No No   Sig: Use to swab area of injection/jabier as directed.   blood glucose (NO BRAND SPECIFIED) lancets standard   Spouse/Significant Other No No   Sig: To use to test glucose level in the blood Use to test blood sugar  4  times daily as directed. To accompany glucose monitor brands per insurance coverage.   blood glucose (NO BRAND SPECIFIED) test strip  Spouse/Significant Other No No   Sig: To use to test glucose level in the blood Use to test blood sugar  4 times daily as directed. To accompany glucose monitor brands per insurance coverage.   blood glucose monitoring (NO BRAND SPECIFIED) meter device kit  Spouse/Significant Other No No   Sig: Use to test blood sugar 2 times daily or as directed. Preferred blood glucose meter OR supplies to accompany: Blood Glucose Monitor Brands: per insurance.   blood glucose monitoring (NO BRAND SPECIFIED) meter device kit  Spouse/Significant Other No No   Sig: Use as directed. Per insurance coverage   brigatinib (ALUNBRIG) 30 MG TABS tablet 6/24/2023  No No   Sig: Take 6 tablets (180 mg) by mouth daily   citalopram (CELEXA) 20 MG tablet 6/27/2023 Spouse/Significant Other No Yes   Sig: Take 1 tablet (20 mg) by mouth every evening   dexamethasone (DECADRON) 1 MG tablet 6/24/2023 at AM Spouse/Significant Other No Yes   Sig: Take 1 tablet (1 mg) by mouth daily (with breakfast)   hydrochlorothiazide (HYDRODIURIL) 25 MG tablet 6/27/2023 at AM Spouse/Significant Other No Yes   Sig: Take 1 tablet (25 mg) by mouth daily   insulin aspart (NOVOLOG PEN) 100 UNIT/ML pen  at AM Spouse/Significant Other No Yes   Sig: Inject 0-40 Units Subcutaneous 3 times daily (before meals) Per discharge instructions sliding scale   insulin glargine (LANTUS PEN) 100 UNIT/ML pen 6/27/2023 at AM Spouse/Significant Other No Yes   Sig: Inject 70 Units Subcutaneous At Bedtime   insulin pen needle (31G X 8 MM) 31G X 8 MM miscellaneous  Spouse/Significant Other No No   Sig: Use one pen needles daily or as directed.   insulin pen needle (32G X 4 MM) 32G X 4 MM miscellaneous  Spouse/Significant Other No No   Sig: Use as directed  by provider. Per insurance coverage   lisinopril (ZESTRIL) 40 MG tablet 6/27/2023 at AM Spouse/Significant Other No Yes   Sig: Take 1 tablet (40 mg) by mouth daily   metFORMIN (GLUCOPHAGE XR) 500 MG 24 hr tablet Past Week at AM Spouse/Significant Other Yes Yes   Sig: Take 1 tablet (500 mg) by mouth daily (with breakfast)   metFORMIN (GLUCOPHAGE XR) 500 MG 24 hr tablet Past Week at PM Spouse/Significant Other No Yes   Sig: Take 2 tablets (1,000 mg) by mouth daily (with dinner)   methadone (DOLOPHINE) 5 MG tablet  at PM Spouse/Significant Other No Yes   Sig: Take 0.5 tablets (2.5 mg) by mouth every morning AND 1 tablet (5 mg) At Bedtime.   naloxone (NARCAN) 4 MG/0.1ML nasal spray  Spouse/Significant Other No Yes   Sig: Spray 1 spray (4 mg) into one nostril alternating nostrils as needed for opioid reversal every 2-3 minutes until assistance arrives   oxyCODONE (ROXICODONE) 5 MG tablet  at PRN Spouse/Significant Other No Yes   Sig: Take 1-2 tablets (5-10 mg) by mouth every 4 hours as needed for moderate to severe pain   propranolol (INDERAL) 20 MG tablet 6/27/2023 at AM Spouse/Significant Other No Yes   Sig: Take 1 tablet (20 mg) by mouth 2 times daily   zolpidem (AMBIEN) 5 MG tablet  at PRN Spouse/Significant Other No Yes   Sig: Take 1 tablet (5 mg) by mouth nightly as needed for sleep      Facility-Administered Medications: None     Allergies   Allergies   Allergen Reactions     Vicodin [Hydrocodone-Acetaminophen] Nausea and Vomiting and GI Disturbance       Physical Exam   Vital Signs: Temp: 98.5  F (36.9  C) Temp src: Oral BP: 125/81 Pulse: 58   Resp: 19 SpO2: 93 % O2 Device: Nasal cannula Oxygen Delivery: 2 LPM  Weight: 204 lbs 5.86 oz    Constitutional: Awake, alert, cooperative, no apparent distress.    ENT: Head normocephalic, postoperative changes, oropharynx with MMM.  Eyes pupils equal and round.  Pulmonary: No increased work of breathing, good air exchange, clear to auscultation bilaterally, no crackles or  wheezing.  Cardiovascular: RRR, normal S1 and S2, no murmur.  Skin/Integumen: No rashes on exposed skin  Neuro: CN II-XII grossly intact.  Speech intact.  Higher functions grossly intact.    Psych:  Alert and oriented x 3.  Normal mood and affect  Extremities: No lower extremity edema noted.  Moves all 4 extremities spontaneously.     Data   Results for orders placed or performed during the hospital encounter of 06/27/23 (from the past 24 hour(s))   CBC with platelets differential    Narrative    The following orders were created for panel order CBC with platelets differential.  Procedure                               Abnormality         Status                     ---------                               -----------         ------                     CBC with platelets and d...[[id number]]                      Final result                 Please view results for these tests on the individual orders.   INR   Result Value Ref Range    INR 0.95 0.85 - 1.15   Partial thromboplastin time   Result Value Ref Range    aPTT 23 22 - 38 Seconds   ABO/Rh type and screen    Narrative    The following orders were created for panel order ABO/Rh type and screen.  Procedure                               Abnormality         Status                     ---------                               -----------         ------                     Adult Type and Screen[[id number]]                            Final result                 Please view results for these tests on the individual orders.   Glucose   Result Value Ref Range    Glucose 204 (H) 70 - 99 mg/dL   Potassium - Pre-Op   Result Value Ref Range    Potassium 3.5 3.4 - 5.3 mmol/L   Creatinine - Pre-Op   Result Value Ref Range    Creatinine 0.67 0.67 - 1.17 mg/dL    GFR Estimate >90 >60 mL/min/1.73m2   CBC with platelets and differential   Result Value Ref Range    WBC Count 10.7 4.0 - 11.0 10e3/uL    RBC Count 5.16 4.40 - 5.90 10e6/uL    Hemoglobin 15.9 13.3 - 17.7 g/dL    Hematocrit  45.9 40.0 - 53.0 %    MCV 89 78 - 100 fL    MCH 30.8 26.5 - 33.0 pg    MCHC 34.6 31.5 - 36.5 g/dL    RDW 12.6 10.0 - 15.0 %    Platelet Count 294 150 - 450 10e3/uL    % Neutrophils 70 %    % Lymphocytes 21 %    % Monocytes 6 %    % Eosinophils 2 %    % Basophils 1 %    % Immature Granulocytes 0 %    NRBCs per 100 WBC 0 <1 /100    Absolute Neutrophils 7.6 1.6 - 8.3 10e3/uL    Absolute Lymphocytes 2.3 0.8 - 5.3 10e3/uL    Absolute Monocytes 0.6 0.0 - 1.3 10e3/uL    Absolute Eosinophils 0.2 0.0 - 0.7 10e3/uL    Absolute Basophils 0.1 0.0 - 0.2 10e3/uL    Absolute Immature Granulocytes 0.0 <=0.4 10e3/uL    Absolute NRBCs 0.0 10e3/uL   Adult Type and Screen   Result Value Ref Range    ABO/RH(D) O POS     Antibody Screen Negative Negative    SPECIMEN EXPIRATION DATE [id number]    Glucose by meter   Result Value Ref Range    GLUCOSE BY METER POCT 150 (H) 70 - 99 mg/dL   Glucose by meter   Result Value Ref Range    GLUCOSE BY METER POCT 127 (H) 70 - 99 mg/dL   Glucose by meter   Result Value Ref Range    GLUCOSE BY METER POCT 178 (H) 70 - 99 mg/dL

## 2023-06-28 NOTE — PLAN OF CARE
Goal Outcome Evaluation:         Reason for Admission: R craniotomy    Cognitive/Mentation: A/Ox 4  Neuros/CMS: Intact   VS: stable, bradycardia and occasional oxygen sats around 89% at rest.   Pain: incision site  GI: BS active last BM 6/27. Continent.  : Continent. Heart removed today - voiding and bladder scans WNL  Pulmonary: LS clear.    Tele: sinus bradycardia.  Drains/Lines: L hand infusing, R hand saline locked  Skin: R craniotomy incision open to air; preventative mepilex  Activity: Assist x 1 with SBA.  Diet: Combo diet with thin liquids. Takes pills whole.     Therapies recs: OT home with assist  Discharge: 6/29 home with family    Aggression Stoplight Tool: green    End of shift summary: Neuros intact, vitally stable, urinating spontaneously

## 2023-06-28 NOTE — CONSULTS
[facility] ACUTE PAIN SERVICE CONSULTATION   [facility]   Text Page Pain Via [employer] [facility]    Date of Admission:  6/27/2023  Date of Consult (When I saw the patient): 06/28/23  Physician requesting consult: Joann Brunson PA-C   Service: Neurosurgery   Reason for consult:  Acute post operative pain      Assessment/Plan:     Connor Emerson is a 43 year old male who was admitted on 6/27/2023.  1 Day Post-Op for right Stealth craniotomy and resection of brain metastasis with associated vasogenic edema and EBL of 100 mL. I was asked to see the patient for acute postoperative pain with pain type musculoskeletal. History of headache began 2 to 3 weeks ago described as global.  Today states his pain is over the right parietal temporal area where the incision is.  Symptoms are fairly well controlled overall pain has minimal nausea no photophobia and no cranial nerve involvement.  He does endorse an appreciate anxiety, insomnia, and 30 pound weight loss.  After discussing with hospitalist we will treat anxiety here.  Discussed with the patient and he agrees to follow-up with his mental health provider as well as palliative care to more aggressively manage the above symptoms.  Describes pain as 3/10 and with range of 2-8/10. The patient denies chest pain shortness of breath.  The patient is on methadone and his home dose is continued which was recently increased about a week ago.  The patient does not smoke and no chemical dependency history.  The patient is obese although noted 30 pound weight loss he does endorse snoring.  Opioid tolerance is high.     Opioid Induced Respiratory Depression Risk Assessment: Mild  (Low 0-1; Moderate 2-4; or High >4 or >/= 3 if two of the risk factors are age > 60 and opioid naive) due to the following risk factors: JERROD, COPD/Asthma/pulmonary disease, CHF, renal dysfunction, hepatic dysfunction, Obesity, Smoker, Age>60, >2 opioid therapies, concomitant CNS  "depressants, opioid naive status, or post surgical ?   Chronic opioid use = 30 to 40 mg MME, opioid used this past 24 hours 53 mg MME +200 mcg fentanyl calculated from 1.4 mg of Dilaudid IV, 5 mg methadone IV.     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 Treatment plan includes multimodal pain approach, medications as listed below, reviewed.  Reviewed relative risk with opiates probable undiagnosed obstructive sleep apnea and use of benzodiazepine on respiratory suppression.  Discharge medications, advised keeping track of doses, educated on tapering off as pain improves, watch for constipation and stool softeners, no driving or alcohol with opioids, store medications in a safe place, advised to take no more than the prescribed dose as increased doses may cause respiratory depression or death. Patient is understanding of the plan. All questions and concerns addressed to patient's satisfaction.     2)Multimodal Medication Therapy  Topical: Not indicated.   Adjuvants:CrCl is 196.4 mg/dl and Tylenol 975 mg every 8 hours\", Hydroxyzine Consider adding if anxiety persists\" Gabapentin Not indicated, Muscle relaxer's Methocarbamol 750 mg 3 times daily as needed. \"  Antidepressants/anxiolytics: Continue chronic Celexa 20 mg daily  Opioids:  [id number] to 15 mg every 4 hours as needed.  Continue methadone at chronic doses 2.5 mg a.m. and 5 mg at at bedtime approximately 12 hours apart.  IV Pain medication: Dilaudid 0.2-0.4 mg every 2 hours as needed and Try to minimize & give po firstIf able  3)Non-medication interventions- Ice, Heat, physical therapy, distraction aroma therapy   4)Constipation Prophylaxis- senna and miralax. Continue to monitor.   5) Follow up   -acute pain team willsign off.   -Opioid prescriber has been Ajith Brewer MD, palliative care. PCP is Radha Shetty " Olegario.    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  He is status post op day 1 for right Stealth craniotomy and tumor resection.  This is his third cranial surgery.  The patient states he is no longer getting radiation however his pulmonologist reports that he is lung cancer is well controlled.  As a result of radiation he had some tumor necrosis.  The patient reports his headaches began to increase over the last 2 to 3 weeks, described as global with associated nausea no visual changes.  Today his pain is more focal over the right craniotomy site with minimal nausea.  Pain is described as a 2/10, focal, dull.  He denies double vision, blurred vision, field cuts, changes in hearing or difficulty swallowing.  His pain is at goal.. The patient has a high opioid tolerance on chronic methadone and as needed oral oxycodone. Opioid induced side effects including sedation, respiratory suppression, nausea, and constipation. The patient does not history of JERROD or substance use disorder.     Discussed multimodal interventions, interventions other than systemic pharmacologic treatments  Review of medical record/Summary of labs and care everywhere.   Past pain treatments have included palliative care, radiation stealth craniotomy x3, lumbar medial branch block 2013    Last UDS 2013       [place] -pulled from system on 06/28/23. Last refill on 6-20-23 oxycodone 5 mg #60, methadone 5 mg 45 tablets 6/20/2023.  Does supports analgesic of opioid use.       Medical History   has a past medical history of Atypical chest pain (12/02/2013), Cancer (H), Complication of anesthesia, Diabetes (H), GERD (gastroesophageal reflux disease) (12/02/2013), History of pulmonary embolism, HTN (hypertension) (05/14/2012), HTN, goal below 140/90 (07/02/2013), Insomnia (02/21/2012), Mediastinal lymphadenopathy, Migraine headache (07/02/2013), Migraine with aura, without mention of intractable migraine without mention of status migrainosus, and Pneumonia.       Surgical History   has a past surgical history that includes Inject block medial branch cervical/thoracic/lumbar; orthopedic surgery; Bronchoscopy Rigid Or Flexible W/Transendoscopic Endobronchial Ultrasound Guided (Bilateral, 1/26/2022); Pleuroscopy (N/A, 1/26/2022); Insert chest tube (Right, 2/16/2022); Insert chest tube (Right, 3/9/2022); IR Chest Tube Removal Tunneled Right (4/2/2022); and Optical tracking system craniotomy, excise tumor, combined (Left, 6/28/2022).     Allergies     Allergies   Allergen Reactions     Vicodin [Hydrocodone-Acetaminophen] Nausea and Vomiting and GI Disturbance        Current Home Medications   Prior to Admission medications    Medication Sig Start Date End Date Taking? Authorizing Provider   albuterol (PROAIR HFA/PROVENTIL HFA/VENTOLIN HFA) 108 (90 Base) MCG/ACT inhaler Inhale 2 puffs into the lungs every 6 hours as needed for shortness of breath, wheezing or cough 4/6/23  Yes Radha Shetty Ra, APRN CNP   citalopram (CELEXA) 20 MG tablet Take 1 tablet (20 mg) by mouth every evening 4/6/23  Yes Radha Shetty Ra, APRN CNP   dexamethasone (DECADRON) 1 MG tablet Take 1 tablet (1 mg) by mouth daily (with breakfast) 6/16/23  Yes Stephen Moran MD   hydrochlorothiazide (HYDRODIURIL) 25 MG tablet Take 1 tablet (25 mg) by mouth daily 4/6/23  Yes Radha Shetty Ra, APRN CNP   insulin aspart (NOVOLOG PEN) 100 UNIT/ML pen Inject 0-40 Units  Subcutaneous 3 times daily (before meals) Per discharge instructions sliding scale 5/20/23  Yes Paula Jacobo MD   insulin glargine (LANTUS PEN) 100 UNIT/ML pen Inject 70 Units Subcutaneous At Bedtime 5/20/23  Yes Paula Jacobo MD   lisinopril (ZESTRIL) 40 MG tablet Take 1 tablet (40 mg) by mouth daily 4/6/23  Yes Radha Shetty Ra, APRN CNP   metFORMIN (GLUCOPHAGE XR) 500 MG 24 hr tablet Take 2 tablets (1,000 mg) by mouth daily (with dinner) 6/16/23  Yes Radha Shetty Ra, APRN CNP   metFORMIN (GLUCOPHAGE XR) 500 MG 24 hr tablet Take 1 tablet (500 mg) by mouth daily (with breakfast) 5/20/23  Yes Paula Jacobo MD   methadone (DOLOPHINE) 5 MG tablet Take 0.5 tablets (2.5 mg) by mouth every morning AND 1 tablet (5 mg) At Bedtime. 6/20/23  Yes Ajith Brewer MD   naloxone (NARCAN) 4 MG/0.1ML nasal spray Spray 1 spray (4 mg) into one nostril alternating nostrils as needed for opioid reversal every 2-3 minutes until assistance arrives 3/2/23  Yes Ajith Brewer MD   oxyCODONE (ROXICODONE) 5 MG tablet Take 1-2 tablets (5-10 mg) by mouth every 4 hours as needed for moderate to severe pain 5/26/23  Yes Ajith Brewer MD   propranolol (INDERAL) 20 MG tablet Take 1 tablet (20 mg) by mouth 2 times daily 4/6/23  Yes Radha Shetty Ra, APRN CNP   zolpidem (AMBIEN) 5 MG tablet Take 1 tablet (5 mg) by mouth nightly as needed for sleep 5/8/23  Yes Ajith Brewer MD   alcohol swab prep pads Use to swab area of injection/jabier as directed. 2/24/22   Radha Shetty Ra, APRN CNP   Alcohol Swabs PADS Use to swab the area of the injection or jabier as directed. Per insurance coverage 5/20/23   Paula Jacobo MD   blood glucose (NO BRAND SPECIFIED) lancets standard To use to test glucose level in the blood Use to test blood sugar  4  times daily as directed. To accompany glucose monitor brands per insurance coverage. 5/20/23   Paula Jacobo MD   blood glucose (NO BRAND SPECIFIED) test strip To use to  test glucose level in the blood Use to test blood sugar  4 times daily as directed. To accompany glucose monitor brands per insurance coverage. 5/20/23   Paula Jacobo MD   blood glucose monitoring (NO BRAND SPECIFIED) meter device kit Use as directed. Per insurance coverage 5/20/23   Paula Jacobo MD   blood glucose monitoring (NO BRAND SPECIFIED) meter device kit Use to test blood sugar 2 times daily or as directed. Preferred blood glucose meter OR supplies to accompany: Blood Glucose Monitor Brands: per insurance. 2/24/22   Radha Shetty Ra, APRN CNP   brigatinib (ALUNBRIG) 30 MG TABS tablet Take 6 tablets (180 mg) by mouth daily 6/27/23   Bassam Persaud MD   Continuous Blood Gluc Sensor (FREESTYLE IRENE 2 SENSOR) MISC 1 each every 14 days Apply as directed per  instructions 11/17/22   Radha Shetty Ra, APRN CNP   insulin pen needle (31G X 8 MM) 31G X 8 MM miscellaneous Use one pen needles daily or as directed. 5/12/20   Jayden King PA-C   insulin pen needle (32G X 4 MM) 32G X 4 MM miscellaneous Use as directed by provider. Per insurance coverage 5/20/23   Paula Jacobo MD   Sharps Container MISC Use as directed to dispose of needles, lancets and other sharps 5/20/23   Paula Jacobo MD          Social History  Reviewed, and he  reports that he has never smoked. He has never been exposed to tobacco smoke. He has never used smokeless tobacco. He reports current alcohol use. He reports that he does not use drugs.      Family History- Reviewed care everywhere to find family history   Nothing relevant to pain consult    Reviewed, and family history includes Melanoma in his maternal uncle; Stomach Cancer in his maternal grandfather; Unknown/Adopted in his father, maternal grandfather, maternal grandmother, mother, paternal grandfather, and paternal grandmother; Uterine Cancer in his mother.    Review of Systems  Complete ROS reviewed, unless noted  , all other systems  reviewed (with patient) and all others found to be negative.         Objective:     Vitals:  B/P: 107/66, T: 97.8, P: 48, R: 11     Weight:   201 lbs 4.48 oz  Body mass index is 29.72 kg/m .      Physical Exam:     General Appearance:  Alert, cooperative, no distress, appears stated age.  Patient is pleasant, not anxious   Head:  Normocephalic, without obvious abnormality, atraumatic   Eyes:  Pupils are EOMIs.  Midposition no diplopia, blurred vision visual fields intact.   ENT/Throat: Lips and mouth are moist   Lymph/Neck: Supple, symmetrical, trachea midline, no adenopathy, thyroid: not enlarged, symmetric no cervical tenderness, suboccipital tenderness, normal range of motion.   Lungs:   Clear to auscultation bilaterally, respirations unlabored   Chest Wall:  No tenderness or deformity   Cardiovascular/Heart:  Regular rate and rhythm, S1, S2    Abdomen:   Soft, non-tender, bowel sounds active all four quadrants,  no masses, no organomegaly   Musculoskeletal: Extremities normal, atraumatic  Incision cranial right clean dry and intact   Skin: Skin color good, lesions none   Neurologic: Alert and oriented X 3, Moves all 4 extremities.  Symmetric power x4          Imaging Reviewed Personally By Myself      Results for orders placed or performed during the hospital encounter of 06/27/23   MR Brain w/o & w Contrast    Impression    IMPRESSION:  1.  Postsurgical changes of a right-sided craniotomy and debulking of the patient's known right frontal lobe mass lesion. There is a persistent rind of irregular enhancement along the anterior, medial and posterior margins of the mid to inferior   resection cavity, likely reflecting residual tumor.  2.  Grossly stable vasogenic edema surrounding the resection cavity with slight interval decrease in right to left midline shift, now measuring 1.0 cm, previously 1.2 cm.  3.  Grossly stable additional scattered intracranial metastatic lesions involving the supratentorial and  infratentorial brain.            Labs Reviewed Personally By Myself    Sodium   Date Value Ref Range Status   06/28/2023 139 136 - 145 mmol/L Final   05/12/2020 135 133 - 144 mmol/L Final     Potassium   Date Value Ref Range Status   06/28/2023 3.7 3.4 - 5.3 mmol/L Final   09/13/2022 4.2 3.4 - 5.3 mmol/L Final   05/12/2020 3.9 3.4 - 5.3 mmol/L Final     Chloride   Date Value Ref Range Status   06/28/2023 103 98 - 107 mmol/L Final   09/13/2022 103 94 - 109 mmol/L Final   05/12/2020 98 94 - 109 mmol/L Final     Carbon Dioxide   Date Value Ref Range Status   05/12/2020 28 20 - 32 mmol/L Final     Carbon Dioxide (CO2)   Date Value Ref Range Status   06/28/2023 25 22 - 29 mmol/L Final   09/13/2022 31 20 - 32 mmol/L Final     Anion Gap   Date Value Ref Range Status   06/28/2023 11 7 - 15 mmol/L Final   09/13/2022 6 3 - 14 mmol/L Final   05/12/2020 9 3 - 14 mmol/L Final     Glucose   Date Value Ref Range Status   06/28/2023 187 (H) 70 - 99 mg/dL Final   09/13/2022 423 (HH) 70 - 99 mg/dL Final   05/12/2020 490 (H) 70 - 99 mg/dL Final     Comment:     Results confirmed by repeat test  Critical Value called to and read back by  REESE GRANADO ON 05.12.20 BY KM 1340  Fasting specimen  CARSON LEDESMA 1347 5/12/20WD       GLUCOSE BY METER POCT   Date Value Ref Range Status   06/28/2023 170 (H) 70 - 99 mg/dL Final     Urea Nitrogen   Date Value Ref Range Status   06/28/2023 15.5 6.0 - 20.0 mg/dL Final   09/13/2022 15 7 - 30 mg/dL Final   05/12/2020 20 7 - 30 mg/dL Final     Creatinine   Date Value Ref Range Status   06/28/2023 0.53 (L) 0.67 - 1.17 mg/dL Final   05/12/2020 0.66 0.66 - 1.25 mg/dL Final     GFR Estimate   Date Value Ref Range Status   06/28/2023 >90 >60 mL/min/1.73m2 Final   05/12/2020 >90 >60 mL/min/[1.73_m2] Final     Comment:     Non  GFR Calc  Starting 12/18/2018, serum creatinine based estimated GFR (eGFR) will be   calculated using the Chronic Kidney Disease Epidemiology Collaboration   (CKD-EPI)  equation.       GFR, ESTIMATED POCT   Date Value Ref Range Status   09/01/2022 >60 >60 mL/min/1.73m2 Final     Calcium   Date Value Ref Range Status   06/28/2023 9.0 8.6 - 10.0 mg/dL Final   05/12/2020 9.8 8.5 - 10.1 mg/dL Final            Please see A&P for additional details of medical decision making.  MANAGEMENT DISCUSSED with the following over the past 24 hours: Cirilo Knowles RN, Mahin Larsen MD   NOTE(S)/MEDICAL RECORDS REVIEWED over the past 24 hours: Yes  Tests REVIEWED in the past 24 hours:  - See lab/imaging results included in the data section of the note  Medical complexity over the past 24 hours:  - Added lorazepam as needed, no other med changes  100 MINUTES SPENT BY ME on the date of service doing chart review, history, exam, documentation & further activities per the note.    Thank you for this consultation.      Carmen Orozco RN, PGMT-BC APRN,CNP,ACHPN   Acute Pain Team ( SD/[facility]) 8-4:30 after 3:30 page house officer   No weekend coverage   [facility] Paging/Directory Pain  Securely message with the Vocera Web Console (learn more here)

## 2023-06-28 NOTE — PROGRESS NOTES
[facility]  Neurosurgery Daily Progress Note    Assessment & Plan   Procedure(s):  Right stealth craniotomy and resection of tumor   -1 Day Post-Op  Patient was seen in ICU. Doing well. Reports some incisional pain. Pre op headaches improving. Neuro exam stable. Incision CDI. Post op MRI reviewed with Dr. Moran.     Plan:  - Okay to transfer to floor   - Decadron 4 mg q6 x 24 hours, then 4 mg BID taper to off over 2 weeks  - Routine wound care   - Continue to monitor neuro exam   - Utilize pain control measures as needed   - PT/OT   - Appreciate assistance from specialties     Discussed with Dr. Dean Joy, CNP  [facility] Neurosurgery  [facility]  [address]  [place] [address]  Tel [phone]  Pager [phone]    Interval History   Stable     Physical Exam   Temp: 97.8  F (36.6  C) Temp src: Oral BP: 107/76 Pulse: (!) 49   Resp: 16 SpO2: 94 % O2 Device: None (Room air) Oxygen Delivery: 2 LPM  Vitals:    06/27/23 1317 06/27/23 2000 06/28/23 0600   Weight: 98.9 kg (218 lb 1.6 oz) 92.7 kg (204 lb 5.9 oz) 91.3 kg (201 lb 4.5 oz)       Mental status:  Alert and oriented x 3, speech is fluent.  Cranial nerves:  II-XII intact.   Motor:  Moves all extremities with appropriate strength.    Sensation:  Intact to light touch   Coordination:  Smooth finger to nose testing.   Negative pronator drift.    Incision: CDI     Medications     niCARdipine Stopped (06/28/23 0049)     sodium chloride 75 mL/hr at 06/27/23 2034        acetaminophen  975 mg Oral Q8H     brigatinib  180 mg Oral Daily     citalopram  20 mg Oral QPM     dexamethasone  4 mg Intravenous Q6H ROBERTO    Or     dexamethasone  4 mg Oral Q6H ROBERTO     [Held by provider] hydrochlorothiazide  25 mg Oral Daily     insulin aspart   Subcutaneous TID AC     insulin aspart  1-7 Units Subcutaneous TID AC     insulin aspart  1-5 Units Subcutaneous At Bedtime     insulin glargine  56 Units  Subcutaneous At Bedtime     lisinopril  40 mg Oral Daily     methadone  2.5 mg Oral QAM     methadone  5 mg Oral At Bedtime     polyethylene glycol  17 g Oral Daily     propranolol  20 mg Oral BID     senna-docusate  1 tablet Oral BID     sodium chloride (PF)  3 mL Intracatheter Q8H

## 2023-06-28 NOTE — PROGRESS NOTES
[facility]    Medicine Progress Note - Hospitalist Service    Date of Admission:  6/27/2023    Assessment & Plan   Connor Emerson is a pleasant 43 year old male with a past medical history of insulin-dependent diabetes, hypertension, GERD, and lung cancer with metastases to brain who has been suffering from significant cerebral edema, causing headaches and difficulty with ambulation.  He was admitted by neurosurgery on 6/27/2023 and underwent a right Stealth craniotomy for resection of the lesion.  Hospitalist service was consulted for postoperative management of diabetes and chronic conditions.     Lung cancer with brain metastases, cerebral edema  Status post right Stealth craniotomy for resection of brain lesion  - For routine postoperative cares including pain management, DVT prophylaxis, neurochecks, bowel regimen, and PT/OT to orthopedic surgery  - Patient currently on dexamethasone, defer to NS for dosing     Type 2 diabetes, insulin-dependent  Hemoglobin A1c in April was 8.3.  PTA on metformin, Lantus 70 units daily, and sliding scale NovoLog with meals.  - Hold PTA metformin  - Resumed Lantus at decreased dose of 56 units evening 6/27, will try to increase back to his home regimen as able  - PTA med list states patient takes NovoLog up to 40 units at a time.  After discussion with the patient and family member, this does not seem accurate, he typically does not take that much insulin, and works out for sliding scale.  - Moderate dose SSI NovoLog ordered, can increase as necessary  - NovoLog 1 unit per 15 g carbohydrate 3 times daily with meals ordered  - Anticipate fluctuations in blood glucose as patient is on dexamethasone, may develop hyperglycemia.  He has not yet eaten, but is able to start a diet and advance as tolerated per neurosurgery.  - 6/28- sugars in mid 100s, at goal currently, monitor and titrate as needed  ADDENDUM 6/29 00:15 - sugars escalated throughout evening,  "will add lantus 9 units now and adjust it to 65 units for 6/29 PM dose    Hypertension  - BP parameters per NSG.  - For now, continue to hold PTA hydrochlorothiazide.  - Resumed PTA propranolol 20 mg twice daily, lisinopril 40 mg daily with hold parameters  -  BMP WNL this morning  ADDENDUM 6/29 00:15 - BP increased, will unhold PTA hydrochlorothiazide to be given in AM as usual (prn hydralazine already in place)        Diet: Low Consistent Carb (45 g CHO per Meal) Diet    DVT Prophylaxis: Defer to primary service  Heart Catheter: Not present  Lines: None     Cardiac Monitoring: ACTIVE order. Indication: Procedural area      Clinically Significant Risk Factors Present on Admission                  # Hypertension: Noted on problem list     # DMII: A1C = 8.3 % (Ref range: 0.0 - 5.6 %) within past 6 months    # Overweight: Estimated body mass index is 29.72 kg/m  as calculated from the following:    Height as of this encounter: 1.753 m (5' 9\").    Weight as of this encounter: 91.3 kg (201 lb 4.5 oz).            Disposition Plan      Expected Discharge Date: 06/29/2023      Destination: home with family            Mahin Larsen MD  Hospitalist Service  [facility]  Securely message with [employer] (more info)  Text page via [employer] Paging/Directory   ______________________________________________________________________    Interval History   Chart reviewed. Messaged with RN this afternoon. No new issues or complaints. Sugar and BP at goal currently, will continue to monitor.    Physical Exam   Vital Signs: Temp: 97.8  F (36.6  C) Temp src: Oral BP: 107/76 Pulse: (!) 49   Resp: 16 SpO2: 94 % O2 Device: None (Room air) Oxygen Delivery: 2 LPM  Weight: 201 lbs 4.48 oz        Medical Decision Making             Data     I have personally reviewed the following data over the past 24 hrs:    17.3 (H)  \   14.7   / 270     139 103 15.5 /  170 (H)   3.7 25 0.53 (L) \       ALT: N/A AST: N/A AP: N/A TBILI: " N/A   ALB: N/A TOT PROTEIN: N/A LIPASE: 19

## 2023-06-29 ENCOUNTER — APPOINTMENT (OUTPATIENT)
Dept: OCCUPATIONAL THERAPY | Facility: CLINIC | Age: 43
End: 2023-06-29
Attending: NEUROLOGICAL SURGERY
Payer: COMMERCIAL

## 2023-06-29 ENCOUNTER — APPOINTMENT (OUTPATIENT)
Dept: RADIATION ONCOLOGY | Facility: CLINIC | Age: 43
End: 2023-06-29
Attending: FAMILY MEDICINE
Payer: MEDICAID

## 2023-06-29 VITALS
OXYGEN SATURATION: 92 % | HEIGHT: 69 IN | BODY MASS INDEX: 29.81 KG/M2 | SYSTOLIC BLOOD PRESSURE: 137 MMHG | WEIGHT: 201.28 LBS | TEMPERATURE: 98.3 F | RESPIRATION RATE: 16 BRPM | HEART RATE: 66 BPM | DIASTOLIC BLOOD PRESSURE: 88 MMHG

## 2023-06-29 LAB
GLUCOSE BLDC GLUCOMTR-MCNC: 111 MG/DL (ref 70–99)
GLUCOSE BLDC GLUCOMTR-MCNC: 231 MG/DL (ref 70–99)
PATH REPORT.COMMENTS IMP SPEC: NORMAL
PATH REPORT.COMMENTS IMP SPEC: NORMAL
PATH REPORT.FINAL DX SPEC: NORMAL
PATH REPORT.GROSS SPEC: NORMAL
PATH REPORT.MICROSCOPIC SPEC OTHER STN: NORMAL
PATH REPORT.RELEVANT HX SPEC: NORMAL
PHOTO IMAGE: NORMAL

## 2023-06-29 PROCEDURE — 250N000012 HC RX MED GY IP 250 OP 636 PS 637: Performed by: NURSE PRACTITIONER

## 2023-06-29 PROCEDURE — 250N000013 HC RX MED GY IP 250 OP 250 PS 637: Performed by: PHYSICIAN ASSISTANT

## 2023-06-29 PROCEDURE — 97535 SELF CARE MNGMENT TRAINING: CPT | Mod: GO

## 2023-06-29 PROCEDURE — 250N000011 HC RX IP 250 OP 636: Mod: JZ | Performed by: PHYSICIAN ASSISTANT

## 2023-06-29 PROCEDURE — 99207 PR NO BILLABLE SERVICE THIS VISIT: CPT | Performed by: HOSPITALIST

## 2023-06-29 RX ORDER — DEXAMETHASONE 2 MG/1
2 TABLET ORAL 2 TIMES DAILY
Qty: 8 TABLET | Refills: 0 | Status: SHIPPED | OUTPATIENT
Start: 2023-07-03 | End: 2023-07-13

## 2023-06-29 RX ORDER — DEXAMETHASONE 2 MG/1
2 TABLET ORAL DAILY
Qty: 3 TABLET | Refills: 0 | Status: SHIPPED | OUTPATIENT
Start: 2023-07-07 | End: 2023-07-13

## 2023-06-29 RX ORDER — METHOCARBAMOL 750 MG/1
750 TABLET, FILM COATED ORAL EVERY 6 HOURS PRN
Qty: 40 TABLET | Refills: 0 | Status: SHIPPED | OUTPATIENT
Start: 2023-06-29 | End: 2023-11-17

## 2023-06-29 RX ORDER — DEXAMETHASONE 1 MG
1 TABLET ORAL DAILY
Qty: 3 TABLET | Refills: 0 | Status: SHIPPED | OUTPATIENT
Start: 2023-07-10 | End: 2023-08-16

## 2023-06-29 RX ORDER — AMOXICILLIN 250 MG
1 CAPSULE ORAL 2 TIMES DAILY PRN
Qty: 40 TABLET | Refills: 0 | Status: SHIPPED | OUTPATIENT
Start: 2023-06-29 | End: 2023-11-17

## 2023-06-29 RX ORDER — OXYCODONE HYDROCHLORIDE 10 MG/1
10 TABLET ORAL
Qty: 40 TABLET | Refills: 0 | Status: SHIPPED | OUTPATIENT
Start: 2023-06-29 | End: 2023-07-04

## 2023-06-29 RX ORDER — ACETAMINOPHEN 325 MG/1
975 TABLET ORAL EVERY 8 HOURS
Qty: 40 TABLET | Refills: 0 | Status: SHIPPED | OUTPATIENT
Start: 2023-06-29 | End: 2023-07-04

## 2023-06-29 RX ORDER — DEXAMETHASONE 4 MG/1
4 TABLET ORAL 2 TIMES DAILY
Qty: 8 TABLET | Refills: 0 | Status: SHIPPED | OUTPATIENT
Start: 2023-06-29 | End: 2023-07-13

## 2023-06-29 RX ADMIN — PROPRANOLOL HYDROCHLORIDE 20 MG: 20 TABLET ORAL at 08:09

## 2023-06-29 RX ADMIN — INSULIN ASPART 2 UNITS: 100 INJECTION, SOLUTION INTRAVENOUS; SUBCUTANEOUS at 13:26

## 2023-06-29 RX ADMIN — DEXAMETHASONE 4 MG: 4 TABLET ORAL at 08:08

## 2023-06-29 RX ADMIN — OXYCODONE HYDROCHLORIDE 15 MG: 5 TABLET ORAL at 02:25

## 2023-06-29 RX ADMIN — OXYCODONE HYDROCHLORIDE 10 MG: 5 TABLET ORAL at 09:25

## 2023-06-29 RX ADMIN — LISINOPRIL 40 MG: 40 TABLET ORAL at 08:08

## 2023-06-29 RX ADMIN — ACETAMINOPHEN 975 MG: 325 TABLET, FILM COATED ORAL at 06:04

## 2023-06-29 RX ADMIN — ACETAMINOPHEN 975 MG: 325 TABLET, FILM COATED ORAL at 12:46

## 2023-06-29 RX ADMIN — SENNOSIDES AND DOCUSATE SODIUM 1 TABLET: 50; 8.6 TABLET ORAL at 08:09

## 2023-06-29 RX ADMIN — HYDRALAZINE HYDROCHLORIDE 10 MG: 20 INJECTION INTRAMUSCULAR; INTRAVENOUS at 09:25

## 2023-06-29 RX ADMIN — POLYETHYLENE GLYCOL 3350 17 G: 17 POWDER, FOR SOLUTION ORAL at 08:10

## 2023-06-29 RX ADMIN — METHADONE HYDROCHLORIDE 2.5 MG: 5 TABLET ORAL at 08:08

## 2023-06-29 RX ADMIN — HYDROCHLOROTHIAZIDE 25 MG: 25 TABLET ORAL at 08:09

## 2023-06-29 ASSESSMENT — ACTIVITIES OF DAILY LIVING (ADL)
ADLS_ACUITY_SCORE: 20

## 2023-06-29 NOTE — PROVIDER NOTIFICATION
" Kvng messaged Dr. Larsen    \"neurosurgery okay with pt discharging today, however wants to clear with you. Pt is hypertensive, just gave IV hydralazine x1 and pain meds. Pt does have a home BP cuff. Okay to stop IV fluids? [id number]\"      \"okay to stop IV fluids\"   "

## 2023-06-29 NOTE — PLAN OF CARE
Occupational Therapy Discharge Summary    Reason for therapy discharge:    All goals and outcomes met, no further needs identified.    Progress towards therapy goal(s). See goals on Care Plan in [facility] electronic health record for goal details.  Goals met    Therapy recommendation(s):    Continued therapy is recommended.  Rationale/Recommendations:  Pt functioning near baseline, limited by post-op restrictions, cognitive deficits. Anticipate he will be safe to d/c home w/ family assist for bathing, heavy IADL tasks, meds and driving - pt agreeable to this. Rec OP OT to further address high level cognition and for return to work..

## 2023-06-29 NOTE — TELEPHONE ENCOUNTER
Prior Authorization Approval    Medication: ALUNBRIG 30 MG PO TABS  Authorization Effective Date: 3/29/2023  Authorization Expiration Date: 9/27/2023  Approved Dose/Quantity: 6/day  Reference #: [id number]   Insurance Company: [employer] - Phone [phone] Fax [phone]  Expected CoPay:       CoPay Card Available:      Financial Assistance Needed: no  Which Pharmacy is filling the prescription: [facility] - [place], [place] - [facility] [address]   Pharmacy Notified:    Patient Notified:          Juliette Marquez CPhT  [facility]  Oncology Pharmacy Liaison II  [email or web address]  [phone] (phone  [phone] (fax

## 2023-06-29 NOTE — PLAN OF CARE
A&Ox4, neurologically intact, incision WNL, localized swelling. Discharge home with assist and outpatient OT. Pain well-controlled. Discharge instructions reviewed with patient and spouse, questions answered, belongings sent with patient.

## 2023-06-29 NOTE — PLAN OF CARE
Pt here POD #2 R stealth crani with tumor resection, hx metastatic lung cancer. Pt has own supply of PO chemo med. A&Ox4. Neuros intact. VSS, SBP <150. Tele SR. Reg diet, thin liquids. CHO count with sliding scale insulin coverage. Blood glucose elevated, Lantus increased to 65 units at bedtime. Takes pills whole. Up with SBA. Scheduled tylenol and PRN oxycodone given for moderate incisional pain. NS infusing at 75ml/hr. Continent, voiding. Pt ambulated to bathroom with SBA. Pt scoring green on the Aggression Stop Light Tool. Plan for decadron taper. Discharge pending.

## 2023-06-29 NOTE — DISCHARGE SUMMARY
[facility]    Discharge Summary   [facility]    Date of Admission:  6/27/2023  Date of Discharge:  6/29/2023  Discharging Provider: Marva Coronel PA-C  Date of Service (when I saw the patient): 06/29/23    Discharge Diagnoses   Principal Problem:    Brain tumor (H)    History of Present Illness   Connor Emerson is an 43-year-old male with metastatic lung cancer, who has had previously treated brain metastasis who had worsening of a right frontal metastasis with increasing vasogenic edema and brain swelling with worsening brain compression.  He was brought for debulking and pathologic diagnosis.    Hospital Course   Connor Emerson was admitted on 6/27/2023. Connor Emerson is an 43-year-old male with metastatic lung cancer, who has had previously treated brain metastasis who had worsening of a right frontal metastasis with increasing vasogenic edema and brain swelling with worsening brain compression.  He was brought for debulking and pathologic diagnosis.    On 06/27/2023, patient underwent right Stealth craniotomy and resection of brain metastasis with Dr Moran. No intraoperative complications.  ml. Patient admitted for pain management, neurologic monitoring, wound care. Patient meeting all discharge on 06/29/2023. Patient was cleared by Neurosurgery for discharge to home in stable condition.     Plan:   - Therapies evaluated and recommend discharge to home with assist  - Routine post op care plan  - Routine wound care and activity restrictions  - Pain medications prescribed at discharge  - Follow up with NSG clinic as scheduled     I have discussed the following assessment and plan with DR. Moran who is in agreement with initial plan and will follow up with any further recommendations.    Marva Coronel PA-C  [facility]  [address] [address]    Tel [phone]  Pager  [phone]      Pending Results   These results will be followed up by Dr. Moran  Unresulted Labs Ordered in the Past 30 Days of this Admission     Date and Time Order Name Status Description    6/27/2023  4:50 PM Surgical Pathology Exam In process           Code Status   Full Code    Primary Care Physician   Radha Shetty    Physical Exam   Temp: 98.3  F (36.8  C) Temp src: Oral BP: 137/88 Pulse: 66   Resp: 16 SpO2: 92 % O2 Device: None (Room air)    Vitals:    06/27/23 1317 06/27/23 2000 06/28/23 0600   Weight: 218 lb 1.6 oz (98.9 kg) 204 lb 5.9 oz (92.7 kg) 201 lb 4.5 oz (91.3 kg)     Vital Signs with Ranges  Temp:  [98.1  F (36.7  C)-98.5  F (36.9  C)] 98.3  F (36.8  C)  Pulse:  [] 66  Resp:  [10-23] 16  BP: (120-174)/() 137/88  SpO2:  [92 %-99 %] 92 %  I/O last 3 completed shifts:  In: 750 [P.O.:300; I.V.:450]  Out: 60 [Urine:60]    Awake, alert, appropriate  II-XII grossly intact  Incision (right sided cranial) c/d/i   5/5 motor strength BUE and BLE   Negative pronator drift  Negative clonus       Time Spent on this Encounter   IMarva PA-C, personally saw the patient today and spent less than or equal to 30 minutes discharging this patient.    Discharge Disposition   Discharged to home  Condition at discharge: Stable    Consultations This Hospital Stay   HOSPITALIST IP CONSULT  PHYSICAL THERAPY ADULT IP CONSULT  OCCUPATIONAL THERAPY ADULT IP CONSULT  PAIN MANAGEMENT ADULT IP CONSULT    Discharge Orders      Medication Therapy Management Referral      Reason for your hospital stay    Right stealth craniotomy and resection of tumor     Follow-up and recommended labs and tests     Your Neurosurgical follow up appointments have been scheduled. You may call [phone] to make, confirm or change your follow-up Neurosurgery appointment dates and/or times.     Activity    Your activity upon discharge: Please limit your lifting to no more that ten pounds and avoid excessive bending,  twisting and turning at the lumbar spine. You should also avoid excessive jostling and jarring activities.     Wound care and dressings    Instructions to care for your wound at home: Keep your incision clean and dry. Okay to shower on post-op day 2. Okay for water to run over incision and gently pat dry. Do not scrub incision. No bathing, swimming, or submerging incision under water until follow-up visit. Call clinic or go to the ER with any incision drainage or swelling. Follow-up in clinic at 2 weeks post op for incision check.     Diet    Follow this diet upon discharge: Regular     Discharge Medications   Current Discharge Medication List      START taking these medications    Details   acetaminophen (TYLENOL) 325 MG tablet Take 3 tablets (975 mg) by mouth every 8 hours  Qty: 40 tablet, Refills: 0    Associated Diagnoses: Brain tumor (H)      methocarbamol (ROBAXIN) 750 MG tablet Take 1 tablet (750 mg) by mouth every 6 hours as needed for muscle spasms  Qty: 40 tablet, Refills: 0    Associated Diagnoses: Brain tumor (H)      senna-docusate (SENOKOT-S/PERICOLACE) 8.6-50 MG tablet Take 1 tablet by mouth 2 times daily as needed for constipation (take while on oxycodone)  Qty: 40 tablet, Refills: 0    Associated Diagnoses: Brain tumor (H)         CONTINUE these medications which have CHANGED    Details   !! dexamethasone (DECADRON) 1 MG tablet Take 1 tablet (1 mg) by mouth daily for 3 days  Qty: 3 tablet, Refills: 0    Associated Diagnoses: Brain tumor (H)      !! dexamethasone (DECADRON) 2 MG tablet Take 1 tablet (2 mg) by mouth 2 times daily for 4 days  Qty: 8 tablet, Refills: 0    Associated Diagnoses: Brain tumor (H)      !! dexamethasone (DECADRON) 2 MG tablet Take 1 tablet (2 mg) by mouth daily for 3 days  Qty: 3 tablet, Refills: 0    Associated Diagnoses: Brain tumor (H)      !! dexamethasone (DECADRON) 4 MG tablet Take 1 tablet (4 mg) by mouth 2 times daily for 4 days  Qty: 8 tablet, Refills: 0     Associated Diagnoses: Brain tumor (H)      oxyCODONE (ROXICODONE) 10 MG tablet Take 1 tablet (10 mg) by mouth every 3 hours as needed for moderate pain  Qty: 40 tablet, Refills: 0    Associated Diagnoses: Brain tumor (H)       !! - Potential duplicate medications found. Please discuss with provider.      CONTINUE these medications which have NOT CHANGED    Details   albuterol (PROAIR HFA/PROVENTIL HFA/VENTOLIN HFA) 108 (90 Base) MCG/ACT inhaler Inhale 2 puffs into the lungs every 6 hours as needed for shortness of breath, wheezing or cough  Qty: 18 g, Refills: 0    Comments: Pharmacy may dispense brand covered by insurance (Proair, or proventil or ventolin or generic albuterol inhaler)  Associated Diagnoses: Non-small cell lung cancer, unspecified laterality (H)      citalopram (CELEXA) 20 MG tablet Take 1 tablet (20 mg) by mouth every evening  Qty: 90 tablet, Refills: 1    Associated Diagnoses: Adjustment disorder with mixed anxiety and depressed mood      hydrochlorothiazide (HYDRODIURIL) 25 MG tablet Take 1 tablet (25 mg) by mouth daily  Qty: 90 tablet, Refills: 1    Associated Diagnoses: Primary hypertension      insulin aspart (NOVOLOG PEN) 100 UNIT/ML pen Inject 0-40 Units Subcutaneous 3 times daily (before meals) Per discharge instructions sliding scale  Qty: 45 mL, Refills: 2    Associated Diagnoses: Type 2 diabetes mellitus with hyperglycemia, with long-term current use of insulin (H)      insulin glargine (LANTUS PEN) 100 UNIT/ML pen Inject 70 Units Subcutaneous At Bedtime  Qty: 30 mL, Refills: 2    Comments: If Lantus is not covered by insurance, may substitute Basaglar or Semglee or other insulin glargine product per insurance preference at same dose and frequency.    Associated Diagnoses: Type 2 diabetes mellitus with hyperglycemia, with long-term current use of insulin (H)      lisinopril (ZESTRIL) 40 MG tablet Take 1 tablet (40 mg) by mouth daily  Qty: 90 tablet, Refills: 1      !! metFORMIN  (GLUCOPHAGE XR) 500 MG 24 hr tablet Take 2 tablets (1,000 mg) by mouth daily (with dinner)  Qty: 180 tablet, Refills: 0    Associated Diagnoses: Type 2 diabetes mellitus with hyperglycemia, with long-term current use of insulin (H)      !! metFORMIN (GLUCOPHAGE XR) 500 MG 24 hr tablet Take 1 tablet (500 mg) by mouth daily (with breakfast)  Qty: 270 tablet, Refills: 1    Associated Diagnoses: Type 2 diabetes mellitus with hyperglycemia, with long-term current use of insulin (H)      methadone (DOLOPHINE) 5 MG tablet Take 0.5 tablets (2.5 mg) by mouth every morning AND 1 tablet (5 mg) At Bedtime.  Qty: 45 tablet, Refills: 0    Associated Diagnoses: Cancer associated pain      naloxone (NARCAN) 4 MG/0.1ML nasal spray Spray 1 spray (4 mg) into one nostril alternating nostrils as needed for opioid reversal every 2-3 minutes until assistance arrives  Qty: 0.2 mL, Refills: 3    Associated Diagnoses: Cancer associated pain      propranolol (INDERAL) 20 MG tablet Take 1 tablet (20 mg) by mouth 2 times daily  Qty: 180 tablet, Refills: 1    Associated Diagnoses: Nonintractable episodic headache, unspecified headache type      zolpidem (AMBIEN) 5 MG tablet Take 1 tablet (5 mg) by mouth nightly as needed for sleep  Qty: 20 tablet, Refills: 0    Associated Diagnoses: Insomnia      !! alcohol swab prep pads Use to swab area of injection/jabier as directed.  Qty: 100 each, Refills: 3    Associated Diagnoses: Type 2 diabetes mellitus with hyperglycemia, with long-term current use of insulin (H)      !! Alcohol Swabs PADS Use to swab the area of the injection or jabier as directed. Per insurance coverage  Qty: 100 each, Refills: 0    Associated Diagnoses: Type 2 diabetes mellitus with hyperglycemia, without long-term current use of insulin (H)      blood glucose (NO BRAND SPECIFIED) lancets standard To use to test glucose level in the blood Use to test blood sugar  4  times daily as directed. To accompany glucose monitor brands per  insurance coverage.  Qty: 100 each, Refills: 3    Associated Diagnoses: Type 2 diabetes mellitus with hyperglycemia, without long-term current use of insulin (H)      blood glucose (NO BRAND SPECIFIED) test strip To use to test glucose level in the blood Use to test blood sugar  4 times daily as directed. To accompany glucose monitor brands per insurance coverage.  Qty: 100 strip, Refills: 3    Associated Diagnoses: Type 2 diabetes mellitus with hyperglycemia, without long-term current use of insulin (H)      !! blood glucose monitoring (NO BRAND SPECIFIED) meter device kit Use as directed. Per insurance coverage  Qty: 1 kit, Refills: 0    Associated Diagnoses: Type 2 diabetes mellitus with hyperglycemia, without long-term current use of insulin (H)      !! blood glucose monitoring (NO BRAND SPECIFIED) meter device kit Use to test blood sugar 2 times daily or as directed. Preferred blood glucose meter OR supplies to accompany: Blood Glucose Monitor Brands: per insurance.  Qty: 1 kit, Refills: 0    Associated Diagnoses: Type 2 diabetes mellitus with hyperglycemia, with long-term current use of insulin (H)      brigatinib (ALUNBRIG) 30 MG TABS tablet Take 6 tablets (180 mg) by mouth daily  Qty: 180 tablet, Refills: 0    Comments: May be taken with or without food.  Swallow whole.  Do not crush or chew tablets.  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 insulin pen needle (31G X 8 MM) 31G X 8 MM miscellaneous Use one pen needles daily or as directed.  Qty: 100 each, Refills: 0    Associated Diagnoses: Type 2 diabetes mellitus without complication (H)      !! insulin pen  needle (32G X 4 MM) 32G X 4 MM miscellaneous Use as directed by provider. Per insurance coverage  Qty: 100 each, Refills: 0    Associated Diagnoses: Type 2 diabetes mellitus with hyperglycemia, without long-term current use of insulin (H)      Sharps Container MISC Use as directed to dispose of needles, lancets and other sharps  Qty: 1 each, Refills: 0    Associated Diagnoses: Type 2 diabetes mellitus with hyperglycemia, without long-term current use of insulin (H)       !! - Potential duplicate medications found. Please discuss with provider.        Allergies   Allergies   Allergen Reactions     Vicodin [Hydrocodone-Acetaminophen] Nausea and Vomiting and GI Disturbance

## 2023-06-29 NOTE — PROGRESS NOTES
Brief non billing hospitalist consult follow up note      Blood sugars improved this morning - recommend resuming home regimen and follow up with PCP as needed/scheduled.    BP elevated but now improving home regimen in place - pt has home BP cuff and can check at home and follow up with PCP. If BP improves and primary team is planning to discharge, he is ok from hospitalist standpoint.     Update - midday - BP improved, seems to correlate with pain control. Can check at home/clinic. Discussed with bedside RNs.

## 2023-06-30 ENCOUNTER — PATIENT OUTREACH (OUTPATIENT)
Dept: CARE COORDINATION | Facility: CLINIC | Age: 43
End: 2023-06-30

## 2023-06-30 NOTE — PROGRESS NOTES
[facility]: Post-Discharge Note  SITUATION                                                      Admission:    Admission Date: 06/27/23   Reason for Admission: debulking and pathologic diagnosis  Discharge:   Discharge Date: 06/29/23  Discharge Diagnosis: Brain tumor    BACKGROUND                                                      Per hospital discharge summary and inpatient provider notes:  Connor Emerson was admitted on 6/27/2023. Connor Emerson is an 43-year-old male with metastatic lung cancer, who has had previously treated brain metastasis who had worsening of a right frontal metastasis with increasing vasogenic edema and brain swelling with worsening brain compression.  He was brought for debulking and pathologic diagnosis.     On 06/27/2023, patient underwent right Stealth craniotomy and resection of brain metastasis with Dr Moran. No intraoperative complications.  ml. Patient admitted for pain management, neurologic monitoring, wound care. Patient meeting all discharge on 06/29/2023. Patient was cleared by Neurosurgery for discharge to home in stable condition.      Plan:   - Therapies evaluated and recommend discharge to home with assist  - Routine post op care plan  - Routine wound care and activity restrictions  - Pain medications prescribed at discharge  - Follow up with NSG clinic as scheduled      I have discussed the following assessment and plan with DR. Moran who is in agreement with initial plan and will follow up with any further recommendations.     Marva AMEZCUA [facility] [place] [address]     Tel [phone]  Pager [phone]    ASSESSMENT        Discharge Assessment  How are you doing now that you are home?: Feels good; in good spirits.  How are your symptoms? (Red Flag symptoms escalate to triage hotline per guidelines): Improved  Do you feel your condition is stable enough to be safe at  home until your provider visit?: Yes  Does the patient have their discharge instructions? : Yes  Does the patient have questions regarding their discharge instructions? : No  Were you started on any new medications or were there changes to any of your previous medications? : Yes  Does the patient have all of their medications?: Yes  Do you have questions regarding any of your medications? : No  Do you have all of your needed medical supplies or equipment (DME)?  (i.e. oxygen tank, CPAP, cane, etc.): Yes  Discharge follow-up appointment scheduled within 14 calendar days? : Yes  Discharge Follow Up Appointment Date: 07/03/23  Discharge Follow Up Appointment Scheduled with?: Specialty Care Provider    Post-op ([facility] Only)  If the patient had a surgery or procedure, do they have any questions for a nurse?: No      PLAN                                                      Outpatient Plan:  Your Neurosurgical follow up appointments have been scheduled. You may call [phone] to make, confirm or change your follow-up Neurosurgery appointment dates and/or times.    Future Appointments   Date Time Provider Department [place]   7/3/2023  2:00 PM Lanie Crump LICSW [facility]   7/10/2023  3:20 PM RSCCCT1 RHSCCT [facility]   7/12/2023 11:00 AM Nurse,  Spine/Brain RHSBRS [facility]   7/12/2023  4:30 PM Bassam Persaud MD [facility]   7/24/2023  1:00 PM Lanie Crump LICSW [facility]   8/8/2023  1:30 PM Alex Oliveros PA-C [facility]   8/21/2023  1:00 PM Lanie Crump LICSW [facility]   9/27/2023  1:30 PM Alex Oliveros PA-C [facility]         For any urgent concerns, please contact our 24 hour nurse triage line: [phone] ([phone])         SARAH Roque  [phone]  [facility]

## 2023-07-03 ENCOUNTER — VIRTUAL VISIT (OUTPATIENT)
Dept: ONCOLOGY | Facility: CLINIC | Age: 43
End: 2023-07-03
Payer: COMMERCIAL

## 2023-07-03 DIAGNOSIS — D49.6 BRAIN TUMOR (H): ICD-10-CM

## 2023-07-03 DIAGNOSIS — F41.9 ANXIETY: Primary | ICD-10-CM

## 2023-07-03 PROCEDURE — 99207 PR NO CHARGE LOS: CPT | Mod: VID | Performed by: SOCIAL WORKER

## 2023-07-03 NOTE — LETTER
7/3/2023         RE: Connor Emerson  [address]  [place] [address]        Dear Colleague,    Thank you for referring your patient, Connor Emerson, to the [facility]. Please see a copy of my visit note below.    Telemedicine Visit: The patient's condition can be safely assessed and treated via synchronous audio and visual telemedicine encounter.      Reason for Telemedicine Visit: Patient has requested telehealth visit    Originating Site (Patient Location): Patient's place of employment        Distant Location (provider location):  Off-site    Consent:  The patient/guardian has verbally consented to: the potential risks and benefits of telemedicine (video visit) versus in person care; bill my insurance or make self-payment for services provided; and responsibility for payment of non-covered services.     Mode of Communication:  Video Conference via [employer]    As the provider I attest to compliance with applicable laws and regulations related to telemedicine.     Palliative Care Clinical Social Work Return Appointment    PLEASE NOTE:  THIS IS A MENTAL HEALTH NOTE.  OTHER PROVIDERS VIEWING THIS NOTE SHOULD USE THIS ONLY FOR UNDERSTANDING THE CONTEXT OF THE PATIENT'S EXPERIENCE.  TOPICS DESCRIBED IN THIS NOTE SHOULD NOT BE REFERENCED TO THE PATIENT BY MEDICAL PROVIDERS.    Connor Emerson is a 43 year old man with diagnosis of metastatic lung cancer, seen today via [employer] video for a return psychotherapy appointment to address adjustment disorder as it relates to coping with illness and treatment.  Visit started as video but then switched to phone after about 2 minutes due to issues with the connection     Mental Status Exam:(List all that apply)      Appearance: Appropriate      Eye Contact: Good       Orientation: Yes, x4      Mood: Normal  Fatigued         Affect: difficult to assess via phone       Thought Content: Clear         Thought Form: Logical       "Psychomotor Behavior: Normal    Visit themes:     Adjustment to Illness: Had debulking surgery last week, was discharged on Wednesday and returned to work on Friday. Feels fatigued.  Did not provide much information when I asked what he is hearing from his doctors.      In tryign to reach him by phone I called his wife's number -- she shared that she is noticing more cognitive changes and wonders if these will improve following the surgery.     Mental Health (thoughts, feelings, actions, coping, and symptoms): Following our last appointment Connor started lexapro after talking with Dr. Duval.  He has not noticed a difference in mood.  He describes feeling \"one hundred percent anxiety all the time\"     Helpful activities:     Helpful cognitions:     Unhelpful and/or triggering or exacerbating factors:  Recent surgery followed by return to work.  I assume this choice was made because he is the primary income provider for his family.     Body-Mind Skills Education:     Relationships:     End of Life and Advance Care Planning:     Main therapeutic interventions provided this session include:   Facilitate structured problem solving    Plan:  Connor asked that I reach out to the medical team to forward his questions about his symptoms and his care.  Message sent via [employer] to Dr. Brewer and Rn CC    Time spent with patient/family:  12 minutes  (Start 2:10, end 2:22)    LISETH Barahona, [facility]   Palliative Care Clinical   Ph: [phone]      DO NOT SEND ANY LETTERS                    Again, thank you for allowing me to participate in the care of your patient.        Sincerely,        EDUIN Barahona    "

## 2023-07-03 NOTE — PROGRESS NOTES
Telemedicine Visit: The patient's condition can be safely assessed and treated via synchronous audio and visual telemedicine encounter.      Reason for Telemedicine Visit: Patient has requested telehealth visit    Originating Site (Patient Location): Patient's place of employment        Distant Location (provider location):  Off-site    Consent:  The patient/guardian has verbally consented to: the potential risks and benefits of telemedicine (video visit) versus in person care; bill my insurance or make self-payment for services provided; and responsibility for payment of non-covered services.     Mode of Communication:  Video Conference via [employer]    As the provider I attest to compliance with applicable laws and regulations related to telemedicine.     Palliative Care Clinical Social Work Return Appointment    PLEASE NOTE:  THIS IS A MENTAL HEALTH NOTE.  OTHER PROVIDERS VIEWING THIS NOTE SHOULD USE THIS ONLY FOR UNDERSTANDING THE CONTEXT OF THE PATIENT'S EXPERIENCE.  TOPICS DESCRIBED IN THIS NOTE SHOULD NOT BE REFERENCED TO THE PATIENT BY MEDICAL PROVIDERS.    Connor Emerson is a 43 year old man with diagnosis of metastatic lung cancer, seen today via [employer] video for a return psychotherapy appointment to address adjustment disorder as it relates to coping with illness and treatment.  Visit started as video but then switched to phone after about 2 minutes due to issues with the connection     Mental Status Exam:(List all that apply)      Appearance: Appropriate      Eye Contact: Good       Orientation: Yes, x4      Mood: Normal  Fatigued         Affect: difficult to assess via phone       Thought Content: Clear         Thought Form: Logical      Psychomotor Behavior: Normal    Visit themes:     Adjustment to Illness: Had debulking surgery last week, was discharged on Wednesday and returned to work on Friday. Feels fatigued.  Did not provide much information when I asked what he is hearing from his doctors.   "    In tryign to reach him by phone I called his wife's number -- she shared that she is noticing more cognitive changes and wonders if these will improve following the surgery.     Mental Health (thoughts, feelings, actions, coping, and symptoms): Following our last appointment Connor started lexapro after talking with Dr. Duval.  He has not noticed a difference in mood.  He describes feeling \"one hundred percent anxiety all the time\"     Helpful activities:     Helpful cognitions:     Unhelpful and/or triggering or exacerbating factors:  Recent surgery followed by return to work.  I assume this choice was made because he is the primary income provider for his family.     Body-Mind Skills Education:     Relationships:     End of Life and Advance Care Planning:     Main therapeutic interventions provided this session include:   Facilitate structured problem solving    Plan:  Connor asked that I reach out to the medical team to forward his questions about his symptoms and his care.  Message sent via [employer] to Dr. Brewer and Rn CC    Time spent with patient/family:  12 minutes  (Start 2:10, end 2:22)    LISETH Barahona, [facility]   Palliative Care Clinical   Ph: [phone]      DO NOT SEND ANY LETTERS                "

## 2023-07-04 ENCOUNTER — MYC REFILL (OUTPATIENT)
Dept: FAMILY MEDICINE | Facility: CLINIC | Age: 43
End: 2023-07-04
Payer: MEDICAID

## 2023-07-04 DIAGNOSIS — D49.6 BRAIN TUMOR (H): ICD-10-CM

## 2023-07-05 ENCOUNTER — TELEPHONE (OUTPATIENT)
Dept: NEUROLOGY | Facility: CLINIC | Age: 43
End: 2023-07-05
Payer: MEDICAID

## 2023-07-05 RX ORDER — OXYCODONE HYDROCHLORIDE 10 MG/1
10 TABLET ORAL
Qty: 40 TABLET | Refills: 0 | Status: SHIPPED | OUTPATIENT
Start: 2023-07-05 | End: 2023-07-13

## 2023-07-05 RX ORDER — ACETAMINOPHEN 325 MG/1
975 TABLET ORAL EVERY 8 HOURS
Qty: 40 TABLET | Refills: 0 | Status: SHIPPED | OUTPATIENT
Start: 2023-07-05 | End: 2023-07-18

## 2023-07-05 NOTE — TELEPHONE ENCOUNTER
Patient requesting refill of oxycodone and tylenol.     DOS: 6/27/23   Surgeon:Dr. Moran  Procedure:Right stealth craniotomy and resection of tumor  Weeks Post op: 1 week 1 day     Last refilled: oxy 6/29 #40 tylenol 6/29 #40    Pain assessment completed via [employer]. Please see encounter for further details. Refill request will be forwarded to care team.

## 2023-07-06 ENCOUNTER — TELEPHONE (OUTPATIENT)
Dept: FAMILY MEDICINE | Facility: CLINIC | Age: 43
End: 2023-07-06
Payer: MEDICAID

## 2023-07-06 ENCOUNTER — TELEPHONE (OUTPATIENT)
Dept: PALLIATIVE CARE | Facility: CLINIC | Age: 43
End: 2023-07-06
Payer: MEDICAID

## 2023-07-06 NOTE — TELEPHONE ENCOUNTER
Patient insurance max of 6 tabs per day.   Unsure if you would like to start a prior auth for this or if you are okay with us just filling for 7 days and must last then 7 days. Or how you would like to do this.    Prior Authorization Retail Medication Request    Medication/Dose: Oxycodone 5  ICD code (if different than what is on RX):    Previously Tried and Failed:    Rationale:      Insurance Name:  [facility] [place] [facility]  Insurance ID:  [id number]      Pharmacy Information (if different than what is on RX)  Zoe Fernandez CPhT  [facility]  [id number] [place] [address] [phone]

## 2023-07-06 NOTE — TELEPHONE ENCOUNTER
Central Prior Authorization Team   Phone: [phone]    PA Initiation    Medication: PA for Oxycodone  Insurance Company: [employer] - Phone [phone] Fax [phone]  Pharmacy Filling the Rx: [place], [place] - [address]  Filling Pharmacy Phone: [phone]  Filling Pharmacy Fax:    Start Date: 7/6/2023    Insurance requires form filled out. MME is over 90, MME override form filled out and faxed.    Filled out form, faxed back with urgent status request.

## 2023-07-07 NOTE — TELEPHONE ENCOUNTER
Insurance cancelled my PA request due to paid claim at the pharmacy for 7 days supply.   Called pharmacy, let them know they need to run a claim showing a rejection.  They filled the qty 40 tabs for 7 days, the Rx is written for qty 40 for a 5 days supply.     Pharmacy needed to get patient the medication because he was out so that is why they ran it for 7 days supply.   Asked the tech to process a new claim through insurance for a 5 days supply so the insurance can see the claim when I re-submit request.     Re-submitting now for qty 40 for 5 days supply.

## 2023-07-07 NOTE — TELEPHONE ENCOUNTER
Spoke to patient's wife about pathology results. Explained that the issue was not tumor growth, but from his previous radiation treatment. Will relay results to Dr. Persaud as well.

## 2023-07-07 NOTE — TELEPHONE ENCOUNTER
Per call to  pharmacy [facility]-    Pharmacy has paid claim for quantity of 40 for 7 days on 7/6/2023.  Pharmacy is unable to process through insurance for the quantity of 40 per 5 days at this time as patient has medication already and is not due to refill until 7/10.     PA has been approved for up to 8 tablets per day, 240 per 30 days.     Pharmacy will contact me if they have further issues for the next fill.

## 2023-07-07 NOTE — TELEPHONE ENCOUNTER
Prior Authorization Approval    Authorization Effective Date: 4/8/2023  Authorization Expiration Date: 1/7/2024  Medication: PA for Oxycodone  Approved Dose/Quantity: 240 tablets per 30 days (up to 8 per day)  Reference #:     Insurance Company: [employer] - Phone [phone] Fax [phone]  Expected CoPay:       CoPay Card Available:      Foundation Assistance Needed:    Which Pharmacy is filling the prescription (Not needed for infusion/clinic administered): [facility] [place]  [place] - [address]  Pharmacy Notified: Yes  Patient Notified: Yes

## 2023-07-10 ENCOUNTER — HOSPITAL ENCOUNTER (OUTPATIENT)
Dept: CT IMAGING | Facility: CLINIC | Age: 43
Discharge: HOME | End: 2023-07-10
Attending: STUDENT IN AN ORGANIZED HEALTH CARE EDUCATION/TRAINING PROGRAM | Admitting: STUDENT IN AN ORGANIZED HEALTH CARE EDUCATION/TRAINING PROGRAM
Payer: COMMERCIAL

## 2023-07-10 DIAGNOSIS — C34.31 MALIGNANT NEOPLASM OF LOWER LOBE OF RIGHT LUNG (H): ICD-10-CM

## 2023-07-10 PROCEDURE — 74177 CT ABD & PELVIS W/CONTRAST: CPT

## 2023-07-10 PROCEDURE — 250N000011 HC RX IP 250 OP 636: Performed by: STUDENT IN AN ORGANIZED HEALTH CARE EDUCATION/TRAINING PROGRAM

## 2023-07-10 PROCEDURE — 250N000009 HC RX 250: Performed by: STUDENT IN AN ORGANIZED HEALTH CARE EDUCATION/TRAINING PROGRAM

## 2023-07-10 RX ORDER — IOPAMIDOL 755 MG/ML
500 INJECTION, SOLUTION INTRAVASCULAR ONCE
Status: COMPLETED | OUTPATIENT
Start: 2023-07-10 | End: 2023-07-10

## 2023-07-10 RX ADMIN — IOPAMIDOL 98 ML: 755 INJECTION, SOLUTION INTRAVENOUS at 14:49

## 2023-07-10 RX ADMIN — SODIUM CHLORIDE 65 ML: 9 INJECTION, SOLUTION INTRAVENOUS at 14:49

## 2023-07-11 NOTE — PROGRESS NOTES
MEDICAL ONCOLOGY FOLLOW UP NOTE    PATIENT NAME: Connor Emerson  ENCOUNTER DATE: 7/12/2023    Care Team  Primary Oncologist: Bassam Persaud MD    REASON FOR CURRENT VISIT: F/u of lung cancer    HISTORY OF PRESENT ILLNESS:  Mr. Connor Emerson is a 42 year old  male who is a non-smoker with PMHx of T2DM, HTN with metastatic NSCLC comes for follow up     Oncologic Hx:    Diagnosis:   Stage IV NSCLC, Rt lung adenocarcinoma with metastasis to pleura, mediastinum , rt pleural effusion and brain diagnosed 1/2022 (AJCC 8th edition)  PD-L1 TPS 2-3% by [place]   NGS [facility] panel-EML4:ALK rearragement  NGS Guard[facility]- GNAS R201H, KRAS K5E- No ALK    Treatment:    2/23/2022- current: Brigatinib. Dose reduced to 60 mg due to cough after two days of 90 mg daily -->back on 90 mg---> increased to 180 mg at last visit due to CNS progression     3/2/22- 12/2022 Alectinib 300 mg BID (Dose reduced to 450 mg BID from 600 mg BID due to grade 3 myalgias 3/21/22, again reduced to 300 mg BID 9/28/22)    )  Past:  2/15/22- GK to 11 brain lesions  6/28/22- Craniotomy, resection  9/28/22-10/26- Bevacizumab for radiation necrosis (stopped due to PE)      Intent of treatment: Palliative    Oncologic course:  1/19/22 to 1/22/22-Admitted to [facility] for 2 week progressive SOB secondary to have large rt sided pleural effusion, needing thoracentesis x2 (1.7L and 2.0 L removed), cytology positive for malignancy, adenocarcinoma.   1/26/22- Rt pleural mass biopsy-Dr. Agrawal--POSITIVE FOR ADENOCARCINOMA CONSISTENT WITH LUNG PRIMARY, admixed with mesothelial hyperplasia and inflammatory infiltrate (+ TTF-1 and CK 7;  negative  p40, calretinin and WT-1. PAX8 immunostain focal +). 4th thoracentesis done simultaneously - 3L approx removed.   2/1/22- PET/CT-Right lower lobe central infiltrative FDG avid 8.2 x 9.6 cm mass representing a primary lung adenocarcinoma. Ipsilateral right perihilar, bilateral pretracheal, subcarinal and superior mediastinal michele  metastases. Contralateral mildly FDG avid few lung nodules are suspicious for contralateral metastasis. At least 3 intracranial metastases in the right frontal lobe, left frontal lobe and left cerebellar hemisphere. Nonspecific mild diffuse bone marrow uptake. Further evaluation with a spine MRI could be considered to rule out early marrow infiltration. This could also be seen with red marrow conversion.  2/5/22-  Brain MRI- At least 9 intracranial metastases as detailed above. The dominant lesions involving the orbital right frontal lobe, the posterior left middle frontal gyrus, anterior right temporal lobe and in the left cerebellar hemisphere have surrounding moderate vasogenic type edema.  2/15/22- Saw Dr. Arango from Rad Onc- Rcd GK to 12 lesion in bran  2/16/22- Pleurex placement   3/2/22- Started Alectinib 600 mg BID  3/21/22- Dose reduced to 450 mg BID due to grade 3 myalgias and fatigue  4/2/22 to 4/5/22- Admitted at [facility] for- Severe sepsis due to MSSA infection of right PleurX catheter s/p removal- He presented with onset of pain at tube site starting 4/1; at arrival was tachycardic with leukocytosis (22.7) and elevated lactic acid (2.9).  CT chest showed fluid and stranding tracking outside the pleural space into chest wall along pleural catheter.  IR was consulted and removed catheter 4/2 with report of pustular drainage and tip culture growing MSSA.  Thoracic Surg was consulted who felt no surgical indication necessary given minimal pleural fluid and lack of any signs of abscess.  Initially treated with broad spectrum coverage for sepsis, narrowed to Ancef once sensitivities returned with plan to transition to cefadroxil for an additional 10 days at discharge per ID. Held drug 4/2 to 4/11 5/2/22- CT CAP- Overall, positive response to therapy with decreased size of right lower lobe and right pleural-based masses, pulmonary metastases, hilar and mediastinal lymphadenopathy. However, a single  right posterior pleural-based mass has slightly increased in size since 2/24/2022. No metastatic disease in the abdomen and pelvis. Right Pleurx catheter has been removed. Trace right pleural effusion and right basilar atelectasis.  5/2/22- Brain MRI- The previously demonstrated brain metastases are mildly diminished in size versus to 2/5/2022. The degree of edema is also diminished but not completely resolved. Probable trace amounts of intralesional bleeding demonstrated on the gradient sequence within the metastases. No definite new metastasis or progressive mass effect. No hydrocephalus or infarct.    6/15/22 to 6/17/22- Admitted at [facility]-with aphasia and word finding difficulty over last few weeks.  He presented to [facility] ED on 6/10 for evaluation of his symptoms. MRI brain showed multiple intracranial metastases, with interval enlargement of the dominant lesion within the left frontal lobe and increased surrounding vasogenic edema with 2 mm rightward shift of the septum pellucidum. Due to his worsening anxiety, he left AMA. His symptoms continued to progress to where he could not write at work so he decided to go to the ED for re-evaluation and treatment. Evaluated by NSGY, Rad Onc (radiaiton necrosis vs tumor progression).  6/16/22- MR Brain (6/16) shows multiple intracranial metastases, with interval enlargement  of the dominant lesion within the left frontal lobe and increased surrounding vasogenic edema with 2 mm rightward shift of the septum pellucidum.  6/16/22- - CT CAP shows slightly decreased size of right lower lobe and right pleural-based masses. No new pulmonary nodules or lymphadenopathy; No evidence of metastatic disease in the abdomen or pelvis.   6/28/22 to 6/30/22- Admitted at [facility]- Elective left Stealth craniotomy with resection of brain tumor due to ongoing symptoms. No intraoperative complications. EBL 50 ml.  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  7/5/22- Ct CAP- Right lower  lobe low-density nodules are not significantly changed. A small left upper lobe pulmonary nodule is also unchanged. Trace pleural fluid on the right has increased slightly. No convincing evidence for metastatic disease in the abdomen or pelvis.  7/18/22 to 7/19/22- Admitted to [facility] for seizure- Reportedly was only taking once Keppra instead of twice daily. Also resumed on dexamethasone 2 mg daily  8/1/22- Brain MRI- Redemonstrated postsurgical changes status post left frontoparietal Craniotomy. Interval increase in size of the dominant ring-enhancing lesion in the left posterior superior frontal lobe with increased moderate surrounding vasogenic edema and local mass effect resulting in narrowing of the supratentorial ventricular system. No significant midline shift/herniation at this time    8/1/22- Dex was increased to 4 mg daily by Dr. Moran    9/1/22- CT Chest- Near resolution of previously seen right pleural nodule. Stable right lower lobe pulmonary nodule    9/28/22- Bevacizumab for radiation necrosis    10/26/22- Bevacizumab     10/26/22- Ct CAP- Stable posterior medial right lower lobe 1.9 x 1.1 cm nodule series 8 image 176. Adjacent stable scarring and atelectasis. The previously noted pleural nodule posteriorly on the right is not currently clearly identified. Stable left upper lobe 0.3 cm nodule image 56    10/26/22- Brain MRI- Overall improved appearance of multiple intracranial metastases with near resolution of associated edema and diminished enhancement and size of multiple residual lesions. No definite new or progressive metastasis.    11/7/22 to 11/9/22- Admitted for PE and HTN urgency- Small pulmonary embolism in the right lower lobe pulmonary artery. started on Lovenox, Brain MRI neg for PRES.    12/29/22- ED visit- bilateral hip pain, pain in shoulders, knuckles, knees, and ankles- holding alectinib since 12/20/22 1/6/23- Ct CAP- Mild groundglass nodularity in the left upper lobe is new since  the previous exam, and may be infectious in etiology. No other significant interval change. Pulmonary nodules are not significantly changed.    1/6/23- Brain MRI- Stable to diminished sequelae of intracranial metastasis and treatment changes. No new or progressive metastasis. No superimposed acute intracranial finding.     2/23/2022- Start Brigatinib. Dose reduced to 60 mg due to cough after two days of 90 mg daily -  3/23/23-Brigatinib  (increase to 90 mg)    4/20/23- CT CAP- Stable- Previously noted mild groundglass nodularity in the left upper lobe has resolved.Pulmonary nodules are unchanged.Trace amount pleural fluid on the right has decreased slightly.    4/20/23- Brain MRI- Possile progression- Largest metastasis within the right frontal lobe has increased in size with worsening peripheral nodular enhancement and worsening vasogenic edema contributing to new right to left midline shift.  Multiple new/enlarging metastases scattered throughout the cerebral hemispheres and cerebellum. Started on dexamethasone by NGS on 4/28/23 5/17/23- presents to clinic with glucose 627, admission to hospital for stability on insulin drip    6/16/23- Brain MRI- Interval increase in size of the necrotic lesion in the anterior aspect of the right frontal lobe from 2.4 x 2.3 x 2.4 cm previously to 3.0 x 3.5 x 2.8 cm on the current study. Mass effect due to slightly increased vasogenic edema surrounding the right frontal lesion resulted in increase of leftward midline shift of the anterior interhemispheric fissure from 0.7 cm previously to 0.9 cm currently. Interval increase in size in two adjacent metastases at the frontoparietal junction on the left. The remaining scattered intra-axial enhancing nodules are not changed appreciably in size or appearance since the comparison study.No evidence for acute intracranial pathology.   Dr. Moran- Due to worsening headaches and more problems with walking. Recommended a right Stealth  craniotomy for resection of the lesion.     6/27/23- Right stealth craniotomy and resection of tumor: Final path: radiation necrosis    7/10/23- Ct CAP- stable Stable exam without evidence for new disease.Stable pulmonary nodules including a dominant nodular opacity at the right lower lobe.      He works as a maintenance manger for apartment complexes    Interval Hx:  He recently underwent craniotomy and resection of brain lee which came back as radiation necrosis.conitnues to have   some more forgetfulness, dull achey headache compared to severe HAs prior to surgery  Equal strength to bilateral upper extremities  He has restarted the brigatinib, on 6/28/23.  He thinks his body has slowed down and is stiff, feels has fatigue. But once he is in motion, he does not feel like starting  DOing brigatinib 180 mg,  The MSK pain is better, still using methadone and oxycodone  But he is able to manage ok, independent of ADL and IADL  Last dose of steroid was today  Sugars at home, low 200's  Taking insulin: 70 units+corrected  Also takes Anti-HTN meds, today BP is high, claims taking all anti HTN meds,    ECOG PS 0-1    REVIEW OF SYSTEMS: 14 point ROS negative other than the symptoms noted above in the HPI.    Wt Readings from Last 4 Encounters:   06/28/23 91.3 kg (201 lb 4.5 oz)   06/23/23 99 kg (218 lb 3.2 oz)   06/19/23 98 kg (216 lb)   06/06/23 93 kg (205 lb)      Review of Systems:  A comprehensive ROS was performed and found to be negative or non-contributory with the exception of that noted in the HPI above.    Past Medical History:  GERD  Hypertension, not on medication  Type 2 diabetes mellitus, not on medications currently, previously on Metformin    Past Surgical History:  Past Surgical History:   Procedure Laterality Date     BRONCHOSCOPY RIGID OR FLEXIBLE W/TRANSENDOSCOPIC ENDOBRONCHIAL ULTRASOUND GUIDED Bilateral 1/26/2022    Procedure: Right BRONCHOSCOPY, FIBEROPTIC, endobronchial ultrasound, pleural biopsy;   Surgeon: Dallin Agrawal MD;  Location: [facility] OR     INJECT BLOCK MEDIAL BRANCH CERVICAL/THORACIC/LUMBAR       INSERT CHEST TUBE Right 2022    Procedure: INSERTION, CATHETER, INTERCOSTAL, FOR DRAINAGE;  Surgeon: Dallin Agrawal MD;  Location: [facility] GI     INSERT CHEST TUBE Right 3/9/2022    Procedure: INSERTION, CATHETER, INTERCOSTAL, FOR DRAINAGE;  Surgeon: Sushila Antonio MD;  Location: [facility] GI     IR CHEST TUBE REMOVAL TUNNELED RIGHT  2022     OPTICAL TRACKING SYSTEM CRANIOTOMY, EXCISE TUMOR, COMBINED Left 2022    Procedure: Left stealth craniotomy for tumor resection with motor mapping;  Surgeon: Stephen Moran MD;  Location:  OR     OPTICAL TRACKING SYSTEM CRANIOTOMY, EXCISE TUMOR, COMBINED Right 2023    Procedure: Right stealth craniotomy and resection of tumor;  Surgeon: Stephen Moran MD;  Location:  OR     ORTHOPEDIC SURGERY      Ganesh. Rotator cuff repair.     PLEUROSCOPY N/A 2022    Procedure: Pleuroscopy with Pleural Biopsy;  Surgeon: Dallin Agrawal MD;  Location:  OR       Social History:  Lives with wife and 4 kids in [place]. Works as a  for an apartment complex in [place]. Exposure to household chemicals and . No significant exposure to asbestos. No signal exposure to benzene or similar chemicals. No significant smoking history-states that he smoked 1 to 2 cigarettes occasionally per month for about 2 years in college, non-smoking since then. No significant alcohol use history. No other recreational substances. Good support system. Kids are 23, 19, 17 and 13.    Family History  Significant history for cancers on maternal side. Mother  of uterine cancer. 2 maternal uncles have possible metastatic melanoma.    Outpatient Medications:  Current Outpatient Medications   Medication     acetaminophen (TYLENOL) 325 MG tablet     albuterol (PROAIR HFA/PROVENTIL HFA/VENTOLIN HFA) 108 (90 Base) MCG/ACT inhaler     alcohol swab prep pads      Alcohol Swabs PADS     blood glucose (NO BRAND SPECIFIED) lancets standard     blood glucose (NO BRAND SPECIFIED) test strip     blood glucose monitoring (NO BRAND SPECIFIED) meter device kit     blood glucose monitoring (NO BRAND SPECIFIED) meter device kit     brigatinib (ALUNBRIG) 30 MG TABS tablet     citalopram (CELEXA) 20 MG tablet     Continuous Blood Gluc Sensor (FREESTYLE IRENE 2 SENSOR) MISC     dexamethasone (DECADRON) 1 MG tablet     hydrochlorothiazide (HYDRODIURIL) 25 MG tablet     insulin aspart (NOVOLOG PEN) 100 UNIT/ML pen     insulin glargine (LANTUS PEN) 100 UNIT/ML pen     insulin pen needle (31G X 8 MM) 31G X 8 MM miscellaneous     insulin pen needle (32G X 4 MM) 32G X 4 MM miscellaneous     lisinopril (ZESTRIL) 40 MG tablet     metFORMIN (GLUCOPHAGE XR) 500 MG 24 hr tablet     metFORMIN (GLUCOPHAGE XR) 500 MG 24 hr tablet     methadone (DOLOPHINE) 5 MG tablet     methocarbamol (ROBAXIN) 750 MG tablet     naloxone (NARCAN) 4 MG/0.1ML nasal spray     oxyCODONE IR (ROXICODONE) 10 MG tablet     propranolol (INDERAL) 20 MG tablet     senna-docusate (SENOKOT-S/PERICOLACE) 8.6-50 MG tablet     [employer] MISC     zolpidem (AMBIEN) 5 MG tablet     dexamethasone (DECADRON) 2 MG tablet     dexamethasone (DECADRON) 2 MG tablet     dexamethasone (DECADRON) 4 MG tablet     No current facility-administered medications for this visit.     PHYSICAL EXAMINATION ATTAINABLE DURING VIDEO VISIT:  CONSTITUTIONAL - Pt looks like stated age, pleasant, not in acute distress. Not obese.  NEURO: Oriented to time, person, and places. No tremor.  ENT, MOUTH: Pupils are equal.  Sclerae are anicteric.  Moist oral mucosa. No oral thrush.   NECK:  No jugular venous distention.  No thyroid enlargement.   RESPIRATORY: talk nl, no sob during conversation, no cough.   MUSCULOSKELETAL/EXTREMITIES:  No edema.  No joint deformity. Normal range of motion  SKIN:  No petechiae.  No rash.  No signs of cellulitis.   PSYCHIATRIC:  Normal mood and affect. Good memory. Proper insight and judgement.       Labs & Studies: I personally reviewed the following studies:  Most Recent 3 CBC's:  Recent Labs   Lab Test 06/28/23  0410 06/27/23  1331 06/16/23  0841   WBC 17.3* 10.7 6.7   HGB 14.7 15.9 14.8   MCV 90 89 91    294 185     Most Recent 3 BMP's:  Recent Labs   Lab Test 06/29/23  1233 06/29/23  0757 06/28/23  2236 06/28/23  0813 06/28/23  0410 06/27/23  1645 06/27/23  1331 06/16/23  0841 05/20/23  0602 05/20/23  0601   NA  --   --   --   --  139  --   --  142  --  142   POTASSIUM  --   --   --   --  3.7  --  3.5 3.8  --  3.9   CHLORIDE  --   --   --   --  103  --   --  104  --  105   CO2  --   --   --   --  25  --   --  27  --  29   BUN  --   --   --   --  15.5  --   --  11.4  --  14.0   CR  --   --   --   --  0.53*  --  0.67 0.59*  --  0.55*   ANIONGAP  --   --   --   --  11  --   --  11  --  8   TORY  --   --   --   --  9.0  --   --  9.1  --  9.1   * 111* 304*   < > 187*   < > 204* 331*   < > 93    < > = values in this interval not displayed.    Most Recent 2 LFT's:  Recent Labs   Lab Test 06/16/23  0841 05/20/23  0601   AST 17 15   ALT 20 20   ALKPHOS 103 60   BILITOTAL 0.6 0.5    Most Recent TSH and T4:  Recent Labs   Lab Test 09/12/22  1642   TSH 2.56        ASSESSMENT AND PLAN:  Stage IV NSCLC, Rt lung adenocarcinoma with metastasis to pleura, mediastinum , rt pleural effusion and brain diagnosed 1/2022 (AJCC 8th edition)  PD-L1 TPS 2-3% by [place]   NGS [facility] panel-EML4:ALK rearragement; chr2:[id number], chr2:[id number]  NGS [facility]- GNAS R201H, KRAS K5E- No ALK    He began Alectinib 600 mg BID 3/2/22 and unfortunately developed grade 3 myalgias which have improved with lowering the dose 450 mg BID. He was holding drug 4/2/22 to 4/11/22 due to MSSA infection from pleurex which is removed. Resumed at 450 mg BID. Due to ongoing myalgias (Although CK is normal), we dose reduced to 300 mg BID.     In Dec 2022, developed Gr 3  arthralgia, and we stopped drug 12/20/22. Had unremitting arthalgias, eventully had to starte PO steroids which led to improvement and resolved. Radiographicaly, he has had a near CR to Rx in the lung, has a residual rt LL lesion.     Began brigatinib 2/22/23 (delayed due to pt hesitancy). Developed severe cough on day 2 so brigatinib was held and cough resolved with in 24-48 hours. He restarted 60 mg daily and upped it to 90 mg.Restging CT showing stable disease in the lung, however, MRI with several contrast enhancement and increase in size of previously treated lesions. His dose was increased to 180 mg daily to address possible CNS disease progression and started on dexamethasone. Unfortunately missed tox follow up then called in feeling very unwell. He was found to have severe hyperglycemia and was admitted. 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 CT in [place] 201[address] showing stbale disease. He is back on 180 mg/day of brigatiib but continues to have MSK pain, stiffness and fatigue. Overall  less severe myalgias/arthralgias then with alectinib. We discussed trying to manage with pain medication (Working with palliative) but ultimately we need to reduce the dose. He has agreed to reduce it  to 120 mg (4 tabs).       Plan  Bevacizumab infusion appts (yet to order it)   COntinue labs monthly   RTC with NP/PA in 2-3 weeks   CT in 2 months   RTC with me after CT    # Brain mets:  # Radiation necrosis  Baseline Brain MRI with several brain mets, s/p GK to 11-12 brain mets. F/u Brain MRI in June 2022 was showing enlargement of the one of the lesions along with edema, therefore had to undergo craniotomy followed by resection, the final biopsy consistent with radiation necrosis.  Had issues with radiation necrosis and swelling requiring steroids but these were driving up blood sugars drastically. Was on bevacizumab for radiation necrosis --15 mg/kg every 3 weeks.   S/p 2 doses of Bevacixumab,hold due HTN urgency and PE. MRI in 4/2023 now showing contrast enhancement of lesions and a dominat lesion int he rt frontal regionw ith edema, several other smaller lesion. Short term MRI showing increase in size of the rt frontal lesion, underwent resection, patho again showing radiation necrosis.   He is off of dexamethasone, discussed that long term dex is not an ideal option and discussed risks and benefits of reintroducing bevacizumab.   Although I am worried about HTN and  bleeding it could be worthwhile considering this. He was agreeable.    Plan  Start bevacizumab 15mg/kg every 3 weeks            # Grade 3 arthralgias  related to previous alectinib, unremiittign with tylenol, NSAIDs or xocyodoen,- nearly resolved with steroids -All joints affected, no morning stiffness, normal ESRa nd CRP  Now has recrudenscen of this while on Brigatinib but not as severe.  Recently has noticed more fatigue and stifnnes in body  -following with palliative;   -on methadone 2.5 mg in AM and 5 mg at HS; oxycodone 5-10 mg po q 4 hours prn; Celecoxib 100 mg po BID           # PE: provoked by malignancy vs bevacizumab in 11/2022  -New right-sided pleuritic chest pain, tachycardiam, CT showing rt sided sub segmental PE wo   -- on rivaroxiabn 20 mg daily  -     # G3 diastolic HTN - led to one dose hold of bevacizumab and now discontinued  Todays /115. Asked him to go to ER, but pt attributed this toto anxietya nd pain. Adviesed pt to got o ER if he has any enurologic symptoms, rxplained risk of stroke or other CV problems.   Pt agreed to monitor BP at home.  -WIll ioncrease hydrochlorothiazide to 50 mg and conitnue lisinoprila nd propranol     # Type 2 Diabetes:   #hyperglycemia: Acute on chronic--prompting last admission. Reports glucose has been stable. Following with endo.  Admission 2 times ofr uncontrolled blood sugar ISelf monitoring of blood glucose is not at goal of fasting  mg/dL.  Last BG . On insulin and metformin 500mg ER every day , hopefully now he is off dex, it should be better.   --Started using Mohan 2 continous glucose monitor  --FOllows medication management-   --on  Metformin and insulin         #normocytic anemia: sudden drop to <7, requiring 1 unit blood transfusion. Lab work up unrevealing. Has recovered to baseline  -consider EGD if hgb drops again, risk for GI bleed with chronic steroid use           #right chest/rib pain---> intermittent- improved  Reproducible and intermittent, worse with movement of torso. Unsure of etiology but we agreed to monitor given mild, possibly msk, and no correlating respiratory or cardiac symptoms.          #MSSA infection, Sepsis at pleurex site- resolved  # Pleural effusion: s/p pleurex palcement  2/16/22. Then on 4/2 right PleurX catheter s/p removal for severe sepsis due to MSSA infection.      #sharp right chest pain:  I suspect this is residual nerve damage from his prior pleurex given its onset and longevity. Hopefully methadone will be helpful for this if it is in fact neuropathic pain.        On the day of service  Chart review: 5 minutes  Visit duration: 45 minutes  Care coordination: 5 minutes    Bassam Persaud MD    Hematology, Oncology and Transplantation

## 2023-07-12 ENCOUNTER — ALLIED HEALTH/NURSE VISIT (OUTPATIENT)
Dept: NEUROSURGERY | Facility: CLINIC | Age: 43
End: 2023-07-12
Attending: NEUROLOGICAL SURGERY
Payer: COMMERCIAL

## 2023-07-12 ENCOUNTER — VIRTUAL VISIT (OUTPATIENT)
Dept: ONCOLOGY | Facility: CLINIC | Age: 43
End: 2023-07-12
Attending: STUDENT IN AN ORGANIZED HEALTH CARE EDUCATION/TRAINING PROGRAM
Payer: COMMERCIAL

## 2023-07-12 VITALS
HEIGHT: 69 IN | BODY MASS INDEX: 31.1 KG/M2 | SYSTOLIC BLOOD PRESSURE: 140 MMHG | WEIGHT: 210 LBS | DIASTOLIC BLOOD PRESSURE: 110 MMHG

## 2023-07-12 VITALS
HEIGHT: 69 IN | TEMPERATURE: 98.1 F | WEIGHT: 201 LBS | SYSTOLIC BLOOD PRESSURE: 142 MMHG | DIASTOLIC BLOOD PRESSURE: 106 MMHG | OXYGEN SATURATION: 98 % | BODY MASS INDEX: 29.77 KG/M2 | HEART RATE: 59 BPM

## 2023-07-12 DIAGNOSIS — C34.31 MALIGNANT NEOPLASM OF LOWER LOBE OF RIGHT LUNG (H): Primary | ICD-10-CM

## 2023-07-12 DIAGNOSIS — D49.6 BRAIN TUMOR (H): ICD-10-CM

## 2023-07-12 DIAGNOSIS — Z98.890 STATUS POST CRANIOTOMY: Primary | ICD-10-CM

## 2023-07-12 DIAGNOSIS — Z98.890 S/P CRANIOTOMY: Primary | ICD-10-CM
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
## 2023-07-12 DIAGNOSIS — I67.89 RADIATION THERAPY INDUCED BRAIN NECROSIS: ICD-10-CM

## 2023-07-12 PROCEDURE — 99207 PR NO CHARGE NURSE ONLY: CPT

## 2023-07-12 PROCEDURE — 99215 OFFICE O/P EST HI 40 MIN: CPT | Mod: 95 | Performed by: STUDENT IN AN ORGANIZED HEALTH CARE EDUCATION/TRAINING PROGRAM

## 2023-07-12 ASSESSMENT — PAIN SCALES - GENERAL
PAINLEVEL: NO PAIN (0)
PAINLEVEL: MODERATE PAIN (5)

## 2023-07-12 NOTE — PATIENT INSTRUCTIONS
Instructions for Patient    Incision  Keep your incision clean and dry at all times.   You may get your incision wet in the shower. Use a gentle shampoo with no fragrance, such as baby shampoo, until incision is completely healed. Allow soap and water to run over the incision and gently pat it dry.   Look at your incision site every day. You  may need a mirror or family member to help you.   Watch for signs of infection  Redness, swelling, warmth, drainage (Green or yellow drainage (pus) from your incision or increased bloody drainage), and fever of 101 degrees or higher  Notify clinic [phone]  No submerging incision in water such as pools, hot tubs, or baths for at least 8 weeks and until the incision is healed  Do not apply lotions or ointments to incision  Avoid coloring your hair or permanent styling until cleared by your surgeon    Activity  Post operative activity limitations for 4 weeks after surgery: No bending forward, no lifting anything greater than 10 pounds (a gallon of milk weighs approximately 8 pounds)  Ok to start driving if it has been 2 weeks since surgery and you have not had seizure activity and you are not taking narcotics.  If you have had a seizure, you may not drive for at least 3 months according to [place] law.  No strenuous activity  We encourage short frequent walks. You may gradually increase the distance as tolerated.    Medications   Refills of pain medication:   Please call the neurosurgery clinic to request 2-3 days before you run out  Weaning from narcotic pain medications  When it is time, start weaning by extending the time between doses.   For example, if you're taking 2 tabs every 4 hours, spread it out to 2 tabs over 4.5, 5, 6 hours. At that point you can certainly cut down to 1 tab, then wean to an as needed basis until completely done with them.Refills: call our clinic 2-3 business days before you are out of medication. A nurse will call you back to obtain a pain  assessment.   Don't take more than 3000mg of Acetaminophen in 24 hours  Ok to begin taking Aspirin and NSAIDs (ex: ibuprofen/Advil)  Encouraged icing for at least 3-4 times throughout the day for 20-30 minutes at a time. Avoid heat to the incision area.   Taking stool softeners regularly can reduce constipation commonly caused by narcotic pain medications.    Call the Clinic or go to the Emergency Room  Development of new pain/symptoms or worsening of symptoms  Incision infection (incisional redness, swelling, warmth, drainage, or fever)    Go to the Emergency Room   If sudden onset of Acute changes in the level of consciousness (increased confusion, memory loss, speech abnormalities), Any change in hearing or vision , increased headaches, or New onset of seizures  Chest pain   Trouble breathing     Post-Op Follow Up Appointments  2 week post- op for staple removal with a nurse. If you live far away, you may see your primary care doctor for a wound check at 2 weeks.   4-6 weeks post-op with nurse practitioner or physician assistant   3 month post-op with nurse practitioner or physician assistant       [facility]  [facility] - [address] [address]  Telephone:  [phone]       Fax:  [phone]

## 2023-07-12 NOTE — LETTER
7/12/2023         RE: Connor Emerson  [address]  [facility] [address]        Dear Colleague,    Thank you for referring your patient, Connor Emerson, to the [facility]. Please see a copy of my visit note below.    MEDICAL ONCOLOGY FOLLOW UP NOTE    PATIENT NAME: Connor Emerson  ENCOUNTER DATE: 7/12/2023    Care Team  Primary Oncologist: Bassam Persaud MD    REASON FOR CURRENT VISIT: F/u of lung cancer    HISTORY OF PRESENT ILLNESS:  Mr. Connor Emerson is a 42 year old  male who is a non-smoker with PMHx of T2DM, HTN with metastatic NSCLC comes for follow up     Oncologic Hx:    Diagnosis:   Stage IV NSCLC, Rt lung adenocarcinoma with metastasis to pleura, mediastinum , rt pleural effusion and brain diagnosed 1/2022 (AJCC 8th edition)  PD-L1 TPS 2-3% by [facility]   NGS [facility] panel-EML4:ALK rearragement  NGS Guardant- GNAS R201H, KRAS K5E- No ALK    Treatment:    2/23/2022- current: Brigatinib. Dose reduced to 60 mg due to cough after two days of 90 mg daily -->back on 90 mg---> increased to 180 mg at last visit due to CNS progression     3/2/22- 12/2022 Alectinib 300 mg BID (Dose reduced to 450 mg BID from 600 mg BID due to grade 3 myalgias 3/21/22, again reduced to 300 mg BID 9/28/22)    )  Past:  2/15/22- GK to 11 brain lesions  6/28/22- Craniotomy, resection  9/28/22-10/26- Bevacizumab for radiation necrosis (stopped due to PE)      Intent of treatment: Palliative    Oncologic course:  1/19/22 to 1/22/22-Admitted to [facility] for 2 week progressive SOB secondary to have large rt sided pleural effusion, needing thoracentesis x2 (1.7L and 2.0 L removed), cytology positive for malignancy, adenocarcinoma.   1/26/22- Rt pleural mass biopsy-Dr. Agrawal--POSITIVE FOR ADENOCARCINOMA CONSISTENT WITH LUNG PRIMARY, admixed with mesothelial hyperplasia and inflammatory infiltrate (+ TTF-1 and CK 7;  negative  p40, calretinin and WT-1. PAX8 immunostain focal +). 4th thoracentesis done  simultaneously - 3L approx removed.   2/1/22- PET/CT-Right lower lobe central infiltrative FDG avid 8.2 x 9.6 cm mass representing a primary lung adenocarcinoma. Ipsilateral right perihilar, bilateral pretracheal, subcarinal and superior mediastinal michele metastases. Contralateral mildly FDG avid few lung nodules are suspicious for contralateral metastasis. At least 3 intracranial metastases in the right frontal lobe, left frontal lobe and left cerebellar hemisphere. Nonspecific mild diffuse bone marrow uptake. Further evaluation with a spine MRI could be considered to rule out early marrow infiltration. This could also be seen with red marrow conversion.  2/5/22-  Brain MRI- At least 9 intracranial metastases as detailed above. The dominant lesions involving the orbital right frontal lobe, the posterior left middle frontal gyrus, anterior right temporal lobe and in the left cerebellar hemisphere have surrounding moderate vasogenic type edema.  2/15/22- Saw Dr. Arango from [facility] Onc- Rcd GK to 12 lesion in bran  2/16/22- Pleurex placement   3/2/22- Started Alectinib 600 mg BID  3/21/22- Dose reduced to 450 mg BID due to grade 3 myalgias and fatigue  4/2/22 to 4/5/22- Admitted at [facility] for- Severe sepsis due to MSSA infection of right PleurX catheter s/p removal- He presented with onset of pain at tube site starting 4/1; at arrival was tachycardic with leukocytosis (22.7) and elevated lactic acid (2.9).  CT chest showed fluid and stranding tracking outside the pleural space into chest wall along pleural catheter.  IR was consulted and removed catheter 4/2 with report of pustular drainage and tip culture growing MSSA.  Thoracic Surg was consulted who felt no surgical indication necessary given minimal pleural fluid and lack of any signs of abscess.  Initially treated with broad spectrum coverage for sepsis, narrowed to Ancef once sensitivities returned with plan to transition to cefadroxil for an additional 10  days at discharge per ID. Held drug 4/2 to 4/11 5/2/22- CT CAP- Overall, positive response to therapy with decreased size of right lower lobe and right pleural-based masses, pulmonary metastases, hilar and mediastinal lymphadenopathy. However, a single right posterior pleural-based mass has slightly increased in size since 2/24/2022. No metastatic disease in the abdomen and pelvis. Right Pleurx catheter has been removed. Trace right pleural effusion and right basilar atelectasis.  5/2/22- Brain MRI- The previously demonstrated brain metastases are mildly diminished in size versus to 2/5/2022. The degree of edema is also diminished but not completely resolved. Probable trace amounts of intralesional bleeding demonstrated on the gradient sequence within the metastases. No definite new metastasis or progressive mass effect. No hydrocephalus or infarct.    6/15/22 to 6/17/22- Admitted at [facility]-with aphasia and word finding difficulty over last few weeks.  He presented to [facility] ED on 6/10 for evaluation of his symptoms. MRI brain showed multiple intracranial metastases, with interval enlargement of the dominant lesion within the left frontal lobe and increased surrounding vasogenic edema with 2 mm rightward shift of the septum pellucidum. Due to his worsening anxiety, he left AMA. His symptoms continued to progress to where he could not write at work so he decided to go to the ED for re-evaluation and treatment. Evaluated by NSGY, Rad Onc (radiaiton necrosis vs tumor progression).  6/16/22- MR Brain (6/16) shows multiple intracranial metastases, with interval enlargement  of the dominant lesion within the left frontal lobe and increased surrounding vasogenic edema with 2 mm rightward shift of the septum pellucidum.  6/16/22- - CT CAP shows slightly decreased size of right lower lobe and right pleural-based masses. No new pulmonary nodules or lymphadenopathy; No evidence of metastatic disease in the abdomen or pelvis.    6/28/22 to 6/30/22- Admitted at [facility]- Elective left Stealth craniotomy with resection of brain tumor due to ongoing symptoms. No intraoperative complications. EBL 50 ml.  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  7/5/22- Ct CAP- Right lower lobe low-density nodules are not significantly changed. A small left upper lobe pulmonary nodule is also unchanged. Trace pleural fluid on the right has increased slightly. No convincing evidence for metastatic disease in the abdomen or pelvis.  7/18/22 to 7/19/22- Admitted to [facility] for seizure- Reportedly was only taking once Keppra instead of twice daily. Also resumed on dexamethasone 2 mg daily  8/1/22- Brain MRI- Redemonstrated postsurgical changes status post left frontoparietal Craniotomy. Interval increase in size of the dominant ring-enhancing lesion in the left posterior superior frontal lobe with increased moderate surrounding vasogenic edema and local mass effect resulting in narrowing of the supratentorial ventricular system. No significant midline shift/herniation at this time    8/1/22- Dex was increased to 4 mg daily by Dr. Moran    9/1/22- CT Chest- Near resolution of previously seen right pleural nodule. Stable right lower lobe pulmonary nodule    9/28/22- Bevacizumab for radiation necrosis    10/26/22- Bevacizumab     10/26/22- Ct CAP- Stable posterior medial right lower lobe 1.9 x 1.1 cm nodule series 8 image 176. Adjacent stable scarring and atelectasis. The previously noted pleural nodule posteriorly on the right is not currently clearly identified. Stable left upper lobe 0.3 cm nodule image 56    10/26/22- Brain MRI- Overall improved appearance of multiple intracranial metastases with near resolution of associated edema and diminished enhancement and size of multiple residual lesions. No definite new or progressive metastasis.    11/7/22 to 11/9/22- Admitted for PE and HTN urgency- Small pulmonary embolism in the right lower lobe pulmonary  artery. started on Lovenox, Brain MRI neg for PRES.    12/29/22- ED visit- bilateral hip pain, pain in shoulders, knuckles, knees, and ankles- holding alectinib since 12/20/22 1/6/23- Ct CAP- Mild groundglass nodularity in the left upper lobe is new since the previous exam, and may be infectious in etiology. No other significant interval change. Pulmonary nodules are not significantly changed.    1/6/23- Brain MRI- Stable to diminished sequelae of intracranial metastasis and treatment changes. No new or progressive metastasis. No superimposed acute intracranial finding.     2/23/2022- Start Brigatinib. Dose reduced to 60 mg due to cough after two days of 90 mg daily -  3/23/23-Brigatinib  (increase to 90 mg)    4/20/23- CT CAP- Stable- Previously noted mild groundglass nodularity in the left upper lobe has resolved.Pulmonary nodules are unchanged.Trace amount pleural fluid on the right has decreased slightly.    4/20/23- Brain MRI- Possile progression- Largest metastasis within the right frontal lobe has increased in size with worsening peripheral nodular enhancement and worsening vasogenic edema contributing to new right to left midline shift.  Multiple new/enlarging metastases scattered throughout the cerebral hemispheres and cerebellum. Started on dexamethasone by NGS on 4/28/23 5/17/23- presents to clinic with glucose 627, admission to hospital for stability on insulin drip    6/16/23- Brain MRI- Interval increase in size of the necrotic lesion in the anterior aspect of the right frontal lobe from 2.4 x 2.3 x 2.4 cm previously to 3.0 x 3.5 x 2.8 cm on the current study. Mass effect due to slightly increased vasogenic edema surrounding the right frontal lesion resulted in increase of leftward midline shift of the anterior interhemispheric fissure from 0.7 cm previously to 0.9 cm currently. Interval increase in size in two adjacent metastases at the frontoparietal junction on the left. The remaining  scattered intra-axial enhancing nodules are not changed appreciably in size or appearance since the comparison study.No evidence for acute intracranial pathology.   Dr. Moran- Due to worsening headaches and more problems with walking. Recommended a right Stealth craniotomy for resection of the lesion.     6/27/23- Right stealth craniotomy and resection of tumor: Final path: radiation necrosis    7/10/23- Ct CAP- stable Stable exam without evidence for new disease.Stable pulmonary nodules including a dominant nodular opacity at the right lower lobe.      He works as a maintenance manger for apartment complexes    Interval Hx:  He recently underwent craniotomy and resection of brain lee which came back as radiation necrosis.conitnues to have   some more forgetfulness, dull achey headache compared to severe HAs prior to surgery  Equal strength to bilateral upper extremities  He has restarted the brigatinib, on 6/28/23.  He thinks his body has slowed down and is stiff, feels has fatigue. But once he is in motion, he does not feel like starting  DOing brigatinib 180 mg,  The MSK pain is better, still using methadone and oxycodone  But he is able to manage ok, independent of ADL and IADL  Last dose of steroid was today  Sugars at home, low 200's  Taking insulin: 70 units+corrected  Also takes Anti-HTN meds, today BP is high, claims taking all anti HTN meds,    ECOG PS 0-1    REVIEW OF SYSTEMS: 14 point ROS negative other than the symptoms noted above in the HPI.    Wt Readings from Last 4 Encounters:   06/28/23 91.3 kg (201 lb 4.5 oz)   06/23/23 99 kg (218 lb 3.2 oz)   06/19/23 98 kg (216 lb)   06/06/23 93 kg (205 lb)      Review of Systems:  A comprehensive ROS was performed and found to be negative or non-contributory with the exception of that noted in the HPI above.    Past Medical History:  GERD  Hypertension, not on medication  Type 2 diabetes mellitus, not on medications currently, previously on Metformin    Past  Surgical History:  Past Surgical History:   Procedure Laterality Date    BRONCHOSCOPY RIGID OR FLEXIBLE W/TRANSENDOSCOPIC ENDOBRONCHIAL ULTRASOUND GUIDED Bilateral 2022    Procedure: Right BRONCHOSCOPY, FIBEROPTIC, endobronchial ultrasound, pleural biopsy;  Surgeon: Dallin Agrawal MD;  Location:  OR    INJECT BLOCK MEDIAL BRANCH CERVICAL/THORACIC/LUMBAR      INSERT CHEST TUBE Right 2022    Procedure: INSERTION, CATHETER, INTERCOSTAL, FOR DRAINAGE;  Surgeon: Dallin Agrawal MD;  Location: [facility]    INSERT CHEST TUBE Right 3/9/2022    Procedure: INSERTION, CATHETER, INTERCOSTAL, FOR DRAINAGE;  Surgeon: Sushila Antonio MD;  [facility]: [facility]    IR CHEST TUBE REMOVAL TUNNELED RIGHT  2022    OPTICAL TRACKING SYSTEM CRANIOTOMY, EXCISE TUMOR, COMBINED Left 2022    Procedure: Left stealth craniotomy for tumor resection with motor mapping;  Surgeon: Stephen Moran MD;  Location:  OR    OPTICAL TRACKING SYSTEM CRANIOTOMY, EXCISE TUMOR, COMBINED Right 2023    Procedure: Right stealth craniotomy and resection of tumor;  Surgeon: Stephen Moran MD;  Location:  OR    ORTHOPEDIC SURGERY      Ganesh. Rotator cuff repair.    PLEUROSCOPY N/A 2022    Procedure: Pleuroscopy with Pleural Biopsy;  Surgeon: Dallin Agrawal MD;  Location:  OR       Social History:  Lives with wife and 4 kids in [place]. Works as a  for an apartment complex in [place]. Exposure to household chemicals and . No significant exposure to asbestos. No signal exposure to benzene or similar chemicals. No significant smoking history-states that he smoked 1 to 2 cigarettes occasionally per month for about 2 years in college, non-smoking since then. No significant alcohol use history. No other recreational substances. Good support system. Kids are 23, 19, 17 and 13.    Family History  Significant history for cancers on maternal side. Mother  of uterine cancer. 2 maternal uncles have  possible metastatic melanoma.    Outpatient Medications:  Current Outpatient Medications   Medication    acetaminophen (TYLENOL) 325 MG tablet    albuterol (PROAIR HFA/PROVENTIL HFA/VENTOLIN HFA) 108 (90 Base) MCG/ACT inhaler    alcohol swab prep pads    Alcohol Swabs PADS    blood glucose (NO BRAND SPECIFIED) lancets standard    blood glucose (NO BRAND SPECIFIED) test strip    blood glucose monitoring (NO BRAND SPECIFIED) meter device kit    blood glucose monitoring (NO BRAND SPECIFIED) meter device kit    brigatinib (ALUNBRIG) 30 MG TABS tablet    citalopram (CELEXA) 20 MG tablet    Continuous Blood Gluc Sensor (FREESTYLE IRENE 2 SENSOR) MISC    dexamethasone (DECADRON) 1 MG tablet    hydrochlorothiazide (HYDRODIURIL) 25 MG tablet    insulin aspart (NOVOLOG PEN) 100 UNIT/ML pen    insulin glargine (LANTUS PEN) 100 UNIT/ML pen    insulin pen needle (31G X 8 MM) 31G X 8 MM miscellaneous    insulin pen needle (32G X 4 MM) 32G X 4 MM miscellaneous    lisinopril (ZESTRIL) 40 MG tablet    metFORMIN (GLUCOPHAGE XR) 500 MG 24 hr tablet    metFORMIN (GLUCOPHAGE XR) 500 MG 24 hr tablet    methadone (DOLOPHINE) 5 MG tablet    methocarbamol (ROBAXIN) 750 MG tablet    naloxone (NARCAN) 4 MG/0.1ML nasal spray    oxyCODONE IR (ROXICODONE) 10 MG tablet    propranolol (INDERAL) 20 MG tablet    senna-docusate (SENOKOT-S/PERICOLACE) 8.6-50 MG tablet    Shar[employer] Container MISC    zolpidem (AMBIEN) 5 MG tablet    dexamethasone (DECADRON) 2 MG tablet    dexamethasone (DECADRON) 2 MG tablet    dexamethasone (DECADRON) 4 MG tablet     No current facility-administered medications for this visit.     PHYSICAL EXAMINATION ATTAINABLE DURING VIDEO VISIT:  CONSTITUTIONAL - Pt looks like stated age, pleasant, not in acute distress. Not obese.  NEURO: Oriented to time, person, and places. No tremor.  ENT, MOUTH: Pupils are equal.  Sclerae are anicteric.  Moist oral mucosa. No oral thrush.   NECK:  No jugular venous distention.  No thyroid  enlargement.   RESPIRATORY: talk nl, no sob during conversation, no cough.   MUSCULOSKELETAL/EXTREMITIES:  No edema.  No joint deformity. Normal range of motion  SKIN:  No petechiae.  No rash.  No signs of cellulitis.   PSYCHIATRIC: Normal mood and affect. Good memory. Proper insight and judgement.       Labs & Studies: I personally reviewed the following studies:  Most Recent 3 CBC's:  Recent Labs   Lab Test 06/28/23  0410 06/27/23  1331 06/16/23  0841   WBC 17.3* 10.7 6.7   HGB 14.7 15.9 14.8   MCV 90 89 91    294 185     Most Recent 3 BMP's:  Recent Labs   Lab Test 06/29/23  1233 06/29/23  0757 06/28/23  2236 06/28/23  0813 06/28/23  0410 06/27/23  1645 06/27/23  1331 06/16/23  0841 05/20/23  0602 05/20/23  0601   NA  --   --   --   --  139  --   --  142  --  142   POTASSIUM  --   --   --   --  3.7  --  3.5 3.8  --  3.9   CHLORIDE  --   --   --   --  103  --   --  104  --  105   CO2  --   --   --   --  25  --   --  27  --  29   BUN  --   --   --   --  15.5  --   --  11.4  --  14.0   CR  --   --   --   --  0.53*  --  0.67 0.59*  --  0.55*   ANIONGAP  --   --   --   --  11  --   --  11  --  8   TORY  --   --   --   --  9.0  --   --  9.1  --  9.1   * 111* 304*   < > 187*   < > 204* 331*   < > 93    < > = values in this interval not displayed.    Most Recent 2 LFT's:  Recent Labs   Lab Test 06/16/23  0841 05/20/23  0601   AST 17 15   ALT 20 20   ALKPHOS 103 60   BILITOTAL 0.6 0.5    Most Recent TSH and T4:  Recent Labs   Lab Test 09/12/22  1642   TSH 2.56        ASSESSMENT AND PLAN:  Stage IV NSCLC, Rt lung adenocarcinoma with metastasis to pleura, mediastinum , rt pleural effusion and brain diagnosed 1/2022 (AJCC 8th edition)  PD-L1 TPS 2-3% by [place]   NGS [facility] panel-EML4:ALK rearragement; chr2:[id number], chr2:[id number]  NGS Guardant- GNAS R201H, KRAS K5E- No ALK    He began Alectinib 600 mg BID 3/2/22 and unfortunately developed grade 3 myalgias which have improved with lowering the dose 450 mg  BID. He was holding drug 4/2/22 to 4/11/22 due to MSSA infection from pleurex which is removed. Resumed at 450 mg BID. Due to ongoing myalgias (Although CK is normal), we dose reduced to 300 mg BID.     In Dec 2022, developed Gr 3 arthralgia, and we stopped drug 12/20/22. Had unremitting arthalgias, eventully had to starte PO steroids which led to improvement and resolved. Radiographicaly, he has had a near CR to Rx in the lung, has a residual rt LL lesion.     Began brigatinib 2/22/23 (delayed due to pt hesitancy). Developed severe cough on day 2 so brigatinib was held and cough resolved with in 24-48 hours. He restarted 60 mg daily and upped it to 90 mg.Restging CT showing stable disease in the lung, however, MRI with several contrast enhancement and increase in size of previously treated lesions. His dose was increased to 180 mg daily to address possible CNS disease progression and started on dexamethasone. Unfortunately missed tox follow up then called in feeling very unwell. He was found to have severe hyperglycemia and was admitted. 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 CT in [place] 20123 showing stbale disease. He is back on 180 mg/day of brigatiib but continues to have MSK pain, stiffness and fatigue. Overall  less severe myalgias/arthralgias then with alectinib. We discussed trying to manage with pain medication (Working with palliative) but ultimately we need to reduce the dose. He has agreed to reduce it  to 120 mg (4 tabs).       Plan  Bevacizumab infusion appts (yet to order it)   COntinue labs monthly   RTC with NP/PA in 2-3 weeks   CT in 2 months   RTC with me after CT    # Brain mets:  # Radiation necrosis  Baseline Brain MRI with several brain mets, s/p GK to 11-12 brain mets. F/u Brain MRI in June 2022 was showing enlargement of the one of the lesions along with edema, therefore had to undergo craniotomy followed by resection, the final  biopsy consistent with radiation necrosis.  Had issues with radiation necrosis and swelling requiring steroids but these were driving up blood sugars drastically. Was on bevacizumab for radiation necrosis --15 mg/kg every 3 weeks.  S/p 2 doses of Bevacixumab,hold due HTN urgency and PE. MRI in 4/2023 now showing contrast enhancement of lesions and a dominat lesion int he rt frontal regionw ith edema, several other smaller lesion. Short term MRI showing increase in size of the rt frontal lesion, underwent resection, patho again showing radiation necrosis.   He is off of dexamethasone, discussed that long term dex is not an ideal option and discussed risks and benefits of reintroducing bevacizumab.   Although I am worried about HTN and  bleeding it could be worthwhile considering this. He was agreeable.    Plan  Start bevacizumab 15mg/kg every 3 weeks            # Grade 3 arthralgias  related to previous alectinib, unremiittign with tylenol, NSAIDs or xocyodoen,- nearly resolved with steroids -All joints affected, no morning stiffness, normal ESRa nd CRP  Now has recrudenscen of this while on Brigatinib but not as severe.  Recently has noticed more fatigue and stifnnes in body  -following with palliative;   -on methadone 2.5 mg in AM and 5 mg at HS; oxycodone 5-10 mg po q 4 hours prn; Celecoxib 100 mg po BID           # PE: provoked by malignancy vs bevacizumab in 11/2022  -New right-sided pleuritic chest pain, tachycardiam, CT showing rt sided sub segmental PE wo   -- on rivaroxiabn 20 mg daily  -     # G3 diastolic HTN - led to one dose hold of bevacizumab and now discontinued  Todays /115. Asked him to go to ER, but pt attributed this toto anxietya nd pain. Adviesed pt to got o ER if he has any enurologic symptoms, rxplained risk of stroke or other CV problems.   Pt agreed to monitor BP at home.  -WIll ioncrease hydrochlorothiazide to 50 mg and conitnue lisinoprila nd propranol     # Type 2 Diabetes:    #hyperglycemia: Acute on chronic--prompting last admission. Reports glucose has been stable. Following with endo.  Admission 2 times ofr uncontrolled blood sugar ISelf monitoring of blood glucose is not at goal of fasting  mg/dL. Last BG . On insulin and metformin 500mg ER every day , hopefully now he is off dex, it should be better.   --Started using Mohan 2 continous glucose monitor  --FOllows medication management-   --on  Metformin and insulin         #normocytic anemia: sudden drop to <7, requiring 1 unit blood transfusion. Lab work up unrevealing. Has recovered to baseline  -consider EGD if hgb drops again, risk for GI bleed with chronic steroid use           #right chest/rib pain---> intermittent- improved  Reproducible and intermittent, worse with movement of torso. Unsure of etiology but we agreed to monitor given mild, possibly msk, and no correlating respiratory or cardiac symptoms.          #MSSA infection, Sepsis at pleurex site- resolved  # Pleural effusion: s/p pleurex palcement  2/16/22. Then on 4/2 right PleurX catheter s/p removal for severe sepsis due to MSSA infection.      #sharp right chest pain:  I suspect this is residual nerve damage from his prior pleurex given its onset and longevity. Hopefully methadone will be helpful for this if it is in fact neuropathic pain.        On the day of service  Chart review: 5 minutes  Visit duration: 45 minutes  Care coordination: 5 minutes    Bassam Persaud MD    Hematology, Oncology and Transplantation

## 2023-07-12 NOTE — TELEPHONE ENCOUNTER
Patient calling for a refill of Oxycodone.     DOS: 6/27/23  Procedure: Right Stealth craniotomy and resection of brain metastasis  Surgeon: Dr dixon  Weeks Post Op: 2      Current symptom(s) and Current pain management: See note from 2 week office visit    Last fill: 7/5/23  Next visit: 8/8/23    Medication pended for your approval, if appropriate. Pharmacy verified.     Any patient questions or concerns: no    Informed patient request will be forwarded to care team.

## 2023-07-12 NOTE — PROGRESS NOTES
Post-op Nurse Visit:    Patient presents today s/p Right stealth craniotomy and resection of Brain metastasis on 6/27/23 per the order of Stephen Moran MD .  accompanies patient.    Pain  5/10 to Head described as dull, achey, constant    Pain Relief Measures:  Oxycodone: 10mg TID  Tylenol: TID  Ice: ice     Weaned from steroid?: Yes  Weaned from anti-seizure med?: No    Neuro Assessment  Denies new onset of weakness, acute changes in the level of consciousness (increased confusion, memory loss, speech abnormalities), any change in hearing or vision, increased headaches, or new onset of seizures.    Symptoms compared to pre-op: some more forgetfulness, dull achey headache compared to severe HAs prior to surgery  Equal strength to bilateral upper extremities  Neurologically intact    Incision  Incision inspected. Edges well-approximated. No redness, swelling, drainage, warmth, or fever noted. suture(s) removed without difficulty.     Return to work discussed: yes    All of patient's questions addressed today. Patient was instructed to call with any additional questions/concerns.

## 2023-07-12 NOTE — PROGRESS NOTES
"Connor Emerson is a 43 year old male who presents for:  Chief Complaint   Patient presents with     Surgical Followup     2 wk f/U R [facility] craniotomy         Initial Vitals:  BP (!) 142/106   Pulse 59   Temp 98.1  F (36.7  C)   Ht 1.753 m (5' 9\")   Wt 91.2 kg (201 lb)   SpO2 98%   BMI 29.68 kg/m   Estimated body mass index is 29.68 kg/m  as calculated from the following:    Height as of this encounter: 1.753 m (5' 9\").    Weight as of this encounter: 91.2 kg (201 lb).. Body surface area is 2.11 meters squared. BP completed using cuff size: regular  Moderate Pain (5)    Nursing Comments:     Ana Maria Arreguin MA    "

## 2023-07-13 ENCOUNTER — DOCUMENTATION ONLY (OUTPATIENT)
Dept: ONCOLOGY | Facility: CLINIC | Age: 43
End: 2023-07-13
Payer: MEDICAID

## 2023-07-13 ENCOUNTER — TELEPHONE (OUTPATIENT)
Dept: ONCOLOGY | Facility: CLINIC | Age: 43
End: 2023-07-13
Payer: MEDICAID

## 2023-07-13 RX ORDER — OXYCODONE HYDROCHLORIDE 10 MG/1
10 TABLET ORAL
Qty: 40 TABLET | Refills: 0 | Status: SHIPPED | OUTPATIENT
Start: 2023-07-13 | End: 2023-07-18

## 2023-07-13 NOTE — ORAL ONC MGMT
Oral Chemotherapy Monitoring Program     Placed call to patient in follow up of oral chemotherapy regarding dose reduction of Alunbrig. Connor took 120 mg (4 tablets) this morning and will continue at this lower dose. He has 3 bottles of 30 tablets each which is about 3 weeks supply of the lower dose.     Will call in 1 week to check patient's arthralgias, fatigue, stiffness.     Maria De Jesus Dubose, PharmD  Oral Chemotherapy Monitoring Program  [facility]  [phone]

## 2023-07-18 ENCOUNTER — MYC REFILL (OUTPATIENT)
Dept: FAMILY MEDICINE | Facility: CLINIC | Age: 43
End: 2023-07-18
Payer: MEDICAID

## 2023-07-18 ENCOUNTER — MYC REFILL (OUTPATIENT)
Dept: NEUROSURGERY | Facility: CLINIC | Age: 43
End: 2023-07-18
Payer: MEDICAID

## 2023-07-18 DIAGNOSIS — D49.6 BRAIN TUMOR (H): ICD-10-CM

## 2023-07-18 DIAGNOSIS — Z98.890 S/P CRANIOTOMY: ICD-10-CM

## 2023-07-18 RX ORDER — ACETAMINOPHEN 325 MG/1
975 TABLET ORAL EVERY 8 HOURS
Qty: 40 TABLET | Refills: 0 | Status: SHIPPED | OUTPATIENT
Start: 2023-07-18 | End: 2023-12-27

## 2023-07-18 RX ORDER — OXYCODONE HYDROCHLORIDE 10 MG/1
10 TABLET ORAL
Qty: 40 TABLET | Refills: 0 | Status: SHIPPED | OUTPATIENT
Start: 2023-07-18 | End: 2023-07-24

## 2023-07-18 RX ORDER — ACETAMINOPHEN 325 MG/1
975 TABLET ORAL EVERY 8 HOURS
Qty: 40 TABLET | Refills: 0 | Status: CANCELLED | OUTPATIENT
Start: 2023-07-18

## 2023-07-18 NOTE — CONFIDENTIAL NOTE
Patient requesting refill of Oxycodone and Acetaminophen.     DOS: 6/27/23  Procedure: Right Stealth craniotomy and resection of brain metastasis  Surgeon: Dr. Moran  Weeks Post op: 3 weeks  Last refilled: 7/13 oxycodone #40. 7/5 acetaminophen #40.    Pain assessment completed via [employer]. Please see encounter for further details. Refill request will be forwarded to care team.

## 2023-07-19 RX ORDER — LIDOCAINE/PRILOCAINE 2.5 %-2.5%
CREAM (GRAM) TOPICAL PRN
Qty: 30 G | Refills: 6 | Status: SHIPPED | OUTPATIENT
Start: 2023-07-19 | End: 2024-06-19

## 2023-07-20 ENCOUNTER — INFUSION THERAPY VISIT (OUTPATIENT)
Dept: ONCOLOGY | Facility: CLINIC | Age: 43
End: 2023-07-20
Attending: STUDENT IN AN ORGANIZED HEALTH CARE EDUCATION/TRAINING PROGRAM
Payer: COMMERCIAL

## 2023-07-20 ENCOUNTER — APPOINTMENT (OUTPATIENT)
Dept: LAB | Facility: CLINIC | Age: 43
End: 2023-07-20
Attending: STUDENT IN AN ORGANIZED HEALTH CARE EDUCATION/TRAINING PROGRAM
Payer: COMMERCIAL

## 2023-07-20 VITALS
HEART RATE: 71 BPM | TEMPERATURE: 98.6 F | RESPIRATION RATE: 18 BRPM | BODY MASS INDEX: 32.36 KG/M2 | OXYGEN SATURATION: 96 % | SYSTOLIC BLOOD PRESSURE: 154 MMHG | WEIGHT: 219.1 LBS | DIASTOLIC BLOOD PRESSURE: 105 MMHG

## 2023-07-20 DIAGNOSIS — I67.89 RADIATION THERAPY INDUCED BRAIN NECROSIS: Primary | ICD-10-CM
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
## 2023-07-20 DIAGNOSIS — C34.31 MALIGNANT NEOPLASM OF LOWER LOBE OF RIGHT LUNG (H): ICD-10-CM

## 2023-07-20 DIAGNOSIS — C34.90 NON-SMALL CELL LUNG CANCER (NSCLC) (H): ICD-10-CM

## 2023-07-20 DIAGNOSIS — Z79.899 ENCOUNTER FOR LONG-TERM (CURRENT) USE OF MEDICATIONS: ICD-10-CM

## 2023-07-20 DIAGNOSIS — C79.31 MALIGNANT NEOPLASM METASTATIC TO BRAIN (H): ICD-10-CM

## 2023-07-20 LAB
ALBUMIN SERPL BCG-MCNC: 3.9 G/DL (ref 3.5–5.2)
ALBUMIN UR-MCNC: NEGATIVE MG/DL
ALP SERPL-CCNC: 82 U/L (ref 40–129)
ALT SERPL W P-5'-P-CCNC: 29 U/L (ref 0–70)
AMYLASE SERPL-CCNC: 61 U/L (ref 28–100)
ANION GAP SERPL CALCULATED.3IONS-SCNC: 11 MMOL/L (ref 7–15)
AST SERPL W P-5'-P-CCNC: 21 U/L (ref 0–45)
BASOPHILS # BLD AUTO: 0.1 10E3/UL (ref 0–0.2)
BASOPHILS NFR BLD AUTO: 1 %
BILIRUB SERPL-MCNC: 0.5 MG/DL
BUN SERPL-MCNC: 14.8 MG/DL (ref 6–20)
CALCIUM SERPL-MCNC: 9.2 MG/DL (ref 8.6–10)
CHLORIDE SERPL-SCNC: 105 MMOL/L (ref 98–107)
CK SERPL-CCNC: 63 U/L (ref 39–308)
CREAT SERPL-MCNC: 0.52 MG/DL (ref 0.67–1.17)
DEPRECATED HCO3 PLAS-SCNC: 28 MMOL/L (ref 22–29)
EOSINOPHIL # BLD AUTO: 0.3 10E3/UL (ref 0–0.7)
EOSINOPHIL NFR BLD AUTO: 4 %
ERYTHROCYTE [DISTWIDTH] IN BLOOD BY AUTOMATED COUNT: 12.7 % (ref 10–15)
GFR SERPL CREATININE-BSD FRML MDRD: >90 ML/MIN/1.73M2
GLUCOSE SERPL-MCNC: 256 MG/DL (ref 70–99)
HCT VFR BLD AUTO: 43.9 % (ref 40–53)
HGB BLD-MCNC: 14.6 G/DL (ref 13.3–17.7)
IMM GRANULOCYTES # BLD: 0 10E3/UL
IMM GRANULOCYTES NFR BLD: 0 %
LIPASE SERPL-CCNC: 57 U/L (ref 13–60)
LYMPHOCYTES # BLD AUTO: 2.6 10E3/UL (ref 0.8–5.3)
LYMPHOCYTES NFR BLD AUTO: 32 %
MAGNESIUM SERPL-MCNC: 1.7 MG/DL (ref 1.7–2.3)
MCH RBC QN AUTO: 30.9 PG (ref 26.5–33)
MCHC RBC AUTO-ENTMCNC: 33.3 G/DL (ref 31.5–36.5)
MCV RBC AUTO: 93 FL (ref 78–100)
MONOCYTES # BLD AUTO: 0.5 10E3/UL (ref 0–1.3)
MONOCYTES NFR BLD AUTO: 6 %
NEUTROPHILS # BLD AUTO: 4.7 10E3/UL (ref 1.6–8.3)
NEUTROPHILS NFR BLD AUTO: 57 %
NRBC # BLD AUTO: 0 10E3/UL
NRBC BLD AUTO-RTO: 0 /100
PLATELET # BLD AUTO: 195 10E3/UL (ref 150–450)
POTASSIUM SERPL-SCNC: 4.1 MMOL/L (ref 3.4–5.3)
PROT SERPL-MCNC: 6.3 G/DL (ref 6.4–8.3)
RBC # BLD AUTO: 4.73 10E6/UL (ref 4.4–5.9)
SODIUM SERPL-SCNC: 144 MMOL/L (ref 136–145)
WBC # BLD AUTO: 8.2 10E3/UL (ref 4–11)

## 2023-07-20 PROCEDURE — 82550 ASSAY OF CK (CPK): CPT

## 2023-07-20 PROCEDURE — 80053 COMPREHEN METABOLIC PANEL: CPT

## 2023-07-20 PROCEDURE — 258N000003 HC RX IP 258 OP 636: Performed by: STUDENT IN AN ORGANIZED HEALTH CARE EDUCATION/TRAINING PROGRAM

## 2023-07-20 PROCEDURE — 82150 ASSAY OF AMYLASE: CPT

## 2023-07-20 PROCEDURE — 83690 ASSAY OF LIPASE: CPT

## 2023-07-20 PROCEDURE — 250N000011 HC RX IP 250 OP 636: Mod: JZ | Performed by: STUDENT IN AN ORGANIZED HEALTH CARE EDUCATION/TRAINING PROGRAM

## 2023-07-20 PROCEDURE — 81003 URINALYSIS AUTO W/O SCOPE: CPT | Performed by: STUDENT IN AN ORGANIZED HEALTH CARE EDUCATION/TRAINING PROGRAM

## 2023-07-20 PROCEDURE — 85025 COMPLETE CBC W/AUTO DIFF WBC: CPT

## 2023-07-20 PROCEDURE — 96413 CHEMO IV INFUSION 1 HR: CPT

## 2023-07-20 PROCEDURE — 83735 ASSAY OF MAGNESIUM: CPT | Performed by: STUDENT IN AN ORGANIZED HEALTH CARE EDUCATION/TRAINING PROGRAM

## 2023-07-20 PROCEDURE — 36415 COLL VENOUS BLD VENIPUNCTURE: CPT

## 2023-07-20 RX ORDER — MEPERIDINE HYDROCHLORIDE 25 MG/ML
25 INJECTION INTRAMUSCULAR; INTRAVENOUS; SUBCUTANEOUS EVERY 30 MIN PRN
Status: CANCELLED | OUTPATIENT
Start: 2023-07-20

## 2023-07-20 RX ORDER — ALBUTEROL SULFATE 0.83 MG/ML
2.5 SOLUTION RESPIRATORY (INHALATION)
Status: CANCELLED | OUTPATIENT
Start: 2023-07-20

## 2023-07-20 RX ORDER — DIPHENHYDRAMINE HYDROCHLORIDE 50 MG/ML
50 INJECTION INTRAMUSCULAR; INTRAVENOUS
Status: CANCELLED
Start: 2023-07-20

## 2023-07-20 RX ORDER — HEPARIN SODIUM (PORCINE) LOCK FLUSH IV SOLN 100 UNIT/ML 100 UNIT/ML
5 SOLUTION INTRAVENOUS
Status: CANCELLED | OUTPATIENT
Start: 2023-07-20

## 2023-07-20 RX ORDER — LORAZEPAM 2 MG/ML
0.5 INJECTION INTRAMUSCULAR EVERY 4 HOURS PRN
Status: CANCELLED | OUTPATIENT
Start: 2023-07-20

## 2023-07-20 RX ORDER — EPINEPHRINE 1 MG/ML
0.3 INJECTION, SOLUTION INTRAMUSCULAR; SUBCUTANEOUS EVERY 5 MIN PRN
Status: CANCELLED | OUTPATIENT
Start: 2023-07-20

## 2023-07-20 RX ORDER — HEPARIN SODIUM,PORCINE 10 UNIT/ML
5-20 VIAL (ML) INTRAVENOUS DAILY PRN
Status: CANCELLED | OUTPATIENT
Start: 2023-07-20

## 2023-07-20 RX ORDER — METHYLPREDNISOLONE SODIUM SUCCINATE 125 MG/2ML
125 INJECTION, POWDER, LYOPHILIZED, FOR SOLUTION INTRAMUSCULAR; INTRAVENOUS
Status: CANCELLED
Start: 2023-07-20

## 2023-07-20 RX ORDER — ALBUTEROL SULFATE 90 UG/1
1-2 AEROSOL, METERED RESPIRATORY (INHALATION)
Status: CANCELLED
Start: 2023-07-20

## 2023-07-20 RX ADMIN — SODIUM CHLORIDE 250 ML: 9 INJECTION, SOLUTION INTRAVENOUS at 17:03

## 2023-07-20 RX ADMIN — BEVACIZUMAB 1500 MG: 400 INJECTION, SOLUTION INTRAVENOUS at 17:03

## 2023-07-20 ASSESSMENT — PAIN SCALES - GENERAL: PAINLEVEL: NO PAIN (0)

## 2023-07-20 NOTE — PROGRESS NOTES
Infusion Nursing Note:  Connor Emerson presents today for Cycle 1 Day 1 bevacizumab.    Patient seen by provider today: No   present during visit today: Not Applicable.    Note: Connor presents today feeling overall okay. Denies pain or nausea/vomiting. Denies fevers/chills. Offers no concerns at today's visit.    Hypertensive: 151/104, 154/105 taken 15 minutes apart. Connor confirms that he took his blood pressure medications today. He intermittently checks his blood pressure at home, and it is usually a little lower than this.    ARIELLE Persaud/Olesya Gil, RN [phone]  Ok to proceed with avastin today  Have Connor double propranolol dose (to 40 mg BID) and monitor blood pressures at home      Intravenous Access:  Peripheral IV placed in lab.    Treatment Conditions:     Latest Reference Range & Units 07/20/23 15:36   Sodium 136 - 145 mmol/L 144   Potassium 3.4 - 5.3 mmol/L 4.1   Chloride 98 - 107 mmol/L 105   Carbon Dioxide (CO2) 22 - 29 mmol/L 28   Urea Nitrogen 6.0 - 20.0 mg/dL 14.8   Creatinine 0.67 - 1.17 mg/dL 0.52 (L)   GFR Estimate >60 mL/min/1.73m2 >90   Calcium 8.6 - 10.0 mg/dL 9.2   Anion Gap 7 - 15 mmol/L 11   Magnesium 1.7 - 2.3 mg/dL 1.7   Albumin 3.5 - 5.2 g/dL 3.9   Protein Total 6.4 - 8.3 g/dL 6.3 (L)   Alkaline Phosphatase 40 - 129 U/L 82   ALT 0 - 70 U/L 29   AST 0 - 45 U/L 21   Amylase 28 - 100 U/L 61   Bilirubin Total <=1.2 mg/dL 0.5   CK Total 39 - 308 U/L 63   Glucose 70 - 99 mg/dL 256 (H)   Lipase 13 - 60 U/L 57   WBC 4.0 - 11.0 10e3/uL 8.2   Hemoglobin 13.3 - 17.7 g/dL 14.6   Hematocrit 40.0 - 53.0 % 43.9   Platelet Count 150 - 450 10e3/uL 195   RBC Count 4.40 - 5.90 10e6/uL 4.73   MCV 78 - 100 fL 93   MCH 26.5 - 33.0 pg 30.9   MCHC 31.5 - 36.5 g/dL 33.3   RDW 10.0 - 15.0 % 12.7   % Neutrophils % 57   % Lymphocytes % 32   % Monocytes % 6   % Eosinophils % 4   % Basophils % 1   Absolute Basophils 0.0 - 0.2 10e3/uL 0.1   Absolute Eosinophils 0.0 - 0.7 10e3/uL 0.3   Absolute Immature  Granulocytes <=0.4 10e3/uL 0.0   Absolute Lymphocytes 0.8 - 5.3 10e3/uL 2.6   Absolute Monocytes 0.0 - 1.3 10e3/uL 0.5   % Immature Granulocytes % 0   Absolute Neutrophils 1.6 - 8.3 10e3/uL 4.7   Absolute NRBCs 10e3/uL 0.0   NRBCs per 100 WBC <1 /100 0   Protein Albumin Urine Negative mg/dL Negative     Results reviewed, labs MET treatment parameters, ok to proceed with treatment.  Blood pressure: 151/104, 154/105. See TORB above.      Post Infusion Assessment:  Patient tolerated infusion without incident.  Blood return noted pre and post infusion.  Site patent and intact, free from redness, edema or discomfort.  No evidence of extravasations.  Access discontinued per protocol.       Discharge Plan:   Patient declined prescription refills.  Discharge instructions reviewed with: Patient.  Patient and/or family verbalized understanding of discharge instructions and all questions answered.  AVS to patient via [employer].  Patient will return 08/07 for next appointment.   Patient discharged in stable condition accompanied by: self.  Departure Mode: Ambulatory.      Olesya Gil RN

## 2023-07-20 NOTE — NURSING NOTE
Chief Complaint   Patient presents with     Blood Draw     Labs drawn via  by RN in lab. VS taken.      Labs collected from venipuncture by RN. Vitals taken. Checked in for appointment(s). PIV placed via vascular access.     Michelle Montanez RN

## 2023-07-20 NOTE — PATIENT INSTRUCTIONS
Per Dr. Persaud: please increase your propranolol dose to 40 mg twice daily for your blood pressure. Monitor blood pressure at home, daily if possible.    [facility] Triage and after hours / weekends / holidays:  [phone]    Please call the triage or after hours line if you experience a temperature greater than or equal to 100.4, shaking chills, have uncontrolled nausea, vomiting and/or diarrhea, dizziness, shortness of breath, chest pain, bleeding, unexplained bruising, or if you have any other new/concerning symptoms, questions or concerns.      If you are having any concerning symptoms or wish to speak to a provider before your next infusion visit, please call your care coordinator or triage to notify them so we can adequately serve you.     If you need a refill on a narcotic prescription or other medication, please call before your infusion appointment.

## 2023-07-21 ENCOUNTER — MYC MEDICAL ADVICE (OUTPATIENT)
Dept: ONCOLOGY | Facility: CLINIC | Age: 43
End: 2023-07-21
Payer: MEDICAID

## 2023-07-21 NOTE — TELEPHONE ENCOUNTER
Oral Chemotherapy Monitoring Program     Placed call to patient in follow up of dose reduction of brigatinib. I was unable to leave a message, because his voicemail box was full. Sent [employer] message requesting phone call or update through message.    Loli Nichole, PharmD, BCOP  Oral Chemotherapy Monitoring Program  [facility]   [phone]

## 2023-07-24 ENCOUNTER — VIRTUAL VISIT (OUTPATIENT)
Dept: ONCOLOGY | Facility: CLINIC | Age: 43
End: 2023-07-24
Attending: SOCIAL WORKER
Payer: COMMERCIAL

## 2023-07-24 DIAGNOSIS — Z98.890 S/P CRANIOTOMY: ICD-10-CM

## 2023-07-24 DIAGNOSIS — R51.9 HEADACHE: Primary | ICD-10-CM

## 2023-07-24 DIAGNOSIS — D49.6 BRAIN TUMOR (H): ICD-10-CM

## 2023-07-24 DIAGNOSIS — F41.9 ANXIETY: Primary | ICD-10-CM

## 2023-07-24 PROCEDURE — 90832 PSYTX W PT 30 MINUTES: CPT | Mod: 95 | Performed by: SOCIAL WORKER

## 2023-07-24 RX ORDER — OXYCODONE HYDROCHLORIDE 10 MG/1
10 TABLET ORAL
Qty: 60 TABLET | Refills: 0 | Status: SHIPPED | OUTPATIENT
Start: 2023-07-24 | End: 2023-08-06

## 2023-07-24 RX ORDER — OXYCODONE HYDROCHLORIDE 10 MG/1
10 TABLET ORAL
Qty: 40 TABLET | Refills: 0 | Status: SHIPPED | OUTPATIENT
Start: 2023-07-24 | End: 2023-07-24

## 2023-07-24 NOTE — TELEPHONE ENCOUNTER
Patient requesting a refill:    Oxy- 5-6 tablets left  Wife states it was a rough weekend    Pharmacy is the [facility] in [place]

## 2023-07-24 NOTE — LETTER
7/24/2023         RE: Connor Emerson  [address]  [place] [address]        Dear Colleague,    Thank you for referring your patient, Connor Emerson, to the [facility]. Please see a copy of my visit note below.    Telemedicine Visit: The patient's condition can be safely assessed and treated via synchronous audio and visual telemedicine encounter.      Reason for Telemedicine Visit: Patient has requested telehealth visit    Originating Site (Patient Location): Patient's place of employment        Distant Location (provider location):  Off-site    Consent:  The patient/guardian has verbally consented to: the potential risks and benefits of telemedicine (video visit) versus in person care; bill my insurance or make self-payment for services provided; and responsibility for payment of non-covered services.     Mode of Communication:  Video Conference via [employer]    As the provider I attest to compliance with applicable laws and regulations related to telemedicine.     Palliative Care Clinical Social Work Return Appointment    PLEASE NOTE:  THIS IS A MENTAL HEALTH NOTE.  OTHER PROVIDERS VIEWING THIS NOTE SHOULD USE THIS ONLY FOR UNDERSTANDING THE CONTEXT OF THE PATIENT'S EXPERIENCE.  TOPICS DESCRIBED IN THIS NOTE SHOULD NOT BE REFERENCED TO THE PATIENT BY MEDICAL PROVIDERS.    Connor Emerson is a 43 year old man with diagnosis of metastatic lung cancer, seen today via [employer] for a return psychotherapy appointment to address anxiety  as it relates to coping with illness and treatment.      Mental Status Exam:(List all that apply)      Appearance: Appropriate      Eye Contact: Good       Orientation: Yes, x4      Mood: Normal  Fatigued         Affect: difficult to assess via phone       Thought Content: Clear         Thought Form: Logical      Psychomotor Behavior: Normal    Visit themes:     Adjustment to Illness: Had infusion on Thursday of last week, has been  "sick with nausea and vomiting since Friday.  Palliative MD notified.  He is not sure if he can tolerate another infusion if symptom burden continues to be so heavy.     Placed on paid medical leave at work.  Struggling with this as it was an executive decision by his employer and Connor feels it made without his input.  Was told by his employer that this was done legally, Connor is not sure -- I gave him contact number for the cancer legal line.        Mental Health (thoughts, feelings, actions, coping, and symptoms): Because of symptom burden today we focused most of our session on that.  He describes feeling demoralized by the decision for medical leave by his employer as Connor was under the impression he was meeting the demands of his job without issue.   Continues to struggle with adjusting \"this is rough.  This cancer really sucks\"     Helpful activities:     Helpful cognitions:     Unhelpful and/or triggering or exacerbating factors:  Recent surgery followed by return to work.  I assume this choice was made because he is the primary income provider for his family.     Body-Mind Skills Education:     Relationships:     End of Life and Advance Care Planning:     Main therapeutic interventions provided this session include:   Facilitate structured problem solving    Plan:  Message sent to medical team.  Follow up appointment scheduled for August.     Time spent with patient/family:  35 minutes  (Start 1:00, end 1:35)    LISETH Barahona, [facility]   Palliative Care Clinical   Ph: [phone]      DO NOT SEND ANY LETTERS                Again, thank you for allowing me to participate in the care of your patient.        Sincerely,        EDUIN Barahona  "

## 2023-07-24 NOTE — PROGRESS NOTES
Telemedicine Visit: The patient's condition can be safely assessed and treated via synchronous audio and visual telemedicine encounter.      Reason for Telemedicine Visit: Patient has requested telehealth visit    Originating Site (Patient Location): Patient's place of employment        Distant Location (provider location):  Off-site    Consent:  The patient/guardian has verbally consented to: the potential risks and benefits of telemedicine (video visit) versus in person care; bill my insurance or make self-payment for services provided; and responsibility for payment of non-covered services.     Mode of Communication:  Video Conference via [employer]    As the provider I attest to compliance with applicable laws and regulations related to telemedicine.     Palliative Care Clinical Social Work Return Appointment    PLEASE NOTE:  THIS IS A MENTAL HEALTH NOTE.  OTHER PROVIDERS VIEWING THIS NOTE SHOULD USE THIS ONLY FOR UNDERSTANDING THE CONTEXT OF THE PATIENT'S EXPERIENCE.  TOPICS DESCRIBED IN THIS NOTE SHOULD NOT BE REFERENCED TO THE PATIENT BY MEDICAL PROVIDERS.    Connor Emerson is a 43 year old man with diagnosis of metastatic lung cancer, seen today via [employer] video for a return psychotherapy appointment to address anxiety  as it relates to coping with illness and treatment.      Mental Status Exam:(List all that apply)      Appearance: Appropriate      Eye Contact: Good       Orientation: Yes, x4      Mood: Normal  Fatigued         Affect: difficult to assess via phone       Thought Content: Clear         Thought Form: Logical      Psychomotor Behavior: Normal    Visit themes:     Adjustment to Illness: Had infusion on Thursday of last week, has been sick with nausea and vomiting since Friday.  Palliative MD notified.  He is not sure if he can tolerate another infusion if symptom burden continues to be so heavy.     Placed on paid medical leave at work.  Struggling with this as it was an executive decision by  "his employer and Connor feels it made without his input.  Was told by his employer that this was done legally, Connor is not sure -- I gave him contact number for the cancer legal line.        Mental Health (thoughts, feelings, actions, coping, and symptoms): Because of symptom burden today we focused most of our session on that.  He describes feeling demoralized by the decision for medical leave by his employer as Connor was under the impression he was meeting the demands of his job without issue.   Continues to struggle with adjusting \"this is rough.  This cancer really sucks\"     Helpful activities:     Helpful cognitions:     Unhelpful and/or triggering or exacerbating factors:  Recent surgery followed by return to work.  I assume this choice was made because he is the primary income provider for his family.     Body-Mind Skills Education:     Relationships:     End of Life and Advance Care Planning:     Main therapeutic interventions provided this session include:   Facilitate structured problem solving    Plan:  Message sent to medical team.  Follow up appointment scheduled for August.     Time spent with patient/family:  35 minutes  (Start 1:00, end 1:35)    LISETH Barahona, [facility]   Palliative Care Clinical   Ph: [phone]      DO NOT SEND ANY LETTERS              "

## 2023-07-24 NOTE — LETTER
7/24/2023         RE: Connor Emerson  [address]  [place] [address]        Dear Colleague,    Thank you for referring your patient, Connor Emerson, to the [facility]. Please see a copy of my visit note below.    Telemedicine Visit: The patient's condition can be safely assessed and treated via synchronous audio and visual telemedicine encounter.      Reason for Telemedicine Visit: Patient has requested telehealth visit    Originating Site (Patient Location): Patient's place of employment        Distant Location (provider location):  Off-site    Consent:  The patient/guardian has verbally consented to: the potential risks and benefits of telemedicine (video visit) versus in person care; bill my insurance or make self-payment for services provided; and responsibility for payment of non-covered services.     Mode of Communication:  Video Conference via [employer]    As the provider I attest to compliance with applicable laws and regulations related to telemedicine.     Palliative Care Clinical Social Work Return Appointment    PLEASE NOTE:  THIS IS A MENTAL HEALTH NOTE.  OTHER PROVIDERS VIEWING THIS NOTE SHOULD USE THIS ONLY FOR UNDERSTANDING THE CONTEXT OF THE PATIENT'S EXPERIENCE.  TOPICS DESCRIBED IN THIS NOTE SHOULD NOT BE REFERENCED TO THE PATIENT BY MEDICAL PROVIDERS.    Connor Emerson is a 43 year old man with diagnosis of metastatic lung cancer, seen today via [employer] for a return psychotherapy appointment to address anxiety  as it relates to coping with illness and treatment.      Mental Status Exam:(List all that apply)      Appearance: Appropriate      Eye Contact: Good       Orientation: Yes, x4      Mood: Normal  Fatigued         Affect: difficult to assess via phone       Thought Content: Clear         Thought Form: Logical      Psychomotor Behavior: Normal    Visit themes:     Adjustment to Illness: Had infusion on Thursday of last week, has been  "sick with nausea and vomiting since Friday.  Palliative MD notified.  He is not sure if he can tolerate another infusion if symptom burden continues to be so heavy.     Placed on paid medical leave at work.  Struggling with this as it was an executive decision by his employer and Connor feels it made without his input.  Was told by his employer that this was done legally, Connor is not sure -- I gave him contact number for the cancer legal line.        Mental Health (thoughts, feelings, actions, coping, and symptoms): Because of symptom burden today we focused most of our session on that.  He describes feeling demoralized by the decision for medical leave by his employer as Connor was under the impression he was meeting the demands of his job without issue.   Continues to struggle with adjusting \"this is rough.  This cancer really sucks\"     Helpful activities:     Helpful cognitions:     Unhelpful and/or triggering or exacerbating factors:  Recent surgery followed by return to work.  I assume this choice was made because he is the primary income provider for his family.     Body-Mind Skills Education:     Relationships:     End of Life and Advance Care Planning:     Main therapeutic interventions provided this session include:   Facilitate structured problem solving    Plan:  Message sent to medical team.  Follow up appointment scheduled for August.     Time spent with patient/family:  35 minutes  (Start 1:00, end 1:35)    LISETH Barahona, [facility]   Palliative Care Clinical   Ph: [phone]      DO NOT SEND ANY LETTERS                Again, thank you for allowing me to participate in the care of your patient.        Sincerely,        EDUIN Barahona  "

## 2023-07-24 NOTE — TELEPHONE ENCOUNTER
Medication approved. MRI ordered and scheduled for Thurs 7/27 at 730A. Called and updated patient and significant other.

## 2023-07-24 NOTE — TELEPHONE ENCOUNTER
Patient calling for a refill of Oxycodone.     DOS: 6/27/23  Procedure Right Stealth craniotomy and resection of brain metastasis.   Surgeon: Dr Moran  Weeks Post Op: 4    Current symptom(s): Severe Headaches 8-9/10, beginning last Thurs. Does not want to get out of bed due to pain. Also complains of nausea and vomiting when pain is at its worse. Prior to Thurs, headaches were 4/10. No other symptoms. No vision changes, change in LOC, no seizures, weakness.     Reviewed Red Flags and advised to go to ED if any should occur, or if headaches uncontrolled, intolerable.     Current pain management: Oxy 10mg every 4 hours. Tylenol, ice.     Last fill: 7/18/23  Next visit: 8/8/23    Medication pended for your approval, if appropriate. Pharmacy verified.     Discussed with Dr Moran. MRI Brain ordered. Message sent to schedulers.     Informed patient request will be forwarded to care team.

## 2023-07-25 ENCOUNTER — TELEPHONE (OUTPATIENT)
Dept: ONCOLOGY | Facility: CLINIC | Age: 43
End: 2023-07-25
Payer: MEDICAID

## 2023-07-25 NOTE — ORAL ONC MGMT
Oral Chemotherapy Monitoring Program     Placed call to patient in follow up of Alunbrig therapy.     Left message to please call back in follow-up of therapy. No patient or drug names were mentioned.     Jayden Herrera PharmD  [facility]  [phone]  July 25, 2023

## 2023-07-27 ENCOUNTER — HOSPITAL ENCOUNTER (OUTPATIENT)
Dept: MRI IMAGING | Facility: CLINIC | Age: 43
Discharge: HOME | End: 2023-07-27
Attending: NEUROLOGICAL SURGERY | Admitting: NEUROLOGICAL SURGERY
Payer: COMMERCIAL

## 2023-07-27 DIAGNOSIS — R51.9 HEADACHE: ICD-10-CM

## 2023-07-27 DIAGNOSIS — Z98.890 S/P CRANIOTOMY: ICD-10-CM

## 2023-07-27 DIAGNOSIS — D49.6 BRAIN TUMOR (H): ICD-10-CM

## 2023-07-27 PROCEDURE — 70553 MRI BRAIN STEM W/O & W/DYE: CPT

## 2023-07-27 PROCEDURE — 255N000002 HC RX 255 OP 636: Mod: JZ | Performed by: NEUROLOGICAL SURGERY

## 2023-07-27 PROCEDURE — A9585 GADOBUTROL INJECTION: HCPCS | Mod: JZ | Performed by: NEUROLOGICAL SURGERY

## 2023-07-27 RX ORDER — GADOBUTROL 604.72 MG/ML
10 INJECTION INTRAVENOUS ONCE
Status: COMPLETED | OUTPATIENT
Start: 2023-07-27 | End: 2023-07-27

## 2023-07-27 RX ADMIN — GADOBUTROL 10 ML: 604.72 INJECTION INTRAVENOUS at 07:12

## 2023-07-28 ENCOUNTER — TELEPHONE (OUTPATIENT)
Dept: ONCOLOGY | Facility: CLINIC | Age: 43
End: 2023-07-28
Payer: MEDICAID

## 2023-07-28 DIAGNOSIS — C34.31 MALIGNANT NEOPLASM OF LOWER LOBE OF RIGHT LUNG (H): Primary | ICD-10-CM

## 2023-07-28 NOTE — ORAL ONC MGMT
Oral Chemotherapy Monitoring Program     Placed call to patient for an assessment of brigatinib. Voice mailbox was full so unable to leave a voicemail. The oral chemo team already sent Connor a [employer] message last week regarding this assessment - still no response yet.    Zakiya Sheridan, PharmD, BCPS  Hematology/Oncology Clinical Pharmacist  Oral Chemotherapy Monitoring Program  [facility]  [phone]

## 2023-08-02 ENCOUNTER — TELEPHONE (OUTPATIENT)
Dept: ONCOLOGY | Facility: CLINIC | Age: 43
End: 2023-08-02
Payer: MEDICAID

## 2023-08-02 NOTE — ORAL ONC MGMT
Oral Chemotherapy Monitoring Program     Placed call to patient in follow up of oral chemotherapy. Calling to assess tolerance since dose reduction. Patient did not answer phone and unable to leave voicemail as mailbox was full. We have made several unsuccessful attempts to reach patient. Patient has a follow up appointment scheduled with Chelsea Villegas 8/4/23 - will review her notes.    Erna Rashid, PharmD, [facility]  Oral Chemotherapy Monitoring Program  [facility]  [phone]  August 2, 2023

## 2023-08-04 ENCOUNTER — TELEPHONE (OUTPATIENT)
Dept: ONCOLOGY | Facility: CLINIC | Age: 43
End: 2023-08-04
Payer: MEDICAID

## 2023-08-06 ENCOUNTER — MYC REFILL (OUTPATIENT)
Dept: PALLIATIVE CARE | Facility: CLINIC | Age: 43
End: 2023-08-06
Payer: MEDICAID

## 2023-08-06 ENCOUNTER — MYC REFILL (OUTPATIENT)
Dept: RADIATION ONCOLOGY | Facility: HOSPITAL | Age: 43
End: 2023-08-06
Payer: MEDICAID

## 2023-08-06 DIAGNOSIS — Z98.890 S/P CRANIOTOMY: ICD-10-CM

## 2023-08-06 DIAGNOSIS — G89.3 CANCER ASSOCIATED PAIN: ICD-10-CM

## 2023-08-06 DIAGNOSIS — D49.6 BRAIN TUMOR (H): ICD-10-CM

## 2023-08-07 DIAGNOSIS — C34.31 MALIGNANT NEOPLASM OF LOWER LOBE OF RIGHT LUNG (H): Primary | ICD-10-CM

## 2023-08-07 RX ORDER — OXYCODONE HYDROCHLORIDE 10 MG/1
10 TABLET ORAL
Qty: 60 TABLET | Refills: 0 | Status: SHIPPED | OUTPATIENT
Start: 2023-08-07 | End: 2023-08-14

## 2023-08-07 RX ORDER — METHADONE HYDROCHLORIDE 5 MG/1
TABLET ORAL
Qty: 45 TABLET | Refills: 0 | Status: SHIPPED | OUTPATIENT
Start: 2023-08-07 | End: 2023-09-03

## 2023-08-07 NOTE — TELEPHONE ENCOUNTER
Received [employer] message from patient requesting refill of methadone.     Last refill: 6/20/23  Last office visit: 6/6/23  Scheduled for follow up 8/14/23     Will route request to MD for review.     Reviewed [place]  Report.

## 2023-08-07 NOTE — TELEPHONE ENCOUNTER
Received [employer] message from patient requesting refill of oxycodone.     Last refill: 7/28/23  Last office visit: 6/6/23  Scheduled for follow up 8/14/23     Will route request to MD for review.     Reviewed [place]  Report.

## 2023-08-08 ENCOUNTER — OFFICE VISIT (OUTPATIENT)
Dept: NEUROSURGERY | Facility: CLINIC | Age: 43
End: 2023-08-08
Attending: PHYSICIAN ASSISTANT
Payer: COMMERCIAL

## 2023-08-08 VITALS — DIASTOLIC BLOOD PRESSURE: 98 MMHG | OXYGEN SATURATION: 97 % | HEART RATE: 60 BPM | SYSTOLIC BLOOD PRESSURE: 144 MMHG

## 2023-08-08 DIAGNOSIS — Z98.890 S/P CRANIOTOMY: Primary | ICD-10-CM

## 2023-08-08 PROCEDURE — 99024 POSTOP FOLLOW-UP VISIT: CPT | Performed by: PHYSICIAN ASSISTANT

## 2023-08-08 PROCEDURE — G0463 HOSPITAL OUTPT CLINIC VISIT: HCPCS | Performed by: PHYSICIAN ASSISTANT

## 2023-08-08 ASSESSMENT — PAIN SCALES - GENERAL: PAINLEVEL: MILD PAIN (3)

## 2023-08-08 NOTE — LETTER
8/8/2023         RE: Connor Emerson  [address]  [facility] [address]        Dear Colleague,    Thank you for referring your patient, Connor Emerson, to the [facility] [place]. Please see a copy of my visit note below.    NEUROSURGERY CLINIC PROGRESS NOTE    DATE OF VISIT: 8/8/2023    HPI:     Connor Emerson is a pleasant 43 year old male who presents to the clinic today for a  six-week post-operative follow-up visit. On 06/27/2023 the patient underwent a right stealth craniotomy and resection of tumor with Dr. Moran. 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   Today, the patient reports that he continues to experience some headaches that are typically in the 5/10 range, today his headache is a 3/10. He has also noted some increased lethargy that he attributes to his chemotherapy.     Current Outpatient Medications   Medication     acetaminophen (TYLENOL) 325 MG tablet     albuterol (PROAIR HFA/PROVENTIL HFA/VENTOLIN HFA) 108 (90 Base) MCG/ACT inhaler     Alcohol Swabs PADS     blood glucose (NO BRAND SPECIFIED) lancets standard     blood glucose (NO BRAND SPECIFIED) test strip     blood glucose monitoring (NO BRAND SPECIFIED) meter device kit     brigatinib (ALUNBRIG) 30 MG TABS tablet     citalopram (CELEXA) 20 MG tablet     Continuous Blood Gluc Sensor (FREESTYLE IRENE 2 SENSOR) MISC     dexamethasone (DECADRON) 1 MG tablet     hydrochlorothiazide (HYDRODIURIL) 25 MG tablet     insulin aspart (NOVOLOG PEN) 100 UNIT/ML pen     insulin glargine (LANTUS PEN) 100 UNIT/ML pen     insulin pen needle (32G X 4 MM) 32G X 4 MM miscellaneous     lidocaine-prilocaine (EMLA) 2.5-2.5 % external cream     lisinopril (ZESTRIL) 40 MG tablet     metFORMIN (GLUCOPHAGE XR) 500 MG 24 hr tablet     metFORMIN (GLUCOPHAGE XR) 500 MG 24 hr tablet     methadone (DOLOPHINE) 5 MG tablet     methocarbamol (ROBAXIN) 750 MG tablet     naloxone (NARCAN) 4 MG/0.1ML  nasal spray     oxyCODONE IR (ROXICODONE) 10 MG tablet     propranolol (INDERAL) 20 MG tablet     senna-docusate (SENOKOT-S/PERICOLACE) 8.6-50 MG tablet     Sharps Container MISC     zolpidem (AMBIEN) 5 MG tablet     No current facility-administered medications for this visit.       Allergies   Allergen Reactions     Vicodin [Hydrocodone-Acetaminophen] Nausea and Vomiting and GI Disturbance       Past Medical History:   Diagnosis Date     Atypical chest pain 12/02/2013     Cancer (H)      Complication of anesthesia      Diabetes (H)      GERD (gastroesophageal reflux disease) 12/02/2013     History of pulmonary embolism      HTN (hypertension) 05/14/2012     HTN, goal below 140/90 07/02/2013     Insomnia 02/21/2012     Mediastinal lymphadenopathy      Migraine headache 07/02/2013     Migraine with aura, without mention of intractable migraine without mention of status migrainosus      Pneumonia        Review Of Systems    Skin: negative  Eyes: negative  Ears/Nose/Throat: negative  Respiratory: No shortness of breath, dyspnea on exertion, cough, or hemoptysis  Cardiovascular: negative  Gastrointestinal: negative  Musculoskeletal: negative  Neurologic: headaches  Psychiatric: negative  Hematologic/Lymphatic/Immunologic: negative  Endocrine: negative    OBJECTIVE:    BP (!) 144/98   Pulse 60   SpO2 97%     Imaging:    MRI BRAIN WITHOUT AND WITH CONTRAST 7/27/2023 7:47 AM     1. Right frontal lobe resection cavity demonstrates surrounding  vasogenic edema which is less in extent compared to 6/28/2023.  2. Persistent marginal enhancement along the right frontal resection  cavity which may represent residual tumor or posttreatment change.  3. Multiple other smaller metastases as detailed.  4. No new metastases.    Radiographic Findings: Full radiological report in chart. I personally reviewed the images with the patient today.    Exam:    He appears comfortable and in no apparent distress, moving all extremities.   CN  II-XII intact, alert and appropriate with conversation and following commands.   Able to name 3/3 objects.   Finger to nose slow and accurate.   Rapid hand movements intact.   Absent for upper and lower extremity drift.   Bilateral upper and lower extremities 5/5. DTR's wnl. Sensation intact throughout. Normal ROM.   Incision well healed.     PLAN:    Connor Emerson is six weeks out from a right stealth craniotomy and resection of tumor with Dr. Moran on 06/27/2023. 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 He has also noted some increased lethargy that he attributes to his chemotherapy. Imaging was reviewed today and all questions were answered.    His updated imaging was reviewed with him today. It was explained that the right frontal lobe resection cavity demonstrates surrounding vasogenic edema which is less in extent compared to 6/28/2023. There persistent marginal enhancement along the right frontal resection cavity which may represent residual tumor or posttreatment change. It again, also shows, multiple other smaller metastases but no new metastases.    He is also going to check with his Oncology team for further recommendation.     The patient gave verbal understanding and is in agreement with the above plan. He  will call or return to the clinic for any worsening or changes in symptoms.      Respectfully,     ERICKSON Giron PA-C      Again, thank you for allowing me to participate in the care of your patient.        Sincerely,        Alex Oliveros PA-C

## 2023-08-08 NOTE — PROGRESS NOTES
NEUROSURGERY CLINIC PROGRESS NOTE    DATE OF VISIT: 8/8/2023    HPI:     Connor Emerson is a pleasant 43 year old male who presents to the clinic today for a  six-week post-operative follow-up visit. On 06/27/2023 the patient underwent a right stealth craniotomy and resection of tumor with Dr. Moran. 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   Today, the patient reports that he continues to experience some headaches that are typically in the 5/10 range, today his headache is a 3/10. He has also noted some increased lethargy that he attributes to his chemotherapy.     Current Outpatient Medications   Medication    acetaminophen (TYLENOL) 325 MG tablet    albuterol (PROAIR HFA/PROVENTIL HFA/VENTOLIN HFA) 108 (90 Base) MCG/ACT inhaler    Alcohol Swabs PADS    blood glucose (NO BRAND SPECIFIED) lancets standard    blood glucose (NO BRAND SPECIFIED) test strip    blood glucose monitoring (NO BRAND SPECIFIED) meter device kit    brigatinib (ALUNBRIG) 30 MG TABS tablet    citalopram (CELEXA) 20 MG tablet    Continuous Blood Gluc Sensor (FREESTYLE IRENE 2 SENSOR) MISC    dexamethasone (DECADRON) 1 MG tablet    hydrochlorothiazide (HYDRODIURIL) 25 MG tablet    insulin aspart (NOVOLOG PEN) 100 UNIT/ML pen    insulin glargine (LANTUS PEN) 100 UNIT/ML pen    insulin pen needle (32G X 4 MM) 32G X 4 MM miscellaneous    lidocaine-prilocaine (EMLA) 2.5-2.5 % external cream    lisinopril (ZESTRIL) 40 MG tablet    metFORMIN (GLUCOPHAGE XR) 500 MG 24 hr tablet    metFORMIN (GLUCOPHAGE XR) 500 MG 24 hr tablet    methadone (DOLOPHINE) 5 MG tablet    methocarbamol (ROBAXIN) 750 MG tablet    naloxone (NARCAN) 4 MG/0.1ML nasal spray    oxyCODONE IR (ROXICODONE) 10 MG tablet    propranolol (INDERAL) 20 MG tablet    senna-docusate (SENOKOT-S/PERICOLACE) 8.6-50 MG tablet    Sharps Container MISC    zolpidem (AMBIEN) 5 MG tablet     No current facility-administered medications for this visit.       Allergies    Allergen Reactions    Vicodin [Hydrocodone-Acetaminophen] Nausea and Vomiting and GI Disturbance       Past Medical History:   Diagnosis Date    Atypical chest pain 12/02/2013    Cancer (H)     Complication of anesthesia     Diabetes (H)     GERD (gastroesophageal reflux disease) 12/02/2013    History of pulmonary embolism     HTN (hypertension) 05/14/2012    HTN, goal below 140/90 07/02/2013    Insomnia 02/21/2012    Mediastinal lymphadenopathy     Migraine headache 07/02/2013    Migraine with aura, without mention of intractable migraine without mention of status migrainosus     Pneumonia        Review Of Systems    Skin: negative  Eyes: negative  Ears/Nose/Throat: negative  Respiratory: No shortness of breath, dyspnea on exertion, cough, or hemoptysis  Cardiovascular: negative  Gastrointestinal: negative  Musculoskeletal: negative  Neurologic: headaches  Psychiatric: negative  Hematologic/Lymphatic/Immunologic: negative  Endocrine: negative    OBJECTIVE:    BP (!) 144/98   Pulse 60   SpO2 97%     Imaging:    MRI BRAIN WITHOUT AND WITH CONTRAST 7/27/2023 7:47 AM     1. Right frontal lobe resection cavity demonstrates surrounding  vasogenic edema which is less in extent compared to 6/28/2023.  2. Persistent marginal enhancement along the right frontal resection  cavity which may represent residual tumor or posttreatment change.  3. Multiple other smaller metastases as detailed.  4. No new metastases.    Radiographic Findings: Full radiological report in chart. I personally reviewed the images with the patient today.    Exam:    He appears comfortable and in no apparent distress, moving all extremities.   CN II-XII intact, alert and appropriate with conversation and following commands.   Able to name 3/3 objects.   Finger to nose slow and accurate.   Rapid hand movements intact.   Absent for upper and lower extremity drift.   Bilateral upper and lower extremities 5/5. DTR's wnl. Sensation intact throughout.  Normal ROM.   Incision well healed.     PLAN:    Connor Emerson is six weeks out from a right stealth craniotomy and resection of tumor with Dr. Moran on 06/27/2023. 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 He has also noted some increased lethargy that he attributes to his chemotherapy. Imaging was reviewed today and all questions were answered.    His updated imaging was reviewed with him today. It was explained that the right frontal lobe resection cavity demonstrates surrounding vasogenic edema which is less in extent compared to 6/28/2023. There persistent marginal enhancement along the right frontal resection cavity which may represent residual tumor or posttreatment change. It again, also shows, multiple other smaller metastases but no new metastases.    He is also going to check with his Oncology team for further recommendation.     The patient gave verbal understanding and is in agreement with the above plan. He  will call or return to the clinic for any worsening or changes in symptoms.      Respectfully,     ERICKSON Giron PA-C

## 2023-08-09 RX ORDER — EPINEPHRINE 1 MG/ML
0.3 INJECTION, SOLUTION, CONCENTRATE INTRAVENOUS EVERY 5 MIN PRN
Status: CANCELLED | OUTPATIENT
Start: 2023-08-17

## 2023-08-09 RX ORDER — LORAZEPAM 2 MG/ML
0.5 INJECTION INTRAMUSCULAR EVERY 4 HOURS PRN
Status: CANCELLED | OUTPATIENT
Start: 2023-08-17

## 2023-08-09 RX ORDER — ALBUTEROL SULFATE 90 UG/1
1-2 AEROSOL, METERED RESPIRATORY (INHALATION)
Status: CANCELLED
Start: 2023-08-17

## 2023-08-09 RX ORDER — ALBUTEROL SULFATE 0.83 MG/ML
2.5 SOLUTION RESPIRATORY (INHALATION)
Status: CANCELLED | OUTPATIENT
Start: 2023-08-17

## 2023-08-09 RX ORDER — HEPARIN SODIUM (PORCINE) LOCK FLUSH IV SOLN 100 UNIT/ML 100 UNIT/ML
5 SOLUTION INTRAVENOUS
Status: CANCELLED | OUTPATIENT
Start: 2023-08-17

## 2023-08-09 RX ORDER — MEPERIDINE HYDROCHLORIDE 25 MG/ML
25 INJECTION INTRAMUSCULAR; INTRAVENOUS; SUBCUTANEOUS EVERY 30 MIN PRN
Status: CANCELLED | OUTPATIENT
Start: 2023-08-17

## 2023-08-09 RX ORDER — DIPHENHYDRAMINE HYDROCHLORIDE 50 MG/ML
50 INJECTION INTRAMUSCULAR; INTRAVENOUS
Status: CANCELLED
Start: 2023-08-17

## 2023-08-09 RX ORDER — HEPARIN SODIUM,PORCINE 10 UNIT/ML
5-20 VIAL (ML) INTRAVENOUS DAILY PRN
Status: CANCELLED | OUTPATIENT
Start: 2023-08-17

## 2023-08-10 ENCOUNTER — INFUSION THERAPY VISIT (OUTPATIENT)
Dept: ONCOLOGY | Facility: CLINIC | Age: 43
End: 2023-08-10
Attending: STUDENT IN AN ORGANIZED HEALTH CARE EDUCATION/TRAINING PROGRAM
Payer: COMMERCIAL

## 2023-08-10 ENCOUNTER — APPOINTMENT (OUTPATIENT)
Dept: LAB | Facility: CLINIC | Age: 43
End: 2023-08-10
Attending: STUDENT IN AN ORGANIZED HEALTH CARE EDUCATION/TRAINING PROGRAM
Payer: COMMERCIAL

## 2023-08-10 VITALS
SYSTOLIC BLOOD PRESSURE: 172 MMHG | RESPIRATION RATE: 16 BRPM | OXYGEN SATURATION: 96 % | TEMPERATURE: 98.6 F | WEIGHT: 217.1 LBS | DIASTOLIC BLOOD PRESSURE: 102 MMHG | HEART RATE: 61 BPM | BODY MASS INDEX: 32.06 KG/M2

## 2023-08-10 DIAGNOSIS — I10 PRIMARY HYPERTENSION: ICD-10-CM

## 2023-08-10 DIAGNOSIS — I67.89 RADIATION THERAPY INDUCED BRAIN NECROSIS: ICD-10-CM
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
## 2023-08-10 DIAGNOSIS — C34.31 MALIGNANT NEOPLASM OF LOWER LOBE OF RIGHT LUNG (H): Primary | ICD-10-CM

## 2023-08-10 LAB
ALBUMIN SERPL BCG-MCNC: 4.2 G/DL (ref 3.5–5.2)
ALP SERPL-CCNC: 94 U/L (ref 40–129)
ALT SERPL W P-5'-P-CCNC: 14 U/L (ref 0–70)
AMYLASE SERPL-CCNC: 99 U/L (ref 28–100)
ANION GAP SERPL CALCULATED.3IONS-SCNC: 11 MMOL/L (ref 7–15)
AST SERPL W P-5'-P-CCNC: 15 U/L (ref 0–45)
BILIRUB SERPL-MCNC: 0.3 MG/DL
BUN SERPL-MCNC: 15.9 MG/DL (ref 6–20)
CALCIUM SERPL-MCNC: 9.9 MG/DL (ref 8.6–10)
CHLORIDE SERPL-SCNC: 101 MMOL/L (ref 98–107)
CK SERPL-CCNC: 53 U/L (ref 39–308)
CREAT SERPL-MCNC: 0.6 MG/DL (ref 0.67–1.17)
DEPRECATED HCO3 PLAS-SCNC: 29 MMOL/L (ref 22–29)
FASTING STATUS PATIENT QL REPORTED: NO
GFR SERPL CREATININE-BSD FRML MDRD: >90 ML/MIN/1.73M2
GLUCOSE SERPL-MCNC: 223 MG/DL (ref 70–99)
GLUCOSE SERPL-MCNC: 223 MG/DL (ref 70–99)
LIPASE SERPL-CCNC: 101 U/L (ref 13–60)
MAGNESIUM SERPL-MCNC: 1.6 MG/DL (ref 1.7–2.3)
PHOSPHATE SERPL-MCNC: 3.9 MG/DL (ref 2.5–4.5)
POTASSIUM SERPL-SCNC: 3.3 MMOL/L (ref 3.4–5.3)
PROT SERPL-MCNC: 6.4 G/DL (ref 6.4–8.3)
SODIUM SERPL-SCNC: 141 MMOL/L (ref 136–145)

## 2023-08-10 PROCEDURE — 250N000013 HC RX MED GY IP 250 OP 250 PS 637: Performed by: STUDENT IN AN ORGANIZED HEALTH CARE EDUCATION/TRAINING PROGRAM

## 2023-08-10 PROCEDURE — 83735 ASSAY OF MAGNESIUM: CPT | Performed by: STUDENT IN AN ORGANIZED HEALTH CARE EDUCATION/TRAINING PROGRAM

## 2023-08-10 PROCEDURE — 82947 ASSAY GLUCOSE BLOOD QUANT: CPT

## 2023-08-10 PROCEDURE — 84100 ASSAY OF PHOSPHORUS: CPT

## 2023-08-10 PROCEDURE — 99215 OFFICE O/P EST HI 40 MIN: CPT | Mod: 24 | Performed by: STUDENT IN AN ORGANIZED HEALTH CARE EDUCATION/TRAINING PROGRAM

## 2023-08-10 PROCEDURE — 83690 ASSAY OF LIPASE: CPT

## 2023-08-10 PROCEDURE — 82550 ASSAY OF CK (CPK): CPT

## 2023-08-10 PROCEDURE — 82150 ASSAY OF AMYLASE: CPT

## 2023-08-10 PROCEDURE — 80053 COMPREHEN METABOLIC PANEL: CPT

## 2023-08-10 RX ORDER — HYDROCHLOROTHIAZIDE 50 MG/1
50 TABLET ORAL DAILY
Qty: 30 TABLET | Refills: 1 | Status: SHIPPED | OUTPATIENT
Start: 2023-08-10 | End: 2023-10-26

## 2023-08-10 RX ORDER — LISINOPRIL 40 MG/1
40 TABLET ORAL DAILY
Qty: 90 TABLET | Refills: 1 | Status: SHIPPED | OUTPATIENT
Start: 2023-08-10 | End: 2024-01-25

## 2023-08-10 RX ORDER — MAGNESIUM OXIDE 400 MG/1
400 TABLET ORAL 2 TIMES DAILY
Qty: 4 TABLET | Refills: 0 | Status: SHIPPED | OUTPATIENT
Start: 2023-08-10 | End: 2023-08-12

## 2023-08-10 RX ORDER — POTASSIUM CHLORIDE 1500 MG/1
40 TABLET, EXTENDED RELEASE ORAL ONCE
Status: COMPLETED | OUTPATIENT
Start: 2023-08-10 | End: 2023-08-10

## 2023-08-10 RX ADMIN — POTASSIUM CHLORIDE 40 MEQ: 1500 TABLET, EXTENDED RELEASE ORAL at 11:14

## 2023-08-10 ASSESSMENT — PAIN SCALES - GENERAL: PAINLEVEL: NO PAIN (0)

## 2023-08-10 NOTE — PROGRESS NOTES
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 He is currently undergoing treatment with brigatinib and bevacizumab. Please see previous note by Dr. Persaud for further details on the patient's history. He comes in today for bevacizumab but was asked to be seen due to elevated BP. He is accompanied by his wife.     Interval History:  -Connor feels he is doing ok today. He has been checking his BP at home. His wife thinks it's been running about 160's-170's for the top number. After last visit, he did increase the propanolol to 40mg BID but upon review it seems he did not increase the hydrochlorothiazide to 50mg. He has had ongoing headaches since his last brain surgery. These are overall improving but are still bothersome and and are essentially constant. He follows with palliative for pain control.    -No speech changes, vision changes, paresthesias, focal weakness, leg swelling, or activity concerning for seizures.  -He has felt a bit more tired and forgetful over the last weak.  -No chest pain or SOB.   -No cough or fever.  -Having regular BM;s, last this AM. No abdominal pain.  -No bleeding concerns.  -Some peeling to skin on left hand which is not painful. Reports this happens once per year for 1 week and then goes away. He states its getting better.   -MSK pain better since dose decrease in brigatinib    Review of Systems:  As noted in HPI  Physical Examination:   8/10/2023  9:35 AM   Vital Signs    Systolic 171 !    Diastolic 107 (H)    Pulse 64    Temperature 98.6  F (37  C)    Respirations 16    Weight (LB) 217 lb 1.6 oz    Height    BMI (Calculated)    Pain Score 0 (None)    O2 96 %    Systolic (Patient Reported)    Diastolic (Patient Reported)    Weight (Patient Reported)     Height (Patient Reported)    BMI (Based on Pt Reported Ht/Wt)    General: Well-appearing male in no acute distress.  Eyes: EOMI, PERRL. No scleral icterus or conjunctival injection.  ENT: Oral mucosa is moist without lesions or thrush.   Lymphatic: Neck is supple without cervical or supraclavicular lymphadenopathy.   Cardiovascular: RRR No murmurs. No peripheral edema.  Respiratory: CTA bilaterally. No wheezes or crackles.  Gastrointestinal: BS +. Abdomen rounded, soft, non-tender.   Neurologic: Cranial nerves II through XII are grossly intact. No pronation or drift.  strength and hip flexion 5/5. Sensation intact to light touch to distal extremities. Finger to nose coordinated.   Skin: Peeling to skin left hand without associated erythema. No rashes, petechiae, or bruising noted on exposed skin.   Psych: Alert. Pleasant. Comprehension intact.   Laboratory Data:  Most Recent 3 CBC's:  Recent Labs   Lab Test 08/10/23  0956 07/20/23  1536 06/28/23  0410 06/27/23  1331   WBC 10.1 8.2 17.3* 10.7   HGB 13.9 14.6 14.7 15.9   MCV 88 93 90 89    195 270 294   ANEUTAUTO 6.3 4.7  --  7.6    Most Recent 3 BMP's:  Recent Labs   Lab Test 08/10/23  0952 07/20/23  1536 06/29/23  1233 06/28/23  0813 06/28/23  0410 06/27/23  1331 06/16/23  0841    144  --   --  139  --  142   POTASSIUM 3.3* 4.1  --   --  3.7   < > 3.8   CHLORIDE 101 105  --   --  103  --  104   CO2 29 28  --   --  25  --  27   BUN 15.9 14.8  --   --  15.5  --  11.4   CR 0.60* 0.52*  --   --  0.53*   < > 0.59*   ANIONGAP 11 11  --   --  11  --  11   TORY 9.9 9.2  --   --  9.0  --  9.1   *  223* 256* 231*   < > 187*   < > 331*   PROTTOTAL 6.4 6.3*  --   --   --   --  6.3*   ALBUMIN 4.2 3.9  --   --   --   --  4.1    < > = values in this interval not displayed.    Most Recent 2 LFT's:  Recent Labs   Lab Test 08/10/23  0952 07/20/23  1536   AST 15 21   ALT 14 29   ALKPHOS 94 82   BILITOTAL 0.3 0.5    Most Recent TSH and T4:  Recent Labs   Lab  Test 09/12/22  1642   TSH 2.56   Reviewed lipase, amylase, CK, and magnesium. Lipase elevated some at 101. Mg slightly low.   I reviewed the above labs today.    Imaging:  MR Brain w/o & w Contrast  Narrative: MRI BRAIN WITHOUT AND WITH CONTRAST  7/27/2023 7:47 AM    HISTORY: Brain tumor (H). Status post craniotomy. Headache.     TECHNIQUE:  Multiplanar, multisequence MRI of the brain without and  with 10 mL Gadavist.    COMPARISON: Head MRI 6/28/2023    FINDINGS: Changes of right frontal craniotomy with underlying right  frontal lobe resection cavity. Irregular enhancement is present along  the margins of the resection cavity extending to the frontal horn of  the right lateral ventricle. The extent of enhancement has slightly  increased compared to the prior exam.    The extent of vasogenic edema throughout the right frontal lobe  extending along the deep gray matter and across the attending of the  corpus callosum is overall slightly less in extent compared to the  prior exam. The extent of right to left midline shift has slightly  improved.    Changes of prior left parietal craniotomy with underlying resection  cavity and enhancement, similar compared to the prior, presumably  postoperative.    Multiple other smaller metastases are present. Group of small left  cerebellar metastases are not significantly changed. Group of small  left parietal lobe metastases are not seemingly unchanged. Punctate  right parietal lobe metastasis is not significantly changed. Small  group of metastases involving the left frontal white matter appear  slightly less conspicuous. Small right anterior inferior temporal lobe  metastasis is not significantly changed.    No new metastases are identified. T2 gradient echo imaging  demonstrates areas of susceptibility-related signal loss corresponding  to the lesions and resection cavities, consistent with hemosiderin  deposition. No evidence of acute ischemia, hemorrhage,  or  hydrocephalus.    Marrow signal is otherwise within normal limits. Mild paranasal sinus  mucosal thickening. The visualized tympanic and mastoid cavities are  unremarkable.  Impression: IMPRESSION:    1. Right frontal lobe resection cavity demonstrates surrounding  vasogenic edema which is less in extent compared to 6/28/2023.  2. Persistent marginal enhancement along the right frontal resection  cavity which may represent residual tumor or posttreatment change.  3. Multiple other smaller metastases as detailed.  4. No new metastases.    VIDYA SLAUGHTER MD         SYSTEM ID:  [id number]    I reviewed the above imaging today.    Assessment and Plan:    Connor was seen today for an acute add on visit for assessment of elevated BP in the setting of a planned bevacizumab infusion. I have included his oncology history here (in italics) as previously documented by Dr. Persaud for reference. However, this was not the focus of today's visit.     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 He was holding drug 4/2/22 to 4/11/22 due to MSSA infection from pleurex which is removed. Resumed at 450 mg BID. Due to ongoing myalgias (Although CK is normal), we dose reduced to 300 mg BID.      In Dec 2022, developed Gr 3 arthralgia, and we stopped drug 12/20/22. Had unremitting arthalgias, eventully had to starte PO steroids which led to improvement and resolved. Radiographicaly, he has had a near CR to Rx in the lung, has a residual rt LL lesion.      Began brigatinib 2/22/23 (delayed due to pt hesitancy). Developed severe cough on day 2 so brigatinib was held and cough resolved with in 24-48 hours. He restarted 60 mg daily and upped it to 90 mg.Restging CT showing stable disease in the lung, however, MRI with several contrast  enhancement and increase in size of previously treated lesions. His dose was increased to 180 mg daily to address possible CNS disease progression and started on dexamethasone. Unfortunately missed tox follow up then called in feeling very unwell. He was found to have severe hyperglycemia and was admitted. 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 CT in [place] [address] showing stbale disease. He is back on 180 mg/day of brigatiib but continues to have MSK pain, stiffness and fatigue. Overall  less severe myalgias/arthralgias then with alectinib. We discussed trying to manage with pain medication (Working with palliative) but ultimately we need to reduce the dose. He has agreed to reduce it  to 120 mg (4 tabs).     Of note, myalgias/arthralgias have improved with reduced dosing of brigatinib to 120mg once daily.     # Brain mets:  # Radiation necrosis  -Baseline Brain MRI with several brain mets, s/p GK to 11-12 brain mets. F/u Brain MRI in June 2022 was showing enlargement of the one of the lesions along with edema, therefore had to undergo craniotomy followed by resection, the final biopsy consistent with radiation necrosis.  Had issues with radiation necrosis and swelling requiring steroids but these were driving up blood sugars drastically. Was on bevacizumab for radiation necrosis --15 mg/kg every 3 weeks.  S/p 2 doses of Bevacixumab,hold due HTN urgency and PE. MRI in 4/2023 now showing contrast enhancement of lesions and a dominat lesion int he rt frontal regionw ith edema, several other smaller lesion. Short term MRI showing increase in size of the rt frontal lesion, underwent resection, patho again showing radiation necrosis.   He is off of dexamethasone, discussed that long term dex is not an ideal option and discussed risks and benefits of reintroducing bevacizumab.     -Started Bevacizumab 15mg/kg every 3 weeks on 7/20/2023.  He presents today for Cycle  2.      Oncology Plan  -Hold Bevacizumab today due to elevated BP. Management of BP as detailed below  -Continue Brigatinib 120mg once daily  -Follow up with labs and JULIO in 1 week for follow up on BP and consideration of bevacizumab (infusion requested)  -Continue labs monthly   -CT in Sept followed by Dr. Persaud    Hypertension  -I don't see an indication to administer acute management in clinic as he seems to run high at home and acutely lowering may only cause symptoms and will eventually rebound after medication wears off. Need to focus on improved control overall.   -Hold Bevacizumab today  -Brigatinib can contribute but is already dose reduced so will continue for now  -Continue Lisinopril 40mg once daily (at max dose)  -Continue propanolol 40mg BID (consider increasing if increase in hydrochlorothiazide is inadequate to control BP)  -Increase hydrochlorothiazide to 50mg once daily  -Check BP twice daily. If near 190 systolic or symptomatic, need to seek care. Educated on risk of stroke due to uncontrolled BP and warning signs of this that warrant immediate care.   -Will ask [facility] to check in tomorrow to ensure no worsening or new symptoms    Headaches  -Present since recent craniotomy on 6/27/23. MRI brain on 7/27/23 with reduced vasogenic edema. Clinically feels headaches are slowly improving. Elevated BP could contribute but discomfort of headaches could also elevate BP. Will work on improving BP control as above. -Messaged palliative to check in early next week to assess pain control.     Fatigue  Likely s/t increased propranolol. Continue to monitor    Electrolytes  Just slightly low. Replace magnesium and potassium per protocol today. Recheck labs next week.    Elevated Lipase  -Elevated at 101. Not at grade 3 as it's currently <2xULN. Repeat labs in 1 week; if increases to grade 3, may need to hold Brigatinib until it improves.     Diabetes, Type 2  Reports improved control. Currently running about  150-170 at home  --on Metformin and insulin    I reviewed my plan with Dr. Persaud    60 minutes spent on the date of the encounter doing chart review, review of test results, interpretation of tests, patient visit, and documentation      Amber Scheierl, CNP  [facility] [address] [address]  [phone]

## 2023-08-10 NOTE — LETTER
8/10/2023         RE: Connor Emerson  [address]  [facility] [address]        Dear Colleague,    Thank you for referring your patient, Connor Emerson, to the [facility]. Please see a copy of my visit note below.    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 He is currently undergoing treatment with brigatinib and bevacizumab. Please see previous note by Dr. Persaud for further details on the patient's history. He comes in today for bevacizumab but was asked to be seen due to elevated BP. He is accompanied by his wife.     Interval History:  -Connor feels he is doing ok today. He has been checking his BP at home. His wife thinks it's been running about 160's-170's for the top number. After last visit, he did increase the propanolol to 40mg BID but upon review it seems he did not increase the hydrochlorothiazide to 50mg. He has had ongoing headaches since his last brain surgery. These are overall improving but are still bothersome and and are essentially constant. He follows with palliative for pain control.    -No speech changes, vision changes, paresthesias, focal weakness, leg swelling, or activity concerning for seizures.  -He has felt a bit more tired and forgetful over the last weak.  -No chest pain or SOB.   -No cough or fever.  -Having regular BM;s, last this AM. No abdominal pain.  -No bleeding concerns.  -Some peeling to skin on left hand which is not painful. Reports this happens once per year for 1 week and then goes away. He states its getting better.   -MSK pain better since dose decrease in brigatinib    Review of Systems:  As noted in HPI  Physical Examination:   8/10/2023  9:35 AM   Vital Signs    Systolic 171 !     Diastolic 107 (H)    Pulse 64    Temperature 98.6  F (37  C)    Respirations 16    Weight (LB) 217 lb 1.6 oz    Height    BMI (Calculated)    Pain Score 0 (None)    O2 96 %    Systolic (Patient Reported)    Diastolic (Patient Reported)    Weight (Patient Reported)    Height (Patient Reported)    BMI (Based on Pt Reported Ht/Wt)    General: Well-appearing male in no acute distress.  Eyes: EOMI, PERRL. No scleral icterus or conjunctival injection.  ENT: Oral mucosa is moist without lesions or thrush.   Lymphatic: Neck is supple without cervical or supraclavicular lymphadenopathy.   Cardiovascular: RRR No murmurs. No peripheral edema.  Respiratory: CTA bilaterally. No wheezes or crackles.  Gastrointestinal: BS +. Abdomen rounded, soft, non-tender.   Neurologic: Cranial nerves II through XII are grossly intact. No pronation or drift.  strength and hip flexion 5/5. Sensation intact to light touch to distal extremities. Finger to nose coordinated.   Skin: Peeling to skin left hand without associated erythema. No rashes, petechiae, or bruising noted on exposed skin.   Psych: Alert. Pleasant. Comprehension intact.   Laboratory Data:  Most Recent 3 CBC's:  Recent Labs   Lab Test 08/10/23  0956 07/20/23  1536 06/28/23  0410 06/27/23  1331   WBC 10.1 8.2 17.3* 10.7   HGB 13.9 14.6 14.7 15.9   MCV 88 93 90 89    195 270 294   ANEUTAUTO 6.3 4.7  --  7.6    Most Recent 3 BMP's:  Recent Labs   Lab Test 08/10/23  0952 07/20/23  1536 06/29/23  1233 06/28/23  0813 06/28/23  0410 06/27/23  1331 06/16/23  0841    144  --   --  139  --  142   POTASSIUM 3.3* 4.1  --   --  3.7   < > 3.8   CHLORIDE 101 105  --   --  103  --  104   CO2 29 28  --   --  25  --  27   BUN 15.9 14.8  --   --  15.5  --  11.4   CR 0.60* 0.52*  --   --  0.53*   < > 0.59*   ANIONGAP 11 11  --   --  11  --  11   TORY 9.9 9.2  --   --  9.0  --  9.1   *  223* 256* 231*   < > 187*   < > 331*   PROTTOTAL 6.4 6.3*  --   --   --   --  6.3*    ALBUMIN 4.2 3.9  --   --   --   --  4.1    < > = values in this interval not displayed.    Most Recent 2 LFT's:  Recent Labs   Lab Test 08/10/23  0952 07/20/23  1536   AST 15 21   ALT 14 29   ALKPHOS 94 82   BILITOTAL 0.3 0.5    Most Recent TSH and T4:  Recent Labs   Lab Test 09/12/22  1642   TSH 2.56   Reviewed lipase, amylase, CK, and magnesium. Lipase elevated some at 101. Mg slightly low.   I reviewed the above labs today.    Imaging:  MR Brain w/o & w Contrast  Narrative: MRI BRAIN WITHOUT AND WITH CONTRAST  7/27/2023 7:47 AM    HISTORY: Brain tumor (H). Status post craniotomy. Headache.     TECHNIQUE:  Multiplanar, multisequence MRI of the brain without and  with 10 mL Gadavist.    COMPARISON: Head MRI 6/28/2023    FINDINGS: Changes of right frontal craniotomy with underlying right  frontal lobe resection cavity. Irregular enhancement is present along  the margins of the resection cavity extending to the frontal horn of  the right lateral ventricle. The extent of enhancement has slightly  increased compared to the prior exam.    The extent of vasogenic edema throughout the right frontal lobe  extending along the deep gray matter and across the attending of the  corpus callosum is overall slightly less in extent compared to the  prior exam. The extent of right to left midline shift has slightly  improved.    Changes of prior left parietal craniotomy with underlying resection  cavity and enhancement, similar compared to the prior, presumably  postoperative.    Multiple other smaller metastases are present. Group of small left  cerebellar metastases are not significantly changed. Group of small  left parietal lobe metastases are not seemingly unchanged. Punctate  right parietal lobe metastasis is not significantly changed. Small  group of metastases involving the left frontal white matter appear  slightly less conspicuous. Small right anterior inferior temporal lobe  metastasis is not significantly  changed.    No new metastases are identified. T2 gradient echo imaging  demonstrates areas of susceptibility-related signal loss corresponding  to the lesions and resection cavities, consistent with hemosiderin  deposition. No evidence of acute ischemia, hemorrhage, or  hydrocephalus.    Marrow signal is otherwise within normal limits. Mild paranasal sinus  mucosal thickening. The visualized tympanic and mastoid cavities are  unremarkable.  Impression: IMPRESSION:    1. Right frontal lobe resection cavity demonstrates surrounding  vasogenic edema which is less in extent compared to 6/28/2023.  2. Persistent marginal enhancement along the right frontal resection  cavity which may represent residual tumor or posttreatment change.  3. Multiple other smaller metastases as detailed.  4. No new metastases.    VIDYA SLAUGHTER MD         SYSTEM ID:  [id number]    I reviewed the above imaging today.    Assessment and Plan:    Connor was seen today for an acute add on visit for assessment of elevated BP in the setting of a planned bevacizumab infusion. I have included his oncology history here (in italics) as previously documented by Dr. Persaud for reference. However, this was not the focus of today's visit.     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 He was holding drug 4/2/22 to 4/11/22 due to MSSA infection from pleurex which is removed. Resumed at 450 mg BID. Due to ongoing myalgias (Although CK is normal), we dose reduced to 300 mg BID.      In Dec 2022, developed Gr 3 arthralgia, and we stopped drug 12/20/22. Had unremitting arthalgias, eventully had to starte PO steroids which led to improvement and resolved. Radiographicaly, he has had a near CR to Rx in the lung, has a residual rt LL lesion.      Began  brigatinib 2/22/23 (delayed due to pt hesitancy). Developed severe cough on day 2 so brigatinib was held and cough resolved with in 24-48 hours. He restarted 60 mg daily and upped it to 90 mg.Restging CT showing stable disease in the lung, however, MRI with several contrast enhancement and increase in size of previously treated lesions. His dose was increased to 180 mg daily to address possible CNS disease progression and started on dexamethasone. Unfortunately missed tox follow up then called in feeling very unwell. He was found to have severe hyperglycemia and was admitted. 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 CT in [place] [address] showing stbale disease. He is back on 180 mg/day of brigatiib but continues to have MSK pain, stiffness and fatigue. Overall  less severe myalgias/arthralgias then with alectinib. We discussed trying to manage with pain medication (Working with palliative) but ultimately we need to reduce the dose. He has agreed to reduce it  to 120 mg (4 tabs).     Of note, myalgias/arthralgias have improved with reduced dosing of brigatinib to 120mg once daily.     # Brain mets:  # Radiation necrosis  -Baseline Brain MRI with several brain mets, s/p GK to 11-12 brain mets. F/u Brain MRI in June 2022 was showing enlargement of the one of the lesions along with edema, therefore had to undergo craniotomy followed by resection, the final biopsy consistent with radiation necrosis.  Had issues with radiation necrosis and swelling requiring steroids but these were driving up blood sugars drastically. Was on bevacizumab for radiation necrosis --15 mg/kg every 3 weeks.  S/p 2 doses of Bevacixumab,hold due HTN urgency and PE. MRI in 4/2023 now showing contrast enhancement of lesions and a dominat lesion int he rt frontal regionw ith edema, several other smaller lesion. Short term MRI showing increase in size of the rt frontal lesion, underwent resection,  patho again showing radiation necrosis.   He is off of dexamethasone, discussed that long term dex is not an ideal option and discussed risks and benefits of reintroducing bevacizumab.     -Started Bevacizumab 15mg/kg every 3 weeks on 7/20/2023.  He presents today for Cycle 2.      Oncology Plan  -Hold Bevacizumab today due to elevated BP. Management of BP as detailed below  -Continue Brigatinib 120mg once daily  -Follow up with labs and JULIO in 1 week for follow up on BP and consideration of bevacizumab (infusion requested)  -Continue labs monthly   -CT in Sept followed by Dr. Persaud    Hypertension  -I don't see an indication to administer acute management in clinic as he seems to run high at home and acutely lowering may only cause symptoms and will eventually rebound after medication wears off. Need to focus on improved control overall.   -Hold Bevacizumab today  -Brigatinib can contribute but is already dose reduced so will continue for now  -Continue Lisinopril 40mg once daily (at max dose)  -Continue propanolol 40mg BID (consider increasing if increase in hydrochlorothiazide is inadequate to control BP)  -Increase hydrochlorothiazide to 50mg once daily  -Check BP twice daily. If near 190 systolic or symptomatic, need to seek care. Educated on risk of stroke due to uncontrolled BP and warning signs of this that warrant immediate care.   -Will ask RNCC to check in tomorrow to ensure no worsening or new symptoms    Headaches  -Present since recent craniotomy on 6/27/23. MRI brain on 7/27/23 with reduced vasogenic edema. Clinically feels headaches are slowly improving. Elevated BP could contribute but discomfort of headaches could also elevate BP. Will work on improving BP control as above. -Messaged palliative to check in early next week to assess pain control.     Fatigue  Likely s/t increased propranolol. Continue to monitor    Electrolytes  Just slightly low. Replace magnesium and potassium per protocol  today. Recheck labs next week.    Elevated Lipase  -Elevated at 101. Not at grade 3 as it's currently <2xULN. Repeat labs in 1 week; if increases to grade 3, may need to hold Brigatinib until it improves.     Diabetes, Type 2  Reports improved control. Currently running about 150-170 at home  --on Metformin and insulin    I reviewed my plan with Dr. Persaud    60 minutes spent on the date of the encounter doing chart review, review of test results, interpretation of tests, patient visit, and documentation      Amber Scheierl, CNP  [facility] [address] [address]  [phone]

## 2023-08-10 NOTE — PROGRESS NOTES
See lab nurses note about communication to Dr. Persaud to have JULIO see patient before proceeding with infusion given high blood pressure.    Infusion Nursing Note:  Connor Emerson presents today for Day 1 Cycle 2 Avastin (deferred 1 week for hypertension)   Patient seen by provider today: Yes: Amber Scheierl PA   present during visit today: Not Applicable.    Note: Patient presents to infusion feeling ok. Patient denies acute discomfort and states no acute complaints or concerns needing to be addressed today. Specifically, pt denies s/s of infection such as fever, sore throat, cough, chest pain, shortness of breath, body aches, chills, headache, increased nasal congestion, or changes in taste/smell. Bp elevated within 170's systolic in lab. Pt confirms he is taking Propanolol 40mg twice a day as recommended on July 20th. Pt and wife state blood pressure at home have been around 160's systolic.     VORB 8/10/2023.1059. Amber Scheierl PA. Josse Calderon RN. Defer Avastin for 1 week. Will schedule lab, JULIO follow-up, infusion in 1 week. Replace potassium and Magnesium per protocol. Educate pt to go to ER if home blood pressure is 190 systolic or above.     Pt verbalizes understanding of the orders above. Education on Potassium and Magnesium supplements provided.     Intravenous Access:  Peripheral IV placed.    Treatment Conditions:  Lab Results   Component Value Date    HGB 13.9 08/10/2023    WBC 10.1 08/10/2023    ANEU 7.8 08/20/2016    ANEUTAUTO 6.3 08/10/2023     08/10/2023        Lab Results   Component Value Date     08/10/2023    POTASSIUM 3.3 (L) 08/10/2023    MAG 1.6 (L) 08/10/2023    CR 0.60 (L) 08/10/2023    TORY 9.9 08/10/2023    BILITOTAL 0.3 08/10/2023    ALBUMIN 4.2 08/10/2023    ALT 14 08/10/2023    AST 15 08/10/2023     Results reviewed, labs did NOT meet treatment parameters: BP greater then 160/100.      Post Infusion Assessment:  Access discontinued per protocol.        Discharge Plan:   Prescription refills given for Lisinopril, Hydrochlorathiazide, Magnesium.  Discharge instructions reviewed with: Patient.  Patient and/or family verbalized understanding of discharge instructions and all questions answered.  Copy of AVS reviewed with patient and/or family.  Patient will return in 1 week for next appointment.  Patient discharged in stable condition accompanied by: wife.  Departure Mode: Ambulatory.      Josse Calderon RN

## 2023-08-10 NOTE — PATIENT INSTRUCTIONS
Go to ER if systolic (top number of blood pressure reading) is greater then 190.     [facility] Triage and after hours / weekends / holidays:  [phone]    Please call the triage or after hours line if you experience a temperature greater than or equal to 100.4, shaking chills, have uncontrolled nausea, vomiting and/or diarrhea, dizziness, shortness of breath, chest pain, bleeding, unexplained bruising, or if you have any other new/concerning symptoms, questions or concerns.      If you are having any concerning symptoms or wish to speak to a provider before your next infusion visit, please call triage to notify them so we can adequately serve you.     If you need a refill on a narcotic prescription or other medication, please call before your infusion appointment.                 August 2023 Sunday Monday Tuesday Wednesday Thursday Friday Saturday             1     2     3     4    RETURN CCSL   9:45 AM   (45 min.)   Chelsea Villegas CNP   [facility] 5       6     7     8    RETURN   1:30 PM   (30 min.)   Alex Oliveros PA-C   [facility] [place] 9     10    LAB CENTRAL   9:15 AM   (15 min.)   [facility] LAB DRAW   [facility]    RETURN CCSL   9:45 AM   (45 min.)   Scheierl, Amber J, APRN CNP   [facility]    ONC INFUSION 1 HR (60 MIN)  10:00 AM   (60 min.)    ONC INFUSION NURSE   [facility] 11     12       13     14    RETURN RADIATION ONCOLOGY  11:05 AM   (40 min.)   Ajith Brewer MD   [facility] [place] 15     16    LAB   8:45 AM   (15 min.)    LAB ONLY   [facility] 17     18     19       20     21    RETURN CCSL   1:00 PM   (60 min.)   Lanie Crump LICSW   [facility] 22     23     24     25     26       27     28    LAB PERIPHERAL   9:30 AM   (15  min.)   [facility] LAB DRAW   [facility]    ONC INFUSION 1 HR (60 MIN)  10:00 AM   (60 min.)    ONC INFUSION NURSE   [facility] 29 30 31 September 2023 Sunday Monday Tuesday Wednesday Thursday Friday Saturday                            1     2       3     4     5     6     7     8     9       10     11     12    CT CHEST/ABDOMEN/PELVIS W   2:30 PM   (20 min.)   [id number]   [facility] 13    RETURN CCSL   3:15 PM   (30 min.)   Bassam Persaud MD   [facility] 14     15     16       17     18     19     20     21     22     23       24     25     26     27    RETURN   1:30 PM   (30 min.)   Alex Oliveros PA-C   [facility] [place] 28 29     30                     Lab Results:  Recent Results (from the past 12 hour(s))   Magnesium    Collection Time: 08/10/23  9:52 AM   Result Value Ref Range    Magnesium 1.6 (L) 1.7 - 2.3 mg/dL   CK total    Collection Time: 08/10/23  9:52 AM   Result Value Ref Range    CK 53 39 - 308 U/L   Amylase    Collection Time: 08/10/23  9:52 AM   Result Value Ref Range    Amylase 99 28 - 100 U/L   Comprehensive metabolic panel (BMP + Alb, Alk Phos, ALT, AST, Total. Bili, TP)    Collection Time: 08/10/23  9:52 AM   Result Value Ref Range    Sodium 141 136 - 145 mmol/L    Potassium 3.3 (L) 3.4 - 5.3 mmol/L    Chloride 101 98 - 107 mmol/L    Carbon Dioxide (CO2) 29 22 - 29 mmol/L    Anion Gap 11 7 - 15 mmol/L    Urea Nitrogen 15.9 6.0 - 20.0 mg/dL    Creatinine 0.60 (L) 0.67 - 1.17 mg/dL    Calcium 9.9 8.6 - 10.0 mg/dL    Glucose 223 (H) 70 - 99 mg/dL    Alkaline Phosphatase 94 40 - 129 U/L    AST 15 0 - 45 U/L    ALT 14 0 - 70 U/L    Protein Total 6.4 6.4 - 8.3 g/dL    Albumin 4.2 3.5 - 5.2 g/dL    Bilirubin Total 0.3 <=1.2 mg/dL    GFR Estimate >90 >60 mL/min/1.73m2   Phosphorus    Collection  Time: 08/10/23  9:52 AM   Result Value Ref Range    Phosphorus 3.9 2.5 - 4.5 mg/dL   Glucose    Collection Time: 08/10/23  9:52 AM   Result Value Ref Range    Glucose 223 (H) 70 - 99 mg/dL    Patient Fasting > 8hrs? No    Lipase    Collection Time: 08/10/23  9:52 AM   Result Value Ref Range    Lipase 101 (H) 13 - 60 U/L   CBC with platelets and differential    Collection Time: 08/10/23  9:56 AM   Result Value Ref Range    WBC Count 10.1 4.0 - 11.0 10e3/uL    RBC Count 4.49 4.40 - 5.90 10e6/uL    Hemoglobin 13.9 13.3 - 17.7 g/dL    Hematocrit 39.5 (L) 40.0 - 53.0 %    MCV 88 78 - 100 fL    MCH 31.0 26.5 - 33.0 pg    MCHC 35.2 31.5 - 36.5 g/dL    RDW 12.8 10.0 - 15.0 %    Platelet Count 271 150 - 450 10e3/uL    % Neutrophils 63 %    % Lymphocytes 29 %    % Monocytes 6 %    % Eosinophils 2 %    % Basophils 0 %    % Immature Granulocytes 0 %    NRBCs per 100 WBC 0 <1 /100    Absolute Neutrophils 6.3 1.6 - 8.3 10e3/uL    Absolute Lymphocytes 2.9 0.8 - 5.3 10e3/uL    Absolute Monocytes 0.6 0.0 - 1.3 10e3/uL    Absolute Eosinophils 0.2 0.0 - 0.7 10e3/uL    Absolute Basophils 0.0 0.0 - 0.2 10e3/uL    Absolute Immature Granulocytes 0.0 <=0.4 10e3/uL    Absolute NRBCs 0.0 10e3/uL     Patient Education   Acute High Blood Pressure: Care Instructions  Overview     Acute high blood pressure is very high blood pressure. It's a serious problem. A person's blood pressure may be 180/120 or higher. Very high blood pressure can damage your brain, heart, eyes, and kidneys.  You may have been given medicines to lower your blood pressure. You may have gotten them as pills or through a needle in one of your veins. This is called an I.V. And maybe you were given other medicines too. These can be needed when high blood pressure causes other problems.  To keep your blood pressure at a lower level, you may need to make healthy lifestyle changes. And you will probably need to take medicines.  Be sure to follow up with your doctor about your  blood pressure and what you can do about it.  Follow-up care is a key part of your treatment and safety. Be sure to make and go to all appointments, and call your doctor if you are having problems. It's also a good idea to know your test results and keep a list of the medicines you take.  How can you care for yourself at home?  See your doctor as often as he or she recommends. This is to make sure your blood pressure is under control.  Take your blood pressure medicine exactly as prescribed. You may take one or more types. They include diuretics, beta-blockers, ACE inhibitors, calcium channel blockers, and angiotensin II receptor blockers. Call your doctor if you think you are having a problem with your medicine.  If you take blood pressure medicine, talk to your doctor before you take decongestants or anti-inflammatory medicine, such as ibuprofen. These can raise blood pressure.  Learn how to check your blood pressure at home. Check it often.  Ask your doctor if it's okay to drink alcohol.  Talk to your doctor about lifestyle changes that can help blood pressure. These include being active and managing your weight.  Don't smoke. Smoking increases your risk for heart attack and stroke.  When should you call for help?   Call 911  anytime you think you may need emergency care. This may mean having symptoms that suggest that your blood pressure is causing a serious heart or blood vessel problem. Your blood pressure may be over 180/120.  For example, call 911 if:    You have symptoms of a heart attack. These may include:  Chest pain or pressure, or a strange feeling in the chest.  Sweating.  Shortness of breath.  Nausea or vomiting.  Pain, pressure, or a strange feeling in the back, neck, jaw, or upper belly or in one or both shoulders or arms.  Lightheadedness or sudden weakness.  A fast or irregular heartbeat.     You have symptoms of a stroke. These may include:  Sudden numbness, tingling, weakness, or loss of  "movement in your face, arm, or leg, especially on only one side of your body.  Sudden vision changes.  Sudden trouble speaking.  Sudden confusion or trouble understanding simple statements.  Sudden problems with walking or balance.  A sudden, severe headache that is different from past headaches.     You have severe back or belly pain.   Do not wait until your blood pressure comes down on its own. Get help right away.  Call your doctor now or seek immediate care if:    Your blood pressure is much higher than normal (such as 180/120 or higher), but you don't have symptoms.     You think high blood pressure is causing symptoms, such as:  Severe headache.  Blurry vision.   Watch closely for changes in your health, and be sure to contact your doctor if:    Your blood pressure measures higher than your doctor recommends at least 2 times. That means the top number is higher or the bottom number is higher, or both.     You think you may be having side effects from your blood pressure medicine.   Where can you learn more?  Go to https://www.[employer].net/patiented  Enter H919 in the search box to learn more about \"Acute High Blood Pressure: Care Instructions.\"  Current as of: September 7, 2022               Content Version: 13.7    9205-6060 [employer].   Care instructions adapted under license by your healthcare professional. If you have questions about a medical condition or this instruction, always ask your healthcare professional. [employer] disclaims any warranty or liability for your use of this information.           "

## 2023-08-10 NOTE — NURSING NOTE
Chief Complaint   Patient presents with    Blood Draw     Labs drawn by RN in lab by . VS taken.     Labs drawn from PIV placed by RN. Line flushed with saline. Was unable to provide urine sample, labeled specimen cup sent with patient. Vitals taken. BP elevated on initial assessment and recheck. Infusion and MD notified. Per MD, provider will needs to see pt prior to infusion. Pt is asymptomatic and stable, sent back to lobby. Pt checked in for appointment(s).  Nabor Sandoval RN

## 2023-08-14 ENCOUNTER — VIRTUAL VISIT (OUTPATIENT)
Dept: RADIATION ONCOLOGY | Facility: HOSPITAL | Age: 43
End: 2023-08-14
Attending: FAMILY MEDICINE
Payer: COMMERCIAL

## 2023-08-14 ENCOUNTER — PATIENT OUTREACH (OUTPATIENT)
Dept: ONCOLOGY | Facility: CLINIC | Age: 43
End: 2023-08-14

## 2023-08-14 VITALS — BODY MASS INDEX: 32.14 KG/M2 | HEIGHT: 69 IN | WEIGHT: 217 LBS

## 2023-08-14 DIAGNOSIS — E11.65 TYPE 2 DIABETES MELLITUS WITH HYPERGLYCEMIA, WITHOUT LONG-TERM CURRENT USE OF INSULIN (H): ICD-10-CM

## 2023-08-14 DIAGNOSIS — M79.10 MYALGIA: ICD-10-CM

## 2023-08-14 DIAGNOSIS — D49.6 BRAIN TUMOR (H): ICD-10-CM

## 2023-08-14 DIAGNOSIS — Z51.5 PALLIATIVE CARE PATIENT: Primary | ICD-10-CM

## 2023-08-14 DIAGNOSIS — Z98.890 S/P CRANIOTOMY: ICD-10-CM

## 2023-08-14 PROCEDURE — 99215 OFFICE O/P EST HI 40 MIN: CPT | Mod: 24 | Performed by: FAMILY MEDICINE

## 2023-08-14 RX ORDER — OXYCODONE HYDROCHLORIDE 10 MG/1
10 TABLET ORAL
Qty: 90 TABLET | Refills: 0 | Status: SHIPPED | OUTPATIENT
Start: 2023-08-14 | End: 2023-08-27

## 2023-08-14 RX ORDER — CYCLOBENZAPRINE HCL 5 MG
5 TABLET ORAL
Qty: 30 TABLET | Refills: 4 | Status: SHIPPED | OUTPATIENT
Start: 2023-08-14 | End: 2023-11-17

## 2023-08-14 ASSESSMENT — PAIN SCALES - GENERAL: PAINLEVEL: NO PAIN (0)

## 2023-08-14 NOTE — NURSING NOTE
Is the patient currently in the state of [place]? YES    Visit mode:VIDEO    If the visit is dropped, the patient can be reconnected by: VIDEO VISIT: Text to cell phone: [phone]    Will anyone else be joining the visit? NO      How would you like to obtain your AVS? MyChart    Are changes needed to the allergy or medication list? NO    Pt declined medication review.    Reason for visit: RADU JONAS, VF

## 2023-08-14 NOTE — PATIENT INSTRUCTIONS
It was good to see you today, Connor.  All in all, you look pretty good.    Here are the things we talked about:  Let's try adding flexeril in the evening or bedtime to see if it helps with your morning stiffness.  I sent in a prescription to the [facility] in [place].    I also sent in a refill of oxycodone.      Please get an ECG done to monitor if methadone is affecting your heart rhythm. You cold do this at any clinic in our system.      Someone from the team will reach out to schedule a follow up appointment in 4 weeks. And I can see you sooner, if needed.  Reyna will call next week to see if the flexeril is helping.     How to get a hold of us:  For non-urgent matters, MyChart works best.    For more urgent matters, or if you prefer not to use MyChart, call our clinic nurse coordinator Reyna Ma RN at [phone]    We have an on-call number for evenings and weekends. Please call this only if you are having uncontrolled symptoms or serious side effects from your medicines: [phone].     For refills, please give us a week (5 working days) notice. We don't always have providers available everyday to do refills. If you call the day you run out of your medicine, we may not be able to refill it in time, so call 5 days in advance!    Ajith Brewer MD MS FAAFP CAQHPM  [facility]  Office [phone]  Fax [phone]    77 yo M with PMH BPH, GERD. DM, COPD, CAD, seizures, brain hemangioma s/p laser treatment in 2008, and myasthenia gravis presents to the ED with diarrhea and global joint/muscle pain    VS noted  US abd noted -     RHEUM consult noted - vs noted - recs reviewed -   low normal Sat -   enc use of I amanda  COPD rx regimen on order    copd - emphysema - proventil PRN - symbicort - seasonal vaccination - keep sat > 88 pct    Pulm nodules - sub cm - will need follow up as outpatient - repeat CT chest in 6 months     MG - on mestinon - follows with Neuro - on Prednisone - monitor for weakness -     Salmonella Sepsis - bacteremia - on Rocephin - ID following - cx noted    CAD - [facility]vs rx regimen - BP control - echo done - report pending    GERD - PPI    Join aches - myalgia and arthralgia - may be related to Salmonella Sepsis    Depression - emotional support    BMI > 30 - may need RULA eval as outpatien

## 2023-08-14 NOTE — PROGRESS NOTES
Virtual Visit Details    Type of service:  Video Visit   Video Start Time: 11:37 AM  Video End Time:1201    Originating Location (pt. Location): Other at work    Distant Location (provider location):  On-site  Platform used for Video Visit: [facility]    Palliative Care Outpatient Clinic Progress Note    Patient Name: Connor Emerson  Primary Provider: Bassam Persaud    Impression & Recommendations & Counseling:  Connor Emerson is a 42 year old male with history of NSCLC with ALK rearrangement and mets to the brain s/p gamma knife treatments and craniotomy open excision and currently on a second line TKI. He is experiencing 'stiffness' from the TKI.  ECO  Decisional Capacity: very present     PDMP review:  Yes, no concerns     Metastatic NSCLC with ALK rearrangement  S/p GK to brain mets and craniotomy for excision  Cancer associated pain on Methadone 5 mg o BID and using prn oxycodone 10 mg about TID  TKI associated muscle stiffness  HTN  Headaches--overall better and Connor prefers to minimize use of steroids, if at all possible.       Goals of Care:  Connor wants to continue cancer-directed therapies as long as the side effects are tolerable.  He is a FULL code should he have a cardiac arrest. He is OK being cared for in the acute care hospital if needed.     Recommendations & Counseling:  Continue cancer care per Dr. Persaud and his team  Continue Methadone  5 mg po BID; he has not felt over sedated though he does have some fatigue  Continue oxycodone 5-10 mg po q 4 hours prn;  Continue to hold Ambien   Narcan nasal spray also sent to pharmacy      Start flexeril 5 mg at HS to see if it helps morning stiffness; [facility] will call in a week for symptom check.  [facility] and [facility] pharmacy resources did not indicate a drug-drug interaction with methadone.  Check ECG in 4 weeks to monitor QTc while titrating methadone  F/up in 4 weeks and sooner prn.         Counseling: All of the above was explained to the patient in lay  language. The patient has verbalized a clear understanding of the discussion, asked appropriate questions, which have been answered to patient's apparent satisfaction. The patient is in agreement with the above plan.        Chief Complaint/Patient ID: Connor Emerson 42 year old male with PMHx of  NSCLC with ALK rearrangement and mets to the brain s/p gamma knife treatments and craniotomy open excision and currently on a second line TKI. He is experiencing 'stiffness' from the TKI.       Last Palliative care appointment: 06/06/2023 with me     Reviewed: yes, no concerns    Interim History:  Connor Emerson is a 43 year old male who is seen today for follow up with Palliative Care via billable video visit.  He had a lot of nausea with his last run of chemo and I asked him to let the cancer team know that.   He thinks the increased methadone is helping his overall pain and he is only using #3-10 mg oxycodone for BTP at this point. His stiffness is returning especially in the morning. He also had some elevated BP's.  His headaches are improving.  He thinks they are post op changes related to his stealthy craniotomy at the end of June which showed radiation necrosis and no recurrent cancer o r new mets.  He'd like to avoid steroids if at all possible due to weight gain and appetite stimulation     Pain:  overall good control with methadone and oxycodone    Appetite/Nausea: OK     Bowels: no concerns      Sleep: off Ambien, he finds the evening methadone dose helps his sleep     Mood: overall good; he still worries about the impact his cancer is having on his wife and children    Stiffness from ALK blockers:       Coping:  good; he finds his visits with Maame Montoya to be helpful and wondered about having his wife join him.  I told him to ask Maame and I know she does see some couples.    Family History- Reviewed in Epic.    Allergies   Allergen Reactions    Vicodin [Hydrocodone-Acetaminophen] Nausea and Vomiting and GI  Disturbance       Social History:  Pertinent changes to social history/social situation since last visit: none  Key support resources: his wife and family  Advance Directive Status:  no ACP documents in Epic    Social History     Tobacco Use    Smoking status: Never     Passive exposure: Never    Smokeless tobacco: Never   Vaping Use    Vaping Use: Never used   Substance Use Topics    Alcohol use: Yes     Alcohol/week: 0.0 standard drinks of alcohol     Comment: social    Drug use: No         Allergies   Allergen Reactions    Vicodin [Hydrocodone-Acetaminophen] Nausea and Vomiting and GI Disturbance     Current Outpatient Medications   Medication Sig Dispense Refill    acetaminophen (TYLENOL) 325 MG tablet Take 3 tablets (975 mg) by mouth every 8 hours 40 tablet 0    albuterol (PROAIR HFA/PROVENTIL HFA/VENTOLIN HFA) 108 (90 Base) MCG/ACT inhaler Inhale 2 puffs into the lungs every 6 hours as needed for shortness of breath, wheezing or cough (Patient not taking: Reported on 7/20/2023) 18 g 0    Alcohol Swabs PADS Use to swab the area of the injection or jabier as directed. Per insurance coverage 100 each 0    blood glucose (NO BRAND SPECIFIED) lancets standard To use to test glucose level in the blood Use to test blood sugar  4  times daily as directed. To accompany glucose monitor brands per insurance coverage. 100 each 3    blood glucose (NO BRAND SPECIFIED) test strip To use to test glucose level in the blood Use to test blood sugar  4 times daily as directed. To accompany glucose monitor brands per insurance coverage. 100 strip 3    blood glucose monitoring (NO BRAND SPECIFIED) meter device kit Use as directed. Per insurance coverage 1 kit 0    brigatinib (ALUNBRIG) 30 MG TABS tablet Take 4 tablets (120 mg) by mouth daily May be taken with or without food. Swallow whole. Do not crush or chew tablets. 120 tablet 0    citalopram (CELEXA) 20 MG tablet Take 1 tablet (20 mg) by mouth every evening 90 tablet 1     Continuous Blood Gluc Sensor (FREESTYLE IRENE 2 SENSOR) [facility] 1 each every 14 days Apply as directed per  instructions 2 each 11    dexamethasone (DECADRON) 1 MG tablet Take 1 tablet (1 mg) by mouth daily for 3 days 3 tablet 0    hydrochlorothiazide (HYDRODIURIL) 50 MG tablet Take 1 tablet (50 mg) by mouth daily 30 tablet 1    insulin aspart (NOVOLOG PEN) 100 UNIT/ML pen Inject 0-40 Units Subcutaneous 3 times daily (before meals) Per discharge instructions sliding scale 45 mL 2    insulin glargine (LANTUS PEN) 100 UNIT/ML pen Inject 70 Units Subcutaneous At Bedtime 30 mL 2    insulin pen needle (32G X 4 MM) 32G X 4 MM miscellaneous Use as directed by provider. Per insurance coverage 100 each 0    lidocaine-prilocaine (EMLA) 2.5-2.5 % external cream Apply topically as needed (pain due to port access) Apply to port 30 minutes prior to lab appointment. 30 g 6    lisinopril (ZESTRIL) 40 MG tablet Take 1 tablet (40 mg) by mouth daily 90 tablet 1    metFORMIN (GLUCOPHAGE XR) 500 MG 24 hr tablet Take 2 tablets (1,000 mg) by mouth daily (with dinner) 180 tablet 0    metFORMIN (GLUCOPHAGE XR) 500 MG 24 hr tablet Take 1 tablet (500 mg) by mouth daily (with breakfast) 270 tablet 1    methadone (DOLOPHINE) 5 MG tablet Take 0.5 tablets (2.5 mg) by mouth every morning AND 1 tablet (5 mg) At Bedtime. 45 tablet 0    methocarbamol (ROBAXIN) 750 MG tablet Take 1 tablet (750 mg) by mouth every 6 hours as needed for muscle spasms 40 tablet 0    naloxone (NARCAN) 4 MG/0.1ML nasal spray Spray 1 spray (4 mg) into one nostril alternating nostrils as needed for opioid reversal every 2-3 minutes until assistance arrives 0.2 mL 3    oxyCODONE IR (ROXICODONE) 10 MG tablet Take 1 tablet (10 mg) by mouth every 3 hours as needed for moderate pain 60 tablet 0    propranolol (INDERAL) 20 MG tablet Take 1 tablet (20 mg) by mouth 2 times daily 180 tablet 1    senna-docusate (SENOKOT-S/PERICOLACE) 8.6-50 MG tablet Take 1 tablet by mouth 2  times daily as needed for constipation (take while on oxycodone) 40 tablet 0    Sharps Container MISC Use as directed to dispose of needles, lancets and other sharps 1 each 0    zolpidem (AMBIEN) 5 MG tablet Take 1 tablet (5 mg) by mouth nightly as needed for sleep 20 tablet 0     Past Medical History:   Diagnosis Date    Atypical chest pain 12/02/2013    Cancer (H)     Complication of anesthesia     Diabetes (H)     GERD (gastroesophageal reflux disease) 12/02/2013    History of pulmonary embolism     HTN (hypertension) 05/14/2012    HTN, goal below 140/90 07/02/2013    Insomnia 02/21/2012    Mediastinal lymphadenopathy     Migraine headache 07/02/2013    Migraine with aura, without mention of intractable migraine without mention of status migrainosus     Pneumonia      Past Surgical History:   Procedure Laterality Date    BRONCHOSCOPY RIGID OR FLEXIBLE W/TRANSENDOSCOPIC ENDOBRONCHIAL ULTRASOUND GUIDED Bilateral 1/26/2022    Procedure: Right BRONCHOSCOPY, FIBEROPTIC, endobronchial ultrasound, pleural biopsy;  Surgeon: Dallin Agrawal MD;  Location: [facility] OR    INJECT BLOCK MEDIAL BRANCH CERVICAL/THORACIC/LUMBAR      INSERT CHEST TUBE Right 2/16/2022    Procedure: INSERTION, CATHETER, INTERCOSTAL, FOR DRAINAGE;  Surgeon: Dallin Agrawal MD;  Location: [facility] GI    INSERT CHEST TUBE Right 3/9/2022    Procedure: INSERTION, CATHETER, INTERCOSTAL, FOR DRAINAGE;  Surgeon: Sushila Antonio MD;  Location: [facility] GI    IR CHEST TUBE REMOVAL TUNNELED RIGHT  4/2/2022    OPTICAL TRACKING SYSTEM CRANIOTOMY, EXCISE TUMOR, COMBINED Left 6/28/2022    Procedure: Left stealth craniotomy for tumor resection with motor mapping;  Surgeon: Stephen Moran MD;  Location:  OR    OPTICAL TRACKING SYSTEM CRANIOTOMY, EXCISE TUMOR, COMBINED Right 6/27/2023    Procedure: Right stealth craniotomy and resection of tumor;  Surgeon: Stephen Moran MD;  Location:  OR    ORTHOPEDIC SURGERY      Ganesh. Rotator cuff repair.    PLEUROSCOPY N/A  2022    Procedure: Pleuroscopy with Pleural Biopsy;  Surgeon: Dallin Agrawal MD;  Location: [facility] OR       Physical Exam:   GENERAL APPEARANCE: walking around his work environment, alert and no distress; neatly groomed; he looks more comfortable than past couple of visits.  EYES: Eyes grossly normal to inspection, PERRLA, conjunctivae and sclerae without injection or discharge, EOM intact   RESP:  no increased work of breathing; speaks in complete sentences;   MS: No musculoskeletal defects are noted  SKIN: No suspicious lesions or rashes, hydration status appears adequate with normal skin turgor   PSYCH: Alert and oriented x3; speech- coherent , normal rate and volume; able to articulate logical thoughts, able to abstract reason, no tangential thoughts, no hallucinations or delusions, mentation appears normal, Mood is euthymic. Affect is appropriate for this mood state and bright. Thought content is free of suicidal ideation, hallucinations, and delusions.  Eye contact is good during conversation.       Key Data Reviewed:  LABS: 8/10/2023- Cr 0.6, Albumin 4.2,  Hgb 13.9,      IMAGIN/10/2023 CT GONZALEZ W/CONTRAST  IMPRESSION:  1.  Stable exam without evidence for new disease.  2.  Stable pulmonary nodules including a dominant nodular opacity at  the right lower lobe.       2023 MR BRAIN WO & W/CONTRAST  IMPRESSION:     1. Right frontal lobe resection cavity demonstrates surrounding  vasogenic edema which is less in extent compared to 2023.  2. Persistent marginal enhancement along the right frontal resection  cavity which may represent residual tumor or posttreatment change.  3. Multiple other smaller metastases as detailed.  4. No new metastases.        Cardiology 2023 QTc 482    41 minutes spent on the date of the encounter doing chart review, history and exam, patient education & counseling, documentation and other activities as noted above.    Ajith Brewer MD MS FAAFP CAQHPM  [facility]  Office [phone]  Fax [phone]

## 2023-08-14 NOTE — TELEPHONE ENCOUNTER
[facility]: Cancer Care                                                                                          Called pt to check on blood pressures since hydrochlorothiazide was increased from 25 mg to 50mg daily on 8/10 by Amber Scheierl JULIO. BP in clinic was 172/102 on 8/10 and pt reported home blood pressures around 160s-170s systolic, already on propanolol 40 mg BID and lisinopril 40 mg daily. Pt's infusion was rescheduled to 8/16 this week.    Reached vm and lmcb.    Signature:  Harjinder Tapia RN

## 2023-08-15 NOTE — TELEPHONE ENCOUNTER
Reached pt today and he stated BP did decrease a bit; this morning at 6:30 am before meds 147/97. Has not checked this afternoon yet, just driving home now but spouse has all the numbers written down. Informed pt once he arrives him to give RN[facility] a call, he stated he would.

## 2023-08-16 ENCOUNTER — INFUSION THERAPY VISIT (OUTPATIENT)
Dept: ONCOLOGY | Facility: CLINIC | Age: 43
End: 2023-08-16
Attending: PHYSICIAN ASSISTANT
Payer: COMMERCIAL

## 2023-08-16 ENCOUNTER — APPOINTMENT (OUTPATIENT)
Dept: LAB | Facility: CLINIC | Age: 43
End: 2023-08-16
Attending: NURSE PRACTITIONER
Payer: COMMERCIAL

## 2023-08-16 ENCOUNTER — VIRTUAL VISIT (OUTPATIENT)
Dept: ONCOLOGY | Facility: CLINIC | Age: 43
End: 2023-08-16
Attending: NURSE PRACTITIONER
Payer: COMMERCIAL

## 2023-08-16 ENCOUNTER — TELEPHONE (OUTPATIENT)
Dept: ONCOLOGY | Facility: CLINIC | Age: 43
End: 2023-08-16

## 2023-08-16 ENCOUNTER — TELEPHONE (OUTPATIENT)
Dept: ONCOLOGY | Facility: CLINIC | Age: 43
End: 2023-08-16
Payer: MEDICAID

## 2023-08-16 VITALS
WEIGHT: 217.6 LBS | BODY MASS INDEX: 32.13 KG/M2 | OXYGEN SATURATION: 92 % | RESPIRATION RATE: 16 BRPM | DIASTOLIC BLOOD PRESSURE: 103 MMHG | SYSTOLIC BLOOD PRESSURE: 142 MMHG | HEART RATE: 60 BPM | TEMPERATURE: 97.7 F

## 2023-08-16 DIAGNOSIS — Z79.899 ENCOUNTER FOR LONG-TERM (CURRENT) USE OF MEDICATIONS: ICD-10-CM

## 2023-08-16 DIAGNOSIS — I10 PRIMARY HYPERTENSION: Primary | ICD-10-CM
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
## 2023-08-16 DIAGNOSIS — D49.6 BRAIN TUMOR (H): ICD-10-CM

## 2023-08-16 DIAGNOSIS — E11.10 TYPE 2 DIABETES MELLITUS WITH KETOACIDOSIS WITHOUT COMA, WITH LONG-TERM CURRENT USE OF INSULIN (H): ICD-10-CM

## 2023-08-16 DIAGNOSIS — I67.89 RADIATION THERAPY INDUCED BRAIN NECROSIS: ICD-10-CM

## 2023-08-16 DIAGNOSIS — C34.31 MALIGNANT NEOPLASM OF LOWER LOBE OF RIGHT LUNG (H): Primary | ICD-10-CM

## 2023-08-16 DIAGNOSIS — C79.31 MALIGNANT NEOPLASM METASTATIC TO BRAIN (H): ICD-10-CM

## 2023-08-16 DIAGNOSIS — C34.31 MALIGNANT NEOPLASM OF LOWER LOBE OF RIGHT LUNG (H): ICD-10-CM

## 2023-08-16 DIAGNOSIS — Z79.4 TYPE 2 DIABETES MELLITUS WITH KETOACIDOSIS WITHOUT COMA, WITH LONG-TERM CURRENT USE OF INSULIN (H): ICD-10-CM

## 2023-08-16 DIAGNOSIS — R51.9 NONINTRACTABLE EPISODIC HEADACHE, UNSPECIFIED HEADACHE TYPE: ICD-10-CM

## 2023-08-16 LAB
ALBUMIN UR-MCNC: 30 MG/DL
BASOPHILS # BLD AUTO: 0.1 10E3/UL (ref 0–0.2)
BASOPHILS NFR BLD AUTO: 1 %
EOSINOPHIL # BLD AUTO: 0.4 10E3/UL (ref 0–0.7)
EOSINOPHIL NFR BLD AUTO: 4 %
ERYTHROCYTE [DISTWIDTH] IN BLOOD BY AUTOMATED COUNT: 12.9 % (ref 10–15)
HCT VFR BLD AUTO: 41.3 % (ref 40–53)
HGB BLD-MCNC: 14.9 G/DL (ref 13.3–17.7)
IMM GRANULOCYTES # BLD: 0.1 10E3/UL
IMM GRANULOCYTES NFR BLD: 1 %
LYMPHOCYTES # BLD AUTO: 3.1 10E3/UL (ref 0.8–5.3)
LYMPHOCYTES NFR BLD AUTO: 31 %
MAGNESIUM SERPL-MCNC: 1.7 MG/DL (ref 1.7–2.3)
MCH RBC QN AUTO: 31 PG (ref 26.5–33)
MCHC RBC AUTO-ENTMCNC: 36.1 G/DL (ref 31.5–36.5)
MCV RBC AUTO: 86 FL (ref 78–100)
MONOCYTES # BLD AUTO: 0.7 10E3/UL (ref 0–1.3)
MONOCYTES NFR BLD AUTO: 6 %
NEUTROPHILS # BLD AUTO: 5.8 10E3/UL (ref 1.6–8.3)
NEUTROPHILS NFR BLD AUTO: 57 %
NRBC # BLD AUTO: 0 10E3/UL
NRBC BLD AUTO-RTO: 0 /100
PLATELET # BLD AUTO: 228 10E3/UL (ref 150–450)
RBC # BLD AUTO: 4.81 10E6/UL (ref 4.4–5.9)
WBC # BLD AUTO: 10.1 10E3/UL (ref 4–11)

## 2023-08-16 PROCEDURE — 85025 COMPLETE CBC W/AUTO DIFF WBC: CPT

## 2023-08-16 PROCEDURE — 99215 OFFICE O/P EST HI 40 MIN: CPT | Mod: 95 | Performed by: PHYSICIAN ASSISTANT

## 2023-08-16 PROCEDURE — 81003 URINALYSIS AUTO W/O SCOPE: CPT | Performed by: PHYSICIAN ASSISTANT

## 2023-08-16 PROCEDURE — 258N000003 HC RX IP 258 OP 636: Performed by: PHYSICIAN ASSISTANT

## 2023-08-16 PROCEDURE — 250N000011 HC RX IP 250 OP 636: Mod: JZ | Performed by: PHYSICIAN ASSISTANT

## 2023-08-16 PROCEDURE — 96413 CHEMO IV INFUSION 1 HR: CPT

## 2023-08-16 PROCEDURE — 83735 ASSAY OF MAGNESIUM: CPT | Performed by: PHYSICIAN ASSISTANT

## 2023-08-16 RX ORDER — DEXAMETHASONE 2 MG/1
TABLET ORAL
Qty: 12 TABLET | Refills: 0 | Status: SHIPPED | OUTPATIENT
Start: 2023-08-16 | End: 2023-10-06

## 2023-08-16 RX ORDER — DIPHENHYDRAMINE HYDROCHLORIDE 50 MG/ML
50 INJECTION INTRAMUSCULAR; INTRAVENOUS
Status: CANCELLED
Start: 2023-08-17

## 2023-08-16 RX ORDER — PROPRANOLOL HYDROCHLORIDE 20 MG/1
40 TABLET ORAL 3 TIMES DAILY
Qty: 180 TABLET | Refills: 1 | Status: SHIPPED | OUTPATIENT
Start: 2023-08-16 | End: 2023-11-17

## 2023-08-16 RX ORDER — MEPERIDINE HYDROCHLORIDE 25 MG/ML
25 INJECTION INTRAMUSCULAR; INTRAVENOUS; SUBCUTANEOUS EVERY 30 MIN PRN
Status: CANCELLED | OUTPATIENT
Start: 2023-08-17

## 2023-08-16 RX ORDER — ALBUTEROL SULFATE 90 UG/1
1-2 AEROSOL, METERED RESPIRATORY (INHALATION)
Status: CANCELLED
Start: 2023-08-17

## 2023-08-16 RX ORDER — ALBUTEROL SULFATE 0.83 MG/ML
2.5 SOLUTION RESPIRATORY (INHALATION)
Status: CANCELLED | OUTPATIENT
Start: 2023-08-17

## 2023-08-16 RX ORDER — METHYLPREDNISOLONE SODIUM SUCCINATE 125 MG/2ML
125 INJECTION, POWDER, LYOPHILIZED, FOR SOLUTION INTRAMUSCULAR; INTRAVENOUS
Status: CANCELLED
Start: 2023-08-17

## 2023-08-16 RX ORDER — HEPARIN SODIUM,PORCINE 10 UNIT/ML
5-20 VIAL (ML) INTRAVENOUS DAILY PRN
Status: CANCELLED | OUTPATIENT
Start: 2023-08-17

## 2023-08-16 RX ORDER — LORAZEPAM 2 MG/ML
0.5 INJECTION INTRAMUSCULAR EVERY 4 HOURS PRN
Status: CANCELLED | OUTPATIENT
Start: 2023-08-17

## 2023-08-16 RX ORDER — EPINEPHRINE 1 MG/ML
0.3 INJECTION, SOLUTION INTRAMUSCULAR; SUBCUTANEOUS EVERY 5 MIN PRN
Status: CANCELLED | OUTPATIENT
Start: 2023-08-17

## 2023-08-16 RX ORDER — HEPARIN SODIUM (PORCINE) LOCK FLUSH IV SOLN 100 UNIT/ML 100 UNIT/ML
5 SOLUTION INTRAVENOUS
Status: CANCELLED | OUTPATIENT
Start: 2023-08-17

## 2023-08-16 RX ADMIN — SODIUM CHLORIDE 250 ML: 9 INJECTION, SOLUTION INTRAVENOUS at 13:57

## 2023-08-16 RX ADMIN — BEVACIZUMAB 1500 MG: 400 INJECTION, SOLUTION INTRAVENOUS at 14:19

## 2023-08-16 ASSESSMENT — PAIN SCALES - GENERAL: PAINLEVEL: NO PAIN (0)

## 2023-08-16 NOTE — Clinical Note
8/16/2023         RE: Connor Emerson  [address]  [facility] [address]        Dear Colleague,    Thank you for referring your patient, Connor Emerson, to the [facility]. Please see a copy of my visit note below.    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 He is currently undergoing treatment with brigatinib and bevacizumab. Please see previous note by Dr. Persaud for further details on the patient's history. He comes in today for bevacizumab but was asked to be seen due to elevated BP. He is accompanied by his wife.     Interval History:  -Connor feels he is doing ok today. I did call his wife as well to confirm home BP readings which have improved to 140-150/.  (They were in the 170/100 + range) he has been taking the propanolol 40mg BID and hydrochlorothiazide to 50mg. He has had ongoing headaches since his last brain surgery. These are overall improving but are still bothersome and and are essentially constant. He follows with palliative for pain control.    -No speech changes, vision changes, paresthesias, focal weakness, leg swelling, or activity concerning for seizures.  -He has felt a bit more tired and forgetful over the last weak however this is not new  -No chest pain or SOB.   -No cough or fever.  -Having regular BM;s, last this AM. No abdominal pain.  -No bleeding concerns.  -Some peeling to skin on left hand which is not painful. Reports this happens once per year for 1 week and then goes away. He states its getting better.   -MSK pain better since dose decrease in brigatinib  -he reports nausea and vomiting after his Avastin infusion, however once he started compazine, it was better.     Review of Systems:  As  noted in HPI  Physical Examination:   8/16/2023  11:58 AM 8/16/2023  1:38 PM   Vitals     Systolic 154 !  142 !    Diastolic 104 (H)  103 (H)    Pulse 60     Respiration 16     Temp 97.7  F (36.5  C)     O2 92 %     Pain Score 0 (None)     Weight 217 lb 9.6 oz        Legend:    General: Well-appearing male in no acute distress.  Eyes: EOMI, PERRL. No scleral icterus or conjunctival injection.  ENT: Oral mucosa is moist without lesions or thrush.   Lymphatic: Neck is supple without cervical or supraclavicular lymphadenopathy.   Cardiovascular: RRR No murmurs. No peripheral edema.  Respiratory: CTA bilaterally. No wheezes or crackles.  Gastrointestinal: BS +. Abdomen rounded, soft, non-tender.   Neurologic: Cranial nerves II through XII are grossly intact. No pronation or drift.  strength and hip flexion 5/5. Sensation intact to light touch to distal extremities. Finger to nose coordinated.   Skin: Peeling to skin left hand without associated erythema. No rashes, petechiae, or bruising noted on exposed skin.   Psych: Alert. Pleasant. Comprehension intact.   Laboratory Data:  Most Recent 3 CBC's:  Recent Labs   Lab Test 08/16/23  1204 08/10/23  0956 07/20/23  1536   WBC 10.1 10.1 8.2   HGB 14.9 13.9 14.6   MCV 86 88 93    271 195   ANEUTAUTO 5.8 6.3 4.7    Most Recent 3 BMP's:  Recent Labs   Lab Test 08/16/23  1204 08/10/23  0952 07/20/23  1536    141 144   POTASSIUM 3.4 3.3* 4.1   CHLORIDE 99 101 105   CO2 29 29 28   BUN 12.6 15.9 14.8   CR 0.67 0.60* 0.52*   ANIONGAP 12 11 11   TORY 9.5 9.9 9.2   * 223*  223* 256*   PROTTOTAL 6.9 6.4 6.3*   ALBUMIN 4.5 4.2 3.9    Most Recent 2 LFT's:  Recent Labs   Lab Test 08/16/23  1204 08/10/23  0952   AST 22 15   ALT 15 14   ALKPHOS 89 94   BILITOTAL 0.6 0.3    Most Recent TSH and T4:  Recent Labs   Lab Test 09/12/22  1642   TSH 2.56   Reviewed lipase, amylase, CK, and magnesium. Lipase elevated some at 101. Mg slightly low.   I reviewed the above labs  today.    Imaging:  MR Brain w/o & w Contrast  Narrative: MRI BRAIN WITHOUT AND WITH CONTRAST  7/27/2023 7:47 AM    HISTORY: Brain tumor (H). Status post craniotomy. Headache.     TECHNIQUE:  Multiplanar, multisequence MRI of the brain without and  with 10 mL Gadavist.    COMPARISON: Head MRI 6/28/2023    FINDINGS: Changes of right frontal craniotomy with underlying right  frontal lobe resection cavity. Irregular enhancement is present along  the margins of the resection cavity extending to the frontal horn of  the right lateral ventricle. The extent of enhancement has slightly  increased compared to the prior exam.    The extent of vasogenic edema throughout the right frontal lobe  extending along the deep gray matter and across the attending of the  corpus callosum is overall slightly less in extent compared to the  prior exam. The extent of right to left midline shift has slightly  improved.    Changes of prior left parietal craniotomy with underlying resection  cavity and enhancement, similar compared to the prior, presumably  postoperative.    Multiple other smaller metastases are present. Group of small left  cerebellar metastases are not significantly changed. Group of small  left parietal lobe metastases are not seemingly unchanged. Punctate  right parietal lobe metastasis is not significantly changed. Small  group of metastases involving the left frontal white matter appear  slightly less conspicuous. Small right anterior inferior temporal lobe  metastasis is not significantly changed.    No new metastases are identified. T2 gradient echo imaging  demonstrates areas of susceptibility-related signal loss corresponding  to the lesions and resection cavities, consistent with hemosiderin  deposition. No evidence of acute ischemia, hemorrhage, or  hydrocephalus.    Marrow signal is otherwise within normal limits. Mild paranasal sinus  mucosal thickening. The visualized tympanic and mastoid cavities  are  unremarkable.  Impression: IMPRESSION:    1. Right frontal lobe resection cavity demonstrates surrounding  vasogenic edema which is less in extent compared to 6/28/2023.  2. Persistent marginal enhancement along the right frontal resection  cavity which may represent residual tumor or posttreatment change.  3. Multiple other smaller metastases as detailed.  4. No new metastases.    VIDYA SLAUGHTER MD         SYSTEM ID:  [id number]    I reviewed the above imaging today.    Assessment and Plan:    Connor was seen today to follow up his blood pressure management in the setting of Avastin usage.  He has been compliant with the 40 mg of propanolol BID and 50 mg of hydrochlorothiazide.  His BP has been better in the 140-150/90 range at home.     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  Myalgias are controlled and stable.  Continue current dose      # Brain mets:  # Radiation necrosis  -Baseline Brain MRI with several brain mets, s/p GK to 11-12 brain mets. F/u Brain MRI in June 2022 was showing enlargement of the one of the lesions along with edema, therefore had to undergo craniotomy followed by resection, the final biopsy consistent with radiation necrosis. -currently on bevacizumab for radiation necrosis --15 mg/kg every 3 weeks.    (Of note, Connor S/p 2 doses of Bevacixumab fall of 2022, stopped due HTN urgency and PE.) MRI in 4/2023 now showing contrast enhancement of lesions and a dominat lesion int he rt frontal regionw ith edema, several other smaller lesion. Short term MRI showing increase in size of the rt frontal lesion, underwent resection, patho again showing radiation necrosis.   -here for delayed C2, will go ahead with today's infusion given his BP has improved.  He does however need tighter control.  Will increase his  propanolol to 40 mg TID.  Continue 50 mg of hydrochlorothiazide and lisinopril 40 mg daily.  If needed could consider CCB or hydralazine.   -Check BP twice daily. Educated on risk of stroke due to uncontrolled BP and warning signs of this that warrant immediate care.     Headaches  -Present since recent craniotomy on 6/27/23. MRI brain on 7/27/23 with reduced vasogenic edema. Clinically feels headaches are slowly improving.  Discussed a small taper of Dexamethasone given the N/V that occurred with the last Avastin dose and the ongoing headaches.  He was agreeable.         Elevated Lipase  -Elevated at 101. Not at grade 3 as it's currently <2xULN. Repeat labs in 1 week; if increases to grade 3, may need to hold Brigatinib until it improves.     Diabetes, Type 2  Reports improved control. Currently running about 150-170 at home  --on Metformin and insulin        60 minutes spent on the date of the encounter doing chart review, review of test results, interpretation of tests, patient visit, and documentation      Stella Navarro PA-C  [facility] [address] [id number] [phone]       Again, thank you for allowing me to participate in the care of your patient.        Sincerely,        Sarina Navarro PA-C

## 2023-08-16 NOTE — LETTER
August 20, 2023       TO: Connor Emerson  [address]  [facility] [address]       DearMr.Idania,    We are writing to inform you of your test results.    {[facility] results letter list:[id number]}    No results found from the In Basket message.    ***

## 2023-08-16 NOTE — TELEPHONE ENCOUNTER
Oral Chemotherapy Monitoring Program    Subjective/Objective:  Connor Emerson is a 43 year old male seen in clinic for a follow-up visit for oral chemotherapy. Connor confirmed taking brigatinib 120 mg daily and denies any missed dose in the last 2 weeks. Patient reports that myalgias/arthralgias have improved and are now more tolerable. Reports nausea attributed to bevacizumab but denies nausea relating to brigatinib. Denies diarrhea, constipation, peripheral edema, and rash/pruritus. Overall, patient reports tolerating brigatinib well.        7/13/2023    11:00 AM 7/21/2023    10:00 AM 7/25/2023     3:00 PM 7/28/2023    12:00 PM 7/28/2023     2:00 PM 8/2/2023    10:00 AM 8/16/2023     2:00 PM   ORAL CHEMOTHERAPY   Assessment Type Other Other Left Voicemail Refill Other Other Initial Follow up   Diagnosis Code Non-Small Cell Lung Cancer Non-Small Cell Lung Cancer Non-Small Cell Lung Cancer Non-Small Cell Lung Cancer Non-Small Cell Lung Cancer Non-Small Cell Lung Cancer Non-Small Cell Lung Cancer   Providers Dr Cori Persaud   [facility]   Is this patient followed by the [facility] OC team?     No No No   Drug Name Alunbrig (brigatinib) Alunbrig (brigatinib) Alunbrig (brigatinib) Alunbrig (brigatinib) Alunbrig (brigatinib) Alunbrig (brigatinib) Alunbrig (brigatinib)   Dose 120 mg 120 mg  120 mg 120 mg 120 mg 120 mg   Current Schedule Daily Daily  Daily Daily Daily Daily   Cycle Details Continuous Continuous  Continuous Continuous Continuous Continuous   Doses missed in last 2 weeks       0   Adherence Assessment       Adherent       Last PHQ-2 Score on record:       7/12/2023    11:18 AM 4/6/2023    12:48 PM   PHQ-2 ( 1999 [employer])   Q1: Little interest or pleasure in doing things 0 0   Q2: Feeling down, depressed or hopeless 0 1   PHQ-2 Score 0 1   Q1: Little interest or pleasure in doing  "things  Not at all   Q2: Feeling down, depressed or hopeless  Several days   PHQ-2 Score  1       Vitals:  BP:   BP Readings from Last 1 Encounters:   08/16/23 (!) 142/103     Wt Readings from Last 1 Encounters:   08/16/23 98.7 kg (217 lb 9.6 oz)     Estimated body surface area is 2.19 meters squared as calculated from the following:    Height as of 8/14/23: 1.753 m (5' 9\").    Weight as of an earlier encounter on 8/16/23: 98.7 kg (217 lb 9.6 oz).    Labs:  _  Result Component Current Result Ref Range   Sodium 140 (8/16/2023) 136 - 145 mmol/L     _  Result Component Current Result Ref Range   Potassium 3.4 (8/16/2023) 3.4 - 5.3 mmol/L     _  Result Component Current Result Ref Range   Calcium 9.5 (8/16/2023) 8.6 - 10.0 mg/dL     _  Result Component Current Result Ref Range   Magnesium 1.7 (8/16/2023) 1.7 - 2.3 mg/dL     _  Result Component Current Result Ref Range   Phosphorus 3.9 (8/10/2023) 2.5 - 4.5 mg/dL     _  Result Component Current Result Ref Range   Albumin 4.5 (8/16/2023) 3.5 - 5.2 g/dL     _  Result Component Current Result Ref Range   Urea Nitrogen 12.6 (8/16/2023) 6.0 - 20.0 mg/dL     _  Result Component Current Result Ref Range   Creatinine 0.67 (8/16/2023) 0.67 - 1.17 mg/dL     _  Result Component Current Result Ref Range   AST 22 (8/16/2023) 0 - 45 U/L     _  Result Component Current Result Ref Range   ALT 15 (8/16/2023) 0 - 70 U/L     _  Result Component Current Result Ref Range   Bilirubin Total 0.6 (8/16/2023) <=1.2 mg/dL     _  Result Component Current Result Ref Range   WBC Count 10.1 (8/16/2023) 4.0 - 11.0 10e3/uL     _  Result Component Current Result Ref Range   Hemoglobin 14.9 (8/16/2023) 13.3 - 17.7 g/dL     _  Result Component Current Result Ref Range   Platelet Count 228 (8/16/2023) 150 - 450 10e3/uL     No results found for ANC within last 30 days.     _  Result Component Current Result Ref Range   Absolute Neutrophils 5.8 (8/16/2023) 1.6 - 8.3 10e3/uL          Assessment/Plan:  Patient " is tolerating therapy well following dose reduction.    Continue brigatinib as planned.    Follow-Up:  -Check for labs/infusion the week of 9/7  -Review appointment with Dr. Persaud 9/13    Edy Watson, Pharmacy Intern  Oral Chemotherapy Monitoring Program  [phone]

## 2023-08-16 NOTE — NURSING NOTE
Chief Complaint   Patient presents with    Chemotherapy     C2D1 Avastin    Blood Draw     Labs drawn via PIV by RN in lab. VS taken.      Labs drawn via peripheral IV. Vital signs taken. Checked into next appointment.   Chelsea Jack RN

## 2023-08-16 NOTE — PROGRESS NOTES
Infusion Nursing Note:  Connor Emerson presents today for C2D1 Avastin.    Patient seen by provider today: Yes: Sarina REYES   present during visit today: Not Applicable.    Note: Instruction given to patient as per provider. Appointments are not made yet by the time patient left the facility. Patient will monitor it at [facility]. Verbalized understanding.       Intravenous Access:  Peripheral IV placed.    Treatment Conditions:  Lab Results   Component Value Date    HGB 14.9 08/16/2023    WBC 10.1 08/16/2023    ANEU 7.8 08/20/2016    ANEUTAUTO 5.8 08/16/2023     08/16/2023        Lab Results   Component Value Date     08/16/2023    POTASSIUM 3.4 08/16/2023    MAG 1.7 08/16/2023    CR 0.67 08/16/2023    TORY 9.5 08/16/2023    BILITOTAL 0.6 08/16/2023    ALBUMIN 4.5 08/16/2023    ALT 15 08/16/2023    AST 22 08/16/2023       Results reviewed, labs did NOT meet treatment parameters: See TORB.  Urine 30.    TORB: 8/16/23/Sarina REYES/Kareen Gonzales RN/ /103, OK for Avastin. His BP is trending down over the last week and I increased his propranolol. Urine protein 30, no need for 24 hour urine at this time. Will see next cycle.       Post Infusion Assessment:  Patient tolerated infusion without incident.  Blood return noted pre and post infusion.  Site patent and intact, free from redness, edema or discomfort.  No evidence of extravasations.  Access discontinued per protocol.       Discharge Plan:   Prescription refills given for Propranolol and Dexamethasone.  Discharge instructions reviewed with: Patient.  Patient and/or family verbalized understanding of discharge instructions and all questions answered.  [facility]  Patient will return in 3 weeks for next provider appointment.   Patient discharged in stable condition accompanied by: self.  Departure Mode: Ambulatory.      LEONA SAUCEDO RN

## 2023-08-20 NOTE — PROGRESS NOTES
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 He is currently undergoing treatment with brigatinib and bevacizumab. Please see previous note by Dr. Persaud for further details on the patient's history. He comes in today for bevacizumab but was asked to be seen due to elevated BP. He is accompanied by his wife.     Interval History:  -Connor feels he is doing ok today. I did call his wife as well to confirm home BP readings which have improved to 140-150/.  (They were in the 170/100 + range) he has been taking the propanolol 40mg BID and hydrochlorothiazide to 50mg. He has had ongoing headaches since his last brain surgery. These are overall improving but are still bothersome and and are essentially constant. He follows with palliative for pain control.    -No speech changes, vision changes, paresthesias, focal weakness, leg swelling, or activity concerning for seizures.  -He has felt a bit more tired and forgetful over the last weak however this is not new  -No chest pain or SOB.   -No cough or fever.  -Having regular BM;s, last this AM. No abdominal pain.  -No bleeding concerns.  -Some peeling to skin on left hand which is not painful. Reports this happens once per year for 1 week and then goes away. He states its getting better.   -MSK pain better since dose decrease in brigatinib  -he reports nausea and vomiting after his Avastin infusion, however once he started compazine, it was better.     Review of Systems:  As noted in HPI  Physical Examination:   8/16/2023  11:58 AM 8/16/2023  1:38 PM   Vitals     Systolic 154 !  142 !    Diastolic 104 (H)  103 (H)    Pulse 60     Respiration 16     Temp 97.7  F (36.5  C)     O2 92 %     Pain Score 0 (None)     Weight 217 lb 9.6 oz         Legend:    General: Well-appearing male in no acute distress.  Eyes: EOMI, PERRL. No scleral icterus or conjunctival injection.  ENT: Oral mucosa is moist without lesions or thrush.   Lymphatic: Neck is supple without cervical or supraclavicular lymphadenopathy.   Cardiovascular: RRR No murmurs. No peripheral edema.  Respiratory: CTA bilaterally. No wheezes or crackles.  Gastrointestinal: BS +. Abdomen rounded, soft, non-tender.   Neurologic: Cranial nerves II through XII are grossly intact. No pronation or drift.  strength and hip flexion 5/5. Sensation intact to light touch to distal extremities. Finger to nose coordinated.   Skin: Peeling to skin left hand without associated erythema. No rashes, petechiae, or bruising noted on exposed skin.   Psych: Alert. Pleasant. Comprehension intact.   Laboratory Data:  Most Recent 3 CBC's:  Recent Labs   Lab Test 08/16/23  1204 08/10/23  0956 07/20/23  1536   WBC 10.1 10.1 8.2   HGB 14.9 13.9 14.6   MCV 86 88 93    271 195   ANEUTAUTO 5.8 6.3 4.7    Most Recent 3 BMP's:  Recent Labs   Lab Test 08/16/23  1204 08/10/23  0952 07/20/23  1536    141 144   POTASSIUM 3.4 3.3* 4.1   CHLORIDE 99 101 105   CO2 29 29 28   BUN 12.6 15.9 14.8   CR 0.67 0.60* 0.52*   ANIONGAP 12 11 11   TORY 9.5 9.9 9.2   * 223*  223* 256*   PROTTOTAL 6.9 6.4 6.3*   ALBUMIN 4.5 4.2 3.9    Most Recent 2 LFT's:  Recent Labs   Lab Test 08/16/23  1204 08/10/23  0952   AST 22 15   ALT 15 14   ALKPHOS 89 94   BILITOTAL 0.6 0.3    Most Recent TSH and T4:  Recent Labs   Lab Test 09/12/22  1642   TSH 2.56   Reviewed lipase, amylase, CK, and magnesium. Lipase elevated some at 101. Mg slightly low.   I reviewed the above labs today.    Imaging:  MR Brain w/o & w Contrast  Narrative: MRI BRAIN WITHOUT AND WITH CONTRAST  7/27/2023 7:47 AM    HISTORY: Brain tumor (H). Status post craniotomy. Headache.     TECHNIQUE:  Multiplanar, multisequence MRI of the brain without and  with 10 mL  Gadavist.    COMPARISON: Head MRI 6/28/2023    FINDINGS: Changes of right frontal craniotomy with underlying right  frontal lobe resection cavity. Irregular enhancement is present along  the margins of the resection cavity extending to the frontal horn of  the right lateral ventricle. The extent of enhancement has slightly  increased compared to the prior exam.    The extent of vasogenic edema throughout the right frontal lobe  extending along the deep gray matter and across the attending of the  corpus callosum is overall slightly less in extent compared to the  prior exam. The extent of right to left midline shift has slightly  improved.    Changes of prior left parietal craniotomy with underlying resection  cavity and enhancement, similar compared to the prior, presumably  postoperative.    Multiple other smaller metastases are present. Group of small left  cerebellar metastases are not significantly changed. Group of small  left parietal lobe metastases are not seemingly unchanged. Punctate  right parietal lobe metastasis is not significantly changed. Small  group of metastases involving the left frontal white matter appear  slightly less conspicuous. Small right anterior inferior temporal lobe  metastasis is not significantly changed.    No new metastases are identified. T2 gradient echo imaging  demonstrates areas of susceptibility-related signal loss corresponding  to the lesions and resection cavities, consistent with hemosiderin  deposition. No evidence of acute ischemia, hemorrhage, or  hydrocephalus.    Marrow signal is otherwise within normal limits. Mild paranasal sinus  mucosal thickening. The visualized tympanic and mastoid cavities are  unremarkable.  Impression: IMPRESSION:    1. Right frontal lobe resection cavity demonstrates surrounding  vasogenic edema which is less in extent compared to 6/28/2023.  2. Persistent marginal enhancement along the right frontal resection  cavity which may represent  residual tumor or posttreatment change.  3. Multiple other smaller metastases as detailed.  4. No new metastases.    VIDYA SLAUGHTER MD         SYSTEM ID:  [id number]    I reviewed the above imaging today.    Assessment and Plan:    Connor was seen today to follow up his blood pressure management in the setting of Avastin usage.  He has been compliant with the 40 mg of propanolol BID and 50 mg of hydrochlorothiazide.  His BP has been better in the 140-150/90 range at home.     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  Myalgias are controlled and stable.  Continue current dose      # Brain mets:  # Radiation necrosis  -Baseline Brain MRI with several brain mets, s/p GK to 11-12 brain mets. F/u Brain MRI in June 2022 was showing enlargement of the one of the lesions along with edema, therefore had to undergo craniotomy followed by resection, the final biopsy consistent with radiation necrosis. -currently on bevacizumab for radiation necrosis --15 mg/kg every 3 weeks.    (Of note, Connor S/p 2 doses of Bevacixumab fall of 2022, stopped due HTN urgency and PE.) MRI in 4/2023 now showing contrast enhancement of lesions and a dominat lesion int he rt frontal regionw ith edema, several other smaller lesion. Short term MRI showing increase in size of the rt frontal lesion, underwent resection, patho again showing radiation necrosis.   -here for delayed C2, will go ahead with today's infusion given his BP has improved.  He does however need tighter control.  Will increase his propanolol to 40 mg TID.  Continue 50 mg of hydrochlorothiazide and lisinopril 40 mg daily.  If needed could consider CCB or hydralazine.   -Check BP twice daily. Educated on risk of stroke due to uncontrolled BP and warning signs of this that warrant immediate care.      Headaches  -Present since recent craniotomy on 6/27/23. MRI brain on 7/27/23 with reduced vasogenic edema. Clinically feels headaches are slowly improving.  Discussed a small taper of Dexamethasone given the N/V that occurred with the last Avastin dose and the ongoing headaches.  He was agreeable.         Elevated Lipase  -Elevated at 101. Not at grade 3 as it's currently <2xULN. Repeat labs in 1 week; if increases to grade 3, may need to hold Brigatinib until it improves.     Diabetes, Type 2  Reports improved control. Currently running about 150-170 at home  --on Metformin and insulin        60 minutes spent on the date of the encounter doing chart review, review of test results, interpretation of tests, patient visit, and documentation      Stella Navarro PA-C  [facility]  [address] [place] [id number] [phone]

## 2023-08-21 ENCOUNTER — VIRTUAL VISIT (OUTPATIENT)
Dept: ONCOLOGY | Facility: CLINIC | Age: 43
End: 2023-08-21
Attending: SOCIAL WORKER
Payer: COMMERCIAL

## 2023-08-21 DIAGNOSIS — F41.9 ANXIETY: Primary | ICD-10-CM

## 2023-08-21 DIAGNOSIS — Z51.5 PALLIATIVE CARE PATIENT: ICD-10-CM

## 2023-08-21 PROCEDURE — 90832 PSYTX W PT 30 MINUTES: CPT | Mod: VID | Performed by: SOCIAL WORKER

## 2023-08-21 NOTE — LETTER
8/21/2023         RE: Connor Emerson  [address]  [place] [address]        Dear Colleague,    Thank you for referring your patient, Connor Emerson, to the [facility]. Please see a copy of my visit note below.    Telemedicine Visit: The patient's condition can be safely assessed and treated via synchronous audio and visual telemedicine encounter.      Reason for Telemedicine Visit: Patient has requested telehealth visit    Originating Site (Patient Location): Patient's place of employment        Distant Location (provider location):  Off-site    Consent:  The patient/guardian has verbally consented to: the potential risks and benefits of telemedicine (video visit) versus in person care; bill my insurance or make self-payment for services provided; and responsibility for payment of non-covered services.     Mode of Communication:  Video Conference via [employer]    As the provider I attest to compliance with applicable laws and regulations related to telemedicine.     Palliative Care Clinical Social Work Return Appointment    PLEASE NOTE:  THIS IS A MENTAL HEALTH NOTE.  OTHER PROVIDERS VIEWING THIS NOTE SHOULD USE THIS ONLY FOR UNDERSTANDING THE CONTEXT OF THE PATIENT'S EXPERIENCE.  TOPICS DESCRIBED IN THIS NOTE SHOULD NOT BE REFERENCED TO THE PATIENT BY MEDICAL PROVIDERS.    Connor Emerson is a 43 year old man with diagnosis of metastatic lung cancer, seen today via [employer] for a return psychotherapy appointment to address anxiety  as it relates to coping with illness and treatment.      Mental Status Exam:(List all that apply)      Appearance: Appropriate      Eye Contact: Good       Orientation: Yes, x4      Mood: Normal  Fatigued         Affect: difficult to assess via phone       Thought Content: Clear         Thought Form: Logical      Psychomotor Behavior: Normal    Visit themes:     Adjustment to Illness: Symptoms better managed at this time.  He's been  "back to work for a week -- his employer called him back after 2 weeks into his 3 month leave.  He reports feeling glad for this as it gets him out of the house and gives some structure \"I need to be busy\" which has helped his anxiety symptoms as well.     Hoping to talk to his oncology team about prognosis in coming visits. I encouraged him to bring this up with his team.       Mental Health (thoughts, feelings, actions, coping, and symptoms): Feels he is coping well.  Going back to work has been helpful.  Did not identify further needs today and he needed to get back to work so asked that session be short today.      Helpful activities: back at work    Helpful cognitions:     Unhelpful and/or triggering or exacerbating factors:    Body-Mind Skills Education:     Relationships:     End of Life and Advance Care Planning:     Main therapeutic interventions provided this session include:   Facilitate processing of thoughts and feelings    Plan: he reports these check ins are helpful.  Will ask scheduling to reach out to schedule a follow up visit.     Time spent with patient/family:  17 minutes  (Start 1:00, end 1:17)    LISETH Barahona, [facility]   Palliative Care Clinical   Ph: [phone]      DO NOT SEND ANY LETTERS                Again, thank you for allowing me to participate in the care of your patient.        Sincerely,        EDUIN Barahona  "

## 2023-08-21 NOTE — LETTER
8/21/2023         RE: Connor Emerson  [address]  [place] [address]        Dear Colleague,    Thank you for referring your patient, Connor Emerson, to the [facility]. Please see a copy of my visit note below.    Telemedicine Visit: The patient's condition can be safely assessed and treated via synchronous audio and visual telemedicine encounter.      Reason for Telemedicine Visit: Patient has requested telehealth visit    Originating Site (Patient Location): Patient's place of employment        Distant Location (provider location):  Off-site    Consent:  The patient/guardian has verbally consented to: the potential risks and benefits of telemedicine (video visit) versus in person care; bill my insurance or make self-payment for services provided; and responsibility for payment of non-covered services.     Mode of Communication:  Video Conference via [employer]    As the provider I attest to compliance with applicable laws and regulations related to telemedicine.     Palliative Care Clinical Social Work Return Appointment    PLEASE NOTE:  THIS IS A MENTAL HEALTH NOTE.  OTHER PROVIDERS VIEWING THIS NOTE SHOULD USE THIS ONLY FOR UNDERSTANDING THE CONTEXT OF THE PATIENT'S EXPERIENCE.  TOPICS DESCRIBED IN THIS NOTE SHOULD NOT BE REFERENCED TO THE PATIENT BY MEDICAL PROVIDERS.    Connor Emerson is a 43 year old man with diagnosis of metastatic lung cancer, seen today via [employer] for a return psychotherapy appointment to address anxiety  as it relates to coping with illness and treatment.      Mental Status Exam:(List all that apply)      Appearance: Appropriate      Eye Contact: Good       Orientation: Yes, x4      Mood: Normal  Fatigued         Affect: difficult to assess via phone       Thought Content: Clear         Thought Form: Logical      Psychomotor Behavior: Normal    Visit themes:     Adjustment to Illness: Symptoms better managed at this time.  He's been  "back to work for a week -- his employer called him back after 2 weeks into his 3 month leave.  He reports feeling glad for this as it gets him out of the house and gives some structure \"I need to be busy\" which has helped his anxiety symptoms as well.     Hoping to talk to his oncology team about prognosis in coming visits. I encouraged him to bring this up with his team.       Mental Health (thoughts, feelings, actions, coping, and symptoms): Feels he is coping well.  Going back to work has been helpful.  Did not identify further needs today and he needed to get back to work so asked that session be short today.      Helpful activities: back at work    Helpful cognitions:     Unhelpful and/or triggering or exacerbating factors:    Body-Mind Skills Education:     Relationships:     End of Life and Advance Care Planning:     Main therapeutic interventions provided this session include:   Facilitate processing of thoughts and feelings    Plan: he reports these check ins are helpful.  Will ask scheduling to reach out to schedule a follow up visit.     Time spent with patient/family:  17 minutes  (Start 1:00, end 1:17)    LISETH Barahona, [facility]   Palliative Care Clinical   Ph: [phone]      DO NOT SEND ANY LETTERS                Again, thank you for allowing me to participate in the care of your patient.        Sincerely,        EDUIN Barahona  "

## 2023-08-21 NOTE — PROGRESS NOTES
"Telemedicine Visit: The patient's condition can be safely assessed and treated via synchronous audio and visual telemedicine encounter.      Reason for Telemedicine Visit: Patient has requested telehealth visit    Originating Site (Patient Location): Patient's place of employment        Distant Location (provider location):  Off-site    Consent:  The patient/guardian has verbally consented to: the potential risks and benefits of telemedicine (video visit) versus in person care; bill my insurance or make self-payment for services provided; and responsibility for payment of non-covered services.     Mode of Communication:  Video Conference via [employer]    As the provider I attest to compliance with applicable laws and regulations related to telemedicine.     Palliative Care Clinical Social Work Return Appointment    PLEASE NOTE:  THIS IS A MENTAL HEALTH NOTE.  OTHER PROVIDERS VIEWING THIS NOTE SHOULD USE THIS ONLY FOR UNDERSTANDING THE CONTEXT OF THE PATIENT'S EXPERIENCE.  TOPICS DESCRIBED IN THIS NOTE SHOULD NOT BE REFERENCED TO THE PATIENT BY MEDICAL PROVIDERS.    Connor Emerson is a 43 year old man with diagnosis of metastatic lung cancer, seen today via [employer] video for a return psychotherapy appointment to address anxiety  as it relates to coping with illness and treatment.      Mental Status Exam:(List all that apply)      Appearance: Appropriate      Eye Contact: Good       Orientation: Yes, x4      Mood: Normal  Fatigued         Affect: difficult to assess via phone       Thought Content: Clear         Thought Form: Logical      Psychomotor Behavior: Normal    Visit themes:     Adjustment to Illness: Symptoms better managed at this time.  He's been back to work for a week -- his employer called him back after 2 weeks into his 3 month leave.  He reports feeling glad for this as it gets him out of the house and gives some structure \"I need to be busy\" which has helped his anxiety symptoms as well.     Hoping to " talk to his oncology team about prognosis in coming visits. I encouraged him to bring this up with his team.       Mental Health (thoughts, feelings, actions, coping, and symptoms): Feels he is coping well.  Going back to work has been helpful.  Did not identify further needs today and he needed to get back to work so asked that session be short today.      Helpful activities: back at work    Helpful cognitions:     Unhelpful and/or triggering or exacerbating factors:    Body-Mind Skills Education:     Relationships:     End of Life and Advance Care Planning:     Main therapeutic interventions provided this session include:   Facilitate processing of thoughts and feelings    Plan: he reports these check ins are helpful.  Will ask scheduling to reach out to schedule a follow up visit.     Time spent with patient/family:  17 minutes  (Start 1:00, end 1:17)    LISETH Barahona, [facility]   Palliative Care Clinical   Ph: [phone]      DO NOT SEND ANY LETTERS

## 2023-08-22 ENCOUNTER — APPOINTMENT (OUTPATIENT)
Dept: GENERAL RADIOLOGY | Facility: CLINIC | Age: 43
End: 2023-08-22
Attending: EMERGENCY MEDICINE
Payer: COMMERCIAL

## 2023-08-22 ENCOUNTER — MYC MEDICAL ADVICE (OUTPATIENT)
Dept: PALLIATIVE CARE | Facility: CLINIC | Age: 43
End: 2023-08-22

## 2023-08-22 ENCOUNTER — HOSPITAL ENCOUNTER (EMERGENCY)
Facility: CLINIC | Age: 43
Discharge: HOME | End: 2023-08-22
Attending: EMERGENCY MEDICINE | Admitting: EMERGENCY MEDICINE
Payer: COMMERCIAL

## 2023-08-22 ENCOUNTER — APPOINTMENT (OUTPATIENT)
Dept: CT IMAGING | Facility: CLINIC | Age: 43
End: 2023-08-22
Attending: EMERGENCY MEDICINE
Payer: COMMERCIAL

## 2023-08-22 VITALS
WEIGHT: 217 LBS | SYSTOLIC BLOOD PRESSURE: 157 MMHG | RESPIRATION RATE: 20 BRPM | BODY MASS INDEX: 32.14 KG/M2 | HEART RATE: 60 BPM | DIASTOLIC BLOOD PRESSURE: 83 MMHG | TEMPERATURE: 98.3 F | OXYGEN SATURATION: 99 % | HEIGHT: 69 IN

## 2023-08-22 DIAGNOSIS — R06.6 HICCUPS: Primary | ICD-10-CM

## 2023-08-22 DIAGNOSIS — C34.90 NSCLC METASTATIC TO BRAIN (H): ICD-10-CM

## 2023-08-22 DIAGNOSIS — R06.6 HICCUPS: ICD-10-CM

## 2023-08-22 DIAGNOSIS — R06.02 SOB (SHORTNESS OF BREATH): ICD-10-CM

## 2023-08-22 DIAGNOSIS — R07.9 CHEST PAIN, UNSPECIFIED TYPE: ICD-10-CM

## 2023-08-22 DIAGNOSIS — C79.31 NSCLC METASTATIC TO BRAIN (H): ICD-10-CM

## 2023-08-22 LAB
ALBUMIN SERPL BCG-MCNC: 4.6 G/DL (ref 3.5–5.2)
ALP SERPL-CCNC: 91 U/L (ref 40–129)
ALT SERPL W P-5'-P-CCNC: 16 U/L (ref 0–70)
ANION GAP SERPL CALCULATED.3IONS-SCNC: 15 MMOL/L (ref 7–15)
AST SERPL W P-5'-P-CCNC: 17 U/L (ref 0–45)
ATRIAL RATE - MUSE: 64 BPM
BASOPHILS # BLD AUTO: 0.1 10E3/UL (ref 0–0.2)
BASOPHILS NFR BLD AUTO: 0 %
BILIRUB SERPL-MCNC: 0.5 MG/DL
BUN SERPL-MCNC: 33.4 MG/DL (ref 6–20)
CALCIUM SERPL-MCNC: 10.2 MG/DL (ref 8.6–10)
CHLORIDE SERPL-SCNC: 95 MMOL/L (ref 98–107)
CREAT SERPL-MCNC: 0.72 MG/DL (ref 0.67–1.17)
DEPRECATED HCO3 PLAS-SCNC: 30 MMOL/L (ref 22–29)
DIASTOLIC BLOOD PRESSURE - MUSE: NORMAL MMHG
EOSINOPHIL # BLD AUTO: 0.3 10E3/UL (ref 0–0.7)
EOSINOPHIL NFR BLD AUTO: 1 %
ERYTHROCYTE [DISTWIDTH] IN BLOOD BY AUTOMATED COUNT: 13.2 % (ref 10–15)
GFR SERPL CREATININE-BSD FRML MDRD: >90 ML/MIN/1.73M2
GLUCOSE SERPL-MCNC: 239 MG/DL (ref 70–99)
HCT VFR BLD AUTO: 43.3 % (ref 40–53)
HGB BLD-MCNC: 15.6 G/DL (ref 13.3–17.7)
HOLD SPECIMEN: NORMAL
HOLD SPECIMEN: NORMAL
IMM GRANULOCYTES # BLD: 0.1 10E3/UL
IMM GRANULOCYTES NFR BLD: 1 %
INTERPRETATION ECG - MUSE: NORMAL
LIPASE SERPL-CCNC: 53 U/L (ref 13–60)
LYMPHOCYTES # BLD AUTO: 3.6 10E3/UL (ref 0.8–5.3)
LYMPHOCYTES NFR BLD AUTO: 17 %
MCH RBC QN AUTO: 31 PG (ref 26.5–33)
MCHC RBC AUTO-ENTMCNC: 36 G/DL (ref 31.5–36.5)
MCV RBC AUTO: 86 FL (ref 78–100)
MONOCYTES # BLD AUTO: 0.9 10E3/UL (ref 0–1.3)
MONOCYTES NFR BLD AUTO: 4 %
NEUTROPHILS # BLD AUTO: 16.7 10E3/UL (ref 1.6–8.3)
NEUTROPHILS NFR BLD AUTO: 77 %
NRBC # BLD AUTO: 0 10E3/UL
NRBC BLD AUTO-RTO: 0 /100
P AXIS - MUSE: 30 DEGREES
PLATELET # BLD AUTO: 291 10E3/UL (ref 150–450)
POTASSIUM SERPL-SCNC: 3.5 MMOL/L (ref 3.4–5.3)
PR INTERVAL - MUSE: 148 MS
PROT SERPL-MCNC: 7.2 G/DL (ref 6.4–8.3)
QRS DURATION - MUSE: 86 MS
QT - MUSE: 448 MS
QTC - MUSE: 462 MS
R AXIS - MUSE: 99 DEGREES
RBC # BLD AUTO: 5.04 10E6/UL (ref 4.4–5.9)
SODIUM SERPL-SCNC: 140 MMOL/L (ref 136–145)
SYSTOLIC BLOOD PRESSURE - MUSE: NORMAL MMHG
T AXIS - MUSE: 5 DEGREES
TROPONIN T SERPL HS-MCNC: 11 NG/L
TROPONIN T SERPL HS-MCNC: 9 NG/L
VENTRICULAR RATE- MUSE: 64 BPM
WBC # BLD AUTO: 21.5 10E3/UL (ref 4–11)

## 2023-08-22 PROCEDURE — 85025 COMPLETE CBC W/AUTO DIFF WBC: CPT | Performed by: EMERGENCY MEDICINE

## 2023-08-22 PROCEDURE — 71046 X-RAY EXAM CHEST 2 VIEWS: CPT

## 2023-08-22 PROCEDURE — 271N000002 HC RX 271: Performed by: EMERGENCY MEDICINE

## 2023-08-22 PROCEDURE — 83690 ASSAY OF LIPASE: CPT | Performed by: EMERGENCY MEDICINE

## 2023-08-22 PROCEDURE — 250N000013 HC RX MED GY IP 250 OP 250 PS 637: Performed by: EMERGENCY MEDICINE

## 2023-08-22 PROCEDURE — 250N000011 HC RX IP 250 OP 636: Performed by: EMERGENCY MEDICINE

## 2023-08-22 PROCEDURE — 36415 COLL VENOUS BLD VENIPUNCTURE: CPT | Performed by: EMERGENCY MEDICINE

## 2023-08-22 PROCEDURE — 71275 CT ANGIOGRAPHY CHEST: CPT

## 2023-08-22 PROCEDURE — 84484 ASSAY OF TROPONIN QUANT: CPT | Mod: 91 | Performed by: EMERGENCY MEDICINE

## 2023-08-22 PROCEDURE — 99285 EMERGENCY DEPT VISIT HI MDM: CPT | Mod: 25

## 2023-08-22 PROCEDURE — 93005 ELECTROCARDIOGRAM TRACING: CPT

## 2023-08-22 PROCEDURE — 80053 COMPREHEN METABOLIC PANEL: CPT | Performed by: EMERGENCY MEDICINE

## 2023-08-22 PROCEDURE — 250N000009 HC RX 250: Performed by: EMERGENCY MEDICINE

## 2023-08-22 RX ORDER — IOPAMIDOL 755 MG/ML
500 INJECTION, SOLUTION INTRAVASCULAR ONCE
Status: COMPLETED | OUTPATIENT
Start: 2023-08-22 | End: 2023-08-22

## 2023-08-22 RX ORDER — LIDOCAINE HYDROCHLORIDE 20 MG/ML
5 SOLUTION OROPHARYNGEAL ONCE
Status: COMPLETED | OUTPATIENT
Start: 2023-08-22 | End: 2023-08-22

## 2023-08-22 RX ORDER — MAGNESIUM HYDROXIDE/ALUMINUM HYDROXICE/SIMETHICONE 120; 1200; 1200 MG/30ML; MG/30ML; MG/30ML
15 SUSPENSION ORAL ONCE
Status: COMPLETED | OUTPATIENT
Start: 2023-08-22 | End: 2023-08-22

## 2023-08-22 RX ADMIN — SODIUM CHLORIDE 90 ML: 9 INJECTION, SOLUTION INTRAVENOUS at 16:52

## 2023-08-22 RX ADMIN — ALUMINUM HYDROXIDE, MAGNESIUM HYDROXIDE, AND DIMETHICONE 15 ML: 200; 20; 200 SUSPENSION ORAL at 15:32

## 2023-08-22 RX ADMIN — LIDOCAINE HYDROCHLORIDE 5 ML: 1 POWDER at 15:32

## 2023-08-22 RX ADMIN — IOPAMIDOL 75 ML: 755 INJECTION, SOLUTION INTRAVENOUS at 16:52

## 2023-08-22 RX ADMIN — ALUMINUM HYDROXIDE, MAGNESIUM HYDROXIDE, AND DIMETHICONE 15 ML: 200; 20; 200 SUSPENSION ORAL at 18:15

## 2023-08-22 ASSESSMENT — ACTIVITIES OF DAILY LIVING (ADL)
ADLS_ACUITY_SCORE: 35

## 2023-08-22 NOTE — ED NOTES
"Pt does report that after taking a sip of \"diet coke\" from home, the hiccups restarted.    Dr. Ervin made aware. SEE MAR.  "

## 2023-08-22 NOTE — ED TRIAGE NOTES
Pt presents for evaluation of hiccups since Thursday. Pt got the hiccups after his chemo infusion of Avastin. Has had hiccups before from infusions. Received meds for hiccups in the past, but this time the hiccups are making pt feel short of breath and meds aren't working. Hx of lung cancer. Pain from the frequent hiccups. Hx of HTN, is on meds. Does not have a port.

## 2023-08-23 RX ORDER — BACLOFEN 10 MG/1
5-10 TABLET ORAL 3 TIMES DAILY PRN
Qty: 60 TABLET | Refills: 4 | Status: SHIPPED | OUTPATIENT
Start: 2023-08-23 | End: 2024-08-05

## 2023-08-23 NOTE — DISCHARGE INSTRUCTIONS
You can try over the counter Gas-X (Simethicone) for the Hiccups.  Follow up with your oncologist.   Return to the ER for any worsening or concerning symptoms    Discharge Instructions  Chest Pain    You have been seen today for chest pain or discomfort.  At this time, your provider has found no signs that your chest pain is due to a serious or life-threatening condition, (or you have declined more testing and/or admission to the hospital). However, sometimes there is a serious problem that does not show up right away. Your evaluation today may not be complete and you may need further testing and evaluation.     Generally, every Emergency Department visit should have a follow-up clinic visit with either a primary or a specialty clinic/provider. Please follow-up as instructed by your emergency provider today.  Return to the Emergency Department if:  Your chest pain changes, gets worse, starts to happen more often, or comes with less activity.  You are newly short of breath.  You get very weak or tired.  You pass out or faint.  You have any new symptoms, like fever, cough, numb legs, or you cough up blood.  You have anything else that worries you.    Until you follow-up with your regular provider, please do the following:  Take one aspirin daily unless you have an allergy or are told not to by your provider.  If a stress test appointment has been made, go to the appointment.  If you have questions, contact your regular provider.  Follow-up with your regular provider/clinic as directed; this is very important.    If you were given a prescription for medicine here today, be sure to read all of the information (including the package insert) that comes with your prescription.  This will include important information about the medicine, its side effects, and any warnings that you need to know about.  The pharmacist who fills the prescription can provide more information and answer questions you may have about the medicine.   If you have questions or concerns that the pharmacist cannot address, please call or return to the Emergency Department.       Remember that you can always come back to the Emergency Department if you are not able to see your regular provider in the amount of time listed above, if you get any new symptoms, or if there is anything that worries you.

## 2023-08-25 DIAGNOSIS — C34.31 MALIGNANT NEOPLASM OF LOWER LOBE OF RIGHT LUNG (H): Primary | ICD-10-CM

## 2023-08-27 ENCOUNTER — MYC REFILL (OUTPATIENT)
Dept: RADIATION ONCOLOGY | Facility: HOSPITAL | Age: 43
End: 2023-08-27
Payer: MEDICAID

## 2023-08-27 DIAGNOSIS — D49.6 BRAIN TUMOR (H): ICD-10-CM

## 2023-08-27 DIAGNOSIS — Z98.890 S/P CRANIOTOMY: ICD-10-CM

## 2023-08-28 RX ORDER — OXYCODONE HYDROCHLORIDE 10 MG/1
10 TABLET ORAL
Qty: 90 TABLET | Refills: 0 | Status: SHIPPED | OUTPATIENT
Start: 2023-08-28 | End: 2023-09-11

## 2023-08-28 NOTE — TELEPHONE ENCOUNTER
[facility] Palliative Care                                                                           Received MyChart from patient  requesting refill of oxycodone    Last refill ordered: 8/14/2023  Last office visit with Palliative Care 8/14/2023  Next office visit with Palliative Care:  not yet scheduled      Reviewed [place]  Report with no concerns  Last sold on: 8/15/2023 for 90 tabs / 12 days    Will route request to MD for review.    Pat Holbrook LPN  Palliative Care Nurse Coordinator  [phone]

## 2023-08-28 NOTE — TELEPHONE ENCOUNTER
Pt's wife is calling.  Pt is n a lot of pain.  Is out of oxycodone.  Pharmacy closes at 5.    Medication has been pended up to Dr. Brewer.  [id number] chat sent to Ayde to see if rx can be expedited.    RNCC sending to Dr. Lares to sign.

## 2023-09-03 ENCOUNTER — MYC REFILL (OUTPATIENT)
Dept: RADIATION ONCOLOGY | Facility: HOSPITAL | Age: 43
End: 2023-09-03
Payer: MEDICAID

## 2023-09-03 DIAGNOSIS — G89.3 CANCER ASSOCIATED PAIN: ICD-10-CM

## 2023-09-05 RX ORDER — METHADONE HYDROCHLORIDE 5 MG/1
TABLET ORAL
Qty: 45 TABLET | Refills: 0 | Status: SHIPPED | OUTPATIENT
Start: 2023-09-05 | End: 2023-10-12

## 2023-09-05 NOTE — TELEPHONE ENCOUNTER
Received [employer] message from patient requesting refill of methadone. EKG done 8/22/23.    Last refill: 8/8/23  Last office visit: 8/14/23  Scheduled for follow up 10/23/23     Will route request to MD for review.     Reviewed [place]  Report.

## 2023-09-11 ENCOUNTER — MYC REFILL (OUTPATIENT)
Dept: RADIATION ONCOLOGY | Facility: HOSPITAL | Age: 43
End: 2023-09-11
Payer: MEDICAID

## 2023-09-11 DIAGNOSIS — D49.6 BRAIN TUMOR (H): ICD-10-CM

## 2023-09-11 DIAGNOSIS — Z98.890 S/P CRANIOTOMY: ICD-10-CM

## 2023-09-11 RX ORDER — OXYCODONE HYDROCHLORIDE 10 MG/1
10 TABLET ORAL
Qty: 90 TABLET | Refills: 0 | Status: SHIPPED | OUTPATIENT
Start: 2023-09-11 | End: 2023-09-27

## 2023-09-11 NOTE — PROGRESS NOTES
Virtual Visit Details    Type of service:  Video Visit   Video Start Time: 3:23 PM  Video End Time:3:23 PM    Originating Location (pt. Location): Home    Distant Location (provider location):  On-site  Platform used for Video Visit: [facility] ONCOLOGY FOLLOW UP NOTE    PATIENT NAME: Connor Emerson  ENCOUNTER DATE: 9/13/2023    Care Team  Primary Oncologist: Bassam Persaud MD    REASON FOR CURRENT VISIT: F/u of lung cancer    HISTORY OF PRESENT ILLNESS:  MrBailee Emerson is a 42 year old  male who is a non-smoker with PMHx of T2DM, HTN with metastatic NSCLC comes for follow up     Oncologic Hx:    Diagnosis:   Stage IV NSCLC, Rt lung adenocarcinoma with metastasis to pleura, mediastinum , rt pleural effusion and brain diagnosed 1/2022 (AJCC 8th edition)  PD-L1 TPS 2-3% by [place]   NGS [facility] panel-EML4:ALK rearragement  NGS Guardant- GNAS R201H, KRAS K5E- No ALK    Treatment:    2/23/2022- current: Brigatinib. Dose reduced to 60 mg due to cough after two days of 90 mg daily -->back on 90 mg---> increased to 180 mg     7/20/23- Bevacizumab for radiation necrosis    3/2/22- 12/2022 Alectinib 300 mg BID (Dose reduced to 450 mg BID from 600 mg BID due to grade 3 myalgias 3/21/22, again reduced to 300 mg BID 9/28/22)    )  Past:  2/15/22- GK to 11 brain lesions  6/28/22- Craniotomy, resection  9/28/22-10/26- Bevacizumab for radiation necrosis (stopped due to PE)      Intent of treatment: Palliative    Oncologic course:  1/19/22 to 1/22/22-Admitted to [facility] for 2 week progressive SOB secondary to have large rt sided pleural effusion, needing thoracentesis x2 (1.7L and 2.0 L removed), cytology positive for malignancy, adenocarcinoma.   1/26/22- Rt pleural mass biopsy-Dr. Agrawal--POSITIVE FOR ADENOCARCINOMA CONSISTENT WITH LUNG PRIMARY, admixed with mesothelial hyperplasia and inflammatory infiltrate (+ TTF-1 and CK 7;  negative  p40, calretinin and WT-1. PAX8 immunostain focal +). 4th thoracentesis done  simultaneously - 3L approx removed.   2/1/22- PET/CT-Right lower lobe central infiltrative FDG avid 8.2 x 9.6 cm mass representing a primary lung adenocarcinoma. Ipsilateral right perihilar, bilateral pretracheal, subcarinal and superior mediastinal michele metastases. Contralateral mildly FDG avid few lung nodules are suspicious for contralateral metastasis. At least 3 intracranial metastases in the right frontal lobe, left frontal lobe and left cerebellar hemisphere. Nonspecific mild diffuse bone marrow uptake. Further evaluation with a spine MRI could be considered to rule out early marrow infiltration. This could also be seen with red marrow conversion.  2/5/22-  Brain MRI- At least 9 intracranial metastases as detailed above. The dominant lesions involving the orbital right frontal lobe, the posterior left middle frontal gyrus, anterior right temporal lobe and in the left cerebellar hemisphere have surrounding moderate vasogenic type edema.  2/15/22- Saw Dr. Arango from [facility] Onc- Rcd GK to 12 lesion in bran  2/16/22- Pleurex placement   3/2/22- Started Alectinib 600 mg BID  3/21/22- Dose reduced to 450 mg BID due to grade 3 myalgias and fatigue  4/2/22 to 4/5/22- Admitted at [facility] for- Severe sepsis due to MSSA infection of right PleurX catheter s/p removal- He presented with onset of pain at tube site starting 4/1; at arrival was tachycardic with leukocytosis (22.7) and elevated lactic acid (2.9).  CT chest showed fluid and stranding tracking outside the pleural space into chest wall along pleural catheter.  IR was consulted and removed catheter 4/2 with report of pustular drainage and tip culture growing MSSA.  Thoracic Surg was consulted who felt no surgical indication necessary given minimal pleural fluid and lack of any signs of abscess.  Initially treated with broad spectrum coverage for sepsis, narrowed to Ancef once sensitivities returned with plan to transition to cefadroxil for an additional 10  days at discharge per ID. Held drug 4/2 to 4/11 5/2/22- CT CAP- Overall, positive response to therapy with decreased size of right lower lobe and right pleural-based masses, pulmonary metastases, hilar and mediastinal lymphadenopathy. However, a single right posterior pleural-based mass has slightly increased in size since 2/24/2022. No metastatic disease in the abdomen and pelvis. Right Pleurx catheter has been removed. Trace right pleural effusion and right basilar atelectasis.  5/2/22- Brain MRI- The previously demonstrated brain metastases are mildly diminished in size versus to 2/5/2022. The degree of edema is also diminished but not completely resolved. Probable trace amounts of intralesional bleeding demonstrated on the gradient sequence within the metastases. No definite new metastasis or progressive mass effect. No hydrocephalus or infarct.    6/15/22 to 6/17/22- Admitted at [facility]-with aphasia and word finding difficulty over last few weeks.  He presented to [facility] ED on 6/10 for evaluation of his symptoms. MRI brain showed multiple intracranial metastases, with interval enlargement of the dominant lesion within the left frontal lobe and increased surrounding vasogenic edema with 2 mm rightward shift of the septum pellucidum. Due to his worsening anxiety, he left AMA. His symptoms continued to progress to where he could not write at work so he decided to go to the ED for re-evaluation and treatment. Evaluated by NSGY, Rad Onc (radiaiton necrosis vs tumor progression).  6/16/22- MR Brain (6/16) shows multiple intracranial metastases, with interval enlargement  of the dominant lesion within the left frontal lobe and increased surrounding vasogenic edema with 2 mm rightward shift of the septum pellucidum.  6/16/22- - CT CAP shows slightly decreased size of right lower lobe and right pleural-based masses. No new pulmonary nodules or lymphadenopathy; No evidence of metastatic disease in the abdomen or pelvis.    6/28/22 to 6/30/22- Admitted at [facility]- Elective left Stealth craniotomy with resection of brain tumor due to ongoing symptoms. No intraoperative complications. EBL 50 ml.  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  7/5/22- Ct CAP- Right lower lobe low-density nodules are not significantly changed. A small left upper lobe pulmonary nodule is also unchanged. Trace pleural fluid on the right has increased slightly. No convincing evidence for metastatic disease in the abdomen or pelvis.  7/18/22 to 7/19/22- Admitted to [facility] for seizure- Reportedly was only taking once Keppra instead of twice daily. Also resumed on dexamethasone 2 mg daily  8/1/22- Brain MRI- Redemonstrated postsurgical changes status post left frontoparietal Craniotomy. Interval increase in size of the dominant ring-enhancing lesion in the left posterior superior frontal lobe with increased moderate surrounding vasogenic edema and local mass effect resulting in narrowing of the supratentorial ventricular system. No significant midline shift/herniation at this time    8/1/22- Dex was increased to 4 mg daily by Dr. Moran    9/1/22- CT Chest- Near resolution of previously seen right pleural nodule. Stable right lower lobe pulmonary nodule    9/28/22- Bevacizumab for radiation necrosis    10/26/22- Bevacizumab     10/26/22- Ct CAP- Stable posterior medial right lower lobe 1.9 x 1.1 cm nodule series 8 image 176. Adjacent stable scarring and atelectasis. The previously noted pleural nodule posteriorly on the right is not currently clearly identified. Stable left upper lobe 0.3 cm nodule image 56    10/26/22- Brain MRI- Overall improved appearance of multiple intracranial metastases with near resolution of associated edema and diminished enhancement and size of multiple residual lesions. No definite new or progressive metastasis.    11/7/22 to 11/9/22- Admitted for PE and HTN urgency- Small pulmonary embolism in the right lower lobe pulmonary  artery. started on Lovenox, Brain MRI neg for PRES.    12/29/22- ED visit- bilateral hip pain, pain in shoulders, knuckles, knees, and ankles- holding alectinib since 12/20/22 1/6/23- Ct CAP- Mild groundglass nodularity in the left upper lobe is new since the previous exam, and may be infectious in etiology. No other significant interval change. Pulmonary nodules are not significantly changed.    1/6/23- Brain MRI- Stable to diminished sequelae of intracranial metastasis and treatment changes. No new or progressive metastasis. No superimposed acute intracranial finding.     2/23/2022- Start Brigatinib. Dose reduced to 60 mg due to cough after two days of 90 mg daily -  3/23/23-Brigatinib  (increase to 90 mg)    4/20/23- CT CAP- Stable- Previously noted mild groundglass nodularity in the left upper lobe has resolved.Pulmonary nodules are unchanged.Trace amount pleural fluid on the right has decreased slightly.    4/20/23- Brain MRI- Possile progression- Largest metastasis within the right frontal lobe has increased in size with worsening peripheral nodular enhancement and worsening vasogenic edema contributing to new right to left midline shift.  Multiple new/enlarging metastases scattered throughout the cerebral hemispheres and cerebellum. Started on dexamethasone by NGS on 4/28/23 5/17/23- presents to clinic with glucose 627, admission to hospital for stability on insulin drip    6/16/23- Brain MRI- Interval increase in size of the necrotic lesion in the anterior aspect of the right frontal lobe from 2.4 x 2.3 x 2.4 cm previously to 3.0 x 3.5 x 2.8 cm on the current study. Mass effect due to slightly increased vasogenic edema surrounding the right frontal lesion resulted in increase of leftward midline shift of the anterior interhemispheric fissure from 0.7 cm previously to 0.9 cm currently. Interval increase in size in two adjacent metastases at the frontoparietal junction on the left. The remaining  "scattered intra-axial enhancing nodules are not changed appreciably in size or appearance since the comparison study.No evidence for acute intracranial pathology.   Dr. Moran- Due to worsening headaches and more problems with walking. Recommended a right Stealth craniotomy for resection of the lesion.     6/27/23- Right stealth craniotomy and resection of tumor: Final path: radiation necrosis    7/10/23- Ct CAP- stable Stable exam without evidence for new disease.Stable pulmonary nodules including a dominant nodular opacity at the right lower lobe.    7/20/23- Bevacizumab for radiation necrosis    8/16/23- Bevacizumab     9/12/23- Ct CAP-  Stable right lower lobe dominant nodular opacity. No new disease in the chest, abdomen and pelvis.       He works as a maintenance manger for apartment complexes    Interval Hx:  -Connor is on a video visit to discuss CT results. Currently taking 120 mg of brigatinib.  Aches/stiffness in joints getting little worse.  Has gotne 2 bevacizumab infusions so far, 1st one made him sick Had nasuea, vomitig and addomnila issues  Dint have any issues with 2nd infusion  BP at home are \"better\", 140-90's on average, he has been taking the propanolol 40mg BID and hydrochlorothiazide to 50mg.   Headaches have resolved 2 weeks ago  Vision problames also resolved  No dysarthric speech      ECOG PS 0-1    REVIEW OF SYSTEMS: 14 point ROS negative other than the symptoms noted above in the HPI.    Wt Readings from Last 4 Encounters:   08/22/23 98.4 kg (217 lb)   08/16/23 98.7 kg (217 lb 9.6 oz)   08/14/23 98.4 kg (217 lb)   08/10/23 98.5 kg (217 lb 1.6 oz)      Review of Systems:  A comprehensive ROS was performed and found to be negative or non-contributory with the exception of that noted in the HPI above.    Past Medical History:  GERD  Hypertension, not on medication  Type 2 diabetes mellitus, not on medications currently, previously on Metformin    Past Surgical History:  Past Surgical History: "   Procedure Laterality Date    BRONCHOSCOPY RIGID OR FLEXIBLE W/TRANSENDOSCOPIC ENDOBRONCHIAL ULTRASOUND GUIDED Bilateral 2022    Procedure: Right BRONCHOSCOPY, FIBEROPTIC, endobronchial ultrasound, pleural biopsy;  Surgeon: Dallin Agrawal MD;  Location: [facility] OR    INJECT BLOCK MEDIAL BRANCH CERVICAL/THORACIC/LUMBAR      INSERT CHEST TUBE Right 2022    Procedure: INSERTION, CATHETER, INTERCOSTAL, FOR DRAINAGE;  Surgeon: Dallin Agrawal MD;  Location: [facility] GI    INSERT CHEST TUBE Right 3/9/2022    Procedure: INSERTION, CATHETER, INTERCOSTAL, FOR DRAINAGE;  Surgeon: Sushila Antonio MD;  Location: [facility] GI    IR CHEST TUBE REMOVAL TUNNELED RIGHT  2022    OPTICAL TRACKING SYSTEM CRANIOTOMY, EXCISE TUMOR, COMBINED Left 2022    Procedure: Left stealth craniotomy for tumor resection with motor mapping;  Surgeon: Stephen Moran MD;  Location:  OR    OPTICAL TRACKING SYSTEM CRANIOTOMY, EXCISE TUMOR, COMBINED Right 2023    Procedure: Right stealth craniotomy and resection of tumor;  Surgeon: Stephen Moran MD;  Location:  OR    ORTHOPEDIC SURGERY      Ganesh. Rotator cuff repair.    PLEUROSCOPY N/A 2022    Procedure: Pleuroscopy with Pleural Biopsy;  Surgeon: Dallin Agrawal MD;  Location:  OR       Social History:  Lives with wife and 4 kids in [place]. Works as a  for an apartment complex in [place]. Exposure to household chemicals and . No significant exposure to asbestos. No signal exposure to benzene or similar chemicals. No significant smoking history-states that he smoked 1 to 2 cigarettes occasionally per month for about 2 years in college, non-smoking since then. No significant alcohol use history. No other recreational substances. Good support system. Kids are 23, 19, 17 and 13.    Family History  Significant history for cancers on maternal side. Mother  of uterine cancer. 2 maternal uncles have possible metastatic melanoma.    Outpatient  Medications:  Current Outpatient Medications   Medication    acetaminophen (TYLENOL) 325 MG tablet    albuterol (PROAIR HFA/PROVENTIL HFA/VENTOLIN HFA) 108 (90 Base) MCG/ACT inhaler    Alcohol Swabs PADS    baclofen (LIORESAL) 10 MG tablet    blood glucose (NO BRAND SPECIFIED) lancets standard    blood glucose (NO BRAND SPECIFIED) test strip    blood glucose monitoring (NO BRAND SPECIFIED) meter device kit    brigatinib (ALUNBRIG) 30 MG TABS tablet    brigatinib (ALUNBRIG) 30 MG TABS tablet    citalopram (CELEXA) 20 MG tablet    Continuous Blood Gluc Sensor (FREESTYLE IRENE 2 SENSOR) MISC    cyclobenzaprine (FLEXERIL) 5 MG tablet    dexAMETHasone (DECADRON) 2 MG tablet    hydrochlorothiazide (HYDRODIURIL) 50 MG tablet    insulin aspart (NOVOLOG PEN) 100 UNIT/ML pen    insulin glargine (LANTUS PEN) 100 UNIT/ML pen    insulin pen needle (32G X 4 MM) 32G X 4 MM miscellaneous    lidocaine-prilocaine (EMLA) 2.5-2.5 % external cream    lisinopril (ZESTRIL) 40 MG tablet    methadone (DOLOPHINE) 5 MG tablet    methocarbamol (ROBAXIN) 750 MG tablet    naloxone (NARCAN) 4 MG/0.1ML nasal spray    oxyCODONE IR (ROXICODONE) 10 MG tablet    propranolol (INDERAL) 20 MG tablet    senna-docusate (SENOKOT-S/PERICOLACE) 8.6-50 MG tablet    Sharps Container MISC    STATIN NOT PRESCRIBED (INTENTIONAL)    zolpidem (AMBIEN) 5 MG tablet     No current facility-administered medications for this visit.     PHYSICAL EXAMINATION ATTAINABLE DURING VIDEO VISIT:  CONSTITUTIONAL - Pt looks like stated age, pleasant, not in acute distress. Not obese.  NEURO: Oriented to time, person, and places. No tremor.  ENT, MOUTH: Pupils are equal.  Sclerae are anicteric.  Moist oral mucosa. No oral thrush.   NECK:  No jugular venous distention.  No thyroid enlargement.   RESPIRATORY: talk nl, no sob during conversation, no cough.   MUSCULOSKELETAL/EXTREMITIES:  No edema.  No joint deformity. Normal range of motion  SKIN:  No petechiae.  No rash.  No signs of  cellulitis.   PSYCHIATRIC: Normal mood and affect. Good memory. Proper insight and judgement.       Labs & Studies: I personally reviewed the following studies:  Most Recent 3 CBC's:  Recent Labs   Lab Test 08/22/23  1539 08/16/23  1204 08/10/23  0956   WBC 21.5* 10.1 10.1   HGB 15.6 14.9 13.9   MCV 86 86 88    228 271     Most Recent 3 BMP's:  Recent Labs   Lab Test 08/22/23  1539 08/16/23  1204 08/10/23  0952    140 141   POTASSIUM 3.5 3.4 3.3*   CHLORIDE 95* 99 101   CO2 30* 29 29   BUN 33.4* 12.6 15.9   CR 0.72 0.67 0.60*   ANIONGAP 15 12 11   TORY 10.2* 9.5 9.9   * 170* 223*  223*    Most Recent 2 LFT's:  Recent Labs   Lab Test 08/22/23  1539 08/16/23  1204   AST 17 22   ALT 16 15   ALKPHOS 91 89   BILITOTAL 0.5 0.6    Most Recent TSH and T4:  Recent Labs   Lab Test 09/12/22  1642   TSH 2.56        ASSESSMENT AND PLAN:  Stage IV NSCLC, Rt lung adenocarcinoma with metastasis to pleura, mediastinum , rt pleural effusion and brain diagnosed 1/2022 (AJCC 8th edition)  PD-L1 TPS 2-3% by [place]   NGS [facility] panel-EML4:ALK rearragement; chr2:[id number], chr2:[id number]  NGS Guardant- GNAS R201H, KRAS K5E- No ALK    He began Alectinib 600 mg BID 3/2/22 and unfortunately developed grade 3 myalgias which have improved with lowering the dose 450 mg BID. He was holding drug 4/2/22 to 4/11/22 due to MSSA infection from pleurex which is removed. Resumed at 450 mg BID. Due to ongoing myalgias (Although CK is normal), we dose reduced to 300 mg BID.   In Dec 2022, developed Gr 3 arthralgia, and we stopped drug 12/20/22. Had unremitting arthalgias, eventully had to starte PO steroids which led to improvement and resolved. Radiographicaly, he has had a near CR to Rx in the lung, has a residual rt LL lesion.     Began brigatinib 2/22/23 (delayed due to pt hesitancy). Developed severe cough on day 2 so brigatinib was held and cough resolved with in 24-48 hours. He restarted 60 mg daily and upped it to 90  mg.Restging CT showing stable disease in the lung, however, MRI with several contrast enhancement and increase in size of previously treated lesions. His dose was increased to 180 mg daily to address possible CNS disease progression and started on dexamethasone. 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 CT in [place] 20123 showing stbale disease.   Following surgery, we reduced to 120 mg/day due to worsenign joint aches/stiffness. Restaging CT in 9/2023 showing overall disease stablity with no evidence of active cancer.Will continue Brigatinib at 120 mg and restage in 3 months. THis time will cosndier doing PET/CT to look for any PET avid residual disease, if + can consider rconsolidative SBRT.       Plan  Bevacizumab infusion appts with in next week   RTC with NP/PA prior to Bevacizumab infusions   PET CT in 3 months   RTC with me after PET     # Brain mets:  # Radiation necrosis  Baseline Brain MRI with several brain mets, s/p GK to 11-12 brain mets. F/u Brain MRI in June 2022 was showing enlargement of the one of the lesions along with edema, therefore had to undergo craniotomy followed by resection, the final biopsy consistent with radiation necrosis.  Had issues with radiation necrosis and swelling requiring steroids but these were driving up blood sugars drastically. Was on bevacizumab for radiation necrosis --15 mg/kg every 3 weeks.  S/p 2 doses of Bevacixumab,hold due HTN urgency and PE. MRI in 4/2023 now showing contrast enhancement of lesions and a dominat lesion int he rt frontal regionw ith edema, several other smaller lesion. Short term MRI showing increase in size of the rt frontal lesion, underwent resection, patho again showing radiation necrosis.   Restarted bevacizumab in 07/2923 s/p 2 cycles, dint tolerate cycle 1 well, cycle 2 was better, C2 was delayed due to uncontrolled hTN,   -here for C3, will arrnage for cycle 3 soon, was due for cycle 3  last week  - propanolol to 40 mg TID.  Continue 50 mg of hydrochlorothiazide and lisinopril 40 mg daily.  If needed could consider CCB or hydralazine.   -Check BP twice daily. Educated on risk of stroke due to uncontrolled BP and warning signs of this that warrant immediate care.      - next MRI end of cotober     Headaches- resolved  -Present since recent craniotomy on 6/27/23. MRI brain on 7/27/23 with reduced vasogenic edema. Since starting avasti headaches improved and now resolved 2 weeks ago.         Elevated Lipase- now improved  -Elevated at 101. Not at grade 3 as it's currently <2xULN.      Diabetes, Type 2  Reports improved control. Currently running about 150-170 at home  --on Metformin and insulin          # Grade 3 arthralgias  related to previous alectinib, unremiittign with tylenol, NSAIDs or xocyodoen,- nearly resolved with steroids -All joints affected, no morning stiffness, normal ESRa nd CRP  Now has recrudenscen of this while on Brigatinib but not as severe.  Recently has noticed more fatigue and stifnnes in body  -following with palliative;   -on methadone 2.5 mg in AM and 5 mg at HS; oxycodone 5-10 mg po q 4 hours prn; Celecoxib 100 mg po BID           # PE: provoked by malignancy vs bevacizumab in 11/2022  -New right-sided pleuritic chest pain, tachycardiam, CT showing rt sided sub segmental PE wo   -- on rivaroxiabn 20 mg daily  -     # G3 diastolic HTN - led to one dose hold of bevacizumab   Adviesed pt to got o ER if he has any enurologic symptoms, rxplained risk of stroke or other CV problems.   Pt agreed to monitor BP at home.Recetnt bP at home avg 140/90,   propanolol to 40 mg TID.  Continue 50 mg of hydrochlorothiazide and lisinopril 40 mg daily.  If needed could consider CCB or hydralazine.        # Type 2 Diabetes:   #hyperglycemia: Acute on chronic--prompting last admission. Reports glucose has been stable. Following with endo.  Admission 2 times ofr uncontrolled blood sugar ISelf  monitoring of blood glucose is not at goal of fasting  mg/dL. Last BG . On insulin and metformin 500mg ER every day , hopefully now he is off dex, it should be better.   --Started using Mohan 2 continous glucose monitor  --FOllows medication management-   --on  Metformin and insulin         #normocytic anemia: sudden drop to <7, requiring 1 unit blood transfusion. Lab work up unrevealing. Has recovered to baseline  -consider EGD if hgb drops again, risk for GI bleed with chronic steroid use           #right chest/rib pain---> intermittent- improved  Reproducible and intermittent, worse with movement of torso. Unsure of etiology but we agreed to monitor given mild, possibly msk, and no correlating respiratory or cardiac symptoms.          #MSSA infection, Sepsis at pleurex site- resolved  # Pleural effusion: s/p pleurex palcement  2/16/22. Then on 4/2 right PleurX catheter s/p removal for severe sepsis due to MSSA infection.      #sharp right chest pain:  I suspect this is residual nerve damage from his prior pleurex given its onset and longevity. Hopefully methadone will be helpful for this if it is in fact neuropathic pain.        On the day of service  Chart review: 5 minutes  Visit duration: 30 minutes  Care coordination: 5 minutes    Bassam Persaud MD    Hematology, Oncology and Transplantation

## 2023-09-11 NOTE — TELEPHONE ENCOUNTER
Received [employer] message from patient requesting refill of oxycodone.     Last refill: 8/28/23  Last office visit: 8/14/23  Scheduled for follow up 10/23/23     Will route request to MD for review.     Reviewed [place]  Report.

## 2023-09-12 ENCOUNTER — TELEPHONE (OUTPATIENT)
Dept: ONCOLOGY | Facility: CLINIC | Age: 43
End: 2023-09-12
Payer: MEDICAID

## 2023-09-12 ENCOUNTER — HOSPITAL ENCOUNTER (OUTPATIENT)
Dept: CT IMAGING | Facility: CLINIC | Age: 43
Discharge: HOME | End: 2023-09-12
Attending: STUDENT IN AN ORGANIZED HEALTH CARE EDUCATION/TRAINING PROGRAM | Admitting: STUDENT IN AN ORGANIZED HEALTH CARE EDUCATION/TRAINING PROGRAM
Payer: COMMERCIAL

## 2023-09-12 DIAGNOSIS — I67.89 RADIATION THERAPY INDUCED BRAIN NECROSIS: ICD-10-CM
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
## 2023-09-12 DIAGNOSIS — C34.31 MALIGNANT NEOPLASM OF LOWER LOBE OF RIGHT LUNG (H): ICD-10-CM

## 2023-09-12 PROCEDURE — 250N000009 HC RX 250: Performed by: STUDENT IN AN ORGANIZED HEALTH CARE EDUCATION/TRAINING PROGRAM

## 2023-09-12 PROCEDURE — 71260 CT THORAX DX C+: CPT

## 2023-09-12 PROCEDURE — 250N000011 HC RX IP 250 OP 636: Performed by: STUDENT IN AN ORGANIZED HEALTH CARE EDUCATION/TRAINING PROGRAM

## 2023-09-12 RX ORDER — IOPAMIDOL 755 MG/ML
120 INJECTION, SOLUTION INTRAVASCULAR ONCE
Status: COMPLETED | OUTPATIENT
Start: 2023-09-12 | End: 2023-09-12

## 2023-09-12 RX ADMIN — IOPAMIDOL 100 ML: 755 INJECTION, SOLUTION INTRAVENOUS at 14:52

## 2023-09-12 RX ADMIN — SODIUM CHLORIDE 65 ML: 9 INJECTION, SOLUTION INTRAVENOUS at 14:53

## 2023-09-12 NOTE — TELEPHONE ENCOUNTER
PA Initiation    Medication: ALUNBRIG 30 MG PO TABS  Insurance Company: [facility] - Phone [phone] Fax [phone]  Pharmacy Filling the Rx: [facility] - [place], [place] - [address]  Start Date: 9/12/2023    Juliette Marquez CPhT  [facility]  Oncology Pharmacy Liaison II  [email or web address]  [phone] (phone  [phone] (fax

## 2023-09-13 ENCOUNTER — VIRTUAL VISIT (OUTPATIENT)
Dept: ONCOLOGY | Facility: CLINIC | Age: 43
End: 2023-09-13
Attending: STUDENT IN AN ORGANIZED HEALTH CARE EDUCATION/TRAINING PROGRAM
Payer: COMMERCIAL

## 2023-09-13 VITALS — WEIGHT: 217 LBS | BODY MASS INDEX: 32.14 KG/M2 | HEIGHT: 69 IN

## 2023-09-13 DIAGNOSIS — C34.31 MALIGNANT NEOPLASM OF LOWER LOBE OF RIGHT LUNG (H): Primary | ICD-10-CM

## 2023-09-13 DIAGNOSIS — I67.89 RADIATION THERAPY INDUCED BRAIN NECROSIS: ICD-10-CM
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
## 2023-09-13 PROCEDURE — 99215 OFFICE O/P EST HI 40 MIN: CPT | Mod: 95 | Performed by: STUDENT IN AN ORGANIZED HEALTH CARE EDUCATION/TRAINING PROGRAM

## 2023-09-13 RX ORDER — ALBUTEROL SULFATE 0.83 MG/ML
2.5 SOLUTION RESPIRATORY (INHALATION)
Status: CANCELLED | OUTPATIENT
Start: 2023-09-15

## 2023-09-13 RX ORDER — ALBUTEROL SULFATE 90 UG/1
1-2 AEROSOL, METERED RESPIRATORY (INHALATION)
Status: CANCELLED
Start: 2023-09-15

## 2023-09-13 RX ORDER — LORAZEPAM 2 MG/ML
0.5 INJECTION INTRAMUSCULAR EVERY 4 HOURS PRN
Status: CANCELLED | OUTPATIENT
Start: 2023-09-15

## 2023-09-13 RX ORDER — HEPARIN SODIUM,PORCINE 10 UNIT/ML
5-20 VIAL (ML) INTRAVENOUS DAILY PRN
Status: CANCELLED | OUTPATIENT
Start: 2023-09-15

## 2023-09-13 RX ORDER — METHYLPREDNISOLONE SODIUM SUCCINATE 125 MG/2ML
125 INJECTION, POWDER, LYOPHILIZED, FOR SOLUTION INTRAMUSCULAR; INTRAVENOUS
Status: CANCELLED
Start: 2023-09-15

## 2023-09-13 RX ORDER — EPINEPHRINE 1 MG/ML
0.3 INJECTION, SOLUTION INTRAMUSCULAR; SUBCUTANEOUS EVERY 5 MIN PRN
Status: CANCELLED | OUTPATIENT
Start: 2023-09-15

## 2023-09-13 RX ORDER — MEPERIDINE HYDROCHLORIDE 25 MG/ML
25 INJECTION INTRAMUSCULAR; INTRAVENOUS; SUBCUTANEOUS EVERY 30 MIN PRN
Status: CANCELLED | OUTPATIENT
Start: 2023-09-15

## 2023-09-13 RX ORDER — HEPARIN SODIUM (PORCINE) LOCK FLUSH IV SOLN 100 UNIT/ML 100 UNIT/ML
5 SOLUTION INTRAVENOUS
Status: CANCELLED | OUTPATIENT
Start: 2023-09-15

## 2023-09-13 RX ORDER — DIPHENHYDRAMINE HYDROCHLORIDE 50 MG/ML
50 INJECTION INTRAMUSCULAR; INTRAVENOUS
Status: CANCELLED
Start: 2023-09-15

## 2023-09-13 ASSESSMENT — PAIN SCALES - GENERAL: PAINLEVEL: NO PAIN (0)

## 2023-09-13 NOTE — LETTER
9/13/2023         RE: Connor Emerson  [address]  [facility] [address]        Dear Colleague,    Thank you for referring your patient, Connor Emerson, to the [facility]. Please see a copy of my visit note below.    Virtual Visit Details    Type of service:  Video Visit   Video Start Time: 3:23 PM  Video End Time:3:23 PM    Originating Location (pt. Location): Home    Distant Location (provider location):  On-site  Platform used for Video Visit: [facility] ONCOLOGY FOLLOW UP NOTE    PATIENT NAME: Connor Emerson  ENCOUNTER DATE: 9/13/2023    Care Team  Primary Oncologist: Bassam Persaud MD    REASON FOR CURRENT VISIT: F/u of lung cancer    HISTORY OF PRESENT ILLNESS:  Mr. Connor Emerson is a 42 year old  male who is a non-smoker with PMHx of T2DM, HTN with metastatic NSCLC comes for follow up     Oncologic Hx:    Diagnosis:   Stage IV NSCLC, Rt lung adenocarcinoma with metastasis to pleura, mediastinum , rt pleural effusion and brain diagnosed 1/2022 (AJCC 8th edition)  PD-L1 TPS 2-3% by [place]   NGS [facility] panel-EML4:ALK rearragement  NGS Guardant- GNAS R201H, KRAS K5E- No ALK    Treatment:    2/23/2022- current: Brigatinib. Dose reduced to 60 mg due to cough after two days of 90 mg daily -->back on 90 mg---> increased to 180 mg     7/20/23- Bevacizumab for radiation necrosis    3/2/22- 12/2022 Alectinib 300 mg BID (Dose reduced to 450 mg BID from 600 mg BID due to grade 3 myalgias 3/21/22, again reduced to 300 mg BID 9/28/22)    )  Past:  2/15/22- GK to 11 brain lesions  6/28/22- Craniotomy, resection  9/28/22-10/26- Bevacizumab for radiation necrosis (stopped due to PE)      Intent of treatment: Palliative    Oncologic course:  1/19/22 to 1/22/22-Admitted to [facility] for 2 week progressive SOB secondary to have large rt sided pleural effusion, needing thoracentesis x2 (1.7L and 2.0 L removed), cytology positive for malignancy, adenocarcinoma.   1/26/22- Rt pleural  mass biopsy-Dr. Agrawal--POSITIVE FOR ADENOCARCINOMA CONSISTENT WITH LUNG PRIMARY, admixed with mesothelial hyperplasia and inflammatory infiltrate (+ TTF-1 and CK 7;  negative  p40, calretinin and WT-1. PAX8 immunostain focal +). 4th thoracentesis done simultaneously - 3L approx removed.   2/1/22- PET/CT-Right lower lobe central infiltrative FDG avid 8.2 x 9.6 cm mass representing a primary lung adenocarcinoma. Ipsilateral right perihilar, bilateral pretracheal, subcarinal and superior mediastinal michele metastases. Contralateral mildly FDG avid few lung nodules are suspicious for contralateral metastasis. At least 3 intracranial metastases in the right frontal lobe, left frontal lobe and left cerebellar hemisphere. Nonspecific mild diffuse bone marrow uptake. Further evaluation with a spine MRI could be considered to rule out early marrow infiltration. This could also be seen with red marrow conversion.  2/5/22-  Brain MRI- At least 9 intracranial metastases as detailed above. The dominant lesions involving the orbital right frontal lobe, the posterior left middle frontal gyrus, anterior right temporal lobe and in the left cerebellar hemisphere have surrounding moderate vasogenic type edema.  2/15/22- Saw Dr. Arango from Rad Onc- Rcd GK to 12 lesion in bran  2/16/22- Pleurex placement   3/2/22- Started Alectinib 600 mg BID  3/21/22- Dose reduced to 450 mg BID due to grade 3 myalgias and fatigue  4/2/22 to 4/5/22- Admitted at [facility] for- Severe sepsis due to MSSA infection of right PleurX catheter s/p removal- He presented with onset of pain at tube site starting 4/1; at arrival was tachycardic with leukocytosis (22.7) and elevated lactic acid (2.9).  CT chest showed fluid and stranding tracking outside the pleural space into chest wall along pleural catheter.  IR was consulted and removed catheter 4/2 with report of pustular drainage and tip culture growing MSSA.  Thoracic Surg was consulted who felt no surgical  indication necessary given minimal pleural fluid and lack of any signs of abscess.  Initially treated with broad spectrum coverage for sepsis, narrowed to Ancef once sensitivities returned with plan to transition to cefadroxil for an additional 10 days at discharge per ID. Held drug 4/2 to 4/11 5/2/22- CT CAP- Overall, positive response to therapy with decreased size of right lower lobe and right pleural-based masses, pulmonary metastases, hilar and mediastinal lymphadenopathy. However, a single right posterior pleural-based mass has slightly increased in size since 2/24/2022. No metastatic disease in the abdomen and pelvis. Right Pleurx catheter has been removed. Trace right pleural effusion and right basilar atelectasis.  5/2/22- Brain MRI- The previously demonstrated brain metastases are mildly diminished in size versus to 2/5/2022. The degree of edema is also diminished but not completely resolved. Probable trace amounts of intralesional bleeding demonstrated on the gradient sequence within the metastases. No definite new metastasis or progressive mass effect. No hydrocephalus or infarct.    6/15/22 to 6/17/22- Admitted at [facility]-with aphasia and word finding difficulty over last few weeks.  He presented to [facility] ED on 6/10 for evaluation of his symptoms. MRI brain showed multiple intracranial metastases, with interval enlargement of the dominant lesion within the left frontal lobe and increased surrounding vasogenic edema with 2 mm rightward shift of the septum pellucidum. Due to his worsening anxiety, he left AMA. His symptoms continued to progress to where he could not write at work so he decided to go to the ED for re-evaluation and treatment. Evaluated by JATINDER, Rad Onc (radiaiton necrosis vs tumor progression).  6/16/22- MR Brain (6/16) shows multiple intracranial metastases, with interval enlargement  of the dominant lesion within the left frontal lobe and increased surrounding vasogenic edema with 2 mm  rightward shift of the septum pellucidum.  6/16/22- - CT CAP shows slightly decreased size of right lower lobe and right pleural-based masses. No new pulmonary nodules or lymphadenopathy; No evidence of metastatic disease in the abdomen or pelvis.   6/28/22 to 6/30/22- Admitted at [facility]- Elective left Stealth craniotomy with resection of brain tumor due to ongoing symptoms. No intraoperative complications. EBL 50 ml.  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  7/5/22- Ct CAP- Right lower lobe low-density nodules are not significantly changed. A small left upper lobe pulmonary nodule is also unchanged. Trace pleural fluid on the right has increased slightly. No convincing evidence for metastatic disease in the abdomen or pelvis.  7/18/22 to 7/19/22- Admitted to [facility] for seizure- Reportedly was only taking once Keppra instead of twice daily. Also resumed on dexamethasone 2 mg daily  8/1/22- Brain MRI- Redemonstrated postsurgical changes status post left frontoparietal Craniotomy. Interval increase in size of the dominant ring-enhancing lesion in the left posterior superior frontal lobe with increased moderate surrounding vasogenic edema and local mass effect resulting in narrowing of the supratentorial ventricular system. No significant midline shift/herniation at this time    8/1/22- Dex was increased to 4 mg daily by Dr. Moran    9/1/22- CT Chest- Near resolution of previously seen right pleural nodule. Stable right lower lobe pulmonary nodule    9/28/22- Bevacizumab for radiation necrosis    10/26/22- Bevacizumab     10/26/22- Ct CAP- Stable posterior medial right lower lobe 1.9 x 1.1 cm nodule series 8 image 176. Adjacent stable scarring and atelectasis. The previously noted pleural nodule posteriorly on the right is not currently clearly identified. Stable left upper lobe 0.3 cm nodule image 56    10/26/22- Brain MRI- Overall improved appearance of multiple intracranial metastases with near resolution  of associated edema and diminished enhancement and size of multiple residual lesions. No definite new or progressive metastasis.    11/7/22 to 11/9/22- Admitted for PE and HTN urgency- Small pulmonary embolism in the right lower lobe pulmonary artery. started on Lovenox, Brain MRI neg for PRES.    12/29/22- ED visit- bilateral hip pain, pain in shoulders, knuckles, knees, and ankles- holding alectinib since 12/20/22 1/6/23- Ct CAP- Mild groundglass nodularity in the left upper lobe is new since the previous exam, and may be infectious in etiology. No other significant interval change. Pulmonary nodules are not significantly changed.    1/6/23- Brain MRI- Stable to diminished sequelae of intracranial metastasis and treatment changes. No new or progressive metastasis. No superimposed acute intracranial finding.     2/23/2022- Start Brigatinib. Dose reduced to 60 mg due to cough after two days of 90 mg daily -  3/23/23-Brigatinib  (increase to 90 mg)    4/20/23- CT CAP- Stable- Previously noted mild groundglass nodularity in the left upper lobe has resolved.Pulmonary nodules are unchanged.Trace amount pleural fluid on the right has decreased slightly.    4/20/23- Brain MRI- Possile progression- Largest metastasis within the right frontal lobe has increased in size with worsening peripheral nodular enhancement and worsening vasogenic edema contributing to new right to left midline shift.  Multiple new/enlarging metastases scattered throughout the cerebral hemispheres and cerebellum. Started on dexamethasone by NGS on 4/28/23 5/17/23- presents to clinic with glucose 627, admission to hospital for stability on insulin drip    6/16/23- Brain MRI- Interval increase in size of the necrotic lesion in the anterior aspect of the right frontal lobe from 2.4 x 2.3 x 2.4 cm previously to 3.0 x 3.5 x 2.8 cm on the current study. Mass effect due to slightly increased vasogenic edema surrounding the right frontal lesion  "resulted in increase of leftward midline shift of the anterior interhemispheric fissure from 0.7 cm previously to 0.9 cm currently. Interval increase in size in two adjacent metastases at the frontoparietal junction on the left. The remaining scattered intra-axial enhancing nodules are not changed appreciably in size or appearance since the comparison study.No evidence for acute intracranial pathology.   Dr. Moran- Due to worsening headaches and more problems with walking. Recommended a right Stealth craniotomy for resection of the lesion.     6/27/23- Right stealth craniotomy and resection of tumor: Final path: radiation necrosis    7/10/23- Ct CAP- stable Stable exam without evidence for new disease.Stable pulmonary nodules including a dominant nodular opacity at the right lower lobe.    7/20/23- Bevacizumab for radiation necrosis    8/16/23- Bevacizumab     9/12/23- Ct CAP-  Stable right lower lobe dominant nodular opacity. No new disease in the chest, abdomen and pelvis.       He works as a maintenance manger for apartment complexes    Interval Hx:  -Connor is on a video visit to discuss CT results. Currently taking 120 mg of brigatinib.  Aches/stiffness in joints getting little worse.  Has gotne 2 bevacizumab infusions so far, 1st one made him sick Had nasuea, vomitig and addomnila issues  Dint have any issues with 2nd infusion  BP at home are \"better\", 140-90's on average, he has been taking the propanolol 40mg BID and hydrochlorothiazide to 50mg.   Headaches have resolved 2 weeks ago  Vision problames also resolved  No dysarthric speech      ECOG PS 0-1    REVIEW OF SYSTEMS: 14 point ROS negative other than the symptoms noted above in the HPI.    Wt Readings from Last 4 Encounters:   08/22/23 98.4 kg (217 lb)   08/16/23 98.7 kg (217 lb 9.6 oz)   08/14/23 98.4 kg (217 lb)   08/10/23 98.5 kg (217 lb 1.6 oz)      Review of Systems:  A comprehensive ROS was performed and found to be negative or non-contributory " with the exception of that noted in the HPI above.    Past Medical History:  GERD  Hypertension, not on medication  Type 2 diabetes mellitus, not on medications currently, previously on Metformin    Past Surgical History:  Past Surgical History:   Procedure Laterality Date    BRONCHOSCOPY RIGID OR FLEXIBLE W/TRANSENDOSCOPIC ENDOBRONCHIAL ULTRASOUND GUIDED Bilateral 1/26/2022    Procedure: Right BRONCHOSCOPY, FIBEROPTIC, endobronchial ultrasound, pleural biopsy;  Surgeon: Dallin Agrawal MD;  Location: [facility] OR    INJECT BLOCK MEDIAL BRANCH CERVICAL/THORACIC/LUMBAR      INSERT CHEST TUBE Right 2/16/2022    Procedure: INSERTION, CATHETER, INTERCOSTAL, FOR DRAINAGE;  Surgeon: Dallin Agrawal MD;  Location: [facility] GI    INSERT CHEST TUBE Right 3/9/2022    Procedure: INSERTION, CATHETER, INTERCOSTAL, FOR DRAINAGE;  Surgeon: Sushila Antonio MD;  Location: [facility] GI    IR CHEST TUBE REMOVAL TUNNELED RIGHT  4/2/2022    OPTICAL TRACKING SYSTEM CRANIOTOMY, EXCISE TUMOR, COMBINED Left 6/28/2022    Procedure: Left stealth craniotomy for tumor resection with motor mapping;  Surgeon: Stephne Moran MD;  Location:  OR    OPTICAL TRACKING SYSTEM CRANIOTOMY, EXCISE TUMOR, COMBINED Right 6/27/2023    Procedure: Right stealth craniotomy and resection of tumor;  Surgeon: Stephen Moran MD;  Location:  OR    ORTHOPEDIC SURGERY      Ganesh. Rotator cuff repair.    PLEUROSCOPY N/A 1/26/2022    Procedure: Pleuroscopy with Pleural Biopsy;  Surgeon: Dallin Agrawal MD;  Location:  OR       Social History:  Lives with wife and 4 kids in [place]. Works as a  for an apartment complex in [place]. Exposure to household chemicals and . No significant exposure to asbestos. No signal exposure to benzene or similar chemicals. No significant smoking history-states that he smoked 1 to 2 cigarettes occasionally per month for about 2 years in college, non-smoking since then. No significant alcohol use history. No  other recreational substances. Good support system. Kids are 23, 19, 17 and 13.    Family History  Significant history for cancers on maternal side. Mother  of uterine cancer. 2 maternal uncles have possible metastatic melanoma.    Outpatient Medications:  Current Outpatient Medications   Medication    acetaminophen (TYLENOL) 325 MG tablet    albuterol (PROAIR HFA/PROVENTIL HFA/VENTOLIN HFA) 108 (90 Base) MCG/ACT inhaler    Alcohol Swabs PADS    baclofen (LIORESAL) 10 MG tablet    blood glucose (NO BRAND SPECIFIED) lancets standard    blood glucose (NO BRAND SPECIFIED) test strip    blood glucose monitoring (NO BRAND SPECIFIED) meter device kit    brigatinib (ALUNBRIG) 30 MG TABS tablet    brigatinib (ALUNBRIG) 30 MG TABS tablet    citalopram (CELEXA) 20 MG tablet    Continuous Blood Gluc Sensor (FREESTYLE IRENE 2 SENSOR) MISC    cyclobenzaprine (FLEXERIL) 5 MG tablet    dexAMETHasone (DECADRON) 2 MG tablet    hydrochlorothiazide (HYDRODIURIL) 50 MG tablet    insulin aspart (NOVOLOG PEN) 100 UNIT/ML pen    insulin glargine (LANTUS PEN) 100 UNIT/ML pen    insulin pen needle (32G X 4 MM) 32G X 4 MM miscellaneous    lidocaine-prilocaine (EMLA) 2.5-2.5 % external cream    lisinopril (ZESTRIL) 40 MG tablet    methadone (DOLOPHINE) 5 MG tablet    methocarbamol (ROBAXIN) 750 MG tablet    naloxone (NARCAN) 4 MG/0.1ML nasal spray    oxyCODONE IR (ROXICODONE) 10 MG tablet    propranolol (INDERAL) 20 MG tablet    senna-docusate (SENOKOT-S/PERICOLACE) 8.6-50 MG tablet    Sharps Container MISC    STATIN NOT PRESCRIBED (INTENTIONAL)    zolpidem (AMBIEN) 5 MG tablet     No current facility-administered medications for this visit.     PHYSICAL EXAMINATION ATTAINABLE DURING VIDEO VISIT:  CONSTITUTIONAL - Pt looks like stated age, pleasant, not in acute distress. Not obese.  NEURO: Oriented to time, person, and places. No tremor.  ENT, MOUTH: Pupils are equal.  Sclerae are anicteric.  Moist oral mucosa. No oral thrush.   NECK:   No jugular venous distention.  No thyroid enlargement.   RESPIRATORY: talk nl, no sob during conversation, no cough.   MUSCULOSKELETAL/EXTREMITIES:  No edema.  No joint deformity. Normal range of motion  SKIN:  No petechiae.  No rash.  No signs of cellulitis.   PSYCHIATRIC: Normal mood and affect. Good memory. Proper insight and judgement.       Labs & Studies: I personally reviewed the following studies:  Most Recent 3 CBC's:  Recent Labs   Lab Test 08/22/23  1539 08/16/23  1204 08/10/23  0956   WBC 21.5* 10.1 10.1   HGB 15.6 14.9 13.9   MCV 86 86 88    228 271     Most Recent 3 BMP's:  Recent Labs   Lab Test 08/22/23  1539 08/16/23  1204 08/10/23  0952    140 141   POTASSIUM 3.5 3.4 3.3*   CHLORIDE 95* 99 101   CO2 30* 29 29   BUN 33.4* 12.6 15.9   CR 0.72 0.67 0.60*   ANIONGAP 15 12 11   TORY 10.2* 9.5 9.9   * 170* 223*  223*    Most Recent 2 LFT's:  Recent Labs   Lab Test 08/22/23  1539 08/16/23  1204   AST 17 22   ALT 16 15   ALKPHOS 91 89   BILITOTAL 0.5 0.6    Most Recent TSH and T4:  Recent Labs   Lab Test 09/12/22  1642   TSH 2.56        ASSESSMENT AND PLAN:  Stage IV NSCLC, Rt lung adenocarcinoma with metastasis to pleura, mediastinum , rt pleural effusion and brain diagnosed 1/2022 (AJCC 8th edition)  PD-L1 TPS 2-3% by [place]   NGS [facility] panel-EML4:ALK rearragement; chr2:[id number], chr2:[id number]  NGS Guard[facility]- GNAS R201H, KRAS K5E- No ALK    He began Alectinib 600 mg BID 3/2/22 and unfortunately developed grade 3 myalgias which have improved with lowering the dose 450 mg BID. He was holding drug 4/2/22 to 4/11/22 due to MSSA infection from pleurex which is removed. Resumed at 450 mg BID. Due to ongoing myalgias (Although CK is normal), we dose reduced to 300 mg BID.   In Dec 2022, developed Gr 3 arthralgia, and we stopped drug 12/20/22. Had unremitting arthalgias, eventully had to starte PO steroids which led to improvement and resolved. Radiographicaly, he has had a near CR to Rx  in the lung, has a residual rt LL lesion.     Began brigatinib 2/22/23 (delayed due to pt hesitancy). Developed severe cough on day 2 so brigatinib was held and cough resolved with in 24-48 hours. He restarted 60 mg daily and upped it to 90 mg.Restging CT showing stable disease in the lung, however, MRI with several contrast enhancement and increase in size of previously treated lesions. His dose was increased to 180 mg daily to address possible CNS disease progression and started on dexamethasone. 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 CT in [place] 20123 showing stbale disease.   Following surgery, we reduced to 120 mg/day due to worsenign joint aches/stiffness. Restaging CT in 9/2023 showing overall disease stablity with no evidence of active cancer.Will continue Brigatinib at 120 mg and restage in 3 months. THis time will cosndier doing PET/CT to look for any PET avid residual disease, if + can consider rconsolidative SBRT.       Plan  Bevacizumab infusion appts with in next week   RTC with NP/PA prior to Bevacizumab infusions   PET CT in 3 months   RTC with me after PET     # Brain mets:  # Radiation necrosis  Baseline Brain MRI with several brain mets, s/p GK to 11-12 brain mets. F/u Brain MRI in June 2022 was showing enlargement of the one of the lesions along with edema, therefore had to undergo craniotomy followed by resection, the final biopsy consistent with radiation necrosis.  Had issues with radiation necrosis and swelling requiring steroids but these were driving up blood sugars drastically. Was on bevacizumab for radiation necrosis --15 mg/kg every 3 weeks.  S/p 2 doses of Bevacixumab,hold due HTN urgency and PE. MRI in 4/2023 now showing contrast enhancement of lesions and a dominat lesion int he rt frontal regionw ith edema, several other smaller lesion. Short term MRI showing increase in size of the rt frontal lesion, underwent resection,  patho again showing radiation necrosis.   Restarted bevacizumab in 07/2923 s/p 2 cycles, dint tolerate cycle 1 well, cycle 2 was better, C2 was delayed due to uncontrolled hTN,   -here for C3, will arrnage for cycle 3 soon, was due for cycle 3 last week  - propanolol to 40 mg TID.  Continue 50 mg of hydrochlorothiazide and lisinopril 40 mg daily.  If needed could consider CCB or hydralazine.   -Check BP twice daily. Educated on risk of stroke due to uncontrolled BP and warning signs of this that warrant immediate care.      - next MRI end of cotober     Headaches- resolved  -Present since recent craniotomy on 6/27/23. MRI brain on 7/27/23 with reduced vasogenic edema. Since starting avasti headaches improved and now resolved 2 weeks ago.         Elevated Lipase- now improved  -Elevated at 101. Not at grade 3 as it's currently <2xULN.      Diabetes, Type 2  Reports improved control. Currently running about 150-170 at home  --on Metformin and insulin          # Grade 3 arthralgias  related to previous alectinib, unremiittign with tylenol, NSAIDs or xocyodoen,- nearly resolved with steroids -All joints affected, no morning stiffness, normal ESRa nd CRP  Now has recrudenscen of this while on Brigatinib but not as severe.  Recently has noticed more fatigue and stifnnes in body  -following with palliative;   -on methadone 2.5 mg in AM and 5 mg at HS; oxycodone 5-10 mg po q 4 hours prn; Celecoxib 100 mg po BID           # PE: provoked by malignancy vs bevacizumab in 11/2022  -New right-sided pleuritic chest pain, tachycardiam, CT showing rt sided sub segmental PE wo   -- on rivaroxiabn 20 mg daily  -     # G3 diastolic HTN - led to one dose hold of bevacizumab   Adviesed pt to got o ER if he has any enurologic symptoms, rxplained risk of stroke or other CV problems.   Pt agreed to monitor BP at home.Recetnt bP at home avg 140/90,   propanolol to 40 mg TID.  Continue 50 mg of hydrochlorothiazide and lisinopril 40 mg daily.   If needed could consider CCB or hydralazine.        # Type 2 Diabetes:   #hyperglycemia: Acute on chronic--prompting last admission. Reports glucose has been stable. Following with endo.  Admission 2 times ofr uncontrolled blood sugar ISelf monitoring of blood glucose is not at goal of fasting  mg/dL. Last BG . On insulin and metformin 500mg ER every day , hopefully now he is off dex, it should be better.   --Started using [employer] 2 continous glucose monitor  --FOllows medication management-   --on  Metformin and insulin         #normocytic anemia: sudden drop to <7, requiring 1 unit blood transfusion. Lab work up unrevealing. Has recovered to baseline  -consider EGD if hgb drops again, risk for GI bleed with chronic steroid use           #right chest/rib pain---> intermittent- improved  Reproducible and intermittent, worse with movement of torso. Unsure of etiology but we agreed to monitor given mild, possibly msk, and no correlating respiratory or cardiac symptoms.          #MSSA infection, Sepsis at pleurex site- resolved  # Pleural effusion: s/p pleurex palcement  2/16/22. Then on 4/2 right PleurX catheter s/p removal for severe sepsis due to MSSA infection.      #sharp right chest pain:  I suspect this is residual nerve damage from his prior pleurex given its onset and longevity. Hopefully methadone will be helpful for this if it is in fact neuropathic pain.        On the day of service  Chart review: 5 minutes  Visit duration: 30 minutes  Care coordination: 5 minutes    Bassam Persaud MD    Hematology, Oncology and Transplantation

## 2023-09-13 NOTE — NURSING NOTE
Is the patient currently in the state of [place]? YES    Visit mode:VIDEO    If the visit is dropped, the patient can be reconnected by: VIDEO VISIT: Text to cell phone:   Telephone Information:   Mobile [phone]       Will anyone else be joining the visit? NO  (If patient encounters technical issues they should call [phone])    How would you like to obtain your AVS? MyChart    Are changes needed to the allergy or medication list? No    Reason for visit: RECHECK    Amanda OCONNOR

## 2023-09-13 NOTE — TELEPHONE ENCOUNTER
Prior Authorization Approval    Medication: ALUNBRIG 30 MG PO TABS  Authorization Effective Date: 1/15/2024-  Authorization Expiration Date: 1/15/2025  Approved Dose/Quantity: 120/30  Reference #: [id number]   Insurance Company: [facility] - Phone [phone] Fax [phone]  Which Pharmacy is filling the prescription: [facility] - [place], [place] - [address] C[address]  [facility]  Oncology Pharmacy Liaison II  [email or web address]  [phone] (phone  [phone] (fax

## 2023-09-15 ENCOUNTER — INFUSION THERAPY VISIT (OUTPATIENT)
Dept: ONCOLOGY | Facility: CLINIC | Age: 43
End: 2023-09-15
Attending: STUDENT IN AN ORGANIZED HEALTH CARE EDUCATION/TRAINING PROGRAM
Payer: COMMERCIAL

## 2023-09-15 ENCOUNTER — APPOINTMENT (OUTPATIENT)
Dept: LAB | Facility: CLINIC | Age: 43
End: 2023-09-15
Attending: STUDENT IN AN ORGANIZED HEALTH CARE EDUCATION/TRAINING PROGRAM
Payer: COMMERCIAL

## 2023-09-15 VITALS
BODY MASS INDEX: 32 KG/M2 | WEIGHT: 216.7 LBS | DIASTOLIC BLOOD PRESSURE: 92 MMHG | SYSTOLIC BLOOD PRESSURE: 125 MMHG | OXYGEN SATURATION: 97 % | RESPIRATION RATE: 16 BRPM | HEART RATE: 57 BPM | TEMPERATURE: 98.6 F

## 2023-09-15 DIAGNOSIS — C79.31 MALIGNANT NEOPLASM METASTATIC TO BRAIN (H): ICD-10-CM

## 2023-09-15 DIAGNOSIS — C34.90 NON-SMALL CELL LUNG CANCER (NSCLC) (H): ICD-10-CM

## 2023-09-15 DIAGNOSIS — Z79.899 ENCOUNTER FOR LONG-TERM (CURRENT) USE OF MEDICATIONS: ICD-10-CM

## 2023-09-15 DIAGNOSIS — C34.31 MALIGNANT NEOPLASM OF LOWER LOBE OF RIGHT LUNG (H): Primary | ICD-10-CM
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
## 2023-09-15 DIAGNOSIS — I67.89 RADIATION THERAPY INDUCED BRAIN NECROSIS: ICD-10-CM

## 2023-09-15 LAB
ALBUMIN SERPL BCG-MCNC: 4.5 G/DL (ref 3.5–5.2)
ALBUMIN UR-MCNC: 50 MG/DL
ALP SERPL-CCNC: 78 U/L (ref 40–129)
ALT SERPL W P-5'-P-CCNC: 15 U/L (ref 0–70)
AMYLASE SERPL-CCNC: 98 U/L (ref 28–100)
ANION GAP SERPL CALCULATED.3IONS-SCNC: 11 MMOL/L (ref 7–15)
AST SERPL W P-5'-P-CCNC: 20 U/L (ref 0–45)
BASOPHILS # BLD AUTO: 0.1 10E3/UL (ref 0–0.2)
BASOPHILS NFR BLD AUTO: 1 %
BILIRUB SERPL-MCNC: 0.4 MG/DL
BUN SERPL-MCNC: 21.3 MG/DL (ref 6–20)
CALCIUM SERPL-MCNC: 9.9 MG/DL (ref 8.6–10)
CHLORIDE SERPL-SCNC: 102 MMOL/L (ref 98–107)
CK SERPL-CCNC: 113 U/L (ref 39–308)
CREAT SERPL-MCNC: 0.78 MG/DL (ref 0.67–1.17)
DEPRECATED HCO3 PLAS-SCNC: 31 MMOL/L (ref 22–29)
EGFRCR SERPLBLD CKD-EPI 2021: >90 ML/MIN/1.73M2
EOSINOPHIL # BLD AUTO: 0.8 10E3/UL (ref 0–0.7)
EOSINOPHIL NFR BLD AUTO: 8 %
ERYTHROCYTE [DISTWIDTH] IN BLOOD BY AUTOMATED COUNT: 13.6 % (ref 10–15)
GLUCOSE SERPL-MCNC: 124 MG/DL (ref 70–99)
HCT VFR BLD AUTO: 40.1 % (ref 40–53)
HGB BLD-MCNC: 14.2 G/DL (ref 13.3–17.7)
IMM GRANULOCYTES # BLD: 0 10E3/UL
IMM GRANULOCYTES NFR BLD: 0 %
LIPASE SERPL-CCNC: 105 U/L (ref 13–60)
LYMPHOCYTES # BLD AUTO: 3.3 10E3/UL (ref 0.8–5.3)
LYMPHOCYTES NFR BLD AUTO: 33 %
MAGNESIUM SERPL-MCNC: 1.9 MG/DL (ref 1.7–2.3)
MCH RBC QN AUTO: 31 PG (ref 26.5–33)
MCHC RBC AUTO-ENTMCNC: 35.4 G/DL (ref 31.5–36.5)
MCV RBC AUTO: 88 FL (ref 78–100)
MONOCYTES # BLD AUTO: 0.8 10E3/UL (ref 0–1.3)
MONOCYTES NFR BLD AUTO: 8 %
NEUTROPHILS # BLD AUTO: 5 10E3/UL (ref 1.6–8.3)
NEUTROPHILS NFR BLD AUTO: 50 %
NRBC # BLD AUTO: 0 10E3/UL
NRBC BLD AUTO-RTO: 0 /100
PHOSPHATE SERPL-MCNC: 4.3 MG/DL (ref 2.5–4.5)
PLATELET # BLD AUTO: 250 10E3/UL (ref 150–450)
POTASSIUM SERPL-SCNC: 3.8 MMOL/L (ref 3.4–5.3)
PROT SERPL-MCNC: 6.9 G/DL (ref 6.4–8.3)
RBC # BLD AUTO: 4.58 10E6/UL (ref 4.4–5.9)
SODIUM SERPL-SCNC: 144 MMOL/L (ref 136–145)
WBC # BLD AUTO: 9.9 10E3/UL (ref 4–11)

## 2023-09-15 PROCEDURE — 250N000011 HC RX IP 250 OP 636: Mod: JZ | Performed by: STUDENT IN AN ORGANIZED HEALTH CARE EDUCATION/TRAINING PROGRAM

## 2023-09-15 PROCEDURE — 82150 ASSAY OF AMYLASE: CPT

## 2023-09-15 PROCEDURE — 96413 CHEMO IV INFUSION 1 HR: CPT

## 2023-09-15 PROCEDURE — 258N000003 HC RX IP 258 OP 636: Performed by: STUDENT IN AN ORGANIZED HEALTH CARE EDUCATION/TRAINING PROGRAM

## 2023-09-15 PROCEDURE — 83690 ASSAY OF LIPASE: CPT

## 2023-09-15 PROCEDURE — 83735 ASSAY OF MAGNESIUM: CPT | Performed by: STUDENT IN AN ORGANIZED HEALTH CARE EDUCATION/TRAINING PROGRAM

## 2023-09-15 PROCEDURE — 36415 COLL VENOUS BLD VENIPUNCTURE: CPT

## 2023-09-15 PROCEDURE — 82550 ASSAY OF CK (CPK): CPT

## 2023-09-15 PROCEDURE — 81003 URINALYSIS AUTO W/O SCOPE: CPT | Performed by: STUDENT IN AN ORGANIZED HEALTH CARE EDUCATION/TRAINING PROGRAM

## 2023-09-15 PROCEDURE — 85025 COMPLETE CBC W/AUTO DIFF WBC: CPT

## 2023-09-15 PROCEDURE — 80053 COMPREHEN METABOLIC PANEL: CPT

## 2023-09-15 PROCEDURE — 84100 ASSAY OF PHOSPHORUS: CPT

## 2023-09-15 RX ADMIN — SODIUM CHLORIDE 250 ML: 9 INJECTION, SOLUTION INTRAVENOUS at 13:53

## 2023-09-15 RX ADMIN — BEVACIZUMAB 1500 MG: 400 INJECTION, SOLUTION INTRAVENOUS at 13:53

## 2023-09-15 ASSESSMENT — PAIN SCALES - GENERAL: PAINLEVEL: NO PAIN (0)

## 2023-09-15 NOTE — PROGRESS NOTES
Infusion Nursing Note:  Connor Emerson presents today for C3D1 Avastin.    Patient seen by provider today: No   present during visit today: Not Applicable.    Note: Connor had a visit with Dr. Persaud on Wednesday. He denied any additional questions, concerns, or changes since then. Denied any signs/symptoms of bleeding.     Urine protein elevated to 50 today. Per treatment plan, patient will need to complete 24 hour urine sample prior to next cycle. Provided patient with urine collection kit and instructions for collection. Patient verbalized understanding. He does work during the week, so he will plan to complete collection over the weekend prior to his next infusion.       Intravenous Access:  Peripheral IV placed.    Treatment Conditions:   Latest Reference Range & Units 09/15/23 12:33   Sodium 136 - 145 mmol/L 144   Potassium 3.4 - 5.3 mmol/L 3.8   Chloride 98 - 107 mmol/L 102   Carbon Dioxide (CO2) 22 - 29 mmol/L 31 (H)   Urea Nitrogen 6.0 - 20.0 mg/dL 21.3 (H)   Creatinine 0.67 - 1.17 mg/dL 0.78   GFR Estimate >60 mL/min/1.73m2 >90   Calcium 8.6 - 10.0 mg/dL 9.9   Anion Gap 7 - 15 mmol/L 11   Magnesium 1.7 - 2.3 mg/dL 1.9   Phosphorus 2.5 - 4.5 mg/dL 4.3   Albumin 3.5 - 5.2 g/dL 4.5   Protein Total 6.4 - 8.3 g/dL 6.9   Alkaline Phosphatase 40 - 129 U/L 78   ALT 0 - 70 U/L 15   AST 0 - 45 U/L 20   Amylase 28 - 100 U/L 98   Bilirubin Total <=1.2 mg/dL 0.4   CK Total 39 - 308 U/L 113   Glucose 70 - 99 mg/dL 124 (H)   Lipase 13 - 60 U/L 105 (H)   WBC 4.0 - 11.0 10e3/uL 9.9   Hemoglobin 13.3 - 17.7 g/dL 14.2   Hematocrit 40.0 - 53.0 % 40.1   Platelet Count 150 - 450 10e3/uL 250   RBC Count 4.40 - 5.90 10e6/uL 4.58   MCV 78 - 100 fL 88   MCH 26.5 - 33.0 pg 31.0   MCHC 31.5 - 36.5 g/dL 35.4   RDW 10.0 - 15.0 % 13.6   % Neutrophils % 50   % Lymphocytes % 33   % Monocytes % 8   % Eosinophils % 8   % Basophils % 1   Absolute Basophils 0.0 - 0.2 10e3/uL 0.1   Absolute Eosinophils 0.0 - 0.7 10e3/uL 0.8 (H)    Absolute Immature Granulocytes <=0.4 10e3/uL 0.0   Absolute Lymphocytes 0.8 - 5.3 10e3/uL 3.3   Absolute Monocytes 0.0 - 1.3 10e3/uL 0.8   % Immature Granulocytes % 0   Absolute Neutrophils 1.6 - 8.3 10e3/uL 5.0   Absolute NRBCs 10e3/uL 0.0   NRBCs per 100 WBC <1 /100 0     Results reviewed, labs MET treatment parameters, ok to proceed with treatment.    /92      Post Infusion Assessment:  Patient tolerated infusion without incident.  Blood return noted pre and post infusion.  Site patent and intact, free from redness, edema or discomfort.  No evidence of extravasations.  Access discontinued per protocol.       Discharge Plan:   Discharge instructions reviewed with: Patient.  Patient and/or family verbalized understanding of discharge instructions and all questions answered.  AVS to patient via [employer].  Patient will return 10/6 for next appointment.   Patient discharged in stable condition accompanied by: self.  Departure Mode: Ambulatory.      Joselin Echols RN

## 2023-09-15 NOTE — PATIENT INSTRUCTIONS
[facility] Triage and after hours / weekends / holidays:  [phone] option 5, option 2    Please call the triage or after hours line if you experience a temperature greater than or equal to 100.4, shaking chills, have uncontrolled nausea, vomiting and/or diarrhea, dizziness, shortness of breath, chest pain, bleeding, unexplained bruising, or if you have any other new/concerning symptoms, questions or concerns.      If you are having any concerning symptoms or wish to speak to a provider before your next infusion visit, please call triage to notify your care team so we can adequately serve you.     If you need a refill on a narcotic prescription or other medication, please call before your infusion appointment.

## 2023-09-22 DIAGNOSIS — C34.31 MALIGNANT NEOPLASM OF LOWER LOBE OF RIGHT LUNG (H): Primary | ICD-10-CM

## 2023-09-26 ENCOUNTER — TELEPHONE (OUTPATIENT)
Dept: NEUROSURGERY | Facility: CLINIC | Age: 43
End: 2023-09-26
Payer: MEDICAID

## 2023-09-26 DIAGNOSIS — C34.31 MALIGNANT NEOPLASM OF LOWER LOBE OF RIGHT LUNG (H): Primary | ICD-10-CM

## 2023-10-06 ENCOUNTER — APPOINTMENT (OUTPATIENT)
Dept: LAB | Facility: CLINIC | Age: 43
End: 2023-10-06
Attending: PHYSICIAN ASSISTANT
Payer: COMMERCIAL

## 2023-10-06 ENCOUNTER — ONCOLOGY VISIT (OUTPATIENT)
Dept: ONCOLOGY | Facility: CLINIC | Age: 43
End: 2023-10-06
Attending: PHYSICIAN ASSISTANT
Payer: COMMERCIAL

## 2023-10-06 VITALS
RESPIRATION RATE: 18 BRPM | SYSTOLIC BLOOD PRESSURE: 149 MMHG | OXYGEN SATURATION: 97 % | TEMPERATURE: 98 F | DIASTOLIC BLOOD PRESSURE: 98 MMHG | BODY MASS INDEX: 31.88 KG/M2 | WEIGHT: 215.9 LBS | HEART RATE: 53 BPM

## 2023-10-06 DIAGNOSIS — C79.31 MALIGNANT NEOPLASM METASTATIC TO BRAIN (H): ICD-10-CM

## 2023-10-06 DIAGNOSIS — F41.9 ANXIETY: Primary | ICD-10-CM

## 2023-10-06 DIAGNOSIS — M25.551 PAIN OF BOTH HIP JOINTS: ICD-10-CM

## 2023-10-06 DIAGNOSIS — C34.90 NON-SMALL CELL LUNG CANCER (NSCLC) (H): ICD-10-CM

## 2023-10-06 DIAGNOSIS — M19.90 ARTHRITIS: ICD-10-CM

## 2023-10-06 DIAGNOSIS — M25.552 PAIN OF BOTH HIP JOINTS: ICD-10-CM

## 2023-10-06 DIAGNOSIS — C34.31 MALIGNANT NEOPLASM OF LOWER LOBE OF RIGHT LUNG (H): ICD-10-CM

## 2023-10-06 DIAGNOSIS — C34.31 MALIGNANT NEOPLASM OF LOWER LOBE OF RIGHT LUNG (H): Primary | ICD-10-CM

## 2023-10-06 DIAGNOSIS — I67.89 RADIATION THERAPY INDUCED BRAIN NECROSIS: ICD-10-CM

## 2023-10-06 DIAGNOSIS — Z79.899 ENCOUNTER FOR LONG-TERM (CURRENT) USE OF MEDICATIONS: ICD-10-CM
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
## 2023-10-06 LAB
ALBUMIN SERPL BCG-MCNC: 4.5 G/DL (ref 3.5–5.2)
ALBUMIN UR-MCNC: 30 MG/DL
ALP SERPL-CCNC: 72 U/L (ref 40–129)
ALT SERPL W P-5'-P-CCNC: 11 U/L (ref 0–70)
AMYLASE SERPL-CCNC: 82 U/L (ref 28–100)
ANION GAP SERPL CALCULATED.3IONS-SCNC: 10 MMOL/L (ref 7–15)
AST SERPL W P-5'-P-CCNC: 16 U/L (ref 0–45)
BASO+EOS+MONOS # BLD AUTO: NORMAL 10*3/UL
BASO+EOS+MONOS NFR BLD AUTO: NORMAL %
BASOPHILS # BLD AUTO: 0.1 10E3/UL (ref 0–0.2)
BASOPHILS NFR BLD AUTO: 1 %
BILIRUB SERPL-MCNC: 0.5 MG/DL
BUN SERPL-MCNC: 22.3 MG/DL (ref 6–20)
CALCIUM SERPL-MCNC: 9.5 MG/DL (ref 8.6–10)
CHLORIDE SERPL-SCNC: 104 MMOL/L (ref 98–107)
CK SERPL-CCNC: 94 U/L (ref 39–308)
CREAT SERPL-MCNC: 0.78 MG/DL (ref 0.67–1.17)
DEPRECATED HCO3 PLAS-SCNC: 29 MMOL/L (ref 22–29)
EGFRCR SERPLBLD CKD-EPI 2021: >90 ML/MIN/1.73M2
EOSINOPHIL # BLD AUTO: 0.7 10E3/UL (ref 0–0.7)
EOSINOPHIL NFR BLD AUTO: 8 %
ERYTHROCYTE [DISTWIDTH] IN BLOOD BY AUTOMATED COUNT: 13.6 % (ref 10–15)
GLUCOSE SERPL-MCNC: 138 MG/DL (ref 70–99)
HCT VFR BLD AUTO: 41 % (ref 40–53)
HGB BLD-MCNC: 14.4 G/DL (ref 13.3–17.7)
IMM GRANULOCYTES # BLD: 0 10E3/UL
IMM GRANULOCYTES NFR BLD: 0 %
LIPASE SERPL-CCNC: 120 U/L (ref 13–60)
LYMPHOCYTES # BLD AUTO: 2.9 10E3/UL (ref 0.8–5.3)
LYMPHOCYTES NFR BLD AUTO: 34 %
MAGNESIUM SERPL-MCNC: 2.1 MG/DL (ref 1.7–2.3)
MCH RBC QN AUTO: 31.2 PG (ref 26.5–33)
MCHC RBC AUTO-ENTMCNC: 35.1 G/DL (ref 31.5–36.5)
MCV RBC AUTO: 89 FL (ref 78–100)
MONOCYTES # BLD AUTO: 0.7 10E3/UL (ref 0–1.3)
MONOCYTES NFR BLD AUTO: 8 %
NEUTROPHILS # BLD AUTO: 4.2 10E3/UL (ref 1.6–8.3)
NEUTROPHILS NFR BLD AUTO: 49 %
NRBC # BLD AUTO: 0 10E3/UL
NRBC BLD AUTO-RTO: 0 /100
PHOSPHATE SERPL-MCNC: 3.7 MG/DL (ref 2.5–4.5)
PLATELET # BLD AUTO: 187 10E3/UL (ref 150–450)
POTASSIUM SERPL-SCNC: 3.5 MMOL/L (ref 3.4–5.3)
PROT SERPL-MCNC: 6.8 G/DL (ref 6.4–8.3)
RBC # BLD AUTO: 4.62 10E6/UL (ref 4.4–5.9)
SODIUM SERPL-SCNC: 143 MMOL/L (ref 135–145)
WBC # BLD AUTO: 8.6 10E3/UL (ref 4–11)

## 2023-10-06 PROCEDURE — 80053 COMPREHEN METABOLIC PANEL: CPT | Performed by: PHYSICIAN ASSISTANT

## 2023-10-06 PROCEDURE — 258N000003 HC RX IP 258 OP 636: Performed by: PHYSICIAN ASSISTANT

## 2023-10-06 PROCEDURE — 81003 URINALYSIS AUTO W/O SCOPE: CPT | Performed by: PHYSICIAN ASSISTANT

## 2023-10-06 PROCEDURE — 96413 CHEMO IV INFUSION 1 HR: CPT

## 2023-10-06 PROCEDURE — 83690 ASSAY OF LIPASE: CPT | Performed by: PHYSICIAN ASSISTANT

## 2023-10-06 PROCEDURE — 82150 ASSAY OF AMYLASE: CPT | Performed by: PHYSICIAN ASSISTANT

## 2023-10-06 PROCEDURE — 36415 COLL VENOUS BLD VENIPUNCTURE: CPT | Performed by: PHYSICIAN ASSISTANT

## 2023-10-06 PROCEDURE — 84100 ASSAY OF PHOSPHORUS: CPT | Performed by: PHYSICIAN ASSISTANT

## 2023-10-06 PROCEDURE — 99215 OFFICE O/P EST HI 40 MIN: CPT | Performed by: PHYSICIAN ASSISTANT

## 2023-10-06 PROCEDURE — 83735 ASSAY OF MAGNESIUM: CPT | Performed by: PHYSICIAN ASSISTANT

## 2023-10-06 PROCEDURE — 85004 AUTOMATED DIFF WBC COUNT: CPT | Performed by: PHYSICIAN ASSISTANT

## 2023-10-06 PROCEDURE — 82550 ASSAY OF CK (CPK): CPT | Performed by: PHYSICIAN ASSISTANT

## 2023-10-06 PROCEDURE — 250N000011 HC RX IP 250 OP 636: Mod: JZ | Performed by: PHYSICIAN ASSISTANT

## 2023-10-06 PROCEDURE — G0463 HOSPITAL OUTPT CLINIC VISIT: HCPCS | Performed by: PHYSICIAN ASSISTANT

## 2023-10-06 RX ORDER — DULOXETIN HYDROCHLORIDE 30 MG/1
CAPSULE, DELAYED RELEASE ORAL
Qty: 60 CAPSULE | Refills: 3 | Status: SHIPPED | OUTPATIENT
Start: 2023-10-06 | End: 2023-12-27

## 2023-10-06 RX ORDER — HEPARIN SODIUM (PORCINE) LOCK FLUSH IV SOLN 100 UNIT/ML 100 UNIT/ML
5 SOLUTION INTRAVENOUS
Status: CANCELLED | OUTPATIENT
Start: 2023-10-06

## 2023-10-06 RX ORDER — EPINEPHRINE 1 MG/ML
0.3 INJECTION, SOLUTION INTRAMUSCULAR; SUBCUTANEOUS EVERY 5 MIN PRN
Status: CANCELLED | OUTPATIENT
Start: 2023-10-06

## 2023-10-06 RX ORDER — ALBUTEROL SULFATE 90 UG/1
1-2 AEROSOL, METERED RESPIRATORY (INHALATION)
Status: CANCELLED
Start: 2023-10-06

## 2023-10-06 RX ORDER — MEPERIDINE HYDROCHLORIDE 25 MG/ML
25 INJECTION INTRAMUSCULAR; INTRAVENOUS; SUBCUTANEOUS EVERY 30 MIN PRN
Status: CANCELLED | OUTPATIENT
Start: 2023-10-06

## 2023-10-06 RX ORDER — BLOOD-GLUCOSE METER
EACH MISCELLANEOUS
COMMUNITY
Start: 2023-05-20

## 2023-10-06 RX ORDER — METHYLPREDNISOLONE SODIUM SUCCINATE 125 MG/2ML
125 INJECTION, POWDER, LYOPHILIZED, FOR SOLUTION INTRAMUSCULAR; INTRAVENOUS
Status: CANCELLED
Start: 2023-10-06

## 2023-10-06 RX ORDER — HEPARIN SODIUM,PORCINE 10 UNIT/ML
5-20 VIAL (ML) INTRAVENOUS DAILY PRN
Status: CANCELLED | OUTPATIENT
Start: 2023-10-06

## 2023-10-06 RX ORDER — ALBUTEROL SULFATE 0.83 MG/ML
2.5 SOLUTION RESPIRATORY (INHALATION)
Status: CANCELLED | OUTPATIENT
Start: 2023-10-06

## 2023-10-06 RX ORDER — LEVETIRACETAM 1000 MG/1
TABLET ORAL
OUTPATIENT
Start: 2023-10-06

## 2023-10-06 RX ORDER — LANCETS
EACH MISCELLANEOUS
COMMUNITY
Start: 2023-05-20 | End: 2023-12-27

## 2023-10-06 RX ORDER — LORAZEPAM 2 MG/ML
0.5 INJECTION INTRAMUSCULAR EVERY 4 HOURS PRN
Status: CANCELLED | OUTPATIENT
Start: 2023-10-06

## 2023-10-06 RX ORDER — LEVETIRACETAM 1000 MG/1
TABLET ORAL
COMMUNITY
Start: 2023-08-14 | End: 2023-10-12

## 2023-10-06 RX ORDER — DIPHENHYDRAMINE HYDROCHLORIDE 50 MG/ML
50 INJECTION INTRAMUSCULAR; INTRAVENOUS
Status: CANCELLED
Start: 2023-10-06

## 2023-10-06 RX ADMIN — BEVACIZUMAB 1500 MG: 400 INJECTION, SOLUTION INTRAVENOUS at 12:30

## 2023-10-06 RX ADMIN — SODIUM CHLORIDE 250 ML: 9 INJECTION, SOLUTION INTRAVENOUS at 12:10

## 2023-10-06 ASSESSMENT — PAIN SCALES - GENERAL: PAINLEVEL: NO PAIN (0)

## 2023-10-06 NOTE — PROGRESS NOTES
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 He is currently undergoing treatment with brigatinib and bevacizumab. Please see previous note by Dr. Persaud for further details on the patient's history.     He comes in today for bevacizumab.    Interval History:  -Connor feels he is doing ok today. His BP readings 140-150/.  (They were in the 170/100 + range) he has been taking the propanolol 40mg BID, lisinopril 40 mg and hydrochlorothiazide to 50mg. He has had ongoing headaches since his last brain surgery. These are overall resolved when we used the dex during C2.    -he tolerated C3 well, no N/V. Had one brief episode of blood in urine, never occurred again  -He follows with palliative for pain control.    -No speech changes, vision changes, paresthesias, focal weakness, leg swelling, or activity concerning for seizures.  -He has felt a bit more tired, napping 1-2 x daily and sleeping extensively at night and occasionally forgetful  -No chest pain or SOB.   -No cough or fever.  -Having regular BM;s, last this AM. No abdominal pain.  -No bleeding concerns.  -Some peeling to skin on left hand which is not painful. Reports this happens once per year for 1 week and then goes away. He states its getting better.   -ongoing and worsening, per his report of arthralgias, likely from brigatinib, but he feels the Avastin has made this worse  -methadone 1 in the AM and 1 PM, oxycodone 2 in the AM, 2 at lunch and 2 bedtime  -rare usage of the muscle relaxer  -had hiccups x 1 and used baclofen, not needed again      Physical Examination:  BP (!) 149/98 (BP Location: Right arm, Patient Position: Sitting, Cuff Size: Adult Regular)   Pulse 53   Temp 98  F (36.7  C) (Oral)   Resp 18    Wt 97.9 kg (215 lb 14.4 oz)   SpO2 97%   BMI 31.88 kg/m    Wt Readings from Last 4 Encounters:   10/06/23 97.9 kg (215 lb 14.4 oz)   09/15/23 98.3 kg (216 lb 11.2 oz)   09/13/23 98.4 kg (217 lb)   08/22/23 98.4 kg (217 lb)         General: Well-appearing male in no acute distress.  Eyes: EOMI, PERRL. No scleral icterus or conjunctival injection.  ENT: Oral mucosa is moist without lesions or thrush.   Lymphatic: Neck is supple without cervical or supraclavicular lymphadenopathy.   Cardiovascular: RRR No murmurs. No peripheral edema.  Respiratory: CTA bilaterally. No wheezes or crackles.  Gastrointestinal: BS +. Abdomen rounded, soft, non-tender.   Neurologic: Cranial nerves II through XII are grossly intact.   Skin: Peeling to skin left hand without associated erythema. No rashes, petechiae, or bruising noted on exposed skin.   Psych: Alert. Pleasant. Comprehension intact.   Laboratory Data:  Most Recent 3 CBC's:  Recent Labs   Lab Test 10/06/23  0907 09/15/23  1233 08/22/23  1539   WBC 8.6 9.9 21.5*   HGB 14.4 14.2 15.6   MCV 89 88 86    250 291   ANEUTAUTO 4.2 5.0 16.7*    Most Recent 3 BMP's:  Recent Labs   Lab Test 09/15/23  1233 08/22/23  1539 08/16/23  1204    140 140   POTASSIUM 3.8 3.5 3.4   CHLORIDE 102 95* 99   CO2 31* 30* 29   BUN 21.3* 33.4* 12.6   CR 0.78 0.72 0.67   ANIONGAP 11 15 12   TORY 9.9 10.2* 9.5   * 239* 170*   PROTTOTAL 6.9 7.2 6.9   ALBUMIN 4.5 4.6 4.5    Most Recent 2 LFT's:  Recent Labs   Lab Test 09/15/23  1233 08/22/23  1539   AST 20 17   ALT 15 16   ALKPHOS 78 91   BILITOTAL 0.4 0.5    Most Recent TSH and T4:  Recent Labs   Lab Test 09/12/22  1642   TSH 2.56   Reviewed lipase, amylase, CK, and magnesium. Lipase elevated slightly.   I reviewed the above labs today.    Imaging:  CT Chest/Abdomen/Pelvis w Contrast  Narrative: CT CHEST/ABDOMEN/PELVIS W CONTRAST 9/12/2023 3:01 PM    CLINICAL HISTORY: stage 4 NSCLC< restaging CT; Malignant neoplasm of  lower lobe of right  lung (H); Radiation therapy induced brain  necrosis; Radiation therapy induced brain necrosis    TECHNIQUE: CT scan of the chest, abdomen, and pelvis was performed  following injection of IV contrast. Multiplanar reformats were  obtained. Dose reduction techniques were used.   CONTRAST: 100mL Isovue-370    COMPARISON: 8/22/2023 and 7/10/2023    FINDINGS:   LUNGS AND PLEURA: Stable posteromedial right lower lobe nodule  measuring 1.7 x 1.4 cm, previously 1.7 x 1.3 cm (12/180). Mild  bibasilar atelectasis. Few other tiny pulmonary nodules are also  stable. For example, a 2 mm left upper lobe nodule (12/72 and 3 mm  left upper lobe nodule (12/81) are unchanged. No new or enlarging  nodules. No infiltrate or pleural effusion.    MEDIASTINUM/AXILLAE: No lymphadenopathy. Few partially calcified lymph  nodes in the right hilum are stable. No thoracic aortic aneurysm.  Small hiatal hernia. Mild coronary artery opacifications.    HEPATOBILIARY: No suspicious lesions of the liver.    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    SPLEEN: Normal.    ADRENAL GLANDS: Normal.    KIDNEYS/BLADDER: No significant mass, stones, or hydronephrosis. There  are simple or benign cysts. No follow up is needed.    BOWEL: Normal with no obstruction or acute inflammatory change.  Nothing for appendicitis.    PELVIC ORGANS: Small fat-containing left inguinal hernia. No pelvic  masses.    ADDITIONAL FINDINGS: No lymphadenopathy in the pelvis. No abdominal  aortic aneurysm. No free fluid or fluid collections.    MUSCULOSKELETAL: No suspicious lesions of the bones.  Impression: IMPRESSION:  1.  Stable right lower lobe dominant nodular opacity. No new disease  in the chest, abdomen and pelvis.    MANI RED MD         SYSTEM ID:  [id number]    I reviewed the above imaging today.    Assessment and Plan:    Stage IV NSCLC, Rt lung adenocarcinoma with metastasis to pleura,  mediastinum , rt pleural effusion and brain   -brigatinib 2/22/23, multiple different doses, currently at 120 mg, most recent reduction due to myalgias.  Feels arthralgias/myalgias are worse, possibly Avastin contributing?  We discussed changing celexa to cymbalta to see if this helps mood and this chronic pain.       # Brain mets:  # Radiation necrosis  -Baseline Brain MRI with several brain mets, s/p GK to 11-12 brain mets. F/u Brain MRI in June 2022 was showing enlargement of the one of the lesions along with edema, therefore had to undergo craniotomy followed by resection, the final biopsy consistent with radiation necrosis. -currently on bevacizumab for radiation necrosis --15 mg/kg every 3 weeks.    (Of note, Connor S/p 2 doses of Bevacixumab fall of 2022, stopped due HTN urgency and PE.) MRI in 4/2023 now showing contrast enhancement of lesions and a dominat lesion int he rt frontal regionw ith edema, several other smaller lesion. Short term MRI showing increase in size of the rt frontal lesion, underwent resection, patho again showing radiation necrosis.   -BP has been more consistent in the 140/90 range.  Will ask him to continue to monitor 1-2 x daily at home.    - continue propanolol to 40 mg TID, 50 mg of hydrochlorothiazide and lisinopril 40 mg daily.  If needed could consider CCB or hydralazine.   -Check BP twice daily. Educated on risk of stroke due to uncontrolled BP and warning signs of this that warrant immediate care.     Headaches  -Present since recent craniotomy on 6/27/23. MRI brain on 7/27/23 with reduced vasogenic edema. Small dose of Dex + taper in August, these have resolved.       Elevated Lipase  -Elevated at 120. Not at grade 3 as it's currently <2xULN. Repeat labs in 1 week; if increases to grade 3, may need to hold Brigatinib until it improves.     Diabetes, Type 2  Reports improved control. Currently running about 150-170 at home  --on Metformin and insulin    PE: provoked by  malignancy vs bevacizumab in 11/2022  -- on rivaroxiabn 20 mg daily    Grade 2-3 arthralgias  All joints affected, no morning stiffness, normal ESR and CRP  Recently has noticed more fatigue and stifnnes in body.  2/2 to brigatinib and possible worsening with Avastin.   -following with palliative;   -on methadone BID; oxycodone 5-10 mg po q 4 hours prn; Celecoxib 100 mg po BID  -asked to reduce pm oxycodone since he is sleeping well without issues  -see above- switching from celexa to cymbalta to see if this helps with chronic arthralgias    50 minutes spent on the date of the encounter doing chart review, review of test results, interpretation of tests, patient visit, and documentation      Stella Navarro PA-C  [facility]  [address] [place] [id number] [phone]

## 2023-10-06 NOTE — LETTER
10/6/2023         RE: Connor Emerson  [address]  [facility] [address]        Dear Colleague,    Thank you for referring your patient, Connor Emerson, to the [facility]. Please see a copy of my visit note below.    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 He is currently undergoing treatment with brigatinib and bevacizumab. Please see previous note by Dr. Persaud for further details on the patient's history.     He comes in today for bevacizumab.    Interval History:  -Connor feels he is doing ok today. His BP readings 140-150/.  (They were in the 170/100 + range) he has been taking the propanolol 40mg BID, lisinopril 40 mg and hydrochlorothiazide to 50mg. He has had ongoing headaches since his last brain surgery. These are overall resolved when we used the dex during C2.    -he tolerated C3 well, no N/V. Had one brief episode of blood in urine, never occurred again  -He follows with palliative for pain control.    -No speech changes, vision changes, paresthesias, focal weakness, leg swelling, or activity concerning for seizures.  -He has felt a bit more tired, napping 1-2 x daily and sleeping extensively at night and occasionally forgetful  -No chest pain or SOB.   -No cough or fever.  -Having regular BM;s, last this AM. No abdominal pain.  -No bleeding concerns.  -Some peeling to skin on left hand which is not painful. Reports this happens once per year for 1 week and then goes away. He states its getting better.   -ongoing and worsening, per his report of arthralgias, likely from brigatinib, but he feels the Avastin has made this worse  -methadone 1 in the AM and 1 PM, oxycodone 2 in the AM, 2 at lunch and 2 bedtime  -rare  usage of the muscle relaxer  -had hiccups x 1 and used baclofen, not needed again      Physical Examination:  BP (!) 149/98 (BP Location: Right arm, Patient Position: Sitting, Cuff Size: Adult Regular)   Pulse 53   Temp 98  F (36.7  C) (Oral)   Resp 18   Wt 97.9 kg (215 lb 14.4 oz)   SpO2 97%   BMI 31.88 kg/m    Wt Readings from Last 4 Encounters:   10/06/23 97.9 kg (215 lb 14.4 oz)   09/15/23 98.3 kg (216 lb 11.2 oz)   09/13/23 98.4 kg (217 lb)   08/22/23 98.4 kg (217 lb)         General: Well-appearing male in no acute distress.  Eyes: EOMI, PERRL. No scleral icterus or conjunctival injection.  ENT: Oral mucosa is moist without lesions or thrush.   Lymphatic: Neck is supple without cervical or supraclavicular lymphadenopathy.   Cardiovascular: RRR No murmurs. No peripheral edema.  Respiratory: CTA bilaterally. No wheezes or crackles.  Gastrointestinal: BS +. Abdomen rounded, soft, non-tender.   Neurologic: Cranial nerves II through XII are grossly intact.   Skin: Peeling to skin left hand without associated erythema. No rashes, petechiae, or bruising noted on exposed skin.   Psych: Alert. Pleasant. Comprehension intact.   Laboratory Data:  Most Recent 3 CBC's:  Recent Labs   Lab Test 10/06/23  0907 09/15/23  1233 08/22/23  1539   WBC 8.6 9.9 21.5*   HGB 14.4 14.2 15.6   MCV 89 88 86    250 291   ANEUTAUTO 4.2 5.0 16.7*    Most Recent 3 BMP's:  Recent Labs   Lab Test 09/15/23  1233 08/22/23  1539 08/16/23  1204    140 140   POTASSIUM 3.8 3.5 3.4   CHLORIDE 102 95* 99   CO2 31* 30* 29   BUN 21.3* 33.4* 12.6   CR 0.78 0.72 0.67   ANIONGAP 11 15 12   TORY 9.9 10.2* 9.5   * 239* 170*   PROTTOTAL 6.9 7.2 6.9   ALBUMIN 4.5 4.6 4.5    Most Recent 2 LFT's:  Recent Labs   Lab Test 09/15/23  1233 08/22/23  1539   AST 20 17   ALT 15 16   ALKPHOS 78 91   BILITOTAL 0.4 0.5    Most Recent TSH and T4:  Recent Labs   Lab Test 09/12/22  1642   TSH 2.56   Reviewed lipase, amylase, CK, and magnesium. Lipase  elevated slightly.   I reviewed the above labs today.    Imaging:  CT Chest/Abdomen/Pelvis w Contrast  Narrative: CT CHEST/ABDOMEN/PELVIS W CONTRAST 9/12/2023 3:01 PM    CLINICAL HISTORY: stage 4 NSCLC< restaging CT; Malignant neoplasm of  lower lobe of right lung (H); Radiation therapy induced brain  necrosis; Radiation therapy induced brain necrosis    TECHNIQUE: CT scan of the chest, abdomen, and pelvis was performed  following injection of IV contrast. Multiplanar reformats were  obtained. Dose reduction techniques were used.   CONTRAST: 100mL Isovue-370    COMPARISON: 8/22/2023 and 7/10/2023    FINDINGS:   LUNGS AND PLEURA: Stable posteromedial right lower lobe nodule  measuring 1.7 x 1.4 cm, previously 1.7 x 1.3 cm (12/180). Mild  bibasilar atelectasis. Few other tiny pulmonary nodules are also  stable. For example, a 2 mm left upper lobe nodule (12/72 and 3 mm  left upper lobe nodule (12/81) are unchanged. No new or enlarging  nodules. No infiltrate or pleural effusion.    MEDIASTINUM/AXILLAE: No lymphadenopathy. Few partially calcified lymph  nodes in the right hilum are stable. No thoracic aortic aneurysm.  Small hiatal hernia. Mild coronary artery opacifications.    HEPATOBILIARY: No suspicious lesions of the liver.    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    SPLEEN: Normal.    ADRENAL GLANDS: Normal.    KIDNEYS/BLADDER: No significant mass, stones, or hydronephrosis. There  are simple or benign cysts. No follow up is needed.    BOWEL: Normal with no obstruction or acute inflammatory change.  Nothing for appendicitis.    PELVIC ORGANS: Small fat-containing left inguinal hernia. No pelvic  masses.    ADDITIONAL FINDINGS: No lymphadenopathy in the pelvis. No abdominal  aortic aneurysm. No free fluid or fluid collections.    MUSCULOSKELETAL: No suspicious lesions of the bones.  Impression: IMPRESSION:  1.  Stable right lower lobe  dominant nodular opacity. No new disease  in the chest, abdomen and pelvis.    MANI RED MD         SYSTEM ID:  [id number]    I reviewed the above imaging today.    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  Feels arthralgias/myalgias are worse, possibly Avastin contributing?  We discussed changing celexa to cymbalta to see if this helps mood and this chronic pain.       # Brain mets:  # Radiation necrosis  -Baseline Brain MRI with several brain mets, s/p GK to 11-12 brain mets. F/u Brain MRI in June 2022 was showing enlargement of the one of the lesions along with edema, therefore had to undergo craniotomy followed by resection, the final biopsy consistent with radiation necrosis. -currently on bevacizumab for radiation necrosis --15 mg/kg every 3 weeks.    (Of note, Connor S/p 2 doses of Bevacixumab fall of 2022, stopped due HTN urgency and PE.) MRI in 4/2023 now showing contrast enhancement of lesions and a dominat lesion int he rt frontal regionw ith edema, several other smaller lesion. Short term MRI showing increase in size of the rt frontal lesion, underwent resection, patho again showing radiation necrosis.   -BP has been more consistent in the 140/90 range.  Will ask him to continue to monitor 1-2 x daily at home.    - continue propanolol to 40 mg TID, 50 mg of hydrochlorothiazide and lisinopril 40 mg daily.  If needed could consider CCB or hydralazine.   -Check BP twice daily. Educated on risk of stroke due to uncontrolled BP and warning signs of this that warrant immediate care.     Headaches  -Present since recent craniotomy on 6/27/23. MRI brain on 7/27/23 with reduced vasogenic edema. Small dose of Dex + taper in August, these have resolved.       Elevated Lipase  -Elevated at 120. Not at grade 3 as  it's currently <2xULN. Repeat labs in 1 week; if increases to grade 3, may need to hold Brigatinib until it improves.     Diabetes, Type 2  Reports improved control. Currently running about 150-170 at home  --on Metformin and insulin    PE: provoked by malignancy vs bevacizumab in 11/2022  -- on rivaroxiabn 20 mg daily    Grade 2-3 arthralgias  All joints affected, no morning stiffness, normal ESR and CRP  Recently has noticed more fatigue and stifnnes in body.  2/2 to brigatinib and possible worsening with Avastin.   -following with palliative;   -on methadone BID; oxycodone 5-10 mg po q 4 hours prn; Celecoxib 100 mg po BID  -asked to reduce pm oxycodone since he is sleeping well without issues  -see above- switching from celexa to cymbalta to see if this helps with chronic arthralgias    50 minutes spent on the date of the encounter doing chart review, review of test results, interpretation of tests, patient visit, and documentation      Stella Navarro PA-C  [facility]  [address] [place] [address] [id number] [phone]

## 2023-10-06 NOTE — NURSING NOTE
"Oncology Rooming Note    October 6, 2023 9:15 AM   Connor Emerson is a 43 year old male who presents for:    Chief Complaint   Patient presents with    Blood Draw     Labs drawn with PIV start by RN. Pt tolerated well. Vitals taken. Patient checked into next appointment.      Oncology Clinic Visit     Non-small cell lung cancer     Initial Vitals: BP (!) 149/98 (BP Location: Right arm, Patient Position: Sitting, Cuff Size: Adult Regular)   Pulse 53   Temp 98  F (36.7  C) (Oral)   Resp 18   Wt 97.9 kg (215 lb 14.4 oz)   SpO2 97%   BMI 31.88 kg/m   Estimated body mass index is 31.88 kg/m  as calculated from the following:    Height as of 9/13/23: 1.753 m (5' 9\").    Weight as of this encounter: 97.9 kg (215 lb 14.4 oz). Body surface area is 2.18 meters squared.  No Pain (0) Comment: Data Unavailable   No LMP for male patient.  Allergies reviewed: Yes  Medications reviewed: Yes    Medications: Medication refills not needed today.  Pharmacy name entered into EPIC:    [id number] [place], [place] - [address]  [facility] - [place], [place] - [address]  [place], [place] - [facility] [address]    Clinical concerns: none.       Flakito Lay"

## 2023-10-06 NOTE — PATIENT INSTRUCTIONS
[facility] Triage and after hours / weekends / holidays:  [phone] option 5, option 2    Please call the triage or after hours line if you experience a temperature greater than or equal to 100.4, shaking chills, have uncontrolled nausea, vomiting and/or diarrhea, dizziness, shortness of breath, chest pain, bleeding, unexplained bruising, or if you have any other new/concerning symptoms, questions or concerns.      If you are having any concerning symptoms or wish to speak to a provider before your next infusion visit, please call triage to notify your care team so we can adequately serve you.     If you need a refill on a narcotic prescription or other medication, please call before your infusion appointment.

## 2023-10-06 NOTE — TELEPHONE ENCOUNTER
Medication requested: Levetiracetam 1000 mg tablet    Last prescribing provider: Pt reported    Last clinic visit date: 9/13/23 with Dr. Persaud    Recommendations for requested medication (if none, N/A): Pt's pharmacy sending refill request to Dr. Persaud; from last provider note regarding Keppra: 9/13/23:    7/18/22 to 7/19/22- Admitted to [facility] for seizure- Reportedly was only taking once Keppra instead of twice daily. Also resumed on dexamethasone 2 mg daily    Any other pertinent information (if none, N/A): NA    Refilled: Y/N, if NO, why? Pt needs to contact original prescribing provider per Dr. Persaud.  Message sent to pt advising him to contact prescribing provider for Keppra refill.    Pended and Routed to Dr. Persaud

## 2023-10-06 NOTE — PROGRESS NOTES
Infusion Nursing Note:  Connor Emerson presents today for C4D1 Avastin.    Patient seen by provider today: Yes: Stella Navarro PA-C prior to infusion   present during visit today: Not Applicable.    Note: Connor denied any questions or concerns following his visit with the provider prior to infusion today.    Patient's urine protein was elevated 3 weeks ago. Per treatment plan, patient was given 24 hour urine collection kit however collection was not completed prior to infusion today. JERRICA Douglas notified.    Urine protein elevated again today to 30. Per treatment plan, patient should collect a 24 hour urine collection prior to next infusion. Patient decided he would collect sample starting on 10/24 and then turn sample into [facility] on 10/25. Patient set reminder on phone to complete collection. IB sent to [facility] to follow up with patient prior to next infusion to remind him of urine collection. Patient confirmed that he had collection supplies at home and did not need new supplies today.      Intravenous Access:  Peripheral IV placed.    Treatment Conditions:   Latest Reference Range & Units 10/06/23 09:07 10/06/23 11:33   Sodium 135 - 145 mmol/L 143    Potassium 3.4 - 5.3 mmol/L 3.5    Chloride 98 - 107 mmol/L 104    Carbon Dioxide (CO2) 22 - 29 mmol/L 29    Urea Nitrogen 6.0 - 20.0 mg/dL 22.3 (H)    Creatinine 0.67 - 1.17 mg/dL 0.78    GFR Estimate >60 mL/min/1.73m2 >90    Calcium 8.6 - 10.0 mg/dL 9.5    Anion Gap 7 - 15 mmol/L 10    Magnesium 1.7 - 2.3 mg/dL 2.1    Phosphorus 2.5 - 4.5 mg/dL 3.7    Albumin 3.5 - 5.2 g/dL 4.5    Protein Total 6.4 - 8.3 g/dL 6.8    Alkaline Phosphatase 40 - 129 U/L 72    ALT 0 - 70 U/L 11    AST 0 - 45 U/L 16    Amylase 28 - 100 U/L 82    Bilirubin Total <=1.2 mg/dL 0.5    CK Total 39 - 308 U/L 94    Glucose 70 - 99 mg/dL 138 (H)    Lipase 13 - 60 U/L 120 (H)    WBC 4.0 - 11.0 10e3/uL 8.6    Hemoglobin 13.3 - 17.7 g/dL 14.4    Hematocrit 40.0 - 53.0 % 41.0     Platelet Count 150 - 450 10e3/uL 187    RBC Count 4.40 - 5.90 10e6/uL 4.62    MCV 78 - 100 fL 89    MCH 26.5 - 33.0 pg 31.2    MCHC 31.5 - 36.5 g/dL 35.1    RDW 10.0 - 15.0 % 13.6    % Neutrophils % 49    % Lymphocytes % 34    % Monocytes % 8    % Eosinophils % 8    % Basophils % 1    Absolute Basophils 0.0 - 0.2 10e3/uL 0.1    Absolute Eosinophils 0.0 - 0.7 10e3/uL 0.7    Absolute Immature Granulocytes <=0.4 10e3/uL 0.0    Absolute Lymphocytes 0.8 - 5.3 10e3/uL 2.9    Absolute Monocytes 0.0 - 1.3 10e3/uL 0.7    % Immature Granulocytes % 0    Absolute Neutrophils 1.6 - 8.3 10e3/uL 4.2    Absolute NRBCs 10e3/uL 0.0    NRBCs per 100 WBC <1 /100 0    Protein Albumin Urine Negative mg/dL  30 !     Results reviewed, labs MET treatment parameters, ok to proceed with treatment.    /98    Post Infusion Assessment:  Patient tolerated infusion without incident.  Blood return noted pre and post infusion.  Site patent and intact, free from redness, edema or discomfort.  No evidence of extravasations.  Access discontinued per protocol.       Discharge Plan:   Prescription refills given for cymbalta.  Discharge instructions reviewed with: Patient.  Patient and/or family verbalized understanding of discharge instructions and all questions answered.  AVS to patient via [employer].  Patient will return 10/27 for next appointment.   Patient discharged in stable condition accompanied by: self.  Departure Mode: Ambulatory.      Joselin Echols RN

## 2023-10-06 NOTE — NURSING NOTE
Chief Complaint   Patient presents with    Blood Draw     Labs drawn with PIV start by RN. Pt tolerated well. Vitals taken. Patient checked into next appointment.       Purnima Peng RN

## 2023-10-12 ENCOUNTER — MYC REFILL (OUTPATIENT)
Dept: RADIATION ONCOLOGY | Facility: HOSPITAL | Age: 43
End: 2023-10-12
Payer: MEDICAID

## 2023-10-12 ENCOUNTER — MYC REFILL (OUTPATIENT)
Dept: ONCOLOGY | Facility: CLINIC | Age: 43
End: 2023-10-12
Payer: MEDICAID

## 2023-10-12 DIAGNOSIS — D49.6 BRAIN TUMOR (H): Primary | ICD-10-CM

## 2023-10-12 DIAGNOSIS — Z98.890 S/P CRANIOTOMY: ICD-10-CM

## 2023-10-12 DIAGNOSIS — D49.6 BRAIN TUMOR (H): ICD-10-CM

## 2023-10-12 DIAGNOSIS — G89.3 CANCER ASSOCIATED PAIN: ICD-10-CM

## 2023-10-12 RX ORDER — LEVETIRACETAM 1000 MG/1
1000 TABLET ORAL 2 TIMES DAILY
Qty: 60 TABLET | Refills: 11 | Status: SHIPPED | OUTPATIENT
Start: 2023-10-12 | End: 2024-08-20

## 2023-10-12 RX ORDER — METHADONE HYDROCHLORIDE 5 MG/1
TABLET ORAL
Qty: 45 TABLET | Refills: 0 | Status: SHIPPED | OUTPATIENT
Start: 2023-10-12 | End: 2023-11-08

## 2023-10-12 RX ORDER — OXYCODONE HYDROCHLORIDE 10 MG/1
10 TABLET ORAL
Qty: 90 TABLET | Refills: 0 | Status: SHIPPED | OUTPATIENT
Start: 2023-10-12 | End: 2023-10-26

## 2023-10-12 NOTE — TELEPHONE ENCOUNTER
Received [employer] message from patient requesting refill of methadone.     Last refill: 9/6/23  Last office visit: 8/14/23  Scheduled for follow up 10/23/23     Will route request to NP for review.     [place]ed [place].

## 2023-10-12 NOTE — TELEPHONE ENCOUNTER
Pending Prescriptions:                       Disp   Refills    levETIRAcetam (KEPPRA) 1000 MG tablet                       Last prescribing provider:  12/19/22    Last clinic visit date: 10/06/23 w/Sarina Navarro    Recommendations for requested medication (if none, N/A): N/A    Any other pertinent information (if none, N/A): N/A    Refilled: Y/N, if NO, why?

## 2023-10-12 NOTE — TELEPHONE ENCOUNTER
Contacted pt spouse to verify dose pt is taking for Keppra. Kylie stating pt is currently taking 1000 mg BID.

## 2023-10-12 NOTE — TELEPHONE ENCOUNTER
Received [employer] message from patient requesting refill of oxcodone.     Last refill: 9/27/23  Last office visit: 8/14/23  Scheduled for follow up 10/23/23     Will route request to NP for review.     Reviewed [place].

## 2023-10-17 NOTE — DISCHARGE INSTRUCTIONS
We have found your catheter is working appropriately but might be not draining due to what is called loculated fluid or fluid that is stuck in between the lung and the chest cage because of scarring or the known cancer in your right lung.  Please keep an eye on your pulse ox if this significantly drops below 90 return to the emergency room if you develop CRV or pain or high fever return here.  Keep an eye on your blood sugar this is likely elevated due to dexamethasone use.  Okay to use short acting insulin to cover for high sugar.  Turn with severe increase in pain as well please follow-up with oncology for definitive treatment.  We wish you luck on your initiation of chemotherapy.  
No

## 2023-10-20 DIAGNOSIS — C78.02 MALIGNANT NEOPLASM METASTATIC TO BOTH LUNGS (H): ICD-10-CM

## 2023-10-20 DIAGNOSIS — C78.01 MALIGNANT NEOPLASM METASTATIC TO BOTH LUNGS (H): ICD-10-CM

## 2023-10-20 DIAGNOSIS — D49.6 BRAIN TUMOR (H): Primary | ICD-10-CM

## 2023-10-23 ENCOUNTER — VIRTUAL VISIT (OUTPATIENT)
Dept: RADIATION ONCOLOGY | Facility: HOSPITAL | Age: 43
End: 2023-10-23
Payer: COMMERCIAL

## 2023-10-23 VITALS — HEIGHT: 69 IN | BODY MASS INDEX: 31.84 KG/M2 | WEIGHT: 215 LBS

## 2023-10-23 DIAGNOSIS — C79.31 METASTASIS TO BRAIN (H): ICD-10-CM

## 2023-10-23 DIAGNOSIS — F43.23 ADJUSTMENT DISORDER WITH MIXED ANXIETY AND DEPRESSED MOOD: ICD-10-CM

## 2023-10-23 DIAGNOSIS — C34.90 NON-SMALL CELL LUNG CANCER, UNSPECIFIED LATERALITY (H): ICD-10-CM

## 2023-10-23 DIAGNOSIS — Z51.5 PALLIATIVE CARE PATIENT: Primary | ICD-10-CM

## 2023-10-23 DIAGNOSIS — G89.3 CANCER ASSOCIATED PAIN: ICD-10-CM

## 2023-10-23 PROCEDURE — 99215 OFFICE O/P EST HI 40 MIN: CPT | Mod: VID | Performed by: FAMILY MEDICINE

## 2023-10-23 ASSESSMENT — PAIN SCALES - GENERAL: PAINLEVEL: NO PAIN (0)

## 2023-10-23 NOTE — PROGRESS NOTES
Virtual Visit Details    Type of service:  Video Visit   Video Start Time: 1:24 PM  Video End Time: 1342    Originating Location (pt. Location): Home    Distant Location (provider location):  On-site  Platform used for Video Visit: [facility]    Palliative Care Outpatient Clinic Progress Note    Patient Name: Connor Emerson  Primary Provider: Bassam Persaud    Impression & Recommendations & Counseling:  Connor Emerson is a 42 year old male with history of NSCLC with ALK rearrangement and mets to the brain s/p gamma knife treatments and craniotomy open excision and currently on a second line TKI. He is experiencing 'stiffness' from the TKI.  ECO  Decisional Capacity: very present     PDMP review:  Yes, no concerns     Metastatic NSCLC with ALK rearrangement  S/p GK to brain mets and craniotomy for excision  Cancer associated pain on Methadone 2.5 mg po in AM and 5 mg at HS and using prn oxycodone 10 mg about TID  TKI associated muscle stiffness  HTN  Headaches--overall better and Connor prefers to minimize use of steroids, if at all possible.        Goals of Care:  Connor wants to continue cancer-directed therapies as long as the side effects are tolerable.  He is a FULL code should he have a cardiac arrest. He is OK being cared for in the acute care hospital if needed.     Recommendations & Counseling:  Continue cancer care per Dr. Persaud and his team  Continue Methadone 2.5 mg po in AM and 5 mg at HS ; he has not felt over sedated though he does have some fatigue  Continue oxycodone 5-10 mg po q 4 hours prn;  Continue to hold Ambien   Continue Duloxetine.  Narcan nasal spray also sent to pharmacy        Start flexeril 5 mg at HS to see if it helps morning stiffness;   UTD and [facility] pharmacy resources did not indicate a drug-drug interaction with methadone.    F/up in 8 weeks and sooner prn.           Counseling: All of the above was explained to the patient in lay language. The patient has verbalized a clear  understanding of the discussion, asked appropriate questions, which have been answered to patient's apparent satisfaction. The patient is in agreement with the above plan.        Chief Complaint/Patient ID: Connor Emerson 42 year old male with PMHx of  NSCLC with ALK rearrangement and mets to the brain s/p gamma knife treatments and craniotomy open excision and currently on a second line TKI. He is experiencing 'stiffness' from the TKI.    Last Palliative care appointment: 08/14/2023 with me     Reviewed: yes, no concerns    Interim History:  Connor Emerson is a 43 year old male who is seen today for follow up with Palliative Care via billable video visit. He feels that things are going better with the current regimen and doesn't want to make any changes.     Pain:  good control at this time    Appetite/Nausea: good to fair--runs in streaks.     Bowels: no constipation or diarrhea.     Sleep: very good; hasn't needed zolpidem for awhile     Mood: much better with duloxetine     Coping:  much better with better pain control and mood stabilizer;     Family History- Reviewed in Epic.    Allergies   Allergen Reactions    Vicodin [Hydrocodone-Acetaminophen] Nausea and Vomiting and GI Disturbance       Social History:  Pertinent changes to social history/social situation since last visit: none  Key support resources: family  Advance Directive Status: he has a link and hopes to complete them by next visit.    Social History     Tobacco Use    Smoking status: Never     Passive exposure: Never    Smokeless tobacco: Never   Vaping Use    Vaping Use: Never used   Substance Use Topics    Alcohol use: Yes     Alcohol/week: 0.0 standard drinks of alcohol     Comment: social    Drug use: No         Allergies   Allergen Reactions    Vicodin [Hydrocodone-Acetaminophen] Nausea and Vomiting and GI Disturbance     Current Outpatient Medications   Medication Sig Dispense Refill    acetaminophen (TYLENOL) 325 MG tablet Take 3 tablets  (975 mg) by mouth every 8 hours 40 tablet 0    Alcohol Swabs PADS Use to swab the area of the injection or jabier as directed. Per insurance coverage 100 each 0    baclofen (LIORESAL) 10 MG tablet Take 0.5-1 tablets (5-10 mg) by mouth 3 times daily as needed for other (hiccups) 60 tablet 4    blood glucose (NO BRAND SPECIFIED) lancets standard To use to test glucose level in the blood Use to test blood sugar  4  times daily as directed. To accompany glucose monitor brands per insurance coverage. 100 each 3    blood glucose (NO BRAND SPECIFIED) test strip To use to test glucose level in the blood Use to test blood sugar  4 times daily as directed. To accompany glucose monitor brands per insurance coverage. 100 strip 3    blood glucose monitoring (SOFTCLIX) lancets       Blood Glucose Monitoring Suppl (ACCU-CHEK GUIDE) w/Device KIT       brigatinib (ALUNBRIG) 30 MG TABS tablet Take 4 tablets (120 mg) by mouth daily May be taken with or without food. Swallow whole. Do not crush or chew tablets. 120 tablet 0    Continuous Blood Gluc Sensor (FREESTYLE IRENE 2 SENSOR) MISC 1 each every 14 days Apply as directed per  instructions 2 each 11    cyclobenzaprine (FLEXERIL) 5 MG tablet Take 1 tablet (5 mg) by mouth nightly as needed for muscle spasms 30 tablet 4    DULoxetine (CYMBALTA) 30 MG capsule Take 30 mg once daily x 7 days.  Then increase to 30 mg, twice a day. 60 capsule 3    hydrochlorothiazide (HYDRODIURIL) 50 MG tablet Take 1 tablet (50 mg) by mouth daily 30 tablet 1    insulin aspart (NOVOLOG PEN) 100 UNIT/ML pen Inject 0-40 Units Subcutaneous 3 times daily (before meals) Per discharge instructions sliding scale 45 mL 2    insulin glargine (LANTUS PEN) 100 UNIT/ML pen Inject 70 Units Subcutaneous At Bedtime 30 mL 2    insulin pen needle (32G X 4 MM) 32G X 4 MM miscellaneous Use as directed by provider. Per insurance coverage 100 each 0    levETIRAcetam (KEPPRA) 1000 MG tablet Take 1 tablet (1,000 mg) by  mouth 2 times daily 60 tablet 11    lidocaine-prilocaine (EMLA) 2.5-2.5 % external cream Apply topically as needed (pain due to port access) Apply to port 30 minutes prior to lab appointment. 30 g 6    lisinopril (ZESTRIL) 40 MG tablet Take 1 tablet (40 mg) by mouth daily 90 tablet 1    methadone (DOLOPHINE) 5 MG tablet Take 0.5 tablets (2.5 mg) by mouth every morning AND 1 tablet (5 mg) at bedtime. 45 tablet 0    methocarbamol (ROBAXIN) 750 MG tablet Take 1 tablet (750 mg) by mouth every 6 hours as needed for muscle spasms 40 tablet 0    naloxone (NARCAN) 4 MG/0.1ML nasal spray Spray 1 spray (4 mg) into one nostril alternating nostrils as needed for opioid reversal every 2-3 minutes until assistance arrives 0.2 mL 3    oxyCODONE IR (ROXICODONE) 10 MG tablet Take 1 tablet (10 mg) by mouth every 3 hours as needed for moderate pain 90 tablet 0    propranolol (INDERAL) 20 MG tablet Take 2 tablets (40 mg) by mouth 3 times daily 180 tablet 1    senna-docusate (SENOKOT-S/PERICOLACE) 8.6-50 MG tablet Take 1 tablet by mouth 2 times daily as needed for constipation (take while on oxycodone) 40 tablet 0    Sharps Container MISC Use as directed to dispose of needles, lancets and other sharps 1 each 0    STATIN NOT PRESCRIBED (INTENTIONAL) Please choose reason not prescribed from choices below.      zolpidem (AMBIEN) 5 MG tablet Take 1 tablet (5 mg) by mouth nightly as needed for sleep 20 tablet 0    albuterol (PROAIR HFA/PROVENTIL HFA/VENTOLIN HFA) 108 (90 Base) MCG/ACT inhaler Inhale 2 puffs into the lungs every 6 hours as needed for shortness of breath, wheezing or cough (Patient not taking: Reported on 7/20/2023) 18 g 0     Past Medical History:   Diagnosis Date    Atypical chest pain 12/02/2013    Cancer (H)     Complication of anesthesia     Diabetes (H)     GERD (gastroesophageal reflux disease) 12/02/2013    History of pulmonary embolism     HTN (hypertension) 05/14/2012    HTN, goal below 140/90 07/02/2013    Insomnia  02/21/2012    Mediastinal lymphadenopathy     Migraine headache 07/02/2013    Migraine with aura, without mention of intractable migraine without mention of status migrainosus     Pneumonia      Past Surgical History:   Procedure Laterality Date    BRONCHOSCOPY RIGID OR FLEXIBLE W/TRANSENDOSCOPIC ENDOBRONCHIAL ULTRASOUND GUIDED Bilateral 1/26/2022    Procedure: Right BRONCHOSCOPY, FIBEROPTIC, endobronchial ultrasound, pleural biopsy;  Surgeon: Dallin Agrawal MD;  Location: [facility] OR    INJECT BLOCK MEDIAL BRANCH CERVICAL/THORACIC/LUMBAR      INSERT CHEST TUBE Right 2/16/2022    Procedure: INSERTION, CATHETER, INTERCOSTAL, FOR DRAINAGE;  Surgeon: Dallin Agrawal MD;  Location: [facility] GI    INSERT CHEST TUBE Right 3/9/2022    Procedure: INSERTION, CATHETER, INTERCOSTAL, FOR DRAINAGE;  Surgeon: Sushila Antonio MD;  Location: [facility] GI    IR CHEST TUBE REMOVAL TUNNELED RIGHT  4/2/2022    OPTICAL TRACKING SYSTEM CRANIOTOMY, EXCISE TUMOR, COMBINED Left 6/28/2022    Procedure: Left stealth craniotomy for tumor resection with motor mapping;  Surgeon: Stephen Moran MD;  Location:  OR    OPTICAL TRACKING SYSTEM CRANIOTOMY, EXCISE TUMOR, COMBINED Right 6/27/2023    Procedure: Right stealth craniotomy and resection of tumor;  Surgeon: Stephen Moran MD;  Location:  OR    ORTHOPEDIC SURGERY      Ganesh. Rotator cuff repair.    PLEUROSCOPY N/A 1/26/2022    Procedure: Pleuroscopy with Pleural Biopsy;  Surgeon: Dallin Agrawal MD;  Location: [facility] OR       Physical Exam:   GENERAL APPEARANCE: pretty healthy looking, spontaneous smiles, alert and no distress; neatly groomed  EYES: Eyes grossly normal to inspection, PERRLA, conjunctivae and sclerae without injection or discharge, EOM intact   RESP:  no increased work of breathing; speaks in complete sentences;   MS: No musculoskeletal defects are noted  SKIN: No suspicious lesions or rashes, hydration status appears adequate with normal skin turgor   PSYCH: Alert and oriented x3;  speech- coherent , normal rate and volume; able to articulate logical thoughts, able to abstract reason, no tangential thoughts, no hallucinations or delusions, mentation appears normal, Mood is euthymic. Affect is appropriate for this mood state and bright. Thought content is free of suicidal ideation, hallucinations, and delusions.  Eye contact is good during conversation.       Key Data Reviewed:  LABS: 10/6/2023- Cr 0.78, Albumin 4.5,  Hgb 14.4,  lipase 120    ECG:  QTc 462 on  (was 482 in May 2023)  IMAGIN2023 CT CAP W/CONTRAST  IMPRESSION:  1.  Stable right lower lobe dominant nodular opacity. No new disease  in the chest, abdomen and pelvis.    26 minutes spent on the date of the encounter doing chart review, history and exam, patient education & counseling, documentation and other activities as noted above. MDM moderate complexity--monitoring three stable, chronic conditions; review of lab and imaging results and brief counseling about goals of care and ACP document completion.    Ajith Brewer MD MS FAAFP CAQHPM  [facility]  Office [phone]  Fax [phone]

## 2023-10-23 NOTE — NURSING NOTE
Is the patient currently in the state of [place]? YES    Visit mode:VIDEO    If the visit is dropped, the patient can be reconnected by: VIDEO VISIT: Send to e-mail at: [email or web address]    Will anyone else be joining the visit? NO  (If patient encounters technical issues they should call [phone]    How would you like to obtain your AVS? MyChart    Are changes needed to the allergy or medication list? No    Reason for visit: Video Visit (Follow Up )    Celi OCONNOR

## 2023-10-23 NOTE — PATIENT INSTRUCTIONS
"It was good to see you today, Connor.  I'm glad things are going well for you and your mood is better..    Here are the things we talked about:  Let's not mess with success--keep up with your current medicines.  I'll be eagerly waiting to see what your PET scan looks like in December.    Someone from the team will reach out to schedule a follow up appointment in 8 weeks.    We talked about starting to look at completing a healthcare directive also known as a living will.  I recommend you check out- [email or web address]     They have several tools and some advice about getting started.  Under the \"How do I document my choices\" section, I am a fan of the full length healthcare directive (for adults with serious illness) because I think it has a good combination of the medical questions that are important as well as the social and personal questions that allow healthcare providers to get to know you as a person if you are unable to speak for yourself.  You do not have to complete every question on the document.  I generally recommend patients start with 3 or 4 questions on the goals page and work from there.     There is also a Short Form, which primarily serves to designate health care agents. These are people who can speak on your behalf about your healthcare wishes/goals if you are not able to speak for yourself.     How to get a hold of us:  For non-urgent matters, MyChart works best.    For more urgent matters, or if you prefer not to use MyChart, call our clinic nurse coordinator Reyna Ma RN at [phone]    We have an on-call number for evenings and weekends. Please call this only if you are having uncontrolled symptoms or serious side effects from your medicines: [phone].     For refills, please give us a week (5 working days) notice. We don't always have providers available everyday to do refills. If you call the day you run out of your " medicine, we may not be able to refill it in time, so call 5 days in advance!    Ajith Brewer MD MS FAAFP CAQHPM  [facility]  Office [phone]  Fax [phone]

## 2023-10-25 ENCOUNTER — HOSPITAL ENCOUNTER (OUTPATIENT)
Dept: MRI IMAGING | Facility: CLINIC | Age: 43
Discharge: HOME | End: 2023-10-25
Attending: STUDENT IN AN ORGANIZED HEALTH CARE EDUCATION/TRAINING PROGRAM | Admitting: STUDENT IN AN ORGANIZED HEALTH CARE EDUCATION/TRAINING PROGRAM
Payer: COMMERCIAL

## 2023-10-25 DIAGNOSIS — I67.89 RADIATION THERAPY INDUCED BRAIN NECROSIS: ICD-10-CM

## 2023-10-25 DIAGNOSIS — C34.31 MALIGNANT NEOPLASM OF LOWER LOBE OF RIGHT LUNG (H): ICD-10-CM
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
## 2023-10-25 PROCEDURE — 70553 MRI BRAIN STEM W/O & W/DYE: CPT

## 2023-10-25 PROCEDURE — A9585 GADOBUTROL INJECTION: HCPCS | Performed by: STUDENT IN AN ORGANIZED HEALTH CARE EDUCATION/TRAINING PROGRAM

## 2023-10-25 PROCEDURE — 255N000002 HC RX 255 OP 636: Performed by: STUDENT IN AN ORGANIZED HEALTH CARE EDUCATION/TRAINING PROGRAM

## 2023-10-25 RX ORDER — GADOBUTROL 604.72 MG/ML
15 INJECTION INTRAVENOUS ONCE
Status: COMPLETED | OUTPATIENT
Start: 2023-10-25 | End: 2023-10-25

## 2023-10-25 RX ADMIN — GADOBUTROL 15 ML: 604.72 INJECTION INTRAVENOUS at 12:22

## 2023-10-26 ENCOUNTER — MYC REFILL (OUTPATIENT)
Dept: RADIATION ONCOLOGY | Facility: HOSPITAL | Age: 43
End: 2023-10-26
Payer: MEDICAID

## 2023-10-26 DIAGNOSIS — I10 PRIMARY HYPERTENSION: ICD-10-CM

## 2023-10-26 DIAGNOSIS — Z98.890 S/P CRANIOTOMY: ICD-10-CM

## 2023-10-26 DIAGNOSIS — D49.6 BRAIN TUMOR (H): ICD-10-CM

## 2023-10-26 RX ORDER — OXYCODONE HYDROCHLORIDE 10 MG/1
10 TABLET ORAL
Qty: 90 TABLET | Refills: 0 | Status: SHIPPED | OUTPATIENT
Start: 2023-10-26 | End: 2023-11-08

## 2023-10-26 RX ORDER — HYDROCHLOROTHIAZIDE 50 MG/1
50 TABLET ORAL DAILY
Qty: 30 TABLET | Refills: 1 | Status: SHIPPED | OUTPATIENT
Start: 2023-10-26 | End: 2023-11-17

## 2023-10-26 NOTE — TELEPHONE ENCOUNTER
Received [employer] message from patient requesting refill of oxycodone.     Last refill: 10/12/23  Last office visit: 10/23/23  Scheduled for follow up per check out request, planned in 8 weeks.     Will route request to MD for review.     Reviewed [place]  Report.

## 2023-10-27 ENCOUNTER — APPOINTMENT (OUTPATIENT)
Dept: LAB | Facility: CLINIC | Age: 43
End: 2023-10-27
Attending: PHYSICIAN ASSISTANT
Payer: COMMERCIAL

## 2023-10-27 ENCOUNTER — INFUSION THERAPY VISIT (OUTPATIENT)
Dept: ONCOLOGY | Facility: CLINIC | Age: 43
End: 2023-10-27
Attending: PHYSICIAN ASSISTANT
Payer: COMMERCIAL

## 2023-10-27 ENCOUNTER — LAB (OUTPATIENT)
Dept: LAB | Facility: CLINIC | Age: 43
End: 2023-10-27
Payer: COMMERCIAL

## 2023-10-27 VITALS
OXYGEN SATURATION: 99 % | WEIGHT: 216.7 LBS | SYSTOLIC BLOOD PRESSURE: 145 MMHG | HEART RATE: 50 BPM | DIASTOLIC BLOOD PRESSURE: 97 MMHG | TEMPERATURE: 98.4 F | BODY MASS INDEX: 32 KG/M2 | RESPIRATION RATE: 16 BRPM

## 2023-10-27 DIAGNOSIS — N30.01 ACUTE CYSTITIS WITH HEMATURIA: ICD-10-CM

## 2023-10-27 DIAGNOSIS — R31.0 GROSS HEMATURIA: ICD-10-CM

## 2023-10-27 DIAGNOSIS — C78.01 MALIGNANT NEOPLASM METASTATIC TO BOTH LUNGS (H): ICD-10-CM

## 2023-10-27 DIAGNOSIS — D49.6 BRAIN TUMOR (H): ICD-10-CM

## 2023-10-27 DIAGNOSIS — C34.31 MALIGNANT NEOPLASM OF LOWER LOBE OF RIGHT LUNG (H): Primary | ICD-10-CM

## 2023-10-27 DIAGNOSIS — Z79.899 ENCOUNTER FOR LONG-TERM (CURRENT) USE OF MEDICATIONS: ICD-10-CM

## 2023-10-27 DIAGNOSIS — C34.90 NON-SMALL CELL LUNG CANCER (NSCLC) (H): ICD-10-CM

## 2023-10-27 DIAGNOSIS — C79.31 MALIGNANT NEOPLASM METASTATIC TO BRAIN (H): ICD-10-CM
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
## 2023-10-27 DIAGNOSIS — I26.99 PULMONARY EMBOLISM, UNSPECIFIED CHRONICITY, UNSPECIFIED PULMONARY EMBOLISM TYPE, UNSPECIFIED WHETHER ACUTE COR PULMONALE PRESENT (H): ICD-10-CM

## 2023-10-27 DIAGNOSIS — C78.02 MALIGNANT NEOPLASM METASTATIC TO BOTH LUNGS (H): ICD-10-CM

## 2023-10-27 DIAGNOSIS — I67.89 RADIATION THERAPY INDUCED BRAIN NECROSIS: ICD-10-CM

## 2023-10-27 LAB
ALBUMIN MFR UR ELPH: 21.8 MG/DL
ALBUMIN SERPL BCG-MCNC: 4.4 G/DL (ref 3.5–5.2)
ALBUMIN UR-MCNC: 50 MG/DL
ALP SERPL-CCNC: 72 U/L (ref 40–129)
ALT SERPL W P-5'-P-CCNC: 14 U/L (ref 0–70)
AMYLASE SERPL-CCNC: 168 U/L (ref 28–100)
ANION GAP SERPL CALCULATED.3IONS-SCNC: 10 MMOL/L (ref 7–15)
APPEARANCE UR: CLEAR
AST SERPL W P-5'-P-CCNC: 25 U/L (ref 0–45)
BASOPHILS # BLD AUTO: 0.1 10E3/UL (ref 0–0.2)
BASOPHILS NFR BLD AUTO: 1 %
BILIRUB SERPL-MCNC: 0.5 MG/DL
BILIRUB UR QL STRIP: NEGATIVE
BUN SERPL-MCNC: 13.1 MG/DL (ref 6–20)
CALCIUM SERPL-MCNC: 9.3 MG/DL (ref 8.6–10)
CHLORIDE SERPL-SCNC: 104 MMOL/L (ref 98–107)
CK SERPL-CCNC: 146 U/L (ref 39–308)
COLLECT DURATION TIME UR: 26 H
COLOR UR AUTO: YELLOW
CREAT SERPL-MCNC: 0.82 MG/DL (ref 0.67–1.17)
DEPRECATED HCO3 PLAS-SCNC: 30 MMOL/L (ref 22–29)
EGFRCR SERPLBLD CKD-EPI 2021: >90 ML/MIN/1.73M2
EOSINOPHIL # BLD AUTO: 0.7 10E3/UL (ref 0–0.7)
EOSINOPHIL NFR BLD AUTO: 8 %
ERYTHROCYTE [DISTWIDTH] IN BLOOD BY AUTOMATED COUNT: 13 % (ref 10–15)
GLUCOSE SERPL-MCNC: 101 MG/DL (ref 70–99)
GLUCOSE UR STRIP-MCNC: NEGATIVE MG/DL
HCT VFR BLD AUTO: 39.8 % (ref 40–53)
HGB BLD-MCNC: 13.9 G/DL (ref 13.3–17.7)
HGB UR QL STRIP: NEGATIVE
HYALINE CASTS: 165 /LPF
IMM GRANULOCYTES # BLD: 0 10E3/UL
IMM GRANULOCYTES NFR BLD: 0 %
KETONES UR STRIP-MCNC: ABNORMAL MG/DL
LEUKOCYTE ESTERASE UR QL STRIP: ABNORMAL
LIPASE SERPL-CCNC: 283 U/L (ref 13–60)
LYMPHOCYTES # BLD AUTO: 2.7 10E3/UL (ref 0.8–5.3)
LYMPHOCYTES NFR BLD AUTO: 31 %
MAGNESIUM SERPL-MCNC: 1.7 MG/DL (ref 1.7–2.3)
MCH RBC QN AUTO: 31.3 PG (ref 26.5–33)
MCHC RBC AUTO-ENTMCNC: 34.9 G/DL (ref 31.5–36.5)
MCV RBC AUTO: 90 FL (ref 78–100)
MONOCYTES # BLD AUTO: 0.6 10E3/UL (ref 0–1.3)
MONOCYTES NFR BLD AUTO: 7 %
MUCOUS THREADS #/AREA URNS LPF: PRESENT /LPF
NEUTROPHILS # BLD AUTO: 4.7 10E3/UL (ref 1.6–8.3)
NEUTROPHILS NFR BLD AUTO: 53 %
NITRATE UR QL: NEGATIVE
NRBC # BLD AUTO: 0 10E3/UL
NRBC BLD AUTO-RTO: 0 /100
PH UR STRIP: 5.5 [PH] (ref 5–7)
PLATELET # BLD AUTO: 228 10E3/UL (ref 150–450)
POTASSIUM SERPL-SCNC: 3.5 MMOL/L (ref 3.4–5.3)
PROT 24H UR-MRATE: 134.22 MG/SPECIMEN
PROT SERPL-MCNC: 6.9 G/DL (ref 6.4–8.3)
RBC # BLD AUTO: 4.44 10E6/UL (ref 4.4–5.9)
RBC URINE: 3 /HPF
SODIUM SERPL-SCNC: 144 MMOL/L (ref 135–145)
SP GR UR STRIP: 1.03 (ref 1–1.03)
SPECIMEN VOL UR: 667 ML
SQUAMOUS EPITHELIAL: 1 /HPF
UROBILINOGEN UR STRIP-MCNC: NORMAL MG/DL
WBC # BLD AUTO: 8.7 10E3/UL (ref 4–11)
WBC URINE: 11 /HPF

## 2023-10-27 PROCEDURE — 84156 ASSAY OF PROTEIN URINE: CPT | Performed by: PATHOLOGY

## 2023-10-27 PROCEDURE — 82150 ASSAY OF AMYLASE: CPT | Performed by: PHYSICIAN ASSISTANT

## 2023-10-27 PROCEDURE — 250N000011 HC RX IP 250 OP 636: Mod: JZ | Performed by: PHYSICIAN ASSISTANT

## 2023-10-27 PROCEDURE — 96413 CHEMO IV INFUSION 1 HR: CPT

## 2023-10-27 PROCEDURE — 82550 ASSAY OF CK (CPK): CPT | Performed by: PHYSICIAN ASSISTANT

## 2023-10-27 PROCEDURE — 81001 URINALYSIS AUTO W/SCOPE: CPT

## 2023-10-27 PROCEDURE — 83690 ASSAY OF LIPASE: CPT | Performed by: PHYSICIAN ASSISTANT

## 2023-10-27 PROCEDURE — 83735 ASSAY OF MAGNESIUM: CPT | Performed by: PHYSICIAN ASSISTANT

## 2023-10-27 PROCEDURE — 81050 URINALYSIS VOLUME MEASURE: CPT | Performed by: PATHOLOGY

## 2023-10-27 PROCEDURE — 99215 OFFICE O/P EST HI 40 MIN: CPT | Performed by: PHYSICIAN ASSISTANT

## 2023-10-27 PROCEDURE — 258N000003 HC RX IP 258 OP 636: Performed by: PHYSICIAN ASSISTANT

## 2023-10-27 PROCEDURE — 36415 COLL VENOUS BLD VENIPUNCTURE: CPT | Performed by: PHYSICIAN ASSISTANT

## 2023-10-27 PROCEDURE — G0463 HOSPITAL OUTPT CLINIC VISIT: HCPCS | Performed by: PHYSICIAN ASSISTANT

## 2023-10-27 PROCEDURE — 87086 URINE CULTURE/COLONY COUNT: CPT

## 2023-10-27 PROCEDURE — 80053 COMPREHEN METABOLIC PANEL: CPT | Performed by: PHYSICIAN ASSISTANT

## 2023-10-27 PROCEDURE — 85025 COMPLETE CBC W/AUTO DIFF WBC: CPT | Performed by: PHYSICIAN ASSISTANT

## 2023-10-27 RX ORDER — DIPHENHYDRAMINE HYDROCHLORIDE 50 MG/ML
50 INJECTION INTRAMUSCULAR; INTRAVENOUS
Status: CANCELLED
Start: 2023-10-27

## 2023-10-27 RX ORDER — ALBUTEROL SULFATE 90 UG/1
1-2 AEROSOL, METERED RESPIRATORY (INHALATION)
Status: CANCELLED
Start: 2023-10-27

## 2023-10-27 RX ORDER — MEPERIDINE HYDROCHLORIDE 25 MG/ML
25 INJECTION INTRAMUSCULAR; INTRAVENOUS; SUBCUTANEOUS EVERY 30 MIN PRN
Status: CANCELLED | OUTPATIENT
Start: 2023-10-27

## 2023-10-27 RX ORDER — METHYLPREDNISOLONE SODIUM SUCCINATE 125 MG/2ML
125 INJECTION, POWDER, LYOPHILIZED, FOR SOLUTION INTRAMUSCULAR; INTRAVENOUS
Status: CANCELLED
Start: 2023-10-27

## 2023-10-27 RX ORDER — ALBUTEROL SULFATE 0.83 MG/ML
2.5 SOLUTION RESPIRATORY (INHALATION)
Status: CANCELLED | OUTPATIENT
Start: 2023-10-27

## 2023-10-27 RX ORDER — EPINEPHRINE 1 MG/ML
0.3 INJECTION, SOLUTION INTRAMUSCULAR; SUBCUTANEOUS EVERY 5 MIN PRN
Status: CANCELLED | OUTPATIENT
Start: 2023-10-27

## 2023-10-27 RX ORDER — HEPARIN SODIUM (PORCINE) LOCK FLUSH IV SOLN 100 UNIT/ML 100 UNIT/ML
5 SOLUTION INTRAVENOUS
Status: CANCELLED | OUTPATIENT
Start: 2023-10-27

## 2023-10-27 RX ORDER — HEPARIN SODIUM,PORCINE 10 UNIT/ML
5-20 VIAL (ML) INTRAVENOUS DAILY PRN
Status: CANCELLED | OUTPATIENT
Start: 2023-10-27

## 2023-10-27 RX ORDER — LORAZEPAM 2 MG/ML
0.5 INJECTION INTRAMUSCULAR EVERY 4 HOURS PRN
Status: CANCELLED | OUTPATIENT
Start: 2023-10-27

## 2023-10-27 RX ADMIN — SODIUM CHLORIDE 250 ML: 9 INJECTION, SOLUTION INTRAVENOUS at 13:11

## 2023-10-27 RX ADMIN — BEVACIZUMAB 1400 MG: 400 INJECTION, SOLUTION INTRAVENOUS at 13:49

## 2023-10-27 ASSESSMENT — PAIN SCALES - GENERAL: PAINLEVEL: NO PAIN (0)

## 2023-10-27 NOTE — LETTER
10/27/2023         RE: Connor Emerson  [address]  [facility] [address]        Dear Colleague,    Thank you for referring your patient, Connor Emerson, to the [facility]. Please see a copy of my visit note below.    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 He is currently undergoing treatment with brigatinib and bevacizumab. Please see previous note by Dr. Persaud for further details on the patient's history.     He comes in today for bevacizumab, C5    Interval History:  -Still having altered sleep.  Very tired after work, last night slept 4:30-10:30 pm and then was up all night.  Unable to fall back asleep.   This is causing altered medication timing and he's had a couple days of poor pain control as well.   - started the cymbalta, this has helped with the muscle/joint pain. He could tell a difference that his pain was less from this.  -Connor feels he is doing ok today. His BP readings 140-150/.  (They were in the 170/100 + range) he has been taking the propanolol 40mg BID, lisinopril 40 mg and hydrochlorothiazide to 50mg. No headaches.   -Had one brief episode of blood in urine 1 x with C3 and 1 x C4  -He follows with palliative for pain control.  Taking methadone 1 in the AM and 1 PM, oxycodone 2 at lunch and 2 bedtime.  He cut out the morning oxycodone to see if he would feel less tired.  He uses celebrex in the AM.   -No speech changes, vision changes, paresthesias, focal weakness, leg swelling, or activity concerning for seizures.  -No chest pain or SOB.   -No cough or fever.  -Having regular BM;s, last this AM. No abdominal pain.      Physical Examination:  BP (!) 145/97 (BP Location: Right arm, Patient Position:  Sitting, Cuff Size: Adult Large)   Pulse 50   Temp 98.4  F (36.9  C) (Oral)   Resp 16   Wt 98.3 kg (216 lb 11.2 oz)   SpO2 99%   BMI 32.00 kg/m    Wt Readings from Last 4 Encounters:   10/27/23 98.3 kg (216 lb 11.2 oz)   10/23/23 97.5 kg (215 lb)   10/06/23 97.9 kg (215 lb 14.4 oz)   09/15/23 98.3 kg (216 lb 11.2 oz)         General: Well-appearing male in no acute distress.  Eyes: EOMI, PERRL. No scleral icterus or conjunctival injection.  ENT: Oral mucosa is moist without lesions or thrush.   Lymphatic: Neck is supple without cervical or supraclavicular lymphadenopathy.   Cardiovascular: RRR No murmurs. No peripheral edema.  Respiratory: CTA bilaterally. No wheezes or crackles.  Gastrointestinal: BS +. Abdomen rounded, soft, non-tender.   Neurologic: Cranial nerves II through XII are grossly intact.   Skin: Peeling to skin left hand without associated erythema. No rashes, petechiae, or bruising noted on exposed skin.   Psych: Alert. Pleasant. Comprehension intact.   Laboratory Data:  Most Recent 3 CBC's:  Recent Labs   Lab Test 10/27/23  1056 10/06/23  0907 09/15/23  1233   WBC 8.7 8.6 9.9   HGB 13.9 14.4 14.2   MCV 90 89 88    187 250   ANEUTAUTO 4.7 4.2 5.0    Most Recent 3 BMP's:  Recent Labs   Lab Test 10/27/23  1056 10/06/23  0907 09/15/23  1233    143 144   POTASSIUM 3.5 3.5 3.8   CHLORIDE 104 104 102   CO2 30* 29 31*   BUN 13.1 22.3* 21.3*   CR 0.82 0.78 0.78   ANIONGAP 10 10 11   TORY 9.3 9.5 9.9   * 138* 124*   PROTTOTAL 6.9 6.8 6.9   ALBUMIN 4.4 4.5 4.5    Most Recent 2 LFT's:  Recent Labs   Lab Test 10/27/23  1056 10/06/23  0907   AST 25 16   ALT 14 11   ALKPHOS 72 72   BILITOTAL 0.5 0.5    Most Recent TSH and T4:  Recent Labs   Lab Test 09/12/22  1642   TSH 2.56   Reviewed lipase, amylase, CK, and magnesium. Lipase elevated slightly.   I reviewed the above labs today.    Imaging:  MR Brain w/o & w Contrast  Narrative: MRI BRAIN WITHOUT AND WITH CONTRAST  10/25/2023 1:10 PM      HISTORY:  Hx of brain mets and radiation necrosis; Headache;  Neurologic deficit, non-traumatic; Language deficit; Neurologic  deficit onset <= 24 hours; No known/automatically detected potential  contraindications to iodinated contrast; Malignant neoplasm of lower  lobe of right lung (H); Radiation therapy induced brain necrosis;  Radiation therapy induced brain necrosis     TECHNIQUE:  Multiplanar, multisequence MRI of the brain without and  with 15 mL Gadavist, including whole brain perfusion imaging.     COMPARISON: Multiple previous brain MRIs, most recent 7/27/2023.     FINDINGS: Again seen are postoperative changes of right frontal  craniotomy. A resection cavity in the right frontal lobe is again  noted. The cystic resection cavity appears smaller compared to the  prior exam. Surrounding the right frontal lobe resection cavity along  its posterior medial and inferior margins, there is T2 FLAIR  hyperintense, heterogeneously enhancing nodular soft tissue, abutting  the frontal horn of the right lateral ventricle. Compared to the most  recent study, this nodular enhancing tissue along the posterior margin  of the resection cavity appears mildly increased size/radial  thickness. The tissue now shows more discrete T2 FLAIR hyperintense  signal (series 4 image 16), new intrinsic T1 hyperintense signal, and  restricted diffusion (series 2 image 55). It measures up to  approximately 12-13 mm in radial thickness. There is no evidence for  increased cerebral blood volume in this location on the perfusion  imaging.    Again seen are changes of left anterior parietal craniotomy. Unchanged  irregular parenchymal enhancement in the left frontal lobe underlying  the resection cavity, favored to reflect posttreatment changes (series  14 image 132).    Multiple additional small scattered supratentorial and infratentorial  metastatic lesions are similar to slightly decreased in size overall.  Ring-enhancing lesion in the  left cerebellar hemisphere with central  DWI hyperintense signal it measures 12 mm x 8 mm compared to 13 mm x  10 mm previously. A couple of adjacent left cerebellar enhancing  lesions are smaller/less conspicuous as well. Heterogeneously  enhancing nodular lesion in the inferolateral right temporal lobe is  decreased in size and conspicuity (series 14 image 52) measuring 10 mm  x 9 mm the axial plane compared to 14 mm x 9 mm previously. Enhancing  cortical/subcortical nodule in the left parietal lobe (series 14 image  109) measuring approximately 4-5 mm appears slightly increased in  conspicuity compared to most recent study, but relatively similar to  exam of 6/28/2023. Enhancing lesion in the right parietal lobe  measuring 2-3 mm (series 14 image 133) appears relatively similar to  exam of 6/28/2023, but mildly increased in conspicuity compared to  7/27/2023. A previously seen left frontal white matter metastasis  evident on MRI of 6/24/2023 is not well seen on the current study;  this is similar to the most recent study.    Vasogenic edema is seen in the right frontal lobe on the previous exam  has significantly decreased. The associated mass effect is decreased.  There is no longer any midline shift. There is mild residual vasogenic  edema/T2 FLAIR hyperintense signal in the right frontal lobe without  significant mass effect. No hydrocephalus. Multiple small scattered  foci of hemosiderin staining involving previously demonstrated  metastatic lesions and the margins of the right frontal resection  cavity appear unchanged. No acute intracranial hemorrhage or  extra-axial fluid collection. Mild regional dural thickening in the  region of the right sided craniotomy flap is likely reactive,  unchanged.    Orbits appear unremarkable. The paranasal sinuses are free of  significant disease. The not operative calvarium, skull base, and  midface otherwise appear grossly unremarkable.  Impression: IMPRESSION:  1.  Redemonstrated postoperative changes of right frontal craniotomy.  Decreased size of the right frontal resection cavity with significant  decrease in the surrounding right frontal lobe vasogenic edema seen on  the previous study. Mass effect has essentially resolved in the  interim and there is no longer any midline shift.  2. In the interim, slightly increased thickness of a rind of  peripheral nodular enhancement along the posterior inferior and medial  aspects of the right frontal lobe resection cavity, indeterminate.  There is no significant elevated cerebral blood volume in this area.  The findings may represent evolving posttreatment changes; however,  residual tumor is not excluded. Recommend close interval follow-up.  3. Stable postoperative changes status post left anterior parietal  craniotomy with irregular enhancement in the left frontal lobe  parenchyma underlying the resection cavity, likely due to  posttreatment change.  4. Other scattered supratentorial and infratentorial metastatic  lesions are overall similar to slightly decreased in size, as  described.  5. No acute intracranial process.    JM SHI MD         SYSTEM ID:  [id number]    I reviewed the above imaging today.    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   -lipase 283 today, grade 3 elevation. No symptoms of abdominal pain, N/V.  Discussed HOLD x 1 week and recheck labs next week. Discussed with DR Persaud  -recent imaging in September stable, next images in December  - urine protein elevated.  Will hold off on 24 hour urine unless >100.  He only has 1 more dose left    NEURO  # Brain mets: reviewed brain MRI today, stable  # Radiation necrosis  -Baseline Brain MRI with several brain mets, s/p GK to 11-12 brain mets.  F/u Brain MRI in June 2022 was showing enlargement of the one of the lesions along with edema, therefore had to undergo craniotomy followed by resection, the final biopsy consistent with radiation necrosis.   -currently on bevacizumab for radiation necrosis --15 mg/kg every 3 weeks.    (Of note, Connor S/p 2 doses of Bevacixumab fall of 2022, stopped due HTN urgency and PE.) MRI in 4/2023 now showing contrast enhancement of lesions and a dominate lesion in the Rfrontal region with edema, several other smaller lesion. Short term MRI showing increase in size of the rt frontal lesion, underwent resection, patho again showing radiation necrosis.  Reviewed this is improved on October imaging  -BP has been more consistent in the 140/90 range.  Will ask him to continue to monitor 1-2 x daily at home.    - continue propanolol to 40 mg TID, 50 mg of hydrochlorothiazide and lisinopril 40 mg daily.  If needed could consider CCB or hydralazine.   -Check BP twice daily. Educated on risk of stroke due to uncontrolled BP and warning signs of this that warrant immediate care.   # Headache  -Present since recent craniotomy on 6/27/23. MRI brain on 7/27/23 with reduced vasogenic edema. Small dose of Dex + taper in August, these have resolved.  No further headaches      Elevated Lipase  -Elevated at 283. Grade 3, as noted above, will hold until a grade 1, then resume.    Diabetes, Type 2  Reports improved control. Currently running about 150-170 at home  --on Metformin and insulin    PE: provoked by malignancy vs bevacizumab in 11/2022  -- on rivaroxiabn 20 mg daily  -- of note, Connor not taking this.  From notes I could find he stopped (?unsure why) in April.  Given hypercoagulability of cancer and h/o of clotting on Avastin, will restart.    Grade 2-3 arthralgias  All joints affected, no morning stiffness, normal ESR and CRP  Recently has noticed more fatigue and stifnnes in body.  2/2 to brigatinib and possible worsening with  Avastin.   -following with palliative;   -on methadone BID;Celecoxib 100 mg po BID  -I switched him from Celexa to Cymbalta.  This has helped his arthralgias.  He is taking 30 mg once daily, encouraged him to increase to BID, given his tolerability and that its helping.  He was able to drop his oxycodone to twice a day (vs TID)    Hematuria: once and cleared.  Will get a UA/UC    50 minutes spent on the date of the encounter doing chart review, review of test results, interpretation of tests, patient visit, and documentation  and discussion of plan with Dr Cori Navarro PA-C  [facility]  [address] [place] [id number] [phone]

## 2023-10-27 NOTE — NURSING NOTE
Chief Complaint   Patient presents with    Blood Draw     Labs drawn via PIV placed by RN. VS taken.     Labs drawn from PIV placed by RN. Line flushed with saline. Vitals taken. Pt checked in for appointment(s).     Pt's BP high 145/97; pt reports hx of high BP, and states he is on BP meds; pt is asymptomatic at this time.    Pt directed downstairs to drop off 24hr urine jug on 1st floor lab.    Pt unable to produce random urine at this time, specimen cup and label sent with pt.    Message(s) sent to provider r/e statements above.    Pennie Santana RN

## 2023-10-27 NOTE — NURSING NOTE
"Oncology Rooming Note    October 27, 2023 11:22 AM   Connor Emerson is a 43 year old male who presents for:    Chief Complaint   Patient presents with    Blood Draw     Labs drawn via PIV placed by RN. VS taken.    Oncology Clinic Visit     RTN for Non Small Cell Lung Cancer     Initial Vitals: BP (!) 145/97 (BP Location: Right arm, Patient Position: Sitting, Cuff Size: Adult Large)   Pulse 50   Temp 98.4  F (36.9  C) (Oral)   Resp 16   Wt 98.3 kg (216 lb 11.2 oz)   SpO2 99%   BMI 32.00 kg/m   Estimated body mass index is 32 kg/m  as calculated from the following:    Height as of 10/23/23: 1.753 m (5' 9\").    Weight as of this encounter: 98.3 kg (216 lb 11.2 oz). Body surface area is 2.19 meters squared.  No Pain (0) Comment: Data Unavailable   No LMP for male patient.  Allergies reviewed: Yes  Medications reviewed: Yes    Medications: Medication refills not needed today.  Pharmacy name entered into EPIC:    [facility] [place] [place] - [address]  [facility] - [place], [place] - [address] [facility], [place] - [address]    Clinical concerns: none       Luisa Mclain MA             "

## 2023-10-27 NOTE — PROGRESS NOTES
Infusion Nursing Note:  Connor Emerson presents today for C5D1 Avastin.    Patient seen by provider today: Yes: Sarina REYES   present during visit today: Not Applicable.    Note: Instruction given to patient as per provider. Verbalized understanding.        Intravenous Access:  Peripheral IV placed.    Treatment Conditions:  Lab Results   Component Value Date    HGB 13.9 10/27/2023    WBC 8.7 10/27/2023    ANEU 7.8 08/20/2016    ANEUTAUTO 4.7 10/27/2023     10/27/2023        Lab Results   Component Value Date     10/27/2023    POTASSIUM 3.5 10/27/2023    MAG 1.7 10/27/2023    CR 0.82 10/27/2023    TORY 9.3 10/27/2023    BILITOTAL 0.5 10/27/2023    ALBUMIN 4.4 10/27/2023    ALT 14 10/27/2023    AST 25 10/27/2023       Urine 50.    TORB: 10/27/23/1034/Sarina REYES/Kareen Gonzales RN/ Please collect specimen for Urine protein and U/A.     TORB: 10/27/23/1034h/Sarina REYES/Kareen Gonzales RN/ Amylase 165, Lipase 283, Hold Brigatinib for one and recheck labs. Continue with Blood thinner.  Urine protein 50, No need for 24 hour urine collection.     Post Infusion Assessment:  Patient tolerated infusion without incident.  Blood return noted pre and post infusion.  Site patent and intact, free from redness, edema or discomfort.  No evidence of extravasations.  Access discontinued per protocol.       Discharge Plan:   Patient declined prescription refills.  Discharge instructions reviewed with: Patient.  Patient and/or family verbalized understanding of discharge instructions and all questions answered.  AVS to patient via [employer].  Patient will return 11/17/23 for next appointment.   Patient discharged in stable condition accompanied by: self.  Departure Mode: Ambulatory.      LEONA SAUCEDO RN

## 2023-10-28 LAB — BACTERIA UR CULT: ABNORMAL

## 2023-10-30 RX ORDER — CEFDINIR 300 MG/1
300 CAPSULE ORAL 2 TIMES DAILY
Qty: 7 CAPSULE | Refills: 0 | Status: SHIPPED | OUTPATIENT
Start: 2023-10-30 | End: 2023-11-17

## 2023-11-06 ENCOUNTER — LAB (OUTPATIENT)
Dept: LAB | Facility: CLINIC | Age: 43
End: 2023-11-06
Payer: COMMERCIAL

## 2023-11-06 DIAGNOSIS — C79.31 MALIGNANT NEOPLASM METASTATIC TO BRAIN (H): ICD-10-CM

## 2023-11-06 DIAGNOSIS — C34.90 NON-SMALL CELL LUNG CANCER (NSCLC) (H): ICD-10-CM

## 2023-11-06 DIAGNOSIS — Z79.899 ENCOUNTER FOR LONG-TERM (CURRENT) USE OF MEDICATIONS: ICD-10-CM

## 2023-11-06 LAB
ALBUMIN SERPL BCG-MCNC: 4.5 G/DL (ref 3.5–5.2)
ALP SERPL-CCNC: 70 U/L (ref 40–129)
ALT SERPL W P-5'-P-CCNC: 14 U/L (ref 0–70)
AMYLASE SERPL-CCNC: 92 U/L (ref 28–100)
ANION GAP SERPL CALCULATED.3IONS-SCNC: 11 MMOL/L (ref 7–15)
AST SERPL W P-5'-P-CCNC: 16 U/L (ref 0–45)
BASOPHILS # BLD AUTO: 0.1 10E3/UL (ref 0–0.2)
BASOPHILS NFR BLD AUTO: 1 %
BILIRUB SERPL-MCNC: 0.3 MG/DL
BUN SERPL-MCNC: 14.2 MG/DL (ref 6–20)
CALCIUM SERPL-MCNC: 9 MG/DL (ref 8.6–10)
CHLORIDE SERPL-SCNC: 103 MMOL/L (ref 98–107)
CK SERPL-CCNC: 93 U/L (ref 39–308)
CREAT SERPL-MCNC: 0.86 MG/DL (ref 0.67–1.17)
DEPRECATED HCO3 PLAS-SCNC: 29 MMOL/L (ref 22–29)
EGFRCR SERPLBLD CKD-EPI 2021: >90 ML/MIN/1.73M2
EOSINOPHIL # BLD AUTO: 0.6 10E3/UL (ref 0–0.7)
EOSINOPHIL NFR BLD AUTO: 5 %
ERYTHROCYTE [DISTWIDTH] IN BLOOD BY AUTOMATED COUNT: 13.2 % (ref 10–15)
GLUCOSE SERPL-MCNC: 143 MG/DL (ref 70–99)
HCT VFR BLD AUTO: 40.3 % (ref 40–53)
HGB BLD-MCNC: 13.7 G/DL (ref 13.3–17.7)
IMM GRANULOCYTES # BLD: 0 10E3/UL
IMM GRANULOCYTES NFR BLD: 0 %
LIPASE SERPL-CCNC: 82 U/L (ref 13–60)
LYMPHOCYTES # BLD AUTO: 2.9 10E3/UL (ref 0.8–5.3)
LYMPHOCYTES NFR BLD AUTO: 26 %
MCH RBC QN AUTO: 31.8 PG (ref 26.5–33)
MCHC RBC AUTO-ENTMCNC: 34 G/DL (ref 31.5–36.5)
MCV RBC AUTO: 94 FL (ref 78–100)
MONOCYTES # BLD AUTO: 0.9 10E3/UL (ref 0–1.3)
MONOCYTES NFR BLD AUTO: 8 %
NEUTROPHILS # BLD AUTO: 6.8 10E3/UL (ref 1.6–8.3)
NEUTROPHILS NFR BLD AUTO: 60 %
NRBC # BLD AUTO: 0 10E3/UL
NRBC BLD AUTO-RTO: 0 /100
PLATELET # BLD AUTO: 205 10E3/UL (ref 150–450)
POTASSIUM SERPL-SCNC: 4 MMOL/L (ref 3.4–5.3)
PROT SERPL-MCNC: 6.5 G/DL (ref 6.4–8.3)
RBC # BLD AUTO: 4.31 10E6/UL (ref 4.4–5.9)
SODIUM SERPL-SCNC: 143 MMOL/L (ref 135–145)
WBC # BLD AUTO: 11.3 10E3/UL (ref 4–11)

## 2023-11-06 PROCEDURE — 80053 COMPREHEN METABOLIC PANEL: CPT

## 2023-11-06 PROCEDURE — 85025 COMPLETE CBC W/AUTO DIFF WBC: CPT

## 2023-11-06 PROCEDURE — 82150 ASSAY OF AMYLASE: CPT

## 2023-11-06 PROCEDURE — 36415 COLL VENOUS BLD VENIPUNCTURE: CPT

## 2023-11-06 PROCEDURE — 82550 ASSAY OF CK (CPK): CPT

## 2023-11-06 PROCEDURE — 83690 ASSAY OF LIPASE: CPT

## 2023-11-07 ENCOUNTER — TELEPHONE (OUTPATIENT)
Dept: ONCOLOGY | Facility: CLINIC | Age: 43
End: 2023-11-07
Payer: MEDICAID

## 2023-11-07 NOTE — ORAL ONC MGMT
Oral Chemotherapy Monitoring Program  Lab Follow Up    Reviewed lab results from 11/7.        7/28/2023     2:00 PM 8/2/2023    10:00 AM 8/16/2023     2:00 PM 8/25/2023    10:00 AM 9/26/2023     8:00 AM 10/27/2023     1:00 PM 11/7/2023     4:00 PM   ORAL CHEMOTHERAPY   Assessment Type Other Other Initial Follow up Refill Refill Refill;Chart Review;Other Lab Monitoring;Refill   Diagnosis Code Non-Small Cell Lung Cancer Non-Small Cell Lung Cancer Non-Small Cell Lung Cancer Non-Small Cell Lung Cancer Non-Small Cell Lung Cancer Non-Small Cell Lung Cancer Non-Small Cell Lung Cancer   Providers Dr Cori Persaud   [facility]   Is this patient followed by the [facility] OC team? No No No No      Drug Name Alunbrig (brigatinib) Alunbrig (brigatinib) Alunbrig (brigatinib) Alunbrig (brigatinib) Alunbrig (brigatinib) Alunbrig (brigatinib) Alunbrig (brigatinib)   Dose 120 mg 120 mg 120 mg 120 mg 120 mg 120 mg 120 mg   Current Schedule Daily Daily Daily Daily Daily Daily Daily   Cycle Details Continuous Continuous Continuous Continuous Continuous Continuous Continuous   Planned next cycle start date       11/7/2023   Doses missed in last 2 weeks   0       Adherence Assessment   Adherent       Any new drug interactions?      No    Is the dose as ordered appropriate for the patient?      Yes        Labs:  _  Result Component Current Result Ref Range   Sodium 143 (11/6/2023) 135 - 145 mmol/L     _  Result Component Current Result Ref Range   Potassium 4.0 (11/6/2023) 3.4 - 5.3 mmol/L     _  Result Component Current Result Ref Range   Calcium 9.0 (11/6/2023) 8.6 - 10.0 mg/dL     _  Result Component Current Result Ref Range   Magnesium 1.7 (10/27/2023) 1.7 - 2.3 mg/dL     No results found for Phos within last 30 days.     _  Result Component Current Result Ref Range   Albumin 4.5 (11/6/2023) 3.5 - 5.2 g/dL      _  Result Component Current Result Ref Range   Urea Nitrogen 14.2 (11/6/2023) 6.0 - 20.0 mg/dL     _  Result Component Current Result Ref Range   Creatinine 0.86 (11/6/2023) 0.67 - 1.17 mg/dL     _  Result Component Current Result Ref Range   AST 16 (11/6/2023) 0 - 45 U/L     _  Result Component Current Result Ref Range   ALT 14 (11/6/2023) 0 - 70 U/L     _  Result Component Current Result Ref Range   Bilirubin Total 0.3 (11/6/2023) <=1.2 mg/dL     _  Result Component Current Result Ref Range   WBC Count 11.3 (H) (11/6/2023) 4.0 - 11.0 10e3/uL     _  Result Component Current Result Ref Range   Hemoglobin 13.7 (11/6/2023) 13.3 - 17.7 g/dL     _  Result Component Current Result Ref Range   Platelet Count 205 (11/6/2023) 150 - 450 10e3/uL     No results found for ANC within last 30 days.     _  Result Component Current Result Ref Range   Absolute Neutrophils 6.8 (11/6/2023) 1.6 - 8.3 10e3/uL        Assessment & Plan:  No concerning abnormalities. Lipase has improved to patient's baseline/ <1.5x ULN. Confirmed with elizabeth Hooks to resume brigatinib at 120 mg daily dosing.    Discussed findings with patient, who expressed understanding of this plan. He will resume the medication today (11/7).    Follow-Up:  11/17 labs and visit with Stella Jerry, PharmD, [facility]  Oral Chemotherapy Monitoring Program  [facility]  [phone]

## 2023-11-08 ENCOUNTER — MYC REFILL (OUTPATIENT)
Dept: RADIATION ONCOLOGY | Facility: HOSPITAL | Age: 43
End: 2023-11-08
Payer: MEDICAID

## 2023-11-08 DIAGNOSIS — Z98.890 S/P CRANIOTOMY: ICD-10-CM

## 2023-11-08 DIAGNOSIS — C34.31 MALIGNANT NEOPLASM OF LOWER LOBE OF RIGHT LUNG (H): Primary | ICD-10-CM

## 2023-11-08 DIAGNOSIS — D49.6 BRAIN TUMOR (H): ICD-10-CM

## 2023-11-08 DIAGNOSIS — G89.3 CANCER ASSOCIATED PAIN: ICD-10-CM

## 2023-11-08 RX ORDER — METHADONE HYDROCHLORIDE 5 MG/1
TABLET ORAL
Qty: 45 TABLET | Refills: 0 | Status: SHIPPED | OUTPATIENT
Start: 2023-11-10 | End: 2023-12-15

## 2023-11-08 RX ORDER — OXYCODONE HYDROCHLORIDE 10 MG/1
10 TABLET ORAL
Qty: 90 TABLET | Refills: 0 | Status: SHIPPED | OUTPATIENT
Start: 2023-11-08 | End: 2023-11-19

## 2023-11-08 NOTE — TELEPHONE ENCOUNTER
Received sifonrt message from patient requesting refill of oxycodone.     Last refill: 10/26/23  Last office visit: 10/23/23  Scheduled for follow up planned for late December.     Will route request to MD for review.     Reviewed [place]  Report.

## 2023-11-08 NOTE — TELEPHONE ENCOUNTER
Received Qiwi Postt message from patient requesting refill of methadone.      Last refill: 10/13/23  Last office visit: 10/23/23  Scheduled for follow up planned for late December.      Will route request to MD for review.      Reviewed [place]  Report.

## 2023-11-10 ENCOUNTER — LAB (OUTPATIENT)
Dept: LAB | Facility: CLINIC | Age: 43
End: 2023-11-10
Payer: COMMERCIAL

## 2023-11-10 DIAGNOSIS — C34.90 NON-SMALL CELL LUNG CANCER (NSCLC) (H): ICD-10-CM

## 2023-11-10 DIAGNOSIS — C34.31 MALIGNANT NEOPLASM OF LOWER LOBE OF RIGHT LUNG (H): ICD-10-CM

## 2023-11-10 DIAGNOSIS — C79.31 MALIGNANT NEOPLASM METASTATIC TO BRAIN (H): ICD-10-CM

## 2023-11-10 DIAGNOSIS — Z79.899 ENCOUNTER FOR LONG-TERM (CURRENT) USE OF MEDICATIONS: ICD-10-CM

## 2023-11-10 LAB
ALBUMIN SERPL BCG-MCNC: 4.3 G/DL (ref 3.5–5.2)
ALP SERPL-CCNC: 69 U/L (ref 40–129)
ALT SERPL W P-5'-P-CCNC: 14 U/L (ref 0–70)
AMYLASE SERPL-CCNC: 59 U/L (ref 28–100)
ANION GAP SERPL CALCULATED.3IONS-SCNC: 9 MMOL/L (ref 7–15)
AST SERPL W P-5'-P-CCNC: 19 U/L (ref 0–45)
BASOPHILS # BLD AUTO: 0 10E3/UL (ref 0–0.2)
BASOPHILS NFR BLD AUTO: 1 %
BILIRUB SERPL-MCNC: 0.3 MG/DL
BUN SERPL-MCNC: 17.4 MG/DL (ref 6–20)
CALCIUM SERPL-MCNC: 8.9 MG/DL (ref 8.6–10)
CHLORIDE SERPL-SCNC: 106 MMOL/L (ref 98–107)
CK SERPL-CCNC: 100 U/L (ref 39–308)
CREAT SERPL-MCNC: 0.67 MG/DL (ref 0.67–1.17)
DEPRECATED HCO3 PLAS-SCNC: 28 MMOL/L (ref 22–29)
EGFRCR SERPLBLD CKD-EPI 2021: >90 ML/MIN/1.73M2
EOSINOPHIL # BLD AUTO: 0.4 10E3/UL (ref 0–0.7)
EOSINOPHIL NFR BLD AUTO: 6 %
ERYTHROCYTE [DISTWIDTH] IN BLOOD BY AUTOMATED COUNT: 13.2 % (ref 10–15)
GLUCOSE SERPL-MCNC: 177 MG/DL (ref 70–99)
HCT VFR BLD AUTO: 39.8 % (ref 40–53)
HGB BLD-MCNC: 13.4 G/DL (ref 13.3–17.7)
IMM GRANULOCYTES # BLD: 0 10E3/UL
IMM GRANULOCYTES NFR BLD: 0 %
LIPASE SERPL-CCNC: 45 U/L (ref 13–60)
LYMPHOCYTES # BLD AUTO: 1.8 10E3/UL (ref 0.8–5.3)
LYMPHOCYTES NFR BLD AUTO: 27 %
MCH RBC QN AUTO: 31.9 PG (ref 26.5–33)
MCHC RBC AUTO-ENTMCNC: 33.7 G/DL (ref 31.5–36.5)
MCV RBC AUTO: 95 FL (ref 78–100)
MONOCYTES # BLD AUTO: 0.6 10E3/UL (ref 0–1.3)
MONOCYTES NFR BLD AUTO: 9 %
NEUTROPHILS # BLD AUTO: 3.7 10E3/UL (ref 1.6–8.3)
NEUTROPHILS NFR BLD AUTO: 57 %
NRBC # BLD AUTO: 0 10E3/UL
NRBC BLD AUTO-RTO: 0 /100
PHOSPHATE SERPL-MCNC: 3.5 MG/DL (ref 2.5–4.5)
PLATELET # BLD AUTO: 171 10E3/UL (ref 150–450)
POTASSIUM SERPL-SCNC: 3.6 MMOL/L (ref 3.4–5.3)
PROT SERPL-MCNC: 6.3 G/DL (ref 6.4–8.3)
RBC # BLD AUTO: 4.2 10E6/UL (ref 4.4–5.9)
SODIUM SERPL-SCNC: 143 MMOL/L (ref 135–145)
WBC # BLD AUTO: 6.5 10E3/UL (ref 4–11)

## 2023-11-10 PROCEDURE — 84100 ASSAY OF PHOSPHORUS: CPT

## 2023-11-10 PROCEDURE — 36415 COLL VENOUS BLD VENIPUNCTURE: CPT

## 2023-11-10 PROCEDURE — 80053 COMPREHEN METABOLIC PANEL: CPT

## 2023-11-10 PROCEDURE — 85025 COMPLETE CBC W/AUTO DIFF WBC: CPT

## 2023-11-10 PROCEDURE — 83690 ASSAY OF LIPASE: CPT

## 2023-11-10 PROCEDURE — 82550 ASSAY OF CK (CPK): CPT

## 2023-11-10 PROCEDURE — 82150 ASSAY OF AMYLASE: CPT

## 2023-11-13 ENCOUNTER — MYC MEDICAL ADVICE (OUTPATIENT)
Dept: ONCOLOGY | Facility: CLINIC | Age: 43
End: 2023-11-13
Payer: MEDICAID

## 2023-11-13 NOTE — CONFIDENTIAL NOTE
"Oncology Nurse Triage - Reporting Symptoms    Situation:   Connor reporting the following symptoms: tongue pain       Background:   Treating Provider:   Dr Persaud     Date of last office visit: 10/27/23 Sarina Navarro     Recent treatments: Yes: 10/27/23 C5D1 Avastin.         Assessment  Onset of symptoms: last couple of nights will wake up from pain in tongue, lasts about 30 seconds.  It is the whole tongue that hurts. It is a sharp stabbing pain.   Has had reactions to chemo before so is wondering if it is a reaction to chemo.   No problems with breathing or swallowing when having tongue pain.   Takes oxycodone before bed and even that does not help with tongue pain.   Happens 1x a night  It is an unusual pain that he just has to wait out and then it is gone and does not happen again during the night.   No mouth sores.   Has \"fat\" tongue from chemo where tongue is a little bit swollen.                "

## 2023-11-17 ENCOUNTER — ONCOLOGY VISIT (OUTPATIENT)
Dept: ONCOLOGY | Facility: CLINIC | Age: 43
End: 2023-11-17
Attending: PHYSICIAN ASSISTANT
Payer: COMMERCIAL

## 2023-11-17 ENCOUNTER — APPOINTMENT (OUTPATIENT)
Dept: LAB | Facility: CLINIC | Age: 43
End: 2023-11-17
Attending: PHYSICIAN ASSISTANT
Payer: COMMERCIAL

## 2023-11-17 VITALS
DIASTOLIC BLOOD PRESSURE: 83 MMHG | TEMPERATURE: 98.3 F | WEIGHT: 216 LBS | HEART RATE: 60 BPM | OXYGEN SATURATION: 95 % | RESPIRATION RATE: 16 BRPM | BODY MASS INDEX: 31.9 KG/M2 | SYSTOLIC BLOOD PRESSURE: 139 MMHG

## 2023-11-17 DIAGNOSIS — R51.9 NONINTRACTABLE EPISODIC HEADACHE, UNSPECIFIED HEADACHE TYPE: ICD-10-CM

## 2023-11-17 DIAGNOSIS — I10 PRIMARY HYPERTENSION: ICD-10-CM

## 2023-11-17 DIAGNOSIS — N30.01 ACUTE CYSTITIS WITH HEMATURIA: ICD-10-CM
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
## 2023-11-17 DIAGNOSIS — C34.31 MALIGNANT NEOPLASM OF LOWER LOBE OF RIGHT LUNG (H): ICD-10-CM

## 2023-11-17 DIAGNOSIS — I67.89 RADIATION THERAPY INDUCED BRAIN NECROSIS: ICD-10-CM

## 2023-11-17 DIAGNOSIS — C34.31 MALIGNANT NEOPLASM OF LOWER LOBE OF RIGHT LUNG (H): Primary | ICD-10-CM

## 2023-11-17 DIAGNOSIS — N30.01 ACUTE CYSTITIS WITH HEMATURIA: Primary | ICD-10-CM

## 2023-11-17 LAB
ALBUMIN SERPL BCG-MCNC: 4.3 G/DL (ref 3.5–5.2)
ALBUMIN UR-MCNC: 50 MG/DL
ALBUMIN UR-MCNC: 50 MG/DL
ALP SERPL-CCNC: 75 U/L (ref 40–150)
ALT SERPL W P-5'-P-CCNC: 12 U/L (ref 0–70)
ANION GAP SERPL CALCULATED.3IONS-SCNC: 13 MMOL/L (ref 7–15)
APPEARANCE UR: CLEAR
AST SERPL W P-5'-P-CCNC: 34 U/L (ref 0–45)
BILIRUB SERPL-MCNC: 0.3 MG/DL
BILIRUB UR QL STRIP: NEGATIVE
BUN SERPL-MCNC: 16.9 MG/DL (ref 6–20)
CALCIUM SERPL-MCNC: 9.3 MG/DL (ref 8.6–10)
CHLORIDE SERPL-SCNC: 107 MMOL/L (ref 98–107)
COLOR UR AUTO: YELLOW
CREAT SERPL-MCNC: 0.75 MG/DL (ref 0.67–1.17)
DEPRECATED HCO3 PLAS-SCNC: 25 MMOL/L (ref 22–29)
EGFRCR SERPLBLD CKD-EPI 2021: >90 ML/MIN/1.73M2
GLUCOSE SERPL-MCNC: 86 MG/DL (ref 70–99)
GLUCOSE UR STRIP-MCNC: NEGATIVE MG/DL
HGB UR QL STRIP: NEGATIVE
HYALINE CASTS: 213 /LPF
KETONES UR STRIP-MCNC: ABNORMAL MG/DL
LEUKOCYTE ESTERASE UR QL STRIP: NEGATIVE
MAGNESIUM SERPL-MCNC: 2 MG/DL (ref 1.7–2.3)
MUCOUS THREADS #/AREA URNS LPF: PRESENT /LPF
NITRATE UR QL: NEGATIVE
PH UR STRIP: 5.5 [PH] (ref 5–7)
POTASSIUM SERPL-SCNC: 4.5 MMOL/L (ref 3.4–5.3)
POTASSIUM SERPL-SCNC: 4.5 MMOL/L (ref 3.4–5.3)
PROT SERPL-MCNC: 6.9 G/DL (ref 6.4–8.3)
RBC URINE: 0 /HPF
SODIUM SERPL-SCNC: 145 MMOL/L (ref 135–145)
SP GR UR STRIP: 1.03 (ref 1–1.03)
SQUAMOUS EPITHELIAL: 2 /HPF
UROBILINOGEN UR STRIP-MCNC: NORMAL MG/DL
WBC URINE: 4 /HPF

## 2023-11-17 PROCEDURE — G0463 HOSPITAL OUTPT CLINIC VISIT: HCPCS | Mod: 25 | Performed by: PHYSICIAN ASSISTANT

## 2023-11-17 PROCEDURE — 84132 ASSAY OF SERUM POTASSIUM: CPT | Performed by: PHYSICIAN ASSISTANT

## 2023-11-17 PROCEDURE — 81003 URINALYSIS AUTO W/O SCOPE: CPT | Performed by: PHYSICIAN ASSISTANT

## 2023-11-17 PROCEDURE — 83735 ASSAY OF MAGNESIUM: CPT | Performed by: PHYSICIAN ASSISTANT

## 2023-11-17 PROCEDURE — 96413 CHEMO IV INFUSION 1 HR: CPT

## 2023-11-17 PROCEDURE — 258N000003 HC RX IP 258 OP 636: Performed by: PHYSICIAN ASSISTANT

## 2023-11-17 PROCEDURE — 81001 URINALYSIS AUTO W/SCOPE: CPT

## 2023-11-17 PROCEDURE — 99214 OFFICE O/P EST MOD 30 MIN: CPT | Performed by: PHYSICIAN ASSISTANT

## 2023-11-17 PROCEDURE — 250N000011 HC RX IP 250 OP 636: Mod: JZ | Performed by: PHYSICIAN ASSISTANT

## 2023-11-17 PROCEDURE — 80053 COMPREHEN METABOLIC PANEL: CPT

## 2023-11-17 PROCEDURE — 36415 COLL VENOUS BLD VENIPUNCTURE: CPT | Performed by: PHYSICIAN ASSISTANT

## 2023-11-17 RX ORDER — DIPHENHYDRAMINE HYDROCHLORIDE 50 MG/ML
50 INJECTION INTRAMUSCULAR; INTRAVENOUS
Status: CANCELLED
Start: 2023-11-17

## 2023-11-17 RX ORDER — PROPRANOLOL HYDROCHLORIDE 20 MG/1
40 TABLET ORAL 3 TIMES DAILY
Qty: 180 TABLET | Refills: 1 | Status: SHIPPED | OUTPATIENT
Start: 2023-11-17 | End: 2024-03-29

## 2023-11-17 RX ORDER — HYDROCHLOROTHIAZIDE 50 MG/1
50 TABLET ORAL DAILY
Qty: 30 TABLET | Refills: 1 | Status: SHIPPED | OUTPATIENT
Start: 2023-11-17 | End: 2024-03-15

## 2023-11-17 RX ORDER — MEPERIDINE HYDROCHLORIDE 25 MG/ML
25 INJECTION INTRAMUSCULAR; INTRAVENOUS; SUBCUTANEOUS EVERY 30 MIN PRN
Status: CANCELLED | OUTPATIENT
Start: 2023-11-17

## 2023-11-17 RX ORDER — LORAZEPAM 2 MG/ML
0.5 INJECTION INTRAMUSCULAR EVERY 4 HOURS PRN
Status: CANCELLED | OUTPATIENT
Start: 2023-11-17

## 2023-11-17 RX ORDER — METHYLPREDNISOLONE SODIUM SUCCINATE 125 MG/2ML
125 INJECTION, POWDER, LYOPHILIZED, FOR SOLUTION INTRAMUSCULAR; INTRAVENOUS
Status: CANCELLED
Start: 2023-11-17

## 2023-11-17 RX ORDER — ALBUTEROL SULFATE 90 UG/1
1-2 AEROSOL, METERED RESPIRATORY (INHALATION)
Status: CANCELLED
Start: 2023-11-17

## 2023-11-17 RX ORDER — ALBUTEROL SULFATE 0.83 MG/ML
2.5 SOLUTION RESPIRATORY (INHALATION)
Status: CANCELLED | OUTPATIENT
Start: 2023-11-17

## 2023-11-17 RX ORDER — HEPARIN SODIUM,PORCINE 10 UNIT/ML
5-20 VIAL (ML) INTRAVENOUS DAILY PRN
Status: CANCELLED | OUTPATIENT
Start: 2023-11-17

## 2023-11-17 RX ORDER — HEPARIN SODIUM (PORCINE) LOCK FLUSH IV SOLN 100 UNIT/ML 100 UNIT/ML
5 SOLUTION INTRAVENOUS
Status: CANCELLED | OUTPATIENT
Start: 2023-11-17

## 2023-11-17 RX ORDER — EPINEPHRINE 1 MG/ML
0.3 INJECTION, SOLUTION INTRAMUSCULAR; SUBCUTANEOUS EVERY 5 MIN PRN
Status: CANCELLED | OUTPATIENT
Start: 2023-11-17

## 2023-11-17 RX ADMIN — BEVACIZUMAB 1400 MG: 400 INJECTION, SOLUTION INTRAVENOUS at 15:03

## 2023-11-17 RX ADMIN — SODIUM CHLORIDE 250 ML: 9 INJECTION, SOLUTION INTRAVENOUS at 15:03

## 2023-11-17 ASSESSMENT — PAIN SCALES - GENERAL: PAINLEVEL: NO PAIN (0)

## 2023-11-17 NOTE — Clinical Note
11/17/2023         RE: Connor Emerson  [address]  [facility] [address]        Dear Colleague,    Thank you for referring your patient, Connor Emerson, to the [facility]. Please see a copy of my visit note below.    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 He is currently undergoing treatment with brigatinib and bevacizumab. Please see previous note by Dr. Persaud for further details on the patient's history.     He comes in today for bevacizumab, C6    Interval History:  -Still having excessive sleep. Going to bed at 6pm and sleeping until 6 am.  -Completed 4 days of the antibiotic gave him for a UTI.  This was done last week, instead of when I gave it to him on October 30.  He did complains of some pain in his tongue, it is resolved now but he wonders if this is from the antibiotic  - started the cymbalta, this has helped with the muscle/joint pain. He could tell a difference that his pain was less from this.  We did increase this to twice daily however he has not seen any further improvement  -Connor feels he is doing ok today. His BP readings 140-150/.  Wife Kylie is with today who also confirms his readings have been much better in the last month.  He continues on propanolol 40mg BID, lisinopril 40 mg and hydrochlorothiazide to 50mg. No headaches.   -Had one brief episode of blood in urine 1 x with C3 and 1 x C4, we discussed repeating his UA today to see if the UTI is resolved.  -He follows with palliative for pain control.  Taking methadone 1 in the AM and 1 PM, oxycodone 2 at lunch and 2 bedtime.  He cut out the morning oxycodone to see if he would feel less tired.  He uses celebrex in the AM.   -No speech changes,  vision changes, paresthesias, focal weakness, leg swelling, or activity concerning for seizures.  -No chest pain or SOB.   -No cough or fever.  -Having regular BM;s, last this AM. No abdominal pain.      Physical Examination:  /83   Pulse 60   Temp 98.3  F (36.8  C) (Oral)   Resp 16   Wt 98 kg (216 lb)   SpO2 95%   BMI 31.90 kg/m    Wt Readings from Last 4 Encounters:   11/17/23 98 kg (216 lb)   10/27/23 98.3 kg (216 lb 11.2 oz)   10/23/23 97.5 kg (215 lb)   10/06/23 97.9 kg (215 lb 14.4 oz)         General: Well-appearing male in no acute distress.  Eyes: EOMI, PERRL. No scleral icterus or conjunctival injection.  ENT: Oral mucosa is moist without lesions or thrush.   Lymphatic: Neck is supple without cervical or supraclavicular lymphadenopathy.   Cardiovascular: RRR No murmurs. No peripheral edema.  Respiratory: CTA bilaterally. No wheezes or crackles.  Gastrointestinal: BS +. Abdomen rounded, soft, non-tender.   Neurologic: Cranial nerves II through XII are grossly intact.   Skin: Peeling to skin left hand without associated erythema. No rashes, petechiae, or bruising noted on exposed skin.   Psych: Alert. Pleasant. Comprehension intact.   Laboratory Data:  Most Recent 3 CBC's:  Recent Labs   Lab Test 11/10/23  1309 11/06/23  1256 10/27/23  1056   WBC 6.5 11.3* 8.7   HGB 13.4 13.7 13.9   MCV 95 94 90    205 228   ANEUTAUTO 3.7 6.8 4.7    Most Recent 3 BMP's:  Recent Labs   Lab Test 11/17/23  1325 11/10/23  1309 11/06/23  1256    143 143   POTASSIUM 4.5  4.5 3.6 4.0   CHLORIDE 107 106 103   CO2 25 28 29   BUN 16.9 17.4 14.2   CR 0.75 0.67 0.86   ANIONGAP 13 9 11   TORY 9.3 8.9 9.0   GLC 86 177* 143*   PROTTOTAL 6.9 6.3* 6.5   ALBUMIN 4.3 4.3 4.5    Most Recent 2 LFT's:  Recent Labs   Lab Test 11/17/23  1325 11/10/23  1309   AST 34 19   ALT 12 14   ALKPHOS 75 69   BILITOTAL 0.3 0.3    Most Recent TSH and T4:  Recent Labs   Lab Test 09/12/22  1642   TSH 2.56   Reviewed lipase, amylase, CK,  and magnesium. Lipase elevated slightly.   I reviewed the above labs today.    Imaging:  MR Brain w/o & w Contrast  Narrative: MRI BRAIN WITHOUT AND WITH CONTRAST  10/25/2023 1:10 PM     HISTORY:  Hx of brain mets and radiation necrosis; Headache;  Neurologic deficit, non-traumatic; Language deficit; Neurologic  deficit onset <= 24 hours; No known/automatically detected potential  contraindications to iodinated contrast; Malignant neoplasm of lower  lobe of right lung (H); Radiation therapy induced brain necrosis;  Radiation therapy induced brain necrosis     TECHNIQUE:  Multiplanar, multisequence MRI of the brain without and  with 15 mL Gadavist, including whole brain perfusion imaging.     COMPARISON: Multiple previous brain MRIs, most recent 7/27/2023.     FINDINGS: Again seen are postoperative changes of right frontal  craniotomy. A resection cavity in the right frontal lobe is again  noted. The cystic resection cavity appears smaller compared to the  prior exam. Surrounding the right frontal lobe resection cavity along  its posterior medial and inferior margins, there is T2 FLAIR  hyperintense, heterogeneously enhancing nodular soft tissue, abutting  the frontal horn of the right lateral ventricle. Compared to the most  recent study, this nodular enhancing tissue along the posterior margin  of the resection cavity appears mildly increased size/radial  thickness. The tissue now shows more discrete T2 FLAIR hyperintense  signal (series 4 image 16), new intrinsic T1 hyperintense signal, and  restricted diffusion (series 2 image 55). It measures up to  approximately 12-13 mm in radial thickness. There is no evidence for  increased cerebral blood volume in this location on the perfusion  imaging.    Again seen are changes of left anterior parietal craniotomy. Unchanged  irregular parenchymal enhancement in the left frontal lobe underlying  the resection cavity, favored to reflect posttreatment changes (series  14  image 132).    Multiple additional small scattered supratentorial and infratentorial  metastatic lesions are similar to slightly decreased in size overall.  Ring-enhancing lesion in the left cerebellar hemisphere with central  DWI hyperintense signal it measures 12 mm x 8 mm compared to 13 mm x  10 mm previously. A couple of adjacent left cerebellar enhancing  lesions are smaller/less conspicuous as well. Heterogeneously  enhancing nodular lesion in the inferolateral right temporal lobe is  decreased in size and conspicuity (series 14 image 52) measuring 10 mm  x 9 mm the axial plane compared to 14 mm x 9 mm previously. Enhancing  cortical/subcortical nodule in the left parietal lobe (series 14 image  109) measuring approximately 4-5 mm appears slightly increased in  conspicuity compared to most recent study, but relatively similar to  exam of 6/28/2023. Enhancing lesion in the right parietal lobe  measuring 2-3 mm (series 14 image 133) appears relatively similar to  exam of 6/28/2023, but mildly increased in conspicuity compared to  7/27/2023. A previously seen left frontal white matter metastasis  evident on MRI of 6/24/2023 is not well seen on the current study;  this is similar to the most recent study.    Vasogenic edema is seen in the right frontal lobe on the previous exam  has significantly decreased. The associated mass effect is decreased.  There is no longer any midline shift. There is mild residual vasogenic  edema/T2 FLAIR hyperintense signal in the right frontal lobe without  significant mass effect. No hydrocephalus. Multiple small scattered  foci of hemosiderin staining involving previously demonstrated  metastatic lesions and the margins of the right frontal resection  cavity appear unchanged. No acute intracranial hemorrhage or  extra-axial fluid collection. Mild regional dural thickening in the  region of the right sided craniotomy flap is likely reactive,  unchanged.    Orbits appear  unremarkable. The paranasal sinuses are free of  significant disease. The not operative calvarium, skull base, and  midface otherwise appear grossly unremarkable.  Impression: IMPRESSION:  1. Redemonstrated postoperative changes of right frontal craniotomy.  Decreased size of the right frontal resection cavity with significant  decrease in the surrounding right frontal lobe vasogenic edema seen on  the previous study. Mass effect has essentially resolved in the  interim and there is no longer any midline shift.  2. In the interim, slightly increased thickness of a rind of  peripheral nodular enhancement along the posterior inferior and medial  aspects of the right frontal lobe resection cavity, indeterminate.  There is no significant elevated cerebral blood volume in this area.  The findings may represent evolving posttreatment changes; however,  residual tumor is not excluded. Recommend close interval follow-up.  3. Stable postoperative changes status post left anterior parietal  craniotomy with irregular enhancement in the left frontal lobe  parenchyma underlying the resection cavity, likely due to  posttreatment change.  4. Other scattered supratentorial and infratentorial metastatic  lesions are overall similar to slightly decreased in size, as  described.  5. No acute intracranial process.    JM SHI MD         SYSTEM ID:  [id number]    I reviewed the above imaging today.    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   -lipase 283 in October, grade 3 elevation. No symptoms of abdominal pain, N/V.  We did hold this for 1 week and it returned to normal.  He resumed the same dose of 120 mg daily.  -recent imaging in September stable, next images in December  - urine protein elevated.  Will hold  off on 24 hour urine unless >100.  He only has today's dose left of the planned 6.    NEURO  # Brain mets: reviewed brain MRI today, stable  # Radiation necrosis  -Baseline Brain MRI with several brain mets, s/p GK to 11-12 brain mets. F/u Brain MRI in June 2022 was showing enlargement of the one of the lesions along with edema, therefore had to undergo craniotomy followed by resection, the final biopsy consistent with radiation necrosis.   -currently on bevacizumab for radiation necrosis --15 mg/kg every 3 weeks.    (Of note, Connor S/p 2 doses of Bevacixumab fall of 2022, stopped due HTN urgency and PE.) MRI in 4/2023 now showing contrast enhancement of lesions and a dominate lesion in the Rfrontal region with edema, several other smaller lesion. Short term MRI showing increase in size of the rt frontal lesion, underwent resection, patho again showing radiation necrosis.  Reviewed this is improved on October imaging  -BP has been more consistent in the 140/90 range.  Will ask him to continue to monitor 1-2 x daily at home.    - continue propanolol to 40 mg TID, 50 mg of hydrochlorothiazide and lisinopril 40 mg daily.  If needed could consider CCB or hydralazine.  Discussed with wife today that bevacizumab has a long half-life but eventually we may not need to continue with all of these antihypertensives.  -Check BP twice daily. Educated on risk of stroke due to uncontrolled BP and warning signs of this that warrant immediate care.   # Headache  -Present since recent craniotomy on 6/27/23. MRI brain on 7/27/23 with reduced vasogenic edema. Small dose of Dex + taper in August, these have resolved.  No further headaches      Elevated Lipase  -Normal range last week    Diabetes, Type 2  Reports improved control. Currently running about 150-170 at home  --on Metformin and insulin    PE: provoked by malignancy vs bevacizumab in 11/2022  -- on rivaroxiabn 20 mg daily  -- of note, Connor not taking this.  From notes I could  find he stopped (?unsure why) in April.  Given hypercoagulability of cancer and h/o of clotting on Avastin, I restarted this in October    Grade 2-3 arthralgias  All joints affected, no morning stiffness, normal ESR and CRP  Recently has noticed more fatigue and stifnnes in body.  2/2 to brigatinib and possible worsening with Avastin.   -following with palliative;   -on methadone BID;Celecoxib 100 mg po BID  -I switched him from Celexa to Cymbalta.  This initially helped his arthralgias.  We increased to twice daily.  Unfortunately he was unable to notice much difference.   -Continues on oxycodone 2-3 times daily    Hematuria: Repeat UA UC today    50 minutes spent on the date of the encounter doing chart review, review of test results, interpretation of tests, patient visit, and documentation  and discussion of plan with Dr Cori Navarro PA-C  [facility]  [address] [place] [id number] [phone]           Again, thank you for allowing me to participate in the care of your patient.        Sincerely,        Sarina Navarro PA-C

## 2023-11-17 NOTE — NURSING NOTE
Chief Complaint   Patient presents with    Blood Draw     Labs drawn with  by rn.   VS taken.     Labs drawn with  by rn.  Pt tolerated well.  VS taken.  Pt checked in for next appt.    Urine collected and sent as well.    Giuliana Silvestre RN

## 2023-11-17 NOTE — NURSING NOTE
"Oncology Rooming Note    November 17, 2023 1:53 PM   Connor Emerson is a 43 year old male who presents for:    Chief Complaint   Patient presents with    Blood Draw     Labs drawn with  by rn.   VS taken.    Oncology Clinic Visit     Malignant neoplasm of lower lobe of right lung     Initial Vitals: /83   Pulse 60   Temp 98.3  F (36.8  C) (Oral)   Resp 16   Wt 98 kg (216 lb)   SpO2 95%   BMI 31.90 kg/m   Estimated body mass index is 31.9 kg/m  as calculated from the following:    Height as of 10/23/23: 1.753 m (5' 9\").    Weight as of this encounter: 98 kg (216 lb). Body surface area is 2.18 meters squared.  No Pain (0) Comment: Data Unavailable   No LMP for male patient.  Allergies reviewed: Yes  Medications reviewed: Yes    Medications: MEDICATION REFILLS NEEDED TODAY. Provider was notified.  Pharmacy name entered into EPIC:    [facility] - [place], [place] - [address] [address] [facility] - [place], [place] - [address]   [facility] - [place], [place] - [address]    Clinical concerns: Refills on propranolol      Darcie Baron              "

## 2023-11-17 NOTE — PROGRESS NOTES
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 He is currently undergoing treatment with brigatinib and bevacizumab. Please see previous note by Dr. Persaud for further details on the patient's history.     He comes in today for bevacizumab, C6    Interval History:  -Still having excessive sleep. Going to bed at 6pm and sleeping until 6 am.  -Completed 4 days of the antibiotic gave him for a UTI.  This was done last week, instead of when I gave it to him on October 30.  He did complains of some pain in his tongue, it is resolved now but he wonders if this is from the antibiotic  - started the cymbalta, this has helped with the muscle/joint pain. He could tell a difference that his pain was less from this.  We did increase this to twice daily however he has not seen any further improvement  -Connor feels he is doing ok today. His BP readings 140-150/.  Wife Kylie is with today who also confirms his readings have been much better in the last month.  He continues on propanolol 40mg BID, lisinopril 40 mg and hydrochlorothiazide to 50mg. No headaches.   -Had one brief episode of blood in urine 1 x with C3 and 1 x C4, we discussed repeating his UA today to see if the UTI is resolved.  -He follows with palliative for pain control.  Taking methadone 1 in the AM and 1 PM, oxycodone 2 at lunch and 2 bedtime.  He cut out the morning oxycodone to see if he would feel less tired.  He uses celebrex in the AM.   -No speech changes, vision changes, paresthesias, focal weakness, leg swelling, or activity concerning for seizures.  -No chest pain or SOB.   -No cough or fever.  -Having regular BM;s, last this AM. No abdominal pain.      Physical Examination:  /83   Pulse 60   Temp 98.3   F (36.8  C) (Oral)   Resp 16   Wt 98 kg (216 lb)   SpO2 95%   BMI 31.90 kg/m    Wt Readings from Last 4 Encounters:   11/17/23 98 kg (216 lb)   10/27/23 98.3 kg (216 lb 11.2 oz)   10/23/23 97.5 kg (215 lb)   10/06/23 97.9 kg (215 lb 14.4 oz)         General: Well-appearing male in no acute distress.  Eyes: EOMI, PERRL. No scleral icterus or conjunctival injection.  ENT: Oral mucosa is moist without lesions or thrush.   Lymphatic: Neck is supple without cervical or supraclavicular lymphadenopathy.   Cardiovascular: RRR No murmurs. No peripheral edema.  Respiratory: CTA bilaterally. No wheezes or crackles.  Gastrointestinal: BS +. Abdomen rounded, soft, non-tender.   Neurologic: Cranial nerves II through XII are grossly intact.   Skin: Peeling to skin left hand without associated erythema. No rashes, petechiae, or bruising noted on exposed skin.   Psych: Alert. Pleasant. Comprehension intact.   Laboratory Data:  Most Recent 3 CBC's:  Recent Labs   Lab Test 11/10/23  1309 11/06/23  1256 10/27/23  1056   WBC 6.5 11.3* 8.7   HGB 13.4 13.7 13.9   MCV 95 94 90    205 228   ANEUTAUTO 3.7 6.8 4.7    Most Recent 3 BMP's:  Recent Labs   Lab Test 11/17/23  1325 11/10/23  1309 11/06/23  1256    143 143   POTASSIUM 4.5  4.5 3.6 4.0   CHLORIDE 107 106 103   CO2 25 28 29   BUN 16.9 17.4 14.2   CR 0.75 0.67 0.86   ANIONGAP 13 9 11   TORY 9.3 8.9 9.0   GLC 86 177* 143*   PROTTOTAL 6.9 6.3* 6.5   ALBUMIN 4.3 4.3 4.5    Most Recent 2 LFT's:  Recent Labs   Lab Test 11/17/23  1325 11/10/23  1309   AST 34 19   ALT 12 14   ALKPHOS 75 69   BILITOTAL 0.3 0.3    Most Recent TSH and T4:  Recent Labs   Lab Test 09/12/22  1642   TSH 2.56   Reviewed lipase, amylase, CK, and magnesium. Lipase elevated slightly.   I reviewed the above labs today.    Imaging:  MR Brain w/o & w Contrast  Narrative: MRI BRAIN WITHOUT AND WITH CONTRAST  10/25/2023 1:10 PM     HISTORY:  Hx of brain mets and radiation necrosis; Headache;  Neurologic  deficit, non-traumatic; Language deficit; Neurologic  deficit onset <= 24 hours; No known/automatically detected potential  contraindications to iodinated contrast; Malignant neoplasm of lower  lobe of right lung (H); Radiation therapy induced brain necrosis;  Radiation therapy induced brain necrosis     TECHNIQUE:  Multiplanar, multisequence MRI of the brain without and  with 15 mL Gadavist, including whole brain perfusion imaging.     COMPARISON: Multiple previous brain MRIs, most recent 7/27/2023.     FINDINGS: Again seen are postoperative changes of right frontal  craniotomy. A resection cavity in the right frontal lobe is again  noted. The cystic resection cavity appears smaller compared to the  prior exam. Surrounding the right frontal lobe resection cavity along  its posterior medial and inferior margins, there is T2 FLAIR  hyperintense, heterogeneously enhancing nodular soft tissue, abutting  the frontal horn of the right lateral ventricle. Compared to the most  recent study, this nodular enhancing tissue along the posterior margin  of the resection cavity appears mildly increased size/radial  thickness. The tissue now shows more discrete T2 FLAIR hyperintense  signal (series 4 image 16), new intrinsic T1 hyperintense signal, and  restricted diffusion (series 2 image 55). It measures up to  approximately 12-13 mm in radial thickness. There is no evidence for  increased cerebral blood volume in this location on the perfusion  imaging.    Again seen are changes of left anterior parietal craniotomy. Unchanged  irregular parenchymal enhancement in the left frontal lobe underlying  the resection cavity, favored to reflect posttreatment changes (series  14 image 132).    Multiple additional small scattered supratentorial and infratentorial  metastatic lesions are similar to slightly decreased in size overall.  Ring-enhancing lesion in the left cerebellar hemisphere with central  DWI hyperintense signal it measures  12 mm x 8 mm compared to 13 mm x  10 mm previously. A couple of adjacent left cerebellar enhancing  lesions are smaller/less conspicuous as well. Heterogeneously  enhancing nodular lesion in the inferolateral right temporal lobe is  decreased in size and conspicuity (series 14 image 52) measuring 10 mm  x 9 mm the axial plane compared to 14 mm x 9 mm previously. Enhancing  cortical/subcortical nodule in the left parietal lobe (series 14 image  109) measuring approximately 4-5 mm appears slightly increased in  conspicuity compared to most recent study, but relatively similar to  exam of 6/28/2023. Enhancing lesion in the right parietal lobe  measuring 2-3 mm (series 14 image 133) appears relatively similar to  exam of 6/28/2023, but mildly increased in conspicuity compared to  7/27/2023. A previously seen left frontal white matter metastasis  evident on MRI of 6/24/2023 is not well seen on the current study;  this is similar to the most recent study.    Vasogenic edema is seen in the right frontal lobe on the previous exam  has significantly decreased. The associated mass effect is decreased.  There is no longer any midline shift. There is mild residual vasogenic  edema/T2 FLAIR hyperintense signal in the right frontal lobe without  significant mass effect. No hydrocephalus. Multiple small scattered  foci of hemosiderin staining involving previously demonstrated  metastatic lesions and the margins of the right frontal resection  cavity appear unchanged. No acute intracranial hemorrhage or  extra-axial fluid collection. Mild regional dural thickening in the  region of the right sided craniotomy flap is likely reactive,  unchanged.    Orbits appear unremarkable. The paranasal sinuses are free of  significant disease. The not operative calvarium, skull base, and  midface otherwise appear grossly unremarkable.  Impression: IMPRESSION:  1. Redemonstrated postoperative changes of right frontal craniotomy.  Decreased size  of the right frontal resection cavity with significant  decrease in the surrounding right frontal lobe vasogenic edema seen on  the previous study. Mass effect has essentially resolved in the  interim and there is no longer any midline shift.  2. In the interim, slightly increased thickness of a rind of  peripheral nodular enhancement along the posterior inferior and medial  aspects of the right frontal lobe resection cavity, indeterminate.  There is no significant elevated cerebral blood volume in this area.  The findings may represent evolving posttreatment changes; however,  residual tumor is not excluded. Recommend close interval follow-up.  3. Stable postoperative changes status post left anterior parietal  craniotomy with irregular enhancement in the left frontal lobe  parenchyma underlying the resection cavity, likely due to  posttreatment change.  4. Other scattered supratentorial and infratentorial metastatic  lesions are overall similar to slightly decreased in size, as  described.  5. No acute intracranial process.    JM SHI MD         SYSTEM ID:  [id number]    I reviewed the above imaging today.    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   -lipase 283 in October, grade 3 elevation. No symptoms of abdominal pain, N/V.  We did hold this for 1 week and it returned to normal.  He resumed the same dose of 120 mg daily.  -recent imaging in September stable, next images in December  - urine protein elevated.  Will hold off on 24 hour urine unless >100.  He only has today's dose left of the planned 6.    NEURO  # Brain mets: reviewed brain MRI today, stable  # Radiation necrosis  -Baseline Brain MRI with several brain mets, s/p GK to 11-12 brain mets. F/u Brain MRI in June 2022 was showing  enlargement of the one of the lesions along with edema, therefore had to undergo craniotomy followed by resection, the final biopsy consistent with radiation necrosis.   -currently on bevacizumab for radiation necrosis --15 mg/kg every 3 weeks.    (Of note, Connor S/p 2 doses of Bevacixumab fall of 2022, stopped due HTN urgency and PE.) MRI in 4/2023 now showing contrast enhancement of lesions and a dominate lesion in the Rfrontal region with edema, several other smaller lesion. Short term MRI showing increase in size of the rt frontal lesion, underwent resection, patho again showing radiation necrosis.  Reviewed this is improved on October imaging  -BP has been more consistent in the 140/90 range.  Will ask him to continue to monitor 1-2 x daily at home.    - continue propanolol to 40 mg TID, 50 mg of hydrochlorothiazide and lisinopril 40 mg daily.  If needed could consider CCB or hydralazine.  Discussed with wife today that bevacizumab has a long half-life but eventually we may not need to continue with all of these antihypertensives.  -Check BP twice daily. Educated on risk of stroke due to uncontrolled BP and warning signs of this that warrant immediate care.   # Headache  -Present since recent craniotomy on 6/27/23. MRI brain on 7/27/23 with reduced vasogenic edema. Small dose of Dex + taper in August, these have resolved.  No further headaches      Elevated Lipase  -Normal range last week    Diabetes, Type 2  Reports improved control. Currently running about 150-170 at home  --on Metformin and insulin    PE: provoked by malignancy vs bevacizumab in 11/2022  -- on rivaroxiabn 20 mg daily  -- of note, Connor not taking this.  From notes I could find he stopped (?unsure why) in April.  Given hypercoagulability of cancer and h/o of clotting on Avastin, I restarted this in October    Grade 2-3 arthralgias  All joints affected, no morning stiffness, normal ESR and CRP  Recently has noticed more fatigue and stifnnes in  body.  2/2 to brigatinib and possible worsening with Avastin.   -following with palliative;   -on methadone BID;Celecoxib 100 mg po BID  -I switched him from Celexa to Cymbalta.  This initially helped his arthralgias.  We increased to twice daily.  Unfortunately he was unable to notice much difference.   -Continues on oxycodone 2-3 times daily    Hematuria: Repeat UA UC today    50 minutes spent on the date of the encounter doing chart review, review of test results, interpretation of tests, patient visit, and documentation  and discussion of plan with Dr Cori Navarro PA-C  [facility]  [address] [place] [id number] [phone]

## 2023-11-17 NOTE — PROGRESS NOTES
Infusion Nursing Note:  Connor Emerson presents today for Cycle 6, Day 1 Avastin.    Patient seen by provider today: Yes: ERIC Hooks   present during visit today: Not Applicable.    Note N/A.    Intravenous Access:  Peripheral IV placed.    Treatment Conditions:  Lab Results   Component Value Date     11/17/2023    POTASSIUM 4.5 11/17/2023    POTASSIUM 4.5 11/17/2023    MAG 2.0 11/17/2023    CR 0.75 11/17/2023    TORY 9.3 11/17/2023    BILITOTAL 0.3 11/17/2023    ALBUMIN 4.3 11/17/2023    ALT 12 11/17/2023    AST 34 11/17/2023     Results reviewed, labs MET treatment parameters, ok to proceed with treatment.  Urine Protein - 50 today. 24hr urine not needed per Stella Navarro note.     Post Infusion Assessment:  Patient tolerated infusion without incident.  Blood return noted pre and post infusion.  Site patent and intact, free from redness, edema or discomfort.  No evidence of extravasations.  Access discontinued per protocol.     Discharge Plan:   Prescription refills given for Propranolol.  AVS to patient via [employer].  Patient will return 12/6/23 for next appointment with MD.   Patient discharged in stable condition accompanied by: family.  Departure Mode: Ambulatory.      Sushila Olmos RN

## 2023-11-19 ENCOUNTER — MYC REFILL (OUTPATIENT)
Dept: RADIATION ONCOLOGY | Facility: HOSPITAL | Age: 43
End: 2023-11-19
Payer: MEDICAID

## 2023-11-19 DIAGNOSIS — D49.6 BRAIN TUMOR (H): ICD-10-CM

## 2023-11-19 DIAGNOSIS — Z98.890 S/P CRANIOTOMY: ICD-10-CM

## 2023-11-20 RX ORDER — OXYCODONE HYDROCHLORIDE 10 MG/1
10 TABLET ORAL
Qty: 90 TABLET | Refills: 0 | Status: SHIPPED | OUTPATIENT
Start: 2023-11-20 | End: 2023-11-27

## 2023-11-20 NOTE — TELEPHONE ENCOUNTER
Received [employer] message from patient requesting refill of oxycodone.     Last refill: 11/9/23  Last office visit: 10/23/23  Scheduled for follow up pending, repeat message sent to patient.     Will route request to MD for review.     Reviewed [place]  Report.

## 2023-11-25 ENCOUNTER — HEALTH MAINTENANCE LETTER (OUTPATIENT)
Age: 43
End: 2023-11-25

## 2023-11-27 DIAGNOSIS — D49.6 BRAIN TUMOR (H): ICD-10-CM

## 2023-11-27 DIAGNOSIS — Z98.890 S/P CRANIOTOMY: ICD-10-CM

## 2023-11-27 RX ORDER — OXYCODONE HYDROCHLORIDE 10 MG/1
10 TABLET ORAL
Qty: 90 TABLET | Refills: 0 | Status: SHIPPED | OUTPATIENT
Start: 2023-11-27 | End: 2023-12-06

## 2023-11-27 NOTE — TELEPHONE ENCOUNTER
Received telephone call from patient's wife requesting refill of oxycodone.     Last refill: 11/21/23 (#46 tabs, 6 day supply due to low pharmacy supply)  Last office visit: 10/23/23  Scheduled for follow up 12/19/23     Will route request to NP for review.     Reviewed [place]  Report.

## 2023-12-05 ENCOUNTER — HOSPITAL ENCOUNTER (OUTPATIENT)
Dept: PET IMAGING | Facility: CLINIC | Age: 43
Discharge: HOME | End: 2023-12-05
Attending: STUDENT IN AN ORGANIZED HEALTH CARE EDUCATION/TRAINING PROGRAM | Admitting: STUDENT IN AN ORGANIZED HEALTH CARE EDUCATION/TRAINING PROGRAM
Payer: COMMERCIAL

## 2023-12-05 DIAGNOSIS — C34.31 MALIGNANT NEOPLASM OF LOWER LOBE OF RIGHT LUNG (H): ICD-10-CM

## 2023-12-05 PROCEDURE — 74177 CT ABD & PELVIS W/CONTRAST: CPT

## 2023-12-05 PROCEDURE — A9552 F18 FDG: HCPCS | Performed by: STUDENT IN AN ORGANIZED HEALTH CARE EDUCATION/TRAINING PROGRAM

## 2023-12-05 PROCEDURE — 250N000011 HC RX IP 250 OP 636: Mod: JZ | Performed by: STUDENT IN AN ORGANIZED HEALTH CARE EDUCATION/TRAINING PROGRAM

## 2023-12-05 PROCEDURE — 78815 PET IMAGE W/CT SKULL-THIGH: CPT | Mod: PS

## 2023-12-05 PROCEDURE — 343N000001 HC RX 343: Performed by: STUDENT IN AN ORGANIZED HEALTH CARE EDUCATION/TRAINING PROGRAM

## 2023-12-05 RX ORDER — IOPAMIDOL 755 MG/ML
10-135 INJECTION, SOLUTION INTRAVASCULAR ONCE
Status: COMPLETED | OUTPATIENT
Start: 2023-12-05 | End: 2023-12-05

## 2023-12-05 RX ADMIN — IOPAMIDOL 119 ML: 755 INJECTION, SOLUTION INTRAVENOUS at 13:47

## 2023-12-05 RX ADMIN — FLUDEOXYGLUCOSE F-18 12.37 MILLICURIE: 500 INJECTION, SOLUTION INTRAVENOUS at 13:48

## 2023-12-05 NOTE — PROGRESS NOTES
Virtual Visit Details    Type of service:  Video Visit   Video Start Time:  2:56PM  Video End Time:3:20 PM    Originating Location (pt. Location): Home    Distant Location (provider location):  On-site  Platform used for Video Visit: [facility]        MEDICAL ONCOLOGY FOLLOW UP NOTE    PATIENT NAME: Connor Emerson  ENCOUNTER DATE: 12/6/2023    Care Team  Primary Oncologist: Bassam Persaud MD    REASON FOR CURRENT VISIT: F/u of lung cancer    HISTORY OF PRESENT ILLNESS:  MrBailee Emerson is a 42 year old  male who is a non-smoker with PMHx of T2DM, HTN with metastatic NSCLC comes for follow up     Oncologic Hx:    Diagnosis:   Stage IV NSCLC, Rt lung adenocarcinoma with metastasis to pleura, mediastinum , rt pleural effusion and brain diagnosed 1/2022 (AJCC 8th edition)  PD-L1 TPS 2-3% by [place]   NGS [facility] panel-EML4:ALK rearragement  NGS Guardant- GNAS R201H, KRAS K5E- No ALK    Treatment:   2/23/2022- current: Brigatinib. Dose reduced to 60 mg due to cough after two days of 90 mg daily -->back on 90 mg---> increased to 180 mg  ---> settled on 120 mg    6/27/23- Right stealth craniotomy and resection of tumor:     7/20/23- now- Bevacizumab for radiation necrosis        )  Past:  2/15/22- GK to 11 brain lesions  3/2/22- 12/2022 Alectinib 300 mg BID (Dose reduced to 450 mg BID from 600 mg BID due to grade 3 myalgias 3/21/22, again reduced to 300 mg BID 9/28/22)  6/28/22- Craniotomy, resection  9/28/22-10/26- Bevacizumab for radiation necrosis (stopped due to PE)      Intent of treatment: Palliative    Oncologic course:  1/19/22 to 1/22/22-Admitted to [facility] for 2 week progressive SOB secondary to have large rt sided pleural effusion, needing thoracentesis x2 (1.7L and 2.0 L removed), cytology positive for malignancy, adenocarcinoma.   1/26/22- Rt pleural mass biopsy-Dr. Agrawal--POSITIVE FOR ADENOCARCINOMA CONSISTENT WITH LUNG PRIMARY, admixed with mesothelial hyperplasia and inflammatory infiltrate (+ TTF-1 and CK 7;   negative  p40, calretinin and WT-1. PAX8 immunostain focal +). 4th thoracentesis done simultaneously - 3L approx removed.   2/1/22- PET/CT-Right lower lobe central infiltrative FDG avid 8.2 x 9.6 cm mass representing a primary lung adenocarcinoma. Ipsilateral right perihilar, bilateral pretracheal, subcarinal and superior mediastinal michele metastases. Contralateral mildly FDG avid few lung nodules are suspicious for contralateral metastasis. At least 3 intracranial metastases in the right frontal lobe, left frontal lobe and left cerebellar hemisphere. Nonspecific mild diffuse bone marrow uptake. Further evaluation with a spine MRI could be considered to rule out early marrow infiltration. This could also be seen with red marrow conversion.  2/5/22-  Brain MRI- At least 9 intracranial metastases as detailed above. The dominant lesions involving the orbital right frontal lobe, the posterior left middle frontal gyrus, anterior right temporal lobe and in the left cerebellar hemisphere have surrounding moderate vasogenic type edema.  2/15/22- Saw Dr. Arango from Rad Onc- Rcd GK to 12 lesion in bran  2/16/22- Pleurex placement   3/2/22- Started Alectinib 600 mg BID  3/21/22- Dose reduced to 450 mg BID due to grade 3 myalgias and fatigue  4/2/22 to 4/5/22- Admitted at [facility] for- Severe sepsis due to MSSA infection of right PleurX catheter s/p removal- He presented with onset of pain at tube site starting 4/1; at arrival was tachycardic with leukocytosis (22.7) and elevated lactic acid (2.9).  CT chest showed fluid and stranding tracking outside the pleural space into chest wall along pleural catheter.  IR was consulted and removed catheter 4/2 with report of pustular drainage and tip culture growing MSSA.  Thoracic Surg was consulted who felt no surgical indication necessary given minimal pleural fluid and lack of any signs of abscess.  Initially treated with broad spectrum coverage for sepsis, narrowed to Ancef once  sensitivities returned with plan to transition to cefadroxil for an additional 10 days at discharge per ID. Held drug 4/2 to 4/11 5/2/22- CT CAP- Overall, positive response to therapy with decreased size of right lower lobe and right pleural-based masses, pulmonary metastases, hilar and mediastinal lymphadenopathy. However, a single right posterior pleural-based mass has slightly increased in size since 2/24/2022. No metastatic disease in the abdomen and pelvis. Right Pleurx catheter has been removed. Trace right pleural effusion and right basilar atelectasis.  5/2/22- Brain MRI- The previously demonstrated brain metastases are mildly diminished in size versus to 2/5/2022. The degree of edema is also diminished but not completely resolved. Probable trace amounts of intralesional bleeding demonstrated on the gradient sequence within the metastases. No definite new metastasis or progressive mass effect. No hydrocephalus or infarct.    6/15/22 to 6/17/22- Admitted at [facility]-with aphasia and word finding difficulty over last few weeks.  He presented to [facility] ED on 6/10 for evaluation of his symptoms. MRI brain showed multiple intracranial metastases, with interval enlargement of the dominant lesion within the left frontal lobe and increased surrounding vasogenic edema with 2 mm rightward shift of the septum pellucidum. Due to his worsening anxiety, he left AMA. His symptoms continued to progress to where he could not write at work so he decided to go to the ED for re-evaluation and treatment. Evaluated by NSGY, Rad Onc (radiaiton necrosis vs tumor progression).  6/16/22- MR Brain (6/16) shows multiple intracranial metastases, with interval enlargement  of the dominant lesion within the left frontal lobe and increased surrounding vasogenic edema with 2 mm rightward shift of the septum pellucidum.  6/16/22- - CT CAP shows slightly decreased size of right lower lobe and right pleural-based masses. No new pulmonary nodules  or lymphadenopathy; No evidence of metastatic disease in the abdomen or pelvis.   6/28/22 to 6/30/22- Admitted at [facility]- Elective left Stealth craniotomy with resection of brain tumor due to ongoing symptoms. No intraoperative complications. EBL 50 ml.  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  7/5/22- Ct CAP- Right lower lobe low-density nodules are not significantly changed. A small left upper lobe pulmonary nodule is also unchanged. Trace pleural fluid on the right has increased slightly. No convincing evidence for metastatic disease in the abdomen or pelvis.  7/18/22 to 7/19/22- Admitted to [facility] for seizure- Reportedly was only taking once Keppra instead of twice daily. Also resumed on dexamethasone 2 mg daily  8/1/22- Brain MRI- Redemonstrated postsurgical changes status post left frontoparietal Craniotomy. Interval increase in size of the dominant ring-enhancing lesion in the left posterior superior frontal lobe with increased moderate surrounding vasogenic edema and local mass effect resulting in narrowing of the supratentorial ventricular system. No significant midline shift/herniation at this time    8/1/22- Dex was increased to 4 mg daily by Dr. Moran    9/1/22- CT Chest- Near resolution of previously seen right pleural nodule. Stable right lower lobe pulmonary nodule    9/28/22- Bevacizumab for radiation necrosis    10/26/22- Bevacizumab     10/26/22- Ct CAP- Stable posterior medial right lower lobe 1.9 x 1.1 cm nodule series 8 image 176. Adjacent stable scarring and atelectasis. The previously noted pleural nodule posteriorly on the right is not currently clearly identified. Stable left upper lobe 0.3 cm nodule image 56    10/26/22- Brain MRI- Overall improved appearance of multiple intracranial metastases with near resolution of associated edema and diminished enhancement and size of multiple residual lesions. No definite new or progressive metastasis.    11/7/22 to 11/9/22- Admitted for PE  and HTN urgency- Small pulmonary embolism in the right lower lobe pulmonary artery. started on Lovenox, Brain MRI neg for PRES.    12/29/22- ED visit- bilateral hip pain, pain in shoulders, knuckles, knees, and ankles- holding alectinib since 12/20/22 1/6/23- Ct CAP- Mild groundglass nodularity in the left upper lobe is new since the previous exam, and may be infectious in etiology. No other significant interval change. Pulmonary nodules are not significantly changed.    1/6/23- Brain MRI- Stable to diminished sequelae of intracranial metastasis and treatment changes. No new or progressive metastasis. No superimposed acute intracranial finding.     2/23/2022- Start Brigatinib. Dose reduced to 60 mg due to cough after two days of 90 mg daily -  3/23/23-Brigatinib  (increase to 90 mg)    4/20/23- CT CAP- Stable- Previously noted mild groundglass nodularity in the left upper lobe has resolved.Pulmonary nodules are unchanged.Trace amount pleural fluid on the right has decreased slightly.    4/20/23- Brain MRI- Possile progression- Largest metastasis within the right frontal lobe has increased in size with worsening peripheral nodular enhancement and worsening vasogenic edema contributing to new right to left midline shift.  Multiple new/enlarging metastases scattered throughout the cerebral hemispheres and cerebellum. Started on dexamethasone by NGS on 4/28/23 5/17/23- presents to clinic with glucose 627, admission to hospital for stability on insulin drip    6/16/23- Brain MRI- Interval increase in size of the necrotic lesion in the anterior aspect of the right frontal lobe from 2.4 x 2.3 x 2.4 cm previously to 3.0 x 3.5 x 2.8 cm on the current study. Mass effect due to slightly increased vasogenic edema surrounding the right frontal lesion resulted in increase of leftward midline shift of the anterior interhemispheric fissure from 0.7 cm previously to 0.9 cm currently. Interval increase in size in two adjacent  metastases at the frontoparietal junction on the left. The remaining scattered intra-axial enhancing nodules are not changed appreciably in size or appearance since the comparison study.No evidence for acute intracranial pathology.   Dr. Moran- Due to worsening headaches and more problems with walking. Recommended a right Stealth craniotomy for resection of the lesion.     6/27/23- Right stealth craniotomy and resection of tumor: Final path: radiation necrosis    7/10/23- Ct CAP- stable Stable exam without evidence for new disease.Stable pulmonary nodules including a dominant nodular opacity at the right lower lobe.    7/20/23- Bevacizumab for radiation necrosis    8/16/23- Bevacizumab     9/12/23- Ct CAP-  Stable right lower lobe dominant nodular opacity. No new disease in the chest, abdomen and pelvis.     9/15, 10/6, 10/27, 11/17-  Bevacizumab    10/25/23- MRI Brain- 1. Redemonstrated postoperative changes of right frontal craniotomy. Decreased size of the right frontal resection cavity with significant decrease in the surrounding right frontal lobe vasogenic edema seen on the previous study. Mass effect has essentially resolved in the interim and there is no longer any midline shift. In the interim, slightly increased thickness of a rind of peripheral nodular enhancement along the posterior inferior and medial aspects of the right frontal lobe resection cavity, indeterminate. There is no significant elevated cerebral blood volume in this area. The findings may represent evolving posttreatment changes; however, residual tumor is not excluded.  Stable postoperative changes status post left anterior parietal craniotomy with irregular enhancement in the left frontal lobe parenchyma underlying the resection cavity, likely due to posttreatment change. Other scattered supratentorial and infratentorial metastatic lesions are overall similar to slightly decreased in size, as described.    12/5/23- PET/CT- with sole site  of disease in the RLL measuring 1.6 x 1.4 cm and with SUV max of 4.2. No other sites of disease.     He works as a maintenance manger for apartment complexes    Interval Hx:    Presents on video visit to discuss PET/CT results, which showed minimal disease burden and only area of question in the RLL, consistent with prior CT scans.    He is generally doing reasonably well. Continues to have consistent myalgias, now somewhat improved and manageable with analgesics (managed by Dr. Brewer and palliative care). These myalgias are substantially worse during the week following his bevacizumab infusions, and gradually improve to about baseline within a week or so. Still manageable but much tougher during this period. He denies any additional side effects associated with the bevacizumab infusions. No other new symptoms or concerns. Denies any neurological issues.    He is taking his apixaban and denies any bleeding issues.    We reviewed his imaging and next steps.        ECOG PS 0-1    REVIEW OF SYSTEMS: 14 point ROS negative other than the symptoms noted above in the HPI.    Wt Readings from Last 4 Encounters:   11/17/23 98 kg (216 lb)   10/27/23 98.3 kg (216 lb 11.2 oz)   10/23/23 97.5 kg (215 lb)   10/06/23 97.9 kg (215 lb 14.4 oz)      Review of Systems:  A comprehensive ROS was performed and found to be negative or non-contributory with the exception of that noted in the HPI above.    Past Medical History:  GERD  Hypertension, not on medication  Type 2 diabetes mellitus, not on medications currently, previously on Metformin    Past Surgical History:  Past Surgical History:   Procedure Laterality Date    BRONCHOSCOPY RIGID OR FLEXIBLE W/TRANSENDOSCOPIC ENDOBRONCHIAL ULTRASOUND GUIDED Bilateral 1/26/2022    Procedure: Right BRONCHOSCOPY, FIBEROPTIC, endobronchial ultrasound, pleural biopsy;  Surgeon: Dallin Agrawal MD;  Location: [facility] OR    INJECT BLOCK MEDIAL BRANCH CERVICAL/THORACIC/LUMBAR      INSERT CHEST TUBE Right  2022    Procedure: INSERTION, CATHETER, INTERCOSTAL, FOR DRAINAGE;  Surgeon: Dallin Agrawal MD;  Location: [facility] GI    INSERT CHEST TUBE Right 3/9/2022    Procedure: INSERTION, CATHETER, INTERCOSTAL, FOR DRAINAGE;  Surgeon: Sushila Antonio MD;  Location:  GI    IR CHEST TUBE REMOVAL TUNNELED RIGHT  2022    OPTICAL TRACKING SYSTEM CRANIOTOMY, EXCISE TUMOR, COMBINED Left 2022    Procedure: Left stealth craniotomy for tumor resection with motor mapping;  Surgeon: Stephen Moran MD;  Location:  OR    OPTICAL TRACKING SYSTEM CRANIOTOMY, EXCISE TUMOR, COMBINED Right 2023    Procedure: Right stealth craniotomy and resection of tumor;  Surgeon: Stephen Moran MD;  Location:  OR    ORTHOPEDIC SURGERY      Ganesh. Rotator cuff repair.    PLEUROSCOPY N/A 2022    Procedure: Pleuroscopy with Pleural Biopsy;  Surgeon: Dallin Agrawal MD;  Location:  OR       Social History:  Lives with wife and 4 kids in [place]. Works as a  for an [employer] complex in [place]. Exposure to household chemicals and . No significant exposure to asbestos. No signal exposure to benzene or similar chemicals. No significant smoking history-states that he smoked 1 to 2 cigarettes occasionally per month for about 2 years in college, non-smoking since then. No significant alcohol use history. No other recreational substances. Good support system. Kids are 23, 19, 17 and 13.    Family History  Significant history for cancers on maternal side. Mother  of uterine cancer. 2 maternal uncles have possible metastatic melanoma.    Outpatient Medications:  Current Outpatient Medications   Medication    acetaminophen (TYLENOL) 325 MG tablet    albuterol (PROAIR HFA/PROVENTIL HFA/VENTOLIN HFA) 108 (90 Base) MCG/ACT inhaler    Alcohol Swabs PADS    baclofen (LIORESAL) 10 MG tablet    blood glucose (NO BRAND SPECIFIED) lancets standard    blood glucose (NO BRAND SPECIFIED) test strip     blood glucose monitoring (SOFTCLIX) lancets    Blood Glucose Monitoring Suppl (ACCU-CHEK GUIDE) w/Device KIT    brigatinib (ALUNBRIG) 30 MG TABS tablet    Continuous Blood Gluc Sensor (FREESTYLE IRENE 2 SENSOR) MISC    DULoxetine (CYMBALTA) 30 MG capsule    hydrochlorothiazide (HYDRODIURIL) 50 MG tablet    insulin aspart (NOVOLOG PEN) 100 UNIT/ML pen    insulin glargine (LANTUS PEN) 100 UNIT/ML pen    insulin pen needle (32G X 4 MM) 32G X 4 MM miscellaneous    levETIRAcetam (KEPPRA) 1000 MG tablet    lidocaine-prilocaine (EMLA) 2.5-2.5 % external cream    lisinopril (ZESTRIL) 40 MG tablet    methadone (DOLOPHINE) 5 MG tablet    naloxone (NARCAN) 4 MG/0.1ML nasal spray    oxyCODONE IR (ROXICODONE) 10 MG tablet    propranolol (INDERAL) 20 MG tablet    rivaroxaban ANTICOAGULANT (XARELTO) 20 MG TABS tablet    [employer] Container MISC     No current facility-administered medications for this visit.     PHYSICAL EXAMINATION ATTAINABLE DURING VIDEO VISIT:  CONSTITUTIONAL - Pt looks like stated age, pleasant, not in acute distress. Not obese.  NEURO: Oriented to time, person, and places. No tremor.  ENT, MOUTH: Pupils are equal.  Sclerae are anicteric.  Moist oral mucosa. No oral thrush.   NECK:  No jugular venous distention.  No thyroid enlargement.   RESPIRATORY: talk nl, no sob during conversation, no cough.   MUSCULOSKELETAL/EXTREMITIES:  No edema.  No joint deformity. Normal range of motion  SKIN:  No petechiae.  No rash.  No signs of cellulitis.   PSYCHIATRIC: Normal mood and affect. Good memory. Proper insight and judgement.       Labs & Studies: I personally reviewed the following studies:  Most Recent 3 CBC's:  Recent Labs   Lab Test 11/10/23  1309 11/06/23  1256 10/27/23  1056   WBC 6.5 11.3* 8.7   HGB 13.4 13.7 13.9   MCV 95 94 90    205 228     Most Recent 3 BMP's:  Recent Labs   Lab Test 11/17/23  1325 11/10/23  1309 11/06/23  1256    143 143   POTASSIUM 4.5  4.5 3.6 4.0   CHLORIDE 107 106 103   CO2  25 28 29   BUN 16.9 17.4 14.2   CR 0.75 0.67 0.86   ANIONGAP 13 9 11   TORY 9.3 8.9 9.0   GLC 86 177* 143*    Most Recent 2 LFT's:  Recent Labs   Lab Test 11/17/23  1325 11/10/23  1309   AST 34 19   ALT 12 14   ALKPHOS 75 69   BILITOTAL 0.3 0.3    Most Recent TSH and T4:  Recent Labs   Lab Test 09/12/22  1642   TSH 2.56        ASSESSMENT AND PLAN:  Stage IV NSCLC, Rt lung adenocarcinoma with metastasis to pleura, mediastinum , rt pleural effusion and brain diagnosed 1/2022 (AJCC 8th edition)  PD-L1 TPS 2-3% by [place]   NGS [facility] panel-EML4:ALK rearragement; chr2:[id number], chr2:[id number]  NGS Guardant- GNAS R201H, KRAS K5E- No ALK    He began Alectinib 600 mg BID 3/2/22 and unfortunately developed grade 3 myalgias which have improved with lowering the dose 450 mg BID. He was holding drug 4/2/22 to 4/11/22 due to MSSA infection from pleurex which is removed. Resumed at 450 mg BID. Due to ongoing myalgias (Although CK is normal), we dose reduced to 300 mg BID.   In Dec 2022, developed Gr 3 arthralgia, and we stopped drug 12/20/22. Had unremitting arthalgias, eventully had to starte PO steroids which led to improvement and resolved. Radiographicaly, he has had a near CR to Rx in the lung, has a residual rt LL lesion.     Began brigatinib 2/22/23 (delayed due to pt hesitancy). Developed severe cough on day 2 so brigatinib was held and cough resolved with in 24-48 hours. He restarted 60 mg daily and upped it to 90 mg.Restging CT showing stable disease in the lung, however, MRI with several contrast enhancement and increase in size of previously treated lesions. His dose was increased to 180 mg daily to address possible CNS disease progression and started on dexamethasone. 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 CT in [place] 20123 showing stbale disease.   Following surgery, we reduced to 120 mg/day due to worsenign joint aches/stiffness. Restaging CT in 9/2023  showing overall disease stablity with no evidence of active cancer. We opted to continue Brigatinib at 120 mg and restage in 3 months. PET/CT after these three months showed oligoperisstent disease with a single focus of active malignancy in the RLL. His brain imaging has shown improved vasogenic edema. We will consider resection (vs. SBRT) to this oligometastatic site.    Plan  - Continue brigatinib at 120 mg  - Will discuss case at tumor conference with consideration for resection  - RTC with CT in 3 months      NEURO  # Brain mets:   # Radiation necrosis, improving  -Baseline Brain MRI with several brain mets, s/p GK to 11-12 brain mets. F/u Brain MRI in June 2022 was showing enlargement of the one of the lesions along with edema, therefore had to undergo craniotomy followed by resection, the final biopsy consistent with radiation necrosis.   -Currently on bevacizumab for radiation necrosis --15 mg/kg every 3 weeks S/P 6 doses  (Of note, Connor S/p 2 doses of Bevacixumab fall of 2022, stopped due HTN urgency and PE.) MRI in 4/2023 now showing contrast enhancement of lesions and a dominate lesion in the R frontal region with edema, several other smaller lesion. Short term MRI showing increase in size of the rt frontal lesion, underwent resection, patho again showing radiation necrosis. Restarted bevacizumab, which resulted in improved radiation necrosis / vasogenic edema on imaging in 10/2023. BP has been reasonably controlled. We will plan to continue bevacizumab until at least his next imaging, with consideration to stop at that time if imaging shows ongoing improvement.   - Continue bevacizumab until next imaging (end of January/Feb 2024)   - Continue to monitor BP closely, now with improved control on antihypertensives    # Headache  -Present since recent craniotomy on 6/27/23. MRI brain on 7/27/23 with reduced vasogenic edema. Small dose of Dex + taper in August, these have resolved.  No further headaches         Elevated Lipase  -Normal range most recently     Diabetes, Type 2  Reports improved control. Currently running about 150-170 at home  --on Metformin and insulin     PE: provoked by malignancy vs bevacizumab in 11/2022  -- on rivaroxiabn 20 mg daily  -- of note, Connor not taking this.  From notes I could find he stopped (?unsure why) in April.  Given hypercoagulability of cancer and h/o of clotting on Avastin, I restarted this in October     Grade 2-3 arthralgias  All joints affected, no morning stiffness, normal ESR and CRP  Recently has noticed more fatigue and stifnnes in body.  2/2 to brigatinib and possible worsening with Avastin.   -following with palliative;   -on methadone BID;Celecoxib 100 mg po BID  -transitioned to Cymbalta.  This initially helped his arthralgias.  We increased to twice daily.  Unfortunately he was unable to notice much difference.   -Continues on oxycodone 2-3 times daily     Hematuria: resolved on repeat [facility]      #normocytic anemia: sudden drop to <7, requiring 1 unit blood transfusion. Lab work up unrevealing. Has recovered to baseline  -consider EGD if hgb drops again, risk for GI bleed with chronic steroid use           #right chest/rib pain---> intermittent- improved  Reproducible and intermittent, worse with movement of torso. Unsure of etiology but we agreed to monitor given mild, possibly msk, and no correlating respiratory or cardiac symptoms.          #MSSA infection, Sepsis at pleurex site- resolved  # Pleural effusion: s/p pleurex palcement  2/16/22. Then on 4/2 right PleurX catheter s/p removal for severe sepsis due to MSSA infection.     The patient was seen by and discussed with Dr. Persaud.    Lance Hess MD PhD  Hematology/Oncology Fellow  Pgr: [phone]    The pt was evaluated with resident/fellow and the note was edited to relflect the combined assessment and plan    On the day of service  Chart review: 5 minutes  Visit duration: 30 minutes  Care  coordination: 5 minutes    Bassam Persaud MD    Hematology, Oncology and Transplantation

## 2023-12-06 ENCOUNTER — MYC REFILL (OUTPATIENT)
Dept: PALLIATIVE CARE | Facility: CLINIC | Age: 43
End: 2023-12-06
Payer: MEDICAID

## 2023-12-06 ENCOUNTER — VIRTUAL VISIT (OUTPATIENT)
Dept: ONCOLOGY | Facility: CLINIC | Age: 43
End: 2023-12-06
Attending: STUDENT IN AN ORGANIZED HEALTH CARE EDUCATION/TRAINING PROGRAM
Payer: COMMERCIAL

## 2023-12-06 VITALS — WEIGHT: 215 LBS | BODY MASS INDEX: 31.84 KG/M2 | HEIGHT: 69 IN

## 2023-12-06 DIAGNOSIS — D49.6 BRAIN TUMOR (H): ICD-10-CM

## 2023-12-06 DIAGNOSIS — I67.89 RADIATION THERAPY INDUCED BRAIN NECROSIS: ICD-10-CM
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
## 2023-12-06 DIAGNOSIS — Z98.890 S/P CRANIOTOMY: ICD-10-CM

## 2023-12-06 DIAGNOSIS — C34.31 MALIGNANT NEOPLASM OF LOWER LOBE OF RIGHT LUNG (H): ICD-10-CM

## 2023-12-06 PROCEDURE — 99215 OFFICE O/P EST HI 40 MIN: CPT | Mod: 95 | Performed by: STUDENT IN AN ORGANIZED HEALTH CARE EDUCATION/TRAINING PROGRAM

## 2023-12-06 RX ORDER — OXYCODONE HYDROCHLORIDE 10 MG/1
10 TABLET ORAL
Qty: 90 TABLET | Refills: 0 | Status: SHIPPED | OUTPATIENT
Start: 2023-12-08 | End: 2023-12-15

## 2023-12-06 ASSESSMENT — PAIN SCALES - GENERAL: PAINLEVEL: NO PAIN (0)

## 2023-12-06 NOTE — TELEPHONE ENCOUNTER
Received [facility] message from patient requesting refill of oxycodone.     Last refill: 11/27/23  Last office visit: 10/23/23  Scheduled for follow up 12/19/23     Will route request to MD for review.     Reviewed [place]  Report.

## 2023-12-06 NOTE — NURSING NOTE
Is the patient currently in the state of [place]? YES    Visit mode:VIDEO    If the visit is dropped, the patient can be reconnected by: VIDEO VISIT: Text to cell phone:   Telephone Information:   Mobile [phone]       Will anyone else be joining the visit? NO  (If patient encounters technical issues they should call [phone]    How would you like to obtain your AVS? MyChart    Are changes needed to the allergy or medication list? No    Reason for visit: [facility]

## 2023-12-06 NOTE — LETTER
12/6/2023         RE: Connor Emerson  [address]  [facility] [address]        Dear Colleague,    Thank you for referring your patient, Connor Emerson, to the [facility]. Please see a copy of my visit note below.    Virtual Visit Details    Type of service:  Video Visit   Video Start Time:  2:56PM  Video End Time:3:20 PM    Originating Location (pt. Location): Home    Distant Location (provider location):  On-site  Platform used for Video Visit: [facility] ONCOLOGY FOLLOW UP NOTE    PATIENT NAME: Connor Emerson  ENCOUNTER DATE: 12/6/2023    Care Team  Primary Oncologist: Bassam Persaud MD    REASON FOR CURRENT VISIT: F/u of lung cancer    HISTORY OF PRESENT ILLNESS:  Mr. Connor Emerson is a 42 year old  male who is a non-smoker with PMHx of T2DM, HTN with metastatic NSCLC comes for follow up     Oncologic Hx:    Diagnosis:   Stage IV NSCLC, Rt lung adenocarcinoma with metastasis to pleura, mediastinum , rt pleural effusion and brain diagnosed 1/2022 (AJCC 8th edition)  PD-L1 TPS 2-3% by [place]   NGS [facility] panel-EML4:ALK rearragement  NGS Guardant- GNAS R201H, KRAS K5E- No ALK    Treatment:   2/23/2022- current: Brigatinib. Dose reduced to 60 mg due to cough after two days of 90 mg daily -->back on 90 mg---> increased to 180 mg  ---> settled on 120 mg    6/27/23- Right stealth craniotomy and resection of tumor:     7/20/23- now- Bevacizumab for radiation necrosis        )  Past:  2/15/22- GK to 11 brain lesions  3/2/22- 12/2022 Alectinib 300 mg BID (Dose reduced to 450 mg BID from 600 mg BID due to grade 3 myalgias 3/21/22, again reduced to 300 mg BID 9/28/22)  6/28/22- Craniotomy, resection  9/28/22-10/26- Bevacizumab for radiation necrosis (stopped due to PE)      Intent of treatment: Palliative    Oncologic course:  1/19/22 to 1/22/22-Admitted to [facility] for 2 week progressive SOB secondary to have large rt sided pleural effusion, needing thoracentesis x2 (1.7L  and 2.0 L removed), cytology positive for malignancy, adenocarcinoma.   1/26/22- Rt pleural mass biopsy-Dr. Agrawal--POSITIVE FOR ADENOCARCINOMA CONSISTENT WITH LUNG PRIMARY, admixed with mesothelial hyperplasia and inflammatory infiltrate (+ TTF-1 and CK 7;  negative  p40, calretinin and WT-1. PAX8 immunostain focal +). 4th thoracentesis done simultaneously - 3L approx removed.   2/1/22- PET/CT-Right lower lobe central infiltrative FDG avid 8.2 x 9.6 cm mass representing a primary lung adenocarcinoma. Ipsilateral right perihilar, bilateral pretracheal, subcarinal and superior mediastinal michele metastases. Contralateral mildly FDG avid few lung nodules are suspicious for contralateral metastasis. At least 3 intracranial metastases in the right frontal lobe, left frontal lobe and left cerebellar hemisphere. Nonspecific mild diffuse bone marrow uptake. Further evaluation with a spine MRI could be considered to rule out early marrow infiltration. This could also be seen with red marrow conversion.  2/5/22-  Brain MRI- At least 9 intracranial metastases as detailed above. The dominant lesions involving the orbital right frontal lobe, the posterior left middle frontal gyrus, anterior right temporal lobe and in the left cerebellar hemisphere have surrounding moderate vasogenic type edema.  2/15/22- Saw Dr. Arango from Rad Onc- Rcd GK to 12 lesion in bran  2/16/22- Pleurex placement   3/2/22- Started Alectinib 600 mg BID  3/21/22- Dose reduced to 450 mg BID due to grade 3 myalgias and fatigue  4/2/22 to 4/5/22- Admitted at [facility] for- Severe sepsis due to MSSA infection of right PleurX catheter s/p removal- He presented with onset of pain at tube site starting 4/1; at arrival was tachycardic with leukocytosis (22.7) and elevated lactic acid (2.9).  CT chest showed fluid and stranding tracking outside the pleural space into chest wall along pleural catheter.  IR was consulted and removed catheter 4/2 with report of  pustular drainage and tip culture growing MSSA.  Thoracic Surg was consulted who felt no surgical indication necessary given minimal pleural fluid and lack of any signs of abscess.  Initially treated with broad spectrum coverage for sepsis, narrowed to Ancef once sensitivities returned with plan to transition to cefadroxil for an additional 10 days at discharge per ID. Held drug 4/2 to 4/11 5/2/22- CT CAP- Overall, positive response to therapy with decreased size of right lower lobe and right pleural-based masses, pulmonary metastases, hilar and mediastinal lymphadenopathy. However, a single right posterior pleural-based mass has slightly increased in size since 2/24/2022. No metastatic disease in the abdomen and pelvis. Right Pleurx catheter has been removed. Trace right pleural effusion and right basilar atelectasis.  5/2/22- Brain MRI- The previously demonstrated brain metastases are mildly diminished in size versus to 2/5/2022. The degree of edema is also diminished but not completely resolved. Probable trace amounts of intralesional bleeding demonstrated on the gradient sequence within the metastases. No definite new metastasis or progressive mass effect. No hydrocephalus or infarct.    6/15/22 to 6/17/22- Admitted at [facility]-with aphasia and word finding difficulty over last few weeks.  He presented to [facility] ED on 6/10 for evaluation of his symptoms. MRI brain showed multiple intracranial metastases, with interval enlargement of the dominant lesion within the left frontal lobe and increased surrounding vasogenic edema with 2 mm rightward shift of the septum pellucidum. Due to his worsening anxiety, he left AMA. His symptoms continued to progress to where he could not write at work so he decided to go to the ED for re-evaluation and treatment. Evaluated by [facility] (radiaiton necrosis vs tumor progression).  6/16/22- MR Brain (6/16) shows multiple intracranial metastases, with interval enlargement  of the  dominant lesion within the left frontal lobe and increased surrounding vasogenic edema with 2 mm rightward shift of the septum pellucidum.  6/16/22- - CT CAP shows slightly decreased size of right lower lobe and right pleural-based masses. No new pulmonary nodules or lymphadenopathy; No evidence of metastatic disease in the abdomen or pelvis.   6/28/22 to 6/30/22- Admitted at [facility]- Elective left Stealth craniotomy with resection of brain tumor due to ongoing symptoms. No intraoperative complications. EBL 50 ml.  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  7/5/22- Ct CAP- Right lower lobe low-density nodules are not significantly changed. A small left upper lobe pulmonary nodule is also unchanged. Trace pleural fluid on the right has increased slightly. No convincing evidence for metastatic disease in the abdomen or pelvis.  7/18/22 to 7/19/22- Admitted to [facility] for seizure- Reportedly was only taking once Keppra instead of twice daily. Also resumed on dexamethasone 2 mg daily  8/1/22- Brain MRI- Redemonstrated postsurgical changes status post left frontoparietal Craniotomy. Interval increase in size of the dominant ring-enhancing lesion in the left posterior superior frontal lobe with increased moderate surrounding vasogenic edema and local mass effect resulting in narrowing of the supratentorial ventricular system. No significant midline shift/herniation at this time    8/1/22- Dex was increased to 4 mg daily by Dr. Moran    9/1/22- CT Chest- Near resolution of previously seen right pleural nodule. Stable right lower lobe pulmonary nodule    9/28/22- Bevacizumab for radiation necrosis    10/26/22- Bevacizumab     10/26/22- Ct CAP- Stable posterior medial right lower lobe 1.9 x 1.1 cm nodule series 8 image 176. Adjacent stable scarring and atelectasis. The previously noted pleural nodule posteriorly on the right is not currently clearly identified. Stable left upper lobe 0.3 cm nodule image 56    10/26/22-  Brain MRI- Overall improved appearance of multiple intracranial metastases with near resolution of associated edema and diminished enhancement and size of multiple residual lesions. No definite new or progressive metastasis.    11/7/22 to 11/9/22- Admitted for PE and HTN urgency- Small pulmonary embolism in the right lower lobe pulmonary artery. started on Lovenox, Brain MRI neg for PRES.    12/29/22- ED visit- bilateral hip pain, pain in shoulders, knuckles, knees, and ankles- holding alectinib since 12/20/22 1/6/23- Ct CAP- Mild groundglass nodularity in the left upper lobe is new since the previous exam, and may be infectious in etiology. No other significant interval change. Pulmonary nodules are not significantly changed.    1/6/23- Brain MRI- Stable to diminished sequelae of intracranial metastasis and treatment changes. No new or progressive metastasis. No superimposed acute intracranial finding.     2/23/2022- Start Brigatinib. Dose reduced to 60 mg due to cough after two days of 90 mg daily -  3/23/23-Brigatinib  (increase to 90 mg)    4/20/23- CT CAP- Stable- Previously noted mild groundglass nodularity in the left upper lobe has resolved.Pulmonary nodules are unchanged.Trace amount pleural fluid on the right has decreased slightly.    4/20/23- Brain MRI- Possile progression- Largest metastasis within the right frontal lobe has increased in size with worsening peripheral nodular enhancement and worsening vasogenic edema contributing to new right to left midline shift.  Multiple new/enlarging metastases scattered throughout the cerebral hemispheres and cerebellum. Started on dexamethasone by NGS on 4/28/23 5/17/23- presents to clinic with glucose 627, admission to hospital for stability on insulin drip    6/16/23- Brain MRI- Interval increase in size of the necrotic lesion in the anterior aspect of the right frontal lobe from 2.4 x 2.3 x 2.4 cm previously to 3.0 x 3.5 x 2.8 cm on the current study.  Mass effect due to slightly increased vasogenic edema surrounding the right frontal lesion resulted in increase of leftward midline shift of the anterior interhemispheric fissure from 0.7 cm previously to 0.9 cm currently. Interval increase in size in two adjacent metastases at the frontoparietal junction on the left. The remaining scattered intra-axial enhancing nodules are not changed appreciably in size or appearance since the comparison study.No evidence for acute intracranial pathology.   Dr. Moran- Due to worsening headaches and more problems with walking. Recommended a right Stealth craniotomy for resection of the lesion.     6/27/23- Right stealth craniotomy and resection of tumor: Final path: radiation necrosis    7/10/23- Ct CAP- stable Stable exam without evidence for new disease.Stable pulmonary nodules including a dominant nodular opacity at the right lower lobe.    7/20/23- Bevacizumab for radiation necrosis    8/16/23- Bevacizumab     9/12/23- Ct CAP-  Stable right lower lobe dominant nodular opacity. No new disease in the chest, abdomen and pelvis.     9/15, 10/6, 10/27, 11/17-  Bevacizumab    10/25/23- MRI Brain- 1. Redemonstrated postoperative changes of right frontal craniotomy. Decreased size of the right frontal resection cavity with significant decrease in the surrounding right frontal lobe vasogenic edema seen on the previous study. Mass effect has essentially resolved in the interim and there is no longer any midline shift. In the interim, slightly increased thickness of a rind of peripheral nodular enhancement along the posterior inferior and medial aspects of the right frontal lobe resection cavity, indeterminate. There is no significant elevated cerebral blood volume in this area. The findings may represent evolving posttreatment changes; however, residual tumor is not excluded.  Stable postoperative changes status post left anterior parietal craniotomy with irregular enhancement in the  left frontal lobe parenchyma underlying the resection cavity, likely due to posttreatment change. Other scattered supratentorial and infratentorial metastatic lesions are overall similar to slightly decreased in size, as described.    12/5/23- PET/CT- with sole site of disease in the RLL measuring 1.6 x 1.4 cm and with SUV max of 4.2. No other sites of disease.     He works as a maintenance manger for apartment complexes    Interval Hx:    Presents on video visit to discuss PET/CT results, which showed minimal disease burden and only area of question in the RLL, consistent with prior CT scans.    He is generally doing reasonably well. Continues to have consistent myalgias, now somewhat improved and manageable with analgesics (managed by Dr. Brewer and palliative care). These myalgias are substantially worse during the week following his bevacizumab infusions, and gradually improve to about baseline within a week or so. Still manageable but much tougher during this period. He denies any additional side effects associated with the bevacizumab infusions. No other new symptoms or concerns. Denies any neurological issues.    He is taking his apixaban and denies any bleeding issues.    We reviewed his imaging and next steps.        ECOG PS 0-1    REVIEW OF SYSTEMS: 14 point ROS negative other than the symptoms noted above in the HPI.    Wt Readings from Last 4 Encounters:   11/17/23 98 kg (216 lb)   10/27/23 98.3 kg (216 lb 11.2 oz)   10/23/23 97.5 kg (215 lb)   10/06/23 97.9 kg (215 lb 14.4 oz)      Review of Systems:  A comprehensive ROS was performed and found to be negative or non-contributory with the exception of that noted in the HPI above.    Past Medical History:  GERD  Hypertension, not on medication  Type 2 diabetes mellitus, not on medications currently, previously on Metformin    Past Surgical History:  Past Surgical History:   Procedure Laterality Date    BRONCHOSCOPY RIGID OR FLEXIBLE W/TRANSENDOSCOPIC  ENDOBRONCHIAL ULTRASOUND GUIDED Bilateral 2022    Procedure: Right BRONCHOSCOPY, FIBEROPTIC, endobronchial ultrasound, pleural biopsy;  Surgeon: Dallin Agrawal MD;  Location: [facility] OR    INJECT BLOCK MEDIAL BRANCH CERVICAL/THORACIC/LUMBAR      INSERT CHEST TUBE Right 2022    Procedure: INSERTION, CATHETER, INTERCOSTAL, FOR DRAINAGE;  Surgeon: Dallin Agrawal MD;  Location: [facility] GI    INSERT CHEST TUBE Right 3/9/2022    Procedure: INSERTION, CATHETER, INTERCOSTAL, FOR DRAINAGE;  Surgeon: Sushila Antonio MD;  Location: [facility] GI    IR CHEST TUBE REMOVAL TUNNELED RIGHT  2022    OPTICAL TRACKING SYSTEM CRANIOTOMY, EXCISE TUMOR, COMBINED Left 2022    Procedure: Left stealth craniotomy for tumor resection with motor mapping;  Surgeon: Stephen Moran MD;  Location:  OR    OPTICAL TRACKING SYSTEM CRANIOTOMY, EXCISE TUMOR, COMBINED Right 2023    Procedure: Right stealth craniotomy and resection of tumor;  Surgeon: Stephen Moran MD;  Location:  OR    ORTHOPEDIC SURGERY      Ganesh. Rotator cuff repair.    PLEUROSCOPY N/A 2022    Procedure: Pleuroscopy with Pleural Biopsy;  Surgeon: Dallin Agrawal MD;  Location:  OR       Social History:  Lives with wife and 4 kids in [place]. Works as a  for an apartment complex in [place]. Exposure to household chemicals and . No significant exposure to asbestos. No signal exposure to benzene or similar chemicals. No significant smoking history-states that he smoked 1 to 2 cigarettes occasionally per month for about 2 years in college, non-smoking since then. No significant alcohol use history. No other recreational substances. Good support system. Kids are 23, 19, 17 and 13.    Family History  Significant history for cancers on maternal side. Mother  of uterine cancer. 2 maternal uncles have possible metastatic melanoma.    Outpatient Medications:  Current Outpatient Medications   Medication    acetaminophen  (TYLENOL) 325 MG tablet    albuterol (PROAIR HFA/PROVENTIL HFA/VENTOLIN HFA) 108 (90 Base) MCG/ACT inhaler    Alcohol Swabs PADS    baclofen (LIORESAL) 10 MG tablet    blood glucose (NO BRAND SPECIFIED) lancets standard    blood glucose (NO BRAND SPECIFIED) test strip    blood glucose monitoring (SOFTCLIX) lancets    Blood Glucose Monitoring Suppl (ACCU-CHEK GUIDE) w/Device KIT    brigatinib (ALUNBRIG) 30 MG TABS tablet    Continuous Blood Gluc Sensor (FREESTYLE IRENE 2 SENSOR) MISC    DULoxetine (CYMBALTA) 30 MG capsule    hydrochlorothiazide (HYDRODIURIL) 50 MG tablet    insulin aspart (NOVOLOG PEN) 100 UNIT/ML pen    insulin glargine (LANTUS PEN) 100 UNIT/ML pen    insulin pen needle (32G X 4 MM) 32G X 4 MM miscellaneous    levETIRAcetam (KEPPRA) 1000 MG tablet    lidocaine-prilocaine (EMLA) 2.5-2.5 % external cream    lisinopril (ZESTRIL) 40 MG tablet    methadone (DOLOPHINE) 5 MG tablet    naloxone (NARCAN) 4 MG/0.1ML nasal spray    oxyCODONE IR (ROXICODONE) 10 MG tablet    propranolol (INDERAL) 20 MG tablet    rivaroxaban ANTICOAGULANT (XARELTO) 20 MG TABS tablet    [employer] Container MISC     No current facility-administered medications for this visit.     PHYSICAL EXAMINATION ATTAINABLE DURING VIDEO VISIT:  CONSTITUTIONAL - Pt looks like stated age, pleasant, not in acute distress. Not obese.  NEURO: Oriented to time, person, and places. No tremor.  ENT, MOUTH: Pupils are equal.  Sclerae are anicteric.  Moist oral mucosa. No oral thrush.   NECK:  No jugular venous distention.  No thyroid enlargement.   RESPIRATORY: talk nl, no sob during conversation, no cough.   MUSCULOSKELETAL/EXTREMITIES:  No edema.  No joint deformity. Normal range of motion  SKIN:  No petechiae.  No rash.  No signs of cellulitis.   PSYCHIATRIC: Normal mood and affect. Good memory. Proper insight and judgement.       Labs & Studies: I personally reviewed the following studies:  Most Recent 3 CBC's:  Recent Labs   Lab Test 11/10/23  3349  11/06/23  1256 10/27/23  1056   WBC 6.5 11.3* 8.7   HGB 13.4 13.7 13.9   MCV 95 94 90    205 228     Most Recent 3 BMP's:  Recent Labs   Lab Test 11/17/23  1325 11/10/23  1309 11/06/23  1256    143 143   POTASSIUM 4.5  4.5 3.6 4.0   CHLORIDE 107 106 103   CO2 25 28 29   BUN 16.9 17.4 14.2   CR 0.75 0.67 0.86   ANIONGAP 13 9 11   TORY 9.3 8.9 9.0   GLC 86 177* 143*    Most Recent 2 LFT's:  Recent Labs   Lab Test 11/17/23  1325 11/10/23  1309   AST 34 19   ALT 12 14   ALKPHOS 75 69   BILITOTAL 0.3 0.3    Most Recent TSH and T4:  Recent Labs   Lab Test 09/12/22  1642   TSH 2.56        ASSESSMENT AND PLAN:  Stage IV NSCLC, Rt lung adenocarcinoma with metastasis to pleura, mediastinum , rt pleural effusion and brain diagnosed 1/2022 (AJCC 8th edition)  PD-L1 TPS 2-3% by [place]   NGS [facility] panel-EML4:ALK rearragement; chr2:[id number], chr2:[id number]  NGS [facility]- GNAS R201H, KRAS K5E- No ALK    He began Alectinib 600 mg BID 3/2/22 and unfortunately developed grade 3 myalgias which have improved with lowering the dose 450 mg BID. He was holding drug 4/2/22 to 4/11/22 due to MSSA infection from pleurex which is removed. Resumed at 450 mg BID. Due to ongoing myalgias (Although CK is normal), we dose reduced to 300 mg BID.   In Dec 2022, developed Gr 3 arthralgia, and we stopped drug 12/20/22. Had unremitting arthalgias, eventully had to starte PO steroids which led to improvement and resolved. Radiographicaly, he has had a near CR to Rx in the lung, has a residual rt LL lesion.     Began brigatinib 2/22/23 (delayed due to pt hesitancy). Developed severe cough on day 2 so brigatinib was held and cough resolved with in 24-48 hours. He restarted 60 mg daily and upped it to 90 mg.Restging CT showing stable disease in the lung, however, MRI with several contrast enhancement and increase in size of previously treated lesions. His dose was increased to 180 mg daily to address possible CNS disease progression and  started on dexamethasone. 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 CT in [place] 20123 showing stbale disease.   Following surgery, we reduced to 120 mg/day due to worsenign joint aches/stiffness. Restaging CT in 9/2023 showing overall disease stablity with no evidence of active cancer. We opted to continue Brigatinib at 120 mg and restage in 3 months. PET/CT after these three months showed oligoperisstent disease with a single focus of active malignancy in the RLL. His brain imaging has shown improved vasogenic edema. We will consider resection (vs. SBRT) to this oligometastatic site.    Plan  - Continue brigatinib at 120 mg  - Will discuss case at tumor conference with consideration for resection  - RTC with CT in 3 months      NEURO  # Brain mets:   # Radiation necrosis, improving  -Baseline Brain MRI with several brain mets, s/p GK to 11-12 brain mets. F/u Brain MRI in June 2022 was showing enlargement of the one of the lesions along with edema, therefore had to undergo craniotomy followed by resection, the final biopsy consistent with radiation necrosis.   -Currently on bevacizumab for radiation necrosis --15 mg/kg every 3 weeks S/P 6 doses  (Of note, Connor S/p 2 doses of Bevacixumab fall of 2022, stopped due HTN urgency and PE.) MRI in 4/2023 now showing contrast enhancement of lesions and a dominate lesion in the R frontal region with edema, several other smaller lesion. Short term MRI showing increase in size of the rt frontal lesion, underwent resection, patho again showing radiation necrosis. Restarted bevacizumab, which resulted in improved radiation necrosis / vasogenic edema on imaging in 10/2023. BP has been reasonably controlled. We will plan to continue bevacizumab until at least his next imaging, with consideration to stop at that time if imaging shows ongoing improvement.   - Continue bevacizumab until next imaging (end of January/Feb 2024)   -  Continue to monitor BP closely, now with improved control on antihypertensives    # Headache  -Present since recent craniotomy on 6/27/23. MRI brain on 7/27/23 with reduced vasogenic edema. Small dose of Dex + taper in August, these have resolved.  No further headaches        Elevated Lipase  -Normal range most recently     Diabetes, Type 2  Reports improved control. Currently running about 150-170 at home  --on Metformin and insulin     PE: provoked by malignancy vs bevacizumab in 11/2022  -- on rivaroxiabn 20 mg daily  -- of note, Connor not taking this.  From notes I could find he stopped (?unsure why) in April.  Given hypercoagulability of cancer and h/o of clotting on Avastin, I restarted this in October     Grade 2-3 arthralgias  All joints affected, no morning stiffness, normal ESR and CRP  Recently has noticed more fatigue and stifnnes in body.  2/2 to brigatinib and possible worsening with Avastin.   -following with palliative;   -on methadone BID;Celecoxib 100 mg po BID  -transitioned to Cymbalta.  This initially helped his arthralgias.  We increased to twice daily.  Unfortunately he was unable to notice much difference.   -Continues on oxycodone 2-3 times daily     Hematuria: resolved on repeat [facility]      #normocytic anemia: sudden drop to <7, requiring 1 unit blood transfusion. Lab work up unrevealing. Has recovered to baseline  -consider EGD if hgb drops again, risk for GI bleed with chronic steroid use           #right chest/rib pain---> intermittent- improved  Reproducible and intermittent, worse with movement of torso. Unsure of etiology but we agreed to monitor given mild, possibly msk, and no correlating respiratory or cardiac symptoms.          #MSSA infection, Sepsis at pleurex site- resolved  # Pleural effusion: s/p pleurex palcement  2/16/22. Then on 4/2 right PleurX catheter s/p removal for severe sepsis due to MSSA infection.     The patient was seen by and discussed with   Cori.    Lance Hess MD PhD  Hematology/Oncology Fellow  Pgr: [phone]    The pt was evaluated with resident/fellow and the note was edited to relflect the combined assessment and plan    On the day of service  Chart review: 5 minutes  Visit duration: 30 minutes  Care coordination: 5 minutes    Bassam Persaud MD    Hematology, Oncology and Transplantation

## 2023-12-07 DIAGNOSIS — C34.31 MALIGNANT NEOPLASM OF LOWER LOBE OF RIGHT LUNG (H): Primary | ICD-10-CM

## 2023-12-12 ENCOUNTER — TUMOR CONFERENCE (OUTPATIENT)
Dept: ONCOLOGY | Facility: CLINIC | Age: 43
End: 2023-12-12
Payer: MEDICAID

## 2023-12-12 ENCOUNTER — PATIENT OUTREACH (OUTPATIENT)
Dept: ONCOLOGY | Facility: CLINIC | Age: 43
End: 2023-12-12

## 2023-12-12 DIAGNOSIS — C79.31 MALIGNANT NEOPLASM METASTATIC TO BRAIN (H): ICD-10-CM

## 2023-12-12 DIAGNOSIS — C78.01 MALIGNANT NEOPLASM METASTATIC TO BOTH LUNGS (H): ICD-10-CM

## 2023-12-12 DIAGNOSIS — C34.31 MALIGNANT NEOPLASM OF LOWER LOBE OF RIGHT LUNG (H): Primary | ICD-10-CM

## 2023-12-12 DIAGNOSIS — C78.02 MALIGNANT NEOPLASM METASTATIC TO BOTH LUNGS (H): ICD-10-CM

## 2023-12-12 NOTE — TUMOR CONFERENCE
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 Having frequent issues with systemic Tx and on a lot of support meds, removal would provide opportunity for longer breaks. Surgical will see Pt for eval with intent to resect. Surg is supported over SBRT for follow-up purposes.    Documentation / Disclaimer Cancer Tumor Board Note  Cancer tumor board recommendations do not override what is determined to be reasonable care and treatment, which is dependent on the circumstances of a patient's case; the patient's medical, social, and personal concerns; and the clinical judgment of the oncologist [physician].

## 2023-12-12 NOTE — PROGRESS NOTES
New Patient Oncology Nurse Navigator Note     Referring provider: Dr. Bassam Persaud    Referring Clinic/Organization: [facility]  Referred to: Thoracic Surgery  Requested provider (if applicable): Dr. Fu  Referral Received: 12/12/23       Evaluation for : [id number] (ICD-10-CM) - Malignant neoplasm of lower lobe of right lung (H)C78.01, C78.02 (ICD-10-CM) - Malignant neoplasm metastatic to both lungs (H)C79.31 (ICD-10-CM) - Malignant neoplasm metastatic to brain (H)     Clinical History (per Nurse review of records provided):      01/21/2022 Non-gynecologic Cytology (bookmarked) showed:   Final Diagnosis   Pleural effusion, right                 Interpretation:                  Positive for malignancy; adenocarcinoma, moderately differentiated - see description                 Adequacy:                 Satisfactory for evaluation         Electronically signed by Andrew Hui MD on 1/27/2022 at  9:21 AM       01/26/2022 Surgical Pathology (bookmarked) showed:   Final Diagnosis   A. RIGHT PLEURAL MASS, BIOPSY:  -POSITIVE FOR ADENOCARCINOMA CONSISTENT WITH LUNG PRIMARY, admixed with mesothelial hyperplasia and inflammatory infiltrate, see comment.   Electronically signed by Sony Redd MD on 2/1/2022 at  8:55 AM     Clinical Information  [facility]   Procedure:  Right BRONCHOSCOPY, FIBEROPTIC, endobronchial ultrasound, pleural biopsy - Bilateral  Pleuroscopy with Pleural Biopsy  Pre-op Diagnosis: Mediastinal lymphadenopathy [R59.0]  Post-op Diagnosis: R59.0 - Mediastinal lymphadenopathy [ICD-10-CM]     41 year-old male with massive right pleural effusion, mediastinal lymph node enlargement, and left lung pulmonary nodule.       12/05/2023 PET/CT (bookmarked) showed:   MPRESSION:     Mild FDG uptake associated with a stable nodular opacity in the right lower lobe which could represent a site of residual disease versus posttreatment inflammation. No additional site of FDG avid  disease.    Clinical Assessment / Barriers to Care (Per Nurse):    Never smoker  Records Location: [facility]     Records Needed: None  Additional testing needed prior to consult: PFTs  Referral updates and Plan:     Writer called Connor and discussed the referral with him. He confirmed is ok with scheduling with Dr. Fu. Per Areli DALTON RNCC ok to double book Dr. Fu with a return pt this week.     Writer will have schedulers call Connor to finalize the appts. All questions answered.     VINH DaughertyN, RN, OCN  [facility] Oncology Nurse Navigator  [phone] / [phone]

## 2023-12-14 ENCOUNTER — PRE VISIT (OUTPATIENT)
Dept: PULMONOLOGY | Facility: CLINIC | Age: 43
End: 2023-12-14

## 2023-12-14 ENCOUNTER — ONCOLOGY VISIT (OUTPATIENT)
Dept: PULMONOLOGY | Facility: CLINIC | Age: 43
End: 2023-12-14
Attending: STUDENT IN AN ORGANIZED HEALTH CARE EDUCATION/TRAINING PROGRAM
Payer: COMMERCIAL

## 2023-12-14 VITALS
SYSTOLIC BLOOD PRESSURE: 161 MMHG | BODY MASS INDEX: 31.81 KG/M2 | WEIGHT: 215.4 LBS | HEART RATE: 59 BPM | DIASTOLIC BLOOD PRESSURE: 111 MMHG | OXYGEN SATURATION: 97 %

## 2023-12-14 DIAGNOSIS — C78.01 MALIGNANT NEOPLASM METASTATIC TO BOTH LUNGS (H): ICD-10-CM

## 2023-12-14 DIAGNOSIS — C78.02 MALIGNANT NEOPLASM METASTATIC TO BOTH LUNGS (H): ICD-10-CM

## 2023-12-14 DIAGNOSIS — C34.31 MALIGNANT NEOPLASM OF LOWER LOBE OF RIGHT LUNG (H): ICD-10-CM

## 2023-12-14 DIAGNOSIS — C79.31 MALIGNANT NEOPLASM METASTATIC TO BRAIN (H): ICD-10-CM

## 2023-12-14 PROCEDURE — 99202 OFFICE O/P NEW SF 15 MIN: CPT | Performed by: THORACIC SURGERY (CARDIOTHORACIC VASCULAR SURGERY)

## 2023-12-14 NOTE — PROGRESS NOTES
I saw Connor and his wife, Rohini today.    After reviewing his chart carefully and talking to them at length, I think surgery is not his best option. He will have significant pleural scarring that will make surgery significantly harder and increases the risks of complications or thoracotomy. Connor also has a lot of comorbidities, that further increase the risk of a complication.    Since a wedge resection in his case is not a low risk procedure, I recommend against it.    Connor and Rohini had all their questions answered and agreed with the plan.    BP (!) 161/111 (BP Location: Right arm, Patient Position: Sitting, Cuff Size: Adult Regular)   Pulse 59   Wt 97.7 kg (215 lb 6.4 oz)   SpO2 97%   BMI 31.81 kg/m      His initial BP was 161/111; after the visit, and after seeing Star, his pressure was 170/105, which is not unusual for him.

## 2023-12-15 ENCOUNTER — MYC REFILL (OUTPATIENT)
Dept: ONCOLOGY | Facility: CLINIC | Age: 43
End: 2023-12-15
Payer: MEDICAID

## 2023-12-15 ENCOUNTER — MYC REFILL (OUTPATIENT)
Dept: RADIATION ONCOLOGY | Facility: HOSPITAL | Age: 43
End: 2023-12-15
Payer: MEDICAID

## 2023-12-15 DIAGNOSIS — R51.9 NONINTRACTABLE EPISODIC HEADACHE, UNSPECIFIED HEADACHE TYPE: ICD-10-CM

## 2023-12-15 DIAGNOSIS — D49.6 BRAIN TUMOR (H): ICD-10-CM

## 2023-12-15 DIAGNOSIS — G89.3 CANCER ASSOCIATED PAIN: ICD-10-CM

## 2023-12-15 DIAGNOSIS — Z98.890 S/P CRANIOTOMY: ICD-10-CM

## 2023-12-15 RX ORDER — OXYCODONE HYDROCHLORIDE 10 MG/1
10 TABLET ORAL
Qty: 90 TABLET | Refills: 0 | Status: SHIPPED | OUTPATIENT
Start: 2023-12-19 | End: 2024-01-01

## 2023-12-15 RX ORDER — METHADONE HYDROCHLORIDE 5 MG/1
TABLET ORAL
Qty: 45 TABLET | Refills: 0 | Status: SHIPPED | OUTPATIENT
Start: 2023-12-15 | End: 2023-12-27

## 2023-12-15 RX ORDER — PROPRANOLOL HYDROCHLORIDE 20 MG/1
40 TABLET ORAL 3 TIMES DAILY
Qty: 180 TABLET | Refills: 1 | OUTPATIENT
Start: 2023-12-15

## 2023-12-15 NOTE — TELEPHONE ENCOUNTER
Received [employer] message from patient requesting refill of Methadone and oxycodone    Last refill: methadone 11/9/2023  Last refill oxycodone 12/8/2023  Last office visit: 10/23/203  Scheduled for follow up 12/19/2023     Will route request to MD for review.     Reviewed [place]  Report.

## 2023-12-15 NOTE — TELEPHONE ENCOUNTER
Medication:Propranolol  Last prescribing provider:Stella Street PA-C  Last clinic visit date: 12/6/23 Dr. Persaud  Recommendations for requested medication:n/a  Pharmacy transfer request  Any other pertinent information:this writer spoke to   [facility]- pharmacy with [facility] and they were able to transfer refill prescription on file with GENAOR Webb.

## 2023-12-18 ENCOUNTER — MYC MEDICAL ADVICE (OUTPATIENT)
Dept: FAMILY MEDICINE | Facility: CLINIC | Age: 43
End: 2023-12-18

## 2023-12-18 RX ORDER — PROPRANOLOL HYDROCHLORIDE 20 MG/1
40 TABLET ORAL 3 TIMES DAILY
Qty: 180 TABLET | Refills: 1 | OUTPATIENT
Start: 2023-12-18

## 2023-12-19 ENCOUNTER — VIRTUAL VISIT (OUTPATIENT)
Dept: RADIATION ONCOLOGY | Facility: HOSPITAL | Age: 43
End: 2023-12-19
Attending: FAMILY MEDICINE
Payer: COMMERCIAL

## 2023-12-19 VITALS — BODY MASS INDEX: 31.84 KG/M2 | WEIGHT: 215 LBS | HEIGHT: 69 IN

## 2023-12-19 DIAGNOSIS — Z51.5 PALLIATIVE CARE PATIENT: Primary | ICD-10-CM

## 2023-12-19 DIAGNOSIS — Z98.890 S/P CRANIOTOMY: ICD-10-CM

## 2023-12-19 DIAGNOSIS — G89.3 CANCER ASSOCIATED PAIN: ICD-10-CM

## 2023-12-19 DIAGNOSIS — C79.31 METASTASIS TO BRAIN (H): ICD-10-CM

## 2023-12-19 DIAGNOSIS — C34.90 NON-SMALL CELL LUNG CANCER, UNSPECIFIED LATERALITY (H): ICD-10-CM

## 2023-12-19 DIAGNOSIS — F43.23 ADJUSTMENT DISORDER WITH MIXED ANXIETY AND DEPRESSED MOOD: ICD-10-CM

## 2023-12-19 PROCEDURE — 99442 PR PHYSICIAN TELEPHONE EVALUATION 11-20 MIN: CPT | Mod: 93 | Performed by: FAMILY MEDICINE

## 2023-12-19 ASSESSMENT — PAIN SCALES - GENERAL: PAINLEVEL: NO PAIN (0)

## 2023-12-19 NOTE — PROGRESS NOTES
Virtual Visit Details    Type of service:  Video Visit   Video Start Time:  1600  Video End Time: 1612  Telephone start 1612  Telephone stop 1625  Telephone total time 13 minutes  Originating Location (pt. Location): Home    Distant Location (provider location):  On-site  Platform used for Video Visit: [facility]    Palliative Care Outpatient Clinic Progress Note    Patient Name: Connor Emerson  Primary Provider: Bassam Persaud    Impression & Recommendations & Counseling:  Connor Emerson is a 42 year old male with history of NSCLC with ALK rearrangement and mets to the brain s/p gamma knife treatments and craniotomy open excision and currently on a second line TKI. He is experiencing 'stiffness' from the TKI.  ECO  Decisional Capacity: very present     PDMP review:  Yes, no concerns     Metastatic NSCLC with ALK rearrangement  S/p GK to brain mets and craniotomy for excision  Cancer associated pain on Methadone 2.5 mg po in AM and 5 mg at HS and using prn oxycodone 10 mg about TID  TKI associated muscle stiffness with good response to HS flexeril  HTN  Headaches--overall better and Connor prefers to minimize use of steroids, if at all possible.        Goals of Care:  2023  no change in GOC  3/1/2023 Connor wants to continue cancer-directed therapies as long as the side effects are tolerable.  He is a FULL code should he have a cardiac arrest. He is OK being cared for in the acute care hospital if needed.     Recommendations & Counseling:  Continue cancer care per Dr. Persaud and his team  Continue Methadone 2.5 mg po in AM and 5 mg at HS ; he has not felt over sedated though he does have some fatigue  Continue oxycodone 5-10 mg po q 4 hours prn;  Continue to hold Ambien   Continue Duloxetine.  Narcan nasal spray rx also sent to pharmacy last visit     OK to try lidocaine patches along his old  right sided chest scars that are bothersome.    Start flexeril 5 mg at HS to see if it helps morning stiffness;    UTD and [facility] pharmacy resources did not indicate a drug-drug interaction with methadone.     F/up in 8 weeks and sooner prn.           Counseling: All of the above was explained to the patient in lay language. The patient has verbalized a clear understanding of the discussion, asked appropriate questions, which have been answered to patient's apparent satisfaction. The patient is in agreement with the above plan.        Chief Complaint/Patient ID: Connor Emerson 42 year old male with PMHx of  NSCLC with ALK rearrangement and mets to the brain s/p gamma knife treatments and craniotomy open excision and currently on a second line TKI. He is experiencing 'stiffness' from the TKI that has improved with scheduled flexeril at .    Last Palliative care appointment: 10/23/2023 with me     Reviewed: yes, no concerns    Interim History:  Connor Emerson is a 43 year old male who is seen today for follow up with Palliative Care via billable video visit. He reports overall doing well.  He says his last PET scan was negative and though there is a dime sized spot in his lung the surgeons are reluctant to try to biopsy it and think it may represent scar tissue.  He's wondering if he may be getting to a point where he can use the word 'remission.'  I encouraged him to ask Dr. Persaud or Chelsea Villegas about that.     Pain:  Much improved on current regimen.  He has noted some pain along his scars from previous surgeries on his right chest.    Appetite/Nausea: good     Bowels: ok     Sleep: ok     Mood: much improved with the good news of his tumor response     Coping:  good--there is a lot going on in Connor's house--his son is leaving for [place] after [place] for the [employer].  His daughter with developmental delay is having a lot of pain after a surgery she had and they are going to the [facility] for a second opinion soon.    Family History- Reviewed in Epic.    Allergies   Allergen Reactions    Vicodin [Hydrocodone-Acetaminophen] Nausea  and Vomiting and GI Disturbance       Social History:  Pertinent changes to social history/social situation since last visit: none  Key support resources: family--especially wife Kylie  Advance Directive Status:  no ACP documents    Social History     Tobacco Use    Smoking status: Never     Passive exposure: Never    Smokeless tobacco: Never   Vaping Use    Vaping Use: Never used   Substance Use Topics    Alcohol use: Yes     Alcohol/week: 0.0 standard drinks of alcohol     Comment: social    Drug use: No         Allergies   Allergen Reactions    Vicodin [Hydrocodone-Acetaminophen] Nausea and Vomiting and GI Disturbance     Current Outpatient Medications   Medication Sig Dispense Refill    brigatinib (ALUNBRIG) 30 MG TABS tablet Take 4 tablets (120 mg) by mouth daily May be taken with or without food. Swallow whole. Do not crush or chew tablets. 120 tablet 0    DULoxetine (CYMBALTA) 30 MG capsule Take 30 mg once daily x 7 days.  Then increase to 30 mg, twice a day. 60 capsule 3    hydrochlorothiazide (HYDRODIURIL) 50 MG tablet Take 1 tablet (50 mg) by mouth daily 30 tablet 1    insulin aspart (NOVOLOG PEN) 100 UNIT/ML pen Inject 0-40 Units Subcutaneous 3 times daily (before meals) Per discharge instructions sliding scale 45 mL 2    insulin glargine (LANTUS PEN) 100 UNIT/ML pen Inject 70 Units Subcutaneous At Bedtime 30 mL 2    insulin pen needle (32G X 4 MM) 32G X 4 MM miscellaneous Use as directed by provider. Per insurance coverage 100 each 0    levETIRAcetam (KEPPRA) 1000 MG tablet Take 1 tablet (1,000 mg) by mouth 2 times daily 60 tablet 11    lisinopril (ZESTRIL) 40 MG tablet Take 1 tablet (40 mg) by mouth daily 90 tablet 1    methadone (DOLOPHINE) 5 MG tablet Take 0.5 tablets (2.5 mg) by mouth every morning AND 1 tablet (5 mg) at bedtime. 45 tablet 0    oxyCODONE IR (ROXICODONE) 10 MG tablet Take 1 tablet (10 mg) by mouth every 3 hours as needed for moderate pain 90 tablet 0    propranolol (INDERAL) 20 MG  tablet Take 2 tablets (40 mg) by mouth 3 times daily 180 tablet 1    rivaroxaban ANTICOAGULANT (XARELTO) 20 MG TABS tablet Take 1 tablet (20 mg) by mouth daily (with dinner) 90 tablet 3    Sharps Container MISC Use as directed to dispose of needles, lancets and other sharps 1 each 0    acetaminophen (TYLENOL) 325 MG tablet Take 3 tablets (975 mg) by mouth every 8 hours (Patient not taking: Reported on 12/19/2023) 40 tablet 0    albuterol (PROAIR HFA/PROVENTIL HFA/VENTOLIN HFA) 108 (90 Base) MCG/ACT inhaler Inhale 2 puffs into the lungs every 6 hours as needed for shortness of breath, wheezing or cough (Patient not taking: Reported on 12/19/2023) 18 g 0    Alcohol Swabs PADS Use to swab the area of the injection or jabier as directed. Per insurance coverage (Patient not taking: Reported on 12/19/2023) 100 each 0    baclofen (LIORESAL) 10 MG tablet Take 0.5-1 tablets (5-10 mg) by mouth 3 times daily as needed for other (hiccups) (Patient not taking: Reported on 12/19/2023) 60 tablet 4    blood glucose (NO BRAND SPECIFIED) lancets standard To use to test glucose level in the blood Use to test blood sugar  4  times daily as directed. To accompany glucose monitor brands per insurance coverage. (Patient not taking: Reported on 12/19/2023) 100 each 3    blood glucose (NO BRAND SPECIFIED) test strip To use to test glucose level in the blood Use to test blood sugar  4 times daily as directed. To accompany glucose monitor brands per insurance coverage. (Patient not taking: Reported on 12/19/2023) 100 strip 3    blood glucose monitoring (SOFTCLIX) lancets  (Patient not taking: Reported on 12/19/2023)      Blood Glucose Monitoring Suppl (ACCU-CHEK GUIDE) w/Device KIT  (Patient not taking: Reported on 12/19/2023)      brigatinib (ALUNBRIG) 30 MG TABS tablet Take 4 tablets (120 mg) by mouth daily May be taken with or without food. Swallow whole. Do not crush or chew tablets. (Patient not taking: Reported on 12/19/2023) 120 tablet  0    Continuous Blood Gluc Sensor (FREESTYLE IRENE 2 SENSOR) [facility] 1 each every 14 days Apply as directed per  instructions (Patient not taking: Reported on 12/19/2023) 2 each 11    lidocaine-prilocaine (EMLA) 2.5-2.5 % external cream Apply topically as needed (pain due to port access) Apply to port 30 minutes prior to lab appointment. (Patient not taking: Reported on 12/19/2023) 30 g 6    naloxone (NARCAN) 4 MG/0.1ML nasal spray Spray 1 spray (4 mg) into one nostril alternating nostrils as needed for opioid reversal every 2-3 minutes until assistance arrives (Patient not taking: Reported on 12/19/2023) 0.2 mL 3     Past Medical History:   Diagnosis Date    Atypical chest pain 12/02/2013    Cancer (H)     Complication of anesthesia     Diabetes (H)     GERD (gastroesophageal reflux disease) 12/02/2013    History of pulmonary embolism     HTN (hypertension) 05/14/2012    HTN, goal below 140/90 07/02/2013    Insomnia 02/21/2012    Mediastinal lymphadenopathy     Migraine headache 07/02/2013    Migraine with aura, without mention of intractable migraine without mention of status migrainosus     Pneumonia      Past Surgical History:   Procedure Laterality Date    BRONCHOSCOPY RIGID OR FLEXIBLE W/TRANSENDOSCOPIC ENDOBRONCHIAL ULTRASOUND GUIDED Bilateral 1/26/2022    Procedure: Right BRONCHOSCOPY, FIBEROPTIC, endobronchial ultrasound, pleural biopsy;  Surgeon: Dallin Agrawal MD;  Location: [facility] OR    INJECT BLOCK MEDIAL BRANCH CERVICAL/THORACIC/LUMBAR      INSERT CHEST TUBE Right 2/16/2022    Procedure: INSERTION, CATHETER, INTERCOSTAL, FOR DRAINAGE;  Surgeon: Dallin Agrawal MD;  Location: [facility] GI    INSERT CHEST TUBE Right 3/9/2022    Procedure: INSERTION, CATHETER, INTERCOSTAL, FOR DRAINAGE;  Surgeon: Sushila Antonio MD;  Location: [facility] GI    IR CHEST TUBE REMOVAL TUNNELED RIGHT  4/2/2022    OPTICAL TRACKING SYSTEM CRANIOTOMY, EXCISE TUMOR, COMBINED Left 6/28/2022    Procedure: Left stealth craniotomy for tumor  resection with motor mapping;  Surgeon: Stephen Moran MD;  Location:  OR    OPTICAL TRACKING SYSTEM CRANIOTOMY, EXCISE TUMOR, COMBINED Right 2023    Procedure: Right stealth craniotomy and resection of tumor;  Surgeon: Stephen Moran MD;  Location:  OR    ORTHOPEDIC SURGERY      Ganesh. Rotator cuff repair.    PLEUROSCOPY N/A 2022    Procedure: Pleuroscopy with Pleural Biopsy;  Surgeon: Dallin Agrawal MD;  Location: [facility] OR       Physical Exam:   GENERAL APPEARANCE: healthy appearing and much more comfortable than previous visits, alert and no distress; neatly groomed  EYES: Eyes grossly normal to inspection, PERRLA, conjunctivae and sclerae without injection or discharge, EOM intact   RESP:  no increased work of breathing; speaks in complete sentences;   MS: No musculoskeletal defects are noted  SKIN: No suspicious lesions or rashes, hydration status appears adequate with normal skin turgor   PSYCH: Alert and oriented x3; speech- coherent , normal rate and volume; able to articulate logical thoughts, able to abstract reason, no tangential thoughts, no hallucinations or delusions, mentation appears normal, Mood is euthymic. Affect is appropriate for this mood state and bright. Thought content is free of suicidal ideation, hallucinations, and delusions.  Eye contact is good during conversation.       Key Data Reviewed:  LABS: 2023- Cr 0.75, Albumin 4.3,  Hgb 13.4,      EC2023 QTc 462    IMAGIN2023 PET ONCOLOGY (EYES TO THIGHS)  IMPRESSION:     Mild FDG uptake associated with a stable nodular opacity in the right lower lobe which could represent a site of residual disease versus posttreatment inflammation. No additional site of FDG avid disease.    30 minutes spent on the date of the encounter doing chart review, history and exam, patient education & counseling, documentation and other activities as noted above.    Ajith Brewer MD MS FAAFP CAQJohn [facility]. [facility]  Palliative Care Service  Office [phone]  Fax [phone]

## 2023-12-19 NOTE — PATIENT INSTRUCTIONS
It was good to see you today, Connor.  You do look good.  Lila Robbins to you!  I'm glad the results of the PET scan are so positive for you.    Here are the things we talked about:  Continue on the methadone and oxycodone for  your pain.  If things keep improving cut back on the oxycodone but keep using the methadone routinely.  Continue on the flexeril at bedtime.    Someone from the team will reach out to schedule a follow up appointment in 4-6 weeks..     How to get a hold of us:  For non-urgent matters, MyChart works best.    For more urgent matters, or if you prefer not to use MyChart, call our clinic nurse coordinator Reyna Ma RN at [phone]    We have an on-call number for evenings and weekends. Please call this only if you are having uncontrolled symptoms or serious side effects from your medicines: [phone].     For refills, please give us a week (5 working days) notice. We don't always have providers available everyday to do refills. If you call the day you run out of your medicine, we may not be able to refill it in time, so call 5 days in advance!    Ajith Brewer MD MS FAAFP CAQHPM  [facility] Palliative Care Service  Office [phone]  Fax [phone]

## 2023-12-19 NOTE — NURSING NOTE
Is the patient currently in the state of [place]? YES    Visit mode:VIDEO    If the visit is dropped, the patient can be reconnected by: VIDEO VISIT: Text to cell phone:   Telephone Information:   Mobile [phone]       Will anyone else be joining the visit? NO  (If patient encounters technical issues they should call [phone]    How would you like to obtain your AVS? MyChart    Are changes needed to the allergy or medication list? Pt stated no changes to allergies and Pt stated no med changes    Reason for visit: [facility] Olivera VVF

## 2023-12-20 DIAGNOSIS — C34.31 MALIGNANT NEOPLASM OF LOWER LOBE OF RIGHT LUNG (H): Primary | ICD-10-CM

## 2023-12-20 DIAGNOSIS — I67.89 RADIATION THERAPY INDUCED BRAIN NECROSIS: ICD-10-CM
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
## 2023-12-20 RX ORDER — ALBUTEROL SULFATE 90 UG/1
1-2 AEROSOL, METERED RESPIRATORY (INHALATION)
Status: CANCELLED
Start: 2023-12-26

## 2023-12-20 RX ORDER — LORAZEPAM 2 MG/ML
0.5 INJECTION INTRAMUSCULAR EVERY 4 HOURS PRN
Status: CANCELLED | OUTPATIENT
Start: 2023-12-26

## 2023-12-20 RX ORDER — DIPHENHYDRAMINE HYDROCHLORIDE 50 MG/ML
50 INJECTION INTRAMUSCULAR; INTRAVENOUS
Status: CANCELLED
Start: 2023-12-26

## 2023-12-20 RX ORDER — ALBUTEROL SULFATE 0.83 MG/ML
2.5 SOLUTION RESPIRATORY (INHALATION)
Status: CANCELLED | OUTPATIENT
Start: 2023-12-26

## 2023-12-20 RX ORDER — HEPARIN SODIUM (PORCINE) LOCK FLUSH IV SOLN 100 UNIT/ML 100 UNIT/ML
5 SOLUTION INTRAVENOUS
Status: CANCELLED | OUTPATIENT
Start: 2023-12-26

## 2023-12-20 RX ORDER — HEPARIN SODIUM,PORCINE 10 UNIT/ML
5-20 VIAL (ML) INTRAVENOUS DAILY PRN
Status: CANCELLED | OUTPATIENT
Start: 2023-12-26

## 2023-12-20 RX ORDER — EPINEPHRINE 1 MG/ML
0.3 INJECTION, SOLUTION, CONCENTRATE INTRAVENOUS EVERY 5 MIN PRN
Status: CANCELLED | OUTPATIENT
Start: 2023-12-26

## 2023-12-20 RX ORDER — MEPERIDINE HYDROCHLORIDE 25 MG/ML
25 INJECTION INTRAMUSCULAR; INTRAVENOUS; SUBCUTANEOUS EVERY 30 MIN PRN
Status: CANCELLED | OUTPATIENT
Start: 2023-12-26

## 2023-12-21 ENCOUNTER — APPOINTMENT (OUTPATIENT)
Dept: LAB | Facility: CLINIC | Age: 43
End: 2023-12-21
Attending: STUDENT IN AN ORGANIZED HEALTH CARE EDUCATION/TRAINING PROGRAM
Payer: COMMERCIAL

## 2023-12-21 ENCOUNTER — INFUSION THERAPY VISIT (OUTPATIENT)
Dept: ONCOLOGY | Facility: CLINIC | Age: 43
End: 2023-12-21
Attending: STUDENT IN AN ORGANIZED HEALTH CARE EDUCATION/TRAINING PROGRAM
Payer: COMMERCIAL

## 2023-12-21 VITALS
WEIGHT: 217 LBS | TEMPERATURE: 98 F | RESPIRATION RATE: 16 BRPM | HEART RATE: 64 BPM | SYSTOLIC BLOOD PRESSURE: 180 MMHG | DIASTOLIC BLOOD PRESSURE: 94 MMHG | OXYGEN SATURATION: 98 % | BODY MASS INDEX: 32.05 KG/M2

## 2023-12-21 DIAGNOSIS — C34.90 NON-SMALL CELL LUNG CANCER (NSCLC) (H): ICD-10-CM

## 2023-12-21 DIAGNOSIS — Z79.899 ENCOUNTER FOR LONG-TERM (CURRENT) USE OF MEDICATIONS: ICD-10-CM
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
## 2023-12-21 DIAGNOSIS — C34.31 MALIGNANT NEOPLASM OF LOWER LOBE OF RIGHT LUNG (H): Primary | ICD-10-CM

## 2023-12-21 DIAGNOSIS — C79.31 MALIGNANT NEOPLASM METASTATIC TO BRAIN (H): ICD-10-CM

## 2023-12-21 DIAGNOSIS — I67.89 RADIATION THERAPY INDUCED BRAIN NECROSIS: ICD-10-CM

## 2023-12-21 LAB
ALBUMIN SERPL BCG-MCNC: 4.4 G/DL (ref 3.5–5.2)
ALBUMIN UR-MCNC: 30 MG/DL
ALP SERPL-CCNC: 78 U/L (ref 40–150)
ALT SERPL W P-5'-P-CCNC: 12 U/L (ref 0–70)
AMYLASE SERPL-CCNC: 83 U/L (ref 28–100)
ANION GAP SERPL CALCULATED.3IONS-SCNC: 9 MMOL/L (ref 7–15)
AST SERPL W P-5'-P-CCNC: 20 U/L (ref 0–45)
BASOPHILS # BLD AUTO: 0.1 10E3/UL (ref 0–0.2)
BASOPHILS NFR BLD AUTO: 1 %
BILIRUB SERPL-MCNC: 0.5 MG/DL
BUN SERPL-MCNC: 13.3 MG/DL (ref 6–20)
CALCIUM SERPL-MCNC: 9.1 MG/DL (ref 8.6–10)
CHLORIDE SERPL-SCNC: 106 MMOL/L (ref 98–107)
CK SERPL-CCNC: 140 U/L (ref 39–308)
CREAT SERPL-MCNC: 0.61 MG/DL (ref 0.67–1.17)
DEPRECATED HCO3 PLAS-SCNC: 29 MMOL/L (ref 22–29)
EGFRCR SERPLBLD CKD-EPI 2021: >90 ML/MIN/1.73M2
EOSINOPHIL # BLD AUTO: 0.6 10E3/UL (ref 0–0.7)
EOSINOPHIL NFR BLD AUTO: 6 %
ERYTHROCYTE [DISTWIDTH] IN BLOOD BY AUTOMATED COUNT: 12.9 % (ref 10–15)
GLUCOSE SERPL-MCNC: 113 MG/DL (ref 70–99)
HCT VFR BLD AUTO: 42.7 % (ref 40–53)
HGB BLD-MCNC: 15.1 G/DL (ref 13.3–17.7)
IMM GRANULOCYTES # BLD: 0 10E3/UL
IMM GRANULOCYTES NFR BLD: 0 %
LIPASE SERPL-CCNC: 66 U/L (ref 13–60)
LYMPHOCYTES # BLD AUTO: 2.5 10E3/UL (ref 0.8–5.3)
LYMPHOCYTES NFR BLD AUTO: 26 %
MAGNESIUM SERPL-MCNC: 1.9 MG/DL (ref 1.7–2.3)
MCH RBC QN AUTO: 31.2 PG (ref 26.5–33)
MCHC RBC AUTO-ENTMCNC: 35.4 G/DL (ref 31.5–36.5)
MCV RBC AUTO: 88 FL (ref 78–100)
MONOCYTES # BLD AUTO: 0.6 10E3/UL (ref 0–1.3)
MONOCYTES NFR BLD AUTO: 6 %
NEUTROPHILS # BLD AUTO: 6.1 10E3/UL (ref 1.6–8.3)
NEUTROPHILS NFR BLD AUTO: 61 %
NRBC # BLD AUTO: 0 10E3/UL
NRBC BLD AUTO-RTO: 0 /100
PLATELET # BLD AUTO: 195 10E3/UL (ref 150–450)
POTASSIUM SERPL-SCNC: 3.7 MMOL/L (ref 3.4–5.3)
PROT SERPL-MCNC: 6.7 G/DL (ref 6.4–8.3)
RBC # BLD AUTO: 4.84 10E6/UL (ref 4.4–5.9)
SODIUM SERPL-SCNC: 144 MMOL/L (ref 135–145)
WBC # BLD AUTO: 9.9 10E3/UL (ref 4–11)

## 2023-12-21 PROCEDURE — 81003 URINALYSIS AUTO W/O SCOPE: CPT | Performed by: STUDENT IN AN ORGANIZED HEALTH CARE EDUCATION/TRAINING PROGRAM

## 2023-12-21 PROCEDURE — 85014 HEMATOCRIT: CPT

## 2023-12-21 PROCEDURE — 83735 ASSAY OF MAGNESIUM: CPT | Performed by: STUDENT IN AN ORGANIZED HEALTH CARE EDUCATION/TRAINING PROGRAM

## 2023-12-21 PROCEDURE — 82550 ASSAY OF CK (CPK): CPT

## 2023-12-21 PROCEDURE — 82435 ASSAY OF BLOOD CHLORIDE: CPT

## 2023-12-21 PROCEDURE — 82150 ASSAY OF AMYLASE: CPT

## 2023-12-21 PROCEDURE — 82374 ASSAY BLOOD CARBON DIOXIDE: CPT

## 2023-12-21 PROCEDURE — 83690 ASSAY OF LIPASE: CPT

## 2023-12-21 PROCEDURE — 36415 COLL VENOUS BLD VENIPUNCTURE: CPT

## 2023-12-21 NOTE — PATIENT INSTRUCTIONS
-Check blood pressure at home.   -IF top number (systolic) is 200 or above, or IF bottom number (diastolic) is 110 or above, GO TO ER FOR EVALUATION  -Increase Propanolol to 80mg in the am, 80m in the evening  -will reschedule todays appt to be next week with lab and JULIO appt prior to infusion.       [facility] Triage and after hours / weekends / holidays:  [phone]    Please call the triage or after hours line if you experience a temperature greater than or equal to 100.4, shaking chills, have uncontrolled nausea, vomiting and/or diarrhea, dizziness, shortness of breath, chest pain, bleeding, unexplained bruising, or if you have any other new/concerning symptoms, questions or concerns.      If you are having any concerning symptoms or wish to speak to a provider before your next infusion visit, please call triage to notify them so we can adequately serve you.     If you need a refill on a narcotic prescription or other medication, please call before your infusion appointment.                 December 2023 Sunday Monday Tuesday Wednesday Thursday Friday Saturday                            1     2       3     4     5    PE EYE/TH ONCOLOGY   1:30 PM   (45 min.)   [id number]   [facility] 6    RETURN CCSL   2:45 PM   (30 min.)   Bassam Persaud MD   [facility] 7     8     9       10     11     12    THORACIC TUMOR CONFERENCE  12:00 PM   (5 min.)   THORACIC TUMOR CONFERENCE   [facility] Virtual Scheduling 13     14    NEW ONCOLOGY   4:15 PM   (60 min.)   Delroy Fu MD   [facility] [place] 15     16       17     18     19    RETURN RADIATION ONCOLOGY   3:45 PM   (40 min.)   Ajith Brewer MD   [facility] [place] 20     21    LAB PERIPHERAL   3:15 PM   (15 min.)   [facility]   [facility]    ONC INFUSION 1 HR (60 MIN)   3:30  PM   (60 min.)   [facility] ON[facility] INFUSION NURSE   [facility] 22     23       24     25     26     27     28     29     30       31                                               January 2024 Sunday Monday Tuesday Wednesday Thursday Friday Saturday        1     2     3     4     5     6       7     8     9     10    LAB PERIPHERAL   7:45 AM   (15 min.)    [facility]   [facility]    RETURN ACTIVE TREATMENT   8:00 AM   (45 min.)   Chelsea Villegas CNP   [facility]    ONC INFUSION 1 HR (60 MIN)   9:00 AM   (60 min.)   [facility] INFUSION NURSE   [facility] 11     12     13       14     15     16     17     18     19     20       21     22     23     24     25     26     27       28     29    MR BRAIN WWO  12:15 PM   (45 min.)   [id number]   [facility] 30    LAB PERIPHERAL   8:30 AM   (15 min.)    [facility]   [facility]    RETURN ACTIVE TREATMENT   8:45 AM   (45 min.)   Chelsea Villegas CNP   [facility]    ONC INFUSION 1 HR (60 MIN)  10:00 AM   (60 min.)    ONC INFUSION NURSE   [facility] 31                                    Lab Results:  Recent Results (from the past 12 hour(s))   Magnesium    Collection Time: 12/21/23  3:50 PM   Result Value Ref Range    Magnesium 1.9 1.7 - 2.3 mg/dL   Protein qualitative urine    Collection Time: 12/21/23  3:50 PM   Result Value Ref Range    Protein Albumin Urine 30 (A) Negative mg/dL   Comprehensive metabolic panel    Collection Time: 12/21/23  3:50 PM   Result Value Ref Range    Sodium 144 135 - 145 mmol/L    Potassium 3.7 3.4 - 5.3 mmol/L    Carbon Dioxide (CO2) 29 22 - 29 mmol/L    Anion Gap 9 7 - 15 mmol/L    Urea Nitrogen 13.3 6.0 - 20.0 mg/dL    Creatinine 0.61 (L) 0.67 - 1.17 mg/dL    GFR Estimate >90 >60 mL/min/1.73m2     Calcium 9.1 8.6 - 10.0 mg/dL    Chloride 106 98 - 107 mmol/L    Glucose 113 (H) 70 - 99 mg/dL    Alkaline Phosphatase 78 40 - 150 U/L    AST 20 0 - 45 U/L    ALT 12 0 - 70 U/L    Protein Total 6.7 6.4 - 8.3 g/dL    Albumin 4.4 3.5 - 5.2 g/dL    Bilirubin Total 0.5 <=1.2 mg/dL   CK total    Collection Time: 12/21/23  3:50 PM   Result Value Ref Range     39 - 308 U/L   Amylase    Collection Time: 12/21/23  3:50 PM   Result Value Ref Range    Amylase 83 28 - 100 U/L   Lipase    Collection Time: 12/21/23  3:50 PM   Result Value Ref Range    Lipase 66 (H) 13 - 60 U/L   CBC with platelets and differential    Collection Time: 12/21/23  3:50 PM   Result Value Ref Range    WBC Count 9.9 4.0 - 11.0 10e3/uL    RBC Count 4.84 4.40 - 5.90 10e6/uL    Hemoglobin 15.1 13.3 - 17.7 g/dL    Hematocrit 42.7 40.0 - 53.0 %    MCV 88 78 - 100 fL    MCH 31.2 26.5 - 33.0 pg    MCHC 35.4 31.5 - 36.5 g/dL    RDW 12.9 10.0 - 15.0 %    Platelet Count 195 150 - 450 10e3/uL    % Neutrophils 61 %    % Lymphocytes 26 %    % Monocytes 6 %    % Eosinophils 6 %    % Basophils 1 %    % Immature Granulocytes 0 %    NRBCs per 100 WBC 0 <1 /100    Absolute Neutrophils 6.1 1.6 - 8.3 10e3/uL    Absolute Lymphocytes 2.5 0.8 - 5.3 10e3/uL    Absolute Monocytes 0.6 0.0 - 1.3 10e3/uL    Absolute Eosinophils 0.6 0.0 - 0.7 10e3/uL    Absolute Basophils 0.1 0.0 - 0.2 10e3/uL    Absolute Immature Granulocytes 0.0 <=0.4 10e3/uL    Absolute NRBCs 0.0 10e3/uL

## 2023-12-21 NOTE — PROGRESS NOTES
Infusion Nursing Note:  Connor Emerson presents today for Day 1 Cycle 7 Bevacuzimab (deferred for hypertension) .    Patient seen by provider today: No   present during visit today: Not Applicable.    Note: Patient presents to infusion feeling ok. Patient denies acute discomfort and states no acute complaints or concerns needing to be addressed today. Specifically, pt denies s/s of infection such as fever, sore throat, cough, chest pain, shortness of breath, body aches, chills, headache, increased nasal congestion, or changes in taste/smell. 's systolic. Pt asymptomatic. Pt confirms he is taking BP meds per home med list. Pt states he has NOT been checking blood pressure at home, but does note his blood pressure was quite elevated to same degree at another appt last Friday. Patient denies any severe headaches or neurological changes associated with elevated bp.    TORB. 12/21/2023. 1626. Dr. Persaud. Josse Calderon RN.  -Defer treatment today. Reschedule for next week and have JULIO see prior to  -Increase Propanolol to 80mg in the am, 80m in the evening  -Check BP at home. Go to ER if systolic is 200 or greater, diastolic is 110 or above even if no symptoms    Pt voices understanding of the above orders. IB sent to scheduling to reschedule appts from today    Intravenous Access:  Peripheral IV placed.    Treatment Conditions:  Lab Results   Component Value Date    HGB 15.1 12/21/2023    WBC 9.9 12/21/2023    ANEU 7.8 08/20/2016    ANEUTAUTO 6.1 12/21/2023     12/21/2023        Lab Results   Component Value Date     12/21/2023    POTASSIUM 3.7 12/21/2023    MAG 1.9 12/21/2023    CR 0.61 (L) 12/21/2023    TORY 9.1 12/21/2023    BILITOTAL 0.5 12/21/2023    ALBUMIN 4.4 12/21/2023    ALT 12 12/21/2023    AST 20 12/21/2023         Post Infusion Assessment:  Access discontinued per protocol.       Discharge Plan:   Patient declined prescription refills.  Discharge instructions reviewed with:  Patient.  Patient and/or family verbalized understanding of discharge instructions and all questions answered.  Copy of AVS reviewed with patient and/or family.  Patient will return next week for next appointment.  Patient discharged in stable condition accompanied by: self.  Departure Mode: Ambulatory.      Josse Calderon RN

## 2023-12-22 ENCOUNTER — VIRTUAL VISIT (OUTPATIENT)
Dept: ONCOLOGY | Facility: CLINIC | Age: 43
End: 2023-12-22
Attending: PHYSICIAN ASSISTANT
Payer: COMMERCIAL

## 2023-12-22 VITALS — BODY MASS INDEX: 31.7 KG/M2 | WEIGHT: 214 LBS | HEIGHT: 69 IN

## 2023-12-22 DIAGNOSIS — I67.89 RADIATION THERAPY INDUCED BRAIN NECROSIS: ICD-10-CM

## 2023-12-22 DIAGNOSIS — I10 PRIMARY HYPERTENSION: Primary | ICD-10-CM
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
## 2023-12-22 DIAGNOSIS — C34.31 MALIGNANT NEOPLASM OF LOWER LOBE OF RIGHT LUNG (H): ICD-10-CM

## 2023-12-22 DIAGNOSIS — N30.01 ACUTE CYSTITIS WITH HEMATURIA: ICD-10-CM

## 2023-12-22 PROCEDURE — 99215 OFFICE O/P EST HI 40 MIN: CPT | Mod: VID | Performed by: PHYSICIAN ASSISTANT

## 2023-12-22 RX ORDER — DIPHENHYDRAMINE HYDROCHLORIDE 50 MG/ML
50 INJECTION INTRAMUSCULAR; INTRAVENOUS
Status: CANCELLED
Start: 2023-12-26

## 2023-12-22 RX ORDER — LORAZEPAM 2 MG/ML
0.5 INJECTION INTRAMUSCULAR EVERY 4 HOURS PRN
Status: CANCELLED | OUTPATIENT
Start: 2023-12-26

## 2023-12-22 RX ORDER — HEPARIN SODIUM,PORCINE 10 UNIT/ML
5-20 VIAL (ML) INTRAVENOUS DAILY PRN
Status: CANCELLED | OUTPATIENT
Start: 2023-12-26

## 2023-12-22 RX ORDER — MEPERIDINE HYDROCHLORIDE 25 MG/ML
25 INJECTION INTRAMUSCULAR; INTRAVENOUS; SUBCUTANEOUS EVERY 30 MIN PRN
Status: CANCELLED | OUTPATIENT
Start: 2023-12-26

## 2023-12-22 RX ORDER — HEPARIN SODIUM (PORCINE) LOCK FLUSH IV SOLN 100 UNIT/ML 100 UNIT/ML
5 SOLUTION INTRAVENOUS
Status: CANCELLED | OUTPATIENT
Start: 2023-12-26

## 2023-12-22 RX ORDER — METHYLPREDNISOLONE SODIUM SUCCINATE 125 MG/2ML
125 INJECTION, POWDER, LYOPHILIZED, FOR SOLUTION INTRAMUSCULAR; INTRAVENOUS
Status: CANCELLED
Start: 2023-12-26

## 2023-12-22 RX ORDER — HYDRALAZINE HYDROCHLORIDE 25 MG/1
TABLET, FILM COATED ORAL
Qty: 90 TABLET | Refills: 0 | Status: SHIPPED | OUTPATIENT
Start: 2023-12-22 | End: 2023-12-27

## 2023-12-22 RX ORDER — ALBUTEROL SULFATE 0.83 MG/ML
2.5 SOLUTION RESPIRATORY (INHALATION)
Status: CANCELLED | OUTPATIENT
Start: 2023-12-26

## 2023-12-22 RX ORDER — ALBUTEROL SULFATE 90 UG/1
1-2 AEROSOL, METERED RESPIRATORY (INHALATION)
Status: CANCELLED
Start: 2023-12-26

## 2023-12-22 RX ORDER — EPINEPHRINE 1 MG/ML
0.3 INJECTION, SOLUTION INTRAMUSCULAR; SUBCUTANEOUS EVERY 5 MIN PRN
Status: CANCELLED | OUTPATIENT
Start: 2023-12-26

## 2023-12-22 ASSESSMENT — PAIN SCALES - GENERAL: PAINLEVEL: NO PAIN (0)

## 2023-12-22 NOTE — NURSING NOTE
Is the patient currently in the state of [place]? YES    Visit mode:VIDEO    If the visit is dropped, the patient can be reconnected by: VIDEO VISIT: Text to cell phone:   Telephone Information:   Mobile [phone]       Will anyone else be joining the visit? NO  (If patient encounters technical issues they should call [phone])    How would you like to obtain your AVS? MyChart    Are changes needed to the allergy or medication list? No    Reason for visit: RECHECK  Bobbilynn Grossaint VVF

## 2023-12-22 NOTE — PROGRESS NOTES
"Virtual Visit Details    Type of service:  Video Visit   Video Start Time:  2:10  Video End Time: 2:27    Originating Location (pt. 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 He is currently undergoing treatment with brigatinib and bevacizumab. Please see previous note by Dr. Persaud for further details on the patient's history.     Connor recently saw Dr persaud and they discussed Avastin infusions routinely until his next set of images.      Interval History:  -he has been working more, getting more responsibilities at work, has been able to stay busy and feels good about this  -no further UTI/bleeding issues  - started the cymbalta, this has helped with the muscle/joint pain. He could tell a difference that his pain was less from this.  We did increase this to twice daily however he has not seen any further improvement.  Feels the Avastin makes this worse for a week after  -Connor has not checked his BP.  Is taking the propanolol 80 mg BID that he was instructed this week when his BP was too high.  Has had intermittent headaches  -He follows with palliative for pain control.  Taking methadone 1 in the AM and 1 PM, oxycodone 2 at lunch and 2 bedtime.  He cut out the morning oxycodone to see if he would feel less tired.  He uses celebrex in the AM.   -No speech changes, vision changes, paresthesias, focal weakness, leg swelling, or activity concerning for seizures.  -No chest pain or SOB.   -No cough or fever.  -Having regular BM;s, last this AM. No abdominal pain.      Physical Examination:  Ht 1.753 m (5' 9\")   Wt 97.1 kg (214 lb)   BMI 31.60 kg/m    Wt Readings from " Last 4 Encounters:   12/22/23 97.1 kg (214 lb)   12/21/23 98.4 kg (217 lb)   12/19/23 97.5 kg (215 lb)   12/14/23 97.7 kg (215 lb 6.4 oz)     Video physical exam  General: Patient appears well in no acute distress.   Skin: No visualized rash or lesions on visualized skin  Eyes: EOMI, no erythema, sclera icterus or discharge noted  Resp: Appears to be breathing comfortably without accessory muscle usage, speaking in full sentences, no cough  MSK: Appears to have normal range of motion based on visualized movements  Neurologic: No apparent tremors, facial movements symmetric  Psych: affect bright, alert and oriented    Laboratory Data:  Most Recent 3 CBC's:  Recent Labs   Lab Test 12/21/23  1550 11/10/23  1309 11/06/23  1256   WBC 9.9 6.5 11.3*   HGB 15.1 13.4 13.7   MCV 88 95 94    171 205   ANEUTAUTO 6.1 3.7 6.8    Most Recent 3 BMP's:  Recent Labs   Lab Test 12/21/23  1550 11/17/23  1325 11/10/23  1309    145 143   POTASSIUM 3.7 4.5  4.5 3.6   CHLORIDE 106 107 106   CO2 29 25 28   BUN 13.3 16.9 17.4   CR 0.61* 0.75 0.67   ANIONGAP 9 13 9   TORY 9.1 9.3 8.9   * 86 177*   PROTTOTAL 6.7 6.9 6.3*   ALBUMIN 4.4 4.3 4.3    Most Recent 2 LFT's:  Recent Labs   Lab Test 12/21/23  1550 11/17/23  1325   AST 20 34   ALT 12 12   ALKPHOS 78 75   BILITOTAL 0.5 0.3    Most Recent TSH and T4:  Recent Labs   Lab Test 09/12/22  1642   TSH 2.56   Reviewed lipase, amylase, CK, and magnesium. Lipase elevated slightly.   I reviewed the above labs today.    Imaging:  PET Oncology (Eyes to Thighs)  Narrative: EXAM: PET ONCOLOGY (EYES TO THIGHS), CT CHEST/ABDOMEN/PELVIS W CONTRAST  LOCATION: [facility]  DATE: 12/5/2023    INDICATION: Subsequent treatment planning and restaging for malignant neoplasm of lower lobe, right bronchus or lung. Metastatic non-small cell lung cancer. Right frontal craniotomy for resection of lesion 06/27/2023 with pathology showing radiation   necrosis. On  systemic/targeted maintenance therapy. Patient presents for restaging to evaluate for residual FDG avid disease.  COMPARISON: CT chest abdomen pelvis 09/12/2023  CONTRAST: 119 mL Isovue-370.  TECHNIQUE: Serum glucose level 64 mg/dL. One hour post intravenous administration of 12.37 mCi F-18 FDG, PET imaging was performed from the skull vertex to mid thighs, utilizing attenuation correction with concurrent axial CT and PET/CT image fusion.   Separate diagnostic CT of the chest, abdomen, and pelvis was performed. Dose reduction techniques were used.    PET/CT FINDINGS: Mild FDG uptake associated with a stable right lower lobe nodular opacity measuring 1.6 x 1.4 cm with SUV max of 4.5 (series 7, image 70). No additional sites of FDG avid disease.    Refer to brain MRI for description of intracranial findings. Photopenia of the right frontal lobe associated with post surgical change.    Esophagitis with inflammatory uptake. Mild inflammatory uptake associated with injection granulomas of the subcutaneous fat of the ventral abdominal wall. Mild uptake within bilateral submandibular lymph nodes, favored reactive.    CT FINDINGS: No significant coronary artery calcifications. Bilateral renal cysts. Small fat-containing umbilical hernia. Mild degenerative changes of the spine.  Impression: IMPRESSION:    Mild FDG uptake associated with a stable nodular opacity in the right lower lobe which could represent a site of residual disease versus posttreatment inflammation. No additional site of FDG avid disease.  CT Chest/Abdomen/Pelvis w Contrast  Narrative: EXAM: PET ONCOLOGY (EYES TO THIGHS), CT CHEST/ABDOMEN/PELVIS W CONTRAST  LOCATION: [facility]  DATE: 12/5/2023    INDICATION: Subsequent treatment planning and restaging for malignant neoplasm of lower lobe, right bronchus or lung. Metastatic non-small cell lung cancer. Right frontal craniotomy for resection of lesion 06/27/2023 with pathology showing  radiation   necrosis. On systemic/targeted maintenance therapy. Patient presents for restaging to evaluate for residual FDG avid disease.  COMPARISON: CT chest abdomen pelvis 09/12/2023  CONTRAST: 119 mL Isovue-370.  TECHNIQUE: Serum glucose level 64 mg/dL. One hour post intravenous administration of 12.37 mCi F-18 FDG, PET imaging was performed from the skull vertex to mid thighs, utilizing attenuation correction with concurrent axial CT and PET/CT image fusion.   Separate diagnostic CT of the chest, abdomen, and pelvis was performed. Dose reduction techniques were used.    PET/CT FINDINGS: Mild FDG uptake associated with a stable right lower lobe nodular opacity measuring 1.6 x 1.4 cm with SUV max of 4.5 (series 7, image 70). No additional sites of FDG avid disease.    Refer to brain MRI for description of intracranial findings. Photopenia of the right frontal lobe associated with post surgical change.    Esophagitis with inflammatory uptake. Mild inflammatory uptake associated with injection granulomas of the subcutaneous fat of the ventral abdominal wall. Mild uptake within bilateral submandibular lymph nodes, favored reactive.    CT FINDINGS: No significant coronary artery calcifications. Bilateral renal cysts. Small fat-containing umbilical hernia. Mild degenerative changes of the spine.  Impression: IMPRESSION:    Mild FDG uptake associated with a stable nodular opacity in the right lower lobe which could represent a site of residual disease versus posttreatment inflammation. No additional site of FDG avid disease.    I reviewed the above imaging today.    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   -lipase 283 in October, grade 3 elevation. No symptoms of abdominal pain, N/V.  We did hold this  for 1 week and it returned to normal.  He resumed the same dose of 120 mg daily.  - Dec PET shows some uptake in the lung, saw Dr Fu, not recommended biopsy.  Monitor  - urine protein elevated.  Will hold off on 24 hour urine unless >100. Plan is to stay on Avastin until next images    NEURO  # Brain mets: reviewed brain MRI today, stable  # Radiation necrosis  -Baseline Brain MRI with several brain mets, s/p GK to 11-12 brain mets. F/u Brain MRI in June 2022 was showing enlargement of the one of the lesions along with edema, therefore had to undergo craniotomy followed by resection, the final biopsy consistent with radiation necrosis.   -currently on bevacizumab for radiation necrosis --15 mg/kg every 3 weeks.    (Of note, Connor S/p 2 doses of Bevacixumab fall of 2022, stopped due HTN urgency and PE.) MRI in 4/2023 now showing contrast enhancement of lesions and a dominate lesion in the Rfrontal region with edema, several other smaller lesion. Short term MRI showing increase in size of the rt frontal lesion, underwent resection, patho again showing radiation necrosis.  Reviewed this is improved on October imaging  - continue propanolol to 80 BID,  50 mg of hydrochlorothiazide and lisinopril 40 mg daily.  Will also start hydralazine.   -Check BP twice daily. Educated on risk of stroke due to uncontrolled BP and warning signs of this that warrant immediate care.   # Headache  -Present since recent craniotomy on 6/27/23. MRI brain on 7/27/23 with reduced vasogenic edema. Small dose of Dex + taper in August, these have resolved.  Recently they have occurred again, need to monitor these      Elevated Lipase  -was 66 this week, ok     Diabetes, Type 2  Reports improved control. Currently running about 150-170 at home  --on Metformin and insulin    PE: provoked by malignancy vs bevacizumab in 11/2022  -- on rivaroxiabn 20 mg daily      Grade 2-3 arthralgias  All joints affected, no morning stiffness, normal ESR and  CRP  Recently has noticed more fatigue and stifnnes in body.  2/2 to brigatinib and possible worsening with Avastin.   -following with palliative;   -on methadone BID;Celecoxib 100 mg po BID  -I switched him from Celexa to Cymbalta.  This initially helped his arthralgias.  We increased to twice daily.  Unfortunately he was unable to notice much difference.   -Continues on oxycodone 2-3 times daily    Hematuria: repeat UA showed no red cells and no infection     30 minutes spent on the date of the encounter doing chart review, review of test results, interpretation of tests, patient visit, and documentation  and discussion of plan with Dr Cori Navarro PA-C  [facility]  [address] [place] [id number] [phone]

## 2023-12-22 NOTE — Clinical Note
12/22/2023         RE: Connor Emerson  [address]  [facility] [address]        Dear Colleague,    Thank you for referring your patient, Connor Emerson, to the [facility]. Please see a copy of my visit note below.    Virtual Visit Details    Type of service:  Video Visit   Video Start Time:  2:10  Video End Time: 2:27    Originating Location (pt. 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 He is currently undergoing treatment with brigatinib and bevacizumab. Please see previous note by Dr. Persaud for further details on the patient's history.     Connor recently saw Dr persaud and they discussed Avastin infusions routinely until his next set of images.      Interval History:  -he has been working more, getting more responsibilities at work, has been able to stay busy and feels good about this  -no further UTI/bleeding issues  - started the cymbalta, this has helped with the muscle/joint pain. He could tell a difference that his pain was less from this.  We did increase this to twice daily however he has not seen any further improvement.  Feels the Avastin makes this worse for a week after  -Connor has not checked his BP.  Is taking the propanolol 80 mg BID that he was instructed this week when his BP was too high.  Has had intermittent headaches  -He follows with palliative for pain control.  Taking methadone 1 in the AM and 1 PM, oxycodone 2 at lunch and 2 bedtime.  He cut out the morning oxycodone to see if he would feel less tired.  He uses celebrex in the AM.   -No speech changes, vision changes, paresthesias, focal weakness,  "leg swelling, or activity concerning for seizures.  -No chest pain or SOB.   -No cough or fever.  -Having regular BM;s, last this AM. No abdominal pain.      Physical Examination:  Ht 1.753 m (5' 9\")   Wt 97.1 kg (214 lb)   BMI 31.60 kg/m    Wt Readings from Last 4 Encounters:   12/22/23 97.1 kg (214 lb)   12/21/23 98.4 kg (217 lb)   12/19/23 97.5 kg (215 lb)   12/14/23 97.7 kg (215 lb 6.4 oz)     Video physical exam  General: Patient appears well in no acute distress.   Skin: No visualized rash or lesions on visualized skin  Eyes: EOMI, no erythema, sclera icterus or discharge noted  Resp: Appears to be breathing comfortably without accessory muscle usage, speaking in full sentences, no cough  MSK: Appears to have normal range of motion based on visualized movements  Neurologic: No apparent tremors, facial movements symmetric  Psych: affect bright, alert and oriented    Laboratory Data:  Most Recent 3 CBC's:  Recent Labs   Lab Test 12/21/23  1550 11/10/23  1309 11/06/23  1256   WBC 9.9 6.5 11.3*   HGB 15.1 13.4 13.7   MCV 88 95 94    171 205   ANEUTAUTO 6.1 3.7 6.8    Most Recent 3 BMP's:  Recent Labs   Lab Test 12/21/23  1550 11/17/23  1325 11/10/23  1309    145 143   POTASSIUM 3.7 4.5  4.5 3.6   CHLORIDE 106 107 106   CO2 29 25 28   BUN 13.3 16.9 17.4   CR 0.61* 0.75 0.67   ANIONGAP 9 13 9   TORY 9.1 9.3 8.9   * 86 177*   PROTTOTAL 6.7 6.9 6.3*   ALBUMIN 4.4 4.3 4.3    Most Recent 2 LFT's:  Recent Labs   Lab Test 12/21/23  1550 11/17/23  1325   AST 20 34   ALT 12 12   ALKPHOS 78 75   BILITOTAL 0.5 0.3    Most Recent TSH and T4:  Recent Labs   Lab Test 09/12/22  1642   TSH 2.56   Reviewed lipase, amylase, CK, and magnesium. Lipase elevated slightly.   I reviewed the above labs today.    Imaging:  PET Oncology (Eyes to Thighs)  Narrative: EXAM: PET ONCOLOGY (EYES TO THIGHS), CT CHEST/ABDOMEN/PELVIS W CONTRAST  LOCATION: [facility]  DATE: 12/5/2023    INDICATION: " Subsequent treatment planning and restaging for malignant neoplasm of lower lobe, right bronchus or lung. Metastatic non-small cell lung cancer. Right frontal craniotomy for resection of lesion 06/27/2023 with pathology showing radiation   necrosis. On systemic/targeted maintenance therapy. Patient presents for restaging to evaluate for residual FDG avid disease.  COMPARISON: CT chest abdomen pelvis 09/12/2023  CONTRAST: 119 mL Isovue-370.  TECHNIQUE: Serum glucose level 64 mg/dL. One hour post intravenous administration of 12.37 mCi F-18 FDG, PET imaging was performed from the skull vertex to mid thighs, utilizing attenuation correction with concurrent axial CT and PET/CT image fusion.   Separate diagnostic CT of the chest, abdomen, and pelvis was performed. Dose reduction techniques were used.    PET/CT FINDINGS: Mild FDG uptake associated with a stable right lower lobe nodular opacity measuring 1.6 x 1.4 cm with SUV max of 4.5 (series 7, image 70). No additional sites of FDG avid disease.    Refer to brain MRI for description of intracranial findings. Photopenia of the right frontal lobe associated with post surgical change.    Esophagitis with inflammatory uptake. Mild inflammatory uptake associated with injection granulomas of the subcutaneous fat of the ventral abdominal wall. Mild uptake within bilateral submandibular lymph nodes, favored reactive.    CT FINDINGS: No significant coronary artery calcifications. Bilateral renal cysts. Small fat-containing umbilical hernia. Mild degenerative changes of the spine.  Impression: IMPRESSION:    Mild FDG uptake associated with a stable nodular opacity in the right lower lobe which could represent a site of residual disease versus posttreatment inflammation. No additional site of FDG avid disease.  CT Chest/Abdomen/Pelvis w Contrast  Narrative: EXAM: PET ONCOLOGY (EYES TO THIGHS), CT CHEST/ABDOMEN/PELVIS W CONTRAST  LOCATION: [facility]  DATE:  12/5/2023    INDICATION: Subsequent treatment planning and restaging for malignant neoplasm of lower lobe, right bronchus or lung. Metastatic non-small cell lung cancer. Right frontal craniotomy for resection of lesion 06/27/2023 with pathology showing radiation   necrosis. On systemic/targeted maintenance therapy. Patient presents for restaging to evaluate for residual FDG avid disease.  COMPARISON: CT chest abdomen pelvis 09/12/2023  CONTRAST: 119 mL Isovue-370.  TECHNIQUE: Serum glucose level 64 mg/dL. One hour post intravenous administration of 12.37 mCi F-18 FDG, PET imaging was performed from the skull vertex to mid thighs, utilizing attenuation correction with concurrent axial CT and PET/CT image fusion.   Separate diagnostic CT of the chest, abdomen, and pelvis was performed. Dose reduction techniques were used.    PET/CT FINDINGS: Mild FDG uptake associated with a stable right lower lobe nodular opacity measuring 1.6 x 1.4 cm with SUV max of 4.5 (series 7, image 70). No additional sites of FDG avid disease.    Refer to brain MRI for description of intracranial findings. Photopenia of the right frontal lobe associated with post surgical change.    Esophagitis with inflammatory uptake. Mild inflammatory uptake associated with injection granulomas of the subcutaneous fat of the ventral abdominal wall. Mild uptake within bilateral submandibular lymph nodes, favored reactive.    CT FINDINGS: No significant coronary artery calcifications. Bilateral renal cysts. Small fat-containing umbilical hernia. Mild degenerative changes of the spine.  Impression: IMPRESSION:    Mild FDG uptake associated with a stable nodular opacity in the right lower lobe which could represent a site of residual disease versus posttreatment inflammation. No additional site of FDG avid disease.    I reviewed the above imaging today.    Assessment and Plan:    Stage IV NSCLC, Rt lung adenocarcinoma with metastasis to pleura, mediastinum , rt  pleural effusion and brain   -brigatinib 2/22/23, multiple different doses, currently at 120 mg, most recent reduction (July of 2023) due to myalgias.   -lipase 283 in October, grade 3 elevation. No symptoms of abdominal pain, N/V.  We did hold this for 1 week and it returned to normal.  He resumed the same dose of 120 mg daily.  - Dec PET shows some uptake in the lung, saw Dr Fu, not recommended biopsy.  Monitor  - urine protein elevated.  Will hold off on 24 hour urine unless >100. Plan is to stay on Avastin until next images    NEURO  # Brain mets: reviewed brain MRI today, stable  # Radiation necrosis  -Baseline Brain MRI with several brain mets, s/p GK to 11-12 brain mets. F/u Brain MRI in June 2022 was showing enlargement of the one of the lesions along with edema, therefore had to undergo craniotomy followed by resection, the final biopsy consistent with radiation necrosis.   -currently on bevacizumab for radiation necrosis --15 mg/kg every 3 weeks.    (Of note, Connor S/p 2 doses of Bevacixumab fall of 2022, stopped due HTN urgency and PE.) MRI in 4/2023 now showing contrast enhancement of lesions and a dominate lesion in the Rfrontal region with edema, several other smaller lesion. Short term MRI showing increase in size of the rt frontal lesion, underwent resection, patho again showing radiation necrosis.  Reviewed this is improved on October imaging  - continue propanolol to 80 BID,  50 mg of hydrochlorothiazide and lisinopril 40 mg daily.  Will also start hydralazine.   -Check BP twice daily. Educated on risk of stroke due to uncontrolled BP and warning signs of this that warrant immediate care.   # Headache  -Present since recent craniotomy on 6/27/23. MRI brain on 7/27/23 with reduced vasogenic edema. Small dose of Dex + taper in August, these have resolved.  Recently they have occurred again, need to monitor these      Elevated Lipase  -was 66 this week, ok     Diabetes, Type 2  Reports improved  control. Currently running about 150-170 at home  --on Metformin and insulin    PE: provoked by malignancy vs bevacizumab in 11/2022  -- on rivaroxiabn 20 mg daily      Grade 2-3 arthralgias  All joints affected, no morning stiffness, normal ESR and CRP  Recently has noticed more fatigue and stifnnes in body.  2/2 to brigatinib and possible worsening with Avastin.   -following with palliative;   -on methadone BID;Celecoxib 100 mg po BID  -I switched him from Celexa to Cymbalta.  This initially helped his arthralgias.  We increased to twice daily.  Unfortunately he was unable to notice much difference.   -Continues on oxycodone 2-3 times daily    Hematuria: repeat UA showed no red cells and no infection     30 minutes spent on the date of the encounter doing chart review, review of test results, interpretation of tests, patient visit, and documentation  and discussion of plan with Dr Cori Navarro PA-C  [facility] [address] [id number] [phone]           Again, thank you for allowing me to participate in the care of your patient.        Sincerely,        Sarina Navarro PA-C

## 2023-12-27 ENCOUNTER — APPOINTMENT (OUTPATIENT)
Dept: GENERAL RADIOLOGY | Facility: CLINIC | Age: 43
End: 2023-12-27
Attending: EMERGENCY MEDICINE
Payer: COMMERCIAL

## 2023-12-27 ENCOUNTER — APPOINTMENT (OUTPATIENT)
Dept: CT IMAGING | Facility: CLINIC | Age: 43
End: 2023-12-27
Attending: EMERGENCY MEDICINE
Payer: COMMERCIAL

## 2023-12-27 ENCOUNTER — LAB (OUTPATIENT)
Dept: LAB | Facility: CLINIC | Age: 43
End: 2023-12-27
Attending: STUDENT IN AN ORGANIZED HEALTH CARE EDUCATION/TRAINING PROGRAM
Payer: COMMERCIAL

## 2023-12-27 ENCOUNTER — HOSPITAL ENCOUNTER (INPATIENT)
Facility: CLINIC | Age: 43
LOS: 1 days | Discharge: HOME | End: 2023-12-28
Attending: EMERGENCY MEDICINE | Admitting: STUDENT IN AN ORGANIZED HEALTH CARE EDUCATION/TRAINING PROGRAM
Payer: COMMERCIAL

## 2023-12-27 ENCOUNTER — DOCUMENTATION ONLY (OUTPATIENT)
Dept: ONCOLOGY | Facility: CLINIC | Age: 43
End: 2023-12-27

## 2023-12-27 VITALS
BODY MASS INDEX: 32.36 KG/M2 | TEMPERATURE: 98.4 F | SYSTOLIC BLOOD PRESSURE: 248 MMHG | WEIGHT: 219.1 LBS | OXYGEN SATURATION: 98 % | DIASTOLIC BLOOD PRESSURE: 153 MMHG | RESPIRATION RATE: 18 BRPM | HEART RATE: 74 BPM

## 2023-12-27 DIAGNOSIS — C34.90 PRIMARY MALIGNANT NEOPLASM OF LUNG METASTATIC TO OTHER SITE, UNSPECIFIED LATERALITY (H): ICD-10-CM

## 2023-12-27 DIAGNOSIS — I16.0 HYPERTENSIVE URGENCY: ICD-10-CM

## 2023-12-27 DIAGNOSIS — H35.033 HYPERTENSIVE RETINOPATHY OF BOTH EYES: Primary | ICD-10-CM

## 2023-12-27 DIAGNOSIS — C34.31 MALIGNANT NEOPLASM OF LOWER LOBE OF RIGHT LUNG (H): ICD-10-CM

## 2023-12-27 LAB
ALBUMIN SERPL BCG-MCNC: 4.7 G/DL (ref 3.5–5.2)
ALBUMIN UR-MCNC: 100 MG/DL
ALP SERPL-CCNC: 93 U/L (ref 40–150)
ALT SERPL W P-5'-P-CCNC: 15 U/L (ref 0–70)
ANION GAP SERPL CALCULATED.3IONS-SCNC: 14 MMOL/L (ref 7–15)
APPEARANCE UR: CLEAR
AST SERPL W P-5'-P-CCNC: 20 U/L (ref 0–45)
BASOPHILS # BLD AUTO: 0.1 10E3/UL (ref 0–0.2)
BASOPHILS NFR BLD AUTO: 1 %
BILIRUB SERPL-MCNC: 0.5 MG/DL
BILIRUB UR QL STRIP: NEGATIVE
BUN SERPL-MCNC: 13.6 MG/DL (ref 6–20)
CALCIUM SERPL-MCNC: 10.1 MG/DL (ref 8.6–10)
CHLORIDE SERPL-SCNC: 104 MMOL/L (ref 98–107)
COLOR UR AUTO: ABNORMAL
CREAT SERPL-MCNC: 0.61 MG/DL (ref 0.67–1.17)
DEPRECATED HCO3 PLAS-SCNC: 25 MMOL/L (ref 22–29)
EGFRCR SERPLBLD CKD-EPI 2021: >90 ML/MIN/1.73M2
EOSINOPHIL # BLD AUTO: 0.6 10E3/UL (ref 0–0.7)
EOSINOPHIL NFR BLD AUTO: 6 %
ERYTHROCYTE [DISTWIDTH] IN BLOOD BY AUTOMATED COUNT: 13.2 % (ref 10–15)
GLUCOSE BLDC GLUCOMTR-MCNC: 130 MG/DL (ref 70–99)
GLUCOSE SERPL-MCNC: 159 MG/DL (ref 70–99)
GLUCOSE UR STRIP-MCNC: NEGATIVE MG/DL
HBA1C MFR BLD: 6.7 %
HCT VFR BLD AUTO: 47 % (ref 40–53)
HGB BLD-MCNC: 16.3 G/DL (ref 13.3–17.7)
HGB UR QL STRIP: NEGATIVE
HYALINE CASTS: 5 /LPF
IMM GRANULOCYTES # BLD: 0 10E3/UL
IMM GRANULOCYTES NFR BLD: 0 %
KETONES UR STRIP-MCNC: NEGATIVE MG/DL
LEUKOCYTE ESTERASE UR QL STRIP: NEGATIVE
LYMPHOCYTES # BLD AUTO: 2.9 10E3/UL (ref 0.8–5.3)
LYMPHOCYTES NFR BLD AUTO: 27 %
MCH RBC QN AUTO: 30.9 PG (ref 26.5–33)
MCHC RBC AUTO-ENTMCNC: 34.7 G/DL (ref 31.5–36.5)
MCV RBC AUTO: 89 FL (ref 78–100)
MONOCYTES # BLD AUTO: 0.9 10E3/UL (ref 0–1.3)
MONOCYTES NFR BLD AUTO: 8 %
MUCOUS THREADS #/AREA URNS LPF: PRESENT /LPF
NEUTROPHILS # BLD AUTO: 6.2 10E3/UL (ref 1.6–8.3)
NEUTROPHILS NFR BLD AUTO: 58 %
NITRATE UR QL: NEGATIVE
NRBC # BLD AUTO: 0 10E3/UL
NRBC BLD AUTO-RTO: 0 /100
PH UR STRIP: 5.5 [PH] (ref 5–7)
PLATELET # BLD AUTO: 231 10E3/UL (ref 150–450)
POTASSIUM SERPL-SCNC: 3.8 MMOL/L (ref 3.4–5.3)
PROT SERPL-MCNC: 7.5 G/DL (ref 6.4–8.3)
RBC # BLD AUTO: 5.28 10E6/UL (ref 4.4–5.9)
RBC URINE: 1 /HPF
SODIUM SERPL-SCNC: 143 MMOL/L (ref 135–145)
SP GR UR STRIP: 1.02 (ref 1–1.03)
SQUAMOUS EPITHELIAL: 2 /HPF
UROBILINOGEN UR STRIP-MCNC: NORMAL MG/DL
WBC # BLD AUTO: 10.7 10E3/UL (ref 4–11)
WBC URINE: 5 /HPF

## 2023-12-27 PROCEDURE — 71046 X-RAY EXAM CHEST 2 VIEWS: CPT | Mod: 26 | Performed by: STUDENT IN AN ORGANIZED HEALTH CARE EDUCATION/TRAINING PROGRAM

## 2023-12-27 PROCEDURE — 120N000002 HC R&B MED SURG/OB UMMC

## 2023-12-27 PROCEDURE — 83036 HEMOGLOBIN GLYCOSYLATED A1C: CPT | Performed by: PHYSICIAN ASSISTANT

## 2023-12-27 PROCEDURE — 96374 THER/PROPH/DIAG INJ IV PUSH: CPT

## 2023-12-27 PROCEDURE — 81001 URINALYSIS AUTO W/SCOPE: CPT | Performed by: EMERGENCY MEDICINE

## 2023-12-27 PROCEDURE — 82570 ASSAY OF URINE CREATININE: CPT | Performed by: STUDENT IN AN ORGANIZED HEALTH CARE EDUCATION/TRAINING PROGRAM

## 2023-12-27 PROCEDURE — 71046 X-RAY EXAM CHEST 2 VIEWS: CPT

## 2023-12-27 PROCEDURE — 85025 COMPLETE CBC W/AUTO DIFF WBC: CPT | Performed by: EMERGENCY MEDICINE

## 2023-12-27 PROCEDURE — 70450 CT HEAD/BRAIN W/O DYE: CPT | Mod: 26 | Performed by: RADIOLOGY

## 2023-12-27 PROCEDURE — 93010 ELECTROCARDIOGRAM REPORT: CPT | Performed by: EMERGENCY MEDICINE

## 2023-12-27 PROCEDURE — G0378 HOSPITAL OBSERVATION PER HR: HCPCS

## 2023-12-27 PROCEDURE — 250N000013 HC RX MED GY IP 250 OP 250 PS 637: Performed by: PHYSICIAN ASSISTANT

## 2023-12-27 PROCEDURE — 80053 COMPREHEN METABOLIC PANEL: CPT | Performed by: EMERGENCY MEDICINE

## 2023-12-27 PROCEDURE — 70450 CT HEAD/BRAIN W/O DYE: CPT

## 2023-12-27 PROCEDURE — 99285 EMERGENCY DEPT VISIT HI MDM: CPT | Mod: 25 | Performed by: EMERGENCY MEDICINE

## 2023-12-27 PROCEDURE — 99285 EMERGENCY DEPT VISIT HI MDM: CPT | Mod: 25

## 2023-12-27 PROCEDURE — 84156 ASSAY OF PROTEIN URINE: CPT | Performed by: STUDENT IN AN ORGANIZED HEALTH CARE EDUCATION/TRAINING PROGRAM

## 2023-12-27 PROCEDURE — 99418 PROLNG IP/OBS E/M EA 15 MIN: CPT | Performed by: STUDENT IN AN ORGANIZED HEALTH CARE EDUCATION/TRAINING PROGRAM

## 2023-12-27 PROCEDURE — 99223 1ST HOSP IP/OBS HIGH 75: CPT | Performed by: STUDENT IN AN ORGANIZED HEALTH CARE EDUCATION/TRAINING PROGRAM

## 2023-12-27 PROCEDURE — 250N000009 HC RX 250: Performed by: EMERGENCY MEDICINE

## 2023-12-27 PROCEDURE — 250N000013 HC RX MED GY IP 250 OP 250 PS 637: Performed by: EMERGENCY MEDICINE

## 2023-12-27 PROCEDURE — 82962 GLUCOSE BLOOD TEST: CPT

## 2023-12-27 PROCEDURE — 93005 ELECTROCARDIOGRAM TRACING: CPT

## 2023-12-27 PROCEDURE — 36415 COLL VENOUS BLD VENIPUNCTURE: CPT | Performed by: EMERGENCY MEDICINE

## 2023-12-27 PROCEDURE — 99223 1ST HOSP IP/OBS HIGH 75: CPT | Performed by: PHYSICIAN ASSISTANT

## 2023-12-27 RX ORDER — DEXTROSE MONOHYDRATE 25 G/50ML
25-50 INJECTION, SOLUTION INTRAVENOUS
Status: DISCONTINUED | OUTPATIENT
Start: 2023-12-27 | End: 2023-12-28 | Stop reason: HOSPADM

## 2023-12-27 RX ORDER — METHADONE HYDROCHLORIDE 5 MG/1
5 TABLET ORAL 2 TIMES DAILY
COMMUNITY
End: 2024-01-11

## 2023-12-27 RX ORDER — BACLOFEN 5 MG/1
5-10 TABLET ORAL 3 TIMES DAILY PRN
Status: DISCONTINUED | OUTPATIENT
Start: 2023-12-27 | End: 2023-12-28 | Stop reason: HOSPADM

## 2023-12-27 RX ORDER — HYDRALAZINE HYDROCHLORIDE 25 MG/1
25 TABLET, FILM COATED ORAL ONCE
Status: DISCONTINUED | OUTPATIENT
Start: 2023-12-27 | End: 2023-12-27

## 2023-12-27 RX ORDER — POLYETHYLENE GLYCOL 3350 17 G/17G
17 POWDER, FOR SOLUTION ORAL DAILY PRN
Status: DISCONTINUED | OUTPATIENT
Start: 2023-12-27 | End: 2023-12-28 | Stop reason: HOSPADM

## 2023-12-27 RX ORDER — CALCIUM CARBONATE 500 MG/1
1000 TABLET, CHEWABLE ORAL 4 TIMES DAILY PRN
Status: DISCONTINUED | OUTPATIENT
Start: 2023-12-27 | End: 2023-12-28 | Stop reason: HOSPADM

## 2023-12-27 RX ORDER — ACETAMINOPHEN 325 MG/1
325-650 TABLET ORAL EVERY 6 HOURS PRN
Status: DISCONTINUED | OUTPATIENT
Start: 2023-12-27 | End: 2023-12-28 | Stop reason: HOSPADM

## 2023-12-27 RX ORDER — ALBUTEROL SULFATE 90 UG/1
2 AEROSOL, METERED RESPIRATORY (INHALATION) EVERY 6 HOURS PRN
Status: DISCONTINUED | OUTPATIENT
Start: 2023-12-27 | End: 2023-12-28 | Stop reason: HOSPADM

## 2023-12-27 RX ORDER — PROPRANOLOL HYDROCHLORIDE 40 MG/1
40 TABLET ORAL 3 TIMES DAILY
Status: DISCONTINUED | OUTPATIENT
Start: 2023-12-27 | End: 2023-12-28 | Stop reason: HOSPADM

## 2023-12-27 RX ORDER — LIDOCAINE 40 MG/G
CREAM TOPICAL
Status: DISCONTINUED | OUTPATIENT
Start: 2023-12-27 | End: 2023-12-28 | Stop reason: HOSPADM

## 2023-12-27 RX ORDER — NALOXONE HYDROCHLORIDE 0.4 MG/ML
0.2 INJECTION, SOLUTION INTRAMUSCULAR; INTRAVENOUS; SUBCUTANEOUS
Status: DISCONTINUED | OUTPATIENT
Start: 2023-12-27 | End: 2023-12-28 | Stop reason: HOSPADM

## 2023-12-27 RX ORDER — DULOXETIN HYDROCHLORIDE 30 MG/1
30 CAPSULE, DELAYED RELEASE ORAL 2 TIMES DAILY
COMMUNITY
End: 2024-06-19

## 2023-12-27 RX ORDER — AMOXICILLIN 250 MG
2 CAPSULE ORAL 2 TIMES DAILY PRN
Status: DISCONTINUED | OUTPATIENT
Start: 2023-12-27 | End: 2023-12-28 | Stop reason: HOSPADM

## 2023-12-27 RX ORDER — LEVETIRACETAM 500 MG/1
1000 TABLET ORAL 2 TIMES DAILY
Status: DISCONTINUED | OUTPATIENT
Start: 2023-12-27 | End: 2023-12-28 | Stop reason: HOSPADM

## 2023-12-27 RX ORDER — NALOXONE HYDROCHLORIDE 0.4 MG/ML
0.4 INJECTION, SOLUTION INTRAMUSCULAR; INTRAVENOUS; SUBCUTANEOUS
Status: DISCONTINUED | OUTPATIENT
Start: 2023-12-27 | End: 2023-12-28 | Stop reason: HOSPADM

## 2023-12-27 RX ORDER — LISINOPRIL 40 MG/1
40 TABLET ORAL DAILY
Status: DISCONTINUED | OUTPATIENT
Start: 2023-12-28 | End: 2023-12-28 | Stop reason: HOSPADM

## 2023-12-27 RX ORDER — HYDRALAZINE HYDROCHLORIDE 25 MG/1
25 TABLET, FILM COATED ORAL DAILY
COMMUNITY
End: 2023-12-28

## 2023-12-27 RX ORDER — AMOXICILLIN 250 MG
1 CAPSULE ORAL 2 TIMES DAILY PRN
Status: DISCONTINUED | OUTPATIENT
Start: 2023-12-27 | End: 2023-12-28 | Stop reason: HOSPADM

## 2023-12-27 RX ORDER — NICOTINE POLACRILEX 4 MG
15-30 LOZENGE BUCCAL
Status: DISCONTINUED | OUTPATIENT
Start: 2023-12-27 | End: 2023-12-28 | Stop reason: HOSPADM

## 2023-12-27 RX ORDER — CYCLOBENZAPRINE HCL 5 MG
5 TABLET ORAL
Status: DISCONTINUED | OUTPATIENT
Start: 2023-12-27 | End: 2023-12-28 | Stop reason: HOSPADM

## 2023-12-27 RX ORDER — DULOXETIN HYDROCHLORIDE 30 MG/1
30 CAPSULE, DELAYED RELEASE ORAL 2 TIMES DAILY
Status: DISCONTINUED | OUTPATIENT
Start: 2023-12-27 | End: 2023-12-28 | Stop reason: HOSPADM

## 2023-12-27 RX ORDER — HYDRALAZINE HYDROCHLORIDE 25 MG/1
25 TABLET, FILM COATED ORAL 3 TIMES DAILY PRN
Status: DISCONTINUED | OUTPATIENT
Start: 2023-12-27 | End: 2023-12-28

## 2023-12-27 RX ORDER — METOPROLOL TARTRATE 1 MG/ML
5 INJECTION, SOLUTION INTRAVENOUS ONCE
Status: COMPLETED | OUTPATIENT
Start: 2023-12-27 | End: 2023-12-27

## 2023-12-27 RX ORDER — METHADONE HYDROCHLORIDE 5 MG/1
5 TABLET ORAL 2 TIMES DAILY
Status: DISCONTINUED | OUTPATIENT
Start: 2023-12-27 | End: 2023-12-28 | Stop reason: HOSPADM

## 2023-12-27 RX ORDER — ACETAMINOPHEN 325 MG/1
325-650 TABLET ORAL EVERY 6 HOURS PRN
COMMUNITY

## 2023-12-27 RX ORDER — HYDROCHLOROTHIAZIDE 25 MG/1
50 TABLET ORAL DAILY
Status: DISCONTINUED | OUTPATIENT
Start: 2023-12-28 | End: 2023-12-28 | Stop reason: HOSPADM

## 2023-12-27 RX ORDER — OXYCODONE HYDROCHLORIDE 10 MG/1
10 TABLET ORAL
Status: DISCONTINUED | OUTPATIENT
Start: 2023-12-27 | End: 2023-12-28 | Stop reason: HOSPADM

## 2023-12-27 RX ORDER — HYDRALAZINE HYDROCHLORIDE 25 MG/1
25 TABLET, FILM COATED ORAL ONCE
Status: COMPLETED | OUTPATIENT
Start: 2023-12-27 | End: 2023-12-27

## 2023-12-27 RX ADMIN — RIVAROXABAN 20 MG: 20 TABLET, FILM COATED ORAL at 20:49

## 2023-12-27 RX ADMIN — METHADONE HYDROCHLORIDE 5 MG: 5 TABLET ORAL at 20:49

## 2023-12-27 RX ADMIN — HYDRALAZINE HYDROCHLORIDE 25 MG: 25 TABLET, FILM COATED ORAL at 15:00

## 2023-12-27 RX ADMIN — PROPRANOLOL HYDROCHLORIDE 40 MG: 40 TABLET ORAL at 20:49

## 2023-12-27 RX ADMIN — LEVETIRACETAM 1000 MG: 500 TABLET, FILM COATED ORAL at 20:49

## 2023-12-27 RX ADMIN — DULOXETINE HYDROCHLORIDE 30 MG: 30 CAPSULE, DELAYED RELEASE ORAL at 20:48

## 2023-12-27 RX ADMIN — METOPROLOL TARTRATE 5 MG: 5 INJECTION INTRAVENOUS at 17:20

## 2023-12-27 ASSESSMENT — ACTIVITIES OF DAILY LIVING (ADL)
ADLS_ACUITY_SCORE: 35

## 2023-12-27 ASSESSMENT — PAIN SCALES - GENERAL: PAINLEVEL: NO PAIN (0)

## 2023-12-27 NOTE — NURSING NOTE
"Rapid Response Epic Documentation     Situation:   RRT requested to 2nd floor lab/7G.      Objective:    RRT arrived and found adult male sitting in lab chair with RN.   RN reported pt arrived for lab draw and was placed onto vitals tower.  RN reported series of high blood pressure readings.  RN reported BP: 148/160 with pt c/o vision changes x 1 week.     Assessment:   At pt side.  Pt greeted RRT and reported same complaint. Pt stated, \"My vision has been blurry for a week.\" Primary assessment: airway-open; breathing-normal/non-labored; circulation-strong/regular; skin-warm/pink/dry; PERRLA; A&O x 4.     Pt denied the following: headache/lightheadedness/dizziness; neck pn; chest pn/SOB; abd pn; back pn.       Vitals obtained.  BP taken manually and found to be similar to vitals tower.        BP:  148/160, 248/152    Pulse: 79, 78  Respiration: 18, 18  SPO2: 98 %  Glucose: NA  Mental Status: Alert  CMS: Intact  Stroke Scale: Negative  EKG: Not Performed      911 requested for transport to [facility].  Pt initially refused, but agreed to transport after discussing BP and potential risks with physician.   Pt monitored while waiting for HEMS.  No changes to pt condition observed or reported.     Disposition:      Transfer to Emergency Room Via: 911    Protocol Used:     Other Hypertension        "

## 2023-12-27 NOTE — ED PROVIDER NOTES
[place] EMERGENCY DEPARTMENT ([facility])    12/27/23       ED PROVIDER NOTE       History     Chief Complaint   Patient presents with    Hypertension     The history is provided by the patient and medical records.     Connor Emerson is a 43-year-old male with a history of metastatic lung cancer, with brain metastases, prior seizures, hypertension who presents to the emergency department from clinic due to elevated blood pressure.  He was in clinic today for his regular chemoinfusion.  In clinic he was incidentally noted to have elevated blood pressures.  He does report over the last 2 weeks he has had blurriness of his vision otherwise denies any symptoms.  Denies any headache, nausea, vomiting, numbness, tingling or weakness in his extremities, speech changes, dizziness, chest pain, shortness of breath, abdominal pain, changes in urine output or lower extremity swelling.  He has been compliant with his home antihypertensives.  He reports that his last chemotherapy he was noted to have elevated blood pressures and was given hydralazine.  He has not yet started this medication.  His last chemo was held due to elevated blood pressures at that time.  He denies any recent changes in diet.  Denies any falls or head injuries.  Denies any recent fevers or other infectious symptoms.        Past Medical History  Past Medical History:   Diagnosis Date    Atypical chest pain 12/02/2013    Cancer (H)     Complication of anesthesia     Diabetes (H)     GERD (gastroesophageal reflux disease) 12/02/2013    History of pulmonary embolism     HTN (hypertension) 05/14/2012    HTN, goal below 140/90 07/02/2013    Insomnia 02/21/2012    Mediastinal lymphadenopathy     Migraine headache 07/02/2013    Migraine with aura, without mention of intractable migraine without mention of status migrainosus     Pneumonia      Past Surgical History:   Procedure Laterality Date    BRONCHOSCOPY RIGID OR FLEXIBLE W/TRANSENDOSCOPIC  ENDOBRONCHIAL ULTRASOUND GUIDED Bilateral 1/26/2022    Procedure: Right BRONCHOSCOPY, FIBEROPTIC, endobronchial ultrasound, pleural biopsy;  Surgeon: Dallin Agrawal MD;  Location: [facility] OR    INJECT BLOCK MEDIAL BRANCH CERVICAL/THORACIC/LUMBAR      INSERT CHEST TUBE Right 2/16/2022    Procedure: INSERTION, CATHETER, INTERCOSTAL, FOR DRAINAGE;  Surgeon: Dallin Agrawal MD;  Location: [facility] GI    INSERT CHEST TUBE Right 3/9/2022    Procedure: INSERTION, CATHETER, INTERCOSTAL, FOR DRAINAGE;  Surgeon: Sushila Antonio MD;  Location: [facility] GI    IR CHEST TUBE REMOVAL TUNNELED RIGHT  4/2/2022    OPTICAL TRACKING SYSTEM CRANIOTOMY, EXCISE TUMOR, COMBINED Left 6/28/2022    Procedure: Left stealth craniotomy for tumor resection with motor mapping;  Surgeon: Stephen Moran MD;  Location:  OR    OPTICAL TRACKING SYSTEM CRANIOTOMY, EXCISE TUMOR, COMBINED Right 6/27/2023    Procedure: Right stealth craniotomy and resection of tumor;  Surgeon: Stephen Moran MD;  Location:  OR    ORTHOPEDIC SURGERY      Ganesh. Rotator cuff repair.    PLEUROSCOPY N/A 1/26/2022    Procedure: Pleuroscopy with Pleural Biopsy;  Surgeon: Dallin Agrawal MD;  Location: [facility] OR     acetaminophen (TYLENOL) 325 MG tablet  albuterol (PROAIR HFA/PROVENTIL HFA/VENTOLIN HFA) 108 (90 Base) MCG/ACT inhaler  Alcohol Swabs PADS  baclofen (LIORESAL) 10 MG tablet  blood glucose (NO BRAND SPECIFIED) lancets standard  blood glucose (NO BRAND SPECIFIED) test strip  blood glucose monitoring (SOFTCLIX) lancets  Blood Glucose Monitoring Suppl (ACCU-CHEK GUIDE) w/Device KIT  brigatinib (ALUNBRIG) 30 MG TABS tablet  brigatinib (ALUNBRIG) 30 MG TABS tablet  Continuous Blood Gluc Sensor (FREESTYLE IRENE 2 SENSOR) MISC  DULoxetine (CYMBALTA) 30 MG capsule  hydrALAZINE (APRESOLINE) 25 MG tablet  hydrochlorothiazide (HYDRODIURIL) 50 MG tablet  insulin aspart (NOVOLOG PEN) 100 UNIT/ML pen  insulin glargine (LANTUS PEN) 100 UNIT/ML pen  insulin pen needle (32G X 4 MM) 32G X  4 MM miscellaneous  levETIRAcetam (KEPPRA) 1000 MG tablet  lidocaine-prilocaine (EMLA) 2.5-2.5 % external cream  lisinopril (ZESTRIL) 40 MG tablet  methadone (DOLOPHINE) 5 MG tablet  naloxone (NARCAN) 4 MG/0.1ML nasal spray  oxyCODONE IR (ROXICODONE) 10 MG tablet  propranolol (INDERAL) 20 MG tablet  rivaroxaban ANTICOAGULANT (XARELTO) 20 MG TABS tablet  [employer] Container MISC      Allergies   Allergen Reactions    Vicodin [Hydrocodone-Acetaminophen] Nausea and Vomiting and GI Disturbance     Family History  Family History   Problem Relation Age of Onset    Unknown/Adopted Mother     Uterine Cancer Mother     Unknown/Adopted Father     Unknown/Adopted Maternal Grandmother     Unknown/Adopted Maternal Grandfather     Stomach Cancer Maternal Grandfather     Unknown/Adopted Paternal Grandmother     Unknown/Adopted Paternal Grandfather     Melanoma Maternal Uncle      Social History   Social History     Tobacco Use    Smoking status: Never     Passive exposure: Never    Smokeless tobacco: Never   Vaping Use    Vaping Use: Never used   Substance Use Topics    Alcohol use: Yes     Alcohol/week: 0.0 standard drinks of alcohol     Comment: social    Drug use: No         A complete review of systems was performed with pertinent positives and negatives noted in the HPI, and all other systems negative.    Physical Exam   BP: (!) 215/126  Pulse: 72  Temp: 98.5  F (36.9  C)  Resp: 18  SpO2: 98 %  Physical Exam  Vitals and nursing note reviewed.       General: patient is alert and oriented and in no acute distress   Head: atraumatic and normocephalic   EENT: moist mucus membranes without tonsillar erythema or exudates, pupils 2mm equal round and reactive, extraocular movements intact  Neck: supple without meningismus  Cardiovascular: regular rate and rhythm, extremities warm and well perfused, no lower extremity edema  Pulmonary: lungs clear to auscultation bilaterally   Abdomen: soft, non-tender   Musculoskeletal: normal range of  motion   Neurological: alert and oriented, moving all extremities symmetrically, CN II-XII intact, strength 5/5 and symmetric in , elbow flexion/extension, hip flexion/extension, knee flexion/extension and ankle plantar/dorsiflexion, sensation to light touch in distal upper and lower extremities intact, normal finger to nose bilaterally  Skin: warm, dry       ED Course, Procedures, & Data      Procedures            EKG Interpretation:      Interpreted by Michelle Velasquez MD  Time reviewed: 1250  Symptoms at time of EKG: None   Rhythm: normal sinus   Rate: Normal  Axis: Normal  Ectopy: none  Conduction: normal  ST Segments/ T Waves: No acute ischemic changes  Q Waves: none  Comparison to prior: Unchanged    Clinical Impression: no acute changes                       No results found for any visits on 12/27/23.  Medications - No data to display  Labs Ordered and Resulted from Time of ED Arrival to Time of ED Departure - No data to display  No orders to display          Critical care was not performed.     Medical Decision Making  The patient's presentation was of high complexity (an acute health issue posing potential threat to life or bodily function).    The patient's evaluation involved:  review of external note(s) from 1 sources (oncology notes)  ordering and/or review of 3+ test(s) in this encounter (see separate area of note for details)  discussion of management or test interpretation with another health professional (oncology, IM)    The patient's management necessitated moderate risk (prescription drug management including medications given in the ED) and high risk (a decision regarding hospitalization).    Assessment & Plan    Mr. Emerson is a 43-year-old male with metastatic lung cancer with brain metastases who presents to the emergency department with hypertension.  He is noted to have slightly blurred vision for the last 2 weeks otherwise denies any associated symptoms.  He was given hydralazine  and IV metoprolol.  CT of the head shows no show no evidence of intracranial hemorrhage, known metastatic disease is present.  ECG shows no acute ischemic changes.  Laboratory evaluation shows no evidence of anemia, creatinine is at baseline however he does have increasing proteinuria.  Discussed with oncology and known side effect of his chemotherapy is hypertension which can also cause increased bleeding and thrombosis.  Discussed finding with patient and recommended admission for hypertensive urgency and ongoing blood pressure management.  His blood pressure is slowly improving and will continue to gradually lower during his hospital stay.    I have reviewed the nursing notes. I have reviewed the findings, diagnosis, plan and need for follow up with the patient.    New Prescriptions    No medications on file       Final diagnoses:   Hypertensive urgency   Primary malignant neoplasm of lung metastatic to other site, unspecified laterality (H)       Michelle Velasquez  I, Bello Rubalcava, am serving as a trained medical scribe to document services personally performed by Michelle Velasquez MD based on the provider's statements to me on December 27, 2023.  This document has been checked and approved by the attending provider.    I, Michelle Velasquez MD, was physically present and have reviewed and verified the accuracy of this note documented by Bello Rubalcava medical scribe.      Michelle Velasquez MD   [facility] EMERGENCY DEPARTMENT  12/27/2023     Michelle Velasquez MD  12/27/23 1944

## 2023-12-27 NOTE — NURSING NOTE
"Chief Complaint   Patient presents with    Blood Draw     VS taken, no labs obtained      Blood pressure 219/130 in lab; rapid response team called and analyzed patient. Patient denied chest pain and headache. Patient did state he was having blurred vision \"for the last couple of weeks.\" 911 called and patient sent to [place] ED for further evaluation.  "

## 2023-12-27 NOTE — ED TRIAGE NOTES
Pt BIBA with c/o hypertension. Per EMS pt was in clinic for chemo and found to have systolics in the 250s. Pt endorsing 2 weeks of blurry vision. Pt denied care via EMS in route.      Triage Assessment (Adult)       Row Name 12/27/23 1230          Triage Assessment    Airway WDL WDL        Respiratory WDL    Respiratory WDL WDL        Skin Circulation/Temperature WDL    Skin Circulation/Temperature WDL WDL        Cardiac WDL    Cardiac WDL WDL        Peripheral/Neurovascular WDL    Peripheral Neurovascular WDL WDL        Cognitive/Neuro/Behavioral WDL    Cognitive/Neuro/Behavioral WDL WDL

## 2023-12-28 VITALS
TEMPERATURE: 98.2 F | DIASTOLIC BLOOD PRESSURE: 111 MMHG | OXYGEN SATURATION: 96 % | WEIGHT: 224 LBS | BODY MASS INDEX: 33.18 KG/M2 | HEART RATE: 77 BPM | SYSTOLIC BLOOD PRESSURE: 159 MMHG | HEIGHT: 69 IN | RESPIRATION RATE: 18 BRPM

## 2023-12-28 PROBLEM — I16.0 HYPERTENSIVE URGENCY: Status: RESOLVED | Noted: 2023-12-27 | Resolved: 2023-12-28

## 2023-12-28 PROBLEM — I10 ESSENTIAL HYPERTENSION: Chronic | Status: ACTIVE | Noted: 2023-12-28

## 2023-12-28 PROBLEM — I10 ESSENTIAL HYPERTENSION: Status: ACTIVE | Noted: 2023-12-28

## 2023-12-28 LAB
ALBUMIN MFR UR ELPH: 111 MG/DL
ALBUMIN SERPL BCG-MCNC: 4.2 G/DL (ref 3.5–5.2)
ALP SERPL-CCNC: 83 U/L (ref 40–150)
ALT SERPL W P-5'-P-CCNC: 13 U/L (ref 0–70)
ANION GAP SERPL CALCULATED.3IONS-SCNC: 11 MMOL/L (ref 7–15)
AST SERPL W P-5'-P-CCNC: 20 U/L (ref 0–45)
ATRIAL RATE - MUSE: 69 BPM
BILIRUB SERPL-MCNC: 0.6 MG/DL
BUN SERPL-MCNC: 15.7 MG/DL (ref 6–20)
CALCIUM SERPL-MCNC: 9.3 MG/DL (ref 8.6–10)
CHLORIDE SERPL-SCNC: 102 MMOL/L (ref 98–107)
CREAT SERPL-MCNC: 0.64 MG/DL (ref 0.67–1.17)
CREAT UR-MCNC: 116 MG/DL
CREAT UR-MCNC: 116 MG/DL
DEPRECATED HCO3 PLAS-SCNC: 26 MMOL/L (ref 22–29)
DIASTOLIC BLOOD PRESSURE - MUSE: NORMAL MMHG
EGFRCR SERPLBLD CKD-EPI 2021: >90 ML/MIN/1.73M2
ERYTHROCYTE [DISTWIDTH] IN BLOOD BY AUTOMATED COUNT: 13.1 % (ref 10–15)
GLUCOSE BLDC GLUCOMTR-MCNC: 180 MG/DL (ref 70–99)
GLUCOSE BLDC GLUCOMTR-MCNC: 185 MG/DL (ref 70–99)
GLUCOSE BLDC GLUCOMTR-MCNC: 193 MG/DL (ref 70–99)
GLUCOSE SERPL-MCNC: 165 MG/DL (ref 70–99)
HCT VFR BLD AUTO: 47.4 % (ref 40–53)
HGB BLD-MCNC: 16.3 G/DL (ref 13.3–17.7)
INTERPRETATION ECG - MUSE: NORMAL
MCH RBC QN AUTO: 31.2 PG (ref 26.5–33)
MCHC RBC AUTO-ENTMCNC: 34.4 G/DL (ref 31.5–36.5)
MCV RBC AUTO: 91 FL (ref 78–100)
P AXIS - MUSE: 7 DEGREES
PLATELET # BLD AUTO: 214 10E3/UL (ref 150–450)
POTASSIUM SERPL-SCNC: 3.8 MMOL/L (ref 3.4–5.3)
PR INTERVAL - MUSE: 158 MS
PROT SERPL-MCNC: 6.7 G/DL (ref 6.4–8.3)
PROT/CREAT 24H UR: 0.96 MG/MG CR (ref 0–0.2)
QRS DURATION - MUSE: 84 MS
QT - MUSE: 426 MS
QTC - MUSE: 456 MS
R AXIS - MUSE: 78 DEGREES
RBC # BLD AUTO: 5.22 10E6/UL (ref 4.4–5.9)
SODIUM SERPL-SCNC: 139 MMOL/L (ref 135–145)
SYSTOLIC BLOOD PRESSURE - MUSE: NORMAL MMHG
T AXIS - MUSE: 18 DEGREES
VENTRICULAR RATE- MUSE: 69 BPM
WBC # BLD AUTO: 11.5 10E3/UL (ref 4–11)

## 2023-12-28 PROCEDURE — 250N000013 HC RX MED GY IP 250 OP 250 PS 637: Performed by: PHYSICIAN ASSISTANT

## 2023-12-28 PROCEDURE — 99255 IP/OBS CONSLTJ NEW/EST HI 80: CPT | Mod: GC | Performed by: INTERNAL MEDICINE

## 2023-12-28 PROCEDURE — 250N000011 HC RX IP 250 OP 636: Performed by: HOSPITALIST

## 2023-12-28 PROCEDURE — 85027 COMPLETE CBC AUTOMATED: CPT | Performed by: PHYSICIAN ASSISTANT

## 2023-12-28 PROCEDURE — 82040 ASSAY OF SERUM ALBUMIN: CPT | Performed by: PHYSICIAN ASSISTANT

## 2023-12-28 PROCEDURE — 99239 HOSP IP/OBS DSCHRG MGMT >30: CPT | Performed by: STUDENT IN AN ORGANIZED HEALTH CARE EDUCATION/TRAINING PROGRAM

## 2023-12-28 PROCEDURE — 36415 COLL VENOUS BLD VENIPUNCTURE: CPT | Performed by: PHYSICIAN ASSISTANT

## 2023-12-28 PROCEDURE — 250N000012 HC RX MED GY IP 250 OP 636 PS 637: Performed by: PHYSICIAN ASSISTANT

## 2023-12-28 PROCEDURE — 96375 TX/PRO/DX INJ NEW DRUG ADDON: CPT

## 2023-12-28 PROCEDURE — 82962 GLUCOSE BLOOD TEST: CPT

## 2023-12-28 PROCEDURE — G0378 HOSPITAL OBSERVATION PER HR: HCPCS

## 2023-12-28 RX ORDER — HYDRALAZINE HYDROCHLORIDE 25 MG/1
25 TABLET, FILM COATED ORAL 3 TIMES DAILY
Qty: 90 TABLET | Refills: 1 | Status: SHIPPED | OUTPATIENT
Start: 2023-12-28 | End: 2024-06-19

## 2023-12-28 RX ORDER — HYDRALAZINE HYDROCHLORIDE 20 MG/ML
10-20 INJECTION INTRAMUSCULAR; INTRAVENOUS ONCE
Status: COMPLETED | OUTPATIENT
Start: 2023-12-28 | End: 2023-12-28

## 2023-12-28 RX ORDER — HYDRALAZINE HYDROCHLORIDE 25 MG/1
25 TABLET, FILM COATED ORAL 3 TIMES DAILY
Status: DISCONTINUED | OUTPATIENT
Start: 2023-12-28 | End: 2023-12-28 | Stop reason: HOSPADM

## 2023-12-28 RX ADMIN — PROPRANOLOL HYDROCHLORIDE 40 MG: 40 TABLET ORAL at 14:01

## 2023-12-28 RX ADMIN — LISINOPRIL 40 MG: 40 TABLET ORAL at 08:39

## 2023-12-28 RX ADMIN — DULOXETINE HYDROCHLORIDE 30 MG: 30 CAPSULE, DELAYED RELEASE ORAL at 08:39

## 2023-12-28 RX ADMIN — HYDRALAZINE HYDROCHLORIDE 10 MG: 20 INJECTION INTRAMUSCULAR; INTRAVENOUS at 02:36

## 2023-12-28 RX ADMIN — HYDRALAZINE HYDROCHLORIDE 25 MG: 25 TABLET, FILM COATED ORAL at 00:03

## 2023-12-28 RX ADMIN — OXYCODONE HYDROCHLORIDE 10 MG: 10 TABLET ORAL at 00:51

## 2023-12-28 RX ADMIN — HYDROCHLOROTHIAZIDE 50 MG: 25 TABLET ORAL at 08:38

## 2023-12-28 RX ADMIN — RIVAROXABAN 20 MG: 20 TABLET, FILM COATED ORAL at 17:51

## 2023-12-28 RX ADMIN — INSULIN GLARGINE 71 UNITS: 100 INJECTION, SOLUTION SUBCUTANEOUS at 08:49

## 2023-12-28 RX ADMIN — INSULIN ASPART 2 UNITS: 100 INJECTION, SOLUTION INTRAVENOUS; SUBCUTANEOUS at 17:43

## 2023-12-28 RX ADMIN — INSULIN ASPART 1 UNITS: 100 INJECTION, SOLUTION INTRAVENOUS; SUBCUTANEOUS at 08:49

## 2023-12-28 RX ADMIN — INSULIN ASPART 1 UNITS: 100 INJECTION, SOLUTION INTRAVENOUS; SUBCUTANEOUS at 14:02

## 2023-12-28 RX ADMIN — METHADONE HYDROCHLORIDE 5 MG: 5 TABLET ORAL at 08:51

## 2023-12-28 RX ADMIN — LEVETIRACETAM 1000 MG: 500 TABLET, FILM COATED ORAL at 08:39

## 2023-12-28 RX ADMIN — PROPRANOLOL HYDROCHLORIDE 40 MG: 40 TABLET ORAL at 08:38

## 2023-12-28 RX ADMIN — ACETAMINOPHEN 650 MG: 325 TABLET, FILM COATED ORAL at 14:01

## 2023-12-28 ASSESSMENT — ACTIVITIES OF DAILY LIVING (ADL)
ADLS_ACUITY_SCORE: 35

## 2023-12-28 NOTE — MEDICATION SCRIBE - ADMISSION MEDICATION HISTORY
Medication Scribe Admission Medication History    Admission medication history is complete. The information provided in this note is only as accurate as the sources available at the time of the update.    Information Source(s): Patient and CareEverywhere/SureScripts via in-person    Pertinent Information: None    Changes made to PTA medication list:  Added: None  Deleted: Sharps container, Freestyle griffin, Brigatinib 30 mg, BS lancets (duplicate)  Changed: Acetaminophen 325 mg (Q8H) --> Acetaminophen 325 mg (PRN), Duloxetine 30 mg (every day) --> Duloxetine 30 mg (BID), Hydralazine 25 mg (PRN) --> Hydralazine 25 mg (every day), Lantus pen (70 units) --> Lantus pen (71 units), Methadone 5 mg (0.5 tab Qam, 1 tab Qpm) --> Methadone 5 mg (1 tab BID)    Medication Affordability:  Not including over the counter (OTC) medications, was there a time in the past 3 months when you did not take your medications as prescribed because of cost?: No    Allergies reviewed with patient and updates made in EHR: yes    Medication History Completed By: Yolanda Damon 12/27/2023 6:38 PM    Prior to Admission medications    Medication Sig Last Dose Taking? Auth Provider Long Term End Date   acetaminophen (TYLENOL) 325 MG tablet Take 325-650 mg by mouth every 6 hours as needed for mild pain Past Month at Unknown Yes Reported, Patient     albuterol (PROAIR HFA/PROVENTIL HFA/VENTOLIN HFA) 108 (90 Base) MCG/ACT inhaler Inhale 2 puffs into the lungs every 6 hours as needed for shortness of breath, wheezing or cough Past Month at Unknown Yes Radha Shetty Ra, APRN CNP Yes    Alcohol Swabs PADS Use to swab the area of the injection or jabier as directed. Per insurance coverage  Yes Paula Jacobo MD     baclofen (LIORESAL) 10 MG tablet Take 0.5-1 tablets (5-10 mg) by mouth 3 times daily as needed for other (hiccups) More than a month at Unknown Yes Ajith Brewer MD     blood glucose (NO BRAND SPECIFIED) lancets standard To use to test  glucose level in the blood Use to test blood sugar  4  times daily as directed. To accompany glucose monitor brands per insurance coverage.  Yes Paula Jacobo MD     blood glucose (NO BRAND SPECIFIED) test strip To use to test glucose level in the blood Use to test blood sugar  4 times daily as directed. To accompany glucose monitor brands per insurance coverage.  Yes Paula Jacobo MD     Blood Glucose Monitoring Suppl (ACCU-CHEK GUIDE) w/Device KIT   Yes Reported, Patient No    brigatinib (ALUNBRIG) 30 MG TABS tablet Take 4 tablets (120 mg) by mouth daily May be taken with or without food. Swallow whole. Do not crush or chew tablets. 12/27/2023 at Unknown Yes Sarina Navarro PA-C No    DULoxetine (CYMBALTA) 30 MG capsule Take 30 mg by mouth 2 times daily 12/26/2023 at pm Yes Reported, Patient No    hydrALAZINE (APRESOLINE) 25 MG tablet Take 25 mg by mouth daily 12/27/2023 at am Yes Reported, Patient No    hydrochlorothiazide (HYDRODIURIL) 50 MG tablet Take 1 tablet (50 mg) by mouth daily 12/27/2023 at am Yes Sarina Navarro PA-C Yes    insulin aspart (NOVOLOG PEN) 100 UNIT/ML pen Inject 0-40 Units Subcutaneous 3 times daily (before meals) Per discharge instructions sliding scale 12/26/2023 at Unknown Yes Paula Jacobo MD Yes    insulin glargine (LANTUS PEN) 100 UNIT/ML pen Inject 71 Units Subcutaneous every morning 12/26/2023 at am Yes Reported, Patient No    insulin pen needle (32G X 4 MM) 32G X 4 MM miscellaneous Use as directed by provider. Per insurance coverage  Yes Bassam Persaud MD     levETIRAcetam (KEPPRA) 1000 MG tablet Take 1 tablet (1,000 mg) by mouth 2 times daily 12/27/2023 at am Yes Sarina Navarro PA-C Yes    lidocaine-prilocaine (EMLA) 2.5-2.5 % external cream Apply topically as needed (pain due to port access) Apply to port 30 minutes prior to lab appointment. Past Month at Unknown Yes Bassam Persaud MD Yes    lisinopril (ZESTRIL) 40 MG tablet Take 1 tablet (40 mg)  by mouth daily 12/27/2023 at am Yes Scheierl, Amber J, APRN CNP Yes    methadone (DOLOPHINE) 5 MG tablet Take 5 mg by mouth 2 times daily 12/27/2023 at am Yes Reported, Patient     naloxone (NARCAN) 4 MG/0.1ML nasal spray Spray 1 spray (4 mg) into one nostril alternating nostrils as needed for opioid reversal every 2-3 minutes until assistance arrives  Yes Ajith Brewer MD Yes    oxyCODONE IR (ROXICODONE) 10 MG tablet Take 1 tablet (10 mg) by mouth every 3 hours as needed for moderate pain Past Week at Unknown Yes Ajith Brewer MD No    propranolol (INDERAL) 20 MG tablet Take 2 tablets (40 mg) by mouth 3 times daily 12/27/2023 at am Yes Sarina Navarro PA-C Yes    rivaroxaban ANTICOAGULANT (XARELTO) 20 MG TABS tablet Take 1 tablet (20 mg) by mouth daily (with dinner) 12/26/2023 at pm Yes Sarina Navarro PA-C Yes

## 2023-12-28 NOTE — CONSULTS
OPHTHALMOLOGY CONSULT NOTE  12/28/23    Patient: Connor Emerson  Consulted by: Chris Garvey PA-C  Reason for Consult: Bilateral blurry vision for 2 weeks     HISTORY OF PRESENTING ILLNESS:     Connor Emerson is a 43 year old male with history of DM, HTN, stage 4 NSCLC with brain metastasis s/p radiation c/b necrosis s/p craniotomy who presented from his infusion center for hypertensive emergency.     He was receiving treatments of brigatinib and bevacizumab 12/27/23 at his infusion center when his BP was measured in the 250's systolic prompting transfer to the ED. At bedside he reports a few weeks of constant bilateral painless blurry vision. Some of the blurry vision improves with focusing or blinking but there is a baseline decrease in his overall vision.     Of note, also has diabetes with A1c 6.7 12/27/23 but was > 8 for 3 years prior.     Blood pressures in the ED initially were >200 systolic and have decreased to 140's.     Review of systems were otherwise negative except for that which has been stated above.      OCULAR/MEDICAL/SURGICAL HISTORIES:     Past Ocular History:  Last eye exam: This spring   Prior eye surgery/laser: None  Contact lens wear: Yes   Glasses: No  Eyedrops: None      Past Medical History:   Diagnosis Date    Atypical chest pain 12/02/2013    Cancer (H)     Complication of anesthesia     Diabetes (H)     GERD (gastroesophageal reflux disease) 12/02/2013    History of pulmonary embolism     HTN (hypertension) 05/14/2012    HTN, goal below 140/90 07/02/2013    Insomnia 02/21/2012    Mediastinal lymphadenopathy     Migraine headache 07/02/2013    Migraine with aura, without mention of intractable migraine without mention of status migrainosus     Pneumonia        Past Surgical History:   Procedure Laterality Date    BRONCHOSCOPY RIGID OR FLEXIBLE W/TRANSENDOSCOPIC ENDOBRONCHIAL ULTRASOUND GUIDED Bilateral 1/26/2022    Procedure: Right BRONCHOSCOPY, FIBEROPTIC, endobronchial ultrasound,  pleural biopsy;  Surgeon: Dallin Agrawal MD;  Location: [facility] OR    INJECT BLOCK MEDIAL BRANCH CERVICAL/THORACIC/LUMBAR      INSERT CHEST TUBE Right 2/16/2022    Procedure: INSERTION, CATHETER, INTERCOSTAL, FOR DRAINAGE;  Surgeon: Dallin Agrawal MD;  Location: [facility] GI    INSERT CHEST TUBE Right 3/9/2022    Procedure: INSERTION, CATHETER, INTERCOSTAL, FOR DRAINAGE;  Surgeon: Sushila Antonio MD;  Location: [facility] GI    IR CHEST TUBE REMOVAL TUNNELED RIGHT  4/2/2022    OPTICAL TRACKING SYSTEM CRANIOTOMY, EXCISE TUMOR, COMBINED Left 6/28/2022    Procedure: Left stealth craniotomy for tumor resection with motor mapping;  Surgeon: Stephen Moran MD;  Location:  OR    OPTICAL TRACKING SYSTEM CRANIOTOMY, EXCISE TUMOR, COMBINED Right 6/27/2023    Procedure: Right stealth craniotomy and resection of tumor;  Surgeon: Stephen Moran MD;  Location:  OR    ORTHOPEDIC SURGERY      Ganesh. Rotator cuff repair.    PLEUROSCOPY N/A 1/26/2022    Procedure: Pleuroscopy with Pleural Biopsy;  Surgeon: Dallin Agrawal MD;  Location: [facility] OR       EXAMINATION:     Base Eye Exam       Visual Acuity (Snellen - Linear)         Right Left    Dist cc 20/25 20/25    Near cc 20/30 20/40              Tonometry (Tonopen, 3:51 PM)         Right Left    Pressure 19 16              Pupils         APD    Right None    Left None              Visual Fields         Left Right     Full Full              Extraocular Movement         Right Left     Full, Ortho Full, Ortho              Dilation       Both eyes: 1.0% Mydriacyl, 2.5% Dwain Synephrine                   Additional Tests       Color         Right Left    Ishihara 15/15 15/15                  Slit Lamp and Fundus Exam       External Exam         Right Left    External Normal Normal              Slit Lamp Exam         Right Left    Lids/Lashes Normal Normal    Conjunctiva/Sclera White and quiet White and quiet    Cornea CTL, inferior 2+ PEE CTL, inferior 2+ PEE    Anterior Chamber Deep and quiet  Deep and quiet    Iris Round and reactive --> dilated Round and reactive --> dilated    Lens trace NS trace NS    Anterior Vitreous Normal Normal              Fundus Exam         Right Left    Disc Normal Normal    C/D Ratio 0.3 0.3    Macula Loss of foveal light reflex Loss of foveal light reflex    Vessels Attenuated, few scattered DBH Attenuated, few scattered DBH    Periphery Normal Normal                    Labs/Studies/Imaging Performed:  NA     ASSESSMENT/PLAN:     Connor Emerson is a 43 year old male who presents with     # Bilateral blurry vision  # Hypertensive retinopathy  # Dry eye  43 year old male presented with bilateral painless blurry vision in the setting of hypertensive emergency found to have dry eye and very mild but present hypertensive retinopathy with loss of the foveal light reflex suggesting macular edema. His blood pressures have already partially responded to anti-hypertensive treatment. He has no signs of radiation optic neuropathy or other ophthalmic sequelae from his brain metastasis or craniotomy.     RECOMMENDATIONS:  - Continued blood pressure, lipid and blood glucose control per primary  - Can trial artificial tears QID PRN for dry eye     It is our pleasure to participate in this patient's care and treatment. Please contact us with any further questions or concerns.    Matty Osullivan MD  Resident Physician, PGY-2  Department of Ophthalmology      .[email or web address]

## 2023-12-28 NOTE — TELEPHONE ENCOUNTER
FUTURE VISIT INFORMATION      SURGERY INFORMATION:    Date: 22    Location: [facility] or    Surgeon:  Dallin Agrawal MD    Anesthesia Type:  General    Procedure: BRONCHOSCOPY, FIBEROPTIC, endobronchial ultrasound, transbronchial biopsies Pleuroscopy with Pleural Biopsy    RECORDS REQUESTED FROM:        Primary Care Provider: Radha Shetty Ra, APRN [facility]    Pertinent Medical History: hypertension    Most recent EKG+ Tracin22    Most recent ECHO: 22    Most recent Cardiac Stress Test: 2008Radha Bass       Photos Taken?: no Date Of Evaluation: 12/28/23 Estimated Length Of Stay: 1-2 Hours Admission Status: outpatient Surgery Scheduled: Explant, Lift Recovery Facility: [facility] Plastic Surgery Detail Level: Simple Coordination With:: Marine Date Of Surgery: 2/15/24 Surgeon: Katie

## 2023-12-28 NOTE — CONSULTS
[facility]  Oncology Consult Note  Date of Admission:  12/27/2023  Reason for Consult: hypertensive urgency while on Avastin     Assessment & Plan   1. Hypertensive Urgency  2. 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 He has been on Avastin since 7/20/23 for radiation necrosis and his last dose was 11/17/23. He endorses good medication compliance, thus likely needs optimization of antihypertensives. Will hold Avastin while his blood pressures stabilize. Per patient's primary oncologist Dr. Persaud, patient was due for repeat MRI brain at the end of January at which point it was to be determined whether he should continue Avastin or not. Thus can determine need for ongoing treatment with Avastin after hospital discharge with discussion with patient's primary oncologist.     Nephropathy has also been associated with Avastin and could also lead to worsening hypertension. Proteinuria noted on most recent UA. Recommend evaluation of nephropathy with 24 hr urine protein.      Plan:   - hold Avastin   - urine protein/ creatinine ratio or 24 hour urine protein   - appreciate primary team involvement in optimization of antihypertensives     We will continue to follow peripherally.     Please page if additional questions arise.      The patient's care was discussed with Dr. 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     He was sent from infusion center when systolic blood pressure noted to be 250. He states his blood pressures have been running higher than normal the last two weeks. Endorses headaches and blurry vision over the last two weeks as well. He has not changed medications and has been taking all his blood pressure medications. Denies new weakness or chest pain. He last received Avastin 11/17/23.     CT head on admission with no acute pathology. Since admission blood pressures have improved with medications and his headaches have improved. He continues to have blurry vision.      Oncologic History   Diagnosis:   Stage IV NSCLC, Rt lung adenocarcinoma with metastasis to pleura, mediastinum , rt pleural effusion and brain diagnosed 1/2022 (AJCC 8th edition)  PD-L1 TPS 2-3% by [place]   NGS [facility] panel-EML4:ALK rearragement  NGS Guardant- GNAS R201H, KRAS K5E- No ALK     2/23/2022- current: Brigatinib. Dose reduced to 60 mg due to cough after two days of 90 mg daily -->back on 90 mg---> increased to 180 mg  ---> settled on 120 mg     6/27/23- Right stealth craniotomy and resection of tumor:      7/20/23- now- Bevacizumab for radiation necrosis  )  Past:  2/15/22- GK to 11 brain lesions  3/2/22- 12/2022 Alectinib 300 mg BID (Dose reduced to 450 mg BID from 600 mg BID due to grade 3 myalgias 3/21/22, again reduced to 300 mg BID 9/28/22)  6/28/22- Craniotomy, resection  9/28/22-10/26- Bevacizumab for radiation necrosis (stopped due to PE)      1/19/22 to 1/22/22-Admitted to [facility] for 2 week progressive SOB secondary to have large rt sided pleural effusion, needing thoracentesis x2 (1.7L and 2.0 L removed), cytology positive for malignancy, adenocarcinoma.   1/26/22- Rt pleural mass biopsy-Dr. Agrawal--POSITIVE FOR ADENOCARCINOMA CONSISTENT WITH LUNG PRIMARY, admixed with mesothelial hyperplasia and inflammatory infiltrate (+ TTF-1 and CK 7;  negative  p40, calretinin and WT-1. PAX8 immunostain focal  +). 4th thoracentesis done simultaneously - 3L approx removed.   2/1/22- PET/CT-Right lower lobe central infiltrative FDG avid 8.2 x 9.6 cm mass representing a primary lung adenocarcinoma. Ipsilateral right perihilar, bilateral pretracheal, subcarinal and superior mediastinal michele metastases. Contralateral mildly FDG avid few lung nodules are suspicious for contralateral metastasis. At least 3 intracranial metastases in the right frontal lobe, left frontal lobe and left cerebellar hemisphere. Nonspecific mild diffuse bone marrow uptake. Further evaluation with a spine MRI could be considered to rule out early marrow infiltration. This could also be seen with red marrow conversion.  2/5/22-  Brain MRI- At least 9 intracranial metastases as detailed above. The dominant lesions involving the orbital right frontal lobe, the posterior left middle frontal gyrus, anterior right temporal lobe and in the left cerebellar hemisphere have surrounding moderate vasogenic type edema.  2/15/22- Saw Dr. Arango from Rad Onc- Rcd GK to 12 lesion in bran  2/16/22- Pleurex placement   3/2/22- Started Alectinib 600 mg BID  3/21/22- Dose reduced to 450 mg BID due to grade 3 myalgias and fatigue  4/2/22 to 4/5/22- Admitted at [facility] for- Severe sepsis due to MSSA infection of right PleurX catheter s/p removal- He presented with onset of pain at tube site starting 4/1; at arrival was tachycardic with leukocytosis (22.7) and elevated lactic acid (2.9).  CT chest showed fluid and stranding tracking outside the pleural space into chest wall along pleural catheter.  IR was consulted and removed catheter 4/2 with report of pustular drainage and tip culture growing MSSA.  Thoracic Surg was consulted who felt no surgical indication necessary given minimal pleural fluid and lack of any signs of abscess.  Initially treated with broad spectrum coverage for sepsis, narrowed to Ancef once sensitivities returned with plan to transition to  cefadroxil for an additional 10 days at discharge per ID. Held drug 4/2 to 4/11 5/2/22- CT CAP- Overall, positive response to therapy with decreased size of right lower lobe and right pleural-based masses, pulmonary metastases, hilar and mediastinal lymphadenopathy. However, a single right posterior pleural-based mass has slightly increased in size since 2/24/2022. No metastatic disease in the abdomen and pelvis. Right Pleurx catheter has been removed. Trace right pleural effusion and right basilar atelectasis.  5/2/22- Brain MRI- The previously demonstrated brain metastases are mildly diminished in size versus to 2/5/2022. The degree of edema is also diminished but not completely resolved. Probable trace amounts of intralesional bleeding demonstrated on the gradient sequence within the metastases. No definite new metastasis or progressive mass effect. No hydrocephalus or infarct.     6/15/22 to 6/17/22- Admitted at [facility]-with aphasia and word finding difficulty over last few weeks.  He presented to [facility] ED on 6/10 for evaluation of his symptoms. MRI brain showed multiple intracranial metastases, with interval enlargement of the dominant lesion within the left frontal lobe and increased surrounding vasogenic edema with 2 mm rightward shift of the septum pellucidum. Due to his worsening anxiety, he left AMA. His symptoms continued to progress to where he could not write at work so he decided to go to the ED for re-evaluation and treatment. Evaluated by NSGY, Rad Onc (radiaiton necrosis vs tumor progression).  6/16/22- MR Brain (6/16) shows multiple intracranial metastases, with interval enlargement  of the dominant lesion within the left frontal lobe and increased surrounding vasogenic edema with 2 mm rightward shift of the septum pellucidum.  6/16/22- - CT CAP shows slightly decreased size of right lower lobe and right pleural-based masses. No new pulmonary nodules or lymphadenopathy; No evidence of metastatic  disease in the abdomen or pelvis.   6/28/22 to 6/30/22- Admitted at [facility]- Elective left Stealth craniotomy with resection of brain tumor due to ongoing symptoms. No intraoperative complications. EBL 50 ml.  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  7/5/22- Ct CAP- Right lower lobe low-density nodules are not significantly changed. A small left upper lobe pulmonary nodule is also unchanged. Trace pleural fluid on the right has increased slightly. No convincing evidence for metastatic disease in the abdomen or pelvis.  7/18/22 to 7/19/22- Admitted to [facility] for seizure- Reportedly was only taking once Keppra instead of twice daily. Also resumed on dexamethasone 2 mg daily  8/1/22- Brain MRI- Redemonstrated postsurgical changes status post left frontoparietal Craniotomy. Interval increase in size of the dominant ring-enhancing lesion in the left posterior superior frontal lobe with increased moderate surrounding vasogenic edema and local mass effect resulting in narrowing of the supratentorial ventricular system. No significant midline shift/herniation at this time     8/1/22- Dex was increased to 4 mg daily by Dr. Moran     9/1/22- CT Chest- Near resolution of previously seen right pleural nodule. Stable right lower lobe pulmonary nodule     9/28/22- Bevacizumab for radiation necrosis     10/26/22- Bevacizumab      10/26/22- Ct CAP- Stable posterior medial right lower lobe 1.9 x 1.1 cm nodule series 8 image 176. Adjacent stable scarring and atelectasis. The previously noted pleural nodule posteriorly on the right is not currently clearly identified. Stable left upper lobe 0.3 cm nodule image 56     10/26/22- Brain MRI- Overall improved appearance of multiple intracranial metastases with near resolution of associated edema and diminished enhancement and size of multiple residual lesions. No definite new or progressive metastasis.     11/7/22 to 11/9/22- Admitted for PE and HTN urgency- Small pulmonary  embolism in the right lower lobe pulmonary artery. started on Lovenox, Brain MRI neg for PRES.     12/29/22- ED visit- bilateral hip pain, pain in shoulders, knuckles, knees, and ankles- holding alectinib since 12/20/22 1/6/23- Ct CAP- Mild groundglass nodularity in the left upper lobe is new since the previous exam, and may be infectious in etiology. No other significant interval change. Pulmonary nodules are not significantly changed.     1/6/23- Brain MRI- Stable to diminished sequelae of intracranial metastasis and treatment changes. No new or progressive metastasis. No superimposed acute intracranial finding.      2/23/2022- Start Brigatinib. Dose reduced to 60 mg due to cough after two days of 90 mg daily -  3/23/23-Brigatinib  (increase to 90 mg)     4/20/23- CT CAP- Stable- Previously noted mild groundglass nodularity in the left upper lobe has resolved.Pulmonary nodules are unchanged.Trace amount pleural fluid on the right has decreased slightly.     4/20/23- Brain MRI- Possile progression- Largest metastasis within the right frontal lobe has increased in size with worsening peripheral nodular enhancement and worsening vasogenic edema contributing to new right to left midline shift.  Multiple new/enlarging metastases scattered throughout the cerebral hemispheres and cerebellum. Started on dexamethasone by NGS on 4/28/23 5/17/23- presents to clinic with glucose 627, admission to hospital for stability on insulin drip     6/16/23- Brain MRI- Interval increase in size of the necrotic lesion in the anterior aspect of the right frontal lobe from 2.4 x 2.3 x 2.4 cm previously to 3.0 x 3.5 x 2.8 cm on the current study. Mass effect due to slightly increased vasogenic edema surrounding the right frontal lesion resulted in increase of leftward midline shift of the anterior interhemispheric fissure from 0.7 cm previously to 0.9 cm currently. Interval increase in size in two adjacent metastases at the  frontoparietal junction on the left. The remaining scattered intra-axial enhancing nodules are not changed appreciably in size or appearance since the comparison study.No evidence for acute intracranial pathology.   Dr. Moran- Due to worsening headaches and more problems with walking. Recommended a right Stealth craniotomy for resection of the lesion.      6/27/23- Right stealth craniotomy and resection of tumor: Final path: radiation necrosis     7/10/23- Ct CAP- stable Stable exam without evidence for new disease.Stable pulmonary nodules including a dominant nodular opacity at the right lower lobe.     7/20/23- Bevacizumab for radiation necrosis     8/16/23- Bevacizumab      9/12/23- Ct CAP-  Stable right lower lobe dominant nodular opacity. No new disease in the chest, abdomen and pelvis.     9/15, 10/6, 10/27, 11/17-  Bevacizumab     10/25/23- MRI Brain- 1. Redemonstrated postoperative changes of right frontal craniotomy. Decreased size of the right frontal resection cavity with significant decrease in the surrounding right frontal lobe vasogenic edema seen on the previous study. Mass effect has essentially resolved in the interim and there is no longer any midline shift. In the interim, slightly increased thickness of a rind of peripheral nodular enhancement along the posterior inferior and medial aspects of the right frontal lobe resection cavity, indeterminate. There is no significant elevated cerebral blood volume in this area. The findings may represent evolving posttreatment changes; however, residual tumor is not excluded.  Stable postoperative changes status post left anterior parietal craniotomy with irregular enhancement in the left frontal lobe parenchyma underlying the resection cavity, likely due to posttreatment change. Other scattered supratentorial and infratentorial metastatic lesions are overall similar to slightly decreased in size, as described.     12/5/23- PET/CT- with sole site of disease  in the RLL measuring 1.6 x 1.4 cm and with SUV max of 4.2. No other sites of disease.          Past Medical History    Past Medical History:   Diagnosis Date     Atypical chest pain 12/02/2013     Cancer (H)      Complication of anesthesia      Diabetes (H)      GERD (gastroesophageal reflux disease) 12/02/2013     History of pulmonary embolism      HTN (hypertension) 05/14/2012     HTN, goal below 140/90 07/02/2013     Insomnia 02/21/2012     Mediastinal lymphadenopathy      Migraine headache 07/02/2013     Migraine with aura, without mention of intractable migraine without mention of status migrainosus      Pneumonia        Past Surgical History   Past Surgical History:   Procedure Laterality Date     BRONCHOSCOPY RIGID OR FLEXIBLE W/TRANSENDOSCOPIC ENDOBRONCHIAL ULTRASOUND GUIDED Bilateral 1/26/2022    Procedure: Right BRONCHOSCOPY, FIBEROPTIC, endobronchial ultrasound, pleural biopsy;  Surgeon: Dallin Agrawal MD;  Location: [facility] OR     INJECT BLOCK MEDIAL BRANCH CERVICAL/THORACIC/LUMBAR       INSERT CHEST TUBE Right 2/16/2022    Procedure: INSERTION, CATHETER, INTERCOSTAL, FOR DRAINAGE;  Surgeon: Dallin Agrawal MD;  Location: [facility] GI     INSERT CHEST TUBE Right 3/9/2022    Procedure: INSERTION, CATHETER, INTERCOSTAL, FOR DRAINAGE;  Surgeon: Sushila Antonio MD;  Location: [facility] GI     IR CHEST TUBE REMOVAL TUNNELED RIGHT  4/2/2022     OPTICAL TRACKING SYSTEM CRANIOTOMY, EXCISE TUMOR, COMBINED Left 6/28/2022    Procedure: Left stealth craniotomy for tumor resection with motor mapping;  Surgeon: Stephen Moran MD;  Location:  OR     OPTICAL TRACKING SYSTEM CRANIOTOMY, EXCISE TUMOR, COMBINED Right 6/27/2023    Procedure: Right stealth craniotomy and resection of tumor;  Surgeon: Stephen Moran MD;  Location:  OR     ORTHOPEDIC SURGERY      Ganesh. Rotator cuff repair.     PLEUROSCOPY N/A 1/26/2022    Procedure: Pleuroscopy with Pleural Biopsy;  Surgeon: Dallin Agrawal MD;  Location:  OR        Medications   (Not in a hospital admission)         Physical Exam   Vital Signs: Temp: 98.1  F (36.7  C) Temp src: Oral BP: (!) 144/107 Pulse: 79   Resp: 20 SpO2: 98 % O2 Device: None (Room air)    Weight: 224 lbs 0 oz    General: no acute distress, conversant   HEENT: NC, AT, EOMI  Lung: no increased WOB on RA  Cardiac: warm distal extremities  Abdomen: nontender   Neuro: AAAX3, cranial nerves grossly intact       Data     I have personally reviewed the following data over the past 24 hrs:    11.5 (H)  \   16.3   / 214     139 102 15.7 /  180 (H)   3.8 26 0.64 (L) \       ALT: 13 AST: 20 AP: 83 TBILI: 0.6   ALB: 4.2 TOT PROTEIN: 6.7 LIPASE: N/A       TSH: N/A T4: N/A A1C: N/A

## 2023-12-28 NOTE — H&P
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 He is currently undergoing treatment with brigatinib and bevacizumab c/b worsening hypertension related specifically to the bevacizumab (Avastin). BP medications have been uptritrated outpatient, however when he arrived at [facility] center on 12/27/23 SBP noted to in the 250s and was sent emergently to ED; admitted for hypertensive urgency presumed related to chemotherapy, also patient reporting blurred vision for past 2 weeks.    Hypertensive Urgency  /130 on arrival to ED. Renal function, LFTs, CBC reassuring. He received PO hydralazine 25mg x 1 and 5mg IV Metoprolol x 1 in ED with improvement in SBP to 177/134. His headache has resolved with better BP control, though blurred vision remains unchanged (see below). CT Head wo revealed no acute intracranial abnormalities.   - Goal SBP overnight 160-170 range   - Neuro checks Q4H   - Will resume home PTA meds Propranolol 40mg TID, hydrochlorothiazide 50mg in am, Lisinopril 40mg in am.   - If BP remains elevated tomorrow, could consider adding PO hydralazine 25mg TID.   - Consider IV hydralazine overnight if needed.    Blurred Vision  Patient reports blurred vision for the past 2 weeks that does not wax and wane. It has not improved with better BP control. Reports he has been unable to read pill bottles. No eye pain. CT Head wo in ED with no acute intracranial abnormality.   - BP management overnight and  Ophthalmology consult tomorrow.   - If not improving with BP control and no structural explanation per ophthalmology team, then would consider MR Brain w/wo to assess for new metastatic disease as possible cause vs PRES is also a consideration. Could potentially involve neurology team pending clinical course.   - Brigatinib it appears can cause blurred vision as a common side effect. Blurred vision is rare with bevacizumab.    Stage IV NSCLC - R Lung Adenocarcinoma (mets pleural, mediastinum, R pleural effusion, brain)  Brain Mets - Biopsy w/ Radiation Necrosis  S/p R Stealth Craniotomy and Resection of Tumor (6/27/2023)    Persistent Headache  December PET showed some uptake in the lung, saw Dr Fu, not recommended biopsy. On as of brigatinib 2/22/23, multiple different doses, currently at 120mg, most recent reduction (July of 2023) due to myalgias. Brain MRI with several brain mets, s/p GK to 11-12 brain mets. F/u Brain MRI in June 2022 was showing enlargement of the one of the lesions along with edema, therefore had to undergo craniotomy followed by resection, the final biopsy consistent with radiation necrosis. Currently on bevacizumab for radiation necrosis --15 mg/kg every 3 weeks. (Of note, Connor S/p 2 doses of Bevacixumab fall of 2022, stopped due HTN urgency and PE.) MRI in 4/2023 showed contrast enhancement of lesions and a dominate lesion in the Rfrontal region with edema, several other smaller lesion. Short term MRI showing increase in size of the rt frontal lesion, underwent resection, patho again showing radiation necrosis. Improved on October imaging. Last Avastin dose cycle 6, day 1 on 11/17/23.  - Oncology consult; unclear if alterations will be required to his treatment plan if blurred vision and hypertensive urgency are ultimately attributed to his chemo.  - BP management as above  - Continue PTA Brigatinib   - Continue PTA Keppra 1000mg BID for seizure prophylaxis   - Headache present since  "craniotomy on 6/27/23. Resolved for a time in August after Dexamethasone taper, but have started to recur. Will need monitoring. Is resolved at current moment.   - Continue PTA Baclofen 5-10mg TID prn for hiccups    Chronic Cancer Related Pain  Following with palliative care outpatient.   - Continue Methadone 5mg BID   - Continue oxycodone 10mg Q3H prn   - Continue Duloxetine 30mg BID   - Continue Flexeril 5mg at HS    T2DM  Last A1C 8.3 4/6/23. PTA regimen lantus 71 units every morning. Appetite is good. Last dose of lantus morning day of admission.   - Continue Lantus 71 units in the morning   - Moderate sliding scale insulin available if needed   - Check A1C   - Regular diet   - Hypoglycemia protocol    PE - Provoked by Maliganancy and Bevacizumab (11/2022)   - Continue PTA Xarelto    Hypercalcemia  Ca 10.1 on admission labs.   - Recheck BMP in am           Diet: Combination Diet Regular Diet Adult  DVT Prophylaxis: DOAC  Heart Catheter: Not present  Lines: None     Cardiac Monitoring: None  Code Status: Full Code    Clinically Significant Risk Factors Present on Admission               # Drug Induced Coagulation Defect: home medication list includes an anticoagulant medication    # Hypertension: Noted on problem list      # Obesity: Estimated body mass index is 33.08 kg/m  as calculated from the following:    Height as of this encounter: 1.753 m (5' 9\").    Weight as of this encounter: 101.6 kg (224 lb).              Disposition Plan      Expected Discharge Date: 12/29/2023                The patient's care was discussed with the Attending Physician, Dr. Godinez .    Chris Garvey PA-C  Hospitalist Service, [facility]  Securely message with [facility] (more info)  Text page via [facility] Paging/Directory   See signed in provider for up to date coverage information    ______________________________________________________________________    Chief Complaint "   High BP, headache, burred vision    History is obtained from the patient and EMR.    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 He is currently undergoing treatment with brigatinib and bevacizumab c/b worsening hypertension related specifically to the bevacizumab (Avastin). BP medications have been uptritrated outpatient, however when he arrived at infusion center on 12/27/23 SBP noted to in the 250s and was sent emergently to ED; admitted for hypertensive urgency presumed related to chemotherapy, also patient reporting blurred vision for past 2 weeks.    Patient reports blurred vision for the past ~2 weeks, can't read his medication pill bottles anymore. No eye pain, nausea. No fevers, chills, chest pain/pressure, shortness of breath, vomiting, abdominal pain, urinary complaints, numbness, tingling. Was having a headache earlier today. BP noted to be very elevated at the infusion center and was sent to ED for further evaluation.    Past Medical History    Past Medical History:   Diagnosis Date    Atypical chest pain 12/02/2013    Cancer (H)     Complication of anesthesia     Diabetes (H)     GERD (gastroesophageal reflux disease) 12/02/2013    History of pulmonary embolism     HTN (hypertension) 05/14/2012    HTN, goal below 140/90 07/02/2013    Insomnia 02/21/2012    Mediastinal lymphadenopathy     Migraine headache 07/02/2013    Migraine with aura, without mention of intractable migraine without mention of status migrainosus     Pneumonia        Past Surgical History   Past Surgical History:   Procedure Laterality Date    BRONCHOSCOPY RIGID  OR FLEXIBLE W/TRANSENDOSCOPIC ENDOBRONCHIAL ULTRASOUND GUIDED Bilateral 1/26/2022    Procedure: Right BRONCHOSCOPY, FIBEROPTIC, endobronchial ultrasound, pleural biopsy;  Surgeon: Dallin Agrawal MD;  Location: [facility] OR    INJECT BLOCK MEDIAL BRANCH CERVICAL/THORACIC/LUMBAR      INSERT CHEST TUBE Right 2/16/2022    Procedure: INSERTION, CATHETER, INTERCOSTAL, FOR DRAINAGE;  Surgeon: Dallin Agrawal MD;  Location: [facility] GI    INSERT CHEST TUBE Right 3/9/2022    Procedure: INSERTION, CATHETER, INTERCOSTAL, FOR DRAINAGE;  Surgeon: Sushila Antonio MD;  Location: [facility] GI    IR CHEST TUBE REMOVAL TUNNELED RIGHT  4/2/2022    OPTICAL TRACKING SYSTEM CRANIOTOMY, EXCISE TUMOR, COMBINED Left 6/28/2022    Procedure: Left stealth craniotomy for tumor resection with motor mapping;  Surgeon: Stephen Moran MD;  Location:  OR    OPTICAL TRACKING SYSTEM CRANIOTOMY, EXCISE TUMOR, COMBINED Right 6/27/2023    Procedure: Right stealth craniotomy and resection of tumor;  Surgeon: Stephen Moran MD;  Location:  OR    ORTHOPEDIC SURGERY      Ganesh. Rotator cuff repair.    PLEUROSCOPY N/A 1/26/2022    Procedure: Pleuroscopy with Pleural Biopsy;  Surgeon: Dallin Agrawal MD;  Location: [facility] OR       Prior to Admission Medications   Prior to Admission Medications   Prescriptions Last Dose Informant Patient Reported? Taking?   Alcohol Swabs PADS  Spouse/Significant Other No Yes   Sig: Use to swab the area of the injection or jabier as directed. Per insurance coverage   Blood Glucose Monitoring Suppl (ACCU-CHEK GUIDE) w/Device KIT   Yes Yes   DULoxetine (CYMBALTA) 30 MG capsule 12/26/2023 at pm  Yes Yes   Sig: Take 30 mg by mouth 2 times daily   acetaminophen (TYLENOL) 325 MG tablet Past Month at Unknown  Yes Yes   Sig: Take 325-650 mg by mouth every 6 hours as needed for mild pain   albuterol (PROAIR HFA/PROVENTIL HFA/VENTOLIN HFA) 108 (90 Base) MCG/ACT inhaler Past Month at Unknown Spouse/Significant Other No Yes   Sig: Inhale 2 puffs  into the lungs every 6 hours as needed for shortness of breath, wheezing or cough   baclofen (LIORESAL) 10 MG tablet More than a month at Unknown  No Yes   Sig: Take 0.5-1 tablets (5-10 mg) by mouth 3 times daily as needed for other (hiccups)   blood glucose (NO BRAND SPECIFIED) lancets standard  Spouse/Significant Other No Yes   Sig: To use to test glucose level in the blood Use to test blood sugar  4  times daily as directed. To accompany glucose monitor brands per insurance coverage.   blood glucose (NO BRAND SPECIFIED) test strip  Spouse/Significant Other No Yes   Sig: To use to test glucose level in the blood Use to test blood sugar  4 times daily as directed. To accompany glucose monitor brands per insurance coverage.   brigatinib (ALUNBRIG) 30 MG TABS tablet 12/27/2023 at Unknown  No Yes   Sig: Take 4 tablets (120 mg) by mouth daily May be taken with or without food. Swallow whole. Do not crush or chew tablets.   hydrALAZINE (APRESOLINE) 25 MG tablet 12/27/2023 at am  Yes Yes   Sig: Take 25 mg by mouth daily   hydrochlorothiazide (HYDRODIURIL) 50 MG tablet 12/27/2023 at am  No Yes   Sig: Take 1 tablet (50 mg) by mouth daily   insulin aspart (NOVOLOG PEN) 100 UNIT/ML pen 12/26/2023 at Unknown Spouse/Significant Other No Yes   Sig: Inject 0-40 Units Subcutaneous 3 times daily (before meals) Per discharge instructions sliding scale   insulin glargine (LANTUS PEN) 100 UNIT/ML pen 12/26/2023 at am  Yes Yes   Sig: Inject 71 Units Subcutaneous every morning   insulin pen needle (32G X 4 MM) 32G X 4 MM miscellaneous   No Yes   Sig: Use as directed by provider. Per insurance coverage   levETIRAcetam (KEPPRA) 1000 MG tablet 12/27/2023 at am  No Yes   Sig: Take 1 tablet (1,000 mg) by mouth 2 times daily   lidocaine-prilocaine (EMLA) 2.5-2.5 % external cream Past Month at Unknown  No Yes   Sig: Apply topically as needed (pain due to port access) Apply to port 30 minutes prior to lab appointment.   lisinopril (ZESTRIL) 40  MG tablet 12/27/2023 at am  No Yes   Sig: Take 1 tablet (40 mg) by mouth daily   methadone (DOLOPHINE) 5 MG tablet 12/27/2023 at am  Yes Yes   Sig: Take 5 mg by mouth 2 times daily   naloxone (NARCAN) 4 MG/0.1ML nasal spray  Spouse/Significant Other No Yes   Sig: Spray 1 spray (4 mg) into one nostril alternating nostrils as needed for opioid reversal every 2-3 minutes until assistance arrives   oxyCODONE IR (ROXICODONE) 10 MG tablet Past Week at Unknown  No Yes   Sig: Take 1 tablet (10 mg) by mouth every 3 hours as needed for moderate pain   propranolol (INDERAL) 20 MG tablet 12/27/2023 at am  No Yes   Sig: Take 2 tablets (40 mg) by mouth 3 times daily   rivaroxaban ANTICOAGULANT (XARELTO) 20 MG TABS tablet 12/26/2023 at pm  No Yes   Sig: Take 1 tablet (20 mg) by mouth daily (with dinner)      Facility-Administered Medications: None        Review of Systems    The 10 point Review of Systems is negative other than noted in the HPI or here.    Social History   I have reviewed this patient's social history and updated it with pertinent information if needed.  Social History     Tobacco Use    Smoking status: Never     Passive exposure: Never    Smokeless tobacco: Never   Vaping Use    Vaping Use: Never used   Substance Use Topics    Alcohol use: Yes     Alcohol/week: 0.0 standard drinks of alcohol     Comment: social    Drug use: No         Family History   I have reviewed this patient's family history and updated it with pertinent information if needed.  Family History   Problem Relation Age of Onset    Unknown/Adopted Mother     Uterine Cancer Mother     Unknown/Adopted Father     Unknown/Adopted Maternal Grandmother     Unknown/Adopted Maternal Grandfather     Stomach Cancer Maternal Grandfather     Unknown/Adopted Paternal Grandmother     Unknown/Adopted Paternal Grandfather     Melanoma Maternal Uncle          Allergies   Allergies   Allergen Reactions    Vicodin [Hydrocodone-Acetaminophen] Nausea and Vomiting and  GI Disturbance        Physical Exam   Vital Signs: Temp: 98.5  F (36.9  C) Temp src: Oral BP: (!) 178/130 Pulse: 72   Resp: 18 SpO2: 97 % O2 Device: None (Room air)    Weight: 224 lbs 0 oz    GENERAL: Alert and oriented x 3. Well nourished, well developed.  No acute distress.    HEENT: Normocephalic, atraumatic. Anicteric sclera. Mucous membranes moist.   CV: RRR. S1, S2. No murmurs appreciated.   RESPIRATORY: Effort normal on room air. Lungs CTAB with no wheezing, rales, or rhonchi.   GI: Abdomen soft and non distended, bowel sounds present x all 4 quadrants. No tenderness, rebound, or guarding.   NEUROLOGICAL: No focal deficits. Follows commands.  Strength 5/5 in upper and lower extremities.   MUSCULOSKELETAL: No joint swelling or tenderness. Moves all extremities.   EXTREMITIES: No gross deformities. No peripheral edema.   SKIN: Grossly warm, dry, and intact. No jaundice. No rashes.     Medical Decision Making       90 MINUTES SPENT BY ME on the date of service doing chart review, history, exam, documentation & further activities per the note.      Data   Imaging results reviewed over the past 24 hrs:   Recent Results (from the past 24 hour(s))   CT Head w/o Contrast    Narrative    CT HEAD W/O CONTRAST 12/27/2023 1:26 PM    History: headache, blurry vision, elevated BP   ICD-10:    Comparison: Brain MRI 10/25/2023, PET CT 12/5/2023, head CT 5/17/2023    Technique: Using multidetector thin collimation helical acquisition  technique, axial, coronal and sagittal CT images from the skull base  to the vertex were obtained without intravenous contrast.   (topogram) image(s) also obtained and reviewed.    Findings: Postsurgical changes of right frontal craniotomy for  resection of tumor in the right frontal lobe. There are hyperdensities  along the margin of the resection bed with corresponding similar  features on the MRI from 10/5/2023. Chronic punctate anterior left  frontal white matter calcification. Stable  surgical changes of left  parietal craniotomy and left frontal periventricular hypoattenuation  from prior biopsy.    Stable small area of hypoattenuation in the anterior-inferior left  frontal lobe. There is a tiny hyperdensity in the left parietal lobe  on series 3 image 14 which correlates with a small metastatic lesion  better delineated on the comparison MRI examination. Additional vague  hyperdensity in the right frontal lobe on series 3 image 14. Other  tiny metastatic lesions are not well seen on CT and also better  delineated on the comparison MRI examination.    There is no definite acute intracranial hemorrhage, mass effect, or  midline shift. Gray/white matter differentiation in both cerebral  hemispheres is preserved. Ventricles are proportionate to the cerebral  sulci. The basal cisterns are clear. The visualized portions of the  paranasal sinuses and mastoid air cells are clear.      Impression    Impression:  1. No acute intracranial pathology.   2. Small hyperdensity in the left parietal lobe correlates with a  known metastatic lesion. Multiple other known metastatic lesions are  not well evaluated on CT.  3. Stable changes of right frontal tumor resection.     I have personally reviewed the examination and initial interpretation  and I agree with the findings.    DENIZ BAILEY MD         SYSTEM ID:  [id number]   XR Chest 2 Views    Narrative    Exam: XR CHEST 2 VIEWS, 12/27/2023 2:57 PM    Indication: elevated BP, h/o lung CA    Comparison: 12/5/2023, 9/12/2023    Findings:   PA and lateral views of the chest. Trachea is midline. No pneumothorax  or pleural effusion. Streaky bibasilar opacities. No focal pulmonary  consolidations. Unremarkable cardiac silhouette and mediastinum. No  acute osseous abnormalities.      Impression    Impression: Bibasilar streaky opacities favor atelectasis versus  pulmonary edema/atypical infection. No confluent consolidation    I have personally reviewed the  examination and initial interpretation  and I agree with the findings.    DIAZ MURGUIA MD         SYSTEM ID:  [id number]     Recent Labs   Lab 12/27/23  1512 12/21/23  1550   WBC 10.7 9.9   HGB 16.3 15.1   MCV 89 88    195    144   POTASSIUM 3.8 3.7   CHLORIDE 104 106   CO2 25 29   BUN 13.6 13.3   CR 0.61* 0.61*   ANIONGAP 14 9   TORY 10.1* 9.1   * 113*   ALBUMIN 4.7 4.4   PROTTOTAL 7.5 6.7   BILITOTAL 0.5 0.5   ALKPHOS 93 78   ALT 15 12   AST 20 20   LIPASE  --  66*

## 2023-12-29 ENCOUNTER — TELEPHONE (OUTPATIENT)
Dept: ONCOLOGY | Facility: CLINIC | Age: 43
End: 2023-12-29
Payer: MEDICAID

## 2023-12-29 NOTE — DISCHARGE SUMMARY
"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    Weight as of this encounter: 101.6 kg (224 lb).       Follow-ups Needed After Discharge   -Follow-up with oncology in 1-2 weeks. Currently scheduled for 1/10/2024. Recommend close follow-up of blood pressures, monitoring of proteinura.    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 He is currently undergoing treatment with brigatinib and bevacizumab c/b worsening hypertension related specifically to the bevacizumab (Avastin). BP medications have been uptritrated outpatient, however when he arrived at [facility] center on 12/27/23 SBP noted to in the 250s and was sent emergently to ED; admitted for hypertensive urgency presumed related to chemotherapy, also patient reporting blurred vision for past 2 weeks.    Hypertensive Urgency  /130 on arrival to ED. Renal function, LFTs, CBC reassuring. " He received PO hydralazine 25mg x 1 and 5mg IV Metoprolol x 1 in ED with improvement in SBP to 177/134. His headache has resolved with better BP control, though blurred vision continued. CT Head wo revealed no acute intracranial abnormalities. Hydralazine was increased to 25 mg 3 times daily. BP improved to 120-140s/90-100s at time of discharge. Oncology team was consulted, who recommended stopping Avastin.  -Continue current home BP regimen: hydrochlorothiazide 50 mg daily, lisinopril 40 mg daily, propranolol 40 mg 3 times daily  -Increase hydralazine to 25 mg 3 times daily  -Patient encouraged to keep track of BP at home and bring numbers to clinic  -Consider addition of calcium channel blocker (amlodipine, nifedipine) for further control if needed, also consider spironolactone. Consider sleep study as outpatient if concerns for JERROD as contributing factor (patient does have trouble falling asleep, no snoring or nighttime awakenings)    Non-nephrotic range proteinuria  Urine protein:creatinine ratio elevated to 0.96.   -Consider recheck UPCR as outpatient  -If persistently elevated, renal US and/or nephrology referral    Blurred Vision  Hypertensive retinopathy  Patient reports blurred vision for the past 2 weeks that does not wax and wane. It has not improved with better BP control. Reports he has been unable to read pill bottles. No eye pain. CT Head wo in ED with no acute intracranial abnormality. Ophthalmology consulted with findings of dry eye and hypertensive retinopathy.  -Continue BP management as above  -Artificial tears 4 times daily prescribed  -If not improving with BP control, consider MR Brain    Stage IV NSCLC - R Lung Adenocarcinoma (mets pleural, mediastinum, R pleural effusion, brain)  Brain Mets - Biopsy w/ Radiation Necrosis  S/p R Stealth Craniotomy and Resection of Tumor (6/27/2023)    Persistent Headache  December PET showed some uptake in the lung, saw Dr Fu, not recommended biopsy. On  as of brigatinib 2/22/23, multiple different doses, currently at 120mg, most recent reduction (July of 2023) due to myalgias. Brain MRI with several brain mets, s/p GK to 11-12 brain mets. F/u Brain MRI in June 2022 was showing enlargement of the one of the lesions along with edema, therefore had to undergo craniotomy followed by resection, the final biopsy consistent with radiation necrosis. Currently on bevacizumab for radiation necrosis --15 mg/kg every 3 weeks. (Of note, Connor S/p 2 doses of Bevacixumab fall of 2022, stopped due HTN urgency and PE.) MRI in 4/2023 showed contrast enhancement of lesions and a dominate lesion in the Rfrontal region with edema, several other smaller lesion. Short term MRI showing increase in size of the rt frontal lesion, underwent resection, patho again showing radiation necrosis. Improved on October imaging. Last Avastin dose cycle 6, day 1 on 11/17/23.  -Hold avastin  -Continue PTA Brigatinib  -Continue PTA Keppra 1000mg BID for seizure prophylaxis  -Continue PTA Baclofen 5-10mg TID prn for hiccups    Consultations This Hospital Stay   ONCOLOGY ADULT IP CONSULT  OPHTHALMOLOGY IP CONSULT    Code Status   Full Code    Time Spent on this Encounter   I, Eliezer Angeles MD, personally saw the patient today and spent greater than 30 minutes discharging this patient.       Eliezer Angeles MD  [facility] EMERGENCY DEPARTMENT  [address] [address]  Phone: [phone]  ______________________________________________________________________    Physical Exam   Vital Signs: Temp: 98.2  F (36.8  C) Temp src: Oral BP: (!) 159/111 Pulse: 77   Resp: 18 SpO2: 96 % O2 Device: None (Room air)    Weight: 224 lbs 0 oz    General: appears comfortable, in no acute distress  HEENT: anicteric sclera, moist mucous membranes, unable to appreciate papilledema  Cardiovascular: regular rate and rhythm, no murmurs, 2+ distal pulses equal bilaterally  Respiratory: no increased work of  breathing, lungs clear to auscultation throughout, no wheezing or crackles  Abdomen: soft, non-distended, non-tender throughout  Extremities: No appreciable edema  Skin: no concerning rashes or lesions  Neuro: A&O x4, responds appropriately to exam       Primary Care Physician   Bassam Persaud    Discharge Orders      Reason for your hospital stay    You were hospitalized due to extremely high blood pressure and changes in vision. You were evaluated by the eye doctors and blurry vision is in part due to high blood pressure. One of your cancer therapies - Avastin - can contribute to high blood pressure so we are stopping this treatment for now. Continue your blood pressure medications as prescribed. Increase hydralazine to 3 times per day (ok to take with propranolol). Continue checking your blood pressure on a daily basis and bring your numbers to your next clinic visit to see if a dos increase or change in medications is needed.     Activity    Your activity upon discharge: activity as tolerated - no activity restrictions.     Adult [facility] Follow-up and recommended labs and tests    Follow up with oncology, Bassam Persaud, as scheduled on 1/10/2024 to evaluate medication change.      Appointments on [place] and/or [facility] (with [facility] or [facility] provider or service). Call [phone] if you haven't heard regarding these appointments within 7 days of discharge.     Diet    Follow this diet upon discharge: Regular Diet Adult       Significant Results and Procedures   Most Recent 3 CBC's:  Recent Labs   Lab Test 12/28/23  0755 12/27/23  1512 12/21/23  1550   WBC 11.5* 10.7 9.9   HGB 16.3 16.3 15.1   MCV 91 89 88    231 195     Most Recent 3 BMP's:  Recent Labs   Lab Test 12/28/23  1742 12/28/23  1352 12/28/23  0836 12/28/23  0755 12/27/23  2207 12/27/23  1512 12/21/23  1550   NA  --   --   --  139  --  143 144   POTASSIUM  --   --   --  3.8  --  3.8 3.7   CHLORIDE  --   --   --  102  --  104 106    CO2  --   --   --  26  --  25 29   BUN  --   --   --  15.7  --  13.6 13.3   CR  --   --   --  0.64*  --  0.61* 0.61*   ANIONGAP  --   --   --  11  --  14 9   TORY  --   --   --  9.3  --  10.1* 9.1   * 180* 185* 165*   < > 159* 113*    < > = values in this interval not displayed.     Most Recent Urinalysis:  Recent Labs   Lab Test 12/27/23  1538 09/28/22  1116 05/12/20  1005   COLOR Light Yellow   < > Yellow   APPEARANCE Clear   < > Clear   URINEGLC Negative   < > >=1000*   URINEBILI Negative   < > Negative   URINEKETONE Negative   < > 15*   SG 1.019   < > 1.010   UBLD Negative   < > Negative   URINEPH 5.5   < > 5.0   PROTEIN 100*   < > Negative   UROBILINOGEN  --   --  0.2   NITRITE Negative   < > Negative   LEUKEST Negative   < > Negative   RBCU 1   < >  --    WBCU 5   < >  --     < > = values in this interval not displayed.   ,   Results for orders placed or performed during the hospital encounter of 12/27/23   CT Head w/o Contrast    Narrative    CT HEAD W/O CONTRAST 12/27/2023 1:26 PM    History: headache, blurry vision, elevated BP   ICD-10:    Comparison: Brain MRI 10/25/2023, PET CT 12/5/2023, head CT 5/17/2023    Technique: Using multidetector thin collimation helical acquisition  technique, axial, coronal and sagittal CT images from the skull base  to the vertex were obtained without intravenous contrast.   (topogram) image(s) also obtained and reviewed.    Findings: Postsurgical changes of right frontal craniotomy for  resection of tumor in the right frontal lobe. There are hyperdensities  along the margin of the resection bed with corresponding similar  features on the MRI from 10/5/2023. Chronic punctate anterior left  frontal white matter calcification. Stable surgical changes of left  parietal craniotomy and left frontal periventricular hypoattenuation  from prior biopsy.    Stable small area of hypoattenuation in the anterior-inferior left  frontal lobe. There is a tiny hyperdensity in  the left parietal lobe  on series 3 image 14 which correlates with a small metastatic lesion  better delineated on the comparison MRI examination. Additional vague  hyperdensity in the right frontal lobe on series 3 image 14. Other  tiny metastatic lesions are not well seen on CT and also better  delineated on the comparison MRI examination.    There is no definite acute intracranial hemorrhage, mass effect, or  midline shift. Gray/white matter differentiation in both cerebral  hemispheres is preserved. Ventricles are proportionate to the cerebral  sulci. The basal cisterns are clear. The visualized portions of the  paranasal sinuses and mastoid air cells are clear.      Impression    Impression:  1. No acute intracranial pathology.   2. Small hyperdensity in the left parietal lobe correlates with a  known metastatic lesion. Multiple other known metastatic lesions are  not well evaluated on CT.  3. Stable changes of right frontal tumor resection.     I have personally reviewed the examination and initial interpretation  and I agree with the findings.    DENIZ BAILEY MD         SYSTEM ID:  [id number]   XR Chest 2 Views    Narrative    Exam: XR CHEST 2 VIEWS, 12/27/2023 2:57 PM    Indication: elevated BP, h/o lung CA    Comparison: 12/5/2023, 9/12/2023    Findings:   PA and lateral views of the chest. Trachea is midline. No pneumothorax  or pleural effusion. Streaky bibasilar opacities. No focal pulmonary  consolidations. Unremarkable cardiac silhouette and mediastinum. No  acute osseous abnormalities.      Impression    Impression: Bibasilar streaky opacities favor atelectasis versus  pulmonary edema/atypical infection. No confluent consolidation    I have personally reviewed the examination and initial interpretation  and I agree with the findings.    DIAZ MURGUIA MD         SYSTEM ID:  [id number]     *Note: Due to a large number of results and/or encounters for the requested time period, some results have not  been displayed. A complete set of results can be found in Results Review.       Discharge Medications   Discharge Medication List as of 12/28/2023  7:27 PM        START taking these medications    Details   dextran 70-hypromellose (TEARS NATURALE FREE PF) 0.1-0.3 % ophthalmic solution Place 1 drop into both eyes 4 times daily as needed, Disp-15 each, R-0, E-Prescribe           CONTINUE these medications which have CHANGED    Details   hydrALAZINE (APRESOLINE) 25 MG tablet Take 1 tablet (25 mg) by mouth 3 times daily, Disp-90 tablet, R-1, E-Prescribe           CONTINUE these medications which have NOT CHANGED    Details   acetaminophen (TYLENOL) 325 MG tablet Take 325-650 mg by mouth every 6 hours as needed for mild pain, Historical      albuterol (PROAIR HFA/PROVENTIL HFA/VENTOLIN HFA) 108 (90 Base) MCG/ACT inhaler Inhale 2 puffs into the lungs every 6 hours as needed for shortness of breath, wheezing or cough, Disp-18 g, R-0, E-PrescribePharmacy may dispense brand covered by insurance (Proair, or proventil or ventolin or generic albuterol inhaler)      Alcohol Swabs PADS Use to swab the area of the injection or jabier as directed. Per insurance coverage, Disp-100 each, R-0, E-Prescribe      baclofen (LIORESAL) 10 MG tablet Take 0.5-1 tablets (5-10 mg) by mouth 3 times daily as needed for other (hiccups), Disp-60 tablet, R-4, E-Prescribe      blood glucose (NO BRAND SPECIFIED) lancets standard To use to test glucose level in the blood Use to test blood sugar  4  times daily as directed. To accompany glucose monitor brands per insurance coverage.Disp-100 each, [id number]      blood glucose (NO BRAND SPECIFIED) test strip To use to test glucose level in the blood Use to test blood sugar  4 times daily as directed. To accompany glucose monitor brands per insurance coverage., Disp-100 strip, R-3, E-Prescribe      Blood Glucose Monitoring Suppl (ACCU-CHEK GUIDE) w/Device KIT Historical      brigatinib (ALUNBRIG) 30 MG  TABS tablet Take 4 tablets (120 mg) by mouth daily May be taken with or without food. Swallow whole. Do not crush or chew tablets., Disp-120 tablet, R-0, E-Prescribe      DULoxetine (CYMBALTA) 30 MG capsule Take 30 mg by mouth 2 times daily, Historical      hydrochlorothiazide (HYDRODIURIL) 50 MG tablet Take 1 tablet (50 mg) by mouth daily, Disp-30 tablet, R-1, E-Prescribe      insulin aspart (NOVOLOG PEN) 100 UNIT/ML pen Inject 0-40 Units Subcutaneous 3 times daily (before meals) Per discharge instructions sliding scale, Disp-45 mL, R-2, E-Prescribe      insulin glargine (LANTUS PEN) 100 UNIT/ML pen Inject 71 Units Subcutaneous every morning, Historical      insulin pen needle (32G X 4 MM) 32G X 4 MM miscellaneous Use as directed by provider. Per insurance coverageDisp-100 each, [id number]      levETIRAcetam (KEPPRA) 1000 MG tablet Take 1 tablet (1,000 mg) by mouth 2 times daily, Disp-60 tablet, R-11, E-Prescribe      lidocaine-prilocaine (EMLA) 2.5-2.5 % external cream Apply topically as needed (pain due to port access) Apply to port 30 minutes prior to lab appointment.Disp-30 g, [id number]      lisinopril (ZESTRIL) 40 MG tablet Take 1 tablet (40 mg) by mouth daily, Disp-90 tablet, R-1, E-Prescribe      methadone (DOLOPHINE) 5 MG tablet Take 5 mg by mouth 2 times daily, Historical      naloxone (NARCAN) 4 MG/0.1ML nasal spray Spray 1 spray (4 mg) into one nostril alternating nostrils as needed for opioid reversal every 2-3 minutes until assistance arrives, Disp-0.2 mL, R-3, E-Prescribe      oxyCODONE IR (ROXICODONE) 10 MG tablet Take 1 tablet (10 mg) by mouth every 3 hours as needed for moderate pain, Disp-90 tablet, R-0, E-Prescribe      propranolol (INDERAL) 20 MG tablet Take 2 tablets (40 mg) by mouth 3 times daily, Disp-180 tablet, R-1, E-Prescribe      rivaroxaban ANTICOAGULANT (XARELTO) 20 MG TABS tablet Take 1 tablet (20 mg) by mouth daily (with dinner), Disp-90 tablet, R-3, E-Prescribe            Allergies   Allergies   Allergen Reactions    Vicodin [Hydrocodone-Acetaminophen] Nausea and Vomiting and GI Disturbance

## 2023-12-29 NOTE — TELEPHONE ENCOUNTER
Chart notes with pt name, , provider name and name of medication.    Call reference:  [id number]    Juliette Marquez CPhT  [facility]  Oncology Pharmacy Liaison II  [email or web address]  [phone] (phone  [phone] (fax

## 2023-12-30 ENCOUNTER — PATIENT OUTREACH (OUTPATIENT)
Dept: CARE COORDINATION | Facility: CLINIC | Age: 43
End: 2023-12-30
Payer: MEDICAID

## 2023-12-30 NOTE — PROGRESS NOTES
CHW routed the following message to pt's care team , Bassam Persaud MD and Jayden King PA-C:     CHW contacted pt today for ED/Hospital discharge and follow-up as well as Clinic Care Coordination. Pt is out of his insulin glargine 100 UNIT/ML pen also known as: LANTUS PEN, and is requesting a refill. Please refill at:     [facility] in [place]   [id number] [place] [address] [place] [address] [phone]    Please contact pt at: [phone] (Home). Please advise.     CHW offered Clinic Care Coordination to an established [facility] eligible patient and patient declined CCC at this time.     Clinic Care Coordination Contact  [facility]: Post-Discharge Note  SITUATION                                                      Admission:    Admission Date: 12/27/23   Reason for Admission: Hypertension  Discharge:   Discharge Date: 12/28/23  Discharge Diagnosis: Hypertensive urgency, Primary malignant neoplasm of lung metastatic to other site, unspecified laterality (H)    BACKGROUND                                                      Per hospital discharge summary and inpatient provider notes:    Connor Emerson is a 43-year-old male with a history of metastatic lung cancer, with brain metastases, prior seizures, hypertension who presents to the emergency department from clinic due to elevated blood pressure.  He was in clinic today for his regular chemoinfusion.  In clinic he was incidentally noted to have elevated blood pressures.  He does report over the last 2 weeks he has had blurriness of his vision otherwise denies any symptoms.  Denies any headache, nausea, vomiting, numbness, tingling or weakness in his extremities, speech changes, dizziness, chest pain, shortness of breath, abdominal pain, changes in urine output or lower extremity swelling.  He has been compliant with his home antihypertensives.  He reports that his last chemotherapy he was noted to have elevated blood pressures and was  "given hydralazine.  He has not yet started this medication.  His last chemo was held due to elevated blood pressures at that time.  He denies any recent changes in diet.  Denies any falls or head injuries.  Denies any recent fevers or other infectious symptoms.      ASSESSMENT      Discharge Assessment  How are you doing now that you are home?: \"I'm good. I'm doing well I ran out of my diabetic pens...the grey ones...that's the only thing I need.\"  How are your symptoms? (Red Flag symptoms escalate to triage hotline per guidelines): Improved  Do you feel your condition is stable enough to be safe at home until your provider visit?: Yes  Does the patient have their discharge instructions? : Yes  Does the patient have questions regarding their discharge instructions? : No  Were you started on any new medications or were there changes to any of your previous medications? : Yes  Does the patient have all of their medications?: No (see comment) (Pt is requesting a refill of his insulin glargine 100 UNIT/ML pen  Also known as: LANTUS PEN)  Do you have questions regarding any of your medications? : No  Do you have all of your needed medical supplies or equipment (DME)?  (i.e. oxygen tank, CPAP, cane, etc.): Yes  Discharge follow-up appointment scheduled within 14 calendar days? : Yes  Discharge Follow Up Appointment Date: 01/10/24  Discharge Follow Up Appointment Scheduled with?: Specialty Care Provider (Oncology appt.)    Post-op (CHW CTA Only)  If the patient had a surgery or procedure, do they have any questions for a nurse?: No    PLAN                                                      Outpatient Plan:     Adult [facility] Follow-up and recommended labs and tests  Follow up with oncology, Bassam Persaud, as scheduled on 1/10/2024  to evaluate medication change.    Appointments on [place] and/or [facility] (with [facility] or  [facility] provider or service). Call [phone] if you haven't heard  regarding these " appointments within 7 days of discharge.    Future Appointments   Date Time Provider Department Center   1/10/2024  7:45 AM  [facility] LAB DRAW [facility]   1/10/2024  8:15 AM Chelsea Villegas CNP [facility]   1/10/2024  9:00 AM UC ONC INFUSION NURSE [facility]   1/29/2024 12:45 PM [email or web address] [facility]   1/30/2024  8:30 AM  [facility] LAB DRAW [facility]   1/30/2024  9:00 AM Chelsea Villegas CNP [facility]   1/30/2024 10:00 AM  ONC INFUSION NURSE [facility]   2/15/2024  9:50 AM Ajith Brewer MD [facility]   2/21/2024 12:00 PM  [facility] LAB DRAW [facility]   2/21/2024 12:30 PM Chelsea Villegas CNP [facility]   2/21/2024  1:30 PM UC ONC INFUSION NURSE [facility]   3/11/2024  1:20 PM [id number] RHSCCT [facility]   3/13/2024 11:30 AM  [facility] LAB DRAW [facility]   3/13/2024 12:00 PM Bassam Persaud MD [facility]   3/13/2024 12:30 PM UC ONC INFUSION NURSE [facility]         For any urgent concerns, please contact our 24 hour nurse triage line: [phone] ([phone])         LAURENCE Hernandez  [phone]  [facility]

## 2024-01-01 ENCOUNTER — MYC REFILL (OUTPATIENT)
Dept: RADIATION ONCOLOGY | Facility: HOSPITAL | Age: 44
End: 2024-01-01
Payer: MEDICAID

## 2024-01-01 DIAGNOSIS — D49.6 BRAIN TUMOR (H): ICD-10-CM

## 2024-01-01 DIAGNOSIS — Z98.890 S/P CRANIOTOMY: ICD-10-CM

## 2024-01-02 RX ORDER — OXYCODONE HYDROCHLORIDE 10 MG/1
10 TABLET ORAL
Qty: 90 TABLET | Refills: 0 | Status: SHIPPED | OUTPATIENT
Start: 2024-01-02 | End: 2024-01-11

## 2024-01-02 NOTE — TELEPHONE ENCOUNTER
Received [employer] message from patient requesting refill of oxycodone.     Last refill: 12/19/23  Last office visit: 12/19/23  Scheduled for follow up 2/15/24     Will route request to MD for review.     Reviewed [place]  Report.

## 2024-01-03 NOTE — TELEPHONE ENCOUNTER
This has not been coming from us.  I will have triage reach out to see who is refilling and managing this for him so that he gets what he needs.  BLANKA

## 2024-01-03 NOTE — TELEPHONE ENCOUNTER
No PCP listed other than Bassam Persaud MD who states he does not manage patient's insulin. Refusing medication, advising patient contact clinic.     Donovan FAITH RN 1/3/2024 at 1:14 PM

## 2024-01-04 ENCOUNTER — TELEPHONE (OUTPATIENT)
Dept: ONCOLOGY | Facility: CLINIC | Age: 44
End: 2024-01-04
Payer: MEDICAID

## 2024-01-04 NOTE — TELEPHONE ENCOUNTER
Sent [employer] message to patient concerning contacting prescriber of medication.    Pennie Rabago RN

## 2024-01-04 NOTE — ORAL ONC MGMT
Oral Chemotherapy Monitoring Program     Placed call to patient in follow up of brigatinib and recent blood pressures in the context of recent admission. Phone rang and mailbox full, so unable to leave voicemail. Patient is being seen on 1/10 and was instructed to bring blood pressure readings to that visit, so we will follow up at that time for any updates to his treatment plan.    Madelyn Jerry, PharmD, BCOP  Oral Chemotherapy Monitoring Program  [facility]  [phone]

## 2024-01-10 ENCOUNTER — PATIENT OUTREACH (OUTPATIENT)
Dept: ONCOLOGY | Facility: CLINIC | Age: 44
End: 2024-01-10
Payer: MEDICAID

## 2024-01-10 NOTE — TELEPHONE ENCOUNTER
[facility]: Cancer Care                                                                                          Pt returned call to writer regarding cancelling his labs and Avastin infusion this morning.. He stated he was told he would no longer be receiving Avastin because of his high blood pressure. He presented to [facility] ER 12/27-12/28 with headache and BP in the 180s/130s.     Stated he discharged on home BP meds: lisinipril 40 mg daily, hydralazine 25 mg TID, hydrochlorothiazide 50 mg TID and propranolol 80 mg BID. Taking daily BP, 1/9 he was still getting high readings 170/115 but state he has not had a headache or issues with vision since he was seen. He is asymptomatic but agreeable to a visit sooner than 1/30 if care team feels he needs it. Stated Rx for dextran 70 eye drops were not covered by insurance so he bought OTC Refresh to use instead.    Advised pt to continue BP monitoring and send readings to care team via ePatientFinder. Reviewed Stella REYES's last message to him to check BP twice a day. As he is taking hydralazine prn scheduled now and still having high readings, will discuss with provider whether he should change anything at this time. He confirmed he has continued taking Brigatinib daily.    Signature:  Harjinder Tapia RN

## 2024-01-10 NOTE — TELEPHONE ENCOUNTER
Per Stella Navarro PA: ok to continue monitoring BP readings at home, cancel all Avastin infusions and keep MRI/labs/follow-up visit with Chelsea KIM.

## 2024-01-11 ENCOUNTER — MYC REFILL (OUTPATIENT)
Dept: ONCOLOGY | Facility: CLINIC | Age: 44
End: 2024-01-11
Payer: MEDICAID

## 2024-01-11 ENCOUNTER — MYC REFILL (OUTPATIENT)
Dept: RADIATION ONCOLOGY | Facility: HOSPITAL | Age: 44
End: 2024-01-11
Payer: MEDICAID

## 2024-01-11 DIAGNOSIS — Z98.890 S/P CRANIOTOMY: ICD-10-CM

## 2024-01-11 DIAGNOSIS — C34.90 NON-SMALL CELL LUNG CANCER, UNSPECIFIED LATERALITY (H): Primary | ICD-10-CM

## 2024-01-11 DIAGNOSIS — D49.6 BRAIN TUMOR (H): ICD-10-CM

## 2024-01-11 DIAGNOSIS — G89.3 CANCER ASSOCIATED PAIN: ICD-10-CM

## 2024-01-11 DIAGNOSIS — C79.31 MALIGNANT NEOPLASM METASTATIC TO BRAIN (H): ICD-10-CM

## 2024-01-12 ENCOUNTER — MYC MEDICAL ADVICE (OUTPATIENT)
Dept: ONCOLOGY | Facility: CLINIC | Age: 44
End: 2024-01-12
Payer: MEDICAID

## 2024-01-12 DIAGNOSIS — E11.9 TYPE 2 DIABETES MELLITUS WITHOUT COMPLICATION, WITH LONG-TERM CURRENT USE OF INSULIN (H): Primary | ICD-10-CM

## 2024-01-12 DIAGNOSIS — Z79.4 TYPE 2 DIABETES MELLITUS WITHOUT COMPLICATION, WITH LONG-TERM CURRENT USE OF INSULIN (H): Primary | ICD-10-CM

## 2024-01-12 RX ORDER — OXYCODONE HYDROCHLORIDE 10 MG/1
10 TABLET ORAL
Qty: 90 TABLET | Refills: 0 | Status: SHIPPED | OUTPATIENT
Start: 2024-01-12 | End: 2024-01-22

## 2024-01-12 RX ORDER — METHADONE HYDROCHLORIDE 5 MG/1
5 TABLET ORAL 2 TIMES DAILY
Qty: 60 TABLET | Refills: 0 | Status: SHIPPED | OUTPATIENT
Start: 2024-01-12 | End: 2024-02-21

## 2024-01-12 NOTE — TELEPHONE ENCOUNTER
Received SocialStayt message from patient requesting refill of oxycodone.     Last refill: 1/2/24  Last office visit: 12/19/23  Scheduled for follow up 2/15/24     Will route request to MD for review.     Reviewed [place].    
Artificial joint

## 2024-01-12 NOTE — TELEPHONE ENCOUNTER
"Oncology Nurse Triage - Reporting Symptoms  Situation:   Connor reporting the following symptoms:    Background:   Treating Provider: Bassam Avilez MD  Date of last office visit: 12/22/23 with ERIC Monreal  Recent treatments: Yes: Bevacizumab    Assessment  Onset of symptoms: Yesterday  \"I could not believe how much blood was coming out of me.\" Pt describing blood in urine yesterday all day. States his underwear/boxers \"were soaked\" in blood and he had to throw them out.  Is on rivaroxiban 20 mg daily. He takes it at 10 a.m. every day.  Nose bleeding started at 5:30 a.m. and has continued. It has slowed down, but still bleeding. Pt has not pinched his nose to stop the bleeding, but states when he touches his nose, it starts to bleed again.   Feeling lethargic and slow. He is at work currently.  He denies blood in stool, bleeding gums, bruising.    Recommendations:   Advised pt go to ED for evaluation.  Hold rivaroxiban this a.m. until evaluated in ED.  Pt voiced understanding of this information and is in agreement with this plan of care. He wants to go to the [facility] ED in [place]. Informed him that this writer would call and give report to Charge Nurse. Pt voiced understanding. He has a .     0815: Called ED and gave report to Efrem Charge Nurse    Routed to Care Team.  "

## 2024-01-12 NOTE — TELEPHONE ENCOUNTER
Received ClearContextt message from patient requesting refill of methadone.     Last refill: 12/15/23  Last office visit: 12/19/23  Scheduled for follow up 2/15/24     Will route request to MD for review.     Reviewed [place]  Report.

## 2024-01-12 NOTE — CONFIDENTIAL NOTE
Methadone Refill   Last prescribing provider: Patient Reported     Last clinic visit date: 12/22/23 Sarina Navarro     Recommendations for requested medication (if none, N/A): Copied from chart note   12/22/23 Sarina Navarro   -He follows with palliative for pain control. Taking methadone 1 in the AM and 1 PM, oxycodone 2 at lunch and 2 bedtime. He cut out the morning oxycodone to see if he would feel less tired. He uses celebrex in the AM.     Any other pertinent information (if none, N/A): N/A    Refilled: Y/N, if NO, why?

## 2024-01-17 NOTE — TELEPHONE ENCOUNTER
Called the pt to advise of below.    He says he is seeing a lady in [place] - a diabetes expert.  He does not know what her name is.  He says he has been out of lantus insulin for 2 days.  He says he is taking 71 units daily.     Pt will call us back with the name of the diabetes expert.      Radha - when do you want to see him?  Not sure where to put him.  He says he can come at any time.  He is becoming upset.  He says he has cancer and has 5 doctors.      A prescription for lantus insulin was sent in per written order below.

## 2024-01-17 NOTE — TELEPHONE ENCOUNTER
Can we check in with the pt.  Is he seeing endocrinology?  What is his current dose?  I want to make sure that he has his medication but haven't seen him in awhile and so I am not sure what his numbers are running.  It is listed at 71 units so if this is correct please refill and schedule follow up.    BLANKA

## 2024-01-18 NOTE — TELEPHONE ENCOUNTER
If the diabetes expert he is seeing in [place] is managing his diabetes, she should fill his insulin from now on since I have not seen him for this recently.  Okay that we refilled now, but further refills should come from her.  BLANKA

## 2024-01-22 ENCOUNTER — MYC REFILL (OUTPATIENT)
Dept: RADIATION ONCOLOGY | Facility: HOSPITAL | Age: 44
End: 2024-01-22
Payer: MEDICAID

## 2024-01-22 DIAGNOSIS — D49.6 BRAIN TUMOR (H): ICD-10-CM

## 2024-01-22 DIAGNOSIS — Z98.890 S/P CRANIOTOMY: ICD-10-CM

## 2024-01-22 RX ORDER — OXYCODONE HYDROCHLORIDE 10 MG/1
10 TABLET ORAL
Qty: 90 TABLET | Refills: 0 | Status: SHIPPED | OUTPATIENT
Start: 2024-01-22 | End: 2024-02-03

## 2024-01-22 NOTE — TELEPHONE ENCOUNTER
Received [employer]t message from patient requesting refill of oxycodone.     Last refill: 1/12/24  Last office visit: 12/19/23  Scheduled for follow up 2/15/24     Will route request to MD for review.     Reviewed [place]  Report.

## 2024-01-24 ENCOUNTER — TELEPHONE (OUTPATIENT)
Dept: ONCOLOGY | Facility: CLINIC | Age: 44
End: 2024-01-24
Payer: MEDICAID

## 2024-01-24 NOTE — TELEPHONE ENCOUNTER
EMELY OSPINA PMAP max of 6 per day. Looks like patient could possibly take 8 tabs per day.     Wondering if you would like to do a prior auth.   Prior Authorization Retail Medication Request    Medication/Dose: Oxycodone 10mg  Diagnosis and ICD code (if different than what is on RX):    New/renewal/insurance change PA/secondary ins. PA:  Previously Tried and Failed:    Rationale:      Insurance   Primary: BCVINH OSPINA PMAP  Insurance ID:  [id number]    Pharmacy Information (if different than what is on RX)  Zoe Fernandez CPhT  [facility]  [id number] [place] [address] [phone]

## 2024-01-24 NOTE — TELEPHONE ENCOUNTER
Retail Pharmacy Prior Authorization Team   Phone: [phone]    PA Initiation    Medication: OXYCODONE HCL 10 MG PO TABS  Insurance Company: [facility] - Phone [phone] Fax [phone]  Pharmacy Filling the Rx: [place], [place] - [address]  Filling Pharmacy Phone: [phone]  Filling Pharmacy Fax:    Start Date: 1/24/2024

## 2024-01-25 DIAGNOSIS — I10 PRIMARY HYPERTENSION: ICD-10-CM

## 2024-01-25 DIAGNOSIS — C34.31 MALIGNANT NEOPLASM OF LOWER LOBE OF RIGHT LUNG (H): Primary | ICD-10-CM

## 2024-01-25 RX ORDER — LISINOPRIL 40 MG/1
40 TABLET ORAL DAILY
Qty: 90 TABLET | Refills: 1 | Status: ON HOLD | OUTPATIENT
Start: 2024-01-25 | End: 2024-07-25

## 2024-01-25 NOTE — TELEPHONE ENCOUNTER
Prior Authorization Approval    Medication: OXYCODONE HCL 10 MG PO TABS  Authorization Effective Date: 1/24/2024  Authorization Expiration Date: 7/24/2024  Approved Dose/Quantity:   Reference #:     Insurance Company: Blue Plus PMAP - Phone [phone] Fax [phone]  Expected CoPay: $    CoPay Card Available:      Financial Assistance Needed:   Which Pharmacy is filling the prescription: [facility] [place], [place] - [address] [address]  Pharmacy Notified: YES  Patient Notified:

## 2024-01-25 NOTE — TELEPHONE ENCOUNTER
Lisinopril Refill   Last prescribing provider: Amber Scheierl     Last clinic visit date: 12/22/23 Sarina Navarro     Recommendations for requested medication (if none, N/A): Copied from chart note   12/22/23 Sarina Navarro   - continue propanolol to 80 BID, 50 mg of hydrochlorothiazide and lisinopril 40 mg daily. Will also start hydralazine.     Any other pertinent information (if none, N/A): N/A    Refilled: Y/N, if NO, why?

## 2024-01-29 ENCOUNTER — HOSPITAL ENCOUNTER (OUTPATIENT)
Dept: MRI IMAGING | Facility: CLINIC | Age: 44
Discharge: HOME | End: 2024-01-29
Attending: STUDENT IN AN ORGANIZED HEALTH CARE EDUCATION/TRAINING PROGRAM | Admitting: STUDENT IN AN ORGANIZED HEALTH CARE EDUCATION/TRAINING PROGRAM
Payer: COMMERCIAL

## 2024-01-29 DIAGNOSIS — C34.31 MALIGNANT NEOPLASM OF LOWER LOBE OF RIGHT LUNG (H): ICD-10-CM

## 2024-01-29 PROCEDURE — A9585 GADOBUTROL INJECTION: HCPCS | Performed by: STUDENT IN AN ORGANIZED HEALTH CARE EDUCATION/TRAINING PROGRAM

## 2024-01-29 PROCEDURE — 70553 MRI BRAIN STEM W/O & W/DYE: CPT

## 2024-01-29 PROCEDURE — 255N000002 HC RX 255 OP 636: Performed by: STUDENT IN AN ORGANIZED HEALTH CARE EDUCATION/TRAINING PROGRAM

## 2024-01-29 RX ORDER — GADOBUTROL 604.72 MG/ML
10 INJECTION INTRAVENOUS ONCE
Status: COMPLETED | OUTPATIENT
Start: 2024-01-29 | End: 2024-01-29

## 2024-01-29 RX ADMIN — GADOBUTROL 10 ML: 604.72 INJECTION INTRAVENOUS at 12:11

## 2024-02-03 ENCOUNTER — MYC REFILL (OUTPATIENT)
Dept: RADIATION ONCOLOGY | Facility: HOSPITAL | Age: 44
End: 2024-02-03
Payer: MEDICAID

## 2024-02-03 DIAGNOSIS — Z98.890 S/P CRANIOTOMY: ICD-10-CM

## 2024-02-03 DIAGNOSIS — D49.6 BRAIN TUMOR (H): ICD-10-CM

## 2024-02-05 RX ORDER — OXYCODONE HYDROCHLORIDE 10 MG/1
10 TABLET ORAL
Qty: 90 TABLET | Refills: 0 | Status: SHIPPED | OUTPATIENT
Start: 2024-02-05 | End: 2024-02-14

## 2024-02-05 NOTE — TELEPHONE ENCOUNTER
Received ONDiGO Mobile CRMt message from patient requesting refill of oxycodone.     Last refill: 1/25/24  Last office visit: 12/19/23  Scheduled for follow up 2/15/24     Will route request to MD for review.     Reviewed [place].

## 2024-02-09 ENCOUNTER — TELEPHONE (OUTPATIENT)
Dept: ONCOLOGY | Facility: CLINIC | Age: 44
End: 2024-02-09
Payer: MEDICAID

## 2024-02-09 DIAGNOSIS — C34.31 MALIGNANT NEOPLASM OF LOWER LOBE OF RIGHT LUNG (H): ICD-10-CM

## 2024-02-09 NOTE — TELEPHONE ENCOUNTER
FREE DRUG APPLICATION INITIATED    Medication: ALUNBRIG 30 MG PO TABS  Free Drug Program Name:  [employer]  Date Submitted: 2/9/2024  4:18 PM    Additional Information: Currently Connor is without insurance coverage and in the process of obtaining new coverage.           Juliette Marquez [facility]  Oncology Pharmacy Liaison II  [email or web address]  [phone] (phone  [phone] (fax

## 2024-02-14 ENCOUNTER — MYC REFILL (OUTPATIENT)
Dept: RADIATION ONCOLOGY | Facility: HOSPITAL | Age: 44
End: 2024-02-14
Payer: MEDICAID

## 2024-02-14 DIAGNOSIS — Z98.890 S/P CRANIOTOMY: ICD-10-CM

## 2024-02-14 DIAGNOSIS — D49.6 BRAIN TUMOR (H): ICD-10-CM

## 2024-02-14 RX ORDER — OXYCODONE HYDROCHLORIDE 10 MG/1
10 TABLET ORAL
Qty: 90 TABLET | Refills: 0 | Status: SHIPPED | OUTPATIENT
Start: 2024-02-15 | End: 2024-02-21

## 2024-02-14 NOTE — TELEPHONE ENCOUNTER
Received Ev[employer] message from patient requesting refill of oxycodone.     Last refill: 2/5/24  Last office visit: 12/19/23  Scheduled for follow up pending. Pt cancelled visit scheduled for 2/15 due to not having insurance. He should have it again within a few weeks and will call to schedule the visit.     Will route request to MD for review.     Reviewed [place].

## 2024-02-21 ENCOUNTER — MYC REFILL (OUTPATIENT)
Dept: RADIATION ONCOLOGY | Facility: HOSPITAL | Age: 44
End: 2024-02-21
Payer: MEDICAID

## 2024-02-21 ENCOUNTER — MYC REFILL (OUTPATIENT)
Dept: ONCOLOGY | Facility: CLINIC | Age: 44
End: 2024-02-21
Payer: MEDICAID

## 2024-02-21 DIAGNOSIS — C79.31 MALIGNANT NEOPLASM METASTATIC TO BRAIN (H): ICD-10-CM

## 2024-02-21 DIAGNOSIS — C34.90 NON-SMALL CELL LUNG CANCER, UNSPECIFIED LATERALITY (H): ICD-10-CM

## 2024-02-21 DIAGNOSIS — G89.3 CANCER ASSOCIATED PAIN: ICD-10-CM

## 2024-02-21 DIAGNOSIS — D49.6 BRAIN TUMOR (H): ICD-10-CM

## 2024-02-21 DIAGNOSIS — Z98.890 S/P CRANIOTOMY: ICD-10-CM

## 2024-02-22 ENCOUNTER — MYC MEDICAL ADVICE (OUTPATIENT)
Dept: ONCOLOGY | Facility: CLINIC | Age: 44
End: 2024-02-22

## 2024-02-22 ENCOUNTER — PATIENT OUTREACH (OUTPATIENT)
Dept: ONCOLOGY | Facility: CLINIC | Age: 44
End: 2024-02-22
Payer: MEDICAID

## 2024-02-22 DIAGNOSIS — R51.9 NONINTRACTABLE EPISODIC HEADACHE, UNSPECIFIED HEADACHE TYPE: ICD-10-CM

## 2024-02-22 RX ORDER — OXYCODONE HYDROCHLORIDE 10 MG/1
10 TABLET ORAL
Qty: 90 TABLET | Refills: 0 | Status: SHIPPED | OUTPATIENT
Start: 2024-02-26 | End: 2024-03-05

## 2024-02-22 RX ORDER — METHADONE HYDROCHLORIDE 5 MG/1
5 TABLET ORAL 2 TIMES DAILY
Qty: 60 TABLET | Refills: 0 | Status: SHIPPED | OUTPATIENT
Start: 2024-02-22 | End: 2024-03-19

## 2024-02-22 NOTE — TELEPHONE ENCOUNTER
Received [employer] message from patient requesting refill of oxycodone.     Last refill: 2/15/24  Last office visit: 12/19/23  Scheduled for follow up tentatively planned for 2/27     Will route request to MD for review.     Reviewed [place]  Report.

## 2024-02-22 NOTE — PROGRESS NOTES
"Called Pt to discuss missed appt yesterday. Pt states he didn't miss due to illness, but due to being currently uninsured. He says he has a  via [employer] but \"cannot even get a good phone # for anyone there\". He's out of insulin and having ongoing cough issues and is unwilling to be seen which would create more debt. Notified care team and asked SW if there was any aid we could render.    Called Pt to relay, Per Caty DENNY, that \"I checked the state database and he has active MA for insurance. ID# [id number]. From what I can tell, there should be no concerns. I do see that it is set to end on 3/31, but maybe the paperwork that is pending is to renew it past March.\" The #'s I located for [employer] MA assistance online are [phone] & [phone]. Will also send Picarro with this info.  Pt verbalized understanding.    "

## 2024-02-22 NOTE — TELEPHONE ENCOUNTER
Received L[employer] Mobilet message from patient requesting refill of methadone.     Last refill: 1/12/24  Last office visit: 12/19/23  Scheduled for follow up tentatively planned for 2/27.     Will route request to MD for review.     Reviewed [place]  Report.

## 2024-02-23 NOTE — TELEPHONE ENCOUNTER
Juliann Lopez (gabriel) : Enrollment Status still pending - [employer] unable to reach Connor.  Voicemail box is full -sent my chart message for him to call Juliann.    Juliette Marquez CPhT  [facility]  Oncology Pharmacy Liaison II  [email or web address]  [phone] (phone  [phone] (fax

## 2024-02-26 ENCOUNTER — ONCOLOGY VISIT (OUTPATIENT)
Dept: ONCOLOGY | Facility: CLINIC | Age: 44
End: 2024-02-26
Attending: STUDENT IN AN ORGANIZED HEALTH CARE EDUCATION/TRAINING PROGRAM
Payer: MEDICAID

## 2024-02-26 ENCOUNTER — APPOINTMENT (OUTPATIENT)
Dept: LAB | Facility: CLINIC | Age: 44
End: 2024-02-26
Payer: MEDICAID

## 2024-02-26 VITALS
TEMPERATURE: 98.1 F | WEIGHT: 216.3 LBS | HEART RATE: 72 BPM | RESPIRATION RATE: 16 BRPM | SYSTOLIC BLOOD PRESSURE: 149 MMHG | BODY MASS INDEX: 31.94 KG/M2 | OXYGEN SATURATION: 96 % | DIASTOLIC BLOOD PRESSURE: 91 MMHG

## 2024-02-26 DIAGNOSIS — I10 HTN, GOAL BELOW 140/90: ICD-10-CM

## 2024-02-26 DIAGNOSIS — C79.31 MALIGNANT NEOPLASM METASTATIC TO BRAIN (H): ICD-10-CM

## 2024-02-26 DIAGNOSIS — E83.39 HYPOPHOSPHATEMIA: ICD-10-CM

## 2024-02-26 DIAGNOSIS — R73.9 HYPERGLYCEMIA: ICD-10-CM

## 2024-02-26 DIAGNOSIS — C34.31 MALIGNANT NEOPLASM OF LOWER LOBE OF RIGHT LUNG (H): Primary | ICD-10-CM

## 2024-02-26 PROCEDURE — 99215 OFFICE O/P EST HI 40 MIN: CPT | Performed by: NURSE PRACTITIONER

## 2024-02-26 PROCEDURE — G0463 HOSPITAL OUTPT CLINIC VISIT: HCPCS | Performed by: NURSE PRACTITIONER

## 2024-02-26 ASSESSMENT — PAIN SCALES - GENERAL: PAINLEVEL: WORST PAIN (10)

## 2024-02-26 NOTE — NURSING NOTE
"Oncology Rooming Note    February 26, 2024 10:21 AM   Connor Emerson is a 43 year old male who presents for:    Chief Complaint   Patient presents with    Oncology Clinic Visit     Non-small cell lung cancer     Initial Vitals: BP (!) 149/91 (BP Location: Left arm, Patient Position: Sitting, Cuff Size: Adult Regular)   Pulse 72   Temp 98.1  F (36.7  C) (Oral)   Resp 16   Wt 98.1 kg (216 lb 4.8 oz)   SpO2 96%   BMI 31.94 kg/m   Estimated body mass index is 31.94 kg/m  as calculated from the following:    Height as of 12/27/23: 1.753 m (5' 9\").    Weight as of this encounter: 98.1 kg (216 lb 4.8 oz). Body surface area is 2.19 meters squared.  Worst Pain (10) Comment: when coughing/sneezing (2 when sitting)   No LMP for male patient.  Allergies reviewed: Yes  Medications reviewed: Yes    Medications: Medication refills not needed today.  Pharmacy name entered into EPIC:    [facility] [facility]  [facility] [place], [place] - [address]  [facility] [place], [place] - [address] [place] [address]  [facility] [address] [facility] [place], [place] - [address] [place] [address] PHARMACY [place], [place] - [address] [facility] [phone]  [facility] - [place], [place] - [address] [place] [address] 158    Frailty Screening:   Is the patient here for a new oncology consult visit in cancer care? 2. No      Clinical concerns: When patient sneezes or coughs, pain on the right side of their ribs is sharp and extreme. Pain makes it really difficult for them to sneeze/cough, they have to hold it in because they are in so much pain they can't cough/sneeze. They've been told in the past that the pain is due to scar tissue in the area due to their condition in the past, but they don't think this is the case. Right side pain near ribs have been ongoing for months, but within the last month has gotten worse, " they have to use more strength to hold their side when they cough/sneeze.       Yaritza Mohr

## 2024-02-26 NOTE — LETTER
2/26/2024         RE: Connor Emerson  [address]  [facility] [address]        Dear Colleague,    Thank you for referring your patient, Connor Emerson, to the [facility]. Please see a copy of my visit note below.        MEDICAL ONCOLOGY FOLLOW UP NOTE    PATIENT NAME: Connor Emerson  ENCOUNTER DATE: February 26, 2024    Care Team  Primary Oncologist: Bassam Persaud MD    REASON FOR CURRENT VISIT: F/u of lung cancer    HISTORY OF PRESENT ILLNESS:  Mr. Connor Emerson is a 43 year old  male who is a non-smoker with PMHx of T2DM, HTN with metastatic NSCLC comes for follow up     Oncologic Hx:    Diagnosis:   Stage IV NSCLC, Rt lung adenocarcinoma with metastasis to pleura, mediastinum , rt pleural effusion and brain diagnosed 1/2022 (AJCC 8th edition)  PD-L1 TPS 2-3% by [place]   NGS [facility] panel-EML4:ALK rearragement  NGS [facility]- GNAS R201H, KRAS K5E- No ALK    Treatment:   2/23/2022- current: Brigatinib. Dose reduced to 60 mg due to cough after two days of 90 mg daily -->back on 90 mg---> increased to 180 mg  ---> settled on 120 mg    6/27/23- Right stealth craniotomy and resection of tumor:     7/20/23- 11/14/24: Bevacizumab for radiation necrosis. Discontinued due to severe HTN.      )  Past:  2/15/22- GK to 11 brain lesions  3/2/22- 12/2022 Alectinib 300 mg BID (Dose reduced to 450 mg BID from 600 mg BID due to grade 3 myalgias 3/21/22, again reduced to 300 mg BID 9/28/22)  6/28/22- Craniotomy, resection  9/28/22-10/26- Bevacizumab for radiation necrosis (stopped due to PE)      Intent of treatment: Palliative    Oncologic course:  1/19/22 to 1/22/22-Admitted to [facility] for 2 week progressive SOB secondary to have large rt sided pleural effusion, needing thoracentesis x2 (1.7L and 2.0 L removed), cytology positive for malignancy, adenocarcinoma.   1/26/22- Rt pleural mass biopsy-Dr. Agrawal--POSITIVE FOR ADENOCARCINOMA CONSISTENT WITH LUNG PRIMARY, admixed with mesothelial  hyperplasia and inflammatory infiltrate (+ TTF-1 and CK 7;  negative  p40, calretinin and WT-1. PAX8 immunostain focal +). 4th thoracentesis done simultaneously - 3L approx removed.   2/1/22- PET/CT-Right lower lobe central infiltrative FDG avid 8.2 x 9.6 cm mass representing a primary lung adenocarcinoma. Ipsilateral right perihilar, bilateral pretracheal, subcarinal and superior mediastinal michele metastases. Contralateral mildly FDG avid few lung nodules are suspicious for contralateral metastasis. At least 3 intracranial metastases in the right frontal lobe, left frontal lobe and left cerebellar hemisphere. Nonspecific mild diffuse bone marrow uptake. Further evaluation with a spine MRI could be considered to rule out early marrow infiltration. This could also be seen with red marrow conversion.  2/5/22-  Brain MRI- At least 9 intracranial metastases as detailed above. The dominant lesions involving the orbital right frontal lobe, the posterior left middle frontal gyrus, anterior right temporal lobe and in the left cerebellar hemisphere have surrounding moderate vasogenic type edema.  2/15/22- Saw Dr. Arango from Rad Onc- Rcd GK to 12 lesion in bran  2/16/22- Pleurex placement   3/2/22- Started Alectinib 600 mg BID  3/21/22- Dose reduced to 450 mg BID due to grade 3 myalgias and fatigue  4/2/22 to 4/5/22- Admitted at [facility] for- Severe sepsis due to MSSA infection of right PleurX catheter s/p removal- He presented with onset of pain at tube site starting 4/1; at arrival was tachycardic with leukocytosis (22.7) and elevated lactic acid (2.9).  CT chest showed fluid and stranding tracking outside the pleural space into chest wall along pleural catheter.  IR was consulted and removed catheter 4/2 with report of pustular drainage and tip culture growing MSSA.  Thoracic Surg was consulted who felt no surgical indication necessary given minimal pleural fluid and lack of any signs of abscess.  Initially treated  with broad spectrum coverage for sepsis, narrowed to Ancef once sensitivities returned with plan to transition to cefadroxil for an additional 10 days at discharge per ID. Held drug 4/2 to 4/11 5/2/22- CT CAP- Overall, positive response to therapy with decreased size of right lower lobe and right pleural-based masses, pulmonary metastases, hilar and mediastinal lymphadenopathy. However, a single right posterior pleural-based mass has slightly increased in size since 2/24/2022. No metastatic disease in the abdomen and pelvis. Right Pleurx catheter has been removed. Trace right pleural effusion and right basilar atelectasis.  5/2/22- Brain MRI- The previously demonstrated brain metastases are mildly diminished in size versus to 2/5/2022. The degree of edema is also diminished but not completely resolved. Probable trace amounts of intralesional bleeding demonstrated on the gradient sequence within the metastases. No definite new metastasis or progressive mass effect. No hydrocephalus or infarct.    6/15/22 to 6/17/22- Admitted at [facility]-with aphasia and word finding difficulty over last few weeks.  He presented to [facility] ED on 6/10 for evaluation of his symptoms. MRI brain showed multiple intracranial metastases, with interval enlargement of the dominant lesion within the left frontal lobe and increased surrounding vasogenic edema with 2 mm rightward shift of the septum pellucidum. Due to his worsening anxiety, he left AMA. His symptoms continued to progress to where he could not write at work so he decided to go to the ED for re-evaluation and treatment. Evaluated by NSGY, Rad Onc (radiaiton necrosis vs tumor progression).  6/16/22- MR Brain (6/16) shows multiple intracranial metastases, with interval enlargement  of the dominant lesion within the left frontal lobe and increased surrounding vasogenic edema with 2 mm rightward shift of the septum pellucidum.  6/16/22- - CT CAP shows slightly decreased size of right  lower lobe and right pleural-based masses. No new pulmonary nodules or lymphadenopathy; No evidence of metastatic disease in the abdomen or pelvis.   6/28/22 to 6/30/22- Admitted at [facility]- Elective left Stealth craniotomy with resection of brain tumor due to ongoing symptoms. No intraoperative complications. EBL 50 ml.  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  7/5/22- Ct CAP- Right lower lobe low-density nodules are not significantly changed. A small left upper lobe pulmonary nodule is also unchanged. Trace pleural fluid on the right has increased slightly. No convincing evidence for metastatic disease in the abdomen or pelvis.  7/18/22 to 7/19/22- Admitted to [facility] for seizure- Reportedly was only taking once Keppra instead of twice daily. Also resumed on dexamethasone 2 mg daily  8/1/22- Brain MRI- Redemonstrated postsurgical changes status post left frontoparietal Craniotomy. Interval increase in size of the dominant ring-enhancing lesion in the left posterior superior frontal lobe with increased moderate surrounding vasogenic edema and local mass effect resulting in narrowing of the supratentorial ventricular system. No significant midline shift/herniation at this time    8/1/22- Dex was increased to 4 mg daily by Dr. Moran    9/1/22- CT Chest- Near resolution of previously seen right pleural nodule. Stable right lower lobe pulmonary nodule    9/28/22- Bevacizumab for radiation necrosis    10/26/22- Bevacizumab     10/26/22- Ct CAP- Stable posterior medial right lower lobe 1.9 x 1.1 cm nodule series 8 image 176. Adjacent stable scarring and atelectasis. The previously noted pleural nodule posteriorly on the right is not currently clearly identified. Stable left upper lobe 0.3 cm nodule image 56    10/26/22- Brain MRI- Overall improved appearance of multiple intracranial metastases with near resolution of associated edema and diminished enhancement and size of multiple residual lesions. No definite  new or progressive metastasis.    11/7/22 to 11/9/22- Admitted for PE and HTN urgency- Small pulmonary embolism in the right lower lobe pulmonary artery. started on Lovenox, Brain MRI neg for PRES.    12/29/22- ED visit- bilateral hip pain, pain in shoulders, knuckles, knees, and ankles- holding alectinib since 12/20/22 1/6/23- Ct CAP- Mild groundglass nodularity in the left upper lobe is new since the previous exam, and may be infectious in etiology. No other significant interval change. Pulmonary nodules are not significantly changed.    1/6/23- Brain MRI- Stable to diminished sequelae of intracranial metastasis and treatment changes. No new or progressive metastasis. No superimposed acute intracranial finding.     2/23/2022- Start Brigatinib. Dose reduced to 60 mg due to cough after two days of 90 mg daily -  3/23/23-Brigatinib  (increase to 90 mg)    4/20/23- CT CAP- Stable- Previously noted mild groundglass nodularity in the left upper lobe has resolved.Pulmonary nodules are unchanged.Trace amount pleural fluid on the right has decreased slightly.    4/20/23- Brain MRI- Possile progression- Largest metastasis within the right frontal lobe has increased in size with worsening peripheral nodular enhancement and worsening vasogenic edema contributing to new right to left midline shift.  Multiple new/enlarging metastases scattered throughout the cerebral hemispheres and cerebellum. Started on dexamethasone by NGS on 4/28/23 5/17/23- presents to clinic with glucose 627, admission to hospital for stability on insulin drip    6/16/23- Brain MRI- Interval increase in size of the necrotic lesion in the anterior aspect of the right frontal lobe from 2.4 x 2.3 x 2.4 cm previously to 3.0 x 3.5 x 2.8 cm on the current study. Mass effect due to slightly increased vasogenic edema surrounding the right frontal lesion resulted in increase of leftward midline shift of the anterior interhemispheric fissure from 0.7 cm  previously to 0.9 cm currently. Interval increase in size in two adjacent metastases at the frontoparietal junction on the left. The remaining scattered intra-axial enhancing nodules are not changed appreciably in size or appearance since the comparison study.No evidence for acute intracranial pathology.   Dr. Moran- Due to worsening headaches and more problems with walking. Recommended a right Stealth craniotomy for resection of the lesion.     6/27/23- Right stealth craniotomy and resection of tumor: Final path: radiation necrosis    7/10/23- Ct CAP- stable Stable exam without evidence for new disease.Stable pulmonary nodules including a dominant nodular opacity at the right lower lobe.    7/20/23- Bevacizumab for radiation necrosis    8/16/23- Bevacizumab     9/12/23- Ct CAP-  Stable right lower lobe dominant nodular opacity. No new disease in the chest, abdomen and pelvis.     9/15, 10/6, 10/27, 11/17-  Bevacizumab    10/25/23- MRI Brain- 1. Redemonstrated postoperative changes of right frontal craniotomy. Decreased size of the right frontal resection cavity with significant decrease in the surrounding right frontal lobe vasogenic edema seen on the previous study. Mass effect has essentially resolved in the interim and there is no longer any midline shift. In the interim, slightly increased thickness of a rind of peripheral nodular enhancement along the posterior inferior and medial aspects of the right frontal lobe resection cavity, indeterminate. There is no significant elevated cerebral blood volume in this area. The findings may represent evolving posttreatment changes; however, residual tumor is not excluded.  Stable postoperative changes status post left anterior parietal craniotomy with irregular enhancement in the left frontal lobe parenchyma underlying the resection cavity, likely due to posttreatment change. Other scattered supratentorial and infratentorial metastatic lesions are overall similar to  slightly decreased in size, as described.    12/5/23- PET/CT- with sole site of disease in the RLL measuring 1.6 x 1.4 cm and with SUV max of 4.2. No other sites of disease. His case was reviewed at tumor board and he met with Dr. Fu 12/14/24 with recommendations against surgical resection due to risk of significant pleural scarring. Continued on brigatinib 120 mg daily.     1/29/24 Brain MRI: No new metastatic lesions. No significant change in supratentorial and infratentorial  subcentimeter metastatic enhancing lesions. Stable right inferior frontal and left high frontal postsurgical  changes.     12/22-current: lost to follow up due to insurance issues      Interval Hx:    He is generally doing reasonably well per his report. He got something in the mail last month that led him to believe he did not have insurance coverage, he since has been able to confirm he does actually have some coverage. He denies any lapse of brigatinib during this time.     His myalgias remain very stable/tolerable on the 120 mg dosing. He notes right pleuretic pain with coughing or sneezing. He denies new cough/sob. He continues to work. Fatigues easily and goes to bed early. No new neurological concerns. He does not check his BP or BS at home. Reports he has been out of insulin for one month.      ECOG PS 0-1    REVIEW OF SYSTEMS: 14 point ROS negative other than the symptoms noted above in the HPI.    Wt Readings from Last 4 Encounters:   02/26/24 98.1 kg (216 lb 4.8 oz)   12/27/23 101.6 kg (224 lb)   12/27/23 99.4 kg (219 lb 1.6 oz)   12/22/23 97.1 kg (214 lb)      Review of Systems:  A comprehensive ROS was performed and found to be negative or non-contributory with the exception of that noted in the HPI above.    Past Medical History:  GERD  Hypertension, not on medication  Type 2 diabetes mellitus, not on medications currently, previously on Metformin    Past Surgical History:  Past Surgical History:   Procedure Laterality  Date    BRONCHOSCOPY RIGID OR FLEXIBLE W/TRANSENDOSCOPIC ENDOBRONCHIAL ULTRASOUND GUIDED Bilateral 2022    Procedure: Right BRONCHOSCOPY, FIBEROPTIC, endobronchial ultrasound, pleural biopsy;  Surgeon: Dallin Agrawal MD;  Location: [facility] OR    INJECT BLOCK MEDIAL BRANCH CERVICAL/THORACIC/LUMBAR      INSERT CHEST TUBE Right 2022    Procedure: INSERTION, CATHETER, INTERCOSTAL, FOR DRAINAGE;  Surgeon: Dallin Agrawal MD;  Location: [facility] GI    INSERT CHEST TUBE Right 3/9/2022    Procedure: INSERTION, CATHETER, INTERCOSTAL, FOR DRAINAGE;  Surgeon: Sushila Antonio MD;  Location: [facility] GI    IR CHEST TUBE REMOVAL TUNNELED RIGHT  2022    OPTICAL TRACKING SYSTEM CRANIOTOMY, EXCISE TUMOR, COMBINED Left 2022    Procedure: Left stealth craniotomy for tumor resection with motor mapping;  Surgeon: Stephen Moran MD;  Location:  OR    OPTICAL TRACKING SYSTEM CRANIOTOMY, EXCISE TUMOR, COMBINED Right 2023    Procedure: Right stealth craniotomy and resection of tumor;  Surgeon: Stephen Moran MD;  Location:  OR    ORTHOPEDIC SURGERY      Ganesh. Rotator cuff repair.    PLEUROSCOPY N/A 2022    Procedure: Pleuroscopy with Pleural Biopsy;  Surgeon: Dallin Agrawal MD;  Location:  OR       Social History:  Lives with wife and 4 kids in [place]. Works as a  for an apartment complex in [place]. Exposure to household chemicals and . No significant exposure to asbestos. No signal exposure to benzene or similar chemicals. No significant smoking history-states that he smoked 1 to 2 cigarettes occasionally per month for about 2 years in college, non-smoking since then. No significant alcohol use history. No other recreational substances. Good support system. Kids are 23, 19, 17 and 13.    Family History  Significant history for cancers on maternal side. Mother  of uterine cancer. 2 maternal uncles have possible metastatic melanoma.    Outpatient Medications:  Current  Outpatient Medications   Medication    acetaminophen (TYLENOL) 325 MG tablet    albuterol (PROAIR HFA/PROVENTIL HFA/VENTOLIN HFA) 108 (90 Base) MCG/ACT inhaler    Alcohol Swabs PADS    baclofen (LIORESAL) 10 MG tablet    blood glucose (NO BRAND SPECIFIED) lancets standard    blood glucose (NO BRAND SPECIFIED) test strip    Blood Glucose Monitoring Suppl (ACCU-CHEK GUIDE) w/Device KIT    brigatinib (ALUNBRIG) 30 MG TABS tablet    DULoxetine (CYMBALTA) 30 MG capsule    hydrALAZINE (APRESOLINE) 25 MG tablet    hydrochlorothiazide (HYDRODIURIL) 50 MG tablet    insulin aspart (NOVOLOG PEN) 100 UNIT/ML pen    insulin glargine (LANTUS PEN) 100 UNIT/ML pen    insulin pen needle (32G X 4 MM) 32G X 4 MM miscellaneous    levETIRAcetam (KEPPRA) 1000 MG tablet    lidocaine-prilocaine (EMLA) 2.5-2.5 % external cream    lisinopril (ZESTRIL) 40 MG tablet    methadone (DOLOPHINE) 5 MG tablet    naloxone (NARCAN) 4 MG/0.1ML nasal spray    oxyCODONE IR (ROXICODONE) 10 MG tablet    propranolol (INDERAL) 20 MG tablet    rivaroxaban ANTICOAGULANT (XARELTO) 20 MG TABS tablet     No current facility-administered medications for this visit.     BP (!) 149/91 (BP Location: Left arm, Patient Position: Sitting, Cuff Size: Adult Regular)   Pulse 72   Temp 98.1  F (36.7  C) (Oral)   Resp 16   Wt 98.1 kg (216 lb 4.8 oz)   SpO2 96%   BMI 31.94 kg/m    General: No acute distress  HEENT: Sclera anicteric. Oral mucosa pink and moist.  No mucositis or thrush  Heart: Regular, rate, and rhythm  Lungs: Clear to ascultation bilaterally. Breathing comfortably  Abdomen: Positive bowel sounds. Soft, non-distended, non-tender.   Extremities: no lower extremity edema  Neuro: Cranial nerves grossly intact  Rash: none      Labs & Studies: I personally reviewed the following studies:  Most Recent 3 CBC's:  Recent Labs   Lab Test 12/28/23  0755 12/27/23  1512 12/21/23  1550   WBC 11.5* 10.7 9.9   HGB 16.3 16.3 15.1   MCV 91 89 88    231 195     Most  Recent 3 BMP's:  Recent Labs   Lab Test 12/28/23  1742 12/28/23  1352 12/28/23  0836 12/28/23  0755 12/27/23  2207 12/27/23  1512 12/21/23  1550   NA  --   --   --  139  --  143 144   POTASSIUM  --   --   --  3.8  --  3.8 3.7   CHLORIDE  --   --   --  102  --  104 106   CO2  --   --   --  26  --  25 29   BUN  --   --   --  15.7  --  13.6 13.3   CR  --   --   --  0.64*  --  0.61* 0.61*   ANIONGAP  --   --   --  11  --  14 9   TORY  --   --   --  9.3  --  10.1* 9.1   * 180* 185* 165*   < > 159* 113*    < > = values in this interval not displayed.    Most Recent 2 LFT's:  Recent Labs   Lab Test 12/28/23  0755 12/27/23  1512   AST 20 20   ALT 13 15   ALKPHOS 83 93   BILITOTAL 0.6 0.5    Most Recent TSH and T4:  Recent Labs   Lab Test 09/12/22  1642   TSH 2.56        ASSESSMENT AND PLAN:  Stage IV NSCLC, Rt lung adenocarcinoma with metastasis to pleura, mediastinum , rt pleural effusion and brain diagnosed 1/2022 (AJCC 8th edition)  PD-L1 TPS 2-3% by [place]   NGS [facility] panel-EML4:ALK rearragement; chr2:[id number], chr2:[id number]  NGS [facility]- GNAS R201H, KRAS K5E- No ALK    He began Alectinib 600 mg BID 3/2/22 and unfortunately developed grade 3 myalgias which have improved with lowering the dose 450 mg BID. He was holding drug 4/2/22 to 4/11/22 due to MSSA infection from pleurex which is removed. Resumed at 450 mg BID. Due to ongoing myalgias (Although CK is normal), we dose reduced to 300 mg BID.   In Dec 2022, developed Gr 3 arthralgia, and we stopped drug 12/20/22. Had unremitting arthalgias, eventully had to starte PO steroids which led to improvement and resolved. Radiographicaly, he has had a near CR to Rx in the lung, has a residual rt LL lesion.     Began brigatinib 2/22/23 (delayed due to pt hesitancy). Developed severe cough on day 2 so brigatinib was held and cough resolved with in 24-48 hours. He restarted 60 mg daily and upped it to 90 mg.Restging CT showing stable disease in the lung, however, MRI  with several contrast enhancement and increase in size of previously treated lesions. His dose was increased to 180 mg daily to address possible CNS disease progression and started on dexamethasone. 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 CT in July 20123 showing stable disease.   Following surgery, we reduced to 120 mg/day due to worsening joint aches/stiffness. Restaging CT in 9/2023 showing overall disease stablity with no evidence of active cancer. We opted to continue Brigatinib at 120 mg and restage in 3 months. PET/CT after these three months showed oligoperisstent disease with a single focus of active malignancy in the RLL. Met with pulm 12/2023 to discuss resection but they recommended against it due to risk for scarring after. I do not see that he met with radiation but this can be revisited pending upcoming scans, as appropriate.     Plan  - Continue brigatinib at 120 mg  - CT mid March and follow up with Dr. Persaud as scheduled  - will request 6 week video follow up with me to follow, adjust as indicated    # Brain mets:   # Radiation necrosis, improving  -Baseline Brain MRI with several brain mets, s/p GK to 11-12 brain mets. F/u Brain MRI in June 2022 was showing enlargement of the one of the lesions along with edema, therefore had to undergo craniotomy followed by resection, the final biopsy consistent with radiation necrosis.   -received two doses bevacizumab for radiation necrosis in Fall 2022, tolerated poorly with HTN urgency and PE.)   -MRI in 4/2023 now showing contrast enhancement of lesions and a dominate lesion in the R frontal region with edema, several other smaller lesion. Short term MRI showing increase in size of the rt frontal lesion, underwent resection, patho again showing radiation necrosis. Restarted bevacizumab, which resulted in improved radiation necrosis / vasogenic edema on imaging in 10/2023 but ultimately was  stopped due to uncontrolled HTN, last dose being 11/2023  -MRI imaging 1/2024 with stable disease and no edema.     #right pleuritic/chest pain  Felt to be 2/2 prior pleurex drain. No acute resp changes today.      #Elevated Lipase  -mild elevation. No concern on exam     #Diabetes, Type 2  Hyperglycemia today on labs. He will reach out to PCP to get refill of insulin     #PE: provoked by malignancy vs bevacizumab in 11/2022  -- on rivaroxiabn 20 mg daily    #Grade 2-3 arthralgias  All joints affected, no morning stiffness, normal ESR and CRP  -following with palliative; Dr. Brewer  -on methadone BID;Celecoxib, oxycodone     #Hematuria: c/o this again via mychart in December. resolved per his report     #MSSA infection, Sepsis at pleurex site- resolved  # Pleural effusion: s/p pleurex palcement  2/16/22. Then on 4/2 right PleurX catheter s/p removal for severe sepsis due to MSSA infection.    #HTN: elevated today but much improved since HTN urgency levels while he was on Fe. Asked he check a couple times per week at home to re-establish trend. Confirmed he has not had a lapse of his HTN meds with recent insurance coverage lapse.     #hypophos: recheck in two weeks. Given >2 will hold off on replacement.     60 minutes spent on the date of the encounter doing chart review, review of test results, interpretation of tests, patient visit, documentation, discussion with other provider(s), and discussion with family     Chelsea Villegas CNP on 2/26/2024 at 12:37 PM

## 2024-02-26 NOTE — PROGRESS NOTES
MEDICAL ONCOLOGY FOLLOW UP NOTE    PATIENT NAME: Connor Emerson  ENCOUNTER DATE: February 26, 2024    Care Team  Primary Oncologist: Bassam Persaud MD    REASON FOR CURRENT VISIT: F/u of lung cancer    HISTORY OF PRESENT ILLNESS:  Mr. Connor Emerson is a 43 year old  male who is a non-smoker with PMHx of T2DM, HTN with metastatic NSCLC comes for follow up     Oncologic Hx:    Diagnosis:   Stage IV NSCLC, Rt lung adenocarcinoma with metastasis to pleura, mediastinum , rt pleural effusion and brain diagnosed 1/2022 (AJCC 8th edition)  PD-L1 TPS 2-3% by [place]   NGS [facility] panel-EML4:ALK rearragement  NGS [facility]- GNAS R201H, KRAS K5E- No ALK    Treatment:   2/23/2022- current: Brigatinib. Dose reduced to 60 mg due to cough after two days of 90 mg daily -->back on 90 mg---> increased to 180 mg  ---> settled on 120 mg    6/27/23- Right stealth craniotomy and resection of tumor:     7/20/23- 11/14/24: Bevacizumab for radiation necrosis. Discontinued due to severe HTN.      )  Past:  2/15/22- GK to 11 brain lesions  3/2/22- 12/2022 Alectinib 300 mg BID (Dose reduced to 450 mg BID from 600 mg BID due to grade 3 myalgias 3/21/22, again reduced to 300 mg BID 9/28/22)  6/28/22- Craniotomy, resection  9/28/22-10/26- Bevacizumab for radiation necrosis (stopped due to PE)      Intent of treatment: Palliative    Oncologic course:  1/19/22 to 1/22/22-Admitted to [facility] for 2 week progressive SOB secondary to have large rt sided pleural effusion, needing thoracentesis x2 (1.7L and 2.0 L removed), cytology positive for malignancy, adenocarcinoma.   1/26/22- Rt pleural mass biopsy-Dr. Agrawal--POSITIVE FOR ADENOCARCINOMA CONSISTENT WITH LUNG PRIMARY, admixed with mesothelial hyperplasia and inflammatory infiltrate (+ TTF-1 and CK 7;  negative  p40, calretinin and WT-1. PAX8 immunostain focal +). 4th thoracentesis done simultaneously - 3L approx removed.   2/1/22- PET/CT-Right lower lobe central infiltrative FDG avid 8.2 x 9.6 cm mass  representing a primary lung adenocarcinoma. Ipsilateral right perihilar, bilateral pretracheal, subcarinal and superior mediastinal michele metastases. Contralateral mildly FDG avid few lung nodules are suspicious for contralateral metastasis. At least 3 intracranial metastases in the right frontal lobe, left frontal lobe and left cerebellar hemisphere. Nonspecific mild diffuse bone marrow uptake. Further evaluation with a spine MRI could be considered to rule out early marrow infiltration. This could also be seen with red marrow conversion.  2/5/22-  Brain MRI- At least 9 intracranial metastases as detailed above. The dominant lesions involving the orbital right frontal lobe, the posterior left middle frontal gyrus, anterior right temporal lobe and in the left cerebellar hemisphere have surrounding moderate vasogenic type edema.  2/15/22- Saw Dr. Arango from [facility]- Rcd GK to 12 lesion in bran  2/16/22- Pleurex placement   3/2/22- Started Alectinib 600 mg BID  3/21/22- Dose reduced to 450 mg BID due to grade 3 myalgias and fatigue  4/2/22 to 4/5/22- Admitted at [facility] for- Severe sepsis due to MSSA infection of right PleurX catheter s/p removal- He presented with onset of pain at tube site starting 4/1; at arrival was tachycardic with leukocytosis (22.7) and elevated lactic acid (2.9).  CT chest showed fluid and stranding tracking outside the pleural space into chest wall along pleural catheter.  IR was consulted and removed catheter 4/2 with report of pustular drainage and tip culture growing MSSA.  Thoracic Surg was consulted who felt no surgical indication necessary given minimal pleural fluid and lack of any signs of abscess.  Initially treated with broad spectrum coverage for sepsis, narrowed to Ancef once sensitivities returned with plan to transition to cefadroxil for an additional 10 days at discharge per ID. Held drug 4/2 to 4/11 5/2/22- CT CAP- Overall, positive response to therapy with decreased  size of right lower lobe and right pleural-based masses, pulmonary metastases, hilar and mediastinal lymphadenopathy. However, a single right posterior pleural-based mass has slightly increased in size since 2/24/2022. No metastatic disease in the abdomen and pelvis. Right Pleurx catheter has been removed. Trace right pleural effusion and right basilar atelectasis.  5/2/22- Brain MRI- The previously demonstrated brain metastases are mildly diminished in size versus to 2/5/2022. The degree of edema is also diminished but not completely resolved. Probable trace amounts of intralesional bleeding demonstrated on the gradient sequence within the metastases. No definite new metastasis or progressive mass effect. No hydrocephalus or infarct.    6/15/22 to 6/17/22- Admitted at [facility]-with aphasia and word finding difficulty over last few weeks.  He presented to [facility] ED on 6/10 for evaluation of his symptoms. MRI brain showed multiple intracranial metastases, with interval enlargement of the dominant lesion within the left frontal lobe and increased surrounding vasogenic edema with 2 mm rightward shift of the septum pellucidum. Due to his worsening anxiety, he left AMA. His symptoms continued to progress to where he could not write at work so he decided to go to the ED for re-evaluation and treatment. Evaluated by NSGY, Rad Onc (radiaiton necrosis vs tumor progression).  6/16/22- MR Brain (6/16) shows multiple intracranial metastases, with interval enlargement  of the dominant lesion within the left frontal lobe and increased surrounding vasogenic edema with 2 mm rightward shift of the septum pellucidum.  6/16/22- - CT CAP shows slightly decreased size of right lower lobe and right pleural-based masses. No new pulmonary nodules or lymphadenopathy; No evidence of metastatic disease in the abdomen or pelvis.   6/28/22 to 6/30/22- Admitted at [facility]- Elective left Stealth craniotomy with resection of brain tumor due to ongoing  symptoms. No intraoperative complications. EBL 50 ml.  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  7/5/22- Ct CAP- Right lower lobe low-density nodules are not significantly changed. A small left upper lobe pulmonary nodule is also unchanged. Trace pleural fluid on the right has increased slightly. No convincing evidence for metastatic disease in the abdomen or pelvis.  7/18/22 to 7/19/22- Admitted to [facility] for seizure- Reportedly was only taking once Keppra instead of twice daily. Also resumed on dexamethasone 2 mg daily  8/1/22- Brain MRI- Redemonstrated postsurgical changes status post left frontoparietal Craniotomy. Interval increase in size of the dominant ring-enhancing lesion in the left posterior superior frontal lobe with increased moderate surrounding vasogenic edema and local mass effect resulting in narrowing of the supratentorial ventricular system. No significant midline shift/herniation at this time    8/1/22- Dex was increased to 4 mg daily by Dr. Moran    9/1/22- CT Chest- Near resolution of previously seen right pleural nodule. Stable right lower lobe pulmonary nodule    9/28/22- Bevacizumab for radiation necrosis    10/26/22- Bevacizumab     10/26/22- Ct CAP- Stable posterior medial right lower lobe 1.9 x 1.1 cm nodule series 8 image 176. Adjacent stable scarring and atelectasis. The previously noted pleural nodule posteriorly on the right is not currently clearly identified. Stable left upper lobe 0.3 cm nodule image 56    10/26/22- Brain MRI- Overall improved appearance of multiple intracranial metastases with near resolution of associated edema and diminished enhancement and size of multiple residual lesions. No definite new or progressive metastasis.    11/7/22 to 11/9/22- Admitted for PE and HTN urgency- Small pulmonary embolism in the right lower lobe pulmonary artery. started on Lovenox, Brain MRI neg for PRES.    12/29/22- ED visit- bilateral hip pain, pain in shoulders,  knuckles, knees, and ankles- holding alectinib since 12/20/22 1/6/23- Ct CAP- Mild groundglass nodularity in the left upper lobe is new since the previous exam, and may be infectious in etiology. No other significant interval change. Pulmonary nodules are not significantly changed.    1/6/23- Brain MRI- Stable to diminished sequelae of intracranial metastasis and treatment changes. No new or progressive metastasis. No superimposed acute intracranial finding.     2/23/2022- Start Brigatinib. Dose reduced to 60 mg due to cough after two days of 90 mg daily -  3/23/23-Brigatinib  (increase to 90 mg)    4/20/23- CT CAP- Stable- Previously noted mild groundglass nodularity in the left upper lobe has resolved.Pulmonary nodules are unchanged.Trace amount pleural fluid on the right has decreased slightly.    4/20/23- Brain MRI- Possile progression- Largest metastasis within the right frontal lobe has increased in size with worsening peripheral nodular enhancement and worsening vasogenic edema contributing to new right to left midline shift.  Multiple new/enlarging metastases scattered throughout the cerebral hemispheres and cerebellum. Started on dexamethasone by NGS on 4/28/23 5/17/23- presents to clinic with glucose 627, admission to hospital for stability on insulin drip    6/16/23- Brain MRI- Interval increase in size of the necrotic lesion in the anterior aspect of the right frontal lobe from 2.4 x 2.3 x 2.4 cm previously to 3.0 x 3.5 x 2.8 cm on the current study. Mass effect due to slightly increased vasogenic edema surrounding the right frontal lesion resulted in increase of leftward midline shift of the anterior interhemispheric fissure from 0.7 cm previously to 0.9 cm currently. Interval increase in size in two adjacent metastases at the frontoparietal junction on the left. The remaining scattered intra-axial enhancing nodules are not changed appreciably in size or appearance since the comparison study.No  evidence for acute intracranial pathology.   Dr. Moran- Due to worsening headaches and more problems with walking. Recommended a right Stealth craniotomy for resection of the lesion.     6/27/23- Right stealth craniotomy and resection of tumor: Final path: radiation necrosis    7/10/23- Ct CAP- stable Stable exam without evidence for new disease.Stable pulmonary nodules including a dominant nodular opacity at the right lower lobe.    7/20/23- Bevacizumab for radiation necrosis    8/16/23- Bevacizumab     9/12/23- Ct CAP-  Stable right lower lobe dominant nodular opacity. No new disease in the chest, abdomen and pelvis.     9/15, 10/6, 10/27, 11/17-  Bevacizumab    10/25/23- MRI Brain- 1. Redemonstrated postoperative changes of right frontal craniotomy. Decreased size of the right frontal resection cavity with significant decrease in the surrounding right frontal lobe vasogenic edema seen on the previous study. Mass effect has essentially resolved in the interim and there is no longer any midline shift. In the interim, slightly increased thickness of a rind of peripheral nodular enhancement along the posterior inferior and medial aspects of the right frontal lobe resection cavity, indeterminate. There is no significant elevated cerebral blood volume in this area. The findings may represent evolving posttreatment changes; however, residual tumor is not excluded.  Stable postoperative changes status post left anterior parietal craniotomy with irregular enhancement in the left frontal lobe parenchyma underlying the resection cavity, likely due to posttreatment change. Other scattered supratentorial and infratentorial metastatic lesions are overall similar to slightly decreased in size, as described.    12/5/23- PET/CT- with sole site of disease in the RLL measuring 1.6 x 1.4 cm and with SUV max of 4.2. No other sites of disease. His case was reviewed at tumor board and he met with Dr. Fu 12/14/24 with  recommendations against surgical resection due to risk of significant pleural scarring. Continued on brigatinib 120 mg daily.     1/29/24 Brain MRI: No new metastatic lesions. No significant change in supratentorial and infratentorial  subcentimeter metastatic enhancing lesions. Stable right inferior frontal and left high frontal postsurgical  changes.     12/22-current: lost to follow up due to insurance issues      Interval Hx:    He is generally doing reasonably well per his report. He got something in the mail last month that led him to believe he did not have insurance coverage, he since has been able to confirm he does actually have some coverage. He denies any lapse of brigatinib during this time.     His myalgias remain very stable/tolerable on the 120 mg dosing. He notes right pleuretic pain with coughing or sneezing. He denies new cough/sob. He continues to work. Fatigues easily and goes to bed early. No new neurological concerns. He does not check his BP or BS at home. Reports he has been out of insulin for one month.      ECOG PS 0-1    REVIEW OF SYSTEMS: 14 point ROS negative other than the symptoms noted above in the HPI.    Wt Readings from Last 4 Encounters:   02/26/24 98.1 kg (216 lb 4.8 oz)   12/27/23 101.6 kg (224 lb)   12/27/23 99.4 kg (219 lb 1.6 oz)   12/22/23 97.1 kg (214 lb)      Review of Systems:  A comprehensive ROS was performed and found to be negative or non-contributory with the exception of that noted in the HPI above.    Past Medical History:  GERD  Hypertension, not on medication  Type 2 diabetes mellitus, not on medications currently, previously on Metformin    Past Surgical History:  Past Surgical History:   Procedure Laterality Date    BRONCHOSCOPY RIGID OR FLEXIBLE W/TRANSENDOSCOPIC ENDOBRONCHIAL ULTRASOUND GUIDED Bilateral 1/26/2022    Procedure: Right BRONCHOSCOPY, FIBEROPTIC, endobronchial ultrasound, pleural biopsy;  Surgeon: Dallin Agrawal MD;  Location: [facility] OR    [facility]  BLOCK MEDIAL BRANCH CERVICAL/THORACIC/LUMBAR      INSERT CHEST TUBE Right 2022    Procedure: INSERTION, CATHETER, INTERCOSTAL, FOR DRAINAGE;  Surgeon: Dallin Agrawal MD;  Location: [facility] GI    INSERT CHEST TUBE Right 3/9/2022    Procedure: INSERTION, CATHETER, INTERCOSTAL, FOR DRAINAGE;  Surgeon: Sushila Antonio MD;  Location:  GI    IR CHEST TUBE REMOVAL TUNNELED RIGHT  2022    OPTICAL TRACKING SYSTEM CRANIOTOMY, EXCISE TUMOR, COMBINED Left 2022    Procedure: Left stealth craniotomy for tumor resection with motor mapping;  Surgeon: Stephen Moran MD;  Location:  OR    OPTICAL TRACKING SYSTEM CRANIOTOMY, EXCISE TUMOR, COMBINED Right 2023    Procedure: Right stealth craniotomy and resection of tumor;  Surgeon: Stephen Moran MD;  Location:  OR    ORTHOPEDIC SURGERY      Ganesh. Rotator cuff repair.    PLEUROSCOPY N/A 2022    Procedure: Pleuroscopy with Pleural Biopsy;  Surgeon: Dallin Agrawal MD;  Location:  OR       Social History:  Lives with wife and 4 kids in [place]. Works as a  for an [email or web address] complex in [place]. Exposure to household chemicals and . No significant exposure to asbestos. No signal exposure to benzene or similar chemicals. No significant smoking history-states that he smoked 1 to 2 cigarettes occasionally per month for about 2 years in college, non-smoking since then. No significant alcohol use history. No other recreational substances. Good support system. Kids are 23, 19, 17 and 13.    Family History  Significant history for cancers on maternal side. Mother  of uterine cancer. 2 maternal uncles have possible metastatic melanoma.    Outpatient Medications:  Current Outpatient Medications   Medication    acetaminophen (TYLENOL) 325 MG tablet    albuterol (PROAIR HFA/PROVENTIL HFA/VENTOLIN HFA) 108 (90 Base) MCG/ACT inhaler    Alcohol Swabs PADS    baclofen (LIORESAL) 10 MG tablet    blood glucose (NO BRAND SPECIFIED)  lancets standard    blood glucose (NO BRAND SPECIFIED) test strip    Blood Glucose Monitoring Suppl (ACCU-CHEK GUIDE) w/Device KIT    brigatinib (ALUNBRIG) 30 MG TABS tablet    DULoxetine (CYMBALTA) 30 MG capsule    hydrALAZINE (APRESOLINE) 25 MG tablet    hydrochlorothiazide (HYDRODIURIL) 50 MG tablet    insulin aspart (NOVOLOG PEN) 100 UNIT/ML pen    insulin glargine (LANTUS PEN) 100 UNIT/ML pen    insulin pen needle (32G X 4 MM) 32G X 4 MM miscellaneous    levETIRAcetam (KEPPRA) 1000 MG tablet    lidocaine-prilocaine (EMLA) 2.5-2.5 % external cream    lisinopril (ZESTRIL) 40 MG tablet    methadone (DOLOPHINE) 5 MG tablet    naloxone (NARCAN) 4 MG/0.1ML nasal spray    oxyCODONE IR (ROXICODONE) 10 MG tablet    propranolol (INDERAL) 20 MG tablet    rivaroxaban ANTICOAGULANT (XARELTO) 20 MG TABS tablet     No current facility-administered medications for this visit.     BP (!) 149/91 (BP Location: Left arm, Patient Position: Sitting, Cuff Size: Adult Regular)   Pulse 72   Temp 98.1  F (36.7  C) (Oral)   Resp 16   Wt 98.1 kg (216 lb 4.8 oz)   SpO2 96%   BMI 31.94 kg/m    General: No acute distress  HEENT: Sclera anicteric. Oral mucosa pink and moist.  No mucositis or thrush  Heart: Regular, rate, and rhythm  Lungs: Clear to ascultation bilaterally. Breathing comfortably  Abdomen: Positive bowel sounds. Soft, non-distended, non-tender.   Extremities: no lower extremity edema  Neuro: Cranial nerves grossly intact  Rash: none      Labs & Studies: I personally reviewed the following studies:  Most Recent 3 CBC's:  Recent Labs   Lab Test 12/28/23  0755 12/27/23  1512 12/21/23  1550   WBC 11.5* 10.7 9.9   HGB 16.3 16.3 15.1   MCV 91 89 88    231 195     Most Recent 3 BMP's:  Recent Labs   Lab Test 12/28/23  1742 12/28/23  1352 12/28/23  0836 12/28/23  0755 12/27/23  2207 12/27/23  1512 12/21/23  1550   NA  --   --   --  139  --  143 144   POTASSIUM  --   --   --  3.8  --  3.8 3.7   CHLORIDE  --   --   --  102   --  104 106   CO2  --   --   --  26  --  25 29   BUN  --   --   --  15.7  --  13.6 13.3   CR  --   --   --  0.64*  --  0.61* 0.61*   ANIONGAP  --   --   --  11  --  14 9   TORY  --   --   --  9.3  --  10.1* 9.1   * 180* 185* 165*   < > 159* 113*    < > = values in this interval not displayed.    Most Recent 2 LFT's:  Recent Labs   Lab Test 12/28/23  0755 12/27/23  1512   AST 20 20   ALT 13 15   ALKPHOS 83 93   BILITOTAL 0.6 0.5    Most Recent TSH and T4:  Recent Labs   Lab Test 09/12/22  1642   TSH 2.56        ASSESSMENT AND PLAN:  Stage IV NSCLC, Rt lung adenocarcinoma with metastasis to pleura, mediastinum , rt pleural effusion and brain diagnosed 1/2022 (AJCC 8th edition)  PD-L1 TPS 2-3% by [employer]   NGS [facility] panel-EML4:ALK rearragement; chr2:[id number], chr2:[id number]  NGS [facility]- GNAS R201H, KRAS K5E- No ALK    He began Alectinib 600 mg BID 3/2/22 and unfortunately developed grade 3 myalgias which have improved with lowering the dose 450 mg BID. He was holding drug 4/2/22 to 4/11/22 due to MSSA infection from pleurex which is removed. Resumed at 450 mg BID. Due to ongoing myalgias (Although CK is normal), we dose reduced to 300 mg BID.   In Dec 2022, developed Gr 3 arthralgia, and we stopped drug 12/20/22. Had unremitting arthalgias, eventully had to starte PO steroids which led to improvement and resolved. Radiographicaly, he has had a near CR to Rx in the lung, has a residual rt LL lesion.     Began brigatinib 2/22/23 (delayed due to pt hesitancy). Developed severe cough on day 2 so brigatinib was held and cough resolved with in 24-48 hours. He restarted 60 mg daily and upped it to 90 mg.Restging CT showing stable disease in the lung, however, MRI with several contrast enhancement and increase in size of previously treated lesions. His dose was increased to 180 mg daily to address possible CNS disease progression and started on dexamethasone. Then has craniotomy for resection of brain lee and was  found to have recurrent radiation necrosis. CT in July 20123 showing stable disease.   Following surgery, we reduced to 120 mg/day due to worsening joint aches/stiffness. Restaging CT in 9/2023 showing overall disease stablity with no evidence of active cancer. We opted to continue Brigatinib at 120 mg and restage in 3 months. PET/CT after these three months showed oligoperisstent disease with a single focus of active malignancy in the RLL. Met with pulm 12/2023 to discuss resection but they recommended against it due to risk for scarring after. I do not see that he met with radiation but this can be revisited pending upcoming scans, as appropriate.     Plan  - Continue brigatinib at 120 mg  - CT mid March and follow up with Dr. Persaud as scheduled  - will request 6 week video follow up with me to follow, adjust as indicated    # Brain mets:   # Radiation necrosis, improving  -Baseline Brain MRI with several brain mets, s/p GK to 11-12 brain mets. F/u Brain MRI in June 2022 was showing enlargement of the one of the lesions along with edema, therefore had to undergo craniotomy followed by resection, the final biopsy consistent with radiation necrosis.   -received two doses bevacizumab for radiation necrosis in Fall 2022, tolerated poorly with HTN urgency and PE.)   -MRI in 4/2023 now showing contrast enhancement of lesions and a dominate lesion in the R frontal region with edema, several other smaller lesion. Short term MRI showing increase in size of the rt frontal lesion, underwent resection, patho again showing radiation necrosis. Restarted bevacizumab, which resulted in improved radiation necrosis / vasogenic edema on imaging in 10/2023 but ultimately was stopped due to uncontrolled HTN, last dose being 11/2023  -MRI imaging 1/2024 with stable disease and no edema.     #right pleuritic/chest pain  Felt to be 2/2 prior pleurex drain. No acute resp changes today.      #Elevated Lipase  -mild elevation. No concern  on exam     #Diabetes, Type 2  Hyperglycemia today on labs. He will reach out to PCP to get refill of insulin     #PE: provoked by malignancy vs bevacizumab in 11/2022  -- on rivaroxiabn 20 mg daily    #Grade 2-3 arthralgias  All joints affected, no morning stiffness, normal ESR and CRP  -following with palliative; Dr. Brewer  -on methadone BID;Celecoxib, oxycodone     #Hematuria: c/o this again via mychart in December. resolved per his report     #MSSA infection, Sepsis at pleurex site- resolved  # Pleural effusion: s/p pleurex palcement  2/16/22. Then on 4/2 right PleurX catheter s/p removal for severe sepsis due to MSSA infection.    #HTN: elevated today but much improved since HTN urgency levels while he was on Fe. Asked he check a couple times per week at home to re-establish trend. Confirmed he has not had a lapse of his HTN meds with recent insurance coverage lapse.     #hypophos: recheck in two weeks. Given >2 will hold off on replacement.     60 minutes spent on the date of the encounter doing chart review, review of test results, interpretation of tests, patient visit, documentation, discussion with other provider(s), and discussion with family     Chelsea Villegas CNP on 2/26/2024 at 12:37 PM

## 2024-02-27 DIAGNOSIS — C34.31 MALIGNANT NEOPLASM OF LOWER LOBE OF RIGHT LUNG (H): Primary | ICD-10-CM

## 2024-02-27 NOTE — TELEPHONE ENCOUNTER
Prior Authorization Approval    Medication: ALUNBRIG 30 MG PO TABS  Authorization Effective Date: 2/27/2024  Authorization Expiration Date: 2/29/2024  Approved Dose/Quantity: 120/30  Reference #: [id number]   Insurance Company:  [facility]  Expected CoPay: $ 0  Which Pharmacy is filling the prescription: [facility] [place] [address]  Pharmacy Notified: yes    Juliann Morales: Withdrawn from program          Juliette Marquez [facility]  Oncology Pharmacy Liaison II  [email or web address]  [phone] (phone  [phone] (fax

## 2024-03-05 ENCOUNTER — MYC REFILL (OUTPATIENT)
Dept: RADIATION ONCOLOGY | Facility: HOSPITAL | Age: 44
End: 2024-03-05
Payer: MEDICAID

## 2024-03-05 DIAGNOSIS — D49.6 BRAIN TUMOR (H): ICD-10-CM

## 2024-03-05 DIAGNOSIS — Z98.890 S/P CRANIOTOMY: ICD-10-CM

## 2024-03-06 RX ORDER — OXYCODONE HYDROCHLORIDE 10 MG/1
10 TABLET ORAL
Qty: 90 TABLET | Refills: 0 | Status: SHIPPED | OUTPATIENT
Start: 2024-03-06 | End: 2024-03-19

## 2024-03-06 NOTE — TELEPHONE ENCOUNTER
Received [employer] message from patient requesting refill of oxycodone.     Last refill: 2/26/24  Last office visit: 12/19/24  Scheduled for follow up pending, waiting for new insurance before scheduling.     Will route request to NP for review.     Reviewed [place]  Report.

## 2024-03-15 ENCOUNTER — HOSPITAL ENCOUNTER (OUTPATIENT)
Dept: CT IMAGING | Facility: CLINIC | Age: 44
Discharge: HOME | End: 2024-03-15
Attending: STUDENT IN AN ORGANIZED HEALTH CARE EDUCATION/TRAINING PROGRAM | Admitting: STUDENT IN AN ORGANIZED HEALTH CARE EDUCATION/TRAINING PROGRAM
Payer: COMMERCIAL

## 2024-03-15 DIAGNOSIS — C34.31 MALIGNANT NEOPLASM OF LOWER LOBE OF RIGHT LUNG (H): ICD-10-CM

## 2024-03-15 DIAGNOSIS — I10 PRIMARY HYPERTENSION: ICD-10-CM

## 2024-03-15 PROCEDURE — 250N000011 HC RX IP 250 OP 636: Performed by: STUDENT IN AN ORGANIZED HEALTH CARE EDUCATION/TRAINING PROGRAM

## 2024-03-15 PROCEDURE — 71260 CT THORAX DX C+: CPT

## 2024-03-15 PROCEDURE — 250N000009 HC RX 250: Performed by: STUDENT IN AN ORGANIZED HEALTH CARE EDUCATION/TRAINING PROGRAM

## 2024-03-15 RX ORDER — HYDROCHLOROTHIAZIDE 50 MG/1
50 TABLET ORAL DAILY
Qty: 30 TABLET | Refills: 1 | Status: SHIPPED | OUTPATIENT
Start: 2024-03-15 | End: 2024-06-05

## 2024-03-15 RX ORDER — IOPAMIDOL 755 MG/ML
500 INJECTION, SOLUTION INTRAVASCULAR ONCE
Status: COMPLETED | OUTPATIENT
Start: 2024-03-15 | End: 2024-03-15

## 2024-03-15 RX ADMIN — SODIUM CHLORIDE 65 ML: 9 INJECTION, SOLUTION INTRAVENOUS at 14:44

## 2024-03-15 RX ADMIN — IOPAMIDOL 100 ML: 755 INJECTION, SOLUTION INTRAVENOUS at 14:43

## 2024-03-15 NOTE — TELEPHONE ENCOUNTER
"Medication requested: Hydrochlorothiazide 50 mg tab    Last prescribing provider: ERIC Monreal on 11/17/23    Last clinic visit date: Chelsea Villegas CNP on 2/26/24    Recommendations for requested medication (if none, N/A): This writer called Connor and then his wife about the refill. According to our records, pt had medication refilled on 11/17/23 and had one refill. He was given a quantity of 30 tabs.  Call made to Connor who said his wife manages his medications so he wasn't sure what meds he was taking. He stated it was okay for me to call his wife.  Call made to Kylie, Connor's wife. She states she fills his medication box, but he doesn't take the hydrochlorothiazide every day; she says she fills the box, but he forgets or just doesn't take it. He has not been checking his blood pressure nor blood sugar. When she asks him to let her take it, he responds that he's fine. Kylie states, \"There is only so much I can do.\" She reports that he hasn't been good at taking medications, and she has to hand it to him in his hands. She says, \"Sometimes he just gives up and doesn't care.\"    Later, Kylie called back to report last refill was in January 2024.    1350: Called pt to ask him if he could take his blood pressure.   Pt states he can but he was headed to imaging, couldn't find the BP cuff at home, and asked if okay if he gets the BP taken at imaging. Pt's wife states last refill they picked up was on 1/1/24, quantity of 30 tabs. Instructed pt that he needs to take his medications as prescribed because if he got 30 tabs on 1/1, then he should have needed a refill on 1/31. Pt states he will take them as prescribed.    Any other pertinent information (if none, N/A): Per Chelsea Villegas's note on 2/26/24, pt's BP was:    BP (!) 149/91   #HTN: elevated today but much improved since HTN urgency levels while he was on [place]. Asked he check a couple times per week at home to re-establish trend. Confirmed he has not had a " lapse of his HTN meds with recent insurance coverage lapse.     Refilled: Y/N, if NO, why?    Pended and Routed to Chelsea Villegas CNP

## 2024-03-18 ENCOUNTER — TELEPHONE (OUTPATIENT)
Dept: ONCOLOGY | Facility: CLINIC | Age: 44
End: 2024-03-18

## 2024-03-18 DIAGNOSIS — Z79.4 TYPE 2 DIABETES MELLITUS WITH HYPERGLYCEMIA, WITH LONG-TERM CURRENT USE OF INSULIN (H): ICD-10-CM

## 2024-03-18 DIAGNOSIS — R51.9 NONINTRACTABLE EPISODIC HEADACHE, UNSPECIFIED HEADACHE TYPE: ICD-10-CM

## 2024-03-18 DIAGNOSIS — E11.65 TYPE 2 DIABETES MELLITUS WITH HYPERGLYCEMIA, WITH LONG-TERM CURRENT USE OF INSULIN (H): ICD-10-CM

## 2024-03-19 ENCOUNTER — MYC REFILL (OUTPATIENT)
Dept: RADIATION ONCOLOGY | Facility: HOSPITAL | Age: 44
End: 2024-03-19
Payer: MEDICAID

## 2024-03-19 DIAGNOSIS — G89.3 CANCER ASSOCIATED PAIN: ICD-10-CM

## 2024-03-19 DIAGNOSIS — D49.6 BRAIN TUMOR (H): ICD-10-CM

## 2024-03-19 DIAGNOSIS — C34.90 NON-SMALL CELL LUNG CANCER, UNSPECIFIED LATERALITY (H): ICD-10-CM

## 2024-03-19 DIAGNOSIS — C79.31 MALIGNANT NEOPLASM METASTATIC TO BRAIN (H): ICD-10-CM

## 2024-03-19 DIAGNOSIS — Z98.890 S/P CRANIOTOMY: ICD-10-CM

## 2024-03-19 RX ORDER — OXYCODONE HYDROCHLORIDE 10 MG/1
10 TABLET ORAL
Qty: 90 TABLET | Refills: 0 | Status: SHIPPED | OUTPATIENT
Start: 2024-03-19 | End: 2024-03-29

## 2024-03-19 RX ORDER — METHADONE HYDROCHLORIDE 5 MG/1
5 TABLET ORAL 2 TIMES DAILY
Qty: 60 TABLET | Refills: 0 | Status: SHIPPED | OUTPATIENT
Start: 2024-03-25 | End: 2024-04-27

## 2024-03-19 NOTE — TELEPHONE ENCOUNTER
Propanolol Refill   Last prescribing provider: Sarina Navarro     Last clinic visit date: 2/26/24 Chelsea Villegas     Recommendations for requested medication (if none, N/A): Copied from chart note   2/26/24 Chelsea Villegas   propranolol (INDERAL) 20 MG tablet     Any other pertinent information (if none, N/A): N/A    Refilled: Y/N, if NO, why?

## 2024-03-19 NOTE — TELEPHONE ENCOUNTER
Received [employer] message from patient requesting refill of methadone and oxycodone.     Last refill: Methadone 2/26/2024  Last refill oxycodone 3/7/2024  Last office visit: 12/19/2024  Scheduled for follow up pending, message sent to scheduling       Will route request to MD for review.     Reviewed [place]  Report.

## 2024-03-19 NOTE — PROGRESS NOTES
MEDICAL ONCOLOGY FOLLOW UP NOTE    PATIENT NAME: Connor Emerson  ENCOUNTER DATE:March 20, 2024    Care Team  Primary Oncologist: Bassam Persaud MD    REASON FOR CURRENT VISIT: F/u of lung cancer    HISTORY OF PRESENT ILLNESS:  Mr. Connor Emerson is a 43 year old  male who is a non-smoker with PMHx of T2DM, HTN with metastatic NSCLC comes for follow up     Oncologic Hx:    Diagnosis:   Stage IV NSCLC, Rt lung adenocarcinoma with metastasis to pleura, mediastinum , rt pleural effusion and brain diagnosed 1/2022 (AJCC 8th edition)  PD-L1 TPS 2-3% by [place]   NGS [facility] panel-EML4:ALK rearragement  NGS [facility]- GNAS R201H, KRAS K5E- No ALK    Treatment:   2/23/2022- current: Brigatinib. Dose reduced to 60 mg due to cough after two days of 90 mg daily -->back on 90 mg---> increased to 180 mg  ---> settled on 120 mg    6/27/23- Right stealth craniotomy and resection of tumor:     7/20/23- 11/14/24: Bevacizumab for radiation necrosis. Discontinued due to severe HTN.      )  Past:  2/15/22- GK to 11 brain lesions  3/2/22- 12/2022 Alectinib 300 mg BID (Dose reduced to 450 mg BID from 600 mg BID due to grade 3 myalgias 3/21/22, again reduced to 300 mg BID 9/28/22)  6/28/22- Craniotomy, resection  9/28/22-10/26- Bevacizumab for radiation necrosis (stopped due to PE)      Intent of treatment: Palliative    Oncologic course:  1/19/22 to 1/22/22-Admitted to [facility] for 2 week progressive SOB secondary to have large rt sided pleural effusion, needing thoracentesis x2 (1.7L and 2.0 L removed), cytology positive for malignancy, adenocarcinoma.   1/26/22- Rt pleural mass biopsy-Dr. Agrawal--POSITIVE FOR ADENOCARCINOMA CONSISTENT WITH LUNG PRIMARY, admixed with mesothelial hyperplasia and inflammatory infiltrate (+ TTF-1 and CK 7;  negative  p40, calretinin and WT-1. PAX8 immunostain focal +). 4th thoracentesis done simultaneously - 3L approx removed.   2/1/22- PET/CT-Right lower lobe central infiltrative FDG avid 8.2 x 9.6 cm mass  representing a primary lung adenocarcinoma. Ipsilateral right perihilar, bilateral pretracheal, subcarinal and superior mediastinal michele metastases. Contralateral mildly FDG avid few lung nodules are suspicious for contralateral metastasis. At least 3 intracranial metastases in the right frontal lobe, left frontal lobe and left cerebellar hemisphere. Nonspecific mild diffuse bone marrow uptake. Further evaluation with a spine MRI could be considered to rule out early marrow infiltration. This could also be seen with red marrow conversion.  2/5/22-  Brain MRI- At least 9 intracranial metastases as detailed above. The dominant lesions involving the orbital right frontal lobe, the posterior left middle frontal gyrus, anterior right temporal lobe and in the left cerebellar hemisphere have surrounding moderate vasogenic type edema.  2/15/22- Saw Dr. Arango from [facility] Onc- Rcd GK to 12 lesion in bran  2/16/22- Pleurex placement   3/2/22- Started Alectinib 600 mg BID  3/21/22- Dose reduced to 450 mg BID due to grade 3 myalgias and fatigue  4/2/22 to 4/5/22- Admitted at [facility] for- Severe sepsis due to MSSA infection of right PleurX catheter s/p removal- He presented with onset of pain at tube site starting 4/1; at arrival was tachycardic with leukocytosis (22.7) and elevated lactic acid (2.9).  CT chest showed fluid and stranding tracking outside the pleural space into chest wall along pleural catheter.  IR was consulted and removed catheter 4/2 with report of pustular drainage and tip culture growing MSSA.  Thoracic Surg was consulted who felt no surgical indication necessary given minimal pleural fluid and lack of any signs of abscess.  Initially treated with broad spectrum coverage for sepsis, narrowed to Ancef once sensitivities returned with plan to transition to cefadroxil for an additional 10 days at discharge per ID. Held drug 4/2 to 4/11 5/2/22- CT CAP- Overall, positive response to therapy with decreased  size of right lower lobe and right pleural-based masses, pulmonary metastases, hilar and mediastinal lymphadenopathy. However, a single right posterior pleural-based mass has slightly increased in size since 2/24/2022. No metastatic disease in the abdomen and pelvis. Right Pleurx catheter has been removed. Trace right pleural effusion and right basilar atelectasis.  5/2/22- Brain MRI- The previously demonstrated brain metastases are mildly diminished in size versus to 2/5/2022. The degree of edema is also diminished but not completely resolved. Probable trace amounts of intralesional bleeding demonstrated on the gradient sequence within the metastases. No definite new metastasis or progressive mass effect. No hydrocephalus or infarct.    6/15/22 to 6/17/22- Admitted at [facility]-with aphasia and word finding difficulty over last few weeks.  He presented to [facility] ED on 6/10 for evaluation of his symptoms. MRI brain showed multiple intracranial metastases, with interval enlargement of the dominant lesion within the left frontal lobe and increased surrounding vasogenic edema with 2 mm rightward shift of the septum pellucidum. Due to his worsening anxiety, he left AMA. His symptoms continued to progress to where he could not write at work so he decided to go to the ED for re-evaluation and treatment. Evaluated by NSGY, Rad Onc (radiaiton necrosis vs tumor progression).  6/16/22- MR Brain (6/16) shows multiple intracranial metastases, with interval enlargement  of the dominant lesion within the left frontal lobe and increased surrounding vasogenic edema with 2 mm rightward shift of the septum pellucidum.  6/16/22- - CT CAP shows slightly decreased size of right lower lobe and right pleural-based masses. No new pulmonary nodules or lymphadenopathy; No evidence of metastatic disease in the abdomen or pelvis.   6/28/22 to 6/30/22- Admitted at [facility]- Elective left Stealth craniotomy with resection of brain tumor due to ongoing  symptoms. No intraoperative complications. EBL 50 ml.  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  7/5/22- Ct CAP- Right lower lobe low-density nodules are not significantly changed. A small left upper lobe pulmonary nodule is also unchanged. Trace pleural fluid on the right has increased slightly. No convincing evidence for metastatic disease in the abdomen or pelvis.  7/18/22 to 7/19/22- Admitted to [facility] for seizure- Reportedly was only taking once Keppra instead of twice daily. Also resumed on dexamethasone 2 mg daily  8/1/22- Brain MRI- Redemonstrated postsurgical changes status post left frontoparietal Craniotomy. Interval increase in size of the dominant ring-enhancing lesion in the left posterior superior frontal lobe with increased moderate surrounding vasogenic edema and local mass effect resulting in narrowing of the supratentorial ventricular system. No significant midline shift/herniation at this time    8/1/22- Dex was increased to 4 mg daily by Dr. Moran    9/1/22- CT Chest- Near resolution of previously seen right pleural nodule. Stable right lower lobe pulmonary nodule    9/28/22- Bevacizumab for radiation necrosis    10/26/22- Bevacizumab     10/26/22- Ct CAP- Stable posterior medial right lower lobe 1.9 x 1.1 cm nodule series 8 image 176. Adjacent stable scarring and atelectasis. The previously noted pleural nodule posteriorly on the right is not currently clearly identified. Stable left upper lobe 0.3 cm nodule image 56    10/26/22- Brain MRI- Overall improved appearance of multiple intracranial metastases with near resolution of associated edema and diminished enhancement and size of multiple residual lesions. No definite new or progressive metastasis.    11/7/22 to 11/9/22- Admitted for PE and HTN urgency- Small pulmonary embolism in the right lower lobe pulmonary artery. started on Lovenox, Brain MRI neg for PRES.    12/29/22- ED visit- bilateral hip pain, pain in shoulders,  knuckles, knees, and ankles- holding alectinib since 12/20/22 1/6/23- Ct CAP- Mild groundglass nodularity in the left upper lobe is new since the previous exam, and may be infectious in etiology. No other significant interval change. Pulmonary nodules are not significantly changed.    1/6/23- Brain MRI- Stable to diminished sequelae of intracranial metastasis and treatment changes. No new or progressive metastasis. No superimposed acute intracranial finding.     2/23/2022- Start Brigatinib. Dose reduced to 60 mg due to cough after two days of 90 mg daily -  3/23/23-Brigatinib  (increase to 90 mg)    4/20/23- CT CAP- Stable- Previously noted mild groundglass nodularity in the left upper lobe has resolved.Pulmonary nodules are unchanged.Trace amount pleural fluid on the right has decreased slightly.    4/20/23- Brain MRI- Possile progression- Largest metastasis within the right frontal lobe has increased in size with worsening peripheral nodular enhancement and worsening vasogenic edema contributing to new right to left midline shift.  Multiple new/enlarging metastases scattered throughout the cerebral hemispheres and cerebellum. Started on dexamethasone by NGS on 4/28/23 5/17/23- presents to clinic with glucose 627, admission to hospital for stability on insulin drip    6/16/23- Brain MRI- Interval increase in size of the necrotic lesion in the anterior aspect of the right frontal lobe from 2.4 x 2.3 x 2.4 cm previously to 3.0 x 3.5 x 2.8 cm on the current study. Mass effect due to slightly increased vasogenic edema surrounding the right frontal lesion resulted in increase of leftward midline shift of the anterior interhemispheric fissure from 0.7 cm previously to 0.9 cm currently. Interval increase in size in two adjacent metastases at the frontoparietal junction on the left. The remaining scattered intra-axial enhancing nodules are not changed appreciably in size or appearance since the comparison study.No  evidence for acute intracranial pathology.   Dr. Moran- Due to worsening headaches and more problems with walking. Recommended a right Stealth craniotomy for resection of the lesion.     6/27/23- Right stealth craniotomy and resection of tumor: Final path: radiation necrosis    7/10/23- Ct CAP- stable Stable exam without evidence for new disease.Stable pulmonary nodules including a dominant nodular opacity at the right lower lobe.    7/20/23- Bevacizumab for radiation necrosis    8/16/23- Bevacizumab     9/12/23- Ct CAP-  Stable right lower lobe dominant nodular opacity. No new disease in the chest, abdomen and pelvis.     9/15, 10/6, 10/27, 11/17-  Bevacizumab    10/25/23- MRI Brain- 1. Redemonstrated postoperative changes of right frontal craniotomy. Decreased size of the right frontal resection cavity with significant decrease in the surrounding right frontal lobe vasogenic edema seen on the previous study. Mass effect has essentially resolved in the interim and there is no longer any midline shift. In the interim, slightly increased thickness of a rind of peripheral nodular enhancement along the posterior inferior and medial aspects of the right frontal lobe resection cavity, indeterminate. There is no significant elevated cerebral blood volume in this area. The findings may represent evolving posttreatment changes; however, residual tumor is not excluded.  Stable postoperative changes status post left anterior parietal craniotomy with irregular enhancement in the left frontal lobe parenchyma underlying the resection cavity, likely due to posttreatment change. Other scattered supratentorial and infratentorial metastatic lesions are overall similar to slightly decreased in size, as described.    12/5/23- PET/CT- with sole site of disease in the RLL measuring 1.6 x 1.4 cm and with SUV max of 4.2. No other sites of disease. His case was reviewed at tumor board and he met with Dr. Fu 12/14/24 with  recommendations against surgical resection due to risk of significant pleural scarring. Continued on brigatinib 120 mg daily.     1/29/24 Brain MRI: No new metastatic lesions. No significant change in supratentorial and infratentorial subcentimeter metastatic enhancing lesions. Stable right inferior frontal and left high frontal postsurgical changes.     12/22-current: lost to follow up due to insurance issues    3/15/24- Ct chest- Stable nodule medial right lower lobe. No metastatic disease demonstrated.      Interval Hx:    He is generally doing reasonably well per his report. His medical insurance has been figured out and he did not run out of any medications or does not report any lapse of brigatinib during this time.     His myalgias remain very stable/tolerable on the 120 mg dosing. He notes right pleuretic pain with coughing or sneezing. He denies new cough/sob. He continues to work full time. Fatigues easily and goes to bed early.For the last 2-3 days has noticed some headaches. No new neurological concerns. He does not check his BP or BS at home. Reports he has been out of insulin for one month.      ECOG PS 0-1    REVIEW OF SYSTEMS: 14 point ROS negative other than the symptoms noted above in the HPI.    Wt Readings from Last 4 Encounters:   02/26/24 98.1 kg (216 lb 4.8 oz)   12/27/23 101.6 kg (224 lb)   12/27/23 99.4 kg (219 lb 1.6 oz)   12/22/23 97.1 kg (214 lb)      Review of Systems:  A comprehensive ROS was performed and found to be negative or non-contributory with the exception of that noted in the HPI above.    Past Medical History:  GERD  Hypertension, not on medication  Type 2 diabetes mellitus, not on medications currently, previously on Metformin    Past Surgical History:  Past Surgical History:   Procedure Laterality Date    BRONCHOSCOPY RIGID OR FLEXIBLE W/TRANSENDOSCOPIC ENDOBRONCHIAL ULTRASOUND GUIDED Bilateral 1/26/2022    Procedure: Right BRONCHOSCOPY, FIBEROPTIC, endobronchial  ultrasound, pleural biopsy;  Surgeon: Dallin Agrawal MD;  Location: [facility] OR    INJECT BLOCK MEDIAL BRANCH CERVICAL/THORACIC/LUMBAR      INSERT CHEST TUBE Right 2022    Procedure: INSERTION, CATHETER, INTERCOSTAL, FOR DRAINAGE;  Surgeon: Dallin Agrawal MD;  Location: [facility] GI    INSERT CHEST TUBE Right 3/9/2022    Procedure: INSERTION, CATHETER, INTERCOSTAL, FOR DRAINAGE;  Surgeon: Sushila Antonio MD;  Location: [facility] GI    IR CHEST TUBE REMOVAL TUNNELED RIGHT  2022    OPTICAL TRACKING SYSTEM CRANIOTOMY, EXCISE TUMOR, COMBINED Left 2022    Procedure: Left stealth craniotomy for tumor resection with motor mapping;  Surgeon: Stephen Moran MD;  Location:  OR    OPTICAL TRACKING SYSTEM CRANIOTOMY, EXCISE TUMOR, COMBINED Right 2023    Procedure: Right stealth craniotomy and resection of tumor;  Surgeon: Stephen Moran MD;  Location:  OR    ORTHOPEDIC SURGERY      Ganesh. Rotator cuff repair.    PLEUROSCOPY N/A 2022    Procedure: Pleuroscopy with Pleural Biopsy;  Surgeon: Dallin Agrawal MD;  [facility]       Social History:  Lives with wife and 4 kids in [place]. Works as a  for an apartment complex in [place]. Exposure to household chemicals and . No significant exposure to asbestos. No signal exposure to benzene or similar chemicals. No significant smoking history-states that he smoked 1 to 2 cigarettes occasionally per month for about 2 years in college, non-smoking since then. No significant alcohol use history. No other recreational substances. Good support system. Kids are 23, 19, 17 and 13.    Family History  Significant history for cancers on maternal side. Mother  of uterine cancer. 2 maternal uncles have possible metastatic melanoma.    Outpatient Medications:  Current Outpatient Medications   Medication    acetaminophen (TYLENOL) 325 MG tablet    albuterol (PROAIR HFA/PROVENTIL HFA/VENTOLIN HFA) 108 (90 Base) MCG/ACT inhaler     Alcohol Swabs PADS    baclofen (LIORESAL) 10 MG tablet    blood glucose (NO BRAND SPECIFIED) lancets standard    blood glucose (NO BRAND SPECIFIED) test strip    Blood Glucose Monitoring Suppl (ACCU-CHEK GUIDE) w/Device KIT    brigatinib (ALUNBRIG) 30 MG TABS tablet    DULoxetine (CYMBALTA) 30 MG capsule    hydrALAZINE (APRESOLINE) 25 MG tablet    hydrochlorothiazide (HYDRODIURIL) 50 MG tablet    insulin aspart (NOVOLOG PEN) 100 UNIT/ML pen    insulin glargine (LANTUS PEN) 100 UNIT/ML pen    insulin pen needle (32G X 4 MM) 32G X 4 MM miscellaneous    levETIRAcetam (KEPPRA) 1000 MG tablet    lidocaine-prilocaine (EMLA) 2.5-2.5 % external cream    lisinopril (ZESTRIL) 40 MG tablet    methadone (DOLOPHINE) 5 MG tablet    naloxone (NARCAN) 4 MG/0.1ML nasal spray    oxyCODONE IR (ROXICODONE) 10 MG tablet    propranolol (INDERAL) 20 MG tablet    rivaroxaban ANTICOAGULANT (XARELTO) 20 MG TABS tablet     No current facility-administered medications for this visit.     BP (!) 159/106 (BP Location: Right arm, Patient Position: Sitting, Cuff Size: Adult Large)   Pulse 72   Temp 98.8  F (37.1  C) (Oral)   Resp 18   Wt 98.7 kg (217 lb 11.2 oz)   SpO2 95%   BMI 32.15 kg/m    General: No acute distress  HEENT: Sclera anicteric. Oral mucosa pink and moist.  No mucositis or thrush  Heart: Regular, rate, and rhythm  Lungs: Clear to ascultation bilaterally. Breathing comfortably  Abdomen: Positive bowel sounds. Soft, non-distended, non-tender.   Extremities: no lower extremity edema  Neuro: Cranial nerves grossly intact  Rash: none      Labs & Studies: I personally reviewed the following studies:  Most Recent 3 CBC's:  Recent Labs   Lab Test 12/28/23  0755 12/27/23  1512 12/21/23  1550   WBC 11.5* 10.7 9.9   HGB 16.3 16.3 15.1   MCV 91 89 88    231 195     Most Recent 3 BMP's:  Recent Labs   Lab Test 02/26/24  1005 12/28/23  1742 12/28/23  1352 12/28/23  0836 12/28/23  0755 12/27/23  2207 12/27/23  1512     --   --    --  139  --  143   POTASSIUM 4.4  --   --   --  3.8  --  3.8   CHLORIDE 104  --   --   --  102  --  104   CO2 29  --   --   --  26  --  25   BUN 18.8  --   --   --  15.7  --  13.6   CR 0.69  --   --   --  0.64*  --  0.61*   ANIONGAP 10  --   --   --  11  --  14   TORY 9.9  --   --   --  9.3  --  10.1*   *  262* 193* 180*   < > 165*   < > 159*    < > = values in this interval not displayed.    Most Recent 2 LFT's:  Recent Labs   Lab Test 02/26/24  1005 12/28/23  0755   AST 14 20   ALT 11 13   ALKPHOS 99 83   BILITOTAL 0.8 0.6    Most Recent TSH and T4:  Recent Labs   Lab Test 09/12/22  1642   TSH 2.56        ASSESSMENT AND PLAN:  Stage IV NSCLC, Rt lung adenocarcinoma with metastasis to pleura, mediastinum , rt pleural effusion and brain diagnosed 1/2022 (AJCC 8th edition)  PD-L1 TPS 2-3% by [facility]   NGS [facility] panel-EML4:ALK rearragement; chr2:[id number], chr2:[id number]  NGS Guardant- GNAS R201H, KRAS K5E- No ALK    He began Alectinib 600 mg BID 3/2/22 and unfortunately developed grade 3 myalgias which have improved with lowering the dose 450 mg BID. He was holding drug 4/2/22 to 4/11/22 due to MSSA infection from pleurex which is removed. Resumed at 450 mg BID. Due to ongoing myalgias (Although CK is normal), we dose reduced to 300 mg BID.   In Dec 2022, developed Gr 3 arthralgia, and we stopped drug 12/20/22. Had unremitting arthalgias, eventully had to starte PO steroids which led to improvement and resolved. Radiographicaly, he has had a near CR to Rx in the lung, has a residual rt LL lesion.     Began brigatinib 2/22/23 (delayed due to pt hesitancy). Developed severe cough on day 2 so brigatinib was held and cough resolved with in 24-48 hours. He restarted 60 mg daily and upped it to 90 mg.Restging CT showing stable disease in the lung, however, MRI with several contrast enhancement and increase in size of previously treated lesions. His dose was increased to 180 mg daily to address possible CNS  disease progression and started on dexamethasone. 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 CT in July 20123 showing stable disease.   Following surgery, we reduced to 120 mg/day due to worsening joint aches/stiffness. Restaging CT in 9/2023 showing overall disease stablity with no evidence of active cancer. We opted to continue Brigatinib at 120 mg. PET/CT after these three months showed oligoperisstent disease with a single focus of active malignancy in the RLL. Met with pulm 12/2023 to discuss resection but they recommended against it due to risk for scarring after. I do not see that he met with radiation for SBRT for more definitive disease control of the oligopersistent disease.      Plan  - Continue brigatinib at 120 mg  - WIll discuss with rad onc in tumor board for definitive SBRT  -RTC with [place] in 6 weeks, labs every 4 weeks  - CT in 3 months  -RTC with me after CT      # Brain mets:   # Radiation necrosis, improving  -Baseline Brain MRI with several brain mets, s/p GK to 11-12 brain mets. F/u Brain MRI in June 2022 was showing enlargement of the one of the lesions along with edema, therefore had to undergo craniotomy followed by resection, the final biopsy consistent with radiation necrosis.   -received two doses bevacizumab for radiation necrosis in Fall 2022, tolerated poorly with HTN urgency and PE.)   -MRI in 4/2023 now showing contrast enhancement of lesions and a dominate lesion in the R frontal region with edema, several other smaller lesion. Short term MRI showing increase in size of the rt frontal lesion, underwent resection, patho again showing radiation necrosis. Restarted bevacizumab, which resulted in improved radiation necrosis / vasogenic edema on imaging in 10/2023 but ultimately was stopped due to uncontrolled HTN, last dose being 11/2023  -MRI imaging 1/2024 with stable disease and no edema.   - due to reent headaches, will get  Brain MRI sooner (next 1-2 weeks)    #right pleuritic/chest pain  Felt to be 2/2 prior pleurex drain. No acute resp changes today.      #Elevated Lipase  -mild elevation. No concern on exam     #Diabetes, Type 2  Hyperglycemia today on labs. He will reach out to PCP to get refill of insulin     #PE: provoked by malignancy vs bevacizumab in 11/2022  -- on rivaroxiabn 20 mg daily    #Grade 2-3 arthralgias  All joints affected, no morning stiffness, normal ESR and CRP  -following with palliative; Dr. Brewer  -on  oxycodone, previously on methadone BID;Celecoxib,     #Hematuria: c/o this again via mychart in December. resolved per his report     #MSSA infection, Sepsis at pleurex site- resolved  # Pleural effusion: s/p pleurex palcement  2/16/22. Then on 4/2 right PleurX catheter s/p removal for severe sepsis due to MSSA infection.    #HTN: elevated today but much improved since HTN urgency levels while he was on Fe. Asked he check a couple times per week at home to re-establish trend. Confirmed he has not had a lapse of his HTN meds with recent insurance coverage lapse.     #hypophos: recheck in two weeks. Given >2 will hold off on replacement.       On the day of service  Chart review: 5 minutes  Visit duration: 30 minutes  Care coordination: 5 minutes    Bassam Persaud MD    Hematology, Oncology and Transplantation

## 2024-03-20 ENCOUNTER — APPOINTMENT (OUTPATIENT)
Dept: LAB | Facility: CLINIC | Age: 44
End: 2024-03-20
Attending: STUDENT IN AN ORGANIZED HEALTH CARE EDUCATION/TRAINING PROGRAM
Payer: COMMERCIAL

## 2024-03-20 ENCOUNTER — PATIENT OUTREACH (OUTPATIENT)
Dept: ONCOLOGY | Facility: CLINIC | Age: 44
End: 2024-03-20
Payer: MEDICAID

## 2024-03-20 ENCOUNTER — ONCOLOGY VISIT (OUTPATIENT)
Dept: ONCOLOGY | Facility: CLINIC | Age: 44
End: 2024-03-20
Attending: STUDENT IN AN ORGANIZED HEALTH CARE EDUCATION/TRAINING PROGRAM
Payer: COMMERCIAL

## 2024-03-20 VITALS
DIASTOLIC BLOOD PRESSURE: 106 MMHG | WEIGHT: 217.7 LBS | TEMPERATURE: 98.8 F | SYSTOLIC BLOOD PRESSURE: 159 MMHG | OXYGEN SATURATION: 95 % | RESPIRATION RATE: 18 BRPM | BODY MASS INDEX: 32.15 KG/M2 | HEART RATE: 72 BPM

## 2024-03-20 DIAGNOSIS — Z79.899 ENCOUNTER FOR LONG-TERM (CURRENT) USE OF MEDICATIONS: ICD-10-CM

## 2024-03-20 DIAGNOSIS — C79.31 MALIGNANT NEOPLASM METASTATIC TO BRAIN (H): ICD-10-CM

## 2024-03-20 DIAGNOSIS — C34.31 MALIGNANT NEOPLASM OF LOWER LOBE OF RIGHT LUNG (H): ICD-10-CM

## 2024-03-20 DIAGNOSIS — C34.90 NON-SMALL CELL LUNG CANCER (NSCLC) (H): ICD-10-CM

## 2024-03-20 DIAGNOSIS — C34.90 NON-SMALL CELL LUNG CANCER, UNSPECIFIED LATERALITY (H): Primary | ICD-10-CM

## 2024-03-20 LAB
ALBUMIN SERPL BCG-MCNC: 4.6 G/DL (ref 3.5–5.2)
ALP SERPL-CCNC: 100 U/L (ref 40–150)
ALT SERPL W P-5'-P-CCNC: 16 U/L (ref 0–70)
AMYLASE SERPL-CCNC: 110 U/L (ref 28–100)
ANION GAP SERPL CALCULATED.3IONS-SCNC: 12 MMOL/L (ref 7–15)
AST SERPL W P-5'-P-CCNC: 17 U/L (ref 0–45)
BASOPHILS # BLD AUTO: 0.1 10E3/UL (ref 0–0.2)
BASOPHILS NFR BLD AUTO: 1 %
BILIRUB SERPL-MCNC: 0.4 MG/DL
BUN SERPL-MCNC: 18.5 MG/DL (ref 6–20)
CALCIUM SERPL-MCNC: 9.3 MG/DL (ref 8.6–10)
CHLORIDE SERPL-SCNC: 103 MMOL/L (ref 98–107)
CK SERPL-CCNC: 112 U/L (ref 39–308)
CREAT SERPL-MCNC: 0.71 MG/DL (ref 0.67–1.17)
DEPRECATED HCO3 PLAS-SCNC: 27 MMOL/L (ref 22–29)
EGFRCR SERPLBLD CKD-EPI 2021: >90 ML/MIN/1.73M2
EOSINOPHIL # BLD AUTO: 0.6 10E3/UL (ref 0–0.7)
EOSINOPHIL NFR BLD AUTO: 5 %
ERYTHROCYTE [DISTWIDTH] IN BLOOD BY AUTOMATED COUNT: 13.3 % (ref 10–15)
GLUCOSE SERPL-MCNC: 175 MG/DL (ref 70–99)
HCT VFR BLD AUTO: 43.4 % (ref 40–53)
HGB BLD-MCNC: 15 G/DL (ref 13.3–17.7)
IMM GRANULOCYTES # BLD: 0 10E3/UL
IMM GRANULOCYTES NFR BLD: 0 %
LIPASE SERPL-CCNC: 149 U/L (ref 13–60)
LYMPHOCYTES # BLD AUTO: 2.8 10E3/UL (ref 0.8–5.3)
LYMPHOCYTES NFR BLD AUTO: 24 %
MCH RBC QN AUTO: 30.9 PG (ref 26.5–33)
MCHC RBC AUTO-ENTMCNC: 34.6 G/DL (ref 31.5–36.5)
MCV RBC AUTO: 90 FL (ref 78–100)
MONOCYTES # BLD AUTO: 0.8 10E3/UL (ref 0–1.3)
MONOCYTES NFR BLD AUTO: 6 %
NEUTROPHILS # BLD AUTO: 7.6 10E3/UL (ref 1.6–8.3)
NEUTROPHILS NFR BLD AUTO: 64 %
NRBC # BLD AUTO: 0 10E3/UL
NRBC BLD AUTO-RTO: 0 /100
PLATELET # BLD AUTO: 252 10E3/UL (ref 150–450)
POTASSIUM SERPL-SCNC: 3.9 MMOL/L (ref 3.4–5.3)
PROT SERPL-MCNC: 7 G/DL (ref 6.4–8.3)
RBC # BLD AUTO: 4.85 10E6/UL (ref 4.4–5.9)
SODIUM SERPL-SCNC: 142 MMOL/L (ref 135–145)
WBC # BLD AUTO: 11.8 10E3/UL (ref 4–11)

## 2024-03-20 PROCEDURE — 80053 COMPREHEN METABOLIC PANEL: CPT | Performed by: STUDENT IN AN ORGANIZED HEALTH CARE EDUCATION/TRAINING PROGRAM

## 2024-03-20 PROCEDURE — 99215 OFFICE O/P EST HI 40 MIN: CPT | Performed by: STUDENT IN AN ORGANIZED HEALTH CARE EDUCATION/TRAINING PROGRAM

## 2024-03-20 PROCEDURE — 83690 ASSAY OF LIPASE: CPT | Performed by: STUDENT IN AN ORGANIZED HEALTH CARE EDUCATION/TRAINING PROGRAM

## 2024-03-20 PROCEDURE — 82550 ASSAY OF CK (CPK): CPT | Performed by: STUDENT IN AN ORGANIZED HEALTH CARE EDUCATION/TRAINING PROGRAM

## 2024-03-20 PROCEDURE — 36415 COLL VENOUS BLD VENIPUNCTURE: CPT | Performed by: STUDENT IN AN ORGANIZED HEALTH CARE EDUCATION/TRAINING PROGRAM

## 2024-03-20 PROCEDURE — 85025 COMPLETE CBC W/AUTO DIFF WBC: CPT | Performed by: STUDENT IN AN ORGANIZED HEALTH CARE EDUCATION/TRAINING PROGRAM

## 2024-03-20 PROCEDURE — G0463 HOSPITAL OUTPT CLINIC VISIT: HCPCS | Performed by: STUDENT IN AN ORGANIZED HEALTH CARE EDUCATION/TRAINING PROGRAM

## 2024-03-20 PROCEDURE — 82150 ASSAY OF AMYLASE: CPT | Performed by: STUDENT IN AN ORGANIZED HEALTH CARE EDUCATION/TRAINING PROGRAM

## 2024-03-20 ASSESSMENT — PAIN SCALES - GENERAL: PAINLEVEL: NO PAIN (0)

## 2024-03-20 NOTE — NURSING NOTE
Chief Complaint   Patient presents with    Oncology Clinic Visit     Non small cell lung cancer     Labs collected from venipuncture by RN. Vitals taken. Checked in for appointment(s).    Caty Purdy RN

## 2024-03-20 NOTE — PROGRESS NOTES
Called Pt to ensure he was going to attend labs and visit today. Pt was parking when I called. Noted that there was potentially a study we'd like to chat with him about and he verbalized understanding.

## 2024-03-20 NOTE — LETTER
3/20/2024         RE: Connor Emerson  [address]  [facility] [address]        Dear Colleague,    Thank you for referring your patient, Connor Emerson, to the [facility]. Please see a copy of my visit note below.        MEDICAL ONCOLOGY FOLLOW UP NOTE    PATIENT NAME: Connor Emerson  ENCOUNTER DATE:March 20, 2024    Care Team  Primary Oncologist: Bassam Persaud MD    REASON FOR CURRENT VISIT: F/u of lung cancer    HISTORY OF PRESENT ILLNESS:  Mr. Connor Emerson is a 43 year old  male who is a non-smoker with PMHx of T2DM, HTN with metastatic NSCLC comes for follow up     Oncologic Hx:    Diagnosis:   Stage IV NSCLC, Rt lung adenocarcinoma with metastasis to pleura, mediastinum , rt pleural effusion and brain diagnosed 1/2022 (AJCC 8th edition)  PD-L1 TPS 2-3% by [employer]   NGS [facility] panel-EML4:ALK rearragement  NGS [facility]- GNAS R201H, KRAS K5E- No ALK    Treatment:   2/23/2022- current: Brigatinib. Dose reduced to 60 mg due to cough after two days of 90 mg daily -->back on 90 mg---> increased to 180 mg  ---> settled on 120 mg    6/27/23- Right stealth craniotomy and resection of tumor:     7/20/23- 11/14/24: Bevacizumab for radiation necrosis. Discontinued due to severe HTN.      )  Past:  2/15/22- GK to 11 brain lesions  3/2/22- 12/2022 Alectinib 300 mg BID (Dose reduced to 450 mg BID from 600 mg BID due to grade 3 myalgias 3/21/22, again reduced to 300 mg BID 9/28/22)  6/28/22- Craniotomy, resection  9/28/22-10/26- Bevacizumab for radiation necrosis (stopped due to PE)      Intent of treatment: Palliative    Oncologic course:  1/19/22 to 1/22/22-Admitted to [facility] for 2 week progressive SOB secondary to have large rt sided pleural effusion, needing thoracentesis x2 (1.7L and 2.0 L removed), cytology positive for malignancy, adenocarcinoma.   1/26/22- Rt pleural mass biopsy-Dr. Agrawal--POSITIVE FOR ADENOCARCINOMA CONSISTENT WITH LUNG PRIMARY, admixed with mesothelial  hyperplasia and inflammatory infiltrate (+ TTF-1 and CK 7;  negative  p40, calretinin and WT-1. PAX8 immunostain focal +). 4th thoracentesis done simultaneously - 3L approx removed.   2/1/22- PET/CT-Right lower lobe central infiltrative FDG avid 8.2 x 9.6 cm mass representing a primary lung adenocarcinoma. Ipsilateral right perihilar, bilateral pretracheal, subcarinal and superior mediastinal michele metastases. Contralateral mildly FDG avid few lung nodules are suspicious for contralateral metastasis. At least 3 intracranial metastases in the right frontal lobe, left frontal lobe and left cerebellar hemisphere. Nonspecific mild diffuse bone marrow uptake. Further evaluation with a spine MRI could be considered to rule out early marrow infiltration. This could also be seen with red marrow conversion.  2/5/22-  Brain MRI- At least 9 intracranial metastases as detailed above. The dominant lesions involving the orbital right frontal lobe, the posterior left middle frontal gyrus, anterior right temporal lobe and in the left cerebellar hemisphere have surrounding moderate vasogenic type edema.  2/15/22- Saw Dr. Arango from Rad Onc- Rcd GK to 12 lesion in bran  2/16/22- Pleurex placement   3/2/22- Started Alectinib 600 mg BID  3/21/22- Dose reduced to 450 mg BID due to grade 3 myalgias and fatigue  4/2/22 to 4/5/22- Admitted at [facility] for- Severe sepsis due to MSSA infection of right PleurX catheter s/p removal- He presented with onset of pain at tube site starting 4/1; at arrival was tachycardic with leukocytosis (22.7) and elevated lactic acid (2.9).  CT chest showed fluid and stranding tracking outside the pleural space into chest wall along pleural catheter.  IR was consulted and removed catheter 4/2 with report of pustular drainage and tip culture growing MSSA.  Thoracic Surg was consulted who felt no surgical indication necessary given minimal pleural fluid and lack of any signs of abscess.  Initially treated  with broad spectrum coverage for sepsis, narrowed to Ancef once sensitivities returned with plan to transition to cefadroxil for an additional 10 days at discharge per ID. Held drug 4/2 to 4/11 5/2/22- CT CAP- Overall, positive response to therapy with decreased size of right lower lobe and right pleural-based masses, pulmonary metastases, hilar and mediastinal lymphadenopathy. However, a single right posterior pleural-based mass has slightly increased in size since 2/24/2022. No metastatic disease in the abdomen and pelvis. Right Pleurx catheter has been removed. Trace right pleural effusion and right basilar atelectasis.  5/2/22- Brain MRI- The previously demonstrated brain metastases are mildly diminished in size versus to 2/5/2022. The degree of edema is also diminished but not completely resolved. Probable trace amounts of intralesional bleeding demonstrated on the gradient sequence within the metastases. No definite new metastasis or progressive mass effect. No hydrocephalus or infarct.    6/15/22 to 6/17/22- Admitted at [facility]-with aphasia and word finding difficulty over last few weeks.  He presented to [facility] ED on 6/10 for evaluation of his symptoms. MRI brain showed multiple intracranial metastases, with interval enlargement of the dominant lesion within the left frontal lobe and increased surrounding vasogenic edema with 2 mm rightward shift of the septum pellucidum. Due to his worsening anxiety, he left AMA. His symptoms continued to progress to where he could not write at work so he decided to go to the ED for re-evaluation and treatment. Evaluated by NSGY, Rad Onc (radiaiton necrosis vs tumor progression).  6/16/22- MR Brain (6/16) shows multiple intracranial metastases, with interval enlargement  of the dominant lesion within the left frontal lobe and increased surrounding vasogenic edema with 2 mm rightward shift of the septum pellucidum.  6/16/22- - CT CAP shows slightly decreased size of right  lower lobe and right pleural-based masses. No new pulmonary nodules or lymphadenopathy; No evidence of metastatic disease in the abdomen or pelvis.   6/28/22 to 6/30/22- Admitted at [facility]- Elective left Stealth craniotomy with resection of brain tumor due to ongoing symptoms. No intraoperative complications. EBL 50 ml.  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  7/5/22- Ct CAP- Right lower lobe low-density nodules are not significantly changed. A small left upper lobe pulmonary nodule is also unchanged. Trace pleural fluid on the right has increased slightly. No convincing evidence for metastatic disease in the abdomen or pelvis.  7/18/22 to 7/19/22- Admitted to [facility] for seizure- Reportedly was only taking once Keppra instead of twice daily. Also resumed on dexamethasone 2 mg daily  8/1/22- Brain MRI- Redemonstrated postsurgical changes status post left frontoparietal Craniotomy. Interval increase in size of the dominant ring-enhancing lesion in the left posterior superior frontal lobe with increased moderate surrounding vasogenic edema and local mass effect resulting in narrowing of the supratentorial ventricular system. No significant midline shift/herniation at this time    8/1/22- Dex was increased to 4 mg daily by Dr. Moran    9/1/22- CT Chest- Near resolution of previously seen right pleural nodule. Stable right lower lobe pulmonary nodule    9/28/22- Bevacizumab for radiation necrosis    10/26/22- Bevacizumab     10/26/22- Ct CAP- Stable posterior medial right lower lobe 1.9 x 1.1 cm nodule series 8 image 176. Adjacent stable scarring and atelectasis. The previously noted pleural nodule posteriorly on the right is not currently clearly identified. Stable left upper lobe 0.3 cm nodule image 56    10/26/22- Brain MRI- Overall improved appearance of multiple intracranial metastases with near resolution of associated edema and diminished enhancement and size of multiple residual lesions. No definite  new or progressive metastasis.    11/7/22 to 11/9/22- Admitted for PE and HTN urgency- Small pulmonary embolism in the right lower lobe pulmonary artery. started on Lovenox, Brain MRI neg for PRES.    12/29/22- ED visit- bilateral hip pain, pain in shoulders, knuckles, knees, and ankles- holding alectinib since 12/20/22 1/6/23- Ct CAP- Mild groundglass nodularity in the left upper lobe is new since the previous exam, and may be infectious in etiology. No other significant interval change. Pulmonary nodules are not significantly changed.    1/6/23- Brain MRI- Stable to diminished sequelae of intracranial metastasis and treatment changes. No new or progressive metastasis. No superimposed acute intracranial finding.     2/23/2022- Start Brigatinib. Dose reduced to 60 mg due to cough after two days of 90 mg daily -  3/23/23-Brigatinib  (increase to 90 mg)    4/20/23- CT CAP- Stable- Previously noted mild groundglass nodularity in the left upper lobe has resolved.Pulmonary nodules are unchanged.Trace amount pleural fluid on the right has decreased slightly.    4/20/23- Brain MRI- Possile progression- Largest metastasis within the right frontal lobe has increased in size with worsening peripheral nodular enhancement and worsening vasogenic edema contributing to new right to left midline shift.  Multiple new/enlarging metastases scattered throughout the cerebral hemispheres and cerebellum. Started on dexamethasone by NGS on 4/28/23 5/17/23- presents to clinic with glucose 627, admission to hospital for stability on insulin drip    6/16/23- Brain MRI- Interval increase in size of the necrotic lesion in the anterior aspect of the right frontal lobe from 2.4 x 2.3 x 2.4 cm previously to 3.0 x 3.5 x 2.8 cm on the current study. Mass effect due to slightly increased vasogenic edema surrounding the right frontal lesion resulted in increase of leftward midline shift of the anterior interhemispheric fissure from 0.7 cm  previously to 0.9 cm currently. Interval increase in size in two adjacent metastases at the frontoparietal junction on the left. The remaining scattered intra-axial enhancing nodules are not changed appreciably in size or appearance since the comparison study.No evidence for acute intracranial pathology.   Dr. Moran- Due to worsening headaches and more problems with walking. Recommended a right Stealth craniotomy for resection of the lesion.     6/27/23- Right stealth craniotomy and resection of tumor: Final path: radiation necrosis    7/10/23- Ct CAP- stable Stable exam without evidence for new disease.Stable pulmonary nodules including a dominant nodular opacity at the right lower lobe.    7/20/23- Bevacizumab for radiation necrosis    8/16/23- Bevacizumab     9/12/23- Ct CAP-  Stable right lower lobe dominant nodular opacity. No new disease in the chest, abdomen and pelvis.     9/15, 10/6, 10/27, 11/17-  Bevacizumab    10/25/23- MRI Brain- 1. Redemonstrated postoperative changes of right frontal craniotomy. Decreased size of the right frontal resection cavity with significant decrease in the surrounding right frontal lobe vasogenic edema seen on the previous study. Mass effect has essentially resolved in the interim and there is no longer any midline shift. In the interim, slightly increased thickness of a rind of peripheral nodular enhancement along the posterior inferior and medial aspects of the right frontal lobe resection cavity, indeterminate. There is no significant elevated cerebral blood volume in this area. The findings may represent evolving posttreatment changes; however, residual tumor is not excluded.  Stable postoperative changes status post left anterior parietal craniotomy with irregular enhancement in the left frontal lobe parenchyma underlying the resection cavity, likely due to posttreatment change. Other scattered supratentorial and infratentorial metastatic lesions are overall similar to  slightly decreased in size, as described.    12/5/23- PET/CT- with sole site of disease in the RLL measuring 1.6 x 1.4 cm and with SUV max of 4.2. No other sites of disease. His case was reviewed at tumor board and he met with Dr. Fu 12/14/24 with recommendations against surgical resection due to risk of significant pleural scarring. Continued on brigatinib 120 mg daily.     1/29/24 Brain MRI: No new metastatic lesions. No significant change in supratentorial and infratentorial subcentimeter metastatic enhancing lesions. Stable right inferior frontal and left high frontal postsurgical changes.     12/22-current: lost to follow up due to insurance issues    3/15/24- Ct chest- Stable nodule medial right lower lobe. No metastatic disease demonstrated.      Interval Hx:    He is generally doing reasonably well per his report. His medical insurance has been figured out and he did not run out of any medications or does not report any lapse of brigatinib during this time.     His myalgias remain very stable/tolerable on the 120 mg dosing. He notes right pleuretic pain with coughing or sneezing. He denies new cough/sob. He continues to work full time. Fatigues easily and goes to bed early.For the last 2-3 days has noticed some headaches. No new neurological concerns. He does not check his BP or BS at home. Reports he has been out of insulin for one month.      ECOG PS 0-1    REVIEW OF SYSTEMS: 14 point ROS negative other than the symptoms noted above in the HPI.    Wt Readings from Last 4 Encounters:   02/26/24 98.1 kg (216 lb 4.8 oz)   12/27/23 101.6 kg (224 lb)   12/27/23 99.4 kg (219 lb 1.6 oz)   12/22/23 97.1 kg (214 lb)      Review of Systems:  A comprehensive ROS was performed and found to be negative or non-contributory with the exception of that noted in the HPI above.    Past Medical History:  GERD  Hypertension, not on medication  Type 2 diabetes mellitus, not on medications currently, previously on  Metformin    Past Surgical History:  Past Surgical History:   Procedure Laterality Date    BRONCHOSCOPY RIGID OR FLEXIBLE W/TRANSENDOSCOPIC ENDOBRONCHIAL ULTRASOUND GUIDED Bilateral 2022    Procedure: Right BRONCHOSCOPY, FIBEROPTIC, endobronchial ultrasound, pleural biopsy;  Surgeon: Dallin Agrawal MD;  Location:  OR    INJECT BLOCK MEDIAL BRANCH CERVICAL/THORACIC/LUMBAR      INSERT CHEST TUBE Right 2022    Procedure: INSERTION, CATHETER, INTERCOSTAL, FOR DRAINAGE;  Surgeon: Dallin Agrawal MD;  Location: [facility]    INSERT CHEST TUBE Right 3/9/2022    Procedure: INSERTION, CATHETER, INTERCOSTAL, FOR DRAINAGE;  Surgeon: Sushila Antonio MD;  Location:  GI    IR CHEST TUBE REMOVAL TUNNELED RIGHT  2022    OPTICAL TRACKING SYSTEM CRANIOTOMY, EXCISE TUMOR, COMBINED Left 2022    Procedure: Left stealth craniotomy for tumor resection with motor mapping;  Surgeon: Stephen Moran MD;  Location:  OR    OPTICAL TRACKING SYSTEM CRANIOTOMY, EXCISE TUMOR, COMBINED Right 2023    Procedure: Right stealth craniotomy and resection of tumor;  Surgeon: Stephen Moran MD;  Location:  OR    ORTHOPEDIC SURGERY      Ganesh. Rotator cuff repair.    PLEUROSCOPY N/A 2022    Procedure: Pleuroscopy with Pleural Biopsy;  Surgeon: Dallin Agrawal MD;  Location:  OR       Social History:  Lives with wife and 4 kids in [place]. Works as a  for an apartment complex in [place]. Exposure to household chemicals and . No significant exposure to asbestos. No signal exposure to benzene or similar chemicals. No significant smoking history-states that he smoked 1 to 2 cigarettes occasionally per month for about 2 years in college, non-smoking since then. No significant alcohol use history. No other recreational substances. Good support system. Kids are 23, 19, 17 and 13.    Family History  Significant history for cancers on maternal side. Mother  of uterine cancer. 2  maternal uncles have possible metastatic melanoma.    Outpatient Medications:  Current Outpatient Medications   Medication    acetaminophen (TYLENOL) 325 MG tablet    albuterol (PROAIR HFA/PROVENTIL HFA/VENTOLIN HFA) 108 (90 Base) MCG/ACT inhaler    Alcohol Swabs PADS    baclofen (LIORESAL) 10 MG tablet    blood glucose (NO BRAND SPECIFIED) lancets standard    blood glucose (NO BRAND SPECIFIED) test strip    Blood Glucose Monitoring Suppl (ACCU-CHEK GUIDE) w/Device KIT    brigatinib (ALUNBRIG) 30 MG TABS tablet    DULoxetine (CYMBALTA) 30 MG capsule    hydrALAZINE (APRESOLINE) 25 MG tablet    hydrochlorothiazide (HYDRODIURIL) 50 MG tablet    insulin aspart (NOVOLOG PEN) 100 UNIT/ML pen    insulin glargine (LANTUS PEN) 100 UNIT/ML pen    insulin pen needle (32G X 4 MM) 32G X 4 MM miscellaneous    levETIRAcetam (KEPPRA) 1000 MG tablet    lidocaine-prilocaine (EMLA) 2.5-2.5 % external cream    lisinopril (ZESTRIL) 40 MG tablet    methadone (DOLOPHINE) 5 MG tablet    naloxone (NARCAN) 4 MG/0.1ML nasal spray    oxyCODONE IR (ROXICODONE) 10 MG tablet    propranolol (INDERAL) 20 MG tablet    rivaroxaban ANTICOAGULANT (XARELTO) 20 MG TABS tablet     No current facility-administered medications for this visit.     There were no vitals taken for this visit.  General: No acute distress  HEENT: Sclera anicteric. Oral mucosa pink and moist.  No mucositis or thrush  Heart: Regular, rate, and rhythm  Lungs: Clear to ascultation bilaterally. Breathing comfortably  Abdomen: Positive bowel sounds. Soft, non-distended, non-tender.   Extremities: no lower extremity edema  Neuro: Cranial nerves grossly intact  Rash: none      Labs & Studies: I personally reviewed the following studies:  Most Recent 3 CBC's:  Recent Labs   Lab Test 12/28/23  0755 12/27/23  1512 12/21/23  1550   WBC 11.5* 10.7 9.9   HGB 16.3 16.3 15.1   MCV 91 89 88    231 195     Most Recent 3 BMP's:  Recent Labs   Lab Test 02/26/24  1005 12/28/23  1742  12/28/23  1352 12/28/23  0836 12/28/23  0755 12/27/23  2207 12/27/23  1512     --   --   --  139  --  143   POTASSIUM 4.4  --   --   --  3.8  --  3.8   CHLORIDE 104  --   --   --  102  --  104   CO2 29  --   --   --  26  --  25   BUN 18.8  --   --   --  15.7  --  13.6   CR 0.69  --   --   --  0.64*  --  0.61*   ANIONGAP 10  --   --   --  11  --  14   TORY 9.9  --   --   --  9.3  --  10.1*   *  262* 193* 180*   < > 165*   < > 159*    < > = values in this interval not displayed.    Most Recent 2 LFT's:  Recent Labs   Lab Test 02/26/24  1005 12/28/23  0755   AST 14 20   ALT 11 13   ALKPHOS 99 83   BILITOTAL 0.8 0.6    Most Recent TSH and T4:  Recent Labs   Lab Test 09/12/22  1642   TSH 2.56        ASSESSMENT AND PLAN:  Stage IV NSCLC, Rt lung adenocarcinoma with metastasis to pleura, mediastinum , rt pleural effusion and brain diagnosed 1/2022 (AJCC 8th edition)  PD-L1 TPS 2-3% by [employer]   NGS [facility] panel-EML4:ALK rearragement; chr2:[id number], chr2:[id number]  NGS [facility]- GNAS R201H, KRAS K5E- No ALK    He began Alectinib 600 mg BID 3/2/22 and unfortunately developed grade 3 myalgias which have improved with lowering the dose 450 mg BID. He was holding drug 4/2/22 to 4/11/22 due to MSSA infection from pleurex which is removed. Resumed at 450 mg BID. Due to ongoing myalgias (Although CK is normal), we dose reduced to 300 mg BID.   In Dec 2022, developed Gr 3 arthralgia, and we stopped drug 12/20/22. Had unremitting arthalgias, eventully had to starte PO steroids which led to improvement and resolved. Radiographicaly, he has had a near CR to Rx in the lung, has a residual rt LL lesion.     Began brigatinib 2/22/23 (delayed due to pt hesitancy). Developed severe cough on day 2 so brigatinib was held and cough resolved with in 24-48 hours. He restarted 60 mg daily and upped it to 90 mg.Restging CT showing stable disease in the lung, however, MRI with several contrast enhancement and increase in size of  previously treated lesions. His dose was increased to 180 mg daily to address possible CNS disease progression and started on dexamethasone. 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 CT in July 20123 showing stable disease.   Following surgery, we reduced to 120 mg/day due to worsening joint aches/stiffness. Restaging CT in 9/2023 showing overall disease stablity with no evidence of active cancer. We opted to continue Brigatinib at 120 mg. PET/CT after these three months showed oligoperisstent disease with a single focus of active malignancy in the RLL. Met with pulm 12/2023 to discuss resection but they recommended against it due to risk for scarring after. I do not see that he met with radiation for SBRT for more definitive disease control of the oligopersistent disease.      Plan  - Continue brigatinib at 120 mg  - WIll discuss with rad onc in tumor board for definitive SBRT  -RTC with Chelsea in 6 weeks, labs every 4 weeks  - CT in 3 months  -RTC with me after CT      # Brain mets:   # Radiation necrosis, improving  -Baseline Brain MRI with several brain mets, s/p GK to 11-12 brain mets. F/u Brain MRI in June 2022 was showing enlargement of the one of the lesions along with edema, therefore had to undergo craniotomy followed by resection, the final biopsy consistent with radiation necrosis.   -received two doses bevacizumab for radiation necrosis in Fall 2022, tolerated poorly with HTN urgency and PE.)   -MRI in 4/2023 now showing contrast enhancement of lesions and a dominate lesion in the R frontal region with edema, several other smaller lesion. Short term MRI showing increase in size of the rt frontal lesion, underwent resection, patho again showing radiation necrosis. Restarted bevacizumab, which resulted in improved radiation necrosis / vasogenic edema on imaging in 10/2023 but ultimately was stopped due to uncontrolled HTN, last dose being  11/2023  -MRI imaging 1/2024 with stable disease and no edema.   - due to reent headaches, will get Brain MRI sooner (next 1-2 weeks)    #right pleuritic/chest pain  Felt to be 2/2 prior pleurex drain. No acute resp changes today.      #Elevated Lipase  -mild elevation. No concern on exam     #Diabetes, Type 2  Hyperglycemia today on labs. He will reach out to PCP to get refill of insulin     #PE: provoked by malignancy vs bevacizumab in 11/2022  -- on rivaroxiabn 20 mg daily    #Grade 2-3 arthralgias  All joints affected, no morning stiffness, normal ESR and CRP  -following with palliative; Dr. Brewer  -on methadone BID;Celecoxib, oxycodone     #Hematuria: c/o this again via mychart in December. resolved per his report     #MSSA infection, Sepsis at pleurex site- resolved  # Pleural effusion: s/p pleurex palcement  2/16/22. Then on 4/2 right PleurX catheter s/p removal for severe sepsis due to MSSA infection.    #HTN: elevated today but much improved since HTN urgency levels while he was on Fe. Asked he check a couple times per week at home to re-establish trend. Confirmed he has not had a lapse of his HTN meds with recent insurance coverage lapse.     #hypophos: recheck in two weeks. Given >2 will hold off on replacement.       On the day of service  Chart review: 5 minutes  Visit duration: 30 minutes  Care coordination: 5 minutes    Bassam Persaud MD    Hematology, Oncology and Transplantation                                             Again, thank you for allowing me to participate in the care of your patient.        Sincerely,        Bassam Persaud MD

## 2024-03-21 ENCOUNTER — VIRTUAL VISIT (OUTPATIENT)
Dept: RADIATION ONCOLOGY | Facility: CLINIC | Age: 44
End: 2024-03-21
Attending: FAMILY MEDICINE
Payer: COMMERCIAL

## 2024-03-21 VITALS — HEIGHT: 69 IN | WEIGHT: 217 LBS | BODY MASS INDEX: 32.14 KG/M2

## 2024-03-21 DIAGNOSIS — Z51.5 PALLIATIVE CARE PATIENT: Primary | ICD-10-CM

## 2024-03-21 DIAGNOSIS — R53.0 NEOPLASTIC MALIGNANT RELATED FATIGUE: ICD-10-CM

## 2024-03-21 DIAGNOSIS — C79.31 MALIGNANT NEOPLASM METASTATIC TO BRAIN (H): ICD-10-CM

## 2024-03-21 DIAGNOSIS — C34.90 NON-SMALL CELL LUNG CANCER, UNSPECIFIED LATERALITY (H): ICD-10-CM

## 2024-03-21 DIAGNOSIS — G89.3 CANCER ASSOCIATED PAIN: ICD-10-CM

## 2024-03-21 DIAGNOSIS — C34.31 MALIGNANT NEOPLASM OF LOWER LOBE OF RIGHT LUNG (H): Primary | ICD-10-CM

## 2024-03-21 PROCEDURE — 99215 OFFICE O/P EST HI 40 MIN: CPT | Mod: 95 | Performed by: FAMILY MEDICINE

## 2024-03-21 RX ORDER — METHYLPHENIDATE HYDROCHLORIDE 5 MG/1
5-10 TABLET ORAL 2 TIMES DAILY PRN
Qty: 90 TABLET | Refills: 0 | Status: SHIPPED | OUTPATIENT
Start: 2024-03-21 | End: 2024-06-06

## 2024-03-21 ASSESSMENT — PAIN SCALES - GENERAL: PAINLEVEL: NO PAIN (0)

## 2024-03-21 NOTE — PROGRESS NOTES
Virtual Visit Details    Type of service:  Video Visit   Video Start Time: 8:39 AM  Video End Time: 0855    Originating Location (pt. Location): Home    Distant Location (provider location):  On-site  Platform used for Video Visit: [facility]    Palliative Care Outpatient Clinic Progress Note    Patient Name: Cnonor Emerson  Primary Provider: Bassam Persaud    Impression & Recommendations & Counseling:  Connor Emerson is a 43 year old male with history of NSCLC with ALK rearrangement and mets to the brain s/p gamma knife treatments and craniotomy open excision and currently on a second line TKI. He is experiencing 'stiffness' from the TKI and this is better overall and worse when he works. He has a lot of cancer related fatigue.  ECO  Decisional Capacity: very present     PDMP review:  Yes, no concerns     Metastatic NSCLC with ALK rearrangement  S/p GK to brain mets and craniotomy for excision  Cancer associated pain on Methadone 2.5 mg po in AM and 5 mg at HS and using prn oxycodone 10 mg about TID  TKI associated muscle stiffness with good response to HS flexeril  HTN  Headaches--overall better and Connor prefers to minimize use of steroids, if at all possible.  Cancer associated fatigue     Goals of Care:  2023  no change in GOC  3/1/2023 Connor wants to continue cancer-directed therapies as long as the side effects are tolerable.  He is a FULL code should he have a cardiac arrest. He is OK being cared for in the acute care hospital if needed.     Recommendations & Counseling:  Continue cancer care per Dr. Persaud and his team  Continue Methadone 2.5 mg po in AM and 5 mg at HS ; he has not felt over sedated with this.  Continue oxycodone 5-10 mg po q 4 hours prn;  Continue to hold Ambien   Continue Duloxetine.  Narcan nasal spray rx also sent to pharmacy last visit    START:  Ritalin 5-10 mg po when first rising and at noon, if needed.  New rx sent.     Start flexeril 5 mg at HS to see if it helps  morning stiffness;   UTD and [facility] pharmacy resources did not indicate a drug-drug interaction with methadone.     F/up in 8 weeks and sooner prn.        Counseling: All of the above was explained to the patient in lay language. The patient has verbalized a clear understanding of the discussion, asked appropriate questions, which have been answered to patient's apparent satisfaction. The patient is in agreement with the above plan.        Chief Complaint/Patient ID: Connor Emerson 43 year old male with PMHx of  NSCLC with ALK rearrangement and mets to the brain s/p gamma knife treatments and craniotomy open excision and currently on a second line TKI. He is experiencing 'stiffness' from the TKI that has improved with scheduled flexeril at HS.    Last Palliative care appointment: 12/19/2024 with me     Reviewed: yes, no concerns    Interim History:  Connor Emerson is a 43 year old male who is seen today for follow up with Palliative Care via billable video visit. Besides losing his medical insurance for a month things have been going pretty well for him.     Pain:  good control with current regimen    Appetite/Nausea: good overall     Bowels: no concerns     Sleep: excessively--sometimes 12 hours/day     Mood: OK just fatigued    Fatigue:  sleeps up to 12 hours /night on many days; this is beginning to impact his relationships at home.     Coping:  pretty well    Family History- Reviewed in Epic.    Allergies   Allergen Reactions    Vicodin [Hydrocodone-Acetaminophen] Nausea and Vomiting and GI Disturbance       Social History:  Pertinent changes to social history/social situation since last visit: lost hs insurance for a month at the beginning of the year which was stressful.  Key support resources: family; care team  Advance Directive Status:  no ACP documents in Epic    Social History     Tobacco Use    Smoking status: Never     Passive exposure: Never    Smokeless tobacco: Never   Vaping Use    Vaping Use: Never  used   Substance Use Topics    Alcohol use: Yes     Alcohol/week: 0.0 standard drinks of alcohol     Comment: social    Drug use: No         Allergies   Allergen Reactions    Vicodin [Hydrocodone-Acetaminophen] Nausea and Vomiting and GI Disturbance     Current Outpatient Medications   Medication Sig Dispense Refill    acetaminophen (TYLENOL) 325 MG tablet Take 325-650 mg by mouth every 6 hours as needed for mild pain      albuterol (PROAIR HFA/PROVENTIL HFA/VENTOLIN HFA) 108 (90 Base) MCG/ACT inhaler Inhale 2 puffs into the lungs every 6 hours as needed for shortness of breath, wheezing or cough 18 g 0    Alcohol Swabs PADS Use to swab the area of the injection or jabier as directed. Per insurance coverage 100 each 0    baclofen (LIORESAL) 10 MG tablet Take 0.5-1 tablets (5-10 mg) by mouth 3 times daily as needed for other (hiccups) 60 tablet 4    blood glucose (NO BRAND SPECIFIED) lancets standard To use to test glucose level in the blood Use to test blood sugar  4  times daily as directed. To accompany glucose monitor brands per insurance coverage. 100 each 3    blood glucose (NO BRAND SPECIFIED) test strip To use to test glucose level in the blood Use to test blood sugar  4 times daily as directed. To accompany glucose monitor brands per insurance coverage. 100 strip 3    Blood Glucose Monitoring Suppl (ACCU-CHEK GUIDE) w/Device KIT       brigatinib (ALUNBRIG) 30 MG TABS tablet Take 4 tablets (120 mg) by mouth daily May be taken with or without food. Swallow whole. Do not crush or chew tablets. 120 tablet 0    DULoxetine (CYMBALTA) 30 MG capsule Take 30 mg by mouth 2 times daily      hydrALAZINE (APRESOLINE) 25 MG tablet Take 1 tablet (25 mg) by mouth 3 times daily 90 tablet 1    hydrochlorothiazide (HYDRODIURIL) 50 MG tablet TAKE ONE TABLET BY MOUTH DAILY 30 tablet 1    insulin aspart (NOVOLOG PEN) 100 UNIT/ML pen Inject 0-40 Units Subcutaneous 3 times daily (before meals) Per discharge instructions sliding  scale 45 mL 2    insulin glargine (LANTUS PEN) 100 UNIT/ML pen Inject 71 Units Subcutaneous every morning 30 mL 0    insulin pen needle (32G X 4 MM) 32G X 4 MM miscellaneous Use as directed by provider. Per insurance coverage 100 each 0    levETIRAcetam (KEPPRA) 1000 MG tablet Take 1 tablet (1,000 mg) by mouth 2 times daily 60 tablet 11    lidocaine-prilocaine (EMLA) 2.5-2.5 % external cream Apply topically as needed (pain due to port access) Apply to port 30 minutes prior to lab appointment. 30 g 6    lisinopril (ZESTRIL) 40 MG tablet Take 1 tablet (40 mg) by mouth daily 90 tablet 1    [START ON 3/25/2024] methadone (DOLOPHINE) 5 MG tablet Take 1 tablet (5 mg) by mouth 2 times daily 60 tablet 0    naloxone (NARCAN) 4 MG/0.1ML nasal spray Spray 1 spray (4 mg) into one nostril alternating nostrils as needed for opioid reversal every 2-3 minutes until assistance arrives 0.2 mL 3    oxyCODONE IR (ROXICODONE) 10 MG tablet Take 1 tablet (10 mg) by mouth every 3 hours as needed for moderate pain 90 tablet 0    propranolol (INDERAL) 20 MG tablet Take 2 tablets (40 mg) by mouth 3 times daily 180 tablet 1    rivaroxaban ANTICOAGULANT (XARELTO) 20 MG TABS tablet Take 1 tablet (20 mg) by mouth daily (with dinner) 90 tablet 3     Past Medical History:   Diagnosis Date    Atypical chest pain 12/02/2013    Cancer (H)     Complication of anesthesia     Diabetes (H)     GERD (gastroesophageal reflux disease) 12/02/2013    History of pulmonary embolism     HTN (hypertension) 05/14/2012    HTN, goal below 140/90 07/02/2013    Insomnia 02/21/2012    Mediastinal lymphadenopathy     Migraine headache 07/02/2013    Migraine with aura, without mention of intractable migraine without mention of status migrainosus     Pneumonia      Past Surgical History:   Procedure Laterality Date    BRONCHOSCOPY RIGID OR FLEXIBLE W/TRANSENDOSCOPIC ENDOBRONCHIAL ULTRASOUND GUIDED Bilateral 1/26/2022    Procedure: Right BRONCHOSCOPY, FIBEROPTIC,  endobronchial ultrasound, pleural biopsy;  Surgeon: Dallin Agrawal MD;  Location: [facility] OR    INJECT BLOCK MEDIAL BRANCH CERVICAL/THORACIC/LUMBAR      INSERT CHEST TUBE Right 2/16/2022    Procedure: INSERTION, CATHETER, INTERCOSTAL, FOR DRAINAGE;  Surgeon: Dallin Agrawal MD;  Location: [facility] GI    INSERT CHEST TUBE Right 3/9/2022    Procedure: INSERTION, CATHETER, INTERCOSTAL, FOR DRAINAGE;  Surgeon: Sushila Antonio MD;  Location: [facility] GI    IR CHEST TUBE REMOVAL TUNNELED RIGHT  4/2/2022    OPTICAL TRACKING SYSTEM CRANIOTOMY, EXCISE TUMOR, COMBINED Left 6/28/2022    Procedure: Left stealth craniotomy for tumor resection with motor mapping;  Surgeon: Stephen Moran MD;  Location:  OR    OPTICAL TRACKING SYSTEM CRANIOTOMY, EXCISE TUMOR, COMBINED Right 6/27/2023    Procedure: Right stealth craniotomy and resection of tumor;  Surgeon: Stephen Moran MD;  Location:  OR    ORTHOPEDIC SURGERY      Ganesh. Rotator cuff repair.    PLEUROSCOPY N/A 1/26/2022    Procedure: Pleuroscopy with Pleural Biopsy;  Surgeon: Dallin Agrawal MD;  Location: [facility] OR       Physical Exam:   GENERAL APPEARANCE: robust appearing, alert and no distress; neatly groomed  EYES: Eyes grossly normal to inspection, PERRLA, conjunctivae and sclerae without injection or discharge, EOM intact   RESP:  no increased work of breathing; speaks in complete sentences;   MS: No musculoskeletal defects are noted  SKIN: No suspicious lesions or rashes, hydration status appears adequate with normal skin turgor   PSYCH: Alert and oriented x3; speech- coherent , normal rate and volume; able to articulate logical thoughts, able to abstract reason, no tangential thoughts, no hallucinations or delusions, mentation appears normal, Mood is euthymic. Affect is appropriate for this mood state and bright. Thought content is free of suicidal ideation, hallucinations, and delusions.  Eye contact is good during conversation.       Key Data Reviewed:  LABS: 3/20/2024-  Cr 0.71, Albumin 4.6,  Hgb 15,      IMAGIN/15/2024 CT CAP W/CONTRAST  IMPRESSION:  1.  Stable nodule medial right lower lobe.  2.  No metastatic disease demonstrated.    43  minutes spent on the date of the encounter doing chart review, history and exam, patient education & counseling, documentation and other activities as noted above.    Ajith Brewer MD MS FAAFP CAQHPM  [facility]  Office [phone]  Fax [phone]

## 2024-03-21 NOTE — PATIENT INSTRUCTIONS
It was good to see you today, Connor.    Here are the things we talked about:  Continue the methadone and oxycodone.  Let's add Ritalin 5-10 mg by mouth first thing when you get up and then at noon if you need it.    Someone from the team will reach out to schedule a follow up appointment in 2 months.  Reyna will call to check to see if this is an improvement in 10 days.     How to get a hold of us:  For non-urgent matters, MyChart works best.    For more urgent matters, or if you prefer not to use MyChart, call our clinic nurse coordinator Reyna Ma RN at [phone]    We have an on-call number for evenings and weekends. Please call this only if you are having uncontrolled symptoms or serious side effects from your medicines: [phone].     For refills, please give us a week (5 working days) notice. We don't always have providers available everyday to do refills. If you call the day you run out of your medicine, we may not be able to refill it in time, so call 5 days in advance!    Ajith Brewer MD MS FAAFP CAQHPM  [facility]  Office [phone]  Fax [phone]

## 2024-03-21 NOTE — NURSING NOTE
Is the patient currently in the state of [place]? YES    Visit mode:VIDEO    If the visit is dropped, the patient can be reconnected by: VIDEO VISIT: Text to cell phone:   Telephone Information:   Mobile [phone]       Will anyone else be joining the visit? NO  (If patient encounters technical issues they should call [phone]    How would you like to obtain your AVS? MyChart    Are changes needed to the allergy or medication list? Pt stated no changes to allergies, Pt stated no med changes, and pt was just seen yesterday     Reason for visit: [facility] LPN

## 2024-03-26 ENCOUNTER — TUMOR CONFERENCE (OUTPATIENT)
Dept: ONCOLOGY | Facility: CLINIC | Age: 44
End: 2024-03-26
Payer: MEDICAID

## 2024-03-26 NOTE — TUMOR CONFERENCE
Tumor Conference Information       Good response, 1.5 year into his Tx. only 1 concerning area left. Not a surgical Candidate due to scarring concerns. SBRT?    Documentation / Disclaimer Cancer Tumor Board Note  Cancer tumor board recommendations do not override what is determined to be reasonable care and treatment, which is dependent on the circumstances of a patient's case; the patient's medical, social, and personal concerns; and the clinical judgment of the oncologist [physician].

## 2024-03-27 ENCOUNTER — MYC MEDICAL ADVICE (OUTPATIENT)
Dept: ONCOLOGY | Facility: CLINIC | Age: 44
End: 2024-03-27
Payer: MEDICAID

## 2024-03-29 ENCOUNTER — TELEPHONE (OUTPATIENT)
Facility: CLINIC | Age: 44
End: 2024-03-29
Payer: MEDICAID

## 2024-03-29 ENCOUNTER — MYC REFILL (OUTPATIENT)
Dept: RADIATION ONCOLOGY | Facility: HOSPITAL | Age: 44
End: 2024-03-29
Payer: MEDICAID

## 2024-03-29 DIAGNOSIS — D49.6 BRAIN TUMOR (H): ICD-10-CM

## 2024-03-29 DIAGNOSIS — Z98.890 S/P CRANIOTOMY: ICD-10-CM

## 2024-03-29 RX ORDER — PROPRANOLOL HYDROCHLORIDE 20 MG/1
40 TABLET ORAL 3 TIMES DAILY
Qty: 180 TABLET | Refills: 1 | Status: SHIPPED | OUTPATIENT
Start: 2024-03-29 | End: 2024-04-27

## 2024-03-29 RX ORDER — PROPRANOLOL HYDROCHLORIDE 20 MG/1
40 TABLET ORAL 3 TIMES DAILY
Qty: 180 TABLET | Refills: 0 | OUTPATIENT
Start: 2024-03-29

## 2024-03-29 RX ORDER — OXYCODONE HYDROCHLORIDE 10 MG/1
10 TABLET ORAL
Qty: 90 TABLET | Refills: 0 | Status: SHIPPED | OUTPATIENT
Start: 2024-03-29 | End: 2024-04-07

## 2024-03-29 NOTE — TELEPHONE ENCOUNTER
Received [employer] message from patient requesting refill of oxycodone.     Last refill: 3/19/24  Last office visit: 3/21/24  Scheduled for follow up 5/21/24     Will route request to MD for review.     [place]ed [place].

## 2024-03-29 NOTE — TELEPHONE ENCOUNTER
Connor Borden needs his propanolol sent to the pharmacy as he is going out of [facility].    Please send refill to   [facility]    Thank you,  Kaylin Pichardo, PhT  [facility] Float Department

## 2024-03-29 NOTE — TELEPHONE ENCOUNTER
Hello,  The pharmacy is just wondering on the status of this refill request.    Please update the patient and pharmacy    Thank you,  Kaylin Pichardo, PhT  [facility] Float Department

## 2024-03-29 NOTE — TELEPHONE ENCOUNTER
Hi, Connor [facility]    Please send to [facility]    Thank you,  Kaylin Pichardo, PhT  [facility] Float Department

## 2024-04-07 ENCOUNTER — MYC REFILL (OUTPATIENT)
Dept: RADIATION ONCOLOGY | Facility: HOSPITAL | Age: 44
End: 2024-04-07
Payer: MEDICAID

## 2024-04-07 DIAGNOSIS — D49.6 BRAIN TUMOR (H): ICD-10-CM

## 2024-04-07 DIAGNOSIS — Z98.890 S/P CRANIOTOMY: ICD-10-CM

## 2024-04-08 RX ORDER — OXYCODONE HYDROCHLORIDE 10 MG/1
10 TABLET ORAL
Qty: 90 TABLET | Refills: 0 | Status: SHIPPED | OUTPATIENT
Start: 2024-04-09 | End: 2024-04-15

## 2024-04-08 NOTE — TELEPHONE ENCOUNTER
Received [employer] message from patient requesting refill of oxycodone.     Last refill: 3/29/24  Last office visit: 3/21/24  Scheduled for follow up 5/21/24     Will route request to NP for review.     [place]ed [place].

## 2024-04-15 ENCOUNTER — MYC REFILL (OUTPATIENT)
Dept: RADIATION ONCOLOGY | Facility: HOSPITAL | Age: 44
End: 2024-04-15
Payer: MEDICAID

## 2024-04-15 DIAGNOSIS — Z98.890 S/P CRANIOTOMY: ICD-10-CM

## 2024-04-15 DIAGNOSIS — D49.6 BRAIN TUMOR (H): ICD-10-CM

## 2024-04-15 DIAGNOSIS — C34.31 MALIGNANT NEOPLASM OF LOWER LOBE OF RIGHT LUNG (H): Primary | ICD-10-CM

## 2024-04-16 DIAGNOSIS — D49.6 BRAIN TUMOR (H): ICD-10-CM

## 2024-04-16 DIAGNOSIS — Z98.890 S/P CRANIOTOMY: ICD-10-CM

## 2024-04-16 RX ORDER — OXYCODONE HYDROCHLORIDE 10 MG/1
10 TABLET ORAL
Qty: 90 TABLET | Refills: 0 | Status: SHIPPED | OUTPATIENT
Start: 2024-04-19 | End: 2024-04-27

## 2024-04-16 RX ORDER — OXYCODONE HYDROCHLORIDE 10 MG/1
10 TABLET ORAL
Qty: 90 TABLET | Refills: 0 | Status: SHIPPED | OUTPATIENT
Start: 2024-04-20 | End: 2024-04-16

## 2024-04-16 NOTE — TELEPHONE ENCOUNTER
Patient interviewed and examined.  .    Chief Complaint   Patient presents with    Knee Pain      .  My Supervisory Note:     43-year-old female presents complaining of acute right knee injury.  Patient states that she was getting out of a hot tub, slipped, and fell.  She twisted her right knee.  Patient states she had instant pain to the right knee.  Feels like her knee is unstable.  Not able to bear weight.  No pain to the hip, ankle, foot.  Did not strike her head.  Patient was brought in here by her spouse via personal vehicle.    I have provided a substantive portion of this visit, I have reviewed the history, exam, and medical decision making documented and agree with the assessment and plan documented by the APC.  I have spent face to face time with the patient and agree with the findings except where otherwise documented.       Physical Exam  Constitutional:       Appearance: She is obese.   Musculoskeletal:      Comments: Decreased range of motion secondary to pain.  Pain to the proximal tibia at the plateau area.  Distal MSPs intact.  Compartments soft.  No obvious deformity.   Neurological:      Mental Status: She is alert and oriented to person, place, and time.          Vitals:    01/09/24 2041 01/09/24 2200 01/09/24 2236 01/09/24 2342   BP: (!) 148/85   (!) 154/96   BP Location: RFA - Right forearm      Pulse: (!) 106   79   Resp: 16 18 16 18   Temp: 98.9 °F (37.2 °C)      TempSrc: Oral      SpO2: 96%   96%   LMP: 01/05/2024       MDM : Patient was additionally evaluated in triage.  X-ray of the right knee performed which shows a depressed fracture of the right lateral tibial plateau with nondisplaced fracture line extension into the right proximal tibia metadiaphysis.  There is also a knee joint effusion.  This is what was clinically suspected.    Patient was given 600 mg of ibuprofen p.o. and 5 mg of Norco p.o.    She was subsequently brought back to ED bed 17.  IV was established.  Given 4 mg of  Received [employer] message from patient requesting refill of oxycodone.     Last refill: 4/9/2024  Last office visit: 3/21/2024  Scheduled for follow up 5/21/2024     Will route request to MD for review.     [place]     Zofran IV and 4 mg of morphine IV.  Patient was subsequently placed in a long-leg splint that does not involve the ankle or foot.  Patient states she felt much better with the splint on it felt like the leg was more stable.    I reached out to Dr. Chuck Catalan on for Ortho Trauma.  Agrees with long-leg splint.  Would like patient to get a CT scan without contrast.  Clear liquid diet okay.  Admit to medicine.    Preop EKG and blood work performed.  CBC shows no white count, left shift, bandemia.  BMP normal.  Not pregnant.  Coags normal.    EKG performed @ 2222 which shows normal sinus rhythm at 74 bpm.  Normal EKG.  No prior EKG eval for comparison.  EKG interpreted by myself the ED physician.    Patient will have CT scan of the knee prior to going upstairs.  Spoke directly with the [facility] hospitalist Dr. Ryan Kearney who agreed to observation admission.  Patient's primary care physician is Dr. Ilya Stephen with [facility].    .Splint Application    Date/Time: 1/9/2024 11:30 PM    Performed by: Martha Dee DO  Authorized by: Martha Dee DO    Consent:     Consent obtained:  Verbal    Consent given by:  Patient    Risks, benefits, and alternatives were discussed: yes      Risks discussed:  Discoloration, numbness, pain and swelling  Universal protocol:     Procedure explained and questions answered to patient or proxy's satisfaction: yes      Relevant documents present and verified: yes      Imaging studies available: yes      Site/side marked: yes      Immediately prior to procedure a time out was called: yes      Patient identity confirmed:  Verbally with patient, arm band and provided demographic data  Pre-procedure details:     Distal neurologic exam:  Normal    Distal perfusion: distal pulses strong    Procedure details:     Location:  Knee    Knee location:  R knee    Splint type:  Long leg    Supplies:  Cotton padding, fiberglass and elastic bandage  Post-procedure details:     Distal  neurologic exam:  Normal    Distal perfusion: distal pulses strong      Procedure completion:  Tolerated well, no immediate complications          Results for orders placed or performed during the hospital encounter of 01/09/24   Basic Metabolic Panel   Result Value Ref Range    Fasting Status      Sodium 140 135 - 145 mmol/L    Potassium 3.7 3.4 - 5.1 mmol/L    Chloride 107 97 - 110 mmol/L    Carbon Dioxide 25 21 - 32 mmol/L    Anion Gap 12 7 - 19 mmol/L    Glucose 96 70 - 99 mg/dL    BUN 10 6 - 20 mg/dL    Creatinine 0.67 0.51 - 0.95 mg/dL    Glomerular Filtration Rate >90 >=60    BUN/Cr 15 7 - 25    Calcium 8.4 8.4 - 10.2 mg/dL   Beta HCG Quantitative Pregnancy   Result Value Ref Range    HCG, Quantitative <2 <=7 mUnits/mL   Prothrombin Time (INR/PT)   Result Value Ref Range    Protime- PT 10.6 9.7 - 11.8 sec    INR 1.0     Partial Thromboplastin Time (PTT)   Result Value Ref Range    PTT 26 22 - 32 sec   CBC with Automated Differential (performable only)   Result Value Ref Range    WBC 6.7 4.2 - 11.0 K/mcL    RBC 4.70 4.00 - 5.20 mil/mcL    HGB 13.8 12.0 - 15.5 g/dL    HCT 43.5 36.0 - 46.5 %    MCV 92.6 78.0 - 100.0 fl    MCH 29.4 26.0 - 34.0 pg    MCHC 31.7 (L) 32.0 - 36.5 g/dL    RDW-CV 13.1 11.0 - 15.0 %    RDW-SD 44.9 39.0 - 50.0 fL     140 - 450 K/mcL    NRBC 0 <=0 /100 WBC    Neutrophil, Percent 78 %    Lymphocytes, Percent 17 %    Mono, Percent 4 %    Eosinophils, Percent 0 %    Basophils, Percent 1 %    Immature Granulocytes 0 %    Absolute Neutrophils 5.2 1.8 - 7.7 K/mcL    Absolute Lymphocytes 1.1 1.0 - 4.8 K/mcL    Absolute Monocytes 0.3 0.3 - 0.9 K/mcL    Absolute Eosinophils  0.0 0.0 - 0.5 K/mcL    Absolute Basophils 0.0 0.0 - 0.3 K/mcL    Absolute Immature Granulocytes 0.0 0.0 - 0.2 K/mcL        XR KNEE 3 VIEWS RIGHT   Final Result      As above.      Electronically Signed by: MIN FULLER M.D.    Signed on: 1/9/2024 9:42 PM    Workstation ID: [id number]      CT KNEE WO CONTRAST RIGHT     (Results Pending)        Imaging Results              XR KNEE 3 VIEWS RIGHT (Final result)  Result time 01/09/24 21:42:38      Final result                   Impression:      As above.    Electronically Signed by: MIN FULLER M.D.   Signed on: 1/9/2024 9:42 PM   Workstation ID: [id number]               Narrative:    EXAM: XR KNEE 3 VIEWS RIGHT    CLINICAL INDICATION: Pain.    COMPARISON: No comparison was made.    FINDINGS:    There is a depressed fracture of the right lateral tibial plateau with  nondisplaced fracture line extension into the right proximal tibial  metadiaphysis.. There is lateral subluxation of the knee suspicious for  injury. There is a knee joint effusion/lipohemarthrosis.                                        ED Diagnoses       Diagnosis Comment Associated Orders       Final diagnoses    Injury of right knee, initial encounter -- --    Closed fracture of right tibial plateau, initial encounter -- --              Martha Dee DO  01/10/24 0012

## 2024-04-16 NOTE — TELEPHONE ENCOUNTER
Resending refill for oxycodone to fill on 4/19 instead of 4/20 since pt's pharmacy is closed on 4/20.    VINH StaplesN, RN  Palliative Care Nurse Clinician    [phone] (Direct)  [phone] (Main)  [phone] (Appointment Scheduling)

## 2024-04-19 DIAGNOSIS — C34.31 MALIGNANT NEOPLASM OF LOWER LOBE OF RIGHT LUNG (H): Primary | ICD-10-CM

## 2024-04-25 NOTE — TELEPHONE ENCOUNTER
Juliann GUPTA : asked to have application reinstated from 2/2024 - has three days on hand - need VALARIE Ryan  reinstating insurance?  Working on it.  send me VALARIE yes - sent Connor email, he will send to me      Juliette Marquez [facility]  Oncology Pharmacy Liaison II  [email or web address]  [phone] (phone  [phone] (fax

## 2024-04-27 ENCOUNTER — MYC REFILL (OUTPATIENT)
Dept: PALLIATIVE CARE | Facility: CLINIC | Age: 44
End: 2024-04-27
Payer: MEDICAID

## 2024-04-27 ENCOUNTER — MYC REFILL (OUTPATIENT)
Dept: RADIATION ONCOLOGY | Facility: HOSPITAL | Age: 44
End: 2024-04-27
Payer: MEDICAID

## 2024-04-27 ENCOUNTER — MYC REFILL (OUTPATIENT)
Dept: ONCOLOGY | Facility: CLINIC | Age: 44
End: 2024-04-27
Payer: MEDICAID

## 2024-04-27 DIAGNOSIS — D49.6 BRAIN TUMOR (H): ICD-10-CM

## 2024-04-27 DIAGNOSIS — G89.3 CANCER ASSOCIATED PAIN: ICD-10-CM

## 2024-04-27 DIAGNOSIS — C34.90 NON-SMALL CELL LUNG CANCER, UNSPECIFIED LATERALITY (H): ICD-10-CM

## 2024-04-27 DIAGNOSIS — Z98.890 S/P CRANIOTOMY: ICD-10-CM

## 2024-04-27 DIAGNOSIS — C79.31 MALIGNANT NEOPLASM METASTATIC TO BRAIN (H): ICD-10-CM

## 2024-04-27 DIAGNOSIS — R51.9 NONINTRACTABLE EPISODIC HEADACHE, UNSPECIFIED HEADACHE TYPE: ICD-10-CM

## 2024-04-29 RX ORDER — OXYCODONE HYDROCHLORIDE 10 MG/1
10 TABLET ORAL
Qty: 90 TABLET | Refills: 0 | Status: SHIPPED | OUTPATIENT
Start: 2024-04-29 | End: 2024-05-05

## 2024-04-29 RX ORDER — PROPRANOLOL HYDROCHLORIDE 20 MG/1
40 TABLET ORAL 3 TIMES DAILY
Qty: 180 TABLET | Refills: 1 | Status: SHIPPED | OUTPATIENT
Start: 2024-04-29 | End: 2024-06-18

## 2024-04-29 RX ORDER — METHADONE HYDROCHLORIDE 5 MG/1
5 TABLET ORAL 2 TIMES DAILY
Qty: 60 TABLET | Refills: 0 | Status: SHIPPED | OUTPATIENT
Start: 2024-04-29 | End: 2024-06-06

## 2024-04-29 NOTE — TELEPHONE ENCOUNTER
Received [employer] message from patient requesting refill of oxycodone.     Last refill: 4/19/24  Last office visit: 3/21/24  Scheduled for follow up 5/21/24     Will route request to NP for review.     [place]ed [place].

## 2024-04-29 NOTE — TELEPHONE ENCOUNTER
Received [employer] message from patient requesting refill of methadone     Last refill: 3/28/24  Last office visit: 3/21/24  Scheduled for follow up 5/21/24     Will route request to NP for review.     Reviewed [place].

## 2024-04-29 NOTE — TELEPHONE ENCOUNTER
FREE DRUG APPLICATION APPROVED    Medication: ALUNBRIG 30 MG PO TABS  Program Name:  [employer]  Effective Date: 4/29/2024  Expiration Date: 4/29/2025  Pharmacy Filling the Rx: [facility] - [place] [address]  Patient Notified: vidhi Ryan also sent email with how to order his medication - only Alunbrig is provided for free from [employer].       Juliette Marquez CPhT  [facility]  Oncology Pharmacy Liaison II  [email or web address]  [phone] (phone  [phone] (fax

## 2024-04-29 NOTE — TELEPHONE ENCOUNTER
VALARIE Faxed         Juliette Marquez CPhT  [facility]  Oncology Pharmacy Liaison II  [email or web address]  [phone] (phone  [phone] (fax

## 2024-04-29 NOTE — TELEPHONE ENCOUNTER
Received [employer] message from patient requesting refill of propranolol.    Last office visit: 3/21/24  Scheduled for follow up 5/21/24     Will route request to NP for review.

## 2024-05-01 ENCOUNTER — PATIENT OUTREACH (OUTPATIENT)
Dept: ONCOLOGY | Facility: CLINIC | Age: 44
End: 2024-05-01
Payer: MEDICAID

## 2024-05-01 NOTE — PROGRESS NOTES
Called Pt to discuss recent missed appts. Pt states that his insurance is currently not valid for unknown reasons. His status has been fluctuating over the past several months and the Pt is understandably frustrated. States he has given [employer] every document they've asked for. States that a [facility] has told him this is a systematic issue with [employer]. I told him I was sorry to hear this, would inquire if anyone on our end could assist, and in the meantime he should call as soon as he hears if his insurance is valid again. Routed to care and finance teams.

## 2024-05-05 ENCOUNTER — MYC REFILL (OUTPATIENT)
Dept: PALLIATIVE CARE | Facility: CLINIC | Age: 44
End: 2024-05-05
Payer: MEDICAID

## 2024-05-05 DIAGNOSIS — Z98.890 S/P CRANIOTOMY: ICD-10-CM

## 2024-05-05 DIAGNOSIS — D49.6 BRAIN TUMOR (H): ICD-10-CM

## 2024-05-06 RX ORDER — OXYCODONE HYDROCHLORIDE 10 MG/1
10 TABLET ORAL
Qty: 90 TABLET | Refills: 0 | Status: SHIPPED | OUTPATIENT
Start: 2024-05-08 | End: 2024-05-16

## 2024-05-06 NOTE — TELEPHONE ENCOUNTER
Received Feedot message from patient requesting refill of oxycodone. Pt requesting to  medication Wednesday if possible due to going out of town on Thursday.    Last refill: 4/29/24  Last office visit: 3/21/24  Scheduled for follow up 5/21/24     Will route request to MD for review.     Reviewed [place]  Report.

## 2024-05-10 ENCOUNTER — MYC REFILL (OUTPATIENT)
Dept: FAMILY MEDICINE | Facility: CLINIC | Age: 44
End: 2024-05-10
Payer: MEDICAID

## 2024-05-10 DIAGNOSIS — E11.65 TYPE 2 DIABETES MELLITUS WITH HYPERGLYCEMIA, WITHOUT LONG-TERM CURRENT USE OF INSULIN (H): ICD-10-CM

## 2024-05-10 NOTE — TELEPHONE ENCOUNTER
Medication requested: insulin pen needle 32G x 4MM    Last prescribing provider: Bassam Persaud MD on 8/16/23    Last clinic visit date: 3/20/24 with Dr. Persaud    Recommendations for requested medication (if none, N/A): NA    Any other pertinent information (if none, N/A): NA    Refilled: Y/N, if NO, why?    Pended and Routed to Chelsea Villegas CNP as Dr. Persaud is out of the office today.

## 2024-05-15 DIAGNOSIS — C34.31 MALIGNANT NEOPLASM OF LOWER LOBE OF RIGHT LUNG (H): Primary | ICD-10-CM

## 2024-05-16 ENCOUNTER — MYC REFILL (OUTPATIENT)
Dept: PALLIATIVE CARE | Facility: CLINIC | Age: 44
End: 2024-05-16
Payer: MEDICAID

## 2024-05-16 DIAGNOSIS — D49.6 BRAIN TUMOR (H): ICD-10-CM

## 2024-05-16 DIAGNOSIS — Z98.890 S/P CRANIOTOMY: ICD-10-CM

## 2024-05-16 RX ORDER — OXYCODONE HYDROCHLORIDE 10 MG/1
10 TABLET ORAL
Qty: 90 TABLET | Refills: 0 | Status: SHIPPED | OUTPATIENT
Start: 2024-05-20 | End: 2024-05-29

## 2024-05-17 ENCOUNTER — TELEPHONE (OUTPATIENT)
Dept: ONCOLOGY | Facility: CLINIC | Age: 44
End: 2024-05-17

## 2024-05-17 NOTE — TELEPHONE ENCOUNTER
Called to follow-up with pt re: missed labs on 5/15 and 5/17. Per pt: pt has no insurance. Pt re-submitted paperwork and awaiting approval. We agreed to call him and follow up in a week to see where he's at with insurance and possibly reschedule labs then.      Hamida ChinchillaD  Hematology/Oncology Clinical Pharmacist  [facility]  [phone]    **The oral chemotherapy pharmacists are now working on provider-specific teams. Your pharmacist team can be reached at [facility]-ON[facility] or [phone].**

## 2024-05-21 ENCOUNTER — PATIENT OUTREACH (OUTPATIENT)
Dept: ONCOLOGY | Facility: CLINIC | Age: 44
End: 2024-05-21
Payer: MEDICAID

## 2024-05-21 NOTE — PROGRESS NOTES
"Called and discussed with Pt that there is some pushback to refilling diabetic supplies, and inquired if this was previously managed by a primary care. Pt confirms it was. Is willing to reestablish. Noting his request is for needles and not insulin I said I would approve to get by, but he needed to establish as I don't think a refill for insulin or future supplies would be refilled (I made a \"Pinky promise\" he would call the PCP). Pt verbalized understanding.  "

## 2024-05-29 ENCOUNTER — MYC REFILL (OUTPATIENT)
Dept: PALLIATIVE CARE | Facility: CLINIC | Age: 44
End: 2024-05-29
Payer: MEDICAID

## 2024-05-29 DIAGNOSIS — D49.6 BRAIN TUMOR (H): ICD-10-CM

## 2024-05-29 DIAGNOSIS — Z98.890 S/P CRANIOTOMY: ICD-10-CM

## 2024-05-29 RX ORDER — OXYCODONE HYDROCHLORIDE 10 MG/1
10 TABLET ORAL
Qty: 90 TABLET | Refills: 0 | Status: SHIPPED | OUTPATIENT
Start: 2024-05-30 | End: 2024-06-06

## 2024-05-29 NOTE — TELEPHONE ENCOUNTER
Received [employer] message from patient requesting refill of oxycodone.     Last refill: 5/20/24  Last office visit: 3/21/24  Scheduled for follow up pending, pt aware he needs to schedule.     Will route request to NP for review.     Reviewed [place]  Report.

## 2024-06-03 ENCOUNTER — MYC MEDICAL ADVICE (OUTPATIENT)
Dept: ONCOLOGY | Facility: CLINIC | Age: 44
End: 2024-06-03
Payer: MEDICAID

## 2024-06-03 NOTE — TELEPHONE ENCOUNTER
Oncology Nurse Triage - Pain  Situation: Connor reporting the following symptoms: Pain    Background:   Treating Provider: Dr. Persaud    DX: Metastatic NSCLC     Date of last office visit:   3/20/24 with Dr. Persaud  3/21/24 with Dr. Brewer (Rad/Onc)    Recent Treatments:  Brigatinib. Dose reduced to 60 mg due to cough after two days of 90 mg daily -->back on 90 mg---> increased to 180 mg  ---> settled on 120 mg   6/27/23- Right stealth craniotomy and resection of tumor:   7/20/23- 11/14/24: Bevacizumab for radiation necrosis. Discontinued due to severe HTN.    Assessment:     Location: Increased pain R side a few inches below armpit  Onset He's had it for a while now, and has spoken w/Dr. Persaud about it. Located where chest tube was. Reports that Dr. Persaud thought it could be scar tissue. Lately over the last few weeks, it has gotten worse. It feels like a lump inside his chest. He  cannot see the lump. It is not worse when he takes a deep breath. No redness, swelling or warmth in area where it hurts.   Duration/Frequency: constant pain.   Rates: 7/10  Quality: dull pain  Current med(s) used:   oxycodone--took two this morning, and has taken up to 7 per day lately. Has not taken ibuprofen or tylenol.  Taking methadone.  Says he is taking a muscle relaxant  No change in fatigue. Ritalin started a month ago but has not been effective in relieving fatigue. Pt is not taking as prescribed. We discussed that pt should take 1-2 tabs BID. Pt states he only takes one tablet at a time and won't take any past 1200.  Worsens or relieves with: nothing, pain meds help some.    Denies fevers/chills, cough, sore throat, chest pain, SOB, headache, n/v, bowel & bladder issues, abdominal pain, rash, new or increased bleeding.    Recommendations:   Take oxycodone as directed. Pt states he is taking Q 8 H up to 7 tabs/day.  Advised him to take tylenol between oxy doses, up to 1000 mg TID but not to exceed 3000 mg/24  hours.  Continue to take Ritalin as prescribed--1-2 tabs BID.    Informed pt that this message will be sent to Care Team.  If they have any other recommendations, they will let this writer know and pt will be called back.    Pt should call back if pain worsens or if adding tylenol between oxycodone doses doesn't help the pain.  Pt voiced understanding of this information.    Routed to Care Team.    Follow-Up:  1403: Per Dr. Persaud, pt should see Chelsea Villegas to assess symptoms.  1405: Secure chat sent to Chelsea Villegas to ask if okay to schedule with her this week.  1450: Per Chelsea, can schedule with her this week or with Stella Navarro if those times work better for pt.  1450: Spoke with Connor and discussed 's recommendation. Pt agreed to come for apt and will await call from scheduling.  1453: Message sent to [facility] to schedule apt w/Chelsea w/Labs prior.    Pt scheduled on 6/5 for labs and apt with Chelsea.    Routed to Care Team.

## 2024-06-05 ENCOUNTER — ALLIED HEALTH/NURSE VISIT (OUTPATIENT)
Dept: ONCOLOGY | Facility: CLINIC | Age: 44
End: 2024-06-05
Payer: MEDICAID

## 2024-06-05 ENCOUNTER — APPOINTMENT (OUTPATIENT)
Dept: LAB | Facility: CLINIC | Age: 44
End: 2024-06-05
Attending: NURSE PRACTITIONER
Payer: MEDICAID

## 2024-06-05 ENCOUNTER — ONCOLOGY VISIT (OUTPATIENT)
Dept: ONCOLOGY | Facility: CLINIC | Age: 44
End: 2024-06-05
Attending: NURSE PRACTITIONER
Payer: MEDICAID

## 2024-06-05 ENCOUNTER — APPOINTMENT (OUTPATIENT)
Dept: ONCOLOGY | Facility: CLINIC | Age: 44
End: 2024-06-05
Attending: STUDENT IN AN ORGANIZED HEALTH CARE EDUCATION/TRAINING PROGRAM
Payer: MEDICAID

## 2024-06-05 VITALS
DIASTOLIC BLOOD PRESSURE: 109 MMHG | WEIGHT: 216.7 LBS | SYSTOLIC BLOOD PRESSURE: 168 MMHG | RESPIRATION RATE: 16 BRPM | HEART RATE: 99 BPM | BODY MASS INDEX: 32 KG/M2 | TEMPERATURE: 98.3 F | OXYGEN SATURATION: 98 %

## 2024-06-05 DIAGNOSIS — Z79.4 TYPE 2 DIABETES MELLITUS WITHOUT COMPLICATION, WITH LONG-TERM CURRENT USE OF INSULIN (H): ICD-10-CM

## 2024-06-05 DIAGNOSIS — I67.89 RADIATION THERAPY INDUCED BRAIN NECROSIS: ICD-10-CM

## 2024-06-05 DIAGNOSIS — C78.01 MALIGNANT NEOPLASM METASTATIC TO BOTH LUNGS (H): Primary | ICD-10-CM

## 2024-06-05 DIAGNOSIS — C79.31 MALIGNANT NEOPLASM METASTATIC TO BRAIN (H): ICD-10-CM

## 2024-06-05 DIAGNOSIS — E11.9 TYPE 2 DIABETES MELLITUS WITHOUT COMPLICATION, WITH LONG-TERM CURRENT USE OF INSULIN (H): ICD-10-CM

## 2024-06-05 DIAGNOSIS — C34.31 MALIGNANT NEOPLASM OF LOWER LOBE OF RIGHT LUNG (H): Primary | ICD-10-CM

## 2024-06-05 DIAGNOSIS — C78.02 MALIGNANT NEOPLASM METASTATIC TO BOTH LUNGS (H): Primary | ICD-10-CM
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
## 2024-06-05 DIAGNOSIS — I10 PRIMARY HYPERTENSION: ICD-10-CM

## 2024-06-05 DIAGNOSIS — I26.99 PULMONARY EMBOLISM, UNSPECIFIED CHRONICITY, UNSPECIFIED PULMONARY EMBOLISM TYPE, UNSPECIFIED WHETHER ACUTE COR PULMONALE PRESENT (H): ICD-10-CM

## 2024-06-05 LAB
ALBUMIN SERPL BCG-MCNC: 4.5 G/DL (ref 3.5–5.2)
ALP SERPL-CCNC: 107 U/L (ref 40–150)
ALT SERPL W P-5'-P-CCNC: 16 U/L (ref 0–70)
AMYLASE SERPL-CCNC: 77 U/L (ref 28–100)
ANION GAP SERPL CALCULATED.3IONS-SCNC: 11 MMOL/L (ref 7–15)
AST SERPL W P-5'-P-CCNC: 18 U/L (ref 0–45)
BILIRUB SERPL-MCNC: 0.7 MG/DL
BUN SERPL-MCNC: 14.8 MG/DL (ref 6–20)
CALCIUM SERPL-MCNC: 9.2 MG/DL (ref 8.6–10)
CHLORIDE SERPL-SCNC: 104 MMOL/L (ref 98–107)
CK SERPL-CCNC: 139 U/L (ref 39–308)
CREAT SERPL-MCNC: 0.67 MG/DL (ref 0.67–1.17)
DEPRECATED HCO3 PLAS-SCNC: 25 MMOL/L (ref 22–29)
EGFRCR SERPLBLD CKD-EPI 2021: >90 ML/MIN/1.73M2
FASTING STATUS PATIENT QL REPORTED: NO
FASTING STATUS PATIENT QL REPORTED: NO
GLUCOSE SERPL-MCNC: 292 MG/DL (ref 70–99)
GLUCOSE SERPL-MCNC: 292 MG/DL (ref 70–99)
LIPASE SERPL-CCNC: 163 U/L (ref 13–60)
PHOSPHATE SERPL-MCNC: 2.3 MG/DL (ref 2.5–4.5)
POTASSIUM SERPL-SCNC: 4 MMOL/L (ref 3.4–5.3)
PROT SERPL-MCNC: 6.9 G/DL (ref 6.4–8.3)
SODIUM SERPL-SCNC: 140 MMOL/L (ref 135–145)

## 2024-06-05 PROCEDURE — 82947 ASSAY GLUCOSE BLOOD QUANT: CPT | Performed by: NURSE PRACTITIONER

## 2024-06-05 PROCEDURE — 82150 ASSAY OF AMYLASE: CPT | Performed by: NURSE PRACTITIONER

## 2024-06-05 PROCEDURE — 82550 ASSAY OF CK (CPK): CPT | Performed by: NURSE PRACTITIONER

## 2024-06-05 PROCEDURE — 80053 COMPREHEN METABOLIC PANEL: CPT | Performed by: NURSE PRACTITIONER

## 2024-06-05 PROCEDURE — 84100 ASSAY OF PHOSPHORUS: CPT | Performed by: NURSE PRACTITIONER

## 2024-06-05 PROCEDURE — G0463 HOSPITAL OUTPT CLINIC VISIT: HCPCS | Performed by: NURSE PRACTITIONER

## 2024-06-05 PROCEDURE — 99213 OFFICE O/P EST LOW 20 MIN: CPT | Performed by: NURSE PRACTITIONER

## 2024-06-05 PROCEDURE — 36415 COLL VENOUS BLD VENIPUNCTURE: CPT | Performed by: NURSE PRACTITIONER

## 2024-06-05 PROCEDURE — 83690 ASSAY OF LIPASE: CPT | Performed by: NURSE PRACTITIONER

## 2024-06-05 PROCEDURE — 99215 OFFICE O/P EST HI 40 MIN: CPT | Performed by: NURSE PRACTITIONER

## 2024-06-05 RX ORDER — HYDROCHLOROTHIAZIDE 50 MG/1
50 TABLET ORAL DAILY
Qty: 30 TABLET | Refills: 1 | Status: ON HOLD | OUTPATIENT
Start: 2024-06-05 | End: 2024-07-25

## 2024-06-05 ASSESSMENT — PAIN SCALES - GENERAL: PAINLEVEL: SEVERE PAIN (7)

## 2024-06-05 NOTE — PROGRESS NOTES
MEDICAL ONCOLOGY FOLLOW UP NOTE    PATIENT NAME: Connor Emerson  ENCOUNTER DATE:  June 5, 2024      Care Team  Primary Oncologist: Bassam Persaud MD    REASON FOR CURRENT VISIT: F/u of lung cancer    HISTORY OF PRESENT ILLNESS:  Mr. Connor Emerson is a 44 year old  male who is a non-smoker with PMHx of T2DM, HTN with metastatic NSCLC comes for follow up     Oncologic Hx:    Diagnosis:   Stage IV NSCLC, Rt lung adenocarcinoma with metastasis to pleura, mediastinum , rt pleural effusion and brain diagnosed 1/2022 (AJCC 8th edition)  PD-L1 TPS 2-3% by [place]   NGS [facility] panel-EML4:ALK rearragement  NGS [facility]- GNAS R201H, KRAS K5E- No ALK    Treatment:   2/23/2022- current: Brigatinib. Dose reduced to 60 mg due to cough after two days of 90 mg daily -->back on 90 mg---> increased to 180 mg  ---> settled on 120 mg    6/27/23- Right stealth craniotomy and resection of tumor:     7/20/23- 11/14/24: Bevacizumab for radiation necrosis. Discontinued due to severe HTN.    Past:  2/15/22- GK to 11 brain lesions  3/2/22- 12/2022 Alectinib 300 mg BID (Dose reduced to 450 mg BID from 600 mg BID due to grade 3 myalgias 3/21/22, again reduced to 300 mg BID 9/28/22)  6/28/22- Craniotomy, resection  9/28/22-10/26- Bevacizumab for radiation necrosis (stopped due to PE)      Intent of treatment: Palliative    Oncologic course:  1/19/22 to 1/22/22-Admitted to [facility] for 2 week progressive SOB secondary to have large rt sided pleural effusion, needing thoracentesis x2 (1.7L and 2.0 L removed), cytology positive for malignancy, adenocarcinoma.   1/26/22- Rt pleural mass biopsy-Dr. Agrawal--POSITIVE FOR ADENOCARCINOMA CONSISTENT WITH LUNG PRIMARY, admixed with mesothelial hyperplasia and inflammatory infiltrate (+ TTF-1 and CK 7;  negative  p40, calretinin and WT-1. PAX8 immunostain focal +). 4th thoracentesis done simultaneously - 3L approx removed.   2/1/22- PET/CT-Right lower lobe central infiltrative FDG avid 8.2 x 9.6 cm mass  representing a primary lung adenocarcinoma. Ipsilateral right perihilar, bilateral pretracheal, subcarinal and superior mediastinal michele metastases. Contralateral mildly FDG avid few lung nodules are suspicious for contralateral metastasis. At least 3 intracranial metastases in the right frontal lobe, left frontal lobe and left cerebellar hemisphere. Nonspecific mild diffuse bone marrow uptake. Further evaluation with a spine MRI could be considered to rule out early marrow infiltration. This could also be seen with red marrow conversion.  2/5/22-  Brain MRI- At least 9 intracranial metastases as detailed above. The dominant lesions involving the orbital right frontal lobe, the posterior left middle frontal gyrus, anterior right temporal lobe and in the left cerebellar hemisphere have surrounding moderate vasogenic type edema.  2/15/22- Saw Dr. Arango from [facility]- Rcd GK to 12 lesion in bran  2/16/22- Pleurex placement   3/2/22- Started Alectinib 600 mg BID  3/21/22- Dose reduced to 450 mg BID due to grade 3 myalgias and fatigue  4/2/22 to 4/5/22- Admitted at [facility] for- Severe sepsis due to MSSA infection of right PleurX catheter s/p removal- He presented with onset of pain at tube site starting 4/1; at arrival was tachycardic with leukocytosis (22.7) and elevated lactic acid (2.9).  CT chest showed fluid and stranding tracking outside the pleural space into chest wall along pleural catheter.  IR was consulted and removed catheter 4/2 with report of pustular drainage and tip culture growing MSSA.  Thoracic Surg was consulted who felt no surgical indication necessary given minimal pleural fluid and lack of any signs of abscess.  Initially treated with broad spectrum coverage for sepsis, narrowed to Ancef once sensitivities returned with plan to transition to cefadroxil for an additional 10 days at discharge per ID. Held drug 4/2 to 4/11 5/2/22- CT CAP- Overall, positive response to therapy with decreased  size of right lower lobe and right pleural-based masses, pulmonary metastases, hilar and mediastinal lymphadenopathy. However, a single right posterior pleural-based mass has slightly increased in size since 2/24/2022. No metastatic disease in the abdomen and pelvis. Right Pleurx catheter has been removed. Trace right pleural effusion and right basilar atelectasis.  5/2/22- Brain MRI- The previously demonstrated brain metastases are mildly diminished in size versus to 2/5/2022. The degree of edema is also diminished but not completely resolved. Probable trace amounts of intralesional bleeding demonstrated on the gradient sequence within the metastases. No definite new metastasis or progressive mass effect. No hydrocephalus or infarct.    6/15/22 to 6/17/22- Admitted at [facility]-with aphasia and word finding difficulty over last few weeks.  He presented to [facility] ED on 6/10 for evaluation of his symptoms. MRI brain showed multiple intracranial metastases, with interval enlargement of the dominant lesion within the left frontal lobe and increased surrounding vasogenic edema with 2 mm rightward shift of the septum pellucidum. Due to his worsening anxiety, he left AMA. His symptoms continued to progress to where he could not write at work so he decided to go to the ED for re-evaluation and treatment. Evaluated by NSGY, Rad Onc (radiaiton necrosis vs tumor progression).  6/16/22- MR Brain (6/16) shows multiple intracranial metastases, with interval enlargement  of the dominant lesion within the left frontal lobe and increased surrounding vasogenic edema with 2 mm rightward shift of the septum pellucidum.  6/16/22- - CT CAP shows slightly decreased size of right lower lobe and right pleural-based masses. No new pulmonary nodules or lymphadenopathy; No evidence of metastatic disease in the abdomen or pelvis.   6/28/22 to 6/30/22- Admitted at [facility]- Elective left Stealth craniotomy with resection of brain tumor due to ongoing  symptoms. No intraoperative complications. EBL 50 ml.  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  7/5/22- Ct CAP- Right lower lobe low-density nodules are not significantly changed. A small left upper lobe pulmonary nodule is also unchanged. Trace pleural fluid on the right has increased slightly. No convincing evidence for metastatic disease in the abdomen or pelvis.  7/18/22 to 7/19/22- Admitted to [facility] for seizure- Reportedly was only taking once Keppra instead of twice daily. Also resumed on dexamethasone 2 mg daily  8/1/22- Brain MRI- Redemonstrated postsurgical changes status post left frontoparietal Craniotomy. Interval increase in size of the dominant ring-enhancing lesion in the left posterior superior frontal lobe with increased moderate surrounding vasogenic edema and local mass effect resulting in narrowing of the supratentorial ventricular system. No significant midline shift/herniation at this time    8/1/22- Dex was increased to 4 mg daily by Dr. Moran    9/1/22- CT Chest- Near resolution of previously seen right pleural nodule. Stable right lower lobe pulmonary nodule    9/28/22- Bevacizumab for radiation necrosis    10/26/22- Bevacizumab     10/26/22- Ct CAP- Stable posterior medial right lower lobe 1.9 x 1.1 cm nodule series 8 image 176. Adjacent stable scarring and atelectasis. The previously noted pleural nodule posteriorly on the right is not currently clearly identified. Stable left upper lobe 0.3 cm nodule image 56    10/26/22- Brain MRI- Overall improved appearance of multiple intracranial metastases with near resolution of associated edema and diminished enhancement and size of multiple residual lesions. No definite new or progressive metastasis.    11/7/22 to 11/9/22- Admitted for PE and HTN urgency- Small pulmonary embolism in the right lower lobe pulmonary artery. started on Lovenox, Brain MRI neg for PRES.    12/29/22- ED visit- bilateral hip pain, pain in shoulders,  knuckles, knees, and ankles- holding alectinib since 12/20/22 1/6/23- Ct CAP- Mild groundglass nodularity in the left upper lobe is new since the previous exam, and may be infectious in etiology. No other significant interval change. Pulmonary nodules are not significantly changed.    1/6/23- Brain MRI- Stable to diminished sequelae of intracranial metastasis and treatment changes. No new or progressive metastasis. No superimposed acute intracranial finding.     2/23/2022- Start Brigatinib. Dose reduced to 60 mg due to cough after two days of 90 mg daily -  3/23/23-Brigatinib  (increase to 90 mg)    4/20/23- CT CAP- Stable- Previously noted mild groundglass nodularity in the left upper lobe has resolved.Pulmonary nodules are unchanged.Trace amount pleural fluid on the right has decreased slightly.    4/20/23- Brain MRI- Possile progression- Largest metastasis within the right frontal lobe has increased in size with worsening peripheral nodular enhancement and worsening vasogenic edema contributing to new right to left midline shift.  Multiple new/enlarging metastases scattered throughout the cerebral hemispheres and cerebellum. Started on dexamethasone by NGS on 4/28/23 5/17/23- presents to clinic with glucose 627, admission to hospital for stability on insulin drip    6/16/23- Brain MRI- Interval increase in size of the necrotic lesion in the anterior aspect of the right frontal lobe from 2.4 x 2.3 x 2.4 cm previously to 3.0 x 3.5 x 2.8 cm on the current study. Mass effect due to slightly increased vasogenic edema surrounding the right frontal lesion resulted in increase of leftward midline shift of the anterior interhemispheric fissure from 0.7 cm previously to 0.9 cm currently. Interval increase in size in two adjacent metastases at the frontoparietal junction on the left. The remaining scattered intra-axial enhancing nodules are not changed appreciably in size or appearance since the comparison study.No  evidence for acute intracranial pathology.   Dr. Moran- Due to worsening headaches and more problems with walking. Recommended a right Stealth craniotomy for resection of the lesion.     6/27/23- Right stealth craniotomy and resection of tumor: Final path: radiation necrosis    7/10/23- Ct CAP- stable Stable exam without evidence for new disease.Stable pulmonary nodules including a dominant nodular opacity at the right lower lobe.    7/20/23- Bevacizumab for radiation necrosis    8/16/23- Bevacizumab     9/12/23- Ct CAP-  Stable right lower lobe dominant nodular opacity. No new disease in the chest, abdomen and pelvis.     9/15, 10/6, 10/27, 11/17-  Bevacizumab    10/25/23- MRI Brain- 1. Redemonstrated postoperative changes of right frontal craniotomy. Decreased size of the right frontal resection cavity with significant decrease in the surrounding right frontal lobe vasogenic edema seen on the previous study. Mass effect has essentially resolved in the interim and there is no longer any midline shift. In the interim, slightly increased thickness of a rind of peripheral nodular enhancement along the posterior inferior and medial aspects of the right frontal lobe resection cavity, indeterminate. There is no significant elevated cerebral blood volume in this area. The findings may represent evolving posttreatment changes; however, residual tumor is not excluded.  Stable postoperative changes status post left anterior parietal craniotomy with irregular enhancement in the left frontal lobe parenchyma underlying the resection cavity, likely due to posttreatment change. Other scattered supratentorial and infratentorial metastatic lesions are overall similar to slightly decreased in size, as described.    12/5/23- PET/CT- with sole site of disease in the RLL measuring 1.6 x 1.4 cm and with SUV max of 4.2. No other sites of disease. His case was reviewed at tumor board and he met with Dr. Fu 12/14/24 with  recommendations against surgical resection due to risk of significant pleural scarring. Continued on brigatinib 120 mg daily.     1/29/24 Brain MRI: No new metastatic lesions. No significant change in supratentorial and infratentorial subcentimeter metastatic enhancing lesions. Stable right inferior frontal and left high frontal postsurgical changes.     12/22-current: lost to follow up due to insurance issues    3/15/24- CT chest- Stable nodule medial right lower lobe. No metastatic disease demonstrated.      Interval Hx:  Feeling well overall  Having gradually worse right sided pain over the past few weeks; out of oxycodone. Pain is worse with certain movements.  Taking brigatinib as instructed  Increased cravings for sweets; states he will go periods without eating due to decreased appetite and then craves sugary foods. His wife has tried keeping sugary foods out of the house.  Does not frequently check blood sugars at home  Would like to start drinking protein shakes with meals daily  Notes he falls asleep around 5:30pm in the evenings and sleeps through the night; states he does no feel he gets adequate rest despite getting significant amounts of sleep.  Headaches have resolved  Has multiple refill requests today  Recently spent time in [place] with his father which he enjoyed  Has increased anxiety about his treatment plan; states he is nervous he may need surgery. Has already requested 4 months off of work for possible surgery. States he would prefer to undergo radiation therapy if recommended.    ECOG PS 0-1    REVIEW OF SYSTEMS: 14 point ROS negative other than the symptoms noted above in the HPI.    Wt Readings from Last 4 Encounters:   06/05/24 98.3 kg (216 lb 11.2 oz)   03/21/24 98.4 kg (217 lb)   03/20/24 98.7 kg (217 lb 11.2 oz)   02/26/24 98.1 kg (216 lb 4.8 oz)      Review of Systems:  A comprehensive ROS was performed and found to be negative or non-contributory with the exception of that noted in  the HPI above.    Past Medical History:  GERD  Hypertension, not on medication  Type 2 diabetes mellitus, not on medications currently, previously on Metformin    Past Surgical History:  Past Surgical History:   Procedure Laterality Date    BRONCHOSCOPY RIGID OR FLEXIBLE W/TRANSENDOSCOPIC ENDOBRONCHIAL ULTRASOUND GUIDED Bilateral 1/26/2022    Procedure: Right BRONCHOSCOPY, FIBEROPTIC, endobronchial ultrasound, pleural biopsy;  Surgeon: Dallin Agrawal MD;  Location: [facility] OR    INJECT BLOCK MEDIAL BRANCH CERVICAL/THORACIC/LUMBAR      INSERT CHEST TUBE Right 2/16/2022    Procedure: INSERTION, CATHETER, INTERCOSTAL, FOR DRAINAGE;  Surgeon: Dallin Agrawal MD;  Location: [facility] GI    INSERT CHEST TUBE Right 3/9/2022    Procedure: INSERTION, CATHETER, INTERCOSTAL, FOR DRAINAGE;  Surgeon: Sushila Antonio MD;  Location: [facility] GI    IR CHEST TUBE REMOVAL TUNNELED RIGHT  4/2/2022    OPTICAL TRACKING SYSTEM CRANIOTOMY, EXCISE TUMOR, COMBINED Left 6/28/2022    Procedure: Left stealth craniotomy for tumor resection with motor mapping;  Surgeon: Stephen Moran MD;  Location:  OR    OPTICAL TRACKING SYSTEM CRANIOTOMY, EXCISE TUMOR, COMBINED Right 6/27/2023    Procedure: Right stealth craniotomy and resection of tumor;  Surgeon: Stephen Moran MD;  Location:  OR    ORTHOPEDIC SURGERY      Ganesh. Rotator cuff repair.    PLEUROSCOPY N/A 1/26/2022    Procedure: Pleuroscopy with Pleural Biopsy;  Surgeon: Dallin Agrawal MD;  Location:  OR       Social History:  Lives with wife and 4 kids in [place]. Works as a  for an apartment complex in [place]. Exposure to household chemicals and . No significant exposure to asbestos. No signal exposure to benzene or similar chemicals. No significant smoking history-states that he smoked 1 to 2 cigarettes occasionally per month for about 2 years in college, non-smoking since then. No significant alcohol use history. No other recreational substances. Good  support system. Kids are 23, 19, 17 and 13.    Family History  Significant history for cancers on maternal side. Mother  of uterine cancer. 2 maternal uncles have possible metastatic melanoma.    Outpatient Medications:  Current Outpatient Medications   Medication Sig Dispense Refill    acetaminophen (TYLENOL) 325 MG tablet Take 325-650 mg by mouth every 6 hours as needed for mild pain      albuterol (PROAIR HFA/PROVENTIL HFA/VENTOLIN HFA) 108 (90 Base) MCG/ACT inhaler Inhale 2 puffs into the lungs every 6 hours as needed for shortness of breath, wheezing or cough 18 g 0    Alcohol Swabs PADS Use to swab the area of the injection or jabier as directed. Per insurance coverage 100 each 0    baclofen (LIORESAL) 10 MG tablet Take 0.5-1 tablets (5-10 mg) by mouth 3 times daily as needed for other (hiccups) 60 tablet 4    blood glucose (NO BRAND SPECIFIED) lancets standard To use to test glucose level in the blood Use to test blood sugar  4  times daily as directed. To accompany glucose monitor brands per insurance coverage. 100 each 3    blood glucose (NO BRAND SPECIFIED) test strip To use to test glucose level in the blood Use to test blood sugar  4 times daily as directed. To accompany glucose monitor brands per insurance coverage. 100 strip 3    Blood Glucose Monitoring Suppl (ACCU-CHEK GUIDE) w/Device KIT       brigatinib (ALUNBRIG) 30 MG TABS tablet Take 4 tablets (120 mg) by mouth daily May be taken with or without food. Swallow whole. Do not crush or chew tablets. 120 tablet 0    DULoxetine (CYMBALTA) 30 MG capsule Take 30 mg by mouth 2 times daily      hydrALAZINE (APRESOLINE) 25 MG tablet Take 1 tablet (25 mg) by mouth 3 times daily 90 tablet 1    insulin aspart (NOVOLOG PEN) 100 UNIT/ML pen Inject 0-40 Units Subcutaneous 3 times daily (before meals) Per discharge instructions sliding scale 45 mL 2    insulin pen needle (32G X 4 MM) 32G X 4 MM miscellaneous Use as directed by provider. Per insurance coverage  100 each 0    levETIRAcetam (KEPPRA) 1000 MG tablet Take 1 tablet (1,000 mg) by mouth 2 times daily 60 tablet 11    lisinopril (ZESTRIL) 40 MG tablet Take 1 tablet (40 mg) by mouth daily 90 tablet 1    methadone (DOLOPHINE) 5 MG tablet Take 1 tablet (5 mg) by mouth 2 times daily 60 tablet 0    methylphenidate (RITALIN) 5 MG tablet Take 1-2 tablets (5-10 mg) by mouth 2 times daily as needed (fatigue) Take second dose by 12 noon, if it is needed 90 tablet 0    oxyCODONE IR (ROXICODONE) 10 MG tablet Take 1 tablet (10 mg) by mouth every 3 hours as needed for moderate pain 90 tablet 0    propranolol (INDERAL) 20 MG tablet Take 2 tablets (40 mg) by mouth 3 times daily 180 tablet 1    hydrochlorothiazide (HYDRODIURIL) 50 MG tablet Take 1 tablet (50 mg) by mouth daily 30 tablet 1    insulin glargine (LANTUS PEN) 100 UNIT/ML pen Inject 71 Units Subcutaneous every morning 15 mL 0    lidocaine-prilocaine (EMLA) 2.5-2.5 % external cream Apply topically as needed (pain due to port access) Apply to port 30 minutes prior to lab appointment. (Patient not taking: Reported on 6/5/2024) 30 g 6    naloxone (NARCAN) 4 MG/0.1ML nasal spray Spray 1 spray (4 mg) into one nostril alternating nostrils as needed for opioid reversal every 2-3 minutes until assistance arrives (Patient not taking: Reported on 6/5/2024) 0.2 mL 3    rivaroxaban ANTICOAGULANT (XARELTO) 20 MG TABS tablet Take 1 tablet (20 mg) by mouth daily (with dinner) 90 tablet 3     No current facility-administered medications for this visit.     BP (!) 168/109   Pulse 99   Temp 98.3  F (36.8  C) (Oral)   Resp 16   Wt 98.3 kg (216 lb 11.2 oz)   SpO2 98%   BMI 32.00 kg/m    General: No acute distress, no rashes on skin. Tenderness to right upper flank with deep palpation. No erythema or obvious masses visualized. Tissue density palpable, but is somewhat mobile and soft.  HEENT: Sclera anicteric. Oral mucosa pink and moist.  No mucositis or thrush  Heart: Regular, rate, and  rhythm  Lungs: Clear to ascultation bilaterally. Breathing comfortably  Abdomen: Positive bowel sounds. Soft, non-distended, non-tender.   Extremities: no lower extremity edema  Neuro: Cranial nerves grossly intact      Labs & Studies: I personally reviewed the following studies:  Most Recent 3 CBC's:  Recent Labs   Lab Test 03/20/24  1632 12/28/23  0755 12/27/23  1512   WBC 11.8* 11.5* 10.7   HGB 15.0 16.3 16.3   MCV 90 91 89    214 231     Most Recent 3 BMP's:  Recent Labs   Lab Test 06/05/24  1347 03/20/24  1632 02/26/24  1005    142 143   POTASSIUM 4.0 3.9 4.4   CHLORIDE 104 103 104   CO2 25 27 29   BUN 14.8 18.5 18.8   CR 0.67 0.71 0.69   ANIONGAP 11 12 10   TORY 9.2 9.3 9.9   *  292* 175* 262*  262*    Most Recent 2 LFT's:  Recent Labs   Lab Test 06/05/24  1347 03/20/24  1632   AST 18 17   ALT 16 16   ALKPHOS 107 100   BILITOTAL 0.7 0.4    Most Recent TSH and T4:  Recent Labs   Lab Test 09/12/22  1642   TSH 2.56        ASSESSMENT AND PLAN:  Stage IV NSCLC, Rt lung adenocarcinoma with metastasis to pleura, mediastinum , rt pleural effusion and brain diagnosed 1/2022 (AJCC 8th edition)  PD-L1 TPS 2-3% by [place]   NGS [facility] panel-EML4:ALK rearragement; chr2:[id number], chr2:[id number]  NGS [facility]- GNAS R201H, KRAS K5E- No ALK    He began Alectinib 600 mg BID 3/2/22 and unfortunately developed grade 3 myalgias which have improved with lowering the dose 450 mg BID. He was holding drug 4/2/22 to 4/11/22 due to MSSA infection from pleurex which is removed. Resumed at 450 mg BID. Due to ongoing myalgias (Although CK is normal), we dose reduced to 300 mg BID.   In Dec 2022, developed Gr 3 arthralgia, and we stopped drug 12/20/22. Had unremitting arthalgias, eventully had to starte PO steroids which led to improvement and resolved. Radiographicaly, he has had a near CR to Rx in the lung, has a residual rt LL lesion.     Began brigatinib 2/22/23 (delayed due to pt hesitancy). Developed severe  cough on day 2 so brigatinib was held and cough resolved with in 24-48 hours. He restarted 60 mg daily and upped it to 90 mg.Restging CT showing stable disease in the lung, however, MRI with several contrast enhancement and increase in size of previously treated lesions. His dose was increased to 180 mg daily to address possible CNS disease progression and started on dexamethasone. 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 CT in July 20123 showing stable disease.   Following surgery, we reduced to 120 mg/day due to worsening joint aches/stiffness. Restaging CT in 9/2023 showing overall disease stablity with no evidence of active cancer. We opted to continue Brigatinib at 120 mg.     PET/CT after these three months showed oligoperisstent disease with a single focus of active malignancy in the RLL. Met with pulm 12/2023 to discuss resection but they recommended against it due to risk for scarring after. He has not yet met with radiation for SBRT for more definitive disease control of the oligopersistent disease; at this time will plan to defer until after CT CAP and Brain MRI.    Plan  - Continue brigatinib at 120 mg  - CT CAP 6/21/2024  - Brain MRI before visit with Dr. Ng  - RTC with Dr. Ng after scans  -Follow up with Dr. Fu end of June    # Brain mets:   # Radiation necrosis, improving  -Baseline Brain MRI with several brain mets, s/p GK to 11-12 brain mets. F/u Brain MRI in June 2022 was showing enlargement of the one of the lesions along with edema, therefore had to undergo craniotomy followed by resection, the final biopsy consistent with radiation necrosis.   -received two doses bevacizumab for radiation necrosis in Fall 2022, tolerated poorly with HTN urgency and PE.)   -MRI in 4/2023 now showing contrast enhancement of lesions and a dominate lesion in the R frontal region with edema, several other smaller lesion. Short term MRI showing  increase in size of the rt frontal lesion, underwent resection, patho again showing radiation necrosis. Restarted bevacizumab, which resulted in improved radiation necrosis / vasogenic edema on imaging in 10/2023 but ultimately was stopped due to uncontrolled HTN, last dose being 11/2023  -MRI imaging 1/2024 with stable disease and no edema.  -Brain MRI 3/2024 cancelled due to lapse in medical insurance.  -Ordered Brain MRI to be completed before visit with Dr. Ng    #Right pleuritic/chest pain  Felt to be 2/2 prior pleurex drain. No acute resp changes today. Believes pain is gradually worsening, but has also been out of prn oxycodone.  -Has 3 days methadone left; followed by [facility]iative Care and we were able to secure follow up tomorrow     #Elevated Lipase  -mild elevation. No concern on exam    #Diabetes, Type 2  Hyperglycemia today on labs. Has been out of insulin for 3 days. Discussed dietary recommendations as it seems he has been eating a lot of sugary foods lately.   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  -stable at this time with pain mgmt    #HTN: elevated today but much improved since HTN urgency levels while he was on Fe.   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 Due to significant time spent with medication management, will monitor at this time. Recheck 6/12 as scheduled      ALEJANDRO Rae    The patient was seen in conjunction with Boni Sharpe who served as a scribe for today's visit. I have reviewed and edited the above note, and agree with the above findings and plan.      40  minutes spent on the date of the encounter doing chart review, review of test results, interpretation of tests, patient visit, and documentation       Chelsea Villegas CNP on 6/5/2024 at 3:59 PM

## 2024-06-05 NOTE — LETTER
6/5/2024      Connor Emerson  [address]  [facility] [address]      Dear Colleague,    Thank you for referring your patient, Connor Emerson, to the [facility]. Please see a copy of my visit note below.        MEDICAL ONCOLOGY FOLLOW UP NOTE    PATIENT NAME: Connor Emerson  ENCOUNTER DATE:  June 5, 2024      Care Team  Primary Oncologist: Bassam Persaud MD    REASON FOR CURRENT VISIT: F/u of lung cancer    HISTORY OF PRESENT ILLNESS:  Mr. Connor Emerson is a 44 year old  male who is a non-smoker with PMHx of T2DM, HTN with metastatic NSCLC comes for follow up     Oncologic Hx:    Diagnosis:   Stage IV NSCLC, Rt lung adenocarcinoma with metastasis to pleura, mediastinum , rt pleural effusion and brain diagnosed 1/2022 (AJCC 8th edition)  PD-L1 TPS 2-3% by [place]   NGS [facility] panel-EML4:ALK rearragement  NGS Guardant- GNAS R201H, KRAS K5E- No ALK    Treatment:   2/23/2022- current: Brigatinib. Dose reduced to 60 mg due to cough after two days of 90 mg daily -->back on 90 mg---> increased to 180 mg  ---> settled on 120 mg    6/27/23- Right stealth craniotomy and resection of tumor:     7/20/23- 11/14/24: Bevacizumab for radiation necrosis. Discontinued due to severe HTN.    Past:  2/15/22- GK to 11 brain lesions  3/2/22- 12/2022 Alectinib 300 mg BID (Dose reduced to 450 mg BID from 600 mg BID due to grade 3 myalgias 3/21/22, again reduced to 300 mg BID 9/28/22)  6/28/22- Craniotomy, resection  9/28/22-10/26- Bevacizumab for radiation necrosis (stopped due to PE)      Intent of treatment: Palliative    Oncologic course:  1/19/22 to 1/22/22-Admitted to [facility] for 2 week progressive SOB secondary to have large rt sided pleural effusion, needing thoracentesis x2 (1.7L and 2.0 L removed), cytology positive for malignancy, adenocarcinoma.   1/26/22- Rt pleural mass biopsy-Dr. Agrawal--POSITIVE FOR ADENOCARCINOMA CONSISTENT WITH LUNG PRIMARY, admixed with mesothelial hyperplasia and  inflammatory infiltrate (+ TTF-1 and CK 7;  negative  p40, calretinin and WT-1. PAX8 immunostain focal +). 4th thoracentesis done simultaneously - 3L approx removed.   2/1/22- PET/CT-Right lower lobe central infiltrative FDG avid 8.2 x 9.6 cm mass representing a primary lung adenocarcinoma. Ipsilateral right perihilar, bilateral pretracheal, subcarinal and superior mediastinal michele metastases. Contralateral mildly FDG avid few lung nodules are suspicious for contralateral metastasis. At least 3 intracranial metastases in the right frontal lobe, left frontal lobe and left cerebellar hemisphere. Nonspecific mild diffuse bone marrow uptake. Further evaluation with a spine MRI could be considered to rule out early marrow infiltration. This could also be seen with red marrow conversion.  2/5/22-  Brain MRI- At least 9 intracranial metastases as detailed above. The dominant lesions involving the orbital right frontal lobe, the posterior left middle frontal gyrus, anterior right temporal lobe and in the left cerebellar hemisphere have surrounding moderate vasogenic type edema.  2/15/22- Saw Dr. Arango from Rad Onc- Rcd GK to 12 lesion in bran  2/16/22- Pleurex placement   3/2/22- Started Alectinib 600 mg BID  3/21/22- Dose reduced to 450 mg BID due to grade 3 myalgias and fatigue  4/2/22 to 4/5/22- Admitted at [facility] for- Severe sepsis due to MSSA infection of right PleurX catheter s/p removal- He presented with onset of pain at tube site starting 4/1; at arrival was tachycardic with leukocytosis (22.7) and elevated lactic acid (2.9).  CT chest showed fluid and stranding tracking outside the pleural space into chest wall along pleural catheter.  IR was consulted and removed catheter 4/2 with report of pustular drainage and tip culture growing MSSA.  Thoracic Surg was consulted who felt no surgical indication necessary given minimal pleural fluid and lack of any signs of abscess.  Initially treated with broad spectrum  coverage for sepsis, narrowed to Ancef once sensitivities returned with plan to transition to cefadroxil for an additional 10 days at discharge per ID. Held drug 4/2 to 4/11 5/2/22- CT CAP- Overall, positive response to therapy with decreased size of right lower lobe and right pleural-based masses, pulmonary metastases, hilar and mediastinal lymphadenopathy. However, a single right posterior pleural-based mass has slightly increased in size since 2/24/2022. No metastatic disease in the abdomen and pelvis. Right Pleurx catheter has been removed. Trace right pleural effusion and right basilar atelectasis.  5/2/22- Brain MRI- The previously demonstrated brain metastases are mildly diminished in size versus to 2/5/2022. The degree of edema is also diminished but not completely resolved. Probable trace amounts of intralesional bleeding demonstrated on the gradient sequence within the metastases. No definite new metastasis or progressive mass effect. No hydrocephalus or infarct.    6/15/22 to 6/17/22- Admitted at [facility]-with aphasia and word finding difficulty over last few weeks.  He presented to [facility] ED on 6/10 for evaluation of his symptoms. MRI brain showed multiple intracranial metastases, with interval enlargement of the dominant lesion within the left frontal lobe and increased surrounding vasogenic edema with 2 mm rightward shift of the septum pellucidum. Due to his worsening anxiety, he left AMA. His symptoms continued to progress to where he could not write at work so he decided to go to the ED for re-evaluation and treatment. Evaluated by JATINDER, Rad Onc (radiaiton necrosis vs tumor progression).  6/16/22- MR Brain (6/16) shows multiple intracranial metastases, with interval enlargement  of the dominant lesion within the left frontal lobe and increased surrounding vasogenic edema with 2 mm rightward shift of the septum pellucidum.  6/16/22- - CT CAP shows slightly decreased size of right lower lobe and right  pleural-based masses. No new pulmonary nodules or lymphadenopathy; No evidence of metastatic disease in the abdomen or pelvis.   6/28/22 to 6/30/22- Admitted at [facility]- Elective left Stealth craniotomy with resection of brain tumor due to ongoing symptoms. No intraoperative complications. EBL 50 ml.  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  7/5/22- Ct CAP- Right lower lobe low-density nodules are not significantly changed. A small left upper lobe pulmonary nodule is also unchanged. Trace pleural fluid on the right has increased slightly. No convincing evidence for metastatic disease in the abdomen or pelvis.  7/18/22 to 7/19/22- Admitted to [facility] for seizure- Reportedly was only taking once Keppra instead of twice daily. Also resumed on dexamethasone 2 mg daily  8/1/22- Brain MRI- Redemonstrated postsurgical changes status post left frontoparietal Craniotomy. Interval increase in size of the dominant ring-enhancing lesion in the left posterior superior frontal lobe with increased moderate surrounding vasogenic edema and local mass effect resulting in narrowing of the supratentorial ventricular system. No significant midline shift/herniation at this time    8/1/22- Dex was increased to 4 mg daily by Dr. Moran    9/1/22- CT Chest- Near resolution of previously seen right pleural nodule. Stable right lower lobe pulmonary nodule    9/28/22- Bevacizumab for radiation necrosis    10/26/22- Bevacizumab     10/26/22- Ct CAP- Stable posterior medial right lower lobe 1.9 x 1.1 cm nodule series 8 image 176. Adjacent stable scarring and atelectasis. The previously noted pleural nodule posteriorly on the right is not currently clearly identified. Stable left upper lobe 0.3 cm nodule image 56    10/26/22- Brain MRI- Overall improved appearance of multiple intracranial metastases with near resolution of associated edema and diminished enhancement and size of multiple residual lesions. No definite new or progressive  metastasis.    11/7/22 to 11/9/22- Admitted for PE and HTN urgency- Small pulmonary embolism in the right lower lobe pulmonary artery. started on Lovenox, Brain MRI neg for PRES.    12/29/22- ED visit- bilateral hip pain, pain in shoulders, knuckles, knees, and ankles- holding alectinib since 12/20/22 1/6/23- Ct CAP- Mild groundglass nodularity in the left upper lobe is new since the previous exam, and may be infectious in etiology. No other significant interval change. Pulmonary nodules are not significantly changed.    1/6/23- Brain MRI- Stable to diminished sequelae of intracranial metastasis and treatment changes. No new or progressive metastasis. No superimposed acute intracranial finding.     2/23/2022- Start Brigatinib. Dose reduced to 60 mg due to cough after two days of 90 mg daily -  3/23/23-Brigatinib  (increase to 90 mg)    4/20/23- CT CAP- Stable- Previously noted mild groundglass nodularity in the left upper lobe has resolved.Pulmonary nodules are unchanged.Trace amount pleural fluid on the right has decreased slightly.    4/20/23- Brain MRI- Possile progression- Largest metastasis within the right frontal lobe has increased in size with worsening peripheral nodular enhancement and worsening vasogenic edema contributing to new right to left midline shift.  Multiple new/enlarging metastases scattered throughout the cerebral hemispheres and cerebellum. Started on dexamethasone by NGS on 4/28/23 5/17/23- presents to clinic with glucose 627, admission to hospital for stability on insulin drip    6/16/23- Brain MRI- Interval increase in size of the necrotic lesion in the anterior aspect of the right frontal lobe from 2.4 x 2.3 x 2.4 cm previously to 3.0 x 3.5 x 2.8 cm on the current study. Mass effect due to slightly increased vasogenic edema surrounding the right frontal lesion resulted in increase of leftward midline shift of the anterior interhemispheric fissure from 0.7 cm previously to 0.9 cm  currently. Interval increase in size in two adjacent metastases at the frontoparietal junction on the left. The remaining scattered intra-axial enhancing nodules are not changed appreciably in size or appearance since the comparison study.No evidence for acute intracranial pathology.   Dr. Moran- Due to worsening headaches and more problems with walking. Recommended a right Stealth craniotomy for resection of the lesion.     6/27/23- Right stealth craniotomy and resection of tumor: Final path: radiation necrosis    7/10/23- Ct CAP- stable Stable exam without evidence for new disease.Stable pulmonary nodules including a dominant nodular opacity at the right lower lobe.    7/20/23- Bevacizumab for radiation necrosis    8/16/23- Bevacizumab     9/12/23- Ct CAP-  Stable right lower lobe dominant nodular opacity. No new disease in the chest, abdomen and pelvis.     9/15, 10/6, 10/27, 11/17-  Bevacizumab    10/25/23- MRI Brain- 1. Redemonstrated postoperative changes of right frontal craniotomy. Decreased size of the right frontal resection cavity with significant decrease in the surrounding right frontal lobe vasogenic edema seen on the previous study. Mass effect has essentially resolved in the interim and there is no longer any midline shift. In the interim, slightly increased thickness of a rind of peripheral nodular enhancement along the posterior inferior and medial aspects of the right frontal lobe resection cavity, indeterminate. There is no significant elevated cerebral blood volume in this area. The findings may represent evolving posttreatment changes; however, residual tumor is not excluded.  Stable postoperative changes status post left anterior parietal craniotomy with irregular enhancement in the left frontal lobe parenchyma underlying the resection cavity, likely due to posttreatment change. Other scattered supratentorial and infratentorial metastatic lesions are overall similar to slightly decreased in  size, as described.    12/5/23- PET/CT- with sole site of disease in the RLL measuring 1.6 x 1.4 cm and with SUV max of 4.2. No other sites of disease. His case was reviewed at tumor board and he met with Dr. Fu 12/14/24 with recommendations against surgical resection due to risk of significant pleural scarring. Continued on brigatinib 120 mg daily.     1/29/24 Brain MRI: No new metastatic lesions. No significant change in supratentorial and infratentorial subcentimeter metastatic enhancing lesions. Stable right inferior frontal and left high frontal postsurgical changes.     12/22-current: lost to follow up due to insurance issues    3/15/24- CT chest- Stable nodule medial right lower lobe. No metastatic disease demonstrated.      Interval Hx:  Feeling well overall  Having gradually worse right sided pain over the past few weeks; out of oxycodone. Pain is worse with certain movements.  Taking brigatinib as instructed  Increased cravings for sweets; states he will go periods without eating due to decreased appetite and then craves sugary foods. His wife has tried keeping sugary foods out of the house.  Does not frequently check blood sugars at home  Would like to start drinking protein shakes with meals daily  Notes he falls asleep around 5:30pm in the evenings and sleeps through the night; states he does no feel he gets adequate rest despite getting significant amounts of sleep.  Headaches have resolved  Has multiple refill requests today  Recently spent time in [place] with his father which he enjoyed  Has increased anxiety about his treatment plan; states he is nervous he may need surgery. Has already requested 4 months off of work for possible surgery. States he would prefer to undergo radiation therapy if recommended.    ECOG PS 0-1    REVIEW OF SYSTEMS: 14 point ROS negative other than the symptoms noted above in the HPI.    Wt Readings from Last 4 Encounters:   06/05/24 98.3 kg (216 lb 11.2 oz)    03/21/24 98.4 kg (217 lb)   03/20/24 98.7 kg (217 lb 11.2 oz)   02/26/24 98.1 kg (216 lb 4.8 oz)      Review of Systems:  A comprehensive ROS was performed and found to be negative or non-contributory with the exception of that noted in the HPI above.    Past Medical History:  GERD  Hypertension, not on medication  Type 2 diabetes mellitus, not on medications currently, previously on Metformin    Past Surgical History:  Past Surgical History:   Procedure Laterality Date    BRONCHOSCOPY RIGID OR FLEXIBLE W/TRANSENDOSCOPIC ENDOBRONCHIAL ULTRASOUND GUIDED Bilateral 1/26/2022    Procedure: Right BRONCHOSCOPY, FIBEROPTIC, endobronchial ultrasound, pleural biopsy;  Surgeon: Dallin Agrawal MD;  Location: [facility] OR    INJECT BLOCK MEDIAL BRANCH CERVICAL/THORACIC/LUMBAR      INSERT CHEST TUBE Right 2/16/2022    Procedure: INSERTION, CATHETER, INTERCOSTAL, FOR DRAINAGE;  Surgeon: Dallin Agrawal MD;  Location: [facility] GI    INSERT CHEST TUBE Right 3/9/2022    Procedure: INSERTION, CATHETER, INTERCOSTAL, FOR DRAINAGE;  Surgeon: Sushila Antonio MD;  Location: [facility] GI    IR CHEST TUBE REMOVAL TUNNELED RIGHT  4/2/2022    OPTICAL TRACKING SYSTEM CRANIOTOMY, EXCISE TUMOR, COMBINED Left 6/28/2022    Procedure: Left stealth craniotomy for tumor resection with motor mapping;  Surgeon: Stephen Moran MD;  Location:  OR    OPTICAL TRACKING SYSTEM CRANIOTOMY, EXCISE TUMOR, COMBINED Right 6/27/2023    Procedure: Right stealth craniotomy and resection of tumor;  Surgeon: Stephen Moran MD;  Location:  OR    ORTHOPEDIC SURGERY      Ganesh. Rotator cuff repair.    PLEUROSCOPY N/A 1/26/2022    Procedure: Pleuroscopy with Pleural Biopsy;  Surgeon: Dallin Agrawal MD;  Location:  OR       Social History:  Lives with wife and 4 kids in [place]. Works as a  for an apartment complex in [place]. Exposure to household chemicals and . No significant exposure to asbestos. No signal exposure to benzene or  similar chemicals. No significant smoking history-states that he smoked 1 to 2 cigarettes occasionally per month for about 2 years in college, non-smoking since then. No significant alcohol use history. No other recreational substances. Good support system. Kids are 23, 19, 17 and 13.    Family History  Significant history for cancers on maternal side. Mother  of uterine cancer. 2 maternal uncles have possible metastatic melanoma.    Outpatient Medications:  Current Outpatient Medications   Medication Sig Dispense Refill    acetaminophen (TYLENOL) 325 MG tablet Take 325-650 mg by mouth every 6 hours as needed for mild pain      albuterol (PROAIR HFA/PROVENTIL HFA/VENTOLIN HFA) 108 (90 Base) MCG/ACT inhaler Inhale 2 puffs into the lungs every 6 hours as needed for shortness of breath, wheezing or cough 18 g 0    Alcohol Swabs PADS Use to swab the area of the injection or jabier as directed. Per insurance coverage 100 each 0    baclofen (LIORESAL) 10 MG tablet Take 0.5-1 tablets (5-10 mg) by mouth 3 times daily as needed for other (hiccups) 60 tablet 4    blood glucose (NO BRAND SPECIFIED) lancets standard To use to test glucose level in the blood Use to test blood sugar  4  times daily as directed. To accompany glucose monitor brands per insurance coverage. 100 each 3    blood glucose (NO BRAND SPECIFIED) test strip To use to test glucose level in the blood Use to test blood sugar  4 times daily as directed. To accompany glucose monitor brands per insurance coverage. 100 strip 3    Blood Glucose Monitoring Suppl (ACCU-CHEK GUIDE) w/Device KIT       brigatinib (ALUNBRIG) 30 MG TABS tablet Take 4 tablets (120 mg) by mouth daily May be taken with or without food. Swallow whole. Do not crush or chew tablets. 120 tablet 0    DULoxetine (CYMBALTA) 30 MG capsule Take 30 mg by mouth 2 times daily      hydrALAZINE (APRESOLINE) 25 MG tablet Take 1 tablet (25 mg) by mouth 3 times daily 90 tablet 1    insulin aspart (NOVOLOG  PEN) 100 UNIT/ML pen Inject 0-40 Units Subcutaneous 3 times daily (before meals) Per discharge instructions sliding scale 45 mL 2    insulin pen needle (32G X 4 MM) 32G X 4 MM miscellaneous Use as directed by provider. Per insurance coverage 100 each 0    levETIRAcetam (KEPPRA) 1000 MG tablet Take 1 tablet (1,000 mg) by mouth 2 times daily 60 tablet 11    lisinopril (ZESTRIL) 40 MG tablet Take 1 tablet (40 mg) by mouth daily 90 tablet 1    methadone (DOLOPHINE) 5 MG tablet Take 1 tablet (5 mg) by mouth 2 times daily 60 tablet 0    methylphenidate (RITALIN) 5 MG tablet Take 1-2 tablets (5-10 mg) by mouth 2 times daily as needed (fatigue) Take second dose by 12 noon, if it is needed 90 tablet 0    oxyCODONE IR (ROXICODONE) 10 MG tablet Take 1 tablet (10 mg) by mouth every 3 hours as needed for moderate pain 90 tablet 0    propranolol (INDERAL) 20 MG tablet Take 2 tablets (40 mg) by mouth 3 times daily 180 tablet 1    hydrochlorothiazide (HYDRODIURIL) 50 MG tablet Take 1 tablet (50 mg) by mouth daily 30 tablet 1    insulin glargine (LANTUS PEN) 100 UNIT/ML pen Inject 71 Units Subcutaneous every morning 15 mL 0    lidocaine-prilocaine (EMLA) 2.5-2.5 % external cream Apply topically as needed (pain due to port access) Apply to port 30 minutes prior to lab appointment. (Patient not taking: Reported on 6/5/2024) 30 g 6    naloxone (NARCAN) 4 MG/0.1ML nasal spray Spray 1 spray (4 mg) into one nostril alternating nostrils as needed for opioid reversal every 2-3 minutes until assistance arrives (Patient not taking: Reported on 6/5/2024) 0.2 mL 3    rivaroxaban ANTICOAGULANT (XARELTO) 20 MG TABS tablet Take 1 tablet (20 mg) by mouth daily (with dinner) 90 tablet 3     No current facility-administered medications for this visit.     BP (!) 168/109   Pulse 99   Temp 98.3  F (36.8  C) (Oral)   Resp 16   Wt 98.3 kg (216 lb 11.2 oz)   SpO2 98%   BMI 32.00 kg/m    General: No acute distress, no rashes on skin. Tenderness to  right upper flank with deep palpation. No erythema or obvious masses visualized. Tissue density palpable, but is somewhat mobile and soft.  HEENT: Sclera anicteric. Oral mucosa pink and moist.  No mucositis or thrush  Heart: Regular, rate, and rhythm  Lungs: Clear to ascultation bilaterally. Breathing comfortably  Abdomen: Positive bowel sounds. Soft, non-distended, non-tender.   Extremities: no lower extremity edema  Neuro: Cranial nerves grossly intact      Labs & Studies: I personally reviewed the following studies:  Most Recent 3 CBC's:  Recent Labs   Lab Test 03/20/24  1632 12/28/23  0755 12/27/23  1512   WBC 11.8* 11.5* 10.7   HGB 15.0 16.3 16.3   MCV 90 91 89    214 231     Most Recent 3 BMP's:  Recent Labs   Lab Test 06/05/24  1347 03/20/24  1632 02/26/24  1005    142 143   POTASSIUM 4.0 3.9 4.4   CHLORIDE 104 103 104   CO2 25 27 29   BUN 14.8 18.5 18.8   CR 0.67 0.71 0.69   ANIONGAP 11 12 10   TORY 9.2 9.3 9.9   *  292* 175* 262*  262*    Most Recent 2 LFT's:  Recent Labs   Lab Test 06/05/24  1347 03/20/24  1632   AST 18 17   ALT 16 16   ALKPHOS 107 100   BILITOTAL 0.7 0.4    Most Recent TSH and T4:  Recent Labs   Lab Test 09/12/22  1642   TSH 2.56        ASSESSMENT AND PLAN:  Stage IV NSCLC, Rt lung adenocarcinoma with metastasis to pleura, mediastinum , rt pleural effusion and brain diagnosed 1/2022 (AJCC 8th edition)  PD-L1 TPS 2-3% by [place]   NGS [facility] panel-EML4:ALK rearragement; chr2:[id number], chr2:[id number]  NGS [facility]- GNAS R201H, KRAS K5E- No ALK    He began Alectinib 600 mg BID 3/2/22 and unfortunately developed grade 3 myalgias which have improved with lowering the dose 450 mg BID. He was holding drug 4/2/22 to 4/11/22 due to MSSA infection from pleurex which is removed. Resumed at 450 mg BID. Due to ongoing myalgias (Although CK is normal), we dose reduced to 300 mg BID.   In Dec 2022, developed Gr 3 arthralgia, and we stopped drug 12/20/22. Had unremitting  arthalgias, eventully had to starte PO steroids which led to improvement and resolved. Radiographicaly, he has had a near CR to Rx in the lung, has a residual rt LL lesion.     Began brigatinib 2/22/23 (delayed due to pt hesitancy). Developed severe cough on day 2 so brigatinib was held and cough resolved with in 24-48 hours. He restarted 60 mg daily and upped it to 90 mg.Restging CT showing stable disease in the lung, however, MRI with several contrast enhancement and increase in size of previously treated lesions. His dose was increased to 180 mg daily to address possible CNS disease progression and started on dexamethasone. 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 CT in July 20123 showing stable disease.   Following surgery, we reduced to 120 mg/day due to worsening joint aches/stiffness. Restaging CT in 9/2023 showing overall disease stablity with no evidence of active cancer. We opted to continue Brigatinib at 120 mg.     PET/CT after these three months showed oligoperisstent disease with a single focus of active malignancy in the RLL. Met with pulm 12/2023 to discuss resection but they recommended against it due to risk for scarring after. He has not yet met with radiation for SBRT for more definitive disease control of the oligopersistent disease; at this time will plan to defer until after CT CAP and Brain MRI.    Plan  - Continue brigatinib at 120 mg  - CT CAP 6/21/2024  - Brain MRI before visit with Dr. Ng  - RTC with Dr. Ng after scans  -Follow up with Dr. Fu end of June    # Brain mets:   # Radiation necrosis, improving  -Baseline Brain MRI with several brain mets, s/p GK to 11-12 brain mets. F/u Brain MRI in June 2022 was showing enlargement of the one of the lesions along with edema, therefore had to undergo craniotomy followed by resection, the final biopsy consistent with radiation necrosis.   -received two doses bevacizumab  for radiation necrosis in Fall 2022, tolerated poorly with HTN urgency and PE.)   -MRI in 4/2023 now showing contrast enhancement of lesions and a dominate lesion in the R frontal region with edema, several other smaller lesion. Short term MRI showing increase in size of the rt frontal lesion, underwent resection, patho again showing radiation necrosis. Restarted bevacizumab, which resulted in improved radiation necrosis / vasogenic edema on imaging in 10/2023 but ultimately was stopped due to uncontrolled HTN, last dose being 11/2023  -MRI imaging 1/2024 with stable disease and no edema.  -Brain MRI 3/2024 cancelled due to lapse in medical insurance.  -Ordered Brain MRI to be completed before visit with Dr. Ng    #Right pleuritic/chest pain  Felt to be 2/2 prior pleurex drain. No acute resp changes today. Believes pain is gradually worsening, but has also been out of prn oxycodone.  -Has 3 days methadone left; followed by Palliative Care and we were able to secure follow up tomorrow     #Elevated Lipase  -mild elevation. No concern on exam    #Diabetes, Type 2  Hyperglycemia today on labs. Has been out of insulin for 3 days. Discussed dietary recommendations as it seems he has been eating a lot of sugary foods lately.   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  -stable at this time with pain mgmt    #HTN: elevated today but much improved since HTN urgency levels while he was on Fe.   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 Due to significant time spent with medication management, will monitor at this time.  Recheck 6/12 as scheduled      ALEJANDRO Rea    The patient was seen in conjunction with Boni Sharpe who served as a scribe for today's visit. I have reviewed and edited the above note, and agree with the above findings and plan.      40 minutes spent on the date of the encounter doing chart review, review of test results, interpretation of tests, patient visit, and documentation       Chelsea Villegas CNP on 6/5/2024 at 3:59 PM

## 2024-06-05 NOTE — TELEPHONE ENCOUNTER
Met with Pt to reconcile med refills and facilitate Pt visit appts. Arranged for Pt to see palliative tomorrow to review methadone, oxycodone, and ritalin.   Established that Pt had 7 refills of Kepra @ preferred pharmacy. Arranged for Pt to see PCP, Dr Mohr, on 6/19 @ 1040 am to manage diabetes. Filled gap Rx for insulin,  refilled Hydroclorothiazide and xarelto per [place] [facility]

## 2024-06-05 NOTE — NURSING NOTE
"Oncology Rooming Note    June 5, 2024 2:16 PM   Connor Emerson is a 44 year old male who presents for:    Chief Complaint   Patient presents with    Blood Draw     Labs drawn via  by RN in lab.  VS taken    Oncology Clinic Visit     Non-small cell lung cancer     Initial Vitals: BP (!) 168/109   Pulse 99   Temp 98.3  F (36.8  C) (Oral)   Resp 16   Wt 98.3 kg (216 lb 11.2 oz)   SpO2 98%   BMI 32.00 kg/m   Estimated body mass index is 32 kg/m  as calculated from the following:    Height as of 3/21/24: 1.753 m (5' 9\").    Weight as of this encounter: 98.3 kg (216 lb 11.2 oz). Body surface area is 2.19 meters squared.  Severe Pain (7) Comment: Data Unavailable   No LMP for male patient.  Allergies reviewed: Yes  Medications reviewed: Yes    Medications: Medication refills not needed today.  Pharmacy name entered into EPIC:    [facility] [place], [place] - [address] [facility] - [place], [place] - [address] [place] [address]  [facility] [place], [address] [facility] [place], [place] - [address] [place] [address] PHARMACY [place], [place] - [address] [place] [address] [address]  [facility] - [place], [place] - [address] [facility] [address]    Frailty Screening:   Is the patient here for a new oncology consult visit in cancer care? 2. No      Clinical concerns: Needs refills on hydrochlorothiazide, insulin, kepra, xarelto, methadone, maybe dexamethasone- though not on med list, ritalin  Understands that he may need to call primary for some   Jeanine Young              "

## 2024-06-05 NOTE — NURSING NOTE
Chief Complaint   Patient presents with    Blood Draw     Labs drawn via  by RN in lab.  VS taken     Labs collected from venipuncture by RN. Vitals taken. Checked in for appointment(s).    Dayan Hickman RN

## 2024-06-05 NOTE — NURSING NOTE
[id number]: Informed Consent Note     The consent form, including purpose, risks and benefits, was reviewed with Connor Emerson, and all questions were answered before he signed the consent form. The patient understands that the study involves a single study time point in which research bio specimens are collected and questionnaire is completed.      A copy of the signed form was provided to the patient. No procedures specific to this study were performed prior to the patient signing the consent form.    Consent Version Date: [id number]  Consent obtained by: Jannet Carvajal      Date: 05JUN2024  HIPAA authorization signed?: yes  HIPAA authorization version date: [id number]    Jannet Carvajal    Form 503.03.01 (Version 2)     Effective date: 01AUG2018     Next Review Date: 01AUG2020

## 2024-06-06 ENCOUNTER — MYC REFILL (OUTPATIENT)
Dept: PALLIATIVE CARE | Facility: CLINIC | Age: 44
End: 2024-06-06

## 2024-06-06 ENCOUNTER — VIRTUAL VISIT (OUTPATIENT)
Dept: RADIATION ONCOLOGY | Facility: CLINIC | Age: 44
End: 2024-06-06
Attending: FAMILY MEDICINE
Payer: MEDICAID

## 2024-06-06 VITALS — HEIGHT: 69 IN | WEIGHT: 214 LBS | BODY MASS INDEX: 31.7 KG/M2

## 2024-06-06 DIAGNOSIS — C79.31 MALIGNANT NEOPLASM METASTATIC TO BRAIN (H): ICD-10-CM

## 2024-06-06 DIAGNOSIS — D49.6 BRAIN TUMOR (H): ICD-10-CM

## 2024-06-06 DIAGNOSIS — G89.3 CANCER ASSOCIATED PAIN: ICD-10-CM

## 2024-06-06 DIAGNOSIS — Z98.890 S/P CRANIOTOMY: ICD-10-CM

## 2024-06-06 DIAGNOSIS — Z51.5 PALLIATIVE CARE PATIENT: Primary | ICD-10-CM

## 2024-06-06 DIAGNOSIS — R53.0 NEOPLASTIC MALIGNANT RELATED FATIGUE: ICD-10-CM

## 2024-06-06 DIAGNOSIS — C34.90 NON-SMALL CELL LUNG CANCER, UNSPECIFIED LATERALITY (H): ICD-10-CM

## 2024-06-06 PROCEDURE — 99214 OFFICE O/P EST MOD 30 MIN: CPT | Mod: 95 | Performed by: FAMILY MEDICINE

## 2024-06-06 RX ORDER — METHYLPHENIDATE HYDROCHLORIDE 5 MG/1
5-10 TABLET ORAL 2 TIMES DAILY PRN
Qty: 90 TABLET | Refills: 0 | Status: SHIPPED | OUTPATIENT
Start: 2024-06-06 | End: 2024-08-08

## 2024-06-06 RX ORDER — METHADONE HYDROCHLORIDE 5 MG/1
7.5 TABLET ORAL 2 TIMES DAILY
Qty: 90 TABLET | Refills: 0 | Status: SHIPPED | OUTPATIENT
Start: 2024-06-06 | End: 2024-10-02

## 2024-06-06 RX ORDER — OXYCODONE HYDROCHLORIDE 10 MG/1
10 TABLET ORAL
Qty: 90 TABLET | Refills: 0 | Status: SHIPPED | OUTPATIENT
Start: 2024-06-10 | End: 2024-06-24

## 2024-06-06 ASSESSMENT — PAIN SCALES - GENERAL: PAINLEVEL: SEVERE PAIN (7)

## 2024-06-06 NOTE — PROGRESS NOTES
Virtual Visit Details    Type of service:  Video Visit   Video Start Time: 11:27 AM  Video End Time:11:55 AM    Originating Location (pt. Location): Other work    Distant Location (provider location):  On-site  Platform used for Video Visit: [facility]    Palliative Care Outpatient Clinic Progress Note    Patient Name: Connor Emerson  Primary Provider: Bassam Persaud    Impression & Recommendations & Counseling:  Connor Emerson is a 44 year old male with history of NSCLC with ALK rearrangement and mets to the brain s/p gamma knife treatments and craniotomy open excision and currently on a second line TKI. He is experiencing 'stiffness' from the TKI and this is better overall and worse when he works. He has a lot of cancer related fatigue.  ECO  Decisional Capacity: very present     PDMP review:  Yes, no concerns     Metastatic NSCLC with ALK rearrangement  S/p GK to brain mets and craniotomy for excision  Cancer associated pain on Methadone 2.5 mg po in AM and 5 mg at HS and using prn oxycodone 10 mg about TID  TKI associated muscle stiffness with good response to HS flexeril  HTN  Headaches--overall better and Connor prefers to minimize use of steroids, if at all possible.  Cancer associated fatigue     Goals of Care:  2024  Connor feels like he is living on borrowed time--he was once told he had to live until about 2023 and he worries this may be his last summer and he doesn't want to spend it recovering from surgery or feeling poorly.  So he is going to do a PET scan and if it is negative he for sure would not want surgery.  He is swayed by surgeons feeling it is scar tissue from his previous chest tube.  If there is recurrent tumor Connor would consider radiation therapy and he is also thinking it might be time to put his focus on a comfortable death.  He seems to have a very sound decision making framework and he's just missing some data at this time.  2023  no change in GOC  3/1/2023 Connor  wants to continue cancer-directed therapies as long as the side effects are tolerable.  He is a FULL code should he have a cardiac arrest. He is OK being cared for in the acute care hospital if needed.     Recommendations & Counseling:  Continue cancer care per Dr. Persaud and his team  INCREASE Methadone TO 7.5 MG PO bid-- NEW RX SENT  Continue oxycodone 5-10 mg po q 4 hours prn;  Continue to hold Ambien   Continue Duloxetine.  Narcan nasal spray rx also sent to pharmacy last visit     CONTINUE  Ritalin 5-10 mg po when first rising and at noon, if needed. These directions were clarified today. New rx sent.     Start flexeril 5 mg at HS to see if it helps morning stiffness;   [facility] pharmacy resources did not indicate a drug-drug interaction with methadone.     F/up in 4 weeks and sooner prn. RNCC symptom check in 10 days.        Counseling: All of the above was explained to the patient in lay language. The patient has verbalized a clear understanding of the discussion, asked appropriate questions, which have been answered to patient's apparent satisfaction. The patient is in agreement with the above plan.        Chief Complaint/Patient ID: Connor Emerson 43 year old male with PMHx of  NSCLC with ALK rearrangement and mets to the brain s/p gamma knife treatments and craniotomy open excision and currently on a second line TKI. He is experiencing 'stiffness' from the TKI that has improved with scheduled flexeril at HS.    Last Palliative care appointment: 03/21/2024 with me     Reviewed:  Yes:   reviewed - controlled substances reflected in medication list.    Interim History:  Connor Emerson is a 44 year old male who is seen today for follow up with Palliative Care via billable video visit.  He has some new right sided chest pain and is scheduled for a PET next week.  He wants to explore XRT vs excisional biopsy     Pain:  in right lower chest where he had Chest Tube and there is concern for recurrence of  tumor.    Appetite/Nausea: good     Bowels: no concerns     Sleep: 'too much' due to CRF     Mood: ok at this time    Fatigue:  some relief with Ritalin once a day, and we discussed using first thing in AM and then around noon     Coping:  overall OK; he is weathering this period of diagnostic uncertainty well.    Family History- Reviewed in Epic.    Allergies   Allergen Reactions    Vicodin [Hydrocodone-Acetaminophen] Nausea and Vomiting and GI Disturbance       Social History:  Pertinent changes to social history/social situation since last visit: none; daughter is facing some extensive back surgery in July  Key support resources: family  Advance Directive Status:  no ACP documents in Epic    Social History     Tobacco Use    Smoking status: Never     Passive exposure: Never    Smokeless tobacco: Never   Vaping Use    Vaping status: Never Used   Substance Use Topics    Alcohol use: Yes     Alcohol/week: 0.0 standard drinks of alcohol     Comment: social    Drug use: No         Allergies   Allergen Reactions    Vicodin [Hydrocodone-Acetaminophen] Nausea and Vomiting and GI Disturbance     Current Outpatient Medications   Medication Sig Dispense Refill    acetaminophen (TYLENOL) 325 MG tablet Take 325-650 mg by mouth every 6 hours as needed for mild pain      albuterol (PROAIR HFA/PROVENTIL HFA/VENTOLIN HFA) 108 (90 Base) MCG/ACT inhaler Inhale 2 puffs into the lungs every 6 hours as needed for shortness of breath, wheezing or cough 18 g 0    Alcohol Swabs PADS Use to swab the area of the injection or jabier as directed. Per insurance coverage 100 each 0    baclofen (LIORESAL) 10 MG tablet Take 0.5-1 tablets (5-10 mg) by mouth 3 times daily as needed for other (hiccups) 60 tablet 4    blood glucose (NO BRAND SPECIFIED) lancets standard To use to test glucose level in the blood Use to test blood sugar  4  times daily as directed. To accompany glucose monitor brands per insurance coverage. 100 each 3    blood glucose  (NO BRAND SPECIFIED) test strip To use to test glucose level in the blood Use to test blood sugar  4 times daily as directed. To accompany glucose monitor brands per insurance coverage. 100 strip 3    Blood Glucose Monitoring Suppl (ACCU-CHEK GUIDE) w/Device KIT       brigatinib (ALUNBRIG) 30 MG TABS tablet Take 4 tablets (120 mg) by mouth daily May be taken with or without food. Swallow whole. Do not crush or chew tablets. 120 tablet 0    DULoxetine (CYMBALTA) 30 MG capsule Take 30 mg by mouth 2 times daily      hydrALAZINE (APRESOLINE) 25 MG tablet Take 1 tablet (25 mg) by mouth 3 times daily 90 tablet 1    hydrochlorothiazide (HYDRODIURIL) 50 MG tablet Take 1 tablet (50 mg) by mouth daily 30 tablet 1    insulin aspart (NOVOLOG PEN) 100 UNIT/ML pen Inject 0-40 Units Subcutaneous 3 times daily (before meals) Per discharge instructions sliding scale 45 mL 2    insulin glargine (LANTUS PEN) 100 UNIT/ML pen Inject 71 Units Subcutaneous every morning 15 mL 0    insulin pen needle (32G X 4 MM) 32G X 4 MM miscellaneous Use as directed by provider. Per insurance coverage 100 each 0    levETIRAcetam (KEPPRA) 1000 MG tablet Take 1 tablet (1,000 mg) by mouth 2 times daily 60 tablet 11    lidocaine-prilocaine (EMLA) 2.5-2.5 % external cream Apply topically as needed (pain due to port access) Apply to port 30 minutes prior to lab appointment. (Patient not taking: Reported on 6/5/2024) 30 g 6    lisinopril (ZESTRIL) 40 MG tablet Take 1 tablet (40 mg) by mouth daily 90 tablet 1    methadone (DOLOPHINE) 5 MG tablet Take 1 tablet (5 mg) by mouth 2 times daily 60 tablet 0    methylphenidate (RITALIN) 5 MG tablet Take 1-2 tablets (5-10 mg) by mouth 2 times daily as needed (fatigue) Take second dose by 12 noon, if it is needed 90 tablet 0    naloxone (NARCAN) 4 MG/0.1ML nasal spray Spray 1 spray (4 mg) into one nostril alternating nostrils as needed for opioid reversal every 2-3 minutes until assistance arrives (Patient not taking:  Reported on 6/5/2024) 0.2 mL 3    oxyCODONE IR (ROXICODONE) 10 MG tablet Take 1 tablet (10 mg) by mouth every 3 hours as needed for moderate pain 90 tablet 0    propranolol (INDERAL) 20 MG tablet Take 2 tablets (40 mg) by mouth 3 times daily 180 tablet 1    rivaroxaban ANTICOAGULANT (XARELTO) 20 MG TABS tablet Take 1 tablet (20 mg) by mouth daily (with dinner) 90 tablet 3     Past Medical History:   Diagnosis Date    Atypical chest pain 12/02/2013    Cancer (H)     Complication of anesthesia     Diabetes (H)     GERD (gastroesophageal reflux disease) 12/02/2013    History of pulmonary embolism     HTN (hypertension) 05/14/2012    HTN, goal below 140/90 07/02/2013    Insomnia 02/21/2012    Mediastinal lymphadenopathy     Migraine headache 07/02/2013    Migraine with aura, without mention of intractable migraine without mention of status migrainosus     Pneumonia      Past Surgical History:   Procedure Laterality Date    BRONCHOSCOPY RIGID OR FLEXIBLE W/TRANSENDOSCOPIC ENDOBRONCHIAL ULTRASOUND GUIDED Bilateral 1/26/2022    Procedure: Right BRONCHOSCOPY, FIBEROPTIC, endobronchial ultrasound, pleural biopsy;  Surgeon: Dallin Agrawal MD;  Location: [facility] OR    INJECT BLOCK MEDIAL BRANCH CERVICAL/THORACIC/LUMBAR      INSERT CHEST TUBE Right 2/16/2022    Procedure: INSERTION, CATHETER, INTERCOSTAL, FOR DRAINAGE;  Surgeon: Dallin Agrawal MD;  Location: [facility] GI    INSERT CHEST TUBE Right 3/9/2022    Procedure: INSERTION, CATHETER, INTERCOSTAL, FOR DRAINAGE;  Surgeon: Sushila Antonio MD;  Location: [facility] GI    IR CHEST TUBE REMOVAL TUNNELED RIGHT  4/2/2022    OPTICAL TRACKING SYSTEM CRANIOTOMY, EXCISE TUMOR, COMBINED Left 6/28/2022    Procedure: Left stealth craniotomy for tumor resection with motor mapping;  Surgeon: Setphen Moran MD;  Location:  OR    OPTICAL TRACKING SYSTEM CRANIOTOMY, EXCISE TUMOR, COMBINED Right 6/27/2023    Procedure: Right stealth craniotomy and resection of tumor;  Surgeon: Stephen Moran,  MD;  Location:  OR    ORTHOPEDIC SURGERY      Ganesh. Rotator cuff repair.    PLEUROSCOPY N/A 1/26/2022    Procedure: Pleuroscopy with Pleural Biopsy;  Surgeon: Dallin Agrawal MD;  [facility]       Physical Exam:   GENERAL APPEARANCE: healthy, alert and no distress; neatly groomed  EYES: Eyes grossly normal to inspection, PERRLA, conjunctivae and sclerae without injection or discharge, EOM intact   RESP:  no increased work of breathing; speaks in complete sentences;   MS: No musculoskeletal defects are noted  SKIN: No suspicious lesions or rashes, hydration status appears adequate with normal skin turgor   PSYCH: Alert and oriented x3; speech- coherent , normal rate and volume; able to articulate logical thoughts, able to abstract reason, no tangential thoughts, no hallucinations or delusions, mentation appears normal, Mood is euthymic. Affect is appropriate for this mood state and bright. Thought content is free of suicidal ideation, hallucinations, and delusions.  Eye contact is good during conversation.       Key Data Reviewed:  LABS: 06/05/2024- Cr 0.67, Albumin 4.5,  Hgb 15,      IMAGING: no recent imaging for review    MDM high with monitoring several chronic conditions; med adjustment with high risk (Methadone) and goals of care clarification.    Ajith Brewer MD MS FAAFP CAQHPM  [facility]  Office [phone]  Fax [phone]

## 2024-06-06 NOTE — PATIENT INSTRUCTIONS
It was good to see you today, Connor.    Here are the things we talked about:  Increase the methadone to 1 and 1/2 tablets twice a day.  Use the ritalin 5 mg for sure first thing in the morning and then you can use a second dose around noon if needed.    Keep using the ibuprofen and oxycodone as needed for breakthrough pain.    Someone from the team will reach out to schedule a follow up appointment in 4-6 weeks  Reyna will call to check on your symptoms in about 10 days.     How to get a hold of us:  For non-urgent matters, MyChart works best.    For more urgent matters, or if you prefer not to use MyChart, call our clinic nurse coordinator Reyna Ma RN at [phone]    We have an on-call number for evenings and weekends. Please call this only if you are having uncontrolled symptoms or serious side effects from your medicines: [phone].     For refills, please give us a week (5 working days) notice. We don't always have providers available everyday to do refills. If you call the day you run out of your medicine, we may not be able to refill it in time, so call 5 days in advance!    Ajith Brewer MD MS FAAFP CAQHPM  [facility]  Office [phone]  Fax [phone]

## 2024-06-06 NOTE — NURSING NOTE
Is the patient currently in the state of [place]? YES    Visit mode:VIDEO    If the visit is dropped, the patient can be reconnected by: VIDEO VISIT: Text to cell phone:   Telephone Information:   Mobile [phone]       Will anyone else be joining the visit? NO  (If patient encounters technical issues they should call [phone]    How would you like to obtain your AVS? MyChart    Are changes needed to the allergy or medication list? No    Are refills needed on medications prescribed by this physician? NO    Reason for visit: RADU OCONNOR

## 2024-06-06 NOTE — TELEPHONE ENCOUNTER
Received [employer] message from patient requesting refill of oxycodone.     Last refill: 5/30/24  Last office visit: 6/6/24  Scheduled for follow up per check out request.     Will route request to MD for review.     Reviewed [place]  Report.

## 2024-06-07 ENCOUNTER — TELEPHONE (OUTPATIENT)
Dept: PALLIATIVE CARE | Facility: CLINIC | Age: 44
End: 2024-06-07

## 2024-06-07 NOTE — TELEPHONE ENCOUNTER
Retail Pharmacy Prior Authorization Team   Phone: [phone]    PA Initiation    Medication: METHADONE HCL 5 MG PO TABS  Insurance Company: [place] Medicaid ([facility]) - Phone [phone] Fax [phone]  Pharmacy Filling the Rx: [place], [place] - [address]  Filling Pharmacy Phone: [phone]  Filling Pharmacy Fax: [phone]  Start Date: 6/7/2024

## 2024-06-07 NOTE — TELEPHONE ENCOUNTER
Retail Pharmacy Prior Authorization Team   Phone: [phone]    PA Initiation    Medication: METHADONE HCL 5 MG PO TABS  Insurance Company: [place] Medicaid ([facility]) - Phone [phone] Fax [phone]  Pharmacy Filling the Rx: [place], [place] - [address]  Filling Pharmacy Phone: [phone]  Filling Pharmacy Fax: [phone]  Start Date: 6/7/2024

## 2024-06-10 DIAGNOSIS — C34.31 MALIGNANT NEOPLASM OF LOWER LOBE OF RIGHT LUNG (H): Primary | ICD-10-CM

## 2024-06-11 ENCOUNTER — TELEPHONE (OUTPATIENT)
Dept: ONCOLOGY | Facility: CLINIC | Age: 44
End: 2024-06-11
Payer: MEDICAID

## 2024-06-11 DIAGNOSIS — C34.31 MALIGNANT NEOPLASM OF LOWER LOBE OF RIGHT LUNG (H): Primary | ICD-10-CM

## 2024-06-11 NOTE — TELEPHONE ENCOUNTER
Prior Authorization Approval    Medication: METHADONE HCL 5 MG PO TABS  Authorization Effective Date: 6/7/2024  Authorization Expiration Date: 6/30/2024  Approved Dose/Quantity:   Reference #:   [id number]  Insurance Company: [place] Medicaid ([facility]) - Phone [phone] Fax [phone]  Expected CoPay: $    CoPay Card Available:      Financial Assistance Needed:   Which Pharmacy is filling the prescription: [facility] [place]  [place] - [address] [address]  Pharmacy Notified: Yes  Patient Notified:

## 2024-06-11 NOTE — TELEPHONE ENCOUNTER
Oral Chemotherapy Monitoring Program     Placed call to patient in follow up of Alunbrig oral chemotherapy. Pt's last two lab draws have shown G3 lipase elevation. Per discussion with Stella Navarro, pt to hold Alunbrig x1wk, and repeat labs. If lipase comes down to grade 1 or better, pt can restart the Alunbrig.    Pt verbalized understanding of the plan.    Will have scheduling reach out to reschedule his labs from 6/12 to 6/19.    Grace Myhre, PharmD  Hematology/Oncology Clinical Pharmacist  [facility]  [phone]

## 2024-06-12 DIAGNOSIS — C34.31 MALIGNANT NEOPLASM OF LOWER LOBE OF RIGHT LUNG (H): Primary | ICD-10-CM

## 2024-06-18 ENCOUNTER — MYC REFILL (OUTPATIENT)
Dept: ONCOLOGY | Facility: CLINIC | Age: 44
End: 2024-06-18
Payer: MEDICAID

## 2024-06-18 DIAGNOSIS — R51.9 NONINTRACTABLE EPISODIC HEADACHE, UNSPECIFIED HEADACHE TYPE: ICD-10-CM

## 2024-06-19 ENCOUNTER — LAB (OUTPATIENT)
Dept: LAB | Facility: CLINIC | Age: 44
End: 2024-06-19
Payer: MEDICAID

## 2024-06-19 ENCOUNTER — OFFICE VISIT (OUTPATIENT)
Dept: FAMILY MEDICINE | Facility: CLINIC | Age: 44
End: 2024-06-19
Payer: MEDICAID

## 2024-06-19 VITALS
RESPIRATION RATE: 16 BRPM | BODY MASS INDEX: 32.15 KG/M2 | SYSTOLIC BLOOD PRESSURE: 140 MMHG | TEMPERATURE: 98.6 F | HEART RATE: 77 BPM | OXYGEN SATURATION: 95 % | HEIGHT: 69 IN | WEIGHT: 217.1 LBS | DIASTOLIC BLOOD PRESSURE: 101 MMHG

## 2024-06-19 DIAGNOSIS — C34.31 MALIGNANT NEOPLASM OF LOWER LOBE OF RIGHT LUNG (H): Primary | ICD-10-CM

## 2024-06-19 DIAGNOSIS — C34.90 NON-SMALL CELL LUNG CANCER (NSCLC) (H): ICD-10-CM

## 2024-06-19 DIAGNOSIS — C79.31 MALIGNANT NEOPLASM METASTATIC TO BRAIN (H): ICD-10-CM

## 2024-06-19 DIAGNOSIS — E11.9 TYPE 2 DIABETES MELLITUS WITHOUT COMPLICATION, WITH LONG-TERM CURRENT USE OF INSULIN (H): Primary | ICD-10-CM

## 2024-06-19 DIAGNOSIS — Z79.4 TYPE 2 DIABETES MELLITUS WITHOUT COMPLICATION, WITH LONG-TERM CURRENT USE OF INSULIN (H): Primary | ICD-10-CM

## 2024-06-19 DIAGNOSIS — Z79.899 ENCOUNTER FOR LONG-TERM (CURRENT) USE OF MEDICATIONS: ICD-10-CM

## 2024-06-19 DIAGNOSIS — F41.9 ANXIETY: ICD-10-CM

## 2024-06-19 DIAGNOSIS — I10 BENIGN ESSENTIAL HYPERTENSION: ICD-10-CM

## 2024-06-19 DIAGNOSIS — F32.3 MAJOR DEPRESSIVE DISORDER, SINGLE EPISODE, SEVERE WITH PSYCHOTIC FEATURES (H): ICD-10-CM

## 2024-06-19 LAB
BASOPHILS # BLD AUTO: 0.1 10E3/UL (ref 0–0.2)
BASOPHILS NFR BLD AUTO: 1 %
EOSINOPHIL # BLD AUTO: 0.3 10E3/UL (ref 0–0.7)
EOSINOPHIL NFR BLD AUTO: 4 %
ERYTHROCYTE [DISTWIDTH] IN BLOOD BY AUTOMATED COUNT: 13.3 % (ref 10–15)
HBA1C MFR BLD: 10.6 % (ref 0–5.6)
HCT VFR BLD AUTO: 43.3 % (ref 40–53)
HGB BLD-MCNC: 14.9 G/DL (ref 13.3–17.7)
IMM GRANULOCYTES # BLD: 0 10E3/UL
IMM GRANULOCYTES NFR BLD: 0 %
LYMPHOCYTES # BLD AUTO: 2.3 10E3/UL (ref 0.8–5.3)
LYMPHOCYTES NFR BLD AUTO: 25 %
MCH RBC QN AUTO: 30.8 PG (ref 26.5–33)
MCHC RBC AUTO-ENTMCNC: 34.4 G/DL (ref 31.5–36.5)
MCV RBC AUTO: 90 FL (ref 78–100)
MONOCYTES # BLD AUTO: 0.7 10E3/UL (ref 0–1.3)
MONOCYTES NFR BLD AUTO: 7 %
NEUTROPHILS # BLD AUTO: 6.1 10E3/UL (ref 1.6–8.3)
NEUTROPHILS NFR BLD AUTO: 64 %
PLATELET # BLD AUTO: 249 10E3/UL (ref 150–450)
RBC # BLD AUTO: 4.83 10E6/UL (ref 4.4–5.9)
WBC # BLD AUTO: 9.5 10E3/UL (ref 4–11)

## 2024-06-19 PROCEDURE — 83036 HEMOGLOBIN GLYCOSYLATED A1C: CPT | Performed by: GENERAL PRACTICE

## 2024-06-19 PROCEDURE — 85025 COMPLETE CBC W/AUTO DIFF WBC: CPT

## 2024-06-19 PROCEDURE — 99214 OFFICE O/P EST MOD 30 MIN: CPT | Performed by: GENERAL PRACTICE

## 2024-06-19 PROCEDURE — 80053 COMPREHEN METABOLIC PANEL: CPT

## 2024-06-19 PROCEDURE — 82550 ASSAY OF CK (CPK): CPT

## 2024-06-19 PROCEDURE — 36415 COLL VENOUS BLD VENIPUNCTURE: CPT

## 2024-06-19 PROCEDURE — 96127 BRIEF EMOTIONAL/BEHAV ASSMT: CPT | Performed by: GENERAL PRACTICE

## 2024-06-19 PROCEDURE — 82043 UR ALBUMIN QUANTITATIVE: CPT | Performed by: GENERAL PRACTICE

## 2024-06-19 PROCEDURE — 82570 ASSAY OF URINE CREATININE: CPT | Performed by: GENERAL PRACTICE

## 2024-06-19 PROCEDURE — 83690 ASSAY OF LIPASE: CPT

## 2024-06-19 PROCEDURE — 82150 ASSAY OF AMYLASE: CPT

## 2024-06-19 RX ORDER — PROPRANOLOL HYDROCHLORIDE 20 MG/1
40 TABLET ORAL 3 TIMES DAILY
Qty: 180 TABLET | Refills: 1 | Status: ON HOLD | OUTPATIENT
Start: 2024-06-19 | End: 2024-07-25

## 2024-06-19 RX ORDER — DULOXETIN HYDROCHLORIDE 30 MG/1
30 CAPSULE, DELAYED RELEASE ORAL 2 TIMES DAILY
Qty: 60 CAPSULE | Refills: 2 | Status: SHIPPED | OUTPATIENT
Start: 2024-06-19 | End: 2024-09-16

## 2024-06-19 RX ORDER — HYDRALAZINE HYDROCHLORIDE 50 MG/1
50 TABLET, FILM COATED ORAL 2 TIMES DAILY
Qty: 180 TABLET | Refills: 3 | Status: ON HOLD | OUTPATIENT
Start: 2024-06-19 | End: 2024-07-25

## 2024-06-19 ASSESSMENT — ANXIETY QUESTIONNAIRES
GAD7 TOTAL SCORE: 21
IF YOU CHECKED OFF ANY PROBLEMS ON THIS QUESTIONNAIRE, HOW DIFFICULT HAVE THESE PROBLEMS MADE IT FOR YOU TO DO YOUR WORK, TAKE CARE OF THINGS AT HOME, OR GET ALONG WITH OTHER PEOPLE: EXTREMELY DIFFICULT
6. BECOMING EASILY ANNOYED OR IRRITABLE: NEARLY EVERY DAY
7. FEELING AFRAID AS IF SOMETHING AWFUL MIGHT HAPPEN: NEARLY EVERY DAY
4. TROUBLE RELAXING: NEARLY EVERY DAY
8. IF YOU CHECKED OFF ANY PROBLEMS, HOW DIFFICULT HAVE THESE MADE IT FOR YOU TO DO YOUR WORK, TAKE CARE OF THINGS AT HOME, OR GET ALONG WITH OTHER PEOPLE?: EXTREMELY DIFFICULT
3. WORRYING TOO MUCH ABOUT DIFFERENT THINGS: NEARLY EVERY DAY
GAD7 TOTAL SCORE: 21
1. FEELING NERVOUS, ANXIOUS, OR ON EDGE: NEARLY EVERY DAY
5. BEING SO RESTLESS THAT IT IS HARD TO SIT STILL: NEARLY EVERY DAY
7. FEELING AFRAID AS IF SOMETHING AWFUL MIGHT HAPPEN: NEARLY EVERY DAY
2. NOT BEING ABLE TO STOP OR CONTROL WORRYING: NEARLY EVERY DAY

## 2024-06-19 ASSESSMENT — PATIENT HEALTH QUESTIONNAIRE - PHQ9
SUM OF ALL RESPONSES TO PHQ QUESTIONS 1-9: 21
10. IF YOU CHECKED OFF ANY PROBLEMS, HOW DIFFICULT HAVE THESE PROBLEMS MADE IT FOR YOU TO DO YOUR WORK, TAKE CARE OF THINGS AT HOME, OR GET ALONG WITH OTHER PEOPLE: EXTREMELY DIFFICULT
SUM OF ALL RESPONSES TO PHQ QUESTIONS 1-9: 21

## 2024-06-19 ASSESSMENT — PAIN SCALES - GENERAL: PAINLEVEL: NO PAIN (0)

## 2024-06-19 NOTE — PATIENT INSTRUCTIONS
Start fluoxetine 20 mg daily.    Mychart in 4-6 weeks after starting the medication.     Stop hydralazine 25 mg three times daily.  Start hydralazine 50 mg twice daily.

## 2024-06-19 NOTE — TELEPHONE ENCOUNTER
Medication requested: duloxetine 30 mg capsule    Last prescribing provider: Pt Reported--ED     Last clinic visit date: 6/6/24 with Dr. Brewer; 6/5/24 with [place] Villegas CNP    Recommendations for requested medication (if none, N/A): NA    Any other pertinent information (if none, N/A): NA    Refilled: Y/N, if NO, why?    Pended and Routed to Care Team

## 2024-06-19 NOTE — TELEPHONE ENCOUNTER
Received electronic message from pharmacy requesting a new order for duloxetine.    Last office visit: 6/6/24  Scheduled for follow up 8/1/24     Will route request to MD for review.

## 2024-06-19 NOTE — TELEPHONE ENCOUNTER
Received [employer] message from patient requesting refill of propranolol.     Last office visit: 6/6/24  Scheduled for follow up 8/1/24     Will route request to MD for review.

## 2024-06-19 NOTE — PROGRESS NOTES
"  Assessment & Plan     Type 2 diabetes mellitus without complication, with long-term current use of insulin (H)  Uncontrolled  Goal A1C 8.5  - Albumin Random Urine Quantitative with Creat Ratio; Future  - HEMOGLOBIN A1C; Future  - insulin glargine (LANTUS PEN) 100 UNIT/ML pen; Inject 70 Units Subcutaneous every morning  - Albumin Random Urine Quantitative with Creat Ratio  - HEMOGLOBIN A1C  - **Hemoglobin A1c FUTURE 3mo; Future    Major depressive disorder, single episode, severe with psychotic features (H)  Severe due to lung cancer  Denies SI/HI  Seeing palliative care  Discussed therapy and selective serotonin reuptake inhibitor, patient is ok with fluoxetine.  Efficacy and SE discussed.    Mychart in 4-6 weeks after starting the fluoxetine.  Medication can take up to 4-6 weeks to work.   - FLUoxetine (PROZAC) 20 MG capsule; Take 1 capsule (20 mg) by mouth daily    Benign essential hypertension  Discussed ease of administration and changed hydralazine to BID.   - hydrALAZINE (APRESOLINE) 50 MG tablet; Take 1 tablet (50 mg) by mouth 2 times daily          BMI  Estimated body mass index is 32.06 kg/m  as calculated from the following:    Height as of this encounter: 1.753 m (5' 9\").    Weight as of this encounter: 98.5 kg (217 lb 1.6 oz).       Depression Screening Follow Up        6/19/2024     8:00 AM   PHQ   PHQ-9 Total Score 21   Q9: Thoughts of better off dead/self-harm past 2 weeks Not at all           Follow Up Actions Taken  Crisis resource information provided in After Visit Summary  Patient declined referral.           Prashanth Ryan is a 44 year old, presenting for the following health issues:  Diabetes        6/19/2024    10:58 AM   Additional Questions   Roomed by Judi Mccrary ()   Accompanied by self         6/19/2024    10:58 AM   Patient Reported Additional Medications   Patient reports taking the following new medications none       Follow-up diabetes    Severe Depression  Patient is having " "palliative chemotherapy for lung cancer.     Seeing palliative care, recently started on ritalin.   Works until 5 pm and has been sleeping throughout the night.   Has lots of anxiety about possible surgery on his lung.     History of Present Illness       Diabetes:   He presents for follow up of diabetes.  He is checking home blood glucose a few times a month.   He checks blood glucose at bedtime.  Blood glucose is sometimes over 200 and never under 70. He is aware of hypoglycemia symptoms including dizziness.   He is concerned about blood sugar frequently over 200.    He is not experiencing numbness or burning in feet, excessive thirst, blurry vision, weight changes or redness, sores or blisters on feet.           He eats 0-1 servings of fruits and vegetables daily.He consumes 3 sweetened beverage(s) daily.He exercises with enough effort to increase his heart rate 60 or more minutes per day.  He exercises with enough effort to increase his heart rate 5 days per week.   He is taking medications regularly.                 Review of Systems  Constitutional, HEENT, cardiovascular, pulmonary, gi and gu systems are negative, except as otherwise noted.      Objective    BP (!) 140/101   Pulse 77   Temp 98.6  F (37  C) (Oral)   Resp 16   Ht 1.753 m (5' 9\")   Wt 98.5 kg (217 lb 1.6 oz)   SpO2 95%   BMI 32.06 kg/m    Body mass index is 32.06 kg/m .  Physical Exam  Constitutional:       Appearance: Normal appearance.   Eyes:      Extraocular Movements: Extraocular movements intact.   Cardiovascular:      Rate and Rhythm: Normal rate and regular rhythm.   Pulmonary:      Effort: Pulmonary effort is normal.      Breath sounds: Normal breath sounds.   Abdominal:      Palpations: Abdomen is soft.   Musculoskeletal:         General: Normal range of motion.      Cervical back: Normal range of motion.   Skin:     General: Skin is warm.   Neurological:      General: No focal deficit present.      Mental Status: He is alert and " oriented to person, place, and time. Mental status is at baseline.   Psychiatric:         Mood and Affect: Mood normal.         Behavior: Behavior normal.         Thought Content: Thought content normal.         Judgment: Judgment normal.                    Signed Electronically by: Jessica Mohr MD

## 2024-06-20 ENCOUNTER — MYC MEDICAL ADVICE (OUTPATIENT)
Dept: ONCOLOGY | Facility: CLINIC | Age: 44
End: 2024-06-20
Payer: MEDICAID

## 2024-06-20 LAB
ALBUMIN SERPL BCG-MCNC: 4.5 G/DL (ref 3.5–5.2)
ALP SERPL-CCNC: 111 U/L (ref 40–150)
ALT SERPL W P-5'-P-CCNC: 18 U/L (ref 0–70)
AMYLASE SERPL-CCNC: 83 U/L (ref 28–100)
ANION GAP SERPL CALCULATED.3IONS-SCNC: 10 MMOL/L (ref 7–15)
AST SERPL W P-5'-P-CCNC: 15 U/L (ref 0–45)
BILIRUB SERPL-MCNC: 0.5 MG/DL
BUN SERPL-MCNC: 20.5 MG/DL (ref 6–20)
CALCIUM SERPL-MCNC: 9.8 MG/DL (ref 8.6–10)
CHLORIDE SERPL-SCNC: 103 MMOL/L (ref 98–107)
CK SERPL-CCNC: 74 U/L (ref 39–308)
CREAT SERPL-MCNC: 0.74 MG/DL (ref 0.67–1.17)
CREAT UR-MCNC: 116 MG/DL
DEPRECATED HCO3 PLAS-SCNC: 27 MMOL/L (ref 22–29)
EGFRCR SERPLBLD CKD-EPI 2021: >90 ML/MIN/1.73M2
GLUCOSE SERPL-MCNC: 346 MG/DL (ref 70–99)
LIPASE SERPL-CCNC: 92 U/L (ref 13–60)
MICROALBUMIN UR-MCNC: 55.9 MG/L
MICROALBUMIN/CREAT UR: 48.19 MG/G CR (ref 0–17)
POTASSIUM SERPL-SCNC: 4.6 MMOL/L (ref 3.4–5.3)
PROT SERPL-MCNC: 6.7 G/DL (ref 6.4–8.3)
SODIUM SERPL-SCNC: 140 MMOL/L (ref 135–145)

## 2024-06-20 NOTE — ORAL ONC MGMT
Oral Chemotherapy Monitoring Program  Lab Follow Up    Reviewed CBC and CMP lab results from 6/19/24.        2/27/2024     9:00 AM 3/22/2024     7:00 AM 4/19/2024     9:00 AM 5/15/2024     8:00 AM 5/17/2024    12:00 PM 6/11/2024     7:00 AM 6/11/2024    11:00 AM   ORAL CHEMOTHERAPY   Assessment Type Refill Refill Refill Refill Other Refill Lab Monitoring   Diagnosis Code Non-Small Cell Lung Cancer Non-Small Cell Lung Cancer Non-Small Cell Lung Cancer Non-Small Cell Lung Cancer Non-Small Cell Lung Cancer Non-Small Cell Lung Cancer Non-Small Cell Lung Cancer   Providers Dr Cori Persaud   [facility]   Is this patient followed by the [facility] team? No No No No No No No   Drug Name Alunbrig (brigatinib) Alunbrig (brigatinib) Alunbrig (brigatinib) Alunbrig (brigatinib) Alunbrig (brigatinib) Alunbrig (brigatinib) Alunbrig (brigatinib)   Dose 120 mg 120 mg 120 mg 120 mg 120 mg 120 mg 120 mg   Current Schedule Daily Daily Daily Daily Daily Daily Daily   Cycle Details Continuous Continuous Continuous Continuous Continuous Continuous Drug on Hold   Other (See Note for Details)       G3 lipase elevation   Pharmacist intervention(other)       Yes   Intervention(s)       Recommend drug hold       Labs:  _  Result Component Current Result Ref Range   Sodium 140 (6/19/2024) 135 - 145 mmol/L     _  Result Component Current Result Ref Range   Potassium 4.6 (6/19/2024) 3.4 - 5.3 mmol/L     _  Result Component Current Result Ref Range   Calcium 9.8 (6/19/2024) 8.6 - 10.0 mg/dL     No results found for Mag within last 30 days.     _  Result Component Current Result Ref Range   Phosphorus 2.3 (L) (6/5/2024) 2.5 - 4.5 mg/dL     _  Result Component Current Result Ref Range   Albumin 4.5 (6/19/2024) 3.5 - 5.2 g/dL     _  Result Component Current Result Ref Range   Urea Nitrogen 20.5 (H) (6/19/2024) 6.0 - 20.0  mg/dL     _  Result Component Current Result Ref Range   Creatinine 0.74 (6/19/2024) 0.67 - 1.17 mg/dL     _  Result Component Current Result Ref Range   AST 15 (6/19/2024) 0 - 45 U/L     _  Result Component Current Result Ref Range   ALT 18 (6/19/2024) 0 - 70 U/L     _  Result Component Current Result Ref Range   Bilirubin Total 0.5 (6/19/2024) <=1.2 mg/dL     _  Result Component Current Result Ref Range   WBC Count 9.5 (6/19/2024) 4.0 - 11.0 10e3/uL     _  Result Component Current Result Ref Range   Hemoglobin 14.9 (6/19/2024) 13.3 - 17.7 g/dL     _  Result Component Current Result Ref Range   Platelet Count 249 (6/19/2024) 150 - 450 10e3/uL     No results found for ANC within last 30 days.     _  Result Component Current Result Ref Range   Absolute Neutrophils 6.1 (6/19/2024) 1.6 - 8.3 10e3/uL        Assessment & Plan:  Results are concerning for increased lipase=92 (grade 1).  This has come down from grade 3 last week.  Patient has been holding Alunbrig since 6/11/24.  Now that lipase is down to grade 1, Connor can restart Alunbrig.       [employer] message sent to Connor.  I tried to call him, but his voicemail box was full.     Follow-Up:  6/26: Dr. Persaud visit + scans    Michelle Castaneda, PharmD  Hematology/Oncology Clinical Pharmacist  [facility]  [phone]

## 2024-06-21 ENCOUNTER — HOSPITAL ENCOUNTER (OUTPATIENT)
Dept: CT IMAGING | Facility: CLINIC | Age: 44
Discharge: HOME | End: 2024-06-21
Attending: STUDENT IN AN ORGANIZED HEALTH CARE EDUCATION/TRAINING PROGRAM | Admitting: STUDENT IN AN ORGANIZED HEALTH CARE EDUCATION/TRAINING PROGRAM
Payer: MEDICAID

## 2024-06-21 DIAGNOSIS — C34.90 NON-SMALL CELL LUNG CANCER, UNSPECIFIED LATERALITY (H): ICD-10-CM

## 2024-06-21 PROCEDURE — 250N000009 HC RX 250: Performed by: STUDENT IN AN ORGANIZED HEALTH CARE EDUCATION/TRAINING PROGRAM

## 2024-06-21 PROCEDURE — 250N000011 HC RX IP 250 OP 636: Performed by: STUDENT IN AN ORGANIZED HEALTH CARE EDUCATION/TRAINING PROGRAM

## 2024-06-21 PROCEDURE — 71260 CT THORAX DX C+: CPT

## 2024-06-21 RX ORDER — IOPAMIDOL 755 MG/ML
500 INJECTION, SOLUTION INTRAVASCULAR ONCE
Status: COMPLETED | OUTPATIENT
Start: 2024-06-21 | End: 2024-06-21

## 2024-06-21 RX ADMIN — SODIUM CHLORIDE 65 ML: 9 INJECTION, SOLUTION INTRAVENOUS at 11:38

## 2024-06-21 RX ADMIN — IOPAMIDOL 100 ML: 755 INJECTION, SOLUTION INTRAVENOUS at 11:38

## 2024-06-22 ENCOUNTER — HEALTH MAINTENANCE LETTER (OUTPATIENT)
Age: 44
End: 2024-06-22

## 2024-06-24 ENCOUNTER — MYC REFILL (OUTPATIENT)
Dept: PALLIATIVE CARE | Facility: CLINIC | Age: 44
End: 2024-06-24
Payer: MEDICAID

## 2024-06-24 DIAGNOSIS — D49.6 BRAIN TUMOR (H): ICD-10-CM

## 2024-06-24 DIAGNOSIS — Z98.890 S/P CRANIOTOMY: ICD-10-CM

## 2024-06-25 RX ORDER — OXYCODONE HYDROCHLORIDE 10 MG/1
10 TABLET ORAL
Qty: 120 TABLET | Refills: 0 | Status: SHIPPED | OUTPATIENT
Start: 2024-06-25 | End: 2024-06-28

## 2024-06-25 NOTE — PROGRESS NOTES
Virtual Visit Details    Type of service:  Video Visit   Video Start Time: 4:07 PM  Video End Time:4:07 PM    Originating Location (pt. Location): Home    Distant Location (provider location):  On-site  Platform used for Video Visit: [facility] ONCOLOGY FOLLOW UP NOTE    PATIENT NAME: Connor Emerson  ENCOUNTER DATE:  June 26, 2024      Care Team  Primary Oncologist: Bassam Persaud MD    REASON FOR CURRENT VISIT: F/u of lung cancer    HISTORY OF PRESENT ILLNESS:  MrBailee Emerson is a 44 year old  male who is a non-smoker with PMHx of T2DM, HTN with metastatic NSCLC comes for follow up     Oncologic Hx:    Diagnosis:   Stage IV NSCLC, Rt lung adenocarcinoma with metastasis to pleura, mediastinum , rt pleural effusion and brain diagnosed 1/2022 (AJCC 8th edition)  PD-L1 TPS 2-3% by [place]   NGS [facility] panel-EML4:ALK rearragement  NGS Guardant- GNAS R201H, KRAS K5E- No ALK    Treatment:   2/23/2022- current: Brigatinib. Dose reduced to 60 mg due to cough after two days of 90 mg daily -->back on 90 mg---> increased to 180 mg  ---> settled on 120 mg    6/27/23- Right stealth craniotomy and resection of tumor:     7/20/23- 11/14/24: Bevacizumab for radiation necrosis. Discontinued due to severe HTN.    Past:  2/15/22- GK to 11 brain lesions  3/2/22- 12/2022 Alectinib 300 mg BID (Dose reduced to 450 mg BID from 600 mg BID due to grade 3 myalgias 3/21/22, again reduced to 300 mg BID 9/28/22)  6/28/22- Craniotomy, resection  9/28/22-10/26- Bevacizumab for radiation necrosis (stopped due to PE)      Intent of treatment: Palliative    Oncologic course:  1/19/22 to 1/22/22-Admitted to [facility] for 2 week progressive SOB secondary to have large rt sided pleural effusion, needing thoracentesis x2 (1.7L and 2.0 L removed), cytology positive for malignancy, adenocarcinoma.   1/26/22- Rt pleural mass biopsy-Dr. Agrawal--POSITIVE FOR ADENOCARCINOMA CONSISTENT WITH LUNG PRIMARY, admixed with mesothelial hyperplasia and inflammatory  infiltrate (+ TTF-1 and CK 7;  negative  p40, calretinin and WT-1. PAX8 immunostain focal +). 4th thoracentesis done simultaneously - 3L approx removed.   2/1/22- PET/CT-Right lower lobe central infiltrative FDG avid 8.2 x 9.6 cm mass representing a primary lung adenocarcinoma. Ipsilateral right perihilar, bilateral pretracheal, subcarinal and superior mediastinal michele metastases. Contralateral mildly FDG avid few lung nodules are suspicious for contralateral metastasis. At least 3 intracranial metastases in the right frontal lobe, left frontal lobe and left cerebellar hemisphere. Nonspecific mild diffuse bone marrow uptake. Further evaluation with a spine MRI could be considered to rule out early marrow infiltration. This could also be seen with red marrow conversion.  2/5/22-  Brain MRI- At least 9 intracranial metastases as detailed above. The dominant lesions involving the orbital right frontal lobe, the posterior left middle frontal gyrus, anterior right temporal lobe and in the left cerebellar hemisphere have surrounding moderate vasogenic type edema.  2/15/22- Saw Dr. Arango from Rad Onc- Rcd GK to 12 lesion in bran  2/16/22- Pleurex placement   3/2/22- Started Alectinib 600 mg BID  3/21/22- Dose reduced to 450 mg BID due to grade 3 myalgias and fatigue  4/2/22 to 4/5/22- Admitted at [facility] for- Severe sepsis due to MSSA infection of right PleurX catheter s/p removal- He presented with onset of pain at tube site starting 4/1; at arrival was tachycardic with leukocytosis (22.7) and elevated lactic acid (2.9).  CT chest showed fluid and stranding tracking outside the pleural space into chest wall along pleural catheter.  IR was consulted and removed catheter 4/2 with report of pustular drainage and tip culture growing MSSA.  Thoracic Surg was consulted who felt no surgical indication necessary given minimal pleural fluid and lack of any signs of abscess.  Initially treated with broad spectrum coverage for  sepsis, narrowed to Ancef once sensitivities returned with plan to transition to cefadroxil for an additional 10 days at discharge per ID. Held drug 4/2 to 4/11 5/2/22- CT CAP- Overall, positive response to therapy with decreased size of right lower lobe and right pleural-based masses, pulmonary metastases, hilar and mediastinal lymphadenopathy. However, a single right posterior pleural-based mass has slightly increased in size since 2/24/2022. No metastatic disease in the abdomen and pelvis. Right Pleurx catheter has been removed. Trace right pleural effusion and right basilar atelectasis.  5/2/22- Brain MRI- The previously demonstrated brain metastases are mildly diminished in size versus to 2/5/2022. The degree of edema is also diminished but not completely resolved. Probable trace amounts of intralesional bleeding demonstrated on the gradient sequence within the metastases. No definite new metastasis or progressive mass effect. No hydrocephalus or infarct.    6/15/22 to 6/17/22- Admitted at [facility]-with aphasia and word finding difficulty over last few weeks.  He presented to [facility] ED on 6/10 for evaluation of his symptoms. MRI brain showed multiple intracranial metastases, with interval enlargement of the dominant lesion within the left frontal lobe and increased surrounding vasogenic edema with 2 mm rightward shift of the septum pellucidum. Due to his worsening anxiety, he left AMA. His symptoms continued to progress to where he could not write at work so he decided to go to the ED for re-evaluation and treatment. Evaluated by JATINDER, Rad Onc (radiaiton necrosis vs tumor progression).  6/16/22- MR Brain (6/16) shows multiple intracranial metastases, with interval enlargement  of the dominant lesion within the left frontal lobe and increased surrounding vasogenic edema with 2 mm rightward shift of the septum pellucidum.  6/16/22- - CT CAP shows slightly decreased size of right lower lobe and right pleural-based  masses. No new pulmonary nodules or lymphadenopathy; No evidence of metastatic disease in the abdomen or pelvis.   6/28/22 to 6/30/22- Admitted at [facility]- Elective left Stealth craniotomy with resection of brain tumor due to ongoing symptoms. No intraoperative complications. EBL 50 ml.  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  7/5/22- Ct CAP- Right lower lobe low-density nodules are not significantly changed. A small left upper lobe pulmonary nodule is also unchanged. Trace pleural fluid on the right has increased slightly. No convincing evidence for metastatic disease in the abdomen or pelvis.  7/18/22 to 7/19/22- Admitted to [facility] for seizure- Reportedly was only taking once Keppra instead of twice daily. Also resumed on dexamethasone 2 mg daily  8/1/22- Brain MRI- Redemonstrated postsurgical changes status post left frontoparietal Craniotomy. Interval increase in size of the dominant ring-enhancing lesion in the left posterior superior frontal lobe with increased moderate surrounding vasogenic edema and local mass effect resulting in narrowing of the supratentorial ventricular system. No significant midline shift/herniation at this time    8/1/22- Dex was increased to 4 mg daily by Dr. Moran    9/1/22- CT Chest- Near resolution of previously seen right pleural nodule. Stable right lower lobe pulmonary nodule    9/28/22- Bevacizumab for radiation necrosis    10/26/22- Bevacizumab     10/26/22- Ct CAP- Stable posterior medial right lower lobe 1.9 x 1.1 cm nodule series 8 image 176. Adjacent stable scarring and atelectasis. The previously noted pleural nodule posteriorly on the right is not currently clearly identified. Stable left upper lobe 0.3 cm nodule image 56    10/26/22- Brain MRI- Overall improved appearance of multiple intracranial metastases with near resolution of associated edema and diminished enhancement and size of multiple residual lesions. No definite new or progressive  metastasis.    11/7/22 to 11/9/22- Admitted for PE and HTN urgency- Small pulmonary embolism in the right lower lobe pulmonary artery. started on Lovenox, Brain MRI neg for PRES.    12/29/22- ED visit- bilateral hip pain, pain in shoulders, knuckles, knees, and ankles- holding alectinib since 12/20/22 1/6/23- Ct CAP- Mild groundglass nodularity in the left upper lobe is new since the previous exam, and may be infectious in etiology. No other significant interval change. Pulmonary nodules are not significantly changed.    1/6/23- Brain MRI- Stable to diminished sequelae of intracranial metastasis and treatment changes. No new or progressive metastasis. No superimposed acute intracranial finding.     2/23/2022- Start Brigatinib. Dose reduced to 60 mg due to cough after two days of 90 mg daily -  3/23/23-Brigatinib  (increase to 90 mg)    4/20/23- CT CAP- Stable- Previously noted mild groundglass nodularity in the left upper lobe has resolved.Pulmonary nodules are unchanged.Trace amount pleural fluid on the right has decreased slightly.    4/20/23- Brain MRI- Possile progression- Largest metastasis within the right frontal lobe has increased in size with worsening peripheral nodular enhancement and worsening vasogenic edema contributing to new right to left midline shift.  Multiple new/enlarging metastases scattered throughout the cerebral hemispheres and cerebellum. Started on dexamethasone by NGS on 4/28/23 5/17/23- presents to clinic with glucose 627, admission to hospital for stability on insulin drip    6/16/23- Brain MRI- Interval increase in size of the necrotic lesion in the anterior aspect of the right frontal lobe from 2.4 x 2.3 x 2.4 cm previously to 3.0 x 3.5 x 2.8 cm on the current study. Mass effect due to slightly increased vasogenic edema surrounding the right frontal lesion resulted in increase of leftward midline shift of the anterior interhemispheric fissure from 0.7 cm previously to 0.9 cm  currently. Interval increase in size in two adjacent metastases at the frontoparietal junction on the left. The remaining scattered intra-axial enhancing nodules are not changed appreciably in size or appearance since the comparison study.No evidence for acute intracranial pathology.   Dr. Moran- Due to worsening headaches and more problems with walking. Recommended a right Stealth craniotomy for resection of the lesion.     6/27/23- Right stealth craniotomy and resection of tumor: Final path: radiation necrosis    7/10/23- Ct CAP- stable Stable exam without evidence for new disease.Stable pulmonary nodules including a dominant nodular opacity at the right lower lobe.    7/20/23- Bevacizumab for radiation necrosis    8/16/23- Bevacizumab     9/12/23- Ct CAP-  Stable right lower lobe dominant nodular opacity. No new disease in the chest, abdomen and pelvis.     9/15, 10/6, 10/27, 11/17-  Bevacizumab    10/25/23- MRI Brain- 1. Redemonstrated postoperative changes of right frontal craniotomy. Decreased size of the right frontal resection cavity with significant decrease in the surrounding right frontal lobe vasogenic edema seen on the previous study. Mass effect has essentially resolved in the interim and there is no longer any midline shift. In the interim, slightly increased thickness of a rind of peripheral nodular enhancement along the posterior inferior and medial aspects of the right frontal lobe resection cavity, indeterminate. There is no significant elevated cerebral blood volume in this area. The findings may represent evolving posttreatment changes; however, residual tumor is not excluded.  Stable postoperative changes status post left anterior parietal craniotomy with irregular enhancement in the left frontal lobe parenchyma underlying the resection cavity, likely due to posttreatment change. Other scattered supratentorial and infratentorial metastatic lesions are overall similar to slightly decreased in  size, as described.    12/5/23- PET/CT- with sole site of disease in the RLL measuring 1.6 x 1.4 cm and with SUV max of 4.2. No other sites of disease. His case was reviewed at tumor board and he met with Dr. Fu 12/14/24 with recommendations against surgical resection due to risk of significant pleural scarring. Continued on brigatinib 120 mg daily.     1/29/24 Brain MRI: No new metastatic lesions. No significant change in supratentorial and infratentorial subcentimeter metastatic enhancing lesions. Stable right inferior frontal and left high frontal postsurgical changes.     12/22-current: lost to follow up due to insurance issues    3/15/24- CT chest- Stable nodule medial right lower lobe. No metastatic disease demonstrated.    6/21/24- CT CAP- stable      Interval Hx:  Feeling well overall  He feels sluggish,   Notes he falls asleep around 5:30pm in the evenings and sleeps through the night; states he does no feel he gets adequate rest despite getting significant amounts of sleep.  he started ritalin but has not made much difference  Working 8 hrs wears him out, makes him super tired  The right sided pain is now controlled, taking methadone and oxycodone prn, takes at least oxy 6/day  Taking brigatinib as instructed, has missed for 7 days when his lipase came back high  No new headaches, notes some new vision changes (things are not clear), no filed cuts  Appetite is suppressed, eats only 1/day, weight si stable  Increased cravings for sweets; states he will go periods without eating due to decreased appetite and then craves sugary foods. His wife has tried keeping sugary foods out of the house.  Does not frequently check blood sugars at home  Would like to start drinking protein shakes with meals daily    Has increased anxiety about his treatment plan; states he is nervous he may need surgery. Has already requested 4 months off of work for possible surgery. States he would prefer to undergo radiation  therapy if recommended.    Recently spent time in [place] with his father which he enjoyed    ECOG PS 0-1    REVIEW OF SYSTEMS: 14 point ROS negative other than the symptoms noted above in the HPI.    Wt Readings from Last 4 Encounters:   06/19/24 98.5 kg (217 lb 1.6 oz)   06/06/24 97.1 kg (214 lb)   06/05/24 98.3 kg (216 lb 11.2 oz)   03/21/24 98.4 kg (217 lb)      Review of Systems:  A comprehensive ROS was performed and found to be negative or non-contributory with the exception of that noted in the HPI above.    Past Medical History:  GERD  Hypertension, not on medication  Type 2 diabetes mellitus, not on medications currently, previously on Metformin    Past Surgical History:  Past Surgical History:   Procedure Laterality Date    BRONCHOSCOPY RIGID OR FLEXIBLE W/TRANSENDOSCOPIC ENDOBRONCHIAL ULTRASOUND GUIDED Bilateral 1/26/2022    Procedure: Right BRONCHOSCOPY, FIBEROPTIC, endobronchial ultrasound, pleural biopsy;  Surgeon: Dallin Agrawal MD;  Location: [facility] OR    INJECT BLOCK MEDIAL BRANCH CERVICAL/THORACIC/LUMBAR      INSERT CHEST TUBE Right 2/16/2022    Procedure: INSERTION, CATHETER, INTERCOSTAL, FOR DRAINAGE;  Surgeon: Dallin Agrawal MD;  Location: [facility] GI    INSERT CHEST TUBE Right 3/9/2022    Procedure: INSERTION, CATHETER, INTERCOSTAL, FOR DRAINAGE;  Surgeon: Sushila Antonio MD;  Location: [facility] GI    IR CHEST TUBE REMOVAL TUNNELED RIGHT  4/2/2022    OPTICAL TRACKING SYSTEM CRANIOTOMY, EXCISE TUMOR, COMBINED Left 6/28/2022    Procedure: Left stealth craniotomy for tumor resection with motor mapping;  Surgeon: Stephen Moran MD;  Location:  OR    OPTICAL TRACKING SYSTEM CRANIOTOMY, EXCISE TUMOR, COMBINED Right 6/27/2023    Procedure: Right stealth craniotomy and resection of tumor;  Surgeon: Stephen Moran MD;  Location:  OR    ORTHOPEDIC SURGERY      Ganesh. Rotator cuff repair.    PLEUROSCOPY N/A 1/26/2022    Procedure: Pleuroscopy with Pleural Biopsy;  Surgeon: Dallin Agrawal MD;   Location: [facility] OR       Social History:  Lives with wife and 4 kids in [place]. Works as a  for an apartment complex in [place]. Exposure to household chemicals and . No significant exposure to asbestos. No signal exposure to benzene or similar chemicals. No significant smoking history-states that he smoked 1 to 2 cigarettes occasionally per month for about 2 years in college, non-smoking since then. No significant alcohol use history. No other recreational substances. Good support system. Kids are 23, 19, 17 and 13.    Family History  Significant history for cancers on maternal side. Mother  of uterine cancer. 2 maternal uncles have possible metastatic melanoma.    Outpatient Medications:  Current Outpatient Medications   Medication Sig Dispense Refill    acetaminophen (TYLENOL) 325 MG tablet Take 325-650 mg by mouth every 6 hours as needed for mild pain      albuterol (PROAIR HFA/PROVENTIL HFA/VENTOLIN HFA) 108 (90 Base) MCG/ACT inhaler Inhale 2 puffs into the lungs every 6 hours as needed for shortness of breath, wheezing or cough 18 g 0    Alcohol Swabs PADS Use to swab the area of the injection or jabier as directed. Per insurance coverage 100 each 0    baclofen (LIORESAL) 10 MG tablet Take 0.5-1 tablets (5-10 mg) by mouth 3 times daily as needed for other (hiccups) 60 tablet 4    blood glucose (NO BRAND SPECIFIED) lancets standard To use to test glucose level in the blood Use to test blood sugar  4  times daily as directed. To accompany glucose monitor brands per insurance coverage. 100 each 3    blood glucose (NO BRAND SPECIFIED) test strip To use to test glucose level in the blood Use to test blood sugar  4 times daily as directed. To accompany glucose monitor brands per insurance coverage. 100 strip 3    Blood Glucose Monitoring Suppl (ACCU-CHEK GUIDE) w/Device KIT       brigatinib (ALUNBRIG) 30 MG TABS tablet Take 4 tablets (120 mg) by mouth daily May be taken with or  without food. Swallow whole. Do not crush or chew tablets. 120 tablet 2    DULoxetine (CYMBALTA) 30 MG capsule Take 1 capsule (30 mg) by mouth 2 times daily 60 capsule 2    FLUoxetine (PROZAC) 20 MG capsule Take 1 capsule (20 mg) by mouth daily 30 capsule 1    hydrALAZINE (APRESOLINE) 50 MG tablet Take 1 tablet (50 mg) by mouth 2 times daily 180 tablet 3    hydrochlorothiazide (HYDRODIURIL) 50 MG tablet Take 1 tablet (50 mg) by mouth daily 30 tablet 1    insulin aspart (NOVOLOG PEN) 100 UNIT/ML pen Inject 0-40 Units Subcutaneous 3 times daily (before meals) Per discharge instructions sliding scale 45 mL 2    insulin glargine (LANTUS PEN) 100 UNIT/ML pen Inject 70 Units Subcutaneous every morning 15 mL 4    insulin pen needle (32G X 4 MM) 32G X 4 MM miscellaneous Use as directed by provider. Per insurance coverage 100 each 0    levETIRAcetam (KEPPRA) 1000 MG tablet Take 1 tablet (1,000 mg) by mouth 2 times daily 60 tablet 11    lisinopril (ZESTRIL) 40 MG tablet Take 1 tablet (40 mg) by mouth daily 90 tablet 1    methadone (DOLOPHINE) 5 MG tablet Take 1.5 tablets (7.5 mg) by mouth 2 times daily 90 tablet 0    methylphenidate (RITALIN) 5 MG tablet Take 1-2 tablets (5-10 mg) by mouth 2 times daily as needed (fatigue) Take second dose by 12 noon, if it is needed 90 tablet 0    naloxone (NARCAN) 4 MG/0.1ML nasal spray Spray 1 spray (4 mg) into one nostril alternating nostrils as needed for opioid reversal every 2-3 minutes until assistance arrives 0.2 mL 3    oxyCODONE IR (ROXICODONE) 10 MG tablet Take 1 tablet (10 mg) by mouth every 3 hours as needed for moderate pain 120 tablet 0    propranolol (INDERAL) 20 MG tablet Take 2 tablets (40 mg) by mouth 3 times daily 180 tablet 1    rivaroxaban ANTICOAGULANT (XARELTO) 20 MG TABS tablet Take 1 tablet (20 mg) by mouth daily (with dinner) 90 tablet 3     No current facility-administered medications for this visit.     There were no vitals taken for this visit.  General: No  acute distress, no rashes on skin. Tenderness to right upper flank with deep palpation. No erythema or obvious masses visualized. Tissue density palpable, but is somewhat mobile and soft.  HEENT: Sclera anicteric. Oral mucosa pink and moist.  No mucositis or thrush  Heart: Regular, rate, and rhythm  Lungs: Clear to ascultation bilaterally. Breathing comfortably  Abdomen: Positive bowel sounds. Soft, non-distended, non-tender.   Extremities: no lower extremity edema  Neuro: Cranial nerves grossly intact      Labs & Studies: I personally reviewed the following studies:  Most Recent 3 CBC's:  Recent Labs   Lab Test 06/19/24  1056 03/20/24  1632 12/28/23  0755   WBC 9.5 11.8* 11.5*   HGB 14.9 15.0 16.3   MCV 90 90 91    252 214     Most Recent 3 BMP's:  Recent Labs   Lab Test 06/19/24  1056 06/05/24  1347 03/20/24  1632    140 142   POTASSIUM 4.6 4.0 3.9   CHLORIDE 103 104 103   CO2 27 25 27   BUN 20.5* 14.8 18.5   CR 0.74 0.67 0.71   ANIONGAP 10 11 12   TORY 9.8 9.2 9.3   * 292*  292* 175*    Most Recent 2 LFT's:  Recent Labs   Lab Test 06/19/24  1056 06/05/24  1347   AST 15 18   ALT 18 16   ALKPHOS 111 107   BILITOTAL 0.5 0.7    Most Recent TSH and T4:  Recent Labs   Lab Test 09/12/22  1642   TSH 2.56        ASSESSMENT AND PLAN:  Stage IV NSCLC, Rt lung adenocarcinoma with metastasis to pleura, mediastinum , rt pleural effusion and brain diagnosed 1/2022 (AJCC 8th edition)  PD-L1 TPS 2-3% by [place]   NGS [facility] panel-EML4:ALK rearragement; chr2:[id number], chr2:[id number]  NGS [facility]- GNAS R201H, KRAS K5E- No ALK    He began Alectinib 600 mg BID 3/2/22 and unfortunately developed grade 3 myalgias which have improved with lowering the dose 450 mg BID. He was holding drug 4/2/22 to 4/11/22 due to MSSA infection from pleurex which is removed. Resumed at 450 mg BID. Due to ongoing myalgias (Although CK is normal), we dose reduced to 300 mg BID.   In Dec 2022, developed Gr 3 arthralgia, and we stopped  drug 12/20/22. Had unremitting arthalgias, eventully had to starte PO steroids which led to improvement and resolved. Radiographicaly, he has had a near CR to Rx in the lung, has a residual rt LL lesion.     Began brigatinib 2/22/23 (delayed due to pt hesitancy). Developed severe cough on day 2 so brigatinib was held and cough resolved with in 24-48 hours. He restarted 60 mg daily and upped it to 90 mg.Restging CT showing stable disease in the lung, however, MRI with several contrast enhancement and increase in size of previously treated lesions. His dose was increased to 180 mg daily to address possible CNS disease progression and started on dexamethasone. 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 Restaging CT in 9/2023 showing overall disease stablity with no evidence of active cancer. 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  PET/CT after these three months showed oligoperisstent disease with a single focus of active malignancy in the RLL. Met with Thoracic surgery 12/2023 to discuss resection but they recommended against it due to risk for scarring after. 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 MOst recent CT showing stable disease but brain MRI showing several possible new lesions and enlargement of exissting lesion (see below). Will review Brain MRI with rad onc and NSGY. If concern for true progression, will likely switch to lorlatinib.    Plan  - Continue brigatinib at 120 mg for now  - RTC with me in July 8        # Brain mets:   # Radiation necrosis,   -Baseline Brain MRI with several brain mets, s/p GK to 11-12 brain mets. F/u Brain MRI in June 2022 was showing enlargement of the one of the lesions along with edema, therefore had to undergo craniotomy followed  by resection, the final biopsy consistent with radiation necrosis.   -received two doses bevacizumab for radiation necrosis in Fall 2022, tolerated poorly with HTN urgency and PE.)   -MRI in 4/2023 now showing contrast enhancement of lesions and a dominate lesion in the R frontal region with edema, several other smaller lesion. Short term MRI showing increase in size of the rt frontal lesion, underwent resection, patho again showing radiation necrosis. Restarted bevacizumab, which resulted in improved radiation necrosis / vasogenic edema on imaging in 10/2023 but ultimately was stopped due to uncontrolled HTN, last dose being 11/2023  -MRI imaging 1/2024 with stable disease and no edema.  -Brain MRI 3/2024 cancelled due to lapse in medical insurance.  -Most recent MRI 6/2024 showing showing several possible new lesions and enlargement of exissting lesion, however I am unsure if this is due to better imaging (perfusion MRI this time).I am unsure if these are truly new lesions or we are seeing them because of better imaging techniques and are these lesions in the same spot as previously treated ones. I will review this with Dr. Osborn and Dr. Arango  I will likley change his sytemic Rx if this is true progression.     #Right pleuritic/chest pain  Felt to be 2/2 prior pleurex drain. No acute resp changes today. Pain now improving on increased dose of methadone and prn oxy  -On methadone and oxycodone; followed by Palliative Care a     #Elevated Lipase  -mild elevation. No concern on exam    #Diabetes, Type 2  Hyperglycemia on labs.On insulin but not very adherent to diet. Discussed dietary recommendations as it seems he has been eating a lot of sugary foods lately.   Etablsihed with PCP 6/19 who is now managing     #PE: provoked by malignancy vs bevacizumab in 11/2022  -- on rivaroxiabn 20 mg daily    #Grade 2-3 arthralgias    All joints affected, no morning stiffness, normal ESR and CRP  -better on brigatinib vs  alectinib.  -stable at this time with pain mgmt    #HTN: elevated today but much improved since HTN urgency levels while he was on Fe.   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 Due to significant time spent with medication management, will monitor at this time. Recheck 6/12 as scheduled      On the day of service  Chart review: 5 minutes  Visit duration: 30 minutes  Care coordination: 5 minutes    Bassam Persaud MD    Hematology, Oncology and Transplantation

## 2024-06-26 ENCOUNTER — ANCILLARY PROCEDURE (OUTPATIENT)
Dept: MRI IMAGING | Facility: CLINIC | Age: 44
End: 2024-06-26
Attending: NURSE PRACTITIONER
Payer: MEDICAID

## 2024-06-26 ENCOUNTER — ONCOLOGY VISIT (OUTPATIENT)
Dept: SURGERY | Facility: CLINIC | Age: 44
End: 2024-06-26
Attending: THORACIC SURGERY (CARDIOTHORACIC VASCULAR SURGERY)
Payer: MEDICAID

## 2024-06-26 ENCOUNTER — VIRTUAL VISIT (OUTPATIENT)
Dept: ONCOLOGY | Facility: CLINIC | Age: 44
End: 2024-06-26
Attending: STUDENT IN AN ORGANIZED HEALTH CARE EDUCATION/TRAINING PROGRAM
Payer: MEDICAID

## 2024-06-26 VITALS
SYSTOLIC BLOOD PRESSURE: 147 MMHG | WEIGHT: 218.7 LBS | DIASTOLIC BLOOD PRESSURE: 104 MMHG | HEART RATE: 71 BPM | TEMPERATURE: 98.5 F | BODY MASS INDEX: 32.3 KG/M2 | RESPIRATION RATE: 16 BRPM | OXYGEN SATURATION: 96 %

## 2024-06-26 DIAGNOSIS — C79.31 MALIGNANT NEOPLASM METASTATIC TO BRAIN (H): ICD-10-CM

## 2024-06-26 DIAGNOSIS — C34.90 NON-SMALL CELL LUNG CANCER, UNSPECIFIED LATERALITY (H): ICD-10-CM

## 2024-06-26 DIAGNOSIS — R91.1 LUNG NODULE: Primary | ICD-10-CM

## 2024-06-26 DIAGNOSIS — C34.31 MALIGNANT NEOPLASM OF LOWER LOBE OF RIGHT LUNG (H): ICD-10-CM

## 2024-06-26 PROCEDURE — 99215 OFFICE O/P EST HI 40 MIN: CPT | Mod: 95 | Performed by: STUDENT IN AN ORGANIZED HEALTH CARE EDUCATION/TRAINING PROGRAM

## 2024-06-26 PROCEDURE — G0463 HOSPITAL OUTPT CLINIC VISIT: HCPCS | Performed by: THORACIC SURGERY (CARDIOTHORACIC VASCULAR SURGERY)

## 2024-06-26 PROCEDURE — 70553 MRI BRAIN STEM W/O & W/DYE: CPT | Mod: GC | Performed by: RADIOLOGY

## 2024-06-26 PROCEDURE — A9585 GADOBUTROL INJECTION: HCPCS | Mod: JZ | Performed by: RADIOLOGY

## 2024-06-26 PROCEDURE — 99215 OFFICE O/P EST HI 40 MIN: CPT | Performed by: THORACIC SURGERY (CARDIOTHORACIC VASCULAR SURGERY)

## 2024-06-26 RX ORDER — GADOBUTROL 604.72 MG/ML
10 INJECTION INTRAVENOUS ONCE
Status: COMPLETED | OUTPATIENT
Start: 2024-06-26 | End: 2024-06-26

## 2024-06-26 RX ADMIN — GADOBUTROL 10 ML: 604.72 INJECTION INTRAVENOUS at 09:46

## 2024-06-26 ASSESSMENT — PAIN SCALES - GENERAL: PAINLEVEL: MODERATE PAIN (5)

## 2024-06-26 NOTE — LETTER
6/26/2024      Connor Emerson  [address]  [facility] [address]      Dear Colleague,    Thank you for referring your patient, Connor Emerson, to the [facility]. Please see a copy of my visit note below.    THORACIC SURGERY - ESTABLISHED PATIENT OFFICE VISIT      I saw Connor Emerson in follow-up for the evaluation and treatment of a nodule of the RIGHT lower lobe in the setting of stage IV NSCLC (EML4:ALK re-arragement, on brigatinib)    HPI  Mr. 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 He also had extensive mediastinal lymphadenopathy and contralateral lung nodules suspicious for metastases. 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 He has been on targeted therapy since diagnosis with excellent results. Now he has a stable, persistent RLL nodule that is PET avid. No other evidence of disease.          ECOG performance status  1- Mild physical restriction, sedentary                 Previsit Tests   PET-CT 2/2022:    PFT (): pending  Pathology (1/2023): RIGHT pleural mass = moderately differentiated adenocarcinoma, EML4:ALK re-arragement; pleural fluid cytology positive  CT scan (6/21/2024): RIGHT lower lobe STABLE 1.4cm pulmonary Nodule, without mediastinal adenopathy, with no pleural effusion     PET scan (12/5/2023): FDG avid 1.6cm RIGHT lower lobe Nodule (Max SUV 4.5). No suspicious hypermetabolic activity elsewhere  Brain MRI (6/26/2024): No evidence of metastatic disease      Hgb A1c (6/21/2024): 10.6    PMH  Reviewed, as below    Past Medical History:   Diagnosis Date     Atypical chest pain 12/02/2013     Cancer (H)      Complication of anesthesia      Diabetes (H)      GERD (gastroesophageal  reflux disease) 12/02/2013     History of pulmonary embolism      HTN (hypertension) 05/14/2012     HTN, goal below 140/90 07/02/2013     Insomnia 02/21/2012     Mediastinal lymphadenopathy      Migraine headache 07/02/2013     Migraine with aura, without mention of intractable migraine without mention of status migrainosus      Pneumonia         PSH  Reviewed, as below    Past Surgical History:   Procedure Laterality Date     BRONCHOSCOPY RIGID OR FLEXIBLE W/TRANSENDOSCOPIC ENDOBRONCHIAL ULTRASOUND GUIDED Bilateral 1/26/2022    Procedure: Right BRONCHOSCOPY, FIBEROPTIC, endobronchial ultrasound, pleural biopsy;  Surgeon: Dallin Agrawal MD;  Location: [facility] OR     INJECT BLOCK MEDIAL BRANCH CERVICAL/THORACIC/LUMBAR       INSERT CHEST TUBE Right 2/16/2022    Procedure: INSERTION, CATHETER, INTERCOSTAL, FOR DRAINAGE;  Surgeon: Dallin Agrawal MD;  Location: [facility] GI     INSERT CHEST TUBE Right 3/9/2022    Procedure: INSERTION, CATHETER, INTERCOSTAL, FOR DRAINAGE;  Surgeon: Sushila Antonio MD;  Location: [facility]     IR CHEST TUBE REMOVAL TUNNELED RIGHT  4/2/2022     OPTICAL TRACKING SYSTEM CRANIOTOMY, EXCISE TUMOR, COMBINED Left 6/28/2022    Procedure: Left stealth craniotomy for tumor resection with motor mapping;  Surgeon: Stephen Moran MD;  Location:  OR     OPTICAL TRACKING SYSTEM CRANIOTOMY, EXCISE TUMOR, COMBINED Right 6/27/2023    Procedure: Right stealth craniotomy and resection of tumor;  Surgeon: Stephen Moran MD;  Location:  OR     ORTHOPEDIC SURGERY      Ganesh. Rotator cuff repair.     PLEUROSCOPY N/A 1/26/2022    Procedure: Pleuroscopy with Pleural Biopsy;  Surgeon: Dallin Agrawal MD;  Location: [facility] OR        Allergies   Allergen Reactions     Vicodin [Hydrocodone-Acetaminophen] Nausea and Vomiting and GI Disturbance       Current Outpatient Medications   Medication Sig Dispense Refill     acetaminophen (TYLENOL) 325 MG tablet Take 325-650 mg by mouth every 6 hours as needed for mild pain        albuterol (PROAIR HFA/PROVENTIL HFA/VENTOLIN HFA) 108 (90 Base) MCG/ACT inhaler Inhale 2 puffs into the lungs every 6 hours as needed for shortness of breath, wheezing or cough 18 g 0     Alcohol Swabs PADS Use to swab the area of the injection or jabier as directed. Per insurance coverage 100 each 0     baclofen (LIORESAL) 10 MG tablet Take 0.5-1 tablets (5-10 mg) by mouth 3 times daily as needed for other (hiccups) 60 tablet 4     blood glucose (NO BRAND SPECIFIED) lancets standard To use to test glucose level in the blood Use to test blood sugar  4  times daily as directed. To accompany glucose monitor brands per insurance coverage. 100 each 3     blood glucose (NO BRAND SPECIFIED) test strip To use to test glucose level in the blood Use to test blood sugar  4 times daily as directed. To accompany glucose monitor brands per insurance coverage. 100 strip 3     Blood Glucose Monitoring Suppl (ACCU-CHEK GUIDE) w/Device KIT        brigatinib (ALUNBRIG) 30 MG TABS tablet Take 4 tablets (120 mg) by mouth daily May be taken with or without food. Swallow whole. Do not crush or chew tablets. 120 tablet 2     DULoxetine (CYMBALTA) 30 MG capsule Take 1 capsule (30 mg) by mouth 2 times daily 60 capsule 2     FLUoxetine (PROZAC) 20 MG capsule Take 1 capsule (20 mg) by mouth daily 30 capsule 1     hydrALAZINE (APRESOLINE) 50 MG tablet Take 1 tablet (50 mg) by mouth 2 times daily 180 tablet 3     hydrochlorothiazide (HYDRODIURIL) 50 MG tablet Take 1 tablet (50 mg) by mouth daily 30 tablet 1     insulin aspart (NOVOLOG PEN) 100 UNIT/ML pen Inject 0-40 Units Subcutaneous 3 times daily (before meals) Per discharge instructions sliding scale 45 mL 2     insulin glargine (LANTUS PEN) 100 UNIT/ML pen Inject 70 Units Subcutaneous every morning 15 mL 4     insulin pen needle (32G X 4 MM) 32G X 4 MM miscellaneous Use as directed by provider. Per insurance coverage 100 each 0     levETIRAcetam (KEPPRA) 1000 MG tablet Take 1 tablet (1,000  mg) by mouth 2 times daily 60 tablet 11     lisinopril (ZESTRIL) 40 MG tablet Take 1 tablet (40 mg) by mouth daily 90 tablet 1     methadone (DOLOPHINE) 5 MG tablet Take 1.5 tablets (7.5 mg) by mouth 2 times daily 90 tablet 0     methylphenidate (RITALIN) 5 MG tablet Take 1-2 tablets (5-10 mg) by mouth 2 times daily as needed (fatigue) Take second dose by 12 noon, if it is needed 90 tablet 0     naloxone (NARCAN) 4 MG/0.1ML nasal spray Spray 1 spray (4 mg) into one nostril alternating nostrils as needed for opioid reversal every 2-3 minutes until assistance arrives 0.2 mL 3     oxyCODONE IR (ROXICODONE) 10 MG tablet Take 1 tablet (10 mg) by mouth every 3 hours as needed for moderate pain 120 tablet 0     propranolol (INDERAL) 20 MG tablet Take 2 tablets (40 mg) by mouth 3 times daily 180 tablet 1     rivaroxaban ANTICOAGULANT (XARELTO) 20 MG TABS tablet Take 1 tablet (20 mg) by mouth daily (with dinner) 90 tablet 3     No current facility-administered medications for this visit.           Physical examination  BP (!) 147/104 (BP Location: Right arm, Patient Position: Sitting, Cuff Size: Adult Regular)   Pulse 71   Temp 98.5  F (36.9  C) (Oral)   Resp 16   Wt 99.2 kg (218 lb 11.2 oz)   SpO2 96%   BMI 32.30 kg/m          From a personal perspective, he is here with his wife, Kylie.      IMPRESSION   44 year old male patient with stage IV NSCLC and a possible RLL recurrence  Poorly controlled DM    Stage: Clinical stage IV    PLAN  I spent 40 min on the date of the encounter in chart review, patient visit, review of tests, documentation and/or discussion with other providers about the issues documented above. I reviewed the plan as follows:  1) Improve DM control:  I communicated with Dr. Mohr.   2) Lung nodule: At this time it is not clear if this is a recurrence or not. First and foremost, his DM must be under control. Although it would seem desirable to resect a recurrence, it will be a technically very  challenging procedure, and most likely not worth the risk for a non-curative surgery. I strongly favor stereotactic radiation.    I appreciate the opportunity to participate in the care of your patient and will keep you updated.  Sincerely,    Delroy Fu MD            Again, thank you for allowing me to participate in the care of your patient.        Sincerely,        Delroy Fu MD

## 2024-06-26 NOTE — PROGRESS NOTES
THORACIC SURGERY - ESTABLISHED PATIENT OFFICE VISIT      I saw Connor Emerson in follow-up for the evaluation and treatment of a nodule of the RIGHT lower lobe in the setting of stage IV NSCLC (EML4:ALK re-arragement, on brigatinib)    HPI  Mr. 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 He also had extensive mediastinal lymphadenopathy and contralateral lung nodules suspicious for metastases. 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 He has been on targeted therapy since diagnosis with excellent results. Now he has a stable, persistent RLL nodule that is PET avid. No other evidence of disease.          ECOG performance status  1- Mild physical restriction, sedentary                 Previsit Tests   PET-CT 2/2022:    PFT (): pending  Pathology (1/2023): RIGHT pleural mass = moderately differentiated adenocarcinoma, EML4:ALK re-arragement; pleural fluid cytology positive  CT scan (6/21/2024): RIGHT lower lobe STABLE 1.4cm pulmonary Nodule, without mediastinal adenopathy, with no pleural effusion     PET scan (12/5/2023): FDG avid 1.6cm RIGHT lower lobe Nodule (Max SUV 4.5). No suspicious hypermetabolic activity elsewhere  Brain MRI (6/26/2024): No evidence of metastatic disease      Hgb A1c (6/21/2024): 10.6    PMH  Reviewed, as below    Past Medical History:   Diagnosis Date    Atypical chest pain 12/02/2013    Cancer (H)     Complication of anesthesia     Diabetes (H)     GERD (gastroesophageal reflux disease) 12/02/2013    History of pulmonary embolism     HTN (hypertension) 05/14/2012    HTN, goal below 140/90 07/02/2013    Insomnia 02/21/2012    Mediastinal lymphadenopathy     Migraine headache 07/02/2013    Migraine with aura, without  mention of intractable migraine without mention of status migrainosus     Pneumonia         PSH  Reviewed, as below    Past Surgical History:   Procedure Laterality Date    BRONCHOSCOPY RIGID OR FLEXIBLE W/TRANSENDOSCOPIC ENDOBRONCHIAL ULTRASOUND GUIDED Bilateral 1/26/2022    Procedure: Right BRONCHOSCOPY, FIBEROPTIC, endobronchial ultrasound, pleural biopsy;  Surgeon: Dallin Agrawal MD;  Location: [facility] OR    INJECT BLOCK MEDIAL BRANCH CERVICAL/THORACIC/LUMBAR      INSERT CHEST TUBE Right 2/16/2022    Procedure: INSERTION, CATHETER, INTERCOSTAL, FOR DRAINAGE;  Surgeon: Dallin Agrawal MD;  Location: [facility]    INSERT CHEST TUBE Right 3/9/2022    Procedure: INSERTION, CATHETER, INTERCOSTAL, FOR DRAINAGE;  Surgeon: Sushila Antonio MD;  Location: [facility]    IR CHEST TUBE REMOVAL TUNNELED RIGHT  4/2/2022    OPTICAL TRACKING SYSTEM CRANIOTOMY, EXCISE TUMOR, COMBINED Left 6/28/2022    Procedure: Left stealth craniotomy for tumor resection with motor mapping;  Surgeon: Stephen Moran MD;  Location:  OR    OPTICAL TRACKING SYSTEM CRANIOTOMY, EXCISE TUMOR, COMBINED Right 6/27/2023    Procedure: Right stealth craniotomy and resection of tumor;  Surgeon: Stephen Moran MD;  Location:  OR    ORTHOPEDIC SURGERY      Ganesh. Rotator cuff repair.    PLEUROSCOPY N/A 1/26/2022    Procedure: Pleuroscopy with Pleural Biopsy;  Surgeon: Dallin Agrawal MD;  Location: [facility] OR        Allergies   Allergen Reactions    Vicodin [Hydrocodone-Acetaminophen] Nausea and Vomiting and GI Disturbance       Current Outpatient Medications   Medication Sig Dispense Refill    acetaminophen (TYLENOL) 325 MG tablet Take 325-650 mg by mouth every 6 hours as needed for mild pain      albuterol (PROAIR HFA/PROVENTIL HFA/VENTOLIN HFA) 108 (90 Base) MCG/ACT inhaler Inhale 2 puffs into the lungs every 6 hours as needed for shortness of breath, wheezing or cough 18 g 0    Alcohol Swabs PADS Use to swab the area of the injection or jabier as directed.  Per insurance coverage 100 each 0    baclofen (LIORESAL) 10 MG tablet Take 0.5-1 tablets (5-10 mg) by mouth 3 times daily as needed for other (hiccups) 60 tablet 4    blood glucose (NO BRAND SPECIFIED) lancets standard To use to test glucose level in the blood Use to test blood sugar  4  times daily as directed. To accompany glucose monitor brands per insurance coverage. 100 each 3    blood glucose (NO BRAND SPECIFIED) test strip To use to test glucose level in the blood Use to test blood sugar  4 times daily as directed. To accompany glucose monitor brands per insurance coverage. 100 strip 3    Blood Glucose Monitoring Suppl (ACCU-CHEK GUIDE) w/Device KIT       brigatinib (ALUNBRIG) 30 MG TABS tablet Take 4 tablets (120 mg) by mouth daily May be taken with or without food. Swallow whole. Do not crush or chew tablets. 120 tablet 2    DULoxetine (CYMBALTA) 30 MG capsule Take 1 capsule (30 mg) by mouth 2 times daily 60 capsule 2    FLUoxetine (PROZAC) 20 MG capsule Take 1 capsule (20 mg) by mouth daily 30 capsule 1    hydrALAZINE (APRESOLINE) 50 MG tablet Take 1 tablet (50 mg) by mouth 2 times daily 180 tablet 3    hydrochlorothiazide (HYDRODIURIL) 50 MG tablet Take 1 tablet (50 mg) by mouth daily 30 tablet 1    insulin aspart (NOVOLOG PEN) 100 UNIT/ML pen Inject 0-40 Units Subcutaneous 3 times daily (before meals) Per discharge instructions sliding scale 45 mL 2    insulin glargine (LANTUS PEN) 100 UNIT/ML pen Inject 70 Units Subcutaneous every morning 15 mL 4    insulin pen needle (32G X 4 MM) 32G X 4 MM miscellaneous Use as directed by provider. Per insurance coverage 100 each 0    levETIRAcetam (KEPPRA) 1000 MG tablet Take 1 tablet (1,000 mg) by mouth 2 times daily 60 tablet 11    lisinopril (ZESTRIL) 40 MG tablet Take 1 tablet (40 mg) by mouth daily 90 tablet 1    methadone (DOLOPHINE) 5 MG tablet Take 1.5 tablets (7.5 mg) by mouth 2 times daily 90 tablet 0    methylphenidate (RITALIN) 5 MG tablet Take 1-2  tablets (5-10 mg) by mouth 2 times daily as needed (fatigue) Take second dose by 12 noon, if it is needed 90 tablet 0    naloxone (NARCAN) 4 MG/0.1ML nasal spray Spray 1 spray (4 mg) into one nostril alternating nostrils as needed for opioid reversal every 2-3 minutes until assistance arrives 0.2 mL 3    oxyCODONE IR (ROXICODONE) 10 MG tablet Take 1 tablet (10 mg) by mouth every 3 hours as needed for moderate pain 120 tablet 0    propranolol (INDERAL) 20 MG tablet Take 2 tablets (40 mg) by mouth 3 times daily 180 tablet 1    rivaroxaban ANTICOAGULANT (XARELTO) 20 MG TABS tablet Take 1 tablet (20 mg) by mouth daily (with dinner) 90 tablet 3     No current facility-administered medications for this visit.           Physical examination  BP (!) 147/104 (BP Location: Right arm, Patient Position: Sitting, Cuff Size: Adult Regular)   Pulse 71   Temp 98.5  F (36.9  C) (Oral)   Resp 16   Wt 99.2 kg (218 lb 11.2 oz)   SpO2 96%   BMI 32.30 kg/m          From a personal perspective, he is here with his wife, Kylie.      IMPRESSION   44 year old male patient with stage IV NSCLC and a possible RLL recurrence  Poorly controlled DM    Stage: Clinical stage IV    PLAN  I spent 40 min on the date of the encounter in chart review, patient visit, review of tests, documentation and/or discussion with other providers about the issues documented above. I reviewed the plan as follows:  1) Improve DM control:  I communicated with Dr. Mohr.   2) Lung nodule: At this time it is not clear if this is a recurrence or not. First and foremost, his DM must be under control. Although it would seem desirable to resect a recurrence, it will be a technically very challenging procedure, and most likely not worth the risk for a non-curative surgery. I strongly favor stereotactic radiation.    I appreciate the opportunity to participate in the care of your patient and will keep you updated.  Sincerely,    Delroy Fu MD

## 2024-06-26 NOTE — NURSING NOTE
Is the patient currently in the state of [place]? YES    Visit mode:VIDEO    If the visit is dropped, the patient can be reconnected by: VIDEO VISIT: Text to cell phone:   Telephone Information:   Mobile [phone]       Will anyone else be joining the visit? NO  (If patient encounters technical issues they should call [phone]    How would you like to obtain your AVS? MyChart    Are changes needed to the allergy or medication list? Pt stated no changes to allergies and Pt stated no med changes    Are refills needed on medications prescribed by this physician? NO    Reason for visit: RECHECK    Herber OCONNOR

## 2024-06-26 NOTE — LETTER
6/26/2024      Connor Emerson  [address]  [facility] [address]      Dear Colleague,    Thank you for referring your patient, Connor Emerson, to the [facility]. Please see a copy of my visit note below.    Virtual Visit Details    Type of service:  Video Visit   Video Start Time: 4:07 PM  Video End Time:4:07 PM    Originating Location (pt. Location): Home  {PROVIDER LOCATION On-site should be selected for visits conducted from your clinic location or adjoining [facility] hospital, academic office, or other nearby [facility] building. Off-site should be selected for all other provider locations, including home:[id number]}  Distant Location (provider location):  On-site  Platform used for Video Visit: [employer]    [facility] ONCOLOGY FOLLOW UP NOTE    PATIENT NAME: Connor Emerson  ENCOUNTER DATE:  June 26, 2024      Care Team  Primary Oncologist: Bassam Persaud MD    REASON FOR CURRENT VISIT: F/u of lung cancer    HISTORY OF PRESENT ILLNESS:  Mr. Connor Emerson is a 44 year old  male who is a non-smoker with PMHx of T2DM, HTN with metastatic NSCLC comes for follow up     Oncologic Hx:    Diagnosis:   Stage IV NSCLC, Rt lung adenocarcinoma with metastasis to pleura, mediastinum , rt pleural effusion and brain diagnosed 1/2022 (AJCC 8th edition)  PD-L1 TPS 2-3% by [place]   NGS [facility] panel-EML4:ALK rearragement  NGS [facility]- GNAS R201H, KRAS K5E- No ALK    Treatment:   2/23/2022- current: Brigatinib. Dose reduced to 60 mg due to cough after two days of 90 mg daily -->back on 90 mg---> increased to 180 mg  ---> settled on 120 mg    6/27/23- Right stealth craniotomy and resection of tumor:     7/20/23- 11/14/24: Bevacizumab for radiation necrosis. Discontinued due to severe HTN.    Past:  2/15/22- GK to 11 brain lesions  3/2/22- 12/2022 Alectinib 300 mg BID (Dose reduced to 450 mg BID from 600 mg BID due to grade 3 myalgias 3/21/22, again reduced to 300 mg BID 9/28/22)  6/28/22- Craniotomy,  resection  9/28/22-10/26- Bevacizumab for radiation necrosis (stopped due to PE)      Intent of treatment: Palliative    Oncologic course:  1/19/22 to 1/22/22-Admitted to [facility] for 2 week progressive SOB secondary to have large rt sided pleural effusion, needing thoracentesis x2 (1.7L and 2.0 L removed), cytology positive for malignancy, adenocarcinoma.   1/26/22- Rt pleural mass biopsy-Dr. Agrawal--POSITIVE FOR ADENOCARCINOMA CONSISTENT WITH LUNG PRIMARY, admixed with mesothelial hyperplasia and inflammatory infiltrate (+ TTF-1 and CK 7;  negative  p40, calretinin and WT-1. PAX8 immunostain focal +). 4th thoracentesis done simultaneously - 3L approx removed.   2/1/22- PET/CT-Right lower lobe central infiltrative FDG avid 8.2 x 9.6 cm mass representing a primary lung adenocarcinoma. Ipsilateral right perihilar, bilateral pretracheal, subcarinal and superior mediastinal michele metastases. Contralateral mildly FDG avid few lung nodules are suspicious for contralateral metastasis. At least 3 intracranial metastases in the right frontal lobe, left frontal lobe and left cerebellar hemisphere. Nonspecific mild diffuse bone marrow uptake. Further evaluation with a spine MRI could be considered to rule out early marrow infiltration. This could also be seen with red marrow conversion.  2/5/22-  Brain MRI- At least 9 intracranial metastases as detailed above. The dominant lesions involving the orbital right frontal lobe, the posterior left middle frontal gyrus, anterior right temporal lobe and in the left cerebellar hemisphere have surrounding moderate vasogenic type edema.  2/15/22- Saw Dr. Arango from Rad Onc- Rcd GK to 12 lesion in bran  2/16/22- Pleurex placement   3/2/22- Started Alectinib 600 mg BID  3/21/22- Dose reduced to 450 mg BID due to grade 3 myalgias and fatigue  4/2/22 to 4/5/22- Admitted at [facility] for- Severe sepsis due to MSSA infection of right PleurX catheter s/p removal- He presented with onset of pain at  tube site starting 4/1; at arrival was tachycardic with leukocytosis (22.7) and elevated lactic acid (2.9).  CT chest showed fluid and stranding tracking outside the pleural space into chest wall along pleural catheter.  IR was consulted and removed catheter 4/2 with report of pustular drainage and tip culture growing MSSA.  Thoracic Surg was consulted who felt no surgical indication necessary given minimal pleural fluid and lack of any signs of abscess.  Initially treated with broad spectrum coverage for sepsis, narrowed to Ancef once sensitivities returned with plan to transition to cefadroxil for an additional 10 days at discharge per ID. Held drug 4/2 to 4/11 5/2/22- CT CAP- Overall, positive response to therapy with decreased size of right lower lobe and right pleural-based masses, pulmonary metastases, hilar and mediastinal lymphadenopathy. However, a single right posterior pleural-based mass has slightly increased in size since 2/24/2022. No metastatic disease in the abdomen and pelvis. Right Pleurx catheter has been removed. Trace right pleural effusion and right basilar atelectasis.  5/2/22- Brain MRI- The previously demonstrated brain metastases are mildly diminished in size versus to 2/5/2022. The degree of edema is also diminished but not completely resolved. Probable trace amounts of intralesional bleeding demonstrated on the gradient sequence within the metastases. No definite new metastasis or progressive mass effect. No hydrocephalus or infarct.    6/15/22 to 6/17/22- Admitted at [facility]-with aphasia and word finding difficulty over last few weeks.  He presented to [facility] ED on 6/10 for evaluation of his symptoms. MRI brain showed multiple intracranial metastases, with interval enlargement of the dominant lesion within the left frontal lobe and increased surrounding vasogenic edema with 2 mm rightward shift of the septum pellucidum. Due to his worsening anxiety, he left AMA. His symptoms continued to  progress to where he could not write at work so he decided to go to the ED for re-evaluation and treatment. Evaluated by NSGY, Rad Onc (radiaiton necrosis vs tumor progression).  6/16/22- MR Brain (6/16) shows multiple intracranial metastases, with interval enlargement  of the dominant lesion within the left frontal lobe and increased surrounding vasogenic edema with 2 mm rightward shift of the septum pellucidum.  6/16/22- - CT CAP shows slightly decreased size of right lower lobe and right pleural-based masses. No new pulmonary nodules or lymphadenopathy; No evidence of metastatic disease in the abdomen or pelvis.   6/28/22 to 6/30/22- Admitted at [facility]- Elective left Stealth craniotomy with resection of brain tumor due to ongoing symptoms. No intraoperative complications. EBL 50 ml.  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  7/5/22- Ct CAP- Right lower lobe low-density nodules are not significantly changed. A small left upper lobe pulmonary nodule is also unchanged. Trace pleural fluid on the right has increased slightly. No convincing evidence for metastatic disease in the abdomen or pelvis.  7/18/22 to 7/19/22- Admitted to [facility] for seizure- Reportedly was only taking once Keppra instead of twice daily. Also resumed on dexamethasone 2 mg daily  8/1/22- Brain MRI- Redemonstrated postsurgical changes status post left frontoparietal Craniotomy. Interval increase in size of the dominant ring-enhancing lesion in the left posterior superior frontal lobe with increased moderate surrounding vasogenic edema and local mass effect resulting in narrowing of the supratentorial ventricular system. No significant midline shift/herniation at this time    8/1/22- Dex was increased to 4 mg daily by Dr. Moran    9/1/22- CT Chest- Near resolution of previously seen right pleural nodule. Stable right lower lobe pulmonary nodule    9/28/22- Bevacizumab for radiation necrosis    10/26/22- Bevacizumab     10/26/22- Ct CAP-  Stable posterior medial right lower lobe 1.9 x 1.1 cm nodule series 8 image 176. Adjacent stable scarring and atelectasis. The previously noted pleural nodule posteriorly on the right is not currently clearly identified. Stable left upper lobe 0.3 cm nodule image 56    10/26/22- Brain MRI- Overall improved appearance of multiple intracranial metastases with near resolution of associated edema and diminished enhancement and size of multiple residual lesions. No definite new or progressive metastasis.    11/7/22 to 11/9/22- Admitted for PE and HTN urgency- Small pulmonary embolism in the right lower lobe pulmonary artery. started on Lovenox, Brain MRI neg for PRES.    12/29/22- ED visit- bilateral hip pain, pain in shoulders, knuckles, knees, and ankles- holding alectinib since 12/20/22 1/6/23- Ct CAP- Mild groundglass nodularity in the left upper lobe is new since the previous exam, and may be infectious in etiology. No other significant interval change. Pulmonary nodules are not significantly changed.    1/6/23- Brain MRI- Stable to diminished sequelae of intracranial metastasis and treatment changes. No new or progressive metastasis. No superimposed acute intracranial finding.     2/23/2022- Start Brigatinib. Dose reduced to 60 mg due to cough after two days of 90 mg daily -  3/23/23-Brigatinib  (increase to 90 mg)    4/20/23- CT CAP- Stable- Previously noted mild groundglass nodularity in the left upper lobe has resolved.Pulmonary nodules are unchanged.Trace amount pleural fluid on the right has decreased slightly.    4/20/23- Brain MRI- Possile progression- Largest metastasis within the right frontal lobe has increased in size with worsening peripheral nodular enhancement and worsening vasogenic edema contributing to new right to left midline shift.  Multiple new/enlarging metastases scattered throughout the cerebral hemispheres and cerebellum. Started on dexamethasone by NGS on 4/28/23 5/17/23- presents to  clinic with glucose 627, admission to hospital for stability on insulin drip    6/16/23- Brain MRI- Interval increase in size of the necrotic lesion in the anterior aspect of the right frontal lobe from 2.4 x 2.3 x 2.4 cm previously to 3.0 x 3.5 x 2.8 cm on the current study. Mass effect due to slightly increased vasogenic edema surrounding the right frontal lesion resulted in increase of leftward midline shift of the anterior interhemispheric fissure from 0.7 cm previously to 0.9 cm currently. Interval increase in size in two adjacent metastases at the frontoparietal junction on the left. The remaining scattered intra-axial enhancing nodules are not changed appreciably in size or appearance since the comparison study.No evidence for acute intracranial pathology.   Dr. Moran- Due to worsening headaches and more problems with walking. Recommended a right Stealth craniotomy for resection of the lesion.     6/27/23- Right stealth craniotomy and resection of tumor: Final path: radiation necrosis    7/10/23- Ct CAP- stable Stable exam without evidence for new disease.Stable pulmonary nodules including a dominant nodular opacity at the right lower lobe.    7/20/23- Bevacizumab for radiation necrosis    8/16/23- Bevacizumab     9/12/23- Ct CAP-  Stable right lower lobe dominant nodular opacity. No new disease in the chest, abdomen and pelvis.     9/15, 10/6, 10/27, 11/17-  Bevacizumab    10/25/23- MRI Brain- 1. Redemonstrated postoperative changes of right frontal craniotomy. Decreased size of the right frontal resection cavity with significant decrease in the surrounding right frontal lobe vasogenic edema seen on the previous study. Mass effect has essentially resolved in the interim and there is no longer any midline shift. In the interim, slightly increased thickness of a rind of peripheral nodular enhancement along the posterior inferior and medial aspects of the right frontal lobe resection cavity, indeterminate. There  is no significant elevated cerebral blood volume in this area. The findings may represent evolving posttreatment changes; however, residual tumor is not excluded.  Stable postoperative changes status post left anterior parietal craniotomy with irregular enhancement in the left frontal lobe parenchyma underlying the resection cavity, likely due to posttreatment change. Other scattered supratentorial and infratentorial metastatic lesions are overall similar to slightly decreased in size, as described.    12/5/23- PET/CT- with sole site of disease in the RLL measuring 1.6 x 1.4 cm and with SUV max of 4.2. No other sites of disease. His case was reviewed at tumor board and he met with Dr. Fu 12/14/24 with recommendations against surgical resection due to risk of significant pleural scarring. Continued on brigatinib 120 mg daily.     1/29/24 Brain MRI: No new metastatic lesions. No significant change in supratentorial and infratentorial subcentimeter metastatic enhancing lesions. Stable right inferior frontal and left high frontal postsurgical changes.     12/22-current: lost to follow up due to insurance issues    3/15/24- CT chest- Stable nodule medial right lower lobe. No metastatic disease demonstrated.    6/21/24- CT CAP- stable      Interval Hx:  Feeling well overall  He feels sluggish,   Notes he falls asleep around 5:30pm in the evenings and sleeps through the night; states he does no feel he gets adequate rest despite getting significant amounts of sleep.  he started ritalin but has not made much difference  Working 8 hrs wears him out, makes him super tired  The right sided pain is now controlled, taking methadone and oxycodone prn, takes at least oxy 6/day  Taking brigatinib as instructed, has missed for 7 days when his lipase came back high  No new headaches, notes some new vision changes (things are not clear), no filed cuts  Appetite is suppressed, eats only 1/day, weight si stable  Increased cravings  for sweets; states he will go periods without eating due to decreased appetite and then craves sugary foods. His wife has tried keeping sugary foods out of the house.  Does not frequently check blood sugars at home  Would like to start drinking protein shakes with meals daily    Has increased anxiety about his treatment plan; states he is nervous he may need surgery. Has already requested 4 months off of work for possible surgery. States he would prefer to undergo radiation therapy if recommended.    Recently spent time in [place] with his father which he enjoyed    ECOG PS 0-1    REVIEW OF SYSTEMS: 14 point ROS negative other than the symptoms noted above in the HPI.    Wt Readings from Last 4 Encounters:   06/19/24 98.5 kg (217 lb 1.6 oz)   06/06/24 97.1 kg (214 lb)   06/05/24 98.3 kg (216 lb 11.2 oz)   03/21/24 98.4 kg (217 lb)      Review of Systems:  A comprehensive ROS was performed and found to be negative or non-contributory with the exception of that noted in the HPI above.    Past Medical History:  GERD  Hypertension, not on medication  Type 2 diabetes mellitus, not on medications currently, previously on Metformin    Past Surgical History:  Past Surgical History:   Procedure Laterality Date     BRONCHOSCOPY RIGID OR FLEXIBLE W/TRANSENDOSCOPIC ENDOBRONCHIAL ULTRASOUND GUIDED Bilateral 1/26/2022    Procedure: Right BRONCHOSCOPY, FIBEROPTIC, endobronchial ultrasound, pleural biopsy;  Surgeon: Dallin Agrawal MD;  Location: [facility] OR     INJECT BLOCK MEDIAL BRANCH CERVICAL/THORACIC/LUMBAR       INSERT CHEST TUBE Right 2/16/2022    Procedure: INSERTION, CATHETER, INTERCOSTAL, FOR DRAINAGE;  Surgeon: Dallin Agrawal MD;  Location: [facility] GI     INSERT CHEST TUBE Right 3/9/2022    Procedure: INSERTION, CATHETER, INTERCOSTAL, FOR DRAINAGE;  Surgeon: Sushila Antonio MD;  Location: [facility] GI     IR CHEST TUBE REMOVAL TUNNELED RIGHT  4/2/2022     OPTICAL TRACKING SYSTEM CRANIOTOMY, EXCISE TUMOR, COMBINED Left 6/28/2022     Procedure: Left stealth craniotomy for tumor resection with motor mapping;  Surgeon: Stephen Moran MD;  Location:  OR     OPTICAL TRACKING SYSTEM CRANIOTOMY, EXCISE TUMOR, COMBINED Right 2023    Procedure: Right stealth craniotomy and resection of tumor;  Surgeon: Stephen Moran MD;  Location:  OR     ORTHOPEDIC SURGERY      Ganesh. Rotator cuff repair.     PLEUROSCOPY N/A 2022    Procedure: Pleuroscopy with Pleural Biopsy;  Surgeon: Dallin Agrawal MD;  Location:  OR       Social History:  Lives with wife and 4 kids in [place]. Works as a  for an apartment complex in [place]. Exposure to household chemicals and . No significant exposure to asbestos. No signal exposure to benzene or similar chemicals. No significant smoking history-states that he smoked 1 to 2 cigarettes occasionally per month for about 2 years in college, non-smoking since then. No significant alcohol use history. No other recreational substances. Good support system. Kids are 23, 19, 17 and 13.    Family History  Significant history for cancers on maternal side. Mother  of uterine cancer. 2 maternal uncles have possible metastatic melanoma.    Outpatient Medications:  Current Outpatient Medications   Medication Sig Dispense Refill     acetaminophen (TYLENOL) 325 MG tablet Take 325-650 mg by mouth every 6 hours as needed for mild pain       albuterol (PROAIR HFA/PROVENTIL HFA/VENTOLIN HFA) 108 (90 Base) MCG/ACT inhaler Inhale 2 puffs into the lungs every 6 hours as needed for shortness of breath, wheezing or cough 18 g 0     Alcohol Swabs PADS Use to swab the area of the injection or jabier as directed. Per insurance coverage 100 each 0     baclofen (LIORESAL) 10 MG tablet Take 0.5-1 tablets (5-10 mg) by mouth 3 times daily as needed for other (hiccups) 60 tablet 4     blood glucose (NO BRAND SPECIFIED) lancets standard To use to test glucose level in the blood Use to test blood sugar   4  times daily as directed. To accompany glucose monitor brands per insurance coverage. 100 each 3     blood glucose (NO BRAND SPECIFIED) test strip To use to test glucose level in the blood Use to test blood sugar  4 times daily as directed. To accompany glucose monitor brands per insurance coverage. 100 strip 3     Blood Glucose Monitoring Suppl (ACCU-CHEK GUIDE) w/Device KIT        brigatinib (ALUNBRIG) 30 MG TABS tablet Take 4 tablets (120 mg) by mouth daily May be taken with or without food. Swallow whole. Do not crush or chew tablets. 120 tablet 2     DULoxetine (CYMBALTA) 30 MG capsule Take 1 capsule (30 mg) by mouth 2 times daily 60 capsule 2     FLUoxetine (PROZAC) 20 MG capsule Take 1 capsule (20 mg) by mouth daily 30 capsule 1     hydrALAZINE (APRESOLINE) 50 MG tablet Take 1 tablet (50 mg) by mouth 2 times daily 180 tablet 3     hydrochlorothiazide (HYDRODIURIL) 50 MG tablet Take 1 tablet (50 mg) by mouth daily 30 tablet 1     insulin aspart (NOVOLOG PEN) 100 UNIT/ML pen Inject 0-40 Units Subcutaneous 3 times daily (before meals) Per discharge instructions sliding scale 45 mL 2     insulin glargine (LANTUS PEN) 100 UNIT/ML pen Inject 70 Units Subcutaneous every morning 15 mL 4     insulin pen needle (32G X 4 MM) 32G X 4 MM miscellaneous Use as directed by provider. Per insurance coverage 100 each 0     levETIRAcetam (KEPPRA) 1000 MG tablet Take 1 tablet (1,000 mg) by mouth 2 times daily 60 tablet 11     lisinopril (ZESTRIL) 40 MG tablet Take 1 tablet (40 mg) by mouth daily 90 tablet 1     methadone (DOLOPHINE) 5 MG tablet Take 1.5 tablets (7.5 mg) by mouth 2 times daily 90 tablet 0     methylphenidate (RITALIN) 5 MG tablet Take 1-2 tablets (5-10 mg) by mouth 2 times daily as needed (fatigue) Take second dose by 12 noon, if it is needed 90 tablet 0     naloxone (NARCAN) 4 MG/0.1ML nasal spray Spray 1 spray (4 mg) into one nostril alternating nostrils as needed for opioid reversal every 2-3 minutes until  assistance arrives 0.2 mL 3     oxyCODONE IR (ROXICODONE) 10 MG tablet Take 1 tablet (10 mg) by mouth every 3 hours as needed for moderate pain 120 tablet 0     propranolol (INDERAL) 20 MG tablet Take 2 tablets (40 mg) by mouth 3 times daily 180 tablet 1     rivaroxaban ANTICOAGULANT (XARELTO) 20 MG TABS tablet Take 1 tablet (20 mg) by mouth daily (with dinner) 90 tablet 3     No current facility-administered medications for this visit.     There were no vitals taken for this visit.  General: No acute distress, no rashes on skin. Tenderness to right upper flank with deep palpation. No erythema or obvious masses visualized. Tissue density palpable, but is somewhat mobile and soft.  HEENT: Sclera anicteric. Oral mucosa pink and moist.  No mucositis or thrush  Heart: Regular, rate, and rhythm  Lungs: Clear to ascultation bilaterally. Breathing comfortably  Abdomen: Positive bowel sounds. Soft, non-distended, non-tender.   Extremities: no lower extremity edema  Neuro: Cranial nerves grossly intact      Labs & Studies: I personally reviewed the following studies:  Most Recent 3 CBC's:  Recent Labs   Lab Test 06/19/24  1056 03/20/24  1632 12/28/23  0755   WBC 9.5 11.8* 11.5*   HGB 14.9 15.0 16.3   MCV 90 90 91    252 214     Most Recent 3 BMP's:  Recent Labs   Lab Test 06/19/24  1056 06/05/24  1347 03/20/24  1632    140 142   POTASSIUM 4.6 4.0 3.9   CHLORIDE 103 104 103   CO2 27 25 27   BUN 20.5* 14.8 18.5   CR 0.74 0.67 0.71   ANIONGAP 10 11 12   TORY 9.8 9.2 9.3   * 292*  292* 175*    Most Recent 2 LFT's:  Recent Labs   Lab Test 06/19/24  1056 06/05/24  1347   AST 15 18   ALT 18 16   ALKPHOS 111 107   BILITOTAL 0.5 0.7    Most Recent TSH and T4:  Recent Labs   Lab Test 09/12/22  1642   TSH 2.56        ASSESSMENT AND PLAN:  Stage IV NSCLC, Rt lung adenocarcinoma with metastasis to pleura, mediastinum , rt pleural effusion and brain diagnosed 1/2022 (AJCC 8th edition)  PD-L1 TPS 2-3% by [place]    NGS [facility] panel-EML4:ALK rearragement; chr2:[id number], chr2:[id number]  NGS [facility]- GNAS R201H, KRAS K5E- No ALK    He began Alectinib 600 mg BID 3/2/22 and unfortunately developed grade 3 myalgias which have improved with lowering the dose 450 mg BID. He was holding drug 4/2/22 to 4/11/22 due to MSSA infection from pleurex which is removed. Resumed at 450 mg BID. Due to ongoing myalgias (Although CK is normal), we dose reduced to 300 mg BID.   In Dec 2022, developed Gr 3 arthralgia, and we stopped drug 12/20/22. Had unremitting arthalgias, eventully had to starte PO steroids which led to improvement and resolved. Radiographicaly, he has had a near CR to Rx in the lung, has a residual rt LL lesion.     Began brigatinib 2/22/23 (delayed due to pt hesitancy). Developed severe cough on day 2 so brigatinib was held and cough resolved with in 24-48 hours. He restarted 60 mg daily and upped it to 90 mg.Restging CT showing stable disease in the lung, however, MRI with several contrast enhancement and increase in size of previously treated lesions. His dose was increased to 180 mg daily to address possible CNS disease progression and started on dexamethasone. 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 Restaging CT in 9/2023 showing overall disease stablity with no evidence of active cancer. 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  PET/CT after these three months showed oligoperisstent disease with a single focus of active malignancy in the RLL. Met with Thoracic surgery 12/2023 to discuss resection but they recommended against it due to risk for scarring after. 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 MOst recent CT showing stable disease but brain MRI showing several  possible new lesions and enlargement of exissting lesion (see below). Will review Brain MRI with rad onc and NSGY. If concern for true progression, will likely switch to lorlatinib.    Plan  - Continue brigatinib at 120 mg for now  - RTC with me in July 8        # Brain mets:   # Radiation necrosis,   -Baseline Brain MRI with several brain mets, s/p GK to 11-12 brain mets. F/u Brain MRI in June 2022 was showing enlargement of the one of the lesions along with edema, therefore had to undergo craniotomy followed by resection, the final biopsy consistent with radiation necrosis.   -received two doses bevacizumab for radiation necrosis in Fall 2022, tolerated poorly with HTN urgency and PE.)   -MRI in 4/2023 now showing contrast enhancement of lesions and a dominate lesion in the R frontal region with edema, several other smaller lesion. Short term MRI showing increase in size of the rt frontal lesion, underwent resection, patho again showing radiation necrosis. Restarted bevacizumab, which resulted in improved radiation necrosis / vasogenic edema on imaging in 10/2023 but ultimately was stopped due to uncontrolled HTN, last dose being 11/2023  -MRI imaging 1/2024 with stable disease and no edema.  -Brain MRI 3/2024 cancelled due to lapse in medical insurance.  -Most recent MRI 6/2024 showing showing several possible new lesions and enlargement of exissting lesion, however I am unsure if this is due to better imaging (perfusion MRI this time).I am unsure if these are truly new lesions or we are seeing them because of better imaging techniques and are these lesions in the same spot as previously treated ones. I will review this with Dr. Osborn and Dr. Arango  I will likley change his sytemic Rx if this is true progression.     #Right pleuritic/chest pain  Felt to be 2/2 prior pleurex drain. No acute resp changes today. Pain now improving on increased dose of methadone and prn oxy  -On methadone and oxycodone; followed by  Palliative Care a     #Elevated Lipase  -mild elevation. No concern on exam    #Diabetes, Type 2  Hyperglycemia on labs.On insulin but not very adherent to diet. Discussed dietary recommendations as it seems he has been eating a lot of sugary foods lately.   Etablsihed with PCP 6/19 who is now managing     #PE: provoked by malignancy vs bevacizumab in 11/2022  -- on rivaroxiabn 20 mg daily    #Grade 2-3 arthralgias    All joints affected, no morning stiffness, normal ESR and CRP  -better on brigatinib vs alectinib.  -stable at this time with pain mgmt    #HTN: elevated today but much improved since HTN urgency levels while he was on Fe.   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 Due to significant time spent with medication management, will monitor at this time. Recheck 6/12 as scheduled      On the day of service  Chart review: 5 minutes  Visit duration: 30 minutes  Care coordination: 5 minutes    Bassam Persaud MD    Hematology, Oncology and Transplantation               Again, thank you for allowing me to participate in the care of your patient.        Sincerely,        Bassam Persaud MD

## 2024-06-26 NOTE — NURSING NOTE
"Oncology Rooming Note    June 26, 2024 10:17 AM   Connor Emerson is a 44 year old male who presents for:    Chief Complaint   Patient presents with    Oncology Clinic Visit     RTN for Malignant Neoplasm of right lung     Initial Vitals: BP (!) 147/104 (BP Location: Right arm, Patient Position: Sitting, Cuff Size: Adult Regular)   Pulse 71   Temp 98.5  F (36.9  C) (Oral)   Resp 16   Wt 99.2 kg (218 lb 11.2 oz)   SpO2 96%   BMI 32.30 kg/m   Estimated body mass index is 32.3 kg/m  as calculated from the following:    Height as of 6/19/24: 1.753 m (5' 9\").    Weight as of this encounter: 99.2 kg (218 lb 11.2 oz). Body surface area is 2.2 meters squared.  Moderate Pain (5) Comment: Data Unavailable   No LMP for male patient.  Allergies reviewed: Yes  Medications reviewed: Yes    Medications: Medication refills not needed today.  Pharmacy name entered into EPIC:    [facility] [place], [place] - [address] [facility] [place] [address] [place] [address]  [facility] [place] [id number] [facility] [place], [place] - [address] [place] [address] PHARMACY [place], [place] - [phone]  [facility] - [place], [place] - [address] [address]    Frailty Screening:   Is the patient here for a new oncology consult visit in cancer care? 2. No      Clinical concerns:  None      Rnezo Morrow              "

## 2024-06-27 ENCOUNTER — TELEPHONE (OUTPATIENT)
Facility: CLINIC | Age: 44
End: 2024-06-27
Payer: MEDICAID

## 2024-06-27 NOTE — TELEPHONE ENCOUNTER
Received call from Pharmacist last night that the patient has had a dose increase, would like us to try for an updated PA as the other one only covers #90 and its now #120.    Please Expedite as patient is out.    Prior Authorization Retail Medication Request    Medication/Dose: Oxycodone- QTY increase  Diagnosis and ICD code (if different than what is on RX):    New/renewal/insurance change PA/secondary ins. PA:  Previously Tried and Failed:    Rationale:  QTY increase from last approval    Insurance   Primary: MEDICAID  Insurance ID:  [id number]    Secondary (if applicable):  Insurance ID:      Pharmacy Information (if different than what is on RX)  Name:  [facility]  Phone:  [phone]  Fax:

## 2024-06-27 NOTE — TELEPHONE ENCOUNTER
Retail Pharmacy Prior Authorization Team   Phone: [phone]    PA Initiation-Quantity Limit Increase-CMM Key [id number]    Medication: OXYCODONE HCL 10 MG PO TABS  Insurance Company: [place] Medicaid ([facility]) - Phone [phone] Fax [phone]  Pharmacy Filling the Rx: [place], [place] - [address]  Filling Pharmacy Phone: [phone]  Filling Pharmacy Fax: [phone]  Start Date: 6/27/2024

## 2024-06-28 DIAGNOSIS — D49.6 BRAIN TUMOR (H): ICD-10-CM

## 2024-06-28 DIAGNOSIS — Z98.890 S/P CRANIOTOMY: ICD-10-CM

## 2024-06-28 RX ORDER — OXYCODONE HYDROCHLORIDE 10 MG/1
10 TABLET ORAL
Qty: 120 TABLET | Refills: 0 | Status: SHIPPED | OUTPATIENT
Start: 2024-06-28 | End: 2024-07-07

## 2024-06-28 NOTE — TELEPHONE ENCOUNTER
Prior Authorization Approval    Medication: OXYCODONE HCL 10 MG PO TABS  Authorization Effective Date: 6/28/2024  Authorization Expiration Date: 6/30/2024  Approved Dose/Quantity:   Reference #: [id number]   Insurance Company: [place] Medicaid ([facility]) - Phone [phone] Fax [phone]  Expected CoPay: $    CoPay Card Available:      Financial Assistance Needed:   Which Pharmacy is filling the prescription: [facility] [place] - [address]  Pharmacy Notified: Yes  Patient Notified:

## 2024-06-28 NOTE — TELEPHONE ENCOUNTER
I got a note from [facility] saying this has been canceled because there is already a paid claim at the pharmacy for the qty of 90 originally approved. I spent more than 30 minutes on the phone with Wyatt at [facility] to get them to re-look at this so he can fill the 15-day supply and not run out while we initiate a PA with his new insurance that will be effective on 7/1. Wyatt said this is approved on PA # [id number].

## 2024-07-02 DIAGNOSIS — C79.31 METASTASIS TO BRAIN (H): Primary | ICD-10-CM

## 2024-07-07 ENCOUNTER — MYC REFILL (OUTPATIENT)
Dept: PALLIATIVE CARE | Facility: CLINIC | Age: 44
End: 2024-07-07
Payer: COMMERCIAL

## 2024-07-07 DIAGNOSIS — D49.6 BRAIN TUMOR (H): ICD-10-CM

## 2024-07-07 DIAGNOSIS — Z98.890 S/P CRANIOTOMY: ICD-10-CM

## 2024-07-08 ENCOUNTER — VIRTUAL VISIT (OUTPATIENT)
Dept: ONCOLOGY | Facility: CLINIC | Age: 44
End: 2024-07-08
Attending: STUDENT IN AN ORGANIZED HEALTH CARE EDUCATION/TRAINING PROGRAM
Payer: COMMERCIAL

## 2024-07-08 VITALS — WEIGHT: 217 LBS | HEIGHT: 69 IN | BODY MASS INDEX: 32.14 KG/M2

## 2024-07-08 DIAGNOSIS — E78.00 HYPERCHOLESTEREMIA: ICD-10-CM

## 2024-07-08 DIAGNOSIS — C34.31 MALIGNANT NEOPLASM OF LOWER LOBE OF RIGHT LUNG (H): Primary | ICD-10-CM

## 2024-07-08 DIAGNOSIS — I67.89 RADIATION THERAPY INDUCED BRAIN NECROSIS: ICD-10-CM
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
## 2024-07-08 DIAGNOSIS — C34.90 NON-SMALL CELL LUNG CANCER, UNSPECIFIED LATERALITY (H): ICD-10-CM

## 2024-07-08 PROCEDURE — 99215 OFFICE O/P EST HI 40 MIN: CPT | Mod: 95 | Performed by: STUDENT IN AN ORGANIZED HEALTH CARE EDUCATION/TRAINING PROGRAM

## 2024-07-08 RX ORDER — PROCHLORPERAZINE MALEATE 10 MG
10 TABLET ORAL EVERY 6 HOURS PRN
Qty: 30 TABLET | Refills: 2 | Status: SHIPPED | OUTPATIENT
Start: 2024-07-14

## 2024-07-08 RX ORDER — OXYCODONE HYDROCHLORIDE 10 MG/1
10 TABLET ORAL
Qty: 120 TABLET | Refills: 0 | Status: ON HOLD | OUTPATIENT
Start: 2024-07-12 | End: 2024-07-20

## 2024-07-08 ASSESSMENT — PAIN SCALES - GENERAL: PAINLEVEL: NO PAIN (0)

## 2024-07-08 NOTE — NURSING NOTE
Current patient location:  [facility] per pt    Is the patient currently in the state of [place]? YES    Visit mode:VIDEO    If the visit is dropped, the patient can be reconnected by: VIDEO VISIT: Text to cell phone:   Telephone Information:   Mobile [phone]       Will anyone else be joining the visit? NO  (If patient encounters technical issues they should call [phone]    How would you like to obtain your AVS? MyChart    Are changes needed to the allergy or medication list? Pt stated no med changes    Are refills needed on medications prescribed by this physician? NO    Reason for visit: RECHECK    No other vitals to report per pt    Purnima MORALESF

## 2024-07-08 NOTE — LETTER
7/8/2024      Connor Emerson  [address]  [facility] [address]      Dear Colleague,    Thank you for referring your patient, Connor Emerson, to the [facility]. Please see a copy of my visit note below.      Virtual Visit Details    Type of service:  Video Visit   Video Start Time: 4:25 PM  Video End Time:4:25 PM    Originating Location (pt. Location): Home  {PROVIDER LOCATION On-site should be selected for visits conducted from your clinic location or adjoining [facility] hospital, academic office, or other nearby [facility] building. Off-site should be selected for all other provider locations, including home:[id number]}  Distant Location (provider location):  On-site  Platform used for Video Visit: [employer]      [facility] ONCOLOGY FOLLOW UP NOTE    PATIENT NAME: Connor Emerson  ENCOUNTER DATE:  July 8, 2024      Care Team  Primary Oncologist: Bassam Persaud MD    REASON FOR CURRENT VISIT: F/u of lung cancer    HISTORY OF PRESENT ILLNESS:  Mr. Connor Emerson is a 44 year old  male who is a non-smoker with PMHx of T2DM, HTN with metastatic NSCLC comes for follow up     Oncologic Hx:    Diagnosis:   Stage IV NSCLC, Rt lung adenocarcinoma with metastasis to pleura, mediastinum , rt pleural effusion and brain diagnosed 1/2022 (AJCC 8th edition)  PD-L1 TPS 2-3% by [place]   NGS [facility] panel-EML4:ALK rearragement  NGS Guardant- GNAS R201H, KRAS K5E- No ALK    Treatment:   2/23/2022- current: Brigatinib. Dose reduced to 60 mg due to cough after two days of 90 mg daily -->back on 90 mg---> increased to 180 mg  ---> settled on 120 mg    6/27/23- Right stealth craniotomy and resection of tumor:     7/20/23- 11/14/24: Bevacizumab for radiation necrosis. Discontinued due to severe HTN.    Past:  2/15/22- GK to 11 brain lesions  3/2/22- 12/2022 Alectinib 300 mg BID (Dose reduced to 450 mg BID from 600 mg BID due to grade 3 myalgias 3/21/22, again reduced to 300 mg BID 9/28/22)  6/28/22- Craniotomy,  resection  9/28/22-10/26- Bevacizumab for radiation necrosis (stopped due to PE)      Intent of treatment: Palliative    Oncologic course:  1/19/22 to 1/22/22-Admitted to [facility] for 2 week progressive SOB secondary to have large rt sided pleural effusion, needing thoracentesis x2 (1.7L and 2.0 L removed), cytology positive for malignancy, adenocarcinoma.   1/26/22- Rt pleural mass biopsy-Dr. Agrawal--POSITIVE FOR ADENOCARCINOMA CONSISTENT WITH LUNG PRIMARY, admixed with mesothelial hyperplasia and inflammatory infiltrate (+ TTF-1 and CK 7;  negative  p40, calretinin and WT-1. PAX8 immunostain focal +). 4th thoracentesis done simultaneously - 3L approx removed.   2/1/22- PET/CT-Right lower lobe central infiltrative FDG avid 8.2 x 9.6 cm mass representing a primary lung adenocarcinoma. Ipsilateral right perihilar, bilateral pretracheal, subcarinal and superior mediastinal michele metastases. Contralateral mildly FDG avid few lung nodules are suspicious for contralateral metastasis. At least 3 intracranial metastases in the right frontal lobe, left frontal lobe and left cerebellar hemisphere. Nonspecific mild diffuse bone marrow uptake. Further evaluation with a spine MRI could be considered to rule out early marrow infiltration. This could also be seen with red marrow conversion.  2/5/22-  Brain MRI- At least 9 intracranial metastases as detailed above. The dominant lesions involving the orbital right frontal lobe, the posterior left middle frontal gyrus, anterior right temporal lobe and in the left cerebellar hemisphere have surrounding moderate vasogenic type edema.  2/15/22- Saw Dr. Arango from Rad Onc- Rcd GK to 12 lesion in bran  2/16/22- Pleurex placement   3/2/22- Started Alectinib 600 mg BID  3/21/22- Dose reduced to 450 mg BID due to grade 3 myalgias and fatigue  4/2/22 to 4/5/22- Admitted at [facility] for- Severe sepsis due to MSSA infection of right PleurX catheter s/p removal- He presented with onset of pain at  tube site starting 4/1; at arrival was tachycardic with leukocytosis (22.7) and elevated lactic acid (2.9).  CT chest showed fluid and stranding tracking outside the pleural space into chest wall along pleural catheter.  IR was consulted and removed catheter 4/2 with report of pustular drainage and tip culture growing MSSA.  Thoracic Surg was consulted who felt no surgical indication necessary given minimal pleural fluid and lack of any signs of abscess.  Initially treated with broad spectrum coverage for sepsis, narrowed to Ancef once sensitivities returned with plan to transition to cefadroxil for an additional 10 days at discharge per ID. Held drug 4/2 to 4/11 5/2/22- CT CAP- Overall, positive response to therapy with decreased size of right lower lobe and right pleural-based masses, pulmonary metastases, hilar and mediastinal lymphadenopathy. However, a single right posterior pleural-based mass has slightly increased in size since 2/24/2022. No metastatic disease in the abdomen and pelvis. Right Pleurx catheter has been removed. Trace right pleural effusion and right basilar atelectasis.  5/2/22- Brain MRI- The previously demonstrated brain metastases are mildly diminished in size versus to 2/5/2022. The degree of edema is also diminished but not completely resolved. Probable trace amounts of intralesional bleeding demonstrated on the gradient sequence within the metastases. No definite new metastasis or progressive mass effect. No hydrocephalus or infarct.    6/15/22 to 6/17/22- Admitted at [facility]-with aphasia and word finding difficulty over last few weeks.  He presented to [facility] ED on 6/10 for evaluation of his symptoms. MRI brain showed multiple intracranial metastases, with interval enlargement of the dominant lesion within the left frontal lobe and increased surrounding vasogenic edema with 2 mm rightward shift of the septum pellucidum. Due to his worsening anxiety, he left AMA. His symptoms continued to  progress to where he could not write at work so he decided to go to the ED for re-evaluation and treatment. Evaluated by NSGY, Rad Onc (radiaiton necrosis vs tumor progression).  6/16/22- MR Brain (6/16) shows multiple intracranial metastases, with interval enlargement  of the dominant lesion within the left frontal lobe and increased surrounding vasogenic edema with 2 mm rightward shift of the septum pellucidum.  6/16/22- - CT CAP shows slightly decreased size of right lower lobe and right pleural-based masses. No new pulmonary nodules or lymphadenopathy; No evidence of metastatic disease in the abdomen or pelvis.   6/28/22 to 6/30/22- Admitted at [facility]- Elective left Stealth craniotomy with resection of brain tumor due to ongoing symptoms. No intraoperative complications. EBL 50 ml.  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  7/5/22- Ct CAP- Right lower lobe low-density nodules are not significantly changed. A small left upper lobe pulmonary nodule is also unchanged. Trace pleural fluid on the right has increased slightly. No convincing evidence for metastatic disease in the abdomen or pelvis.  7/18/22 to 7/19/22- Admitted to [facility] for seizure- Reportedly was only taking once Keppra instead of twice daily. Also resumed on dexamethasone 2 mg daily  8/1/22- Brain MRI- Redemonstrated postsurgical changes status post left frontoparietal Craniotomy. Interval increase in size of the dominant ring-enhancing lesion in the left posterior superior frontal lobe with increased moderate surrounding vasogenic edema and local mass effect resulting in narrowing of the supratentorial ventricular system. No significant midline shift/herniation at this time    8/1/22- Dex was increased to 4 mg daily by Dr. Moran    9/1/22- CT Chest- Near resolution of previously seen right pleural nodule. Stable right lower lobe pulmonary nodule    9/28/22- Bevacizumab for radiation necrosis    10/26/22- Bevacizumab     10/26/22- Ct CAP-  Stable posterior medial right lower lobe 1.9 x 1.1 cm nodule series 8 image 176. Adjacent stable scarring and atelectasis. The previously noted pleural nodule posteriorly on the right is not currently clearly identified. Stable left upper lobe 0.3 cm nodule image 56    10/26/22- Brain MRI- Overall improved appearance of multiple intracranial metastases with near resolution of associated edema and diminished enhancement and size of multiple residual lesions. No definite new or progressive metastasis.    11/7/22 to 11/9/22- Admitted for PE and HTN urgency- Small pulmonary embolism in the right lower lobe pulmonary artery. started on Lovenox, Brain MRI neg for PRES.    12/29/22- ED visit- bilateral hip pain, pain in shoulders, knuckles, knees, and ankles- holding alectinib since 12/20/22 1/6/23- Ct CAP- Mild groundglass nodularity in the left upper lobe is new since the previous exam, and may be infectious in etiology. No other significant interval change. Pulmonary nodules are not significantly changed.    1/6/23- Brain MRI- Stable to diminished sequelae of intracranial metastasis and treatment changes. No new or progressive metastasis. No superimposed acute intracranial finding.     2/23/2022- Start Brigatinib. Dose reduced to 60 mg due to cough after two days of 90 mg daily -  3/23/23-Brigatinib  (increase to 90 mg)    4/20/23- CT CAP- Stable- Previously noted mild groundglass nodularity in the left upper lobe has resolved.Pulmonary nodules are unchanged.Trace amount pleural fluid on the right has decreased slightly.    4/20/23- Brain MRI- Possile progression- Largest metastasis within the right frontal lobe has increased in size with worsening peripheral nodular enhancement and worsening vasogenic edema contributing to new right to left midline shift.  Multiple new/enlarging metastases scattered throughout the cerebral hemispheres and cerebellum. Started on dexamethasone by NGS on 4/28/23 5/17/23- presents to  clinic with glucose 627, admission to hospital for stability on insulin drip    6/16/23- Brain MRI- Interval increase in size of the necrotic lesion in the anterior aspect of the right frontal lobe from 2.4 x 2.3 x 2.4 cm previously to 3.0 x 3.5 x 2.8 cm on the current study. Mass effect due to slightly increased vasogenic edema surrounding the right frontal lesion resulted in increase of leftward midline shift of the anterior interhemispheric fissure from 0.7 cm previously to 0.9 cm currently. Interval increase in size in two adjacent metastases at the frontoparietal junction on the left. The remaining scattered intra-axial enhancing nodules are not changed appreciably in size or appearance since the comparison study.No evidence for acute intracranial pathology.   Dr. Moran- Due to worsening headaches and more problems with walking. Recommended a right Stealth craniotomy for resection of the lesion.     6/27/23- Right stealth craniotomy and resection of tumor: Final path: radiation necrosis    7/10/23- Ct CAP- stable Stable exam without evidence for new disease.Stable pulmonary nodules including a dominant nodular opacity at the right lower lobe.    7/20/23- Bevacizumab for radiation necrosis    8/16/23- Bevacizumab     9/12/23- Ct CAP-  Stable right lower lobe dominant nodular opacity. No new disease in the chest, abdomen and pelvis.     9/15, 10/6, 10/27, 11/17-  Bevacizumab    10/25/23- MRI Brain- 1. Redemonstrated postoperative changes of right frontal craniotomy. Decreased size of the right frontal resection cavity with significant decrease in the surrounding right frontal lobe vasogenic edema seen on the previous study. Mass effect has essentially resolved in the interim and there is no longer any midline shift. In the interim, slightly increased thickness of a rind of peripheral nodular enhancement along the posterior inferior and medial aspects of the right frontal lobe resection cavity, indeterminate. There  is no significant elevated cerebral blood volume in this area. The findings may represent evolving posttreatment changes; however, residual tumor is not excluded.  Stable postoperative changes status post left anterior parietal craniotomy with irregular enhancement in the left frontal lobe parenchyma underlying the resection cavity, likely due to posttreatment change. Other scattered supratentorial and infratentorial metastatic lesions are overall similar to slightly decreased in size, as described.    12/5/23- PET/CT- with sole site of disease in the RLL measuring 1.6 x 1.4 cm and with SUV max of 4.2. No other sites of disease. His case was reviewed at tumor board and he met with Dr. Fu 12/14/24 with recommendations against surgical resection due to risk of significant pleural scarring. Continued on brigatinib 120 mg daily.     1/29/24 Brain MRI: No new metastatic lesions. No significant change in supratentorial and infratentorial subcentimeter metastatic enhancing lesions. Stable right inferior frontal and left high frontal postsurgical changes.     12/22-current: lost to follow up due to insurance issues    3/15/24- CT chest- Stable nodule medial right lower lobe. No metastatic disease demonstrated.    6/21/24- CT CAP- stable    6/26/24- Brain MRI- Numerous new and increased size of intracranial metastatic lesions. Increased nodular enhancement about the medial aspect of the right frontal lobe resection margin with increased adjacent T2/FLAIR hyperintense signal although without elevated cerebral blood volume could represent posttreatment changes although disease progression could have a similar appearance.  Stable left frontal lobe resection changes with stable underlying curvilinear enhancement.      Interval Hx:    Seeing Connor on video today  No new symptoms since last visit  Continues to feel ok, He feels a little sluggish,   Notes he falls asleep around 5:30pm in the evenings and sleeps through the  night; states he does no feel he gets adequate rest despite getting significant amounts of sleep.  he started ritalin but has not made much difference  Working 8 hrs wears him out, makes him super tired  The right sided pain is now controlled, taking methadone and oxycodone prn, takes at least oxy 6/day  No new headaches, notes some new vision changes (things are not clear), no filed cuts  Appetite is suppressed, eats only 1/day, weight si stable  Increased cravings for sweets; states he will go periods without eating due to decreased appetite and then craves sugary foods. His wife has tried keeping sugary foods out of the house.  Does not frequently check blood sugars at home, working with new PCP for insulin   Would like to start drinking protein shakes with meals daily  Recently spent time in [place] with his father which he enjoyed    ECOG PS 0-1    REVIEW OF SYSTEMS: 14 point ROS negative other than the symptoms noted above in the HPI.    Wt Readings from Last 4 Encounters:   06/26/24 99.2 kg (218 lb 11.2 oz)   06/19/24 98.5 kg (217 lb 1.6 oz)   06/06/24 97.1 kg (214 lb)   06/05/24 98.3 kg (216 lb 11.2 oz)      Review of Systems:  A comprehensive ROS was performed and found to be negative or non-contributory with the exception of that noted in the HPI above.    Past Medical History:  GERD  Hypertension, not on medication  Type 2 diabetes mellitus, not on medications currently, previously on Metformin    Past Surgical History:  Past Surgical History:   Procedure Laterality Date     BRONCHOSCOPY RIGID OR FLEXIBLE W/TRANSENDOSCOPIC ENDOBRONCHIAL ULTRASOUND GUIDED Bilateral 1/26/2022    Procedure: Right BRONCHOSCOPY, FIBEROPTIC, endobronchial ultrasound, pleural biopsy;  Surgeon: Dallin Agrawal MD;  Location: [facility] OR     INJECT BLOCK MEDIAL BRANCH CERVICAL/THORACIC/LUMBAR       INSERT CHEST TUBE Right 2/16/2022    Procedure: INSERTION, CATHETER, INTERCOSTAL, FOR DRAINAGE;  Surgeon: Dallin Agrawal MD;  Location:  [facility] GI     INSERT CHEST TUBE Right 3/9/2022    Procedure: INSERTION, CATHETER, INTERCOSTAL, FOR DRAINAGE;  Surgeon: Sushila Antonio MD;  Location:  GI     IR CHEST TUBE REMOVAL TUNNELED RIGHT  2022     OPTICAL TRACKING SYSTEM CRANIOTOMY, EXCISE TUMOR, COMBINED Left 2022    Procedure: Left stealth craniotomy for tumor resection with motor mapping;  Surgeon: Stephen Moran MD;  Location:  OR     OPTICAL TRACKING SYSTEM CRANIOTOMY, EXCISE TUMOR, COMBINED Right 2023    Procedure: Right stealth craniotomy and resection of tumor;  Surgeon: Stephen Moran MD;  Location:  OR     ORTHOPEDIC SURGERY      Ganesh. Rotator cuff repair.     PLEUROSCOPY N/A 2022    Procedure: Pleuroscopy with Pleural Biopsy;  Surgeon: Dallin Agrawal MD;  Location:  OR       Social History:  Lives with wife and 4 kids in [place]. Works as a  for an apartment complex in [place]. Exposure to household chemicals and . No significant exposure to asbestos. No signal exposure to benzene or similar chemicals. No significant smoking history-states that he smoked 1 to 2 cigarettes occasionally per month for about 2 years in college, non-smoking since then. No significant alcohol use history. No other recreational substances. Good support system. Kids are 23, 19, 17 and 13.    Family History  Significant history for cancers on maternal side. Mother  of uterine cancer. 2 maternal uncles have possible metastatic melanoma.    Outpatient Medications:  Current Outpatient Medications   Medication Sig Dispense Refill     acetaminophen (TYLENOL) 325 MG tablet Take 325-650 mg by mouth every 6 hours as needed for mild pain       albuterol (PROAIR HFA/PROVENTIL HFA/VENTOLIN HFA) 108 (90 Base) MCG/ACT inhaler Inhale 2 puffs into the lungs every 6 hours as needed for shortness of breath, wheezing or cough 18 g 0     Alcohol Swabs PADS Use to swab the area of the injection or jabier as directed. Per  insurance coverage 100 each 0     baclofen (LIORESAL) 10 MG tablet Take 0.5-1 tablets (5-10 mg) by mouth 3 times daily as needed for other (hiccups) 60 tablet 4     blood glucose (NO BRAND SPECIFIED) lancets standard To use to test glucose level in the blood Use to test blood sugar  4  times daily as directed. To accompany glucose monitor brands per insurance coverage. 100 each 3     blood glucose (NO BRAND SPECIFIED) test strip To use to test glucose level in the blood Use to test blood sugar  4 times daily as directed. To accompany glucose monitor brands per insurance coverage. 100 strip 3     Blood Glucose Monitoring Suppl (ACCU-CHEK GUIDE) w/Device KIT        brigatinib (ALUNBRIG) 30 MG TABS tablet Take 4 tablets (120 mg) by mouth daily May be taken with or without food. Swallow whole. Do not crush or chew tablets. 120 tablet 2     DULoxetine (CYMBALTA) 30 MG capsule Take 1 capsule (30 mg) by mouth 2 times daily 60 capsule 2     FLUoxetine (PROZAC) 20 MG capsule Take 1 capsule (20 mg) by mouth daily 30 capsule 1     hydrALAZINE (APRESOLINE) 50 MG tablet Take 1 tablet (50 mg) by mouth 2 times daily 180 tablet 3     hydrochlorothiazide (HYDRODIURIL) 50 MG tablet Take 1 tablet (50 mg) by mouth daily 30 tablet 1     insulin aspart (NOVOLOG PEN) 100 UNIT/ML pen Inject 0-40 Units Subcutaneous 3 times daily (before meals) Per discharge instructions sliding scale 45 mL 2     insulin glargine (LANTUS PEN) 100 UNIT/ML pen Inject 70 Units Subcutaneous every morning 15 mL 4     insulin pen needle (32G X 4 MM) 32G X 4 MM miscellaneous Use as directed by provider. Per insurance coverage 100 each 0     levETIRAcetam (KEPPRA) 1000 MG tablet Take 1 tablet (1,000 mg) by mouth 2 times daily 60 tablet 11     lisinopril (ZESTRIL) 40 MG tablet Take 1 tablet (40 mg) by mouth daily 90 tablet 1     methadone (DOLOPHINE) 5 MG tablet Take 1.5 tablets (7.5 mg) by mouth 2 times daily 90 tablet 0     methylphenidate (RITALIN) 5 MG tablet  Take 1-2 tablets (5-10 mg) by mouth 2 times daily as needed (fatigue) Take second dose by 12 noon, if it is needed 90 tablet 0     naloxone (NARCAN) 4 MG/0.1ML nasal spray Spray 1 spray (4 mg) into one nostril alternating nostrils as needed for opioid reversal every 2-3 minutes until assistance arrives 0.2 mL 3     oxyCODONE IR (ROXICODONE) 10 MG tablet Take 1 tablet (10 mg) by mouth every 3 hours as needed for moderate pain 120 tablet 0     propranolol (INDERAL) 20 MG tablet Take 2 tablets (40 mg) by mouth 3 times daily 180 tablet 1     rivaroxaban ANTICOAGULANT (XARELTO) 20 MG TABS tablet Take 1 tablet (20 mg) by mouth daily (with dinner) 90 tablet 3     No current facility-administered medications for this visit.     There were no vitals taken for this visit.  General: No acute distress, no rashes on skin. Tenderness to right upper flank with deep palpation. No erythema or obvious masses visualized. Tissue density palpable, but is somewhat mobile and soft.  HEENT: Sclera anicteric. Oral mucosa pink and moist.  No mucositis or thrush  Heart: Regular, rate, and rhythm  Lungs: Clear to ascultation bilaterally. Breathing comfortably  Abdomen: Positive bowel sounds. Soft, non-distended, non-tender.   Extremities: no lower extremity edema  Neuro: Cranial nerves grossly intact      Labs & Studies: I personally reviewed the following studies:  Most Recent 3 CBC's:  Recent Labs   Lab Test 06/19/24  1056 03/20/24  1632 12/28/23  0755   WBC 9.5 11.8* 11.5*   HGB 14.9 15.0 16.3   MCV 90 90 91    252 214     Most Recent 3 BMP's:  Recent Labs   Lab Test 06/19/24  1056 06/05/24  1347 03/20/24  1632    140 142   POTASSIUM 4.6 4.0 3.9   CHLORIDE 103 104 103   CO2 27 25 27   BUN 20.5* 14.8 18.5   CR 0.74 0.67 0.71   ANIONGAP 10 11 12   TORY 9.8 9.2 9.3   * 292*  292* 175*    Most Recent 2 LFT's:  Recent Labs   Lab Test 06/19/24  1056 06/05/24  1347   AST 15 18   ALT 18 16   ALKPHOS 111 107   BILITOTAL 0.5 0.7     Most Recent TSH and T4:  Recent Labs   Lab Test 09/12/22  1642   TSH 2.56        ASSESSMENT AND PLAN:  Stage IV NSCLC, Rt lung adenocarcinoma with metastasis to pleura, mediastinum , rt pleural effusion and brain diagnosed 1/2022 (AJCC 8th edition)  PD-L1 TPS 2-3% by [place]   NGS [facility] panel-EML4:ALK rearragement; chr2:[id number], chr2:[id number]  NGS Guardant- GNAS R201H, KRAS K5E- No ALK    He began Alectinib 600 mg BID 3/2/22 and unfortunately developed grade 3 myalgias  requiring dose reduction to 300 mg BID.   In Dec 2022,we stopped drug 12/20/22 due to unremitting arthalgias, eventully had to starte PO steroids which led to improvement and resolved. Radiographicaly, he has had a near CR to Rx in the lung, has a residual rt LL lesion.     Began brigatinib 2/22/23 (delayed due to pt hesitancy). Developed severe cough on day 2 so brigatinib was held and cough resolved with in 24-48 hours. He restarted 60 mg daily and upped it to 90 mg.Restging CT showing stable disease in the lung, however, MRI with several contrast enhancement and increase in size of previously treated lesions. His dose was increased to 180 mg daily to address possible CNS disease progression and started on dexamethasone. 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 Restaging CT in 9/2023 showing overall disease stablity with no evidence of active cancer. 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  PET/CT after these three months showed oligoperisstent disease with a single focus of active malignancy in the RLL. Met with Thoracic surgery 12/2023 to discuss resection but they recommended against it due to risk for scarring after. 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 MOst recent CT showing stable  disease but brain MRI showing several possible new lesions and enlargement of exissting lesion (see below).     Today we reviewed the plan to switch Rx to Lorlatinib, discussed potential AE including neuropsychiatric, weight gain, hyperlipidemia and some of the other SE. This is also very effective in CNS control. He does have an appt coming up with Rad Onc. He did have moderate radiation necrosis requiring steroids and bevacizumab. One alternative is to start lorlatinib and do short term MRI to make sure disease in under control and defer gamma knife later. I will discuss this with Rad Onc. Before Rx we will get cholestrol panel and EKG. Plan to start liorlatinib as soon as possible. We will set up a follow up in 2 weeks following starting lorlatinib for tox check.  Will do next CT in 3 months, MRI brain depending on timing of GK.    Plan  Labs and EKG before starting lorlatinib  RTC with NP/PA in 3 weeks for tox check      # Brain mets:   # Radiation necrosis,   -Baseline Brain MRI with several brain mets, s/p GK to 11-12 brain mets. F/u Brain MRI in June 2022 was showing enlargement of the one of the lesions along with edema, therefore had to undergo craniotomy followed by resection, the final biopsy consistent with radiation necrosis.   -received two doses bevacizumab for radiation necrosis in Fall 2022, tolerated poorly with HTN urgency and PE.)   -MRI in 4/2023 now showing contrast enhancement of lesions and a dominate lesion in the R frontal region with edema, several other smaller lesion. Short term MRI showing increase in size of the rt frontal lesion, underwent resection, patho again showing radiation necrosis. Restarted bevacizumab, which resulted in improved radiation necrosis / vasogenic edema on imaging in 10/2023 but ultimately was stopped due to uncontrolled HTN, last dose being 11/2023  -MRI imaging 1/2024 with stable disease and no edema.  -Brain MRI 3/2024 cancelled due to lapse in medical  insurance.  -Most recent MRI 6/2024 showing showing several possible new lesions and enlargement of exissting lesion, however I am unsure if this is due to better imaging (perfusion MRI this time).Reviewed this Dr. Osborn and Dr. Arango, many new lesions but are small and amenable for GK, he is seign Rad onc in 2 weeks. Remi discuss short term MRI scan and lorlatinib.      #Right pleuritic/chest pain  Felt to be 2/2 prior pleurex drain. No acute resp changes today. Pain now improving on increased dose of methadone and prn oxy  -On methadone and oxycodone; followed by Palliative Care a     #Elevated Lipase  -mild elevation. No concern on exam    #Diabetes, Type 2  Hyperglycemia on labs.On insulin but not very adherent to diet. Discussed dietary recommendations as it seems he has been eating a lot of sugary foods lately.   Etablsihed with PCP 6/19 who is now managing     #PE: provoked by malignancy vs bevacizumab in 11/2022  -- on rivaroxiabn 20 mg daily    #Grade 2-3 arthralgias    All joints affected, no morning stiffness, normal ESR and CRP  -better on brigatinib vs alectinib.  -stable at this time with pain mgmt    #HTN: elevated today but much improved since HTN urgency levels while he was on Fe.   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 Due to significant time spent with medication management, will monitor at this time. Recheck 6/12 as scheduled      On the day of service  Chart review: 5 minutes  Visit duration: 30 minutes  Care coordination: 5 minutes    Bassam Persaud MD    Hematology, Oncology and Transplantation               Again, thank you for allowing me to participate in the care of your patient.        Sincerely,        Bassam Persaud MD

## 2024-07-08 NOTE — TELEPHONE ENCOUNTER
Received [employer] message from patient requesting refill of oxycodone.     Last refill: 6/28/24  Last office visit: 6/6/24  Scheduled for follow up 8/1/24     Will route request to MD for review.     Reviewed [place]  Report.

## 2024-07-08 NOTE — PROGRESS NOTES
Virtual Visit Details    Type of service:  Video Visit   Video Start Time: 4:25 PM  Video End Time:4:25 PM    Originating Location (pt. Location): Home    Distant Location (provider location):  On-site  Platform used for Video Visit: [facility] ONCOLOGY FOLLOW UP NOTE    PATIENT NAME: Connor Emerson  ENCOUNTER DATE:  July 8, 2024      Care Team  Primary Oncologist: Bassam Persaud MD    REASON FOR CURRENT VISIT: F/u of lung cancer    HISTORY OF PRESENT ILLNESS:  MrBailee Emerson is a 44 year old  male who is a non-smoker with PMHx of T2DM, HTN with metastatic NSCLC comes for follow up     Oncologic Hx:    Diagnosis:   Stage IV NSCLC, Rt lung adenocarcinoma with metastasis to pleura, mediastinum , rt pleural effusion and brain diagnosed 1/2022 (AJCC 8th edition)  PD-L1 TPS 2-3% by [place]   NGS [facility] panel-EML4:ALK rearragement  NGS Guardant- GNAS R201H, KRAS K5E- No ALK    Treatment:   2/23/2022- current: Brigatinib. Dose reduced to 60 mg due to cough after two days of 90 mg daily -->back on 90 mg---> increased to 180 mg  ---> settled on 120 mg    6/27/23- Right stealth craniotomy and resection of tumor:     7/20/23- 11/14/24: Bevacizumab for radiation necrosis. Discontinued due to severe HTN.    Past:  2/15/22- GK to 11 brain lesions  3/2/22- 12/2022 Alectinib 300 mg BID (Dose reduced to 450 mg BID from 600 mg BID due to grade 3 myalgias 3/21/22, again reduced to 300 mg BID 9/28/22)  6/28/22- Craniotomy, resection  9/28/22-10/26- Bevacizumab for radiation necrosis (stopped due to PE)      Intent of treatment: Palliative    Oncologic course:  1/19/22 to 1/22/22-Admitted to [facility] for 2 week progressive SOB secondary to have large rt sided pleural effusion, needing thoracentesis x2 (1.7L and 2.0 L removed), cytology positive for malignancy, adenocarcinoma.   1/26/22- Rt pleural mass biopsy-Dr. Agrawal--POSITIVE FOR ADENOCARCINOMA CONSISTENT WITH LUNG PRIMARY, admixed with mesothelial hyperplasia and  inflammatory infiltrate (+ TTF-1 and CK 7;  negative  p40, calretinin and WT-1. PAX8 immunostain focal +). 4th thoracentesis done simultaneously - 3L approx removed.   2/1/22- PET/CT-Right lower lobe central infiltrative FDG avid 8.2 x 9.6 cm mass representing a primary lung adenocarcinoma. Ipsilateral right perihilar, bilateral pretracheal, subcarinal and superior mediastinal michele metastases. Contralateral mildly FDG avid few lung nodules are suspicious for contralateral metastasis. At least 3 intracranial metastases in the right frontal lobe, left frontal lobe and left cerebellar hemisphere. Nonspecific mild diffuse bone marrow uptake. Further evaluation with a spine MRI could be considered to rule out early marrow infiltration. This could also be seen with red marrow conversion.  2/5/22-  Brain MRI- At least 9 intracranial metastases as detailed above. The dominant lesions involving the orbital right frontal lobe, the posterior left middle frontal gyrus, anterior right temporal lobe and in the left cerebellar hemisphere have surrounding moderate vasogenic type edema.  2/15/22- Saw Dr. Arango from Rad Onc- Rcd GK to 12 lesion in bran  2/16/22- Pleurex placement   3/2/22- Started Alectinib 600 mg BID  3/21/22- Dose reduced to 450 mg BID due to grade 3 myalgias and fatigue  4/2/22 to 4/5/22- Admitted at [facility] for- Severe sepsis due to MSSA infection of right PleurX catheter s/p removal- He presented with onset of pain at tube site starting 4/1; at arrival was tachycardic with leukocytosis (22.7) and elevated lactic acid (2.9).  CT chest showed fluid and stranding tracking outside the pleural space into chest wall along pleural catheter.  IR was consulted and removed catheter 4/2 with report of pustular drainage and tip culture growing MSSA.  Thoracic Surg was consulted who felt no surgical indication necessary given minimal pleural fluid and lack of any signs of abscess.  Initially treated with broad spectrum  coverage for sepsis, narrowed to Ancef once sensitivities returned with plan to transition to cefadroxil for an additional 10 days at discharge per ID. Held drug 4/2 to 4/11 5/2/22- CT CAP- Overall, positive response to therapy with decreased size of right lower lobe and right pleural-based masses, pulmonary metastases, hilar and mediastinal lymphadenopathy. However, a single right posterior pleural-based mass has slightly increased in size since 2/24/2022. No metastatic disease in the abdomen and pelvis. Right Pleurx catheter has been removed. Trace right pleural effusion and right basilar atelectasis.  5/2/22- Brain MRI- The previously demonstrated brain metastases are mildly diminished in size versus to 2/5/2022. The degree of edema is also diminished but not completely resolved. Probable trace amounts of intralesional bleeding demonstrated on the gradient sequence within the metastases. No definite new metastasis or progressive mass effect. No hydrocephalus or infarct.    6/15/22 to 6/17/22- Admitted at [facility]-with aphasia and word finding difficulty over last few weeks.  He presented to [facility] ED on 6/10 for evaluation of his symptoms. MRI brain showed multiple intracranial metastases, with interval enlargement of the dominant lesion within the left frontal lobe and increased surrounding vasogenic edema with 2 mm rightward shift of the septum pellucidum. Due to his worsening anxiety, he left AMA. His symptoms continued to progress to where he could not write at work so he decided to go to the ED for re-evaluation and treatment. Evaluated by JATINDER, Rad Onc (radiaiton necrosis vs tumor progression).  6/16/22- MR Brain (6/16) shows multiple intracranial metastases, with interval enlargement  of the dominant lesion within the left frontal lobe and increased surrounding vasogenic edema with 2 mm rightward shift of the septum pellucidum.  6/16/22- - CT CAP shows slightly decreased size of right lower lobe and right  pleural-based masses. No new pulmonary nodules or lymphadenopathy; No evidence of metastatic disease in the abdomen or pelvis.   6/28/22 to 6/30/22- Admitted at [facility]- Elective left Stealth craniotomy with resection of brain tumor due to ongoing symptoms. No intraoperative complications. EBL 50 ml.  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  7/5/22- Ct CAP- Right lower lobe low-density nodules are not significantly changed. A small left upper lobe pulmonary nodule is also unchanged. Trace pleural fluid on the right has increased slightly. No convincing evidence for metastatic disease in the abdomen or pelvis.  7/18/22 to 7/19/22- Admitted to [facility] for seizure- Reportedly was only taking once Keppra instead of twice daily. Also resumed on dexamethasone 2 mg daily  8/1/22- Brain MRI- Redemonstrated postsurgical changes status post left frontoparietal Craniotomy. Interval increase in size of the dominant ring-enhancing lesion in the left posterior superior frontal lobe with increased moderate surrounding vasogenic edema and local mass effect resulting in narrowing of the supratentorial ventricular system. No significant midline shift/herniation at this time    8/1/22- Dex was increased to 4 mg daily by Dr. Moran    9/1/22- CT Chest- Near resolution of previously seen right pleural nodule. Stable right lower lobe pulmonary nodule    9/28/22- Bevacizumab for radiation necrosis    10/26/22- Bevacizumab     10/26/22- Ct CAP- Stable posterior medial right lower lobe 1.9 x 1.1 cm nodule series 8 image 176. Adjacent stable scarring and atelectasis. The previously noted pleural nodule posteriorly on the right is not currently clearly identified. Stable left upper lobe 0.3 cm nodule image 56    10/26/22- Brain MRI- Overall improved appearance of multiple intracranial metastases with near resolution of associated edema and diminished enhancement and size of multiple residual lesions. No definite new or progressive  metastasis.    11/7/22 to 11/9/22- Admitted for PE and HTN urgency- Small pulmonary embolism in the right lower lobe pulmonary artery. started on Lovenox, Brain MRI neg for PRES.    12/29/22- ED visit- bilateral hip pain, pain in shoulders, knuckles, knees, and ankles- holding alectinib since 12/20/22 1/6/23- Ct CAP- Mild groundglass nodularity in the left upper lobe is new since the previous exam, and may be infectious in etiology. No other significant interval change. Pulmonary nodules are not significantly changed.    1/6/23- Brain MRI- Stable to diminished sequelae of intracranial metastasis and treatment changes. No new or progressive metastasis. No superimposed acute intracranial finding.     2/23/2022- Start Brigatinib. Dose reduced to 60 mg due to cough after two days of 90 mg daily -  3/23/23-Brigatinib  (increase to 90 mg)    4/20/23- CT CAP- Stable- Previously noted mild groundglass nodularity in the left upper lobe has resolved.Pulmonary nodules are unchanged.Trace amount pleural fluid on the right has decreased slightly.    4/20/23- Brain MRI- Possile progression- Largest metastasis within the right frontal lobe has increased in size with worsening peripheral nodular enhancement and worsening vasogenic edema contributing to new right to left midline shift.  Multiple new/enlarging metastases scattered throughout the cerebral hemispheres and cerebellum. Started on dexamethasone by NGS on 4/28/23 5/17/23- presents to clinic with glucose 627, admission to hospital for stability on insulin drip    6/16/23- Brain MRI- Interval increase in size of the necrotic lesion in the anterior aspect of the right frontal lobe from 2.4 x 2.3 x 2.4 cm previously to 3.0 x 3.5 x 2.8 cm on the current study. Mass effect due to slightly increased vasogenic edema surrounding the right frontal lesion resulted in increase of leftward midline shift of the anterior interhemispheric fissure from 0.7 cm previously to 0.9 cm  currently. Interval increase in size in two adjacent metastases at the frontoparietal junction on the left. The remaining scattered intra-axial enhancing nodules are not changed appreciably in size or appearance since the comparison study.No evidence for acute intracranial pathology.   Dr. Moran- Due to worsening headaches and more problems with walking. Recommended a right Stealth craniotomy for resection of the lesion.     6/27/23- Right stealth craniotomy and resection of tumor: Final path: radiation necrosis    7/10/23- Ct CAP- stable Stable exam without evidence for new disease.Stable pulmonary nodules including a dominant nodular opacity at the right lower lobe.    7/20/23- Bevacizumab for radiation necrosis    8/16/23- Bevacizumab     9/12/23- Ct CAP-  Stable right lower lobe dominant nodular opacity. No new disease in the chest, abdomen and pelvis.     9/15, 10/6, 10/27, 11/17-  Bevacizumab    10/25/23- MRI Brain- 1. Redemonstrated postoperative changes of right frontal craniotomy. Decreased size of the right frontal resection cavity with significant decrease in the surrounding right frontal lobe vasogenic edema seen on the previous study. Mass effect has essentially resolved in the interim and there is no longer any midline shift. In the interim, slightly increased thickness of a rind of peripheral nodular enhancement along the posterior inferior and medial aspects of the right frontal lobe resection cavity, indeterminate. There is no significant elevated cerebral blood volume in this area. The findings may represent evolving posttreatment changes; however, residual tumor is not excluded.  Stable postoperative changes status post left anterior parietal craniotomy with irregular enhancement in the left frontal lobe parenchyma underlying the resection cavity, likely due to posttreatment change. Other scattered supratentorial and infratentorial metastatic lesions are overall similar to slightly decreased in  size, as described.    12/5/23- PET/CT- with sole site of disease in the RLL measuring 1.6 x 1.4 cm and with SUV max of 4.2. No other sites of disease. His case was reviewed at tumor board and he met with Dr. Fu 12/14/24 with recommendations against surgical resection due to risk of significant pleural scarring. Continued on brigatinib 120 mg daily.     1/29/24 Brain MRI: No new metastatic lesions. No significant change in supratentorial and infratentorial subcentimeter metastatic enhancing lesions. Stable right inferior frontal and left high frontal postsurgical changes.     12/22-current: lost to follow up due to insurance issues    3/15/24- CT chest- Stable nodule medial right lower lobe. No metastatic disease demonstrated.    6/21/24- CT CAP- stable    6/26/24- Brain MRI- Numerous new and increased size of intracranial metastatic lesions. Increased nodular enhancement about the medial aspect of the right frontal lobe resection margin with increased adjacent T2/FLAIR hyperintense signal although without elevated cerebral blood volume could represent posttreatment changes although disease progression could have a similar appearance.  Stable left frontal lobe resection changes with stable underlying curvilinear enhancement.      Interval Hx:    Seeing Connor on video today  No new symptoms since last visit  Continues to feel ok, He feels a little sluggish,   Notes he falls asleep around 5:30pm in the evenings and sleeps through the night; states he does no feel he gets adequate rest despite getting significant amounts of sleep.  he started ritalin but has not made much difference  Working 8 hrs wears him out, makes him super tired  The right sided pain is now controlled, taking methadone and oxycodone prn, takes at least oxy 6/day  No new headaches, notes some new vision changes (things are not clear), no filed cuts  Appetite is suppressed, eats only 1/day, weight si stable  Increased cravings for sweets;  states he will go periods without eating due to decreased appetite and then craves sugary foods. His wife has tried keeping sugary foods out of the house.  Does not frequently check blood sugars at home, working with new PCP for insulin   Would like to start drinking protein shakes with meals daily  Recently spent time in [place] with his father which he enjoyed    ECOG PS 0-1    REVIEW OF SYSTEMS: 14 point ROS negative other than the symptoms noted above in the HPI.    Wt Readings from Last 4 Encounters:   06/26/24 99.2 kg (218 lb 11.2 oz)   06/19/24 98.5 kg (217 lb 1.6 oz)   06/06/24 97.1 kg (214 lb)   06/05/24 98.3 kg (216 lb 11.2 oz)      Review of Systems:  A comprehensive ROS was performed and found to be negative or non-contributory with the exception of that noted in the HPI above.    Past Medical History:  GERD  Hypertension, not on medication  Type 2 diabetes mellitus, not on medications currently, previously on Metformin    Past Surgical History:  Past Surgical History:   Procedure Laterality Date    BRONCHOSCOPY RIGID OR FLEXIBLE W/TRANSENDOSCOPIC ENDOBRONCHIAL ULTRASOUND GUIDED Bilateral 1/26/2022    Procedure: Right BRONCHOSCOPY, FIBEROPTIC, endobronchial ultrasound, pleural biopsy;  Surgeon: Dallin Agrawal MD;  Location: [facility] OR    INJECT BLOCK MEDIAL BRANCH CERVICAL/THORACIC/LUMBAR      INSERT CHEST TUBE Right 2/16/2022    Procedure: INSERTION, CATHETER, INTERCOSTAL, FOR DRAINAGE;  Surgeon: Dallin Agrawal MD;  Location: [facility] GI    INSERT CHEST TUBE Right 3/9/2022    Procedure: INSERTION, CATHETER, INTERCOSTAL, FOR DRAINAGE;  Surgeon: Sushila Antonio MD;  Location: [facility] GI    IR CHEST TUBE REMOVAL TUNNELED RIGHT  4/2/2022    OPTICAL TRACKING SYSTEM CRANIOTOMY, EXCISE TUMOR, COMBINED Left 6/28/2022    Procedure: Left stealth craniotomy for tumor resection with motor mapping;  Surgeon: Stephen Moran MD;  Location:  OR    OPTICAL TRACKING SYSTEM CRANIOTOMY, EXCISE TUMOR, COMBINED Right 6/27/2023     Procedure: Right stealth craniotomy and resection of tumor;  Surgeon: Stephen Moran MD;  Location:  OR    ORTHOPEDIC SURGERY      Ganesh. Rotator cuff repair.    PLEUROSCOPY N/A 2022    Procedure: Pleuroscopy with Pleural Biopsy;  Surgeon: Dallin Agrawal MD;  Location:  OR       Social History:  Lives with wife and 4 kids in [place]. Works as a  for an apartment complex in [place]. Exposure to household chemicals and . No significant exposure to asbestos. No signal exposure to benzene or similar chemicals. No significant smoking history-states that he smoked 1 to 2 cigarettes occasionally per month for about 2 years in college, non-smoking since then. No significant alcohol use history. No other recreational substances. Good support system. Kids are 23, 19, 17 and 13.    Family History  Significant history for cancers on maternal side. Mother  of uterine cancer. 2 maternal uncles have possible metastatic melanoma.    Outpatient Medications:  Current Outpatient Medications   Medication Sig Dispense Refill    acetaminophen (TYLENOL) 325 MG tablet Take 325-650 mg by mouth every 6 hours as needed for mild pain      albuterol (PROAIR HFA/PROVENTIL HFA/VENTOLIN HFA) 108 (90 Base) MCG/ACT inhaler Inhale 2 puffs into the lungs every 6 hours as needed for shortness of breath, wheezing or cough 18 g 0    Alcohol Swabs PADS Use to swab the area of the injection or jabier as directed. Per insurance coverage 100 each 0    baclofen (LIORESAL) 10 MG tablet Take 0.5-1 tablets (5-10 mg) by mouth 3 times daily as needed for other (hiccups) 60 tablet 4    blood glucose (NO BRAND SPECIFIED) lancets standard To use to test glucose level in the blood Use to test blood sugar  4  times daily as directed. To accompany glucose monitor brands per insurance coverage. 100 each 3    blood glucose (NO BRAND SPECIFIED) test strip To use to test glucose level in the blood Use to test blood  sugar  4 times daily as directed. To accompany glucose monitor brands per insurance coverage. 100 strip 3    Blood Glucose Monitoring Suppl (ACCU-CHEK GUIDE) w/Device KIT       brigatinib (ALUNBRIG) 30 MG TABS tablet Take 4 tablets (120 mg) by mouth daily May be taken with or without food. Swallow whole. Do not crush or chew tablets. 120 tablet 2    DULoxetine (CYMBALTA) 30 MG capsule Take 1 capsule (30 mg) by mouth 2 times daily 60 capsule 2    FLUoxetine (PROZAC) 20 MG capsule Take 1 capsule (20 mg) by mouth daily 30 capsule 1    hydrALAZINE (APRESOLINE) 50 MG tablet Take 1 tablet (50 mg) by mouth 2 times daily 180 tablet 3    hydrochlorothiazide (HYDRODIURIL) 50 MG tablet Take 1 tablet (50 mg) by mouth daily 30 tablet 1    insulin aspart (NOVOLOG PEN) 100 UNIT/ML pen Inject 0-40 Units Subcutaneous 3 times daily (before meals) Per discharge instructions sliding scale 45 mL 2    insulin glargine (LANTUS PEN) 100 UNIT/ML pen Inject 70 Units Subcutaneous every morning 15 mL 4    insulin pen needle (32G X 4 MM) 32G X 4 MM miscellaneous Use as directed by provider. Per insurance coverage 100 each 0    levETIRAcetam (KEPPRA) 1000 MG tablet Take 1 tablet (1,000 mg) by mouth 2 times daily 60 tablet 11    lisinopril (ZESTRIL) 40 MG tablet Take 1 tablet (40 mg) by mouth daily 90 tablet 1    methadone (DOLOPHINE) 5 MG tablet Take 1.5 tablets (7.5 mg) by mouth 2 times daily 90 tablet 0    methylphenidate (RITALIN) 5 MG tablet Take 1-2 tablets (5-10 mg) by mouth 2 times daily as needed (fatigue) Take second dose by 12 noon, if it is needed 90 tablet 0    naloxone (NARCAN) 4 MG/0.1ML nasal spray Spray 1 spray (4 mg) into one nostril alternating nostrils as needed for opioid reversal every 2-3 minutes until assistance arrives 0.2 mL 3    oxyCODONE IR (ROXICODONE) 10 MG tablet Take 1 tablet (10 mg) by mouth every 3 hours as needed for moderate pain 120 tablet 0    propranolol (INDERAL) 20 MG tablet Take 2 tablets (40 mg) by mouth  3 times daily 180 tablet 1    rivaroxaban ANTICOAGULANT (XARELTO) 20 MG TABS tablet Take 1 tablet (20 mg) by mouth daily (with dinner) 90 tablet 3     No current facility-administered medications for this visit.     There were no vitals taken for this visit.  General: No acute distress, no rashes on skin. Tenderness to right upper flank with deep palpation. No erythema or obvious masses visualized. Tissue density palpable, but is somewhat mobile and soft.  HEENT: Sclera anicteric. Oral mucosa pink and moist.  No mucositis or thrush  Heart: Regular, rate, and rhythm  Lungs: Clear to ascultation bilaterally. Breathing comfortably  Abdomen: Positive bowel sounds. Soft, non-distended, non-tender.   Extremities: no lower extremity edema  Neuro: Cranial nerves grossly intact      Labs & Studies: I personally reviewed the following studies:  Most Recent 3 CBC's:  Recent Labs   Lab Test 06/19/24  1056 03/20/24  1632 12/28/23  0755   WBC 9.5 11.8* 11.5*   HGB 14.9 15.0 16.3   MCV 90 90 91    252 214     Most Recent 3 BMP's:  Recent Labs   Lab Test 06/19/24  1056 06/05/24  1347 03/20/24  1632    140 142   POTASSIUM 4.6 4.0 3.9   CHLORIDE 103 104 103   CO2 27 25 27   BUN 20.5* 14.8 18.5   CR 0.74 0.67 0.71   ANIONGAP 10 11 12   TORY 9.8 9.2 9.3   * 292*  292* 175*    Most Recent 2 LFT's:  Recent Labs   Lab Test 06/19/24  1056 06/05/24  1347   AST 15 18   ALT 18 16   ALKPHOS 111 107   BILITOTAL 0.5 0.7    Most Recent TSH and T4:  Recent Labs   Lab Test 09/12/22  1642   TSH 2.56        ASSESSMENT AND PLAN:  Stage IV NSCLC, Rt lung adenocarcinoma with metastasis to pleura, mediastinum , rt pleural effusion and brain diagnosed 1/2022 (AJCC 8th edition)  PD-L1 TPS 2-3% by [place]   NGS [facility] panel-EML4:ALK rearragement; chr2:[id number], chr2:[id number]  NGS Guardant- GNAS R201H, KRAS K5E- No ALK    He began Alectinib 600 mg BID 3/2/22 and unfortunately developed grade 3 myalgias  requiring dose reduction to 300  mg BID.   In Dec 2022,we stopped drug 12/20/22 due to unremitting arthalgias, eventully had to starte PO steroids which led to improvement and resolved. Radiographicaly, he has had a near CR to Rx in the lung, has a residual rt LL lesion.     Began brigatinib 2/22/23 (delayed due to pt hesitancy). Developed severe cough on day 2 so brigatinib was held and cough resolved with in 24-48 hours. He restarted 60 mg daily and upped it to 90 mg.Restging CT showing stable disease in the lung, however, MRI with several contrast enhancement and increase in size of previously treated lesions. His dose was increased to 180 mg daily to address possible CNS disease progression and started on dexamethasone. 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 Restaging CT in 9/2023 showing overall disease stablity with no evidence of active cancer. 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  PET/CT after these three months showed oligoperisstent disease with a single focus of active malignancy in the RLL. Met with Thoracic surgery 12/2023 to discuss resection but they recommended against it due to risk for scarring after. 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 MOst recent CT showing stable disease but brain MRI showing several possible new lesions and enlargement of exissting lesion (see below).     Today we reviewed the plan to switch Rx to Lorlatinib, discussed potential AE including neuropsychiatric, weight gain, hyperlipidemia and some of the other SE. This is also very effective in CNS control. He does have an appt coming up with Rad Onc. He did have moderate radiation necrosis requiring steroids and bevacizumab. One alternative is to start lorlatinib and do short term MRI to make sure disease  in under control and defer gamma knife later. I will discuss this with Rad Onc. Before Rx we will get cholestrol panel and EKG. Plan to start liorlatinib as soon as possible. We will set up a follow up in 2 weeks following starting lorlatinib for tox check.  Will do next CT in 3 months, MRI brain depending on timing of GK.    Plan  Labs and EKG before starting lorlatinib  RTC with NP/PA in 3 weeks for tox check      # Brain mets:   # Radiation necrosis,   -Baseline Brain MRI with several brain mets, s/p GK to 11-12 brain mets. F/u Brain MRI in June 2022 was showing enlargement of the one of the lesions along with edema, therefore had to undergo craniotomy followed by resection, the final biopsy consistent with radiation necrosis.   -received two doses bevacizumab for radiation necrosis in Fall 2022, tolerated poorly with HTN urgency and PE.)   -MRI in 4/2023 now showing contrast enhancement of lesions and a dominate lesion in the R frontal region with edema, several other smaller lesion. Short term MRI showing increase in size of the rt frontal lesion, underwent resection, patho again showing radiation necrosis. Restarted bevacizumab, which resulted in improved radiation necrosis / vasogenic edema on imaging in 10/2023 but ultimately was stopped due to uncontrolled HTN, last dose being 11/2023  -MRI imaging 1/2024 with stable disease and no edema.  -Brain MRI 3/2024 cancelled due to lapse in medical insurance.  -Most recent MRI 6/2024 showing showing several possible new lesions and enlargement of exissting lesion, however I am unsure if this is due to better imaging (perfusion MRI this time).Reviewed this Dr. Osborn and Dr. Arango, many new lesions but are small and amenable for GK, he is seign Rad onc in 2 weeks. Remi discuss short term MRI scan and lorlatinib.      #Right pleuritic/chest pain  Felt to be 2/2 prior pleurex drain. No acute resp changes today. Pain now improving on increased dose of methadone and prn  oxy  -On methadone and oxycodone; followed by Palliative Care a     #Elevated Lipase  -mild elevation. No concern on exam    #Diabetes, Type 2  Hyperglycemia on labs.On insulin but not very adherent to diet. Discussed dietary recommendations as it seems he has been eating a lot of sugary foods lately.   Etablsihed with PCP 6/19 who is now managing     #PE: provoked by malignancy vs bevacizumab in 11/2022  -- on rivaroxiabn 20 mg daily    #Grade 2-3 arthralgias    All joints affected, no morning stiffness, normal ESR and CRP  -better on brigatinib vs alectinib.  -stable at this time with pain mgmt    #HTN: elevated today but much improved since HTN urgency levels while he was on Fe.   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 Due to significant time spent with medication management, will monitor at this time. Recheck 6/12 as scheduled      On the day of service  Chart review: 5 minutes  Visit duration: 30 minutes  Care coordination: 5 minutes    Bassam Persaud MD    Hematology, Oncology and Transplantation

## 2024-07-09 ENCOUNTER — TELEPHONE (OUTPATIENT)
Dept: ONCOLOGY | Facility: CLINIC | Age: 44
End: 2024-07-09
Payer: COMMERCIAL

## 2024-07-09 ENCOUNTER — MYC MEDICAL ADVICE (OUTPATIENT)
Dept: ONCOLOGY | Facility: CLINIC | Age: 44
End: 2024-07-09
Payer: COMMERCIAL

## 2024-07-09 DIAGNOSIS — C34.90 PRIMARY MALIGNANT NEOPLASM OF LUNG METASTATIC TO OTHER SITE, UNSPECIFIED LATERALITY (H): Primary | ICD-10-CM

## 2024-07-09 DIAGNOSIS — Z79.899 ENCOUNTER FOR LONG-TERM (CURRENT) USE OF MEDICATIONS: ICD-10-CM

## 2024-07-09 NOTE — TELEPHONE ENCOUNTER
Juliann Lopez:  Withdrew from free med program - progression        Juliette Marquez CPhT  [facility]  Oncology Pharmacy Liaison II  [email or web address]  [phone] (phone  [phone] (fax

## 2024-07-09 NOTE — TELEPHONE ENCOUNTER
Prior Authorization Approval    Medication: LORBRENA 100 MG PO TABS  Authorization Effective Date: 7/1/2024  Authorization Expiration Date: 7/9/2025  Approved Dose/Quantity: 30/30  Reference #: [id number]   Insurance Company: [employer] - Phone [phone] Fax [phone]:  [employer] [place]  Expected CoPay: $ 0  Which Pharmacy is filling the prescription: [facility] - [place], [place] - [address]   Pharmacy Notified: yes          Juliette Marquez CPhT  [facility]  Oncology Pharmacy Liaison II  [email or web address]  [phone] (phone  [phone] (fax

## 2024-07-10 ENCOUNTER — MYC MEDICAL ADVICE (OUTPATIENT)
Dept: ONCOLOGY | Facility: CLINIC | Age: 44
End: 2024-07-10
Payer: COMMERCIAL

## 2024-07-10 ENCOUNTER — LAB (OUTPATIENT)
Dept: LAB | Facility: CLINIC | Age: 44
End: 2024-07-10
Payer: COMMERCIAL

## 2024-07-10 DIAGNOSIS — C34.31 MALIGNANT NEOPLASM OF LOWER LOBE OF RIGHT LUNG (H): ICD-10-CM

## 2024-07-10 DIAGNOSIS — I67.89 RADIATION THERAPY INDUCED BRAIN NECROSIS: ICD-10-CM

## 2024-07-10 DIAGNOSIS — Z79.899 ENCOUNTER FOR LONG-TERM (CURRENT) USE OF MEDICATIONS: ICD-10-CM
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
## 2024-07-10 DIAGNOSIS — C34.90 PRIMARY MALIGNANT NEOPLASM OF LUNG METASTATIC TO OTHER SITE, UNSPECIFIED LATERALITY (H): ICD-10-CM

## 2024-07-10 LAB
BASOPHILS # BLD AUTO: 0 10E3/UL (ref 0–0.2)
BASOPHILS NFR BLD AUTO: 0 %
EOSINOPHIL # BLD AUTO: 0.4 10E3/UL (ref 0–0.7)
EOSINOPHIL NFR BLD AUTO: 2 %
ERYTHROCYTE [DISTWIDTH] IN BLOOD BY AUTOMATED COUNT: 13.5 % (ref 10–15)
HCT VFR BLD AUTO: 43.7 % (ref 40–53)
HGB BLD-MCNC: 15 G/DL (ref 13.3–17.7)
IMM GRANULOCYTES # BLD: 0 10E3/UL
IMM GRANULOCYTES NFR BLD: 0 %
LYMPHOCYTES # BLD AUTO: 2.6 10E3/UL (ref 0.8–5.3)
LYMPHOCYTES NFR BLD AUTO: 18 %
MCH RBC QN AUTO: 31 PG (ref 26.5–33)
MCHC RBC AUTO-ENTMCNC: 34.3 G/DL (ref 31.5–36.5)
MCV RBC AUTO: 90 FL (ref 78–100)
MONOCYTES # BLD AUTO: 0.8 10E3/UL (ref 0–1.3)
MONOCYTES NFR BLD AUTO: 5 %
NEUTROPHILS # BLD AUTO: 10.7 10E3/UL (ref 1.6–8.3)
NEUTROPHILS NFR BLD AUTO: 74 %
PLATELET # BLD AUTO: 260 10E3/UL (ref 150–450)
RBC # BLD AUTO: 4.84 10E6/UL (ref 4.4–5.9)
WBC # BLD AUTO: 14.5 10E3/UL (ref 4–11)

## 2024-07-10 PROCEDURE — 80053 COMPREHEN METABOLIC PANEL: CPT

## 2024-07-10 PROCEDURE — 83735 ASSAY OF MAGNESIUM: CPT

## 2024-07-10 PROCEDURE — 80061 LIPID PANEL: CPT

## 2024-07-10 PROCEDURE — 84100 ASSAY OF PHOSPHORUS: CPT

## 2024-07-10 PROCEDURE — 85025 COMPLETE CBC W/AUTO DIFF WBC: CPT

## 2024-07-10 PROCEDURE — 36415 COLL VENOUS BLD VENIPUNCTURE: CPT

## 2024-07-10 NOTE — TELEPHONE ENCOUNTER
"Nurse Triage SBAR    Situation: Syncope episodes    Background:    DX: Lung Cancer  Provider:   Most recent appointment: 07/08/24 w/  Upcoming appointments: 07/19/24 Gamma Knife consult  Recent treatment: Lorlatinib    Assessment:   Monday had first syncope episode. States he was walking out of his apartment. Pt reports it came on suddenly. His spouse was with him and helped him sit down before it occurred.    Tuesday, pt states he got a dizzy spell, started to feel faint and then \"blacked out.\" This occurred while he was at work and was witnessed by one of his coworkers. 911 was called. Pt states he did not hit his head. Pt continued to work and reports he just felt \"sluggish\" rest of the day.    Last night got home around 5pm and woke up at 9pm. He states he felt hot/warm but was unable to find thermometer to check temp. Today he is feeling fine.    Pt has not been eating very much recently due to lack of appetite. States he is probably just eating dinner most days.   Has been pushing fluids. Drinking more water and sugar free drinks.     Denies F/C, SOB, chest pain, N/V, problems with bowel/bladder    Recommendation:   Advised pt to continue to push fluids. Recommended pt eat smaller more frequent snacks/meals to increase intake.  Informed pt writer will reach out to provider and call back with recommendation. Pt verbalized understanding.     Secure message to     1526  recommending lab appt for CMP and magnesium.     1528 Call to pt and informed him of provider recommendation. Informed pt someone from scheduling should be calling to assist with lab appt scheduling. Pt verbalized understanding.     "

## 2024-07-11 LAB
ALBUMIN SERPL BCG-MCNC: 4.3 G/DL (ref 3.5–5.2)
ALP SERPL-CCNC: 96 U/L (ref 40–150)
ALT SERPL W P-5'-P-CCNC: 18 U/L (ref 0–70)
ANION GAP SERPL CALCULATED.3IONS-SCNC: 11 MMOL/L (ref 7–15)
AST SERPL W P-5'-P-CCNC: 20 U/L (ref 0–45)
BILIRUB SERPL-MCNC: 0.4 MG/DL
BUN SERPL-MCNC: 30.8 MG/DL (ref 6–20)
CALCIUM SERPL-MCNC: 9.4 MG/DL (ref 8.6–10)
CHLORIDE SERPL-SCNC: 100 MMOL/L (ref 98–107)
CHOLEST SERPL-MCNC: 201 MG/DL
CREAT SERPL-MCNC: 0.97 MG/DL (ref 0.67–1.17)
DEPRECATED HCO3 PLAS-SCNC: 29 MMOL/L (ref 22–29)
EGFRCR SERPLBLD CKD-EPI 2021: >90 ML/MIN/1.73M2
FASTING STATUS PATIENT QL REPORTED: YES
FASTING STATUS PATIENT QL REPORTED: YES
GLUCOSE SERPL-MCNC: 330 MG/DL (ref 70–99)
HDLC SERPL-MCNC: 34 MG/DL
LDLC SERPL CALC-MCNC: 114 MG/DL
MAGNESIUM SERPL-MCNC: 1.8 MG/DL (ref 1.7–2.3)
NONHDLC SERPL-MCNC: 167 MG/DL
PHOSPHATE SERPL-MCNC: 3.6 MG/DL (ref 2.5–4.5)
POTASSIUM SERPL-SCNC: 3.9 MMOL/L (ref 3.4–5.3)
PROT SERPL-MCNC: 6.4 G/DL (ref 6.4–8.3)
SODIUM SERPL-SCNC: 140 MMOL/L (ref 135–145)
TRIGL SERPL-MCNC: 264 MG/DL

## 2024-07-12 ENCOUNTER — TELEPHONE (OUTPATIENT)
Dept: ONCOLOGY | Facility: CLINIC | Age: 44
End: 2024-07-12
Payer: COMMERCIAL

## 2024-07-12 NOTE — TELEPHONE ENCOUNTER
Oral Chemotherapy Monitoring Program    Lab Monitoring Plan    Labs drawn outside of [place]: No  Subjective/Objective:  Connor Emerson is a 44 year old male contacted by phone for an initial visit for oral chemotherapy education.          5/15/2024     8:00 AM 5/17/2024    12:00 PM 6/11/2024     7:00 AM 6/11/2024    11:00 AM 7/9/2024     3:00 PM 7/9/2024     3:42 PM 7/12/2024     9:00 AM   ORAL CHEMOTHERAPY   Assessment Type Refill Other Refill Lab Monitoring Discontinuation Initial Work up New Teach   Stop Date     7/8/2024     Reason for Discontinuation     Disease progression     Diagnosis Code Non-Small Cell Lung Cancer Non-Small Cell Lung Cancer Non-Small Cell Lung Cancer Non-Small Cell Lung Cancer Non-Small Cell Lung Cancer Non-Small Cell Lung Cancer Non-Small Cell Lung Cancer   Providers Dr Cori Persaud   [facility]   Is this patient followed by the [facility] OC team? No No No No No No No   Drug Name Alunbrig (brigatinib) Alunbrig (brigatinib) Alunbrig (brigatinib) Alunbrig (brigatinib) Alunbrig (brigatinib) Lorbrena (lorlatinib) Lorbrena (lorlatinib)   Dose 120 mg 120 mg 120 mg 120 mg 120 mg 100 mg 100 mg   Current Schedule Daily Daily Daily Daily Daily Daily Daily   Cycle Details Continuous Continuous Continuous Drug on Hold Drug on Hold Continuous Continuous   Other (See Note for Details)    G3 lipase elevation      Pharmacist intervention(other)    Yes      Intervention(s)    Recommend drug hold      Any new drug interactions?      Yes    Pharmacist Intervention?      Yes    Intervention(s)      Patient Education    Is the dose as ordered appropriate for the patient?      Yes Yes       Last PHQ-2 Score on record:       6/19/2024     8:00 AM 12/22/2023     1:46 PM   PHQ-2 ( 1999 [employer])   Q1: Little interest or pleasure in doing things 3 0   Q2: Feeling down, depressed or  "hopeless 3 0   PHQ-2 Score 6 0   Q1: Little interest or pleasure in doing things Nearly every day    Q2: Feeling down, depressed or hopeless Nearly every day    PHQ-2 Score 6        Vitals:  BP:   BP Readings from Last 1 Encounters:   06/26/24 (!) 147/104     Wt Readings from Last 1 Encounters:   07/08/24 98.4 kg (217 lb)     Estimated body surface area is 2.19 meters squared as calculated from the following:    Height as of 7/8/24: 1.753 m (5' 9\").    Weight as of 7/8/24: 98.4 kg (217 lb).    Labs:  _  Result Component Current Result Ref Range   Sodium 140 (7/10/2024) 135 - 145 mmol/L     _  Result Component Current Result Ref Range   Potassium 3.9 (7/10/2024) 3.4 - 5.3 mmol/L     _  Result Component Current Result Ref Range   Calcium 9.4 (7/10/2024) 8.6 - 10.0 mg/dL     _  Result Component Current Result Ref Range   Magnesium 1.8 (7/10/2024) 1.7 - 2.3 mg/dL     _  Result Component Current Result Ref Range   Phosphorus 3.6 (7/10/2024) 2.5 - 4.5 mg/dL     _  Result Component Current Result Ref Range   Albumin 4.3 (7/10/2024) 3.5 - 5.2 g/dL     _  Result Component Current Result Ref Range   Urea Nitrogen 30.8 (H) (7/10/2024) 6.0 - 20.0 mg/dL     _  Result Component Current Result Ref Range   Creatinine 0.97 (7/10/2024) 0.67 - 1.17 mg/dL     _  Result Component Current Result Ref Range   AST 20 (7/10/2024) 0 - 45 U/L     _  Result Component Current Result Ref Range   ALT 18 (7/10/2024) 0 - 70 U/L     _  Result Component Current Result Ref Range   Bilirubin Total 0.4 (7/10/2024) <=1.2 mg/dL     _  Result Component Current Result Ref Range   WBC Count 14.5 (H) (7/10/2024) 4.0 - 11.0 10e3/uL     _  Result Component Current Result Ref Range   Hemoglobin 15.0 (7/10/2024) 13.3 - 17.7 g/dL     _  Result Component Current Result Ref Range   Platelet Count 260 (7/10/2024) 150 - 450 10e3/uL     No results found for ANC within last 30 days.     _  Result Component Current Result Ref Range   Absolute Neutrophils 10.7 (H) " (7/10/2024) 1.6 - 8.3 10e3/uL        Assessment:  Patient is appropriate to start therapy. Pending baseline EKG and confirmation from Dr. Persaud in the context of recent syncopal episodes.    Plan:  Basic chemotherapy teaching was reviewed with the patient including indication, start date of therapy, dose, administration, adverse effects, missed doses, food and drug interactions, monitoring, side effect management, office contact information, and safe handling. Written materials were provided and all questions answered.    Follow-Up:  7/15: look for baseline EKG     Grace Myhre, PharmD  Hematology/Oncology Pharmacist  [facility]  [phone]

## 2024-07-17 ENCOUNTER — PRE VISIT (OUTPATIENT)
Dept: RADIATION ONCOLOGY | Facility: CLINIC | Age: 44
End: 2024-07-17

## 2024-07-17 ENCOUNTER — HOSPITAL ENCOUNTER (OUTPATIENT)
Dept: CARDIOLOGY | Facility: CLINIC | Age: 44
Discharge: HOME | End: 2024-07-17
Attending: STUDENT IN AN ORGANIZED HEALTH CARE EDUCATION/TRAINING PROGRAM | Admitting: STUDENT IN AN ORGANIZED HEALTH CARE EDUCATION/TRAINING PROGRAM
Payer: COMMERCIAL

## 2024-07-17 DIAGNOSIS — Z79.899 ENCOUNTER FOR LONG-TERM (CURRENT) USE OF HIGH-RISK MEDICATION: Primary | ICD-10-CM

## 2024-07-17 DIAGNOSIS — I67.89 RADIATION THERAPY INDUCED BRAIN NECROSIS: ICD-10-CM

## 2024-07-17 DIAGNOSIS — Z79.899 ENCOUNTER FOR LONG-TERM (CURRENT) USE OF HIGH-RISK MEDICATION: ICD-10-CM
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
## 2024-07-17 DIAGNOSIS — C34.31 MALIGNANT NEOPLASM OF LOWER LOBE OF RIGHT LUNG (H): Primary | ICD-10-CM

## 2024-07-17 LAB
ATRIAL RATE - MUSE: 73 BPM
DIASTOLIC BLOOD PRESSURE - MUSE: NORMAL MMHG
INTERPRETATION ECG - MUSE: NORMAL
P AXIS - MUSE: 31 DEGREES
PR INTERVAL - MUSE: 152 MS
QRS DURATION - MUSE: 90 MS
QT - MUSE: 400 MS
QTC - MUSE: 440 MS
R AXIS - MUSE: 26 DEGREES
SYSTOLIC BLOOD PRESSURE - MUSE: NORMAL MMHG
T AXIS - MUSE: 48 DEGREES
VENTRICULAR RATE- MUSE: 73 BPM

## 2024-07-17 PROCEDURE — 93010 ELECTROCARDIOGRAM REPORT: CPT | Performed by: INTERNAL MEDICINE

## 2024-07-17 PROCEDURE — 93005 ELECTROCARDIOGRAM TRACING: CPT

## 2024-07-17 NOTE — NURSING NOTE
Date: 2024   Age: 44 year old  Ethnicity:    Sex: male  : 1980   Lives In: [place], [place]    Diagnosis: Stage IV NSCLC    Prior radiation therapy:   Site Treated: brain (11 areas)  Facility: [facility]  Dates: 2/15/22  Dose: 18-22 GY/lesion    Site Treated: at  Facility: at  Dates: at  Dose: at    Prior chemotherapy:   See below    Pain at time of consult?  Does patient have a living will?  Does patient have an implanted cardiac device?    RN time with patient:  Educated on gamma knife?    Fall Screen:  Have you fallen in the past week?   Have you felt unsteady when walking or standing in the past week?     Physicians: Dr. Bassam Persaud    Review Since Diagnosis:  Pt having progressive SOB, beginning of 22 thru 22: To ER at [facility] and admitted, thoracentesis x2 during the admission (1.7 liters and 2.0 liters removed), cytology showed malignancy, adenocarcinoma   22:right pleural mass biopsy, positive for adenocarcinoma consistent with lung primary, did another thoracentesis simultaneously (3 liters)  22:  PET/CT, right lower lobe lesion representing primary lung adenocarcinoma, right perihilar, bilateral pretracheal, subcarinal and superior mediastinal michele mets, few lung nodules suspicious for contralateral mets.  At least 3 intracranial mets.  Unspecific mild diffuse marrow uptake.  22: pt saw Dr. Persaud in consult to establish care, pt still SOB with lying down and on left side-but is improved.  Chronic cough for 2-3 months, occasional blood streaked. Night sweats recently.  Occasional headaches last few days.  Are awaiting genetic testing to determine systemic treatment.  Recommended a Pleurex catheter.  Stated would start dexamethasone 4 mg po daily for pt having some headaches.  Referral to Rad/Onc for opinion on radiation for brain mets.  Need Brain MRI  22: Brain MRI, 2.8 x 2.8 cm lesion right frontal                                2.1 x 2.3 cm lesion  left mid frontal                                0.5 x 0.8 cm lesion right parietal                                0.3 x 0.4 cm lesion left parietal                                0.2 cm lesion left frontal                                0.3 cm lesion left frontal                                1.5 x 1.9 cm lesion left cerebellum                                1.0 x 1.0 cm lesion left cerebellum                                1.1 x 1.7 cm lesion right temporal  No mutations identified in analyzed genes   2/15/22: gamma knife radiation as noted above.   2/16/22: Pleurex catheter placed   3/2/22: Started alectinib  Routine CT CAPs  4/2/22- 4/5/22: admitted with severe sepsis due to MSSA infection right Pleurex catheter. Catheter removed  5/2/22: CT CAP: overall positive response  5/2/22: brain mri shows brain mets are mildly diminished in size vs 2/5/22. No new mets  6/10/22: presented to ED with aphasia and word finding difficulty. Brain mri showed multiple intracranial mets. Due to his anxiety patient left AMA  6/15/22: admitted to [facility].   6/16/22: brain mri shows multiple intracranial mets with enlargement of dominant lesion in left frontal lobe.   6/28/22: admitted for left frontal craniotomy with resection of brain tumor due to ongoing symptoms. Path showing radiation necrosis-no evidence of tumor. (Dr. Moran)  8/1/22: brain mri. Re-demonstrated postsurgical changes   9/28/22-10/26: Bevacizumab for radiation necrosis. Stopped due to PE  10/26/22: brain mri-overall improved appearance of multiple intracranial mets. No definite new or progressive mets.   11/7/22-11/9/22: admitted for PE and HTN urgency. Small pulmonary embolism in the RLL pulmonary artery. Started on Lovenox  12/20/22: stopped alectinib  1/6/23: CT CAP: new nodularity in NEPTALI; may be infectious. No other change.   1/6/23: brain mri stable  2/23/23: start brigatinib  4/20/23: CT CAP stable  4/20/23: brain mri-possible progression. Right frontal lobe,  largest met, has increased in size. Multiple new/enlarging mets scattered throughout the cerebral hemispheres and cerebellum. Started Dexamethasone  6/16/23: brain mri increase in size of the necrotic lesion right frontal lobe. Interval increase in two adjacent mets at the frontoparietal junction on the left. Remaining lesion stable. Dr. Moran recommends surgery to lesion due to worsening headaches and more problems walking.   6/27/23: right frontal craniotomy with resection. (Dean) Final path is radiation necrosis  7/10/23: CT CAP stable  7/20/23-11/14/23: Started bevacizumab for radiation necrosis. Discontinued due to severe HTN  12/5/22: PET/CT: sole site of disease RLL. No other sites of disease. Reviewed at tumor board. Recommendation for no surgery and continue brigatinib  10/25/23: brain mri stable  1/29/24: brain mri stable  Lost to follow-up due to insurance issues  3/15/24 and 6/21/24: CT CAP stable  6/26/24: brain mri shows numerous new and increased size of intracranial mets. Increased enhancement about the medial aspect of the right frontal lobe resection margin. Stable left frontal lobe resection   7/1/24: discussion between Dean Cordon and [facility]. Fused brain scan with his previous treatments and count 22 small lesions. Plan to treat with gamma knife.   7/8/24: video visit with Dr. Persaud. Falls asleep @ 5:30pm. Working 8 hours wears him out. Taking methadone and oxycodone PRN for right sided pain. No new headache. Notes vision change (things are not clear). Low appetite. Working with new PCP for insulin regulation. Patient to meet with rad onc for brain mets. Continues on brigatinib. Plan to switch to lorlatinib. Will discuss with rad onc.     Chief Complaint: at time of visit

## 2024-07-18 DIAGNOSIS — E78.1 HYPERTRIGLYCERIDEMIA: Primary | ICD-10-CM

## 2024-07-18 RX ORDER — ROSUVASTATIN CALCIUM 5 MG/1
5 TABLET, COATED ORAL DAILY
Qty: 90 TABLET | Refills: 0 | Status: SHIPPED | OUTPATIENT
Start: 2024-07-18 | End: 2024-08-30

## 2024-07-20 ENCOUNTER — MYC REFILL (OUTPATIENT)
Dept: PALLIATIVE CARE | Facility: CLINIC | Age: 44
End: 2024-07-20
Payer: COMMERCIAL

## 2024-07-20 DIAGNOSIS — D49.6 BRAIN TUMOR (H): ICD-10-CM

## 2024-07-20 DIAGNOSIS — Z98.890 S/P CRANIOTOMY: ICD-10-CM

## 2024-07-21 NOTE — TELEPHONE ENCOUNTER
Received COFCOt message from patient requesting refill of oxycodone.     Last refill: 7/12/24  Last office visit: 6/6/24  Scheduled for follow up 8/1/24     Will route request to NP for review.     Reviewed [place]  Report.

## 2024-07-22 ENCOUNTER — PATIENT OUTREACH (OUTPATIENT)
Dept: ONCOLOGY | Facility: CLINIC | Age: 44
End: 2024-07-22
Payer: COMMERCIAL

## 2024-07-22 NOTE — PROGRESS NOTES
Called Pt to relay, Per Dr. Persaud, that we Rx'd Crestor to his pharmacy.  Pt verbalized understanding.

## 2024-07-23 ENCOUNTER — HOSPITAL ENCOUNTER (INPATIENT)
Facility: CLINIC | Age: 44
LOS: 2 days | Discharge: HOME | End: 2024-07-25
Attending: EMERGENCY MEDICINE | Admitting: INTERNAL MEDICINE
Payer: COMMERCIAL

## 2024-07-23 ENCOUNTER — APPOINTMENT (OUTPATIENT)
Dept: GENERAL RADIOLOGY | Facility: CLINIC | Age: 44
End: 2024-07-23
Attending: EMERGENCY MEDICINE
Payer: COMMERCIAL

## 2024-07-23 ENCOUNTER — APPOINTMENT (OUTPATIENT)
Dept: CT IMAGING | Facility: CLINIC | Age: 44
End: 2024-07-23
Attending: EMERGENCY MEDICINE
Payer: COMMERCIAL

## 2024-07-23 DIAGNOSIS — C34.90 PRIMARY MALIGNANT NEOPLASM OF LUNG METASTATIC TO OTHER SITE, UNSPECIFIED LATERALITY (H): Primary | ICD-10-CM

## 2024-07-23 DIAGNOSIS — F41.9 ANXIETY: ICD-10-CM
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
## 2024-07-23 DIAGNOSIS — R55 SYNCOPE, UNSPECIFIED SYNCOPE TYPE: ICD-10-CM

## 2024-07-23 DIAGNOSIS — C78.01 MALIGNANT NEOPLASM METASTATIC TO BOTH LUNGS (H): ICD-10-CM

## 2024-07-23 DIAGNOSIS — C78.02 MALIGNANT NEOPLASM METASTATIC TO BOTH LUNGS (H): ICD-10-CM

## 2024-07-23 DIAGNOSIS — I10 HTN, GOAL BELOW 140/90: ICD-10-CM

## 2024-07-23 DIAGNOSIS — F43.23 ADJUSTMENT DISORDER WITH MIXED ANXIETY AND DEPRESSED MOOD: ICD-10-CM

## 2024-07-23 DIAGNOSIS — C79.31 MALIGNANT NEOPLASM METASTATIC TO BRAIN (H): ICD-10-CM

## 2024-07-23 DIAGNOSIS — C34.31 MALIGNANT NEOPLASM OF LOWER LOBE OF RIGHT LUNG (H): ICD-10-CM

## 2024-07-23 DIAGNOSIS — I67.89 RADIATION THERAPY INDUCED BRAIN NECROSIS: ICD-10-CM

## 2024-07-23 LAB
ALBUMIN SERPL BCG-MCNC: 4.2 G/DL (ref 3.5–5.2)
ALP SERPL-CCNC: 135 U/L (ref 40–150)
ALT SERPL W P-5'-P-CCNC: 18 U/L (ref 0–70)
ANION GAP SERPL CALCULATED.3IONS-SCNC: 13 MMOL/L (ref 7–15)
AST SERPL W P-5'-P-CCNC: 16 U/L (ref 0–45)
ATRIAL RATE - MUSE: 71 BPM
BASOPHILS # BLD AUTO: 0.1 10E3/UL (ref 0–0.2)
BASOPHILS NFR BLD AUTO: 1 %
BILIRUB SERPL-MCNC: 0.3 MG/DL
BUN SERPL-MCNC: 21.1 MG/DL (ref 6–20)
CALCIUM SERPL-MCNC: 9.8 MG/DL (ref 8.8–10.4)
CHLORIDE SERPL-SCNC: 100 MMOL/L (ref 98–107)
CK SERPL-CCNC: 109 U/L (ref 39–308)
CREAT SERPL-MCNC: 0.66 MG/DL (ref 0.67–1.17)
DIASTOLIC BLOOD PRESSURE - MUSE: NORMAL MMHG
EGFRCR SERPLBLD CKD-EPI 2021: >90 ML/MIN/1.73M2
EOSINOPHIL # BLD AUTO: 0.8 10E3/UL (ref 0–0.7)
EOSINOPHIL NFR BLD AUTO: 4 %
ERYTHROCYTE [DISTWIDTH] IN BLOOD BY AUTOMATED COUNT: 13.3 % (ref 10–15)
GLUCOSE BLDC GLUCOMTR-MCNC: 299 MG/DL (ref 70–99)
GLUCOSE SERPL-MCNC: 462 MG/DL (ref 70–99)
HCO3 SERPL-SCNC: 24 MMOL/L (ref 22–29)
HCT VFR BLD AUTO: 44.3 % (ref 40–53)
HGB BLD-MCNC: 15.1 G/DL (ref 13.3–17.7)
HOLD SPECIMEN: NORMAL
IMM GRANULOCYTES # BLD: 0.1 10E3/UL
IMM GRANULOCYTES NFR BLD: 1 %
INTERPRETATION ECG - MUSE: NORMAL
LACTATE SERPL-SCNC: 1.1 MMOL/L (ref 0.7–2)
LYMPHOCYTES # BLD AUTO: 3.1 10E3/UL (ref 0.8–5.3)
LYMPHOCYTES NFR BLD AUTO: 18 %
MAGNESIUM SERPL-MCNC: 2 MG/DL (ref 1.7–2.3)
MCH RBC QN AUTO: 31.2 PG (ref 26.5–33)
MCHC RBC AUTO-ENTMCNC: 34.1 G/DL (ref 31.5–36.5)
MCV RBC AUTO: 92 FL (ref 78–100)
MONOCYTES # BLD AUTO: 1.5 10E3/UL (ref 0–1.3)
MONOCYTES NFR BLD AUTO: 8 %
NEUTROPHILS # BLD AUTO: 12 10E3/UL (ref 1.6–8.3)
NEUTROPHILS NFR BLD AUTO: 68 %
NRBC # BLD AUTO: 0 10E3/UL
NRBC BLD AUTO-RTO: 0 /100
P AXIS - MUSE: 41 DEGREES
PLATELET # BLD AUTO: 288 10E3/UL (ref 150–450)
POTASSIUM SERPL-SCNC: 4.1 MMOL/L (ref 3.4–5.3)
PR INTERVAL - MUSE: 152 MS
PROT SERPL-MCNC: 6.7 G/DL (ref 6.4–8.3)
QRS DURATION - MUSE: 82 MS
QT - MUSE: 406 MS
QTC - MUSE: 441 MS
R AXIS - MUSE: 49 DEGREES
RBC # BLD AUTO: 4.84 10E6/UL (ref 4.4–5.9)
SODIUM SERPL-SCNC: 137 MMOL/L (ref 135–145)
SYSTOLIC BLOOD PRESSURE - MUSE: NORMAL MMHG
T AXIS - MUSE: 77 DEGREES
TROPONIN T SERPL HS-MCNC: 7 NG/L
VENTRICULAR RATE- MUSE: 71 BPM
WBC # BLD AUTO: 17.5 10E3/UL (ref 4–11)

## 2024-07-23 PROCEDURE — 83735 ASSAY OF MAGNESIUM: CPT | Performed by: EMERGENCY MEDICINE

## 2024-07-23 PROCEDURE — 36415 COLL VENOUS BLD VENIPUNCTURE: CPT | Performed by: EMERGENCY MEDICINE

## 2024-07-23 PROCEDURE — 82550 ASSAY OF CK (CPK): CPT | Performed by: STUDENT IN AN ORGANIZED HEALTH CARE EDUCATION/TRAINING PROGRAM

## 2024-07-23 PROCEDURE — 99207 PR NO CHARGE LOS: CPT | Performed by: STUDENT IN AN ORGANIZED HEALTH CARE EDUCATION/TRAINING PROGRAM

## 2024-07-23 PROCEDURE — 83605 ASSAY OF LACTIC ACID: CPT

## 2024-07-23 PROCEDURE — 99285 EMERGENCY DEPT VISIT HI MDM: CPT | Mod: 25 | Performed by: EMERGENCY MEDICINE

## 2024-07-23 PROCEDURE — 93010 ELECTROCARDIOGRAM REPORT: CPT | Performed by: EMERGENCY MEDICINE

## 2024-07-23 PROCEDURE — 99223 1ST HOSP IP/OBS HIGH 75: CPT | Mod: GC | Performed by: INTERNAL MEDICINE

## 2024-07-23 PROCEDURE — 71046 X-RAY EXAM CHEST 2 VIEWS: CPT | Mod: 26 | Performed by: RADIOLOGY

## 2024-07-23 PROCEDURE — 93005 ELECTROCARDIOGRAM TRACING: CPT | Performed by: EMERGENCY MEDICINE

## 2024-07-23 PROCEDURE — 120N000005 HC R&B MS OVERFLOW UMMC

## 2024-07-23 PROCEDURE — 70450 CT HEAD/BRAIN W/O DYE: CPT

## 2024-07-23 PROCEDURE — 250N000013 HC RX MED GY IP 250 OP 250 PS 637

## 2024-07-23 PROCEDURE — 85025 COMPLETE CBC W/AUTO DIFF WBC: CPT | Performed by: EMERGENCY MEDICINE

## 2024-07-23 PROCEDURE — 70450 CT HEAD/BRAIN W/O DYE: CPT | Mod: 26 | Performed by: RADIOLOGY

## 2024-07-23 PROCEDURE — 71046 X-RAY EXAM CHEST 2 VIEWS: CPT

## 2024-07-23 PROCEDURE — 99285 EMERGENCY DEPT VISIT HI MDM: CPT | Performed by: EMERGENCY MEDICINE

## 2024-07-23 PROCEDURE — 36415 COLL VENOUS BLD VENIPUNCTURE: CPT

## 2024-07-23 PROCEDURE — 258N000003 HC RX IP 258 OP 636: Performed by: EMERGENCY MEDICINE

## 2024-07-23 PROCEDURE — 80053 COMPREHEN METABOLIC PANEL: CPT | Performed by: EMERGENCY MEDICINE

## 2024-07-23 PROCEDURE — 84484 ASSAY OF TROPONIN QUANT: CPT | Performed by: EMERGENCY MEDICINE

## 2024-07-23 RX ORDER — DULOXETIN HYDROCHLORIDE 30 MG/1
30 CAPSULE, DELAYED RELEASE ORAL 2 TIMES DAILY
Status: DISCONTINUED | OUTPATIENT
Start: 2024-07-23 | End: 2024-07-25 | Stop reason: HOSPADM

## 2024-07-23 RX ORDER — LISINOPRIL 10 MG/1
40 TABLET ORAL DAILY
Status: DISCONTINUED | OUTPATIENT
Start: 2024-07-24 | End: 2024-07-25 | Stop reason: HOSPADM

## 2024-07-23 RX ORDER — NALOXONE HYDROCHLORIDE 0.4 MG/ML
0.2 INJECTION, SOLUTION INTRAMUSCULAR; INTRAVENOUS; SUBCUTANEOUS
Status: DISCONTINUED | OUTPATIENT
Start: 2024-07-23 | End: 2024-07-25 | Stop reason: HOSPADM

## 2024-07-23 RX ORDER — BACLOFEN 5 MG/1
5-10 TABLET ORAL 3 TIMES DAILY PRN
Status: DISCONTINUED | OUTPATIENT
Start: 2024-07-23 | End: 2024-07-25 | Stop reason: HOSPADM

## 2024-07-23 RX ORDER — POLYETHYLENE GLYCOL 3350 17 G/17G
17 POWDER, FOR SOLUTION ORAL 2 TIMES DAILY PRN
Status: DISCONTINUED | OUTPATIENT
Start: 2024-07-23 | End: 2024-07-25 | Stop reason: HOSPADM

## 2024-07-23 RX ORDER — HYDRALAZINE HYDROCHLORIDE 25 MG/1
50 TABLET, FILM COATED ORAL 2 TIMES DAILY
Status: DISCONTINUED | OUTPATIENT
Start: 2024-07-23 | End: 2024-07-25 | Stop reason: HOSPADM

## 2024-07-23 RX ORDER — LIDOCAINE 40 MG/G
CREAM TOPICAL
Status: DISCONTINUED | OUTPATIENT
Start: 2024-07-23 | End: 2024-07-25 | Stop reason: HOSPADM

## 2024-07-23 RX ORDER — NALOXONE HYDROCHLORIDE 0.4 MG/ML
0.4 INJECTION, SOLUTION INTRAMUSCULAR; INTRAVENOUS; SUBCUTANEOUS
Status: DISCONTINUED | OUTPATIENT
Start: 2024-07-23 | End: 2024-07-25 | Stop reason: HOSPADM

## 2024-07-23 RX ORDER — PROCHLORPERAZINE MALEATE 5 MG
10 TABLET ORAL EVERY 6 HOURS PRN
Status: DISCONTINUED | OUTPATIENT
Start: 2024-07-23 | End: 2024-07-25 | Stop reason: HOSPADM

## 2024-07-23 RX ORDER — AMOXICILLIN 250 MG
2 CAPSULE ORAL 2 TIMES DAILY PRN
Status: DISCONTINUED | OUTPATIENT
Start: 2024-07-23 | End: 2024-07-25 | Stop reason: HOSPADM

## 2024-07-23 RX ORDER — AMOXICILLIN 250 MG
1 CAPSULE ORAL 2 TIMES DAILY PRN
Status: DISCONTINUED | OUTPATIENT
Start: 2024-07-23 | End: 2024-07-25 | Stop reason: HOSPADM

## 2024-07-23 RX ORDER — OXYCODONE HYDROCHLORIDE 10 MG/1
10 TABLET ORAL
Status: DISCONTINUED | OUTPATIENT
Start: 2024-07-23 | End: 2024-07-25 | Stop reason: HOSPADM

## 2024-07-23 RX ORDER — LEVETIRACETAM 500 MG/1
1000 TABLET ORAL 2 TIMES DAILY
Status: DISCONTINUED | OUTPATIENT
Start: 2024-07-23 | End: 2024-07-25 | Stop reason: HOSPADM

## 2024-07-23 RX ORDER — DEXTROSE MONOHYDRATE 25 G/50ML
25-50 INJECTION, SOLUTION INTRAVENOUS
Status: DISCONTINUED | OUTPATIENT
Start: 2024-07-23 | End: 2024-07-25 | Stop reason: HOSPADM

## 2024-07-23 RX ORDER — NICOTINE POLACRILEX 4 MG
15-30 LOZENGE BUCCAL
Status: DISCONTINUED | OUTPATIENT
Start: 2024-07-23 | End: 2024-07-25 | Stop reason: HOSPADM

## 2024-07-23 RX ORDER — ALBUTEROL SULFATE 90 UG/1
2 AEROSOL, METERED RESPIRATORY (INHALATION) EVERY 6 HOURS PRN
Status: DISCONTINUED | OUTPATIENT
Start: 2024-07-23 | End: 2024-07-25 | Stop reason: HOSPADM

## 2024-07-23 RX ORDER — CALCIUM CARBONATE 500 MG/1
1000 TABLET, CHEWABLE ORAL 4 TIMES DAILY PRN
Status: DISCONTINUED | OUTPATIENT
Start: 2024-07-23 | End: 2024-07-25 | Stop reason: HOSPADM

## 2024-07-23 RX ORDER — ACETAMINOPHEN 325 MG/1
325-650 TABLET ORAL EVERY 6 HOURS PRN
Status: DISCONTINUED | OUTPATIENT
Start: 2024-07-23 | End: 2024-07-25 | Stop reason: HOSPADM

## 2024-07-23 RX ORDER — PROPRANOLOL HYDROCHLORIDE 40 MG/1
40 TABLET ORAL 3 TIMES DAILY
Status: DISCONTINUED | OUTPATIENT
Start: 2024-07-23 | End: 2024-07-25 | Stop reason: HOSPADM

## 2024-07-23 RX ORDER — OXYCODONE HYDROCHLORIDE 10 MG/1
10 TABLET ORAL
Status: DISCONTINUED | OUTPATIENT
Start: 2024-07-23 | End: 2024-07-23

## 2024-07-23 RX ORDER — HYDROCHLOROTHIAZIDE 50 MG/1
50 TABLET ORAL DAILY
Status: DISCONTINUED | OUTPATIENT
Start: 2024-07-24 | End: 2024-07-25 | Stop reason: HOSPADM

## 2024-07-23 RX ADMIN — RIVAROXABAN 20 MG: 20 TABLET, FILM COATED ORAL at 22:04

## 2024-07-23 RX ADMIN — SODIUM CHLORIDE 1000 ML: 9 INJECTION, SOLUTION INTRAVENOUS at 16:57

## 2024-07-23 RX ADMIN — LEVETIRACETAM 1000 MG: 500 TABLET, FILM COATED ORAL at 22:04

## 2024-07-23 RX ADMIN — METHADONE HYDROCHLORIDE 7.5 MG: 5 TABLET ORAL at 22:04

## 2024-07-23 ASSESSMENT — ACTIVITIES OF DAILY LIVING (ADL)
ADLS_ACUITY_SCORE: 35
ADLS_ACUITY_SCORE: 20
ADLS_ACUITY_SCORE: 35
ADLS_ACUITY_SCORE: 35
ADLS_ACUITY_SCORE: 20

## 2024-07-23 ASSESSMENT — COLUMBIA-SUICIDE SEVERITY RATING SCALE - C-SSRS
2. HAVE YOU ACTUALLY HAD ANY THOUGHTS OF KILLING YOURSELF IN THE PAST MONTH?: NO
1. IN THE PAST MONTH, HAVE YOU WISHED YOU WERE DEAD OR WISHED YOU COULD GO TO SLEEP AND NOT WAKE UP?: NO
6. HAVE YOU EVER DONE ANYTHING, STARTED TO DO ANYTHING, OR PREPARED TO DO ANYTHING TO END YOUR LIFE?: NO

## 2024-07-23 NOTE — LETTER
[facility] [address] [facility]  [address] [facility] [address]  Phone: [phone]  Fax: [phone]    July 25, 2024        Connor Emerson  [address] [facility] [facility] [address]  [place] [address]          To whom it may concern:    RE: Connor Ryan was seen in the hospital at [facility] and was under my care at the time of discharge. He does not have activity restrictions - he should be able to do what he was permitted to do prior to this hospital stay. If he feels light headed or sweaty he should be permitted to sit or lie down for a period of up to 30 minutes.     Please contact me for questions or concerns.      Sincerely,      Kole Hinojosa, DO

## 2024-07-23 NOTE — CONSULTS
[facility]  Neurology Consult[facility]    Patient Name:  Connor Emerson  MRN:  [id number]    :  1980  Date of Service:  2024  Primary care provider:  Bassam Persaud      Neurology consultation service was asked to see Connor Emerson by Dr. Mejia to evaluate syncope and possible seizures.    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    He has had 4 episodes in the last ~10 days, 2 last week and 2 this week. He starts feeling tingling in his hands and feet and then sees white and passes out. One time he was standing talking to a coworker when he had an episode. The coworker did not witness any shaking, urinary or fecal incontinence, and the patient reported no tongue or cheek biting. He was caught by his coworker and did not hit his head. Another episode occurred when he started to feel lightheaded after looking up at a box on a shelf. His most recent episode was this morning. He had been sitting doing work at his desk and started to feel unwell so started to walk outside and syncopized. He had the same tingling feeling in his hands and feet before hand. It takes him a few minutes to feel less confused after the episodes. He feels like over the last 2-3 weeks he has had worsening blurry vision. He wears contacts which have an up to date prescription. He denies feeling lightheaded with position changes. He denies hearing changes, weakness, loss of sensation aside from during the episodes, or balance changes. He denies increased shortness of breath (has some at baseline), chest pain, palpitations, cough, fever, dysuria, diarrhea. He reports some nausea today but none prior to  today. He has been eating and drinking his normal amount.    He reports that about a week ago he started taking a new medication called lorlatinib through his oncologist. He states that his pharmacist told him there could be some side effects and is wondering if this is a side effect.    He had a history of possible seizure in 2022 which involved his R up rising uncontrollably. Did not lose consciousness. EEG after episode showed intermittent left frontotemporal slowing which could represent structural lesion vs epileptogenic focus. Since this time, he has taken Keppra 1000 mg BID and does not miss doses. He has not noticed further seizure activity.    ROS  A comprehensive ROS was performed and pertinent findings were included in HPI.     PMH  Past Medical History:   Diagnosis Date    Atypical chest pain 12/02/2013    Cancer (H)     Complication of anesthesia     Diabetes (H)     GERD (gastroesophageal reflux disease) 12/02/2013    History of pulmonary embolism     HTN (hypertension) 05/14/2012    HTN, goal below 140/90 07/02/2013    Insomnia 02/21/2012    Mediastinal lymphadenopathy     Metastasis to brain (H) 2022    Migraine headache 07/02/2013    Migraine with aura, without mention of intractable migraine without mention of status migrainosus     Pneumonia      Past Surgical History:   Procedure Laterality Date    BRONCHOSCOPY RIGID OR FLEXIBLE W/TRANSENDOSCOPIC ENDOBRONCHIAL ULTRASOUND GUIDED Bilateral 1/26/2022    Procedure: Right BRONCHOSCOPY, FIBEROPTIC, endobronchial ultrasound, pleural biopsy;  Surgeon: Dallin Agrawal MD;  Location: [facility] OR    INJECT BLOCK MEDIAL BRANCH CERVICAL/THORACIC/LUMBAR      INSERT CHEST TUBE Right 2/16/2022    Procedure: INSERTION, CATHETER, INTERCOSTAL, FOR DRAINAGE;  Surgeon: Dallin Agrawal MD;  Location: [facility] GI    INSERT CHEST TUBE Right 3/9/2022    Procedure: INSERTION, CATHETER, INTERCOSTAL, FOR DRAINAGE;  Surgeon: Sushila Antonio MD;  Location: [facility] GI    IR CHEST TUBE  REMOVAL TUNNELED RIGHT  4/2/2022    OPTICAL TRACKING SYSTEM CRANIOTOMY, EXCISE TUMOR, COMBINED Left 6/28/2022    Procedure: Left stealth craniotomy for tumor resection with motor mapping;  Surgeon: Stephen Moran MD;  Location:  OR    OPTICAL TRACKING SYSTEM CRANIOTOMY, EXCISE TUMOR, COMBINED Right 6/27/2023    Procedure: Right stealth craniotomy and resection of tumor;  Surgeon: Stephen Moran MD;  Location:  OR    ORTHOPEDIC SURGERY      Ganesh. Rotator cuff repair.    PLEUROSCOPY N/A 1/26/2022    Procedure: Pleuroscopy with Pleural Biopsy;  Surgeon: Dallin Agrawal MD;  Location: [facility] OR       Medications   I have personally reviewed the patient's medication list.     Allergies  I have personally reviewed the patient's allergy list.     Social History  Takes up to 150 mg of THC daily, smoking + gummies. Drinks alcohol rarely, last drank on 4th of July and had 2 drinks. No tobacco use. No other drugs    Family History    Reviewed, and notably negative for neurologic disorders or seizures      Physical Examination   Vitals: /76   Pulse 72   Temp 97.7  F (36.5  C) (Oral)   Resp 18   SpO2 93%   General: Sitting in chair, NAD  Head: NC/AT  Eyes: no icterus, op pink and moist  Cardiac: RRR. Extremities warm, no edema.   Respiratory: non-labored on RA  GI: S/NT/ND  Skin: No rash or lesion on exposed skin  Psych: Mood pleasant, affect congruent  Neuro:  Mental status: Awake, alert, attentive, oriented to self, time, place, and circumstance. Language is fluent and coherent with intact comprehension of complex commands, naming and repetition.  Cranial nerves: VFF, PERRL, conjugate gaze, EOMI, facial sensation intact, face symmetric, shoulder shrug strong, tongue/uvula midline, no dysarthria.   Motor: Normal bulk and tone. No abnormal movements. 5/5 strength bilaterally in deltoids, biceps, triceps, hand , hip flexors, hip extensors, knee flexion, knee extension, plantarflexion, dorsiflexion.    Reflexes: Normo-reflexic and symmetric biceps, brachioradialis, triceps, patellae, and achilles. No clonus, toes down-going.  Coordination: FNF and HS without ataxia or dysmetria.    Gait: Normal width, stride length, turn, and arm swing. Station normal.     Investigations   I have personally reviewed pertinent labs, tests, and radiological imaging. Discussion of notable findings is included under Impression.     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    Description of events could be consistent with temporal lobe seizures, and the patient does have several temporal lobe lesions on recent brain MRI 6/26 as well as prior seizure history. He also has a leukocytosis despite no fever or infectious symptoms or steroid exposure, which potentially could have been caused by seizure. Other non-neurologic etiologies remain on the differential including vasovagal, orthostatic hypotension, infection, cardiac, and medication related- possibly related to starting lorlatinib recently given overlapping timeline with syncopal episodes starting.    Recommendations  - Overnight EEG to evaluate for seizure activity  - Check lactate given possible seizure  - Continue PTA Keppra 1000 mg BID  - Further syncope workup, leukocytosis workup, BG management per medicine    Thank you for involving Neurology in the care of Connor Emerson.  Please do not hesitate to call with questions/concerns (consult pager [phone]).      among other differential diagnoses including orthostatic hypotension and vasovagal syncope    Patient was seen and discussed with Dr. Combs.    Chelsea Savage MD  Internal Medicine PGY3

## 2024-07-23 NOTE — ED PROVIDER NOTES
[facility] EMERGENCY DEPARTMENT ([facility])    7/23/24       ED PROVIDER NOTE   History     Chief Complaint   Patient presents with    Syncope     HPI  Connor Emerson is a 44 year old male with a history of seizures, brain metastases and tumor, metastatic lung cancer, malignant neoplasm of lower lobe of right lung, HTN, GERD, DM II, and chronic migraine who presents to the ED for episodes of syncopal episodes. He reports that over the last couple weeks he has had multiple syncopal episodes. He reports that he will feel light headed, sweaty, and have some tingling in the extremities before having a syncopal episode. No associated chest pain or shortness of breath. He denies change in oral intake or vomiting. He states he started some new medications a few weeks ago so thinks that could be related. Over the last couple weeks he has also had some bilateral blurry vision at times.    Past Medical History  Past Medical History:   Diagnosis Date    Atypical chest pain 12/02/2013    Cancer (H)     Complication of anesthesia     Diabetes (H)     GERD (gastroesophageal reflux disease) 12/02/2013    History of pulmonary embolism     HTN (hypertension) 05/14/2012    HTN, goal below 140/90 07/02/2013    Insomnia 02/21/2012    Mediastinal lymphadenopathy     Metastasis to brain (H) 2022    Migraine headache 07/02/2013    Migraine with aura, without mention of intractable migraine without mention of status migrainosus     Pneumonia      Past Surgical History:   Procedure Laterality Date    BRONCHOSCOPY RIGID OR FLEXIBLE W/TRANSENDOSCOPIC ENDOBRONCHIAL ULTRASOUND GUIDED Bilateral 1/26/2022    Procedure: Right BRONCHOSCOPY, FIBEROPTIC, endobronchial ultrasound, pleural biopsy;  Surgeon: Dallin Agrawal MD;  Location: [facility] OR    INJECT BLOCK MEDIAL BRANCH CERVICAL/THORACIC/LUMBAR      INSERT CHEST TUBE Right 2/16/2022    Procedure: INSERTION, CATHETER, INTERCOSTAL, FOR DRAINAGE;  Surgeon: Dallin Agrawal MD;  Location:  [facility] GI    INSERT CHEST TUBE Right 3/9/2022    Procedure: INSERTION, CATHETER, INTERCOSTAL, FOR DRAINAGE;  Surgeon: Sushila Antonio MD;  Location: [facility]    IR CHEST TUBE REMOVAL TUNNELED RIGHT  4/2/2022    OPTICAL TRACKING SYSTEM CRANIOTOMY, EXCISE TUMOR, COMBINED Left 6/28/2022    Procedure: Left stealth craniotomy for tumor resection with motor mapping;  Surgeon: Stephen Moran MD;  Location:  OR    OPTICAL TRACKING SYSTEM CRANIOTOMY, EXCISE TUMOR, COMBINED Right 6/27/2023    Procedure: Right stealth craniotomy and resection of tumor;  Surgeon: Stephen Moran MD;  Location:  OR    ORTHOPEDIC SURGERY      Ganesh. Rotator cuff repair.    PLEUROSCOPY N/A 1/26/2022    Procedure: Pleuroscopy with Pleural Biopsy;  Surgeon: Dallin Agrawal MD;  Location: [facility] OR     acetaminophen (TYLENOL) 325 MG tablet  albuterol (PROAIR HFA/PROVENTIL HFA/VENTOLIN HFA) 108 (90 Base) MCG/ACT inhaler  Alcohol Swabs PADS  baclofen (LIORESAL) 10 MG tablet  blood glucose (NO BRAND SPECIFIED) lancets standard  blood glucose (NO BRAND SPECIFIED) test strip  Blood Glucose Monitoring Suppl (ACCU-CHEK GUIDE) w/Device KIT  DULoxetine (CYMBALTA) 30 MG capsule  FLUoxetine (PROZAC) 20 MG capsule  hydrALAZINE (APRESOLINE) 50 MG tablet  hydrochlorothiazide (HYDRODIURIL) 50 MG tablet  insulin aspart (NOVOLOG PEN) 100 UNIT/ML pen  insulin glargine (LANTUS PEN) 100 UNIT/ML pen  insulin pen needle (32G X 4 MM) 32G X 4 MM miscellaneous  levETIRAcetam (KEPPRA) 1000 MG tablet  lisinopril (ZESTRIL) 40 MG tablet  lorlatinib (LORBRENA) 100 MG  methadone (DOLOPHINE) 5 MG tablet  methylphenidate (RITALIN) 5 MG tablet  naloxone (NARCAN) 4 MG/0.1ML nasal spray  oxyCODONE IR (ROXICODONE) 10 MG tablet  prochlorperazine (COMPAZINE) 10 MG tablet  propranolol (INDERAL) 20 MG tablet  rivaroxaban ANTICOAGULANT (XARELTO) 20 MG TABS tablet  rosuvastatin (CRESTOR) 5 MG tablet      Allergies   Allergen Reactions    Vicodin [Hydrocodone-Acetaminophen] Nausea and  Vomiting and GI Disturbance     Family History  Family History   Problem Relation Age of Onset    Unknown/Adopted Mother     Uterine Cancer Mother     Unknown/Adopted Father     Unknown/Adopted Maternal Grandmother     Unknown/Adopted Maternal Grandfather     Stomach Cancer Maternal Grandfather     Unknown/Adopted Paternal Grandmother     Unknown/Adopted Paternal Grandfather     Melanoma Maternal Uncle      Social History   Social History     Tobacco Use    Smoking status: Never     Passive exposure: Never    Smokeless tobacco: Never   Vaping Use    Vaping status: Never Used   Substance Use Topics    Alcohol use: Yes     Alcohol/week: 0.0 standard drinks of alcohol     Comment: social    Drug use: No      A medically appropriate review of systems was performed with pertinent positives and negatives noted in the HPI, and all other systems negative.    Physical Exam   BP: 112/76  Pulse: 72  Temp: 97.7  F (36.5  C)  Resp: 18  SpO2: 93 %  Physical Exam  Constitutional:       General: He is not in acute distress.     Appearance: He is not diaphoretic.   HENT:      Head: Normocephalic and atraumatic.      Nose: Nose normal.      Mouth/Throat:      Mouth: Mucous membranes are moist.      Pharynx: Oropharynx is clear.   Eyes:      Conjunctiva/sclera: Conjunctivae normal.      Pupils: Pupils are equal, round, and reactive to light.   Cardiovascular:      Rate and Rhythm: Normal rate and regular rhythm.      Heart sounds: No murmur heard.  Pulmonary:      Effort: Pulmonary effort is normal. No respiratory distress.      Breath sounds: No wheezing or rales.   Musculoskeletal:         General: Normal range of motion.   Skin:     General: Skin is warm and dry.   Neurological:      General: No focal deficit present.      Mental Status: He is alert and oriented to person, place, and time.           ED Course, Procedures, & Data      Procedures            EKG Interpretation:      Interpreted by Celso Ponce MD  Time reviewed:  1:15  Symptoms at time of EKG: None   Rhythm: normal sinus   Rate: Normal  Ectopy: none  Conduction: normal  ST Segments/ T Waves: T wave inversion in I and aVL  Comparison to prior: T wave inversions.      Clinical Impression: non-specific EKG                  Results for orders placed or performed during the hospital encounter of 07/23/24   CT Head w/o Contrast     Status: None    Narrative    EXAM: CT HEAD W/O CONTRAST  7/23/2024 2:42 PM     HISTORY:  syncopal episodes, hx of metastatic brain lesions       COMPARISON:  Brain MRI 6/26/2024. Head CT 12/27/2023.    TECHNIQUE: Using multidetector thin collimation helical acquisition  technique, axial, coronal and sagittal CT images from the skull base  to the vertex were obtained without intravenous contrast.   (topogram) image(s) also obtained and reviewed.    FINDINGS:  Right frontal and high left parietal craniotomy changes again  demonstrated. Right frontal resection cavity with peripheral  calcification, unchanged. Punctate calcification anterior left frontal  lobe, also unchanged. Multiple scattered cerebral and cerebellar  metastases much better demonstrated on the prior MRI examination.    No acute intracranial hemorrhage, mass effect, or midline shift. No  acute loss of gray-white matter differentiation in the cerebral  hemispheres. Ventricles are proportionate to the cerebral sulci. Clear  basal cisterns.    The visualized portions of the paranasal sinuses and mastoid air cells  are clear. Grossly normal orbits.       Impression    IMPRESSION:  1. No acute intracranial pathology.   2. Multiple cerebral and cerebellar metastases much better  demonstrated on the recent prior MRI examination.    DENIZ BAILEY MD         SYSTEM ID:  [id number]   Chest XR,  PA & LAT     Status: None    Narrative    Chest 2 views    INDICATION: Syncope    COMPARISON: Chest abdomen pelvis CT 6/21/2024    FINDINGS: Heart size normal. Elevated right hemidiaphragm. This  is  similar to the recent CT. No consolidation.      Impression    IMPRESSION: Mildly elevated right hemidiaphragm unchanged from CT scan  last month.    WINSOME STEWARD MD         SYSTEM ID:  [id number]   Comprehensive metabolic panel     Status: Abnormal   Result Value Ref Range    Sodium 137 135 - 145 mmol/L    Potassium 4.1 3.4 - 5.3 mmol/L    Carbon Dioxide (CO2) 24 22 - 29 mmol/L    Anion Gap 13 7 - 15 mmol/L    Urea Nitrogen 21.1 (H) 6.0 - 20.0 mg/dL    Creatinine 0.66 (L) 0.67 - 1.17 mg/dL    GFR Estimate >90 >60 mL/min/1.73m2    Calcium 9.8 8.8 - 10.4 mg/dL    Chloride 100 98 - 107 mmol/L    Glucose 462 (H) 70 - 99 mg/dL    Alkaline Phosphatase 135 40 - 150 U/L    AST 16 0 - 45 U/L    ALT 18 0 - 70 U/L    Protein Total 6.7 6.4 - 8.3 g/dL    Albumin 4.2 3.5 - 5.2 g/dL    Bilirubin Total 0.3 <=1.2 mg/dL   Troponin T, High Sensitivity     Status: Normal   Result Value Ref Range    Troponin T, High Sensitivity 7 <=22 ng/L   Magnesium     Status: Normal   Result Value Ref Range    Magnesium 2.0 1.7 - 2.3 mg/dL   CBC with platelets and differential     Status: Abnormal   Result Value Ref Range    WBC Count 17.5 (H) 4.0 - 11.0 10e3/uL    RBC Count 4.84 4.40 - 5.90 10e6/uL    Hemoglobin 15.1 13.3 - 17.7 g/dL    Hematocrit 44.3 40.0 - 53.0 %    MCV 92 78 - 100 fL    MCH 31.2 26.5 - 33.0 pg    MCHC 34.1 31.5 - 36.5 g/dL    RDW 13.3 10.0 - 15.0 %    Platelet Count 288 150 - 450 10e3/uL    % Neutrophils 68 %    % Lymphocytes 18 %    % Monocytes 8 %    % Eosinophils 4 %    % Basophils 1 %    % Immature Granulocytes 1 %    NRBCs per 100 WBC 0 <1 /100    Absolute Neutrophils 12.0 (H) 1.6 - 8.3 10e3/uL    Absolute Lymphocytes 3.1 0.8 - 5.3 10e3/uL    Absolute Monocytes 1.5 (H) 0.0 - 1.3 10e3/uL    Absolute Eosinophils 0.8 (H) 0.0 - 0.7 10e3/uL    Absolute Basophils 0.1 0.0 - 0.2 10e3/uL    Absolute Immature Granulocytes 0.1 <=0.4 10e3/uL    Absolute NRBCs 0.0 10e3/uL   EKG 12 lead     Status: None (Preliminary result)    Result Value Ref Range    Systolic Blood Pressure  mmHg    Diastolic Blood Pressure  mmHg    Ventricular Rate 71 BPM    Atrial Rate 71 BPM    MO Interval 152 ms    QRS Duration 82 ms     ms    QTc 441 ms    P Axis 41 degrees    R AXIS 49 degrees    T Axis 77 degrees    Interpretation ECG Sinus rhythm  Normal ECG      CBC with platelets differential     Status: Abnormal    Narrative    The following orders were created for panel order CBC with platelets differential.  Procedure                               Abnormality         Status                     ---------                               -----------         ------                     CBC with platelets and d...[[id number]]  Abnormal            Final result                 Please view results for these tests on the individual orders.     Medications   sodium chloride 0.9% BOLUS 1,000 mL (1,000 mLs Intravenous $New Bag 7/23/24 7962)     Labs Ordered and Resulted from Time of ED Arrival to Time of ED Departure   COMPREHENSIVE METABOLIC PANEL - Abnormal       Result Value    Sodium 137      Potassium 4.1      Carbon Dioxide (CO2) 24      Anion Gap 13      Urea Nitrogen 21.1 (*)     Creatinine 0.66 (*)     GFR Estimate >90      Calcium 9.8      Chloride 100      Glucose 462 (*)     Alkaline Phosphatase 135      AST 16      ALT 18      Protein Total 6.7      Albumin 4.2      Bilirubin Total 0.3     CBC WITH PLATELETS AND DIFFERENTIAL - Abnormal    WBC Count 17.5 (*)     RBC Count 4.84      Hemoglobin 15.1      Hematocrit 44.3      MCV 92      MCH 31.2      MCHC 34.1      RDW 13.3      Platelet Count 288      % Neutrophils 68      % Lymphocytes 18      % Monocytes 8      % Eosinophils 4      % Basophils 1      % Immature Granulocytes 1      NRBCs per 100 WBC 0      Absolute Neutrophils 12.0 (*)     Absolute Lymphocytes 3.1      Absolute Monocytes 1.5 (*)     Absolute Eosinophils 0.8 (*)     Absolute Basophils 0.1      Absolute Immature Granulocytes 0.1       Absolute NRBCs 0.0     TROPONIN T, HIGH SENSITIVITY - Normal    Troponin T, High Sensitivity 7     MAGNESIUM - Normal    Magnesium 2.0     LACTIC ACID WHOLE BLOOD   CK TOTAL     CT Head w/o Contrast   Final Result   IMPRESSION:   1. No acute intracranial pathology.    2. Multiple cerebral and cerebellar metastases much better   demonstrated on the recent prior MRI examination.      DENIZ BAILEY MD            SYSTEM ID:  [id number]      Chest XR,  PA & LAT   Final Result   IMPRESSION: Mildly elevated right hemidiaphragm unchanged from CT scan   last month.      WINSOME STEWARD MD            SYSTEM ID:  [id number]             Critical care was not performed.     Medical Decision Making  The patient's presentation was of high complexity (an acute health issue posing potential threat to life or bodily function).    The patient's evaluation involved:  review of external note(s) from 3+ sources (oncology note, health encounters, progress notes)  review of 3+ test result(s) ordered prior to this encounter (prior MRI, CBC and BMP)  ordering and/or review of 3+ test(s) in this encounter (see separate area of note for details)  discussion of management or test interpretation with another health professional (see separate area of note for details)    The patient's management necessitated high risk (a decision regarding hospitalization).    Assessment & Plan    This is a 44-year-old male with history of lung cancer with brain metastases, seizures, diabetes here with syncopal episodes.  Here he was afebrile, vitals are reassuring and he denied any symptoms on ED.  An EKG was done that showed some new T wave inversions but no ischemic findings.  He denies any chest pain, shortness breath, lightheadedness, dizziness associated with his syncopal episodes at home or suspicion for cardiac etiology however a department obtained and was 7 which further suggest ACS is less likely.    A CT head was also obtained and negative for acute  findings.  I considered that the syncopal episodes could be seizures given his history and consulted neurology.  They did evaluate the patient and felt that both seizures are possible that are other causes that could be contributing to this syncope so recommend he be admitted to medicine and that they would follow and plan for a EEG.    Labs had shown leukocytosis in the ED.  He denies fever, chills, focal infectious symptoms so unclear if this is related to syncope, possible seizures, or his cancer. CXR did not show any focal consolidation.    I discussed these findings with the patient and recommended that he be admitted for further management which she was agreeable to.  I spoke to the hospitalist who did accept the patient.    I have reviewed the nursing notes. I have reviewed the findings, diagnosis, plan and need for follow up with the patient.    New Prescriptions    No medications on file       Final diagnoses:   Syncope, unspecified syncope type         formerly [facility] EMERGENCY DEPARTMENT  7/23/2024     Celso Ponce MD  07/23/24 [id number]

## 2024-07-23 NOTE — ED TRIAGE NOTES
Triage Assessment & Note:    There were no vitals taken for this visit.    Patient presents with: PT is having episodes of syncope/seizure?  PT started new meds and has new masses on his brain.     Home Treatments/Remedies:  None    Febrile / Afebrile? Afebrile     Duration of C/o:  1 day    Efrem Bunch RN  July 23, 2024

## 2024-07-24 ENCOUNTER — APPOINTMENT (OUTPATIENT)
Dept: NEUROLOGY | Facility: CLINIC | Age: 44
End: 2024-07-24
Payer: COMMERCIAL

## 2024-07-24 LAB
ANION GAP SERPL CALCULATED.3IONS-SCNC: 8 MMOL/L (ref 7–15)
BUN SERPL-MCNC: 20.5 MG/DL (ref 6–20)
CALCIUM SERPL-MCNC: 9 MG/DL (ref 8.8–10.4)
CHLORIDE SERPL-SCNC: 102 MMOL/L (ref 98–107)
CREAT SERPL-MCNC: 0.76 MG/DL (ref 0.67–1.17)
EGFRCR SERPLBLD CKD-EPI 2021: >90 ML/MIN/1.73M2
ERYTHROCYTE [DISTWIDTH] IN BLOOD BY AUTOMATED COUNT: 13.4 % (ref 10–15)
GLUCOSE BLDC GLUCOMTR-MCNC: 265 MG/DL (ref 70–99)
GLUCOSE BLDC GLUCOMTR-MCNC: 286 MG/DL (ref 70–99)
GLUCOSE BLDC GLUCOMTR-MCNC: 300 MG/DL (ref 70–99)
GLUCOSE BLDC GLUCOMTR-MCNC: 310 MG/DL (ref 70–99)
GLUCOSE BLDC GLUCOMTR-MCNC: 335 MG/DL (ref 70–99)
GLUCOSE BLDC GLUCOMTR-MCNC: 375 MG/DL (ref 70–99)
GLUCOSE SERPL-MCNC: 315 MG/DL (ref 70–99)
HCO3 SERPL-SCNC: 28 MMOL/L (ref 22–29)
HCT VFR BLD AUTO: 42.2 % (ref 40–53)
HGB BLD-MCNC: 14.1 G/DL (ref 13.3–17.7)
MCH RBC QN AUTO: 31.1 PG (ref 26.5–33)
MCHC RBC AUTO-ENTMCNC: 33.4 G/DL (ref 31.5–36.5)
MCV RBC AUTO: 93 FL (ref 78–100)
PLATELET # BLD AUTO: 180 10E3/UL (ref 150–450)
POTASSIUM SERPL-SCNC: 4.4 MMOL/L (ref 3.4–5.3)
RBC # BLD AUTO: 4.54 10E6/UL (ref 4.4–5.9)
SODIUM SERPL-SCNC: 138 MMOL/L (ref 135–145)
WBC # BLD AUTO: 11.2 10E3/UL (ref 4–11)

## 2024-07-24 PROCEDURE — 99207 EEG VIDEO 12-26 HR UNMONITORED: CPT | Performed by: PSYCHIATRY & NEUROLOGY

## 2024-07-24 PROCEDURE — 99233 SBSQ HOSP IP/OBS HIGH 50: CPT | Performed by: STUDENT IN AN ORGANIZED HEALTH CARE EDUCATION/TRAINING PROGRAM

## 2024-07-24 PROCEDURE — 250N000012 HC RX MED GY IP 250 OP 636 PS 637

## 2024-07-24 PROCEDURE — 99255 IP/OBS CONSLTJ NEW/EST HI 80: CPT | Mod: FS | Performed by: PHYSICIAN ASSISTANT

## 2024-07-24 PROCEDURE — 95720 EEG PHY/QHP EA INCR W/VEEG: CPT | Performed by: PSYCHIATRY & NEUROLOGY

## 2024-07-24 PROCEDURE — 999N000127 HC STATISTIC PERIPHERAL IV START W US GUIDANCE

## 2024-07-24 PROCEDURE — 80048 BASIC METABOLIC PNL TOTAL CA: CPT

## 2024-07-24 PROCEDURE — 85027 COMPLETE CBC AUTOMATED: CPT

## 2024-07-24 PROCEDURE — 36415 COLL VENOUS BLD VENIPUNCTURE: CPT

## 2024-07-24 PROCEDURE — 120N000005 HC R&B MS OVERFLOW UMMC

## 2024-07-24 PROCEDURE — 250N000013 HC RX MED GY IP 250 OP 250 PS 637

## 2024-07-24 PROCEDURE — 95714 VEEG EA 12-26 HR UNMNTR: CPT

## 2024-07-24 PROCEDURE — 250N000012 HC RX MED GY IP 250 OP 636 PS 637: Performed by: STUDENT IN AN ORGANIZED HEALTH CARE EDUCATION/TRAINING PROGRAM

## 2024-07-24 RX ORDER — OXYCODONE HYDROCHLORIDE 10 MG/1
10 TABLET ORAL
Qty: 120 TABLET | Refills: 0 | Status: SHIPPED | OUTPATIENT
Start: 2024-07-26 | End: 2024-08-03

## 2024-07-24 RX ADMIN — METHADONE HYDROCHLORIDE 7.5 MG: 5 TABLET ORAL at 09:37

## 2024-07-24 RX ADMIN — INSULIN HUMAN 20 UNITS: 100 INJECTION, SUSPENSION SUBCUTANEOUS at 20:35

## 2024-07-24 RX ADMIN — INSULIN ASPART 3 UNITS: 100 INJECTION, SOLUTION INTRAVENOUS; SUBCUTANEOUS at 17:43

## 2024-07-24 RX ADMIN — DULOXETINE HYDROCHLORIDE 30 MG: 30 CAPSULE, DELAYED RELEASE ORAL at 14:42

## 2024-07-24 RX ADMIN — RIVAROXABAN 20 MG: 20 TABLET, FILM COATED ORAL at 17:40

## 2024-07-24 RX ADMIN — DULOXETINE HYDROCHLORIDE 30 MG: 30 CAPSULE, DELAYED RELEASE ORAL at 20:40

## 2024-07-24 RX ADMIN — FLUOXETINE 20 MG: 20 CAPSULE ORAL at 14:46

## 2024-07-24 RX ADMIN — LEVETIRACETAM 1000 MG: 500 TABLET, FILM COATED ORAL at 20:40

## 2024-07-24 RX ADMIN — LEVETIRACETAM 1000 MG: 500 TABLET, FILM COATED ORAL at 09:37

## 2024-07-24 RX ADMIN — METHADONE HYDROCHLORIDE 7.5 MG: 5 TABLET ORAL at 20:41

## 2024-07-24 RX ADMIN — INSULIN ASPART 4 UNITS: 100 INJECTION, SOLUTION INTRAVENOUS; SUBCUTANEOUS at 09:50

## 2024-07-24 RX ADMIN — INSULIN ASPART 5 UNITS: 100 INJECTION, SOLUTION INTRAVENOUS; SUBCUTANEOUS at 11:46

## 2024-07-24 ASSESSMENT — ACTIVITIES OF DAILY LIVING (ADL)
ADLS_ACUITY_SCORE: 20
DEPENDENT_IADLS:: INDEPENDENT
ADLS_ACUITY_SCORE: 20

## 2024-07-24 NOTE — CONSULTS
Oncology  Consult Note   Date of Service: 07/24/2024    Patient: Connor Emerson  MRN: [id number]  Admission Date: 7/23/2024  Hospital Day # 1  Cancer Diagnosis: Metastatic Lung Adenocarcinoma   Primary Outpatient Oncologist: Dr. Persaud   Current Treatment Plan: Lorlatinib      Reason for Consult: Pt with h/o NSCLC with brain mets, recently started on Lorlatinib and now having transient LOC. Medication side effect?     Assessment & Plan:   Connor Emerson is a 44 year old male with past medical history of seizures, HTN, GERD, T2DM, chronic migraine and metastatic lung adenocarcinoma with brain mets. Currently admitted with recurrent syncopal episodes.     Recommendations:   - Continue Lorlatinib while working up etiology for syncopal episodes   - Will send off repeat NGS testing with [employer] (ordered for you)   - Agree with neurology consult appreciate recs   - Agree with telemetry to monitor for cardiac etiology of syncopal episodes     # Recurrent Syncopal Episodes   Patient has had ~4 syncopal episodes since starting the Lorlatinib. He has weakness and profuse sweating as a prodrome prior to the syncopal events. He has not identified any provoking events for his syncopal episodes. Has a blood pressure monitor at home but not monitoring his blood pressure. No obvious signs of seizure activity of post-ictal state after these events.   - Agree with neurology consult   - Agree with telemetry     # Metastatic Adenocarcinoma of the Lung   Oncology history obtained from outpatient notes. In brief, patient presented to [facility] in January 2022 with progressive SOB and was found to have large right sided pleural effusion and cytology was positive for adenocarcinoma. He underwent a right pleural mass biopsy in January 2022 confirming diagnosis adenocarcinoma c/w lung primary. MR Brain with at least 9 intracranial mets for which he received GK to 12 lesions. He was started on Alectinib (3/2/22). He was found to have  "progression of brain mets in June 2022 and underwent left stealth craniotomy with resection of brain tumor, pathology showed radiation necrosis. He received Avastin for radiation necrosis starting in September 2022. Alectinib was on hold starting in December 2022.  He was started on Brigatinib in Feburary 2022. He underwent another right stealth craniotomy and resection of tumor June 2023. He most recently was started on Loralatinib.      Patient was seen and plan of care was discussed with attending physician Dr. Billingsley      Thank you for the opportunity to partake in this patients plan of care. Please do not hesitate to page with questions. We will continue to follow.     I spent >60 minutes face-to-face or coordinating care of Connor Emerson. Over 50% of our time on the unit was spent counseling the patient and/or coordinating care.     Sydney Sam PA-C   Hematology/Oncology  Pager #[phone]    ___________________________________________________________________     Subjective & Interval History:    Nursing notes reviewed. Connor was seen resting comfortably in bed getting setup for EEG monitoring. He reports that he was sitting in an air conditioned room with his most recent syncopal episode when he became weak and sweaty and lost consciousness. Reports the entire event took place ~3 min. He did not have any palpitations or fast heart rates. He does not have any confusion or post-ictal states afterwards. Discussed that we have talked with Dr. Persaud and will plan to continue his Loralatinib while we are working up his syncopal events. All questions answered at this time.    Physical Exam:    Blood pressure 104/61, pulse 63, temperature 97.9  F (36.6  C), temperature source Oral, resp. rate 16, height 1.753 m (5' 9.02\"), weight 97.7 kg (215 lb 6.4 oz), SpO2 98%.  General: alert and cooperative, lying in bed, no acute distress  Resp:  normal respiratory effort on ambient air  MSK: warm and well-perfused, normal " tone  Skin: no rashes on limited exam, no jaundice  Neuro: Alert and interactive, moves all extremities equally, no focal deficits    Past Medical History:  Past Medical History:   Diagnosis Date    Atypical chest pain 12/02/2013    Cancer (H)     Complication of anesthesia     Diabetes (H)     GERD (gastroesophageal reflux disease) 12/02/2013    History of pulmonary embolism     HTN (hypertension) 05/14/2012    HTN, goal below 140/90 07/02/2013    Insomnia 02/21/2012    Mediastinal lymphadenopathy     Metastasis to brain (H) 2022    Migraine headache 07/02/2013    Migraine with aura, without mention of intractable migraine without mention of status migrainosus     Pneumonia        Past Surgical History:  Past Surgical History:   Procedure Laterality Date    BRONCHOSCOPY RIGID OR FLEXIBLE W/TRANSENDOSCOPIC ENDOBRONCHIAL ULTRASOUND GUIDED Bilateral 1/26/2022    Procedure: Right BRONCHOSCOPY, FIBEROPTIC, endobronchial ultrasound, pleural biopsy;  Surgeon: Dallin Agrawal MD;  Location: [facility] OR    INJECT BLOCK MEDIAL BRANCH CERVICAL/THORACIC/LUMBAR      INSERT CHEST TUBE Right 2/16/2022    Procedure: INSERTION, CATHETER, INTERCOSTAL, FOR DRAINAGE;  Surgeon: Dallin Agrawal MD;  Location: [facility] GI    INSERT CHEST TUBE Right 3/9/2022    Procedure: INSERTION, CATHETER, INTERCOSTAL, FOR DRAINAGE;  Surgeon: Sushila Antonio MD;  Location: [facility] GI    IR CHEST TUBE REMOVAL TUNNELED RIGHT  4/2/2022    OPTICAL TRACKING SYSTEM CRANIOTOMY, EXCISE TUMOR, COMBINED Left 6/28/2022    Procedure: Left stealth craniotomy for tumor resection with motor mapping;  Surgeon: Stephen Moran MD;  Location:  OR    OPTICAL TRACKING SYSTEM CRANIOTOMY, EXCISE TUMOR, COMBINED Right 6/27/2023    Procedure: Right stealth craniotomy and resection of tumor;  Surgeon: Stephen Moran MD;  Location:  OR    ORTHOPEDIC SURGERY      Ganesh. Rotator cuff repair.    PLEUROSCOPY N/A 1/26/2022    Procedure: Pleuroscopy with Pleural Biopsy;  Surgeon: Nghia  Dallin Martins MD;  Location:  OR       Social History:  Social History     Socioeconomic History    Marital status:      Spouse name: Kylie    Number of children: 4   Tobacco Use    Smoking status: Never     Passive exposure: Never    Smokeless tobacco: Never   Vaping Use    Vaping status: Never Used   Substance and Sexual Activity    Alcohol use: Yes     Alcohol/week: 0.0 standard drinks of alcohol     Comment: social    Drug use: No    Sexual activity: Yes     Partners: Female   Other Topics Concern    Parent/sibling w/ CABG, MI or angioplasty before 65F 55M? No        Family History  Family History   Problem Relation Age of Onset    Unknown/Adopted Mother     Uterine Cancer Mother     Unknown/Adopted Father     Unknown/Adopted Maternal Grandmother     Unknown/Adopted Maternal Grandfather     Stomach Cancer Maternal Grandfather     Unknown/Adopted Paternal Grandmother     Unknown/Adopted Paternal Grandfather     Melanoma Maternal Uncle        Outpatient Medications:  Current Facility-Administered Medications   Medication Dose Route Frequency Provider Last Rate Last Admin    acetaminophen (TYLENOL) tablet 325-650 mg  325-650 mg Oral Q6H PRN Vick Simms MD        albuterol (PROVENTIL HFA/VENTOLIN HFA) inhaler  2 puff Inhalation Q6H PRN Vick Simms MD        Baclofen (LIORESAL) tablet 5-10 mg  5-10 mg Oral TID PRN Vick Simms MD        calcium carbonate (TUMS) chewable tablet 1,000 mg  1,000 mg Oral 4x Daily PRN Vick Simms MD        glucose gel 15-30 g  15-30 g Oral Q15 Min PRN Vick Simms MD        Or    dextrose 50 % injection 25-50 mL  25-50 mL Intravenous Q15 Min PRN Vick Simms MD        Or    glucagon injection 1 mg  1 mg Subcutaneous Q15 Min PRN Vick Simms MD        DULoxetine (CYMBALTA) DR capsule 30 mg  30 mg Oral BID Vick Simms MD        FLUoxetine (PROzac) capsule 20 mg  20 mg Oral Daily Vick Simms MD        [Held by provider] hydrALAZINE (APRESOLINE) tablet 50 mg   50 mg Oral BID Vick Smims MD        [Held by provider] hydrochlorothiazide (HYDRODIURIL) tablet 50 mg  50 mg Oral Daily Vick Simms MD        insulin aspart (NovoLOG) injection (RAPID ACTING)  1-7 Units Subcutaneous TID AC Vick Simms MD        insulin aspart (NovoLOG) injection (RAPID ACTING)  1-5 Units Subcutaneous At Bedtime Vick Simms MD   2 Units at 07/23/24 2226    [Held by provider] insulin glargine (LANTUS PEN) injection 50 Units  50 Units Subcutaneous QAM Vick Simms MD        levETIRAcetam (KEPPRA) tablet 1,000 mg  1,000 mg Oral BID Vick Simms MD   1,000 mg at 07/23/24 2204    lidocaine (LMX4) cream   Topical Q1H PRN Vick Simms MD        lidocaine 1 % 0.1-1 mL  0.1-1 mL Other Q1H PRN Vick Simms MD        [Held by provider] lisinopril (ZESTRIL) tablet 40 mg  40 mg Oral Daily Vick Simms MD        lorlatinib (LORBRENA) tablet 100 mg  100 mg Oral Daily Vick Simms MD        methadone (DOLOPHINE) half-tab 7.5 mg  7.5 mg Oral BID Vick Simms MD   7.5 mg at 07/23/24 2204    naloxone (NARCAN) injection 0.2 mg  0.2 mg Intravenous Q2 Min PRN Bebeto Whiten, RPH        Or    naloxone (NARCAN) injection 0.4 mg  0.4 mg Intravenous Q2 Min PRN Bebeto Whiten, RPH        Or    naloxone (NARCAN) injection 0.2 mg  0.2 mg Intramuscular Q2 Min PRN Bebeto Whiten, RPH        Or    naloxone (NARCAN) injection 0.4 mg  0.4 mg Intramuscular Q2 Min PRN Caty White, RPH        oxyCODONE IR (ROXICODONE) tablet 10 mg  10 mg Oral Q3H PRN Vick Simms MD        Patient is already receiving anticoagulation with heparin, enoxaparin (LOVENOX), warfarin (COUMADIN)  or other anticoagulant medication   Does not apply Continuous PRN Vick Simms MD        polyethylene glycol (MIRALAX) Packet 17 g  17 g Oral BID PRN Vick Simms MD        prochlorperazine (COMPAZINE) tablet 10 mg  10 mg Oral Q6H PRN Vick Simms MD        [Held by provider] propranolol (INDERAL) tablet 40 mg  40 mg Oral TID Mendez  MD Vick        rivaroxaban ANTICOAGULANT (XARELTO) tablet 20 mg  20 mg Oral Daily with supper Vick Simms MD   20 mg at 07/23/24 2204    senna-docusate (SENOKOT-S/PERICOLACE) 8.6-50 MG per tablet 1 tablet  1 tablet Oral BID PRN Vick Simms MD        Or    senna-docusate (SENOKOT-S/PERICOLACE) 8.6-50 MG per tablet 2 tablet  2 tablet Oral BID PRN Vick Simms MD        sodium chloride (PF) 0.9% PF flush 3 mL  3 mL Intracatheter Q8H Vick Simms MD   3 mL at 07/24/24 0344    sodium chloride (PF) 0.9% PF flush 3 mL  3 mL Intracatheter q1 min prVick Acevedo MD              Labs & Studies: I personally reviewed the following studies:  ROUTINE LABS (Last four results):  CMP  Recent Labs   Lab 07/24/24  0748 07/24/24  0658 07/23/24  2140 07/23/24  1334   NA  --  138  --  137   POTASSIUM  --  4.4  --  4.1   CHLORIDE  --  102  --  100   CO2  --  28  --  24   ANIONGAP  --  8  --  13   * 315* 299* 462*   BUN  --  20.5*  --  21.1*   CR  --  0.76  --  0.66*   GFRESTIMATED  --  >90  --  >90   TORY  --  9.0  --  9.8   MAG  --   --   --  2.0   PROTTOTAL  --   --   --  6.7   ALBUMIN  --   --   --  4.2   BILITOTAL  --   --   --  0.3   ALKPHOS  --   --   --  135   AST  --   --   --  16   ALT  --   --   --  18     CBC  Recent Labs   Lab 07/24/24  0658 07/23/24  1334   WBC 11.2* 17.5*   RBC 4.54 4.84   HGB 14.1 15.1   HCT 42.2 44.3   MCV 93 92   MCH 31.1 31.2   MCHC 33.4 34.1   RDW 13.4 13.3    288     INRNo lab results found in last 7 days.    IMAGING:

## 2024-07-24 NOTE — PHARMACY-ADMISSION MEDICATION HISTORY
Pharmacist Admission Medication History    Admission medication history is complete. The information provided in this note is only as accurate as the sources available at the time of the update.    Information Source(s): Patient, Clinic records, and CareEverywhere/SureScripts via in-person    Pertinent Information: Performed a brief medication history to clarify a few medications as was being started on EEG monitoring at the time.  He was started on fluoxetine on 6/19/24 and remains on duloxetine for per pain management.  His wife is planning to bring his oral NSCLC medication lorlatinib.     Changes made to PTA medication list:  Added: None  Deleted: None  Changed: None    Allergies reviewed with patient and updates made in EHR: no    Medication History Completed By: Alton Hargrove [facility] 7/24/2024 11:31 AM    PTA Med List   Medication Sig Last Dose    acetaminophen (TYLENOL) 325 MG tablet Take 325-650 mg by mouth every 6 hours as needed for mild pain     albuterol (PROAIR HFA/PROVENTIL HFA/VENTOLIN HFA) 108 (90 Base) MCG/ACT inhaler Inhale 2 puffs into the lungs every 6 hours as needed for shortness of breath, wheezing or cough     baclofen (LIORESAL) 10 MG tablet Take 0.5-1 tablets (5-10 mg) by mouth 3 times daily as needed for other (hiccups)     DULoxetine (CYMBALTA) 30 MG capsule Take 1 capsule (30 mg) by mouth 2 times daily     FLUoxetine (PROZAC) 20 MG capsule Take 1 capsule (20 mg) by mouth daily     hydrALAZINE (APRESOLINE) 50 MG tablet Take 1 tablet (50 mg) by mouth 2 times daily     hydrochlorothiazide (HYDRODIURIL) 50 MG tablet Take 1 tablet (50 mg) by mouth daily     insulin aspart (NOVOLOG PEN) 100 UNIT/ML pen Inject 0-40 Units Subcutaneous 3 times daily (before meals) Per discharge instructions sliding scale     insulin glargine (LANTUS PEN) 100 UNIT/ML pen Inject 70 Units Subcutaneous every morning     levETIRAcetam (KEPPRA) 1000 MG tablet Take 1 tablet (1,000 mg) by mouth 2 times daily      lisinopril (ZESTRIL) 40 MG tablet Take 1 tablet (40 mg) by mouth daily     lorlatinib (LORBRENA) 100 MG Take 1 tablet (100 mg) by mouth daily     methadone (DOLOPHINE) 5 MG tablet Take 1.5 tablets (7.5 mg) by mouth 2 times daily     methylphenidate (RITALIN) 5 MG tablet Take 1-2 tablets (5-10 mg) by mouth 2 times daily as needed (fatigue) Take second dose by 12 noon, if it is needed     naloxone (NARCAN) 4 MG/0.1ML nasal spray Spray 1 spray (4 mg) into one nostril alternating nostrils as needed for opioid reversal every 2-3 minutes until assistance arrives     [START ON 7/26/2024] oxyCODONE IR (ROXICODONE) 10 MG tablet Take 1 tablet (10 mg) by mouth every 3 hours as needed for moderate pain     prochlorperazine (COMPAZINE) 10 MG tablet Take 1 tablet (10 mg) by mouth every 6 hours as needed for nausea or vomiting     propranolol (INDERAL) 20 MG tablet Take 2 tablets (40 mg) by mouth 3 times daily     rivaroxaban ANTICOAGULANT (XARELTO) 20 MG TABS tablet Take 1 tablet (20 mg) by mouth daily (with dinner)     rosuvastatin (CRESTOR) 5 MG tablet Take 1 tablet (5 mg) by mouth daily

## 2024-07-24 NOTE — PLAN OF CARE
Problem: Adult Inpatient Plan of Care  Goal: Readiness for Transition of Care  Flowsheets  Taken 7/24/2024 1531  Anticipated Changes Related to Illness: none  Transportation Anticipated: family or friend will provide  Concerns to be Addressed: no discharge needs identified  Barriers to Discharge: n/a  Taken 7/24/2024 1520  Transportation Anticipated: family or friend will provide  Concerns to be Addressed: no discharge needs identified   Goal Outcome Evaluation:  Outcome: Progressing

## 2024-07-24 NOTE — PLAN OF CARE
Transfer  Transferred from: ED  Via:  Reason for transfer: Pt appropriate for 5C-   Family: Aware of transfer  Belongings: Received with pt  Chart: Received with pt  Medications: Meds received from old unit with pt  Code Status verified on armband: yes  2 RN Skin Assessment Completed By: Angelika Rao rec completed: no  Suction/Ambu bag/Flowmeter at bedside: yes  Report received from:   Pt status: Stable

## 2024-07-24 NOTE — PROGRESS NOTES
"[facility]  Neurology Consultation - Progress Note    Patient Name:  Connor Emerson  Date of Service:  July 24, 2024    Subjective:    Last syncopal episode was yesterday (7/23) Prior to coming to the ED. In gaining a confirmatory history from him regarding the syncopal episodes, he denies any confusion  during or after they occur. He says he feels fatigued for a couple minutes after the episodes before returning to baseline.     He was waiting in the ED to be admitted late into the night, and so he is low on sleep today. EEG was placed this morning with recommendation for continuous monitoring at this point. Patient was laying in bed when we saw him, although he has spent some time walking around the room. He has never had an episode while lying down, so we encouraged him to be active by taking walks while he has the EEG placed. This may help us observe an episode of his symptoms.    EKG on 7/23 in ED was unremarkable. WBC count is at 11.2 today down from 17.5 yesterday. Chest x-ray negative for infectious etiology. Lactate level within normal limits. Patient has history of DM type 2. Hb A1C on 6/19 was 10.1 up from 8.3 1 year ago. Blood glucose has been in 300s today.    Patient's BP was 104/61 this morning. Medicine team held his home BP medications of PTA hydralazine, PTA hydrochlorothiazide, propranolol, lisinopril. Last BP reading was 122/74. No orthostatic BP readings yet.    Heme/Onc recommends that he continue with Lorlatinib while we continue syncopal workup.     Objective:    Vitals: /74 (BP Location: Right arm)   Pulse 72   Temp 97.9  F (36.6  C) (Oral)   Resp 16   Ht 1.753 m (5' 9.02\")   Wt 97.7 kg (215 lb 6.4 oz)   SpO2 97%   BMI 31.79 kg/m    General: Lying in bed, NAD  Head: Atraumatic, normocephalic   Cardiac: no lower extremity edema, RRR  Neurologic:  Mental status: Awake, alert, attentive, oriented to self, time, place, and circumstance. Language " is fluent and coherent with intact comprehension of complex commands, naming and repetition.  Cranial nerves: VFF, PERRL, conjugate gaze, EOMI, facial sensation intact, face symmetric, shoulder shrug strong, tongue/uvula midline, no dysarthria.   Motor: Normal bulk and tone. No abnormal movements. 5/5 strength bilaterally in deltoids, biceps, triceps, hip flexors, hip extensors, knee flexion, knee extension, plantarflexion, dorsiflexion.   Reflexes: Normo-reflexic and symmetric biceps, brachioradialis, triceps, patellae, and achilles. No clonus, toes down-going.  Coordination: FNF without ataxia or dysmetria.    Gait: Deferred      Pertinent Investigations:    I have personally reviewed most recent and pertinent labs, tests, and radiological images.     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 Has experienced 4 syncopal episodes within the past ~10 days. Last episode was 7/23 prior to coming to the ED. New medication lorlatinib within past two weeks.     Differential diagnosis includes most likely vasovagal syncope vs. Orthostatic hypotension vs. Seizure. To evaluate for vasovagal/orthostatic syncope, we encouraged patient to stay active while in the hospital, taking walks and sitting up because he has not had an episode while lying down. Orthostatic vitals should be obtained.    Recommend control of patient's blood glucose. Patient is at increased risk for autonomic neuropathy with poorly controlled Type 2DM. For seizure, recent MRI on 6/26 showed new mets in the temporal lobe that can be associated with seizures with ictal asystole causing syncope.     Need to also rule out cardiac cause for syncope and Lorlatinib side effect. EKG on 7/23 was unremarkable, but continue to monitor with telemetry. Heme/Onc  currently recommends continuation of Lorlatinib as we work up other etiologies for syncope. Infectious etiology is not likely at this point as the patient has been afebrile, does not endorse urinary symptoms, chest xray was unremarkable for infectious, and leukocytosis has decreased to 11.2 today.    Recommendations:   -Continuous EEG monitoring. Patient is encouraged to be active/walk around as able to provoke episode while monitoring.  -Take orthostatic vitals  -continue cardiac telemetry  -control Blood glucose with insulin    -Neurology will continue to follow.     Thank you for involving Neurology in the care of Connor Emerson.  Please do not hesitate to call with questions/concerns (consult pager [phone]).      Patient was seen and discussed with Dr. Zelaya.    Lydia Dooley, MS3  Medical Student    Resident/Fellow Attestation   I, Vandana Clancy MD, was present with the medical/JULIO student who participated in the service and in the documentation of the note.  I have verified the history and personally performed the physical exam and medical decision making.  I agree with the assessment and plan of care as documented in the note.      Vandana Clancy MD  PGY-4 Neurology Resident

## 2024-07-24 NOTE — PROGRESS NOTES
INITIAL REPORT of Video-EEG Monitoring              DATE OF RECORDIN2024         I reviewed the first 2 hours of video-EEG monitoring of Connor Emerson.          The EEG during waking was abnormal due to occasional generalized theta slowing, with predominance of faster alpha-beta activities.  No interictal epileptiform abnormalities and no electrographic seizures were observed.         These abnormalities indicate mild electrographic encephalopathy.  This recording excludes non-convulsive status epilepticus.    Octaviano Antonio M.D.

## 2024-07-24 NOTE — PROGRESS NOTES
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    Transient loss of consciousness  Four episodes over two weeks, 2-5 minutes in duration with prodrome involving tingling/numbness in hands and feet, lightheadedness (\"seeing starts\"), profuse sweating, and some episodes peaking without loss of consciousness. Considerations include vasovagal syncope, dysrhythmia, orthostasis, medication induced (e.g. antihypertensives), seizure. Metastasis could be epileptogenic focus.  - Neuro Consult, Appreciate recs - seizure is considered with history/prodrome, otherwise unexplained leukocytosis, and possible epileptogenic focus in known brain metastases   - continuous EEG to evaluate for seizure activity, encourage walking to provoke episode  - Check lactate given possible seizure - negative  - Continue PTA Keppra 1000 mg BID  - Oncology Consulted given correlation of episodes' onset with lorlatinib  - cardiac monitoring 48h  - Orthostatic vitals - pending  - CT pulmonary angiography rules out current PE  - Largely normal (grade 1 diastolic dysfunction only, normal valves) TTE 2022    History of Generalized Seizure (2022)  - Continue levetiracetam 1000mg BID    NSCLC  Brain Metastases  Chronic Pain  Recently started on new chemotherapeutic regiment, Loratinib that preceded syncopal episodes. Follows with palliative for symptom management.  - Consult Oncology  - Continue Lorlatinib  - Continue PTA flexeril  - Continue PTA Oxycodone  - Continue PTA Methadone  - Continue PTA Tylenol    Hyperglycemia  DM ll  Patient's most recent A1C was 10.1 on 6/19/2024. On admission today his blood sugar is " "elevated to 462. Patient states that he had several sugary drinks prior to admission.  - Insulin glargine at reduced dose initially (35 units, from home 70 units)  - Adding medium intensity sliding scale      # Leukocytosis, improved  No infectious symptoms or exposures. Lactate wnl and afebrile.  - Continue to Monitor    HTN  Initial blood pressure was 112/76. Held home medications due to concerns for orthostatic hypotension. Awaiting further vitals.  - Held PTA Hydralazine  - Held PTA Hydrochlorothiazide  - Held Propanolol  - Held Lisinopril    Hx of Provoked PE by Malignancy vs Bevacizumab (11/2022)  Chronic Anticoagulation on Rivoraxaban  - continue DOAC          Diet: Combination Diet Regular Diet Adult    DVT Prophylaxis: DOAC  Heart Catheter: Not present  Lines: None     Cardiac Monitoring: ACTIVE order. Indication: Transient loss of Conciousness  Code Status: Full Code      Clinically Significant Risk Factors Present on Admission               # Drug Induced Coagulation Defect: home medication list includes an anticoagulant medication    # Hypertension: Noted on problem list            # DMII: A1C = 10.6 % (Ref range: 0.0 - 5.6 %) within past 6 months   # Obesity: Estimated body mass index is 31.79 kg/m  as calculated from the following:    Height as of this encounter: 1.753 m (5' 9.02\").    Weight as of this encounter: 97.7 kg (215 lb 6.4 oz).              Disposition Plan     Medically Ready for Discharge: Anticipated Tomorrow             Maxx Aguirre DO  Hospitalist Service, GOLD TEAM 11  [facility]  Securely message with [employer] (more info)  Text page via [employer] Paging/Directory   See signed in provider for up to date coverage information  ______________________________________________________________________    Interval History   Connor re-describes events to me. Emphasis on a sense of an impending episode, tingling then numbness in the hands and ultimately " feet, profuse sweating, lasting several minutes. First two events he lost consciousness briefly, then did not on the following two. He is recently on lorlatinib but denies other changes. This was in an air-conditioned indoor environment. He works in construction. Today he denies any symptoms at rest.    Physical Exam   Vital Signs: Temp: 97.9  F (36.6  C) Temp src: Oral BP: 104/61 Pulse: 63   Resp: 16 SpO2: 98 % O2 Device: None (Room air)    Weight: 215 lbs 6.4 oz    Gen: Awake and alert, appears comfortable, appears stated age.  HEENT: NCAT, sclerae anicteric.  CV: egular rhythm,  rate, S1/S2, extremities warm and well perfused, no lower extremity edema.  Pulm: Normal work of breathing, lungs clear to auscultation, no crackles or wheezing.  GI: Nontender, nondistended, soft. +bowel sounds.  Skin: Warm, dry, no jaundice.  Neuro: AO, speech normal, face symmetric,  and gross upper flexion/extension intact, hip and knee flexion extension +5/5 symmetric. EOMI, PERRL, tongue protrudes midlines, uvula midline. Sensation to light touch intact in feet, lower and upper leg lateral and medial, lower/upper arm laterally, and lower/upper face.  Psych: Mood is good, affect is congruent.      Medical Decision Making       50 MINUTES SPENT BY ME on the date of service doing chart review, history, exam, documentation & further activities per the note.      Data     I have personally reviewed the following data over the past 24 hrs:    11.2 (H)  \   14.1   / 180     138 102 20.5 (H) /  300 (H)   4.4 28 0.76 \     ALT: 18 AST: 16 AP: 135 TBILI: 0.3   ALB: 4.2 TOT PROTEIN: 6.7 LIPASE: N/A     Trop: 7 BNP: N/A     Procal: N/A CRP: N/A Lactic Acid: 1.1         Imaging results reviewed over the past 24 hrs:   Recent Results (from the past 24 hour(s))   Chest XR,  PA & LAT    Narrative    Chest 2 views    INDICATION: Syncope    COMPARISON: Chest abdomen pelvis CT 6/21/2024    FINDINGS: Heart size normal. Elevated right hemidiaphragm.  This is  similar to the recent CT. No consolidation.      Impression    IMPRESSION: Mildly elevated right hemidiaphragm unchanged from CT scan  last month.    WINSOME STEWARD MD         SYSTEM ID:  [id number]   CT Head w/o Contrast    Narrative    EXAM: CT HEAD W/O CONTRAST  7/23/2024 2:42 PM     HISTORY:  syncopal episodes, hx of metastatic brain lesions       COMPARISON:  Brain MRI 6/26/2024. Head CT 12/27/2023.    TECHNIQUE: Using multidetector thin collimation helical acquisition  technique, axial, coronal and sagittal CT images from the skull base  to the vertex were obtained without intravenous contrast.   (topogram) image(s) also obtained and reviewed.    FINDINGS:  Right frontal and high left parietal craniotomy changes again  demonstrated. Right frontal resection cavity with peripheral  calcification, unchanged. Punctate calcification anterior left frontal  lobe, also unchanged. Multiple scattered cerebral and cerebellar  metastases much better demonstrated on the prior MRI examination.    No acute intracranial hemorrhage, mass effect, or midline shift. No  acute loss of gray-white matter differentiation in the cerebral  hemispheres. Ventricles are proportionate to the cerebral sulci. Clear  basal cisterns.    The visualized portions of the paranasal sinuses and mastoid air cells  are clear. Grossly normal orbits.       Impression    IMPRESSION:  1. No acute intracranial pathology.   2. Multiple cerebral and cerebellar metastases much better  demonstrated on the recent prior MRI examination.    DENIZ BAILEY MD         SYSTEM ID:  [id number]      complains of pain/discomfort

## 2024-07-24 NOTE — PLAN OF CARE
"/68 (BP Location: Right arm)   Pulse 75   Temp 97.7  F (36.5  C) (Oral)   Resp 16   Ht 1.753 m (5' 9.02\")   Wt 98.5 kg (217 lb 2.5 oz)   SpO2 98%   BMI 32.05 kg/m       Neuro: A&Ox4.   Cardiac: On tele. SBP is soft and S.sergo  w/o symptom.    Respiratory: Sating >98% on RA.  GI/: Voided spontaneously. Last BM 7/23  Diet/appetite: Tolerating reg diet.   Activity:  Independent- ambulated to bathroom  Pain: Denies   Skin: No new deficits noted.  LDA's: L PIV- SL     Plan: Needs stool sample. VS q8hr. EEG will be 7/24. BS 4x/d before meals and HS. Continue with POC. Notify primary team with changes.       Problem: Adult Inpatient Plan of Care  Goal: Plan of Care Review  Description: The Plan of Care Review/Shift note should be completed every shift.  The Outcome Evaluation is a brief statement about your assessment that the patient is improving, declining, or no change.  This information will be displayed automatically on your shift  note.  Outcome: Progressing  Goal: Patient-Specific Goal (Individualized)  Description: You can add care plan individualizations to a care plan. Examples of Individualization might be:  \"Parent requests to be called daily at 9am for status\", \"I have a hard time hearing out of my right ear\", or \"Do not touch me to wake me up as it startles  me\".  Outcome: Progressing  Goal: Absence of Hospital-Acquired Illness or Injury  Outcome: Progressing  Intervention: Identify and Manage Fall Risk  Recent Flowsheet Documentation  Taken 7/23/2024 2125 by Yoselin Breen, RN  Safety Promotion/Fall Prevention:   patient and family education   room near nurse's station   room organization consistent   safety round/check completed  Intervention: Prevent Skin Injury  Recent Flowsheet Documentation  Taken 7/23/2024 2125 by Yoselin Breen RN  Body Position: position changed independently  Skin Protection: adhesive use limited  Device Skin Pressure Protection: adhesive use limited  Intervention: " Prevent Infection  Recent Flowsheet Documentation  Taken 7/23/2024 2125 by Yoselin Breen RN  Infection Prevention:   cohorting utilized   environmental surveillance performed   equipment surfaces disinfected   hand hygiene promoted   personal protective equipment utilized   rest/sleep promoted   single patient room provided   visitors restricted/screened  Goal: Optimal Comfort and Wellbeing  Outcome: Progressing  Goal: Readiness for Transition of Care  Outcome: Progressing  Flowsheets (Taken 7/23/2024 2100)  Transportation Anticipated: family or friend will provide  Intervention: Mutually Develop Transition Plan  Recent Flowsheet Documentation  Taken 7/23/2024 2144 by Yoselin Breen RN  Equipment Currently Used at Home: none  Taken 7/23/2024 2100 by Yoselin Breen RN  Transportation Anticipated: family or friend will provide  Patient/Family Anticipated Services at Transition: none  Patient/Family Anticipates Transition to: home with family     Problem: Stem Cell/Bone Marrow Transplant  Goal: Optimal Coping with Transplant  Outcome: Progressing  Goal: Symptom-Free Urinary Elimination  Outcome: Progressing  Goal: Diarrhea Symptom Control  Outcome: Progressing  Intervention: Manage Diarrhea  Recent Flowsheet Documentation  Taken 7/23/2024 2125 by Yoselin Breen RN  Skin Protection: adhesive use limited  Goal: Improved Activity Tolerance  Outcome: Progressing  Intervention: Promote Improved Energy  Recent Flowsheet Documentation  Taken 7/23/2024 2125 by Yoselin Breen RN  Activity Management:   activity adjusted per tolerance   ambulated to bathroom  Goal: Blood Counts Within Acceptable Range  Outcome: Progressing  Intervention: Monitor and Manage Hematologic Symptoms  Recent Flowsheet Documentation  Taken 7/23/2024 2125 by Yoselin Breen RN  Medication Review/Management: medications reviewed  Goal: Absence of Hypersensitivity Reaction  Outcome: Progressing  Goal: Absence of Infection  Outcome: Progressing  Intervention:  Prevent and Manage Infection  Recent Flowsheet Documentation  Taken 7/23/2024 2125 by Yoselin Breen RN  Infection Prevention:   cohorting utilized   environmental surveillance performed   equipment surfaces disinfected   hand hygiene promoted   personal protective equipment utilized   rest/sleep promoted   single patient room provided   visitors restricted/screened  Goal: Improved Oral Mucous Membrane Health and Integrity  Outcome: Progressing  Intervention: Promote Oral Comfort and Health  Recent Flowsheet Documentation  Taken 7/23/2024 2125 by Yoselin Breen RN  Oral Care: oral rinse provided  Goal: Nausea and Vomiting Symptom Relief  Outcome: Progressing  Goal: Optimal Nutrition Intake  Outcome: Progressing     Problem: Nausea and Vomiting  Goal: Nausea and Vomiting Relief  Outcome: Progressing  Intervention: Prevent and Manage Nausea and Vomiting  Recent Flowsheet Documentation  Taken 7/23/2024 2125 by Yoselin Breen RN  Oral Care: oral rinse provided     Problem: Fall Injury Risk  Goal: Absence of Fall and Fall-Related Injury  Outcome: Progressing  Intervention: Identify and Manage Contributors  Recent Flowsheet Documentation  Taken 7/23/2024 2125 by Yoselin Breen RN  Medication Review/Management: medications reviewed  Intervention: Promote Injury-Free Environment  Recent Flowsheet Documentation  Taken 7/23/2024 2125 by Yoselin Breen RN  Safety Promotion/Fall Prevention:   patient and family education   room near nurse's station   room organization consistent   safety round/check completed     Problem: Pain Acute  Goal: Optimal Pain Control and Function  Outcome: Progressing  Intervention: Prevent or Manage Pain  Recent Flowsheet Documentation  Taken 7/23/2024 2125 by Yoselin Breen RN  Medication Review/Management: medications reviewed     Problem: Infection  Goal: Absence of Infection Signs and Symptoms  Outcome: Progressing

## 2024-07-24 NOTE — H&P
[facility]    History and Physical - Hospitalist Service, GOLD TEAM        Date of Admission:  7/23/2024    Assessment & Plan      Connor Emerson is a 44 year old male with a history of seizures, brain metastases and tumor, metastatic lung cancer, malignant neoplasm of lower lobe of right lung, HTN, GERD, DM II, and chronic migraine who was admitted on 7/23/2024 for episodes of transient losses of conciousness. He reports 4 episodes in the past 2 weeks of prodromal symptoms with 2 culminating in LOC. Patient denies confusion following LOC. Patient recently started new chemotherapy 2 weeks prior, preceding the episodes.    # Transient loss of Consciousness with prodrome and without Postictal State  History presented in HPI. Neurology consulted in ED. Oncology to be consulted for question whether this is possible Loratinib side effect.  - Neuro Consult, Appreciate recs   - Overnight EEG to evaluate for seizure activity  - Check lactate given possible seizure  - Continue PTA Keppra 1000 mg BID  - Oncology Consult Placed  - Telemetry  - Orthostatic vitals    # Hyperglycemia  # DM ll  Patient's most recent A1C was 10.1 on 6/19/2024. On admission today his blood sugar is elevated to 462. Patient states that he had several sugary drinks prior to admission.  - Continue PTA insulin regiment  - Adding medium intensity sliding scale    # NSCLC  # Brain Metastases  # Chronic Pain  Recently started on new chemotherapeutic regiment, Loratinib that preceded syncopal episodes. Follows with palliative for symptom management.  - Consult Oncology  - Continue Lorlatinib  - Continue PTA flexeril  - Continue PTA Oxycodone  - Continue PTA Methadone  - Continue PTA Tylenol    # Leukocytosis  Pateint denies any infectious symptoms. Lactate wnl, and afebrile during admission. Also, picture is clouded by hx of malignancy and chemotherapy.  - AM CBC  - Continue to Monitor    # HTN  Patient's  "initial blood pressure was 112/76. Held home medications due to concerns for orthostatic hypotension. Awaiting for further vitals.  - Held PTA Hydralazine  - Held PTA Hydrochlorothiazide  - Held Propanolol  - Held Lisinopril    # History of Generalized Seizure (2022)  - Continue Keppra 1000 BID    # Hx of Provoked PE by Malignancy vs Bevacizumab (11/2022)  # Chronic Anticoagulation on Rivoraxaban   - Continue PTA Rivoraxaban     Diet:  Regular Diet  DVT Prophylaxis: DOAC  Heart Catheter: Not present  Fluids: None  Lines: None     Cardiac Monitoring: None  Code Status:  Full Code    Clinically Significant Risk Factors Present on Admission               # Drug Induced Coagulation Defect: home medication list includes an anticoagulant medication    # Hypertension: Noted on problem list            # DMII: A1C = 10.6 % (Ref range: 0.0 - 5.6 %) within past 6 months   # Obesity: Estimated body mass index is 32.05 kg/m  as calculated from the following:    Height as of 7/8/24: 1.753 m (5' 9\").    Weight as of 7/8/24: 98.4 kg (217 lb).              Disposition Plan      Expected Discharge Date: 07/27/2024                The patient's care was discussed with the Attending Physician, Dr. Acuña .      Vick Simms MD  Hospitalist Service, [facility]  Securely message with Vocera (more info)  Text page via [employer] Paging/Directory   See signed in provider for up to date coverage information  ______________________________________________________________________    Chief Complaint   Transient loss of consciousness    History is obtained from the patient    History of Present Illness   Connor Emerson is a 44 year old male who presents with 2 episodes of transient loss of consciousness. IN the past few weeks, after starting his new chemotherapeutic Lorlatinib, patient has experienced 2 episodes of transient LOC. He states that there has been a prodrome of symptoms " beforehand, including profuse sweating, and tingling sensations in his hands and feet. He denies any inciting action, and states that it has occurred at rest and with movement. He states that witnesses have seen him lose consciousness and that they reported he lost muscle tone, was unresponsive, and did not shake or move for approximately 1 minute. Afterwards he immediately regains full cognition and is aware of himself and his surroundings. In all, these instances last for 5 mins. His last instance happened prior to presentation on 7/23. He was sitting in his office chair when he experienced his aforementioned prodrome. He informed his boss, and then promptly lost consciousness. Afterwards, he decided to come to the ED. He states that he has also had two other instances of prodromes that did not result in LOC. There was nothing special or different about those moments in his memory. He denies fever, chills, coughing, SOB, changes in urine or bowel movements. He has been taking medication appropriately. Since his last A1C on 6/19 he has not changed his insulin regiment nor his eating habits.      Past Medical History    Past Medical History:   Diagnosis Date    Atypical chest pain 12/02/2013    Cancer (H)     Complication of anesthesia     Diabetes (H)     GERD (gastroesophageal reflux disease) 12/02/2013    History of pulmonary embolism     HTN (hypertension) 05/14/2012    HTN, goal below 140/90 07/02/2013    Insomnia 02/21/2012    Mediastinal lymphadenopathy     Metastasis to brain (H) 2022    Migraine headache 07/02/2013    Migraine with aura, without mention of intractable migraine without mention of status migrainosus     Pneumonia        Past Surgical History   Past Surgical History:   Procedure Laterality Date    BRONCHOSCOPY RIGID OR FLEXIBLE W/TRANSENDOSCOPIC ENDOBRONCHIAL ULTRASOUND GUIDED Bilateral 1/26/2022    Procedure: Right BRONCHOSCOPY, FIBEROPTIC, endobronchial ultrasound, pleural biopsy;  Surgeon:  Dallin Agrawal MD;  Location: [facility] OR    INJECT BLOCK MEDIAL BRANCH CERVICAL/THORACIC/LUMBAR      INSERT CHEST TUBE Right 2/16/2022    Procedure: INSERTION, CATHETER, INTERCOSTAL, FOR DRAINAGE;  Surgeon: Dallin Agrawal MD;  Location: [facility] GI    INSERT CHEST TUBE Right 3/9/2022    Procedure: INSERTION, CATHETER, INTERCOSTAL, FOR DRAINAGE;  Surgeon: Sushila Antonio MD;  Location: [facility] GI    IR CHEST TUBE REMOVAL TUNNELED RIGHT  4/2/2022    OPTICAL TRACKING SYSTEM CRANIOTOMY, EXCISE TUMOR, COMBINED Left 6/28/2022    Procedure: Left stealth craniotomy for tumor resection with motor mapping;  Surgeon: Stephen Moran MD;  Location:  OR    OPTICAL TRACKING SYSTEM CRANIOTOMY, EXCISE TUMOR, COMBINED Right 6/27/2023    Procedure: Right stealth craniotomy and resection of tumor;  Surgeon: Stephen Moran MD;  Location:  OR    ORTHOPEDIC SURGERY      Ganesh. Rotator cuff repair.    PLEUROSCOPY N/A 1/26/2022    Procedure: Pleuroscopy with Pleural Biopsy;  Surgeon: Dallin Agrawal MD;  Location: [facility] OR       Prior to Admission Medications   Prior to Admission Medications   Prescriptions Last Dose Informant Patient Reported? Taking?   Alcohol Swabs PADS  Spouse/Significant Other No No   Sig: Use to swab the area of the injection or jabier as directed. Per insurance coverage   Blood Glucose Monitoring Suppl (ACCU-CHEK GUIDE) w/Device KIT   Yes No   DULoxetine (CYMBALTA) 30 MG capsule   No No   Sig: Take 1 capsule (30 mg) by mouth 2 times daily   FLUoxetine (PROZAC) 20 MG capsule   No No   Sig: Take 1 capsule (20 mg) by mouth daily   acetaminophen (TYLENOL) 325 MG tablet   Yes No   Sig: Take 325-650 mg by mouth every 6 hours as needed for mild pain   albuterol (PROAIR HFA/PROVENTIL HFA/VENTOLIN HFA) 108 (90 Base) MCG/ACT inhaler  Spouse/Significant Other No No   Sig: Inhale 2 puffs into the lungs every 6 hours as needed for shortness of breath, wheezing or cough   baclofen (LIORESAL) 10 MG tablet   No No   Sig: Take 0.5-1  tablets (5-10 mg) by mouth 3 times daily as needed for other (hiccups)   blood glucose (NO BRAND SPECIFIED) lancets standard  Spouse/Significant Other No No   Sig: To use to test glucose level in the blood Use to test blood sugar  4  times daily as directed. To accompany glucose monitor brands per insurance coverage.   blood glucose (NO BRAND SPECIFIED) test strip  Spouse/Significant Other No No   Sig: To use to test glucose level in the blood Use to test blood sugar  4 times daily as directed. To accompany glucose monitor brands per insurance coverage.   hydrALAZINE (APRESOLINE) 50 MG tablet   No No   Sig: Take 1 tablet (50 mg) by mouth 2 times daily   hydrochlorothiazide (HYDRODIURIL) 50 MG tablet   No No   Sig: Take 1 tablet (50 mg) by mouth daily   insulin aspart (NOVOLOG PEN) 100 UNIT/ML pen  Spouse/Significant Other No No   Sig: Inject 0-40 Units Subcutaneous 3 times daily (before meals) Per discharge instructions sliding scale   insulin glargine (LANTUS PEN) 100 UNIT/ML pen   No No   Sig: Inject 70 Units Subcutaneous every morning   insulin pen needle (32G X 4 MM) 32G X 4 MM miscellaneous   No No   Sig: Use as directed by provider. Per insurance coverage   levETIRAcetam (KEPPRA) 1000 MG tablet   No No   Sig: Take 1 tablet (1,000 mg) by mouth 2 times daily   lisinopril (ZESTRIL) 40 MG tablet   No No   Sig: Take 1 tablet (40 mg) by mouth daily   lorlatinib (LORBRENA) 100 MG   No No   Sig: Take 1 tablet (100 mg) by mouth daily   methadone (DOLOPHINE) 5 MG tablet   No No   Sig: Take 1.5 tablets (7.5 mg) by mouth 2 times daily   methylphenidate (RITALIN) 5 MG tablet   No No   Sig: Take 1-2 tablets (5-10 mg) by mouth 2 times daily as needed (fatigue) Take second dose by 12 noon, if it is needed   naloxone (NARCAN) 4 MG/0.1ML nasal spray  Spouse/Significant Other No No   Sig: Spray 1 spray (4 mg) into one nostril alternating nostrils as needed for opioid reversal every 2-3 minutes until assistance arrives    oxyCODONE IR (ROXICODONE) 10 MG tablet   No No   Sig: Take 1 tablet (10 mg) by mouth every 3 hours as needed for moderate pain   prochlorperazine (COMPAZINE) 10 MG tablet   No No   Sig: Take 1 tablet (10 mg) by mouth every 6 hours as needed for nausea or vomiting   propranolol (INDERAL) 20 MG tablet   No No   Sig: Take 2 tablets (40 mg) by mouth 3 times daily   rivaroxaban ANTICOAGULANT (XARELTO) 20 MG TABS tablet   No No   Sig: Take 1 tablet (20 mg) by mouth daily (with dinner)   rosuvastatin (CRESTOR) 5 MG tablet   No No   Sig: Take 1 tablet (5 mg) by mouth daily      Facility-Administered Medications: None           Physical Exam   Vital Signs: Temp: 97.7  F (36.5  C) Temp src: Oral BP: 112/76 Pulse: 72   Resp: 18 SpO2: 93 % O2 Device: None (Room air)    Weight: 0 lbs 0 oz    Constitutional: awake, alert, cooperative, no apparent distress, and appears stated age  Respiratory: No increased work of breathing, good air exchange, clear to auscultation bilaterally, no crackles or wheezing  Cardiovascular: Regular rate and rhythm, normal S1 and S2, no S3 or S4, and no murmur noted    Medical Decision Making             Data

## 2024-07-24 NOTE — CONSULTS
"Care Management Initial Consult    General Information  Assessment completed with: Patient,    Type of CM/SW Visit: Initial Assessment    Primary Care Provider verified and updated as needed: Yes   Readmission within the last 30 days: no previous admission in last 30 days   Reason for Consult:  elevated risk score  Advance Care Planning: Advance Care Planning Reviewed: questions answered.  Provided patient with HCD form per his request.       Communication Assessment  (per nursing documentation)  Patient's communication style: spoken language (English or Bilingual)    Hearing Difficulty or Deaf: no   Wear Glasses or Blind: yes    Cognitive (per nursing documentation)  Cognitive/Neuro/Behavioral: WDL                      Living Environment:   People in home: spouse, child(elsa), dependent (Patient & wife have a special needs daughter.)     Current living Arrangements: apartment      Able to return to prior arrangements: yes       Family/Social Support:  Care provided by: self  Provides care for:  daughter  Marital Status:   Description of Support System: Wife; father lives in [place], but is involved & would come to [place] \"in a heartbeat\" per patient.  Patient's palliative doctor also supports him quite a bit with his mental health; Supportive, Involved    Support Assessment: Adequate family and caregiver support, Adequate social supports    Current Resources:   Patient receiving home care services: No     Community Resources: None  Equipment currently used at home: none  Supplies currently used at home: Diabetic Supplies    Employment/Financial:  Employment Status: employed full-time        Financial Concerns: none   Referral to Financial Worker: No       Does the patient's insurance plan have a 3 day qualifying hospital stay waiver?  Yes     Which insurance plan 3 day waiver is available? Health Plan - Payer Plan attributed to the [facility]     Will the waiver be used for post-acute " "placement? No    Lifestyle & Psychosocial Needs: (per nursing documentation)  Social Determinants of Health     Food Insecurity: Not on file   Depression: At risk (6/19/2024)    PHQ-2     PHQ-2 Score: 6   Housing Stability: Not on file   Tobacco Use: Low Risk  (7/8/2024)    Patient History     Smoking Tobacco Use: Never     Smokeless Tobacco Use: Never     Passive Exposure: Never   Financial Resource Strain: Not on file   Alcohol Use: Not on file   Transportation Needs: Not on file   Physical Activity: Not on file   Interpersonal Safety: Not on file   Stress: Not on file   Social Connections: Not on file   Health Literacy: Not on file       Functional Status:  Prior to admission patient needed assistance:   Dependent ADLs:: Independent  Dependent IADLs:: Independent       Mental Health Status:  Mental Health Status: Current Concern  Mental Health Management: Medication (also speaks with his palliative doctor about his mental health; patient states he has anxiety because \"having cancer isn't fun\".)    Chemical Dependency Status:  Chemical Dependency Status: No Current Concerns             Values/Beliefs:  Spiritual, Cultural Beliefs, Buddhism Practices, Values that affect care: no               Additional Information:  Per H&P:  \"Connor Emerson is a 44 year old male with a history of seizures, brain metastases and tumor, metastatic lung cancer, malignant neoplasm of lower lobe of right lung, HTN, GERD, DM II, and chronic migraine who was admitted on 7/23/2024 for episodes of transient losses of conciousness. He reports 4 episodes in the past 2 weeks of prodromal symptoms with 2 culminating in LOC. Patient denies confusion following LOC. Patient recently started new chemotherapy 2 weeks prior, preceding the episodes.\"          Initial assessment completed at bedside due to high risk readmission score. See details above. Care management will follow for any discharge needs.            Vanessa Cline, RN, BSN  RN " Care Coordinator    5A Medical Oncology/5C non-BMT  5A beds [id number]  5C beds [id number] (non-BMT)  [address] [address]  [email or web address]  Gender pronouns: she/her  Units: 5A Onc Vocera & 5C Vocera

## 2024-07-24 NOTE — PLAN OF CARE
"/74 (BP Location: Right arm)   Pulse 72   Temp 97.9  F (36.6  C) (Oral)   Resp 16   Ht 1.753 m (5' 9.02\")   Wt 97.7 kg (215 lb 6.4 oz)   SpO2 97%   BMI 31.79 kg/m      Neuro: A&Ox4.   Cardiac: SR on tele. VSS.   Respiratory: Sating >92% on RA.  GI/: Adequate urine output. Last BM PTA.  Diet/appetite: Tolerating regular diet. Eating well.  Activity:  Independent, up to chair and in halls.  Pain: At acceptable level on current regimen.   Skin: No new deficits noted.  LDA's: L hand PIV SL. Not flushing. Vascular access consulted.    Plan: On EEG. Monitor for seizures. Continue with POC. Notify primary team with changes.        "

## 2024-07-25 ENCOUNTER — APPOINTMENT (OUTPATIENT)
Dept: NEUROLOGY | Facility: CLINIC | Age: 44
End: 2024-07-25
Payer: COMMERCIAL

## 2024-07-25 VITALS
BODY MASS INDEX: 31.62 KG/M2 | RESPIRATION RATE: 18 BRPM | DIASTOLIC BLOOD PRESSURE: 89 MMHG | TEMPERATURE: 98.4 F | SYSTOLIC BLOOD PRESSURE: 133 MMHG | OXYGEN SATURATION: 98 % | HEIGHT: 69 IN | WEIGHT: 213.5 LBS | HEART RATE: 94 BPM

## 2024-07-25 LAB
GLUCOSE BLDC GLUCOMTR-MCNC: 276 MG/DL (ref 70–99)
GLUCOSE BLDC GLUCOMTR-MCNC: 414 MG/DL (ref 70–99)

## 2024-07-25 PROCEDURE — 250N000012 HC RX MED GY IP 250 OP 636 PS 637: Performed by: STUDENT IN AN ORGANIZED HEALTH CARE EDUCATION/TRAINING PROGRAM

## 2024-07-25 PROCEDURE — 250N000013 HC RX MED GY IP 250 OP 250 PS 637

## 2024-07-25 PROCEDURE — 36415 COLL VENOUS BLD VENIPUNCTURE: CPT | Performed by: PHYSICIAN ASSISTANT

## 2024-07-25 PROCEDURE — 99239 HOSP IP/OBS DSCHRG MGMT >30: CPT | Performed by: PEDIATRICS

## 2024-07-25 PROCEDURE — 95720 EEG PHY/QHP EA INCR W/VEEG: CPT | Performed by: PSYCHIATRY & NEUROLOGY

## 2024-07-25 PROCEDURE — 95714 VEEG EA 12-26 HR UNMNTR: CPT

## 2024-07-25 RX ADMIN — INSULIN GLARGINE 35 UNITS: 100 INJECTION, SOLUTION SUBCUTANEOUS at 08:31

## 2024-07-25 RX ADMIN — FLUOXETINE 20 MG: 20 CAPSULE ORAL at 08:21

## 2024-07-25 RX ADMIN — LEVETIRACETAM 1000 MG: 500 TABLET, FILM COATED ORAL at 08:21

## 2024-07-25 RX ADMIN — DULOXETINE HYDROCHLORIDE 30 MG: 30 CAPSULE, DELAYED RELEASE ORAL at 08:22

## 2024-07-25 RX ADMIN — INSULIN ASPART 6 UNITS: 100 INJECTION, SOLUTION INTRAVENOUS; SUBCUTANEOUS at 13:00

## 2024-07-25 RX ADMIN — INSULIN ASPART 3 UNITS: 100 INJECTION, SOLUTION INTRAVENOUS; SUBCUTANEOUS at 09:49

## 2024-07-25 RX ADMIN — METHADONE HYDROCHLORIDE 7.5 MG: 5 TABLET ORAL at 08:22

## 2024-07-25 ASSESSMENT — ACTIVITIES OF DAILY LIVING (ADL)
ADLS_ACUITY_SCORE: 20

## 2024-07-25 NOTE — DISCHARGE INSTRUCTIONS
Hold off on your blood pressure meds (lisinopril, hydrochlorothiazide, propranolol, and hydralazine) for the time being. Check your blood pressures a few time a day and try to make an early follow up with your primary care doctor or with Dr. Persaud for next week. They may re-introduce your blood pressure meds 1 at a time.

## 2024-07-25 NOTE — PROGRESS NOTES
[facility]    Medicine Progress Note - Hospitalist Service, GOLD TEAM 11    Date of Admission:  7/23/2024    Assessment & Plan   Connor Emerson is a 44 year old man with a history of seizure, NSCLC with metastasis to brain s/p craniotomy and resection, DM II, and migraine who was admitted on 7/23/2024 for episodic syncope and presyncope.    Transient loss of consciousness, suspect vasovagal due to low pressures (4 med regimen, all held while inpt) and brain mets  - neuro consulted, vEEG ongoing  - cardiac monitoring 48h  - Orthostatic vitals were normal: 117/88 lying down (hr 67) --> 130/88 sitting (HR 64) --> 136/101 standing (hr 83)  - CT pulmonary angiography rules out current PE  - Largely normal (grade 1 diastolic dysfunction only, normal valves) TTE 2022    History of Generalized Seizure (2022)  - Continue levetiracetam 1000mg BID    NSCLC complicated by Brain Metastases and Chronic cancer pain  Recently started on new chemotherapeutic regiment, Loratinib that preceded syncopal episodes. Follows with palliative for symptom management.  - appreciate Oncology input and will continue loratinib as they feel this is helping to manage metastatic disease  - Continue PTA flexeril, oxy, methadone, tylenol    Hyperglycemia due to DM ll  - Insulin glargine at reduced dose (35 units, from home 70 units) with medium intensity sliding scale  - AM fasting was 276 after 20 units NPH last night; first dose of lantus at 35 units was this morning, will wait to make changes until more data    HTN  Initial blood pressure was 112/76. Held home medications due to concerns for orthostatic hypotension. Awaiting further vitals.  - Held PTA Hydralazine, hydrochlorothiazide, propranolol, lisinopril; pressures haven't bounced back, may add back on one at a time outpatient    Hx of Provoked PE by Malignancy vs Bevacizumab (11/2022)  Chronic Anticoagulation on Rivoraxaban  - continue  DOAC      Diet: Combination Diet Regular Diet Adult    DVT Prophylaxis: DOAC  Heart Catheter: Not present  Lines: None     Cardiac Monitoring: None  Code Status: Full Code      Disposition Plan     Medically Ready for Discharge: Anticipated Tomorrow    Kole Hinojosa DO  Hospitalist Service, GOLD TEAM [address] [place] [address]  Securely message with [employer] (more info)  Text page via [employer] Paging/Directory   See signed in provider for up to date coverage information  ______________________________________________________________________    Interval History   Doing well this morning, no episodes, denies acute complaint.     Physical Exam   Vital Signs: Temp: 98.4  F (36.9  C) Temp src: Oral BP: 133/89 Pulse: 94   Resp: 18 SpO2: 98 % O2 Device: None (Room air)    Weight: 213 lbs 8 oz    Sitting up in no distress  Awake, alert  Speech is clear and coherent  RR and WOB are normal    MDM: high  See problem list above  Reviewed glucoses, BMP, lactate, CBC, CTA chest, initial impression vEEG, onc and neuro consult notes  high risk due to need for continuous vEEG monitoring to rule out seizures

## 2024-07-25 NOTE — PROGRESS NOTES
Oncology  Progress Note   Date of Service: 07/25/2024    Patient: Connor Emerson  MRN: [id number]  Admission Date: 7/23/2024  Hospital Day # 2  Cancer Diagnosis: Metastatic Lung Adenocarcinoma   Primary Outpatient Oncologist: Dr. Persaud   Current Treatment Plan: Lorlatinib      Assessment & Plan:   Connor Emerson is a 44 year old male with past medical history of seizures, HTN, GERD, T2DM, chronic migraine and metastatic lung adenocarcinoma with brain mets. Currently admitted with recurrent syncopal episodes. At this time, suspect that syncopal events likely related to vasovagal episodes vs cardiac etiology given prodrome described by patient.    Recommendations:   - Continue Lorlatinib while inpatient and syncope workup ongoing  - Agree with holding anti-hypertensive medications while inpatient  - Discussed with patient about keeping an accurate record of his blood pressure upon discharge  - Appreciate assistance from neurology in syncope workup  - Follow up NGS testing with [employer]    #Recurrent Syncopal Episodes   Patient has had ~4 syncopal episodes since starting the Lorlatinib. He has weakness and profuse sweating as a prodrome prior to the syncopal events. He has not identified any provoking events for his syncopal episodes. Has a blood pressure monitor at home but not monitoring his blood pressure. No obvious signs of seizure activity of post-ictal state after these events. Since admission, he has not had any episodes and overall feels well. Orthostatic vitals obtained and wnl. No seizure activity noted on vEEG overnight per brief neurology note. Suspect related to vasovagal events given prodrome vs cardiac.  - Agree with neurology consult and appreciate their assistance in management.  - Agree with telemetry      #Metastatic Adenocarcinoma of the Lung   In brief, patient presented to [facility] in January 2022 with progressive SOB and was found to have large right sided pleural effusion and cytology was  "positive for adenocarcinoma. He underwent a right pleural mass biopsy in January 2022 confirming diagnosis adenocarcinoma c/w lung primary. MR Brain with at least 9 intracranial mets for which he received GK to 12 lesions. He was started on Alectinib (3/2/22). He was found to have progression of brain mets in June 2022 and underwent left stealth craniotomy with resection of brain tumor, pathology showed radiation necrosis. He received Avastin for radiation necrosis starting in September 2022. Alectinib was on hold starting in December 2022.  He was started on Brigatinib in Feburary 2022. He underwent another right stealth craniotomy and resection of tumor June 2023. He most recently was started on Loralatinib.    Patient was seen and plan of care was discussed with attending physician Dr. Billingsley.     Thank you for the opportunity to partake in this patients plan of care. Please do not hesitate to page with questions. We will continue to follow.      Stephen Diggs MD  PGY-3, Internal Medicine - Pediatrics  ___________________________________________________________________     Subjective & Interval History:    Seen this morning and doing well. No additional episodes overnight. Walking around the unit without issues. No other acute concerns today.    Physical Exam:    Blood pressure 133/89, pulse 94, temperature 98.4  F (36.9  C), temperature source Oral, resp. rate 18, height 1.753 m (5' 9.02\"), weight 96.8 kg (213 lb 8 oz), SpO2 98%.  General: alert and cooperative, was seen walking around the unit  HEENT: sclera anicteric, EOMI, MMM  Resp: Normal respiratory effort on ambient air  GI: Appears non-distended  MSK: warm and well-perfused  Skin: no rashes on limited exam, no jaundice  Neuro: Alert and interactive, moves all extremities equally    Labs & Studies: I personally reviewed the following studies:  ROUTINE LABS (Last four results):  CMP  Recent Labs   Lab 07/25/24  0830 07/24/24  2301 07/24/24 2026 " 07/24/24  1709 07/24/24  0748 07/24/24  0658 07/23/24  2140 07/23/24  1334   NA  --   --   --   --   --  138  --  137   POTASSIUM  --   --   --   --   --  4.4  --  4.1   CHLORIDE  --   --   --   --   --  102  --  100   CO2  --   --   --   --   --  28  --  24   ANIONGAP  --   --   --   --   --  8  --  13   * 265* 310* 286*   < > 315*   < > 462*   BUN  --   --   --   --   --  20.5*  --  21.1*   CR  --   --   --   --   --  0.76  --  0.66*   GFRESTIMATED  --   --   --   --   --  >90  --  >90   TORY  --   --   --   --   --  9.0  --  9.8   MAG  --   --   --   --   --   --   --  2.0   PROTTOTAL  --   --   --   --   --   --   --  6.7   ALBUMIN  --   --   --   --   --   --   --  4.2   BILITOTAL  --   --   --   --   --   --   --  0.3   ALKPHOS  --   --   --   --   --   --   --  135   AST  --   --   --   --   --   --   --  16   ALT  --   --   --   --   --   --   --  18    < > = values in this interval not displayed.     CBC  Recent Labs   Lab 07/24/24  0658 07/23/24  1334   WBC 11.2* 17.5*   RBC 4.54 4.84   HGB 14.1 15.1   HCT 42.2 44.3   MCV 93 92   MCH 31.1 31.2   MCHC 33.4 34.1   RDW 13.4 13.3    288     INRNo lab results found in last 7 days.    IMAGING:  - No new imaging

## 2024-07-25 NOTE — PROVIDER NOTIFICATION
MD Nikita Beck notified of Insulin NPH delay d/t medication coming up later than due time. Checked BG before giving, BG still high at 310. Ok to give Insulin NPH per MD.

## 2024-07-25 NOTE — PROGRESS NOTES
"[facility]  Neurology Consultation - Progress Note    Patient Name:  Connor Emerson  Date of Service:  July 25, 2024    Subjective:    Last syncopal episode was (7/23) Prior to coming to the ED. Patient offers additional history today that be believes all 4 of his syncopal episodes have occurred in the morning before noon between 6:30AM and 11:00AM. He says he does not eat before 12 most days. Blood sugar checked by EMS after his most recent episode was above 200. He reports drinking about a gallon of water daily. He wonders if episodes are related to heat since he feels he was often outside immediately before or during them.     First two hours of EEG monitoring was without interictal epileptiform abnormalities and no electrographic seizures. Mild electrographic encephalopathy noted.    He said he walked around a bit yesterday but not very much. He said he would try to walk around more today than he did yesterday in attempt to provoke and episode.     Orthostatic vitals measured both 7/24 and 7/25 have been negative for orthostatic hypotension. Patient had some soft blood pressures after admission at 109/68 and 104/61. Medicine team held home BP medications of PTA hydralazine, PTA hydrochlorothiazide, propranolol, lisinopril on 7/24. BP over past 24 hours has ranged from [id number], Diastolic 73-88.    Cardiac telemetry on 7/24 was unremarkable. Most recent blood glucose was 276 this AM.    Heme/Onc recommends that he continue with Lorlatinib while we continue syncopal workup.     Objective:    Vitals: /88   Pulse 94   Temp 97.7  F (36.5  C) (Oral)   Resp 16   Ht 1.753 m (5' 9.02\")   Wt 96.8 kg (213 lb 8 oz)   SpO2 98%   BMI 31.51 kg/m    General: Sitting up in bed, NAD  Head: Atraumatic, normocephalic   Cardiac: no lower extremity edema, RRR  Neurologic:  Mental status: Awake, alert, attentive, oriented to self, time, place, and circumstance. Language is " fluent and coherent with intact comprehension of complex commands, naming and repetition.  Cranial nerves: VFF, PERRL, conjugate gaze, EOMI, facial sensation intact, face symmetric, shoulder shrug strong, tongue/uvula midline, no dysarthria.   Motor: Normal bulk and tone. No abnormal movements. 5/5 strength bilaterally in deltoids, biceps, triceps, hip flexors, hip extensors, knee flexion, knee extension, plantarflexion, dorsiflexion.   Reflexes: Normo-reflexic and symmetric biceps, brachioradialis, triceps, patellae, and achilles. No clonus, toes down-going.  Coordination: FNF without ataxia or dysmetria.    Gait: Deferred, but patient is comfortably ambulating      Pertinent Investigations:    I have personally reviewed most recent and pertinent labs, tests, and radiological images.     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 Has experienced 4 syncopal episodes within the past ~10 days. Last episode was  prior to coming to the ED. New medication lorlatinib within past two weeks.     Workup for syncopal episodes has shown negative orthostatic vitals and 24 hour EEG was negative for seizure, although notably a spell was not captured. Patient has also been on Cardiac Telemetry, which has also been unremarkable.    Need to also rule out cardiac cause for syncope and Lorlatinib side effect. Heme/Onc currently recommends continuation of Lorlatinib as we work up other etiologies for syncope.     After further history obtained today, suspect that patient most likely had recurrent vasovagal episodes due to polypharmacy due to his PTA HTN medications. He was on 4 different medications, which have all been held this  hospitalization and patient's BP appears to be normal off his blood pressure medications (average -130's / 70 - 80's). Furthermore, the fact that it appears these episodes have triggers such as always occurring in the morning and in a hot environment also points against seizures. In this setting, in addition to 24 hour EEG without epileptiform features (faster alpha beta activities most likely due to medications such as PTA selective serotonin reuptake inhibitor's), suspect seizures are less likely. Thus, ok to discontinue EEG.    Recommendations:   -Stop EEG   -Other syncope workup, blood pressure, and hyperglycemia management per primary team    -Neurology will sign off.    Thank you for involving Neurology in the care of Connor Emerson.  Please do not hesitate to call with questions/concerns (consult pager [phone]).      Patient was seen and discussed with Dr. Zelaya.    Lydia Dooley, MS3  Medical Student    Resident/Fellow Attestation   I, Maria De Jesus Dooley, was present with the medical/JULIO student who participated in the service and in the documentation of the note.  I have verified the history and personally performed the physical exam and medical decision making.  I agree with the assessment and plan of care as documented in the note.      Vandana Clancy MD  PGY-4 Neurology Resident

## 2024-07-25 NOTE — PROGRESS NOTES
Patient hospitalized for syncopal episodes. Cleared for discharge to home with spouse today per MD. Discharge instructions, medications, and follow-ups reviewed with patient in detail. No new discharge medications. Patient verbalized understanding of discharge instructions. Belongings were returned to patient at time of discharge, including patient's chemo medication. Patient provided with work letter. Spouse providing transport home.

## 2024-07-25 NOTE — DISCHARGE SUMMARY
[facility]  Hospitalist Discharge Summary      Date of Admission:  7/23/2024  Date of Discharge:  7/25/24  Discharging Provider: Kole Hinojosa,   Discharge Service: Hospitalist Service, GOLD TEAM 11    Discharge Diagnoses   Syncope  Vasovagal episodes  NSCLC with brain mets  Hyperglycemia due to T2DM  HTN    Follow-ups Needed After Discharge   Follow-up Appointments     Adult [facility] Follow-up and recommended labs and tests      Follow up with primary care provider, Bassam Persaud or Jessica Mohr,   within 7 days to evaluate medication change.  No follow up labs or test   are needed but please check your blood pressure several times a day and   record the results so you can share them with your doctor.    Appointments on [place] and/or [facility] (with [facility] or [facility]   provider or service). Call [phone] if you haven't heard regarding   these appointments within 7 days of discharge.        PCP to follow up on BP's    Unresulted Labs Ordered in the Past 30 Days of this Admission       Date and Time Order Name Status Description    7/24/2024 12:57 PM [id number] Response In process         These results will be followed up by oncologist    Discharge Disposition   Discharged to home  Condition at discharge: Stable    Hospital Course   Connor Emerson is a 44 year old man with a history of seizure, NSCLC with metastasis to brain s/p craniotomy and resection, DM II, and migraine who was admitted on 7/23/2024 for episodic syncope and presyncope.    Transient loss of consciousness, suspect vasovagal due to low pressures (4 med regimen, all held while inpt) and brain mets  Episodes preceded by vision change (sees stars), diaphoresis, numbness of hands/feet, occasional maintenance of consciousness. Neuro consulted, vEEG negative for seizures, patient hooked up to telemetry while admitted, orthostatic vitals were normal: 117/88 lying down (hr 67) --> 130/88 sitting  (HR 64) --> 136/101 standing (hr 83). CTA chest to rule out PE was negative. Onc did not feel loratinib was contributing to episodes, more likely BP regimen. Pressures remained relatively normal (130s/80s) while admitted with all meds held. Patient to check pressures multiple times a day at home and make PCP or onc follow up early next week for follow up at which time some meds may be resumed. No other med changes at this time.    BP meds held: hydrochlorothiazide, propranolol, hydralazine, lisinopril    Consultations This Hospital Stay   ONCOLOGY ADULT IP CONSULT  NURSING TO CONSULT FOR VASCULAR ACCESS CARE IP CONSULT  CARE MANAGEMENT / SOCIAL WORK IP CONSULT    Code Status   Full Code    Time Spent on this Encounter   IKole DO, personally saw the patient today and spent greater than 30 minutes discharging this patient.  ______________________________________________________________________    Physical Exam   Vital Signs: Temp: 98.4  F (36.9  C) Temp src: Oral BP: 133/89 Pulse: 94   Resp: 18 SpO2: 98 % O2 Device: None (Room air)    Weight: 213 lbs 8 oz    Sitting up in no distress  Awake, alert  Speech is clear and coherent  RR and WOB normal       Primary Care Physician   Bassam Persaud    Discharge Orders      Med Therapy Management Referral      Reason for your hospital stay    You were hospitalized for further investigation of episodes of losing consciousness. We determined this was most likely due to low blood pressures and have held your anti-hypertensives for the time being.     Activity    Your activity upon discharge: activity as tolerated     Adult [facility] Follow-up and recommended labs and tests    Follow up with primary care provider, Bassam Persaud or Jessica Mohr, within 7 days to evaluate medication change.  No follow up labs or test are needed but please check your blood pressure several times a day and record the results so you can share them with your doctor.    Appointments on  [place] and/or [facility] (with [facility] or [facility] provider or service). Call [phone] if you haven't heard regarding these appointments within 7 days of discharge.     Diet    Follow this diet upon discharge: Orders Placed This Encounter      Combination Diet Regular Diet Adult       Significant Results and Procedures   Most Recent 3 CBC's:  Recent Labs   Lab Test 07/24/24  0658 07/23/24  1334 07/10/24  1702   WBC 11.2* 17.5* 14.5*   HGB 14.1 15.1 15.0   MCV 93 92 90    288 260     Most Recent 3 BMP's:  Recent Labs   Lab Test 07/25/24  1235 07/25/24  0830 07/24/24  2301 07/24/24  0748 07/24/24  0658 07/23/24  2140 07/23/24  1334 07/10/24  1702   NA  --   --   --   --  138  --  137 140   POTASSIUM  --   --   --   --  4.4  --  4.1 3.9   CHLORIDE  --   --   --   --  102  --  100 100   CO2  --   --   --   --  28  --  24 29   BUN  --   --   --   --  20.5*  --  21.1* 30.8*   CR  --   --   --   --  0.76  --  0.66* 0.97   ANIONGAP  --   --   --   --  8  --  13 11   TORY  --   --   --   --  9.0  --  9.8 9.4   * 276* 265*   < > 315*   < > 462* 330*    < > = values in this interval not displayed.     Most Recent 2 LFT's:  Recent Labs   Lab Test 07/23/24  1334 07/10/24  1702   AST 16 20   ALT 18 18   ALKPHOS 135 96   BILITOTAL 0.3 0.4     Most Recent 3 INR's:  Recent Labs   Lab Test 06/27/23  1331 06/28/22  1109 04/02/22  1114   INR 0.95 0.90 1.03     Most Recent D-dimer:  Recent Labs   Lab Test 11/07/22  1531   DD 0.51*     7-Day Micro Results       No results found for the last 168 hours.          Most Recent ESR & CRP:  Recent Labs   Lab Test 01/09/23  1529 10/26/22  0832 04/05/22  0724   SED 6   < >  --    CRP  --   --  97.7*   CRPI <3.00   < >  --     < > = values in this interval not displayed.   ,   Results for orders placed or performed during the hospital encounter of 07/23/24   CT Head w/o Contrast    Narrative    EXAM: CT HEAD W/O CONTRAST  7/23/2024 2:42 PM     HISTORY:  syncopal episodes, hx of  metastatic brain lesions       COMPARISON:  Brain MRI 6/26/2024. Head CT 12/27/2023.    TECHNIQUE: Using multidetector thin collimation helical acquisition  technique, axial, coronal and sagittal CT images from the skull base  to the vertex were obtained without intravenous contrast.   (topogram) image(s) also obtained and reviewed.    FINDINGS:  Right frontal and high left parietal craniotomy changes again  demonstrated. Right frontal resection cavity with peripheral  calcification, unchanged. Punctate calcification anterior left frontal  lobe, also unchanged. Multiple scattered cerebral and cerebellar  metastases much better demonstrated on the prior MRI examination.    No acute intracranial hemorrhage, mass effect, or midline shift. No  acute loss of gray-white matter differentiation in the cerebral  hemispheres. Ventricles are proportionate to the cerebral sulci. Clear  basal cisterns.    The visualized portions of the paranasal sinuses and mastoid air cells  are clear. Grossly normal orbits.       Impression    IMPRESSION:  1. No acute intracranial pathology.   2. Multiple cerebral and cerebellar metastases much better  demonstrated on the recent prior MRI examination.    DENIZ BAILEY MD         SYSTEM ID:  [id number]   Chest XR,  PA & LAT    Narrative    Chest 2 views    INDICATION: Syncope    COMPARISON: Chest abdomen pelvis CT 6/21/2024    FINDINGS: Heart size normal. Elevated right hemidiaphragm. This is  similar to the recent CT. No consolidation.      Impression    IMPRESSION: Mildly elevated right hemidiaphragm unchanged from CT scan  last month.    WINSOME STEWARD MD         SYSTEM ID:  [id number]     *Note: Due to a large number of results and/or encounters for the requested time period, some results have not been displayed. A complete set of results can be found in Results Review.       Discharge Medications   Current Discharge Medication List        CONTINUE these medications which have NOT  CHANGED    Details   acetaminophen (TYLENOL) 325 MG tablet Take 325-650 mg by mouth every 6 hours as needed for mild pain      albuterol (PROAIR HFA/PROVENTIL HFA/VENTOLIN HFA) 108 (90 Base) MCG/ACT inhaler Inhale 2 puffs into the lungs every 6 hours as needed for shortness of breath, wheezing or cough  Qty: 18 g, Refills: 0    Comments: Pharmacy may dispense brand covered by insurance (Proair, or proventil or ventolin or generic albuterol inhaler)  Associated Diagnoses: Non-small cell lung cancer, unspecified laterality (H)      baclofen (LIORESAL) 10 MG tablet Take 0.5-1 tablets (5-10 mg) by mouth 3 times daily as needed for other (hiccups)  Qty: 60 tablet, Refills: 4    Associated Diagnoses: Hiccups; NSCLC metastatic to brain (H)      DULoxetine (CYMBALTA) 30 MG capsule Take 1 capsule (30 mg) by mouth 2 times daily  Qty: 60 capsule, Refills: 2    Associated Diagnoses: Malignant neoplasm of lower lobe of right lung (H); Anxiety      FLUoxetine (PROZAC) 20 MG capsule Take 1 capsule (20 mg) by mouth daily  Qty: 30 capsule, Refills: 1    Associated Diagnoses: Major depressive disorder, single episode, severe with psychotic features (H)      insulin aspart (NOVOLOG PEN) 100 UNIT/ML pen Inject 0-40 Units Subcutaneous 3 times daily (before meals) Per discharge instructions sliding scale  Qty: 45 mL, Refills: 2    Associated Diagnoses: Type 2 diabetes mellitus with hyperglycemia, with long-term current use of insulin (H)      insulin glargine (LANTUS PEN) 100 UNIT/ML pen Inject 70 Units Subcutaneous every morning  Qty: 15 mL, Refills: 4    Comments: If Lantus is not covered by insurance, may substitute Basaglar or Semglee or other insulin glargine product per insurance preference at same dose and frequency.    Associated Diagnoses: Type 2 diabetes mellitus without complication, with long-term current use of insulin (H)      levETIRAcetam (KEPPRA) 1000 MG tablet Take 1 tablet (1,000 mg) by mouth 2 times daily  Qty: 60  tablet, Refills: 11    Associated Diagnoses: Brain tumor (H)      lorlatinib (LORBRENA) 100 MG Take 1 tablet (100 mg) by mouth daily  Qty: 30 tablet, Refills: 0    Associated Diagnoses: Malignant neoplasm of lower lobe of right lung (H); Radiation therapy induced brain necrosis      methadone (DOLOPHINE) 5 MG tablet Take 1.5 tablets (7.5 mg) by mouth 2 times daily  Qty: 90 tablet, Refills: 0    Associated Diagnoses: Non-small cell lung cancer, unspecified laterality (H); Malignant neoplasm metastatic to brain (H); Cancer associated pain      methylphenidate (RITALIN) 5 MG tablet Take 1-2 tablets (5-10 mg) by mouth 2 times daily as needed (fatigue) Take second dose by 12 noon, if it is needed  Qty: 90 tablet, Refills: 0    Associated Diagnoses: Non-small cell lung cancer, unspecified laterality (H); Malignant neoplasm metastatic to brain (H); Neoplastic malignant related fatigue      naloxone (NARCAN) 4 MG/0.1ML nasal spray Spray 1 spray (4 mg) into one nostril alternating nostrils as needed for opioid reversal every 2-3 minutes until assistance arrives  Qty: 0.2 mL, Refills: 3    Associated Diagnoses: Cancer associated pain      oxyCODONE IR (ROXICODONE) 10 MG tablet Take 1 tablet (10 mg) by mouth every 3 hours as needed for moderate pain  Qty: 120 tablet, Refills: 0    Associated Diagnoses: Brain tumor (H); S/P craniotomy      prochlorperazine (COMPAZINE) 10 MG tablet Take 1 tablet (10 mg) by mouth every 6 hours as needed for nausea or vomiting  Qty: 30 tablet, Refills: 2    Associated Diagnoses: Malignant neoplasm of lower lobe of right lung (H); Radiation therapy induced brain necrosis      rivaroxaban ANTICOAGULANT (XARELTO) 20 MG TABS tablet Take 1 tablet (20 mg) by mouth daily (with dinner)  Qty: 90 tablet, Refills: 3    Associated Diagnoses: Pulmonary embolism, unspecified chronicity, unspecified pulmonary embolism type, unspecified whether acute cor pulmonale present (H)      rosuvastatin (CRESTOR) 5 MG  tablet Take 1 tablet (5 mg) by mouth daily  Qty: 90 tablet, Refills: 0    Associated Diagnoses: Hypertriglyceridemia      Alcohol Swabs PADS Use to swab the area of the injection or jabier as directed. Per insurance coverage  Qty: 100 each, Refills: 0    Associated Diagnoses: Type 2 diabetes mellitus with hyperglycemia, without long-term current use of insulin (H)      blood glucose (NO BRAND SPECIFIED) lancets standard To use to test glucose level in the blood Use to test blood sugar  4  times daily as directed. To accompany glucose monitor brands per insurance coverage.  Qty: 100 each, Refills: 3    Associated Diagnoses: Type 2 diabetes mellitus with hyperglycemia, without long-term current use of insulin (H)      blood glucose (NO BRAND SPECIFIED) test strip To use to test glucose level in the blood Use to test blood sugar  4 times daily as directed. To accompany glucose monitor brands per insurance coverage.  Qty: 100 strip, Refills: 3    Associated Diagnoses: Type 2 diabetes mellitus with hyperglycemia, without long-term current use of insulin (H)      Blood Glucose Monitoring Suppl (ACCU-CHEK GUIDE) w/Device KIT       insulin pen needle (32G X 4 MM) 32G X 4 MM miscellaneous Use as directed by provider. Per insurance coverage  Qty: 100 each, Refills: 0    Associated Diagnoses: Type 2 diabetes mellitus with hyperglycemia, without long-term current use of insulin (H)           STOP taking these medications       hydrALAZINE (APRESOLINE) 50 MG tablet Comments:   Reason for Stopping:         hydrochlorothiazide (HYDRODIURIL) 50 MG tablet Comments:   Reason for Stopping:         lisinopril (ZESTRIL) 40 MG tablet Comments:   Reason for Stopping:         propranolol (INDERAL) 20 MG tablet Comments:   Reason for Stopping:             Allergies   Allergies   Allergen Reactions    Vicodin [Hydrocodone-Acetaminophen] Nausea and Vomiting and GI Disturbance

## 2024-07-25 NOTE — PLAN OF CARE
"/73 (BP Location: Right arm)   Pulse 65   Temp 97.9  F (36.6  C) (Oral)   Resp 16   Ht 1.753 m (5' 9.02\")   Wt 97.7 kg (215 lb 6.4 oz)   SpO2 96%   BMI 31.79 kg/m      Neuro: A&Ox4.   Cardiac: Sinus sergo, within order parameters. VSS.   Respiratory: Sating >92% on RA.  GI/: Voiding spontaneously. BM x1 this shift  Diet/appetite: Regular diet.  Activity: Up ad avni.  Pain: Denied.  Skin: No new deficits noted.  LDA's: Left PIV - saline locked    Plan: No acute events overnight. No patient complaints. Continue with POC. Notify primary team with changes.    Problem: Adult Inpatient Plan of Care  Goal: Plan of Care Review  Description: The Plan of Care Review/Shift note should be completed every shift.  The Outcome Evaluation is a brief statement about your assessment that the patient is improving, declining, or no change.  This information will be displayed automatically on your shift  note.  Outcome: Progressing  Flowsheets (Taken 7/25/2024 0622)  Plan of Care Reviewed With: patient   Goal Outcome Evaluation:      Plan of Care Reviewed With: patient                 "

## 2024-07-27 ENCOUNTER — PATIENT OUTREACH (OUTPATIENT)
Dept: CARE COORDINATION | Facility: CLINIC | Age: 44
End: 2024-07-27
Payer: COMMERCIAL

## 2024-07-27 DIAGNOSIS — G62.9 PERIPHERAL POLYNEUROPATHY: Primary | ICD-10-CM

## 2024-07-27 NOTE — PROGRESS NOTES
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 (Red Flag symptoms escalate to triage hotline per guidelines): Improved  Do you know how to contact your clinic care team if you have future questions or changes to your health status? : Yes  Does the patient have their discharge instructions? : Yes  Does the patient have questions regarding their discharge instructions? : No  Were you started on any new medications or were there changes to any of your previous medications? : Yes  Does the patient have all of their medications?: Yes  Do you have questions regarding any of your medications? : No  Do you have all of your needed medical supplies or equipment (DME)?  (i.e. oxygen tank, CPAP, cane, etc.): Yes    Post-op ([facility] CTA Only)  If the patient had a surgery or procedure, do they have any questions for a nurse?: No         [facility] Explained and offered Care Coordination support to eligible patients: No    Patient accepted? No    Follow up Plan     Discharge Follow-Up  Discharge follow up appointment " scheduled in alignment with recommended follow up timeframe or Transitions of Risk Category? (Low = within 30 days; Moderate= within 14 days; High= within 7 days): Yes  Discharge Follow Up Appointment Date: 07/30/24  Discharge Follow Up Appointment Scheduled with?: Primary Care Provider    Future Appointments   Date Time Provider Department Center   7/30/2024  2:00 PM Jessica Mohr MD [facility]   7/31/2024  1:45 PM  [facility]   7/31/2024  2:30 PM Chelsea Villegas, CNP [facility]   8/1/2024  3:40 PM Ajith Brewer MD [facility]   8/5/2024  2:00 PM Vito Enamorado RPH [facility]       Outpatient Plan as outlined on AVS reviewed with patient.    For any urgent concerns, please contact our 24 hour nurse triage line: [phone] ([phone])       Kourtney Woo MA               O-T Advancement Flap Text: The defect edges were debeveled with a #15 scalpel blade.  Given the location of the defect, shape of the defect and the proximity to free margins an O-T advancement flap was deemed most appropriate.  Using a sterile surgical marker, an appropriate advancement flap was drawn incorporating the defect and placing the expected incisions within the relaxed skin tension lines where possible.    The area thus outlined was incised deep to adipose tissue with a #15 scalpel blade.  The skin margins were undermined to an appropriate distance in all directions utilizing iris scissors.

## 2024-07-29 ENCOUNTER — TELEPHONE (OUTPATIENT)
Dept: ONCOLOGY | Facility: CLINIC | Age: 44
End: 2024-07-29
Payer: COMMERCIAL

## 2024-07-29 NOTE — TELEPHONE ENCOUNTER
Oral Chemotherapy Monitoring Program    Subjective/Objective:  Connor Emerson is a 44 year old male contacted by phone for a follow-up visit for oral chemotherapy.  Connor confirms taking the appropriate dose of Lorbrena 100mg daily. Denies new or worsening side effects or missed doses. Pt was in the ER and hosptalized due to fainting spells that would occur in the morning. During his hospitalization they discontinued his hydrochlorothiazide, propranolol, hydralazine and lisinopril. He was on these meds when he had IV chemo.         5/17/2024    12:00 PM 6/11/2024     7:00 AM 6/11/2024    11:00 AM 7/9/2024     3:00 PM 7/9/2024     3:42 PM 7/12/2024     9:00 AM 7/29/2024    11:00 AM   ORAL CHEMOTHERAPY   Assessment Type Other Refill Lab Monitoring Discontinuation Initial Work up New Teach Initial Work up   Stop Date    7/8/2024      Reason for Discontinuation    Disease progression      Diagnosis Code Non-Small Cell Lung Cancer Non-Small Cell Lung Cancer Non-Small Cell Lung Cancer Non-Small Cell Lung Cancer Non-Small Cell Lung Cancer Non-Small Cell Lung Cancer Non-Small Cell Lung Cancer   Providers Dr Cori Persaud   [facility]   Is this patient followed by the [facility] OC team? No No No No No No No   Drug Name Alunbrig (brigatinib) Alunbrig (brigatinib) Alunbrig (brigatinib) Alunbrig (brigatinib) Lorbrena (lorlatinib) Lorbrena (lorlatinib) Lorbrena (lorlatinib)   Dose 120 mg 120 mg 120 mg 120 mg 100 mg 100 mg 100 mg   Current Schedule Daily Daily Daily Daily Daily Daily Daily   Cycle Details Continuous Continuous Drug on Hold Drug on Hold Continuous Continuous Continuous   Doses missed in last 2 weeks       0   Adherence Assessment       Adherent   Adverse Effects       No AE identified during assessment   Other (See Note for Details)   G3 lipase elevation       Pharmacist  "intervention(other)   Yes       Intervention(s)   Recommend drug hold       Any new drug interactions?     Yes     Pharmacist Intervention?     Yes     Intervention(s)     Patient Education     Is the dose as ordered appropriate for the patient?     Yes Yes Yes   Since the last time we talked, have you been hospitalized or used the emergency room?       Yes   What medical service did you use?       Emergency Room;Hospitalization   How many hospitalizations were related to the cancer medication?       0   How many emergency room visits?       1   How many emergency room visits were related to the cancer medication?       0       Last PHQ-2 Score on record:       6/19/2024     8:00 AM 12/22/2023     1:46 PM   PHQ-2 ( 1999 [employer])   Q1: Little interest or pleasure in doing things 3 0   Q2: Feeling down, depressed or hopeless 3 0   PHQ-2 Score 6 0   Q1: Little interest or pleasure in doing things Nearly every day    Q2: Feeling down, depressed or hopeless Nearly every day    PHQ-2 Score 6        Vitals:  BP:   BP Readings from Last 1 Encounters:   07/25/24 133/89     Wt Readings from Last 1 Encounters:   07/25/24 96.8 kg (213 lb 8 oz)     Estimated body surface area is 2.17 meters squared as calculated from the following:    Height as of 7/23/24: 1.753 m (5' 9.02\").    Weight as of 7/25/24: 96.8 kg (213 lb 8 oz).    Labs:  _  Result Component Current Result Ref Range   Sodium 138 (7/24/2024) 135 - 145 mmol/L     _  Result Component Current Result Ref Range   Potassium 4.4 (7/24/2024) 3.4 - 5.3 mmol/L     _  Result Component Current Result Ref Range   Calcium 9.0 (7/24/2024) 8.8 - 10.4 mg/dL     _  Result Component Current Result Ref Range   Magnesium 2.0 (7/23/2024) 1.7 - 2.3 mg/dL     _  Result Component Current Result Ref Range   Phosphorus 3.6 (7/10/2024) 2.5 - 4.5 mg/dL     _  Result Component Current Result Ref Range   Albumin 4.2 (7/23/2024) 3.5 - 5.2 g/dL     _  Result Component Current Result Ref Range   Urea " Nitrogen 20.5 (H) (7/24/2024) 6.0 - 20.0 mg/dL     _  Result Component Current Result Ref Range   Creatinine 0.76 (7/24/2024) 0.67 - 1.17 mg/dL     _  Result Component Current Result Ref Range   AST 16 (7/23/2024) 0 - 45 U/L     _  Result Component Current Result Ref Range   ALT 18 (7/23/2024) 0 - 70 U/L     _  Result Component Current Result Ref Range   Bilirubin Total 0.3 (7/23/2024) <=1.2 mg/dL     _  Result Component Current Result Ref Range   WBC Count 11.2 (H) (7/24/2024) 4.0 - 11.0 10e3/uL     _  Result Component Current Result Ref Range   Hemoglobin 14.1 (7/24/2024) 13.3 - 17.7 g/dL     _  Result Component Current Result Ref Range   Platelet Count 180 (7/24/2024) 150 - 450 10e3/uL     No results found for ANC within last 30 days.     _  Result Component Current Result Ref Range   Absolute Neutrophils 12.0 (H) (7/23/2024) 1.6 - 8.3 10e3/uL          Assessment/Plan:  Connor is tolerating the Lorbrena. Pt thinks his BP was elevated yesterday as he had a migraine headache. He's going to check his BP during his lunch break and I'll call him around 1:10pm today. Continue Lorbrena as planned.     Follow-Up:  7/31: appt/labs with Chelsea ChinchillaD  Hematology/Oncology Clinical Pharmacist  [facility]  [phone]    **The oral chemotherapy pharmacists are now working on provider-specific teams. Your pharmacist team can be reached at [id number] or [phone].**

## 2024-07-30 ENCOUNTER — OFFICE VISIT (OUTPATIENT)
Dept: FAMILY MEDICINE | Facility: CLINIC | Age: 44
End: 2024-07-30
Payer: COMMERCIAL

## 2024-07-30 ENCOUNTER — TELEPHONE (OUTPATIENT)
Dept: ONCOLOGY | Facility: CLINIC | Age: 44
End: 2024-07-30

## 2024-07-30 VITALS
SYSTOLIC BLOOD PRESSURE: 170 MMHG | RESPIRATION RATE: 24 BRPM | TEMPERATURE: 98 F | HEART RATE: 115 BPM | DIASTOLIC BLOOD PRESSURE: 121 MMHG | BODY MASS INDEX: 32.58 KG/M2 | HEIGHT: 69 IN | OXYGEN SATURATION: 96 % | WEIGHT: 220 LBS

## 2024-07-30 DIAGNOSIS — R55 SYNCOPE, UNSPECIFIED SYNCOPE TYPE: ICD-10-CM

## 2024-07-30 DIAGNOSIS — E11.65 TYPE 2 DIABETES MELLITUS WITH HYPERGLYCEMIA, WITHOUT LONG-TERM CURRENT USE OF INSULIN (H): ICD-10-CM

## 2024-07-30 DIAGNOSIS — I10 ESSENTIAL HYPERTENSION: Primary | ICD-10-CM

## 2024-07-30 PROCEDURE — 99495 TRANSJ CARE MGMT MOD F2F 14D: CPT | Performed by: GENERAL PRACTICE

## 2024-07-30 RX ORDER — LISINOPRIL 40 MG/1
40 TABLET ORAL DAILY
Qty: 30 TABLET | Refills: 1 | Status: SHIPPED | OUTPATIENT
Start: 2024-07-30

## 2024-07-30 RX ORDER — PROPRANOLOL HYDROCHLORIDE 20 MG/1
20 TABLET ORAL 2 TIMES DAILY
Qty: 60 TABLET | Refills: 1 | Status: SHIPPED | OUTPATIENT
Start: 2024-07-30

## 2024-07-30 ASSESSMENT — PAIN SCALES - GENERAL: PAINLEVEL: NO PAIN (0)

## 2024-07-30 ASSESSMENT — PATIENT HEALTH QUESTIONNAIRE - PHQ9
SUM OF ALL RESPONSES TO PHQ QUESTIONS 1-9: 12
10. IF YOU CHECKED OFF ANY PROBLEMS, HOW DIFFICULT HAVE THESE PROBLEMS MADE IT FOR YOU TO DO YOUR WORK, TAKE CARE OF THINGS AT HOME, OR GET ALONG WITH OTHER PEOPLE: NOT DIFFICULT AT ALL
SUM OF ALL RESPONSES TO PHQ QUESTIONS 1-9: 12

## 2024-07-30 NOTE — PATIENT INSTRUCTIONS
Start propranolol 20 mg twice daily.  Start lisinopril 40 mg daily  Check BMP in 2 weeks  Check home blood pressure 2 x per day for 7 days. Mychart results.    Start metformin 500 mg twice daily.  Start jardiance 10 mg daily for 30 days then increase to 25 mg daily if tolerated.     Follow-up in 4-6 weeks, virtual is ok for diabetes and hypertension.

## 2024-07-30 NOTE — TELEPHONE ENCOUNTER
I called to follow-up with Connor to get a BP reading. Pt is currently at a MD appt and stated his BP is currently 170/121. Since pt's BP is currently being addressed, I'll wait to review the provider notes tomorrow to see if any interventions were made and then communicate it with the oncology team.    Hamida Muñoz PharmD  Hematology/Oncology Clinical Pharmacist  [facility]  [phone]    **The oral chemotherapy pharmacists are now working on provider-specific teams. Your pharmacist team can be reached at [facility] or [phone].**

## 2024-07-30 NOTE — PROGRESS NOTES
"  Assessment & Plan     Essential hypertension  Elevated BP   Held antiHTN meds inpatient for low BP  Discussed resuming lisinopril, inderal, and titrate if needed.  - propranolol (INDERAL) 20 MG tablet; Take 1 tablet (20 mg) by mouth 2 times daily  - lisinopril (ZESTRIL) 40 MG tablet; Take 1 tablet (40 mg) by mouth daily  - Basic metabolic panel  (Ca, Cl, CO2, Creat, Gluc, K, Na, BUN); Future    Type 2 diabetes mellitus with hyperglycemia, without long-term current use of insulin (H)  Uncontrolled  Checking glucose 1 daily  Was told having more nodules in the the brain and has been difficult for him  - metFORMIN (GLUCOPHAGE) 500 MG tablet; Take 1 tablet (500 mg) by mouth 2 times daily (with meals)  - empagliflozin (JARDIANCE) 10 MG TABS tablet; Take 1 tablet (10 mg) by mouth daily  - empagliflozin (JARDIANCE) 25 MG TABS tablet; Take 1 tablet (25 mg) by mouth daily  - Hemoglobin A1c; Future, around 9/2024    Syncope, unspecified syncope type  No syncope since coming home        MED REC REQUIRED  Post Medication Reconciliation Status:     BMI  Estimated body mass index is 32.49 kg/m  as calculated from the following:    Height as of this encounter: 1.753 m (5' 9\").    Weight as of this encounter: 99.8 kg (220 lb).   Weight management plan: Discussed healthy diet and exercise guidelines    Depression Screening Follow Up        Follow Up Actions Taken  Crisis resource information provided in After Visit Summary  Seeing palliative            Subjective   Connor is a 44 year old, presenting for the following health issues:  Hospital F/U        7/30/2024     1:42 PM   Additional Questions   Roomed by Alecia Perez VF       Follow-up hospitalization for syncope    No more syncope since coming home    Taking long-acting insulin once daily and not using short-acting.     Following with palliative care.  Was told more tumors in the brain.             Hospital Follow-up Visit:    Hospital/Nursing Home/IP Rehab Facility: [facility]" [facility]  Date of Admission: 7/23/24  Date of Discharge: 7/25/24  Reason(s) for Admission: episodic syncope and presyncope.   Was the patient in the ICU or did the patient experience delirium during hospitalization?  No  Do you have any other stressors you would like to discuss with your provider? No    Problems taking medications regularly:  None  Medication changes since discharge: all bp meds taken off list (4 of them)  Problems adhering to non-medication therapy:  None    Summary of hospitalization:  [facility] discharge summary reviewed  Diagnostic Tests/Treatments reviewed.  Follow up needed: none  Other Healthcare Providers Involved in Patient s Care:         None  Update since discharge: improved.         Plan of care communicated with patient                 Review of Systems  Constitutional, neuro, ENT, endocrine, pulmonary, cardiac, gastrointestinal, genitourinary, musculoskeletal, integument and psychiatric systems are negative, except as otherwise noted.      Objective    BP (!) 170/121 (BP Location: Right arm, Patient Position: Sitting, Cuff Size: Adult Large)   Pulse 115   Temp 98  F (36.7  C) (Oral)   Resp 24   Ht 1.753 m (5' 9\")   Wt 99.8 kg (220 lb)   SpO2 96%   BMI 32.49 kg/m    Body mass index is 32.49 kg/m .  Physical Exam  Constitutional:       Appearance: Normal appearance.   Eyes:      Extraocular Movements: Extraocular movements intact.   Cardiovascular:      Rate and Rhythm: Normal rate and regular rhythm.   Pulmonary:      Effort: Pulmonary effort is normal.      Breath sounds: Normal breath sounds.   Abdominal:      Palpations: Abdomen is soft.   Musculoskeletal:         General: Normal range of motion.      Cervical back: Normal range of motion.   Skin:     General: Skin is warm.   Neurological:      General: No focal deficit present.      Mental Status: He is alert and oriented to person, place, and time. Mental status is at " baseline.   Psychiatric:         Mood and Affect: Mood normal.         Behavior: Behavior normal.         Thought Content: Thought content normal.         Judgment: Judgment normal.                    Signed Electronically by: Jessica Mohr MD    Answers submitted by the patient for this visit:  Patient Health Questionnaire (Submitted on 7/30/2024)  If you checked off any problems, how difficult have these problems made it for you to do your work, take care of things at home, or get along with other people?: Not difficult at all  PHQ9 TOTAL SCORE: 12

## 2024-07-31 ENCOUNTER — APPOINTMENT (OUTPATIENT)
Dept: LAB | Facility: CLINIC | Age: 44
End: 2024-07-31
Attending: STUDENT IN AN ORGANIZED HEALTH CARE EDUCATION/TRAINING PROGRAM
Payer: COMMERCIAL

## 2024-07-31 ENCOUNTER — ONCOLOGY VISIT (OUTPATIENT)
Dept: ONCOLOGY | Facility: CLINIC | Age: 44
End: 2024-07-31
Attending: NURSE PRACTITIONER
Payer: COMMERCIAL

## 2024-07-31 ENCOUNTER — DOCUMENTATION ONLY (OUTPATIENT)
Dept: ONCOLOGY | Facility: CLINIC | Age: 44
End: 2024-07-31

## 2024-07-31 VITALS
SYSTOLIC BLOOD PRESSURE: 159 MMHG | HEART RATE: 104 BPM | OXYGEN SATURATION: 94 % | BODY MASS INDEX: 32.49 KG/M2 | RESPIRATION RATE: 17 BRPM | WEIGHT: 220 LBS | TEMPERATURE: 99.3 F | DIASTOLIC BLOOD PRESSURE: 103 MMHG

## 2024-07-31 DIAGNOSIS — C79.31 MALIGNANT NEOPLASM METASTATIC TO BRAIN (H): ICD-10-CM

## 2024-07-31 DIAGNOSIS — C34.90 PRIMARY MALIGNANT NEOPLASM OF LUNG METASTATIC TO OTHER SITE, UNSPECIFIED LATERALITY (H): Primary | ICD-10-CM

## 2024-07-31 DIAGNOSIS — I10 HTN, GOAL BELOW 140/90: ICD-10-CM

## 2024-07-31 DIAGNOSIS — C34.31 MALIGNANT NEOPLASM OF LOWER LOBE OF RIGHT LUNG (H): ICD-10-CM

## 2024-07-31 DIAGNOSIS — Z79.899 ENCOUNTER FOR LONG-TERM (CURRENT) USE OF MEDICATIONS: ICD-10-CM

## 2024-07-31 LAB
ALBUMIN SERPL BCG-MCNC: 4.1 G/DL (ref 3.5–5.2)
ALP SERPL-CCNC: 106 U/L (ref 40–150)
ALT SERPL W P-5'-P-CCNC: 20 U/L (ref 0–70)
ANION GAP SERPL CALCULATED.3IONS-SCNC: 13 MMOL/L (ref 7–15)
AST SERPL W P-5'-P-CCNC: 17 U/L (ref 0–45)
BASOPHILS # BLD AUTO: 0 10E3/UL (ref 0–0.2)
BASOPHILS NFR BLD AUTO: 0 %
BILIRUB SERPL-MCNC: 0.2 MG/DL
BUN SERPL-MCNC: 13.3 MG/DL (ref 6–20)
CALCIUM SERPL-MCNC: 9.1 MG/DL (ref 8.8–10.4)
CHLORIDE SERPL-SCNC: 102 MMOL/L (ref 98–107)
CREAT SERPL-MCNC: 0.63 MG/DL (ref 0.67–1.17)
EGFRCR SERPLBLD CKD-EPI 2021: >90 ML/MIN/1.73M2
EOSINOPHIL # BLD AUTO: 0.5 10E3/UL (ref 0–0.7)
EOSINOPHIL NFR BLD AUTO: 5 %
ERYTHROCYTE [DISTWIDTH] IN BLOOD BY AUTOMATED COUNT: 13.2 % (ref 10–15)
GLUCOSE SERPL-MCNC: 453 MG/DL (ref 70–99)
HCO3 SERPL-SCNC: 25 MMOL/L (ref 22–29)
HCT VFR BLD AUTO: 41.6 % (ref 40–53)
HGB BLD-MCNC: 14.3 G/DL (ref 13.3–17.7)
IMM GRANULOCYTES # BLD: 0.1 10E3/UL
IMM GRANULOCYTES NFR BLD: 1 %
LYMPHOCYTES # BLD AUTO: 2.4 10E3/UL (ref 0.8–5.3)
LYMPHOCYTES NFR BLD AUTO: 22 %
MAGNESIUM SERPL-MCNC: 2 MG/DL (ref 1.7–2.3)
MCH RBC QN AUTO: 31 PG (ref 26.5–33)
MCHC RBC AUTO-ENTMCNC: 34.4 G/DL (ref 31.5–36.5)
MCV RBC AUTO: 90 FL (ref 78–100)
MONOCYTES # BLD AUTO: 0.9 10E3/UL (ref 0–1.3)
MONOCYTES NFR BLD AUTO: 9 %
NEUTROPHILS # BLD AUTO: 6.9 10E3/UL (ref 1.6–8.3)
NEUTROPHILS NFR BLD AUTO: 63 %
NRBC # BLD AUTO: 0 10E3/UL
NRBC BLD AUTO-RTO: 0 /100
PHOSPHATE SERPL-MCNC: 3 MG/DL (ref 2.5–4.5)
PLATELET # BLD AUTO: 178 10E3/UL (ref 150–450)
POTASSIUM SERPL-SCNC: 3.7 MMOL/L (ref 3.4–5.3)
PROT SERPL-MCNC: 6.4 G/DL (ref 6.4–8.3)
RBC # BLD AUTO: 4.62 10E6/UL (ref 4.4–5.9)
SODIUM SERPL-SCNC: 140 MMOL/L (ref 135–145)
WBC # BLD AUTO: 10.8 10E3/UL (ref 4–11)

## 2024-07-31 PROCEDURE — 36415 COLL VENOUS BLD VENIPUNCTURE: CPT | Performed by: NURSE PRACTITIONER

## 2024-07-31 PROCEDURE — 85004 AUTOMATED DIFF WBC COUNT: CPT | Performed by: NURSE PRACTITIONER

## 2024-07-31 PROCEDURE — 83735 ASSAY OF MAGNESIUM: CPT | Performed by: NURSE PRACTITIONER

## 2024-07-31 PROCEDURE — 84100 ASSAY OF PHOSPHORUS: CPT | Performed by: NURSE PRACTITIONER

## 2024-07-31 PROCEDURE — 99496 TRANSJ CARE MGMT HIGH F2F 7D: CPT | Performed by: NURSE PRACTITIONER

## 2024-07-31 PROCEDURE — 80053 COMPREHEN METABOLIC PANEL: CPT | Performed by: NURSE PRACTITIONER

## 2024-07-31 ASSESSMENT — PAIN SCALES - GENERAL: PAINLEVEL: NO PAIN (0)

## 2024-07-31 NOTE — NURSING NOTE
"Oncology Rooming Note    July 31, 2024 2:31 PM   Connor Emerson is a 44 year old male who presents for:    Chief Complaint   Patient presents with    Blood Draw     Labs drawn with  by RN. Vitals taken. Pt checked into next appointment.      Oncology Clinic Visit     Primary malignant neoplasm of lung metastatic to other site, unspecified laterality        Initial Vitals: BP (!) 159/103   Pulse 104   Temp 99.3  F (37.4  C) (Oral)   Resp 17   Wt 99.8 kg (220 lb)   SpO2 94%   BMI 32.49 kg/m   Estimated body mass index is 32.49 kg/m  as calculated from the following:    Height as of 7/30/24: 1.753 m (5' 9\").    Weight as of this encounter: 99.8 kg (220 lb). Body surface area is 2.2 meters squared.  No Pain (0) Comment: Data Unavailable   No LMP for male patient.  Allergies reviewed: Yes  Medications reviewed: Yes    Medications: MEDICATION REFILLS NEEDED TODAY. Provider was NOT notified.  Pharmacy name entered into EPIC:    [facility] [place] [place] [facility] [address] [facility] [place], [place] - [address] [place] [address]  [facility] [place] [id number] [place], [place] - [address] [place] [address] PHARMACY [place], [place] - [address] [facility] [phone]  [facility] - [place], [place] - [address] [facility] [address]    Frailty Screening:   Is the patient here for a new oncology consult visit in cancer care? 2. No      Clinical concerns: Pt needs a refill of the lorlatinib (LORBRENA) 100 MG        Jennifer Mack            "

## 2024-07-31 NOTE — PROGRESS NOTES
MEDICAL ONCOLOGY FOLLOW UP NOTE    PATIENT NAME: Connor Emerson  ENCOUNTER DATE:  July 31, 2024      Care Team  Primary Oncologist: Bassam Persaud MD    REASON FOR CURRENT VISIT: F/u of lung cancer    HISTORY OF PRESENT ILLNESS:  Mr. Connor Emerson is a 44 year old  male who is a non-smoker with PMHx of T2DM, HTN with metastatic NSCLC comes for follow up     Oncologic Hx:    Diagnosis:   Stage IV NSCLC, Rt lung adenocarcinoma with metastasis to pleura, mediastinum , rt pleural effusion and brain diagnosed 1/2022 (AJCC 8th edition)  PD-L1 TPS 2-3% by [place]   NGS [facility] panel-EML4:ALK rearragement  NGS Guardant- GNAS R201H, KRAS K5E- No ALK    Treatment:   7/10/24-current: Lolatinib 100 mg daily    2/23/2022- 6/2024: Brigatinib. Dose reduced to 60 mg due to cough after two days of 90 mg daily -->back on 90 mg---> increased to 180 mg  ---> settled on 120 mg    6/27/23- Right stealth craniotomy and resection of tumor:     7/20/23- 11/14/24: Bevacizumab for radiation necrosis. Discontinued due to severe HTN.    Past:  2/15/22- GK to 11 brain lesions  3/2/22- 12/2022 Alectinib 300 mg BID (Dose reduced to 450 mg BID from 600 mg BID due to grade 3 myalgias 3/21/22, again reduced to 300 mg BID 9/28/22)  6/28/22- Craniotomy, resection  9/28/22-10/26- Bevacizumab for radiation necrosis (stopped due to PE)      Intent of treatment: Palliative    Oncologic course:  1/19/22 to 1/22/22-Admitted to [facility] for 2 week progressive SOB secondary to have large rt sided pleural effusion, needing thoracentesis x2 (1.7L and 2.0 L removed), cytology positive for malignancy, adenocarcinoma.   1/26/22- Rt pleural mass biopsy-Dr. Agrawal--POSITIVE FOR ADENOCARCINOMA CONSISTENT WITH LUNG PRIMARY, admixed with mesothelial hyperplasia and inflammatory infiltrate (+ TTF-1 and CK 7;  negative  p40, calretinin and WT-1. PAX8 immunostain focal +). 4th thoracentesis done simultaneously - 3L approx removed.   2/1/22- PET/CT-Right lower lobe central  infiltrative FDG avid 8.2 x 9.6 cm mass representing a primary lung adenocarcinoma. Ipsilateral right perihilar, bilateral pretracheal, subcarinal and superior mediastinal michele metastases. Contralateral mildly FDG avid few lung nodules are suspicious for contralateral metastasis. At least 3 intracranial metastases in the right frontal lobe, left frontal lobe and left cerebellar hemisphere. Nonspecific mild diffuse bone marrow uptake. Further evaluation with a spine MRI could be considered to rule out early marrow infiltration. This could also be seen with red marrow conversion.  2/5/22-  Brain MRI- At least 9 intracranial metastases as detailed above. The dominant lesions involving the orbital right frontal lobe, the posterior left middle frontal gyrus, anterior right temporal lobe and in the left cerebellar hemisphere have surrounding moderate vasogenic type edema.  2/15/22- Saw Dr. Arango from [facility] Onc- Rcd GK to 12 lesion in bran  2/16/22- Pleurex placement   3/2/22- Started Alectinib 600 mg BID  3/21/22- Dose reduced to 450 mg BID due to grade 3 myalgias and fatigue  4/2/22 to 4/5/22- Admitted at [facility] for- Severe sepsis due to MSSA infection of right PleurX catheter s/p removal- He presented with onset of pain at tube site starting 4/1; at arrival was tachycardic with leukocytosis (22.7) and elevated lactic acid (2.9).  CT chest showed fluid and stranding tracking outside the pleural space into chest wall along pleural catheter.  IR was consulted and removed catheter 4/2 with report of pustular drainage and tip culture growing MSSA.  Thoracic Surg was consulted who felt no surgical indication necessary given minimal pleural fluid and lack of any signs of abscess.  Initially treated with broad spectrum coverage for sepsis, narrowed to Ancef once sensitivities returned with plan to transition to cefadroxil for an additional 10 days at discharge per ID. Held drug 4/2 to 4/11 5/2/22- CT CAP- Overall,  positive response to therapy with decreased size of right lower lobe and right pleural-based masses, pulmonary metastases, hilar and mediastinal lymphadenopathy. However, a single right posterior pleural-based mass has slightly increased in size since 2/24/2022. No metastatic disease in the abdomen and pelvis. Right Pleurx catheter has been removed. Trace right pleural effusion and right basilar atelectasis.  5/2/22- Brain MRI- The previously demonstrated brain metastases are mildly diminished in size versus to 2/5/2022. The degree of edema is also diminished but not completely resolved. Probable trace amounts of intralesional bleeding demonstrated on the gradient sequence within the metastases. No definite new metastasis or progressive mass effect. No hydrocephalus or infarct.    6/15/22 to 6/17/22- Admitted at [facility]-with aphasia and word finding difficulty over last few weeks.  He presented to [facility] ED on 6/10 for evaluation of his symptoms. MRI brain showed multiple intracranial metastases, with interval enlargement of the dominant lesion within the left frontal lobe and increased surrounding vasogenic edema with 2 mm rightward shift of the septum pellucidum. Due to his worsening anxiety, he left AMA. His symptoms continued to progress to where he could not write at work so he decided to go to the ED for re-evaluation and treatment. Evaluated by NSGY, Rad Onc (radiaiton necrosis vs tumor progression).  6/16/22- MR Brain (6/16) shows multiple intracranial metastases, with interval enlargement  of the dominant lesion within the left frontal lobe and increased surrounding vasogenic edema with 2 mm rightward shift of the septum pellucidum.  6/16/22- - CT CAP shows slightly decreased size of right lower lobe and right pleural-based masses. No new pulmonary nodules or lymphadenopathy; No evidence of metastatic disease in the abdomen or pelvis.   6/28/22 to 6/30/22- Admitted at [facility]- Elective left Stealth craniotomy  with resection of brain tumor due to ongoing symptoms. No intraoperative complications. EBL 50 ml.  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  7/5/22- Ct CAP- Right lower lobe low-density nodules are not significantly changed. A small left upper lobe pulmonary nodule is also unchanged. Trace pleural fluid on the right has increased slightly. No convincing evidence for metastatic disease in the abdomen or pelvis.  7/18/22 to 7/19/22- Admitted to [facility] for seizure- Reportedly was only taking once Keppra instead of twice daily. Also resumed on dexamethasone 2 mg daily  8/1/22- Brain MRI- Redemonstrated postsurgical changes status post left frontoparietal Craniotomy. Interval increase in size of the dominant ring-enhancing lesion in the left posterior superior frontal lobe with increased moderate surrounding vasogenic edema and local mass effect resulting in narrowing of the supratentorial ventricular system. No significant midline shift/herniation at this time    8/1/22- Dex was increased to 4 mg daily by Dr. Moran    9/1/22- CT Chest- Near resolution of previously seen right pleural nodule. Stable right lower lobe pulmonary nodule    9/28/22- Bevacizumab for radiation necrosis    10/26/22- Bevacizumab     10/26/22- Ct CAP- Stable posterior medial right lower lobe 1.9 x 1.1 cm nodule series 8 image 176. Adjacent stable scarring and atelectasis. The previously noted pleural nodule posteriorly on the right is not currently clearly identified. Stable left upper lobe 0.3 cm nodule image 56    10/26/22- Brain MRI- Overall improved appearance of multiple intracranial metastases with near resolution of associated edema and diminished enhancement and size of multiple residual lesions. No definite new or progressive metastasis.    11/7/22 to 11/9/22- Admitted for PE and HTN urgency- Small pulmonary embolism in the right lower lobe pulmonary artery. started on Lovenox, Brain MRI neg for PRES.    12/29/22- ED visit-  bilateral hip pain, pain in shoulders, knuckles, knees, and ankles- holding alectinib since 12/20/22 1/6/23- Ct CAP- Mild groundglass nodularity in the left upper lobe is new since the previous exam, and may be infectious in etiology. No other significant interval change. Pulmonary nodules are not significantly changed.    1/6/23- Brain MRI- Stable to diminished sequelae of intracranial metastasis and treatment changes. No new or progressive metastasis. No superimposed acute intracranial finding.     2/23/2022- Start Brigatinib. Dose reduced to 60 mg due to cough after two days of 90 mg daily -  3/23/23-Brigatinib  (increase to 90 mg)    4/20/23- CT CAP- Stable- Previously noted mild groundglass nodularity in the left upper lobe has resolved.Pulmonary nodules are unchanged.Trace amount pleural fluid on the right has decreased slightly.    4/20/23- Brain MRI- Possile progression- Largest metastasis within the right frontal lobe has increased in size with worsening peripheral nodular enhancement and worsening vasogenic edema contributing to new right to left midline shift.  Multiple new/enlarging metastases scattered throughout the cerebral hemispheres and cerebellum. Started on dexamethasone by NGS on 4/28/23 5/17/23- presents to clinic with glucose 627, admission to hospital for stability on insulin drip    6/16/23- Brain MRI- Interval increase in size of the necrotic lesion in the anterior aspect of the right frontal lobe from 2.4 x 2.3 x 2.4 cm previously to 3.0 x 3.5 x 2.8 cm on the current study. Mass effect due to slightly increased vasogenic edema surrounding the right frontal lesion resulted in increase of leftward midline shift of the anterior interhemispheric fissure from 0.7 cm previously to 0.9 cm currently. Interval increase in size in two adjacent metastases at the frontoparietal junction on the left. The remaining scattered intra-axial enhancing nodules are not changed appreciably in size or  appearance since the comparison study.No evidence for acute intracranial pathology.   Dr. Moran- Due to worsening headaches and more problems with walking. Recommended a right Stealth craniotomy for resection of the lesion.     6/27/23- Right stealth craniotomy and resection of tumor: Final path: radiation necrosis    7/10/23- Ct CAP- stable Stable exam without evidence for new disease.Stable pulmonary nodules including a dominant nodular opacity at the right lower lobe.    7/20/23- Bevacizumab for radiation necrosis    8/16/23- Bevacizumab     9/12/23- Ct CAP-  Stable right lower lobe dominant nodular opacity. No new disease in the chest, abdomen and pelvis.     9/15, 10/6, 10/27, 11/17-  Bevacizumab    10/25/23- MRI Brain- 1. Redemonstrated postoperative changes of right frontal craniotomy. Decreased size of the right frontal resection cavity with significant decrease in the surrounding right frontal lobe vasogenic edema seen on the previous study. Mass effect has essentially resolved in the interim and there is no longer any midline shift. In the interim, slightly increased thickness of a rind of peripheral nodular enhancement along the posterior inferior and medial aspects of the right frontal lobe resection cavity, indeterminate. There is no significant elevated cerebral blood volume in this area. The findings may represent evolving posttreatment changes; however, residual tumor is not excluded.  Stable postoperative changes status post left anterior parietal craniotomy with irregular enhancement in the left frontal lobe parenchyma underlying the resection cavity, likely due to posttreatment change. Other scattered supratentorial and infratentorial metastatic lesions are overall similar to slightly decreased in size, as described.    12/5/23- PET/CT- with sole site of disease in the RLL measuring 1.6 x 1.4 cm and with SUV max of 4.2. No other sites of disease. His case was reviewed at tumor board and he met with  Dr. Fu 12/14/24 with recommendations against surgical resection due to risk of significant pleural scarring. Continued on brigatinib 120 mg daily.     1/29/24 Brain MRI: No new metastatic lesions. No significant change in supratentorial and infratentorial subcentimeter metastatic enhancing lesions. Stable right inferior frontal and left high frontal postsurgical changes.     12/22-current: lost to follow up due to insurance issues    3/15/24- CT chest- Stable nodule medial right lower lobe. No metastatic disease demonstrated.    6/21/24- CT CAP- stable    6/26/24- Brain MRI- Numerous new and increased size of intracranial metastatic lesions. Increased nodular enhancement about the medial aspect of the right frontal lobe resection margin with increased adjacent T2/FLAIR hyperintense signal although without elevated cerebral blood volume could represent posttreatment changes although disease progression could have a similar appearance.  Stable left frontal lobe resection changes with stable underlying curvilinear enhancement.    7/10/24: start lorlatinib 100 mg daily    7/23/24-7/25/24: [facility] with syncopal episodes, suspected to be vasovagal due to low pressures      Interval Hx:  Seeing Connor in clinic. Discharged from hospital late last week. Feeling good since being home. Really has not noticed SE with lorlatinib. He reports since he started around 7/11 he has not missed a dose. He has not had worsening myalgias. He does still go to sleep early but this is feathering off--7:30 pm instead of 5:30.     Had a migraine Sunday--felt anxiety was exacerbating as he was home alone. Now resolved--no vision changes, nausea, imbalance, or syncopal etc.     Does not frequently check blood sugars at home, working with new PCP for insulin; reports compliance with this and was started on metformin by PCP too. Had fudge cookie prior to coming today so his blood sugar is high    ECOG PS 0-1    REVIEW OF SYSTEMS: 14 point ROS  negative other than the symptoms noted above in the HPI.    Wt Readings from Last 4 Encounters:   07/30/24 99.8 kg (220 lb)   07/25/24 96.8 kg (213 lb 8 oz)   07/08/24 98.4 kg (217 lb)   06/26/24 99.2 kg (218 lb 11.2 oz)      Review of Systems:  A comprehensive ROS was performed and found to be negative or non-contributory with the exception of that noted in the HPI above.    Past Medical History:  GERD  Hypertension, not on medication  Type 2 diabetes mellitus, not on medications currently, previously on Metformin    Past Surgical History:  Past Surgical History:   Procedure Laterality Date    BRONCHOSCOPY RIGID OR FLEXIBLE W/TRANSENDOSCOPIC ENDOBRONCHIAL ULTRASOUND GUIDED Bilateral 1/26/2022    Procedure: Right BRONCHOSCOPY, FIBEROPTIC, endobronchial ultrasound, pleural biopsy;  Surgeon: Dallin Agrawal MD;  Location: [facility] OR    INJECT BLOCK MEDIAL BRANCH CERVICAL/THORACIC/LUMBAR      INSERT CHEST TUBE Right 2/16/2022    Procedure: INSERTION, CATHETER, INTERCOSTAL, FOR DRAINAGE;  Surgeon: Dallin Agrawal MD;  Location: [facility] GI    INSERT CHEST TUBE Right 3/9/2022    Procedure: INSERTION, CATHETER, INTERCOSTAL, FOR DRAINAGE;  Surgeon: Sushila Antonio MD;  Location: [facility] GI    IR CHEST TUBE REMOVAL TUNNELED RIGHT  4/2/2022    OPTICAL TRACKING SYSTEM CRANIOTOMY, EXCISE TUMOR, COMBINED Left 6/28/2022    Procedure: Left stealth craniotomy for tumor resection with motor mapping;  Surgeon: Stephen Moran MD;  Location:  OR    OPTICAL TRACKING SYSTEM CRANIOTOMY, EXCISE TUMOR, COMBINED Right 6/27/2023    Procedure: Right stealth craniotomy and resection of tumor;  Surgeon: Stephen Moran MD;  Location:  OR    ORTHOPEDIC SURGERY      Ganesh. Rotator cuff repair.    PLEUROSCOPY N/A 1/26/2022    Procedure: Pleuroscopy with Pleural Biopsy;  Surgeon: Dallin Agrawal MD;  Location:  OR       Social History:  Lives with wife and 4 kids in [place]. Works as a  for an apartment complex in [facility] [facility]  [place]. Exposure to household chemicals and . No significant exposure to asbestos. No signal exposure to benzene or similar chemicals. No significant smoking history-states that he smoked 1 to 2 cigarettes occasionally per month for about 2 years in college, non-smoking since then. No significant alcohol use history. No other recreational substances. Good support system. Kids are 23, 19, 17 and 13.    Family History  Significant history for cancers on maternal side. Mother  of uterine cancer. 2 maternal uncles have possible metastatic melanoma.    Outpatient Medications:  Current Outpatient Medications   Medication Sig Dispense Refill    acetaminophen (TYLENOL) 325 MG tablet Take 325-650 mg by mouth every 6 hours as needed for mild pain      albuterol (PROAIR HFA/PROVENTIL HFA/VENTOLIN HFA) 108 (90 Base) MCG/ACT inhaler Inhale 2 puffs into the lungs every 6 hours as needed for shortness of breath, wheezing or cough 18 g 0    Alcohol Swabs PADS Use to swab the area of the injection or jabier as directed. Per insurance coverage 100 each 0    baclofen (LIORESAL) 10 MG tablet Take 0.5-1 tablets (5-10 mg) by mouth 3 times daily as needed for other (hiccups) 60 tablet 4    blood glucose (NO BRAND SPECIFIED) lancets standard To use to test glucose level in the blood Use to test blood sugar  4  times daily as directed. To accompany glucose monitor brands per insurance coverage. 100 each 3    blood glucose (NO BRAND SPECIFIED) test strip To use to test glucose level in the blood Use to test blood sugar  4 times daily as directed. To accompany glucose monitor brands per insurance coverage. 100 strip 3    Blood Glucose Monitoring Suppl (ACCU-CHEK GUIDE) w/Device KIT       DULoxetine (CYMBALTA) 30 MG capsule Take 1 capsule (30 mg) by mouth 2 times daily 60 capsule 2    empagliflozin (JARDIANCE) 10 MG TABS tablet Take 1 tablet (10 mg) by mouth daily 30 tablet 0    [START ON 2024] empagliflozin (JARDIANCE) 25 MG  TABS tablet Take 1 tablet (25 mg) by mouth daily 90 tablet 1    FLUoxetine (PROZAC) 20 MG capsule Take 1 capsule (20 mg) by mouth daily 30 capsule 1    insulin aspart (NOVOLOG PEN) 100 UNIT/ML pen Inject 0-40 Units Subcutaneous 3 times daily (before meals) Per discharge instructions sliding scale 45 mL 2    insulin glargine (LANTUS PEN) 100 UNIT/ML pen Inject 70 Units Subcutaneous every morning 15 mL 4    insulin pen needle (32G X 4 MM) 32G X 4 MM miscellaneous Use as directed by provider. Per insurance coverage 100 each 0    levETIRAcetam (KEPPRA) 1000 MG tablet Take 1 tablet (1,000 mg) by mouth 2 times daily 60 tablet 11    lisinopril (ZESTRIL) 40 MG tablet Take 1 tablet (40 mg) by mouth daily 30 tablet 1    lorlatinib (LORBRENA) 100 MG Take 1 tablet (100 mg) by mouth daily 30 tablet 0    metFORMIN (GLUCOPHAGE) 500 MG tablet Take 1 tablet (500 mg) by mouth 2 times daily (with meals) 60 tablet 3    methadone (DOLOPHINE) 5 MG tablet Take 1.5 tablets (7.5 mg) by mouth 2 times daily 90 tablet 0    methylphenidate (RITALIN) 5 MG tablet Take 1-2 tablets (5-10 mg) by mouth 2 times daily as needed (fatigue) Take second dose by 12 noon, if it is needed 90 tablet 0    naloxone (NARCAN) 4 MG/0.1ML nasal spray Spray 1 spray (4 mg) into one nostril alternating nostrils as needed for opioid reversal every 2-3 minutes until assistance arrives 0.2 mL 3    oxyCODONE IR (ROXICODONE) 10 MG tablet Take 1 tablet (10 mg) by mouth every 3 hours as needed for moderate pain 120 tablet 0    prochlorperazine (COMPAZINE) 10 MG tablet Take 1 tablet (10 mg) by mouth every 6 hours as needed for nausea or vomiting 30 tablet 2    propranolol (INDERAL) 20 MG tablet Take 1 tablet (20 mg) by mouth 2 times daily 60 tablet 1    rivaroxaban ANTICOAGULANT (XARELTO) 20 MG TABS tablet Take 1 tablet (20 mg) by mouth daily (with dinner) 90 tablet 3    rosuvastatin (CRESTOR) 5 MG tablet Take 1 tablet (5 mg) by mouth daily 90 tablet 0     No current  facility-administered medications for this visit.     There were no vitals taken for this visit.  General: No acute distress, no rashes on skin.   HEENT: Sclera anicteric. Oral mucosa pink and moist.  No mucositis or thrush  Heart: Regular, rate, and rhythm  Lungs: Clear to ascultation bilaterally. Breathing comfortably  Abdomen: Positive bowel sounds. Soft, non-distended, non-tender.   Extremities: no lower extremity edema  Neuro: Cranial nerves grossly intact      Labs & Studies: I personally reviewed the following studies:  Most Recent 3 CBC's:  Recent Labs   Lab Test 07/24/24  0658 07/23/24  1334 07/10/24  1702   WBC 11.2* 17.5* 14.5*   HGB 14.1 15.1 15.0   MCV 93 92 90    288 260     Most Recent 3 BMP's:  Recent Labs   Lab Test 07/25/24  1235 07/25/24  0830 07/24/24  2301 07/24/24  0748 07/24/24  0658 07/23/24  2140 07/23/24  1334 07/10/24  1702   NA  --   --   --   --  138  --  137 140   POTASSIUM  --   --   --   --  4.4  --  4.1 3.9   CHLORIDE  --   --   --   --  102  --  100 100   CO2  --   --   --   --  28  --  24 29   BUN  --   --   --   --  20.5*  --  21.1* 30.8*   CR  --   --   --   --  0.76  --  0.66* 0.97   ANIONGAP  --   --   --   --  8  --  13 11   TORY  --   --   --   --  9.0  --  9.8 9.4   * 276* 265*   < > 315*   < > 462* 330*    < > = values in this interval not displayed.    Most Recent 2 LFT's:  Recent Labs   Lab Test 07/23/24  1334 07/10/24  1702   AST 16 20   ALT 18 18   ALKPHOS 135 96   BILITOTAL 0.3 0.4    Most Recent TSH and T4:  Recent Labs   Lab Test 09/12/22  1642   TSH 2.56        ASSESSMENT AND PLAN:  Stage IV NSCLC, Rt lung adenocarcinoma with metastasis to pleura, mediastinum , rt pleural effusion and brain diagnosed 1/2022 (AJCC 8th edition)  PD-L1 TPS 2-3% by [place]   NGS [facility] panel-EML4:ALK rearragement; chr2:[id number], chr2:[id number]  NGS [facility]- GNAS R201H, KRAS K5E- No ALK    He began Alectinib 600 mg BID 3/2/22 and unfortunately developed grade 3 myalgias   requiring dose reduction to 300 mg BID.   In Dec 2022,we stopped drug 12/20/22 due to unremitting arthalgias, eventully had to starte PO steroids which led to improvement and resolved. Radiographicaly, he has had a near CR to Rx in the lung, has a residual rt LL lesion.     Began brigatinib 2/22/23 (delayed due to pt hesitancy). Developed severe cough on day 2 so brigatinib was held and cough resolved with in 24-48 hours. He restarted 60 mg daily and upped it to 90 mg.Restging CT showing stable disease in the lung, however, MRI with several contrast enhancement and increase in size of previously treated lesions. His dose was increased to 180 mg daily to address possible CNS disease progression and started on dexamethasone. 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 Restaging CT in 9/2023 showing overall disease stablity with no evidence of active cancer. 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  PET/CT after these three months showed oligoperisstent disease with a single focus of active malignancy in the RLL. Met with Thoracic surgery 12/2023 to discuss resection but they recommended against it due to risk for scarring after. 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 MOst recent CT showing stable disease but brain MRI showing several possible new lesions and enlargement of exissting lesion (see below).     Switched Rx to Lorlatinib due to good CNS penetration. Holding off on GK until we assess response.     Plan  Continue lorlatinib   Video with me 8/16  Labs and brain MRI in ~5 weeks  CT CAP q3 mo from last    #syncopal episodes: reviewed hospital notes with extensive work up. suspected to be vasovagal due to low pressures. No further episodes.     # Brain mets:   #  Radiation necrosis,   -Baseline Brain MRI with several brain mets, s/p GK to 11-12 brain mets. F/u Brain MRI in June 2022 was showing enlargement of the one of the lesions along with edema, therefore had to undergo craniotomy followed by resection, the final biopsy consistent with radiation necrosis.   -received two doses bevacizumab for radiation necrosis in Fall 2022, tolerated poorly with HTN urgency and PE.)   -MRI in 4/2023 now showing contrast enhancement of lesions and a dominate lesion in the R frontal region with edema, several other smaller lesion. Short term MRI showing increase in size of the rt frontal lesion, underwent resection, patho again showing radiation necrosis. Restarted bevacizumab, which resulted in improved radiation necrosis / vasogenic edema on imaging in 10/2023 but ultimately was stopped due to uncontrolled HTN, last dose being 11/2023  -MRI imaging 1/2024 with stable disease and no edema.  -Brain MRI 3/2024 cancelled due to lapse in medical insurance.  -Most recent MRI 6/2024 showing showing several possible new lesions and enlargement of exissting lesion, however I am unsure if this is due to better imaging (perfusion MRI this time). Reviewed with rad onc and elected for short term MRI scan following initiation of lorlatinib, hold off on GK.    #HTN: anti-HTN agents held due to recent hospitalization as over-correction was felt to be contributing to syncopal episodes. Now with rebound HTN--was started on two BP agents yesterday but his wife is picking up today so he has not started. He is borderline where we would hold lorlatinib, but given he will have BP meds at home to start this evening, we will continue lorlatinib. We again reviewed the risks of ongoing uncontrolled blood pressure.     #hypercholesteremia   Continue rosuvastatin 5 mg; confirmed he is taking    #Right pleuritic/chest pain  Felt to be 2/2 prior pleurex drain. No acute resp changes today. Pain now improving on  increased dose of methadone and prn oxy  -On methadone and oxycodone; followed by Palliative Care a     #Elevated Lipase  -mild elevation. No concern on exam    #Diabetes, Type 2  Hyperglycemia on labs. Compliant with insulin but not very adherent to diet. Started on metformin recently.     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 Stable now for some time      Transition Care Management Services  Interactive contact date: 7/27/2024  Face-to-face visit date: 7/31/2024        Medical complexity decision making: High complexity ([id number])      50 minutes spent on the date of the encounter doing chart review, review of test results, interpretation of tests, patient visit, and documentation     Chelsea Villegas, CNP on 7/31/2024 at 3:19 PM

## 2024-07-31 NOTE — PROGRESS NOTES
Reviewed visit with Chelsea today. Per IB request, we'll contact the pt Friday to get a BP reading. Blood glucose remains high, Chelsea aware. Pt had a fudge cookie before his labs.    We will contact the pt Friday for BP reading.    Hamida Muñoz PharmD  Hematology/Oncology Clinical Pharmacist  [facility]  [phone]    **The oral chemotherapy pharmacists are now working on provider-specific teams. Your pharmacist team can be reached at [facility] H-[id number] or [phone].**

## 2024-07-31 NOTE — LETTER
7/31/2024      Connor Emerson  [address]  [facility] [address]      Dear Colleague,    Thank you for referring your patient, Connor Emerson, to the [facility]. Please see a copy of my visit note below.        MEDICAL ONCOLOGY FOLLOW UP NOTE    PATIENT NAME: Connor Emerson  ENCOUNTER DATE:  July 31, 2024      Care Team  Primary Oncologist: Bassam Persaud MD    REASON FOR CURRENT VISIT: F/u of lung cancer    HISTORY OF PRESENT ILLNESS:  Mr. Connor Emerson is a 44 year old  male who is a non-smoker with PMHx of T2DM, HTN with metastatic NSCLC comes for follow up     Oncologic Hx:    Diagnosis:   Stage IV NSCLC, Rt lung adenocarcinoma with metastasis to pleura, mediastinum , rt pleural effusion and brain diagnosed 1/2022 (AJCC 8th edition)  PD-L1 TPS 2-3% by [facility]   NGS [facility] panel-EML4:ALK rearragement  NGS Guardant- GNAS R201H, KRAS K5E- No ALK    Treatment:   7/10/24-current: Lolatinib 100 mg daily    2/23/2022- 6/2024: Brigatinib. Dose reduced to 60 mg due to cough after two days of 90 mg daily -->back on 90 mg---> increased to 180 mg  ---> settled on 120 mg    6/27/23- Right stealth craniotomy and resection of tumor:     7/20/23- 11/14/24: Bevacizumab for radiation necrosis. Discontinued due to severe HTN.    Past:  2/15/22- GK to 11 brain lesions  3/2/22- 12/2022 Alectinib 300 mg BID (Dose reduced to 450 mg BID from 600 mg BID due to grade 3 myalgias 3/21/22, again reduced to 300 mg BID 9/28/22)  6/28/22- Craniotomy, resection  9/28/22-10/26- Bevacizumab for radiation necrosis (stopped due to PE)      Intent of treatment: Palliative    Oncologic course:  1/19/22 to 1/22/22-Admitted to [facility] for 2 week progressive SOB secondary to have large rt sided pleural effusion, needing thoracentesis x2 (1.7L and 2.0 L removed), cytology positive for malignancy, adenocarcinoma.   1/26/22- Rt pleural mass biopsy-Dr. Agrawal--POSITIVE FOR ADENOCARCINOMA CONSISTENT WITH LUNG PRIMARY,  admixed with mesothelial hyperplasia and inflammatory infiltrate (+ TTF-1 and CK 7;  negative  p40, calretinin and WT-1. PAX8 immunostain focal +). 4th thoracentesis done simultaneously - 3L approx removed.   2/1/22- PET/CT-Right lower lobe central infiltrative FDG avid 8.2 x 9.6 cm mass representing a primary lung adenocarcinoma. Ipsilateral right perihilar, bilateral pretracheal, subcarinal and superior mediastinal michele metastases. Contralateral mildly FDG avid few lung nodules are suspicious for contralateral metastasis. At least 3 intracranial metastases in the right frontal lobe, left frontal lobe and left cerebellar hemisphere. Nonspecific mild diffuse bone marrow uptake. Further evaluation with a spine MRI could be considered to rule out early marrow infiltration. This could also be seen with red marrow conversion.  2/5/22-  Brain MRI- At least 9 intracranial metastases as detailed above. The dominant lesions involving the orbital right frontal lobe, the posterior left middle frontal gyrus, anterior right temporal lobe and in the left cerebellar hemisphere have surrounding moderate vasogenic type edema.  2/15/22- Saw Dr. Arango from Rad Onc- Rcd GK to 12 lesion in bran  2/16/22- Pleurex placement   3/2/22- Started Alectinib 600 mg BID  3/21/22- Dose reduced to 450 mg BID due to grade 3 myalgias and fatigue  4/2/22 to 4/5/22- Admitted at [facility] for- Severe sepsis due to MSSA infection of right PleurX catheter s/p removal- He presented with onset of pain at tube site starting 4/1; at arrival was tachycardic with leukocytosis (22.7) and elevated lactic acid (2.9).  CT chest showed fluid and stranding tracking outside the pleural space into chest wall along pleural catheter.  IR was consulted and removed catheter 4/2 with report of pustular drainage and tip culture growing MSSA.  Thoracic Surg was consulted who felt no surgical indication necessary given minimal pleural fluid and lack of any signs of  abscess.  Initially treated with broad spectrum coverage for sepsis, narrowed to Ancef once sensitivities returned with plan to transition to cefadroxil for an additional 10 days at discharge per ID. Held drug 4/2 to 4/11 5/2/22- CT CAP- Overall, positive response to therapy with decreased size of right lower lobe and right pleural-based masses, pulmonary metastases, hilar and mediastinal lymphadenopathy. However, a single right posterior pleural-based mass has slightly increased in size since 2/24/2022. No metastatic disease in the abdomen and pelvis. Right Pleurx catheter has been removed. Trace right pleural effusion and right basilar atelectasis.  5/2/22- Brain MRI- The previously demonstrated brain metastases are mildly diminished in size versus to 2/5/2022. The degree of edema is also diminished but not completely resolved. Probable trace amounts of intralesional bleeding demonstrated on the gradient sequence within the metastases. No definite new metastasis or progressive mass effect. No hydrocephalus or infarct.    6/15/22 to 6/17/22- Admitted at [facility]-with aphasia and word finding difficulty over last few weeks.  He presented to [facility] ED on 6/10 for evaluation of his symptoms. MRI brain showed multiple intracranial metastases, with interval enlargement of the dominant lesion within the left frontal lobe and increased surrounding vasogenic edema with 2 mm rightward shift of the septum pellucidum. Due to his worsening anxiety, he left AMA. His symptoms continued to progress to where he could not write at work so he decided to go to the ED for re-evaluation and treatment. Evaluated by NSGY, Rad Onc (radiaiton necrosis vs tumor progression).  6/16/22- MR Brain (6/16) shows multiple intracranial metastases, with interval enlargement  of the dominant lesion within the left frontal lobe and increased surrounding vasogenic edema with 2 mm rightward shift of the septum pellucidum.  6/16/22- - CT CAP shows slightly  decreased size of right lower lobe and right pleural-based masses. No new pulmonary nodules or lymphadenopathy; No evidence of metastatic disease in the abdomen or pelvis.   6/28/22 to 6/30/22- Admitted at [facility]- Elective left Stealth craniotomy with resection of brain tumor due to ongoing symptoms. No intraoperative complications. EBL 50 ml.  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  7/5/22- Ct CAP- Right lower lobe low-density nodules are not significantly changed. A small left upper lobe pulmonary nodule is also unchanged. Trace pleural fluid on the right has increased slightly. No convincing evidence for metastatic disease in the abdomen or pelvis.  7/18/22 to 7/19/22- Admitted to [facility] for seizure- Reportedly was only taking once Keppra instead of twice daily. Also resumed on dexamethasone 2 mg daily  8/1/22- Brain MRI- Redemonstrated postsurgical changes status post left frontoparietal Craniotomy. Interval increase in size of the dominant ring-enhancing lesion in the left posterior superior frontal lobe with increased moderate surrounding vasogenic edema and local mass effect resulting in narrowing of the supratentorial ventricular system. No significant midline shift/herniation at this time    8/1/22- Dex was increased to 4 mg daily by Dr. Moran    9/1/22- CT Chest- Near resolution of previously seen right pleural nodule. Stable right lower lobe pulmonary nodule    9/28/22- Bevacizumab for radiation necrosis    10/26/22- Bevacizumab     10/26/22- Ct CAP- Stable posterior medial right lower lobe 1.9 x 1.1 cm nodule series 8 image 176. Adjacent stable scarring and atelectasis. The previously noted pleural nodule posteriorly on the right is not currently clearly identified. Stable left upper lobe 0.3 cm nodule image 56    10/26/22- Brain MRI- Overall improved appearance of multiple intracranial metastases with near resolution of associated edema and diminished enhancement and size of multiple residual  lesions. No definite new or progressive metastasis.    11/7/22 to 11/9/22- Admitted for PE and HTN urgency- Small pulmonary embolism in the right lower lobe pulmonary artery. started on Lovenox, Brain MRI neg for PRES.    12/29/22- ED visit- bilateral hip pain, pain in shoulders, knuckles, knees, and ankles- holding alectinib since 12/20/22 1/6/23- Ct CAP- Mild groundglass nodularity in the left upper lobe is new since the previous exam, and may be infectious in etiology. No other significant interval change. Pulmonary nodules are not significantly changed.    1/6/23- Brain MRI- Stable to diminished sequelae of intracranial metastasis and treatment changes. No new or progressive metastasis. No superimposed acute intracranial finding.     2/23/2022- Start Brigatinib. Dose reduced to 60 mg due to cough after two days of 90 mg daily -  3/23/23-Brigatinib  (increase to 90 mg)    4/20/23- CT CAP- Stable- Previously noted mild groundglass nodularity in the left upper lobe has resolved.Pulmonary nodules are unchanged.Trace amount pleural fluid on the right has decreased slightly.    4/20/23- Brain MRI- Possile progression- Largest metastasis within the right frontal lobe has increased in size with worsening peripheral nodular enhancement and worsening vasogenic edema contributing to new right to left midline shift.  Multiple new/enlarging metastases scattered throughout the cerebral hemispheres and cerebellum. Started on dexamethasone by NGS on 4/28/23 5/17/23- presents to clinic with glucose 627, admission to hospital for stability on insulin drip    6/16/23- Brain MRI- Interval increase in size of the necrotic lesion in the anterior aspect of the right frontal lobe from 2.4 x 2.3 x 2.4 cm previously to 3.0 x 3.5 x 2.8 cm on the current study. Mass effect due to slightly increased vasogenic edema surrounding the right frontal lesion resulted in increase of leftward midline shift of the anterior interhemispheric  fissure from 0.7 cm previously to 0.9 cm currently. Interval increase in size in two adjacent metastases at the frontoparietal junction on the left. The remaining scattered intra-axial enhancing nodules are not changed appreciably in size or appearance since the comparison study.No evidence for acute intracranial pathology.   Dr. Moran- Due to worsening headaches and more problems with walking. Recommended a right Stealth craniotomy for resection of the lesion.     6/27/23- Right stealth craniotomy and resection of tumor: Final path: radiation necrosis    7/10/23- Ct CAP- stable Stable exam without evidence for new disease.Stable pulmonary nodules including a dominant nodular opacity at the right lower lobe.    7/20/23- Bevacizumab for radiation necrosis    8/16/23- Bevacizumab     9/12/23- Ct CAP-  Stable right lower lobe dominant nodular opacity. No new disease in the chest, abdomen and pelvis.     9/15, 10/6, 10/27, 11/17-  Bevacizumab    10/25/23- MRI Brain- 1. Redemonstrated postoperative changes of right frontal craniotomy. Decreased size of the right frontal resection cavity with significant decrease in the surrounding right frontal lobe vasogenic edema seen on the previous study. Mass effect has essentially resolved in the interim and there is no longer any midline shift. In the interim, slightly increased thickness of a rind of peripheral nodular enhancement along the posterior inferior and medial aspects of the right frontal lobe resection cavity, indeterminate. There is no significant elevated cerebral blood volume in this area. The findings may represent evolving posttreatment changes; however, residual tumor is not excluded.  Stable postoperative changes status post left anterior parietal craniotomy with irregular enhancement in the left frontal lobe parenchyma underlying the resection cavity, likely due to posttreatment change. Other scattered supratentorial and infratentorial metastatic lesions are  overall similar to slightly decreased in size, as described.    12/5/23- PET/CT- with sole site of disease in the RLL measuring 1.6 x 1.4 cm and with SUV max of 4.2. No other sites of disease. His case was reviewed at tumor board and he met with Dr. Fu 12/14/24 with recommendations against surgical resection due to risk of significant pleural scarring. Continued on brigatinib 120 mg daily.     1/29/24 Brain MRI: No new metastatic lesions. No significant change in supratentorial and infratentorial subcentimeter metastatic enhancing lesions. Stable right inferior frontal and left high frontal postsurgical changes.     12/22-current: lost to follow up due to insurance issues    3/15/24- CT chest- Stable nodule medial right lower lobe. No metastatic disease demonstrated.    6/21/24- CT CAP- stable    6/26/24- Brain MRI- Numerous new and increased size of intracranial metastatic lesions. Increased nodular enhancement about the medial aspect of the right frontal lobe resection margin with increased adjacent T2/FLAIR hyperintense signal although without elevated cerebral blood volume could represent posttreatment changes although disease progression could have a similar appearance.  Stable left frontal lobe resection changes with stable underlying curvilinear enhancement.    7/10/24: start lorlatinib 100 mg daily    7/23/24-7/25/24: [facility] with syncopal episodes, suspected to be vasovagal due to low pressures      Interval Hx:  Seeing Connor in clinic. Discharged from hospital late last week. Feeling good since being home. Really has not noticed SE with lorlatinib. He reports since he started around 7/11 he has not missed a dose. He has not had worsening myalgias. He does still go to sleep early but this is feathering off--7:30 pm instead of 5:30.     Had a migraine Sunday--felt anxiety was exacerbating as he was home alone. Now resolved--no vision changes, nausea, imbalance, or syncopal etc.     Does not frequently  check blood sugars at home, working with new PCP for insulin; reports compliance with this and was started on metformin by PCP too. Had fudge cookie prior to coming today so his blood sugar is high    ECOG PS 0-1    REVIEW OF SYSTEMS: 14 point ROS negative other than the symptoms noted above in the HPI.    Wt Readings from Last 4 Encounters:   07/30/24 99.8 kg (220 lb)   07/25/24 96.8 kg (213 lb 8 oz)   07/08/24 98.4 kg (217 lb)   06/26/24 99.2 kg (218 lb 11.2 oz)      Review of Systems:  A comprehensive ROS was performed and found to be negative or non-contributory with the exception of that noted in the HPI above.    Past Medical History:  GERD  Hypertension, not on medication  Type 2 diabetes mellitus, not on medications currently, previously on Metformin    Past Surgical History:  Past Surgical History:   Procedure Laterality Date     BRONCHOSCOPY RIGID OR FLEXIBLE W/TRANSENDOSCOPIC ENDOBRONCHIAL ULTRASOUND GUIDED Bilateral 1/26/2022    Procedure: Right BRONCHOSCOPY, FIBEROPTIC, endobronchial ultrasound, pleural biopsy;  Surgeon: Dallin Agrawal MD;  Location: [facility] OR     INJECT BLOCK MEDIAL BRANCH CERVICAL/THORACIC/LUMBAR       INSERT CHEST TUBE Right 2/16/2022    Procedure: INSERTION, CATHETER, INTERCOSTAL, FOR DRAINAGE;  Surgeon: Dallin Agrawal MD;  Location: [facility] GI     INSERT CHEST TUBE Right 3/9/2022    Procedure: INSERTION, CATHETER, INTERCOSTAL, FOR DRAINAGE;  Surgeon: Sushila Antonio MD;  Location: [facility] GI     IR CHEST TUBE REMOVAL TUNNELED RIGHT  4/2/2022     OPTICAL TRACKING SYSTEM CRANIOTOMY, EXCISE TUMOR, COMBINED Left 6/28/2022    Procedure: Left stealth craniotomy for tumor resection with motor mapping;  Surgeon: Stephen Moran MD;  Location:  OR     OPTICAL TRACKING SYSTEM CRANIOTOMY, EXCISE TUMOR, COMBINED Right 6/27/2023    Procedure: Right stealth craniotomy and resection of tumor;  Surgeon: Stephen Moran MD;  Location:  OR     ORTHOPEDIC SURGERY      Ganesh. Rotator cuff repair.      PLEUROSCOPY N/A 2022    Procedure: Pleuroscopy with Pleural Biopsy;  Surgeon: Dallin Agrawal MD;  Location:  OR       Social History:  Lives with wife and 4 kids in [place]. Works as a  for an apartment complex in [place]. Exposure to household chemicals and . No significant exposure to asbestos. No signal exposure to benzene or similar chemicals. No significant smoking history-states that he smoked 1 to 2 cigarettes occasionally per month for about 2 years in college, non-smoking since then. No significant alcohol use history. No other recreational substances. Good support system. Kids are 23, 19, 17 and 13.    Family History  Significant history for cancers on maternal side. Mother  of uterine cancer. 2 maternal uncles have possible metastatic melanoma.    Outpatient Medications:  Current Outpatient Medications   Medication Sig Dispense Refill     acetaminophen (TYLENOL) 325 MG tablet Take 325-650 mg by mouth every 6 hours as needed for mild pain       albuterol (PROAIR HFA/PROVENTIL HFA/VENTOLIN HFA) 108 (90 Base) MCG/ACT inhaler Inhale 2 puffs into the lungs every 6 hours as needed for shortness of breath, wheezing or cough 18 g 0     Alcohol Swabs PADS Use to swab the area of the injection or jabier as directed. Per insurance coverage 100 each 0     baclofen (LIORESAL) 10 MG tablet Take 0.5-1 tablets (5-10 mg) by mouth 3 times daily as needed for other (hiccups) 60 tablet 4     blood glucose (NO BRAND SPECIFIED) lancets standard To use to test glucose level in the blood Use to test blood sugar  4  times daily as directed. To accompany glucose monitor brands per insurance coverage. 100 each 3     blood glucose (NO BRAND SPECIFIED) test strip To use to test glucose level in the blood Use to test blood sugar  4 times daily as directed. To accompany glucose monitor brands per insurance coverage. 100 strip 3     Blood Glucose Monitoring Suppl (ACCU-CHEK GUIDE)  w/Device KIT        DULoxetine (CYMBALTA) 30 MG capsule Take 1 capsule (30 mg) by mouth 2 times daily 60 capsule 2     empagliflozin (JARDIANCE) 10 MG TABS tablet Take 1 tablet (10 mg) by mouth daily 30 tablet 0     [START ON 8/30/2024] empagliflozin (JARDIANCE) 25 MG TABS tablet Take 1 tablet (25 mg) by mouth daily 90 tablet 1     FLUoxetine (PROZAC) 20 MG capsule Take 1 capsule (20 mg) by mouth daily 30 capsule 1     insulin aspart (NOVOLOG PEN) 100 UNIT/ML pen Inject 0-40 Units Subcutaneous 3 times daily (before meals) Per discharge instructions sliding scale 45 mL 2     insulin glargine (LANTUS PEN) 100 UNIT/ML pen Inject 70 Units Subcutaneous every morning 15 mL 4     insulin pen needle (32G X 4 MM) 32G X 4 MM miscellaneous Use as directed by provider. Per insurance coverage 100 each 0     levETIRAcetam (KEPPRA) 1000 MG tablet Take 1 tablet (1,000 mg) by mouth 2 times daily 60 tablet 11     lisinopril (ZESTRIL) 40 MG tablet Take 1 tablet (40 mg) by mouth daily 30 tablet 1     lorlatinib (LORBRENA) 100 MG Take 1 tablet (100 mg) by mouth daily 30 tablet 0     metFORMIN (GLUCOPHAGE) 500 MG tablet Take 1 tablet (500 mg) by mouth 2 times daily (with meals) 60 tablet 3     methadone (DOLOPHINE) 5 MG tablet Take 1.5 tablets (7.5 mg) by mouth 2 times daily 90 tablet 0     methylphenidate (RITALIN) 5 MG tablet Take 1-2 tablets (5-10 mg) by mouth 2 times daily as needed (fatigue) Take second dose by 12 noon, if it is needed 90 tablet 0     naloxone (NARCAN) 4 MG/0.1ML nasal spray Spray 1 spray (4 mg) into one nostril alternating nostrils as needed for opioid reversal every 2-3 minutes until assistance arrives 0.2 mL 3     oxyCODONE IR (ROXICODONE) 10 MG tablet Take 1 tablet (10 mg) by mouth every 3 hours as needed for moderate pain 120 tablet 0     prochlorperazine (COMPAZINE) 10 MG tablet Take 1 tablet (10 mg) by mouth every 6 hours as needed for nausea or vomiting 30 tablet 2     propranolol (INDERAL) 20 MG tablet Take  1 tablet (20 mg) by mouth 2 times daily 60 tablet 1     rivaroxaban ANTICOAGULANT (XARELTO) 20 MG TABS tablet Take 1 tablet (20 mg) by mouth daily (with dinner) 90 tablet 3     rosuvastatin (CRESTOR) 5 MG tablet Take 1 tablet (5 mg) by mouth daily 90 tablet 0     No current facility-administered medications for this visit.     There were no vitals taken for this visit.  General: No acute distress, no rashes on skin.   HEENT: Sclera anicteric. Oral mucosa pink and moist.  No mucositis or thrush  Heart: Regular, rate, and rhythm  Lungs: Clear to ascultation bilaterally. Breathing comfortably  Abdomen: Positive bowel sounds. Soft, non-distended, non-tender.   Extremities: no lower extremity edema  Neuro: Cranial nerves grossly intact      Labs & Studies: I personally reviewed the following studies:  Most Recent 3 CBC's:  Recent Labs   Lab Test 07/24/24  0658 07/23/24  1334 07/10/24  1702   WBC 11.2* 17.5* 14.5*   HGB 14.1 15.1 15.0   MCV 93 92 90    288 260     Most Recent 3 BMP's:  Recent Labs   Lab Test 07/25/24  1235 07/25/24  0830 07/24/24  2301 07/24/24  0748 07/24/24  0658 07/23/24  2140 07/23/24  1334 07/10/24  1702   NA  --   --   --   --  138  --  137 140   POTASSIUM  --   --   --   --  4.4  --  4.1 3.9   CHLORIDE  --   --   --   --  102  --  100 100   CO2  --   --   --   --  28  --  24 29   BUN  --   --   --   --  20.5*  --  21.1* 30.8*   CR  --   --   --   --  0.76  --  0.66* 0.97   ANIONGAP  --   --   --   --  8  --  13 11   TORY  --   --   --   --  9.0  --  9.8 9.4   * 276* 265*   < > 315*   < > 462* 330*    < > = values in this interval not displayed.    Most Recent 2 LFT's:  Recent Labs   Lab Test 07/23/24  1334 07/10/24  1702   AST 16 20   ALT 18 18   ALKPHOS 135 96   BILITOTAL 0.3 0.4    Most Recent TSH and T4:  Recent Labs   Lab Test 09/12/22  1642   TSH 2.56        ASSESSMENT AND PLAN:  Stage IV NSCLC, Rt lung adenocarcinoma with metastasis to pleura, mediastinum , rt pleural effusion  and brain diagnosed 1/2022 (AJCC 8th edition)  PD-L1 TPS 2-3% by [place]   NGS [facility] panel-EML4:ALK rearragement; chr2:[id number], chr2:[id number]  NGS [facility]- GNAS R201H, KRAS K5E- No ALK    He began Alectinib 600 mg BID 3/2/22 and unfortunately developed grade 3 myalgias  requiring dose reduction to 300 mg BID.   In Dec 2022,we stopped drug 12/20/22 due to unremitting arthalgias, eventully had to starte PO steroids which led to improvement and resolved. Radiographicaly, he has had a near CR to Rx in the lung, has a residual rt LL lesion.     Began brigatinib 2/22/23 (delayed due to pt hesitancy). Developed severe cough on day 2 so brigatinib was held and cough resolved with in 24-48 hours. He restarted 60 mg daily and upped it to 90 mg.Restging CT showing stable disease in the lung, however, MRI with several contrast enhancement and increase in size of previously treated lesions. His dose was increased to 180 mg daily to address possible CNS disease progression and started on dexamethasone. 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 Restaging CT in 9/2023 showing overall disease stablity with no evidence of active cancer. 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  PET/CT after these three months showed oligoperisstent disease with a single focus of active malignancy in the RLL. Met with Thoracic surgery 12/2023 to discuss resection but they recommended against it due to risk for scarring after. 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 MOst recent CT showing stable disease but brain MRI showing several possible new lesions and enlargement of exissting lesion (see below).     Switched Rx to Lorlatinib due to good CNS penetration. Holding off on GK until we assess  response.     Plan  Continue lorlatinib   Video with me 8/16  Labs and brain MRI in ~5 weeks  CT CAP q3 mo from last    #syncopal episodes: reviewed hospital notes with extensive work up. suspected to be vasovagal due to low pressures. No further episodes.     # Brain mets:   # Radiation necrosis,   -Baseline Brain MRI with several brain mets, s/p GK to 11-12 brain mets. F/u Brain MRI in June 2022 was showing enlargement of the one of the lesions along with edema, therefore had to undergo craniotomy followed by resection, the final biopsy consistent with radiation necrosis.   -received two doses bevacizumab for radiation necrosis in Fall 2022, tolerated poorly with HTN urgency and PE.)   -MRI in 4/2023 now showing contrast enhancement of lesions and a dominate lesion in the R frontal region with edema, several other smaller lesion. Short term MRI showing increase in size of the rt frontal lesion, underwent resection, patho again showing radiation necrosis. Restarted bevacizumab, which resulted in improved radiation necrosis / vasogenic edema on imaging in 10/2023 but ultimately was stopped due to uncontrolled HTN, last dose being 11/2023  -MRI imaging 1/2024 with stable disease and no edema.  -Brain MRI 3/2024 cancelled due to lapse in medical insurance.  -Most recent MRI 6/2024 showing showing several possible new lesions and enlargement of exissting lesion, however I am unsure if this is due to better imaging (perfusion MRI this time). Reviewed with rad onc and elected for short term MRI scan following initiation of lorlatinib, hold off on GK.    #HTN: anti-HTN agents held due to recent hospitalization as over-correction was felt to be contributing to syncopal episodes. Now with rebound HTN--was started on two BP agents yesterday but his wife is picking up today so he has not started. He is borderline where we would hold lorlatinib, but given he will have BP meds at home to start this evening, we will continue  lorlatinib. We again reviewed the risks of ongoing uncontrolled blood pressure.     #hypercholesteremia   Continue rosuvastatin 5 mg; confirmed he is taking    #Right pleuritic/chest pain  Felt to be 2/2 prior pleurex drain. No acute resp changes today. Pain now improving on increased dose of methadone and prn oxy  -On methadone and oxycodone; followed by Palliative Care a     #Elevated Lipase  -mild elevation. No concern on exam    #Diabetes, Type 2  Hyperglycemia on labs. Compliant with insulin but not very adherent to diet. Started on metformin recently.     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 Stable now for some time      Transition Care Management Services  Interactive contact date: 7/27/2024  Face-to-face visit date: 7/31/2024        Medical complexity decision making: High complexity ([id number])      50 minutes spent on the date of the encounter doing chart review, review of test results, interpretation of tests, patient visit, and documentation     Chelsea Villegas CNP on 7/31/2024 at 3:19 PM        Again, thank you for allowing me to participate in the care of your patient.        Sincerely,        Chelsea Villegas CNP

## 2024-07-31 NOTE — NURSING NOTE
Chief Complaint   Patient presents with    Blood Draw     Labs drawn with  by RN. Vitals taken. Pt checked into next appointment.       Purnima Peng RN

## 2024-08-01 ENCOUNTER — VIRTUAL VISIT (OUTPATIENT)
Dept: PALLIATIVE CARE | Facility: CLINIC | Age: 44
End: 2024-08-01
Attending: FAMILY MEDICINE
Payer: COMMERCIAL

## 2024-08-01 VITALS
HEIGHT: 69 IN | SYSTOLIC BLOOD PRESSURE: 159 MMHG | WEIGHT: 220 LBS | DIASTOLIC BLOOD PRESSURE: 103 MMHG | BODY MASS INDEX: 32.58 KG/M2

## 2024-08-01 DIAGNOSIS — C79.31 MALIGNANT NEOPLASM METASTATIC TO BRAIN (H): ICD-10-CM

## 2024-08-01 DIAGNOSIS — Z51.5 PALLIATIVE CARE PATIENT: Primary | ICD-10-CM

## 2024-08-01 DIAGNOSIS — C34.90 NSCLC METASTATIC TO BRAIN (H): ICD-10-CM

## 2024-08-01 DIAGNOSIS — C79.31 NSCLC METASTATIC TO BRAIN (H): ICD-10-CM

## 2024-08-01 DIAGNOSIS — F41.9 ANXIETY: ICD-10-CM

## 2024-08-01 DIAGNOSIS — G89.3 CANCER ASSOCIATED PAIN: ICD-10-CM

## 2024-08-01 PROCEDURE — 99214 OFFICE O/P EST MOD 30 MIN: CPT | Mod: 95 | Performed by: FAMILY MEDICINE

## 2024-08-01 NOTE — PROGRESS NOTES
Virtual Visit Details    Type of service:  Video Visit   Video Start Time: 3:40 PM  Video End Time: 1605    Originating Location (pt. Location): Home    Distant Location (provider location):  On-site  Platform used for Video Visit: [facility]    Palliative Care Outpatient Clinic Progress Note    Patient Name: Connor Emerson  Primary Provider: Bassam Persaud    IImpression & Recommendations & Counseling:  Connor Emerson is a 44 year old male with history of NSCLC with ALK rearrangement and mets to the brain s/p gamma knife treatments and craniotomy open excision and currently on a second line TKI. He is experiencing 'stiffness' from the TKI and this is better overall and worse when he works. He has a lot of cancer related fatigue.  ECO  Decisional Capacity: very present     PDMP review:  Yes, no concerns     Metastatic NSCLC with ALK rearrangement  S/p GK to brain mets and craniotomy for excision  Cancer associated pain on Methadone 2.5 mg po in AM and 5 mg at HS and using prn oxycodone 10 mg about TID  TKI associated muscle stiffness with good response to HS flexeril  HTN  Headaches--overall better and Connor prefers to minimize use of steroids, if at all possible.  Cancer associated fatigue     Goals of Care:  2024  Connor feels like he is living on borrowed time--he was once told he had to live until about 2023 and he worries this may be his last summer and he doesn't want to spend it recovering from surgery or feeling poorly.  So he is going to do a PET scan and if it is negative he for sure would not want surgery.  He is swayed by surgeons feeling it is scar tissue from his previous chest tube.  If there is recurrent tumor Connor would consider radiation therapy and he is also thinking it might be time to put his focus on a comfortable death.  He seems to have a very sound decision making framework and he's just missing some data at this time.  2023  no change in GOC  3/1/2023 Connor wants to  continue cancer-directed therapies as long as the side effects are tolerable.  He is a FULL code should he have a cardiac arrest. He is OK being cared for in the acute care hospital if needed.     Recommendations & Counseling:  Continue cancer care per Dr. Persaud and his team  CONTINUE Methadone 7.5 MG PO bid   Continue oxycodone 5-10 mg po q 4 hours prn;  Continue to hold Ambien   Continue Duloxetine.  Narcan nasal spray rx also sent to pharmacy last visit   List of community counseling resources provided in the AVS.  List of sleep hygiene ideas provided in AVS    CONTINUE  Ritalin 5-10 mg po when first rising and at noon, if needed.      CONTINUE flexeril 5 mg at HS to see if it helps morning stiffness;   [facility] and [facility] pharmacy resources did not indicate a drug-drug interaction with methadone.     F/up in 8 weeks and sooner prn.          Counseling: All of the above was explained to the patient in lay language. The patient has verbalized a clear understanding of the discussion, asked appropriate questions, which have been answered to patient's apparent satisfaction. The patient is in agreement with the above plan.        Chief Complaint/Patient ID: Connor Emerson 44 year old male with PMHx of  NSCLC with ALK rearrangement and mets to the brain s/p gamma knife treatments and craniotomy open excision and currently on a second line TKI. He is experiencing 'stiffness' from the TKI that has improved with scheduled flexeril at HS and he switched to lorlatinib in early July 2024       Last Palliative care appointment: 06/06/2024 with me     Reviewed:  Yes:   reviewed - controlled substances reflected in medication list.    Interim History:  Connor Emerson is a 44 year old male who is seen today for follow up with Palliative Care via billable video visit. Connor was admitted to [facility] last week for presyncope and that is better now. He is hydrating well (he says he may have drunk 4 gallons of water today).  He is  drinking a lot to stay hydrated and sometimes it has a little electrolyte replacement with it.     6/21/24- CT CAP- stable     6/26/24- Brain MRI- Numerous new and increased size of intracranial metastatic lesions. Increased nodular enhancement about the medial aspect of the right frontal lobe resection margin with increased adjacent T2/FLAIR hyperintense signal although without elevated cerebral blood volume could represent posttreatment changes although disease progression could have a similar appearance.  Stable left frontal lobe resection changes with stable underlying curvilinear enhancement.     7/10/24: start lorlatinib 100 mg daily     7/23/24-7/25/24: [facility] with syncopal episodes, suspected to be vasovagal due to low pressures  Pain:  pain has been mild (possibly due to working a lot and being distracted). He doesn't need refills at this time.    Appetite/Nausea: OK     Bowels: no constipation     Sleep: sleeping poorly     Mood: a little depression and anxiety; not seeing a counselor     Coping:  Connor says he feels a lot is going on with his health and recent admission and his daughter's spine problems and likely need for spine surgery for her later this summer at the [facility].    Family History- Reviewed in Epic.    Allergies   Allergen Reactions    Vicodin [Hydrocodone-Acetaminophen] Nausea and Vomiting and GI Disturbance       Social History:  Pertinent changes to social history/social situation since last visit: daughter will be having major spine surgery at [facility] later this summer.  Key support resources: family  Advance Directive Status:  no ACP documents in Epic    Social History     Tobacco Use    Smoking status: Never     Passive exposure: Never    Smokeless tobacco: Never   Vaping Use    Vaping status: Never Used   Substance Use Topics    Alcohol use: Yes     Alcohol/week: 0.0 standard drinks of alcohol     Comment: social    Drug use: No         Allergies   Allergen Reactions    Vicodin  [Hydrocodone-Acetaminophen] Nausea and Vomiting and GI Disturbance     Current Outpatient Medications   Medication Sig Dispense Refill    acetaminophen (TYLENOL) 325 MG tablet Take 325-650 mg by mouth every 6 hours as needed for mild pain      albuterol (PROAIR HFA/PROVENTIL HFA/VENTOLIN HFA) 108 (90 Base) MCG/ACT inhaler Inhale 2 puffs into the lungs every 6 hours as needed for shortness of breath, wheezing or cough 18 g 0    Alcohol Swabs PADS Use to swab the area of the injection or jabier as directed. Per insurance coverage 100 each 0    baclofen (LIORESAL) 10 MG tablet Take 0.5-1 tablets (5-10 mg) by mouth 3 times daily as needed for other (hiccups) 60 tablet 4    blood glucose (NO BRAND SPECIFIED) lancets standard To use to test glucose level in the blood Use to test blood sugar  4  times daily as directed. To accompany glucose monitor brands per insurance coverage. 100 each 3    blood glucose (NO BRAND SPECIFIED) test strip To use to test glucose level in the blood Use to test blood sugar  4 times daily as directed. To accompany glucose monitor brands per insurance coverage. 100 strip 3    Blood Glucose Monitoring Suppl (ACCU-CHEK GUIDE) w/Device KIT       DULoxetine (CYMBALTA) 30 MG capsule Take 1 capsule (30 mg) by mouth 2 times daily 60 capsule 2    empagliflozin (JARDIANCE) 10 MG TABS tablet Take 1 tablet (10 mg) by mouth daily 30 tablet 0    [START ON 8/30/2024] empagliflozin (JARDIANCE) 25 MG TABS tablet Take 1 tablet (25 mg) by mouth daily 90 tablet 1    FLUoxetine (PROZAC) 20 MG capsule Take 1 capsule (20 mg) by mouth daily 30 capsule 1    insulin aspart (NOVOLOG PEN) 100 UNIT/ML pen Inject 0-40 Units Subcutaneous 3 times daily (before meals) Per discharge instructions sliding scale 45 mL 2    insulin glargine (LANTUS PEN) 100 UNIT/ML pen Inject 70 Units Subcutaneous every morning 15 mL 4    insulin pen needle (32G X 4 MM) 32G X 4 MM miscellaneous Use as directed by provider. Per insurance coverage 100  each 0    levETIRAcetam (KEPPRA) 1000 MG tablet Take 1 tablet (1,000 mg) by mouth 2 times daily 60 tablet 11    lisinopril (ZESTRIL) 40 MG tablet Take 1 tablet (40 mg) by mouth daily 30 tablet 1    lorlatinib (LORBRENA) 100 MG Take 1 tablet (100 mg) by mouth daily 30 tablet 0    metFORMIN (GLUCOPHAGE) 500 MG tablet Take 1 tablet (500 mg) by mouth 2 times daily (with meals) 60 tablet 3    methadone (DOLOPHINE) 5 MG tablet Take 1.5 tablets (7.5 mg) by mouth 2 times daily 90 tablet 0    methylphenidate (RITALIN) 5 MG tablet Take 1-2 tablets (5-10 mg) by mouth 2 times daily as needed (fatigue) Take second dose by 12 noon, if it is needed 90 tablet 0    naloxone (NARCAN) 4 MG/0.1ML nasal spray Spray 1 spray (4 mg) into one nostril alternating nostrils as needed for opioid reversal every 2-3 minutes until assistance arrives 0.2 mL 3    oxyCODONE IR (ROXICODONE) 10 MG tablet Take 1 tablet (10 mg) by mouth every 3 hours as needed for moderate pain 120 tablet 0    prochlorperazine (COMPAZINE) 10 MG tablet Take 1 tablet (10 mg) by mouth every 6 hours as needed for nausea or vomiting 30 tablet 2    propranolol (INDERAL) 20 MG tablet Take 1 tablet (20 mg) by mouth 2 times daily 60 tablet 1    rivaroxaban ANTICOAGULANT (XARELTO) 20 MG TABS tablet Take 1 tablet (20 mg) by mouth daily (with dinner) 90 tablet 3    rosuvastatin (CRESTOR) 5 MG tablet Take 1 tablet (5 mg) by mouth daily 90 tablet 0     Past Medical History:   Diagnosis Date    Atypical chest pain 12/02/2013    Cancer (H)     Complication of anesthesia     Diabetes (H)     GERD (gastroesophageal reflux disease) 12/02/2013    History of pulmonary embolism     HTN (hypertension) 05/14/2012    HTN, goal below 140/90 07/02/2013    Insomnia 02/21/2012    Mediastinal lymphadenopathy     Metastasis to brain (H) 2022    Migraine headache 07/02/2013    Migraine with aura, without mention of intractable migraine without mention of status migrainosus     Pneumonia      Past  Surgical History:   Procedure Laterality Date    BRONCHOSCOPY RIGID OR FLEXIBLE W/TRANSENDOSCOPIC ENDOBRONCHIAL ULTRASOUND GUIDED Bilateral 1/26/2022    Procedure: Right BRONCHOSCOPY, FIBEROPTIC, endobronchial ultrasound, pleural biopsy;  Surgeon: Dallin Agrawal MD;  Location: [facility] OR    INJECT BLOCK MEDIAL BRANCH CERVICAL/THORACIC/LUMBAR      INSERT CHEST TUBE Right 2/16/2022    Procedure: INSERTION, CATHETER, INTERCOSTAL, FOR DRAINAGE;  Surgeon: Dallin Agrawal MD;  Location: [facility] GI    INSERT CHEST TUBE Right 3/9/2022    Procedure: INSERTION, CATHETER, INTERCOSTAL, FOR DRAINAGE;  Surgeon: Sushila Antonio MD;  Location: [facility] GI    IR CHEST TUBE REMOVAL TUNNELED RIGHT  4/2/2022    OPTICAL TRACKING SYSTEM CRANIOTOMY, EXCISE TUMOR, COMBINED Left 6/28/2022    Procedure: Left stealth craniotomy for tumor resection with motor mapping;  Surgeon: Stephen Moran MD;  Location:  OR    OPTICAL TRACKING SYSTEM CRANIOTOMY, EXCISE TUMOR, COMBINED Right 6/27/2023    Procedure: Right stealth craniotomy and resection of tumor;  Surgeon: Stephen Moran MD;  Location:  OR    ORTHOPEDIC SURGERY      Ganesh. Rotator cuff repair.    PLEUROSCOPY N/A 1/26/2022    Procedure: Pleuroscopy with Pleural Biopsy;  Surgeon: Dallin Agrawal MD;  Location: [facility] OR       Physical Exam:   GENERAL APPEARANCE: robust young man, alert and no distress; neatly groomed  EYES: Eyes grossly normal to inspection, PERRLA, conjunctivae and sclerae without injection or discharge, EOM intact   RESP:  no increased work of breathing; speaks in complete sentences;   MS: No musculoskeletal defects are noted  SKIN: No suspicious lesions or rashes, hydration status appears adequate with normal skin turgor   PSYCH: Alert and oriented x3; speech- coherent , normal rate and volume; able to articulate logical thoughts, able to abstract reason, no tangential thoughts, no hallucinations or delusions, mentation appears normal, Mood is euthymic. Affect is appropriate  for this mood state and bright. Thought content is free of suicidal ideation, hallucinations, and delusions.  Eye contact is good during conversation.       Key Data Reviewed:  LABS: 2024- Cr 0.63, Albumin 4.1,  Hgb 14.3,      IMAGIN24- Brain MRI- Numerous new and increased size of intracranial metastatic lesions. Increased nodular enhancement about the medial aspect of the right frontal lobe resection margin with increased adjacent T2/FLAIR hyperintense signal although without elevated cerebral blood volume could represent posttreatment changes although disease progression could have a similar appearance. Stable left frontal lobe resection changes with stable underlying curvilinear enhancement.     2024 CT CAP W/CONTRAST  IMPRESSION:  1.  Stable exam without evidence for new disease.  2.  Stable irregular pulmonary nodule at the medial right lower lobe.       35 minutes spent on the date of the encounter doing chart review, history and exam, patient education & counseling, documentation and other activities as noted above.    Ajith Brewer MD MS FAAFP CAQHPM  [facility]  Office [phone]  Fax [phone]

## 2024-08-01 NOTE — PATIENT INSTRUCTIONS
.It was good to see you today, Connor.    Here are the things we talked about:  Let us know when you next need prescriptions for pain meds  Try to get electrolytes with all of the water you are drinking to avoid water intoxication.    I think it would be a good idea to visit with a therapist for all of the many things going on in your life.  Here is a listed of trusted people and organizations.  [facility] Resource List   Name  Contact Info Areas of focus  Notes    [facility]  [place]  [phone]  [email or web address] Treatment of children, adolescents and young adults     [facility]  [address], [place]  [phone]  Serious illness, grief and loss, ambiguous grief, trauma     Abigail Bass [facility] [place]   [phone]  [email or web address] illness and related grief     LISETH Lira, [facility]  [phone]  Telehealth only    LISETH Jarquin [facility] Aguila Oliveira, [facility]  [facility]  Serious illness, trauma, has palliative care experience  In person and telehealth    [facility]  [phone]  [email or web address]     Dr Dulce Acevedo [address]  [place], [place]  [email or web address]  [phone] Anxiety / depression, trauma for pt's with chronic / terminal illness; grief/loss; relationship repair, mindfulness/mind body work    Erma Beckwith [phone] Couples, eol/serious illness; Quaker focus     Giselle Mac  [phone] Couples / eol/serious illness; spiritual direction     [email or web address] [email or web address]  anxiety, depression, serious illness, Quaker focus   experience with palliative care and children Telehealth only   [facility] [email or web address] Grief and loss, caregiving, dementia coaching, support and education     [facility]  [address], [place]  Therapist Tess  Melissa is highly recommended    Giselle Frank  [phone]     Angelica Cardozo [place]  [phone]        Someone from the team will reach out to schedule a follow up appointment in 2-3 months.    Sleep Hygine plan:   1. Regular bedtime and rise time  2. Avoid napping --especially naps lasting longer than 1 hour and naps late in the day.   3. Limit caffeine --avoid caffeine after lunch.   4. Exercise   5. Keep the sleep environment quiet and dark.  Noiseand light exposure during the night can disrupt sleep. White noise or ear plugs are often recommended to reduce noise. Using blackout shades or an eye mask is commonly recommended to reduce light.   6. NO television ortechnology near bedtime       How to get a hold of us:  For non-urgent matters, MyChart works best.    For more urgent matters, or if you prefer not to use MyChart, call our clinic nurse coordinator Reyna Ma RN at [phone]    We have an on-call number for evenings and weekends. Please call this only if you are having uncontrolled symptoms or serious side effects from your medicines: [phone].     For refills, please give us a week (5 working days) notice. We don't always have providers available everyday to do refills. If you call the day you run out of your medicine, we may not be able to refill it in time, so call 5 days in advance!    Ajith Brewer MD MS FAAFP CAQHPM  [facility]  Office [phone]  Fax [phone]

## 2024-08-01 NOTE — NURSING NOTE
Is the patient currently in the state of [place]? YES    Visit mode:VIDEO    If the visit is dropped, the patient can be reconnected by: VIDEO VISIT: Send to e-mail at: [email or web address]    Will anyone else be joining the visit? NO  (If patient encounters technical issues they should call [phone]    How would you like to obtain your AVS? MyChart    Are changes needed to the allergy or medication list? No      Rooming Documentation:  Questionnaire(s) not pre-assigned      Reason for visit: Video Visit (Follow Up )    Celi OCONNOR

## 2024-08-01 NOTE — LETTER
2024       RE: Connor Emerson  [address]  [facility] [address]     Dear Colleague,    Thank you for referring your patient, Connor Emerson[facility]. Please see a copy of my visit note below.    Palliative Care Outpatient Clinic Progress Note    Patient Name: Connor Emerson  Primary Provider: Bassam Persaud    IImpression & Recommendations & Counseling:  Connor Emerson is a 44 year old male with history of NSCLC with ALK rearrangement and mets to the brain s/p gamma knife treatments and craniotomy open excision and currently on a second line TKI. He is experiencing 'stiffness' from the TKI and this is better overall and worse when he works. He has a lot of cancer related fatigue.  ECO  Decisional Capacity: very present     PDMP review:  Yes, no concerns     Metastatic NSCLC with ALK rearrangement  S/p GK to brain mets and craniotomy for excision  Cancer associated pain on Methadone 2.5 mg po in AM and 5 mg at HS and using prn oxycodone 10 mg about TID  TKI associated muscle stiffness with good response to HS flexeril  HTN  Headaches--overall better and Connor prefers to minimize use of steroids, if at all possible.  Cancer associated fatigue     Goals of Care:  2024  Connor feels like he is living on borrowed time--he was once told he had to live until about 2023 and he worries this may be his last summer and he doesn't want to spend it recovering from surgery or feeling poorly.  So he is going to do a PET scan and if it is negative he for sure would not want surgery.  He is swayed by surgeons feeling it is scar tissue from his previous chest tube.  If there is recurrent tumor Connor would consider radiation therapy and he is also thinking it might be time to put his focus on a comfortable death.  He seems to have a very sound decision making framework and he's just missing some data  at this time.  12/19/2023  no change in GOC  3/1/2023 Connor wants to continue cancer-directed therapies as long as the side effects are tolerable.  He is a FULL code should he have a cardiac arrest. He is OK being cared for in the acute care hospital if needed.     Recommendations & Counseling:  Continue cancer care per Dr. Persaud and his team  CONTINUE Methadone 7.5 MG PO bid   Continue oxycodone 5-10 mg po q 4 hours prn;  Continue to hold Ambien   Continue Duloxetine.  Narcan nasal spray rx also sent to pharmacy last visit   List of community counseling resources provided in the AVS.  List of sleep hygiene ideas provided in AVS    CONTINUE  Ritalin 5-10 mg po when first rising and at noon, if needed.      CONTINUE flexeril 5 mg at HS to see if it helps morning stiffness;   UTD and [facility] pharmacy resources did not indicate a drug-drug interaction with methadone.     F/up in 8 weeks and sooner prn.          Counseling: All of the above was explained to the patient in lay language. The patient has verbalized a clear understanding of the discussion, asked appropriate questions, which have been answered to patient's apparent satisfaction. The patient is in agreement with the above plan.        Chief Complaint/Patient ID: Connor Emerson 44 year old male with PMHx of  NSCLC with ALK rearrangement and mets to the brain s/p gamma knife treatments and craniotomy open excision and currently on a second line TKI. He is experiencing 'stiffness' from the TKI that has improved with scheduled flexeril at HS and he switched to lorlatinib in early July 2024       Last Palliative care appointment: 06/06/2024 with me     Reviewed:  Yes:   reviewed - controlled substances reflected in medication list.    Interim History:  Connor Emerson is a 44 year old male who is seen today for follow up with Palliative Care via billable video visit. Connor was admitted to [facility] last week for presyncope and that is better now. He is hydrating  well (he says he may have drunk 4 gallons of water today).  He is drinking a lot to stay hydrated and sometimes it has a little electrolyte replacement with it.     6/21/24- CT CAP- stable     6/26/24- Brain MRI- Numerous new and increased size of intracranial metastatic lesions. Increased nodular enhancement about the medial aspect of the right frontal lobe resection margin with increased adjacent T2/FLAIR hyperintense signal although without elevated cerebral blood volume could represent posttreatment changes although disease progression could have a similar appearance.  Stable left frontal lobe resection changes with stable underlying curvilinear enhancement.     7/10/24: start lorlatinib 100 mg daily     7/23/24-7/25/24: [facility] with syncopal episodes, suspected to be vasovagal due to low pressures  Pain:  pain has been mild (possibly due to working a lot and being distracted). He doesn't need refills at this time.    Appetite/Nausea: OK     Bowels: no constipation     Sleep: sleeping poorly     Mood: a little depression and anxiety; not seeing a counselor     Coping:  Connor says he feels a lot is going on with his health and recent admission and his daughter's spine problems and likely need for spine surgery for her later this summer at the [facility].    Family History- Reviewed in Epic.    Allergies   Allergen Reactions    Vicodin [Hydrocodone-Acetaminophen] Nausea and Vomiting and GI Disturbance       Social History:  Pertinent changes to social history/social situation since last visit: daughter will be having major spine surgery at [place] later this summer.  Key support resources: family  Advance Directive Status:  no ACP documents in Epic    Social History     Tobacco Use    Smoking status: Never     Passive exposure: Never    Smokeless tobacco: Never   Vaping Use    Vaping status: Never Used   Substance Use Topics    Alcohol use: Yes     Alcohol/week: 0.0 standard drinks of alcohol     Comment: social    Drug use:  No         Allergies   Allergen Reactions    Vicodin [Hydrocodone-Acetaminophen] Nausea and Vomiting and GI Disturbance     Current Outpatient Medications   Medication Sig Dispense Refill    acetaminophen (TYLENOL) 325 MG tablet Take 325-650 mg by mouth every 6 hours as needed for mild pain      albuterol (PROAIR HFA/PROVENTIL HFA/VENTOLIN HFA) 108 (90 Base) MCG/ACT inhaler Inhale 2 puffs into the lungs every 6 hours as needed for shortness of breath, wheezing or cough 18 g 0    Alcohol Swabs PADS Use to swab the area of the injection or jabier as directed. Per insurance coverage 100 each 0    baclofen (LIORESAL) 10 MG tablet Take 0.5-1 tablets (5-10 mg) by mouth 3 times daily as needed for other (hiccups) 60 tablet 4    blood glucose (NO BRAND SPECIFIED) lancets standard To use to test glucose level in the blood Use to test blood sugar  4  times daily as directed. To accompany glucose monitor brands per insurance coverage. 100 each 3    blood glucose (NO BRAND SPECIFIED) test strip To use to test glucose level in the blood Use to test blood sugar  4 times daily as directed. To accompany glucose monitor brands per insurance coverage. 100 strip 3    Blood Glucose Monitoring Suppl (ACCU-CHEK GUIDE) w/Device KIT       DULoxetine (CYMBALTA) 30 MG capsule Take 1 capsule (30 mg) by mouth 2 times daily 60 capsule 2    empagliflozin (JARDIANCE) 10 MG TABS tablet Take 1 tablet (10 mg) by mouth daily 30 tablet 0    [START ON 8/30/2024] empagliflozin (JARDIANCE) 25 MG TABS tablet Take 1 tablet (25 mg) by mouth daily 90 tablet 1    FLUoxetine (PROZAC) 20 MG capsule Take 1 capsule (20 mg) by mouth daily 30 capsule 1    insulin aspart (NOVOLOG PEN) 100 UNIT/ML pen Inject 0-40 Units Subcutaneous 3 times daily (before meals) Per discharge instructions sliding scale 45 mL 2    insulin glargine (LANTUS PEN) 100 UNIT/ML pen Inject 70 Units Subcutaneous every morning 15 mL 4    insulin pen needle (32G X 4 MM) 32G X 4 MM miscellaneous  Use as directed by provider. Per insurance coverage 100 each 0    levETIRAcetam (KEPPRA) 1000 MG tablet Take 1 tablet (1,000 mg) by mouth 2 times daily 60 tablet 11    lisinopril (ZESTRIL) 40 MG tablet Take 1 tablet (40 mg) by mouth daily 30 tablet 1    lorlatinib (LORBRENA) 100 MG Take 1 tablet (100 mg) by mouth daily 30 tablet 0    metFORMIN (GLUCOPHAGE) 500 MG tablet Take 1 tablet (500 mg) by mouth 2 times daily (with meals) 60 tablet 3    methadone (DOLOPHINE) 5 MG tablet Take 1.5 tablets (7.5 mg) by mouth 2 times daily 90 tablet 0    methylphenidate (RITALIN) 5 MG tablet Take 1-2 tablets (5-10 mg) by mouth 2 times daily as needed (fatigue) Take second dose by 12 noon, if it is needed 90 tablet 0    naloxone (NARCAN) 4 MG/0.1ML nasal spray Spray 1 spray (4 mg) into one nostril alternating nostrils as needed for opioid reversal every 2-3 minutes until assistance arrives 0.2 mL 3    oxyCODONE IR (ROXICODONE) 10 MG tablet Take 1 tablet (10 mg) by mouth every 3 hours as needed for moderate pain 120 tablet 0    prochlorperazine (COMPAZINE) 10 MG tablet Take 1 tablet (10 mg) by mouth every 6 hours as needed for nausea or vomiting 30 tablet 2    propranolol (INDERAL) 20 MG tablet Take 1 tablet (20 mg) by mouth 2 times daily 60 tablet 1    rivaroxaban ANTICOAGULANT (XARELTO) 20 MG TABS tablet Take 1 tablet (20 mg) by mouth daily (with dinner) 90 tablet 3    rosuvastatin (CRESTOR) 5 MG tablet Take 1 tablet (5 mg) by mouth daily 90 tablet 0     Past Medical History:   Diagnosis Date    Atypical chest pain 12/02/2013    Cancer (H)     Complication of anesthesia     Diabetes (H)     GERD (gastroesophageal reflux disease) 12/02/2013    History of pulmonary embolism     HTN (hypertension) 05/14/2012    HTN, goal below 140/90 07/02/2013    Insomnia 02/21/2012    Mediastinal lymphadenopathy     Metastasis to brain (H) 2022    Migraine headache 07/02/2013    Migraine with aura, without mention of intractable migraine without  mention of status migrainosus     Pneumonia      Past Surgical History:   Procedure Laterality Date    BRONCHOSCOPY RIGID OR FLEXIBLE W/TRANSENDOSCOPIC ENDOBRONCHIAL ULTRASOUND GUIDED Bilateral 1/26/2022    Procedure: Right BRONCHOSCOPY, FIBEROPTIC, endobronchial ultrasound, pleural biopsy;  Surgeon: Dallin Agrawal MD;  Location: [facility] OR    INJECT BLOCK MEDIAL BRANCH CERVICAL/THORACIC/LUMBAR      INSERT CHEST TUBE Right 2/16/2022    Procedure: INSERTION, CATHETER, INTERCOSTAL, FOR DRAINAGE;  Surgeon: Dallin Agrawal MD;  Location: [facility] GI    INSERT CHEST TUBE Right 3/9/2022    Procedure: INSERTION, CATHETER, INTERCOSTAL, FOR DRAINAGE;  Surgeon: Sushila Antonio MD;  Location: [facility] GI    IR CHEST TUBE REMOVAL TUNNELED RIGHT  4/2/2022    OPTICAL TRACKING SYSTEM CRANIOTOMY, EXCISE TUMOR, COMBINED Left 6/28/2022    Procedure: Left stealth craniotomy for tumor resection with motor mapping;  Surgeon: Stephen Moran MD;  Location:  OR    OPTICAL TRACKING SYSTEM CRANIOTOMY, EXCISE TUMOR, COMBINED Right 6/27/2023    Procedure: Right stealth craniotomy and resection of tumor;  Surgeon: Stephen Moran MD;  Location:  OR    ORTHOPEDIC SURGERY      Ganesh. Rotator cuff repair.    PLEUROSCOPY N/A 1/26/2022    Procedure: Pleuroscopy with Pleural Biopsy;  Surgeon: Dallin Agrawal MD;  Location: [facility] OR       Physical Exam:   GENERAL APPEARANCE: robust young man, alert and no distress; neatly groomed  EYES: Eyes grossly normal to inspection, PERRLA, conjunctivae and sclerae without injection or discharge, EOM intact   RESP:  no increased work of breathing; speaks in complete sentences;   MS: No musculoskeletal defects are noted  SKIN: No suspicious lesions or rashes, hydration status appears adequate with normal skin turgor   PSYCH: Alert and oriented x3; speech- coherent , normal rate and volume; able to articulate logical thoughts, able to abstract reason, no tangential thoughts, no hallucinations or delusions, mentation  appears normal, Mood is euthymic. Affect is appropriate for this mood state and bright. Thought content is free of suicidal ideation, hallucinations, and delusions.  Eye contact is good during conversation.       Key Data Reviewed:  LABS: 2024- Cr 0.63, Albumin 4.1,  Hgb 14.3,      IMAGIN24- Brain MRI- Numerous new and increased size of intracranial metastatic lesions. Increased nodular enhancement about the medial aspect of the right frontal lobe resection margin with increased adjacent T2/FLAIR hyperintense signal although without elevated cerebral blood volume could represent posttreatment changes although disease progression could have a similar appearance. Stable left frontal lobe resection changes with stable underlying curvilinear enhancement.     2024 CT CAP W/CONTRAST  IMPRESSION:  1.  Stable exam without evidence for new disease.  2.  Stable irregular pulmonary nodule at the medial right lower lobe.       35 minutes spent on the date of the encounter doing chart review, history and exam, patient education & counseling, documentation and other activities as noted above.        Again, thank you for allowing me to participate in the care of your patient.      Sincerely,    Ajith Brewer MD

## 2024-08-02 ENCOUNTER — TELEPHONE (OUTPATIENT)
Dept: ONCOLOGY | Facility: CLINIC | Age: 44
End: 2024-08-02
Payer: COMMERCIAL

## 2024-08-02 NOTE — TELEPHONE ENCOUNTER
"I called Connor to follow up re: grade 3 blood pressure elevation. Connor took his BP during his lunch break BP is 159/111 (grade 2 systolic elevation and grade 3 diastolic elevation). Pt denies any symptoms of headache or shortness of breath. Sent high calin IB to care team to address. Connor restarted propranolol 20mg twice daily and lisinopril 40mg daily 2 days ago.     Hamida Muñoz PharmD  Hematology/Oncology Clinical Pharmacist  [facility]  [phone]    **The oral chemotherapy pharmacists are now working on provider-specific teams. Your pharmacist team can be reached at [facility] or [phone].**    8/2 2:44pm: Addend: [place] back from [place]: \"We need to get his BP lower so let's have him hold the lorlatinib and check in on Monday to see if BP improved over the weekend with holding. Then we can think about restarting with a third HTN (probably amlodipine) agent at same or reduced dose or lorlatinib. I would just Rx it today and have him start it but when I saw him Wed he had not yet started the two current HTN Rx from his PCP the day prior so I don't have confidence he would be able to promptly go get it and get started on it in the next few hours.     He previously had low BP with multiple BP agents while off TKI so he will need to check his BP daily over the weekend and hold both lisinopril and propanolol if SBP <100 or he is dizzy/LH. \"   I called Connor and communicated plan. Connor would like a call back on Monday to get BP reading. We will communicate back to team what the reading is.      "

## 2024-08-03 ENCOUNTER — MYC REFILL (OUTPATIENT)
Dept: PALLIATIVE CARE | Facility: CLINIC | Age: 44
End: 2024-08-03
Payer: COMMERCIAL

## 2024-08-03 DIAGNOSIS — D49.6 BRAIN TUMOR (H): ICD-10-CM

## 2024-08-03 DIAGNOSIS — Z98.890 S/P CRANIOTOMY: ICD-10-CM

## 2024-08-05 ENCOUNTER — VIRTUAL VISIT (OUTPATIENT)
Dept: PHARMACY | Facility: CLINIC | Age: 44
End: 2024-08-05
Attending: STUDENT IN AN ORGANIZED HEALTH CARE EDUCATION/TRAINING PROGRAM
Payer: COMMERCIAL

## 2024-08-05 ENCOUNTER — TELEPHONE (OUTPATIENT)
Dept: ONCOLOGY | Facility: CLINIC | Age: 44
End: 2024-08-05
Payer: COMMERCIAL

## 2024-08-05 DIAGNOSIS — I10 HTN, GOAL BELOW 140/90: ICD-10-CM

## 2024-08-05 DIAGNOSIS — E78.5 HYPERLIPIDEMIA LDL GOAL <100: ICD-10-CM

## 2024-08-05 DIAGNOSIS — I26.99 PULMONARY EMBOLISM, UNSPECIFIED CHRONICITY, UNSPECIFIED PULMONARY EMBOLISM TYPE, UNSPECIFIED WHETHER ACUTE COR PULMONALE PRESENT (H): ICD-10-CM

## 2024-08-05 DIAGNOSIS — F43.23 ADJUSTMENT DISORDER WITH MIXED ANXIETY AND DEPRESSED MOOD: ICD-10-CM

## 2024-08-05 DIAGNOSIS — C34.31 MALIGNANT NEOPLASM OF LOWER LOBE OF RIGHT LUNG (H): ICD-10-CM

## 2024-08-05 DIAGNOSIS — R51.9 NONINTRACTABLE EPISODIC HEADACHE, UNSPECIFIED HEADACHE TYPE: ICD-10-CM

## 2024-08-05 DIAGNOSIS — R56.9 SEIZURE (H): ICD-10-CM

## 2024-08-05 DIAGNOSIS — M79.10 MYALGIA: ICD-10-CM

## 2024-08-05 DIAGNOSIS — E11.65 TYPE 2 DIABETES MELLITUS WITH HYPERGLYCEMIA, WITH LONG-TERM CURRENT USE OF INSULIN (H): Primary | ICD-10-CM

## 2024-08-05 DIAGNOSIS — Z79.4 TYPE 2 DIABETES MELLITUS WITH HYPERGLYCEMIA, WITH LONG-TERM CURRENT USE OF INSULIN (H): Primary | ICD-10-CM

## 2024-08-05 PROCEDURE — 99607 MTMS BY PHARM ADDL 15 MIN: CPT | Mod: 95 | Performed by: PHARMACIST

## 2024-08-05 PROCEDURE — 99605 MTMS BY PHARM NP 15 MIN: CPT | Mod: 95 | Performed by: PHARMACIST

## 2024-08-05 RX ORDER — OXYCODONE HYDROCHLORIDE 10 MG/1
10 TABLET ORAL
Qty: 120 TABLET | Refills: 0 | Status: SHIPPED | OUTPATIENT
Start: 2024-08-09 | End: 2024-08-19

## 2024-08-05 NOTE — PROGRESS NOTES
"Medication Therapy Management (MTM) Encounter    ASSESSMENT:                            Medication Adherence/Access: No issues identified    Diabetes   Due to A1c being elevated, could consider addition of Glp-1 for blood glucose and weight benefit.   Also would benefit from resuming continuous monitor use.     Hypertension:   Seems that high blood pressure could be related to anxiety.       Mental Health   Anxiety  Patient could consider buspirone for help managing anxiety. Usually do not recommend taking both fluxetine and duloxetine due to duplicate therapy.     Hyperlipidemia   Rosuvastatin recently restarted. Will need to recheck lipids in October    Fatigue:  Stable.     Pain:   Stable.     NSC Lung cancer:  Stable.     HX of PE:  Stable.     Seizures:  Stable.     PLAN:                            Increase to the 25 mg Jardiance when you run out of the 10 mg daily  I will recommend to Dr. Jessica Mohr about a continuous monitor.   I will recommend a start of a GLP-1 with your primary care doctor.   Could consider adding on buspirone for anxiety instead of fluoxetine    Follow-up: Return in about 30 days (around 9/4/2024) for Follow up.    SUBJECTIVE/OBJECTIVE:                          Connor Emerosn is a 44 year old male seen for a transitions of care visit. He was discharged from [facility] on 7/25/24 for Syncope.      Reason for visit: RENETTA.    Allergies/ADRs: Reviewed in chart  Past Medical History: Reviewed in chart  Tobacco: He reports that he has never smoked. He has never been exposed to tobacco smoke. He has never used smokeless tobacco.  Alcohol: 1-3 beverages / week  Other Substance Use: THC - \"all the time\"    Stopped hydralazine hydrochlorothiazide lisinopril and propranolol  Medication Adherence/Access: no issues reported    Diabetes   Jardiance 10 mg daily  Metformin 500 mg twice a day - no diarrhea. Had diarrhea in the past before at higher doses.   Lantus 71 units daily  Not taking short acting insulin. " "    Patient is not experiencing side effects.  Blood sugar monitoring: occasionally; Ranges: (patient reported) unsure of ranges. Reports he had CGM at one point but doesn't know what happened to it.   Diet/Exercise: Reports he has been sugar free for about a week.     Lab Results   Component Value Date    UMALCR 48.19 (H) 06/19/2024      Lab Results   Component Value Date    A1C 10.6 06/19/2024    A1C 6.7 12/27/2023    A1C 8.3 04/06/2023    A1C 9.5 11/08/2022    A1C 12.3 06/24/2022    A1C 11.9 05/12/2020    A1C 6.3 08/10/2018    A1C 6.5 11/27/2017    A1C 6.4 06/29/2017    A1C 6.5 02/13/2017          Eye exam is up to date  Foot exam: due    Hypertension:   Lisinopril 40 mg daily  Propranolol 20 mg twice a day     He was having \"black outs\" due to low blood pressure not having any more  Patient reports no current medication side effects.  Patient self-monitors blood pressure.  Home BP monitoring this morning was 150/110 mmHg but was having some anxiety this morning. Reports when he is calm blood pressure will be 140/90 mmHg.     BP Readings from Last 3 Encounters:   08/01/24 (!) 159/103   07/31/24 (!) 159/103   07/30/24 (!) 170/121       Pulse Readings from Last 3 Encounters:   07/31/24 104   07/30/24 115   07/25/24 94             Mental Health   Anxiety  Duloxetine 30mg twice daily  Fluoxetine 20mg once daily.   Reportsa anxiety is \"really high right now.\"  Patient reports no current medication side effects.         Hyperlipidemia   rosuvastatin 5mg daily  Patient reports no significant myalgias or other side effects.  The 10-year ASCVD risk score (Oseas OLSEN, et al., 2019) is: 11.6%    Values used to calculate the score:      Age: 44 years      Sex: Male      Is Non- : No      Diabetic: Yes      Tobacco smoker: No      Systolic Blood Pressure: 159 mmHg      Is BP treated: Yes      HDL Cholesterol: 34 mg/dL      Total Cholesterol: 201 mg/dL           Fatigue:  Methylphenidate 5-10 mg twice a " "day as needed   Reports it is \"kind of working\" to help him stay awake.    Pain:   Acetaminophen 325 mg as needed  - not often  Methadone 7.5 mg twice a day   Oxycodone 10 mg every 3 hours    Reports that he is still \"sore\" because of chemo, but is working     NSC Lung cancer:  Albuterol as needed - hasn't used in the last month.   Lobrena 100 mg daily    No concerns or questions at this time.     HX of PE:  Xarelto 20 mg daily with dinner.    No bleeding issues. No concerns or questions at this time.     Seizures:  Levetiracetam 1000 mg twice a day     Last seizure was a year ago.     Today's Vitals: There were no vitals taken for this visit.  ----------------  Post Discharge Medication Reconciliation Status: discharge medications reconciled, continue medications without change.    I spent 30 minutes with this patient today. All changes were made via collaborative practice agreement with Bassam Persaud MD. A copy of the visit note was provided to the patient's provider(s).    A summary of these recommendations was sent via [employer].  Vito Enamorado, Pharm. D., [facility]  Medication Therapy Management Pharmacist     Telemedicine Visit Details  Type of service:  Telephone visit  Start Time:  2 pm  End Time:  2:30 pm     Medication Therapy Recommendations  Anxiety    Current Medication: FLUoxetine (PROZAC) 20 MG capsule   Rationale: More effective medication available - Ineffective medication - Effectiveness   Recommendation: Change Medication   Status: Contact Provider - Awaiting Response         Type 2 diabetes mellitus with hyperglycemia, with long-term current use of insulin (H)    Rationale: Synergistic therapy - Needs additional medication therapy - Indication   Recommendation: Start Medication - Ozempic (0.25 or 0.5 MG/DOSE) 2 MG/1.5ML Sopn   Status: Contact Provider - Awaiting Response            "

## 2024-08-05 NOTE — Clinical Note
Hello,  I conducted a hospital follow-up medication review with Connor. Some recommendations would be to consider GLP-1 initiation after titrating the SGLT-I and to consider replacing fluoxetine with buspirone due to duplicate therapy with duloxetine.  Please let me know what questions you have for me,  Vito Enamorado, Pharm. D., [facility] Medication Therapy Management Pharmacist

## 2024-08-05 NOTE — TELEPHONE ENCOUNTER
Received Ensynt message from patient requesting refill of oxycodone.     Last refill: 7/26/24  Last office visit: 8/1/24  Scheduled for follow up per check out request.     Will route request to NP for review.     Reviewed [place]  Report.

## 2024-08-05 NOTE — ORAL ONC MGMT
Oral Chemotherapy Monitoring Program     Placed call to patient in follow up of oral chemotherapy. Was unable to leave a voice mail due to mail box being full. Need to get blood pressure readings as his lorlatinib is on hold until his blood pressure lowers. Will update when response received.     Siena Doran, [facility]  Oral Chemotherapy Monitoring Program  [facility]   [phone]

## 2024-08-06 ENCOUNTER — TELEPHONE (OUTPATIENT)
Dept: ONCOLOGY | Facility: CLINIC | Age: 44
End: 2024-08-06
Payer: COMMERCIAL

## 2024-08-06 DIAGNOSIS — C34.31 MALIGNANT NEOPLASM OF LOWER LOBE OF RIGHT LUNG (H): Primary | ICD-10-CM

## 2024-08-06 RX ORDER — AMLODIPINE BESYLATE 5 MG/1
5 TABLET ORAL DAILY
Qty: 60 TABLET | Refills: 1 | Status: SHIPPED | OUTPATIENT
Start: 2024-08-06 | End: 2024-08-30

## 2024-08-06 NOTE — ORAL ONC MGMT
"Oral Chemotherapy Monitoring Program     Placed call to patient in follow up of blood pressure from over the weekend. Patient states it has been \"good\" with his last reading being 135/90. In basket to be sent to the care team.    Lance Good PharmD  [facility]   [phone]   8/6/2024     "

## 2024-08-06 NOTE — ORAL ONC MGMT
Oral Chemotherapy Monitoring Program     Placed call to patient in follow up of high blood pressure. Connor was counseled on amlodipine and made aware it can be picked up at his local pharmacy in [place].     Lance Good PharmD  [facility]   [phone]   8/6/2024

## 2024-08-08 ENCOUNTER — MYC REFILL (OUTPATIENT)
Dept: RADIATION ONCOLOGY | Facility: CLINIC | Age: 44
End: 2024-08-08
Payer: COMMERCIAL

## 2024-08-08 DIAGNOSIS — C34.90 NON-SMALL CELL LUNG CANCER, UNSPECIFIED LATERALITY (H): ICD-10-CM

## 2024-08-08 DIAGNOSIS — R53.0 NEOPLASTIC MALIGNANT RELATED FATIGUE: ICD-10-CM

## 2024-08-08 DIAGNOSIS — C79.31 MALIGNANT NEOPLASM METASTATIC TO BRAIN (H): ICD-10-CM

## 2024-08-08 RX ORDER — METHYLPHENIDATE HYDROCHLORIDE 5 MG/1
5-10 TABLET ORAL 2 TIMES DAILY PRN
Qty: 90 TABLET | Refills: 0 | Status: SHIPPED | OUTPATIENT
Start: 2024-08-08

## 2024-08-08 NOTE — PATIENT INSTRUCTIONS
"Recommendations from today's MTM visit:                                                    MTM (medication therapy management) is a service provided by a clinical pharmacist designed to help you get the most of out of your medicines.   Today we reviewed what your medicines are for, how to know if they are working, that your medicines are safe and how to make your medicine regimen as easy as possible.      Increase to the 25 mg Jardiance when you run out of the 10 mg daily  I will recommend to Dr. Jessica Mohr about a continuous monitor.   I will recommend a start of a GLP-1 with your primary care doctor.   Could consider adding on buspirone for anxiety instead of fluoxetine    Follow-up: Return in about 30 days (around 9/4/2024) for Follow up.    It was great speaking with you today.  I value your experience and would be very thankful for your time in providing feedback in our clinic survey. In the next few days, you may receive an email or text message from [employer] with a link to a survey related to your  clinical pharmacist.\"     To schedule another MTM appointment, please call the clinic directly or you may call the MTM scheduling line at [phone] or toll-free at [phone].     My Clinical Pharmacist's contact information:                                                      Please feel free to contact me with any questions or concerns you have.      Vito Enamorado, Pharm. D., [facility]  Medication Therapy Management Pharmacist    "

## 2024-08-08 NOTE — TELEPHONE ENCOUNTER
Received [employer]t message from patient requesting refill of methylphenidate.     Last refill: 6/7/24  Last office visit: 8/1/24  Scheduled for follow up pending, msg sent to pt.     Will route request to NP for review.     Reviewed [place]  Report.

## 2024-08-09 DIAGNOSIS — C34.31 MALIGNANT NEOPLASM OF LOWER LOBE OF RIGHT LUNG (H): Primary | ICD-10-CM

## 2024-08-09 DIAGNOSIS — I67.89 RADIATION THERAPY INDUCED BRAIN NECROSIS: ICD-10-CM
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
## 2024-08-12 ENCOUNTER — MYC MEDICAL ADVICE (OUTPATIENT)
Dept: ONCOLOGY | Facility: CLINIC | Age: 44
End: 2024-08-12
Payer: COMMERCIAL

## 2024-08-12 DIAGNOSIS — M79.10 MYALGIA: ICD-10-CM

## 2024-08-12 RX ORDER — CYCLOBENZAPRINE HCL 5 MG
10 TABLET ORAL
Qty: 30 TABLET | Refills: 4 | Status: SHIPPED | OUTPATIENT
Start: 2024-08-12

## 2024-08-12 NOTE — CONFIDENTIAL NOTE
Per Dr Brewer: I recommend we confirm he is using his ritalin for fatigue.  Per my last note Connor can be on 10 mg on first rising and 10 mg at noon. (I worry though this could contribute to his nocturnal restlessness).  Also he can increase his HS flexeril to 10 mg which should create some drowsiness and help with his muscle discomfort..     Call placed to Connor unable to leave message   Is taking ritalin at 7:50 and again at noon.   Has not been utilizing flexeril as this was not on his current med list. States he will need a new prescription for flexeril sent to the pharmacy.   Blood sugars have not been checking them in a bit. Still utilizing the insulin. He will check his BS for the next few days to see where it has been running.

## 2024-08-12 NOTE — CONFIDENTIAL NOTE
"Oncology Nurse Triage - Reporting Symptoms    Situation:   Connor reporting the following symptoms:lack of energy and muscle ache blurry vision up close       Background:   Treating Provider:   Dr Persaud     Date of last office visit: 7/31/24 Chelsea Villegas     Recent treatments: Yes: lorlatinib       Assessment  Onset of symptoms:   Lack of energy thinks it his chemo, has no energy bought vitamins and non sugar energy drinks to help get him going today, feels like he does not have energy to do anything. Is sleeping less than 6 hours per night is very restless, toss and turns at night. Does not have any sleeping meds that he currently uses. Use to take ambien prescribed by DR Brewer in Palliative care.     Muscle aches, throughout whole body. Taking his oxycodone and that is not helping at all. It is more his big muscles that ache, his legs and arms. Not his hands and feet. Just feels sore no spasms. Taking oxycodone 2 tabs three times daily.He has not added tylenol into that but he can try it. Can also try heat to the muscles that are hurting him with a heating blanket or pad.     Eyesight, \"Is crazy bad\". Can't read his phone. Unable to read a book or magazine. Wears contacts /glasses. Last eye exam was a couple of months ago. No changes to prescription at that time. No headaches. Eyes have been watering more. Has been using lubricating eye drops to see if that would help and no help from the eye drops at present. No floaters. No numbness or tingling in upper or lower extremities.   No nausea. Eating OK when he has an appetite.     Has an appt with Chelsea Villegas on 8/16/24.     Best Pharmacy is [facility].     Recommendations:            "

## 2024-08-15 ENCOUNTER — MEDICAL CORRESPONDENCE (OUTPATIENT)
Dept: PALLIATIVE CARE | Facility: CLINIC | Age: 44
End: 2024-08-15
Payer: COMMERCIAL

## 2024-08-16 ENCOUNTER — TELEPHONE (OUTPATIENT)
Dept: ONCOLOGY | Facility: CLINIC | Age: 44
End: 2024-08-16

## 2024-08-16 ENCOUNTER — PATIENT OUTREACH (OUTPATIENT)
Dept: ONCOLOGY | Facility: CLINIC | Age: 44
End: 2024-08-16
Payer: COMMERCIAL

## 2024-08-16 NOTE — TELEPHONE ENCOUNTER
Patient needs to be rescheduled for their virtual visit due to Reason for Reschedule: No-Show    Scheduling team, please refer to service line late cancellation/no-show policies and reach out to patient at a later date for rescheduling.    Appointment mode: Video  Provider: Chelsea Villegas, CNP

## 2024-08-19 ENCOUNTER — PATIENT OUTREACH (OUTPATIENT)
Dept: ONCOLOGY | Facility: CLINIC | Age: 44
End: 2024-08-19
Payer: COMMERCIAL

## 2024-08-19 ENCOUNTER — MYC REFILL (OUTPATIENT)
Dept: PALLIATIVE CARE | Facility: CLINIC | Age: 44
End: 2024-08-19
Payer: COMMERCIAL

## 2024-08-19 DIAGNOSIS — D49.6 BRAIN TUMOR (H): ICD-10-CM

## 2024-08-19 DIAGNOSIS — Z98.890 S/P CRANIOTOMY: ICD-10-CM

## 2024-08-19 NOTE — TELEPHONE ENCOUNTER
[facility]: Cancer Care                                                                                          Pt no-showed 8/16 virtual visit with Chelsea Nelly JULIO. Next scheduled follow-up is 9/4 with Dr. Persaud, prior MRI/labs 8/28. Mychart sent by [facility] unread 8/16.     VM box full, unable to leave message.    Signature:  Harjinder Tapia RN

## 2024-08-20 ENCOUNTER — TELEPHONE (OUTPATIENT)
Dept: ONCOLOGY | Facility: CLINIC | Age: 44
End: 2024-08-20
Payer: COMMERCIAL

## 2024-08-20 ENCOUNTER — MYC REFILL (OUTPATIENT)
Dept: ONCOLOGY | Facility: CLINIC | Age: 44
End: 2024-08-20
Payer: COMMERCIAL

## 2024-08-20 DIAGNOSIS — D49.6 BRAIN TUMOR (H): ICD-10-CM

## 2024-08-20 LAB — LAB GUARDANT ONC MSI-HIGH: NORMAL

## 2024-08-20 RX ORDER — OXYCODONE HYDROCHLORIDE 10 MG/1
10 TABLET ORAL
Qty: 120 TABLET | Refills: 0 | Status: SHIPPED | OUTPATIENT
Start: 2024-08-22 | End: 2024-09-03

## 2024-08-20 RX ORDER — LEVETIRACETAM 1000 MG/1
1000 TABLET ORAL 2 TIMES DAILY
Qty: 60 TABLET | Refills: 11 | Status: SHIPPED | OUTPATIENT
Start: 2024-08-20

## 2024-08-20 NOTE — ORAL ONC MGMT
Oral Chemotherapy Monitoring Program     Placed call to patient in follow up of oral chemotherapy for blood pressure check. Unable to leave voicemail, voicemail box full. Will update when response received.     Celso Nichols, PharmD  Hematology/Oncology Clinical Pharmacist  [facility]  [facility]

## 2024-08-20 NOTE — TELEPHONE ENCOUNTER
Medication requested: levetiracetam 1000 mg tablet    Last prescribing provider: ERIC Monreal    Last clinic visit date: 7/31/24 with Chelsea Villegas CNP    Recommendations for requested medication (if none, N/A): NA    Any other pertinent information (if none, N/A): NA    Refilled: Y/N, if NO, why?    Pended and Routed to Bassam Persaud MD

## 2024-08-20 NOTE — TELEPHONE ENCOUNTER
Received [employer] message from patient requesting refill of oxycodone.     Last refill: 8/8/24  Last office visit: 8/1/24  Scheduled for follow up 10/21/24     Will route request to MD/ for review.     Reviewed [place]  Report.

## 2024-08-27 ENCOUNTER — MYC MEDICAL ADVICE (OUTPATIENT)
Dept: ONCOLOGY | Facility: CLINIC | Age: 44
End: 2024-08-27
Payer: COMMERCIAL

## 2024-08-28 ENCOUNTER — HOSPITAL ENCOUNTER (OUTPATIENT)
Dept: CARDIOLOGY | Facility: CLINIC | Age: 44
Discharge: HOME | End: 2024-08-28
Attending: STUDENT IN AN ORGANIZED HEALTH CARE EDUCATION/TRAINING PROGRAM
Payer: COMMERCIAL

## 2024-08-28 ENCOUNTER — LAB (OUTPATIENT)
Dept: LAB | Facility: CLINIC | Age: 44
End: 2024-08-28
Attending: NURSE PRACTITIONER
Payer: COMMERCIAL

## 2024-08-28 ENCOUNTER — HOSPITAL ENCOUNTER (OUTPATIENT)
Dept: MRI IMAGING | Facility: CLINIC | Age: 44
Discharge: HOME | End: 2024-08-28
Attending: NURSE PRACTITIONER
Payer: COMMERCIAL

## 2024-08-28 DIAGNOSIS — C34.90 PRIMARY MALIGNANT NEOPLASM OF LUNG METASTATIC TO OTHER SITE, UNSPECIFIED LATERALITY (H): ICD-10-CM

## 2024-08-28 DIAGNOSIS — C34.90: ICD-10-CM

## 2024-08-28 DIAGNOSIS — Z79.899 ENCOUNTER FOR LONG-TERM (CURRENT) USE OF MEDICATIONS: ICD-10-CM

## 2024-08-28 DIAGNOSIS — Z79.899 ENCOUNTER FOR LONG-TERM (CURRENT) USE OF HIGH-RISK MEDICATION: ICD-10-CM

## 2024-08-28 DIAGNOSIS — Z79.899 ENCOUNTER FOR LONG-TERM (CURRENT) USE OF HIGH-RISK MEDICATION: Primary | ICD-10-CM

## 2024-08-28 DIAGNOSIS — C79.31 MALIGNANT NEOPLASM METASTATIC TO BRAIN (H): ICD-10-CM

## 2024-08-28 LAB
ALBUMIN SERPL BCG-MCNC: 4.1 G/DL (ref 3.5–5.2)
ALP SERPL-CCNC: 118 U/L (ref 40–150)
ALT SERPL W P-5'-P-CCNC: 26 U/L (ref 0–70)
ANION GAP SERPL CALCULATED.3IONS-SCNC: 12 MMOL/L (ref 7–15)
AST SERPL W P-5'-P-CCNC: 19 U/L (ref 0–45)
BASOPHILS # BLD AUTO: 0 10E3/UL (ref 0–0.2)
BASOPHILS NFR BLD AUTO: 0 %
BILIRUB SERPL-MCNC: 0.2 MG/DL
BUN SERPL-MCNC: 13 MG/DL (ref 6–20)
CALCIUM SERPL-MCNC: 8.9 MG/DL (ref 8.8–10.4)
CHLORIDE SERPL-SCNC: 105 MMOL/L (ref 98–107)
CHOLEST SERPL-MCNC: 286 MG/DL
CREAT SERPL-MCNC: 0.73 MG/DL (ref 0.67–1.17)
EGFRCR SERPLBLD CKD-EPI 2021: >90 ML/MIN/1.73M2
EOSINOPHIL # BLD AUTO: 0.5 10E3/UL (ref 0–0.7)
EOSINOPHIL NFR BLD AUTO: 6 %
ERYTHROCYTE [DISTWIDTH] IN BLOOD BY AUTOMATED COUNT: 13.1 % (ref 10–15)
FASTING STATUS PATIENT QL REPORTED: YES
GLUCOSE SERPL-MCNC: 417 MG/DL (ref 70–99)
HCO3 SERPL-SCNC: 25 MMOL/L (ref 22–29)
HCT VFR BLD AUTO: 41.1 % (ref 40–53)
HDLC SERPL-MCNC: 43 MG/DL
HGB BLD-MCNC: 13.8 G/DL (ref 13.3–17.7)
IMM GRANULOCYTES # BLD: 0 10E3/UL
IMM GRANULOCYTES NFR BLD: 0 %
LDLC SERPL CALC-MCNC: 168 MG/DL
LYMPHOCYTES # BLD AUTO: 2 10E3/UL (ref 0.8–5.3)
LYMPHOCYTES NFR BLD AUTO: 22 %
MAGNESIUM SERPL-MCNC: 1.8 MG/DL (ref 1.7–2.3)
MCH RBC QN AUTO: 31.1 PG (ref 26.5–33)
MCHC RBC AUTO-ENTMCNC: 33.6 G/DL (ref 31.5–36.5)
MCV RBC AUTO: 93 FL (ref 78–100)
MONOCYTES # BLD AUTO: 0.8 10E3/UL (ref 0–1.3)
MONOCYTES NFR BLD AUTO: 9 %
NEUTROPHILS # BLD AUTO: 5.8 10E3/UL (ref 1.6–8.3)
NEUTROPHILS NFR BLD AUTO: 63 %
NONHDLC SERPL-MCNC: 243 MG/DL
NRBC # BLD AUTO: 0 10E3/UL
NRBC BLD AUTO-RTO: 0 /100
PHOSPHATE SERPL-MCNC: 3.1 MG/DL (ref 2.5–4.5)
PLATELET # BLD AUTO: 139 10E3/UL (ref 150–450)
POTASSIUM SERPL-SCNC: 4 MMOL/L (ref 3.4–5.3)
PROT SERPL-MCNC: 6.2 G/DL (ref 6.4–8.3)
RBC # BLD AUTO: 4.44 10E6/UL (ref 4.4–5.9)
SODIUM SERPL-SCNC: 142 MMOL/L (ref 135–145)
TRIGL SERPL-MCNC: 375 MG/DL
WBC # BLD AUTO: 9.2 10E3/UL (ref 4–11)

## 2024-08-28 PROCEDURE — 83735 ASSAY OF MAGNESIUM: CPT

## 2024-08-28 PROCEDURE — 93005 ELECTROCARDIOGRAM TRACING: CPT

## 2024-08-28 PROCEDURE — 82040 ASSAY OF SERUM ALBUMIN: CPT

## 2024-08-28 PROCEDURE — 82465 ASSAY BLD/SERUM CHOLESTEROL: CPT

## 2024-08-28 PROCEDURE — 255N000002 HC RX 255 OP 636: Performed by: NURSE PRACTITIONER

## 2024-08-28 PROCEDURE — A9585 GADOBUTROL INJECTION: HCPCS | Performed by: NURSE PRACTITIONER

## 2024-08-28 PROCEDURE — 85025 COMPLETE CBC W/AUTO DIFF WBC: CPT

## 2024-08-28 PROCEDURE — 36415 COLL VENOUS BLD VENIPUNCTURE: CPT

## 2024-08-28 PROCEDURE — 93010 ELECTROCARDIOGRAM REPORT: CPT | Performed by: INTERNAL MEDICINE

## 2024-08-28 PROCEDURE — 70553 MRI BRAIN STEM W/O & W/DYE: CPT

## 2024-08-28 PROCEDURE — 84100 ASSAY OF PHOSPHORUS: CPT

## 2024-08-28 RX ORDER — GADOBUTROL 604.72 MG/ML
15 INJECTION INTRAVENOUS ONCE
Status: COMPLETED | OUTPATIENT
Start: 2024-08-28 | End: 2024-08-28

## 2024-08-28 RX ADMIN — GADOBUTROL 15 ML: 604.72 INJECTION INTRAVENOUS at 12:53

## 2024-08-29 LAB
ATRIAL RATE - MUSE: 91 BPM
DIASTOLIC BLOOD PRESSURE - MUSE: NORMAL MMHG
INTERPRETATION ECG - MUSE: NORMAL
P AXIS - MUSE: 37 DEGREES
PR INTERVAL - MUSE: 156 MS
QRS DURATION - MUSE: 82 MS
QT - MUSE: 382 MS
QTC - MUSE: 469 MS
R AXIS - MUSE: 30 DEGREES
SYSTOLIC BLOOD PRESSURE - MUSE: NORMAL MMHG
T AXIS - MUSE: 48 DEGREES
VENTRICULAR RATE- MUSE: 91 BPM

## 2024-08-30 ENCOUNTER — VIRTUAL VISIT (OUTPATIENT)
Dept: ONCOLOGY | Facility: CLINIC | Age: 44
End: 2024-08-30
Attending: NURSE PRACTITIONER
Payer: COMMERCIAL

## 2024-08-30 ENCOUNTER — MYC MEDICAL ADVICE (OUTPATIENT)
Dept: FAMILY MEDICINE | Facility: CLINIC | Age: 44
End: 2024-08-30
Payer: COMMERCIAL

## 2024-08-30 ENCOUNTER — PATIENT OUTREACH (OUTPATIENT)
Dept: ONCOLOGY | Facility: CLINIC | Age: 44
End: 2024-08-30
Payer: COMMERCIAL

## 2024-08-30 VITALS — HEIGHT: 69 IN | BODY MASS INDEX: 32.14 KG/M2 | WEIGHT: 217 LBS

## 2024-08-30 DIAGNOSIS — C34.31 MALIGNANT NEOPLASM OF LOWER LOBE OF RIGHT LUNG (H): Primary | ICD-10-CM

## 2024-08-30 DIAGNOSIS — E11.65 TYPE 2 DIABETES MELLITUS WITH HYPERGLYCEMIA, WITHOUT LONG-TERM CURRENT USE OF INSULIN (H): Primary | ICD-10-CM

## 2024-08-30 DIAGNOSIS — E78.1 HYPERTRIGLYCERIDEMIA: ICD-10-CM

## 2024-08-30 DIAGNOSIS — C79.31 MALIGNANT NEOPLASM METASTATIC TO BRAIN (H): ICD-10-CM

## 2024-08-30 PROCEDURE — G2211 COMPLEX E/M VISIT ADD ON: HCPCS | Mod: 95 | Performed by: NURSE PRACTITIONER

## 2024-08-30 PROCEDURE — 99215 OFFICE O/P EST HI 40 MIN: CPT | Mod: 95 | Performed by: NURSE PRACTITIONER

## 2024-08-30 RX ORDER — ROSUVASTATIN CALCIUM 10 MG/1
10 TABLET, COATED ORAL DAILY
Qty: 90 TABLET | Refills: 2 | Status: SHIPPED | OUTPATIENT
Start: 2024-08-30

## 2024-08-30 RX ORDER — AMLODIPINE BESYLATE 10 MG/1
10 TABLET ORAL DAILY
Qty: 90 TABLET | Refills: 0 | Status: SHIPPED | OUTPATIENT
Start: 2024-08-30

## 2024-08-30 ASSESSMENT — PAIN SCALES - GENERAL: PAINLEVEL: NO PAIN (0)

## 2024-08-30 NOTE — PROGRESS NOTES
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 Arango before, but not some time. Please evaluate for radiation to enlarging brain mets.    Referral updates and Plan:     Last seen by Dr. Arango 02/15/2022 so is considered an established pt. Message sent to [facility] team.     Per 9/4 inbasket message:   Chelsea Villegas CNP Reynolds, Margaret A, MD  Cc: Chelsea Cook; Loli Castillo, ZAHIDA; Bassam Persaud MD; P Rad Onc [facility]; Celso Ya RN  Hi Dr. Arango, thank you for reviewing and for the message. I will order another brain MRI for 3 mo.    Jamari, can you please update Connor with plan after Dr. Borrego's review? Next follow up with us in 4 weeks should suffice in the meantime, he was doing well when I talked to him last week.    VINH CabreraN, RN, OCN  [facility] Oncology Nurse Navigator  [phone] / [phone]

## 2024-08-30 NOTE — TELEPHONE ENCOUNTER
Routing to PCP. Please see [facility]. Pt asking about CGM. I have T'd up order if ok.    Bette Maza RN on 8/30/2024 at 2:36 PM

## 2024-08-30 NOTE — NURSING NOTE
Current patient location: [address] [place] [address]   [facility] [id number]    Is the patient currently in the state of [place]? YES    Visit mode:VIDEO    If the visit is dropped, the patient can be reconnected by: VIDEO VISIT: Text to cell phone:   Telephone Information:   Mobile [phone]       Will anyone else be joining the visit? NO  (If patient encounters technical issues they should call [phone])    How would you like to obtain your AVS? MyChart    Are changes needed to the allergy or medication list? No    Are refills needed on medications prescribed by this physician? NO    Rooming Documentation:  Questionnaire(s) not done per department protocol      Reason for visit: RADU OCONNOR

## 2024-08-30 NOTE — PROGRESS NOTES
Virtual Visit Details    Type of service:  Video Visit   Video Start Time: 1:15 PM  Video End Time:1:33 PM    Originating Location (pt. Location): Home    Distant Location (provider location):  Off-site  Platform used for Video Visit: [facility] ONCOLOGY FOLLOW UP NOTE    PATIENT NAME: Connor Emerson  ENCOUNTER DATE: August 30, 2024      Care Team  Primary Oncologist: Bassam Persaud MD    REASON FOR CURRENT VISIT: F/u of lung cancer    HISTORY OF PRESENT ILLNESS:  Mr. Connor Emerson is a 44 year old  male who is a non-smoker with PMHx of T2DM, HTN with metastatic NSCLC comes for follow up     Oncologic Hx:    Diagnosis:   Stage IV NSCLC, Rt lung adenocarcinoma with metastasis to pleura, mediastinum , rt pleural effusion and brain diagnosed 1/2022 (AJCC 8th edition)  PD-L1 TPS 2-3% by [place]   NGS [facility] panel-EML4:ALK rearragement  NGS Guardant- GNAS R201H, KRAS K5E- No ALK    Treatment:   7/10/24-current: Lolatinib 100 mg daily    2/23/2022- 6/2024: Brigatinib. Dose reduced to 60 mg due to cough after two days of 90 mg daily -->back on 90 mg---> increased to 180 mg  ---> settled on 120 mg    6/27/23- Right stealth craniotomy and resection of tumor:     7/20/23- 11/14/24: Bevacizumab for radiation necrosis. Discontinued due to severe HTN.    Past:  2/15/22- GK to 11 brain lesions  3/2/22- 12/2022 Alectinib 300 mg BID (Dose reduced to 450 mg BID from 600 mg BID due to grade 3 myalgias 3/21/22, again reduced to 300 mg BID 9/28/22)  6/28/22- Craniotomy, resection  9/28/22-10/26- Bevacizumab for radiation necrosis (stopped due to PE)      Intent of treatment: Palliative    Oncologic course:  1/19/22 to 1/22/22-Admitted to [facility] for 2 week progressive SOB secondary to have large rt sided pleural effusion, needing thoracentesis x2 (1.7L and 2.0 L removed), cytology positive for malignancy, adenocarcinoma.   1/26/22- Rt pleural mass biopsy-Dr. Agrawal--POSITIVE FOR ADENOCARCINOMA CONSISTENT WITH LUNG PRIMARY,  admixed with mesothelial hyperplasia and inflammatory infiltrate (+ TTF-1 and CK 7;  negative  p40, calretinin and WT-1. PAX8 immunostain focal +). 4th thoracentesis done simultaneously - 3L approx removed.   2/1/22- PET/CT-Right lower lobe central infiltrative FDG avid 8.2 x 9.6 cm mass representing a primary lung adenocarcinoma. Ipsilateral right perihilar, bilateral pretracheal, subcarinal and superior mediastinal michele metastases. Contralateral mildly FDG avid few lung nodules are suspicious for contralateral metastasis. At least 3 intracranial metastases in the right frontal lobe, left frontal lobe and left cerebellar hemisphere. Nonspecific mild diffuse bone marrow uptake. Further evaluation with a spine MRI could be considered to rule out early marrow infiltration. This could also be seen with red marrow conversion.  2/5/22-  Brain MRI- At least 9 intracranial metastases as detailed above. The dominant lesions involving the orbital right frontal lobe, the posterior left middle frontal gyrus, anterior right temporal lobe and in the left cerebellar hemisphere have surrounding moderate vasogenic type edema.  2/15/22- Saw Dr. Arango from Rad Onc- Rcd GK to 12 lesion in bran  2/16/22- Pleurex placement   3/2/22- Started Alectinib 600 mg BID  3/21/22- Dose reduced to 450 mg BID due to grade 3 myalgias and fatigue  4/2/22 to 4/5/22- Admitted at [facility] for- Severe sepsis due to MSSA infection of right PleurX catheter s/p removal- He presented with onset of pain at tube site starting 4/1; at arrival was tachycardic with leukocytosis (22.7) and elevated lactic acid (2.9).  CT chest showed fluid and stranding tracking outside the pleural space into chest wall along pleural catheter.  IR was consulted and removed catheter 4/2 with report of pustular drainage and tip culture growing MSSA.  Thoracic Surg was consulted who felt no surgical indication necessary given minimal pleural fluid and lack of any signs of  abscess.  Initially treated with broad spectrum coverage for sepsis, narrowed to Ancef once sensitivities returned with plan to transition to cefadroxil for an additional 10 days at discharge per ID. Held drug 4/2 to 4/11 5/2/22- CT CAP- Overall, positive response to therapy with decreased size of right lower lobe and right pleural-based masses, pulmonary metastases, hilar and mediastinal lymphadenopathy. However, a single right posterior pleural-based mass has slightly increased in size since 2/24/2022. No metastatic disease in the abdomen and pelvis. Right Pleurx catheter has been removed. Trace right pleural effusion and right basilar atelectasis.  5/2/22- Brain MRI- The previously demonstrated brain metastases are mildly diminished in size versus to 2/5/2022. The degree of edema is also diminished but not completely resolved. Probable trace amounts of intralesional bleeding demonstrated on the gradient sequence within the metastases. No definite new metastasis or progressive mass effect. No hydrocephalus or infarct.    6/15/22 to 6/17/22- Admitted at [facility]-with aphasia and word finding difficulty over last few weeks.  He presented to [facility] ED on 6/10 for evaluation of his symptoms. MRI brain showed multiple intracranial metastases, with interval enlargement of the dominant lesion within the left frontal lobe and increased surrounding vasogenic edema with 2 mm rightward shift of the septum pellucidum. Due to his worsening anxiety, he left AMA. His symptoms continued to progress to where he could not write at work so he decided to go to the ED for re-evaluation and treatment. Evaluated by NSGY, Rad Onc (radiaiton necrosis vs tumor progression).  6/16/22- MR Brain (6/16) shows multiple intracranial metastases, with interval enlargement  of the dominant lesion within the left frontal lobe and increased surrounding vasogenic edema with 2 mm rightward shift of the septum pellucidum.  6/16/22- - CT CAP shows slightly  decreased size of right lower lobe and right pleural-based masses. No new pulmonary nodules or lymphadenopathy; No evidence of metastatic disease in the abdomen or pelvis.   6/28/22 to 6/30/22- Admitted at [facility]- Elective left Stealth craniotomy with resection of brain tumor due to ongoing symptoms. No intraoperative complications. EBL 50 ml.  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  7/5/22- Ct CAP- Right lower lobe low-density nodules are not significantly changed. A small left upper lobe pulmonary nodule is also unchanged. Trace pleural fluid on the right has increased slightly. No convincing evidence for metastatic disease in the abdomen or pelvis.  7/18/22 to 7/19/22- Admitted to [facility] for seizure- Reportedly was only taking once Keppra instead of twice daily. Also resumed on dexamethasone 2 mg daily  8/1/22- Brain MRI- Redemonstrated postsurgical changes status post left frontoparietal Craniotomy. Interval increase in size of the dominant ring-enhancing lesion in the left posterior superior frontal lobe with increased moderate surrounding vasogenic edema and local mass effect resulting in narrowing of the supratentorial ventricular system. No significant midline shift/herniation at this time    8/1/22- Dex was increased to 4 mg daily by Dr. Moran    9/1/22- CT Chest- Near resolution of previously seen right pleural nodule. Stable right lower lobe pulmonary nodule    9/28/22- Bevacizumab for radiation necrosis    10/26/22- Bevacizumab     10/26/22- Ct CAP- Stable posterior medial right lower lobe 1.9 x 1.1 cm nodule series 8 image 176. Adjacent stable scarring and atelectasis. The previously noted pleural nodule posteriorly on the right is not currently clearly identified. Stable left upper lobe 0.3 cm nodule image 56    10/26/22- Brain MRI- Overall improved appearance of multiple intracranial metastases with near resolution of associated edema and diminished enhancement and size of multiple residual  lesions. No definite new or progressive metastasis.    11/7/22 to 11/9/22- Admitted for PE and HTN urgency- Small pulmonary embolism in the right lower lobe pulmonary artery. started on Lovenox, Brain MRI neg for PRES.    12/29/22- ED visit- bilateral hip pain, pain in shoulders, knuckles, knees, and ankles- holding alectinib since 12/20/22 1/6/23- Ct CAP- Mild groundglass nodularity in the left upper lobe is new since the previous exam, and may be infectious in etiology. No other significant interval change. Pulmonary nodules are not significantly changed.    1/6/23- Brain MRI- Stable to diminished sequelae of intracranial metastasis and treatment changes. No new or progressive metastasis. No superimposed acute intracranial finding.     2/23/2022- Start Brigatinib. Dose reduced to 60 mg due to cough after two days of 90 mg daily -  3/23/23-Brigatinib  (increase to 90 mg)    4/20/23- CT CAP- Stable- Previously noted mild groundglass nodularity in the left upper lobe has resolved.Pulmonary nodules are unchanged.Trace amount pleural fluid on the right has decreased slightly.    4/20/23- Brain MRI- Possile progression- Largest metastasis within the right frontal lobe has increased in size with worsening peripheral nodular enhancement and worsening vasogenic edema contributing to new right to left midline shift.  Multiple new/enlarging metastases scattered throughout the cerebral hemispheres and cerebellum. Started on dexamethasone by NGS on 4/28/23 5/17/23- presents to clinic with glucose 627, admission to hospital for stability on insulin drip    6/16/23- Brain MRI- Interval increase in size of the necrotic lesion in the anterior aspect of the right frontal lobe from 2.4 x 2.3 x 2.4 cm previously to 3.0 x 3.5 x 2.8 cm on the current study. Mass effect due to slightly increased vasogenic edema surrounding the right frontal lesion resulted in increase of leftward midline shift of the anterior interhemispheric  fissure from 0.7 cm previously to 0.9 cm currently. Interval increase in size in two adjacent metastases at the frontoparietal junction on the left. The remaining scattered intra-axial enhancing nodules are not changed appreciably in size or appearance since the comparison study.No evidence for acute intracranial pathology.   Dr. Moran- Due to worsening headaches and more problems with walking. Recommended a right Stealth craniotomy for resection of the lesion.     6/27/23- Right stealth craniotomy and resection of tumor: Final path: radiation necrosis    7/10/23- Ct CAP- stable Stable exam without evidence for new disease.Stable pulmonary nodules including a dominant nodular opacity at the right lower lobe.    7/20/23- Bevacizumab for radiation necrosis    8/16/23- Bevacizumab     9/12/23- Ct CAP-  Stable right lower lobe dominant nodular opacity. No new disease in the chest, abdomen and pelvis.     9/15, 10/6, 10/27, 11/17-  Bevacizumab    10/25/23- MRI Brain- 1. Redemonstrated postoperative changes of right frontal craniotomy. Decreased size of the right frontal resection cavity with significant decrease in the surrounding right frontal lobe vasogenic edema seen on the previous study. Mass effect has essentially resolved in the interim and there is no longer any midline shift. In the interim, slightly increased thickness of a rind of peripheral nodular enhancement along the posterior inferior and medial aspects of the right frontal lobe resection cavity, indeterminate. There is no significant elevated cerebral blood volume in this area. The findings may represent evolving posttreatment changes; however, residual tumor is not excluded.  Stable postoperative changes status post left anterior parietal craniotomy with irregular enhancement in the left frontal lobe parenchyma underlying the resection cavity, likely due to posttreatment change. Other scattered supratentorial and infratentorial metastatic lesions are  overall similar to slightly decreased in size, as described.    12/5/23- PET/CT- with sole site of disease in the RLL measuring 1.6 x 1.4 cm and with SUV max of 4.2. No other sites of disease. His case was reviewed at tumor board and he met with Dr. Fu 12/14/24 with recommendations against surgical resection due to risk of significant pleural scarring. Continued on brigatinib 120 mg daily.     1/29/24 Brain MRI: No new metastatic lesions. No significant change in supratentorial and infratentorial subcentimeter metastatic enhancing lesions. Stable right inferior frontal and left high frontal postsurgical changes.     12/22-current: lost to follow up due to insurance issues    3/15/24- CT chest- Stable nodule medial right lower lobe. No metastatic disease demonstrated.    6/21/24- CT CAP- stable    6/26/24- Brain MRI- Numerous new and increased size of intracranial metastatic lesions. Increased nodular enhancement about the medial aspect of the right frontal lobe resection margin with increased adjacent T2/FLAIR hyperintense signal although without elevated cerebral blood volume could represent posttreatment changes although disease progression could have a similar appearance.  Stable left frontal lobe resection changes with stable underlying curvilinear enhancement.    7/10/24: start lorlatinib 100 mg daily    7/23/24-7/25/24: [facility] with syncopal episodes, suspected to be vasovagal due to low pressures      Interval Hx:  Seeing Connor in via video for follow up. He is feeling alright. Main thing he notices is feeling quite tired in the morning--taking him a while to get going. He has not had his Ritalin for two weeks.     Not really checking his blood sugars. Had a griffin thorough PCP but didn't like all the alarms so no longer wearing. 's systolic. Reports compliance with BP meds and insulin.     Has had blurry vision for the past few weeks. Denies any focal neuro sx like loss of vision, imbalance, weakness,  etc.     Otherwise, feels he is tolerating the lorlatinib well. Considering going to [place] this weekend, his dad [place] some [place] properties there.     ECOG PS 0-1    REVIEW OF SYSTEMS: 14 point ROS negative other than the symptoms noted above in the HPI.    Wt Readings from Last 4 Encounters:   08/30/24 98.4 kg (217 lb)   08/01/24 99.8 kg (220 lb)   07/31/24 99.8 kg (220 lb)   07/30/24 99.8 kg (220 lb)      Review of Systems:  A comprehensive ROS was performed and found to be negative or non-contributory with the exception of that noted in the HPI above.    Past Medical History:  GERD  Hypertension, not on medication  Type 2 diabetes mellitus, not on medications currently, previously on Metformin    Past Surgical History:  Past Surgical History:   Procedure Laterality Date    BRONCHOSCOPY RIGID OR FLEXIBLE W/TRANSENDOSCOPIC ENDOBRONCHIAL ULTRASOUND GUIDED Bilateral 1/26/2022    Procedure: Right BRONCHOSCOPY, FIBEROPTIC, endobronchial ultrasound, pleural biopsy;  Surgeon: Dallin Agrawal MD;  Location: [facility] OR    INJECT BLOCK MEDIAL BRANCH CERVICAL/THORACIC/LUMBAR      INSERT CHEST TUBE Right 2/16/2022    Procedure: INSERTION, CATHETER, INTERCOSTAL, FOR DRAINAGE;  Surgeon: Dallin Agrawal MD;  Location: [facility] GI    INSERT CHEST TUBE Right 3/9/2022    Procedure: INSERTION, CATHETER, INTERCOSTAL, FOR DRAINAGE;  Surgeon: Sushila Antonio MD;  Location: [facility] GI    IR CHEST TUBE REMOVAL TUNNELED RIGHT  4/2/2022    OPTICAL TRACKING SYSTEM CRANIOTOMY, EXCISE TUMOR, COMBINED Left 6/28/2022    Procedure: Left stealth craniotomy for tumor resection with motor mapping;  Surgeon: Stephen Moran MD;  Location:  OR    OPTICAL TRACKING SYSTEM CRANIOTOMY, EXCISE TUMOR, COMBINED Right 6/27/2023    Procedure: Right stealth craniotomy and resection of tumor;  Surgeon: Stephen Moran MD;  Location:  OR    ORTHOPEDIC SURGERY      Ganesh. Rotator cuff repair.    PLEUROSCOPY N/A 1/26/2022    Procedure: Pleuroscopy with Pleural  Biopsy;  Surgeon: Dallin Agrawal MD;  Location:  OR       Social History:  Lives with wife and 4 kids in [place]. Works as a  for an apartment complex in [place]. Exposure to household chemicals and . No significant exposure to asbestos. No signal exposure to benzene or similar chemicals. No significant smoking history-states that he smoked 1 to 2 cigarettes occasionally per month for about 2 years in college, non-smoking since then. No significant alcohol use history. No other recreational substances. Good support system. Kids are 23, 19, 17 and 13.    Family History  Significant history for cancers on maternal side. Mother  of uterine cancer. 2 maternal uncles have possible metastatic melanoma.    Outpatient Medications:  Current Outpatient Medications   Medication Sig Dispense Refill    acetaminophen (TYLENOL) 325 MG tablet Take 325-650 mg by mouth every 6 hours as needed for mild pain      albuterol (PROAIR HFA/PROVENTIL HFA/VENTOLIN HFA) 108 (90 Base) MCG/ACT inhaler Inhale 2 puffs into the lungs every 6 hours as needed for shortness of breath, wheezing or cough 18 g 0    Alcohol Swabs PADS Use to swab the area of the injection or jabier as directed. Per insurance coverage 100 each 0    amLODIPine (NORVASC) 5 MG tablet Take 1 tablet (5 mg) by mouth daily 60 tablet 1    blood glucose (NO BRAND SPECIFIED) lancets standard To use to test glucose level in the blood Use to test blood sugar  4  times daily as directed. To accompany glucose monitor brands per insurance coverage. 100 each 3    blood glucose (NO BRAND SPECIFIED) test strip To use to test glucose level in the blood Use to test blood sugar  4 times daily as directed. To accompany glucose monitor brands per insurance coverage. 100 strip 3    Blood Glucose Monitoring Suppl (ACCU-CHEK GUIDE) w/Device KIT       cyclobenzaprine (FLEXERIL) 5 MG tablet Take 2 tablets (10 mg) by mouth nightly as needed for muscle spasms 30  tablet 4    DULoxetine (CYMBALTA) 30 MG capsule Take 1 capsule (30 mg) by mouth 2 times daily 60 capsule 2    empagliflozin (JARDIANCE) 10 MG TABS tablet Take 1 tablet (10 mg) by mouth daily 30 tablet 0    empagliflozin (JARDIANCE) 25 MG TABS tablet Take 1 tablet (25 mg) by mouth daily 90 tablet 1    FLUoxetine (PROZAC) 20 MG capsule Take 1 capsule (20 mg) by mouth daily 30 capsule 1    insulin aspart (NOVOLOG PEN) 100 UNIT/ML pen Inject 0-40 Units Subcutaneous 3 times daily (before meals) Per discharge instructions sliding scale (Patient not taking: Reported on 8/5/2024) 45 mL 2    insulin glargine (LANTUS PEN) 100 UNIT/ML pen Inject 70 Units Subcutaneous every morning 15 mL 4    insulin pen needle (32G X 4 MM) 32G X 4 MM miscellaneous Use as directed by provider. Per insurance coverage 100 each 0    levETIRAcetam (KEPPRA) 1000 MG tablet Take 1 tablet (1,000 mg) by mouth 2 times daily 60 tablet 11    lisinopril (ZESTRIL) 40 MG tablet Take 1 tablet (40 mg) by mouth daily 30 tablet 1    lorlatinib (LORBRENA) 100 MG Take 1 tablet (100 mg) by mouth daily 30 tablet 0    metFORMIN (GLUCOPHAGE) 500 MG tablet Take 1 tablet (500 mg) by mouth 2 times daily (with meals) 60 tablet 3    methadone (DOLOPHINE) 5 MG tablet Take 1.5 tablets (7.5 mg) by mouth 2 times daily 90 tablet 0    methylphenidate (RITALIN) 5 MG tablet Take 1-2 tablets (5-10 mg) by mouth 2 times daily as needed (fatigue) Take second dose by 12 noon, if it is needed 90 tablet 0    naloxone (NARCAN) 4 MG/0.1ML nasal spray Spray 1 spray (4 mg) into one nostril alternating nostrils as needed for opioid reversal every 2-3 minutes until assistance arrives 0.2 mL 3    oxyCODONE IR (ROXICODONE) 10 MG tablet Take 1 tablet (10 mg) by mouth every 3 hours as needed for moderate pain 120 tablet 0    prochlorperazine (COMPAZINE) 10 MG tablet Take 1 tablet (10 mg) by mouth every 6 hours as needed for nausea or vomiting (Patient not taking: Reported on 8/5/2024) 30 tablet 2  "   propranolol (INDERAL) 20 MG tablet Take 1 tablet (20 mg) by mouth 2 times daily 60 tablet 1    rivaroxaban ANTICOAGULANT (XARELTO) 20 MG TABS tablet Take 1 tablet (20 mg) by mouth daily (with dinner) 90 tablet 3    rosuvastatin (CRESTOR) 5 MG tablet Take 1 tablet (5 mg) by mouth daily 90 tablet 0     No current facility-administered medications for this visit.     Ht 1.753 m (5' 9\")   Wt 98.4 kg (217 lb)   BMI 32.05 kg/m    General: No acute distress, no rashes on skin.   HEENT: Sclera anicteric. Oral mucosa pink and moist.  No mucositis or thrush  Heart: Regular, rate, and rhythm  Lungs: Clear to ascultation bilaterally. Breathing comfortably  Abdomen: Positive bowel sounds. Soft, non-distended, non-tender.   Extremities: no lower extremity edema  Neuro: Cranial nerves grossly intact      Labs & Studies: I personally reviewed the following studies:  Most Recent 3 CBC's:  Recent Labs   Lab Test 08/28/24  1157 07/31/24  1354 07/24/24  0658   WBC 9.2 10.8 11.2*   HGB 13.8 14.3 14.1   MCV 93 90 93   * 178 180     Most Recent 3 BMP's:  Recent Labs   Lab Test 08/28/24  1157 07/31/24  1354 07/25/24  1235 07/24/24  0748 07/24/24  0658    140  --   --  138   POTASSIUM 4.0 3.7  --   --  4.4   CHLORIDE 105 102  --   --  102   CO2 25 25  --   --  28   BUN 13.0 13.3  --   --  20.5*   CR 0.73 0.63*  --   --  0.76   ANIONGAP 12 13  --   --  8   TORY 8.9 9.1  --   --  9.0   * 453* 414*   < > 315*    < > = values in this interval not displayed.    Most Recent 2 LFT's:  Recent Labs   Lab Test 08/28/24  1157 07/31/24  1354   AST 19 17   ALT 26 20   ALKPHOS 118 106   BILITOTAL 0.2 0.2    Most Recent TSH and T4:  Recent Labs   Lab Test 09/12/22  1642   TSH 2.56        ASSESSMENT AND PLAN:  Stage IV NSCLC, Rt lung adenocarcinoma with metastasis to pleura, mediastinum , rt pleural effusion and brain diagnosed 1/2022 (AJCC 8th edition)  PD-L1 TPS 2-3% by [place]   NGS [facility] panel-EML4:ALK rearragement; " chr2:[id number], chr2:[id number]  NGS [facility]- GNAS R201H, KRAS K5E- No ALK    He began Alectinib 600 mg BID 3/2/22 and unfortunately developed grade 3 myalgias  requiring dose reduction to 300 mg BID.   In Dec 2022,we stopped drug 12/20/22 due to unremitting arthalgias, eventully had to starte PO steroids which led to improvement and resolved. Radiographicaly, he has had a near CR to Rx in the lung, has a residual rt LL lesion.     Began brigatinib 2/22/23 (delayed due to pt hesitancy). Developed severe cough on day 2 so brigatinib was held and cough resolved with in 24-48 hours. He restarted 60 mg daily and upped it to 90 mg.Restging CT showing stable disease in the lung, however, MRI with several contrast enhancement and increase in size of previously treated lesions. His dose was increased to 180 mg daily to address possible CNS disease progression and started on dexamethasone. 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 Restaging CT in 9/2023 showing overall disease stablity with no evidence of active cancer. 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  PET/CT after these three months showed oligoperisstent disease with a single focus of active malignancy in the RLL. Met with Thoracic surgery 12/2023 to discuss resection but they recommended against it due to risk for scarring after. 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 MOst recent CT showing stable disease but brain MRI showing several possible new lesions and enlargement of exissting lesion (see below).     Switched Rx to Lorlatinib due to good CNS penetration. Now after about 6 weeks on therapy, repeat MRI with overall positive response, but a few enlarging and edematous lesions.     Plan  Continue lorlatinib    Referral to rad onc to consider radiation to enlarging lesions  CT CAP q3 mo from last--set up for October    #syncopal episodes: reviewed hospital notes with extensive work up. suspected to be vasovagal due to low pressures. No further episodes.     # Brain mets:   # Radiation necrosis,   -Baseline Brain MRI with several brain mets, s/p GK to 11-12 brain mets. F/u Brain MRI in June 2022 was showing enlargement of the one of the lesions along with edema, therefore had to undergo craniotomy followed by resection, the final biopsy consistent with radiation necrosis.   -received two doses bevacizumab for radiation necrosis in Fall 2022, tolerated poorly with HTN urgency and PE.)   -MRI in 4/2023 now showing contrast enhancement of lesions and a dominate lesion in the R frontal region with edema, several other smaller lesion. Short term MRI showing increase in size of the rt frontal lesion, underwent resection, patho again showing radiation necrosis. Restarted bevacizumab, which resulted in improved radiation necrosis / vasogenic edema on imaging in 10/2023 but ultimately was stopped due to uncontrolled HTN, last dose being 11/2023  -MRI imaging 1/2024 with stable disease and no edema.  -Brain MRI 3/2024 cancelled due to lapse in medical insurance.  -MRI 6/2024 showing showing several possible new lesions and enlargement of exissting lesion, however I am unsure if this is due to better imaging (perfusion MRI this time). Reviewed with rad onc and elected for short term MRI scan following initiation of lorlatinib, hold off on GK.  -MRI 8/28/24 with overall positive treatment response from rx but Two metastases (right parietal, left cerebellar) appears slightly enlarged, and Increased vasogenic edema associated with right temporal and left cerebellar metastases. D/w Dr. Persaud and will refer to rad onc to consider radiation to these areas. Given he is asymptomatic and has grossly uncontrolled diabetes, I am deferring  dex at this time.     #HTN: continue lisinopril and propranolol, increase norvasc to 10 mg    #hypercholesteremia   Increase rosuvastatin from 5 to 10 mg; confirmed he is taking    #Right pleuritic/chest pain  Felt to be 2/2 prior pleurex drain.   -On methadone and oxycodone; followed by Palliative Care      #Elevated Lipase  -mild elevation. No concern on exam    #Diabetes, Type 2  Hyperglycemia on labs. Compliant with insulin but not very adherent to diet. Started on metformin recently, and insulin. 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 Stable as of late      50 minutes spent on the date of the encounter doing chart review, review of test results, interpretation of tests, patient visit, and documentation     The longitudinal plan of care for the diagnosis(es)/condition(s) as documented were addressed during this visit. Due to the added complexity in care, I will continue to support Connor in the subsequent management and with ongoing continuity of care.    Chelsea Villegas, CNP on 8/30/2024 at 4:40 PM        Addendum: 9/4/24. Dr. Arango reviewed brain MRI and feels ongoing surveillance for now is adequate. R parietal lesion overall stable compared to April 2023 MRI and while L cerebellar is more pronounced, it is stable in size and in prior treatment. Will repeat MRI in 3 mo.   LG

## 2024-08-30 NOTE — LETTER
8/30/2024      Connor Emerson  [address]  [place] [address]      Dear Colleague,    Thank you for referring your patient, Connor Emerson, to the [facility]. Please see a copy of my visit note below.    Virtual Visit Details    Type of service:  Video Visit   Video Start Time: 1:15 PM  Video End Time:1:33 PM    Originating Location (pt. Location): Home    Distant Location (provider location):  Off-site  Platform used for Video Visit: [facility] ONCOLOGY FOLLOW UP NOTE    PATIENT NAME: Connor Emerson  ENCOUNTER DATE: August 30, 2024      Care Team  Primary Oncologist: Bassam Persaud MD    REASON FOR CURRENT VISIT: F/u of lung cancer    HISTORY OF PRESENT ILLNESS:  Mr. Connor Emerson is a 44 year old  male who is a non-smoker with PMHx of T2DM, HTN with metastatic NSCLC comes for follow up     Oncologic Hx:    Diagnosis:   Stage IV NSCLC, Rt lung adenocarcinoma with metastasis to pleura, mediastinum , rt pleural effusion and brain diagnosed 1/2022 (AJCC 8th edition)  PD-L1 TPS 2-3% by [facility]   NGS [facility] panel-EML4:ALK rearragement  NGS [facility]- GNAS R201H, KRAS K5E- No ALK    Treatment:   7/10/24-current: Lolatinib 100 mg daily    2/23/2022- 6/2024: Brigatinib. Dose reduced to 60 mg due to cough after two days of 90 mg daily -->back on 90 mg---> increased to 180 mg  ---> settled on 120 mg    6/27/23- Right stealth craniotomy and resection of tumor:     7/20/23- 11/14/24: Bevacizumab for radiation necrosis. Discontinued due to severe HTN.    Past:  2/15/22- GK to 11 brain lesions  3/2/22- 12/2022 Alectinib 300 mg BID (Dose reduced to 450 mg BID from 600 mg BID due to grade 3 myalgias 3/21/22, again reduced to 300 mg BID 9/28/22)  6/28/22- Craniotomy, resection  9/28/22-10/26- Bevacizumab for radiation necrosis (stopped due to PE)      Intent of treatment: Palliative    Oncologic course:  1/19/22 to 1/22/22-Admitted to [facility] for 2 week progressive SOB secondary to  have large rt sided pleural effusion, needing thoracentesis x2 (1.7L and 2.0 L removed), cytology positive for malignancy, adenocarcinoma.   1/26/22- Rt pleural mass biopsy-Dr. Agrawal--POSITIVE FOR ADENOCARCINOMA CONSISTENT WITH LUNG PRIMARY, admixed with mesothelial hyperplasia and inflammatory infiltrate (+ TTF-1 and CK 7;  negative  p40, calretinin and WT-1. PAX8 immunostain focal +). 4th thoracentesis done simultaneously - 3L approx removed.   2/1/22- PET/CT-Right lower lobe central infiltrative FDG avid 8.2 x 9.6 cm mass representing a primary lung adenocarcinoma. Ipsilateral right perihilar, bilateral pretracheal, subcarinal and superior mediastinal michele metastases. Contralateral mildly FDG avid few lung nodules are suspicious for contralateral metastasis. At least 3 intracranial metastases in the right frontal lobe, left frontal lobe and left cerebellar hemisphere. Nonspecific mild diffuse bone marrow uptake. Further evaluation with a spine MRI could be considered to rule out early marrow infiltration. This could also be seen with red marrow conversion.  2/5/22-  Brain MRI- At least 9 intracranial metastases as detailed above. The dominant lesions involving the orbital right frontal lobe, the posterior left middle frontal gyrus, anterior right temporal lobe and in the left cerebellar hemisphere have surrounding moderate vasogenic type edema.  2/15/22- Saw Dr. Arango from Rad Onc- Rcd GK to 12 lesion in bran  2/16/22- Pleurex placement   3/2/22- Started Alectinib 600 mg BID  3/21/22- Dose reduced to 450 mg BID due to grade 3 myalgias and fatigue  4/2/22 to 4/5/22- Admitted at [facility] for- Severe sepsis due to MSSA infection of right PleurX catheter s/p removal- He presented with onset of pain at tube site starting 4/1; at arrival was tachycardic with leukocytosis (22.7) and elevated lactic acid (2.9).  CT chest showed fluid and stranding tracking outside the pleural space into chest wall along pleural  catheter.  IR was consulted and removed catheter 4/2 with report of pustular drainage and tip culture growing MSSA.  Thoracic Surg was consulted who felt no surgical indication necessary given minimal pleural fluid and lack of any signs of abscess.  Initially treated with broad spectrum coverage for sepsis, narrowed to Ancef once sensitivities returned with plan to transition to cefadroxil for an additional 10 days at discharge per ID. Held drug 4/2 to 4/11 5/2/22- CT CAP- Overall, positive response to therapy with decreased size of right lower lobe and right pleural-based masses, pulmonary metastases, hilar and mediastinal lymphadenopathy. However, a single right posterior pleural-based mass has slightly increased in size since 2/24/2022. No metastatic disease in the abdomen and pelvis. Right Pleurx catheter has been removed. Trace right pleural effusion and right basilar atelectasis.  5/2/22- Brain MRI- The previously demonstrated brain metastases are mildly diminished in size versus to 2/5/2022. The degree of edema is also diminished but not completely resolved. Probable trace amounts of intralesional bleeding demonstrated on the gradient sequence within the metastases. No definite new metastasis or progressive mass effect. No hydrocephalus or infarct.    6/15/22 to 6/17/22- Admitted at [facility]-with aphasia and word finding difficulty over last few weeks.  He presented to [facility] ED on 6/10 for evaluation of his symptoms. MRI brain showed multiple intracranial metastases, with interval enlargement of the dominant lesion within the left frontal lobe and increased surrounding vasogenic edema with 2 mm rightward shift of the septum pellucidum. Due to his worsening anxiety, he left AMA. His symptoms continued to progress to where he could not write at work so he decided to go to the ED for re-evaluation and treatment. Evaluated by NSGY, Rad Onc (radiaiton necrosis vs tumor progression).  6/16/22- MR Brain (6/16) shows  multiple intracranial metastases, with interval enlargement  of the dominant lesion within the left frontal lobe and increased surrounding vasogenic edema with 2 mm rightward shift of the septum pellucidum.  6/16/22- - CT CAP shows slightly decreased size of right lower lobe and right pleural-based masses. No new pulmonary nodules or lymphadenopathy; No evidence of metastatic disease in the abdomen or pelvis.   6/28/22 to 6/30/22- Admitted at [facility]- Elective left Stealth craniotomy with resection of brain tumor due to ongoing symptoms. No intraoperative complications. EBL 50 ml.  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  7/5/22- Ct CAP- Right lower lobe low-density nodules are not significantly changed. A small left upper lobe pulmonary nodule is also unchanged. Trace pleural fluid on the right has increased slightly. No convincing evidence for metastatic disease in the abdomen or pelvis.  7/18/22 to 7/19/22- Admitted to [facility] for seizure- Reportedly was only taking once Keppra instead of twice daily. Also resumed on dexamethasone 2 mg daily  8/1/22- Brain MRI- Redemonstrated postsurgical changes status post left frontoparietal Craniotomy. Interval increase in size of the dominant ring-enhancing lesion in the left posterior superior frontal lobe with increased moderate surrounding vasogenic edema and local mass effect resulting in narrowing of the supratentorial ventricular system. No significant midline shift/herniation at this time    8/1/22- Dex was increased to 4 mg daily by Dr. Moran    9/1/22- CT Chest- Near resolution of previously seen right pleural nodule. Stable right lower lobe pulmonary nodule    9/28/22- Bevacizumab for radiation necrosis    10/26/22- Bevacizumab     10/26/22- Ct CAP- Stable posterior medial right lower lobe 1.9 x 1.1 cm nodule series 8 image 176. Adjacent stable scarring and atelectasis. The previously noted pleural nodule posteriorly on the right is not currently clearly  identified. Stable left upper lobe 0.3 cm nodule image 56    10/26/22- Brain MRI- Overall improved appearance of multiple intracranial metastases with near resolution of associated edema and diminished enhancement and size of multiple residual lesions. No definite new or progressive metastasis.    11/7/22 to 11/9/22- Admitted for PE and HTN urgency- Small pulmonary embolism in the right lower lobe pulmonary artery. started on Lovenox, Brain MRI neg for PRES.    12/29/22- ED visit- bilateral hip pain, pain in shoulders, knuckles, knees, and ankles- holding alectinib since 12/20/22 1/6/23- Ct CAP- Mild groundglass nodularity in the left upper lobe is new since the previous exam, and may be infectious in etiology. No other significant interval change. Pulmonary nodules are not significantly changed.    1/6/23- Brain MRI- Stable to diminished sequelae of intracranial metastasis and treatment changes. No new or progressive metastasis. No superimposed acute intracranial finding.     2/23/2022- Start Brigatinib. Dose reduced to 60 mg due to cough after two days of 90 mg daily -  3/23/23-Brigatinib  (increase to 90 mg)    4/20/23- CT CAP- Stable- Previously noted mild groundglass nodularity in the left upper lobe has resolved.Pulmonary nodules are unchanged.Trace amount pleural fluid on the right has decreased slightly.    4/20/23- Brain MRI- Possile progression- Largest metastasis within the right frontal lobe has increased in size with worsening peripheral nodular enhancement and worsening vasogenic edema contributing to new right to left midline shift.  Multiple new/enlarging metastases scattered throughout the cerebral hemispheres and cerebellum. Started on dexamethasone by NGS on 4/28/23 5/17/23- presents to clinic with glucose 627, admission to hospital for stability on insulin drip    6/16/23- Brain MRI- Interval increase in size of the necrotic lesion in the anterior aspect of the right frontal lobe from 2.4 x  2.3 x 2.4 cm previously to 3.0 x 3.5 x 2.8 cm on the current study. Mass effect due to slightly increased vasogenic edema surrounding the right frontal lesion resulted in increase of leftward midline shift of the anterior interhemispheric fissure from 0.7 cm previously to 0.9 cm currently. Interval increase in size in two adjacent metastases at the frontoparietal junction on the left. The remaining scattered intra-axial enhancing nodules are not changed appreciably in size or appearance since the comparison study.No evidence for acute intracranial pathology.   Dr. Moran- Due to worsening headaches and more problems with walking. Recommended a right Stealth craniotomy for resection of the lesion.     6/27/23- Right stealth craniotomy and resection of tumor: Final path: radiation necrosis    7/10/23- Ct CAP- stable Stable exam without evidence for new disease.Stable pulmonary nodules including a dominant nodular opacity at the right lower lobe.    7/20/23- Bevacizumab for radiation necrosis    8/16/23- Bevacizumab     9/12/23- Ct CAP-  Stable right lower lobe dominant nodular opacity. No new disease in the chest, abdomen and pelvis.     9/15, 10/6, 10/27, 11/17-  Bevacizumab    10/25/23- MRI Brain- 1. Redemonstrated postoperative changes of right frontal craniotomy. Decreased size of the right frontal resection cavity with significant decrease in the surrounding right frontal lobe vasogenic edema seen on the previous study. Mass effect has essentially resolved in the interim and there is no longer any midline shift. In the interim, slightly increased thickness of a rind of peripheral nodular enhancement along the posterior inferior and medial aspects of the right frontal lobe resection cavity, indeterminate. There is no significant elevated cerebral blood volume in this area. The findings may represent evolving posttreatment changes; however, residual tumor is not excluded.  Stable postoperative changes status post  left anterior parietal craniotomy with irregular enhancement in the left frontal lobe parenchyma underlying the resection cavity, likely due to posttreatment change. Other scattered supratentorial and infratentorial metastatic lesions are overall similar to slightly decreased in size, as described.    12/5/23- PET/CT- with sole site of disease in the RLL measuring 1.6 x 1.4 cm and with SUV max of 4.2. No other sites of disease. His case was reviewed at tumor board and he met with Dr. Fu 12/14/24 with recommendations against surgical resection due to risk of significant pleural scarring. Continued on brigatinib 120 mg daily.     1/29/24 Brain MRI: No new metastatic lesions. No significant change in supratentorial and infratentorial subcentimeter metastatic enhancing lesions. Stable right inferior frontal and left high frontal postsurgical changes.     12/22-current: lost to follow up due to insurance issues    3/15/24- CT chest- Stable nodule medial right lower lobe. No metastatic disease demonstrated.    6/21/24- CT CAP- stable    6/26/24- Brain MRI- Numerous new and increased size of intracranial metastatic lesions. Increased nodular enhancement about the medial aspect of the right frontal lobe resection margin with increased adjacent T2/FLAIR hyperintense signal although without elevated cerebral blood volume could represent posttreatment changes although disease progression could have a similar appearance.  Stable left frontal lobe resection changes with stable underlying curvilinear enhancement.    7/10/24: start lorlatinib 100 mg daily    7/23/24-7/25/24: [facility] with syncopal episodes, suspected to be vasovagal due to low pressures      Interval Hx:  Seeing Connor in via video for follow up. He is feeling alright. Main thing he notices is feeling quite tired in the morning--taking him a while to get going. He has not had his Ritalin for two weeks.     Not really checking his blood sugars. Had a griffin thorough  PCP but didn't like all the alarms so no longer wearing. 's systolic. Reports compliance with BP meds and insulin.     Has had blurry vision for the past few weeks. Denies any focal neuro sx like loss of vision, imbalance, weakness, etc.     Otherwise, feels he is tolerating the lorlatinib well. Considering going to [place] this weekend, his dad has some [place] properties there.     ECOG PS 0-1    REVIEW OF SYSTEMS: 14 point ROS negative other than the symptoms noted above in the HPI.    Wt Readings from Last 4 Encounters:   08/30/24 98.4 kg (217 lb)   08/01/24 99.8 kg (220 lb)   07/31/24 99.8 kg (220 lb)   07/30/24 99.8 kg (220 lb)      Review of Systems:  A comprehensive ROS was performed and found to be negative or non-contributory with the exception of that noted in the HPI above.    Past Medical History:  GERD  Hypertension, not on medication  Type 2 diabetes mellitus, not on medications currently, previously on Metformin    Past Surgical History:  Past Surgical History:   Procedure Laterality Date     BRONCHOSCOPY RIGID OR FLEXIBLE W/TRANSENDOSCOPIC ENDOBRONCHIAL ULTRASOUND GUIDED Bilateral 1/26/2022    Procedure: Right BRONCHOSCOPY, FIBEROPTIC, endobronchial ultrasound, pleural biopsy;  Surgeon: Dallin Agrawal MD;  Location: [facility] OR     INJECT BLOCK MEDIAL BRANCH CERVICAL/THORACIC/LUMBAR       INSERT CHEST TUBE Right 2/16/2022    Procedure: INSERTION, CATHETER, INTERCOSTAL, FOR DRAINAGE;  Surgeon: Dallin Agrawal MD;  Location: [facility] GI     INSERT CHEST TUBE Right 3/9/2022    Procedure: INSERTION, CATHETER, INTERCOSTAL, FOR DRAINAGE;  Surgeon: Sushila Antonio MD;  Location: [facility] GI     IR CHEST TUBE REMOVAL TUNNELED RIGHT  4/2/2022     OPTICAL TRACKING SYSTEM CRANIOTOMY, EXCISE TUMOR, COMBINED Left 6/28/2022    Procedure: Left stealth craniotomy for tumor resection with motor mapping;  Surgeon: Stephen Moran MD;  Location:  OR     OPTICAL TRACKING SYSTEM CRANIOTOMY, EXCISE TUMOR, COMBINED Right  2023    Procedure: Right stealth craniotomy and resection of tumor;  Surgeon: Stephen Moran MD;  Location:  OR     ORTHOPEDIC SURGERY      Ganesh. Rotator cuff repair.     PLEUROSCOPY N/A 2022    Procedure: Pleuroscopy with Pleural Biopsy;  Surgeon: Dallin Agrawal MD;  Location:  OR       Social History:  Lives with wife and 4 kids in [place]. Works as a  for an apartment complex in [place]. Exposure to household chemicals and . No significant exposure to asbestos. No signal exposure to benzene or similar chemicals. No significant smoking history-states that he smoked 1 to 2 cigarettes occasionally per month for about 2 years in college, non-smoking since then. No significant alcohol use history. No other recreational substances. Good support system. Kids are 23, 19, 17 and 13.    Family History  Significant history for cancers on maternal side. Mother  of uterine cancer. 2 maternal uncles have possible metastatic melanoma.    Outpatient Medications:  Current Outpatient Medications   Medication Sig Dispense Refill     acetaminophen (TYLENOL) 325 MG tablet Take 325-650 mg by mouth every 6 hours as needed for mild pain       albuterol (PROAIR HFA/PROVENTIL HFA/VENTOLIN HFA) 108 (90 Base) MCG/ACT inhaler Inhale 2 puffs into the lungs every 6 hours as needed for shortness of breath, wheezing or cough 18 g 0     Alcohol Swabs PADS Use to swab the area of the injection or jabier as directed. Per insurance coverage 100 each 0     amLODIPine (NORVASC) 5 MG tablet Take 1 tablet (5 mg) by mouth daily 60 tablet 1     blood glucose (NO BRAND SPECIFIED) lancets standard To use to test glucose level in the blood Use to test blood sugar  4  times daily as directed. To accompany glucose monitor brands per insurance coverage. 100 each 3     blood glucose (NO BRAND SPECIFIED) test strip To use to test glucose level in the blood Use to test blood sugar  4 times daily as  directed. To accompany glucose monitor brands per insurance coverage. 100 strip 3     Blood Glucose Monitoring Suppl (ACCU-CHEK GUIDE) w/Device KIT        cyclobenzaprine (FLEXERIL) 5 MG tablet Take 2 tablets (10 mg) by mouth nightly as needed for muscle spasms 30 tablet 4     DULoxetine (CYMBALTA) 30 MG capsule Take 1 capsule (30 mg) by mouth 2 times daily 60 capsule 2     empagliflozin (JARDIANCE) 10 MG TABS tablet Take 1 tablet (10 mg) by mouth daily 30 tablet 0     empagliflozin (JARDIANCE) 25 MG TABS tablet Take 1 tablet (25 mg) by mouth daily 90 tablet 1     FLUoxetine (PROZAC) 20 MG capsule Take 1 capsule (20 mg) by mouth daily 30 capsule 1     insulin aspart (NOVOLOG PEN) 100 UNIT/ML pen Inject 0-40 Units Subcutaneous 3 times daily (before meals) Per discharge instructions sliding scale (Patient not taking: Reported on 8/5/2024) 45 mL 2     insulin glargine (LANTUS PEN) 100 UNIT/ML pen Inject 70 Units Subcutaneous every morning 15 mL 4     insulin pen needle (32G X 4 MM) 32G X 4 MM miscellaneous Use as directed by provider. Per insurance coverage 100 each 0     levETIRAcetam (KEPPRA) 1000 MG tablet Take 1 tablet (1,000 mg) by mouth 2 times daily 60 tablet 11     lisinopril (ZESTRIL) 40 MG tablet Take 1 tablet (40 mg) by mouth daily 30 tablet 1     lorlatinib (LORBRENA) 100 MG Take 1 tablet (100 mg) by mouth daily 30 tablet 0     metFORMIN (GLUCOPHAGE) 500 MG tablet Take 1 tablet (500 mg) by mouth 2 times daily (with meals) 60 tablet 3     methadone (DOLOPHINE) 5 MG tablet Take 1.5 tablets (7.5 mg) by mouth 2 times daily 90 tablet 0     methylphenidate (RITALIN) 5 MG tablet Take 1-2 tablets (5-10 mg) by mouth 2 times daily as needed (fatigue) Take second dose by 12 noon, if it is needed 90 tablet 0     naloxone (NARCAN) 4 MG/0.1ML nasal spray Spray 1 spray (4 mg) into one nostril alternating nostrils as needed for opioid reversal every 2-3 minutes until assistance arrives 0.2 mL 3     oxyCODONE IR  "(ROXICODONE) 10 MG tablet Take 1 tablet (10 mg) by mouth every 3 hours as needed for moderate pain 120 tablet 0     prochlorperazine (COMPAZINE) 10 MG tablet Take 1 tablet (10 mg) by mouth every 6 hours as needed for nausea or vomiting (Patient not taking: Reported on 8/5/2024) 30 tablet 2     propranolol (INDERAL) 20 MG tablet Take 1 tablet (20 mg) by mouth 2 times daily 60 tablet 1     rivaroxaban ANTICOAGULANT (XARELTO) 20 MG TABS tablet Take 1 tablet (20 mg) by mouth daily (with dinner) 90 tablet 3     rosuvastatin (CRESTOR) 5 MG tablet Take 1 tablet (5 mg) by mouth daily 90 tablet 0     No current facility-administered medications for this visit.     Ht 1.753 m (5' 9\")   Wt 98.4 kg (217 lb)   BMI 32.05 kg/m    General: No acute distress, no rashes on skin.   HEENT: Sclera anicteric. Oral mucosa pink and moist.  No mucositis or thrush  Heart: Regular, rate, and rhythm  Lungs: Clear to ascultation bilaterally. Breathing comfortably  Abdomen: Positive bowel sounds. Soft, non-distended, non-tender.   Extremities: no lower extremity edema  Neuro: Cranial nerves grossly intact      Labs & Studies: I personally reviewed the following studies:  Most Recent 3 CBC's:  Recent Labs   Lab Test 08/28/24  1157 07/31/24  1354 07/24/24  0658   WBC 9.2 10.8 11.2*   HGB 13.8 14.3 14.1   MCV 93 90 93   * 178 180     Most Recent 3 BMP's:  Recent Labs   Lab Test 08/28/24  1157 07/31/24  1354 07/25/24  1235 07/24/24  0748 07/24/24  0658    140  --   --  138   POTASSIUM 4.0 3.7  --   --  4.4   CHLORIDE 105 102  --   --  102   CO2 25 25  --   --  28   BUN 13.0 13.3  --   --  20.5*   CR 0.73 0.63*  --   --  0.76   ANIONGAP 12 13  --   --  8   TORY 8.9 9.1  --   --  9.0   * 453* 414*   < > 315*    < > = values in this interval not displayed.    Most Recent 2 LFT's:  Recent Labs   Lab Test 08/28/24  1157 07/31/24  1354   AST 19 17   ALT 26 20   ALKPHOS 118 106   BILITOTAL 0.2 0.2    Most Recent TSH and T4:  Recent " Labs   Lab Test 09/12/22  1642   TSH 2.56        ASSESSMENT AND PLAN:  Stage IV NSCLC, Rt lung adenocarcinoma with metastasis to pleura, mediastinum , rt pleural effusion and brain diagnosed 1/2022 (AJCC 8th edition)  PD-L1 TPS 2-3% by [place]   NGS [facility] panel-EML4:ALK rearragement; chr2:[id number], chr2:[id number]  NGS [facility]- GNAS R201H, KRAS K5E- No ALK    He began Alectinib 600 mg BID 3/2/22 and unfortunately developed grade 3 myalgias  requiring dose reduction to 300 mg BID.   In Dec 2022,we stopped drug 12/20/22 due to unremitting arthalgias, eventully had to starte PO steroids which led to improvement and resolved. Radiographicaly, he has had a near CR to Rx in the lung, has a residual rt LL lesion.     Began brigatinib 2/22/23 (delayed due to pt hesitancy). Developed severe cough on day 2 so brigatinib was held and cough resolved with in 24-48 hours. He restarted 60 mg daily and upped it to 90 mg.Restging CT showing stable disease in the lung, however, MRI with several contrast enhancement and increase in size of previously treated lesions. His dose was increased to 180 mg daily to address possible CNS disease progression and started on dexamethasone. 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 Restaging CT in 9/2023 showing overall disease stablity with no evidence of active cancer. 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  PET/CT after these three months showed oligoperisstent disease with a single focus of active malignancy in the RLL. Met with Thoracic surgery 12/2023 to discuss resection but they recommended against it due to risk for scarring after. 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 MOst recent CT showing stable disease but brain MRI showing several  possible new lesions and enlargement of exissting lesion (see below).     Switched Rx to Lorlatinib due to good CNS penetration. Now after about 6 weeks on therapy, repeat MRI with overall positive response, but a few enlarging and edematous lesions.     Plan  Continue lorlatinib   Referral to rad onc to consider radiation to enlarging lesions  CT CAP q3 mo from last--set up for October    #syncopal episodes: reviewed hospital notes with extensive work up. suspected to be vasovagal due to low pressures. No further episodes.     # Brain mets:   # Radiation necrosis,   -Baseline Brain MRI with several brain mets, s/p GK to 11-12 brain mets. F/u Brain MRI in June 2022 was showing enlargement of the one of the lesions along with edema, therefore had to undergo craniotomy followed by resection, the final biopsy consistent with radiation necrosis.   -received two doses bevacizumab for radiation necrosis in Fall 2022, tolerated poorly with HTN urgency and PE.)   -MRI in 4/2023 now showing contrast enhancement of lesions and a dominate lesion in the R frontal region with edema, several other smaller lesion. Short term MRI showing increase in size of the rt frontal lesion, underwent resection, patho again showing radiation necrosis. Restarted bevacizumab, which resulted in improved radiation necrosis / vasogenic edema on imaging in 10/2023 but ultimately was stopped due to uncontrolled HTN, last dose being 11/2023  -MRI imaging 1/2024 with stable disease and no edema.  -Brain MRI 3/2024 cancelled due to lapse in medical insurance.  -MRI 6/2024 showing showing several possible new lesions and enlargement of exissting lesion, however I am unsure if this is due to better imaging (perfusion MRI this time). Reviewed with rad onc and elected for short term MRI scan following initiation of lorlatinib, hold off on GK.  -MRI 8/28/24 with overall positive treatment response from rx but Two metastases (right parietal, left cerebellar)  appears slightly enlarged, and Increased vasogenic edema associated with right temporal and left cerebellar metastases. D/w Dr. Persaud and will refer to rad onc to consider radiation to these areas. Given he is asymptomatic and has grossly uncontrolled diabetes, I am deferring dex at this time.     #HTN: continue lisinopril and propranolol, increase norvasc to 10 mg    #hypercholesteremia   Increase rosuvastatin from 5 to 10 mg; confirmed he is taking    #Right pleuritic/chest pain  Felt to be 2/2 prior pleurex drain.   -On methadone and oxycodone; followed by Palliative Care      #Elevated Lipase  -mild elevation. No concern on exam    #Diabetes, Type 2  Hyperglycemia on labs. Compliant with insulin but not very adherent to diet. Started on metformin recently, and insulin. 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 Stable as of late      50 minutes spent on the date of the encounter doing chart review, review of test results, interpretation of tests, patient visit, and documentation     The longitudinal plan of care for the diagnosis(es)/condition(s) as documented were addressed during this visit. Due to the added complexity in care, I will continue to support Connor in the subsequent management and with ongoing continuity of care.    Chelsea Villegas CNP on 8/30/2024 at 4:40 PM            Again, thank you for allowing me to participate in the care of your patient.        Sincerely,        Chelsea Villegas CNP

## 2024-09-03 ENCOUNTER — MYC REFILL (OUTPATIENT)
Dept: PALLIATIVE CARE | Facility: CLINIC | Age: 44
End: 2024-09-03
Payer: COMMERCIAL

## 2024-09-03 DIAGNOSIS — D49.6 BRAIN TUMOR (H): ICD-10-CM

## 2024-09-03 DIAGNOSIS — Z98.890 S/P CRANIOTOMY: ICD-10-CM

## 2024-09-03 RX ORDER — OXYCODONE HYDROCHLORIDE 10 MG/1
10 TABLET ORAL
Qty: 120 TABLET | Refills: 0 | Status: SHIPPED | OUTPATIENT
Start: 2024-09-05 | End: 2024-09-16

## 2024-09-03 NOTE — TELEPHONE ENCOUNTER
Received [employer] message from patient requesting refill of oxycodone.     Last refill: 8/22/24  Last office visit: 8/1/24  Scheduled for follow up 10/21/24     Will route request to MD/ for review.     Reviewed [place]  Report.

## 2024-09-04 ENCOUNTER — VIRTUAL VISIT (OUTPATIENT)
Dept: PHARMACY | Facility: CLINIC | Age: 44
End: 2024-09-04
Payer: COMMERCIAL

## 2024-09-04 ENCOUNTER — PATIENT OUTREACH (OUTPATIENT)
Dept: ONCOLOGY | Facility: CLINIC | Age: 44
End: 2024-09-04
Payer: COMMERCIAL

## 2024-09-04 DIAGNOSIS — I10 HTN, GOAL BELOW 140/90: ICD-10-CM

## 2024-09-04 DIAGNOSIS — E78.5 HYPERLIPIDEMIA LDL GOAL <100: ICD-10-CM

## 2024-09-04 DIAGNOSIS — M54.50 LOW BACK PAIN: ICD-10-CM

## 2024-09-04 DIAGNOSIS — Z79.4 TYPE 2 DIABETES MELLITUS WITHOUT COMPLICATION, WITH LONG-TERM CURRENT USE OF INSULIN (H): Primary | ICD-10-CM

## 2024-09-04 DIAGNOSIS — M79.10 MYALGIA: ICD-10-CM

## 2024-09-04 DIAGNOSIS — F41.9 ANXIETY: ICD-10-CM

## 2024-09-04 DIAGNOSIS — E11.9 TYPE 2 DIABETES MELLITUS WITHOUT COMPLICATION, WITH LONG-TERM CURRENT USE OF INSULIN (H): Primary | ICD-10-CM

## 2024-09-04 PROCEDURE — 99607 MTMS BY PHARM ADDL 15 MIN: CPT | Mod: 93 | Performed by: PHARMACIST

## 2024-09-04 PROCEDURE — 99606 MTMS BY PHARM EST 15 MIN: CPT | Mod: 93 | Performed by: PHARMACIST

## 2024-09-04 RX ORDER — KETOROLAC TROMETHAMINE 30 MG/ML
1 INJECTION, SOLUTION INTRAMUSCULAR; INTRAVENOUS ONCE
Qty: 1 EACH | Refills: 0 | Status: SHIPPED | OUTPATIENT
Start: 2024-09-04 | End: 2024-09-04

## 2024-09-04 RX ORDER — BLOOD-GLUCOSE SENSOR
1 EACH MISCELLANEOUS
Qty: 6 EACH | Refills: 3 | Status: SHIPPED | OUTPATIENT
Start: 2024-09-04

## 2024-09-04 NOTE — PROGRESS NOTES
Medication Therapy Management (MTM) Encounter    ASSESSMENT:                            Medication Adherence/Access: No issues identified    Diabetes   Due to A1c being elevated, could consider addition of Glp-1 for blood glucose and weight benefit. Will discuss with primary care provider and oncology providers and consider at next visit. Renal function is stable since starting Jardiance at primary care provider visit end of July. Asked him to start the Jardiance 25 mg that was prescribed by his primary care provider. Ordered the freestyle mohan 3 continuous glucose monitor today.    Hypertension:   Seems that high blood pressure could be related to anxiety. Asked him to get some home readings for next visit.    Mental Health   Anxiety  Patient could consider buspirone for help managing anxiety. Usually do not recommend taking both fluoxetine and duloxetine due to duplicate therapy. I will discuss with primary care provider and discuss at next visit.    Hyperlipidemia   Rosuvastatin recently increased. Will need to recheck lipids in November.     Fatigue:  Stable.     Pain:   Stable.     PLAN:                            Download the mohan 3 asa. Here is how you connect to the [facility] so I can see your data.  Using the [employer] Asa    Go to:  Menu > Connected Apps > Vertra > Connect to a Practice > Enter Practice ID: [id number]  >Click Next and then Connect after viewing your doctor s practice contact information    I sent new prescriptions to the pharmacy for the Mohan 3 sensors and reader. Start using this as soon as possible.  I asked the pharmacy to fill Jardiance 25 mg. Please have your wife start using these to fill your pill box instead of the 10 mg tablets.  See if your Dr. Brewer wants to recheck vitamin D to see if that is low since you have had a low reading in the past.  Also discuss other options for fatigue since the methylphenidate is not working and options for pain as that is not  controlled as well.    Follow-up: Return in 2 weeks (on 9/18/2024).    SUBJECTIVE/OBJECTIVE:                          Connor Emerson is a 44 year old male seen for a follow-up visit from 8/5/24.       Reason for visit: follow-up on diabetes and anxiety.    Allergies/ADRs: Reviewed in chart  Past Medical History: Reviewed in chart  Tobacco: He reports that he has never smoked. He has never been exposed to tobacco smoke. He has never used smokeless tobacco.  Alcohol: rarely will have a drink    Medication Adherence/Access: no issues reported. His wife uses a pillbox and set them up. She makes sure takes his bills twice daily.    Diabetes   Jardiance 10 mg daily - he has not filled the 25 mg tablets yet.   Metformin 500 mg twice a day - no diarrhea. Had diarrhea in the past before at higher doses.   Lantus 71 units daily  Not taking short acting insulin.     Blood sugar monitoring: rarely. Would like to get the Blood cell Storage continuous glucose monitor.    Reports having no symptoms of hypoglycemia but he does know what they feel like (lightheaded, shaky)    Diet/Exercise: Everything he eats is sugar free.  Eating one meal a day which is usually dinner. He may have a snack for lunch (carrots). Reports no sweets. Rarely will have a regular pop. Doesn't feel that a diabetes education referral is needed right now.       Eye exam is up to date  Foot exam: due    Hypertension   Lisinopril 40 mg daily  Propranolol 20 mg twice a day   Amlodipine 10 mg once daily - increased by oncology on 8/30/24    Patient reports no current medication side effects.          Mental Health   Anxiety  Duloxetine 30mg twice daily  Fluoxetine 20mg once daily.     Reports anxiety is much better than a month ago. He is feeling pretty good in regards to his anxiety. He does not have a therapist but would be interested.    Patient reports no current medication side effects.           Hyperlipidemia   rosuvastatin 10 mg daily - increased by oncology on  8/30/24    Patient reports no significant myalgias or other side effects.  The 10-year ASCVD risk score (Oseas OLSEN, et al., 2019) is: 11.6%    Values used to calculate the score:      Age: 44 years      Sex: Male      Is Non- : No      Diabetic: Yes      Tobacco smoker: No      Systolic Blood Pressure: 159 mmHg      Is BP treated: Yes      HDL Cholesterol: 34 mg/dL      Total Cholesterol: 201 mg/dL            Fatigue:  Methylphenidate 5-10 mg twice a day as needed     Reports it is not working. He just feels heavy and everything hurts when he moves.    Pain:   Acetaminophen 1000 mg as needed - using once daily currently  Methadone 7.5 mg twice a day   Oxycodone 10 mg every 3 hours    Reports that he is still having a all body achy pain and having to use the oxycodone more.     Today's Vitals: There were no vitals taken for this visit.  ----------------      I spent 39 minutes with this patient today. All changes were made via collaborative practice agreement with Jessica Mohr MD. A copy of the visit note was provided to the patient's provider(s).    A summary of these recommendations was sent via [employer] and mailed.    Prabha Ling, RenéeD  Medication Therapy Management Pharmacist    Telemedicine Visit Details  Type of service:  Telephone visit  Start Time: 2:41 PM  End Time: 3:20 PM     Medication Therapy Recommendations  Type 2 diabetes mellitus without complication (H)    Current Medication: empagliflozin (JARDIANCE) 25 MG TABS tablet   Rationale: Does not understand instructions - Adherence - Adherence   Recommendation: Provide Education   Status: Patient Agreed - Adherence/Education

## 2024-09-04 NOTE — PATIENT INSTRUCTIONS
"Recommendations from today's MTM visit:                                                       Download the UEIS 3 asa. Here is how you connect to the [facility] so I can see your data.  Using the [employer] Asa    Go to:  Menu > Connected Apps > [employer] > Connect to a Practice > Enter Practice ID: [id number]  >Click Next and then Connect after viewing your doctor s practice contact information    I sent new prescriptions to the pharmacy for the Mohan 3 sensors and reader. Start using this as soon as possible.  I asked the pharmacy to fill Jardiance 25 mg. Please have your wife start using these to fill your pill box instead of the 10 mg tablets.  See if your Dr. Brewer wants to recheck vitamin D to see if that is low since you have had a low reading in the past.  Also discuss other options for fatigue since the methylphenidate is not working and options for pain as that is not controlled as well.    Follow-up: No follow-ups on file.    It was great speaking with you today.  I value your experience and would be very thankful for your time in providing feedback in our clinic survey. In the next few days, you may receive an email or text message from [employer] with a link to a survey related to your  clinical pharmacist.\"     To schedule another MTM appointment, please call the clinic directly or you may call the MTM scheduling line at [phone] or toll-free at [phone].     My Clinical Pharmacist's contact information:                                                      Please feel free to contact me with any questions or concerns you have.      Prabha Ling, PharmD  Medication Therapy Management Pharmacist  [facility] - Monday and Wednesday 7:30 - 4:00    "

## 2024-09-04 NOTE — PROGRESS NOTES
"Called Pt to relay, Per Dr. Arango, that \" I was asked to look at this patient's most recent brain MRI scan in relation to his previous GammaKnife treatments. The lesion in the right parietal area has not changed when you compare it to the scan done in April 2023 and can continue to be observed. The lesion in the left cerebellum has not grown and the entirety of the lesion is within the previous treatment. However, it does appear more pronounced and warrants close observation. If it continues to increase in size, then it likely represents tumor growth. Please obtain another scan in 3 months.\"  Pt verbalized understanding.    "

## 2024-09-04 NOTE — Clinical Note
Hey I noticed he is taking both duloxetine and fluoxetine. His anxiety is much improved. Thoughts on slowly decreasing the fluoxetine and seeing if his anxiety can be managed with just duloxetine? Usually do not recommend taking both fluoxetine and duloxetine due to duplicate therapy. I am following up in 2 weeks. Let me know your thoughts.  Thanks!  Prabha Ling

## 2024-09-07 ENCOUNTER — MYC MEDICAL ADVICE (OUTPATIENT)
Dept: ONCOLOGY | Facility: CLINIC | Age: 44
End: 2024-09-07
Payer: COMMERCIAL
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
## 2024-09-09 DIAGNOSIS — I67.89 RADIATION THERAPY INDUCED BRAIN NECROSIS: ICD-10-CM

## 2024-09-09 DIAGNOSIS — C34.31 MALIGNANT NEOPLASM OF LOWER LOBE OF RIGHT LUNG (H): Primary | ICD-10-CM

## 2024-09-09 NOTE — PROGRESS NOTES
I heard back from Dr. Fu and Dr. Brewer and both would be fine with Connor starting a GLP-1 for his diabetes.    Prabha Ling, PharmD  Medication Therapy Management Pharmacist

## 2024-09-09 NOTE — TELEPHONE ENCOUNTER
Call to pt to inform him of provider response. Pt denying neurological concerns. Pt stating he will try salt/soda rinse q 4 hrs. Advised pt to call back if sx do not improve/progress. Pt verbalized understanding.

## 2024-09-09 NOTE — TELEPHONE ENCOUNTER
Oncology Nurse Triage - Pain    Situation: Connor reporting the following symptoms: Pain    Background:   Treating Provider:       Date of last office visit: 08/30/24 w/Chelsea Villegas    Recent Treatments: Lorlatinib    Assessment:     Location: Tongue   Onset: 1 week  Duration/Frequency:Only occurs when he sleeps, comes and goes at night. Lasts around 1 min at a time.  Rates: 6-7/10, wakes him up from sleep  Quality: Feels like mouth is swollen and edges of tongue are like needles  Current med(s) used: Not taking anything for tongue pain  Worsens or relieves with: Rubbing against roof of mouth or teeth makes it feel better.     Pt reports he had a low grade fever on Friday, temp 99.9 F.     Denies fevers/chills, chest pain, SOB, n/v, bowel & bladder issues, mouth sores, white patches in mouth    Pt wondering if pain could be related to his tumors.    Recommendations:   Informed pt writer would reach out to provider and call back with any recommendations.

## 2024-09-10 ENCOUNTER — TELEPHONE (OUTPATIENT)
Dept: ONCOLOGY | Facility: CLINIC | Age: 44
End: 2024-09-10
Payer: COMMERCIAL

## 2024-09-10 NOTE — TELEPHONE ENCOUNTER
Oral Chemotherapy Monitoring Program    Subjective/Objective:  Connor Emerson is a 44 year old male contacted by phone for a follow-up visit for oral chemotherapy.  Connor confirmed taking Lorbrena 1 tablet daily. He denies any new medications.   Connor denies any nausea, diarrhea, vomiting. He struggles with fatigue.         8/2/2024     1:00 PM 8/5/2024     3:00 PM 8/9/2024     8:00 AM 8/20/2024    10:00 AM 8/27/2024     3:00 PM 9/9/2024     8:00 AM 9/10/2024     3:00 PM   ORAL CHEMOTHERAPY   Assessment Type Other Left Voicemail Refill Left Voicemail Other Refill Monthly Follow up   Diagnosis Code Non-Small Cell Lung Cancer Non-Small Cell Lung Cancer Non-Small Cell Lung Cancer Non-Small Cell Lung Cancer Non-Small Cell Lung Cancer Non-Small Cell Lung Cancer Non-Small Cell Lung Cancer   Providers Dr Cori Persaud   [facility]   Is this patient followed by the [facility] OC team? No No No No No No No   Drug Name Lorbrena (lorlatinib) Lorbrena (lorlatinib) Lorbrena (lorlatinib) Lorbrena (lorlatinib) Lorbrena (lorlatinib) Lorbrena (lorlatinib) Lorbrena (lorlatinib)   Dose 100 mg 100 mg 100 mg 100 mg 100 mg 100 mg 100 mg   Current Schedule Daily Daily Daily Daily Daily Daily Daily   Cycle Details Continuous Continuous Continuous Continuous Continuous Continuous Continuous   Adverse Effects Hypertension    Hypertension  Fatigue   Fatigue       Grade 1   Pharmacist Intervention(fatigue)       No   Hypertension Grade 3    Grade 2     Pharmacist intervention(hypertension) Yes    Yes     Intervention(s) Referral to oncology provider    Referral to oncology provider         Last PHQ-2 Score on record:       6/19/2024     8:00 AM 12/22/2023     1:46 PM   PHQ-2 ( 1999 Pf[employer])   Q1: Little interest or pleasure in doing things 3 0   Q2: Feeling down, depressed or hopeless 3 0   PHQ-2 Score 6 0   Q1: Little  "interest or pleasure in doing things Nearly every day    Q2: Feeling down, depressed or hopeless Nearly every day    PHQ-2 Score 6        Vitals:  BP:   BP Readings from Last 1 Encounters:   08/01/24 (!) 159/103     Wt Readings from Last 1 Encounters:   08/30/24 98.4 kg (217 lb)     Estimated body surface area is 2.19 meters squared as calculated from the following:    Height as of 8/30/24: 1.753 m (5' 9\").    Weight as of 8/30/24: 98.4 kg (217 lb).    Labs:  _  Result Component Current Result Ref Range   Sodium 142 (8/28/2024) 135 - 145 mmol/L     _  Result Component Current Result Ref Range   Potassium 4.0 (8/28/2024) 3.4 - 5.3 mmol/L     _  Result Component Current Result Ref Range   Calcium 8.9 (8/28/2024) 8.8 - 10.4 mg/dL     _  Result Component Current Result Ref Range   Magnesium 1.8 (8/28/2024) 1.7 - 2.3 mg/dL     _  Result Component Current Result Ref Range   Phosphorus 3.1 (8/28/2024) 2.5 - 4.5 mg/dL     _  Result Component Current Result Ref Range   Albumin 4.1 (8/28/2024) 3.5 - 5.2 g/dL     _  Result Component Current Result Ref Range   Urea Nitrogen 13.0 (8/28/2024) 6.0 - 20.0 mg/dL     _  Result Component Current Result Ref Range   Creatinine 0.73 (8/28/2024) 0.67 - 1.17 mg/dL     _  Result Component Current Result Ref Range   AST 19 (8/28/2024) 0 - 45 U/L     _  Result Component Current Result Ref Range   ALT 26 (8/28/2024) 0 - 70 U/L     _  Result Component Current Result Ref Range   Bilirubin Total 0.2 (8/28/2024) <=1.2 mg/dL     _  Result Component Current Result Ref Range   WBC Count 9.2 (8/28/2024) 4.0 - 11.0 10e3/uL     _  Result Component Current Result Ref Range   Hemoglobin 13.8 (8/28/2024) 13.3 - 17.7 g/dL     _  Result Component Current Result Ref Range   Platelet Count 139 (L) (8/28/2024) 150 - 450 10e3/uL     No results found for ANC within last 30 days.     _  Result Component Current Result Ref Range   Absolute Neutrophils 5.8 (8/28/2024) 1.6 - 8.3 10e3/uL    "       Assessment/Plan:  Connor is doing okay overall. Continue monitoring.     Follow-Up:  Appointment with Dr. Persaud 10/7      Micaela Wharton  Pharmacy Intern  Oral Chemotherapy Monitoring Program  [facility]  [phone]

## 2024-09-12 ENCOUNTER — MYC MEDICAL ADVICE (OUTPATIENT)
Dept: ONCOLOGY | Facility: CLINIC | Age: 44
End: 2024-09-12
Payer: COMMERCIAL

## 2024-09-12 NOTE — CONFIDENTIAL NOTE
Oncology Nurse Triage - Reporting Symptoms    Situation:   Connor reporting the following symptoms:pain in area directly behind elbows on back       Background:   Treating Provider:   Dr Persaud     Date of last office visit: 8/30/24 Chelsea Villegas     Recent treatments: Yes: lorlatinib       Assessment  Onset of symptoms: had back pain yesterday, pain woke him up at 1am.   No fevers   Has not noticed any more frequency in cough.   Not coughing anything up.   Took oxycodone for pain that woke him up in middle of night drank water and was able to fall back to sleep after a little tossing and turning.    Rates pain in back right now an 8/10 states not to severe but is achey. Took oxycodone at 9am.     Recommendations:   Advised to continue oxycodone for pain and will send message to care team to see what they advise.

## 2024-09-13 NOTE — CONFIDENTIAL NOTE
Per Chelsea Villegas:   Very well could be his chronic lung/pleuritic pain. I would recommend following its trend and treating with pain medication appropriately. I do not think we need to arrange imaging right now.     My chart message sent to Connor with above recommendations.

## 2024-09-16 ENCOUNTER — MYC REFILL (OUTPATIENT)
Dept: PALLIATIVE CARE | Facility: CLINIC | Age: 44
End: 2024-09-16
Payer: COMMERCIAL

## 2024-09-16 ENCOUNTER — MYC REFILL (OUTPATIENT)
Dept: ONCOLOGY | Facility: CLINIC | Age: 44
End: 2024-09-16
Payer: COMMERCIAL

## 2024-09-16 ENCOUNTER — MYC REFILL (OUTPATIENT)
Dept: FAMILY MEDICINE | Facility: CLINIC | Age: 44
End: 2024-09-16
Payer: COMMERCIAL

## 2024-09-16 DIAGNOSIS — E11.65 TYPE 2 DIABETES MELLITUS WITH HYPERGLYCEMIA, WITHOUT LONG-TERM CURRENT USE OF INSULIN (H): ICD-10-CM

## 2024-09-16 DIAGNOSIS — F41.9 ANXIETY: ICD-10-CM

## 2024-09-16 DIAGNOSIS — C34.31 MALIGNANT NEOPLASM OF LOWER LOBE OF RIGHT LUNG (H): ICD-10-CM

## 2024-09-16 DIAGNOSIS — D49.6 BRAIN TUMOR (H): ICD-10-CM

## 2024-09-16 DIAGNOSIS — Z98.890 S/P CRANIOTOMY: ICD-10-CM

## 2024-09-16 RX ORDER — OXYCODONE HYDROCHLORIDE 10 MG/1
10 TABLET ORAL
Qty: 120 TABLET | Refills: 0 | Status: SHIPPED | OUTPATIENT
Start: 2024-09-20 | End: 2024-09-19

## 2024-09-16 NOTE — TELEPHONE ENCOUNTER
Received [employer] message from patient requesting refill of oxycodone.     Last refill: 9/5/24  Last office visit: 8/1/24  Scheduled for follow up 10/21/24     Will route request to MD/ for review.     Reviewed [place]  Report.

## 2024-09-17 ENCOUNTER — MYC MEDICAL ADVICE (OUTPATIENT)
Dept: ONCOLOGY | Facility: CLINIC | Age: 44
End: 2024-09-17
Payer: COMMERCIAL

## 2024-09-17 RX ORDER — DULOXETIN HYDROCHLORIDE 30 MG/1
30 CAPSULE, DELAYED RELEASE ORAL 2 TIMES DAILY
Qty: 60 CAPSULE | Refills: 2 | Status: SHIPPED | OUTPATIENT
Start: 2024-09-17

## 2024-09-17 NOTE — TELEPHONE ENCOUNTER
Pending Prescriptions:                       Disp   Refills    DULoxetine (CYMBALTA) 30 MG capsule       60 cap*2            Sig: Take 1 capsule (30 mg) by mouth 2 times daily.    Last prescribing provider:     Last clinic visit date: 08/01/24 w/    Recommendations for requested medication (if none, N/A): copied from last OV note: Continue Duloxetine.     Any other pertinent information (if none, N/A): N/A    Refilled: Y/N, if NO, why?

## 2024-09-17 NOTE — TELEPHONE ENCOUNTER
Oncology Nurse Triage - Pain  Situation: Connor reporting the following symptoms: Pain    Background:   Treating Provider: Bassam Persaud MD    DX: NSCLC    Date of last office visit: 8/30/24 with Chelsea Villegas CNP    Recent Treatments:loratinib  Referral to Rad Onc  CT CAP in 3 months--scheduled for October  Per Chelsea Villegas's note on 8/30:  Two metastases (right parietal, left cerebellar) appears slightly enlarged, and Increased vasogenic edema associated with right temporal and left cerebellar metastases. D/w Dr. Persaud and will refer to rad onc to consider radiation to these areas.     Assessment:     Location: L sided headache, front/top  Onset last two days  Duration/Frequency: for two days it has been constant.   Rates: Severe and migraine-like Last night was 10/10; right now, at work 1-2/10  Quality: dull pain that is constant and gets a little bit worse at times.   Current med(s) used: Took some tylenol yesterday; doesn't have oxycodone.   Worsens or relieves with: ice pack really helped it.   Drinking 2 gallons of water per day.  Does not drink any caffienated beverages.   Some abdominal pain x 2 days that comes and goes below umbilicus.   Denies fevers/chills, cough, sore throat, chest pain, SOB, n/v, bowel & bladder issues, rash, new or increased bleeding or worsening fatigue.      Recommendations:   Try tylenol 1000 mg TID and see if this helps.  Continue to push fluids.  Continue to use ice.  Monitor abdominal pain. Call back if worsens.  Call back if HA continues to worsen, you have visual changes.  Pt voiced understanding.    Message routed to Chelsea Villegas CNP and Celso Ya, RNCC

## 2024-09-19 DIAGNOSIS — Z98.890 S/P CRANIOTOMY: ICD-10-CM

## 2024-09-19 DIAGNOSIS — D49.6 BRAIN TUMOR (H): ICD-10-CM

## 2024-09-19 RX ORDER — OXYCODONE HYDROCHLORIDE 10 MG/1
10 TABLET ORAL
Qty: 120 TABLET | Refills: 0 | Status: SHIPPED | OUTPATIENT
Start: 2024-09-19 | End: 2024-10-02

## 2024-09-19 NOTE — TELEPHONE ENCOUNTER
Resending oxycodone prescription to allow for refill today.    VINH StaplesN, RN  Palliative Care Nurse Clinician    [phone] (Direct)  [phone] (Main)  [phone] (Appointment Scheduling)

## 2024-09-20 ENCOUNTER — MYC REFILL (OUTPATIENT)
Dept: PHARMACY | Facility: CLINIC | Age: 44
End: 2024-09-20
Payer: COMMERCIAL

## 2024-09-20 DIAGNOSIS — E11.9 TYPE 2 DIABETES MELLITUS WITHOUT COMPLICATION, WITH LONG-TERM CURRENT USE OF INSULIN (H): ICD-10-CM

## 2024-09-20 DIAGNOSIS — Z79.4 TYPE 2 DIABETES MELLITUS WITHOUT COMPLICATION, WITH LONG-TERM CURRENT USE OF INSULIN (H): ICD-10-CM

## 2024-09-23 RX ORDER — BLOOD-GLUCOSE SENSOR
1 EACH MISCELLANEOUS
Qty: 6 EACH | Refills: 3 | OUTPATIENT
Start: 2024-09-23

## 2024-09-23 NOTE — PROGRESS NOTES
I heard back from Dr. Persaud and he has no concern about starting Ozempic from the cancer treatment perspective.    Prabha Ling, PharmD  Medication Therapy Management Pharmacist

## 2024-10-01 ENCOUNTER — MYC REFILL (OUTPATIENT)
Dept: RADIATION ONCOLOGY | Facility: CLINIC | Age: 44
End: 2024-10-01
Payer: COMMERCIAL

## 2024-10-01 DIAGNOSIS — C79.31 MALIGNANT NEOPLASM METASTATIC TO BRAIN (H): ICD-10-CM

## 2024-10-01 DIAGNOSIS — Z98.890 S/P CRANIOTOMY: ICD-10-CM

## 2024-10-01 DIAGNOSIS — C34.90 NON-SMALL CELL LUNG CANCER, UNSPECIFIED LATERALITY (H): ICD-10-CM

## 2024-10-01 DIAGNOSIS — D49.6 BRAIN TUMOR (H): ICD-10-CM

## 2024-10-01 DIAGNOSIS — G89.3 CANCER ASSOCIATED PAIN: ICD-10-CM

## 2024-10-02 DIAGNOSIS — C79.31 MALIGNANT NEOPLASM METASTATIC TO BRAIN (H): ICD-10-CM

## 2024-10-02 DIAGNOSIS — D49.6 BRAIN TUMOR (H): ICD-10-CM

## 2024-10-02 DIAGNOSIS — C34.90 NON-SMALL CELL LUNG CANCER, UNSPECIFIED LATERALITY (H): ICD-10-CM

## 2024-10-02 DIAGNOSIS — G89.3 CANCER ASSOCIATED PAIN: ICD-10-CM

## 2024-10-02 DIAGNOSIS — Z98.890 S/P CRANIOTOMY: ICD-10-CM

## 2024-10-02 RX ORDER — METHADONE HYDROCHLORIDE 5 MG/1
7.5 TABLET ORAL 2 TIMES DAILY
Qty: 90 TABLET | Refills: 0 | OUTPATIENT
Start: 2024-10-02

## 2024-10-02 RX ORDER — OXYCODONE HYDROCHLORIDE 10 MG/1
10 TABLET ORAL
Qty: 120 TABLET | Refills: 0 | Status: SHIPPED | OUTPATIENT
Start: 2024-10-02 | End: 2024-10-04

## 2024-10-02 RX ORDER — METHADONE HYDROCHLORIDE 5 MG/1
5 TABLET ORAL 2 TIMES DAILY
Qty: 60 TABLET | Refills: 0 | Status: SHIPPED | OUTPATIENT
Start: 2024-10-02

## 2024-10-02 RX ORDER — OXYCODONE HYDROCHLORIDE 10 MG/1
10 TABLET ORAL
Qty: 120 TABLET | Refills: 0 | OUTPATIENT
Start: 2024-10-03

## 2024-10-02 NOTE — TELEPHONE ENCOUNTER
Pt requested a refill of methadone. Last fill was in June. Called pt to confirm dosing schedule. He is taking 5 mg twice daily. Will update medlist.    VINH StaplesN, RN  Palliative Care Nurse Clinician    [phone] (Direct)  [phone] (Main)  [phone] (Appointment Scheduling)

## 2024-10-02 NOTE — TELEPHONE ENCOUNTER
Received [employer] message from patient requesting refill of  methadone and oxycodone .     Last refill of methadone: 6/11/24  Last refill of oxycodone: 9/19/24  Last office visit: 8/1/24  Scheduled for follow up 10/21/24     Will route request to NP for review.     Reviewed [place]  Report.

## 2024-10-04 ENCOUNTER — TELEPHONE (OUTPATIENT)
Dept: PALLIATIVE MEDICINE | Facility: CLINIC | Age: 44
End: 2024-10-04
Payer: COMMERCIAL

## 2024-10-04 ENCOUNTER — TELEPHONE (OUTPATIENT)
Dept: PALLIATIVE MEDICINE | Facility: CLINIC | Age: 44
End: 2024-10-04

## 2024-10-04 ENCOUNTER — TELEPHONE (OUTPATIENT)
Dept: PALLIATIVE CARE | Facility: CLINIC | Age: 44
End: 2024-10-04

## 2024-10-04 ENCOUNTER — LAB (OUTPATIENT)
Dept: LAB | Facility: CLINIC | Age: 44
End: 2024-10-04
Payer: COMMERCIAL

## 2024-10-04 DIAGNOSIS — C34.90 PRIMARY MALIGNANT NEOPLASM OF LUNG METASTATIC TO OTHER SITE, UNSPECIFIED LATERALITY (H): ICD-10-CM

## 2024-10-04 DIAGNOSIS — Z98.890 S/P CRANIOTOMY: ICD-10-CM

## 2024-10-04 DIAGNOSIS — E11.65 TYPE 2 DIABETES MELLITUS WITH HYPERGLYCEMIA, WITHOUT LONG-TERM CURRENT USE OF INSULIN (H): ICD-10-CM

## 2024-10-04 DIAGNOSIS — Z79.899 ENCOUNTER FOR LONG-TERM (CURRENT) USE OF MEDICATIONS: ICD-10-CM

## 2024-10-04 DIAGNOSIS — D49.6 BRAIN TUMOR (H): ICD-10-CM

## 2024-10-04 LAB
ALBUMIN SERPL BCG-MCNC: 4.5 G/DL (ref 3.5–5.2)
ALP SERPL-CCNC: 105 U/L (ref 40–150)
ALT SERPL W P-5'-P-CCNC: 29 U/L (ref 0–70)
ANION GAP SERPL CALCULATED.3IONS-SCNC: 13 MMOL/L (ref 7–15)
AST SERPL W P-5'-P-CCNC: 20 U/L (ref 0–45)
BASOPHILS # BLD AUTO: 0.1 10E3/UL (ref 0–0.2)
BASOPHILS NFR BLD AUTO: 1 %
BILIRUB SERPL-MCNC: 0.3 MG/DL
BUN SERPL-MCNC: 13.2 MG/DL (ref 6–20)
CALCIUM SERPL-MCNC: 9.5 MG/DL (ref 8.8–10.4)
CHLORIDE SERPL-SCNC: 99 MMOL/L (ref 98–107)
CREAT SERPL-MCNC: 0.75 MG/DL (ref 0.67–1.17)
EGFRCR SERPLBLD CKD-EPI 2021: >90 ML/MIN/1.73M2
EOSINOPHIL # BLD AUTO: 0.5 10E3/UL (ref 0–0.7)
EOSINOPHIL NFR BLD AUTO: 5 %
ERYTHROCYTE [DISTWIDTH] IN BLOOD BY AUTOMATED COUNT: 12.9 % (ref 10–15)
EST. AVERAGE GLUCOSE BLD GHB EST-MCNC: 283 MG/DL
GLUCOSE SERPL-MCNC: 419 MG/DL (ref 70–99)
HBA1C MFR BLD: 11.5 %
HCO3 SERPL-SCNC: 24 MMOL/L (ref 22–29)
HCT VFR BLD AUTO: 42.8 % (ref 40–53)
HGB BLD-MCNC: 14.5 G/DL (ref 13.3–17.7)
IMM GRANULOCYTES # BLD: 0 10E3/UL
IMM GRANULOCYTES NFR BLD: 0 %
LYMPHOCYTES # BLD AUTO: 2.7 10E3/UL (ref 0.8–5.3)
LYMPHOCYTES NFR BLD AUTO: 28 %
MAGNESIUM SERPL-MCNC: 2.1 MG/DL (ref 1.7–2.3)
MCH RBC QN AUTO: 31.1 PG (ref 26.5–33)
MCHC RBC AUTO-ENTMCNC: 33.9 G/DL (ref 31.5–36.5)
MCV RBC AUTO: 92 FL (ref 78–100)
MONOCYTES # BLD AUTO: 0.8 10E3/UL (ref 0–1.3)
MONOCYTES NFR BLD AUTO: 9 %
NEUTROPHILS # BLD AUTO: 5.5 10E3/UL (ref 1.6–8.3)
NEUTROPHILS NFR BLD AUTO: 57 %
NRBC # BLD AUTO: 0 10E3/UL
NRBC BLD AUTO-RTO: 0 /100
PHOSPHATE SERPL-MCNC: 3.2 MG/DL (ref 2.5–4.5)
PLATELET # BLD AUTO: 193 10E3/UL (ref 150–450)
POTASSIUM SERPL-SCNC: 3.8 MMOL/L (ref 3.4–5.3)
PROT SERPL-MCNC: 6.8 G/DL (ref 6.4–8.3)
RBC # BLD AUTO: 4.66 10E6/UL (ref 4.4–5.9)
SODIUM SERPL-SCNC: 136 MMOL/L (ref 135–145)
WBC # BLD AUTO: 9.6 10E3/UL (ref 4–11)

## 2024-10-04 PROCEDURE — 83735 ASSAY OF MAGNESIUM: CPT

## 2024-10-04 PROCEDURE — 85025 COMPLETE CBC W/AUTO DIFF WBC: CPT

## 2024-10-04 PROCEDURE — 84155 ASSAY OF PROTEIN SERUM: CPT

## 2024-10-04 PROCEDURE — 36415 COLL VENOUS BLD VENIPUNCTURE: CPT

## 2024-10-04 PROCEDURE — 83036 HEMOGLOBIN GLYCOSYLATED A1C: CPT

## 2024-10-04 PROCEDURE — 84100 ASSAY OF PHOSPHORUS: CPT

## 2024-10-04 RX ORDER — OXYCODONE HYDROCHLORIDE 10 MG/1
10 TABLET ORAL
Qty: 120 TABLET | Refills: 0 | Status: SHIPPED | OUTPATIENT
Start: 2024-10-04 | End: 2024-10-16

## 2024-10-04 NOTE — TELEPHONE ENCOUNTER
[facility] Call Center    Phone Message    May a detailed message be left on voicemail: no     Reason for Call: Brian with [facility] ([phone]) calling in URGENT request for a possible prior authorization needed for oxyCODONE IR (ROXICODONE) 10 MG tablet . Pharmacy asking, as override needed. Methadone in question. Pharmacy hasn't filled since June, yet insurance isn't allowing oxy to go through. Pharmacy requesting what meds patient should be taking currently in order to process URGENT request for patient (prior to weekend, please). Can call back and speak with Harvey Torres or Mago. Thanks!    Action Taken: Other:  Palliative    Travel Screening: Not Applicable     Date of Service:

## 2024-10-04 NOTE — TELEPHONE ENCOUNTER
Resending refill of oxycodone to a different [facility] since his local pharmacy is not open on Saturday and insurance will not allow the refill today.    VINH StaplesN, RN  Palliative Care Nurse Clinician    [phone] (Direct)  [phone] (Main)  [phone] (Appointment Scheduling)

## 2024-10-04 NOTE — TELEPHONE ENCOUNTER
Retail Pharmacy Prior Authorization Team   Phone: [phone]    PA Initiation-Per pharmacy, insurance states this needs a new PA due to MEQ with patient also taking methadone.     Medication: OXYCODONE HCL 10 MG PO TABS  Insurance Company: [employer] - Phone [phone] Fax [phone]  Pharmacy Filling the Rx: [facility] - [place], [place] - [address]  Filling Pharmacy Phone: [phone]  Filling Pharmacy Fax: [phone]  Start Date: 10/4/2024

## 2024-10-04 NOTE — TELEPHONE ENCOUNTER
Retail Pharmacy Prior Authorization Team   Phone: [phone]    PA Initiation-Per pharmacy, insurance states this needs a new PA due to MEQ with patient also taking oxycodone.     Medication: METHADONE HCL 5 MG PO TABS  Insurance Company: [employer] [facility] - Phone [phone] Fax [phone]  Pharmacy Filling the Rx: [place], [place] - [address]  Filling Pharmacy Phone: [phone]  Filling Pharmacy Fax: [phone]  Start Date: 10/4/2024

## 2024-10-04 NOTE — TELEPHONE ENCOUNTER
Refills processed for both methadone and oxycodone earlier this week. Methadone went through insurance however oxycodone was not going through. Zahida has been in communication multiple times with both insurance and the pharmacist. See her note from 10/3.    Pt has been out of oxycodone since Wed, however should have had enough medication to get him through today. Pt's insurance does not allow early fills and does not allow pt to pay out of pocket for opiates.    I asked pt's wife how pt is taking oxycodone since he is out early. She said   Pt had a conversation with a pharmacist to review meds. The pharmacist mentioned sending a message to Dr. Brewer suggesting an increase in oxycodone. Pt took that to mean he should increase it and he's been taking an extra tablet daily when he gets home from work. Reinforced multiple times that pt may not take any extra doses or adjust the dosing schedule without talking to our team first.    Wife will ensure pt is taking methadone on a scheduled basis. He has not been, previous fill of 90 tabs was last filled in June. He has been taking 5 mg BID since  Tuesday. I reviewed this with Connor Ahumada. Pt may increase methadone back to 7.5 mg Q 12 hr.     Oxycodone order has been sent to [facility] since they are open on Saturday. Zahida has resubmitted urgent prior authorizations for both methadone and oxycodone to pt's insurance company.    VINH StaplesN, RN  Palliative Care Nurse Clinician    [phone] (Direct)  [phone] (Main)  [phone] (Appointment Scheduling)

## 2024-10-06 NOTE — TELEPHONE ENCOUNTER
Prior Authorization Approval    Medication: METHADONE HCL 5 MG PO TABS  Authorization Effective Date: 7/7/2024  Authorization Expiration Date: 10/5/2025  Approved Dose/Quantity:   Reference #:     Insurance Company: [employer] - Phone [phone] Fax [phone]  Expected CoPay: $    CoPay Card Available:      Financial Assistance Needed:   Which Pharmacy is filling the prescription: [facility] [place]  [place], [place] - [address] [address] [facility] [address]  Pharmacy Notified: Pharmacy filled and has a paid claim  Patient Notified:

## 2024-10-06 NOTE — TELEPHONE ENCOUNTER
Prior Authorization Approval    Medication: OXYCODONE HCL 10 MG PO TABS  Authorization Effective Date: 7/7/2024  Authorization Expiration Date: 10/5/2025  Approved Dose/Quantity:   Reference #:     Insurance Company: [employer] - Phone [phone] Fax [phone]  Expected CoPay: $    CoPay Card Available:      Financial Assistance Needed:   Which Pharmacy is filling the prescription: [facility] - [place], [place] - [address]  Pharmacy Notified: Yes. Pharmacy filled and has a paid claim  Patient Notified:

## 2024-10-07 ENCOUNTER — PATIENT OUTREACH (OUTPATIENT)
Dept: CARE COORDINATION | Facility: CLINIC | Age: 44
End: 2024-10-07
Payer: COMMERCIAL

## 2024-10-07 ENCOUNTER — VIRTUAL VISIT (OUTPATIENT)
Dept: ONCOLOGY | Facility: CLINIC | Age: 44
End: 2024-10-07
Attending: STUDENT IN AN ORGANIZED HEALTH CARE EDUCATION/TRAINING PROGRAM
Payer: COMMERCIAL

## 2024-10-07 VITALS — WEIGHT: 215 LBS | HEIGHT: 69 IN | BODY MASS INDEX: 31.84 KG/M2

## 2024-10-07 DIAGNOSIS — C34.31 MALIGNANT NEOPLASM OF LOWER LOBE OF RIGHT LUNG (H): Primary | ICD-10-CM
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
## 2024-10-07 DIAGNOSIS — I67.89 RADIATION THERAPY INDUCED BRAIN NECROSIS: ICD-10-CM

## 2024-10-07 PROCEDURE — 99215 OFFICE O/P EST HI 40 MIN: CPT | Mod: 95 | Performed by: STUDENT IN AN ORGANIZED HEALTH CARE EDUCATION/TRAINING PROGRAM

## 2024-10-07 ASSESSMENT — PAIN SCALES - GENERAL: PAINLEVEL: NO PAIN (0)

## 2024-10-07 NOTE — PROGRESS NOTES
Virtual Visit Details    Type of service:  Video Visit   Video Start Time: 1:39 PM  Video End Time:1:39 PM    Originating Location (pt. Location): Home    Distant Location (provider location):  On-site  Platform used for Video Visit: [facility] ONCOLOGY FOLLOW UP NOTE    PATIENT NAME: Connor Emerson  ENCOUNTER DATE: Oct 7, 2024      Care Team  Primary Oncologist: Bassam Persaud MD    REASON FOR CURRENT VISIT: F/u of lung cancer    HISTORY OF PRESENT ILLNESS:  Mr. Connor Emerson is a 44 year old  male who is a non-smoker with PMHx of T2DM, HTN with metastatic NSCLC comes for follow up     Oncologic Hx:    Diagnosis:   Stage IV NSCLC, Rt lung adenocarcinoma with metastasis to pleura, mediastinum , rt pleural effusion and brain diagnosed 1/2022 (AJCC 8th edition)  PD-L1 TPS 2-3% by [place]   NGS [facility] panel-EML4:ALK rearragement  NGS Guardant- GNAS R201H, KRAS K5E- No ALK    Treatment:   7/10/24-current: Lolatinib 100 mg daily    2/23/2022- 6/2024: Brigatinib. Dose reduced to 60 mg due to cough after two days of 90 mg daily -->back on 90 mg---> increased to 180 mg  ---> settled on 120 mg    6/27/23- Right stealth craniotomy and resection of tumor:     7/20/23- 11/14/24: Bevacizumab for radiation necrosis. Discontinued due to severe HTN.    Past:  2/15/22- GK to 11 brain lesions  3/2/22- 12/2022 Alectinib 300 mg BID (Dose reduced to 450 mg BID from 600 mg BID due to grade 3 myalgias 3/21/22, again reduced to 300 mg BID 9/28/22)  6/28/22- Craniotomy, resection  9/28/22-10/26- Bevacizumab for radiation necrosis (stopped due to PE)      Intent of treatment: Palliative    Oncologic course:  1/19/22 to 1/22/22-Admitted to [facility] for 2 week progressive SOB secondary to have large rt sided pleural effusion, needing thoracentesis x2 (1.7L and 2.0 L removed), cytology positive for malignancy, adenocarcinoma.   1/26/22- Rt pleural mass biopsy-Dr. Agrawal--POSITIVE FOR ADENOCARCINOMA CONSISTENT WITH LUNG PRIMARY, admixed with  mesothelial hyperplasia and inflammatory infiltrate (+ TTF-1 and CK 7;  negative  p40, calretinin and WT-1. PAX8 immunostain focal +). 4th thoracentesis done simultaneously - 3L approx removed.   2/1/22- PET/CT-Right lower lobe central infiltrative FDG avid 8.2 x 9.6 cm mass representing a primary lung adenocarcinoma. Ipsilateral right perihilar, bilateral pretracheal, subcarinal and superior mediastinal michele metastases. Contralateral mildly FDG avid few lung nodules are suspicious for contralateral metastasis. At least 3 intracranial metastases in the right frontal lobe, left frontal lobe and left cerebellar hemisphere. Nonspecific mild diffuse bone marrow uptake. Further evaluation with a spine MRI could be considered to rule out early marrow infiltration. This could also be seen with red marrow conversion.  2/5/22-  Brain MRI- At least 9 intracranial metastases as detailed above. The dominant lesions involving the orbital right frontal lobe, the posterior left middle frontal gyrus, anterior right temporal lobe and in the left cerebellar hemisphere have surrounding moderate vasogenic type edema.  2/15/22- Saw Dr. Arango from Rad Onc- Rcd GK to 12 lesion in bran  2/16/22- Pleurex placement   3/2/22- Started Alectinib 600 mg BID  3/21/22- Dose reduced to 450 mg BID due to grade 3 myalgias and fatigue  4/2/22 to 4/5/22- Admitted at [facility] for- Severe sepsis due to MSSA infection of right PleurX catheter s/p removal- He presented with onset of pain at tube site starting 4/1; at arrival was tachycardic with leukocytosis (22.7) and elevated lactic acid (2.9).  CT chest showed fluid and stranding tracking outside the pleural space into chest wall along pleural catheter.  IR was consulted and removed catheter 4/2 with report of pustular drainage and tip culture growing MSSA.  Thoracic Surg was consulted who felt no surgical indication necessary given minimal pleural fluid and lack of any signs of abscess.  Initially  treated with broad spectrum coverage for sepsis, narrowed to Ancef once sensitivities returned with plan to transition to cefadroxil for an additional 10 days at discharge per ID. Held drug 4/2 to 4/11 5/2/22- CT CAP- Overall, positive response to therapy with decreased size of right lower lobe and right pleural-based masses, pulmonary metastases, hilar and mediastinal lymphadenopathy. However, a single right posterior pleural-based mass has slightly increased in size since 2/24/2022. No metastatic disease in the abdomen and pelvis. Right Pleurx catheter has been removed. Trace right pleural effusion and right basilar atelectasis.  5/2/22- Brain MRI- The previously demonstrated brain metastases are mildly diminished in size versus to 2/5/2022. The degree of edema is also diminished but not completely resolved. Probable trace amounts of intralesional bleeding demonstrated on the gradient sequence within the metastases. No definite new metastasis or progressive mass effect. No hydrocephalus or infarct.    6/15/22 to 6/17/22- Admitted at [facility]-with aphasia and word finding difficulty over last few weeks.  He presented to [facility] ED on 6/10 for evaluation of his symptoms. MRI brain showed multiple intracranial metastases, with interval enlargement of the dominant lesion within the left frontal lobe and increased surrounding vasogenic edema with 2 mm rightward shift of the septum pellucidum. Due to his worsening anxiety, he left AMA. His symptoms continued to progress to where he could not write at work so he decided to go to the ED for re-evaluation and treatment. Evaluated by NSGY, Rad Onc (radiaiton necrosis vs tumor progression).  6/16/22- MR Brain (6/16) shows multiple intracranial metastases, with interval enlargement  of the dominant lesion within the left frontal lobe and increased surrounding vasogenic edema with 2 mm rightward shift of the septum pellucidum.  6/16/22- - CT CAP shows slightly decreased size of  right lower lobe and right pleural-based masses. No new pulmonary nodules or lymphadenopathy; No evidence of metastatic disease in the abdomen or pelvis.   6/28/22 to 6/30/22- Admitted at [facility]- Elective left Stealth craniotomy with resection of brain tumor due to ongoing symptoms. No intraoperative complications. EBL 50 ml.  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  7/5/22- Ct CAP- Right lower lobe low-density nodules are not significantly changed. A small left upper lobe pulmonary nodule is also unchanged. Trace pleural fluid on the right has increased slightly. No convincing evidence for metastatic disease in the abdomen or pelvis.  7/18/22 to 7/19/22- Admitted to [facility] for seizure- Reportedly was only taking once Keppra instead of twice daily. Also resumed on dexamethasone 2 mg daily  8/1/22- Brain MRI- Redemonstrated postsurgical changes status post left frontoparietal Craniotomy. Interval increase in size of the dominant ring-enhancing lesion in the left posterior superior frontal lobe with increased moderate surrounding vasogenic edema and local mass effect resulting in narrowing of the supratentorial ventricular system. No significant midline shift/herniation at this time    8/1/22- Dex was increased to 4 mg daily by Dr. Moran    9/1/22- CT Chest- Near resolution of previously seen right pleural nodule. Stable right lower lobe pulmonary nodule    9/28/22- Bevacizumab for radiation necrosis    10/26/22- Bevacizumab     10/26/22- Ct CAP- Stable posterior medial right lower lobe 1.9 x 1.1 cm nodule series 8 image 176. Adjacent stable scarring and atelectasis. The previously noted pleural nodule posteriorly on the right is not currently clearly identified. Stable left upper lobe 0.3 cm nodule image 56    10/26/22- Brain MRI- Overall improved appearance of multiple intracranial metastases with near resolution of associated edema and diminished enhancement and size of multiple residual lesions. No  definite new or progressive metastasis.    11/7/22 to 11/9/22- Admitted for PE and HTN urgency- Small pulmonary embolism in the right lower lobe pulmonary artery. started on Lovenox, Brain MRI neg for PRES.    12/29/22- ED visit- bilateral hip pain, pain in shoulders, knuckles, knees, and ankles- holding alectinib since 12/20/22 1/6/23- Ct CAP- Mild groundglass nodularity in the left upper lobe is new since the previous exam, and may be infectious in etiology. No other significant interval change. Pulmonary nodules are not significantly changed.    1/6/23- Brain MRI- Stable to diminished sequelae of intracranial metastasis and treatment changes. No new or progressive metastasis. No superimposed acute intracranial finding.     2/23/2022- Start Brigatinib. Dose reduced to 60 mg due to cough after two days of 90 mg daily -  3/23/23-Brigatinib  (increase to 90 mg)    4/20/23- CT CAP- Stable- Previously noted mild groundglass nodularity in the left upper lobe has resolved.Pulmonary nodules are unchanged.Trace amount pleural fluid on the right has decreased slightly.    4/20/23- Brain MRI- Possile progression- Largest metastasis within the right frontal lobe has increased in size with worsening peripheral nodular enhancement and worsening vasogenic edema contributing to new right to left midline shift.  Multiple new/enlarging metastases scattered throughout the cerebral hemispheres and cerebellum. Started on dexamethasone by NGS on 4/28/23 5/17/23- presents to clinic with glucose 627, admission to hospital for stability on insulin drip    6/16/23- Brain MRI- Interval increase in size of the necrotic lesion in the anterior aspect of the right frontal lobe from 2.4 x 2.3 x 2.4 cm previously to 3.0 x 3.5 x 2.8 cm on the current study. Mass effect due to slightly increased vasogenic edema surrounding the right frontal lesion resulted in increase of leftward midline shift of the anterior interhemispheric fissure from 0.7  cm previously to 0.9 cm currently. Interval increase in size in two adjacent metastases at the frontoparietal junction on the left. The remaining scattered intra-axial enhancing nodules are not changed appreciably in size or appearance since the comparison study.No evidence for acute intracranial pathology.   Dr. Moran- Due to worsening headaches and more problems with walking. Recommended a right Stealth craniotomy for resection of the lesion.     6/27/23- Right stealth craniotomy and resection of tumor: Final path: radiation necrosis    7/10/23- Ct CAP- stable Stable exam without evidence for new disease.Stable pulmonary nodules including a dominant nodular opacity at the right lower lobe.    7/20/23- Bevacizumab for radiation necrosis    8/16/23- Bevacizumab     9/12/23- Ct CAP-  Stable right lower lobe dominant nodular opacity. No new disease in the chest, abdomen and pelvis.     9/15, 10/6, 10/27, 11/17-  Bevacizumab    10/25/23- MRI Brain- 1. Redemonstrated postoperative changes of right frontal craniotomy. Decreased size of the right frontal resection cavity with significant decrease in the surrounding right frontal lobe vasogenic edema seen on the previous study. Mass effect has essentially resolved in the interim and there is no longer any midline shift. In the interim, slightly increased thickness of a rind of peripheral nodular enhancement along the posterior inferior and medial aspects of the right frontal lobe resection cavity, indeterminate. There is no significant elevated cerebral blood volume in this area. The findings may represent evolving posttreatment changes; however, residual tumor is not excluded.  Stable postoperative changes status post left anterior parietal craniotomy with irregular enhancement in the left frontal lobe parenchyma underlying the resection cavity, likely due to posttreatment change. Other scattered supratentorial and infratentorial metastatic lesions are overall similar to  "slightly decreased in size, as described.    12/5/23- PET/CT- with sole site of disease in the RLL measuring 1.6 x 1.4 cm and with SUV max of 4.2. No other sites of disease. His case was reviewed at tumor board and he met with Dr. Fu 12/14/24 with recommendations against surgical resection due to risk of significant pleural scarring. Continued on brigatinib 120 mg daily.     1/29/24 Brain MRI: No new metastatic lesions. No significant change in supratentorial and infratentorial subcentimeter metastatic enhancing lesions. Stable right inferior frontal and left high frontal postsurgical changes.     12/22-current: lost to follow up due to insurance issues    3/15/24- CT chest- Stable nodule medial right lower lobe. No metastatic disease demonstrated.    6/21/24- CT CAP- stable    6/26/24- Brain MRI- Numerous new and increased size of intracranial metastatic lesions. Increased nodular enhancement about the medial aspect of the right frontal lobe resection margin with increased adjacent T2/FLAIR hyperintense signal although without elevated cerebral blood volume could represent posttreatment changes although disease progression could have a similar appearance.  Stable left frontal lobe resection changes with stable underlying curvilinear enhancement.    7/10/24: start lorlatinib 100 mg daily    7/23/24-7/25/24: [facility] with syncopal episodes, suspected to be vasovagal due to low pressures      Interval Hx:  Seeing Connor in via video for follow up. He is at work at the time of the call. He missed his CT last week, rescheduled for 10/10.  He is on lorlatinib. He says he continues to have muscle aches and without the pain meds it is hard to manage. He is able to manage with the current pain meds. He has no restriction. He is taking methadone daily and oxycodone 1-2/day.   Otherwise He is feeling alright.   Not checking his BP recently, last time he chcked are ok.   Takes rosuvastatin.  Sugars are \"so-so\". Reports " compliance with BP meds and insulin. He is trying to control his BP.  He has not yet f/up with PCP for new DM meds.  Has had blurry vision for the past few weeks. Denies any focal neuro sx like loss of vision, imbalance, weakness, etc.   Otherwise, feels he is tolerating the lorlatinib well.     ECOG PS 0-1    REVIEW OF SYSTEMS: 14 point ROS negative other than the symptoms noted above in the HPI.    Wt Readings from Last 4 Encounters:   08/30/24 98.4 kg (217 lb)   08/01/24 99.8 kg (220 lb)   07/31/24 99.8 kg (220 lb)   07/30/24 99.8 kg (220 lb)      Review of Systems:  A comprehensive ROS was performed and found to be negative or non-contributory with the exception of that noted in the HPI above.    Past Medical History:  GERD  Hypertension, not on medication  Type 2 diabetes mellitus, not on medications currently, previously on Metformin    Past Surgical History:  Past Surgical History:   Procedure Laterality Date    BRONCHOSCOPY RIGID OR FLEXIBLE W/TRANSENDOSCOPIC ENDOBRONCHIAL ULTRASOUND GUIDED Bilateral 1/26/2022    Procedure: Right BRONCHOSCOPY, FIBEROPTIC, endobronchial ultrasound, pleural biopsy;  Surgeon: Dallin Agrawal MD;  Location: [facility] OR    INJECT BLOCK MEDIAL BRANCH CERVICAL/THORACIC/LUMBAR      INSERT CHEST TUBE Right 2/16/2022    Procedure: INSERTION, CATHETER, INTERCOSTAL, FOR DRAINAGE;  Surgeon: Dallin Agrawal MD;  Location: [facility] GI    INSERT CHEST TUBE Right 3/9/2022    Procedure: INSERTION, CATHETER, INTERCOSTAL, FOR DRAINAGE;  Surgeon: Sushila Antonio MD;  Location: [facility] GI    IR CHEST TUBE REMOVAL TUNNELED RIGHT  4/2/2022    OPTICAL TRACKING SYSTEM CRANIOTOMY, EXCISE TUMOR, COMBINED Left 6/28/2022    Procedure: Left stealth craniotomy for tumor resection with motor mapping;  Surgeon: Stephen Moran MD;  Location: [facility] OR    OPTICAL TRACKING SYSTEM CRANIOTOMY, EXCISE TUMOR, COMBINED Right 6/27/2023    Procedure: Right stealth craniotomy and resection of tumor;  Surgeon: Stephen Moran,  MD;  Location:  OR    ORTHOPEDIC SURGERY      Ganesh. Rotator cuff repair.    PLEUROSCOPY N/A 2022    Procedure: Pleuroscopy with Pleural Biopsy;  Surgeon: Dallin Agrawal MD;  Location:  OR       Social History:  Lives with wife and 4 kids in [place]. Works as a  for an apartment complex in [place]. Exposure to household chemicals and . No significant exposure to asbestos. No signal exposure to benzene or similar chemicals. No significant smoking history-states that he smoked 1 to 2 cigarettes occasionally per month for about 2 years in college, non-smoking since then. No significant alcohol use history. No other recreational substances. Good support system. Kids are 23, 19, 17 and 13.    Family History  Significant history for cancers on maternal side. Mother  of uterine cancer. 2 maternal uncles have possible metastatic melanoma.    Outpatient Medications:  Current Outpatient Medications   Medication Sig Dispense Refill    acetaminophen (TYLENOL) 325 MG tablet Take 325-650 mg by mouth every 6 hours as needed for mild pain      albuterol (PROAIR HFA/PROVENTIL HFA/VENTOLIN HFA) 108 (90 Base) MCG/ACT inhaler Inhale 2 puffs into the lungs every 6 hours as needed for shortness of breath, wheezing or cough 18 g 0    Alcohol Swabs PADS Use to swab the area of the injection or jabier as directed. Per insurance coverage 100 each 0    amLODIPine (NORVASC) 10 MG tablet Take 1 tablet (10 mg) by mouth daily. 90 tablet 0    blood glucose (NO BRAND SPECIFIED) lancets standard To use to test glucose level in the blood Use to test blood sugar  4  times daily as directed. To accompany glucose monitor brands per insurance coverage. 100 each 3    blood glucose (NO BRAND SPECIFIED) test strip To use to test glucose level in the blood Use to test blood sugar  4 times daily as directed. To accompany glucose monitor brands per insurance coverage. 100 strip 3    Blood Glucose Monitoring Suppl  (ACCU-CHEK GUIDE) w/Device KIT       Continuous Glucose Sensor (FREESTYLE IRENE 3 SENSOR) [facility] 1 each every 14 days. Use 1 sensor every 14 days. Use to read blood sugars per 's instructions. 6 each 3    cyclobenzaprine (FLEXERIL) 5 MG tablet Take 2 tablets (10 mg) by mouth nightly as needed for muscle spasms 30 tablet 4    DULoxetine (CYMBALTA) 30 MG capsule Take 1 capsule (30 mg) by mouth 2 times daily. 60 capsule 2    empagliflozin (JARDIANCE) 25 MG TABS tablet Take 1 tablet (25 mg) by mouth daily (Patient not taking: Reported on 9/4/2024) 90 tablet 1    FLUoxetine (PROZAC) 20 MG capsule Take 1 capsule (20 mg) by mouth daily 30 capsule 1    insulin aspart (NOVOLOG PEN) 100 UNIT/ML pen Inject 0-40 Units Subcutaneous 3 times daily (before meals) Per discharge instructions sliding scale (Patient not taking: Reported on 8/5/2024) 45 mL 2    insulin glargine (LANTUS PEN) 100 UNIT/ML pen Inject 70 Units Subcutaneous every morning 15 mL 4    insulin pen needle (32G X 4 MM) 32G X 4 MM miscellaneous Use as directed by provider. Per insurance coverage 100 each 0    levETIRAcetam (KEPPRA) 1000 MG tablet Take 1 tablet (1,000 mg) by mouth 2 times daily 60 tablet 11    lisinopril (ZESTRIL) 40 MG tablet Take 1 tablet (40 mg) by mouth daily 30 tablet 1    lorlatinib (LORBRENA) 100 MG Take 1 tablet (100 mg) by mouth daily 30 tablet 0    metFORMIN (GLUCOPHAGE) 500 MG tablet Take 1 tablet (500 mg) by mouth 2 times daily (with meals) 60 tablet 3    methadone (DOLOPHINE) 5 MG tablet Take 1 tablet (5 mg) by mouth 2 times daily. 60 tablet 0    methylphenidate (RITALIN) 5 MG tablet Take 1-2 tablets (5-10 mg) by mouth 2 times daily as needed (fatigue) Take second dose by 12 noon, if it is needed 90 tablet 0    naloxone (NARCAN) 4 MG/0.1ML nasal spray Spray 1 spray (4 mg) into one nostril alternating nostrils as needed for opioid reversal every 2-3 minutes until assistance arrives (Patient not taking: Reported on 9/4/2024) 0.2  mL 3    oxyCODONE IR (ROXICODONE) 10 MG tablet Take 1 tablet (10 mg) by mouth every 3 hours as needed for moderate pain. 120 tablet 0    prochlorperazine (COMPAZINE) 10 MG tablet Take 1 tablet (10 mg) by mouth every 6 hours as needed for nausea or vomiting (Patient not taking: Reported on 8/5/2024) 30 tablet 2    propranolol (INDERAL) 20 MG tablet Take 1 tablet (20 mg) by mouth 2 times daily 60 tablet 1    rivaroxaban ANTICOAGULANT (XARELTO) 20 MG TABS tablet Take 1 tablet (20 mg) by mouth daily (with dinner) 90 tablet 3    rosuvastatin (CRESTOR) 10 MG tablet Take 1 tablet (10 mg) by mouth daily. 90 tablet 2     No current facility-administered medications for this visit.     There were no vitals taken for this visit.  General: No acute distress, no rashes on skin.   HEENT: Sclera anicteric. Oral mucosa pink and moist.  No mucositis or thrush  Heart: Regular, rate, and rhythm  Lungs: Clear to ascultation bilaterally. Breathing comfortably  Abdomen: Positive bowel sounds. Soft, non-distended, non-tender.   Extremities: no lower extremity edema  Neuro: Cranial nerves grossly intact      Labs & Studies: I personally reviewed the following studies:  Most Recent 3 CBC's:  Recent Labs   Lab Test 10/04/24  1506 08/28/24  1157 07/31/24  1354   WBC 9.6 9.2 10.8   HGB 14.5 13.8 14.3   MCV 92 93 90    139* 178     Most Recent 3 BMP's:  Recent Labs   Lab Test 10/04/24  1506 08/28/24  1157 07/31/24  1354    142 140   POTASSIUM 3.8 4.0 3.7   CHLORIDE 99 105 102   CO2 24 25 25   BUN 13.2 13.0 13.3   CR 0.75 0.73 0.63*   ANIONGAP 13 12 13   TORY 9.5 8.9 9.1   * 417* 453*    Most Recent 2 LFT's:  Recent Labs   Lab Test 10/04/24  1506 08/28/24  1157   AST 20 19   ALT 29 26   ALKPHOS 105 118   BILITOTAL 0.3 0.2    Most Recent TSH and T4:  Recent Labs   Lab Test 09/12/22  1642   TSH 2.56        ASSESSMENT AND PLAN:  Stage IV NSCLC, Rt lung adenocarcinoma with metastasis to pleura, mediastinum , rt pleural effusion  and brain diagnosed 1/2022 (AJCC 8th edition)  PD-L1 TPS 2-3% by [place]   NGS [facility] panel-EML4:ALK rearragement; chr2:[id number], chr2:[id number]  NGS Guard[facility]- GNAS R201H, KRAS K5E- No ALK    He began Alectinib 600 mg BID 3/2/22 and unfortunately developed grade 3 myalgias  requiring dose reduction to 300 mg BID.   In Dec 2022,we stopped drug 12/20/22 due to unremitting arthalgias, eventully had to starte PO steroids which led to improvement and resolved. Radiographicaly, he has had a near CR to Rx in the lung, has a residual rt LL lesion.     Began brigatinib 2/22/23 (delayed due to pt hesitancy). Developed severe cough on day 2 so brigatinib was held and cough resolved with in 24-48 hours. He restarted 60 mg daily and upped it to 90 mg.Restging CT showing stable disease in the lung, however, MRI with several contrast enhancement and increase in size of previously treated lesions. His dose was increased to 180 mg daily to address possible CNS disease progression and started on dexamethasone. 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 Restaging CT in 9/2023 showing overall disease stablity with no evidence of active cancer. 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  PET/CT after these three months showed oligoperisstent disease with a single focus of active malignancy in the RLL. Met with Thoracic surgery 12/2023 to discuss resection but they recommended against it due to risk for scarring after. 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 MOst recent CT showing stable disease but brain MRI showing several possible new lesions and enlargement of exissting lesion (see below).     Switched Rx to Lorlatinib due to good CNS penetration. Now after about 12 weeks on therapy,  repeat MRI end of August (6 weeks on Rx) with overall positive response, but a few enlarging and edematous lesions. On discussionw ith Dr. Arango this was though to be Rx related changes and plan to to next MRI in 3 months. F/up CT is pending.   Overall tolerating Rx ok, has chronci MSK pain that is well controlled with pain meds. For Hypercholestroemia he is on cresotor, may need to escalate iof Tryglycerides continue to increase.    Plan  Continue lorlatinib   Monthly labs  RTC with NP/PA in 6 weeks  CT in 3 months  RTC after CT    #syncopal episodes: reviewed hospital notes with extensive work up. suspected to be vasovagal due to low pressures. No further episodes.     # Brain mets:   # Radiation necrosis,   -Baseline Brain MRI with several brain mets, s/p GK to 11-12 brain mets. F/u Brain MRI in June 2022 was showing enlargement of the one of the lesions along with edema, therefore had to undergo craniotomy followed by resection, the final biopsy consistent with radiation necrosis.   -received two doses bevacizumab for radiation necrosis in Fall 2022, tolerated poorly with HTN urgency and PE.)   -MRI in 4/2023 now showing contrast enhancement of lesions and a dominate lesion in the R frontal region with edema, several other smaller lesion. Short term MRI showing increase in size of the rt frontal lesion, underwent resection, patho again showing radiation necrosis. Restarted bevacizumab, which resulted in improved radiation necrosis / vasogenic edema on imaging in 10/2023 but ultimately was stopped due to uncontrolled HTN, last dose being 11/2023  -MRI imaging 1/2024 with stable disease and no edema.  -Brain MRI 3/2024 cancelled due to lapse in medical insurance.  -MRI 6/2024 showing showing several possible new lesions and enlargement of exissting lesion, however I am unsure if this is due to better imaging (perfusion MRI this time). Reviewed with rad onc and elected for short term MRI scan following  initiation of lorlatinib, hold off on GK.  -MRI 8/28/24 with overall positive treatment response from rx but Two metastases (right parietal, left cerebellar) appears slightly enlarged, and Increased vasogenic edema associated with right temporal and left cerebellar metastases.    9/4/24. Dr. Arango reviewed brain MRI and feels ongoing surveillance for now is adequate. R parietal lesion overall stable compared to April 2023 MRI and while L cerebellar is more pronounced, it is stable in size and in prior treatment.   Given he is asymptomatic and has grossly uncontrolled diabetes, I deferring dex at this time.     #HTN: continue lisinopril and propranolol, norvasc to 10 mg    #hypercholesteremia   Recently Increased rosuvastatin from 5 to 10 mg; confirmed he is taking, Tryglycierides increasing, f/unit(s) pcholestrol in 3 months    #Right pleuritic/chest pain  Felt to be 2/2 prior pleurex drain.   -On methadone and oxycodone; followed by Palliative Care      #Elevated Lipase  -mild elevation. No concern on exam    #Diabetes, Type 2  Hyperglycemia on labs. Compliant with insulin but not very adherent to diet. Started on metformin , and insulin. Workign with PCP for ?new ozempic    #PE: provoked by malignancy vs bevacizumab in 11/2022  -- on rivaroxiabn 20 mg daily    #Grade 2-3 arthralgias    All joints affected, no morning stiffness, normal ESR and CRP  -felt to be 2/2 treatment. Stable as of late      On the day of service  Chart review: 5 minutes  Visit duration: 30 minutes  Care coordination: 5 minutes    Bassam Persaud MD    Hematology, Oncology and Transplantation

## 2024-10-07 NOTE — NURSING NOTE
Patient confirms medications and allergies are accurate via patients echeck in completion, and or denies any changes since last reviewed/verified.       Current patient location: [id number] [facility] [address]  [facility] [address]    Is the patient currently in the state of [place]? NO    Visit mode:VIDEO    If the visit is dropped, the patient can be reconnected by: VIDEO VISIT: Text to cell phone:   Telephone Information:   Mobile [phone]       Will anyone else be joining the visit? NO  (If patient encounters technical issues they should call [phone])    Are changes needed to the allergy or medication list? No    Are refills needed on medications prescribed by this physician? NO    Rooming Documentation:  Questionnaire(s) completed    Reason for visit: RECHECK    Maame OCONNOR

## 2024-10-07 NOTE — PROGRESS NOTES
Social Work - Distress Screen Intervention  [facility]    Identified Concern and Score from Distress Screenin. How concerned are you about your ability to eat? 0     2. How concerned are you about unintended weight loss or your current weight? 0     3. How concerned are you about feeling depressed or very sad?  1     4. How concerned are you about feeling anxious or very scared?  1     5. Do you struggle with the loss of meaning and jesus in your life?  Somewhat     6. How concerned are you about work and home life issues that may be affected by your cancer?  1     7. How concerned are you about knowing what resources are available to help you?  (!) 10     8. Do you currently have what you would describe as Congregational or spiritual struggles? Not at all     9. If you want to be contacted by one of our professionals, I can send a message to them right now.  -- (no)       Date of Distress Screen: 10/7/24  Data: At time of last visit, patient scored positive on distress screening.  outreached to patient today to follow up on elevated distress and introduce psychosocial services and support.  Intervention/Education provided:  contacted patient by emmett to connect and provide contact information as patient asked for no follow up.  Follow-up Required:  will remain available to patient for support as needed  LISETH Randall, [facility] HospitalSW   Adult Oncology - [place]  [phone]  [facility] on    *Please note does not work on .   Support Groups at [facility]: Social Work Services for Cancer Patients ([email or web address])

## 2024-10-07 NOTE — LETTER
10/7/2024      Connor Emerson  [address]  [facility] [address]      Dear Colleague,    Thank you for referring your patient, Connor Emerson, to the [facility]. Please see a copy of my visit note below.    Virtual Visit Details    Type of service:  Video Visit   Video Start Time: 1:39 PM  Video End Time:1:39 PM    Originating Location (pt. Location): Home    Distant Location (provider location):  On-site  Platform used for Video Visit: [facility] ONCOLOGY FOLLOW UP NOTE    PATIENT NAME: Connor Emerson  ENCOUNTER DATE: Oct 7, 2024      Care Team  Primary Oncologist: Bassam Persaud MD    REASON FOR CURRENT VISIT: F/u of lung cancer    HISTORY OF PRESENT ILLNESS:  Mr. Connor Emerson is a 44 year old  male who is a non-smoker with PMHx of T2DM, HTN with metastatic NSCLC comes for follow up     Oncologic Hx:    Diagnosis:   Stage IV NSCLC, Rt lung adenocarcinoma with metastasis to pleura, mediastinum , rt pleural effusion and brain diagnosed 1/2022 (AJCC 8th edition)  PD-L1 TPS 2-3% by [facility]   NGS [facility] panel-EML4:ALK rearragement  NGS [facility]- GNAS R201H, KRAS K5E- No ALK    Treatment:   7/10/24-current: Lolatinib 100 mg daily    2/23/2022- 6/2024: Brigatinib. Dose reduced to 60 mg due to cough after two days of 90 mg daily -->back on 90 mg---> increased to 180 mg  ---> settled on 120 mg    6/27/23- Right stealth craniotomy and resection of tumor:     7/20/23- 11/14/24: Bevacizumab for radiation necrosis. Discontinued due to severe HTN.    Past:  2/15/22- GK to 11 brain lesions  3/2/22- 12/2022 Alectinib 300 mg BID (Dose reduced to 450 mg BID from 600 mg BID due to grade 3 myalgias 3/21/22, again reduced to 300 mg BID 9/28/22)  6/28/22- Craniotomy, resection  9/28/22-10/26- Bevacizumab for radiation necrosis (stopped due to PE)      Intent of treatment: Palliative    Oncologic course:  1/19/22 to 1/22/22-Admitted to [facility] for 2 week progressive SOB secondary to have  large rt sided pleural effusion, needing thoracentesis x2 (1.7L and 2.0 L removed), cytology positive for malignancy, adenocarcinoma.   1/26/22- Rt pleural mass biopsy-Dr. Agrawal--POSITIVE FOR ADENOCARCINOMA CONSISTENT WITH LUNG PRIMARY, admixed with mesothelial hyperplasia and inflammatory infiltrate (+ TTF-1 and CK 7;  negative  p40, calretinin and WT-1. PAX8 immunostain focal +). 4th thoracentesis done simultaneously - 3L approx removed.   2/1/22- PET/CT-Right lower lobe central infiltrative FDG avid 8.2 x 9.6 cm mass representing a primary lung adenocarcinoma. Ipsilateral right perihilar, bilateral pretracheal, subcarinal and superior mediastinal michele metastases. Contralateral mildly FDG avid few lung nodules are suspicious for contralateral metastasis. At least 3 intracranial metastases in the right frontal lobe, left frontal lobe and left cerebellar hemisphere. Nonspecific mild diffuse bone marrow uptake. Further evaluation with a spine MRI could be considered to rule out early marrow infiltration. This could also be seen with red marrow conversion.  2/5/22-  Brain MRI- At least 9 intracranial metastases as detailed above. The dominant lesions involving the orbital right frontal lobe, the posterior left middle frontal gyrus, anterior right temporal lobe and in the left cerebellar hemisphere have surrounding moderate vasogenic type edema.  2/15/22- Saw Dr. Arango from Rad Onc- Rcd GK to 12 lesion in bran  2/16/22- Pleurex placement   3/2/22- Started Alectinib 600 mg BID  3/21/22- Dose reduced to 450 mg BID due to grade 3 myalgias and fatigue  4/2/22 to 4/5/22- Admitted at [facility] for- Severe sepsis due to MSSA infection of right PleurX catheter s/p removal- He presented with onset of pain at tube site starting 4/1; at arrival was tachycardic with leukocytosis (22.7) and elevated lactic acid (2.9).  CT chest showed fluid and stranding tracking outside the pleural space into chest wall along pleural catheter.   IR was consulted and removed catheter 4/2 with report of pustular drainage and tip culture growing MSSA.  Thoracic Surg was consulted who felt no surgical indication necessary given minimal pleural fluid and lack of any signs of abscess.  Initially treated with broad spectrum coverage for sepsis, narrowed to Ancef once sensitivities returned with plan to transition to cefadroxil for an additional 10 days at discharge per ID. Held drug 4/2 to 4/11 5/2/22- CT CAP- Overall, positive response to therapy with decreased size of right lower lobe and right pleural-based masses, pulmonary metastases, hilar and mediastinal lymphadenopathy. However, a single right posterior pleural-based mass has slightly increased in size since 2/24/2022. No metastatic disease in the abdomen and pelvis. Right Pleurx catheter has been removed. Trace right pleural effusion and right basilar atelectasis.  5/2/22- Brain MRI- The previously demonstrated brain metastases are mildly diminished in size versus to 2/5/2022. The degree of edema is also diminished but not completely resolved. Probable trace amounts of intralesional bleeding demonstrated on the gradient sequence within the metastases. No definite new metastasis or progressive mass effect. No hydrocephalus or infarct.    6/15/22 to 6/17/22- Admitted at [facility]-with aphasia and word finding difficulty over last few weeks.  He presented to [facility] ED on 6/10 for evaluation of his symptoms. MRI brain showed multiple intracranial metastases, with interval enlargement of the dominant lesion within the left frontal lobe and increased surrounding vasogenic edema with 2 mm rightward shift of the septum pellucidum. Due to his worsening anxiety, he left AMA. His symptoms continued to progress to where he could not write at work so he decided to go to the ED for re-evaluation and treatment. Evaluated by NSGY, Rad Onc (radiaiton necrosis vs tumor progression).  6/16/22- MR Brain (6/16) shows multiple  intracranial metastases, with interval enlargement  of the dominant lesion within the left frontal lobe and increased surrounding vasogenic edema with 2 mm rightward shift of the septum pellucidum.  6/16/22- - CT CAP shows slightly decreased size of right lower lobe and right pleural-based masses. No new pulmonary nodules or lymphadenopathy; No evidence of metastatic disease in the abdomen or pelvis.   6/28/22 to 6/30/22- Admitted at [facility]- Elective left Stealth craniotomy with resection of brain tumor due to ongoing symptoms. No intraoperative complications. EBL 50 ml.  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  7/5/22- Ct CAP- Right lower lobe low-density nodules are not significantly changed. A small left upper lobe pulmonary nodule is also unchanged. Trace pleural fluid on the right has increased slightly. No convincing evidence for metastatic disease in the abdomen or pelvis.  7/18/22 to 7/19/22- Admitted to [facility] for seizure- Reportedly was only taking once Keppra instead of twice daily. Also resumed on dexamethasone 2 mg daily  8/1/22- Brain MRI- Redemonstrated postsurgical changes status post left frontoparietal Craniotomy. Interval increase in size of the dominant ring-enhancing lesion in the left posterior superior frontal lobe with increased moderate surrounding vasogenic edema and local mass effect resulting in narrowing of the supratentorial ventricular system. No significant midline shift/herniation at this time    8/1/22- Dex was increased to 4 mg daily by Dr. Moran    9/1/22- CT Chest- Near resolution of previously seen right pleural nodule. Stable right lower lobe pulmonary nodule    9/28/22- Bevacizumab for radiation necrosis    10/26/22- Bevacizumab     10/26/22- Ct CAP- Stable posterior medial right lower lobe 1.9 x 1.1 cm nodule series 8 image 176. Adjacent stable scarring and atelectasis. The previously noted pleural nodule posteriorly on the right is not currently clearly identified.  Stable left upper lobe 0.3 cm nodule image 56    10/26/22- Brain MRI- Overall improved appearance of multiple intracranial metastases with near resolution of associated edema and diminished enhancement and size of multiple residual lesions. No definite new or progressive metastasis.    11/7/22 to 11/9/22- Admitted for PE and HTN urgency- Small pulmonary embolism in the right lower lobe pulmonary artery. started on Lovenox, Brain MRI neg for PRES.    12/29/22- ED visit- bilateral hip pain, pain in shoulders, knuckles, knees, and ankles- holding alectinib since 12/20/22 1/6/23- Ct CAP- Mild groundglass nodularity in the left upper lobe is new since the previous exam, and may be infectious in etiology. No other significant interval change. Pulmonary nodules are not significantly changed.    1/6/23- Brain MRI- Stable to diminished sequelae of intracranial metastasis and treatment changes. No new or progressive metastasis. No superimposed acute intracranial finding.     2/23/2022- Start Brigatinib. Dose reduced to 60 mg due to cough after two days of 90 mg daily -  3/23/23-Brigatinib  (increase to 90 mg)    4/20/23- CT CAP- Stable- Previously noted mild groundglass nodularity in the left upper lobe has resolved.Pulmonary nodules are unchanged.Trace amount pleural fluid on the right has decreased slightly.    4/20/23- Brain MRI- Possile progression- Largest metastasis within the right frontal lobe has increased in size with worsening peripheral nodular enhancement and worsening vasogenic edema contributing to new right to left midline shift.  Multiple new/enlarging metastases scattered throughout the cerebral hemispheres and cerebellum. Started on dexamethasone by NGS on 4/28/23 5/17/23- presents to clinic with glucose 627, admission to hospital for stability on insulin drip    6/16/23- Brain MRI- Interval increase in size of the necrotic lesion in the anterior aspect of the right frontal lobe from 2.4 x 2.3 x 2.4  cm previously to 3.0 x 3.5 x 2.8 cm on the current study. Mass effect due to slightly increased vasogenic edema surrounding the right frontal lesion resulted in increase of leftward midline shift of the anterior interhemispheric fissure from 0.7 cm previously to 0.9 cm currently. Interval increase in size in two adjacent metastases at the frontoparietal junction on the left. The remaining scattered intra-axial enhancing nodules are not changed appreciably in size or appearance since the comparison study.No evidence for acute intracranial pathology.   Dr. Moran- Due to worsening headaches and more problems with walking. Recommended a right Stealth craniotomy for resection of the lesion.     6/27/23- Right stealth craniotomy and resection of tumor: Final path: radiation necrosis    7/10/23- Ct CAP- stable Stable exam without evidence for new disease.Stable pulmonary nodules including a dominant nodular opacity at the right lower lobe.    7/20/23- Bevacizumab for radiation necrosis    8/16/23- Bevacizumab     9/12/23- Ct CAP-  Stable right lower lobe dominant nodular opacity. No new disease in the chest, abdomen and pelvis.     9/15, 10/6, 10/27, 11/17-  Bevacizumab    10/25/23- MRI Brain- 1. Redemonstrated postoperative changes of right frontal craniotomy. Decreased size of the right frontal resection cavity with significant decrease in the surrounding right frontal lobe vasogenic edema seen on the previous study. Mass effect has essentially resolved in the interim and there is no longer any midline shift. In the interim, slightly increased thickness of a rind of peripheral nodular enhancement along the posterior inferior and medial aspects of the right frontal lobe resection cavity, indeterminate. There is no significant elevated cerebral blood volume in this area. The findings may represent evolving posttreatment changes; however, residual tumor is not excluded.  Stable postoperative changes status post left anterior  parietal craniotomy with irregular enhancement in the left frontal lobe parenchyma underlying the resection cavity, likely due to posttreatment change. Other scattered supratentorial and infratentorial metastatic lesions are overall similar to slightly decreased in size, as described.    12/5/23- PET/CT- with sole site of disease in the RLL measuring 1.6 x 1.4 cm and with SUV max of 4.2. No other sites of disease. His case was reviewed at tumor board and he met with Dr. Fu 12/14/24 with recommendations against surgical resection due to risk of significant pleural scarring. Continued on brigatinib 120 mg daily.     1/29/24 Brain MRI: No new metastatic lesions. No significant change in supratentorial and infratentorial subcentimeter metastatic enhancing lesions. Stable right inferior frontal and left high frontal postsurgical changes.     12/22-current: lost to follow up due to insurance issues    3/15/24- CT chest- Stable nodule medial right lower lobe. No metastatic disease demonstrated.    6/21/24- CT CAP- stable    6/26/24- Brain MRI- Numerous new and increased size of intracranial metastatic lesions. Increased nodular enhancement about the medial aspect of the right frontal lobe resection margin with increased adjacent T2/FLAIR hyperintense signal although without elevated cerebral blood volume could represent posttreatment changes although disease progression could have a similar appearance.  Stable left frontal lobe resection changes with stable underlying curvilinear enhancement.    7/10/24: start lorlatinib 100 mg daily    7/23/24-7/25/24: [facility] with syncopal episodes, suspected to be vasovagal due to low pressures      Interval Hx:  Seeing Connor in via video for follow up. He is at work at the time of the call. He missed his CT last week, rescheduled for 10/10.  He is on lorlatinib. He says he continues to have muscle aches and without the pain meds it is hard to manage. He is able to manage with the  "current pain meds. He has no restriction. He is taking methadone daily and oxycodone 1-2/day.   Otherwise He is feeling alright.   Not checking his BP recently, last time he chcked are ok.   Takes rosuvastatin.  Sugars are \"so-so\". Reports compliance with BP meds and insulin. He is trying to control his BP.  He has not yet f/up with PCP for new DM meds.  Has had blurry vision for the past few weeks. Denies any focal neuro sx like loss of vision, imbalance, weakness, etc.   Otherwise, feels he is tolerating the lorlatinib well.     ECOG PS 0-1    REVIEW OF SYSTEMS: 14 point ROS negative other than the symptoms noted above in the HPI.    Wt Readings from Last 4 Encounters:   08/30/24 98.4 kg (217 lb)   08/01/24 99.8 kg (220 lb)   07/31/24 99.8 kg (220 lb)   07/30/24 99.8 kg (220 lb)      Review of Systems:  A comprehensive ROS was performed and found to be negative or non-contributory with the exception of that noted in the HPI above.    Past Medical History:  GERD  Hypertension, not on medication  Type 2 diabetes mellitus, not on medications currently, previously on Metformin    Past Surgical History:  Past Surgical History:   Procedure Laterality Date     BRONCHOSCOPY RIGID OR FLEXIBLE W/TRANSENDOSCOPIC ENDOBRONCHIAL ULTRASOUND GUIDED Bilateral 1/26/2022    Procedure: Right BRONCHOSCOPY, FIBEROPTIC, endobronchial ultrasound, pleural biopsy;  Surgeon: Dallin Agrawal MD;  Location: [facility] OR     INJECT BLOCK MEDIAL BRANCH CERVICAL/THORACIC/LUMBAR       INSERT CHEST TUBE Right 2/16/2022    Procedure: INSERTION, CATHETER, INTERCOSTAL, FOR DRAINAGE;  Surgeon: Dallin Agrawal MD;  Location: [facility] GI     INSERT CHEST TUBE Right 3/9/2022    Procedure: INSERTION, CATHETER, INTERCOSTAL, FOR DRAINAGE;  Surgeon: Sushila Antonio MD;  Location: [facility] GI     IR CHEST TUBE REMOVAL TUNNELED RIGHT  4/2/2022     OPTICAL TRACKING SYSTEM CRANIOTOMY, EXCISE TUMOR, COMBINED Left 6/28/2022    Procedure: Left stealth craniotomy for tumor resection " with motor mapping;  Surgeon: Stephen Moran MD;  Location:  OR     OPTICAL TRACKING SYSTEM CRANIOTOMY, EXCISE TUMOR, COMBINED Right 2023    Procedure: Right stealth craniotomy and resection of tumor;  Surgeon: Stephen Moran MD;  Location:  OR     ORTHOPEDIC SURGERY      Ganesh. Rotator cuff repair.     PLEUROSCOPY N/A 2022    Procedure: Pleuroscopy with Pleural Biopsy;  Surgeon: Dallin Agrawal MD;  Location:  OR       Social History:  Lives with wife and 4 kids in [place]. Works as a  for an [employer] complex in [place]. Exposure to household chemicals and . No significant exposure to asbestos. No signal exposure to benzene or similar chemicals. No significant smoking history-states that he smoked 1 to 2 cigarettes occasionally per month for about 2 years in college, non-smoking since then. No significant alcohol use history. No other recreational substances. Good support system. Kids are 23, 19, 17 and 13.    Family History  Significant history for cancers on maternal side. Mother  of uterine cancer. 2 maternal uncles have possible metastatic melanoma.    Outpatient Medications:  Current Outpatient Medications   Medication Sig Dispense Refill     acetaminophen (TYLENOL) 325 MG tablet Take 325-650 mg by mouth every 6 hours as needed for mild pain       albuterol (PROAIR HFA/PROVENTIL HFA/VENTOLIN HFA) 108 (90 Base) MCG/ACT inhaler Inhale 2 puffs into the lungs every 6 hours as needed for shortness of breath, wheezing or cough 18 g 0     Alcohol Swabs PADS Use to swab the area of the injection or jabier as directed. Per insurance coverage 100 each 0     amLODIPine (NORVASC) 10 MG tablet Take 1 tablet (10 mg) by mouth daily. 90 tablet 0     blood glucose (NO BRAND SPECIFIED) lancets standard To use to test glucose level in the blood Use to test blood sugar  4  times daily as directed. To accompany glucose monitor brands per insurance coverage. 100 each  3     blood glucose (NO BRAND SPECIFIED) test strip To use to test glucose level in the blood Use to test blood sugar  4 times daily as directed. To accompany glucose monitor brands per insurance coverage. 100 strip 3     Blood Glucose Monitoring Suppl (ACCU-CHEK GUIDE) w/Device KIT        Continuous Glucose Sensor (FREESTYLE IRENE 3 SENSOR) MISC 1 each every 14 days. Use 1 sensor every 14 days. Use to read blood sugars per 's instructions. 6 each 3     cyclobenzaprine (FLEXERIL) 5 MG tablet Take 2 tablets (10 mg) by mouth nightly as needed for muscle spasms 30 tablet 4     DULoxetine (CYMBALTA) 30 MG capsule Take 1 capsule (30 mg) by mouth 2 times daily. 60 capsule 2     empagliflozin (JARDIANCE) 25 MG TABS tablet Take 1 tablet (25 mg) by mouth daily (Patient not taking: Reported on 9/4/2024) 90 tablet 1     FLUoxetine (PROZAC) 20 MG capsule Take 1 capsule (20 mg) by mouth daily 30 capsule 1     insulin aspart (NOVOLOG PEN) 100 UNIT/ML pen Inject 0-40 Units Subcutaneous 3 times daily (before meals) Per discharge instructions sliding scale (Patient not taking: Reported on 8/5/2024) 45 mL 2     insulin glargine (LANTUS PEN) 100 UNIT/ML pen Inject 70 Units Subcutaneous every morning 15 mL 4     insulin pen needle (32G X 4 MM) 32G X 4 MM miscellaneous Use as directed by provider. Per insurance coverage 100 each 0     levETIRAcetam (KEPPRA) 1000 MG tablet Take 1 tablet (1,000 mg) by mouth 2 times daily 60 tablet 11     lisinopril (ZESTRIL) 40 MG tablet Take 1 tablet (40 mg) by mouth daily 30 tablet 1     lorlatinib (LORBRENA) 100 MG Take 1 tablet (100 mg) by mouth daily 30 tablet 0     metFORMIN (GLUCOPHAGE) 500 MG tablet Take 1 tablet (500 mg) by mouth 2 times daily (with meals) 60 tablet 3     methadone (DOLOPHINE) 5 MG tablet Take 1 tablet (5 mg) by mouth 2 times daily. 60 tablet 0     methylphenidate (RITALIN) 5 MG tablet Take 1-2 tablets (5-10 mg) by mouth 2 times daily as needed (fatigue) Take second  dose by 12 noon, if it is needed 90 tablet 0     naloxone (NARCAN) 4 MG/0.1ML nasal spray Spray 1 spray (4 mg) into one nostril alternating nostrils as needed for opioid reversal every 2-3 minutes until assistance arrives (Patient not taking: Reported on 9/4/2024) 0.2 mL 3     oxyCODONE IR (ROXICODONE) 10 MG tablet Take 1 tablet (10 mg) by mouth every 3 hours as needed for moderate pain. 120 tablet 0     prochlorperazine (COMPAZINE) 10 MG tablet Take 1 tablet (10 mg) by mouth every 6 hours as needed for nausea or vomiting (Patient not taking: Reported on 8/5/2024) 30 tablet 2     propranolol (INDERAL) 20 MG tablet Take 1 tablet (20 mg) by mouth 2 times daily 60 tablet 1     rivaroxaban ANTICOAGULANT (XARELTO) 20 MG TABS tablet Take 1 tablet (20 mg) by mouth daily (with dinner) 90 tablet 3     rosuvastatin (CRESTOR) 10 MG tablet Take 1 tablet (10 mg) by mouth daily. 90 tablet 2     No current facility-administered medications for this visit.     There were no vitals taken for this visit.  General: No acute distress, no rashes on skin.   HEENT: Sclera anicteric. Oral mucosa pink and moist.  No mucositis or thrush  Heart: Regular, rate, and rhythm  Lungs: Clear to ascultation bilaterally. Breathing comfortably  Abdomen: Positive bowel sounds. Soft, non-distended, non-tender.   Extremities: no lower extremity edema  Neuro: Cranial nerves grossly intact      Labs & Studies: I personally reviewed the following studies:  Most Recent 3 CBC's:  Recent Labs   Lab Test 10/04/24  1506 08/28/24  1157 07/31/24  1354   WBC 9.6 9.2 10.8   HGB 14.5 13.8 14.3   MCV 92 93 90    139* 178     Most Recent 3 BMP's:  Recent Labs   Lab Test 10/04/24  1506 08/28/24  1157 07/31/24  1354    142 140   POTASSIUM 3.8 4.0 3.7   CHLORIDE 99 105 102   CO2 24 25 25   BUN 13.2 13.0 13.3   CR 0.75 0.73 0.63*   ANIONGAP 13 12 13   TORY 9.5 8.9 9.1   * 417* 453*    Most Recent 2 LFT's:  Recent Labs   Lab Test 10/04/24  150  08/28/24  1157   AST 20 19   ALT 29 26   ALKPHOS 105 118   BILITOTAL 0.3 0.2    Most Recent TSH and T4:  Recent Labs   Lab Test 09/12/22  1642   TSH 2.56        ASSESSMENT AND PLAN:  Stage IV NSCLC, Rt lung adenocarcinoma with metastasis to pleura, mediastinum , rt pleural effusion and brain diagnosed 1/2022 (AJCC 8th edition)  PD-L1 TPS 2-3% by [place]   NGS [facility] panel-EML4:ALK rearragement; chr2:[id number], chr2:[id number]  NGS [facility]- GNAS R201H, KRAS K5E- No ALK    He began Alectinib 600 mg BID 3/2/22 and unfortunately developed grade 3 myalgias  requiring dose reduction to 300 mg BID.   In Dec 2022,we stopped drug 12/20/22 due to unremitting arthalgias, eventully had to starte PO steroids which led to improvement and resolved. Radiographicaly, he has had a near CR to Rx in the lung, has a residual rt LL lesion.     Began brigatinib 2/22/23 (delayed due to pt hesitancy). Developed severe cough on day 2 so brigatinib was held and cough resolved with in 24-48 hours. He restarted 60 mg daily and upped it to 90 mg.Restging CT showing stable disease in the lung, however, MRI with several contrast enhancement and increase in size of previously treated lesions. His dose was increased to 180 mg daily to address possible CNS disease progression and started on dexamethasone. 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 Restaging CT in 9/2023 showing overall disease stablity with no evidence of active cancer. 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  PET/CT after these three months showed oligoperisstent disease with a single focus of active malignancy in the RLL. Met with Thoracic surgery 12/2023 to discuss resection but they recommended against it due to risk for scarring after. He has not yet met with radiation for SBRT for more definitive  disease control of the oligopersistent disease. MOst recent CT showing stable disease but brain MRI showing several possible new lesions and enlargement of exissting lesion (see below).     Switched Rx to Lorlatinib due to good CNS penetration. Now after about 12 weeks on therapy, repeat MRI end of August (6 weeks on Rx) with overall positive response, but a few enlarging and edematous lesions. On discussionw ith Dr. Arango this was though to be Rx related changes and plan to to next MRI in 3 months. F/up CT is pending.   Overall tolerating Rx ok, has chronci MSK pain that is well controlled with pain meds. For Hypercholestroemia he is on cresotor, may need to escalate iof Tryglycerides continue to increase.    Plan  Continue lorlatinib   Monthly labs  RTC with NP/PA in 6 weeks  CT in 3 months  RTC after CT    #syncopal episodes: reviewed hospital notes with extensive work up. suspected to be vasovagal due to low pressures. No further episodes.     # Brain mets:   # Radiation necrosis,   -Baseline Brain MRI with several brain mets, s/p GK to 11-12 brain mets. F/u Brain MRI in June 2022 was showing enlargement of the one of the lesions along with edema, therefore had to undergo craniotomy followed by resection, the final biopsy consistent with radiation necrosis.   -received two doses bevacizumab for radiation necrosis in Fall 2022, tolerated poorly with HTN urgency and PE.)   -MRI in 4/2023 now showing contrast enhancement of lesions and a dominate lesion in the R frontal region with edema, several other smaller lesion. Short term MRI showing increase in size of the rt frontal lesion, underwent resection, patho again showing radiation necrosis. Restarted bevacizumab, which resulted in improved radiation necrosis / vasogenic edema on imaging in 10/2023 but ultimately was stopped due to uncontrolled HTN, last dose being 11/2023  -MRI imaging 1/2024 with stable disease and no edema.  -Brain MRI 3/2024 cancelled due  to lapse in medical insurance.  -MRI 6/2024 showing showing several possible new lesions and enlargement of exissting lesion, however I am unsure if this is due to better imaging (perfusion MRI this time). Reviewed with rad onc and elected for short term MRI scan following initiation of lorlatinib, hold off on GK.  -MRI 8/28/24 with overall positive treatment response from rx but Two metastases (right parietal, left cerebellar) appears slightly enlarged, and Increased vasogenic edema associated with right temporal and left cerebellar metastases.    9/4/24. Dr. Arango reviewed brain MRI and feels ongoing surveillance for now is adequate. R parietal lesion overall stable compared to April 2023 MRI and while L cerebellar is more pronounced, it is stable in size and in prior treatment.   Given he is asymptomatic and has grossly uncontrolled diabetes, I deferring dex at this time.     #HTN: continue lisinopril and propranolol, norvasc to 10 mg    #hypercholesteremia   Recently Increased rosuvastatin from 5 to 10 mg; confirmed he is taking, Tryglycierides increasing, f/unit(s) pcholestrol in 3 months    #Right pleuritic/chest pain  Felt to be 2/2 prior pleurex drain.   -On methadone and oxycodone; followed by Palliative Care      #Elevated Lipase  -mild elevation. No concern on exam    #Diabetes, Type 2  Hyperglycemia on labs. Compliant with insulin but not very adherent to diet. Started on metformin , and insulin. Workign with PCP for ?new ozempic    #PE: provoked by malignancy vs bevacizumab in 11/2022  -- on rivaroxiabn 20 mg daily    #Grade 2-3 arthralgias    All joints affected, no morning stiffness, normal ESR and CRP  -felt to be 2/2 treatment. Stable as of late      On the day of service  Chart review: 5 minutes  Visit duration: 30 minutes  Care coordination: 5 minutes    Bassam Persaud MD    Hematology, Oncology and Transplantation         Again, thank you for allowing me to participate in  the care of your patient.        Sincerely,        Bassam Persaud MD

## 2024-10-08 DIAGNOSIS — Z79.899 ENCOUNTER FOR LONG-TERM (CURRENT) USE OF MEDICATIONS: ICD-10-CM

## 2024-10-08 DIAGNOSIS — C34.90 PRIMARY MALIGNANT NEOPLASM OF LUNG METASTATIC TO OTHER SITE, UNSPECIFIED LATERALITY (H): Primary | ICD-10-CM
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
## 2024-10-09 DIAGNOSIS — I67.89 RADIATION THERAPY INDUCED BRAIN NECROSIS: ICD-10-CM

## 2024-10-09 DIAGNOSIS — C34.31 MALIGNANT NEOPLASM OF LOWER LOBE OF RIGHT LUNG (H): Primary | ICD-10-CM

## 2024-10-11 ENCOUNTER — HOSPITAL ENCOUNTER (OUTPATIENT)
Dept: CARDIOLOGY | Facility: CLINIC | Age: 44
Discharge: HOME | End: 2024-10-11
Attending: STUDENT IN AN ORGANIZED HEALTH CARE EDUCATION/TRAINING PROGRAM | Admitting: STUDENT IN AN ORGANIZED HEALTH CARE EDUCATION/TRAINING PROGRAM
Payer: COMMERCIAL

## 2024-10-11 DIAGNOSIS — Z79.899 ENCOUNTER FOR LONG-TERM (CURRENT) USE OF MEDICATIONS: ICD-10-CM

## 2024-10-11 DIAGNOSIS — C34.90 PRIMARY MALIGNANT NEOPLASM OF LUNG METASTATIC TO OTHER SITE, UNSPECIFIED LATERALITY (H): ICD-10-CM

## 2024-10-11 PROCEDURE — 93010 ELECTROCARDIOGRAM REPORT: CPT | Performed by: INTERNAL MEDICINE

## 2024-10-11 PROCEDURE — 93005 ELECTROCARDIOGRAM TRACING: CPT

## 2024-10-15 ENCOUNTER — TELEPHONE (OUTPATIENT)
Dept: ONCOLOGY | Facility: CLINIC | Age: 44
End: 2024-10-15
Payer: COMMERCIAL

## 2024-10-16 ENCOUNTER — VIRTUAL VISIT (OUTPATIENT)
Dept: PHARMACY | Facility: CLINIC | Age: 44
End: 2024-10-16
Payer: COMMERCIAL

## 2024-10-16 ENCOUNTER — MYC REFILL (OUTPATIENT)
Dept: FAMILY MEDICINE | Facility: CLINIC | Age: 44
End: 2024-10-16
Payer: COMMERCIAL

## 2024-10-16 ENCOUNTER — MYC REFILL (OUTPATIENT)
Dept: PALLIATIVE CARE | Facility: CLINIC | Age: 44
End: 2024-10-16
Payer: COMMERCIAL

## 2024-10-16 ENCOUNTER — MYC REFILL (OUTPATIENT)
Dept: PHARMACY | Facility: CLINIC | Age: 44
End: 2024-10-16
Payer: COMMERCIAL

## 2024-10-16 DIAGNOSIS — E11.65 TYPE 2 DIABETES MELLITUS WITH HYPERGLYCEMIA, WITHOUT LONG-TERM CURRENT USE OF INSULIN (H): ICD-10-CM

## 2024-10-16 DIAGNOSIS — Z79.4 TYPE 2 DIABETES MELLITUS WITHOUT COMPLICATION, WITH LONG-TERM CURRENT USE OF INSULIN (H): ICD-10-CM

## 2024-10-16 DIAGNOSIS — Z79.4 TYPE 2 DIABETES MELLITUS WITHOUT COMPLICATION, WITH LONG-TERM CURRENT USE OF INSULIN (H): Primary | ICD-10-CM

## 2024-10-16 DIAGNOSIS — E11.9 TYPE 2 DIABETES MELLITUS WITHOUT COMPLICATION, WITH LONG-TERM CURRENT USE OF INSULIN (H): ICD-10-CM

## 2024-10-16 DIAGNOSIS — D49.6 BRAIN TUMOR (H): ICD-10-CM

## 2024-10-16 DIAGNOSIS — Z98.890 S/P CRANIOTOMY: ICD-10-CM

## 2024-10-16 DIAGNOSIS — E11.9 TYPE 2 DIABETES MELLITUS WITHOUT COMPLICATION, WITH LONG-TERM CURRENT USE OF INSULIN (H): Primary | ICD-10-CM

## 2024-10-16 PROCEDURE — 99606 MTMS BY PHARM EST 15 MIN: CPT | Mod: 93 | Performed by: PHARMACIST

## 2024-10-16 RX ORDER — ACYCLOVIR 800 MG/1
1 TABLET ORAL
Qty: 6 EACH | Refills: 3 | OUTPATIENT
Start: 2024-10-16

## 2024-10-16 RX ORDER — OXYCODONE HYDROCHLORIDE 10 MG/1
10 TABLET ORAL
Qty: 120 TABLET | Refills: 0 | Status: SHIPPED | OUTPATIENT
Start: 2024-10-19 | End: 2024-10-28

## 2024-10-16 NOTE — TELEPHONE ENCOUNTER
Received Transervt message from patient requesting refill of oxycodone.     Last refill: 10/5/56318  Last office visit: 8/1/2024  Scheduled for follow up 10/21/2024     Will route request to MD/ for review.     Reviewed [place]  Report.

## 2024-10-16 NOTE — ORAL ONC MGMT
Oral Chemotherapy Monitoring Program     Placed call 10/15/2024 to patient in follow up of oral chemotherapy. Left message requesting call back. No drug names were mentioned. Will update when response received.     Celso Nichols, PharmD  Hematology/Oncology Clinical Pharmacist  [facility]  [facility]

## 2024-10-16 NOTE — PATIENT INSTRUCTIONS
"Recommendations from today's MTM visit:                                                       Increase Lantus to 80 units once daily. If you continue to have high blood sugars after these changes today please reach out so we can continue to adjust your Lantus.  Schedule with diabetes education to help with modifying diet further to improve blood sugars. Someone should call to schedule but if you don't hear from someone by end of week you can call [phone] to schedule.  Start Ozempic  Start Ozempic 0.25 mg subcutaneous injection once weekly for 4 weeks, then increase to Ozempic 0.5 mg weekly starting on Week 5 if tolerating.   Refrigerate until first use. Then may keep at room temperature for up to 8 weeks   Potential side effects: nausea, headache, diarrhea, stomach upset.  If these become unmanageable or concerning symptoms, please make sure to call.  The first Ozempic pen fill will last 6 weeks, all fills of the Ozempic pen after this will last 4 weeks.     Follow-up: Return in 26 days (on 11/11/2024).    It was great speaking with you today.  I value your experience and would be very thankful for your time in providing feedback in our clinic survey. In the next few days, you may receive an email or text message from [employer] with a link to a survey related to your  clinical pharmacist.\"     To schedule another MTM appointment, please call the clinic directly or you may call the MTM scheduling line at [phone] or toll-free at [phone].     My Clinical Pharmacist's contact information:                                                      Please feel free to contact me with any questions or concerns you have.      Prabha Ling, PharmD  Medication Therapy Management Pharmacist  [facility] - Monday and Wednesday 7:30 - 4:00    "

## 2024-10-16 NOTE — PROGRESS NOTES
Medication Therapy Management (MTM) Encounter    ASSESSMENT:                            Medication Adherence/Access: No issues identified.    Diabetes   He would benefit from starting Ozempic with all his providers being on board. Educated him on how Ozempic works, how to use it and possible side effects, see plan. He would also benefit from increasing Lantus with his average blood sugar over 300, see continuous glucose monitor data below. May need to adjust his Lantus further before next visit. Will need to split dose if going above 80 units as max dose dial is 80 and would prefer to split. Asked him to reach out if he continues to have elevated blood sugars after changes today. He would also benefit from diabetes education, order placed today. His chemotherapy medication could be contributing to his elevated blood sugar as well.    PLAN:                            Increase Lantus to 80 units once daily. If you continue to have high blood sugars after these changes today please reach out so we can continue to adjust your Lantus.  Schedule with diabetes education to help with modifying diet further to improve blood sugars. Someone should call to schedule but if you don't hear from someone by end of week you can call [phone] to schedule.  Start Ozempic  Start Ozempic 0.25 mg subcutaneous injection once weekly for 4 weeks, then increase to Ozempic 0.5 mg weekly starting on Week 5 if tolerating.   Refrigerate until first use. Then may keep at room temperature for up to 8 weeks   Potential side effects: nausea, headache, diarrhea, stomach upset.  If these become unmanageable or concerning symptoms, please make sure to call.  The first Ozempic pen fill will last 6 weeks, all fills of the Ozempic pen after this will last 4 weeks.     Follow-up: Return in 26 days (on 11/11/2024).    SUBJECTIVE/OBJECTIVE:                          Connor Emerosn is a 44 year old male seen for a follow-up visit from 9/4/24.       Reason  for visit: follow-up diabetes.    Allergies/ADRs: Reviewed in chart  Past Medical History: Reviewed in chart  Tobacco: He reports that he has never smoked. He has never been exposed to tobacco smoke. He has never used smokeless tobacco.  Alcohol: not currently using    Medication Adherence/Access: no issues reported.    Diabetes   Jardiance 25 mg daily   Metformin 500 mg twice a day - no diarrhea. Had diarrhea in the past before at higher doses.   Lantus 71 units daily    Patient reports no side effects.  He does not have history of pancreatitis and no personal or family history of medullary thyroid cancer. I did reach out to his vascular, palliative care and oncology providers and none of them have concerns with him starting a GLP-1.     Diet/Exercise: He has not been eating any foods with sugars. He is drinking a lot of water. He doesn't eat breakfast. He has two sandwiches at lunch.  Dinner is usually healthy but having vegetables. He is not snacking and doesn't eat sweets.     Blood sugar monitoring: Continuous Glucose Monitor see AGP below                 Eye exam is up to date  Foot exam: due    Today's Vitals: There were no vitals taken for this visit.  ----------------    I spent 10 minutes with this patient today. All changes were made via collaborative practice agreement with Jessica Mohr MD. A copy of the visit note was provided to the patient's provider(s).    A summary of these recommendations was sent via [employer].    Prabha Ling, RenéeD  Medication Therapy Management Pharmacist    Telemedicine Visit Details  The patient's medications can be safely assessed via a telemedicine encounter.  Type of service:  Telephone visit  Originating Location (pt. Location): Home  Distant Location (provider location):  On-site  Start Time: 9:44 AM  End Time: 10:04 AM     Medication Therapy Recommendations  Type 2 diabetes mellitus without complication (H)    Current Medication: insulin glargine (LANTUS PEN) 100 UNIT/ML  pen (Discontinued)   Rationale: Synergistic therapy - Needs additional medication therapy - Indication   Recommendation: Start Medication - Ozempic (0.25 or 0.5 MG/DOSE) 2 MG/1.5ML Sopn   Status: Accepted per CPA          Current Medication: insulin glargine (LANTUS PEN) 100 UNIT/ML pen (Discontinued)   Rationale: Dose too low - Dosage too low - Effectiveness   Recommendation: Increase Dose - Lantus SoloStar 100 UNIT/ML soln   Status: Accepted per CPA

## 2024-10-17 LAB
ATRIAL RATE - MUSE: 72 BPM
DIASTOLIC BLOOD PRESSURE - MUSE: NORMAL MMHG
INTERPRETATION ECG - MUSE: NORMAL
P AXIS - MUSE: 8 DEGREES
PR INTERVAL - MUSE: 158 MS
QRS DURATION - MUSE: 86 MS
QT - MUSE: 396 MS
QTC - MUSE: 433 MS
R AXIS - MUSE: 33 DEGREES
SYSTOLIC BLOOD PRESSURE - MUSE: NORMAL MMHG
T AXIS - MUSE: 51 DEGREES
VENTRICULAR RATE- MUSE: 72 BPM

## 2024-10-21 ENCOUNTER — VIRTUAL VISIT (OUTPATIENT)
Dept: PALLIATIVE CARE | Facility: CLINIC | Age: 44
End: 2024-10-21
Attending: FAMILY MEDICINE
Payer: COMMERCIAL

## 2024-10-21 DIAGNOSIS — G89.3 CANCER ASSOCIATED PAIN: ICD-10-CM

## 2024-10-21 DIAGNOSIS — Z51.5 PALLIATIVE CARE PATIENT: Primary | ICD-10-CM

## 2024-10-21 DIAGNOSIS — C79.31 MALIGNANT NEOPLASM METASTATIC TO BRAIN (H): ICD-10-CM

## 2024-10-21 DIAGNOSIS — M79.10 MUSCLE SORENESS: ICD-10-CM

## 2024-10-21 DIAGNOSIS — C34.90 NON-SMALL CELL LUNG CANCER, UNSPECIFIED LATERALITY (H): ICD-10-CM

## 2024-10-21 PROCEDURE — 99215 OFFICE O/P EST HI 40 MIN: CPT | Mod: 95 | Performed by: FAMILY MEDICINE

## 2024-10-21 PROCEDURE — G2211 COMPLEX E/M VISIT ADD ON: HCPCS | Mod: 95 | Performed by: FAMILY MEDICINE

## 2024-10-21 NOTE — PROGRESS NOTES
Virtual Visit Details    Type of service:  Video Visit   Video Start Time: 11:21 AM  Video End Time:11:54 AM    Originating Location (pt. Location): Other work    Distant Location (provider location):  On-site  Platform used for Video Visit: [facility]    Palliative Care Outpatient Clinic Progress Note    Patient Name: Connor Emerson  Primary Provider: Jessica Mohr    Impression & Recommendations & Counseling:  Connor Emerson is a 44 year old male with history of NSCLC with ALK rearrangement and mets to the brain s/p gamma knife treatments and craniotomy open excision and currently on a second line TKI. He is experiencing 'stiffness' from the TKI and this is better overall and worse when he works. He has a lot of cancer related fatigue.  ECO  Decisional Capacity: very present     PDMP review:  Yes, no concerns     Metastatic NSCLC with ALK rearrangement  S/p GK to brain mets and craniotomy for excision  Cancer associated pain  Fibromyalgia-like somatic soreness--on duloxetine, flexeril and prozac  HTN  Headaches--overall better and Connor prefers to minimize use of steroids, if at all possible.  Cancer associated fatigue     Goals of Care:  10/21/2024   No change to GOC.  2024  Connor feels like he is living on borrowed time--he was once told he had to live until about 2023 and he worries this may be his last summer and he doesn't want to spend it recovering from surgery or feeling poorly.  So he is going to do a PET scan and if it is negative he for sure would not want surgery.  He is swayed by surgeons feeling it is scar tissue from his previous chest tube.  If there is recurrent tumor Connor would consider radiation therapy and he is also thinking it might be time to put his focus on a comfortable death.  He seems to have a very sound decision making framework and he's just missing some data at this time.  2023  no change in GOC  3/1/2023 Connor wants to continue cancer-directed therapies as long  as the side effects are tolerable.  He is a FULL code should he have a cardiac arrest. He is OK being cared for in the acute care hospital if needed.     Recommendations & Counseling:  Continue cancer care per Dr. Persaud and his team  CONTINUE Methadone 7.5 MG PO bid (He will clarify with his wife what his dose is--PDMP dispensing shows he may on be on 5 mg po BID)  Continue oxycodone 5-10 mg po q 4 hours prn;  Continue to hold Ambien   Continue Duloxetine and cyclobenzaprine and Prozac  Narcan nasal spray rx also sent to pharmacy last visit  List of community counseling resources provided in the AVS.  List of sleep hygiene ideas provided in AVS     CONTINUE  Ritalin 5-10 mg po when first rising and at noon, if needed.      CONTINUE flexeril 5 mg at HS to see if it helps morning stiffness;   UTD and [facility] pharmacy resources did not indicate a drug-drug interaction with methadone.     I added a CK order to next blood draw    F/up in 8 weeks and sooner prn.          Counseling: All of the above was explained to the patient in lay language. The patient has verbalized a clear understanding of the discussion, asked appropriate questions, which have been answered to patient's apparent satisfaction. The patient is in agreement with the above plan.        Chief Complaint/Patient ID: Connor Emerson 44 year old male with PMHx of  NSCLC with ALK rearrangement and mets to the brain s/p gamma knife treatments and craniotomy open excision and currently on a second line TKI. He is experiencing 'stiffness' from the TKI that has improved with scheduled flexeril at HS and he switched to lorlatinib in early July 2024    Last Palliative care appointment: 08/01/2024 with me     Reviewed:  Yes:   reviewed - controlled substances reflected in medication list.    Interim History:  Connor Emerson is a 44 year old male who is seen today for follow up with Palliative Care via billable video visit. He is tolerating lorlatanib.  His pain  "seems well controlled but he feels 'sore.'  It feels like every discrete muscle in his body is sore.  It is progressively worsening and he is applying for disability due to increased trouble working. He is so achey it feels like 'fatigue.'      Pain:  well controlled with methadone and prn oxycodone; we had some confusion about whether he is on methadone 5 mg po BI\"d or 7.5 mg BID and per PDMP he may not have picked it up for a couple of months.    Appetite/Nausea: OK for the most part     Bowels: no constipation     Sleep: mostly good with some vivid dreads     Mood: frustrated with soreness    Fatigue:  OK with Ritalin--doesn't feel like he's '100%, but he is definitely more awake' than without it.     Coping:      Family History- Reviewed in Epic.    Allergies   Allergen Reactions    Vicodin [Hydrocodone-Acetaminophen] Nausea and Vomiting and GI Disturbance       Social History:  Pertinent changes to social history/social situation since last visit: applying for SSDI; daughter has not had her major back surgery yet  Key support resources: wife and family  Advance Directive Status:  no ACP documents in epic.    Social History     Tobacco Use    Smoking status: Never     Passive exposure: Never    Smokeless tobacco: Never   Vaping Use    Vaping status: Never Used   Substance Use Topics    Alcohol use: Yes     Alcohol/week: 0.0 standard drinks of alcohol     Comment: social    Drug use: No         Allergies   Allergen Reactions    Vicodin [Hydrocodone-Acetaminophen] Nausea and Vomiting and GI Disturbance     Current Outpatient Medications   Medication Sig Dispense Refill    acetaminophen (TYLENOL) 325 MG tablet Take 325-650 mg by mouth every 6 hours as needed for mild pain      albuterol (PROAIR HFA/PROVENTIL HFA/VENTOLIN HFA) 108 (90 Base) MCG/ACT inhaler Inhale 2 puffs into the lungs every 6 hours as needed for shortness of breath, wheezing or cough (Patient not taking: Reported on 10/16/2024) 18 g 0    Alcohol " Swabs PADS Use to swab the area of the injection or jabier as directed. Per insurance coverage 100 each 0    amLODIPine (NORVASC) 10 MG tablet Take 1 tablet (10 mg) by mouth daily. 90 tablet 0    blood glucose (NO BRAND SPECIFIED) lancets standard To use to test glucose level in the blood Use to test blood sugar  4  times daily as directed. To accompany glucose monitor brands per insurance coverage. 100 each 3    blood glucose (NO BRAND SPECIFIED) test strip To use to test glucose level in the blood Use to test blood sugar  4 times daily as directed. To accompany glucose monitor brands per insurance coverage. 100 strip 3    Blood Glucose Monitoring Suppl (ACCU-CHEK GUIDE) w/Device KIT       Continuous Glucose Sensor (FREESTYLE IRENE 3 SENSOR) MISC 1 each every 14 days. Use 1 sensor every 14 days. Use to read blood sugars per 's instructions. 6 each 3    cyclobenzaprine (FLEXERIL) 5 MG tablet Take 2 tablets (10 mg) by mouth nightly as needed for muscle spasms 30 tablet 4    DULoxetine (CYMBALTA) 30 MG capsule Take 1 capsule (30 mg) by mouth 2 times daily. 60 capsule 2    empagliflozin (JARDIANCE) 25 MG TABS tablet Take 1 tablet (25 mg) by mouth daily 90 tablet 1    FLUoxetine (PROZAC) 20 MG capsule Take 1 capsule (20 mg) by mouth daily 30 capsule 1    insulin aspart (NOVOLOG PEN) 100 UNIT/ML pen Inject 0-40 Units Subcutaneous 3 times daily (before meals) Per discharge instructions sliding scale (Patient not taking: Reported on 8/5/2024) 45 mL 2    insulin glargine (LANTUS PEN) 100 UNIT/ML pen Inject 80 Units subcutaneously every morning. 30 mL 1    insulin pen needle (32G X 4 MM) 32G X 4 MM miscellaneous Use as directed by provider. Per insurance coverage 100 each 0    levETIRAcetam (KEPPRA) 1000 MG tablet Take 1 tablet (1,000 mg) by mouth 2 times daily 60 tablet 11    lisinopril (ZESTRIL) 40 MG tablet Take 1 tablet (40 mg) by mouth daily 30 tablet 1    lorlatinib (LORBRENA) 100 MG Take 1 tablet (100 mg)  by mouth daily 30 tablet 0    metFORMIN (GLUCOPHAGE) 500 MG tablet Take 1 tablet (500 mg) by mouth 2 times daily (with meals) 60 tablet 3    methadone (DOLOPHINE) 5 MG tablet Take 1 tablet (5 mg) by mouth 2 times daily. 60 tablet 0    methylphenidate (RITALIN) 5 MG tablet Take 1-2 tablets (5-10 mg) by mouth 2 times daily as needed (fatigue) Take second dose by 12 noon, if it is needed 90 tablet 0    naloxone (NARCAN) 4 MG/0.1ML nasal spray Spray 1 spray (4 mg) into one nostril alternating nostrils as needed for opioid reversal every 2-3 minutes until assistance arrives (Patient not taking: Reported on 9/4/2024) 0.2 mL 3    oxyCODONE IR (ROXICODONE) 10 MG tablet Take 1 tablet (10 mg) by mouth every 3 hours as needed for moderate pain. 120 tablet 0    prochlorperazine (COMPAZINE) 10 MG tablet Take 1 tablet (10 mg) by mouth every 6 hours as needed for nausea or vomiting (Patient not taking: Reported on 8/5/2024) 30 tablet 2    propranolol (INDERAL) 20 MG tablet Take 1 tablet (20 mg) by mouth 2 times daily 60 tablet 1    rivaroxaban ANTICOAGULANT (XARELTO) 20 MG TABS tablet Take 1 tablet (20 mg) by mouth daily (with dinner) 90 tablet 3    rosuvastatin (CRESTOR) 10 MG tablet Take 1 tablet (10 mg) by mouth daily. 90 tablet 2    semaglutide (OZEMPIC) 2 MG/3ML pen Inject 0.25 mg subcutaneously every 7 days. For 4 weeks then increase to 0.5 mg once weekly if tolerating 3 mL 0     Past Medical History:   Diagnosis Date    Atypical chest pain 12/02/2013    Cancer (H)     Complication of anesthesia     Diabetes (H)     GERD (gastroesophageal reflux disease) 12/02/2013    History of pulmonary embolism     HTN (hypertension) 05/14/2012    HTN, goal below 140/90 07/02/2013    Insomnia 02/21/2012    Mediastinal lymphadenopathy     Metastasis to brain (H) 2022    Migraine headache 07/02/2013    Migraine with aura, without mention of intractable migraine without mention of status migrainosus     Pneumonia      Past Surgical History:    Procedure Laterality Date    BRONCHOSCOPY RIGID OR FLEXIBLE W/TRANSENDOSCOPIC ENDOBRONCHIAL ULTRASOUND GUIDED Bilateral 1/26/2022    Procedure: Right BRONCHOSCOPY, FIBEROPTIC, endobronchial ultrasound, pleural biopsy;  Surgeon: Dallin Agrawal MD;  Location: [facility] OR    INJECT BLOCK MEDIAL BRANCH CERVICAL/THORACIC/LUMBAR      INSERT CHEST TUBE Right 2/16/2022    Procedure: INSERTION, CATHETER, INTERCOSTAL, FOR DRAINAGE;  Surgeon: Dallin Agrawal MD;  Location: [facility] GI    INSERT CHEST TUBE Right 3/9/2022    Procedure: INSERTION, CATHETER, INTERCOSTAL, FOR DRAINAGE;  Surgeon: Sushila Antonio MD;  Location: [facility] GI    IR CHEST TUBE REMOVAL TUNNELED RIGHT  4/2/2022    OPTICAL TRACKING SYSTEM CRANIOTOMY, EXCISE TUMOR, COMBINED Left 6/28/2022    Procedure: Left stealth craniotomy for tumor resection with motor mapping;  Surgeon: Stephen Moran MD;  Location:  OR    OPTICAL TRACKING SYSTEM CRANIOTOMY, EXCISE TUMOR, COMBINED Right 6/27/2023    Procedure: Right stealth craniotomy and resection of tumor;  Surgeon: Stephen Moran MD;  Location:  OR    ORTHOPEDIC SURGERY      Ganesh. Rotator cuff repair.    PLEUROSCOPY N/A 1/26/2022    Procedure: Pleuroscopy with Pleural Biopsy;  Surgeon: Dallin Agrawal MD;  Location: [facility] OR       Physical Exam:   GENERAL APPEARANCE: tired, robust appearing, alert and no distress; neatly groomed  EYES: Eyes grossly normal to inspection, PERRLA, conjunctivae and sclerae without injection or discharge, EOM intact   RESP:  no increased work of breathing; speaks in complete sentences;   MS: No musculoskeletal defects are noted  SKIN: No suspicious lesions or rashes, hydration status appears adequate with normal skin turgor   PSYCH: Alert and oriented x3; speech- coherent , normal rate and volume; able to articulate logical thoughts, able to abstract reason, no tangential thoughts, no hallucinations or delusions, mentation appears normal, Mood is euthymic. Affect is appropriate for this  mood state and bright. Thought content is free of suicidal ideation, hallucinations, and delusions.  Eye contact is good during conversation.       Key Data Reviewed:  LABS: 10/4/2024- Cr 0.75, Albumin 4.5,  Hgb 14.5, A1c 11.5;       IMAGIN2024 MR BRAIN PERFUSIOIN WO & W/CONTRAST  IMPRESSION:     1. Overall positive treatment response with resolution of several  metastases.  2. Two metastases (right parietal, left cerebellar) appears slightly  enlarged.  3. Increased vasogenic edema associated with right temporal and left  cerebellar metastases.       40 minutes spent on the date of the encounter doing chart review, history and exam, patient education & counseling, documentation and other activities as noted above.    The longitudinal plan of care for the diagnosis(es)/condition(s) as documented were addressed during this visit. Due to the added complexity in care, I will continue to support Connor in the subsequent management and with ongoing continuity of care.    Ajith Brewer MD MS FAAFP CAQHPM  [facility]  Office [phone]  Fax [phone]

## 2024-10-21 NOTE — LETTER
10/21/2024       RE: Connor Emerson  [address]  [facility] [address]     Dear Colleague,    Thank you for referring your patient, Connor Emerson[facility]. Please see a copy of my visit note below.    Virtual Visit Details    Type of service:  Video Visit   Video Start Time: 11:21 AM  Video End Time:11:54 AM    Originating Location (pt. Location): Other work    Distant Location (provider location):  On-site  Platform used for Video Visit: [facility]    Palliative Care Outpatient Clinic Progress Note    Patient Name: Connor Emerson  Primary Provider: Jessica Mohr    Impression & Recommendations & Counseling:  Connor Emerson is a 44 year old male with history of NSCLC with ALK rearrangement and mets to the brain s/p gamma knife treatments and craniotomy open excision and currently on a second line TKI. He is experiencing 'stiffness' from the TKI and this is better overall and worse when he works. He has a lot of cancer related fatigue.  ECO  Decisional Capacity: very present     PDMP review:  Yes, no concerns     Metastatic NSCLC with ALK rearrangement  S/p GK to brain mets and craniotomy for excision  Cancer associated pain  Fibromyalgia-like somatic soreness--on duloxetine, flexeril and prozac  HTN  Headaches--overall better and Connor prefers to minimize use of steroids, if at all possible.  Cancer associated fatigue     Goals of Care:  10/21/2024   No change to GOC.  2024  Connor feels like he is living on borrowed time--he was once told he had to live until about 2023 and he worries this may be his last summer and he doesn't want to spend it recovering from surgery or feeling poorly.  So he is going to do a PET scan and if it is negative he for sure would not want surgery.  He is swayed by surgeons feeling it is scar tissue from his previous chest tube.  If there is recurrent tumor Connor would  consider radiation therapy and he is also thinking it might be time to put his focus on a comfortable death.  He seems to have a very sound decision making framework and he's just missing some data at this time.  12/19/2023  no change in GOC  3/1/2023 Connor wants to continue cancer-directed therapies as long as the side effects are tolerable.  He is a FULL code should he have a cardiac arrest. He is OK being cared for in the acute care hospital if needed.     Recommendations & Counseling:  Continue cancer care per Dr. Persaud and his team  CONTINUE Methadone 7.5 MG PO bid (He will clarify with his wife what his dose is--PDMP dispensing shows he may on be on 5 mg po BID)  Continue oxycodone 5-10 mg po q 4 hours prn;  Continue to hold Ambien   Continue Duloxetine and cyclobenzaprine and Prozac  Narcan nasal spray rx also sent to pharmacy last visit  List of community counseling resources provided in the AVS.  List of sleep hygiene ideas provided in AVS     CONTINUE  Ritalin 5-10 mg po when first rising and at noon, if needed.      CONTINUE flexeril 5 mg at HS to see if it helps morning stiffness;   [facility] and [facility] pharmacy resources did not indicate a drug-drug interaction with methadone.     I added a CK order to next blood draw    F/up in 8 weeks and sooner prn.          Counseling: All of the above was explained to the patient in lay language. The patient has verbalized a clear understanding of the discussion, asked appropriate questions, which have been answered to patient's apparent satisfaction. The patient is in agreement with the above plan.        Chief Complaint/Patient ID: Connor Emerson 44 year old male with PMHx of  NSCLC with ALK rearrangement and mets to the brain s/p gamma knife treatments and craniotomy open excision and currently on a second line TKI. He is experiencing 'stiffness' from the TKI that has improved with scheduled flexeril at HS and he switched to lorlatinib in early July 2024    Last  "Palliative care appointment: 08/01/2024 with me     Reviewed:  Yes:   reviewed - controlled substances reflected in medication list.    Interim History:  Connor Emerson is a 44 year old male who is seen today for follow up with Palliative Care via billable video visit. He is tolerating lorlatanib.  His pain seems well controlled but he feels 'sore.'  It feels like every discrete muscle in his body is sore.  It is progressively worsening and he is applying for disability due to increased trouble working. He is so achey it feels like 'fatigue.'      Pain:  well controlled with methadone and prn oxycodone; we had some confusion about whether he is on methadone 5 mg po BI\"d or 7.5 mg BID and per PDMP he may not have picked it up for a couple of months.    Appetite/Nausea: OK for the most part     Bowels: no constipation     Sleep: mostly good with some vivid dreads     Mood: frustrated with soreness    Fatigue:  OK with Ritalin--doesn't feel like he's '100%, but he is definitely more awake' than without it.     Coping:      Family History- Reviewed in Epic.    Allergies   Allergen Reactions     Vicodin [Hydrocodone-Acetaminophen] Nausea and Vomiting and GI Disturbance       Social History:  Pertinent changes to social history/social situation since last visit: applying for SSDI; daughter has not had her major back surgery yet  Key support resources: wife and family  Advance Directive Status:  no ACP documents in epic.    Social History     Tobacco Use     Smoking status: Never     Passive exposure: Never     Smokeless tobacco: Never   Vaping Use     Vaping status: Never Used   Substance Use Topics     Alcohol use: Yes     Alcohol/week: 0.0 standard drinks of alcohol     Comment: social     Drug use: No         Allergies   Allergen Reactions     Vicodin [Hydrocodone-Acetaminophen] Nausea and Vomiting and GI Disturbance     Current Outpatient Medications   Medication Sig Dispense Refill     acetaminophen (TYLENOL) " 325 MG tablet Take 325-650 mg by mouth every 6 hours as needed for mild pain       albuterol (PROAIR HFA/PROVENTIL HFA/VENTOLIN HFA) 108 (90 Base) MCG/ACT inhaler Inhale 2 puffs into the lungs every 6 hours as needed for shortness of breath, wheezing or cough (Patient not taking: Reported on 10/16/2024) 18 g 0     Alcohol Swabs PADS Use to swab the area of the injection or jabier as directed. Per insurance coverage 100 each 0     amLODIPine (NORVASC) 10 MG tablet Take 1 tablet (10 mg) by mouth daily. 90 tablet 0     blood glucose (NO BRAND SPECIFIED) lancets standard To use to test glucose level in the blood Use to test blood sugar  4  times daily as directed. To accompany glucose monitor brands per insurance coverage. 100 each 3     blood glucose (NO BRAND SPECIFIED) test strip To use to test glucose level in the blood Use to test blood sugar  4 times daily as directed. To accompany glucose monitor brands per insurance coverage. 100 strip 3     Blood Glucose Monitoring Suppl (ACCU-CHEK GUIDE) w/Device KIT        Continuous Glucose Sensor (FREESTYLE IRENE 3 SENSOR) [facility] 1 each every 14 days. Use 1 sensor every 14 days. Use to read blood sugars per 's instructions. 6 each 3     cyclobenzaprine (FLEXERIL) 5 MG tablet Take 2 tablets (10 mg) by mouth nightly as needed for muscle spasms 30 tablet 4     DULoxetine (CYMBALTA) 30 MG capsule Take 1 capsule (30 mg) by mouth 2 times daily. 60 capsule 2     empagliflozin (JARDIANCE) 25 MG TABS tablet Take 1 tablet (25 mg) by mouth daily 90 tablet 1     FLUoxetine (PROZAC) 20 MG capsule Take 1 capsule (20 mg) by mouth daily 30 capsule 1     insulin aspart (NOVOLOG PEN) 100 UNIT/ML pen Inject 0-40 Units Subcutaneous 3 times daily (before meals) Per discharge instructions sliding scale (Patient not taking: Reported on 8/5/2024) 45 mL 2     insulin glargine (LANTUS PEN) 100 UNIT/ML pen Inject 80 Units subcutaneously every morning. 30 mL 1     insulin pen needle (32G X 4  MM) 32G X 4 MM miscellaneous Use as directed by provider. Per insurance coverage 100 each 0     levETIRAcetam (KEPPRA) 1000 MG tablet Take 1 tablet (1,000 mg) by mouth 2 times daily 60 tablet 11     lisinopril (ZESTRIL) 40 MG tablet Take 1 tablet (40 mg) by mouth daily 30 tablet 1     lorlatinib (LORBRENA) 100 MG Take 1 tablet (100 mg) by mouth daily 30 tablet 0     metFORMIN (GLUCOPHAGE) 500 MG tablet Take 1 tablet (500 mg) by mouth 2 times daily (with meals) 60 tablet 3     methadone (DOLOPHINE) 5 MG tablet Take 1 tablet (5 mg) by mouth 2 times daily. 60 tablet 0     methylphenidate (RITALIN) 5 MG tablet Take 1-2 tablets (5-10 mg) by mouth 2 times daily as needed (fatigue) Take second dose by 12 noon, if it is needed 90 tablet 0     naloxone (NARCAN) 4 MG/0.1ML nasal spray Spray 1 spray (4 mg) into one nostril alternating nostrils as needed for opioid reversal every 2-3 minutes until assistance arrives (Patient not taking: Reported on 9/4/2024) 0.2 mL 3     oxyCODONE IR (ROXICODONE) 10 MG tablet Take 1 tablet (10 mg) by mouth every 3 hours as needed for moderate pain. 120 tablet 0     prochlorperazine (COMPAZINE) 10 MG tablet Take 1 tablet (10 mg) by mouth every 6 hours as needed for nausea or vomiting (Patient not taking: Reported on 8/5/2024) 30 tablet 2     propranolol (INDERAL) 20 MG tablet Take 1 tablet (20 mg) by mouth 2 times daily 60 tablet 1     rivaroxaban ANTICOAGULANT (XARELTO) 20 MG TABS tablet Take 1 tablet (20 mg) by mouth daily (with dinner) 90 tablet 3     rosuvastatin (CRESTOR) 10 MG tablet Take 1 tablet (10 mg) by mouth daily. 90 tablet 2     semaglutide (OZEMPIC) 2 MG/3ML pen Inject 0.25 mg subcutaneously every 7 days. For 4 weeks then increase to 0.5 mg once weekly if tolerating 3 mL 0     Past Medical History:   Diagnosis Date     Atypical chest pain 12/02/2013     Cancer (H)      Complication of anesthesia      Diabetes (H)      GERD (gastroesophageal reflux disease) 12/02/2013     History  of pulmonary embolism      HTN (hypertension) 05/14/2012     HTN, goal below 140/90 07/02/2013     Insomnia 02/21/2012     Mediastinal lymphadenopathy      Metastasis to brain (H) 2022     Migraine headache 07/02/2013     Migraine with aura, without mention of intractable migraine without mention of status migrainosus      Pneumonia      Past Surgical History:   Procedure Laterality Date     BRONCHOSCOPY RIGID OR FLEXIBLE W/TRANSENDOSCOPIC ENDOBRONCHIAL ULTRASOUND GUIDED Bilateral 1/26/2022    Procedure: Right BRONCHOSCOPY, FIBEROPTIC, endobronchial ultrasound, pleural biopsy;  Surgeon: Dallin Agrawal MD;  Location: [facility] OR     INJECT BLOCK MEDIAL BRANCH CERVICAL/THORACIC/LUMBAR       INSERT CHEST TUBE Right 2/16/2022    Procedure: INSERTION, CATHETER, INTERCOSTAL, FOR DRAINAGE;  Surgeon: Dallin Agrawal MD;  Location: [facility] GI     INSERT CHEST TUBE Right 3/9/2022    Procedure: INSERTION, CATHETER, INTERCOSTAL, FOR DRAINAGE;  Surgeon: Sushila Antonio MD;  Location: [facility] GI     IR CHEST TUBE REMOVAL TUNNELED RIGHT  4/2/2022     OPTICAL TRACKING SYSTEM CRANIOTOMY, EXCISE TUMOR, COMBINED Left 6/28/2022    Procedure: Left stealth craniotomy for tumor resection with motor mapping;  Surgeon: Stephen Moran MD;  Location:  OR     OPTICAL TRACKING SYSTEM CRANIOTOMY, EXCISE TUMOR, COMBINED Right 6/27/2023    Procedure: Right stealth craniotomy and resection of tumor;  Surgeon: Stephen Moran MD;  Location:  OR     ORTHOPEDIC SURGERY      Ganesh. Rotator cuff repair.     PLEUROSCOPY N/A 1/26/2022    Procedure: Pleuroscopy with Pleural Biopsy;  Surgeon: Dallin Agrawal MD;  Location: [facility] OR       Physical Exam:   GENERAL APPEARANCE: tired, robust appearing, alert and no distress; neatly groomed  EYES: Eyes grossly normal to inspection, PERRLA, conjunctivae and sclerae without injection or discharge, EOM intact   RESP:  no increased work of breathing; speaks in complete sentences;   MS: No musculoskeletal defects are  noted  SKIN: No suspicious lesions or rashes, hydration status appears adequate with normal skin turgor   PSYCH: Alert and oriented x3; speech- coherent , normal rate and volume; able to articulate logical thoughts, able to abstract reason, no tangential thoughts, no hallucinations or delusions, mentation appears normal, Mood is euthymic. Affect is appropriate for this mood state and bright. Thought content is free of suicidal ideation, hallucinations, and delusions.  Eye contact is good during conversation.       Key Data Reviewed:  LABS: 10/4/2024- Cr 0.75, Albumin 4.5,  Hgb 14.5, A1c 11.5;       IMAGIN2024 MR BRAIN PERFUSIOIN WO & W/CONTRAST  IMPRESSION:     1. Overall positive treatment response with resolution of several  metastases.  2. Two metastases (right parietal, left cerebellar) appears slightly  enlarged.  3. Increased vasogenic edema associated with right temporal and left  cerebellar metastases.       40 minutes spent on the date of the encounter doing chart review, history and exam, patient education & counseling, documentation and other activities as noted above.    The longitudinal plan of care for the diagnosis(es)/condition(s) as documented were addressed during this visit. Due to the added complexity in care, I will continue to support Connor in the subsequent management and with ongoing continuity of care.    Ajith Brewer MD MS FAAFP CAQHPM  [facility]  Office [phone]  Fax [phone]       Again, thank you for allowing me to participate in the care of your patient.      Sincerely,    Ajith Brewer MD

## 2024-10-21 NOTE — NURSING NOTE
Current patient location: [address] [place] [address]   [facility] [address]    Is the patient currently in the state of [place]? YES    Visit mode:VIDEO    If the visit is dropped, the patient can be reconnected by: VIDEO VISIT: Text to cell phone:   Telephone Information:   Mobile [phone]       Will anyone else be joining the visit? NO  (If patient encounters technical issues they should call [phone]    Are changes needed to the allergy or medication list? No    Are refills needed on medications prescribed by this physician? NO    Rooming Documentation:  Not applicable    Reason for visit: RECHECK    Herber OCONNOR

## 2024-10-21 NOTE — PATIENT INSTRUCTIONS
Detail Level: Zone
It was good to see you today, Connor.    Here are the things we talked about:  Confirm with Kylie how much methadone you're using and let us know.  I ordered two extra blood tests for your next draw:  CK-measure of muscle inflammation and ESR-a measure of general inflammation in your body. If either of these are elevated I'll confer with Dr. Persaud about a burst of steroids.  I will research if I can increase the duloxetine dose safely.  Continue your current medications    Someone from the team will reach out to schedule a follow up appointment in 2 months and call sooner for more problems.     How to get a hold of us:  For non-urgent matters, MyChart works best.    For more urgent matters, or if you prefer not to use MyChart, call our clinic nurse coordinator Reyna Ma RN at [phone]    We have an on-call number for evenings and weekends. Please call this only if you are having uncontrolled symptoms or serious side effects from your medicines: [phone].     For refills, please give us a week (5 working days) notice. We don't always have providers available everyday to do refills. If you call the day you run out of your medicine, we may not be able to refill it in time, so call 5 days in advance!    Ajith Brewer MD MS FAAFP CAQHPM  [facility]  Office [phone]  Fax [phone]   
No

## 2024-10-28 ENCOUNTER — MYC REFILL (OUTPATIENT)
Dept: RADIATION ONCOLOGY | Facility: CLINIC | Age: 44
End: 2024-10-28
Payer: COMMERCIAL

## 2024-10-28 ENCOUNTER — MYC REFILL (OUTPATIENT)
Dept: PALLIATIVE CARE | Facility: CLINIC | Age: 44
End: 2024-10-28
Payer: COMMERCIAL

## 2024-10-28 DIAGNOSIS — D49.6 BRAIN TUMOR (H): ICD-10-CM

## 2024-10-28 DIAGNOSIS — C79.31 MALIGNANT NEOPLASM METASTATIC TO BRAIN (H): ICD-10-CM

## 2024-10-28 DIAGNOSIS — C34.90 NON-SMALL CELL LUNG CANCER, UNSPECIFIED LATERALITY (H): ICD-10-CM

## 2024-10-28 DIAGNOSIS — Z98.890 S/P CRANIOTOMY: ICD-10-CM

## 2024-10-28 DIAGNOSIS — R53.0 NEOPLASTIC MALIGNANT RELATED FATIGUE: ICD-10-CM

## 2024-10-28 RX ORDER — OXYCODONE HYDROCHLORIDE 10 MG/1
10 TABLET ORAL
Qty: 120 TABLET | Refills: 0 | Status: SHIPPED | OUTPATIENT
Start: 2024-11-02 | End: 2024-11-13

## 2024-10-28 RX ORDER — METHYLPHENIDATE HYDROCHLORIDE 5 MG/1
5-10 TABLET ORAL 2 TIMES DAILY PRN
Qty: 90 TABLET | Refills: 0 | Status: SHIPPED | OUTPATIENT
Start: 2024-10-28

## 2024-10-28 NOTE — TELEPHONE ENCOUNTER
Received [employer] message from patient requesting refill of methylphenidate.    Last refill: 8/30/24  Last office visit: 10/21/24  Scheduled for follow up per check out request.     Will route request to MD/ for review.     [place]ed [place].

## 2024-10-28 NOTE — TELEPHONE ENCOUNTER
Received [employer] message from patient requesting refill of oxycodone.     Last refill: 10/19/24  Last office visit: 10/21/24  Scheduled for follow up per check out request.     Will route request to MD/ for review.     Reviewed [place]  Report.

## 2024-11-03 ENCOUNTER — MYC REFILL (OUTPATIENT)
Dept: PHARMACY | Facility: CLINIC | Age: 44
End: 2024-11-03
Payer: COMMERCIAL

## 2024-11-03 DIAGNOSIS — E11.9 TYPE 2 DIABETES MELLITUS WITHOUT COMPLICATION, WITH LONG-TERM CURRENT USE OF INSULIN (H): ICD-10-CM

## 2024-11-03 DIAGNOSIS — Z79.4 TYPE 2 DIABETES MELLITUS WITHOUT COMPLICATION, WITH LONG-TERM CURRENT USE OF INSULIN (H): ICD-10-CM

## 2024-11-04 DIAGNOSIS — I67.89 RADIATION THERAPY INDUCED BRAIN NECROSIS: ICD-10-CM
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
## 2024-11-04 DIAGNOSIS — C34.31 MALIGNANT NEOPLASM OF LOWER LOBE OF RIGHT LUNG (H): Primary | ICD-10-CM

## 2024-11-04 RX ORDER — ACYCLOVIR 800 MG/1
1 TABLET ORAL
Qty: 6 EACH | Refills: 3 | Status: SHIPPED | OUTPATIENT
Start: 2024-11-04

## 2024-11-05 ENCOUNTER — PATIENT OUTREACH (OUTPATIENT)
Dept: ONCOLOGY | Facility: CLINIC | Age: 44
End: 2024-11-05
Payer: COMMERCIAL

## 2024-11-05 NOTE — PROGRESS NOTES
Pt called asking for clarification about Dr Persaud's departure. Explained we will recommend a new provider on the team and facilitate appointments. Answered all Pt questions.     Pt also nervous about having the checkout orders form his October visit completed. Told Pt I would look into this and have them completed shortly.     Pt verbalized understanding.

## 2024-11-06 ENCOUNTER — LAB (OUTPATIENT)
Dept: LAB | Facility: CLINIC | Age: 44
End: 2024-11-06
Payer: COMMERCIAL

## 2024-11-06 DIAGNOSIS — M79.10 MUSCLE SORENESS: ICD-10-CM

## 2024-11-06 DIAGNOSIS — G89.3 CANCER ASSOCIATED PAIN: ICD-10-CM

## 2024-11-06 DIAGNOSIS — C34.90 PRIMARY MALIGNANT NEOPLASM OF LUNG METASTATIC TO OTHER SITE, UNSPECIFIED LATERALITY (H): ICD-10-CM

## 2024-11-06 DIAGNOSIS — Z79.899 ENCOUNTER FOR LONG-TERM (CURRENT) USE OF MEDICATIONS: ICD-10-CM

## 2024-11-06 DIAGNOSIS — C34.90 NON-SMALL CELL LUNG CANCER, UNSPECIFIED LATERALITY (H): ICD-10-CM

## 2024-11-06 LAB
ALBUMIN SERPL BCG-MCNC: 4.4 G/DL (ref 3.5–5.2)
ALP SERPL-CCNC: 118 U/L (ref 40–150)
ALT SERPL W P-5'-P-CCNC: 19 U/L (ref 0–70)
ANION GAP SERPL CALCULATED.3IONS-SCNC: 13 MMOL/L (ref 7–15)
AST SERPL W P-5'-P-CCNC: 13 U/L (ref 0–45)
BASOPHILS # BLD AUTO: 0 10E3/UL (ref 0–0.2)
BASOPHILS NFR BLD AUTO: 0 %
BILIRUB SERPL-MCNC: 0.2 MG/DL
BUN SERPL-MCNC: 17 MG/DL (ref 6–20)
CALCIUM SERPL-MCNC: 9.4 MG/DL (ref 8.8–10.4)
CHLORIDE SERPL-SCNC: 103 MMOL/L (ref 98–107)
CK SERPL-CCNC: 68 U/L (ref 39–308)
CREAT SERPL-MCNC: 0.67 MG/DL (ref 0.67–1.17)
EGFRCR SERPLBLD CKD-EPI 2021: >90 ML/MIN/1.73M2
EOSINOPHIL # BLD AUTO: 0.3 10E3/UL (ref 0–0.7)
EOSINOPHIL NFR BLD AUTO: 4 %
ERYTHROCYTE [DISTWIDTH] IN BLOOD BY AUTOMATED COUNT: 12.9 % (ref 10–15)
ERYTHROCYTE [SEDIMENTATION RATE] IN BLOOD BY WESTERGREN METHOD: 14 MM/HR (ref 0–15)
GLUCOSE SERPL-MCNC: 451 MG/DL (ref 70–99)
HCO3 SERPL-SCNC: 25 MMOL/L (ref 22–29)
HCT VFR BLD AUTO: 44.1 % (ref 40–53)
HGB BLD-MCNC: 15.1 G/DL (ref 13.3–17.7)
IMM GRANULOCYTES # BLD: 0 10E3/UL
IMM GRANULOCYTES NFR BLD: 0 %
LYMPHOCYTES # BLD AUTO: 1.6 10E3/UL (ref 0.8–5.3)
LYMPHOCYTES NFR BLD AUTO: 22 %
MAGNESIUM SERPL-MCNC: 2 MG/DL (ref 1.7–2.3)
MCH RBC QN AUTO: 30.7 PG (ref 26.5–33)
MCHC RBC AUTO-ENTMCNC: 34.2 G/DL (ref 31.5–36.5)
MCV RBC AUTO: 90 FL (ref 78–100)
MONOCYTES # BLD AUTO: 0.6 10E3/UL (ref 0–1.3)
MONOCYTES NFR BLD AUTO: 8 %
NEUTROPHILS # BLD AUTO: 5 10E3/UL (ref 1.6–8.3)
NEUTROPHILS NFR BLD AUTO: 66 %
PHOSPHATE SERPL-MCNC: 2.1 MG/DL (ref 2.5–4.5)
PLATELET # BLD AUTO: 171 10E3/UL (ref 150–450)
POTASSIUM SERPL-SCNC: 3.8 MMOL/L (ref 3.4–5.3)
PROT SERPL-MCNC: 6.8 G/DL (ref 6.4–8.3)
RBC # BLD AUTO: 4.92 10E6/UL (ref 4.4–5.9)
SODIUM SERPL-SCNC: 141 MMOL/L (ref 135–145)
WBC # BLD AUTO: 7.6 10E3/UL (ref 4–11)

## 2024-11-06 PROCEDURE — 36415 COLL VENOUS BLD VENIPUNCTURE: CPT

## 2024-11-06 PROCEDURE — 85025 COMPLETE CBC W/AUTO DIFF WBC: CPT

## 2024-11-06 PROCEDURE — 85652 RBC SED RATE AUTOMATED: CPT

## 2024-11-06 PROCEDURE — 84100 ASSAY OF PHOSPHORUS: CPT

## 2024-11-06 PROCEDURE — 80053 COMPREHEN METABOLIC PANEL: CPT

## 2024-11-06 PROCEDURE — 82550 ASSAY OF CK (CPK): CPT

## 2024-11-06 PROCEDURE — 83735 ASSAY OF MAGNESIUM: CPT

## 2024-11-07 ENCOUNTER — TELEPHONE (OUTPATIENT)
Dept: ONCOLOGY | Facility: CLINIC | Age: 44
End: 2024-11-07
Payer: COMMERCIAL

## 2024-11-07 NOTE — ORAL ONC MGMT
Oral Chemotherapy Monitoring Program    Subjective/Objective:  Connor Emerson is a 44 year old male contacted by phone for a follow-up visit for oral chemotherapy.  Connor confirms taking Lorbrena 100mg once daily.  He denies missed doses, medication changes, and hospitalizations/ED visits.  He has not noticed any side effects of the Lorbrena, but his wife has mentioned that he has been forgetting things lately. He is following with a [facility] pharmacist for his blood sugars.  He has not been monitoring his blood pressure.  He checked it when we were on the phone and it was 164/104.  Let him know this was very high.  He will recheck again once we get off of the phone.  I asked him to bring this up at his next [facility] pharmacist visit on 11/11/24.  He will do that.          8/20/2024    10:00 AM 8/27/2024     3:00 PM 9/9/2024     8:00 AM 9/10/2024     3:00 PM 10/9/2024    10:00 AM 10/15/2024     1:00 PM 11/7/2024    12:00 PM   ORAL CHEMOTHERAPY   Assessment Type Left Voicemail Other Refill Monthly Follow up Refill Left Voicemail Lab Monitoring;Monthly Follow up   Diagnosis Code Non-Small Cell Lung Cancer Non-Small Cell Lung Cancer Non-Small Cell Lung Cancer Non-Small Cell Lung Cancer Non-Small Cell Lung Cancer Non-Small Cell Lung Cancer Non-Small Cell Lung Cancer   Providers Dr Cori Persaud   [facility]   Is this patient followed by the [facility] OC team? No No No No No No No   Drug Name Lorbrena (lorlatinib) Lorbrena (lorlatinib) Lorbrena (lorlatinib) Lorbrena (lorlatinib) Lorbrena (lorlatinib) Lorbrena (lorlatinib) Lorbrena (lorlatinib)   Dose 100 mg 100 mg 100 mg 100 mg 100 mg 100 mg 100 mg   Current Schedule Daily Daily Daily Daily Daily Daily Daily   Cycle Details Continuous Continuous Continuous Continuous Continuous Continuous Continuous   Doses missed in last 2 weeks       0   Adherence  "Assessment       Adherent   Adverse Effects  Hypertension  Fatigue   Other (See Note for Details)   Fatigue    Grade 1      Pharmacist Intervention(fatigue)    No      Hypertension  Grade 2        Pharmacist intervention(hypertension)  Yes        Intervention(s)  Referral to oncology provider        Other (See Note for Details)       low phosphorus   Pharmacist intervention(other)       Yes   Intervention(s)       Patient education   Any new drug interactions?       No   Is the dose as ordered appropriate for the patient?       Yes   Since the last time we talked, have you been hospitalized or used the emergency room?       No       Last PHQ-2 Score on record:       6/19/2024     8:00 AM 12/22/2023     1:46 PM   PHQ-2 ( 1999 [employer])   Q1: Little interest or pleasure in doing things 3  0   Q2: Feeling down, depressed or hopeless 3  0   PHQ-2 Score 6 0   Q1: Little interest or pleasure in doing things Nearly every day    Q2: Feeling down, depressed or hopeless Nearly every day    PHQ-2 Score 6        Patient-reported       Vitals:  BP:   BP Readings from Last 1 Encounters:   08/01/24 (!) 159/103     Wt Readings from Last 1 Encounters:   10/07/24 97.5 kg (215 lb)     Estimated body surface area is 2.18 meters squared as calculated from the following:    Height as of 10/7/24: 1.753 m (5' 9\").    Weight as of 10/7/24: 97.5 kg (215 lb).    Labs:  _  Result Component Current Result Ref Range   Sodium 141 (11/6/2024) 135 - 145 mmol/L     _  Result Component Current Result Ref Range   Potassium 3.8 (11/6/2024) 3.4 - 5.3 mmol/L     _  Result Component Current Result Ref Range   Calcium 9.4 (11/6/2024) 8.8 - 10.4 mg/dL     _  Result Component Current Result Ref Range   Magnesium 2.0 (11/6/2024) 1.7 - 2.3 mg/dL     _  Result Component Current Result Ref Range   Phosphorus 2.1 (L) (11/6/2024) 2.5 - 4.5 mg/dL     _  Result Component Current Result Ref Range   Albumin 4.4 (11/6/2024) 3.5 - 5.2 g/dL     _  Result Component Current " Result Ref Range   Urea Nitrogen 17.0 (11/6/2024) 6.0 - 20.0 mg/dL     _  Result Component Current Result Ref Range   Creatinine 0.67 (11/6/2024) 0.67 - 1.17 mg/dL     _  Result Component Current Result Ref Range   AST 13 (11/6/2024) 0 - 45 U/L     _  Result Component Current Result Ref Range   ALT 19 (11/6/2024) 0 - 70 U/L     _  Result Component Current Result Ref Range   Bilirubin Total 0.2 (11/6/2024) <=1.2 mg/dL     _  Result Component Current Result Ref Range   WBC Count 7.6 (11/6/2024) 4.0 - 11.0 10e3/uL     _  Result Component Current Result Ref Range   Hemoglobin 15.1 (11/6/2024) 13.3 - 17.7 g/dL     _  Result Component Current Result Ref Range   Platelet Count 171 (11/6/2024) 150 - 450 10e3/uL     No results found for ANC within last 30 days.     _  Result Component Current Result Ref Range   Absolute Neutrophils 5.0 (11/6/2024) 1.6 - 8.3 10e3/uL          Assessment/Plan:  Connor is tolerating Lorbrena therapy well.  Will continue current plan of care.     Follow-Up:  ~11/19: Next oncology visit due  12/4: Monthly labs due  12/5: Monthly assessment    Refill Due:  11/8/24    Michelle Castaneda, RenéeD  Hematology/Oncology Clinical Pharmacist  [facility]  [phone]

## 2024-11-08 DIAGNOSIS — I67.89 RADIATION THERAPY INDUCED BRAIN NECROSIS: ICD-10-CM
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
## 2024-11-08 DIAGNOSIS — C34.31 MALIGNANT NEOPLASM OF LOWER LOBE OF RIGHT LUNG (H): Primary | ICD-10-CM

## 2024-11-13 ENCOUNTER — MYC REFILL (OUTPATIENT)
Dept: PALLIATIVE CARE | Facility: CLINIC | Age: 44
End: 2024-11-13
Payer: COMMERCIAL

## 2024-11-13 DIAGNOSIS — Z98.890 S/P CRANIOTOMY: ICD-10-CM

## 2024-11-13 DIAGNOSIS — D49.6 BRAIN TUMOR (H): ICD-10-CM

## 2024-11-13 RX ORDER — OXYCODONE HYDROCHLORIDE 10 MG/1
10 TABLET ORAL
Qty: 120 TABLET | Refills: 0 | Status: SHIPPED | OUTPATIENT
Start: 2024-11-16

## 2024-11-13 NOTE — TELEPHONE ENCOUNTER
Received [employer] message from patient requesting refill of oxycodone.     Last refill: 11/2/24  Last office visit: 10/21/24  Scheduled for follow up 1/2/25     Will route request to MD/ for review.     Reviewed [place]  Report.

## 2024-11-15 ENCOUNTER — VIRTUAL VISIT (OUTPATIENT)
Dept: ONCOLOGY | Facility: CLINIC | Age: 44
End: 2024-11-15
Attending: PHYSICIAN ASSISTANT
Payer: COMMERCIAL

## 2024-11-15 VITALS — HEIGHT: 69 IN | BODY MASS INDEX: 32.58 KG/M2 | WEIGHT: 220 LBS

## 2024-11-15 DIAGNOSIS — C34.31 MALIGNANT NEOPLASM OF LOWER LOBE OF RIGHT LUNG (H): Primary | ICD-10-CM

## 2024-11-15 ASSESSMENT — PAIN SCALES - GENERAL: PAINLEVEL_OUTOF10: NO PAIN (0)

## 2024-11-15 NOTE — PROGRESS NOTES
I called Connor 5-6 times over two hours and couldn't reach him.  I discussed with Candance, wife, she reports he is still dealing with muscle cramps and pain which is the worst.      We will need to reschedule the visit to next week.     Stella Navarro PA-C

## 2024-11-15 NOTE — Clinical Note
11/15/2024      Connor Emerson  [address]  [facility] [address]      Dear Colleague,    Thank you for referring your patient, Connor Emerson, to the [facility]. Please see a copy of my visit note below.    Virtual Visit Details    Type of service:  Video Visit   Video Start Time: 2:41 PM  Video End Time:{video visit start/end time for provider to select:[id number]}    Originating Location (pt. Location): Home  {PROVIDER LOCATION On-site should be selected for visits conducted from your clinic location or adjoining [facility] hospital, academic office, or other nearby [facility] building. Off-site should be selected for all other provider locations, including home:[id number]}  Distant Location (provider location):  On-site  Platform used for Video Visit: [employer]      Virtual Visit Details    Type of service:  Video Visit   Video Start Time: 1:39 PM  Video End Time:1:39 PM    Originating Location (pt. Location): Home  {PROVIDER LOCATION On-site should be selected for visits conducted from your clinic location or adjoining [facility] hospital, academic office, or other nearby [facility] building. Off-site should be selected for all other provider locations, including home:[id number]}  Distant Location (provider location):  On-site  Platform used for Video Visit: [facility]      MEDICAL ONCOLOGY FOLLOW UP NOTE    PATIENT NAME: Connor Emerson  ENCOUNTER DATE: Oct 7, 2024      Care Team  Primary Oncologist: Bassam Persaud MD    REASON FOR CURRENT VISIT: F/u of lung cancer    HISTORY OF PRESENT ILLNESS:  Mr. Connor Emerson is a 44 year old  male who is a non-smoker with PMHx of T2DM, HTN with metastatic NSCLC comes for follow up     Oncologic Hx:    Diagnosis:   Stage IV NSCLC, Rt lung adenocarcinoma with metastasis to pleura, mediastinum , rt pleural effusion and brain diagnosed 1/2022 (AJCC 8th edition)  PD-L1 TPS 2-3% by [place]   NGS [facility] panel-EML4:ALK rearragement  NGS Guardant- GNAS R201H, KRAS K5E- No  ALK    Treatment:   7/10/24-current: Lolatinib 100 mg daily    2/23/2022- 6/2024: Brigatinib. Dose reduced to 60 mg due to cough after two days of 90 mg daily -->back on 90 mg---> increased to 180 mg  ---> settled on 120 mg    6/27/23- Right stealth craniotomy and resection of tumor:     7/20/23- 11/14/24: Bevacizumab for radiation necrosis. Discontinued due to severe HTN.    Past:  2/15/22- GK to 11 brain lesions  3/2/22- 12/2022 Alectinib 300 mg BID (Dose reduced to 450 mg BID from 600 mg BID due to grade 3 myalgias 3/21/22, again reduced to 300 mg BID 9/28/22)  6/28/22- Craniotomy, resection  9/28/22-10/26- Bevacizumab for radiation necrosis (stopped due to PE)      Intent of treatment: Palliative    Oncologic course:  1/19/22 to 1/22/22-Admitted to [facility] for 2 week progressive SOB secondary to have large rt sided pleural effusion, needing thoracentesis x2 (1.7L and 2.0 L removed), cytology positive for malignancy, adenocarcinoma.   1/26/22- Rt pleural mass biopsy-Dr. Agrawal--POSITIVE FOR ADENOCARCINOMA CONSISTENT WITH LUNG PRIMARY, admixed with mesothelial hyperplasia and inflammatory infiltrate (+ TTF-1 and CK 7;  negative  p40, calretinin and WT-1. PAX8 immunostain focal +). 4th thoracentesis done simultaneously - 3L approx removed.   2/1/22- PET/CT-Right lower lobe central infiltrative FDG avid 8.2 x 9.6 cm mass representing a primary lung adenocarcinoma. Ipsilateral right perihilar, bilateral pretracheal, subcarinal and superior mediastinal micehle metastases. Contralateral mildly FDG avid few lung nodules are suspicious for contralateral metastasis. At least 3 intracranial metastases in the right frontal lobe, left frontal lobe and left cerebellar hemisphere. Nonspecific mild diffuse bone marrow uptake. Further evaluation with a spine MRI could be considered to rule out early marrow infiltration. This could also be seen with red marrow conversion.  2/5/22-  Brain MRI- At least 9 intracranial metastases as  detailed above. The dominant lesions involving the orbital right frontal lobe, the posterior left middle frontal gyrus, anterior right temporal lobe and in the left cerebellar hemisphere have surrounding moderate vasogenic type edema.  2/15/22- Saw Dr. Arango from Rad Onc- Rcd GK to 12 lesion in bran  2/16/22- Pleurex placement   3/2/22- Started Alectinib 600 mg BID  3/21/22- Dose reduced to 450 mg BID due to grade 3 myalgias and fatigue  4/2/22 to 4/5/22- Admitted at [facility] for- Severe sepsis due to MSSA infection of right PleurX catheter s/p removal- He presented with onset of pain at tube site starting 4/1; at arrival was tachycardic with leukocytosis (22.7) and elevated lactic acid (2.9).  CT chest showed fluid and stranding tracking outside the pleural space into chest wall along pleural catheter.  IR was consulted and removed catheter 4/2 with report of pustular drainage and tip culture growing MSSA.  Thoracic Surg was consulted who felt no surgical indication necessary given minimal pleural fluid and lack of any signs of abscess.  Initially treated with broad spectrum coverage for sepsis, narrowed to Ancef once sensitivities returned with plan to transition to cefadroxil for an additional 10 days at discharge per ID. Held drug 4/2 to 4/11 5/2/22- CT CAP- Overall, positive response to therapy with decreased size of right lower lobe and right pleural-based masses, pulmonary metastases, hilar and mediastinal lymphadenopathy. However, a single right posterior pleural-based mass has slightly increased in size since 2/24/2022. No metastatic disease in the abdomen and pelvis. Right Pleurx catheter has been removed. Trace right pleural effusion and right basilar atelectasis.  5/2/22- Brain MRI- The previously demonstrated brain metastases are mildly diminished in size versus to 2/5/2022. The degree of edema is also diminished but not completely resolved. Probable trace amounts of intralesional bleeding  demonstrated on the gradient sequence within the metastases. No definite new metastasis or progressive mass effect. No hydrocephalus or infarct.    6/15/22 to 6/17/22- Admitted at [facility]-with aphasia and word finding difficulty over last few weeks.  He presented to [facility] ED on 6/10 for evaluation of his symptoms. MRI brain showed multiple intracranial metastases, with interval enlargement of the dominant lesion within the left frontal lobe and increased surrounding vasogenic edema with 2 mm rightward shift of the septum pellucidum. Due to his worsening anxiety, he left AMA. His symptoms continued to progress to where he could not write at work so he decided to go to the ED for re-evaluation and treatment. Evaluated by NSGY, Rad Onc (radiaiton necrosis vs tumor progression).  6/16/22- MR Brain (6/16) shows multiple intracranial metastases, with interval enlargement  of the dominant lesion within the left frontal lobe and increased surrounding vasogenic edema with 2 mm rightward shift of the septum pellucidum.  6/16/22- - CT CAP shows slightly decreased size of right lower lobe and right pleural-based masses. No new pulmonary nodules or lymphadenopathy; No evidence of metastatic disease in the abdomen or pelvis.   6/28/22 to 6/30/22- Admitted at [facility]- Elective left Stealth craniotomy with resection of brain tumor due to ongoing symptoms. No intraoperative complications. EBL 50 ml.  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  7/5/22- Ct CAP- Right lower lobe low-density nodules are not significantly changed. A small left upper lobe pulmonary nodule is also unchanged. Trace pleural fluid on the right has increased slightly. No convincing evidence for metastatic disease in the abdomen or pelvis.  7/18/22 to 7/19/22- Admitted to [facility] for seizure- Reportedly was only taking once Keppra instead of twice daily. Also resumed on dexamethasone 2 mg daily  8/1/22- Brain MRI- Redemonstrated postsurgical changes status  post left frontoparietal Craniotomy. Interval increase in size of the dominant ring-enhancing lesion in the left posterior superior frontal lobe with increased moderate surrounding vasogenic edema and local mass effect resulting in narrowing of the supratentorial ventricular system. No significant midline shift/herniation at this time    8/1/22- Dex was increased to 4 mg daily by Dr. Moran    9/1/22- CT Chest- Near resolution of previously seen right pleural nodule. Stable right lower lobe pulmonary nodule    9/28/22- Bevacizumab for radiation necrosis    10/26/22- Bevacizumab     10/26/22- Ct CAP- Stable posterior medial right lower lobe 1.9 x 1.1 cm nodule series 8 image 176. Adjacent stable scarring and atelectasis. The previously noted pleural nodule posteriorly on the right is not currently clearly identified. Stable left upper lobe 0.3 cm nodule image 56    10/26/22- Brain MRI- Overall improved appearance of multiple intracranial metastases with near resolution of associated edema and diminished enhancement and size of multiple residual lesions. No definite new or progressive metastasis.    11/7/22 to 11/9/22- Admitted for PE and HTN urgency- Small pulmonary embolism in the right lower lobe pulmonary artery. started on Lovenox, Brain MRI neg for PRES.    12/29/22- ED visit- bilateral hip pain, pain in shoulders, knuckles, knees, and ankles- holding alectinib since 12/20/22 1/6/23- Ct CAP- Mild groundglass nodularity in the left upper lobe is new since the previous exam, and may be infectious in etiology. No other significant interval change. Pulmonary nodules are not significantly changed.    1/6/23- Brain MRI- Stable to diminished sequelae of intracranial metastasis and treatment changes. No new or progressive metastasis. No superimposed acute intracranial finding.     2/23/2022- Start Brigatinib. Dose reduced to 60 mg due to cough after two days of 90 mg daily -  3/23/23-Brigatinib  (increase to 90  mg)    4/20/23- CT CAP- Stable- Previously noted mild groundglass nodularity in the left upper lobe has resolved.Pulmonary nodules are unchanged.Trace amount pleural fluid on the right has decreased slightly.    4/20/23- Brain MRI- Possile progression- Largest metastasis within the right frontal lobe has increased in size with worsening peripheral nodular enhancement and worsening vasogenic edema contributing to new right to left midline shift.  Multiple new/enlarging metastases scattered throughout the cerebral hemispheres and cerebellum. Started on dexamethasone by NGS on 4/28/23 5/17/23- presents to clinic with glucose 627, admission to hospital for stability on insulin drip    6/16/23- Brain MRI- Interval increase in size of the necrotic lesion in the anterior aspect of the right frontal lobe from 2.4 x 2.3 x 2.4 cm previously to 3.0 x 3.5 x 2.8 cm on the current study. Mass effect due to slightly increased vasogenic edema surrounding the right frontal lesion resulted in increase of leftward midline shift of the anterior interhemispheric fissure from 0.7 cm previously to 0.9 cm currently. Interval increase in size in two adjacent metastases at the frontoparietal junction on the left. The remaining scattered intra-axial enhancing nodules are not changed appreciably in size or appearance since the comparison study.No evidence for acute intracranial pathology.   Dr. Moran- Due to worsening headaches and more problems with walking. Recommended a right Stealth craniotomy for resection of the lesion.     6/27/23- Right stealth craniotomy and resection of tumor: Final path: radiation necrosis    7/10/23- Ct CAP- stable Stable exam without evidence for new disease.Stable pulmonary nodules including a dominant nodular opacity at the right lower lobe.    7/20/23- Bevacizumab for radiation necrosis    8/16/23- Bevacizumab     9/12/23- Ct CAP-  Stable right lower lobe dominant nodular opacity. No new disease in the chest,  abdomen and pelvis.     9/15, 10/6, 10/27, 11/17-  Bevacizumab    10/25/23- MRI Brain- 1. Redemonstrated postoperative changes of right frontal craniotomy. Decreased size of the right frontal resection cavity with significant decrease in the surrounding right frontal lobe vasogenic edema seen on the previous study. Mass effect has essentially resolved in the interim and there is no longer any midline shift. In the interim, slightly increased thickness of a rind of peripheral nodular enhancement along the posterior inferior and medial aspects of the right frontal lobe resection cavity, indeterminate. There is no significant elevated cerebral blood volume in this area. The findings may represent evolving posttreatment changes; however, residual tumor is not excluded.  Stable postoperative changes status post left anterior parietal craniotomy with irregular enhancement in the left frontal lobe parenchyma underlying the resection cavity, likely due to posttreatment change. Other scattered supratentorial and infratentorial metastatic lesions are overall similar to slightly decreased in size, as described.    12/5/23- PET/CT- with sole site of disease in the RLL measuring 1.6 x 1.4 cm and with SUV max of 4.2. No other sites of disease. His case was reviewed at tumor board and he met with Dr. Fu 12/14/24 with recommendations against surgical resection due to risk of significant pleural scarring. Continued on brigatinib 120 mg daily.     1/29/24 Brain MRI: No new metastatic lesions. No significant change in supratentorial and infratentorial subcentimeter metastatic enhancing lesions. Stable right inferior frontal and left high frontal postsurgical changes.     12/22-current: lost to follow up due to insurance issues    3/15/24- CT chest- Stable nodule medial right lower lobe. No metastatic disease demonstrated.    6/21/24- CT CAP- stable    6/26/24- Brain MRI- Numerous new and increased size of intracranial metastatic  "lesions. Increased nodular enhancement about the medial aspect of the right frontal lobe resection margin with increased adjacent T2/FLAIR hyperintense signal although without elevated cerebral blood volume could represent posttreatment changes although disease progression could have a similar appearance.  Stable left frontal lobe resection changes with stable underlying curvilinear enhancement.    7/10/24: start lorlatinib 100 mg daily    7/23/24-7/25/24: [facility] with syncopal episodes, suspected to be vasovagal due to low pressures      Interval Hx:  Seeing Connor in via video for follow up. He is at work at the time of the call. He missed his CT last week, rescheduled for 10/10.  He is on lorlatinib. He says he continues to have muscle aches and without the pain meds it is hard to manage. He is able to manage with the current pain meds. He has no restriction. He is taking methadone daily and oxycodone 1-2/day.   Otherwise He is feeling alright.   Not checking his BP recently, last time he chcked are ok.   Takes rosuvastatin.  Sugars are \"so-so\". Reports compliance with BP meds and insulin. He is trying to control his BP.  He has not yet f/up with PCP for new DM meds.  Has had blurry vision for the past few weeks. Denies any focal neuro sx like loss of vision, imbalance, weakness, etc.   Otherwise, feels he is tolerating the lorlatinib well.     ECOG PS 0-1    REVIEW OF SYSTEMS: 14 point ROS negative other than the symptoms noted above in the HPI.    Wt Readings from Last 4 Encounters:   11/15/24 99.8 kg (220 lb)   10/07/24 97.5 kg (215 lb)   08/30/24 98.4 kg (217 lb)   08/01/24 99.8 kg (220 lb)      Review of Systems:  A comprehensive ROS was performed and found to be negative or non-contributory with the exception of that noted in the HPI above.    Past Medical History:  GERD  Hypertension, not on medication  Type 2 diabetes mellitus, not on medications currently, previously on Metformin    Past Surgical " History:  Past Surgical History:   Procedure Laterality Date    BRONCHOSCOPY RIGID OR FLEXIBLE W/TRANSENDOSCOPIC ENDOBRONCHIAL ULTRASOUND GUIDED Bilateral 2022    Procedure: Right BRONCHOSCOPY, FIBEROPTIC, endobronchial ultrasound, pleural biopsy;  Surgeon: Dallin Agrawal MD;  Location: [facility]    INJECT BLOCK MEDIAL BRANCH CERVICAL/THORACIC/LUMBAR      INSERT CHEST TUBE Right 2022    Procedure: INSERTION, CATHETER, INTERCOSTAL, FOR DRAINAGE;  Surgeon: Dallin Agrawal MD;  Location: [facility] GI    INSERT CHEST TUBE Right 3/9/2022    Procedure: INSERTION, CATHETER, INTERCOSTAL, FOR DRAINAGE;  Surgeon: Sushila Antonio MD;  Location: [facility]    IR CHEST TUBE REMOVAL TUNNELED RIGHT  2022    OPTICAL TRACKING SYSTEM CRANIOTOMY, EXCISE TUMOR, COMBINED Left 2022    Procedure: Left stealth craniotomy for tumor resection with motor mapping;  Surgeon: Stephen Moran MD;  Location:  OR    OPTICAL TRACKING SYSTEM CRANIOTOMY, EXCISE TUMOR, COMBINED Right 2023    Procedure: Right stealth craniotomy and resection of tumor;  Surgeon: Stephen Moran MD;  Location:  OR    ORTHOPEDIC SURGERY      Ganesh. Rotator cuff repair.    PLEUROSCOPY N/A 2022    Procedure: Pleuroscopy with Pleural Biopsy;  Surgeon: Dallin Agrawal MD;  Location:  OR       Social History:  Lives with wife and 4 kids in [place]. Works as a  for an apartment complex in [place]. Exposure to household chemicals and . No significant exposure to asbestos. No signal exposure to benzene or similar chemicals. No significant smoking history-states that he smoked 1 to 2 cigarettes occasionally per month for about 2 years in college, non-smoking since then. No significant alcohol use history. No other recreational substances. Good support system. Kids are 23, 19, 17 and 13.    Family History  Significant history for cancers on maternal side. Mother  of uterine cancer. 2 maternal uncles have possible  metastatic melanoma.    Outpatient Medications:  Current Outpatient Medications   Medication Sig Dispense Refill    acetaminophen (TYLENOL) 325 MG tablet Take 325-650 mg by mouth every 6 hours as needed for mild pain      albuterol (PROAIR HFA/PROVENTIL HFA/VENTOLIN HFA) 108 (90 Base) MCG/ACT inhaler Inhale 2 puffs into the lungs every 6 hours as needed for shortness of breath, wheezing or cough (Patient not taking: Reported on 10/16/2024) 18 g 0    Alcohol Swabs PADS Use to swab the area of the injection or jabier as directed. Per insurance coverage 100 each 0    amLODIPine (NORVASC) 10 MG tablet Take 1 tablet (10 mg) by mouth daily. 90 tablet 0    blood glucose (NO BRAND SPECIFIED) lancets standard To use to test glucose level in the blood Use to test blood sugar  4  times daily as directed. To accompany glucose monitor brands per insurance coverage. 100 each 3    blood glucose (NO BRAND SPECIFIED) test strip To use to test glucose level in the blood Use to test blood sugar  4 times daily as directed. To accompany glucose monitor brands per insurance coverage. 100 strip 3    Blood Glucose Monitoring Suppl (ACCU-CHEK GUIDE) w/Device KIT       Continuous Glucose Sensor (FREESTYLE IRENE 3 SENSOR) MISC 1 each every 14 days. Use 1 sensor every 14 days. Use to read blood sugars per 's instructions. 6 each 3    cyclobenzaprine (FLEXERIL) 5 MG tablet Take 2 tablets (10 mg) by mouth nightly as needed for muscle spasms 30 tablet 4    DULoxetine (CYMBALTA) 30 MG capsule Take 1 capsule (30 mg) by mouth 2 times daily. 60 capsule 2    empagliflozin (JARDIANCE) 25 MG TABS tablet Take 1 tablet (25 mg) by mouth daily 90 tablet 1    FLUoxetine (PROZAC) 20 MG capsule Take 1 capsule (20 mg) by mouth daily 30 capsule 1    insulin aspart (NOVOLOG PEN) 100 UNIT/ML pen Inject 0-40 Units Subcutaneous 3 times daily (before meals) Per discharge instructions sliding scale (Patient not taking: Reported on 8/5/2024) 45 mL 2     insulin glargine (LANTUS PEN) 100 UNIT/ML pen Inject 80 Units subcutaneously every morning. 30 mL 1    insulin pen needle (32G X 4 MM) 32G X 4 MM miscellaneous Use as directed by provider. Per insurance coverage 100 each 0    levETIRAcetam (KEPPRA) 1000 MG tablet Take 1 tablet (1,000 mg) by mouth 2 times daily 60 tablet 11    lisinopril (ZESTRIL) 40 MG tablet Take 1 tablet (40 mg) by mouth daily 30 tablet 1    lorlatinib (LORBRENA) 100 MG Take 1 tablet (100 mg) by mouth daily 30 tablet 0    metFORMIN (GLUCOPHAGE) 500 MG tablet Take 1 tablet (500 mg) by mouth 2 times daily (with meals) 60 tablet 3    methadone (DOLOPHINE) 5 MG tablet Take 1 tablet (5 mg) by mouth 2 times daily. 60 tablet 0    methylphenidate (RITALIN) 5 MG tablet Take 1-2 tablets (5-10 mg) by mouth 2 times daily as needed (fatigue). Take second dose by 12 noon, if it is needed 90 tablet 0    naloxone (NARCAN) 4 MG/0.1ML nasal spray Spray 1 spray (4 mg) into one nostril alternating nostrils as needed for opioid reversal every 2-3 minutes until assistance arrives (Patient not taking: Reported on 9/4/2024) 0.2 mL 3    [START ON 11/16/2024] oxyCODONE IR (ROXICODONE) 10 MG tablet Take 1 tablet (10 mg) by mouth every 3 hours as needed for moderate pain. 120 tablet 0    prochlorperazine (COMPAZINE) 10 MG tablet Take 1 tablet (10 mg) by mouth every 6 hours as needed for nausea or vomiting (Patient not taking: Reported on 8/5/2024) 30 tablet 2    propranolol (INDERAL) 20 MG tablet Take 1 tablet (20 mg) by mouth 2 times daily 60 tablet 1    rivaroxaban ANTICOAGULANT (XARELTO) 20 MG TABS tablet Take 1 tablet (20 mg) by mouth daily (with dinner) 90 tablet 3    rosuvastatin (CRESTOR) 10 MG tablet Take 1 tablet (10 mg) by mouth daily. 90 tablet 2    semaglutide (OZEMPIC) 2 MG/3ML pen Inject 0.25 mg subcutaneously every 7 days. For 4 weeks then increase to 0.5 mg once weekly if tolerating 3 mL 0     No current facility-administered medications for this visit.  "    Ht 1.753 m (5' 9\")   Wt 99.8 kg (220 lb)   BMI 32.49 kg/m    General: No acute distress, no rashes on skin.   HEENT: Sclera anicteric. Oral mucosa pink and moist.  No mucositis or thrush  Heart: Regular, rate, and rhythm  Lungs: Clear to ascultation bilaterally. Breathing comfortably  Abdomen: Positive bowel sounds. Soft, non-distended, non-tender.   Extremities: no lower extremity edema  Neuro: Cranial nerves grossly intact      Labs & Studies: I personally reviewed the following studies:  Most Recent 3 CBC's:  Recent Labs   Lab Test 11/06/24  1510 10/04/24  1506 08/28/24  1157   WBC 7.6 9.6 9.2   HGB 15.1 14.5 13.8   MCV 90 92 93    193 139*     Most Recent 3 BMP's:  Recent Labs   Lab Test 11/06/24  1510 10/04/24  1506 08/28/24  1157    136 142   POTASSIUM 3.8 3.8 4.0   CHLORIDE 103 99 105   CO2 25 24 25   BUN 17.0 13.2 13.0   CR 0.67 0.75 0.73   ANIONGAP 13 13 12   TORY 9.4 9.5 8.9   * 419* 417*    Most Recent 2 LFT's:  Recent Labs   Lab Test 11/06/24  1510 10/04/24  1506   AST 13 20   ALT 19 29   ALKPHOS 118 105   BILITOTAL 0.2 0.3    Most Recent TSH and T4:  Recent Labs   Lab Test 09/12/22  1642   TSH 2.56        ASSESSMENT AND PLAN:  Stage IV NSCLC, Rt lung adenocarcinoma with metastasis to pleura, mediastinum , rt pleural effusion and brain diagnosed 1/2022 (AJCC 8th edition)  PD-L1 TPS 2-3% by [facility]   NGS [facility] panel-EML4:ALK rearragement; chr2:[id number], chr2:[id number]  NGS [employer]- GNAS R201H, KRAS K5E- No ALK    He began Alectinib 600 mg BID 3/2/22 and unfortunately developed grade 3 myalgias  requiring dose reduction to 300 mg BID.   In Dec 2022,we stopped drug 12/20/22 due to unremitting arthalgias, eventully had to starte PO steroids which led to improvement and resolved. Radiographicaly, he has had a near CR to Rx in the lung, has a residual rt LL lesion.     Began brigatinib 2/22/23 (delayed due to pt hesitancy). Developed severe cough on day 2 so brigatinib was held and " cough resolved with in 24-48 hours. He restarted 60 mg daily and upped it to 90 mg.Restging CT showing stable disease in the lung, however, MRI with several contrast enhancement and increase in size of previously treated lesions. His dose was increased to 180 mg daily to address possible CNS disease progression and started on dexamethasone. 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 Restaging CT in 9/2023 showing overall disease stablity with no evidence of active cancer. 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  PET/CT after these three months showed oligoperisstent disease with a single focus of active malignancy in the RLL. Met with Thoracic surgery 12/2023 to discuss resection but they recommended against it due to risk for scarring after. 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 MOst recent CT showing stable disease but brain MRI showing several possible new lesions and enlargement of exissting lesion (see below).     Switched Rx to Lorlatinib due to good CNS penetration. Now after about 12 weeks on therapy, repeat MRI end of August (6 weeks on Rx) with overall positive response, but a few enlarging and edematous lesions. On discussionw ith Dr. Arango this was though to be Rx related changes and plan to to next MRI in 3 months. F/up CT is pending.   Overall tolerating Rx ok, has chronci MSK pain that is well controlled with pain meds. For Hypercholestroemia he is on cresotor, may need to escalate iof Tryglycerides continue to increase.    Plan  Continue lorlatinib   Monthly labs  RTC with NP/PA in 6 weeks  CT in 3 months  RTC after CT    #syncopal episodes: reviewed hospital notes with extensive work up. suspected to be vasovagal due to low pressures. No further  episodes.     # Brain mets:   # Radiation necrosis,   -Baseline Brain MRI with several brain mets, s/p GK to 11-12 brain mets. F/u Brain MRI in June 2022 was showing enlargement of the one of the lesions along with edema, therefore had to undergo craniotomy followed by resection, the final biopsy consistent with radiation necrosis.   -received two doses bevacizumab for radiation necrosis in Fall 2022, tolerated poorly with HTN urgency and PE.)   -MRI in 4/2023 now showing contrast enhancement of lesions and a dominate lesion in the R frontal region with edema, several other smaller lesion. Short term MRI showing increase in size of the rt frontal lesion, underwent resection, patho again showing radiation necrosis. Restarted bevacizumab, which resulted in improved radiation necrosis / vasogenic edema on imaging in 10/2023 but ultimately was stopped due to uncontrolled HTN, last dose being 11/2023  -MRI imaging 1/2024 with stable disease and no edema.  -Brain MRI 3/2024 cancelled due to lapse in medical insurance.  -MRI 6/2024 showing showing several possible new lesions and enlargement of exissting lesion, however I am unsure if this is due to better imaging (perfusion MRI this time). Reviewed with rad onc and elected for short term MRI scan following initiation of lorlatinib, hold off on GK.  -MRI 8/28/24 with overall positive treatment response from rx but Two metastases (right parietal, left cerebellar) appears slightly enlarged, and Increased vasogenic edema associated with right temporal and left cerebellar metastases.    9/4/24. Dr. Arango reviewed brain MRI and feels ongoing surveillance for now is adequate. R parietal lesion overall stable compared to April 2023 MRI and while L cerebellar is more pronounced, it is stable in size and in prior treatment.   Given he is asymptomatic and has grossly uncontrolled diabetes, I deferring dex at this time.     #HTN: continue lisinopril and propranolol, norvasc to 10  mg    #hypercholesteremia   Recently Increased rosuvastatin from 5 to 10 mg; confirmed he is taking, Tryglycierides increasing, f/unit(s) pcholestrol in 3 months    #Right pleuritic/chest pain  Felt to be 2/2 prior pleurex drain.   -On methadone and oxycodone; followed by Palliative Care      #Elevated Lipase  -mild elevation. No concern on exam    #Diabetes, Type 2  Hyperglycemia on labs. Compliant with insulin but not very adherent to diet. Started on metformin , and insulin. Workign with PCP for ?new ozempic    #PE: provoked by malignancy vs bevacizumab in 11/2022  -- on rivaroxiabn 20 mg daily    #Grade 2-3 arthralgias    All joints affected, no morning stiffness, normal ESR and CRP  -felt to be 2/2 treatment. Stable as of late           I called Connor 5-6 times over two hours and couldn't reach him.  I discussed with Candance, wife, she reports he is still dealing with muscle cramps and pain which is the worst.      We will need to reschedule the visit to next week.     Stella Navarro PA-C      Again, thank you for allowing me to participate in the care of your patient.        Sincerely,        Sarina Navarro PA-C

## 2024-11-15 NOTE — NURSING NOTE
Current patient location: [address] [place] [address]   [facility] [id number]    Is the patient currently in the state of [place]? YES    Visit mode:VIDEO    If the visit is dropped, the patient can be reconnected by:VIDEO VISIT: Text to cell phone:   Telephone Information:   Mobile [phone]       Will anyone else be joining the visit? NO  (If patient encounters technical issues they should call [phone])    Are changes needed to the allergy or medication list? No    Are refills needed on medications prescribed by this physician? NO    Rooming Documentation:  Questionnaire(s) completed    Reason for visit: RECHECK    Christian MORALESF

## 2024-11-18 ENCOUNTER — MYC REFILL (OUTPATIENT)
Dept: FAMILY MEDICINE | Facility: CLINIC | Age: 44
End: 2024-11-18
Payer: COMMERCIAL

## 2024-11-18 DIAGNOSIS — F32.3 MAJOR DEPRESSIVE DISORDER, SINGLE EPISODE, SEVERE WITH PSYCHOTIC FEATURES (H): ICD-10-CM

## 2024-11-19 ENCOUNTER — VIRTUAL VISIT (OUTPATIENT)
Dept: ONCOLOGY | Facility: CLINIC | Age: 44
End: 2024-11-19
Attending: STUDENT IN AN ORGANIZED HEALTH CARE EDUCATION/TRAINING PROGRAM
Payer: COMMERCIAL

## 2024-11-19 VITALS — WEIGHT: 212 LBS | HEIGHT: 69 IN | BODY MASS INDEX: 31.4 KG/M2
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
## 2024-11-19 DIAGNOSIS — C34.31 MALIGNANT NEOPLASM OF LOWER LOBE OF RIGHT LUNG (H): Primary | ICD-10-CM

## 2024-11-19 DIAGNOSIS — I67.89 RADIATION THERAPY INDUCED BRAIN NECROSIS: ICD-10-CM

## 2024-11-19 PROCEDURE — 99215 OFFICE O/P EST HI 40 MIN: CPT | Mod: 95 | Performed by: PHYSICIAN ASSISTANT

## 2024-11-19 PROCEDURE — G2211 COMPLEX E/M VISIT ADD ON: HCPCS | Mod: 95 | Performed by: PHYSICIAN ASSISTANT

## 2024-11-19 ASSESSMENT — PAIN SCALES - GENERAL: PAINLEVEL_OUTOF10: NO PAIN (0)

## 2024-11-19 NOTE — LETTER
11/19/2024      Connor Emerson  [address]  [facility] [address]      Dear Colleague,    Thank you for referring your patient, Connor Emerson, to the [facility]. Please see a copy of my visit note below.    Virtual Visit Details    Type of service:  Video Visit   Video Start Time: 7:57 AM  Video End Time: 8:20    Originating Location (pt. Location): Home    Distant Location (provider location):  Off-site  Platform used for Video Visit: [facility] ONCOLOGY FOLLOW UP NOTE    PATIENT NAME: Connor Emerson  ENCOUNTER DATE: Nov 19, 2024        Care Team  Primary Oncologist: Bassam Persaud MD    REASON FOR CURRENT VISIT: F/u of lung cancer    HISTORY OF PRESENT ILLNESS:  Mr. Connor Emerson is a 44 year old  male who is a non-smoker with PMHx of T2DM, HTN with metastatic NSCLC comes for follow up     Oncologic Hx:    Diagnosis:   Stage IV NSCLC, Rt lung adenocarcinoma with metastasis to pleura, mediastinum , rt pleural effusion and brain diagnosed 1/2022 (AJCC 8th edition)  PD-L1 TPS 2-3% by [place]   NGS [facility] panel-EML4:ALK rearragement  NGS Guardant- GNAS R201H, KRAS K5E- No ALK    Treatment:   7/10/24-current: Lolatinib 100 mg daily    2/23/2022- 6/2024: Brigatinib. Dose reduced to 60 mg due to cough after two days of 90 mg daily -->back on 90 mg---> increased to 180 mg  ---> settled on 120 mg    6/27/23- Right stealth craniotomy and resection of tumor:     7/20/23- 11/14/24: Bevacizumab for radiation necrosis. Discontinued due to severe HTN.    Past:  2/15/22- GK to 11 brain lesions  3/2/22- 12/2022 Alectinib 300 mg BID (Dose reduced to 450 mg BID from 600 mg BID due to grade 3 myalgias 3/21/22, again reduced to 300 mg BID 9/28/22)  6/28/22- Craniotomy, resection  9/28/22-10/26- Bevacizumab for radiation necrosis (stopped due to PE)      Intent of treatment: Palliative    Oncologic course:  1/19/22 to 1/22/22-Admitted to [facility] for 2 week progressive SOB secondary to  have large rt sided pleural effusion, needing thoracentesis x2 (1.7L and 2.0 L removed), cytology positive for malignancy, adenocarcinoma.   1/26/22- Rt pleural mass biopsy-Dr. Agrawal--POSITIVE FOR ADENOCARCINOMA CONSISTENT WITH LUNG PRIMARY, admixed with mesothelial hyperplasia and inflammatory infiltrate (+ TTF-1 and CK 7;  negative  p40, calretinin and WT-1. PAX8 immunostain focal +). 4th thoracentesis done simultaneously - 3L approx removed.   2/1/22- PET/CT-Right lower lobe central infiltrative FDG avid 8.2 x 9.6 cm mass representing a primary lung adenocarcinoma. Ipsilateral right perihilar, bilateral pretracheal, subcarinal and superior mediastinal michele metastases. Contralateral mildly FDG avid few lung nodules are suspicious for contralateral metastasis. At least 3 intracranial metastases in the right frontal lobe, left frontal lobe and left cerebellar hemisphere. Nonspecific mild diffuse bone marrow uptake. Further evaluation with a spine MRI could be considered to rule out early marrow infiltration. This could also be seen with red marrow conversion.  2/5/22-  Brain MRI- At least 9 intracranial metastases as detailed above. The dominant lesions involving the orbital right frontal lobe, the posterior left middle frontal gyrus, anterior right temporal lobe and in the left cerebellar hemisphere have surrounding moderate vasogenic type edema.  2/15/22- Saw Dr. Arango from Rad Onc- Rcd GK to 12 lesion in bran  2/16/22- Pleurex placement   3/2/22- Started Alectinib 600 mg BID  3/21/22- Dose reduced to 450 mg BID due to grade 3 myalgias and fatigue  4/2/22 to 4/5/22- Admitted at [facility] for- Severe sepsis due to MSSA infection of right PleurX catheter s/p removal- He presented with onset of pain at tube site starting 4/1; at arrival was tachycardic with leukocytosis (22.7) and elevated lactic acid (2.9).  CT chest showed fluid and stranding tracking outside the pleural space into chest wall along pleural  catheter.  IR was consulted and removed catheter 4/2 with report of pustular drainage and tip culture growing MSSA.  Thoracic Surg was consulted who felt no surgical indication necessary given minimal pleural fluid and lack of any signs of abscess.  Initially treated with broad spectrum coverage for sepsis, narrowed to Ancef once sensitivities returned with plan to transition to cefadroxil for an additional 10 days at discharge per ID. Held drug 4/2 to 4/11 5/2/22- CT CAP- Overall, positive response to therapy with decreased size of right lower lobe and right pleural-based masses, pulmonary metastases, hilar and mediastinal lymphadenopathy. However, a single right posterior pleural-based mass has slightly increased in size since 2/24/2022. No metastatic disease in the abdomen and pelvis. Right Pleurx catheter has been removed. Trace right pleural effusion and right basilar atelectasis.  5/2/22- Brain MRI- The previously demonstrated brain metastases are mildly diminished in size versus to 2/5/2022. The degree of edema is also diminished but not completely resolved. Probable trace amounts of intralesional bleeding demonstrated on the gradient sequence within the metastases. No definite new metastasis or progressive mass effect. No hydrocephalus or infarct.    6/15/22 to 6/17/22- Admitted at [facility]-with aphasia and word finding difficulty over last few weeks.  He presented to [facility] ED on 6/10 for evaluation of his symptoms. MRI brain showed multiple intracranial metastases, with interval enlargement of the dominant lesion within the left frontal lobe and increased surrounding vasogenic edema with 2 mm rightward shift of the septum pellucidum. Due to his worsening anxiety, he left AMA. His symptoms continued to progress to where he could not write at work so he decided to go to the ED for re-evaluation and treatment. Evaluated by NSGY, Rad Onc (radiaiton necrosis vs tumor progression).  6/16/22- MR Brain (6/16) shows  multiple intracranial metastases, with interval enlargement  of the dominant lesion within the left frontal lobe and increased surrounding vasogenic edema with 2 mm rightward shift of the septum pellucidum.  6/16/22- - CT CAP shows slightly decreased size of right lower lobe and right pleural-based masses. No new pulmonary nodules or lymphadenopathy; No evidence of metastatic disease in the abdomen or pelvis.   6/28/22 to 6/30/22- Admitted at [facility]- Elective left Stealth craniotomy with resection of brain tumor due to ongoing symptoms. No intraoperative complications. EBL 50 ml.  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  7/5/22- Ct CAP- Right lower lobe low-density nodules are not significantly changed. A small left upper lobe pulmonary nodule is also unchanged. Trace pleural fluid on the right has increased slightly. No convincing evidence for metastatic disease in the abdomen or pelvis.  7/18/22 to 7/19/22- Admitted to [facility] for seizure- Reportedly was only taking once Keppra instead of twice daily. Also resumed on dexamethasone 2 mg daily  8/1/22- Brain MRI- Redemonstrated postsurgical changes status post left frontoparietal Craniotomy. Interval increase in size of the dominant ring-enhancing lesion in the left posterior superior frontal lobe with increased moderate surrounding vasogenic edema and local mass effect resulting in narrowing of the supratentorial ventricular system. No significant midline shift/herniation at this time    8/1/22- Dex was increased to 4 mg daily by Dr. Moran    9/1/22- CT Chest- Near resolution of previously seen right pleural nodule. Stable right lower lobe pulmonary nodule    9/28/22- Bevacizumab for radiation necrosis    10/26/22- Bevacizumab     10/26/22- Ct CAP- Stable posterior medial right lower lobe 1.9 x 1.1 cm nodule series 8 image 176. Adjacent stable scarring and atelectasis. The previously noted pleural nodule posteriorly on the right is not currently clearly  identified. Stable left upper lobe 0.3 cm nodule image 56    10/26/22- Brain MRI- Overall improved appearance of multiple intracranial metastases with near resolution of associated edema and diminished enhancement and size of multiple residual lesions. No definite new or progressive metastasis.    11/7/22 to 11/9/22- Admitted for PE and HTN urgency- Small pulmonary embolism in the right lower lobe pulmonary artery. started on Lovenox, Brain MRI neg for PRES.    12/29/22- ED visit- bilateral hip pain, pain in shoulders, knuckles, knees, and ankles- holding alectinib since 12/20/22 1/6/23- Ct CAP- Mild groundglass nodularity in the left upper lobe is new since the previous exam, and may be infectious in etiology. No other significant interval change. Pulmonary nodules are not significantly changed.    1/6/23- Brain MRI- Stable to diminished sequelae of intracranial metastasis and treatment changes. No new or progressive metastasis. No superimposed acute intracranial finding.     2/23/2022- Start Brigatinib. Dose reduced to 60 mg due to cough after two days of 90 mg daily -  3/23/23-Brigatinib  (increase to 90 mg)    4/20/23- CT CAP- Stable- Previously noted mild groundglass nodularity in the left upper lobe has resolved.Pulmonary nodules are unchanged.Trace amount pleural fluid on the right has decreased slightly.    4/20/23- Brain MRI- Possile progression- Largest metastasis within the right frontal lobe has increased in size with worsening peripheral nodular enhancement and worsening vasogenic edema contributing to new right to left midline shift.  Multiple new/enlarging metastases scattered throughout the cerebral hemispheres and cerebellum. Started on dexamethasone by NGS on 4/28/23 5/17/23- presents to clinic with glucose 627, admission to hospital for stability on insulin drip    6/16/23- Brain MRI- Interval increase in size of the necrotic lesion in the anterior aspect of the right frontal lobe from 2.4 x  2.3 x 2.4 cm previously to 3.0 x 3.5 x 2.8 cm on the current study. Mass effect due to slightly increased vasogenic edema surrounding the right frontal lesion resulted in increase of leftward midline shift of the anterior interhemispheric fissure from 0.7 cm previously to 0.9 cm currently. Interval increase in size in two adjacent metastases at the frontoparietal junction on the left. The remaining scattered intra-axial enhancing nodules are not changed appreciably in size or appearance since the comparison study.No evidence for acute intracranial pathology.   Dr. Moran- Due to worsening headaches and more problems with walking. Recommended a right Stealth craniotomy for resection of the lesion.     6/27/23- Right stealth craniotomy and resection of tumor: Final path: radiation necrosis    7/10/23- Ct CAP- stable Stable exam without evidence for new disease.Stable pulmonary nodules including a dominant nodular opacity at the right lower lobe.    7/20/23- Bevacizumab for radiation necrosis    8/16/23- Bevacizumab     9/12/23- Ct CAP-  Stable right lower lobe dominant nodular opacity. No new disease in the chest, abdomen and pelvis.     9/15, 10/6, 10/27, 11/17-  Bevacizumab    10/25/23- MRI Brain- 1. Redemonstrated postoperative changes of right frontal craniotomy. Decreased size of the right frontal resection cavity with significant decrease in the surrounding right frontal lobe vasogenic edema seen on the previous study. Mass effect has essentially resolved in the interim and there is no longer any midline shift. In the interim, slightly increased thickness of a rind of peripheral nodular enhancement along the posterior inferior and medial aspects of the right frontal lobe resection cavity, indeterminate. There is no significant elevated cerebral blood volume in this area. The findings may represent evolving posttreatment changes; however, residual tumor is not excluded.  Stable postoperative changes status post  left anterior parietal craniotomy with irregular enhancement in the left frontal lobe parenchyma underlying the resection cavity, likely due to posttreatment change. Other scattered supratentorial and infratentorial metastatic lesions are overall similar to slightly decreased in size, as described.    12/5/23- PET/CT- with sole site of disease in the RLL measuring 1.6 x 1.4 cm and with SUV max of 4.2. No other sites of disease. His case was reviewed at tumor board and he met with Dr. Fu 12/14/24 with recommendations against surgical resection due to risk of significant pleural scarring. Continued on brigatinib 120 mg daily.     1/29/24 Brain MRI: No new metastatic lesions. No significant change in supratentorial and infratentorial subcentimeter metastatic enhancing lesions. Stable right inferior frontal and left high frontal postsurgical changes.     12/22-current: lost to follow up due to insurance issues    3/15/24- CT chest- Stable nodule medial right lower lobe. No metastatic disease demonstrated.    6/21/24- CT CAP- stable    6/26/24- Brain MRI- Numerous new and increased size of intracranial metastatic lesions. Increased nodular enhancement about the medial aspect of the right frontal lobe resection margin with increased adjacent T2/FLAIR hyperintense signal although without elevated cerebral blood volume could represent posttreatment changes although disease progression could have a similar appearance.  Stable left frontal lobe resection changes with stable underlying curvilinear enhancement.    7/10/24: start lorlatinib 100 mg daily    7/23/24-7/25/24: [facility] with syncopal episodes, suspected to be vasovagal due to low pressures      Interval Hx:  Seeing Connor in via video for follow up. He is at work at the time of the call. He missed his CT and other follow up.   He is on lorlatinib. He says he continues to have muscle aches and without the pain meds it is hard to manage. He is unable to sustain working  "with this level of severe pain and weakness in his body.  He feels he just \"ran a marathon.\"   He is exhausted, he cannot work and he is thinking about doing disability.  He drags all day in severe pain and weakness.  Its unbearable. Usually around noon-- things \"loosen up\" and he has less pain in the afternoon.  In bed by 9 pm - no napping.   He is taking methadone daily and oxycodone 3-4/day.   Friday- had had syncope- felt weak, sat down and passed out.  This occurred 2 x last week.  Not similar to July episodes. He is aware that he is lightheaded and sits but   BG- has been mid 100s, okay  BP- slightly high but ok 160/90's    Takes rosuvastatin, blood thinner, compliant with insulin/DM medications  He has not yet f/up with PCP for new DM meds.  Started Ozempic - has caused some diarrhea, but minor at this point.     Has had blurry vision, no headaches. Hands/feet tingling at bedtime, this is new. No balance issues. Also noted edema of the hands/feet.     ECOG PS 2    REVIEW OF SYSTEMS: 14 point ROS negative other than the symptoms noted above in the HPI.    Wt Readings from Last 4 Encounters:   11/19/24 96.2 kg (212 lb)   11/15/24 99.8 kg (220 lb)   10/07/24 97.5 kg (215 lb)   08/30/24 98.4 kg (217 lb)      Review of Systems:  A comprehensive ROS was performed and found to be negative or non-contributory with the exception of that noted in the HPI above.    Past Medical History:  GERD  Hypertension, not on medication  Type 2 diabetes mellitus, not on medications currently, previously on Metformin    Past Surgical History:  Past Surgical History:   Procedure Laterality Date     BRONCHOSCOPY RIGID OR FLEXIBLE W/TRANSENDOSCOPIC ENDOBRONCHIAL ULTRASOUND GUIDED Bilateral 1/26/2022    Procedure: Right BRONCHOSCOPY, FIBEROPTIC, endobronchial ultrasound, pleural biopsy;  Surgeon: Dallin Agrawal MD;  Location: [facility] OR     INJECT BLOCK MEDIAL BRANCH CERVICAL/THORACIC/LUMBAR       INSERT CHEST TUBE Right 2/16/2022    " Procedure: INSERTION, CATHETER, INTERCOSTAL, FOR DRAINAGE;  Surgeon: Dallin Agrawal MD;  Location: [facility] GI     INSERT CHEST TUBE Right 3/9/2022    Procedure: INSERTION, CATHETER, INTERCOSTAL, FOR DRAINAGE;  Surgeon: Sushila Antonio MD;  Location:  GI     IR CHEST TUBE REMOVAL TUNNELED RIGHT  2022     OPTICAL TRACKING SYSTEM CRANIOTOMY, EXCISE TUMOR, COMBINED Left 2022    Procedure: Left stealth craniotomy for tumor resection with motor mapping;  Surgeon: Stephen Moran MD;  Location:  OR     OPTICAL TRACKING SYSTEM CRANIOTOMY, EXCISE TUMOR, COMBINED Right 2023    Procedure: Right stealth craniotomy and resection of tumor;  Surgeon: Stephen Moran MD;  Location:  OR     ORTHOPEDIC SURGERY      Ganesh. Rotator cuff repair.     PLEUROSCOPY N/A 2022    Procedure: Pleuroscopy with Pleural Biopsy;  Surgeon: Dallin Agrawal MD;  Location:  OR       Social History:  Lives with wife and 4 kids in [place]. Works as a  for an [employer] in [place]. Exposure to household chemicals and . No significant exposure to asbestos. No signal exposure to benzene or similar chemicals. No significant smoking history-states that he smoked 1 to 2 cigarettes occasionally per month for about 2 years in college, non-smoking since then. No significant alcohol use history. No other recreational substances. Good support system. Kids are 23, 19, 17 and 13.    Family History  Significant history for cancers on maternal side. Mother  of uterine cancer. 2 maternal uncles have possible metastatic melanoma.    Outpatient Medications:  Current Outpatient Medications   Medication Sig Dispense Refill     acetaminophen (TYLENOL) 325 MG tablet Take 325-650 mg by mouth every 6 hours as needed for mild pain       albuterol (PROAIR HFA/PROVENTIL HFA/VENTOLIN HFA) 108 (90 Base) MCG/ACT inhaler Inhale 2 puffs into the lungs every 6 hours as needed for shortness of breath, wheezing  or cough (Patient not taking: Reported on 10/16/2024) 18 g 0     Alcohol Swabs PADS Use to swab the area of the injection or jabier as directed. Per insurance coverage 100 each 0     amLODIPine (NORVASC) 10 MG tablet Take 1 tablet (10 mg) by mouth daily. 90 tablet 0     blood glucose (NO BRAND SPECIFIED) lancets standard To use to test glucose level in the blood Use to test blood sugar  4  times daily as directed. To accompany glucose monitor brands per insurance coverage. 100 each 3     blood glucose (NO BRAND SPECIFIED) test strip To use to test glucose level in the blood Use to test blood sugar  4 times daily as directed. To accompany glucose monitor brands per insurance coverage. 100 strip 3     Blood Glucose Monitoring Suppl (ACCU-CHEK GUIDE) w/Device KIT        Continuous Glucose Sensor (FREESTYLE IRENE 3 SENSOR) MISC 1 each every 14 days. Use 1 sensor every 14 days. Use to read blood sugars per 's instructions. 6 each 3     cyclobenzaprine (FLEXERIL) 5 MG tablet Take 2 tablets (10 mg) by mouth nightly as needed for muscle spasms 30 tablet 4     DULoxetine (CYMBALTA) 30 MG capsule Take 1 capsule (30 mg) by mouth 2 times daily. 60 capsule 2     empagliflozin (JARDIANCE) 25 MG TABS tablet Take 1 tablet (25 mg) by mouth daily 90 tablet 1     FLUoxetine (PROZAC) 20 MG capsule Take 1 capsule (20 mg) by mouth daily. 30 capsule 1     insulin aspart (NOVOLOG PEN) 100 UNIT/ML pen Inject 0-40 Units Subcutaneous 3 times daily (before meals) Per discharge instructions sliding scale (Patient not taking: Reported on 8/5/2024) 45 mL 2     insulin glargine (LANTUS PEN) 100 UNIT/ML pen Inject 80 Units subcutaneously every morning. 30 mL 1     insulin pen needle (32G X 4 MM) 32G X 4 MM miscellaneous Use as directed by provider. Per insurance coverage 100 each 0     levETIRAcetam (KEPPRA) 1000 MG tablet Take 1 tablet (1,000 mg) by mouth 2 times daily 60 tablet 11     lisinopril (ZESTRIL) 40 MG tablet Take 1 tablet (40  "mg) by mouth daily 30 tablet 1     lorlatinib (LORBRENA) 100 MG Take 1 tablet (100 mg) by mouth daily 30 tablet 0     metFORMIN (GLUCOPHAGE) 500 MG tablet Take 1 tablet (500 mg) by mouth 2 times daily (with meals) 60 tablet 3     methadone (DOLOPHINE) 5 MG tablet Take 1 tablet (5 mg) by mouth 2 times daily. 60 tablet 0     methylphenidate (RITALIN) 5 MG tablet Take 1-2 tablets (5-10 mg) by mouth 2 times daily as needed (fatigue). Take second dose by 12 noon, if it is needed 90 tablet 0     naloxone (NARCAN) 4 MG/0.1ML nasal spray Spray 1 spray (4 mg) into one nostril alternating nostrils as needed for opioid reversal every 2-3 minutes until assistance arrives (Patient not taking: Reported on 9/4/2024) 0.2 mL 3     oxyCODONE IR (ROXICODONE) 10 MG tablet Take 1 tablet (10 mg) by mouth every 3 hours as needed for moderate pain. 120 tablet 0     prochlorperazine (COMPAZINE) 10 MG tablet Take 1 tablet (10 mg) by mouth every 6 hours as needed for nausea or vomiting (Patient not taking: Reported on 8/5/2024) 30 tablet 2     propranolol (INDERAL) 20 MG tablet Take 1 tablet (20 mg) by mouth 2 times daily 60 tablet 1     rivaroxaban ANTICOAGULANT (XARELTO) 20 MG TABS tablet Take 1 tablet (20 mg) by mouth daily (with dinner) 90 tablet 3     rosuvastatin (CRESTOR) 10 MG tablet Take 1 tablet (10 mg) by mouth daily. 90 tablet 2     semaglutide (OZEMPIC) 2 MG/3ML pen Inject 0.25 mg subcutaneously every 7 days. For 4 weeks then increase to 0.5 mg once weekly if tolerating 3 mL 0     No current facility-administered medications for this visit.     Ht 1.753 m (5' 9\")   Wt 96.2 kg (212 lb)   BMI 31.31 kg/m    Video physical exam  General: Patient appears well in no acute distress.   Skin: No visualized rash or lesions on visualized skin  Eyes: EOMI, no erythema, sclera icterus or discharge noted  Resp: Appears to be breathing comfortably without accessory muscle usage, speaking in full sentences, no cough  MSK: Appears to have " normal range of motion based on visualized movements  Neurologic: No apparent tremors, facial movements symmetric  Psych: affect bright, alert and oriented        Labs & Studies: I personally reviewed the following studies:  Most Recent 3 CBC's:  Recent Labs   Lab Test 11/06/24  1510 10/04/24  1506 08/28/24  1157   WBC 7.6 9.6 9.2   HGB 15.1 14.5 13.8   MCV 90 92 93    193 139*     Most Recent 3 BMP's:  Recent Labs   Lab Test 11/06/24  1510 10/04/24  1506 08/28/24  1157    136 142   POTASSIUM 3.8 3.8 4.0   CHLORIDE 103 99 105   CO2 25 24 25   BUN 17.0 13.2 13.0   CR 0.67 0.75 0.73   ANIONGAP 13 13 12   TORY 9.4 9.5 8.9   * 419* 417*    Most Recent 2 LFT's:  Recent Labs   Lab Test 11/06/24  1510 10/04/24  1506   AST 13 20   ALT 19 29   ALKPHOS 118 105   BILITOTAL 0.2 0.3    Most Recent TSH and T4:  Recent Labs   Lab Test 09/12/22  1642   TSH 2.56        ASSESSMENT AND PLAN:  Stage IV NSCLC, Rt lung adenocarcinoma with metastasis to pleura, mediastinum , rt pleural effusion and brain diagnosed 1/2022 (AJCC 8th edition)  PD-L1 TPS 2-3% by [place]   NGS [facility] panel-EML4:ALK rearragement; chr2:[id number], chr2:[id number]  NGS [facility]- GNAS R201H, KRAS K5E- No ALK    He began Alectinib 600 mg BID 3/2/22 and unfortunately developed grade 3 myalgias  requiring dose reduction to 300 mg BID.   In Dec 2022,we stopped drug 12/20/22 due to unremitting arthalgias, eventully had to starte PO steroids which led to improvement and resolved. Radiographicaly, he has had a near CR to Rx in the lung, has a residual rt LL lesion.     Began brigatinib 2/22/23 (delayed due to pt hesitancy). Developed severe cough on day 2 so brigatinib was held and cough resolved with in 24-48 hours. He restarted 60 mg daily and upped it to 90 mg.Restging CT showing stable disease in the lung, however, MRI with several contrast enhancement and increase in size of previously treated lesions. His dose was increased to 180 mg daily to  address possible CNS disease progression and started on dexamethasone. 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 Restaging CT in 9/2023 showing overall disease stablity with no evidence of active cancer. 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  PET/CT after these three months showed oligoperisstent disease with a single focus of active malignancy in the RLL. Met with Thoracic surgery 12/2023 to discuss resection but they recommended against it due to risk for scarring after. 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 MOst recent CT showing stable disease but brain MRI showing several possible new lesions and enlargement of exissting lesion (see below).     Switched Rx to Lorlatinib due to good CNS penetration. Now after about 4 months of therapy, has developed grade 3 fatigue and arthralgias/myalgias.  Discussed taking 1 week off and then resuming at 75 mg daily.     Repeat MRI end of August (6 weeks on Rx) with overall positive response, but a few enlarging and edematous lesions. On discussionw ith Dr. Arango this was though to be Rx related changes and plan to to next MRI in 3 months. He missed his CT CAP and we will schedule both in the upcoming weeks.     He has started ozempic and his BG are improved (reported around ~100).  He is very compliant with his DM medications. He hasn't been taking his BP often but did last week once and it was 150/90 (he couldn't remember exactly).     He is disabled by the fatigue/muscle aches,  pain meds aren't helping.  He is gong to apply for disability but strongly desires to keep working.  Discussed balancing side effects of drug and willing to try a lower dose for a better QOL.      Plan  Hold lorlatinibx one week  Restart 75 mg  daily next week  Labs + EKG early Dec  CT and brain MRI and Dr Persaud (he is over due so anytime in the upcoming ~4 weeks is fine)      #syncopal episodes: reviewed hospital notes with extensive work up. suspected to be vasovagal due to low pressures. No further episodes.He had ~2 last week- encouraged to check his BP daily      # Brain mets:   # Radiation necrosis,   -Baseline Brain MRI with several brain mets, s/p GK to 11-12 brain mets. F/u Brain MRI in June 2022 was showing enlargement of the one of the lesions along with edema, therefore had to undergo craniotomy followed by resection, the final biopsy consistent with radiation necrosis.   -received two doses bevacizumab for radiation necrosis in Fall 2022, tolerated poorly with HTN urgency and PE.)   -MRI in 4/2023 now showing contrast enhancement of lesions and a dominate lesion in the R frontal region with edema, several other smaller lesion. Short term MRI showing increase in size of the rt frontal lesion, underwent resection, patho again showing radiation necrosis. Restarted bevacizumab, which resulted in improved radiation necrosis / vasogenic edema on imaging in 10/2023 but ultimately was stopped due to uncontrolled HTN, last dose being 11/2023  -MRI imaging 1/2024 with stable disease and no edema.  -Brain MRI 3/2024 cancelled due to lapse in medical insurance.  -MRI 6/2024 showing showing several possible new lesions and enlargement of exissting lesion, however I am unsure if this is due to better imaging (perfusion MRI this time). Reviewed with rad onc and elected for short term MRI scan following initiation of lorlatinib, hold off on GK.  -MRI 8/28/24 with overall positive treatment response from rx but Two metastases (right parietal, left cerebellar) appears slightly enlarged, and Increased vasogenic edema associated with right temporal and left cerebellar metastases.    9/4/24. Dr. Arango reviewed brain MRI and feels ongoing surveillance for now  is adequate. R parietal lesion overall stable compared to April 2023 MRI and while L cerebellar is more pronounced, it is stable in size and in prior treatment.   Due for repeat brain MRI     #HTN: continue lisinopril and propranolol, norvasc to 10 mg    #hypercholesteremia   Recently Increased rosuvastatin from 5 to 10 mg; confirmed he is taking, Tryglycierides increasing, f/unit(s) pcholestrol in 3 months    #Right pleuritic/chest pain  Felt to be 2/2 prior pleurex drain.   -On methadone and oxycodone; followed by Palliative Care        #Diabetes, Type 2  Hyperglycemia on labs. Compliant with insulin but not very adherent to diet. Started on metformin , and insulin. Now been on 4 weeks of ozempic    #PE: provoked by malignancy vs bevacizumab in 11/2022  -- on rivaroxiabn 20 mg daily    #Grade 2-3 arthralgias    All joints affected, no morning stiffness, normal ESR and CRP  -felt to be 2/2 treatment.  Worse as of late, see above    Stella Navarro PA-C  40 minutes spent on the date of the encounter doing chart review, review of test results, interpretation of tests, patient visit, and documentation     The longitudinal plan of care for the diagnosis(es)/condition(s) as documented were addressed during this visit. Due to the added complexity in care, I will continue to support Connor in the subsequent management and with ongoing continuity of care.           Again, thank you for allowing me to participate in the care of your patient.        Sincerely,        Sarina Navarro PA-C

## 2024-11-19 NOTE — PROGRESS NOTES
Virtual Visit Details    Type of service:  Video Visit   Video Start Time: 7:57 AM  Video End Time: 8:20    Originating Location (pt. Location): Home    Distant Location (provider location):  Off-site  Platform used for Video Visit: [facility] ONCOLOGY FOLLOW UP NOTE    PATIENT NAME: Connor Emerson  ENCOUNTER DATE: Nov 19, 2024        Care Team  Primary Oncologist: Bassam Persaud MD    REASON FOR CURRENT VISIT: F/u of lung cancer    HISTORY OF PRESENT ILLNESS:  Mr. Connor Emerson is a 44 year old  male who is a non-smoker with PMHx of T2DM, HTN with metastatic NSCLC comes for follow up     Oncologic Hx:    Diagnosis:   Stage IV NSCLC, Rt lung adenocarcinoma with metastasis to pleura, mediastinum , rt pleural effusion and brain diagnosed 1/2022 (AJCC 8th edition)  PD-L1 TPS 2-3% by [place]   NGS [facility] panel-EML4:ALK rearragement  NGS Guardant- GNAS R201H, KRAS K5E- No ALK    Treatment:   7/10/24-current: Lolatinib 100 mg daily    2/23/2022- 6/2024: Brigatinib. Dose reduced to 60 mg due to cough after two days of 90 mg daily -->back on 90 mg---> increased to 180 mg  ---> settled on 120 mg    6/27/23- Right stealth craniotomy and resection of tumor:     7/20/23- 11/14/24: Bevacizumab for radiation necrosis. Discontinued due to severe HTN.    Past:  2/15/22- GK to 11 brain lesions  3/2/22- 12/2022 Alectinib 300 mg BID (Dose reduced to 450 mg BID from 600 mg BID due to grade 3 myalgias 3/21/22, again reduced to 300 mg BID 9/28/22)  6/28/22- Craniotomy, resection  9/28/22-10/26- Bevacizumab for radiation necrosis (stopped due to PE)      Intent of treatment: Palliative    Oncologic course:  1/19/22 to 1/22/22-Admitted to [facility] for 2 week progressive SOB secondary to have large rt sided pleural effusion, needing thoracentesis x2 (1.7L and 2.0 L removed), cytology positive for malignancy, adenocarcinoma.   1/26/22- Rt pleural mass biopsy-Dr. Agrawal--POSITIVE FOR ADENOCARCINOMA CONSISTENT WITH LUNG PRIMARY, admixed  with mesothelial hyperplasia and inflammatory infiltrate (+ TTF-1 and CK 7;  negative  p40, calretinin and WT-1. PAX8 immunostain focal +). 4th thoracentesis done simultaneously - 3L approx removed.   2/1/22- PET/CT-Right lower lobe central infiltrative FDG avid 8.2 x 9.6 cm mass representing a primary lung adenocarcinoma. Ipsilateral right perihilar, bilateral pretracheal, subcarinal and superior mediastinal michele metastases. Contralateral mildly FDG avid few lung nodules are suspicious for contralateral metastasis. At least 3 intracranial metastases in the right frontal lobe, left frontal lobe and left cerebellar hemisphere. Nonspecific mild diffuse bone marrow uptake. Further evaluation with a spine MRI could be considered to rule out early marrow infiltration. This could also be seen with red marrow conversion.  2/5/22-  Brain MRI- At least 9 intracranial metastases as detailed above. The dominant lesions involving the orbital right frontal lobe, the posterior left middle frontal gyrus, anterior right temporal lobe and in the left cerebellar hemisphere have surrounding moderate vasogenic type edema.  2/15/22- Saw Dr. Arango from Rad Onc- Rcd GK to 12 lesion in bran  2/16/22- Pleurex placement   3/2/22- Started Alectinib 600 mg BID  3/21/22- Dose reduced to 450 mg BID due to grade 3 myalgias and fatigue  4/2/22 to 4/5/22- Admitted at [facility] for- Severe sepsis due to MSSA infection of right PleurX catheter s/p removal- He presented with onset of pain at tube site starting 4/1; at arrival was tachycardic with leukocytosis (22.7) and elevated lactic acid (2.9).  CT chest showed fluid and stranding tracking outside the pleural space into chest wall along pleural catheter.  IR was consulted and removed catheter 4/2 with report of pustular drainage and tip culture growing MSSA.  Thoracic Surg was consulted who felt no surgical indication necessary given minimal pleural fluid and lack of any signs of abscess.   Initially treated with broad spectrum coverage for sepsis, narrowed to Ancef once sensitivities returned with plan to transition to cefadroxil for an additional 10 days at discharge per ID. Held drug 4/2 to 4/11 5/2/22- CT CAP- Overall, positive response to therapy with decreased size of right lower lobe and right pleural-based masses, pulmonary metastases, hilar and mediastinal lymphadenopathy. However, a single right posterior pleural-based mass has slightly increased in size since 2/24/2022. No metastatic disease in the abdomen and pelvis. Right Pleurx catheter has been removed. Trace right pleural effusion and right basilar atelectasis.  5/2/22- Brain MRI- The previously demonstrated brain metastases are mildly diminished in size versus to 2/5/2022. The degree of edema is also diminished but not completely resolved. Probable trace amounts of intralesional bleeding demonstrated on the gradient sequence within the metastases. No definite new metastasis or progressive mass effect. No hydrocephalus or infarct.    6/15/22 to 6/17/22- Admitted at [facility]-with aphasia and word finding difficulty over last few weeks.  He presented to [facility] ED on 6/10 for evaluation of his symptoms. MRI brain showed multiple intracranial metastases, with interval enlargement of the dominant lesion within the left frontal lobe and increased surrounding vasogenic edema with 2 mm rightward shift of the septum pellucidum. Due to his worsening anxiety, he left AMA. His symptoms continued to progress to where he could not write at work so he decided to go to the ED for re-evaluation and treatment. Evaluated by NSGY, Rad Onc (radiaiton necrosis vs tumor progression).  6/16/22- MR Brain (6/16) shows multiple intracranial metastases, with interval enlargement  of the dominant lesion within the left frontal lobe and increased surrounding vasogenic edema with 2 mm rightward shift of the septum pellucidum.  6/16/22- - CT CAP shows slightly decreased  size of right lower lobe and right pleural-based masses. No new pulmonary nodules or lymphadenopathy; No evidence of metastatic disease in the abdomen or pelvis.   6/28/22 to 6/30/22- Admitted at [facility]- Elective left Stealth craniotomy with resection of brain tumor due to ongoing symptoms. No intraoperative complications. EBL 50 ml.  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  7/5/22- Ct CAP- Right lower lobe low-density nodules are not significantly changed. A small left upper lobe pulmonary nodule is also unchanged. Trace pleural fluid on the right has increased slightly. No convincing evidence for metastatic disease in the abdomen or pelvis.  7/18/22 to 7/19/22- Admitted to [facility] for seizure- Reportedly was only taking once Keppra instead of twice daily. Also resumed on dexamethasone 2 mg daily  8/1/22- Brain MRI- Redemonstrated postsurgical changes status post left frontoparietal Craniotomy. Interval increase in size of the dominant ring-enhancing lesion in the left posterior superior frontal lobe with increased moderate surrounding vasogenic edema and local mass effect resulting in narrowing of the supratentorial ventricular system. No significant midline shift/herniation at this time    8/1/22- Dex was increased to 4 mg daily by Dr. Moran    9/1/22- CT Chest- Near resolution of previously seen right pleural nodule. Stable right lower lobe pulmonary nodule    9/28/22- Bevacizumab for radiation necrosis    10/26/22- Bevacizumab     10/26/22- Ct CAP- Stable posterior medial right lower lobe 1.9 x 1.1 cm nodule series 8 image 176. Adjacent stable scarring and atelectasis. The previously noted pleural nodule posteriorly on the right is not currently clearly identified. Stable left upper lobe 0.3 cm nodule image 56    10/26/22- Brain MRI- Overall improved appearance of multiple intracranial metastases with near resolution of associated edema and diminished enhancement and size of multiple residual lesions.  No definite new or progressive metastasis.    11/7/22 to 11/9/22- Admitted for PE and HTN urgency- Small pulmonary embolism in the right lower lobe pulmonary artery. started on Lovenox, Brain MRI neg for PRES.    12/29/22- ED visit- bilateral hip pain, pain in shoulders, knuckles, knees, and ankles- holding alectinib since 12/20/22 1/6/23- Ct CAP- Mild groundglass nodularity in the left upper lobe is new since the previous exam, and may be infectious in etiology. No other significant interval change. Pulmonary nodules are not significantly changed.    1/6/23- Brain MRI- Stable to diminished sequelae of intracranial metastasis and treatment changes. No new or progressive metastasis. No superimposed acute intracranial finding.     2/23/2022- Start Brigatinib. Dose reduced to 60 mg due to cough after two days of 90 mg daily -  3/23/23-Brigatinib  (increase to 90 mg)    4/20/23- CT CAP- Stable- Previously noted mild groundglass nodularity in the left upper lobe has resolved.Pulmonary nodules are unchanged.Trace amount pleural fluid on the right has decreased slightly.    4/20/23- Brain MRI- Possile progression- Largest metastasis within the right frontal lobe has increased in size with worsening peripheral nodular enhancement and worsening vasogenic edema contributing to new right to left midline shift.  Multiple new/enlarging metastases scattered throughout the cerebral hemispheres and cerebellum. Started on dexamethasone by NGS on 4/28/23 5/17/23- presents to clinic with glucose 627, admission to hospital for stability on insulin drip    6/16/23- Brain MRI- Interval increase in size of the necrotic lesion in the anterior aspect of the right frontal lobe from 2.4 x 2.3 x 2.4 cm previously to 3.0 x 3.5 x 2.8 cm on the current study. Mass effect due to slightly increased vasogenic edema surrounding the right frontal lesion resulted in increase of leftward midline shift of the anterior interhemispheric fissure from  0.7 cm previously to 0.9 cm currently. Interval increase in size in two adjacent metastases at the frontoparietal junction on the left. The remaining scattered intra-axial enhancing nodules are not changed appreciably in size or appearance since the comparison study.No evidence for acute intracranial pathology.   Dr. Moran- Due to worsening headaches and more problems with walking. Recommended a right Stealth craniotomy for resection of the lesion.     6/27/23- Right stealth craniotomy and resection of tumor: Final path: radiation necrosis    7/10/23- Ct CAP- stable Stable exam without evidence for new disease.Stable pulmonary nodules including a dominant nodular opacity at the right lower lobe.    7/20/23- Bevacizumab for radiation necrosis    8/16/23- Bevacizumab     9/12/23- Ct CAP-  Stable right lower lobe dominant nodular opacity. No new disease in the chest, abdomen and pelvis.     9/15, 10/6, 10/27, 11/17-  Bevacizumab    10/25/23- MRI Brain- 1. Redemonstrated postoperative changes of right frontal craniotomy. Decreased size of the right frontal resection cavity with significant decrease in the surrounding right frontal lobe vasogenic edema seen on the previous study. Mass effect has essentially resolved in the interim and there is no longer any midline shift. In the interim, slightly increased thickness of a rind of peripheral nodular enhancement along the posterior inferior and medial aspects of the right frontal lobe resection cavity, indeterminate. There is no significant elevated cerebral blood volume in this area. The findings may represent evolving posttreatment changes; however, residual tumor is not excluded.  Stable postoperative changes status post left anterior parietal craniotomy with irregular enhancement in the left frontal lobe parenchyma underlying the resection cavity, likely due to posttreatment change. Other scattered supratentorial and infratentorial metastatic lesions are overall similar  "to slightly decreased in size, as described.    12/5/23- PET/CT- with sole site of disease in the RLL measuring 1.6 x 1.4 cm and with SUV max of 4.2. No other sites of disease. His case was reviewed at tumor board and he met with Dr. Fu 12/14/24 with recommendations against surgical resection due to risk of significant pleural scarring. Continued on brigatinib 120 mg daily.     1/29/24 Brain MRI: No new metastatic lesions. No significant change in supratentorial and infratentorial subcentimeter metastatic enhancing lesions. Stable right inferior frontal and left high frontal postsurgical changes.     12/22-current: lost to follow up due to insurance issues    3/15/24- CT chest- Stable nodule medial right lower lobe. No metastatic disease demonstrated.    6/21/24- CT CAP- stable    6/26/24- Brain MRI- Numerous new and increased size of intracranial metastatic lesions. Increased nodular enhancement about the medial aspect of the right frontal lobe resection margin with increased adjacent T2/FLAIR hyperintense signal although without elevated cerebral blood volume could represent posttreatment changes although disease progression could have a similar appearance.  Stable left frontal lobe resection changes with stable underlying curvilinear enhancement.    7/10/24: start lorlatinib 100 mg daily    7/23/24-7/25/24: [facility] with syncopal episodes, suspected to be vasovagal due to low pressures      Interval Hx:  Seeing Connor in via video for follow up. He is at work at the time of the call. He missed his CT and other follow up.   He is on lorlatinib. He says he continues to have muscle aches and without the pain meds it is hard to manage. He is unable to sustain working with this level of severe pain and weakness in his body.  He feels he just \"ran a marathon.\"   He is exhausted, he cannot work and he is thinking about doing disability.  He drags all day in severe pain and weakness.  Its unbearable. Usually around " "noon-- things \"loosen up\" and he has less pain in the afternoon.  In bed by 9 pm - no napping.   He is taking methadone daily and oxycodone 3-4/day.   Friday- had had syncope- felt weak, sat down and passed out.  This occurred 2 x last week.  Not similar to July episodes. He is aware that he is lightheaded and sits but   BG- has been mid 100s, okay  BP- slightly high but ok 160/90's    Takes rosuvastatin, blood thinner, compliant with insulin/DM medications  He has not yet f/up with PCP for new DM meds.  Started Ozempic - has caused some diarrhea, but minor at this point.     Has had blurry vision, no headaches. Hands/feet tingling at bedtime, this is new. No balance issues. Also noted edema of the hands/feet.     ECOG PS 2    REVIEW OF SYSTEMS: 14 point ROS negative other than the symptoms noted above in the HPI.    Wt Readings from Last 4 Encounters:   11/19/24 96.2 kg (212 lb)   11/15/24 99.8 kg (220 lb)   10/07/24 97.5 kg (215 lb)   08/30/24 98.4 kg (217 lb)      Review of Systems:  A comprehensive ROS was performed and found to be negative or non-contributory with the exception of that noted in the HPI above.    Past Medical History:  GERD  Hypertension, not on medication  Type 2 diabetes mellitus, not on medications currently, previously on Metformin    Past Surgical History:  Past Surgical History:   Procedure Laterality Date    BRONCHOSCOPY RIGID OR FLEXIBLE W/TRANSENDOSCOPIC ENDOBRONCHIAL ULTRASOUND GUIDED Bilateral 1/26/2022    Procedure: Right BRONCHOSCOPY, FIBEROPTIC, endobronchial ultrasound, pleural biopsy;  Surgeon: Dallin Agrawal MD;  Location: [facility] OR    INJECT BLOCK MEDIAL BRANCH CERVICAL/THORACIC/LUMBAR      INSERT CHEST TUBE Right 2/16/2022    Procedure: INSERTION, CATHETER, INTERCOSTAL, FOR DRAINAGE;  Surgeon: Dallin Agrawal MD;  Location: [facility] GI    INSERT CHEST TUBE Right 3/9/2022    Procedure: INSERTION, CATHETER, INTERCOSTAL, FOR DRAINAGE;  Surgeon: Sushila Antonio MD;  Location: [facility] GI    IR " CHEST TUBE REMOVAL TUNNELED RIGHT  2022    OPTICAL TRACKING SYSTEM CRANIOTOMY, EXCISE TUMOR, COMBINED Left 2022    Procedure: Left stealth craniotomy for tumor resection with motor mapping;  Surgeon: Stephen Moran MD;  Location:  OR    OPTICAL TRACKING SYSTEM CRANIOTOMY, EXCISE TUMOR, COMBINED Right 2023    Procedure: Right stealth craniotomy and resection of tumor;  Surgeon: Stephen Moran MD;  Location:  OR    ORTHOPEDIC SURGERY      Ganesh. Rotator cuff repair.    PLEUROSCOPY N/A 2022    Procedure: Pleuroscopy with Pleural Biopsy;  Surgeon: Dallin Agrawal MD;  Location:  OR       Social History:  Lives with wife and 4 kids in [place]. Works as a  for an [employer] complex in [place]. Exposure to household chemicals and . No significant exposure to asbestos. No signal exposure to benzene or similar chemicals. No significant smoking history-states that he smoked 1 to 2 cigarettes occasionally per month for about 2 years in college, non-smoking since then. No significant alcohol use history. No other recreational substances. Good support system. Kids are 23, 19, 17 and 13.    Family History  Significant history for cancers on maternal side. Mother  of uterine cancer. 2 maternal uncles have possible metastatic melanoma.    Outpatient Medications:  Current Outpatient Medications   Medication Sig Dispense Refill    acetaminophen (TYLENOL) 325 MG tablet Take 325-650 mg by mouth every 6 hours as needed for mild pain      albuterol (PROAIR HFA/PROVENTIL HFA/VENTOLIN HFA) 108 (90 Base) MCG/ACT inhaler Inhale 2 puffs into the lungs every 6 hours as needed for shortness of breath, wheezing or cough (Patient not taking: Reported on 10/16/2024) 18 g 0    Alcohol Swabs PADS Use to swab the area of the injection or jabier as directed. Per insurance coverage 100 each 0    amLODIPine (NORVASC) 10 MG tablet Take 1 tablet (10 mg) by mouth daily. 90 tablet 0     blood glucose (NO BRAND SPECIFIED) lancets standard To use to test glucose level in the blood Use to test blood sugar  4  times daily as directed. To accompany glucose monitor brands per insurance coverage. 100 each 3    blood glucose (NO BRAND SPECIFIED) test strip To use to test glucose level in the blood Use to test blood sugar  4 times daily as directed. To accompany glucose monitor brands per insurance coverage. 100 strip 3    Blood Glucose Monitoring Suppl (ACCU-CHEK GUIDE) w/Device KIT       Continuous Glucose Sensor (FREESTYLE IRENE 3 SENSOR) MISC 1 each every 14 days. Use 1 sensor every 14 days. Use to read blood sugars per 's instructions. 6 each 3    cyclobenzaprine (FLEXERIL) 5 MG tablet Take 2 tablets (10 mg) by mouth nightly as needed for muscle spasms 30 tablet 4    DULoxetine (CYMBALTA) 30 MG capsule Take 1 capsule (30 mg) by mouth 2 times daily. 60 capsule 2    empagliflozin (JARDIANCE) 25 MG TABS tablet Take 1 tablet (25 mg) by mouth daily 90 tablet 1    FLUoxetine (PROZAC) 20 MG capsule Take 1 capsule (20 mg) by mouth daily. 30 capsule 1    insulin aspart (NOVOLOG PEN) 100 UNIT/ML pen Inject 0-40 Units Subcutaneous 3 times daily (before meals) Per discharge instructions sliding scale (Patient not taking: Reported on 8/5/2024) 45 mL 2    insulin glargine (LANTUS PEN) 100 UNIT/ML pen Inject 80 Units subcutaneously every morning. 30 mL 1    insulin pen needle (32G X 4 MM) 32G X 4 MM miscellaneous Use as directed by provider. Per insurance coverage 100 each 0    levETIRAcetam (KEPPRA) 1000 MG tablet Take 1 tablet (1,000 mg) by mouth 2 times daily 60 tablet 11    lisinopril (ZESTRIL) 40 MG tablet Take 1 tablet (40 mg) by mouth daily 30 tablet 1    lorlatinib (LORBRENA) 100 MG Take 1 tablet (100 mg) by mouth daily 30 tablet 0    metFORMIN (GLUCOPHAGE) 500 MG tablet Take 1 tablet (500 mg) by mouth 2 times daily (with meals) 60 tablet 3    methadone (DOLOPHINE) 5 MG tablet Take 1 tablet (5 mg)  "by mouth 2 times daily. 60 tablet 0    methylphenidate (RITALIN) 5 MG tablet Take 1-2 tablets (5-10 mg) by mouth 2 times daily as needed (fatigue). Take second dose by 12 noon, if it is needed 90 tablet 0    naloxone (NARCAN) 4 MG/0.1ML nasal spray Spray 1 spray (4 mg) into one nostril alternating nostrils as needed for opioid reversal every 2-3 minutes until assistance arrives (Patient not taking: Reported on 9/4/2024) 0.2 mL 3    oxyCODONE IR (ROXICODONE) 10 MG tablet Take 1 tablet (10 mg) by mouth every 3 hours as needed for moderate pain. 120 tablet 0    prochlorperazine (COMPAZINE) 10 MG tablet Take 1 tablet (10 mg) by mouth every 6 hours as needed for nausea or vomiting (Patient not taking: Reported on 8/5/2024) 30 tablet 2    propranolol (INDERAL) 20 MG tablet Take 1 tablet (20 mg) by mouth 2 times daily 60 tablet 1    rivaroxaban ANTICOAGULANT (XARELTO) 20 MG TABS tablet Take 1 tablet (20 mg) by mouth daily (with dinner) 90 tablet 3    rosuvastatin (CRESTOR) 10 MG tablet Take 1 tablet (10 mg) by mouth daily. 90 tablet 2    semaglutide (OZEMPIC) 2 MG/3ML pen Inject 0.25 mg subcutaneously every 7 days. For 4 weeks then increase to 0.5 mg once weekly if tolerating 3 mL 0     No current facility-administered medications for this visit.     Ht 1.753 m (5' 9\")   Wt 96.2 kg (212 lb)   BMI 31.31 kg/m    Video physical exam  General: Patient appears well in no acute distress.   Skin: No visualized rash or lesions on visualized skin  Eyes: EOMI, no erythema, sclera icterus or discharge noted  Resp: Appears to be breathing comfortably without accessory muscle usage, speaking in full sentences, no cough  MSK: Appears to have normal range of motion based on visualized movements  Neurologic: No apparent tremors, facial movements symmetric  Psych: affect bright, alert and oriented        Labs & Studies: I personally reviewed the following studies:  Most Recent 3 CBC's:  Recent Labs   Lab Test 11/06/24  1510 " 10/04/24  1506 08/28/24  1157   WBC 7.6 9.6 9.2   HGB 15.1 14.5 13.8   MCV 90 92 93    193 139*     Most Recent 3 BMP's:  Recent Labs   Lab Test 11/06/24  1510 10/04/24  1506 08/28/24  1157    136 142   POTASSIUM 3.8 3.8 4.0   CHLORIDE 103 99 105   CO2 25 24 25   BUN 17.0 13.2 13.0   CR 0.67 0.75 0.73   ANIONGAP 13 13 12   TORY 9.4 9.5 8.9   * 419* 417*    Most Recent 2 LFT's:  Recent Labs   Lab Test 11/06/24  1510 10/04/24  1506   AST 13 20   ALT 19 29   ALKPHOS 118 105   BILITOTAL 0.2 0.3    Most Recent TSH and T4:  Recent Labs   Lab Test 09/12/22  1642   TSH 2.56        ASSESSMENT AND PLAN:  Stage IV NSCLC, Rt lung adenocarcinoma with metastasis to pleura, mediastinum , rt pleural effusion and brain diagnosed 1/2022 (AJCC 8th edition)  PD-L1 TPS 2-3% by [place]   NGS [facility] panel-EML4:ALK rearragement; chr2:[id number], chr2:[id number]  NGS Guardant- GNAS R201H, KRAS K5E- No ALK    He began Alectinib 600 mg BID 3/2/22 and unfortunately developed grade 3 myalgias  requiring dose reduction to 300 mg BID.   In Dec 2022,we stopped drug 12/20/22 due to unremitting arthalgias, eventully had to starte PO steroids which led to improvement and resolved. Radiographicaly, he has had a near CR to Rx in the lung, has a residual rt LL lesion.     Began brigatinib 2/22/23 (delayed due to pt hesitancy). Developed severe cough on day 2 so brigatinib was held and cough resolved with in 24-48 hours. He restarted 60 mg daily and upped it to 90 mg.Restging CT showing stable disease in the lung, however, MRI with several contrast enhancement and increase in size of previously treated lesions. His dose was increased to 180 mg daily to address possible CNS disease progression and started on dexamethasone. 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 Restaging CT in 9/2023 showing  overall disease stablity with no evidence of active cancer. 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  PET/CT after these three months showed oligoperisstent disease with a single focus of active malignancy in the RLL. Met with Thoracic surgery 12/2023 to discuss resection but they recommended against it due to risk for scarring after. 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 MOst recent CT showing stable disease but brain MRI showing several possible new lesions and enlargement of exissting lesion (see below).     Switched Rx to Lorlatinib due to good CNS penetration. Now after about 4 months of therapy, has developed grade 3 fatigue and arthralgias/myalgias.  Discussed taking 1 week off and then resuming at 75 mg daily.     Repeat MRI end of August (6 weeks on Rx) with overall positive response, but a few enlarging and edematous lesions. On discussionw ith Dr. Arango this was though to be Rx related changes and plan to to next MRI in 3 months. He missed his CT CAP and we will schedule both in the upcoming weeks.     He has started ozempic and his BG are improved (reported around ~100).  He is very compliant with his DM medications. He hasn't been taking his BP often but did last week once and it was 150/90 (he couldn't remember exactly).     He is disabled by the fatigue/muscle aches,  pain meds aren't helping.  He is gong to apply for disability but strongly desires to keep working.  Discussed balancing side effects of drug and willing to try a lower dose for a better QOL.      Plan  Hold lorlatinibx one week  Restart 75 mg daily next week  Labs + EKG early Dec  CT and brain MRI and Dr Persaud (he is over due so anytime in the upcoming ~4 weeks is fine)      #syncopal episodes: reviewed hospital notes with extensive work up. suspected to be vasovagal due to low pressures. No further episodes.He had ~2  last week- encouraged to check his BP daily      # Brain mets:   # Radiation necrosis,   -Baseline Brain MRI with several brain mets, s/p GK to 11-12 brain mets. F/u Brain MRI in June 2022 was showing enlargement of the one of the lesions along with edema, therefore had to undergo craniotomy followed by resection, the final biopsy consistent with radiation necrosis.   -received two doses bevacizumab for radiation necrosis in Fall 2022, tolerated poorly with HTN urgency and PE.)   -MRI in 4/2023 now showing contrast enhancement of lesions and a dominate lesion in the R frontal region with edema, several other smaller lesion. Short term MRI showing increase in size of the rt frontal lesion, underwent resection, patho again showing radiation necrosis. Restarted bevacizumab, which resulted in improved radiation necrosis / vasogenic edema on imaging in 10/2023 but ultimately was stopped due to uncontrolled HTN, last dose being 11/2023  -MRI imaging 1/2024 with stable disease and no edema.  -Brain MRI 3/2024 cancelled due to lapse in medical insurance.  -MRI 6/2024 showing showing several possible new lesions and enlargement of exissting lesion, however I am unsure if this is due to better imaging (perfusion MRI this time). Reviewed with rad onc and elected for short term MRI scan following initiation of lorlatinib, hold off on GK.  -MRI 8/28/24 with overall positive treatment response from rx but Two metastases (right parietal, left cerebellar) appears slightly enlarged, and Increased vasogenic edema associated with right temporal and left cerebellar metastases.    9/4/24. Dr. Arango reviewed brain MRI and feels ongoing surveillance for now is adequate. R parietal lesion overall stable compared to April 2023 MRI and while L cerebellar is more pronounced, it is stable in size and in prior treatment.   Due for repeat brain MRI     #HTN: continue lisinopril and propranolol, norvasc to 10 mg    #hypercholesteremia    Recently Increased rosuvastatin from 5 to 10 mg; confirmed he is taking, Tryglycierides increasing, f/unit(s) pcholestrol in 3 months    #Right pleuritic/chest pain  Felt to be 2/2 prior pleurex drain.   -On methadone and oxycodone; followed by Palliative Care        #Diabetes, Type 2  Hyperglycemia on labs. Compliant with insulin but not very adherent to diet. Started on metformin , and insulin. Now been on 4 weeks of ozempic    #PE: provoked by malignancy vs bevacizumab in 11/2022  -- on rivaroxiabn 20 mg daily    #Grade 2-3 arthralgias    All joints affected, no morning stiffness, normal ESR and CRP  -felt to be 2/2 treatment.  Worse as of late, see above    Stella Navarro PA-C  40 minutes spent on the date of the encounter doing chart review, review of test results, interpretation of tests, patient visit, and documentation     The longitudinal plan of care for the diagnosis(es)/condition(s) as documented were addressed during this visit. Due to the added complexity in care, I will continue to support Connor in the subsequent management and with ongoing continuity of care.

## 2024-11-20 ENCOUNTER — TELEPHONE (OUTPATIENT)
Dept: ONCOLOGY | Facility: CLINIC | Age: 44
End: 2024-11-20
Payer: COMMERCIAL

## 2024-11-20 NOTE — TELEPHONE ENCOUNTER
PA Initiation    Medication: LORBRENA 25 MG PO TABS  Insurance Company: [employer] - Phone [phone] Fax [phone]  Pharmacy Filling the Rx: [facility] - [place], [place] - [address]    Start Date: 11/20/2024        Juliette Marquez CPhT  [facility] and [facility]  Oncology Pharmacy Liaison II  [email or web address]  [phone] (phone  [phone] (fax

## 2024-11-21 ENCOUNTER — TELEPHONE (OUTPATIENT)
Dept: ONCOLOGY | Facility: CLINIC | Age: 44
End: 2024-11-21
Payer: COMMERCIAL

## 2024-11-21 DIAGNOSIS — C34.31 MALIGNANT NEOPLASM OF LOWER LOBE OF RIGHT LUNG (H): Primary | ICD-10-CM

## 2024-11-21 DIAGNOSIS — I67.89 RADIATION THERAPY INDUCED BRAIN NECROSIS: ICD-10-CM
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
## 2024-11-21 NOTE — TELEPHONE ENCOUNTER
Prior Authorization Approval    Medication: LORBRENA 25 MG PO TABS  Authorization Effective Date: 8/22/2024  Authorization Expiration Date: 11/20/2025  Approved Dose/Quantity: 90/30  Reference #: [id number]   Insurance Company: [employer] - Phone [phone] Fax [phone]  Expected CoPay: $ 0  Which Pharmacy is filling the prescription: [facility] - [place], [place] - [address]   Pharmacy Notified: yes            Juliette Marquez CPhT  [facility]  Oncology Pharmacy Liaison II  [email or web address]  [phone] (phone  [phone] (fax

## 2024-11-21 NOTE — TELEPHONE ENCOUNTER
Spoke to Connor to confirm dose reduction of Lorbrena from 100mg daily to 75mg (3 X25mg) daily. Pt aware, will start dose reduction next week when he receives med from [facility].     We will follow up in 1 week after pt restarts to see how he's doing.  Pt had no questions or concerns.    Pt has next appt on 12/16 with Dr. Persaud. I'll reach out to scheduling to add on monthly labs prior to the appt.     Hamida ChinchillaD  Hematology/Oncology Clinical Pharmacist  [facility]  [phone]    **The oral chemotherapy pharmacists are now working on provider-specific teams. Your pharmacist team can be reached at [facility] or [phone].**

## 2024-11-24 ENCOUNTER — MYC REFILL (OUTPATIENT)
Dept: PALLIATIVE CARE | Facility: CLINIC | Age: 44
End: 2024-11-24
Payer: COMMERCIAL

## 2024-11-24 ENCOUNTER — MYC REFILL (OUTPATIENT)
Dept: PHARMACY | Facility: CLINIC | Age: 44
End: 2024-11-24
Payer: COMMERCIAL

## 2024-11-24 DIAGNOSIS — Z98.890 S/P CRANIOTOMY: ICD-10-CM

## 2024-11-24 DIAGNOSIS — E11.9 TYPE 2 DIABETES MELLITUS WITHOUT COMPLICATION, WITH LONG-TERM CURRENT USE OF INSULIN (H): ICD-10-CM

## 2024-11-24 DIAGNOSIS — D49.6 BRAIN TUMOR (H): ICD-10-CM

## 2024-11-24 DIAGNOSIS — Z79.4 TYPE 2 DIABETES MELLITUS WITHOUT COMPLICATION, WITH LONG-TERM CURRENT USE OF INSULIN (H): ICD-10-CM

## 2024-11-25 RX ORDER — OXYCODONE HYDROCHLORIDE 10 MG/1
10 TABLET ORAL
Qty: 120 TABLET | Refills: 0 | Status: SHIPPED | OUTPATIENT
Start: 2024-11-27

## 2024-11-25 NOTE — TELEPHONE ENCOUNTER
Received Riot [employer] message from patient requesting refill of oxycodone.     Last refill: 11/16/24  Last office visit: 10/21/24  Scheduled for follow up 1/2/25     Will route request to MD/ for review.     Reviewed [place]  Report.

## 2024-11-25 NOTE — TELEPHONE ENCOUNTER
Clinic RN: Please investigate patient's chart or contact patient if the information cannot be found because the last prescription was a taper dose (not in RN protocol). We need to know what dose patient is taking. Determine the current dose, then route to provider and ask provider to update the SIG and approve or deny the prescription.

## 2024-11-26 ENCOUNTER — MYC REFILL (OUTPATIENT)
Dept: PHARMACY | Facility: CLINIC | Age: 44
End: 2024-11-26
Payer: COMMERCIAL

## 2024-11-26 DIAGNOSIS — E11.9 TYPE 2 DIABETES MELLITUS WITHOUT COMPLICATION, WITH LONG-TERM CURRENT USE OF INSULIN (H): ICD-10-CM

## 2024-11-26 DIAGNOSIS — Z79.4 TYPE 2 DIABETES MELLITUS WITHOUT COMPLICATION, WITH LONG-TERM CURRENT USE OF INSULIN (H): ICD-10-CM

## 2024-12-03 ENCOUNTER — MYC MEDICAL ADVICE (OUTPATIENT)
Dept: ONCOLOGY | Facility: CLINIC | Age: 44
End: 2024-12-03
Payer: COMMERCIAL

## 2024-12-03 NOTE — TELEPHONE ENCOUNTER
Nurse Triage SBAR    Situation: Pain to right side    Background:    DX: Lung Cancer  Provider:   Most recent appointment: 11/19/24 w/Sarina Navarro  Upcoming appointments: 12/11/24 MRI and CT scan, 12/16/24 labs and visit w/  Most recent treatment: Lorlatinib    Assessment:   Pt reporting pain that started over the weekend. Pain is located on his right side, from armpit to waist. Reports this area looks swollen. Pain is not constant. It hurts when he breaths in. Some tenderness around rib cage area with touch. Rates 5/10 and describes as dull ache. Has been taking oxycodone but has not provided relief. This worsened yesterday after removing snow and unsure if that made pain worse.     Has been having nausea the last couple of days, no vomiting.     Denies redness, F/C, cough, sore throat, SOB, problems with bowel/bladder, trauma to area    Recommendation:   Discussed applying heat to area to see if that offers relief. Can also try OTC pain meds. Informed pt writer would reach out to provider and call back with further recommendations.      1133 Secure message to Sarina Navarro    113 Stella recommending pt be seen in clinic for evaluation. Opening with Chelsea Villegas tomorrow.     1143 Call to pt and informed him of provider recommendation. Pt confirmed he is able to make 1115 appt in person with Chelsea tomorrow. Message sent to [facility] to have pt added to schedule.

## 2024-12-03 NOTE — PROGRESS NOTES
MEDICAL ONCOLOGY FOLLOW UP NOTE    PATIENT NAME: Connor Emerson  ENCOUNTER DATE: Dec 4, 2024        Care Team  Primary Oncologist: Bassam Persaud MD    REASON FOR CURRENT VISIT: F/u of lung cancer    HISTORY OF PRESENT ILLNESS:  Mr. Connor Emerson is a 44 year old  male who is a non-smoker with PMHx of T2DM, HTN with metastatic NSCLC comes for follow up     Oncologic Hx:    Diagnosis:   Stage IV NSCLC, Rt lung adenocarcinoma with metastasis to pleura, mediastinum , rt pleural effusion and brain diagnosed 1/2022 (AJCC 8th edition)  PD-L1 TPS 2-3% by [facility]   NGS [facility] panel-EML4:ALK rearragement  NGS [facility]- GNAS R201H, KRAS K5E- No ALK    Treatment:   7/10/24-current: Lolatinib 100 mg daily    2/23/2022- 6/2024: Brigatinib. Dose reduced to 60 mg due to cough after two days of 90 mg daily -->back on 90 mg---> increased to 180 mg  ---> settled on 120 mg    6/27/23- Right stealth craniotomy and resection of tumor:     7/20/23- 11/14/24: Bevacizumab for radiation necrosis. Discontinued due to severe HTN.    Past:  2/15/22- GK to 11 brain lesions  3/2/22- 12/2022 Alectinib 300 mg BID (Dose reduced to 450 mg BID from 600 mg BID due to grade 3 myalgias 3/21/22, again reduced to 300 mg BID 9/28/22)  6/28/22- Craniotomy, resection  9/28/22-10/26- Bevacizumab for radiation necrosis (stopped due to PE)      Intent of treatment: Palliative    Oncologic course:  1/19/22 to 1/22/22-Admitted to [facility] for 2 week progressive SOB secondary to have large rt sided pleural effusion, needing thoracentesis x2 (1.7L and 2.0 L removed), cytology positive for malignancy, adenocarcinoma.   1/26/22- Rt pleural mass biopsy-Dr. Agrawal--POSITIVE FOR ADENOCARCINOMA CONSISTENT WITH LUNG PRIMARY, admixed with mesothelial hyperplasia and inflammatory infiltrate (+ TTF-1 and CK 7;  negative  p40, calretinin and WT-1. PAX8 immunostain focal +). 4th thoracentesis done simultaneously - 3L approx removed.   2/1/22- PET/CT-Right lower lobe central  infiltrative FDG avid 8.2 x 9.6 cm mass representing a primary lung adenocarcinoma. Ipsilateral right perihilar, bilateral pretracheal, subcarinal and superior mediastinal michele metastases. Contralateral mildly FDG avid few lung nodules are suspicious for contralateral metastasis. At least 3 intracranial metastases in the right frontal lobe, left frontal lobe and left cerebellar hemisphere. Nonspecific mild diffuse bone marrow uptake. Further evaluation with a spine MRI could be considered to rule out early marrow infiltration. This could also be seen with red marrow conversion.  2/5/22-  Brain MRI- At least 9 intracranial metastases as detailed above. The dominant lesions involving the orbital right frontal lobe, the posterior left middle frontal gyrus, anterior right temporal lobe and in the left cerebellar hemisphere have surrounding moderate vasogenic type edema.  2/15/22- Saw Dr. Arango from [facility] Onc- Rcd GK to 12 lesion in bran  2/16/22- Pleurex placement   3/2/22- Started Alectinib 600 mg BID  3/21/22- Dose reduced to 450 mg BID due to grade 3 myalgias and fatigue  4/2/22 to 4/5/22- Admitted at [facility] for- Severe sepsis due to MSSA infection of right PleurX catheter s/p removal- He presented with onset of pain at tube site starting 4/1; at arrival was tachycardic with leukocytosis (22.7) and elevated lactic acid (2.9).  CT chest showed fluid and stranding tracking outside the pleural space into chest wall along pleural catheter.  IR was consulted and removed catheter 4/2 with report of pustular drainage and tip culture growing MSSA.  Thoracic Surg was consulted who felt no surgical indication necessary given minimal pleural fluid and lack of any signs of abscess.  Initially treated with broad spectrum coverage for sepsis, narrowed to Ancef once sensitivities returned with plan to transition to cefadroxil for an additional 10 days at discharge per ID. Held drug 4/2 to 4/11 5/2/22- CT CAP- Overall,  positive response to therapy with decreased size of right lower lobe and right pleural-based masses, pulmonary metastases, hilar and mediastinal lymphadenopathy. However, a single right posterior pleural-based mass has slightly increased in size since 2/24/2022. No metastatic disease in the abdomen and pelvis. Right Pleurx catheter has been removed. Trace right pleural effusion and right basilar atelectasis.  5/2/22- Brain MRI- The previously demonstrated brain metastases are mildly diminished in size versus to 2/5/2022. The degree of edema is also diminished but not completely resolved. Probable trace amounts of intralesional bleeding demonstrated on the gradient sequence within the metastases. No definite new metastasis or progressive mass effect. No hydrocephalus or infarct.    6/15/22 to 6/17/22- Admitted at [facility]-with aphasia and word finding difficulty over last few weeks.  He presented to [facility] ED on 6/10 for evaluation of his symptoms. MRI brain showed multiple intracranial metastases, with interval enlargement of the dominant lesion within the left frontal lobe and increased surrounding vasogenic edema with 2 mm rightward shift of the septum pellucidum. Due to his worsening anxiety, he left AMA. His symptoms continued to progress to where he could not write at work so he decided to go to the ED for re-evaluation and treatment. Evaluated by NSGY, Rad Onc (radiaiton necrosis vs tumor progression).  6/16/22- MR Brain (6/16) shows multiple intracranial metastases, with interval enlargement  of the dominant lesion within the left frontal lobe and increased surrounding vasogenic edema with 2 mm rightward shift of the septum pellucidum.  6/16/22- - CT CAP shows slightly decreased size of right lower lobe and right pleural-based masses. No new pulmonary nodules or lymphadenopathy; No evidence of metastatic disease in the abdomen or pelvis.   6/28/22 to 6/30/22- Admitted at [facility]- Elective left Stealth craniotomy  with resection of brain tumor due to ongoing symptoms. No intraoperative complications. EBL 50 ml.  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  7/5/22- Ct CAP- Right lower lobe low-density nodules are not significantly changed. A small left upper lobe pulmonary nodule is also unchanged. Trace pleural fluid on the right has increased slightly. No convincing evidence for metastatic disease in the abdomen or pelvis.  7/18/22 to 7/19/22- Admitted to [facility] for seizure- Reportedly was only taking once Keppra instead of twice daily. Also resumed on dexamethasone 2 mg daily  8/1/22- Brain MRI- Redemonstrated postsurgical changes status post left frontoparietal Craniotomy. Interval increase in size of the dominant ring-enhancing lesion in the left posterior superior frontal lobe with increased moderate surrounding vasogenic edema and local mass effect resulting in narrowing of the supratentorial ventricular system. No significant midline shift/herniation at this time    8/1/22- Dex was increased to 4 mg daily by Dr. Moran    9/1/22- CT Chest- Near resolution of previously seen right pleural nodule. Stable right lower lobe pulmonary nodule    9/28/22- Bevacizumab for radiation necrosis    10/26/22- Bevacizumab     10/26/22- Ct CAP- Stable posterior medial right lower lobe 1.9 x 1.1 cm nodule series 8 image 176. Adjacent stable scarring and atelectasis. The previously noted pleural nodule posteriorly on the right is not currently clearly identified. Stable left upper lobe 0.3 cm nodule image 56    10/26/22- Brain MRI- Overall improved appearance of multiple intracranial metastases with near resolution of associated edema and diminished enhancement and size of multiple residual lesions. No definite new or progressive metastasis.    11/7/22 to 11/9/22- Admitted for PE and HTN urgency- Small pulmonary embolism in the right lower lobe pulmonary artery. started on Lovenox, Brain MRI neg for PRES.    12/29/22- ED visit-  bilateral hip pain, pain in shoulders, knuckles, knees, and ankles- holding alectinib since 12/20/22 1/6/23- Ct CAP- Mild groundglass nodularity in the left upper lobe is new since the previous exam, and may be infectious in etiology. No other significant interval change. Pulmonary nodules are not significantly changed.    1/6/23- Brain MRI- Stable to diminished sequelae of intracranial metastasis and treatment changes. No new or progressive metastasis. No superimposed acute intracranial finding.     2/23/2022- Start Brigatinib. Dose reduced to 60 mg due to cough after two days of 90 mg daily -  3/23/23-Brigatinib  (increase to 90 mg)    4/20/23- CT CAP- Stable- Previously noted mild groundglass nodularity in the left upper lobe has resolved.Pulmonary nodules are unchanged.Trace amount pleural fluid on the right has decreased slightly.    4/20/23- Brain MRI- Possile progression- Largest metastasis within the right frontal lobe has increased in size with worsening peripheral nodular enhancement and worsening vasogenic edema contributing to new right to left midline shift.  Multiple new/enlarging metastases scattered throughout the cerebral hemispheres and cerebellum. Started on dexamethasone by NGS on 4/28/23 5/17/23- presents to clinic with glucose 627, admission to hospital for stability on insulin drip    6/16/23- Brain MRI- Interval increase in size of the necrotic lesion in the anterior aspect of the right frontal lobe from 2.4 x 2.3 x 2.4 cm previously to 3.0 x 3.5 x 2.8 cm on the current study. Mass effect due to slightly increased vasogenic edema surrounding the right frontal lesion resulted in increase of leftward midline shift of the anterior interhemispheric fissure from 0.7 cm previously to 0.9 cm currently. Interval increase in size in two adjacent metastases at the frontoparietal junction on the left. The remaining scattered intra-axial enhancing nodules are not changed appreciably in size or  appearance since the comparison study.No evidence for acute intracranial pathology.   Dr. Moran- Due to worsening headaches and more problems with walking. Recommended a right Stealth craniotomy for resection of the lesion.     6/27/23- Right stealth craniotomy and resection of tumor: Final path: radiation necrosis    7/10/23- Ct CAP- stable Stable exam without evidence for new disease.Stable pulmonary nodules including a dominant nodular opacity at the right lower lobe.    7/20/23- Bevacizumab for radiation necrosis    8/16/23- Bevacizumab     9/12/23- Ct CAP-  Stable right lower lobe dominant nodular opacity. No new disease in the chest, abdomen and pelvis.     9/15, 10/6, 10/27, 11/17-  Bevacizumab    10/25/23- MRI Brain- 1. Redemonstrated postoperative changes of right frontal craniotomy. Decreased size of the right frontal resection cavity with significant decrease in the surrounding right frontal lobe vasogenic edema seen on the previous study. Mass effect has essentially resolved in the interim and there is no longer any midline shift. In the interim, slightly increased thickness of a rind of peripheral nodular enhancement along the posterior inferior and medial aspects of the right frontal lobe resection cavity, indeterminate. There is no significant elevated cerebral blood volume in this area. The findings may represent evolving posttreatment changes; however, residual tumor is not excluded.  Stable postoperative changes status post left anterior parietal craniotomy with irregular enhancement in the left frontal lobe parenchyma underlying the resection cavity, likely due to posttreatment change. Other scattered supratentorial and infratentorial metastatic lesions are overall similar to slightly decreased in size, as described.    12/5/23- PET/CT- with sole site of disease in the RLL measuring 1.6 x 1.4 cm and with SUV max of 4.2. No other sites of disease. His case was reviewed at tumor board and he met with  Dr. Fu 12/14/24 with recommendations against surgical resection due to risk of significant pleural scarring. Continued on brigatinib 120 mg daily.     1/29/24 Brain MRI: No new metastatic lesions. No significant change in supratentorial and infratentorial subcentimeter metastatic enhancing lesions. Stable right inferior frontal and left high frontal postsurgical changes.     12/22-current: lost to follow up due to insurance issues    3/15/24- CT chest- Stable nodule medial right lower lobe. No metastatic disease demonstrated.    6/21/24- CT CAP- stable    6/26/24- Brain MRI- Numerous new and increased size of intracranial metastatic lesions. Increased nodular enhancement about the medial aspect of the right frontal lobe resection margin with increased adjacent T2/FLAIR hyperintense signal although without elevated cerebral blood volume could represent posttreatment changes although disease progression could have a similar appearance.  Stable left frontal lobe resection changes with stable underlying curvilinear enhancement.    7/10/24: start lorlatinib 100 mg daily    7/23/24-7/25/24: [facility] with syncopal episodes, suspected to be vasovagal due to low pressures      Interval Hx:  Seeing Connor in clinic for follow up--called Triage yesterday with increased R sided torso pain. 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 He does not have pain with palpation, but has pain with rotation of torso to the right. He tried heat last night with relief. This pain feels different than his chronic R pleuritic pain related to prior pleurex    He has been back on lorlatinib for about a week now at lower dose of 75 mg--tolerating this much better. Arthralgias/myalgias have not returned and his energy is overall ok. . Using methadone daily and oxycodone 3-4/day.   Friday- had had syncope- felt weak, sat down and passed out.  This occurred 2 x last week.   Not similar to July episodes.     On ozempic--does not check sugars. Reports he has been compliant with BP meds, but doesn't really check BP at home. Last time he did it was 140/80-something.     ECOG PS 2    REVIEW OF SYSTEMS: 14 point ROS negative other than the symptoms noted above in the HPI.    Wt Readings from Last 4 Encounters:   11/19/24 96.2 kg (212 lb)   11/15/24 99.8 kg (220 lb)   10/07/24 97.5 kg (215 lb)   08/30/24 98.4 kg (217 lb)      Review of Systems:  A comprehensive ROS was performed and found to be negative or non-contributory with the exception of that noted in the HPI above.    Past Medical History:  GERD  Hypertension, not on medication  Type 2 diabetes mellitus, not on medications currently, previously on Metformin    Past Surgical History:  Past Surgical History:   Procedure Laterality Date    BRONCHOSCOPY RIGID OR FLEXIBLE W/TRANSENDOSCOPIC ENDOBRONCHIAL ULTRASOUND GUIDED Bilateral 1/26/2022    Procedure: Right BRONCHOSCOPY, FIBEROPTIC, endobronchial ultrasound, pleural biopsy;  Surgeon: Dallin Agrawal MD;  Location: [facility] OR    INJECT BLOCK MEDIAL BRANCH CERVICAL/THORACIC/LUMBAR      INSERT CHEST TUBE Right 2/16/2022    Procedure: INSERTION, CATHETER, INTERCOSTAL, FOR DRAINAGE;  Surgeon: Dallin Agrawal MD;  Location: [facility] GI    INSERT CHEST TUBE Right 3/9/2022    Procedure: INSERTION, CATHETER, INTERCOSTAL, FOR DRAINAGE;  Surgeon: Sushila Antonio MD;  Location: [facility] GI    IR CHEST TUBE REMOVAL TUNNELED RIGHT  4/2/2022    OPTICAL TRACKING SYSTEM CRANIOTOMY, EXCISE TUMOR, COMBINED Left 6/28/2022    Procedure: Left stealth craniotomy for tumor resection with motor mapping;  Surgeon: Stephen Moran MD;  Location:  OR    OPTICAL TRACKING SYSTEM CRANIOTOMY, EXCISE TUMOR, COMBINED Right 6/27/2023    Procedure: Right stealth craniotomy and resection of tumor;  Surgeon: Stephen Moran MD;  Location:  OR    ORTHOPEDIC SURGERY      Ganesh. Rotator cuff repair.    PLEUROSCOPY N/A 1/26/2022     Procedure: Pleuroscopy with Pleural Biopsy;  Surgeon: Dallin Agrawal MD;  Location: [facility] OR       Social History:  Lives with wife and 4 kids in [place]. Works as a  for an apartment complex in [place]. Exposure to household chemicals and . No significant exposure to asbestos. No signal exposure to benzene or similar chemicals. No significant smoking history-states that he smoked 1 to 2 cigarettes occasionally per month for about 2 years in college, non-smoking since then. No significant alcohol use history. No other recreational substances. Good support system. Kids are 23, 19, 17 and 13.    Family History  Significant history for cancers on maternal side. Mother  of uterine cancer. 2 maternal uncles have possible metastatic melanoma.    Outpatient Medications:  Current Outpatient Medications   Medication Sig Dispense Refill    acetaminophen (TYLENOL) 325 MG tablet Take 325-650 mg by mouth every 6 hours as needed for mild pain      albuterol (PROAIR HFA/PROVENTIL HFA/VENTOLIN HFA) 108 (90 Base) MCG/ACT inhaler Inhale 2 puffs into the lungs every 6 hours as needed for shortness of breath, wheezing or cough (Patient not taking: Reported on 10/16/2024) 18 g 0    Alcohol Swabs PADS Use to swab the area of the injection or jabier as directed. Per insurance coverage 100 each 0    amLODIPine (NORVASC) 10 MG tablet Take 1 tablet (10 mg) by mouth daily. 90 tablet 0    blood glucose (NO BRAND SPECIFIED) lancets standard To use to test glucose level in the blood Use to test blood sugar  4  times daily as directed. To accompany glucose monitor brands per insurance coverage. 100 each 3    blood glucose (NO BRAND SPECIFIED) test strip To use to test glucose level in the blood Use to test blood sugar  4 times daily as directed. To accompany glucose monitor brands per insurance coverage. 100 strip 3    Blood Glucose Monitoring Suppl (ACCU-CHEK GUIDE) w/Device KIT       Continuous Glucose  Sensor (FREESTYLE IRENE 3 SENSOR) [facility] 1 each every 14 days. Use 1 sensor every 14 days. Use to read blood sugars per 's instructions. 6 each 3    cyclobenzaprine (FLEXERIL) 5 MG tablet Take 2 tablets (10 mg) by mouth nightly as needed for muscle spasms 30 tablet 4    DULoxetine (CYMBALTA) 30 MG capsule Take 1 capsule (30 mg) by mouth 2 times daily. 60 capsule 2    empagliflozin (JARDIANCE) 25 MG TABS tablet Take 1 tablet (25 mg) by mouth daily 90 tablet 1    FLUoxetine (PROZAC) 20 MG capsule Take 1 capsule (20 mg) by mouth daily. 30 capsule 1    insulin aspart (NOVOLOG PEN) 100 UNIT/ML pen Inject 0-40 Units Subcutaneous 3 times daily (before meals) Per discharge instructions sliding scale (Patient not taking: Reported on 8/5/2024) 45 mL 2    insulin glargine (LANTUS PEN) 100 UNIT/ML pen Inject 80 Units subcutaneously every morning. 30 mL 1    insulin pen needle (32G X 4 MM) 32G X 4 MM miscellaneous Use as directed by provider. Per insurance coverage 100 each 0    levETIRAcetam (KEPPRA) 1000 MG tablet Take 1 tablet (1,000 mg) by mouth 2 times daily 60 tablet 11    lisinopril (ZESTRIL) 40 MG tablet Take 1 tablet (40 mg) by mouth daily 30 tablet 1    [START ON 12/12/2024] lorlatinib (LORBRENA) 25 MG Take 3 tablets (75 mg) by mouth daily 90 tablet 0    metFORMIN (GLUCOPHAGE) 500 MG tablet Take 1 tablet (500 mg) by mouth 2 times daily (with meals) 60 tablet 3    methadone (DOLOPHINE) 5 MG tablet Take 1 tablet (5 mg) by mouth 2 times daily. 60 tablet 0    methylphenidate (RITALIN) 5 MG tablet Take 1-2 tablets (5-10 mg) by mouth 2 times daily as needed (fatigue). Take second dose by 12 noon, if it is needed 90 tablet 0    naloxone (NARCAN) 4 MG/0.1ML nasal spray Spray 1 spray (4 mg) into one nostril alternating nostrils as needed for opioid reversal every 2-3 minutes until assistance arrives (Patient not taking: Reported on 9/4/2024) 0.2 mL 3    oxyCODONE IR (ROXICODONE) 10 MG tablet Take 1 tablet (10 mg) by  mouth every 3 hours as needed for moderate pain. 120 tablet 0    prochlorperazine (COMPAZINE) 10 MG tablet Take 1 tablet (10 mg) by mouth every 6 hours as needed for nausea or vomiting (Patient not taking: Reported on 8/5/2024) 30 tablet 2    propranolol (INDERAL) 20 MG tablet Take 1 tablet (20 mg) by mouth 2 times daily 60 tablet 1    rivaroxaban ANTICOAGULANT (XARELTO) 20 MG TABS tablet Take 1 tablet (20 mg) by mouth daily (with dinner) 90 tablet 3    rosuvastatin (CRESTOR) 10 MG tablet Take 1 tablet (10 mg) by mouth daily. 90 tablet 2    semaglutide (OZEMPIC) 2 MG/3ML pen Inject 0.5 mg subcutaneously every 7 days. For 4 weeks then increase to 0.5 mg once weekly if tolerating 3 mL 0     No current facility-administered medications for this visit.     BP (!) 151/106 (BP Location: Right arm, Patient Position: Sitting, Cuff Size: Adult Large)   Pulse 99   Temp 98.6  F (37  C) (Oral)   Resp 16   Wt 98.2 kg (216 lb 9.6 oz)   SpO2 97%   BMI 31.99 kg/m      General: Patient appears well in no acute distress.   Skin: No visualized rash or lesions on visualized skin  Eyes: EOMI, no erythema, sclera icterus or discharge noted  Resp: Breathing comfortably. No tenderness of R lower posterior and anterior chest  Skin: No erythema, ecchymosis, or edema over R trunk  Neurologic: No apparent tremors, facial movements symmetric  Psych: affect bright, alert and oriented        Labs & Studies: I personally reviewed the following studies:  Most Recent 3 CBC's:  Recent Labs   Lab Test 11/06/24  1510 10/04/24  1506 08/28/24  1157   WBC 7.6 9.6 9.2   HGB 15.1 14.5 13.8   MCV 90 92 93    193 139*     Most Recent 3 BMP's:  Recent Labs   Lab Test 11/06/24  1510 10/04/24  1506 08/28/24  1157    136 142   POTASSIUM 3.8 3.8 4.0   CHLORIDE 103 99 105   CO2 25 24 25   BUN 17.0 13.2 13.0   CR 0.67 0.75 0.73   ANIONGAP 13 13 12   TORY 9.4 9.5 8.9   * 419* 417*    Most Recent 2 LFT's:  Recent Labs   Lab Test 11/06/24  1510  10/04/24  1506   AST 13 20   ALT 19 29   ALKPHOS 118 105   BILITOTAL 0.2 0.3    Most Recent TSH and T4:  Recent Labs   Lab Test 09/12/22  1642   TSH 2.56        ASSESSMENT AND PLAN:  Stage IV NSCLC, Rt lung adenocarcinoma with metastasis to pleura, mediastinum , rt pleural effusion and brain diagnosed 1/2022 (AJCC 8th edition)  PD-L1 TPS 2-3% by [place]   NGS [facility] panel-EML4:ALK rearragement; chr2:[id number], chr2:[id number]  NGS [facility]- GNAS R201H, KRAS K5E- No ALK    He began Alectinib 600 mg BID 3/2/22 and unfortunately developed grade 3 myalgias  requiring dose reduction to 300 mg BID. In Dec 2022,we stopped drug 12/20/22 due to unremitting arthalgias, eventully had to starte PO steroids which led to improvement and resolved. Radiographicaly, he has had a near CR to Rx in the lung, has a residual rt LL lesion.     Began brigatinib 2/22/23 (delayed due to pt hesitancy). Developed severe cough on day 2 so brigatinib was held and cough resolved with in 24-48 hours. He restarted 60 mg daily and upped it to 90 mg.Restging CT showing stable disease in the lung, however, MRI with several contrast enhancement and increase in size of previously treated lesions. His dose was increased to 180 mg daily to address possible CNS disease progression and started on dexamethasone. 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 Restaging CT in 9/2023 showing overall disease stablity with no evidence of active cancer. 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  PET/CT after these three months showed oligoperisstent disease with a single focus of active malignancy in the RLL. Met with Thoracic surgery 12/2023 to discuss resection but they recommended against it due to risk for scarring after. He has not yet met with radiation for SBRT for more definitive  disease control of the oligopersistent disease. Most recent CT showing stable disease but brain MRI showing several possible new lesions and enlargement of exissting lesion (see below).     Switched Rx to Lorlatinib due to good CNS penetration. Repeat MRI end of August (6 weeks on Rx) with overall positive response, but a few enlarging and edematous lesions. On discussion with Dr. Arango this was though to be Rx related changes and plan to to next MRI in 3 months.     Now after about 4 months of therapy, has developed grade 3 fatigue and arthralgias/myalgias.  After 1 week off, we resumed at 75 mg daily. Tolerating this much better. He is over due for imaging, it is scheduled next week and I confirmed dates with him.    He has started ozempic and his BG are improved (reported around ~100).  He is very compliant with his DM medications. He hasn't been taking his BP often but did last week once and it was 150/90 (he couldn't remember exactly).     He is disabled by the fatigue/muscle aches,  pain meds aren't helping.  He is gong to apply for disability but strongly desires to keep working.  Discussed balancing side effects of drug and willing to try a lower dose for a better QOL.      Plan  Continue lorlatinib 75 mg daily  Labs, CT and brain MRI and Dr Persaud later this mo as scheduled    #acute on chronic R pleuritic pain  Suspect this could be msk based on precipitating event and characteristics on exam. We talked about mgmt with heat and flexeril (PTA med) prn. Other differential of course is malignancy or pna, CT is scheduled for next week to rule out.     # Brain mets:   # Radiation necrosis,   -Baseline Brain MRI with several brain mets, s/p GK to 11-12 brain mets. F/u Brain MRI in June 2022 was showing enlargement of the one of the lesions along with edema, therefore had to undergo craniotomy followed by resection, the final biopsy consistent with radiation necrosis.   -received two doses bevacizumab for  radiation necrosis in Fall 2022, tolerated poorly with HTN urgency and PE.)   -MRI in 4/2023 now showing contrast enhancement of lesions and a dominate lesion in the R frontal region with edema, several other smaller lesion. Short term MRI showing increase in size of the rt frontal lesion, underwent resection, patho again showing radiation necrosis. Restarted bevacizumab, which resulted in improved radiation necrosis / vasogenic edema on imaging in 10/2023 but ultimately was stopped due to uncontrolled HTN, last dose being 11/2023  -MRI imaging 1/2024 with stable disease and no edema.  -Brain MRI 3/2024 cancelled due to lapse in medical insurance.  -MRI 6/2024 showing showing several possible new lesions and enlargement of exissting lesion, however I am unsure if this is due to better imaging (perfusion MRI this time). Reviewed with rad onc and elected for short term MRI scan following initiation of lorlatinib, hold off on GK.  -MRI 8/28/24 with overall positive treatment response from rx but Two metastases (right parietal, left cerebellar) appears slightly enlarged, and Increased vasogenic edema associated with right temporal and left cerebellar metastases.    9/4/24. Dr. Arango reviewed brain MRI and feels ongoing surveillance for now is adequate. R parietal lesion overall stable compared to April 2023 MRI and while L cerebellar is more pronounced, it is stable in size and in prior treatment.   Due for repeat brain MRI     #HTN: on multiple agents. Reports compliance. Asked he check BP 1-2 x at home so we can     #hypercholesteremia   Recently Increased rosuvastatin from 5 to 10 mg; confirmed he is taking, Tryglycierides increasing, follow up cholesterol in 3 months    #Right pleuritic/chest pain  Felt to be 2/2 prior pleurex drain.   -On methadone and oxycodone; followed by Palliative Care     #Diabetes, Type 2  Often with hyperglycemia on labs. Compliant with insulin but not very adherent to diet. Started on  metformin , and insulin. Now been on 4 weeks of ozempic    #PE: provoked by malignancy vs bevacizumab in 11/2022  -- on rivaroxiabn 20 mg daily    #Grade 2-3 arthralgias    All joints affected, no morning stiffness, normal ESR and CRP  -felt to be 2/2 treatment.  Improved with lower dose    Chelsea Villegas CNP  40 minutes spent on the date of the encounter doing chart review, review of test results, interpretation of tests, patient visit, and documentation     The longitudinal plan of care for the diagnosis(es)/condition(s) as documented were addressed during this visit. Due to the added complexity in care, I will continue to support Connor in the subsequent management and with ongoing continuity of care.

## 2024-12-04 ENCOUNTER — ONCOLOGY VISIT (OUTPATIENT)
Dept: ONCOLOGY | Facility: CLINIC | Age: 44
End: 2024-12-04
Attending: NURSE PRACTITIONER
Payer: COMMERCIAL

## 2024-12-04 ENCOUNTER — PATIENT OUTREACH (OUTPATIENT)
Dept: CARE COORDINATION | Facility: CLINIC | Age: 44
End: 2024-12-04

## 2024-12-04 VITALS
SYSTOLIC BLOOD PRESSURE: 151 MMHG | RESPIRATION RATE: 16 BRPM | WEIGHT: 216.6 LBS | TEMPERATURE: 98.6 F | DIASTOLIC BLOOD PRESSURE: 106 MMHG | HEART RATE: 99 BPM | BODY MASS INDEX: 31.99 KG/M2 | OXYGEN SATURATION: 97 %

## 2024-12-04 DIAGNOSIS — I67.89 RADIATION THERAPY INDUCED BRAIN NECROSIS: ICD-10-CM

## 2024-12-04 DIAGNOSIS — Z79.4 TYPE 2 DIABETES MELLITUS WITHOUT COMPLICATION, WITH LONG-TERM CURRENT USE OF INSULIN (H): ICD-10-CM

## 2024-12-04 DIAGNOSIS — E11.9 TYPE 2 DIABETES MELLITUS WITHOUT COMPLICATION, WITH LONG-TERM CURRENT USE OF INSULIN (H): ICD-10-CM
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
## 2024-12-04 DIAGNOSIS — C79.31 MALIGNANT NEOPLASM METASTATIC TO BRAIN (H): ICD-10-CM

## 2024-12-04 DIAGNOSIS — J90 PLEURAL EFFUSION ON RIGHT: ICD-10-CM

## 2024-12-04 DIAGNOSIS — C34.31 MALIGNANT NEOPLASM OF LOWER LOBE OF RIGHT LUNG (H): Primary | ICD-10-CM

## 2024-12-04 PROCEDURE — G2211 COMPLEX E/M VISIT ADD ON: HCPCS | Performed by: NURSE PRACTITIONER

## 2024-12-04 PROCEDURE — 99215 OFFICE O/P EST HI 40 MIN: CPT | Performed by: NURSE PRACTITIONER

## 2024-12-04 PROCEDURE — G0463 HOSPITAL OUTPT CLINIC VISIT: HCPCS | Performed by: NURSE PRACTITIONER

## 2024-12-04 RX ORDER — METHADONE HYDROCHLORIDE 10 MG/1
TABLET ORAL
COMMUNITY
Start: 2024-01-12

## 2024-12-04 RX ORDER — KETOROLAC TROMETHAMINE 30 MG/ML
INJECTION, SOLUTION INTRAMUSCULAR; INTRAVENOUS
COMMUNITY
Start: 2024-09-04

## 2024-12-04 ASSESSMENT — PAIN SCALES - GENERAL: PAINLEVEL_OUTOF10: MODERATE PAIN (5)

## 2024-12-04 NOTE — PROGRESS NOTES
Social Work - Intervention  [facility]  Data/Intervention:  Metastatic NSCLC follows with Dr. Persaud.     Patient Name: Connor Emerson Goes By: Connor WHITEHEAD/Age: 1980 (44 year old)     Visit Type: telephone  Referral Source: NP  Reason for Referral: SSDI Process     Psychosocial Information/Concerns:  SSDI process - has questions.  Financial support- primary support for family of 6.     Intervention/Education/Resources Provided:  www.[email or web address]  Cancer Legal Line  [address] [place], [place] [address] [phone]    [employer] - if you apply online I will complete the medical verification form let me know via mychart- easy application:  [email or web address]    Apply for the financial assistance and family assistance program!     [employer] - applications due 2025:  https://www.[place].org/[place]fund.[email or web address]     Assessment/Plan:  No other needs identified today.  Will reach out in two weeks.     Provided patient/family with contact information and availability.    LISETH Randall, MaineGeneral Medical CenterSW   Adult Oncology - [place]  [phone]  Onsite [place] on    *Please note does not work on .   Support Groups at [facility]: Social Work Services for Cancer Patients ([email or web address])

## 2024-12-04 NOTE — NURSING NOTE
"Oncology Rooming Note    December 4, 2024 11:10 AM   Connor Emerson is a 44 year old male who presents for:    Chief Complaint   Patient presents with    Oncology Clinic Visit     Malignant neoplasm of lower lobe of right lung     Initial Vitals: BP (!) 151/106 (BP Location: Right arm, Patient Position: Sitting, Cuff Size: Adult Large)   Pulse 99   Temp 98.6  F (37  C) (Oral)   Resp 16   Wt 98.2 kg (216 lb 9.6 oz)   SpO2 97%   BMI 31.99 kg/m   Estimated body mass index is 31.99 kg/m  as calculated from the following:    Height as of 11/19/24: 1.753 m (5' 9\").    Weight as of this encounter: 98.2 kg (216 lb 9.6 oz). Body surface area is 2.19 meters squared.  Moderate Pain (5) Comment: Data Unavailable   No LMP for male patient.  Allergies reviewed: Yes  Medications reviewed: Yes    Medications: Medication refills not needed today.  Pharmacy name entered into EPIC:    [email or web address] [place] [place] [facility] [address]  [facility] [place] - [address] [place] [address]  [facility] [address] [facility] [place] - [facility] [place], [place] - [address] [facility] [phone]  [facility] - [place], [place] - [address] [place] [id number] [facility]    Frailty Screening:   Is the patient here for a new oncology consult visit in cancer care? 2. No      Clinical concerns: Right side pain (5).      Mai Linder, EMT  12/4/2024            "

## 2024-12-04 NOTE — LETTER
12/4/2024      Connor Emerson  [address]  [facility] [address]      Dear Colleague,    Thank you for referring your patient, Connor Emerson, to the [facility]. Please see a copy of my visit note below.          MEDICAL ONCOLOGY FOLLOW UP NOTE    PATIENT NAME: Connor Emerson  ENCOUNTER DATE: Dec 4, 2024        Care Team  Primary Oncologist: Bassam Persaud MD    REASON FOR CURRENT VISIT: F/u of lung cancer    HISTORY OF PRESENT ILLNESS:  Mr. Connor Emerson is a 44 year old  male who is a non-smoker with PMHx of T2DM, HTN with metastatic NSCLC comes for follow up     Oncologic Hx:    Diagnosis:   Stage IV NSCLC, Rt lung adenocarcinoma with metastasis to pleura, mediastinum , rt pleural effusion and brain diagnosed 1/2022 (AJCC 8th edition)  PD-L1 TPS 2-3% by [employer]   NGS [facility] panel-EML4:ALK rearragement  NGS Guardant- GNAS R201H, KRAS K5E- No ALK    Treatment:   7/10/24-current: Lolatinib 100 mg daily    2/23/2022- 6/2024: Brigatinib. Dose reduced to 60 mg due to cough after two days of 90 mg daily -->back on 90 mg---> increased to 180 mg  ---> settled on 120 mg    6/27/23- Right stealth craniotomy and resection of tumor:     7/20/23- 11/14/24: Bevacizumab for radiation necrosis. Discontinued due to severe HTN.    Past:  2/15/22- GK to 11 brain lesions  3/2/22- 12/2022 Alectinib 300 mg BID (Dose reduced to 450 mg BID from 600 mg BID due to grade 3 myalgias 3/21/22, again reduced to 300 mg BID 9/28/22)  6/28/22- Craniotomy, resection  9/28/22-10/26- Bevacizumab for radiation necrosis (stopped due to PE)      Intent of treatment: Palliative    Oncologic course:  1/19/22 to 1/22/22-Admitted to [facility] for 2 week progressive SOB secondary to have large rt sided pleural effusion, needing thoracentesis x2 (1.7L and 2.0 L removed), cytology positive for malignancy, adenocarcinoma.   1/26/22- Rt pleural mass biopsy-Dr. Agrawal--POSITIVE FOR ADENOCARCINOMA CONSISTENT WITH LUNG PRIMARY,  admixed with mesothelial hyperplasia and inflammatory infiltrate (+ TTF-1 and CK 7;  negative  p40, calretinin and WT-1. PAX8 immunostain focal +). 4th thoracentesis done simultaneously - 3L approx removed.   2/1/22- PET/CT-Right lower lobe central infiltrative FDG avid 8.2 x 9.6 cm mass representing a primary lung adenocarcinoma. Ipsilateral right perihilar, bilateral pretracheal, subcarinal and superior mediastinal michele metastases. Contralateral mildly FDG avid few lung nodules are suspicious for contralateral metastasis. At least 3 intracranial metastases in the right frontal lobe, left frontal lobe and left cerebellar hemisphere. Nonspecific mild diffuse bone marrow uptake. Further evaluation with a spine MRI could be considered to rule out early marrow infiltration. This could also be seen with red marrow conversion.  2/5/22-  Brain MRI- At least 9 intracranial metastases as detailed above. The dominant lesions involving the orbital right frontal lobe, the posterior left middle frontal gyrus, anterior right temporal lobe and in the left cerebellar hemisphere have surrounding moderate vasogenic type edema.  2/15/22- Saw Dr. Arango from Rad Onc- Rcd GK to 12 lesion in bran  2/16/22- Pleurex placement   3/2/22- Started Alectinib 600 mg BID  3/21/22- Dose reduced to 450 mg BID due to grade 3 myalgias and fatigue  4/2/22 to 4/5/22- Admitted at [facility] for- Severe sepsis due to MSSA infection of right PleurX catheter s/p removal- He presented with onset of pain at tube site starting 4/1; at arrival was tachycardic with leukocytosis (22.7) and elevated lactic acid (2.9).  CT chest showed fluid and stranding tracking outside the pleural space into chest wall along pleural catheter.  IR was consulted and removed catheter 4/2 with report of pustular drainage and tip culture growing MSSA.  Thoracic Surg was consulted who felt no surgical indication necessary given minimal pleural fluid and lack of any signs of  abscess.  Initially treated with broad spectrum coverage for sepsis, narrowed to Ancef once sensitivities returned with plan to transition to cefadroxil for an additional 10 days at discharge per ID. Held drug 4/2 to 4/11 5/2/22- CT CAP- Overall, positive response to therapy with decreased size of right lower lobe and right pleural-based masses, pulmonary metastases, hilar and mediastinal lymphadenopathy. However, a single right posterior pleural-based mass has slightly increased in size since 2/24/2022. No metastatic disease in the abdomen and pelvis. Right Pleurx catheter has been removed. Trace right pleural effusion and right basilar atelectasis.  5/2/22- Brain MRI- The previously demonstrated brain metastases are mildly diminished in size versus to 2/5/2022. The degree of edema is also diminished but not completely resolved. Probable trace amounts of intralesional bleeding demonstrated on the gradient sequence within the metastases. No definite new metastasis or progressive mass effect. No hydrocephalus or infarct.    6/15/22 to 6/17/22- Admitted at [facility]-with aphasia and word finding difficulty over last few weeks.  He presented to [facility] ED on 6/10 for evaluation of his symptoms. MRI brain showed multiple intracranial metastases, with interval enlargement of the dominant lesion within the left frontal lobe and increased surrounding vasogenic edema with 2 mm rightward shift of the septum pellucidum. Due to his worsening anxiety, he left AMA. His symptoms continued to progress to where he could not write at work so he decided to go to the ED for re-evaluation and treatment. Evaluated by NSGY, Rad Onc (radiaiton necrosis vs tumor progression).  6/16/22- MR Brain (6/16) shows multiple intracranial metastases, with interval enlargement  of the dominant lesion within the left frontal lobe and increased surrounding vasogenic edema with 2 mm rightward shift of the septum pellucidum.  6/16/22- - CT CAP shows slightly  decreased size of right lower lobe and right pleural-based masses. No new pulmonary nodules or lymphadenopathy; No evidence of metastatic disease in the abdomen or pelvis.   6/28/22 to 6/30/22- Admitted at [facility]- Elective left Stealth craniotomy with resection of brain tumor due to ongoing symptoms. No intraoperative complications. EBL 50 ml.  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  7/5/22- Ct CAP- Right lower lobe low-density nodules are not significantly changed. A small left upper lobe pulmonary nodule is also unchanged. Trace pleural fluid on the right has increased slightly. No convincing evidence for metastatic disease in the abdomen or pelvis.  7/18/22 to 7/19/22- Admitted to [facility] for seizure- Reportedly was only taking once Keppra instead of twice daily. Also resumed on dexamethasone 2 mg daily  8/1/22- Brain MRI- Redemonstrated postsurgical changes status post left frontoparietal Craniotomy. Interval increase in size of the dominant ring-enhancing lesion in the left posterior superior frontal lobe with increased moderate surrounding vasogenic edema and local mass effect resulting in narrowing of the supratentorial ventricular system. No significant midline shift/herniation at this time    8/1/22- Dex was increased to 4 mg daily by Dr. Moran    9/1/22- CT Chest- Near resolution of previously seen right pleural nodule. Stable right lower lobe pulmonary nodule    9/28/22- Bevacizumab for radiation necrosis    10/26/22- Bevacizumab     10/26/22- Ct CAP- Stable posterior medial right lower lobe 1.9 x 1.1 cm nodule series 8 image 176. Adjacent stable scarring and atelectasis. The previously noted pleural nodule posteriorly on the right is not currently clearly identified. Stable left upper lobe 0.3 cm nodule image 56    10/26/22- Brain MRI- Overall improved appearance of multiple intracranial metastases with near resolution of associated edema and diminished enhancement and size of multiple residual  lesions. No definite new or progressive metastasis.    11/7/22 to 11/9/22- Admitted for PE and HTN urgency- Small pulmonary embolism in the right lower lobe pulmonary artery. started on Lovenox, Brain MRI neg for PRES.    12/29/22- ED visit- bilateral hip pain, pain in shoulders, knuckles, knees, and ankles- holding alectinib since 12/20/22 1/6/23- Ct CAP- Mild groundglass nodularity in the left upper lobe is new since the previous exam, and may be infectious in etiology. No other significant interval change. Pulmonary nodules are not significantly changed.    1/6/23- Brain MRI- Stable to diminished sequelae of intracranial metastasis and treatment changes. No new or progressive metastasis. No superimposed acute intracranial finding.     2/23/2022- Start Brigatinib. Dose reduced to 60 mg due to cough after two days of 90 mg daily -  3/23/23-Brigatinib  (increase to 90 mg)    4/20/23- CT CAP- Stable- Previously noted mild groundglass nodularity in the left upper lobe has resolved.Pulmonary nodules are unchanged.Trace amount pleural fluid on the right has decreased slightly.    4/20/23- Brain MRI- Possile progression- Largest metastasis within the right frontal lobe has increased in size with worsening peripheral nodular enhancement and worsening vasogenic edema contributing to new right to left midline shift.  Multiple new/enlarging metastases scattered throughout the cerebral hemispheres and cerebellum. Started on dexamethasone by NGS on 4/28/23 5/17/23- presents to clinic with glucose 627, admission to hospital for stability on insulin drip    6/16/23- Brain MRI- Interval increase in size of the necrotic lesion in the anterior aspect of the right frontal lobe from 2.4 x 2.3 x 2.4 cm previously to 3.0 x 3.5 x 2.8 cm on the current study. Mass effect due to slightly increased vasogenic edema surrounding the right frontal lesion resulted in increase of leftward midline shift of the anterior interhemispheric  fissure from 0.7 cm previously to 0.9 cm currently. Interval increase in size in two adjacent metastases at the frontoparietal junction on the left. The remaining scattered intra-axial enhancing nodules are not changed appreciably in size or appearance since the comparison study.No evidence for acute intracranial pathology.   Dr. Moran- Due to worsening headaches and more problems with walking. Recommended a right Stealth craniotomy for resection of the lesion.     6/27/23- Right stealth craniotomy and resection of tumor: Final path: radiation necrosis    7/10/23- Ct CAP- stable Stable exam without evidence for new disease.Stable pulmonary nodules including a dominant nodular opacity at the right lower lobe.    7/20/23- Bevacizumab for radiation necrosis    8/16/23- Bevacizumab     9/12/23- Ct CAP-  Stable right lower lobe dominant nodular opacity. No new disease in the chest, abdomen and pelvis.     9/15, 10/6, 10/27, 11/17-  Bevacizumab    10/25/23- MRI Brain- 1. Redemonstrated postoperative changes of right frontal craniotomy. Decreased size of the right frontal resection cavity with significant decrease in the surrounding right frontal lobe vasogenic edema seen on the previous study. Mass effect has essentially resolved in the interim and there is no longer any midline shift. In the interim, slightly increased thickness of a rind of peripheral nodular enhancement along the posterior inferior and medial aspects of the right frontal lobe resection cavity, indeterminate. There is no significant elevated cerebral blood volume in this area. The findings may represent evolving posttreatment changes; however, residual tumor is not excluded.  Stable postoperative changes status post left anterior parietal craniotomy with irregular enhancement in the left frontal lobe parenchyma underlying the resection cavity, likely due to posttreatment change. Other scattered supratentorial and infratentorial metastatic lesions are  overall similar to slightly decreased in size, as described.    12/5/23- PET/CT- with sole site of disease in the RLL measuring 1.6 x 1.4 cm and with SUV max of 4.2. No other sites of disease. His case was reviewed at tumor board and he met with Dr. Fu 12/14/24 with recommendations against surgical resection due to risk of significant pleural scarring. Continued on brigatinib 120 mg daily.     1/29/24 Brain MRI: No new metastatic lesions. No significant change in supratentorial and infratentorial subcentimeter metastatic enhancing lesions. Stable right inferior frontal and left high frontal postsurgical changes.     12/22-current: lost to follow up due to insurance issues    3/15/24- CT chest- Stable nodule medial right lower lobe. No metastatic disease demonstrated.    6/21/24- CT CAP- stable    6/26/24- Brain MRI- Numerous new and increased size of intracranial metastatic lesions. Increased nodular enhancement about the medial aspect of the right frontal lobe resection margin with increased adjacent T2/FLAIR hyperintense signal although without elevated cerebral blood volume could represent posttreatment changes although disease progression could have a similar appearance.  Stable left frontal lobe resection changes with stable underlying curvilinear enhancement.    7/10/24: start lorlatinib 100 mg daily    7/23/24-7/25/24: [facility] with syncopal episodes, suspected to be vasovagal due to low pressures      Interval Hx:  Seeing Connor in clinic for follow up--called Triage yesterday with increased R sided torso pain. 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 He does not have pain with palpation, but has pain with rotation of torso to the right. He tried heat last night with relief. This pain feels different than his chronic R pleuritic pain related to prior pleurex    He has been back on lorlatinib for about a week now at lower dose of 75  mg--tolerating this much better. Arthralgias/myalgias have not returned and his energy is overall ok. . Using methadone daily and oxycodone 3-4/day.   Friday- had had syncope- felt weak, sat down and passed out.  This occurred 2 x last week.  Not similar to July episodes.     On ozempic--does not check sugars. Reports he has been compliant with BP meds, but doesn't really check BP at home. Last time he did it was 140/80-something.     ECOG PS 2    REVIEW OF SYSTEMS: 14 point ROS negative other than the symptoms noted above in the HPI.    Wt Readings from Last 4 Encounters:   11/19/24 96.2 kg (212 lb)   11/15/24 99.8 kg (220 lb)   10/07/24 97.5 kg (215 lb)   08/30/24 98.4 kg (217 lb)      Review of Systems:  A comprehensive ROS was performed and found to be negative or non-contributory with the exception of that noted in the HPI above.    Past Medical History:  GERD  Hypertension, not on medication  Type 2 diabetes mellitus, not on medications currently, previously on Metformin    Past Surgical History:  Past Surgical History:   Procedure Laterality Date     BRONCHOSCOPY RIGID OR FLEXIBLE W/TRANSENDOSCOPIC ENDOBRONCHIAL ULTRASOUND GUIDED Bilateral 1/26/2022    Procedure: Right BRONCHOSCOPY, FIBEROPTIC, endobronchial ultrasound, pleural biopsy;  Surgeon: Dallin Agrawal MD;  Location: [facility]     INJECT BLOCK MEDIAL BRANCH CERVICAL/THORACIC/LUMBAR       INSERT CHEST TUBE Right 2/16/2022    Procedure: INSERTION, CATHETER, INTERCOSTAL, FOR DRAINAGE;  Surgeon: Dallin Agrawal MD;  Location: [facility] GI     INSERT CHEST TUBE Right 3/9/2022    Procedure: INSERTION, CATHETER, INTERCOSTAL, FOR DRAINAGE;  Surgeon: Sushila Antonio MD;  Location: [facility] GI     IR CHEST TUBE REMOVAL TUNNELED RIGHT  4/2/2022     OPTICAL TRACKING SYSTEM CRANIOTOMY, EXCISE TUMOR, COMBINED Left 6/28/2022    Procedure: Left stealth craniotomy for tumor resection with motor mapping;  Surgeon: Stephen Moran MD;  Location:  OR     OPTICAL TRACKING SYSTEM  CRANIOTOMY, EXCISE TUMOR, COMBINED Right 2023    Procedure: Right stealth craniotomy and resection of tumor;  Surgeon: Stephen Moran MD;  Location:  OR     ORTHOPEDIC SURGERY      Ganesh. Rotator cuff repair.     PLEUROSCOPY N/A 2022    Procedure: Pleuroscopy with Pleural Biopsy;  Surgeon: Dallin Agrawal MD;  Location:  OR       Social History:  Lives with wife and 4 kids in [place]. Works as a  for an [employer] complex in [place]. Exposure to household chemicals and . No significant exposure to asbestos. No signal exposure to benzene or similar chemicals. No significant smoking history-states that he smoked 1 to 2 cigarettes occasionally per month for about 2 years in college, non-smoking since then. No significant alcohol use history. No other recreational substances. Good support system. Kids are 23, 19, 17 and 13.    Family History  Significant history for cancers on maternal side. Mother  of uterine cancer. 2 maternal uncles have possible metastatic melanoma.    Outpatient Medications:  Current Outpatient Medications   Medication Sig Dispense Refill     acetaminophen (TYLENOL) 325 MG tablet Take 325-650 mg by mouth every 6 hours as needed for mild pain       albuterol (PROAIR HFA/PROVENTIL HFA/VENTOLIN HFA) 108 (90 Base) MCG/ACT inhaler Inhale 2 puffs into the lungs every 6 hours as needed for shortness of breath, wheezing or cough (Patient not taking: Reported on 10/16/2024) 18 g 0     Alcohol Swabs PADS Use to swab the area of the injection or jabier as directed. Per insurance coverage 100 each 0     amLODIPine (NORVASC) 10 MG tablet Take 1 tablet (10 mg) by mouth daily. 90 tablet 0     blood glucose (NO BRAND SPECIFIED) lancets standard To use to test glucose level in the blood Use to test blood sugar  4  times daily as directed. To accompany glucose monitor brands per insurance coverage. 100 each 3     blood glucose (NO BRAND SPECIFIED) test strip To  use to test glucose level in the blood Use to test blood sugar  4 times daily as directed. To accompany glucose monitor brands per insurance coverage. 100 strip 3     Blood Glucose Monitoring Suppl (ACCU-CHEK GUIDE) w/Device KIT        Continuous Glucose Sensor (FREESTYLE IRENE 3 SENSOR) MISC 1 each every 14 days. Use 1 sensor every 14 days. Use to read blood sugars per 's instructions. 6 each 3     cyclobenzaprine (FLEXERIL) 5 MG tablet Take 2 tablets (10 mg) by mouth nightly as needed for muscle spasms 30 tablet 4     DULoxetine (CYMBALTA) 30 MG capsule Take 1 capsule (30 mg) by mouth 2 times daily. 60 capsule 2     empagliflozin (JARDIANCE) 25 MG TABS tablet Take 1 tablet (25 mg) by mouth daily 90 tablet 1     FLUoxetine (PROZAC) 20 MG capsule Take 1 capsule (20 mg) by mouth daily. 30 capsule 1     insulin aspart (NOVOLOG PEN) 100 UNIT/ML pen Inject 0-40 Units Subcutaneous 3 times daily (before meals) Per discharge instructions sliding scale (Patient not taking: Reported on 8/5/2024) 45 mL 2     insulin glargine (LANTUS PEN) 100 UNIT/ML pen Inject 80 Units subcutaneously every morning. 30 mL 1     insulin pen needle (32G X 4 MM) 32G X 4 MM miscellaneous Use as directed by provider. Per insurance coverage 100 each 0     levETIRAcetam (KEPPRA) 1000 MG tablet Take 1 tablet (1,000 mg) by mouth 2 times daily 60 tablet 11     lisinopril (ZESTRIL) 40 MG tablet Take 1 tablet (40 mg) by mouth daily 30 tablet 1     [START ON 12/12/2024] lorlatinib (LORBRENA) 25 MG Take 3 tablets (75 mg) by mouth daily 90 tablet 0     metFORMIN (GLUCOPHAGE) 500 MG tablet Take 1 tablet (500 mg) by mouth 2 times daily (with meals) 60 tablet 3     methadone (DOLOPHINE) 5 MG tablet Take 1 tablet (5 mg) by mouth 2 times daily. 60 tablet 0     methylphenidate (RITALIN) 5 MG tablet Take 1-2 tablets (5-10 mg) by mouth 2 times daily as needed (fatigue). Take second dose by 12 noon, if it is needed 90 tablet 0     naloxone (NARCAN) 4  MG/0.1ML nasal spray Spray 1 spray (4 mg) into one nostril alternating nostrils as needed for opioid reversal every 2-3 minutes until assistance arrives (Patient not taking: Reported on 9/4/2024) 0.2 mL 3     oxyCODONE IR (ROXICODONE) 10 MG tablet Take 1 tablet (10 mg) by mouth every 3 hours as needed for moderate pain. 120 tablet 0     prochlorperazine (COMPAZINE) 10 MG tablet Take 1 tablet (10 mg) by mouth every 6 hours as needed for nausea or vomiting (Patient not taking: Reported on 8/5/2024) 30 tablet 2     propranolol (INDERAL) 20 MG tablet Take 1 tablet (20 mg) by mouth 2 times daily 60 tablet 1     rivaroxaban ANTICOAGULANT (XARELTO) 20 MG TABS tablet Take 1 tablet (20 mg) by mouth daily (with dinner) 90 tablet 3     rosuvastatin (CRESTOR) 10 MG tablet Take 1 tablet (10 mg) by mouth daily. 90 tablet 2     semaglutide (OZEMPIC) 2 MG/3ML pen Inject 0.5 mg subcutaneously every 7 days. For 4 weeks then increase to 0.5 mg once weekly if tolerating 3 mL 0     No current facility-administered medications for this visit.     BP (!) 151/106 (BP Location: Right arm, Patient Position: Sitting, Cuff Size: Adult Large)   Pulse 99   Temp 98.6  F (37  C) (Oral)   Resp 16   Wt 98.2 kg (216 lb 9.6 oz)   SpO2 97%   BMI 31.99 kg/m      General: Patient appears well in no acute distress.   Skin: No visualized rash or lesions on visualized skin  Eyes: EOMI, no erythema, sclera icterus or discharge noted  Resp: Breathing comfortably. No tenderness of R lower posterior and anterior chest  Skin: No erythema, ecchymosis, or edema over R trunk  Neurologic: No apparent tremors, facial movements symmetric  Psych: affect bright, alert and oriented        Labs & Studies: I personally reviewed the following studies:  Most Recent 3 CBC's:  Recent Labs   Lab Test 11/06/24  1510 10/04/24  1506 08/28/24  1157   WBC 7.6 9.6 9.2   HGB 15.1 14.5 13.8   MCV 90 92 93    193 139*     Most Recent 3 BMP's:  Recent Labs   Lab Test  11/06/24  1510 10/04/24  1506 08/28/24  1157    136 142   POTASSIUM 3.8 3.8 4.0   CHLORIDE 103 99 105   CO2 25 24 25   BUN 17.0 13.2 13.0   CR 0.67 0.75 0.73   ANIONGAP 13 13 12   TORY 9.4 9.5 8.9   * 419* 417*    Most Recent 2 LFT's:  Recent Labs   Lab Test 11/06/24  1510 10/04/24  1506   AST 13 20   ALT 19 29   ALKPHOS 118 105   BILITOTAL 0.2 0.3    Most Recent TSH and T4:  Recent Labs   Lab Test 09/12/22  1642   TSH 2.56        ASSESSMENT AND PLAN:  Stage IV NSCLC, Rt lung adenocarcinoma with metastasis to pleura, mediastinum , rt pleural effusion and brain diagnosed 1/2022 (AJCC 8th edition)  PD-L1 TPS 2-3% by [employer]   NGS [facility] panel-EML4:ALK rearragement; chr2:[id number], chr2:[id number]  NGS [employer]- GNAS R201H, KRAS K5E- No ALK    He began Alectinib 600 mg BID 3/2/22 and unfortunately developed grade 3 myalgias  requiring dose reduction to 300 mg BID. In Dec 2022,we stopped drug 12/20/22 due to unremitting arthalgias, eventully had to starte PO steroids which led to improvement and resolved. Radiographicaly, he has had a near CR to Rx in the lung, has a residual rt LL lesion.     Began brigatinib 2/22/23 (delayed due to pt hesitancy). Developed severe cough on day 2 so brigatinib was held and cough resolved with in 24-48 hours. He restarted 60 mg daily and upped it to 90 mg.Restging CT showing stable disease in the lung, however, MRI with several contrast enhancement and increase in size of previously treated lesions. His dose was increased to 180 mg daily to address possible CNS disease progression and started on dexamethasone. 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 Restaging CT in 9/2023 showing overall disease stablity with no evidence of active cancer. 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  PET/CT after these  three months showed oligoperisstent disease with a single focus of active malignancy in the RLL. Met with Thoracic surgery 12/2023 to discuss resection but they recommended against it due to risk for scarring after. 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 Most recent CT showing stable disease but brain MRI showing several possible new lesions and enlargement of exissting lesion (see below).     Switched Rx to Lorlatinib due to good CNS penetration. Repeat MRI end of August (6 weeks on Rx) with overall positive response, but a few enlarging and edematous lesions. On discussion with Dr. Arango this was though to be Rx related changes and plan to to next MRI in 3 months.     Now after about 4 months of therapy, has developed grade 3 fatigue and arthralgias/myalgias.  After 1 week off, we resumed at 75 mg daily. Tolerating this much better. He is over due for imaging, it is scheduled next week and I confirmed dates with him.    He has started ozempic and his BG are improved (reported around ~100).  He is very compliant with his DM medications. He hasn't been taking his BP often but did last week once and it was 150/90 (he couldn't remember exactly).     He is disabled by the fatigue/muscle aches,  pain meds aren't helping.  He is gong to apply for disability but strongly desires to keep working.  Discussed balancing side effects of drug and willing to try a lower dose for a better QOL.      Plan  Continue lorlatinib 75 mg daily  Labs, CT and brain MRI and Dr Persaud later this mo as scheduled    #acute on chronic R pleuritic pain  Suspect this could be msk based on precipitating event and characteristics on exam. We talked about mgmt with heat and flexeril (PTA med) prn. Other differential of course is malignancy or pna, CT is scheduled for next week to rule out.     # Brain mets:   # Radiation necrosis,   -Baseline Brain MRI with several brain  mets, s/p GK to 11-12 brain mets. F/u Brain MRI in June 2022 was showing enlargement of the one of the lesions along with edema, therefore had to undergo craniotomy followed by resection, the final biopsy consistent with radiation necrosis.   -received two doses bevacizumab for radiation necrosis in Fall 2022, tolerated poorly with HTN urgency and PE.)   -MRI in 4/2023 now showing contrast enhancement of lesions and a dominate lesion in the R frontal region with edema, several other smaller lesion. Short term MRI showing increase in size of the rt frontal lesion, underwent resection, patho again showing radiation necrosis. Restarted bevacizumab, which resulted in improved radiation necrosis / vasogenic edema on imaging in 10/2023 but ultimately was stopped due to uncontrolled HTN, last dose being 11/2023  -MRI imaging 1/2024 with stable disease and no edema.  -Brain MRI 3/2024 cancelled due to lapse in medical insurance.  -MRI 6/2024 showing showing several possible new lesions and enlargement of exissting lesion, however I am unsure if this is due to better imaging (perfusion MRI this time). Reviewed with rad onc and elected for short term MRI scan following initiation of lorlatinib, hold off on GK.  -MRI 8/28/24 with overall positive treatment response from rx but Two metastases (right parietal, left cerebellar) appears slightly enlarged, and Increased vasogenic edema associated with right temporal and left cerebellar metastases.    9/4/24. Dr. Arango reviewed brain MRI and feels ongoing surveillance for now is adequate. R parietal lesion overall stable compared to April 2023 MRI and while L cerebellar is more pronounced, it is stable in size and in prior treatment.   Due for repeat brain MRI     #HTN: on multiple agents. Reports compliance. Asked he check BP 1-2 x at home so we can     #hypercholesteremia   Recently Increased rosuvastatin from 5 to 10 mg; confirmed he is taking, Tryglycierides increasing,  follow up cholesterol in 3 months    #Right pleuritic/chest pain  Felt to be 2/2 prior pleurex drain.   -On methadone and oxycodone; followed by Palliative Care     #Diabetes, Type 2  Often with hyperglycemia on labs. Compliant with insulin but not very adherent to diet. Started on metformin , and insulin. Now been on 4 weeks of ozempic    #PE: provoked by malignancy vs bevacizumab in 11/2022  -- on rivaroxiabn 20 mg daily    #Grade 2-3 arthralgias    All joints affected, no morning stiffness, normal ESR and CRP  -felt to be 2/2 treatment.  Improved with lower dose    Chelsea Villegas CNP  40 minutes spent on the date of the encounter doing chart review, review of test results, interpretation of tests, patient visit, and documentation     The longitudinal plan of care for the diagnosis(es)/condition(s) as documented were addressed during this visit. Due to the added complexity in care, I will continue to support Connor in the subsequent management and with ongoing continuity of care.           Again, thank you for allowing me to participate in the care of your patient.        Sincerely,        Chelsea Villegas, CNP

## 2024-12-09 ENCOUNTER — MYC REFILL (OUTPATIENT)
Dept: PALLIATIVE CARE | Facility: CLINIC | Age: 44
End: 2024-12-09
Payer: COMMERCIAL

## 2024-12-09 DIAGNOSIS — D49.6 BRAIN TUMOR (H): ICD-10-CM

## 2024-12-09 DIAGNOSIS — Z98.890 S/P CRANIOTOMY: ICD-10-CM

## 2024-12-09 NOTE — TELEPHONE ENCOUNTER
Received FONU2t message from patient requesting refill of oxycodone.     Last refill: 11/27/24 (Filled early due to holiday)  Last office visit: 10/21/24  Scheduled for follow up 1/2/25     Will route request to MD/ for review.     Reviewed [place]  Report.

## 2024-12-10 RX ORDER — OXYCODONE HYDROCHLORIDE 10 MG/1
10 TABLET ORAL
Qty: 120 TABLET | Refills: 0 | Status: SHIPPED | OUTPATIENT
Start: 2024-12-13

## 2024-12-11 ENCOUNTER — HOSPITAL ENCOUNTER (OUTPATIENT)
Dept: MRI IMAGING | Facility: CLINIC | Age: 44
Discharge: HOME | End: 2024-12-11
Attending: NURSE PRACTITIONER
Payer: COMMERCIAL

## 2024-12-11 ENCOUNTER — HOSPITAL ENCOUNTER (OUTPATIENT)
Dept: CT IMAGING | Facility: CLINIC | Age: 44
Discharge: HOME | End: 2024-12-11
Attending: STUDENT IN AN ORGANIZED HEALTH CARE EDUCATION/TRAINING PROGRAM
Payer: COMMERCIAL

## 2024-12-11 DIAGNOSIS — C34.31 MALIGNANT NEOPLASM OF LOWER LOBE OF RIGHT LUNG (H): ICD-10-CM

## 2024-12-11 DIAGNOSIS — C79.31 MALIGNANT NEOPLASM METASTATIC TO BRAIN (H): ICD-10-CM

## 2024-12-11 PROCEDURE — 250N000011 HC RX IP 250 OP 636: Performed by: STUDENT IN AN ORGANIZED HEALTH CARE EDUCATION/TRAINING PROGRAM

## 2024-12-11 PROCEDURE — A9585 GADOBUTROL INJECTION: HCPCS | Performed by: NURSE PRACTITIONER

## 2024-12-11 PROCEDURE — 71260 CT THORAX DX C+: CPT

## 2024-12-11 PROCEDURE — 250N000009 HC RX 250: Performed by: STUDENT IN AN ORGANIZED HEALTH CARE EDUCATION/TRAINING PROGRAM

## 2024-12-11 PROCEDURE — 70553 MRI BRAIN STEM W/O & W/DYE: CPT

## 2024-12-11 PROCEDURE — 74177 CT ABD & PELVIS W/CONTRAST: CPT

## 2024-12-11 PROCEDURE — 255N000002 HC RX 255 OP 636: Performed by: NURSE PRACTITIONER

## 2024-12-11 RX ORDER — GADOBUTROL 604.72 MG/ML
15 INJECTION INTRAVENOUS ONCE
Status: COMPLETED | OUTPATIENT
Start: 2024-12-11 | End: 2024-12-11

## 2024-12-11 RX ORDER — IOPAMIDOL 755 MG/ML
500 INJECTION, SOLUTION INTRAVASCULAR ONCE
Status: COMPLETED | OUTPATIENT
Start: 2024-12-11 | End: 2024-12-11

## 2024-12-11 RX ADMIN — SODIUM CHLORIDE 65 ML: 9 INJECTION, SOLUTION INTRAVENOUS at 09:44

## 2024-12-11 RX ADMIN — GADOBUTROL 15 ML: 604.72 INJECTION INTRAVENOUS at 08:37

## 2024-12-11 RX ADMIN — IOPAMIDOL 100 ML: 755 INJECTION, SOLUTION INTRAVENOUS at 09:44

## 2024-12-16 ENCOUNTER — APPOINTMENT (OUTPATIENT)
Dept: LAB | Facility: CLINIC | Age: 44
End: 2024-12-16
Attending: STUDENT IN AN ORGANIZED HEALTH CARE EDUCATION/TRAINING PROGRAM
Payer: COMMERCIAL

## 2024-12-16 ENCOUNTER — ONCOLOGY VISIT (OUTPATIENT)
Dept: ONCOLOGY | Facility: CLINIC | Age: 44
End: 2024-12-16
Attending: STUDENT IN AN ORGANIZED HEALTH CARE EDUCATION/TRAINING PROGRAM
Payer: COMMERCIAL

## 2024-12-16 VITALS
RESPIRATION RATE: 16 BRPM | HEART RATE: 113 BPM | TEMPERATURE: 98.5 F | SYSTOLIC BLOOD PRESSURE: 179 MMHG | DIASTOLIC BLOOD PRESSURE: 130 MMHG | WEIGHT: 220.4 LBS | OXYGEN SATURATION: 96 % | BODY MASS INDEX: 32.55 KG/M2

## 2024-12-16 DIAGNOSIS — I67.89 RADIATION THERAPY INDUCED BRAIN NECROSIS: ICD-10-CM

## 2024-12-16 DIAGNOSIS — C34.31 MALIGNANT NEOPLASM OF LOWER LOBE OF RIGHT LUNG (H): ICD-10-CM
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
## 2024-12-16 LAB
ALBUMIN SERPL BCG-MCNC: 4.4 G/DL (ref 3.5–5.2)
ALP SERPL-CCNC: 135 U/L (ref 40–150)
ALT SERPL W P-5'-P-CCNC: 18 U/L (ref 0–70)
ANION GAP SERPL CALCULATED.3IONS-SCNC: 15 MMOL/L (ref 7–15)
AST SERPL W P-5'-P-CCNC: 13 U/L (ref 0–45)
BASOPHILS # BLD AUTO: 0.1 10E3/UL (ref 0–0.2)
BASOPHILS NFR BLD AUTO: 0 %
BILIRUB SERPL-MCNC: 0.3 MG/DL
BUN SERPL-MCNC: 16.5 MG/DL (ref 6–20)
CALCIUM SERPL-MCNC: 10.1 MG/DL (ref 8.8–10.4)
CHLORIDE SERPL-SCNC: 97 MMOL/L (ref 98–107)
CHOLEST SERPL-MCNC: 254 MG/DL
CREAT SERPL-MCNC: 0.64 MG/DL (ref 0.67–1.17)
EGFRCR SERPLBLD CKD-EPI 2021: >90 ML/MIN/1.73M2
EOSINOPHIL # BLD AUTO: 0.2 10E3/UL (ref 0–0.7)
EOSINOPHIL NFR BLD AUTO: 2 %
ERYTHROCYTE [DISTWIDTH] IN BLOOD BY AUTOMATED COUNT: 12.9 % (ref 10–15)
FASTING STATUS PATIENT QL REPORTED: NO
FASTING STATUS PATIENT QL REPORTED: NO
GLUCOSE SERPL-MCNC: 545 MG/DL (ref 70–99)
HCO3 SERPL-SCNC: 27 MMOL/L (ref 22–29)
HCT VFR BLD AUTO: 47.2 % (ref 40–53)
HDLC SERPL-MCNC: 48 MG/DL
HGB BLD-MCNC: 16 G/DL (ref 13.3–17.7)
IMM GRANULOCYTES # BLD: 0 10E3/UL
IMM GRANULOCYTES NFR BLD: 0 %
LDLC SERPL CALC-MCNC: 146 MG/DL
LYMPHOCYTES # BLD AUTO: 1.4 10E3/UL (ref 0.8–5.3)
LYMPHOCYTES NFR BLD AUTO: 12 %
MAGNESIUM SERPL-MCNC: 1.9 MG/DL (ref 1.7–2.3)
MCH RBC QN AUTO: 30.1 PG (ref 26.5–33)
MCHC RBC AUTO-ENTMCNC: 33.9 G/DL (ref 31.5–36.5)
MCV RBC AUTO: 89 FL (ref 78–100)
MONOCYTES # BLD AUTO: 0.8 10E3/UL (ref 0–1.3)
MONOCYTES NFR BLD AUTO: 7 %
NEUTROPHILS # BLD AUTO: 9 10E3/UL (ref 1.6–8.3)
NEUTROPHILS NFR BLD AUTO: 79 %
NONHDLC SERPL-MCNC: 206 MG/DL
NRBC # BLD AUTO: 0 10E3/UL
NRBC BLD AUTO-RTO: 0 /100
PHOSPHATE SERPL-MCNC: 4.4 MG/DL (ref 2.5–4.5)
PLATELET # BLD AUTO: 199 10E3/UL (ref 150–450)
POTASSIUM SERPL-SCNC: 4.7 MMOL/L (ref 3.4–5.3)
PROT SERPL-MCNC: 6.9 G/DL (ref 6.4–8.3)
RBC # BLD AUTO: 5.32 10E6/UL (ref 4.4–5.9)
SODIUM SERPL-SCNC: 139 MMOL/L (ref 135–145)
TRIGL SERPL-MCNC: 299 MG/DL
WBC # BLD AUTO: 11.5 10E3/UL (ref 4–11)

## 2024-12-16 PROCEDURE — 83735 ASSAY OF MAGNESIUM: CPT | Performed by: STUDENT IN AN ORGANIZED HEALTH CARE EDUCATION/TRAINING PROGRAM

## 2024-12-16 PROCEDURE — 80061 LIPID PANEL: CPT | Performed by: STUDENT IN AN ORGANIZED HEALTH CARE EDUCATION/TRAINING PROGRAM

## 2024-12-16 PROCEDURE — 85004 AUTOMATED DIFF WBC COUNT: CPT | Performed by: STUDENT IN AN ORGANIZED HEALTH CARE EDUCATION/TRAINING PROGRAM

## 2024-12-16 PROCEDURE — 85041 AUTOMATED RBC COUNT: CPT | Performed by: STUDENT IN AN ORGANIZED HEALTH CARE EDUCATION/TRAINING PROGRAM

## 2024-12-16 PROCEDURE — 84100 ASSAY OF PHOSPHORUS: CPT | Performed by: STUDENT IN AN ORGANIZED HEALTH CARE EDUCATION/TRAINING PROGRAM

## 2024-12-16 PROCEDURE — 82565 ASSAY OF CREATININE: CPT | Performed by: STUDENT IN AN ORGANIZED HEALTH CARE EDUCATION/TRAINING PROGRAM

## 2024-12-16 PROCEDURE — 36415 COLL VENOUS BLD VENIPUNCTURE: CPT | Performed by: STUDENT IN AN ORGANIZED HEALTH CARE EDUCATION/TRAINING PROGRAM

## 2024-12-16 PROCEDURE — 84295 ASSAY OF SERUM SODIUM: CPT | Performed by: STUDENT IN AN ORGANIZED HEALTH CARE EDUCATION/TRAINING PROGRAM

## 2024-12-16 PROCEDURE — G0463 HOSPITAL OUTPT CLINIC VISIT: HCPCS | Performed by: STUDENT IN AN ORGANIZED HEALTH CARE EDUCATION/TRAINING PROGRAM

## 2024-12-16 ASSESSMENT — PAIN SCALES - GENERAL: PAINLEVEL_OUTOF10: NO PAIN (0)

## 2024-12-16 NOTE — NURSING NOTE
"Oncology Rooming Note    December 16, 2024 2:43 PM   Connor Emerson is a 44 year old male who presents for:    Chief Complaint   Patient presents with    Oncology Clinic Visit     Malignant neoplasm of lower lobe of right lung     Initial Vitals: BP (!) 179/130   Pulse 113   Temp 98.5  F (36.9  C) (Oral)   Resp 16   Wt 100 kg (220 lb 6.4 oz)   SpO2 96%   BMI 32.55 kg/m   Estimated body mass index is 32.55 kg/m  as calculated from the following:    Height as of 11/19/24: 1.753 m (5' 9\").    Weight as of this encounter: 100 kg (220 lb 6.4 oz). Body surface area is 2.21 meters squared.  No Pain (0) Comment: Data Unavailable   No LMP for male patient.  Allergies reviewed: Yes  Medications reviewed: Yes    Medications: Medication refills not needed today.  Pharmacy name entered into EPIC:    [facility] [place] [place] - [id number] [facility] - [place], [place] - [address] [place] [address]  [facility] [address] [facility] [place], [place] - [address] [place], [place] - [address] [facility] [phone]  [facility] - [place], [place] - [address] [facility] [place], [place] - [address] [facility]    Frailty Screening:   Is the patient here for a new oncology consult visit in cancer care? 2. No      Clinical concerns: BP high in lab: 201/137, 179/130; pt has a headache and had one last week also in the same spot.      Mai Linder, EMT  12/16/2024              "

## 2024-12-16 NOTE — NURSING NOTE
Chief Complaint   Patient presents with    Oncology Clinic Visit     Malignant neoplasm of lower lobe of right lung    Blood Draw     Vitals, blood drawn via VPT by LPN. Pt checked into appt.      Secure chat was sent to provider regarding BP's.     RBailee Kahn LPN

## 2024-12-16 NOTE — LETTER
12/16/2024      Connor Emerson  [address]  [facility] [address]      Dear Colleague,    Thank you for referring your patient, Connor Emerson, to the [facility]. Please see a copy of my visit note below.          MEDICAL ONCOLOGY FOLLOW UP NOTE    PATIENT NAME: Connor Emerson  ENCOUNTER DATE: Dec 16, 2024        Care Team  Primary Oncologist: Bassam Persaud MD    REASON FOR CURRENT VISIT: F/u of lung cancer    HISTORY OF PRESENT ILLNESS:  Mr. Connor Emerson is a 44 year old  male who is a non-smoker with PMHx of T2DM, HTN with metastatic NSCLC comes for follow up     Oncologic Hx:    Diagnosis:   Stage IV NSCLC, Rt lung adenocarcinoma with metastasis to pleura, mediastinum , rt pleural effusion and brain diagnosed 1/2022 (AJCC 8th edition)  PD-L1 TPS 2-3% by [employer]   NGS [facility] panel-EML4:ALK rearragement  NGS [facility]- GNAS R201H, KRAS K5E- No ALK    Treatment:   7/10/24-current: Lolatinib 100 mg daily    2/23/2022- 6/2024: Brigatinib. Dose reduced to 60 mg due to cough after two days of 90 mg daily -->back on 90 mg---> increased to 180 mg  ---> settled on 120 mg    6/27/23- Right stealth craniotomy and resection of tumor:     7/20/23- 11/14/23: Bevacizumab for radiation necrosis. Discontinued due to severe HTN.    Past:  2/15/22- GK to 11 brain lesions  3/2/22- 12/2022 Alectinib 300 mg BID (Dose reduced to 450 mg BID from 600 mg BID due to grade 3 myalgias 3/21/22, again reduced to 300 mg BID 9/28/22)  6/28/22- Craniotomy, resection  9/28/22-10/26- Bevacizumab for radiation necrosis (stopped due to PE)      Intent of treatment: Palliative    Oncologic course:  1/19/22 to 1/22/22-Admitted to [facility] for 2 week progressive SOB secondary to have large rt sided pleural effusion, needing thoracentesis x2 (1.7L and 2.0 L removed), cytology positive for malignancy, adenocarcinoma.   1/26/22- Rt pleural mass biopsy-Dr. Agrawal--POSITIVE FOR ADENOCARCINOMA CONSISTENT WITH LUNG PRIMARY,  admixed with mesothelial hyperplasia and inflammatory infiltrate (+ TTF-1 and CK 7;  negative  p40, calretinin and WT-1. PAX8 immunostain focal +). 4th thoracentesis done simultaneously - 3L approx removed.   2/1/22- PET/CT-Right lower lobe central infiltrative FDG avid 8.2 x 9.6 cm mass representing a primary lung adenocarcinoma. Ipsilateral right perihilar, bilateral pretracheal, subcarinal and superior mediastinal michele metastases. Contralateral mildly FDG avid few lung nodules are suspicious for contralateral metastasis. At least 3 intracranial metastases in the right frontal lobe, left frontal lobe and left cerebellar hemisphere. Nonspecific mild diffuse bone marrow uptake. Further evaluation with a spine MRI could be considered to rule out early marrow infiltration. This could also be seen with red marrow conversion.  2/5/22-  Brain MRI- At least 9 intracranial metastases as detailed above. The dominant lesions involving the orbital right frontal lobe, the posterior left middle frontal gyrus, anterior right temporal lobe and in the left cerebellar hemisphere have surrounding moderate vasogenic type edema.  2/15/22- Saw Dr. Arango from Rad Onc- Rcd GK to 12 lesion in bran  2/16/22- Pleurex placement   3/2/22- Started Alectinib 600 mg BID  3/21/22- Dose reduced to 450 mg BID due to grade 3 myalgias and fatigue  4/2/22 to 4/5/22- Admitted at [facility] for- Severe sepsis due to MSSA infection of right PleurX catheter s/p removal- He presented with onset of pain at tube site starting 4/1; at arrival was tachycardic with leukocytosis (22.7) and elevated lactic acid (2.9).  CT chest showed fluid and stranding tracking outside the pleural space into chest wall along pleural catheter.  IR was consulted and removed catheter 4/2 with report of pustular drainage and tip culture growing MSSA.  Thoracic Surg was consulted who felt no surgical indication necessary given minimal pleural fluid and lack of any signs of  abscess.  Initially treated with broad spectrum coverage for sepsis, narrowed to Ancef once sensitivities returned with plan to transition to cefadroxil for an additional 10 days at discharge per ID. Held drug 4/2 to 4/11 5/2/22- CT CAP- Overall, positive response to therapy with decreased size of right lower lobe and right pleural-based masses, pulmonary metastases, hilar and mediastinal lymphadenopathy. However, a single right posterior pleural-based mass has slightly increased in size since 2/24/2022. No metastatic disease in the abdomen and pelvis. Right Pleurx catheter has been removed. Trace right pleural effusion and right basilar atelectasis.  5/2/22- Brain MRI- The previously demonstrated brain metastases are mildly diminished in size versus to 2/5/2022. The degree of edema is also diminished but not completely resolved. Probable trace amounts of intralesional bleeding demonstrated on the gradient sequence within the metastases. No definite new metastasis or progressive mass effect. No hydrocephalus or infarct.    6/15/22 to 6/17/22- Admitted at [facility]-with aphasia and word finding difficulty over last few weeks.  He presented to [facility] ED on 6/10 for evaluation of his symptoms. MRI brain showed multiple intracranial metastases, with interval enlargement of the dominant lesion within the left frontal lobe and increased surrounding vasogenic edema with 2 mm rightward shift of the septum pellucidum. Due to his worsening anxiety, he left AMA. His symptoms continued to progress to where he could not write at work so he decided to go to the ED for re-evaluation and treatment. Evaluated by NSGY, Rad Onc (radiaiton necrosis vs tumor progression).  6/16/22- MR Brain (6/16) shows multiple intracranial metastases, with interval enlargement  of the dominant lesion within the left frontal lobe and increased surrounding vasogenic edema with 2 mm rightward shift of the septum pellucidum.  6/16/22- - CT CAP shows slightly  decreased size of right lower lobe and right pleural-based masses. No new pulmonary nodules or lymphadenopathy; No evidence of metastatic disease in the abdomen or pelvis.   6/28/22 to 6/30/22- Admitted at [facility]- Elective left Stealth craniotomy with resection of brain tumor due to ongoing symptoms. No intraoperative complications. EBL 50 ml.  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  7/5/22- Ct CAP- Right lower lobe low-density nodules are not significantly changed. A small left upper lobe pulmonary nodule is also unchanged. Trace pleural fluid on the right has increased slightly. No convincing evidence for metastatic disease in the abdomen or pelvis.  7/18/22 to 7/19/22- Admitted to [facility] for seizure- Reportedly was only taking once Keppra instead of twice daily. Also resumed on dexamethasone 2 mg daily  8/1/22- Brain MRI- Redemonstrated postsurgical changes status post left frontoparietal Craniotomy. Interval increase in size of the dominant ring-enhancing lesion in the left posterior superior frontal lobe with increased moderate surrounding vasogenic edema and local mass effect resulting in narrowing of the supratentorial ventricular system. No significant midline shift/herniation at this time    8/1/22- Dex was increased to 4 mg daily by Dr. Moran    9/1/22- CT Chest- Near resolution of previously seen right pleural nodule. Stable right lower lobe pulmonary nodule    9/28/22- Bevacizumab for radiation necrosis    10/26/22- Bevacizumab     10/26/22- Ct CAP- Stable posterior medial right lower lobe 1.9 x 1.1 cm nodule series 8 image 176. Adjacent stable scarring and atelectasis. The previously noted pleural nodule posteriorly on the right is not currently clearly identified. Stable left upper lobe 0.3 cm nodule image 56    10/26/22- Brain MRI- Overall improved appearance of multiple intracranial metastases with near resolution of associated edema and diminished enhancement and size of multiple residual  lesions. No definite new or progressive metastasis.    11/7/22 to 11/9/22- Admitted for PE and HTN urgency- Small pulmonary embolism in the right lower lobe pulmonary artery. started on Lovenox, Brain MRI neg for PRES.    12/29/22- ED visit- bilateral hip pain, pain in shoulders, knuckles, knees, and ankles- holding alectinib since 12/20/22 1/6/23- Ct CAP- Mild groundglass nodularity in the left upper lobe is new since the previous exam, and may be infectious in etiology. No other significant interval change. Pulmonary nodules are not significantly changed.    1/6/23- Brain MRI- Stable to diminished sequelae of intracranial metastasis and treatment changes. No new or progressive metastasis. No superimposed acute intracranial finding.     2/23/2022- Start Brigatinib. Dose reduced to 60 mg due to cough after two days of 90 mg daily -  3/23/23-Brigatinib  (increase to 90 mg)    4/20/23- CT CAP- Stable- Previously noted mild groundglass nodularity in the left upper lobe has resolved.Pulmonary nodules are unchanged.Trace amount pleural fluid on the right has decreased slightly.    4/20/23- Brain MRI- Possile progression- Largest metastasis within the right frontal lobe has increased in size with worsening peripheral nodular enhancement and worsening vasogenic edema contributing to new right to left midline shift.  Multiple new/enlarging metastases scattered throughout the cerebral hemispheres and cerebellum. Started on dexamethasone by NGS on 4/28/23 5/17/23- presents to clinic with glucose 627, admission to hospital for stability on insulin drip    6/16/23- Brain MRI- Interval increase in size of the necrotic lesion in the anterior aspect of the right frontal lobe from 2.4 x 2.3 x 2.4 cm previously to 3.0 x 3.5 x 2.8 cm on the current study. Mass effect due to slightly increased vasogenic edema surrounding the right frontal lesion resulted in increase of leftward midline shift of the anterior interhemispheric  fissure from 0.7 cm previously to 0.9 cm currently. Interval increase in size in two adjacent metastases at the frontoparietal junction on the left. The remaining scattered intra-axial enhancing nodules are not changed appreciably in size or appearance since the comparison study.No evidence for acute intracranial pathology.   Dr. Moran- Due to worsening headaches and more problems with walking. Recommended a right Stealth craniotomy for resection of the lesion.     6/27/23- Right stealth craniotomy and resection of tumor: Final path: radiation necrosis    7/10/23- Ct CAP- stable Stable exam without evidence for new disease.Stable pulmonary nodules including a dominant nodular opacity at the right lower lobe.    7/20/23- Bevacizumab for radiation necrosis    8/16/23- Bevacizumab     9/12/23- Ct CAP-  Stable right lower lobe dominant nodular opacity. No new disease in the chest, abdomen and pelvis.     9/15, 10/6, 10/27, 11/17-  Bevacizumab    10/25/23- MRI Brain- 1. Redemonstrated postoperative changes of right frontal craniotomy. Decreased size of the right frontal resection cavity with significant decrease in the surrounding right frontal lobe vasogenic edema seen on the previous study. Mass effect has essentially resolved in the interim and there is no longer any midline shift. In the interim, slightly increased thickness of a rind of peripheral nodular enhancement along the posterior inferior and medial aspects of the right frontal lobe resection cavity, indeterminate. There is no significant elevated cerebral blood volume in this area. The findings may represent evolving posttreatment changes; however, residual tumor is not excluded.  Stable postoperative changes status post left anterior parietal craniotomy with irregular enhancement in the left frontal lobe parenchyma underlying the resection cavity, likely due to posttreatment change. Other scattered supratentorial and infratentorial metastatic lesions are  overall similar to slightly decreased in size, as described.    12/5/23- PET/CT- with sole site of disease in the RLL measuring 1.6 x 1.4 cm and with SUV max of 4.2. No other sites of disease. His case was reviewed at tumor board and he met with Dr. Fu 12/14/24 with recommendations against surgical resection due to risk of significant pleural scarring. Continued on brigatinib 120 mg daily.     1/29/24 Brain MRI: No new metastatic lesions. No significant change in supratentorial and infratentorial subcentimeter metastatic enhancing lesions. Stable right inferior frontal and left high frontal postsurgical changes.     12/22-current: lost to follow up due to insurance issues    3/15/24- CT chest- Stable nodule medial right lower lobe. No metastatic disease demonstrated.    6/21/24- CT CAP- stable    6/26/24- Brain MRI- Numerous new and increased size of intracranial metastatic lesions. Increased nodular enhancement about the medial aspect of the right frontal lobe resection margin with increased adjacent T2/FLAIR hyperintense signal although without elevated cerebral blood volume could represent posttreatment changes although disease progression could have a similar appearance.  Stable left frontal lobe resection changes with stable underlying curvilinear enhancement.    7/10/24: start lorlatinib 100 mg daily    7/23/24-7/25/24: [facility] with syncopal episodes, suspected to be vasovagal due to low pressures    12/11/24- Ct CAP-   Stable right lower lobe nodular opacity, likely related to posttreatment changes. No new disease in the chest, abdomen and pelvis.    12/11/24- MRI Brain- Redemonstrated postsurgical changes status post right frontoparietal craniotomy. The underlying irregular enhancing lesion in the anterolateral right frontal lobe appears mildly decreased in size (series 14 image 100). Redemonstrated postsurgical changes status post left parietal vertex craniotomy. The underlying irregular enhancing lesion  in the posterior superior left frontal lobe appears unchanged (series 14 image 135). Couple of lesions, including lesions in the right temporal lobe  (series 14 image 60) and in the left cerebellar hemisphere (series 14 image 49) may be slightly increased in size. There also appears to be similar if not slightly increased vasogenic edema in the right temporal lobe and left cerebellar hemisphere. Differential considerations would include mild interval progression of disease versus evolving treatment-related effects. Recommend continued close interval follow-up. Multiple additional scattered supratentorial and infratentorial enhancing intra-axial lesions concerning for metastatic disease appear otherwise similar in size.      Interval Hx:  Seeing Connor in clinic for follow up--  Has noticed a very bad headache sine this AM, has taken all of his anti-HTN meds for today  Has also noticed white falshes today  He threw up twice this AM likley from the headache  Headache is mostly in the left frontal/cranial area  Had similar headache 1 week ago but not as bad as this  He is not measuring his BP at home and also not very regularly measuring his Blood sugars  No syncopal episodes  No chest pain, cough, SOB  Taking the 75 mg lorlatinib, tolerating this dose well  Has notcied muscle aches have decreased, still using the pain meds, Using methadone daily, still using oxycodone prn (uses about 6/day)  Still able to work   Eating aand drinking ok  Weight is slightly up  Continues to have chronic pain in the rt pleuritic area, not worse      ECOG PS 1    REVIEW OF SYSTEMS: 14 point ROS negative other than the symptoms noted above in the HPI.    Wt Readings from Last 4 Encounters:   12/16/24 100 kg (220 lb 6.4 oz)   12/04/24 98.2 kg (216 lb 9.6 oz)   11/19/24 96.2 kg (212 lb)   11/15/24 99.8 kg (220 lb)      Review of Systems:  A comprehensive ROS was performed and found to be negative or non-contributory with the exception of that  noted in the HPI above.    Past Medical History:  GERD  Hypertension, not on medication  Type 2 diabetes mellitus, not on medications currently, previously on Metformin    Past Surgical History:  Past Surgical History:   Procedure Laterality Date     BRONCHOSCOPY RIGID OR FLEXIBLE W/TRANSENDOSCOPIC ENDOBRONCHIAL ULTRASOUND GUIDED Bilateral 1/26/2022    Procedure: Right BRONCHOSCOPY, FIBEROPTIC, endobronchial ultrasound, pleural biopsy;  Surgeon: Dallin Agrawal MD;  Location: [facility] OR     INJECT BLOCK MEDIAL BRANCH CERVICAL/THORACIC/LUMBAR       INSERT CHEST TUBE Right 2/16/2022    Procedure: INSERTION, CATHETER, INTERCOSTAL, FOR DRAINAGE;  Surgeon: Dallin Agrawal MD;  Location: [facility] GI     INSERT CHEST TUBE Right 3/9/2022    Procedure: INSERTION, CATHETER, INTERCOSTAL, FOR DRAINAGE;  Surgeon: Sushila Antonio MD;  Location: [facility] GI     IR CHEST TUBE REMOVAL TUNNELED RIGHT  4/2/2022     OPTICAL TRACKING SYSTEM CRANIOTOMY, EXCISE TUMOR, COMBINED Left 6/28/2022    Procedure: Left stealth craniotomy for tumor resection with motor mapping;  Surgeon: Stephen Moran MD;  Location:  OR     OPTICAL TRACKING SYSTEM CRANIOTOMY, EXCISE TUMOR, COMBINED Right 6/27/2023    Procedure: Right stealth craniotomy and resection of tumor;  Surgeon: Stephen Moran MD;  Location:  OR     ORTHOPEDIC SURGERY      Ganesh. Rotator cuff repair.     PLEUROSCOPY N/A 1/26/2022    Procedure: Pleuroscopy with Pleural Biopsy;  Surgeon: Dallin Agrawal MD;  Location:  OR       Social History:  Lives with wife and 4 kids in [place]. Works as a  for an apartment complex in [place]. Exposure to household chemicals and . No significant exposure to asbestos. No signal exposure to benzene or similar chemicals. No significant smoking history-states that he smoked 1 to 2 cigarettes occasionally per month for about 2 years in college, non-smoking since then. No significant alcohol use history. No other recreational  substances. Good support system. Kids are 23, 19, 17 and 13.    Family History  Significant history for cancers on maternal side. Mother  of uterine cancer. 2 maternal uncles have possible metastatic melanoma.    Outpatient Medications:  Current Outpatient Medications   Medication Sig Dispense Refill     acetaminophen (TYLENOL) 325 MG tablet Take 325-650 mg by mouth every 6 hours as needed for mild pain       Alcohol Swabs PADS Use to swab the area of the injection or jabier as directed. Per insurance coverage 100 each 0     amLODIPine (NORVASC) 10 MG tablet Take 1 tablet (10 mg) by mouth daily. 90 tablet 0     blood glucose (NO BRAND SPECIFIED) lancets standard To use to test glucose level in the blood Use to test blood sugar  4  times daily as directed. To accompany glucose monitor brands per insurance coverage. 100 each 3     blood glucose (NO BRAND SPECIFIED) test strip To use to test glucose level in the blood Use to test blood sugar  4 times daily as directed. To accompany glucose monitor brands per insurance coverage. 100 strip 3     Blood Glucose Monitoring Suppl (ACCU-CHEK GUIDE) w/Device KIT        Continuous Glucose  (FREESTYLE IRENE 3 READER) YVES        Continuous Glucose Sensor (FREESTYLE IRENE 3 SENSOR) MISC 1 each every 14 days. Use 1 sensor every 14 days. Use to read blood sugars per 's instructions. 6 each 3     cyclobenzaprine (FLEXERIL) 5 MG tablet Take 2 tablets (10 mg) by mouth nightly as needed for muscle spasms 30 tablet 4     DULoxetine (CYMBALTA) 30 MG capsule Take 1 capsule (30 mg) by mouth 2 times daily. 60 capsule 2     empagliflozin (JARDIANCE) 25 MG TABS tablet Take 1 tablet (25 mg) by mouth daily 90 tablet 1     FLUoxetine (PROZAC) 20 MG capsule Take 1 capsule (20 mg) by mouth daily. 30 capsule 1     insulin glargine (LANTUS PEN) 100 UNIT/ML pen Inject 80 Units subcutaneously every morning. 30 mL 1     insulin pen needle (32G X 4 MM) 32G X 4 MM miscellaneous Use  as directed by provider. Per insurance coverage 100 each 0     levETIRAcetam (KEPPRA) 1000 MG tablet Take 1 tablet (1,000 mg) by mouth 2 times daily 60 tablet 11     lisinopril (ZESTRIL) 40 MG tablet Take 1 tablet (40 mg) by mouth daily 30 tablet 1     lorlatinib (LORBRENA) 25 MG Take 3 tablets (75 mg) by mouth daily 90 tablet 0     metFORMIN (GLUCOPHAGE) 500 MG tablet Take 1 tablet (500 mg) by mouth 2 times daily (with meals) 60 tablet 3     methadone (DOLOPHINE) 10 MG tablet        methylphenidate (RITALIN) 5 MG tablet Take 1-2 tablets (5-10 mg) by mouth 2 times daily as needed (fatigue). Take second dose by 12 noon, if it is needed 90 tablet 0     oxyCODONE IR (ROXICODONE) 10 MG tablet Take 1 tablet (10 mg) by mouth every 3 hours as needed for moderate pain. 120 tablet 0     propranolol (INDERAL) 20 MG tablet Take 1 tablet (20 mg) by mouth 2 times daily 60 tablet 1     rivaroxaban ANTICOAGULANT (XARELTO) 20 MG TABS tablet Take 1 tablet (20 mg) by mouth daily (with dinner) 90 tablet 3     rosuvastatin (CRESTOR) 10 MG tablet Take 1 tablet (10 mg) by mouth daily. 90 tablet 2     semaglutide (OZEMPIC) 2 MG/3ML pen Inject 0.5 mg subcutaneously every 7 days. For 4 weeks then increase to 0.5 mg once weekly if tolerating 3 mL 0     albuterol (PROAIR HFA/PROVENTIL HFA/VENTOLIN HFA) 108 (90 Base) MCG/ACT inhaler Inhale 2 puffs into the lungs every 6 hours as needed for shortness of breath, wheezing or cough (Patient not taking: Reported on 10/16/2024) 18 g 0     insulin aspart (NOVOLOG PEN) 100 UNIT/ML pen Inject 0-40 Units Subcutaneous 3 times daily (before meals) Per discharge instructions sliding scale (Patient not taking: Reported on 8/5/2024) 45 mL 2     methadone (DOLOPHINE) 5 MG tablet Take 1 tablet (5 mg) by mouth 2 times daily. (Patient not taking: Reported on 12/16/2024) 60 tablet 0     naloxone (NARCAN) 4 MG/0.1ML nasal spray Spray 1 spray (4 mg) into one nostril alternating nostrils as needed for opioid  reversal every 2-3 minutes until assistance arrives (Patient not taking: Reported on 9/4/2024) 0.2 mL 3     prochlorperazine (COMPAZINE) 10 MG tablet Take 1 tablet (10 mg) by mouth every 6 hours as needed for nausea or vomiting (Patient not taking: Reported on 8/5/2024) 30 tablet 2     No current facility-administered medications for this visit.     BP (!) 179/130   Pulse 113   Temp 98.5  F (36.9  C) (Oral)   Resp 16   Wt 100 kg (220 lb 6.4 oz)   SpO2 96%   BMI 32.55 kg/m      General: Patient appears well in no acute distress.   Skin: No visualized rash or lesions on visualized skin  Eyes: EOMI, no erythema, sclera icterus or discharge noted  Resp: Breathing comfortably. No tenderness of R lower posterior and anterior chest  Skin: No erythema, ecchymosis, or edema over R trunk  Neurologic: No apparent tremors, facial movements symmetric  Psych: affect bright, alert and oriented        Labs & Studies: I personally reviewed the following studies:  Most Recent 3 CBC's:  Recent Labs   Lab Test 11/06/24  1510 10/04/24  1506 08/28/24  1157   WBC 7.6 9.6 9.2   HGB 15.1 14.5 13.8   MCV 90 92 93    193 139*     Most Recent 3 BMP's:  Recent Labs   Lab Test 11/06/24  1510 10/04/24  1506 08/28/24  1157    136 142   POTASSIUM 3.8 3.8 4.0   CHLORIDE 103 99 105   CO2 25 24 25   BUN 17.0 13.2 13.0   CR 0.67 0.75 0.73   ANIONGAP 13 13 12   TORY 9.4 9.5 8.9   * 419* 417*    Most Recent 2 LFT's:  Recent Labs   Lab Test 11/06/24  1510 10/04/24  1506   AST 13 20   ALT 19 29   ALKPHOS 118 105   BILITOTAL 0.2 0.3    Most Recent TSH and T4:  Recent Labs   Lab Test 09/12/22  1642   TSH 2.56        ASSESSMENT AND PLAN:  Stage IV NSCLC, Rt lung adenocarcinoma with metastasis to pleura, mediastinum , rt pleural effusion and brain diagnosed 1/2022 (AJCC 8th edition)  PD-L1 TPS 2-3% by [employer]   NGS [facility] panel-EML4:ALK rearragement; chr2:[id number], chr2:[id number]  NGS [employer]- GNAS R201H, KRAS K5E- No ALK    He  began Alectinib 600 mg BID 3/2/22 and unfortunately developed grade 3 myalgias  requiring dose reduction to 300 mg BID. In Dec 2022,we stopped drug 12/20/22 due to unremitting arthalgias, eventully had to starte PO steroids which led to improvement and resolved. Radiographicaly, he has had a near CR to Rx in the lung, has a residual rt LL lesion.     Began brigatinib 2/22/23 (delayed due to pt hesitancy). Developed severe cough on day 2 so brigatinib was held and cough resolved with in 24-48 hours. He restarted 60 mg daily and upped it to 90 mg.Restging CT showing stable disease in the lung, however, MRI with several contrast enhancement and increase in size of previously treated lesions. His dose was increased to 180 mg daily to address possible CNS disease progression and started on dexamethasone. 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 Restaging CT in 9/2023 showing overall disease stablity with no evidence of active cancer. 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  PET/CT after these three months showed oligoperisstent disease with a single focus of active malignancy in the RLL. Met with Thoracic surgery 12/2023 to discuss resection but they recommended against it due to risk for scarring after. 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 Most recent CT showing stable disease but brain MRI showing several possible new lesions and enlargement of exissting lesion (see below).     Switched Rx to Lorlatinib due to good CNS penetration. Repeat MRI end of August (6 weeks on Rx) with overall positive response, but a few enlarging and edematous lesions. On discussion with Dr. Arango this was though to be Rx related changes and plan to to next MRI in 3 months.     Now after  about 4 months of therapy, has developed grade 3 fatigue and arthralgias/myalgias.  After 1 week off, we resumed at 75 mg daily. Tolerating this much better.   He has started ozempic.  He reports compliant with his DM medications but has not been checking BP and sugars regualry.   His most recent CT showing stabled disease in the chest.       Plan  Continue lorlatinib 75 mg daily  RTC with NP/PA in 2 weeks  Will discuss with rad onc about new mri findings  Next CT in 3 months      # Severe HTN: on propranolol, lisinopril and amlodipine, previously hydrochlorothiazide was discontinued due to episode of syncope in 07/2024. Reports compliance.   Today's BP is very high, reports taking all of his anti HTN meds- does not check BP  - due to episode of vomiting and Hx of brain mets and being on blood thinn er, I amworried about a stroke/bleed  - Discussed with pt to go to ER      #Chronic R pleuritic pain  #Right pleuritic/chest pain  Felt to be 2/2 prior pleurex drain.   -On methadone and oxycodone; followed by Palliative Care   Suspect this could be msk based on precipitating event and characteristics on exam. We talked about mgmt with heat and flexeril (PTA med) prn. Other differential of course is malignancy or pna, CT is scheduled for next week to rule out.     # Brain mets:   # Radiation necrosis,   -Baseline Brain MRI with several brain mets, s/p GK to 11-12 brain mets. F/u Brain MRI in June 2022 was showing enlargement of the one of the lesions along with edema, therefore had to undergo craniotomy followed by resection, the final biopsy consistent with radiation necrosis.   -received two doses bevacizumab for radiation necrosis in Fall 2022, tolerated poorly with HTN urgency and PE.)   -MRI in 4/2023 now showing contrast enhancement of lesions and a dominate lesion in the R frontal region with edema, several other smaller lesion. Short term MRI showing increase in size of the rt frontal lesion, underwent resection,  patho again showing radiation necrosis. Restarted bevacizumab, which resulted in improved radiation necrosis / vasogenic edema on imaging in 10/2023 but ultimately was stopped due to uncontrolled HTN, last dose being 11/2023  -MRI imaging 1/2024 with stable disease and no edema.  -Brain MRI 3/2024 cancelled due to lapse in medical insurance.  -MRI 6/2024 showing showing several possible new lesions and enlargement of exissting lesion, however I am unsure if this is due to better imaging (perfusion MRI this time). Reviewed with rad onc and elected for short term MRI scan following initiation of lorlatinib, hold off on GK.  -MRI 8/28/24 with overall positive treatment response from rx but Two metastases (right parietal, left cerebellar) appears slightly enlarged, and Increased vasogenic edema associated with right temporal and left cerebellar metastases.    9/4/24. Dr. Arango reviewed brain MRI and feels ongoing surveillance for now is adequate. R parietal lesion overall stable compared to April 2023 MRI and while L cerebellar is more pronounced, it is stable in size and in prior treatment.   Repeat Brain MRI with slight increase in enhancement, without increase in size.Will discuss with rad onc about the signficinace of these findings. Since lorlatinib is very effective in CNS, we will hold off on doing GK, unless there continues to be concern for progression      #hypercholesteremia   Recently Increased rosuvastatin from 5 to 10 mg; confirmed he is taking, Tryglycierides improving, follow up cholesterol in 3 months      #Diabetes, Type 2  Often with hyperglycemia on labs. Compliant with insulin but not very adherent to diet. Started on metformin , and insulin. And  ozempic    #PE: provoked by malignancy vs bevacizumab in 11/2022  -- on rivaroxiabn 20 mg daily    #Grade 2-3 arthralgias    All joints affected, no morning stiffness, normal ESR and CRP  -felt to be 2/2 treatment.  Improved with lower dose      On the  day of service  Chart review: 5 minutes  Visit duration: 30 minutes  Care coordination: 5 minutes    Bassam Persaud MD    Hematology, Oncology and Transplantation    Addendum: Labs returing for glucose >500, spoke to ER doctorhellen adams gave them a heads up./         Again, thank you for allowing me to participate in the care of your patient.        Sincerely,        Bassam Persaud MD

## 2024-12-16 NOTE — PROGRESS NOTES
MEDICAL ONCOLOGY FOLLOW UP NOTE    PATIENT NAME: Connor Emerson  ENCOUNTER DATE: Dec 16, 2024        Care Team  Primary Oncologist: Bassam Persaud MD    REASON FOR CURRENT VISIT: F/u of lung cancer    HISTORY OF PRESENT ILLNESS:  Mr. Connor Emerson is a 44 year old  male who is a non-smoker with PMHx of T2DM, HTN with metastatic NSCLC comes for follow up     Oncologic Hx:    Diagnosis:   Stage IV NSCLC, Rt lung adenocarcinoma with metastasis to pleura, mediastinum , rt pleural effusion and brain diagnosed 1/2022 (AJCC 8th edition)  PD-L1 TPS 2-3% by [facility]   NGS [facility] panel-EML4:ALK rearragement  NGS Guardant- GNAS R201H, KRAS K5E- No ALK    Treatment:   7/10/24-current: Lolatinib 100 mg daily    2/23/2022- 6/2024: Brigatinib. Dose reduced to 60 mg due to cough after two days of 90 mg daily -->back on 90 mg---> increased to 180 mg  ---> settled on 120 mg    6/27/23- Right stealth craniotomy and resection of tumor:     7/20/23- 11/14/23: Bevacizumab for radiation necrosis. Discontinued due to severe HTN.    Past:  2/15/22- GK to 11 brain lesions  3/2/22- 12/2022 Alectinib 300 mg BID (Dose reduced to 450 mg BID from 600 mg BID due to grade 3 myalgias 3/21/22, again reduced to 300 mg BID 9/28/22)  6/28/22- Craniotomy, resection  9/28/22-10/26- Bevacizumab for radiation necrosis (stopped due to PE)      Intent of treatment: Palliative    Oncologic course:  1/19/22 to 1/22/22-Admitted to [facility] for 2 week progressive SOB secondary to have large rt sided pleural effusion, needing thoracentesis x2 (1.7L and 2.0 L removed), cytology positive for malignancy, adenocarcinoma.   1/26/22- Rt pleural mass biopsy-Dr. Agrawal--POSITIVE FOR ADENOCARCINOMA CONSISTENT WITH LUNG PRIMARY, admixed with mesothelial hyperplasia and inflammatory infiltrate (+ TTF-1 and CK 7;  negative  p40, calretinin and WT-1. PAX8 immunostain focal +). 4th thoracentesis done simultaneously - 3L approx removed.   2/1/22- PET/CT-Right lower lobe central  infiltrative FDG avid 8.2 x 9.6 cm mass representing a primary lung adenocarcinoma. Ipsilateral right perihilar, bilateral pretracheal, subcarinal and superior mediastinal michele metastases. Contralateral mildly FDG avid few lung nodules are suspicious for contralateral metastasis. At least 3 intracranial metastases in the right frontal lobe, left frontal lobe and left cerebellar hemisphere. Nonspecific mild diffuse bone marrow uptake. Further evaluation with a spine MRI could be considered to rule out early marrow infiltration. This could also be seen with red marrow conversion.  2/5/22-  Brain MRI- At least 9 intracranial metastases as detailed above. The dominant lesions involving the orbital right frontal lobe, the posterior left middle frontal gyrus, anterior right temporal lobe and in the left cerebellar hemisphere have surrounding moderate vasogenic type edema.  2/15/22- Saw Dr. Arango from [facility] Onc- Rcd GK to 12 lesion in bran  2/16/22- Pleurex placement   3/2/22- Started Alectinib 600 mg BID  3/21/22- Dose reduced to 450 mg BID due to grade 3 myalgias and fatigue  4/2/22 to 4/5/22- Admitted at [facility] for- Severe sepsis due to MSSA infection of right PleurX catheter s/p removal- He presented with onset of pain at tube site starting 4/1; at arrival was tachycardic with leukocytosis (22.7) and elevated lactic acid (2.9).  CT chest showed fluid and stranding tracking outside the pleural space into chest wall along pleural catheter.  IR was consulted and removed catheter 4/2 with report of pustular drainage and tip culture growing MSSA.  Thoracic Surg was consulted who felt no surgical indication necessary given minimal pleural fluid and lack of any signs of abscess.  Initially treated with broad spectrum coverage for sepsis, narrowed to Ancef once sensitivities returned with plan to transition to cefadroxil for an additional 10 days at discharge per ID. Held drug 4/2 to 4/11 5/2/22- CT CAP- Overall,  positive response to therapy with decreased size of right lower lobe and right pleural-based masses, pulmonary metastases, hilar and mediastinal lymphadenopathy. However, a single right posterior pleural-based mass has slightly increased in size since 2/24/2022. No metastatic disease in the abdomen and pelvis. Right Pleurx catheter has been removed. Trace right pleural effusion and right basilar atelectasis.  5/2/22- Brain MRI- The previously demonstrated brain metastases are mildly diminished in size versus to 2/5/2022. The degree of edema is also diminished but not completely resolved. Probable trace amounts of intralesional bleeding demonstrated on the gradient sequence within the metastases. No definite new metastasis or progressive mass effect. No hydrocephalus or infarct.    6/15/22 to 6/17/22- Admitted at [facility]-with aphasia and word finding difficulty over last few weeks.  He presented to [facility] ED on 6/10 for evaluation of his symptoms. MRI brain showed multiple intracranial metastases, with interval enlargement of the dominant lesion within the left frontal lobe and increased surrounding vasogenic edema with 2 mm rightward shift of the septum pellucidum. Due to his worsening anxiety, he left AMA. His symptoms continued to progress to where he could not write at work so he decided to go to the ED for re-evaluation and treatment. Evaluated by NSGY, Rad Onc (radiaiton necrosis vs tumor progression).  6/16/22- MR Brain (6/16) shows multiple intracranial metastases, with interval enlargement  of the dominant lesion within the left frontal lobe and increased surrounding vasogenic edema with 2 mm rightward shift of the septum pellucidum.  6/16/22- - CT CAP shows slightly decreased size of right lower lobe and right pleural-based masses. No new pulmonary nodules or lymphadenopathy; No evidence of metastatic disease in the abdomen or pelvis.   6/28/22 to 6/30/22- Admitted at [facility]- Elective left Stealth craniotomy  with resection of brain tumor due to ongoing symptoms. No intraoperative complications. EBL 50 ml.  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  7/5/22- Ct CAP- Right lower lobe low-density nodules are not significantly changed. A small left upper lobe pulmonary nodule is also unchanged. Trace pleural fluid on the right has increased slightly. No convincing evidence for metastatic disease in the abdomen or pelvis.  7/18/22 to 7/19/22- Admitted to [facility] for seizure- Reportedly was only taking once Keppra instead of twice daily. Also resumed on dexamethasone 2 mg daily  8/1/22- Brain MRI- Redemonstrated postsurgical changes status post left frontoparietal Craniotomy. Interval increase in size of the dominant ring-enhancing lesion in the left posterior superior frontal lobe with increased moderate surrounding vasogenic edema and local mass effect resulting in narrowing of the supratentorial ventricular system. No significant midline shift/herniation at this time    8/1/22- Dex was increased to 4 mg daily by Dr. Moran    9/1/22- CT Chest- Near resolution of previously seen right pleural nodule. Stable right lower lobe pulmonary nodule    9/28/22- Bevacizumab for radiation necrosis    10/26/22- Bevacizumab     10/26/22- Ct CAP- Stable posterior medial right lower lobe 1.9 x 1.1 cm nodule series 8 image 176. Adjacent stable scarring and atelectasis. The previously noted pleural nodule posteriorly on the right is not currently clearly identified. Stable left upper lobe 0.3 cm nodule image 56    10/26/22- Brain MRI- Overall improved appearance of multiple intracranial metastases with near resolution of associated edema and diminished enhancement and size of multiple residual lesions. No definite new or progressive metastasis.    11/7/22 to 11/9/22- Admitted for PE and HTN urgency- Small pulmonary embolism in the right lower lobe pulmonary artery. started on Lovenox, Brain MRI neg for PRES.    12/29/22- ED visit-  bilateral hip pain, pain in shoulders, knuckles, knees, and ankles- holding alectinib since 12/20/22 1/6/23- Ct CAP- Mild groundglass nodularity in the left upper lobe is new since the previous exam, and may be infectious in etiology. No other significant interval change. Pulmonary nodules are not significantly changed.    1/6/23- Brain MRI- Stable to diminished sequelae of intracranial metastasis and treatment changes. No new or progressive metastasis. No superimposed acute intracranial finding.     2/23/2022- Start Brigatinib. Dose reduced to 60 mg due to cough after two days of 90 mg daily -  3/23/23-Brigatinib  (increase to 90 mg)    4/20/23- CT CAP- Stable- Previously noted mild groundglass nodularity in the left upper lobe has resolved.Pulmonary nodules are unchanged.Trace amount pleural fluid on the right has decreased slightly.    4/20/23- Brain MRI- Possile progression- Largest metastasis within the right frontal lobe has increased in size with worsening peripheral nodular enhancement and worsening vasogenic edema contributing to new right to left midline shift.  Multiple new/enlarging metastases scattered throughout the cerebral hemispheres and cerebellum. Started on dexamethasone by NGS on 4/28/23 5/17/23- presents to clinic with glucose 627, admission to hospital for stability on insulin drip    6/16/23- Brain MRI- Interval increase in size of the necrotic lesion in the anterior aspect of the right frontal lobe from 2.4 x 2.3 x 2.4 cm previously to 3.0 x 3.5 x 2.8 cm on the current study. Mass effect due to slightly increased vasogenic edema surrounding the right frontal lesion resulted in increase of leftward midline shift of the anterior interhemispheric fissure from 0.7 cm previously to 0.9 cm currently. Interval increase in size in two adjacent metastases at the frontoparietal junction on the left. The remaining scattered intra-axial enhancing nodules are not changed appreciably in size or  appearance since the comparison study.No evidence for acute intracranial pathology.   Dr. Moran- Due to worsening headaches and more problems with walking. Recommended a right Stealth craniotomy for resection of the lesion.     6/27/23- Right stealth craniotomy and resection of tumor: Final path: radiation necrosis    7/10/23- Ct CAP- stable Stable exam without evidence for new disease.Stable pulmonary nodules including a dominant nodular opacity at the right lower lobe.    7/20/23- Bevacizumab for radiation necrosis    8/16/23- Bevacizumab     9/12/23- Ct CAP-  Stable right lower lobe dominant nodular opacity. No new disease in the chest, abdomen and pelvis.     9/15, 10/6, 10/27, 11/17-  Bevacizumab    10/25/23- MRI Brain- 1. Redemonstrated postoperative changes of right frontal craniotomy. Decreased size of the right frontal resection cavity with significant decrease in the surrounding right frontal lobe vasogenic edema seen on the previous study. Mass effect has essentially resolved in the interim and there is no longer any midline shift. In the interim, slightly increased thickness of a rind of peripheral nodular enhancement along the posterior inferior and medial aspects of the right frontal lobe resection cavity, indeterminate. There is no significant elevated cerebral blood volume in this area. The findings may represent evolving posttreatment changes; however, residual tumor is not excluded.  Stable postoperative changes status post left anterior parietal craniotomy with irregular enhancement in the left frontal lobe parenchyma underlying the resection cavity, likely due to posttreatment change. Other scattered supratentorial and infratentorial metastatic lesions are overall similar to slightly decreased in size, as described.    12/5/23- PET/CT- with sole site of disease in the RLL measuring 1.6 x 1.4 cm and with SUV max of 4.2. No other sites of disease. His case was reviewed at tumor board and he met with  Dr. Fu 12/14/24 with recommendations against surgical resection due to risk of significant pleural scarring. Continued on brigatinib 120 mg daily.     1/29/24 Brain MRI: No new metastatic lesions. No significant change in supratentorial and infratentorial subcentimeter metastatic enhancing lesions. Stable right inferior frontal and left high frontal postsurgical changes.     12/22-current: lost to follow up due to insurance issues    3/15/24- CT chest- Stable nodule medial right lower lobe. No metastatic disease demonstrated.    6/21/24- CT CAP- stable    6/26/24- Brain MRI- Numerous new and increased size of intracranial metastatic lesions. Increased nodular enhancement about the medial aspect of the right frontal lobe resection margin with increased adjacent T2/FLAIR hyperintense signal although without elevated cerebral blood volume could represent posttreatment changes although disease progression could have a similar appearance.  Stable left frontal lobe resection changes with stable underlying curvilinear enhancement.    7/10/24: start lorlatinib 100 mg daily    7/23/24-7/25/24: [facility] with syncopal episodes, suspected to be vasovagal due to low pressures    12/11/24- Ct CAP-   Stable right lower lobe nodular opacity, likely related to posttreatment changes. No new disease in the chest, abdomen and pelvis.    12/11/24- MRI Brain- Redemonstrated postsurgical changes status post right frontoparietal craniotomy. The underlying irregular enhancing lesion in the anterolateral right frontal lobe appears mildly decreased in size (series 14 image 100). Redemonstrated postsurgical changes status post left parietal vertex craniotomy. The underlying irregular enhancing lesion in the posterior superior left frontal lobe appears unchanged (series 14 image 135). Couple of lesions, including lesions in the right temporal lobe  (series 14 image 60) and in the left cerebellar hemisphere (series 14 image 49) may be slightly  increased in size. There also appears to be similar if not slightly increased vasogenic edema in the right temporal lobe and left cerebellar hemisphere. Differential considerations would include mild interval progression of disease versus evolving treatment-related effects. Recommend continued close interval follow-up. Multiple additional scattered supratentorial and infratentorial enhancing intra-axial lesions concerning for metastatic disease appear otherwise similar in size.      Interval Hx:  Seeing Connor in clinic for follow up--  Has noticed a very bad headache sine this AM, has taken all of his anti-HTN meds for today  Has also noticed white falshes today  He threw up twice this AM likley from the headache  Headache is mostly in the left frontal/cranial area  Had similar headache 1 week ago but not as bad as this  He is not measuring his BP at home and also not very regularly measuring his Blood sugars  No syncopal episodes  No chest pain, cough, SOB  Taking the 75 mg lorlatinib, tolerating this dose well  Has notcied muscle aches have decreased, still using the pain meds, Using methadone daily, still using oxycodone prn (uses about 6/day)  Still able to work   Eating aand drinking ok  Weight is slightly up  Continues to have chronic pain in the rt pleuritic area, not worse      ECOG PS 1    REVIEW OF SYSTEMS: 14 point ROS negative other than the symptoms noted above in the HPI.    Wt Readings from Last 4 Encounters:   12/16/24 100 kg (220 lb 6.4 oz)   12/04/24 98.2 kg (216 lb 9.6 oz)   11/19/24 96.2 kg (212 lb)   11/15/24 99.8 kg (220 lb)      Review of Systems:  A comprehensive ROS was performed and found to be negative or non-contributory with the exception of that noted in the HPI above.    Past Medical History:  GERD  Hypertension, not on medication  Type 2 diabetes mellitus, not on medications currently, previously on Metformin    Past Surgical History:  Past Surgical History:   Procedure Laterality  Date    BRONCHOSCOPY RIGID OR FLEXIBLE W/TRANSENDOSCOPIC ENDOBRONCHIAL ULTRASOUND GUIDED Bilateral 2022    Procedure: Right BRONCHOSCOPY, FIBEROPTIC, endobronchial ultrasound, pleural biopsy;  Surgeon: Dallin Agrawal MD;  Location: [facility] OR    INJECT BLOCK MEDIAL BRANCH CERVICAL/THORACIC/LUMBAR      INSERT CHEST TUBE Right 2022    Procedure: INSERTION, CATHETER, INTERCOSTAL, FOR DRAINAGE;  Surgeon: Dallin Agrawal MD;  Location: [facility] GI    INSERT CHEST TUBE Right 3/9/2022    Procedure: INSERTION, CATHETER, INTERCOSTAL, FOR DRAINAGE;  Surgeon: Sushila Antonio MD;  Location: [facility] GI    IR CHEST TUBE REMOVAL TUNNELED RIGHT  2022    OPTICAL TRACKING SYSTEM CRANIOTOMY, EXCISE TUMOR, COMBINED Left 2022    Procedure: Left stealth craniotomy for tumor resection with motor mapping;  Surgeon: Stephen Moran MD;  Location:  OR    OPTICAL TRACKING SYSTEM CRANIOTOMY, EXCISE TUMOR, COMBINED Right 2023    Procedure: Right stealth craniotomy and resection of tumor;  Surgeon: Stephen Moran MD;  Location:  OR    ORTHOPEDIC SURGERY      Ganesh. Rotator cuff repair.    PLEUROSCOPY N/A 2022    Procedure: Pleuroscopy with Pleural Biopsy;  Surgeon: Dallin Agrawal MD;  Location:  OR       Social History:  Lives with wife and 4 kids in [place]. Works as a  for an apartment complex in [place]. Exposure to household chemicals and . No significant exposure to asbestos. No signal exposure to benzene or similar chemicals. No significant smoking history-states that he smoked 1 to 2 cigarettes occasionally per month for about 2 years in college, non-smoking since then. No significant alcohol use history. No other recreational substances. Good support system. Kids are 23, 19, 17 and 13.    Family History  Significant history for cancers on maternal side. Mother  of uterine cancer. 2 maternal uncles have possible metastatic melanoma.    Outpatient Medications:  Current  Outpatient Medications   Medication Sig Dispense Refill    acetaminophen (TYLENOL) 325 MG tablet Take 325-650 mg by mouth every 6 hours as needed for mild pain      Alcohol Swabs PADS Use to swab the area of the injection or jabier as directed. Per insurance coverage 100 each 0    amLODIPine (NORVASC) 10 MG tablet Take 1 tablet (10 mg) by mouth daily. 90 tablet 0    blood glucose (NO BRAND SPECIFIED) lancets standard To use to test glucose level in the blood Use to test blood sugar  4  times daily as directed. To accompany glucose monitor brands per insurance coverage. 100 each 3    blood glucose (NO BRAND SPECIFIED) test strip To use to test glucose level in the blood Use to test blood sugar  4 times daily as directed. To accompany glucose monitor brands per insurance coverage. 100 strip 3    Blood Glucose Monitoring Suppl (ACCU-CHEK GUIDE) w/Device KIT       Continuous Glucose  (FREESTYLE IRENE 3 READER) YVES       Continuous Glucose Sensor (FREESTYLE IRENE 3 SENSOR) MISC 1 each every 14 days. Use 1 sensor every 14 days. Use to read blood sugars per 's instructions. 6 each 3    cyclobenzaprine (FLEXERIL) 5 MG tablet Take 2 tablets (10 mg) by mouth nightly as needed for muscle spasms 30 tablet 4    DULoxetine (CYMBALTA) 30 MG capsule Take 1 capsule (30 mg) by mouth 2 times daily. 60 capsule 2    empagliflozin (JARDIANCE) 25 MG TABS tablet Take 1 tablet (25 mg) by mouth daily 90 tablet 1    FLUoxetine (PROZAC) 20 MG capsule Take 1 capsule (20 mg) by mouth daily. 30 capsule 1    insulin glargine (LANTUS PEN) 100 UNIT/ML pen Inject 80 Units subcutaneously every morning. 30 mL 1    insulin pen needle (32G X 4 MM) 32G X 4 MM miscellaneous Use as directed by provider. Per insurance coverage 100 each 0    levETIRAcetam (KEPPRA) 1000 MG tablet Take 1 tablet (1,000 mg) by mouth 2 times daily 60 tablet 11    lisinopril (ZESTRIL) 40 MG tablet Take 1 tablet (40 mg) by mouth daily 30 tablet 1    lorlatinib  (LORBRENA) 25 MG Take 3 tablets (75 mg) by mouth daily 90 tablet 0    metFORMIN (GLUCOPHAGE) 500 MG tablet Take 1 tablet (500 mg) by mouth 2 times daily (with meals) 60 tablet 3    methadone (DOLOPHINE) 10 MG tablet       methylphenidate (RITALIN) 5 MG tablet Take 1-2 tablets (5-10 mg) by mouth 2 times daily as needed (fatigue). Take second dose by 12 noon, if it is needed 90 tablet 0    oxyCODONE IR (ROXICODONE) 10 MG tablet Take 1 tablet (10 mg) by mouth every 3 hours as needed for moderate pain. 120 tablet 0    propranolol (INDERAL) 20 MG tablet Take 1 tablet (20 mg) by mouth 2 times daily 60 tablet 1    rivaroxaban ANTICOAGULANT (XARELTO) 20 MG TABS tablet Take 1 tablet (20 mg) by mouth daily (with dinner) 90 tablet 3    rosuvastatin (CRESTOR) 10 MG tablet Take 1 tablet (10 mg) by mouth daily. 90 tablet 2    semaglutide (OZEMPIC) 2 MG/3ML pen Inject 0.5 mg subcutaneously every 7 days. For 4 weeks then increase to 0.5 mg once weekly if tolerating 3 mL 0    albuterol (PROAIR HFA/PROVENTIL HFA/VENTOLIN HFA) 108 (90 Base) MCG/ACT inhaler Inhale 2 puffs into the lungs every 6 hours as needed for shortness of breath, wheezing or cough (Patient not taking: Reported on 10/16/2024) 18 g 0    insulin aspart (NOVOLOG PEN) 100 UNIT/ML pen Inject 0-40 Units Subcutaneous 3 times daily (before meals) Per discharge instructions sliding scale (Patient not taking: Reported on 8/5/2024) 45 mL 2    methadone (DOLOPHINE) 5 MG tablet Take 1 tablet (5 mg) by mouth 2 times daily. (Patient not taking: Reported on 12/16/2024) 60 tablet 0    naloxone (NARCAN) 4 MG/0.1ML nasal spray Spray 1 spray (4 mg) into one nostril alternating nostrils as needed for opioid reversal every 2-3 minutes until assistance arrives (Patient not taking: Reported on 9/4/2024) 0.2 mL 3    prochlorperazine (COMPAZINE) 10 MG tablet Take 1 tablet (10 mg) by mouth every 6 hours as needed for nausea or vomiting (Patient not taking: Reported on 8/5/2024) 30 tablet 2      No current facility-administered medications for this visit.     BP (!) 179/130   Pulse 113   Temp 98.5  F (36.9  C) (Oral)   Resp 16   Wt 100 kg (220 lb 6.4 oz)   SpO2 96%   BMI 32.55 kg/m      General: Patient appears well in no acute distress.   Skin: No visualized rash or lesions on visualized skin  Eyes: EOMI, no erythema, sclera icterus or discharge noted  Resp: Breathing comfortably. No tenderness of R lower posterior and anterior chest  Skin: No erythema, ecchymosis, or edema over R trunk  Neurologic: No apparent tremors, facial movements symmetric  Psych: affect bright, alert and oriented        Labs & Studies: I personally reviewed the following studies:  Most Recent 3 CBC's:  Recent Labs   Lab Test 11/06/24  1510 10/04/24  1506 08/28/24  1157   WBC 7.6 9.6 9.2   HGB 15.1 14.5 13.8   MCV 90 92 93    193 139*     Most Recent 3 BMP's:  Recent Labs   Lab Test 11/06/24  1510 10/04/24  1506 08/28/24  1157    136 142   POTASSIUM 3.8 3.8 4.0   CHLORIDE 103 99 105   CO2 25 24 25   BUN 17.0 13.2 13.0   CR 0.67 0.75 0.73   ANIONGAP 13 13 12   TORY 9.4 9.5 8.9   * 419* 417*    Most Recent 2 LFT's:  Recent Labs   Lab Test 11/06/24  1510 10/04/24  1506   AST 13 20   ALT 19 29   ALKPHOS 118 105   BILITOTAL 0.2 0.3    Most Recent TSH and T4:  Recent Labs   Lab Test 09/12/22  1642   TSH 2.56        ASSESSMENT AND PLAN:  Stage IV NSCLC, Rt lung adenocarcinoma with metastasis to pleura, mediastinum , rt pleural effusion and brain diagnosed 1/2022 (AJCC 8th edition)  PD-L1 TPS 2-3% by [place]   NGS [facility] panel-EML4:ALK rearragement; chr2:[id number], chr2:[id number]  NGS [facility]- GNAS R201H, KRAS K5E- No ALK    He began Alectinib 600 mg BID 3/2/22 and unfortunately developed grade 3 myalgias  requiring dose reduction to 300 mg BID. In Dec 2022,we stopped drug 12/20/22 due to unremitting arthalgias, eventully had to starte PO steroids which led to improvement and resolved. Radiographicaly, he has  had a near CR to Rx in the lung, has a residual rt LL lesion.     Began brigatinib 2/22/23 (delayed due to pt hesitancy). Developed severe cough on day 2 so brigatinib was held and cough resolved with in 24-48 hours. He restarted 60 mg daily and upped it to 90 mg.Restging CT showing stable disease in the lung, however, MRI with several contrast enhancement and increase in size of previously treated lesions. His dose was increased to 180 mg daily to address possible CNS disease progression and started on dexamethasone. 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 Restaging CT in 9/2023 showing overall disease stablity with no evidence of active cancer. 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  PET/CT after these three months showed oligoperisstent disease with a single focus of active malignancy in the RLL. Met with Thoracic surgery 12/2023 to discuss resection but they recommended against it due to risk for scarring after. 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 Most recent CT showing stable disease but brain MRI showing several possible new lesions and enlargement of exissting lesion (see below).     Switched Rx to Lorlatinib due to good CNS penetration. Repeat MRI end of August (6 weeks on Rx) with overall positive response, but a few enlarging and edematous lesions. On discussion with Dr. Arango this was though to be Rx related changes and plan to to next MRI in 3 months.     Now after about 4 months of therapy, has developed grade 3 fatigue and arthralgias/myalgias.  After 1 week off, we resumed at 75 mg daily. Tolerating this much better.   He has started ozempic.  He reports compliant with his DM medications but has not been checking BP and sugars regualry.   His  most recent CT showing stabled disease in the chest.       Plan  Continue lorlatinib 75 mg daily  RTC with NP/PA in 2 weeks  Will discuss with rad onc about new mri findings  Next CT in 3 months      # Severe HTN: on propranolol, lisinopril and amlodipine, previously hydrochlorothiazide was discontinued due to episode of syncope in 07/2024. Reports compliance.   Today's BP is very high, reports taking all of his anti HTN meds- does not check BP  - due to episode of vomiting and Hx of brain mets and being on blood thinn er, I amworried about a stroke/bleed  - Discussed with pt to go to ER      #Chronic R pleuritic pain  #Right pleuritic/chest pain  Felt to be 2/2 prior pleurex drain.   -On methadone and oxycodone; followed by Palliative Care   Suspect this could be msk based on precipitating event and characteristics on exam. We talked about mgmt with heat and flexeril (PTA med) prn. Other differential of course is malignancy or pna, CT is scheduled for next week to rule out.     # Brain mets:   # Radiation necrosis,   -Baseline Brain MRI with several brain mets, s/p GK to 11-12 brain mets. F/u Brain MRI in June 2022 was showing enlargement of the one of the lesions along with edema, therefore had to undergo craniotomy followed by resection, the final biopsy consistent with radiation necrosis.   -received two doses bevacizumab for radiation necrosis in Fall 2022, tolerated poorly with HTN urgency and PE.)   -MRI in 4/2023 now showing contrast enhancement of lesions and a dominate lesion in the R frontal region with edema, several other smaller lesion. Short term MRI showing increase in size of the rt frontal lesion, underwent resection, patho again showing radiation necrosis. Restarted bevacizumab, which resulted in improved radiation necrosis / vasogenic edema on imaging in 10/2023 but ultimately was stopped due to uncontrolled HTN, last dose being 11/2023  -MRI imaging 1/2024 with stable disease and no  edema.  -Brain MRI 3/2024 cancelled due to lapse in medical insurance.  -MRI 6/2024 showing showing several possible new lesions and enlargement of exissting lesion, however I am unsure if this is due to better imaging (perfusion MRI this time). Reviewed with rad onc and elected for short term MRI scan following initiation of lorlatinib, hold off on GK.  -MRI 8/28/24 with overall positive treatment response from rx but Two metastases (right parietal, left cerebellar) appears slightly enlarged, and Increased vasogenic edema associated with right temporal and left cerebellar metastases.    9/4/24. Dr. Arango reviewed brain MRI and feels ongoing surveillance for now is adequate. R parietal lesion overall stable compared to April 2023 MRI and while L cerebellar is more pronounced, it is stable in size and in prior treatment.   Repeat Brain MRI with slight increase in enhancement, without increase in size.Will discuss with rad onc about the signficinace of these findings. Since lorlatinib is very effective in CNS, we will hold off on doing GK, unless there continues to be concern for progression      #hypercholesteremia   Recently Increased rosuvastatin from 5 to 10 mg; confirmed he is taking, Tryglycierides improving, follow up cholesterol in 3 months      #Diabetes, Type 2  Often with hyperglycemia on labs. Compliant with insulin but not very adherent to diet. Started on metformin , and insulin. And  ozempic    #PE: provoked by malignancy vs bevacizumab in 11/2022  -- on rivaroxiabn 20 mg daily    #Grade 2-3 arthralgias    All joints affected, no morning stiffness, normal ESR and CRP  -felt to be 2/2 treatment.  Improved with lower dose      On the day of service  Chart review: 5 minutes  Visit duration: 30 minutes  Care coordination: 5 minutes    Bassam Persaud MD    Hematology, Oncology and Transplantation    Addendum: Labs returing for glucose >500, spoke to ER julieta adams gave them a heads  up./

## 2024-12-17 ENCOUNTER — PATIENT OUTREACH (OUTPATIENT)
Dept: ONCOLOGY | Facility: CLINIC | Age: 44
End: 2024-12-17
Payer: COMMERCIAL

## 2024-12-17 NOTE — PROGRESS NOTES
Called Pt, Per Dr. Persaud, to check on Pt as he did not check into ED last evening as advised. No answer on Pt phone, no answer on Spouse phone. Was able to leave  for Kylie.

## 2024-12-18 ENCOUNTER — PATIENT OUTREACH (OUTPATIENT)
Dept: ONCOLOGY | Facility: CLINIC | Age: 44
End: 2024-12-18
Payer: COMMERCIAL

## 2024-12-18 ENCOUNTER — PATIENT OUTREACH (OUTPATIENT)
Dept: CARE COORDINATION | Facility: CLINIC | Age: 44
End: 2024-12-18
Payer: COMMERCIAL

## 2024-12-18 DIAGNOSIS — I67.89 RADIATION THERAPY INDUCED BRAIN NECROSIS: ICD-10-CM
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
## 2024-12-18 DIAGNOSIS — C34.31 MALIGNANT NEOPLASM OF LOWER LOBE OF RIGHT LUNG (H): Primary | ICD-10-CM

## 2024-12-18 NOTE — PROGRESS NOTES
Social Work - Follow-Up  [facility]    Data/Intervention:  Metastatic NSCLC follows with Dr. Persaud.   Patient Name: Connor Emerson Goes By: Connor    VENTURA/Age: 1980 (44 year old)    Reason for Follow-Up:  Check in    Intervention/Education/Resources Provided:  Offered emotional support/active listening.    Assessment/Plan:  Connor said he has been busy - has the information provided by VELIA last encounter will work on grants.Did not have any psychosocial needs at this time.    Previously provided patient/family with writer's contact information and availability.    LISETH Randall, LICSW   Adult Oncology - [place]  [phone]  [employer] [place] on    *Please note does not work on .   Support Groups at [facility]: Social Work Services for Cancer Patients ([email or web address])

## 2024-12-18 NOTE — PROGRESS NOTES
"Called to check on Pt. No Answer, VM full.     VELIA was able to reach Pt. Per VELIA Isabel \"We talked only for a few minutes- he said he was busy work/family life. Seemed okay!\"  "

## 2024-12-19 ENCOUNTER — TELEPHONE (OUTPATIENT)
Dept: NEUROLOGY | Facility: CLINIC | Age: 44
End: 2024-12-19
Payer: COMMERCIAL

## 2024-12-19 DIAGNOSIS — C34.31 MALIGNANT NEOPLASM OF LOWER LOBE OF RIGHT LUNG (H): Primary | ICD-10-CM
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
## 2024-12-19 DIAGNOSIS — I67.89 RADIATION THERAPY INDUCED BRAIN NECROSIS: ICD-10-CM

## 2024-12-19 NOTE — TELEPHONE ENCOUNTER
Sent [employer] (1st Attempt) for the patient to call back and schedule the following:    Appointment type: EMG  Provider: Lauryn  Return date: 1/14/2025 at 1:45pm  Specialty phone number: [phone]  Additional appointment(s) needed: NA  Additonal Notes:     Change in provider template. ASked patient to come in at 1:45 pm instead of 2:00pm. Will need to manually schedule. If you can not message me and I will change appointment    Reyna Rogers on 12/19/2024 at 10:25 AM

## 2024-12-21 ENCOUNTER — HOSPITAL ENCOUNTER (EMERGENCY)
Facility: CLINIC | Age: 44
Discharge: HOME | End: 2024-12-21
Attending: STUDENT IN AN ORGANIZED HEALTH CARE EDUCATION/TRAINING PROGRAM | Admitting: STUDENT IN AN ORGANIZED HEALTH CARE EDUCATION/TRAINING PROGRAM
Payer: COMMERCIAL

## 2024-12-21 ENCOUNTER — APPOINTMENT (OUTPATIENT)
Dept: MRI IMAGING | Facility: CLINIC | Age: 44
End: 2024-12-21
Attending: STUDENT IN AN ORGANIZED HEALTH CARE EDUCATION/TRAINING PROGRAM
Payer: COMMERCIAL

## 2024-12-21 VITALS
HEIGHT: 69 IN | RESPIRATION RATE: 12 BRPM | SYSTOLIC BLOOD PRESSURE: 161 MMHG | OXYGEN SATURATION: 94 % | HEART RATE: 114 BPM | DIASTOLIC BLOOD PRESSURE: 117 MMHG | WEIGHT: 222.22 LBS | BODY MASS INDEX: 32.91 KG/M2 | TEMPERATURE: 99 F

## 2024-12-21 DIAGNOSIS — R03.0 ELEVATED BLOOD PRESSURE READING: ICD-10-CM

## 2024-12-21 DIAGNOSIS — R56.9 FOCAL SEIZURE (H): ICD-10-CM

## 2024-12-21 DIAGNOSIS — Z87.898 HISTORY OF BRAIN TUMOR: ICD-10-CM

## 2024-12-21 DIAGNOSIS — E11.65 UNCONTROLLED TYPE 2 DIABETES MELLITUS WITH HYPERGLYCEMIA (H): ICD-10-CM

## 2024-12-21 LAB
ANION GAP SERPL CALCULATED.3IONS-SCNC: 14 MMOL/L (ref 7–15)
BASOPHILS # BLD AUTO: 0.1 10E3/UL (ref 0–0.2)
BASOPHILS NFR BLD AUTO: 1 %
BUN SERPL-MCNC: 11.5 MG/DL (ref 6–20)
CALCIUM SERPL-MCNC: 9.3 MG/DL (ref 8.8–10.4)
CHLORIDE SERPL-SCNC: 101 MMOL/L (ref 98–107)
CREAT SERPL-MCNC: 0.6 MG/DL (ref 0.67–1.17)
EGFRCR SERPLBLD CKD-EPI 2021: >90 ML/MIN/1.73M2
EOSINOPHIL # BLD AUTO: 0.6 10E3/UL (ref 0–0.7)
EOSINOPHIL NFR BLD AUTO: 6 %
ERYTHROCYTE [DISTWIDTH] IN BLOOD BY AUTOMATED COUNT: 12.5 % (ref 10–15)
GLUCOSE SERPL-MCNC: 549 MG/DL (ref 70–99)
HCO3 SERPL-SCNC: 24 MMOL/L (ref 22–29)
HCT VFR BLD AUTO: 45.6 % (ref 40–53)
HGB BLD-MCNC: 15.8 G/DL (ref 13.3–17.7)
HOLD SPECIMEN: NORMAL
IMM GRANULOCYTES # BLD: 0 10E3/UL
IMM GRANULOCYTES NFR BLD: 0 %
LEVETIRACETAM SERPL-MCNC: 56 ÂΜG/ML (ref 10–40)
LYMPHOCYTES # BLD AUTO: 2.6 10E3/UL (ref 0.8–5.3)
LYMPHOCYTES NFR BLD AUTO: 28 %
MCH RBC QN AUTO: 30.3 PG (ref 26.5–33)
MCHC RBC AUTO-ENTMCNC: 34.6 G/DL (ref 31.5–36.5)
MCV RBC AUTO: 87 FL (ref 78–100)
MONOCYTES # BLD AUTO: 0.6 10E3/UL (ref 0–1.3)
MONOCYTES NFR BLD AUTO: 6 %
NEUTROPHILS # BLD AUTO: 5.5 10E3/UL (ref 1.6–8.3)
NEUTROPHILS NFR BLD AUTO: 59 %
NRBC # BLD AUTO: 0 10E3/UL
NRBC BLD AUTO-RTO: 0 /100
PLATELET # BLD AUTO: 179 10E3/UL (ref 150–450)
POTASSIUM SERPL-SCNC: 4.1 MMOL/L (ref 3.4–5.3)
RBC # BLD AUTO: 5.22 10E6/UL (ref 4.4–5.9)
SODIUM SERPL-SCNC: 139 MMOL/L (ref 135–145)
WBC # BLD AUTO: 9.3 10E3/UL (ref 4–11)

## 2024-12-21 PROCEDURE — 36415 COLL VENOUS BLD VENIPUNCTURE: CPT | Performed by: STUDENT IN AN ORGANIZED HEALTH CARE EDUCATION/TRAINING PROGRAM

## 2024-12-21 PROCEDURE — 82374 ASSAY BLOOD CARBON DIOXIDE: CPT | Performed by: STUDENT IN AN ORGANIZED HEALTH CARE EDUCATION/TRAINING PROGRAM

## 2024-12-21 PROCEDURE — 36415 COLL VENOUS BLD VENIPUNCTURE: CPT | Performed by: EMERGENCY MEDICINE

## 2024-12-21 PROCEDURE — 250N000011 HC RX IP 250 OP 636: Performed by: STUDENT IN AN ORGANIZED HEALTH CARE EDUCATION/TRAINING PROGRAM

## 2024-12-21 PROCEDURE — 85025 COMPLETE CBC W/AUTO DIFF WBC: CPT | Performed by: STUDENT IN AN ORGANIZED HEALTH CARE EDUCATION/TRAINING PROGRAM

## 2024-12-21 PROCEDURE — 80177 DRUG SCRN QUAN LEVETIRACETAM: CPT | Performed by: EMERGENCY MEDICINE

## 2024-12-21 PROCEDURE — 96375 TX/PRO/DX INJ NEW DRUG ADDON: CPT

## 2024-12-21 PROCEDURE — 96374 THER/PROPH/DIAG INJ IV PUSH: CPT | Mod: 59

## 2024-12-21 PROCEDURE — 99285 EMERGENCY DEPT VISIT HI MDM: CPT | Mod: 25

## 2024-12-21 PROCEDURE — 255N000002 HC RX 255 OP 636: Performed by: STUDENT IN AN ORGANIZED HEALTH CARE EDUCATION/TRAINING PROGRAM

## 2024-12-21 PROCEDURE — 70553 MRI BRAIN STEM W/O & W/DYE: CPT

## 2024-12-21 PROCEDURE — 80048 BASIC METABOLIC PNL TOTAL CA: CPT | Performed by: STUDENT IN AN ORGANIZED HEALTH CARE EDUCATION/TRAINING PROGRAM

## 2024-12-21 PROCEDURE — A9585 GADOBUTROL INJECTION: HCPCS | Performed by: STUDENT IN AN ORGANIZED HEALTH CARE EDUCATION/TRAINING PROGRAM

## 2024-12-21 PROCEDURE — 93005 ELECTROCARDIOGRAM TRACING: CPT

## 2024-12-21 RX ORDER — LEVETIRACETAM 15 MG/ML
1500 INJECTION INTRAVASCULAR ONCE
Status: COMPLETED | OUTPATIENT
Start: 2024-12-21 | End: 2024-12-21

## 2024-12-21 RX ORDER — GADOBUTROL 604.72 MG/ML
10 INJECTION INTRAVENOUS ONCE
Status: COMPLETED | OUTPATIENT
Start: 2024-12-21 | End: 2024-12-21

## 2024-12-21 RX ORDER — LORAZEPAM 2 MG/ML
4 INJECTION INTRAMUSCULAR EVERY 5 MIN PRN
Status: DISCONTINUED | OUTPATIENT
Start: 2024-12-21 | End: 2024-12-21 | Stop reason: HOSPADM

## 2024-12-21 RX ORDER — LEVETIRACETAM 500 MG/1
500 TABLET ORAL 2 TIMES DAILY
Qty: 90 TABLET | Refills: 0 | Status: SHIPPED | OUTPATIENT
Start: 2024-12-21

## 2024-12-21 RX ORDER — LORAZEPAM 2 MG/ML
1 INJECTION INTRAMUSCULAR ONCE
Status: COMPLETED | OUTPATIENT
Start: 2024-12-21 | End: 2024-12-21

## 2024-12-21 RX ORDER — LIDOCAINE 40 MG/G
CREAM TOPICAL
Status: DISCONTINUED | OUTPATIENT
Start: 2024-12-21 | End: 2024-12-21 | Stop reason: HOSPADM

## 2024-12-21 RX ADMIN — LORAZEPAM 1 MG: 2 INJECTION INTRAMUSCULAR; INTRAVENOUS at 13:59

## 2024-12-21 RX ADMIN — GADOBUTROL 10 ML: 604.72 INJECTION INTRAVENOUS at 13:10

## 2024-12-21 RX ADMIN — LEVETIRACETAM 1500 MG: 15 INJECTION INTRAVENOUS at 13:59

## 2024-12-21 ASSESSMENT — ACTIVITIES OF DAILY LIVING (ADL)
ADLS_ACUITY_SCORE: 56

## 2024-12-21 ASSESSMENT — COLUMBIA-SUICIDE SEVERITY RATING SCALE - C-SSRS
1. IN THE PAST MONTH, HAVE YOU WISHED YOU WERE DEAD OR WISHED YOU COULD GO TO SLEEP AND NOT WAKE UP?: NO
6. HAVE YOU EVER DONE ANYTHING, STARTED TO DO ANYTHING, OR PREPARED TO DO ANYTHING TO END YOUR LIFE?: NO
2. HAVE YOU ACTUALLY HAD ANY THOUGHTS OF KILLING YOURSELF IN THE PAST MONTH?: NO

## 2024-12-21 NOTE — ED TRIAGE NOTES
"Pt here with grand-mal seizure last approx 30 seconds. No injuries from seizures, did not bit tongue or loss control of bladder. Hx brain cancer. Told tumor is bigger on Wed. Currently feels \"out of it\". No HA, nausea. A&Ox4. ABC intact.         "

## 2024-12-21 NOTE — ED NOTES
Lab stated pt's red top tube for Keppra level was hemolyzed. Will have lab redraw. Provider updated.

## 2024-12-21 NOTE — DISCHARGE INSTRUCTIONS
I sent prescription for 500 mg dose Keppra to the [facility].  Please add this to your twice daily regimen of Keppra to equate to a total of 1500 mg twice daily.  Follow-up with neurosurgery team regarding ER visit and further monitoring.    If worsening symptoms develop, return to ER.

## 2024-12-21 NOTE — ED PROVIDER NOTES
"ED APC SUPERVISION NOTE:   I evaluated this patient in conjunction with Vanessa REYES I have participated in the care of the patient and personally performed key elements of the history, exam, and medical decision making.      HPI:   Connor Emerson is a 44 year old male with a history of malignant brain neoplasm metastatic to both lungs who presents emergency department with concerns for difficulty controlling the function of his right arm, diffuse in nature.  This includes clenching and rigidity of the arm as well as difficulty with maneuvering the arm when he is trying to do something like close the door.  They same episodic in nature.  He notes his symptoms started with a headache earlier this week.  He was seen by neurology and they recommended assessment in the emergency department as he had associated vomiting and blurry vision.  Those symptoms resolved and then his right arm began to bother him.  He notes he did have an MRI recently which he told was told that the tumor masses were becoming larger.  He takes Keppra for seizure prophylaxis and has been taking this as prescribed.  He denies any pain or loss of sensation in the arm or any other extremity involvement.  Denies any speech changes.    Independent Historian:   None    Review of External Notes: MRI from 12/11/2024 reviewed.       EXAM:   Patient Vitals for the past 24 hrs:    BP Temp Temp src Pulse Resp SpO2 Height Weight   12/21/24 1411 (!) 156/110 -- -- 103 24 95 % -- --   12/21/24 1404 -- -- -- 105 16 95 % -- --   12/21/24 1304 -- -- -- 106 15 -- -- --   12/21/24 1249 -- -- -- 101 14 94 % -- --   12/21/24 1154 -- -- -- 90 11 96 % -- --   12/21/24 1139 -- -- -- 93 16 97 % -- --   12/21/24 1137 -- -- -- 95 14 97 % -- --   12/21/24 1118 (!) 205/138 99  F (37.2  C) Temporal 95 18 98 % 1.753 m (5' 9\") 100.8 kg (222 lb 3.6 oz)     (When I assessed the patient he notes that his symptoms have resolved)  General: Adult male sitting upright  Eyes: PERRL, " Conjunctive within normal limits  ENT: Moist mucous membranes, oropharynx clear.   CV: Normal S1S2, no murmur, rub or gallop. Regular rate and rhythm  Resp: Clear to auscultation bilaterally, no wheezes, rales or rhonchi. Normal respiratory effort.  GI: Abdomen is soft, nontender and nondistended. No palpable masses. No rebound or guarding.  MSK: No edema. Nontender. Normal active range of motion.  Skin: Warm and dry. No rashes or lesions or ecchymoses on visible skin.  Neuro: Alert and oriented. Responds appropriately to all questions and commands. No focal findings appreciated. Normal muscle tone.  Strength 5 out of 5  lateral upper extremities.  5 out of 5 strength throughout the right upper extremity.  No rigidity.  No clonus.  Sensation intact to light touch over all dermatomes of the right upper extremity.  Psych: Normal mood and affect. Pleasant.     Independent Interpretation (X-rays, CTs, rhythm strip):  None    Consultations/Discussion of Management or Tests:  None     MEDICAL DECISION MAKING/ASSESSMENT AND PLAN:   Connor Emerson is a 44-year-old male presents emergency department with concerns for difficulty controlling function of his right arm intermittently over the past few days in the setting of known brain tumor that is probably increasing in size.  His symptoms resolved while in the emergency department.  On my examination he had no focal neurologic deficits, this was after the PA examination noted in the same clinical evaluation.  Neurology was consulted and patient had MRI here to evaluate.  There are no acute changes compared to past.  Symptoms are likely thought to be secondary to a focal seizure.  The patient is noted to be hyperglycemic in the setting of type 2 diabetes mellitus.  Not take his insulin today and does have poor diabetic control as an outpatient.  He is not acidotic, no indication of DKA.  Electrolytes are reassuring otherwise.  Also noted to be hypertensive in the setting of  not taking his blood pressure medication earlier today.  It did improve and is not emergently concerning.,  Patient recommended take his home meds including insulin and blood pressure meds.  With regards to the focal seizure likely secondary to known brain mass, neurology recommended increasing the patient's Keppra dosing.  Recommended follow-up with his neurology and oncology team.  Recommended return to the emergency breath worsening.  He felt comfortable plan.  All questions answered prior to discharge.     DIAGNOSIS:     ICD-10-CM    1. Focal seizure (H)  R56.9       2. History of brain tumor  Z87.898       3. Elevated blood pressure reading  R03.0       4. Uncontrolled type 2 diabetes mellitus with hyperglycemia (H)  E11.65             Radha Pratt MD  12/21/2024  [facility] EMERGENCY DEPT      Radha Pratt MD  12/22/24 1500

## 2024-12-21 NOTE — ED PROVIDER NOTES
Emergency Department Note      History of Present Illness     Chief Complaint   Seizures      HPI   Connor Emerson is a 44 year old male with a history of DM2, hypertension, brain tumor, malignant neoplasm metastatic to both lungs, pleural effusion, brain necrosis, seizure, and pulmonary embolism who presents to the ED for seizures. The patient reports experiencing loss of control in his right hand, including all 5 digits, for the past couple of days. His hand stays clenched and is tremulous. He states that he had a seizure today where the loss of control began travelling up his entire right arm, resulting in him coming into the ED. He denies any loss of consciousness during this episode. Patient has a history of brain cancer and has had 2 surgeries for this, once in July of 2023 and another a year prior. He states that 3 days prior to his July 2023 surgery, he was having seizures where he could not control his arm. Further states that after his first surgery he was barely able to talk, and after his second surgery he was unable to write. He notes that he was told that his tumor got bigger 3 days ago. Patient states that he is taking his Keppra as prescribed, and has not had any recent dosage changes. He states that the last change was a coupe of years ago. Patient denies pain/numbness in his right arm, leg weakness, or speech changes, though his partner notes that he has brain fog and his speech is slightly slower than normal. Patient is right hand dominant.     Independent Historian   Partner as detailed above.    Review of External Notes   I reviewed 12/16/24 oncology visit.    Past Medical History     Medical History and Problem List   MDD  Migraine  SADIA  Hypertension  GERD  Morbid obesity  DM2  Adjustment disorder  Cellulitis of right hand  Hyperglycemia  Insomnia  Intractable chronic migraine  Lateral epicondylitis of right elbow  Malignant neoplasm metastatic to both lungs  Anemia  Pleural effusion  Brain  tumor  Radiation therapy induced brain necrosis  Seizure  Severe sepsis  Pulmonary embolism  Mediastinal lymphadenopathy  Pneumonia     Medications   Norvasc  Flexeril  Cymbalta  Jardiance  Prozac  Keppra  Zestril  Lorbrena  Glucophage  Ritalin  Roxicodone  Inderal  Xarelto  Crestor  Ozempic     Surgical History   Past Surgical History:   Procedure Laterality Date    BRONCHOSCOPY RIGID OR FLEXIBLE W/TRANSENDOSCOPIC ENDOBRONCHIAL ULTRASOUND GUIDED Bilateral 1/26/2022    Procedure: Right BRONCHOSCOPY, FIBEROPTIC, endobronchial ultrasound, pleural biopsy;  Surgeon: Dallin Agrawal MD;  Location: [facility] OR    INJECT BLOCK MEDIAL BRANCH CERVICAL/THORACIC/LUMBAR      INSERT CHEST TUBE Right 2/16/2022    Procedure: INSERTION, CATHETER, INTERCOSTAL, FOR DRAINAGE;  Surgeon: Dallin Agrawal MD;  Location: [facility] GI    INSERT CHEST TUBE Right 3/9/2022    Procedure: INSERTION, CATHETER, INTERCOSTAL, FOR DRAINAGE;  Surgeon: Sushila Antonio MD;  Location: [facility] GI    IR CHEST TUBE REMOVAL TUNNELED RIGHT  4/2/2022    OPTICAL TRACKING SYSTEM CRANIOTOMY, EXCISE TUMOR, COMBINED Left 6/28/2022    Procedure: Left stealth craniotomy for tumor resection with motor mapping;  Surgeon: Stephen Moran MD;  Location:  OR    OPTICAL TRACKING SYSTEM CRANIOTOMY, EXCISE TUMOR, COMBINED Right 6/27/2023    Procedure: Right stealth craniotomy and resection of tumor;  Surgeon: Stephen Moran MD;  Location:  OR    ORTHOPEDIC SURGERY      Ganesh. Rotator cuff repair.    PLEUROSCOPY N/A 1/26/2022    Procedure: Pleuroscopy with Pleural Biopsy;  Surgeon: Dallin Agrawal MD;  Location: [facility] OR       Physical Exam     Patient Vitals for the past 24 hrs:   BP Temp Temp src Pulse Resp SpO2 Height Weight   12/21/24 1411 (!) 156/110 -- -- 103 24 95 % -- --   12/21/24 1404 -- -- -- 105 16 95 % -- --   12/21/24 1304 -- -- -- 106 15 -- -- --   12/21/24 1249 -- -- -- 101 14 94 % -- --   12/21/24 1154 -- -- -- 90 11 96 % -- --   12/21/24 1139 -- -- -- 93 16 97 %  "-- --   12/21/24 1137 -- -- -- 95 14 97 % -- --   12/21/24 1118 (!) 205/138 99  F (37.2  C) Temporal 95 18 98 % 1.753 m (5' 9\") 100.8 kg (222 lb 3.6 oz)     Physical Exam  Constitutional: Alert, attentive  HENT:    Nose: Nose normal.    Mouth/Throat: Oropharynx is clear, mucous membranes are moist  Eyes: EOM are normal. Pupils are equal, round, and reactive to light.   CV: Regular rate and rhythm, no murmurs, rubs or gallops.  Chest: Effort normal and breath sounds normal.   GI: No distension. There is no tenderness  MSK: Normal range of motion.   Neurological:   GCS 15; A/Ox3; Cranial nerves 2-12 intact;   speech is clear with no aphasia  Finger to nose within normal limits on left, unable to assess right.  No pronator drift.    Strength is 5/5 in all 4 extremities.  Sensation is intact.    Right upper extremity with contraction to elbow and hand. Can release fist to some degree slowly  Skin: Skin is warm and dry.      Diagnostics     Lab Results   Labs Ordered and Resulted from Time of ED Arrival to Time of ED Departure   BASIC METABOLIC PANEL - Abnormal       Result Value    Sodium 139      Potassium 4.1      Chloride 101      Carbon Dioxide (CO2) 24      Anion Gap 14      Urea Nitrogen 11.5      Creatinine 0.60 (*)     GFR Estimate >90      Calcium 9.3      Glucose 549 (*)    CBC WITH PLATELETS AND DIFFERENTIAL    WBC Count 9.3      RBC Count 5.22      Hemoglobin 15.8      Hematocrit 45.6      MCV 87      MCH 30.3      MCHC 34.6      RDW 12.5      Platelet Count 179      % Neutrophils 59      % Lymphocytes 28      % Monocytes 6      % Eosinophils 6      % Basophils 1      % Immature Granulocytes 0      NRBCs per 100 WBC 0      Absolute Neutrophils 5.5      Absolute Lymphocytes 2.6      Absolute Monocytes 0.6      Absolute Eosinophils 0.6      Absolute Basophils 0.1      Absolute Immature Granulocytes 0.0      Absolute NRBCs 0.0     GLUCOSE MONITOR NURSING POCT   KEPPRA (LEVETIRACETAM) LEVEL       Imaging   MR " Brain w/o & w Contrast   Final Result   IMPRESSION:   1.  The dural thickening and enhancement deep to the craniotomy in the deeper patchy enhancement associated with the resection cavity in the orbital right frontal lobe is able to slightly decreased compared to 12/11/2024 but mild to moderately decreased    compared to 08/28/2024.   2.  The bilobed peripheral enhancement involving the left cerebellar hemisphere appear less cystic and slightly more nodular and thicker compared to 08/28/2024. The surrounding FLAIR hyperintensity is slightly increased compared to 12/11/2024 but    moderately increased compared to 08/28/2024.   3.  The dural thickening and enhancement as well as the deeper patchy enhancement surrounding the resection cavity in the posterior left frontal lobe measuring approximately 1.1 x 1.7 cm is not significantly changed.   4.  No significant change in the approximately 1.1 x 1.4 cm enhancement in the inferior right temporal lobe. The surrounding FLAIR hyperintensity is also not significantly changed.   5.  The other known enhancing intracranial lesions are stable.   6.  No new enhancing intracranial lesions. No superimposed acute intracranial pathology.          EKG   ECG taken at 1145, ECG read at 1155  Normal sinus rhythm  Normal ECG   Rate 89 bpm. AZ interval 140 ms. QRS duration 76 ms. QT/QTc 368/447 ms. P-R-T axes 39 43 58.    Independent Interpretation   None    ED Course      Medications Administered   Medications   lidocaine 1 % 0.1-1 mL (has no administration in time range)   lidocaine (LMX4) cream (has no administration in time range)   sodium chloride (PF) 0.9% PF flush 3 mL (has no administration in time range)   sodium chloride (PF) 0.9% PF flush 3 mL (has no administration in time range)   LORazepam (ATIVAN) injection 4 mg (has no administration in time range)   levETIRAcetam (KEPPRA) intermittent infusion 1,500 mg (0 mg Intravenous Stopped 12/21/24 1414)   gadobutrol (GADAVIST)  injection 10 mL (10 mLs Intravenous $Given 12/21/24 1310)   LORazepam (ATIVAN) injection 1 mg (1 mg Intravenous $Given 12/21/24 1359)       Procedures   Procedures     Discussion of Management   Neurology, Dr. Zahida Alvarado    ED Course   ED Course as of 12/21/24 1501   Sat Dec 21, 2024   1134 I obtained history and examined the patient as noted above.     1224 I spoke with Dr. Zahida Alvarado of [facility] regarding patient's presentation and plan of care.   1228 I rechecked the patient.   1454 I spoke with Dr. Zahida Alvarado of [facility] regarding patient's imagine results.   1455 I rechecked the patient and discussed imaging results.       Additional Documentation  Social Determinants of Health: None    Medical Decision Making / Diagnosis     CMS Diagnoses: None    MIPS       None    MDM   Connor Emerson is a 44 year old male who presents with concern for focal seizure in the setting of known brain tumor.  Patient was seen for 2 weeks ago where he had MRI completed that showed slight increased size 2 lesions to right temporal lobe and left cerebral hemisphere.  Also appears to have slightly increased vasogenic edema in the right temporal lobe and left cerebellar hemisphere.  Upon arrival to ER, was alert and oriented x 3, speaking clearly with no aphasia.  Neurologic exam completed at the bedside normal outside of right arm contracture left hand clenched and elbow held in flexion.  He is able to unclenched the hand very slowly and move the arm somewhat.  He has normal sensation throughout and can squeeze with equal strength bilaterally.  Consulted with Dr. Alvarado with neurology who recommends dose of lorazepam as well as increasing Keppra with loading dose provided here along with MRI to evaluate for the progression with brain tumors is contributory.  MRI results completed and does not show any acute changes in comparison to 12/11/2024.  Labs remarkable for significantly elevated glucose  at 549.  Patient reports that he did not take his insulin today however it appears he has been having some difficulty managing diabetic control as outpatient.  No evidence to suggest DKA as gap is normal, not acidotic.  Electrolytes are stable.  Blood pressure noted to be elevated upon arrival as well however patient reports he did not take blood pressure medication prior to arrival.  This did improve slightly without intervention here.  Was reflux more with Dr. Alvarado with neurology reports that patient is safe for discharge home but does encourage close follow-up with neurosurgeon team regarding symptoms present today.  Will increase patient's outpatient Keppra dose to 1500 mg twice daily.  Rx sent to pharmacy.  On reassessment after diazepam, patient has full range of motion of right upper extremity, no longer having contractures.  Discussed recommendations and patient is in agreement with this plan.  If he develops any worsening symptoms, instructed him to return to ER.  Patient is safe for discharge home with his wife at this time.  Disposition   The patient was discharged.     Diagnosis     ICD-10-CM    1. Focal seizure (H)  R56.9       2. History of brain tumor  Z87.898       3. Elevated blood pressure reading  R03.0       4. Uncontrolled type 2 diabetes mellitus with hyperglycemia (H)  E11.65            Discharge Medications   New Prescriptions    LEVETIRACETAM (KEPPRA) 500 MG TABLET    Take 1 tablet (500 mg) by mouth 2 times daily. Add this to your prescribed 1000 mg dose twice daily to equate to 1500 mg twice daily.         Scribe Disclosure:  Cammy MUNGUIA, am serving as a scribe at 11:30 AM on 12/21/2024 to document services personally performed by Vanessa Guzman PA-C based on my observations and the provider's statements to me.        Vanessa Guzman PA-C  12/21/24 [id number]

## 2024-12-22 ENCOUNTER — TELEPHONE (OUTPATIENT)
Dept: NURSING | Facility: CLINIC | Age: 44
End: 2024-12-22
Payer: COMMERCIAL

## 2024-12-22 NOTE — TELEPHONE ENCOUNTER
[facility]    Reason for call: Lab Result Notification     Lab Result (including Rx patient on, if applicable).  If culture, copy of lab report at bottom.  Lab Result:   Component      Latest Ref Rng 12/21/2024  2:11 PM   Keppra (Levetiracetam) Level      10.0 - 40.0  g/mL 56.0 (H)       Legend:  (H) High    ED Rx: levETIRAcetam (KEPPRA) 500 MG tablet - Take 1 tablet (500 mg) by mouth 2 times daily. Add this to your prescribed 1000 mg dose twice daily to equate to 1500 mg twice daily     Creatinine Level (mg/dl)   Creatinine   Date Value Ref Range Status   12/21/2024 0.60 (L) 0.67 - 1.17 mg/dL Final   05/12/2020 0.66 0.66 - 1.25 mg/dL Final    Creatinine clearance (ml/min), if applicable    Serum creatinine: 0.6 mg/dL (L) 12/21/24 1128  Estimated creatinine clearance: 179.9 mL/min (A)     ED Symptoms: Patient presented to [facility] ED on 12/21/2024 for evaluation of seizures. Hx of brain cancer.    RN Recommendations/Instructions per [facility] [employer] [facility] lab result protocol:   [facility] ED lab result protocol utilized: Misc protocol - seizure med levels  Follow up with provider tomorrow    Patient's current Symptoms:   Spoke to patient and reviewed keppra level  results with them. Advised patient continue follow up with provider tomorrow. Patient requested that results be faxed to Dr. Brewer at [facility]. Return precautions reviewed. Patient verbalized understanding and agreement.      Patient/care giver notified to contact your PCP clinic or return to the Emergency department if your:  Symptoms return.  Symptoms worsen or other concerning symptoms.       Ade Brice RN

## 2024-12-23 ENCOUNTER — MYC REFILL (OUTPATIENT)
Dept: PALLIATIVE CARE | Facility: CLINIC | Age: 44
End: 2024-12-23
Payer: COMMERCIAL

## 2024-12-23 DIAGNOSIS — D49.6 BRAIN TUMOR (H): ICD-10-CM

## 2024-12-23 DIAGNOSIS — Z98.890 S/P CRANIOTOMY: ICD-10-CM

## 2024-12-23 LAB
ATRIAL RATE - MUSE: 89 BPM
DIASTOLIC BLOOD PRESSURE - MUSE: NORMAL MMHG
INTERPRETATION ECG - MUSE: NORMAL
P AXIS - MUSE: 39 DEGREES
PR INTERVAL - MUSE: 140 MS
QRS DURATION - MUSE: 76 MS
QT - MUSE: 368 MS
QTC - MUSE: 447 MS
R AXIS - MUSE: 43 DEGREES
SYSTOLIC BLOOD PRESSURE - MUSE: NORMAL MMHG
T AXIS - MUSE: 58 DEGREES
VENTRICULAR RATE- MUSE: 89 BPM

## 2024-12-24 RX ORDER — OXYCODONE HYDROCHLORIDE 10 MG/1
10 TABLET ORAL
Qty: 120 TABLET | Refills: 0 | Status: SHIPPED | OUTPATIENT
Start: 2024-12-27

## 2024-12-24 NOTE — TELEPHONE ENCOUNTER
Received [employer] message from patient requesting refill of oxycodone.     Last refill: 12/13/2024  Last office visit: 10/21/2025  Scheduled for follow up 1/2/2025    Will route request to MD/ for review.     Reviewed [place]  Report.

## 2024-12-30 ENCOUNTER — VIRTUAL VISIT (OUTPATIENT)
Dept: ONCOLOGY | Facility: CLINIC | Age: 44
End: 2024-12-30
Attending: PHYSICIAN ASSISTANT
Payer: COMMERCIAL

## 2024-12-30 VITALS — BODY MASS INDEX: 32.14 KG/M2 | WEIGHT: 217 LBS | HEIGHT: 69 IN

## 2024-12-30 ASSESSMENT — PAIN SCALES - GENERAL: PAINLEVEL_OUTOF10: NO PAIN (0)

## 2024-12-30 NOTE — PROGRESS NOTES
"Virtual Visit Details    Type of service:  Video Visit   Video Start Time: {video visit start/end time for provider to select:172276}  Video End Time:{video visit start/end time for provider to select:499026}    Originating Location (pt. Location): {video visit patient location:256370::\"Home\"}  {PROVIDER LOCATION On-site should be selected for visits conducted from your clinic location or adjoining [facility] hospital, academic office, or other nearby [facility] building. Off-site should be selected for all other provider locations, including home:[id number]}  Distant Location (provider location):  {virtual location provider:870163}  Platform used for Video Visit: {Virtual Visit Platforms:256789::\"OFERTALDIA\"}          MEDICAL ONCOLOGY FOLLOW UP NOTE    PATIENT NAME: Connor Emerson  ENCOUNTER DATE: Dec 30, 2024        Care Team  Primary Oncologist: Bassam Persaud MD    REASON FOR CURRENT VISIT: F/u of lung cancer    HISTORY OF PRESENT ILLNESS:  Mr. Connor Emerson is a 44 year old  male who is a non-smoker with PMHx of T2DM, HTN with metastatic NSCLC comes for follow up     Oncologic Hx:    Diagnosis:   Stage IV NSCLC, Rt lung adenocarcinoma with metastasis to pleura, mediastinum , rt pleural effusion and brain diagnosed 1/2022 (AJCC 8th edition)  PD-L1 TPS 2-3% by [place]   NGS [facility] panel-EML4:ALK rearragement  NGS [facility]- GNAS R201H, KRAS K5E- No ALK    Treatment:   7/10/24-current: Lolatinib 100 mg daily    2/23/2022- 6/2024: Brigatinib. Dose reduced to 60 mg due to cough after two days of 90 mg daily -->back on 90 mg---> increased to 180 mg  ---> settled on 120 mg    6/27/23- Right stealth craniotomy and resection of tumor:     7/20/23- 11/14/23: Bevacizumab for radiation necrosis. Discontinued due to severe HTN.    Past:  2/15/22- GK to 11 brain lesions  3/2/22- 12/2022 Alectinib 300 mg BID (Dose reduced to 450 mg BID from 600 mg BID due to grade 3 myalgias 3/21/22, again reduced to 300 mg BID 9/28/22)  6/28/22- Craniotomy, " resection  9/28/22-10/26- Bevacizumab for radiation necrosis (stopped due to PE)      Intent of treatment: Palliative    Oncologic course:  1/19/22 to 1/22/22-Admitted to [facility] for 2 week progressive SOB secondary to have large rt sided pleural effusion, needing thoracentesis x2 (1.7L and 2.0 L removed), cytology positive for malignancy, adenocarcinoma.   1/26/22- Rt pleural mass biopsy-Dr. Agrawal--POSITIVE FOR ADENOCARCINOMA CONSISTENT WITH LUNG PRIMARY, admixed with mesothelial hyperplasia and inflammatory infiltrate (+ TTF-1 and CK 7;  negative  p40, calretinin and WT-1. PAX8 immunostain focal +). 4th thoracentesis done simultaneously - 3L approx removed.   2/1/22- PET/CT-Right lower lobe central infiltrative FDG avid 8.2 x 9.6 cm mass representing a primary lung adenocarcinoma. Ipsilateral right perihilar, bilateral pretracheal, subcarinal and superior mediastinal michele metastases. Contralateral mildly FDG avid few lung nodules are suspicious for contralateral metastasis. At least 3 intracranial metastases in the right frontal lobe, left frontal lobe and left cerebellar hemisphere. Nonspecific mild diffuse bone marrow uptake. Further evaluation with a spine MRI could be considered to rule out early marrow infiltration. This could also be seen with red marrow conversion.  2/5/22-  Brain MRI- At least 9 intracranial metastases as detailed above. The dominant lesions involving the orbital right frontal lobe, the posterior left middle frontal gyrus, anterior right temporal lobe and in the left cerebellar hemisphere have surrounding moderate vasogenic type edema.  2/15/22- Saw Dr. Arango from Rad Onc- Rcd GK to 12 lesion in bran  2/16/22- Pleurex placement   3/2/22- Started Alectinib 600 mg BID  3/21/22- Dose reduced to 450 mg BID due to grade 3 myalgias and fatigue  4/2/22 to 4/5/22- Admitted at [facility] for- Severe sepsis due to MSSA infection of right PleurX catheter s/p removal- He presented with onset of pain at  tube site starting 4/1; at arrival was tachycardic with leukocytosis (22.7) and elevated lactic acid (2.9).  CT chest showed fluid and stranding tracking outside the pleural space into chest wall along pleural catheter.  IR was consulted and removed catheter 4/2 with report of pustular drainage and tip culture growing MSSA.  Thoracic Surg was consulted who felt no surgical indication necessary given minimal pleural fluid and lack of any signs of abscess.  Initially treated with broad spectrum coverage for sepsis, narrowed to Ancef once sensitivities returned with plan to transition to cefadroxil for an additional 10 days at discharge per ID. Held drug 4/2 to 4/11 5/2/22- CT CAP- Overall, positive response to therapy with decreased size of right lower lobe and right pleural-based masses, pulmonary metastases, hilar and mediastinal lymphadenopathy. However, a single right posterior pleural-based mass has slightly increased in size since 2/24/2022. No metastatic disease in the abdomen and pelvis. Right Pleurx catheter has been removed. Trace right pleural effusion and right basilar atelectasis.  5/2/22- Brain MRI- The previously demonstrated brain metastases are mildly diminished in size versus to 2/5/2022. The degree of edema is also diminished but not completely resolved. Probable trace amounts of intralesional bleeding demonstrated on the gradient sequence within the metastases. No definite new metastasis or progressive mass effect. No hydrocephalus or infarct.    6/15/22 to 6/17/22- Admitted at [facility]-with aphasia and word finding difficulty over last few weeks.  He presented to [facility] ED on 6/10 for evaluation of his symptoms. MRI brain showed multiple intracranial metastases, with interval enlargement of the dominant lesion within the left frontal lobe and increased surrounding vasogenic edema with 2 mm rightward shift of the septum pellucidum. Due to his worsening anxiety, he left AMA. His symptoms continued to  progress to where he could not write at work so he decided to go to the ED for re-evaluation and treatment. Evaluated by NSGY, Rad Onc (radiaiton necrosis vs tumor progression).  6/16/22- MR Brain (6/16) shows multiple intracranial metastases, with interval enlargement  of the dominant lesion within the left frontal lobe and increased surrounding vasogenic edema with 2 mm rightward shift of the septum pellucidum.  6/16/22- - CT CAP shows slightly decreased size of right lower lobe and right pleural-based masses. No new pulmonary nodules or lymphadenopathy; No evidence of metastatic disease in the abdomen or pelvis.   6/28/22 to 6/30/22- Admitted at [facility]- Elective left Stealth craniotomy with resection of brain tumor due to ongoing symptoms. No intraoperative complications. EBL 50 ml.  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  7/5/22- Ct CAP- Right lower lobe low-density nodules are not significantly changed. A small left upper lobe pulmonary nodule is also unchanged. Trace pleural fluid on the right has increased slightly. No convincing evidence for metastatic disease in the abdomen or pelvis.  7/18/22 to 7/19/22- Admitted to [facility] for seizure- Reportedly was only taking once Keppra instead of twice daily. Also resumed on dexamethasone 2 mg daily  8/1/22- Brain MRI- Redemonstrated postsurgical changes status post left frontoparietal Craniotomy. Interval increase in size of the dominant ring-enhancing lesion in the left posterior superior frontal lobe with increased moderate surrounding vasogenic edema and local mass effect resulting in narrowing of the supratentorial ventricular system. No significant midline shift/herniation at this time    8/1/22- Dex was increased to 4 mg daily by Dr. Moran    9/1/22- CT Chest- Near resolution of previously seen right pleural nodule. Stable right lower lobe pulmonary nodule    9/28/22- Bevacizumab for radiation necrosis    10/26/22- Bevacizumab     10/26/22- Ct CAP-  Stable posterior medial right lower lobe 1.9 x 1.1 cm nodule series 8 image 176. Adjacent stable scarring and atelectasis. The previously noted pleural nodule posteriorly on the right is not currently clearly identified. Stable left upper lobe 0.3 cm nodule image 56    10/26/22- Brain MRI- Overall improved appearance of multiple intracranial metastases with near resolution of associated edema and diminished enhancement and size of multiple residual lesions. No definite new or progressive metastasis.    11/7/22 to 11/9/22- Admitted for PE and HTN urgency- Small pulmonary embolism in the right lower lobe pulmonary artery. started on Lovenox, Brain MRI neg for PRES.    12/29/22- ED visit- bilateral hip pain, pain in shoulders, knuckles, knees, and ankles- holding alectinib since 12/20/22 1/6/23- Ct CAP- Mild groundglass nodularity in the left upper lobe is new since the previous exam, and may be infectious in etiology. No other significant interval change. Pulmonary nodules are not significantly changed.    1/6/23- Brain MRI- Stable to diminished sequelae of intracranial metastasis and treatment changes. No new or progressive metastasis. No superimposed acute intracranial finding.     2/23/2022- Start Brigatinib. Dose reduced to 60 mg due to cough after two days of 90 mg daily -  3/23/23-Brigatinib  (increase to 90 mg)    4/20/23- CT CAP- Stable- Previously noted mild groundglass nodularity in the left upper lobe has resolved.Pulmonary nodules are unchanged.Trace amount pleural fluid on the right has decreased slightly.    4/20/23- Brain MRI- Possile progression- Largest metastasis within the right frontal lobe has increased in size with worsening peripheral nodular enhancement and worsening vasogenic edema contributing to new right to left midline shift.  Multiple new/enlarging metastases scattered throughout the cerebral hemispheres and cerebellum. Started on dexamethasone by NGS on 4/28/23 5/17/23- presents to  clinic with glucose 627, admission to hospital for stability on insulin drip    6/16/23- Brain MRI- Interval increase in size of the necrotic lesion in the anterior aspect of the right frontal lobe from 2.4 x 2.3 x 2.4 cm previously to 3.0 x 3.5 x 2.8 cm on the current study. Mass effect due to slightly increased vasogenic edema surrounding the right frontal lesion resulted in increase of leftward midline shift of the anterior interhemispheric fissure from 0.7 cm previously to 0.9 cm currently. Interval increase in size in two adjacent metastases at the frontoparietal junction on the left. The remaining scattered intra-axial enhancing nodules are not changed appreciably in size or appearance since the comparison study.No evidence for acute intracranial pathology.   Dr. Moran- Due to worsening headaches and more problems with walking. Recommended a right Stealth craniotomy for resection of the lesion.     6/27/23- Right stealth craniotomy and resection of tumor: Final path: radiation necrosis    7/10/23- Ct CAP- stable Stable exam without evidence for new disease.Stable pulmonary nodules including a dominant nodular opacity at the right lower lobe.    7/20/23- Bevacizumab for radiation necrosis    8/16/23- Bevacizumab     9/12/23- Ct CAP-  Stable right lower lobe dominant nodular opacity. No new disease in the chest, abdomen and pelvis.     9/15, 10/6, 10/27, 11/17-  Bevacizumab    10/25/23- MRI Brain- 1. Redemonstrated postoperative changes of right frontal craniotomy. Decreased size of the right frontal resection cavity with significant decrease in the surrounding right frontal lobe vasogenic edema seen on the previous study. Mass effect has essentially resolved in the interim and there is no longer any midline shift. In the interim, slightly increased thickness of a rind of peripheral nodular enhancement along the posterior inferior and medial aspects of the right frontal lobe resection cavity, indeterminate. There  is no significant elevated cerebral blood volume in this area. The findings may represent evolving posttreatment changes; however, residual tumor is not excluded.  Stable postoperative changes status post left anterior parietal craniotomy with irregular enhancement in the left frontal lobe parenchyma underlying the resection cavity, likely due to posttreatment change. Other scattered supratentorial and infratentorial metastatic lesions are overall similar to slightly decreased in size, as described.    12/5/23- PET/CT- with sole site of disease in the RLL measuring 1.6 x 1.4 cm and with SUV max of 4.2. No other sites of disease. His case was reviewed at tumor board and he met with Dr. Fu 12/14/24 with recommendations against surgical resection due to risk of significant pleural scarring. Continued on brigatinib 120 mg daily.     1/29/24 Brain MRI: No new metastatic lesions. No significant change in supratentorial and infratentorial subcentimeter metastatic enhancing lesions. Stable right inferior frontal and left high frontal postsurgical changes.     12/22-current: lost to follow up due to insurance issues    3/15/24- CT chest- Stable nodule medial right lower lobe. No metastatic disease demonstrated.    6/21/24- CT CAP- stable    6/26/24- Brain MRI- Numerous new and increased size of intracranial metastatic lesions. Increased nodular enhancement about the medial aspect of the right frontal lobe resection margin with increased adjacent T2/FLAIR hyperintense signal although without elevated cerebral blood volume could represent posttreatment changes although disease progression could have a similar appearance.  Stable left frontal lobe resection changes with stable underlying curvilinear enhancement.    7/10/24: start lorlatinib 100 mg daily    7/23/24-7/25/24: [facility] with syncopal episodes, suspected to be vasovagal due to low pressures    12/11/24- Ct CAP-   Stable right lower lobe nodular opacity, likely  related to posttreatment changes. No new disease in the chest, abdomen and pelvis.    12/11/24- MRI Brain- Redemonstrated postsurgical changes status post right frontoparietal craniotomy. The underlying irregular enhancing lesion in the anterolateral right frontal lobe appears mildly decreased in size (series 14 image 100). Redemonstrated postsurgical changes status post left parietal vertex craniotomy. The underlying irregular enhancing lesion in the posterior superior left frontal lobe appears unchanged (series 14 image 135). Couple of lesions, including lesions in the right temporal lobe  (series 14 image 60) and in the left cerebellar hemisphere (series 14 image 49) may be slightly increased in size. There also appears to be similar if not slightly increased vasogenic edema in the right temporal lobe and left cerebellar hemisphere. Differential considerations would include mild interval progression of disease versus evolving treatment-related effects. Recommend continued close interval follow-up. Multiple additional scattered supratentorial and infratentorial enhancing intra-axial lesions concerning for metastatic disease appear otherwise similar in size.      Interval Hx:  Seeing Connor in clinic for follow up--  Was in the ED last week for seizures- had a tight contraction of the arm.  Was told to increase the keppra but then his levels came back looking high thus he was called to return to normal dosing   No further fainting  Has noticed a very bad headache sine this AM, has taken all of his anti-HTN meds for today  Has also noticed white falshes today  He threw up twice this AM likley from the headache  Headache is mostly in the left frontal/cranial area  Had similar headache 1 week ago but not as bad as this  He is not measuring his BP at home and also not very regularly measuring his Blood sugars  No syncopal episodes  No chest pain, cough, SOB  Taking the 75 mg lorlatinib, tolerating this dose well  Has  notcied muscle aches have decreased, still using the pain meds, Using methadone daily, still using oxycodone prn (uses about 6/day)  Still able to work   Eating aand drinking ok  Weight is slightly up  Continues to have chronic pain in the rt pleuritic area, not worse      ECOG PS 1    REVIEW OF SYSTEMS: 14 point ROS negative other than the symptoms noted above in the HPI.    Wt Readings from Last 4 Encounters:   12/30/24 98.4 kg (217 lb)   12/21/24 100.8 kg (222 lb 3.6 oz)   12/16/24 100 kg (220 lb 6.4 oz)   12/04/24 98.2 kg (216 lb 9.6 oz)      Review of Systems:  A comprehensive ROS was performed and found to be negative or non-contributory with the exception of that noted in the HPI above.    Past Medical History:  GERD  Hypertension, not on medication  Type 2 diabetes mellitus, not on medications currently, previously on Metformin    Past Surgical History:  Past Surgical History:   Procedure Laterality Date    BRONCHOSCOPY RIGID OR FLEXIBLE W/TRANSENDOSCOPIC ENDOBRONCHIAL ULTRASOUND GUIDED Bilateral 1/26/2022    Procedure: Right BRONCHOSCOPY, FIBEROPTIC, endobronchial ultrasound, pleural biopsy;  Surgeon: Dallin Agrawal MD;  Location: [facility] OR    INJECT BLOCK MEDIAL BRANCH CERVICAL/THORACIC/LUMBAR      INSERT CHEST TUBE Right 2/16/2022    Procedure: INSERTION, CATHETER, INTERCOSTAL, FOR DRAINAGE;  Surgeon: Dallin Agrawal MD;  Location: [facility] GI    INSERT CHEST TUBE Right 3/9/2022    Procedure: INSERTION, CATHETER, INTERCOSTAL, FOR DRAINAGE;  Surgeon: Sushila Antonio MD;  Location: [facility] GI    IR CHEST TUBE REMOVAL TUNNELED RIGHT  4/2/2022    OPTICAL TRACKING SYSTEM CRANIOTOMY, EXCISE TUMOR, COMBINED Left 6/28/2022    Procedure: Left stealth craniotomy for tumor resection with motor mapping;  Surgeon: Stephen Moran MD;  Location:  OR    OPTICAL TRACKING SYSTEM CRANIOTOMY, EXCISE TUMOR, COMBINED Right 6/27/2023    Procedure: Right stealth craniotomy and resection of tumor;  Surgeon: Stephen Moran MD;   Location:  OR    ORTHOPEDIC SURGERY      Ganesh. Rotator cuff repair.    PLEUROSCOPY N/A 2022    Procedure: Pleuroscopy with Pleural Biopsy;  Surgeon: Dallin Agrawal MD;  Location:  OR       Social History:  Lives with wife and 4 kids in [place]. Works as a  for an apartment complex in [place]. Exposure to household chemicals and . No significant exposure to asbestos. No signal exposure to benzene or similar chemicals. No significant smoking history-states that he smoked 1 to 2 cigarettes occasionally per month for about 2 years in college, non-smoking since then. No significant alcohol use history. No other recreational substances. Good support system. Kids are 23, 19, 17 and 13.    Family History  Significant history for cancers on maternal side. Mother  of uterine cancer. 2 maternal uncles have possible metastatic melanoma.    Outpatient Medications:  Current Outpatient Medications   Medication Sig Dispense Refill    acetaminophen (TYLENOL) 325 MG tablet Take 325-650 mg by mouth every 6 hours as needed for mild pain      albuterol (PROAIR HFA/PROVENTIL HFA/VENTOLIN HFA) 108 (90 Base) MCG/ACT inhaler Inhale 2 puffs into the lungs every 6 hours as needed for shortness of breath, wheezing or cough (Patient not taking: Reported on 10/16/2024) 18 g 0    Alcohol Swabs PADS Use to swab the area of the injection or jabier as directed. Per insurance coverage 100 each 0    amLODIPine (NORVASC) 10 MG tablet Take 1 tablet (10 mg) by mouth daily. 90 tablet 0    blood glucose (NO BRAND SPECIFIED) lancets standard To use to test glucose level in the blood Use to test blood sugar  4  times daily as directed. To accompany glucose monitor brands per insurance coverage. 100 each 3    blood glucose (NO BRAND SPECIFIED) test strip To use to test glucose level in the blood Use to test blood sugar  4 times daily as directed. To accompany glucose monitor brands per insurance coverage. 100  strip 3    Blood Glucose Monitoring Suppl (ACCU-CHEK GUIDE) w/Device KIT       Continuous Glucose  (FREESTYLE IRENE 3 READER) YVES       Continuous Glucose Sensor (FREESTYLE IRENE 3 SENSOR) MISC 1 each every 14 days. Use 1 sensor every 14 days. Use to read blood sugars per 's instructions. 6 each 3    cyclobenzaprine (FLEXERIL) 5 MG tablet Take 2 tablets (10 mg) by mouth nightly as needed for muscle spasms 30 tablet 4    DULoxetine (CYMBALTA) 30 MG capsule Take 1 capsule (30 mg) by mouth 2 times daily. 60 capsule 2    empagliflozin (JARDIANCE) 25 MG TABS tablet Take 1 tablet (25 mg) by mouth daily 90 tablet 1    FLUoxetine (PROZAC) 20 MG capsule Take 1 capsule (20 mg) by mouth daily. 30 capsule 1    insulin aspart (NOVOLOG PEN) 100 UNIT/ML pen Inject 0-40 Units Subcutaneous 3 times daily (before meals) Per discharge instructions sliding scale (Patient not taking: Reported on 8/5/2024) 45 mL 2    insulin glargine (LANTUS PEN) 100 UNIT/ML pen Inject 80 Units subcutaneously every morning. 30 mL 1    insulin pen needle (32G X 4 MM) 32G X 4 MM miscellaneous Use as directed by provider. Per insurance coverage 100 each 0    levETIRAcetam (KEPPRA) 1000 MG tablet Take 1 tablet (1,000 mg) by mouth 2 times daily 60 tablet 11    levETIRAcetam (KEPPRA) 500 MG tablet Take 1 tablet (500 mg) by mouth 2 times daily. Add this to your prescribed 1000 mg dose twice daily to equate to 1500 mg twice daily. 90 tablet 0    lisinopril (ZESTRIL) 40 MG tablet Take 1 tablet (40 mg) by mouth daily 30 tablet 1    [START ON 1/11/2025] lorlatinib (LORBRENA) 25 MG Take 3 tablets (75 mg) by mouth daily 90 tablet 0    lorlatinib (LORBRENA) 25 MG Take 3 tablets (75 mg) by mouth daily 90 tablet 0    metFORMIN (GLUCOPHAGE) 500 MG tablet Take 1 tablet (500 mg) by mouth 2 times daily (with meals) 60 tablet 3    methadone (DOLOPHINE) 10 MG tablet       methadone (DOLOPHINE) 5 MG tablet Take 1 tablet (5 mg) by mouth 2 times daily.  "(Patient not taking: Reported on 12/16/2024) 60 tablet 0    methylphenidate (RITALIN) 5 MG tablet Take 1-2 tablets (5-10 mg) by mouth 2 times daily as needed (fatigue). Take second dose by 12 noon, if it is needed 90 tablet 0    naloxone (NARCAN) 4 MG/0.1ML nasal spray Spray 1 spray (4 mg) into one nostril alternating nostrils as needed for opioid reversal every 2-3 minutes until assistance arrives (Patient not taking: Reported on 9/4/2024) 0.2 mL 3    oxyCODONE IR (ROXICODONE) 10 MG tablet Take 1 tablet (10 mg) by mouth every 3 hours as needed for moderate pain. 120 tablet 0    prochlorperazine (COMPAZINE) 10 MG tablet Take 1 tablet (10 mg) by mouth every 6 hours as needed for nausea or vomiting (Patient not taking: Reported on 8/5/2024) 30 tablet 2    propranolol (INDERAL) 20 MG tablet Take 1 tablet (20 mg) by mouth 2 times daily 60 tablet 1    rivaroxaban ANTICOAGULANT (XARELTO) 20 MG TABS tablet Take 1 tablet (20 mg) by mouth daily (with dinner) 90 tablet 3    rosuvastatin (CRESTOR) 10 MG tablet Take 1 tablet (10 mg) by mouth daily. 90 tablet 2    semaglutide (OZEMPIC) 2 MG/3ML pen Inject 0.5 mg subcutaneously every 7 days. For 4 weeks then increase to 0.5 mg once weekly if tolerating 3 mL 0     No current facility-administered medications for this visit.     Ht 1.753 m (5' 9\")   Wt 98.4 kg (217 lb)   BMI 32.05 kg/m      General: Patient appears well in no acute distress.   Skin: No visualized rash or lesions on visualized skin  Eyes: EOMI, no erythema, sclera icterus or discharge noted  Resp: Breathing comfortably. No tenderness of R lower posterior and anterior chest  Skin: No erythema, ecchymosis, or edema over R trunk  Neurologic: No apparent tremors, facial movements symmetric  Psych: affect bright, alert and oriented        Labs & Studies: I personally reviewed the following studies:  Most Recent 3 CBC's:  Recent Labs   Lab Test 12/21/24  1128 12/16/24  1430 11/06/24  1510   WBC 9.3 11.5* 7.6   HGB 15.8 " 16.0 15.1   MCV 87 89 90    199 171     Most Recent 3 BMP's:  Recent Labs   Lab Test 12/21/24  1128 12/16/24  1430 11/06/24  1510    139 141   POTASSIUM 4.1 4.7 3.8   CHLORIDE 101 97* 103   CO2 24 27 25   BUN 11.5 16.5 17.0   CR 0.60* 0.64* 0.67   ANIONGAP 14 15 13   TORY 9.3 10.1 9.4   * 545* 451*    Most Recent 2 LFT's:  Recent Labs   Lab Test 12/16/24  1430 11/06/24  1510   AST 13 13   ALT 18 19   ALKPHOS 135 118   BILITOTAL 0.3 0.2    Most Recent TSH and T4:  Recent Labs   Lab Test 09/12/22  1642   TSH 2.56        ASSESSMENT AND PLAN:  Stage IV NSCLC, Rt lung adenocarcinoma with metastasis to pleura, mediastinum , rt pleural effusion and brain diagnosed 1/2022 (AJCC 8th edition)  PD-L1 TPS 2-3% by [facility]   NGS [facility] panel-EML4:ALK rearragement; chr2:[id number], chr2:[id number]  NGS [facility]- GNAS R201H, KRAS K5E- No ALK    He began Alectinib 600 mg BID 3/2/22 and unfortunately developed grade 3 myalgias  requiring dose reduction to 300 mg BID. In Dec 2022,we stopped drug 12/20/22 due to unremitting arthalgias, eventully had to starte PO steroids which led to improvement and resolved. Radiographicaly, he has had a near CR to Rx in the lung, has a residual rt LL lesion.     Began brigatinib 2/22/23 (delayed due to pt hesitancy). Developed severe cough on day 2 so brigatinib was held and cough resolved with in 24-48 hours. He restarted 60 mg daily and upped it to 90 mg.Restging CT showing stable disease in the lung, however, MRI with several contrast enhancement and increase in size of previously treated lesions. His dose was increased to 180 mg daily to address possible CNS disease progression and started on dexamethasone. 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 Restaging CT in 9/2023 showing overall disease stablity with no evidence of active cancer. We  opted to continue Brigatinib at 120 mg and treat him for radiation necrosis.  PET/CT after these three months showed oligoperisstent disease with a single focus of active malignancy in the RLL. Met with Thoracic surgery 12/2023 to discuss resection but they recommended against it due to risk for scarring after. 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 Most recent CT showing stable disease but brain MRI showing several possible new lesions and enlargement of exissting lesion (see below).     Switched Rx to Lorlatinib due to good CNS penetration. Repeat MRI end of August (6 weeks on Rx) with overall positive response, but a few enlarging and edematous lesions. On discussion with Dr. Arango this was though to be Rx related changes and plan to to next MRI in 3 months.     Now after about 4 months of therapy, has developed grade 3 fatigue and arthralgias/myalgias.  After 1 week off, we resumed at 75 mg daily. Tolerating this much better.   He has started ozempic.  He reports compliant with his DM medications but has not been checking BP and sugars regualry.   His most recent CT showing stabled disease in the chest.       Plan  Continue lorlatinib 75 mg daily  RTC with NP/PA in 2 weeks  Will discuss with rad onc about new mri findings  Next CT in 3 months      # Severe HTN: on propranolol, lisinopril and amlodipine, previously hydrochlorothiazide was discontinued due to episode of syncope in 07/2024. Reports compliance.   Today's BP is very high, reports taking all of his anti HTN meds- does not check BP  - due to episode of vomiting and Hx of brain mets and being on blood thinn er, I amworried about a stroke/bleed  - Discussed with pt to go to ER      #Chronic R pleuritic pain  #Right pleuritic/chest pain  Felt to be 2/2 prior pleurex drain.   -On methadone and oxycodone; followed by Palliative Care   Suspect this could be msk based on precipitating  event and characteristics on exam. We talked about mgmt with heat and flexeril (PTA med) prn. Other differential of course is malignancy or pna, CT is scheduled for next week to rule out.     # Brain mets:   # Radiation necrosis,   -Baseline Brain MRI with several brain mets, s/p GK to 11-12 brain mets. F/u Brain MRI in June 2022 was showing enlargement of the one of the lesions along with edema, therefore had to undergo craniotomy followed by resection, the final biopsy consistent with radiation necrosis.   -received two doses bevacizumab for radiation necrosis in Fall 2022, tolerated poorly with HTN urgency and PE.)   -MRI in 4/2023 now showing contrast enhancement of lesions and a dominate lesion in the R frontal region with edema, several other smaller lesion. Short term MRI showing increase in size of the rt frontal lesion, underwent resection, patho again showing radiation necrosis. Restarted bevacizumab, which resulted in improved radiation necrosis / vasogenic edema on imaging in 10/2023 but ultimately was stopped due to uncontrolled HTN, last dose being 11/2023  -MRI imaging 1/2024 with stable disease and no edema.  -Brain MRI 3/2024 cancelled due to lapse in medical insurance.  -MRI 6/2024 showing showing several possible new lesions and enlargement of exissting lesion, however I am unsure if this is due to better imaging (perfusion MRI this time). Reviewed with rad onc and elected for short term MRI scan following initiation of lorlatinib, hold off on GK.  -MRI 8/28/24 with overall positive treatment response from rx but Two metastases (right parietal, left cerebellar) appears slightly enlarged, and Increased vasogenic edema associated with right temporal and left cerebellar metastases.    9/4/24. Dr. Arango reviewed brain MRI and feels ongoing surveillance for now is adequate. R parietal lesion overall stable compared to April 2023 MRI and while L cerebellar is more pronounced, it is stable in size and  in prior treatment.   Repeat Brain MRI with slight increase in enhancement, without increase in size.Will discuss with rad onc about the signficinace of these findings. Since lorlatinib is very effective in CNS, we will hold off on doing GK, unless there continues to be concern for progression      #hypercholesteremia   Recently Increased rosuvastatin from 5 to 10 mg; confirmed he is taking, Tryglycierides improving, follow up cholesterol in 3 months      #Diabetes, Type 2  Often with hyperglycemia on labs. Compliant with insulin but not very adherent to diet. Started on metformin , and insulin. And  ozempic    #PE: provoked by malignancy vs bevacizumab in 11/2022  -- on rivaroxiabn 20 mg daily    #Grade 2-3 arthralgias    All joints affected, no morning stiffness, normal ESR and CRP  -felt to be 2/2 treatment.  Improved with lower dose      On the day of service  Chart review: 5 minutes  Visit duration: 30 minutes  Care coordination: 5 minutes    Bassam Persaud MD    Hematology, Oncology and Transplantation    Addendum: Labs returing for glucose >500, spoke to ER julieta adams gave them a heads up./

## 2024-12-30 NOTE — PROGRESS NOTES
Is the patient currently in the state of [place]? YES    Visit mode:VIDEO    If the visit is dropped, the patient can be reconnected by:VIDEO VISIT: Send to e-mail at: [email or web address]    Will anyone else be joining the visit? NO  (If patient encounters technical issues they should call [phone]    Are changes needed to the allergy or medication list? No    Are refills needed on medications prescribed by this physician? NO    Rooming Documentation:  Questionnaire(s) completed    Reason for visit: Video Visit    Celi OCONNOR

## 2025-01-01 ENCOUNTER — MYC REFILL (OUTPATIENT)
Dept: FAMILY MEDICINE | Facility: CLINIC | Age: 45
End: 2025-01-01
Payer: COMMERCIAL

## 2025-01-01 ENCOUNTER — MYC REFILL (OUTPATIENT)
Dept: ONCOLOGY | Facility: CLINIC | Age: 45
End: 2025-01-01
Payer: COMMERCIAL

## 2025-01-01 ENCOUNTER — MYC REFILL (OUTPATIENT)
Dept: RADIATION ONCOLOGY | Facility: CLINIC | Age: 45
End: 2025-01-01
Payer: COMMERCIAL

## 2025-01-01 DIAGNOSIS — I10 ESSENTIAL HYPERTENSION: ICD-10-CM

## 2025-01-01 DIAGNOSIS — R53.0 NEOPLASTIC MALIGNANT RELATED FATIGUE: ICD-10-CM

## 2025-01-01 DIAGNOSIS — M79.10 MYALGIA: ICD-10-CM

## 2025-01-01 DIAGNOSIS — C34.31 MALIGNANT NEOPLASM OF LOWER LOBE OF RIGHT LUNG (H): ICD-10-CM

## 2025-01-01 DIAGNOSIS — C34.90 NON-SMALL CELL LUNG CANCER, UNSPECIFIED LATERALITY (H): ICD-10-CM

## 2025-01-01 DIAGNOSIS — C79.31 MALIGNANT NEOPLASM METASTATIC TO BRAIN (H): ICD-10-CM

## 2025-01-01 DIAGNOSIS — E11.65 TYPE 2 DIABETES MELLITUS WITH HYPERGLYCEMIA, WITHOUT LONG-TERM CURRENT USE OF INSULIN (H): ICD-10-CM

## 2025-01-01 RX ORDER — METHADONE HYDROCHLORIDE 10 MG/1
TABLET ORAL
Status: CANCELLED | OUTPATIENT
Start: 2025-01-01

## 2025-01-02 ENCOUNTER — VIRTUAL VISIT (OUTPATIENT)
Dept: PALLIATIVE CARE | Facility: CLINIC | Age: 45
End: 2025-01-02
Attending: FAMILY MEDICINE
Payer: COMMERCIAL

## 2025-01-02 DIAGNOSIS — Z51.5 PALLIATIVE CARE PATIENT: Primary | ICD-10-CM

## 2025-01-02 DIAGNOSIS — C34.90 NON-SMALL CELL LUNG CANCER, UNSPECIFIED LATERALITY (H): ICD-10-CM

## 2025-01-02 DIAGNOSIS — G89.3 CANCER ASSOCIATED PAIN: ICD-10-CM

## 2025-01-02 DIAGNOSIS — C79.31 NSCLC METASTATIC TO BRAIN (H): ICD-10-CM

## 2025-01-02 DIAGNOSIS — C34.90 NSCLC METASTATIC TO BRAIN (H): ICD-10-CM

## 2025-01-02 RX ORDER — LISINOPRIL 40 MG/1
40 TABLET ORAL DAILY
Qty: 90 TABLET | Refills: 0 | Status: SHIPPED | OUTPATIENT
Start: 2025-01-02

## 2025-01-02 RX ORDER — METHYLPHENIDATE HYDROCHLORIDE 5 MG/1
5-10 TABLET ORAL 2 TIMES DAILY PRN
Qty: 90 TABLET | Refills: 0 | Status: SHIPPED | OUTPATIENT
Start: 2025-01-02

## 2025-01-02 NOTE — TELEPHONE ENCOUNTER
Clinic RN: Please investigate patient's chart or contact patient if the information cannot be found because the medication is listed as historical or discontinued. Confirm patient is taking this medication. Document findings and route refill encounter to provider for approval or denial.     Maria De Jesus JONAS RN  [facility]

## 2025-01-02 NOTE — TELEPHONE ENCOUNTER
Received [employer] message from patient requesting refill of  methylphenidate .     Last refill: 11/12/24  Last office visit: 10/21/24  Scheduled for follow up 1/2/25     Will route request to MD/ for review.     Reviewed [place]  Report.

## 2025-01-02 NOTE — PATIENT INSTRUCTIONS
It was good to see you today, Connor.  Happy New Year.    Here are the things we talked about:  No change to your medications.  Try to minimize your oxycodone use as the stiffness improves with the dose reduction of your cancer therapy.    Someone from the team will reach out to schedule a follow up appointment in 2 months and sooner, if needed.     How to get a hold of us:  For non-urgent matters, MyChart works best.    For more urgent matters, or if you prefer not to use MyChart, call our clinic nurse coordinator Reyna Ma RN at [phone]    We have an on-call number for evenings and weekends. Please call this only if you are having uncontrolled symptoms or serious side effects from your medicines: [phone].     For refills, please give us a week (5 working days) notice. We don't always have providers available everyday to do refills. If you call the day you run out of your medicine, we may not be able to refill it in time, so call 5 days in advance!    Ajith Brewer MD MS FAAFP CAQHPM  [facility]  Office [phone]  Fax [phone]

## 2025-01-02 NOTE — NURSING NOTE
Current patient location: [address] [place] [address]   [facility] [address]    Is the patient currently in the state of [place]? YES    Visit mode:VIDEO    If the visit is dropped, the patient can be reconnected by:VIDEO VISIT: Text to cell phone:   Telephone Information:   Mobile [phone]       Will anyone else be joining the visit? NO  (If patient encounters technical issues they should call [phone])    Are changes needed to the allergy or medication list? Pt stated no changes to allergies and Pt stated no med changes    Are refills needed on medications prescribed by this physician? NO    Rooming Documentation:  Unable to complete questionnaire(s) due to time    Reason for visit: RECHECK    Herber OCONNOR

## 2025-01-02 NOTE — PROGRESS NOTES
Virtual Visit Details    Type of service:  Video Visit   Video Start Time: 4:43 PM  Video End Time:5:05 PM    Originating Location (pt. Location): Home    Distant Location (provider location):  On-site  Platform used for Video Visit: [facility]    Palliative Care Outpatient Clinic Progress Note    Patient Name: Connor Emerson  Primary Provider: Jessica Mohr    Impression & Recommendations & Counseling:  Impression & Recommendations & Counseling:  Connor Emerson is a 44 year old male with history of NSCLC with ALK rearrangement and mets to the brain s/p gamma knife treatments and craniotomy open excision and currently on a second line TKI. He is experiencing 'stiffness' from the TKI and this is better overall and worse when he works. He has a lot of cancer related fatigue.  ECO  Decisional Capacity: very present     PDMP review:  Yes, no concerns     Metastatic NSCLC with ALK rearrangement  S/p GK to brain mets and craniotomy for excision  Cancer associated pain  Fibromyalgia-like somatic soreness--on duloxetine, flexeril and prozac  HTN  Headaches--overall better and Connor prefers to minimize use of steroids, if at all possible.  Cancer associated fatigue     Goals of Care:  10/21/2024   No change to GOC.  2024  Connor feels like he is living on borrowed time--he was once told he had to live until about 2023 and he worries this may be his last summer and he doesn't want to spend it recovering from surgery or feeling poorly.  So he is going to do a PET scan and if it is negative he for sure would not want surgery.  He is swayed by surgeons feeling it is scar tissue from his previous chest tube.  If there is recurrent tumor Connor would consider radiation therapy and he is also thinking it might be time to put his focus on a comfortable death.  He seems to have a very sound decision making framework and he's just missing some data at this time.  2023  no change in GOC  3/1/2023 Connor wants to continue  cancer-directed therapies as long as the side effects are tolerable.  He is a FULL code should he have a cardiac arrest. He is OK being cared for in the acute care hospital if needed.     Recommendations & Counseling:  Continue cancer care per Dr. Persaud and his team  CONTINUE Methadone 7.5 MG PO bid (He will clarify with his wife what his dose is--PDMP dispensing shows he may on be on 5 mg po BID)  Continue oxycodone 5-10 mg po q 4 hours prn;  Continue to hold Ambien   Continue Duloxetine and cyclobenzaprine and Prozac  Narcan nasal spray rx also sent to pharmacy last visit  List of community counseling resources provided in the AVS.  List of sleep hygiene ideas provided in AVS     CONTINUE  Ritalin 5-10 mg po when first rising and at noon, if needed.      F/up in 8 weeks and sooner prn.          Counseling: All of the above was explained to the patient in lay language. The patient has verbalized a clear understanding of the discussion, asked appropriate questions, which have been answered to patient's apparent satisfaction. The patient is in agreement with the above plan.        Chief Complaint/Patient ID: Connor Emerson 44 year old male with PMHx of  NSCLC with ALK rearrangement and mets to the brain s/p gamma knife treatments and craniotomy open excision and currently on a second line TKI. He is experiencing 'stiffness' from the TKI that has improved with scheduled flexeril at HS and he switched to lorlatinib in early July 2024       Last Palliative care appointment: 10/21/2024 with me     Reviewed:  Yes:   reviewed - controlled substances reflected in medication list.    Interim History:  Connor Emerson is a 44 year old male who is seen today for follow up with Palliative Care via billable video visit. He feels better since his cancer therapy was reduced--much less pain and stiffness.  He had some headaches recently and wonders if he wasn't having some seizures involving his right hand.  This was similar  to some things he experienced after one of his craniotomies.  This has not recurred since he increased his Keppra.    He started Ozempic for his diabetes and that has stimulated his appetite     Pain:  lessened with cancer therapy reduction.    Appetite/Nausea: increased with ozempic     Bowels: no OIC      Sleep: mostly OK     Mood: less depression     Coping:  overall good;     Family History- Reviewed in Epic.    Allergies   Allergen Reactions    Vicodin [Hydrocodone-Acetaminophen] Nausea and Vomiting and GI Disturbance       Social History:  Pertinent changes to social history/social situation since last visit: none  Key support resources: family  Advance Directive Status:  no ACP documents    Social History     Tobacco Use    Smoking status: Never     Passive exposure: Never    Smokeless tobacco: Never   Vaping Use    Vaping status: Never Used   Substance Use Topics    Alcohol use: Yes     Alcohol/week: 0.0 standard drinks of alcohol     Comment: social    Drug use: No         Allergies   Allergen Reactions    Vicodin [Hydrocodone-Acetaminophen] Nausea and Vomiting and GI Disturbance     Current Outpatient Medications   Medication Sig Dispense Refill    acetaminophen (TYLENOL) 325 MG tablet Take 325-650 mg by mouth every 6 hours as needed for mild pain      albuterol (PROAIR HFA/PROVENTIL HFA/VENTOLIN HFA) 108 (90 Base) MCG/ACT inhaler Inhale 2 puffs into the lungs every 6 hours as needed for shortness of breath, wheezing or cough (Patient not taking: Reported on 10/16/2024) 18 g 0    Alcohol Swabs PADS Use to swab the area of the injection or jabier as directed. Per insurance coverage 100 each 0    amLODIPine (NORVASC) 10 MG tablet Take 1 tablet (10 mg) by mouth daily. 90 tablet 0    blood glucose (NO BRAND SPECIFIED) lancets standard To use to test glucose level in the blood Use to test blood sugar  4  times daily as directed. To accompany glucose monitor brands per insurance coverage. 100 each 3    blood  glucose (NO BRAND SPECIFIED) test strip To use to test glucose level in the blood Use to test blood sugar  4 times daily as directed. To accompany glucose monitor brands per insurance coverage. 100 strip 3    Blood Glucose Monitoring Suppl (ACCU-CHEK GUIDE) w/Device KIT       Continuous Glucose  (FREESTYLE IRENE 3 READER) YVES       Continuous Glucose Sensor (FREESTYLE IRENE 3 SENSOR) MISC 1 each every 14 days. Use 1 sensor every 14 days. Use to read blood sugars per 's instructions. 6 each 3    cyclobenzaprine (FLEXERIL) 5 MG tablet Take 2 tablets (10 mg) by mouth nightly as needed for muscle spasms 30 tablet 4    DULoxetine (CYMBALTA) 30 MG capsule Take 1 capsule (30 mg) by mouth 2 times daily. 60 capsule 2    empagliflozin (JARDIANCE) 25 MG TABS tablet Take 1 tablet (25 mg) by mouth daily. 90 tablet 1    FLUoxetine (PROZAC) 20 MG capsule Take 1 capsule (20 mg) by mouth daily. 30 capsule 1    insulin aspart (NOVOLOG PEN) 100 UNIT/ML pen Inject 0-40 Units Subcutaneous 3 times daily (before meals) Per discharge instructions sliding scale (Patient not taking: Reported on 8/5/2024) 45 mL 2    insulin glargine (LANTUS PEN) 100 UNIT/ML pen Inject 80 Units subcutaneously every morning. 30 mL 1    insulin pen needle (32G X 4 MM) 32G X 4 MM miscellaneous Use as directed by provider. Per insurance coverage 100 each 0    levETIRAcetam (KEPPRA) 1000 MG tablet Take 1 tablet (1,000 mg) by mouth 2 times daily 60 tablet 11    levETIRAcetam (KEPPRA) 500 MG tablet Take 1 tablet (500 mg) by mouth 2 times daily. Add this to your prescribed 1000 mg dose twice daily to equate to 1500 mg twice daily. 90 tablet 0    lisinopril (ZESTRIL) 40 MG tablet Take 1 tablet (40 mg) by mouth daily. 90 tablet 0    [START ON 1/11/2025] lorlatinib (LORBRENA) 25 MG Take 3 tablets (75 mg) by mouth daily 90 tablet 0    lorlatinib (LORBRENA) 25 MG Take 3 tablets (75 mg) by mouth daily 90 tablet 0    metFORMIN (GLUCOPHAGE) 500 MG tablet  Take 1 tablet (500 mg) by mouth 2 times daily (with meals) 60 tablet 3    methadone (DOLOPHINE) 10 MG tablet       methadone (DOLOPHINE) 5 MG tablet Take 1 tablet (5 mg) by mouth 2 times daily. (Patient not taking: Reported on 12/16/2024) 60 tablet 0    methylphenidate (RITALIN) 5 MG tablet Take 1-2 tablets (5-10 mg) by mouth 2 times daily as needed (fatigue). Take second dose by 12 noon, if it is needed 90 tablet 0    naloxone (NARCAN) 4 MG/0.1ML nasal spray Spray 1 spray (4 mg) into one nostril alternating nostrils as needed for opioid reversal every 2-3 minutes until assistance arrives (Patient not taking: Reported on 9/4/2024) 0.2 mL 3    oxyCODONE IR (ROXICODONE) 10 MG tablet Take 1 tablet (10 mg) by mouth every 3 hours as needed for moderate pain. 120 tablet 0    prochlorperazine (COMPAZINE) 10 MG tablet Take 1 tablet (10 mg) by mouth every 6 hours as needed for nausea or vomiting (Patient not taking: Reported on 8/5/2024) 30 tablet 2    propranolol (INDERAL) 20 MG tablet Take 1 tablet (20 mg) by mouth 2 times daily 60 tablet 1    rivaroxaban ANTICOAGULANT (XARELTO) 20 MG TABS tablet Take 1 tablet (20 mg) by mouth daily (with dinner) 90 tablet 3    rosuvastatin (CRESTOR) 10 MG tablet Take 1 tablet (10 mg) by mouth daily. 90 tablet 2    semaglutide (OZEMPIC) 2 MG/3ML pen Inject 0.5 mg subcutaneously every 7 days. For 4 weeks then increase to 0.5 mg once weekly if tolerating 3 mL 0     Past Medical History:   Diagnosis Date    Atypical chest pain 12/02/2013    Cancer (H)     Complication of anesthesia     Diabetes (H)     GERD (gastroesophageal reflux disease) 12/02/2013    History of pulmonary embolism     HTN (hypertension) 05/14/2012    HTN, goal below 140/90 07/02/2013    Insomnia 02/21/2012    Mediastinal lymphadenopathy     Metastasis to brain (H) 2022    Migraine headache 07/02/2013    Migraine with aura, without mention of intractable migraine without mention of status migrainosus     Pneumonia      Past  Surgical History:   Procedure Laterality Date    BRONCHOSCOPY RIGID OR FLEXIBLE W/TRANSENDOSCOPIC ENDOBRONCHIAL ULTRASOUND GUIDED Bilateral 1/26/2022    Procedure: Right BRONCHOSCOPY, FIBEROPTIC, endobronchial ultrasound, pleural biopsy;  Surgeon: Dallin Agrawal MD;  Location: [facility] OR    INJECT BLOCK MEDIAL BRANCH CERVICAL/THORACIC/LUMBAR      INSERT CHEST TUBE Right 2/16/2022    Procedure: INSERTION, CATHETER, INTERCOSTAL, FOR DRAINAGE;  Surgeon: Dallin Agrawal MD;  Location: [facility] GI    INSERT CHEST TUBE Right 3/9/2022    Procedure: INSERTION, CATHETER, INTERCOSTAL, FOR DRAINAGE;  Surgeon: Sushila Antonio MD;  Location: [facility] GI    IR CHEST TUBE REMOVAL TUNNELED RIGHT  4/2/2022    OPTICAL TRACKING SYSTEM CRANIOTOMY, EXCISE TUMOR, COMBINED Left 6/28/2022    Procedure: Left stealth craniotomy for tumor resection with motor mapping;  Surgeon: Stephen Moran MD;  Location:  OR    OPTICAL TRACKING SYSTEM CRANIOTOMY, EXCISE TUMOR, COMBINED Right 6/27/2023    Procedure: Right stealth craniotomy and resection of tumor;  Surgeon: Stephen Moran MD;  Location:  OR    ORTHOPEDIC SURGERY      Ganesh. Rotator cuff repair.    PLEUROSCOPY N/A 1/26/2022    Procedure: Pleuroscopy with Pleural Biopsy;  Surgeon: Dallin Agrawal MD;  Location: [facility] OR       Physical Exam:   GENERAL APPEARANCE: robust and healthy appearing, alert and no distress; neatly groomed  EYES: Eyes grossly normal to inspection, PERRLA, conjunctivae and sclerae without injection or discharge, EOM intact   RESP:  no increased work of breathing; speaks in complete sentences;   MS: No musculoskeletal defects are noted  SKIN: No suspicious lesions or rashes, hydration status appears adequate with normal skin turgor   PSYCH: Alert and oriented x3; speech- coherent , normal rate and volume; able to articulate logical thoughts, able to abstract reason, no tangential thoughts, no hallucinations or delusions, mentation appears normal, Mood is euthymic. Affect is  appropriate for this mood state and bright. Thought content is free of suicidal ideation, hallucinations, and delusions.  Eye contact is good during conversation.       Key Data Reviewed:  LABS:  Cr 0.60, Albumin 4.4,  Hgb 15.8,      IMAGIN2024 CT CAP W/CONTRAST  IMPRESSION:  1.  Stable right lower lobe nodular opacity, likely related to  posttreatment changes. No new disease in the chest, abdomen and  pelvis.     2024 MR BRAIN WO & W/CONTRAST  IMPRESSION:  1.  The dural thickening and enhancement deep to the craniotomy in the deeper patchy enhancement associated with the resection cavity in the orbital right frontal lobe is able to slightly decreased compared to 2024 but mild to moderately decreased   compared to 2024.  2.  The bilobed peripheral enhancement involving the left cerebellar hemisphere appear less cystic and slightly more nodular and thicker compared to 2024. The surrounding FLAIR hyperintensity is slightly increased compared to 2024 but   moderately increased compared to 2024.  3.  The dural thickening and enhancement as well as the deeper patchy enhancement surrounding the resection cavity in the posterior left frontal lobe measuring approximately 1.1 x 1.7 cm is not significantly changed.  4.  No significant change in the approximately 1.1 x 1.4 cm enhancement in the inferior right temporal lobe. The surrounding FLAIR hyperintensity is also not significantly changed.  5.  The other known enhancing intracranial lesions are stable.  6.  No new enhancing intracranial lesions. No superimposed acute intracranial pathology    25 minutes spent on the date of the encounter doing chart review, history and exam, patient education & counseling, documentation and other activities as noted above.    The longitudinal plan of care for the diagnosis(es)/condition(s) as documented were addressed during this visit. Due to the added complexity in care, I will continue  to support Connor in the subsequent management and with ongoing continuity of care.    Ajith Brewer MD MS FAAFP CAQHPM  [facility]  Office [phone]  Fax [phone]

## 2025-01-02 NOTE — LETTER
2025       RE: Connor Emerson  [address]  [facility] [address]     Dear Colleague,    Thank you for referring your patient, Connor Emerson, to the [facility] CANCER CLINIC at [facility]. Please see a copy of my visit note below.    Virtual Visit Details    Type of service:  Video Visit   Video Start Time: 4:43 PM  Video End Time:5:05 PM    Originating Location (pt. Location): Home    Distant Location (provider location):  On-site  Platform used for Video Visit: [facility]    Palliative Care Outpatient Clinic Progress Note    Patient Name: Connor Emerson  Primary Provider: Jessica Mohr    Impression & Recommendations & Counseling:  Impression & Recommendations & Counseling:  Connor Emerson is a 44 year old male with history of NSCLC with ALK rearrangement and mets to the brain s/p gamma knife treatments and craniotomy open excision and currently on a second line TKI. He is experiencing 'stiffness' from the TKI and this is better overall and worse when he works. He has a lot of cancer related fatigue.  ECO  Decisional Capacity: very present     PDMP review:  Yes, no concerns     Metastatic NSCLC with ALK rearrangement  S/p GK to brain mets and craniotomy for excision  Cancer associated pain  Fibromyalgia-like somatic soreness--on duloxetine, flexeril and prozac  HTN  Headaches--overall better and Connor prefers to minimize use of steroids, if at all possible.  Cancer associated fatigue     Goals of Care:  10/21/2024   No change to GOC.  2024  Connor feels like he is living on borrowed time--he was once told he had to live until about 2023 and he worries this may be his last summer and he doesn't want to spend it recovering from surgery or feeling poorly.  So he is going to do a PET scan and if it is negative he for sure would not want surgery.  He is swayed by surgeons feeling it is scar tissue from his previous chest tube.  If  there is recurrent tumor Connor would consider radiation therapy and he is also thinking it might be time to put his focus on a comfortable death.  He seems to have a very sound decision making framework and he's just missing some data at this time.  12/19/2023  no change in GOC  3/1/2023 Connor wants to continue cancer-directed therapies as long as the side effects are tolerable.  He is a FULL code should he have a cardiac arrest. He is OK being cared for in the acute care hospital if needed.     Recommendations & Counseling:  Continue cancer care per Dr. Persaud and his team  CONTINUE Methadone 7.5 MG PO bid (He will clarify with his wife what his dose is--PDMP dispensing shows he may on be on 5 mg po BID)  Continue oxycodone 5-10 mg po q 4 hours prn;  Continue to hold Ambien   Continue Duloxetine and cyclobenzaprine and Prozac  Narcan nasal spray rx also sent to pharmacy last visit  List of community counseling resources provided in the AVS.  List of sleep hygiene ideas provided in AVS     CONTINUE  Ritalin 5-10 mg po when first rising and at noon, if needed.      F/up in 8 weeks and sooner prn.          Counseling: All of the above was explained to the patient in lay language. The patient has verbalized a clear understanding of the discussion, asked appropriate questions, which have been answered to patient's apparent satisfaction. The patient is in agreement with the above plan.        Chief Complaint/Patient ID: Connor Emerson 44 year old male with PMHx of  NSCLC with ALK rearrangement and mets to the brain s/p gamma knife treatments and craniotomy open excision and currently on a second line TKI. He is experiencing 'stiffness' from the TKI that has improved with scheduled flexeril at  and he switched to lorlatinib in early July 2024       Last Palliative care appointment: 10/21/2024 with me     Reviewed:  Yes:   reviewed - controlled substances reflected in medication list.    Interim History:  Connor GRIMALDO  Idania is a 44 year old male who is seen today for follow up with Palliative Care via billable video visit. He feels better since his cancer therapy was reduced--much less pain and stiffness.  He had some headaches recently and wonders if he wasn't having some seizures involving his right hand.  This was similar to some things he experienced after one of his craniotomies.  This has not recurred since he increased his Keppra.    He started Ozempic for his diabetes and that has stimulated his appetite     Pain:  lessened with cancer therapy reduction.    Appetite/Nausea: increased with ozempic     Bowels: no OIC      Sleep: mostly OK     Mood: less depression     Coping:  overall good;     Family History- Reviewed in Epic.    Allergies   Allergen Reactions     Vicodin [Hydrocodone-Acetaminophen] Nausea and Vomiting and GI Disturbance       Social History:  Pertinent changes to social history/social situation since last visit: none  Key support resources: family  Advance Directive Status:  no ACP documents    Social History     Tobacco Use     Smoking status: Never     Passive exposure: Never     Smokeless tobacco: Never   Vaping Use     Vaping status: Never Used   Substance Use Topics     Alcohol use: Yes     Alcohol/week: 0.0 standard drinks of alcohol     Comment: social     Drug use: No         Allergies   Allergen Reactions     Vicodin [Hydrocodone-Acetaminophen] Nausea and Vomiting and GI Disturbance     Current Outpatient Medications   Medication Sig Dispense Refill     acetaminophen (TYLENOL) 325 MG tablet Take 325-650 mg by mouth every 6 hours as needed for mild pain       albuterol (PROAIR HFA/PROVENTIL HFA/VENTOLIN HFA) 108 (90 Base) MCG/ACT inhaler Inhale 2 puffs into the lungs every 6 hours as needed for shortness of breath, wheezing or cough (Patient not taking: Reported on 10/16/2024) 18 g 0     Alcohol Swabs PADS Use to swab the area of the injection or jabier as directed. Per insurance coverage 100 each  0     amLODIPine (NORVASC) 10 MG tablet Take 1 tablet (10 mg) by mouth daily. 90 tablet 0     blood glucose (NO BRAND SPECIFIED) lancets standard To use to test glucose level in the blood Use to test blood sugar  4  times daily as directed. To accompany glucose monitor brands per insurance coverage. 100 each 3     blood glucose (NO BRAND SPECIFIED) test strip To use to test glucose level in the blood Use to test blood sugar  4 times daily as directed. To accompany glucose monitor brands per insurance coverage. 100 strip 3     Blood Glucose Monitoring Suppl (ACCU-CHEK GUIDE) w/Device KIT        Continuous Glucose  (FREESTYLE IRENE 3 READER) YVES        Continuous Glucose Sensor (FREESTYLE IRENE 3 SENSOR) MISC 1 each every 14 days. Use 1 sensor every 14 days. Use to read blood sugars per 's instructions. 6 each 3     cyclobenzaprine (FLEXERIL) 5 MG tablet Take 2 tablets (10 mg) by mouth nightly as needed for muscle spasms 30 tablet 4     DULoxetine (CYMBALTA) 30 MG capsule Take 1 capsule (30 mg) by mouth 2 times daily. 60 capsule 2     empagliflozin (JARDIANCE) 25 MG TABS tablet Take 1 tablet (25 mg) by mouth daily. 90 tablet 1     FLUoxetine (PROZAC) 20 MG capsule Take 1 capsule (20 mg) by mouth daily. 30 capsule 1     insulin aspart (NOVOLOG PEN) 100 UNIT/ML pen Inject 0-40 Units Subcutaneous 3 times daily (before meals) Per discharge instructions sliding scale (Patient not taking: Reported on 8/5/2024) 45 mL 2     insulin glargine (LANTUS PEN) 100 UNIT/ML pen Inject 80 Units subcutaneously every morning. 30 mL 1     insulin pen needle (32G X 4 MM) 32G X 4 MM miscellaneous Use as directed by provider. Per insurance coverage 100 each 0     levETIRAcetam (KEPPRA) 1000 MG tablet Take 1 tablet (1,000 mg) by mouth 2 times daily 60 tablet 11     levETIRAcetam (KEPPRA) 500 MG tablet Take 1 tablet (500 mg) by mouth 2 times daily. Add this to your prescribed 1000 mg dose twice daily to equate to 1500 mg  twice daily. 90 tablet 0     lisinopril (ZESTRIL) 40 MG tablet Take 1 tablet (40 mg) by mouth daily. 90 tablet 0     [START ON 1/11/2025] lorlatinib (LORBRENA) 25 MG Take 3 tablets (75 mg) by mouth daily 90 tablet 0     lorlatinib (LORBRENA) 25 MG Take 3 tablets (75 mg) by mouth daily 90 tablet 0     metFORMIN (GLUCOPHAGE) 500 MG tablet Take 1 tablet (500 mg) by mouth 2 times daily (with meals) 60 tablet 3     methadone (DOLOPHINE) 10 MG tablet        methadone (DOLOPHINE) 5 MG tablet Take 1 tablet (5 mg) by mouth 2 times daily. (Patient not taking: Reported on 12/16/2024) 60 tablet 0     methylphenidate (RITALIN) 5 MG tablet Take 1-2 tablets (5-10 mg) by mouth 2 times daily as needed (fatigue). Take second dose by 12 noon, if it is needed 90 tablet 0     naloxone (NARCAN) 4 MG/0.1ML nasal spray Spray 1 spray (4 mg) into one nostril alternating nostrils as needed for opioid reversal every 2-3 minutes until assistance arrives (Patient not taking: Reported on 9/4/2024) 0.2 mL 3     oxyCODONE IR (ROXICODONE) 10 MG tablet Take 1 tablet (10 mg) by mouth every 3 hours as needed for moderate pain. 120 tablet 0     prochlorperazine (COMPAZINE) 10 MG tablet Take 1 tablet (10 mg) by mouth every 6 hours as needed for nausea or vomiting (Patient not taking: Reported on 8/5/2024) 30 tablet 2     propranolol (INDERAL) 20 MG tablet Take 1 tablet (20 mg) by mouth 2 times daily 60 tablet 1     rivaroxaban ANTICOAGULANT (XARELTO) 20 MG TABS tablet Take 1 tablet (20 mg) by mouth daily (with dinner) 90 tablet 3     rosuvastatin (CRESTOR) 10 MG tablet Take 1 tablet (10 mg) by mouth daily. 90 tablet 2     semaglutide (OZEMPIC) 2 MG/3ML pen Inject 0.5 mg subcutaneously every 7 days. For 4 weeks then increase to 0.5 mg once weekly if tolerating 3 mL 0     Past Medical History:   Diagnosis Date     Atypical chest pain 12/02/2013     Cancer (H)      Complication of anesthesia      Diabetes (H)      GERD (gastroesophageal reflux disease)  12/02/2013     History of pulmonary embolism      HTN (hypertension) 05/14/2012     HTN, goal below 140/90 07/02/2013     Insomnia 02/21/2012     Mediastinal lymphadenopathy      Metastasis to brain (H) 2022     Migraine headache 07/02/2013     Migraine with aura, without mention of intractable migraine without mention of status migrainosus      Pneumonia      Past Surgical History:   Procedure Laterality Date     BRONCHOSCOPY RIGID OR FLEXIBLE W/TRANSENDOSCOPIC ENDOBRONCHIAL ULTRASOUND GUIDED Bilateral 1/26/2022    Procedure: Right BRONCHOSCOPY, FIBEROPTIC, endobronchial ultrasound, pleural biopsy;  Surgeon: Dallin Agrawal MD;  Location: [facility] OR     INJECT BLOCK MEDIAL BRANCH CERVICAL/THORACIC/LUMBAR       INSERT CHEST TUBE Right 2/16/2022    Procedure: INSERTION, CATHETER, INTERCOSTAL, FOR DRAINAGE;  Surgeon: Dallin Agrawal MD;  Location: [facility] GI     INSERT CHEST TUBE Right 3/9/2022    Procedure: INSERTION, CATHETER, INTERCOSTAL, FOR DRAINAGE;  Surgeon: Sushila Antonio MD;  Location: [facility] GI     IR CHEST TUBE REMOVAL TUNNELED RIGHT  4/2/2022     OPTICAL TRACKING SYSTEM CRANIOTOMY, EXCISE TUMOR, COMBINED Left 6/28/2022    Procedure: Left stealth craniotomy for tumor resection with motor mapping;  Surgeon: Stephen Moran MD;  Location:  OR     OPTICAL TRACKING SYSTEM CRANIOTOMY, EXCISE TUMOR, COMBINED Right 6/27/2023    Procedure: Right stealth craniotomy and resection of tumor;  Surgeon: Stephen Moran MD;  Location:  OR     ORTHOPEDIC SURGERY      Ganesh. Rotator cuff repair.     PLEUROSCOPY N/A 1/26/2022    Procedure: Pleuroscopy with Pleural Biopsy;  Surgeon: Dallin Agrawal MD;  Location: [facility] OR       Physical Exam:   GENERAL APPEARANCE: robust and healthy appearing, alert and no distress; neatly groomed  EYES: Eyes grossly normal to inspection, PERRLA, conjunctivae and sclerae without injection or discharge, EOM intact   RESP:  no increased work of breathing; speaks in complete sentences;   MS: No  musculoskeletal defects are noted  SKIN: No suspicious lesions or rashes, hydration status appears adequate with normal skin turgor   PSYCH: Alert and oriented x3; speech- coherent , normal rate and volume; able to articulate logical thoughts, able to abstract reason, no tangential thoughts, no hallucinations or delusions, mentation appears normal, Mood is euthymic. Affect is appropriate for this mood state and bright. Thought content is free of suicidal ideation, hallucinations, and delusions.  Eye contact is good during conversation.       Key Data Reviewed:  LABS:  Cr 0.60, Albumin 4.4,  Hgb 15.8,      IMAGIN2024 CT CAP W/CONTRAST  IMPRESSION:  1.  Stable right lower lobe nodular opacity, likely related to  posttreatment changes. No new disease in the chest, abdomen and  pelvis.     2024 MR BRAIN WO & W/CONTRAST  IMPRESSION:  1.  The dural thickening and enhancement deep to the craniotomy in the deeper patchy enhancement associated with the resection cavity in the orbital right frontal lobe is able to slightly decreased compared to 2024 but mild to moderately decreased   compared to 2024.  2.  The bilobed peripheral enhancement involving the left cerebellar hemisphere appear less cystic and slightly more nodular and thicker compared to 2024. The surrounding FLAIR hyperintensity is slightly increased compared to 2024 but   moderately increased compared to 2024.  3.  The dural thickening and enhancement as well as the deeper patchy enhancement surrounding the resection cavity in the posterior left frontal lobe measuring approximately 1.1 x 1.7 cm is not significantly changed.  4.  No significant change in the approximately 1.1 x 1.4 cm enhancement in the inferior right temporal lobe. The surrounding FLAIR hyperintensity is also not significantly changed.  5.  The other known enhancing intracranial lesions are stable.  6.  No new enhancing intracranial lesions. No  superimposed acute intracranial pathology    25 minutes spent on the date of the encounter doing chart review, history and exam, patient education & counseling, documentation and other activities as noted above.    The longitudinal plan of care for the diagnosis(es)/condition(s) as documented were addressed during this visit. Due to the added complexity in care, I will continue to support Connor in the subsequent management and with ongoing continuity of care.    Ajith Brewer MD MS FAAFP CAQHPM  [facility]  Office [phone]  Fax [phone]       Again, thank you for allowing me to participate in the care of your patient.      Sincerely,    Ajith Brewer MD

## 2025-01-06 ENCOUNTER — MYC REFILL (OUTPATIENT)
Dept: PALLIATIVE CARE | Facility: CLINIC | Age: 45
End: 2025-01-06
Payer: COMMERCIAL

## 2025-01-06 DIAGNOSIS — Z98.890 S/P CRANIOTOMY: ICD-10-CM

## 2025-01-06 DIAGNOSIS — D49.6 BRAIN TUMOR (H): ICD-10-CM

## 2025-01-06 RX ORDER — OXYCODONE HYDROCHLORIDE 10 MG/1
10 TABLET ORAL
Qty: 120 TABLET | Refills: 0 | Status: SHIPPED | OUTPATIENT
Start: 2025-01-10

## 2025-01-06 RX ORDER — AMLODIPINE BESYLATE 10 MG/1
10 TABLET ORAL DAILY
Qty: 90 TABLET | Refills: 3 | Status: SHIPPED | OUTPATIENT
Start: 2025-01-06

## 2025-01-06 RX ORDER — CYCLOBENZAPRINE HCL 5 MG
10 TABLET ORAL
Qty: 30 TABLET | Refills: 5 | Status: SHIPPED | OUTPATIENT
Start: 2025-01-06

## 2025-01-06 NOTE — TELEPHONE ENCOUNTER
"Amlodipine 10mg tab  Last prescribing provider: Chelsea Villegas    Last clinic visit date: 12/30/24 w/ Sarina Navarro    Recommendations for requested medication (if none, N/A): 12/30/24, \"# Severe HTN: on propranolol, lisinopril and amlodipine, previously hydrochlorothiazide was discontinued due to episode of syncope in 07/2024. Reports compliance.   Strongly encouraged daily BP readings and to report to us on Gravyhart\"    Any other pertinent information (if none, N/A): NA    Refilled: Y/N, if NO, why?    "

## 2025-01-06 NOTE — TELEPHONE ENCOUNTER
Received [employer] message from patient requesting refill of oxycodone.     Last refill: 12/27/24  Last office visit: 1/2/25  Scheduled for follow up per check out request.     Will route request to MD/ for review.     Reviewed [place]  Report.

## 2025-01-06 NOTE — TELEPHONE ENCOUNTER
Cyclobenzaprine 5mg tab  Last prescribing provider: Dr. Bassam Persaud    Last clinic visit date: 12/30/24 w/ Sarina Navarro    Recommendations for requested medication (if none, N/A): NA    Any other pertinent information (if none, N/A): NA    Refilled: Y/N, if NO, why?

## 2025-01-13 ENCOUNTER — TELEPHONE (OUTPATIENT)
Dept: ONCOLOGY | Facility: CLINIC | Age: 45
End: 2025-01-13
Payer: COMMERCIAL

## 2025-01-13 DIAGNOSIS — C34.31 MALIGNANT NEOPLASM OF LOWER LOBE OF RIGHT LUNG (H): Primary | ICD-10-CM
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
## 2025-01-13 DIAGNOSIS — I67.89 RADIATION THERAPY INDUCED BRAIN NECROSIS: ICD-10-CM

## 2025-01-13 NOTE — ORAL ONC MGMT
Oral Chemotherapy Monitoring Program     Placed call to patient in follow up of oral chemotherapy for monthly assessment. Left message requesting call back. No drug names were mentioned. Will update when response received.     Celso Nichols, PharmD  Hematology/Oncology Clinical Pharmacist  [facility]  [facility]
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
## 2025-01-16 DIAGNOSIS — C34.31 MALIGNANT NEOPLASM OF LOWER LOBE OF RIGHT LUNG (H): Primary | ICD-10-CM

## 2025-01-16 DIAGNOSIS — I67.89 RADIATION THERAPY INDUCED BRAIN NECROSIS: ICD-10-CM

## 2025-01-22 ENCOUNTER — MYC REFILL (OUTPATIENT)
Dept: PALLIATIVE CARE | Facility: CLINIC | Age: 45
End: 2025-01-22
Payer: COMMERCIAL

## 2025-01-22 ENCOUNTER — TELEPHONE (OUTPATIENT)
Dept: ONCOLOGY | Facility: CLINIC | Age: 45
End: 2025-01-22
Payer: COMMERCIAL

## 2025-01-22 DIAGNOSIS — D49.6 BRAIN TUMOR (H): ICD-10-CM

## 2025-01-22 DIAGNOSIS — Z98.890 S/P CRANIOTOMY: ICD-10-CM

## 2025-01-22 NOTE — TELEPHONE ENCOUNTER
FREE DRUG APPLICATION INITIATED    Medication: LORBRENA 25 MG PO TABS  Free Drug Program Name:     Date Submitted: 1/22/2025  1:49 PM              Juliette Marquez CPhT  [facility]  Oncology Pharmacy Liaison II  [email or web address]  [phone] (phone  [phone] (fax

## 2025-01-23 RX ORDER — OXYCODONE HYDROCHLORIDE 10 MG/1
10 TABLET ORAL
Qty: 120 TABLET | Refills: 0 | Status: SHIPPED | OUTPATIENT
Start: 2025-01-24

## 2025-01-23 NOTE — TELEPHONE ENCOUNTER
Received LoyalBlockst message from patient requesting refill of oxycodone.     Last refill: 1/10/25  Last office visit: 1/2/25  Scheduled for follow up pending, msg sent to scheduling.     Will route request to MD/ for review.     Reviewed [place]  Report.

## 2025-01-27 NOTE — TELEPHONE ENCOUNTER
Ondina Najera :  boxes (that are not visible on the application) needed to be checked          Juliette Marquez CPhT  [facility]  Oncology Pharmacy Liaison II  [email or web address]  [phone] (phone  [phone] (fax

## 2025-01-29 NOTE — TELEPHONE ENCOUNTER
[employer]  Purnima:  asked for this to be expedited as he is out of med (two weeks).      Juliette Marquez [facility]at[facility]  Oncology Pharmacy Liaison II  [email or web address]  [phone] (phone  [phone] (fax

## 2025-01-31 ENCOUNTER — TELEPHONE (OUTPATIENT)
Dept: ONCOLOGY | Facility: CLINIC | Age: 45
End: 2025-01-31
Payer: COMMERCIAL

## 2025-02-01 ENCOUNTER — MYC REFILL (OUTPATIENT)
Dept: PALLIATIVE CARE | Facility: CLINIC | Age: 45
End: 2025-02-01
Payer: COMMERCIAL

## 2025-02-01 DIAGNOSIS — Z98.890 S/P CRANIOTOMY: ICD-10-CM

## 2025-02-01 DIAGNOSIS — D49.6 BRAIN TUMOR (H): ICD-10-CM

## 2025-02-03 ENCOUNTER — MYC REFILL (OUTPATIENT)
Dept: FAMILY MEDICINE | Facility: CLINIC | Age: 45
End: 2025-02-03
Payer: COMMERCIAL

## 2025-02-03 RX ORDER — METHADONE HYDROCHLORIDE 10 MG/1
TABLET ORAL
OUTPATIENT
Start: 2025-02-03

## 2025-02-03 RX ORDER — OXYCODONE HYDROCHLORIDE 10 MG/1
10 TABLET ORAL
Qty: 120 TABLET | Refills: 0 | Status: SHIPPED | OUTPATIENT
Start: 2025-02-07

## 2025-02-03 NOTE — TELEPHONE ENCOUNTER
Received OD[employer]t message from patient requesting refill of oxycodone.     Last refill: 1/24/25  Last office visit: 1/2/25  Scheduled for follow up 3/5/25     Will route request to MD/ for review.     Reviewed [place]  Report.

## 2025-02-11 DIAGNOSIS — C78.01 MALIGNANT NEOPLASM METASTATIC TO BOTH LUNGS (H): Primary | ICD-10-CM

## 2025-02-11 DIAGNOSIS — C78.02 MALIGNANT NEOPLASM METASTATIC TO BOTH LUNGS (H): Primary | ICD-10-CM

## 2025-02-13 ENCOUNTER — PATIENT OUTREACH (OUTPATIENT)
Dept: CARE COORDINATION | Facility: CLINIC | Age: 45
End: 2025-02-13
Payer: COMMERCIAL

## 2025-02-13 NOTE — PROGRESS NOTES
Social Work - Intervention  [facility]  Data/Intervention:  Metastatic NSCLC follows with Dr. Persaud.   Patient Name: Connor Emerson Goes By: Connor WHITEHEAD/Age: 1980 (44 year old)     Visit Type: telephone    Collaborated With:    -[employer] - sent in medical verification portion.      Psychosocial Information/Concerns:  Financial stress/needing support with medical verification form. Patient also working on [employer].     Intervention/Education/Resources Provided:  PEO fund  [employer]      Assessment/Plan:  Connor working on completion of grants. VELIA assisting with medical verification. No other needs identified today.     Provided patient/family with contact information and availability.    LISETH Randall, [facility] Center[facility]   Adult Oncology - [place]  [phone]  [place] [place] on    *Please note does not work on .   Support Groups at [facility]: Social Work Services for Cancer Patients ([email or web address])

## 2025-02-17 ENCOUNTER — MYC REFILL (OUTPATIENT)
Dept: PALLIATIVE CARE | Facility: CLINIC | Age: 45
End: 2025-02-17

## 2025-02-17 DIAGNOSIS — Z98.890 S/P CRANIOTOMY: ICD-10-CM

## 2025-02-17 DIAGNOSIS — D49.6 BRAIN TUMOR (H): ICD-10-CM

## 2025-02-18 RX ORDER — OXYCODONE HYDROCHLORIDE 10 MG/1
10 TABLET ORAL
Qty: 120 TABLET | Refills: 0 | Status: SHIPPED | OUTPATIENT
Start: 2025-02-21

## 2025-02-18 NOTE — TELEPHONE ENCOUNTER
Received Captorat message from patient requesting refill of oxycodone.     Last refill: 2/7/25  Last office visit: 1/2/25  Scheduled for follow up 3/5/25     Will route request to MD/ for review.     Reviewed [place]  Report.

## 2025-03-05 ENCOUNTER — TELEPHONE (OUTPATIENT)
Dept: PALLIATIVE CARE | Facility: CLINIC | Age: 45
End: 2025-03-05

## 2025-03-05 ENCOUNTER — PATIENT OUTREACH (OUTPATIENT)
Dept: CARE COORDINATION | Facility: CLINIC | Age: 45
End: 2025-03-05

## 2025-03-05 NOTE — PROGRESS NOTES
Social Work - Telephone/MyChart message  [facility]  Patient Name: Connor Emerson  Goes By: Connor    /Age: 1980 (44 year old)      Check in/ about grants. Offered [employer], and provided information on [facility] and [facility] navigators.  Intervention: Sent patient MyChart message on 3/5/2025 .   Plan:  will await patient's return phone call/message and provide assistance at that time.   LISETH Randall, [facility]   Adult Oncology - [place]  [phone]  [place] [place] on    *Please note does not work on .   Support Groups at [facility]: Social Work Services for Cancer Patients ([email or web address])

## 2025-03-05 NOTE — TELEPHONE ENCOUNTER
Patient needs to be rescheduled for their virtual visit due to Reason for Reschedule: Patient Request    Scheduling team, please refer to service line late cancellation/no-show policies and reach out to patient at a later date for rescheduling.    Appointment mode: Video  Provider: Dr. Brewer

## 2025-03-06 ENCOUNTER — PATIENT OUTREACH (OUTPATIENT)
Dept: ONCOLOGY | Facility: CLINIC | Age: 45
End: 2025-03-06

## 2025-03-06 NOTE — PROGRESS NOTES
Spoke with Pt Re: recently missed appts. Pt states he does not currently have insurance and is working with SW to obtain again. I verbalized sympathy for the situation, will endeavor to reschedule when insurance is reinstated.

## 2025-03-11 ENCOUNTER — PATIENT OUTREACH (OUTPATIENT)
Dept: CARE COORDINATION | Facility: CLINIC | Age: 45
End: 2025-03-11

## 2025-03-11 NOTE — PROGRESS NOTES
Social Work - Telephone/meQuilibrium message  [facility]  Patient Name: Connor Emerson  Goes By: Connor    /Age: 1980 (44 year old)      SW attempted to call Connor x2 but the phone just was busy. SW sent message via Every[employer].   -Check in/offer financial worker and marshall care/financial assistance with [place]. Snail mailed the form. Also send medical verification for [facility] fund as he has been working on the wally.     Intervention: Sent patient [employer]hart message on 3/11/2025 .   Plan:  will await patient's return phone call/message and provide assistance at that time.    LISETH Randall, [facility]   Adult Oncology - [place]  [phone]  [place] [place] on    *Please note does not work on .   Support Groups at [facility]: Social Work Services for Cancer Patients ([email or web address])

## 2025-03-12 ENCOUNTER — PATIENT OUTREACH (OUTPATIENT)
Dept: ONCOLOGY | Facility: CLINIC | Age: 45
End: 2025-03-12

## 2025-03-12 NOTE — PROGRESS NOTES
[facility]: Cancer Care                                                                                          LDM for both Connor and Kylie to confirm Connor's appt for 3/13 with Dr. Billingsley.  Per his chart, no insurance listed.  Checking on status of insurance to see if they need further assistance to establish adequate insurance.   No answer, left my direct number to call back.     Melani Salcido RN

## 2025-03-20 DIAGNOSIS — Z98.890 S/P CRANIOTOMY: ICD-10-CM

## 2025-03-20 DIAGNOSIS — D49.6 BRAIN TUMOR (H): ICD-10-CM

## 2025-03-20 RX ORDER — OXYCODONE HYDROCHLORIDE 10 MG/1
10 TABLET ORAL
Qty: 120 TABLET | Refills: 0 | Status: SHIPPED | OUTPATIENT
Start: 2025-03-20

## 2025-03-20 NOTE — TELEPHONE ENCOUNTER
Received V[employer] Anal[employer]st message from patient requesting refill of oxycodone.     Last refill: 3/7/25  Last office visit: 1/2/25  Scheduled for follow up pending, waiting for insurance coverage.     Will route request to MD/DO for review.     Reviewed [place]  Report.

## 2025-03-24 ENCOUNTER — PATIENT OUTREACH (OUTPATIENT)
Dept: ONCOLOGY | Facility: CLINIC | Age: 45
End: 2025-03-24

## 2025-03-24 DIAGNOSIS — C79.31 MALIGNANT NEOPLASM METASTATIC TO BRAIN (H): ICD-10-CM

## 2025-03-24 DIAGNOSIS — C34.90 NON-SMALL CELL LUNG CANCER, UNSPECIFIED LATERALITY (H): ICD-10-CM

## 2025-03-24 DIAGNOSIS — G89.3 CANCER ASSOCIATED PAIN: ICD-10-CM

## 2025-03-24 RX ORDER — METHADONE HYDROCHLORIDE 5 MG/1
5 TABLET ORAL 2 TIMES DAILY
Qty: 60 TABLET | Refills: 0 | Status: SHIPPED | OUTPATIENT
Start: 2025-03-24
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
## 2025-03-25 NOTE — PROGRESS NOTES
Called Pt's SO to get her perspective on increased seizure activity and urge that the Pt should go to ED. No answer, left detailed message on machine to call back

## 2025-03-27 ENCOUNTER — PATIENT OUTREACH (OUTPATIENT)
Dept: CARE COORDINATION | Facility: CLINIC | Age: 45
End: 2025-03-27

## 2025-03-27 NOTE — PROGRESS NOTES
Social Work - Follow-Up  [facility]    Data/Intervention:  Metastatic NSCLC follows with Dr. Persaud.   Patient Name: Connor Emerson Goes By: Connor    VENTURA/Age: 1980 (44 year old)    Reason for Follow-Up:  Check in follow up on resources provided     Intervention/Education/Resources Provided:  MA still pending- working with [facility]  Waiting on SSDI application. Has Cancer Legal Care to connect with if needed.     Emotional support offered;     Assessment/Plan:  Connor had no questions or concerns today for .    Previously provided patient/family with writer's contact information and availability.    LISETH Randall, LICSW   Adult Oncology - [place]  [phone]  [place] [place] on    *Please note does not work on .   Support Groups at [facility]: Social Work Services for Cancer Patients ([email or web address])

## 2025-03-31 ENCOUNTER — MYC REFILL (OUTPATIENT)
Dept: PALLIATIVE CARE | Facility: CLINIC | Age: 45
End: 2025-03-31

## 2025-03-31 DIAGNOSIS — Z98.890 S/P CRANIOTOMY: ICD-10-CM

## 2025-03-31 DIAGNOSIS — D49.6 BRAIN TUMOR (H): ICD-10-CM

## 2025-04-01 RX ORDER — OXYCODONE HYDROCHLORIDE 10 MG/1
10 TABLET ORAL
Qty: 120 TABLET | Refills: 0 | Status: SHIPPED | OUTPATIENT
Start: 2025-04-03

## 2025-04-01 NOTE — TELEPHONE ENCOUNTER
Received KONUXt message from patient requesting refill of oxycodone.     Last refill: 3/21/2025  Last office visit: 1/2/2025  Scheduled for follow up pending per check out request      Will route request to MD/ for review.     Reviewed [place]  Report.

## 2025-04-02 NOTE — PLAN OF CARE
Goal Outcome Evaluation:  [id number]     Patient is A&O x4. Call light within reach. Able to make needs known effectively. Independent in the room. Voids spontaneously without difficulty. LBM: 05/19.     RA. Moderate consistent Carb(60g) diet. Carb coverage with meal. PIV on R hand, infusing with continuous insulin drip 1unit/1ml ADULT IV infusion with NS at 10ml/hr, Algorithm 1. Denies pain, chest pain, SOB, n/v and n/t.      RN managed of K(5.1), Phos(2.4) replaced with Oral K & Na phosphates and Mg(1.8). AM lab draws placed.     Your current Orthopaedic problem we are working together to treat is pain.    We are glad you had relief with the trial, please review the following:    Do not shower for 24hrs  Do not submerge in water for 72hrs     For permanent SCS placement:  Shikha, our , has worked with you to set up your procedure. Her direct phone number is [phone].     Hold IBUPROFEN for 24 hours prior to procedure and 24 hours following.       You will need to schedule the following:  Pre-op clearance with your Primary Care Physician    Today we collected the MRSA swab at your Nurse Visit:    If your swab comes back positive for Staph/MSSA, we will call in Bactroban 2% external nasal ointment (22 gram tube).     Apply to bilateral nares (nostrils) twice daily for 5 days before procedure, day of procedure, and 5 days after procedure.        Please  the following medications prior to your procedure so that you have them at home. These are only to be used AFTER your procedure:     Hydrocodone-Acetaminophen (Norco) 5-325 mg: Take 1-2 tablets per day as needed for pain x 1 week post procedure.    Cephalexin (Keflex) 500 mg (Antibiotic): Take 2 capsules the evening after your procedure (same day). Then take 2 capsules in the morning (the day after your procedure).     Once the permanent SCS is placed:  Activities at a minimum for 72 hrs following placement     No sudden movements    Your stimulator will be turned on at your follow-up appointment 1 week after placement.     For 6 weeks:  Do not raise your arms over your head or move your shoulders beyond 90°  Do not bend, twist or stretch your back in any way  Do not lift more than 5 pounds     Do not sit in a bathtub, swim, shower or immerse yourself in water until your first office visit. Sponge baths are OK.       Do not remove your bandages from your wounds until your first follow up office visit.     Follow up at your next scheduled clinic appointment.      You can take  your pain medicines as usual but do not take Aspirin, Ibuprofen, Motrin, or any other anti-inflammatory medications until 24 hours after the procedure was completed.     If you have any of the following symptoms, proceed to the emergency room: fever, chills, sweating, new weakness or numbness, loss of control of bowel or bladder, unbearable pain, redness, swelling, foul smell, fluid discharge, or bleeding at the wound.      Office hours are 8:00 am to 5:00 pm Monday through Friday. If it is urgent that you speak with someone outside of these hours, our [facility] Call Center will be able to assist you.     If you have any questions or concerns prior to your next office visit, please call our Pain Line: [phone].      Thank you for choosing [facility]!

## 2025-04-15 ENCOUNTER — MYC REFILL (OUTPATIENT)
Dept: PALLIATIVE CARE | Facility: CLINIC | Age: 45
End: 2025-04-15

## 2025-04-15 DIAGNOSIS — D49.6 BRAIN TUMOR (H): ICD-10-CM

## 2025-04-15 DIAGNOSIS — Z98.890 S/P CRANIOTOMY: ICD-10-CM

## 2025-04-15 RX ORDER — OXYCODONE HYDROCHLORIDE 10 MG/1
10 TABLET ORAL
Qty: 120 TABLET | Refills: 0 | Status: SHIPPED | OUTPATIENT
Start: 2025-04-17

## 2025-04-15 NOTE — TELEPHONE ENCOUNTER
Received Luv Rinkt message from patient requesting refill of oxycodone.     Last refill: 4/3/25  Last office visit: 1/2/25  Scheduled for follow up pending, waiting for insurance coverage.    Will route request to MD/DO for review.     Reviewed [place]  Report.

## 2025-04-19 ENCOUNTER — HEALTH MAINTENANCE LETTER (OUTPATIENT)
Age: 45
End: 2025-04-19

## 2025-04-21 ENCOUNTER — MYC MEDICAL ADVICE (OUTPATIENT)
Dept: ONCOLOGY | Facility: CLINIC | Age: 45
End: 2025-04-21

## 2025-04-22 NOTE — CONFIDENTIAL NOTE
Oncology Nurse Triage - Reporting Symptoms    Situation:   Connor reporting the following symptoms:seizure       Background:   Treating Provider:   was a DR Persaud patient is unsure of wh his new oncologist is has no appointments scheduled     Date of last office visit: 12/30/24 Sarina Crosshardt     Recent treatments: Yes: lorlatinib       Assessment  Onset of symptoms: hand went cold all on right side.   Had one yesterday afternoon where heart started racing , did not feel like himself and his hand makes a fist and it gets cold . One time yesterday it was ice cold. Continues to take Keppra  Is only taking 1000mg tabs currently, is not taking the 500mg tabs to equal 1500 mg tab twice daily. Is only taking 1000mg twice daily.    Encouraged per care team previous messages that he go into the ER with any seizure activity so he can be evaluated.    He will try taking the correct dosage of keppra 1500mg and will call back if he needs refills..           Recommendations:

## 2025-04-29 ENCOUNTER — MYC REFILL (OUTPATIENT)
Dept: PALLIATIVE CARE | Facility: CLINIC | Age: 45
End: 2025-04-29

## 2025-04-29 DIAGNOSIS — Z98.890 S/P CRANIOTOMY: ICD-10-CM

## 2025-04-29 DIAGNOSIS — D49.6 BRAIN TUMOR (H): ICD-10-CM

## 2025-04-29 RX ORDER — OXYCODONE HYDROCHLORIDE 10 MG/1
10 TABLET ORAL
Qty: 120 TABLET | Refills: 0 | Status: SHIPPED | OUTPATIENT
Start: 2025-05-01

## 2025-04-29 NOTE — TELEPHONE ENCOUNTER
Received [employer] message from patient requesting refill of oxycodone.     Last refill: 4/17/25  Last office visit: 1/2/25  Scheduled for follow up pending, waiting for insurance coverage    Will route request to MD/DO for review.     Reviewed [place]  Report.

## 2025-05-01 ENCOUNTER — HOSPITAL ENCOUNTER (INPATIENT)
Facility: CLINIC | Age: 45
DRG: 100 | End: 2025-05-01
Attending: EMERGENCY MEDICINE | Admitting: STUDENT IN AN ORGANIZED HEALTH CARE EDUCATION/TRAINING PROGRAM

## 2025-05-01 VITALS
RESPIRATION RATE: 18 BRPM | OXYGEN SATURATION: 96 % | WEIGHT: 220 LBS | SYSTOLIC BLOOD PRESSURE: 131 MMHG | HEART RATE: 94 BPM | BODY MASS INDEX: 32.49 KG/M2 | DIASTOLIC BLOOD PRESSURE: 89 MMHG | TEMPERATURE: 98.2 F

## 2025-05-01 DIAGNOSIS — R73.9 HYPERGLYCEMIA: ICD-10-CM

## 2025-05-01 DIAGNOSIS — E11.9 TYPE 2 DIABETES MELLITUS WITHOUT COMPLICATION, WITH LONG-TERM CURRENT USE OF INSULIN (H): ICD-10-CM

## 2025-05-01 DIAGNOSIS — R56.9 SEIZURE (H): ICD-10-CM

## 2025-05-01 DIAGNOSIS — Z79.4 TYPE 2 DIABETES MELLITUS WITHOUT COMPLICATION, WITH LONG-TERM CURRENT USE OF INSULIN (H): ICD-10-CM

## 2025-05-01 LAB
ALBUMIN SERPL BCG-MCNC: 4 G/DL (ref 3.5–5.2)
ALBUMIN SERPL BCG-MCNC: 4.2 G/DL (ref 3.5–5.2)
ALP SERPL-CCNC: 146 U/L (ref 40–150)
ALP SERPL-CCNC: 173 U/L (ref 40–150)
ALT SERPL W P-5'-P-CCNC: 21 U/L (ref 0–70)
ALT SERPL W P-5'-P-CCNC: 23 U/L (ref 0–70)
ANION GAP SERPL CALCULATED.3IONS-SCNC: 10 MMOL/L (ref 7–15)
ANION GAP SERPL CALCULATED.3IONS-SCNC: 11 MMOL/L (ref 7–15)
ANION GAP SERPL CALCULATED.3IONS-SCNC: 12 MMOL/L (ref 7–15)
ANION GAP SERPL CALCULATED.3IONS-SCNC: 13 MMOL/L (ref 7–15)
ANION GAP SERPL CALCULATED.3IONS-SCNC: 14 MMOL/L (ref 7–15)
AST SERPL W P-5'-P-CCNC: 16 U/L (ref 0–45)
AST SERPL W P-5'-P-CCNC: 20 U/L (ref 0–45)
ATRIAL RATE - MUSE: 93 BPM
ATRIAL RATE - MUSE: 99 BPM
B-OH-BUTYR SERPL-SCNC: 0.28 MMOL/L
B-OH-BUTYR SERPL-SCNC: 0.46 MMOL/L
B-OH-BUTYR SERPL-SCNC: <0.18 MMOL/L
B-OH-BUTYR SERPL-SCNC: <0.18 MMOL/L
BASE EXCESS BLDV CALC-SCNC: 2.8 MMOL/L (ref -3–3)
BASE EXCESS BLDV CALC-SCNC: 4.8 MMOL/L (ref -3–3)
BASOPHILS # BLD AUTO: 0.1 10E3/UL (ref 0–0.2)
BASOPHILS # BLD AUTO: 0.1 10E3/UL (ref 0–0.2)
BASOPHILS NFR BLD AUTO: 1 %
BASOPHILS NFR BLD AUTO: 1 %
BILIRUB DIRECT SERPL-MCNC: 0.09 MG/DL (ref 0–0.3)
BILIRUB SERPL-MCNC: 0.2 MG/DL
BILIRUB SERPL-MCNC: 0.4 MG/DL
BUN SERPL-MCNC: 10.8 MG/DL (ref 6–20)
BUN SERPL-MCNC: 11.6 MG/DL (ref 6–20)
BUN SERPL-MCNC: 11.7 MG/DL (ref 6–20)
BUN SERPL-MCNC: 14 MG/DL (ref 6–20)
BUN SERPL-MCNC: 14.3 MG/DL (ref 6–20)
CALCIUM SERPL-MCNC: 9.2 MG/DL (ref 8.8–10.4)
CALCIUM SERPL-MCNC: 9.2 MG/DL (ref 8.8–10.4)
CALCIUM SERPL-MCNC: 9.4 MG/DL (ref 8.8–10.4)
CALCIUM SERPL-MCNC: 9.4 MG/DL (ref 8.8–10.4)
CALCIUM SERPL-MCNC: 9.5 MG/DL (ref 8.8–10.4)
CHLORIDE SERPL-SCNC: 103 MMOL/L (ref 98–107)
CHLORIDE SERPL-SCNC: 103 MMOL/L (ref 98–107)
CHLORIDE SERPL-SCNC: 105 MMOL/L (ref 98–107)
CHLORIDE SERPL-SCNC: 105 MMOL/L (ref 98–107)
CHLORIDE SERPL-SCNC: 94 MMOL/L (ref 98–107)
CREAT SERPL-MCNC: 0.52 MG/DL (ref 0.67–1.17)
CREAT SERPL-MCNC: 0.53 MG/DL (ref 0.67–1.17)
CREAT SERPL-MCNC: 0.55 MG/DL (ref 0.67–1.17)
CREAT SERPL-MCNC: 0.58 MG/DL (ref 0.67–1.17)
CREAT SERPL-MCNC: 0.73 MG/DL (ref 0.67–1.17)
DIASTOLIC BLOOD PRESSURE - MUSE: NORMAL MMHG
DIASTOLIC BLOOD PRESSURE - MUSE: NORMAL MMHG
EGFRCR SERPLBLD CKD-EPI 2021: >90 ML/MIN/1.73M2
EOSINOPHIL # BLD AUTO: 0.3 10E3/UL (ref 0–0.7)
EOSINOPHIL # BLD AUTO: 0.3 10E3/UL (ref 0–0.7)
EOSINOPHIL NFR BLD AUTO: 3 %
EOSINOPHIL NFR BLD AUTO: 4 %
ERYTHROCYTE [DISTWIDTH] IN BLOOD BY AUTOMATED COUNT: 12 % (ref 10–15)
ERYTHROCYTE [DISTWIDTH] IN BLOOD BY AUTOMATED COUNT: 12.1 % (ref 10–15)
EST. AVERAGE GLUCOSE BLD GHB EST-MCNC: 392 MG/DL
GLUCOSE BLD-MCNC: 717 MG/DL (ref 70–99)
GLUCOSE BLDC GLUCOMTR-MCNC: 172 MG/DL (ref 70–99)
GLUCOSE BLDC GLUCOMTR-MCNC: 217 MG/DL (ref 70–99)
GLUCOSE BLDC GLUCOMTR-MCNC: 236 MG/DL (ref 70–99)
GLUCOSE BLDC GLUCOMTR-MCNC: 262 MG/DL (ref 70–99)
GLUCOSE BLDC GLUCOMTR-MCNC: 284 MG/DL (ref 70–99)
GLUCOSE BLDC GLUCOMTR-MCNC: 286 MG/DL (ref 70–99)
GLUCOSE BLDC GLUCOMTR-MCNC: 296 MG/DL (ref 70–99)
GLUCOSE BLDC GLUCOMTR-MCNC: 318 MG/DL (ref 70–99)
GLUCOSE BLDC GLUCOMTR-MCNC: 387 MG/DL (ref 70–99)
GLUCOSE BLDC GLUCOMTR-MCNC: 411 MG/DL (ref 70–99)
GLUCOSE BLDC GLUCOMTR-MCNC: 477 MG/DL (ref 70–99)
GLUCOSE BLDC GLUCOMTR-MCNC: >600 MG/DL (ref 70–99)
GLUCOSE BLDC GLUCOMTR-MCNC: >600 MG/DL (ref 70–99)
GLUCOSE SERPL-MCNC: 146 MG/DL (ref 70–99)
GLUCOSE SERPL-MCNC: 239 MG/DL (ref 70–99)
GLUCOSE SERPL-MCNC: 267 MG/DL (ref 70–99)
GLUCOSE SERPL-MCNC: 482 MG/DL (ref 70–99)
GLUCOSE SERPL-MCNC: 758 MG/DL (ref 70–99)
HBA1C MFR BLD: 15.3 %
HCO3 BLDV-SCNC: 30 MMOL/L (ref 21–28)
HCO3 BLDV-SCNC: 31 MMOL/L (ref 21–28)
HCO3 SERPL-SCNC: 23 MMOL/L (ref 22–29)
HCO3 SERPL-SCNC: 25 MMOL/L (ref 22–29)
HCO3 SERPL-SCNC: 27 MMOL/L (ref 22–29)
HCT VFR BLD AUTO: 45.3 % (ref 40–53)
HCT VFR BLD AUTO: 47.4 % (ref 40–53)
HGB BLD-MCNC: 15.7 G/DL (ref 13.3–17.7)
HGB BLD-MCNC: 16.4 G/DL (ref 13.3–17.7)
IMM GRANULOCYTES # BLD: 0 10E3/UL
IMM GRANULOCYTES # BLD: 0.1 10E3/UL
IMM GRANULOCYTES NFR BLD: 0 %
IMM GRANULOCYTES NFR BLD: 1 %
INTERPRETATION ECG - MUSE: NORMAL
INTERPRETATION ECG - MUSE: NORMAL
LEVETIRACETAM SERPL-MCNC: <2 ÂΜG/ML (ref 10–40)
LYMPHOCYTES # BLD AUTO: 2.2 10E3/UL (ref 0.8–5.3)
LYMPHOCYTES # BLD AUTO: 2.5 10E3/UL (ref 0.8–5.3)
LYMPHOCYTES NFR BLD AUTO: 26 %
LYMPHOCYTES NFR BLD AUTO: 26 %
MAGNESIUM SERPL-MCNC: 1.8 MG/DL (ref 1.7–2.3)
MCH RBC QN AUTO: 30.5 PG (ref 26.5–33)
MCH RBC QN AUTO: 30.6 PG (ref 26.5–33)
MCHC RBC AUTO-ENTMCNC: 34.6 G/DL (ref 31.5–36.5)
MCHC RBC AUTO-ENTMCNC: 34.7 G/DL (ref 31.5–36.5)
MCV RBC AUTO: 88 FL (ref 78–100)
MCV RBC AUTO: 88 FL (ref 78–100)
MONOCYTES # BLD AUTO: 0.6 10E3/UL (ref 0–1.3)
MONOCYTES # BLD AUTO: 0.6 10E3/UL (ref 0–1.3)
MONOCYTES NFR BLD AUTO: 6 %
MONOCYTES NFR BLD AUTO: 7 %
NEUTROPHILS # BLD AUTO: 5.3 10E3/UL (ref 1.6–8.3)
NEUTROPHILS # BLD AUTO: 6.1 10E3/UL (ref 1.6–8.3)
NEUTROPHILS NFR BLD AUTO: 63 %
NEUTROPHILS NFR BLD AUTO: 64 %
NRBC # BLD AUTO: 0 10E3/UL
NRBC # BLD AUTO: 0 10E3/UL
NRBC BLD AUTO-RTO: 0 /100
NRBC BLD AUTO-RTO: 0 /100
O2/TOTAL GAS SETTING VFR VENT: 21 %
O2/TOTAL GAS SETTING VFR VENT: 21 %
OSMOLALITY SERPL: 311 MMOL/KG (ref 275–295)
OSMOLALITY SERPL: 312 MMOL/KG (ref 275–295)
OXYHGB MFR BLDV: 42 % (ref 70–75)
OXYHGB MFR BLDV: 77 % (ref 70–75)
P AXIS - MUSE: 33 DEGREES
P AXIS - MUSE: 40 DEGREES
PCO2 BLDV: 48 MM HG (ref 40–50)
PCO2 BLDV: 54 MM HG (ref 40–50)
PH BLDV: 7.35 [PH] (ref 7.32–7.43)
PH BLDV: 7.41 [PH] (ref 7.32–7.43)
PHOSPHATE SERPL-MCNC: 2.6 MG/DL (ref 2.5–4.5)
PHOSPHATE SERPL-MCNC: 2.8 MG/DL (ref 2.5–4.5)
PHOSPHATE SERPL-MCNC: 3 MG/DL (ref 2.5–4.5)
PLATELET # BLD AUTO: 203 10E3/UL (ref 150–450)
PLATELET # BLD AUTO: 203 10E3/UL (ref 150–450)
PO2 BLDV: 26 MM HG (ref 25–47)
PO2 BLDV: 44 MM HG (ref 25–47)
POTASSIUM SERPL-SCNC: 3.4 MMOL/L (ref 3.4–5.3)
POTASSIUM SERPL-SCNC: 3.7 MMOL/L (ref 3.4–5.3)
POTASSIUM SERPL-SCNC: 3.9 MMOL/L (ref 3.4–5.3)
PR INTERVAL - MUSE: 144 MS
PR INTERVAL - MUSE: 146 MS
PROT SERPL-MCNC: 6.2 G/DL (ref 6.4–8.3)
PROT SERPL-MCNC: 6.7 G/DL (ref 6.4–8.3)
QRS DURATION - MUSE: 80 MS
QRS DURATION - MUSE: 82 MS
QT - MUSE: 362 MS
QT - MUSE: 372 MS
QTC - MUSE: 450 MS
QTC - MUSE: 477 MS
R AXIS - MUSE: 12 DEGREES
R AXIS - MUSE: 24 DEGREES
RBC # BLD AUTO: 5.15 10E6/UL (ref 4.4–5.9)
RBC # BLD AUTO: 5.36 10E6/UL (ref 4.4–5.9)
SAO2 % BLDV: 42.3 % (ref 70–75)
SAO2 % BLDV: 78.2 % (ref 70–75)
SODIUM SERPL-SCNC: 131 MMOL/L (ref 135–145)
SODIUM SERPL-SCNC: 140 MMOL/L (ref 135–145)
SODIUM SERPL-SCNC: 141 MMOL/L (ref 135–145)
SODIUM SERPL-SCNC: 142 MMOL/L (ref 135–145)
SODIUM SERPL-SCNC: 145 MMOL/L (ref 135–145)
SYSTOLIC BLOOD PRESSURE - MUSE: NORMAL MMHG
SYSTOLIC BLOOD PRESSURE - MUSE: NORMAL MMHG
T AXIS - MUSE: 44 DEGREES
T AXIS - MUSE: 49 DEGREES
TROPONIN T SERPL HS-MCNC: 8 NG/L
TROPONIN T SERPL HS-MCNC: 9 NG/L
VENTRICULAR RATE- MUSE: 93 BPM
VENTRICULAR RATE- MUSE: 99 BPM
WBC # BLD AUTO: 8.4 10E3/UL (ref 4–11)
WBC # BLD AUTO: 9.6 10E3/UL (ref 4–11)

## 2025-05-01 PROCEDURE — 82962 GLUCOSE BLOOD TEST: CPT

## 2025-05-01 PROCEDURE — 82805 BLOOD GASES W/O2 SATURATION: CPT | Performed by: PEDIATRICS

## 2025-05-01 PROCEDURE — 250N000013 HC RX MED GY IP 250 OP 250 PS 637: Performed by: STUDENT IN AN ORGANIZED HEALTH CARE EDUCATION/TRAINING PROGRAM

## 2025-05-01 PROCEDURE — 85004 AUTOMATED DIFF WBC COUNT: CPT | Performed by: STUDENT IN AN ORGANIZED HEALTH CARE EDUCATION/TRAINING PROGRAM

## 2025-05-01 PROCEDURE — 80048 BASIC METABOLIC PNL TOTAL CA: CPT | Performed by: PEDIATRICS

## 2025-05-01 PROCEDURE — 250N000011 HC RX IP 250 OP 636: Performed by: EMERGENCY MEDICINE

## 2025-05-01 PROCEDURE — 83036 HEMOGLOBIN GLYCOSYLATED A1C: CPT | Performed by: STUDENT IN AN ORGANIZED HEALTH CARE EDUCATION/TRAINING PROGRAM

## 2025-05-01 PROCEDURE — 83930 ASSAY OF BLOOD OSMOLALITY: CPT | Performed by: PEDIATRICS

## 2025-05-01 PROCEDURE — 36415 COLL VENOUS BLD VENIPUNCTURE: CPT | Performed by: STUDENT IN AN ORGANIZED HEALTH CARE EDUCATION/TRAINING PROGRAM

## 2025-05-01 PROCEDURE — 250N000009 HC RX 250: Performed by: EMERGENCY MEDICINE

## 2025-05-01 PROCEDURE — 84484 ASSAY OF TROPONIN QUANT: CPT | Performed by: EMERGENCY MEDICINE

## 2025-05-01 PROCEDURE — 82248 BILIRUBIN DIRECT: CPT | Performed by: EMERGENCY MEDICINE

## 2025-05-01 PROCEDURE — 36415 COLL VENOUS BLD VENIPUNCTURE: CPT | Performed by: PEDIATRICS

## 2025-05-01 PROCEDURE — 85025 COMPLETE CBC W/AUTO DIFF WBC: CPT | Performed by: EMERGENCY MEDICINE

## 2025-05-01 PROCEDURE — 99223 1ST HOSP IP/OBS HIGH 75: CPT | Mod: GC | Performed by: PSYCHIATRY & NEUROLOGY

## 2025-05-01 PROCEDURE — 120N000002 HC R&B MED SURG/OB UMMC

## 2025-05-01 PROCEDURE — 82248 BILIRUBIN DIRECT: CPT | Performed by: STUDENT IN AN ORGANIZED HEALTH CARE EDUCATION/TRAINING PROGRAM

## 2025-05-01 PROCEDURE — 99223 1ST HOSP IP/OBS HIGH 75: CPT | Mod: AI | Performed by: PEDIATRICS

## 2025-05-01 PROCEDURE — 80048 BASIC METABOLIC PNL TOTAL CA: CPT | Performed by: STUDENT IN AN ORGANIZED HEALTH CARE EDUCATION/TRAINING PROGRAM

## 2025-05-01 PROCEDURE — 82010 KETONE BODYS QUAN: CPT | Performed by: STUDENT IN AN ORGANIZED HEALTH CARE EDUCATION/TRAINING PROGRAM

## 2025-05-01 PROCEDURE — 83735 ASSAY OF MAGNESIUM: CPT | Performed by: STUDENT IN AN ORGANIZED HEALTH CARE EDUCATION/TRAINING PROGRAM

## 2025-05-01 PROCEDURE — 82010 KETONE BODYS QUAN: CPT | Performed by: PEDIATRICS

## 2025-05-01 PROCEDURE — 80177 DRUG SCRN QUAN LEVETIRACETAM: CPT | Performed by: EMERGENCY MEDICINE

## 2025-05-01 PROCEDURE — 83930 ASSAY OF BLOOD OSMOLALITY: CPT | Performed by: STUDENT IN AN ORGANIZED HEALTH CARE EDUCATION/TRAINING PROGRAM

## 2025-05-01 PROCEDURE — 82805 BLOOD GASES W/O2 SATURATION: CPT | Performed by: EMERGENCY MEDICINE

## 2025-05-01 PROCEDURE — 258N000003 HC RX IP 258 OP 636: Performed by: EMERGENCY MEDICINE

## 2025-05-01 PROCEDURE — 36415 COLL VENOUS BLD VENIPUNCTURE: CPT | Performed by: EMERGENCY MEDICINE

## 2025-05-01 PROCEDURE — 84100 ASSAY OF PHOSPHORUS: CPT | Performed by: STUDENT IN AN ORGANIZED HEALTH CARE EDUCATION/TRAINING PROGRAM

## 2025-05-01 RX ORDER — POLYETHYLENE GLYCOL 3350 17 G/17G
17 POWDER, FOR SOLUTION ORAL 2 TIMES DAILY PRN
Status: DISCONTINUED | OUTPATIENT
Start: 2025-05-01 | End: 2025-05-03 | Stop reason: HOSPADM

## 2025-05-01 RX ORDER — ONDANSETRON 4 MG/1
4 TABLET, ORALLY DISINTEGRATING ORAL EVERY 6 HOURS PRN
Status: DISCONTINUED | OUTPATIENT
Start: 2025-05-01 | End: 2025-05-03 | Stop reason: HOSPADM

## 2025-05-01 RX ORDER — PROPRANOLOL HCL 20 MG
20 TABLET ORAL 2 TIMES DAILY
Status: DISCONTINUED | OUTPATIENT
Start: 2025-05-01 | End: 2025-05-03 | Stop reason: HOSPADM

## 2025-05-01 RX ORDER — ROSUVASTATIN CALCIUM 10 MG/1
10 TABLET, COATED ORAL DAILY
Status: DISCONTINUED | OUTPATIENT
Start: 2025-05-01 | End: 2025-05-03 | Stop reason: HOSPADM

## 2025-05-01 RX ORDER — ONDANSETRON 2 MG/ML
4 INJECTION INTRAMUSCULAR; INTRAVENOUS EVERY 6 HOURS PRN
Status: DISCONTINUED | OUTPATIENT
Start: 2025-05-01 | End: 2025-05-03 | Stop reason: HOSPADM

## 2025-05-01 RX ORDER — LIDOCAINE 40 MG/G
CREAM TOPICAL
Status: DISCONTINUED | OUTPATIENT
Start: 2025-05-01 | End: 2025-05-03 | Stop reason: HOSPADM

## 2025-05-01 RX ORDER — LORAZEPAM 2 MG/ML
1 INJECTION INTRAMUSCULAR ONCE
Status: COMPLETED | OUTPATIENT
Start: 2025-05-01 | End: 2025-05-01

## 2025-05-01 RX ORDER — AMOXICILLIN 250 MG
2 CAPSULE ORAL 2 TIMES DAILY PRN
Status: DISCONTINUED | OUTPATIENT
Start: 2025-05-01 | End: 2025-05-03 | Stop reason: HOSPADM

## 2025-05-01 RX ORDER — DULOXETIN HYDROCHLORIDE 30 MG/1
30 CAPSULE, DELAYED RELEASE ORAL 2 TIMES DAILY
Status: DISCONTINUED | OUTPATIENT
Start: 2025-05-01 | End: 2025-05-03 | Stop reason: HOSPADM

## 2025-05-01 RX ORDER — LISINOPRIL 40 MG/1
40 TABLET ORAL DAILY
Status: DISCONTINUED | OUTPATIENT
Start: 2025-05-01 | End: 2025-05-03 | Stop reason: HOSPADM

## 2025-05-01 RX ORDER — SODIUM CHLORIDE AND POTASSIUM CHLORIDE 150; 450 MG/100ML; MG/100ML
INJECTION, SOLUTION INTRAVENOUS CONTINUOUS
Status: DISCONTINUED | OUTPATIENT
Start: 2025-05-01 | End: 2025-05-02

## 2025-05-01 RX ORDER — DEXTROSE MONOHYDRATE 25 G/50ML
25-50 INJECTION, SOLUTION INTRAVENOUS
Status: DISCONTINUED | OUTPATIENT
Start: 2025-05-01 | End: 2025-05-01

## 2025-05-01 RX ORDER — NICOTINE POLACRILEX 4 MG
15-30 LOZENGE BUCCAL
Status: DISCONTINUED | OUTPATIENT
Start: 2025-05-01 | End: 2025-05-03 | Stop reason: HOSPADM

## 2025-05-01 RX ORDER — AMLODIPINE BESYLATE 5 MG/1
10 TABLET ORAL DAILY
Status: DISCONTINUED | OUTPATIENT
Start: 2025-05-01 | End: 2025-05-03 | Stop reason: HOSPADM

## 2025-05-01 RX ORDER — DEXTROSE MONOHYDRATE, SODIUM CHLORIDE, SODIUM LACTATE, POTASSIUM CHLORIDE, CALCIUM CHLORIDE 5; 600; 310; 179; 20 G/100ML; MG/100ML; MG/100ML; MG/100ML; MG/100ML
INJECTION, SOLUTION INTRAVENOUS CONTINUOUS
Status: DISCONTINUED | OUTPATIENT
Start: 2025-05-01 | End: 2025-05-02

## 2025-05-01 RX ORDER — SODIUM CHLORIDE 9 MG/ML
INJECTION, SOLUTION INTRAVENOUS CONTINUOUS
Status: DISCONTINUED | OUTPATIENT
Start: 2025-05-01 | End: 2025-05-02

## 2025-05-01 RX ORDER — OXYCODONE HYDROCHLORIDE 10 MG/1
10 TABLET ORAL
Status: DISCONTINUED | OUTPATIENT
Start: 2025-05-01 | End: 2025-05-02

## 2025-05-01 RX ORDER — CALCIUM CARBONATE 500 MG/1
1000 TABLET, CHEWABLE ORAL 4 TIMES DAILY PRN
Status: DISCONTINUED | OUTPATIENT
Start: 2025-05-01 | End: 2025-05-03 | Stop reason: HOSPADM

## 2025-05-01 RX ORDER — NICOTINE POLACRILEX 4 MG
15-30 LOZENGE BUCCAL
Status: DISCONTINUED | OUTPATIENT
Start: 2025-05-01 | End: 2025-05-01

## 2025-05-01 RX ORDER — LEVETIRACETAM 500 MG/5ML
60 INJECTION, SOLUTION, CONCENTRATE INTRAVENOUS ONCE
Status: COMPLETED | OUTPATIENT
Start: 2025-05-01 | End: 2025-05-01

## 2025-05-01 RX ORDER — AMOXICILLIN 250 MG
1 CAPSULE ORAL 2 TIMES DAILY PRN
Status: DISCONTINUED | OUTPATIENT
Start: 2025-05-01 | End: 2025-05-03 | Stop reason: HOSPADM

## 2025-05-01 RX ORDER — DEXTROSE MONOHYDRATE 25 G/50ML
25-50 INJECTION, SOLUTION INTRAVENOUS
Status: DISCONTINUED | OUTPATIENT
Start: 2025-05-01 | End: 2025-05-03 | Stop reason: HOSPADM

## 2025-05-01 RX ORDER — DEXTROSE MONOHYDRATE 100 MG/ML
INJECTION, SOLUTION INTRAVENOUS CONTINUOUS PRN
Status: DISCONTINUED | OUTPATIENT
Start: 2025-05-01 | End: 2025-05-03 | Stop reason: HOSPADM

## 2025-05-01 RX ORDER — METHADONE HYDROCHLORIDE 5 MG/1
5 TABLET ORAL 2 TIMES DAILY
Status: DISCONTINUED | OUTPATIENT
Start: 2025-05-01 | End: 2025-05-02

## 2025-05-01 RX ADMIN — SODIUM CHLORIDE 1000 ML: 0.9 INJECTION, SOLUTION INTRAVENOUS at 10:25

## 2025-05-01 RX ADMIN — LEVETIRACETAM 4500 MG: 100 INJECTION, SOLUTION INTRAVENOUS at 11:15

## 2025-05-01 RX ADMIN — AMLODIPINE BESYLATE 10 MG: 5 TABLET ORAL at 15:50

## 2025-05-01 RX ADMIN — INSULIN HUMAN 5.5 UNITS/HR: 1 INJECTION, SOLUTION INTRAVENOUS at 12:09

## 2025-05-01 RX ADMIN — LORAZEPAM 1 MG: 2 INJECTION INTRAMUSCULAR; INTRAVENOUS at 10:03

## 2025-05-01 RX ADMIN — RIVAROXABAN 20 MG: 20 TABLET, FILM COATED ORAL at 18:35

## 2025-05-01 RX ADMIN — ROSUVASTATIN CALCIUM 10 MG: 10 TABLET, FILM COATED ORAL at 15:50

## 2025-05-01 RX ADMIN — DULOXETINE HYDROCHLORIDE 30 MG: 30 CAPSULE, DELAYED RELEASE ORAL at 19:42

## 2025-05-01 RX ADMIN — METHADONE HYDROCHLORIDE 5 MG: 5 TABLET ORAL at 19:41

## 2025-05-01 RX ADMIN — PROPRANOLOL HYDROCHLORIDE 20 MG: 20 TABLET ORAL at 19:41

## 2025-05-01 RX ADMIN — SODIUM CHLORIDE: 0.9 INJECTION, SOLUTION INTRAVENOUS at 12:12

## 2025-05-01 RX ADMIN — LISINOPRIL 40 MG: 40 TABLET ORAL at 18:35

## 2025-05-01 ASSESSMENT — ACTIVITIES OF DAILY LIVING (ADL)
ADLS_ACUITY_SCORE: 56

## 2025-05-01 ASSESSMENT — COLUMBIA-SUICIDE SEVERITY RATING SCALE - C-SSRS
1. IN THE PAST MONTH, HAVE YOU WISHED YOU WERE DEAD OR WISHED YOU COULD GO TO SLEEP AND NOT WAKE UP?: NO
2. HAVE YOU ACTUALLY HAD ANY THOUGHTS OF KILLING YOURSELF IN THE PAST MONTH?: NO
6. HAVE YOU EVER DONE ANYTHING, STARTED TO DO ANYTHING, OR PREPARED TO DO ANYTHING TO END YOUR LIFE?: NO

## 2025-05-01 NOTE — ED TRIAGE NOTES
BIBA from home, hx CA and focal seizures. Has had 4 seizures so far today, usually one sided but now spreading to face. Seizures usually last a few minutes.

## 2025-05-01 NOTE — ED PROVIDER NOTES
"    [place] EMERGENCY DEPARTMENT ([facility])    5/01/25       ED PROVIDER NOTE   History     Chief Complaint   Patient presents with    Seizures    Hyperglycemia     The history is provided by the patient and medical records.     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  Patient has had history of focal seizures and can have abnormal right hand and finger movements that spread up his wrist and arm. This abnormal spread from right hand to upper arm is normal and patient can experience up to 4-7 times day. However earlier this morning patient experienced abnormal right hand and arm movements with it spreading up to his face and eyes which is atypical. He states having 4 episodes of this before he called 911 after. Of note, patient increased his Keppra dose a month ago but denies taking it in this morning. He increased blurred vision over the last month needing \"cheaters\" to read up close but no other obvious new neurologic complaints or deficits.  No new headaches.  Patient is back to baseline here in the ED. No other symptoms noted.       Past Medical History  Past Medical History:   Diagnosis Date    Atypical chest pain 12/02/2013    Cancer (H)     Complication of anesthesia     Diabetes (H)     GERD (gastroesophageal reflux disease) 12/02/2013    History of pulmonary embolism     HTN (hypertension) 05/14/2012    HTN, goal below 140/90 07/02/2013    Insomnia 02/21/2012    Mediastinal lymphadenopathy     Metastasis to brain (H) 2022    Migraine headache 07/02/2013    Migraine with aura, without mention of intractable migraine without mention of status migrainosus     Pneumonia      Past Surgical History:   Procedure Laterality Date    BRONCHOSCOPY RIGID OR FLEXIBLE W/TRANSENDOSCOPIC ENDOBRONCHIAL ULTRASOUND GUIDED " Bilateral 1/26/2022    Procedure: Right BRONCHOSCOPY, FIBEROPTIC, endobronchial ultrasound, pleural biopsy;  Surgeon: Dallin Agrawal MD;  Location: [facility] OR    INJECT BLOCK MEDIAL BRANCH CERVICAL/THORACIC/LUMBAR      INSERT CHEST TUBE Right 2/16/2022    Procedure: INSERTION, CATHETER, INTERCOSTAL, FOR DRAINAGE;  Surgeon: Dallin Agrawal MD;  Location: [facility] GI    INSERT CHEST TUBE Right 3/9/2022    Procedure: INSERTION, CATHETER, INTERCOSTAL, FOR DRAINAGE;  Surgeon: Sushila Antonio MD;  Location: [facility] GI    IR CHEST TUBE REMOVAL TUNNELED RIGHT  4/2/2022    OPTICAL TRACKING SYSTEM CRANIOTOMY, EXCISE TUMOR, COMBINED Left 6/28/2022    Procedure: Left stealth craniotomy for tumor resection with motor mapping;  Surgeon: Stephen Moran MD;  Location:  OR    OPTICAL TRACKING SYSTEM CRANIOTOMY, EXCISE TUMOR, COMBINED Right 6/27/2023    Procedure: Right stealth craniotomy and resection of tumor;  Surgeon: Stephen Moran MD;  Location:  OR    ORTHOPEDIC SURGERY      Ganesh. Rotator cuff repair.    PLEUROSCOPY N/A 1/26/2022    Procedure: Pleuroscopy with Pleural Biopsy;  Surgeon: Dallin Agrawal MD;  Location: [facility] OR     acetaminophen (TYLENOL) 325 MG tablet  albuterol (PROAIR HFA/PROVENTIL HFA/VENTOLIN HFA) 108 (90 Base) MCG/ACT inhaler  Alcohol Swabs PADS  amLODIPine (NORVASC) 10 MG tablet  blood glucose (NO BRAND SPECIFIED) lancets standard  blood glucose (NO BRAND SPECIFIED) test strip  Blood Glucose Monitoring Suppl (ACCU-CHEK GUIDE) w/Device KIT  Continuous Glucose  (FREESTYLE IRENE 3 READER) YVES  Continuous Glucose Sensor (FREESTYLE IRENE 3 SENSOR) MISC  cyclobenzaprine (FLEXERIL) 5 MG tablet  DULoxetine (CYMBALTA) 30 MG capsule  empagliflozin (JARDIANCE) 25 MG TABS tablet  FLUoxetine (PROZAC) 20 MG capsule  insulin aspart (NOVOLOG PEN) 100 UNIT/ML pen  insulin glargine (LANTUS PEN) 100 UNIT/ML pen  insulin pen needle (32G X 4 MM) 32G X 4 MM miscellaneous  levETIRAcetam (KEPPRA) 1000 MG  tablet  levETIRAcetam (KEPPRA) 500 MG tablet  lisinopril (ZESTRIL) 40 MG tablet  metFORMIN (GLUCOPHAGE) 500 MG tablet  methadone (DOLOPHINE) 5 MG tablet  methylphenidate (RITALIN) 5 MG tablet  naloxone (NARCAN) 4 MG/0.1ML nasal spray  oxyCODONE IR (ROXICODONE) 10 MG tablet  prochlorperazine (COMPAZINE) 10 MG tablet  propranolol (INDERAL) 20 MG tablet  rivaroxaban ANTICOAGULANT (XARELTO) 20 MG TABS tablet  rosuvastatin (CRESTOR) 10 MG tablet  semaglutide (OZEMPIC) 2 MG/3ML pen      Allergies   Allergen Reactions    Vicodin [Hydrocodone-Acetaminophen] Nausea and Vomiting and GI Disturbance     Family History  Family History   Problem Relation Age of Onset    Unknown/Adopted Mother     Uterine Cancer Mother     Unknown/Adopted Father     Unknown/Adopted Maternal Grandmother     Unknown/Adopted Maternal Grandfather     Stomach Cancer Maternal Grandfather     Unknown/Adopted Paternal Grandmother     Unknown/Adopted Paternal Grandfather     Melanoma Maternal Uncle      Social History   Social History     Tobacco Use    Smoking status: Never     Passive exposure: Never    Smokeless tobacco: Never   Vaping Use    Vaping status: Never Used   Substance Use Topics    Alcohol use: Yes     Alcohol/week: 0.0 standard drinks of alcohol     Comment: social    Drug use: No      A complete review of systems was performed with pertinent positives and negatives noted in the HPI, and all other systems negative.    Physical Exam   BP: (!) 179/135  Pulse: 100  Temp: 98.2  F (36.8  C)  Resp: 18  Weight: 99.8 kg (220 lb)  SpO2: 99 %  Physical Exam  Vitals and nursing note reviewed.       General: awake, alert, NAD  Head: normal cephalic  HEENT: pupils equal, conjugate gaze intact  Neck: Supple, no meningismus   CV: regular rate and rhythm without murmur  Lungs: clear to auscultation  Abd: soft, non-tender, no guarding, no peritoneal signs  EXT: lower extremities without swelling or edema  Neuro: awake, answers questions appropriately. No  focal deficits noted;   moves all upper and lower extremities equally.  Strength intact in upper and lower extremities.    ED Course, Procedures, & Data      Procedures            EKG Interpretation:      Interpreted by Maxx Ellison  Time reviewed: 10:25:39  Symptoms at time of EKG: Seizures, Hyperglycemic   Rhythm: normal sinus rhythm  Rate: 99 BPM  Axis: normal  Ectopy: none  Conduction: normal  ST Segments/ T Waves: No ST-T wave changes  Q Waves: none  Comparison to prior: Unchanged from 12/21/2024    Clinical Impression: No Acute Ischemia       Results for orders placed or performed during the hospital encounter of 05/01/25   Comprehensive metabolic panel     Status: Abnormal   Result Value Ref Range    Sodium 131 (L) 135 - 145 mmol/L    Potassium 3.9 3.4 - 5.3 mmol/L    Carbon Dioxide (CO2) 23 22 - 29 mmol/L    Anion Gap 14 7 - 15 mmol/L    Urea Nitrogen 14.3 6.0 - 20.0 mg/dL    Creatinine 0.58 (L) 0.67 - 1.17 mg/dL    GFR Estimate >90 >60 mL/min/1.73m2    Calcium 9.5 8.8 - 10.4 mg/dL    Chloride 94 (L) 98 - 107 mmol/L    Glucose 758 (HH) 70 - 99 mg/dL    Alkaline Phosphatase 173 (H) 40 - 150 U/L    AST 20 0 - 45 U/L    ALT 23 0 - 70 U/L    Protein Total 6.7 6.4 - 8.3 g/dL    Albumin 4.2 3.5 - 5.2 g/dL    Bilirubin Total 0.4 <=1.2 mg/dL   Troponin T, High Sensitivity     Status: Normal   Result Value Ref Range    Troponin T, High Sensitivity 8 <=22 ng/L   Glucose by meter     Status: Abnormal   Result Value Ref Range    GLUCOSE BY METER POCT >600 (HH) 70 - 99 mg/dL   CBC with platelets and differential     Status: None   Result Value Ref Range    WBC Count 8.4 4.0 - 11.0 10e3/uL    RBC Count 5.36 4.40 - 5.90 10e6/uL    Hemoglobin 16.4 13.3 - 17.7 g/dL    Hematocrit 47.4 40.0 - 53.0 %    MCV 88 78 - 100 fL    MCH 30.6 26.5 - 33.0 pg    MCHC 34.6 31.5 - 36.5 g/dL    RDW 12.1 10.0 - 15.0 %    Platelet Count 203 150 - 450 10e3/uL    % Neutrophils 63 %    % Lymphocytes 26 %    % Monocytes 7 %    % Eosinophils 4 %     % Basophils 1 %    % Immature Granulocytes 1 %    NRBCs per 100 WBC 0 <1 /100    Absolute Neutrophils 5.3 1.6 - 8.3 10e3/uL    Absolute Lymphocytes 2.2 0.8 - 5.3 10e3/uL    Absolute Monocytes 0.6 0.0 - 1.3 10e3/uL    Absolute Eosinophils 0.3 0.0 - 0.7 10e3/uL    Absolute Basophils 0.1 0.0 - 0.2 10e3/uL    Absolute Immature Granulocytes 0.1 <=0.4 10e3/uL    Absolute NRBCs 0.0 10e3/uL   Blood gas venous     Status: Abnormal   Result Value Ref Range    pH Venous 7.35 7.32 - 7.43    pCO2 Venous 54 (H) 40 - 50 mm Hg    pO2 Venous 26 25 - 47 mm Hg    Bicarbonate Venous 30 (H) 21 - 28 mmol/L    Base Excess/Deficit Venous 2.8 -3.0 - 3.0 mmol/L    FIO2 21     Oxyhemoglobin Venous 42 (L) 70 - 75 %    O2 Sat, Venous 42.3 (L) 70.0 - 75.0 %    Glucose 717 (HH) 70 - 99 mg/dL    Narrative    In healthy individuals, oxyhemoglobin (O2Hb) and oxygen saturation (SO2) are approximately equal. In the presence of dyshemoglobins, oxyhemoglobin can be considerably lower than oxygen saturation.   Keppra (Levetiracetam) Level     Status: Abnormal   Result Value Ref Range    Keppra (Levetiracetam) Level <2.0 (L) 10.0 - 40.0  g/mL   Troponin T, High Sensitivity     Status: Normal   Result Value Ref Range    Troponin T, High Sensitivity 9 <=22 ng/L   Glucose by meter     Status: Abnormal   Result Value Ref Range    GLUCOSE BY METER POCT >600 (HH) 70 - 99 mg/dL   Glucose by meter     Status: Abnormal   Result Value Ref Range    GLUCOSE BY METER POCT 477 (H) 70 - 99 mg/dL   Glucose by meter     Status: Abnormal   Result Value Ref Range    GLUCOSE BY METER POCT 387 (H) 70 - 99 mg/dL   Osmolality     Status: Abnormal   Result Value Ref Range    Osmolality Blood 312 (H) 275 - 295 mmol/kg    Narrative    Greater than 385 mmol/kg relates to stupor in hyperglycemia   Greater than 400 mmol/kg can relate to seizures   Greater than 420 mmol/kg can be lethal    Serum Osmalar Gap:   Normal <10   Larger suggest unmeasured substances present in serum  (ethanol, methanol, isopropanol, mannitol, ethylene glycol).   Ketone Beta-Hydroxybutyrate Quantitative     Status: Normal   Result Value Ref Range    Ketone (Beta-Hydroxybutyrate) Quantitative 0.28 <=0.30 mmol/L   Blood gas venous     Status: Abnormal   Result Value Ref Range    pH Venous 7.41 7.32 - 7.43    pCO2 Venous 48 40 - 50 mm Hg    pO2 Venous 44 25 - 47 mm Hg    Bicarbonate Venous 31 (H) 21 - 28 mmol/L    Base Excess/Deficit Venous 4.8 (H) -3.0 - 3.0 mmol/L    FIO2 21     Oxyhemoglobin Venous 77 (H) 70 - 75 %    O2 Sat, Venous 78.2 (H) 70.0 - 75.0 %    Narrative    In healthy individuals, oxyhemoglobin (O2Hb) and oxygen saturation (SO2) are approximately equal. In the presence of dyshemoglobins, oxyhemoglobin can be considerably lower than oxygen saturation.   Glucose by meter     Status: Abnormal   Result Value Ref Range    GLUCOSE BY METER POCT 411 (H) 70 - 99 mg/dL   Ketone Beta-Hydroxybutyrate Quantitative     Status: Abnormal   Result Value Ref Range    Ketone (Beta-Hydroxybutyrate) Quantitative 0.46 (H) <=0.30 mmol/L   Glucose by meter     Status: Abnormal   Result Value Ref Range    GLUCOSE BY METER POCT 318 (H) 70 - 99 mg/dL   EKG 12-lead, tracing only     Status: None (Preliminary result)   Result Value Ref Range    Systolic Blood Pressure  mmHg    Diastolic Blood Pressure  mmHg    Ventricular Rate 99 BPM    Atrial Rate 99 BPM    VT Interval 144 ms    QRS Duration 80 ms     ms    QTc 477 ms    P Axis 40 degrees    R AXIS 12 degrees    T Axis 49 degrees    Interpretation ECG       Sinus rhythm  Possible Left atrial enlargement  Borderline ECG    Unconfirmed report - interpretation of this ECG is computer generated - see medical record for final interpretation     EKG 12-lead, tracing only - DKA     Status: None (Preliminary result)   Result Value Ref Range    Systolic Blood Pressure  mmHg    Diastolic Blood Pressure  mmHg    Ventricular Rate 93 BPM    Atrial Rate 93 BPM    VT Interval 146 ms     QRS Duration 82 ms     ms    QTc 450 ms    P Axis 33 degrees    R AXIS 24 degrees    T Axis 44 degrees    Interpretation ECG Sinus rhythm  Normal ECG      CBC with platelets differential     Status: None    Narrative    The following orders were created for panel order CBC with platelets differential.  Procedure                               Abnormality         Status                     ---------                               -----------         ------                     CBC with platelets and ...[[id number]]                      Final result                 Please view results for these tests on the individual orders.   CBC with platelets differential     Status: None ()    Narrative    The following orders were created for panel order CBC with platelets differential.  Procedure                               Abnormality         Status                     ---------                               -----------         ------                     CBC with platelets and ...[[id number]]                                                   Please view results for these tests on the individual orders.     Medications   dextrose 10% infusion (has no administration in time range)   insulin regular (MYXREDLIN) 1 unit/mL infusion (8 Units/hr Intravenous Rate/Dose Change 5/1/25 1548)   glucose gel 15-30 g (has no administration in time range)     Or   dextrose 50 % injection 25-50 mL (has no administration in time range)     Or   glucagon injection 1 mg (has no administration in time range)   sodium chloride 0.9 % infusion ( Intravenous Rate/Dose Verify 5/1/25 1534)   lidocaine 1 % 0.1-1 mL (has no administration in time range)   lidocaine (LMX4) cream (has no administration in time range)   sodium chloride (PF) 0.9% PF flush 3 mL (3 mLs Intracatheter Not Given 5/1/25 1530)   sodium chloride (PF) 0.9% PF flush 3 mL (has no administration in time range)   senna-docusate (SENOKOT-S/PERICOLACE) 8.6-50 MG per tablet 1 tablet  (has no administration in time range)     Or   senna-docusate (SENOKOT-S/PERICOLACE) 8.6-50 MG per tablet 2 tablet (has no administration in time range)   calcium carbonate (TUMS) chewable tablet 1,000 mg (has no administration in time range)   polyethylene glycol (MIRALAX) Packet 17 g (has no administration in time range)   ondansetron (ZOFRAN ODT) ODT tab 4 mg (has no administration in time range)     Or   ondansetron (ZOFRAN) injection 4 mg (has no administration in time range)   0.45% sodium chloride + KCl 20 mEq/L infusion (has no administration in time range)   DULoxetine (CYMBALTA) DR capsule 30 mg (has no administration in time range)   FLUoxetine (PROzac) capsule 20 mg (has no administration in time range)   amLODIPine (NORVASC) tablet 10 mg (10 mg Oral $Given 5/1/25 1550)   lisinopril (ZESTRIL) tablet 40 mg (has no administration in time range)   rivaroxaban ANTICOAGULANT (XARELTO) tablet 20 mg (has no administration in time range)   rosuvastatin (CRESTOR) tablet 10 mg (10 mg Oral $Given 5/1/25 1550)   methadone (DOLOPHINE) tablet 5 mg (has no administration in time range)   propranolol (INDERAL) tablet 20 mg (has no administration in time range)   LORazepam (ATIVAN) injection 1 mg (1 mg Intravenous $Given 5/1/25 1003)   sodium chloride 0.9% BOLUS 1,000 mL (0 mLs Intravenous Stopped 5/1/25 1217)   levETIRAcetam (KEPPRA) injection for EMERGENT loading dose 4,500 mg (4,500 mg Intravenous $Given 5/1/25 1115)     Labs Ordered and Resulted from Time of ED Arrival to Time of ED Departure   COMPREHENSIVE METABOLIC PANEL - Abnormal       Result Value    Sodium 131 (*)     Potassium 3.9      Carbon Dioxide (CO2) 23      Anion Gap 14      Urea Nitrogen 14.3      Creatinine 0.58 (*)     GFR Estimate >90      Calcium 9.5      Chloride 94 (*)     Glucose 758 (*)     Alkaline Phosphatase 173 (*)     AST 20      ALT 23      Protein Total 6.7      Albumin 4.2      Bilirubin Total 0.4     GLUCOSE BY METER - Abnormal     GLUCOSE BY METER POCT >600 (*)    BLOOD GAS VENOUS - Abnormal    pH Venous 7.35      pCO2 Venous 54 (*)     pO2 Venous 26      Bicarbonate Venous 30 (*)     Base Excess/Deficit Venous 2.8      FIO2 21      Oxyhemoglobin Venous 42 (*)     O2 Sat, Venous 42.3 (*)     Glucose 717 (*)    KEPPRA (LEVETIRACETAM) LEVEL - Abnormal    Keppra (Levetiracetam) Level <2.0 (*)    GLUCOSE BY METER - Abnormal    GLUCOSE BY METER POCT >600 (*)    GLUCOSE BY METER - Abnormal    GLUCOSE BY METER POCT 477 (*)    GLUCOSE BY METER - Abnormal    GLUCOSE BY METER POCT 387 (*)    OSMOLALITY - Abnormal    Osmolality Blood 312 (*)    BLOOD GAS VENOUS - Abnormal    pH Venous 7.41      pCO2 Venous 48      pO2 Venous 44      Bicarbonate Venous 31 (*)     Base Excess/Deficit Venous 4.8 (*)     FIO2 21      Oxyhemoglobin Venous 77 (*)     O2 Sat, Venous 78.2 (*)    GLUCOSE BY METER - Abnormal    GLUCOSE BY METER POCT 411 (*)    KETONE BETA-HYDROXYBUTYRATE QUANTITATIVE, RAPID - Abnormal    Ketone (Beta-Hydroxybutyrate) Quantitative 0.46 (*)    GLUCOSE BY METER - Abnormal    GLUCOSE BY METER POCT 318 (*)    TROPONIN T, HIGH SENSITIVITY - Normal    Troponin T, High Sensitivity 8     TROPONIN T, HIGH SENSITIVITY - Normal    Troponin T, High Sensitivity 9     KETONE BETA-HYDROXYBUTYRATE QUANTITATIVE, RAPID - Normal    Ketone (Beta-Hydroxybutyrate) Quantitative 0.28     CBC WITH PLATELETS AND DIFFERENTIAL    WBC Count 8.4      RBC Count 5.36      Hemoglobin 16.4      Hematocrit 47.4      MCV 88      MCH 30.6      MCHC 34.6      RDW 12.1      Platelet Count 203      % Neutrophils 63      % Lymphocytes 26      % Monocytes 7      % Eosinophils 4      % Basophils 1      % Immature Granulocytes 1      NRBCs per 100 WBC 0      Absolute Neutrophils 5.3      Absolute Lymphocytes 2.2      Absolute Monocytes 0.6      Absolute Eosinophils 0.3      Absolute Basophils 0.1      Absolute Immature Granulocytes 0.1      Absolute NRBCs 0.0     ROUTINE UA WITH MICROSCOPIC  REFLEX TO CULTURE   GLUCOSE MONITOR NURSING POCT   GLUCOSE MONITOR NURSING POCT   BASIC METABOLIC PANEL   GLUCOSE MONITOR NURSING POCT   BASIC METABOLIC PANEL   BLOOD GAS ARTERIAL   OSMOLALITY   PHOSPHORUS   MAGNESIUM   HEPATIC FUNCTION PANEL   HEMOGLOBIN A1C   CBC WITH PLATELETS AND DIFFERENTIAL   URINE CULTURE     No orders to display        Critical care: Patient presents with seizure, actively seizing upon arrival also hyperglycemic required emergent bedside evaluation and giving 1 of IV Ativan, will also start IV fluids given hyperglycemia.  Patient is now back to baseline without any acute neurologic deficits time was also spent reassessing neurologic exam, reviewing previous imaging, medications, consulting neurology and coordinating care.  Time also spent documenting.  Total critical care for this patient was 55 minutes    Assessment & Plan    Connor is a 44-year-old male presents with worsening seizure activity actively having a partial seizure upon arrival.  Was given IV Ativan.  Patient otherwise has a normal neurologic exam.  Seizure remitted with IV Ativan.  He does have hyperglycemia; denies recent infectious symptoms he does note that he has been not taking his insulin for over a week.    Will start with IV fluids and blood work before treating hyperglycemia.  He also sounds as though he is possibly not taking his Keppra as prescribed he supposed be on 1500 mg twice daily according to the chart, he reports he is taking it daily.  He did not take his Keppra this morning so we will give him a Keppra load here in the ER.  Awaiting basic labs.  Have a neurology consult placed.  He is back to baseline so not obtain stat head CT imaging, patient was likely going to require MRI imaging during this visit.     CMP shows a glucose of 758 however no associated acidosis.  Potassium is 3.9 will start on insulin drip given his significant hyperglycemia this is likely secondary to medication noncompliance.    EKG  shows no signs of acute ischemia.  Troponin is normal.  He has a normal white count normal hemoglobin    Patient's Keppra level was undetectable.  This is likely contributing to his worsening seizures.      Patient was seen and neuro evaluated by neurology.  Please see her note for full details; in short given his normal neurologic status, his known is not having neural lesions and undetectable Keppra levels they did not think that repeat neuroimaging was warranted at this time.  They are making medication changes to try to increase compliance.  Given his hyperglycemia he will be admitted to the medicine service for transitioning back to his home insulin regiment.  Discussed potassium we will do a twice daily potassium checked.  Patient will be admitted to the medicine service should he have any changes in neurostatus would have low threshold to repeat neuroimaging but will hold off at this time based on neurology recommendations.      I have reviewed the nursing notes. I have reviewed the findings, diagnosis, plan and need for follow up with the patient.    New Prescriptions    No medications on file       Final diagnoses:   Seizure (H)   Hyperglycemia   I, Lambert Shah Her, am serving as a trained medical scribe to document services personally performed by Maxx Ryan MD, based on the provider's statements to me.     I, Maxx Ryan MD, was physically present and have reviewed and verified the accuracy of this note documented by Lambert Nowak.     Maxx Ryan MD  formerly [facility] EMERGENCY DEPARTMENT  5/1/2025     Maxx Ryan MD  05/01/25 3920

## 2025-05-01 NOTE — PROGRESS NOTES
Brief Diabetes Note - FULL CONSULT TO FOLLOW IN AM:     Diabetes service has reviewed salient aspects of care, BG trend and insulin plan today.   Appropriate adjustments made to assist with currently hyperglycemia      Consult request for: DKA / HHS       Current B      Recent Labs:  LR=748, K=3.9, creat=0.58, TCo2=23, AG=14  Ketone quant=0.46  Serum frcp=518    Briefly connected with team. Discussed with florida GRAY MD to do consult in am     PTA diabetes medications were:    Per medication list:  Lantus 80 units in am  Jardiance 25 mg po daily  Metformin 500 bid    Anticipated Discharge:  A few days per primary team    Steroids: no plans for steroids    Fluid with dextrose: None      Diet:NPO due to seizure activity      Any future Procedures      Assessment:       1.  T2DM , uncontrolled, A1c= 15.3     2. Acute hyperglycemia, not DKA and not HHS        3.  Obesity:  BMI 32    4.  Anemia; hgb 8.3     5. Seizures while on Keppra, NPO        Recommendations/Plan:   Full consult to follow in AM   -Continue non DKA insulin drip   -  Add Novolog  cho when allowed to eat:  1 unit per 6 g of CHO  -even if on insulin drip  - Hold PTA Jardiance and Metformin    -  BG checks every 1 hours per insulin drip policy   -  No Novolog correction scale while on insulin drip  -  Test claim; ordered    -  Recommend cho counting protocol    -  Hypoglycemia protocol    -  Education:  Will need education, newly diagnosed and will need insulin for home. Ordered     -  Full consult to follow in AM    Discussion:  calculated osmo 309 with NA, glucose and bun and  measures znzr=010 and 311  With  normal mental status, unlikely HHS and ketones 0.46 and 0.28, VBG ph=7.41, not dka      Dulce Carrero PA-C  Inpatient Diabetes Management Service  Pager - [phone]   [place]     To contact Endocrine Diabetes service:   From 8AM-4PM: page inpatient diabetes provider that is following the patient that day (see filed or incomplete progress  notes/consult notes).  If uncertain of provider assignment: page job code [phone].  For questions or updates from 4PM-8AM: page the diabetes job code for on call fellow: [phone]     Please notify inpatient diabetes service if changes are planned to steroids, nutrition, or if procedures are planned requiring prolonged NPO status.Diabetes Management Team job code: [phone]

## 2025-05-01 NOTE — H&P
[facility]    History and Physical - Medicine Service, [facility] TEAM 4       Date of Admission:  5/1/2025    Assessment & Plan    Connor Emerson is a 44-year-old male with past medical history of type 2 diabetes, hypertension, metastatic non-small cell lung carcinoma with mets to brain status post radiation complicated by radiation-induced brain necrosis status post craniotomy who is presenting today with breakthrough seizures while on Keppra as well as hyperglycemia concerning for HHS versus DKA.      # Seizures while Keppra  # Brain mets s/p radiation c/b radiation induced brain necrosis s/p craniotomy  # Metastatic non-small cell lung carcinoma  Patient reports that he has been having seizures for the past week about 7 times. Today he had 3 seizures.  States he has been taking 1000 mg of Keppra per day rather than twice per day which he has been doing for about a month previously was taking 500 mg once per day.  Patient states that his seizures primarily started in his right hand which caused him to lock up.  Will cause him to seek medical attention this time around was the fact that he had uncontrollable eye twitching which is different from his usual right hand seizures. He is afebrile with systolics to 180s tachycardic to 100 breathing well on room air.  Physical exam is without any neurological focal deficits.  Notable labs are as follows sodium 131, VBG, white count within normal limits, EKG without abnormalities, Keppra level is undetectable. Patient was evaluated by neurology and loaded with Keppra.  They do not feel that advanced imaging is necessary at this time given that the patient has returned to baseline.  - s/p keppra load with 4500 mg  - Keppra 1500 BID  - Neurology following   - If with breakthrough seizures after Keppra loading call Neuro for advanced imaging vs EEG.      # Insulin-requiring type 2 diabetes mellitus  # Hyperosmolar hyperglycemic state  He  additionally states he ran out of insulin about a week ago and he has been urinating frequently has not been able to keep up with his hydration. Glucose initially 758 on admission. Patient states he has insurance issues with poor access to medications.  - Endocrinology consulted, appreciate recommendations  - Insulin gtt  - s/p 1000 ml bolus and now on maintenance fluids  - HOLD PTA glargine 80 units  - HOLD PTA Empaglaflozin  - HOLD PTA Metformin      # Hypertension  # HLD  - Continue PTA Amlodipine 10 mg  - Continue PTA lisinopril 40 mg  - Continue PTA propranolol 20 mg  - Continue PTA Rosuvastatin 10 mg      # Hx of malignancy provoked PE  - Continue PTA rivaroxiabn 20 mg daily      # Cancer related pain  # Mood  - Palliative consult for management of chronic pain as patient has established care. Also unsure if he is compliant with methadone  - Methadone 5 mg BID  - Oxycodone 10 mg Q6H PRN  - Duloxetine 30 mg             Diet: NPO for Medical/Clinical Reasons Except for: Meds, Ice Chips  DVT Prophylaxis: Mechanical  Heart Catheter: Not present  Fluids: bolus + maintenance  Lines: None     Cardiac Monitoring: ACTIVE order. Indication: Syncope- low cardiac risk (24 hours)  Code Status: Full Code      Clinically Significant Risk Factors Present on Admission         # Hyponatremia: Lowest Na = 131 mmol/L in last 2 days, will monitor as appropriate  # Hypochloremia: Lowest Cl = 94 mmol/L in last 2 days, will monitor as appropriate         # Drug Induced Coagulation Defect: home medication list includes an anticoagulant medication    # Hypertension: Noted on problem list          # DMII: A1C = 15.3 % (Ref range: <5.7 %) within past 6 months        # Financial/Environmental Concerns:           Disposition Plan      Expected Discharge Date: 05/02/2025                The patient's care was discussed with the supervising physician      Eliezer Garrison MD PhD  Medicine Service, [facility] TEAM 4  [facility]  [facility]  Securely message with [employer] (more info)  Text page via [employer] Paging/Directory   See signed in provider for up to date coverage information  ______________________________________________________________________    Chief Complaint   Seizures    History is obtained from the patient    History of Present Illness   Connor Emerson is a 44 year old male who     Patient reports that he has been having seizures for the past week about 7 times. Today he had 3 seizures.  States he has been taking 1000 mg of Keppra per day rather than twice per day which he has been doing for about a month previously was taking 500 mg once per day.  Patient states that his seizures primarily started in his right hand which caused him to lock up.  Will cause him to seek medical attention this time around was the fact that he had uncontrollable eye twitching which is different from his usual right hand seizures.    He additionally states he ran out of insulin about a week ago and he has been urinating frequently has not been able to keep up with his hydration. Patient states he has insurance issues with poor access to medications.    He is afebrile with systolics to 180s tachycardic to 100 breathing well on room air.  Physical exam is without any neurological focal deficits.  Notable labs are as follows sodium 131, glucose initially 758 now down to 318, VBG, white count within normal limits, EKG without abnormalities, Keppra level is undetectable.    Patient was evaluated by neurology and loaded with Keppra.  They do not feel that advanced imaging is necessary at this time given that the patient has returned to baseline.  However should he have seizures while loaded on Keppra this would prompt brain imaging versus EEG.      Past Medical History    Past Medical History:   Diagnosis Date    Atypical chest pain 12/02/2013    Cancer (H)     Complication of anesthesia     Diabetes (H)     GERD (gastroesophageal reflux  disease) 12/02/2013    History of pulmonary embolism     HTN (hypertension) 05/14/2012    HTN, goal below 140/90 07/02/2013    Insomnia 02/21/2012    Mediastinal lymphadenopathy     Metastasis to brain (H) 2022    Migraine headache 07/02/2013    Migraine with aura, without mention of intractable migraine without mention of status migrainosus     Pneumonia        Past Surgical History   Past Surgical History:   Procedure Laterality Date    BRONCHOSCOPY RIGID OR FLEXIBLE W/TRANSENDOSCOPIC ENDOBRONCHIAL ULTRASOUND GUIDED Bilateral 1/26/2022    Procedure: Right BRONCHOSCOPY, FIBEROPTIC, endobronchial ultrasound, pleural biopsy;  Surgeon: Dallin Agrawal MD;  Location: [facility] OR    INJECT BLOCK MEDIAL BRANCH CERVICAL/THORACIC/LUMBAR      INSERT CHEST TUBE Right 2/16/2022    Procedure: INSERTION, CATHETER, INTERCOSTAL, FOR DRAINAGE;  Surgeon: Dallin Agrawal MD;  Location: [facility] GI    INSERT CHEST TUBE Right 3/9/2022    Procedure: INSERTION, CATHETER, INTERCOSTAL, FOR DRAINAGE;  Surgeon: Sushila Antonio MD;  Location: [facility] GI    IR CHEST TUBE REMOVAL TUNNELED RIGHT  4/2/2022    OPTICAL TRACKING SYSTEM CRANIOTOMY, EXCISE TUMOR, COMBINED Left 6/28/2022    Procedure: Left stealth craniotomy for tumor resection with motor mapping;  Surgeon: Stephen Moran MD;  Location:  OR    OPTICAL TRACKING SYSTEM CRANIOTOMY, EXCISE TUMOR, COMBINED Right 6/27/2023    Procedure: Right stealth craniotomy and resection of tumor;  Surgeon: Stephen Moran MD;  Location:  OR    ORTHOPEDIC SURGERY      Ganesh. Rotator cuff repair.    PLEUROSCOPY N/A 1/26/2022    Procedure: Pleuroscopy with Pleural Biopsy;  Surgeon: Dallin Agrawal MD;  Location: [facility] OR       Prior to Admission Medications   Prior to Admission Medications   Prescriptions Last Dose Informant Patient Reported? Taking?   Alcohol Swabs PADS  Spouse/Significant Other No No   Sig: Use to swab the area of the injection or jabier as directed. Per insurance coverage   Blood Glucose  Monitoring Suppl (ACCU-CHEK GUIDE) w/Device KIT   Yes No   Continuous Glucose  (FREESTYLE IRENE 3 READER) YVES   Yes No   Continuous Glucose Sensor (FREESTYLE IRENE 3 SENSOR) [facility]   No No   Si each every 14 days. Use 1 sensor every 14 days. Use to read blood sugars per 's instructions.   DULoxetine (CYMBALTA) 30 MG capsule   No No   Sig: Take 1 capsule (30 mg) by mouth 2 times daily.   FLUoxetine (PROZAC) 20 MG capsule   No No   Sig: Take 1 capsule (20 mg) by mouth daily.   acetaminophen (TYLENOL) 325 MG tablet   Yes No   Sig: Take 325-650 mg by mouth every 6 hours as needed for mild pain   albuterol (PROAIR HFA/PROVENTIL HFA/VENTOLIN HFA) 108 (90 Base) MCG/ACT inhaler  Spouse/Significant Other No No   Sig: Inhale 2 puffs into the lungs every 6 hours as needed for shortness of breath, wheezing or cough   Patient not taking: Reported on 10/16/2024   amLODIPine (NORVASC) 10 MG tablet   No No   Sig: Take 1 tablet (10 mg) by mouth daily.   blood glucose (NO BRAND SPECIFIED) lancets standard  Spouse/Significant Other No No   Sig: To use to test glucose level in the blood Use to test blood sugar  4  times daily as directed. To accompany glucose monitor brands per insurance coverage.   blood glucose (NO BRAND SPECIFIED) test strip  Spouse/Significant Other No No   Sig: To use to test glucose level in the blood Use to test blood sugar  4 times daily as directed. To accompany glucose monitor brands per insurance coverage.   cyclobenzaprine (FLEXERIL) 5 MG tablet   No No   Sig: Take 2 tablets (10 mg) by mouth nightly as needed for muscle spasms.   empagliflozin (JARDIANCE) 25 MG TABS tablet   No No   Sig: Take 1 tablet (25 mg) by mouth daily.   insulin aspart (NOVOLOG PEN) 100 UNIT/ML pen  Spouse/Significant Other No No   Sig: Inject 0-40 Units Subcutaneous 3 times daily (before meals) Per discharge instructions sliding scale   Patient not taking: Reported on 2024   insulin glargine (LANTUS PEN)  100 UNIT/ML pen   No No   Sig: Inject 80 Units subcutaneously every morning.   insulin pen needle (32G X 4 MM) 32G X 4 MM miscellaneous   No No   Sig: Use as directed by provider. Per insurance coverage   levETIRAcetam (KEPPRA) 1000 MG tablet   No No   Sig: Take 1 tablet (1,000 mg) by mouth 2 times daily   levETIRAcetam (KEPPRA) 500 MG tablet   No No   Sig: Take 1 tablet (500 mg) by mouth 2 times daily. Add this to your prescribed 1000 mg dose twice daily to equate to 1500 mg twice daily.   lisinopril (ZESTRIL) 40 MG tablet   No No   Sig: Take 1 tablet (40 mg) by mouth daily.   metFORMIN (GLUCOPHAGE) 500 MG tablet   No No   Sig: Take 1 tablet (500 mg) by mouth 2 times daily (with meals)   methadone (DOLOPHINE) 5 MG tablet   No No   Sig: Take 1 tablet (5 mg) by mouth 2 times daily.   methylphenidate (RITALIN) 5 MG tablet   No No   Sig: Take 1-2 tablets (5-10 mg) by mouth 2 times daily as needed (fatigue). Take second dose by 12 noon, if it is needed   naloxone (NARCAN) 4 MG/0.1ML nasal spray  Spouse/Significant Other No No   Sig: Spray 1 spray (4 mg) into one nostril alternating nostrils as needed for opioid reversal every 2-3 minutes until assistance arrives   Patient not taking: Reported on 9/4/2024   oxyCODONE IR (ROXICODONE) 10 MG tablet   No No   Sig: Take 1 tablet (10 mg) by mouth every 3 hours as needed for moderate pain.   prochlorperazine (COMPAZINE) 10 MG tablet   No No   Sig: Take 1 tablet (10 mg) by mouth every 6 hours as needed for nausea or vomiting   Patient not taking: Reported on 8/5/2024   propranolol (INDERAL) 20 MG tablet   No No   Sig: Take 1 tablet (20 mg) by mouth 2 times daily   rivaroxaban ANTICOAGULANT (XARELTO) 20 MG TABS tablet   No No   Sig: Take 1 tablet (20 mg) by mouth daily (with dinner)   rosuvastatin (CRESTOR) 10 MG tablet   No No   Sig: Take 1 tablet (10 mg) by mouth daily.   semaglutide (OZEMPIC) 2 MG/3ML pen   No No   Sig: Inject 0.5 mg subcutaneously every 7 days. For 4 weeks  then increase to 0.5 mg once weekly if tolerating      Facility-Administered Medications: None        Review of Systems    The 10 point Review of Systems is negative other than noted in the HPI or here.      Physical Exam   Vital Signs: Temp: 98.2  F (36.8  C) Temp src: Oral BP: (!) 133/98 Pulse: 96   Resp: 18 SpO2: 97 % O2 Device: None (Room air)    Weight: 220 lbs 0 oz    N: Alert and oriented x3. Grossly normal neurologic functioning  P: Clear bilaterally, breathing well without retractions  C: RRR, no murmurs appreciated, adiaphoretic  GI: Soft, non-tender, non-distended  M: No lower extremity edema appreciated. No noted skin rashes. No noted lesions     Medical Decision Making       Please see A&P for additional details of medical decision making.      Data     I have personally reviewed the following data over the past 24 hrs:    9.6  \   15.7   / 203     131 (L) 94 (L) 14.3 /  286 (H)   3.9 23 0.58 (L) \     ALT: 23 AST: 20 AP: 173 (H) TBILI: 0.4   ALB: 4.2 TOT PROTEIN: 6.7 LIPASE: N/A     Trop: 9 BNP: N/A     TSH: N/A T4: N/A A1C: 15.3 (H)

## 2025-05-01 NOTE — MEDICATION SCRIBE - ADMISSION MEDICATION HISTORY
Medication Scribe Admission Medication History    Admission medication history is complete. The information provided in this note is only as accurate as the sources available at the time of the update.    Information Source(s): Patient via in-person    Pertinent Information: Connor reports running out of the following medications a couple of weeks ago: FLUoxetine (PROZAC) 20 MG capsule, methadone (DOLOPHINE) 5 MG tablet, and oxyCODONE IR (ROXICODONE) 10 MG tablet (recent refilled). Per patient, on average he takes about 6 pills of the oxyCODONE IR (ROXICODONE) 10 MG tablet per day. He noted that he has not been checking his blood sugar at home. He reports no changes in current medications. Patient denies taking any other medications. Dispense report and outside medication reconciliation list have been reviewed.     Changes made to PTA medication list:  Added: None  Deleted:    insulin aspart (NOVOLOG PEN) 100 UNIT/ML pen  semaglutide (OZEMPIC) 2 MG/3ML pen  Changed: None    Allergies reviewed with patient and updates made in EHR: yes    Medication History Completed By: Gui Collazo 5/1/2025 4:31 PM    PTA Med List   Medication Sig Note Last Dose/Taking    acetaminophen (TYLENOL) 325 MG tablet Take 325-650 mg by mouth every 6 hours as needed for mild pain  Taking As Needed    albuterol (PROAIR HFA/PROVENTIL HFA/VENTOLIN HFA) 108 (90 Base) MCG/ACT inhaler Inhale 2 puffs into the lungs every 6 hours as needed for shortness of breath, wheezing or cough  Taking As Needed    Alcohol Swabs PADS Use to swab the area of the injection or jabier as directed. Per insurance coverage  Taking    amLODIPine (NORVASC) 10 MG tablet Take 1 tablet (10 mg) by mouth daily.  4/30/2025 Morning    blood glucose (NO BRAND SPECIFIED) lancets standard To use to test glucose level in the blood Use to test blood sugar  4  times daily as directed. To accompany glucose monitor brands per insurance coverage.  Taking    blood glucose (NO BRAND  SPECIFIED) test strip To use to test glucose level in the blood Use to test blood sugar  4 times daily as directed. To accompany glucose monitor brands per insurance coverage.  Taking    Blood Glucose Monitoring Suppl (ACCU-CHEK GUIDE) w/Device KIT   Taking    Continuous Glucose  (FREESTYLE IRENE 3 READER) YVES   Taking    Continuous Glucose Sensor (FREESTYLE IRENE 3 SENSOR) MISC 1 each every 14 days. Use 1 sensor every 14 days. Use to read blood sugars per 's instructions.  Taking    cyclobenzaprine (FLEXERIL) 5 MG tablet Take 2 tablets (10 mg) by mouth nightly as needed for muscle spasms.  Taking As Needed    DULoxetine (CYMBALTA) 30 MG capsule Take 1 capsule (30 mg) by mouth 2 times daily.  4/30/2025 Morning    empagliflozin (JARDIANCE) 25 MG TABS tablet Take 1 tablet (25 mg) by mouth daily.  4/30/2025 Morning    FLUoxetine (PROZAC) 20 MG capsule Take 1 capsule (20 mg) by mouth daily. 5/1/2025: Needs refill Unknown    insulin glargine (LANTUS PEN) 100 UNIT/ML pen Inject 80 Units subcutaneously every morning.  4/26/2025    insulin pen needle (32G X 4 MM) 32G X 4 MM miscellaneous Use as directed by provider. Per insurance coverage  4/26/2025    levETIRAcetam (KEPPRA) 1000 MG tablet Take 1 tablet (1,000 mg) by mouth 2 times daily  4/30/2025 Morning    levETIRAcetam (KEPPRA) 500 MG tablet Take 1 tablet (500 mg) by mouth 2 times daily. Add this to your prescribed 1000 mg dose twice daily to equate to 1500 mg twice daily.  4/30/2025 Morning    lisinopril (ZESTRIL) 40 MG tablet Take 1 tablet (40 mg) by mouth daily.  4/30/2025 Morning    metFORMIN (GLUCOPHAGE) 500 MG tablet Take 1 tablet (500 mg) by mouth 2 times daily (with meals)  4/30/2025 Morning    methadone (DOLOPHINE) 5 MG tablet Take 1 tablet (5 mg) by mouth 2 times daily. 5/1/2025: Needs refill Unknown    methylphenidate (RITALIN) 5 MG tablet Take 1-2 tablets (5-10 mg) by mouth 2 times daily as needed (fatigue). Take second dose by 12 noon,  if it is needed  Past Week    naloxone (NARCAN) 4 MG/0.1ML nasal spray Spray 1 spray (4 mg) into one nostril alternating nostrils as needed for opioid reversal every 2-3 minutes until assistance arrives  Taking As Needed    oxyCODONE IR (ROXICODONE) 10 MG tablet Take 1 tablet (10 mg) by mouth every 3 hours as needed for moderate pain.  Taking As Needed    prochlorperazine (COMPAZINE) 10 MG tablet Take 1 tablet (10 mg) by mouth every 6 hours as needed for nausea or vomiting  Taking As Needed    propranolol (INDERAL) 20 MG tablet Take 1 tablet (20 mg) by mouth 2 times daily  4/30/2025 Morning    rivaroxaban ANTICOAGULANT (XARELTO) 20 MG TABS tablet Take 1 tablet (20 mg) by mouth daily (with dinner)  4/30/2025 Evening    rosuvastatin (CRESTOR) 10 MG tablet Take 1 tablet (10 mg) by mouth daily.  4/30/2025 Morning

## 2025-05-01 NOTE — CONSULTS
[facility]  Neurology Consultation    Patient Name:  Connor Emerson  MRN:  [id number]    :  1980  Date of Service:  May 1, 2025  Primary care provider:  Jessica Mohr      Neurology consultation service was asked to see Connor Emerson by Dr. Loza.    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  Neurology is consulted for further evaluation and management.    Patient reported that since 2 weeks, he has been having 7-8 seizures per day the last for 1 to 2 minutes.  Describes them as right arm shaking with intact awareness.  This morning while he was at work, he started having right arm shaking that spread to his face and he could not move, which prompted him to come to the ED. He did not have any loss of awareness, tongue bites or urinary incontinence.  He mentioned that he has been seizure-free since 1 year and his last generalized tonic-clonic seizure was in July.  He has focal seizures that started in  after he was diagnosed with brain mets from his lung cancer in .    He is prescribed Keppra 1500 mg twice daily by Dr. Moran (neurosurgeon)MRI but only takes once a day.  Per wife, he stopped taking twice a day dose 2 months back because he thought he was fine and had to sleep at night anyways.  Whereas, patient himself told that he did not know that he had to be on twice a day.  He mentioned that now he is getting all his medication from his palliative care doctor.    His keppra levels I<2. He did not take his medication this morning. Patient is also found to have glucose over 700.  Per wife, he has also not been taking care of his insulin. He was loaded with 4.5g  keppra.      ROS  A comprehensive ROS was performed and pertinent findings were included in HPI.     PMH  Past Medical History:   Diagnosis Date    Atypical chest pain 12/02/2013    Cancer (H)     Complication of anesthesia     Diabetes (H)     GERD (gastroesophageal reflux disease) 12/02/2013    History of pulmonary embolism     HTN (hypertension) 05/14/2012    HTN, goal below 140/90 07/02/2013    Insomnia 02/21/2012    Mediastinal lymphadenopathy     Metastasis to brain (H) 2022    Migraine headache 07/02/2013    Migraine with aura, without mention of intractable migraine without mention of status migrainosus     Pneumonia      Past Surgical History:   Procedure Laterality Date    BRONCHOSCOPY RIGID OR FLEXIBLE W/TRANSENDOSCOPIC ENDOBRONCHIAL ULTRASOUND GUIDED Bilateral 1/26/2022    Procedure: Right BRONCHOSCOPY, FIBEROPTIC, endobronchial ultrasound, pleural biopsy;  Surgeon: Dallin Agrawal MD;  Location: [facility] OR    INJECT BLOCK MEDIAL BRANCH CERVICAL/THORACIC/LUMBAR      INSERT CHEST TUBE Right 2/16/2022    Procedure: INSERTION, CATHETER, INTERCOSTAL, FOR DRAINAGE;  Surgeon: Dallin Agrawal MD;  Location: [facility] GI    INSERT CHEST TUBE Right 3/9/2022    Procedure: INSERTION, CATHETER, INTERCOSTAL, FOR DRAINAGE;  Surgeon: Sushila Antonio MD;  Location: [facility] GI    IR CHEST TUBE REMOVAL TUNNELED RIGHT  4/2/2022    OPTICAL TRACKING SYSTEM CRANIOTOMY, EXCISE TUMOR, COMBINED Left 6/28/2022    Procedure: Left stealth craniotomy for tumor resection with motor mapping;  Surgeon: Stephen Moran MD;  Location:  OR    OPTICAL TRACKING SYSTEM CRANIOTOMY, EXCISE TUMOR, COMBINED Right 6/27/2023    Procedure: Right stealth craniotomy and resection of tumor;  Surgeon: Stephen Moran MD;  Location:  OR    ORTHOPEDIC SURGERY      Ganesh. Rotator cuff repair.    PLEUROSCOPY N/A 1/26/2022    Procedure: Pleuroscopy with Pleural Biopsy;  Surgeon: Dallin Agrawal MD;  Location: [facility] OR       Medications   I have personally  "reviewed the patient's medication list.     Allergies  I have personally reviewed the patient's allergy list.       Physical Examination   Vitals: BP (!) 181/110   Pulse 100   Temp 98.2  F (36.8  C) (Oral)   Resp 18   Wt 99.8 kg (220 lb)   SpO2 95%   BMI 32.49 kg/m    General: Lying in bed, NAD  Head: NC/AT  Eyes: no icterus, op pink and moist  Cardiac: RRR. Extremities warm, no edema.   Respiratory: non-labored on RA  GI: S/NT/ND  Skin: No rash or lesion on exposed skin  Psych: Mood pleasant, affect congruent  Neuro:  Mental status: Awake, alert, attentive, oriented to self, time, place, and circumstance. Language is fluent and coherent with intact comprehension of complex commands, naming and repetition. Able to count backwards and forwards, and spell work \"house\" backwards  Cranial nerves: VFF, PERRL, conjugate gaze, EOMI, facial sensation intact, face symmetric, shoulder shrug strong, tongue/uvula midline, no dysarthria.   Motor: Normal bulk and tone. No abnormal movements. 5/5 strength bilaterally in deltoids, biceps, triceps, hand , hip flexors, hip extensors, knee flexion, knee extension, plantarflexion, dorsiflexion.   Reflexes: Normo-reflexic and symmetric biceps, brachioradialis, triceps, patellae, and achilles. Negative Llanes, no clonus, toes down-going.  Sensory: Intact to light touch, pin, vibration, and proprioception in proximal and distal aspects of all 4 extremities   Coordination: FNF and HS without ataxia or dysmetria. Rapid alternating movements intact.   Gait: Deferred    Investigations   I have personally reviewed pertinent labs, tests, and radiological imaging. Discussion of notable findings is included under Impression.     Was patient transferred from outside hospital?   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  Neurology is consulted for further evaluation and management.    Patient is here with focal-aware seizures involving right arm and face around 7-8 spells per day since two weeks in the setting of keppra non-compliance. He is taking 1500mg a day whereas he is prescribed 1500mg BID. His keppra levels <2 but he had not taken his dose this morning and with its half life of 6-8 hours, it would have been washed out since the last dose on 4/30 morning.  He also had glucose over 700 that would have been an inciting factory for the seizures. His neurological exam is unremarkable. He recovered 4.5g keppra and did not have the spell since he is here. We recommend starting extended release for better compliance if its covered by the insurance and patient would prefer that too. But if its not covered, would be ok to continue 1500mg BID.    I reviewed with the patient in detail [place] regulations on seizures and driving. Patient appeared to clearly understand that they prohibited from operating a motor vehicle within 3 months following any seizure or other episode with sudden unconsciousness and that they are required to report any future such seizure to the DMV within 30 days after the event. I also recommended that patient and family review all other activities, and avoid any activities that might lead to self-injury or injury of others.  Such activities include but are not limited to holding babies or young children at heights from which they might be injured if dropped, bathing infants or young children in situations in which they might drown without continuous interactive care by an adult who is fully capable at all times during the bath, operating power cutting or other tools, handling firearms, exposure to heights  from which she might fall, exposure to vessels with hot cooking oil or water, and swimming alone.        Recommendations  -Pharmacy liaison consult for Keppra XR 2500mg daily coverage  -If extended release keppra is covered, recommend taking Keppra XR 25O0mg daily  -If its not covered, would continue Keppra 1500mg BID      Thank you for involving Neurology in the care of Connor Emerson.  Please do not hesitate to call with questions/concerns (consult pager [phone]).      Patient was seen and discussed with Dr. Sierra.      Lizbeth Kimball MD MS  Neurology PGY3   normal...

## 2025-05-01 NOTE — LETTER
formerly [facility] EMERGENCY DEPARTMENT  [address] [address]  Phone: [phone]    May 3, 2025        Connor Emerson  [id number]   [place] [address]        To whom it may concern:    RE: Connor Emerson      Patient was hospitalized at the from 5/1 until 5/3. Patient may resume daily activities and work as tolerated.      Please contact me for questions or concerns.      Sincerely,      Eliezer Garrison M.D., Ph.D.  Internal Medicine/Gastroenterology [facility]

## 2025-05-01 NOTE — CONSULTS
Discharge Pharmacy Test Claim    Pharmacy liaison is unable to locate active pharmacy benefits. Using a pharmacy discount card at [facility], 150 tablets of levetiracetam ER 500mg is $90.43. [facility] offers 90 count bottles for $14.40. Visit the link below for more info regarding [facility].    [email or web address]    Marandadanielle Gifford  [facility] Pharmacy Liaison, M-CHEL  Ph: [phone]  Fax: [phone]  Available on Teams and [employer]  Disclaimer: Pharmacy test claims are estimates and may not reflect final costs. Suggested alternatives aim to be cost-effective and may not be therapeutically equivalent. This consult is informational and does not constitute medical advice. Clinical decisions should be made by qualified healthcare providers.

## 2025-05-02 LAB
ALBUMIN UR-MCNC: 20 MG/DL
AMPHETAMINES UR QL SCN: ABNORMAL
ANION GAP SERPL CALCULATED.3IONS-SCNC: 10 MMOL/L (ref 7–15)
ANION GAP SERPL CALCULATED.3IONS-SCNC: 12 MMOL/L (ref 7–15)
ANION GAP SERPL CALCULATED.3IONS-SCNC: 14 MMOL/L (ref 7–15)
APPEARANCE UR: CLEAR
ATRIAL RATE - MUSE: 93 BPM
B-OH-BUTYR SERPL-SCNC: <0.18 MMOL/L
B-OH-BUTYR SERPL-SCNC: <0.18 MMOL/L
BARBITURATES UR QL SCN: ABNORMAL
BENZODIAZ UR QL SCN: ABNORMAL
BILIRUB UR QL STRIP: NEGATIVE
BUN SERPL-MCNC: 14.3 MG/DL (ref 6–20)
BUN SERPL-MCNC: 17.7 MG/DL (ref 6–20)
BUN SERPL-MCNC: 18.6 MG/DL (ref 6–20)
BZE UR QL SCN: ABNORMAL
CALCIUM SERPL-MCNC: 8.3 MG/DL (ref 8.8–10.4)
CALCIUM SERPL-MCNC: 9.2 MG/DL (ref 8.8–10.4)
CALCIUM SERPL-MCNC: 9.5 MG/DL (ref 8.8–10.4)
CANNABINOIDS UR QL SCN: ABNORMAL
CHLORIDE SERPL-SCNC: 104 MMOL/L (ref 98–107)
CHLORIDE SERPL-SCNC: 106 MMOL/L (ref 98–107)
CHLORIDE SERPL-SCNC: 94 MMOL/L (ref 98–107)
COLOR UR AUTO: YELLOW
CREAT SERPL-MCNC: 0.51 MG/DL (ref 0.67–1.17)
CREAT SERPL-MCNC: 0.55 MG/DL (ref 0.67–1.17)
CREAT SERPL-MCNC: 0.58 MG/DL (ref 0.67–1.17)
DIASTOLIC BLOOD PRESSURE - MUSE: NORMAL MMHG
EGFRCR SERPLBLD CKD-EPI 2021: >90 ML/MIN/1.73M2
FENTANYL UR QL: ABNORMAL
GLUCOSE BLDC GLUCOMTR-MCNC: 150 MG/DL (ref 70–99)
GLUCOSE BLDC GLUCOMTR-MCNC: 152 MG/DL (ref 70–99)
GLUCOSE BLDC GLUCOMTR-MCNC: 205 MG/DL (ref 70–99)
GLUCOSE BLDC GLUCOMTR-MCNC: 206 MG/DL (ref 70–99)
GLUCOSE BLDC GLUCOMTR-MCNC: 219 MG/DL (ref 70–99)
GLUCOSE BLDC GLUCOMTR-MCNC: 234 MG/DL (ref 70–99)
GLUCOSE BLDC GLUCOMTR-MCNC: 243 MG/DL (ref 70–99)
GLUCOSE BLDC GLUCOMTR-MCNC: 245 MG/DL (ref 70–99)
GLUCOSE BLDC GLUCOMTR-MCNC: 261 MG/DL (ref 70–99)
GLUCOSE BLDC GLUCOMTR-MCNC: 266 MG/DL (ref 70–99)
GLUCOSE BLDC GLUCOMTR-MCNC: 310 MG/DL (ref 70–99)
GLUCOSE BLDC GLUCOMTR-MCNC: 320 MG/DL (ref 70–99)
GLUCOSE BLDC GLUCOMTR-MCNC: 349 MG/DL (ref 70–99)
GLUCOSE BLDC GLUCOMTR-MCNC: 480 MG/DL (ref 70–99)
GLUCOSE SERPL-MCNC: 172 MG/DL (ref 70–99)
GLUCOSE SERPL-MCNC: 177 MG/DL (ref 70–99)
GLUCOSE SERPL-MCNC: 758 MG/DL (ref 70–99)
GLUCOSE UR STRIP-MCNC: >=1000 MG/DL
HCO3 SERPL-SCNC: 23 MMOL/L (ref 22–29)
HCO3 SERPL-SCNC: 24 MMOL/L (ref 22–29)
HCO3 SERPL-SCNC: 25 MMOL/L (ref 22–29)
HGB UR QL STRIP: NEGATIVE
INTERPRETATION ECG - MUSE: NORMAL
KETONES UR STRIP-MCNC: NEGATIVE MG/DL
LEUKOCYTE ESTERASE UR QL STRIP: NEGATIVE
MUCOUS THREADS #/AREA URNS LPF: PRESENT /LPF
NITRATE UR QL: NEGATIVE
OPIATES UR QL SCN: ABNORMAL
P AXIS - MUSE: 33 DEGREES
PCP QUAL URINE (ROCHE): ABNORMAL
PH UR STRIP: 5.5 [PH] (ref 5–7)
PHOSPHATE SERPL-MCNC: 2.4 MG/DL (ref 2.5–4.5)
PHOSPHATE SERPL-MCNC: 2.8 MG/DL (ref 2.5–4.5)
PHOSPHATE SERPL-MCNC: 3.5 MG/DL (ref 2.5–4.5)
PHOSPHATE SERPL-MCNC: 3.6 MG/DL (ref 2.5–4.5)
POTASSIUM SERPL-SCNC: 3.2 MMOL/L (ref 3.4–5.3)
POTASSIUM SERPL-SCNC: 3.8 MMOL/L (ref 3.4–5.3)
POTASSIUM SERPL-SCNC: 3.9 MMOL/L (ref 3.4–5.3)
PR INTERVAL - MUSE: 146 MS
QRS DURATION - MUSE: 82 MS
QT - MUSE: 362 MS
QTC - MUSE: 450 MS
R AXIS - MUSE: 24 DEGREES
RBC URINE: 1 /HPF
SODIUM SERPL-SCNC: 131 MMOL/L (ref 135–145)
SODIUM SERPL-SCNC: 140 MMOL/L (ref 135–145)
SODIUM SERPL-SCNC: 141 MMOL/L (ref 135–145)
SP GR UR STRIP: 1.03 (ref 1–1.03)
SQUAMOUS EPITHELIAL: 3 /HPF
SYSTOLIC BLOOD PRESSURE - MUSE: NORMAL MMHG
T AXIS - MUSE: 44 DEGREES
UROBILINOGEN UR STRIP-MCNC: NORMAL MG/DL
VENTRICULAR RATE- MUSE: 93 BPM
WBC URINE: 5 /HPF

## 2025-05-02 PROCEDURE — 84100 ASSAY OF PHOSPHORUS: CPT | Performed by: STUDENT IN AN ORGANIZED HEALTH CARE EDUCATION/TRAINING PROGRAM

## 2025-05-02 PROCEDURE — 87086 URINE CULTURE/COLONY COUNT: CPT | Performed by: EMERGENCY MEDICINE

## 2025-05-02 PROCEDURE — 99254 IP/OBS CNSLTJ NEW/EST MOD 60: CPT | Performed by: CLINICAL NURSE SPECIALIST

## 2025-05-02 PROCEDURE — 250N000009 HC RX 250: Performed by: PEDIATRICS

## 2025-05-02 PROCEDURE — 120N000002 HC R&B MED SURG/OB UMMC

## 2025-05-02 PROCEDURE — 250N000009 HC RX 250: Performed by: EMERGENCY MEDICINE

## 2025-05-02 PROCEDURE — 80048 BASIC METABOLIC PNL TOTAL CA: CPT | Performed by: STUDENT IN AN ORGANIZED HEALTH CARE EDUCATION/TRAINING PROGRAM

## 2025-05-02 PROCEDURE — 36415 COLL VENOUS BLD VENIPUNCTURE: CPT | Performed by: PEDIATRICS

## 2025-05-02 PROCEDURE — 250N000013 HC RX MED GY IP 250 OP 250 PS 637

## 2025-05-02 PROCEDURE — 81001 URINALYSIS AUTO W/SCOPE: CPT | Performed by: EMERGENCY MEDICINE

## 2025-05-02 PROCEDURE — 250N000013 HC RX MED GY IP 250 OP 250 PS 637: Performed by: STUDENT IN AN ORGANIZED HEALTH CARE EDUCATION/TRAINING PROGRAM

## 2025-05-02 PROCEDURE — 82010 KETONE BODYS QUAN: CPT | Performed by: STUDENT IN AN ORGANIZED HEALTH CARE EDUCATION/TRAINING PROGRAM

## 2025-05-02 PROCEDURE — 258N000001 HC RX 258: Performed by: STUDENT IN AN ORGANIZED HEALTH CARE EDUCATION/TRAINING PROGRAM

## 2025-05-02 PROCEDURE — 99233 SBSQ HOSP IP/OBS HIGH 50: CPT | Performed by: PEDIATRICS

## 2025-05-02 PROCEDURE — 99418 PROLNG IP/OBS E/M EA 15 MIN: CPT | Performed by: NURSE PRACTITIONER

## 2025-05-02 PROCEDURE — 82962 GLUCOSE BLOOD TEST: CPT

## 2025-05-02 PROCEDURE — 99255 IP/OBS CONSLTJ NEW/EST HI 80: CPT | Performed by: NURSE PRACTITIONER

## 2025-05-02 PROCEDURE — 80307 DRUG TEST PRSMV CHEM ANLYZR: CPT | Performed by: EMERGENCY MEDICINE

## 2025-05-02 PROCEDURE — 84100 ASSAY OF PHOSPHORUS: CPT | Performed by: PEDIATRICS

## 2025-05-02 PROCEDURE — 36415 COLL VENOUS BLD VENIPUNCTURE: CPT | Performed by: STUDENT IN AN ORGANIZED HEALTH CARE EDUCATION/TRAINING PROGRAM

## 2025-05-02 PROCEDURE — 99232 SBSQ HOSP IP/OBS MODERATE 35: CPT | Mod: GC | Performed by: PSYCHIATRY & NEUROLOGY

## 2025-05-02 PROCEDURE — 250N000012 HC RX MED GY IP 250 OP 636 PS 637: Performed by: NURSE PRACTITIONER

## 2025-05-02 PROCEDURE — 258N000001 HC RX 258: Performed by: PEDIATRICS

## 2025-05-02 RX ORDER — SODIUM CHLORIDE, SODIUM GLUCONATE, SODIUM ACETATE, POTASSIUM CHLORIDE AND MAGNESIUM CHLORIDE 526; 502; 368; 37; 30 MG/100ML; MG/100ML; MG/100ML; MG/100ML; MG/100ML
INJECTION, SOLUTION INTRAVENOUS CONTINUOUS
Status: DISCONTINUED | OUTPATIENT
Start: 2025-05-02 | End: 2025-05-03 | Stop reason: HOSPADM

## 2025-05-02 RX ORDER — DEXTROSE MONOHYDRATE, SODIUM CHLORIDE, SODIUM LACTATE, POTASSIUM CHLORIDE, CALCIUM CHLORIDE 5; 600; 310; 179; 20 G/100ML; MG/100ML; MG/100ML; MG/100ML; MG/100ML
INJECTION, SOLUTION INTRAVENOUS CONTINUOUS
Status: DISCONTINUED | OUTPATIENT
Start: 2025-05-02 | End: 2025-05-02

## 2025-05-02 RX ORDER — OXYCODONE HYDROCHLORIDE 10 MG/1
20 TABLET ORAL EVERY 6 HOURS PRN
Status: DISCONTINUED | OUTPATIENT
Start: 2025-05-02 | End: 2025-05-03 | Stop reason: HOSPADM

## 2025-05-02 RX ORDER — LEVETIRACETAM 500 MG/1
1500 TABLET ORAL 2 TIMES DAILY
Status: DISCONTINUED | OUTPATIENT
Start: 2025-05-02 | End: 2025-05-03 | Stop reason: HOSPADM

## 2025-05-02 RX ORDER — OXYCODONE HYDROCHLORIDE 10 MG/1
10 TABLET ORAL EVERY 6 HOURS PRN
Status: DISCONTINUED | OUTPATIENT
Start: 2025-05-02 | End: 2025-05-03 | Stop reason: HOSPADM

## 2025-05-02 RX ADMIN — DULOXETINE HYDROCHLORIDE 30 MG: 30 CAPSULE, DELAYED RELEASE ORAL at 09:00

## 2025-05-02 RX ADMIN — LEVETIRACETAM 1500 MG: 500 TABLET, FILM COATED ORAL at 08:26

## 2025-05-02 RX ADMIN — LISINOPRIL 40 MG: 40 TABLET ORAL at 09:00

## 2025-05-02 RX ADMIN — PROPRANOLOL HYDROCHLORIDE 20 MG: 20 TABLET ORAL at 09:00

## 2025-05-02 RX ADMIN — PROPRANOLOL HYDROCHLORIDE 20 MG: 20 TABLET ORAL at 19:20

## 2025-05-02 RX ADMIN — RIVAROXABAN 20 MG: 20 TABLET, FILM COATED ORAL at 19:20

## 2025-05-02 RX ADMIN — METHADONE HYDROCHLORIDE 5 MG: 5 TABLET ORAL at 08:27

## 2025-05-02 RX ADMIN — INSULIN ASPART 16 UNITS: 100 INJECTION, SOLUTION INTRAVENOUS; SUBCUTANEOUS at 18:00

## 2025-05-02 RX ADMIN — DULOXETINE HYDROCHLORIDE 30 MG: 30 CAPSULE, DELAYED RELEASE ORAL at 19:20

## 2025-05-02 RX ADMIN — SODIUM CHLORIDE, SODIUM GLUCONATE, SODIUM ACETATE, POTASSIUM CHLORIDE AND MAGNESIUM CHLORIDE: 526; 502; 368; 37; 30 INJECTION, SOLUTION INTRAVENOUS at 21:10

## 2025-05-02 RX ADMIN — INSULIN HUMAN 0.5 UNITS/HR: 1 INJECTION, SOLUTION INTRAVENOUS at 01:04

## 2025-05-02 RX ADMIN — ROSUVASTATIN CALCIUM 10 MG: 10 TABLET, FILM COATED ORAL at 08:26

## 2025-05-02 RX ADMIN — AMLODIPINE BESYLATE 10 MG: 5 TABLET ORAL at 08:26

## 2025-05-02 RX ADMIN — DEXTROSE MONOHYDRATE, SODIUM CHLORIDE, SODIUM LACTATE, POTASSIUM CHLORIDE, CALCIUM CHLORIDE: 5; 600; 310; 179; 20 INJECTION, SOLUTION INTRAVENOUS at 08:59

## 2025-05-02 RX ADMIN — LEVETIRACETAM 1500 MG: 500 TABLET, FILM COATED ORAL at 19:20

## 2025-05-02 RX ADMIN — INSULIN ASPART 14 UNITS: 100 INJECTION, SOLUTION INTRAVENOUS; SUBCUTANEOUS at 17:36

## 2025-05-02 RX ADMIN — DEXTROSE MONOHYDRATE, SODIUM CHLORIDE, SODIUM LACTATE, POTASSIUM CHLORIDE, CALCIUM CHLORIDE: 5; 600; 310; 179; 20 INJECTION, SOLUTION INTRAVENOUS at 01:04

## 2025-05-02 ASSESSMENT — ACTIVITIES OF DAILY LIVING (ADL)
ADLS_ACUITY_SCORE: 56

## 2025-05-02 NOTE — CONSULTS
"  Inpatient Diabetes Management Service : New Consult Note     Patient: Connor Emerson   YOB: 1980    MRN: [id number]     Date of Consult : 05/02/2025   Date of Admission: 5/1/2025     Reason for Consult: \"DKA/HHS\"   Consult Requestor: Eliezer Garrison MD PhD            History of Present Illness:     HPI:  Connor Emerson is a 44-year-old male with PMH of type 2 diabetes, HTN, metastatic non-small cell lung carcinoma w/ metastases to brain who is s/p radiation c/b radiation-induced brain necrosis s/p craniotomy who is presenting 5/1 with breakthrough seizures while on Keppra. Additionally, he has significant hyperglycemia concerning for HHS versus DKA. Presenting labs: , mg/dL  pH: 7.35, no sig ketosis: BhB: 0.46   Osmolality 312 (at 2:38 pm) [slightly under threshold for HHS however taken later than presenting labs    IDS consulted to assist with glycemic management and optimization while inpatient and when applicable, recommendations for transition home.    History obtained via the patient, chart review and discussion with primary team.     Additional Historian:  none     Patient is known to the Inpatient Diabetes Service from past admission(s)     The patient has known Type II DM which was diagnosed in 2015 following symptomatic presentation of polydipsia to his PCP office which resulted in A1c of 8.6%, he was started on Metformin at that time. He had intermittent with his compliance with metformin.   Review of office notes from 5/12/2020 reveal he again presented with sxs of hyperglycemia - dry mouth, urinary frequency and had not been taking his metformin.  A1c was 11.9%.  He was re-started on his Metformin and started on a low dose of Lantus 15 unit(s) daily and that time.    When seen by IDS in 2023, he had presented in mild HHS[elevated BG, bicarb 20, AG 17, ketones 1.8] following variable compliance with medications at that time and new diagnosis of metastatic non-small cell lung " carcinoma with brain metastases     He is typically managed outpatient utilizing jardiance 25 mg, Lantus 80 unit(s) daily, metformin 500 mg BID, does not use short acting with any regularity.     BG rarely checked with unknown patterns of hypoglycemia and hyperglycemia.    Leading up to this admission, the patient reports not having his lantus for about 1 week. He takes his metformin and jardiance - does not recall doses. He does not take short-acting insulin with meals or correction with regularity. Did end up taking it more over the past week when he was out of lantus. Does not use CGM and does not check BG very often - thinks maybe 1-2 times per month. Denies lows.     His wife did go to get Lantus and paid out of pocket for it and arrived back home about the same time the ambulance did.     Since admission, he has been on IV insulin + PO + dextrose 5% at 100 ml/hr  Ranges 2-12 unit(s) per hour.      Current inpatient regimen:  IV insulin drip     ELOS> 2 days    DM specific Labs on Admission if contributory, otherwise see labs below:    Presenting Glucose: 717 mg/dL  pH: 7.35  Renal function: Creatinine (0.58), eGFR (>90)    BMP: Na (131), K (3.9) Bicarb (23), Anion Gap (14)   BhB: 0.46   Osmolality 312 (at 2:38 pm) [slightly under threshold for HHS however taken later than presenting labs]  Hgb: 16.4   A1c: 15.3     No UA        Inpatient Glucose Trends:             Diabetes History:   Diabetes Type and Duration:   Type 2 Diabetes   9/29/2015 presented with A1c of 8.6%    Glutamic acid <5 9/2015  C peptide 6.4 9/2015     Zinc transporter 8 antibody, islet antibody, insulin antibody, not available on epic search     PTA Medication Regimen:   Lantus 80 unit(s) in AM  Novolog meal/correction - does not take with regularity   Jardiance 25 mg in AM  Metformin 500 mg BID    Missing doses?: yes   Historical Diabetes Medications: see HPI    Glucose monitoring device/frequency/trends: does not use CGM. Checks BG very  infrequently - BG usually high  Hypoglycemia PTA:   - Frequency: 1-2 times per month  - Severity: denies history of severe unconscious lows   - Awareness:  intact    - Treatment: eats     Outpatient Diabetes Provider: Dr. Nelly Sherwood of [facility]  Formal Diabetes Education/Educator: no currently     ------------------------  Complications:  no peripheral neuropathy, no retinopathy (last eye exam: unk), + nephropathy, hx of microalbuminuria, no gastroparesis, + macrovascular disease      Most recent A1c: 15.3% on 5/1    Hemoglobin at time of last A1c: 16.4  RBC transfusion in past 3 months:  none      History of DKA: [facility]       Safety Plan:   - Glucagon: no, glucose tabs in past    - Ketone Strips: no  Medic Alert if Type 1: no    Diet/Lifestyle/Living Situation: eats regular times     Ability to [place] Prescribed Regimen:  forgetful - wife helps    Food/Housing Insecurity: no          Medications Impacting Glycemia:    Steroids: no  D5W containing solutions/medications: no  Other medications/factors impacting glucose:  was out of lantus at least x1 week          Diet/Nutrition:    Orders Placed This Encounter      Regular Diet Adult     Supplements:  no  TF: no  TPN: no          Review of Systems:    CC: denies all       Endocrine: ++  polyuria, polydipsia  - when BG elevated - this has resolved          Past Medical History:       Past Medical History:   Diagnosis Date    Atypical chest pain 12/02/2013    Cancer (H)     Complication of anesthesia     Diabetes (H)     GERD (gastroesophageal reflux disease) 12/02/2013    History of pulmonary embolism     HTN (hypertension) 05/14/2012    HTN, goal below 140/90 07/02/2013    Insomnia 02/21/2012    Mediastinal lymphadenopathy     Metastasis to brain (H) 2022    Migraine headache 07/02/2013    Migraine with aura, without mention of intractable migraine without mention of status migrainosus     Pneumonia              Past Surgical History:      Past Surgical  History:   Procedure Laterality Date    BRONCHOSCOPY RIGID OR FLEXIBLE W/TRANSENDOSCOPIC ENDOBRONCHIAL ULTRASOUND GUIDED Bilateral 1/26/2022    Procedure: Right BRONCHOSCOPY, FIBEROPTIC, endobronchial ultrasound, pleural biopsy;  Surgeon: Dallin Agrawal MD;  Location: [facility] OR    INJECT BLOCK MEDIAL BRANCH CERVICAL/THORACIC/LUMBAR      INSERT CHEST TUBE Right 2/16/2022    Procedure: INSERTION, CATHETER, INTERCOSTAL, FOR DRAINAGE;  Surgeon: Dallin Agrawal MD;  Location: [facility] GI    INSERT CHEST TUBE Right 3/9/2022    Procedure: INSERTION, CATHETER, INTERCOSTAL, FOR DRAINAGE;  Surgeon: Sushila Antonio MD;  Location: [facility] GI    IR CHEST TUBE REMOVAL TUNNELED RIGHT  4/2/2022    OPTICAL TRACKING SYSTEM CRANIOTOMY, EXCISE TUMOR, COMBINED Left 6/28/2022    Procedure: Left stealth craniotomy for tumor resection with motor mapping;  Surgeon: Stephen Moran MD;  Location:  OR    OPTICAL TRACKING SYSTEM CRANIOTOMY, EXCISE TUMOR, COMBINED Right 6/27/2023    Procedure: Right stealth craniotomy and resection of tumor;  Surgeon: Stephen Moran MD;  Location:  OR    ORTHOPEDIC SURGERY      Ganesh. Rotator cuff repair.    PLEUROSCOPY N/A 1/26/2022    Procedure: Pleuroscopy with Pleural Biopsy;  Surgeon: Dallin Agrawal MD;  Location: [facility] OR             Social History:      Social History     Tobacco Use    Smoking status: Never     Passive exposure: Never    Smokeless tobacco: Never   Substance Use Topics    Alcohol use: Yes     Alcohol/week: 0.0 standard drinks of alcohol     Comment: social        History   Sexual Activity    Sexual activity: Yes    Partners: Female      Tobacco: as above   Etoh: as above/not currently     Other Substance: not currently    Marital Status:     Lives in [place]         Family History:    Reviewed.    Family History of Diabetes:     Family History   Problem Relation Age of Onset    Unknown/Adopted Mother     Uterine Cancer Mother     Unknown/Adopted Father     Unknown/Adopted  Maternal Grandmother     Unknown/Adopted Maternal Grandfather     Stomach Cancer Maternal Grandfather     Unknown/Adopted Paternal Grandmother     Unknown/Adopted Paternal Grandfather     Melanoma Maternal Uncle              Physical Exam:    /86   Pulse 70   Temp 97.9  F (36.6  C) (Oral)   Resp 17   Wt 99.8 kg (220 lb)   SpO2 93%   BMI 32.49 kg/m     General:  stable appearing, in no acute distress. Resting on bed   HEENT:  NC/AT. MMM, sclera not injected, hearing intact.  Lungs:  unremarkable, no new cough, no SOB.  ABD:  rounded.  Skin:  warm and dry, no obvious lesions.  Feet:    warm with / without evidence of wounds, corns, calluses, or vascular compromise, pulses +,  + CMS intact / decreased sensation / numbness present.    MSK:   moving all extremities.  Lymp:   no LE edema.  Mental Status:  Alert and oriented x3.  Psych:   Cooperative, good eye contact, full/appropriate affect.         Laboratory Data:      Lab Results   Component Value Date    A1C 15.3 (H) 05/01/2025    A1C 11.5 (H) 10/04/2024    A1C 10.6 (H) 06/19/2024    A1C 6.7 (H) 12/27/2023    A1C 8.3 (H) 04/06/2023    A1C 11.9 (H) 05/12/2020    A1C 6.3 (H) 08/10/2018    A1C 6.5 (H) 11/27/2017    A1C 6.4 (H) 06/29/2017    A1C 6.5 (H) 02/13/2017     Recent Labs   Lab Test 05/01/25  1550   WBC 9.6   RBC 5.15   HGB 15.7   HCT 45.3   MCV 88   MCH 30.5   MCHC 34.7   RDW 12.0        Recent Labs   Lab Test 05/02/25  0526 05/02/25  0426 05/02/25  0201 05/02/25  0200 05/01/25  2300 05/01/25  2212   NA  --   --   --  140  --  141   POTASSIUM  --   --   --  3.8  --  3.4   CHLORIDE  --   --   --  104  --  103   CO2  --   --   --  24  --  27   ANIONGAP  --   --   --  12  --  11   * 243*   < > 177*   < > 239*   BUN  --   --   --  17.7  --  14.0   CR  --   --   --  0.55*  --  0.73   TORY  --   --   --  8.3*  --  9.2    < > = values in this interval not displayed.     Recent Labs   Lab Test 05/01/25  1329   PROTTOTAL 6.2*   ALBUMIN 4.0    BILITOTAL 0.2   ALKPHOS 146   AST 16   ALT 21            Assessment and Recommendations:       Assessment:     Type II Diabetes Mellitus, c/b nephropathy, insulin resistance and mild HHS [ , mg/dL, pH: 7.35, no sig ketosis: BhB: 0.46, serum Osmolality 312 (at 2:38 pm) [slightly under threshold for HHS however taken later than presenting labs].  Sub-optimal control  (A1c 15.3 %)   Acute hyperglycemia 2/2, resolving   Seizure d/o currently NPO  BMI:  32     Plan/Recommendations:      - Continue IV insulin drip Non-DKA protocol - will transition off drip this AM.  Discontinued the dextrose fluids, reduce drip to Alg 2  - Lantus 80 unit(s) at 11:00, reduce to Alg 2 and 2 hours later STOP drip  - Novolog Meal Coverage: 1 units per 6 g CHO, TID AC and PRN with snacks/supplements    Addm: increase to 1:5 g cho to start at 1700   - Novolog Correction Scale: High resistance (ISF: 25)  TID AC / HS / 0200 - to start at 1300  - BG monitoring: Every hour on insulin drip, then TID AC / HS and 0200 to start at 1300   - HOLD PTA: medications: Jardiance/Metformin  - Hypoglycemia protocol    - Carb counting protocol recommended  - IDS to continue to follow.  Please do not hesitate to contact the team with questions/concerns 0700 - 1700 hrs.    Discussion:  Labs on presentation most consistent with mild HHS - see above.  In presence of SGLT-2, and late draw of serum Os - is likely he did have a higher Os on presentation - did get fluids.     Largely resolved now, he is tolerating intake.  He has lantus insulin now.      Will transition off - all orders updated.         Tentative Discharge Planning - to be finalized at/near day of discharge:    Medications: TBD, PTA Latnus, does not use short-acting - resume Metformin at home. TBD jardiance.     Test Claims: 5/2, see pharm liaison notes for details    Education: none.  Final needs to be assessed closer to discharge.    Outpatient Follow-up:  recommend w/ their establish PCP  Nelly      Thank you for this consult. IDS will continue to follow.        Shannon Eldridge, APRN-CNP, BC-ADM   Inpatient Diabetes Service  Available on [employer] - when on duty.     To contact Inpatient Diabetes Service:  7 AM - 5 PM: Page the IDS JULIO following the patient that day (see filed or incomplete progress notes/consult notes under Endocrinology)    OR if uncertain of provider assignment: page job code [phone]    5 PM - 7 AM: First call after hours is to primary service.    For urgent after-hours questions, page job code for on call fellow: [phone]     I spent a total of 100 minutes on the date of the encounter doing prep/post-work, chart review, history and exam, documentation and further activities per the note including lab review, multidisciplinary communication, counseling the patient and/or coordinating care regarding acute hyper/hypoglycemic management, as well as discharge management and planning/communication.  See note for details.      Please notify Inpatient Diabetes Service if changes are planned to steroids, nutrition, TPN/TF and anticipated procedures requiring prolonged NPO status.

## 2025-05-02 NOTE — PROGRESS NOTES
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 Today he had 3 seizures.  States he has been taking 1000 mg of Keppra per day rather than twice per day which he has been doing for about a month previously was taking 500 mg once per day.  Patient states that his seizures primarily started in his right hand which caused him to lock up.  Will cause him to seek medical attention this time around was the fact that he had uncontrollable eye twitching which is different from his usual right hand seizures. He is afebrile with systolics to 180s tachycardic to 100 breathing well on room air.  Physical exam is without any neurological focal deficits.  Notable labs are as follows sodium 131, VBG, white count within normal limits, EKG without abnormalities, Keppra level is undetectable. Patient was evaluated by neurology and loaded with Keppra.  They do not feel that advanced imaging is necessary at this time given that the patient has returned to baseline.  - s/p keppra load with 4500 mg  - Keppra 1500 BID for now, will discuss Keppra XR with patient  [facility] offers 90 count bottles for $14.40. Visit the link below for more info regarding [facility] pharmacy.   [email or web address]-[email or web address]    - Neurology following              - If with breakthrough seizures after Keppra loading call Neuro for  advanced imaging vs EEG.     # Insulin-requiring type 2 diabetes mellitus  # Hyperosmolar hyperglycemic state  He additionally states he ran out of insulin about a week ago and he has been urinating frequently has not been able to keep up with his hydration. Glucose initially 758 on admission. Patient states he has insurance issues with poor access to medications.  - Endocrinology consulted, appreciate recommendations  - Insulin gtt stopped  - s/p 1000 ml bolus and now on PO only, if poor PO today, low threshold to resume IVF  - strict I and Os  - resumed PTA glargine 80 units  - ssi per endo, appr recs  - HOLD PTA Empaglaflozin  - HOLD PTA Metformin        # Hypertension  - Continue PTA Amlodipine 10 mg  - Continue PTA lisinopril 40 mg  - Continue PTA propranolol 20 mg    #HLD- Continue PTA Rosuvastatin 10 mg    # Hx of malignancy provoked PE- Continue PTA rivaroxiabn 20 mg daily        # Cancer related pain  # Mood  - Palliative consult for management of chronic pain as patient has established care. Also unsure if he is compliant with methadone  - Methadone 5 mg BID stopped, no insurance and therefore cannot afford  - Oxycodone PRN  - Duloxetine 30 mg   - urine drug screen ordered by dr barba, not clear what the significance of these results are or what utility it may have, given the well described false + and false - in the literature would disregard           Diet: Regular Diet Adult    DVT Prophylaxis: DOAC  Heart Catheter: Not present  Lines: None     Cardiac Monitoring: ACTIVE order. Indication: Syncope- low cardiac risk (24 hours)  Code Status: Full Code      Clinically Significant Risk Factors        # Hypokalemia: Lowest K = 3.2 mmol/L in last 2 days, will replace as needed  # Hyponatremia: Lowest Na = 131 mmol/L in last 2 days, will monitor as appropriate  # Hypochloremia: Lowest Cl = 94 mmol/L in last 2 days, will monitor as appropriate  # Hypocalcemia: Lowest Ca = 8.3 mg/dL in last 2 days, will monitor  and replace as appropriate         # Hypertension: Noted on problem list           # DMII: A1C = 15.3 % (Ref range: <5.7 %) within past 6 months, PRESENT ON ADMISSION      # Financial/Environmental Concerns:           Social Drivers of Health    Depression: Not at risk (7/30/2024)    PHQ-2     PHQ-2 Score: 2   Recent Concern: Depression - At risk (6/19/2024)    PHQ-2     PHQ-2 Score: 6          Disposition Plan     Medically Ready for Discharge: Anticipated in 2-4 Days             Rohit Marley MD  Medicine Service, [facility] TEAM [address] [place] [address]  Securely message with [employer] (more info)  Text page via [employer] Paging/Directory   See signed in provider for up to date coverage information  ______________________________________________________________________    Interval History   ON BONITA  Took PO, did do this while on insulin gtt, gap remains closed and lytes within acceptable range(s)  BG still high but better  Feels ok  No szrs that he knows of today  No NVD no CP no SOB no FCS     Physical Exam   Vital Signs: Temp: 98  F (36.7  C) Temp src: Oral BP: 106/77 Pulse: 82   Resp: 18 SpO2: 98 % O2 Device: None (Room air)    Weight: 220 lbs 0 oz    EXAM  General: well appearing adult man lying up in bed in NAD,   Head: NC, AT  Eye: symm gaze, anicteric sclerae  ENT: patent nares wo drainage/epistaxis, MMM  Pulm: CTAB, comfortable WOB on RA  CV: normal rate, regular rhythm,   GI: soft, NTND  Neuro: awake, alert, hearing speech and phonation, intact grossly           Medical Decision Making       Tests ORDERED & REVIEWED in the past 24 hours:  - See lab/imaging results included in the data section of the note  - BMP  - CBC  Medical complexity over the past 24 hours:  - Parenteral (IV) CONTROLLED SUBSTANCES ordered  - Prescription DRUG MANAGEMENT performed      Data     I have personally reviewed the following data over the past 24 hrs:    N/A  \   N/A   / N/A     141 106 18.6 /  349  (H)   3.2 (L) 25 0.51 (L) \       Imaging results reviewed over the past 24 hrs:   No results found for this or any previous visit (from the past 24 hours).

## 2025-05-02 NOTE — CONSULTS
Discharge Pharmacy Test Claim    Patient does not have active pharmacy benefits. Expected cost of insulin listed below.    Test Claim Price Note   lantus solostar pens 35.00 urgent need voucher available,  $35 insulin card available   tresiba flextouch pens 652.45 urgent need voucher available,  $35 insulin card available     Resources  Contact pharmacy liaison to apply urgent emergency insulin vouchers on discharge.     Emergency Voucher links:  Lantus   [email or web address]/#      Tresiba   https://[email or web address].[employer]/diabetes/help-with-costs/help-with-insulin-costs/immediate-supply.html     $35 per month insulin card links:  $35 vouchers for novolog, tresiba, or levemir:   https://www.novoYieldr.[employer]/diabetes/help-with-costs/help-with-insulin-costs/myinsulinrx.html      $35 vouchers for lantus or toujeo.   https://www.FarFariaupfordiabetes.com/sanofidiabetes-savings-program       [place] Ins[place]   [place]://[place]boards/assets/ISNP-Information-Sheets-for-Patients%2010.21.2021_tcm21-162309.pdf     Maranda Gifford  [facility] Pharmacy Liaison, [place]  Ph: [phone]  Fax: [phone]  Available on Teams and Kvng  Disclaimer: Pharmacy test claims are estimates and may not reflect final costs. Suggested alternatives aim to be cost-effective and may not be therapeutically equivalent. This consult is informational and does not constitute medical advice. Clinical decisions should be made by qualified healthcare providers.

## 2025-05-02 NOTE — PROGRESS NOTES
[facility]  Neurology Consultation    Patient Name:  Connor Emerson  MRN:  [id number]    :  1980  Primary care provider:  Jessica Mohr          Interval Hx and subjective:  Patient did not receive his keppra maintenance dose yesterday. I ordered this morning. There are no reports of ongoing seizure activity. He denies any new symptoms this morning.       History of Present Illness:   44 year old male with history of stage IV NSCLC with mets to brain s/p radiation c/b radiation-induced brain necrosis s/p craniotomy with resection, seizures on Keppra, provoked PE on AC, migraine headaches, HTN, presented to the ED with increasing focal seizures over 2 weeks.  Neurology is consulted for further evaluation and management.    Patient reported that since 2 weeks, he has been having 7-8 seizures per day the last for 1 to 2 minutes.  Describes them as right arm shaking with intact awareness.  This morning while he was at work, he started having right arm shaking that spread to his face and he could not move, which prompted him to come to the ED. He did not have any loss of awareness, tongue bites or urinary incontinence.  He mentioned that he has been seizure-free since 1 year and his last generalized tonic-clonic seizure was in July.  He has focal seizures that started in  after he was diagnosed with brain mets from his lung cancer in .    He is prescribed Keppra 1500 mg twice daily by Dr. Moran (neurosurgeon)MRI but only takes once a day.  Per wife, he stopped taking twice a day dose 2 months back because he thought he was fine and had to sleep at night anyways.  Whereas, patient himself told that he did not know that he had to be on twice a day.  He mentioned that now he is getting all his medication from his palliative care doctor.    His keppra levels I<2. He did not take his medication this morning. Patient is also found to have glucose over 700.  Per wife, he has  also not been taking care of his insulin. He was loaded with 4.5g keppra.      ROS  A comprehensive ROS was performed and pertinent findings were included in HPI.     PMH  Past Medical History:   Diagnosis Date    Atypical chest pain 12/02/2013    Cancer (H)     Complication of anesthesia     Diabetes (H)     GERD (gastroesophageal reflux disease) 12/02/2013    History of pulmonary embolism     HTN (hypertension) 05/14/2012    HTN, goal below 140/90 07/02/2013    Insomnia 02/21/2012    Mediastinal lymphadenopathy     Metastasis to brain (H) 2022    Migraine headache 07/02/2013    Migraine with aura, without mention of intractable migraine without mention of status migrainosus     Pneumonia      Past Surgical History:   Procedure Laterality Date    BRONCHOSCOPY RIGID OR FLEXIBLE W/TRANSENDOSCOPIC ENDOBRONCHIAL ULTRASOUND GUIDED Bilateral 1/26/2022    Procedure: Right BRONCHOSCOPY, FIBEROPTIC, endobronchial ultrasound, pleural biopsy;  Surgeon: Dallin Agrawal MD;  Location: [facility] OR    INJECT BLOCK MEDIAL BRANCH CERVICAL/THORACIC/LUMBAR      INSERT CHEST TUBE Right 2/16/2022    Procedure: INSERTION, CATHETER, INTERCOSTAL, FOR DRAINAGE;  Surgeon: Dallin Agrawal MD;  Location: [facility] GI    INSERT CHEST TUBE Right 3/9/2022    Procedure: INSERTION, CATHETER, INTERCOSTAL, FOR DRAINAGE;  Surgeon: Sushila Antonio MD;  Location: [facility] GI    IR CHEST TUBE REMOVAL TUNNELED RIGHT  4/2/2022    OPTICAL TRACKING SYSTEM CRANIOTOMY, EXCISE TUMOR, COMBINED Left 6/28/2022    Procedure: Left stealth craniotomy for tumor resection with motor mapping;  Surgeon: Stephen Moran MD;  Location:  OR    OPTICAL TRACKING SYSTEM CRANIOTOMY, EXCISE TUMOR, COMBINED Right 6/27/2023    Procedure: Right stealth craniotomy and resection of tumor;  Surgeon: Stephen Moran MD;  Location:  OR    ORTHOPEDIC SURGERY      Ganesh. Rotator cuff repair.    PLEUROSCOPY N/A 1/26/2022    Procedure: Pleuroscopy with Pleural Biopsy;  Surgeon: Dallin Agrawal  "MD;  Location: [facility] OR       Medications   I have personally reviewed the patient's medication list.     Allergies  I have personally reviewed the patient's allergy list.       Physical Examination   Vitals: BP 99/66   Pulse 61   Temp 98.3  F (36.8  C) (Oral)   Resp 17   Wt 99.8 kg (220 lb)   SpO2 96%   BMI 32.49 kg/m    General: Lying in bed, NAD  Head: NC/AT  Eyes: no icterus, op pink and moist  Cardiac: RRR. Extremities warm, no edema.   Respiratory: non-labored on RA  GI: S/NT/ND  Skin: No rash or lesion on exposed skin  Psych: Mood pleasant, affect congruent  Neuro:  Mental status: Awake, alert, attentive, oriented to self, time, place, and circumstance. Language is fluent and coherent with intact comprehension of complex commands, naming and repetition. Able to count backwards and forwards, and spell work \"house\" backwards  Cranial nerves: VFF, PERRL, conjugate gaze, EOMI, facial sensation intact, face symmetric, shoulder shrug strong, tongue/uvula midline, no dysarthria.   Motor: Normal bulk and tone. No abnormal movements. 5/5 strength bilaterally in deltoids, biceps, triceps, hand , hip flexors, hip extensors, knee flexion, knee extension, plantarflexion, dorsiflexion.   Reflexes: Normo-reflexic and symmetric biceps, brachioradialis, triceps, patellae, and achilles. Negative Llanes, no clonus, toes down-going.  Sensory: Intact to light touch, pin, vibration, and proprioception in proximal and distal aspects of all 4 extremities   Coordination: FNF and HS without ataxia or dysmetria. Rapid alternating movements intact.   Gait: Deferred    Investigations   I have personally reviewed pertinent labs, tests, and radiological imaging. Discussion of notable findings is included under Impression.     Was patient transferred from outside hospital?   No    Impression  #Hx of seizures on Keppra  #Brain mets s/p radiation c/b radiation-induced brain necrosis s/p craniotomy with resection    44 year old male " with history of stage IV NSCLC with mets to brain s/p radiation c/b radiation-induced brain necrosis s/p craniotomy with resection, seizures on Keppra, provoked PE on AC, migraine headaches, HTN, presented to the ED with increasing focal seizures over 2 weeks.  Neurology is consulted for further evaluation and management.    Patient is here with focal-aware seizures involving right arm and face around 7-8 spells per day since two weeks in the setting of keppra non-compliance. He is taking 1500mg a day whereas he is prescribed 1500mg BID. His keppra levels <2 but he had not taken his dose this morning and with its half life of 6-8 hours, it would have been washed out since the last dose on 4/30 morning.  He also had glucose over 700 that would have been an inciting factory for the seizures. His neurological exam is unremarkable. He recovered 4.5g keppra and did not have the spell since he is here. We recommended starting extended release for better compliance if its covered by the insurance.   Patient chose 1500mg BID knowing about the price/month. Patient was counseled about the importance of BID dosing based on the half. Patient confirmed the understanding. No indication for EEG or Imaging at this point.     I reviewed with the patient in detail [place] regulations on seizures and driving. Patient appeared to clearly understand that they prohibited from operating a motor vehicle within 3 months following any seizure or other episode with sudden unconsciousness and that they are required to report any future such seizure to the DMV within 30 days after the event. I also recommended that patient and family review all other activities, and avoid any activities that might lead to self-injury or injury of others.  Such activities include but are not limited to holding babies or young children at heights from which they might be injured if dropped, bathing infants or young children in situations in which they might  drown without continuous interactive care by an adult who is fully capable at all times during the bath, operating power cutting or other tools, handling firearms, exposure to heights from which she might fall, exposure to vessels with hot cooking oil or water, and swimming alone.        Recommendations  -continue Keppra 1500mg BID  -Neurology referral out patient (ordered for you)    Neurology will sign off now. Please call for more questions.      Thank you for involving Neurology in the care of Connor Emerson.  Please do not hesitate to call with questions/concerns (consult pager [phone]).      Patient was seen and discussed with Dr. Sierra.      Lizbeth Kimball MD MS  Neurology PGY3

## 2025-05-02 NOTE — CONSULTS
Palliative Care Consultation Note  [facility]      Patient: Connor Emerson  Date of Admission:  5/1/2025    Requesting Clinician / Team:     Eliezer Garrison MD PhD     Reason for consult: Pain management    On methadone and oxycodone. Unsure of compliance. Please help with any titration if required and managment.      Recommendations & Counseling     GOALS OF CARE:   Not discussed at this visit    ADVANCE CARE PLANNING:  No health care directive on file. Per system policy, Surrogate Decision-makers for Patients With Diminished Decision-making Capacity offers guidance on possible decision-makers. Wife- Kylie has been identified as a surrogate decision maker.   There is no POLST form on file, recommend to complete prior to DC.  There is no POLST form on file,  recommend continued medical treatment and FULL CODE   Code status: Full Code    MEDICAL MANAGEMENT:   #Pain, pain is all over, feels like its musculoskeletal, documented previously related to cancer however seems more side effect from his treatment related pain, takes usually 6 tablets a day, two in the morning, two around lunch time, and two at night, reported at night not really sure what he does that but he does. Cancer treatment makes his pain worse, not really sure what else makes the pain worse. Pain is better with oxycodone, not managed as well as he would like it as he is having to push through the pain during his day. 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 He has not been taking methadone due to not being able to pay out of pocket for it and willing to pay for oxycodone out of pocket.   I stopped his methadone given he doesn't have insurance and can't pay for it right now  I changed his oxycodone to 10-20 mg q6h PRN which is how he takes it at home     #Anxiety, difficult to sleep at night, racing thoughts that are difficult to stop.  He thinks  he still has some duloxetine he may be taking at home, would encourage him to see if he still can take this ongoing, withdrawal can be unpleasant if doses are missed.        Palliative Care will sign off at this time. Thank you for the consult and allowing us to aid in the care of Connor Emerson.    These recommendations have been discussed with primary team.    JERRY Velasquez CNS  [facility], Palliative Care  Securely message with the [employer] Web Console (learn more here) or  Text page via [employer] Paging/Directory         Assessment      Connor Emerson is a 44 year old male with a past medical history of T2DM, HTN, metastatic non-small cell lung carcinoma with mets to brain s/p radiation complicated by radiation-induced brain necrosis s/p craniotomy who presented on 5/1 with breakthrough seizures, hyperglycemia concerning for HHS or DKA.  Palliative was consulted for assistance with symptom management.    Today, the patient was seen for:  Metastatic non-small cell lung carcinoma with mets to brain s/p radiation complicated by radiation-induced brain necrosis s/p craniotomy  Cancer related pain  Insomnia with racing thoughts at night    History of Present Illness   Met with patient.   Introduced the role of palliative care as an interdisciplinary team that cares for patients with serious illness to help support symptom management, communication, coping for patients and their families as well as support with medical decision making.    Prognosis, Goals, & Planning:   Functional Status just prior to this current hospitalization:  ECOGNot assessed          Coping, Meaning, & Spirituality:   Mood, coping, and/or meaning in the context of serious illness were addressed today: Yes a main stressor is around getting insurance for his medical care.     Social:   Important relationships/caregivers: wife Kylie    Medications:  Reviewed this patient's medication profile and medications from this hospitalization. Notable  medications:  Duloxetine 30 mg daily  Lorazepam PRN-x1  Methadone 5 mg BID    Oxycodone 10 mg tabs prescribed, prescribed able to take 8 tablets a day.    [place] Board of Pharmacy Data Base Reviewed: Yes:   reviewed - controlled substances prescribed by other outside provider(s)..    ROS:  Comprehensive ROS is reviewed and is negative except as here & per HPI:     Physical Exam   Vital Signs with Ranges  Temp:  [97.9  F (36.6  C)-98.3  F (36.8  C)] 98.3  F (36.8  C)  Pulse:  [68-97] 75  Resp:  [16-17] 16  BP: (109-133)/(75-98) 111/77  SpO2:  [93 %-97 %] 94 %  Wt Readings from Last 10 Encounters:   05/01/25 99.8 kg (220 lb)   12/30/24 98.4 kg (217 lb)   12/21/24 100.8 kg (222 lb 3.6 oz)   12/16/24 100 kg (220 lb 6.4 oz)   12/04/24 98.2 kg (216 lb 9.6 oz)   11/19/24 96.2 kg (212 lb)   11/15/24 99.8 kg (220 lb)   10/07/24 97.5 kg (215 lb)   08/30/24 98.4 kg (217 lb)   08/01/24 99.8 kg (220 lb)     220 lbs 0 oz    PHYSICAL EXAM:  Constitutional: Awake, alert, cooperative, no apparent distress  Lungs: No increased work of breathing, good air exchange  Neurologic: Awake, alert, oriented to name, place and time.  Neuropsychiatric: Normal affect, mood, orientation, memory and insight.  Skin: No rashes, erythema, pallor, petechia or purpura.      Data reviewed:  Results for orders placed or performed during the hospital encounter of 05/01/25 (from the past 24 hours)   Glucose by meter   Result Value Ref Range    GLUCOSE BY METER POCT >600 (HH) 70 - 99 mg/dL   Glucose by meter   Result Value Ref Range    GLUCOSE BY METER POCT 477 (H) 70 - 99 mg/dL   Troponin T, High Sensitivity   Result Value Ref Range    Troponin T, High Sensitivity 9 <=22 ng/L   Ketone Beta-Hydroxybutyrate Quantitative   Result Value Ref Range    Ketone (Beta-Hydroxybutyrate) Quantitative 0.28 <=0.30 mmol/L   Basic metabolic panel   Result Value Ref Range    Sodium 140 135 - 145 mmol/L    Potassium 3.7 3.4 - 5.3 mmol/L    Chloride 103 98 - 107 mmol/L     Carbon Dioxide (CO2) 25 22 - 29 mmol/L    Anion Gap 12 7 - 15 mmol/L    Urea Nitrogen 11.7 6.0 - 20.0 mg/dL    Creatinine 0.55 (L) 0.67 - 1.17 mg/dL    GFR Estimate >90 >60 mL/min/1.73m2    Calcium 9.2 8.8 - 10.4 mg/dL    Glucose 482 (H) 70 - 99 mg/dL   Phosphorus   Result Value Ref Range    Phosphorus 2.6 2.5 - 4.5 mg/dL   Magnesium   Result Value Ref Range    Magnesium 1.8 1.7 - 2.3 mg/dL   Hepatic panel   Result Value Ref Range    Protein Total 6.2 (L) 6.4 - 8.3 g/dL    Albumin 4.0 3.5 - 5.2 g/dL    Bilirubin Total 0.2 <=1.2 mg/dL    Alkaline Phosphatase 146 40 - 150 U/L    AST 16 0 - 45 U/L    ALT 21 0 - 70 U/L    Bilirubin Direct 0.09 0.00 - 0.30 mg/dL   Glucose by meter   Result Value Ref Range    GLUCOSE BY METER POCT 387 (H) 70 - 99 mg/dL   Glucose by meter   Result Value Ref Range    GLUCOSE BY METER POCT 411 (H) 70 - 99 mg/dL   Osmolality   Result Value Ref Range    Osmolality Blood 312 (H) 275 - 295 mmol/kg    Narrative    Greater than 385 mmol/kg relates to stupor in hyperglycemia   Greater than 400 mmol/kg can relate to seizures   Greater than 420 mmol/kg can be lethal    Serum Osmalar Gap:   Normal <10   Larger suggest unmeasured substances present in serum (ethanol, methanol, isopropanol, mannitol, ethylene glycol).   Blood gas venous   Result Value Ref Range    pH Venous 7.41 7.32 - 7.43    pCO2 Venous 48 40 - 50 mm Hg    pO2 Venous 44 25 - 47 mm Hg    Bicarbonate Venous 31 (H) 21 - 28 mmol/L    Base Excess/Deficit Venous 4.8 (H) -3.0 - 3.0 mmol/L    FIO2 21     Oxyhemoglobin Venous 77 (H) 70 - 75 %    O2 Sat, Venous 78.2 (H) 70.0 - 75.0 %    Narrative    In healthy individuals, oxyhemoglobin (O2Hb) and oxygen saturation (SO2) are approximately equal. In the presence of dyshemoglobins, oxyhemoglobin can be considerably lower than oxygen saturation.   EKG 12-lead, tracing only - DKA   Result Value Ref Range    Systolic Blood Pressure  mmHg    Diastolic Blood Pressure  mmHg    Ventricular Rate 93  BPM    Atrial Rate 93 BPM    IN Interval 146 ms    QRS Duration 82 ms     ms    QTc 450 ms    P Axis 33 degrees    R AXIS 24 degrees    T Axis 44 degrees    Interpretation ECG       Sinus rhythm  Normal ECG    Unconfirmed report - interpretation of this ECG is computer generated - see medical record for final interpretation  Confirmed by - EMERGENCY ROOM, PHYSICIAN ([id number]),  NICOLE HUFFMAN ([id number]) on 5/2/2025 7:02:23 AM     Glucose by meter   Result Value Ref Range    GLUCOSE BY METER POCT 318 (H) 70 - 99 mg/dL   Glucose by meter   Result Value Ref Range    GLUCOSE BY METER POCT 286 (H) 70 - 99 mg/dL   Basic metabolic panel   Result Value Ref Range    Sodium 142 135 - 145 mmol/L    Potassium 3.4 3.4 - 5.3 mmol/L    Chloride 105 98 - 107 mmol/L    Carbon Dioxide (CO2) 27 22 - 29 mmol/L    Anion Gap 10 7 - 15 mmol/L    Urea Nitrogen 10.8 6.0 - 20.0 mg/dL    Creatinine 0.53 (L) 0.67 - 1.17 mg/dL    GFR Estimate >90 >60 mL/min/1.73m2    Calcium 9.4 8.8 - 10.4 mg/dL    Glucose 267 (H) 70 - 99 mg/dL   CBC with platelets differential    Narrative    The following orders were created for panel order CBC with platelets differential.  Procedure                               Abnormality         Status                     ---------                               -----------         ------                     CBC with platelets and ...[[id number]]                      Final result                 Please view results for these tests on the individual orders.   Osmolality   Result Value Ref Range    Osmolality Blood 311 (H) 275 - 295 mmol/kg    Narrative    Greater than 385 mmol/kg relates to stupor in hyperglycemia   Greater than 400 mmol/kg can relate to seizures   Greater than 420 mmol/kg can be lethal    Serum Osmalar Gap:   Normal <10   Larger suggest unmeasured substances present in serum (ethanol, methanol, isopropanol, mannitol, ethylene glycol).   CBC with platelets and differential   Result Value Ref Range     WBC Count 9.6 4.0 - 11.0 10e3/uL    RBC Count 5.15 4.40 - 5.90 10e6/uL    Hemoglobin 15.7 13.3 - 17.7 g/dL    Hematocrit 45.3 40.0 - 53.0 %    MCV 88 78 - 100 fL    MCH 30.5 26.5 - 33.0 pg    MCHC 34.7 31.5 - 36.5 g/dL    RDW 12.0 10.0 - 15.0 %    Platelet Count 203 150 - 450 10e3/uL    % Neutrophils 64 %    % Lymphocytes 26 %    % Monocytes 6 %    % Eosinophils 3 %    % Basophils 1 %    % Immature Granulocytes 0 %    NRBCs per 100 WBC 0 <1 /100    Absolute Neutrophils 6.1 1.6 - 8.3 10e3/uL    Absolute Lymphocytes 2.5 0.8 - 5.3 10e3/uL    Absolute Monocytes 0.6 0.0 - 1.3 10e3/uL    Absolute Eosinophils 0.3 0.0 - 0.7 10e3/uL    Absolute Basophils 0.1 0.0 - 0.2 10e3/uL    Absolute Immature Granulocytes 0.0 <=0.4 10e3/uL    Absolute NRBCs 0.0 10e3/uL   Glucose by meter   Result Value Ref Range    GLUCOSE BY METER POCT 217 (H) 70 - 99 mg/dL   Glucose by meter   Result Value Ref Range    GLUCOSE BY METER POCT 172 (H) 70 - 99 mg/dL   Basic metabolic panel   Result Value Ref Range    Sodium 145 135 - 145 mmol/L    Potassium 3.4 3.4 - 5.3 mmol/L    Chloride 105 98 - 107 mmol/L    Carbon Dioxide (CO2) 27 22 - 29 mmol/L    Anion Gap 13 7 - 15 mmol/L    Urea Nitrogen 11.6 6.0 - 20.0 mg/dL    Creatinine 0.52 (L) 0.67 - 1.17 mg/dL    GFR Estimate >90 >60 mL/min/1.73m2    Calcium 9.4 8.8 - 10.4 mg/dL    Glucose 146 (H) 70 - 99 mg/dL   Ketone Beta-Hydroxybutyrate Quantitative   Result Value Ref Range    Ketone (Beta-Hydroxybutyrate) Quantitative <0.18 <=0.30 mmol/L   Phosphorus   Result Value Ref Range    Phosphorus 2.8 2.5 - 4.5 mg/dL   Extra Tube    Narrative    The following orders were created for panel order Extra Tube.  Procedure                               Abnormality         Status                     ---------                               -----------         ------                     Extra Purple Top Tube[[id number]]                           In process                   Please view results for these tests on the  individual orders.   Glucose by meter   Result Value Ref Range    GLUCOSE BY METER POCT 296 (H) 70 - 99 mg/dL   Glucose by meter   Result Value Ref Range    GLUCOSE BY METER POCT 284 (H) 70 - 99 mg/dL   Glucose by meter   Result Value Ref Range    GLUCOSE BY METER POCT 262 (H) 70 - 99 mg/dL   Basic metabolic panel   Result Value Ref Range    Sodium 141 135 - 145 mmol/L    Potassium 3.4 3.4 - 5.3 mmol/L    Chloride 103 98 - 107 mmol/L    Carbon Dioxide (CO2) 27 22 - 29 mmol/L    Anion Gap 11 7 - 15 mmol/L    Urea Nitrogen 14.0 6.0 - 20.0 mg/dL    Creatinine 0.73 0.67 - 1.17 mg/dL    GFR Estimate >90 >60 mL/min/1.73m2    Calcium 9.2 8.8 - 10.4 mg/dL    Glucose 239 (H) 70 - 99 mg/dL   Ketone Beta-Hydroxybutyrate Quantitative   Result Value Ref Range    Ketone (Beta-Hydroxybutyrate) Quantitative <0.18 <=0.30 mmol/L   Phosphorus   Result Value Ref Range    Phosphorus 3.0 2.5 - 4.5 mg/dL   Glucose by meter   Result Value Ref Range    GLUCOSE BY METER POCT 236 (H) 70 - 99 mg/dL   Glucose by meter   Result Value Ref Range    GLUCOSE BY METER POCT 219 (H) 70 - 99 mg/dL   Glucose by meter   Result Value Ref Range    GLUCOSE BY METER POCT 152 (H) 70 - 99 mg/dL   Basic metabolic panel   Result Value Ref Range    Sodium 140 135 - 145 mmol/L    Potassium 3.8 3.4 - 5.3 mmol/L    Chloride 104 98 - 107 mmol/L    Carbon Dioxide (CO2) 24 22 - 29 mmol/L    Anion Gap 12 7 - 15 mmol/L    Urea Nitrogen 17.7 6.0 - 20.0 mg/dL    Creatinine 0.55 (L) 0.67 - 1.17 mg/dL    GFR Estimate >90 >60 mL/min/1.73m2    Calcium 8.3 (L) 8.8 - 10.4 mg/dL    Glucose 177 (H) 70 - 99 mg/dL   Ketone Beta-Hydroxybutyrate Quantitative   Result Value Ref Range    Ketone (Beta-Hydroxybutyrate) Quantitative <0.18 <=0.30 mmol/L   Phosphorus   Result Value Ref Range    Phosphorus 3.5 2.5 - 4.5 mg/dL   Glucose by meter   Result Value Ref Range    GLUCOSE BY METER POCT 205 (H) 70 - 99 mg/dL   Glucose by meter   Result Value Ref Range    GLUCOSE BY METER POCT 266 (H) 70  - 99 mg/dL   Glucose by meter   Result Value Ref Range    GLUCOSE BY METER POCT 243 (H) 70 - 99 mg/dL   Glucose by meter   Result Value Ref Range    GLUCOSE BY METER POCT 245 (H) 70 - 99 mg/dL   Glucose by meter   Result Value Ref Range    GLUCOSE BY METER POCT 206 (H) 70 - 99 mg/dL   Basic metabolic panel   Result Value Ref Range    Sodium 141 135 - 145 mmol/L    Potassium 3.2 (L) 3.4 - 5.3 mmol/L    Chloride 106 98 - 107 mmol/L    Carbon Dioxide (CO2) 25 22 - 29 mmol/L    Anion Gap 10 7 - 15 mmol/L    Urea Nitrogen 18.6 6.0 - 20.0 mg/dL    Creatinine 0.51 (L) 0.67 - 1.17 mg/dL    GFR Estimate >90 >60 mL/min/1.73m2    Calcium 9.2 8.8 - 10.4 mg/dL    Glucose 172 (H) 70 - 99 mg/dL   Ketone Beta-Hydroxybutyrate Quantitative   Result Value Ref Range    Ketone (Beta-Hydroxybutyrate) Quantitative <0.18 <=0.30 mmol/L   Phosphorus   Result Value Ref Range    Phosphorus 2.4 (L) 2.5 - 4.5 mg/dL   Glucose by meter   Result Value Ref Range    GLUCOSE BY METER POCT 150 (H) 70 - 99 mg/dL   Glucose by meter   Result Value Ref Range    GLUCOSE BY METER POCT 310 (H) 70 - 99 mg/dL     *Note: Due to a large number of results and/or encounters for the requested time period, some results have not been displayed. A complete set of results can be found in Results Review.     Recent Labs   Lab 05/02/25  1031 05/02/25  0743 05/02/25  0714 05/02/25  0201 05/02/25  0200 05/01/25  2300 05/01/25  2212 05/01/25  1704 05/01/25  1550 05/01/25  1353 05/01/25  1329 05/01/25  1029 05/01/25  1018   WBC  --   --   --   --   --   --   --   --  9.6  --   --   --  8.4   HGB  --   --   --   --   --   --   --   --  15.7  --   --   --  16.4   MCV  --   --   --   --   --   --   --   --  88  --   --   --  88   PLT  --   --   --   --   --   --   --   --  203  --   --   --  203   NA  --   --  141  --  140  --  141   < > 142  --  140  --  131*   POTASSIUM  --   --  3.2*  --  3.8  --  3.4   < > 3.4  --  3.7  --  3.9   CHLORIDE  --   --  106  --  104  --  103   <  > 105  --  103  --  94*   CO2  --   --  25  --  24  --  27   < > 27  --  25  --  23   BUN  --   --  18.6  --  17.7  --  14.0   < > 10.8  --  11.7  --  14.3   CR  --   --  0.51*  --  0.55*  --  0.73   < > 0.53*  --  0.55*  --  0.58*   ANIONGAP  --   --  10  --  12  --  11   < > 10  --  12  --  14   TORY  --   --  9.2  --  8.3*  --  9.2   < > 9.4  --  9.2  --  9.5   * 150* 172*   < > 177*   < > 239*   < > 267*   < > 482*   < > 758*   ALBUMIN  --   --   --   --   --   --   --   --   --   --  4.0  --  4.2   PROTTOTAL  --   --   --   --   --   --   --   --   --   --  6.2*  --  6.7   BILITOTAL  --   --   --   --   --   --   --   --   --   --  0.2  --  0.4   ALKPHOS  --   --   --   --   --   --   --   --   --   --  146  --  173*   ALT  --   --   --   --   --   --   --   --   --   --  21  --  23   AST  --   --   --   --   --   --   --   --   --   --  16  --  20    < > = values in this interval not displayed.       No results found for this or any previous visit (from the past 24 hours).    Medical Decision Making       60 MINUTES SPENT BY ME on the date of service doing chart review, history, exam, documentation & further activities per the note.

## 2025-05-03 VITALS
BODY MASS INDEX: 32.49 KG/M2 | DIASTOLIC BLOOD PRESSURE: 77 MMHG | SYSTOLIC BLOOD PRESSURE: 136 MMHG | RESPIRATION RATE: 17 BRPM | TEMPERATURE: 97.9 F | OXYGEN SATURATION: 98 % | HEART RATE: 65 BPM | WEIGHT: 220 LBS

## 2025-05-03 LAB
BACTERIA UR CULT: NORMAL
GLUCOSE BLDC GLUCOMTR-MCNC: 184 MG/DL (ref 70–99)
GLUCOSE BLDC GLUCOMTR-MCNC: 267 MG/DL (ref 70–99)
GLUCOSE BLDC GLUCOMTR-MCNC: 272 MG/DL (ref 70–99)
MAGNESIUM SERPL-MCNC: 1.8 MG/DL (ref 1.7–2.3)
PHOSPHATE SERPL-MCNC: 3.7 MG/DL (ref 2.5–4.5)
POTASSIUM SERPL-SCNC: 3.6 MMOL/L (ref 3.4–5.3)

## 2025-05-03 PROCEDURE — 250N000013 HC RX MED GY IP 250 OP 250 PS 637: Performed by: STUDENT IN AN ORGANIZED HEALTH CARE EDUCATION/TRAINING PROGRAM

## 2025-05-03 PROCEDURE — 82962 GLUCOSE BLOOD TEST: CPT

## 2025-05-03 PROCEDURE — 83735 ASSAY OF MAGNESIUM: CPT

## 2025-05-03 PROCEDURE — 36415 COLL VENOUS BLD VENIPUNCTURE: CPT | Performed by: PEDIATRICS

## 2025-05-03 PROCEDURE — 99239 HOSP IP/OBS DSCHRG MGMT >30: CPT | Mod: GC | Performed by: PEDIATRICS

## 2025-05-03 PROCEDURE — 99233 SBSQ HOSP IP/OBS HIGH 50: CPT | Performed by: NURSE PRACTITIONER

## 2025-05-03 PROCEDURE — 250N000013 HC RX MED GY IP 250 OP 250 PS 637

## 2025-05-03 PROCEDURE — 84100 ASSAY OF PHOSPHORUS: CPT | Performed by: PEDIATRICS

## 2025-05-03 PROCEDURE — 84132 ASSAY OF SERUM POTASSIUM: CPT

## 2025-05-03 RX ORDER — INSULIN ASPART 100 [IU]/ML
INJECTION, SOLUTION INTRAVENOUS; SUBCUTANEOUS
Qty: 45 ML | Refills: 11 | Status: SHIPPED | OUTPATIENT
Start: 2025-05-03

## 2025-05-03 RX ADMIN — INSULIN ASPART 10 UNITS: 100 INJECTION, SOLUTION INTRAVENOUS; SUBCUTANEOUS at 07:56

## 2025-05-03 RX ADMIN — ROSUVASTATIN CALCIUM 10 MG: 10 TABLET, FILM COATED ORAL at 07:51

## 2025-05-03 RX ADMIN — PROPRANOLOL HYDROCHLORIDE 20 MG: 20 TABLET ORAL at 07:51

## 2025-05-03 RX ADMIN — LEVETIRACETAM 1500 MG: 500 TABLET, FILM COATED ORAL at 07:51

## 2025-05-03 RX ADMIN — DULOXETINE HYDROCHLORIDE 30 MG: 30 CAPSULE, DELAYED RELEASE ORAL at 07:51

## 2025-05-03 RX ADMIN — AMLODIPINE BESYLATE 10 MG: 5 TABLET ORAL at 07:51

## 2025-05-03 RX ADMIN — INSULIN ASPART 10 UNITS: 100 INJECTION, SOLUTION INTRAVENOUS; SUBCUTANEOUS at 11:35

## 2025-05-03 RX ADMIN — LISINOPRIL 40 MG: 40 TABLET ORAL at 07:51

## 2025-05-03 ASSESSMENT — ACTIVITIES OF DAILY LIVING (ADL)
ADLS_ACUITY_SCORE: 56

## 2025-05-03 NOTE — DISCHARGE INSTRUCTIONS
IP Diabetes Management Team Discharge Instructions    Glucose Control Regimen:     Blood glucose monitoring:     Three times daily before meals, bedtime and as needed.  Blood glucose (BG) goal:  mg/dL       Resume Metformin 500 mg BID and Jardiance 25 mg once a home      Glucose Control Regimen:  Long-acting insulin: basal dose - take 96 units once daily at 0800. Take at same time each day. You may move the time of this dose a maximum of 2 hours each day until you reach your desired time.                                                        Rapid-acting insulin:  (Novolog) correction dose - see chart below. Take for high blood glucoses three times daily before meals and at bedtime.    You may take the correction dose even if you skip a meal.   Do not take this correction dose more frequently than every 4 hours.      Give correction for  and over during the day 0800 / 1200 and 1700   ISF 15   2 per 30 >/= 140  -169 give 2 units.  -199 give 4 units.  -229 give 6 units.  -259 give 8 units.  -289 give 10 units.  -319 give 12 units.  -349 give 14 units.  BG >/= 350 give 16 units.    Bedtime correction if BG is 200 or over    -229 give 2 units.  -259 give 4 units.  -289 give 6 units.  -319 give 8 units.  -349 give 10 units.  BG >/= 350 give 12 units.       Blood Glucose Checks: Three times daily before meals, and at bedtime.    Endocrinology Outpatient follow up: follow up with established :  PCP Nelly    If you have urgent questions or concerns regarding your blood sugars or insulin, you may contact [phone] (the main hospital ). Ask to me or speak with the endocrinologist on call.    How to treat a low blood sugar:  You may be experiencing hypoglycemia (low blood sugar) if:  BG less than 65 mg/dL or when feeling symptoms of hypoglycemia such as  Sweating  Shakiness/Tremors  Increased appetite  Nausea  Dizziness or  light-headedness  Sleepiness  Weakness  Rapid heart rate  Headache  Tingling around mouth and tongue    If you are having a low blood sugar, do the following steps:  -Eat 15 grams simple Carbs (1/2 cup orange juice, 4 glucose tablets, 1/2 cup regular soda)  -Wait 15 minutes  -Test with your blood glucose meter again  -If your blood sugar is above 70, then continue normally  -If still less than 70, repeat above process until over 70  -Call clinic for recurrent low blood glucose (more than 2 a week or 2 a day)      Your target A1c value is less than 7%.     Thank you for letting the Diabetes Management Team be involved in your care!  Shannon Eldridge, JERRY CNP, BC-ADM   Inpatient Diabetes Management Service      Patient does not have active pharmacy benefits. Expected cost of insulin listed below.     Test Claim Price Note   lantus solostar pens 35.00 urgent need voucher available,  $35 insulin card available   tresiba flextouch pens 652.45 urgent need voucher available,  $35 insulin card available      Resources  Contact pharmacy liaison to apply urgent emergency insulin vouchers on discharge.      Emergency Voucher links:  Lantus   [email or web address]       Tresiba   [email or web address]://[email or web address].com/diabetes/help-with-costs/help-with-insulin-costs/immediate-supply.html      $35 per month insulin card links:  $35 vouchers for novolog, tresiba, or levemir:   [email or web address]://www.[email or web address].com/diabetes/help-with-costs/help-with-insulin-costs/myinsulinrx.html       $35 vouchers for lantus or toujeo.   [email or web address]://www.teamingupfordiabetes.com/sanofidiabetes-savings-program        [place] Insulin Safety Net Program   https://[place]boards[place]assets/ISNP-Information-Sheets-for-Patients%2010.21.2021_tcm21-656048.pdf

## 2025-05-03 NOTE — DISCHARGE SUMMARY
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  # Metastatic non-small cell lung carcinoma  # T2DM on insulin  # HHS, resolved  # Hypertension  #HLD  # Hx of malignancy provoked PE  # Cancer related pain  # Mood    Clinically Significant Risk Factors     # DMII: A1C = 15.3 % (Ref range: <5.7 %) within past 6 months       Follow-ups Needed After Discharge   Follow-up Appointments       Hospital Follow-up with Existing Primary Care Provider (PCP)          Schedule Primary Care visit within: 7 Days   Recommended labs and Imaging (to be ordered by Primary Care Provider): Blood sugar monitoring and medication compliance             Unresulted Labs Ordered in the Past 30 Days of this Admission       No orders found from 4/1/2025 to 5/2/2025.            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 On admission day he had 3 seizures.  States he has been taking 1000 mg of Keppra per day rather than twice per day which he has been doing for about a month previously was taking 500 mg once per day.  Patient states that his seizures primarily started in his right hand which causes him to lock up.  What prompted him to seek medical attention this time around was uncontrollable eye twitching which is different from his usual right hand seizures. He is afebrile with systolics to 180s  tachycardic to 100 breathing well on room air.  Physical exam is without any neurological focal deficits.  Notable labs are as follows sodium 131, VBG, white count within normal limits, EKG without abnormalities, Keppra level is undetectable. Patient was evaluated by neurology and loaded with Keppra and started on 1500 mg Keppra BID.  - s/p keppra load with 4500 mg  - Keppra 1500 BID  - Neurology follow up to be arranged by          # T2DM on insulin  # HHS, resolved  In addition to seizures he stated he ran out of insulin about a week ago and he has been urinating frequently has not been able to keep up with his hydration. Glucose initially 758 on admission. Patient states he has insurance issues with poor access to medications. VBG normal, not gap acidosis. Started on insulin drip   - Endocrinology follow up to be arranged by   - Increased PTA glargine 80 units to 96 units to be split to 48 units BID  - Carb Coverage 1:5  - Continue PTA Empaglaflozin  - Continue PTA Metformin        # Hypertension  - Continue PTA Amlodipine 10 mg  - Continue PTA lisinopril 40 mg  - Continue PTA propranolol 20 mg       #HLD  - Continue PTA Rosuvastatin 10 mg       # Hx of malignancy provoked PE  - Continue PTA rivaroxiabn 20 mg daily        # Cancer related pain  # Mood  Palliative consult for management of chronic pain. Palliative stopped methadone given that he was not taking as he has no insurance and has to pay out of pocket.   - Stopped PTA Methadone 5 mg BID  - Continue PTA Oxycodone 10 - 20 mg Q6H PRN  - Continue PTA Duloxetine 30 mg   - Stop PTA fluoxetine (no benefit in addition to duloxetine)      Consultations This Hospital Stay   NEUROLOGY GENERAL ADULT IP CONSULT  PHARMACY IP CONSULT  PHARMACY LIAISON FOR MEDICATION COVERAGE CONSULT  ENDOCRINE DIABETES ADULT IP CONSULT  SOCIAL WORK IP CONSULT  PHARMACY IP CONSULT  PALLIATIVE CARE ADULT IP CONSULT  CARE MANAGEMENT / SOCIAL WORK IP CONSULT  SOCIAL WORK  IP CONSULT  PHARMACY LIAISON FOR MEDICATION COVERAGE CONSULT    Code Status   Full Code       The patient was discussed with supervising physician    Eliezer Garrison MD PhD  [facility] Team Service  [facility] EMERGENCY DEPARTMENT  [address]  [place] [address]  Phone: [phone]  ______________________________________________________________________    Physical Exam   Vital Signs: Temp: 97.9  F (36.6  C) Temp src: Oral BP: 136/77 Pulse: 65   Resp: 17 SpO2: 98 % O2 Device: None (Room air)    Weight: 220 lbs 0 oz    N: Alert and oriented x3. Grossly normal neurologic functioning  P: Clear bilaterally, breathing well without retractions  C: RRR, no murmurs appreciated, adiaphoretic  GI: Soft, non-tender, non-distended  M: No lower extremity edema appreciated. No noted skin rashes. No noted lesions         Primary Care Physician   Jessica Mohr    Discharge Orders      Adult Neurology  Referral      Reason for your hospital stay    You had seizures and received Keppra. Your blood sugar was also high and we increased your home insulin.     Activity    Your activity upon discharge: activity as tolerated     Diet    Follow this diet upon discharge: Current Diet:Orders Placed This Encounter      Regular Diet Adult     Hospital Follow-up with Existing Primary Care Provider (PCP)            Significant Results and Procedures   Results for orders placed or performed during the hospital encounter of 12/21/24   MR Brain w/o & w Contrast    Narrative    EXAM: MR BRAIN W/O and W CONTRAST  LOCATION: [facility]  DATE: 12/21/2024    INDICATION: Right arm contractions  COMPARISON: Brain MRI 12/11/2024, 08/28/2024, 06/16/2023  CONTRAST: 10mL Gadavist  TECHNIQUE: Routine multiplanar multisequence head MRI without and with intravenous contrast.    FINDINGS:  INTRACRANIAL CONTENTS: Diffusion-weighted images demonstrate no areas of restricted diffusion. Redemonstration of right frontal  craniotomy and resection cavity in the right frontal lobe. The T2 prolongation marginating the resection cavity in the orbital   right frontal lobe is not significantly changed compared to the prior brain MRIs. There is some associated chronic hemosiderin staining and T1 shortening within the resection cavity in the orbital left frontal lobe. The dural thickening and enhancement   deep to the craniotomy in the deeper patchy enhancement associated with the resection cavity in the orbital right frontal lobe is able to slightly decreased compared to 12/11/2024 but mild to moderately decreased compared to 08/28/2024.    Redemonstration of left frontoparietal craniotomy and resection cavity in the posterior left frontal lobe. The T2/FLAIR hyperintensity marginating the resection cavity posterior left frontal lobe is not significantly changed. The associated dural   thickening and enhancement as well as the deeper patchy enhancement surrounding the resection cavity in the posterior left frontal lobe measuring approximately 1.1 x 1.7 cm is not significantly changed.    No significant change in the approximately 1.1 x 1.4 cm enhancement in the inferior right temporal lobe. The surrounding FLAIR hyperintensity is also not significantly changed.    The bilobed peripheral enhancement involving the left cerebellar hemisphere appear less cystic and slightly more nodular and thicker compared to 08/28/2024. The surrounding FLAIR hyperintensity is slightly increased compared to 12/11/2024 but moderately   increased compared to 08/28/2024.    Stable 0.8 cm cortical and subcortical enhancing focus in the right parietal lobe. The punctate enhancement involving the orbital left frontal lobe appears less conspicuous compared to the prior exams. Mild diffuse cerebral parenchymal volume loss. No   midline shift. The basilar cisterns are patent. No cerebellar tonsillar ectopia. Mild periventricular and scattered foci of deep white matter  T2 prolongation involving both cerebral hemispheres. These are likely due to treatment related changes and/or   chronic hypertensive/microvascular ischemic white matter changes.    SELLA: No abnormality accounting for technique.    OSSEOUS STRUCTURES/SOFT TISSUES: Normal marrow signal. The major intracranial vascular flow voids are maintained.     ORBITS: No abnormality accounting for technique.     SINUSES/MASTOIDS: Mild mucosal thickening of the ethmoid air cells. No middle ear or mastoid effusion.       Impression    IMPRESSION:  1.  The dural thickening and enhancement deep to the craniotomy in the deeper patchy enhancement associated with the resection cavity in the orbital right frontal lobe is able to slightly decreased compared to 12/11/2024 but mild to moderately decreased   compared to 08/28/2024.  2.  The bilobed peripheral enhancement involving the left cerebellar hemisphere appear less cystic and slightly more nodular and thicker compared to 08/28/2024. The surrounding FLAIR hyperintensity is slightly increased compared to 12/11/2024 but   moderately increased compared to 08/28/2024.  3.  The dural thickening and enhancement as well as the deeper patchy enhancement surrounding the resection cavity in the posterior left frontal lobe measuring approximately 1.1 x 1.7 cm is not significantly changed.  4.  No significant change in the approximately 1.1 x 1.4 cm enhancement in the inferior right temporal lobe. The surrounding FLAIR hyperintensity is also not significantly changed.  5.  The other known enhancing intracranial lesions are stable.  6.  No new enhancing intracranial lesions. No superimposed acute intracranial pathology.     *Note: Due to a large number of results and/or encounters for the requested time period, some results have not been displayed. A complete set of results can be found in Results Review.       Discharge Medications   Discharge Medication List as of 5/3/2025 12:56 PM        START  taking these medications    Details   insulin aspart (NOVOLOG FLEXPEN) 100 UNIT/ML pen Carb Coverage 1:5 g cho, Disp-45 mL, R-11, E-Prescribe           CONTINUE these medications which have CHANGED    Details   insulin glargine (LANTUS PEN) 100 UNIT/ML pen Inject 48 Units subcutaneously 2 times daily., Disp-30 mL, R-1, E-PrescribeIf Lantus is not covered by insurance, may substitute Basaglar or Semglee or other insulin glargine product per insurance preference at same dose and frequency.             CONTINUE these medications which have NOT CHANGED    Details   acetaminophen (TYLENOL) 325 MG tablet Take 325-650 mg by mouth every 6 hours as needed for mild pain, Historical      albuterol (PROAIR HFA/PROVENTIL HFA/VENTOLIN HFA) 108 (90 Base) MCG/ACT inhaler Inhale 2 puffs into the lungs every 6 hours as needed for shortness of breath, wheezing or cough, Disp-18 g, R-0, E-PrescribePharmacy may dispense brand covered by insurance (Proair, or proventil or ventolin or generic albuterol inhaler)      Alcohol Swabs PADS Use to swab the area of the injection or jabier as directed. Per insurance coverage, Disp-100 each, R-0, E-Prescribe      amLODIPine (NORVASC) 10 MG tablet Take 1 tablet (10 mg) by mouth daily., Disp-90 tablet, R-3, E-Prescribe      blood glucose (NO BRAND SPECIFIED) lancets standard To use to test glucose level in the blood Use to test blood sugar  4  times daily as directed. To accompany glucose monitor brands per insurance coverage.Disp-100 each, [id number]      blood glucose (NO BRAND SPECIFIED) test strip To use to test glucose level in the blood Use to test blood sugar  4 times daily as directed. To accompany glucose monitor brands per insurance coverage., Disp-100 strip, R-3, E-Prescribe      Blood Glucose Monitoring Suppl (ACCU-CHEK GUIDE) w/Device KIT Historical      Continuous Glucose  (FREESTYLE IRENE 3 READER) YVES Historical      Continuous Glucose Sensor (FREESTYLE IRENE 3 SENSOR)  MISC 1 each every 14 days. Use 1 sensor every 14 days. Use to read blood sugars per 's instructions., Disp-6 each, R-3, E-Prescribe      cyclobenzaprine (FLEXERIL) 5 MG tablet Take 2 tablets (10 mg) by mouth nightly as needed for muscle spasms., Disp-30 tablet, R-5, E-Prescribe      DULoxetine (CYMBALTA) 30 MG capsule Take 1 capsule (30 mg) by mouth 2 times daily., Disp-60 capsule, R-2, E-Prescribe      empagliflozin (JARDIANCE) 25 MG TABS tablet Take 1 tablet (25 mg) by mouth daily., Disp-90 tablet, R-1, E-Prescribe      insulin pen needle (32G X 4 MM) 32G X 4 MM miscellaneous Use as directed by provider. Per insurance coverageDisp-100 each, [id number]      !! levETIRAcetam (KEPPRA) 1000 MG tablet Take 1 tablet (1,000 mg) by mouth 2 times daily, Disp-60 tablet, R-11, E-Prescribe      !! levETIRAcetam (KEPPRA) 500 MG tablet Take 1 tablet (500 mg) by mouth 2 times daily. Add this to your prescribed 1000 mg dose twice daily to equate to 1500 mg twice daily., Disp-90 tablet, R-0, E-Prescribe      lisinopril (ZESTRIL) 40 MG tablet Take 1 tablet (40 mg) by mouth daily., Disp-90 tablet, R-0, E-Prescribe      metFORMIN (GLUCOPHAGE) 500 MG tablet Take 1 tablet (500 mg) by mouth 2 times daily (with meals), Disp-60 tablet, R-3, E-Prescribe      methylphenidate (RITALIN) 5 MG tablet Take 1-2 tablets (5-10 mg) by mouth 2 times daily as needed (fatigue). Take second dose by 12 noon, if it is needed, Disp-90 tablet, R-0, E-Prescribe      naloxone (NARCAN) 4 MG/0.1ML nasal spray Spray 1 spray (4 mg) into one nostril alternating nostrils as needed for opioid reversal every 2-3 minutes until assistance arrives, Disp-0.2 mL, R-3, E-Prescribe      oxyCODONE IR (ROXICODONE) 10 MG tablet Take 1 tablet (10 mg) by mouth every 3 hours as needed for moderate pain., Disp-120 tablet, R-0, E-Prescribe      prochlorperazine (COMPAZINE) 10 MG tablet Take 1 tablet (10 mg) by mouth every 6 hours as needed for nausea or vomiting,  Disp-30 tablet, R-2, E-Prescribe      propranolol (INDERAL) 20 MG tablet Take 1 tablet (20 mg) by mouth 2 times daily, Disp-60 tablet, R-1, E-Prescribe      rivaroxaban ANTICOAGULANT (XARELTO) 20 MG TABS tablet Take 1 tablet (20 mg) by mouth daily (with dinner), Disp-90 tablet, R-3, E-Prescribe      rosuvastatin (CRESTOR) 10 MG tablet Take 1 tablet (10 mg) by mouth daily., Disp-90 tablet, R-2, E-Prescribe       !! - Potential duplicate medications found. Please discuss with provider.        STOP taking these medications       FLUoxetine (PROZAC) 20 MG capsule Comments:   Reason for Stopping:         methadone (DOLOPHINE) 5 MG tablet Comments:   Reason for Stopping:             Allergies   Allergies   Allergen Reactions    Vicodin [Hydrocodone-Acetaminophen] Nausea and Vomiting and GI Disturbance

## 2025-05-05 ENCOUNTER — PATIENT OUTREACH (OUTPATIENT)
Dept: CARE COORDINATION | Facility: CLINIC | Age: 45
End: 2025-05-05

## 2025-05-05 ENCOUNTER — PATIENT OUTREACH (OUTPATIENT)
Dept: FAMILY MEDICINE | Facility: CLINIC | Age: 45
End: 2025-05-05

## 2025-05-05 NOTE — TELEPHONE ENCOUNTER
Please complete TCM outreach.    Diagnosis: Seizures, Hyperglycemia    Medications:  START taking:  NovoLOG FLEXPEN (insulin aspart)  CHANGE how you take:  insulin glargine (LANTUS PEN)  STOP taking:  FLUoxetine 20 MG capsule (PROzac)  methadone 5 MG tablet (DOLOPHINE)    Follow Up instructions:  Hospital Follow-up with Existing Primary Care Provider (PCP)  Schedule Primary Care visit within: 7 Days    Bhavana Pantoja RN 5/5/2025 8:50 AM  [facility]

## 2025-05-05 NOTE — TELEPHONE ENCOUNTER
ED / Discharge Outreach Protocol    Patient Contact    Attempt # 1    Was call answered?  No.  Unable to leave message.  Mailbox was full.  Will send a mychart.

## 2025-05-06 NOTE — TELEPHONE ENCOUNTER
Transitions of Care Outreach  Chief Complaint   Patient presents with    Hospital F/U       Most Recent Admission Date: 5/1/2025   Most Recent Admission Diagnosis: Seizure (H) - R56.9  Hyperglycemia - R73.9     Most Recent Discharge Date: 5/3/2025   Most Recent Discharge Diagnosis: Seizure (H) - R56.9  Hyperglycemia - R73.9  Type 2 diabetes mellitus without complication, with long-term current use of insulin (H) - E11.9, Z79.4     Transitions of Care Assessment    Discharge Assessment  How are you doing now that you are home?: doing well  How are your symptoms? (Red Flag symptoms escalate to triage hotline per guidelines): Improved  Do you know how to contact your clinic care team if you have future questions or changes to your health status? : No  Does the patient have their discharge instructions? : Yes  Does the patient have questions regarding their discharge instructions? : No  Were you started on any new medications or were there changes to any of your previous medications? : No  Does the patient have all of their medications?: Yes  Do you have questions regarding any of your medications? : No  Do you have all of your needed medical supplies or equipment (DME)?  (i.e. oxygen tank, CPAP, cane, etc.): Yes    Follow up Plan     Discharge Follow-Up  Discharge follow up appointment scheduled in alignment with recommended follow up timeframe or Transitions of Risk Category? (Low = within 30 days; Moderate= within 14 days; High= within 7 days): No  Patient's follow up appointment not scheduled: Patient declined scheduling support. Education on the importance of transitions of care follow up. Provided scheduling phone number.    No future appointments. Declined scheduling at this time as insurance is not active but it will be active soon per patient.    Outpatient Plan as outlined on AVS reviewed with patient.    For any urgent concerns, please contact our 24 hour nurse triage line: [phone] ([phone])        Bette Polk, RN

## 2025-05-07 ENCOUNTER — PATIENT OUTREACH (OUTPATIENT)
Dept: CARE COORDINATION | Facility: CLINIC | Age: 45
End: 2025-05-07

## 2025-05-11 ENCOUNTER — MYC REFILL (OUTPATIENT)
Dept: PALLIATIVE CARE | Facility: CLINIC | Age: 45
End: 2025-05-11

## 2025-05-11 DIAGNOSIS — Z98.890 S/P CRANIOTOMY: ICD-10-CM

## 2025-05-11 DIAGNOSIS — D49.6 BRAIN TUMOR (H): ICD-10-CM

## 2025-05-12 RX ORDER — OXYCODONE HYDROCHLORIDE 10 MG/1
10 TABLET ORAL
Qty: 120 TABLET | Refills: 0 | Status: SHIPPED | OUTPATIENT
Start: 2025-05-15

## 2025-05-12 NOTE — TELEPHONE ENCOUNTER
Received [employer] message from patient requesting refill of oxycodone.     Last refill: 5/1/25  Last office visit: 5/2/25 (seen while inpatient)  Scheduled for follow up not scheduled due to lack of insurance.     Will route request to MD/DO for review.     Reviewed [place].

## 2025-05-27 ENCOUNTER — MYC REFILL (OUTPATIENT)
Dept: PALLIATIVE CARE | Facility: CLINIC | Age: 45
End: 2025-05-27

## 2025-05-27 DIAGNOSIS — D49.6 BRAIN TUMOR (H): ICD-10-CM

## 2025-05-27 DIAGNOSIS — Z98.890 S/P CRANIOTOMY: ICD-10-CM

## 2025-05-27 RX ORDER — OXYCODONE HYDROCHLORIDE 10 MG/1
10 TABLET ORAL
Qty: 120 TABLET | Refills: 0 | Status: SHIPPED | OUTPATIENT
Start: 2025-05-29

## 2025-05-27 NOTE — TELEPHONE ENCOUNTER
Received [employer] message from patient requesting refill of oxycodone.      Last refill per chart review: 5/15, last refill per  review 5/1  Last office visit: 5/2/25 (seen while inpatient)  Scheduled for follow up not scheduled due to lack of insurance.      Will route request to MD/DO for review.      Reviewed [place]  Report.

## 2025-06-02 LAB
ATRIAL RATE - MUSE: 99 BPM
DIASTOLIC BLOOD PRESSURE - MUSE: NORMAL MMHG
INTERPRETATION ECG - MUSE: NORMAL
P AXIS - MUSE: 40 DEGREES
PR INTERVAL - MUSE: 144 MS
QRS DURATION - MUSE: 80 MS
QT - MUSE: 372 MS
QTC - MUSE: 477 MS
R AXIS - MUSE: 12 DEGREES
SYSTOLIC BLOOD PRESSURE - MUSE: NORMAL MMHG
T AXIS - MUSE: 49 DEGREES
VENTRICULAR RATE- MUSE: 99 BPM

## 2025-06-07 ENCOUNTER — MYC REFILL (OUTPATIENT)
Dept: PALLIATIVE CARE | Facility: CLINIC | Age: 45
End: 2025-06-07

## 2025-06-07 DIAGNOSIS — Z98.890 S/P CRANIOTOMY: ICD-10-CM

## 2025-06-07 DIAGNOSIS — D49.6 BRAIN TUMOR (H): ICD-10-CM

## 2025-06-09 RX ORDER — OXYCODONE HYDROCHLORIDE 10 MG/1
10 TABLET ORAL
Qty: 120 TABLET | Refills: 0 | Status: SHIPPED | OUTPATIENT
Start: 2025-06-12

## 2025-06-09 NOTE — TELEPHONE ENCOUNTER
Received [employer] message from patient requesting refill of oxycodone.     Last refill per chart review: 5/29  Last office visit: 5/2/25 (seen while inpatient)  Scheduled for follow up not scheduled due to lack of insurance.      Will route request to MD/DO for review.      Reviewed [place]  Report.

## 2025-06-11 ENCOUNTER — MYC REFILL (OUTPATIENT)
Dept: RADIATION ONCOLOGY | Facility: CLINIC | Age: 45
End: 2025-06-11

## 2025-06-11 DIAGNOSIS — R53.0 NEOPLASTIC MALIGNANT RELATED FATIGUE: ICD-10-CM

## 2025-06-11 DIAGNOSIS — C34.90 NON-SMALL CELL LUNG CANCER, UNSPECIFIED LATERALITY (H): ICD-10-CM

## 2025-06-11 DIAGNOSIS — C79.31 MALIGNANT NEOPLASM METASTATIC TO BRAIN (H): ICD-10-CM

## 2025-06-12 RX ORDER — METHYLPHENIDATE HYDROCHLORIDE 5 MG/1
5-10 TABLET ORAL 2 TIMES DAILY PRN
Qty: 90 TABLET | Refills: 0 | Status: SHIPPED | OUTPATIENT
Start: 2025-06-12

## 2025-06-12 NOTE — TELEPHONE ENCOUNTER
Received [email or web address] message from patient requesting refill of methylphenidate.     Last refill: 11/12/24  Last office visit: 5/2/25 (seen while inpatient)  Scheduled for follow up not scheduled due to lack of insurance. Meets with a financial counselor today.    Will route request to MD/DO for review.     Reviewed [place]  Report.

## 2025-06-13 ENCOUNTER — PATIENT OUTREACH (OUTPATIENT)
Dept: ONCOLOGY | Facility: CLINIC | Age: 45
End: 2025-06-13

## 2025-06-18 ENCOUNTER — PATIENT OUTREACH (OUTPATIENT)
Dept: ONCOLOGY | Facility: CLINIC | Age: 45
End: 2025-06-18

## 2025-06-18 NOTE — PROGRESS NOTES
Reached out to Pt and spouse to follow-up on insurance situation and refill requests. No answer at any contact #'s. Pt's VM is full, but I was able to leave a VM on spouses #.

## 2025-06-23 ENCOUNTER — PATIENT OUTREACH (OUTPATIENT)
Dept: CARE COORDINATION | Facility: CLINIC | Age: 45
End: 2025-06-23

## 2025-06-23 ENCOUNTER — MYC REFILL (OUTPATIENT)
Dept: PALLIATIVE CARE | Facility: CLINIC | Age: 45
End: 2025-06-23

## 2025-06-23 DIAGNOSIS — Z98.890 S/P CRANIOTOMY: ICD-10-CM

## 2025-06-23 DIAGNOSIS — D49.6 BRAIN TUMOR (H): ICD-10-CM

## 2025-06-23 NOTE — PROGRESS NOTES
Social Work - Telephone/MyChart message  [facility]    Patient Name: Connor Emerson  Goes By: Connor       /Age: 1980 (45 year old)      Notified by RNCC that patient still does not have insurance or MA pending. Sent mychart messaging giving resource on navigating MA - [employer] and our FAN.    Also checking in if any additional resource/support needs    Intervention: Sent patient MyChart message on 2025.   Plan:  will await patient's return phone call/message and provide assistance at that time.    LISETH Randall, [facility]   Adult Oncology - [place]  [phone]  Onsite [place] on    *Please note does not work on .   Support Groups at [facility]: Social Work Services for Cancer Patients ([email or web address])

## 2025-06-24 ENCOUNTER — TELEPHONE (OUTPATIENT)
Dept: PALLIATIVE CARE | Facility: CLINIC | Age: 45
End: 2025-06-24

## 2025-06-24 RX ORDER — OXYCODONE HYDROCHLORIDE 10 MG/1
10 TABLET ORAL
Qty: 120 TABLET | Refills: 0 | Status: SHIPPED | OUTPATIENT
Start: 2025-06-26

## 2025-06-24 NOTE — TELEPHONE ENCOUNTER
Received [employer] message from patient requesting refill of oxycodone.     Last refill: 6/12/25  Last office visit: 5/2/25 (seen while inpatient)  Scheduled for follow up not scheduled due to lack of insurance.     Will route request to MD/DO for review.     Reviewed [place]  [place].

## 2025-06-24 NOTE — TELEPHONE ENCOUNTER
Dr. Brewer will authorize pt's request for an oxycodone refill this week and one more in 2 weeks unless pt is seen in clinic.     "[employer] message sent to pt explaining this and encouraging him to start tapering the oxycodone if scheduling an appt is not an option.    VINH VillanuevaN, RN  Palliative Care Nurse Clinician    [phone] (Direct)  [phone] (Main)  [phone] (Appointment Scheduling)

## 2025-07-07 ENCOUNTER — MYC REFILL (OUTPATIENT)
Dept: PALLIATIVE CARE | Facility: CLINIC | Age: 45
End: 2025-07-07

## 2025-07-07 DIAGNOSIS — D49.6 BRAIN TUMOR (H): ICD-10-CM

## 2025-07-07 DIAGNOSIS — Z98.890 S/P CRANIOTOMY: ICD-10-CM

## 2025-07-08 RX ORDER — OXYCODONE HYDROCHLORIDE 10 MG/1
10 TABLET ORAL
Qty: 120 TABLET | Refills: 0 | Status: SHIPPED | OUTPATIENT
Start: 2025-07-10

## 2025-07-08 NOTE — TELEPHONE ENCOUNTER
Received [employer] message from patient requesting refill of oxycodone.     Last refill: 6/26/25  Last office visit: 5/2/25 (seen while inpatient)   Scheduled for follow up 7/16/25     Will route request to MD/ for review.     Reviewed [place]  Report.

## 2025-07-12 ENCOUNTER — HEALTH MAINTENANCE LETTER (OUTPATIENT)
Age: 45
End: 2025-07-12

## 2025-07-23 ENCOUNTER — PATIENT OUTREACH (OUTPATIENT)
Dept: CARE COORDINATION | Facility: CLINIC | Age: 45
End: 2025-07-23

## 2025-07-23 ENCOUNTER — VIRTUAL VISIT (OUTPATIENT)
Dept: PALLIATIVE CARE | Facility: CLINIC | Age: 45
End: 2025-07-23
Attending: FAMILY MEDICINE

## 2025-07-23 VITALS — BODY MASS INDEX: 32.49 KG/M2 | HEIGHT: 69 IN

## 2025-07-23 DIAGNOSIS — C34.90 NON-SMALL CELL LUNG CANCER, UNSPECIFIED LATERALITY (H): ICD-10-CM

## 2025-07-23 DIAGNOSIS — D49.6 BRAIN TUMOR (H): ICD-10-CM

## 2025-07-23 DIAGNOSIS — G89.3 CANCER ASSOCIATED PAIN: ICD-10-CM

## 2025-07-23 DIAGNOSIS — Z98.890 S/P CRANIOTOMY: ICD-10-CM

## 2025-07-23 DIAGNOSIS — R53.0 NEOPLASTIC MALIGNANT RELATED FATIGUE: Primary | ICD-10-CM

## 2025-07-23 RX ORDER — METHADONE HYDROCHLORIDE 5 MG/1
TABLET ORAL
Qty: 45 TABLET | Refills: 0 | Status: SHIPPED | OUTPATIENT
Start: 2025-07-23

## 2025-07-23 RX ORDER — OXYCODONE HYDROCHLORIDE 10 MG/1
10 TABLET ORAL
Qty: 120 TABLET | Refills: 0 | Status: SHIPPED | OUTPATIENT
Start: 2025-07-23

## 2025-07-23 ASSESSMENT — PAIN SCALES - GENERAL: PAINLEVEL_OUTOF10: SEVERE PAIN (8)

## 2025-07-23 ASSESSMENT — PATIENT HEALTH QUESTIONNAIRE - PHQ9: SUM OF ALL RESPONSES TO PHQ QUESTIONS 1-9: 19

## 2025-07-23 NOTE — PROGRESS NOTES
Virtual Visit Details    Type of service:  Video Visit     Originating Location (pt. Location): Home    Distant Location (provider location):  On-site  Platform used for Video Visit: [facility]    Palliative Care Outpatient Clinic Progress Note    Patient Name: Connor Emerson  Primary Provider: Jessica Mohr    Impression & Recommendations & Counseling:  Connor Emerson is a 45 year old male with history of NSCLC with ALK rearrangement and mets to the brain s/p gamma knife treatments and craniotomy open excision and currently on a second line TKI. He is experiencing 'stiffness' from the TKI and this is better overall and worse when he works. He has a lot of cancer related fatigue.  ECO  Decisional Capacity: very present     PDMP review:  Yes, no concerns     Metastatic NSCLC with ALK rearrangement  S/p GK to brain mets and craniotomy for excision  Cancer associated pain  Fibromyalgia-like somatic soreness--on duloxetine, flexeril and prozac  HTN  Headaches--overall better and Connor prefers to minimize use of steroids, if at all possible.  Cancer associated fatigue  Major Depression and suicidal ideation     Goals of Care: 2025  No change to GOC. 10/21/2024   No change to GOC.  2024  Connor feels like he is living on borrowed time--he was once told he had to live until about 2023 and he worries this may be his last summer and he doesn't want to spend it recovering from surgery or feeling poorly.  So he is going to do a PET scan and if it is negative he for sure would not want surgery.  He is swayed by surgeons feeling it is scar tissue from his previous chest tube.  If there is recurrent tumor Connor would consider radiation therapy and he is also thinking it might be time to put his focus on a comfortable death.  He seems to have a very sound decision making framework and he's just missing some data at this time.  2023  no change in GOC  3/1/2023 Connor wants to continue cancer-directed therapies  as long as the side effects are tolerable.  He is a FULL code should he have a cardiac arrest. He is OK being cared for in the acute care hospital if needed.     Recommendations & Counseling:  Continue cancer care per oncology team  RESUME methadone 2.5 mg q AM and 5 mg po q PM--new Rx sent  Continue oxycodone 5-10 mg po q 4 hours prn; refill sent   INCREASE Ritalin to 15 mg po BID until [facility] symptom check in 4 days. (This has an antidepressant effect so hopefully will help with his suicidal ideation).    Continue Duloxetine and cyclobenzaprine and Prozac as he is able to afford them  Narcan nasal spray rx also sent to pharmacy last visit  List of community counseling resources provided in the AVS and he is urged to make an appointment with someone.  List of sleep hygiene ideas provided in AVS  Due to insurance issues will keep a close eye on Connor via [facility] symptom checks.  Check TSH  Connor committed to not harming himself and I reminded him of the 781 Suicide and Crisis Lifeline to use at any time.      Contact information for Maame Montoya supplied in AVS--she left a message for him in May and he didn't follow up.    Counseling: All of the above was explained to the patient in lay language. The patient has verbalized a clear understanding of the discussion, asked appropriate questions, which have been answered to patient's apparent satisfaction. The patient is in agreement with the above plan.        Chief Complaint/Patient ID: Connor Emerson 45 year old male with PMHx of  NSCLC with ALK rearrangement and mets to the brain s/p gamma knife treatments and craniotomy open excision and currently on a second line TKI. He is experiencing 'stiffness' from the TKI that has improved with scheduled flexeril at  and he switched to lorlatinib in early July 2024    Last Palliative care appointment: 01/02/2025 with me     Reviewed:  Yes:   reviewed - controlled substances reflected in medication list..    Interim  History:  Connor Emerson is a 45 year old male who is seen today for follow up with Palliative Care via billable video visit. Connor has been suffering due to his lack of insurance--he reduced his Keppra dosing to make his prescriptions last longer and was admitted for seizures in early May.  He is not using methadone or Ritalin due to financial pressures.     He is struggling with 'moving forward with things like work. Some days he has trouble getting out of bed.  His boss has offered him a reduced scheduled it that might help him maintain his energy. His cancer associated fatigue is taking a big toll. He is very depressed with a PHQ-9 of 19 including suicidal ideation.  He has asked his wife to remove his firearms (she has)  so he doesn't do 'something stupid.' He has no other plans for committing suicide.    He says he is scheduled for another brain surgery but he doesn't want to because he has the impression it won't help prolong his life.  He doesn't want to die.  AND there are days he feels so exhausted he wishes he was dead.    Pain:  So-So control; he is not using methadone due to finances and would like a new rx for it    Appetite/Nausea: OK--weight is stable     Bowels: no concerns     Sleep: interrupted by swirling thoughts and worries.     Mood: very depressed and fatigued;      Coping:  struggling right now.    Family History- Reviewed in Epic.    Allergies   Allergen Reactions    Vicodin [Hydrocodone-Acetaminophen] Nausea and Vomiting and GI Disturbance       Social History:  Pertinent changes to social history/social situation since last visit: he is working but has lost his insurance  Key support resources: wife  Advance Directive Status:  no ACP documents in Epic    Social History     Tobacco Use    Smoking status: Never     Passive exposure: Never    Smokeless tobacco: Never   Vaping Use    Vaping status: Never Used   Substance Use Topics    Alcohol use: Yes     Alcohol/week: 0.0 standard drinks of  alcohol     Comment: social    Drug use: No         Allergies   Allergen Reactions    Vicodin [Hydrocodone-Acetaminophen] Nausea and Vomiting and GI Disturbance     Current Outpatient Medications   Medication Sig Dispense Refill    acetaminophen (TYLENOL) 325 MG tablet Take 325-650 mg by mouth every 6 hours as needed for mild pain      albuterol (PROAIR HFA/PROVENTIL HFA/VENTOLIN HFA) 108 (90 Base) MCG/ACT inhaler Inhale 2 puffs into the lungs every 6 hours as needed for shortness of breath, wheezing or cough 18 g 0    Alcohol Swabs PADS Use to swab the area of the injection or jabier as directed. Per insurance coverage 100 each 0    amLODIPine (NORVASC) 10 MG tablet Take 1 tablet (10 mg) by mouth daily. 90 tablet 3    blood glucose (NO BRAND SPECIFIED) lancets standard To use to test glucose level in the blood Use to test blood sugar  4  times daily as directed. To accompany glucose monitor brands per insurance coverage. 100 each 3    blood glucose (NO BRAND SPECIFIED) test strip To use to test glucose level in the blood Use to test blood sugar  4 times daily as directed. To accompany glucose monitor brands per insurance coverage. 100 strip 3    Blood Glucose Monitoring Suppl (ACCU-CHEK GUIDE) w/Device KIT       Continuous Glucose  (FREESTYLE IRENE 3 READER) YVES       Continuous Glucose Sensor (FREESTYLE IRENE 3 SENSOR) MISC 1 each every 14 days. Use 1 sensor every 14 days. Use to read blood sugars per 's instructions. 6 each 3    cyclobenzaprine (FLEXERIL) 5 MG tablet Take 2 tablets (10 mg) by mouth nightly as needed for muscle spasms. 30 tablet 5    DULoxetine (CYMBALTA) 30 MG capsule Take 1 capsule (30 mg) by mouth 2 times daily. 60 capsule 2    empagliflozin (JARDIANCE) 25 MG TABS tablet Take 1 tablet (25 mg) by mouth daily. 90 tablet 1    insulin aspart (NOVOLOG FLEXPEN) 100 UNIT/ML pen Carb Coverage 1:5 g cho 45 mL 11    insulin glargine (LANTUS PEN) 100 UNIT/ML pen Inject 48 Units  subcutaneously 2 times daily. 30 mL 1    insulin pen needle (32G X 4 MM) 32G X 4 MM miscellaneous Use as directed by provider. Per insurance coverage 100 each 0    levETIRAcetam (KEPPRA) 1000 MG tablet Take 1 tablet (1,000 mg) by mouth 2 times daily 60 tablet 11    levETIRAcetam (KEPPRA) 500 MG tablet Take 1 tablet (500 mg) by mouth 2 times daily. Add this to your prescribed 1000 mg dose twice daily to equate to 1500 mg twice daily. 90 tablet 0    lisinopril (ZESTRIL) 40 MG tablet Take 1 tablet (40 mg) by mouth daily. 90 tablet 0    metFORMIN (GLUCOPHAGE) 500 MG tablet Take 1 tablet (500 mg) by mouth 2 times daily (with meals) 60 tablet 3    methylphenidate (RITALIN) 5 MG tablet Take 1-2 tablets (5-10 mg) by mouth 2 times daily as needed (fatigue). Take second dose by 12 noon, if it is needed 90 tablet 0    naloxone (NARCAN) 4 MG/0.1ML nasal spray Spray 1 spray (4 mg) into one nostril alternating nostrils as needed for opioid reversal every 2-3 minutes until assistance arrives 0.2 mL 3    oxyCODONE IR (ROXICODONE) 10 MG tablet Take 1 tablet (10 mg) by mouth every 3 hours as needed for moderate pain. 120 tablet 0    prochlorperazine (COMPAZINE) 10 MG tablet Take 1 tablet (10 mg) by mouth every 6 hours as needed for nausea or vomiting 30 tablet 2    propranolol (INDERAL) 20 MG tablet Take 1 tablet (20 mg) by mouth 2 times daily 60 tablet 1    rivaroxaban ANTICOAGULANT (XARELTO) 20 MG TABS tablet Take 1 tablet (20 mg) by mouth daily (with dinner) 90 tablet 3    rosuvastatin (CRESTOR) 10 MG tablet Take 1 tablet (10 mg) by mouth daily. 90 tablet 2     Past Medical History:   Diagnosis Date    Atypical chest pain 12/02/2013    Cancer (H)     Complication of anesthesia     Diabetes (H)     GERD (gastroesophageal reflux disease) 12/02/2013    History of pulmonary embolism     HTN (hypertension) 05/14/2012    HTN, goal below 140/90 07/02/2013    Insomnia 02/21/2012    Mediastinal lymphadenopathy     Metastasis to brain (H)  2022    Migraine headache 07/02/2013    Migraine with aura, without mention of intractable migraine without mention of status migrainosus     Pneumonia      Past Surgical History:   Procedure Laterality Date    BRONCHOSCOPY RIGID OR FLEXIBLE W/TRANSENDOSCOPIC ENDOBRONCHIAL ULTRASOUND GUIDED Bilateral 1/26/2022    Procedure: Right BRONCHOSCOPY, FIBEROPTIC, endobronchial ultrasound, pleural biopsy;  Surgeon: Dallin Agrawal MD;  Location: [facility] OR    INJECT BLOCK MEDIAL BRANCH CERVICAL/THORACIC/LUMBAR      INSERT CHEST TUBE Right 2/16/2022    Procedure: INSERTION, CATHETER, INTERCOSTAL, FOR DRAINAGE;  Surgeon: Dallin Agrawal MD;  Location: [facility] GI    INSERT CHEST TUBE Right 3/9/2022    Procedure: INSERTION, CATHETER, INTERCOSTAL, FOR DRAINAGE;  Surgeon: Sushila Antonio MD;  Location: [facility] GI    IR CHEST TUBE REMOVAL TUNNELED RIGHT  4/2/2022    OPTICAL TRACKING SYSTEM CRANIOTOMY, EXCISE TUMOR, COMBINED Left 6/28/2022    Procedure: Left stealth craniotomy for tumor resection with motor mapping;  Surgeon: Stephen Moran MD;  Location:  OR    OPTICAL TRACKING SYSTEM CRANIOTOMY, EXCISE TUMOR, COMBINED Right 6/27/2023    Procedure: Right stealth craniotomy and resection of tumor;  Surgeon: Stephen Moran MD;  Location:  OR    ORTHOPEDIC SURGERY      Ganesh. Rotator cuff repair.    PLEUROSCOPY N/A 1/26/2022    Procedure: Pleuroscopy with Pleural Biopsy;  Surgeon: Dallin Agrawal MD;  Location: [facility] OR       Physical Exam:   GENERAL APPEARANCE: robust appearing, alert and no distress; neatly groomed  EYES: Eyes grossly normal to inspection, PERRLA, conjunctivae and sclerae without injection or discharge, EOM intact   RESP:  no increased work of breathing; speaks in complete sentences;   MS: No musculoskeletal defects are noted  SKIN: No suspicious lesions or rashes, hydration status appears adequate with normal skin turgor   PSYCH: Alert and oriented x3; speech- coherent , normal rate and volume; able to articulate  logical thoughts, able to abstract reason, no tangential thoughts, no hallucinations or delusions, mentation appears normal, Mood is sad. Affect is appropriate for this mood state and constricted. Thought content includes suicidal ideation, and no hallucinations, and delusions.  Eye contact is good during conversation.       Key Data Reviewed:  LABS:  Cr 0.51, Albumin 4.0,  last SH was 2022    IMAGING: no recent imaging completed    22 minutes spent on the date of the encounter doing chart review, history and exam, patient education & counseling, documentation and other activities as noted above.    Ajith Brewer MD MS FAAFP CAQHPM  [facility]  Office [phone]  Fax [phone]

## 2025-07-23 NOTE — NURSING NOTE
Current patient location: [address] [place], [place]     Is the patient currently in the state of [place]? YES    Visit mode: VIDEO    If the visit is dropped, the patient can be reconnected by:VIDEO VISIT: Text to cell phone:   Telephone Information:   Mobile [phone]       Will anyone else be joining the visit? NO  (If patient encounters technical issues they should call [phone]    Are changes needed to the allergy or medication list? Yes  Novolog pen prn  Keppra-1000 mg in the morning and 1000 mg in the evening.     Are refills needed on medications prescribed by this physician? YES  Oxycodone 10 mg     Rooming Documentation:  Questionnaire(s) completed     Depression Response    Patient completed the PHQ-9 assessment for depression and scored >9? Yes  Question 9 on the PHQ-9 was positive for suicidality? Yes  Does patient have current mental health provider? No    Is this a virtual visit? Yes   Does patient have suicidal ideation (positive question 9)? Yes (adult) - transfer to Red Flag Triage ([phone]) Patient declined transfer.  Notify provider.     I personally notified the following: visit provider Pt would like a mental health provider referral.       Reason for visit: RECHECK (Return Palliative )    Sravanthi OCONNOR

## 2025-07-23 NOTE — PROGRESS NOTES
Social Work - Distress Screen Intervention  [facility]    Identified Concern and Score from Distress Screenin. How concerned are you about your ability to eat? 0     2. How concerned are you about unintended weight loss or your current weight? 4     3. How concerned are you about feeling depressed or very sad?  7     4. How concerned are you about feeling anxious or very scared?  (!) 9     5. Do you struggle with the loss of meaning and jesus in your life?  (!) A great deal     6. How concerned are you about work and home life issues that may be affected by your cancer?  (!) 10     7. How concerned are you about knowing what resources are available to help you?  (!) 8     8. Do you currently have what you would describe as Anabaptism or spiritual struggles? Not at all     9. If you want to be contacted by one of our professionals, I can send a message to them right now.  -- (Pt declined.)       Date of Distress Screen: 25  Data: At time of last visit, patient scored positive on distress screening.  outreached to patient today to follow up on elevated distress and introduce psychosocial services and support.  Intervention/Education provided:  contacted patient by emmett - patient had declined follow up.  Follow-up Required:  will remain available to patient for support as needed    LISETH Randall, [facility]   Adult Oncology - [place]  [phone]  Onsite [place] on    *Please note does not work on .   Support Groups at [facility]: Social Work Services for Cancer Patients ([email or web address])

## 2025-07-23 NOTE — PATIENT INSTRUCTIONS
It was good to see you today, Connor.  I'm glad we could talk to one another.    Here are the things we talked about:   Call Maame Montoya MSW, [facility]   Adult Oncology - [facility]  [phone] for help with accessing insurance resources.    I also ordered a thyroid test because if that is not working well it might account for some of your fatigue and some cancer treatments can affect how well it works.    Increase the Ritalin to 3 pills in the morning and 3 in the afternoon. Reyna will call Monday to check in and see if we need to go to 4 pills in morning and 4 at noon.  And once we know the dose you need I'll put in a new prescription for a bigger strength pill.    If you are feeling acutely suicidal please go to an ED or call [facility] Suicide and Crisis Lifeline  [facility] Resource List   Name  Contact Info Areas of focus  Notes    [facility]  [phone]  [email or web address] Treatment of children, adolescents and young adults     [facility]  [address], [place]  [phone]  Serious illness, grief and loss, ambiguous grief, trauma     Abigail Bass, [facility] [place]   [phone]  [email or web address] illness and related grief     Steffany Aguirre, MSW, [facility]  [phone]  Telehealth only    LISETH Jarquin [facility] Aguila Oliveira, [facility]  [facility]  Serious illness, trauma, has palliative care experience  In person and telehealth    [facility]  [phone]  [email or web address]     Dr Dulce Acevedo [address]  [place], [place]  [email or web address]  [phone] Anxiety / depression, trauma for pt's with chronic / terminal illness; grief/loss; relationship repair, mindfulness/mind body work    Erma Beckwith [phone] Couples, eol/serious illness; Muslim focus     Giselle Mac  [phone] Couples /  eol/serious illness; spiritual direction     [email or web address] [email or web address]  anxiety, depression, serious illness, Islam focus   experience with palliative care and children Telehealth only   [facility] [email or web address] Grief and loss, caregiving, dementia coaching, support and education     [facility]  [facility] [place], [place]  Therapist Tess Torres is highly recommended    Giselle Cevallos [place]  [phone]     Angelicaernie Cardozo [place]  [phone]          How to get a hold of us:  For non-urgent matters, MyChart works best.    For more urgent matters, or if you prefer not to use MyChart, call our clinic nurse coordinator Reyna TEAGUE at [phone]    We have an on-call number for evenings and weekends. Please call this only if you are having uncontrolled symptoms or serious side effects from your medicines: [phone].     For refills, please give us a week (5 working days) notice. We don't always have providers available everyday to do refills. If you call the day you run out of your medicine, we may not be able to refill it in time, so call 5 days in advance!    Ajith Brewer MD MS FAAFP CAQHPM  [facility]  Office [phone]  Fax [phone]

## 2025-07-23 NOTE — LETTER
2025       RE: Connor Emreson  [address]  [place] [address]     Dear Colleague,    Thank you for referring your patient, Connor Emerson, to the [facility]. Please see a copy of my visit note below.    Virtual Visit Details    Type of service:  Video Visit     Originating Location (pt. Location): Home    Distant Location (provider location):  On-site  Platform used for Video Visit: [facility]    Palliative Care Outpatient Clinic Progress Note    Patient Name: Connor Emerson  Primary Provider: Jessica Mohr    Impression & Recommendations & Counseling:  Connor Emerson is a 45 year old male with history of NSCLC with ALK rearrangement and mets to the brain s/p gamma knife treatments and craniotomy open excision and currently on a second line TKI. He is experiencing 'stiffness' from the TKI and this is better overall and worse when he works. He has a lot of cancer related fatigue.  ECO  Decisional Capacity: very present     PDMP review:  Yes, no concerns     Metastatic NSCLC with ALK rearrangement  S/p GK to brain mets and craniotomy for excision  Cancer associated pain  Fibromyalgia-like somatic soreness--on duloxetine, flexeril and prozac  HTN  Headaches--overall better and Connor prefers to minimize use of steroids, if at all possible.  Cancer associated fatigue  Major Depression and suicidal ideation     Goals of Care: 2025  No change to GOC. 10/21/2024   No change to GOC.  2024  Connor feels like he is living on borrowed time--he was once told he had to live until about 2023 and he worries this may be his last summer and he doesn't want to spend it recovering from surgery or feeling poorly.  So he is going to do a PET scan and if it is negative he for sure would not want surgery.  He is swayed by surgeons feeling it is scar tissue from his previous chest tube.  If there is recurrent tumor  Connor would consider radiation therapy and he is also thinking it might be time to put his focus on a comfortable death.  He seems to have a very sound decision making framework and he's just missing some data at this time.  12/19/2023  no change in GOC  3/1/2023 Connor wants to continue cancer-directed therapies as long as the side effects are tolerable.  He is a FULL code should he have a cardiac arrest. He is OK being cared for in the acute care hospital if needed.     Recommendations & Counseling:  Continue cancer care per oncology team  RESUME methadone 2.5 mg q AM and 5 mg po q PM--new Rx sent  Continue oxycodone 5-10 mg po q 4 hours prn; refill sent   INCREASE Ritalin to 15 mg po BID until [facility] symptom check in 4 days. (This has an antidepressant effect so hopefully will help with his suicidal ideation).    Continue Duloxetine and cyclobenzaprine and Prozac as he is able to afford them  Narcan nasal spray rx also sent to pharmacy last visit  List of community counseling resources provided in the AVS and he is urged to make an appointment with someone.  List of sleep hygiene ideas provided in AVS  Due to insurance issues will keep a close eye on Connor via [facility] symptom checks.  Check TSH  Connor committed to not harming himself and I reminded him of the 988 Suicide and Crisis Lifeline to use at any time.      Contact information for Maame Jan supplied in AVS--she left a message for him in May and he didn't follow up.    Counseling: All of the above was explained to the patient in lay language. The patient has verbalized a clear understanding of the discussion, asked appropriate questions, which have been answered to patient's apparent satisfaction. The patient is in agreement with the above plan.        Chief Complaint/Patient ID: Connor Emerson 45 year old male with PMHx of  NSCLC with ALK rearrangement and mets to the brain s/p gamma knife treatments and craniotomy open excision and currently on a second line  TKI. He is experiencing 'stiffness' from the TKI that has improved with scheduled flexeril at  and he switched to lorlatinib in early July 2024    Last Palliative care appointment: 01/02/2025 with me     Reviewed:  Yes:   reviewed - controlled substances reflected in medication list..    Interim History:  Connor Emerson is a 45 year old male who is seen today for follow up with Palliative Care via billable video visit. Connor has been suffering due to his lack of insurance--he reduced his Keppra dosing to make his prescriptions last longer and was admitted for seizures in early May.  He is not using methadone or Ritalin due to financial pressures.     He is struggling with 'moving forward with things like work. Some days he has trouble getting out of bed.  His boss has offered him a reduced scheduled it that might help him maintain his energy. His cancer associated fatigue is taking a big toll. He is very depressed with a PHQ-9 of 19 including suicidal ideation.  He has asked his wife to remove his firearms (she has)  so he doesn't do 'something stupid.' He has no other plans for committing suicide.    He says he is scheduled for another brain surgery but he doesn't want to because he has the impression it won't help prolong his life.  He doesn't want to die.  AND there are days he feels so exhausted he wishes he was dead.    Pain:  So-So control; he is not using methadone due to finances and would like a new rx for it    Appetite/Nausea: OK--weight is stable     Bowels: no concerns     Sleep: interrupted by swirling thoughts and worries.     Mood: very depressed and fatigued;      Coping:  struggling right now.    Family History- Reviewed in Epic.    Allergies   Allergen Reactions     Vicodin [Hydrocodone-Acetaminophen] Nausea and Vomiting and GI Disturbance       Social History:  Pertinent changes to social history/social situation since last visit: he is working but has lost his insurance  Key support  resources: wife  Advance Directive Status:  no ACP documents in Epic    Social History     Tobacco Use     Smoking status: Never     Passive exposure: Never     Smokeless tobacco: Never   Vaping Use     Vaping status: Never Used   Substance Use Topics     Alcohol use: Yes     Alcohol/week: 0.0 standard drinks of alcohol     Comment: social     Drug use: No         Allergies   Allergen Reactions     Vicodin [Hydrocodone-Acetaminophen] Nausea and Vomiting and GI Disturbance     Current Outpatient Medications   Medication Sig Dispense Refill     acetaminophen (TYLENOL) 325 MG tablet Take 325-650 mg by mouth every 6 hours as needed for mild pain       albuterol (PROAIR HFA/PROVENTIL HFA/VENTOLIN HFA) 108 (90 Base) MCG/ACT inhaler Inhale 2 puffs into the lungs every 6 hours as needed for shortness of breath, wheezing or cough 18 g 0     Alcohol Swabs PADS Use to swab the area of the injection or jabier as directed. Per insurance coverage 100 each 0     amLODIPine (NORVASC) 10 MG tablet Take 1 tablet (10 mg) by mouth daily. 90 tablet 3     blood glucose (NO BRAND SPECIFIED) lancets standard To use to test glucose level in the blood Use to test blood sugar  4  times daily as directed. To accompany glucose monitor brands per insurance coverage. 100 each 3     blood glucose (NO BRAND SPECIFIED) test strip To use to test glucose level in the blood Use to test blood sugar  4 times daily as directed. To accompany glucose monitor brands per insurance coverage. 100 strip 3     Blood Glucose Monitoring Suppl (ACCU-CHEK GUIDE) w/Device KIT        Continuous Glucose  (FREESTYLE IRENE 3 READER) YVES        Continuous Glucose Sensor (FREESTYLE IRENE 3 SENSOR) MISC 1 each every 14 days. Use 1 sensor every 14 days. Use to read blood sugars per 's instructions. 6 each 3     cyclobenzaprine (FLEXERIL) 5 MG tablet Take 2 tablets (10 mg) by mouth nightly as needed for muscle spasms. 30 tablet 5     DULoxetine (CYMBALTA)  30 MG capsule Take 1 capsule (30 mg) by mouth 2 times daily. 60 capsule 2     empagliflozin (JARDIANCE) 25 MG TABS tablet Take 1 tablet (25 mg) by mouth daily. 90 tablet 1     insulin aspart (NOVOLOG FLEXPEN) 100 UNIT/ML pen Carb Coverage 1:5 g cho 45 mL 11     insulin glargine (LANTUS PEN) 100 UNIT/ML pen Inject 48 Units subcutaneously 2 times daily. 30 mL 1     insulin pen needle (32G X 4 MM) 32G X 4 MM miscellaneous Use as directed by provider. Per insurance coverage 100 each 0     levETIRAcetam (KEPPRA) 1000 MG tablet Take 1 tablet (1,000 mg) by mouth 2 times daily 60 tablet 11     levETIRAcetam (KEPPRA) 500 MG tablet Take 1 tablet (500 mg) by mouth 2 times daily. Add this to your prescribed 1000 mg dose twice daily to equate to 1500 mg twice daily. 90 tablet 0     lisinopril (ZESTRIL) 40 MG tablet Take 1 tablet (40 mg) by mouth daily. 90 tablet 0     metFORMIN (GLUCOPHAGE) 500 MG tablet Take 1 tablet (500 mg) by mouth 2 times daily (with meals) 60 tablet 3     methylphenidate (RITALIN) 5 MG tablet Take 1-2 tablets (5-10 mg) by mouth 2 times daily as needed (fatigue). Take second dose by 12 noon, if it is needed 90 tablet 0     naloxone (NARCAN) 4 MG/0.1ML nasal spray Spray 1 spray (4 mg) into one nostril alternating nostrils as needed for opioid reversal every 2-3 minutes until assistance arrives 0.2 mL 3     oxyCODONE IR (ROXICODONE) 10 MG tablet Take 1 tablet (10 mg) by mouth every 3 hours as needed for moderate pain. 120 tablet 0     prochlorperazine (COMPAZINE) 10 MG tablet Take 1 tablet (10 mg) by mouth every 6 hours as needed for nausea or vomiting 30 tablet 2     propranolol (INDERAL) 20 MG tablet Take 1 tablet (20 mg) by mouth 2 times daily 60 tablet 1     rivaroxaban ANTICOAGULANT (XARELTO) 20 MG TABS tablet Take 1 tablet (20 mg) by mouth daily (with dinner) 90 tablet 3     rosuvastatin (CRESTOR) 10 MG tablet Take 1 tablet (10 mg) by mouth daily. 90 tablet 2     Past Medical History:   Diagnosis Date      Atypical chest pain 12/02/2013     Cancer (H)      Complication of anesthesia      Diabetes (H)      GERD (gastroesophageal reflux disease) 12/02/2013     History of pulmonary embolism      HTN (hypertension) 05/14/2012     HTN, goal below 140/90 07/02/2013     Insomnia 02/21/2012     Mediastinal lymphadenopathy      Metastasis to brain (H) 2022     Migraine headache 07/02/2013     Migraine with aura, without mention of intractable migraine without mention of status migrainosus      Pneumonia      Past Surgical History:   Procedure Laterality Date     BRONCHOSCOPY RIGID OR FLEXIBLE W/TRANSENDOSCOPIC ENDOBRONCHIAL ULTRASOUND GUIDED Bilateral 1/26/2022    Procedure: Right BRONCHOSCOPY, FIBEROPTIC, endobronchial ultrasound, pleural biopsy;  Surgeon: Dallin Agrawal MD;  Location: [facility] OR     INJECT BLOCK MEDIAL BRANCH CERVICAL/THORACIC/LUMBAR       INSERT CHEST TUBE Right 2/16/2022    Procedure: INSERTION, CATHETER, INTERCOSTAL, FOR DRAINAGE;  Surgeon: Dallin Agrawal MD;  Location: [facility] GI     INSERT CHEST TUBE Right 3/9/2022    Procedure: INSERTION, CATHETER, INTERCOSTAL, FOR DRAINAGE;  Surgeon: Sushila Antonio MD;  Location: [facility] GI     IR CHEST TUBE REMOVAL TUNNELED RIGHT  4/2/2022     OPTICAL TRACKING SYSTEM CRANIOTOMY, EXCISE TUMOR, COMBINED Left 6/28/2022    Procedure: Left stealth craniotomy for tumor resection with motor mapping;  Surgeon: Stephen Moran MD;  Location:  OR     OPTICAL TRACKING SYSTEM CRANIOTOMY, EXCISE TUMOR, COMBINED Right 6/27/2023    Procedure: Right stealth craniotomy and resection of tumor;  Surgeon: Stephen Moran MD;  Location:  OR     ORTHOPEDIC SURGERY      Ganesh. Rotator cuff repair.     PLEUROSCOPY N/A 1/26/2022    Procedure: Pleuroscopy with Pleural Biopsy;  Surgeon: Dallin Agrawal MD;  Location: [facility] OR       Physical Exam:   GENERAL APPEARANCE: robust appearing, alert and no distress; neatly groomed  EYES: Eyes grossly normal to inspection, PERRLA, conjunctivae and  sclerae without injection or discharge, EOM intact   RESP:  no increased work of breathing; speaks in complete sentences;   MS: No musculoskeletal defects are noted  SKIN: No suspicious lesions or rashes, hydration status appears adequate with normal skin turgor   PSYCH: Alert and oriented x3; speech- coherent , normal rate and volume; able to articulate logical thoughts, able to abstract reason, no tangential thoughts, no hallucinations or delusions, mentation appears normal, Mood is sad. Affect is appropriate for this mood state and constricted. Thought content includes suicidal ideation, and no hallucinations, and delusions.  Eye contact is good during conversation.       Key Data Reviewed:  LABS:  Cr 0.51, Albumin 4.0,  last SH was 2022    IMAGING: no recent imaging completed    22 minutes spent on the date of the encounter doing chart review, history and exam, patient education & counseling, documentation and other activities as noted above.    Ajith Brewer MD MS FAAFP CAQHPM  [facility]  Office [phone]  Fax [phone]     Again, thank you for allowing me to participate in the care of your patient.      Sincerely,    Ajith Brewer MD

## 2025-08-03 ENCOUNTER — HOSPITAL ENCOUNTER (EMERGENCY)
Facility: CLINIC | Age: 45
Discharge: HOME | End: 2025-08-03
Attending: STUDENT IN AN ORGANIZED HEALTH CARE EDUCATION/TRAINING PROGRAM

## 2025-08-03 VITALS
BODY MASS INDEX: 32.59 KG/M2 | DIASTOLIC BLOOD PRESSURE: 102 MMHG | WEIGHT: 220.02 LBS | HEART RATE: 89 BPM | TEMPERATURE: 98.3 F | HEIGHT: 69 IN | OXYGEN SATURATION: 95 % | RESPIRATION RATE: 106 BRPM | SYSTOLIC BLOOD PRESSURE: 162 MMHG

## 2025-08-03 DIAGNOSIS — R56.9 SEIZURE-LIKE ACTIVITY (H): Primary | ICD-10-CM

## 2025-08-03 LAB
ANION GAP SERPL CALCULATED.3IONS-SCNC: 15 MMOL/L (ref 7–15)
BASOPHILS # BLD AUTO: 0 10E3/UL (ref 0–0.2)
BASOPHILS NFR BLD AUTO: 1 %
BUN SERPL-MCNC: 9 MG/DL (ref 6–20)
CALCIUM SERPL-MCNC: 9.7 MG/DL (ref 8.8–10.4)
CHLORIDE SERPL-SCNC: 99 MMOL/L (ref 98–107)
CREAT SERPL-MCNC: 0.58 MG/DL (ref 0.67–1.17)
EGFRCR SERPLBLD CKD-EPI 2021: >90 ML/MIN/1.73M2
EOSINOPHIL # BLD AUTO: 0.2 10E3/UL (ref 0–0.7)
EOSINOPHIL NFR BLD AUTO: 3 %
ERYTHROCYTE [DISTWIDTH] IN BLOOD BY AUTOMATED COUNT: 12.9 % (ref 10–15)
FLUAV RNA SPEC QL NAA+PROBE: NEGATIVE
FLUBV RNA RESP QL NAA+PROBE: NEGATIVE
GLUCOSE BLDC GLUCOMTR-MCNC: 315 MG/DL (ref 70–99)
GLUCOSE BLDC GLUCOMTR-MCNC: 443 MG/DL (ref 70–99)
GLUCOSE SERPL-MCNC: 543 MG/DL (ref 70–99)
HCO3 SERPL-SCNC: 23 MMOL/L (ref 22–29)
HCT VFR BLD AUTO: 44.1 % (ref 40–53)
HGB BLD-MCNC: 15.3 G/DL (ref 13.3–17.7)
HOLD SPECIMEN: NORMAL
IMM GRANULOCYTES # BLD: 0 10E3/UL
IMM GRANULOCYTES NFR BLD: 0 %
LACTATE SERPL-SCNC: 1.9 MMOL/L (ref 0.7–2)
LIPASE SERPL-CCNC: 45 U/L (ref 13–60)
LYMPHOCYTES # BLD AUTO: 1.6 10E3/UL (ref 0.8–5.3)
LYMPHOCYTES NFR BLD AUTO: 29 %
MCH RBC QN AUTO: 30.3 PG (ref 26.5–33)
MCHC RBC AUTO-ENTMCNC: 34.7 G/DL (ref 31.5–36.5)
MCV RBC AUTO: 87 FL (ref 78–100)
MONOCYTES # BLD AUTO: 0.5 10E3/UL (ref 0–1.3)
MONOCYTES NFR BLD AUTO: 8 %
NEUTROPHILS # BLD AUTO: 3.3 10E3/UL (ref 1.6–8.3)
NEUTROPHILS NFR BLD AUTO: 59 %
NRBC # BLD AUTO: 0 10E3/UL
NRBC BLD AUTO-RTO: 0 /100
PLATELET # BLD AUTO: 147 10E3/UL (ref 150–450)
POTASSIUM SERPL-SCNC: 3.6 MMOL/L (ref 3.4–5.3)
PROLACTIN SERPL 3RD IS-MCNC: 6 NG/ML (ref 4–15)
RBC # BLD AUTO: 5.05 10E6/UL (ref 4.4–5.9)
RSV RNA SPEC NAA+PROBE: NEGATIVE
SARS-COV-2 RNA RESP QL NAA+PROBE: NEGATIVE
SODIUM SERPL-SCNC: 137 MMOL/L (ref 135–145)
WBC # BLD AUTO: 5.5 10E3/UL (ref 4–11)

## 2025-08-03 PROCEDURE — 96374 THER/PROPH/DIAG INJ IV PUSH: CPT | Performed by: STUDENT IN AN ORGANIZED HEALTH CARE EDUCATION/TRAINING PROGRAM

## 2025-08-03 PROCEDURE — 83605 ASSAY OF LACTIC ACID: CPT | Performed by: STUDENT IN AN ORGANIZED HEALTH CARE EDUCATION/TRAINING PROGRAM

## 2025-08-03 PROCEDURE — 84146 ASSAY OF PROLACTIN: CPT | Performed by: STUDENT IN AN ORGANIZED HEALTH CARE EDUCATION/TRAINING PROGRAM

## 2025-08-03 PROCEDURE — 85048 AUTOMATED LEUKOCYTE COUNT: CPT | Performed by: STUDENT IN AN ORGANIZED HEALTH CARE EDUCATION/TRAINING PROGRAM

## 2025-08-03 PROCEDURE — 999N000285 HC STATISTIC VASC ACCESS LAB DRAW WITH PIV START

## 2025-08-03 PROCEDURE — 258N000003 HC RX IP 258 OP 636: Performed by: STUDENT IN AN ORGANIZED HEALTH CARE EDUCATION/TRAINING PROGRAM

## 2025-08-03 PROCEDURE — 999N000127 HC STATISTIC PERIPHERAL IV START W US GUIDANCE

## 2025-08-03 PROCEDURE — 96361 HYDRATE IV INFUSION ADD-ON: CPT | Performed by: STUDENT IN AN ORGANIZED HEALTH CARE EDUCATION/TRAINING PROGRAM

## 2025-08-03 PROCEDURE — 250N000011 HC RX IP 250 OP 636: Performed by: STUDENT IN AN ORGANIZED HEALTH CARE EDUCATION/TRAINING PROGRAM

## 2025-08-03 PROCEDURE — 93005 ELECTROCARDIOGRAM TRACING: CPT | Performed by: STUDENT IN AN ORGANIZED HEALTH CARE EDUCATION/TRAINING PROGRAM

## 2025-08-03 PROCEDURE — 93010 ELECTROCARDIOGRAM REPORT: CPT | Performed by: STUDENT IN AN ORGANIZED HEALTH CARE EDUCATION/TRAINING PROGRAM

## 2025-08-03 PROCEDURE — 99284 EMERGENCY DEPT VISIT MOD MDM: CPT | Performed by: STUDENT IN AN ORGANIZED HEALTH CARE EDUCATION/TRAINING PROGRAM

## 2025-08-03 PROCEDURE — 82962 GLUCOSE BLOOD TEST: CPT

## 2025-08-03 PROCEDURE — 96372 THER/PROPH/DIAG INJ SC/IM: CPT

## 2025-08-03 PROCEDURE — 83690 ASSAY OF LIPASE: CPT | Performed by: STUDENT IN AN ORGANIZED HEALTH CARE EDUCATION/TRAINING PROGRAM

## 2025-08-03 PROCEDURE — 36415 COLL VENOUS BLD VENIPUNCTURE: CPT | Performed by: INTERNAL MEDICINE

## 2025-08-03 PROCEDURE — 36415 COLL VENOUS BLD VENIPUNCTURE: CPT | Performed by: STUDENT IN AN ORGANIZED HEALTH CARE EDUCATION/TRAINING PROGRAM

## 2025-08-03 PROCEDURE — 87637 SARSCOV2&INF A&B&RSV AMP PRB: CPT | Performed by: INTERNAL MEDICINE

## 2025-08-03 PROCEDURE — 99284 EMERGENCY DEPT VISIT MOD MDM: CPT | Mod: 25 | Performed by: STUDENT IN AN ORGANIZED HEALTH CARE EDUCATION/TRAINING PROGRAM

## 2025-08-03 PROCEDURE — 80048 BASIC METABOLIC PNL TOTAL CA: CPT | Performed by: STUDENT IN AN ORGANIZED HEALTH CARE EDUCATION/TRAINING PROGRAM

## 2025-08-03 PROCEDURE — 250N000012 HC RX MED GY IP 250 OP 636 PS 637: Performed by: STUDENT IN AN ORGANIZED HEALTH CARE EDUCATION/TRAINING PROGRAM

## 2025-08-03 RX ORDER — TRAZODONE HYDROCHLORIDE 50 MG/1
25-50 TABLET ORAL AT BEDTIME
Qty: 14 TABLET | Refills: 0 | Status: SHIPPED | OUTPATIENT
Start: 2025-08-03 | End: 2025-08-17

## 2025-08-03 RX ORDER — LEVETIRACETAM 500 MG/1
500 TABLET ORAL 2 TIMES DAILY
Qty: 60 TABLET | Refills: 1 | Status: SHIPPED | OUTPATIENT
Start: 2025-08-03 | End: 2025-08-05

## 2025-08-03 RX ORDER — LEVETIRACETAM 500 MG/5ML
2000 INJECTION, SOLUTION, CONCENTRATE INTRAVENOUS ONCE
Status: COMPLETED | OUTPATIENT
Start: 2025-08-03 | End: 2025-08-03

## 2025-08-03 RX ADMIN — LEVETIRACETAM 2000 MG: 100 INJECTION INTRAVENOUS at 14:41

## 2025-08-03 RX ADMIN — INSULIN ASPART 4 UNITS: 100 INJECTION, SOLUTION INTRAVENOUS; SUBCUTANEOUS at 17:08

## 2025-08-03 RX ADMIN — SODIUM CHLORIDE 1000 ML: 0.9 INJECTION, SOLUTION INTRAVENOUS at 16:31

## 2025-08-03 ASSESSMENT — ACTIVITIES OF DAILY LIVING (ADL)
ADLS_ACUITY_SCORE: 57
ADLS_ACUITY_SCORE: 56
ADLS_ACUITY_SCORE: 57
ADLS_ACUITY_SCORE: 56

## 2025-08-03 ASSESSMENT — VISUAL ACUITY
OS: 20/20;WITHOUT CORRECTIVE LENSES
OD: 20/20;WITHOUT CORRECTIVE LENSES

## 2025-08-04 ENCOUNTER — PATIENT OUTREACH (OUTPATIENT)
Dept: CARE COORDINATION | Facility: CLINIC | Age: 45
End: 2025-08-04

## 2025-08-04 LAB
ATRIAL RATE - MUSE: 97 BPM
DIASTOLIC BLOOD PRESSURE - MUSE: NORMAL MMHG
INTERPRETATION ECG - MUSE: NORMAL
P AXIS - MUSE: 21 DEGREES
PR INTERVAL - MUSE: 118 MS
QRS DURATION - MUSE: 86 MS
QT - MUSE: 386 MS
QTC - MUSE: 490 MS
R AXIS - MUSE: 12 DEGREES
SYSTOLIC BLOOD PRESSURE - MUSE: NORMAL MMHG
T AXIS - MUSE: 28 DEGREES
VENTRICULAR RATE- MUSE: 97 BPM

## 2025-08-05 ENCOUNTER — MYC REFILL (OUTPATIENT)
Dept: ONCOLOGY | Facility: CLINIC | Age: 45
End: 2025-08-05

## 2025-08-05 ENCOUNTER — MYC REFILL (OUTPATIENT)
Dept: PALLIATIVE CARE | Facility: CLINIC | Age: 45
End: 2025-08-05

## 2025-08-05 DIAGNOSIS — Z98.890 S/P CRANIOTOMY: ICD-10-CM

## 2025-08-05 DIAGNOSIS — D49.6 BRAIN TUMOR (H): ICD-10-CM

## 2025-08-05 DIAGNOSIS — G89.3 CANCER ASSOCIATED PAIN: ICD-10-CM

## 2025-08-05 RX ORDER — LEVETIRACETAM 500 MG/1
500 TABLET ORAL 2 TIMES DAILY
Qty: 60 TABLET | Refills: 1 | Status: SHIPPED | OUTPATIENT
Start: 2025-08-05

## 2025-08-05 RX ORDER — OXYCODONE HYDROCHLORIDE 10 MG/1
10 TABLET ORAL
Qty: 120 TABLET | Refills: 0 | Status: SHIPPED | OUTPATIENT
Start: 2025-08-06

## 2025-08-06 ENCOUNTER — PATIENT OUTREACH (OUTPATIENT)
Dept: CARE COORDINATION | Facility: CLINIC | Age: 45
End: 2025-08-06

## 2025-08-06 ENCOUNTER — MYC MEDICAL ADVICE (OUTPATIENT)
Dept: ONCOLOGY | Facility: CLINIC | Age: 45
End: 2025-08-06

## 2025-08-06 RX ORDER — LEVETIRACETAM 1000 MG/1
1000 TABLET ORAL 2 TIMES DAILY
Qty: 60 TABLET | Refills: 11 | Status: SHIPPED | OUTPATIENT
Start: 2025-08-06

## 2025-08-17 ENCOUNTER — MYC REFILL (OUTPATIENT)
Dept: PALLIATIVE CARE | Facility: CLINIC | Age: 45
End: 2025-08-17

## 2025-08-17 DIAGNOSIS — G89.3 CANCER ASSOCIATED PAIN: ICD-10-CM

## 2025-08-17 DIAGNOSIS — Z98.890 S/P CRANIOTOMY: ICD-10-CM

## 2025-08-17 DIAGNOSIS — D49.6 BRAIN TUMOR (H): ICD-10-CM

## 2025-08-19 RX ORDER — OXYCODONE HYDROCHLORIDE 10 MG/1
10 TABLET ORAL
Qty: 120 TABLET | Refills: 0 | Status: SHIPPED | OUTPATIENT
Start: 2025-08-20

## 2025-09-01 ENCOUNTER — APPOINTMENT (OUTPATIENT)
Dept: NEUROLOGY | Facility: CLINIC | Age: 45
End: 2025-09-01

## 2025-09-01 ENCOUNTER — HOSPITAL ENCOUNTER (INPATIENT)
Facility: CLINIC | Age: 45
LOS: 2 days | Discharge: HOME | End: 2025-09-03
Attending: EMERGENCY MEDICINE | Admitting: INTERNAL MEDICINE

## 2025-09-01 ENCOUNTER — APPOINTMENT (OUTPATIENT)
Dept: MRI IMAGING | Facility: CLINIC | Age: 45
End: 2025-09-01
Attending: EMERGENCY MEDICINE

## 2025-09-01 ENCOUNTER — APPOINTMENT (OUTPATIENT)
Dept: CT IMAGING | Facility: CLINIC | Age: 45
End: 2025-09-01
Attending: EMERGENCY MEDICINE

## 2025-09-01 ENCOUNTER — APPOINTMENT (OUTPATIENT)
Dept: CT IMAGING | Facility: CLINIC | Age: 45
End: 2025-09-01

## 2025-09-01 DIAGNOSIS — I26.99 PULMONARY EMBOLISM, UNSPECIFIED CHRONICITY, UNSPECIFIED PULMONARY EMBOLISM TYPE, UNSPECIFIED WHETHER ACUTE COR PULMONALE PRESENT (H): ICD-10-CM

## 2025-09-01 DIAGNOSIS — E78.1 HYPERTRIGLYCERIDEMIA: ICD-10-CM

## 2025-09-01 DIAGNOSIS — R56.9 SEIZURE (H): ICD-10-CM

## 2025-09-01 DIAGNOSIS — Z79.4 TYPE 2 DIABETES MELLITUS WITH HYPERGLYCEMIA, WITH LONG-TERM CURRENT USE OF INSULIN (H): ICD-10-CM

## 2025-09-01 DIAGNOSIS — Z79.4 TYPE 2 DIABETES MELLITUS WITHOUT COMPLICATION, WITH LONG-TERM CURRENT USE OF INSULIN (H): ICD-10-CM

## 2025-09-01 DIAGNOSIS — R73.9 HYPERGLYCEMIA: ICD-10-CM

## 2025-09-01 DIAGNOSIS — D49.6 BRAIN TUMOR (H): ICD-10-CM

## 2025-09-01 DIAGNOSIS — I10 SEVERE HYPERTENSION: ICD-10-CM

## 2025-09-01 DIAGNOSIS — C34.31 MALIGNANT NEOPLASM OF LOWER LOBE OF RIGHT LUNG (H): ICD-10-CM

## 2025-09-01 DIAGNOSIS — Z86.711 PERSONAL HISTORY OF PE (PULMONARY EMBOLISM): ICD-10-CM

## 2025-09-01 DIAGNOSIS — C34.90 MALIGNANT NEOPLASM OF LUNG, UNSPECIFIED LATERALITY, UNSPECIFIED PART OF LUNG (H): ICD-10-CM

## 2025-09-01 DIAGNOSIS — E11.9 TYPE 2 DIABETES MELLITUS WITHOUT COMPLICATION, WITH LONG-TERM CURRENT USE OF INSULIN (H): ICD-10-CM

## 2025-09-01 DIAGNOSIS — Z79.01 LONG TERM (CURRENT) USE OF ANTICOAGULANTS: ICD-10-CM

## 2025-09-01 DIAGNOSIS — C79.31 MALIGNANT NEOPLASM METASTATIC TO BRAIN (H): Primary | ICD-10-CM

## 2025-09-01 DIAGNOSIS — E11.65 TYPE 2 DIABETES MELLITUS WITH HYPERGLYCEMIA, WITH LONG-TERM CURRENT USE OF INSULIN (H): ICD-10-CM

## 2025-09-01 DIAGNOSIS — R56.9 SEIZURES (H): ICD-10-CM

## 2025-09-01 DIAGNOSIS — C79.31 MALIGNANT NEOPLASM METASTATIC TO BRAIN (H): ICD-10-CM

## 2025-09-01 DIAGNOSIS — G40.109 LOCALIZATION-RELATED FOCAL EPILEPSY WITH SIMPLE PARTIAL SEIZURES (H): ICD-10-CM

## 2025-09-01 LAB
ANION GAP SERPL CALCULATED.3IONS-SCNC: 11 MMOL/L (ref 7–15)
APTT PPP: 22 SECONDS (ref 22–38)
ATRIAL RATE - MUSE: 83 BPM
B-OH-BUTYR SERPL-SCNC: 0.2 MMOL/L
BASE EXCESS BLDV CALC-SCNC: -1 MMOL/L (ref -3–3)
BASE EXCESS BLDV CALC-SCNC: 0 MMOL/L (ref -3–3)
BASE EXCESS BLDV CALC-SCNC: 1.1 MMOL/L (ref -3–3)
BASOPHILS # BLD AUTO: <0.03 10E3/UL (ref 0–0.2)
BASOPHILS NFR BLD AUTO: 0.3 %
BUN SERPL-MCNC: 12.8 MG/DL (ref 6–20)
CA-I BLD-MCNC: 4.7 MG/DL (ref 4.4–5.2)
CALCIUM SERPL-MCNC: 9.1 MG/DL (ref 8.8–10.4)
CHLORIDE SERPL-SCNC: 99 MMOL/L (ref 98–107)
CPB POCT: NO
CREAT BLD-MCNC: 0.6 MG/DL (ref 0.7–1.2)
CREAT SERPL-MCNC: 0.49 MG/DL (ref 0.67–1.17)
DIASTOLIC BLOOD PRESSURE - MUSE: NORMAL MMHG
EGFRCR SERPLBLD CKD-EPI 2021: >60 ML/MIN/1.73M2
EGFRCR SERPLBLD CKD-EPI 2021: >90 ML/MIN/1.73M2
EOSINOPHIL # BLD AUTO: 0.14 10E3/UL (ref 0–0.7)
EOSINOPHIL NFR BLD AUTO: 1.9 %
ERYTHROCYTE [DISTWIDTH] IN BLOOD BY AUTOMATED COUNT: 12.7 % (ref 10–15)
GLUCOSE BLD-MCNC: 597 MG/DL (ref 70–99)
GLUCOSE BLDC GLUCOMTR-MCNC: 349 MG/DL (ref 70–99)
GLUCOSE BLDC GLUCOMTR-MCNC: 396 MG/DL (ref 70–99)
GLUCOSE BLDC GLUCOMTR-MCNC: 436 MG/DL (ref 70–99)
GLUCOSE BLDC GLUCOMTR-MCNC: 468 MG/DL (ref 70–99)
GLUCOSE BLDC GLUCOMTR-MCNC: 580 MG/DL (ref 70–99)
GLUCOSE SERPL-MCNC: 601 MG/DL (ref 70–99)
HCO3 BLDV-SCNC: 24 MMOL/L (ref 21–28)
HCO3 BLDV-SCNC: 25 MMOL/L (ref 21–28)
HCO3 BLDV-SCNC: 27 MMOL/L (ref 21–28)
HCO3 SERPL-SCNC: 24 MMOL/L (ref 22–29)
HCT VFR BLD AUTO: 41.3 % (ref 40–53)
HCT VFR BLD CALC: 42 % (ref 40–53)
HGB BLD-MCNC: 14.3 G/DL (ref 13.3–17.7)
HGB BLD-MCNC: 14.5 G/DL (ref 13.3–17.7)
HOLD SPECIMEN: NORMAL
IMM GRANULOCYTES # BLD: 0.03 10E3/UL
IMM GRANULOCYTES NFR BLD: 0.4 %
INR PPP: 0.96 (ref 0.85–1.15)
INTERPRETATION ECG - MUSE: NORMAL
LACTATE BLD-SCNC: 3.9 MMOL/L (ref 0.7–2)
LACTATE SERPL-SCNC: 1.3 MMOL/L (ref 0.7–2)
LACTATE SERPL-SCNC: 1.7 MMOL/L (ref 0.7–2)
LACTATE SERPL-SCNC: 2.2 MMOL/L (ref 0.7–2)
LEVETIRACETAM SERPL-MCNC: 33.8 ÂΜG/ML (ref 10–40)
LYMPHOCYTES # BLD AUTO: 2.3 10E3/UL (ref 0.8–5.3)
LYMPHOCYTES NFR BLD AUTO: 31.6 %
MCH RBC QN AUTO: 30.5 PG (ref 26.5–33)
MCHC RBC AUTO-ENTMCNC: 35.1 G/DL (ref 31.5–36.5)
MCV RBC AUTO: 86.8 FL (ref 78–100)
MONOCYTES # BLD AUTO: 0.47 10E3/UL (ref 0–1.3)
MONOCYTES NFR BLD AUTO: 6.5 %
NEUTROPHILS # BLD AUTO: 4.32 10E3/UL (ref 1.6–8.3)
NEUTROPHILS NFR BLD AUTO: 59.3 %
NRBC # BLD AUTO: <0.03 10E3/UL
NRBC BLD AUTO-RTO: 0 /100
O2/TOTAL GAS SETTING VFR VENT: 0 %
OXYHGB MFR BLDV: 67 % (ref 70–75)
P AXIS - MUSE: 41 DEGREES
PCO2 BLDV: 40 MM HG (ref 40–50)
PCO2 BLDV: 40 MM HG (ref 40–50)
PCO2 BLDV: 47 MM HG (ref 40–50)
PH BLDV: 7.37 [PH] (ref 7.32–7.43)
PH BLDV: 7.39 [PH] (ref 7.32–7.43)
PH BLDV: 7.39 [PH] (ref 7.32–7.43)
PLATELET # BLD AUTO: 154 10E3/UL (ref 150–450)
PO2 BLDV: 37 MM HG (ref 25–47)
PO2 BLDV: 75 MM HG (ref 25–47)
PO2 BLDV: 77 MM HG (ref 25–47)
POTASSIUM BLD-SCNC: 3.9 MMOL/L (ref 3.4–5.3)
POTASSIUM SERPL-SCNC: 3.9 MMOL/L (ref 3.4–5.3)
PR INTERVAL - MUSE: 156 MS
PROTHROMBIN TIME: 12.7 SECONDS (ref 11.8–14.8)
QRS DURATION - MUSE: 90 MS
QT - MUSE: 366 MS
QTC - MUSE: 430 MS
R AXIS - MUSE: 36 DEGREES
RBC # BLD AUTO: 4.76 10E6/UL (ref 4.4–5.9)
SAO2 % BLDV: 68.9 % (ref 70–75)
SAO2 % BLDV: 95 % (ref 70–75)
SAO2 % BLDV: 95 % (ref 70–75)
SODIUM BLD-SCNC: 135 MMOL/L (ref 135–145)
SODIUM SERPL-SCNC: 134 MMOL/L (ref 135–145)
SYSTOLIC BLOOD PRESSURE - MUSE: NORMAL MMHG
T AXIS - MUSE: 37 DEGREES
TROPONIN T SERPL HS-MCNC: 7 NG/L
TROPONIN T SERPL HS-MCNC: 8 NG/L
VENTRICULAR RATE- MUSE: 83 BPM
WBC # BLD AUTO: 7.28 10E3/UL (ref 4–11)

## 2025-09-01 PROCEDURE — 84484 ASSAY OF TROPONIN QUANT: CPT | Performed by: EMERGENCY MEDICINE

## 2025-09-01 PROCEDURE — 70498 CT ANGIOGRAPHY NECK: CPT

## 2025-09-01 PROCEDURE — 96361 HYDRATE IV INFUSION ADD-ON: CPT | Performed by: EMERGENCY MEDICINE

## 2025-09-01 PROCEDURE — 250N000009 HC RX 250: Performed by: PHYSICIAN ASSISTANT

## 2025-09-01 PROCEDURE — 255N000002 HC RX 255 OP 636: Performed by: EMERGENCY MEDICINE

## 2025-09-01 PROCEDURE — 250N000013 HC RX MED GY IP 250 OP 250 PS 637: Performed by: INTERNAL MEDICINE

## 2025-09-01 PROCEDURE — 93005 ELECTROCARDIOGRAM TRACING: CPT | Performed by: EMERGENCY MEDICINE

## 2025-09-01 PROCEDURE — 82010 KETONE BODYS QUAN: CPT | Performed by: EMERGENCY MEDICINE

## 2025-09-01 PROCEDURE — 250N000013 HC RX MED GY IP 250 OP 250 PS 637: Performed by: PHYSICIAN ASSISTANT

## 2025-09-01 PROCEDURE — 80177 DRUG SCRN QUAN LEVETIRACETAM: CPT | Performed by: STUDENT IN AN ORGANIZED HEALTH CARE EDUCATION/TRAINING PROGRAM

## 2025-09-01 PROCEDURE — 99223 1ST HOSP IP/OBS HIGH 75: CPT | Performed by: INTERNAL MEDICINE

## 2025-09-01 PROCEDURE — 83605 ASSAY OF LACTIC ACID: CPT

## 2025-09-01 PROCEDURE — 36415 COLL VENOUS BLD VENIPUNCTURE: CPT

## 2025-09-01 PROCEDURE — 99207 PR NO BILLABLE SERVICE THIS VISIT: CPT | Performed by: STUDENT IN AN ORGANIZED HEALTH CARE EDUCATION/TRAINING PROGRAM

## 2025-09-01 PROCEDURE — 36415 COLL VENOUS BLD VENIPUNCTURE: CPT | Performed by: EMERGENCY MEDICINE

## 2025-09-01 PROCEDURE — 83605 ASSAY OF LACTIC ACID: CPT | Performed by: EMERGENCY MEDICINE

## 2025-09-01 PROCEDURE — 70553 MRI BRAIN STEM W/O & W/DYE: CPT

## 2025-09-01 PROCEDURE — 70498 CT ANGIOGRAPHY NECK: CPT | Mod: 26 | Performed by: RADIOLOGY

## 2025-09-01 PROCEDURE — 96374 THER/PROPH/DIAG INJ IV PUSH: CPT | Mod: 59 | Performed by: EMERGENCY MEDICINE

## 2025-09-01 PROCEDURE — 250N000009 HC RX 250: Performed by: EMERGENCY MEDICINE

## 2025-09-01 PROCEDURE — 95711 VEEG 2-12 HR UNMONITORED: CPT

## 2025-09-01 PROCEDURE — 82805 BLOOD GASES W/O2 SATURATION: CPT | Performed by: EMERGENCY MEDICINE

## 2025-09-01 PROCEDURE — 85610 PROTHROMBIN TIME: CPT | Performed by: EMERGENCY MEDICINE

## 2025-09-01 PROCEDURE — 120N000002 HC R&B MED SURG/OB UMMC

## 2025-09-01 PROCEDURE — 85004 AUTOMATED DIFF WBC COUNT: CPT | Performed by: EMERGENCY MEDICINE

## 2025-09-01 PROCEDURE — 250N000011 HC RX IP 250 OP 636: Performed by: EMERGENCY MEDICINE

## 2025-09-01 PROCEDURE — 70450 CT HEAD/BRAIN W/O DYE: CPT

## 2025-09-01 PROCEDURE — 70553 MRI BRAIN STEM W/O & W/DYE: CPT | Mod: 26 | Performed by: RADIOLOGY

## 2025-09-01 PROCEDURE — 82565 ASSAY OF CREATININE: CPT

## 2025-09-01 PROCEDURE — 70496 CT ANGIOGRAPHY HEAD: CPT | Mod: 26 | Performed by: RADIOLOGY

## 2025-09-01 PROCEDURE — 82947 ASSAY GLUCOSE BLOOD QUANT: CPT | Performed by: EMERGENCY MEDICINE

## 2025-09-01 PROCEDURE — 0042T CT HEAD PERFUSION W CONTRAST: CPT

## 2025-09-01 PROCEDURE — A9585 GADOBUTROL INJECTION: HCPCS | Performed by: EMERGENCY MEDICINE

## 2025-09-01 PROCEDURE — 82962 GLUCOSE BLOOD TEST: CPT

## 2025-09-01 PROCEDURE — 99285 EMERGENCY DEPT VISIT HI MDM: CPT | Mod: 25 | Performed by: EMERGENCY MEDICINE

## 2025-09-01 PROCEDURE — 255N000002 HC RX 255 OP 636: Performed by: STUDENT IN AN ORGANIZED HEALTH CARE EDUCATION/TRAINING PROGRAM

## 2025-09-01 PROCEDURE — 0042T CT HEAD PERFUSION W CONTRAST: CPT | Performed by: RADIOLOGY

## 2025-09-01 PROCEDURE — 258N000003 HC RX IP 258 OP 636: Performed by: PHYSICIAN ASSISTANT

## 2025-09-01 PROCEDURE — 80048 BASIC METABOLIC PNL TOTAL CA: CPT

## 2025-09-01 PROCEDURE — 85730 THROMBOPLASTIN TIME PARTIAL: CPT | Performed by: EMERGENCY MEDICINE

## 2025-09-01 PROCEDURE — 258N000003 HC RX IP 258 OP 636: Performed by: EMERGENCY MEDICINE

## 2025-09-01 RX ORDER — LEVETIRACETAM 750 MG/1
1500 TABLET ORAL 2 TIMES DAILY
Status: DISCONTINUED | OUTPATIENT
Start: 2025-09-01 | End: 2025-09-02

## 2025-09-01 RX ORDER — METHADONE HYDROCHLORIDE 5 MG/1
5 TABLET ORAL EVERY EVENING
Status: DISCONTINUED | OUTPATIENT
Start: 2025-09-01 | End: 2025-09-01

## 2025-09-01 RX ORDER — PROPRANOLOL HYDROCHLORIDE 10 MG/1
20 TABLET ORAL 2 TIMES DAILY
Status: DISCONTINUED | OUTPATIENT
Start: 2025-09-01 | End: 2025-09-02

## 2025-09-01 RX ORDER — DEXTROSE MONOHYDRATE 100 MG/ML
INJECTION, SOLUTION INTRAVENOUS CONTINUOUS PRN
Status: DISCONTINUED | OUTPATIENT
Start: 2025-09-01 | End: 2025-09-03 | Stop reason: HOSPADM

## 2025-09-01 RX ORDER — METHYLPHENIDATE HYDROCHLORIDE 5 MG/1
5-10 TABLET ORAL 2 TIMES DAILY PRN
Status: DISCONTINUED | OUTPATIENT
Start: 2025-09-01 | End: 2025-09-03 | Stop reason: HOSPADM

## 2025-09-01 RX ORDER — PROCHLORPERAZINE MALEATE 10 MG
10 TABLET ORAL EVERY 6 HOURS PRN
Status: DISCONTINUED | OUTPATIENT
Start: 2025-09-01 | End: 2025-09-03 | Stop reason: HOSPADM

## 2025-09-01 RX ORDER — CYCLOBENZAPRINE HCL 10 MG
10 TABLET ORAL
Status: DISCONTINUED | OUTPATIENT
Start: 2025-09-01 | End: 2025-09-03 | Stop reason: HOSPADM

## 2025-09-01 RX ORDER — LABETALOL HYDROCHLORIDE 5 MG/ML
10 INJECTION, SOLUTION INTRAVENOUS ONCE
Status: COMPLETED | OUTPATIENT
Start: 2025-09-01 | End: 2025-09-01

## 2025-09-01 RX ORDER — OXYCODONE HYDROCHLORIDE 10 MG/1
10 TABLET ORAL
Status: DISCONTINUED | OUTPATIENT
Start: 2025-09-01 | End: 2025-09-03 | Stop reason: HOSPADM

## 2025-09-01 RX ORDER — AMLODIPINE BESYLATE 10 MG/1
10 TABLET ORAL DAILY
Status: DISCONTINUED | OUTPATIENT
Start: 2025-09-02 | End: 2025-09-03 | Stop reason: HOSPADM

## 2025-09-01 RX ORDER — GADOBUTROL 604.72 MG/ML
9.5 INJECTION INTRAVENOUS ONCE
Status: COMPLETED | OUTPATIENT
Start: 2025-09-01 | End: 2025-09-01

## 2025-09-01 RX ORDER — DULOXETIN HYDROCHLORIDE 30 MG/1
30 CAPSULE, DELAYED RELEASE ORAL 2 TIMES DAILY
Status: DISCONTINUED | OUTPATIENT
Start: 2025-09-01 | End: 2025-09-02

## 2025-09-01 RX ORDER — DEXTROSE MONOHYDRATE 25 G/50ML
25-50 INJECTION, SOLUTION INTRAVENOUS
Status: DISCONTINUED | OUTPATIENT
Start: 2025-09-01 | End: 2025-09-03 | Stop reason: HOSPADM

## 2025-09-01 RX ORDER — ROSUVASTATIN CALCIUM 10 MG/1
10 TABLET, COATED ORAL DAILY
Status: DISCONTINUED | OUTPATIENT
Start: 2025-09-02 | End: 2025-09-03 | Stop reason: HOSPADM

## 2025-09-01 RX ORDER — NICOTINE POLACRILEX 4 MG
15-30 LOZENGE BUCCAL
Status: DISCONTINUED | OUTPATIENT
Start: 2025-09-01 | End: 2025-09-03 | Stop reason: HOSPADM

## 2025-09-01 RX ORDER — SODIUM CHLORIDE 9 MG/ML
INJECTION, SOLUTION INTRAVENOUS CONTINUOUS
Status: DISCONTINUED | OUTPATIENT
Start: 2025-09-01 | End: 2025-09-03 | Stop reason: HOSPADM

## 2025-09-01 RX ORDER — LISINOPRIL 20 MG/1
40 TABLET ORAL DAILY
Status: DISCONTINUED | OUTPATIENT
Start: 2025-09-02 | End: 2025-09-02

## 2025-09-01 RX ORDER — METHADONE HYDROCHLORIDE 5 MG/1
5 TABLET ORAL EVERY EVENING
Refills: 0 | Status: DISCONTINUED | OUTPATIENT
Start: 2025-09-02 | End: 2025-09-03 | Stop reason: HOSPADM

## 2025-09-01 RX ADMIN — LEVETIRACETAM 1500 MG: 750 TABLET, FILM COATED ORAL at 21:12

## 2025-09-01 RX ADMIN — SODIUM CHLORIDE 100 ML: 9 INJECTION, SOLUTION INTRAVENOUS at 16:03

## 2025-09-01 RX ADMIN — GADOBUTROL 9.5 ML: 604.72 INJECTION INTRAVENOUS at 20:06

## 2025-09-01 RX ADMIN — INSULIN HUMAN 5.5 UNITS/HR: 1 INJECTION, SOLUTION INTRAVENOUS at 21:08

## 2025-09-01 RX ADMIN — LABETALOL HYDROCHLORIDE 10 MG: 5 INJECTION, SOLUTION INTRAVENOUS at 17:30

## 2025-09-01 RX ADMIN — SODIUM CHLORIDE: 0.9 INJECTION, SOLUTION INTRAVENOUS at 21:10

## 2025-09-01 RX ADMIN — DULOXETINE 30 MG: 30 CAPSULE, DELAYED RELEASE ORAL at 23:06

## 2025-09-01 RX ADMIN — METHADONE HYDROCHLORIDE 5 MG: 5 TABLET ORAL at 23:06

## 2025-09-01 RX ADMIN — IOHEXOL 71 ML: 350 INJECTION, SOLUTION INTRAVENOUS at 16:02

## 2025-09-01 RX ADMIN — SODIUM CHLORIDE 100 ML: 9 INJECTION, SOLUTION INTRAVENOUS at 16:25

## 2025-09-01 RX ADMIN — IOHEXOL 70 ML: 350 INJECTION, SOLUTION INTRAVENOUS at 16:25

## 2025-09-01 ASSESSMENT — ACTIVITIES OF DAILY LIVING (ADL)
ADLS_ACUITY_SCORE: 57

## 2025-09-02 ENCOUNTER — APPOINTMENT (OUTPATIENT)
Dept: NEUROLOGY | Facility: CLINIC | Age: 45
End: 2025-09-02

## 2025-09-02 LAB
ALBUMIN SERPL BCG-MCNC: 3.8 G/DL (ref 3.5–5.2)
ALP SERPL-CCNC: 123 U/L (ref 40–150)
ALT SERPL W P-5'-P-CCNC: 18 U/L (ref 0–70)
ANION GAP SERPL CALCULATED.3IONS-SCNC: 11 MMOL/L (ref 7–15)
AST SERPL W P-5'-P-CCNC: 19 U/L (ref 0–45)
BILIRUB SERPL-MCNC: 0.5 MG/DL
BUN SERPL-MCNC: 13.1 MG/DL (ref 6–20)
CALCIUM SERPL-MCNC: 9 MG/DL (ref 8.8–10.4)
CHLORIDE SERPL-SCNC: 104 MMOL/L (ref 98–107)
CREAT SERPL-MCNC: 0.54 MG/DL (ref 0.67–1.17)
EGFRCR SERPLBLD CKD-EPI 2021: >90 ML/MIN/1.73M2
ERYTHROCYTE [DISTWIDTH] IN BLOOD BY AUTOMATED COUNT: 12.7 % (ref 10–15)
GLUCOSE BLDC GLUCOMTR-MCNC: 136 MG/DL (ref 70–99)
GLUCOSE BLDC GLUCOMTR-MCNC: 159 MG/DL (ref 70–99)
GLUCOSE BLDC GLUCOMTR-MCNC: 213 MG/DL (ref 70–99)
GLUCOSE BLDC GLUCOMTR-MCNC: 215 MG/DL (ref 70–99)
GLUCOSE BLDC GLUCOMTR-MCNC: 257 MG/DL (ref 70–99)
GLUCOSE BLDC GLUCOMTR-MCNC: 274 MG/DL (ref 70–99)
GLUCOSE BLDC GLUCOMTR-MCNC: 278 MG/DL (ref 70–99)
GLUCOSE BLDC GLUCOMTR-MCNC: 280 MG/DL (ref 70–99)
GLUCOSE BLDC GLUCOMTR-MCNC: 294 MG/DL (ref 70–99)
GLUCOSE BLDC GLUCOMTR-MCNC: 297 MG/DL (ref 70–99)
GLUCOSE BLDC GLUCOMTR-MCNC: 302 MG/DL (ref 70–99)
GLUCOSE BLDC GLUCOMTR-MCNC: 308 MG/DL (ref 70–99)
GLUCOSE BLDC GLUCOMTR-MCNC: 311 MG/DL (ref 70–99)
GLUCOSE BLDC GLUCOMTR-MCNC: 330 MG/DL (ref 70–99)
GLUCOSE BLDC GLUCOMTR-MCNC: 352 MG/DL (ref 70–99)
GLUCOSE SERPL-MCNC: 349 MG/DL (ref 70–99)
HCO3 SERPL-SCNC: 23 MMOL/L (ref 22–29)
HCT VFR BLD AUTO: 45.5 % (ref 40–53)
HGB BLD-MCNC: 15.7 G/DL (ref 13.3–17.7)
MCH RBC QN AUTO: 30.5 PG (ref 26.5–33)
MCHC RBC AUTO-ENTMCNC: 34.5 G/DL (ref 31.5–36.5)
MCV RBC AUTO: 88.5 FL (ref 78–100)
PLATELET # BLD AUTO: 169 10E3/UL (ref 150–450)
POTASSIUM SERPL-SCNC: 3.1 MMOL/L (ref 3.4–5.3)
PROT SERPL-MCNC: 6.4 G/DL (ref 6.4–8.3)
RBC # BLD AUTO: 5.14 10E6/UL (ref 4.4–5.9)
SODIUM SERPL-SCNC: 138 MMOL/L (ref 135–145)
WBC # BLD AUTO: 9.47 10E3/UL (ref 4–11)

## 2025-09-02 PROCEDURE — 120N000002 HC R&B MED SURG/OB UMMC

## 2025-09-02 PROCEDURE — 250N000012 HC RX MED GY IP 250 OP 636 PS 637: Performed by: INTERNAL MEDICINE

## 2025-09-02 PROCEDURE — 999N000248 HC STATISTIC IV INSERT WITH US BY RN

## 2025-09-02 PROCEDURE — 84155 ASSAY OF PROTEIN SERUM: CPT | Performed by: INTERNAL MEDICINE

## 2025-09-02 PROCEDURE — 250N000013 HC RX MED GY IP 250 OP 250 PS 637: Performed by: PHYSICIAN ASSISTANT

## 2025-09-02 PROCEDURE — 95714 VEEG EA 12-26 HR UNMNTR: CPT

## 2025-09-02 PROCEDURE — 250N000013 HC RX MED GY IP 250 OP 250 PS 637: Performed by: INTERNAL MEDICINE

## 2025-09-02 PROCEDURE — 250N000013 HC RX MED GY IP 250 OP 250 PS 637

## 2025-09-02 PROCEDURE — 36415 COLL VENOUS BLD VENIPUNCTURE: CPT | Performed by: INTERNAL MEDICINE

## 2025-09-02 PROCEDURE — 250N000009 HC RX 250: Performed by: PHYSICIAN ASSISTANT

## 2025-09-02 PROCEDURE — 85018 HEMOGLOBIN: CPT | Performed by: INTERNAL MEDICINE

## 2025-09-02 RX ORDER — ZONISAMIDE 100 MG/1
100 CAPSULE ORAL AT BEDTIME
Status: DISCONTINUED | OUTPATIENT
Start: 2025-09-02 | End: 2025-09-02

## 2025-09-02 RX ORDER — NALOXONE HYDROCHLORIDE 0.4 MG/ML
0.4 INJECTION, SOLUTION INTRAMUSCULAR; INTRAVENOUS; SUBCUTANEOUS
Status: DISCONTINUED | OUTPATIENT
Start: 2025-09-02 | End: 2025-09-03 | Stop reason: HOSPADM

## 2025-09-02 RX ORDER — ZONISAMIDE 100 MG/1
100 CAPSULE ORAL AT BEDTIME
Status: DISCONTINUED | OUTPATIENT
Start: 2025-09-02 | End: 2025-09-03 | Stop reason: HOSPADM

## 2025-09-02 RX ORDER — ZONISAMIDE 100 MG/1
300 CAPSULE ORAL AT BEDTIME
Status: DISCONTINUED | OUTPATIENT
Start: 2025-09-16 | End: 2025-09-03 | Stop reason: HOSPADM

## 2025-09-02 RX ORDER — ZONISAMIDE 100 MG/1
400 CAPSULE ORAL AT BEDTIME
Status: DISCONTINUED | OUTPATIENT
Start: 2025-09-23 | End: 2025-09-03 | Stop reason: HOSPADM

## 2025-09-02 RX ORDER — NALOXONE HYDROCHLORIDE 0.4 MG/ML
0.2 INJECTION, SOLUTION INTRAMUSCULAR; INTRAVENOUS; SUBCUTANEOUS
Status: DISCONTINUED | OUTPATIENT
Start: 2025-09-02 | End: 2025-09-03 | Stop reason: HOSPADM

## 2025-09-02 RX ORDER — ZONISAMIDE 100 MG/1
200 CAPSULE ORAL AT BEDTIME
Status: DISCONTINUED | OUTPATIENT
Start: 2025-09-09 | End: 2025-09-03 | Stop reason: HOSPADM

## 2025-09-02 RX ADMIN — RIVAROXABAN 20 MG: 20 TABLET, FILM COATED ORAL at 00:28

## 2025-09-02 RX ADMIN — METHADONE HYDROCHLORIDE 5 MG: 5 TABLET ORAL at 01:21

## 2025-09-02 RX ADMIN — AMLODIPINE BESYLATE 10 MG: 10 TABLET ORAL at 09:14

## 2025-09-02 RX ADMIN — RIVAROXABAN 20 MG: 20 TABLET, FILM COATED ORAL at 18:52

## 2025-09-02 RX ADMIN — ZONISAMIDE 100 MG: 100 CAPSULE ORAL at 21:40

## 2025-09-02 RX ADMIN — METHADONE HYDROCHLORIDE 5 MG: 5 TABLET ORAL at 19:43

## 2025-09-02 RX ADMIN — INSULIN GLARGINE 48 UNITS: 100 INJECTION, SOLUTION SUBCUTANEOUS at 09:16

## 2025-09-02 RX ADMIN — INSULIN GLARGINE 48 UNITS: 100 INJECTION, SOLUTION SUBCUTANEOUS at 19:44

## 2025-09-02 RX ADMIN — LISINOPRIL 40 MG: 20 TABLET ORAL at 09:14

## 2025-09-02 RX ADMIN — LEVETIRACETAM 1500 MG: 750 TABLET, FILM COATED ORAL at 09:14

## 2025-09-02 RX ADMIN — INSULIN ASPART 11 UNITS: 100 INJECTION, SOLUTION INTRAVENOUS; SUBCUTANEOUS at 19:39

## 2025-09-02 RX ADMIN — PROPRANOLOL HYDROCHLORIDE 20 MG: 10 TABLET ORAL at 09:14

## 2025-09-02 RX ADMIN — INSULIN ASPART 4 UNITS: 100 INJECTION, SOLUTION INTRAVENOUS; SUBCUTANEOUS at 15:00

## 2025-09-02 RX ADMIN — PROPRANOLOL HYDROCHLORIDE 20 MG: 10 TABLET ORAL at 00:28

## 2025-09-02 RX ADMIN — INSULIN ASPART 9 UNITS: 100 INJECTION, SOLUTION INTRAVENOUS; SUBCUTANEOUS at 15:31

## 2025-09-02 RX ADMIN — ROSUVASTATIN CALCIUM 10 MG: 10 TABLET, FILM COATED ORAL at 09:14

## 2025-09-02 RX ADMIN — INSULIN ASPART 4 UNITS: 100 INJECTION, SOLUTION INTRAVENOUS; SUBCUTANEOUS at 18:58

## 2025-09-02 RX ADMIN — LEVETIRACETAM 2000 MG: 750 TABLET, FILM COATED ORAL at 19:43

## 2025-09-02 RX ADMIN — DULOXETINE 30 MG: 30 CAPSULE, DELAYED RELEASE ORAL at 09:14

## 2025-09-02 RX ADMIN — METHADONE HYDROCHLORIDE 2.5 MG: 5 TABLET ORAL at 09:14

## 2025-09-02 ASSESSMENT — ACTIVITIES OF DAILY LIVING (ADL)
ADLS_ACUITY_SCORE: 33
ADLS_ACUITY_SCORE: 34
ADLS_ACUITY_SCORE: 33
ADLS_ACUITY_SCORE: 34
ADLS_ACUITY_SCORE: 33
ADLS_ACUITY_SCORE: 34
ADLS_ACUITY_SCORE: 33
ADLS_ACUITY_SCORE: 34
ADLS_ACUITY_SCORE: 33
ADLS_ACUITY_SCORE: 33

## 2025-09-03 VITALS
WEIGHT: 210.3 LBS | BODY MASS INDEX: 31.06 KG/M2 | HEART RATE: 95 BPM | RESPIRATION RATE: 17 BRPM | DIASTOLIC BLOOD PRESSURE: 85 MMHG | OXYGEN SATURATION: 97 % | TEMPERATURE: 98 F | SYSTOLIC BLOOD PRESSURE: 121 MMHG

## 2025-09-03 DIAGNOSIS — D49.6 BRAIN TUMOR (H): ICD-10-CM

## 2025-09-03 DIAGNOSIS — G89.3 CANCER ASSOCIATED PAIN: ICD-10-CM

## 2025-09-03 DIAGNOSIS — Z98.890 S/P CRANIOTOMY: ICD-10-CM

## 2025-09-03 LAB
ALBUMIN SERPL BCG-MCNC: 3.8 G/DL (ref 3.5–5.2)
ALP SERPL-CCNC: 100 U/L (ref 40–150)
ALT SERPL W P-5'-P-CCNC: 16 U/L (ref 0–70)
ANION GAP SERPL CALCULATED.3IONS-SCNC: 9 MMOL/L (ref 7–15)
AST SERPL W P-5'-P-CCNC: 17 U/L (ref 0–45)
BILIRUB SERPL-MCNC: 0.3 MG/DL
BUN SERPL-MCNC: 18.9 MG/DL (ref 6–20)
CALCIUM SERPL-MCNC: 9.3 MG/DL (ref 8.8–10.4)
CHLORIDE SERPL-SCNC: 106 MMOL/L (ref 98–107)
CREAT SERPL-MCNC: 0.57 MG/DL (ref 0.67–1.17)
EGFRCR SERPLBLD CKD-EPI 2021: >90 ML/MIN/1.73M2
ERYTHROCYTE [DISTWIDTH] IN BLOOD BY AUTOMATED COUNT: 12.6 % (ref 10–15)
GLUCOSE BLDC GLUCOMTR-MCNC: 173 MG/DL (ref 70–99)
GLUCOSE BLDC GLUCOMTR-MCNC: 194 MG/DL (ref 70–99)
GLUCOSE SERPL-MCNC: 146 MG/DL (ref 70–99)
HCO3 SERPL-SCNC: 27 MMOL/L (ref 22–29)
HCT VFR BLD AUTO: 46.6 % (ref 40–53)
HGB BLD-MCNC: 15.9 G/DL (ref 13.3–17.7)
MAGNESIUM SERPL-MCNC: 1.9 MG/DL (ref 1.7–2.3)
MCH RBC QN AUTO: 30.4 PG (ref 26.5–33)
MCHC RBC AUTO-ENTMCNC: 34.1 G/DL (ref 31.5–36.5)
MCV RBC AUTO: 89.1 FL (ref 78–100)
PHOSPHATE SERPL-MCNC: 3.9 MG/DL (ref 2.5–4.5)
PLATELET # BLD AUTO: 176 10E3/UL (ref 150–450)
POTASSIUM SERPL-SCNC: 3.5 MMOL/L (ref 3.4–5.3)
PROT SERPL-MCNC: 5.8 G/DL (ref 6.4–8.3)
RBC # BLD AUTO: 5.23 10E6/UL (ref 4.4–5.9)
SODIUM SERPL-SCNC: 142 MMOL/L (ref 135–145)
WBC # BLD AUTO: 10.1 10E3/UL (ref 4–11)

## 2025-09-03 PROCEDURE — 85027 COMPLETE CBC AUTOMATED: CPT | Performed by: INTERNAL MEDICINE

## 2025-09-03 PROCEDURE — 250N000013 HC RX MED GY IP 250 OP 250 PS 637: Performed by: INTERNAL MEDICINE

## 2025-09-03 PROCEDURE — 250N000013 HC RX MED GY IP 250 OP 250 PS 637

## 2025-09-03 PROCEDURE — 84100 ASSAY OF PHOSPHORUS: CPT | Performed by: INTERNAL MEDICINE

## 2025-09-03 PROCEDURE — 82040 ASSAY OF SERUM ALBUMIN: CPT | Performed by: INTERNAL MEDICINE

## 2025-09-03 PROCEDURE — 83735 ASSAY OF MAGNESIUM: CPT | Performed by: INTERNAL MEDICINE

## 2025-09-03 PROCEDURE — 36415 COLL VENOUS BLD VENIPUNCTURE: CPT | Performed by: INTERNAL MEDICINE

## 2025-09-03 RX ORDER — ZONISAMIDE 100 MG/1
CAPSULE ORAL
Qty: 161 CAPSULE | Refills: 0 | Status: ACTIVE | OUTPATIENT
Start: 2025-09-03 | End: 2025-10-23

## 2025-09-03 RX ORDER — OXYCODONE HYDROCHLORIDE 10 MG/1
10 TABLET ORAL
Qty: 120 TABLET | Refills: 0 | Status: SHIPPED | OUTPATIENT
Start: 2025-09-03

## 2025-09-03 RX ORDER — ROSUVASTATIN CALCIUM 10 MG/1
10 TABLET, COATED ORAL DAILY
Qty: 90 TABLET | Refills: 0 | Status: SHIPPED | OUTPATIENT
Start: 2025-09-03

## 2025-09-03 RX ORDER — LEVETIRACETAM 1000 MG/1
2000 TABLET ORAL 2 TIMES DAILY
Qty: 120 TABLET | Refills: 1 | Status: ACTIVE | OUTPATIENT
Start: 2025-09-03

## 2025-09-03 RX ORDER — AMLODIPINE BESYLATE 10 MG/1
10 TABLET ORAL DAILY
Qty: 90 TABLET | Refills: 3 | Status: SHIPPED | OUTPATIENT
Start: 2025-09-03

## 2025-09-03 RX ADMIN — INSULIN ASPART 12 UNITS: 100 INJECTION, SOLUTION INTRAVENOUS; SUBCUTANEOUS at 08:08

## 2025-09-03 RX ADMIN — ROSUVASTATIN CALCIUM 10 MG: 10 TABLET, FILM COATED ORAL at 08:38

## 2025-09-03 RX ADMIN — AMLODIPINE BESYLATE 10 MG: 10 TABLET ORAL at 08:39

## 2025-09-03 RX ADMIN — METHADONE HYDROCHLORIDE 2.5 MG: 5 TABLET ORAL at 08:38

## 2025-09-03 RX ADMIN — LEVETIRACETAM 2000 MG: 750 TABLET, FILM COATED ORAL at 08:38

## 2025-09-03 RX ADMIN — INSULIN GLARGINE 48 UNITS: 100 INJECTION, SOLUTION SUBCUTANEOUS at 08:07

## 2025-09-03 RX ADMIN — INSULIN ASPART 2 UNITS: 100 INJECTION, SOLUTION INTRAVENOUS; SUBCUTANEOUS at 08:08

## 2025-09-03 ASSESSMENT — ACTIVITIES OF DAILY LIVING (ADL)
ADLS_ACUITY_SCORE: 33

## 2025-09-04 ENCOUNTER — PATIENT OUTREACH (OUTPATIENT)
Dept: CARE COORDINATION | Facility: CLINIC | Age: 45
End: 2025-09-04

## (undated) DEVICE — TOOL DISSECT MIDAS MR8 F2/7CM TAPER 2.3MM DI MR8-F2/7TA23

## (undated) DEVICE — GOWN XXL 9575

## (undated) DEVICE — DRAPE MICROSCOPE LEICA 54X150" AR8033650

## (undated) DEVICE — TUBING SUCTION 12"X1/4" N612

## (undated) DEVICE — ENDO DISSECTOR BLUNT 05MM  BTD05

## (undated) DEVICE — GLOVE BIOGEL PI MICRO SZ 7.5 48575

## (undated) DEVICE — RX SURGIFLO HEMOSTATIC MATRIX W/THROMBIN 8ML 2994

## (undated) DEVICE — MANIFOLD NEPTUNE 4 PORT 700-20

## (undated) DEVICE — SPONGE COTTONOID 1/2X1/2" 80-1400

## (undated) DEVICE — NDL BLUNT 17GA 1.5" 8881202330

## (undated) DEVICE — LINEN TOWEL PACK X6 WHITE 5487

## (undated) DEVICE — TIP ASPIRATOR SONOPET IQ LARGE 5500-25S-308

## (undated) DEVICE — SU VICRYL 2-0 CT-2 CR 8X18" J726D

## (undated) DEVICE — BATTERY PACK FOR POWERED DRIVER 05.000.007.01S

## (undated) DEVICE — ESU GROUND PAD ADULT W/CORD E7507

## (undated) DEVICE — PACK UNIVERSAL SPLIT 29131

## (undated) DEVICE — ENDO VALVE SUCTION BRONCH EVIS MAJ-209

## (undated) DEVICE — DRAPE LAP W/ARMBOARD 29410

## (undated) DEVICE — SURGICEL HEMOSTAT 3X4" NUKNIT 1943

## (undated) DEVICE — DRAPE U SPLIT 74X120" 29440

## (undated) DEVICE — MARKER SPHERES PASSIVE MEDT PACK 5 8801075

## (undated) DEVICE — IONM UP TO 7 HOURS

## (undated) DEVICE — ESU GROUND PAD UNIVERSAL W/O CORD

## (undated) DEVICE — IOM SUPPLIES

## (undated) DEVICE — DRAPE POUCH IRR 1016

## (undated) DEVICE — PERFORATOR 14MM CODMAN

## (undated) DEVICE — ESU ELEC BLADE 2.75" COATED/INSULATED E1455

## (undated) DEVICE — GLOVE PROTEXIS W/NEU-THERA 7.5  2D73TE75

## (undated) DEVICE — SU SILK 0 SH 30" K834H

## (undated) DEVICE — SPONGE SURGIFOAM 100 1974

## (undated) DEVICE — SU NUROLON 4-0 TF CR 8X18" C584D

## (undated) DEVICE — GLOVE BIOGEL PI MICRO INDICATOR UNDERGLOVE SZ 8.5 48985

## (undated) DEVICE — DRSG STERI STRIP 1/2X4" R1547

## (undated) DEVICE — RETR ELASTIC STAYS LONE STAR BLUNT SGL PK 3350-1G

## (undated) DEVICE — SU VICRYL 3-0 SH 27" UND J416H

## (undated) DEVICE — SYR 30ML SLIP TIP W/O NDL 302833

## (undated) DEVICE — LABEL MEDICATION SYSTEM 3303-P

## (undated) DEVICE — PACK NEURO SNE15SNFSA

## (undated) DEVICE — SOL NACL 0.9% IRRIG 1000ML BOTTLE 2F7124

## (undated) DEVICE — SOL WATER IRRIG 1000ML BOTTLE 2F7114

## (undated) DEVICE — ENDO VALVE BX EVIS MAJ-210

## (undated) DEVICE — DRAPE POUCH INSTRUMENT 1018

## (undated) DEVICE — TUBING SET IRR MALIS BIPOLAR CORD INTEGRATE 6790-100-003

## (undated) DEVICE — BLADE CLIPPER SGL USE 9680

## (undated) DEVICE — ENDO VALVE SYR NDL KIT ULTRASOUND BRONCH NA-201SX-4022-A

## (undated) DEVICE — PIN SKULL MAYFIELD ADULT TITANIUM 3/PK A1120

## (undated) DEVICE — DRSG TELFA 3X8" 1238

## (undated) DEVICE — PITCHER STERILE 1000ML  SSK9004A

## (undated) DEVICE — KIT ENDO FIRST STEP DISINFECTANT 200ML W/POUCH EP-4

## (undated) DEVICE — SU MONOCRYL 4-0 PS-2 27" UND Y426H

## (undated) DEVICE — LINEN TOWEL PACK X5 5464

## (undated) DEVICE — SOL ADH LIQUID BENZOIN SWAB 0.6ML C1544

## (undated) DEVICE — PREP SKIN SCRUB TRAY 4461A

## (undated) DEVICE — SU ETHILON 3-0 FS-1 18" 669H

## (undated) DEVICE — GLOVE PROTEXIS BLUE W/NEU-THERA 8.5  2D73EB85

## (undated) DEVICE — CASSETTE SONOPET IRRIG SUCTION STRL 5500-573-000

## (undated) DEVICE — DRSG PRIMAPORE 03 1/8X6" 66000318

## (undated) DEVICE — DECANTER BAG 2002S

## (undated) DEVICE — TUBING SUCTION 10'X3/16" N510

## (undated) DEVICE — APPLICATOR COTTON TIP 6"X2 STERILE LF 6012

## (undated) DEVICE — Device

## (undated) DEVICE — PREP CHLORAPREP 26ML TINTED ORANGE  260815

## (undated) DEVICE — ENDO VALVE SUCTION ULTRASOUND BRONCH MAJ-1414

## (undated) RX ORDER — DEXAMETHASONE SODIUM PHOSPHATE 4 MG/ML
INJECTION, SOLUTION INTRA-ARTICULAR; INTRALESIONAL; INTRAMUSCULAR; INTRAVENOUS; SOFT TISSUE
Status: DISPENSED
Start: 2022-01-26

## (undated) RX ORDER — LIDOCAINE 40 MG/G
CREAM TOPICAL
Status: DISPENSED
Start: 2022-02-15

## (undated) RX ORDER — OXYCODONE HYDROCHLORIDE 5 MG/1
TABLET ORAL
Status: DISPENSED
Start: 2022-01-26

## (undated) RX ORDER — BUPIVACAINE HYDROCHLORIDE AND EPINEPHRINE 2.5; 5 MG/ML; UG/ML
INJECTION, SOLUTION EPIDURAL; INFILTRATION; INTRACAUDAL; PERINEURAL
Status: DISPENSED
Start: 2023-06-27

## (undated) RX ORDER — LORAZEPAM 0.5 MG/1
TABLET ORAL
Status: DISPENSED
Start: 2022-02-15

## (undated) RX ORDER — ACETAMINOPHEN 325 MG/1
TABLET ORAL
Status: DISPENSED
Start: 2022-01-26

## (undated) RX ORDER — GINSENG 100 MG
CAPSULE ORAL
Status: DISPENSED
Start: 2022-06-28

## (undated) RX ORDER — LIDOCAINE HYDROCHLORIDE 10 MG/ML
INJECTION, SOLUTION EPIDURAL; INFILTRATION; INTRACAUDAL; PERINEURAL
Status: DISPENSED
Start: 2022-01-26

## (undated) RX ORDER — EPHEDRINE SULFATE 50 MG/ML
INJECTION, SOLUTION INTRAMUSCULAR; INTRAVENOUS; SUBCUTANEOUS
Status: DISPENSED
Start: 2023-06-27

## (undated) RX ORDER — LIDOCAINE HYDROCHLORIDE 10 MG/ML
INJECTION, SOLUTION INFILTRATION; PERINEURAL
Status: DISPENSED
Start: 2022-04-02

## (undated) RX ORDER — FENTANYL CITRATE-0.9 % NACL/PF 10 MCG/ML
PLASTIC BAG, INJECTION (ML) INTRAVENOUS
Status: DISPENSED
Start: 2022-01-26

## (undated) RX ORDER — GLYCOPYRROLATE 0.2 MG/ML
INJECTION, SOLUTION INTRAMUSCULAR; INTRAVENOUS
Status: DISPENSED
Start: 2023-06-27

## (undated) RX ORDER — CEFAZOLIN SODIUM 1 G/50ML
SOLUTION INTRAVENOUS
Status: DISPENSED
Start: 2022-01-26

## (undated) RX ORDER — PROPOFOL 10 MG/ML
INJECTION, EMULSION INTRAVENOUS
Status: DISPENSED
Start: 2022-06-28

## (undated) RX ORDER — ESMOLOL HYDROCHLORIDE 10 MG/ML
INJECTION INTRAVENOUS
Status: DISPENSED
Start: 2022-01-26

## (undated) RX ORDER — FENTANYL CITRATE 0.05 MG/ML
INJECTION, SOLUTION INTRAMUSCULAR; INTRAVENOUS
Status: DISPENSED
Start: 2023-06-27

## (undated) RX ORDER — FENTANYL CITRATE 50 UG/ML
INJECTION, SOLUTION INTRAMUSCULAR; INTRAVENOUS
Status: DISPENSED
Start: 2022-04-02

## (undated) RX ORDER — CEFAZOLIN SODIUM/WATER 2 G/20 ML
SYRINGE (ML) INTRAVENOUS
Status: DISPENSED
Start: 2023-06-27

## (undated) RX ORDER — FENTANYL CITRATE 50 UG/ML
INJECTION, SOLUTION INTRAMUSCULAR; INTRAVENOUS
Status: DISPENSED
Start: 2022-06-28

## (undated) RX ORDER — BUPIVACAINE HYDROCHLORIDE AND EPINEPHRINE 5; 5 MG/ML; UG/ML
INJECTION, SOLUTION EPIDURAL; INTRACAUDAL; PERINEURAL
Status: DISPENSED
Start: 2022-06-28

## (undated) RX ORDER — REMIFENTANIL HYDROCHLORIDE 1 MG/ML
INJECTION, POWDER, LYOPHILIZED, FOR SOLUTION INTRAVENOUS
Status: DISPENSED
Start: 2022-06-28

## (undated) RX ORDER — GINSENG 100 MG
CAPSULE ORAL
Status: DISPENSED
Start: 2023-06-27

## (undated) RX ORDER — LIDOCAINE HYDROCHLORIDE 10 MG/ML
INJECTION, SOLUTION EPIDURAL; INFILTRATION; INTRACAUDAL; PERINEURAL
Status: DISPENSED
Start: 2022-02-16

## (undated) RX ORDER — FENTANYL CITRATE 0.05 MG/ML
INJECTION, SOLUTION INTRAMUSCULAR; INTRAVENOUS
Status: DISPENSED
Start: 2022-06-28

## (undated) RX ORDER — FENTANYL CITRATE 50 UG/ML
INJECTION, SOLUTION INTRAMUSCULAR; INTRAVENOUS
Status: DISPENSED
Start: 2023-06-27

## (undated) RX ORDER — HYDROMORPHONE HCL IN WATER/PF 6 MG/30 ML
PATIENT CONTROLLED ANALGESIA SYRINGE INTRAVENOUS
Status: DISPENSED
Start: 2023-06-27

## (undated) RX ORDER — LABETALOL HYDROCHLORIDE 5 MG/ML
INJECTION, SOLUTION INTRAVENOUS
Status: DISPENSED
Start: 2022-06-28

## (undated) RX ORDER — ALBUTEROL SULFATE 90 UG/1
AEROSOL, METERED RESPIRATORY (INHALATION)
Status: DISPENSED
Start: 2022-01-26

## (undated) RX ORDER — PROPOFOL 10 MG/ML
INJECTION, EMULSION INTRAVENOUS
Status: DISPENSED
Start: 2022-01-26

## (undated) RX ORDER — FENTANYL CITRATE 50 UG/ML
INJECTION, SOLUTION INTRAMUSCULAR; INTRAVENOUS
Status: DISPENSED
Start: 2022-01-26

## (undated) RX ORDER — ONDANSETRON 2 MG/ML
INJECTION INTRAMUSCULAR; INTRAVENOUS
Status: DISPENSED
Start: 2022-01-26

## (undated) RX ORDER — IPRATROPIUM BROMIDE AND ALBUTEROL SULFATE 2.5; .5 MG/3ML; MG/3ML
SOLUTION RESPIRATORY (INHALATION)
Status: DISPENSED
Start: 2022-01-26

## (undated) RX ORDER — LIDOCAINE HYDROCHLORIDE 20 MG/ML
INJECTION, SOLUTION EPIDURAL; INFILTRATION; INTRACAUDAL; PERINEURAL
Status: DISPENSED
Start: 2022-01-26